# Patient Record
Sex: FEMALE | Race: WHITE | Employment: OTHER | URBAN - METROPOLITAN AREA
[De-identification: names, ages, dates, MRNs, and addresses within clinical notes are randomized per-mention and may not be internally consistent; named-entity substitution may affect disease eponyms.]

---

## 2017-01-19 ENCOUNTER — TRANSFERRED RECORDS (OUTPATIENT)
Dept: HEALTH INFORMATION MANAGEMENT | Facility: CLINIC | Age: 47
End: 2017-01-19

## 2017-01-19 ENCOUNTER — TELEPHONE (OUTPATIENT)
Dept: PSYCHIATRY | Facility: CLINIC | Age: 47
End: 2017-01-19

## 2017-01-19 DIAGNOSIS — Z79.899 ENCOUNTER FOR LONG-TERM (CURRENT) USE OF MEDICATIONS: ICD-10-CM

## 2017-01-19 DIAGNOSIS — F31.9 BIPOLAR DISORDER, UNSPECIFIED (H): Primary | ICD-10-CM

## 2017-01-19 NOTE — TELEPHONE ENCOUNTER
Per Dr. Rodriguez:  Please order a lithium level to [facility].    Faxed lab orders to [phone]

## 2017-01-20 ENCOUNTER — TELEPHONE (OUTPATIENT)
Dept: PSYCHIATRY | Facility: CLINIC | Age: 47
End: 2017-01-20

## 2017-01-20 DIAGNOSIS — Z79.899 ENCOUNTER FOR LONG-TERM (CURRENT) USE OF MEDICATIONS: ICD-10-CM

## 2017-01-20 DIAGNOSIS — F31.9 BIPOLAR DISORDER, UNSPECIFIED (H): ICD-10-CM

## 2017-01-20 LAB — LITHIUM SERPL-SCNC: 0.8 MMOL/L

## 2017-01-20 NOTE — TELEPHONE ENCOUNTER
Received Lithium Level Results from [facility], drawn on 01/19/2017. Results entered into EMR. Sent to Katheryn PAYAN and Dr. Braun for review. Original fax placed in scanning on 01/20/2017 and a copy was kept in psychiatry until scanning completed/confirmed. Ilda Brink LPN

## 2017-01-24 ENCOUNTER — HOSPITAL ENCOUNTER (INPATIENT)
Facility: CLINIC | Age: 47
LOS: 4 days | Discharge: HOME | DRG: 690 | End: 2017-01-29
Attending: EMERGENCY MEDICINE | Admitting: HOSPITALIST
Payer: COMMERCIAL

## 2017-01-24 ENCOUNTER — TELEPHONE (OUTPATIENT)
Dept: PSYCHIATRY | Facility: CLINIC | Age: 47
End: 2017-01-24

## 2017-01-24 DIAGNOSIS — N30.00 ACUTE CYSTITIS WITHOUT HEMATURIA: ICD-10-CM

## 2017-01-24 DIAGNOSIS — A04.72 C. DIFFICILE COLITIS: Primary | ICD-10-CM

## 2017-01-24 PROBLEM — N39.0 UTI (URINARY TRACT INFECTION): Status: ACTIVE | Noted: 2017-01-24

## 2017-01-24 LAB
ALBUMIN UR-MCNC: 30 MG/DL
ANION GAP SERPL CALCULATED.3IONS-SCNC: 8 MMOL/L (ref 3–14)
APPEARANCE UR: ABNORMAL
BACTERIA SPEC CULT: NORMAL
BASOPHILS # BLD AUTO: 0.1 10E9/L (ref 0–0.2)
BASOPHILS NFR BLD AUTO: 0.6 %
BILIRUB UR QL STRIP: NEGATIVE
BUN SERPL-MCNC: 8 MG/DL (ref 7–30)
CALCIUM SERPL-MCNC: 9.1 MG/DL (ref 8.5–10.1)
CHLORIDE SERPL-SCNC: 105 MMOL/L (ref 94–109)
CO2 SERPL-SCNC: 26 MMOL/L (ref 20–32)
COLOR UR AUTO: ABNORMAL
CREAT SERPL-MCNC: 0.96 MG/DL (ref 0.52–1.04)
DIFFERENTIAL METHOD BLD: ABNORMAL
EOSINOPHIL # BLD AUTO: 0.4 10E9/L (ref 0–0.7)
EOSINOPHIL NFR BLD AUTO: 3.2 %
ERYTHROCYTE [DISTWIDTH] IN BLOOD BY AUTOMATED COUNT: 14.9 % (ref 10–15)
GFR SERPL CREATININE-BSD FRML MDRD: 62 ML/MIN/1.7M2
GLUCOSE SERPL-MCNC: 80 MG/DL (ref 70–99)
GLUCOSE UR STRIP-MCNC: NEGATIVE MG/DL
HCT VFR BLD AUTO: 37 % (ref 35–47)
HGB BLD-MCNC: 11.8 G/DL (ref 11.7–15.7)
HGB UR QL STRIP: ABNORMAL
IMM GRANULOCYTES # BLD: 0 10E9/L (ref 0–0.4)
IMM GRANULOCYTES NFR BLD: 0.3 %
KETONES UR STRIP-MCNC: NEGATIVE MG/DL
LEUKOCYTE ESTERASE UR QL STRIP: ABNORMAL
LYMPHOCYTES # BLD AUTO: 2.3 10E9/L (ref 0.8–5.3)
LYMPHOCYTES NFR BLD AUTO: 20.5 %
MAGNESIUM SERPL-MCNC: 1.8 MG/DL (ref 1.6–2.3)
MCH RBC QN AUTO: 28.4 PG (ref 26.5–33)
MCHC RBC AUTO-ENTMCNC: 31.9 G/DL (ref 31.5–36.5)
MCV RBC AUTO: 89 FL (ref 78–100)
MICRO REPORT STATUS: NORMAL
MONOCYTES # BLD AUTO: 0.9 10E9/L (ref 0–1.3)
MONOCYTES NFR BLD AUTO: 7.8 %
NEUTROPHILS # BLD AUTO: 7.7 10E9/L (ref 1.6–8.3)
NEUTROPHILS NFR BLD AUTO: 67.6 %
NITRATE UR QL: NEGATIVE
NRBC # BLD AUTO: 0 10*3/UL
NRBC BLD AUTO-RTO: 0 /100
PH UR STRIP: 6 PH (ref 5–7)
PLATELET # BLD AUTO: 402 10E9/L (ref 150–450)
POTASSIUM SERPL-SCNC: 3.2 MMOL/L (ref 3.4–5.3)
POTASSIUM SERPL-SCNC: 4.1 MMOL/L (ref 3.4–5.3)
RBC # BLD AUTO: 4.15 10E12/L (ref 3.8–5.2)
RBC #/AREA URNS AUTO: 0 /HPF (ref 0–2)
SODIUM SERPL-SCNC: 139 MMOL/L (ref 133–144)
SP GR UR STRIP: 1.01 (ref 1–1.03)
SPECIMEN SOURCE: NORMAL
SQUAMOUS #/AREA URNS AUTO: 7 /HPF (ref 0–1)
URN SPEC COLLECT METH UR: ABNORMAL
UROBILINOGEN UR STRIP-MCNC: NORMAL MG/DL (ref 0–2)
WBC # BLD AUTO: 11.4 10E9/L (ref 4–11)
WBC #/AREA URNS AUTO: >182 /HPF (ref 0–2)
WBC CLUMPS #/AREA URNS HPF: PRESENT /HPF

## 2017-01-24 PROCEDURE — 25000125 ZZHC RX 250: Performed by: PHYSICIAN ASSISTANT

## 2017-01-24 PROCEDURE — 99285 EMERGENCY DEPT VISIT HI MDM: CPT | Mod: 25

## 2017-01-24 PROCEDURE — 83735 ASSAY OF MAGNESIUM: CPT | Performed by: EMERGENCY MEDICINE

## 2017-01-24 PROCEDURE — 80048 BASIC METABOLIC PNL TOTAL CA: CPT | Performed by: EMERGENCY MEDICINE

## 2017-01-24 PROCEDURE — 84132 ASSAY OF SERUM POTASSIUM: CPT | Performed by: PHYSICIAN ASSISTANT

## 2017-01-24 PROCEDURE — G0378 HOSPITAL OBSERVATION PER HR: HCPCS

## 2017-01-24 PROCEDURE — 87186 SC STD MICRODIL/AGAR DIL: CPT | Performed by: EMERGENCY MEDICINE

## 2017-01-24 PROCEDURE — 85025 COMPLETE CBC W/AUTO DIFF WBC: CPT | Performed by: EMERGENCY MEDICINE

## 2017-01-24 PROCEDURE — 81001 URINALYSIS AUTO W/SCOPE: CPT | Performed by: EMERGENCY MEDICINE

## 2017-01-24 PROCEDURE — 96365 THER/PROPH/DIAG IV INF INIT: CPT

## 2017-01-24 PROCEDURE — 25000132 ZZH RX MED GY IP 250 OP 250 PS 637: Performed by: PHYSICIAN ASSISTANT

## 2017-01-24 PROCEDURE — 25000128 H RX IP 250 OP 636: Performed by: EMERGENCY MEDICINE

## 2017-01-24 PROCEDURE — 87088 URINE BACTERIA CULTURE: CPT | Performed by: EMERGENCY MEDICINE

## 2017-01-24 PROCEDURE — 25000132 ZZH RX MED GY IP 250 OP 250 PS 637: Performed by: EMERGENCY MEDICINE

## 2017-01-24 PROCEDURE — 25000128 H RX IP 250 OP 636

## 2017-01-24 PROCEDURE — 96361 HYDRATE IV INFUSION ADD-ON: CPT

## 2017-01-24 PROCEDURE — 87086 URINE CULTURE/COLONY COUNT: CPT | Performed by: EMERGENCY MEDICINE

## 2017-01-24 PROCEDURE — 99220 ZZC INITIAL OBSERVATION CARE,LEVL III: CPT | Performed by: PHYSICIAN ASSISTANT

## 2017-01-24 PROCEDURE — 25000125 ZZHC RX 250

## 2017-01-24 PROCEDURE — 36415 COLL VENOUS BLD VENIPUNCTURE: CPT | Performed by: PHYSICIAN ASSISTANT

## 2017-01-24 PROCEDURE — 96375 TX/PRO/DX INJ NEW DRUG ADDON: CPT

## 2017-01-24 PROCEDURE — 25000125 ZZHC RX 250: Performed by: EMERGENCY MEDICINE

## 2017-01-24 PROCEDURE — 25000128 H RX IP 250 OP 636: Performed by: PHYSICIAN ASSISTANT

## 2017-01-24 RX ORDER — IBUPROFEN 400 MG/1
400-600 TABLET, FILM COATED ORAL EVERY 6 HOURS PRN
Status: DISCONTINUED | OUTPATIENT
Start: 2017-01-24 | End: 2017-01-28

## 2017-01-24 RX ORDER — DESOGESTREL AND ETHINYL ESTRADIOL 0.15-0.03
1 KIT ORAL DAILY
Status: ON HOLD | COMMUNITY
End: 2018-01-04

## 2017-01-24 RX ORDER — CLONAZEPAM 1 MG/1
2 TABLET ORAL AT BEDTIME
Status: DISCONTINUED | OUTPATIENT
Start: 2017-01-24 | End: 2017-01-29 | Stop reason: HOSPADM

## 2017-01-24 RX ORDER — MORPHINE SULFATE 4 MG/ML
4 INJECTION, SOLUTION INTRAMUSCULAR; INTRAVENOUS ONCE
Status: COMPLETED | OUTPATIENT
Start: 2017-01-24 | End: 2017-01-24

## 2017-01-24 RX ORDER — POTASSIUM CHLORIDE 7.45 MG/ML
10 INJECTION INTRAVENOUS
Status: DISCONTINUED | OUTPATIENT
Start: 2017-01-24 | End: 2017-01-29 | Stop reason: HOSPADM

## 2017-01-24 RX ORDER — ONDANSETRON 2 MG/ML
4 INJECTION INTRAMUSCULAR; INTRAVENOUS EVERY 6 HOURS PRN
Status: DISCONTINUED | OUTPATIENT
Start: 2017-01-24 | End: 2017-01-26

## 2017-01-24 RX ORDER — LEVOTHYROXINE SODIUM 50 UG/1
50 TABLET ORAL DAILY
Status: DISCONTINUED | OUTPATIENT
Start: 2017-01-24 | End: 2017-01-29 | Stop reason: HOSPADM

## 2017-01-24 RX ORDER — MAGNESIUM SULFATE HEPTAHYDRATE 40 MG/ML
4 INJECTION, SOLUTION INTRAVENOUS EVERY 4 HOURS PRN
Status: DISCONTINUED | OUTPATIENT
Start: 2017-01-24 | End: 2017-01-29 | Stop reason: HOSPADM

## 2017-01-24 RX ORDER — LIDOCAINE 40 MG/G
CREAM TOPICAL
Status: DISCONTINUED | OUTPATIENT
Start: 2017-01-24 | End: 2017-01-29 | Stop reason: HOSPADM

## 2017-01-24 RX ORDER — MIRTAZAPINE 7.5 MG/1
7.5 TABLET, FILM COATED ORAL AT BEDTIME
Status: DISCONTINUED | OUTPATIENT
Start: 2017-01-24 | End: 2017-01-29 | Stop reason: HOSPADM

## 2017-01-24 RX ORDER — PHENAZOPYRIDINE HYDROCHLORIDE 100 MG/1
200 TABLET, FILM COATED ORAL ONCE
Status: COMPLETED | OUTPATIENT
Start: 2017-01-24 | End: 2017-01-24

## 2017-01-24 RX ORDER — KETOROLAC TROMETHAMINE 30 MG/ML
30 INJECTION, SOLUTION INTRAMUSCULAR; INTRAVENOUS ONCE
Status: COMPLETED | OUTPATIENT
Start: 2017-01-24 | End: 2017-01-24

## 2017-01-24 RX ORDER — PANTOPRAZOLE SODIUM 40 MG/1
40 TABLET, DELAYED RELEASE ORAL
Status: DISCONTINUED | OUTPATIENT
Start: 2017-01-25 | End: 2017-01-29 | Stop reason: HOSPADM

## 2017-01-24 RX ORDER — POTASSIUM CHLORIDE 1500 MG/1
20-40 TABLET, EXTENDED RELEASE ORAL
Status: DISCONTINUED | OUTPATIENT
Start: 2017-01-24 | End: 2017-01-29 | Stop reason: HOSPADM

## 2017-01-24 RX ORDER — LISINOPRIL 10 MG/1
10 TABLET ORAL DAILY
Status: DISCONTINUED | OUTPATIENT
Start: 2017-01-24 | End: 2017-01-29 | Stop reason: HOSPADM

## 2017-01-24 RX ORDER — AMITRIPTYLINE HYDROCHLORIDE 10 MG/1
10 TABLET ORAL AT BEDTIME
Status: DISCONTINUED | OUTPATIENT
Start: 2017-01-24 | End: 2017-01-29 | Stop reason: HOSPADM

## 2017-01-24 RX ORDER — POTASSIUM CHLORIDE 1.5 G/1.58G
20-40 POWDER, FOR SOLUTION ORAL
Status: DISCONTINUED | OUTPATIENT
Start: 2017-01-24 | End: 2017-01-29 | Stop reason: HOSPADM

## 2017-01-24 RX ORDER — LABETALOL HYDROCHLORIDE 5 MG/ML
10 INJECTION, SOLUTION INTRAVENOUS
Status: DISCONTINUED | OUTPATIENT
Start: 2017-01-24 | End: 2017-01-26

## 2017-01-24 RX ORDER — POTASSIUM CHLORIDE 29.8 MG/ML
20 INJECTION INTRAVENOUS
Status: DISCONTINUED | OUTPATIENT
Start: 2017-01-24 | End: 2017-01-29 | Stop reason: HOSPADM

## 2017-01-24 RX ORDER — CLINDAMYCIN PHOSPHATE 10 UG/ML
LOTION TOPICAL EVERY MORNING
Status: ON HOLD | COMMUNITY
End: 2018-01-04

## 2017-01-24 RX ORDER — LITHIUM CARBONATE 450 MG
450 TABLET, EXTENDED RELEASE ORAL DAILY
Status: DISCONTINUED | OUTPATIENT
Start: 2017-01-24 | End: 2017-01-29 | Stop reason: HOSPADM

## 2017-01-24 RX ORDER — DESOGESTREL AND ETHINYL ESTRADIOL 0.15-0.03
1 KIT ORAL DAILY
Status: DISCONTINUED | OUTPATIENT
Start: 2017-01-24 | End: 2017-01-29 | Stop reason: HOSPADM

## 2017-01-24 RX ORDER — NALOXONE HYDROCHLORIDE 0.4 MG/ML
.1-.4 INJECTION, SOLUTION INTRAMUSCULAR; INTRAVENOUS; SUBCUTANEOUS
Status: DISCONTINUED | OUTPATIENT
Start: 2017-01-24 | End: 2017-01-29 | Stop reason: HOSPADM

## 2017-01-24 RX ORDER — ACETAMINOPHEN 325 MG/1
650 TABLET ORAL EVERY 4 HOURS PRN
Status: DISCONTINUED | OUTPATIENT
Start: 2017-01-24 | End: 2017-01-29 | Stop reason: HOSPADM

## 2017-01-24 RX ORDER — SODIUM CHLORIDE 9 MG/ML
1000 INJECTION, SOLUTION INTRAVENOUS CONTINUOUS
Status: DISCONTINUED | OUTPATIENT
Start: 2017-01-24 | End: 2017-01-24

## 2017-01-24 RX ORDER — OXYCODONE HYDROCHLORIDE 5 MG/1
5-10 TABLET ORAL
Status: DISCONTINUED | OUTPATIENT
Start: 2017-01-24 | End: 2017-01-27

## 2017-01-24 RX ORDER — TRIAMCINOLONE ACETONIDE 1 MG/G
CREAM TOPICAL 2 TIMES DAILY PRN
Status: ON HOLD | COMMUNITY
End: 2018-01-04

## 2017-01-24 RX ORDER — SODIUM CHLORIDE 9 MG/ML
INJECTION, SOLUTION INTRAVENOUS CONTINUOUS
Status: DISCONTINUED | OUTPATIENT
Start: 2017-01-24 | End: 2017-01-25

## 2017-01-24 RX ORDER — CLONAZEPAM 1 MG/1
1 TABLET ORAL EVERY MORNING
Status: DISCONTINUED | OUTPATIENT
Start: 2017-01-24 | End: 2017-01-25

## 2017-01-24 RX ORDER — OXYCODONE HYDROCHLORIDE 5 MG/1
5 TABLET ORAL
Status: DISCONTINUED | OUTPATIENT
Start: 2017-01-24 | End: 2017-01-24

## 2017-01-24 RX ORDER — POTASSIUM CHLORIDE 750 MG/1
40 TABLET, EXTENDED RELEASE ORAL ONCE
Status: COMPLETED | OUTPATIENT
Start: 2017-01-24 | End: 2017-01-24

## 2017-01-24 RX ORDER — ONDANSETRON 4 MG/1
4 TABLET, ORALLY DISINTEGRATING ORAL EVERY 6 HOURS PRN
Status: DISCONTINUED | OUTPATIENT
Start: 2017-01-24 | End: 2017-01-26

## 2017-01-24 RX ADMIN — LEVOTHYROXINE SODIUM 50 MCG: 50 TABLET ORAL at 11:54

## 2017-01-24 RX ADMIN — SODIUM CHLORIDE 1000 ML: 9 INJECTION, SOLUTION INTRAVENOUS at 06:47

## 2017-01-24 RX ADMIN — POTASSIUM CHLORIDE 20 MEQ: 1500 TABLET, EXTENDED RELEASE ORAL at 11:54

## 2017-01-24 RX ADMIN — OXYCODONE HYDROCHLORIDE 10 MG: 5 TABLET ORAL at 20:16

## 2017-01-24 RX ADMIN — GENTAMICIN SULFATE 80 MG: 40 INJECTION, SOLUTION INTRAMUSCULAR; INTRAVENOUS at 19:35

## 2017-01-24 RX ADMIN — MORPHINE SULFATE 4 MG: 4 INJECTION, SOLUTION INTRAMUSCULAR; INTRAVENOUS at 08:16

## 2017-01-24 RX ADMIN — POTASSIUM CHLORIDE 40 MEQ: 750 TABLET, FILM COATED, EXTENDED RELEASE ORAL at 08:39

## 2017-01-24 RX ADMIN — SODIUM CHLORIDE: 9 INJECTION, SOLUTION INTRAVENOUS at 19:09

## 2017-01-24 RX ADMIN — ACETAMINOPHEN 650 MG: 325 TABLET, FILM COATED ORAL at 20:16

## 2017-01-24 RX ADMIN — CLONAZEPAM 1 MG: 1 TABLET ORAL at 10:32

## 2017-01-24 RX ADMIN — OXYCODONE HYDROCHLORIDE 10 MG: 5 TABLET ORAL at 23:12

## 2017-01-24 RX ADMIN — MIRTAZAPINE 7.5 MG: 7.5 TABLET, FILM COATED ORAL at 21:32

## 2017-01-24 RX ADMIN — OXYCODONE HYDROCHLORIDE 10 MG: 5 TABLET ORAL at 16:43

## 2017-01-24 RX ADMIN — KETOROLAC TROMETHAMINE 30 MG: 30 INJECTION, SOLUTION INTRAMUSCULAR at 06:49

## 2017-01-24 RX ADMIN — ACETAMINOPHEN 650 MG: 325 TABLET, FILM COATED ORAL at 16:42

## 2017-01-24 RX ADMIN — PHENAZOPYRIDINE HYDROCHLORIDE 200 MG: 100 TABLET ORAL at 06:53

## 2017-01-24 RX ADMIN — OXYCODONE HYDROCHLORIDE 5 MG: 5 TABLET ORAL at 13:55

## 2017-01-24 RX ADMIN — AMITRIPTYLINE HYDROCHLORIDE 10 MG: 10 TABLET, FILM COATED ORAL at 21:32

## 2017-01-24 RX ADMIN — CLONAZEPAM 2 MG: 1 TABLET ORAL at 21:32

## 2017-01-24 RX ADMIN — GENTAMICIN SULFATE 100 MG: 40 INJECTION, SOLUTION INTRAMUSCULAR; INTRAVENOUS at 08:40

## 2017-01-24 RX ADMIN — LISINOPRIL 10 MG: 10 TABLET ORAL at 10:38

## 2017-01-24 RX ADMIN — LITHIUM CARBONATE 450 MG: 450 TABLET ORAL at 11:54

## 2017-01-24 RX ADMIN — OXYCODONE HYDROCHLORIDE 5 MG: 5 TABLET ORAL at 10:32

## 2017-01-24 RX ADMIN — LABETALOL HYDROCHLORIDE 10 MG: 5 INJECTION, SOLUTION INTRAVENOUS at 19:35

## 2017-01-24 RX ADMIN — IBUPROFEN 400 MG: 400 TABLET ORAL at 19:09

## 2017-01-24 ASSESSMENT — ENCOUNTER SYMPTOMS
FEVER: 0
DIARRHEA: 0
COUGH: 0
FREQUENCY: 1
HEMATURIA: 0
FLANK PAIN: 1
ABDOMINAL PAIN: 1
NAUSEA: 0
DYSURIA: 1
BLOOD IN STOOL: 0
VOMITING: 0

## 2017-01-24 ASSESSMENT — PAIN DESCRIPTION - DESCRIPTORS
DESCRIPTORS: SHARP

## 2017-01-24 NOTE — ED NOTES
Reports urinary burning, frequency, urgency, foul urine x 5 days, no fever; states that because of allergies she needs gentamycin IV or IM for UTI treatment.  ABCs intact.

## 2017-01-24 NOTE — ED NOTES
Bed: 0205-01  Expected date: 1/24/17  Expected time:   Means of arrival:   Comments:  Ed admit-juan

## 2017-01-24 NOTE — H&P
[facility]  Observation Unit  H & P      Patient Name: Ana Laura Wharton MRN# [id number]   Age: 46 year old YOB: 1970     Date of Admission:1/24/2017    Primary care provider: Robert Meza MD  Date of Service: 1/24/2017         Assessment and Plan:   Ana Laura Wharton is a 46 year old female with  a history of Depression, Anxiety, Bipolar Disorder, GERD, Hypothyroidism, HTN, Spontaneous PTX, Interstitial Cystitis, Recurrent Cdiff colitis who presents to the ED today 2/2 UTI symptoms    UTI, possible Pyelonephritis - hx of interstitial cystitis with recurrent UTI complicated by cdiff infections in the past.  Now with 5 days of symptoms with +UA.  Afebrile with WBC 11.4.  S/p IV Gentamycin in the ED.    - will obtain ID consult 2/2 hx of recurrent UTI in the setting of recurrent Cdiff infections.  - IVF hydration  - pharmacy consulted for Gentamicin dosing.   - follow up urine culture  - was see by Urology last spring; recommend patient follow up at discharge.    Hx Cdiff - first episode of cdiff 4/2016 which was initially treated with Flagyl which she was unable to tolerate.  She failed multiple courses of Vancomycin and underwent a fecal transplant 8/8/2016 via colonoscopy at [facility].  Last cdiff PCR 10/2016 negative.  Currently with one loose stool per day.  - monitor and check cdiff PCR       Hypokalemia - 3.2, likely 2/2 recent emesis/GI losses.  - replace per electrolyte protocol and check Magnesium    Depression/Anxiety/Bipolar Disorder/Hx Suicide Attempt - reports moods stable.  Anxious and tearful on exam today.  - continue home Clonazepam, Lithium, Amitriptyline, Tizanidine. Mirtazapine     Hypothyroidism - continue home Levothyroxine     HTN - stable.  Continue home Lisinopril     GERD - stable.  Continue home Protonix     CODE: Full  Diet/IVF: regular, NS  GI ppx:  protonix  DVT ppx: SCD    Iliana Raphael MS PA-C  Physician Assistant   Hospitalist Service  Pager:  "[phone]           Chief Complaint:   \"I have a bladder infection\"         HPI:   46 year old female with a history of Depression, Anxiety, Bipolar Disorder, GERD, Hypothyroidism, HTN, Spontaneous PTX, Interstitial Cystitis, Recurrent Cdiff colitis who presents to the ED today 2/2 UTI symptoms    Patient states she has had a 5 day history of suprapubic pain with radiation to the bilateral lower back with hematuria, foul smelling urine and dysuria.  She had a few episodes of emesis last week.  She denies fevers or chills.  She has baseline loose stools of one per day.  Patient denies chest pain, palpitations, upper respiratory symptoms of rhinorrhea or congestion, shortness of breath, cough.      Patient has a history of UTI treated with Ciprofloxacin 4/2016 with treatment complicated by recurrent cdiff which ultimately required a fecal transplant 8/8/2016 through [facility].  She had recurrent UTI which was treated with Fosfomycin with recurrent c.diff colitis.  She continues with one loose stool per day.  Last cdiff culture 10/2016 was negative.  Patient was last seen by GI 9/2016 who recommended treatment with Gentamicin for treatment of future UTIs.    ED work up revealed patient /93,  and afebrile.  Laboratory work up revealed potassium 3.2 and wbc 11.4 with otherwise normal bmp and cbc with a positive UA.  Patient admitted for further management of UTI.         Past Medical History:     Past Medical History   Diagnosis Date     Depressive disorder      Anxiety      Bipolar disorder (H)      Gastro-oesophageal reflux disease      Migraine      Spontaneous pneumothorax      x3, resolved w/ pleurodesis      Hypothyroidism 4/1/2015     Essential hypertension 7/8/2015     C. difficile colitis           Past Surgical History:     Past Surgical History   Procedure Laterality Date     Thoracic surgery       for pneumothorax, pleurodesis and lobe resection     Orthopedic surgery       L shoulder     " "Arthroscopy knee       L knee     Cervix surgery       for pre-cancerous changes     Left knee surgery            Social History:     Social History     Social History     Marital Status:      Spouse Name: N/A     Number of Children: N/A     Years of Education: N/A     Occupational History     Not on file.     Social History Main Topics     Smoking status: Former Smoker     Types: Cigarettes     Quit date: 01/01/1997     Smokeless tobacco: Never Used     Alcohol Use: No     Drug Use: No     Sexual Activity:     Partners: Male     Birth Control/ Protection: OCP     Other Topics Concern     Not on file     Social History Narrative          Family History:     Family History   Problem Relation Age of Onset     Depression Mother      Lipids Father      hyperlipidemia     Macular Degeneration Father           Allergies:      Allergies   Allergen Reactions     Amoxicillin-Pot Clavulanate      PN: LW Reaction: Itching, Pruritis     Azithromycin      Other reaction(s): Dermatitis     Cephalexin      PN: LW Reaction: Itching, Pruritis     Ciprofloxacin Other (See Comments)     Joint pain cdiff     Compazine [Prochlorperazine] Other (See Comments)     dystonia     Metoclopramide Other (See Comments)     \"I feel like I am crawling out of my skin\"     Minocycline Nausea and Vomiting     PN: DIARRHEA, VOMITING, AND STOMACH CRAMPS     Penicillins Itching     Reglan [Metoclopramide Hcl] Other (See Comments)     Sensation of \"crawling out of skin\"     Restoril [Temazepam]      Thorazine [Chlorpromazine] Other (See Comments)     dystonia     Abilify [Aripiprazole] Rash     Lamotrigine Rash     Severe drug rash - contraindication to receiving again. Skin peeling.      Sulfa Drugs Rash          Medications:     Prior to Admission medications    Medication Sig Last Dose Taking? Auth Provider   LISINOPRIL PO Take 10 mg by mouth daily  Yes Reported, Patient   clonazePAM (KLONOPIN) 1 MG tablet Take 1 tablet (1 mg) by mouth 3 times " daily  Yes Jaden Rodriguez MD   mirtazapine (REMERON) 15 MG tablet Take 1 tablet (15 mg) by mouth At Bedtime  Yes Jaden Rodriguez MD   lithium (ESKALITH) 450 MG CR tablet Take 1 tablet (450 mg) by mouth daily  Yes Jaden Rodriguez MD   amitriptyline (ELAVIL) 10 MG tablet Take 2 tabs (20 mg) po daily.  Yes Jaden Rodriguez MD   hydrOXYzine (ATARAX) 50 MG tablet Take 3 tabs twice daily as needed  Yes Jaden Rodriguez MD   Pantoprazole Sodium (PROTONIX PO) Take 40 mg by mouth  Yes Reported, Patient   ibuprofen 200 MG capsule Take 200 mg by mouth  Yes Reported, Patient   levothyroxine (SYNTHROID, LEVOTHROID) 75 MCG tablet Take 50 mcg by mouth  Yes Reported, Patient   norethindrone-ethinyl estradiol (NECON) 0.5-35 MG-MCG per tablet   Yes Reported, Patient   eletriptan (RELPAX) 40 MG tablet Take 40 mg by mouth  Yes Reported, Patient   pantoprazole sodium (PROTONIX) 40 MG tablet   Yes Reported, Patient   vancomycin (VANCOCIN) 125 MG capsule Take 1 capsule (125 mg) by mouth daily  Yes Daniel Dennis MD   norethindrone-ethinyl estradiol (ORTHO-NOVUM 7/7/7) 0.5/0.75/1-35 MG-MCG per tablet Take 1 tablet by mouth daily  Yes Jaden Rodriguez MD   DiphenhydrAMINE HCl (BENADRYL PO) Take 50 mg by mouth as needed For seasonal allergies  Yes Reported, Patient   eletriptan (RELPAX) 40 MG tablet Take 1 tablet (40 mg) by mouth at onset of headache for migraine  Yes Davonte Langston MD   TRAMADOL HCL PO Take 50 mg by mouth every 6 hours as needed  Yes Reported, Patient   Ketorolac Tromethamine (TORADOL IM) Inject 30 mg into the muscle as needed Per pt for migraines  Yes Reported, Patient   ORDER FOR DME Equipment being ordered: 10,000 lux SAD light box.  Use for 30 minutes in the morning before the sun is fully up.      **Note to Home Medical Equipment staff - call patient if this is approved and she can pick it up OR call/fax me if additional information needs to be submitted to insurance. Phone [phone].  Fax  "[phone].  Thank You, May BEARD  Yes Jaden Rodriguez MD   Acetaminophen (TYLENOL 8 HOUR PO) Take 1,000 mg by mouth every 6 hours as needed (pain)    Reported, Patient          Review of Systems:   A complete ROS was performed and is negative other than what is stated in the HPI.       Physical Exam:   Blood pressure 143/93, pulse 105, temperature 97.3  F (36.3  C), temperature source Temporal, resp. rate 20, height 1.676 m (5' 6\"), weight 54.432 kg (120 lb), last menstrual period 01/22/2017, SpO2 100 %.  General: Alert, interactive, mild distress, tearful and anxious appearing.  HEENT: AT/NC, sclera anicteric, PERRL, EOMI  Chest/Resp: clear to auscultation bilaterally, no crackles or wheezes  Heart/CV: regular rate and rhythm, no murmur  Abdomen/GI: Soft, tender to moderated palpation over the suprapubic area with bilateral CVA tenderness. nondistended. +BS.    Extremities/MSK: No LE edema  Skin: Warm and dry, no jaundice or rash  Neuro: Alert & oriented x 3, Cns 2-12 intact, moves all extremities equally         Labs:   ROUTINE ICU LABS (Last four results)  CMP  Recent Labs  Lab 01/24/17  0644      POTASSIUM 3.2*   CHLORIDE 105   CO2 26   ANIONGAP 8   GLC 80   BUN 8   CR 0.96   GFRESTIMATED 62   GFRESTBLACK 76   ISAURO 9.1     CBC  Recent Labs  Lab 01/24/17  0644   WBC 11.4*   RBC 4.15   HGB 11.8   HCT 37.0   MCV 89   MCH 28.4   MCHC 31.9   RDW 14.9             Imaging/Procedures:   No results found for this or any previous visit.          "

## 2017-01-24 NOTE — ED NOTES
OBSERVATION patient IN TIME: 0934  ROOM #205    Living Situation (if not independent, order SW consult):ind  Facility name:    Activity level at baseline:ind  Activity level on admit:ind    Is patient a falls risk? No  Falls armband on? No  Within Arm's Reach? No.Reason not within arm's reach is: ind  Bed alarm turned on?   No  Personal alarm in place and turned on?   No    Patient registered to observation; given Patient Bill of Rights; given the opportunity to ask questions about observation status and their plan of care.  Patient has been oriented to the observation room, bathroom, and call light is in place.    :

## 2017-01-24 NOTE — PROGRESS NOTES
ID consult dictated IM P 1 45 yo female UTI, relapsing cdiff    REC await current dats, gent alone for now

## 2017-01-24 NOTE — ED NOTES
In not flushing catheter kinked no infiltration or phlebitis noted IV d/c hot pack applied to restart IV.

## 2017-01-24 NOTE — CONSULTS
INFECTIOUS DISEASE CONSULTATION       REFERRING PHYSICIAN:  Iliana Raphael PA-C.      PRIMARY CARE PHYSICIAN:  Dr. Robert Meza at [facility].       GASTROENTEROLOGIST:  Dr. Juan Luis Dennis at [facility].       IMPRESSION:   1.  A 46-year-old female with complicated history admitted with acute urinary tract infection, increasing urinary symptoms, some mild systemic symptoms, not clear pyelonephritis, current cultures pending.   2.  History of recurrent urinary tract infections, particularly in the last several months.   3.  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   4.  Some chronic diarrhea, question irritable bowel disease component.   5.  History of interstitial cystitis, but definite urinary tract infections on top of that.   6.  History of amoxicillin, cephalosporins, azithromycin, Cipro, minocycline, sulfa allergies.   7.  With the relapsing Clostridium difficile, history of chronic vancomycin use, which was stopped due to headaches, not logically a side effect of vancomycin orally, which is not absorbed.   8.  History of depression and bipolar disorder.      RECOMMENDATIONS:   1.  Continue gentamicin for now; note Dr. Dennis's plan to use gentamicin, perception that possibly the relapse after stool transplant was due to a course of fosfomycin.  The usual thought would be that fosfomycin has no activity against gastrointestinal tract anaerobic bacteria and minimal activity in general and should be among the best antimicrobial agents to use for patients with C. diff, gentamicin would be also without activity against anaerobes, but not likely to be superior at preventing C. diff.   2.  Would normally do vancomycin  orally as a preventive given her long history, but we will hold off for now, get a stool C. diff, obviously if positive again will have to retreat again at this point.   3.  How much antibiotics to do currently is unclear, it is not clear this is pyelonephritis, mostly lower urinary tract symptoms, obviously change antibiotics if the current cultures direct.      HISTORY:  Ana Laura Wharton is a 46-year-old female is seen in consultation due to a urinary tract infection.  The patient has a complicated history, particular in the last several months.  She has had a history of interstitial cystitis historically and some history of recurrent UTIs.  The UTI problem has been particularly problematic this past year, it started in April at which time she had a UTI and was treated initially with Macrodantin, it proved to be resistant to that and then was given Cipro.  She at that point developed severe diarrhea and had C. diff colitis.  She then during the course of the C. diff, was on extended treatment, mostly with vancomycin, had some ongoing diarrhea and slow to clear disease, but eventually improvement on the vancomycin, only to relapse subsequently.  She had courses of antibiotics during that time due to UTIs, which she attributes partly to stools soiling from loose stools.  The UTIs were treated with a variety of agents and generally would resolve with treatment.  There is some difficult interpreting her urinary tract symptoms as she has interstitial cystitis, but in looking at the [facility] records, they were definite abnormal urines and positive urine cultures.      She in August underwent a stool transplant due to the relapsing C. diff at [facility].  The stool transplant was initially successful, but very soon thereafter developed a UTI, was treated with fosfomycin, which should be an agent with little likelihood of adversely affecting bowel mina and promptly had worse diarrhea.  C. diff was positive on 2  rechecks.  She then sought a second opinion on her own at the [place] from Dr. Dennis; he actually treated her with a course of fidaxomicin, the first she had gotten that agent, had improvement in her diarrhea and her C. diff was negative as of October.  They have been following some degree of loose stools, but not obvious this has been C. diff and her stools are fairly stable currently, although is again having somewhat more loose stools at present.  She then started having new urinary tract symptoms the last several days, tried to hold off due to concern about antibiotics, but now admitted with uncontrollable cystitis type symptoms and mild systemic symptoms.  Her current urine is grossly abnormal with greater than 182 white cells, she is also having loose stools, C. diff is pending, urine culture is pending.      PAST MEDICAL HISTORY:  History of recurrent UTIs in the past and more so recently, history of relapsing C. diff as a problem last year, history of depression, anxiety and bipolar disorder.  Other medical diagnoses as listed including hypothyroidism, gastroesophageal reflux disease, hypertension, prior spontaneous pneumothorax.      ALLERGIES:  Numerous listed allergies, some were drug intolerances more than allergies but probable true allergies again sulfa, cephalosporins and penicillins, more GI type problems with azithromycin and Cipro, Minocin also GI side effects primarily, vancomycin has caused headaches but not true allergy.      SOCIAL AND FAMILY HISTORY:  Extensive health care contact of course through this, but no resistant pathogens besides the C. diff.  No significant alcohol or tobacco use.      MEDICATIONS:  Meds as listed.      REVIEW OF SYSTEMS:  Feels lousy in a general sense, but without major fevers or chills, some degree of slight back discomfort, some general sensation of not feeling well.      PHYSICAL EXAMINATION:   GENERAL:  The patient appears her stated age.  She looks  somewhat depressed and mildly ill.   VITAL SIGNS:  Currently are normal though including afebrile.   HEENT:  No thrush or oropharyngeal lesions.  Pupils reactive.   NECK:  Supple and nontender.   HEART:  Regular rhythm.   LUNGS:  Clear bilaterally.   ABDOMEN:  Definite tenderness, mainly suprapubic, although to a lesser extent across the lower abdomen.   EXTREMITIES:  No rash or skin lesions.      LABORATORY:  Urine grossly abnormal as above.  Urine culture pending.  C. diff pending.      Thank you very much for this consultation.  We will follow this patient with you.            CHARLEY MARIE MD             D: 2017 14:28   T: 2017 15:23   MT: KEVIN#[id number]      Name:     KEE TINEO   MRN:      [id number]        Account:       [id number]   :      1970           Consult Date:  2017      Document: [id number]       cc: Daniel Meza MD

## 2017-01-24 NOTE — ED NOTES
Observation Brochure and Video   Patient informed of observation status based on provider's order. Observation Brochure was given and video watched.  Otilia Paulino RN

## 2017-01-24 NOTE — PHARMACY-ADMISSION MEDICATION HISTORY
Admission medication history interview status for this patient is complete. See [facility] admission navigator for allergy information, prior to admission medications and immunization status.     Medication history interview source(s):Patient  Medication history resources (including written lists, pill bottles, clinic record):None  Primary pharmacy:[facility]    Changes made to PTA medication list:  Added: triamcinolone, tizanidine, zofran, apri, clindamycin,   Deleted: vancomycin, tramadol, diphenhydramine  Changed: amitriptyline, clonazepam, relpax, hydroxyzine, ibuprofen, mirtazapine    Actions taken by pharmacist (provider contacted, etc):None     Additional medication history information:None    Medication reconciliation/reorder completed by provider prior to medication history? No    For patients on insulin therapy: No (Yes/No)  Lantus/levemir/NPH/Mix 70/30 dose:  _____   in AM/PM  or twice daily   Sliding scale Novolog Y/N  If Yes, do you have a baseline novolog pre-meal dose:  ______units with meals   Patients eat three meals a day:   Y/N     Any Barriers to therapy:  cost of medications/comfortable with giving injections (if applicable)/ comfortable and confident with current diabetes regimen       Prior to Admission medications    Medication Sig Last Dose Taking? Auth Provider   LISINOPRIL PO Take 10 mg by mouth daily 1/23/2017 at Unknown time Yes Reported, Patient   AMITRIPTYLINE HCL PO Take 10 mg by mouth At Bedtime 1/23/2017 at Unknown time Yes Unknown, Entered By History   CLONAZEPAM PO Take 1 mg by mouth every morning 1/23/2017 at Unknown time Yes Unknown, Entered By History   CLONAZEPAM PO Take 2 mg by mouth At Bedtime 1/23/2017 at Unknown time Yes Unknown, Entered By History   Eletriptan Hydrobromide (RELPAX PO) Take 20 mg by mouth at onset of headache for migraine (May repeat in 2 hours if needed)  Yes Unknown, Entered By History   HYDROXYZINE HCL PO Take 100 mg by mouth daily as needed   Yes Unknown, Entered By History   IBUPROFEN PO Take 400-600 mg by mouth every 6 hours as needed for moderate pain  Yes Unknown, Entered By History   Levothyroxine Sodium (SYNTHROID PO) Take 50 mcg by mouth daily 1/23/2017 at Unknown time Yes Unknown, Entered By History   MIRTAZAPINE PO Take 7.5 mg by mouth At Bedtime 1/23/2017 at Unknown time Yes Unknown, Entered By History   desogestrel-ethinyl estradiol (APRI) 0.15-30 MG-MCG per tablet Take 1 tablet by mouth daily 1/23/2017 at Unknown time Yes Unknown, Entered By History   triamcinolone (KENALOG) 0.1 % cream Apply topically 2 times daily as needed for irritation  Yes Unknown, Entered By History   Ondansetron (ZOFRAN ODT PO) Take 4 mg by mouth every 8 hours as needed for nausea  Yes Unknown, Entered By History   clindamycin (CLEOCIN T) 1 % lotion Apply topically every morning 1/23/2017 at Unknown time Yes Unknown, Entered By History   TIZANIDINE HCL PO Take 4 mg by mouth At Bedtime 1/23/2017 at Unknown time Yes Unknown, Entered By History   lithium (ESKALITH) 450 MG CR tablet Take 1 tablet (450 mg) by mouth daily 1/23/2017 at Unknown time Yes Jaden Rodriguez MD   Pantoprazole Sodium (PROTONIX PO) Take 40 mg by mouth every morning (before breakfast)  1/23/2017 at Unknown time Yes Reported, Patient   Ketorolac Tromethamine (TORADOL IM) Inject 30 mg into the muscle daily as needed Per pt for migraines  Yes Reported, Patient   Acetaminophen (TYLENOL 8 HOUR PO) Take 1,000 mg by mouth every 6 hours as needed (pain)   Yes Reported, Patient

## 2017-01-24 NOTE — IP AVS SNAPSHOT
MRN:[id number]                      After Visit Summary   1/24/2017    Ana Laura Wharton    MRN: [id number]           Thank you!     Thank you for choosing [facility] for your care. Our goal is always to provide you with excellent care. Hearing back from our patients is one way we can continue to improve our services. Please take a few minutes to complete the written survey that you may receive in the mail after you visit. If you would like to speak to someone directly about your visit please contact Patient Relations at [phone]. Thank you!          Patient Information     Date Of Birth          1970        About your hospital stay     You were admitted on:  January 24, 2017 You last received care in the:  [address] [facility]    You were discharged on:  January 29, 2017        Reason for your hospital stay       Acute cystitis w/ hematuria                  Who to Call     For medical emergencies, please call 911.  For non-urgent questions about your medical care, please call your primary care provider or clinic, [phone]          Attending Provider     Provider    Juan Stark, Raffi Ferrari MD       Primary Care Provider Office Phone # Fax #    Robert Meza [phone]       [place] [address]        After Care Instructions     Activity       Your activity upon discharge: activity as tolerated            Diet       Follow this diet upon discharge: Orders Placed This Encounter  Regular Diet Adult                  Follow-up Appointments     Follow-up and recommended labs and tests        Follow up with primary care provider, Robert Meza MD, within 7 days to evaluate medication change and to evaluate after surgery.  No follow up labs or test are needed.                  Your next 10 appointments already scheduled     Feb 03, 2017  1:30 PM   Adult Med Follow UP with  "Jaden Rodriguez MD   Psychiatry Clinic ([facility])    [address]  [address] [facility] [address] [phone]              Pending Results     No orders found from 1/23/2017 to 1/25/2017.            Statement of Approval     Ordered          01/29/17 1251  I have reviewed and agree with all the recommendations and orders detailed in this document.   EFFECTIVE NOW     Approved and electronically signed by:  Laura Lo MD             Admission Information        Provider Department Dept Phone    1/24/2017 Raffi Horta MD  [facility] [phone]      Your Vitals Were     Blood Pressure Pulse Temperature Respirations    135/85 mmHg 104 97  F (36.1  C) (Oral) 20    Height Weight BMI (Body Mass Index) Pulse Oximetry    1.676 m (5' 6\") 54.432 kg (120 lb) 19.38 kg/m2 95%    Last Period             01/22/2017         [employer] Information     [employer] gives you secure access to your electronic health record. If you see a primary care provider, you can also send messages to your care team and make appointments. If you have questions, please call your primary care clinic.  If you do not have a primary care provider, please call [phone] and they will assist you.        Care EveryWhere ID     This is your Care EveryWhere ID. This could be used by other organizations to access your Home medical records  [id number]           Review of your medicines      START taking        Dose / Directions    phenazopyridine 100 MG tablet   Commonly known as:  PYRIDIUM   Used for:  Acute cystitis without hematuria        Dose:  100 mg   Take 1 tablet (100 mg) by mouth 3 times daily (with meals) for 5 days   Quantity:  15 tablet   Refills:  0       vancomycin 50 mg/mL solution   Commonly known as:  VANOCIN   Indication:  Clostridium difficile Bacteria   Used for:  Acute cystitis without hematuria, C. difficile colitis        Dose:  125 mg   Take 2.5 mLs " (125 mg) by mouth 2 times daily for 10 days   Quantity:  50 mL   Refills:  0         CONTINUE these medicines which have NOT CHANGED        Dose / Directions    AMITRIPTYLINE HCL PO        Dose:  10 mg   Take 10 mg by mouth At Bedtime   Refills:  0       clindamycin 1 % lotion   Commonly known as:  CLEOCIN T        Apply topically every morning   Refills:  0       * CLONAZEPAM PO        Dose:  1 mg   Take 1 mg by mouth every morning   Refills:  0       * CLONAZEPAM PO        Dose:  2 mg   Take 2 mg by mouth At Bedtime   Refills:  0       desogestrel-ethinyl estradiol 0.15-30 MG-MCG per tablet   Commonly known as:  APRI        Dose:  1 tablet   Take 1 tablet by mouth daily   Refills:  0       HYDROXYZINE HCL PO        Dose:  100 mg   Take 100 mg by mouth daily as needed   Refills:  0       IBUPROFEN PO        Dose:  400-600 mg   Take 400-600 mg by mouth every 6 hours as needed for moderate pain   Refills:  0       LISINOPRIL PO        Dose:  10 mg   Take 10 mg by mouth daily   Refills:  0       lithium 450 MG CR tablet   Commonly known as:  ESKALITH   Used for:  Bipolar I disorder (H)        Dose:  450 mg   Take 1 tablet (450 mg) by mouth daily   Quantity:  90 tablet   Refills:  0       MIRTAZAPINE PO        Dose:  7.5 mg   Take 7.5 mg by mouth At Bedtime   Refills:  0       PROTONIX PO        Dose:  40 mg   Take 40 mg by mouth every morning (before breakfast)   Refills:  0       RELPAX PO        Dose:  20 mg   Take 20 mg by mouth at onset of headache for migraine (May repeat in 2 hours if needed)   Refills:  0       SYNTHROID PO        Dose:  50 mcg   Take 50 mcg by mouth daily   Refills:  0       TIZANIDINE HCL PO        Dose:  4 mg   Take 4 mg by mouth At Bedtime   Refills:  0       TORADOL IM        Dose:  30 mg   Inject 30 mg into the muscle daily as needed Per pt for migraines   Refills:  0       triamcinolone 0.1 % cream   Commonly known as:  KENALOG        Apply topically 2 times daily as needed for  irritation   Refills:  0       TYLENOL 8 HOUR PO        Dose:  1000 mg   Take 1,000 mg by mouth every 6 hours as needed (pain)   Refills:  0       ZOFRAN ODT PO        Dose:  4 mg   Take 4 mg by mouth every 8 hours as needed for nausea   Refills:  0       * Notice:  This list has 2 medication(s) that are the same as other medications prescribed for you. Read the directions carefully, and ask your doctor or other care provider to review them with you.         Where to get your medicines      Some of these will need a paper prescription and others can be bought over the counter. Ask your nurse if you have questions.     Bring a paper prescription for each of these medications    - phenazopyridine 100 MG tablet  - vancomycin 50 mg/mL solution             Protect others around you: Learn how to safely use, store and throw away your medicines at www.disposemymeds.org.             Medication List: This is a list of all your medications and when to take them. Check marks below indicate your daily home schedule. Keep this list as a reference.      Medications           Morning Afternoon Evening Bedtime As Needed    AMITRIPTYLINE HCL PO   Take 10 mg by mouth At Bedtime   Last time this was given:  10 mg on 1/28/2017  9:06 PM                                   clindamycin 1 % lotion   Commonly known as:  CLEOCIN T   Apply topically every morning                                   * CLONAZEPAM PO   Take 1 mg by mouth every morning   Last time this was given:  2 mg on 1/28/2017  9:06 PM                                   * CLONAZEPAM PO   Take 2 mg by mouth At Bedtime   Last time this was given:  2 mg on 1/28/2017  9:06 PM                                   desogestrel-ethinyl estradiol 0.15-30 MG-MCG per tablet   Commonly known as:  APRI   Take 1 tablet by mouth daily   Last time this was given:  1 tablet on 1/29/2017  8:22 AM                                   HYDROXYZINE HCL PO   Take 100 mg by mouth daily as needed   Last time  this was given:  50 mg on 1/26/2017  5:11 AM                                   IBUPROFEN PO   Take 400-600 mg by mouth every 6 hours as needed for moderate pain   Last time this was given:  400 mg on 1/24/2017  7:09 PM                                   LISINOPRIL PO   Take 10 mg by mouth daily   Last time this was given:  10 mg on 1/29/2017  8:21 AM                                   lithium 450 MG CR tablet   Commonly known as:  ESKALITH   Take 1 tablet (450 mg) by mouth daily   Last time this was given:  450 mg on 1/29/2017  8:21 AM                                   MIRTAZAPINE PO   Take 7.5 mg by mouth At Bedtime   Last time this was given:  7.5 mg on 1/28/2017  9:06 PM                                   phenazopyridine 100 MG tablet   Commonly known as:  PYRIDIUM   Take 1 tablet (100 mg) by mouth 3 times daily (with meals) for 5 days   Last time this was given:  100 mg on 1/29/2017  8:21 AM                                         PROTONIX PO   Take 40 mg by mouth every morning (before breakfast)   Last time this was given:  40 mg on 1/29/2017  8:22 AM                                   RELPAX PO   Take 20 mg by mouth at onset of headache for migraine (May repeat in 2 hours if needed)   Last time this was given:  20 mg on 1/26/2017 10:57 PM                                   SYNTHROID PO   Take 50 mcg by mouth daily   Last time this was given:  50 mcg on 1/29/2017  8:22 AM                                   TIZANIDINE HCL PO   Take 4 mg by mouth At Bedtime   Last time this was given:  4 mg on 1/29/2017  8:21 AM                                   TORADOL IM   Inject 30 mg into the muscle daily as needed Per pt for migraines                                   triamcinolone 0.1 % cream   Commonly known as:  KENALOG   Apply topically 2 times daily as needed for irritation                                   TYLENOL 8 HOUR PO   Take 1,000 mg by mouth every 6 hours as needed (pain)                                   vancomycin  "50 mg/mL solution   Commonly known as:  VANOCIN   Take 2.5 mLs (125 mg) by mouth 2 times daily for 10 days   Last time this was given:  125 mg on 1/29/2017  8:22 AM                                      ZOFRAN ODT PO   Take 4 mg by mouth every 8 hours as needed for nausea                                   * Notice:  This list has 2 medication(s) that are the same as other medications prescribed for you. Read the directions carefully, and ask your doctor or other care provider to review them with you.              More Information         * BLADDER INFECTION,Female (Adult)    A bladder infection (\"cystitis\" or \"UTI\") usually causes a constant urge to urinate and a burning when passing urine. Urine may be cloudy, smelly or dark. There may be pain in the lower abdomen. A bladder infection occurs when bacteria from the vaginal area enter the bladder opening (urethra). This can occur from sexual intercourse, wearing tight clothing, dehydration and other factors.  HOME CARE:  1. Drink lots of fluids (at least 6-8 glasses a day, unless you must restrict fluids for other medical reasons). This will force the medicine into your urinary system and flush the bacteria out of your body. Cranberry juice has been shown to help clear out the bacteria.  2. Avoid sexual intercourse until your symptoms are gone.  3. A bladder infection is treated with antibiotics. You may also be given Pyridium (generic = phenazopyridine) to reduce the burning sensation. This medicine will cause your urine to become a bright orange color. The orange urine may stain clothing. You may wear a pad or panty-liner to protect clothing.  PREVENTING FUTURE INFECTIONS:  1. Always wipe from front to back after a bowel movement.  2. Keep the genital area clean and dry.  3. Drink plenty of fluids each day to avoid dehydration.  4. Urinate right after intercourse to flush out the bladder.  5. Wear cotton underwear and cotton-lined panty hose; avoid tight-fitting " pants.  6. If you are on birth control pills and are having frequent bladder infections, discuss with your doctor.  FOLLOW UP: Return to this facility or see your doctor if ALL symptoms are not gone after three days of treatment.  GET PROMPT MEDICAL ATTENTION if any of the following occur:    Fever over 101 F (38.3 C)    No improvement by the third day of treatment    Increasing back or abdominal pain    Repeated vomiting; unable to keep medicine down    Weakness, dizziness or fainting    Vaginal discharge    Pain, redness or swelling in the labia (outer vaginal area)    6330-8481 [employer], [address] [place] [address]. All rights reserved. This information is not intended as a substitute for professional medical care. Always follow your healthcare professional's instructions.

## 2017-01-24 NOTE — ED PROVIDER NOTES
History     Chief Complaint:  UTI    The history is provided by the patient.      Ana Laura Wharton is a 46 year old female with a history of recurrent UTIs and C. difficile from antibiotic use who presents to the emergency department today for evaluation of a urinary tract infection. The patient states that for the past 5 days she has been experiencing some suprapubic abdominal cramping, flank pain bilaterally, dysuria, and frequency, which she states is typical when she has urinary tract infection.     Recently, the patient was treated for a UTI with Ciprofloxacin and developed a C. diff. 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 Due to this she carries a letter from Dr. Montez of the Gastroenterology department with her noting that antibiotic treatment with her should be used cautiously. For UTIs the GI physician recommends Gentamicin or IV Vancomycin as those antibiotics have limited GI penetration. She is also followed by Dr. Dennis from the Gastroenterology department at the [facility]. She denies any dark or bloody stools at this time, no nausea, fevers, vomiting, cough, hematuria, or other symptoms reported at this time.     Allergies:  Augmentin  Azithromycin  Cephalexin  Ciprofloxacin  Compazine  Metoclopramide  Minocycline  Penicillins  Reglan  Restoril  Thorazine  Abilify - Rash  Lamotrigine - Rash   Sulfa Drugs - Rash    Medications:    Lisinopril  Klonopin  Remeron  Lithium  Elavil  Atarax  Protonix  Synthroid  Necon  Relpax  Tramadol  Toradol  Benadryl  Ortho-novum     Past Medical History:     Depressive disorder  Anxiety  Migraine  GERD  Spontaneous pneumothorax    Bipolar disorder    Hypertension  C. diff    Past Surgical History:     Thoracic surgery for pneumothorax  Left shoulder surgery  Left knee arthroscopy  Surgery of cervix for pre cancerous changes     Family History:   "  Mother - Depression  Father - Lipids, Macular degeneration     Social History:  The patient was unaccompanied to the ED.  Smoking Status: Former Smoker  Alcohol Use: Negative  Marital Status:   [2]    Review of Systems   Constitutional: Negative for fever.   Respiratory: Negative for cough.    Gastrointestinal: Positive for abdominal pain. Negative for nausea, vomiting, diarrhea and blood in stool.   Genitourinary: Positive for dysuria, frequency and flank pain. Negative for hematuria.   All other systems reviewed and are negative.    Physical Exam   Vitals:  Patient Vitals for the past 24 hrs:   BP Temp Temp src Pulse Heart Rate Resp SpO2 Height Weight   01/24/17 0616 (!) 143/93 mmHg 97.3  F (36.3  C) Temporal 105 105 20 100 % 1.676 m (5' 6\") 54.432 kg (120 lb)       Physical Exam  Constitutional: Patient appears well-developed and well-nourished. There is mild distress.   Head: No external signs of trauma noted.  Neck: No JVD noted  Eyes: Pupils are equal, round, and reactive to light.   Cardiovascular: Normal rate, regular rhythm and normal heart sounds.  Exam reveals no gallop and no friction rub.  No murmur heard. Normal peripheral pulses.  Pulmonary/Chest: Effort normal and breath sounds normal. No respiratory distress. Patient has no wheezes. Patient has no rales.   Abdominal: Soft. There is no tenderness. There is mild B/L flank tenderness.  Neurological: Patient is alert and oriented to person, place, and time.   Skin: Skin is warm and dry. There is no diaphoresis noted.      Emergency Department Course     Laboratory:  Laboratory findings were communicated with the patient who voiced understanding of the findings.    CBC: WBC: 11.4 (H) o/w WNL. (HGB 11.8, )   BMP: Potassium: 3.2 (L), Creatinine: 0.96  UA: Urine Blood: Small (A), Protein Albumin: 30 (A), Leukocyte Esterase: Large (A), WBC/HPF: >182 (H), WBC Clumps: Present (A), Squamous Cells: 7 (H)    Urine Culture Aerobic Bacteria: " Pending    Interventions:  0647 ns 1000 mL IV  0649 Toradol 30 mg IV  0653 Pyridium 200 mg PO      Emergency Department Course:  Nursing notes and vitals reviewed.  I performed an exam of the patient as documented above.   IV was inserted and blood was drawn for laboratory testing, results above.  The patient provided a urine sample here in the emergency department. This was sent for laboratory testing, findings above.  At 0712 the patient was rechecked and was updated on the results of her laboratory studies.   I discussed the treatment plan with the patient. They expressed understanding of this plan and consented to admission. I discussed the patient with the Hospitalist, who will admit the patient to a monitored bed for further evaluation and treatment.  I personally reviewed the laboratory results with the patient and answered all related questions prior to admission.    Impression & Plan      Medical Decision Making:  Ana Laura Wharton is a 46 year old female who presents to the emergency department today for evaluation of dysuria. Her UA does show signs of a urinary tract infection. Please see the HPI for specifics, but the patient had been treated for C. Diff and it was apparently a sever enough infection and refractory to regular treatment that she did receive a fecal transplant. Her GI physicians at the [facility] recommended against some antibiotic classes and the patient is also allergic to some antibiotics. Because of that, there recommendation are generally for Gentamycin or Vancomycin as neither of those really cross into the intestinal lumen. As the patient still has fairly significant pain after pain medications in the ER, we will place here in the hospital for IV treatment with Gentamycin to start with. Perhaps this could be followed and continued in an infusion center in an outpatient setting, but I would like to control her pain first.     Diagnosis:    ICD-10-CM    1. Acute cystitis without hematuria  N30.00        Scribe Disclosure:  I, Jef Whalen, am serving as a scribe at 6:34 AM on 1/24/2017 to document services personally performed by Juan Stark DO, based on my observations and the provider's statements to me.    1/24/2017   [facility] EMERGENCY DEPARTMENT        Juan Stark DO  01/24/17 1611

## 2017-01-24 NOTE — IP AVS SNAPSHOT
[place] [address] [facility]    [address]    [facility] [address]    Phone:  [phone]    Fax:  [phone]                                       After Visit Summary   1/24/2017    Ana Laura Wharton    MRN: [id number]           After Visit Summary Signature Page     I have received my discharge instructions, and my questions have been answered. I have discussed any challenges I see with this plan with the nurse or doctor.    ..........................................................................................................................................  Patient/Patient Representative Signature      ..........................................................................................................................................  Patient Representative Print Name and Relationship to Patient    ..................................................               ................................................  Date                                            Time    ..........................................................................................................................................  Reviewed by Signature/Title    ...................................................              ..............................................  Date                                                            Time

## 2017-01-24 NOTE — TELEPHONE ENCOUNTER
----- Message from Kevin Lorenzo sent at 1/23/2017  4:31 PM CST -----  Regarding: Rescheduling  Contact: [phone]  Pt missed her appointment today and called to reschedule. Pt is having surgery during the next available appointment times on 2/2, and Dr Rodriguez is booked into April. Pt would like a call to discuss options for scheduling and medication management.

## 2017-01-24 NOTE — PHARMACY-AMINOGLYCOSIDE DOSING SERVICE
Pharmacy Aminoglycoside Initial Note  Date of Service 2017  Patient's  1970  46 year old, female    Weight (Actual):  54.4kg kg    Indication: Urinary Tract Infection    Current estimated CrCl = Estimated Creatinine Clearance: 62.9 mL/min (based on Cr of 0.96).    Creatinine for last 3 days  2017:  6:44 AM Creatinine 0.96 mg/dL     Nephrotoxins and other renal medications (Future)    Start     Dose/Rate Route Frequency Ordered Stop    17  gentamicin (GARAMYCIN) 80 mg in NaCl 0.9 % 50 mL intermittent infusion      80 mg  over 60 Minutes Intravenous EVERY 12 HOURS 17 1101      17 0947  lithium (ESKALITH) CR tablet 450 mg      450 mg Oral DAILY 17 0946      17 0947  lisinopril (PRINIVIL/ZESTRIL) tablet 10 mg      10 mg Oral DAILY 17 0946      17 0946  ibuprofen (ADVIL/MOTRIN) tablet 400-800 mg      400-600 mg Oral EVERY 6 HOURS PRN 17 0946            Contrast Orders - past 72 hours     None          Aminoglycoside Levels - past 2 days  No results found for requested labs within last 2 days.    Aminoglycosides IV Administrations (past 72 hours)                   gentamicin (GARAMYCIN) 100 mg in NaCl 0.9 % 50 mL intermittent infusion (mg) 100 mg New Bag 17 0840                    Plan:  1.  Start Gentamicin 80 mg (1.5 mg/kg) IV q12h.   2.  Target goals based on conventional dosing  3.  Goal peak level: 4-6 mg/L  4.  Goal trough level: </= 1 mg/L  5.  Pharmacy will continue to follow and check levels as appropriate in 1-3 Days      Renetta Watts

## 2017-01-25 PROBLEM — N30.00 ACUTE CYSTITIS: Status: ACTIVE | Noted: 2017-01-25

## 2017-01-25 LAB
ANION GAP SERPL CALCULATED.3IONS-SCNC: 9 MMOL/L (ref 3–14)
BACTERIA SPEC CULT: ABNORMAL
BUN SERPL-MCNC: 9 MG/DL (ref 7–30)
CALCIUM SERPL-MCNC: 8.4 MG/DL (ref 8.5–10.1)
CHLORIDE SERPL-SCNC: 112 MMOL/L (ref 94–109)
CO2 SERPL-SCNC: 21 MMOL/L (ref 20–32)
CREAT SERPL-MCNC: 0.93 MG/DL (ref 0.52–1.04)
ERYTHROCYTE [DISTWIDTH] IN BLOOD BY AUTOMATED COUNT: 15.3 % (ref 10–15)
GENTAMICIN SERPL-MCNC: 1.5 MG/L
GENTAMICIN SERPL-MCNC: 7.6 MG/L
GFR SERPL CREATININE-BSD FRML MDRD: 65 ML/MIN/1.7M2
GLUCOSE SERPL-MCNC: 94 MG/DL (ref 70–99)
HCT VFR BLD AUTO: 34.2 % (ref 35–47)
HGB BLD-MCNC: 10.8 G/DL (ref 11.7–15.7)
Lab: ABNORMAL
MCH RBC QN AUTO: 29.3 PG (ref 26.5–33)
MCHC RBC AUTO-ENTMCNC: 31.6 G/DL (ref 31.5–36.5)
MCV RBC AUTO: 93 FL (ref 78–100)
MICRO REPORT STATUS: ABNORMAL
MICROORGANISM SPEC CULT: ABNORMAL
PLATELET # BLD AUTO: 387 10E9/L (ref 150–450)
POTASSIUM SERPL-SCNC: 4.4 MMOL/L (ref 3.4–5.3)
RBC # BLD AUTO: 3.68 10E12/L (ref 3.8–5.2)
SODIUM SERPL-SCNC: 142 MMOL/L (ref 133–144)
SPECIMEN SOURCE: ABNORMAL
WBC # BLD AUTO: 12.3 10E9/L (ref 4–11)

## 2017-01-25 PROCEDURE — 27210995 ZZH RX 272: Performed by: PHYSICIAN ASSISTANT

## 2017-01-25 PROCEDURE — 25000125 ZZHC RX 250: Performed by: PHYSICIAN ASSISTANT

## 2017-01-25 PROCEDURE — 12000013 ZZH R&B PEDS

## 2017-01-25 PROCEDURE — 80170 ASSAY OF GENTAMICIN: CPT

## 2017-01-25 PROCEDURE — 36415 COLL VENOUS BLD VENIPUNCTURE: CPT | Performed by: PHYSICIAN ASSISTANT

## 2017-01-25 PROCEDURE — 99232 SBSQ HOSP IP/OBS MODERATE 35: CPT | Performed by: PHYSICIAN ASSISTANT

## 2017-01-25 PROCEDURE — 25000128 H RX IP 250 OP 636

## 2017-01-25 PROCEDURE — 12000011 ZZH R&B MS OVERFLOW

## 2017-01-25 PROCEDURE — 25000132 ZZH RX MED GY IP 250 OP 250 PS 637: Performed by: PHYSICIAN ASSISTANT

## 2017-01-25 PROCEDURE — 25000125 ZZHC RX 250: Performed by: HOSPITALIST

## 2017-01-25 PROCEDURE — 36415 COLL VENOUS BLD VENIPUNCTURE: CPT

## 2017-01-25 PROCEDURE — 25000128 H RX IP 250 OP 636: Performed by: HOSPITALIST

## 2017-01-25 PROCEDURE — 80048 BASIC METABOLIC PNL TOTAL CA: CPT | Performed by: PHYSICIAN ASSISTANT

## 2017-01-25 PROCEDURE — 25000125 ZZHC RX 250

## 2017-01-25 PROCEDURE — G0378 HOSPITAL OBSERVATION PER HR: HCPCS

## 2017-01-25 PROCEDURE — 25000128 H RX IP 250 OP 636: Performed by: PHYSICIAN ASSISTANT

## 2017-01-25 PROCEDURE — 85027 COMPLETE CBC AUTOMATED: CPT | Performed by: PHYSICIAN ASSISTANT

## 2017-01-25 PROCEDURE — 25000125 ZZHC RX 250: Performed by: INTERNAL MEDICINE

## 2017-01-25 RX ORDER — KETOROLAC TROMETHAMINE 30 MG/ML
30 INJECTION, SOLUTION INTRAMUSCULAR; INTRAVENOUS EVERY 6 HOURS PRN
Status: DISCONTINUED | OUTPATIENT
Start: 2017-01-25 | End: 2017-01-28

## 2017-01-25 RX ORDER — PHENAZOPYRIDINE HYDROCHLORIDE 100 MG/1
100 TABLET, FILM COATED ORAL
Status: DISCONTINUED | OUTPATIENT
Start: 2017-01-25 | End: 2017-01-29 | Stop reason: HOSPADM

## 2017-01-25 RX ORDER — HYDRALAZINE HYDROCHLORIDE 20 MG/ML
10 INJECTION INTRAMUSCULAR; INTRAVENOUS EVERY 4 HOURS PRN
Status: DISCONTINUED | OUTPATIENT
Start: 2017-01-25 | End: 2017-01-26

## 2017-01-25 RX ORDER — SODIUM CHLORIDE 450 MG/100ML
INJECTION, SOLUTION INTRAVENOUS CONTINUOUS
Status: DISCONTINUED | OUTPATIENT
Start: 2017-01-25 | End: 2017-01-27

## 2017-01-25 RX ORDER — LORAZEPAM 2 MG/ML
.5-1 INJECTION INTRAMUSCULAR EVERY 4 HOURS PRN
Status: DISCONTINUED | OUTPATIENT
Start: 2017-01-25 | End: 2017-01-29 | Stop reason: HOSPADM

## 2017-01-25 RX ORDER — HYDROXYZINE HYDROCHLORIDE 25 MG/1
50 TABLET, FILM COATED ORAL EVERY 6 HOURS PRN
Status: DISCONTINUED | OUTPATIENT
Start: 2017-01-25 | End: 2017-01-29 | Stop reason: HOSPADM

## 2017-01-25 RX ORDER — HYDROMORPHONE HYDROCHLORIDE 1 MG/ML
.3-.5 INJECTION, SOLUTION INTRAMUSCULAR; INTRAVENOUS; SUBCUTANEOUS
Status: DISCONTINUED | OUTPATIENT
Start: 2017-01-25 | End: 2017-01-27

## 2017-01-25 RX ORDER — FLUCONAZOLE 150 MG/1
150 TABLET ORAL ONCE
Status: COMPLETED | OUTPATIENT
Start: 2017-01-25 | End: 2017-01-25

## 2017-01-25 RX ORDER — ELETRIPTAN HYDROBROMIDE 20 MG/1
20 TABLET, FILM COATED ORAL
Status: DISCONTINUED | OUTPATIENT
Start: 2017-01-25 | End: 2017-01-29 | Stop reason: HOSPADM

## 2017-01-25 RX ADMIN — SODIUM CHLORIDE: 4.5 INJECTION, SOLUTION INTRAVENOUS at 12:00

## 2017-01-25 RX ADMIN — HYDRALAZINE HYDROCHLORIDE 10 MG: 20 INJECTION INTRAMUSCULAR; INTRAVENOUS at 01:21

## 2017-01-25 RX ADMIN — FLUCONAZOLE 150 MG: 150 TABLET ORAL at 18:40

## 2017-01-25 RX ADMIN — GENTAMICIN SULFATE 80 MG: 40 INJECTION, SOLUTION INTRAMUSCULAR; INTRAVENOUS at 08:11

## 2017-01-25 RX ADMIN — CLONAZEPAM 2 MG: 1 TABLET ORAL at 21:40

## 2017-01-25 RX ADMIN — AMITRIPTYLINE HYDROCHLORIDE 10 MG: 10 TABLET, FILM COATED ORAL at 21:40

## 2017-01-25 RX ADMIN — SODIUM CHLORIDE: 4.5 INJECTION, SOLUTION INTRAVENOUS at 22:02

## 2017-01-25 RX ADMIN — PHENAZOPYRIDINE HYDROCHLORIDE 100 MG: 100 TABLET ORAL at 18:39

## 2017-01-25 RX ADMIN — ONDANSETRON 4 MG: 2 INJECTION INTRAMUSCULAR; INTRAVENOUS at 02:14

## 2017-01-25 RX ADMIN — LORAZEPAM 1 MG: 2 INJECTION INTRAMUSCULAR; INTRAVENOUS at 09:07

## 2017-01-25 RX ADMIN — DESOGESTREL AND ETHINYL ESTRADIOL 1 TABLET: KIT at 10:34

## 2017-01-25 RX ADMIN — KETOROLAC TROMETHAMINE 30 MG: 30 INJECTION, SOLUTION INTRAMUSCULAR at 09:08

## 2017-01-25 RX ADMIN — HYDROMORPHONE HYDROCHLORIDE 0.5 MG: 10 INJECTION, SOLUTION INTRAMUSCULAR; INTRAVENOUS; SUBCUTANEOUS at 11:39

## 2017-01-25 RX ADMIN — LORAZEPAM 1 MG: 2 INJECTION INTRAMUSCULAR; INTRAVENOUS at 22:01

## 2017-01-25 RX ADMIN — ACETAMINOPHEN 650 MG: 325 TABLET, FILM COATED ORAL at 10:34

## 2017-01-25 RX ADMIN — SODIUM CHLORIDE: 9 INJECTION, SOLUTION INTRAVENOUS at 11:39

## 2017-01-25 RX ADMIN — OXYCODONE HYDROCHLORIDE 10 MG: 5 TABLET ORAL at 10:35

## 2017-01-25 RX ADMIN — LISINOPRIL 10 MG: 10 TABLET ORAL at 10:35

## 2017-01-25 RX ADMIN — GENTAMICIN SULFATE 50 MG: 40 INJECTION, SOLUTION INTRAMUSCULAR; INTRAVENOUS at 21:35

## 2017-01-25 RX ADMIN — OXYCODONE HYDROCHLORIDE 10 MG: 5 TABLET ORAL at 03:32

## 2017-01-25 RX ADMIN — MIRTAZAPINE 7.5 MG: 7.5 TABLET, FILM COATED ORAL at 21:41

## 2017-01-25 RX ADMIN — ACETAMINOPHEN 650 MG: 325 TABLET, FILM COATED ORAL at 00:05

## 2017-01-25 RX ADMIN — PHENAZOPYRIDINE HYDROCHLORIDE 100 MG: 100 TABLET ORAL at 14:20

## 2017-01-25 RX ADMIN — ONDANSETRON 4 MG: 2 INJECTION INTRAMUSCULAR; INTRAVENOUS at 18:42

## 2017-01-25 RX ADMIN — ELETRIPTAN HYDROBROMIDE 20 MG: 20 TABLET, FILM COATED ORAL at 10:35

## 2017-01-25 RX ADMIN — SODIUM CHLORIDE: 9 INJECTION, SOLUTION INTRAVENOUS at 02:00

## 2017-01-25 RX ADMIN — HYDROXYZINE HYDROCHLORIDE 50 MG: 25 TABLET ORAL at 10:34

## 2017-01-25 RX ADMIN — HYDROMORPHONE HYDROCHLORIDE 0.5 MG: 10 INJECTION, SOLUTION INTRAMUSCULAR; INTRAVENOUS; SUBCUTANEOUS at 15:52

## 2017-01-25 RX ADMIN — HYDROMORPHONE HYDROCHLORIDE 0.5 MG: 10 INJECTION, SOLUTION INTRAMUSCULAR; INTRAVENOUS; SUBCUTANEOUS at 19:21

## 2017-01-25 RX ADMIN — ONDANSETRON 4 MG: 2 INJECTION INTRAMUSCULAR; INTRAVENOUS at 07:41

## 2017-01-25 ASSESSMENT — ACTIVITIES OF DAILY LIVING (ADL)
TOILETING: 0-->INDEPENDENT
RETIRED_COMMUNICATION: 0-->UNDERSTANDS/COMMUNICATES WITHOUT DIFFICULTY
AMBULATION: 0-->INDEPENDENT
TRANSFERRING: 0-->INDEPENDENT
DRESS: 0-->INDEPENDENT
NUMBER_OF_TIMES_PATIENT_HAS_FALLEN_WITHIN_LAST_SIX_MONTHS: 1
FALL_HISTORY_WITHIN_LAST_SIX_MONTHS: YES
SWALLOWING: 0-->SWALLOWS FOODS/LIQUIDS WITHOUT DIFFICULTY
RETIRED_EATING: 0-->INDEPENDENT
BATHING: 0-->INDEPENDENT
COGNITION: 0 - NO COGNITION ISSUES REPORTED

## 2017-01-25 NOTE — ED NOTES
PRIMARY DIAGNOSIS: GASTROENTERITIS  Primary Symptoms: abdominal pain    OUTPATIENT/OBSERVATION GOALS TO BE MET BEFORE DISCHARGE:    1. Orthostatic symptoms (BP decrease or HR increase with patient upright)?  N/A  2. Documented urine output: Yes  3. Tolerating PO fluid: Yes  4. Nausea/Vomiting/Diarrhea (if present) symptoms improved: No. Still nauseated, no diarrhea since arriving to OBS unit.     Is patient a falls risk? No  Falls armband on?  No  Within Arm's Reach? No.Reason not within arm's reach is: ind  Bed alarm turned on?   No  Personal alarm in place and turned on?   No    Pt continues to be nauseous and painful. Does not feel well, but was able to keep pills down. Pt feeling relief from pain after IV Dilaudid.

## 2017-01-25 NOTE — ED NOTES
PRIMARY DIAGNOSIS: GASTROENTERITIS  Primary Symptoms: abdominal pain    OUTPATIENT/OBSERVATION GOALS TO BE MET BEFORE DISCHARGE:    1. Orthostatic symptoms (BP decrease or HR increase with patient upright)?  N/A  2. Documented urine output: Yes  3. Tolerating PO fluid: Yes  4. Nausea/Vomiting/Diarrhea (if present) symptoms improved: No    Is patient a falls risk? No  Falls armband on?  No  Within Arm's Reach? No.Reason not within arm's reach is: ind  Bed alarm turned on?   No  Personal alarm in place and turned on?   No    Pt has abdominal pain that does not seem to be helped by pain medication. She feels it is in her bladder. ID consult ordered for pt recent hx of c diff. Stool sample needed.

## 2017-01-25 NOTE — ED NOTES
PRIMARY DIAGNOSIS: GASTROENTERITIS  Primary Symptoms: abdominal pain    OUTPATIENT/OBSERVATION GOALS TO BE MET BEFORE DISCHARGE:    1. Orthostatic symptoms (BP decrease or HR increase with patient upright)?  N/A  2. Documented urine output: Yes  3. Tolerating PO fluid: Yes  4. Nausea/Vomiting/Diarrhea (if present) symptoms improved: No. Still nauseated, no diarrhea since arriving to OBS unit.     Is patient a falls risk? No  Falls armband on?  No  Within Arm's Reach? No.Reason not within arm's reach is: ind  Bed alarm turned on?   No  Personal alarm in place and turned on?   No    Pt BP elevated, , prn IV hydralzaine given. Patient currently denies abdominal pain. C/o of feeling as if she has a yeast infection. Will report to PA in morning.  Patient is on enteric precautions, no diarrhea since arriving to OBS unit. Stool sample needed. IV fluids infusing. Will continue to monitor, assess, and offer supportive cares.

## 2017-01-25 NOTE — TELEPHONE ENCOUNTER
Jaden Rodriguez MD Bove, Michelle, RN        Phone Number: [phone]                     I can see her next Friday 2/3 at 1:30 pm.      Msg routed to scheduling to contact pt for appt.

## 2017-01-25 NOTE — PROGRESS NOTES
Infection Prevention:    Patient placed on Enteric Precautions because of possible Cdiff. Please contact Infection Prevention with any questions/concerns at *[phone].    Marivel Treviño, ICP

## 2017-01-25 NOTE — PROGRESS NOTES
[facility]  Hospitalist Progress Note  Lolita Cross PA-C 01/25/2017    Reason for Stay (Diagnosis): UTI, possible pyelonephritis         Assessment and Plan:      Summary of Stay: Ana Laura Wharton is a 46 year old female admitted on 1/24/2017 with UTI and concern for possible pyelonephritis.       Problem List:   1. UTI, possible pyelonephritis - hx of interstitial cystitis with recurrent UTI complicated by c diff infections in the past. UA abnormal, now growing 50,000-100,000 colonies of Proteus mirabilis. Given recurrent cdiff, gastroenterolgist recommended gentamycin only for UTI. ID consulted and agrees, length of treatment is unclear, differ for UTI vs pyelo. Continues to have significant abdominal pain, nausea and vomiting this AM, also has migraine which may be contributing. Medications adjusted, added Ativan for nausea, pyridium for bladder discomfort, reordered home hydralazine at 50 mg 4 times daily PRN. Continue abx.   2. Hx of c diff - first episode of cdiff 4/2016 which was initially treated with Flagyl which she was unable to tolerate.  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  Last cdiff PCR 10/2016 negative. Has not had diarrhea while here but if she does, send stool sample for c diff.  3. Hypokalemia - replaced per protocol, resolved.   4. Migraine headache - usually uses IM Toradol and/or Relpax at home. Will add IV toradol and pt may use own home Relpax due to cost.   5. HTN - continue home meds  6. GERD - continue home meds  7. Hypothyroidism - continue levothyroxine  8. Depression with anxiety, bipolar disorder and hx of suicide attempt - continue home meds    DVT Prophylaxis: Low Risk/Ambulatory with no VTE prophylaxis indicated  Code Status: Full Code  Discharge Dispo: admit to IP given ongoing sx, have been unable to advance diet due to N/V, still having considerable pain  Estimated Disch  "Date / # of Days until Disch: 1-2 days        Interval History (Subjective):      10/10 abd pain today, nausea and now vomiting. Also complains of migraine headache. Not tolerating PO intake                  Physical Exam:      Last Vital Signs:  /106 mmHg  Pulse 78  Temp(Src) 96.3  F (35.7  C) (Oral)  Resp 16  Ht 1.676 m (5' 6\")  Wt 54.432 kg (120 lb)  BMI 19.38 kg/m2  SpO2 99%  LMP 01/22/2017      Intake/Output Summary (Last 24 hours) at 01/25/17 1052  Last data filed at 01/25/17 0816   Gross per 24 hour   Intake   1236 ml   Output    830 ml   Net    406 ml       Constitutional: Awake, alert, cooperative, appears uncomfortable but not acute distress   Respiratory: Clear to auscultation bilaterally, no crackles or wheezing   Cardiovascular: Tachycardic but regular rhythm, normal S1 and S2, and no murmur noted   Abdomen: Normal bowel sounds, soft, non-distended, tender to palpation in suprapubic region and bilateral CVA tenderness   Skin: No rashes, no cyanosis, dry to touch   Neuro: Alert and oriented x3, no weakness, numbness, memory loss   Extremities: No edema, normal range of motion   Other(s):        All other systems: Negative          Medications:      All current medications were reviewed with changes reflected in problem list.         Data:      All new lab and imaging data was reviewed.   Labs:    Recent Labs  Lab 01/24/17  0622   CULT 50,000 to 100,000 colonies/mL Proteus mirabilisSusceptibility testing in progress*  Canceled, Test credited Duplicate request       Recent Labs  Lab 01/25/17  0700   WBC 12.3*   HGB 10.8*   HCT 34.2*   MCV 93          Recent Labs  Lab 01/25/17  0700 01/24/17  0644   WBC 12.3* 11.4*   HGB 10.8* 11.8   HCT 34.2* 37.0   MCV 93 89    402       Recent Labs  Lab 01/24/17  0622   COLOR Light Yellow   APPEARANCE Cloudy   URINEGLC Negative   URINEBILI Negative   URINEKETONE Negative   SG 1.013   UBLD Small*   URINEPH 6.0   PROTEIN 30*   NITRITE Negative "   LEUKEST Large*   RBCU 0   WBCU >182*      Imaging:   No results found for this or any previous visit (from the past 48 hour(s)).    Lolita Cross PA-C

## 2017-01-25 NOTE — ED NOTES
"PRIMARY DIAGNOSIS: GASTROENTERITIS  Primary Symptoms: abdominal pain    OUTPATIENT/OBSERVATION GOALS TO BE MET BEFORE DISCHARGE:    1. Orthostatic symptoms (BP decrease or HR increase with patient upright)?  N/A  2. Documented urine output: Yes  3. Tolerating PO fluid: Yes  4. Nausea/Vomiting/Diarrhea (if present) symptoms improved: No. Still nauseated, no diarrhea since arriving to OBS unit.     Is patient a falls risk? No  Falls armband on?  No  Within Arm's Reach? No.Reason not within arm's reach is: ind  Bed alarm turned on?   No  Personal alarm in place and turned on?   No    Pt still nauseous after Zofran and painful. Says she \"feels really sick.\" Pt is shivering.   "

## 2017-01-25 NOTE — ED NOTES
PRIMARY DIAGNOSIS: GASTROENTERITIS  Primary Symptoms: abdominal pain    OUTPATIENT/OBSERVATION GOALS TO BE MET BEFORE DISCHARGE:    1. Orthostatic symptoms (BP decrease or HR increase with patient upright)?  N/A  2. Documented urine output: Yes  3. Tolerating PO fluid: Yes  4. Nausea/Vomiting/Diarrhea (if present) symptoms improved: No    Is patient a falls risk? No  Falls armband on?  No  Within Arm's Reach? No.Reason not within arm's reach is: ind  Bed alarm turned on?   No  Personal alarm in place and turned on?   No

## 2017-01-25 NOTE — PROGRESS NOTES
[facility]  Infectious Disease Progress Note          Assessment and Plan:   IMPRESSION:    1.  A 46-year-old female with complicated history admitted with acute urinary tract infection, increasing urinary symptoms, some mild systemic symptoms, not clear pyelonephritis, current cultures pending.    2.  History of recurrent urinary tract infections, particularly in the last several months.    3.  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    4.  Some chronic diarrhea, question irritable bowel disease component.    5.  History of interstitial cystitis, but definite urinary tract infections on top of that.    6.  History of amoxicillin, cephalosporins, azithromycin, Cipro, minocycline, sulfa allergies.    7.  With the relapsing Clostridium difficile, history of chronic vancomycin use, which was stopped due to headaches, not logically a side effect of vancomycin orally, which is not absorbed.    8.  History of depression and bipolar disorder.        RECOMMENDATIONS:    1.  Continue gentamicin for now; note Dr. Dennis's plan to use gentamicin, perception that possibly the relapse after stool transplant was due to a course of fosfomycin.  The usual thought would be that fosfomycin has no activity against gastrointestinal tract anaerobic bacteria and minimal activity in general and should be among the best antimicrobial agents to use for patients with C. diff, gentamicin would be also without activity against anaerobes, but not likely to be superior at preventing C. diff.    2.  Would normally do vancomycin orally as a preventive given her long history, but we will hold off for now, get a stool C. diff, obviously if positive  "again will have to retreat again at this point.Not having diarrhea    3.  How much antibiotics to do currently is unclear, it is not clear this is pyelonephritis, mostly lower urinary tract symptoms, obviously change antibiotics if the current cultures direct.    4 No fever, bad HA, feels poorly, not clear how much UTI driven        Interval History:   No fever, cx proteus, WBC 12 K, no diarrhea, C diff not yet done, BC neg. HA is major sxs currently              Medications:       phenazopyridine  100 mg Oral TID w/meals     sodium chloride (PF)  3 mL Intracatheter Q8H     amitriptyline (ELAVIL) tablet 10 mg  10 mg Oral At Bedtime     clonazePAM (klonoPIN) tablet 2 mg  2 mg Oral At Bedtime     lithium  450 mg Oral Daily     mirtazapine (REMERON) tablet TABS 7.5 mg  7.5 mg Oral At Bedtime     desogestrel-ethinyl estradiol  1 tablet Oral Daily     levothyroxine (SYNTHROID/LEVOTHROID) tablet 50 mcg  50 mcg Oral Daily     lisinopril (PRINIVIL/ZESTRIL) tablet 10 mg  10 mg Oral Daily     pantoprazole (PROTONIX) EC tablet 40 mg  40 mg Oral QAM AC     sodium chloride (PF)  3 mL Intracatheter Q8H     gentamicin  80 mg Intravenous Q12H                  Physical Exam:   Blood pressure 171/97, pulse 78, temperature 98.6  F (37  C), temperature source Oral, resp. rate 16, height 1.676 m (5' 6\"), weight 54.432 kg (120 lb), last menstrual period 01/22/2017, SpO2 97 %.  Wt Readings from Last 2 Encounters:   01/24/17 54.432 kg (120 lb)   09/12/16 57.335 kg (126 lb 6.4 oz)     Vital Signs with Ranges  Temp:  [95.9  F (35.5  C)-98.6  F (37  C)] 98.6  F (37  C)  Pulse:  [78-96] 78  Heart Rate:  [] 92  Resp:  [16-20] 16  BP: (133-190)/() 171/97 mmHg  SpO2:  [95 %-100 %] 97 %    Constitutional: Awake, alert, cooperative, mild apparent distress bad HA   Lungs: Clear to auscultation bilaterally, no crackles or wheezing   Cardiovascular: Regular rate and rhythm, normal S1 and S2, and no murmur noted   Abdomen: Normal bowel " sounds, soft, non-distended, some flank tender   Skin: No rashes, no cyanosis, no edema   Other:           Data:   All microbiology laboratory data reviewed.  Recent Labs   Lab Test  01/25/17   0700  01/24/17   0644  04/10/15   1031   WBC  12.3*  11.4*  12.0*   HGB  10.8*  11.8  11.7   HCT  34.2*  37.0  36.2   MCV  93  89  91   PLT  387  402  459*     Recent Labs   Lab Test  01/25/17   0700 01/24/17   0644  04/10/15   1031   CR  0.93  0.96  1.02     No lab results found.  Recent Labs   Lab Test  01/24/17   0622  04/10/14   1430   CULT  50,000 to 100,000 colonies/mL Proteus mirabilis  Susceptibility testing in progress  *  Canceled, Test credited Duplicate request  No Salmonella, Shigella, Campylobacter, E. coli O157, Aeromonas, or Plesiomonas   isolated.

## 2017-01-25 NOTE — TELEPHONE ENCOUNTER
Kevin Lorenzo Michelle, RN                     She is took that appointment and is scheduled for 2/3 at 1:30pm.       Routed to provider as FYNANCY.

## 2017-01-25 NOTE — PHARMACY-AMINOGLYCOSIDE DOSING SERVICE
Pharmacy Aminoglycoside Follow-Up Note  Date of Service 2017  Patient's  1970   46 year old, female    Weight (Actual): 54 kg    Indication: Urinary Tract Infection  Current Gentamicin regimen:  80 mg IV q12h  Day of therapy: 2    Target goals based on conventional dosing  Goal Peak level: 4-6 mg/L  Goal Trough level: <1 mg/L    Current estimated CrCl: Estimated Creatinine Clearance: 64.9 mL/min (based on Cr of 0.93).    Creatinine for last 3 days  2017:  6:44 AM Creatinine 0.96 mg/dL  2017:  7:00 AM Creatinine 0.93 mg/dL    Nephrotoxins and other renal medications (Future)    Start     Dose/Rate Route Frequency Ordered Stop    17 2200  gentamicin (GARAMYCIN) 50 mg in NaCl 0.9 % 50 mL intermittent infusion      50 mg  over 60 Minutes Intravenous EVERY 12 HOURS 17 1611      17 0900  ketorolac (TORADOL) injection 30 mg      30 mg Intravenous EVERY 6 HOURS PRN 17 0839 17 0859    17 0947  lithium (ESKALITH) CR tablet 450 mg      450 mg Oral DAILY 17 0946      17 0947  lisinopril (PRINIVIL/ZESTRIL) tablet 10 mg      10 mg Oral DAILY 17 0946      17 0946  ibuprofen (ADVIL/MOTRIN) tablet 400-800 mg      400-600 mg Oral EVERY 6 HOURS PRN 17 0946            Contrast Orders - past 72 hours     None          Aminoglycoside Levels - past 2 days  2017:  9:50 AM Gentamicin Level 7.6 mg/L;  7:00 AM Gentamicin Level 1.5 mg/L    Aminoglycosides IV Administrations (past 72 hours)                   gentamicin (GARAMYCIN) 80 mg in NaCl 0.9 % 50 mL intermittent infusion (mg) 80 mg New Bag 17 0811     80 mg New Bag 17 193    gentamicin (GARAMYCIN) 100 mg in NaCl 0.9 % 50 mL intermittent infusion (mg) 100 mg New Bag 17 0840                Pharmacokinetic Analysis  Patient Height 66 inches          Patient Weight 54 Kg Serum Creatinine:  0.9 mg/dl     Dosing Weight  Kg Calculated CrCl: 65 ml/min     Ideal Body Weight  Kg                       Drug: gent 80 Dosing Regimen: q12h      Time of Prior Dose:            Infusion Start:  Stop:          Dose Given 80 mg          Pre-Dose Level  mcg/ml          First Post-Dose Level 7.6 mcg/ml Drawn at: 0.67 Hours post-infusion    2nd Post-dose level 1.5  Drawn at: 10.25 Hours post-infusion    Time between levels 9.58 hours          Dosing Interval 12 hours                      Kd 0.169 hr-1 T 1/2 =  4.1 hours       Extrapolated Peak 8.5 mcg/ml          Extrapolated trough 1.32 mcg/ml          Volume of Distribution 9.9 L (uses extrapolated Cp min rather than measured)    L/Kg = #DIV/0! L/kg               Projected Levels      Desired Peak: 6 mcg/ml   Dose one: 50 Peak = 5.3 mcg/ml   Desired Interval: 12 hours   Interval One: 12 Trough = 0.8 mcg/ml   New Dose 56 mg   Dose Two: 50 Peak = 5.3 mcg/ml        Interval Two 12 Trough = 0.8 mcg/ml         Interpretation of levels and current regimen:  Aminoglycoside levels are outside of goal range    Has serum creatinine changed greater than 50% in the last 72 hours: No      Renal function: Stable    Plan  1. Decrease dose to 50mg IV q12h    2.  Method of evaluation: 2 post dose levels    3. Pharmacy will continue to follow and check levels  as appropriate in 1-3 Days    Shanon Archer

## 2017-01-25 NOTE — ED NOTES
PRIMARY DIAGNOSIS: GASTROENTERITIS  Primary Symptoms: abdominal pain    OUTPATIENT/OBSERVATION GOALS TO BE MET BEFORE DISCHARGE:    1. Orthostatic symptoms (BP decrease or HR increase with patient upright)?  N/A  2. Documented urine output: Yes  3. Tolerating PO fluid: Yes  4. Nausea/Vomiting/Diarrhea (if present) symptoms improved: No. Still nauseated, no diarrhea since arriving to OBS unit.     Is patient a falls risk? No  Falls armband on?  No  Within Arm's Reach? No.Reason not within arm's reach is: ind  Bed alarm turned on?   No  Personal alarm in place and turned on?   No    Pt SBP was [id number], prn IV hydralzaine given BP came down to 133/74. Patient currently c/o abdominal pain/ bladder pain. Prn oxycodone given for pain 10/10 and IV Zofran given for nausea. Patient did have 1 episode of the dry heaves.  Patient is on enteric precautions, no diarrhea since arriving to OBS unit. Stool sample needed. IV fluids infusing. Will continue to monitor, assess, and offer supportive cares.

## 2017-01-26 ENCOUNTER — APPOINTMENT (OUTPATIENT)
Dept: CT IMAGING | Facility: CLINIC | Age: 47
DRG: 690 | End: 2017-01-26
Attending: INTERNAL MEDICINE
Payer: COMMERCIAL

## 2017-01-26 LAB
ANION GAP SERPL CALCULATED.3IONS-SCNC: 9 MMOL/L (ref 3–14)
B-HCG SERPL-ACNC: <1 IU/L
BASOPHILS # BLD AUTO: 0.1 10E9/L (ref 0–0.2)
BASOPHILS NFR BLD AUTO: 0.7 %
BUN SERPL-MCNC: 7 MG/DL (ref 7–30)
CALCIUM SERPL-MCNC: 8.8 MG/DL (ref 8.5–10.1)
CHLORIDE SERPL-SCNC: 107 MMOL/L (ref 94–109)
CO2 SERPL-SCNC: 24 MMOL/L (ref 20–32)
CREAT SERPL-MCNC: 0.96 MG/DL (ref 0.52–1.04)
DIFFERENTIAL METHOD BLD: ABNORMAL
EOSINOPHIL # BLD AUTO: 0.3 10E9/L (ref 0–0.7)
EOSINOPHIL NFR BLD AUTO: 2.6 %
ERYTHROCYTE [DISTWIDTH] IN BLOOD BY AUTOMATED COUNT: 15.5 % (ref 10–15)
GFR SERPL CREATININE-BSD FRML MDRD: 62 ML/MIN/1.7M2
GLUCOSE SERPL-MCNC: 99 MG/DL (ref 70–99)
HCG UR QL: NEGATIVE
HCT VFR BLD AUTO: 33.6 % (ref 35–47)
HGB BLD-MCNC: 10.6 G/DL (ref 11.7–15.7)
IMM GRANULOCYTES # BLD: 0 10E9/L (ref 0–0.4)
IMM GRANULOCYTES NFR BLD: 0.3 %
LACTATE BLD-SCNC: 1 MMOL/L (ref 0.7–2.1)
LYMPHOCYTES # BLD AUTO: 3.1 10E9/L (ref 0.8–5.3)
LYMPHOCYTES NFR BLD AUTO: 25.7 %
MCH RBC QN AUTO: 28.4 PG (ref 26.5–33)
MCHC RBC AUTO-ENTMCNC: 31.5 G/DL (ref 31.5–36.5)
MCV RBC AUTO: 90 FL (ref 78–100)
MONOCYTES # BLD AUTO: 1 10E9/L (ref 0–1.3)
MONOCYTES NFR BLD AUTO: 8.3 %
NEUTROPHILS # BLD AUTO: 7.5 10E9/L (ref 1.6–8.3)
NEUTROPHILS NFR BLD AUTO: 62.4 %
NRBC # BLD AUTO: 0 10*3/UL
NRBC BLD AUTO-RTO: 0 /100
PLATELET # BLD AUTO: 398 10E9/L (ref 150–450)
POTASSIUM SERPL-SCNC: 4.1 MMOL/L (ref 3.4–5.3)
RBC # BLD AUTO: 3.73 10E12/L (ref 3.8–5.2)
SODIUM SERPL-SCNC: 140 MMOL/L (ref 133–144)
WBC # BLD AUTO: 12.1 10E9/L (ref 4–11)

## 2017-01-26 PROCEDURE — 25000132 ZZH RX MED GY IP 250 OP 250 PS 637: Performed by: INTERNAL MEDICINE

## 2017-01-26 PROCEDURE — 12000000 ZZH R&B MED SURG/OB

## 2017-01-26 PROCEDURE — 36415 COLL VENOUS BLD VENIPUNCTURE: CPT | Performed by: HOSPITALIST

## 2017-01-26 PROCEDURE — 25500064 ZZH RX 255 OP 636: Performed by: HOSPITALIST

## 2017-01-26 PROCEDURE — 85025 COMPLETE CBC W/AUTO DIFF WBC: CPT | Performed by: PHYSICIAN ASSISTANT

## 2017-01-26 PROCEDURE — 99233 SBSQ HOSP IP/OBS HIGH 50: CPT | Performed by: INTERNAL MEDICINE

## 2017-01-26 PROCEDURE — 84702 CHORIONIC GONADOTROPIN TEST: CPT | Performed by: PHYSICIAN ASSISTANT

## 2017-01-26 PROCEDURE — 25000125 ZZHC RX 250: Performed by: INTERNAL MEDICINE

## 2017-01-26 PROCEDURE — 25000125 ZZHC RX 250: Performed by: PHYSICIAN ASSISTANT

## 2017-01-26 PROCEDURE — 25000132 ZZH RX MED GY IP 250 OP 250 PS 637: Performed by: PHYSICIAN ASSISTANT

## 2017-01-26 PROCEDURE — 27210995 ZZH RX 272: Performed by: PHYSICIAN ASSISTANT

## 2017-01-26 PROCEDURE — 36415 COLL VENOUS BLD VENIPUNCTURE: CPT | Performed by: PHYSICIAN ASSISTANT

## 2017-01-26 PROCEDURE — 99223 1ST HOSP IP/OBS HIGH 75: CPT | Performed by: CLINICAL NURSE SPECIALIST

## 2017-01-26 PROCEDURE — 25000125 ZZHC RX 250: Performed by: HOSPITALIST

## 2017-01-26 PROCEDURE — 25000128 H RX IP 250 OP 636: Performed by: HOSPITALIST

## 2017-01-26 PROCEDURE — 74177 CT ABD & PELVIS W/CONTRAST: CPT

## 2017-01-26 PROCEDURE — 25000132 ZZH RX MED GY IP 250 OP 250 PS 637: Performed by: CLINICAL NURSE SPECIALIST

## 2017-01-26 PROCEDURE — 83605 ASSAY OF LACTIC ACID: CPT | Performed by: HOSPITALIST

## 2017-01-26 PROCEDURE — 80048 BASIC METABOLIC PNL TOTAL CA: CPT | Performed by: PHYSICIAN ASSISTANT

## 2017-01-26 PROCEDURE — 81025 URINE PREGNANCY TEST: CPT | Performed by: INTERNAL MEDICINE

## 2017-01-26 PROCEDURE — 25000125 ZZHC RX 250: Performed by: CLINICAL NURSE SPECIALIST

## 2017-01-26 RX ORDER — IOPAMIDOL 755 MG/ML
500 INJECTION, SOLUTION INTRAVASCULAR ONCE
Status: COMPLETED | OUTPATIENT
Start: 2017-01-26 | End: 2017-01-26

## 2017-01-26 RX ORDER — HYDRALAZINE HYDROCHLORIDE 20 MG/ML
10 INJECTION INTRAMUSCULAR; INTRAVENOUS EVERY 4 HOURS PRN
Status: DISCONTINUED | OUTPATIENT
Start: 2017-01-26 | End: 2017-01-29 | Stop reason: HOSPADM

## 2017-01-26 RX ORDER — ELETRIPTAN HYDROBROMIDE 20 MG/1
20 TABLET, FILM COATED ORAL
Status: DISCONTINUED | OUTPATIENT
Start: 2017-01-26 | End: 2017-01-26

## 2017-01-26 RX ORDER — GRANULES FOR ORAL 3 G/1
3 POWDER ORAL ONCE
Status: COMPLETED | OUTPATIENT
Start: 2017-01-26 | End: 2017-01-26

## 2017-01-26 RX ORDER — LIDOCAINE 50 MG/G
3 PATCH TOPICAL EVERY 24 HOURS
Status: DISCONTINUED | OUTPATIENT
Start: 2017-01-26 | End: 2017-01-27

## 2017-01-26 RX ORDER — LABETALOL HYDROCHLORIDE 5 MG/ML
10 INJECTION, SOLUTION INTRAVENOUS
Status: DISCONTINUED | OUTPATIENT
Start: 2017-01-26 | End: 2017-01-29 | Stop reason: HOSPADM

## 2017-01-26 RX ORDER — DIPHENHYDRAMINE HYDROCHLORIDE 50 MG/ML
50 INJECTION INTRAMUSCULAR; INTRAVENOUS EVERY 6 HOURS PRN
Status: DISCONTINUED | OUTPATIENT
Start: 2017-01-26 | End: 2017-01-29 | Stop reason: HOSPADM

## 2017-01-26 RX ORDER — ONDANSETRON 2 MG/ML
8 INJECTION INTRAMUSCULAR; INTRAVENOUS EVERY 6 HOURS
Status: DISCONTINUED | OUTPATIENT
Start: 2017-01-26 | End: 2017-01-27

## 2017-01-26 RX ORDER — ONDANSETRON 2 MG/ML
4 INJECTION INTRAMUSCULAR; INTRAVENOUS EVERY 6 HOURS
Status: DISCONTINUED | OUTPATIENT
Start: 2017-01-26 | End: 2017-01-26

## 2017-01-26 RX ORDER — ONDANSETRON 2 MG/ML
8 INJECTION INTRAMUSCULAR; INTRAVENOUS EVERY 6 HOURS
Status: DISCONTINUED | OUTPATIENT
Start: 2017-01-26 | End: 2017-01-26

## 2017-01-26 RX ADMIN — PHENAZOPYRIDINE HYDROCHLORIDE 100 MG: 100 TABLET ORAL at 20:10

## 2017-01-26 RX ADMIN — HYDROMORPHONE HYDROCHLORIDE 0.5 MG: 10 INJECTION, SOLUTION INTRAMUSCULAR; INTRAVENOUS; SUBCUTANEOUS at 16:38

## 2017-01-26 RX ADMIN — HYDROXYZINE HYDROCHLORIDE 50 MG: 25 TABLET ORAL at 05:11

## 2017-01-26 RX ADMIN — VANCOMYCIN HYDROCHLORIDE 125 MG: 100 INJECTION, POWDER, LYOPHILIZED, FOR SOLUTION INTRAVENOUS at 20:12

## 2017-01-26 RX ADMIN — ONDANSETRON 8 MG: 2 INJECTION INTRAMUSCULAR; INTRAVENOUS at 21:17

## 2017-01-26 RX ADMIN — LORAZEPAM 1 MG: 2 INJECTION INTRAMUSCULAR; INTRAVENOUS at 20:00

## 2017-01-26 RX ADMIN — MIRTAZAPINE 7.5 MG: 7.5 TABLET, FILM COATED ORAL at 21:20

## 2017-01-26 RX ADMIN — LORAZEPAM 1 MG: 2 INJECTION INTRAMUSCULAR; INTRAVENOUS at 14:04

## 2017-01-26 RX ADMIN — LITHIUM CARBONATE 450 MG: 450 TABLET ORAL at 08:40

## 2017-01-26 RX ADMIN — LIDOCAINE 2 PATCH: 50 PATCH CUTANEOUS at 20:03

## 2017-01-26 RX ADMIN — SODIUM CHLORIDE: 4.5 INJECTION, SOLUTION INTRAVENOUS at 20:12

## 2017-01-26 RX ADMIN — SODIUM CHLORIDE 20 ML: 9 INJECTION, SOLUTION INTRAVENOUS at 18:58

## 2017-01-26 RX ADMIN — ONDANSETRON 8 MG: 2 INJECTION INTRAMUSCULAR; INTRAVENOUS at 15:12

## 2017-01-26 RX ADMIN — HYDRALAZINE HYDROCHLORIDE 10 MG: 20 INJECTION INTRAMUSCULAR; INTRAVENOUS at 15:01

## 2017-01-26 RX ADMIN — ENOXAPARIN SODIUM 40 MG: 40 INJECTION SUBCUTANEOUS at 20:03

## 2017-01-26 RX ADMIN — PHENAZOPYRIDINE HYDROCHLORIDE 100 MG: 100 TABLET ORAL at 08:41

## 2017-01-26 RX ADMIN — HYDROMORPHONE HYDROCHLORIDE 0.5 MG: 10 INJECTION, SOLUTION INTRAMUSCULAR; INTRAVENOUS; SUBCUTANEOUS at 12:44

## 2017-01-26 RX ADMIN — HYDROMORPHONE HYDROCHLORIDE 0.5 MG: 10 INJECTION, SOLUTION INTRAMUSCULAR; INTRAVENOUS; SUBCUTANEOUS at 08:52

## 2017-01-26 RX ADMIN — CLONAZEPAM 2 MG: 1 TABLET ORAL at 21:20

## 2017-01-26 RX ADMIN — IOPAMIDOL 60 ML: 755 INJECTION, SOLUTION INTRAVENOUS at 18:57

## 2017-01-26 RX ADMIN — ELETRIPTAN HYDROBROMIDE 20 MG: 20 TABLET, FILM COATED ORAL at 20:11

## 2017-01-26 RX ADMIN — FOSFOMYCIN TROMETHAMINE 3 G: 3 POWDER ORAL at 20:07

## 2017-01-26 RX ADMIN — DESOGESTREL AND ETHINYL ESTRADIOL 1 TABLET: KIT at 08:52

## 2017-01-26 RX ADMIN — ATROPA BELLADONNA AND OPIUM 1 SUPPOSITORY: 16.2; 3 SUPPOSITORY RECTAL at 22:11

## 2017-01-26 RX ADMIN — ONDANSETRON 4 MG: 2 INJECTION INTRAMUSCULAR; INTRAVENOUS at 11:12

## 2017-01-26 RX ADMIN — LISINOPRIL 10 MG: 10 TABLET ORAL at 08:41

## 2017-01-26 RX ADMIN — ELETRIPTAN HYDROBROMIDE 20 MG: 20 TABLET, FILM COATED ORAL at 22:57

## 2017-01-26 RX ADMIN — HYDROMORPHONE HYDROCHLORIDE 0.5 MG: 10 INJECTION, SOLUTION INTRAMUSCULAR; INTRAVENOUS; SUBCUTANEOUS at 22:55

## 2017-01-26 RX ADMIN — AMITRIPTYLINE HYDROCHLORIDE 10 MG: 10 TABLET, FILM COATED ORAL at 21:20

## 2017-01-26 RX ADMIN — HYDROMORPHONE HYDROCHLORIDE 0.5 MG: 10 INJECTION, SOLUTION INTRAMUSCULAR; INTRAVENOUS; SUBCUTANEOUS at 05:10

## 2017-01-26 RX ADMIN — PANTOPRAZOLE SODIUM 40 MG: 40 TABLET, DELAYED RELEASE ORAL at 08:41

## 2017-01-26 RX ADMIN — LEVOTHYROXINE SODIUM 50 MCG: 50 TABLET ORAL at 08:41

## 2017-01-26 RX ADMIN — SODIUM CHLORIDE: 4.5 INJECTION, SOLUTION INTRAVENOUS at 08:51

## 2017-01-26 RX ADMIN — GENTAMICIN SULFATE 50 MG: 40 INJECTION, SOLUTION INTRAMUSCULAR; INTRAVENOUS at 09:56

## 2017-01-26 ASSESSMENT — PAIN DESCRIPTION - DESCRIPTORS
DESCRIPTORS: BURNING;CONSTANT
DESCRIPTORS: BURNING;CONSTANT

## 2017-01-26 NOTE — PLAN OF CARE
Problem: Goal Outcome Summary  Goal: Goal Outcome Summary  Outcome: No Change  Patient complaining of migraine, back, and bladder pain 5-10/10. Dilaudid x2. Zofran once for nausea. IVF infusing 100 ml/hr. Continuing IV gent. One time dose of PO fluconazole given. Poor appetite. Continuing to encourage fluids. Large emesis of bile this evening, ativan x1. Voiding but no stool today. Enteric precautions in place. Still need C.diff sample. Nursing will continue to monitor.

## 2017-01-26 NOTE — PROGRESS NOTES
[facility]  Hospitalist Progress Note  Alejandro Del Castillo MD 01/26/2017    Reason for Stay (Diagnosis): UTI, possible pyelonephritis         Assessment and Plan:      Summary of Stay: Ana Laura Wharton is a 46 year old female admitted on 1/24/2017 with UTI and concern for possible pyelonephritis. Complicated past medical hx, including treatment for c.diff with fecal transplant.       Problem List:   1. UTI, possible pyelonephritis - hx of interstitial cystitis with recurrent UTI complicated by c diff infections in the past. Urine culture growing 50,000-100,000 colonies of Proteus mirabilis. Given recurrent cdiff, her gastroenterolgist recommended gentamycin for UTI as less penetration of gut and less change for recurrence of c.diff.     -- Her symptoms are mostly consistent with a lower tract infection and cystitis, she reports some  urinary symptoms and frequency at baseline, although worsened now with the acute infection   -- ID consulted, urine is resistant to gentamycin, allergic to multiple other antibiotics. Discussed with Dr. Alegre, plan to try PO fosfomycin, if unable to tolerate, then need to re-evaluate other IV options  -- Will obtain a CT abdomen w/o contrast to eval for abscess, stones etc. Of note, she has a known history of cystic lesion in her stomach which was worked up previously by GI at [facility] and had EUS/biopsies etc.   -- check hcg for completeness sake    2.  Patient reports inadequate pain control, exacerbated by the cystitis and her migraines - will schedule her zofran, ativan also available for nausea, does not tolerate compazine, continue PRN IV and oral narcotics agents, pyridium. Multi-modal regimen.   - - will also request pain team consult.     3.  Hx of c diff - first episode of cdiff 4/2016 which was initially treated with Flagyl which she was unable to tolerate.  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  Last cdiff PCR 10/2016 negative. Has not had diarrhea while here but if she does, send stool sample for c diff.  -- starting PO vanco for prophylaxis     2. Hypokalemia - replaced per protocol,    3. Migraine headache - usually uses IM Toradol and/or Relpax at home. Will add IV toradol   4. HTN - continue home meds, prn hydralazine and labetalol available, BP worsened due to pain   5. GERD - continue home meds  6. Hypothyroidism - continue levothyroxine  7. Depression with anxiety, bipolar disorder and hx of suicide attempt - continue home meds    DVT Prophylaxis: given reduced mobility and ongoing need for hospitalization, will add lovenox   Code Status: Full Code  Estimated Disch Date / # of Days until Disch: > 2 days        Interval History (Subjective):      Chart reviewed and patient seen, case discussed with Dr. Alegre and nursing staff, patient reports ongoing issues with nausea, vomiting, ongoing abdominal pain, having difficulty tolerating PO, still having burning in urine, increased frequency, worse than her baseline cystitis, does not feel better, urine cultures show organism resistant to gentamicin                   Physical Exam:      Last Vital Signs:  Vital signs:  Temp: 98.9  F (37.2  C) Temp src: Oral BP: (!) 182/107 mmHg (hydralazine given)   Heart Rate: 112 Resp: 16 SpO2: 98 % O2 Device: None (Room air)   Height: 167.6 cm (5' 6\") Weight: 54.432 kg (120 lb)  Estimated body mass index is 19.38 kg/(m^2) as calculated from the following:    Height as of this encounter: 1.676 m (5' 6\").    Weight as of this encounter: 54.432 kg (120 lb).      Constitutional: Awake, alert, cooperative, appears uncomfortable but not acute distress   Respiratory: Clear to auscultation bilaterally, no crackles or wheezing   Cardiovascular: Tachycardic but regular rhythm, normal S1 and S2, and no murmur noted   Abdomen: Normal bowel sounds, soft, non-distended, tender to palpation in suprapubic " region and mid abdomen, no guarding    Skin: No rashes, no cyanosis, dry to touch   Neuro: Alert and oriented x3, no weakness, numbness, memory loss   Extremities: No edema, normal range of motion   Other(s):        All other systems: Negative          Medications:      All current medications were reviewed with changes reflected in problem list.         Data:      All new lab and imaging data was reviewed.   Labs:    Recent Labs  Lab 01/24/17  0622   CULT 50,000 to 100,000 colonies/mL Proteus mirabilis*  Canceled, Test credited Duplicate request       Recent Labs  Lab 01/26/17  0530   WBC 12.1*   HGB 10.6*   HCT 33.6*   MCV 90          Recent Labs  Lab 01/26/17  0530 01/25/17  0700 01/24/17  0644   WBC 12.1* 12.3* 11.4*   HGB 10.6* 10.8* 11.8   HCT 33.6* 34.2* 37.0   MCV 90 93 89    387 402       Recent Labs  Lab 01/24/17  0622   COLOR Light Yellow   APPEARANCE Cloudy   URINEGLC Negative   URINEBILI Negative   URINEKETONE Negative   SG 1.013   UBLD Small*   URINEPH 6.0   PROTEIN 30*   NITRITE Negative   LEUKEST Large*   RBCU 0   WBCU >182*      Imaging:   No results found for this or any previous visit (from the past 24 hour(s)).

## 2017-01-26 NOTE — PLAN OF CARE
Problem: Goal Outcome Summary  Goal: Goal Outcome Summary  Outcome: No Change  BP elevated 160-170/90s-111, HR tachy at times 104.  RR 16, O2 sats 98% RA.  Dr. Del Castillo updated, new parameters received. Hydralazine requested from pharmacy.  LS clear.  Positive bowel sounds/flatus.  C/o nausea with little relief from IV Zofran this am, given IV Ativan at 1400.  Multiple emesis, bile/watery mix.  No stool since admit.  Voiding good amounts, urine cloudy/orange.  Declined pyridium at noon due to nausea.  Dilaudid IV x2 for bladder pain.  Seen by Dr. Alegre today, discontinued Gentamycin, start PO Fosfomycin x1, unable to take when scheduled due to nausea.  Pain consult completed, new orders received, awaiting verification from pharmacy.  Calls appropriately, able to make needs known.  Plan for transfer to 5th floor when nurse/bed available.      At 1450 patient called and stated she feels like she may faint.  Sitting on edge of bed.  Assisted to bed, HOB elevated.  /107, , 10mg IV Hydralazine given.

## 2017-01-26 NOTE — PLAN OF CARE
"Problem: Goal Outcome Summary  Goal: Goal Outcome Summary  Outcome: No Change  AO, VSS-tach low 100's, /99. Denies any n/v and diarrhea. Triggered sepsis lactic came back at 1.  Pt CO of burning sensation and cramp in jim area- states,\" I dont believe the antibiotics are working I feel as though I have a really bad UTI.\" PRN medication given pt resting comfortably.         "

## 2017-01-26 NOTE — CONSULTS
[facility]  Pain Service Consultation     Date of Admission:  1/24/2017  Date of Consult (When I saw the patient): 01/26/2017    Assessment and Plan  Ana Laura Wharton is a 46 year old female who was admitted on 1/24/2017. I was asked by Hospitalist Alejandro Del Castillo MD to see the patient for pain control, cystitis.    Met patient as she was sitting on the edge of the bed retching. Repeated clear light green emesis. Given her discomfort I was unable to obtain a full history. She did explain that deep in her low abdomen was the source of her pain. She also explained that she is unable to take Reglan due intolerance. I did escort the patient to the bathroom where she was unable to void and continued to retch. I asked her bedside nurse Zuleima Martinez RN to try a B&O suppository, IV Benadryl and Lidoderm patches for comfort. I discussed the case with Hospitalist Alejandro Del Castillo MD. The patient is transferring from second floor to fifth floor.      1) Acute bladder spasms with marked rigor and vomiting   History of migraines    2)  Patient with chronic migraine pain, is not on chronic opioid therapy   [place] [facility] database review:  Clonazepam 1 mg (270 tablets obtained 12/12/2016) Lorazepam 1 mg (single doses obtained x 3 December 2016) Hydrocodone/Acetaminophen 5/325 mg (30 tablets obtained 10/21/2016); Percocet 5/325 mg (15 tablets obtained 8/29/2016   0 mg Daily Morphine Equivalent  Patient has a mild opioid tolerance.     Patient's opioid use thus far: 2 mg IV Dilaudid and 10 mg Oxycodone = 35 mg Daily Morphine Equivalent    3)  Opioid induced side-effects:  -Constipation - No stool x 5 days per patient report  -Nausea/Vomit - YES, due to cystitis  -Sedation - NO    4) Other/Related:    -Depression/anxiety - Bipolar disorder with depression and anxiety. Hospitalization 4/10/2015 for auditory hallucinations  -Disease of addiction - 3/23/2014 intentionally overdosed on clonopin with 15, 1 mg pills. Hospitalized  at [facility] from 3/27/15 thru 3/31/15 after an intentional overdose of 700 mg of trazodone     PLAN:   1)  Doubt she will be able to tolerate oral adjuvants given her repeated emesis during assessment. Benadryl 50 mg IV every 6 hours prn for bladder spasms.    2)  Opium-Belladonna 30-16.2 mg Suppository every 6 hours prn for bladder spasm    3)  Multimodal Medication Therapy  Topical: Lidoderm patches   NSAIDS: Ibuprofen 400-800  mg every 6 hours prn (doubt it will help during acute pain with nausea/emesis)  Muscle Relaxants: Tizanidine 4 mg every 6 hours prn for migraine  Adjuvants: Atarax 50 mg every 6 hours prn  Antidepressants/anxiolytics: Home medications: Amitriptyline 10 mg every evening; Clonazepam 2 mg at bedtime; Lithium  mg Daily; Remeron 7.5 mg at bedtime  Opioids:  Opium-Belladonna 30-16.2 mg Suppository every 6 hours prn for bladder spasm    4)  Non-medication interventions  Heating pad or ice prn    5)  Constipation Prophylaxis  Recent history of relapsing Clostridium Difficile colitis- no stool x 5 days    6) DC safety  -Opioids are not recommended for this patient for discharge given history of attempted suicide    Time Spent on This Encounter  I spent  35 minutes is assessment of the patient and discussion with the patient and family.  Another 35 minutes in review of chart, documentation and discussion with the health care team.    Deisy Cervantes MS, RN, CNS, APRN, ACHPN, FAACVPR  Pain and Palliative Care  Pager [phone]  Office [phone]       Reason for Consult  Reason for consult: I was asked by Hospitalist Alejandro Del Castillo MD to see the patient for pain control, cystitis.    Primary Care Physician  Primary Care Physician:Robert Meza MD  Pain Specialist: NONE    Chief Complaint  UTI    History is obtained from the patient and electronic health record    History of Present Illness  Ana Laura Wharton is a 46 year old female who presents with recurrent UTI concerning for  "pyelonephritis. 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      CURRENT PAIN:  Her pain is located in the lower abdomen  It is described as Miserable, Penetrating and Unbearable  She rates it as ranging between 7/10 and 10/10  The average is 10/10 on a scale of 0-10  Currently it is rated as 10/10  It improves \"I haven't found a way\"  It worsens by \"trying to move\"  She has been compliant with the recommendations while in the hospital.         D.I.R.E Score: Patient Selection for Chronic Opioid Analgesia    For each factor, rate the patient's score from 1 - 3 based on the explanations on the right.       Diagnosis             1         1 = Benign chronic condition with minimal objective findings or no definite medical diagnosis.  Examples:  fibromyalgia, migraine, headaches, non-specific back pain.  2 = Slowly progressive condition concordant with moderate pain, or fixed condition with moderate objective findings.  Examples: failed back surgery syndrome, back pain with moderate degenerative changes, neuropathic pain.  3 = Advanced condition concordant with severe pain with objective findings.  Examples: severe ischemic vascular disease, advanced neuropathy, severe spinal stenosis.    Intractability             3         1 = Few therapies have been tried and the patient takes a passive role in his/her pain management process.   2 = Most costmary treatments have been tried but the patient is not fully engaged in the pain management process, or barriers prevent (insurance, transportation, medical illness)  3 = Patient fully engaged in a spectrum of appropriate treatments but with inadequate response.    Risk   (Risk = Total of P+C+R+S below)       Psychological             1         1 = Serious personality dysfunction or mental illness interfering with care.  Examples: personality disorder, severe affective disorder, significant personality issues.  2 = Personality or mental " health interferes moderately.  Example: depression or anxiety disorder.  3 = Good communication with the clinic.  No significant personality dysfunction or mental illness.       Chemical      Health             1         1 = Active or very recent use of illicit drugs, excessive alcohol, or prescription drug abuse.  2 = Chemical coper (uses medications to cope with stress) or history of chemical dependency in remission.  3 = No CD history.  Not drug-focused or chemically reliant       Reliability             2         1 = History of numerous problems: medication misuse, missed appointments, rarely follows through.  2 = Occasional difficulties with compliance, but generally reliable.  3 = Highly reliable patient with medications, appointments and treatment.       Social      Support             1         1= Life in chaos.  Little family support and few close relationships.  Loss of most normal life roles.  2 = Reduction in some relationships and life roles.  3 = Supportive family/close relationships.  Involved in work or school and no social isolation.    Efficacy score             2         1 = Poor function or minimal pain relief despite moderate to high doses.  2 = Moderate benefit with function improved in a number of ways (or insufficient info - hasn't tried opioid yet or very low doses or too short a trial.  3 = Good improvement in pain and function and quality of life with stable doses over time.                                    11    Total score = D + I + R + E    Score 7-13: Not a suitable candidate for long-term opioid analgesia  Score 14-21: May be a good candidate for long-term opioid analgesia    Copyright 2013 Dorian Guaman MD, The DIRE Score: Predicting Outcomes of Opioid Prescribing for Chronic Pain. The Journal of Pain. 7(9) (September), 2006:671-681    Past Medical History  I have reviewed this patient's medical history and updated it with pertinent information if needed.   Past Medical History    Diagnosis Date     Depressive disorder      Anxiety      Bipolar disorder (H)      Gastro-oesophageal reflux disease      Migraine      Spontaneous pneumothorax      x3, resolved w/ pleurodesis      Hypothyroidism 4/1/2015     Essential hypertension 7/8/2015     C. difficile colitis      Suicide attempt (H)      Past Medical History   Diagnosis Date     Depressive disorder      Anxiety      Bipolar disorder (H)      Gastro-oesophageal reflux disease      Migraine      Spontaneous pneumothorax      x3, resolved w/ pleurodesis      Hypothyroidism 4/1/2015     Essential hypertension 7/8/2015     C. difficile colitis      Suicide attempt (H)        Past Surgical History  I have reviewed this patient's surgical history and updated it with pertinent information if needed.  Past Surgical History   Procedure Laterality Date     Thoracic surgery       for pneumothorax, pleurodesis and lobe resection     Orthopedic surgery       L shoulder     Arthroscopy knee       L knee     Cervix surgery       for pre-cancerous changes     Left knee surgery       Past Surgical History   Procedure Laterality Date     Thoracic surgery       for pneumothorax, pleurodesis and lobe resection     Orthopedic surgery       L shoulder     Arthroscopy knee       L knee     Cervix surgery       for pre-cancerous changes     Left knee surgery         Prior to Admission Medications  Prior to Admission Medications   Prescriptions Last Dose Informant Patient Reported? Taking?   AMITRIPTYLINE HCL PO 1/23/2017 at Unknown time  Yes Yes   Sig: Take 10 mg by mouth At Bedtime   Acetaminophen (TYLENOL 8 HOUR PO)  Self Yes Yes   Sig: Take 1,000 mg by mouth every 6 hours as needed (pain)    CLONAZEPAM PO 1/23/2017 at Unknown time  Yes Yes   Sig: Take 1 mg by mouth every morning   CLONAZEPAM PO 1/23/2017 at Unknown time  Yes Yes   Sig: Take 2 mg by mouth At Bedtime   Eletriptan Hydrobromide (RELPAX PO)   Yes Yes   Sig: Take 20 mg by mouth at onset of headache  "for migraine (May repeat in 2 hours if needed)   HYDROXYZINE HCL PO   Yes Yes   Sig: Take 100 mg by mouth daily as needed   IBUPROFEN PO   Yes Yes   Sig: Take 400-600 mg by mouth every 6 hours as needed for moderate pain   Ketorolac Tromethamine (TORADOL IM)  Self Yes Yes   Sig: Inject 30 mg into the muscle daily as needed Per pt for migraines   LISINOPRIL PO 1/23/2017 at Unknown time  Yes Yes   Sig: Take 10 mg by mouth daily   Levothyroxine Sodium (SYNTHROID PO) 1/23/2017 at Unknown time  Yes Yes   Sig: Take 50 mcg by mouth daily   MIRTAZAPINE PO 1/23/2017 at Unknown time  Yes Yes   Sig: Take 7.5 mg by mouth At Bedtime   Ondansetron (ZOFRAN ODT PO)   Yes Yes   Sig: Take 4 mg by mouth every 8 hours as needed for nausea   Pantoprazole Sodium (PROTONIX PO) 1/23/2017 at Unknown time  Yes Yes   Sig: Take 40 mg by mouth every morning (before breakfast)    TIZANIDINE HCL PO 1/23/2017 at Unknown time  Yes Yes   Sig: Take 4 mg by mouth At Bedtime   clindamycin (CLEOCIN T) 1 % lotion 1/23/2017 at Unknown time  Yes Yes   Sig: Apply topically every morning   desogestrel-ethinyl estradiol (APRI) 0.15-30 MG-MCG per tablet 1/23/2017 at Unknown time  Yes Yes   Sig: Take 1 tablet by mouth daily   lithium (ESKALITH) 450 MG CR tablet 1/23/2017 at Unknown time  No Yes   Sig: Take 1 tablet (450 mg) by mouth daily   triamcinolone (KENALOG) 0.1 % cream   Yes Yes   Sig: Apply topically 2 times daily as needed for irritation      Facility-Administered Medications: None     Allergies  Allergies   Allergen Reactions     Amoxicillin-Pot Clavulanate      PN: LW Reaction: Itching, Pruritis     Azithromycin      Other reaction(s): Dermatitis     Cephalexin      PN: LW Reaction: Itching, Pruritis     Ciprofloxacin Other (See Comments)     Joint pain cdiff     Compazine [Prochlorperazine] Other (See Comments)     dystonia     Metoclopramide Other (See Comments)     \"I feel like I am crawling out of my skin\"     Minocycline Nausea and Vomiting     " "PN: DIARRHEA, VOMITING, AND STOMACH CRAMPS     Penicillins Itching     Reglan [Metoclopramide Hcl] Other (See Comments)     Sensation of \"crawling out of skin\"     Restoril [Temazepam]      Thorazine [Chlorpromazine] Other (See Comments)     dystonia     Abilify [Aripiprazole] Rash     Lamotrigine Rash     Severe drug rash - contraindication to receiving again. Skin peeling.      Sulfa Drugs Rash       Social History  I have reviewed this patient's social history and updated it with pertinent information if needed. Ana Laura Wharton  reports that she quit smoking about 20 years ago. Her smoking use included Cigarettes. She has never used smokeless tobacco. She reports that she does not drink alcohol or use illicit drugs.    Family History  I have reviewed this patient's family history and updated it with pertinent information if needed.   Family History   Problem Relation Age of Onset     Depression Mother      Lipids Father      hyperlipidemia     Macular Degeneration Father      Family history of addiction  YES    Review of Systems  The 10 point Review of Systems is negative other than noted in the HPI or here.     Physical Exam  Temp:  [98.3  F (36.8  C)-99  F (37.2  C)] 98.9  F (37.2  C)  Heart Rate:  [] 94  Resp:  [16-18] 16  BP: (160-171)/() 163/99 mmHg  SpO2:  [95 %-98 %] 98 %  120 lbs 0 oz  GEN:  Acutely ill appearing  female who is alert, oriented x 3, appears in acute distress.   HEENT:  Normocephalic/atraumatic, no scleral icterus, no nasal discharge, mouth moist.  CV:  Tachycardic at 118, S1, S2; no murmurs or other irregularities noted.  +3 DP/PT pulses bilaterally; no edema BLE.  RESP:  Clear to auscultation bilaterally without rales/rhonchi/wheezing/retractions.  Symmetric chest rise on inhalation noted.  Normal respiratory effort.  ABD:  Rounded, soft, tender suprapubic area/non-distended.  +BS  EXT:  Edema & pulses as noted above.  CMS intact x 4.     M/S:   Denies tender with " palpation of extremities and spine.    SKIN:  Warm and dry to touch, no exanthems noted in the visualized areas.    NEURO: Symmetric strength +5/5.  Sensation to touch intact all extremities.   There is no area of allodynia or hyperesthesia.  PAIN BEHAVIOR: Cooperative  Psych:  Anxious affect, cooperative, difficult in conversation due to retching.       Data  Results for orders placed or performed during the hospital encounter of 01/24/17   UA with Microscopic   Result Value Ref Range    Color Urine Light Yellow     Appearance Urine Cloudy     Glucose Urine Negative NEG mg/dL    Bilirubin Urine Negative NEG    Ketones Urine Negative NEG mg/dL    Specific Gravity Urine 1.013 1.003 - 1.035    Blood Urine Small (A) NEG    pH Urine 6.0 5.0 - 7.0 pH    Protein Albumin Urine 30 (A) NEG mg/dL    Urobilinogen mg/dL Normal 0.0 - 2.0 mg/dL    Nitrite Urine Negative NEG    Leukocyte Esterase Urine Large (A) NEG    Source Midstream Urine     WBC Urine >182 (H) 0 - 2 /HPF    RBC Urine 0 0 - 2 /HPF    WBC Clumps Present (A) NEG /HPF    Squamous Epithelial /HPF Urine 7 (H) 0 - 1 /HPF   CBC with platelets differential   Result Value Ref Range    WBC 11.4 (H) 4.0 - 11.0 10e9/L    RBC Count 4.15 3.8 - 5.2 10e12/L    Hemoglobin 11.8 11.7 - 15.7 g/dL    Hematocrit 37.0 35.0 - 47.0 %    MCV 89 78 - 100 fl    MCH 28.4 26.5 - 33.0 pg    MCHC 31.9 31.5 - 36.5 g/dL    RDW 14.9 10.0 - 15.0 %    Platelet Count 402 150 - 450 10e9/L    Diff Method Automated Method     % Neutrophils 67.6 %    % Lymphocytes 20.5 %    % Monocytes 7.8 %    % Eosinophils 3.2 %    % Basophils 0.6 %    % Immature Granulocytes 0.3 %    Nucleated RBCs 0 0 /100    Absolute Neutrophil 7.7 1.6 - 8.3 10e9/L    Absolute Lymphocytes 2.3 0.8 - 5.3 10e9/L    Absolute Monocytes 0.9 0.0 - 1.3 10e9/L    Absolute Eosinophils 0.4 0.0 - 0.7 10e9/L    Absolute Basophils 0.1 0.0 - 0.2 10e9/L    Abs Immature Granulocytes 0.0 0 - 0.4 10e9/L    Absolute Nucleated RBC 0.0    Basic  metabolic panel   Result Value Ref Range    Sodium 139 133 - 144 mmol/L    Potassium 3.2 (L) 3.4 - 5.3 mmol/L    Chloride 105 94 - 109 mmol/L    Carbon Dioxide 26 20 - 32 mmol/L    Anion Gap 8 3 - 14 mmol/L    Glucose 80 70 - 99 mg/dL    Urea Nitrogen 8 7 - 30 mg/dL    Creatinine 0.96 0.52 - 1.04 mg/dL    GFR Estimate 62 >60 mL/min/1.7m2    GFR Estimate If Black 76 >60 mL/min/1.7m2    Calcium 9.1 8.5 - 10.1 mg/dL   Magnesium   Result Value Ref Range    Magnesium 1.8 1.6 - 2.3 mg/dL   Potassium   Result Value Ref Range    Potassium 4.1 3.4 - 5.3 mmol/L   Basic metabolic panel   Result Value Ref Range    Sodium 142 133 - 144 mmol/L    Potassium 4.4 3.4 - 5.3 mmol/L    Chloride 112 (H) 94 - 109 mmol/L    Carbon Dioxide 21 20 - 32 mmol/L    Anion Gap 9 3 - 14 mmol/L    Glucose 94 70 - 99 mg/dL    Urea Nitrogen 9 7 - 30 mg/dL    Creatinine 0.93 0.52 - 1.04 mg/dL    GFR Estimate 65 >60 mL/min/1.7m2    GFR Estimate If Black 78 >60 mL/min/1.7m2    Calcium 8.4 (L) 8.5 - 10.1 mg/dL   CBC with platelets   Result Value Ref Range    WBC 12.3 (H) 4.0 - 11.0 10e9/L    RBC Count 3.68 (L) 3.8 - 5.2 10e12/L    Hemoglobin 10.8 (L) 11.7 - 15.7 g/dL    Hematocrit 34.2 (L) 35.0 - 47.0 %    MCV 93 78 - 100 fl    MCH 29.3 26.5 - 33.0 pg    MCHC 31.6 31.5 - 36.5 g/dL    RDW 15.3 (H) 10.0 - 15.0 %    Platelet Count 387 150 - 450 10e9/L   Gentamicin level   Result Value Ref Range    Gentamicin Level 1.5 mg/L   Gentamicin level   Result Value Ref Range    Gentamicin Level 7.6 mg/L   Basic metabolic panel   Result Value Ref Range    Sodium 140 133 - 144 mmol/L    Potassium 4.1 3.4 - 5.3 mmol/L    Chloride 107 94 - 109 mmol/L    Carbon Dioxide 24 20 - 32 mmol/L    Anion Gap 9 3 - 14 mmol/L    Glucose 99 70 - 99 mg/dL    Urea Nitrogen 7 7 - 30 mg/dL    Creatinine 0.96 0.52 - 1.04 mg/dL    GFR Estimate 62 >60 mL/min/1.7m2    GFR Estimate If Black 76 >60 mL/min/1.7m2    Calcium 8.8 8.5 - 10.1 mg/dL   CBC with platelets differential   Result Value  Ref Range    WBC 12.1 (H) 4.0 - 11.0 10e9/L    RBC Count 3.73 (L) 3.8 - 5.2 10e12/L    Hemoglobin 10.6 (L) 11.7 - 15.7 g/dL    Hematocrit 33.6 (L) 35.0 - 47.0 %    MCV 90 78 - 100 fl    MCH 28.4 26.5 - 33.0 pg    MCHC 31.5 31.5 - 36.5 g/dL    RDW 15.5 (H) 10.0 - 15.0 %    Platelet Count 398 150 - 450 10e9/L    Diff Method Automated Method     % Neutrophils 62.4 %    % Lymphocytes 25.7 %    % Monocytes 8.3 %    % Eosinophils 2.6 %    % Basophils 0.7 %    % Immature Granulocytes 0.3 %    Nucleated RBCs 0 0 /100    Absolute Neutrophil 7.5 1.6 - 8.3 10e9/L    Absolute Lymphocytes 3.1 0.8 - 5.3 10e9/L    Absolute Monocytes 1.0 0.0 - 1.3 10e9/L    Absolute Eosinophils 0.3 0.0 - 0.7 10e9/L    Absolute Basophils 0.1 0.0 - 0.2 10e9/L    Abs Immature Granulocytes 0.0 0 - 0.4 10e9/L    Absolute Nucleated RBC 0.0    Lactic acid level STAT   Result Value Ref Range    Lactic Acid 1.0 0.7 - 2.1 mmol/L   Urine Culture   Result Value Ref Range    Specimen Description Midstream Urine     Special Requests Specimen received in preservative     Culture Micro 50,000 to 100,000 colonies/mL Proteus mirabilis (A)     Micro Report Status FINAL 01/25/2017     Organism: 50,000 to 100,000 colonies/mL Proteus mirabilis        Susceptibility    50,000 to 100,000 colonies/ml proteus mirabilis (eh) -  (no method available)     AMPICILLIN >=32 Resistant  ug/mL     CEFAZOLIN Value in next row  ug/mL      8 SusceptibleCefazolin EH breakpoints are for the treatment of uncomplicated urinary tract infections.  For the treatment of systemic infections, please contact the laboratory for additional testing.     CEFOXITIN Value in next row  ug/mL      8 SusceptibleCefazolin EH breakpoints are for the treatment of uncomplicated urinary tract infections.  For the treatment of systemic infections, please contact the laboratory for additional testing.     CEFTAZIDIME Value in next row  ug/mL      8 SusceptibleCefazolin EH breakpoints are for the treatment of  uncomplicated urinary tract infections.  For the treatment of systemic infections, please contact the laboratory for additional testing.     CEFTRIAXONE Value in next row  ug/mL      8 SusceptibleCefazolin TAMMIE breakpoints are for the treatment of uncomplicated urinary tract infections.  For the treatment of systemic infections, please contact the laboratory for additional testing.     CIPROFLOXACIN Value in next row  ug/mL      8 SusceptibleCefazolin TAMMIE breakpoints are for the treatment of uncomplicated urinary tract infections.  For the treatment of systemic infections, please contact the laboratory for additional testing.     GENTAMICIN Value in next row  ug/mL      8 SusceptibleCefazolin TAMMIE breakpoints are for the treatment of uncomplicated urinary tract infections.  For the treatment of systemic infections, please contact the laboratory for additional testing.     LEVOFLOXACIN Value in next row  ug/mL      8 SusceptibleCefazolin TAMMIE breakpoints are for the treatment of uncomplicated urinary tract infections.  For the treatment of systemic infections, please contact the laboratory for additional testing.     NITROFURANTOIN Value in next row  ug/mL      8 SusceptibleCefazolin TAMMIE breakpoints are for the treatment of uncomplicated urinary tract infections.  For the treatment of systemic infections, please contact the laboratory for additional testing.     TOBRAMYCIN Value in next row  ug/mL      8 SusceptibleCefazolin TAMMIE breakpoints are for the treatment of uncomplicated urinary tract infections.  For the treatment of systemic infections, please contact the laboratory for additional testing.     Trimethoprim/Sulfa Value in next row  ug/mL      8 SusceptibleCefazolin TAMMIE breakpoints are for the treatment of uncomplicated urinary tract infections.  For the treatment of systemic infections, please contact the laboratory for additional testing.     AMPICILLIN/SULBACTAM Value in next row  ug/mL      8  SusceptibleCefazolin TAMMIE breakpoints are for the treatment of uncomplicated urinary tract infections.  For the treatment of systemic infections, please contact the laboratory for additional testing.     Piperacillin/Tazo Value in next row  ug/mL      8 SusceptibleCefazolin TAMMIE breakpoints are for the treatment of uncomplicated urinary tract infections.  For the treatment of systemic infections, please contact the laboratory for additional testing.     CEFEPIME Value in next row  ug/mL      8 SusceptibleCefazolin TAMMIE breakpoints are for the treatment of uncomplicated urinary tract infections.  For the treatment of systemic infections, please contact the laboratory for additional testing.   Urine Culture Aerobic Bacterial   Result Value Ref Range    Specimen Description Unspecified Urine     Culture Micro Canceled, Test credited Duplicate request     Micro Report Status FINAL 01/24/2017

## 2017-01-26 NOTE — PROGRESS NOTES
[facility]  Infectious Disease Progress Note          Assessment and Plan:   IMPRESSION:    1.  A 46-year-old female with complicated history admitted with acute urinary tract infection, increasing urinary symptoms, some mild systemic symptoms, not clear pyelonephritis, current cultures pending.    2.  History of recurrent urinary tract infections, particularly in the last several months.    3.  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    4.  Some chronic diarrhea, question irritable bowel disease component.    5.  History of interstitial cystitis, but definite urinary tract infections on top of that.    6.  History of amoxicillin, cephalosporins, azithromycin, Cipro, minocycline, sulfa allergies.    7.  With the relapsing Clostridium difficile, history of chronic vancomycin use, which was stopped due to headaches, not logically a side effect of vancomycin orally, which is not absorbed.    8.  History of depression and bipolar disorder.   9 Hx migraines, active       RECOMMENDATIONS:    1.  Gent R organism, DC gent, despite prior perceived fosphomycin as c diff cause, it is logical choice, give 1 dose now  , a lot of nausea if cannot keep down, will need Iv agent   2.  Would do vanco, illogical HA is a side effect of this topical GI agent, bid preventive dosing   3.  How much antibiotics to do currently is unclear, it is not clear this is pyelonephritis, mostly lower urinary tract symptoms, obviously change antibiotics if the current cultures direct.    4 No fever, bad HA, feels poorly, not clear how much UTI driven implying simple bladder infection wo pyelo        Interval History:   No fever, cx proteus R gent, WBC  "12 K, no diarrhea, C diff not yet done, BC neg. HA is major sxs currently              Medications:       ondansetron  4 mg Intravenous Q6H     vancomycin  125 mg Oral BID     fosfomycin  3 g Oral Once     phenazopyridine  100 mg Oral TID w/meals     sodium chloride (PF)  3 mL Intracatheter Q8H     amitriptyline (ELAVIL) tablet 10 mg  10 mg Oral At Bedtime     clonazePAM (klonoPIN) tablet 2 mg  2 mg Oral At Bedtime     lithium  450 mg Oral Daily     mirtazapine (REMERON) tablet TABS 7.5 mg  7.5 mg Oral At Bedtime     desogestrel-ethinyl estradiol  1 tablet Oral Daily     levothyroxine (SYNTHROID/LEVOTHROID) tablet 50 mcg  50 mcg Oral Daily     lisinopril (PRINIVIL/ZESTRIL) tablet 10 mg  10 mg Oral Daily     pantoprazole (PROTONIX) EC tablet 40 mg  40 mg Oral QAM AC     sodium chloride (PF)  3 mL Intracatheter Q8H                  Physical Exam:   Blood pressure 163/99, pulse 78, temperature 98.9  F (37.2  C), temperature source Oral, resp. rate 16, height 1.676 m (5' 6\"), weight 54.432 kg (120 lb), last menstrual period 01/22/2017, SpO2 98 %.  Wt Readings from Last 2 Encounters:   01/24/17 54.432 kg (120 lb)   09/12/16 57.335 kg (126 lb 6.4 oz)     Vital Signs with Ranges  Temp:  [98.3  F (36.8  C)-99  F (37.2  C)] 98.9  F (37.2  C)  Heart Rate:  [] 94  Resp:  [16-18] 16  BP: (160-171)/() 163/99 mmHg  SpO2:  [95 %-98 %] 98 %    Constitutional: Awake, alert, cooperative, mild apparent distress bad HA   Lungs: Clear to auscultation bilaterally, no crackles or wheezing   Cardiovascular: Regular rate and rhythm, normal S1 and S2, and no murmur noted   Abdomen: Normal bowel sounds, soft, non-distended, some flank tender   Skin: No rashes, no cyanosis, no edema   Other:           Data:   All microbiology laboratory data reviewed.  Recent Labs   Lab Test  01/26/17   0530  01/25/17   0700  01/24/17   0644   WBC  12.1*  12.3*  11.4*   HGB  10.6*  10.8*  11.8   HCT  33.6*  34.2*  37.0   MCV  90  93  89   PLT  398  " 387  402     Recent Labs   Lab Test  01/26/17   0530  01/25/17   0700  01/24/17   0644   CR  0.96  0.93  0.96     No lab results found.  Recent Labs   Lab Test  01/24/17   0622  04/10/14   1430   CULT  50,000 to 100,000 colonies/mL Proteus mirabilis*  Canceled, Test credited Duplicate request  No Salmonella, Shigella, Campylobacter, E. coli O157, Aeromonas, or Plesiomonas   isolated.

## 2017-01-27 ENCOUNTER — APPOINTMENT (OUTPATIENT)
Dept: ULTRASOUND IMAGING | Facility: CLINIC | Age: 47
DRG: 690 | End: 2017-01-27
Attending: INTERNAL MEDICINE
Payer: COMMERCIAL

## 2017-01-27 LAB
ALBUMIN SERPL-MCNC: 3.1 G/DL (ref 3.4–5)
ALP SERPL-CCNC: 76 U/L (ref 40–150)
ALT SERPL W P-5'-P-CCNC: 19 U/L (ref 0–50)
AST SERPL W P-5'-P-CCNC: 27 U/L (ref 0–45)
BILIRUB DIRECT SERPL-MCNC: 0.1 MG/DL (ref 0–0.2)
BILIRUB SERPL-MCNC: 0.4 MG/DL (ref 0.2–1.3)
LACTATE BLD-SCNC: 0.7 MMOL/L (ref 0.7–2.1)
MAGNESIUM SERPL-MCNC: 1.7 MG/DL (ref 1.6–2.3)
POTASSIUM SERPL-SCNC: 3.9 MMOL/L (ref 3.4–5.3)
PROT SERPL-MCNC: 6.3 G/DL (ref 6.8–8.8)

## 2017-01-27 PROCEDURE — 84132 ASSAY OF SERUM POTASSIUM: CPT | Performed by: INTERNAL MEDICINE

## 2017-01-27 PROCEDURE — 83735 ASSAY OF MAGNESIUM: CPT | Performed by: INTERNAL MEDICINE

## 2017-01-27 PROCEDURE — 36415 COLL VENOUS BLD VENIPUNCTURE: CPT | Performed by: INTERNAL MEDICINE

## 2017-01-27 PROCEDURE — 25000125 ZZHC RX 250: Performed by: CLINICAL NURSE SPECIALIST

## 2017-01-27 PROCEDURE — 25000132 ZZH RX MED GY IP 250 OP 250 PS 637: Performed by: HOSPITALIST

## 2017-01-27 PROCEDURE — 25000132 ZZH RX MED GY IP 250 OP 250 PS 637: Performed by: INTERNAL MEDICINE

## 2017-01-27 PROCEDURE — 25000132 ZZH RX MED GY IP 250 OP 250 PS 637: Performed by: PHYSICIAN ASSISTANT

## 2017-01-27 PROCEDURE — 12000000 ZZH R&B MED SURG/OB

## 2017-01-27 PROCEDURE — 27210995 ZZH RX 272: Performed by: PHYSICIAN ASSISTANT

## 2017-01-27 PROCEDURE — 99232 SBSQ HOSP IP/OBS MODERATE 35: CPT | Performed by: HOSPITALIST

## 2017-01-27 PROCEDURE — 25000132 ZZH RX MED GY IP 250 OP 250 PS 637: Performed by: CLINICAL NURSE SPECIALIST

## 2017-01-27 PROCEDURE — 80076 HEPATIC FUNCTION PANEL: CPT | Performed by: INTERNAL MEDICINE

## 2017-01-27 PROCEDURE — 76705 ECHO EXAM OF ABDOMEN: CPT

## 2017-01-27 PROCEDURE — 99232 SBSQ HOSP IP/OBS MODERATE 35: CPT | Performed by: CLINICAL NURSE SPECIALIST

## 2017-01-27 PROCEDURE — 83605 ASSAY OF LACTIC ACID: CPT | Performed by: INTERNAL MEDICINE

## 2017-01-27 PROCEDURE — 25000125 ZZHC RX 250: Performed by: PHYSICIAN ASSISTANT

## 2017-01-27 RX ORDER — ONDANSETRON 2 MG/ML
8 INJECTION INTRAMUSCULAR; INTRAVENOUS EVERY 8 HOURS PRN
Status: DISCONTINUED | OUTPATIENT
Start: 2017-01-27 | End: 2017-01-29 | Stop reason: HOSPADM

## 2017-01-27 RX ORDER — POLYETHYLENE GLYCOL 3350 17 G/17G
17 POWDER, FOR SOLUTION ORAL 2 TIMES DAILY PRN
Status: DISCONTINUED | OUTPATIENT
Start: 2017-01-27 | End: 2017-01-29 | Stop reason: HOSPADM

## 2017-01-27 RX ORDER — AMOXICILLIN 250 MG
1-2 CAPSULE ORAL 2 TIMES DAILY PRN
Status: DISCONTINUED | OUTPATIENT
Start: 2017-01-27 | End: 2017-01-29 | Stop reason: HOSPADM

## 2017-01-27 RX ORDER — CLONIDINE HYDROCHLORIDE 0.1 MG/1
0.2 TABLET ORAL 2 TIMES DAILY
Status: DISCONTINUED | OUTPATIENT
Start: 2017-01-27 | End: 2017-01-28

## 2017-01-27 RX ORDER — LIDOCAINE 50 MG/G
2 PATCH TOPICAL
Status: DISCONTINUED | OUTPATIENT
Start: 2017-01-27 | End: 2017-01-29 | Stop reason: HOSPADM

## 2017-01-27 RX ADMIN — PHENAZOPYRIDINE HYDROCHLORIDE 100 MG: 100 TABLET ORAL at 09:21

## 2017-01-27 RX ADMIN — PANTOPRAZOLE SODIUM 40 MG: 40 TABLET, DELAYED RELEASE ORAL at 09:21

## 2017-01-27 RX ADMIN — LEVOTHYROXINE SODIUM 50 MCG: 50 TABLET ORAL at 09:21

## 2017-01-27 RX ADMIN — MIRTAZAPINE 7.5 MG: 7.5 TABLET, FILM COATED ORAL at 22:11

## 2017-01-27 RX ADMIN — VANCOMYCIN HYDROCHLORIDE 125 MG: 100 INJECTION, POWDER, LYOPHILIZED, FOR SOLUTION INTRAVENOUS at 09:21

## 2017-01-27 RX ADMIN — CLONIDINE HYDROCHLORIDE 0.2 MG: 0.1 TABLET ORAL at 20:24

## 2017-01-27 RX ADMIN — VANCOMYCIN HYDROCHLORIDE 125 MG: 100 INJECTION, POWDER, LYOPHILIZED, FOR SOLUTION INTRAVENOUS at 20:30

## 2017-01-27 RX ADMIN — Medication 1 MG: at 10:49

## 2017-01-27 RX ADMIN — AMITRIPTYLINE HYDROCHLORIDE 10 MG: 10 TABLET, FILM COATED ORAL at 22:11

## 2017-01-27 RX ADMIN — ATROPA BELLADONNA AND OPIUM 1 SUPPOSITORY: 16.2; 3 SUPPOSITORY RECTAL at 05:49

## 2017-01-27 RX ADMIN — LITHIUM CARBONATE 450 MG: 450 TABLET ORAL at 09:21

## 2017-01-27 RX ADMIN — LISINOPRIL 10 MG: 10 TABLET ORAL at 09:21

## 2017-01-27 RX ADMIN — POLYETHYLENE GLYCOL 3350 17 G: 17 POWDER, FOR SOLUTION ORAL at 10:03

## 2017-01-27 RX ADMIN — CLONAZEPAM 2 MG: 1 TABLET ORAL at 22:11

## 2017-01-27 RX ADMIN — HYDROMORPHONE HYDROCHLORIDE 0.5 MG: 10 INJECTION, SOLUTION INTRAMUSCULAR; INTRAVENOUS; SUBCUTANEOUS at 07:40

## 2017-01-27 RX ADMIN — DESOGESTREL AND ETHINYL ESTRADIOL 1 TABLET: KIT at 09:20

## 2017-01-27 RX ADMIN — ONDANSETRON 8 MG: 2 INJECTION INTRAMUSCULAR; INTRAVENOUS at 04:00

## 2017-01-27 RX ADMIN — KETOROLAC TROMETHAMINE 30 MG: 30 INJECTION, SOLUTION INTRAMUSCULAR at 11:47

## 2017-01-27 RX ADMIN — SODIUM CHLORIDE: 4.5 INJECTION, SOLUTION INTRAVENOUS at 05:26

## 2017-01-27 RX ADMIN — PHENAZOPYRIDINE HYDROCHLORIDE 100 MG: 100 TABLET ORAL at 18:29

## 2017-01-27 RX ADMIN — CLONIDINE HYDROCHLORIDE 0.2 MG: 0.1 TABLET ORAL at 11:47

## 2017-01-27 RX ADMIN — KETOROLAC TROMETHAMINE 30 MG: 30 INJECTION, SOLUTION INTRAMUSCULAR at 20:30

## 2017-01-27 ASSESSMENT — PAIN DESCRIPTION - DESCRIPTORS
DESCRIPTORS: SPASM

## 2017-01-27 NOTE — PLAN OF CARE
Problem: Goal Outcome Summary  Goal: Goal Outcome Summary  Outcome: No Change  VSS. C/o headache/migraine - eletriptan prn given, abdominal pain - Iv dilaudid, lidoderm patch to lower abdomen, B&O for spasms given. Nausea with emesis. On scheduled Zofran and prn ativan for nausea. Had only apple juice with meds today. Keep her NPO for US in am. CT scan done, results pending. Needs stool sample to r/o c.diff. On oral vanco. Up with SBA.

## 2017-01-27 NOTE — PROGRESS NOTES
[facility]  Infectious Disease Progress Note          Assessment and Plan:   IMPRESSION:    1.  A 46-year-old female with complicated history admitted with acute urinary tract infection, increasing urinary symptoms, some mild systemic symptoms, not clear pyelonephritis, current cultures pending.    2.  History of recurrent urinary tract infections, particularly in the last several months.    3.  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    4.  Some chronic diarrhea, question irritable bowel disease component.    5.  History of interstitial cystitis, but definite urinary tract infections on top of that.    6.  History of amoxicillin, cephalosporins, azithromycin, Cipro, minocycline, sulfa allergies.    7.  With the relapsing Clostridium difficile, history of chronic vancomycin use, which was stopped due to headaches, not logically a side effect of vancomycin orally, which is not absorbed.    8.  History of depression and bipolar disorder.   9 Hx migraines, active   10 CT with LUQ lesions, ? PN comparison, not likely infection issue      RECOMMENDATIONS:    1.  Gent R organism, DC gent, despite prior perceived fosphomycin as c diff cause, it is logical choice, give 1 dose she tolerated and feels better, would give another dose tomorrow and hope that is adequate tx  2.  Would do vanco po, illogical HA is a side effect of this topical GI agent, bid preventive dosing  for 10 days  3.  How much antibiotics to do currently is unclear, it is not clear this is pyelonephritis, mostly lower urinary tract symptoms, obviously change antibiotics if the current cultures direct.    4 No fever, bad HA, feels poorly, not clear how much  "UTI driven implying simple bladder infection wo pyelo        Interval History:   No fever, cx proteus R gent, WBC 12 K, no diarrhea, C diff not yet done, BC neg. HA is better, nausea less bladder sxs better, CT noted              Medications:       sodium chloride (PF)  3 mL Intravenous Q8H     lidocaine  2 patch Transdermal Q24h    And     [START ON 1/28/2017] lidocaine   Transdermal Q24H    And     lidocaine   Transdermal Q8H     vancomycin  125 mg Oral BID     enoxaparin  40 mg Subcutaneous Q24H     phenazopyridine  100 mg Oral TID w/meals     amitriptyline (ELAVIL) tablet 10 mg  10 mg Oral At Bedtime     clonazePAM (klonoPIN) tablet 2 mg  2 mg Oral At Bedtime     lithium  450 mg Oral Daily     mirtazapine (REMERON) tablet TABS 7.5 mg  7.5 mg Oral At Bedtime     desogestrel-ethinyl estradiol  1 tablet Oral Daily     levothyroxine (SYNTHROID/LEVOTHROID) tablet 50 mcg  50 mcg Oral Daily     lisinopril (PRINIVIL/ZESTRIL) tablet 10 mg  10 mg Oral Daily     pantoprazole (PROTONIX) EC tablet 40 mg  40 mg Oral QAM AC                  Physical Exam:   Blood pressure 160/104, pulse 104, temperature 98.6  F (37  C), temperature source Oral, resp. rate 18, height 1.676 m (5' 6\"), weight 54.432 kg (120 lb), last menstrual period 01/22/2017, SpO2 96 %.  Wt Readings from Last 2 Encounters:   01/24/17 54.432 kg (120 lb)   09/12/16 57.335 kg (126 lb 6.4 oz)     Vital Signs with Ranges  Temp:  [97.9  F (36.6  C)-98.6  F (37  C)] 98.6  F (37  C)  Pulse:  [104] 104  Heart Rate:  [] 105  Resp:  [18-20] 18  BP: (160-193)/() 160/104 mmHg  SpO2:  [96 %-100 %] 96 %    Constitutional: Awake, alert, cooperative, mild apparent distress bad HA   Lungs: Clear to auscultation bilaterally, no crackles or wheezing   Cardiovascular: Regular rate and rhythm, normal S1 and S2, and no murmur noted   Abdomen: Normal bowel sounds, soft, non-distended, some flank tender   Skin: No rashes, no cyanosis, no edema   Other:           Data: "   All microbiology laboratory data reviewed.  Recent Labs   Lab Test  01/26/17   0530  01/25/17   0700  01/24/17   0644   WBC  12.1*  12.3*  11.4*   HGB  10.6*  10.8*  11.8   HCT  33.6*  34.2*  37.0   MCV  90  93  89   PLT  398  387  402     Recent Labs   Lab Test  01/26/17   0530  01/25/17   0700  01/24/17   0644   CR  0.96  0.93  0.96     No lab results found.  Recent Labs   Lab Test  01/24/17   0622  04/10/14   1430   CULT  50,000 to 100,000 colonies/mL Proteus mirabilis*  Canceled, Test credited Duplicate request  No Salmonella, Shigella, Campylobacter, E. coli O157, Aeromonas, or Plesiomonas   isolated.

## 2017-01-27 NOTE — PROGRESS NOTES
"[facility]  Pain Management Progress Note    Date of Service (when I saw the patient): 01/27/2017     Assessment and Plan  Ana Laura Wharton is a 46 year old female who was admitted on 1/24/2017.     Met with Ana Laura as she was resting in bed. She is clearly improved from when I saw her yesterday elma she is no longer having rigor and vomiting. She states \"I'm still having bladder pain\". She did not feel the B&O suppository was helpful, but thought the Dilaudid was much better than the Oxycodone. Reasonable to use oral Dilaudid now that she is able to take oral medications.     1) Acute bladder spasms are improved  History of migraines    2)  Patient with chronic migraine pain, is not on chronic opioid therapy   [place] [facility] database review:  Clonazepam 1 mg (270 tablets obtained 12/12/2016) Lorazepam 1 mg (single doses obtained x 3 December 2016) Hydrocodone/Acetaminophen 5/325 mg (30 tablets obtained 10/21/2016); Percocet 5/325 mg (15 tablets obtained 8/29/2016    0 mg Daily Morphine Equivalent  Patient has a mild opioid tolerance.     Patient's opioid use during the past day: 2 mg IV Dilaudid = 40 mg Daily Morphine Equivalent    3)  Opioid induced side-effects:  -Constipation - No stool for at least a week per patient report today  -Nausea/Vomit - Resolved  -Sedation - NO    4) Other/Related:    -Depression/anxiety - Bipolar disorder with depression and anxiety. Hospitalization 4/10/2015 for auditory hallucinations  -Disease of addiction - 3/23/2014 intentionally overdosed on clonopin with 15, 1 mg pills. Hospitalized at [facility] from 3/27/15 thru 3/31/15 after an intentional overdose of 700 mg of trazodone     PLAN:   1)  Oral Dilaudid 1 to 2 mg oral prn    2)  Opium-Belladonna 30-16.2 mg Suppository every 6 hours prn for bladder spasm    3)  Multimodal Medication Therapy  Topical: Lidoderm patches    NSAIDS: Ibuprofen 400-800  mg every 6 hours prn  Muscle Relaxants: Tizanidine 4 mg every 6 hours " prn for migraine  Adjuvants: Atarax 50 mg every 6 hours prn  Antidepressants/anxiolytics: Home medications: Amitriptyline 10 mg every evening; Clonazepam 2 mg at bedtime; Lithium  mg Daily; Remeron 7.5 mg at bedtime  Opioids:  Oral Dilaudid 1 to 2 mg oral prn and Opium-Belladonna 30-16.2 mg Suppository every 6 hours prn for bladder spasm    4)  Non-medication interventions  Heating pad or ice prn    5)  Constipation Prophylaxis  Recent history of relapsing Clostridium Difficile colitis- no stool one week  Senna-S prn; Katrin lax PRN    6) DC safety  -Opioids are not recommended for this patient for discharge given history of attempted suicide    Deisy Cervantes MS, RN, CNS, APRN, ACHPN, FAACVPR  Pain and Palliative Care  Pager [phone]  Office [phone]       Time Spent on This Encounter  I spent  20 minutes is assessment and discussion with the patient.  Another 10 minutes in review of chart, documentation and discussion with the health care team.      Interval History  Chart review    Physical Exam  Temp:  [97.9  F (36.6  C)-98.6  F (37  C)] 98.6  F (37  C)  Pulse:  [104] 104  Heart Rate:  [] 105  Resp:  [18-20] 18  BP: (160-193)/() 160/104 mmHg  SpO2:  [96 %-100 %] 96 %  120 lbs 0 oz  GEN:  Alert, oriented x 3, appears comfortable, NAD.  HEENT:  Normocephalic/atraumatic, no scleral icterus, no nasal discharge, mouth moist.  CV:  RRR, S1, S2; no murmurs or other irregularities noted.  +3 DP/PT pulses bilaterally; no edema BLE.  RESP:  Clear to auscultation bilaterally without rales/rhonchi/wheezing/retractions.  Symmetric chest rise on inhalation noted.  Normal respiratory effort.  ABD:  Rounded, soft, tender/non-distended.  +BS  SKIN:  Dry to touch, no exanthems noted in the visualized areas.    NEURO: Symmetric strength +5/5.  Sensation to touch intact all extremities.   There is no area of allodynia or hyperesthesia.  PAIN BEHAVIOR: Cooperative  Psych:  Normal affect.  Calm, cooperative,  conversant appropriately.    Medications    NaCl 100 mL/hr at 01/27/17 0526       sodium chloride (PF)  3 mL Intravenous Q8H     lidocaine  2 patch Transdermal Q24h    And     [START ON 1/28/2017] lidocaine   Transdermal Q24H    And     lidocaine   Transdermal Q8H     vancomycin  125 mg Oral BID     ondansetron  8 mg Intravenous Q6H     enoxaparin  40 mg Subcutaneous Q24H     phenazopyridine  100 mg Oral TID w/meals     amitriptyline (ELAVIL) tablet 10 mg  10 mg Oral At Bedtime     clonazePAM (klonoPIN) tablet 2 mg  2 mg Oral At Bedtime     lithium  450 mg Oral Daily     mirtazapine (REMERON) tablet TABS 7.5 mg  7.5 mg Oral At Bedtime     desogestrel-ethinyl estradiol  1 tablet Oral Daily     levothyroxine (SYNTHROID/LEVOTHROID) tablet 50 mcg  50 mcg Oral Daily     lisinopril (PRINIVIL/ZESTRIL) tablet 10 mg  10 mg Oral Daily     pantoprazole (PROTONIX) EC tablet 40 mg  40 mg Oral QAM AC       Data  Results for orders placed or performed during the hospital encounter of 01/24/17 (from the past 24 hour(s))   HCG qualitative urine   Result Value Ref Range    HCG Qual Urine Negative NEG   CT Abdomen Pelvis w Contrast    Narrative    CT ABDOMEN PELVIS WITH CONTRAST 1/26/2017 7:10 PM     HISTORY: Bladder and back pain. Vomiting for three days. Evaluate for  pyelonephritis, renal abscess. Patient has known history of  multilobulated cyst in stomach.     TECHNIQUE: Volumetric acquisition through abdomen and pelvis with  IV  contrast.  60 mL Isovue-370.  Radiation dose for this scan was reduced  using automated exposure control, adjustment of the mA and/or kV  according to patient size, or iterative reconstruction technique.    COMPARISON: None.    FINDINGS: Liver and spleen are negative. Gallbladder is distended.  Pancreas, adrenal glands and kidneys demonstrate no worrisome  findings. No hydronephrosis. No CT evidence of pyelonephritis. There  is a rounded low density mass in the left upper quadrant between the  left  adrenal gland and the stomach measuring 2.4 x 2.5 cm. This  measures slightly higher than water density. Just above this there is  a similar ovoid low-density area abutting the stomach best seen on  coronal image 26 measuring 1.2 x 2.2 cm on the coronal image. These  two lesions are indeterminate.    Uterus is present. No suspicious adnexal masses. Moderate stool in the  rectosigmoid colon. No bowel obstruction or ascites. Small rounded  hyperdense lesion measuring approximately 1.1 cm in the perineal  region probably along the lower aspect of the vagina. This is most  likely a vaginal cyst containing some proteinaceous debris such as a  Bartholin's gland cyst. No destructive bone lesions.      Impression    IMPRESSION:  1. Mildly distended gallbladder. Correlate clinically. Ultrasound or  HIDA scan could be performed for further evaluation.  2. No evidence of pyelonephritis or other acute cause of pain.  3. Two indeterminate low-density lesions in the left upper quadrant  between the stomach and left adrenal gland. These are of uncertain  etiology and significance. Comparison with any old examinations would  be helpful to document stability.      DASHA SOUZA MD   Hepatic panel   Result Value Ref Range    Bilirubin Direct 0.1 0.0 - 0.2 mg/dL    Bilirubin Total 0.4 0.2 - 1.3 mg/dL    Albumin 3.1 (L) 3.4 - 5.0 g/dL    Protein Total 6.3 (L) 6.8 - 8.8 g/dL    Alkaline Phosphatase 76 40 - 150 U/L    ALT 19 0 - 50 U/L    AST 27 0 - 45 U/L   Lactic acid level STAT   Result Value Ref Range    Lactic Acid 0.7 0.7 - 2.1 mmol/L   Potassium   Result Value Ref Range    Potassium 3.9 3.4 - 5.3 mmol/L   Magnesium   Result Value Ref Range    Magnesium 1.7 1.6 - 2.3 mg/dL   US Abdomen Limited    Narrative    ULTRASOUND ABDOMEN LIMITED  1/27/2017 6:26 AM     HISTORY: Right upper quadrant abdominal pain.     FINDINGS:  Liver is increased in echogenicity without focal lesions.  The gallbladder is normal without stones or sludge.  Common bile duct  is normal in diameter. Pancreas is normal where visualized.  Examination of the right kidney is unremarkable.      Impression    IMPRESSION:  Fatty infiltration of the liver. No gallstones or bile  duct dilatation.    KALPESH LEONARD MD

## 2017-01-27 NOTE — PLAN OF CARE
Problem: Pain, Acute (Adult)  Goal: Identify Related Risk Factors and Signs and Symptoms  Related risk factors and signs and symptoms are identified upon initiation of Human Response Clinical Practice Guideline (CPG)  Outcome: Improving  BP max 193/101, recheck 170/85 (orders for Hydralazine >180), HR slightly tachy. Pt quite lethargic until this AM d/t multiple meds on previous shift. Pt c/o 9/10 bladder spasm, treated with B&O supp x1, Lidocaine PIP. NPO @ 0000 for US, BS hypoactive, pt reports passing gas, no BM ~ 5 days. Pt denied nausea, but given scheduled Zofran. Pt up with SBA, voiding fine, alarm on. Currently down for abdominal US.

## 2017-01-27 NOTE — PROGRESS NOTES
[facility]  Hospitalist Progress Note  Laura Lo MD 01/27/2017    Reason for Stay (Diagnosis): UTI, possible pyelonephritis    Summary of Stay: Ana Laura Wharton is a 46 year old female admitted on 1/24/2017 with UTI and concern for possible pyelonephritis. Complicated past medical hx, including treatment for c.diff with fecal transplant. H/o bipolar d/o and migraines.               Assessment and Plan:      1. UTI, possible pyelonephritis - hx of interstitial cystitis with recurrent UTI complicated by c diff infections in the past. Urine culture growing 50,000-100,000 colonies of Proteus mirabilis. Given recurrent cdiff, her gastroenterolgist recommended gentamycin for UTI as less penetration of gut and less change for recurrence of c.diff.   -- Her symptoms are mostly consistent with a lower tract infection and cystitis, she reports some  urinary symptoms and frequency at baseline, although worsened now with the acute infection   -- ID consulted, urine is resistant to gentamycin, allergic to multiple other antibiotics. Discussed with Dr. Alegre, plan to try PO fosfomycin, if unable to tolerate, then need to re-evaluate other IV options  -- Will obtain a CT abdomen w/o contrast to eval for abscess, stones etc. Of note, she has a known history of cystic lesion in her stomach which was worked up previously by GI at [facility] and had EUS/biopsies etc.   -- check hcg for completeness sake    2.  Uncontrolled pain exacerbated by the cystitis and her migraines - will schedule her zofran, ativan also available for nausea, does not tolerate compazine, continue PRN IV and oral narcotics agents, pyridium. Multi-modal regimen.   - -appreciate pain team consult.     3.  Hx of c diff - first episode of cdiff 4/2016 which was initially treated with Flagyl which she was unable to tolerate.  She failed multiple courses of Vancomycin and underwent a fecal transplant 8/8/2016 via colonoscopy at  "[facility].  Last cdiff PCR 10/2016 negative.   --Has not had diarrhea while here but if she does, send stool sample for c diff however not able to have BM for days now.  -- starting PO vanco for prophylaxis     2. Hypokalemia - replaced per protocol,    3. Migraine headache - usually uses IM Toradol and/or Relpax at home. Will add IV toradol   4. HTN -unocntrolled- continue home meds, prn hydralazine and labetalol available, BP worsened due to pain . Will start in clonidine   5. GERD - continue home meds  6. Hypothyroidism - continue levothyroxine  7. Depression with anxiety, bipolar disorder and hx of suicide attempt - continue home meds    DVT Prophylaxis: given reduced mobility and ongoing need for hospitalization, will add lovenox   Code Status: Full Code  Estimated Disch Date / # of Days until Disch: tomorrow if able to tolerate PO         Interval History (Subjective):      Pt states she was able to eat few bites of pasta this AM, abd pain is improved but she does not like oral dilaudid=she thinks its a very low dose  Denies diarrhea                   Physical Exam:      Last Vital Signs:  Vital signs:  Temp: 98.6  F (37  C) Temp src: Oral BP: 149/88 mmHg Pulse: 104 Heart Rate: 100 Resp: 18 SpO2: 96 % O2 Device: None (Room air)   Height: 167.6 cm (5' 6\") Weight: 54.432 kg (120 lb)  Estimated body mass index is 19.38 kg/(m^2) as calculated from the following:    Height as of this encounter: 1.676 m (5' 6\").    Weight as of this encounter: 54.432 kg (120 lb).      Constitutional: Awake, alert, cooperative, appears uncomfortable but not acute distress   Respiratory: Clear to auscultation bilaterally, no crackles or wheezing   Cardiovascular: Tachycardic but regular rhythm, normal S1 and S2, and no murmur noted   Abdomen: Normal bowel sounds, soft, non-distended, tender to palpation in suprapubic region and mid abdomen, no guarding    Skin: No rashes, no cyanosis, dry to touch   Neuro: Alert and oriented " x3, no weakness, numbness, memory loss   Extremities: No edema, normal range of motion   Other(s):        All other systems: Negative          Medications:      All current medications were reviewed with changes reflected in problem list.         Data:      All new lab and imaging data was reviewed.   Labs:    Recent Labs  Lab 01/24/17  0622   CULT 50,000 to 100,000 colonies/mL Proteus mirabilis*  Canceled, Test credited Duplicate request       Recent Labs  Lab 01/26/17  0530   WBC 12.1*   HGB 10.6*   HCT 33.6*   MCV 90          Recent Labs  Lab 01/26/17  0530 01/25/17  0700 01/24/17  0644   WBC 12.1* 12.3* 11.4*   HGB 10.6* 10.8* 11.8   HCT 33.6* 34.2* 37.0   MCV 90 93 89    387 402       Recent Labs  Lab 01/24/17  0622   COLOR Light Yellow   APPEARANCE Cloudy   URINEGLC Negative   URINEBILI Negative   URINEKETONE Negative   SG 1.013   UBLD Small*   URINEPH 6.0   PROTEIN 30*   NITRITE Negative   LEUKEST Large*   RBCU 0   WBCU >182*      Imaging:   Recent Results (from the past 24 hour(s))   CT Abdomen Pelvis w Contrast    Narrative    CT ABDOMEN PELVIS WITH CONTRAST 1/26/2017 7:10 PM     HISTORY: Bladder and back pain. Vomiting for three days. Evaluate for  pyelonephritis, renal abscess. Patient has known history of  multilobulated cyst in stomach.     TECHNIQUE: Volumetric acquisition through abdomen and pelvis with  IV  contrast.  60 mL Isovue-370.  Radiation dose for this scan was reduced  using automated exposure control, adjustment of the mA and/or kV  according to patient size, or iterative reconstruction technique.    COMPARISON: None.    FINDINGS: Liver and spleen are negative. Gallbladder is distended.  Pancreas, adrenal glands and kidneys demonstrate no worrisome  findings. No hydronephrosis. No CT evidence of pyelonephritis. There  is a rounded low density mass in the left upper quadrant between the  left adrenal gland and the stomach measuring 2.4 x 2.5 cm. This  measures slightly higher  than water density. Just above this there is  a similar ovoid low-density area abutting the stomach best seen on  coronal image 26 measuring 1.2 x 2.2 cm on the coronal image. These  two lesions are indeterminate.    Uterus is present. No suspicious adnexal masses. Moderate stool in the  rectosigmoid colon. No bowel obstruction or ascites. Small rounded  hyperdense lesion measuring approximately 1.1 cm in the perineal  region probably along the lower aspect of the vagina. This is most  likely a vaginal cyst containing some proteinaceous debris such as a  Bartholin's gland cyst. No destructive bone lesions.      Impression    IMPRESSION:  1. Mildly distended gallbladder. Correlate clinically. Ultrasound or  HIDA scan could be performed for further evaluation.  2. No evidence of pyelonephritis or other acute cause of pain.  3. Two indeterminate low-density lesions in the left upper quadrant  between the stomach and left adrenal gland. These are of uncertain  etiology and significance. Comparison with any old examinations would  be helpful to document stability.      DASHA SOUZA MD   US Abdomen Limited    Narrative    ULTRASOUND ABDOMEN LIMITED  1/27/2017 6:26 AM     HISTORY: Right upper quadrant abdominal pain.     FINDINGS:  Liver is increased in echogenicity without focal lesions.  The gallbladder is normal without stones or sludge. Common bile duct  is normal in diameter. Pancreas is normal where visualized.  Examination of the right kidney is unremarkable.      Impression    IMPRESSION:  Fatty infiltration of the liver. No gallstones or bile  duct dilatation.    KALPESH LEONARD MD

## 2017-01-27 NOTE — PLAN OF CARE
"Problem: Goal Outcome Summary  Goal: Goal Outcome Summary  Outcome: No Change  VS /88 mmHg  Pulse 104  Temp(Src) 98.6  F (37  C) (Oral)  Resp 18  Ht 1.676 m (5' 6\")  Wt 54.432 kg (120 lb)  BMI 19.38 kg/m2  SpO2 96%  LMP 01/22/2017  Lung sounds clear  O2 room air  Bowel sounds active, no BM this shift; given miralax for constipation  Regular diet ordered, nausea and not feeling well after eating.  IVF saline locked this shift  Activity up with stand by assist  Pain significant amounts of pain   Patient/family centered care patient kept updated on plan of care  Discharge plan possibly tomorrow if able to tolerate po intake            "

## 2017-01-27 NOTE — PROGRESS NOTES
Update:  No acute catastrophic abnormalities on CT scan of abdomen, no abscess. Mild gall bladder distension. Her symptoms are mostly in lower abdomen correlating with cystitis, and do not fit with a gall bladder pathology. Nevertheless, will check LFT and RUQ US for further evaluation.

## 2017-01-27 NOTE — PROGRESS NOTES
Infection Prevention:    Enteric Precautions discontinued, standard precautions sufficient. Please contact Infection Prevention with any questions/concerns at *[phone].    Marivel Treviño, ICP

## 2017-01-28 LAB
ANION GAP SERPL CALCULATED.3IONS-SCNC: 8 MMOL/L (ref 3–14)
BUN SERPL-MCNC: 11 MG/DL (ref 7–30)
CALCIUM SERPL-MCNC: 8.7 MG/DL (ref 8.5–10.1)
CHLORIDE SERPL-SCNC: 107 MMOL/L (ref 94–109)
CO2 SERPL-SCNC: 26 MMOL/L (ref 20–32)
CREAT SERPL-MCNC: 1.36 MG/DL (ref 0.52–1.04)
ERYTHROCYTE [DISTWIDTH] IN BLOOD BY AUTOMATED COUNT: 15.8 % (ref 10–15)
GFR SERPL CREATININE-BSD FRML MDRD: 42 ML/MIN/1.7M2
GLUCOSE SERPL-MCNC: 83 MG/DL (ref 70–99)
HCT VFR BLD AUTO: 29.6 % (ref 35–47)
HGB BLD-MCNC: 9.2 G/DL (ref 11.7–15.7)
MCH RBC QN AUTO: 28.7 PG (ref 26.5–33)
MCHC RBC AUTO-ENTMCNC: 31.1 G/DL (ref 31.5–36.5)
MCV RBC AUTO: 92 FL (ref 78–100)
PLATELET # BLD AUTO: 315 10E9/L (ref 150–450)
POTASSIUM SERPL-SCNC: 3.8 MMOL/L (ref 3.4–5.3)
RBC # BLD AUTO: 3.21 10E12/L (ref 3.8–5.2)
SODIUM SERPL-SCNC: 141 MMOL/L (ref 133–144)
WBC # BLD AUTO: 8.2 10E9/L (ref 4–11)

## 2017-01-28 PROCEDURE — 25000132 ZZH RX MED GY IP 250 OP 250 PS 637: Performed by: PHYSICIAN ASSISTANT

## 2017-01-28 PROCEDURE — 25000132 ZZH RX MED GY IP 250 OP 250 PS 637: Performed by: INTERNAL MEDICINE

## 2017-01-28 PROCEDURE — 25000128 H RX IP 250 OP 636: Performed by: HOSPITALIST

## 2017-01-28 PROCEDURE — 85027 COMPLETE CBC AUTOMATED: CPT | Performed by: HOSPITALIST

## 2017-01-28 PROCEDURE — 25000125 ZZHC RX 250: Performed by: INTERNAL MEDICINE

## 2017-01-28 PROCEDURE — 36415 COLL VENOUS BLD VENIPUNCTURE: CPT | Performed by: HOSPITALIST

## 2017-01-28 PROCEDURE — 25000125 ZZHC RX 250: Performed by: PHYSICIAN ASSISTANT

## 2017-01-28 PROCEDURE — 80048 BASIC METABOLIC PNL TOTAL CA: CPT | Performed by: HOSPITALIST

## 2017-01-28 PROCEDURE — 25000125 ZZHC RX 250: Performed by: HOSPITALIST

## 2017-01-28 PROCEDURE — 12000000 ZZH R&B MED SURG/OB

## 2017-01-28 RX ORDER — KETOROLAC TROMETHAMINE 15 MG/ML
15 INJECTION, SOLUTION INTRAMUSCULAR; INTRAVENOUS EVERY 6 HOURS PRN
Status: DISCONTINUED | OUTPATIENT
Start: 2017-01-28 | End: 2017-01-29 | Stop reason: HOSPADM

## 2017-01-28 RX ORDER — SODIUM CHLORIDE 9 MG/ML
INJECTION, SOLUTION INTRAVENOUS CONTINUOUS
Status: DISCONTINUED | OUTPATIENT
Start: 2017-01-28 | End: 2017-01-29 | Stop reason: HOSPADM

## 2017-01-28 RX ORDER — GRANULES FOR ORAL 3 G/1
3 POWDER ORAL ONCE
Status: COMPLETED | OUTPATIENT
Start: 2017-01-28 | End: 2017-01-28

## 2017-01-28 RX ORDER — CLONIDINE HYDROCHLORIDE 0.1 MG/1
0.1 TABLET ORAL 2 TIMES DAILY
Status: DISCONTINUED | OUTPATIENT
Start: 2017-01-28 | End: 2017-01-28

## 2017-01-28 RX ADMIN — SODIUM CHLORIDE: 9 INJECTION, SOLUTION INTRAVENOUS at 11:57

## 2017-01-28 RX ADMIN — CLONAZEPAM 2 MG: 1 TABLET ORAL at 21:06

## 2017-01-28 RX ADMIN — KETOROLAC TROMETHAMINE 30 MG: 30 INJECTION, SOLUTION INTRAMUSCULAR at 11:58

## 2017-01-28 RX ADMIN — MIRTAZAPINE 7.5 MG: 7.5 TABLET, FILM COATED ORAL at 21:06

## 2017-01-28 RX ADMIN — TIZANIDINE 4 MG: 4 TABLET ORAL at 16:12

## 2017-01-28 RX ADMIN — AMITRIPTYLINE HYDROCHLORIDE 10 MG: 10 TABLET, FILM COATED ORAL at 21:06

## 2017-01-28 RX ADMIN — VANCOMYCIN HYDROCHLORIDE 125 MG: 100 INJECTION, POWDER, LYOPHILIZED, FOR SOLUTION INTRAVENOUS at 11:18

## 2017-01-28 RX ADMIN — DESOGESTREL AND ETHINYL ESTRADIOL 1 TABLET: KIT at 11:20

## 2017-01-28 RX ADMIN — LITHIUM CARBONATE 450 MG: 450 TABLET ORAL at 11:19

## 2017-01-28 RX ADMIN — FOSFOMYCIN TROMETHAMINE 3 G: 3 POWDER ORAL at 15:58

## 2017-01-28 RX ADMIN — PANTOPRAZOLE SODIUM 40 MG: 40 TABLET, DELAYED RELEASE ORAL at 11:18

## 2017-01-28 RX ADMIN — KETOROLAC TROMETHAMINE 15 MG: 15 INJECTION, SOLUTION INTRAMUSCULAR; INTRAVENOUS at 18:20

## 2017-01-28 RX ADMIN — ENOXAPARIN SODIUM 40 MG: 40 INJECTION SUBCUTANEOUS at 21:06

## 2017-01-28 RX ADMIN — LEVOTHYROXINE SODIUM 50 MCG: 50 TABLET ORAL at 11:19

## 2017-01-28 RX ADMIN — SODIUM CHLORIDE: 9 INJECTION, SOLUTION INTRAVENOUS at 21:44

## 2017-01-28 RX ADMIN — PHENAZOPYRIDINE HYDROCHLORIDE 100 MG: 100 TABLET ORAL at 12:41

## 2017-01-28 RX ADMIN — VANCOMYCIN HYDROCHLORIDE 125 MG: 100 INJECTION, POWDER, LYOPHILIZED, FOR SOLUTION INTRAVENOUS at 21:06

## 2017-01-28 RX ADMIN — PHENAZOPYRIDINE HYDROCHLORIDE 100 MG: 100 TABLET ORAL at 17:38

## 2017-01-28 RX ADMIN — TIZANIDINE 4 MG: 4 TABLET ORAL at 08:21

## 2017-01-28 ASSESSMENT — PAIN DESCRIPTION - DESCRIPTORS
DESCRIPTORS: ACHING;CRAMPING
DESCRIPTORS: OTHER (COMMENT)

## 2017-01-28 NOTE — PLAN OF CARE
Problem: Goal Outcome Summary  Goal: Goal Outcome Summary  Outcome: Improving  VSS, bowel sounds hypoactive, denies nausea, reports some mild bladder pain - denies needing interventions. Regular diet - pt reports appetite is still poor, PIV saline locked, up with SBA. Calm and cooperative, flat affect, A/Ox4.

## 2017-01-28 NOTE — PROGRESS NOTES
[facility]  Infectious Disease Progress Note          Assessment and Plan:   IMPRESSION:    1.  A 46-year-old female with complicated history admitted with acute urinary tract infection, increasing urinary symptoms, some mild systemic symptoms, not clear pyelonephritis, current cultures pending.    2.  History of recurrent urinary tract infections, particularly in the last several months.    3.  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    4.  Some chronic diarrhea, question irritable bowel disease component.    5.  History of interstitial cystitis, but definite urinary tract infections on top of that.    6.  History of amoxicillin, cephalosporins, azithromycin, Cipro, minocycline, sulfa allergies.    7.  With the relapsing Clostridium difficile, history of chronic vancomycin use, which was stopped due to headaches, not logically a side effect of vancomycin orally, which is not absorbed.    8.  History of depression and bipolar disorder.   9 Hx migraines, active   10 CT with LUQ lesions, ? PN comparison, not likely infection issue      RECOMMENDATIONS:    1.  Gent R organism, despite prior perceived fosphomycin as c diff cause, it is logical choice, given 1 dose she tolerated and feels better, would give another dose today and hope that is adequate tx  2.  Would do vanco po, illogical HA is a side effect of this topical GI agent, bid preventive dosing  for 10 days  3.  How much antibiotics to do currently is unclear, it is not clear this is pyelonephritis, mostly lower urinary tract symptoms, obviously change antibiotics if the current cultures direct.    4 No fever, bad HA, feels poorly, not clear how much UTI driven  "implying simple bladder infection wo pyelo        Interval History:   No fever, cx proteus R gent, WBC 8 K,  no diarrhe, BC neg.                Medications:       sodium chloride (PF)  3 mL Intravenous Q8H     lidocaine  2 patch Transdermal Q24h    And     lidocaine   Transdermal Q24H    And     lidocaine   Transdermal Q8H     vancomycin  125 mg Oral BID     enoxaparin  40 mg Subcutaneous Q24H     phenazopyridine  100 mg Oral TID w/meals     amitriptyline (ELAVIL) tablet 10 mg  10 mg Oral At Bedtime     clonazePAM (klonoPIN) tablet 2 mg  2 mg Oral At Bedtime     lithium  450 mg Oral Daily     mirtazapine (REMERON) tablet TABS 7.5 mg  7.5 mg Oral At Bedtime     desogestrel-ethinyl estradiol  1 tablet Oral Daily     levothyroxine (SYNTHROID/LEVOTHROID) tablet 50 mcg  50 mcg Oral Daily     lisinopril (PRINIVIL/ZESTRIL) tablet 10 mg  10 mg Oral Daily     pantoprazole (PROTONIX) EC tablet 40 mg  40 mg Oral QAM AC                  Physical Exam:   Blood pressure 92/51, pulse 104, temperature 98  F (36.7  C), temperature source Axillary, resp. rate 16, height 1.676 m (5' 6\"), weight 54.432 kg (120 lb), last menstrual period 01/22/2017, SpO2 96 %.  Wt Readings from Last 2 Encounters:   01/24/17 54.432 kg (120 lb)   09/12/16 57.335 kg (126 lb 6.4 oz)     Vital Signs with Ranges  Temp:  [97.4  F (36.3  C)-98.6  F (37  C)] 98  F (36.7  C)  Heart Rate:  [65-92] 69  Resp:  [16-20] 16  BP: ()/(51-71) 92/51 mmHg  SpO2:  [94 %-96 %] 96 %    Constitutional: Awake, alert, cooperative   Lungs: Clear to auscultation bilaterally, no crackles or wheezing   Cardiovascular: Regular rate and rhythm, normal S1 and S2, and no murmur noted   Abdomen: Normal bowel sounds, soft, non-distended, some flank tender   Skin: No rashes, no cyanosis, no edema   Other:           Data:   All microbiology laboratory data reviewed.  Recent Labs   Lab Test  01/28/17   0704  01/26/17   0530  01/25/17   0700   WBC  8.2  12.1*  12.3*   HGB  9.2*  10.6*  " 10.8*   HCT  29.6*  33.6*  34.2*   MCV  92  90  93   PLT  315  398  387     Recent Labs   Lab Test  01/28/17   0704  01/26/17   0530  01/25/17   0700   CR  1.36*  0.96  0.93     No lab results found.  Recent Labs   Lab Test  01/24/17   0622  04/10/14   1430   CULT  50,000 to 100,000 colonies/mL Proteus mirabilis*  Canceled, Test credited Duplicate request  No Salmonella, Shigella, Campylobacter, E. coli O157, Aeromonas, or Plesiomonas   isolated.

## 2017-01-28 NOTE — PROGRESS NOTES
[facility]  Hospitalist Progress Note  Laura Lo MD 01/28/2017    Reason for Stay (Diagnosis): UTI, possible pyelonephritis    Summary of Stay: Ana Laura Wharton is a 46 year old female admitted on 1/24/2017 with UTI and concern for possible pyelonephritis. Complicated past medical hx, including treatment for c.diff with fecal transplant. H/o bipolar d/o and migraines.               Assessment and Plan:      1. UTI, possible pyelonephritis - hx of interstitial cystitis with recurrent UTI complicated by c diff infections in the past. Urine culture growing 50,000-100,000 colonies of Proteus mirabilis. Given recurrent cdiff, her gastroenterolgist recommended gentamycin for UTI as less penetration of gut and less change for recurrence of c.diff.   -- Her symptoms are mostly consistent with a lower tract infection and cystitis, she reports some  urinary symptoms and frequency at baseline, although worsened now with the acute infection   -- ID consulted, urine is resistant to gentamycin, allergic to multiple other antibiotics. Discussed with Dr. Alegre, plan to try PO fosfomycin, if unable to tolerate, then need to re-evaluate other IV options  --CT abdomen w/o contrast to eval for abscess, stones etc. Was -ve  --  hcg -ve    2.  Uncontrolled pain exacerbated by the cystitis and her migraines - will schedule her zofran, ativan also available for nausea, does not tolerate compazine, continue PRN IV and oral narcotics agents, pyridium. Multi-modal regimen.   - -appreciate pain team consult.     3.  Hx of c diff - first episode of cdiff 4/2016 which was initially treated with Flagyl which she was unable to tolerate.  She failed multiple courses of Vancomycin and underwent a fecal transplant 8/8/2016 via colonoscopy at [facility].  Last cdiff PCR 10/2016 negative.   --Has not had diarrhea while here but if she does, send stool sample for c diff however not able to have BM for days now.  --  PO  "vanco for prophylaxis     2. Hypokalemia - replaced per protocol,    3. Migraine headache - usually uses IM Toradol and/or Relpax at home. Will add IV toradol   4. HTN -unocntrolled- continue home meds, prn hydralazine and labetalol available, BP worsened due to pain . Will start in clonidine   5. GERD - continue home meds  6. Hypothyroidism - continue levothyroxine  7. Depression with anxiety, bipolar disorder and hx of suicide attempt - continue home meds    DVT Prophylaxis: given reduced mobility and ongoing need for hospitalization, will add lovenox   Code Status: Full Code  Estimated Disch Date / # of Days until Disch: tomorrow if able to tolerate PO         Interval History (Subjective):      Pt states she is feeling ok, still with abd pain-relived with toradol  Appetite poor  Encouraged to walk                   Physical Exam:      Last Vital Signs:  Vital signs:  Temp: 98  F (36.7  C) Temp src: Axillary BP: 92/51 mmHg   Heart Rate: 69 Resp: 16 SpO2: 96 % O2 Device: None (Room air)   Height: 167.6 cm (5' 6\") Weight: 54.432 kg (120 lb)  Estimated body mass index is 19.38 kg/(m^2) as calculated from the following:    Height as of this encounter: 1.676 m (5' 6\").    Weight as of this encounter: 54.432 kg (120 lb).      Constitutional: Awake, alert, cooperative, appears uncomfortable but not acute distress   Respiratory: Clear to auscultation bilaterally, no crackles or wheezing   Cardiovascular: Tachycardic but regular rhythm, normal S1 and S2, and no murmur noted   Abdomen: Normal bowel sounds, soft, non-distended, tender to palpation in suprapubic region and mid abdomen, no guarding    Skin: No rashes, no cyanosis, dry to touch   Neuro: Alert and oriented x3, no weakness, numbness, memory loss   Extremities: No edema, normal range of motion   Other(s):        All other systems: Negative          Medications:      All current medications were reviewed with changes reflected in problem list.         Data:    "   All new lab and imaging data was reviewed.   Labs:    Recent Labs  Lab 01/24/17  0622   CULT 50,000 to 100,000 colonies/mL Proteus mirabilis*  Canceled, Test credited Duplicate request       Recent Labs  Lab 01/28/17  0704   WBC 8.2   HGB 9.2*   HCT 29.6*   MCV 92          Recent Labs  Lab 01/28/17  0704 01/26/17  0530 01/25/17  0700   WBC 8.2 12.1* 12.3*   HGB 9.2* 10.6* 10.8*   HCT 29.6* 33.6* 34.2*   MCV 92 90 93    398 387       Recent Labs  Lab 01/24/17  0622   COLOR Light Yellow   APPEARANCE Cloudy   URINEGLC Negative   URINEBILI Negative   URINEKETONE Negative   SG 1.013   UBLD Small*   URINEPH 6.0   PROTEIN 30*   NITRITE Negative   LEUKEST Large*   RBCU 0   WBCU >182*      Imaging:   No results found for this or any previous visit (from the past 24 hour(s)).

## 2017-01-28 NOTE — PLAN OF CARE
"Problem: Goal Outcome Summary  Goal: Goal Outcome Summary  Outcome: Improving  VS BP 92/51 mmHg  Pulse 104  Temp(Src) 98  F (36.7  C) (Axillary)  Resp 16  Ht 1.676 m (5' 6\")  Wt 54.432 kg (120 lb)  BMI 19.38 kg/m2  SpO2 96%  LMP 01/22/2017  Lung sounds clear   O2 room air  Bowel sounds active, c/o constipation; last BM 1/22/17  limited appetite of regular diet, needs encouragement to eat  IVF Maintenance fluids infusing, restarted this afternoon  CMS intact  Activity up with stand by assistance  Pain toradol IV dose decreased d/t increased Cr, zanaflex given x1 PRN for spasm pain  Patient/family centered care patient involved in MD/RN rounding and kept updated on plan of care  Discharge plan hopeful for tomorrow if able to control pain with oral pain medications and tolerate po intake            "

## 2017-01-28 NOTE — PLAN OF CARE
Problem: Goal Outcome Summary  Goal: Goal Outcome Summary  Outcome: No Change  Pt continues to complain pain to abdomen. Toradol given. Poor appetite. No BM noted. Up with SBA to bathroom. Orange urine d/t pyridium.  Continues on fosfomycin and vanco.

## 2017-01-29 VITALS
RESPIRATION RATE: 20 BRPM | SYSTOLIC BLOOD PRESSURE: 135 MMHG | HEART RATE: 104 BPM | OXYGEN SATURATION: 95 % | DIASTOLIC BLOOD PRESSURE: 85 MMHG | HEIGHT: 66 IN | TEMPERATURE: 97 F | BODY MASS INDEX: 19.29 KG/M2 | WEIGHT: 120 LBS

## 2017-01-29 LAB
ANION GAP SERPL CALCULATED.3IONS-SCNC: 9 MMOL/L (ref 3–14)
BUN SERPL-MCNC: 12 MG/DL (ref 7–30)
CALCIUM SERPL-MCNC: 8.2 MG/DL (ref 8.5–10.1)
CHLORIDE SERPL-SCNC: 111 MMOL/L (ref 94–109)
CO2 SERPL-SCNC: 23 MMOL/L (ref 20–32)
CREAT SERPL-MCNC: 1.2 MG/DL (ref 0.52–1.04)
ERYTHROCYTE [DISTWIDTH] IN BLOOD BY AUTOMATED COUNT: 15.8 % (ref 10–15)
GFR SERPL CREATININE-BSD FRML MDRD: 48 ML/MIN/1.7M2
GLUCOSE SERPL-MCNC: 88 MG/DL (ref 70–99)
HCT VFR BLD AUTO: 27.7 % (ref 35–47)
HGB BLD-MCNC: 8.7 G/DL (ref 11.7–15.7)
MCH RBC QN AUTO: 28.9 PG (ref 26.5–33)
MCHC RBC AUTO-ENTMCNC: 31.4 G/DL (ref 31.5–36.5)
MCV RBC AUTO: 92 FL (ref 78–100)
PLATELET # BLD AUTO: 305 10E9/L (ref 150–450)
POTASSIUM SERPL-SCNC: 3.5 MMOL/L (ref 3.4–5.3)
RBC # BLD AUTO: 3.01 10E12/L (ref 3.8–5.2)
SODIUM SERPL-SCNC: 143 MMOL/L (ref 133–144)
WBC # BLD AUTO: 6.9 10E9/L (ref 4–11)

## 2017-01-29 PROCEDURE — 25000125 ZZHC RX 250: Performed by: HOSPITALIST

## 2017-01-29 PROCEDURE — 99239 HOSP IP/OBS DSCHRG MGMT >30: CPT | Performed by: HOSPITALIST

## 2017-01-29 PROCEDURE — 80048 BASIC METABOLIC PNL TOTAL CA: CPT | Performed by: HOSPITALIST

## 2017-01-29 PROCEDURE — 25000132 ZZH RX MED GY IP 250 OP 250 PS 637: Performed by: PHYSICIAN ASSISTANT

## 2017-01-29 PROCEDURE — 36415 COLL VENOUS BLD VENIPUNCTURE: CPT | Performed by: HOSPITALIST

## 2017-01-29 PROCEDURE — 25000132 ZZH RX MED GY IP 250 OP 250 PS 637: Performed by: HOSPITALIST

## 2017-01-29 PROCEDURE — 25000132 ZZH RX MED GY IP 250 OP 250 PS 637: Performed by: INTERNAL MEDICINE

## 2017-01-29 PROCEDURE — 85027 COMPLETE CBC AUTOMATED: CPT | Performed by: HOSPITALIST

## 2017-01-29 PROCEDURE — 25000128 H RX IP 250 OP 636: Performed by: HOSPITALIST

## 2017-01-29 RX ORDER — PHENAZOPYRIDINE HYDROCHLORIDE 100 MG/1
100 TABLET, FILM COATED ORAL
Qty: 15 TABLET | Refills: 0 | Status: SHIPPED | OUTPATIENT
Start: 2017-01-29 | End: 2017-02-03

## 2017-01-29 RX ADMIN — LITHIUM CARBONATE 450 MG: 450 TABLET ORAL at 08:21

## 2017-01-29 RX ADMIN — TIZANIDINE 4 MG: 4 TABLET ORAL at 00:13

## 2017-01-29 RX ADMIN — PHENAZOPYRIDINE HYDROCHLORIDE 100 MG: 100 TABLET ORAL at 08:21

## 2017-01-29 RX ADMIN — LEVOTHYROXINE SODIUM 50 MCG: 50 TABLET ORAL at 08:22

## 2017-01-29 RX ADMIN — LISINOPRIL 10 MG: 10 TABLET ORAL at 08:21

## 2017-01-29 RX ADMIN — DESOGESTREL AND ETHINYL ESTRADIOL 1 TABLET: KIT at 08:22

## 2017-01-29 RX ADMIN — TIZANIDINE 4 MG: 4 TABLET ORAL at 08:21

## 2017-01-29 RX ADMIN — PANTOPRAZOLE SODIUM 40 MG: 40 TABLET, DELAYED RELEASE ORAL at 08:22

## 2017-01-29 RX ADMIN — SODIUM CHLORIDE: 9 INJECTION, SOLUTION INTRAVENOUS at 07:19

## 2017-01-29 RX ADMIN — KETOROLAC TROMETHAMINE 15 MG: 15 INJECTION, SOLUTION INTRAMUSCULAR; INTRAVENOUS at 00:13

## 2017-01-29 RX ADMIN — VANCOMYCIN HYDROCHLORIDE 125 MG: 100 INJECTION, POWDER, LYOPHILIZED, FOR SOLUTION INTRAVENOUS at 08:22

## 2017-01-29 NOTE — PROGRESS NOTES
Patient discharged home via private car, ambulated independently off unit.  Patient verbalized and received copy of discharge instructions.  Patient received prescriptions for new medications.  Personal belongings gathered and sent with patient.  VSS. IV removed prior to discharge.

## 2017-01-29 NOTE — PROGRESS NOTES
[facility]  Infectious Disease Progress Note          Assessment and Plan:   IMPRESSION:    1.  A 46-year-old female with complicated history admitted with acute urinary tract infection, increasing urinary symptoms, some mild systemic symptoms, not clear pyelonephritis, current cultures pending.    2.  History of recurrent urinary tract infections, particularly in the last several months.    3.  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    4.  Some chronic diarrhea, question irritable bowel disease component.    5.  History of interstitial cystitis, but definite urinary tract infections on top of that.    6.  History of amoxicillin, cephalosporins, azithromycin, Cipro, minocycline, sulfa allergies.    7.  With the relapsing Clostridium difficile, history of chronic vancomycin use, which was stopped due to headaches, not logically a side effect of vancomycin orally, which is not absorbed.    8.  History of depression and bipolar disorder.   9 Hx migraines, active   10 CT with LUQ lesions, ? PN comparison, not likely infection issue      RECOMMENDATIONS:    1.  Gent R organism, despite prior perceived fosphomycin as c diff cause, it is logical choice, was given 2 doses, hopefully adequate treatment.   2.  Would do vanco po, illogical HA is a side effect of this topical GI agent, bid preventive dosing  for 10 days on discharge.   3.  How much antibiotics to do currently is unclear, it is not clear this is pyelonephritis, mostly lower urinary tract symptoms, obviously change antibiotics if the current cultures direct.    4 No fever, bad HA, feels poorly, not clear how much UTI driven implying simple bladder infection wo  "pyelo        Interval History:   No fever, cx proteus R gent, WBC 8 K,  no diarrhe, BC neg.                Medications:       sodium chloride (PF)  3 mL Intravenous Q8H     lidocaine  2 patch Transdermal Q24h    And     lidocaine   Transdermal Q24H    And     lidocaine   Transdermal Q8H     vancomycin  125 mg Oral BID     enoxaparin  40 mg Subcutaneous Q24H     phenazopyridine  100 mg Oral TID w/meals     amitriptyline (ELAVIL) tablet 10 mg  10 mg Oral At Bedtime     clonazePAM (klonoPIN) tablet 2 mg  2 mg Oral At Bedtime     lithium  450 mg Oral Daily     mirtazapine (REMERON) tablet TABS 7.5 mg  7.5 mg Oral At Bedtime     desogestrel-ethinyl estradiol  1 tablet Oral Daily     levothyroxine (SYNTHROID/LEVOTHROID) tablet 50 mcg  50 mcg Oral Daily     lisinopril (PRINIVIL/ZESTRIL) tablet 10 mg  10 mg Oral Daily     pantoprazole (PROTONIX) EC tablet 40 mg  40 mg Oral QAM AC                  Physical Exam:   Blood pressure 135/85, pulse 104, temperature 97  F (36.1  C), temperature source Oral, resp. rate 20, height 1.676 m (5' 6\"), weight 54.432 kg (120 lb), last menstrual period 01/22/2017, SpO2 95 %.  Wt Readings from Last 2 Encounters:   01/24/17 54.432 kg (120 lb)   09/12/16 57.335 kg (126 lb 6.4 oz)     Vital Signs with Ranges  Temp:  [97  F (36.1  C)-98.4  F (36.9  C)] 97  F (36.1  C)  Heart Rate:  [73-98] 98  Resp:  [16-20] 20  BP: ()/(55-85) 135/85 mmHg  SpO2:  [95 %-99 %] 95 %    Constitutional: Awake, alert, cooperative   Lungs: Clear to auscultation bilaterally, no crackles or wheezing   Cardiovascular: Regular rate and rhythm, normal S1 and S2, and no murmur noted   Abdomen: Normal bowel sounds, soft, non-distended, some flank tender   Skin: No rashes, no cyanosis, no edema   Other:           Data:   All microbiology laboratory data reviewed.  Recent Labs   Lab Test  01/29/17   0621  01/28/17   0704  01/26/17   0530   WBC  6.9  8.2  12.1*   HGB  8.7*  9.2*  10.6*   HCT  27.7*  29.6*  33.6*   MCV  92  " 92  90   PLT  305  315  398     Recent Labs   Lab Test  01/29/17   0621  01/28/17   0704  01/26/17   0530   CR  1.20*  1.36*  0.96     No lab results found.  Recent Labs   Lab Test  01/24/17   0622  04/10/14   1430   CULT  50,000 to 100,000 colonies/mL Proteus mirabilis*  Canceled, Test credited Duplicate request  No Salmonella, Shigella, Campylobacter, E. coli O157, Aeromonas, or Plesiomonas   isolated.

## 2017-01-29 NOTE — PLAN OF CARE
Problem: Goal Outcome Summary  Goal: Goal Outcome Summary  Outcome: Improving  Ambulatory Status:  Pt up SBA/ independent.  VS:  BP runs soft, VS stable  Pain:  9//10 back flank area, using pain meds and zanaflex  Resp: LS clear, on room air  Cardiac: WDL  GI:  denies nausea.  fair appetite and on regular diet.  BS hypoactive.  Passing flatus.  Last BM today, a smear.  :  Pt is using pyridium so urine is discolored  Skin:  intact  Tx:  Pain management/abx  Labs:  Na 141, K 3.8, creat 1.36, Hgb 9.2  Consults:  ID/pain team  Disposition:  TBD- plan is tomorrow if she tolerates diet. Will continue to monitor.

## 2017-01-29 NOTE — PLAN OF CARE
End of Shift Summary.  For vital signs and complete assessments, please see documentation flowsheets.     Pertinent assessments:  Pt alert and oriented, up with standby assist.   Major Shift Events: toradol and zanaflex at midnight.   Plan (Upcoming Events): continue current cares.  Discharge/Transfer Needs: possible discharge today.    Bedside Shift Report Completed   Bedside Safety Check Completed

## 2017-01-29 NOTE — DISCHARGE SUMMARY
[facility]  Discharge Summary  Name: Ana Laura Wharton    MRN: [id number]  YOB: 1970    Age: 46 year old  Date of Discharge:  1/29/2017  1:47 PM  Date of Admission: 1/24/2017  Primary Care Provider: Robert Meza MD  Discharge Physician:  Laura Lo MD  Discharging Service:  Hospitalist      Discharge Diagnosis:  Acute cystitis, possible pyelonephritis-treated with fosfomycin per ID recs  H/o C diff colitis-dc with PO vanco x10 days for ppx  Uncontrolled pain due to cystitis   HA d/t migraines   HTN  GERD  Hypothyroidism  Depression and anxiety        Discharge Disposition:  Discharged to home     History of Illness:  See detailed admission note for full details.    Hospital Course:  Ana Laura Wharton is a 46 year old female admitted on 1/24/2017 with UTI and concern for possible pyelonephritis. Complicated past medical hx, including treatment for c.diff requiring fecal transplant. H/o bipolar d/o and migraines    UTI, possible pyelonephritis - hx of interstitial cystitis with recurrent UTI complicated by c diff infections in the past. Urine culture growing 50,000-100,000 colonies of Proteus mirabilis. Given recurrent cdiff, her gastroenterolgist recommended gentamycin for UTI as less penetration of gut and less chance for recurrence of c.diff. However urine cx was resistant to gentamycin, pt is allergic to multiple other antibiotics. ID consulted and recommended trial of PO fosfomycin   Pt improved with fosfomycin and completed treatment here  She did have lot of nausea and vomiting so CT abdomen w/o contrast was done to eval for abscess, stones etc. Was -ve, she improved with treatment of UTI  --  hcg was also -ve     Uncontrolled pain exacerbated by the cystitis and her migraines - improved with treatment of UTI. Did require narcotics here which were weaned prior to dc. She was not provided with any prescription     Hx of c diff - first episode of cdiff 4/2016 which was  "initially treated with Flagyl which she was unable to tolerate.  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  Last cdiff PCR 10/2016 negative.    --Has not had diarrhea while here but if she does, send stool sample for c diff however not able to have BM for days now.  --  PO vanco for prophylaxis to be continued 10 days after dc    Migraine headache - usually uses IM Toradol and/or Relpax at home. Will add IV toradol    HTN -unocntrolled- continue home meds, prn hydralazine and labetalol available, BP worsened due to pain . Improved with pain control   GERD - continue home meds  Hypothyroidism - continue levothyroxine  Depression with anxiety, bipolar disorder and hx of suicide attempt - continue home meds       Physical Exam Findings:  Blood pressure 135/85, pulse 104, temperature 97  F (36.1  C), temperature source Oral, resp. rate 20, height 1.676 m (5' 6\"), weight 54.432 kg (120 lb), last menstrual period 01/22/2017, SpO2 95 %.  Wt Readings from Last 1 Encounters:   01/24/17 54.432 kg (120 lb)     Constitutional:  Awake, alert, cooperative, appears uncomfortable but not acute distress    Respiratory:  Clear to auscultation bilaterally, no crackles or wheezing    Cardiovascular:  Tachycardic but regular rhythm, normal S1 and S2, and no murmur noted    Abdomen:  Normal bowel sounds, soft, non-distended, tender to palpation in suprapubic region and mid abdomen, no guarding     Skin:  No rashes, no cyanosis, dry to touch    Neuro:  Alert and oriented x3, no weakness, numbness, memory loss    Extremities:  No edema, normal range of motion    Other(s):            All other systems:  Negative                  Allergies:  Allergies   Allergen Reactions     Amoxicillin-Pot Clavulanate      PN: LW Reaction: Itching, Pruritis     Azithromycin      Other reaction(s): Dermatitis     Cephalexin      PN: LW Reaction: Itching, Pruritis     " "Ciprofloxacin Other (See Comments)     Joint pain cdiff     Compazine [Prochlorperazine] Other (See Comments)     dystonia     Metoclopramide Other (See Comments)     \"I feel like I am crawling out of my skin\"     Minocycline Nausea and Vomiting     PN: DIARRHEA, VOMITING, AND STOMACH CRAMPS     Penicillins Itching     Reglan [Metoclopramide Hcl] Other (See Comments)     Sensation of \"crawling out of skin\"     Restoril [Temazepam]      Thorazine [Chlorpromazine] Other (See Comments)     dystonia     Abilify [Aripiprazole] Rash     Lamotrigine Rash     Severe drug rash - contraindication to receiving again. Skin peeling.      Sulfa Drugs Rash        Discharge Medications:   Discharge Medication List as of 1/29/2017 12:53 PM      START taking these medications    Details   vancomycin (VANOCIN) 50 mg/mL solution Take 2.5 mLs (125 mg) by mouth 2 times daily for 10 days, Disp-50 mL, R-0, Local Print      phenazopyridine (PYRIDIUM) 100 MG tablet Take 1 tablet (100 mg) by mouth 3 times daily (with meals) for 5 days, Disp-15 tablet, R-0, Local Print         CONTINUE these medications which have NOT CHANGED    Details   LISINOPRIL PO Take 10 mg by mouth daily, Historical      AMITRIPTYLINE HCL PO Take 10 mg by mouth At Bedtime, Historical      !! CLONAZEPAM PO Take 1 mg by mouth every morning, Historical      !! CLONAZEPAM PO Take 2 mg by mouth At Bedtime, Historical      Eletriptan Hydrobromide (RELPAX PO) Take 20 mg by mouth at onset of headache for migraine (May repeat in 2 hours if needed), Historical      HYDROXYZINE HCL PO Take 100 mg by mouth daily as needed, Historical      IBUPROFEN PO Take 400-600 mg by mouth every 6 hours as needed for moderate pain, Historical      Levothyroxine Sodium (SYNTHROID PO) Take 50 mcg by mouth daily, Historical      MIRTAZAPINE PO Take 7.5 mg by mouth At Bedtime, Historical      desogestrel-ethinyl estradiol (APRI) 0.15-30 MG-MCG per tablet Take 1 tablet by mouth daily, Historical    " "  triamcinolone (KENALOG) 0.1 % cream Apply topically 2 times daily as needed for irritationHistorical      Ondansetron (ZOFRAN ODT PO) Take 4 mg by mouth every 8 hours as needed for nausea, Historical      clindamycin (CLEOCIN T) 1 % lotion Apply topically every morningHistorical      TIZANIDINE HCL PO Take 4 mg by mouth At Bedtime, Historical      lithium (ESKALITH) 450 MG CR tablet Take 1 tablet (450 mg) by mouth daily, Disp-90 tablet, R-0, E-Prescribe      Pantoprazole Sodium (PROTONIX PO) Take 40 mg by mouth every morning (before breakfast) , Historical      Ketorolac Tromethamine (TORADOL IM) Inject 30 mg into the muscle daily as needed Per pt for migraines, Historical      Acetaminophen (TYLENOL 8 HOUR PO) Take 1,000 mg by mouth every 6 hours as needed (pain) , Historical       !! - Potential duplicate medications found. Please discuss with provider.           Condition on Discharge:  Discharge condition: Stable   Discharge vitals: Blood pressure 135/85, pulse 104, temperature 97  F (36.1  C), temperature source Oral, resp. rate 20, height 1.676 m (5' 6\"), weight 54.432 kg (120 lb), last menstrual period 01/22/2017, SpO2 95 %.   Code status on discharge: Full Code       Procedures other than Imaging:  None      Imaging:  Results for orders placed or performed during the hospital encounter of 01/24/17   CT Abdomen Pelvis w Contrast    Narrative    CT ABDOMEN PELVIS WITH CONTRAST 1/26/2017 7:10 PM     HISTORY: Bladder and back pain. Vomiting for three days. Evaluate for  pyelonephritis, renal abscess. Patient has known history of  multilobulated cyst in stomach.     TECHNIQUE: Volumetric acquisition through abdomen and pelvis with  IV  contrast.  60 mL Isovue-370.  Radiation dose for this scan was reduced  using automated exposure control, adjustment of the mA and/or kV  according to patient size, or iterative reconstruction technique.    COMPARISON: None.    FINDINGS: Liver and spleen are negative. Gallbladder " is distended.  Pancreas, adrenal glands and kidneys demonstrate no worrisome  findings. No hydronephrosis. No CT evidence of pyelonephritis. There  is a rounded low density mass in the left upper quadrant between the  left adrenal gland and the stomach measuring 2.4 x 2.5 cm. This  measures slightly higher than water density. Just above this there is  a similar ovoid low-density area abutting the stomach best seen on  coronal image 26 measuring 1.2 x 2.2 cm on the coronal image. These  two lesions are indeterminate.    Uterus is present. No suspicious adnexal masses. Moderate stool in the  rectosigmoid colon. No bowel obstruction or ascites. Small rounded  hyperdense lesion measuring approximately 1.1 cm in the perineal  region probably along the lower aspect of the vagina. This is most  likely a vaginal cyst containing some proteinaceous debris such as a  Bartholin's gland cyst. No destructive bone lesions.      Impression    IMPRESSION:  1. Mildly distended gallbladder. Correlate clinically. Ultrasound or  HIDA scan could be performed for further evaluation.  2. No evidence of pyelonephritis or other acute cause of pain.  3. Two indeterminate low-density lesions in the left upper quadrant  between the stomach and left adrenal gland. These are of uncertain  etiology and significance. Comparison with any old examinations would  be helpful to document stability.      DASHA SOUZA MD   US Abdomen Limited    Narrative    ULTRASOUND ABDOMEN LIMITED  1/27/2017 6:26 AM     HISTORY: Right upper quadrant abdominal pain.     FINDINGS:  Liver is increased in echogenicity without focal lesions.  The gallbladder is normal without stones or sludge. Common bile duct  is normal in diameter. Pancreas is normal where visualized.  Examination of the right kidney is unremarkable.      Impression    IMPRESSION:  Fatty infiltration of the liver. No gallstones or bile  duct dilatation.    KALPESH LEONARD MD         Consultations:  Consultation during this admission received from infectious disease.     Recent Lab Results:    Recent Labs  Lab 01/29/17  0621 01/28/17  0704 01/26/17  0530   WBC 6.9 8.2 12.1*   HGB 8.7* 9.2* 10.6*   HCT 27.7* 29.6* 33.6*   MCV 92 92 90    315 398       Recent Labs  Lab 01/29/17  0621 01/28/17  0704 01/27/17  0105 01/26/17  0530    141  --  140   POTASSIUM 3.5 3.8 3.9 4.1   CHLORIDE 111* 107  --  107   CO2 23 26  --  24   ANIONGAP 9 8  --  9   GLC 88 83  --  99   BUN 12 11  --  7   CR 1.20* 1.36*  --  0.96   GFRESTIMATED 48* 42*  --  62   GFRESTBLACK 58* 51*  --  76   ISAURO 8.2* 8.7  --  8.8          Pending Results:    Unresulted Labs Ordered in the Past 30 Days of this Admission     No orders found from 11/26/2016 to 1/25/2017.           Discharge Instructions and Follow-Up:     Discharge Procedure Orders  Reason for your hospital stay   Order Comments: Acute cystitis w/ hematuria     Follow-up and recommended labs and tests    Order Comments: Follow up with primary care provider, Robert Meza MD, within 7 days to evaluate medication change and to evaluate after surgery.  No follow up labs or test are needed.     Activity   Order Comments: Your activity upon discharge: activity as tolerated   Order Specific Question Answer Comments   Is discharge order? Yes      Full Code     Diet   Order Comments: Follow this diet upon discharge: Orders Placed This Encounter  Regular Diet Adult   Order Specific Question Answer Comments   Is discharge order? Yes          Total time spent in face to face contact with the patient and coordinating discharge was:  45 Minutes.    Laura Lo MD  Hospitalist  [facility] [address] [place] [address]

## 2017-02-10 ENCOUNTER — OFFICE VISIT (OUTPATIENT)
Dept: PSYCHIATRY | Facility: CLINIC | Age: 47
End: 2017-02-10
Attending: PSYCHIATRY & NEUROLOGY
Payer: COMMERCIAL

## 2017-02-10 VITALS
BODY MASS INDEX: 20.22 KG/M2 | HEART RATE: 90 BPM | WEIGHT: 125.2 LBS | SYSTOLIC BLOOD PRESSURE: 78 MMHG | DIASTOLIC BLOOD PRESSURE: 49 MMHG

## 2017-02-10 DIAGNOSIS — F31.9 BIPOLAR DISORDER, UNSPECIFIED (H): Primary | ICD-10-CM

## 2017-02-10 PROCEDURE — 99212 OFFICE O/P EST SF 10 MIN: CPT | Mod: ZF

## 2017-02-10 NOTE — MR AVS SNAPSHOT
After Visit Summary   2/10/2017    Ana Laura Wharton    MRN: [id number]           Patient Information     Date Of Birth          1970        Visit Information        Provider Department      2/10/2017 2:30 PM Jaden Rodriguez MD [facility]        Today's Diagnoses     Bipolar disorder, unspecified (H)    -  1       Follow-ups after your visit        Who to contact     Please call your clinic at [phone] to:    Ask questions about your health    Make or cancel appointments    Discuss your medicines    Learn about your test results    Speak to your doctor   If you have compliments or concerns about an experience at your clinic, or if you wish to file a complaint, please contact [facility] Patient Relations at [phone] or email us at [email or web address]         Additional Information About Your Visit        MyChart Information     [employer] gives you secure access to your electronic health record. If you see a primary care provider, you can also send messages to your care team and make appointments. If you have questions, please call your primary care clinic.  If you do not have a primary care provider, please call [phone] and they will assist you.      [employer] is an electronic gateway that provides easy, online access to your medical records. With [employer], you can request a clinic appointment, read your test results, renew a prescription or communicate with your care team.     To access your existing account, please contact your [facility] or call [phone] for assistance.        Care EveryWhere ID     This is your Care EveryWhere ID. This could be used by other organizations to access your [facility] medical records  [id number]        Your Vitals Were     Pulse Last Period BMI (Body Mass Index)             90 01/22/2017 20.21 kg/m2          Blood Pressure from Last 3 Encounters:   02/10/17 (!) 78/49    01/29/17 135/85   09/12/16 135/87    Weight from Last 3 Encounters:   02/10/17 56.8 kg (125 lb 3.2 oz)   01/24/17 54.4 kg (120 lb)   09/12/16 57.3 kg (126 lb 6.4 oz)              Today, you had the following     No orders found for display       Primary Care Provider Office Phone # Fax #    Robert Meza [phone]       [place] [id number] [facility] [address]        Thank you!     Thank you for choosing [facility]  for your care. Our goal is always to provide you with excellent care. Hearing back from our patients is one way we can continue to improve our services. Please take a few minutes to complete the written survey that you may receive in the mail after your visit with us. Thank you!             Your Updated Medication List - Protect others around you: Learn how to safely use, store and throw away your medicines at www.disposemymeds.org.          This list is accurate as of: 2/10/17 11:59 PM.  Always use your most recent med list.                   Brand Name Dispense Instructions for use    AMITRIPTYLINE HCL PO      Take 10 mg by mouth At Bedtime       clindamycin 1 % lotion    CLEOCIN T     Apply topically every morning       * CLONAZEPAM PO      Take 1 mg by mouth every morning       * CLONAZEPAM PO      Take 2 mg by mouth At Bedtime       desogestrel-ethinyl estradiol 0.15-30 MG-MCG per tablet    APRI     Take 1 tablet by mouth daily       HYDROXYZINE HCL PO      Take 100 mg by mouth daily as needed       IBUPROFEN PO      Take 400-600 mg by mouth every 6 hours as needed for moderate pain       LISINOPRIL PO      Take 10 mg by mouth daily       lithium 450 MG CR tablet    ESKALITH    90 tablet    Take 1 tablet (450 mg) by mouth daily       MIRTAZAPINE PO      Take 7.5 mg by mouth At Bedtime       PROTONIX PO      Take 40 mg by mouth every morning (before breakfast)       RELPAX PO      Take 20 mg by mouth at onset of headache for migraine (May repeat  in 2 hours if needed)       SYNTHROID PO      Take 50 mcg by mouth daily       TIZANIDINE HCL PO      Take 4 mg by mouth At Bedtime       TORADOL IM      Inject 30 mg into the muscle daily as needed Per pt for migraines       triamcinolone 0.1 % cream    KENALOG     Apply topically 2 times daily as needed for irritation       TYLENOL 8 HOUR PO      Take 1,000 mg by mouth every 6 hours as needed (pain)       ZOFRAN ODT PO      Take 4 mg by mouth every 8 hours as needed for nausea       * Notice:  This list has 2 medication(s) that are the same as other medications prescribed for you. Read the directions carefully, and ask your doctor or other care provider to review them with you.

## 2017-02-14 NOTE — PROGRESS NOTES
Kee has been ill with c. diff the past several months.   She's cleared of it but is still having loose stool and incontinence.  She's had multiple UTI's.  She has marital strife.  Counseling has not been helpful.  Blood pressure meds are being adjusted.  Side effect is tremor.  Mood is sad.  Sleeping is OK since on Flexeril for neck pain.  She has no appetite.  She's able to read, and she writes 2 hours a day.  Energy is very low.  She enjoys friends.  No homicidal ideation.  Passive suicidal ideation but not daily.  Flashbacks to bad events in childhood.  No auditory or visual hallucinations.  No paranoia or ideas of reference.  Chronic anxiety.  No panic attacks.  No OCD.  Rare alcohol use and no substance use.      MENTAL STATUS EXAMINATION:  Kee is cooperative and has good grooming.   Affect is appropriate.  Movements are normal.  No psychosis.  No homicidal ideation.  Recent recall is good.  Good eye contact.  Mood is reactive.  Speech rate is normal.  Thought process is logical.  No active suicidal ideation.  Orientation x 3.      CURRENT MEDICATIONS:   1.  Remeron 7.5 mg   2. Klonopin 1 mg q am, 2 mg q hs   3. Vistaril 50 mg bid   4. Lithium 450 mg   5. Amitriptyline 10 mg   6. Zanaflex   7. Lisinopril   Last lithium level 17 = 0.80      ASSESSMENT:  Bipolar, mildly depressed      PLAN:  No medication changes today.  Dysphoria related to GI symptoms.      This was a 25 minute face-to-face encounter of which >50% of the time was spent on supportive counseling about dealing with physical illness.         PIEDAD SADLER MD             D: 2017 14:11   T: 2017 14:24   MT: LUIS      Name:     KEE TINEO   MRN:      [id number]        Account:      [id number]   :      1970           Service Date: 02/10/2017      Document: [id number]

## 2017-02-20 ENCOUNTER — TRANSFERRED RECORDS (OUTPATIENT)
Dept: HEALTH INFORMATION MANAGEMENT | Facility: CLINIC | Age: 47
End: 2017-02-20

## 2017-02-21 ENCOUNTER — TELEPHONE (OUTPATIENT)
Dept: PSYCHIATRY | Facility: CLINIC | Age: 47
End: 2017-02-21

## 2017-02-21 NOTE — TELEPHONE ENCOUNTER
Received lithium Results from [employer], drawn on 02/20/2017. Results entered into EMR. Sent to Dr. Rodriguez and KISHA Campa for review. Original fax placed in scanning on 02/21/2017 and a copy was kept in psychiatry until scanning completed/confirmed. Kyung Wilkerson LPN

## 2017-03-08 ENCOUNTER — APPOINTMENT (OUTPATIENT)
Dept: GENERAL RADIOLOGY | Facility: CLINIC | Age: 47
DRG: 885 | End: 2017-03-08
Attending: EMERGENCY MEDICINE
Payer: COMMERCIAL

## 2017-03-08 ENCOUNTER — HOSPITAL ENCOUNTER (INPATIENT)
Facility: CLINIC | Age: 47
LOS: 1 days | Discharge: ANOTHER HOSPITAL | DRG: 885 | End: 2017-03-10
Attending: EMERGENCY MEDICINE | Admitting: PSYCHIATRY & NEUROLOGY
Payer: COMMERCIAL

## 2017-03-08 DIAGNOSIS — R45.851 SUICIDAL IDEATION: ICD-10-CM

## 2017-03-08 DIAGNOSIS — E86.0 DEHYDRATION: ICD-10-CM

## 2017-03-08 DIAGNOSIS — R51.9 FACIAL PAIN: ICD-10-CM

## 2017-03-08 DIAGNOSIS — R51.9 HEADACHE, UNSPECIFIED HEADACHE TYPE: ICD-10-CM

## 2017-03-08 DIAGNOSIS — F32.A DEPRESSION, UNSPECIFIED DEPRESSION TYPE: ICD-10-CM
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
## 2017-03-08 LAB
AMPHETAMINES UR QL SCN: ABNORMAL
ANION GAP SERPL CALCULATED.3IONS-SCNC: 16 MMOL/L (ref 3–14)
APAP SERPL-MCNC: NORMAL MG/L (ref 10–20)
BARBITURATES UR QL: ABNORMAL
BASOPHILS # BLD AUTO: 0 10E9/L (ref 0–0.2)
BASOPHILS NFR BLD AUTO: 0.4 %
BENZODIAZ UR QL: ABNORMAL
BUN SERPL-MCNC: 7 MG/DL (ref 7–30)
CALCIUM SERPL-MCNC: 8.8 MG/DL (ref 8.5–10.1)
CANNABINOIDS UR QL SCN: ABNORMAL
CHLORIDE SERPL-SCNC: 104 MMOL/L (ref 94–109)
CO2 SERPL-SCNC: 22 MMOL/L (ref 20–32)
COCAINE UR QL: ABNORMAL
COHGB MFR BLD: 2.9 % (ref 0–2)
CREAT SERPL-MCNC: 0.86 MG/DL (ref 0.52–1.04)
DIFFERENTIAL METHOD BLD: ABNORMAL
EOSINOPHIL # BLD AUTO: 0 10E9/L (ref 0–0.7)
EOSINOPHIL NFR BLD AUTO: 0.3 %
ERYTHROCYTE [DISTWIDTH] IN BLOOD BY AUTOMATED COUNT: 15.7 % (ref 10–15)
ETHANOL UR QL SCN: ABNORMAL
GFR SERPL CREATININE-BSD FRML MDRD: 71 ML/MIN/1.7M2
GLUCOSE SERPL-MCNC: 95 MG/DL (ref 70–99)
HCG UR QL: NEGATIVE
HCT VFR BLD AUTO: 31.9 % (ref 35–47)
HGB BLD-MCNC: 10.3 G/DL (ref 11.7–15.7)
IMM GRANULOCYTES # BLD: 0 10E9/L (ref 0–0.4)
IMM GRANULOCYTES NFR BLD: 0.2 %
INTERNAL QC OK POCT: YES
LACTATE BLD-SCNC: 7.6 MMOL/L (ref 0.7–2.1)
LITHIUM SERPL-SCNC: <0.2 MMOL/L (ref 0.6–1.2)
LYMPHOCYTES # BLD AUTO: 1.9 10E9/L (ref 0.8–5.3)
LYMPHOCYTES NFR BLD AUTO: 20.6 %
MCH RBC QN AUTO: 29.2 PG (ref 26.5–33)
MCHC RBC AUTO-ENTMCNC: 32.3 G/DL (ref 31.5–36.5)
MCV RBC AUTO: 90 FL (ref 78–100)
MONOCYTES # BLD AUTO: 1.1 10E9/L (ref 0–1.3)
MONOCYTES NFR BLD AUTO: 11.3 %
NEUTROPHILS # BLD AUTO: 6.3 10E9/L (ref 1.6–8.3)
NEUTROPHILS NFR BLD AUTO: 67.2 %
NRBC # BLD AUTO: 0 10*3/UL
NRBC BLD AUTO-RTO: 0 /100
OPIATES UR QL SCN: ABNORMAL
PLATELET # BLD AUTO: 487 10E9/L (ref 150–450)
POTASSIUM SERPL-SCNC: 3.3 MMOL/L (ref 3.4–5.3)
RBC # BLD AUTO: 3.53 10E12/L (ref 3.8–5.2)
SALICYLATES SERPL-MCNC: NORMAL MG/DL
SODIUM SERPL-SCNC: 142 MMOL/L (ref 133–144)
WBC # BLD AUTO: 9.4 10E9/L (ref 4–11)

## 2017-03-08 PROCEDURE — 71020 XR CHEST 2 VW: CPT

## 2017-03-08 PROCEDURE — 82075 ASSAY OF BREATH ETHANOL: CPT | Performed by: EMERGENCY MEDICINE

## 2017-03-08 PROCEDURE — 25000128 H RX IP 250 OP 636: Performed by: EMERGENCY MEDICINE

## 2017-03-08 PROCEDURE — 80329 ANALGESICS NON-OPIOID 1 OR 2: CPT | Performed by: EMERGENCY MEDICINE

## 2017-03-08 PROCEDURE — 99285 EMERGENCY DEPT VISIT HI MDM: CPT | Mod: 25 | Performed by: EMERGENCY MEDICINE

## 2017-03-08 PROCEDURE — 82375 ASSAY CARBOXYHB QUANT: CPT | Performed by: EMERGENCY MEDICINE

## 2017-03-08 PROCEDURE — 80320 DRUG SCREEN QUANTALCOHOLS: CPT | Performed by: EMERGENCY MEDICINE

## 2017-03-08 PROCEDURE — 85025 COMPLETE CBC W/AUTO DIFF WBC: CPT | Performed by: EMERGENCY MEDICINE

## 2017-03-08 PROCEDURE — 99284 EMERGENCY DEPT VISIT MOD MDM: CPT | Mod: Z6 | Performed by: EMERGENCY MEDICINE

## 2017-03-08 PROCEDURE — 25000128 H RX IP 250 OP 636

## 2017-03-08 PROCEDURE — 81025 URINE PREGNANCY TEST: CPT | Performed by: EMERGENCY MEDICINE

## 2017-03-08 PROCEDURE — 80048 BASIC METABOLIC PNL TOTAL CA: CPT | Performed by: EMERGENCY MEDICINE

## 2017-03-08 PROCEDURE — 80178 ASSAY OF LITHIUM: CPT | Performed by: EMERGENCY MEDICINE

## 2017-03-08 PROCEDURE — 80307 DRUG TEST PRSMV CHEM ANLYZR: CPT | Performed by: EMERGENCY MEDICINE

## 2017-03-08 PROCEDURE — 96374 THER/PROPH/DIAG INJ IV PUSH: CPT | Performed by: EMERGENCY MEDICINE

## 2017-03-08 PROCEDURE — 96361 HYDRATE IV INFUSION ADD-ON: CPT | Performed by: EMERGENCY MEDICINE

## 2017-03-08 RX ORDER — KETOROLAC TROMETHAMINE 30 MG/ML
30 INJECTION, SOLUTION INTRAMUSCULAR; INTRAVENOUS ONCE
Status: COMPLETED | OUTPATIENT
Start: 2017-03-08 | End: 2017-03-08

## 2017-03-08 RX ORDER — ONDANSETRON 2 MG/ML
INJECTION INTRAMUSCULAR; INTRAVENOUS
Status: COMPLETED
Start: 2017-03-08 | End: 2017-03-08

## 2017-03-08 RX ADMIN — ONDANSETRON 8 MG: 2 INJECTION INTRAMUSCULAR; INTRAVENOUS at 22:06

## 2017-03-08 RX ADMIN — SODIUM CHLORIDE 1000 ML: 9 INJECTION, SOLUTION INTRAVENOUS at 22:14

## 2017-03-08 RX ADMIN — SODIUM CHLORIDE 1000 ML: 9 INJECTION, SOLUTION INTRAVENOUS at 23:52

## 2017-03-08 RX ADMIN — SODIUM CHLORIDE 1000 ML: 9 INJECTION, SOLUTION INTRAVENOUS at 21:24

## 2017-03-08 RX ADMIN — KETOROLAC TROMETHAMINE 30 MG: 30 INJECTION, SOLUTION INTRAMUSCULAR at 22:09

## 2017-03-08 ASSESSMENT — ENCOUNTER SYMPTOMS
SHORTNESS OF BREATH: 0
DIARRHEA: 0
NAUSEA: 0
VOMITING: 0
ABDOMINAL PAIN: 1
COUGH: 0

## 2017-03-09 ENCOUNTER — APPOINTMENT (OUTPATIENT)
Dept: CT IMAGING | Facility: CLINIC | Age: 47
DRG: 885 | End: 2017-03-09
Attending: EMERGENCY MEDICINE
Payer: COMMERCIAL

## 2017-03-09 VITALS
RESPIRATION RATE: 16 BRPM | DIASTOLIC BLOOD PRESSURE: 92 MMHG | SYSTOLIC BLOOD PRESSURE: 160 MMHG | HEIGHT: 65 IN | TEMPERATURE: 98.9 F | HEART RATE: 107 BPM | OXYGEN SATURATION: 98 % | WEIGHT: 116 LBS | BODY MASS INDEX: 19.33 KG/M2

## 2017-03-09 LAB
ANION GAP SERPL CALCULATED.3IONS-SCNC: 9 MMOL/L (ref 3–14)
APAP SERPL-MCNC: 4 MG/L (ref 10–20)
BUN SERPL-MCNC: 6 MG/DL (ref 7–30)
C DIFF TOX B STL QL: ABNORMAL
CALCIUM SERPL-MCNC: 7.2 MG/DL (ref 8.5–10.1)
CHLORIDE SERPL-SCNC: 113 MMOL/L (ref 94–109)
CO2 SERPL-SCNC: 25 MMOL/L (ref 20–32)
CREAT SERPL-MCNC: 0.74 MG/DL (ref 0.52–1.04)
GFR SERPL CREATININE-BSD FRML MDRD: 84 ML/MIN/1.7M2
GLUCOSE SERPL-MCNC: 97 MG/DL (ref 70–99)
LACTATE BLD-SCNC: 1.6 MMOL/L (ref 0.7–2.1)
POTASSIUM SERPL-SCNC: 3.3 MMOL/L (ref 3.4–5.3)
SALICYLATES SERPL-MCNC: NORMAL MG/DL
SODIUM SERPL-SCNC: 147 MMOL/L (ref 133–144)
SPECIMEN SOURCE: ABNORMAL

## 2017-03-09 PROCEDURE — 70450 CT HEAD/BRAIN W/O DYE: CPT

## 2017-03-09 PROCEDURE — 25000132 ZZH RX MED GY IP 250 OP 250 PS 637: Performed by: PSYCHIATRY & NEUROLOGY

## 2017-03-09 PROCEDURE — 87493 C DIFF AMPLIFIED PROBE: CPT | Performed by: PHYSICIAN ASSISTANT

## 2017-03-09 PROCEDURE — 25000132 ZZH RX MED GY IP 250 OP 250 PS 637: Performed by: EMERGENCY MEDICINE

## 2017-03-09 PROCEDURE — 80048 BASIC METABOLIC PNL TOTAL CA: CPT | Performed by: EMERGENCY MEDICINE

## 2017-03-09 PROCEDURE — 83605 ASSAY OF LACTIC ACID: CPT | Performed by: EMERGENCY MEDICINE

## 2017-03-09 PROCEDURE — 80329 ANALGESICS NON-OPIOID 1 OR 2: CPT | Performed by: EMERGENCY MEDICINE

## 2017-03-09 PROCEDURE — 99223 1ST HOSP IP/OBS HIGH 75: CPT | Mod: GC | Performed by: PSYCHIATRY & NEUROLOGY

## 2017-03-09 PROCEDURE — 25000125 ZZHC RX 250: Performed by: PSYCHIATRY & NEUROLOGY

## 2017-03-09 PROCEDURE — 99222 1ST HOSP IP/OBS MODERATE 55: CPT | Performed by: PHYSICIAN ASSISTANT

## 2017-03-09 PROCEDURE — 25000132 ZZH RX MED GY IP 250 OP 250 PS 637: Performed by: PHYSICIAN ASSISTANT

## 2017-03-09 PROCEDURE — 99207 ZZC CONSULT E&M CHANGED TO INITIAL LEVEL: CPT | Performed by: PHYSICIAN ASSISTANT

## 2017-03-09 PROCEDURE — 12400001 ZZH R&B MH UMMC

## 2017-03-09 RX ORDER — ONDANSETRON 4 MG/1
8 TABLET, ORALLY DISINTEGRATING ORAL EVERY 8 HOURS PRN
Status: DISCONTINUED | OUTPATIENT
Start: 2017-03-09 | End: 2017-03-09

## 2017-03-09 RX ORDER — TRIAMCINOLONE ACETONIDE 1 MG/G
CREAM TOPICAL 2 TIMES DAILY PRN
Status: DISCONTINUED | OUTPATIENT
Start: 2017-03-09 | End: 2017-03-10 | Stop reason: HOSPADM

## 2017-03-09 RX ORDER — ONDANSETRON 4 MG/1
4 TABLET, ORALLY DISINTEGRATING ORAL EVERY 8 HOURS PRN
Status: DISCONTINUED | OUTPATIENT
Start: 2017-03-09 | End: 2017-03-10 | Stop reason: HOSPADM

## 2017-03-09 RX ORDER — LORAZEPAM 1 MG/1
1-4 TABLET ORAL EVERY 30 MIN PRN
Status: DISCONTINUED | OUTPATIENT
Start: 2017-03-09 | End: 2017-03-10 | Stop reason: HOSPADM

## 2017-03-09 RX ORDER — MIRTAZAPINE 15 MG/1
15 TABLET, FILM COATED ORAL AT BEDTIME
Status: DISCONTINUED | OUTPATIENT
Start: 2017-03-09 | End: 2017-03-10 | Stop reason: HOSPADM

## 2017-03-09 RX ORDER — LORAZEPAM 1 MG/1
1-4 TABLET ORAL EVERY 30 MIN PRN
Status: DISCONTINUED | OUTPATIENT
Start: 2017-03-09 | End: 2017-03-09

## 2017-03-09 RX ORDER — IBUPROFEN 200 MG
400-600 TABLET ORAL EVERY 6 HOURS PRN
Status: DISCONTINUED | OUTPATIENT
Start: 2017-03-09 | End: 2017-03-09

## 2017-03-09 RX ORDER — LISINOPRIL 10 MG/1
10 TABLET ORAL ONCE
Status: COMPLETED | OUTPATIENT
Start: 2017-03-09 | End: 2017-03-09

## 2017-03-09 RX ORDER — ELETRIPTAN HYDROBROMIDE 20 MG/1
20 TABLET, FILM COATED ORAL
Status: DISCONTINUED | OUTPATIENT
Start: 2017-03-09 | End: 2017-03-10 | Stop reason: HOSPADM

## 2017-03-09 RX ORDER — LITHIUM CARBONATE 450 MG
450 TABLET, EXTENDED RELEASE ORAL DAILY
Status: DISCONTINUED | OUTPATIENT
Start: 2017-03-09 | End: 2017-03-10 | Stop reason: HOSPADM

## 2017-03-09 RX ORDER — KETOROLAC TROMETHAMINE 30 MG/ML
30 INJECTION, SOLUTION INTRAMUSCULAR; INTRAVENOUS EVERY 6 HOURS PRN
Status: DISCONTINUED | OUTPATIENT
Start: 2017-03-09 | End: 2017-03-10 | Stop reason: HOSPADM

## 2017-03-09 RX ORDER — LANOLIN ALCOHOL/MO/W.PET/CERES
100 CREAM (GRAM) TOPICAL DAILY
Status: DISCONTINUED | OUTPATIENT
Start: 2017-03-09 | End: 2017-03-10 | Stop reason: HOSPADM

## 2017-03-09 RX ORDER — AMITRIPTYLINE HYDROCHLORIDE 10 MG/1
10 TABLET ORAL AT BEDTIME
Status: DISCONTINUED | OUTPATIENT
Start: 2017-03-09 | End: 2017-03-10 | Stop reason: HOSPADM

## 2017-03-09 RX ORDER — CLONAZEPAM 1 MG/1
2 TABLET ORAL AT BEDTIME
Status: DISCONTINUED | OUTPATIENT
Start: 2017-03-09 | End: 2017-03-09

## 2017-03-09 RX ORDER — PANTOPRAZOLE SODIUM 40 MG/1
40 TABLET, DELAYED RELEASE ORAL
Status: DISCONTINUED | OUTPATIENT
Start: 2017-03-10 | End: 2017-03-10 | Stop reason: HOSPADM

## 2017-03-09 RX ORDER — DESOGESTREL AND ETHINYL ESTRADIOL 0.15-0.03
1 KIT ORAL DAILY
Status: DISCONTINUED | OUTPATIENT
Start: 2017-03-09 | End: 2017-03-10 | Stop reason: HOSPADM

## 2017-03-09 RX ORDER — CLONAZEPAM 1 MG/1
1 TABLET ORAL EVERY MORNING
Status: DISCONTINUED | OUTPATIENT
Start: 2017-03-09 | End: 2017-03-10 | Stop reason: HOSPADM

## 2017-03-09 RX ORDER — HYDROXYZINE HYDROCHLORIDE 25 MG/1
25-50 TABLET, FILM COATED ORAL EVERY 4 HOURS PRN
Status: DISCONTINUED | OUTPATIENT
Start: 2017-03-09 | End: 2017-03-10 | Stop reason: HOSPADM

## 2017-03-09 RX ORDER — LEVOTHYROXINE SODIUM 50 UG/1
50 TABLET ORAL DAILY
Status: DISCONTINUED | OUTPATIENT
Start: 2017-03-09 | End: 2017-03-10 | Stop reason: HOSPADM

## 2017-03-09 RX ORDER — ACETAMINOPHEN 325 MG/1
650 TABLET ORAL EVERY 4 HOURS PRN
Status: DISCONTINUED | OUTPATIENT
Start: 2017-03-09 | End: 2017-03-10 | Stop reason: HOSPADM

## 2017-03-09 RX ORDER — MULTIPLE VITAMINS W/ MINERALS TAB 9MG-400MCG
1 TAB ORAL DAILY
Status: DISCONTINUED | OUTPATIENT
Start: 2017-03-09 | End: 2017-03-10 | Stop reason: HOSPADM

## 2017-03-09 RX ORDER — LISINOPRIL 10 MG/1
10 TABLET ORAL DAILY
Status: DISCONTINUED | OUTPATIENT
Start: 2017-03-09 | End: 2017-03-10 | Stop reason: HOSPADM

## 2017-03-09 RX ORDER — FOLIC ACID 1 MG/1
1 TABLET ORAL DAILY
Status: DISCONTINUED | OUTPATIENT
Start: 2017-03-09 | End: 2017-03-10 | Stop reason: HOSPADM

## 2017-03-09 RX ORDER — CLONAZEPAM 1 MG/1
2 TABLET ORAL AT BEDTIME
Status: DISCONTINUED | OUTPATIENT
Start: 2017-03-09 | End: 2017-03-10 | Stop reason: HOSPADM

## 2017-03-09 RX ORDER — CLINDAMYCIN PHOSPHATE 10 UG/ML
LOTION TOPICAL EVERY MORNING
Status: DISCONTINUED | OUTPATIENT
Start: 2017-03-09 | End: 2017-03-10 | Stop reason: HOSPADM

## 2017-03-09 RX ADMIN — LEVOTHYROXINE SODIUM 50 MCG: 50 TABLET ORAL at 12:07

## 2017-03-09 RX ADMIN — HYDROXYZINE HYDROCHLORIDE 50 MG: 25 TABLET ORAL at 04:32

## 2017-03-09 RX ADMIN — LISINOPRIL 10 MG: 10 TABLET ORAL at 12:07

## 2017-03-09 RX ADMIN — Medication 20 MG: at 16:44

## 2017-03-09 RX ADMIN — MULTIPLE VITAMINS W/ MINERALS TAB 1 TABLET: TAB at 12:07

## 2017-03-09 RX ADMIN — ACETAMINOPHEN 650 MG: 325 TABLET, FILM COATED ORAL at 16:30

## 2017-03-09 RX ADMIN — LISINOPRIL 10 MG: 10 TABLET ORAL at 02:30

## 2017-03-09 RX ADMIN — LORAZEPAM 4 MG: 1 TABLET ORAL at 16:05

## 2017-03-09 RX ADMIN — FOLIC ACID 1 MG: 1 TABLET ORAL at 12:07

## 2017-03-09 RX ADMIN — CLONAZEPAM 1 MG: 1 TABLET ORAL at 12:07

## 2017-03-09 RX ADMIN — LITHIUM CARBONATE 450 MG: 450 TABLET, EXTENDED RELEASE ORAL at 12:07

## 2017-03-09 RX ADMIN — CLONAZEPAM 2 MG: 1 TABLET ORAL at 20:19

## 2017-03-09 RX ADMIN — IBUPROFEN 600 MG: 200 TABLET, FILM COATED ORAL at 04:32

## 2017-03-09 RX ADMIN — ONDANSETRON 4 MG: 4 TABLET, ORALLY DISINTEGRATING ORAL at 12:07

## 2017-03-09 RX ADMIN — Medication 100 MG: at 12:07

## 2017-03-09 ASSESSMENT — ACTIVITIES OF DAILY LIVING (ADL)
RETIRED_COMMUNICATION: 0-->UNDERSTANDS/COMMUNICATES WITHOUT DIFFICULTY
COMMUNICATION: 0-->UNDERSTANDS/COMMUNICATES WITHOUT DIFFICULTY
COGNITION: 2 - DIFFICULTY WITH ORGANIZING THOUGHTS
TOILETING: 0-->INDEPENDENT
TOILETING: 0-->INDEPENDENT
DRESS: 0-->INDEPENDENT
BATHING: 0-->INDEPENDENT
AMBULATION: 0-->INDEPENDENT
TRANSFERRING: 0-->INDEPENDENT
DRESS: STREET CLOTHES;INDEPENDENT
DRESS: 0-->INDEPENDENT
FALL_HISTORY_WITHIN_LAST_SIX_MONTHS: YES
RETIRED_EATING: 0-->INDEPENDENT
TRANSFERRING: 0-->INDEPENDENT
TOILETING: 0-->INDEPENDENT
CHANGE_IN_FUNCTIONAL_STATUS_SINCE_ONSET_OF_CURRENT_ILLNESS/INJURY: NO
AMBULATION: 0-->INDEPENDENT
BATHING: 0-->INDEPENDENT
EATING: 0-->INDEPENDENT
SWALLOWING: 0-->SWALLOWS FOODS/LIQUIDS WITHOUT DIFFICULTY
DRESS: SCRUBS (BEHAVIORAL HEALTH)
SWALLOWING: 0-->SWALLOWS FOODS/LIQUIDS WITHOUT DIFFICULTY
BATHING: 0-->INDEPENDENT
RETIRED_EATING: 0-->INDEPENDENT
AMBULATION: 0-->INDEPENDENT
GROOMING: INDEPENDENT
TRANSFERRING: 0-->INDEPENDENT
ORAL_HYGIENE: INDEPENDENT
DRESS: 0-->INDEPENDENT
ORAL_HYGIENE: INDEPENDENT
COGNITION: 0 - NO COGNITION ISSUES REPORTED
RETIRED_COMMUNICATION: 0-->UNDERSTANDS/COMMUNICATES WITHOUT DIFFICULTY
GROOMING: INDEPENDENT
SWALLOWING: 0-->SWALLOWS FOODS/LIQUIDS WITHOUT DIFFICULTY

## 2017-03-09 NOTE — PROGRESS NOTES
03/09/17 0407   Patient Belongings   Did you bring any home meds/supplements to the hospital?  Yes   Disposition of meds  Sent to security/pharmacy per site process   Patient Belongings clothing;cell phone/electronics;necklace;other (see comments)  (medications)   Disposition of Belongings locker and security   Belongings Search Yes   Clothing Search Yes   Second Staff Genie BEARD and Laureen WALLS    General Info Comment Medication: one bottle of mirtazapine 15mg; one bottle of phenazopyridine HCL; one bottle of lithium carb 450mg; one bottle of levothyroxine sodium 50mcg; one bottle of pantoprazole 40mg; one bottle of metaxalone 800mg; one bottle of hydroxyzine HCL 50mg; two bottle of isometheptene-DICHL -325 caps; one bottle of amitriptyline HCL  10mg; two bottle of sucralfate 1gm; one bottle of lisinopril 10mg; one bottle of triamcinolone Acet 0.1%; one bottle of lithium carbonate 300mg; one bottle fiber therapy 0.52g; one bottle of tramadol 50mg ; one bottle of premarin 0.3mg; one bottle of trizanidine HCL 4mg; one bottle of anti Diarrheal 2mg; two sheets of Apri 0.15/0.03mg 28days; 3 loose red pill; 2 loose white pill; one oval loose pill.  Belongings:  one cell phone; one silver necklace; one winter coat; one grey sweater; one black pant; one red bra; and one pair brown shoe.         ADMISSION:  I am responsible for any personal items that are not sent to the safe or pharmacy. [facility] is not responsible for loss, theft or damage of any property in my possession.    Patient Signature _____________________ Date/Time _____________________    Staff Signature _______________________ Date/Time _____________________    2nd Staff person, if patient is unable/unwilling to sign  ___________________________________ Date/Time _____________________    DISCHARGE:  My personal items have been returned to me.   Patient Signature _____________________ Date/Time _____________________

## 2017-03-09 NOTE — CONSULTS
"  Internal Medicine Initial Visit    Ana Laura Wharton MRN# [id number]   Age: 46 year old YOB: 1970   Date of Admission: 3/8/2017     Reason for consult: Chronic C diff, HTN, Migraines       Requesting physician Dr. Georges Griffith      SUBJECTIVE   HPI:   Ana Laura Wharton is a 46 year old female with history of recurrent c diff, HTN, GERD, hypothyroidism, migraines, depression, and bipolar disorder admitted to [facility] for suicide attempt via carbon monoxide poisoning. Medicine was consulted to evaluate patient's chronic diarrhea, HTN, and migraines.    Patient presented to the ED 3/8 following a suicide attempt via carbon monoxide poisoning. She was then medically cleared and admitted to psychiatry for further stabilization.    Patient reports a long-standing history of c diff. She becomes very tearful when discussing this, and notes it has \"worn her down\". 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 She subsequently tested negative for c diff 10/2016, although patient reports her symptoms never truly resolved. She struggles with constant diarrhea (reports ~20 BM's over past 24 hours), episodes of incontinence, and abdominal cramping. No melena or hematochezia. Reports frequent UTI's as a result of stool incontinence. Avoids eating as this appears to worsen her symptoms. Patient reports significant chest/lung pain immediately following her suicide attempt, although notes this has improved significantly. She has mild left-sided \"lung pain\", nearly resolved. No SOB. Struggles with chronic migraines, notably worse over the past few weeks. Typically starts with tylenol/ibuprofen and will use relpax/ketorolac if unresolved. Currently complains of severe headache. Patient also has had significantly elevated BP's since admission. States she has been following with her provider to get this under control. Typically runs in 190's systolic " "at home. Endorses tachycardia and a \"pounding heart\" with high blood pressures. Otherwise no fevers, chills, cough, chest pain, nausea, vomiting, dysuria, peripheral edema or rashes.    History obtained per patient and chart review.     Past Medical History:     Past Medical History   Diagnosis Date     Anxiety      Bipolar disorder (H)      C. difficile colitis      Depressive disorder      Essential hypertension 7/8/2015     Gastro-oesophageal reflux disease      Hypothyroidism 4/1/2015     Migraine      Spontaneous pneumothorax      x3, resolved w/ pleurodesis      Suicide attempt (H)       Reviewed & updated in [facility].     Past Surgical History:      Past Surgical History   Procedure Laterality Date     Thoracic surgery       for pneumothorax, pleurodesis and lobe resection     Orthopedic surgery       L shoulder     Arthroscopy knee       L knee     Cervix surgery       for pre-cancerous changes     Left knee surgery        Reviewed & updated in [facility].     Medications prior to admission:     Prior to Admission Medications   Prescriptions Last Dose Informant Patient Reported? Taking?   AMITRIPTYLINE HCL PO   Yes No   Sig: Take 10 mg by mouth At Bedtime   Acetaminophen (TYLENOL 8 HOUR PO)  Self Yes No   Sig: Take 1,000 mg by mouth every 6 hours as needed (pain)    CLONAZEPAM PO   Yes No   Sig: Take 1 mg by mouth every morning   CLONAZEPAM PO   Yes No   Sig: Take 2 mg by mouth At Bedtime   Eletriptan Hydrobromide (RELPAX PO)   Yes No   Sig: Take 20 mg by mouth at onset of headache for migraine (May repeat in 2 hours if needed)   HYDROXYZINE HCL PO   Yes No   Sig: Take 100 mg by mouth daily as needed   IBUPROFEN PO   Yes No   Sig: Take 400-600 mg by mouth every 6 hours as needed for moderate pain   Ketorolac Tromethamine (TORADOL IM)  Self Yes No   Sig: Inject 30 mg into the muscle daily as needed Per pt for migraines   LISINOPRIL PO   Yes No   Sig: Take 10 mg by mouth daily   Levothyroxine Sodium (SYNTHROID PO)   Yes " "No   Sig: Take 50 mcg by mouth daily   MIRTAZAPINE PO   Yes No   Sig: Take 7.5 mg by mouth At Bedtime   Ondansetron (ZOFRAN ODT PO)   Yes No   Sig: Take 4 mg by mouth every 8 hours as needed for nausea   Pantoprazole Sodium (PROTONIX PO)   Yes No   Sig: Take 40 mg by mouth every morning (before breakfast)    TIZANIDINE HCL PO   Yes No   Sig: Take 4 mg by mouth At Bedtime   clindamycin (CLEOCIN T) 1 % lotion   Yes No   Sig: Apply topically every morning   desogestrel-ethinyl estradiol (APRI) 0.15-30 MG-MCG per tablet   Yes No   Sig: Take 1 tablet by mouth daily   lithium (ESKALITH/LITHOBID) 300 MG CR tablet   Yes No   Sig: Take 1 tablet (300 mg) by mouth daily   triamcinolone (KENALOG) 0.1 % cream   Yes No   Sig: Apply topically 2 times daily as needed for irritation      Facility-Administered Medications: None         Allergies:     Allergies   Allergen Reactions     Amoxicillin-Pot Clavulanate      PN: LW Reaction: Itching, Pruritis     Azithromycin      Other reaction(s): Dermatitis     Cephalexin      PN: LW Reaction: Itching, Pruritis     Ciprofloxacin Other (See Comments)     Joint pain cdiff     Compazine [Prochlorperazine] Other (See Comments)     dystonia     Metoclopramide Other (See Comments)     \"I feel like I am crawling out of my skin\"     Minocycline Nausea and Vomiting     PN: DIARRHEA, VOMITING, AND STOMACH CRAMPS     Penicillins Itching     Reglan [Metoclopramide Hcl] Other (See Comments)     Sensation of \"crawling out of skin\"     Restoril [Temazepam]      Thorazine [Chlorpromazine] Other (See Comments)     dystonia     Abilify [Aripiprazole] Rash     Lamotrigine Rash     Severe drug rash - contraindication to receiving again. Skin peeling.      Sulfa Drugs Rash         Social History:   Tobacco Use: never smoker  Alcohol Use: 2-3 drinks/night - last drink yesterday  Illicit Drug Use: Previously sober since age 19, started smoking marijuana again ~3 weeks ago  STI Testing: No interest at this " "time     Family History:     Family History   Problem Relation Age of Onset     Depression Mother      Lipids Father      hyperlipidemia     Macular Degeneration Father       Reviewed & updated in [employer].     Review of Systems:   Ten point review of systems negative except as stated above in History of Present Illness.    OBJECTIVE   Physical Exam:   Blood pressure (!) 192/108, pulse 107, temperature 98.4  F (36.9  C), temperature source Oral, resp. rate 16, height 1.651 m (5' 5\"), weight 52.6 kg (116 lb), SpO2 98 %.  General:  female sitting upright in chair, very tearful. Non-toxic-appearing.  HEENT: NC/AT. Anicteric sclera. Eyes symmetric and free of discharge bilaterally. Mucous membranes moist.  Neck: Supple  Cardiovascular: Tachycardic, regular rhythm. S1/S2. No murmurs appreciated.  Lungs: Normal respiratory effort on RA. Lungs CTAB without wheezes, rales, or rhonchi.  Abdomen: Soft, non-distended. Diffuse generalized tenderness. Bowel sounds normoactive.  Extremities: Bilateral UE tremors. LE well perfused, no peripheral edema.  Skin: No rashes, jaundice, or lesions on exposed areas of skin.  Neurologic: A&O X 3. Answers questions appropriately. Moves all extremities symmetrically.     Laboratory Data:   CMP  Recent Labs  Lab 03/09/17  0036 03/08/17 2122   * 142   POTASSIUM 3.3* 3.3*   CHLORIDE 113* 104   CO2 25 22   ANIONGAP 9 16*   GLC 97 95   BUN 6* 7   CR 0.74 0.86   GFRESTIMATED 84 71   GFRESTBLACK >90African American GFR Calc 86   ISAURO 7.2* 8.8       CBC  Recent Labs  Lab 03/08/17 2122   WBC 9.4   RBC 3.53*   HGB 10.3*   HCT 31.9*   MCV 90   MCH 29.2   MCHC 32.3   RDW 15.7*   *        Assessment and Plan/Recommendations:   Ana Laura Wharton is a 46 year old female with history of recurrent c diff, HTN, GERD, hypothyroidism, migraines, depression, and bipolar disorder admitted to [facility] for suicide attempt via carbon monoxide poisoning. Medicine was consulted to evaluate " patient's chronic diarrhea, HTN, and migraines.    Depression, Bipolar Disorder with SA. Suicide attempt via carbon monoxide poisoning.  - Management per psychiatry    Chronic C Diff. Initial diagnosis 4/2016, S/p fecal transplant 8/8/2016 at [facility]. C diff PCR negative 10/2016. Pt reports persistent symptoms since diagnosis, including diarrhea, episodes of bowel incontinence, abdominal cramping. Afebrile, WBC WNL.  - C diff ordered, pending    Hypernatremia, Hypokalemia. Na initially 142 in ED, increased to 147 on recheck. Potassium 3.3. Unclear etiology, possibly 2/2 poor nutrition status. Low potassium likely 2/2 GI losses, unclear etiology of hypernatremia. Pt received 3 L IV NS bolus in ED for dehydration and lactic acidosis.  - Repeat BMP 3/10    Anemia. Hgb 10.3 on admission, BL ~ 10-11. No melena or hematochezia. Iron studies WNL 11/2014. Not on supplements PTA.  - Repeat CBC 3/10    HTN. Poorly controlled PTA. Pt reports averaging in 190's at BL.  - Continue home lisinopril 10 mg QD  - Will likely need medication adjustments, will monitor BP's overnight and re-evaluate tomorrow    Carbon Monoxide Exposure. Exposed for ~2 hours during suicide attempt. CO level 2.9%, Lactic acid 7.6, Anion gap elevated at 16 in ED. Received IVF resuscitation, subsequent AG normal (9), lactic acid of 1.6.    Chronic Migraines. Reports frequent episodes. Utilizes step-wise approach for symptomatic relief, starting with tylenol/ibuprofen, then relpax, and IM ketorolac if unresolved. Has required almost daily use of ketorolac due to migraine severity over past several weeks. Patient initiated on lithium today per psych, and known interaction with NSAIDs and lithium resulting in elevated blood lithium levels. However, pt will likely continue ketorolac upon discharge and best to monitor response while IP.  - Continue tylenol, relpax, ketorolac PRN for migraines (step-wise approach)  - Lithium dosing per  psychiatry    Hypothyroidism. On synthroid PTA. TSH 0.92 11/2016.   - Continue synthroid 50 mcg QD    GERD. No acute concerns.  - Continue PTA protonix.    Medicine will continue to follow for BMP, CBC, and C diff results. Please page the Internal Medicine job code pager for any intercurrent medical issues which arise. Thank you for the opportunity to be a part of this patient's care.    Katiuska Mendez PA-C  Hospitalist Service  [phone]      Addendum:    Patient's c diff toxin resulted as positive, and patient was transferred to internal medicine overnight. The IM consult service will sign off as patient is now being treated on the medicine floor.

## 2017-03-09 NOTE — PROGRESS NOTES
Patient's roommate is being moved out and that bed will be blocked until we have a definitive word about Ana Laura having C.Diff. or not.  Unit supervisor is aware of possible C.Diff.
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
## 2017-03-09 NOTE — PLAN OF CARE
"Problem: Depressive Symptoms  Goal: Depressive Symptoms  Signs and symptoms of listed problems will be absent or manageable.  46 year old female admitted to station 20 from the ER at [facility] at 0305 this shift.  Very tired and evasive with admission answers.  Poor eye contact also.  States she has been here at [facility] several times in the past for similar reasons.  Dx of bipolar with depression and anxiety.  Prescribed many meds but has not been taking them the past few days.  Declines to review current med list as she states she is too tired at this time.  Several med allergies listed on MAR.  Declines to talk about current stressors except \"family issues and everything\".  \"It is all in my chart\".  States prior to coming to the ER tonight, she had inhaled car fumes in her garage in a suicide attempt.  Has had suicide attempts in the past but declines to talk about when and how.  Sees Dr. Rodriguez outpatient psychiatrist on a regular basis.  Lives in [place] with her  and 14 yr old son.  On Disability due to mental hlth issues.  VS, search and snack given.  Brief orientation to unit given to patient who states understanding and declines to answer many admission questions due to \"feeling exhausted and I have been up for several hours\".  Vistaril and ibuprofen prn given for back pain.  Toradol, Zofran, and Lisinopril given in the ER before arrival on station 20. Appears to be sleeping comfortably at this time in 207-2.  Will continue to monitor and support patient.  AM labs, complete admission interview when patient more able to do so.  Complete med list review with patient. Please see ER notes for more complete information regarding patient.      "
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
## 2017-03-09 NOTE — PROGRESS NOTES
Ana Laura came to the  at 0625 stating that she has been hearing voices and hearing music that is not really there.  Seeing flashing lights and blurry images.  States these things have been occurring for the past three days.
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
## 2017-03-09 NOTE — ED NOTES
Patient to Xray with security and nursing assistant.  MD notified.  MD requested transport to radiology without O2 or telemetry.

## 2017-03-09 NOTE — ED NOTES
Pt.  transported to [address] with psych associated. Pt. calm and cooperative at this time.

## 2017-03-10 ENCOUNTER — HOSPITAL ENCOUNTER (INPATIENT)
Facility: CLINIC | Age: 47
LOS: 4 days | Discharge: HOME | DRG: 372 | End: 2017-03-14
Attending: INTERNAL MEDICINE | Admitting: INTERNAL MEDICINE
Payer: COMMERCIAL

## 2017-03-10 DIAGNOSIS — A04.72 CLOSTRIDIUM DIFFICILE COLITIS: ICD-10-CM

## 2017-03-10 DIAGNOSIS — I10 ESSENTIAL HYPERTENSION: Primary | ICD-10-CM

## 2017-03-10 LAB
ALBUMIN SERPL-MCNC: 3 G/DL (ref 3.4–5)
ALP SERPL-CCNC: 46 U/L (ref 40–150)
ALT SERPL W P-5'-P-CCNC: 10 U/L (ref 0–50)
ANION GAP SERPL CALCULATED.3IONS-SCNC: 6 MMOL/L (ref 3–14)
AST SERPL W P-5'-P-CCNC: 21 U/L (ref 0–45)
BILIRUB SERPL-MCNC: 0.3 MG/DL (ref 0.2–1.3)
BUN SERPL-MCNC: 6 MG/DL (ref 7–30)
CALCIUM SERPL-MCNC: 7.8 MG/DL (ref 8.5–10.1)
CHLORIDE SERPL-SCNC: 110 MMOL/L (ref 94–109)
CO2 SERPL-SCNC: 30 MMOL/L (ref 20–32)
CREAT SERPL-MCNC: 0.87 MG/DL (ref 0.52–1.04)
CRP SERPL-MCNC: <2.9 MG/L (ref 0–8)
ERYTHROCYTE [DISTWIDTH] IN BLOOD BY AUTOMATED COUNT: 16.1 % (ref 10–15)
GFR SERPL CREATININE-BSD FRML MDRD: 70 ML/MIN/1.7M2
GLUCOSE SERPL-MCNC: 91 MG/DL (ref 70–99)
HCT VFR BLD AUTO: 28.8 % (ref 35–47)
HGB BLD-MCNC: 9.1 G/DL (ref 11.7–15.7)
LACTATE BLD-SCNC: 1.1 MMOL/L (ref 0.7–2.1)
MAGNESIUM SERPL-MCNC: 2 MG/DL (ref 1.6–2.3)
MCH RBC QN AUTO: 29.2 PG (ref 26.5–33)
MCHC RBC AUTO-ENTMCNC: 31.6 G/DL (ref 31.5–36.5)
MCV RBC AUTO: 92 FL (ref 78–100)
PLATELET # BLD AUTO: 344 10E9/L (ref 150–450)
POTASSIUM SERPL-SCNC: 2.8 MMOL/L (ref 3.4–5.3)
POTASSIUM SERPL-SCNC: 3.4 MMOL/L (ref 3.4–5.3)
PROT SERPL-MCNC: 5.6 G/DL (ref 6.8–8.8)
RBC # BLD AUTO: 3.12 10E12/L (ref 3.8–5.2)
SODIUM SERPL-SCNC: 146 MMOL/L (ref 133–144)
WBC # BLD AUTO: 10.1 10E9/L (ref 4–11)

## 2017-03-10 PROCEDURE — 83735 ASSAY OF MAGNESIUM: CPT | Performed by: INTERNAL MEDICINE

## 2017-03-10 PROCEDURE — 86140 C-REACTIVE PROTEIN: CPT | Performed by: INTERNAL MEDICINE

## 2017-03-10 PROCEDURE — 12000001 ZZH R&B MED SURG/OB UMMC

## 2017-03-10 PROCEDURE — 36415 COLL VENOUS BLD VENIPUNCTURE: CPT | Performed by: INTERNAL MEDICINE

## 2017-03-10 PROCEDURE — 99207 ZZC MOONLIGHTING INDICATOR: CPT | Performed by: INTERNAL MEDICINE

## 2017-03-10 PROCEDURE — 99233 SBSQ HOSP IP/OBS HIGH 50: CPT | Performed by: NURSE PRACTITIONER

## 2017-03-10 PROCEDURE — 25000132 ZZH RX MED GY IP 250 OP 250 PS 637: Performed by: INTERNAL MEDICINE

## 2017-03-10 PROCEDURE — 85027 COMPLETE CBC AUTOMATED: CPT | Performed by: INTERNAL MEDICINE

## 2017-03-10 PROCEDURE — 99207 ZZC CDG-EXAM COMPONENT: MEETS DETAILED - DOWN CODED: CPT | Performed by: PSYCHIATRY & NEUROLOGY

## 2017-03-10 PROCEDURE — 25000128 H RX IP 250 OP 636: Performed by: NURSE PRACTITIONER

## 2017-03-10 PROCEDURE — 83605 ASSAY OF LACTIC ACID: CPT | Performed by: INTERNAL MEDICINE

## 2017-03-10 PROCEDURE — 99221 1ST HOSP IP/OBS SF/LOW 40: CPT | Performed by: PSYCHIATRY & NEUROLOGY

## 2017-03-10 PROCEDURE — 80053 COMPREHEN METABOLIC PANEL: CPT | Performed by: INTERNAL MEDICINE

## 2017-03-10 PROCEDURE — 25000125 ZZHC RX 250: Performed by: INTERNAL MEDICINE

## 2017-03-10 PROCEDURE — 25000132 ZZH RX MED GY IP 250 OP 250 PS 637: Performed by: NURSE PRACTITIONER

## 2017-03-10 PROCEDURE — 25000128 H RX IP 250 OP 636: Performed by: INTERNAL MEDICINE

## 2017-03-10 PROCEDURE — A9270 NON-COVERED ITEM OR SERVICE: HCPCS | Mod: GY | Performed by: INTERNAL MEDICINE

## 2017-03-10 PROCEDURE — 84132 ASSAY OF SERUM POTASSIUM: CPT | Performed by: NURSE PRACTITIONER

## 2017-03-10 PROCEDURE — 36415 COLL VENOUS BLD VENIPUNCTURE: CPT | Performed by: NURSE PRACTITIONER

## 2017-03-10 RX ORDER — CLONAZEPAM 0.5 MG/1
2 TABLET ORAL AT BEDTIME
Status: DISCONTINUED | OUTPATIENT
Start: 2017-03-10 | End: 2017-03-14 | Stop reason: HOSPADM

## 2017-03-10 RX ORDER — CALCIUM CARBONATE 500 MG/1
500-1000 TABLET, CHEWABLE ORAL 4 TIMES DAILY PRN
Status: DISCONTINUED | OUTPATIENT
Start: 2017-03-10 | End: 2017-03-14 | Stop reason: HOSPADM

## 2017-03-10 RX ORDER — HYDROXYZINE HYDROCHLORIDE 25 MG/1
25-50 TABLET, FILM COATED ORAL EVERY 6 HOURS PRN
Status: DISCONTINUED | OUTPATIENT
Start: 2017-03-10 | End: 2017-03-14 | Stop reason: HOSPADM

## 2017-03-10 RX ORDER — HYDRALAZINE HYDROCHLORIDE 25 MG/1
25 TABLET, FILM COATED ORAL 4 TIMES DAILY PRN
Status: DISCONTINUED | OUTPATIENT
Start: 2017-03-10 | End: 2017-03-14 | Stop reason: HOSPADM

## 2017-03-10 RX ORDER — LORAZEPAM 1 MG/1
1-4 TABLET ORAL EVERY 30 MIN PRN
Status: DISCONTINUED | OUTPATIENT
Start: 2017-03-10 | End: 2017-03-14 | Stop reason: HOSPADM

## 2017-03-10 RX ORDER — POTASSIUM CHLORIDE 1.5 G/1.58G
20-40 POWDER, FOR SOLUTION ORAL
Status: DISCONTINUED | OUTPATIENT
Start: 2017-03-10 | End: 2017-03-12

## 2017-03-10 RX ORDER — KETOROLAC TROMETHAMINE 30 MG/ML
30 INJECTION, SOLUTION INTRAMUSCULAR; INTRAVENOUS ONCE
Status: COMPLETED | OUTPATIENT
Start: 2017-03-10 | End: 2017-03-10

## 2017-03-10 RX ORDER — CLONAZEPAM 0.5 MG/1
2 TABLET ORAL AT BEDTIME
Status: DISCONTINUED | OUTPATIENT
Start: 2017-03-10 | End: 2017-03-10

## 2017-03-10 RX ORDER — MAGNESIUM SULFATE HEPTAHYDRATE 40 MG/ML
4 INJECTION, SOLUTION INTRAVENOUS EVERY 4 HOURS PRN
Status: DISCONTINUED | OUTPATIENT
Start: 2017-03-10 | End: 2017-03-14 | Stop reason: HOSPADM

## 2017-03-10 RX ORDER — NALOXONE HYDROCHLORIDE 0.4 MG/ML
.1-.4 INJECTION, SOLUTION INTRAMUSCULAR; INTRAVENOUS; SUBCUTANEOUS
Status: DISCONTINUED | OUTPATIENT
Start: 2017-03-10 | End: 2017-03-14 | Stop reason: HOSPADM

## 2017-03-10 RX ORDER — PANTOPRAZOLE SODIUM 40 MG/1
40 TABLET, DELAYED RELEASE ORAL
Status: DISCONTINUED | OUTPATIENT
Start: 2017-03-10 | End: 2017-03-14 | Stop reason: HOSPADM

## 2017-03-10 RX ORDER — CLONAZEPAM 0.5 MG/1
1 TABLET ORAL EVERY MORNING
Status: DISCONTINUED | OUTPATIENT
Start: 2017-03-10 | End: 2017-03-14 | Stop reason: HOSPADM

## 2017-03-10 RX ORDER — MULTIPLE VITAMINS W/ MINERALS TAB 9MG-400MCG
1 TAB ORAL DAILY
Status: DISCONTINUED | OUTPATIENT
Start: 2017-03-10 | End: 2017-03-14 | Stop reason: HOSPADM

## 2017-03-10 RX ORDER — CYCLOBENZAPRINE HCL 10 MG
10 TABLET ORAL
Status: COMPLETED | OUTPATIENT
Start: 2017-03-10 | End: 2017-03-11

## 2017-03-10 RX ORDER — ELETRIPTAN HYDROBROMIDE 20 MG/1
20 TABLET, FILM COATED ORAL
Status: DISCONTINUED | OUTPATIENT
Start: 2017-03-10 | End: 2017-03-14 | Stop reason: HOSPADM

## 2017-03-10 RX ORDER — LISINOPRIL 5 MG/1
10 TABLET ORAL DAILY
Status: DISCONTINUED | OUTPATIENT
Start: 2017-03-10 | End: 2017-03-13

## 2017-03-10 RX ORDER — MIRTAZAPINE 15 MG/1
15 TABLET, FILM COATED ORAL AT BEDTIME
Status: DISCONTINUED | OUTPATIENT
Start: 2017-03-10 | End: 2017-03-14 | Stop reason: HOSPADM

## 2017-03-10 RX ORDER — ONDANSETRON 4 MG/1
4 TABLET, ORALLY DISINTEGRATING ORAL EVERY 6 HOURS PRN
Status: DISCONTINUED | OUTPATIENT
Start: 2017-03-10 | End: 2017-03-13

## 2017-03-10 RX ORDER — DESOGESTREL AND ETHINYL ESTRADIOL 0.15-0.03
1 KIT ORAL DAILY
Status: DISCONTINUED | OUTPATIENT
Start: 2017-03-10 | End: 2017-03-14 | Stop reason: HOSPADM

## 2017-03-10 RX ORDER — KETOROLAC TROMETHAMINE 30 MG/ML
30 INJECTION, SOLUTION INTRAMUSCULAR; INTRAVENOUS 2 TIMES DAILY PRN
Status: DISCONTINUED | OUTPATIENT
Start: 2017-03-10 | End: 2017-03-12

## 2017-03-10 RX ORDER — FOLIC ACID 1 MG/1
1 TABLET ORAL DAILY
Status: DISCONTINUED | OUTPATIENT
Start: 2017-03-10 | End: 2017-03-14 | Stop reason: HOSPADM

## 2017-03-10 RX ORDER — MIRTAZAPINE 7.5 MG/1
7.5 TABLET, FILM COATED ORAL AT BEDTIME
Status: DISCONTINUED | OUTPATIENT
Start: 2017-03-10 | End: 2017-03-10

## 2017-03-10 RX ORDER — ONDANSETRON 4 MG/1
4 TABLET, ORALLY DISINTEGRATING ORAL EVERY 8 HOURS PRN
Status: DISCONTINUED | OUTPATIENT
Start: 2017-03-10 | End: 2017-03-10

## 2017-03-10 RX ORDER — AMITRIPTYLINE HYDROCHLORIDE 10 MG/1
10 TABLET ORAL AT BEDTIME
Status: DISCONTINUED | OUTPATIENT
Start: 2017-03-10 | End: 2017-03-14 | Stop reason: HOSPADM

## 2017-03-10 RX ORDER — HYDRALAZINE HYDROCHLORIDE 20 MG/ML
10 INJECTION INTRAMUSCULAR; INTRAVENOUS EVERY 6 HOURS PRN
Status: DISCONTINUED | OUTPATIENT
Start: 2017-03-10 | End: 2017-03-14 | Stop reason: HOSPADM

## 2017-03-10 RX ORDER — ONDANSETRON 2 MG/ML
4 INJECTION INTRAMUSCULAR; INTRAVENOUS EVERY 6 HOURS PRN
Status: DISCONTINUED | OUTPATIENT
Start: 2017-03-10 | End: 2017-03-13

## 2017-03-10 RX ORDER — POTASSIUM CHLORIDE 29.8 MG/ML
20 INJECTION INTRAVENOUS
Status: DISCONTINUED | OUTPATIENT
Start: 2017-03-10 | End: 2017-03-12

## 2017-03-10 RX ORDER — LANOLIN ALCOHOL/MO/W.PET/CERES
100 CREAM (GRAM) TOPICAL DAILY
Status: COMPLETED | OUTPATIENT
Start: 2017-03-10 | End: 2017-03-12

## 2017-03-10 RX ORDER — IBUPROFEN 200 MG
400-600 TABLET ORAL EVERY 6 HOURS PRN
Status: DISCONTINUED | OUTPATIENT
Start: 2017-03-10 | End: 2017-03-10

## 2017-03-10 RX ORDER — ACETAMINOPHEN 325 MG/1
650 TABLET ORAL EVERY 4 HOURS PRN
Status: DISCONTINUED | OUTPATIENT
Start: 2017-03-10 | End: 2017-03-14 | Stop reason: HOSPADM

## 2017-03-10 RX ORDER — SODIUM CHLORIDE 9 MG/ML
INJECTION, SOLUTION INTRAVENOUS CONTINUOUS
Status: DISCONTINUED | OUTPATIENT
Start: 2017-03-10 | End: 2017-03-12

## 2017-03-10 RX ORDER — LEVOTHYROXINE SODIUM 50 UG/1
50 TABLET ORAL DAILY
Status: DISCONTINUED | OUTPATIENT
Start: 2017-03-10 | End: 2017-03-14 | Stop reason: HOSPADM

## 2017-03-10 RX ORDER — POTASSIUM CHLORIDE 750 MG/1
20-40 TABLET, EXTENDED RELEASE ORAL
Status: DISCONTINUED | OUTPATIENT
Start: 2017-03-10 | End: 2017-03-12

## 2017-03-10 RX ORDER — LITHIUM CARBONATE 450 MG
450 TABLET, EXTENDED RELEASE ORAL DAILY
Status: DISCONTINUED | OUTPATIENT
Start: 2017-03-10 | End: 2017-03-14 | Stop reason: HOSPADM

## 2017-03-10 RX ORDER — CLONAZEPAM 0.5 MG/1
2 TABLET ORAL
Status: DISCONTINUED | OUTPATIENT
Start: 2017-03-10 | End: 2017-03-10

## 2017-03-10 RX ORDER — POTASSIUM CHLORIDE 7.45 MG/ML
10 INJECTION INTRAVENOUS
Status: DISCONTINUED | OUTPATIENT
Start: 2017-03-10 | End: 2017-03-12

## 2017-03-10 RX ADMIN — LISINOPRIL 10 MG: 5 TABLET ORAL at 09:48

## 2017-03-10 RX ADMIN — VANCOMYCIN HYDROCHLORIDE 125 MG: 100 INJECTION, POWDER, LYOPHILIZED, FOR SOLUTION INTRAVENOUS at 21:15

## 2017-03-10 RX ADMIN — SODIUM CHLORIDE: 9 INJECTION, SOLUTION INTRAVENOUS at 03:20

## 2017-03-10 RX ADMIN — HYDRALAZINE HYDROCHLORIDE 10 MG: 20 INJECTION INTRAMUSCULAR; INTRAVENOUS at 03:34

## 2017-03-10 RX ADMIN — HYDROXYZINE HYDROCHLORIDE 25 MG: 25 TABLET ORAL at 09:49

## 2017-03-10 RX ADMIN — VANCOMYCIN HYDROCHLORIDE 125 MG: 100 INJECTION, POWDER, LYOPHILIZED, FOR SOLUTION INTRAVENOUS at 17:04

## 2017-03-10 RX ADMIN — CLONAZEPAM 2 MG: 0.5 TABLET ORAL at 21:14

## 2017-03-10 RX ADMIN — POTASSIUM CHLORIDE 10 MEQ: 14.9 INJECTION, SOLUTION, CONCENTRATE PARENTERAL at 04:33

## 2017-03-10 RX ADMIN — POTASSIUM CHLORIDE 10 MEQ: 14.9 INJECTION, SOLUTION, CONCENTRATE PARENTERAL at 05:39

## 2017-03-10 RX ADMIN — AMITRIPTYLINE HYDROCHLORIDE 10 MG: 10 TABLET, FILM COATED ORAL at 21:15

## 2017-03-10 RX ADMIN — LITHIUM CARBONATE 450 MG: 450 TABLET, EXTENDED RELEASE ORAL at 09:48

## 2017-03-10 RX ADMIN — LORAZEPAM 2 MG: 1 TABLET ORAL at 04:32

## 2017-03-10 RX ADMIN — LEVOTHYROXINE SODIUM 50 MCG: 50 TABLET ORAL at 09:13

## 2017-03-10 RX ADMIN — MIRTAZAPINE 15 MG: 15 TABLET, FILM COATED ORAL at 21:15

## 2017-03-10 RX ADMIN — LORAZEPAM 1 MG: 1 TABLET ORAL at 12:54

## 2017-03-10 RX ADMIN — Medication 100 MG: at 09:14

## 2017-03-10 RX ADMIN — POTASSIUM CHLORIDE 10 MEQ: 14.9 INJECTION, SOLUTION, CONCENTRATE PARENTERAL at 13:38

## 2017-03-10 RX ADMIN — KETOROLAC TROMETHAMINE 30 MG: 30 INJECTION, SOLUTION INTRAMUSCULAR at 19:56

## 2017-03-10 RX ADMIN — POTASSIUM CHLORIDE 10 MEQ: 14.9 INJECTION, SOLUTION, CONCENTRATE PARENTERAL at 12:25

## 2017-03-10 RX ADMIN — POTASSIUM CHLORIDE 10 MEQ: 14.9 INJECTION, SOLUTION, CONCENTRATE PARENTERAL at 10:56

## 2017-03-10 RX ADMIN — MULTIPLE VITAMINS W/ MINERALS TAB 1 TABLET: TAB at 09:13

## 2017-03-10 RX ADMIN — KETOROLAC TROMETHAMINE 30 MG: 30 INJECTION, SOLUTION INTRAMUSCULAR at 10:57

## 2017-03-10 RX ADMIN — ONDANSETRON 4 MG: 2 INJECTION INTRAMUSCULAR; INTRAVENOUS at 22:11

## 2017-03-10 RX ADMIN — HYDRALAZINE HYDROCHLORIDE 25 MG: 25 TABLET ORAL at 12:37

## 2017-03-10 RX ADMIN — VANCOMYCIN HYDROCHLORIDE 125 MG: 100 INJECTION, POWDER, LYOPHILIZED, FOR SOLUTION INTRAVENOUS at 12:25

## 2017-03-10 RX ADMIN — HYDROXYZINE HYDROCHLORIDE 25 MG: 25 TABLET ORAL at 11:19

## 2017-03-10 RX ADMIN — POTASSIUM CHLORIDE 10 MEQ: 14.9 INJECTION, SOLUTION, CONCENTRATE PARENTERAL at 09:00

## 2017-03-10 RX ADMIN — Medication 2 G: at 14:50

## 2017-03-10 RX ADMIN — LORAZEPAM 1 MG: 1 TABLET ORAL at 18:56

## 2017-03-10 RX ADMIN — CLONAZEPAM 1 MG: 0.5 TABLET ORAL at 09:14

## 2017-03-10 RX ADMIN — FOLIC ACID 1 MG: 1 TABLET ORAL at 09:14

## 2017-03-10 RX ADMIN — PANTOPRAZOLE SODIUM 40 MG: 40 TABLET, DELAYED RELEASE ORAL at 09:14

## 2017-03-10 RX ADMIN — DESOGESTREL AND ETHINYL ESTRADIOL 1 TABLET: KIT at 12:37

## 2017-03-10 RX ADMIN — POTASSIUM CHLORIDE 20 MEQ: 750 TABLET, FILM COATED, EXTENDED RELEASE ORAL at 22:11

## 2017-03-10 RX ADMIN — SODIUM CHLORIDE: 9 INJECTION, SOLUTION INTRAVENOUS at 18:36

## 2017-03-10 ASSESSMENT — ACTIVITIES OF DAILY LIVING (ADL)
NUMBER_OF_TIMES_PATIENT_HAS_FALLEN_WITHIN_LAST_SIX_MONTHS: 1
DRESS: 0-->INDEPENDENT
RETIRED_COMMUNICATION: 0-->UNDERSTANDS/COMMUNICATES WITHOUT DIFFICULTY
COGNITION: 0 - NO COGNITION ISSUES REPORTED
AMBULATION: 0-->INDEPENDENT
FALL_HISTORY_WITHIN_LAST_SIX_MONTHS: YES
TRANSFERRING: 0-->INDEPENDENT
RETIRED_EATING: 0-->INDEPENDENT
TOILETING: 0-->INDEPENDENT
SWALLOWING: 0-->SWALLOWS FOODS/LIQUIDS WITHOUT DIFFICULTY
BATHING: 0-->INDEPENDENT

## 2017-03-10 NOTE — PROGRESS NOTES
Ana Laura was transferred to  at 0130 this shift via [facility]. Psych associate and  accompanying during transfer.  All Ana Laura's belongings from station 20 were sent along with her to . Transfer went smoothly with no concerns.

## 2017-03-10 NOTE — PROGRESS NOTES
Brief Psychiatry Resident Cross Cover Note  Patient with a history of chronic C. Diff found to be positive for C. Diff - will transfer to medicine for treatment as per hospital policy. Spoke with receiving physician. Recommend continuing current medications, initiating 1:1 for safety, and placing psychiatry consult for assistance. Patient would benefit from transfer back to psychiatry as soon as stable/hospital policy allows given the need for inpatient psychiatric care.    Praveena Quevedo MD  PGY2, N Psychiatry

## 2017-03-10 NOTE — PROGRESS NOTES
Social Work Services Progress Note      Data: SW following for return to inpatient psych.  Intervention: Spoke with ID MD.  Pt will be tested for C-dif again in a couple of days, and if tests negative, should be ok to transfer back to inpatient psych.  Pt will continue to have loose stools because this is her baseline.  Spoke with mental health intake and they are in agreement to evaluate pt once she has tested negatively for C-dif.      Plan:    Discharge Plans in Progress: Return to Inpatient Psych when medically stable.    Barriers to d/c plan: Medical.    Follow up Plan: SW available to assist with transfer back to Inpatient Psych.      MARGARITA De  [phone]

## 2017-03-10 NOTE — PROGRESS NOTES
Received call from lab-stool positive for Cdif-Dr Park notified[facility] to [facility]-spoke with pt placement who collected information and are in process of searching for bed-Please see MSSA scores and medication administration-has received Ativan 4 mg at 1605 and scheduled Klonopin 2 mg at 2019-BPs elevated throughout day and evening-last reading 2100 /92

## 2017-03-10 NOTE — PROGRESS NOTES
The pt was tearful and reported feeling hopeless and useless.  She is upset about being on contact isolation due to C-DIFF.  She reported being lonely and suicidal.     03/09/17 9538   Behavioral Health   Hallucinations auditory   Thinking confused   Orientation person: oriented;place: oriented;date: oriented;time: oriented   Memory baseline memory   Insight insight appropriate to situation   Judgement impaired   Eye Contact at examiner   Affect sad   Mood depressed;hopeless   Physical Appearance/Attire attire appropriate to age and situation   Hygiene well groomed   Suicidality thoughts and plan   Self Injury other (see comment)  (none stated)   Activity isolative;withdrawn;other (see comment)  (contact isolation)   Speech clear;coherent   Medication Sensitivity no stated side effects   Psychomotor / Gait balanced   Substance Withdrawal Interventions   Social and Therapeutic Interventions (Substance Withdrawal) encourage socialization with peers;encourage effective boundaries with peers;encourage participation in therapeutic groups and milieu activities   Safety   Suicidality status 15   Coping/Psychosocial   Verbalized Emotional State depression;frustration;guilt;hopelessness;loneliness;powerlessness;sadness;suicidal thoughts   Supportive Measures active listening utilized;self-care encouraged;verbalization of feelings encouraged   Psycho Education   Type of Intervention 1:1 intervention   Response participates, initiates socially appropriate   Hours 0.5   Treatment Detail check-in   Group Therapy Session   Group Attendance excused from group session   Activities of Daily Living   Hygiene/Grooming independent   Oral Hygiene independent   Dress scrubs (behavioral health)   Room Organization independent   Activity   Activity Level of Assistance independent   Behavioral Health Interventions   Depression maintain safety precautions;monitor need to revise level of observation;maintain safe secure environment;assist  patient in following safety plan;encourage nutrition and hydration;encourage participation / independence with adls;provide emotional support;establish therapeutic relationship;assist with developing and utilizing healthy coping strategies;build upon strengths   Social and Therapeutic Interventions (Depression) encourage socialization with peers;encourage effective boundaries with peers;encourage participation in therapeutic groups and milieu activities

## 2017-03-10 NOTE — PROVIDER NOTIFICATION
MSSA is 14. Per protocol pt is to receive 1-2mg of ativan. Pt very lethargic. Dr. Velasquez, cornelia, paged and order to hold ativan for now and give BP med instead. Will reassess mssa again in 2hrs per protocol. Dr. Velasquez here assessing pt.

## 2017-03-10 NOTE — PLAN OF CARE
"Problem: Goal Outcome Summary  Goal: Goal Outcome Summary  Continues with 1:1 sitter for SI. Migraine HA, Toradol IM given with fair relief. Vistaril this am for anxiety with some relief. MSSA this afternoon 14, scoring high due to no eating. Only drinking apple juice. Patient states, \"I have not eaten for months\". Nutrition consult placed. Ativan 1 mg given this afternoon, sleepy after. Rouses to voice and gentle touch. B/P 163/104, po Hydralazine given. B/P sl improved to 153/87. Received 6 K+ replacements and also Mag replacement given. K+ redraw at 1700 this evening. Refused po Vanco this am, agreed to take it this afternoon. Up to BR to void, continues to have small loose stools.       "

## 2017-03-10 NOTE — PROGRESS NOTES
"CLINICAL NUTRITION SERVICES - ASSESSMENT NOTE     Nutrition Prescription    RECOMMENDATIONS FOR MDs/PROVIDERS TO ORDER:  Recommend closely monitoring labs, especially K, Mag and Phos, as patient at risk for refeeding syndrome - replace as needed.    Malnutrition Status:    Unable to determine due to patient sleeping at time of visit and writer attempt to wake patient up, however patient may be at risk for malnutrition related to weight loss and predicted poor PO intake.    Recommendations already ordered by Registered Dietitian (RD):  - Ordered Ensure Plus and Clear to be sent with lunch and dinner.    Future/Additional Recommendations:  - Monitor weights  - Monitor tolerance to foods/fluids that patient able to keep down.  - Encourage good fluid intakes       REASON FOR ASSESSMENT  Ana Laura Wharton is a/an 46 year old female assessed by the dietitian for RN Consult - \"patient states she is not eating.\"    NUTRITION HISTORY  Visited with patient today and patient was sleeping. Attempted to wake patient up and she was able to provide the writer with a few short answers and continued to fall asleep during visit. This writer spoke with patient's sitter to obtain as much information as possible.    RD plan to see patient tomorrow.    CURRENT NUTRITION ORDERS  Diet: Regular  Intake/Tolerance: Unable to assess - patient had a meal tray on her table, however patient was sleeping so meal was not consumed. Sent up a trial of Ensure Clear and sitter plans to trial this with patient.    LABS  Labs reviewed  (+) c. Diff     Ref. Range 3/10/2017 02:24   Sodium Latest Ref Range: 133 - 144 mmol/L 146 (H)        Ref. Range 3/10/2017 02:24   Potassium Latest Ref Range: 3.4 - 5.3 mmol/L 2.8 (L)     Potassium replacements with IV started.    MEDICATIONS  Medications reviewed  Thiamine, folic acid, multivitamin with minerals, potassium chloride, magnesium sulfate    ANTHROPOMETRICS  Height: 5'5\"  Most Recent Weight: 116 lbs (52.6 kg)   "   IBW: 57 kg  BMI: Normal BMI  Weight History: Unable to obtain weight history from patient as she was very sedated. Per documented weight history below, patient has lost 4 lbs (3.3%) in about one month and about 10 (7.9%) in 6 months. Patient may have weight loss related to poor appetite with c.dif over the past 10 months.    Wt Readings from Last 10 Encounters:   03/09/17 52.6 kg (116 lb)   01/24/17 54.4 kg (120 lb)   09/12/16 57.3 kg (126 lb 6.4 oz)   09/12/16 55.3 kg (122 lb)   04/14/15 64.9 kg (143 lb)   03/31/15 62.6 kg (138 lb)   03/27/15 66.3 kg (146 lb 2.6 oz)   07/15/14 67.6 kg (149 lb)   04/10/14 65.5 kg (144 lb 8 oz)   12/23/12 60.1 kg (132 lb 8 oz)       Dosing Weight: 53 kg (most recent weight)    ASSESSED NUTRITION NEEDS  Estimated Energy Needs: 5608-0711+ kcals/day (30 - 35+ kcals/kg )  Justification: Increased needs/Repletion  Estimated Protein Needs: 53-61 grams protein/day (1 - 1.2 grams of pro/kg)  Justification: Increased needs  Estimated Fluid Needs: (1 mL/kcal)   Justification: fluid needs per MD order    PHYSICAL FINDINGS  See malnutrition section below.    MALNUTRITION  % Intake: Unable to assess  % Weight Loss: Up to 5% in 1 month (non-severe)  Subcutaneous Fat Loss: Unable to assess  Muscle Loss: Unable to assess  Fluid Accumulation/Edema: Unable to assess  Malnutrition Diagnosis: Unable to determine due to patient sleeping at time of visit and writer attempt to wake patient up, but patient may be at risk for malnutrition related to weight loss and predicted poor PO intake.    NUTRITION DIAGNOSIS  Predicted inadequate nutrient intake related to predicted poor PO intake 2/2 chronic c.diff, anxiety, alcohol and marijuana use as evidenced by weight loss of 4 lbs (3.3%) in about one month and about 10 (7.9%) in 6 months.      INTERVENTIONS  Implementation  Attempted to discuss nutrition history with patient and patient provided minimal answers to writer as she was sleeping/sedated during  visit. Discussed patient status with sitter. Discussed with patient and sitter sending nutrition supplements and both in agreement. Encouraged good food/fluid intake. Discussed RD re-visiting patient tomorrow.    Goals  Patient to consume > 75% of nutritionally adequate meal trays TID, or the equivalent with supplements/snacks.  Weight maintenance or gain at or above current weight of 116 lbs.     Monitoring/Evaluation  Progress toward goals will be monitored and evaluated per protocol.    Kristina Worrell RD, LD  [phone]

## 2017-03-10 NOTE — IP AVS SNAPSHOT
MRN:[id number]                      After Visit Summary   3/10/2017    Ana Laura Wharton    MRN: [id number]           Thank you!     Thank you for choosing [facility] for your care. Our goal is always to provide you with excellent care. Hearing back from our patients is one way we can continue to improve our services. Please take a few minutes to complete the written survey that you may receive in the mail after you visit with us. Thank you!        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 You were seen by Infectious Diseases. You were treated with vancomycin with subsequent improvement. You were seen by psychiatry to assess the need for ongoing inpatient psychiatric care and it was felt that you were stable to discharge home.Your hospital stay was otherwise uncomplicated and you will be discharged home in improving condition to complete an antibiotic taper per ID recommendations.                  Who to Call     For medical emergencies, please call 911.  For non-urgent questions about your medical care, please call your primary care provider or clinic, [phone]          Attending Provider     Provider Specialty    Pb Velasquez MD Internal Medicine    William Lopez MD Internal Medicine       Primary Care Provider Office Phone # Fax #    Robert Meza [phone]       [place] [address] [facility] [address]        After Care Instructions     Activity       Your activity upon discharge: activity as tolerated            Diet       Follow this diet upon discharge: Combination Diet Regular Diet Adult                  Follow-up Appointments     Adult [facility]  Follow-up and recommended labs and tests       Follow up with primary care provider, Robert Meza MD, within 7 days for hospital follow- up.  No follow up labs or test are needed.      Appointments on [facility] and/or [facility] (with [facility] or [employer] provider or service). Call [phone] if you haven't heard regarding these appointments within 7 days of discharge.                  Pending Results     No orders found from 3/8/2017 to 3/11/2017.            Statement of Approval     Ordered          03/14/17 0440  I have reviewed and agree with all the recommendations and orders detailed in this document.  EFFECTIVE NOW     Approved and electronically signed by:  Luis Segal PA-C             Admission Information     Date & Time Provider Department Dept. Phone    3/10/2017 William Lopez MD [facility] [phone]      Your Vitals Were     Blood Pressure Pulse Temperature Respirations Weight Pulse Oximetry    134/85 (BP Location: Right arm) 91 96.8  F (36  C) (Oral) 16 54.7 kg (120 lb 8 oz) 96%    BMI (Body Mass Index)                   20.05 kg/m2           [employer] Information     [employer] gives you secure access to your electronic health record. If you see a primary care provider, you can also send messages to your care team and make appointments. If you have questions, please call your primary care clinic.  If you do not have a primary care provider, please call [phone] and they will assist you.        Care EveryWhere ID     This is your Care EveryWhere ID. This could be used by other organizations to access your [place] medical records  [id number]           Review of your medicines      START taking        Dose / Directions    vancomycin 50 mg/mL Liqd solution   Commonly known as:  VANOCIN   Indication:  Clostridium difficile Bacteria        Take by mouth as directed:  125mg QID x 10 days, then 125mg TID x 7 days, then 125mg BID x 7 days, then 125mg daily x 7 days.   Quantity:  205 mL    Refills:  0         CONTINUE these medicines which may have CHANGED, or have new prescriptions. If we are uncertain of the size of tablets/capsules you have at home, strength may be listed as something that might have changed.        Dose / Directions    lisinopril 20 MG tablet   Commonly known as:  PRINIVIL/ZESTRIL   This may have changed:    - medication strength  - how much to take   Used for:  Essential hypertension        Dose:  20 mg   Take 1 tablet (20 mg) by mouth daily   Quantity:  30 tablet   Refills:  0         CONTINUE these medicines which have NOT CHANGED        Dose / Directions    AMITRIPTYLINE HCL PO        Dose:  10 mg   Take 10 mg by mouth At Bedtime   Refills:  0       clindamycin 1 % lotion   Commonly known as:  CLEOCIN T        Apply topically every morning   Refills:  0       * CLONAZEPAM PO        Dose:  1 mg   Take 1 mg by mouth every morning   Refills:  0       * CLONAZEPAM PO        Dose:  2 mg   Take 2 mg by mouth At Bedtime   Refills:  0       desogestrel-ethinyl estradiol 0.15-30 MG-MCG per tablet   Commonly known as:  APRI        Dose:  1 tablet   Take 1 tablet by mouth daily   Refills:  0       HYDROXYZINE HCL PO        Dose:  100 mg   Take 100 mg by mouth daily as needed   Refills:  0       IBUPROFEN PO        Dose:  400-600 mg   Take 400-600 mg by mouth every 6 hours as needed for moderate pain   Refills:  0       lithium 300 MG CR tablet   Commonly known as:  ESKALITH/LITHOBID   Used for:  Bipolar I disorder (H)        Dose:  300 mg   Take 1 tablet (300 mg) by mouth daily   Quantity:  30 tablet   Refills:  1       MIRTAZAPINE PO        Dose:  7.5 mg   Take 7.5 mg by mouth At Bedtime   Refills:  0       PROTONIX PO        Dose:  40 mg   Take 40 mg by mouth every morning (before breakfast)   Refills:  0       RELPAX PO        Dose:  20 mg   Take 20 mg by mouth at onset of headache for migraine (May repeat in 2 hours if needed)   Refills:  0       SYNTHROID PO        Dose:  50 mcg    Take 50 mcg by mouth daily   Refills:  0       TIZANIDINE HCL PO        Dose:  4 mg   Take 4 mg by mouth At Bedtime   Refills:  0       TORADOL IM        Dose:  30 mg   Inject 30 mg into the muscle daily as needed Per pt for migraines   Refills:  0       triamcinolone 0.1 % cream   Commonly known as:  KENALOG        Apply topically 2 times daily as needed for irritation   Refills:  0       TYLENOL 8 HOUR PO        Dose:  1000 mg   Take 1,000 mg by mouth every 6 hours as needed (pain)   Refills:  0       ZOFRAN ODT PO        Dose:  4 mg   Take 4 mg by mouth every 8 hours as needed for nausea   Refills:  0       * Notice:  This list has 2 medication(s) that are the same as other medications prescribed for you. Read the directions carefully, and ask your doctor or other care provider to review them with you.         Where to get your medicines      These medications were sent to [facility] [place], [place] - [address]  [address] [place] [address], [facility] [address]     Phone:  [phone]     lisinopril 20 MG tablet    vancomycin 50 mg/mL Liqd solution                Protect others around you: Learn how to safely use, store and throw away your medicines at www.disposemymeds.org.             Medication List: This is a list of all your medications and when to take them. Check marks below indicate your daily home schedule. Keep this list as a reference.      Medications           Morning Afternoon Evening Bedtime As Needed    AMITRIPTYLINE HCL PO   Take 10 mg by mouth At Bedtime   Last time this was given:  10 mg on 3/13/2017  9:29 PM                                clindamycin 1 % lotion   Commonly known as:  CLEOCIN T   Apply topically every morning                                * CLONAZEPAM PO   Take 1 mg by mouth every morning   Last time this was given:  1 mg on 3/14/2017 10:06 AM                                * CLONAZEPAM PO   Take 2 mg by mouth At Bedtime   Last time this was given:  1 mg  on 3/14/2017 10:06 AM                                desogestrel-ethinyl estradiol 0.15-30 MG-MCG per tablet   Commonly known as:  APRI   Take 1 tablet by mouth daily   Last time this was given:  1 tablet on 3/14/2017 10:15 AM                                HYDROXYZINE HCL PO   Take 100 mg by mouth daily as needed   Last time this was given:  50 mg on 3/14/2017  1:19 PM                                IBUPROFEN PO   Take 400-600 mg by mouth every 6 hours as needed for moderate pain                                lisinopril 20 MG tablet   Commonly known as:  PRINIVIL/ZESTRIL   Take 1 tablet (20 mg) by mouth daily   Last time this was given:  20 mg on 3/14/2017 10:07 AM                                lithium 300 MG CR tablet   Commonly known as:  ESKALITH/LITHOBID   Take 1 tablet (300 mg) by mouth daily   Last time this was given:  450 mg on 3/14/2017 10:07 AM                                MIRTAZAPINE PO   Take 7.5 mg by mouth At Bedtime   Last time this was given:  15 mg on 3/13/2017  9:29 PM                                PROTONIX PO   Take 40 mg by mouth every morning (before breakfast)   Last time this was given:  40 mg on 3/14/2017 10:06 AM                                RELPAX PO   Take 20 mg by mouth at onset of headache for migraine (May repeat in 2 hours if needed)                                SYNTHROID PO   Take 50 mcg by mouth daily   Last time this was given:  50 mcg on 3/14/2017 10:07 AM                                TIZANIDINE HCL PO   Take 4 mg by mouth At Bedtime                                TORADOL IM   Inject 30 mg into the muscle daily as needed Per pt for migraines                                triamcinolone 0.1 % cream   Commonly known as:  KENALOG   Apply topically 2 times daily as needed for irritation                                TYLENOL 8 HOUR PO   Take 1,000 mg by mouth every 6 hours as needed (pain)                                vancomycin 50 mg/mL Liqd solution   Commonly known as:   VANOCIN   Take by mouth as directed:  125mg QID x 10 days, then 125mg TID x 7 days, then 125mg BID x 7 days, then 125mg daily x 7 days.   Last time this was given:  125 mg on 3/14/2017  5:32 PM                                ZOFRAN ODT PO   Take 4 mg by mouth every 8 hours as needed for nausea   Last time this was given:  4 mg on 3/13/2017  3:01 AM                                * Notice:  This list has 2 medication(s) that are the same as other medications prescribed for you. Read the directions carefully, and ask your doctor or other care provider to review them with you.

## 2017-03-10 NOTE — IP AVS SNAPSHOT
[facility]    [address]    [place] [address]    Phone:  [phone]                                       After Visit Summary   3/10/2017    Ana Laura Wharton    MRN: [id number]           After Visit Summary Signature Page     I have received my discharge instructions, and my questions have been answered. I have discussed any challenges I see with this plan with the nurse or doctor.    ..........................................................................................................................................  Patient/Patient Representative Signature      ..........................................................................................................................................  Patient Representative Print Name and Relationship to Patient    ..................................................               ................................................  Date                                            Time    ..........................................................................................................................................  Reviewed by Signature/Title    ...................................................              ..............................................  Date                                                            Time

## 2017-03-10 NOTE — PLAN OF CARE
Problem: Goal Outcome Summary  Goal: Goal Outcome Summary  Outcome: Declining  C-diff positive, pt transferred from [facility] into a private room. Immediately placed on isolation. Pt also on [facility] 20 for suicidal attempt? Pt requiring a sitter for suicide. Pt very lethargic tonight. MSSA scores in low teens but due to lethargy, paged Dr. Velasquez. Per Dr. Velasquez hold ativan for now even though protocol dictates to give. Recheck as per protocol. Room striped for suicide precautions.  Pt continues to be lethargic but at one point pt informed she had c-diff and pt started to have a panic attack and got very teary eyed. Requesting to call . Pt also scorced 13 on mssa scale. Pt given ativan. Potassium came back at 2.8 and replacements IV started as pt too sedated to take oral. Pt informed nurse that she has had c-diff in the past and vanco PO nor a fecal transplant helped. She was put on a med called difficid??? Which finally was effective. Paged moonlighter x3 with no response. This information past onto oncoming nurse.  Call light in reach and pt doesn't always make needs known.
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
## 2017-03-11 LAB
ANION GAP SERPL CALCULATED.3IONS-SCNC: 5 MMOL/L (ref 3–14)
BUN SERPL-MCNC: 5 MG/DL (ref 7–30)
CALCIUM SERPL-MCNC: 7.4 MG/DL (ref 8.5–10.1)
CHLORIDE SERPL-SCNC: 114 MMOL/L (ref 94–109)
CO2 SERPL-SCNC: 26 MMOL/L (ref 20–32)
CREAT SERPL-MCNC: 0.76 MG/DL (ref 0.52–1.04)
GFR SERPL CREATININE-BSD FRML MDRD: 82 ML/MIN/1.7M2
GLUCOSE SERPL-MCNC: 88 MG/DL (ref 70–99)
MAGNESIUM SERPL-MCNC: 2.3 MG/DL (ref 1.6–2.3)
POTASSIUM SERPL-SCNC: 4 MMOL/L (ref 3.4–5.3)
SODIUM SERPL-SCNC: 145 MMOL/L (ref 133–144)

## 2017-03-11 PROCEDURE — 80048 BASIC METABOLIC PNL TOTAL CA: CPT | Performed by: NURSE PRACTITIONER

## 2017-03-11 PROCEDURE — 25000132 ZZH RX MED GY IP 250 OP 250 PS 637: Performed by: INTERNAL MEDICINE

## 2017-03-11 PROCEDURE — 25000128 H RX IP 250 OP 636: Performed by: INTERNAL MEDICINE

## 2017-03-11 PROCEDURE — 99232 SBSQ HOSP IP/OBS MODERATE 35: CPT | Performed by: INTERNAL MEDICINE

## 2017-03-11 PROCEDURE — 83735 ASSAY OF MAGNESIUM: CPT | Performed by: NURSE PRACTITIONER

## 2017-03-11 PROCEDURE — 99233 SBSQ HOSP IP/OBS HIGH 50: CPT | Performed by: PSYCHIATRY & NEUROLOGY

## 2017-03-11 PROCEDURE — 25800025 ZZH RX 258: Performed by: INTERNAL MEDICINE

## 2017-03-11 PROCEDURE — 36415 COLL VENOUS BLD VENIPUNCTURE: CPT | Performed by: NURSE PRACTITIONER

## 2017-03-11 PROCEDURE — 12000001 ZZH R&B MED SURG/OB UMMC

## 2017-03-11 RX ORDER — SUCRALFATE ORAL 1 G/10ML
1 SUSPENSION ORAL
Status: DISCONTINUED | OUTPATIENT
Start: 2017-03-11 | End: 2017-03-14 | Stop reason: HOSPADM

## 2017-03-11 RX ORDER — DEXTROSE MONOHYDRATE, SODIUM CHLORIDE, AND POTASSIUM CHLORIDE 50; 1.49; 4.5 G/1000ML; G/1000ML; G/1000ML
INJECTION, SOLUTION INTRAVENOUS CONTINUOUS
Status: DISCONTINUED | OUTPATIENT
Start: 2017-03-11 | End: 2017-03-14 | Stop reason: HOSPADM

## 2017-03-11 RX ADMIN — DESOGESTREL AND ETHINYL ESTRADIOL 1 TABLET: KIT at 09:17

## 2017-03-11 RX ADMIN — AMITRIPTYLINE HYDROCHLORIDE 10 MG: 10 TABLET, FILM COATED ORAL at 21:47

## 2017-03-11 RX ADMIN — LORAZEPAM 1 MG: 1 TABLET ORAL at 10:04

## 2017-03-11 RX ADMIN — CLONAZEPAM 1 MG: 0.5 TABLET ORAL at 09:13

## 2017-03-11 RX ADMIN — VANCOMYCIN HYDROCHLORIDE 125 MG: 100 INJECTION, POWDER, LYOPHILIZED, FOR SOLUTION INTRAVENOUS at 12:34

## 2017-03-11 RX ADMIN — MULTIPLE VITAMINS W/ MINERALS TAB 1 TABLET: TAB at 09:13

## 2017-03-11 RX ADMIN — CLONAZEPAM 2 MG: 0.5 TABLET ORAL at 21:46

## 2017-03-11 RX ADMIN — Medication 1 G: at 16:14

## 2017-03-11 RX ADMIN — LITHIUM CARBONATE 450 MG: 450 TABLET, EXTENDED RELEASE ORAL at 09:14

## 2017-03-11 RX ADMIN — MIRTAZAPINE 15 MG: 15 TABLET, FILM COATED ORAL at 21:47

## 2017-03-11 RX ADMIN — VANCOMYCIN HYDROCHLORIDE 125 MG: 100 INJECTION, POWDER, LYOPHILIZED, FOR SOLUTION INTRAVENOUS at 20:37

## 2017-03-11 RX ADMIN — HYDROXYZINE HYDROCHLORIDE 50 MG: 25 TABLET ORAL at 18:56

## 2017-03-11 RX ADMIN — HYDROXYZINE HYDROCHLORIDE 50 MG: 25 TABLET ORAL at 12:43

## 2017-03-11 RX ADMIN — LISINOPRIL 10 MG: 5 TABLET ORAL at 09:13

## 2017-03-11 RX ADMIN — PANTOPRAZOLE SODIUM 40 MG: 40 TABLET, DELAYED RELEASE ORAL at 06:26

## 2017-03-11 RX ADMIN — VANCOMYCIN HYDROCHLORIDE 125 MG: 100 INJECTION, POWDER, LYOPHILIZED, FOR SOLUTION INTRAVENOUS at 09:14

## 2017-03-11 RX ADMIN — SODIUM CHLORIDE: 9 INJECTION, SOLUTION INTRAVENOUS at 02:21

## 2017-03-11 RX ADMIN — CYCLOBENZAPRINE HYDROCHLORIDE 10 MG: 10 TABLET, FILM COATED ORAL at 21:47

## 2017-03-11 RX ADMIN — Medication 100 MG: at 09:14

## 2017-03-11 RX ADMIN — LORAZEPAM 1 MG: 1 TABLET ORAL at 16:15

## 2017-03-11 RX ADMIN — POTASSIUM CHLORIDE, DEXTROSE MONOHYDRATE AND SODIUM CHLORIDE 1000 ML: 150; 5; 450 INJECTION, SOLUTION INTRAVENOUS at 16:14

## 2017-03-11 RX ADMIN — HYDRALAZINE HYDROCHLORIDE 25 MG: 25 TABLET ORAL at 18:56

## 2017-03-11 RX ADMIN — LORAZEPAM 1 MG: 1 TABLET ORAL at 00:52

## 2017-03-11 RX ADMIN — HYDRALAZINE HYDROCHLORIDE 25 MG: 25 TABLET ORAL at 12:43

## 2017-03-11 RX ADMIN — FOLIC ACID 1 MG: 1 TABLET ORAL at 09:14

## 2017-03-11 RX ADMIN — Medication 1 G: at 12:35

## 2017-03-11 RX ADMIN — HYDRALAZINE HYDROCHLORIDE 25 MG: 25 TABLET ORAL at 22:28

## 2017-03-11 RX ADMIN — LEVOTHYROXINE SODIUM 50 MCG: 50 TABLET ORAL at 09:14

## 2017-03-11 RX ADMIN — SODIUM CHLORIDE: 9 INJECTION, SOLUTION INTRAVENOUS at 10:05

## 2017-03-11 RX ADMIN — VANCOMYCIN HYDROCHLORIDE 125 MG: 100 INJECTION, POWDER, LYOPHILIZED, FOR SOLUTION INTRAVENOUS at 16:14

## 2017-03-11 RX ADMIN — Medication 1 G: at 21:47

## 2017-03-11 RX ADMIN — HYDROXYZINE HYDROCHLORIDE 25 MG: 25 TABLET ORAL at 04:51

## 2017-03-11 NOTE — PLAN OF CARE
Problem: Goal Outcome Summary  Goal: Goal Outcome Summary  Continues with 1:1 bedside attendant SI. Denies any suicidal ideation during this shift. Alert & Oriented X4. lungs sound clear, Denies chest pain, short of breath, neuros and CMS intact. C/o of Migraine headache, Toradol given, effective. C/o nausea, given Zofran. MSSA score was 12, Ativan 1 mg given per pt request. Pt is on regular diet, no appetite in this shift, only drinking ensure and apple juice. On K+ replacements, oral K+ given. Continues to have loose stools and voiding without difficulties in BR. Will continue to monitor patient.        .

## 2017-03-11 NOTE — PLAN OF CARE
Problem: Goal Outcome Summary  Goal: Goal Outcome Summary  Outcome: Improving  Patient A/Ox4. VSS. Denies CP, SOB, dizziness/LH. LSCTA. +fl/BS. Voiding with out difficulty, CMS intact,tolerating regular diet eating very small mount, without NV. IS encouraged. Activity SBA . IV infusing, no suicidal edition this shift per pt, on MSSA protocol, Patient has demonstrated ability to call appropriately, bedside attendant in the room, Patient is resting with call light within reach. Will continue to monitor.

## 2017-03-11 NOTE — PROGRESS NOTES
[facility] Internal Medicine Progress Note            Interval History:   Record reviewed.  Seen with RN.  Remains voluntary.  Psychiatry follow up pending.  Nausea persists without emesis.  Large soupy/mushy BM last night. Subsequent liquid stools X2.  Indicates same GI issues last year.  Continued reflux.  Diffuse abdominal soreness.  Voiding OK.  Up to shower with mild dizziness. No CP, SOB.  HA improved.  Remains depressed.  Denies suicidal thoughts.             Medications:   All medications reviewed today          Physical Exam:   Blood pressure 162/84, pulse 101, temperature 97  F (36.1  C), temperature source Oral, resp. rate 16, weight 54.7 kg (120 lb 8 oz), SpO2 97 %.    Intake/Output Summary (Last 24 hours) at 03/11/17 1433  Last data filed at 03/11/17 0844   Gross per 24 hour   Intake              360 ml   Output             1100 ml   Net             -740 ml       General:  Alert.  Not distressed.  Overall appears clinically improved since 3/10.      Heent:      Neck:    Skin:    Chest:  clear    Cardiac: reg without gallop, murmur.      Abdomen:  Non distended, soft, diffusely tender.  No guarding.  BS normal.     Extremities:  Perfused.  No edema, calf, thigh tenderness.     Neuro:            Data:     Results for orders placed or performed during the hospital encounter of 03/10/17 (from the past 24 hour(s))   Potassium   Result Value Ref Range    Potassium 3.4 3.4 - 5.3 mmol/L   Magnesium   Result Value Ref Range    Magnesium 2.3 1.6 - 2.3 mg/dL   Basic metabolic panel   Result Value Ref Range    Sodium 145 (H) 133 - 144 mmol/L    Potassium 4.0 3.4 - 5.3 mmol/L    Chloride 114 (H) 94 - 109 mmol/L    Carbon Dioxide 26 20 - 32 mmol/L    Anion Gap 5 3 - 14 mmol/L    Glucose 88 70 - 99 mg/dL    Urea Nitrogen 5 (L) 7 - 30 mg/dL    Creatinine 0.76 0.52 - 1.04 mg/dL    GFR Estimate 82 >60 mL/min/1.7m2    GFR Estimate If Black >90   GFR Calc   >60 mL/min/1.7m2    Calcium 7.4 (L) 8.5 -  10.1 mg/dL     Lab Results   Component Value Date    WBC 10.1 03/10/2017     Lab Results   Component Value Date    RBC 3.12 03/10/2017     Lab Results   Component Value Date    HGB 9.1 03/10/2017     Lab Results   Component Value Date    HCT 28.8 03/10/2017     Lab Results   Component Value Date    MCV 92 03/10/2017     Lab Results   Component Value Date    MCH 29.2 03/10/2017     Lab Results   Component Value Date    MCHC 31.6 03/10/2017     Lab Results   Component Value Date    RDW 16.1 03/10/2017     Lab Results   Component Value Date     03/10/2017                Assessment and Plan:   1)  C. Difficile colitis.  Complex history including fecal transplant.  Likely triggerred by recent ABs.  Diarrhea persists but overall appears improved (less distressed, no emesis).  Tolerating Vanco.   2)  Migraine HA improved.    3)  HTN, labile control. Potentially aggravated by IV fluids.  4)  Bipolar illness, depressed.   5)  Hypernatremia (improved), hypokalemia (replaced).    6)  Anemia possibly CD.  Consider combined iron/B12 deficiency.   7)  Polysubstance abuse (alcohol, THC).  No overt stigmata of WD.   8)  GERD - potential gastritis, esophagitis contributing to nausea.       PLAN:  1)  Adjust IV fluids. Nutritional supplements.  Continue PPI.  Add carafate.  2)  Continue po Vanco - recheck stools 3-5 days as if neg can transfer to .  3)  Psyche follow up daily.  1:1 sitter.  4)  Review meds/orders.  5)  MSSA.  6)  Trend labs. Add iron studies, B12/folate.  Monitor clinically.            Attestation:  I have reviewed today's vital signs, notes, medications, labs and imaging.     William Lopez MD

## 2017-03-11 NOTE — PLAN OF CARE
Problem: Goal Outcome Summary  Goal: Goal Outcome Summary  Outcome: Improving  Pt A/O X 4. Afebrile. VSS except BP elevated. Bedside attendant for suicide precautions. Enteric precautions for c.diff. Slept between cares. MSSA score of 13, 1 mg Ativan given at 0052; reassessed at 0400 for a score of 7; next assessment due at 0800. Lungs- clear, equal bilaterally. IS encouraged.  PP 2+ DP 2+. CMS and Neuro's are intact. Denies numbness and tingling in all extremities. Denies nausea, shortness of breath, and chest pain. Has occasional headache pain, refused pain interventions during shift. Skin intact and WDL. Voids spontaneously without difficulty in the bedside commode. Is on a regular diet, no meal this shift, per report appetite has been poor. Bowels- audible and active in all four quadrants, is passing flatus, last BM 3/10, stools are loose. Pt up standby assist. PIV is patent in the left forearm and fluids infusing at 125 mL/hr. PCD's on BLE's. Pt is able to make needs known and the call light is within the pt's reach. Continue to monitor.

## 2017-03-11 NOTE — PROGRESS NOTES
No 72 hour hold. Patient has agreed to voluntary treatment. If this changes staff is to call house officer.  This was confirmed with Dr Lopez.
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
## 2017-03-11 NOTE — PROGRESS NOTES
SPIRITUAL HEALTH SERVICES  [facility] ([facility]) 8A  ON-CALL VISIT     REFERRAL SOURCE: I visited Ana Laura this morning per [facility] hospital  referral. When I get into the pt room, pt was on the phone. However, she told me that she is not interested to get any spiritual support at this moment. I did respect pt respond and left the pt room quietly.     Pt got all the info about the SHS from this on-call .     PLAN: The unit  will notify, if further follow up needed.     Shraddha Garza M.Div. ([place]lem), M.Th., D.Min., [facility]  Staff   Pager [phone]
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
## 2017-03-12 LAB
ALBUMIN UR-MCNC: NEGATIVE MG/DL
ANION GAP SERPL CALCULATED.3IONS-SCNC: 7 MMOL/L (ref 3–14)
APPEARANCE UR: CLEAR
BILIRUB UR QL STRIP: NEGATIVE
BUN SERPL-MCNC: 5 MG/DL (ref 7–30)
CALCIUM SERPL-MCNC: 8.4 MG/DL (ref 8.5–10.1)
CHLORIDE SERPL-SCNC: 111 MMOL/L (ref 94–109)
CO2 SERPL-SCNC: 26 MMOL/L (ref 20–32)
COLOR UR AUTO: ABNORMAL
CREAT SERPL-MCNC: 0.75 MG/DL (ref 0.52–1.04)
FOLATE SERPL-MCNC: 4.4 NG/ML
GFR SERPL CREATININE-BSD FRML MDRD: 83 ML/MIN/1.7M2
GLUCOSE SERPL-MCNC: 113 MG/DL (ref 70–99)
GLUCOSE UR STRIP-MCNC: NEGATIVE MG/DL
HGB BLD-MCNC: 8.8 G/DL (ref 11.7–15.7)
HGB UR QL STRIP: NEGATIVE
IRON SATN MFR SERPL: 17 % (ref 15–46)
IRON SERPL-MCNC: 38 UG/DL (ref 35–180)
KETONES UR STRIP-MCNC: NEGATIVE MG/DL
LEUKOCYTE ESTERASE UR QL STRIP: NEGATIVE
MAGNESIUM SERPL-MCNC: 1.9 MG/DL (ref 1.6–2.3)
NITRATE UR QL: NEGATIVE
PH UR STRIP: 7.5 PH (ref 5–7)
POTASSIUM SERPL-SCNC: 4 MMOL/L (ref 3.4–5.3)
SODIUM SERPL-SCNC: 144 MMOL/L (ref 133–144)
SP GR UR STRIP: 1 (ref 1–1.03)
TIBC SERPL-MCNC: 221 UG/DL (ref 240–430)
URN SPEC COLLECT METH UR: ABNORMAL
UROBILINOGEN UR STRIP-MCNC: NORMAL MG/DL (ref 0–2)
VIT B12 SERPL-MCNC: 596 PG/ML (ref 193–986)

## 2017-03-12 PROCEDURE — 25000128 H RX IP 250 OP 636: Performed by: NURSE PRACTITIONER

## 2017-03-12 PROCEDURE — 80048 BASIC METABOLIC PNL TOTAL CA: CPT | Performed by: INTERNAL MEDICINE

## 2017-03-12 PROCEDURE — 12000001 ZZH R&B MED SURG/OB UMMC

## 2017-03-12 PROCEDURE — 83735 ASSAY OF MAGNESIUM: CPT | Performed by: INTERNAL MEDICINE

## 2017-03-12 PROCEDURE — 36415 COLL VENOUS BLD VENIPUNCTURE: CPT | Performed by: INTERNAL MEDICINE

## 2017-03-12 PROCEDURE — 99232 SBSQ HOSP IP/OBS MODERATE 35: CPT | Performed by: PSYCHIATRY & NEUROLOGY

## 2017-03-12 PROCEDURE — 25000132 ZZH RX MED GY IP 250 OP 250 PS 637: Performed by: INTERNAL MEDICINE

## 2017-03-12 PROCEDURE — 81003 URINALYSIS AUTO W/O SCOPE: CPT | Performed by: INTERNAL MEDICINE

## 2017-03-12 PROCEDURE — 82607 VITAMIN B-12: CPT | Performed by: INTERNAL MEDICINE

## 2017-03-12 PROCEDURE — 83540 ASSAY OF IRON: CPT | Performed by: INTERNAL MEDICINE

## 2017-03-12 PROCEDURE — 25800025 ZZH RX 258: Performed by: INTERNAL MEDICINE

## 2017-03-12 PROCEDURE — 99232 SBSQ HOSP IP/OBS MODERATE 35: CPT | Performed by: INTERNAL MEDICINE

## 2017-03-12 PROCEDURE — 82746 ASSAY OF FOLIC ACID SERUM: CPT | Performed by: INTERNAL MEDICINE

## 2017-03-12 PROCEDURE — 85018 HEMOGLOBIN: CPT | Performed by: INTERNAL MEDICINE

## 2017-03-12 PROCEDURE — 83550 IRON BINDING TEST: CPT | Performed by: INTERNAL MEDICINE

## 2017-03-12 RX ORDER — LISINOPRIL 5 MG/1
10 TABLET ORAL ONCE
Status: COMPLETED | OUTPATIENT
Start: 2017-03-12 | End: 2017-03-12

## 2017-03-12 RX ORDER — SIMETHICONE 80 MG
80 TABLET,CHEWABLE ORAL EVERY 6 HOURS PRN
Status: DISCONTINUED | OUTPATIENT
Start: 2017-03-12 | End: 2017-03-14 | Stop reason: HOSPADM

## 2017-03-12 RX ORDER — LISINOPRIL 20 MG/1
20 TABLET ORAL DAILY
Status: DISCONTINUED | OUTPATIENT
Start: 2017-03-13 | End: 2017-03-14 | Stop reason: HOSPADM

## 2017-03-12 RX ADMIN — VANCOMYCIN HYDROCHLORIDE 125 MG: 100 INJECTION, POWDER, LYOPHILIZED, FOR SOLUTION INTRAVENOUS at 13:11

## 2017-03-12 RX ADMIN — Medication 1 G: at 07:16

## 2017-03-12 RX ADMIN — Medication 1 G: at 16:14

## 2017-03-12 RX ADMIN — CLONAZEPAM 2 MG: 0.5 TABLET ORAL at 21:26

## 2017-03-12 RX ADMIN — Medication 100 MG: at 08:15

## 2017-03-12 RX ADMIN — HYDROXYZINE HYDROCHLORIDE 50 MG: 25 TABLET ORAL at 20:14

## 2017-03-12 RX ADMIN — VANCOMYCIN HYDROCHLORIDE 125 MG: 100 INJECTION, POWDER, LYOPHILIZED, FOR SOLUTION INTRAVENOUS at 08:16

## 2017-03-12 RX ADMIN — Medication 1 G: at 21:27

## 2017-03-12 RX ADMIN — POTASSIUM CHLORIDE, DEXTROSE MONOHYDRATE AND SODIUM CHLORIDE: 150; 5; 450 INJECTION, SOLUTION INTRAVENOUS at 01:57

## 2017-03-12 RX ADMIN — LORAZEPAM 1 MG: 1 TABLET ORAL at 10:36

## 2017-03-12 RX ADMIN — MIRTAZAPINE 15 MG: 15 TABLET, FILM COATED ORAL at 21:25

## 2017-03-12 RX ADMIN — CLONAZEPAM 1 MG: 0.5 TABLET ORAL at 08:15

## 2017-03-12 RX ADMIN — FOLIC ACID 1 MG: 1 TABLET ORAL at 08:15

## 2017-03-12 RX ADMIN — DESOGESTREL AND ETHINYL ESTRADIOL 1 TABLET: KIT at 08:19

## 2017-03-12 RX ADMIN — KETOROLAC TROMETHAMINE 30 MG: 30 INJECTION, SOLUTION INTRAMUSCULAR at 14:05

## 2017-03-12 RX ADMIN — LITHIUM CARBONATE 450 MG: 450 TABLET, EXTENDED RELEASE ORAL at 08:15

## 2017-03-12 RX ADMIN — LISINOPRIL 10 MG: 5 TABLET ORAL at 16:49

## 2017-03-12 RX ADMIN — HYDROXYZINE HYDROCHLORIDE 50 MG: 25 TABLET ORAL at 14:05

## 2017-03-12 RX ADMIN — LEVOTHYROXINE SODIUM 50 MCG: 50 TABLET ORAL at 08:15

## 2017-03-12 RX ADMIN — Medication 1 G: at 13:10

## 2017-03-12 RX ADMIN — AMITRIPTYLINE HYDROCHLORIDE 10 MG: 10 TABLET, FILM COATED ORAL at 21:26

## 2017-03-12 RX ADMIN — PANTOPRAZOLE SODIUM 40 MG: 40 TABLET, DELAYED RELEASE ORAL at 07:16

## 2017-03-12 RX ADMIN — VANCOMYCIN HYDROCHLORIDE 125 MG: 100 INJECTION, POWDER, LYOPHILIZED, FOR SOLUTION INTRAVENOUS at 20:14

## 2017-03-12 RX ADMIN — MULTIPLE VITAMINS W/ MINERALS TAB 1 TABLET: TAB at 08:16

## 2017-03-12 RX ADMIN — ACETAMINOPHEN 650 MG: 325 TABLET, FILM COATED ORAL at 21:26

## 2017-03-12 RX ADMIN — VANCOMYCIN HYDROCHLORIDE 125 MG: 100 INJECTION, POWDER, LYOPHILIZED, FOR SOLUTION INTRAVENOUS at 16:14

## 2017-03-12 RX ADMIN — LISINOPRIL 10 MG: 5 TABLET ORAL at 08:15

## 2017-03-12 NOTE — PLAN OF CARE
"Problem: Goal Outcome Summary  Goal: Goal Outcome Summary  Outcome: No Change  A/Ox4, VSS ex tachycardic at times and high BPs, x1 lisinopril 10mg given and Dr. Lopez increased morning dose,  LCTA,  BS active, voiding adequate amounts in BR with SBA, PIV SL, CMS intact. Pt c/o abdominal pain, writer offered prn medication and pt refused. 1:1 sitter present in room, pts denies SI at this time. MSSA score 10, Ativan 1mg given. Patient stated \"I've evened out and I need to go home as soon as possible. If I'm not on a hold I want to leave.\" Dr. Lopez notified of pts wishes. Patient Denies- Chest Pain, SOB, Nausea, Calf Tenderness and Dizziness.   Patient is able to make needs known and call light is within reach. Will continue to monitor.                          "
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
## 2017-03-12 NOTE — PROGRESS NOTES
"[facility] Internal Medicine Progress Note            Interval History:   Record reviewed.  Seen with RN.  Remains voluntary.  Psychiatry follow up 3/11 noted.  This AM indicated desire to go home.  Indicated feeling much improved. Mood better. Denies suicidal ideation.  Tolerating diet.  Nausea resolved. No reflux (better with carafate).  No abdominal pain.  Stools remain \"soupy\" like they have over the last year.  Voiding OK.  Up in room without dizziness.  No CP, SOB.  HA resolved.             Medications:   All medications reviewed today          Physical Exam:   Blood pressure (!) 159/95, pulse 100, temperature 97.5  F (36.4  C), temperature source Oral, resp. rate 16, weight 54.7 kg (120 lb 8 oz), SpO2 99 %.    Intake/Output Summary (Last 24 hours) at 03/11/17 1433  Last data filed at 03/11/17 0844   Gross per 24 hour   Intake              360 ml   Output             1100 ml   Net             -740 ml       General:  Alert.  Appropriate. No distress.  Clinically appears much improved.     Heent:      Neck:    Skin:    Chest:  clear    Cardiac: reg without gallop, murmur.      Abdomen:  Non distended, soft, minimal diffuse discomfort.  No guarding.  BS normal.     Extremities:  Perfused.  No edema, calf, thigh tenderness.     Neuro:            Data:     Results for orders placed or performed during the hospital encounter of 03/10/17 (from the past 24 hour(s))   UA reflex to Microscopic and Culture   Result Value Ref Range    Color Urine Straw     Appearance Urine Clear     Glucose Urine Negative NEG mg/dL    Bilirubin Urine Negative NEG    Ketones Urine Negative NEG mg/dL    Specific Gravity Urine 1.003 1.003 - 1.035    Blood Urine Negative NEG    pH Urine 7.5 (H) 5.0 - 7.0 pH    Protein Albumin Urine Negative NEG mg/dL    Urobilinogen mg/dL Normal 0.0 - 2.0 mg/dL    Nitrite Urine Negative NEG    Leukocyte Esterase Urine Negative NEG    Source Clean catch urine    Basic metabolic panel   Result Value Ref " Range    Sodium 144 133 - 144 mmol/L    Potassium 4.0 3.4 - 5.3 mmol/L    Chloride 111 (H) 94 - 109 mmol/L    Carbon Dioxide 26 20 - 32 mmol/L    Anion Gap 7 3 - 14 mmol/L    Glucose 113 (H) 70 - 99 mg/dL    Urea Nitrogen 5 (L) 7 - 30 mg/dL    Creatinine 0.75 0.52 - 1.04 mg/dL    GFR Estimate 83 >60 mL/min/1.7m2    GFR Estimate If Black >90   GFR Calc   >60 mL/min/1.7m2    Calcium 8.4 (L) 8.5 - 10.1 mg/dL   Magnesium   Result Value Ref Range    Magnesium 1.9 1.6 - 2.3 mg/dL   Hemoglobin   Result Value Ref Range    Hemoglobin 8.8 (L) 11.7 - 15.7 g/dL   Vitamin B12   Result Value Ref Range    Vitamin B12 596 193 - 986 pg/mL   Folate   Result Value Ref Range    Folate 4.4 (L) >5.4 ng/mL   Iron and iron binding capacity   Result Value Ref Range    Iron 38 35 - 180 ug/dL    Iron Binding Cap 221 (L) 240 - 430 ug/dL    Iron Saturation Index 17 15 - 46 %     Lab Results   Component Value Date    WBC 10.1 03/10/2017     Lab Results   Component Value Date    RBC 3.12 03/10/2017     Lab Results   Component Value Date    HGB 9.1 03/10/2017     Lab Results   Component Value Date    HCT 28.8 03/10/2017     Lab Results   Component Value Date    MCV 92 03/10/2017     Lab Results   Component Value Date    MCH 29.2 03/10/2017     Lab Results   Component Value Date    MCHC 31.6 03/10/2017     Lab Results   Component Value Date    RDW 16.1 03/10/2017     Lab Results   Component Value Date     03/10/2017                Assessment and Plan:   1)  C. Difficile colitis.  Complex history including fecal transplant.  Likely triggerred by recent ABs.  Diarrhea persists (baseline per pt) but overall appears improved (less distressed, nausea resolved, decrease abdominal pain).  Tolerating Vanco.   2)  Migraine HA resolved.     3)  HTN, labile control with more persistent elevation.   4)  Bipolar illness, depressed.   5)  Hypernatremia (improved), hypokalemia (replaced).  Resolved.   6)  Anemia possibly CD (supported by  iron studies) plus folate deficiency  7)  Polysubstance abuse (alcohol, THC).  No overt stigmata of WD.   8)  GERD - potential gastritis, esophagitis contributing to nausea.  Improved with carafate.     PLAN:  1)  SL IV.  Nutritional supplements.  Continue PPI plus carafate.  2)  Continue po Vanco - recheck stools 3-5 days as if neg can transfer to  unless psychiatry comfortable with DC.   3)  Psyche follow up - contacted. Continue  1:1 sitter. Place hold if pt decides to leave.  4)  Replace folate.   5)  Complete MSSA.  6)  Trend labs.  Monitor clinically. Pt agrees to stay for psyche follow up and recheck labs 3/14 off IV's.            Attestation:  I have reviewed today's vital signs, notes, medications, labs and imaging.     William Lopez MD

## 2017-03-12 NOTE — PLAN OF CARE
Problem: Goal Outcome Summary  Goal: Goal Outcome Summary  Pt. A&Ox4. VSS. Afebrile. Lungs-CTA. Bowels-active. Pt c/o abdominal pain, discussed w/ moonlighter in person, order added for simethicone and UA as pt was worried about a UTI. Moonlighter aware of UA results. Pt declined to try simethicone stated she didn't think it would help. Gave pt warm blanket for abdomen w/ some improvement in pain, other options offered. PP+ DP+. CMS and neuro's are intact. Denies shortness of breath, and chest pain. Voids spontaneously without difficulty. Pt up with SBA. Bedside attendant present for SI, not presently having thoughts of SI. MSSA score 4. PIV is patent and infusing. Bilateral heels are elevated off the bed. Pt able to make needs known. Will continue to monitor.

## 2017-03-13 LAB
ANION GAP SERPL CALCULATED.3IONS-SCNC: 7 MMOL/L (ref 3–14)
BUN SERPL-MCNC: 6 MG/DL (ref 7–30)
CALCIUM SERPL-MCNC: 9.3 MG/DL (ref 8.5–10.1)
CHLORIDE SERPL-SCNC: 108 MMOL/L (ref 94–109)
CO2 SERPL-SCNC: 28 MMOL/L (ref 20–32)
CREAT SERPL-MCNC: 0.8 MG/DL (ref 0.52–1.04)
ERYTHROCYTE [DISTWIDTH] IN BLOOD BY AUTOMATED COUNT: 17 % (ref 10–15)
GFR SERPL CREATININE-BSD FRML MDRD: 78 ML/MIN/1.7M2
GLUCOSE SERPL-MCNC: 101 MG/DL (ref 70–99)
HCT VFR BLD AUTO: 32.1 % (ref 35–47)
HGB BLD-MCNC: 10.1 G/DL (ref 11.7–15.7)
LITHIUM SERPL-SCNC: 0.3 MMOL/L (ref 0.6–1.2)
MAGNESIUM SERPL-MCNC: 1.9 MG/DL (ref 1.6–2.3)
MCH RBC QN AUTO: 29.4 PG (ref 26.5–33)
MCHC RBC AUTO-ENTMCNC: 31.5 G/DL (ref 31.5–36.5)
MCV RBC AUTO: 94 FL (ref 78–100)
PLATELET # BLD AUTO: 314 10E9/L (ref 150–450)
POTASSIUM SERPL-SCNC: 4.2 MMOL/L (ref 3.4–5.3)
RBC # BLD AUTO: 3.43 10E12/L (ref 3.8–5.2)
SODIUM SERPL-SCNC: 143 MMOL/L (ref 133–144)
WBC # BLD AUTO: 9.8 10E9/L (ref 4–11)

## 2017-03-13 PROCEDURE — 36415 COLL VENOUS BLD VENIPUNCTURE: CPT | Performed by: INTERNAL MEDICINE

## 2017-03-13 PROCEDURE — 25000128 H RX IP 250 OP 636: Performed by: INTERNAL MEDICINE

## 2017-03-13 PROCEDURE — 25000132 ZZH RX MED GY IP 250 OP 250 PS 637: Performed by: INTERNAL MEDICINE

## 2017-03-13 PROCEDURE — 85027 COMPLETE CBC AUTOMATED: CPT | Performed by: INTERNAL MEDICINE

## 2017-03-13 PROCEDURE — 80048 BASIC METABOLIC PNL TOTAL CA: CPT | Performed by: INTERNAL MEDICINE

## 2017-03-13 PROCEDURE — 25000125 ZZHC RX 250: Performed by: INTERNAL MEDICINE

## 2017-03-13 PROCEDURE — 83735 ASSAY OF MAGNESIUM: CPT | Performed by: INTERNAL MEDICINE

## 2017-03-13 PROCEDURE — 80178 ASSAY OF LITHIUM: CPT | Performed by: INTERNAL MEDICINE

## 2017-03-13 PROCEDURE — 99232 SBSQ HOSP IP/OBS MODERATE 35: CPT | Performed by: PHYSICIAN ASSISTANT

## 2017-03-13 PROCEDURE — 25000128 H RX IP 250 OP 636: Performed by: PHYSICIAN ASSISTANT

## 2017-03-13 PROCEDURE — 12000001 ZZH R&B MED SURG/OB UMMC

## 2017-03-13 RX ORDER — LORAZEPAM 2 MG/ML
0.5 INJECTION INTRAMUSCULAR ONCE
Status: COMPLETED | OUTPATIENT
Start: 2017-03-13 | End: 2017-03-13

## 2017-03-13 RX ORDER — KETOROLAC TROMETHAMINE 30 MG/ML
30 INJECTION, SOLUTION INTRAMUSCULAR; INTRAVENOUS ONCE
Status: COMPLETED | OUTPATIENT
Start: 2017-03-13 | End: 2017-03-13

## 2017-03-13 RX ORDER — ONDANSETRON 4 MG/1
4-8 TABLET, ORALLY DISINTEGRATING ORAL EVERY 6 HOURS PRN
Status: DISCONTINUED | OUTPATIENT
Start: 2017-03-13 | End: 2017-03-14 | Stop reason: HOSPADM

## 2017-03-13 RX ORDER — ONDANSETRON 2 MG/ML
4-8 INJECTION INTRAMUSCULAR; INTRAVENOUS EVERY 6 HOURS PRN
Status: DISCONTINUED | OUTPATIENT
Start: 2017-03-13 | End: 2017-03-14 | Stop reason: HOSPADM

## 2017-03-13 RX ADMIN — LISINOPRIL 20 MG: 20 TABLET ORAL at 13:37

## 2017-03-13 RX ADMIN — ACETAMINOPHEN 650 MG: 325 TABLET, FILM COATED ORAL at 15:31

## 2017-03-13 RX ADMIN — CLONAZEPAM 2 MG: 0.5 TABLET ORAL at 21:29

## 2017-03-13 RX ADMIN — MIRTAZAPINE 15 MG: 15 TABLET, FILM COATED ORAL at 21:29

## 2017-03-13 RX ADMIN — Medication 1 G: at 13:31

## 2017-03-13 RX ADMIN — VANCOMYCIN HYDROCHLORIDE 125 MG: 100 INJECTION, POWDER, LYOPHILIZED, FOR SOLUTION INTRAVENOUS at 20:28

## 2017-03-13 RX ADMIN — HYDROXYZINE HYDROCHLORIDE 50 MG: 25 TABLET ORAL at 02:58

## 2017-03-13 RX ADMIN — FOLIC ACID 1 MG: 1 TABLET ORAL at 13:33

## 2017-03-13 RX ADMIN — HYDROXYZINE HYDROCHLORIDE 50 MG: 25 TABLET ORAL at 22:34

## 2017-03-13 RX ADMIN — CALCIUM CARBONATE (ANTACID) CHEW TAB 500 MG 1000 MG: 500 CHEW TAB at 00:35

## 2017-03-13 RX ADMIN — ONDANSETRON 4 MG: 4 TABLET, ORALLY DISINTEGRATING ORAL at 03:01

## 2017-03-13 RX ADMIN — HYDROXYZINE HYDROCHLORIDE 50 MG: 25 TABLET ORAL at 13:34

## 2017-03-13 RX ADMIN — LITHIUM CARBONATE 450 MG: 450 TABLET, EXTENDED RELEASE ORAL at 13:32

## 2017-03-13 RX ADMIN — LEVOTHYROXINE SODIUM 50 MCG: 50 TABLET ORAL at 13:32

## 2017-03-13 RX ADMIN — PANTOPRAZOLE SODIUM 40 MG: 40 TABLET, DELAYED RELEASE ORAL at 06:43

## 2017-03-13 RX ADMIN — VANCOMYCIN HYDROCHLORIDE 125 MG: 100 INJECTION, POWDER, LYOPHILIZED, FOR SOLUTION INTRAVENOUS at 13:31

## 2017-03-13 RX ADMIN — AMITRIPTYLINE HYDROCHLORIDE 10 MG: 10 TABLET, FILM COATED ORAL at 21:29

## 2017-03-13 RX ADMIN — Medication 1 G: at 06:43

## 2017-03-13 RX ADMIN — MULTIPLE VITAMINS W/ MINERALS TAB 1 TABLET: TAB at 13:33

## 2017-03-13 RX ADMIN — LORAZEPAM 0.5 MG: 2 INJECTION INTRAMUSCULAR; INTRAVENOUS at 12:19

## 2017-03-13 RX ADMIN — CLONAZEPAM 1 MG: 0.5 TABLET ORAL at 13:31

## 2017-03-13 RX ADMIN — ONDANSETRON 4 MG: 2 INJECTION INTRAMUSCULAR; INTRAVENOUS at 10:06

## 2017-03-13 RX ADMIN — KETOROLAC TROMETHAMINE 30 MG: 30 INJECTION, SOLUTION INTRAMUSCULAR at 12:04

## 2017-03-13 RX ADMIN — DESOGESTREL AND ETHINYL ESTRADIOL 1 TABLET: KIT at 13:41

## 2017-03-13 NOTE — PLAN OF CARE
Problem: Goal Outcome Summary  Goal: Goal Outcome Summary  Pt. A&Ox4. VSS. Afebrile. Lungs-CTA. Bowels-active. Pt c/o abdominal pain and nausea, given prn TUMS & zofran. Gave pt warm blanket for abdomen w/ some improvement in pain. PP+ DP+. CMS and neuro's are intact. Denies shortness of breath, and chest pain. Voids spontaneously without difficulty. Pt up with SBA. Bedside attendant present for SI, presently denies SI. MSSA score 3 & 4. PIV is SL. Bilateral heels are elevated off the bed. Pt able to make needs known. Will continue to monitor.
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
## 2017-03-14 VITALS
RESPIRATION RATE: 16 BRPM | OXYGEN SATURATION: 96 % | HEART RATE: 91 BPM | WEIGHT: 120.5 LBS | BODY MASS INDEX: 20.05 KG/M2 | TEMPERATURE: 96.8 F | DIASTOLIC BLOOD PRESSURE: 85 MMHG | SYSTOLIC BLOOD PRESSURE: 134 MMHG

## 2017-03-14 PROCEDURE — 25000132 ZZH RX MED GY IP 250 OP 250 PS 637: Performed by: INTERNAL MEDICINE

## 2017-03-14 PROCEDURE — 99231 SBSQ HOSP IP/OBS SF/LOW 25: CPT | Performed by: PSYCHIATRY & NEUROLOGY

## 2017-03-14 PROCEDURE — 99239 HOSP IP/OBS DSCHRG MGMT >30: CPT | Performed by: PHYSICIAN ASSISTANT

## 2017-03-14 PROCEDURE — 25000128 H RX IP 250 OP 636: Performed by: PHYSICIAN ASSISTANT

## 2017-03-14 RX ORDER — LISINOPRIL 20 MG/1
20 TABLET ORAL DAILY
Qty: 30 TABLET | Refills: 0 | Status: SHIPPED
Start: 2017-03-14 | End: 2017-08-10

## 2017-03-14 RX ADMIN — FOLIC ACID 1 MG: 1 TABLET ORAL at 10:07

## 2017-03-14 RX ADMIN — LEVOTHYROXINE SODIUM 50 MCG: 50 TABLET ORAL at 10:07

## 2017-03-14 RX ADMIN — VANCOMYCIN HYDROCHLORIDE 125 MG: 100 INJECTION, POWDER, LYOPHILIZED, FOR SOLUTION INTRAVENOUS at 13:03

## 2017-03-14 RX ADMIN — CLONAZEPAM 1 MG: 0.5 TABLET ORAL at 10:06

## 2017-03-14 RX ADMIN — LORAZEPAM 1 MG: 1 TABLET ORAL at 00:13

## 2017-03-14 RX ADMIN — ACETAMINOPHEN 650 MG: 325 TABLET, FILM COATED ORAL at 10:15

## 2017-03-14 RX ADMIN — MULTIPLE VITAMINS W/ MINERALS TAB 1 TABLET: TAB at 10:07

## 2017-03-14 RX ADMIN — ACETAMINOPHEN 650 MG: 325 TABLET, FILM COATED ORAL at 15:34

## 2017-03-14 RX ADMIN — LISINOPRIL 20 MG: 20 TABLET ORAL at 10:07

## 2017-03-14 RX ADMIN — ONDANSETRON 4 MG: 2 INJECTION INTRAMUSCULAR; INTRAVENOUS at 00:07

## 2017-03-14 RX ADMIN — LITHIUM CARBONATE 450 MG: 450 TABLET, EXTENDED RELEASE ORAL at 10:07

## 2017-03-14 RX ADMIN — VANCOMYCIN HYDROCHLORIDE 125 MG: 100 INJECTION, POWDER, LYOPHILIZED, FOR SOLUTION INTRAVENOUS at 10:15

## 2017-03-14 RX ADMIN — DESOGESTREL AND ETHINYL ESTRADIOL 1 TABLET: KIT at 10:15

## 2017-03-14 RX ADMIN — PANTOPRAZOLE SODIUM 40 MG: 40 TABLET, DELAYED RELEASE ORAL at 10:06

## 2017-03-14 RX ADMIN — VANCOMYCIN HYDROCHLORIDE 125 MG: 100 INJECTION, POWDER, LYOPHILIZED, FOR SOLUTION INTRAVENOUS at 17:32

## 2017-03-14 RX ADMIN — HYDROXYZINE HYDROCHLORIDE 50 MG: 25 TABLET ORAL at 13:19

## 2017-03-14 NOTE — PLAN OF CARE
Problem: Goal Outcome Summary  Goal: Goal Outcome Summary  Outcome: No Change  Alert and oriented X 4. Able to make needs known.  1:1 sitter at bedside for suicide precautions. No suicidal ideations on this shift. C/O chills, nausea,and abdominal discomfort. MSSA score 5-9. Received Ativan 1 mg po X 1 at beginning of night  Restless and slightly irritable. Also received Zofran 4 mg IVP X 1 .  PIV patent, saline locked.  Repositions self in bed. Up to BR with SBA. Sitter reports that patient has been sleeping most of night   Enteric Iso precautions maintained.  Continue to monitor.

## 2017-03-14 NOTE — PLAN OF CARE
Problem: Goal Outcome Summary  Goal: Goal Outcome Summary  Outcome: No Change  [id number]: Patient A/Ox4. VSS. Denies CP, SOB, dizziness/LH. Declined ascultation, pt wanted to eat box lunch and have bedtime medications. Voiding well in bathroom.  MSSA score of 5. CMS intact. Tolerating regular diet fairly without NV.  Activity level has been good, pt ambulated in room. IV is SL and patent. Pain rated as negligible throughout shift.  1:1 sitter in place.  Ordered new pants for pt, she had 2XL or so on. Patient has demonstrated ability to call appropriately. Patient is resting with call light within reach. Will continue to monitor.

## 2017-03-14 NOTE — PLAN OF CARE
Problem: Goal Outcome Summary  Goal: Goal Outcome Summary  Outcome: Improving  A/Ox's 4.neuro and CMS intact.tolerated regular diet eating small amount . Complaining some nausea this morning no emesis noted , medicated with Zofran iv with relief, denies CP, SOB, lightheadedness or dizziness. Voiding without pain or difficulty. Passing flatus. Up with  SBA Encouraged increased/continued IS use,Resting in bed at this time with call light in reach. Able to make needs known. Continue to monitor.
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
## 2017-03-14 NOTE — PLAN OF CARE
Problem: Goal Outcome Summary  Goal: Goal Outcome Summary  Outcome: Improving  A/Ox's 4. Neuro and CMS intact. Tolerated regular diet. Denied any nausea, CP, SOB, lightheadedness or dizziness. Voiding without pain or difficulty. Up independent in the room Encouraged increased/continued IS use, MSSA score was 4, and 7 checked x 2, no medication given for the score, bedside attendant in the room Resting in bed at this time with call light in reach. Able to make needs known. Continue to monitor.

## 2017-03-14 NOTE — PROGRESS NOTES
Social Work Services Progress Note        Data: SW following for mental health needs.  Intervention: Received call from pt's child protection , Tucker Henry ([phone]).  He wanted to visit pt today and asked if she would still be here.  Reported it was likely that she would be here.  Plan is for discharge either to home vs inpatient psych depending upon recommendation from psych who is consulted to follow up with pt.    Plan:    Discharge Plans in Progress: Home vs Inpatient Psych.    Barriers to d/c plan: If pt is not cleared to return home by psych, she will need to be medically cleared from c-dif before transfer.    Follow up Plan: SW following for discharge plan.      MARGARITA De  [phone]
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
## 2017-03-15 NOTE — PROGRESS NOTES
Pt safe to DC home per Dr. Mccann and Dr. Lopez.Discharge instructions given to patient and verbalized understanding.home meds given to patient from security.Pt discharged with PIV still in place.Called pt's cellphone,  Message left for pt to come back in so IV can be removed.    Update: 3/15/17  Pt called the unit @ 0377 informing staff that pt pulled out PIV.
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
## 2017-03-22 ENCOUNTER — MYC MEDICAL ADVICE (OUTPATIENT)
Dept: PSYCHIATRY | Facility: CLINIC | Age: 47
End: 2017-03-22

## 2017-03-22 DIAGNOSIS — F31.9 BIPOLAR DISORDER (H): Primary | ICD-10-CM

## 2017-03-22 DIAGNOSIS — F32.A DEPRESSION: ICD-10-CM

## 2017-03-22 DIAGNOSIS — F31.9 BIPOLAR DISORDER (H): ICD-10-CM

## 2017-03-22 DIAGNOSIS — F31.9 BIPOLAR DEPRESSION (H): Primary | ICD-10-CM

## 2017-03-22 RX ORDER — AMITRIPTYLINE HYDROCHLORIDE 10 MG/1
10 TABLET ORAL AT BEDTIME
Qty: 90 TABLET | Refills: 0 | Status: SHIPPED | OUTPATIENT
Start: 2017-03-22 | End: 2017-06-20

## 2017-03-22 RX ORDER — MIRTAZAPINE 15 MG/1
15 TABLET, FILM COATED ORAL AT BEDTIME
Qty: 90 TABLET | Refills: 0 | Status: SHIPPED | OUTPATIENT
Start: 2017-03-22 | End: 2017-08-10

## 2017-03-22 RX ORDER — LITHIUM CARBONATE 450 MG
450 TABLET, EXTENDED RELEASE ORAL DAILY
Qty: 90 TABLET | Refills: 0 | Status: SHIPPED | OUTPATIENT
Start: 2017-03-22 | End: 2017-06-22

## 2017-03-22 RX ORDER — CLONAZEPAM 1 MG/1
1 TABLET ORAL 3 TIMES DAILY PRN
Qty: 270 TABLET | Refills: 0
Start: 2017-03-22 | End: 2017-06-20

## 2017-03-22 NOTE — TELEPHONE ENCOUNTER
Jaden Rodriguez MD  Flsarah, May SEYMOUR RN       Caller: Unspecified (Today, 12:59 AM)       Phone Number: [phone]                     OK to fill all 3 for 90 days

## 2017-03-22 NOTE — TELEPHONE ENCOUNTER
Routed to Dr. Rodriguez for authorization to RF 90 d/s of the followin.  Lithium  mg q day  2.  Remeron 15 mg q HS  3.  Clonazepam 1 mg TID (last RF per [place]  16, 90 d/s)

## 2017-03-23 ENCOUNTER — CARE COORDINATION (OUTPATIENT)
Dept: GASTROENTEROLOGY | Facility: CLINIC | Age: 47
End: 2017-03-23

## 2017-03-23 ENCOUNTER — TELEPHONE (OUTPATIENT)
Dept: GASTROENTEROLOGY | Facility: CLINIC | Age: 47
End: 2017-03-23

## 2017-03-23 DIAGNOSIS — A04.72 C. DIFFICILE COLITIS: Primary | ICD-10-CM

## 2017-03-23 NOTE — PROGRESS NOTES
Pt was transferred from triage line.  Was hospitalized and diagnosed with c diff.  On vancomycin taper and taking 2 times a day.  Still having diarrhea.  Called and talked to Dr. Dennis. Order for fidaxomicin and  Standing c diff study so if diarrhea after the dosage of fidaxomicin will do another c diff study.  Pt said she has not had any antibiotics but iv.  Let her know that should take the fidaxomcin and then if reoccurs to contact us.      Called by patient as she is having diarrhea on vancomycin. Was diagnosed with C. Diff on 3/9/17. Patient had antibiotics for cystitis at end of January. While she did get vancomycin prophylaxis, she still recurred.      Will change her to fidaxomicin x 10 days at this point. As she has not yet had a spontaneous recurrence we will not pursue FMT at this time. If she does have a spontaneous recurrence, then would set her up for FMT.      Recommend avoiding antibiotics as able. For UTIs recommend parenteral aminoglycosides x 3 days as this will leave the intestinal microbiota relatively undisturbed.

## 2017-03-23 NOTE — TELEPHONE ENCOUNTER
Called by patient as she is having diarrhea on vancomycin.  Was diagnosed with C. Diff on 3/9/17.  Patient had antibiotics for cystitis at end of January. While she did get vancomycin prophylaxis, she still recurred.       Will change her to fidaxomicin x 10 days at this point.  As she has not yet had a spontaneous recurrence we will not pursue FMT at this time.  If she does have a spontaneous recurrence, then would set her up for FMT.     Recommend avoiding antibiotics as able.  For UTIs recommend parenteral aminoglycosides x 3 days as this will leave the intestinal microbiota relatively undisturbed.

## 2017-03-23 NOTE — TELEPHONE ENCOUNTER
Edith Ann Michelle, KISHA Esteves at [facility].   Ph: [phone]     The authorization OK for Clonazepam was given to someone who couldn't take it yesterday so she needs to get the verbal auth again today. The number above is [facility]'s direct line.              Returned call to  Pharmacy and spoke with Shiva- pharmacist.   Shiva reports to have only needed the name of the nurse that provided authorization yesterday as this was missed during the call.  Provided May BEARD's name.

## 2017-03-27 ENCOUNTER — TELEPHONE (OUTPATIENT)
Dept: PSYCHIATRY | Facility: CLINIC | Age: 47
End: 2017-03-27

## 2017-03-27 DIAGNOSIS — F43.20 ADJUSTMENT DISORDER: Primary | ICD-10-CM

## 2017-03-27 DIAGNOSIS — F41.9 ANXIETY DISORDER: ICD-10-CM

## 2017-03-27 RX ORDER — HYDROXYZINE HYDROCHLORIDE 50 MG/1
50 TABLET, FILM COATED ORAL 3 TIMES DAILY PRN
Qty: 270 TABLET | Refills: 0 | Status: SHIPPED | OUTPATIENT
Start: 2017-03-27 | End: 2017-10-18

## 2017-03-27 NOTE — TELEPHONE ENCOUNTER
Medication Requested: Hydroxyzine 50 mg tabs  Last Written RX Date: discontinued from medication list when pt was in the hospital  Last Pharmacy Fill Date: 9-29-16  Last RX Quantity: 540,   # refills: 0    Last Office Visit: 2-10-17  Recommended RTC: not indicated  Next Scheduled Office Visit: 4-10-17    Since last Visit: # of CX 0 ,# of NOS 0    Last Visit Recommendations for:  Medications: no changes  Labs: 0    Discharge summary written 3-14-17 dose not have hydroxyzine on the discharge medication list.  Pt is requesting a 90 day supply.    Will route to provider.       Kathleen M Doege RN

## 2017-05-17 ENCOUNTER — CARE COORDINATION (OUTPATIENT)
Dept: PSYCHIATRY | Facility: CLINIC | Age: 47
End: 2017-05-17

## 2017-05-17 NOTE — PROGRESS NOTES
Karina Heller Michelle, RN        Phone Number: [phone]                     Michael/Katheryn King/Consult liaison therapist   Pt is in [facility] would like to talk about coordinated care and an urgent appt. with Michael.   Number above is a pager number.       Paged Rosa King however there is no MIGUE on file for [facility].    Rec'd immediate c/b from Rosa who reports to be a liaison psychiatrist at [facility].  Rosa states that pt was admitted to hospital on 5/13/17 for MH status changes and dehydration.  Pt had developed acute pancreatitis secondary to alcohol use and they also suspect that she was manic as had been traveling around the .  Pt's lithium was elevated at 1.31 so her lithium dose was decreased to 300 mg.  Pt is doing better however still has electrolyte abnormalities.  Will likely be discharged in 24 hours.  Rosa is hoping to have her scheduled for an urgent appt with Dr. Rodriguez and would like to assist with coordinating this.  Writer requested to have an MIGUE faxed to clinic, Rosa stated that they will do this.  Pt has signed for care everywhere at their clinic and gave Rosa verbal authorization to discuss with Dr. Rodriguez. Triage fax number passed along.    Routed to Dr. Rodriguez

## 2017-05-17 NOTE — PROGRESS NOTES
Jaden Rodriguez MD Bove, Michelle, RN        Caller: Unspecified (Today, 11:46 AM)                     This Friday 5/19 at 12:30       MIGUE rec'd for phone contact between Dr. Rodriguez and Dr. King.  Copy of MIGUE sent to scanning, copy temporarily retained.    Paged Dr. King and rec'd prompt c/b.  Passed along appt availability per Dr. Rodriguez.  Dr. King to inform pt of this appt and believes that there should be no barriers to attend this.    Routed to Dr. Rodriguez as CHADD

## 2017-06-04 ENCOUNTER — TRANSFERRED RECORDS (OUTPATIENT)
Dept: HEALTH INFORMATION MANAGEMENT | Facility: CLINIC | Age: 47
End: 2017-06-04

## 2017-06-09 ENCOUNTER — TELEPHONE (OUTPATIENT)
Dept: PSYCHIATRY | Facility: CLINIC | Age: 47
End: 2017-06-09

## 2017-06-09 NOTE — TELEPHONE ENCOUNTER
Received RF request from [facility] for:    clonazePAM (KLONOPIN) 1 MG tablet 270 tablet 0 3/22/2017  --   Sig: Take 1 tablet (1 mg) by mouth 3 times daily as needed for anxiety     Last RF:  Per pharmacy:  3/23/17  Per [place] :  3/23/17 (90 d/s)    Due for RF:  Not until 6/21/17    Appointment History:  Last appt: 2/10/17  RTC: not specified  Cancel: none  No-show: 4/10/17, 5/19/17  Next appt: 6/20/17    Will route to Dr. Olmedo, who is covering for Dr. Rodriguez.

## 2017-06-10 ENCOUNTER — HEALTH MAINTENANCE LETTER (OUTPATIENT)
Age: 47
End: 2017-06-10

## 2017-06-13 NOTE — TELEPHONE ENCOUNTER
"Since she has missed a couple of appointments, I think she needs to actually make it to her appointment on the 20th. If she has enough medication to last her until then, she can get the prescription filled at that time. It's not clear why she needs it filled now--she can get the Rx filled the day of her appointment, or if Dr. Rodriguez wants to help \"expedite the process\" he can refill when he gets back which is still well before her appointment.  "

## 2017-06-13 NOTE — TELEPHONE ENCOUNTER
Received a return call from the patient who stated that she will be permanently moving to [place] on 6/21/17, and will actually be flying back for the appointment with Dr. Rodriguez on 6/20/17.  She stated that she is taking Klonopin 1 mg TID, and finds it really helpful with sleep and the stressors that she is currently dealing with.  She is moving to another state, starting a new job, getting a divorce, and was in the hospital recently.  She acknowledged that it is an early refill, but just wants to expedite the process so that she has it ready before she leaves.    Will route to covering provider for authorization.

## 2017-06-13 NOTE — TELEPHONE ENCOUNTER
Received another request for clonazepam from [facility].      Called patient and left VM asking for a return call.  Need to clarify how the patient is taking the medication since she is asking for an early refill.  Based on the last refill for a 90 d/s, she should have enough until her next appointment on 6/20/17.    Per [place] , patient has sporadic prescriptions for Dilaudid, Midrin, Vicodin, Percocet, Tramadol, and Ativan from other providers ranging from one to several days.    Will route to Dr. Joseph, who is the covering provider for Dr. Rodriguez, for recommendation.

## 2017-06-14 NOTE — TELEPHONE ENCOUNTER
Called and spoke with patient and explained that the covering provider wanted to defer the early refill to Dr. Rodriguez.  If refill is approved, the prescription can be walked over to the [facility] and they can get it ready for her to  on the day of her appointment.  She agreed with that plan.

## 2017-06-20 ENCOUNTER — TELEPHONE (OUTPATIENT)
Dept: PSYCHIATRY | Facility: CLINIC | Age: 47
End: 2017-06-20

## 2017-06-20 ENCOUNTER — OFFICE VISIT (OUTPATIENT)
Dept: PSYCHIATRY | Facility: CLINIC | Age: 47
End: 2017-06-20
Attending: PSYCHIATRY & NEUROLOGY
Payer: COMMERCIAL

## 2017-06-20 DIAGNOSIS — F31.0 BIPOLAR AFFECTIVE DISORDER, CURRENT EPISODE HYPOMANIC (H): ICD-10-CM

## 2017-06-20 DIAGNOSIS — F31.9 BIPOLAR DISORDER (H): Primary | ICD-10-CM

## 2017-06-20 DIAGNOSIS — F31.9 BIPOLAR DISORDER (H): ICD-10-CM

## 2017-06-20 LAB — LITHIUM SERPL-SCNC: 1.2 MMOL/L (ref 0.6–1.2)

## 2017-06-20 PROCEDURE — 36415 COLL VENOUS BLD VENIPUNCTURE: CPT | Performed by: PSYCHIATRY & NEUROLOGY

## 2017-06-20 PROCEDURE — 80178 ASSAY OF LITHIUM: CPT | Performed by: PSYCHIATRY & NEUROLOGY

## 2017-06-20 RX ORDER — CLONAZEPAM 1 MG/1
1 TABLET ORAL 3 TIMES DAILY PRN
Qty: 270 TABLET | Refills: 0 | Status: SHIPPED | OUTPATIENT
Start: 2017-06-20 | End: 2017-10-18

## 2017-06-20 RX ORDER — AMITRIPTYLINE HYDROCHLORIDE 10 MG/1
10 TABLET ORAL AT BEDTIME
Qty: 90 TABLET | Refills: 0 | Status: SHIPPED | OUTPATIENT
Start: 2017-06-20 | End: 2017-08-10

## 2017-06-20 NOTE — TELEPHONE ENCOUNTER
----- Message from Edith Ann sent at 6/20/2017  9:10 AM CDT -----  Regarding: Message/Michael  Contact: [phone]  The pt is caller. States she took her meds this morning but is thinking Dr. Rodriguez will still want her to get her lithium levels done.  Wondering if he could put in an order for her to stop at the lab before her appt. Ok to LVM.

## 2017-06-20 NOTE — TELEPHONE ENCOUNTER
Dr. Rodriguez authorizes orders.    Spoke with pt who is aware that orders have been submitted.  Plans to obtain prior to appt.

## 2017-06-20 NOTE — MR AVS SNAPSHOT
After Visit Summary   6/20/2017    Ana Laura Wharton    MRN: [id number]           Patient Information     Date Of Birth          1970        Visit Information        Provider Department      6/20/2017 12:30 PM Jaden Rodriguez MD Psychiatry Clinic        Today's Diagnoses     Bipolar affective disorder, current episode hypomanic (H)           Follow-ups after your visit        Your next 10 appointments already scheduled     Jul 17, 2017 12:30 PM CDT   Adult Med Follow UP with Jaden Rodriguez MD   Psychiatry Clinic ([facility])    [address]  [address] [facility] [address] [phone]              Who to contact     Please call your clinic at [phone] to:    Ask questions about your health    Make or cancel appointments    Discuss your medicines    Learn about your test results    Speak to your doctor   If you have compliments or concerns about an experience at your clinic, or if you wish to file a complaint, please contact [facility] Patient Relations at [phone] or email us at [email or web address]         Additional Information About Your Visit        MyChart Information     "[employer]t gives you secure access to your electronic health record. If you see a primary care provider, you can also send messages to your care team and make appointments. If you have questions, please call your primary care clinic.  If you do not have a primary care provider, please call [phone] and they will assist you.      [employer] is an electronic gateway that provides easy, online access to your medical records. With [employer], you can request a clinic appointment, read your test results, renew a prescription or communicate with your care team.     To access your existing account, please contact your [facility] or call [phone] for assistance.        Care EveryWhere ID     This is your  Care EveryWhere ID. This could be used by other organizations to access your [place] medical records  [id number]         Blood Pressure from Last 3 Encounters:   03/14/17 134/85   03/09/17 (!) 160/92   02/10/17 (!) 78/49    Weight from Last 3 Encounters:   03/11/17 54.7 kg (120 lb 8 oz)   03/09/17 52.6 kg (116 lb)   02/10/17 56.8 kg (125 lb 3.2 oz)              Today, you had the following     No orders found for display         Where to get your medicines      These medications were sent to [id number] - [place] - [address]  [address], [place] [address]     Phone:  [phone]     amitriptyline 10 MG tablet         Some of these will need a paper prescription and others can be bought over the counter.  Ask your nurse if you have questions.     Bring a paper prescription for each of these medications     clonazePAM 1 MG tablet          Primary Care Provider Office Phone # Fax #    Ander Fuchs [phone]       [facility] [place] [address]  [place] [address]        Equal Access to Services     OLGA LUTZ AH: Hadii aad ku hadasho Soomaali, waaxda luqadaha, qaybta kaalmada adeegyada, waxay idiin hayaan jeanie oscar . So [facility] [phone].    ATENCIÓN: Si habla español, tiene a gold disposición servicios gratuitos de asistencia lingüística. Llame al [phone].    We comply with applicable federal civil rights laws and [place] laws. We do not discriminate on the basis of race, color, national origin, age, disability sex, sexual orientation or gender identity.            Thank you!     Thank you for choosing PSYCHIATRY CLINIC  for your care. Our goal is always to provide you with excellent care. Hearing back from our patients is one way we can continue to improve our services. Please take a few minutes to complete the written survey that you may receive in the mail after your visit with us. Thank you!              Your Updated Medication List - Protect others around you: Learn how to safely use, store and throw away your medicines at www.disposemymeds.org.          This list is accurate as of: 6/20/17 11:59 PM.  Always use your most recent med list.                   Brand Name Dispense Instructions for use Diagnosis    amitriptyline 10 MG tablet    ELAVIL    90 tablet    Take 1 tablet (10 mg) by mouth At Bedtime    Bipolar affective disorder, current episode hypomanic (H)       clindamycin 1 % lotion    CLEOCIN T     Apply topically every morning        clonazePAM 1 MG tablet    klonoPIN    270 tablet    Take 1 tablet (1 mg) by mouth 3 times daily as needed for anxiety    Bipolar affective disorder, current episode hypomanic (H)       desogestrel-ethinyl estradiol 0.15-30 MG-MCG per tablet    APRI     Take 1 tablet by mouth daily        * HYDROXYZINE HCL PO      Take 100 mg by mouth daily as needed        * hydrOXYzine 50 MG tablet    ATARAX    270 tablet    Take 1 tablet (50 mg) by mouth 3 times daily as needed for itching    Anxiety disorder       IBUPROFEN PO      Take 400-600 mg by mouth every 6 hours as needed for moderate pain        lisinopril 20 MG tablet    PRINIVIL/ZESTRIL    30 tablet    Take 1 tablet (20 mg) by mouth daily    Essential hypertension       mirtazapine 15 MG tablet    REMERON    90 tablet    Take 1 tablet (15 mg) by mouth At Bedtime    Bipolar disorder (H), Depression       PROTONIX PO      Take 40 mg by mouth every morning (before breakfast)        RELPAX PO      Take 20 mg by mouth at onset of headache for migraine (May repeat in 2 hours if needed)        SYNTHROID PO      Take 50 mcg by mouth daily        TIZANIDINE HCL PO      Take 4 mg by mouth At Bedtime        TORADOL IM      Inject 30 mg into the muscle daily as needed Per pt for migraines        triamcinolone 0.1 % cream    KENALOG     Apply topically 2 times daily as needed for irritation        TYLENOL 8 HOUR PO      Take 1,000  mg by mouth every 6 hours as needed (pain)        vancomycin 50 mg/mL Liqd solution    VANOCIN    205 mL    Take by mouth as directed:  125mg QID x 10 days, then 125mg TID x 7 days, then 125mg BID x 7 days, then 125mg daily x 7 days.    Clostridium difficile colitis       ZOFRAN ODT PO      Take 4 mg by mouth every 8 hours as needed for nausea        * Notice:  This list has 2 medication(s) that are the same as other medications prescribed for you. Read the directions carefully, and ask your doctor or other care provider to review them with you.

## 2017-06-22 ENCOUNTER — TELEPHONE (OUTPATIENT)
Dept: PSYCHIATRY | Facility: CLINIC | Age: 47
End: 2017-06-22

## 2017-06-22 DIAGNOSIS — F31.9 BIPOLAR DISORDER (H): ICD-10-CM

## 2017-06-22 RX ORDER — LITHIUM CARBONATE 300 MG/1
300 TABLET, FILM COATED, EXTENDED RELEASE ORAL DAILY
Qty: 30 TABLET | Refills: 0 | COMMUNITY
Start: 2017-06-22 | End: 2017-08-10

## 2017-06-22 NOTE — TELEPHONE ENCOUNTER
Last appt: 6/20/17    Lithium Level 1.2  0.6 - 1.2 mmol/L Final 06/20/2017 12:31 PM 13     Routed to Dr. Rodriguez to see if there are any recommendations to go along with lab results.  Of note, pt took her lithium as prescribed the morning prior to blood draw.

## 2017-06-22 NOTE — TELEPHONE ENCOUNTER
"Jaden Rodriguez MD Bove, Michelle RN        Caller: Unspecified (Today,  9:48 AM)                     It depends on if she is having any current toxicity symptoms.  If she is not, I would leave the dose unchanged since it is not a trough level.  If she is having toxicity symptoms, would change dose to 300mg every other day.       Returned call to pt to discuss.  Pt reports that she was eventually able to review her lithium results, recalled that the level was 1.2 which is \"the higher end of normal\".  Passed along msg per Dr. Rodriguez.  Pt denies any sx of lithium toxicity, states \"not right now\". Mentions that she is very familiar with s/sx due to recent hospitalization for lithium toxicity at [facility].  Pt reports that because of this, she is not interested in increasing her lithium.  Is currently taking 300 mg QD.  Writer encouraged pt to contact clinic if she does begin to develop toxicity sx.    Also mentions that she is currently disorganized as has been living at a hotel.  Reports that she was supposed to have been in contact with Dr. Rodriguez about rescheduling in another month.  Requests a msg to be sent to Dr. Rodriguez and reports that she is available for any time.  Would like to be updated via [facility] with appt.    "

## 2017-06-22 NOTE — TELEPHONE ENCOUNTER
----- Message from Mary Jane Craig sent at 6/22/2017  8:28 AM CDT -----  Contact: [phone]  Michael/Katheryn    Patient is caller. She says that she has a my chart message and is not able to access her my chart to look at it. She thinks that it is the lab results, so she is wondering if some could let her know what the lab results are.

## 2017-06-26 NOTE — PROGRESS NOTES
"Kee filed for divorce, reconciled and moved back and then things fell apart again.  Moved out again.  Had a bout of drinking.  Hospitalized twice at [facility].  Acute pancreatitis.  Stopped drinking.  Was also lithium toxic after lisinopril.  Mood is currently numb.  Has had hypomania recently.  Sleep is disrupted.  Appetite is very poor due to nausea.  Concentration is poor.  Energy is poor.  No ability to experience pleasure.  No homicidal ideation.  Passive suicidal last week but not currently.  No flashbacks.  No auditory or visual hallucinations.  No paranoia or ideas of reference.  Anxiety is very elevated - driven by circumstances.  No panic attacks.  No OCD.  No longer any alcohol use.  No substance use.      MENTAL STATUS EXAMINATION:  Kee was cooperative.  She had good grooming.  Affect was congruent.  Normal movements.  No psychosis.  No homicidal ideation.  Good recent recall.  Good eye contact.  Mood is \"numb\".  Speech rate is normal.  Thought process is logical.  No current suicidal ideation.  Orientation x 4.      CURRENT MEDICATIONS:   1.  Li  mg   2. Remeron 15 mg   3. Klonopin 1 mg TID prn   4. Elavil 10 mg   5. Hydroxyzine 50 mg TID prn      ASSESSMENT:  BPAD - under severe stress.  Not too much mood instability.      PLAN:  Check lithium level.  No med changes.  Follow up in 1 month.      This was a 30 minute face-to-face encounter of which >50% of the time was spent on providing support for chronic illness and dealing with acute stressors.         PIEDAD SADLER MD             D: 2017 08:03   T: 2017 08:14   MT: LUIS      Name:     KEE TINEO   MRN:      [id number]        Account:      [id number]   :      1970           Service Date: 2017      Document: [id number]    "

## 2017-07-14 ENCOUNTER — TELEPHONE (OUTPATIENT)
Dept: PSYCHIATRY | Facility: CLINIC | Age: 47
End: 2017-07-14

## 2017-07-14 NOTE — TELEPHONE ENCOUNTER
Writer located incoming records, faxed from [facility] to the psychiatry department fax on 6/6/17.  Records include discharge summary from admission at [facility] from 6/4/17- 6/6/17 and were located in Dr. Rodriguez's clinic mail file.    Pt has been seen prior to fax from [facility].    Records sent to scanning.

## 2017-07-17 ENCOUNTER — OFFICE VISIT (OUTPATIENT)
Dept: PSYCHIATRY | Facility: CLINIC | Age: 47
End: 2017-07-17
Attending: PSYCHIATRY & NEUROLOGY
Payer: MEDICARE

## 2017-07-17 DIAGNOSIS — F31.9 BIPOLAR I DISORDER (H): Primary | ICD-10-CM

## 2017-07-17 NOTE — MR AVS SNAPSHOT
After Visit Summary   7/17/2017    Ana Laura Wharton    MRN: [id number]           Patient Information     Date Of Birth          1970        Visit Information        Provider Department      7/17/2017 12:30 PM Jaden Rodriguez MD Psychiatry Clinic        Today's Diagnoses     Bipolar I disorder (H)    -  1       Follow-ups after your visit        Who to contact     Please call your clinic at [phone] to:    Ask questions about your health    Make or cancel appointments    Discuss your medicines    Learn about your test results    Speak to your doctor   If you have compliments or concerns about an experience at your clinic, or if you wish to file a complaint, please contact [facility] Patient Relations at [phone] or email us at [email or web address]         Additional Information About Your Visit        MyChart Information     [employer] gives you secure access to your electronic health record. If you see a primary care provider, you can also send messages to your care team and make appointments. If you have questions, please call your primary care clinic.  If you do not have a primary care provider, please call [phone] and they will assist you.      [employer] is an electronic gateway that provides easy, online access to your medical records. With [employer], you can request a clinic appointment, read your test results, renew a prescription or communicate with your care team.     To access your existing account, please contact your [facility] or call [phone] for assistance.        Care EveryWhere ID     This is your Care EveryWhere ID. This could be used by other organizations to access your [place] medical records  [id number]         Blood Pressure from Last 3 Encounters:   03/14/17 134/85   03/09/17 (!) 160/92   02/10/17 (!) 78/49    Weight from Last 3 Encounters:   03/11/17 54.7 kg (120 lb 8 oz)   03/09/17 52.6 kg  (116 lb)   02/10/17 56.8 kg (125 lb 3.2 oz)              Today, you had the following     No orders found for display       Primary Care Provider Office Phone # Fax #    Ander Fuchs [phone]       [facility] [place] [address]  [place] [address]        Equal Access to Services     [facility]: Hadii aad ku hadasho Soomaali, waaxda luqadaha, qaybta kaalmada adeegyada, waxay idiin hayaan adeeg kharash la'aan . So [facility] [phone].    ATENCIÓN: Si habla espricardo, tiene a gold disposición servicios gratuitos de asistencia lingüística. Goraname al [phone].    We comply with applicable federal civil rights laws and [place] laws. We do not discriminate on the basis of race, color, national origin, age, disability sex, sexual orientation or gender identity.            Thank you!     Thank you for choosing PSYCHIATRY CLINIC  for your care. Our goal is always to provide you with excellent care. Hearing back from our patients is one way we can continue to improve our services. Please take a few minutes to complete the written survey that you may receive in the mail after your visit with us. Thank you!             Your Updated Medication List - Protect others around you: Learn how to safely use, store and throw away your medicines at www.disposemymeds.org.          This list is accurate as of: 7/17/17 11:59 PM.  Always use your most recent med list.                   Brand Name Dispense Instructions for use Diagnosis    amitriptyline 10 MG tablet    ELAVIL    90 tablet    Take 1 tablet (10 mg) by mouth At Bedtime    Bipolar affective disorder, current episode hypomanic (H)       clindamycin 1 % lotion    CLEOCIN T     Apply topically every morning        clonazePAM 1 MG tablet    klonoPIN    270 tablet    Take 1 tablet (1 mg) by mouth 3 times daily as needed for anxiety    Bipolar affective disorder, current episode hypomanic (H)       desogestrel-ethinyl estradiol 0.15-30 MG-MCG per  tablet    APRI     Take 1 tablet by mouth daily        * HYDROXYZINE HCL PO      Take 100 mg by mouth daily as needed        * hydrOXYzine 50 MG tablet    ATARAX    270 tablet    Take 1 tablet (50 mg) by mouth 3 times daily as needed for itching    Anxiety disorder       IBUPROFEN PO      Take 400-600 mg by mouth every 6 hours as needed for moderate pain        lisinopril 20 MG tablet    PRINIVIL/ZESTRIL    30 tablet    Take 1 tablet (20 mg) by mouth daily    Essential hypertension       lithium 300 MG CR tablet    ESKALITH/LITHOBID    30 tablet    Take 1 tablet (300 mg) by mouth daily    Bipolar disorder (H)       mirtazapine 15 MG tablet    REMERON    90 tablet    Take 1 tablet (15 mg) by mouth At Bedtime    Bipolar disorder (H), Depression       PROTONIX PO      Take 40 mg by mouth every morning (before breakfast)        RELPAX PO      Take 20 mg by mouth at onset of headache for migraine (May repeat in 2 hours if needed)        SYNTHROID PO      Take 50 mcg by mouth daily        TIZANIDINE HCL PO      Take 4 mg by mouth At Bedtime        TORADOL IM      Inject 30 mg into the muscle daily as needed Per pt for migraines        triamcinolone 0.1 % cream    KENALOG     Apply topically 2 times daily as needed for irritation        TYLENOL 8 HOUR PO      Take 1,000 mg by mouth every 6 hours as needed (pain)        vancomycin 50 mg/mL Liqd solution    VANOCIN    205 mL    Take by mouth as directed:  125mg QID x 10 days, then 125mg TID x 7 days, then 125mg BID x 7 days, then 125mg daily x 7 days.    Clostridium difficile colitis       ZOFRAN ODT PO      Take 4 mg by mouth every 8 hours as needed for nausea        * Notice:  This list has 2 medication(s) that are the same as other medications prescribed for you. Read the directions carefully, and ask your doctor or other care provider to review them with you.

## 2017-08-10 ENCOUNTER — OFFICE VISIT (OUTPATIENT)
Dept: PSYCHIATRY | Facility: CLINIC | Age: 47
End: 2017-08-10
Attending: PSYCHIATRY & NEUROLOGY
Payer: MEDICARE

## 2017-08-10 DIAGNOSIS — F31.9 BIPOLAR I DISORDER (H): Primary | ICD-10-CM

## 2017-08-10 RX ORDER — OLANZAPINE 5 MG/1
5 TABLET ORAL 3 TIMES DAILY PRN
Qty: 60 TABLET | Refills: 1 | Status: SHIPPED | OUTPATIENT
Start: 2017-08-10 | End: 2017-10-18

## 2017-08-10 NOTE — MR AVS SNAPSHOT
After Visit Summary   8/10/2017    Ana Laura Wharton    MRN: [id number]           Patient Information     Date Of Birth          1970        Visit Information        Provider Department      8/10/2017 4:30 PM Jaden Rodriguez MD [facility]        Today's Diagnoses     Bipolar I disorder (H)    -  1       Follow-ups after your visit        Who to contact     Please call your clinic at [phone] to:    Ask questions about your health    Make or cancel appointments    Discuss your medicines    Learn about your test results    Speak to your doctor   If you have compliments or concerns about an experience at your clinic, or if you wish to file a complaint, please contact [facility] Patient Relations at [phone] or email us at [email or web address]         Additional Information About Your Visit        MyChart Information     ShelfFlipt gives you secure access to your electronic health record. If you see a primary care provider, you can also send messages to your care team and make appointments. If you have questions, please call your primary care clinic.  If you do not have a primary care provider, please call [phone] and they will assist you.      Looklet is an electronic gateway that provides easy, online access to your medical records. With Looklet, you can request a clinic appointment, read your test results, renew a prescription or communicate with your care team.     To access your existing account, please contact your [facility] or call [phone] for assistance.        Care EveryWhere ID     This is your Care EveryWhere ID. This could be used by other organizations to access your [place] medical records  [id number]         Blood Pressure from Last 3 Encounters:   03/14/17 134/85   03/09/17 (!) 160/92   02/10/17 (!) 78/49    Weight from Last 3 Encounters:   03/11/17 54.7 kg (120 lb 8 oz)   03/09/17 52.6 kg  (116 lb)   02/10/17 56.8 kg (125 lb 3.2 oz)              Today, you had the following     No orders found for display         Today's Medication Changes          These changes are accurate as of: 8/10/17 11:59 PM.  If you have any questions, ask your nurse or doctor.               Start taking these medicines.        Dose/Directions    OLANZapine 5 MG tablet   Commonly known as:  zyPREXA   Used for:  Bipolar I disorder (H)        Dose:  5 mg   Take 1 tablet (5 mg) by mouth 3 times daily as needed For paranoia, racing thoughts or suicide thoughts.   Quantity:  60 tablet   Refills:  1         Stop taking these medicines if you haven't already. Please contact your care team if you have questions.     amitriptyline 10 MG tablet   Commonly known as:  ELAVIL           lisinopril 20 MG tablet   Commonly known as:  PRINIVIL/ZESTRIL           lithium 300 MG CR tablet   Commonly known as:  ESKALITH/LITHOBID           mirtazapine 15 MG tablet   Commonly known as:  REMERON                Where to get your medicines      These medications were sent to [id number] - [place] - [address] AT [facility] [address]  [address], [place] [address]     Phone:  [phone]     OLANZapine 5 MG tablet                Primary Care Provider Office Phone # Fax #    Ander Fuchs [phone]       [facility] [place] [address]  [place] [address]        Equal Access to Services     [facility] AH: Hadii aad ku hadasho Soomaali, waaxda luqadaha, qaybta kaalmada adeegyada, waxay reno oscar ah. So [facility] [phone].    ATENCIÓN: Si habla español, tiene a gold disposición servicios gratuitos de asistencia lingüística. Llame al [phone].    We comply with applicable federal civil rights laws and [place] laws. We do not discriminate on the basis of race, color, national origin, age, disability sex, sexual orientation or gender identity.            Thank  you!     Thank you for choosing [facility]  for your care. Our goal is always to provide you with excellent care. Hearing back from our patients is one way we can continue to improve our services. Please take a few minutes to complete the written survey that you may receive in the mail after your visit with us. Thank you!             Your Updated Medication List - Protect others around you: Learn how to safely use, store and throw away your medicines at www.disposemymeds.org.          This list is accurate as of: 8/10/17 11:59 PM.  Always use your most recent med list.                   Brand Name Dispense Instructions for use Diagnosis    clindamycin 1 % lotion    CLEOCIN T     Apply topically every morning        clonazePAM 1 MG tablet    klonoPIN    270 tablet    Take 1 tablet (1 mg) by mouth 3 times daily as needed for anxiety    Bipolar affective disorder, current episode hypomanic (H)       desogestrel-ethinyl estradiol 0.15-30 MG-MCG per tablet    APRI     Take 1 tablet by mouth daily        * HYDROXYZINE HCL PO      Take 100 mg by mouth daily as needed        * hydrOXYzine 50 MG tablet    ATARAX    270 tablet    Take 1 tablet (50 mg) by mouth 3 times daily as needed for itching    Anxiety disorder       IBUPROFEN PO      Take 400-600 mg by mouth every 6 hours as needed for moderate pain        OLANZapine 5 MG tablet    zyPREXA    60 tablet    Take 1 tablet (5 mg) by mouth 3 times daily as needed For paranoia, racing thoughts or suicide thoughts.    Bipolar I disorder (H)       PROTONIX PO      Take 40 mg by mouth every morning (before breakfast)        RELPAX PO      Take 20 mg by mouth at onset of headache for migraine (May repeat in 2 hours if needed)        SYNTHROID PO      Take 50 mcg by mouth daily        TIZANIDINE HCL PO      Take 4 mg by mouth At Bedtime        TORADOL IM      Inject 30 mg into the muscle daily as needed Per pt for migraines        triamcinolone 0.1 % cream    KENALOG      Apply topically 2 times daily as needed for irritation        TYLENOL 8 HOUR PO      Take 1,000 mg by mouth every 6 hours as needed (pain)        vancomycin 50 mg/mL Liqd solution    VANOCIN    205 mL    Take by mouth as directed:  125mg QID x 10 days, then 125mg TID x 7 days, then 125mg BID x 7 days, then 125mg daily x 7 days.    Clostridium difficile colitis       ZOFRAN ODT PO      Take 4 mg by mouth every 8 hours as needed for nausea        * Notice:  This list has 2 medication(s) that are the same as other medications prescribed for you. Read the directions carefully, and ask your doctor or other care provider to review them with you.

## 2017-08-15 ENCOUNTER — TELEPHONE (OUTPATIENT)
Dept: PSYCHIATRY | Facility: CLINIC | Age: 47
End: 2017-08-15

## 2017-08-15 NOTE — TELEPHONE ENCOUNTER
----- Message from Pratibha Li sent at 8/14/2017  4:40 PM CDT -----  Regarding: Pt schedule/Michael  Contact: [phone]  Pt called to schedule with you. Says Dr Rodriguez wanted to see her back in 2-4 weeks, but I do not see any openings in his schedule. Pt said to have you or Dr Rodriguez look and let her know when/if she can get back in to see him within this timeframe. I am happy to follow up with pt as well if you provide dates and times Dr Rodriguez may be available.    OK to [facility].    Thanks

## 2017-08-23 NOTE — PROGRESS NOTES
Ana Laura re-filed for divorce. She did not move to [place] as she had planned. She did not see her son between March and July 4. No side-effects. Mood has been very stable. C-dif still problematic.       Mood is normally reactive. Sleep is disturbed, with early AM awakening. Appetite is poor, though she does drink Boost. Memory is impaired. She has energy most of the time. No homicidal ideation. She has suicidal ideation in relation to 's behavior, but this is infrequent. No flashbacks. No hallucinations, paranoia, or ideas of reference. Anxiety is somewhat better. Panic is improving. No OCD. She uses alcohol once per week, but no substance use.       Ana Laura was cooperative and dramatic. Eye contact was fair and grooming was good. Mood was reactive and affect was bland. Normal speech and movements. Thought process was logical. No psychosis. She has infrequent suicidal ideation but no homicidal information. She was oriented x4. Recent recall was OK.       MEDICATIONS:   1. Lithium  mg   2. Remeron 15 mg   3. Amitriptyline 10 mg QHS   4. Vistaril  mg   5. Klonopin 1 mg TID PRN      ASSESSMENT:   1. BPAD type I      PLAN:   1. No changes.   2. No medications dispensed.   3. Follow-up on 09/11 at 12:30      This was a 30-minute face-to-face encounter, of which greater than 50% of the time was spent providing support for situations with her .

## 2017-08-31 NOTE — PROGRESS NOTES
Ana Laura was hospitalized at [facility] for what sounds like a mixed state. She is afraid to eat because it just results in diarrhea. She has recurrent bladder infections. She was off lithium secondary to toxicity.       Her mood is reactive. Sleep is broken and non-restorative. Appetite is poor. Concentration is fair. Energy and motivation is OK. She can experience pleasure to some extent. No homicidal ideation. Recently she has been suicidal without intent. No flashbacks. No hallucinations. She believes that people are talking about her. No ideas of reference. Anxiety is reactive. No panic attacks. No OCD. Her alcohol use is decreased, and there has been no substance use.      Ana Laura's was cooperative with the interview. Eye contact was good. Grooming was good but she is extremely thin. Mood is reactive and not elated, and affect was well-modulated. Speech is not pressured but of increased quantity. Movements were normal. There was no flight of ideas of looseness of associations. No psychosis. No suicidal or homicidal ideation. Oriented x4. Recent recall was good.       MEDICATIONS:   1. Klonopin 1 mg TID PRN   2. Vistaril PRN   3. She was on Zyprexa while hospitalized   4. She's been off lithium for five days      ASSESSMENT:   1. Bipolar, off lithium and on no mood stabilizers      PLAN:   1. Add Zyprexa 5 mg PRN racing thoughts, paranoia, or suicide thoughts.      This was a 30-minute face-to-face encounter, with more than half spent on review of maintenance medications for bipolar.

## 2017-10-02 ENCOUNTER — HOSPITAL ENCOUNTER (EMERGENCY)
Facility: CLINIC | Age: 47
Discharge: HOME | End: 2017-10-02
Attending: EMERGENCY MEDICINE | Admitting: EMERGENCY MEDICINE
Payer: COMMERCIAL

## 2017-10-02 VITALS
RESPIRATION RATE: 18 BRPM | OXYGEN SATURATION: 98 % | BODY MASS INDEX: 16.64 KG/M2 | HEART RATE: 96 BPM | SYSTOLIC BLOOD PRESSURE: 126 MMHG | WEIGHT: 100 LBS | TEMPERATURE: 97.9 F | DIASTOLIC BLOOD PRESSURE: 82 MMHG

## 2017-10-02 DIAGNOSIS — N39.0 ACUTE UTI: ICD-10-CM

## 2017-10-02 LAB
ALBUMIN UR-MCNC: 30 MG/DL
APPEARANCE UR: ABNORMAL
BACTERIA #/AREA URNS HPF: ABNORMAL /HPF
BILIRUB UR QL STRIP: NEGATIVE
COLOR UR AUTO: YELLOW
GLUCOSE UR STRIP-MCNC: NEGATIVE MG/DL
HCG UR QL: NEGATIVE
HGB UR QL STRIP: ABNORMAL
KETONES UR STRIP-MCNC: NEGATIVE MG/DL
LEUKOCYTE ESTERASE UR QL STRIP: ABNORMAL
MUCOUS THREADS #/AREA URNS LPF: PRESENT /LPF
NITRATE UR QL: NEGATIVE
PH UR STRIP: 6 PH (ref 5–7)
RBC #/AREA URNS AUTO: 63 /HPF (ref 0–2)
RENAL EPI CELLS #/AREA URNS HPF: 1 /HPF
SOURCE: ABNORMAL
SP GR UR STRIP: 1.02 (ref 1–1.03)
SQUAMOUS #/AREA URNS AUTO: 1 /HPF (ref 0–1)
UROBILINOGEN UR STRIP-MCNC: 2 MG/DL (ref 0–2)
WBC #/AREA URNS AUTO: >182 /HPF (ref 0–2)
WBC CLUMPS #/AREA URNS HPF: PRESENT /HPF

## 2017-10-02 PROCEDURE — 99283 EMERGENCY DEPT VISIT LOW MDM: CPT

## 2017-10-02 PROCEDURE — 87186 SC STD MICRODIL/AGAR DIL: CPT | Performed by: EMERGENCY MEDICINE

## 2017-10-02 PROCEDURE — 87086 URINE CULTURE/COLONY COUNT: CPT | Performed by: EMERGENCY MEDICINE

## 2017-10-02 PROCEDURE — 87088 URINE BACTERIA CULTURE: CPT | Performed by: EMERGENCY MEDICINE

## 2017-10-02 PROCEDURE — 25000132 ZZH RX MED GY IP 250 OP 250 PS 637: Mod: GY | Performed by: EMERGENCY MEDICINE

## 2017-10-02 PROCEDURE — 81025 URINE PREGNANCY TEST: CPT | Performed by: EMERGENCY MEDICINE

## 2017-10-02 PROCEDURE — 81001 URINALYSIS AUTO W/SCOPE: CPT | Performed by: EMERGENCY MEDICINE

## 2017-10-02 PROCEDURE — A9270 NON-COVERED ITEM OR SERVICE: HCPCS | Mod: GY | Performed by: EMERGENCY MEDICINE

## 2017-10-02 RX ORDER — CIPROFLOXACIN 500 MG/1
500 TABLET, FILM COATED ORAL 2 TIMES DAILY
Qty: 14 TABLET | Refills: 0 | Status: SHIPPED | OUTPATIENT
Start: 2017-10-02 | End: 2017-10-09

## 2017-10-02 RX ORDER — OXYCODONE AND ACETAMINOPHEN 5; 325 MG/1; MG/1
1-2 TABLET ORAL EVERY 6 HOURS PRN
Qty: 10 TABLET | Refills: 0 | Status: ON HOLD | OUTPATIENT
Start: 2017-10-02 | End: 2018-01-04

## 2017-10-02 RX ORDER — OXYCODONE AND ACETAMINOPHEN 5; 325 MG/1; MG/1
2 TABLET ORAL ONCE
Status: COMPLETED | OUTPATIENT
Start: 2017-10-02 | End: 2017-10-02

## 2017-10-02 RX ADMIN — OXYCODONE HYDROCHLORIDE AND ACETAMINOPHEN 2 TABLET: 5; 325 TABLET ORAL at 02:28

## 2017-10-02 ASSESSMENT — ENCOUNTER SYMPTOMS
VOMITING: 0
FEVER: 0
NAUSEA: 0
DYSURIA: 0
ABDOMINAL PAIN: 1
COUGH: 0
DIAPHORESIS: 0
CHILLS: 0
FREQUENCY: 0
HEMATURIA: 0

## 2017-10-02 NOTE — ED PROVIDER NOTES
History     Chief Complaint:  UTI    HPI   Ana Laura Wharton is a 46 year old female, with a history of UTI and C. diff colitis, who presents to the ED for evaluation of UTI symptoms. The patient reports she has been having fevers, abdominal pain, dysuria, hematuria, urgency, and frequency for the past 4 days. The patient denies any chills, diaphoresis, cough, nausea, vomiting, or vaginal discharge.      Allergies:  Amoxicillin-pot clavulanate  Azithromycin  Cephalexin  Ciprofloxacin  Compazine  Metoclopramide  Minocycline  Penicillins  Reglan  Restoril  Thorazine  Abilify   Lamotrigine   Sulfa Drugs      Medications:    OLANZapine (ZYPREXA) 5 MG tablet   clonazePAM (KLONOPIN) 1 MG tablet   hydrOXYzine (ATARAX) 50 MG tablet   Eletriptan Hydrobromide (RELPAX PO)   HYDROXYZINE HCL PO   IBUPROFEN PO   Levothyroxine Sodium (SYNTHROID PO)   desogestrel-ethinyl estradiol (APRI) 0.15-30 MG-MCG per tablet   triamcinolone (KENALOG) 0.1 % cream   Ondansetron (ZOFRAN ODT PO)   clindamycin (CLEOCIN T) 1 % lotion   TIZANIDINE HCL PO   Pantoprazole Sodium (PROTONIX PO)   Ketorolac Tromethamine (TORADOL IM)   Acetaminophen (TYLENOL 8 HOUR PO)     Past Medical History:    Pneumothorax    Migraine  Hypothyroidism  GERD  HTN  Depression  C. diff colitis   Anxiety  Bipolar disorder    Tear film insufficiency  Exotropia  Orofacial dyskinesia  Impulse control disorder  Myopia  Cervix neoplasm  Renal failure  UTI  Cystitis    Past Surgical History:    Thoracic surgery  Left shoulder surgery  Left knee surgery  Cervix surgery      Family History:    Depression  HLD  Macular degeneration    Social History:  Smoking status: Former smoker, Quit date 1/1/1997  Alcohol use: No  Present to ED alone   Marital Status:   [2]     Review of Systems   Constitutional: Negative for chills, diaphoresis and fever.   Respiratory: Negative for cough.    Gastrointestinal: Positive for abdominal pain. Negative for nausea and vomiting.   Genitourinary:  Negative for dysuria, frequency, hematuria, urgency and vaginal discharge.   All other systems reviewed and are negative.    46-year-old female presents to the ER with complaints of painful urination and lower abd pain. Pt states she believes she has a UTI or a kidney infection. States she has had those before. This episode has been going on for the last four days. Complaints of chills and episodes she has felt hot however no fever that was reported.     Physical Exam     Patient Vitals for the past 24 hrs:   BP Temp Temp src Pulse Resp SpO2 Weight   10/02/17 0308 126/82 - - - - - -   10/02/17 0105 143/88 97.9  F (36.6  C) Oral 96 18 98 % 45.4 kg (100 lb)     Physical Exam  GEN: patient appears tired  HEAD: atraumatic, normocephalic  EYES: pupils reactive, conjunctivae normal  ENT: TMs flat and white bilaterally, oropharynx normal with no erythema or exudate, mucus membranes moist  NECK: no cervical LAD  RESPIRATORY: no tachypnea, breath sounds clear to auscultation (no rales, wheezes, rhonchi)  CVS: normal S1/S2, no murmurs/rubs/gallops  BACK: no CVAT  ABDOMEN: soft, nontender, no masses or organomegaly, no rebound, decreased bowel sounds  EXTREMITIES: intact pulses x 2 (radial pulses intact), no edema  SKIN: warm and dry  NEURO:   Overall symmetrical exam  HEME: no bruising or petechiae/contusions  LYMPH: no lymphadenopathy    Emergency Department Course     Laboratory:  UA: Blood moderate, Protein albumin 30, Leukocyte esterase large, WBC >182(H), RBC 63(H), WBC clumps present, Bacteria few, Renal tub epi 1, Mucous present   HCG: Negative     Urine culture aerobic bacterial: In process     Interventions:  0228: Percocet 5-325mg 2 tablets Oral     Emergency Department Course:  Past medical records, nursing notes, and vitals reviewed.  0206: I performed an exam of the patient and obtained history, as documented above.      Findings and plan explained to the Patient. Patient discharged home with instructions  regarding supportive care, medications, and reasons to return. The importance of close follow-up was reviewed.     Impression & Plan      Medical Decision Making:  This is a 46 year old female, who has a history of C diff colitis and UTIs and who states that the only antibiotic she can take is Ciprofloxacin, who presents with UTI symptoms. She does take probiotics. She states that she's aching in her back, but there's no CVAT tenderness, no fever, and she is not tachycardic. Her urine shows large leukocyte esterase, 102 WBCs, RBC and WBC clumps, and has been sent for a culture. The patient is not pregnant. I'm going to send her home on probiotics in addition to Cipro, and she will follow-up with her primary care. Limited amount of pain medicine at home, and she understands the reasons to return.     Patient does have a history of resistent infections and urine culture is ordered.  Patient is allergic to many antibiotics and states that cipro is the only one she can tolerate.  Patient does have a history of c diff and will take probiotics.    Diagnosis:    ICD-10-CM   1. Acute UTI N39.0     Disposition: Patient discharged to home     Discharge Medications:   Details   ciprofloxacin (CIPRO) 500 MG tablet Take 1 tablet (500 mg) by mouth 2 times daily for 7 days, Disp-14 tablet, R-0, Local PrintPatient states can take cipro      oxyCODONE-acetaminophen (PERCOCET) 5-325 MG per tablet Take 1-2 tablets by mouth every 6 hours as needed for moderate to severe pain, Disp-10 tablet, R-0, Local Print     Instructions to patient:  Take all the antibiotics.    All antibiotics can cause antibiotic-associated diarrhea- consider probiotics (pills with Lactobacillus) or yogurt while taking the antibiotics.    Antibiotics can cause diarrhea and can clean out normal bacteria- please use probiotics (pills with Lactobacillus available from the pharmacy) or yogurt 2-3X day while taking antibiotics.    Any antibiotic has the potential to  cause a reaction- fever, rash, aching, or other complications.    Side effects can occur with all medications, such as rash, fevers, GI upset, diarrhea.  Reasons to return: chest pain, shortness of breath, nausea/vomiting, bleeding, confusion, blood in stools, dizziness, passing out, increasing headache, weakness, inability to walk.  Also return if cough, difficulty breathing, nausea/vomiting, confusion, or any other problems.    Levaquin (levofloxacin) and cipro (ciprofloxacin) are great antibiotics.  Some people are resistant to penicillin and other antibiotics, leaving this class of drug as the only choice.  However, a very small percentage of the patients who receive either of these medicines report numbness (ie a neuropathy) or tendon weakness/failure (especially the achilles tendon).  If you get either of these symptoms, please stop the antibiotics and call your doctor.    Katheryn Feldman  10/2/2017   [facility] EMERGENCY DEPARTMENT    I, Katheryn Feldman, am serving as a scribe at 2:06 AM on 10/2/2017 to document services personally performed by Heather Alexander MD based on my observations and the provider's statements to me.        Heather Alexander MD  10/02/17 0541

## 2017-10-02 NOTE — ED AVS SNAPSHOT
[facility] Emergency Department    [address]    [facility] [address]    Phone:  [phone]    Fax:  [phone]                                       Ana Laura Wharton   MRN: [id number]    Department:  [facility] Emergency Department   Date of Visit:  10/2/2017           After Visit Summary Signature Page     I have received my discharge instructions, and my questions have been answered. I have discussed any challenges I see with this plan with the nurse or doctor.    ..........................................................................................................................................  Patient/Patient Representative Signature      ..........................................................................................................................................  Patient Representative Print Name and Relationship to Patient    ..................................................               ................................................  Date                                            Time    ..........................................................................................................................................  Reviewed by Signature/Title    ...................................................              ..............................................  Date                                                            Time

## 2017-10-02 NOTE — ED AVS SNAPSHOT
[facility] Emergency Department    [address] [place] [address]    Phone:  [phone]    Fax:  [phone]                                       Ana Laura Wharton   MRN: [id number]    Department:  [facility] Emergency Department   Date of Visit:  10/2/2017           Patient Information     Date Of Birth          1970        Your diagnoses for this visit were:     Acute UTI        You were seen by Heather Alexander MD.      Follow-up Information     Follow up with Ander Fuchs MD.    Contact information:    [facility]  [address]  [facility] [address]  [phone]          Discharge Instructions       Take all the antibiotics.    All antibiotics can cause antibiotic-associated diarrhea- consider probiotics (pills with Lactobacillus) or yogurt while taking the antibiotics.    Antibiotics can cause diarrhea and can clean out normal bacteria- please use probiotics (pills with Lactobacillus available from the pharmacy) or yogurt 2-3X day while taking antibiotics.    Any antibiotic has the potential to cause a reaction- fever, rash, aching, or other complications.    Side effects can occur with all medications, such as rash, fevers, GI upset, diarrhea.  Reasons to return: chest pain, shortness of breath, nausea/vomiting, bleeding, confusion, blood in stools, dizziness, passing out, increasing headache, weakness, inability to walk.  Also return if cough, difficulty breathing, nausea/vomiting, confusion, or any other problems.    Levaquin (levofloxacin) and cipro (ciprofloxacin) are great antibiotics.  Some people are resistant to penicillin and other antibiotics, leaving this class of drug as the only choice.  However, a very small percentage of the patients who receive either of these medicines report numbness (ie a neuropathy) or tendon weakness/failure (especially the achilles tendon).  If you get either of these symptoms, please stop the  antibiotics and call your doctor.    Discharge References/Attachments     URINARY TRACT INFECTIONS IN WOMEN (ENGLISH)      Future Appointments        Provider Department Dept Phone Center    10/23/2017 2:00 PM Jaden Rodriguez MD Psychiatry [facility] [phone] [facility]      24 Hour Appointment Hotline       To make an appointment at any [facility], call [phone] ([phone]). If you don't have a family doctor or clinic, we will help you find one. [facility] (formerly [facility] are conveniently located to serve the needs of you and your family.             Review of your medicines      START taking        Dose / Directions Last dose taken    * ciprofloxacin 500 MG tablet   Commonly known as:  CIPRO   Dose:  500 mg   Quantity:  14 tablet        Take 1 tablet (500 mg) by mouth 2 times daily for 7 days   Refills:  0        * ciprofloxacin 500 MG tablet   Commonly known as:  CIPRO   Dose:  500 mg   Quantity:  14 tablet        Take 1 tablet (500 mg) by mouth 2 times daily for 7 days   Refills:  0        oxyCODONE-acetaminophen 5-325 MG per tablet   Commonly known as:  PERCOCET   Dose:  1-2 tablet   Quantity:  10 tablet        Take 1-2 tablets by mouth every 6 hours as needed for moderate to severe pain   Refills:  0        * Notice:  This list has 2 medication(s) that are the same as other medications prescribed for you. Read the directions carefully, and ask your doctor or other care provider to review them with you.      Our records show that you are taking the medicines listed below. If these are incorrect, please call your family doctor or clinic.        Dose / Directions Last dose taken    clindamycin 1 % lotion   Commonly known as:  CLEOCIN T        Apply topically every morning   Refills:  0        clonazePAM 1 MG tablet   Commonly known as:  klonoPIN   Dose:  1 mg   Quantity:  270 tablet        Take 1 tablet (1 mg) by mouth 3 times daily as needed for anxiety   Refills:  0        desogestrel-ethinyl  estradiol 0.15-30 MG-MCG per tablet   Commonly known as:  APRI   Dose:  1 tablet        Take 1 tablet by mouth daily   Refills:  0        * HYDROXYZINE HCL PO   Dose:  100 mg        Take 100 mg by mouth daily as needed   Refills:  0        * hydrOXYzine 50 MG tablet   Commonly known as:  ATARAX   Dose:  50 mg   Quantity:  270 tablet        Take 1 tablet (50 mg) by mouth 3 times daily as needed for itching   Refills:  0        IBUPROFEN PO   Dose:  400-600 mg        Take 400-600 mg by mouth every 6 hours as needed for moderate pain   Refills:  0        OLANZapine 5 MG tablet   Commonly known as:  zyPREXA   Dose:  5 mg   Quantity:  60 tablet        Take 1 tablet (5 mg) by mouth 3 times daily as needed For paranoia, racing thoughts or suicide thoughts.   Refills:  1        PROTONIX PO   Dose:  40 mg        Take 40 mg by mouth every morning (before breakfast)   Refills:  0        RELPAX PO   Dose:  20 mg        Take 20 mg by mouth at onset of headache for migraine (May repeat in 2 hours if needed)   Refills:  0        SYNTHROID PO   Dose:  50 mcg        Take 50 mcg by mouth daily   Refills:  0        TIZANIDINE HCL PO   Dose:  4 mg        Take 4 mg by mouth At Bedtime   Refills:  0        TORADOL IM   Dose:  30 mg        Inject 30 mg into the muscle daily as needed Per pt for migraines   Refills:  0        triamcinolone 0.1 % cream   Commonly known as:  KENALOG        Apply topically 2 times daily as needed for irritation   Refills:  0        TYLENOL 8 HOUR PO   Dose:  1000 mg        Take 1,000 mg by mouth every 6 hours as needed (pain)   Refills:  0        vancomycin 50 mg/mL Liqd solution   Commonly known as:  VANOCIN   Indication:  Clostridium difficile Bacteria   Quantity:  205 mL        Take by mouth as directed:  125mg QID x 10 days, then 125mg TID x 7 days, then 125mg BID x 7 days, then 125mg daily x 7 days.   Refills:  0        ZOFRAN ODT PO   Dose:  4 mg        Take 4 mg by mouth every 8 hours as needed for  nausea   Refills:  0        * Notice:  This list has 2 medication(s) that are the same as other medications prescribed for you. Read the directions carefully, and ask your doctor or other care provider to review them with you.            Prescriptions were sent or printed at these locations (3 Prescriptions)                   Other Prescriptions                Printed at Department/Unit printer (3 of 3)         ciprofloxacin (CIPRO) 500 MG tablet               oxyCODONE-acetaminophen (PERCOCET) 5-325 MG per tablet               ciprofloxacin (CIPRO) 500 MG tablet                Procedures and tests performed during your visit     HCG qualitative urine    UA with Microscopic    Urine Culture Aerobic Bacterial      Orders Needing Specimen Collection     None      Pending Results     Date and Time Order Name Status Description    10/2/2017 0219 Urine Culture Aerobic Bacterial In process             Pending Culture Results     Date and Time Order Name Status Description    10/2/2017 0219 Urine Culture Aerobic Bacterial In process             Pending Results Instructions     If you had any lab results that were not finalized at the time of your Discharge, you can call the ED Lab Result RN at [phone]. You will be contacted by this team for any positive Lab results or changes in treatment. The nurses are available 7 days a week from 10A to 6:30P.  You can leave a message 24 hours per day and they will return your call.        Test Results From Your Hospital Stay        10/2/2017  1:23 AM      Component Results     Component Value Ref Range & Units Status    Color Urine Yellow  Final    Appearance Urine Cloudy  Final    Glucose Urine Negative NEG^Negative mg/dL Final    Bilirubin Urine Negative NEG^Negative Final    Ketones Urine Negative NEG^Negative mg/dL Final    Specific Gravity Urine 1.017 1.003 - 1.035 Final    Blood Urine Moderate (A) NEG^Negative Final    pH Urine 6.0 5.0 - 7.0 pH Final    Protein Albumin Urine  30 (A) NEG^Negative mg/dL Final    Urobilinogen mg/dL 2.0 0.0 - 2.0 mg/dL Final    Nitrite Urine Negative NEG^Negative Final    Leukocyte Esterase Urine Large (A) NEG^Negative Final    Source Midstream Urine  Final    WBC Urine >182 (H) 0 - 2 /HPF Final    RBC Urine 63 (H) 0 - 2 /HPF Final    WBC Clumps Present (A) NEG^Negative /HPF Final    Bacteria Urine Few (A) NEG^Negative /HPF Final    Squamous Epithelial /HPF Urine 1 0 - 1 /HPF Final    Renal Tub Epi 1 (A) NEG^Negative /HPF Final    Mucous Urine Present (A) NEG^Negative /LPF Final         10/2/2017  1:24 AM      Component Results     Component Value Ref Range & Units Status    HCG Qual Urine Negative NEG^Negative Final    This test is for screening purposes.  Results should be interpreted along with   the clinical picture.  Confirmation testing is available if warranted by   ordering [id number], HCG Quantitative Pregnancy.           10/2/2017  2:26 AM                Clinical Quality Measure: Blood Pressure Screening     Your blood pressure was checked while you were in the emergency department today. The last reading we obtained was  BP: 143/88 . Please read the guidelines below about what these numbers mean and what you should do about them.  If your systolic blood pressure (the top number) is less than 120 and your diastolic blood pressure (the bottom number) is less than 80, then your blood pressure is normal. There is nothing more that you need to do about it.  If your systolic blood pressure (the top number) is 120-139 or your diastolic blood pressure (the bottom number) is 80-89, your blood pressure may be higher than it should be. You should have your blood pressure rechecked within a year by a primary care provider.  If your systolic blood pressure (the top number) is 140 or greater or your diastolic blood pressure (the bottom number) is 90 or greater, you may have high blood pressure. High blood pressure is treatable, but if left untreated over time it  can put you at risk for heart attack, stroke, or kidney failure. You should have your blood pressure rechecked by a primary care provider within the next 4 weeks.  If your provider in the emergency department today gave you specific instructions to follow-up with your doctor or provider even sooner than that, you should follow that instruction and not wait for up to 4 weeks for your follow-up visit.        Thank you for choosing [facility]       Thank you for choosing [facility] for your care. Our goal is always to provide you with excellent care. Hearing back from our patients is one way we can continue to improve our services. Please take a few minutes to complete the written survey that you may receive in the mail after you visit with us. Thank you!        [facility] Information     [employer] gives you secure access to your electronic health record. If you see a primary care provider, you can also send messages to your care team and make appointments. If you have questions, please call your primary care clinic.  If you do not have a primary care provider, please call [phone] and they will assist you.        Care EveryWhere ID     This is your Care EveryWhere ID. This could be used by other organizations to access your [facility] medical records  [id number]        Equal Access to Services     OLGA LUTZ : Liliam Staley, rolando burnette, jaspal valdovinos. So [facility] [phone].    ATENCIÓN: Si habla español, tiene a gold disposición servicios gratuitos de asistencia lingüística. Izabela al [phone].    We comply with applicable federal civil rights laws and [place] laws. We do not discriminate on the basis of race, color, national origin, age, disability, sex, sexual orientation, or gender identity.            After Visit Summary       This is your record. Keep this with you and show to your community pharmacist(s) and doctor(s) at your next visit.

## 2017-10-02 NOTE — DISCHARGE INSTRUCTIONS
Take all the antibiotics.    All antibiotics can cause antibiotic-associated diarrhea- consider probiotics (pills with Lactobacillus) or yogurt while taking the antibiotics.    Antibiotics can cause diarrhea and can clean out normal bacteria- please use probiotics (pills with Lactobacillus available from the pharmacy) or yogurt 2-3X day while taking antibiotics.    Any antibiotic has the potential to cause a reaction- fever, rash, aching, or other complications.    Side effects can occur with all medications, such as rash, fevers, GI upset, diarrhea.  Reasons to return: chest pain, shortness of breath, nausea/vomiting, bleeding, confusion, blood in stools, dizziness, passing out, increasing headache, weakness, inability to walk.  Also return if cough, difficulty breathing, nausea/vomiting, confusion, or any other problems.    Levaquin (levofloxacin) and cipro (ciprofloxacin) are great antibiotics.  Some people are resistant to penicillin and other antibiotics, leaving this class of drug as the only choice.  However, a very small percentage of the patients who receive either of these medicines report numbness (ie a neuropathy) or tendon weakness/failure (especially the achilles tendon).  If you get either of these symptoms, please stop the antibiotics and call your doctor.

## 2017-10-02 NOTE — ED NOTES
46-year-old female presents to the ER with complaints of painful urination and lower abd pain. Pt states she believes she has a UTI or a kidney infection. States she has had those before. This episode has been going on for the last four days. Complaints of chills and episodes she has felt hot however no fever that was reported.

## 2017-10-03 LAB
BACTERIA SPEC CULT: ABNORMAL
BACTERIA SPEC CULT: ABNORMAL
Lab: ABNORMAL
SPECIMEN SOURCE: ABNORMAL

## 2017-10-04 ENCOUNTER — TELEPHONE (OUTPATIENT)
Dept: EMERGENCY MEDICINE | Facility: CLINIC | Age: 47
End: 2017-10-04

## 2017-10-04 NOTE — TELEPHONE ENCOUNTER
[facility] Emergency Department Lab result notification [Adult-Female]    [facility] ED lab result protocol used  Urine Culture    Reason for call  Notify of lab results, assess symptoms,  review ED providers recommendations/discharge instructions (if necessary) and advise per ED lab result f/u protocol    Lab Result (including Rx patient on, if applicable)  Final Urine Culture Report on 10/04/2107  [facility] ED discharge antibiotic: Ciprofloxacin (Cipro) 500 mg tablet, Take 1 tablet (500 mg) by mouth 2 times daily for 7 days  #1. Bacteria, 50,000 to 100,000 colonies/mL Enterococcus faecalis,  is NOT TESTED to ED discharge antibiotic.   Change in treatment as per [facility] ED Lab result protocol.    Information table from ED Provider visit on 10/2/17  ED diagnosis   Acute UTI    ED provider   Heather Alexander MD    Symptoms reported at ED visit (Chief complaint, HPI) Ana Laura Wharton is a 46 year old female, with a history of UTI and C. diff colitis, who presents to the ED for evaluation of UTI symptoms. The patient reports she has been having fevers, abdominal pain, dysuria, hematuria, urgency, and frequency for the past 4 days. The patient denies any chills, diaphoresis, cough, nausea, vomiting, or vaginal discharge.     ED providers Impression and Plan (applicable information) This is a 46 year old female, who has a history of C diff colitis and UTIs and who states that the only antibiotic she can take is Ciprofloxacin, who presents with UTI symptoms. She does take probiotics. She states that she's aching in her back, but there's no CVAT tenderness, no fever, and she is not tachycardic. Her urine shows large leukocyte esterase, 102 WBCs, RBC and WBC clumps, and has been sent for a culture. The patient is not pregnant. I'm going to send her home on probiotics in addition to Cipro, and she will follow-up with her primary care. Limited amount of pain medicine at home, and she understands the reasons to  "return.     Patient does have a history of resistent infections and urine culture is ordered.  Patient is allergic to many antibiotics and states that cipro is the only one she can tolerate.  Patient does have a history of c diff and will take probiotics.   Significant Medical hx, if applicable GERD, C diff, UTI, cystitis, cervix neoplasm, renal failure, HTN   Coumadin/Warfarin [Yes /No] No   Creatinine Level (mg/dl) Not available   Creatinine clearance (ml/min), if applicable Not available   Pregnant (Yes/No/NA) No   Breastfeeding (Yes/No/NA) No   Allergies Augmentin, Azithromycin, Cephalexin, Cipro, PCN's, Sulfa drugs, Minocycline   Weight, if applicable 45.4 Kg      RN Assessment (Patient s current Symptoms), include time called.  [Insert Left message here if message left]  Message left at 10:29 am.   Upon return phone call, states symptoms are worsening.  Ana Laura does state she can also take Macrobid in addition to Cipro.  No other abx are options for her.  Advised this nurse will have to consult with ED provider on length of time as it may need a longer course than standard 3 days (for simple UTI) given her symptoms.   Ana Laura states she does need more pain medication as she is out of Percocet prescribed in ED.  Advised no further pain meds can be ordered over the phone.  States her urinary symptoms are \"getting worse\", \"I'm in a lot of pain\".   States she cannot go without pain meds, prefers to come back to ED for evaluation.  Did offer option of contacting her PCP for possible pain meds?    Please Contact your PCP clinic or return to the Emergency department if your:    Symptoms return.    Symptoms worsen or other concerning symptom's.    PCP follow-up Questions asked: YES       Nessa Mao RN    [place] [employer] [facility] RN  Lung Nodule and ED Lab Results F/U RN  Epic pool (ED late result f/u RN) : [id number]  Ph # [phone]    Copy of Lab result   Order   Urine Culture Aerobic Bacterial [[id number]] (Order " [id number])   Exam Information   Exam Date Exam Time Accession # Results    10/2/17  1:03 AM [id number]    Component Results   Component Collected Lab   Specimen Description 10/02/2017  1:03    Midstream Urine   Special Requests 10/02/2017  1:03 AM 75   Specimen received in preservative   Culture Micro (Abnormal) 10/02/2017  1:03    50,000 to 100,000 colonies/mL   Enterococcus faecalis      Culture Micro 10/02/2017  1:03    <10,000 colonies/mL   urogenital mina   Susceptibility testing not routinely done      Culture & Susceptibility   ENTEROCOCCUS FAECALIS   Antibiotic Interpretation Sensitivity Unit Method Status   AMPICILLIN Sensitive <=2 ug/mL TAMMIE Final   NITROFURANTOIN Sensitive <=16 ug/mL TAMMIE Final   PENICILLIN Sensitive 2 ug/mL TAMMIE Final   VANCOMYCIN Sensitive 1 ug/mL TAMMIE Final

## 2017-10-18 ENCOUNTER — TELEPHONE (OUTPATIENT)
Dept: PSYCHIATRY | Facility: CLINIC | Age: 47
End: 2017-10-18

## 2017-10-18 DIAGNOSIS — F31.9 BIPOLAR I DISORDER (H): ICD-10-CM

## 2017-10-18 DIAGNOSIS — F41.9 ANXIETY DISORDER: ICD-10-CM

## 2017-10-18 DIAGNOSIS — F31.0 BIPOLAR AFFECTIVE DISORDER, CURRENT EPISODE HYPOMANIC (H): ICD-10-CM

## 2017-10-18 RX ORDER — HYDROXYZINE HYDROCHLORIDE 50 MG/1
50 TABLET, FILM COATED ORAL 3 TIMES DAILY PRN
Qty: 90 TABLET | Refills: 0 | Status: ON HOLD | OUTPATIENT
Start: 2017-10-18 | End: 2018-01-04

## 2017-10-18 RX ORDER — OLANZAPINE 5 MG/1
5 TABLET ORAL 3 TIMES DAILY PRN
Qty: 60 TABLET | Refills: 0 | Status: SHIPPED | OUTPATIENT
Start: 2017-10-18 | End: 2017-10-23

## 2017-10-18 RX ORDER — CLONAZEPAM 1 MG/1
1 TABLET ORAL 3 TIMES DAILY PRN
Qty: 90 TABLET | Refills: 0 | Status: ON HOLD
Start: 2017-10-18 | End: 2018-01-04

## 2017-10-18 NOTE — TELEPHONE ENCOUNTER
Pratibha Thomas Michelle, RN        Phone Number: [phone]                     Caller:  patient   Medication:  Clonazepam, Vistril, Zyprexa   [facility]   Have you contacted the pharmacy: yes - patient says they would not take her refill request over the phone because clonazepam is a controlled substance - she was told to call her clinic to request the refill   How much of medication do you have left:  unknown   Pending appt: 10/23/17   Okay to leave VM:  yes     Patient would not confirm how much of the medication she had left.              Last seen: 8/10/17  RTC: not specified  Cancel: 10/19/17  No-show: 9/11/17  Next appt: 10/23/17    Per last office note:  Klonopin 1 mg TID PRN   Vistaril PRN   She was on Zyprexa while hospitalized...Add Zyprexa 5 mg PRN racing thoughts, paranoia, or suicide thoughts    Per med tab:  OLANZapine (ZYPREXA) 5 MG tablet 60 tablet 1 8/10/2017  --   Sig: Take 1 tablet (5 mg) by mouth 3 times daily as needed For paranoia, racing thoughts or suicide thoughts.   Class: E-Prescribe     clonazePAM (KLONOPIN) 1 MG tablet 270 tablet 0 6/20/2017  No   Sig: Take 1 tablet (1 mg) by mouth 3 times daily as needed for anxiety   Class: Local Print     hydrOXYzine (ATARAX) 50 MG tablet 270 tablet 0 3/27/2017  --   Sig: Take 1 tablet (50 mg) by mouth 3 times daily as needed for itching   Class: E-Prescribe     Per [place]-, Klonopin 1 mg tab #270 last filled: 6/20/17, 3/23/17     Called [facility] and they report that pt last rec'd #60- Zyprexa 5 mg tabs on 9/11/17, has no refills remaining.  [facility] has not filled Klonopin or Atarax in the recent past.    Routed to Dr. Rodriguez for auth to  per  Protocol

## 2017-10-18 NOTE — TELEPHONE ENCOUNTER
Jaden Rodriguez MD Bove, Michelle, RN        Caller: Unspecified (Today, 10:10 AM)                     OK to fill all for 30 days, no refills

## 2017-10-23 ENCOUNTER — OFFICE VISIT (OUTPATIENT)
Dept: PSYCHIATRY | Facility: CLINIC | Age: 47
End: 2017-10-23
Attending: PSYCHIATRY & NEUROLOGY
Payer: COMMERCIAL

## 2017-10-23 VITALS
SYSTOLIC BLOOD PRESSURE: 139 MMHG | BODY MASS INDEX: 18.24 KG/M2 | HEART RATE: 105 BPM | WEIGHT: 109.6 LBS | DIASTOLIC BLOOD PRESSURE: 76 MMHG

## 2017-10-23 DIAGNOSIS — F31.9 BIPOLAR I DISORDER (H): ICD-10-CM

## 2017-10-23 PROCEDURE — 99212 OFFICE O/P EST SF 10 MIN: CPT | Mod: ZF

## 2017-10-23 RX ORDER — OLANZAPINE 5 MG/1
5 TABLET ORAL 2 TIMES DAILY
Qty: 180 TABLET | Refills: 0 | Status: SHIPPED | OUTPATIENT
Start: 2017-10-23 | End: 2018-02-07

## 2017-10-23 NOTE — MR AVS SNAPSHOT
After Visit Summary   10/23/2017    Ana Laura Wharton    MRN: [id number]           Patient Information     Date Of Birth          1970        Visit Information        Provider Department      10/23/2017 2:30 PM Jaden Rodriguez MD [facility]        Today's Diagnoses     Bipolar I disorder (H)           Follow-ups after your visit        Who to contact     Please call your clinic at [phone] to:    Ask questions about your health    Make or cancel appointments    Discuss your medicines    Learn about your test results    Speak to your doctor   If you have compliments or concerns about an experience at your clinic, or if you wish to file a complaint, please contact [facility] Patient Relations at [phone] or email us at [email or web address]         Additional Information About Your Visit        MyChart Information     [employer] gives you secure access to your electronic health record. If you see a primary care provider, you can also send messages to your care team and make appointments. If you have questions, please call your primary care clinic.  If you do not have a primary care provider, please call [phone] and they will assist you.      [employer] is an electronic gateway that provides easy, online access to your medical records. With [employer], you can request a clinic appointment, read your test results, renew a prescription or communicate with your care team.     To access your existing account, please contact your [facility] or call [phone] for assistance.        Care EveryWhere ID     This is your Care EveryWhere ID. This could be used by other organizations to access your [facility] medical records  [id number]        Your Vitals Were     Pulse BMI (Body Mass Index)                105 18.24 kg/m2           Blood Pressure from Last 3 Encounters:   10/23/17 139/76   10/02/17 126/82   03/14/17 134/85     Weight from Last 3 Encounters:   10/23/17 49.7 kg (109 lb 9.6 oz)   10/02/17 45.4 kg (100 lb)   03/11/17 54.7 kg (120 lb 8 oz)              Today, you had the following     No orders found for display         Today's Medication Changes          These changes are accurate as of: 10/23/17 11:59 PM.  If you have any questions, ask your nurse or doctor.               These medicines have changed or have updated prescriptions.        Dose/Directions    OLANZapine 5 MG tablet   Commonly known as:  zyPREXA   This may have changed:    - when to take this  - reasons to take this  - additional instructions   Used for:  Bipolar I disorder (H)   Changed by:  Jaden Rodriguez MD        Dose:  5 mg   Take 1 tablet (5 mg) by mouth 2 times daily May take extra dose prn paranoia.   Quantity:  180 tablet   Refills:  0            Where to get your medicines      These medications were sent to [facility] [place], [place] - [address]  [address], [facility] [place] [facility] [address]     Phone:  [phone]     OLANZapine 5 MG tablet                Primary Care Provider Office Phone # Fax #    Ander Fuchs [phone]       [facility] [address]  [facility] [address]        Equal Access to Services     OLGA LUTZ AH: Hadii valente ku hadasho Soomaali, waaxda luqadaha, qaybta kaalmada adeegyada, waxsusana idiin haysarahn jeanie oscar . So [facility] [phone].    ATENCIÓN: Si habla español, tiene a gold disposición servicios gratuitos de asistencia lingüística. Llame al [phone].    We comply with applicable federal civil rights laws and [place] laws. We do not discriminate on the basis of race, color, national origin, age, disability, sex, sexual orientation, or gender identity.            Thank you!     Thank you for choosing [facility]  for your care. Our goal is always to provide you with excellent care. Hearing back from our patients is one way we can continue to improve  our services. Please take a few minutes to complete the written survey that you may receive in the mail after your visit with us. Thank you!             Your Updated Medication List - Protect others around you: Learn how to safely use, store and throw away your medicines at www.disposemymeds.org.          This list is accurate as of: 10/23/17 11:59 PM.  Always use your most recent med list.                   Brand Name Dispense Instructions for use Diagnosis    clindamycin 1 % lotion    CLEOCIN T     Apply topically every morning        clonazePAM 1 MG tablet    klonoPIN    90 tablet    Take 1 tablet (1 mg) by mouth 3 times daily as needed for anxiety    Bipolar affective disorder, current episode hypomanic (H)       desogestrel-ethinyl estradiol 0.15-30 MG-MCG per tablet    APRI     Take 1 tablet by mouth daily        * HYDROXYZINE HCL PO      Take 100 mg by mouth daily as needed        * hydrOXYzine 50 MG tablet    ATARAX    90 tablet    Take 1 tablet (50 mg) by mouth 3 times daily as needed for itching    Anxiety disorder       IBUPROFEN PO      Take 400-600 mg by mouth every 6 hours as needed for moderate pain        OLANZapine 5 MG tablet    zyPREXA    180 tablet    Take 1 tablet (5 mg) by mouth 2 times daily May take extra dose prn paranoia.    Bipolar I disorder (H)       oxyCODONE-acetaminophen 5-325 MG per tablet    PERCOCET    10 tablet    Take 1-2 tablets by mouth every 6 hours as needed for moderate to severe pain        PROTONIX PO      Take 40 mg by mouth every morning (before breakfast)        RELPAX PO      Take 20 mg by mouth at onset of headache for migraine (May repeat in 2 hours if needed)        SYNTHROID PO      Take 50 mcg by mouth daily        TIZANIDINE HCL PO      Take 4 mg by mouth At Bedtime        TORADOL IM      Inject 30 mg into the muscle daily as needed Per pt for migraines        triamcinolone 0.1 % cream    KENALOG     Apply topically 2 times daily as needed for irritation         TYLENOL 8 HOUR PO      Take 1,000 mg by mouth every 6 hours as needed (pain)        vancomycin 50 mg/mL Liqd solution    VANOCIN    205 mL    Take by mouth as directed:  125mg QID x 10 days, then 125mg TID x 7 days, then 125mg BID x 7 days, then 125mg daily x 7 days.    Clostridium difficile colitis       ZOFRAN ODT PO      Take 4 mg by mouth every 8 hours as needed for nausea        * Notice:  This list has 2 medication(s) that are the same as other medications prescribed for you. Read the directions carefully, and ask your doctor or other care provider to review them with you.

## 2017-11-15 NOTE — PROGRESS NOTES
Narrative:  Ongoing Colitis with frequent bladder infections because of near constant diarrhea. Some rapid cycling. When depressed gets a lot of cognitive issues.   Mood: Cycles into depression Q 2 wk. No hypermania   Auditory Hallucinations: 0   Sleep: insomnia when gets depressed   Visual/Tactile/Olfactory Hallucinations: 0   Appetite: Gained 15 lbs   Paranoia: When depressed to point of delusional   Concentration: Very poor in down spells   Ideas of Reference: 0   Energy/Motivation: energy good when not depressed   Anxiety: very high when depressed   Ability to Experience Pleasure: when not depressed   Panic Attacks: daily   Homicidal Ideation: 0   OCD: 0   Suicidal Ideation: when depressed   Alcohol Use: much less   Flashbacks: 0   Substance Use: stopped using   MSE   Cooperative: yes   Eye Contact: good   Grooming: good   Mood: currently reactive    Affect: wide range   Speech Rate: nl   Movements: nl   Thought Process: logical   Psychosis: 0   SI: not currently   HI: no   Orientation: x4   Recent Recall: ok      Medications: Klonopin 1 mg TID par, Hydroxyzine 50 TID, Zyprexa 5 BID-TID por paranoia      Assessment: Bipolar, rapid cycling, not interested in mood stabilizer      Plan: Zyprexa 5 BID, not por      This was a 30- minute face-to-face encounter, of which >50 of the time was spent on education about treatment options.      Entered by CAITLYN SADLER MD             D: 10/26/2017 12:54   T: 10/26/2017 13:06   MT: CAITLYN      Name:     KEE TINEO   MRN:      [id number]        Account:      [id number]   :      1970           Service Date: 10/23/2017      Document: [id number]

## 2017-12-13 ENCOUNTER — HOSPITAL ENCOUNTER (INPATIENT)
Facility: CLINIC | Age: 47
LOS: 1 days | Discharge: ANOTHER HOSPITAL | DRG: 917 | End: 2017-12-13
Attending: EMERGENCY MEDICINE | Admitting: HOSPITALIST
Payer: COMMERCIAL

## 2017-12-13 ENCOUNTER — APPOINTMENT (OUTPATIENT)
Dept: GENERAL RADIOLOGY | Facility: CLINIC | Age: 47
DRG: 917 | End: 2017-12-13
Attending: STUDENT IN AN ORGANIZED HEALTH CARE EDUCATION/TRAINING PROGRAM
Payer: COMMERCIAL

## 2017-12-13 ENCOUNTER — APPOINTMENT (OUTPATIENT)
Dept: CT IMAGING | Facility: CLINIC | Age: 47
DRG: 917 | End: 2017-12-13
Attending: EMERGENCY MEDICINE
Payer: COMMERCIAL

## 2017-12-13 ENCOUNTER — HOSPITAL ENCOUNTER (INPATIENT)
Facility: CLINIC | Age: 47
LOS: 23 days | Discharge: NURSING, REHAB OR LONG-TERM CARE | DRG: 917 | End: 2018-01-05
Attending: SURGERY | Admitting: SURGERY
Payer: COMMERCIAL

## 2017-12-13 ENCOUNTER — APPOINTMENT (OUTPATIENT)
Dept: GENERAL RADIOLOGY | Facility: CLINIC | Age: 47
DRG: 917 | End: 2017-12-13
Attending: EMERGENCY MEDICINE
Payer: COMMERCIAL

## 2017-12-13 VITALS
OXYGEN SATURATION: 100 % | TEMPERATURE: 97.8 F | HEIGHT: 63 IN | DIASTOLIC BLOOD PRESSURE: 49 MMHG | WEIGHT: 115 LBS | BODY MASS INDEX: 20.38 KG/M2 | RESPIRATION RATE: 18 BRPM | SYSTOLIC BLOOD PRESSURE: 96 MMHG | HEART RATE: 90 BPM

## 2017-12-13 DIAGNOSIS — R05.9 COUGH: Primary | ICD-10-CM

## 2017-12-13 DIAGNOSIS — I48.0 PAROXYSMAL ATRIAL FIBRILLATION (H): ICD-10-CM

## 2017-12-13 DIAGNOSIS — J96.02 ACUTE RESPIRATORY FAILURE WITH HYPOXIA AND HYPERCAPNIA (H): Primary | ICD-10-CM

## 2017-12-13 DIAGNOSIS — F31.32 BIPOLAR AFFECTIVE DISORDER, CURRENTLY DEPRESSED, MODERATE (H): Primary | ICD-10-CM

## 2017-12-13 DIAGNOSIS — T39.1X4A ACETAMINOPHEN OVERDOSE OF UNDETERMINED INTENT, INITIAL ENCOUNTER: ICD-10-CM

## 2017-12-13 DIAGNOSIS — F31.9 BIPOLAR I DISORDER (H): ICD-10-CM

## 2017-12-13 DIAGNOSIS — I10 ESSENTIAL HYPERTENSION: ICD-10-CM

## 2017-12-13 DIAGNOSIS — J96.01 ACUTE RESPIRATORY FAILURE WITH HYPOXIA AND HYPERCAPNIA (H): Primary | ICD-10-CM

## 2017-12-13 DIAGNOSIS — T50.902A INTENTIONAL DRUG OVERDOSE, INITIAL ENCOUNTER (H): ICD-10-CM

## 2017-12-13 DIAGNOSIS — R57.9 SHOCK CIRCULATORY (H): ICD-10-CM

## 2017-12-13 DIAGNOSIS — R53.81 PHYSICAL DECONDITIONING: ICD-10-CM

## 2017-12-13 DIAGNOSIS — A04.72 CLOSTRIDIUM DIFFICILE COLITIS: ICD-10-CM

## 2017-12-13 DIAGNOSIS — F10.929 ALCOHOLIC INTOXICATION WITH COMPLICATION (H): ICD-10-CM

## 2017-12-13 DIAGNOSIS — T46.1X4A: ICD-10-CM

## 2017-12-13 PROBLEM — T50.901A DRUG OVERDOSE: Status: ACTIVE | Noted: 2017-12-13

## 2017-12-13 LAB
ALBUMIN SERPL-MCNC: 2.5 G/DL (ref 3.4–5)
ALBUMIN SERPL-MCNC: 4 G/DL (ref 3.4–5)
ALBUMIN UR-MCNC: NEGATIVE MG/DL
ALP SERPL-CCNC: 172 U/L (ref 40–150)
ALP SERPL-CCNC: 281 U/L (ref 40–150)
ALT SERPL W P-5'-P-CCNC: 106 U/L (ref 0–50)
ALT SERPL W P-5'-P-CCNC: 152 U/L (ref 0–50)
AMPHETAMINES UR QL SCN: NEGATIVE
ANION GAP SERPL CALCULATED.3IONS-SCNC: 15 MMOL/L (ref 3–14)
ANION GAP SERPL CALCULATED.3IONS-SCNC: 16 MMOL/L (ref 3–14)
ANISOCYTOSIS BLD QL SMEAR: SLIGHT
APAP SERPL-MCNC: 20 MG/L (ref 10–20)
APAP SERPL-MCNC: 27 MG/L (ref 10–20)
APAP SERPL-MCNC: 39 MG/L (ref 10–20)
APPEARANCE UR: CLEAR
AST SERPL W P-5'-P-CCNC: 128 U/L (ref 0–45)
AST SERPL W P-5'-P-CCNC: 273 U/L (ref 0–45)
BARBITURATES UR QL: NEGATIVE
BASE DEFICIT BLDA-SCNC: 10.2 MMOL/L
BASOPHILS # BLD AUTO: 0.3 10E9/L (ref 0–0.2)
BASOPHILS NFR BLD AUTO: 2 %
BENZODIAZ UR QL: NEGATIVE
BILIRUB DIRECT SERPL-MCNC: 0.2 MG/DL (ref 0–0.2)
BILIRUB SERPL-MCNC: 0.6 MG/DL (ref 0.2–1.3)
BILIRUB SERPL-MCNC: 1 MG/DL (ref 0.2–1.3)
BILIRUB UR QL STRIP: NEGATIVE
BUN SERPL-MCNC: 16 MG/DL (ref 7–30)
BUN SERPL-MCNC: 18 MG/DL (ref 7–30)
CA-I BLD-MCNC: 6.5 MG/DL (ref 4.4–5.2)
CALCIUM SERPL-MCNC: 11.2 MG/DL (ref 8.5–10.1)
CALCIUM SERPL-MCNC: 9.6 MG/DL (ref 8.5–10.1)
CANNABINOIDS UR QL SCN: POSITIVE
CHLORIDE SERPL-SCNC: 105 MMOL/L (ref 94–109)
CHLORIDE SERPL-SCNC: 115 MMOL/L (ref 94–109)
CO2 BLDCOV-SCNC: 18 MMOL/L (ref 21–28)
CO2 BLDCOV-SCNC: 26 MMOL/L (ref 21–28)
CO2 SERPL-SCNC: 15 MMOL/L (ref 20–32)
CO2 SERPL-SCNC: 20 MMOL/L (ref 20–32)
COCAINE UR QL: NEGATIVE
COLOR UR AUTO: YELLOW
CREAT SERPL-MCNC: 1.09 MG/DL (ref 0.52–1.04)
CREAT SERPL-MCNC: 1.22 MG/DL (ref 0.52–1.04)
DIFFERENTIAL METHOD BLD: ABNORMAL
EOSINOPHIL # BLD AUTO: 0.5 10E9/L (ref 0–0.7)
EOSINOPHIL NFR BLD AUTO: 3 %
ERYTHROCYTE [DISTWIDTH] IN BLOOD BY AUTOMATED COUNT: 17.1 % (ref 10–15)
ETHANOL SERPL-MCNC: 0.17 G/DL
GFR SERPL CREATININE-BSD FRML MDRD: 47 ML/MIN/1.7M2
GFR SERPL CREATININE-BSD FRML MDRD: 54 ML/MIN/1.7M2
GLUCOSE BLDC GLUCOMTR-MCNC: 109 MG/DL (ref 70–99)
GLUCOSE BLDC GLUCOMTR-MCNC: 122 MG/DL (ref 70–99)
GLUCOSE BLDC GLUCOMTR-MCNC: 162 MG/DL (ref 70–99)
GLUCOSE BLDC GLUCOMTR-MCNC: 165 MG/DL (ref 70–99)
GLUCOSE BLDC GLUCOMTR-MCNC: 225 MG/DL (ref 70–99)
GLUCOSE BLDC GLUCOMTR-MCNC: 247 MG/DL (ref 70–99)
GLUCOSE BLDC GLUCOMTR-MCNC: 249 MG/DL (ref 70–99)
GLUCOSE BLDC GLUCOMTR-MCNC: 266 MG/DL (ref 70–99)
GLUCOSE BLDC GLUCOMTR-MCNC: 76 MG/DL (ref 70–99)
GLUCOSE BLDC GLUCOMTR-MCNC: 99 MG/DL (ref 70–99)
GLUCOSE SERPL-MCNC: 107 MG/DL (ref 70–99)
GLUCOSE SERPL-MCNC: 77 MG/DL (ref 70–99)
GLUCOSE UR STRIP-MCNC: NEGATIVE MG/DL
HCG UR QL: NEGATIVE
HCO3 BLD-SCNC: 17 MMOL/L (ref 21–28)
HCT VFR BLD AUTO: 41.9 % (ref 35–47)
HGB BLD-MCNC: 14.3 G/DL (ref 11.7–15.7)
HGB UR QL STRIP: ABNORMAL
INR PPP: 1.05 (ref 0.86–1.14)
INTERPRETATION ECG - MUSE: NORMAL
KETONES UR STRIP-MCNC: NEGATIVE MG/DL
LACTATE BLD-SCNC: 4 MMOL/L (ref 0.7–2.1)
LACTATE BLD-SCNC: 5.2 MMOL/L (ref 0.7–2.1)
LACTATE BLD-SCNC: 5.4 MMOL/L (ref 0.7–2)
LACTATE BLD-SCNC: 8.5 MMOL/L (ref 0.7–2)
LACTATE SERPL-SCNC: 7.8 MMOL/L (ref 0.4–2)
LEUKOCYTE ESTERASE UR QL STRIP: NEGATIVE
LYMPHOCYTES # BLD AUTO: 5.1 10E9/L (ref 0.8–5.3)
LYMPHOCYTES NFR BLD AUTO: 33 %
MCH RBC QN AUTO: 30.8 PG (ref 26.5–33)
MCHC RBC AUTO-ENTMCNC: 34.1 G/DL (ref 31.5–36.5)
MCV RBC AUTO: 90 FL (ref 78–100)
MONOCYTES # BLD AUTO: 1.7 10E9/L (ref 0–1.3)
MONOCYTES NFR BLD AUTO: 11 %
MUCOUS THREADS #/AREA URNS LPF: PRESENT /LPF
MYELOCYTES # BLD: 0.3 10E9/L
MYELOCYTES NFR BLD MANUAL: 2 %
NEUTROPHILS # BLD AUTO: 7.6 10E9/L (ref 1.6–8.3)
NEUTROPHILS NFR BLD AUTO: 49 %
NITRATE UR QL: NEGATIVE
O2/TOTAL GAS SETTING VFR VENT: ABNORMAL %
OPIATES UR QL SCN: NEGATIVE
OXYHGB MFR BLD: 97 % (ref 92–100)
PCO2 BLD: 41 MM HG (ref 35–45)
PCO2 BLDV: 39 MM HG (ref 40–50)
PCO2 BLDV: 44 MM HG (ref 40–50)
PCP UR QL SCN: NEGATIVE
PH BLD: 7.22 PH (ref 7.35–7.45)
PH BLDV: 7.23 PH (ref 7.32–7.43)
PH BLDV: 7.44 PH (ref 7.32–7.43)
PH UR STRIP: 6 PH (ref 5–7)
PLATELET # BLD AUTO: 390 10E9/L (ref 150–450)
PLATELET # BLD EST: ABNORMAL 10*3/UL
PO2 BLD: 193 MM HG (ref 80–105)
PO2 BLDV: 35 MM HG (ref 25–47)
PO2 BLDV: 59 MM HG (ref 25–47)
POTASSIUM SERPL-SCNC: 2.7 MMOL/L (ref 3.4–5.3)
POTASSIUM SERPL-SCNC: 2.9 MMOL/L (ref 3.4–5.3)
PROT SERPL-MCNC: 5.2 G/DL (ref 6.8–8.8)
PROT SERPL-MCNC: 7.6 G/DL (ref 6.8–8.8)
RBC # BLD AUTO: 4.64 10E12/L (ref 3.8–5.2)
RBC #/AREA URNS AUTO: 3 /HPF (ref 0–2)
SALICYLATES SERPL-MCNC: <2 MG/DL
SAO2 % BLDV FROM PO2: 69 %
SAO2 % BLDV FROM PO2: 85 %
SODIUM SERPL-SCNC: 140 MMOL/L (ref 133–144)
SODIUM SERPL-SCNC: 146 MMOL/L (ref 133–144)
SOURCE: ABNORMAL
SP GR UR STRIP: 1.01 (ref 1–1.03)
SQUAMOUS #/AREA URNS AUTO: <1 /HPF (ref 0–1)
TROPONIN I SERPL-MCNC: <0.015 UG/L (ref 0–0.04)
TSH SERPL DL<=0.005 MIU/L-ACNC: 5.06 MU/L (ref 0.4–4)
UROBILINOGEN UR STRIP-MCNC: 0 MG/DL (ref 0–2)
WBC # BLD AUTO: 15.6 10E9/L (ref 4–11)
WBC #/AREA URNS AUTO: 2 /HPF (ref 0–2)

## 2017-12-13 PROCEDURE — 82330 ASSAY OF CALCIUM: CPT | Performed by: HOSPITALIST

## 2017-12-13 PROCEDURE — 5A1955Z RESPIRATORY VENTILATION, GREATER THAN 96 CONSECUTIVE HOURS: ICD-10-PCS | Performed by: SURGERY

## 2017-12-13 PROCEDURE — 20000004 ZZH R&B ICU UMMC

## 2017-12-13 PROCEDURE — 84443 ASSAY THYROID STIM HORMONE: CPT | Performed by: EMERGENCY MEDICINE

## 2017-12-13 PROCEDURE — 31500 INSERT EMERGENCY AIRWAY: CPT

## 2017-12-13 PROCEDURE — 25000125 ZZHC RX 250: Performed by: EMERGENCY MEDICINE

## 2017-12-13 PROCEDURE — 25000128 H RX IP 250 OP 636: Performed by: STUDENT IN AN ORGANIZED HEALTH CARE EDUCATION/TRAINING PROGRAM

## 2017-12-13 PROCEDURE — 85610 PROTHROMBIN TIME: CPT | Performed by: EMERGENCY MEDICINE

## 2017-12-13 PROCEDURE — 40000281 ZZH STATISTIC TRANSPORT TIME EA 15 MIN

## 2017-12-13 PROCEDURE — 80048 BASIC METABOLIC PNL TOTAL CA: CPT

## 2017-12-13 PROCEDURE — 80076 HEPATIC FUNCTION PANEL: CPT | Performed by: HOSPITALIST

## 2017-12-13 PROCEDURE — 85025 COMPLETE CBC W/AUTO DIFF WBC: CPT | Performed by: STUDENT IN AN ORGANIZED HEALTH CARE EDUCATION/TRAINING PROGRAM

## 2017-12-13 PROCEDURE — 25800025 ZZH RX 258: Performed by: EMERGENCY MEDICINE

## 2017-12-13 PROCEDURE — 99223 1ST HOSP IP/OBS HIGH 75: CPT | Mod: AI | Performed by: HOSPITALIST

## 2017-12-13 PROCEDURE — 82803 BLOOD GASES ANY COMBINATION: CPT | Performed by: STUDENT IN AN ORGANIZED HEALTH CARE EDUCATION/TRAINING PROGRAM

## 2017-12-13 PROCEDURE — 80053 COMPREHEN METABOLIC PANEL: CPT | Performed by: EMERGENCY MEDICINE

## 2017-12-13 PROCEDURE — 96372 THER/PROPH/DIAG INJ SC/IM: CPT

## 2017-12-13 PROCEDURE — 36600 WITHDRAWAL OF ARTERIAL BLOOD: CPT

## 2017-12-13 PROCEDURE — 93005 ELECTROCARDIOGRAM TRACING: CPT

## 2017-12-13 PROCEDURE — 36556 INSERT NON-TUNNEL CV CATH: CPT

## 2017-12-13 PROCEDURE — 85610 PROTHROMBIN TIME: CPT | Performed by: STUDENT IN AN ORGANIZED HEALTH CARE EDUCATION/TRAINING PROGRAM

## 2017-12-13 PROCEDURE — 96375 TX/PRO/DX INJ NEW DRUG ADDON: CPT

## 2017-12-13 PROCEDURE — 82803 BLOOD GASES ANY COMBINATION: CPT

## 2017-12-13 PROCEDURE — 93005 ELECTROCARDIOGRAM TRACING: CPT | Mod: 76

## 2017-12-13 PROCEDURE — 25000128 H RX IP 250 OP 636

## 2017-12-13 PROCEDURE — 80307 DRUG TEST PRSMV CHEM ANLYZR: CPT | Performed by: EMERGENCY MEDICINE

## 2017-12-13 PROCEDURE — 96365 THER/PROPH/DIAG IV INF INIT: CPT

## 2017-12-13 PROCEDURE — 99291 CRITICAL CARE FIRST HOUR: CPT | Mod: 25 | Performed by: INTERNAL MEDICINE

## 2017-12-13 PROCEDURE — 83605 ASSAY OF LACTIC ACID: CPT | Performed by: HOSPITALIST

## 2017-12-13 PROCEDURE — 99151 MOD SED SAME PHYS/QHP <5 YRS: CPT

## 2017-12-13 PROCEDURE — 83605 ASSAY OF LACTIC ACID: CPT

## 2017-12-13 PROCEDURE — 40000986 XR CHEST PORT 1 VW

## 2017-12-13 PROCEDURE — 00000146 ZZHCL STATISTIC GLUCOSE BY METER IP

## 2017-12-13 PROCEDURE — 25800025 ZZH RX 258: Performed by: HOSPITALIST

## 2017-12-13 PROCEDURE — 70450 CT HEAD/BRAIN W/O DYE: CPT

## 2017-12-13 PROCEDURE — 76942 ECHO GUIDE FOR BIOPSY: CPT

## 2017-12-13 PROCEDURE — 25000131 ZZH RX MED GY IP 250 OP 636 PS 637: Mod: GY | Performed by: EMERGENCY MEDICINE

## 2017-12-13 PROCEDURE — 96361 HYDRATE IV INFUSION ADD-ON: CPT

## 2017-12-13 PROCEDURE — 84484 ASSAY OF TROPONIN QUANT: CPT | Performed by: EMERGENCY MEDICINE

## 2017-12-13 PROCEDURE — 3E043XZ INTRODUCTION OF VASOPRESSOR INTO CENTRAL VEIN, PERCUTANEOUS APPROACH: ICD-10-PCS | Performed by: HOSPITALIST

## 2017-12-13 PROCEDURE — 80329 ANALGESICS NON-OPIOID 1 OR 2: CPT | Performed by: EMERGENCY MEDICINE

## 2017-12-13 PROCEDURE — 5A1935Z RESPIRATORY VENTILATION, LESS THAN 24 CONSECUTIVE HOURS: ICD-10-PCS | Performed by: HOSPITALIST

## 2017-12-13 PROCEDURE — 40000275 ZZH STATISTIC RCP TIME EA 10 MIN

## 2017-12-13 PROCEDURE — 25000128 H RX IP 250 OP 636: Performed by: EMERGENCY MEDICINE

## 2017-12-13 PROCEDURE — 85025 COMPLETE CBC W/AUTO DIFF WBC: CPT | Performed by: EMERGENCY MEDICINE

## 2017-12-13 PROCEDURE — 99285 EMERGENCY DEPT VISIT HI MDM: CPT | Mod: 25

## 2017-12-13 PROCEDURE — 80053 COMPREHEN METABOLIC PANEL: CPT | Performed by: STUDENT IN AN ORGANIZED HEALTH CARE EDUCATION/TRAINING PROGRAM

## 2017-12-13 PROCEDURE — 36415 COLL VENOUS BLD VENIPUNCTURE: CPT | Performed by: HOSPITALIST

## 2017-12-13 PROCEDURE — 82805 BLOOD GASES W/O2 SATURATION: CPT | Performed by: EMERGENCY MEDICINE

## 2017-12-13 PROCEDURE — 81001 URINALYSIS AUTO W/SCOPE: CPT | Performed by: EMERGENCY MEDICINE

## 2017-12-13 PROCEDURE — 96376 TX/PRO/DX INJ SAME DRUG ADON: CPT

## 2017-12-13 PROCEDURE — 25000131 ZZH RX MED GY IP 250 OP 636 PS 637: Mod: GY | Performed by: STUDENT IN AN ORGANIZED HEALTH CARE EDUCATION/TRAINING PROGRAM

## 2017-12-13 PROCEDURE — 87641 MR-STAPH DNA AMP PROBE: CPT | Performed by: INTERNAL MEDICINE

## 2017-12-13 PROCEDURE — 20000003 ZZH R&B ICU

## 2017-12-13 PROCEDURE — 3E043XZ INTRODUCTION OF VASOPRESSOR INTO CENTRAL VEIN, PERCUTANEOUS APPROACH: ICD-10-PCS | Performed by: SURGERY

## 2017-12-13 PROCEDURE — 93010 ELECTROCARDIOGRAM REPORT: CPT | Performed by: INTERNAL MEDICINE

## 2017-12-13 PROCEDURE — 71010 XR CHEST PORT 1 VW: CPT

## 2017-12-13 PROCEDURE — 83605 ASSAY OF LACTIC ACID: CPT | Performed by: STUDENT IN AN ORGANIZED HEALTH CARE EDUCATION/TRAINING PROGRAM

## 2017-12-13 PROCEDURE — 96366 THER/PROPH/DIAG IV INF ADDON: CPT

## 2017-12-13 PROCEDURE — 83605 ASSAY OF LACTIC ACID: CPT | Performed by: EMERGENCY MEDICINE

## 2017-12-13 PROCEDURE — 25800025 ZZH RX 258: Performed by: INTERNAL MEDICINE

## 2017-12-13 PROCEDURE — 84100 ASSAY OF PHOSPHORUS: CPT | Performed by: STUDENT IN AN ORGANIZED HEALTH CARE EDUCATION/TRAINING PROGRAM

## 2017-12-13 PROCEDURE — 80320 DRUG SCREEN QUANTALCOHOLS: CPT | Performed by: EMERGENCY MEDICINE

## 2017-12-13 PROCEDURE — 36620 INSERTION CATHETER ARTERY: CPT | Performed by: INTERNAL MEDICINE

## 2017-12-13 PROCEDURE — 25000128 H RX IP 250 OP 636: Performed by: HOSPITALIST

## 2017-12-13 PROCEDURE — 81025 URINE PREGNANCY TEST: CPT | Performed by: EMERGENCY MEDICINE

## 2017-12-13 PROCEDURE — 82330 ASSAY OF CALCIUM: CPT | Performed by: STUDENT IN AN ORGANIZED HEALTH CARE EDUCATION/TRAINING PROGRAM

## 2017-12-13 PROCEDURE — 25000125 ZZHC RX 250: Performed by: HOSPITALIST

## 2017-12-13 PROCEDURE — 94002 VENT MGMT INPAT INIT DAY: CPT

## 2017-12-13 PROCEDURE — 80329 ANALGESICS NON-OPIOID 1 OR 2: CPT | Performed by: HOSPITALIST

## 2017-12-13 PROCEDURE — 87640 STAPH A DNA AMP PROBE: CPT | Performed by: INTERNAL MEDICINE

## 2017-12-13 RX ORDER — NOREPINEPHRINE BITARTRATE/D5W 16MG/250ML
0.03-0.4 PLASTIC BAG, INJECTION (ML) INTRAVENOUS CONTINUOUS
Status: DISCONTINUED | OUTPATIENT
Start: 2017-12-13 | End: 2017-12-13 | Stop reason: HOSPADM

## 2017-12-13 RX ORDER — NICOTINE POLACRILEX 4 MG
15-30 LOZENGE BUCCAL
Status: DISCONTINUED | OUTPATIENT
Start: 2017-12-13 | End: 2017-12-21

## 2017-12-13 RX ORDER — MIDAZOLAM (PF) 1 MG/ML IN 0.9 % SODIUM CHLORIDE INTRAVENOUS SOLUTION
1-8 CONTINUOUS
Status: DISCONTINUED | OUTPATIENT
Start: 2017-12-13 | End: 2017-12-13 | Stop reason: HOSPADM

## 2017-12-13 RX ORDER — MAGNESIUM SULFATE HEPTAHYDRATE 40 MG/ML
4 INJECTION, SOLUTION INTRAVENOUS EVERY 4 HOURS PRN
Status: DISCONTINUED | OUTPATIENT
Start: 2017-12-13 | End: 2017-12-13 | Stop reason: HOSPADM

## 2017-12-13 RX ORDER — BISACODYL 10 MG
10 SUPPOSITORY, RECTAL RECTAL DAILY PRN
Status: DISCONTINUED | OUTPATIENT
Start: 2017-12-13 | End: 2017-12-19

## 2017-12-13 RX ORDER — MAGNESIUM SULFATE 1 G/100ML
1 INJECTION INTRAVENOUS DAILY PRN
Status: DISCONTINUED | OUTPATIENT
Start: 2017-12-13 | End: 2018-01-05

## 2017-12-13 RX ORDER — POTASSIUM CHLORIDE 29.8 MG/ML
20 INJECTION INTRAVENOUS ONCE
Status: COMPLETED | OUTPATIENT
Start: 2017-12-13 | End: 2017-12-13

## 2017-12-13 RX ORDER — KETAMINE HYDROCHLORIDE 10 MG/ML
100 INJECTION, SOLUTION INTRAMUSCULAR; INTRAVENOUS ONCE
Status: COMPLETED | OUTPATIENT
Start: 2017-12-13 | End: 2017-12-13

## 2017-12-13 RX ORDER — SODIUM CHLORIDE, SODIUM LACTATE, POTASSIUM CHLORIDE, CALCIUM CHLORIDE 600; 310; 30; 20 MG/100ML; MG/100ML; MG/100ML; MG/100ML
1000 INJECTION, SOLUTION INTRAVENOUS CONTINUOUS
Status: DISCONTINUED | OUTPATIENT
Start: 2017-12-13 | End: 2017-12-13

## 2017-12-13 RX ORDER — MAGNESIUM SULFATE HEPTAHYDRATE 40 MG/ML
4 INJECTION, SOLUTION INTRAVENOUS DAILY PRN
Status: DISCONTINUED | OUTPATIENT
Start: 2017-12-13 | End: 2018-01-05

## 2017-12-13 RX ORDER — DEXTROSE 50 G/100ML
100 INJECTION, SOLUTION INTRAVENOUS CONTINUOUS
Status: DISCONTINUED | OUTPATIENT
Start: 2017-12-13 | End: 2017-12-13 | Stop reason: HOSPADM

## 2017-12-13 RX ORDER — FENTANYL CITRATE 50 UG/ML
INJECTION, SOLUTION INTRAMUSCULAR; INTRAVENOUS
Status: COMPLETED
Start: 2017-12-13 | End: 2017-12-13

## 2017-12-13 RX ORDER — DEXTROSE 20 G/100ML
INJECTION, SOLUTION INTRAVENOUS CONTINUOUS
Status: DISCONTINUED | OUTPATIENT
Start: 2017-12-13 | End: 2017-12-13 | Stop reason: HOSPADM

## 2017-12-13 RX ORDER — PROPOFOL 10 MG/ML
5-75 INJECTION, EMULSION INTRAVENOUS CONTINUOUS
Status: DISCONTINUED | OUTPATIENT
Start: 2017-12-13 | End: 2017-12-13

## 2017-12-13 RX ORDER — LORAZEPAM 2 MG/ML
2 INJECTION INTRAMUSCULAR ONCE
Status: COMPLETED | OUTPATIENT
Start: 2017-12-13 | End: 2017-12-13

## 2017-12-13 RX ORDER — POTASSIUM CHLORIDE 29.8 MG/ML
20 INJECTION INTRAVENOUS ONCE
Status: COMPLETED | OUTPATIENT
Start: 2017-12-14 | End: 2017-12-14

## 2017-12-13 RX ORDER — POTASSIUM CHLORIDE 1500 MG/1
20-40 TABLET, EXTENDED RELEASE ORAL
Status: DISCONTINUED | OUTPATIENT
Start: 2017-12-13 | End: 2017-12-13 | Stop reason: HOSPADM

## 2017-12-13 RX ORDER — FENTANYL CITRATE 50 UG/ML
25-50 INJECTION, SOLUTION INTRAMUSCULAR; INTRAVENOUS
Status: DISCONTINUED | OUTPATIENT
Start: 2017-12-13 | End: 2017-12-13 | Stop reason: HOSPADM

## 2017-12-13 RX ORDER — DEXTROSE MONOHYDRATE 25 G/50ML
25-50 INJECTION, SOLUTION INTRAVENOUS
Status: DISCONTINUED | OUTPATIENT
Start: 2017-12-13 | End: 2017-12-13 | Stop reason: HOSPADM

## 2017-12-13 RX ORDER — DEXTROSE MONOHYDRATE 100 MG/ML
INJECTION, SOLUTION INTRAVENOUS CONTINUOUS
Status: DISCONTINUED | OUTPATIENT
Start: 2017-12-13 | End: 2017-12-13 | Stop reason: HOSPADM

## 2017-12-13 RX ORDER — NALOXONE HYDROCHLORIDE 0.4 MG/ML
.1-.4 INJECTION, SOLUTION INTRAMUSCULAR; INTRAVENOUS; SUBCUTANEOUS
Status: DISCONTINUED | OUTPATIENT
Start: 2017-12-13 | End: 2017-12-13 | Stop reason: HOSPADM

## 2017-12-13 RX ORDER — POTASSIUM CHLORIDE 29.8 MG/ML
20 INJECTION INTRAVENOUS
Status: DISCONTINUED | OUTPATIENT
Start: 2017-12-13 | End: 2017-12-13

## 2017-12-13 RX ORDER — DEXTROSE MONOHYDRATE 25 G/50ML
25 INJECTION, SOLUTION INTRAVENOUS EVERY 30 MIN PRN
Status: DISCONTINUED | OUTPATIENT
Start: 2017-12-13 | End: 2017-12-13 | Stop reason: HOSPADM

## 2017-12-13 RX ORDER — MIRTAZAPINE 15 MG/1
TABLET, FILM COATED ORAL
Qty: 60 TABLET | Refills: 0 | Status: ON HOLD | OUTPATIENT
Start: 2017-12-13 | End: 2018-01-04

## 2017-12-13 RX ORDER — DEXTROSE MONOHYDRATE 25 G/50ML
25 INJECTION, SOLUTION INTRAVENOUS ONCE
Status: COMPLETED | OUTPATIENT
Start: 2017-12-13 | End: 2017-12-13

## 2017-12-13 RX ORDER — POTASSIUM CHLORIDE 7.45 MG/ML
10 INJECTION INTRAVENOUS
Status: DISCONTINUED | OUTPATIENT
Start: 2017-12-13 | End: 2017-12-13 | Stop reason: RX

## 2017-12-13 RX ORDER — DEXTROSE MONOHYDRATE 25 G/50ML
25 INJECTION, SOLUTION INTRAVENOUS
Status: DISCONTINUED | OUTPATIENT
Start: 2017-12-13 | End: 2017-12-13 | Stop reason: HOSPADM

## 2017-12-13 RX ORDER — METHYLPREDNISOLONE SODIUM SUCCINATE 125 MG/2ML
80 INJECTION, POWDER, LYOPHILIZED, FOR SOLUTION INTRAMUSCULAR; INTRAVENOUS ONCE
Status: COMPLETED | OUTPATIENT
Start: 2017-12-13 | End: 2017-12-13

## 2017-12-13 RX ORDER — NALOXONE HYDROCHLORIDE 0.4 MG/ML
.1-.4 INJECTION, SOLUTION INTRAMUSCULAR; INTRAVENOUS; SUBCUTANEOUS
Status: DISCONTINUED | OUTPATIENT
Start: 2017-12-13 | End: 2017-12-13

## 2017-12-13 RX ORDER — MIDAZOLAM (PF) 1 MG/ML IN 0.9 % SODIUM CHLORIDE INTRAVENOUS SOLUTION
1-8 CONTINUOUS
Status: DISCONTINUED | OUTPATIENT
Start: 2017-12-13 | End: 2017-12-14

## 2017-12-13 RX ORDER — POTASSIUM CHLORIDE 29.8 MG/ML
20 INJECTION INTRAVENOUS
Status: DISCONTINUED | OUTPATIENT
Start: 2017-12-13 | End: 2017-12-13 | Stop reason: HOSPADM

## 2017-12-13 RX ORDER — POTASSIUM CL/LIDO/0.9 % NACL 10MEQ/0.1L
10 INTRAVENOUS SOLUTION, PIGGYBACK (ML) INTRAVENOUS
Status: DISCONTINUED | OUTPATIENT
Start: 2017-12-13 | End: 2017-12-13 | Stop reason: ALTCHOICE

## 2017-12-13 RX ORDER — POTASSIUM CHLORIDE 1.5 G/1.58G
20-40 POWDER, FOR SOLUTION ORAL
Status: DISCONTINUED | OUTPATIENT
Start: 2017-12-13 | End: 2017-12-13 | Stop reason: HOSPADM

## 2017-12-13 RX ORDER — CALCIUM CHLORIDE 100 MG/ML
1 INJECTION INTRAVENOUS; INTRAVENTRICULAR
Status: DISCONTINUED | OUTPATIENT
Start: 2017-12-13 | End: 2017-12-13 | Stop reason: HOSPADM

## 2017-12-13 RX ORDER — AMOXICILLIN 250 MG
1 CAPSULE ORAL 2 TIMES DAILY PRN
Status: DISCONTINUED | OUTPATIENT
Start: 2017-12-13 | End: 2017-12-14

## 2017-12-13 RX ORDER — POTASSIUM CHLORIDE 29.8 MG/ML
20 INJECTION INTRAVENOUS
Status: DISCONTINUED | OUTPATIENT
Start: 2017-12-13 | End: 2018-01-05

## 2017-12-13 RX ORDER — AMOXICILLIN 250 MG
2 CAPSULE ORAL 2 TIMES DAILY PRN
Status: DISCONTINUED | OUTPATIENT
Start: 2017-12-13 | End: 2017-12-14

## 2017-12-13 RX ORDER — POTASSIUM CHLORIDE 1500 MG/1
20-40 TABLET, EXTENDED RELEASE ORAL
Status: DISCONTINUED | OUTPATIENT
Start: 2017-12-13 | End: 2017-12-13 | Stop reason: ALTCHOICE

## 2017-12-13 RX ORDER — DEXTROSE MONOHYDRATE 25 G/50ML
25 INJECTION, SOLUTION INTRAVENOUS
Status: DISCONTINUED | OUTPATIENT
Start: 2017-12-13 | End: 2017-12-20

## 2017-12-13 RX ORDER — POTASSIUM CL/LIDO/0.9 % NACL 10MEQ/0.1L
10 INTRAVENOUS SOLUTION, PIGGYBACK (ML) INTRAVENOUS
Status: DISCONTINUED | OUTPATIENT
Start: 2017-12-13 | End: 2017-12-13 | Stop reason: HOSPADM

## 2017-12-13 RX ORDER — POTASSIUM CHLORIDE 750 MG/1
20-40 TABLET, EXTENDED RELEASE ORAL
Status: DISCONTINUED | OUTPATIENT
Start: 2017-12-13 | End: 2018-01-05

## 2017-12-13 RX ORDER — POTASSIUM CHLORIDE 7.45 MG/ML
10 INJECTION INTRAVENOUS
Status: DISCONTINUED | OUTPATIENT
Start: 2017-12-13 | End: 2017-12-13 | Stop reason: HOSPADM

## 2017-12-13 RX ORDER — CALCIUM CHLORIDE 100 MG/ML
1 INJECTION INTRAVENOUS; INTRAVENTRICULAR ONCE
Status: COMPLETED | OUTPATIENT
Start: 2017-12-13 | End: 2017-12-13

## 2017-12-13 RX ORDER — DEXTROSE MONOHYDRATE 25 G/50ML
25 INJECTION, SOLUTION INTRAVENOUS EVERY 30 MIN PRN
Status: DISCONTINUED | OUTPATIENT
Start: 2017-12-13 | End: 2017-12-20

## 2017-12-13 RX ORDER — LORAZEPAM 2 MG/ML
INJECTION INTRAMUSCULAR
Status: COMPLETED
Start: 2017-12-13 | End: 2017-12-13

## 2017-12-13 RX ORDER — POTASSIUM CHLORIDE 7.45 MG/ML
10 INJECTION INTRAVENOUS
Status: DISCONTINUED | OUTPATIENT
Start: 2017-12-13 | End: 2017-12-13 | Stop reason: ALTCHOICE

## 2017-12-13 RX ORDER — NALOXONE HYDROCHLORIDE 0.4 MG/ML
.1-.4 INJECTION, SOLUTION INTRAMUSCULAR; INTRAVENOUS; SUBCUTANEOUS
Status: DISCONTINUED | OUTPATIENT
Start: 2017-12-13 | End: 2018-01-05 | Stop reason: HOSPADM

## 2017-12-13 RX ORDER — DEXTROSE MONOHYDRATE 25 G/50ML
25-50 INJECTION, SOLUTION INTRAVENOUS
Status: DISCONTINUED | OUTPATIENT
Start: 2017-12-13 | End: 2017-12-21

## 2017-12-13 RX ORDER — NICOTINE POLACRILEX 4 MG
15-30 LOZENGE BUCCAL
Status: DISCONTINUED | OUTPATIENT
Start: 2017-12-13 | End: 2017-12-13 | Stop reason: HOSPADM

## 2017-12-13 RX ORDER — PROCHLORPERAZINE 25 MG
25 SUPPOSITORY, RECTAL RECTAL EVERY 12 HOURS PRN
Status: DISCONTINUED | OUTPATIENT
Start: 2017-12-13 | End: 2017-12-13 | Stop reason: HOSPADM

## 2017-12-13 RX ORDER — DEXTROSE 50 G/100ML
INJECTION, SOLUTION INTRAVENOUS CONTINUOUS
Status: DISCONTINUED | OUTPATIENT
Start: 2017-12-13 | End: 2017-12-13

## 2017-12-13 RX ORDER — PROCHLORPERAZINE MALEATE 5 MG
10 TABLET ORAL EVERY 6 HOURS PRN
Status: DISCONTINUED | OUTPATIENT
Start: 2017-12-13 | End: 2017-12-13 | Stop reason: HOSPADM

## 2017-12-13 RX ORDER — EPINEPHRINE 1 MG/ML
0.3 INJECTION, SOLUTION, CONCENTRATE INTRAVENOUS ONCE
Status: COMPLETED | OUTPATIENT
Start: 2017-12-13 | End: 2017-12-13

## 2017-12-13 RX ORDER — POTASSIUM CHLORIDE 1.5 G/1.58G
20-40 POWDER, FOR SOLUTION ORAL
Status: DISCONTINUED | OUTPATIENT
Start: 2017-12-13 | End: 2017-12-13 | Stop reason: ALTCHOICE

## 2017-12-13 RX ORDER — POTASSIUM CHLORIDE 1.5 G/1.58G
20-40 POWDER, FOR SOLUTION ORAL
Status: DISCONTINUED | OUTPATIENT
Start: 2017-12-13 | End: 2018-01-05

## 2017-12-13 RX ORDER — DIPHENHYDRAMINE HYDROCHLORIDE 50 MG/ML
25 INJECTION INTRAMUSCULAR; INTRAVENOUS ONCE
Status: COMPLETED | OUTPATIENT
Start: 2017-12-13 | End: 2017-12-13

## 2017-12-13 RX ORDER — DEXTROSE 20 G/100ML
INJECTION, SOLUTION INTRAVENOUS CONTINUOUS
Status: DISCONTINUED | OUTPATIENT
Start: 2017-12-13 | End: 2017-12-14

## 2017-12-13 RX ADMIN — SODIUM CHLORIDE 1000 ML: 9 INJECTION, SOLUTION INTRAVENOUS at 12:43

## 2017-12-13 RX ADMIN — DEXTROSE MONOHYDRATE 25 G: 25 INJECTION, SOLUTION INTRAVENOUS at 16:17

## 2017-12-13 RX ADMIN — METHYLPREDNISOLONE SODIUM SUCCINATE 81.25 MG: 125 INJECTION, POWDER, FOR SOLUTION INTRAMUSCULAR; INTRAVENOUS at 14:42

## 2017-12-13 RX ADMIN — PANTOPRAZOLE SODIUM 40 MG: 40 INJECTION, POWDER, FOR SOLUTION INTRAVENOUS at 18:19

## 2017-12-13 RX ADMIN — DEXTROSE AND SODIUM CHLORIDE: 10; .45 INJECTION, SOLUTION INTRAVENOUS at 19:23

## 2017-12-13 RX ADMIN — KETAMINE HYDROCHLORIDE 100 MG: 10 INJECTION INTRAMUSCULAR; INTRAVENOUS at 17:00

## 2017-12-13 RX ADMIN — EPINEPHRINE 0.3 MG: 1 INJECTION INTRAMUSCULAR; INTRAVENOUS; SUBCUTANEOUS at 13:48

## 2017-12-13 RX ADMIN — Medication 50 MCG/HR: at 18:49

## 2017-12-13 RX ADMIN — Medication 3 MG/HR: at 23:47

## 2017-12-13 RX ADMIN — CALCIUM CHLORIDE 1 G: 100 INJECTION INTRAVENOUS; INTRAVENTRICULAR at 13:28

## 2017-12-13 RX ADMIN — ACETYLCYSTEINE 2.62 G: 200 INJECTION, SOLUTION INTRAVENOUS at 17:02

## 2017-12-13 RX ADMIN — POTASSIUM CHLORIDE 20 MEQ: 29.8 INJECTION, SOLUTION INTRAVENOUS at 21:44

## 2017-12-13 RX ADMIN — CALCIUM CHLORIDE 1 G: 100 INJECTION INTRAVENOUS; INTRAVENTRICULAR at 15:01

## 2017-12-13 RX ADMIN — MIDAZOLAM 5 MG: 1 INJECTION INTRAMUSCULAR; INTRAVENOUS at 16:48

## 2017-12-13 RX ADMIN — DEXTROSE 100 ML/HR: 50 INJECTION, SOLUTION INTRAVENOUS at 21:45

## 2017-12-13 RX ADMIN — SODIUM CHLORIDE 1 UNITS/KG/HR: 9 INJECTION, SOLUTION INTRAVENOUS at 16:43

## 2017-12-13 RX ADMIN — SODIUM BICARBONATE 50 MEQ: 84 INJECTION, SOLUTION INTRAVENOUS at 16:29

## 2017-12-13 RX ADMIN — LORAZEPAM 2 MG: 2 INJECTION INTRAMUSCULAR at 15:42

## 2017-12-13 RX ADMIN — PROPOFOL 10 MCG/KG/MIN: 10 INJECTION, EMULSION INTRAVENOUS at 16:05

## 2017-12-13 RX ADMIN — SODIUM BICARBONATE 25 MEQ: 84 INJECTION, SOLUTION INTRAVENOUS at 12:43

## 2017-12-13 RX ADMIN — Medication 2 G: at 15:53

## 2017-12-13 RX ADMIN — FENTANYL CITRATE 100 MCG: 50 INJECTION, SOLUTION INTRAMUSCULAR; INTRAVENOUS at 16:09

## 2017-12-13 RX ADMIN — Medication 0.03 MCG/KG/MIN: at 14:19

## 2017-12-13 RX ADMIN — MIDAZOLAM 5 MG: 1 INJECTION INTRAMUSCULAR; INTRAVENOUS at 16:53

## 2017-12-13 RX ADMIN — DEXMEDETOMIDINE HYDROCHLORIDE 0.2 MCG/KG/HR: 100 INJECTION, SOLUTION INTRAVENOUS at 16:54

## 2017-12-13 RX ADMIN — SODIUM CHLORIDE 500 ML: 9 INJECTION, SOLUTION INTRAVENOUS at 18:12

## 2017-12-13 RX ADMIN — CALCIUM CHLORIDE 1 G: 100 INJECTION INTRAVENOUS; INTRAVENTRICULAR at 16:23

## 2017-12-13 RX ADMIN — SODIUM CHLORIDE, POTASSIUM CHLORIDE, SODIUM LACTATE AND CALCIUM CHLORIDE 1000 ML: 600; 310; 30; 20 INJECTION, SOLUTION INTRAVENOUS at 16:40

## 2017-12-13 RX ADMIN — SODIUM CHLORIDE 5 UNITS/KG/HR: 9 INJECTION, SOLUTION INTRAVENOUS at 23:48

## 2017-12-13 RX ADMIN — INSULIN HUMAN 52.2 UNITS: 100 INJECTION, SOLUTION PARENTERAL at 16:28

## 2017-12-13 RX ADMIN — FENTANYL CITRATE 50 MCG: 50 INJECTION, SOLUTION INTRAMUSCULAR; INTRAVENOUS at 18:09

## 2017-12-13 RX ADMIN — ACETYLCYSTEINE 5.22 G: 200 INJECTION, SOLUTION INTRAVENOUS at 21:07

## 2017-12-13 RX ADMIN — SODIUM CHLORIDE 1000 ML: 9 INJECTION, SOLUTION INTRAVENOUS at 11:45

## 2017-12-13 RX ADMIN — SODIUM CHLORIDE 1000 ML: 9 INJECTION, SOLUTION INTRAVENOUS at 12:22

## 2017-12-13 RX ADMIN — GLUCAGON HYDROCHLORIDE 3 MG: KIT at 13:33

## 2017-12-13 RX ADMIN — Medication 10 MEQ: at 15:53

## 2017-12-13 RX ADMIN — Medication 1 MG/HR: at 18:50

## 2017-12-13 RX ADMIN — SODIUM CHLORIDE, POTASSIUM CHLORIDE, SODIUM LACTATE AND CALCIUM CHLORIDE 2000 ML: 600; 310; 30; 20 INJECTION, SOLUTION INTRAVENOUS at 14:49

## 2017-12-13 RX ADMIN — DEXTROSE MONOHYDRATE 50 ML: 25 INJECTION, SOLUTION INTRAVENOUS at 21:00

## 2017-12-13 RX ADMIN — DEXTROSE: 20 INJECTION, SOLUTION INTRAVENOUS at 16:20

## 2017-12-13 RX ADMIN — DIPHENHYDRAMINE HYDROCHLORIDE 25 MG: 50 INJECTION, SOLUTION INTRAMUSCULAR; INTRAVENOUS at 13:48

## 2017-12-13 RX ADMIN — MIDAZOLAM 5 MG: 5 INJECTION INTRAMUSCULAR; INTRAVENOUS at 15:52

## 2017-12-13 RX ADMIN — ACETYLCYSTEINE 7.46 G: 200 INJECTION, SOLUTION INTRAVENOUS at 13:34

## 2017-12-13 ASSESSMENT — ENCOUNTER SYMPTOMS: CONFUSION: 1

## 2017-12-13 NOTE — IP AVS SNAPSHOT
[address] [address]    Phone:  [phone]                                       After Visit Summary   12/13/2017    Ana Laura Wharton    MRN: [id number]           After Visit Summary Signature Page     I have received my discharge instructions, and my questions have been answered. I have discussed any challenges I see with this plan with the nurse or doctor.    ..........................................................................................................................................  Patient/Patient Representative Signature      ..........................................................................................................................................  Patient Representative Print Name and Relationship to Patient    ..................................................               ................................................  Date                                            Time    ..........................................................................................................................................  Reviewed by Signature/Title    ...................................................              ..............................................  Date                                                            Time

## 2017-12-13 NOTE — IP AVS SNAPSHOT
[address] [facility]: [phone]                                              INTERAGENCY TRANSFER FORM - PHYSICIAN ORDERS   2017                    Hospital Admission Date: 2017  KEE TINEO   : 1970  Sex: Female        Attending Provider: (none)    Allergies:  Amoxicillin-pot Clavulanate, Azithromycin, Cephalexin, Ciprofloxacin, Compazine [Prochlorperazine], Metoclopramide, Minocycline, Penicillins, Reglan [Metoclopramide Hcl], Restoril [Temazepam], Thorazine [Chlorpromazine], Abilify [Aripiprazole], Lamotrigine, Sulfa Drugs    Infection:  None   Service:  INTERNAL MED    Ht:  --   Wt:  47.3 kg (104 lb 4.8 oz)   Admission Wt:  62.3 kg (137 lb 5.6 oz)    BMI:  --   BSA:  --            Patient PCP Information     Provider PCP Type    Ander Fuchs MD General      ED Clinical Impression     Diagnosis Description Comment Added By Time Added    Cough [R05] Cough [R05]  Negin Mathias PA-C 2018  2:49 PM    Paroxysmal atrial fibrillation (H) [I48.0] Paroxysmal atrial fibrillation (H) [I48.0]  Negin Mathias PA-C 2018  2:49 PM    Essential hypertension [I10] Essential hypertension [I10]  Negin Mathias PA-C 2018  2:50 PM    Physical deconditioning [R53.81] Physical deconditioning [R53.81]  Ngein Mathias PA-C 2018  2:50 PM    Alcoholic intoxication with complication (H) [F10.929] Alcoholic intoxication with complication (H) [F10.929]  Negin Mathias PA-C 2018  2:50 PM    Bipolar I disorder (H) [F31.9] Bipolar I disorder (H) [F31.9]  Negin Mathias PA-C 2018  2:52 PM    Clostridium difficile colitis [A04.72] Clostridium difficile colitis [A04.72]  Negin Mathias PA-C 2018  2:55 PM      Hospital Problems as of 2018              Priority Class Noted POA    Anxiety disorder Medium  1/10/2013 Yes    Overdose Medium  3/27/2015 Yes    Bipolar I disorder (H) Medium  2015 Yes     Calcium channel blocker overdose Medium  12/17/2017 Unknown    Intentional acetaminophen overdose (H) Medium  12/17/2017 Unknown    Alcoholic intoxication with complication (H) Medium  12/17/2017 Unknown    Hypotension due to medication Medium  12/17/2017 Unknown    Acute renal failure, unspecified acute renal failure type (H) Medium  12/17/2017 Unknown    Paroxysmal atrial fibrillation (H) Medium  12/17/2017 Unknown    Acute respiratory failure with hypoxia (H) Medium  12/17/2017 Unknown    Acute pulmonary edema (H) Medium  12/17/2017 Unknown    Spontaneous pneumothorax Medium  Unknown Yes      Non-Hospital Problems as of 1/5/2018              Priority Class Noted    Anxiety state Medium  3/19/2003    Other bipolar disorders Medium  3/19/2003    Intractable migraine Medium  3/3/2005    Pure hypercholesterolemia Medium  3/3/2006    Esophageal reflux Medium  3/7/2008    Opioid dependence (H) Medium  2/11/2010    Dysfunctions associated with sleep stages or arousal from sleep Medium  3/9/2010    Alternating exotropia Medium  9/26/2010    Tear film insufficiency Medium  9/26/2010    Orofacial dyskinesia Medium  10/19/2010    Impulse control disorder Medium  6/21/2011    Depressed bipolar I disorder in full remission (H) Medium  12/18/2012    Myopia Medium  6/14/2013    Suicide ideation Medium  4/4/2014    Low back pain Medium  5/21/2014    Bipolar disorder current episode depressed (H) Medium  7/10/2014    Depression Medium  3/28/2015    Mood disorder (H) Medium  3/28/2015    Other specified hypothyroidism Medium  4/1/2015    Suicidal ideations Medium  4/10/2015    Screening for malignant neoplasm of cervix Medium  5/5/2015    Abnormal finding of blood chemistry Medium  7/8/2015    Essential hypertension Medium  7/8/2015    Acute renal failure (H) Medium  8/3/2015    Acidosis Medium  8/3/2015    Poisoning by 4-aminophenol derivatives, undetermined intent Medium  8/3/2015    Personality disorder Medium  9/14/2015     Left knee pain Medium  10/26/2016    Aftercare following surgery of the musculoskeletal system Medium  10/26/2016    UTI (urinary tract infection) Medium  1/24/2017    Acute cystitis Medium  1/25/2017    Clostridium difficile colitis Medium  3/10/2017    Drug overdose Medium  12/13/2017      Code Status History     Date Active Date Inactive Code Status Order ID Comments User Context    1/4/2018  3:11 PM  Full Code [id number]  Negin Mathias PA-C Outpatient    12/13/2017 10:53 PM 1/4/2018  3:11 PM Full Code [id number]  Macey Avina MD Inpatient    12/13/2017  5:57 PM 12/13/2017 10:53 PM Full Code [id number]  Laura Lo MD Inpatient    3/14/2017  4:54 PM 12/13/2017  5:57 PM Full Code [id number]  Luis Segal PA-C Outpatient    3/10/2017  1:47 AM 3/14/2017  4:54 PM Full Code [id number]  Pb Velasquez MD Inpatient    3/9/2017  3:04 AM 3/10/2017  1:47 AM Full Code [id number]  Mecca Soria MD Inpatient    1/29/2017 12:51 PM 3/9/2017  3:04 AM Full Code [id number]  Laura Lo MD Outpatient    1/24/2017  9:46 AM 1/29/2017 12:51 PM Full Code [id number]  Iliana Raphael PA-C ED    4/10/2015  4:55 PM 4/14/2015  3:50 PM Full Code [id number]  Hattie Fishman DO Inpatient    3/28/2015  7:12 PM 3/31/2015  4:50 PM Full Code [id number]  Alexa Goodwin RN Inpatient    3/27/2015  7:43 PM 3/28/2015  7:12 PM Full Code [id number]  Nghia Barber MD Inpatient    7/10/2014  7:51 PM 7/15/2014  5:57 PM Full Code [id number]  Sandeep Hernandez MD Inpatient    4/4/2014  2:37 PM 4/11/2014 10:32 PM Full Code [id number]  Isabela Craig MD Inpatient    12/18/2012  6:45 PM 12/24/2012  2:17 PM Full Code [id number]  Spring Medina MD Inpatient    2/12/2012  5:54 PM 2/17/2012  1:05 PM Full Code [id number]  Pelon Castillo, RN Inpatient    10/6/2011 10:07 PM 10/14/2011 10:30 PM Full Code [id number]  Evy Cheney RN Inpatient    9/26/2011  8:48 PM 10/3/2011  8:09 PM Full  Code [id number]  Doris Dial RN Inpatient         Medication Review      START taking        Dose / Directions Comments    benzonatate 100 MG capsule   Commonly known as:  TESSALON   Used for:  Cough        Dose:  100 mg   Take 1 capsule (100 mg) by mouth 3 times daily as needed for cough   Quantity:  42 capsule   Refills:  0        carvedilol 6.25 MG tablet   Commonly known as:  COREG   Used for:  Paroxysmal atrial fibrillation (H)        Dose:  6.25 mg   1 tablet (6.25 mg) by Oral or Feeding Tube route 2 times daily   Quantity:  60 tablet   Refills:  0        cloNIDine 0.1 MG tablet   Commonly known as:  CATAPRES   Used for:  Essential hypertension        Dose:  0.1 mg   Take 1 tablet (0.1 mg) by mouth 3 times daily   Quantity:  60 tablet   Refills:  0        enoxaparin 40 MG/0.4ML injection   Commonly known as:  LOVENOX   Used for:  Physical deconditioning        Dose:  40 mg   Inject 0.4 mLs (40 mg) Subcutaneous every 24 hours   Refills:  0        folic acid 1 MG tablet   Commonly known as:  FOLVITE   Used for:  Alcoholic intoxication with complication (H)        Dose:  1 mg   Take 1 tablet (1 mg) by mouth daily   Quantity:  30 tablet   Refills:  0        LORazepam 1 MG tablet   Commonly known as:  ATIVAN   Used for:  Bipolar I disorder (H)        take 1-2mg every 8 hours as needed.   Quantity:  60 tablet   Refills:  0        thiamine 100 MG tablet   Used for:  Alcoholic intoxication with complication (H)        Dose:  100 mg   Take 1 tablet (100 mg) by mouth daily   Refills:  0          CONTINUE these medications which may have CHANGED, or have new prescriptions. If we are uncertain of the size of tablets/capsules you have at home, strength may be listed as something that might have changed.        Dose / Directions Comments    mirtazapine 15 MG tablet   Commonly known as:  REMERON   This may have changed:    - how much to take  - how to take this  - when to take this  - additional instructions   Used for:   Bipolar I disorder (H)        Dose:  15 mg   Take 1 tablet (15 mg) by mouth At Bedtime   Quantity:  30 tablet   Refills:  0        vancomycin 50 mg/mL Liqd solution   Commonly known as:  VANOCIN   Indication:  Clostridium difficile Bacteria   This may have changed:  additional instructions   Used for:  Clostridium difficile colitis        Take 125mg QID for 13 days, then take 125mg TID for 7 days, then take 125mg BID for 7 days, then take 125mg QD x 7d, then take 125mg QOD x 7d.   Refills:  0          CONTINUE these medications which have NOT CHANGED        Dose / Directions Comments    IBUPROFEN PO        Dose:  400-600 mg   Take 400-600 mg by mouth every 6 hours as needed for moderate pain   Refills:  0        OLANZapine 5 MG tablet   Commonly known as:  zyPREXA   Used for:  Bipolar I disorder (H)        Dose:  5 mg   Take 1 tablet (5 mg) by mouth 2 times daily May take extra dose prn paranoia.   Quantity:  180 tablet   Refills:  0        PROTONIX PO        Dose:  40 mg   Take 40 mg by mouth every morning (before breakfast)   Refills:  0        SYNTHROID PO        Dose:  50 mcg   Take 50 mcg by mouth daily   Refills:  0        ZOFRAN ODT PO        Dose:  4 mg   Take 4 mg by mouth every 8 hours as needed for nausea   Refills:  0          STOP taking     clindamycin 1 % lotion   Commonly known as:  CLEOCIN T           clonazePAM 1 MG tablet   Commonly known as:  klonoPIN           desogestrel-ethinyl estradiol 0.15-30 MG-MCG per tablet   Commonly known as:  APRI           hydrOXYzine 50 MG tablet   Commonly known as:  ATARAX           HYDROXYZINE HCL PO           oxyCODONE-acetaminophen 5-325 MG per tablet   Commonly known as:  PERCOCET           RELPAX PO           TIZANIDINE HCL PO           TORADOL IM           triamcinolone 0.1 % cream   Commonly known as:  KENALOG           TYLENOL 8 HOUR PO                     Further instructions from your care team       Disability Linkage Line  [phone]  *They can  answer questions about Medicare and how to apply for Medicare part B.    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 ICU course c/b lactic acidosis, acute hypoxic/hypercarbic respiratory failure 2/2 aspiration requiring intubation c/b ARDS, ARF, severe hypotension/vasogenic shock requiring pressors, paroxysmal A.fib, intermittent SVT, and sepsis 2/2 sinusitis. Patient extubated 12/24/17, transferred to medicine 12/25 for ongoing care. She did well once transferred to medicine floor. Her suicidal ideation resolved and she did well once bedside attendant removed. Her c.diff improved and was well controlled on vancomycin. Her main ongoing issue remained deconditioning. She was discharged to [facility] on 1/4/18.             After Care     Activity - Up with nursing assistance           Additional Discharge Instructions       - Continue aggressive pulmonary toilet and acapella  - Continue vancomycin taper as ordered: 125mg qid x 13days, 125mg TID x 7d, 125mg BID x 7d, 125mg QD x 7d, 125mg QOD x 7d  - If patient needs any additional antibiotics, need to discuss increasing vancomycin with primary Gi provider (Dr Dennis)       Advance Diet as Tolerated       Follow this diet upon discharge: regular diet, ensure plus between meals       Fall precautions           General info for SNF       Length of Stay Estimate: Short Term Care: Estimated # of Days <30  Condition at Discharge: Improving  Level of care:skilled   Rehabilitation Potential: Good  Admission H&P remains valid and up-to-date: Yes  Recent Chemotherapy: N/A  Use Nursing Home Standing Orders: Yes       Mantoux instructions       Give  two-step Mantoux (PPD) Per Facility Policy Yes       Wound care (specify)       Cheek wound: wash BID with saline and gauze and gently rub vaseline on cheek wounds  Left forearm: cleanse with saline solution             Referrals     Occupational Therapy Adult Consult       Evaluate and treat as clinically indicated.    Reason:  deconditioning       Physical Therapy Adult Consult       Evaluate and treat as clinically indicated.    Reason:  deconditioning             Other Orders     Contact Isolation                 Follow-Up Appointment Instructions     Future Labs/Procedures    Follow Up and recommended labs and tests     Comments:    - Have routine CMP and CBC checked every Monday while at [facility].  - Needs repeat TSH, T4 on 1/8/17 with dose adjustments to be made to synthroid if needed  - Needs to continue to follow with psychiatry as needed while at [facility]. [facility]  - Needs to follow up with GI (once discharge from [facility], Dr Dennis) for monitoring and treatment of C.Diff  - Needs to follow up with PCP once discharge from [facility]  - Needs biopsy fundus of stomach as outpatient once discharge from [facility]      Follow-Up Appointment Instructions     Follow Up and recommended labs and tests       - Have routine CMP and CBC checked every Monday while at [facility].  - Needs repeat TSH, T4 on 1/8/17 with dose adjustments to be made to synthroid if needed  - [facility]eds to [facility] to [facility]  - Needs to follow up with GI (once discharge from [facility], Dr Dennis) for monitoring and treatment of C.Diff  - Needs to follow up with PCP once discharge from [facility]  - Needs biopsy fundus of stomach as outpatient [facility]             Statement of Approval     Ordered          01/05/18 1838  I have reviewed and agree with all the recommendations and orders detailed in  this document.  EFFECTIVE NOW     Approved and electronically signed by:  Negin Mathias PA-C           01/04/18 1512  I have reviewed and agree with all the recommendations and orders detailed in this document.  EFFECTIVE NOW     Approved and electronically signed by:  Negin Mathias PA-C

## 2017-12-13 NOTE — ED NOTES
Pt arrives via EMS, found unresponsive friend called PD when pt stopped communicating during a phone call. Pt may have ingested 10-60 Amlodipine Besylate 5 mg tablets, empty nyquil bottle and alcohol bottles found in pt residence, pt unresponsive hypotensive 77/50 at scene.

## 2017-12-13 NOTE — IP AVS SNAPSHOT
MRN:[id number]                      After Visit Summary   12/13/2017    Ana Laura Wharton    MRN: [id number]           Thank you!     Thank you for choosing [facility] for your care. Our goal is always to provide you with excellent care. Hearing back from our patients is one way we can continue to improve our services. Please take a few minutes to complete the written survey that you may receive in the mail after you visit with us. Thank you!        Patient Information     Date Of Birth          1970        Designated Caregiver       Most Recent Value    Caregiver    Will someone help with your care after discharge? 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 ICU course c/b lactic acidosis, acute hypoxic/hypercarbic respiratory failure 2/2 aspiration requiring intubation c/b ARDS, ARF, severe hypotension/vasogenic shock requiring pressors, paroxysmal A.fib, intermittent SVT, and sepsis 2/2 sinusitis. Patient extubated 12/24/17, transferred to medicine 12/25 for ongoing care. She did well once transferred to medicine floor. Her suicidal ideation resolved and she did well once bedside attendant removed. Her c.diff improved and was well controlled on vancomycin. Her main ongoing issue remained deconditioning. [facility].                  Who to Call     For medical emergencies, please call 911.  For non-urgent questions about your medical care, please call your primary care  provider or clinic, [phone]          Attending Provider     Provider Specialty    Nomi Serna MD Surgery    [facility], Sly WAYNE MD Internal Medicine    Nando Goldstein MD Internal Medicine    Kerry Lopez MD Internal Medicine       Primary Care Provider Office Phone # Fax #    nAder Fuchs [phone]      After Care Instructions     Activity - Up with nursing assistance           Additional Discharge Instructions       - Continue aggressive pulmonary toilet and acapella  - Continue vancomycin taper as ordered: 125mg qid x 13days, 125mg TID x 7d, 125mg BID x 7d, 125mg QD x 7d, 125mg QOD x 7d  - If patient needs any additional antibiotics, need to discuss increasing vancomycin with primary Gi provider (Dr Dennis)            Advance Diet as Tolerated       Follow this diet upon discharge: regular diet, ensure plus between meals            Fall precautions           General info for SNF       Length of Stay Estimate: Short Term Care: Estimated # of Days <30  Condition at Discharge: Improving  Level of care:skilled   Rehabilitation Potential: Good  Admission H&P remains valid and up-to-date: Yes  Recent Chemotherapy: N/A  Use Nursing Home Standing Orders: Yes            Mantoux instructions       Give two-step Mantoux (PPD) Per Facility Policy Yes            Wound care (specify)       Cheek wound: wash BID with saline and gauze and gently rub vaseline on cheek wounds  Left forearm: cleanse with saline solution                  Follow-up Appointments     Follow Up and recommended labs and tests       - Have routine CMP and CBC checked every Monday while at [facility].  - Needs repeat TSH, T4 on 1/8/17 with dose adjustments to be made to synthroid if needed  - Needs to continue to follow with psychiatry as needed while at [facility]. Will need intensive day program treatment on discharge from [facility]  - Needs to follow up with GI (once discharge from [facility], Dr Dennis) for monitoring and treatment of  C.Diff  - Needs to follow up with PCP once discharge from [facility]  - Needs biopsy fundus of stomach as outpatient once discharge from [facility]                  Additional Services     Occupational Therapy Adult Consult       Evaluate and treat as clinically indicated.    Reason:  deconditioning            Physical Therapy Adult Consult       Evaluate and treat as clinically indicated.    Reason:  deconditioning                  Future tests that were ordered for you     Contact Isolation                 Further instructions from your care team       Disability Linkage Line  [phone]  *They can answer questions about Medicare and how to apply for Medicare part B.    Pending Results     Date and Time Order Name Status Description    1/2/2018 2115 Blood culture Preliminary     1/2/2018 2115 Blood culture Preliminary     12/26/2017 0437 Magnesium In process     12/25/2017 2200 Phosphorus In process     12/25/2017 2200 Comprehensive metabolic panel In process             Statement of Approval     Ordered          01/05/18 1838  I have reviewed and agree with all the recommendations and orders detailed in this document.  EFFECTIVE NOW     Approved and electronically signed by:  Negin Mathias PA-C           01/04/18 6033  I have reviewed and agree with all the recommendations and orders detailed in this document.  EFFECTIVE NOW     Approved and electronically signed by:  Negin Mathias PA-C             Admission Information     Date & Time Department Dept. Phone    12/13/2017 [address] [facility] [phone]      Your Vitals Were     Blood Pressure Pulse Temperature Respirations Weight Pulse Oximetry    128/77 83 97.8  F (36.6  C) (Oral) 18 47.3 kg (104 lb 4.8 oz) 96%    BMI (Body Mass Index)                   18.48 kg/m2           [facility] Information     [employer] gives you secure access to your electronic health record. If you see a primary care provider, you can also send messages to your care  team and make appointments. If you have questions, please call your primary care clinic.  If you do not have a primary care provider, please call [phone] and they will assist you.        Care EveryWhere ID     This is your Care EveryWhere ID. This could be used by other organizations to access your [facility] medical records  [id number]        Equal Access to Services     OLGA LUTZ : Hadneeta avendano hadelmao [place], waaxda luqadaha, qaybta kaalmada talon, jaspal oscar . So [facility] [phone].    ATENCIÓN: Si habla español, tiene a gold disposición servicios gratuitos de asistencia lingüística. Llame al [phone].    We comply with applicable federal civil rights laws and [place] laws. We do not discriminate on the basis of race, color, national origin, age, disability, sex, sexual orientation, or gender identity.               Review of your medicines      START taking        Dose / Directions    benzonatate 100 MG capsule   Commonly known as:  TESSALON   Used for:  Cough        Dose:  100 mg   Take 1 capsule (100 mg) by mouth 3 times daily as needed for cough   Quantity:  42 capsule   Refills:  0       carvedilol 6.25 MG tablet   Commonly known as:  COREG   Used for:  Paroxysmal atrial fibrillation (H)        Dose:  6.25 mg   1 tablet (6.25 mg) by Oral or Feeding Tube route 2 times daily   Quantity:  60 tablet   Refills:  0       cloNIDine 0.1 MG tablet   Commonly known as:  CATAPRES   Used for:  Essential hypertension        Dose:  0.1 mg   Take 1 tablet (0.1 mg) by mouth 3 times daily   Quantity:  60 tablet   Refills:  0       enoxaparin 40 MG/0.4ML injection   Commonly known as:  LOVENOX   Used for:  Physical deconditioning        Dose:  40 mg   Inject 0.4 mLs (40 mg) Subcutaneous every 24 hours   Refills:  0       folic acid 1 MG tablet   Commonly known as:  FOLVITE   Used for:  Alcoholic intoxication with complication (H)        Dose:  1 mg   Take 1 tablet (1 mg) by  mouth daily   Quantity:  30 tablet   Refills:  0       LORazepam 1 MG tablet   Commonly known as:  ATIVAN   Used for:  Bipolar I disorder (H)        take 1-2mg every 8 hours as needed.   Quantity:  60 tablet   Refills:  0       thiamine 100 MG tablet   Used for:  Alcoholic intoxication with complication (H)        Dose:  100 mg   Take 1 tablet (100 mg) by mouth daily   Refills:  0         CONTINUE these medicines which may have CHANGED, or have new prescriptions. If we are uncertain of the size of tablets/capsules you have at home, strength may be listed as something that might have changed.        Dose / Directions    mirtazapine 15 MG tablet   Commonly known as:  REMERON   This may have changed:    - how much to take  - how to take this  - when to take this  - additional instructions   Used for:  Bipolar I disorder (H)        Dose:  15 mg   Take 1 tablet (15 mg) by mouth At Bedtime   Quantity:  30 tablet   Refills:  0       vancomycin 50 mg/mL Liqd solution   Commonly known as:  VANOCIN   Indication:  Clostridium difficile Bacteria   This may have changed:  additional instructions   Used for:  Clostridium difficile colitis        Take 125mg QID for 13 days, then take 125mg TID for 7 days, then take 125mg BID for 7 days, then take 125mg QD x 7d, then take 125mg QOD x 7d.   Refills:  0         CONTINUE these medicines which have NOT CHANGED        Dose / Directions    IBUPROFEN PO        Dose:  400-600 mg   Take 400-600 mg by mouth every 6 hours as needed for moderate pain   Refills:  0       OLANZapine 5 MG tablet   Commonly known as:  zyPREXA   Used for:  Bipolar I disorder (H)        Dose:  5 mg   Take 1 tablet (5 mg) by mouth 2 times daily May take extra dose prn paranoia.   Quantity:  180 tablet   Refills:  0       PROTONIX PO        Dose:  40 mg   Take 40 mg by mouth every morning (before breakfast)   Refills:  0       SYNTHROID PO        Dose:  50 mcg   Take 50 mcg by mouth daily   Refills:  0       ZOFRAN  ODT PO        Dose:  4 mg   Take 4 mg by mouth every 8 hours as needed for nausea   Refills:  0         STOP taking     clindamycin 1 % lotion   Commonly known as:  CLEOCIN T           clonazePAM 1 MG tablet   Commonly known as:  klonoPIN           desogestrel-ethinyl estradiol 0.15-30 MG-MCG per tablet   Commonly known as:  APRI           hydrOXYzine 50 MG tablet   Commonly known as:  ATARAX           HYDROXYZINE HCL PO           oxyCODONE-acetaminophen 5-325 MG per tablet   Commonly known as:  PERCOCET           RELPAX PO           TIZANIDINE HCL PO           TORADOL IM           triamcinolone 0.1 % cream   Commonly known as:  KENALOG           TYLENOL 8 HOUR PO                Where to get your medicines      Some of these will need a paper prescription and others can be bought over the counter. Ask your nurse if you have questions.     You don't need a prescription for these medications     benzonatate 100 MG capsule    carvedilol 6.25 MG tablet    cloNIDine 0.1 MG tablet    enoxaparin 40 MG/0.4ML injection    folic acid 1 MG tablet    mirtazapine 15 MG tablet    thiamine 100 MG tablet    vancomycin 50 mg/mL Liqd solution         Information about where to get these medications is not yet available     ! Ask your nurse or doctor about these medications     LORazepam 1 MG tablet               ANTIBIOTIC INSTRUCTION     You've Been Prescribed an Antibiotic - Now What?  Your healthcare team thinks that you or your loved one might have an infection. Some infections can be treated with antibiotics, which are powerful, life-saving drugs. Like all medications, antibiotics have side effects and should only be used when necessary. There are some important things you should know about your antibiotic treatment.      Your healthcare team may run tests before you start taking an antibiotic.    Your team may take samples (e.g., from your blood, urine or other areas) to run tests to look for bacteria. These test can be  important to determine if you need an antibiotic at all and, if you do, which antibiotic will work best.      Within a few days, your healthcare team might change or even stop your antibiotic.    Your team may start you on an antibiotic while they are working to find out what is making you sick.    Your team might change your antibiotic because test results show that a different antibiotic would be better to treat your infection.    In some cases, once your team has more information, they learn that you do not need an antibiotic at all. They may find out that you don't have an infection, or that the antibiotic you're taking won't work against your infection. For example, an infection caused by a virus can't be treated with antibiotics. Staying on an antibiotic when you don't need it is more likely to be harmful than helpful.      You may experience side effects from your antibiotic.    Like all medications, antibiotics have side effects. Some of these can be serious.    Let you healthcare team know if you have any known allergies when you are admitted to the hospital.    One significant side effect of nearly all antibiotics is the risk of severe and sometimes deadly diarrhea caused by Clostridium difficile (C. Difficile). This occurs when a person takes antibiotics because some good germs are destroyed. Antibiotic use allows C. diificile to take over, putting patients at high risk for this serious infection.    As a patient or caregiver, it is important to understand your or your loved one's antibiotic treatment. It is especially important for caregivers to speak up when patients can't speak for themselves. Here are some important questions to ask your healthcare team.    What infection is this antibiotic treating and how do you know I have that infection?    What side effects might occur from this antibiotic?    How long will I need to take this antibiotic?    Is it safe to take this antibiotic with other  medications or supplements (e.g., vitamins) that I am taking?     Are there any special directions I need to know about taking this antibiotic? For example, should I take it with food?    How will I be monitored to know whether my infection is responding to the antibiotic?    What tests may help to make sure the right antibiotic is prescribed for me?      Information provided by:  www.cdc.gov/getsmart  U.S. Department of Health and Human Services  Centers for disease Control and Prevention  National Center for Emerging and Zoonotic Infectious Diseases  Division of Healthcare Quality Promotion         Protect others around you: Learn how to safely use, store and throw away your medicines at www.disposemymeds.org.             Medication List: This is a list of all your medications and when to take them. Check marks below indicate your daily home schedule. Keep this list as a reference.      Medications           Morning Afternoon Evening Bedtime As Needed    benzonatate 100 MG capsule   Commonly known as:  TESSALON   Take 1 capsule (100 mg) by mouth 3 times daily as needed for cough   Last time this was given:  100 mg on 1/2/2018  8:05 AM                                carvedilol 6.25 MG tablet   Commonly known as:  COREG   1 tablet (6.25 mg) by Oral or Feeding Tube route 2 times daily   Last time this was given:  6.25 mg on 1/5/2018  8:35 AM                                cloNIDine 0.1 MG tablet   Commonly known as:  CATAPRES   Take 1 tablet (0.1 mg) by mouth 3 times daily   Last time this was given:  0.1 mg on 1/5/2018  1:53 PM                                enoxaparin 40 MG/0.4ML injection   Commonly known as:  LOVENOX   Inject 0.4 mLs (40 mg) Subcutaneous every 24 hours   Last time this was given:  40 mg on 1/5/2018  1:51 PM                                folic acid 1 MG tablet   Commonly known as:  FOLVITE   Take 1 tablet (1 mg) by mouth daily   Last time this was given:  1 mg on 1/5/2018  8:35 AM                                 IBUPROFEN PO   Take 400-600 mg by mouth every 6 hours as needed for moderate pain   Last time this was given:  600 mg on 1/5/2018  8:33 AM                                LORazepam 1 MG tablet   Commonly known as:  ATIVAN   take 1-2mg every 8 hours as needed.   Last time this was given:  1 mg on 1/5/2018  5:38 PM                                mirtazapine 15 MG tablet   Commonly known as:  REMERON   Take 1 tablet (15 mg) by mouth At Bedtime   Last time this was given:  15 mg on 1/4/2018  7:51 PM                                OLANZapine 5 MG tablet   Commonly known as:  zyPREXA   Take 1 tablet (5 mg) by mouth 2 times daily May take extra dose prn paranoia.                                PROTONIX PO   Take 40 mg by mouth every morning (before breakfast)   Last time this was given:  40 mg on 1/5/2018  8:34 AM                                SYNTHROID PO   Take 50 mcg by mouth daily   Last time this was given:  50 mcg on 1/5/2018  8:35 AM                                thiamine 100 MG tablet   Take 1 tablet (100 mg) by mouth daily   Last time this was given:  100 mg on 1/5/2018  8:33 AM                                vancomycin 50 mg/mL Liqd solution   Commonly known as:  VANOCIN   Take 125mg QID for 13 days, then take 125mg TID for 7 days, then take 125mg BID for 7 days, then take 125mg QD x 7d, then take 125mg QOD x 7d.   Last time this was given:  125 mg on 1/5/2018  4:24 PM                                ZOFRAN ODT PO   Take 4 mg by mouth every 8 hours as needed for nausea

## 2017-12-13 NOTE — ED NOTES
Atropine 0.5 mg IV - 1153  Ketamine 150 MG- 1154  Rocuronium 80 mg -1154    IVF pressure bag x 2 peripheral IV lines BP 98/61  1156  Color change good, bilateral breath sounds  23cm lip 7.5 ETT  , 100% spO2, 10 RR,     1 mg glucagon- 1203   calcium chloride 1 g -1159    OG 55 cm at ETT -1201  /

## 2017-12-13 NOTE — PROGRESS NOTES
Respiratory Therapy    Patient intubated with a 7.5 ETT secured at 23cm @ Te[facility], good color change on EtCO2 detector, and bilateral breath sounds auscultated. Placed onto mechanical ventilator settings as follows:  Ventilation Mode: CMV/AC  FiO2 (%): 50 %  Rate Set (breaths/minute): 14 breaths/min  Tidal Volume Set (mL): 450 mL  PEEP (cm H2O): 5 cmH2O  Oxygen Concentration (%): 50 %  Resp: 31    Will continue to assess and monitor.    Shaneka Ruiz RRT  12/13/2017

## 2017-12-13 NOTE — ED NOTES
Bed: ED33  Expected date: 12/13/17  Expected time:   Means of arrival:   Comments:  BV-1  40F  ETA 5

## 2017-12-13 NOTE — PROGRESS NOTES
Respiratory Therapy    ABG drawn from patient's right radial artery with no complications.    Shaneka Ruiz RRT  12/13/2017

## 2017-12-13 NOTE — H&P
[facility]  Hospitalist Admission Note  Name: Ana Laura Wharton    MRN: [id number]  YOB: 1970    Age: 47 year old  Date of admission: 12/13/2017              Brief patient summary: Pt is a 47 yr old female with h/o bipolar d/o , polysubstance abuse, depression with previous suicide attempts who was admitted on 12/13/2017 with AMS 2/2 intentional drug overdose. She is thought to have ingested unknown quantity of amlodipine, nyquil and vodka . She was intubated in the ER for airway protection          Assessment and Plan:   Intentional suicide attempt with drug overdose (amliodipine, nyquil, vodka), intubated for airway protection:  poison control called and recommended treating for elevated tylenol levels  With NAC and CCB toxicity with high dose insulin   --intensivist consult  --cont vent support, sedation with versed and fentanyl     Elevated tylenol levels: probably from nyquil OD. No severe hepatotoxicity (AST/ALT<1000)  --cont NAC for 21 hrs   --cont to monitor tylenol levels , LFTs and INR     Hypotension : due to amlodipine overdose.   --cont high dose insulin and dextrose  --cont levophed     EDUARDO: likely due to hypoperfusion. Received 5 L IVF bolus in the ER. Cont IVF and pressors. Cont to monitor renal function and UOP     Lactic acidosis (5.4->4->7): suspect due to hypoperfusion. If LA not improving , would obtain abdominal CT to r/o intraabdominal catastrophe     H/o C. Difficile colitis.  Complex history including fecal transplant.  no diarrhea currently-avoid abx unless absolutely necessary     Bipolar illness : psych consult    Polysubstance abuse (alcohol, THC).       GERD - PPI    Hypokalemia: on potassium replacement protocol    DVT Prophylaxis: Pneumatic Compression Devices  Discharge Dispo: inpatient psych likely   Estimated Disch Date / # of Days until Disch: TBD  Full Code    Chief Complaint: AMS         History of Present Illness:   Discussed with ER physician :   Sonny Wharton is a 47 year old female with a history of suicidal ideation, bipolar disease, hypertension, and hyperlipidemia who presents to the ED via EMS for evaluation of a drug overdose. EMS reports that the patient called her friend in [place] this morning, and during the conversation, she stopped responding and EMS was called. When EMS located her apartment, she was able to stand and move but was altered and groggy. There were 2 empty bottles of amlodipine near her, a 1/4 filled bottle of Nyquil, and an empty 75 mL bottle of vodka in her kitchen. EMS notes a heart rate of 110, with a blood pressure of 77/50, a blood sugar of 141 and dilated pupils en route. She opened her eyes to a sternal rub by EMS. Her friend notes that she has been having some comments recently about not wanting to live. Of note, her  says he has been in the middle of a divorce with her, but he has not wanted to leave her with her mental instability.              Past Medical History:     Patient Active Problem List   Diagnosis     Depressed bipolar I disorder in full remission (H)     Anxiety disorder     Suicide ideation     Bipolar disorder current episode depressed (H)     Overdose     Depression     Mood disorder (H)     Suicidal ideations     Bipolar I disorder (H)     Acute renal failure (H)     Alternating exotropia     Anxiety state     Other bipolar disorders     Personality disorder     Tear film insufficiency     Dysfunctions associated with sleep stages or arousal from sleep     Abnormal finding of blood chemistry     Esophageal reflux     Hypothyroidism     Impulse control disorder     Acidosis     Low back pain     Myopia     Opioid dependence (H)     Orofacial dyskinesia     Intractable migraine     Screening for malignant neoplasm of cervix     Pure hypercholesterolemia     Poisoning by 4-aminophenol derivatives, undetermined intent     Essential hypertension     Left knee pain     Aftercare following  surgery of the musculoskeletal system     UTI (urinary tract infection)     Acute cystitis     Clostridium difficile colitis      Past Medical History:   Diagnosis Date     Anxiety      Bipolar disorder (H)      C. difficile colitis      Depressive disorder      Essential hypertension 7/8/2015     Gastro-oesophageal reflux disease      Hypothyroidism 4/1/2015     Migraine      Spontaneous pneumothorax     x3, resolved w/ pleurodesis      Suicide attempt                 Past Surgical History:     Past Surgical History:   Procedure Laterality Date     ARTHROSCOPY KNEE      L knee     CERVIX SURGERY      for pre-cancerous changes     left knee surgery       ORTHOPEDIC SURGERY      L shoulder     THORACIC SURGERY      for pneumothorax, pleurodesis and lobe resection               Home Medications:     Prior to Admission medications    Medication Sig Last Dose Taking? Auth Provider   mirtazapine (REMERON) 15 MG tablet Start at 15 mg qhs.  After 5 days, increase to 30 mg qhs.   Jaden Rodriguez MD   OLANZapine (ZYPREXA) 5 MG tablet Take 1 tablet (5 mg) by mouth 2 times daily May take extra dose prn paranoia.   Jaden Rodriguez MD   clonazePAM (KLONOPIN) 1 MG tablet Take 1 tablet (1 mg) by mouth 3 times daily as needed for anxiety   Jaden Rodriguez MD   hydrOXYzine (ATARAX) 50 MG tablet Take 1 tablet (50 mg) by mouth 3 times daily as needed for itching   Jaden Rodriguez MD   oxyCODONE-acetaminophen (PERCOCET) 5-325 MG per tablet Take 1-2 tablets by mouth every 6 hours as needed for moderate to severe pain   Heather Alexander MD   vancomycin (VANOCIN) 50 mg/mL LIQD solution Take by mouth as directed:  125mg QID x 10 days, then 125mg TID x 7 days, then 125mg BID x 7 days, then 125mg daily x 7 days.   Luis Segal PA-C   Eletriptan Hydrobromide (RELPAX PO) Take 20 mg by mouth at onset of headache for migraine (May repeat in 2 hours if needed)   Unknown, Entered By History   HYDROXYZINE HCL PO Take 100 mg  "by mouth daily as needed   Unknown, Entered By History   IBUPROFEN PO Take 400-600 mg by mouth every 6 hours as needed for moderate pain   Unknown, Entered By History   Levothyroxine Sodium (SYNTHROID PO) Take 50 mcg by mouth daily   Unknown, Entered By History   desogestrel-ethinyl estradiol (APRI) 0.15-30 MG-MCG per tablet Take 1 tablet by mouth daily   Unknown, Entered By History   triamcinolone (KENALOG) 0.1 % cream Apply topically 2 times daily as needed for irritation   Unknown, Entered By History   Ondansetron (ZOFRAN ODT PO) Take 4 mg by mouth every 8 hours as needed for nausea   Unknown, Entered By History   clindamycin (CLEOCIN T) 1 % lotion Apply topically every morning   Unknown, Entered By History   TIZANIDINE HCL PO Take 4 mg by mouth At Bedtime   Unknown, Entered By History   Pantoprazole Sodium (PROTONIX PO) Take 40 mg by mouth every morning (before breakfast)    Reported, Patient   Ketorolac Tromethamine (TORADOL IM) Inject 30 mg into the muscle daily as needed Per pt for migraines   Reported, Patient   Acetaminophen (TYLENOL 8 HOUR PO) Take 1,000 mg by mouth every 6 hours as needed (pain)    Reported, Patient            Allergies:     Allergies   Allergen Reactions     Amoxicillin-Pot Clavulanate      PN: LW Reaction: Itching, Pruritis     Azithromycin      Other reaction(s): Dermatitis     Cephalexin      PN: LW Reaction: Itching, Pruritis     Ciprofloxacin Other (See Comments)     Joint pain cdiff     Compazine [Prochlorperazine] Other (See Comments)     dystonia     Metoclopramide Other (See Comments)     \"I feel like I am crawling out of my skin\"     Minocycline Nausea and Vomiting     PN: DIARRHEA, VOMITING, AND STOMACH CRAMPS     Penicillins Itching     Reglan [Metoclopramide Hcl] Other (See Comments)     Sensation of \"crawling out of skin\"     Restoril [Temazepam]      Thorazine [Chlorpromazine] Other (See Comments)     dystonia     Abilify [Aripiprazole] Rash     Lamotrigine Rash     Severe " "drug rash - contraindication to receiving again. Skin peeling.      Sulfa Drugs Rash            Social History:     Social History     Social History     Marital status:      Spouse name: N/A     Number of children: N/A     Years of education: N/A     Occupational History     Not on file.     Social History Main Topics     Smoking status: Current Some Day Smoker     Types: Cigarettes     Last attempt to quit: 1/1/1997     Smokeless tobacco: Never Used     Alcohol use No     Drug use: No     Sexual activity: Yes     Partners: Male     Birth control/ protection: OCP     Other Topics Concern     Not on file     Social History Narrative                  Family History:     Family History   Problem Relation Age of Onset     Depression Mother      Lipids Father      hyperlipidemia     Macular Degeneration Father                    Review of Systems:   The 10 point Review of Systems is negative other than noted in the HPI.              Physical Exam:     Heart Rate: 99, Blood pressure 101/48, pulse 110, temperature 95.7  F (35.4  C), resp. rate 17, height 1.6 m (5' 3\"), weight 52.2 kg (115 lb), SpO2 99 %.  115 lbs 0 oz     General: intubated , sedated   HEENT: NC/AT, eyes anicteric,   face symmetric.  Dentition WNL, MM moist.  Cardiac: tachycardic, regular, S1, S2.  No murmurs appreciated.  Pulmonary: Normal chest rise, normal work of breathing.  Lungs CTA BL  Abdomen: soft,   non-distended.  Bowel Sounds Present.  No guarding.  Extremities: no deformities.  Warm, well perfused.  Skin: no rashes or lesions noted.  Warm and Dry.  Neuro: unable   Psych: unable to assess          Data:   All new lab and imaging data was reviewed.    Recent Labs  Lab 12/13/17  1148   WBC 15.6*   HGB 14.3   HCT 41.9   MCV 90          Recent Labs  Lab 12/13/17  1148      POTASSIUM 2.7*   CHLORIDE 105   CO2 20   ANIONGAP 15*   *   BUN 18   CR 1.22*   GFRESTIMATED 47*   GFRESTBLACK 57*   ISAURO 9.6       EKG:  Prolonged QTc "   Imaging:  Results for orders placed or performed during the hospital encounter of 12/13/17   XR Chest Port 1 View    Narrative    XR CHEST PORT 1 VW 12/13/2017 12:44 PM    HISTORY: Drug ingestion.     COMPARISON: 3/8/2017    FINDINGS: No airspace consolidation, pleural effusion or pneumothorax.  Normal heart size. Postsurgical change at the left lung apex. Enteric  tube extends the stomach. Right central line tip is in the projection  of the SVC.      Impression    IMPRESSION: No acute pulmonary abnormality.    CHARLEY ESCOTO MD   Head CT w/o contrast    Narrative    CT OF THE HEAD WITHOUT CONTRAST  12/13/2017 1:13 PM     COMPARISON: Head CT 3/9/2017.    HISTORY: Altered mental status.    TECHNIQUE: Axial CT images of the head from the skull base to the  vertex were acquired without IV contrast.    FINDINGS:  The ventricles and basal cisterns are within normal limits  in configuration. There is no midline shift. There are no extra-axial  fluid collections.  Gray-white differentiation is well maintained.     No intracranial hemorrhage, mass or recent infarct.    The visualized paranasal sinuses are well aerated. There is no  mastoiditis. There are no fractures of the visualized bones.       Impression    IMPRESSION:  Normal head CT.     Radiation dose for this scan was reduced using automated exposure  control, adjustment of the mA and/or kV according to patient size, or  iterative reconstruction technique.    MD Laura ROSE MD  Hospitalist  [facility] [address] [place] [address] [phone]

## 2017-12-13 NOTE — IP AVS SNAPSHOT
` `     UNIT 5A [facility]: [phone]                 INTERAGENCY TRANSFER FORM - NOTES (H&P, Discharge Summary, Consults, Procedures, Therapies)   2017                    Hospital Admission Date: 2017  ANA LAURA TINEO   : 1970  Sex: Female        Patient PCP Information     Provider PCP Type    Ander Fuchs MD General         History & Physicals      H&P by Macey Avina MD at 2017 10:31 PM     Author:  Macey Avina MD Service:  Medical ICU Author Type:  Resident    Filed:  2017  6:58 AM Date of Service:  2017 10:31 PM Creation Time:  2017 10:31 PM    Status:  Attested Addendum :  Macey Avina MD (Resident)    Cosigner:  Nomi Serna MD at 2017  7:22 PM        Attestation signed by Nomi Serna MD at 2017  7:22 PM        Attestation:  Physician Attestation   See previous note                               [facility]      MICU History and Physicial  Ana Laura Tineo MRN: [id number]  1970  Date of Admission:(Not on file)  Primary care provider: Ander Fuchs      Assessment and Plan:     Ana Laura is a 47-year-old female with history of bipolar disorder with depression, polysubstance abuse, and prior history of suicide attempts who was initially admitted to [facility] on  with altered mentation after intentional overdose with unknown quantity of amlodipine, NyQuil, Tylenol and vodka.  She was subsequently transferred to [facility] for further management of aforementioned overdose on mechanical ventilator, high-dose insulin infusion and N-acetylcysteine.    PLAN:  ===NEURO===  # Bipolar[KN1.1] I[KN1.2] disorder[KN1.1] with anxiety[KN1.2]:   # Suicide attempt with polysubstance overdose[KN1.1] (amlodipine, nyquil, vodka)[KN1.3]:[KN1.1]  Pt has 4+ prior attempts of overdose and over 20 psych admissions, most recently hospitalized with psych this past March after being off of all  "of her psych medications.[KN1.2] Patient reportedly took nyquil, amlodipine and vodka (EtOH 0.17 at OSH).[KN1.3] Patient was initiated on an[KN1.1] NAC[KN1.4] for elevated tylenol level (no e/o severe hypatotoxicity with AST & ALT <1000)[KN1.3] and high-dose insulin at [facility].  Poison control was contacted there with recommendations to continue aggressive cares.[KN1.1]  -[KN1.4] Continue[KN1.1] NAC[KN1.4] protocol for Tylenol overdose[KN1.1]  -[KN1.4] Continue to monitor serial LFTs[KN1.1]   - Monitor for s/sx of withdrawal (EtOH 0.17 at OSH)  - Poison control aware   - Psych consult[KN1.4] when extubated[KN1.5]  - Continue home zyprexa & remeron   - Prior psych medications include: \"Seroquel, Zyprexa, Abilify, Risperdal, Geodon, Depakote, lithium, Lamictal, Topamax. Prozac, Paxil, Zoloft, Celexa, Effexor, Cymbalta, Remeron, amitriptyline and nortriptyline. Xanax, Ativan, Klonopin. She has been on Vistaril and Inderal. She has been on Ambien, Lunesta, trazodone, temazepam, Rozerem.\"   - She has completed CD treatment years ago for EtOH   [KN1.2]  # Sedation[KN1.1]/Analgesia  - Versed gtt, fentanyl  PRN[KN1.4]    ===CARDIOVASCULAR===  #[KN1.1] Bradycardia w/ mixed cardiogenic & vasodilatory shock:[KN1.4]  Upon transer[KN1.6] patient bradycardic with heart rate in the 50s-60s with associated hypotension requiring moderate doses of[KN1.4] levophed to[KN1.6] maintain maps in the 60s.  Patient also maintained on insulin drip at[KN1.4] 5 units/kg/hr.[KN1.6]  Lactate peaked at 8.5 at the outside hospital.  Initial check lactate relatively stable at 6.0, last check of 5.4 prior to transfer.  - Continue insulin with D 20 infusion  - Levophed drip  - MAP goal 60-65   - Strict I/Os, monitor UOP[KN1.4]    ===PULMONARY===[KN1.1]  Ventilation Mode: CMV/AC  FiO2 (%): 30 %  Rate Set (breaths/minute): 16 breaths/min  Tidal Volume Set (mL): 500 mL  PEEP (cm H2O): 5 cmH2O  Oxygen Concentration (%): 50 %  Resp: " 15[KN1.7]    #[KN1.1] Mechanical ventilation:  Patient intubated at outside hospital given altered mentation for airway protection.[KN1.4]  - Daily PST  - Wean FiO2 as above  - Anticipate extubation in the next 24 hours pending patient's mentation[KN1.5]    ===GASTROINTESTINAL===[KN1.1]  MELD-Na score: 13 at 12/13/2017 11:56 PM  MELD score: 13 at 12/13/2017 11:56 PM  Calculated from:  Serum Creatinine: 1.13 mg/dL at 12/13/2017 11:56 PM  Serum Sodium: 142 mmol/L (Rounded to 137) at 12/13/2017 11:56 PM  Total Bilirubin: 0.6 mg/dL (Rounded to 1) at 12/13/2017 11:56 PM  INR(ratio): 1.56 at 12/13/2017 11:56 PM  Age: 47 years[KN1.8]    # Tylenol OD:  # Transaminitis:  No e/o severe haptotoxicity at this juncture, ALT/AST 100s. Continuing NAC protocol.  - NAC protocol  - Trend LFTs, INR, acetaminophen[KN1.9]      # Nutrition:   -[KN1.1] NPO for now  - OG to LIS[KN1.5]    ===RENAL===  UOP:[KN1.1]     Intake/Output Summary (Last 24 hours) at 12/14/17 0144  Last data filed at 12/14/17 0000   Gross per 24 hour   Intake            16.97 ml   Output               75 ml   Net           -58.03 ml[KN1.10]     #[KN1.1] EDUARDO:  Pre-renal secondary to overdose and subsequent shock.  - 5L IVF given above  - Maintain MAPs >60  - Strict I/os  - Avoid nephrotoxic agents including contrast & NSAIDs  - AM BMP    # AGMA with respiratory alkalosis:  ABG upon arrival 7.29/26/139/13 secondary to lactic acidosis from shock. Total body volume up after 5L IVF at OSH.  - q4h lactate  - Maintain MAPs>60, will increase goal if lactate does not improve[KN1.5]    # Electrolyte derangements:  Low potassium 2/2 shifting, elevated calcium 2/2 CCB overdose & management with IV calcium and related low phos.  - Electrolyte replacement protocols in place  - Check potassium every 2 hours, target low-normal range, ~ 3.4[KN1.3]    # Fluids:[KN1.1] Patient given 5L IVF at OSH[KN1.5]    ===HEME/ONC===  #[KN1.1] Leukocytosis:  Likely stress 2/2 overdose.  - AM[KN1.5]  CBC    # Anemia:  Dilutional from 5L IVF at outside hospital. No s/sx of bleeding.  - AM CBC[KN1.3]    ===ENDOCRINE===  #[KN1.1] Hypothyroidism:  - Continue home levothyroxine[KN1.5]    ===INFECTIOUS DISEASE===[KN1.1]  No acute issues.[KN1.5]    # H/o RCDI: Had prior fecal transplant. No active diarrhea.[KN1.3]    # Antimicrobials:[KN1.1] None currently.[KN1.5]    ===SKIN/MSK===[KN1.1]  No acute issues.[KN1.5]    Prophylaxis:  DVT:[KN1.1] Mechanical[KN1.4]   GI:[KN1.1] PPI[KN1.4]  Family:[KN1.1]  Unable to contact overnight (no emergency contact found).[KN1.5]  Disposition: Critically Ill[KN1.1], transferred from[KN1.4] [facility][KN1.6] ICU.[KN1.4]  Code Status:[KN1.1] Full[KN1.4]    Patient was seen and discussed with attending physician [KN1.1] Daphne[KN1.4], who agrees with above assessment and plan.    Macey Avina MD  Internal Medicine, PGY-3  Pager: [phone]         Chief Complaint:[KN1.1]   Overdose[KN1.4]         History of Present Illness:[KN1.1]   Brief HPI obtained from review of outside hospital records.    Ana Laura is a 47-year-old female with a prior medical history significant for bipolar disorder with depression, prior suicide attempts, hypertension, hyperlipidemia [facility] of a drug overdose.  The patient reportedly was on the phone with a friend the morning of 12/13/17 and at which time she became unresponsive on the phone.  Her friend subsequently called the police department to evaluate.  The patient was reportedly found unresponsive in her kitchen with 2 empty bottles of amlodipine a quarter bottle of NyQuil and empty 0.75 bottle of vodka.  The patient was noted to have blood pressure of 77/50 and blood sugar of 141 prior to transfer by EMS.  Friend reported that patient has been undergoing divorce with her  and noted recently that she did not want to live.[KN1.4]         Review of Systems:     Unable to  "perform due to patient condition.          Past Medical History:   Medical History reviewed.[KN1.1]   Past Medical History:   Diagnosis Date     Anxiety      Bipolar disorder (H)      C. difficile colitis      Depressive disorder      Essential hypertension 7/8/2015     Gastro-oesophageal reflux disease      Hypothyroidism 4/1/2015     Migraine      Spontaneous pneumothorax     x3, resolved w/ pleurodesis      Suicide attempt[KN1.11]              Past Surgical History:   Surgical History reviewed.[KN1.1]   Past Surgical History:   Procedure Laterality Date     ARTHROSCOPY KNEE      L knee     CERVIX SURGERY      for pre-cancerous changes     left knee surgery       ORTHOPEDIC SURGERY      L shoulder     THORACIC SURGERY      for pneumothorax, pleurodesis and lobe resection[KN1.11]             Social History:   Social History reviewed.[KN1.1]  Social History   Substance Use Topics     Smoking status: Current Some Day Smoker     Types: Cigarettes     Last attempt to quit: 1/1/1997     Smokeless tobacco: Never Used     Alcohol use No[KN1.11]             Family History:   Family History reviewed.[KN1.1]   Family History   Problem Relation Age of Onset     Depression Mother      Lipids Father      hyperlipidemia     Macular Degeneration Father[KN1.11]              Allergies:[KN1.1]     Allergies   Allergen Reactions     Amoxicillin-Pot Clavulanate      PN: LW Reaction: Itching, Pruritis     Azithromycin      Other reaction(s): Dermatitis     Cephalexin      PN: LW Reaction: Itching, Pruritis     Ciprofloxacin Other (See Comments)     Joint pain cdiff     Compazine [Prochlorperazine] Other (See Comments)     dystonia     Metoclopramide Other (See Comments)     \"I feel like I am crawling out of my skin\"     Minocycline Nausea and Vomiting     PN: DIARRHEA, VOMITING, AND STOMACH CRAMPS     Penicillins Itching     Reglan [Metoclopramide Hcl] Other (See Comments)     Sensation of \"crawling out of skin\"     Restoril " [Temazepam]      Thorazine [Chlorpromazine] Other (See Comments)     dystonia     Abilify [Aripiprazole] Rash     Lamotrigine Rash     Severe drug rash - contraindication to receiving again. Skin peeling.      Sulfa Drugs Rash[KN1.11]             Medications:   Medications Reviewed.[KN1.1]   Current Facility-Administered Medications   Medication     NaCl 0.9 % infusion     naloxone (NARCAN) injection 0.1-0.4 mg     norepinephrine (LEVOPHED) 16 mg in D5W 250 mL infusion     senna-docusate (SENOKOT-S;PERICOLACE) 8.6-50 MG per tablet 1 tablet    Or     senna-docusate (SENOKOT-S;PERICOLACE) 8.6-50 MG per tablet 2 tablet     bisacodyl (DULCOLAX) Suppository 10 mg     acetylcysteine (ACETADOTE) 6,000 mg in NaCl 0.9 % 500 mL STEP THREE infusion     dextrose 50 % injection 25 g     insulin regular (NovoLIN R) 10 units/mL in NS INFUSION     dextrose 50 % injection 25 g     potassium chloride SA (K-DUR/KLOR-CON M) CR tablet 20-40 mEq     potassium chloride (KLOR-CON) Packet 20-40 mEq     potassium chloride 20 mEq in 50 mL intermittent infusion     magnesium sulfate 1 gm in 100mL D5W intermittent infusion     magnesium sulfate 2 g in NS intermittent infusion (PharMEDium or FV Cmpd)     magnesium sulfate 3 g in NS intermittent infusion     magnesium sulfate 4 g in 100 mL sterile water (premade)     dextrose 20% infusion     calcium gluconate 3 g in D5W 100 mL intermittent infusion     glucose 40 % gel 15-30 g    Or     dextrose 50 % injection 25-50 mL    Or     glucagon injection 1 mg     EPINEPHrine (ADRENALIN) 5 mg in NaCl 0.9 % 250 mL infusion     fentaNYL (SUBLIMAZE) infusion     midazolam (VERSED) 100 mg in sodium chloride 0.9% 100 mL infusion     potassium chloride 20 mEq in 50 mL intermittent infusion[KN1.11]             Physical Exam:   Vitals were reviewed.[KN1.1]  Blood pressure 106/55, temperature 98.5  F (36.9  C), temperature source Axillary, resp. rate 15, SpO2 100 %.    Intake/Output Summary (Last 24 hours) at  12/14/17 0034  Last data filed at 12/14/17 0000   Gross per 24 hour   Intake            16.97 ml   Output               75 ml   Net           -58.03 ml[KN1.11]     CONSTITUTIONAL: Ms. Wharton is lying down in bed intubated and sedated exam.  HEENT:  NC/AT.  OP Clear.  MMM. PERRL. ETT in place.  LUNGS: CTAB w/ no wheezes or crackles appreciated in anterior lung fields.  CARDIOVASCULAR: Bradycardic, RR w/ no M/R/G.   ABDOMEN: Soft, ND, NT, BS +ve.   MUSCULOSKELETAL:  Moves all four extremities when sedation weaned.  DP pulses intact.  No lower extremity edema.   NEURO: Intubated and sedate.  SKIN: Warm, Dry, No rashes.     LDA: Right brachial arterial line, right subclavian CVC, PIV x2, Napoles[KN1.4]         Data:      ROUTINE LABS (Last four results)  CMP[KN1.1]    Recent Labs  Lab 12/13/17  2035 12/13/17  1836 12/13/17  1148   *  --  140   POTASSIUM 2.9*  --  2.7*   CHLORIDE 115*  --  105   CO2 15*  --  20   ANIONGAP 16*  --  15*   GLC 77  --  107*   BUN 16  --  18   CR 1.09*  --  1.22*   GFRESTIMATED 54*  --  47*   GFRESTBLACK 65  --  57*   ISAURO 11.2*  --  9.6   PROTTOTAL  --  5.2* 7.6   ALBUMIN  --  2.5* 4.0   BILITOTAL  --  0.6 1.0   ALKPHOS  --  172* 281*   AST  --  128* 273*   ALT  --  106* 152*[KN1.11]     CBC[KN1.1]    Recent Labs  Lab 12/13/17  1148   WBC 15.6*   RBC 4.64   HGB 14.3   HCT 41.9   MCV 90   MCH 30.8   MCHC 34.1   RDW 17.1*   [KN1.11]     INR[KN1.1]    Recent Labs  Lab 12/13/17  1148   INR 1.05[KN1.11]     Arterial Blood Gas[KN1.1]    Recent Labs  Lab 12/13/17  2358 12/13/17  1445   PH 7.29* 7.22*   PCO2 26* 41   PO2 139* 193*   HCO3 13* 17*   O2PER 40 Ventilator[KN1.11]     Imaging:    CTH (12/13/17):  Normal head CT.    CXR (12/13/17):  No acute pulmonary abnormality[KN1.4]     Revision History        User Key Date/Time User Provider Type Action    > KN1.6 12/14/2017  6:58 AM Macey Avina MD Resident Addend     KN1.2 12/14/2017  2:55 AM Macey Avina MD Resident Sign      KN1.8 12/14/2017  2:33 AM Macey Avina MD Resident Sign     KN1.9 12/14/2017  2:23 AM Macey Avina MD Resident      KN1.3 12/14/2017  2:19 AM Macey Avina MD Resident Sign     KN1.10 12/14/2017  1:50 AM Macey Avina MD Resident Sign     KN1.5 12/14/2017  1:35 AM Macey Avina MD Resident      KN1.7 12/14/2017 12:55 AM Macey Avina MD Resident      KN1.11 12/14/2017 12:34 AM Macey Avina MD Resident      KN1.4 12/14/2017 12:31 AM Macey Avina MD Resident      KN1.1 12/13/2017 10:31 PM Macey Avina MD Resident                      Discharge Summaries      Discharge Summaries by Negin Mahtias PA-C at 1/5/2018  9:55 AM     Author:  Negin Mathias PA-C Service:  Internal Medicine Author Type:  Physician Assistant    Filed:  1/5/2018  9:57 AM Date of Service:  1/5/2018  9:55 AM Creation Time:  1/4/2018  3:12 PM    Status:  Addendum :  Negin Mathias PA-C (Physician Assistant)    Cosigner:  Kerry Lopez MD at 1/5/2018  3:08 PM             [facility]    Internal Medicine Discharge Summary- Gold Service    Date of Admission:  12/13/2017  Date of Discharge:[CM1.1]  1/[CM1.2]5[CM1.3]/2018[CM1.2]  Discharging Provider: Negin Mathias PA-C, Dr Lopez  Discharge Team: Gold 3    Discharge Diagnoses[CM1.1]   Intentional overdose 12/13 with bipolar disorder, depression, anxiety, alcohol abuse, possible benzodiazepine withdrawal  Diarrhea, recurrent c.diff  Acute pancreatitis, transaminitis  ARDS 2/2 aspiration, acute hypoxic/hypercarbic respiratory failure  Severe malnutrition  HTN  Pressure injury  Deconditioning[CM1.4]    Follow-ups Needed After Discharge   - Have routine CMP and CBC checked every Monday while at [facility].  - Needs repeat TSH, T4 on 1/8/17 with dose adjustments to be made to synthroid if needed  - Needs to continue to follow with psychiatry as needed  while at [facility]. 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 ICU course c/b lactic acidosis, acute hypoxic/hypercarbic respiratory failure 2/2 aspiration requiring intubation c/b ARDS, ARF, severe hypotension/vasogenic shock requiring pressors, paroxysmal A.fib, intermittent SVT, and sepsis 2/2 sinusitis. Patient extubated 12/24/17, transferred to medicine 12/25 for ongoing care. She did well once transferred to medicine floor. Her suicidal ideation resolved and she did well once bedside attendant removed. Her c.diff improved and was well controlled on vancomycin. Her main ongoing issue remained deconditioning. She was discharged to [facility] on 1/4/18.    The following problems were addressed during her hospitalization:[CM1.1]    Intentional overdose 12/13 with bipolar disorder, depression, anxiety, etoh abuse, possible benzodiazapine w/d: Overdose with amlodipine, Tylenol, Nyquil and Etoh. PTA drinking 16 oz vodka daily for 1.5 days. Treated with Ca gtt, high dose insulin, and NAC per poison control. Extubated 12/24. Sitter discontinued 12/28 per psych recs.[CM1.4] Psych has been following at least weekly, last seen 1/2, recommend med psych unit (patient not a candidate) vs rehab. [facility] accepted patient on 1/4/18. Once completes rehab, will need intensive day treatment program. Cont remeron 15mg qhs  and olazapine 5mg BID. Cont prn ativan 1-2mg q8h prn starting 1/5. Cont thiamine and folic acid. [facility], would benefit from weekly visits if needed.[CM1.5]       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 C.diff +12/22. AXR 12/28 no acute findings. Currently on po vancomycin with slow improvement in stooling, unfortunately had to start abx for UTI as below. Has had decreasing stools while on vancomycin,[CM1.4] only 1-2 loose stools daily. GI followed and recommended starting long vancomycin as follows:[CM1.5] 125mg qid x 1[CM1.1]3[CM1.3]days, 125mg TID x 7d, 125mg BID x 7d, 125mg QD x 7d, 125mg QOD x 7d[CM1.1]. If patient needs recurrent antibiotics, need to discuss vanco recs with primary GI MD Dr Dennis.[CM1.5]       Acute pancreatitis, transaminitis. Lipase 1775 12/26. Previously normal. US no stones. MRCP normal 12/27. ALP improving[CM1.4] 429[CM1.5] (648), Tbili stable ~1.5. ALT, AST stable. Previously had diffuse abdominal tenderness, this resolved on 12/29. GI following, transaminitis thought to be 2/2 cholestasis of sepsis and critical illness. Tolerating diet as of 12/29.[CM1.4] Cont regular diet. [facility]CM1.5[facility]       ARDS 2/2 aspiration, acute hypoxic/hypercarbic respiratory failure. Initially d/t overdose, V/Q mismatch in setting of ARDS. S/p intubation (initially prone) flolan, solumedrol, diuretics, and Duobneb. Extubated 12/24. Completed steroids 12/28. VSS. Has no required supplemental oxygen since 12/29. Repeat CXR 1/2/18 with stable diffuse pulmonary opacities R>L. Ongoing dry cough, which will likely persist for weeks. No new fevers. O2 sats stable on RA. Cough overall improving to near resolved. Continue aggressive pulm toilet with acapella and IS on discharge. Cont tessalon pearls for cough.     Severe malnutrition. Prealbumin 25 12/28. Very limited PO intake, patient reports due to high  volume BM. Would like to avoid NJ at all lengths. PO intake improved after long discussion about TFs on 12/29. Nutrition following and calorie counts improved. Cont regular diet. Cont to monitor po intake and [facility]       HTN. PTA on Norvasc. Currently on coreg 6.25mg BID and clonidine 0.1mg TID. BPs stable on current regimen. Cont this regimen on discharge.       Pressure injury. Not present on admission. Bilateral cheek and left forearm. WOCN following, last saw 1/2 with recs to wash BID with saline and gauze and gently rub vaseline on cheek wounds, and was left forearmwith recs to cleanse with saline solution. Continue off loading measures. Continue these wound cares on discharge.       Deconditioning. In setting of prolonged hospitalization and recent critical illness. Working with PT/OT. PT/OT currently recommending TCU. Unfortunately given patients recent severity of illness, has been difficult to place. Discharged to [facility] 1/4.       Resolved Hospital Problems and Stable, Chronic Medical Conditions:  Altered mental status. Resolved. Was having waxing and waning mental clarity, which was likely multifactorial including: rapid cycling bipolar with psychotic features, ?drug induced psychosis, ?ICU delirium, ?ongoing infection with c.diff. Has been A&O x 3 over past 72 hours without hallucinations, agitation, or inappropriate comments. Monitor closely.       Likely acute opiate withdrawal. Signs and symptoms of opiate withdrawal noted 12/27, likely 2/2 high fentanyl dosing during intubation (chills, myalgia, diarrhea, diaphoresis, abdominal pain). Although several symptoms can be connected to other etiology (ex. Diarrhea and c diff, abdominal pain and pancreatitis). Started on scheduled opiates 12/27 with improvement, tapered off completely 12/31. No further s/sx of withdrawal. Cont to monitor for s/sx of withdrawal. Did not require any narcotics prior to discharge.       Dysuria. Patient reports dysuria, increased urinary frequency overnight 12/30. UA with WBC, +LE, neg nitrate. UCx prelim shows candida, also spoke with lab and a gram positive cocci species is growing - they then retracted this and resulted the culture as mixed urogenital mina. S/p macrobid 12/30- 1/2. Stopped macrobid once cx resulted as mixed urogenital mina. Dysuria was likely 2/2 vaginal candidiasis.      Vaginal candidiasis. Wet prep + candida. Neg for trich or BV. S/p diflucan 150mg x 1 on 1/1 and again on 1/4. No recurrence of symptoms. Monitor for recurrence of symptoms on discharge.       Hypernatremia. Elevated from admission to 12/25, normalized 12/26. Thought 2/2 hypovolemia.      ARF. 2/2 CCB overdose. Cr peak 1.62. C/b volume overload and pulmonary edema, treated with IV lasix. Cr since normalized.       SVT. Presented with HR 190s in setting of critical illness following OD, resolved with IVF.       Paroxysmal a fib. Brief episode of a fib 12/20 with conversion to NSR with metoprolol x2. No further episodes. On coreg as above.      Normocytic anemia. Hgb 10.4 on admit. Drop to 5.8 12/19, s/p 1U PRBC. Smear with marked normochromic, normocytic anemia, very rare RBC fragments and spherocytes. Haptoglobin 63, . Concern for possible alveolar hemorrhage as above. Possibly 2/2 chronic Etoh use. Hgb now stable. Cont folic acid.       Coagulopathy. In the setting of etoh use, poor nutrition. S/p vitamin K. No s/s of acute bleeding. Normalized 12/27.      Hypothyroidism. TSH 5.06, T4 normal in setting of critical illness thus no dose adjustments made. Continue PTA synthroid. Needs repeat TFTS on 1/8/17      Incidental MRCP findings. Abnormal soft tissue posterior to the fundus of the stomach, similar to 1/2017 CT. Needs follow up as outpatient for biopsy on discharge - PCP vs GI to arrange on discharge from [facility].       GI ppx. In setting of recent OD. Cont PPI qd.      Lactic acidosis. Pt had  lactic acid elev to 2.3 on 1/2 after triggering protocol for tachycardia. BC x 2 obtained, CXR obtained -stable with previous. Procalc low. Has known c.diff and resolving ARDS as above. Also patient with poor fluid intake. Repeat lactic acid today showed 0.9 after no changes were made to plan. Only check if triggers protocol.[CM1.4]     Consultations This Hospital Stay[CM1.1]   SPEECH LANGUAGE PATH ADULT IP CONSULT  CHEMICAL DEPENDENCY IP CONSULT  PSYCHIATRY IP CONSULT  VASCULAR ACCESS CARE ADULT IP CONSULT  PHARMACY TO DOSE VANCO  CARDIOLOGY GENERAL ADULT IP CONSULT  VASCULAR ACCESS CARE ADULT IP CONSULT  VASCULAR ACCESS CARE ADULT IP CONSULT  PHARMACY TO MAXIMALLY CONCENTRATE IV MEDICATIONS  NEPHROLOGY GENERAL ADULT IP CONSULT  [facility]  VASCULAR ACCESS CARE ADULT IP CONSULT  VASCULAR ACCESS CARE ADULT IP CONSULT  VASCULAR ACCESS CARE ADULT IP CONSULT  [facility]Y [facility]  VASCULAR ACCESS ADULT IP CONSULT  NUTRITION SERVICES ADULT IP CONSULT  PHARMACY IP CONSULT  PHARMACY [facility]  WOUND OSTOMY CONTINENCE NURSE  IP CONSULT  PSYCHIATRY IP CONSULT  SWALLOW EVAL SPEECH PATH AT BEDSIDE IP CONSULT  PHYSICAL THERAPY ADULT IP CONSULT  OCCUPATIONAL THERAPY ADULT IP CONSULT  PSYCHIATRY IP CONSULT  SOCIAL WORK IP CONSULT  GI PANCREATICOBILIARY ADULT IP CONSULT  PSYCHIATRY IP CONSULT  PHYSICAL MEDICINE & REHAB ASSESSMENT FOR REHAB PLACEMENT ADULT IP CONSULT  PHYSICAL THERAPY ADULT IP CONSULT  OCCUPATIONAL THERAPY ADULT IP CONSULT  PSYCHIATRY IP CONSULT[CM1.6]     Code Status[CM1.1]   Full Code[CM1.4]    Time Spent on this Encounter[CM1.1]   I, Negin Mathias, personally saw the patient today and spent greater than 30 minutes discharging this patient.[CM1.4]       Negin Mathias  Internal Medicine Staff Hospitalist Service  [facility]  Pager:[CM1.1] [phone][CM1.4]  ______________________________________________________________________    Physical Exam   Vital Signs:  Temp: 98.7  F (37.1  C) Temp src: Oral BP: 117/71   Heart Rate: 94 Resp: 16 SpO2: 99 % O2 Device: None (Room air)    Weight: 104 lbs 4.8 oz    General Appearance:[CM1.1] WDWN,[CM1.4] pleasant and cooperative, resting comfortably in bed[CM1.3]  Respiratory:[CM1.1] Effort normal on RA.[CM1.4] Lunfs CTAB throughout.[CM1.3]   Cardiovascular[CM1.1]: RRR, no m/r/g.[CM1.3]  GI:[CM1.1] abdomen soft, nt/nd. +BS[CM1.3]  Skin:[CM1.1] ecchymosis on L cheek - no skin breakdown  Extremities: no peripheral edema[CM1.3]    Significant Results and Procedures[CM1.1]   Results for orders placed or performed during the hospital encounter of 12/13/17   XR Chest Port 1 View    Narrative    XR CHEST PORT 1 VW 12/13/2017 11:13 PM    HISTORY: Endotracheal tube positioning;     COMPARISON: 12/13/2017    FINDINGS:   Endotracheal tube projects 2 cm above the coleman. Enteric tube  sidehole projects over the stomach. Right IJ central venous catheter  with tip in the low SVC.    Cardiac silhouette and pulmonary vasculature are within normal limits.  Left apical pleural thickening status post pleurodesis no pneumothorax  or pleural effusion. Mild left retrocardiac opacities, likely  representing atelectasis.      Impression    IMPRESSION: Endotracheal tube projects 2 cm above the coleman.    I have personally reviewed the examination and initial interpretation  and I agree with the findings.    HUDSON WHITTAKER MD   XR Abdomen Port 1 View    Narrative    XR ABDOMEN PORT F1 VW 12/14/2017 6:27 AM    History: abdominal pain;     Comparison: CT abdomen pelvis 1/26/2017    Findings: Air-filled bowel in the mid abdomen. No pneumatosis or  portal venous gas. Right basilar opacities. Right base atelectasis and  possible small effusion.      Impression    Impression: Nonobstructive bowel gas pattern with nondilated loops of  bowel in the mid abdomen.     I have personally reviewed the examination and initial interpretation  and I agree with the  findings.    ESTEE ARCINIEGA MD   XR Chest Port 1 View     Value    Radiologist flags Right mainstem bronchus (Urgent)    Narrative    EXAM: XR CHEST PORT 1 VW  12/14/2017 9:10 AM      HISTORY: Intubated    COMPARISON: Radiograph 12/13/2017    FINDINGS: AP radiograph of the chest. Endotracheal tube tip projects  over the right mainstem bronchus. Right IJ central venous catheter tip  projects over the low SVC. Cardiac silhouette is unremarkable. Small  pleural effusions, new from prior. New perihilar opacities. No  pneumothorax.       Impression    IMPRESSION:   1. Endotracheal tube tip projects over the right mainstem bronchus.  2. New perihilar opacities, likely pulmonary edema. Additional  considerations include atypical infection or atelectasis.  3. New small pleural effusions.    [Urgent Result: Right mainstem bronchus]    Finding was identified on 12/14/2017 9:23 AM.     [facility] resident was contacted by Dr. Juan Juares at 12/14/2017 9:26 AM  and verbalized understanding of the urgent finding.     I have personally reviewed the examination and initial interpretation  and I agree with the findings.    DAVID AVNCE MD   XR Abdomen Port 1 View    Narrative    Examination:  XR ABDOMEN PORT F1 VW 12/14/2017 10:11 AM     Comparison: 12/14/2017    History: Og placement;     Findings: There is a single supine view of the abdomen . There is  gaseous distention of the stomach with a gastric tube tip projecting  over the fundus, sidehole likely beyond the gastroesophageal junction.  The small intestine and colon are not distended with scattered gas in  an overall nonobstructive pattern.  No evidence of pneumatosis. No  portal venous gas.    Dense retrocardiac/left basilar opacity with findings of volume loss,  concerning for atelectasis/collapse given prior right bronchial  intubation.      Impression    Impression:   1. Gaseous distention of the stomach. Gastric tube tip projecting over  the stomach with sidehole just  distal to the GE junction.  2. Partially visualized dense retrocardiac/left basilar opacity with  findings of volume loss, concerning for atelectasis/lobar collapse  especially given right bronchial intubation earlier the same date.    I have personally reviewed the examination and initial interpretation  and I agree with the findings.    ZOE PINON MD   XR Chest Port 1 View    Narrative    EXAM: XR CHEST PORT 1 VW  12/14/2017 10:14 AM      HISTORY: Endotracheal tube retracted    COMPARISON: Radiograph 12/14/2017    FINDINGS: AP radiograph of the chest. Endotracheal tube projects 5 cm  above the coleman. Gastric tube side port projects over the stomach.  Right IJ [place] catheter tip projects over the low SVC. Stable  perihilar opacities. Stable small pleural effusions with associated  bibasilar atelectasis/consolidation.      Impression    IMPRESSION:   1. Endotracheal tube projects over the midthoracic trachea. Gastric  tube side port projects over the stomach.  2. Stable perihilar opacities, likely pulmonary edema. Again, atypical  infection or atelectasis cannot be excluded.  3. Stable small pleural effusions with associated bibasilar  atelectasis/consolidation.    I have personally reviewed the examination and initial interpretation  and I agree with the findings.    DAVID VANCE MD   XR Chest Port 1 View    Narrative    XR CHEST PORT 1 VW, 12/14/2017 7:14 PM.    Comparison: 12/14/2017.    History: Intubated, pulmonary edema; .    Findings:   Endotracheal tube projects over the high thoracic trachea. Gastric  tube courses toward stomach, tip not visualized, sidehole projecting  likely just beyond the gastroesophageal junction. Right IJ  catheter  tip projects over the low SVC. Increased diffuse patchy airspace  opacities with a perihilar predominance. Slightly decreased left  retrocardiac opacity. Small bilateral pleural and effusions bibasilar  opacities. No pneumothorax.       Impression     IMPRESSION:   1. Stable support devices.  2. Increased diffuse patchy airspace opacities with a perihilar  predominance, which may represent pulmonary edema or atypical  infection.  3. Decreased pleural effusions with associated bibasilar  atelectasis/consolidation. Slightly decreased dense left retrocardiac  opacity.    I have personally reviewed the examination and initial interpretation  and I agree with the findings.    ZOE PINON MD   XR Chest Port 1 View    Narrative    Exam:  Chest X-ray 12/15/2017 11:29 AM    History: Intubated, pulmonary edema;     Comparison: Chest x-ray dated 12/14/2017    Findings: AP portable chest x-ray at 45 degrees. The endotracheal tube  tip is approximately 5.3 cm above the coleman. Right IJ central venous  catheter with the tip projecting over the low SVC. Partially  visualized enteric tube with the sidehole projecting over the stomach.  The cardiac silhouette is stable. Redemonstration of diffuse patchy  airspace opacities. Surgical changes of left upper lobe wedge  resection. No pneumothorax. The upper abdomen is unremarkable.      Impression    Impression: Stable to slightly increase diffuse airspace opacities,  atypical infection versus pulmonary edema.    I have personally reviewed the examination and initial interpretation  and I agree with the findings.    DAVID VANCE MD   XR Chest Port 1 View    Narrative    Exam:  Chest X-ray 12/16/2017 6:25 AM    History: Intubated, pulmonary edema;     Comparison: Chest x-ray dated 12/15/2017    Findings: AP portable chest x-ray. The endotracheal tube tip in the  mid intrathoracic trachea. Right IJ central venous catheter with the  tip projecting over the low SVC. Partially visualized enteric tube  with the sidehole projecting over the stomach. The cardiac silhouette  is stable. Redemonstration of diffuse patchy airspace opacities.  Surgical changes of left upper lobe wedge resection. No pneumothorax.  The upper abdomen is  unremarkable.      Impression    Impression: No significant change in diffuse airspace opacities,  atypical infection versus pulmonary edema.    I have personally reviewed the examination and initial interpretation  and I agree with the findings.    DAVID VANCE MD   CT Abdomen Pelvis w Contrast    Narrative    EXAMINATION: CT ABDOMEN PELVIS W CONTRAST, 12/16/2017 11:53 AM    TECHNIQUE:  Helical CT images from the lung bases through the  symphysis pubis were obtained  with IV contrast. Contrast dose: 45cc  isovue 370    COMPARISON: CT abdomen pelvis with contrast 1/26/2017    HISTORY: Ileus in the setting of calcium channel blockade, rising  lactate; calcium channel blocker overdose.    FINDINGS:  Lung bases: Right greater than left moderate pleural effusions with  associated dependent atelectasis. Associated basilar geographic areas  of groundglass and mild interlobular septal thickening. Mildly nodular  opacities within the left lung base. No significant pericardial  effusion.    Abdomen/pelvis: Gastric tube within the gastric lumen. Evaluation  limited by poor IV contrast bolus. Mild perihepatic ascites. Ascites  extends along the bilateral paracolic gutters and into the pelvis.  Stranding and thickening of the presacral region with diffuse  mesenteric and soft tissue edema. No evidence of gastric wall  thickening. Spleen is unremarkable. Adrenal glands are within normal  limits. Appearance of the kidneys are unremarkable. No hydronephrosis  or hydroureter. Bladder is partially decompressed by a [place] catheter.  Mildly prominent bladder wall likely secondary to decompressed state.  There is nondependent gas within the bladder, likely iatrogenic.    Diffuse hyperdense material throughout the small bowel and colon  consistent with ingested materials. No dilated loops of bowel. No  findings suggestive of pneumatosis. No portal venous gas or late stage  evidence of ischemia. Uterus is unremarkable. Stable density  adjacent  to the introitus (series 2, image 172), unchanged from 1/26/2017,  which may represent proteinaceous vaginal or Bartholin's gland cyst.  Not significantly changed.    Abdominal vasculature is patent and within normal limits. The portal  and hepatic veins are patent. The splenic vein is patent.    Asymmetric soft tissue edema of the left anterior thigh soft tissues  and fluid within the tracking along the anterior thigh muscle fascia.  No aggressive appearing osseous lesions.      Impression    IMPRESSION:   1. Recurrent left pleural effusions and patchy consolidative and  groundglass opacities of the lung bases, suggestive of pulmonary  edema, mildly nodular appearance predominantly the left lung base,  infection or aspiration remain within the differential.  2. Anasarca with asymmetric soft tissue edema and fluid along the  fascia of the left anterior thigh compartment.  3. Moderate abdominal ascites. No dilated loops bowel, pneumatosis,  portal venous gas or other late stage findings suggestive of bowel  ischemia.    I have personally reviewed the examination and initial interpretation  and I agree with the findings.    ESTEE ARCINIEGA MD   XR Chest Port 1 View    Narrative    Exam:  Chest X-ray 12/17/2017 6:45 AM    History: Intubated, pulmonary edema;     Comparison: Chest x-ray dated 12/16/2017    Findings: AP portable chest x-ray. The endotracheal tube tip in the  mid intrathoracic trachea. Right IJ central venous catheter with the  tip projecting over the low SVC. Partially visualized enteric tube  with the sidehole projecting over the stomach. The cardiac silhouette  is stable. Redemonstration of diffuse patchy airspace opacities.  Surgical changes of left upper lobe wedge resection. No pneumothorax.  The upper abdomen is unremarkable.      Impression    Impression: Mild decrease in diffuse airspace opacities, atypical  infection versus pulmonary edema.    I have personally reviewed the  examination and initial interpretation  and I agree with the findings.    DAVID VANCE MD   XR Chest Port 1 View    Narrative    EXAM: XR CHEST PORT 1 VW  12/17/2017 8:18 PM      HISTORY: Increasing oxygen requirements;     COMPARISON: Chest radiograph done earlier today, 12/16/2017,  12/15/2017    FINDINGS:     Single AP view of the chest. Endotracheal tube tip projects  approximately 4.4 cm above the coleman. Right IJ central venous  catheter tip projects at the the superior cavoatrial junction. Enteric  tube courses below the diaphragm with the tip collimated. The  cardiomediastinal silhouette is within normal limits. Increased  nodular opacities involving the bilateral lungs, most pronounced in  the right mid and bilateral lower lungs.     Postsurgical changes of left upper lobe wedge resection. Bilateral  small pleural effusions. No pneumothorax.       Impression    IMPRESSION:   1. Increased nodular opacities involving the bilateral lungs, most  pronounced in the right mid and bilateral lower lungs. Differential  worsening infection, hemorrhage and/or atelectasis.  2. Bilateral small pleural effusions.    I have personally reviewed the examination and initial interpretation  and I agree with the findings.    ESTEE ARCINIEGA MD   XR Chest Port 1 View    Narrative    Exam: XR CHEST PORT 1 VW, 12/18/2017 6:11 AM    Indication: Intubated, pulmonary edema;     Comparison: 12/17/2017    Findings:   There is a single semiupright view of the chest. There is a thin  enteric tube projecting over the esophagus and stomach with sidehole  distal to the GE junction. There is a right-sided central venous  catheter terminating at the mid SVC. Endotracheal tube projects over  the mid intrathoracic trachea. The heart is of normal size. The lungs  demonstrate interval decrease of nodular opacities, previously  described 12/17/2017 with stable diffuse background opacity the  lungs..      Impression    Impression: Interval  decrease of nodular opacities involving the  bilateral lungs which were previously described 12/17/2017 with stable  diffuse parenchymal opacities in the lungs..    I have personally reviewed the examination and initial interpretation  and I agree with the findings.    DAVID VANCE MD   XR Chest Port 1 View    Narrative    Exam: XR CHEST PORT 1 VW, 12/19/2017 6:44 AM    Indication: Intubated, pulmonary edema, status post CCB overdose;     Comparison: 12/18/2017    Findings:   There is a single prone view of the chest. The right IJ catheter  terminates at the mid SVC. The enteric tube projects over the  esophagus and stomach and continues out of the field-of-view. There is  continued improvement of the bilateral nodular opacities with stable  appearance of the diffuse bilateral parenchymal opacity of lung.      Impression    Impression: Continued interval decrease of nodular opacities involving  the bilateral lungs with stable diffuse parenchymal opacities of the  lungs.    I have personally reviewed the examination and initial interpretation  and I agree with the findings.    DAVID VANCE MD   XR Chest Port 1 View    Narrative    Exam: XR CHEST PORT 1 VW, 12/19/2017 9:17 AM    Indication: intubation,status post CCB overdose;     Comparison: 12/19/2017    Findings:   There is a single semiupright view of the chest. There is a right IJ  CVC terminating in the mid SVC. The endotracheal tracheal tube  terminates 2.5 cm above the coleman. There is a enteric tube projecting  over the esophagus and stomach and continues out of the field of view  inferiorly. Bilateral lung nodular opacities are unchanged from study  dated same day.      Impression    Impression:   1. The endotracheal tube with slightly advanced from 3 cm above the  coleman 2.5 cm.  2. Stable appearance of bilateral nodular opacities from earlier study  dated same day.    I have personally reviewed the examination and initial interpretation  and I agree  with the findings.    DAVID VANCE MD   XR Chest Port 1 View    Narrative    Exam: XR CHEST PORT 1 VW, 12/20/2017 6:05 AM    Indication: Intubated, pulmonary edema;     Comparison: Yesterday    Findings:   Heart is within normal limits. Pulmonary vasculature is obscured by  the diffuse opacity throughout both lungs. Slight interval clearing  especially in the upper lobes. Small effusions may be present.    Support devices are stable.      Impression    IMPRESSION: Slight improvement in the diffuse bilateral opacities.    HUDSON WHITTAKER MD   US Abdomen Limited Portable    Narrative    Ultrasound abdomen limited portable 12/20/2017    COMPARISON: 1/27/2017, history: Dropping hemoglobin, concern for  bleeding in abdomen. Patient too unstable to go to CT scan.    TECHNIQUE: Limited grayscale evaluation of the abdomen with scanning  in all 4 quadrants for fluid.    FINDINGS: Trace perihepatic and right perirenal ascites. No other  fluid in the abdomen to suggest intra-abdominal bleeding. No focal  abnormality within the partially visualized liver or kidney.      Impression    IMPRESSION: Trace perihepatic and right perirenal ascites. No  sonographic evidence for intra-abdominal hemorrhage.    JMAES HOWELL MD ([place])   XR Chest Port 1 View    Narrative    Exam: XR CHEST PORT 1 VW, 12/20/2017 4:54 PM    Indication: RN placed PICC - verify tip placement    Comparison: Radiograph 12/20/2017, 12/19/2017, 3/8/2017    Findings:   A single AP view of the chest was obtained. The endotracheal tube tip  projects of the mid trachea. The left upper extremity PICC tip  projects over the low SVC. The right IJ catheter tip projects over the  mid/lower SVC. The NG/OG tube tip extends below the field-of-view with  the sidehole projecting over the stomach. The cardiomediastinal  silhouette is unchanged. No pneumothorax. Stable suture line in the  left apex. No substantial pleural effusion. Stable to mildly improved  basilar predominant  diffuse hazy and interstitial opacities.      Impression    Impression:   1. The new left upper extremity PICC tip projects over the low SVC.  2. Stable to mildly improved diffuse pulmonary opacities, which may  represent ARDS, pulmonary edema, or infection.    I have personally reviewed the examination and initial interpretation  and I agree with the findings.    DILLON RAMIREZ MD   CT Head w/o Contrast    Narrative    CT HEAD W/O CONTRAST 12/21/2017 12:16 AM    Provided History: dilated pupils - r/o IC bleed;     Comparison: CT 12/13/2017, 3/9/2017.    Technique: Using multidetector thin collimation helical acquisition  technique, axial, coronal and sagittal CT images from the skull base  to the vertex were obtained without intravenous contrast.     Findings:      Generalized decreased gray-white differentiation. Mild diffuse  cortical sulci effacement. No obvious herniation.     No intracranial hemorrhage, mass effect, or midline shift. The  ventricles are proportionate to the cerebral sulci. The gray to white  matter differentiation of the cerebral hemispheres is preserved. The  basal cisterns are patent.    Mild mucosal thickening involving the bilateral sphenoid sinuses.  Small dependent fluid in the bilateral maxillary sinuses. Fluid in the  bilateral mastoid air cells.      Impression    Impression:   1. No acute intracranial pathology.  2. Mild mucosal thickening involving the bilateral sphenoid sinuses.  Small dependent fluid in the bilateral maxillary sinuses. Fluid in the  bilateral mastoid air cells. These findings are all new since the  prior study.    I have personally reviewed the examination and initial interpretation  and I agree with the findings.    TIMOTHY BAER MD   XR Chest Port 1 View    Narrative    Exam: XR CHEST PORT 1 VW, 12/21/2017 6:24 AM    Indication: Intubated, pulmonary edema;     Comparison: 12/20/2017    Findings:   There is a single some upright view of the chest.  Endotracheal  tracheal tube in place in the mid thoracic trachea approximately 2.5  cm above the coleman. There is a left-sided PICC line terminating over  the inferior SVC. There is a right-sided IJ CVC projecting over the  mid SVC. There is a enteric tube projecting over the esophagus and  stomach and proceeding outside the field-of-view. The sidehole is  distal to the GE junction. The cardiomediastinal silhouette is stable.  The upper abdomen is unremarkable. Stable to slightly improved diffuse  hazy and interstitial opacities, worse in the lower lobe.      Impression    Impression: Stable versus slightly improved diffuse patchy opacities  which could represent pulmonary edema, infection, and/or ARDS.    I have personally reviewed the examination and initial interpretation  and I agree with the findings.    DAVID VANCE MD   XR Chest Port 1 View    Narrative    Exam: XR CHEST PORT 1 VW, 12/22/2017 6:22 AM    Indication: Intubated, pulmonary edema;     Comparison: 12/21/2017    Findings:   There is a single semiupright view of the chest.Endotracheal tube  projecting over the mid thoracic trachea approximately 3 cm above the  coleman. There is a enteric tube projecting over the esophagus and  stomach extending beyond the field-of-view, the sidehole is distal GE  junction. Left-sided PICC line projecting over the cavoatrial  junction. There is a right IJ CVC projecting over the cavoatrial  junction. There are increased hazy and interstitial opacities over the  right lower lobe.      Impression    Impression:   1. Stable support devices associated above.  2. Slightly increased hazy and interstitial opacities over the right  lower lobe. This could represent slightly worsening pulmonary edema,  infection and or ARDS.    I have personally reviewed the examination and initial interpretation  and I agree with the findings.    DAVID VANCE MD   XR Abdomen Port 1 View    Narrative    Exam: Abdominal x-ray, 1 view,  12/22/2017.    COMPARISON: 12/14/2017.    HISTORY: Concern for C diff.    FINDINGS: Supine view of the abdomen was obtained. The left lateral  portions of the abdomen were collimated out of the image.  Nonobstructive bowel gas pattern. NG/OG tube with its tip and sidehole  projecting over the stomach. No pneumatosis. No portal venous gas.  Evaluation of free air is limited in the supine position.      Impression    IMPRESSION: Nonobstructive bowel gas pattern. NG/OG tube with its tip  and sidehole projecting over the stomach.    BAILEY GONZALEZ MD   XR Chest Port 1 View    Narrative    Exam: XR CHEST PORT 1 VW, 12/22/2017 10:00 PM    Indication: Patient intubated    Comparison: 12/22/2017, 12/21/2017, 12/20/2017    Findings:   A single portable AP view the chest was obtained. The endotracheal  tube tip projects over the mid trachea. The enteric tube passes below  the field-of-view. The right IJ catheter tip projects over the low  SVC. The left upper extremity PICC tip projects over the cavoatrial  junction. The cardiomediastinal silhouette is within normal limits  given patient rotation. No pneumothorax or substantial pleural  effusion although the right costophrenic angle is not included.  Postoperative changes of wedge resection in the left apex. Stable to  mildly improved bibasilar opacities.      Impression    Impression:   1. Stable to mildly improved bibasilar hazy airspace opacities since  earlier on the same date.  2. Stable support devices.    I have personally reviewed the examination and initial interpretation  and I agree with the findings.    OMER PACE MD   XR Chest Port 1 View    Narrative    EXAM: XR CHEST PORT 1 VW  12/23/2017 6:36 AM      HISTORY: patient intubated;     COMPARISON: Chest radiograph 12/22/2017, 12/21/2017    Findings:   A single portable AP view the chest was obtained. The endotracheal  tube tip projects 2.4 cm above the coleman. The enteric tube extends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 denies acute suicidal ideation but given her history, she certainly is at chronic risk.         DEDE MCCANN MD             D: 2018 11:36   T: 2018 12:07   MT:       Name:     KEE TINEO   MRN:      [id number]        Account:       [id number]   :      1970           Consult Date:  2018      Document: [id number][WM1.1]         Revision History        User Key Date/Time User Provider Type Action    > WM1.1 2018  4:02 PM Dede Mccann MD Physician Sign     [N/A] 2018 12:07 PM Dede Mccann MD Physician Edit            Consults by Dede Mccann MD at 2018 11:42 AM     Author:  Dede Mccann MD Service:  Psychiatry Author Type:  Physician    Filed:  2018 11:42 AM Date of Service:  2018 11:42 AM Creation Time:  2018 11:42 AM    Status:  Signed :  Dede Mccann MD (Physician)     Consult Orders:    1. Psychiatry IP Consult: follow up; Consultant may enter orders: Yes; Patient to be seen: Routine; Call back #: [phone] [[id number]] ordered by Negin Mathias PA-C at 18 0952                Patient seen and chart reviewed. Formal consult dictated.(F/U)    Dede Mccann MD[WM1.1]     Revision History        User Key Date/Time User  Provider Type Action    > WM1.1 1/2/2018 11:42 AM William Mccann MD Physician Sign            Consults by David Gonzalez MD at 12/27/2017  3:56 PM     Author:  David Gonzalez MD Service:  Psychiatry Author Type:  Physician    Filed:  12/27/2017  4:03 PM Date of Service:  12/27/2017  3:56 PM Creation Time:  12/27/2017  3:56 PM    Status:  Signed :  David Gonzalez MD (Physician)     Consult Orders:    1. Psychiatry IP Consult: Intentional OD, now extubated. Ongoing need for sitter?; Consultant may enter orders: Yes; Patient to be seen: Routine; Call back #: [phone] [[id number]] ordered by Nehal Munson PA-C at 12/26/17 1100                This is a follow-up psychiatry consult.    [facility], [place]   Psychiatric Consult Follow-up Note        Interim History:   The patient's chart was reviewed. I spoke with the patient directly.    Consult question: Does sitter need to be continued at this time.    She reports that she is glad to be alive. She indicates that she was doing well before her mood began to spiral quickly. She currently has no suicidal thoughts. She is not interested in CD treatment at this time an her goal is to transition home and continue with outpatient treatment.         Medications:[TP1.1]       oxyCODONE IR  5 mg Oral Q6H     pantoprazole  40 mg Oral QAM     folic acid  1 mg Oral Daily     cloNIDine  0.1 mg Oral TID     vitamin  B-1  100 mg Oral Daily     predniSONE  20 mg Oral Daily     insulin aspart  1-12 Units Subcutaneous Q4H     mirtazapine  15 mg Oral At Bedtime     ipratropium - albuterol 0.5 mg/2.5 mg/3 mL  3 mL Nebulization 4x daily     OLANZapine zydis  5 mg Oral BID     carvedilol  6.25 mg Oral or Feeding Tube BID     levothyroxine  50 mcg Oral or Feeding Tube Daily     vancomycin  125 mg Oral 4x Daily     heparin lock flush  5-10 mL Intracatheter Q24H     naloxone  3 mg Oral or Feeding Tube TID     enoxaparin  40  "mg Subcutaneous Q24H[TP1.2]          Allergies:[TP1.1]     Allergies   Allergen Reactions     Amoxicillin-Pot Clavulanate      PN: LW Reaction: Itching, Pruritis     Azithromycin      Other reaction(s): Dermatitis     Cephalexin      PN: LW Reaction: Itching, Pruritis     Ciprofloxacin Other (See Comments)     Joint pain cdiff     Compazine [Prochlorperazine] Other (See Comments)     dystonia     Metoclopramide Other (See Comments)     \"I feel like I am crawling out of my skin\"     Minocycline Nausea and Vomiting     PN: DIARRHEA, VOMITING, AND STOMACH CRAMPS     Penicillins Itching     Reglan [Metoclopramide Hcl] Other (See Comments)     Sensation of \"crawling out of skin\"     Restoril [Temazepam]      Thorazine [Chlorpromazine] Other (See Comments)     dystonia     Abilify [Aripiprazole] Rash     Lamotrigine Rash     Severe drug rash - contraindication to receiving again. Skin peeling.      Sulfa Drugs Rash[TP1.2]          Labs:[TP1.1]     Recent Results (from the past 24 hour(s))   Glucose by meter    Collection Time: 12/26/17  7:59 PM   Result Value Ref Range    Glucose 150 (H) 70 - 99 mg/dL   Glucose by meter    Collection Time: 12/27/17  3:49 AM   Result Value Ref Range    Glucose 108 (H) 70 - 99 mg/dL   CBC with platelets differential    Collection Time: 12/27/17 10:04 AM   Result Value Ref Range    WBC 22.8 (H) 4.0 - 11.0 10e9/L    RBC Count 3.09 (L) 3.8 - 5.2 10e12/L    Hemoglobin 9.6 (L) 11.7 - 15.7 g/dL    Hematocrit 30.9 (L) 35.0 - 47.0 %     78 - 100 fl    MCH 31.1 26.5 - 33.0 pg    MCHC 31.1 (L) 31.5 - 36.5 g/dL    RDW 21.3 (H) 10.0 - 15.0 %    Platelet Count 363 150 - 450 10e9/L    Diff Method Automated Method     % Neutrophils 81.0 %    % Lymphocytes 7.2 %    % Monocytes 5.4 %    % Eosinophils 2.6 %    % Basophils 0.2 %    % Immature Granulocytes 3.6 %    Nucleated RBCs 0 0 /100    Absolute Neutrophil 18.4 (H) 1.6 - 8.3 10e9/L    Absolute Lymphocytes 1.6 0.8 - 5.3 10e9/L    Absolute Monocytes " 1.2 0.0 - 1.3 10e9/L    Absolute Eosinophils 0.6 0.0 - 0.7 10e9/L    Absolute Basophils 0.1 0.0 - 0.2 10e9/L    Abs Immature Granulocytes 0.8 (H) 0 - 0.4 10e9/L    Absolute Nucleated RBC 0.0     Platelet Estimate Confirming automated cell count    Comprehensive metabolic panel    Collection Time: 12/27/17 10:04 AM   Result Value Ref Range    Sodium 138 133 - 144 mmol/L    Potassium 4.6 3.4 - 5.3 mmol/L    Chloride 105 94 - 109 mmol/L    Carbon Dioxide 24 20 - 32 mmol/L    Anion Gap 8 3 - 14 mmol/L    Glucose 219 (H) 70 - 99 mg/dL    Urea Nitrogen 26 7 - 30 mg/dL    Creatinine 0.60 0.52 - 1.04 mg/dL    GFR Estimate >90 >60 mL/min/1.7m2    GFR Estimate If Black >90 >60 mL/min/1.7m2    Calcium 8.5 8.5 - 10.1 mg/dL    Bilirubin Total 4.1 (H) 0.2 - 1.3 mg/dL    Albumin 2.5 (L) 3.4 - 5.0 g/dL    Protein Total 6.1 (L) 6.8 - 8.8 g/dL    Alkaline Phosphatase 1143 (H) 40 - 150 U/L     (H) 0 - 50 U/L     (H) 0 - 45 U/L   Phosphorus    Collection Time: 12/27/17 10:04 AM   Result Value Ref Range    Phosphorus 2.2 (L) 2.5 - 4.5 mg/dL   Prealbumin (AM Draw)    Collection Time: 12/27/17 12:54 PM   Result Value Ref Range    Prealbumin 25 15 - 45 mg/dL   Lactic acid whole blood    Collection Time: 12/27/17 12:54 PM   Result Value Ref Range    Lactic Acid 1.8 0.7 - 2.0 mmol/L[TP1.2]          Psychiatric Examination:[TP1.1]     /85  Temp 99  F (37.2  C) (Oral)  Resp 24  Wt 55.6 kg (122 lb 9.2 oz)  SpO2 94%  BMI 21.71 kg/m2[TP1.2]  Weight is[TP1.1] 122 lbs 9.21 oz  Body mass index is 21.71 kg/(m^2).[TP1.2]  Orthostatic Vitals     None            Appearance: dressed in hospital scrubs and extremely fatigued and unkempt  Attitude:  cooperative  Eye Contact:  good  Mood:  sad   Affect:  intensity is blunted and tired  Speech:  soft and labored due to shortness of breath  Psychomotor Behavior:  no evidence of tardive dyskinesia, dystonia, or tics   Gait: Extremely weak. Required a lot of help to get from bed to  chair.  Throught Process:  linear and goal oriented  Associations:  no loose associations  Thought Content:  no evidence of suicidal ideation or homicidal ideation and no evidence of psychotic thought  Insight:  good  Judgement:  fair  Oriented to:  time, person, and place  Attention Span and Concentration:  intact  Recent and Remote Memory:  intact   Fund of Knowledge: Adequate         Assessment and Recommendations:       The patient is a 47-year-old woman with multiple past suicide attempts that was admitted following a suicide attempt. She was intubated and has been treated in the ICU. Now she is more conversant. She continued to be physically quite weak and likely not ready to leave for home at this time. She currently is denying suicidal thinking and is grateful to be alive.    Recs:  -Continue with previously recommended psychiatric medications  -At this time, she appears to be at low imminent risk here in the hospital and does not appear to need a sitter. Should she appear more depressed or again endorse suicidal thinking I would recommend restarting sitter and consulting psychiatry.  -Please consult psychiatry as she approaches discharge to better determine disposition. It is unclear at this time if she will require inpatient treatment.[TP1.1]          Revision History        User Key Date/Time User Provider Type Action    > TP1.2 12/27/2017  4:03 PM David Gonzalez MD Physician Sign     TP1.1 12/27/2017  3:56 PM David Gonzalez MD Physician             Consults by Lolita Colon PA-C at 12/27/2017 12:40 PM     Author:  Lolita Colon PA-C Service:  Gastroenterology Author Type:  Physician Assistant - C    Filed:  12/27/2017  3:29 PM Date of Service:  12/27/2017 12:40 PM Creation Time:  12/27/2017 12:40 PM    Status:  Signed :  Lolita Colon PA-C (Physician Assistant - C)     Consult Orders:    1. GI Pancreaticobiliary Adult IP Consult: Patient to be seen: Routine within 24  hrs; Call back #: [phone]; ALP elevtion in pt with severe OD, other LFTs normal. Lipase 1775. 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  Her hospital course has been complicated by acute respiratory failure requiring intubation (now extubated as of 12/24), hypotension and shock requiring vasopressors. GI consulted today for evaluation of elevated liver tests and elevated lipase in the setting of epigastric abdominal pain. Alk phos significantly elevated to >1100 today with Tbili 4.1 (peaked at 8.4 on 12/23). Additionally today lipase was checked and found to be 1775. US abdomen completed 12/26 showing nondilated system with the common bile duct of 2 mm along with gallbladder sludge. Differential at this point is very broad and will consider medication reaction/drug induced autoimmune hepatitis, elevated LFT of the critically ill and less likely biliary obstruction. Due to lipase elevation and epigastric abdominal pain we should rule out biliary obstruction with MR/MRCP.    Abdominal pain likely multifactorial including Cdiff colitis, pancreatitis, opioid withdrawal and hepatitis. Nutrition is paramount at this point and would recommend calorie counts and replacing FT if not meeting goals.    Given h/o recent IVDU will also check for  "viral hepatis and HIV. Would recommend immunizing for HAV/HBV if not immune already.[AH1.2]     Recommendations:  Obtain MR/MRCP   Check INR[AH1.1], viral hepatitis panel, HIV, BAILEY, F-actin, AMA[AH1.2]  Trend LFT[AH1.1]  Calorie counts, consider NJFT (if possible from sinusitis standpoint)[AH1.2]  Continue po vancomycin for C diff treatment (diagnosed 12/22)  Continue NPO status[AH1.1] for MRI[AH1.2]  Analgesia/Antiemetics per primary team  Discussed with[AH1.1] primary team[AH1.2]    Gastroenterology follow up recommendations:[AH1.1] TBD[AH1.2]    Thank you for involving us in this patient's care. Please do not hesitate to contact the GI service with any questions or concerns.     Pt seen and care plan discussed with [AH1.1] Carlito[1.2], GI staff physician.    Lolita Colon PA-C     Advanced Endoscopy/Pancreaticobiliary LOS  [facility]  Pager *[phone]  -------------------------------------------------------------------------------------------------------------------       Reason for Consultation:   \"ALP elevation in pt with severe OD, lipase 1775. US with sludge only\"           History of Present Illness:   Patient seen and examined at[AH1.1] 1300[AH1.2]. History is obtained from[AH1.1] the patient in the ICU[AH1.2].    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 Her hospital course has been complicated by acute respiratory failure requiring intubation (now extubated as of 12/24), hypotension and shock requiring vasopressors. GI consulted today for evaluation of elevated liver tests and elevated lipase in the setting of epigastric abdominal " pain.  Patient reports that she has diffuse abdominal pain, cannot localize further.  She has never had pain like this before.  She reports that the oxycodone helps a little bit.  She denies nausea or vomiting.  She has not been having fevers.  She denies a history of pancreatitis.  She has been having multiple loose stools, was diagnosed with Clostridium difficile colitis on December 22 started on po vancomycin.     Most recent vitals included temperature of 99 F, blood pressure 133/103, respiratory rate 33, with O2 saturation of 90% on 2 L nasal cannula.  Today her liver tests are elevated including a total bilirubin of 4.1, alkaline phosphatase of 1143,  and AST of 190.  The alkaline phosphatase has slowly been increasing over the past few days. Her total bilirubin peaked at 8.3 on 12/23.  Her white blood cell count is 22K today which is down from 30K on 12/22 (when C diff was diagnosed and treatment started). Most recent INR was 1.53, checked on 12/22. US abdomen completed 12/26 showing nondilated system with the common bile duct of 2 mm along with gallbladder sludge - no cholecystitis.[AH1.2]              Past Medical History:   Reviewed and edited as appropriate  Past Medical History:   Diagnosis Date     Anxiety      Bipolar disorder (H)      C. difficile colitis      Depressive disorder      Essential hypertension 7/8/2015     Gastro-oesophageal reflux disease      Hypothyroidism 4/1/2015     Migraine      Spontaneous pneumothorax     x3, resolved w/ pleurodesis      Suicide attempt             Past Surgical History:   Reviewed and edited as appropriate   Past Surgical History:   Procedure Laterality Date     ARTHROSCOPY KNEE      L knee     CERVIX SURGERY      for pre-cancerous changes     left knee surgery       ORTHOPEDIC SURGERY      L shoulder     THORACIC SURGERY      for pneumothorax, pleurodesis and lobe resection              Social History:[AH1.1]   -Going through divorce (which apparently  "prompted suicide attempt)  -H/o heavy alcohol use, reports up to 1L per day for the past year. Also reports h/o IV heroin (\"one time\") within the last year. States that she \"dabbled\" in other drugs too but doesn't elaborate.[AH1.2]            Family History:   Reviewed and edited as appropriate  Family History   Problem Relation Age of Onset     Depression Mother      Lipids Father      hyperlipidemia     Macular Degeneration Father              Allergies:   Reviewed and edited as appropriate     Allergies   Allergen Reactions     Amoxicillin-Pot Clavulanate      PN: LW Reaction: Itching, Pruritis     Azithromycin      Other reaction(s): Dermatitis     Cephalexin      PN: LW Reaction: Itching, Pruritis     Ciprofloxacin Other (See Comments)     Joint pain cdiff     Compazine [Prochlorperazine] Other (See Comments)     dystonia     Metoclopramide Other (See Comments)     \"I feel like I am crawling out of my skin\"     Minocycline Nausea and Vomiting     PN: DIARRHEA, VOMITING, AND STOMACH CRAMPS     Penicillins Itching     Reglan [Metoclopramide Hcl] Other (See Comments)     Sensation of \"crawling out of skin\"     Restoril [Temazepam]      Thorazine [Chlorpromazine] Other (See Comments)     dystonia     Abilify [Aripiprazole] Rash     Lamotrigine Rash     Severe drug rash - contraindication to receiving again. Skin peeling.      Sulfa Drugs Rash            Medications:[AH1.1]     Current Facility-Administered Medications   Medication     oxyCODONE IR (ROXICODONE) tablet 5 mg     labetalol (NORMODYNE/TRANDATE) injection 20 mg     pantoprazole (PROTONIX) EC tablet 40 mg     folic acid (FOLVITE) tablet 1 mg     cloNIDine 0.1 mg/mL (CATAPRES) solution 0.1 mg     OLANZapine zydis (zyPREXA) ODT half-tab 2.5 mg     LORazepam (ATIVAN) tablet 1-2 mg    Or     LORazepam (ATIVAN) injection 1-2 mg     ondansetron (ZOFRAN) solution 4 mg     thiamine tablet 100 mg     predniSONE (DELTASONE) tablet 20 mg     ibuprofen (ADVIL/MOTRIN) " suspension 400-600 mg     insulin aspart (NovoLOG) inj (RAPID ACTING)     mirtazapine (REMERON) tablet 15 mg     ipratropium - albuterol 0.5 mg/2.5 mg/3 mL (DUONEB) neb solution 3 mL     OLANZapine zydis (zyPREXA) ODT tab 5 mg     carvedilol (COREG) suspension 6.25 mg     hydrALAZINE (APRESOLINE) injection 10 mg     ipratropium - albuterol 0.5 mg/2.5 mg/3 mL (DUONEB) neb solution 3 mL     levothyroxine (SYNTHROID/LEVOTHROID) tablet 50 mcg     vancomycin (VANOCIN) solution 125 mg     glucose 40 % gel 15-30 g    Or     dextrose 50 % injection 25-50 mL    Or     glucagon injection 1 mg     sodium chloride (OCEAN) 0.65 % nasal spray 1 spray     lidocaine (LMX4) kit     heparin lock flush 10 UNIT/ML injection 2-5 mL     dextrose 10 % 1,000 mL infusion     sodium chloride (PF) 0.9% PF flush 10-20 mL     heparin lock flush 10 UNIT/ML injection 5-10 mL     heparin lock flush 10 UNIT/ML injection 5-10 mL     naloxone (NARCAN) suspension 3 mg     0.9% sodium chloride infusion     enoxaparin (LOVENOX) injection 40 mg     bisacodyl (DULCOLAX) Suppository 10 mg     potassium phosphate 10 mmol in D5W 250 mL intermittent infusion     potassium phosphate 15 mmol in D5W 250 mL intermittent infusion     potassium phosphate 20 mmol in D5W 500 mL intermittent infusion     potassium phosphate 20 mmol in D5W 250 mL intermittent infusion     potassium phosphate 25 mmol in D5W 500 mL intermittent infusion     naloxone (NARCAN) injection 0.1-0.4 mg     potassium chloride SA (K-DUR/KLOR-CON M) CR tablet 20-40 mEq     potassium chloride (KLOR-CON) Packet 20-40 mEq     potassium chloride 20 mEq in 50 mL intermittent infusion     magnesium sulfate 1 gm in 100mL D5W intermittent infusion     magnesium sulfate 2 g in NS intermittent infusion (PharMEDium or FV Cmpd)     magnesium sulfate 3 g in NS intermittent infusion     magnesium sulfate 4 g in 100 mL sterile water (premade)[AH1.3]             Review of Systems:   A complete review of systems  was performed and is negative except as noted in the HPI      Skin:[AH1.1] negative[AH1.2]  Eyes:[AH1.1] positive for scleral icteris[AH1.2]  Ears/Nose/Throat:[AH1.1] positive for sinusitis[AH1.2]  Respiratory:[AH1.1] No shortness of breath, dyspnea on exertion, cough, or hemoptysis[AH1.2]  Cardiovascular:[AH1.1] negative[AH1.2]  Gastrointestinal:[AH1.1] positive for abdominal pain and diarrhea[AH1.2]  Genitourinary:[AH1.1] negative[AH1.2]  Musculoskeletal:[AH1.1] negative[AH1.2]  Neurologic:[AH1.1] negative[AH1.2]  Psychiatric:[AH1.1] positive for anxiety, depression, excessive alcohol consumption and suicide attempt[AH1.2]  Hematologic/Lymphatic/Immunologic:[AH1.1] negative[AH1.2]  Endocrine:[AH1.1] negative[AH1.2]         Physical Exam:   Temp: 98.9  F (37.2  C) Temp src: Axillary BP: 156/86   Heart Rate: 96 Resp: (!) 35 SpO2: 93 % O2 Device: None (Room air) Oxygen Delivery: 2 LPM  Wt:   Wt Readings from Last 2 Encounters:   12/26/17 55.6 kg (122 lb 9.2 oz)   12/13/17 52.2 kg (115 lb)        General:[AH1.1] Ill appearing female[AH1.2] in NAD.  Answers appropriately.    HEENT: Head is AT/NC. Sclera[AH1.1] +[AH1.2]icteric. No conjunctival injection.  Oropharynx is clear, moist and w/o exudate or lesions.  Neck: No masses or thyromegaly.  Lungs: Clear to auscultation bilaterally[AH1.1] anteriorly[AH1.2].  No wheezes, rhonchi or crackles.    Heart: Regular rate and rhythm.  No murmurs, gallops or rubs.  Normal S1 and S2.  Abdomen: Soft,[AH1.1] tender diffusely[AH1.2], non-distended.  BS +.  No hepatosplenomegaly. No rebound or peritoneal signs  Extremities: No pedal edema.  Heme/Lymph: No cervical or supraclavicular adenopathy.   Skin:[AH1.1] +[AH1.2] jaundice,[AH1.1] no[AH1.2] rash  Neurologic: Grossly non-focal.  CN 2-12 grossly intact           Data:   Labs and imaging below were independently reviewed and interpreted    LAB WORK:    BMP  Recent Labs  Lab 12/27/17  1004 12/26/17  0520 12/25/17  1758 12/25/17  0436     142 140 150*   POTASSIUM 4.6 3.4 3.5 3.6   CHLORIDE 105 104 103 112*   ISAURO 8.5 7.8* 7.4* 7.6*   CO2 24 28 28 30   BUN 26 42* 54* 64*   CR 0.60 0.73 0.78 0.73   * 127* 197* 94     CBC  Recent Labs  Lab 12/27/17  1004 12/26/17  0520 12/25/17  0433   WBC 22.8* 20.6* 23.4*   RBC 3.09* 2.85* 2.87*   HGB 9.6* 8.9* 8.7*   HCT 30.9* 27.5* 27.2*    97 95   MCH 31.1 31.2 30.3   MCHC 31.1* 32.4 32.0   RDW 21.3* 21.0* 20.7*    299 249     INR  Recent Labs  Lab 12/22/17  0324   INR 1.53*     LFTs  Recent Labs  Lab 12/27/17  1004 12/26/17  0520 12/25/17  0433 12/24/17  0429   ALKPHOS 1143* 966* 852* 771*   * 194* 181* 184*   * 148* 138* 131*   BILITOTAL 4.1* 4.1* 4.8* 5.9*   PROTTOTAL 6.1* 5.2* 5.2* 4.8*   ALBUMIN 2.5* 2.3* 2.5* 2.2*      PANC  Recent Labs  Lab 12/26/17  0520   LIPASE 1775*       IMAGING:  EXAMINATION: US ABDOMEN LIMITED WITH DOPPLER COMPLETE, 12/26/2017  11:37 AM      COMPARISON: Ultrasound dating 12/20/2017.     HISTORY: Elevated alkaline phosphatase, concern for obstruction.     FINDINGS:   Fluid: No evidence of ascites or pleural effusions.     Liver: The liver demonstrates normal echotexture, measuring 19.4 cm in  craniocaudal dimension. There is no focal mass. Main portal vein  measures 9 mm in diameter     Extrahepatic portal vein flow is antegrade, measuring 23 cm/sec.  Right portal vein flow is antegrade, measuring 33 cm/sec.  Left portal vein flow is antegrade, measuring 21 cm/sec.     Flow in the hepatic artery is towards the liver and:  185 cm/sec peak systolic  0.62 resistive index.      The splenic vein is not visualized.  The left, middle, and right  hepatic veins are patent with flow towards the IVC. The IVC is patent  with flow towards the heart.   The aorta is not dilated.     Gallbladder: Layering sludge within the lumen of the gallbladder.  There is no wall thickening, pericholecystic fluid, positive  sonographic Bernstein's sign or evidence for  cholelithiasis.     Bile Ducts: Both the intra- and extrahepatic biliary system are of  normal caliber.  The common bile duct measures 2 mm in diameter.     Pancreas: Obscured by overlying bowel gas.      Kidney: The right kidney measures 10.9 cm long. Parenchyma is of  normal thickness. No focal mass or hydronephrosis., no shadowing renal  calculi, cystic lesion or mass.             IMPRESSION: Gallbladder sludge without evidence of cholelithiasis or cholecystitis. Otherwise normal right upper quadrant ultrasound with Doppler assessment.           =======================================================================[AH1.1]       Revision History        User Key Date/Time User Provider Type Action    > AH1.3 12/27/2017  3:29 PM Lolita Colon PA-C Physician Assistant - C Sign     AH1.2 12/27/2017  1:15 PM Lolita Colon PA-C Physician Assistant - C      AH1.1 12/27/2017 12:40 PM Lolita Colon PA-C Physician Assistant - C             Consults signed by Jesus Cast MD at 12/25/2017 12:12 PM      Author:  Jesus Cast MD Service:  Psychiatry Author Type:  Physician    Filed:  12/25/2017 12:12 PM Date of Service:  12/24/2017  3:59 PM Creation Time:  12/24/2017  6:50 PM    Status:  Signed :  Jesus Cast MD (Physician)         DATE OF SERVICE:  12/24/2017        REASON FOR CONSULTATION:  The ICU Service requested a consultation to assist with management and ultimate placement disposition of this patient who presented with a suicide attempt.      HISTORY OF PRESENT ILLNESS:  Ana Laura Wharton is a 47-year-old female who was initially admitted to the Intensive Care Unit after a suicide attempt via overdose with amlodipine, Nyquil,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 rehab no IV, O2, or complex wounds.      ORA Lindsay, BSN    [facility]    Medicine Group  [address] [place] [address]    [email or web address]    Office: [phone] Pager: [phone][TP1.1]        Revision History        User Key Date/Time User Provider Type Action    > TP1.1 1/5/2018  3:00 PM Clarissa Mccormick RN Care Coordinator Sign            Progress Notes by Clarissa Mccormick RN at 1/5/2018 11:25 AM     Author:  Clarissa Mccormick RN Service:  Care Coordinator Author Type:  Care Coordinator    Filed:  1/5/2018 11:31 AM Date of Service:  1/5/2018 11:25 AM Creation Time:  1/5/2018 11:25 AM    Status:  Signed :  Clarissa Mccormick RN (Care Coordinator)         1/5/2018 11:27 AM[TP1.1]   DC ride rearranged for 630 this evening.  Continue to pend [facility] for insurance approval.  SW send more referrals to TCU's, SW will update insurance with approvals or denials.  RNCC will update dc plan as updates come in.  [facility]  continuing to accept pt for dc td.        Clarissa Mccormick RNCC, BSN    [facility]    Medicine Group  [address] [place] [id number]    [email or web address]    Office: [phone] Pager: [phone][TP1.2]        Revision History        User Key Date/Time User Provider Type Action    > TP1.1 1/5/2018 11:31 AM Clarissa Mccormick RN Care Coordinator Sign     TP1.2 1/5/2018 11:25 AM Clarissa Mccormick RN Care Coordinator             Progress Notes by Kourtney Kang RN at 1/5/2018  8:45 AM     Author:  Kourtney Kang RN Service:  [facility] Nurse Author Type:  Registered Nurse    Filed:  1/5/2018  8:52 AM Date of Service:  1/5/2018  8:45 AM Creation Time:  1/5/2018  8:45 AM    Status:  Signed :  Kourtney Kang RN (Registered Nurse)         [facility] Nurse Inpatient Adult Pressure InjuryAssessment    Followup Assessment  Reason for consultation: Evaluate and treat multiple pressure injuries    Assessment:   Left cheek wound due to Pressure Injury  Pressure Injury appears to be a deep tissue injury that is now improving. Present on admission No  This is a Medical Device Related Pressure Injury (MDRPI) due to ETT securement device    Right cheek wound due to Pressure Injury  Resolved  This is a Medical Device Related Pressure Injury (MDRPI) due to ETT securement device    Left proximal forearm wound due to Pressure Injury  Pressure Injury is unstageable  Present on admission No  This is a Medical Device Related Pressure Injury (MDRPI) due to IV hub while proning    Left distal forearm wound due to Pressure Injury  Pressure Injury is Stage 2. Present on admission No  This is a Medical Device Related Pressure Injury (MDRPI) due to IV cap.       Contributing factor of the pressure injury: immobility, proning (no longer being proned)    Photo: see individual wound assessments below    TREATMENT  PLAN    Bilateral cheek wounds: Wash twice daily with saline and gauze. Gently rub Vaseline  onto wounds to keep moist.   Left forearm (at AC): wash every other day with wound cleanser and gauze, dry thoroughly. Cover with thin hydrocolloid dressing (Comfeel) that has been cut to fit.  Change every 3 days and as needed     Orders Reviewed  WOC Nurse follow-up plan: weekly  Nursing to notify the Provider(s) and re-consult the WOC Nurse if wound(s) deteriorates or new skin concern.    Patient History  According to provider note(s):    Ana Laura Wharton is a 46 yo female with hx of bipolar disorder with depression, polysubstance abuse, & prior suicide attempts, admitted to [facility] MICU  Following calcium channel blocker (CCB) overdose, tylenol overdose & alcohol overdose. Course complicated by lactic acidosis, hypoventilation, acute renal failure, & severe hypotension/vasogenic shock. Currently intubated, sedated, & proned.      Patient proned for lung recruitment last from 17 at 1315 to 17 at 0520 (per nursing notes). On 17 ET tabs changed and pressure injury noted under old tabs. WOC consult placed. Left forearm pressure injury noted under IV during WOC skin assessment.    Objective Data     Current Diet/ Nutrition:    Active Diet Order      Regular Diet Adult      Advance Diet as Tolerated    Output:   I/O last 3 completed shifts:  In: -   Out: 800 [Urine:800]    Skin Assessment: Sujit Sujit Score  Av.6  Min: 8  Max: 20                               Labs:     Recent Labs  Lab 18  0741 18  0618   HGB  --  9.3*   INR 1.10  --    WBC  --  10.9       Physical Exam    Skin assessment:   Focused skin inspection: anterior body      Wound Location:  Left proximal forearm            Left forearm  Wound History: Noted during skin assessment. Wound was found under hub of peripheral IV site  Measurements (length x width x depth, in cm) 1.7 cm x 0.4 cm  x  0.2 cm   Wound Base:  100% Black leathery eschar  Palpation of the wound bed: induration  Periwound skin: up to 2 cm of pink erythema    Temperature: normal   Drainage: none  Description of drainage: none  Odor: none  Pain:none    Wound Location:  Left distal forearm  (see picture above)  Wound History: Noted during skin assessment. Wound was found under hub of peripheral IV site  Measurements (length x width x depth, in cm) 0.5 cm x 0.4 cm  x  0.01 cm   Wound Base: resurfaced epidermis  Palpation of the wound bed: normal   Periwound skin: intact  Color: normal and consistent with surrounding tissue  Temperature: normal   Drainage: none  Description of drainage: none  Odor: none  Pain: none      Interventions  Current support surface: Standard  Atmosair    Current off-loading measures: Pillows under calves  Visual inspection of wound(s) completed  Wound Care:was done per plan of care    Cleansing with saline solution  Supplies: Placed at Bedside  Pt education on wound treatment plan, healing   Discussed plan of care with Nurse[EB1.1]             Revision History        User Key Date/Time User Provider Type Action    > EB1.1 1/5/2018  8:52 AM Kourtney Kang RN Registered Nurse Sign            Progress Notes by Negin Mathias PA-C at 1/4/2018 10:43 AM     Author:  Negin Mathias PA-C Service:  Internal Medicine Author Type:  Physician Assistant    Filed:  1/5/2018  7:45 AM Date of Service:  1/4/2018 10:43 AM Creation Time:  1/5/2018  7:42 AM    Status:  Signed :  Negin Mathias PA-C (Physician Assistant)         [facility]  Internal Medicine Progress Note - [facility] Service    Patient: Ana Laura Wharton  MRN: [id number]  Admission Date: 12/13/2017  Hospital Day # 23    Assessment & Plan   Intentional overdose 12/13 with bipolar disorder, depression, anxiety, etoh abuse, possible benzodiazapine w/d: Overdose with amlodipine, Tylenol, Nyquil and Etoh. PTA drinking 16 oz vodka daily for 1.5 days. Treated with Ca gtt, high dose insulin, and NAC per poison control. Extubated 12/24.  Sitter discontinued 12/28 per psych recs. Psych has been following at least weekly, last seen 1/2, recommend med psych unit (patient not a candidate) vs rehab. LTACH accepted patient on 1/4/18. Once completes rehab, will need intensive day treatment program. Cont remeron 15mg qhs and olazapine 5mg BID. Cont prn ativan 1-2mg q6h prn through 1/4, then decrease to 1-2mg q8h prn starting 1/5. Cont thiamine and folic acid. Psych to cont to follow at LTACH as needed, would benefit from weekly visits if needed.       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 C.diff +12/22. AXR 12/28 no acute findings. Currently on po vancomycin with slow improvement in stooling, unfortunately had to start abx for UTI as below. Has had decreasing stools while on vancomycin, only 1-2 loose stools daily. GI followed and recommended starting long vancomycin as follows: 125mg qid x 14days, 125mg TID x 7d, 125mg BID x 7d, 125mg QD x 7d, 125mg QOD x 7d. If patient needs recurrent antibiotics, need to discuss vanco recs with primary GI MD Dr Dennis.       Acute pancreatitis, transaminitis. Lipase 1775 12/26. Previously normal. US no stones. MRCP normal 12/27. ALP improving 429 (648), Tbili stable ~1.5. ALT, AST stable. Previously had diffuse abdominal tenderness, this resolved on 12/29. GI following, transaminitis thought to be 2/2 cholestasis of sepsis and critical illness. Tolerating diet as of 12/29. Cont regular diet. CMP to be checked weekly at LTACH.       ARDS 2/2 aspiration, acute hypoxic/hypercarbic respiratory failure. Initially d/t overdose, V/Q mismatch in setting of ARDS. S/p intubation (initially prone) flolan, solumedrol, diuretics, and Duobneb. Extubated 12/24. Completed steroids 12/28. VSS. Has no required supplemental oxygen since 12/29. Repeat CXR 1/2/18 with stable diffuse pulmonary opacities R>L. Ongoing dry cough, which will likely persist for weeks. No new fevers. O2 sats stable  on RA. Cough overall improving to near resolved. Continue aggressive pulm toilet with acapella and IS on discharge. Cont tessalon pearls for cough.      Severe malnutrition. Prealbumin 25 12/28. Very limited PO intake, patient reports due to high volume BM. Would like to avoid NJ at all lengths. PO intake improved after long discussion about TFs on 12/29. Nutrition following and calorie counts improved. Cont regular diet. Cont to monitor po intake and consider nutrition consult if needed at LTACH.       HTN. PTA on Norvasc. Currently on coreg 6.25mg BID and clonidine 0.1mg TID. BPs stable on current regimen. Cont this regimen on discharge.       Pressure injury. Not present on admission. Bilateral cheek and left forearm. WOCN following, last saw 1/2 with recs to wash BID with saline and gauze and gently rub vaseline on cheek wounds, and was left forearmwith recs to cleanse with saline solution. Continue off loading measures. Continue these wound cares on discharge.       Deconditioning. In setting of prolonged hospitalization and recent critical illness. Working with PT/OT. PT/OT currently recommending TCU. Unfortunately given patients recent severity of illness, has been difficult to place. Discharged to LTACH 1/4.       Resolved Hospital Problems and Stable, Chronic Medical Conditions:  Altered mental status. Resolved. Was having waxing and waning mental clarity, which was likely multifactorial including: rapid cycling bipolar with psychotic features, ?drug induced psychosis, ?ICU delirium, ?ongoing infection with c.diff. Has been A&O x 3 over past 72 hours without hallucinations, agitation, or inappropriate comments. Monitor closely.       Likely acute opiate withdrawal. Signs and symptoms of opiate withdrawal noted 12/27, likely 2/2 high fentanyl dosing during intubation (chills, myalgia, diarrhea, diaphoresis, abdominal pain). Although several symptoms can be connected to other etiology (ex. Diarrhea and c  diff, abdominal pain and pancreatitis). Started on scheduled opiates 12/27 with improvement, tapered off completely 12/31. No further s/sx of withdrawal. Cont to monitor for s/sx of withdrawal. Did not require any narcotics prior to discharge.       Dysuria. Patient reports dysuria, increased urinary frequency overnight 12/30. UA with WBC, +LE, neg nitrate. UCx prelim shows candida, also spoke with lab and a gram positive cocci species is growing - they then retracted this and resulted the culture as mixed urogenital mina. S/p macrobid 12/30- 1/2. Stopped macrobid once cx resulted as mixed urogenital mina. Dysuria was likely 2/2 vaginal candidiasis.       Vaginal candidiasis. Wet prep + candida. Neg for trich or BV. S/p diflucan 150mg x 1 on 1/1 and again on 1/4. No recurrence of symptoms. Monitor for recurrence of symptoms on discharge.       Hypernatremia. Elevated from admission to 12/25, normalized 12/26. Thought 2/2 hypovolemia.      ARF. 2/2 CCB overdose. Cr peak 1.62. C/b volume overload and pulmonary edema, treated with IV lasix. Cr since normalized.       SVT. Presented with HR 190s in setting of critical illness following OD, resolved with IVF.       Paroxysmal a fib. Brief episode of a fib 12/20 with conversion to NSR with metoprolol x2. No further episodes. On coreg as above.      Normocytic anemia. Hgb 10.4 on admit. Drop to 5.8 12/19, s/p 1U PRBC. Smear with marked normochromic, normocytic anemia, very rare RBC fragments and spherocytes. Haptoglobin 63, . Concern for possible alveolar hemorrhage as above. Possibly 2/2 chronic Etoh use. Hgb now stable. Cont folic acid.       Coagulopathy. In the setting of etoh use, poor nutrition. S/p vitamin K. No s/s of acute bleeding. Normalized 12/27.      Hypothyroidism. TSH 5.06, T4 normal in setting of critical illness thus no dose adjustments made. Continue PTA synthroid. Needs repeat TFTS on 1/8/17      Incidental MRCP findings. Abnormal soft tissue  posterior to the fundus of the stomach, similar to 1/2017 CT. Needs follow up as outpatient for biopsy on discharge - PCP vs GI to arrange on discharge from LTACH.       GI ppx. In setting of recent OD. Cont PPI qd.       Lactic acidosis. Pt had lactic acid elev to 2.3 on 1/2 after triggering protocol for tachycardia. BC x 2 obtained, CXR obtained -stable with previous. Procalc low. Has known c.diff and resolving ARDS as above. Also patient with poor fluid intake. Repeat lactic acid today showed 0.9 after no changes were made to plan. Only check if triggers protocol.     Diet: regular  Fluids: none  DVT Prophylaxis: lovenox  Code Status: full    Disposition Plan   Expected discharge: Tomorrow; recommended to LTACH once insurance approval for LTACH.   Information in the above section will display in the discharge planner report.      The patient's care was discussed with the Attending Physician, Dr. sharma, Bedside Nurse, Care Coordinator/, Patient and Patient's Family.    Negin Hyde Stew  Internal Medicine Staff Hospitalist Service  [facility]  Pager: [phone]  Please see sticky note for cross cover information    Interval History   Ana Laura is doing well today. Cough continues to improve. Had one large loose stool today. No bladder concerns. No abdominal pain. Tolerating diet. No fevers or chills. Denies SI/HI.     Data reviewed today: I reviewed all medications, new labs and imaging results over the last 24 hours. I personally reviewed labs, imaging, and vitals.     Physical Exam   /73 (BP Location: Right arm)  Pulse 83  Temp 98.1  F (36.7  C) (Oral)  Resp 16  Wt 47.3 kg (104 lb 4.8 oz)  SpO2 95%  BMI 18.48 kg/m2     GENERAL: Alert and oriented x 3. NAD. WDWN. Resting in bed. Pleasant and cooperative.   HEENT: Anicteric sclera. MMM.   CV: RRR.   RESPIRATORY: Effort normal on RA. Lungs CTAB.  GI: Abdomen soft, nd/nt. +BS.   NEUROLOGICAL: No focal deficits. Moves  all extremities.    EXTREMITIES: No peripheral edema. Intact bilateral pedal pulses.   SKIN: ecchymosis on L cheek c/d/i.[CM1.1]            Revision History        User Key Date/Time User Provider Type Action    > CM1.1 1/5/2018  7:45 AM Negin Mathias PA-C Physician Assistant Sign            Progress Notes by Clarissa Mccormick RN at 1/4/2018 10:57 AM     Author:  Clarissa Mccormick RN Service:  Care Coordinator Author Type:  Care Coordinator    Filed:  1/4/2018  4:15 PM Date of Service:  1/4/2018 10:57 AM Creation Time:  1/4/2018 10:57 AM    Status:  Addendum :  Clarissa Mccormick RN (Care Coordinator)           Care Coordinator Progress Note     Admission Date/Time:  12/13/2017  Attending MD:  Kerry Lopez MD     Data  Chart reviewed, discussed with interdisciplinary team.     Coordination of Care and Referrals: Provided patient/family with options for LTACH.        Assessment  RNCC met with pt, per SW we have been unable to secure a TCU because they feel that they are unable to keep patient safe due to her hx of suicide attempts.  Pt will need ongoing therapy in a facility or in pt at hospital until she is strong enough to be safely dc to home and follow up with out pt mental health services.  Per Negin MEI, pt could rehab and then dc to home with out pt mental health follow up.  RNCC met with pt and explained difficulty with obtaining a TCU or ARU and discussed [facility] as a possibility for ongoing rehab and mental health follow up.  Pt agreed with referral.  Pt has a complex mental health hx, and had been in intubated and in ICU at [facility] during this admission for greater than a week (13-25th of December).  RNCC contacted loni Thurston [facility], who is going to review chart and meet with pt.  MD team updated.       [address] [address]  Clinical Liaison phone number: [phone] Karena[TP1.1]     1/4/2018 2:30 PM[TP1.2] Addendum:  Karena contacted this RNCC and notified of acceptance to [facility] pending final insurance review.  She stated that it has gone to the 2nd review for insurance, they are moving forward like pt is approved.  They would like pt transport set up for this evening.  RNCC update PA, met with pt and explained possibility of dc today, pt agrees, states that her  will be here after 4 today and she will update him.  Left [facility] information with pt.  Karena met with pt earlier in the day.  Bedside RN also aware of dc plan.    [facility] Transportation- ride set up for 7 pm via stretcher (no wc ride available for this time).    [phone][TP1.3]     1/4/2018 4:09 PM Addendum: [facility] states that they have not gotten a final approval yet, pt to hold till tomorrow when her insurance can complete the review.   Rescheduled for 1 pm tomorrow via wc ride.[TP1.4]      ORA Lindsay, BSN    [facility]    Medicine Group  [address] [place] [address]    [email or web address]    Office: [phone] Pager: [phone][TP1.1]        Revision History        User Key Date/Time User Provider Type Action    > TP1.4 1/4/2018  4:15 PM Clarissa Mccormick RN Care Coordinator Addend     TP1.2 1/4/2018  2:55 PM Clarissa Mccormick RN Care Coordinator Addend     TP1.3 1/4/2018  2:29 PM Clarissa Mccormick RN Care Coordinator      TP1.1 1/4/2018 11:23 AM Clarissa Mccormick RN Care Coordinator Sign            Progress Notes by Di Wylie LICSW at 1/4/2018  9:01 AM     Author:  Di Wylie LICSW Service:  Social Work Author Type:      Filed:  1/4/2018  3:08 PM Date of Service:  1/4/2018  9:01 AM Creation Time:  1/4/2018  3:01 PM    Status:  Signed :  Di Wylie LICSW ()         Social Work Services Progress Note    Hospital Day: 22  Date of Initial Social Work Evaluation:  12/14/2017  Collaborated with:  [facility] Liaison  ([place] [phone]), RNCC    Data:  SW received notification from [facility] liaison that pt has been globally denied from all of their facilities as they do not feel they can meet pt's mental health needs. Pt has also been declined from [facility], [facility] and  TCU/ARU. Discussed w/ RNCC and she will attempt LTACH placement.     Intervention:  D/C Planning    Assessment:  RNCC to meet w/ pt to discuss LTACH referral.    Plan:    Anticipated Disposition:  Facility:  referred to LTACH-please see RNCC note    Barriers to d/c plan:  Mental Health    Follow Up:  SW to continue to follow, but with global denial from [facility] highly unlikely that any other [facility] TCUs will accept.[NC1.1]          Revision History        User Key Date/Time User Provider Type Action    > NC1.1 1/4/2018  3:08 PM Di Wylie, [facility]  Sign            Progress Notes by Mel Yost RD at 1/4/2018 10:56 AM     Author:  Mel Yost RD Service:  Nutrition Author Type:  Registered Dietitian    Filed:  1/4/2018 11:44 AM Date of Service:  1/4/2018 10:56 AM Creation Time:  1/4/2018 10:56 AM    Status:  Signed :  Mel Yost RD (Registered Dietitian)         CLINICAL NUTRITION SERVICES - REASSESSMENT NOTE     Nutrition Prescription    Malnutrition Status:[RS1.1]    Severe malnutrition in the context of acute illness[RS1.2]     Recommendations already ordered by Registered Dietitian (RD):[RS1.1]  Continue PRN snacks/suppl order[RS1.2]     Future/Additional Recommendations[RS1.1]:  If PO intake and or weight decline, recommend ordering snacks/suppl per pt preference and order calorie counts.[RS1.2]       EVALUATION OF THE PROGRESS TOWARD GOALS   Diet: Regular, PRN snacks/supplements   Intake:[RS1.1] Pt declined full nutrition visit d/t receiving important phone call. Denies any nutrition concerns at this time, reports fair[RS1.2] tobias[RS1.1]etite. Per chart review, appears  majority of recent intakes ranging from % meals.    Calorie counts:  12/29:  1011 kcal, 32 g pro (2 meals)   12/28:    278 kcal, 5 g pro (2 meals)[RS1.2]      NEW FINDINGS   Weight: 47.3 kg yesterday (1/3). Suggests pt down 15 kg (24%) since admit (x3 weeks). Adjusted dosing wt.     ASSESSED NUTRITION NEEDS (Dosing wt of 47 kg):   Estimated Energy Needs: 1410 - 1645 kcal/day (30-35 kcal/kg)  Justification: Underweight   Estimated Protein Needs: 70 - 94 g/day (1.5-2 g/kg)  Justification: Repletion, Increased needs, wound healing     Skin: Per WOCN (1/2)   - BL cheek wounds: Stage 2 PI --> Now improving/resolved.   - L Arm wounds: Unstageable PI on proximal forearm; Stage 2 PI on distal forearm.      MALNUTRITION    % Intake:[RS1.1] < 75% for > 7 days (non-severe)[RS1.2]  % Weight Loss: 24% in 3 weeks (severe)  Subcutaneous Fat Loss:[RS1.1] Thoracic/intercostal:  Mild[RS1.2]  Muscle Loss:[RS1.1] Facial & jaw region, Thoracic region (clavicle, acromium bone, deltoid, trapezius, pectoral), Upper arm (bicep, tricep) and Lower arm  (forearm):  Moderate[RS1.2]  Fluid Accumulation/Edema:[RS1.1] None noted[RS1.2]  Malnutrition Diagnosis:[RS1.1] Severe malnutrition in the context of acute illness[RS1.2]     Previous Goals   Patient to consume >50% of nutritionally adequate meal trays TID, or the equivalent with supplements/snacks  Evaluation:[RS1.1] Met[RS1.2]    Previous Nutrition Diagnosis  Inadequate protein-energy intake related to decreased appetite s/p recent extubation, still with AMS at times as evidenced by pt likely eating 50% or less of most meals.   Evaluation:[RS1.1] Improving[RS1.2]    CURRENT NUTRITION DIAGNOSIS[RS1.1]  Inadequate oral intake[RS1.2] related to[RS1.1] reduced appetite[RS1.2] as evidenced by[RS1.1] pt eating % meals per RN documentation.[RS1.2]       INTERVENTIONS  Implementation[RS1.1]  NA[RS1.2]     Goals[RS1.1]  Patient to consume % of nutritionally adequate meal trays TID,  or the equivalent with supplements/snacks.  Wt to maintain (or ideally gain greater than) 47 kg.[RS1.2]     Monitoring/Evaluation  Progress toward goals will be monitored and evaluated per protocol.    Shanique Yost RD, LD  5A/5B: [phone][RS1.1]       Revision History        User Key Date/Time User Provider Type Action    > RS1.2 1/4/2018 11:44 AM Mel Yost RD Registered Dietitian Sign     RS1.1 1/4/2018 10:56 AM Mel Yost RD Registered Dietitian             Progress Notes by Negin Mathias PA-C at 1/3/2018  7:59 AM     Author:  Negin Mathias PA-C Service:  Internal Medicine Author Type:  Physician Assistant    Filed:  1/4/2018 11:23 AM Date of Service:  1/3/2018  7:59 AM Creation Time:  1/3/2018  7:59 AM    Status:  Addendum :  Negin Mathias PA-C (Physician Assistant)         [facility]  Internal Medicine Progress Note - [facility] Service    Patient: Ana Laura Wharton  MRN: [id number]  Admission Date: 12/13/2017  Hospital Day # 21    Assessment & Plan   Ana Laura Wharton is a 47 year old female with history of HTN, bipolar disorder, anxiety, depression with multiple suicide attempts, 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 ICU course c/b lactic acidosis, acute hypoxic/hypercarbic respiratory failure 2/2 aspiration requiring intubation c/b ARDS, ARF, severe hypotension/vasogenic shock requiring pressors, paroxysmal A.fib, intermittent SVT, and sepsis 2/2 sinusitis. Patient extubated 12/24/17, transferred to medicine 12/25 for ongoing care.       Intentional overdose 12/13 with bipolar disorder, depression, anxiety, etoh abuse, possible benzodiazapine w/d: Overdose with amlodipine, Tylenol, Nyquil and Etoh. PTA drinking 16 oz vodka daily for 1.5 days. Treated with Ca gtt, high dose  insulin, and NAC per poison control. Extubated 12/24. Sitter discontinued 12/28 per psych recs. Denies active SI, but given significant psych history, will likely benefit from inpatient psychiatric care. Unfortunately, patient has c.diff and not independent of own cares d/t weakness and deconditioning at this time, so not appropriate for inpatient psych on [facility]. Will need stay at rehab before potential transfer to inpatient psych. Psych re-consulted 1/2 recommend med psych unit vs rehab and then intensive day treatment program following.   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 C.diff +12/22. AXR 12/28 no acute findings. Currently on po vancomycin with slow improvement in stooling, unfortunately had to start abx for UTI as below. Has had decreasing stools while on vancomycin, 2 loose stools daily over past few days. Pt still requesting fidoxamicin, but is responding to vancomycin so GI agrees with holding off on this.   - GI following, plan to cont vanco po 125mg QID - will need long taper  - Obtain KUB XR if acute worsening to eval for toxic megacolon       Acute pancreatitis, transaminitis. Lipase 1775 12/26. Previously normal. US no stones. MRCP normal 12/27. ALP improving 586 (648), Tbili stable ~1.5. ALT, AST stable. Previously had diffuse abdominal tenderness, this resolved on 12/29. GI following, transaminitis thought to be 2/2 cholestasis of sepsis and critical illness. Tolerating diet as of 12/29.  - GI following  - Regular diet as tolerated, give IVF if acutely decompensates  - CMP QOD      ARDS 2/2 aspiration, acute hypoxic/hypercarbic respiratory failure. Initially d/t overdose, V/Q mismatch in setting of ARDS. S/p intubation  (initially prone) flolan, solumedrol, diuretics, and Duobneb. Extubated 12/24. Completed steroids 12/28. VSS. Has no required supplemental oxygen since 12/29. Repeat CXR 1/2/18 with stable diffuse pulmonary opacities R>L. Ongoing dry cough, which will likely persist for weeks. No new fevers. O2 sats stable on RA.   - Continue aggressive pulm toilet with acapella and IS  - Tessalon pearls for cough  - PRN duonebs for wheezing      Severe malnutrition. Prealbumin 25 12/28. Very limited PO intake, patient reports due to high volume BM. Would like to avoid NJ at all lengths. PO intake improved after long discussion about TFs on 12/29. Nutrition following and calorie counts improved. Cont regular diet. Appreciate nutrition following. No indication for TF at this time.      HTN. PTA on Norvasc. Currently on coreg 6.25mg BID and clonidine 0.1mg TID. BPs stable on current regimen. Cont to monitor and adjust as needed.       Pressure injury. Not present on admission. Bilateral cheek and left forearm. WOCN following, last saw today 1/2 with recs to wash BID with saline and gauze and gently rub vaseline on cheek wounds, and was left forearmwith recs to cleanse with saline solution. Continue off loading measures.       Deconditioning. In setting of prolonged hospitalization and recent critical illness. Working with PT/OT. PT/OT currently recommending TCU. PMR consulted 1/2/18, awaiting recs. Dispo as outlined above.       Resolved Hospital Problems and Stable, Chronic Medical Conditions:  Altered mental status. Resolved. Was having waxing and waning mental clarity, which was likely multifactorial including: rapid cycling bipolar with psychotic features, ?drug induced psychosis, ?ICU delirium, ?ongoing infection with c.diff. Has been A&O x 3 over past 72 hours without hallucinations, agitation, or inappropriate comments. Monitor closely.       Likely acute opiate withdrawal. Signs and symptoms of opiate withdrawal noted 12/27,  likely 2/2 high fentanyl dosing during intubation (chills, myalgia, diarrhea, diaphoresis, abdominal pain). Although several symptoms can be connected to other etiology (ex. Diarrhea and c diff, abdominal pain and pancreatitis). Started on scheduled opiates 12/27 with improvement, tapered off completely 12/31. No further s/sx of withdrawal. Cont to monitor for s/sx of withdrawal.     Dysuria. Patient reports dysuria, increased urinary frequency overnight 12/30. UA with WBC, +LE, neg nitrate. UCx prelim shows candida, also spoke with lab and a gram positive cocci species is growing - they then retracted this and resulted the culture as mixed urogenital mina. S/p macrobid 12/30- 1/2. Stopped macrobid once cx resulted as mixed urogenital mina. Dysuria was likely 2/2 vaginal candidiasis.     Vaginal candidiasis. Wet prep + candida. Neg for trich or BV. S/p diflucan 150mg x 1 on 1/1. Monitor for recurrence of symptoms, may need additional diflucan dose.       Hypernatremia. Elevated from admission to 12/25, normalized 12/26. Thought 2/2 hypovolemia.      ARF. 2/2 CCB overdose. Cr peak 1.62. C/b volume overload and pulmonary edema, treated with IV lasix. Cr since normalized.       SVT. Presented with HR 190s in setting of critical illness following OD, resolved with IVF.       Paroxysmal a fib. Brief episode of a fib 12/20 with conversion to NSR with metoprolol x2. No further episodes. On coreg as above.      Normocytic anemia. Hgb 10.4 on admit. Drop to 5.8 12/19, s/p 1U PRBC. Smear with marked normochromic, normocytic anemia, very rare RBC fragments and spherocytes. Haptoglobin 63, . Concern for possible alveolar hemorrhage as above. Possibly 2/2 chronic Etoh use. Hgb now stable. Cont folic acid.       Coagulopathy. In the setting of etoh use, poor nutrition. S/p vitamin K. No s/s of acute bleeding. Normalized 12/27.      Hypothyroidism. TSH 5.06, T4 normal in setting of critical illness thus no dose  adjustments made. Continue PTA synthroid. Needs repeat TFTS 6 weeks from 12/13.       Incidental MRCP findings. Abnormal soft tissue posterior to the fundus of the stome, similar to 1/2017 CT. Needs follow up as outpatient for biopsy on discharge.       GI ppx. In setting of recent OD. Cont PPI qd.[CM1.1]     Lactic acidosis. Pt had lactic acid elev to 2.3 on 1/2 after triggering protocol for tachycardia. BC x 2 obtained, CXR obtained -stable with previous. Procalc low. Has known c.diff and resolving ARDS as above. Also patient with poor fluid intake. Repeat lactic acid today[CM1.2] showed 0.9 after no changes were made to plan. Only check if triggers protocol.[CM1.3]       Diet: regular diet  Fluids: none  DVT Prophylaxis: lovenox   Code Status: FULL    Disposition Plan   Expected discharge: pending; recommended to transitional care unit once placement established.   Information in the above section will display in the discharge planner report.      The patient's care was discussed with the Attending Physician, Dr. Lopez, Bedside Nurse, Care Coordinator/ and Patient.    Negin Hyde Stew  Internal Medicine Staff Hospitalist Service  [facility]  Pager: [phone]  Please see sticky note for cross cover information    Interval History[CM1.1]   Ana Laura is doing okay today. Reports that her bowel movements have improved over past 24 hours, less liquidy. Still having about 2 stools daily. No chest pain, sob, or dyspnea. No fevers or chills. Reports cough improving. No dysuria or ur[CM1.4]inary frequency. Denies SI.[CM1.5]     Data reviewed today: I reviewed all medications, new labs and imaging results over the last 24 hours. I personally reviewed labs, imaging, and vitals.     Physical Exam   /89 (BP Location: Right arm)  Pulse 109  Temp 97.9  F (36.6  C) (Oral)  Resp 18  Wt 49.6 kg (109 lb 6.4 oz)  SpO2 98%  BMI 19.38 kg/m2     GENERAL: Alert and oriented x 3.  NAD.[CM1.1] Appears deconditioned.[CM1.5]   HEENT: Anicteric sclera.[CM1.1] MMM.[CM1.5]   CV: RRR.  RESPIRATORY: Effort normal on[CM1.1] RA[CM1.5]. Lungs CTAB[CM1.1].[CM1.5]   GI: Abdomen soft and non distended with normoactive bowel sounds present in all quadrants. No tenderness, rebound, guarding.   NEUROLOGICAL: No focal deficits. Moves all extremities.    EXTREMITIES: No peripheral edema. Intact bilateral pedal pulses.   SKIN: No jaundice.[CM1.1] Ecchymoses on both cheeks, no open areas.[CM1.5]        Revision History        User Key Date/Time User Provider Type Action    > [N/A] 1/4/2018 11:23 AM Negin Mathias PA-C Physician Assistant Addend     CM1.5 1/3/2018 12:51 PM Negin Mathias PA-C Physician Assistant Sign     CM1.4 1/3/2018 12:29 PM Negin Mathias PA-C Physician Assistant      CM1.3 1/3/2018 12:28 PM Negin Mathias PA-C Physician Assistant      CM1.2 1/3/2018  8:13 AM Negin Matihas PA-C Physician Assistant      CM1.1 1/3/2018  7:59 AM Negin Mathias PA-C Physician Assistant             Progress Notes by Di Wylie LICSW at 1/3/2018 11:22 AM     Author:  Di Wylie LICSW Service:  Social Work Author Type:      Filed:  1/3/2018  6:29 PM Date of Service:  1/3/2018 11:22 AM Creation Time:  1/3/2018  6:22 PM    Status:  Signed :  Di Wylie LICSW ()         Social Work Services Progress Note    Hospital Day: 21  Date of Initial Social Work Evaluation:  12/14/2017  Collaborated with:  [facility] ARU/TCU Admissions, pt    Data:  SW following for placement. [facility] ARU/TCU continues to decline pt as pt's psychiatric needs unable to be met on the unit. Pt has been declined by [facility]. Did not hear back from [facility], but anticipate facility will not be able provide the care pt requires. After, discussion w/ pt, she is willing to broaden area for TCUs. Referral initiated to  [facility], through our hospital liaison.     Intervention:  D/c planning     Assessment:  Pt actively participated in d/c planning discussion. Writer had a jazmín conversation regarding barriers to placement and that we would need to look outside of her preferred area. Pt was very receptive to this information and in agreement that [facility].     Plan:    Anticipated Disposition:  TCU referrals out to [facility]    Barriers to d/c plan:  Current Mental Health Needs    Follow Up:  SW awaiting completion of assessment by [facility][NC1.1]         Revision History        User Key Date/Time User Provider Type Action    > NC1.1 1/3/2018  6:29 PM Di Wylie, LICSW  Sign            Progress Notes by Janelle Swain MD at 1/3/2018  4:59 PM     Author:  Janelle Swain MD Service:  Gastroenterology Author Type:  Fellow    Filed:  1/3/2018  5:11 PM Date of Service:  1/3/2018  4:59 PM Creation Time:  1/3/2018  4:59 PM    Status:  Signed :  Janelle Swain MD (Fellow)         GI Progress Note  01/03/18    Subjective:  No acute events overnight. Abdominal cramping and bowel movements continue to improve. 1-2 bowel movements yesterday. Tolerating diet. No nausea, vomiting. No chest pain or shortness of breath. Overall feels she is improving.    Objective:[AL1.1]  /78 (BP Location: Right arm)  Pulse 109  Temp 98.7  F (37.1  C) (Oral)  Resp 18  Wt 47.3 kg (104 lb 4.8 oz)  SpO2 97%  BMI 18.48 kg/m2[AL1.2]  General: Alert, NAD  HEENT: NC/AT  CV: Tachycardic, regular rhythm  Resp: CTAB  Abd: Diffuse mild TTP, + BS, no guarding or rebound, non-distended  Ext: WWP  Neuro: Alert and oriented    Impression/Recommendation:  Ana Laura Wharton is a 47 year old female with past medical history of hypertension, anxiety, refractory bipolar disorder with multiple suicide attempts, hypothyroidism and recurrent CDI who was admitted  after intentional overdose with amlodipine, NyQuil, acetaminophen and vodka. Her hospital course has been complicated by prolonged intubation with pneumonia, treatment with NAC for acetaminophen overdose/elevated liver tests and recurrent CDI after antibiotic administration (cefepime, vancomycin, ceftriaxone and metronidazole). She has had recurrent C diff typically provoked with antibiotics. Bowel movements decreasing and abdominal pain improving with vancomycin.     C Diff History:  - 8/2015: FMT  - 4/2016: Had UTI, given abx, developed C diff and treated with oral vancomycin for 4 months  - 5/2016: C diff negative  - 6/2016: C diff positive  - 8/8/2016: FMT.  Developed UTI and treated with antibiotics within a week of receiving FMT  - 8/13/2016: C diff positive  - 8/21/2016: C diff positive  - 9/2016: Given 10 day course of fidoxamicin with improvement in symptoms  - 10/11/2016: C diff negative  - 1/2017: Had cystits, given abx with vancomycin ppx  - 3/9/2017: C diff positive (was admitted for suicide attempt and had been off medications for weeks to months).  Treatment started with vancomycin taper  - 3/23/2017: With persistent diarrhea, switched vanc taper to fidoxamicin  - 12/13/2017: Current admission, per report had formed stool on admission  - 12/14/2017: Started cefepime and vancomycin IV  - 12/17/2017: Switched to ceftriaxone and flagyl for aspiration PNA  - 12/22/2017: C diff positive, started on oral vancomycin, in addition to flagyl, cefepime, and vanc IV  - 12/24/2017: Cefepime and vanc IV stopped, vanc PO and flagyl continued  - 12/26/2017: Flagyl stopped  - 12/30/2017: TMP/SMX x 2 days; stopped     RECOMMENDATIONS:   - Vancomycin taper as follows: 125 mg QID x 14 days; 125 mg TID x 7 days; 125 mg BID x 7 days; 125 mg daily x 7 days, 125 mg every other day x 7 days. If she requires repeat antibiotics she should notify GI clinic immediately for discussion of vancomycin dosing.    GI will sign-off.  Follow-up with Dr. Dennis non-urgently as outpatient (we will arrange). Please feel free to contact our service with any further questions or concerns regarding the care of this patient.     Plan of care discussed with Dr. Carlin.     Janelle Swain MD  Gastroenterology Fellow[AL1.1]     Revision History        User Key Date/Time User Provider Type Action    > AL1.2 1/3/2018  5:11 PM Janelle Swain MD Fellow Sign     AL1.1 1/3/2018  4:59 PM Janelle Swain MD Fellow             Progress Notes by Fransico Adam APRN CNP at 1/2/2018  9:15 PM     Author:  Fransico Adam APRN CNP Service:  Internal Medicine Author Type:  Nurse Practitioner    Filed:  1/2/2018  9:17 PM Date of Service:  1/2/2018  9:15 PM Creation Time:  1/2/2018  9:15 PM    Status:  Signed :  Fransico Adam APRN CNP (Nurse Practitioner)         [facility]    Sepsis Evaluation Progress Note    Date of Service: 01/02/2018    I was called to see Ana Laura Wharton due to abnormal vital signs triggering the Sepsis SIRS screening alert. She is known to have an infection.     Physical Exam    Vital Signs:  Temp: 97.9  F (36.6  C) Temp src: Oral BP: 158/90 Pulse: 100 Heart Rate: 89 Resp: 18 SpO2: 98 % O2 Device: None (Room air)      Lab:  Lactic Acid   Date Value Ref Range Status   01/02/2018 2.3 (H) 0.7 - 2.0 mmol/L Final       The patient is at baseline mental status.    The rest of their physical exam is significant for a cough.    Assessment and Plan    The cause of her elevated lactic acid is unclear.  She is being treated for C diff and was recently treated for a UTI.    Disposition: The patient will remain on the current unit. We will continue to monitor this patient closely.    - BC x2  - CXR now  - Procalcitonin now  - Will hold off on additional antibiotics pending studies    AKILAH Panchal CNP[NC1.1]       Revision History        User Key Date/Time User  Provider Type Action    > NC1.1 1/2/2018  9:17 PM Fransico Adam APRN CNP Nurse Practitioner Sign            Progress Notes by Janelle Swain MD at 1/2/2018  5:14 PM     Author:  Janelle Swain MD Service:  Gastroenterology Author Type:  Fellow    Filed:  1/2/2018  5:43 PM Date of Service:  1/2/2018  5:14 PM Creation Time:  1/2/2018  5:14 PM    Status:  Attested :  Janelle Swain MD (Fellow)    Cosigner:  Aleah Carlin MD at 1/2/2018  5:52 PM        Attestation signed by Aleah Carlin MD at 1/2/2018  5:52 PM        Attestation:  I performed a history and physical examination of the above patient and discussed the management with the GI Fellow, Dr. Wiliam Villa on 1/2/2018. I reviewed the note and there are no changes. Please see the HPI for pertinent positives and negatives. I agree with the documented findings and plan of care as outlined.    Aleah Carlin MD  Gastroenterology - Luminal Service                                   GASTROENTEROLOGY PROGRESS NOTE  Date of Service:[AL1.1] 01/02/2018[AL1.2]    ASSESSMENT:  Ana Laura Wharton is a 47 year old female with past medical history of hypertension, anxiety, refractory bipolar disorder with multiple suicide attempts, hypothyroidism and recurrent CDI who was admitted after intentional overdose with amlodipine, NyQuil, acetaminophen and vodka. Her hospital course has been complicated by prolonged intubation with pneumonia, treatment with NAC for acetaminophen overdose/elevated liver tests and recurrent CDI after antibiotic administration (cefepime, vancomycin, ceftriaxone and metronidazole). She has had recurrent C diff typically provoked with antibiotics. Bowel movements decreasing with vancomycin.    C Diff History:  - 8/2015: FMT  - 4/2016: Had UTI, given abx, developed C diff and treated with oral vancomycin for 4 months  - 5/2016: C diff negative  - 6/2016: C diff positive  -  8/8/2016: FMT.  Developed UTI and treated with antibiotics within a week of receiving FMT  - 8/13/2016: C diff positive  - 8/21/2016: C diff positive  - 9/2016: Given 10 day course of fidoxamicin with improvement in symptoms  - 10/11/2016: C diff negative  - 1/2017: Had cystits, given abx with vancomycin ppx  - 3/9/2017: C diff positive (was admitted for suicide attempt and had been off medications for weeks to months).  Treatment started with vancomycin taper  - 3/23/2017: With persistent diarrhea, switched vanc taper to fidoxamicin  - 12/13/2017: Current admission, per report had formed stool on admission  - 12/14/2017: Started cefepime and vancomycin IV  - 12/17/2017: Switched to ceftriaxone and flagyl for aspiration PNA  - 12/22/2017: C diff positive, started on oral vancomycin, in addition to flagyl, cefepime, and vanc IV  - 12/24/2017: Cefepime and vanc IV stopped, vanc PO and flagyl continued  - 12/26/2017: Flagyl stopped  - 12/30/2017: TMP/SMX x 2 days; stopped    RECOMMENDATIONS:   - Continue vancomycin 125 mg QID   - Monitoring of stool counts   - GI will continue to follow along with you    The patient was discussed and plan agreed upon with GI staff, Dr. Carlin.    Janelle Swain MD  GI Consult Service  _______________________________________________________________  S: No acute events overnight. Patient reports two bowel movements daily - liquid. Overall abdominal cramping and bowel movements improved, but wonders if she needs fidoxamicin. No nausea, vomiting, chest pain, shortness of breath.     O:[AL1.1]  Blood pressure 129/78, pulse 100, temperature 96.9  F (36.1  C), temperature source Oral, resp. rate 15, weight 49.6 kg (109 lb 6.4 oz), SpO2 97 %.[AL1.2]    Gen: Alert, NAD  HEENT: NC/AT  CV: RRR  Lungs: No increased WOB  Abd: Soft, NT, ND, + BS  Skin: No rash  MS: No edema  Neuro: Alert and oriented; no focal deficits      LABS:  BMP[AL1.1]  Recent Labs  Lab 01/02/18  0835 01/01/18  0617  12/31/17  0830 12/30/17  1006 12/29/17  0317    137 138  --  138   POTASSIUM 4.4 3.8 4.1 3.8 4.1   CHLORIDE 101 102 105  --  104   ISAURO 9.4 9.2 8.8  --  8.4*   CO2 26 24 28  --  27   BUN 15 16 18  --  16   CR 0.65 0.66 0.55  --  0.63   * 149* 109*  --  90[AL1.2]     CBC[AL1.1]  Recent Labs  Lab 01/02/18  0835 01/01/18  0617 12/31/17  0830 12/29/17 0317   WBC 13.0* 13.6* 14.6* 17.2*   RBC 2.85* 2.86* 2.85* 2.69*   HGB 9.1* 9.2* 9.2* 8.5*   HCT 29.8* 30.0* 29.7* 27.3*   * 105* 104* 102*   MCH 31.9 32.2 32.3 31.6   MCHC 30.5* 30.7* 31.0* 31.1*   RDW 18.6* 18.7* 18.8* 19.8*    373 412 358[AL1.2]     INR[AL1.1]  Recent Labs  Lab 01/01/18  0617 12/27/17  2103   INR 1.07 1.09[AL1.2]     LFTs[AL1.1]  Recent Labs  Lab 01/02/18  0835 01/01/18  0617 12/31/17  0830 12/29/17 0317   ALKPHOS 586* 648* 771* 930*   AST 48* 61* 75* 106*   ALT 51* 65* 82* 126*   BILITOTAL 1.6* 1.5* 1.5* 2.1*   PROTTOTAL 6.7* 6.5* 6.4* 5.7*   ALBUMIN 2.6* 2.6* 2.5* 2.2*[AL1.2]      PANC[AL1.1]No lab results found in last 7 days.[AL1.2]     Revision History        User Key Date/Time User Provider Type Action    > AL1.2 1/2/2018  5:43 PM Janelle Swain MD Fellow Sign     AL1.1 1/2/2018  5:14 PM Janelle Swain MD Fellow             Progress Notes by Negin Mathias PA-C at 1/2/2018 10:59 AM     Author:  Negin Mathias PA-C Service:  Internal Medicine Author Type:  Physician Assistant    Filed:  1/2/2018  1:25 PM Date of Service:  1/2/2018 10:59 AM Creation Time:  1/2/2018 10:59 AM    Status:  Attested :  Negin Mathias PA-C (Physician Assistant)    Cosigner:  Kerry Lopez MD at 1/2/2018  3:58 PM        Attestation signed by Kerry Lopez MD at 1/2/2018  3:58 PM        Attestation:  Physician Attestation   Kerry CRUZ, saw and evaluated Ana Laura Wharton as part of a shared visit.  I have reviewed and discussed with the advanced practice provider their history, physical and  plan.    I personally reviewed the vital signs, medications, labs and imaging.    My key history or physical exam findings: Patient is doing okay. Oral intake is improving.  Had peanut butter jelly sandwich for lunch.  Reports her dysuria and urgency is improving as well.  Her vital signs stable. She is not in distress.    Key management decisions made by me: Obtain repeat psych consult for dispo planning.  Stop antibiotics for UTI given improvement of symptoms and unremarkable urine culture results.    Esra Sit  Date of Service (when I saw the patient): 1/2/18                               [facility] [place]  Internal Medicine Progress Note - [facility] Service    Patient: Ana Laura Wharton  MRN: [id number]  Admission Date: 12/13/2017  Hospital Day # 20    Assessment & Plan   Ana Laura Wharton is a 47 year old female with history of HTN, bipolar disorder, anxiety, depression with multiple suicide attempts, 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 ICU course c/b lactic acidosis, acute hypoxic/hypercarbic respiratory failure 2/2 aspiration requiring intubation c/b ARDS, ARF, severe hypotension/vasogenic shock requiring pressors, paroxysmal A.fib, intermittent SVT, and sepsis 2/2 sinusitis. Patient extubated 12/24/17, transferred to medicine 12/25 for ongoing care.       Intentional overdose 12/13 with bipolar disorder, depression, anxiety, etoh abuse, possible benzodiazapine w/d: Overdose with amlodipine, Tylenol, Nyquil and Etoh. PTA drinking 16 oz vodka daily for 1.5 days. Treated with Ca gtt, high dose insulin, and NAC per poison control. Extubated 12/24. Sitter discontinued 12/28 per psych recs. Denies active SI, but given significant psych history, will likely benefit from inpatient psychiatric care. Unfortunately, patient has c.diff and not  independent of own cares d/t weakness and deconditioning at this time, so not appropriate for inpatient psych on [facility] currently. Will need stay at rehab before potential transfer to inpatient psych, ideally rehab on [facility] so that psychiatry team can continue to evaluate and help with dispo following stay[CM1.1] or med/psych unit.[CM1.2]   - Continue scheduled remeron 15mg qhs and olanzapine 5mg BID  -[CM1.1] Decrease[CM1.3] prn ativan[CM1.1] to[CM1.3] 1-2mg q[CM1.1]6[CM1.3]h prn[CM1.1], then to q8h prn in 3 days[CM1.3]   - Suicide precautions  - Daily thiamine, folic acid, MVI  - If patient attempts to leave, will need to place 72 hr hold  -[CM1.1] Psychiatry to re-evaluate today (consult placed)[CM1.2]      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 C.diff +12/22. AXR 12/28 no acute findings. Currently on po vancomycin with slow improvement in stooling, unfortunately had to start abx for UTI as below. Ha[CM1.1]s had decreasing stools while on vancomycin, 2 loose stools daily over past few days.[CM1.2]  - GI following, plan to cont vanco po 125mg QID[CM1.1] - will need long taper[CM1.2]  - Consider colonoscopy with bx if not getting better  - Obtain KUB XR if acute worsening to eval for toxic megacolon     [CM1.1]  D[CM1.3]ysuria. Patient reports dysuria, increased urinary frequency overnight 12/30. UA with WBC, +LE, neg nitrate. UCx prelim shows candida, also spoke with lab and a gram positive cocci species[CM1.1] is growing -[CM1.4] they then retrated this and resulted the culture as mixed urogenital mina.[CM1.3] Started macrobid 12/30.   -[CM1.1] Stop macrobid[CM1.3]  - Cont pyridium through today to complete 3d    Vaginal candidiasis. Wet prep pos for yeast 1/1/18. No trich or BV. S/p diflucan 150mg x 1 on 1/1.[CM1.4]       Acute pancreatitis, transaminitis. Lipase 1775 12/26. Previously normal. US no stones. MRCP normal 12/27. ALP improving[CM1.1] 586  ([CM1.4]648[CM1.1])[CM1.4], Tbili stable[CM1.1] ~[CM1.4]1.5. ALT, AST stable. Previously had diffuse abdominal tenderness, this resolved on 12/29. GI following, transaminitis thought to be 2/2 cholestasis of sepsis and critical illness. Tolerating diet as of 12/29.  - GI following  - Regular diet as tolerated, give IVF if acutely decompensates  - CMP[CM1.1] qMWF[CM1.4]      ARDS 2/2 aspiration, acute hypoxic/hypercarbic respiratory failure. Initially d/t overdose, V/Q mismatch in setting of ARDS. S/p intubation (initially prone) flolan, solumedrol, diuretics, and Duobneb. Extubated 12/24. Completed steroids 12/28. VSS. Has no required supplemental oxygen since 12/29. Repeat CXR 1/1/18 shows diffuse patchy pulmonary opacities, stable to slightly increased with pleural effusions slightly increased. Ongoing dry cough, which will likely persist for weeks.[CM1.1] Reports improvement in coug[CM1.5]h over past 24hr with use of IS and acapella[CM1.4].[CM1.5]  No new fevers or hypoxia  - Continue aggressive pulm toilet with acapella and IS  - Tessalon pearls for cough  - PRN duonebs for wheezing      Severe malnutrition. Prealbumin 25 12/28. Very limited PO intake, patient reports due to high volume BM. Would like to avoid [place] at all lengths. PO intake starting to improve after long discussion about TFs on 12/29. Nutrition following and calorie counts improved. Cont regular diet. Appreciate nutrition following. No indication for TF at this time.      HTN. PTA on Norvasc. Currently on coreg 6.25mg BID and clonidine 0.1g TID. BPs stable on current regimen. Cont to monitor and adjust as needed.       Pressure injury. Not present on admission. Bilateral cheek and left forearm. WOCN following, last saw today 1/2 with recs to wash BID with saline and gauze and gently rub vaseline on cheek wounds, and was left forearmwith recs to cleanse with saline solution[CM1.1]. C[CM1.4]ontinue off loading measures.      Deconditioning. In  setting of prolonged hospitalization and recent critical illness. Working with PT/OT. PT/OT currently recommending TCU. Will consult PMR for possible ARU placement. Dispo as outlined above.       Resolved Hospital Problems and Stable, Chronic Medical Conditions:  Altered mental status. Resolved. Was having waxing and waning mental clarity, which was likely multifactorial including: rapid cycling bipolar with psychotic features, ?drug induced psychosis, ?ICU delirium, ?ongoing infection with c.diff. Has been A&O x 3 over past 72 hours without hallucinations, agitation, or inappropriate comments. Monitor closely.      Likely acute opiate withdrawal. Signs and symptoms of opiate withdrawal noted 12/27, likely 2/2 high fentanyl dosing during intubation (chills, myalgia, diarrhea, diaphoresis, abdominal pain). Although several symptoms can be connected to other etiology (ex. Diarrhea and c diff, abdominal pain and pancreatitis). Started on scheduled opiates 12/27 with improvement, tapered off completely 12/31. No further s/sx of withdrawal. Cont to monitor for s/sx of withdrawal.      Hypernatremia. Elevated from admission to 12/25, normalized 12/26. Thought 2/2 hypovolemia.     ARF. 2/2 CCB overdose. Cr peak 1.62. C/b volume overload and pulmonary edema, treated with IV lasix. Cr since normalized.      SVT. Presented with HR 190s in setting of critical illness following OD, resolved with IVF.      Paroxysmal a fib. Brief episode of a fib 12/20 with conversion to NSR with metoprolol x2. No further episodes. On coreg as above.     Normocytic anemia. Hgb 10.4 on admit. Drop to 5.8 12/19, s/p 1U PRBC. Smear with marked normochromic, normocytic anemia, very rare RBC fragments and spherocytes. Haptoglobin 63, . Concern for possible alveolar hemorrhage as above. Possibly 2/2 chronic Etoh use. Hgb now stable. Cont folic acid.      Coagulopathy. In the setting of etoh use, poor nutrition. S/p vitamin K. No s/s of acute  bleeding. Normalized 12/27.     Hypothyroidism. TSH 5.06, T4 normal in setting of critical illness thus no dose adjustments made. Continue PTA synthroid. Needs repeat TFTS 6 weeks from 12/13.      Incidental MRCP findings. Abnormal soft tissue posterior to the fundus of the stome, similar to 1/2017 CT. Needs follow up as outpatient for biopsy on discharge.      GI ppx. In setting of recent OD. Cont PPI qd.       Diet: regular diet  Fluids: none  DVT Prophylaxis: lovenox   Code Status: FULL    Disposition Plan   Expected discharge:[CM1.1] pending[CM1.4]; recommended to[CM1.1] ARU vs TCU vs med/psych unit[CM1.4] once[CM1.1] dispo plan established[CM1.4].   Information in the above section will display in the discharge planner report.      The patient's care was discussed with the[CM1.1] Attending Physician, Dr. Lopez, Bedside Nurse, Care Coordinator/ and Patient[CM1.4].    Negin Hyde [facility]  Internal Medicine Staff Hospitalist Service  [facility]  Pager: [phone]  Please see sticky note for cross cover information    Interval History[CM1.1]   Ana Laura is doing ok today. She reports her cough improving. Continues to have some dysuria. Denies any chest pain, sob, or dyspnea. Reports cough improving. Continues to have 1-2 loose stools daily. Tolerating more po intake. Denies active SI.[CM1.4]     Data reviewed today: I reviewed all medications, new labs and imaging results over the last 24 hours. I personally reviewed labs, imaging, and vitals.     Physical Exam   /71 (BP Location: Right arm)  Pulse 100  Temp 97.6  F (36.4  C) (Oral)  Resp 16  Wt 49.6 kg (109 lb 6.4 oz)  SpO2 98%  BMI 19.38 kg/m2     GENERAL: A[CM1.1]&O x 3. NAD. Resting in bed. Appears deconditioned.[CM1.4]  HEENT: Anicteric sclera. M[CM1.1]MM.[CM1.4]   CV: RRR.   RESPIRATORY: Effort normal on[CM1.1] RA[CM1.4]. Lungs CTAB[CM1.1] - no wheezing, distant sounds in bases.[CM1.4]   GI: Abdomen soft and non  distended with normoactive bowel sounds present in all quadrants. No tenderness, rebound, guarding.   NEUROLOGICAL: No focal deficits. Moves all extremities.    EXTREMITIES: No peripheral edema. Intact bilateral pedal pulses.   SKIN: No jaundice. No rashes.[CM1.1] Ecchymoses on both cheeks, no open areas.[CM1.4]        Revision History        User Key Date/Time User Provider Type Action    > CM1.3 1/2/2018  1:25 PM Negin Mathias PA-C Physician Assistant Sign     CM1.4 1/2/2018 11:49 AM Negin Mathias PA-C Physician Assistant Sign     CM1.2 1/2/2018 11:07 AM Negin Mathias PA-C Physician Assistant      CM1.5 1/2/2018 11:01 AM Negin Mathias PA-C Physician Assistant      CM1.1 1/2/2018 10:59 AM Negin Mathias PA-C Physician Assistant             Progress Notes by Pearl Cardozo LSW at 1/2/2018 11:05 AM     Author:  Pearl Cardozo LSW Service:  Social Work Author Type:      Filed:  1/2/2018  3:02 PM Date of Service:  1/2/2018 11:05 AM Creation Time:  1/2/2018 11:05 AM    Status:  Signed :  Pearl Cardozo LSW ()         Call made to intake at  ARU/TCU. Patient has been declined but will review her.     Call placed to [facility] psych to see if patient would be able to DC to [facility] Psych medicine unit. No beds available at [facility] with no anticipated DC's.[RB1.1]     Social Work Services Progress Note  Hospital Day: 20  Date of Initial Social Work Evaluation:  12/14/17  Collaborated with:  HAMIDA Kam, Lead [facility],  TCU     Data:  Per Chart review, FELICIA continues to follow patient for DC planning.     Intervention:  FELICIA received report from HAMIDA Kam who notified writer that patient is ready to DC. Per chart review Psychiatry had been recommending that patient d/t to inpatient psych after TCU stay. D/t patient being weak, SW made referral to [facility] Psychiatry medicine unit. SW spoke with  Dionicio in intake who reported after review, patient would not be appropriate for Psychiatry d/t not being suicidal. Dionicio did recommend [facility] on Psych as an alternative. However per chart review, DC recommendation has now changed upon most recent Psychiatry evaluation with patient. Please see most recent note from psychiatry dated 18 that states patient no longer needs to DC to inpatient psych and instead the recommendation would be DC to a TCU and then an intensive day treatment program. Per chart review, pt appears to be in agreement with this DC plan.    Met with patient at bedside and patient stated she is not interested in meeting with a financial counselor at this time because she believes she would not qualify for MA. Patient stated she previously has applied for this. Patient reported that she believes her [employer] insurance is going to remain active through her husbands insurer. SW spoke with patient about DC planning and psychiatry rec. Patient is in agreement with plan to DC to a TCU and then to do an intensive day treatment program. SW notified patient that [facility] has declined patient previously but reviewing patient with new psych recs made today. Patient requested referral also be sent to [facility] and [facility], as both facilities are close to patients home. SW did send referrals to both facilities.     Referrals [place]. [facility]: Declined patient but new nanci recommendations made and are reviewing referral.   2. [facility]: Referral sent  3. [facility] Referral sent        Assessment:  Pt's worries addressed though pt appears intent on discharging home.  Pt may appear to lack insight into her deficits as she again tells writer she has no one that can help her at home.  Pt pleasant though has a flat affect.     Plan:    Anticipated Disposition:  TCU then intensive day treatment program    Barriers to d/c plan:  hx. Of  S.I./MH/CD     Follow Up:  SW will continue to follow.     MARGARITA Fan  [address] coverage   Phone: [phone]  Pager: [phone]     [RB1.2]     Revision History        User Key Date/Time User Provider Type Action    > RB1.2 1/2/2018  3:02 PM Pearl Cardozo LSW  Sign     RB1.1 1/2/2018 11:05 AM Pearl Cardozo LSW              Progress Notes by Elif Gregorio, RN at 1/2/2018 10:18 AM     Author:  Elif Gregorio, RN Service:  [facility] Nurse Author Type:  Registered Nurse    Filed:  1/2/2018 11:12 AM Date of Service:  1/2/2018 10:18 AM Creation Time:  1/2/2018 10:19 AM    Status:  Addendum :  Elif Gregorio RN (Registered Nurse)         [facility] Nurse Inpatient Adult Pressure InjuryAssessment    Followup Assessment  Reason for consultation: Evaluate and treat multiple pressure injuries    Assessment:   Left cheek wound due to Pressure Injury  Pressure Injury appears to be a deep tissue injury that is now improving. Present on admission No  This is a Medical Device Related Pressure Injury (MDRPI) due to ETT securement device    Right cheek wound due to Pressure Injury  Resolved  This is a Medical Device Related Pressure Injury (MDRPI) due to ETT securement device    Left proximal forearm wound due to Pressure Injury  Pressure Injury is unstageable  Present on admission No  This is a Medical Device Related Pressure Injury (MDRPI) due to IV hub while proning    Left distal forearm wound due to Pressure Injury  Pressure Injury is Stage 2. Present on admission No  This is a Medical Device Related Pressure Injury (MDRPI) due to IV cap.       Contributing factor of the pressure injury: immobility, proning (no longer being proned)    Photo: see individual wound assessments below    TREATMENT  PLAN    Bilateral cheek wounds: Wash twice daily with saline and gauze. Gently rub Vaseline onto wounds to keep moist.   Left forearm (at AC): wash every other day with wound cleanser and  gauze, dry thoroughly. Cover with thin hydrocolloid dressing (Comfeel) that has been cut to fit.  Change every 3 days and as needed     Orders Reviewed  WOC Nurse follow-up plan:twice weekly  Nursing to notify the Provider(s) and re-consult the WOC Nurse if wound(s) deteriorates or new skin concern.    Patient History  According to provider note(s):    Ana Laura Wharton is a 46 yo female with hx of bipolar disorder with depression, polysubstance abuse, & prior suicide attempts, admitted to [facility] MICU  Following calcium channel blocker (CCB) overdose, tylenol overdose & alcohol overdose. Course complicated by lactic acidosis, hypoventilation, acute renal failure, & severe hypotension/vasogenic shock. Currently intubated, sedated, & proned.      Patient proned for lung recruitment last from 17 at 1315 to 17 at 0520 (per nursing notes). On 17 ET tabs changed and pressure injury noted under old tabs. WOC consult placed. Left forearm pressure injury noted under IV during WOC skin assessment.    Objective Data     Current Diet/ Nutrition:[PR1.1]    Active Diet Order      Regular Diet Adult[PR1.2]    Output:[PR1.1]   I/O last 3 completed shifts:  In: 480 [P.O.:480]  Out: 1700 [Urine:1700][PR1.2]    Skin Assessment: Sujit[PR1.1] Sujit Score  Av.1  Min: 8  Max: 20[PR1.2]                               Labs:[PR1.1]   Recent Labs  Lab 18  0835 18  0617  17  0317   HGB 9.1* 9.2*  < > 8.5*   INR  --  1.07  --   --    WBC 13.0* 13.6*  < > 17.2*   CRP  --   --   --  14.0*   < > = values in this interval not displayed.[PR1.2]    Physical Exam    Skin assessment:   Focused skin inspection: anterior body    Wound Location:  Left cheek  17 Left cheek18         Wound History: see history above.  Measurements (length x width x depth, in cm)[PR1.1] 1.5[PR1.3] cm x[PR1.1] 2.2[PR1.3] cm  x  0 cm   Wound Base:[PR1.1] fading maroon and deep purple under[PR1.3] loose layer of devitalized  epithelium[PR1.1]. Wound edges diffuse[PR1.3]  Palpation of the wound bed: firm   Periwound skin: intact  Color: normal and consistent with surrounding tissue  Temperature: normal   Drainage: none  Description of drainage: none  Odor: none  Pain:none       Wound Location:  Right cheek    resolved      Wound Location:  Left proximal forearm            left forearm  Wound History: Noted during skin assessment. Wound was found under hub of peripheral IV site  Measurements (length x width x depth, in cm) 1.7 cm x 0.5 cm  x  0.4 cm   Wound Base:  100% Black leathery eschar  Palpation of the wound bed: induration  Periwound skin: up to 2 cm of pink erythema   Temperature: normal   Drainage: none  Description of drainage: none  Odor: none  Pain:none    Wound Location:  Left distal forearm  (see picture above)  Wound History: Noted during skin assessment. Wound was found under hub of peripheral IV site  Measurements (length x width x depth, in cm) 0.5 cm x 0.4 cm  x  0.01 cm   Wound Base: resurfaced epidermis  Palpation of the wound bed: normal   Periwound skin: intact  Color: normal and consistent with surrounding tissue  Temperature: normal   Drainage: none  Description of drainage: none  Odor: none  Pain: none      Interventions  Current support surface: Standard  Atmosair    Current off-loading measures: Pillows under calves  Visual inspection of wound(s) completed  Wound Care:was done per plan of care    Cleansing with saline solution  Supplies: Placed at Bedside  Pt education on wound treatment plan, healing   Discussed plan of care with Nurse[PR1.1]             Revision History        User Key Date/Time User Provider Type Action    > PR1.3 1/2/2018 11:12 AM Elif Gregorio RN Registered Nurse Addend     PR1.2 1/2/2018 10:27 AM Elif Gregorio, RN Registered Nurse Sign     PR1.1 1/2/2018 10:18 AM Elif Gregorio RN Registered Nurse                   Procedure Notes     No notes of this type exist for this  encounter.      Progress Notes - Therapies (Notes from 01/02/18 through 01/05/18)     No notes of this type exist for this encounter.

## 2017-12-13 NOTE — ED PROVIDER NOTES
History     Chief Complaint:  Drug Overdose    HPI   The patient's history was somewhat limited secondary to decreased responsiveness from a drug overdose.    Ana Laura Wharton is a 47 year old female with a history of suicidal ideation, bipolar disease, hypertension, and hyperlipidemia who presents to the ED via EMS for evaluation of a drug overdose. EMS reports that the patient called her friend in [place] this morning, and during the conversation, she stopped responding and EMS was called. When EMS located her apartment, she was able to stand and move but was altered and groggy. There were 2 empty bottles of amlodipine near her, a 1/4 filled bottle of Nyquil, and an empty 75 mL bottle of vodka in her kitchen. EMS notes a heart rate of 110, with a blood pressure of 77/50, a blood sugar of 141 and dilated pupils en route. She opened her eyes to a sternal rub by EMS. Her friend notes that she has been having some comments recently about not wanting to live. Of note, her  says he has been in the middle of a divorce with her, but he has not wanted to leave her with her mental instability.    Allergies:  Amoxicillin  Azithromycin  Cephalexin  Compazine  Metoclopramide  Minocycline  Penicillins  Reglan  Restoril  Thorazine  Abilify  Lamotrigine  Sulfa drugs    Medications:    Remeron  Zyprexa  Klonopin  Atarax  Percocet  Vancocin  Relpax  Hydroxyzine  Synthroid  Estradiol  Kenalog  Zofran  Cleocin  Tizanidine  Protonix  Toradol    Past Medical History:    Anxiety  Bipolar disorder  Orofacial dyskinesia  C. difficile   Depression  HTN  Hyperlipidemia  GERD  Hypothyroidism  Migraines  Pneumothorax  Suicide attempt    Past Surgical History:    Knee arthroscopy  Cervix surgery  Left shoulder surgery  Thoracic surgery    Family History:    Depression  Hyperlipidemia  Macular degeneration    Social History:  Marital Status:   [2]  Current smoker  Negative for alcohol use.    Review of Systems   Neurological:         Positive for decreased responsiveness   Psychiatric/Behavioral: Positive for confusion.     Physical Exam   First Vitals:  Patient Vitals for the past 24 hrs:   BP Temp Temp src Pulse Heart Rate Resp SpO2 Height Weight   12/13/17 1724 104/56 - - 90 - - 100 % - -   12/13/17 1715 104/46 - - - - - - - -   12/13/17 1700 100/54 97.9  F (36.6  C) - - - 15 100 % - -   12/13/17 1645 109/54 97.5  F (36.4  C) - - - 22 100 % - -   12/13/17 1630 91/46 97.7  F (36.5  C) - - - - - - -   12/13/17 1615 93/51 - - - 99 20 100 % - -   12/13/17 1600 100/53 96.6  F (35.9  C) - - 103 (!) 31 92 % - -   12/13/17 1545 101/52 96.3  F (35.7  C) - - 107 (!) 39 99 % - -   12/13/17 1539 - - - - - - 99 % - -   12/13/17 1530 96/53 95.9  F (35.5  C) - - 97 22 100 % - -   12/13/17 1515 92/48 95.7  F (35.4  C) - - 89 20 100 % - -   12/13/17 1505 101/48 95.7  F (35.4  C) - - 99 17 100 % - -   12/13/17 1500 108/48 - - - 94 16 99 % - -   12/13/17 1455 (!) 89/47 - - - 96 15 99 % - -   12/13/17 1453 - 95.5  F (35.3  C) - - 98 16 100 % - -   12/13/17 1452 92/49 - - - 93 17 100 % - -   12/13/17 1451 - 95.5  F (35.3  C) - - 94 16 100 % - -   12/13/17 1450 (!) 86/47 95.2  F (35.1  C) - - 92 17 93 % - -   12/13/17 1445 - 95.5  F (35.3  C) - - 101 16 100 % - -   12/13/17 1444 96/48 95.5  F (35.3  C) - - 97 17 100 % - -   12/13/17 1443 - - - - 94 16 100 % - -   12/13/17 1442 - 95.5  F (35.3  C) - - - - - - -   12/13/17 1441 - 95.5  F (35.3  C) - - 92 16 100 % - -   12/13/17 1440 (!) 89/48 95.5  F (35.3  C) - - 91 15 100 % - -   12/13/17 1430 (!) 89/53 - - - 96 15 100 % - -   12/13/17 1415 90/49 - - - 98 15 100 % - -   12/13/17 1400 95/47 - - - 96 15 100 % - -   12/13/17 1359 - - - - 93 15 100 % - -   12/13/17 1358 - - - - 100 16 100 % - -   12/13/17 1357 - - - - 98 16 100 % - -   12/13/17 1356 - - - - 102 15 100 % - -   12/13/17 1355 104/53 - - - 105 16 100 % - -   12/13/17 1354 - - - - 102 19 100 % - -   12/13/17 1353 98/53 - - - 98 16 100 % - -   12/13/17  "1352 - - - - 95 16 100 % - -   12/13/17 1351 - - - - 96 15 100 % - -   12/13/17 1350 94/51 - - - 92 16 100 % - -   12/13/17 1345 104/53 - - - 101 19 100 % - -   12/13/17 1343 - - - - 98 16 100 % - -   12/13/17 1342 105/59 - - - 98 13 100 % - -   12/13/17 1341 - - - - 102 14 100 % - -   12/13/17 1340 101/63 - - - 101 14 100 % - -   12/13/17 1335 99/58 - - - 99 14 100 % - -   12/13/17 1330 102/53 - - - 96 - - - -   12/13/17 1325 (!) 87/53 - - - 94 13 - - -   12/13/17 1322 - - - - - - - 1.6 m (5' 3\") 52.2 kg (115 lb)   12/13/17 1320 (!) 85/53 - - - 91 14 100 % - -   12/13/17 1249 - 96.6  F (35.9  C) - - - - 100 % - -   12/13/17 1248 91/52 96.6  F (35.9  C) - - - - 100 % - -   12/13/17 1247 - 96.6  F (35.9  C) - - - - 100 % - -   12/13/17 1246 94/53 - - - - - 100 % - -   12/13/17 1245 - 96.8  F (36  C) - - - - 100 % - -   12/13/17 1244 (!) 89/53 96.6  F (35.9  C) - - - - 100 % - -   12/13/17 1243 - 96.6  F (35.9  C) - - - - 100 % - -   12/13/17 1242 - - - - - - 99 % - -   12/13/17 1241 - 96.6  F (35.9  C) - - - - 99 % - -   12/13/17 1240 98/57 96.8  F (36  C) - - - - 99 % - -   12/13/17 1239 - - - - 96 - 99 % - -   12/13/17 1238 - 96.8  F (36  C) - - 100 14 100 % - -   12/13/17 1237 - 96.8  F (36  C) - - - 28 100 % - -   12/13/17 1236 90/52 96.8  F (36  C) - - 97 14 100 % - -   12/13/17 1235 - 96.8  F (36  C) - - 98 14 100 % - -   12/13/17 1234 - - - - 98 18 100 % - -   12/13/17 1233 - 97  F (36.1  C) - - 95 17 100 % - -   12/13/17 1232 92/51 97  F (36.1  C) - - 97 16 100 % - -   12/13/17 1230 - 97  F (36.1  C) - - 97 13 100 % - -   12/13/17 1215 - - - - 104 13 100 % - -   12/13/17 1205 - - - - - - 100 % - -   12/13/17 1200 102/51 - - - 111 (!) 31 100 % - -   12/13/17 1149 - 96.4  F (35.8  C) Temporal - - - - - -   12/13/17 1148 (!) 89/53 - - - 106 12 100 % - -   12/13/17 1145 91/57 - - - 101 9 100 % - -   12/13/17 1141 - - - - 109 (!) 33 95 % - -   12/13/17 1140 - - - - 108 9 100 % - -   12/13/17 1139 - - - - 109 10 " (!) 86 % - -   12/13/17 1138 103/63 - - - - (!) 32 - - -   12/13/17 1137 103/63 - - 110 - 8 (!) 87 % - -       Physical Exam  Constitutional: Well developed, obtunded  Head: Atraumatic.   Mouth/Throat: Oropharynx is clear and moist.   Neck:  no stridor  Eyes: no scleral icterus, pupils dilated but reactive   Cardiovascular: Reg tachycardia, 2+ bilat radial pulses  Pulmonary/Chest: nml resp effort, Clear BS bilat  Abdominal:  +BS, soft, NT, no rebound or guarding   Ext: Warm, well perfused, no edema  Neurological: GCS 6, symmetric facies  Skin: Skin is warm and dry.   Nursing note and vitals reviewed.        Emergency Department Course   ECG:  Indication: Loss of Consciousness  Time: 1152  Vent. Rate 111 bpm. MT interval 132. QRS duration 82. QT/QTc 402/546. P-R-T axis 59 80 64.  Sinus tachycardia. Possible left atrial enlargement. Possible anterior infarct, age undetermined. Prolonged QT. Abnormal ECG. Read time: 1200    Time: 1554  Vent. Rate 102 bpm. MT interval *. QRS duration 82. QT/QTc 338/440. P-R-T axis * 86 41. Septal infarct, age undetermined. Sinus rhythm with PVC's. Improved QTC from previous. Read time: 1610    Imaging:  Radiographic findings were communicated with the patient who voiced understanding of the findings.  CT Head without contrast:   Normal Head CT,  as per radiology.    XR Chest Port 1:   No acute pulmonary abnormality, as per radiology.     Laboratory:  CBC: WBC: 15.6 (H), HGB: 14.3, PLT: 390   CMP: Glucose 107 (H), Potassium 2.7 (L), Anion gap 15 (H), Creatinine 1.22 (H), GFR 47 (L), Alkphos 281 (H),  (H),  (H), o/w WNL    1151 Glucose: 99    1259 Troponin: <0.015    1151 ISTAT VBG: pH 7.44 (H) / PCO2 39 (L) / PO2 35 / Bicarb 26, Lactic acid 5.2 (HH)  1337 ISTAT VBG: pH 7.23 (L) / PCO2 44 / PO2 59 (H) / Bicarb 18 (L), Lactic acid 4.0 (HH)  1459 ABG: pH 7.22 (L) / PCO2 41 / PO2 193 (H) / Bicarb 17 (L)      INR: 1.05    Salicylate level: <2  Acetaminophen level: 39  (HH)    Alcohol ethyl: 0.17 (H)    Drug abuse screen: cannabinoids positive (A), o/w negative    TSH: 5.06 (H)    HCG: negative  UA with Microscopic: blood small (A), RBC 3 (H), mucous present (A), o/w WNL    Procedures:      Central Line Placement     Procedure:  Central Line Placement with Ultrasound Guidance.    Indications: Vascular access, Fluid administration and pressor administration    Consent:  Risks (including but not limited to: pneumothorax,bleeding, infection or artery puncture), was unable to be explained and consent was unable to obtain due to emergency conditions.    Timeout:  Universal protocol was followed. TIME OUT conducted just prior to starting procedure confirmed patient identity, site/side, procedure, patient position, and availability of correct equipment and implants.?  Yes    Procedure note:  Right Internal Jugular approach was selected and the right anterior neck was prepped, cleansed and draped in a sterile fashion.  Mask, gown and gloves were used per sterile protocol.  Patient was then placed into Trendelenburg position and lidocaine was used for local anesthesia.  Vascular probe with ultrasound was used in a sterile fashion for guidence.  Introducer needle was then used to gain access to the central venous circulation.  Using Seldinger technique w/ CHOE confirmation prior to dilation the  Triple lumen catheter was placed.  Catheter port(s) were aspirated and flushed.  Central line was sutured in place and sterile dressing applied.   Appropriate placement was confirmed by x-ray and no pneumothorax was visualized.    Patient Status:  Patient tolerated the procedure well.  There were no complications.        Rapid Sequence Intubation Procedure note         Indication:  Protect Airway      The patient was premedicated with Atropine, Ketamine and Rocuronium (Zemuron) and     intubated by Dr. Dennis with a #7.5 cuffed, ET Tube and a Curved blade.    Tube placed at 23 at the lip. Following  intubation the patient's breath sounds were  equal.  ET Tube placement was confirmed with     auscultation, ETCO2 monitor, CXR and CO2 detector device.     MONITORING:   Monitoring consisted of : heart rate, cardiac monitor, continuous pulse oximetry, blood pressure checks, level of consciousness, IV access, constant attendance by RN, and MD attendance until patient stable or transfer of care    PATIENT STATUS:   Complications from this procedure were none. Oxygen saturations following intubation were 100%.    Interventions:  1145 NS 1L IV  1153 Atropine   Ketamine 150 mg IV injection  1154 Rocuronium 80 IV injection  1222 NS 1L IV  1243 NS 1L IV   Sodium bicarbonate 25 mEq IV  1328 Calcium chloride 1 g IV injection  1333 Glucagon 3 mg IV injection  1334 Acetadote 7.46 g IV infusion  1348 Benadryl 25 mg IV injection   Epinephrine 0.3 mg Subcutaneous injection  1442 Solu-medrol 81.25 mg IV injection  1449 Lactated ringers 2L IV  1501 Calcium chloride 1 g IV injection  1517 Levophed 0.127 mcg/kg/min IV  1542 Lorazepam, 2 mg, IV injection  1552 Versed 5 mg IV  1609 Fentanyl 100 mcg IV  1617 Dextrose 25 g IV  1628 Insulin 52.2 units IV  1629 Sodium bicarbonate 50 mEq IV  1648 Versed 5 mg IV  1653 Versed 5 mg IV  1700 Ketamine 100 mg IV  Please see MAR for full list of medications administered in the ED.    Emergency Department Course:  1136 The patient arrived here in the ED.    Nursing notes and vitals reviewed. 1137 I performed an exam of the patient as documented above.     IV inserted. Medicine administered as documented above. Blood drawn. This was sent to the lab for further testing, results above.    The patient provided a urine sample here in the emergency department. This was sent for laboratory testing, findings above.     The patient was sent for a Head CT & Chest XR while in the emergency department, findings above.     EKG obtained in the ED, see results above.     1153  The patient was given Atropine,  followed by 150 of Ketamine    1154 The patient was given 80 of Rocuronium.    1155 I intubated the patient, as noted above, without complications.     1207 I performed a POCUS for central line placement.    1218 I placed a central line with US guidance, as noted above.     1345 I rechecked the patient and discussed the results of her workup thus far.     1504 I reevaluated the patient and provided an update in regards to her ED course.      1522  I consulted with Dr. Lo of the hospitalist services. They are in agreement to accept the patient for admission.    1530 I consulted with Poison Control regarding the patient's history and presentation here in the emergency department.    1540 The patient is having seizure-like activity.    Patient transferred to the ICU. Discussed the patient with Dr. Lo, who will admit the patient to an ICU bed for further monitoring, evaluation, and treatment.    Impression & Plan       Medical Decision Making:  Ana Laura Wharton is a 47 year old female presenting status-post reported overdose with Nyquil, amlodipine, and EtOH.     Differential diagnosis includes toxidrome, polysubstance toxidrome including calcium channel blocker overdose, electrolyte abnormality, intercranial abnormality, overdose not otherwise specified. Labs were as noted above, as well as imaging.  Initially EKG sig for prolonged QTc which pt was given sodium bicarb and magnesium to treat. Shortly after arrival, the pertinent was noted to have a GCS less that 8, and was intubated for airway protection (please see procedure note above). Given the patient's hypotension and the fact that she would likely need blood pressure support, a central line was placed (please see procedure note above). The patient received multiple doses of calcium chloride, with improvement in her blood pressure, as well as glucagon with subsequent improvement of her blood pressure. She was started on norepinephrine for further  treatment of her hypotension with adequate result. I discussed the patient with poison control, who recommended a high dose of insulin and glucose, which the patient was subsequently started on. Later in her ED stay, she became less sedated, briefly followed directions, and improved with sedation having initially tried midazolam, and subsequently having Ketamine. Her labs returned with an improved elevated lactate level. VBG was with an initial normal pH which worsened later. Her respiratory rate was subsequently increased after her second VBG. Arterial BG was drawn, which showed improvement in her pH, but otherwise her lactate level was elevated. I believe this is in part due to the patient's difficulty with sedation for a short period of time. She was given subsequent crystalloid as well. At this point, I feel it would be appropriate to admit the patient to the ICU for further evaluation and management. Given her elevated Tylenol level, as well as her transaminases, she was started on a 21 hour protocol for acetaminophen overdose. Poison control noted that they can be contacted with questions, and the  is [place]. The patient should receive LFT's and acetaminophen level prior to her 3rd bag of acetyl-cysteine. Poison control also recommended that she have an echocardiogram, given her calcium channel blocker overdose. I did a brief bedside echo, which showed grossly adequate left ventricular systolic function, and no pericardial effusion. The patient was transferred to the ICU in critical condition.     Critical Care time:  was 75 minutes for this patient excluding procedures.    Diagnosis:    ICD-10-CM    1. Calcium channel blocker overdose, undetermined intent, initial encounter T46.1X4A Acetaminophen level     Lactic acid     Glucose by meter     Glucose by meter     Glucose by meter     Glucose by meter   2. Acetaminophen overdose of undetermined intent, initial encounter T39.1X4A       Disposition:  Admitted to the ICU with Dr. Wally CRUZ, Ashely Paz, am serving as a scribe on 12/13/2017 at 11:37 AM to personally document services performed by No att. providers found based on my observations and the provider's statements to me.     Ashely Paz  12/13/2017   [facility] EMERGENCY DEPARTMENT       Celso Dennis MD  12/14/17 0116

## 2017-12-13 NOTE — ED NOTES
Swelling noted on pt neck, face, eyes and tongue, MD called to bedside, benadryl and IM epi 0.3mg given. RT at bedside pt airway stable on vent. BP 94/51 HR 95

## 2017-12-14 ENCOUNTER — APPOINTMENT (OUTPATIENT)
Dept: GENERAL RADIOLOGY | Facility: CLINIC | Age: 47
DRG: 917 | End: 2017-12-14
Attending: STUDENT IN AN ORGANIZED HEALTH CARE EDUCATION/TRAINING PROGRAM
Payer: COMMERCIAL

## 2017-12-14 ENCOUNTER — APPOINTMENT (OUTPATIENT)
Dept: GENERAL RADIOLOGY | Facility: CLINIC | Age: 47
DRG: 917 | End: 2017-12-14
Attending: INTERNAL MEDICINE
Payer: COMMERCIAL

## 2017-12-14 ENCOUNTER — APPOINTMENT (OUTPATIENT)
Dept: CARDIOLOGY | Facility: CLINIC | Age: 47
DRG: 917 | End: 2017-12-14
Attending: INTERNAL MEDICINE
Payer: COMMERCIAL

## 2017-12-14 ENCOUNTER — ANESTHESIA EVENT (OUTPATIENT)
Dept: INTENSIVE CARE | Facility: CLINIC | Age: 47
DRG: 917 | End: 2017-12-14
Payer: COMMERCIAL

## 2017-12-14 ENCOUNTER — ANESTHESIA (OUTPATIENT)
Dept: INTENSIVE CARE | Facility: CLINIC | Age: 47
DRG: 917 | End: 2017-12-14
Payer: COMMERCIAL

## 2017-12-14 LAB
ALBUMIN SERPL-MCNC: 2 G/DL (ref 3.4–5)
ALBUMIN SERPL-MCNC: 2.5 G/DL (ref 3.4–5)
ALBUMIN SERPL-MCNC: 2.6 G/DL (ref 3.4–5)
ALP SERPL-CCNC: 131 U/L (ref 40–150)
ALP SERPL-CCNC: 145 U/L (ref 40–150)
ALP SERPL-CCNC: 149 U/L (ref 40–150)
ALT SERPL W P-5'-P-CCNC: 64 U/L (ref 0–50)
ALT SERPL W P-5'-P-CCNC: 82 U/L (ref 0–50)
ALT SERPL W P-5'-P-CCNC: 86 U/L (ref 0–50)
ANION GAP SERPL CALCULATED.3IONS-SCNC: 10 MMOL/L (ref 3–14)
ANION GAP SERPL CALCULATED.3IONS-SCNC: 12 MMOL/L (ref 3–14)
ANION GAP SERPL CALCULATED.3IONS-SCNC: 15 MMOL/L (ref 3–14)
ANION GAP SERPL CALCULATED.3IONS-SCNC: 15 MMOL/L (ref 3–14)
ANION GAP SERPL CALCULATED.3IONS-SCNC: 16 MMOL/L (ref 3–14)
ANION GAP SERPL CALCULATED.3IONS-SCNC: 17 MMOL/L (ref 3–14)
APAP SERPL-MCNC: NORMAL MG/L (ref 10–20)
APTT PPP: 26 SEC (ref 22–37)
AST SERPL W P-5'-P-CCNC: 102 U/L (ref 0–45)
AST SERPL W P-5'-P-CCNC: 67 U/L (ref 0–45)
AST SERPL W P-5'-P-CCNC: 96 U/L (ref 0–45)
BASE DEFICIT BLDA-SCNC: 11.3 MMOL/L
BASE DEFICIT BLDA-SCNC: 11.4 MMOL/L
BASE DEFICIT BLDA-SCNC: 11.7 MMOL/L
BASE DEFICIT BLDA-SCNC: 12.7 MMOL/L
BASE DEFICIT BLDA-SCNC: 13 MMOL/L
BASE DEFICIT BLDA-SCNC: 14.7 MMOL/L
BASE DEFICIT BLDA-SCNC: 15.4 MMOL/L
BASE DEFICIT BLDA-SCNC: 17.5 MMOL/L
BASE DEFICIT BLDA-SCNC: 4.7 MMOL/L
BASE DEFICIT BLDA-SCNC: 6.5 MMOL/L
BASE DEFICIT BLDA-SCNC: 6.7 MMOL/L
BASE DEFICIT BLDA-SCNC: 7.1 MMOL/L
BASE DEFICIT BLDV-SCNC: 11 MMOL/L
BASE DEFICIT BLDV-SCNC: 13.6 MMOL/L
BASE DEFICIT BLDV-SCNC: 21 MMOL/L
BASE DEFICIT BLDV-SCNC: NORMAL MMOL/L
BASE EXCESS BLDV CALC-SCNC: NORMAL MMOL/L
BASOPHILS # BLD AUTO: 0 10E9/L (ref 0–0.2)
BASOPHILS NFR BLD AUTO: 0.1 %
BILIRUB SERPL-MCNC: 0.4 MG/DL (ref 0.2–1.3)
BILIRUB SERPL-MCNC: 0.6 MG/DL (ref 0.2–1.3)
BILIRUB SERPL-MCNC: 0.6 MG/DL (ref 0.2–1.3)
BUN SERPL-MCNC: 16 MG/DL (ref 7–30)
BUN SERPL-MCNC: 17 MG/DL (ref 7–30)
BUN SERPL-MCNC: 18 MG/DL (ref 7–30)
CA-I BLD-MCNC: 6.4 MG/DL (ref 4.4–5.2)
CA-I BLD-MCNC: 6.5 MG/DL (ref 4.4–5.2)
CA-I BLD-MCNC: 6.6 MG/DL (ref 4.4–5.2)
CA-I BLD-MCNC: 6.8 MG/DL (ref 4.4–5.2)
CA-I BLD-MCNC: 6.9 MG/DL (ref 4.4–5.2)
CA-I BLD-MCNC: 7 MG/DL (ref 4.4–5.2)
CA-I BLD-MCNC: 7 MG/DL (ref 4.4–5.2)
CA-I BLD-MCNC: 7.2 MG/DL (ref 4.4–5.2)
CA-I BLD-MCNC: 7.6 MG/DL (ref 4.4–5.2)
CA-I BLD-MCNC: 9.7 MG/DL (ref 4.4–5.2)
CALCIUM SERPL-MCNC: 10.8 MG/DL (ref 8.5–10.1)
CALCIUM SERPL-MCNC: 11 MG/DL (ref 8.5–10.1)
CALCIUM SERPL-MCNC: 11.1 MG/DL (ref 8.5–10.1)
CALCIUM SERPL-MCNC: 9.7 MG/DL (ref 8.5–10.1)
CHLORIDE SERPL-SCNC: 113 MMOL/L (ref 94–109)
CHLORIDE SERPL-SCNC: 114 MMOL/L (ref 94–109)
CHLORIDE SERPL-SCNC: 115 MMOL/L (ref 94–109)
CHLORIDE SERPL-SCNC: 115 MMOL/L (ref 94–109)
CHLORIDE SERPL-SCNC: 116 MMOL/L (ref 94–109)
CHLORIDE SERPL-SCNC: 117 MMOL/L (ref 94–109)
CO2 SERPL-SCNC: 12 MMOL/L (ref 20–32)
CO2 SERPL-SCNC: 12 MMOL/L (ref 20–32)
CO2 SERPL-SCNC: 15 MMOL/L (ref 20–32)
CO2 SERPL-SCNC: 16 MMOL/L (ref 20–32)
CO2 SERPL-SCNC: 17 MMOL/L (ref 20–32)
CO2 SERPL-SCNC: 24 MMOL/L (ref 20–32)
COMPREHEN DRUG ANALYSIS UR: NORMAL
CREAT SERPL-MCNC: 1.13 MG/DL (ref 0.52–1.04)
CREAT SERPL-MCNC: 1.17 MG/DL (ref 0.52–1.04)
CREAT SERPL-MCNC: 1.2 MG/DL (ref 0.52–1.04)
CREAT SERPL-MCNC: 1.3 MG/DL (ref 0.52–1.04)
CREAT SERPL-MCNC: 1.43 MG/DL (ref 0.52–1.04)
CREAT SERPL-MCNC: 1.58 MG/DL (ref 0.52–1.04)
CRP SERPL-MCNC: 8 MG/L (ref 0–8)
DIFFERENTIAL METHOD BLD: ABNORMAL
EOSINOPHIL # BLD AUTO: 0 10E9/L (ref 0–0.7)
EOSINOPHIL NFR BLD AUTO: 0.1 %
ERYTHROCYTE [DISTWIDTH] IN BLOOD BY AUTOMATED COUNT: 16.8 % (ref 10–15)
ERYTHROCYTE [DISTWIDTH] IN BLOOD BY AUTOMATED COUNT: 16.9 % (ref 10–15)
ERYTHROCYTE [DISTWIDTH] IN BLOOD BY AUTOMATED COUNT: 16.9 % (ref 10–15)
ERYTHROCYTE [DISTWIDTH] IN BLOOD BY AUTOMATED COUNT: 17.1 % (ref 10–15)
FIBRINOGEN PPP-MCNC: 209 MG/DL (ref 200–420)
FIBRINOGEN PPP-MCNC: 230 MG/DL (ref 200–420)
GFR SERPL CREATININE-BSD FRML MDRD: 35 ML/MIN/1.7M2
GFR SERPL CREATININE-BSD FRML MDRD: 39 ML/MIN/1.7M2
GFR SERPL CREATININE-BSD FRML MDRD: 44 ML/MIN/1.7M2
GFR SERPL CREATININE-BSD FRML MDRD: 48 ML/MIN/1.7M2
GFR SERPL CREATININE-BSD FRML MDRD: 50 ML/MIN/1.7M2
GFR SERPL CREATININE-BSD FRML MDRD: 52 ML/MIN/1.7M2
GLUCOSE BLDC GLUCOMTR-MCNC: 111 MG/DL (ref 70–99)
GLUCOSE BLDC GLUCOMTR-MCNC: 120 MG/DL (ref 70–99)
GLUCOSE BLDC GLUCOMTR-MCNC: 139 MG/DL (ref 70–99)
GLUCOSE BLDC GLUCOMTR-MCNC: 142 MG/DL (ref 70–99)
GLUCOSE BLDC GLUCOMTR-MCNC: 143 MG/DL (ref 70–99)
GLUCOSE BLDC GLUCOMTR-MCNC: 144 MG/DL (ref 70–99)
GLUCOSE BLDC GLUCOMTR-MCNC: 146 MG/DL (ref 70–99)
GLUCOSE BLDC GLUCOMTR-MCNC: 146 MG/DL (ref 70–99)
GLUCOSE BLDC GLUCOMTR-MCNC: 149 MG/DL (ref 70–99)
GLUCOSE BLDC GLUCOMTR-MCNC: 152 MG/DL (ref 70–99)
GLUCOSE BLDC GLUCOMTR-MCNC: 154 MG/DL (ref 70–99)
GLUCOSE BLDC GLUCOMTR-MCNC: 157 MG/DL (ref 70–99)
GLUCOSE BLDC GLUCOMTR-MCNC: 162 MG/DL (ref 70–99)
GLUCOSE BLDC GLUCOMTR-MCNC: 168 MG/DL (ref 70–99)
GLUCOSE BLDC GLUCOMTR-MCNC: 172 MG/DL (ref 70–99)
GLUCOSE BLDC GLUCOMTR-MCNC: 172 MG/DL (ref 70–99)
GLUCOSE BLDC GLUCOMTR-MCNC: 173 MG/DL (ref 70–99)
GLUCOSE BLDC GLUCOMTR-MCNC: 174 MG/DL (ref 70–99)
GLUCOSE BLDC GLUCOMTR-MCNC: 175 MG/DL (ref 70–99)
GLUCOSE BLDC GLUCOMTR-MCNC: 180 MG/DL (ref 70–99)
GLUCOSE BLDC GLUCOMTR-MCNC: 181 MG/DL (ref 70–99)
GLUCOSE BLDC GLUCOMTR-MCNC: 182 MG/DL (ref 70–99)
GLUCOSE BLDC GLUCOMTR-MCNC: 183 MG/DL (ref 70–99)
GLUCOSE BLDC GLUCOMTR-MCNC: 184 MG/DL (ref 70–99)
GLUCOSE BLDC GLUCOMTR-MCNC: 185 MG/DL (ref 70–99)
GLUCOSE BLDC GLUCOMTR-MCNC: 186 MG/DL (ref 70–99)
GLUCOSE BLDC GLUCOMTR-MCNC: 186 MG/DL (ref 70–99)
GLUCOSE BLDC GLUCOMTR-MCNC: 187 MG/DL (ref 70–99)
GLUCOSE BLDC GLUCOMTR-MCNC: 189 MG/DL (ref 70–99)
GLUCOSE BLDC GLUCOMTR-MCNC: 190 MG/DL (ref 70–99)
GLUCOSE BLDC GLUCOMTR-MCNC: 190 MG/DL (ref 70–99)
GLUCOSE BLDC GLUCOMTR-MCNC: 191 MG/DL (ref 70–99)
GLUCOSE BLDC GLUCOMTR-MCNC: 191 MG/DL (ref 70–99)
GLUCOSE BLDC GLUCOMTR-MCNC: 194 MG/DL (ref 70–99)
GLUCOSE BLDC GLUCOMTR-MCNC: 200 MG/DL (ref 70–99)
GLUCOSE BLDC GLUCOMTR-MCNC: 201 MG/DL (ref 70–99)
GLUCOSE BLDC GLUCOMTR-MCNC: 206 MG/DL (ref 70–99)
GLUCOSE BLDC GLUCOMTR-MCNC: 207 MG/DL (ref 70–99)
GLUCOSE BLDC GLUCOMTR-MCNC: 207 MG/DL (ref 70–99)
GLUCOSE BLDC GLUCOMTR-MCNC: 209 MG/DL (ref 70–99)
GLUCOSE BLDC GLUCOMTR-MCNC: 211 MG/DL (ref 70–99)
GLUCOSE BLDC GLUCOMTR-MCNC: 229 MG/DL (ref 70–99)
GLUCOSE BLDC GLUCOMTR-MCNC: 232 MG/DL (ref 70–99)
GLUCOSE BLDC GLUCOMTR-MCNC: 232 MG/DL (ref 70–99)
GLUCOSE BLDC GLUCOMTR-MCNC: 285 MG/DL (ref 70–99)
GLUCOSE SERPL-MCNC: 142 MG/DL (ref 70–99)
GLUCOSE SERPL-MCNC: 162 MG/DL (ref 70–99)
GLUCOSE SERPL-MCNC: 165 MG/DL (ref 70–99)
GLUCOSE SERPL-MCNC: 174 MG/DL (ref 70–99)
GLUCOSE SERPL-MCNC: 190 MG/DL (ref 70–99)
GLUCOSE SERPL-MCNC: 278 MG/DL (ref 70–99)
HCO3 BLD-SCNC: 13 MMOL/L (ref 21–28)
HCO3 BLD-SCNC: 13 MMOL/L (ref 21–28)
HCO3 BLD-SCNC: 14 MMOL/L (ref 21–28)
HCO3 BLD-SCNC: 14 MMOL/L (ref 21–28)
HCO3 BLD-SCNC: 15 MMOL/L (ref 21–28)
HCO3 BLD-SCNC: 16 MMOL/L (ref 21–28)
HCO3 BLD-SCNC: 20 MMOL/L (ref 21–28)
HCO3 BLD-SCNC: 23 MMOL/L (ref 21–28)
HCO3 BLD-SCNC: 24 MMOL/L (ref 21–28)
HCO3 BLD-SCNC: 25 MMOL/L (ref 21–28)
HCO3 BLDV-SCNC: 15 MMOL/L (ref 21–28)
HCO3 BLDV-SCNC: 17 MMOL/L (ref 21–28)
HCO3 BLDV-SCNC: 8 MMOL/L (ref 21–28)
HCO3 BLDV-SCNC: NORMAL MMOL/L (ref 21–28)
HCT VFR BLD AUTO: 30.6 % (ref 35–47)
HCT VFR BLD AUTO: 32.2 % (ref 35–47)
HCT VFR BLD AUTO: 32.8 % (ref 35–47)
HCT VFR BLD AUTO: 32.8 % (ref 35–47)
HGB BLD-MCNC: 10.3 G/DL (ref 11.7–15.7)
HGB BLD-MCNC: 10.4 G/DL (ref 11.7–15.7)
HGB BLD-MCNC: 10.7 G/DL (ref 11.7–15.7)
HGB BLD-MCNC: 9.9 G/DL (ref 11.7–15.7)
IMM GRANULOCYTES # BLD: 0.3 10E9/L (ref 0–0.4)
IMM GRANULOCYTES NFR BLD: 1.6 %
INR PPP: 1.43 (ref 0.86–1.14)
INR PPP: 1.46 (ref 0.86–1.14)
INR PPP: 1.49 (ref 0.86–1.14)
INR PPP: 1.56 (ref 0.86–1.14)
INTERPRETATION ECG - MUSE: NORMAL
LACTATE BLD-SCNC: 3.3 MMOL/L (ref 0.7–2)
LACTATE BLD-SCNC: 3.6 MMOL/L (ref 0.7–2)
LACTATE BLD-SCNC: 4.1 MMOL/L (ref 0.7–2)
LACTATE BLD-SCNC: 4.9 MMOL/L (ref 0.7–2)
LACTATE BLD-SCNC: 5.2 MMOL/L (ref 0.7–2)
LACTATE BLD-SCNC: 6 MMOL/L (ref 0.7–2)
LACTATE BLD-SCNC: 6.3 MMOL/L (ref 0.7–2)
LACTATE BLD-SCNC: 6.5 MMOL/L (ref 0.7–2)
LACTATE BLD-SCNC: 6.6 MMOL/L (ref 0.7–2)
LACTATE BLD-SCNC: 7.1 MMOL/L (ref 0.7–2)
LACTATE BLD-SCNC: 8.3 MMOL/L (ref 0.7–2)
LYMPHOCYTES # BLD AUTO: 1.8 10E9/L (ref 0.8–5.3)
LYMPHOCYTES NFR BLD AUTO: 9.4 %
MAGNESIUM SERPL-MCNC: 1.3 MG/DL (ref 1.6–2.3)
MAGNESIUM SERPL-MCNC: 1.8 MG/DL (ref 1.6–2.3)
MAGNESIUM SERPL-MCNC: 2 MG/DL (ref 1.6–2.3)
MAGNESIUM SERPL-MCNC: 2.1 MG/DL (ref 1.6–2.3)
MCH RBC QN AUTO: 30 PG (ref 26.5–33)
MCH RBC QN AUTO: 30.4 PG (ref 26.5–33)
MCH RBC QN AUTO: 30.4 PG (ref 26.5–33)
MCH RBC QN AUTO: 30.7 PG (ref 26.5–33)
MCHC RBC AUTO-ENTMCNC: 31.4 G/DL (ref 31.5–36.5)
MCHC RBC AUTO-ENTMCNC: 32.3 G/DL (ref 31.5–36.5)
MCHC RBC AUTO-ENTMCNC: 32.4 G/DL (ref 31.5–36.5)
MCHC RBC AUTO-ENTMCNC: 32.6 G/DL (ref 31.5–36.5)
MCV RBC AUTO: 93 FL (ref 78–100)
MCV RBC AUTO: 94 FL (ref 78–100)
MCV RBC AUTO: 94 FL (ref 78–100)
MCV RBC AUTO: 97 FL (ref 78–100)
METHGB MFR BLD: 1.7 % (ref 0–3)
MONOCYTES # BLD AUTO: 1.2 10E9/L (ref 0–1.3)
MONOCYTES NFR BLD AUTO: 6.3 %
MRSA DNA SPEC QL NAA+PROBE: NEGATIVE
NEUTROPHILS # BLD AUTO: 16.2 10E9/L (ref 1.6–8.3)
NEUTROPHILS NFR BLD AUTO: 82.5 %
NRBC # BLD AUTO: 0 10*3/UL
NRBC BLD AUTO-RTO: 0 /100
O2/TOTAL GAS SETTING VFR VENT: 100 %
O2/TOTAL GAS SETTING VFR VENT: 40 %
O2/TOTAL GAS SETTING VFR VENT: 75 %
O2/TOTAL GAS SETTING VFR VENT: 75 %
O2/TOTAL GAS SETTING VFR VENT: 85 %
O2/TOTAL GAS SETTING VFR VENT: 90 %
O2/TOTAL GAS SETTING VFR VENT: 95 %
O2/TOTAL GAS SETTING VFR VENT: NORMAL %
OXYHGB MFR BLDV: 66 %
OXYHGB MFR BLDV: 66 %
OXYHGB MFR BLDV: 73 %
OXYHGB MFR BLDV: NORMAL %
PCO2 BLD: 26 MM HG (ref 35–45)
PCO2 BLD: 33 MM HG (ref 35–45)
PCO2 BLD: 37 MM HG (ref 35–45)
PCO2 BLD: 38 MM HG (ref 35–45)
PCO2 BLD: 43 MM HG (ref 35–45)
PCO2 BLD: 49 MM HG (ref 35–45)
PCO2 BLD: 49 MM HG (ref 35–45)
PCO2 BLD: 51 MM HG (ref 35–45)
PCO2 BLD: 54 MM HG (ref 35–45)
PCO2 BLD: 73 MM HG (ref 35–45)
PCO2 BLD: 73 MM HG (ref 35–45)
PCO2 BLD: 75 MM HG (ref 35–45)
PCO2 BLDV: 32 MM HG (ref 40–50)
PCO2 BLDV: 43 MM HG (ref 40–50)
PCO2 BLDV: 46 MM HG (ref 40–50)
PCO2 BLDV: NORMAL MM HG (ref 40–50)
PH BLD: 7 PH (ref 7.35–7.45)
PH BLD: 7.07 PH (ref 7.35–7.45)
PH BLD: 7.08 PH (ref 7.35–7.45)
PH BLD: 7.12 PH (ref 7.35–7.45)
PH BLD: 7.12 PH (ref 7.35–7.45)
PH BLD: 7.14 PH (ref 7.35–7.45)
PH BLD: 7.19 PH (ref 7.35–7.45)
PH BLD: 7.22 PH (ref 7.35–7.45)
PH BLD: 7.22 PH (ref 7.35–7.45)
PH BLD: 7.23 PH (ref 7.35–7.45)
PH BLD: 7.23 PH (ref 7.35–7.45)
PH BLD: 7.29 PH (ref 7.35–7.45)
PH BLDV: 7.01 PH (ref 7.32–7.43)
PH BLDV: 7.14 PH (ref 7.32–7.43)
PH BLDV: 7.17 PH (ref 7.32–7.43)
PH BLDV: NORMAL PH (ref 7.32–7.43)
PHOSPHATE SERPL-MCNC: 0.7 MG/DL (ref 2.5–4.5)
PHOSPHATE SERPL-MCNC: 1.8 MG/DL (ref 2.5–4.5)
PHOSPHATE SERPL-MCNC: 4.4 MG/DL (ref 2.5–4.5)
PHOSPHATE SERPL-MCNC: 5.3 MG/DL (ref 2.5–4.5)
PHOSPHATE SERPL-MCNC: 6.7 MG/DL (ref 2.5–4.5)
PLATELET # BLD AUTO: 152 10E9/L (ref 150–450)
PLATELET # BLD AUTO: 212 10E9/L (ref 150–450)
PLATELET # BLD AUTO: 216 10E9/L (ref 150–450)
PLATELET # BLD AUTO: 237 10E9/L (ref 150–450)
PO2 BLD: 139 MM HG (ref 80–105)
PO2 BLD: 57 MM HG (ref 80–105)
PO2 BLD: 59 MM HG (ref 80–105)
PO2 BLD: 64 MM HG (ref 80–105)
PO2 BLD: 66 MM HG (ref 80–105)
PO2 BLD: 68 MM HG (ref 80–105)
PO2 BLD: 71 MM HG (ref 80–105)
PO2 BLD: 73 MM HG (ref 80–105)
PO2 BLD: 88 MM HG (ref 80–105)
PO2 BLD: 91 MM HG (ref 80–105)
PO2 BLD: 93 MM HG (ref 80–105)
PO2 BLD: 95 MM HG (ref 80–105)
PO2 BLDV: 41 MM HG (ref 25–47)
PO2 BLDV: 47 MM HG (ref 25–47)
PO2 BLDV: 48 MM HG (ref 25–47)
PO2 BLDV: NORMAL MM HG (ref 25–47)
POTASSIUM SERPL-SCNC: 2.6 MMOL/L (ref 3.4–5.3)
POTASSIUM SERPL-SCNC: 3.1 MMOL/L (ref 3.4–5.3)
POTASSIUM SERPL-SCNC: 3.4 MMOL/L (ref 3.4–5.3)
POTASSIUM SERPL-SCNC: 3.4 MMOL/L (ref 3.4–5.3)
POTASSIUM SERPL-SCNC: 3.5 MMOL/L (ref 3.4–5.3)
POTASSIUM SERPL-SCNC: 3.6 MMOL/L (ref 3.4–5.3)
POTASSIUM SERPL-SCNC: 3.6 MMOL/L (ref 3.4–5.3)
PROCALCITONIN SERPL-MCNC: 1.07 NG/ML
PROT SERPL-MCNC: 4.4 G/DL (ref 6.8–8.8)
PROT SERPL-MCNC: 4.9 G/DL (ref 6.8–8.8)
PROT SERPL-MCNC: 5 G/DL (ref 6.8–8.8)
RADIOLOGIST FLAGS: ABNORMAL
RBC # BLD AUTO: 3.26 10E12/L (ref 3.8–5.2)
RBC # BLD AUTO: 3.39 10E12/L (ref 3.8–5.2)
RBC # BLD AUTO: 3.47 10E12/L (ref 3.8–5.2)
RBC # BLD AUTO: 3.48 10E12/L (ref 3.8–5.2)
SODIUM SERPL-SCNC: 142 MMOL/L (ref 133–144)
SODIUM SERPL-SCNC: 144 MMOL/L (ref 133–144)
SODIUM SERPL-SCNC: 145 MMOL/L (ref 133–144)
SODIUM SERPL-SCNC: 146 MMOL/L (ref 133–144)
SODIUM SERPL-SCNC: 147 MMOL/L (ref 133–144)
SODIUM SERPL-SCNC: 148 MMOL/L (ref 133–144)
SPECIMEN SOURCE: NORMAL
WBC # BLD AUTO: 19.6 10E9/L (ref 4–11)
WBC # BLD AUTO: 30.3 10E9/L (ref 4–11)
WBC # BLD AUTO: 30.3 10E9/L (ref 4–11)
WBC # BLD AUTO: 36.5 10E9/L (ref 4–11)

## 2017-12-14 PROCEDURE — 40000556 ZZH STATISTIC PERIPHERAL IV START W US GUIDANCE

## 2017-12-14 PROCEDURE — 85730 THROMBOPLASTIN TIME PARTIAL: CPT | Performed by: INTERNAL MEDICINE

## 2017-12-14 PROCEDURE — 80048 BASIC METABOLIC PNL TOTAL CA: CPT | Performed by: INTERNAL MEDICINE

## 2017-12-14 PROCEDURE — 94640 AIRWAY INHALATION TREATMENT: CPT | Mod: 76

## 2017-12-14 PROCEDURE — 82330 ASSAY OF CALCIUM: CPT | Performed by: INTERNAL MEDICINE

## 2017-12-14 PROCEDURE — 80048 BASIC METABOLIC PNL TOTAL CA: CPT | Performed by: STUDENT IN AN ORGANIZED HEALTH CARE EDUCATION/TRAINING PROGRAM

## 2017-12-14 PROCEDURE — 40000014 ZZH STATISTIC ARTERIAL MONITORING DAILY

## 2017-12-14 PROCEDURE — 25000128 H RX IP 250 OP 636: Performed by: STUDENT IN AN ORGANIZED HEALTH CARE EDUCATION/TRAINING PROGRAM

## 2017-12-14 PROCEDURE — 93005 ELECTROCARDIOGRAM TRACING: CPT

## 2017-12-14 PROCEDURE — 83735 ASSAY OF MAGNESIUM: CPT | Performed by: INTERNAL MEDICINE

## 2017-12-14 PROCEDURE — 25000131 ZZH RX MED GY IP 250 OP 636 PS 637: Mod: GY | Performed by: STUDENT IN AN ORGANIZED HEALTH CARE EDUCATION/TRAINING PROGRAM

## 2017-12-14 PROCEDURE — 83605 ASSAY OF LACTIC ACID: CPT | Performed by: INTERNAL MEDICINE

## 2017-12-14 PROCEDURE — 82805 BLOOD GASES W/O2 SATURATION: CPT | Performed by: INTERNAL MEDICINE

## 2017-12-14 PROCEDURE — 25000128 H RX IP 250 OP 636: Performed by: NURSE ANESTHETIST, CERTIFIED REGISTERED

## 2017-12-14 PROCEDURE — 83605 ASSAY OF LACTIC ACID: CPT | Performed by: STUDENT IN AN ORGANIZED HEALTH CARE EDUCATION/TRAINING PROGRAM

## 2017-12-14 PROCEDURE — 71010 XR CHEST PORT 1 VW: CPT

## 2017-12-14 PROCEDURE — 99233 SBSQ HOSP IP/OBS HIGH 50: CPT | Performed by: PSYCHIATRY & NEUROLOGY

## 2017-12-14 PROCEDURE — 84100 ASSAY OF PHOSPHORUS: CPT | Performed by: INTERNAL MEDICINE

## 2017-12-14 PROCEDURE — 99222 1ST HOSP IP/OBS MODERATE 55: CPT | Mod: 25 | Performed by: INTERNAL MEDICINE

## 2017-12-14 PROCEDURE — 99291 CRITICAL CARE FIRST HOUR: CPT | Mod: GC | Performed by: INTERNAL MEDICINE

## 2017-12-14 PROCEDURE — 74000 XR ABDOMEN PORT F1 VW: CPT

## 2017-12-14 PROCEDURE — 84132 ASSAY OF SERUM POTASSIUM: CPT | Performed by: STUDENT IN AN ORGANIZED HEALTH CARE EDUCATION/TRAINING PROGRAM

## 2017-12-14 PROCEDURE — 25000125 ZZHC RX 250: Performed by: STUDENT IN AN ORGANIZED HEALTH CARE EDUCATION/TRAINING PROGRAM

## 2017-12-14 PROCEDURE — 93010 ELECTROCARDIOGRAM REPORT: CPT | Mod: 77 | Performed by: INTERNAL MEDICINE

## 2017-12-14 PROCEDURE — 25800025 ZZH RX 258: Performed by: STUDENT IN AN ORGANIZED HEALTH CARE EDUCATION/TRAINING PROGRAM

## 2017-12-14 PROCEDURE — 40000895 ZZH STATISTIC SLP IP EVAL DEFER

## 2017-12-14 PROCEDURE — 25000128 H RX IP 250 OP 636

## 2017-12-14 PROCEDURE — 94003 VENT MGMT INPAT SUBQ DAY: CPT

## 2017-12-14 PROCEDURE — 83735 ASSAY OF MAGNESIUM: CPT | Performed by: STUDENT IN AN ORGANIZED HEALTH CARE EDUCATION/TRAINING PROGRAM

## 2017-12-14 PROCEDURE — 40000986 XR ABDOMEN PORT F1 VW

## 2017-12-14 PROCEDURE — 00000146 ZZHCL STATISTIC GLUCOSE BY METER IP

## 2017-12-14 PROCEDURE — 25000131 ZZH RX MED GY IP 250 OP 636 PS 637: Mod: GY | Performed by: INTERNAL MEDICINE

## 2017-12-14 PROCEDURE — 25000125 ZZHC RX 250: Performed by: INTERNAL MEDICINE

## 2017-12-14 PROCEDURE — 25000131 ZZH RX MED GY IP 250 OP 636 PS 637: Performed by: INTERNAL MEDICINE

## 2017-12-14 PROCEDURE — 82803 BLOOD GASES ANY COMBINATION: CPT | Performed by: INTERNAL MEDICINE

## 2017-12-14 PROCEDURE — 85027 COMPLETE CBC AUTOMATED: CPT | Performed by: INTERNAL MEDICINE

## 2017-12-14 PROCEDURE — 80053 COMPREHEN METABOLIC PANEL: CPT | Performed by: INTERNAL MEDICINE

## 2017-12-14 PROCEDURE — 86140 C-REACTIVE PROTEIN: CPT | Performed by: INTERNAL MEDICINE

## 2017-12-14 PROCEDURE — 93010 ELECTROCARDIOGRAM REPORT: CPT | Performed by: INTERNAL MEDICINE

## 2017-12-14 PROCEDURE — 40000358 ZZHCL STATISTIC DRUG SCREEN MULTIPLE (METRO): Performed by: INTERNAL MEDICINE

## 2017-12-14 PROCEDURE — 99207 ZZC APP CREDIT; MD BILLING SHARED VISIT: CPT | Performed by: SURGERY

## 2017-12-14 PROCEDURE — 83050 HGB METHEMOGLOBIN QUAN: CPT | Performed by: INTERNAL MEDICINE

## 2017-12-14 PROCEDURE — 25000128 H RX IP 250 OP 636: Performed by: INTERNAL MEDICINE

## 2017-12-14 PROCEDURE — 25000125 ZZHC RX 250

## 2017-12-14 PROCEDURE — 85610 PROTHROMBIN TIME: CPT | Performed by: INTERNAL MEDICINE

## 2017-12-14 PROCEDURE — 40000671 ZZH STATISTIC ANESTHESIA CASE

## 2017-12-14 PROCEDURE — 93306 TTE W/DOPPLER COMPLETE: CPT

## 2017-12-14 PROCEDURE — 85384 FIBRINOGEN ACTIVITY: CPT | Performed by: INTERNAL MEDICINE

## 2017-12-14 PROCEDURE — 80307 DRUG TEST PRSMV CHEM ANLYZR: CPT | Performed by: INTERNAL MEDICINE

## 2017-12-14 PROCEDURE — 82330 ASSAY OF CALCIUM: CPT | Performed by: STUDENT IN AN ORGANIZED HEALTH CARE EDUCATION/TRAINING PROGRAM

## 2017-12-14 PROCEDURE — 94640 AIRWAY INHALATION TREATMENT: CPT

## 2017-12-14 PROCEDURE — 84145 PROCALCITONIN (PCT): CPT | Performed by: INTERNAL MEDICINE

## 2017-12-14 PROCEDURE — 93306 TTE W/DOPPLER COMPLETE: CPT | Mod: 26 | Performed by: INTERNAL MEDICINE

## 2017-12-14 PROCEDURE — 40000986 XR CHEST PORT 1 VW

## 2017-12-14 PROCEDURE — 71010 XR CHEST PORT 1 VW: CPT | Mod: 76

## 2017-12-14 PROCEDURE — 20000004 ZZH R&B ICU UMMC

## 2017-12-14 PROCEDURE — 27210995 ZZH RX 272: Performed by: STUDENT IN AN ORGANIZED HEALTH CARE EDUCATION/TRAINING PROGRAM

## 2017-12-14 PROCEDURE — 40000141 ZZH STATISTIC PERIPHERAL IV START W/O US GUIDANCE

## 2017-12-14 PROCEDURE — 40000275 ZZH STATISTIC RCP TIME EA 10 MIN

## 2017-12-14 RX ORDER — POTASSIUM CHLORIDE 29.8 MG/ML
20 INJECTION INTRAVENOUS
Status: DISCONTINUED | OUTPATIENT
Start: 2017-12-14 | End: 2017-12-14

## 2017-12-14 RX ORDER — OLANZAPINE 5 MG/1
10 TABLET, ORALLY DISINTEGRATING ORAL ONCE
Status: DISCONTINUED | OUTPATIENT
Start: 2017-12-14 | End: 2017-12-14

## 2017-12-14 RX ORDER — FUROSEMIDE 10 MG/ML
20 INJECTION INTRAMUSCULAR; INTRAVENOUS ONCE
Status: COMPLETED | OUTPATIENT
Start: 2017-12-14 | End: 2017-12-14

## 2017-12-14 RX ORDER — DIPHENHYDRAMINE HCL 25 MG
50 CAPSULE ORAL ONCE
Status: DISCONTINUED | OUTPATIENT
Start: 2017-12-14 | End: 2017-12-14

## 2017-12-14 RX ORDER — DIPHENHYDRAMINE HYDROCHLORIDE 50 MG/ML
50 INJECTION INTRAMUSCULAR; INTRAVENOUS EVERY 6 HOURS PRN
Status: DISCONTINUED | OUTPATIENT
Start: 2017-12-14 | End: 2017-12-25

## 2017-12-14 RX ORDER — CEFEPIME 1 G/50ML
2 INJECTION, SOLUTION INTRAVENOUS EVERY 12 HOURS
Status: DISCONTINUED | OUTPATIENT
Start: 2017-12-15 | End: 2017-12-15

## 2017-12-14 RX ORDER — LORAZEPAM 2 MG/ML
.5-4 INJECTION INTRAMUSCULAR SEE ADMIN INSTRUCTIONS
Status: DISCONTINUED | OUTPATIENT
Start: 2017-12-14 | End: 2017-12-14

## 2017-12-14 RX ORDER — PROPOFOL 10 MG/ML
INJECTION, EMULSION INTRAVENOUS PRN
Status: DISCONTINUED | OUTPATIENT
Start: 2017-12-14 | End: 2017-12-14

## 2017-12-14 RX ORDER — OLANZAPINE 5 MG/1
5 TABLET ORAL 2 TIMES DAILY
Status: DISCONTINUED | OUTPATIENT
Start: 2017-12-14 | End: 2017-12-14

## 2017-12-14 RX ORDER — LORAZEPAM 0.5 MG/1
.5-4 TABLET ORAL SEE ADMIN INSTRUCTIONS
Status: DISCONTINUED | OUTPATIENT
Start: 2017-12-14 | End: 2017-12-14

## 2017-12-14 RX ORDER — HALOPERIDOL 5 MG/ML
INJECTION INTRAMUSCULAR
Status: COMPLETED
Start: 2017-12-14 | End: 2017-12-14

## 2017-12-14 RX ORDER — FENTANYL CITRATE 50 UG/ML
25 INJECTION, SOLUTION INTRAMUSCULAR; INTRAVENOUS
Status: DISCONTINUED | OUTPATIENT
Start: 2017-12-14 | End: 2017-12-14

## 2017-12-14 RX ORDER — LORAZEPAM 1 MG/1
1-4 TABLET ORAL EVERY 30 MIN PRN
Status: DISCONTINUED | OUTPATIENT
Start: 2017-12-14 | End: 2017-12-14

## 2017-12-14 RX ORDER — DIPHENHYDRAMINE HYDROCHLORIDE 50 MG/ML
25 INJECTION INTRAMUSCULAR; INTRAVENOUS ONCE
Status: COMPLETED | OUTPATIENT
Start: 2017-12-14 | End: 2017-12-14

## 2017-12-14 RX ORDER — LEVOTHYROXINE SODIUM 50 UG/1
50 TABLET ORAL
Status: DISCONTINUED | OUTPATIENT
Start: 2017-12-14 | End: 2017-12-14

## 2017-12-14 RX ORDER — THIAMINE HYDROCHLORIDE 100 MG/ML
100 INJECTION, SOLUTION INTRAMUSCULAR; INTRAVENOUS DAILY
Status: DISCONTINUED | OUTPATIENT
Start: 2017-12-14 | End: 2017-12-14

## 2017-12-14 RX ORDER — LORAZEPAM 2 MG/ML
2-4 INJECTION INTRAMUSCULAR
Status: DISCONTINUED | OUTPATIENT
Start: 2017-12-14 | End: 2017-12-16

## 2017-12-14 RX ORDER — FENTANYL CITRATE 50 UG/ML
50 INJECTION, SOLUTION INTRAMUSCULAR; INTRAVENOUS
Status: DISCONTINUED | OUTPATIENT
Start: 2017-12-14 | End: 2017-12-14

## 2017-12-14 RX ORDER — LORAZEPAM 2 MG/ML
0.5 INJECTION INTRAMUSCULAR ONCE
Status: COMPLETED | OUTPATIENT
Start: 2017-12-14 | End: 2017-12-14

## 2017-12-14 RX ORDER — AMOXICILLIN 250 MG
1 CAPSULE ORAL 2 TIMES DAILY PRN
Status: DISCONTINUED | OUTPATIENT
Start: 2017-12-14 | End: 2017-12-14

## 2017-12-14 RX ORDER — HALOPERIDOL 5 MG/ML
2 INJECTION INTRAMUSCULAR ONCE
Status: COMPLETED | OUTPATIENT
Start: 2017-12-14 | End: 2017-12-14

## 2017-12-14 RX ORDER — OLANZAPINE 5 MG/1
5 TABLET, ORALLY DISINTEGRATING ORAL
Status: DISCONTINUED | OUTPATIENT
Start: 2017-12-14 | End: 2017-12-14

## 2017-12-14 RX ORDER — MIRTAZAPINE 15 MG/1
30 TABLET, FILM COATED ORAL AT BEDTIME
Status: DISCONTINUED | OUTPATIENT
Start: 2017-12-14 | End: 2017-12-14

## 2017-12-14 RX ORDER — DIAZEPAM 5 MG
5-20 TABLET ORAL SEE ADMIN INSTRUCTIONS
Status: DISCONTINUED | OUTPATIENT
Start: 2017-12-14 | End: 2017-12-17

## 2017-12-14 RX ORDER — METOPROLOL TARTRATE 1 MG/ML
INJECTION, SOLUTION INTRAVENOUS
Status: DISCONTINUED
Start: 2017-12-14 | End: 2017-12-16 | Stop reason: HOSPADM

## 2017-12-14 RX ORDER — ALBUTEROL SULFATE 0.83 MG/ML
2.5 SOLUTION RESPIRATORY (INHALATION)
Status: DISCONTINUED | OUTPATIENT
Start: 2017-12-14 | End: 2017-12-24

## 2017-12-14 RX ORDER — DEXMEDETOMIDINE HYDROCHLORIDE 4 UG/ML
.2-1.2 INJECTION, SOLUTION INTRAVENOUS CONTINUOUS
Status: DISCONTINUED | OUTPATIENT
Start: 2017-12-14 | End: 2017-12-14

## 2017-12-14 RX ORDER — CEFEPIME 1 G/50ML
2 INJECTION, SOLUTION INTRAVENOUS EVERY 8 HOURS
Status: DISCONTINUED | OUTPATIENT
Start: 2017-12-14 | End: 2017-12-14

## 2017-12-14 RX ORDER — AMOXICILLIN 250 MG
2 CAPSULE ORAL 2 TIMES DAILY PRN
Status: DISCONTINUED | OUTPATIENT
Start: 2017-12-14 | End: 2017-12-14

## 2017-12-14 RX ORDER — DIPHENHYDRAMINE HYDROCHLORIDE 50 MG/ML
INJECTION INTRAMUSCULAR; INTRAVENOUS
Status: COMPLETED
Start: 2017-12-14 | End: 2017-12-14

## 2017-12-14 RX ORDER — THIAMINE HYDROCHLORIDE 100 MG/ML
100 INJECTION, SOLUTION INTRAMUSCULAR; INTRAVENOUS ONCE
Status: COMPLETED | OUTPATIENT
Start: 2017-12-14 | End: 2017-12-14

## 2017-12-14 RX ORDER — FENTANYL CITRATE 50 UG/ML
INJECTION, SOLUTION INTRAMUSCULAR; INTRAVENOUS
Status: COMPLETED
Start: 2017-12-14 | End: 2017-12-14

## 2017-12-14 RX ORDER — SODIUM CHLORIDE 9 MG/ML
INJECTION, SOLUTION INTRAVENOUS
Status: COMPLETED
Start: 2017-12-14 | End: 2017-12-14

## 2017-12-14 RX ORDER — ALBUTEROL SULFATE 0.83 MG/ML
2.5 SOLUTION RESPIRATORY (INHALATION) ONCE
Status: COMPLETED | OUTPATIENT
Start: 2017-12-14 | End: 2017-12-14

## 2017-12-14 RX ORDER — ALBUTEROL SULFATE 0.83 MG/ML
2.5 SOLUTION RESPIRATORY (INHALATION) EVERY 4 HOURS
Status: DISCONTINUED | OUTPATIENT
Start: 2017-12-14 | End: 2017-12-18

## 2017-12-14 RX ORDER — FOLIC ACID 5 MG/ML
1 INJECTION, SOLUTION INTRAMUSCULAR; INTRAVENOUS; SUBCUTANEOUS DAILY
Status: DISCONTINUED | OUTPATIENT
Start: 2017-12-14 | End: 2017-12-26

## 2017-12-14 RX ORDER — CALCIUM CHLORIDE 100 MG/ML
1 INJECTION INTRAVENOUS; INTRAVENTRICULAR ONCE
Status: DISCONTINUED | OUTPATIENT
Start: 2017-12-14 | End: 2017-12-14

## 2017-12-14 RX ORDER — DEXMEDETOMIDINE HYDROCHLORIDE 4 UG/ML
0.2-0.7 INJECTION, SOLUTION INTRAVENOUS CONTINUOUS
Status: DISCONTINUED | OUTPATIENT
Start: 2017-12-14 | End: 2017-12-14

## 2017-12-14 RX ORDER — DEXTROSE 20 G/100ML
INJECTION, SOLUTION INTRAVENOUS CONTINUOUS
Status: DISCONTINUED | OUTPATIENT
Start: 2017-12-14 | End: 2017-12-14

## 2017-12-14 RX ORDER — DIAZEPAM 10 MG/2ML
5-20 INJECTION, SOLUTION INTRAMUSCULAR; INTRAVENOUS SEE ADMIN INSTRUCTIONS
Status: DISCONTINUED | OUTPATIENT
Start: 2017-12-14 | End: 2017-12-17

## 2017-12-14 RX ORDER — FENTANYL CITRATE 50 UG/ML
100 INJECTION, SOLUTION INTRAMUSCULAR; INTRAVENOUS ONCE
Status: COMPLETED | OUTPATIENT
Start: 2017-12-14 | End: 2017-12-14

## 2017-12-14 RX ORDER — HALOPERIDOL 5 MG/ML
INJECTION INTRAMUSCULAR
Status: DISCONTINUED
Start: 2017-12-14 | End: 2017-12-14 | Stop reason: WASHOUT

## 2017-12-14 RX ADMIN — EPINEPHRINE 0.3 MG: 1 INJECTION INTRAMUSCULAR; INTRAVENOUS; SUBCUTANEOUS at 11:45

## 2017-12-14 RX ADMIN — ALBUTEROL SULFATE 2.5 MG: 2.5 SOLUTION RESPIRATORY (INHALATION) at 13:07

## 2017-12-14 RX ADMIN — POTASSIUM PHOSPHATE, MONOBASIC AND POTASSIUM PHOSPHATE, DIBASIC 25 MMOL: 224; 236 INJECTION, SOLUTION INTRAVENOUS at 03:35

## 2017-12-14 RX ADMIN — DIPHENHYDRAMINE HYDROCHLORIDE 25 MG: 50 INJECTION, SOLUTION INTRAMUSCULAR; INTRAVENOUS at 07:30

## 2017-12-14 RX ADMIN — LORAZEPAM 0.5 MG: 2 INJECTION INTRAMUSCULAR; INTRAVENOUS at 05:14

## 2017-12-14 RX ADMIN — SODIUM BICARBONATE 100 MEQ: 84 INJECTION INTRAVENOUS at 17:01

## 2017-12-14 RX ADMIN — DEXTROSE MONOHYDRATE 3 MCG/KG/MIN: 5 INJECTION, SOLUTION INTRAVENOUS at 23:10

## 2017-12-14 RX ADMIN — ACETYLCYSTEINE 6.25 MG/KG/HR: 200 INJECTION, SOLUTION INTRAVENOUS at 00:03

## 2017-12-14 RX ADMIN — LORAZEPAM 4 MG: 2 INJECTION INTRAMUSCULAR; INTRAVENOUS at 16:37

## 2017-12-14 RX ADMIN — VASOPRESSIN 2.4 UNITS/HR: 20 INJECTION, SOLUTION INTRAMUSCULAR; SUBCUTANEOUS at 10:30

## 2017-12-14 RX ADMIN — CALCIUM CHLORIDE 2 G/HR: 100 INJECTION, SOLUTION INTRAVENOUS at 21:14

## 2017-12-14 RX ADMIN — ASCORBIC ACID 1500 MG: 500 INJECTION, SOLUTION INTRAMUSCULAR; INTRAVENOUS; SUBCUTANEOUS at 12:42

## 2017-12-14 RX ADMIN — SODIUM CHLORIDE, POTASSIUM CHLORIDE, SODIUM LACTATE AND CALCIUM CHLORIDE 500 ML: 600; 310; 30; 20 INJECTION, SOLUTION INTRAVENOUS at 05:25

## 2017-12-14 RX ADMIN — HYDROCORTISONE SODIUM SUCCINATE 50 MG: 100 INJECTION, POWDER, FOR SOLUTION INTRAMUSCULAR; INTRAVENOUS at 18:54

## 2017-12-14 RX ADMIN — ALBUTEROL SULFATE 2.5 MG: 2.5 SOLUTION RESPIRATORY (INHALATION) at 16:17

## 2017-12-14 RX ADMIN — ASCORBIC ACID 1500 MG: 500 INJECTION, SOLUTION INTRAMUSCULAR; INTRAVENOUS; SUBCUTANEOUS at 17:50

## 2017-12-14 RX ADMIN — FENTANYL CITRATE 100 MCG: 50 INJECTION, SOLUTION INTRAMUSCULAR; INTRAVENOUS at 01:03

## 2017-12-14 RX ADMIN — HALOPERIDOL 2 MG: 5 INJECTION INTRAMUSCULAR at 07:04

## 2017-12-14 RX ADMIN — LORAZEPAM 8 MG/HR: 2 INJECTION INTRAMUSCULAR; INTRAVENOUS at 22:45

## 2017-12-14 RX ADMIN — POTASSIUM CHLORIDE 20 MEQ: 400 INJECTION, SOLUTION INTRAVENOUS at 03:34

## 2017-12-14 RX ADMIN — DIPHENHYDRAMINE HYDROCHLORIDE 25 MG: 50 INJECTION INTRAMUSCULAR; INTRAVENOUS at 07:30

## 2017-12-14 RX ADMIN — THIAMINE HYDROCHLORIDE 100 MG: 100 INJECTION, SOLUTION INTRAMUSCULAR; INTRAVENOUS at 09:36

## 2017-12-14 RX ADMIN — DIAZEPAM 10 MG: 5 INJECTION, SOLUTION INTRAMUSCULAR; INTRAVENOUS at 06:51

## 2017-12-14 RX ADMIN — SODIUM CHLORIDE 5 UNITS/KG/HR: 9 INJECTION, SOLUTION INTRAVENOUS at 01:30

## 2017-12-14 RX ADMIN — DEXTROSE MONOHYDRATE: 70 INJECTION, SOLUTION INTRAVENOUS at 04:32

## 2017-12-14 RX ADMIN — MAGNESIUM SULFATE HEPTAHYDRATE 3 G: 500 INJECTION, SOLUTION INTRAMUSCULAR; INTRAVENOUS at 05:52

## 2017-12-14 RX ADMIN — HYDROCORTISONE SODIUM SUCCINATE 50 MG: 100 INJECTION, POWDER, FOR SOLUTION INTRAMUSCULAR; INTRAVENOUS at 14:05

## 2017-12-14 RX ADMIN — NOREPINEPHRINE BITARTRATE 0.6 MCG/KG/MIN: 1 INJECTION INTRAVENOUS at 16:46

## 2017-12-14 RX ADMIN — ASCORBIC ACID 1500 MG: 500 INJECTION, SOLUTION INTRAMUSCULAR; INTRAVENOUS; SUBCUTANEOUS at 23:20

## 2017-12-14 RX ADMIN — VASOPRESSIN 2.4 UNITS/HR: 20 INJECTION, SOLUTION INTRAMUSCULAR; SUBCUTANEOUS at 19:44

## 2017-12-14 RX ADMIN — LORAZEPAM 4 MG: 2 INJECTION INTRAMUSCULAR; INTRAVENOUS at 21:29

## 2017-12-14 RX ADMIN — SODIUM CHLORIDE 10 UNITS/KG/HR: 0.9 INJECTION, SOLUTION INTRAVENOUS at 04:32

## 2017-12-14 RX ADMIN — PROPOFOL 70 MG: 10 INJECTION, EMULSION INTRAVENOUS at 07:33

## 2017-12-14 RX ADMIN — NOREPINEPHRINE BITARTRATE 0.23 MCG/KG/MIN: 1 INJECTION INTRAVENOUS at 01:29

## 2017-12-14 RX ADMIN — FUROSEMIDE 10 MG/HR: 10 INJECTION, SOLUTION INTRAMUSCULAR; INTRAVENOUS at 21:50

## 2017-12-14 RX ADMIN — SODIUM CHLORIDE 500 ML: 9 INJECTION, SOLUTION INTRAVENOUS at 00:47

## 2017-12-14 RX ADMIN — FUROSEMIDE 20 MG: 10 INJECTION, SOLUTION INTRAVENOUS at 19:15

## 2017-12-14 RX ADMIN — SODIUM BICARBONATE 50 MEQ: 84 INJECTION INTRAVENOUS at 09:23

## 2017-12-14 RX ADMIN — Medication 50 MCG/HR: at 09:24

## 2017-12-14 RX ADMIN — PANTOPRAZOLE SODIUM 40 MG: 40 INJECTION, POWDER, FOR SOLUTION INTRAVENOUS at 14:05

## 2017-12-14 RX ADMIN — SODIUM BICARBONATE 20 MEQ/HR: 84 INJECTION, SOLUTION INTRAVENOUS at 22:34

## 2017-12-14 RX ADMIN — Medication 9.35 MG: at 23:17

## 2017-12-14 RX ADMIN — CALCIUM CHLORIDE 1 G: 100 INJECTION INTRAVENOUS; INTRAVENTRICULAR at 17:49

## 2017-12-14 RX ADMIN — ALBUTEROL SULFATE 2.5 MG: 2.5 SOLUTION RESPIRATORY (INHALATION) at 20:13

## 2017-12-14 RX ADMIN — CEFEPIME 2 G: 1 INJECTION, SOLUTION INTRAVENOUS at 14:11

## 2017-12-14 RX ADMIN — ACETYLCYSTEINE 6.25 MG/KG/HR: 200 INJECTION, SOLUTION INTRAVENOUS at 01:29

## 2017-12-14 RX ADMIN — ROCURONIUM BROMIDE 50 MG: 10 INJECTION INTRAVENOUS at 07:33

## 2017-12-14 RX ADMIN — DIAZEPAM 10 MG: 5 INJECTION, SOLUTION INTRAMUSCULAR; INTRAVENOUS at 06:32

## 2017-12-14 RX ADMIN — SODIUM BICARBONATE 50 MEQ: 84 INJECTION INTRAVENOUS at 11:16

## 2017-12-14 RX ADMIN — DEXMEDETOMIDINE HYDROCHLORIDE 1.2 MCG/KG/HR: 4 INJECTION, SOLUTION INTRAVENOUS at 08:00

## 2017-12-14 RX ADMIN — SODIUM CHLORIDE 10 UNITS/KG/HR: 0.9 INJECTION, SOLUTION INTRAVENOUS at 20:33

## 2017-12-14 RX ADMIN — CALCIUM GLUCONATE 3 G: 94 INJECTION, SOLUTION INTRAVENOUS at 11:20

## 2017-12-14 RX ADMIN — MIDAZOLAM 1 MG: 1 INJECTION INTRAMUSCULAR; INTRAVENOUS at 03:34

## 2017-12-14 RX ADMIN — LORAZEPAM 4 MG: 2 INJECTION INTRAMUSCULAR; INTRAVENOUS at 19:35

## 2017-12-14 RX ADMIN — SODIUM BICARBONATE 100 MEQ: 84 INJECTION, SOLUTION INTRAVENOUS at 13:19

## 2017-12-14 RX ADMIN — LORAZEPAM 6 MG/HR: 2 INJECTION INTRAMUSCULAR; INTRAVENOUS at 17:19

## 2017-12-14 RX ADMIN — POTASSIUM CHLORIDE 20 MEQ: 29.8 INJECTION, SOLUTION INTRAVENOUS at 01:04

## 2017-12-14 RX ADMIN — HALOPERIDOL LACTATE 2 MG: 5 INJECTION, SOLUTION INTRAMUSCULAR at 07:04

## 2017-12-14 RX ADMIN — LORAZEPAM 4 MG: 2 INJECTION INTRAMUSCULAR; INTRAVENOUS at 13:00

## 2017-12-14 RX ADMIN — SODIUM CHLORIDE, POTASSIUM CHLORIDE, SODIUM LACTATE AND CALCIUM CHLORIDE 1000 ML: 600; 310; 30; 20 INJECTION, SOLUTION INTRAVENOUS at 08:30

## 2017-12-14 RX ADMIN — Medication 100 MEQ: at 17:01

## 2017-12-14 RX ADMIN — SODIUM CHLORIDE, POTASSIUM CHLORIDE, SODIUM LACTATE AND CALCIUM CHLORIDE 500 ML: 600; 310; 30; 20 INJECTION, SOLUTION INTRAVENOUS at 06:15

## 2017-12-14 RX ADMIN — DIAZEPAM 10 MG: 5 INJECTION, SOLUTION INTRAMUSCULAR; INTRAVENOUS at 06:10

## 2017-12-14 RX ADMIN — FENTANYL CITRATE 25 MCG: 50 INJECTION, SOLUTION INTRAMUSCULAR; INTRAVENOUS at 02:55

## 2017-12-14 RX ADMIN — SODIUM CHLORIDE 7 UNITS/KG/HR: 9 INJECTION, SOLUTION INTRAVENOUS at 02:25

## 2017-12-14 RX ADMIN — FOLIC ACID 1 MG: 5 INJECTION, SOLUTION INTRAMUSCULAR; INTRAVENOUS; SUBCUTANEOUS at 12:42

## 2017-12-14 RX ADMIN — ALBUTEROL SULFATE 2.5 MG: 2.5 SOLUTION RESPIRATORY (INHALATION) at 07:49

## 2017-12-14 RX ADMIN — THIAMINE HYDROCHLORIDE 200 MG: 100 INJECTION, SOLUTION INTRAMUSCULAR; INTRAVENOUS at 20:18

## 2017-12-14 RX ADMIN — VANCOMYCIN HYDROCHLORIDE 1500 MG: 10 INJECTION, POWDER, LYOPHILIZED, FOR SOLUTION INTRAVENOUS at 15:31

## 2017-12-14 RX ADMIN — DEXTROSE: 20 INJECTION, SOLUTION INTRAVENOUS at 00:27

## 2017-12-14 RX ADMIN — SODIUM BICARBONATE 20 MEQ/HR: 84 INJECTION, SOLUTION INTRAVENOUS at 18:18

## 2017-12-14 RX ADMIN — NOREPINEPHRINE BITARTRATE 0.03 MCG/KG/MIN: 1 INJECTION INTRAVENOUS at 09:11

## 2017-12-14 RX ADMIN — THIAMINE HYDROCHLORIDE 100 MG: 100 INJECTION, SOLUTION INTRAMUSCULAR; INTRAVENOUS at 14:05

## 2017-12-14 RX ADMIN — POTASSIUM CHLORIDE 20 MEQ: 29.8 INJECTION, SOLUTION INTRAVENOUS at 02:01

## 2017-12-14 RX ADMIN — LORAZEPAM 3 MG/HR: 2 INJECTION INTRAMUSCULAR; INTRAVENOUS at 11:20

## 2017-12-14 RX ADMIN — CALCIUM GLUCONATE 3 G: 94 INJECTION, SOLUTION INTRAVENOUS at 13:59

## 2017-12-14 ASSESSMENT — PAIN DESCRIPTION - DESCRIPTORS: DESCRIPTORS: CONSTANT;ACHING
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
## 2017-12-14 NOTE — PLAN OF CARE
Problem: Patient Care Overview  Goal: Plan of Care/Patient Progress Review  Outcome: No Change  D: s/p Amlodipine, tylenol and alcohol suicidal attempt.   I/A: Alert,  extremely restless and agitated. Pt crawling out of bed throughout night, needed re-direction. MD at bedside often throughout night to assess agitation/ RN concerns.  MSSA started scored 24-27. PRN valium administered per protocol. Sinus rhythm 60-100s. Insulin gtt 10units/kg/hr, blood glucose stable 150-230s. D50 gtt at 50ml, Acetadote 32.4ml /hr. Levo titrated off. Electrolytes replaced: K, Mg, Phos (see flow sheets/ MAR). Extubated at 0415am per attending order. Currently on 4L NC  Complaining of pain in abdominal region. Abdominal x-ray completed.   P: Continue to monitor and notify care team of any changes.       At 0700 pt had to get re-intubated. Pt became increasingly more agitated. Pt was placed on 6L NC. Pt became unresponsive, pulses palpable.
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
## 2017-12-14 NOTE — PROCEDURES
"Procedure/Surgery Information   [facility]    Bedside Procedure Note  Date of Service (when I performed the procedure): 12/13/2017    Ana Laura Wharton is a 47 year old female patient.  1. Calcium channel blocker overdose, undetermined intent, initial encounter    2. Acetaminophen overdose of undetermined intent, initial encounter      Past Medical History:   Diagnosis Date     Anxiety      Bipolar disorder (H)      C. difficile colitis      Depressive disorder      Essential hypertension 7/8/2015     Gastro-oesophageal reflux disease      Hypothyroidism 4/1/2015     Migraine      Spontaneous pneumothorax     x3, resolved w/ pleurodesis      Suicide attempt      Temp: 97.9  F (36.6  C) Temp src: Temporal BP: 104/56 Pulse: 90 Heart Rate: 76 Resp: 15 SpO2: 100 % O2 Device: Mechanical Ventilator      Insert arterial line  Date/Time: 12/13/2017 9:27 PM  Performed by: KERVIN ANNE  Authorized by: KERVIN ANNE   Consent: The procedure was performed in an emergent situation. Verbal consent not obtained. Written consent not obtained.  Patient understanding: patient does not state understanding of the procedure being performed  Patient consent: the patient's understanding of the procedure does not match consent given  Procedure consent: procedure consent does not match procedure scheduled  Relevant documents: relevant documents not present or verified  Test results: test results not available  Site marked: the operative site was not marked  Imaging studies: imaging studies not available  Patient identity confirmed: arm band  Time out: Immediately prior to procedure a \"time out\" was called to verify the correct patient, procedure, equipment, support staff and site/side marked as required.  Preparation: Patient was prepped and draped in the usual sterile fashion.  Indications: multiple ABGs, respiratory failure and hemodynamic monitoring  Location: right brachial  Anesthesia: local " infiltration    Anesthesia:  Local Anesthetic: lidocaine 1% without epinephrine    Sedation:  Patient sedated: no  Needle gauge: 18  Seldinger technique: Seldinger technique used  Number of attempts: 3  Post-procedure: dressing applied  Patient tolerance: Patient tolerated the procedure well with no immediate complications  Comments: There was no family available, and procedure was done as an emergency.           Nomi Jeffrey
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
## 2017-12-14 NOTE — CONSULTS
Cardiology Inpatient Consultation  December 14, 2017    Reason for Consult:  A cardiology consult was requested by Dr. Calabrese from the MICU service to provide clinical guidance regarding amlodipine overdose and vasoplegia.    HPI:   Ana Laura Wharton is a 47 year old female with PMH bipolar disorder and depression, polysubstance abuse, history of previous suicide attempts, hypothyroidism, HTN, HLD. Patient is transferred from [facility] after suicide attempt. She was found by EMS with 2 bottles of amlodipine emptied, vodka, and nyquil. She was hypotensive and intubated. She started on levophed and given insulin, glucagon, calcium, and NAC prior to transfer    Cardiology is consulted with question of whether more invasive monitoring is required such as CVP monitoring. Calculated OLESYA CI 3.3, CO 5.5. MAP measurements are at goal >65, patient is maintaining  mmHg on levophed and vasopressin. She is being treated with multiple ongoing drips appropriate for her ingestion. HR is NSR. TTE with very hyperdynamic LV EF but otherwise normal.     Review of Systems:    Intubated, sedated    PMH:  Past Medical History:   Diagnosis Date     Anxiety      Bipolar disorder (H)      C. difficile colitis      Depressive disorder      Essential hypertension 7/8/2015     Gastro-oesophageal reflux disease      Hypothyroidism 4/1/2015     Migraine      Spontaneous pneumothorax     x3, resolved w/ pleurodesis      Suicide attempt      Active Problems:  Patient Active Problem List    Diagnosis Date Noted     Drug overdose 12/13/2017     Priority: Medium     Clostridium difficile colitis 03/10/2017     Priority: Medium     Acute cystitis 01/25/2017     Priority: Medium     UTI (urinary tract infection) 01/24/2017     Priority: Medium     Left knee pain 10/26/2016     Priority: Medium     Aftercare following surgery of the musculoskeletal system 10/26/2016     Priority: Medium     Personality disorder 09/14/2015     Priority: Medium      Acute renal failure (H) 08/03/2015     Priority: Medium     Acidosis 08/03/2015     Priority: Medium     Poisoning by 4-aminophenol derivatives, undetermined intent 08/03/2015     Priority: Medium     Abnormal finding of blood chemistry 07/08/2015     Priority: Medium     Essential hypertension 07/08/2015     Priority: Medium     Bipolar I disorder (H) 06/05/2015     Priority: Medium     Screening for malignant neoplasm of cervix 05/05/2015     Priority: Medium     Overview:   2009 NILM  2010 NILM  2011 NILM  2012 NILM  2015 NILM, no ECC, HPV negative    44 yr  Plan:  Cotesting 4/2020       Suicidal ideations 04/10/2015     Priority: Medium     Hypothyroidism 04/01/2015     Priority: Medium     Depression 03/28/2015     Priority: Medium     Mood disorder (H) 03/28/2015     Priority: Medium     Overdose 03/27/2015     Priority: Medium     Bipolar disorder current episode depressed (H) 07/10/2014     Priority: Medium     Low back pain 05/21/2014     Priority: Medium     Suicide ideation 04/04/2014     Priority: Medium     Myopia 06/14/2013     Priority: Medium     Anxiety disorder 01/10/2013     Priority: Medium     Depressed bipolar I disorder in full remission (H) 12/18/2012     Priority: Medium     Problem list name updated by automated process. Provider to review       Impulse control disorder 06/21/2011     Priority: Medium     Orofacial dyskinesia 10/19/2010     Priority: Medium     Alternating exotropia 09/26/2010     Priority: Medium     Tear film insufficiency 09/26/2010     Priority: Medium     Dysfunctions associated with sleep stages or arousal from sleep 03/09/2010     Priority: Medium     Opioid dependence (H) 02/11/2010     Priority: Medium     Overview:   T 3 abuse. 2/11/10 document       Esophageal reflux 03/07/2008     Priority: Medium     Pure hypercholesterolemia 03/03/2006     Priority: Medium     Intractable migraine 03/03/2005     Priority: Medium     Overview:   prophylasix with candesartan and  pregabalin.  Acute treatment: Relpax, subsequent Toradol intramuscular 30 milligrams after six hours.       Anxiety state 03/19/2003     Priority: Medium     Other bipolar disorders 03/19/2003     Priority: Medium     Social History:  Social History   Substance Use Topics     Smoking status: Current Some Day Smoker     Types: Cigarettes     Last attempt to quit: 1/1/1997     Smokeless tobacco: Never Used     Alcohol use No     Family History:  Family History   Problem Relation Age of Onset     Depression Mother      Lipids Father      hyperlipidemia     Macular Degeneration Father        Medications:    thiamine  100 mg Intravenous Daily     folic acid  1 mg Intravenous Daily     diazepam  5-20 mg Intravenous See Admin Instructions    Or     diazepam  5-20 mg Oral See Admin Instructions     albuterol  2.5 mg Nebulization Q4H     EPINEPHrine  0.3 mg Intramuscular Once     ascorbic acid intermittent infusion  1,500 mg Intravenous Q24H     EPINEPHrine         ceFEPIme (MAXIPIME) IV  2 g Intravenous Q8H         acetylcysteine (ACETADOTE) 6000 mg/500 mL infusion (third dose-elevated AST) 6.25 mg/kg/hr (12/14/17 0129)     norepinephrine 0.06 mcg/kg/min (12/14/17 1030)     dextrose 50%  Continuous Infusion 50 mL/hr at 12/14/17 0432     insulin regluar 10 units/mL 100 mL INFUSION (CCB or beta-blocker overdose) 15 Units/kg/hr (12/14/17 1100)     dexmedetomidine 1.02 mcg/kg/hr (12/14/17 0800)     fentaNYL 50 mcg/hr (12/14/17 0924)     vasopressin (PITRESSIN) infusion ADULT (40 mL) 2.4 Units/hr (12/14/17 1030)     LORazepam (ATIVAN) infusion ADULT 3 mg/hr (12/14/17 1120)     phenylephrine IV infusion ADULT       dextrose Stopped (12/14/17 0434)       Physical Exam:  Temp:  [95.2  F (35.1  C)-98.6  F (37  C)] 96.7  F (35.9  C)  Pulse:  [90] 90  Heart Rate:  [] 70  Resp:  [0-39] 23  BP: ()/(44-63) 106/55  MAP:  [52 mmHg-266 mmHg] 65 mmHg  Arterial Line BP: ()/() 109/51  FiO2 (%):  [30 %-100 %] 100  %  SpO2:  [59 %-100 %] 99 %    Intake/Output Summary (Last 24 hours) at 12/14/17 1145  Last data filed at 12/14/17 0555   Gross per 24 hour   Intake           938.89 ml   Output              405 ml   Net           533.89 ml     GEN: intubated/sedated female  HEENT: no icterus  CV: RRR, brisk s1/s2, no murmurs/rubs/s3/s4, no heave. JVP difficult to assess  CHEST: ventilated  ABD: Distended, normal active bowel sounds  EXTR: pulses intact, No clubbing, cyanosis or edema.   NEURO: intubated/sedated    Diagnostics:  All labs and imaging were reviewed, of note:    All laboratory data reviewed  Lab Results   Component Value Date    PH 7.19 (LL) 12/14/2017    PO2 88 12/14/2017    PCO2 43 12/14/2017    HCO3 16 (L) 12/14/2017          Lab Results   Component Value Date     12/14/2017    Lab Results   Component Value Date    CHLORIDE 115 12/14/2017    Lab Results   Component Value Date    BUN 17 12/14/2017      Lab Results   Component Value Date    POTASSIUM 3.5 12/14/2017    Lab Results   Component Value Date    CO2 15 12/14/2017    Lab Results   Component Value Date    CR 1.30 12/14/2017        Lab Results   Component Value Date    WBC 36.5 (H) 12/14/2017    HGB 9.9 (L) 12/14/2017    HCT 30.6 (L) 12/14/2017    MCV 94 12/14/2017     12/14/2017       Lab Results   Component Value Date    TROPI <0.015 12/13/2017       EKG:       Transthoracic echocardiogram:   Normal left ventricular size and thickness with hyperdynamic function and  complete obliteration of the LV cavity during systole. EF >70%. This causes a  minimal cavitary gradient of 12 mmHg.  Normal right ventricular size and function.  No pericardial effusion.  No prior study for comparison.    Assessment and Recommendation:  Ana Laura Wharton is a 47 year old female with PMH bipolar disorder and depression, polysubstance abuse, history of previous suicide attempts, hypothyroidism, HTN, HLD. Patient is transferred from [facility] after suicide attempt. She was  found by EMS with 2 bottles of amlodipine emptied, vodka, and nyquil. She was hypotensive and intubated. She started on levophed and given insulin, glucagon, calcium, and NAC prior to transfer    At present, invasive CVP measurement would not contribute to her care. She is hyperdynamic and in a high output state with low SVR which is apparent from other points of data and clinical information. She is on appropriate medication per the MICU team cares.     -If she suffers a clinical status change due to ongoing veno-plegia, and there is question of her hemodynamics, then it might be reasonable to establish more invasive measurements such as with a swan-boom  -She has had copious IVF resuscitation including prior to arrival to [facility]. If she remains oliguric consider CRRT but this is also per ICU team .   -No immediate intervention from our perspective       I have discussed the above with Dr. Kapadia.    Thank you for consulting the cardiovascular services at the [facility]. Please do not hesitate to call us with any questions.     Nomi Snyder PA-C  [facility] Cardiology Consult Team  Pager [phone] or [phone]

## 2017-12-14 NOTE — PLAN OF CARE
Problem: Restraint, Nonbehavioral (Nonviolent)  Goal: Rationale and Justification  Outcome: No Change  Right wrist, Left wrist and soft restraints restraints initiated on patient on 12/13/2017 at 19:00 PM    Clinical Justification: Pulling lines, pulling tubes, and pulling equipment  Less Restrictive Alternative: Repositioning, Disguise equipment, Pain management, Reorientation  Attending Physician Notified: Yes,     Order received: Yes         Criteria explained to Patient  Patient's Response: No evidence of learning  Restraint care Plan initiated: Yes    Natasha Tuttle

## 2017-12-14 NOTE — CONSULTS
Critical Care  Note      12/13/2017    Name: Ana Laura Wharton MRN#: [id number]   Age: 47 year old YOB: 1970     [facility] Day# 0  ICU DAY #    MV DAY #             Problem List:   Active Problems:    Drug overdose  1. Overdose as suicide attempt  2. Tylenol OD- on IV mucomyst and will monitor LFT's  3. amlodapine OD- On high dose insulin and will titrate to keep MAP's >/=65. We will counterbalance  With D10 and now changing to D50, checking glucose levels  Every 30 minutes to adust dextrose to keep glucose 100-200.   4. Shock- due to #3- on IV insulin and will continue levophed though doubt effective at this time. Will monitor serial lactates. Will also monitor serial ionized calcium  Levels to keep at high therapeutic/high normal  5. Depression and history of bipolar  6. GERD  7. History C. Diff colitis   8 . Acute respiratory failure; on full vent support at present.   I discussed case with poison control center that recommended titrating up to 10 units/kg/hr insulin to keep BP's up (currently at 5). They also recommended high concentration of glucose to minimize amount of volume given to the degree possible. These OD cases are at risk for volume overload. If they develop severe cardiac failure, they may require AV ecmo.     Discussed with Dr. Serna and am grateful for his support in accepting the care of this patient. I spent 65 minutes of critical care time with this patient,  Not including time spent placing arterial line.            Summary/Hospital Course:     Please see H and P      Assessment and plan :     ICU Prophylaxis:   1. DVT: Hep Subq/ LMWH/mechanical  2. VAP: HOB 30 degrees, chlorhexidine rinse  3. Stress Ulcer: PPI/H2 blocker  4. Restraints: Nonviolent soft two point restraints required and necessary for patient safety and continued cares and good effect as patient continues to pull at necessary lines, tubes despite education and distraction. Will readdress daily.   5. Wound care  -  "  6. Feeding - deferred   7. Family Update: no family at bedside; no phone number available   8. Disposition - To [facility] for further care and possible AV ecmo if failure worsens.           Medical History:     Past Medical History:   Diagnosis Date     Anxiety      Bipolar disorder (H)      C. difficile colitis      Depressive disorder      Essential hypertension 7/8/2015     Gastro-oesophageal reflux disease      Hypothyroidism 4/1/2015     Migraine      Spontaneous pneumothorax     x3, resolved w/ pleurodesis      Suicide attempt      Past Surgical History:   Procedure Laterality Date     ARTHROSCOPY KNEE      L knee     CERVIX SURGERY      for pre-cancerous changes     left knee surgery       ORTHOPEDIC SURGERY      L shoulder     THORACIC SURGERY      for pneumothorax, pleurodesis and lobe resection     Social History     Social History     Marital status:      Spouse name: N/A     Number of children: N/A     Years of education: N/A     Occupational History     Not on file.     Social History Main Topics     Smoking status: Current Some Day Smoker     Types: Cigarettes     Last attempt to quit: 1/1/1997     Smokeless tobacco: Never Used     Alcohol use No     Drug use: No     Sexual activity: Yes     Partners: Male     Birth control/ protection: OCP     Other Topics Concern     Not on file     Social History Narrative        Allergies   Allergen Reactions     Amoxicillin-Pot Clavulanate      PN: LW Reaction: Itching, Pruritis     Azithromycin      Other reaction(s): Dermatitis     Cephalexin      PN: LW Reaction: Itching, Pruritis     Ciprofloxacin Other (See Comments)     Joint pain cdiff     Compazine [Prochlorperazine] Other (See Comments)     dystonia     Metoclopramide Other (See Comments)     \"I feel like I am crawling out of my skin\"     Minocycline Nausea and Vomiting     PN: DIARRHEA, VOMITING, AND STOMACH CRAMPS     Penicillins Itching     Reglan [Metoclopramide Hcl] Other (See Comments)     " "Sensation of \"crawling out of skin\"     Restoril [Temazepam]      Thorazine [Chlorpromazine] Other (See Comments)     dystonia     Abilify [Aripiprazole] Rash     Lamotrigine Rash     Severe drug rash - contraindication to receiving again. Skin peeling.      Sulfa Drugs Rash              [facility] Medications:       insulin (regular)  1 Units/kg Subcutaneous Once     pantoprazole  40 mg Intravenous QAM AC     potassium chloride  20 mEq Intravenous Once     calcium gluconate  1 g Intravenous Once       norepinephrine 0.2 mcg/kg/min (12/13/17 2123)     IV infusion builder WITH additives 200 mL/hr at 12/13/17 2050     insulin regluar 10 units/mL 100 mL INFUSION (CCB or beta-blocker overdose) Stopped (12/13/17 1714)     dextrose Stopped (12/13/17 1714)     - MEDICATION INSTRUCTIONS -       dexmedetomidine 0.7 mcg/kg/hr (12/13/17 1921)     acetylcysteine (ACEDOTE) infusion *loading dose*       acetylcysteine (ACETADOTE) infusion *second dose*       acetylcysteine (ACEDOTE) infusion *third dose* 5.22 g (12/13/17 2107)     insulin regluar 10 units/mL 100 mL INFUSION (CCB or beta-blocker overdose) 5 Units/kg/hr (12/13/17 2122)     dextrose       midazolam 6 mg/hr (12/13/17 2008)     fentaNYL 50 mcg/hr (12/13/17 2008)     dextrose 50%  Continuous Infusion          Home Meds    No current facility-administered medications on file prior to encounter.   Current Outpatient Prescriptions on File Prior to Encounter:  mirtazapine (REMERON) 15 MG tablet Start at 15 mg qhs.  After 5 days, increase to 30 mg qhs.   OLANZapine (ZYPREXA) 5 MG tablet Take 1 tablet (5 mg) by mouth 2 times daily May take extra dose prn paranoia.   clonazePAM (KLONOPIN) 1 MG tablet Take 1 tablet (1 mg) by mouth 3 times daily as needed for anxiety   hydrOXYzine (ATARAX) 50 MG tablet Take 1 tablet (50 mg) by mouth 3 times daily as needed for itching   oxyCODONE-acetaminophen (PERCOCET) 5-325 MG per tablet Take 1-2 tablets by mouth every 6 hours as needed for " moderate to severe pain   vancomycin (VANOCIN) 50 mg/mL LIQD solution Take by mouth as directed:  125mg QID x 10 days, then 125mg TID x 7 days, then 125mg BID x 7 days, then 125mg daily x 7 days.   Eletriptan Hydrobromide (RELPAX PO) Take 20 mg by mouth at onset of headache for migraine (May repeat in 2 hours if needed)   HYDROXYZINE HCL PO Take 100 mg by mouth daily as needed   IBUPROFEN PO Take 400-600 mg by mouth every 6 hours as needed for moderate pain   Levothyroxine Sodium (SYNTHROID PO) Take 50 mcg by mouth daily   desogestrel-ethinyl estradiol (APRI) 0.15-30 MG-MCG per tablet Take 1 tablet by mouth daily   triamcinolone (KENALOG) 0.1 % cream Apply topically 2 times daily as needed for irritation   Ondansetron (ZOFRAN ODT PO) Take 4 mg by mouth every 8 hours as needed for nausea   clindamycin (CLEOCIN T) 1 % lotion Apply topically every morning   TIZANIDINE HCL PO Take 4 mg by mouth At Bedtime   Pantoprazole Sodium (PROTONIX PO) Take 40 mg by mouth every morning (before breakfast)    Ketorolac Tromethamine (TORADOL IM) Inject 30 mg into the muscle daily as needed Per pt for migraines   Acetaminophen (TYLENOL 8 HOUR PO) Take 1,000 mg by mouth every 6 hours as needed (pain)               Physical Examination:   Temp:  [95.2  F (35.1  C)-97.9  F (36.6  C)] 97.9  F (36.6  C)  Pulse:  [] 90  Heart Rate:  [] 76  Resp:  [8-39] 15  BP: ()/(46-63) 104/56  FiO2 (%):  [30 %-50 %] 30 %  SpO2:  [86 %-100 %] 100 %  No intake or output data in the 24 hours ending 12/13/17 2132  Wt Readings from Last 4 Encounters:   12/13/17 52.2 kg (115 lb)   10/02/17 45.4 kg (100 lb)   03/11/17 54.7 kg (120 lb 8 oz)   03/09/17 52.6 kg (116 lb)     BP - Mean:  [63-79] 69  Ventilation Mode: CMV/AC  FiO2 (%): 30 %  Rate Set (breaths/minute): 16 breaths/min  Tidal Volume Set (mL): 450 mL  PEEP (cm H2O): 5 cmH2O  Oxygen Concentration (%): 30 %  Resp: 15    Recent Labs  Lab 12/13/17  1445   PH 7.22*   PCO2 41   PO2 193*   HCO3  17*   O2PER Ventilator       GEN: no acute distress  HEENT: supple   PULM: unlabored synchronous with vent, clear anteriorly    CV/COR: RRR S1S2 no gallop,  No rub, no murmur; distant heart sounds  ABD: soft nontender, hypoactive bowel sounds, no mass  EXT:  Edema   warm  NEURO: sedated but moving extremities spontaneously when light  SKIN: no obvious rash;  No cyanosis or mottling  LINES: clean, dry intact         Data:   All data and imaging reviewed     ROUTINE ICU LABS (Last four results)  CMP  Recent Labs  Lab 12/13/17  2035 12/13/17  1836 12/13/17  1148   *  --  140   POTASSIUM 2.9*  --  2.7*   CHLORIDE 115*  --  105   CO2 15*  --  20   ANIONGAP 16*  --  15*   GLC 77  --  107*   BUN 16  --  18   CR 1.09*  --  1.22*   GFRESTIMATED 54*  --  47*   GFRESTBLACK 65  --  57*   ISAURO 11.2*  --  9.6   PROTTOTAL  --  5.2* 7.6   ALBUMIN  --  2.5* 4.0   BILITOTAL  --  0.6 1.0   ALKPHOS  --  172* 281*   AST  --  128* 273*   ALT  --  106* 152*     CBC  Recent Labs  Lab 12/13/17  1148   WBC 15.6*   RBC 4.64   HGB 14.3   HCT 41.9   MCV 90   MCH 30.8   MCHC 34.1   RDW 17.1*        INR  Recent Labs  Lab 12/13/17  1148   INR 1.05     Arterial Blood Gas  Recent Labs  Lab 12/13/17  1445   PH 7.22*   PCO2 41   PO2 193*   HCO3 17*   O2PER Ventilator       All cultures:  No results for input(s): CULT in the last 168 hours.  Recent Results (from the past 24 hour(s))   XR Chest Port 1 View    Narrative    XR CHEST PORT 1 VW 12/13/2017 12:44 PM    HISTORY: Drug ingestion.     COMPARISON: 3/8/2017    FINDINGS: No airspace consolidation, pleural effusion or pneumothorax.  Normal heart size. Postsurgical change at the left lung apex. Enteric  tube extends the stomach. Right central line tip is in the projection  of the SVC.      Impression    IMPRESSION: No acute pulmonary abnormality.    CHARLEY ESCOTO MD   Head CT w/o contrast    Narrative    CT OF THE HEAD WITHOUT CONTRAST  12/13/2017 1:13 PM     COMPARISON: Head CT  3/9/2017.    HISTORY: Altered mental status.    TECHNIQUE: Axial CT images of the head from the skull base to the  vertex were acquired without IV contrast.    FINDINGS:  The ventricles and basal cisterns are within normal limits  in configuration. There is no midline shift. There are no extra-axial  fluid collections.  Gray-white differentiation is well maintained.     No intracranial hemorrhage, mass or recent infarct.    The visualized paranasal sinuses are well aerated. There is no  mastoiditis. There are no fractures of the visualized bones.       Impression    IMPRESSION:  Normal head CT.     Radiation dose for this scan was reduced using automated exposure  control, adjustment of the mA and/or kV according to patient size, or  iterative reconstruction technique.    MD Calderon ROSE MD    Billing: This patient is critically ill: Yes. Total critical care time today 65 min.

## 2017-12-14 NOTE — ANESTHESIA PROCEDURE NOTES
ANESTHESIOLOGY RESIDENT/CRNA INTUBATION NOTE  Indication for intubation: altered level of consciousness, airway protection.  Provider Ordering Intubation: BRADLY WEI  History regarding the most recent potassium obtained: Yes  History regarding renal failure obtained: Yes  History of presence or absence of CVA/stroke was obtained: Yes  History of presence or absence of NM disorder obtained: Yes  Post Intubation:  ETT secured, Sedation to be ordered by primary/ICU team, Vent settings by primary/ICU team, No apparent complications, Primary/ICU team to review CXR and Report given to primary nurse and/or team  Called to [facility] for intubation of patient with altered LOC and airway protection.  Pt O2 saturation 98% on FM, hemodynamically stable, pt unresponsive.  Intubated UDVx1 without problems, BBS, EtCO2 with color change.  Dentition as pre-op.  Uneventful intubation.

## 2017-12-14 NOTE — PLAN OF CARE
Problem: Restraint for Non-Violent/Non-Self-Destructive Behavior  Goal: Prevent/Manage Potential Problems  Maintain safety of patient and others during period of restraint.  Promote psychological and physical wellbeing.  Prevent injury to skin and involved body parts.  Promote nutrition, hydration, and elimination.   Outcome: Declining  Bilateral wrist restraints placed for pulling at ETT and lines. Not redirectable to verbal commands. Will continue to assess for less restrictive alternatives.

## 2017-12-14 NOTE — PROGRESS NOTES
MICU STAFF CRITICAL CARE PROGRESS NOTE:    I saw and examined the patient with the MICU team and concur with findings and A&P as detailed in Dr. Avina's note of today    48 yo woman transferred from [facility] where she presented after being found by EMS with amlodopine, (2 empty bottles) vodka, and nyquil by her.  Intubated upon arrival to [facility] to protect her airway and admitted with hypotension.  Started on levophed, high dose insulin, glucagon, Ca and NAC there prior to transfer.    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  Not working.  Former teacher with Masters degree    On arrival  Bradycardic 50-60.  MAP 60s.    Intubated and ventilated woman 26-30  Lungs clear  Herve RRR no m/g/r  Abd soft nontender  Good pulses  On versed 8, precedex and fentanyl 50    Hgb 10.7 WBC 30 Plt 237K  INR 1.49  144 K 3.4  HCO3 12 17/1.2 (0.75 bl)  Lactate 6.6  PO4 1.8  Mg 1.3; Davidson 6.8  Biil 0.6  142/96  EtOH 0.17  Tylenol 40;   CXR:  Nl except possible LLL retrocardiac streaky densities    NeuroPsych / Suicide attempt / Polysubstance OD   On iv NAC since 1:30 yest PM  On versed drip with Fentanyl bolus    Bradycardia / Hypotension - shock  Titrated off precedex and fentanyl drips  Off levophed for 3-4 hours now  On insulin 10U/kg/hr    Lactic Acidosis / EDUARDO  Making urine    Intubation / Ventilation  Extubated ~ 4 AM with involvement of Dr. Serna  And initially did well with good O2 sats on RA    This am post extubation c/o N, V and abd pain and became very agitated  Given iv valium (10 mgs x 3), ativan 0.5mgs; and iv haldol 2 mg @ 7 AM and benadryl (25 mgs)  Tachypneic with OK  sats on 6L NC O2  Then became unresponsive after desaturation  Intubated acutely by anesthesia with paralytics (RUDDY)  given for procedure    (Critical Care 45 minutes cumulative thus far today)    Sly Augustine MD  MICU Staff  [id number]

## 2017-12-14 NOTE — PROGRESS NOTES
Dr. Jeffrey here to see patient and insert arterial line. Ordered patient transferred to [facility] for higher level of care. Arterial ine inserted without complications.   EMS here to transport patient at 2130 and care turned over to them at that time. Patient left facility with EMS at 2213.

## 2017-12-14 NOTE — PLAN OF CARE
Problem: Patient Care Overview  Goal: Plan of Care/Patient Progress Review    SLP 4C: Clinical swallow evaluation orders received. Pt was extubated overnight, but then re-intubated due to respiratory failure. Pt no longer medically appropriate for swallow evaluation. Please re-consult as indicated when medically appropriate. SLP to complete orders.

## 2017-12-14 NOTE — PROGRESS NOTES
[facility], [place]  Procedure Note           Intubation:       Ana Laura Wharton  MRN# [id number]   8:58 AM, December 14, 2017 Indication: Respiratory failure           Patient reintubated at: December 14, 2017, 7:20 AM   Patient and family informed of: Why intubation was required   Informed consent: Obtained   Cervical spine: Was not stabilized during the procedure   Sedative medication: Was administered during the procedure   Technique used: Direct laryngoscopy   Endotracheal tube size: 7.0 cm with cuff   Number of attempts: Once     Placement confirmed by: Auscultation of bilateral breath sounds  Visualization of bilateral chest wall rise  End-tidal CO2 monitor   ET tube repositioning: Was not performed   Tube secured at: 24 cm at the lips      This procedure was performed without difficulty and she tolerated the procedure well with no complications.      Recorded by Xander Jimenez

## 2017-12-14 NOTE — PHARMACY-VANCOMYCIN DOSING SERVICE
Pharmacy Vancomycin Initial Note  Date of Service 2017  Patient's  1970  47 year old, female    Indication: Sepsis and leukocytosis    Current estimated CrCl = CrCl cannot be calculated (Unknown ideal weight.).    Creatinine for last 3 days  2017: 11:48 AM Creatinine 1.22 mg/dL;  8:35 PM Creatinine 1.09 mg/dL; 11:56 PM Creatinine 1.13 mg/dL  2017:  4:29 AM Creatinine 1.20 mg/dL;  8:47 AM Creatinine 1.17 mg/dL; 12:00 PM Creatinine 1.30 mg/dL    Recent Vancomycin Level(s) for last 3 days  No results found for requested labs within last 72 hours.      Vancomycin IV Administrations (past 72 hours)      No vancomycin orders with administrations in past 72 hours.                Nephrotoxins and other renal medications (Future)    Start     Dose/Rate Route Frequency Ordered Stop    17 1529  vancomycin place lockhart - receiving intermittent dosing      1 each Does not apply SEE ADMIN INSTRUCTIONS 17 1530      17 1200  vancomycin (VANCOCIN) 1,500 mg in NaCl 0.9 % 250 mL intermittent infusion      1,500 mg  over 90 Minutes Intravenous ONCE 17 1146      17 1130  phenylephrine (LETA-SYNEPHRINE) 50 mg in NaCl 0.9 % 250 mL infusion      0.5-6 mcg/kg/min × 62.3 kg (Dosing Weight)  9.3-112.1 mL/hr  Intravenous CONTINUOUS 17 1126      17 1015  vasopressin (PITRESSIN) 40 Units in D5W 40 mL infusion      2.4 Units/hr  2.4 mL/hr  Intravenous CONTINUOUS 17 1014      17 0130  norepinephrine (LEVOPHED) 16 mg in D5W 250 mL infusion      0.03-0.6 mcg/kg/min × 62.3 kg  1.8-35 mL/hr  Intravenous CONTINUOUS 17 0124            Contrast Orders - past 72 hours     None                Plan:  1.  Start vancomycin  1500 mg (~24 mg/kg) IV once.   2.  Goal Trough Level: 15-20 mg/L   3.  Pharmacy will check trough levels as appropriate in tomorrow with AM labs in the setting of unknown renal function and low urine output..    4. Serum creatinine levels will be  ordered daily for the first week of therapy and at least twice weekly for subsequent weeks.    5. [place] method utilized to dose vancomycin therapy: Method 2    Carlos Enrique Cunningham, JaclynD

## 2017-12-14 NOTE — PROGRESS NOTES
Pt came from OSH intubated with ETT 7.5 24 @ lip. Pt put on vent settings per MD order. Labs and xray pending. Will continue to follow

## 2017-12-14 NOTE — ANESTHESIA CARE TRANSFER NOTE
Patient: Ana Laura Wharton    * No procedures listed *    Diagnosis: * No pre-op diagnosis entered *  Diagnosis Additional Information: No value filed.    Anesthesia Type:   No value filed.     Note:  Airway :ETT  Patient transferred to:ICU  Comments: Called to emergently intubate pt with altered LOC.  Hemodynamically stable, Intubated without issues, EtCO2 detector, BBS per ICU MD, verbalized need for sedation to ICU MD (Lockram).  Uneventful intubation.ICU Handoff: Call for PAUSE to initiate/utilize ICU HANDOFF, Identified Patient, Identified Responsible Provider, Reviewed the Pertinent Medical History, Discussed Surgical Course, Reviewed Intra-OP Anesthesia Management and Issues during Anesthesia, Set Expectations for Post Procedure Period and Allowed Opportunity for Questions and Acknowledgement of Understanding      Vitals: (Last set prior to Anesthesia Care Transfer)    CRNA VITALS  12/14/2017 0715 - 12/14/2017 0745      12/14/2017             SpO2: 99 %    EKG: Sinus rhythm                Electronically Signed By: AKILAH Lerma CRNA  December 14, 2017  7:45 AM

## 2017-12-15 ENCOUNTER — APPOINTMENT (OUTPATIENT)
Dept: GENERAL RADIOLOGY | Facility: CLINIC | Age: 47
DRG: 917 | End: 2017-12-15
Attending: INTERNAL MEDICINE
Payer: COMMERCIAL

## 2017-12-15 LAB
ACETAMINOPHEN QUAL: NEGATIVE
ALBUMIN SERPL-MCNC: 1.6 G/DL (ref 3.4–5)
ALBUMIN UR-MCNC: ABNORMAL MG/DL
ALBUMIN UR-MCNC: NEGATIVE MG/DL
ALP SERPL-CCNC: 114 U/L (ref 40–150)
ALT SERPL W P-5'-P-CCNC: 43 U/L (ref 0–50)
AMANTADINE: NEGATIVE
AMITRIPTYLINE QUAL: NEGATIVE
AMOXAPINE: NEGATIVE
AMPHETAMINES QUAL: NEGATIVE
ANION GAP SERPL CALCULATED.3IONS-SCNC: 10 MMOL/L (ref 3–14)
ANION GAP SERPL CALCULATED.3IONS-SCNC: 5 MMOL/L (ref 3–14)
ANION GAP SERPL CALCULATED.3IONS-SCNC: 7 MMOL/L (ref 3–14)
ANION GAP SERPL CALCULATED.3IONS-SCNC: 9 MMOL/L (ref 3–14)
ANISOCYTOSIS BLD QL SMEAR: SLIGHT
APPEARANCE UR: ABNORMAL
APPEARANCE UR: CLEAR
APTT PPP: 30 SEC (ref 22–37)
AST SERPL W P-5'-P-CCNC: 73 U/L (ref 0–45)
ATROPINE: NEGATIVE
BACTERIA SPEC CULT: NORMAL
BASE DEFICIT BLDA-SCNC: 0.4 MMOL/L
BASE DEFICIT BLDA-SCNC: 3.1 MMOL/L
BASE DEFICIT BLDA-SCNC: 3.1 MMOL/L
BASE EXCESS BLDA CALC-SCNC: 11.8 MMOL/L
BASE EXCESS BLDA CALC-SCNC: 13.5 MMOL/L
BASE EXCESS BLDA CALC-SCNC: 6.3 MMOL/L
BASE EXCESS BLDA CALC-SCNC: 9.1 MMOL/L
BASE EXCESS BLDV CALC-SCNC: 11.6 MMOL/L
BASOPHILS # BLD AUTO: 0 10E9/L (ref 0–0.2)
BASOPHILS NFR BLD AUTO: 0 %
BENZODIAZ UR QL: POSITIVE
BILIRUB DIRECT SERPL-MCNC: 0.3 MG/DL (ref 0–0.2)
BILIRUB SERPL-MCNC: 0.8 MG/DL (ref 0.2–1.3)
BILIRUB UR QL STRIP: ABNORMAL
BILIRUB UR QL STRIP: NEGATIVE
BUN SERPL-MCNC: 16 MG/DL (ref 7–30)
BUN SERPL-MCNC: 16 MG/DL (ref 7–30)
BUN SERPL-MCNC: 17 MG/DL (ref 7–30)
CA-I BLD-MCNC: 10.1 MG/DL (ref 4.4–5.2)
CA-I BLD-MCNC: 10.1 MG/DL (ref 4.4–5.2)
CA-I BLD-MCNC: 10.2 MG/DL (ref 4.4–5.2)
CA-I BLD-MCNC: 10.3 MG/DL (ref 4.4–5.2)
CA-I BLD-MCNC: 10.3 MG/DL (ref 4.4–5.2)
CA-I BLD-MCNC: 10.9 MG/DL (ref 4.4–5.2)
CA-I BLD-MCNC: 11.4 MG/DL (ref 4.4–5.2)
CA-I BLD-MCNC: 9.9 MG/DL (ref 4.4–5.2)
CAFFEINE QUAL: NEGATIVE
CALCIUM SERPL-MCNC: 14.7 MG/DL (ref 8.5–10.1)
CALCIUM SERPL-MCNC: 15.2 MG/DL (ref 8.5–10.1)
CALCIUM SERPL-MCNC: 16 MG/DL (ref 8.5–10.1)
CALCIUM SERPL-MCNC: 16.2 MG/DL (ref 8.5–10.1)
CALCIUM SERPL-MCNC: 16.6 MG/DL (ref 8.5–10.1)
CALCIUM SERPL-MCNC: 17.1 MG/DL (ref 8.5–10.1)
CANNABINOIDS UR QL SCN: POSITIVE
CARBAMAZEPINE QUAL: NEGATIVE
CHLORIDE SERPL-SCNC: 112 MMOL/L (ref 94–109)
CHLORIDE SERPL-SCNC: 113 MMOL/L (ref 94–109)
CHLORIDE SERPL-SCNC: 113 MMOL/L (ref 94–109)
CHLORIDE SERPL-SCNC: 114 MMOL/L (ref 94–109)
CHLORIDE SERPL-SCNC: 115 MMOL/L (ref 94–109)
CHLORIDE SERPL-SCNC: 116 MMOL/L (ref 94–109)
CHLORPHENIRAMINE: NEGATIVE
CHLORPROMAZINE: NEGATIVE
CITALOPRAM QUAL: NEGATIVE
CLOMIPRAMINE QUAL: NEGATIVE
CO2 SERPL-SCNC: 25 MMOL/L (ref 20–32)
CO2 SERPL-SCNC: 27 MMOL/L (ref 20–32)
CO2 SERPL-SCNC: 30 MMOL/L (ref 20–32)
CO2 SERPL-SCNC: 30 MMOL/L (ref 20–32)
CO2 SERPL-SCNC: 34 MMOL/L (ref 20–32)
CO2 SERPL-SCNC: 35 MMOL/L (ref 20–32)
COCAINE QUAL: NEGATIVE
COCAINE UR QL: NEGATIVE
CODEINE QUAL: NEGATIVE
COLOR UR AUTO: ABNORMAL
COLOR UR AUTO: ABNORMAL
CREAT SERPL-MCNC: 1.25 MG/DL (ref 0.52–1.04)
CREAT SERPL-MCNC: 1.27 MG/DL (ref 0.52–1.04)
CREAT SERPL-MCNC: 1.42 MG/DL (ref 0.52–1.04)
CREAT SERPL-MCNC: 1.45 MG/DL (ref 0.52–1.04)
CREAT SERPL-MCNC: 1.51 MG/DL (ref 0.52–1.04)
CREAT SERPL-MCNC: 1.62 MG/DL (ref 0.52–1.04)
DESIPRAMINE QUAL: NEGATIVE
DEXTROMETHORPHAN: POSITIVE
DIFFERENTIAL METHOD BLD: ABNORMAL
DIPHENHYDRAMINE: POSITIVE
DOXEPIN/METABOLITE: NEGATIVE
DOXYLAMINE: POSITIVE
EOSINOPHIL # BLD AUTO: 0 10E9/L (ref 0–0.7)
EOSINOPHIL NFR BLD AUTO: 0 %
EPHEDRINE OR PSEUDO: NEGATIVE
ERYTHROCYTE [DISTWIDTH] IN BLOOD BY AUTOMATED COUNT: 16.7 % (ref 10–15)
FENTANYL QUAL: NEGATIVE
FIBRINOGEN PPP-MCNC: 242 MG/DL (ref 200–420)
FIBRINOGEN PPP-MCNC: 310 MG/DL (ref 200–420)
FIBRINOGEN PPP-MCNC: 343 MG/DL (ref 200–420)
FIBRINOGEN PPP-MCNC: 375 MG/DL (ref 200–420)
FIBRINOGEN PPP-MCNC: 438 MG/DL (ref 200–420)
FLUOXETINE AND METAB: NEGATIVE
GFR SERPL CREATININE-BSD FRML MDRD: 34 ML/MIN/1.7M2
GFR SERPL CREATININE-BSD FRML MDRD: 37 ML/MIN/1.7M2
GFR SERPL CREATININE-BSD FRML MDRD: 39 ML/MIN/1.7M2
GFR SERPL CREATININE-BSD FRML MDRD: 40 ML/MIN/1.7M2
GFR SERPL CREATININE-BSD FRML MDRD: 45 ML/MIN/1.7M2
GFR SERPL CREATININE-BSD FRML MDRD: 46 ML/MIN/1.7M2
GLUCOSE BLDC GLUCOMTR-MCNC: 149 MG/DL (ref 70–99)
GLUCOSE BLDC GLUCOMTR-MCNC: 150 MG/DL (ref 70–99)
GLUCOSE BLDC GLUCOMTR-MCNC: 152 MG/DL (ref 70–99)
GLUCOSE BLDC GLUCOMTR-MCNC: 154 MG/DL (ref 70–99)
GLUCOSE BLDC GLUCOMTR-MCNC: 155 MG/DL (ref 70–99)
GLUCOSE BLDC GLUCOMTR-MCNC: 157 MG/DL (ref 70–99)
GLUCOSE BLDC GLUCOMTR-MCNC: 160 MG/DL (ref 70–99)
GLUCOSE BLDC GLUCOMTR-MCNC: 161 MG/DL (ref 70–99)
GLUCOSE BLDC GLUCOMTR-MCNC: 162 MG/DL (ref 70–99)
GLUCOSE BLDC GLUCOMTR-MCNC: 165 MG/DL (ref 70–99)
GLUCOSE BLDC GLUCOMTR-MCNC: 165 MG/DL (ref 70–99)
GLUCOSE BLDC GLUCOMTR-MCNC: 171 MG/DL (ref 70–99)
GLUCOSE BLDC GLUCOMTR-MCNC: 173 MG/DL (ref 70–99)
GLUCOSE BLDC GLUCOMTR-MCNC: 173 MG/DL (ref 70–99)
GLUCOSE BLDC GLUCOMTR-MCNC: 174 MG/DL (ref 70–99)
GLUCOSE BLDC GLUCOMTR-MCNC: 175 MG/DL (ref 70–99)
GLUCOSE BLDC GLUCOMTR-MCNC: 175 MG/DL (ref 70–99)
GLUCOSE BLDC GLUCOMTR-MCNC: 176 MG/DL (ref 70–99)
GLUCOSE BLDC GLUCOMTR-MCNC: 178 MG/DL (ref 70–99)
GLUCOSE BLDC GLUCOMTR-MCNC: 179 MG/DL (ref 70–99)
GLUCOSE BLDC GLUCOMTR-MCNC: 180 MG/DL (ref 70–99)
GLUCOSE BLDC GLUCOMTR-MCNC: 183 MG/DL (ref 70–99)
GLUCOSE BLDC GLUCOMTR-MCNC: 186 MG/DL (ref 70–99)
GLUCOSE BLDC GLUCOMTR-MCNC: 187 MG/DL (ref 70–99)
GLUCOSE BLDC GLUCOMTR-MCNC: 188 MG/DL (ref 70–99)
GLUCOSE BLDC GLUCOMTR-MCNC: 189 MG/DL (ref 70–99)
GLUCOSE BLDC GLUCOMTR-MCNC: 192 MG/DL (ref 70–99)
GLUCOSE BLDC GLUCOMTR-MCNC: 193 MG/DL (ref 70–99)
GLUCOSE BLDC GLUCOMTR-MCNC: 195 MG/DL (ref 70–99)
GLUCOSE BLDC GLUCOMTR-MCNC: 199 MG/DL (ref 70–99)
GLUCOSE BLDC GLUCOMTR-MCNC: 200 MG/DL (ref 70–99)
GLUCOSE BLDC GLUCOMTR-MCNC: 200 MG/DL (ref 70–99)
GLUCOSE BLDC GLUCOMTR-MCNC: 201 MG/DL (ref 70–99)
GLUCOSE BLDC GLUCOMTR-MCNC: 203 MG/DL (ref 70–99)
GLUCOSE BLDC GLUCOMTR-MCNC: 219 MG/DL (ref 70–99)
GLUCOSE BLDC GLUCOMTR-MCNC: 221 MG/DL (ref 70–99)
GLUCOSE SERPL-MCNC: 162 MG/DL (ref 70–99)
GLUCOSE SERPL-MCNC: 167 MG/DL (ref 70–99)
GLUCOSE SERPL-MCNC: 190 MG/DL (ref 70–99)
GLUCOSE SERPL-MCNC: 192 MG/DL (ref 70–99)
GLUCOSE SERPL-MCNC: 206 MG/DL (ref 70–99)
GLUCOSE SERPL-MCNC: 208 MG/DL (ref 70–99)
GLUCOSE UR STRIP-MCNC: ABNORMAL MG/DL
GLUCOSE UR STRIP-MCNC: NEGATIVE MG/DL
HCO3 BLD-SCNC: 25 MMOL/L (ref 21–28)
HCO3 BLD-SCNC: 25 MMOL/L (ref 21–28)
HCO3 BLD-SCNC: 27 MMOL/L (ref 21–28)
HCO3 BLD-SCNC: 32 MMOL/L (ref 21–28)
HCO3 BLD-SCNC: 35 MMOL/L (ref 21–28)
HCO3 BLD-SCNC: 36 MMOL/L (ref 21–28)
HCO3 BLD-SCNC: 38 MMOL/L (ref 21–28)
HCO3 BLDV-SCNC: 38 MMOL/L (ref 21–28)
HCT VFR BLD AUTO: 36.7 % (ref 35–47)
HGB BLD-MCNC: 11.7 G/DL (ref 11.7–15.7)
HGB UR QL STRIP: ABNORMAL
HGB UR QL STRIP: NEGATIVE
HYALINE CASTS #/AREA URNS LPF: 1 /LPF (ref 0–2)
HYDROCODONE QUAL: NEGATIVE
HYDROMORPHONE QUAL: NEGATIVE
IBUPROFEN QUAL: NEGATIVE
IMIPRAMINE QUAL: NEGATIVE
INR PPP: 1.42 (ref 0.86–1.14)
INR PPP: 1.42 (ref 0.86–1.14)
INR PPP: 1.48 (ref 0.86–1.14)
INR PPP: 1.52 (ref 0.86–1.14)
INR PPP: 1.55 (ref 0.86–1.14)
KETONES UR STRIP-MCNC: ABNORMAL MG/DL
KETONES UR STRIP-MCNC: NEGATIVE MG/DL
LACTATE BLD-SCNC: 4.3 MMOL/L (ref 0.7–2)
LACTATE BLD-SCNC: 4.5 MMOL/L (ref 0.7–2)
LACTATE BLD-SCNC: 4.7 MMOL/L (ref 0.7–2)
LACTATE BLD-SCNC: 4.8 MMOL/L (ref 0.7–2)
LACTATE BLD-SCNC: 4.9 MMOL/L (ref 0.7–2)
LACTATE BLD-SCNC: 5.1 MMOL/L (ref 0.7–2)
LACTATE BLD-SCNC: 5.2 MMOL/L (ref 0.7–2)
LAMOTRIGINE QUAL: NEGATIVE
LEUKOCYTE ESTERASE UR QL STRIP: ABNORMAL
LEUKOCYTE ESTERASE UR QL STRIP: NEGATIVE
LOXAPINE: NEGATIVE
LYMPHOCYTES # BLD AUTO: 2.4 10E9/L (ref 0.8–5.3)
LYMPHOCYTES NFR BLD AUTO: 6.7 %
MAGNESIUM SERPL-MCNC: 1.5 MG/DL (ref 1.6–2.3)
MAGNESIUM SERPL-MCNC: 1.6 MG/DL (ref 1.6–2.3)
MAGNESIUM SERPL-MCNC: 1.7 MG/DL (ref 1.6–2.3)
MAGNESIUM SERPL-MCNC: 1.7 MG/DL (ref 1.6–2.3)
MAGNESIUM SERPL-MCNC: 2.3 MG/DL (ref 1.6–2.3)
MAGNESIUM SERPL-MCNC: 2.4 MG/DL (ref 1.6–2.3)
MAPROTYLINE: NEGATIVE
MCH RBC QN AUTO: 31.3 PG (ref 26.5–33)
MCHC RBC AUTO-ENTMCNC: 31.9 G/DL (ref 31.5–36.5)
MCV RBC AUTO: 98 FL (ref 78–100)
MDMA QUAL: NEGATIVE
MEPERIDINE QUAL: NEGATIVE
METHAMPHETAMINE: NEGATIVE
METHODONE QUAL: NEGATIVE
MONOCYTES # BLD AUTO: 0.9 10E9/L (ref 0–1.3)
MONOCYTES NFR BLD AUTO: 2.5 %
MORPHINE QUAL: NEGATIVE
MUCOUS THREADS #/AREA URNS LPF: PRESENT /LPF
NEUTROPHILS # BLD AUTO: 32.1 10E9/L (ref 1.6–8.3)
NEUTROPHILS NFR BLD AUTO: 90.8 %
NICOTINE: NEGATIVE
NITRATE UR QL: ABNORMAL
NITRATE UR QL: NEGATIVE
NORTRIPTYLINE QUAL: NEGATIVE
NRBC # BLD AUTO: 0.3 10*3/UL
NRBC BLD AUTO-RTO: 1 /100
O2/TOTAL GAS SETTING VFR VENT: 100 %
O2/TOTAL GAS SETTING VFR VENT: 100 %
O2/TOTAL GAS SETTING VFR VENT: 55 %
O2/TOTAL GAS SETTING VFR VENT: 55 %
O2/TOTAL GAS SETTING VFR VENT: 90 %
O2/TOTAL GAS SETTING VFR VENT: ABNORMAL %
OLANZAPINE QUAL: POSITIVE
OPIATES UR QL SCN: NEGATIVE
OXYCODONE QUAL: NEGATIVE
PCO2 BLD: 45 MM HG (ref 35–45)
PCO2 BLD: 46 MM HG (ref 35–45)
PCO2 BLD: 48 MM HG (ref 35–45)
PCO2 BLD: 54 MM HG (ref 35–45)
PCO2 BLD: 55 MM HG (ref 35–45)
PCO2 BLD: 58 MM HG (ref 35–45)
PCO2 BLD: 60 MM HG (ref 35–45)
PCO2 BLDV: 57 MM HG (ref 40–50)
PENTAZOCINE: NEGATIVE
PH BLD: 7.23 PH (ref 7.35–7.45)
PH BLD: 7.24 PH (ref 7.35–7.45)
PH BLD: 7.3 PH (ref 7.35–7.45)
PH BLD: 7.42 PH (ref 7.35–7.45)
PH BLD: 7.42 PH (ref 7.35–7.45)
PH BLD: 7.51 PH (ref 7.35–7.45)
PH BLD: 7.52 PH (ref 7.35–7.45)
PH BLDV: 7.43 PH (ref 7.32–7.43)
PH UR STRIP: 5 PH (ref 5–7)
PH UR STRIP: ABNORMAL PH (ref 5–7)
PHENCYCLIDINE QUAL: NEGATIVE
PHENMETRAZINE: NEGATIVE
PHENTERMINE: NEGATIVE
PHENYLBUTAZONE: NEGATIVE
PHENYLPROPANOLAMINE: NEGATIVE
PHOSPHATE SERPL-MCNC: 2 MG/DL (ref 2.5–4.5)
PHOSPHATE SERPL-MCNC: 2.4 MG/DL (ref 2.5–4.5)
PHOSPHATE SERPL-MCNC: 3.3 MG/DL (ref 2.5–4.5)
PHOSPHATE SERPL-MCNC: 3.8 MG/DL (ref 2.5–4.5)
PHOSPHATE SERPL-MCNC: 4.6 MG/DL (ref 2.5–4.5)
PHOSPHATE SERPL-MCNC: 6.5 MG/DL (ref 2.5–4.5)
PLATELET # BLD AUTO: 119 10E9/L (ref 150–450)
PLATELET # BLD EST: ABNORMAL 10*3/UL
PO2 BLD: 115 MM HG (ref 80–105)
PO2 BLD: 65 MM HG (ref 80–105)
PO2 BLD: 66 MM HG (ref 80–105)
PO2 BLD: 77 MM HG (ref 80–105)
PO2 BLD: 81 MM HG (ref 80–105)
PO2 BLD: 87 MM HG (ref 80–105)
PO2 BLD: 93 MM HG (ref 80–105)
PO2 BLDV: 34 MM HG (ref 25–47)
POTASSIUM BLD-SCNC: 2.6 MMOL/L (ref 3.4–5.3)
POTASSIUM SERPL-SCNC: 3 MMOL/L (ref 3.4–5.3)
POTASSIUM SERPL-SCNC: 3.2 MMOL/L (ref 3.4–5.3)
POTASSIUM SERPL-SCNC: 3.3 MMOL/L (ref 3.4–5.3)
POTASSIUM SERPL-SCNC: 3.4 MMOL/L (ref 3.4–5.3)
POTASSIUM SERPL-SCNC: 3.6 MMOL/L (ref 3.4–5.3)
POTASSIUM SERPL-SCNC: 3.7 MMOL/L (ref 3.4–5.3)
POTASSIUM SERPL-SCNC: 3.7 MMOL/L (ref 3.4–5.3)
PROPOXPHENE QUAL: NEGATIVE
PROPRANOLOL QUAL: NEGATIVE
PROT SERPL-MCNC: 3.9 G/DL (ref 6.8–8.8)
PYRILAMINE: NEGATIVE
RBC # BLD AUTO: 3.74 10E12/L (ref 3.8–5.2)
RBC #/AREA URNS AUTO: <1 /HPF (ref 0–2)
RBC #/AREA URNS AUTO: ABNORMAL /HPF (ref 0–2)
SALICYLATE QUAL: NEGATIVE
SODIUM SERPL-SCNC: 149 MMOL/L (ref 133–144)
SODIUM SERPL-SCNC: 152 MMOL/L (ref 133–144)
SODIUM SERPL-SCNC: 154 MMOL/L (ref 133–144)
SODIUM SERPL-SCNC: 154 MMOL/L (ref 133–144)
SOURCE: ABNORMAL
SOURCE: ABNORMAL
SP GR UR STRIP: 1 (ref 1–1.03)
SP GR UR STRIP: ABNORMAL (ref 1–1.03)
SPECIMEN SOURCE: NORMAL
SQUAMOUS #/AREA URNS AUTO: <1 /HPF (ref 0–1)
THEOBROMINE: NEGATIVE
TOPIRAMATE QUAL: NEGATIVE
TRIMIPRAMINE QUAL: NEGATIVE
UROBILINOGEN UR STRIP-MCNC: ABNORMAL MG/DL (ref 0–2)
UROBILINOGEN UR STRIP-MCNC: NORMAL MG/DL (ref 0–2)
VANCOMYCIN SERPL-MCNC: 13.3 MG/L
VENLAFAXINE QUAL: NEGATIVE
WBC # BLD AUTO: 35.3 10E9/L (ref 4–11)
WBC #/AREA URNS AUTO: <1 /HPF (ref 0–2)
WBC #/AREA URNS AUTO: ABNORMAL /HPF

## 2017-12-15 PROCEDURE — 81001 URINALYSIS AUTO W/SCOPE: CPT | Performed by: INTERNAL MEDICINE

## 2017-12-15 PROCEDURE — 94640 AIRWAY INHALATION TREATMENT: CPT

## 2017-12-15 PROCEDURE — 82330 ASSAY OF CALCIUM: CPT | Performed by: INTERNAL MEDICINE

## 2017-12-15 PROCEDURE — 40000014 ZZH STATISTIC ARTERIAL MONITORING DAILY

## 2017-12-15 PROCEDURE — 87040 BLOOD CULTURE FOR BACTERIA: CPT | Performed by: STUDENT IN AN ORGANIZED HEALTH CARE EDUCATION/TRAINING PROGRAM

## 2017-12-15 PROCEDURE — 94003 VENT MGMT INPAT SUBQ DAY: CPT

## 2017-12-15 PROCEDURE — 82803 BLOOD GASES ANY COMBINATION: CPT | Performed by: INTERNAL MEDICINE

## 2017-12-15 PROCEDURE — 83735 ASSAY OF MAGNESIUM: CPT | Performed by: INTERNAL MEDICINE

## 2017-12-15 PROCEDURE — 84132 ASSAY OF SERUM POTASSIUM: CPT | Performed by: INTERNAL MEDICINE

## 2017-12-15 PROCEDURE — 25000125 ZZHC RX 250: Performed by: STUDENT IN AN ORGANIZED HEALTH CARE EDUCATION/TRAINING PROGRAM

## 2017-12-15 PROCEDURE — 85384 FIBRINOGEN ACTIVITY: CPT | Performed by: INTERNAL MEDICINE

## 2017-12-15 PROCEDURE — 94640 AIRWAY INHALATION TREATMENT: CPT | Mod: 76

## 2017-12-15 PROCEDURE — 80048 BASIC METABOLIC PNL TOTAL CA: CPT | Performed by: INTERNAL MEDICINE

## 2017-12-15 PROCEDURE — 99291 CRITICAL CARE FIRST HOUR: CPT | Mod: GC | Performed by: INTERNAL MEDICINE

## 2017-12-15 PROCEDURE — 25000128 H RX IP 250 OP 636: Performed by: INTERNAL MEDICINE

## 2017-12-15 PROCEDURE — 00000146 ZZHCL STATISTIC GLUCOSE BY METER IP

## 2017-12-15 PROCEDURE — 25000125 ZZHC RX 250: Performed by: INTERNAL MEDICINE

## 2017-12-15 PROCEDURE — 25000128 H RX IP 250 OP 636

## 2017-12-15 PROCEDURE — 20000004 ZZH R&B ICU UMMC

## 2017-12-15 PROCEDURE — 85610 PROTHROMBIN TIME: CPT | Performed by: INTERNAL MEDICINE

## 2017-12-15 PROCEDURE — 25000131 ZZH RX MED GY IP 250 OP 636 PS 637: Performed by: INTERNAL MEDICINE

## 2017-12-15 PROCEDURE — 27210995 ZZH RX 272: Performed by: STUDENT IN AN ORGANIZED HEALTH CARE EDUCATION/TRAINING PROGRAM

## 2017-12-15 PROCEDURE — 84132 ASSAY OF SERUM POTASSIUM: CPT | Performed by: STUDENT IN AN ORGANIZED HEALTH CARE EDUCATION/TRAINING PROGRAM

## 2017-12-15 PROCEDURE — 83605 ASSAY OF LACTIC ACID: CPT | Performed by: INTERNAL MEDICINE

## 2017-12-15 PROCEDURE — 82248 BILIRUBIN DIRECT: CPT | Performed by: INTERNAL MEDICINE

## 2017-12-15 PROCEDURE — 25000128 H RX IP 250 OP 636: Performed by: STUDENT IN AN ORGANIZED HEALTH CARE EDUCATION/TRAINING PROGRAM

## 2017-12-15 PROCEDURE — 80053 COMPREHEN METABOLIC PANEL: CPT | Performed by: INTERNAL MEDICINE

## 2017-12-15 PROCEDURE — 80202 ASSAY OF VANCOMYCIN: CPT | Performed by: INTERNAL MEDICINE

## 2017-12-15 PROCEDURE — 71010 XR CHEST PORT 1 VW: CPT

## 2017-12-15 PROCEDURE — 40000275 ZZH STATISTIC RCP TIME EA 10 MIN

## 2017-12-15 PROCEDURE — 84100 ASSAY OF PHOSPHORUS: CPT | Performed by: INTERNAL MEDICINE

## 2017-12-15 PROCEDURE — 85730 THROMBOPLASTIN TIME PARTIAL: CPT | Performed by: INTERNAL MEDICINE

## 2017-12-15 PROCEDURE — 85025 COMPLETE CBC W/AUTO DIFF WBC: CPT | Performed by: INTERNAL MEDICINE

## 2017-12-15 PROCEDURE — 25800025 ZZH RX 258: Performed by: STUDENT IN AN ORGANIZED HEALTH CARE EDUCATION/TRAINING PROGRAM

## 2017-12-15 RX ORDER — CEFEPIME 1 G/50ML
2 INJECTION, SOLUTION INTRAVENOUS EVERY 8 HOURS
Status: DISCONTINUED | OUTPATIENT
Start: 2017-12-15 | End: 2017-12-15

## 2017-12-15 RX ORDER — SODIUM CHLORIDE 9 MG/ML
INJECTION, SOLUTION INTRAVENOUS
Status: COMPLETED
Start: 2017-12-15 | End: 2017-12-15

## 2017-12-15 RX ORDER — POTASSIUM CHLORIDE 29.8 MG/ML
20 INJECTION INTRAVENOUS
Status: DISPENSED | OUTPATIENT
Start: 2017-12-15 | End: 2017-12-15

## 2017-12-15 RX ORDER — CEFEPIME 1 G/50ML
2 INJECTION, SOLUTION INTRAVENOUS EVERY 12 HOURS
Status: DISCONTINUED | OUTPATIENT
Start: 2017-12-15 | End: 2017-12-16

## 2017-12-15 RX ORDER — POTASSIUM CL/LIDO/0.9 % NACL 10MEQ/0.1L
10 INTRAVENOUS SOLUTION, PIGGYBACK (ML) INTRAVENOUS
Status: DISCONTINUED | OUTPATIENT
Start: 2017-12-15 | End: 2017-12-15

## 2017-12-15 RX ADMIN — HYDROCORTISONE SODIUM SUCCINATE 50 MG: 100 INJECTION, POWDER, FOR SOLUTION INTRAMUSCULAR; INTRAVENOUS at 00:31

## 2017-12-15 RX ADMIN — Medication 2 G: at 17:58

## 2017-12-15 RX ADMIN — HYDROCORTISONE SODIUM SUCCINATE 50 MG: 100 INJECTION, POWDER, FOR SOLUTION INTRAMUSCULAR; INTRAVENOUS at 18:01

## 2017-12-15 RX ADMIN — ASCORBIC ACID 1500 MG: 500 INJECTION, SOLUTION INTRAMUSCULAR; INTRAVENOUS; SUBCUTANEOUS at 12:26

## 2017-12-15 RX ADMIN — ALBUTEROL SULFATE 2.5 MG: 2.5 SOLUTION RESPIRATORY (INHALATION) at 20:22

## 2017-12-15 RX ADMIN — SODIUM CHLORIDE 500 ML: 9 INJECTION, SOLUTION INTRAVENOUS at 18:45

## 2017-12-15 RX ADMIN — POTASSIUM CHLORIDE 20 MEQ: 29.8 INJECTION, SOLUTION INTRAVENOUS at 15:55

## 2017-12-15 RX ADMIN — PANTOPRAZOLE SODIUM 40 MG: 40 INJECTION, POWDER, FOR SOLUTION INTRAVENOUS at 07:51

## 2017-12-15 RX ADMIN — DEXTROSE MONOHYDRATE: 70 INJECTION, SOLUTION INTRAVENOUS at 14:58

## 2017-12-15 RX ADMIN — LORAZEPAM 8 MG/HR: 2 INJECTION INTRAMUSCULAR; INTRAVENOUS at 19:56

## 2017-12-15 RX ADMIN — ALBUTEROL SULFATE 2.5 MG: 2.5 SOLUTION RESPIRATORY (INHALATION) at 13:03

## 2017-12-15 RX ADMIN — ALBUTEROL SULFATE 2.5 MG: 2.5 SOLUTION RESPIRATORY (INHALATION) at 04:56

## 2017-12-15 RX ADMIN — ASCORBIC ACID 1500 MG: 500 INJECTION, SOLUTION INTRAMUSCULAR; INTRAVENOUS; SUBCUTANEOUS at 06:17

## 2017-12-15 RX ADMIN — SODIUM CHLORIDE 500 ML: 9 INJECTION, SOLUTION INTRAVENOUS at 18:00

## 2017-12-15 RX ADMIN — ALBUTEROL SULFATE 2.5 MG: 2.5 SOLUTION RESPIRATORY (INHALATION) at 16:23

## 2017-12-15 RX ADMIN — POTASSIUM CHLORIDE 20 MEQ: 400 INJECTION, SOLUTION INTRAVENOUS at 18:22

## 2017-12-15 RX ADMIN — Medication 75 MCG/HR: at 19:56

## 2017-12-15 RX ADMIN — SODIUM CHLORIDE 10 UNITS/KG/HR: 0.9 INJECTION, SOLUTION INTRAVENOUS at 12:24

## 2017-12-15 RX ADMIN — DEXTROSE MONOHYDRATE: 70 INJECTION, SOLUTION INTRAVENOUS at 21:56

## 2017-12-15 RX ADMIN — LORAZEPAM 8 MG/HR: 2 INJECTION INTRAMUSCULAR; INTRAVENOUS at 06:03

## 2017-12-15 RX ADMIN — NOREPINEPHRINE BITARTRATE 0.6 MCG/KG/MIN: 1 INJECTION INTRAVENOUS at 00:04

## 2017-12-15 RX ADMIN — THIAMINE HYDROCHLORIDE 200 MG: 100 INJECTION, SOLUTION INTRAMUSCULAR; INTRAVENOUS at 07:50

## 2017-12-15 RX ADMIN — POTASSIUM PHOSPHATE, MONOBASIC AND POTASSIUM PHOSPHATE, DIBASIC 15 MMOL: 224; 236 INJECTION, SOLUTION INTRAVENOUS at 19:56

## 2017-12-15 RX ADMIN — DEXTROSE MONOHYDRATE 3 MCG/KG/MIN: 5 INJECTION, SOLUTION INTRAVENOUS at 18:24

## 2017-12-15 RX ADMIN — THIAMINE HYDROCHLORIDE 200 MG: 100 INJECTION, SOLUTION INTRAMUSCULAR; INTRAVENOUS at 19:56

## 2017-12-15 RX ADMIN — DEXTROSE MONOHYDRATE: 70 INJECTION, SOLUTION INTRAVENOUS at 07:49

## 2017-12-15 RX ADMIN — HYDROCORTISONE SODIUM SUCCINATE 50 MG: 100 INJECTION, POWDER, FOR SOLUTION INTRAMUSCULAR; INTRAVENOUS at 06:18

## 2017-12-15 RX ADMIN — ALBUTEROL SULFATE 2.5 MG: 2.5 SOLUTION RESPIRATORY (INHALATION) at 08:41

## 2017-12-15 RX ADMIN — Medication 2 G: at 10:09

## 2017-12-15 RX ADMIN — ASCORBIC ACID 1500 MG: 500 INJECTION, SOLUTION INTRAMUSCULAR; INTRAVENOUS; SUBCUTANEOUS at 19:01

## 2017-12-15 RX ADMIN — POTASSIUM CHLORIDE 20 MEQ: 400 INJECTION, SOLUTION INTRAVENOUS at 15:02

## 2017-12-15 RX ADMIN — VANCOMYCIN HYDROCHLORIDE 1250 MG: 1 INJECTION, POWDER, LYOPHILIZED, FOR SOLUTION INTRAVENOUS at 13:46

## 2017-12-15 RX ADMIN — POTASSIUM CHLORIDE 20 MEQ: 400 INJECTION, SOLUTION INTRAVENOUS at 11:11

## 2017-12-15 RX ADMIN — CEFEPIME 2 G: 1 INJECTION, SOLUTION INTRAVENOUS at 13:07

## 2017-12-15 RX ADMIN — SODIUM CHLORIDE 10 UNITS/KG/HR: 0.9 INJECTION, SOLUTION INTRAVENOUS at 04:25

## 2017-12-15 RX ADMIN — POTASSIUM CHLORIDE 20 MEQ: 400 INJECTION, SOLUTION INTRAVENOUS at 17:02

## 2017-12-15 RX ADMIN — SODIUM BICARBONATE 20 MEQ/HR: 84 INJECTION, SOLUTION INTRAVENOUS at 03:29

## 2017-12-15 RX ADMIN — HYDROCORTISONE SODIUM SUCCINATE 50 MG: 100 INJECTION, POWDER, FOR SOLUTION INTRAMUSCULAR; INTRAVENOUS at 12:15

## 2017-12-15 RX ADMIN — DEXTROSE MONOHYDRATE: 70 INJECTION, SOLUTION INTRAVENOUS at 00:09

## 2017-12-15 RX ADMIN — LORAZEPAM 8 MG/HR: 2 INJECTION INTRAMUSCULAR; INTRAVENOUS at 13:06

## 2017-12-15 RX ADMIN — FUROSEMIDE 10 MG/HR: 10 INJECTION, SOLUTION INTRAMUSCULAR; INTRAVENOUS at 06:18

## 2017-12-15 RX ADMIN — FOLIC ACID 1 MG: 5 INJECTION, SOLUTION INTRAMUSCULAR; INTRAVENOUS; SUBCUTANEOUS at 07:51

## 2017-12-15 RX ADMIN — Medication 75 MCG/HR: at 02:36

## 2017-12-15 RX ADMIN — CEFEPIME 2 G: 1 INJECTION, SOLUTION INTRAVENOUS at 02:25

## 2017-12-15 RX ADMIN — POTASSIUM CHLORIDE 20 MEQ: 400 INJECTION, SOLUTION INTRAVENOUS at 07:04

## 2017-12-15 NOTE — PHARMACY-VANCOMYCIN DOSING SERVICE
Pharmacy Vancomycin Initial Note  Date of Service December 15, 2017  Patient's  1970  47 year old, female    **re-consulted to complete 48h of initial, empiric abx**    Indication: Sepsis    Current estimated CrCl = CrCl cannot be calculated (Unknown ideal weight.).    Creatinine for last 3 days  2017: 11:48 AM Creatinine 1.22 mg/dL;  8:35 PM Creatinine 1.09 mg/dL; 11:56 PM Creatinine 1.13 mg/dL  2017:  4:29 AM Creatinine 1.20 mg/dL;  8:47 AM Creatinine 1.17 mg/dL; 12:00 PM Creatinine 1.30 mg/dL;  4:00 PM Creatinine 1.43 mg/dL;  8:02 PM Creatinine 1.58 mg/dL  12/15/2017: 12:02 AM Creatinine 1.62 mg/dL;  4:21 AM Creatinine 1.51 mg/dL;  8:01 AM Creatinine 1.45 mg/dL    Recent Vancomycin Level(s) for last 3 days  12/15/2017:  4:21 AM Vancomycin Level 13.3 mg/L      Vancomycin IV Administrations (past 72 hours)                   vancomycin (VANCOCIN) 1,500 mg in NaCl 0.9 % 250 mL intermittent infusion (mg) 1,500 mg New Bag 17 1531                Nephrotoxins and other renal medications (Future)    Start     Dose/Rate Route Frequency Ordered Stop    12/15/17 1200  vancomycin (VANCOCIN) 1,250 mg in NaCl 0.9 % 250 mL intermittent infusion      1,250 mg  over 90 Minutes Intravenous EVERY 24 HOURS 12/15/17 1133      17 2045  furosemide (LASIX) 100 mg in NaCl 0.9 % 100 mL infusion      10 mg/hr  10 mL/hr  Intravenous CONTINUOUS 17 1015  vasopressin (PITRESSIN) 40 Units in D5W 40 mL infusion      2.4 Units/hr  2.4 mL/hr  Intravenous CONTINUOUS 17 1014      17 0130  norepinephrine (LEVOPHED) 16 mg in D5W 250 mL infusion      0.03-0.6 mcg/kg/min × 62.3 kg  1.8-35 mL/hr  Intravenous CONTINUOUS 17 0124            Contrast Orders - past 72 hours     None                Plan:  1.  Start vancomycin  1250 mg (~18 mg/kg) IV q24h.   2.  Goal Trough Level: 15-20 mg/L   3.  Pharmacy will check trough levels as appropriate in 1-3 Days.    4. Serum creatinine levels  will be ordered daily for the first week of therapy and at least twice weekly for subsequent weeks.    5. [place] method utilized to dose vancomycin therapy: Method 2    Carlos Enrique Cunningham, JaclynD

## 2017-12-15 NOTE — PROGRESS NOTES
[facility]      MICU Progress Note  Ana Laura Wharton MRN: [id number]  1970  Date of Admission:12/13/2017  Primary care provider: Ander Fuchs      Assessment and Plan:     Ana Laura Wharton is a 46 yo female with hx of bipolar disorder with depression, polysubstance abuse, & prior suicide attempts, admitted to [facility] MICU  Following calcium channel blocker (CCB) overdose, tylenol overdose & alcohol overdose. Course complicated by lactic acidosis, hypoventilation, acute renal failure, & severe hypotension/vasogenic shock. Currently intubated, sedated & paralyzed.    NEURO  Sedation/Analgesia:  Ativan gtt + additional 1-2mg q1hr as needed  Fentanyl gtt  Precedex gtt discontinued due to bradycardia & hypotension  Paralytic: cisatracurium gtt    Bipolar I disorder with anxiety & depression  Suicide attempt with polysubstance abuse (amlodipine, nyquil (acetaminophen), alcohol (vodka))  - Manage OD as per below  - Poison control recs: ical @10, may take days-week to clear  - Will monitor for withdrawal symptoms once weaned from sedation (ETOH 0.17 at OSH)  - Holding home zyprexa & remeron. If agitated, will increase ativan  - Psych consult for OD    RESPIRATORY  Vent Settings:  Ventilation Mode: CMV/AC  FiO2 (%): 90 %  Rate Set (breaths/minute): 27 breaths/min  Tidal Volume Set (mL): 550 mL  PEEP (cm H2O): 10 cmH2O  Oxygen Concentration (%): 55 %  Resp: 27    Hypoxic Respiratory Failure: Due initially to hypoventilation due to overdose, now due to V/Q mismatch in the setting of Pulm Edema  - Vent settings as per above. Patient auto-peeping at 10-12. Peaks in the 40s  - Titrate O2 to sats >90%, q4 ABG    CARDIOVASCULAR  Hypotension/Vasodilatory shock/Vasoplegia: Requiring pressors. Side effect of CCB overdose. ECHO shows normal EF of 70%. Normal RV function.  - NE gtt, titrate to MAP >60  - VAP gtt if needed  - Bicarbonate gtt discontinued as pH >7.25  - Calcium chloride gtt, iCal goal of 10 was reached  -  Vitamin C (given methemoglobin of 2.6%, Stress dose steroids (50mg q6 IV methylpred), Thiamine 200mg BID  - Cardiology consulted, no indication for Beyer currently    Bradycardia: Initially presented with bradycardia. Started on insulin gtt, titrated to 10U/kg/hr with dextrose  - Continue D20/Insulin gtt  - Management of hypotension as per above    RENAL / FLUIDS / ELECTROLYTES  Baseline creatinine: 0.7-0.8. S/p 7L IVF    Acute Renal Failure: Due to CCB overdose. Volume overload, pulmonary edema. Cr slowly improving with diuresis & forward flow  - Lasix gtt discontinued as fluid even for this admission. Intermittent IV lasix as needed  - I/Os, Monitor Cr, UOP  - Monitory lytes, replace    Lactic Acidosis/AGMA: 6-8. Due to hypoperfusion. Lactate may continue to rise until after CCB has cleared  - Continue to trend  - Goal of pH>7.25, bicarb gtt as necessary    GASTROINTESTINAL  MELD-Na score: 13 at 12/15/2017  4:14 PM  MELD score: 13 at 12/15/2017  4:14 PM  Calculated from:  Serum Creatinine: 1.27 mg/dL at 12/15/2017  4:14 PM  Serum Sodium: 154 mmol/L (Rounded to 137) at 12/15/2017  4:14 PM  Total Bilirubin: 0.8 mg/dL (Rounded to 1) at 12/15/2017  4:21 AM  INR(ratio): 1.42 at 12/15/2017  4:14 PM  Age: 47 years    Acetaminophen overdose  Elevated Transaminases  Found next to bottle of Nyquil. AST/ALTs now normal s/p NAC gtt.   - Trend LFTs, INR    OG: OG to LIS    Hx of C.diff: Hx of fecal transplant. Has had formed stool. Enteric precautions    Tube Feeds: Re-evaluate daily    INFECTIOUS DISEASE     Sepsis ppx  - Started on Vanc/Cefepime in the setting of profound leukocytosis & hypotension. Will treat for 48 hours    Cultures:  UA 12/13 negative  Blood Cx 12/13 NGTD    Antibiotics:  Vancomycin (12/14->)  Cefepime (12/14->)    HEMATOLOGY  Leukocytosis: Likely stress due to overdose. On ppx abx. Now on steroids. CRP & procal wnl  - Monitor daily     Anemia: Normocytic Anemia. In part dilutional from s/p 6L IVF  -  Monitor daily    ENDOCRINE  On insulin for CBB overdose, monitoring sugars    Hypothyroidism: Holding home levothyroxine due to no PO    SKIN CARE  No acute concerns    Prophylaxis: DVT: SCDs: GI: PPI  Code status: Full  Family:  updated by phone  Lines: RIJ, RAL, PIV x3    Patient was seen and discussed with attending physician Dr. Augustine, MICU attending    Osei Calabrese MD  Internal Medicine/Pediatrics, PGY-3    MICU STAFF CRITICAL CARE PROGRESS NOTE:    I saw and examined the patient with the MICU team and concur with findings and A&P as detailed in Dr. Calabrese's note of today    See my independent note of today    Sly Augustine MD  MICU Staff  [id number]    Interval History:     Patient with persistent acidosis last PM, improved with bicarbonate gtt. Calcium chloride gtt initiated with icals >10. Was paralyzed last night due to poor ventilation/compliance. Patient with improved respiratory response to diuretics as pulmonary edema improved. Briefly weaned off pressors this AM, has had intermittent needs. Hypokalemia today in the setting of aggressive diuresis. Insulin gtt momentarily held & will be restarted with K is at 4. Intermittently febrile. Negative cultures.     4 Point Review of systems including CV, Respiratory, GI and  were negative unless otherwise noted.    PHYSICAL EXAM  Temp:  [98.3  F (36.8  C)-101.9  F (38.8  C)] 98.6  F (37  C)  Heart Rate:  [] 98  Resp:  [23-29] 27  MAP:  [53 mmHg-83 mmHg] 74 mmHg  Arterial Line BP: ()/(41-67) 123/55  FiO2 (%):  [90 %-100 %] 90 %  SpO2:  [86 %-100 %] 100 %  Ventilation Mode: CMV/AC  FiO2 (%): 90 %  Rate Set (breaths/minute): 27 breaths/min  Tidal Volume Set (mL): 550 mL  PEEP (cm H2O): 10 cmH2O  Oxygen Concentration (%): 55 %  Resp: 27  Vitals:    12/14/17 0000 12/15/17 0500   Weight: 62.3 kg (137 lb 5.6 oz) 68.3 kg (150 lb 9.2 oz)       Intake/Output Summary (Last 24 hours) at 12/15/17 1916  Last data filed at 12/15/17 1900   Gross per 24 hour    Intake          6207.28 ml   Output            49961 ml   Net         -4467.72 ml     GEN: Sedated & intubated  EYES: Closed  CV: RRR, no murmurs, peripheral pulses  PULM: Coarse breath sounds bilaterally, no wheezing  GI: bowel sounds absent, soft but firm abdomen  : Napoles catheter in place  EXTREMITIES: Reticular pattern on bilateral LE, 1+ edema of upper extremities  NEURO: sedated, non-responsive  SKIN: No lesions    DIAGNOSTIC STUDIES  ROUTINE ICU LABS (Last four results)  CMP  Recent Labs  Lab 12/15/17  1812 12/15/17  1614 12/15/17  1434 12/15/17  1154 12/15/17  0801 12/15/17  0421  12/14/17  1600  12/14/17  0429  12/13/17  2356   NA  --  154*  --  152* 152* 152*  < > 148*  < > 144  --  142   POTASSIUM 3.6 3.4 2.6* 3.0* 3.3* 3.2*  < > 3.4  < > 3.4  < > 2.6*   CHLORIDE  --  113*  --  112* 114* 116*  < > 114*  < > 116*  --  115*   CO2  --  35*  --  30 30 27  < > 17*  < > 12*  --  12*   ANIONGAP  --  5  --  10 9 9  < > 17*  < > 16*  --  15*   GLC  --  162*  --  206* 167* 192*  < > 142*  < > 162*  --  278*   BUN  --  17  --  17 16 17  < > 18  < > 17  --  17   CR  --  1.27*  --  1.42* 1.45* 1.51*  < > 1.43*  < > 1.20*  --  1.13*   GFRESTIMATED  --  45*  --  40* 39* 37*  < > 39*  < > 48*  --  52*   GFRESTBLACK  --  55*  --  48* 47* 45*  < > 48*  < > 58*  --  62   ISAURO  --  16.6*  --  16.0* 16.2* 17.1*  < > 11.1*  < > 11.0*  --  11.0*   MAG  --  1.7  --  2.4* 1.5* 1.6  < >  --   < > 1.3*  --   --    PHOS  --  2.4*  --  3.3 3.8 4.6*  < > 5.3*  < > 1.8*  --  0.7*   PROTTOTAL  --   --   --   --   --  3.9*  --  4.4*  --  5.0*  --  4.9*   ALBUMIN  --   --   --   --   --  1.6*  --  2.0*  --  2.6*  --  2.5*   BILITOTAL  --   --   --   --   --  0.8  --  0.4  --  0.6  --  0.6   ALKPHOS  --   --   --   --   --  114  --  131  --  145  --  149   AST  --   --   --   --   --  73*  --  67*  --  96*  --  102*   ALT  --   --   --   --   --  43  --  64*  --  82*  --  86*   < > = values in this interval not displayed.  CBC    Recent  Labs  Lab 12/15/17  0421 12/14/17  2150 12/14/17  1200 12/14/17  0429   WBC 35.3* 30.3* 36.5* 30.3*   RBC 3.74* 3.39* 3.26* 3.48*   HGB 11.7 10.3* 9.9* 10.7*   HCT 36.7 32.8* 30.6* 32.8*   MCV 98 97 94 94   MCH 31.3 30.4 30.4 30.7   MCHC 31.9 31.4* 32.4 32.6   RDW 16.7* 17.1* 16.9* 16.8*   * 152 212 237     INR    Recent Labs  Lab 12/15/17  1614 12/15/17  1154 12/15/17  0801 12/15/17  0421   INR 1.42* 1.55* 1.52* 1.42*     Arterial Blood Gas    Recent Labs  Lab 12/15/17  1613 12/15/17  1154 12/15/17  0801 12/15/17  0421   PH 7.51* 7.42 7.42 7.30*   PCO2 45 54* 48* 55*   PO2 87 77* 66* 93   HCO3 36* 35* 32* 27   O2PER 55%  50% 55 55 90.0       Hepatic Studies:   Recent Labs   Lab Test  12/15/17   0421  12/14/17   1600  12/14/17   0429   AST  73*  67*  96*   ALT  43  64*  82*   ALKPHOS  114  131  145   BILITOTAL  0.8  0.4  0.6   ALBUMIN  1.6*  2.0*  2.6*     Heme Studies:   Recent Labs   Lab Test  12/15/17   1614  12/15/17   1154  12/15/17   0801  12/15/17   0421   12/14/17 2150   12/14/17   1200   12/13/17   2356   WBC   --    --    --   35.3*   --   30.3*   --   36.5*   < >  19.6*   ANEU   --    --    --   32.1*   --    --    --    --    --   16.2*   ALYM   --    --    --   2.4   --    --    --    --    --   1.8   AEOS   --    --    --   0.0   --    --    --    --    --   0.0   HGB   --    --    --   11.7   --   10.3*   --   9.9*   < >  10.4*   MCV   --    --    --   98   --   97   --   94   < >  93   PLT   --    --    --   119*   --   152   --   212   < >  216   INR  1.42*  1.55*  1.52*  1.42*   < >   --    < >   --    < >  1.56*    < > = values in this interval not displayed.     Iron Studies:   Recent Labs   Lab Test  03/12/17   0657   IRON  38   FEB  221*   IRONSAT  17     Urine Studies:   Recent Labs   Lab Test  12/15/17   0629  12/15/17   0511   SG  1.005  Canceled, Test credited   URINEPH  5.0  Canceled, Test credited   NITRITE  Negative  Canceled, Test credited*   LEUKEST  Negative  Canceled, Test  credited*   WBCU  <1  Canceled, Test credited*   RBCU  <1  Canceled, Test credited   PROTEIN  Negative  Canceled, Test credited*       Microbiology:  No results found for: RS, FLUAG    Recent Labs   Lab Test  12/15/17   0428  12/15/17   0145  12/13/17   1750  10/02/17   0103  03/09/17   1740  01/24/17   0622  10/11/16   0615  04/10/14   1430   CULT  No growth after 10 hours  Canceled, Test credited  Test canceled by PCU/Clinic  PER RN GAYLE S     --   50,000 to 100,000 colonies/mL  Enterococcus faecalis  *  <10,000 colonies/mL  urogenital mina  Susceptibility testing not routinely done     --   50,000 to 100,000 colonies/mL Proteus mirabilis*  Canceled, Test credited Duplicate request   --   No Salmonella, Shigella, Campylobacter, E. coli O157, Aeromonas, or Plesiomonas   isolated.     SDES  Blood White port  Blood  Nares  Midstream Urine  Feces  Midstream Urine  Unspecified Urine  Feces  Feces       Imaging:  Recent Results (from the past 24 hour(s))   XR Chest Port 1 View    Narrative    XR CHEST PORT 1 VW, 12/14/2017 7:14 PM.    Comparison: 12/14/2017.    History: Intubated, pulmonary edema; .    Findings:   Endotracheal tube projects over the high thoracic trachea. Gastric  tube courses toward stomach, tip not visualized, sidehole projecting  likely just beyond the gastroesophageal junction. Right IJ  catheter  tip projects over the low SVC. Increased diffuse patchy airspace  opacities with a perihilar predominance. Slightly decreased left  retrocardiac opacity. Small bilateral pleural and effusions bibasilar  opacities. No pneumothorax.       Impression    IMPRESSION:   1. Stable support devices.  2. Increased diffuse patchy airspace opacities with a perihilar  predominance, which may represent pulmonary edema or atypical  infection.  3. Decreased pleural effusions with associated bibasilar  atelectasis/consolidation. Slightly decreased dense left retrocardiac  opacity.    I have personally reviewed the  examination and initial interpretation  and I agree with the findings.    ZOE PINON MD   XR Chest Port 1 View    Narrative    Exam:  Chest X-ray 12/15/2017 11:29 AM    History: Intubated, pulmonary edema;     Comparison: Chest x-ray dated 12/14/2017    Findings: AP portable chest x-ray at 45 degrees. The endotracheal tube  tip is approximately 5.3 cm above the coleman. Right IJ central venous  catheter with the tip projecting over the low SVC. Partially  visualized enteric tube with the sidehole projecting over the stomach.  The cardiac silhouette is stable. Redemonstration of diffuse patchy  airspace opacities. Surgical changes of left upper lobe wedge  resection. No pneumothorax. The upper abdomen is unremarkable.      Impression    Impression: Stable to slightly increase diffuse airspace opacities,  atypical infection versus pulmonary edema.    I have personally reviewed the examination and initial interpretation  and I agree with the findings.    DAVID VANCE MD

## 2017-12-15 NOTE — PHARMACY-VANCOMYCIN DOSING SERVICE
Pharmacy Vancomycin Note  Date of Service December 15, 2017  Patient's  1970   47 year old, female    Indication: Sepsis and leukocytosis  Goal Trough Level: 15-20 mg/L  Day of Therapy: D2  Current Vancomycin regimen:  1500 mg IV once    Current estimated CrCl = CrCl cannot be calculated (Unknown ideal weight.).    Creatinine for last 3 days  2017: 11:48 AM Creatinine 1.22 mg/dL;  8:35 PM Creatinine 1.09 mg/dL; 11:56 PM Creatinine 1.13 mg/dL  2017:  4:29 AM Creatinine 1.20 mg/dL;  8:47 AM Creatinine 1.17 mg/dL; 12:00 PM Creatinine 1.30 mg/dL;  4:00 PM Creatinine 1.43 mg/dL;  8:02 PM Creatinine 1.58 mg/dL  12/15/2017: 12:02 AM Creatinine 1.62 mg/dL;  4:21 AM Creatinine 1.51 mg/dL    Recent Vancomycin Levels (past 3 days)  12/15/2017:  4:21 AM Vancomycin Level 13.3 mg/L    Vancomycin IV Administrations (past 72 hours)                   vancomycin (VANCOCIN) 1,500 mg in NaCl 0.9 % 250 mL intermittent infusion (mg) 1,500 mg New Bag 17 1531                Nephrotoxins and other renal medications (Future)    Start     Dose/Rate Route Frequency Ordered Stop    17 204  furosemide (LASIX) 100 mg in NaCl 0.9 % 100 mL infusion      10 mg/hr  10 mL/hr  Intravenous CONTINUOUS 17 1130  phenylephrine (LETA-SYNEPHRINE) 50 mg in NaCl 0.9 % 250 mL infusion      0.5-6 mcg/kg/min × 62.3 kg (Dosing Weight)  9.3-112.1 mL/hr  Intravenous CONTINUOUS 17 1126      17 1015  vasopressin (PITRESSIN) 40 Units in D5W 40 mL infusion      2.4 Units/hr  2.4 mL/hr  Intravenous CONTINUOUS 17 1014      17 0130  norepinephrine (LEVOPHED) 16 mg in D5W 250 mL infusion      0.03-0.6 mcg/kg/min × 62.3 kg  1.8-35 mL/hr  Intravenous CONTINUOUS 17 0124               Contrast Orders - past 72 hours     None          Interpretation of levels and current regimen:  Trough level is  Subtherapeutic - after only 1 loading dose    Has serum creatinine changed > 50% in last 72  hours: No    Urine output:  good urine output - after initiation of Lasix gtt    Renal Function: EDUARDO    Plan:  1.  Vancomycin discontinued in the meantime as there is low suspicion for infection    Carlos Enrique Cunningham, PharmD

## 2017-12-15 NOTE — PLAN OF CARE
Problem: Patient Care Overview  Goal: Plan of Care/Patient Progress Review  D: Patient here with overdose of calcium channel blockers  I/A: Patient went unresponsive at 0715 this morning after being extubated at 0400. Re-intubated at 0730. On 100% sating in the low 80s. Vent changes done, abgs sent every 4hours, lactic acids sent every four. Remains on high dose insulin and d50. Hypotensive, started norepi and vaso. Napoles in place with low urine output. No stool and no bowel sounds. Gave 4 amps of bicarb for low ph, then started gtt. Started calcium chloride gtt, after 6g of ca gluc.   P: watch cardiac status and treat as needed. Continue poc

## 2017-12-15 NOTE — PROGRESS NOTES
[facility]      MICU Progress Note  Ana Laura Wharton MRN: [id number]  1970  Date of Admission:12/13/2017  Primary care provider: Ander Fuchs      Assessment and Plan:     Ana Laura Wharton is a 46 yo female with hx of bipolar disorder with depression, polysubstance abuse, & prior suicide attempts, admitted to [facility] MICU  Following calcium channel blocker (CCB) overdose, tylenol overdose & alcohol overdose. Course complicated by lactic acidosis, hypoventilation, acute renal failure, & severe hypotension/vasogenic shock. She is critically ill requiring mechanical ventilation & pressors.    NEURO  Sedation/Analgesia:  Ativan gtt + additional 1-2mg q1hr as needed  Fentanyl gtt  Precedex gtt discontinued due to bradycardia & hypotension    Bipolar I disorder with anxiety & depression  Suicide attempt with polysubstance abuse (amlodipine, nyquil (acetaminophen), alcohol (vodka))  - Manage OD as per below  - Poison control recs: ical @10, may take days-week to clear  - Will monitor for withdrawal symptoms once weaned from sedation (ETOH 0.17 at OSH)  - Holding home zyprexa & remeron. If agitated, will increase ativan  - Psych consult for OD    RESPIRATORY  Vent Settings:  Ventilation Mode: CMV/AC  FiO2 (%): 100 %  Rate Set (breaths/minute): 30 breaths/min  Tidal Volume Set (mL): 600 mL  PEEP (cm H2O): 5 cmH2O  Oxygen Concentration (%): 90 %  Resp: 32    Hypoxic Respiratory Failure: Due initially to hypoventilation due to overdose, now due to V/Q mismatch in the setting of Pulm Edema  - Vent settings as per above. Patient auto-peeping at 10-12. Peaks in the 40s  - Titrate O2 to sats >90%, q4 ABG    CARDIOVASCULAR  Hypotension/Vasodilatory shock/Vasoplegia: Requiring pressors. Side effect of CCB overdose. ECHO shows normal EF of 70%. Normal RV function.  - NE gtt, titrate to MAP >60  - VAP added today  - Bicarbonate gtt, goal of pH >7.25  - Calcium chloride gtt, iCal goal of 10, icals q2  - Vitamin C (given  methemoglobin of 2.6%, Stress dose steroids (50mg q6 IV methylpred), Thiamine 200mg BID  - Cardiology consult, no indication for [place] currently    Bradycardia: Initially presented with bradycardia. Started on insulin gtt, titrated to 10U/kg/hr with dextrose  - Continue D20/Insulin gtt  - Management of hypotension as per above    RENAL / FLUIDS / ELECTROLYTES  Baseline creatinine: 0.7-0.8. S/p 7L IVF    Acute Renal Failure: Due to CCB overdose. Volume overload, pulmonary edema. Cr 1.43 & uptrending  - Gentle diuresis tonight  - Renal consult in AM for CRRT given volume overload & pulmonary edema  - I/Os, Monitor Cr, UOP  - Monitory lytes, replace    Lactic Acidosis/AGMA: 6-8. Due to hypoperfusion. Lactate may continue to rise until after CCB has cleared  - Continue to trend  - Goal of pH>7.25, bicarb gtt to titrate    GASTROINTESTINAL  MELD-Na score: 13 at 12/14/2017 12:35 PM  MELD score: 13 at 12/14/2017 12:35 PM  Calculated from:  Serum Creatinine: 1.30 mg/dL at 12/14/2017 12:00 PM  Serum Sodium: 146 mmol/L (Rounded to 137) at 12/14/2017 12:00 PM  Total Bilirubin: 0.6 mg/dL (Rounded to 1) at 12/14/2017  4:29 AM  INR(ratio): 1.46 at 12/14/2017 12:35 PM  Age: 47 years    Acetaminophen overdose  Elevated Transaminases  AST/ALTs in the 100s. Found next to bottle of Nyquil  - Continue NAC protocol  - Trend LFTs, INR    OG: OG to LIS    Hx of C.diff: Hx of fecal transplant. Has had formed stool. Enteric precautions    Tube Feeds: Re-evaluate daily    INFECTIOUS DISEASE     Sepsis ppx  - Started on Vanc/Cefepime in the setting of profound leukocytosis & hypotension    Cultures:  UA 12/13 negative  Blood Cx 12/13 NGTD    Antibiotics:  Vancomycin (12/14->)  Cefepime (12/14->)    HEMATOLOGY  Leukocytosis: Likely stress due to overdose. On ppx abx. Now on steroids. CRP & procal wnl  - Monitor daily     Anemia: Normocytic Anemia. In part dilutional from s/p 6L IVF  - Monitor daily    ENDOCRINE  On insulin for CBB overdose,  monitoring sugars    Hypothyroidism: Holding home levothyroxine due to no PO    SKIN CARE  No acute concerns    Prophylaxis: DVT: SCDs: GI: PPI  Code status: Full  Family:  updated by phone  Lines: RIJ, RAL, PIV x3    Patient was seen and discussed with attending physician Dr. Augustine, MICU attending    Osei Calabrese MD  Internal Medicine/Pediatrics, PGY-3      MICU STAFF CRITICAL CARE PROGRESS NOTE:    I saw and examined the patient with the MICU team and concur with findings and A&P as detailed in Dr. Calabrese's note of today    See my independent    Sly Augustine MD  MICU Staff  [id number]    Interval History:     Patient extubated briefly this AM & agitated. Speaking in incomplete, nonsensical phrases. Briefly became tachypneic, then unresponsive to sternal rub. Re-intubation achieved. Has been persistently acidotic & hypotensive today.  called & aware.     4 Point Review of systems including CV, Respiratory, GI and  were negative unless otherwise noted.    PHYSICAL EXAM  Temp:  [96.7  F (35.9  C)-99.3  F (37.4  C)] 99.3  F (37.4  C)  Heart Rate:  [] 91  Resp:  [0-33] 32  BP: ()/(44-55) 106/55  MAP:  [52 mmHg-266 mmHg] 64 mmHg  Arterial Line BP: ()/() 110/49  FiO2 (%):  [30 %-100 %] 100 %  SpO2:  [59 %-100 %] 90 %  Ventilation Mode: CMV/AC  FiO2 (%): 100 %  Rate Set (breaths/minute): 30 breaths/min  Tidal Volume Set (mL): 600 mL  PEEP (cm H2O): 5 cmH2O  Oxygen Concentration (%): 90 %  Resp: 32  Vitals:    12/14/17 0000   Weight: 62.3 kg (137 lb 5.6 oz)     Intake/Output Summary (Last 24 hours) at 12/14/17 1846  Last data filed at 12/14/17 1800   Gross per 24 hour   Intake          4598.33 ml   Output              755 ml   Net          3843.33 ml     GEN: Sedated & intubated  EYES: Pupils sluggish but equal & reactive  CV: RRR, no murmurs, peripheral pulses  PULM: Coarse breath sounds bilaterally, equal breath sounds bilaterally  GI: bowel sounds absent, firm,   : Napoles  catheter in place  EXTREMITIES: Reticular pattern on bilateral LE, 1+ edema of upper extremities  NEURO: sedated, non-responsive  SKIN: No lesions    DIAGNOSTIC STUDIES  ROUTINE ICU LABS (Last four results)  CMP  Recent Labs  Lab 12/14/17  1200 12/14/17  0847 12/14/17  0429 12/14/17  0216 12/13/17 2356  12/13/17  1836 12/13/17  1148   * 145* 144  --  142  < >  --  140   POTASSIUM 3.5 3.6 3.4 3.1* 2.6*  < >  --  2.7*   CHLORIDE 115* 117* 116*  --  115*  < >  --  105   CO2 15* 16* 12*  --  12*  < >  --  20   ANIONGAP 15* 12 16*  --  15*  < >  --  15*   * 190* 162*  --  278*  < >  --  107*   BUN 17 16 17  --  17  < >  --  18   CR 1.30* 1.17* 1.20*  --  1.13*  < >  --  1.22*   GFRESTIMATED 44* 50* 48*  --  52*  < >  --  47*   GFRESTBLACK 53* 60* 58*  --  62  < >  --  57*   ISAURO 10.8* 9.7 11.0*  --  11.0*  < >  --  9.6   MAG 2.0 2.1 1.3*  --   --   --   --   --    PHOS 4.4  --  1.8*  --  0.7*  --   --   --    PROTTOTAL  --   --  5.0*  --  4.9*  --  5.2* 7.6   ALBUMIN  --   --  2.6*  --  2.5*  --  2.5* 4.0   BILITOTAL  --   --  0.6  --  0.6  --  0.6 1.0   ALKPHOS  --   --  145  --  149  --  172* 281*   AST  --   --  96*  --  102*  --  128* 273*   ALT  --   --  82*  --  86*  --  106* 152*   < > = values in this interval not displayed.  CBC  Recent Labs  Lab 12/14/17  1200 12/14/17  0429 12/13/17  2356 12/13/17  1148   WBC 36.5* 30.3* 19.6* 15.6*   RBC 3.26* 3.48* 3.47* 4.64   HGB 9.9* 10.7* 10.4* 14.3   HCT 30.6* 32.8* 32.2* 41.9   MCV 94 94 93 90   MCH 30.4 30.7 30.0 30.8   MCHC 32.4 32.6 32.3 34.1   RDW 16.9* 16.8* 16.9* 17.1*    237 216 390     INR  Recent Labs  Lab 12/14/17  1235 12/14/17  0429 12/13/17  2356 12/13/17  1148   INR 1.46* 1.49* 1.56* 1.05     Arterial Blood Gas  Recent Labs  Lab 12/14/17  1800 12/14/17  1600 12/14/17  1403 12/14/17  1230 12/14/17  1200   PH 7.23* 7.19* 7.23*  --  7.22*   PCO2 49* 43 37  --  33*   PO2 59* 88 68*  --  73*   HCO3 20* 16* 15*  --  14*   O2PER 90 100 75   75 85 Canceled, Test credited  100       Hepatic Studies: Recent Labs   Lab Test  12/14/17   0429  12/13/17   2356  12/13/17   1836   AST  96*  102*  128*   ALT  82*  86*  106*   ALKPHOS  145  149  172*   BILITOTAL  0.6  0.6  0.6   ALBUMIN  2.6*  2.5*  2.5*     Heme Studies: Recent Labs   Lab Test  12/14/17   1235  12/14/17   1200  12/14/17   0429  12/13/17   2356  12/13/17   1148   WBC   --   36.5*  30.3*  19.6*  15.6*   ANEU   --    --    --   16.2*  7.6   ALYM   --    --    --   1.8  5.1   AEOS   --    --    --   0.0  0.5   HGB   --   9.9*  10.7*  10.4*  14.3   MCV   --   94  94  93  90   PLT   --   212  237  216  390   INR  1.46*   --   1.49*  1.56*  1.05     Iron Studies: Recent Labs   Lab Test  03/12/17   0657   IRON  38   FEB  221*   IRONSAT  17     Urine Studies: Recent Labs   Lab Test  12/13/17   1208  10/02/17   0103   SG  1.006  1.017   URINEPH  6.0  6.0   NITRITE  Negative  Negative   LEUKEST  Negative  Large*   WBCU  2  >182*   RBCU  3*  63*   PROTEIN  Negative  30*       Microbiology:  No results found for: RS, FLUAG    Recent Labs   Lab Test  12/13/17   1750  10/02/17   0103  03/09/17   1740  01/24/17   0622  10/11/16   0615  04/10/14   1430  04/05/14   1228   CULT   --   50,000 to 100,000 colonies/mL  Enterococcus faecalis  *  <10,000 colonies/mL  urogenital mina  Susceptibility testing not routinely done     --   50,000 to 100,000 colonies/mL Proteus mirabilis*  Canceled, Test credited Duplicate request   --   No Salmonella, Shigella, Campylobacter, E. coli O157, Aeromonas, or Plesiomonas   isolated.     --    SDES  Nares  Midstream Urine  Feces  Midstream Urine  Unspecified Urine  Feces  Feces  Urine       Imaging:  Recent Results (from the past 24 hour(s))   XR Chest Port 1 View    Narrative    XR CHEST PORT 1 VW 12/13/2017 11:13 PM    HISTORY: Endotracheal tube positioning;     COMPARISON: 12/13/2017    FINDINGS:   Endotracheal tube projects 2 cm above the coleman. Enteric tube  sidehole  projects over the stomach. Right IJ central venous catheter  with tip in the low SVC.    Cardiac silhouette and pulmonary vasculature are within normal limits.  Left apical pleural thickening status post pleurodesis no pneumothorax  or pleural effusion. Mild left retrocardiac opacities, likely  representing atelectasis.      Impression    IMPRESSION: Endotracheal tube projects 2 cm above the coleman.    I have personally reviewed the examination and initial interpretation  and I agree with the findings.    HUDSON WHITTAKER MD   XR Abdomen Port 1 View    Narrative    XR ABDOMEN PORT F1 VW 12/14/2017 6:27 AM    History: abdominal pain;     Comparison: CT abdomen pelvis 1/26/2017    Findings: Air-filled bowel in the mid abdomen. No pneumatosis or  portal venous gas. Right basilar opacities. Right base atelectasis and  possible small effusion.      Impression    Impression: Nonobstructive bowel gas pattern with nondilated loops of  bowel in the mid abdomen.     I have personally reviewed the examination and initial interpretation  and I agree with the findings.    ESTEE ARCINIEGA MD   XR Chest Port 1 View   Result Value    Radiologist flags Right mainstem bronchus (Urgent)    Narrative    EXAM: XR CHEST PORT 1 VW  12/14/2017 9:10 AM      HISTORY: Intubated    COMPARISON: Radiograph 12/13/2017    FINDINGS: AP radiograph of the chest. Endotracheal tube tip projects  over the right mainstem bronchus. Right IJ central venous catheter tip  projects over the low SVC. Cardiac silhouette is unremarkable. Small  pleural effusions, new from prior. New perihilar opacities. No  pneumothorax.       Impression    IMPRESSION:   1. Endotracheal tube tip projects over the right mainstem bronchus.  2. New perihilar opacities, likely pulmonary edema. Additional  considerations include atypical infection or atelectasis.  3. New small pleural effusions.    [Urgent Result: Right mainstem bronchus]    Finding was identified on 12/14/2017 9:23  AM.     MICU resident was contacted by Dr. Juan Juares at 12/14/2017 9:26 AM  and verbalized understanding of the urgent finding.     I have personally reviewed the examination and initial interpretation  and I agree with the findings.    DAVID VANCE MD   XR Abdomen Port 1 View    Narrative    Examination:  XR ABDOMEN PORT F1 VW 12/14/2017 10:11 AM     Comparison: 12/14/2017    History: Og placement;     Findings: There is a single supine view of the abdomen . There is  gaseous distention of the stomach with a gastric tube tip projecting  over the fundus, sidehole likely beyond the gastroesophageal junction.  The small intestine and colon are not distended with scattered gas in  an overall nonobstructive pattern.  No evidence of pneumatosis. No  portal venous gas.    Dense retrocardiac/left basilar opacity with findings of volume loss,  concerning for atelectasis/collapse given prior right bronchial  intubation.      Impression    Impression:   1. Gaseous distention of the stomach. Gastric tube tip projecting over  the stomach with sidehole just distal to the GE junction.  2. Partially visualized dense retrocardiac/left basilar opacity with  findings of volume loss, concerning for atelectasis/lobar collapse  especially given right bronchial intubation earlier the same date.    I have personally reviewed the examination and initial interpretation  and I agree with the findings.    ZOE PINON MD   XR Chest Port 1 View    Narrative    EXAM: XR CHEST PORT 1 VW  12/14/2017 10:14 AM      HISTORY: Endotracheal tube retracted    COMPARISON: Radiograph 12/14/2017    FINDINGS: AP radiograph of the chest. Endotracheal tube projects 5 cm  above the coleman. Gastric tube side port projects over the stomach.  Right IJ [place] catheter tip projects over the low SVC. Stable  perihilar opacities. Stable small pleural effusions with associated  bibasilar atelectasis/consolidation.      Impression    IMPRESSION:   1.  Endotracheal tube projects over the midthoracic trachea. Gastric  tube side port projects over the stomach.  2. Stable perihilar opacities, likely pulmonary edema. Again, atypical  infection or atelectasis cannot be excluded.  3. Stable small pleural effusions with associated bibasilar  atelectasis/consolidation.    I have personally reviewed the examination and initial interpretation  and I agree with the findings.    DAVID VANCE MD

## 2017-12-15 NOTE — PROGRESS NOTES
MICU STAFF CRITICAL CARE PROGRESS NOTE:    I saw and examined the patient with the MICU team and concur with findings and A&P as detailed in Dr. Calabrese's note of today\    Events overnight reviewed - notable for worsening acute respiratory failure with pulm edema and very low Vt on vent and 12-15 auto-PEEP leading to paralysis with cis-Atracurium  On pressors most of time, off transiently this AM but now back on    Febrile to 101.9  HR  RR 27-33 MAP>60 on pressors  ACMV 27/27-32  550  Set PEEP 15 55%  Train of 4 0/0  Intubated not moving ventilated woman  Edematous  Coarse bilateral BS  RRR no m/g/r  Firm abd w/o BS  On ativan and fentanyl with prn ativan boluses    Hgb 11.7 WBC 35 Plt 119 INR 1.3-1.4  Na 152 K 3.2 HCO3 27  Creat 1.5 (was up to 1.7)  Davidson 10.2 (total 17)  Lactate 4-5  Nl fibrinogen  ABG 7.30/55/93 and most recent is 7.42/48/66  CXR scattered bilat densities    Hypotension / Shock due to Vasoplegia  / Ca Channel Blocker OD  Nl cardiac function on echo  Improved catecholamine function with partial correction of acidemia  P:  Maintain Davidson  @  10 - will discuss duration with Posion Control  Continue stress steroids for now  Continue empiric broad spectrum abx for remote chance of sepsis (Vanco & Cefipime)    Hyperglycemia   Insulin drip    Acute Hypoxic Respiratory Failure / Hypervolemia / Pulmonary edema  Now making urine with lasix drip (6L since midnight) but still up from [facility] and here (+ 5L yesterday)  Trial of PEEP reduction from 15 to 10.  Needs ETT advancement    EDUARDO / Lactic Acidosis  Improving    Abnl LFTs / ? Tylenol OD  Completed NAC protocol and LFTs improving    (Critical Care 45 minutes cumulative thus far today)    Sly Augustine MD  MICU Staff  [phone]

## 2017-12-15 NOTE — PROGRESS NOTES
"CLINICAL NUTRITION SERVICES - ASSESSMENT NOTE     Nutrition Prescription    RECOMMENDATIONS FOR MDs/PROVIDERS TO ORDER:  -If diet cannot advance in the next 1-2 days, would recommend starting nutrition support (enteral nutrition via OGT)  -If enteral nutrition is indicated, please send appropriate nutrition consult (RD to Assess/Order TF per MNT protocol)    Malnutrition Status:    Patient does not meet criteria necessary for diagnosing malnutrition    Recommendations already ordered by Registered Dietitian (RD):  None at this time    Future/Additional Recommendations:  1. If EN is indicated, would recommend feeding gastrically initially:  -TwoCal HN @ 35 mL/hr (840 mL/day) to provide 1680 kcals (27+ kcals/kg/day), 71 g PRO (1.1+ g/kg/day), 588 mL H2O, 184 g CHO and 4 g Fiber daily.  -1 packet ProSource TF BID to provide an additional 80 kcal and 22 g PRO to increase total provisions to 1760 kcal (28 kcal/kg) and 93 g PRO (1.5 g/kg)  -15 mL liquid MVI for micronutrient needs  -30 mL water flush q4h for tube patency     REASON FOR ASSESSMENT  Ana Laura Wharton is a/an 47 year old female assessed by the dietitian for LOS and NPO/Clear Liquids in ICU    NUTRITION HISTORY  Unable to obtain r/t pt intubated/sedated without family at the bedside.    Pt admitted for suicide attempt, so suspect PO intake poor PTA    CURRENT NUTRITION ORDERS  Diet: NPO  Intake/Tolerance: N/A    LABS  12/15:  Na = 152 (H)  K = 3.3 (L)  Mg = 1.5 (L)  Lactic Acid = 5.1 (H)    MEDICATIONS  Folic acid  Thiamin  Insulin  Levo    ANTHROPOMETRICS  Height: 5'7\"  Most Recent Weight: 68.3 kg (150 lb 9.2 oz)    IBW: 61.4 kg  BMI: 27; Overweight BMI 25-29.9  Weight History:   Wt Readings from Last 10 Encounters:   12/15/17 68.3 kg (150 lb 9.2 oz)   12/13/17 52.2 kg (115 lb)   10/02/17 45.4 kg (100 lb)   03/11/17 54.7 kg (120 lb 8 oz)   03/09/17 52.6 kg (116 lb)   01/24/17 54.4 kg (120 lb)   09/12/16 57.3 kg (126 lb 6.4 oz)   09/12/16 55.3 kg (122 lb) "   04/14/15 64.9 kg (143 lb)   03/31/15 62.6 kg (138 lb)     Dosing Weight: 62 kg (lowest admit wt of 62.3 kg on 12/14)    ASSESSED NUTRITION NEEDS  Estimated Energy Needs: 7695-0398+ kcals/day (25 - 30 kcals/kg)  Justification: Maintenance  Estimated Protein Needs: 74-93 grams protein/day (1.2 - 1.5 grams of pro/kg)  Justification: Increased needs  Estimated Fluid Needs: (1 mL/kcal)   Justification: Per provider pending fluid status    PHYSICAL FINDINGS  See malnutrition section below.  No abnormal nutrition-related physical findings observed.     MALNUTRITION  % Intake: Unable to assess  % Weight Loss: Weight loss does not meet criteria  Subcutaneous Fat Loss: None observed  Muscle Loss: None observed  Fluid Accumulation/Edema: None noted  Malnutrition Diagnosis: Patient does not meet two of the above criteria necessary for diagnosing malnutrition    NUTRITION DIAGNOSIS  Inadequate protein-energy intake related to suicide attempt requiring intubation as evidenced by NPO day 2 without plan to start nutrition      INTERVENTIONS  Implementation  Nutrition Education: Unable to complete due to pt intubated/sedated   Collaboration with other providers - MICU rounds  Enteral Nutrition - Recs     Goals  Diet adv v nutrition support within 1-2 days.  Total avg nutritional intake to meet a minimum of 25 kcal/kg and 1.2 g PRO/kg daily (per dosing wt 62 kg).     Monitoring/Evaluation  Progress toward goals will be monitored and evaluated per protocol.    Spring Rodriguez RDN, LD  Pgr: [id number]

## 2017-12-15 NOTE — PLAN OF CARE
Problem: Restraint for Non-Violent/Non-Self-Destructive Behavior  Goal: Prevent/Manage Potential Problems  Maintain safety of patient and others during period of restraint.  Promote psychological and physical wellbeing.  Prevent injury to skin and involved body parts.  Promote nutrition, hydration, and elimination.   Patient wakes up and pulls at lines, requiring restraints. MD aware, order obtained.

## 2017-12-15 NOTE — PLAN OF CARE
"Problem: Overdose, Ingestion/Inhalants (Adult)  Goal: Signs and Symptoms of Listed Potential Problems Will be Absent, Minimized or Managed (Overdose, Ingestion/Inhalants)  Signs and symptoms of listed potential problems will be absent, minimized or managed by discharge/transition of care (reference Overdose, Ingestion/Inhalants (Adult) CPG).   Outcome: Improving  D: CCB overdose    IA: At beginning of shift pt was noted to be \"guppy\" breathing and profoundly dsynchronous with vent. ABGs looked increasingly alkalotic due to high Co2. Circuit and filters constantly needing to be change r/t copious amounts of pink frothy secretions. Vent constantly alarming for high auto peep. MDs made aware during multiple conversations. Despite vent changes, decision made to paralyzed at 2300 to assist with ventilation. Lasix gtt, ca chloride gtt, cis gtt started. After vent changes, pt oxygenation, ventilation, and blood pressure drastically improved with pressors able to be titrated down quickly. Converted from acc junctional to NSR around 0500. Since increase in peep to 15, no frothy secretions noted. Excellent response to lasix, now net -900ml. FiO2 titrated down to 65% as of 0600. Cis titrated to keep TOF 2/4 twitches. Ca chloride gtt titrated to keep ical> 10. OG to LIS with 100ml of brown output. Please see flowsheets for multiple labs and titrations made.     P: Continue to titrated Fio2 and pressors down. Titrate everything off before titrating insulin gtt per poison control.       "

## 2017-12-15 NOTE — PROGRESS NOTES
Gabriel Santos, here today. Updated about patient condition.  was given patient's belongings including home meds, wallet with master card, [employer], Paul's card, watch, and 3 silver bands.

## 2017-12-16 ENCOUNTER — APPOINTMENT (OUTPATIENT)
Dept: CT IMAGING | Facility: CLINIC | Age: 47
DRG: 917 | End: 2017-12-16
Attending: INTERNAL MEDICINE
Payer: COMMERCIAL

## 2017-12-16 ENCOUNTER — APPOINTMENT (OUTPATIENT)
Dept: GENERAL RADIOLOGY | Facility: CLINIC | Age: 47
DRG: 917 | End: 2017-12-16
Attending: INTERNAL MEDICINE
Payer: COMMERCIAL

## 2017-12-16 LAB
ALBUMIN SERPL-MCNC: 1.4 G/DL (ref 3.4–5)
ALP SERPL-CCNC: 99 U/L (ref 40–150)
ALT SERPL W P-5'-P-CCNC: 30 U/L (ref 0–50)
ANION GAP SERPL CALCULATED.3IONS-SCNC: 4 MMOL/L (ref 3–14)
ANION GAP SERPL CALCULATED.3IONS-SCNC: 5 MMOL/L (ref 3–14)
ANION GAP SERPL CALCULATED.3IONS-SCNC: 6 MMOL/L (ref 3–14)
ANION GAP SERPL CALCULATED.3IONS-SCNC: 8 MMOL/L (ref 3–14)
ANION GAP SERPL CALCULATED.3IONS-SCNC: 9 MMOL/L (ref 3–14)
ANION GAP SERPL CALCULATED.3IONS-SCNC: 9 MMOL/L (ref 3–14)
ANISOCYTOSIS BLD QL SMEAR: SLIGHT
AST SERPL W P-5'-P-CCNC: 77 U/L (ref 0–45)
BASE EXCESS BLDA CALC-SCNC: 10 MMOL/L
BASE EXCESS BLDA CALC-SCNC: 10.1 MMOL/L
BASE EXCESS BLDA CALC-SCNC: 12.4 MMOL/L
BASE EXCESS BLDA CALC-SCNC: 13 MMOL/L
BASE EXCESS BLDA CALC-SCNC: 6.5 MMOL/L
BASE EXCESS BLDA CALC-SCNC: 9.2 MMOL/L
BASOPHILS # BLD AUTO: 0 10E9/L (ref 0–0.2)
BASOPHILS NFR BLD AUTO: 0.2 %
BILIRUB SERPL-MCNC: 1.2 MG/DL (ref 0.2–1.3)
BUN SERPL-MCNC: 19 MG/DL (ref 7–30)
BUN SERPL-MCNC: 20 MG/DL (ref 7–30)
BUN SERPL-MCNC: 21 MG/DL (ref 7–30)
BUN SERPL-MCNC: 21 MG/DL (ref 7–30)
BUN SERPL-MCNC: 22 MG/DL (ref 7–30)
BUN SERPL-MCNC: 23 MG/DL (ref 7–30)
CA-I BLD-MCNC: 6.3 MG/DL (ref 4.4–5.2)
CA-I BLD-MCNC: 6.6 MG/DL (ref 4.4–5.2)
CA-I BLD-MCNC: 7.3 MG/DL (ref 4.4–5.2)
CA-I BLD-MCNC: 7.7 MG/DL (ref 4.4–5.2)
CA-I BLD-MCNC: 8.2 MG/DL (ref 4.4–5.2)
CA-I BLD-MCNC: 8.4 MG/DL (ref 4.4–5.2)
CALCIUM SERPL-MCNC: 10.6 MG/DL (ref 8.5–10.1)
CALCIUM SERPL-MCNC: 11.5 MG/DL (ref 8.5–10.1)
CALCIUM SERPL-MCNC: 12 MG/DL (ref 8.5–10.1)
CALCIUM SERPL-MCNC: 13 MG/DL (ref 8.5–10.1)
CALCIUM SERPL-MCNC: 13.7 MG/DL (ref 8.5–10.1)
CALCIUM SERPL-MCNC: 14.3 MG/DL (ref 8.5–10.1)
CHLORIDE SERPL-SCNC: 108 MMOL/L (ref 94–109)
CHLORIDE SERPL-SCNC: 109 MMOL/L (ref 94–109)
CHLORIDE SERPL-SCNC: 110 MMOL/L (ref 94–109)
CHLORIDE SERPL-SCNC: 110 MMOL/L (ref 94–109)
CHLORIDE SERPL-SCNC: 111 MMOL/L (ref 94–109)
CHLORIDE SERPL-SCNC: 112 MMOL/L (ref 94–109)
CO2 SERPL-SCNC: 31 MMOL/L (ref 20–32)
CO2 SERPL-SCNC: 32 MMOL/L (ref 20–32)
CO2 SERPL-SCNC: 33 MMOL/L (ref 20–32)
CO2 SERPL-SCNC: 34 MMOL/L (ref 20–32)
CO2 SERPL-SCNC: 36 MMOL/L (ref 20–32)
CO2 SERPL-SCNC: 36 MMOL/L (ref 20–32)
CREAT SERPL-MCNC: 1.13 MG/DL (ref 0.52–1.04)
CREAT SERPL-MCNC: 1.14 MG/DL (ref 0.52–1.04)
CREAT SERPL-MCNC: 1.17 MG/DL (ref 0.52–1.04)
CREAT SERPL-MCNC: 1.18 MG/DL (ref 0.52–1.04)
CREAT SERPL-MCNC: 1.19 MG/DL (ref 0.52–1.04)
CREAT SERPL-MCNC: 1.19 MG/DL (ref 0.52–1.04)
DIFFERENTIAL METHOD BLD: ABNORMAL
EOSINOPHIL # BLD AUTO: 0 10E9/L (ref 0–0.7)
EOSINOPHIL NFR BLD AUTO: 0 %
ERYTHROCYTE [DISTWIDTH] IN BLOOD BY AUTOMATED COUNT: 17.2 % (ref 10–15)
FIBRINOGEN PPP-MCNC: 442 MG/DL (ref 200–420)
GFR SERPL CREATININE-BSD FRML MDRD: 49 ML/MIN/1.7M2
GFR SERPL CREATININE-BSD FRML MDRD: 50 ML/MIN/1.7M2
GFR SERPL CREATININE-BSD FRML MDRD: 51 ML/MIN/1.7M2
GFR SERPL CREATININE-BSD FRML MDRD: 52 ML/MIN/1.7M2
GLUCOSE BLDC GLUCOMTR-MCNC: 128 MG/DL (ref 70–99)
GLUCOSE BLDC GLUCOMTR-MCNC: 129 MG/DL (ref 70–99)
GLUCOSE BLDC GLUCOMTR-MCNC: 135 MG/DL (ref 70–99)
GLUCOSE BLDC GLUCOMTR-MCNC: 137 MG/DL (ref 70–99)
GLUCOSE BLDC GLUCOMTR-MCNC: 140 MG/DL (ref 70–99)
GLUCOSE BLDC GLUCOMTR-MCNC: 143 MG/DL (ref 70–99)
GLUCOSE BLDC GLUCOMTR-MCNC: 146 MG/DL (ref 70–99)
GLUCOSE BLDC GLUCOMTR-MCNC: 147 MG/DL (ref 70–99)
GLUCOSE BLDC GLUCOMTR-MCNC: 149 MG/DL (ref 70–99)
GLUCOSE BLDC GLUCOMTR-MCNC: 150 MG/DL (ref 70–99)
GLUCOSE BLDC GLUCOMTR-MCNC: 151 MG/DL (ref 70–99)
GLUCOSE BLDC GLUCOMTR-MCNC: 151 MG/DL (ref 70–99)
GLUCOSE BLDC GLUCOMTR-MCNC: 154 MG/DL (ref 70–99)
GLUCOSE BLDC GLUCOMTR-MCNC: 155 MG/DL (ref 70–99)
GLUCOSE BLDC GLUCOMTR-MCNC: 156 MG/DL (ref 70–99)
GLUCOSE BLDC GLUCOMTR-MCNC: 156 MG/DL (ref 70–99)
GLUCOSE BLDC GLUCOMTR-MCNC: 157 MG/DL (ref 70–99)
GLUCOSE BLDC GLUCOMTR-MCNC: 157 MG/DL (ref 70–99)
GLUCOSE BLDC GLUCOMTR-MCNC: 158 MG/DL (ref 70–99)
GLUCOSE BLDC GLUCOMTR-MCNC: 159 MG/DL (ref 70–99)
GLUCOSE BLDC GLUCOMTR-MCNC: 160 MG/DL (ref 70–99)
GLUCOSE BLDC GLUCOMTR-MCNC: 162 MG/DL (ref 70–99)
GLUCOSE BLDC GLUCOMTR-MCNC: 162 MG/DL (ref 70–99)
GLUCOSE BLDC GLUCOMTR-MCNC: 163 MG/DL (ref 70–99)
GLUCOSE BLDC GLUCOMTR-MCNC: 163 MG/DL (ref 70–99)
GLUCOSE BLDC GLUCOMTR-MCNC: 165 MG/DL (ref 70–99)
GLUCOSE BLDC GLUCOMTR-MCNC: 166 MG/DL (ref 70–99)
GLUCOSE BLDC GLUCOMTR-MCNC: 167 MG/DL (ref 70–99)
GLUCOSE BLDC GLUCOMTR-MCNC: 168 MG/DL (ref 70–99)
GLUCOSE BLDC GLUCOMTR-MCNC: 169 MG/DL (ref 70–99)
GLUCOSE BLDC GLUCOMTR-MCNC: 170 MG/DL (ref 70–99)
GLUCOSE BLDC GLUCOMTR-MCNC: 175 MG/DL (ref 70–99)
GLUCOSE BLDC GLUCOMTR-MCNC: 176 MG/DL (ref 70–99)
GLUCOSE BLDC GLUCOMTR-MCNC: 178 MG/DL (ref 70–99)
GLUCOSE BLDC GLUCOMTR-MCNC: 178 MG/DL (ref 70–99)
GLUCOSE BLDC GLUCOMTR-MCNC: 181 MG/DL (ref 70–99)
GLUCOSE BLDC GLUCOMTR-MCNC: 184 MG/DL (ref 70–99)
GLUCOSE BLDC GLUCOMTR-MCNC: 190 MG/DL (ref 70–99)
GLUCOSE BLDC GLUCOMTR-MCNC: 191 MG/DL (ref 70–99)
GLUCOSE BLDC GLUCOMTR-MCNC: 191 MG/DL (ref 70–99)
GLUCOSE BLDC GLUCOMTR-MCNC: 194 MG/DL (ref 70–99)
GLUCOSE BLDC GLUCOMTR-MCNC: 195 MG/DL (ref 70–99)
GLUCOSE BLDC GLUCOMTR-MCNC: 196 MG/DL (ref 70–99)
GLUCOSE BLDC GLUCOMTR-MCNC: 196 MG/DL (ref 70–99)
GLUCOSE BLDC GLUCOMTR-MCNC: 197 MG/DL (ref 70–99)
GLUCOSE BLDC GLUCOMTR-MCNC: 200 MG/DL (ref 70–99)
GLUCOSE BLDC GLUCOMTR-MCNC: 203 MG/DL (ref 70–99)
GLUCOSE BLDC GLUCOMTR-MCNC: 207 MG/DL (ref 70–99)
GLUCOSE BLDC GLUCOMTR-MCNC: 220 MG/DL (ref 70–99)
GLUCOSE SERPL-MCNC: 167 MG/DL (ref 70–99)
GLUCOSE SERPL-MCNC: 168 MG/DL (ref 70–99)
GLUCOSE SERPL-MCNC: 170 MG/DL (ref 70–99)
GLUCOSE SERPL-MCNC: 171 MG/DL (ref 70–99)
GLUCOSE SERPL-MCNC: 176 MG/DL (ref 70–99)
GLUCOSE SERPL-MCNC: 232 MG/DL (ref 70–99)
HCO3 BLD-SCNC: 33 MMOL/L (ref 21–28)
HCO3 BLD-SCNC: 34 MMOL/L (ref 21–28)
HCO3 BLD-SCNC: 35 MMOL/L (ref 21–28)
HCO3 BLD-SCNC: 35 MMOL/L (ref 21–28)
HCO3 BLD-SCNC: 37 MMOL/L (ref 21–28)
HCO3 BLD-SCNC: 37 MMOL/L (ref 21–28)
HCT VFR BLD AUTO: 27.4 % (ref 35–47)
HGB BLD-MCNC: 8.9 G/DL (ref 11.7–15.7)
IMM GRANULOCYTES # BLD: 0.7 10E9/L (ref 0–0.4)
IMM GRANULOCYTES NFR BLD: 3.2 %
INR PPP: 1.29 (ref 0.86–1.14)
INTERPRETATION ECG - MUSE: NORMAL
LACTATE BLD-SCNC: 4.3 MMOL/L (ref 0.7–2)
LACTATE BLD-SCNC: 4.4 MMOL/L (ref 0.7–2)
LACTATE BLD-SCNC: 4.6 MMOL/L (ref 0.7–2)
LACTATE BLD-SCNC: 4.8 MMOL/L (ref 0.7–2)
LIPASE SERPL-CCNC: 275 U/L (ref 73–393)
LYMPHOCYTES # BLD AUTO: 1.6 10E9/L (ref 0.8–5.3)
LYMPHOCYTES NFR BLD AUTO: 6.8 %
MAGNESIUM SERPL-MCNC: 1.9 MG/DL (ref 1.6–2.3)
MAGNESIUM SERPL-MCNC: 2.1 MG/DL (ref 1.6–2.3)
MAGNESIUM SERPL-MCNC: 2.2 MG/DL (ref 1.6–2.3)
MAGNESIUM SERPL-MCNC: 2.3 MG/DL (ref 1.6–2.3)
MAGNESIUM SERPL-MCNC: 2.4 MG/DL (ref 1.6–2.3)
MAGNESIUM SERPL-MCNC: 2.5 MG/DL (ref 1.6–2.3)
MCH RBC QN AUTO: 31 PG (ref 26.5–33)
MCHC RBC AUTO-ENTMCNC: 32.5 G/DL (ref 31.5–36.5)
MCV RBC AUTO: 96 FL (ref 78–100)
MONOCYTES # BLD AUTO: 0.9 10E9/L (ref 0–1.3)
MONOCYTES NFR BLD AUTO: 3.9 %
NEUTROPHILS # BLD AUTO: 19.8 10E9/L (ref 1.6–8.3)
NEUTROPHILS NFR BLD AUTO: 85.9 %
NRBC # BLD AUTO: 0.2 10*3/UL
NRBC BLD AUTO-RTO: 1 /100
O2/TOTAL GAS SETTING VFR VENT: 40 %
O2/TOTAL GAS SETTING VFR VENT: 40 %
O2/TOTAL GAS SETTING VFR VENT: 45 %
O2/TOTAL GAS SETTING VFR VENT: 50 %
PCO2 BLD: 44 MM HG (ref 35–45)
PCO2 BLD: 47 MM HG (ref 35–45)
PCO2 BLD: 49 MM HG (ref 35–45)
PCO2 BLD: 49 MM HG (ref 35–45)
PCO2 BLD: 51 MM HG (ref 35–45)
PCO2 BLD: 55 MM HG (ref 35–45)
PH BLD: 7.38 PH (ref 7.35–7.45)
PH BLD: 7.44 PH (ref 7.35–7.45)
PH BLD: 7.46 PH (ref 7.35–7.45)
PH BLD: 7.48 PH (ref 7.35–7.45)
PH BLD: 7.49 PH (ref 7.35–7.45)
PH BLD: 7.53 PH (ref 7.35–7.45)
PHOSPHATE SERPL-MCNC: 1.8 MG/DL (ref 2.5–4.5)
PHOSPHATE SERPL-MCNC: 2.2 MG/DL (ref 2.5–4.5)
PHOSPHATE SERPL-MCNC: 2.4 MG/DL (ref 2.5–4.5)
PHOSPHATE SERPL-MCNC: 2.4 MG/DL (ref 2.5–4.5)
PHOSPHATE SERPL-MCNC: 3 MG/DL (ref 2.5–4.5)
PHOSPHATE SERPL-MCNC: 6.4 MG/DL (ref 2.5–4.5)
PLATELET # BLD AUTO: 90 10E9/L (ref 150–450)
PLATELET # BLD EST: ABNORMAL 10*3/UL
PO2 BLD: 48 MM HG (ref 80–105)
PO2 BLD: 77 MM HG (ref 80–105)
PO2 BLD: 84 MM HG (ref 80–105)
PO2 BLD: 87 MM HG (ref 80–105)
PO2 BLD: 89 MM HG (ref 80–105)
PO2 BLD: 95 MM HG (ref 80–105)
POTASSIUM SERPL-SCNC: 3.4 MMOL/L (ref 3.4–5.3)
POTASSIUM SERPL-SCNC: 3.5 MMOL/L (ref 3.4–5.3)
POTASSIUM SERPL-SCNC: 3.5 MMOL/L (ref 3.4–5.3)
POTASSIUM SERPL-SCNC: 3.6 MMOL/L (ref 3.4–5.3)
POTASSIUM SERPL-SCNC: 3.7 MMOL/L (ref 3.4–5.3)
POTASSIUM SERPL-SCNC: 3.9 MMOL/L (ref 3.4–5.3)
PROCALCITONIN SERPL-MCNC: 3.39 NG/ML
PROT SERPL-MCNC: 3.7 G/DL (ref 6.8–8.8)
RBC # BLD AUTO: 2.87 10E12/L (ref 3.8–5.2)
SODIUM SERPL-SCNC: 149 MMOL/L (ref 133–144)
SODIUM SERPL-SCNC: 150 MMOL/L (ref 133–144)
SODIUM SERPL-SCNC: 152 MMOL/L (ref 133–144)
SODIUM SERPL-SCNC: 153 MMOL/L (ref 133–144)
TROPONIN I SERPL-MCNC: 0.07 UG/L (ref 0–0.04)
TROPONIN I SERPL-MCNC: 0.1 UG/L (ref 0–0.04)
WBC # BLD AUTO: 23.1 10E9/L (ref 4–11)

## 2017-12-16 PROCEDURE — 94640 AIRWAY INHALATION TREATMENT: CPT | Mod: 76

## 2017-12-16 PROCEDURE — 71010 XR CHEST PORT 1 VW: CPT

## 2017-12-16 PROCEDURE — 25000128 H RX IP 250 OP 636: Performed by: STUDENT IN AN ORGANIZED HEALTH CARE EDUCATION/TRAINING PROGRAM

## 2017-12-16 PROCEDURE — 94640 AIRWAY INHALATION TREATMENT: CPT

## 2017-12-16 PROCEDURE — 83735 ASSAY OF MAGNESIUM: CPT | Performed by: INTERNAL MEDICINE

## 2017-12-16 PROCEDURE — 20000004 ZZH R&B ICU UMMC

## 2017-12-16 PROCEDURE — 25000128 H RX IP 250 OP 636: Performed by: HOSPITALIST

## 2017-12-16 PROCEDURE — 93005 ELECTROCARDIOGRAM TRACING: CPT

## 2017-12-16 PROCEDURE — 25000132 ZZH RX MED GY IP 250 OP 250 PS 637: Performed by: STUDENT IN AN ORGANIZED HEALTH CARE EDUCATION/TRAINING PROGRAM

## 2017-12-16 PROCEDURE — 83690 ASSAY OF LIPASE: CPT | Performed by: INTERNAL MEDICINE

## 2017-12-16 PROCEDURE — 25800025 ZZH RX 258: Performed by: STUDENT IN AN ORGANIZED HEALTH CARE EDUCATION/TRAINING PROGRAM

## 2017-12-16 PROCEDURE — 25000125 ZZHC RX 250: Performed by: STUDENT IN AN ORGANIZED HEALTH CARE EDUCATION/TRAINING PROGRAM

## 2017-12-16 PROCEDURE — 82330 ASSAY OF CALCIUM: CPT | Performed by: INTERNAL MEDICINE

## 2017-12-16 PROCEDURE — 99291 CRITICAL CARE FIRST HOUR: CPT | Mod: GC | Performed by: INTERNAL MEDICINE

## 2017-12-16 PROCEDURE — 25000125 ZZHC RX 250: Performed by: INTERNAL MEDICINE

## 2017-12-16 PROCEDURE — 84484 ASSAY OF TROPONIN QUANT: CPT | Performed by: INTERNAL MEDICINE

## 2017-12-16 PROCEDURE — 93010 ELECTROCARDIOGRAM REPORT: CPT | Performed by: INTERNAL MEDICINE

## 2017-12-16 PROCEDURE — 40000556 ZZH STATISTIC PERIPHERAL IV START W US GUIDANCE

## 2017-12-16 PROCEDURE — 80048 BASIC METABOLIC PNL TOTAL CA: CPT | Performed by: INTERNAL MEDICINE

## 2017-12-16 PROCEDURE — 40000014 ZZH STATISTIC ARTERIAL MONITORING DAILY

## 2017-12-16 PROCEDURE — 40000281 ZZH STATISTIC TRANSPORT TIME EA 15 MIN

## 2017-12-16 PROCEDURE — 40000275 ZZH STATISTIC RCP TIME EA 10 MIN

## 2017-12-16 PROCEDURE — 25000128 H RX IP 250 OP 636: Performed by: INTERNAL MEDICINE

## 2017-12-16 PROCEDURE — 82803 BLOOD GASES ANY COMBINATION: CPT | Performed by: INTERNAL MEDICINE

## 2017-12-16 PROCEDURE — 00000146 ZZHCL STATISTIC GLUCOSE BY METER IP

## 2017-12-16 PROCEDURE — 85610 PROTHROMBIN TIME: CPT | Performed by: INTERNAL MEDICINE

## 2017-12-16 PROCEDURE — 25000132 ZZH RX MED GY IP 250 OP 250 PS 637: Performed by: HOSPITALIST

## 2017-12-16 PROCEDURE — 94003 VENT MGMT INPAT SUBQ DAY: CPT

## 2017-12-16 PROCEDURE — 74177 CT ABD & PELVIS W/CONTRAST: CPT

## 2017-12-16 PROCEDURE — 84100 ASSAY OF PHOSPHORUS: CPT | Performed by: INTERNAL MEDICINE

## 2017-12-16 PROCEDURE — 82330 ASSAY OF CALCIUM: CPT | Performed by: STUDENT IN AN ORGANIZED HEALTH CARE EDUCATION/TRAINING PROGRAM

## 2017-12-16 PROCEDURE — 84145 PROCALCITONIN (PCT): CPT | Performed by: INTERNAL MEDICINE

## 2017-12-16 PROCEDURE — 85025 COMPLETE CBC W/AUTO DIFF WBC: CPT | Performed by: INTERNAL MEDICINE

## 2017-12-16 PROCEDURE — 85384 FIBRINOGEN ACTIVITY: CPT | Performed by: INTERNAL MEDICINE

## 2017-12-16 PROCEDURE — 25000132 ZZH RX MED GY IP 250 OP 250 PS 637: Performed by: INTERNAL MEDICINE

## 2017-12-16 PROCEDURE — 83605 ASSAY OF LACTIC ACID: CPT | Performed by: INTERNAL MEDICINE

## 2017-12-16 PROCEDURE — 25000131 ZZH RX MED GY IP 250 OP 636 PS 637: Performed by: INTERNAL MEDICINE

## 2017-12-16 PROCEDURE — 80053 COMPREHEN METABOLIC PANEL: CPT | Performed by: INTERNAL MEDICINE

## 2017-12-16 PROCEDURE — 27210995 ZZH RX 272: Performed by: STUDENT IN AN ORGANIZED HEALTH CARE EDUCATION/TRAINING PROGRAM

## 2017-12-16 RX ORDER — THIAMINE HYDROCHLORIDE 100 MG/ML
100 INJECTION, SOLUTION INTRAMUSCULAR; INTRAVENOUS EVERY 12 HOURS
Status: DISCONTINUED | OUTPATIENT
Start: 2017-12-16 | End: 2017-12-25 | Stop reason: ALTCHOICE

## 2017-12-16 RX ORDER — HEPARIN SODIUM 10000 [USP'U]/100ML
0-3500 INJECTION, SOLUTION INTRAVENOUS CONTINUOUS
Status: DISCONTINUED | OUTPATIENT
Start: 2017-12-16 | End: 2017-12-16

## 2017-12-16 RX ORDER — IOPAMIDOL 755 MG/ML
91 INJECTION, SOLUTION INTRAVASCULAR ONCE
Status: COMPLETED | OUTPATIENT
Start: 2017-12-16 | End: 2017-12-16

## 2017-12-16 RX ORDER — MIDAZOLAM (PF) 1 MG/ML IN 0.9 % SODIUM CHLORIDE INTRAVENOUS SOLUTION
1-12 CONTINUOUS
Status: DISCONTINUED | OUTPATIENT
Start: 2017-12-16 | End: 2017-12-18

## 2017-12-16 RX ORDER — LORAZEPAM 2 MG/ML
4-6 INJECTION INTRAMUSCULAR
Status: DISCONTINUED | OUTPATIENT
Start: 2017-12-16 | End: 2017-12-16

## 2017-12-16 RX ORDER — FUROSEMIDE 10 MG/ML
40 INJECTION INTRAMUSCULAR; INTRAVENOUS ONCE
Status: COMPLETED | OUTPATIENT
Start: 2017-12-16 | End: 2017-12-16

## 2017-12-16 RX ORDER — LEVOTHYROXINE SODIUM ANHYDROUS 100 UG/5ML
25 INJECTION, POWDER, LYOPHILIZED, FOR SOLUTION INTRAVENOUS DAILY
Status: DISCONTINUED | OUTPATIENT
Start: 2017-12-16 | End: 2017-12-22

## 2017-12-16 RX ORDER — ASPIRIN 325 MG
325 TABLET ORAL ONCE
Status: COMPLETED | OUTPATIENT
Start: 2017-12-16 | End: 2017-12-16

## 2017-12-16 RX ORDER — LEVOTHYROXINE SODIUM ANHYDROUS 100 UG/5ML
37.5 INJECTION, POWDER, LYOPHILIZED, FOR SOLUTION INTRAVENOUS DAILY
Status: DISCONTINUED | OUTPATIENT
Start: 2017-12-16 | End: 2017-12-16

## 2017-12-16 RX ORDER — FENTANYL CITRATE 50 UG/ML
25-50 INJECTION, SOLUTION INTRAMUSCULAR; INTRAVENOUS
Status: DISCONTINUED | OUTPATIENT
Start: 2017-12-16 | End: 2017-12-24

## 2017-12-16 RX ORDER — PROPOFOL 10 MG/ML
5-75 INJECTION, EMULSION INTRAVENOUS CONTINUOUS
Status: DISCONTINUED | OUTPATIENT
Start: 2017-12-16 | End: 2017-12-23

## 2017-12-16 RX ORDER — BISACODYL 10 MG
10 SUPPOSITORY, RECTAL RECTAL DAILY PRN
Status: DISCONTINUED | OUTPATIENT
Start: 2017-12-16 | End: 2018-01-05 | Stop reason: HOSPADM

## 2017-12-16 RX ADMIN — SODIUM PHOSPHATE, DIBASIC AND SODIUM PHOSPHATE, MONOBASIC 1 ENEMA: 7; 19 ENEMA RECTAL at 17:24

## 2017-12-16 RX ADMIN — LORAZEPAM 4 MG: 2 INJECTION INTRAMUSCULAR; INTRAVENOUS at 09:22

## 2017-12-16 RX ADMIN — SODIUM CHLORIDE 1 UNITS/KG/HR: 0.9 INJECTION, SOLUTION INTRAVENOUS at 04:57

## 2017-12-16 RX ADMIN — LEVOTHYROXINE SODIUM ANHYDROUS 25 MCG: 100 INJECTION, POWDER, LYOPHILIZED, FOR SOLUTION INTRAVENOUS at 15:45

## 2017-12-16 RX ADMIN — POTASSIUM CHLORIDE 20 MEQ: 400 INJECTION, SOLUTION INTRAVENOUS at 16:28

## 2017-12-16 RX ADMIN — MIDAZOLAM 4 MG: 1 INJECTION INTRAMUSCULAR; INTRAVENOUS at 14:20

## 2017-12-16 RX ADMIN — FUROSEMIDE 40 MG: 10 INJECTION, SOLUTION INTRAVENOUS at 18:29

## 2017-12-16 RX ADMIN — Medication 9 MG/HR: at 18:45

## 2017-12-16 RX ADMIN — HYDROCORTISONE SODIUM SUCCINATE 50 MG: 100 INJECTION, POWDER, FOR SOLUTION INTRAMUSCULAR; INTRAVENOUS at 12:06

## 2017-12-16 RX ADMIN — MIDAZOLAM 8 MG: 1 INJECTION INTRAMUSCULAR; INTRAVENOUS at 19:19

## 2017-12-16 RX ADMIN — DEXTROSE MONOHYDRATE: 70 INJECTION, SOLUTION INTRAVENOUS at 21:12

## 2017-12-16 RX ADMIN — MAGNESIUM SULFATE IN DEXTROSE 1 G: 10 INJECTION, SOLUTION INTRAVENOUS at 21:07

## 2017-12-16 RX ADMIN — POTASSIUM PHOSPHATE, MONOBASIC AND POTASSIUM PHOSPHATE, DIBASIC 15 MMOL: 224; 236 INJECTION, SOLUTION INTRAVENOUS at 08:35

## 2017-12-16 RX ADMIN — THIAMINE HYDROCHLORIDE 100 MG: 100 INJECTION, SOLUTION INTRAMUSCULAR; INTRAVENOUS at 19:56

## 2017-12-16 RX ADMIN — FOLIC ACID 1 MG: 5 INJECTION, SOLUTION INTRAMUSCULAR; INTRAVENOUS; SUBCUTANEOUS at 08:37

## 2017-12-16 RX ADMIN — PROPOFOL 20 MCG/KG/MIN: 10 INJECTION, EMULSION INTRAVENOUS at 22:36

## 2017-12-16 RX ADMIN — Medication 75 MCG/HR: at 13:13

## 2017-12-16 RX ADMIN — FUROSEMIDE 40 MG: 10 INJECTION, SOLUTION INTRAVENOUS at 09:57

## 2017-12-16 RX ADMIN — Medication 8 MG/HR: at 11:08

## 2017-12-16 RX ADMIN — LORAZEPAM 8 MG/HR: 2 INJECTION INTRAMUSCULAR; INTRAVENOUS at 09:11

## 2017-12-16 RX ADMIN — ASCORBIC ACID 1500 MG: 500 INJECTION, SOLUTION INTRAMUSCULAR; INTRAVENOUS; SUBCUTANEOUS at 05:50

## 2017-12-16 RX ADMIN — POTASSIUM CHLORIDE 20 MEQ: 400 INJECTION, SOLUTION INTRAVENOUS at 13:17

## 2017-12-16 RX ADMIN — ALBUTEROL SULFATE 2.5 MG: 2.5 SOLUTION RESPIRATORY (INHALATION) at 09:05

## 2017-12-16 RX ADMIN — ALBUTEROL SULFATE 2.5 MG: 2.5 SOLUTION RESPIRATORY (INHALATION) at 00:04

## 2017-12-16 RX ADMIN — THIAMINE HYDROCHLORIDE 200 MG: 100 INJECTION, SOLUTION INTRAMUSCULAR; INTRAVENOUS at 08:38

## 2017-12-16 RX ADMIN — SODIUM CHLORIDE 10 UNITS/KG/HR: 0.9 INJECTION, SOLUTION INTRAVENOUS at 12:05

## 2017-12-16 RX ADMIN — NOREPINEPHRINE BITARTRATE 0.2 MCG/KG/MIN: 1 INJECTION INTRAVENOUS at 14:41

## 2017-12-16 RX ADMIN — Medication 10 MG: at 17:43

## 2017-12-16 RX ADMIN — ALBUTEROL SULFATE 2.5 MG: 2.5 SOLUTION RESPIRATORY (INHALATION) at 12:15

## 2017-12-16 RX ADMIN — POTASSIUM PHOSPHATE, MONOBASIC AND POTASSIUM PHOSPHATE, DIBASIC 15 MMOL: 224; 236 INJECTION, SOLUTION INTRAVENOUS at 17:12

## 2017-12-16 RX ADMIN — POTASSIUM CHLORIDE 20 MEQ: 1.5 POWDER, FOR SOLUTION ORAL at 21:07

## 2017-12-16 RX ADMIN — NOREPINEPHRINE BITARTRATE 0.3 MCG/KG/MIN: 1 INJECTION INTRAVENOUS at 14:12

## 2017-12-16 RX ADMIN — IOPAMIDOL 91 ML: 755 INJECTION, SOLUTION INTRAVENOUS at 11:38

## 2017-12-16 RX ADMIN — FENTANYL CITRATE 50 MCG: 50 INJECTION, SOLUTION INTRAMUSCULAR; INTRAVENOUS at 09:28

## 2017-12-16 RX ADMIN — LORAZEPAM 2 MG: 2 INJECTION INTRAMUSCULAR; INTRAVENOUS at 09:33

## 2017-12-16 RX ADMIN — CALCIUM CHLORIDE 0.5 G/HR: 100 INJECTION, SOLUTION INTRAVENOUS at 19:49

## 2017-12-16 RX ADMIN — ENOXAPARIN SODIUM 40 MG: 40 INJECTION SUBCUTANEOUS at 12:05

## 2017-12-16 RX ADMIN — MIDAZOLAM 4 MG: 1 INJECTION INTRAMUSCULAR; INTRAVENOUS at 16:06

## 2017-12-16 RX ADMIN — ALBUTEROL SULFATE 2.5 MG: 2.5 SOLUTION RESPIRATORY (INHALATION) at 20:55

## 2017-12-16 RX ADMIN — POTASSIUM CHLORIDE 20 MEQ: 400 INJECTION, SOLUTION INTRAVENOUS at 05:59

## 2017-12-16 RX ADMIN — SODIUM CHLORIDE 10 UNITS/KG/HR: 0.9 INJECTION, SOLUTION INTRAVENOUS at 20:12

## 2017-12-16 RX ADMIN — DEXTROSE MONOHYDRATE: 70 INJECTION, SOLUTION INTRAVENOUS at 16:03

## 2017-12-16 RX ADMIN — HYDROCORTISONE SODIUM SUCCINATE 50 MG: 100 INJECTION, POWDER, FOR SOLUTION INTRAMUSCULAR; INTRAVENOUS at 05:50

## 2017-12-16 RX ADMIN — FENTANYL CITRATE 50 MCG: 50 INJECTION, SOLUTION INTRAMUSCULAR; INTRAVENOUS at 19:19

## 2017-12-16 RX ADMIN — PROPOFOL 20 MCG/KG/MIN: 10 INJECTION, EMULSION INTRAVENOUS at 09:52

## 2017-12-16 RX ADMIN — ASCORBIC ACID 1500 MG: 500 INJECTION, SOLUTION INTRAMUSCULAR; INTRAVENOUS; SUBCUTANEOUS at 00:51

## 2017-12-16 RX ADMIN — PANTOPRAZOLE SODIUM 40 MG: 40 INJECTION, POWDER, FOR SOLUTION INTRAVENOUS at 08:38

## 2017-12-16 RX ADMIN — HYDROCORTISONE SODIUM SUCCINATE 100 MG: 100 INJECTION, POWDER, FOR SOLUTION INTRAMUSCULAR; INTRAVENOUS at 22:35

## 2017-12-16 RX ADMIN — CEFEPIME 2 G: 1 INJECTION, SOLUTION INTRAVENOUS at 00:13

## 2017-12-16 RX ADMIN — MIDAZOLAM 4 MG: 1 INJECTION INTRAMUSCULAR; INTRAVENOUS at 12:54

## 2017-12-16 RX ADMIN — SODIUM CHLORIDE, PRESERVATIVE FREE 73 ML: 5 INJECTION INTRAVENOUS at 11:38

## 2017-12-16 RX ADMIN — MIDAZOLAM 4 MG: 1 INJECTION INTRAMUSCULAR; INTRAVENOUS at 17:28

## 2017-12-16 RX ADMIN — DEXTROSE MONOHYDRATE: 70 INJECTION, SOLUTION INTRAVENOUS at 04:28

## 2017-12-16 RX ADMIN — HEPARIN SODIUM 750 UNITS/HR: 10000 INJECTION, SOLUTION INTRAVENOUS at 03:15

## 2017-12-16 RX ADMIN — ALBUTEROL SULFATE 2.5 MG: 2.5 SOLUTION RESPIRATORY (INHALATION) at 16:13

## 2017-12-16 RX ADMIN — DEXTROSE MONOHYDRATE: 70 INJECTION, SOLUTION INTRAVENOUS at 09:46

## 2017-12-16 RX ADMIN — HYDROCORTISONE SODIUM SUCCINATE 50 MG: 100 INJECTION, POWDER, FOR SOLUTION INTRAMUSCULAR; INTRAVENOUS at 00:15

## 2017-12-16 RX ADMIN — MAGNESIUM SULFATE IN DEXTROSE 1 G: 10 INJECTION, SOLUTION INTRAVENOUS at 11:03

## 2017-12-16 RX ADMIN — ASPIRIN 325 MG ORAL TABLET 325 MG: 325 PILL ORAL at 04:00

## 2017-12-16 RX ADMIN — HYDROCORTISONE SODIUM SUCCINATE 100 MG: 100 INJECTION, POWDER, FOR SOLUTION INTRAMUSCULAR; INTRAVENOUS at 15:21

## 2017-12-16 RX ADMIN — Medication 2 G: at 03:00

## 2017-12-16 RX ADMIN — LORAZEPAM 2 MG: 2 INJECTION INTRAMUSCULAR; INTRAVENOUS at 08:48

## 2017-12-16 RX ADMIN — POTASSIUM CHLORIDE 20 MEQ: 400 INJECTION, SOLUTION INTRAVENOUS at 03:01

## 2017-12-16 RX ADMIN — Medication 40 MG: at 19:22

## 2017-12-16 RX ADMIN — POTASSIUM CHLORIDE 20 MEQ: 400 INJECTION, SOLUTION INTRAVENOUS at 00:12

## 2017-12-16 RX ADMIN — ALBUTEROL SULFATE 2.5 MG: 2.5 SOLUTION RESPIRATORY (INHALATION) at 04:39

## 2017-12-16 RX ADMIN — FUROSEMIDE 10 MG/HR: 10 INJECTION, SOLUTION INTRAVENOUS at 19:49

## 2017-12-16 RX ADMIN — POTASSIUM CHLORIDE 20 MEQ: 400 INJECTION, SOLUTION INTRAVENOUS at 09:57

## 2017-12-16 NOTE — PLAN OF CARE
Problem: Patient Care Overview  Goal: Plan of Care/Patient Progress Review  Outcome: Improving  Potassium low today and replaced per protocol. NSR 85 this am and slowly rising HR throughout this shift. BP labile, BP drops with turns, BP rises >10mmhg with leg raise. Appears intravascular dry. Urine output brisk throughout shift; Lasix off at 1530. Insulin off at 1530 given low potassium. Goal is to replace K until it is 4 and then resume insulin drip. Continues to not have bowel sounds present and unable to give potassium down gastric tube. Glucoses maintained 150-200 with D50 drip.

## 2017-12-16 NOTE — PROGRESS NOTES
[facility]      MICU Progress Note  Ana Laura Wharton MRN: [id number]  1970  Date of Admission:12/13/2017  Primary care provider: Ander Fuchs      Assessment and Plan:     Ana Laura Wharton is a 48 yo female with hx of bipolar disorder with depression, polysubstance abuse, & prior suicide attempts, admitted to [facility] MICU  Following calcium channel blocker (CCB) overdose, tylenol overdose & alcohol overdose. Course complicated by lactic acidosis, hypoventilation, acute renal failure, & severe hypotension/vasogenic shock. Currently intubated & sedated, on pressors.     NEURO  Sedation/Analgesia:  Midazolam gtt + additional PRN  Fentanyl gtt + additional PRN  Propofol as needed if pressures tolerate    Paralytic: cisatracurium gtt d/c 12/16    Bipolar I disorder with anxiety & depression  Suicide attempt with polysubstance abuse (amlodipine, nyquil (acetaminophen), alcohol (vodka))  - Manage OD as per below  - Poison control recs: ical @10, may take days-week to clear  - Will monitor for withdrawal symptoms once weaned from sedation (ETOH 0.17 at OSH). IV thiamine & folate  - Holding home zyprexa & remeron. If agitated, will increase versed  - Psych consult for OD    RESPIRATORY  Vent Settings:  Ventilation Mode: CMV/AC  FiO2 (%): 50 %  Rate Set (breaths/minute): 27 breaths/min  Tidal Volume Set (mL): 550 mL  PEEP (cm H2O): 10 cmH2O  Oxygen Concentration (%): 50 %  Resp: 27    Hypoxic Respiratory Failure: Due initially to hypoventilation due to overdose, now due to V/Q mismatch in the setting of Pulm Edema  - Vent settings as per above  - Titrate O2 to sats >90%, q4 ABG    CARDIOVASCULAR  Hypotension/Vasodilatory shock/Vasoplegia: Requiring pressors. Side effect of CCB overdose. ECHO shows normal EF of 70%. Normal RV function.  - NE gtt, titrate to MAP >60  - VAP gtt if needed  - D50/Insulin gtt as per below  - Bicarbonate gtt discontinued as pH >7.25  - Calcium chloride gtt, iCal goal of 10 was reached,  gtt d/c 12/15  - Vitamin C (given methemoglobin of 2.6%). D/c 12/16  - Continue stress dose steroids  - Cardiology consulted, no indication for [place] currently    Bradycardia: Initially presented with bradycardia. Insulin gtt & dextrose  - Continue D20/Insulin gtt, will wean once off of pressors  - Management of hypotension as per above    RENAL / FLUIDS / ELECTROLYTES  Baseline creatinine: 0.7-0.8. S/p 7L IVF    Acute Renal Failure: Due to CCB overdose. Volume overload, pulmonary edema. Cr slowly improving with diuresis & forward flow  - Lasix gtt discontinued as fluid even for this admission. Intermittent IV lasix as needed. Goal of fluid even  - I/Os, Monitor Cr, UOP  - Monitory lytes, replace    Lactic Acidosis/AGMA: 4-8. Due to hypoperfusion. Lactate may continue to rise until after CCB has cleared  - Continue to trend  - CT abd/pelvis today  - Goal of pH>7.25, bicarb gtt as necessary    GASTROINTESTINAL  MELD-Na score: 12 at 12/16/2017 12:32 PM  MELD score: 12 at 12/16/2017 12:32 PM  Calculated from:  Serum Creatinine: 1.17 mg/dL at 12/16/2017 12:32 PM  Serum Sodium: 152 mmol/L (Rounded to 137) at 12/16/2017 12:32 PM  Total Bilirubin: 1.2 mg/dL at 12/16/2017  5:05 AM  INR(ratio): 1.29 at 12/16/2017  5:05 AM  Age: 47 years    Acetaminophen overdose  Elevated Transaminases  Found next to bottle of Nyquil. AST/ALTs now normal s/p NAC gtt.   - Trend LFTs, INR    OG: OG to LIS    Hx of C.diff: Hx of fecal transplant. Has had formed stool. Enteric precautions    Tube Feeds: Re-evaluate daily    INFECTIOUS DISEASE     Sepsis ppx  - Treated with Vanc/Cefepime for 48 hours    Cultures:  UA 12/13 negative  Blood Cx 12/13 NGTD    Antibiotics:  Vancomycin (12/14->12/16)  Cefepime (12/14->12/16)    HEMATOLOGY  Leukocytosis: Likely stress due to overdose. On ppx abx. Now on steroids. CRP & procal wnl  - Monitor daily     Anemia: Normocytic Anemia. In part dilutional from s/p 6L IVF  - Monitor daily    Thrombocytopenia:  Unknown etiology. Does not appear to be HIT related.  - Monitor daily    ENDOCRINE  On insulin for CBB overdose, monitoring sugars    Hypothyroidism: Levothyroxine IV, switch back to oral once able    SKIN CARE  No acute concerns    Prophylaxis: DVT: Lovenox: GI: PPI  Code status: Full  Family:  updated at bedside  Lines: RIJ, RAL, PIV x3    Patient was seen and discussed with attending physician Dr. Woods, MICU attending    Osei Calabrese MD  Internal Medicine/Pediatrics, PGY-3    Interval History:     Patient with concern for ST depressions on telemetry overnight. T wave inversions seen on EKG in the setting of calcium channel blockade overdose. Patient with small trop bump to 0.10, downtrended. Heparin gtt started & discontinued. Patient does not appear to have ACS. Patient briefly off pressors this AM. Paralytic stopped & patient more agitated. Ativan gtt switched to Versed. Afebrile. Adequate UOP.    4 Point Review of systems including CV, Respiratory, GI and  were negative unless otherwise noted.    PHYSICAL EXAM  Temp:  [98.4  F (36.9  C)-99.8  F (37.7  C)] 98.4  F (36.9  C)  Heart Rate:  [] 94  Resp:  [27] 27  BP: (117)/(61) 117/61  MAP:  [51 mmHg-92 mmHg] 60 mmHg  Arterial Line BP: ()/(34-73) 104/42  FiO2 (%):  [45 %-55 %] 50 %  SpO2:  [92 %-100 %] 98 %  Ventilation Mode: CMV/AC  FiO2 (%): 50 %  Rate Set (breaths/minute): 27 breaths/min  Tidal Volume Set (mL): 550 mL  PEEP (cm H2O): 10 cmH2O  Oxygen Concentration (%): 50 %  Resp: 27  Vitals:    12/14/17 0000 12/15/17 0500 12/16/17 0400   Weight: 62.3 kg (137 lb 5.6 oz) 68.3 kg (150 lb 9.2 oz) 65.3 kg (143 lb 15.4 oz)       Intake/Output Summary (Last 24 hours) at 12/16/17 1257  Last data filed at 12/16/17 1200   Gross per 24 hour   Intake          5488.33 ml   Output             5285 ml   Net           203.33 ml     GEN: Sedated & intubated  EYES: Closed  CV: RRR, no murmurs, peripheral pulses  PULM: Coarse breath sounds bilaterally, no  wheezing  GI: bowel sounds absent, soft but firm abdomen  : Napoles catheter in place  EXTREMITIES: Reticular pattern on bilateral LE, 1+ edema of upper extremities  NEURO: sedated, non-responsive  SKIN: No lesions    DIAGNOSTIC STUDIES  ROUTINE ICU LABS (Last four results)  CMP  Recent Labs  Lab 12/16/17  1232 12/16/17  0850 12/16/17  0505 12/16/17  0205  12/15/17  0421  12/14/17  1600  12/14/17  0429   * 149* 150* 153*  < > 152*  < > 148*  < > 144   POTASSIUM 3.4 3.7 3.6 3.9  < > 3.2*  < > 3.4  < > 3.4   CHLORIDE 111* 110* 110* 112*  < > 116*  < > 114*  < > 116*   CO2 33* 34* 36* 36*  < > 27  < > 17*  < > 12*   ANIONGAP 8 6 4 5  < > 9  < > 17*  < > 16*   * 232* 167* 168*  < > 192*  < > 142*  < > 162*   BUN 23 21 20 19  < > 17  < > 18  < > 17   CR 1.17* 1.13* 1.18* 1.19*  < > 1.51*  < > 1.43*  < > 1.20*   GFRESTIMATED 50* 52* 49* 49*  < > 37*  < > 39*  < > 48*   GFRESTBLACK 60* 62 59* 59*  < > 45*  < > 48*  < > 58*   ISAURO 12.0* 13.0* 13.7* 14.3*  < > 17.1*  < > 11.1*  < > 11.0*   MAG 2.4* 2.3 2.5* 1.9  < > 1.6  < >  --   < > 1.3*   PHOS 3.0 1.8* 2.2* 2.4*  < > 4.6*  < > 5.3*  < > 1.8*   PROTTOTAL  --   --  3.7*  --   --  3.9*  --  4.4*  --  5.0*   ALBUMIN  --   --  1.4*  --   --  1.6*  --  2.0*  --  2.6*   BILITOTAL  --   --  1.2  --   --  0.8  --  0.4  --  0.6   ALKPHOS  --   --  99  --   --  114  --  131  --  145   AST  --   --  77*  --   --  73*  --  67*  --  96*   ALT  --   --  30  --   --  43  --  64*  --  82*   < > = values in this interval not displayed.  CBC    Recent Labs  Lab 12/16/17  0505 12/15/17  0421 12/14/17  2150 12/14/17  1200   WBC 23.1* 35.3* 30.3* 36.5*   RBC 2.87* 3.74* 3.39* 3.26*   HGB 8.9* 11.7 10.3* 9.9*   HCT 27.4* 36.7 32.8* 30.6*   MCV 96 98 97 94   MCH 31.0 31.3 30.4 30.4   MCHC 32.5 31.9 31.4* 32.4   RDW 17.2* 16.7* 17.1* 16.9*   PLT 90* 119* 152 212     INR    Recent Labs  Lab 12/16/17  0505 12/15/17  1614 12/15/17  1154 12/15/17  0801   INR 1.29* 1.42* 1.55* 1.52*      Arterial Blood Gas    Recent Labs  Lab 12/16/17  1232 12/16/17  0847 12/16/17  0505 12/16/17  0030   PH 7.44 7.48* 7.49* 7.53*   PCO2 51* 47* 49* 44   PO2 89 95 84 87   HCO3 34* 35* 37* 37*   O2PER 50 45.0 45 45       Hepatic Studies:   Recent Labs   Lab Test  12/16/17   0505  12/15/17   0421  12/14/17   1600   AST  77*  73*  67*   ALT  30  43  64*   ALKPHOS  99  114  131   BILITOTAL  1.2  0.8  0.4   ALBUMIN  1.4*  1.6*  2.0*     Heme Studies:   Recent Labs   Lab Test  12/16/17   0505  12/15/17   1614  12/15/17   1154   12/15/17   0421   12/14/17   2150   WBC  23.1*   --    --    --   35.3*   --   30.3*   ANEU  19.8*   --    --    --   32.1*   --    --    ALYM  1.6   --    --    --   2.4   --    --    AEOS  0.0   --    --    --   0.0   --    --    HGB  8.9*   --    --    --   11.7   --   10.3*   MCV  96   --    --    --   98   --   97   PLT  90*   --    --    --   119*   --   152   INR  1.29*  1.42*  1.55*   < >  1.42*   < >   --     < > = values in this interval not displayed.     Iron Studies:   Recent Labs   Lab Test  03/12/17   0657   IRON  38   FEB  221*   IRONSAT  17     Urine Studies:   Recent Labs   Lab Test  12/15/17   0629  12/15/17   0511   SG  1.005  Canceled, Test credited   URINEPH  5.0  Canceled, Test credited   NITRITE  Negative  Canceled, Test credited*   LEUKEST  Negative  Canceled, Test credited*   WBCU  <1  Canceled, Test credited*   RBCU  <1  Canceled, Test credited   PROTEIN  Negative  Canceled, Test credited*       Microbiology:  No results found for: RS, FLUAG    Recent Labs   Lab Test  12/15/17   0428  12/15/17   0145  12/13/17   1750  10/02/17   0103  03/09/17   1740  01/24/17   0622  10/11/16   0615  04/10/14   1430   CULT  No growth after 1 day  Canceled, Test credited  Test canceled by PCU/Clinic  PER RN GAYLE S     --   50,000 to 100,000 colonies/mL  Enterococcus faecalis  *  <10,000 colonies/mL  urogenital mina  Susceptibility testing not routinely done     --   50,000 to 100,000  colonies/mL Proteus mirabilis*  Canceled, Test credited Duplicate request   --   No Salmonella, Shigella, Campylobacter, E. coli O157, Aeromonas, or Plesiomonas   isolated.     SDES  Blood White port  Blood  Nares  Midstream Urine  Feces  Midstream Urine  Unspecified Urine  Feces  Feces       Imaging:  Recent Results (from the past 24 hour(s))   XR Chest Port 1 View    Narrative    Exam:  Chest X-ray 12/16/2017 6:25 AM    History: Intubated, pulmonary edema;     Comparison: Chest x-ray dated 12/15/2017    Findings: AP portable chest x-ray. The endotracheal tube tip in the  mid intrathoracic trachea. Right IJ central venous catheter with the  tip projecting over the low SVC. Partially visualized enteric tube  with the sidehole projecting over the stomach. The cardiac silhouette  is stable. Redemonstration of diffuse patchy airspace opacities.  Surgical changes of left upper lobe wedge resection. No pneumothorax.  The upper abdomen is unremarkable.      Impression    Impression: No significant change in diffuse airspace opacities,  atypical infection versus pulmonary edema.    I have personally reviewed the examination and initial interpretation  and I agree with the findings.    DAVID VANCE MD

## 2017-12-16 NOTE — PLAN OF CARE
Problem: Restraint for Non-Violent/Non-Self-Destructive Behavior  Goal: Prevent/Manage Potential Problems  Maintain safety of patient and others during period of restraint.  Promote psychological and physical wellbeing.  Prevent injury to skin and involved body parts.  Promote nutrition, hydration, and elimination.  Outcome: Declining  Cisatracurium gtt d/c this AM. Within 1 hr, pt waking up w/increasing agitation & restlessness.  Unredirectable to nursing commands & no change in agitation w/PRN sedation given.  Bilateral soft-wrist restraints placed d/t concern for pt safety and potential for ETT/line removal.  MD aware, order received.  Will continue to monitor necessity Q 2 hrs and notify MD as needed.

## 2017-12-16 NOTE — PLAN OF CARE
Problem: Patient Care Overview  Goal: Plan of Care/Patient Progress Review  Outcome: No Change  D: Calcium channel blocker overdose  I/A: Pt currently heavily sedated/paralyzed (Ativan, Fentanyl, Cis); RASS -5, 2/4 twitches on TOF. HR 90-100s, occass PVCs, MAP >60 (Levo, Insulin). Vent: 45-55% FiO2/10 Peep/550 TV/27 RR. Min secretions. Good UOP, no BM- hypoactive/absent bowel sounds. OG at low int suction. Lytes replaced per protocol.   P: Cont POC, increase insulin gtt as able, titrate pressors as needed, monitor q4 hr labs.

## 2017-12-17 ENCOUNTER — APPOINTMENT (OUTPATIENT)
Dept: GENERAL RADIOLOGY | Facility: CLINIC | Age: 47
DRG: 917 | End: 2017-12-17
Attending: SURGERY
Payer: COMMERCIAL

## 2017-12-17 ENCOUNTER — APPOINTMENT (OUTPATIENT)
Dept: GENERAL RADIOLOGY | Facility: CLINIC | Age: 47
DRG: 917 | End: 2017-12-17
Attending: INTERNAL MEDICINE
Payer: COMMERCIAL

## 2017-12-17 PROBLEM — F10.929 ALCOHOLIC INTOXICATION WITH COMPLICATION (H): Status: ACTIVE | Noted: 2017-12-17

## 2017-12-17 PROBLEM — T46.1X1A CALCIUM CHANNEL BLOCKER OVERDOSE: Status: ACTIVE | Noted: 2017-12-17

## 2017-12-17 PROBLEM — J81.0 ACUTE PULMONARY EDEMA (H): Status: ACTIVE | Noted: 2017-12-17

## 2017-12-17 PROBLEM — N17.9 ACUTE RENAL FAILURE, UNSPECIFIED ACUTE RENAL FAILURE TYPE (H): Status: ACTIVE | Noted: 2017-12-17

## 2017-12-17 PROBLEM — I95.2 HYPOTENSION DUE TO MEDICATION: Status: ACTIVE | Noted: 2017-12-17

## 2017-12-17 PROBLEM — I48.0 PAROXYSMAL ATRIAL FIBRILLATION (H): Status: ACTIVE | Noted: 2017-12-17

## 2017-12-17 PROBLEM — J96.01 ACUTE RESPIRATORY FAILURE WITH HYPOXIA (H): Status: ACTIVE | Noted: 2017-12-17

## 2017-12-17 PROBLEM — T39.1X2A INTENTIONAL ACETAMINOPHEN OVERDOSE (H): Status: ACTIVE | Noted: 2017-12-17

## 2017-12-17 LAB
ALBUMIN SERPL-MCNC: 1.6 G/DL (ref 3.4–5)
ALP SERPL-CCNC: 120 U/L (ref 40–150)
ALT SERPL W P-5'-P-CCNC: 40 U/L (ref 0–50)
ANION GAP SERPL CALCULATED.3IONS-SCNC: 10 MMOL/L (ref 3–14)
ANION GAP SERPL CALCULATED.3IONS-SCNC: 5 MMOL/L (ref 3–14)
ANION GAP SERPL CALCULATED.3IONS-SCNC: 6 MMOL/L (ref 3–14)
ANION GAP SERPL CALCULATED.3IONS-SCNC: 7 MMOL/L (ref 3–14)
ANION GAP SERPL CALCULATED.3IONS-SCNC: 7 MMOL/L (ref 3–14)
ANION GAP SERPL CALCULATED.3IONS-SCNC: 8 MMOL/L (ref 3–14)
ANISOCYTOSIS BLD QL SMEAR: ABNORMAL
AST SERPL W P-5'-P-CCNC: 95 U/L (ref 0–45)
BASE EXCESS BLDA CALC-SCNC: 2.4 MMOL/L
BASE EXCESS BLDA CALC-SCNC: 2.8 MMOL/L
BASE EXCESS BLDA CALC-SCNC: 4.7 MMOL/L
BASE EXCESS BLDA CALC-SCNC: 5.5 MMOL/L
BASE EXCESS BLDA CALC-SCNC: 6.2 MMOL/L
BASE EXCESS BLDA CALC-SCNC: 6.9 MMOL/L
BASE EXCESS BLDA CALC-SCNC: 7.1 MMOL/L
BASE EXCESS BLDA CALC-SCNC: 7.4 MMOL/L
BASE EXCESS BLDA CALC-SCNC: 9.1 MMOL/L
BASOPHILS # BLD AUTO: 0 10E9/L (ref 0–0.2)
BASOPHILS NFR BLD AUTO: 0 %
BILIRUB SERPL-MCNC: 1.1 MG/DL (ref 0.2–1.3)
BUN SERPL-MCNC: 17 MG/DL (ref 7–30)
BUN SERPL-MCNC: 18 MG/DL (ref 7–30)
BUN SERPL-MCNC: 18 MG/DL (ref 7–30)
BUN SERPL-MCNC: 19 MG/DL (ref 7–30)
BUN SERPL-MCNC: 20 MG/DL (ref 7–30)
BUN SERPL-MCNC: 21 MG/DL (ref 7–30)
CA-I BLD-MCNC: 10.3 MG/DL (ref 4.4–5.2)
CA-I BLD-MCNC: 10.3 MG/DL (ref 4.4–5.2)
CA-I BLD-MCNC: 10.7 MG/DL (ref 4.4–5.2)
CA-I BLD-MCNC: 7.3 MG/DL (ref 4.4–5.2)
CA-I BLD-MCNC: 8.5 MG/DL (ref 4.4–5.2)
CA-I BLD-MCNC: 8.8 MG/DL (ref 4.4–5.2)
CA-I BLD-MCNC: 9.7 MG/DL (ref 4.4–5.2)
CA-I BLD-MCNC: 9.9 MG/DL (ref 4.4–5.2)
CA-I BLD-MCNC: 9.9 MG/DL (ref 4.4–5.2)
CA-I SERPL ISE-MCNC: 10.5 MG/DL (ref 4.4–5.2)
CA-I SERPL ISE-MCNC: 10.8 MG/DL (ref 4.4–5.2)
CA-I SERPL ISE-MCNC: 7.6 MG/DL (ref 4.4–5.2)
CA-I SERPL ISE-MCNC: 9.7 MG/DL (ref 4.4–5.2)
CALCIUM SERPL-MCNC: 12.4 MG/DL (ref 8.5–10.1)
CALCIUM SERPL-MCNC: 14.7 MG/DL (ref 8.5–10.1)
CALCIUM SERPL-MCNC: 14.8 MG/DL (ref 8.5–10.1)
CALCIUM SERPL-MCNC: 14.9 MG/DL (ref 8.5–10.1)
CALCIUM SERPL-MCNC: 15.3 MG/DL (ref 8.5–10.1)
CALCIUM SERPL-MCNC: 16.9 MG/DL (ref 8.5–10.1)
CHLORIDE SERPL-SCNC: 108 MMOL/L (ref 94–109)
CHLORIDE SERPL-SCNC: 112 MMOL/L (ref 94–109)
CHLORIDE SERPL-SCNC: 113 MMOL/L (ref 94–109)
CHLORIDE SERPL-SCNC: 114 MMOL/L (ref 94–109)
CHLORIDE SERPL-SCNC: 115 MMOL/L (ref 94–109)
CHLORIDE SERPL-SCNC: 115 MMOL/L (ref 94–109)
CO2 SERPL-SCNC: 29 MMOL/L (ref 20–32)
CO2 SERPL-SCNC: 31 MMOL/L (ref 20–32)
CO2 SERPL-SCNC: 31 MMOL/L (ref 20–32)
CO2 SERPL-SCNC: 32 MMOL/L (ref 20–32)
CO2 SERPL-SCNC: 33 MMOL/L (ref 20–32)
CO2 SERPL-SCNC: 34 MMOL/L (ref 20–32)
CREAT SERPL-MCNC: 1.06 MG/DL (ref 0.52–1.04)
CREAT SERPL-MCNC: 1.13 MG/DL (ref 0.52–1.04)
CREAT SERPL-MCNC: 1.15 MG/DL (ref 0.52–1.04)
CREAT SERPL-MCNC: 1.23 MG/DL (ref 0.52–1.04)
CREAT SERPL-MCNC: 1.23 MG/DL (ref 0.52–1.04)
CREAT SERPL-MCNC: 1.24 MG/DL (ref 0.52–1.04)
DIFFERENTIAL METHOD BLD: ABNORMAL
EOSINOPHIL # BLD AUTO: 0 10E9/L (ref 0–0.7)
EOSINOPHIL NFR BLD AUTO: 0 %
ERYTHROCYTE [DISTWIDTH] IN BLOOD BY AUTOMATED COUNT: 18 % (ref 10–15)
GFR SERPL CREATININE-BSD FRML MDRD: 46 ML/MIN/1.7M2
GFR SERPL CREATININE-BSD FRML MDRD: 47 ML/MIN/1.7M2
GFR SERPL CREATININE-BSD FRML MDRD: 47 ML/MIN/1.7M2
GFR SERPL CREATININE-BSD FRML MDRD: 51 ML/MIN/1.7M2
GFR SERPL CREATININE-BSD FRML MDRD: 52 ML/MIN/1.7M2
GFR SERPL CREATININE-BSD FRML MDRD: 56 ML/MIN/1.7M2
GLUCOSE BLDC GLUCOMTR-MCNC: 131 MG/DL (ref 70–99)
GLUCOSE BLDC GLUCOMTR-MCNC: 135 MG/DL (ref 70–99)
GLUCOSE BLDC GLUCOMTR-MCNC: 147 MG/DL (ref 70–99)
GLUCOSE BLDC GLUCOMTR-MCNC: 149 MG/DL (ref 70–99)
GLUCOSE BLDC GLUCOMTR-MCNC: 151 MG/DL (ref 70–99)
GLUCOSE BLDC GLUCOMTR-MCNC: 152 MG/DL (ref 70–99)
GLUCOSE BLDC GLUCOMTR-MCNC: 154 MG/DL (ref 70–99)
GLUCOSE BLDC GLUCOMTR-MCNC: 156 MG/DL (ref 70–99)
GLUCOSE BLDC GLUCOMTR-MCNC: 156 MG/DL (ref 70–99)
GLUCOSE BLDC GLUCOMTR-MCNC: 159 MG/DL (ref 70–99)
GLUCOSE BLDC GLUCOMTR-MCNC: 160 MG/DL (ref 70–99)
GLUCOSE BLDC GLUCOMTR-MCNC: 161 MG/DL (ref 70–99)
GLUCOSE BLDC GLUCOMTR-MCNC: 162 MG/DL (ref 70–99)
GLUCOSE BLDC GLUCOMTR-MCNC: 163 MG/DL (ref 70–99)
GLUCOSE BLDC GLUCOMTR-MCNC: 164 MG/DL (ref 70–99)
GLUCOSE BLDC GLUCOMTR-MCNC: 164 MG/DL (ref 70–99)
GLUCOSE BLDC GLUCOMTR-MCNC: 165 MG/DL (ref 70–99)
GLUCOSE BLDC GLUCOMTR-MCNC: 166 MG/DL (ref 70–99)
GLUCOSE BLDC GLUCOMTR-MCNC: 170 MG/DL (ref 70–99)
GLUCOSE BLDC GLUCOMTR-MCNC: 171 MG/DL (ref 70–99)
GLUCOSE BLDC GLUCOMTR-MCNC: 175 MG/DL (ref 70–99)
GLUCOSE BLDC GLUCOMTR-MCNC: 176 MG/DL (ref 70–99)
GLUCOSE BLDC GLUCOMTR-MCNC: 177 MG/DL (ref 70–99)
GLUCOSE BLDC GLUCOMTR-MCNC: 180 MG/DL (ref 70–99)
GLUCOSE BLDC GLUCOMTR-MCNC: 181 MG/DL (ref 70–99)
GLUCOSE BLDC GLUCOMTR-MCNC: 183 MG/DL (ref 70–99)
GLUCOSE BLDC GLUCOMTR-MCNC: 184 MG/DL (ref 70–99)
GLUCOSE BLDC GLUCOMTR-MCNC: 187 MG/DL (ref 70–99)
GLUCOSE BLDC GLUCOMTR-MCNC: 188 MG/DL (ref 70–99)
GLUCOSE BLDC GLUCOMTR-MCNC: 190 MG/DL (ref 70–99)
GLUCOSE BLDC GLUCOMTR-MCNC: 190 MG/DL (ref 70–99)
GLUCOSE BLDC GLUCOMTR-MCNC: 191 MG/DL (ref 70–99)
GLUCOSE BLDC GLUCOMTR-MCNC: 192 MG/DL (ref 70–99)
GLUCOSE BLDC GLUCOMTR-MCNC: 194 MG/DL (ref 70–99)
GLUCOSE BLDC GLUCOMTR-MCNC: 196 MG/DL (ref 70–99)
GLUCOSE BLDC GLUCOMTR-MCNC: 198 MG/DL (ref 70–99)
GLUCOSE BLDC GLUCOMTR-MCNC: 199 MG/DL (ref 70–99)
GLUCOSE BLDC GLUCOMTR-MCNC: 201 MG/DL (ref 70–99)
GLUCOSE BLDC GLUCOMTR-MCNC: 202 MG/DL (ref 70–99)
GLUCOSE BLDC GLUCOMTR-MCNC: 204 MG/DL (ref 70–99)
GLUCOSE SERPL-MCNC: 174 MG/DL (ref 70–99)
GLUCOSE SERPL-MCNC: 175 MG/DL (ref 70–99)
GLUCOSE SERPL-MCNC: 178 MG/DL (ref 70–99)
GLUCOSE SERPL-MCNC: 182 MG/DL (ref 70–99)
GLUCOSE SERPL-MCNC: 217 MG/DL (ref 70–99)
GLUCOSE SERPL-MCNC: 222 MG/DL (ref 70–99)
HCO3 BLD-SCNC: 29 MMOL/L (ref 21–28)
HCO3 BLD-SCNC: 30 MMOL/L (ref 21–28)
HCO3 BLD-SCNC: 30 MMOL/L (ref 21–28)
HCO3 BLD-SCNC: 32 MMOL/L (ref 21–28)
HCO3 BLD-SCNC: 33 MMOL/L (ref 21–28)
HCO3 BLD-SCNC: 34 MMOL/L (ref 21–28)
HCO3 BLD-SCNC: 34 MMOL/L (ref 21–28)
HCT VFR BLD AUTO: 23.9 % (ref 35–47)
HGB BLD-MCNC: 7.6 G/DL (ref 11.7–15.7)
LACTATE BLD-SCNC: 3.2 MMOL/L (ref 0.7–2)
LACTATE BLD-SCNC: 3.8 MMOL/L (ref 0.7–2)
LACTATE BLD-SCNC: 4 MMOL/L (ref 0.7–2)
LACTATE BLD-SCNC: 4.1 MMOL/L (ref 0.7–2)
LACTATE BLD-SCNC: 4.3 MMOL/L (ref 0.7–2)
LACTATE BLD-SCNC: 4.5 MMOL/L (ref 0.7–2)
LACTATE BLD-SCNC: 5.7 MMOL/L (ref 0.7–2)
LYMPHOCYTES # BLD AUTO: 0.5 10E9/L (ref 0.8–5.3)
LYMPHOCYTES NFR BLD AUTO: 2.6 %
MAGNESIUM SERPL-MCNC: 1.9 MG/DL (ref 1.6–2.3)
MAGNESIUM SERPL-MCNC: 2.1 MG/DL (ref 1.6–2.3)
MAGNESIUM SERPL-MCNC: 2.2 MG/DL (ref 1.6–2.3)
MAGNESIUM SERPL-MCNC: 2.3 MG/DL (ref 1.6–2.3)
MAGNESIUM SERPL-MCNC: 2.3 MG/DL (ref 1.6–2.3)
MAGNESIUM SERPL-MCNC: 2.4 MG/DL (ref 1.6–2.3)
MCH RBC QN AUTO: 29.9 PG (ref 26.5–33)
MCHC RBC AUTO-ENTMCNC: 31.8 G/DL (ref 31.5–36.5)
MCV RBC AUTO: 94 FL (ref 78–100)
MONOCYTES # BLD AUTO: 0.5 10E9/L (ref 0–1.3)
MONOCYTES NFR BLD AUTO: 2.6 %
MYELOCYTES # BLD: 0.5 10E9/L
MYELOCYTES NFR BLD MANUAL: 2.6 %
NEUTROPHILS # BLD AUTO: 17.4 10E9/L (ref 1.6–8.3)
NEUTROPHILS NFR BLD AUTO: 91.4 %
NRBC # BLD AUTO: 1.3 10*3/UL
NRBC BLD AUTO-RTO: 7 /100
O2/TOTAL GAS SETTING VFR VENT: 100 %
O2/TOTAL GAS SETTING VFR VENT: 40 %
O2/TOTAL GAS SETTING VFR VENT: 50 %
O2/TOTAL GAS SETTING VFR VENT: 60 %
O2/TOTAL GAS SETTING VFR VENT: 80 %
O2/TOTAL GAS SETTING VFR VENT: 80 %
O2/TOTAL GAS SETTING VFR VENT: 90 %
PCO2 BLD: 43 MM HG (ref 35–45)
PCO2 BLD: 46 MM HG (ref 35–45)
PCO2 BLD: 49 MM HG (ref 35–45)
PCO2 BLD: 51 MM HG (ref 35–45)
PCO2 BLD: 55 MM HG (ref 35–45)
PCO2 BLD: 59 MM HG (ref 35–45)
PCO2 BLD: 62 MM HG (ref 35–45)
PCO2 BLD: 64 MM HG (ref 35–45)
PCO2 BLD: 66 MM HG (ref 35–45)
PH BLD: 7.29 PH (ref 7.35–7.45)
PH BLD: 7.29 PH (ref 7.35–7.45)
PH BLD: 7.32 PH (ref 7.35–7.45)
PH BLD: 7.36 PH (ref 7.35–7.45)
PH BLD: 7.37 PH (ref 7.35–7.45)
PH BLD: 7.43 PH (ref 7.35–7.45)
PH BLD: 7.44 PH (ref 7.35–7.45)
PH BLD: 7.44 PH (ref 7.35–7.45)
PH BLD: 7.45 PH (ref 7.35–7.45)
PHOSPHATE SERPL-MCNC: 1.8 MG/DL (ref 2.5–4.5)
PHOSPHATE SERPL-MCNC: 4.2 MG/DL (ref 2.5–4.5)
PHOSPHATE SERPL-MCNC: 5.4 MG/DL (ref 2.5–4.5)
PHOSPHATE SERPL-MCNC: 6 MG/DL (ref 2.5–4.5)
PHOSPHATE SERPL-MCNC: 6.1 MG/DL (ref 2.5–4.5)
PHOSPHATE SERPL-MCNC: 6.6 MG/DL (ref 2.5–4.5)
PLATELET # BLD AUTO: 101 10E9/L (ref 150–450)
PLATELET # BLD EST: ABNORMAL 10*3/UL
PO2 BLD: 103 MM HG (ref 80–105)
PO2 BLD: 128 MM HG (ref 80–105)
PO2 BLD: 137 MM HG (ref 80–105)
PO2 BLD: 61 MM HG (ref 80–105)
PO2 BLD: 65 MM HG (ref 80–105)
PO2 BLD: 75 MM HG (ref 80–105)
PO2 BLD: 78 MM HG (ref 80–105)
PO2 BLD: 82 MM HG (ref 80–105)
PO2 BLD: 98 MM HG (ref 80–105)
POTASSIUM SERPL-SCNC: 2.7 MMOL/L (ref 3.4–5.3)
POTASSIUM SERPL-SCNC: 3.2 MMOL/L (ref 3.4–5.3)
POTASSIUM SERPL-SCNC: 3.3 MMOL/L (ref 3.4–5.3)
POTASSIUM SERPL-SCNC: 3.3 MMOL/L (ref 3.4–5.3)
POTASSIUM SERPL-SCNC: 3.7 MMOL/L (ref 3.4–5.3)
POTASSIUM SERPL-SCNC: 5.3 MMOL/L (ref 3.4–5.3)
PROCALCITONIN SERPL-MCNC: 2.77 NG/ML
PROMYELOCYTES # BLD MANUAL: 0.2 10E9/L
PROMYELOCYTES NFR BLD MANUAL: 0.8 %
PROT SERPL-MCNC: 4 G/DL (ref 6.8–8.8)
RBC # BLD AUTO: 2.54 10E12/L (ref 3.8–5.2)
SODIUM SERPL-SCNC: 148 MMOL/L (ref 133–144)
SODIUM SERPL-SCNC: 148 MMOL/L (ref 133–144)
SODIUM SERPL-SCNC: 152 MMOL/L (ref 133–144)
SODIUM SERPL-SCNC: 153 MMOL/L (ref 133–144)
SODIUM SERPL-SCNC: 154 MMOL/L (ref 133–144)
SODIUM SERPL-SCNC: 154 MMOL/L (ref 133–144)
WBC # BLD AUTO: 19 10E9/L (ref 4–11)

## 2017-12-17 PROCEDURE — 80048 BASIC METABOLIC PNL TOTAL CA: CPT | Performed by: INTERNAL MEDICINE

## 2017-12-17 PROCEDURE — 27210995 ZZH RX 272: Performed by: STUDENT IN AN ORGANIZED HEALTH CARE EDUCATION/TRAINING PROGRAM

## 2017-12-17 PROCEDURE — 27210995 ZZH RX 272: Performed by: INTERNAL MEDICINE

## 2017-12-17 PROCEDURE — 71010 XR CHEST PORT 1 VW: CPT

## 2017-12-17 PROCEDURE — 82330 ASSAY OF CALCIUM: CPT | Performed by: STUDENT IN AN ORGANIZED HEALTH CARE EDUCATION/TRAINING PROGRAM

## 2017-12-17 PROCEDURE — 25000128 H RX IP 250 OP 636

## 2017-12-17 PROCEDURE — 85025 COMPLETE CBC W/AUTO DIFF WBC: CPT | Performed by: INTERNAL MEDICINE

## 2017-12-17 PROCEDURE — 71010 XR CHEST PORT 1 VW: CPT | Mod: 77

## 2017-12-17 PROCEDURE — 25000132 ZZH RX MED GY IP 250 OP 250 PS 637: Performed by: INTERNAL MEDICINE

## 2017-12-17 PROCEDURE — 40000014 ZZH STATISTIC ARTERIAL MONITORING DAILY

## 2017-12-17 PROCEDURE — 93005 ELECTROCARDIOGRAM TRACING: CPT

## 2017-12-17 PROCEDURE — 82330 ASSAY OF CALCIUM: CPT | Performed by: INTERNAL MEDICINE

## 2017-12-17 PROCEDURE — 25000128 H RX IP 250 OP 636: Performed by: STUDENT IN AN ORGANIZED HEALTH CARE EDUCATION/TRAINING PROGRAM

## 2017-12-17 PROCEDURE — 94640 AIRWAY INHALATION TREATMENT: CPT

## 2017-12-17 PROCEDURE — 40000275 ZZH STATISTIC RCP TIME EA 10 MIN

## 2017-12-17 PROCEDURE — 00000146 ZZHCL STATISTIC GLUCOSE BY METER IP

## 2017-12-17 PROCEDURE — 94003 VENT MGMT INPAT SUBQ DAY: CPT

## 2017-12-17 PROCEDURE — 83735 ASSAY OF MAGNESIUM: CPT | Performed by: INTERNAL MEDICINE

## 2017-12-17 PROCEDURE — 93010 ELECTROCARDIOGRAM REPORT: CPT | Performed by: INTERNAL MEDICINE

## 2017-12-17 PROCEDURE — 83605 ASSAY OF LACTIC ACID: CPT | Performed by: INTERNAL MEDICINE

## 2017-12-17 PROCEDURE — 25000131 ZZH RX MED GY IP 250 OP 636 PS 637: Performed by: INTERNAL MEDICINE

## 2017-12-17 PROCEDURE — 25800025 ZZH RX 258: Performed by: STUDENT IN AN ORGANIZED HEALTH CARE EDUCATION/TRAINING PROGRAM

## 2017-12-17 PROCEDURE — 25000128 H RX IP 250 OP 636: Performed by: INTERNAL MEDICINE

## 2017-12-17 PROCEDURE — 99291 CRITICAL CARE FIRST HOUR: CPT | Mod: GC | Performed by: INTERNAL MEDICINE

## 2017-12-17 PROCEDURE — 80053 COMPREHEN METABOLIC PANEL: CPT | Performed by: INTERNAL MEDICINE

## 2017-12-17 PROCEDURE — 84100 ASSAY OF PHOSPHORUS: CPT | Performed by: INTERNAL MEDICINE

## 2017-12-17 PROCEDURE — 82803 BLOOD GASES ANY COMBINATION: CPT | Performed by: INTERNAL MEDICINE

## 2017-12-17 PROCEDURE — 25000125 ZZHC RX 250: Performed by: INTERNAL MEDICINE

## 2017-12-17 PROCEDURE — 25000125 ZZHC RX 250: Performed by: STUDENT IN AN ORGANIZED HEALTH CARE EDUCATION/TRAINING PROGRAM

## 2017-12-17 PROCEDURE — 25000132 ZZH RX MED GY IP 250 OP 250 PS 637: Performed by: STUDENT IN AN ORGANIZED HEALTH CARE EDUCATION/TRAINING PROGRAM

## 2017-12-17 PROCEDURE — 84145 PROCALCITONIN (PCT): CPT | Performed by: INTERNAL MEDICINE

## 2017-12-17 PROCEDURE — 94640 AIRWAY INHALATION TREATMENT: CPT | Mod: 76

## 2017-12-17 PROCEDURE — 20000004 ZZH R&B ICU UMMC

## 2017-12-17 RX ORDER — FUROSEMIDE 10 MG/ML
40 INJECTION INTRAMUSCULAR; INTRAVENOUS
Status: DISCONTINUED | OUTPATIENT
Start: 2017-12-17 | End: 2017-12-17

## 2017-12-17 RX ORDER — METOPROLOL TARTRATE 1 MG/ML
5 INJECTION, SOLUTION INTRAVENOUS EVERY 5 MIN PRN
Status: DISCONTINUED | OUTPATIENT
Start: 2017-12-17 | End: 2017-12-22

## 2017-12-17 RX ORDER — BISACODYL 10 MG
10 SUPPOSITORY, RECTAL RECTAL 2 TIMES DAILY
Status: DISCONTINUED | OUTPATIENT
Start: 2017-12-17 | End: 2017-12-21

## 2017-12-17 RX ORDER — FUROSEMIDE 10 MG/ML
40 INJECTION INTRAMUSCULAR; INTRAVENOUS ONCE
Status: COMPLETED | OUTPATIENT
Start: 2017-12-17 | End: 2017-12-17

## 2017-12-17 RX ORDER — MINERAL OIL 100 G/100G
1 OIL RECTAL 3 TIMES DAILY
Status: DISCONTINUED | OUTPATIENT
Start: 2017-12-17 | End: 2017-12-17

## 2017-12-17 RX ORDER — FUROSEMIDE 10 MG/ML
20 INJECTION INTRAMUSCULAR; INTRAVENOUS ONCE
Status: COMPLETED | OUTPATIENT
Start: 2017-12-17 | End: 2017-12-17

## 2017-12-17 RX ORDER — SODIUM CHLORIDE, SODIUM LACTATE, POTASSIUM CHLORIDE, CALCIUM CHLORIDE 600; 310; 30; 20 MG/100ML; MG/100ML; MG/100ML; MG/100ML
INJECTION, SOLUTION INTRAVENOUS
Status: COMPLETED
Start: 2017-12-17 | End: 2017-12-17

## 2017-12-17 RX ORDER — POTASSIUM CHLORIDE 29.8 MG/ML
20 INJECTION INTRAVENOUS
Status: COMPLETED | OUTPATIENT
Start: 2017-12-17 | End: 2017-12-17

## 2017-12-17 RX ORDER — MINERAL OIL 100 G/100G
1 OIL RECTAL 2 TIMES DAILY
Status: DISPENSED | OUTPATIENT
Start: 2017-12-17 | End: 2017-12-18

## 2017-12-17 RX ORDER — SODIUM CHLORIDE 9 MG/ML
INJECTION, SOLUTION INTRAVENOUS
Status: COMPLETED
Start: 2017-12-17 | End: 2017-12-17

## 2017-12-17 RX ORDER — ATROPINE SULFATE 0.1 MG/ML
INJECTION INTRAVENOUS
Status: DISCONTINUED
Start: 2017-12-17 | End: 2017-12-21 | Stop reason: HOSPADM

## 2017-12-17 RX ORDER — SODIUM CHLORIDE 9 MG/ML
INJECTION, SOLUTION INTRAVENOUS CONTINUOUS
Status: DISCONTINUED | OUTPATIENT
Start: 2017-12-17 | End: 2017-12-27

## 2017-12-17 RX ADMIN — SODIUM CHLORIDE: 9 INJECTION, SOLUTION INTRAVENOUS at 07:33

## 2017-12-17 RX ADMIN — ALBUTEROL SULFATE 2.5 MG: 2.5 SOLUTION RESPIRATORY (INHALATION) at 12:52

## 2017-12-17 RX ADMIN — MAGNESIUM SULFATE IN DEXTROSE 1 G: 10 INJECTION, SOLUTION INTRAVENOUS at 09:41

## 2017-12-17 RX ADMIN — PROPOFOL 20 MCG/KG/MIN: 10 INJECTION, EMULSION INTRAVENOUS at 04:59

## 2017-12-17 RX ADMIN — POTASSIUM PHOSPHATE, MONOBASIC AND POTASSIUM PHOSPHATE, DIBASIC 20 MMOL: 224; 236 INJECTION, SOLUTION INTRAVENOUS at 18:02

## 2017-12-17 RX ADMIN — DEXTROSE MONOHYDRATE: 70 INJECTION, SOLUTION INTRAVENOUS at 06:13

## 2017-12-17 RX ADMIN — CEFTRIAXONE 2 G: 10 INJECTION, POWDER, FOR SOLUTION INTRAVENOUS at 14:25

## 2017-12-17 RX ADMIN — HYDROCORTISONE SODIUM SUCCINATE 100 MG: 100 INJECTION, POWDER, FOR SOLUTION INTRAMUSCULAR; INTRAVENOUS at 13:56

## 2017-12-17 RX ADMIN — POTASSIUM CHLORIDE 40 MEQ: 1.5 POWDER, FOR SOLUTION ORAL at 03:33

## 2017-12-17 RX ADMIN — SODIUM CHLORIDE: 9 INJECTION, SOLUTION INTRAVENOUS at 22:54

## 2017-12-17 RX ADMIN — LEVOTHYROXINE SODIUM ANHYDROUS 25 MCG: 100 INJECTION, POWDER, LYOPHILIZED, FOR SOLUTION INTRAVENOUS at 07:32

## 2017-12-17 RX ADMIN — CALCIUM CHLORIDE 1 G/HR: 100 INJECTION, SOLUTION INTRAVENOUS at 11:42

## 2017-12-17 RX ADMIN — Medication 150 MCG/HR: at 18:23

## 2017-12-17 RX ADMIN — FUROSEMIDE 10 MG/HR: 10 INJECTION, SOLUTION INTRAVENOUS at 05:00

## 2017-12-17 RX ADMIN — POTASSIUM CHLORIDE 20 MEQ: 400 INJECTION, SOLUTION INTRAVENOUS at 02:08

## 2017-12-17 RX ADMIN — HYDROCORTISONE SODIUM SUCCINATE 100 MG: 100 INJECTION, POWDER, FOR SOLUTION INTRAMUSCULAR; INTRAVENOUS at 21:35

## 2017-12-17 RX ADMIN — ALBUTEROL SULFATE 2.5 MG: 2.5 SOLUTION RESPIRATORY (INHALATION) at 16:40

## 2017-12-17 RX ADMIN — DEXTROSE MONOHYDRATE: 70 INJECTION, SOLUTION INTRAVENOUS at 10:39

## 2017-12-17 RX ADMIN — Medication 200 MCG/HR: at 00:11

## 2017-12-17 RX ADMIN — SODIUM CHLORIDE, POTASSIUM CHLORIDE, SODIUM LACTATE AND CALCIUM CHLORIDE 500 ML: 600; 310; 30; 20 INJECTION, SOLUTION INTRAVENOUS at 14:28

## 2017-12-17 RX ADMIN — POTASSIUM CHLORIDE 20 MEQ: 400 INJECTION, SOLUTION INTRAVENOUS at 09:44

## 2017-12-17 RX ADMIN — Medication 10 MG/HR: at 04:59

## 2017-12-17 RX ADMIN — SODIUM CHLORIDE 10 UNITS/KG/HR: 0.9 INJECTION, SOLUTION INTRAVENOUS at 04:17

## 2017-12-17 RX ADMIN — FUROSEMIDE 10 MG/HR: 10 INJECTION, SOLUTION INTRAVENOUS at 23:09

## 2017-12-17 RX ADMIN — THIAMINE HYDROCHLORIDE 100 MG: 100 INJECTION, SOLUTION INTRAMUSCULAR; INTRAVENOUS at 07:32

## 2017-12-17 RX ADMIN — POTASSIUM CHLORIDE 20 MEQ: 400 INJECTION, SOLUTION INTRAVENOUS at 13:57

## 2017-12-17 RX ADMIN — POTASSIUM CHLORIDE 20 MEQ: 400 INJECTION, SOLUTION INTRAVENOUS at 18:22

## 2017-12-17 RX ADMIN — POTASSIUM CHLORIDE 20 MEQ: 1.5 POWDER, FOR SOLUTION ORAL at 08:47

## 2017-12-17 RX ADMIN — PANTOPRAZOLE SODIUM 40 MG: 40 INJECTION, POWDER, FOR SOLUTION INTRAVENOUS at 07:32

## 2017-12-17 RX ADMIN — METOPROLOL TARTRATE 5 MG: 5 INJECTION INTRAVENOUS at 07:38

## 2017-12-17 RX ADMIN — POTASSIUM CHLORIDE 20 MEQ: 400 INJECTION, SOLUTION INTRAVENOUS at 08:47

## 2017-12-17 RX ADMIN — POTASSIUM CHLORIDE 20 MEQ: 400 INJECTION, SOLUTION INTRAVENOUS at 03:12

## 2017-12-17 RX ADMIN — ALBUTEROL SULFATE 2.5 MG: 2.5 SOLUTION RESPIRATORY (INHALATION) at 03:55

## 2017-12-17 RX ADMIN — SODIUM CHLORIDE 10 UNITS/KG/HR: 0.9 INJECTION, SOLUTION INTRAVENOUS at 18:37

## 2017-12-17 RX ADMIN — DEXTROSE MONOHYDRATE: 70 INJECTION, SOLUTION INTRAVENOUS at 01:57

## 2017-12-17 RX ADMIN — FUROSEMIDE 40 MG: 10 INJECTION, SOLUTION INTRAVENOUS at 20:52

## 2017-12-17 RX ADMIN — ALBUTEROL SULFATE 2.5 MG: 2.5 SOLUTION RESPIRATORY (INHALATION) at 00:39

## 2017-12-17 RX ADMIN — NOREPINEPHRINE BITARTRATE 0.18 MCG/KG/MIN: 1 INJECTION INTRAVENOUS at 20:24

## 2017-12-17 RX ADMIN — MINERAL OIL 1 ENEMA: 118 ENEMA RECTAL at 16:30

## 2017-12-17 RX ADMIN — MAGNESIUM SULFATE IN DEXTROSE 1 G: 10 INJECTION, SOLUTION INTRAVENOUS at 14:34

## 2017-12-17 RX ADMIN — METOPROLOL TARTRATE 5 MG: 5 INJECTION INTRAVENOUS at 07:24

## 2017-12-17 RX ADMIN — DEXTROSE MONOHYDRATE: 70 INJECTION, SOLUTION INTRAVENOUS at 19:36

## 2017-12-17 RX ADMIN — POTASSIUM CHLORIDE 20 MEQ: 400 INJECTION, SOLUTION INTRAVENOUS at 17:25

## 2017-12-17 RX ADMIN — PROPOFOL 20 MCG/KG/MIN: 10 INJECTION, EMULSION INTRAVENOUS at 17:19

## 2017-12-17 RX ADMIN — ALBUTEROL SULFATE 2.5 MG: 2.5 SOLUTION RESPIRATORY (INHALATION) at 23:55

## 2017-12-17 RX ADMIN — ALBUTEROL SULFATE 2.5 MG: 2.5 SOLUTION RESPIRATORY (INHALATION) at 19:41

## 2017-12-17 RX ADMIN — CALCIUM CHLORIDE 2 G/HR: 100 INJECTION, SOLUTION INTRAVENOUS at 04:48

## 2017-12-17 RX ADMIN — METRONIDAZOLE 500 MG: 500 INJECTION, SOLUTION INTRAVENOUS at 13:57

## 2017-12-17 RX ADMIN — HYDROCORTISONE SODIUM SUCCINATE 100 MG: 100 INJECTION, POWDER, FOR SOLUTION INTRAMUSCULAR; INTRAVENOUS at 05:28

## 2017-12-17 RX ADMIN — DEXTROSE MONOHYDRATE 3 MCG/KG/MIN: 5 INJECTION, SOLUTION INTRAVENOUS at 22:36

## 2017-12-17 RX ADMIN — FOLIC ACID 1 MG: 5 INJECTION, SOLUTION INTRAMUSCULAR; INTRAVENOUS; SUBCUTANEOUS at 07:32

## 2017-12-17 RX ADMIN — MINERAL OIL AND WHITE PETROLATUM: 150; 830 OINTMENT OPHTHALMIC at 21:36

## 2017-12-17 RX ADMIN — BISACODYL 10 MG: 10 SUPPOSITORY RECTAL at 14:34

## 2017-12-17 RX ADMIN — Medication 150 MCG/HR: at 08:47

## 2017-12-17 RX ADMIN — METRONIDAZOLE 500 MG: 500 INJECTION, SOLUTION INTRAVENOUS at 20:28

## 2017-12-17 RX ADMIN — ENOXAPARIN SODIUM 40 MG: 40 INJECTION SUBCUTANEOUS at 10:39

## 2017-12-17 RX ADMIN — POTASSIUM CHLORIDE 20 MEQ: 400 INJECTION, SOLUTION INTRAVENOUS at 05:59

## 2017-12-17 RX ADMIN — DEXTROSE MONOHYDRATE: 70 INJECTION, SOLUTION INTRAVENOUS at 14:34

## 2017-12-17 RX ADMIN — Medication 10 MG/HR: at 13:01

## 2017-12-17 RX ADMIN — ALBUTEROL SULFATE 2.5 MG: 2.5 SOLUTION RESPIRATORY (INHALATION) at 08:51

## 2017-12-17 RX ADMIN — FUROSEMIDE 20 MG: 10 INJECTION, SOLUTION INTRAVENOUS at 22:52

## 2017-12-17 RX ADMIN — SODIUM PHOSPHATE 1 ENEMA: 7; 19 ENEMA RECTAL at 03:42

## 2017-12-17 RX ADMIN — POTASSIUM CHLORIDE 20 MEQ: 400 INJECTION, SOLUTION INTRAVENOUS at 07:10

## 2017-12-17 RX ADMIN — THIAMINE HYDROCHLORIDE 100 MG: 100 INJECTION, SOLUTION INTRAMUSCULAR; INTRAVENOUS at 20:26

## 2017-12-17 RX ADMIN — POTASSIUM CHLORIDE 20 MEQ: 1.5 POWDER, FOR SOLUTION ORAL at 17:25

## 2017-12-17 RX ADMIN — Medication 2 G: at 06:23

## 2017-12-17 RX ADMIN — POTASSIUM CHLORIDE 40 MEQ: 1.5 POWDER, FOR SOLUTION ORAL at 01:43

## 2017-12-17 NOTE — PLAN OF CARE
Problem: Overdose, Ingestion/Inhalants (Adult)  Goal: Signs and Symptoms of Listed Potential Problems Will be Absent, Minimized or Managed (Overdose, Ingestion/Inhalants)  Signs and symptoms of listed potential problems will be absent, minimized or managed by discharge/transition of care (reference Overdose, Ingestion/Inhalants (Adult) CPG).   Outcome: Declining  Cis gtt stopped this AM, pt awoken w/i 30-45 minutes.  Extreme agitation/restlessness despite PRN doses of Fentanyl & Ativan.  No movement to command but localizing all extremities, opens eyes to voice.  Ativan gtt switched to Versed, Propofol added.  Responds better to Propofol boluses than Versed/Fentanyl.  HR SR-ST w/PVC noted when K is low.  Titrating Levo per orders, max dose up to 0.4 mcg/kg/min.  No changes to vent settings, continuing to trend ABGs Q 4 hrs.  Drops O2 sats to 86-90% w/agitation.  Abdominal/pelvic CT completed - no ileus, okay to give K+ replacement via OG but remain strict NPO.  OG to LIS, < 50 mL output.  Unable to hear BS.  Fleet enema given w/no results.  Napoles, diuersing w/PRN Lasix x2 w/plans to restart Lasix gtt.  Goal = net even.  Replacing electrolytes Q 4 hrs - received total of 60 mEq K, 30 mmol of phos and 1 g of Mag.  See results review for remaining labs.  Heparin gtt d/c, Lovenox started.   at bedside, updated on pt's status and POC.  Will continue to monitor and notify MD regarding changes in pt's condition.

## 2017-12-17 NOTE — PLAN OF CARE
Problem: Patient Care Overview  Goal: Plan of Care/Patient Progress Review  Outcome: Improving  D: Calcium channel blocker overdose  I/A: Pt currently sedated (Versed, Fentanyl, Propofol); RASS -3, withdraws to pain/response to stimulation. Very agitated at beginning of shift. HR 90-100s, occass PVCs, MAP >60 (Levo titrated off, Insulin titrating down). Vent: 40-60% FiO2/10 Peep/550 TV/27 RR. Min secretions. Good UOP- Lasix gtt restarted, Fleets enema given, x3 BMs- hypoactive/absent bowel sounds. OG at low int suction. Lytes replaced per protocol. Extra KCL replacement given. Flipped into Afib RVR at 0640- EKG obtained, BP stable, MD notified, awaiting orders.  P: Cont POC, titrate drips as able, monitor q4 hr labs.

## 2017-12-17 NOTE — PROGRESS NOTES
[facility]      MICU Progress Note  Ana Laura Wharton MRN: [id number]  1970  Date of Admission:12/13/2017  Primary care provider: Ander Fuchs      Assessment and Plan:     Ana Laura Wharton is a 46 yo female with hx of bipolar disorder with depression, polysubstance abuse, & prior suicide attempts, admitted to [facility] MICU  Following calcium channel blocker (CCB) overdose, tylenol overdose & alcohol overdose. Course complicated by lactic acidosis, hypoventilation, acute renal failure, & severe hypotension/vasogenic shock. Currently intubated & sedated. With slowly improving blood pressures, now off of pressors.     NEURO  Sedation/Analgesia:  Midazolam gtt + additional PRN  Fentanyl gtt + additional PRN  Propofol gtt as needed if pressures tolerate    Paralytic: cisatracurium gtt d/c 12/16    Bipolar I disorder with anxiety & depression  Suicide attempt with polysubstance abuse (amlodipine, nyquil (acetaminophen), alcohol (vodka))  - Manage OD as per below  - Poison control recs: ical @10, may take days-week to clear  - Will monitor for withdrawal symptoms once weaned from sedation (ETOH 0.17 at OSH). IV thiamine & folate  - Holding home zyprexa & remeron. If agitated, will increase versed  - Psych consult for OD when awake    Agitation: Patient at high risk of alcohol & benzo withdrawal  - Slow wean of sedation when medically appropriate    RESPIRATORY  Vent Settings:  Ventilation Mode: CMV/AC  FiO2 (%): 40 %  Rate Set (breaths/minute): 27 breaths/min  Tidal Volume Set (mL): 550 mL  PEEP (cm H2O): 10 cmH2O  Oxygen Concentration (%): 50 %  Resp: 28    Hypoxic Respiratory Failure: Due initially to hypoventilation due to overdose, now due to V/Q mismatch in the setting of Pulm Edema  - Vent settings as per above  - Titrate O2 to sats >90%, q4 ABG    CARDIOVASCULAR  Hypotension/Vasodilatory shock/Vasoplegia: Requiring pressors. Side effect of CCB overdose. ECHO shows normal EF of 70%. Normal RV  function.  - NE gtt, titrate to MAP >60, currently off  - VAP gtt if needed, currently off  - D50/Insulin gtt 10U/kg/hr. Will start to wean by 1U/kg/hr over the course of today  - Calcium chloride gtt, iCal goal of 10. Will slowly wean Calcium by 0.5g/hr once normotensive, off pressors & off insulin  - Bicarbonate gtt discontinued as pH >7.25  - Continue stress dose steroids until off pressors, insulin & calcium    Bradycardia, resolved: Initially presented with bradycardia. Insulin gtt & dextrose  - Continue D20/Insulin gtt, will wean once off of pressors  - Management of hypotension as per above    Paroxysmal atrial fibrillation: Briefly in Afib with RVR this AM. Received metoprolol x 2. Converted to NSR. Will hopefully improve once fluid status improves  - Metoprolol IV PRN  - Would start amio load if persistent with Rates >120    RENAL / FLUIDS / ELECTROLYTES  Baseline creatinine: 0.7-0.8.     Acute Renal Failure: As high as 1.6. Now 1.06. Due to CCB overdose. Volume overload, pulmonary edema. Cr slowly improving with diuresis & forward flow  - Lasix gtt/intermittent lasix. Goal of fluid even for this hospitalization  - I/Os, Monitor Cr, UOP  - Monitory lytes, replace    Lactic Acidosis/AGMA: 4-8. Due to hypoperfusion. Lactate may continue to be elevated until after CCB has cleared  - Continue to trend  - Goal of pH>7.25, bicarb gtt as necessary    GASTROINTESTINAL  MELD-Na score: 11 at 12/17/2017  8:01 AM  MELD score: 11 at 12/17/2017  8:01 AM  Calculated from:  Serum Creatinine: 1.06 mg/dL at 12/17/2017  8:01 AM  Serum Sodium: 154 mmol/L (Rounded to 137) at 12/17/2017  8:01 AM  Total Bilirubin: 1.1 mg/dL at 12/17/2017  4:53 AM  INR(ratio): 1.29 at 12/16/2017  5:05 AM  Age: 47 years    Acetaminophen overdose  Elevated Transaminases  Found next to bottle of Nyquil. AST/ALTs now normal s/p NAC gtt.   - Trend LFTs, INR    OG: OG to LIS    Hx of C.diff: Hx of fecal transplant. Has had formed stool    Tube Feeds:  Re-evaluate daily    INFECTIOUS DISEASE     Sepsis ppx  - Treated with Vanc/Cefepime for 48 hours    CT with questionable aspiration in lower lungs. 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 On ppx abx. Now on steroids. CRP & procal wnl. Overall downtrending  - Monitor daily     Anemia: Normocytic Anemia. Likely due to initial dilution & chronic illness  - Monitor daily  - Tranfuse < 7    Thrombocytopenia: Unknown etiology. Does not appear to be HIT related. Stable at 100  - Monitor daily    INR: As high as 1.55 in the setting of overdose & liver injury. 1.29 today  - Monitor daily    ENDOCRINE  On insulin/D50 for CBB overdose, monitoring sugars q1    Hypothyroidism: Levothyroxine IV, switch back to oral once able    SKIN CARE  No acute concerns    Prophylaxis: DVT: Lovenox: GI: PPI  Code status: Full  Lines: RIJ, RAL, PIV x3    Patient was seen and discussed with Dr. Woods, MICU attending    Osei Calabrese MD  Internal Medicine/Pediatrics, PGY-3    Interval History:     Last evening, patient increasingly agitated requiring versed & propofol. Insulin running at 10U/kg/hr & calcium gtt, with improvement in BPs. Diuresis restarted to help make even fluid goal. Weaned off pressors at 4am today. Slowly weaning insulin, then will wean calcium. Afebrile.    4 Point Review of systems including CV, Respiratory, GI and  were negative unless otherwise noted.    PHYSICAL EXAM  Temp:  [97.5  F (36.4  C)-99.3  F (37.4  C)] 97.5  F (36.4  C)  Heart Rate:  [] 92  Resp:  [27-28] 28  MAP:  [51 mmHg-97 mmHg] 76 mmHg  Arterial Line BP: ()/(34-70) 133/55  FiO2 (%):  [40 %-60 %] 40  %  SpO2:  [91 %-100 %] 92 %  Ventilation Mode: CMV/AC  FiO2 (%): 40 %  Rate Set (breaths/minute): 27 breaths/min  Tidal Volume Set (mL): 550 mL  PEEP (cm H2O): 10 cmH2O  Oxygen Concentration (%): 50 %  Resp: 28  Vitals:    12/15/17 0500 12/16/17 0400 12/17/17 0400   Weight: 68.3 kg (150 lb 9.2 oz) 65.3 kg (143 lb 15.4 oz) 66.6 kg (146 lb 13.2 oz)       Intake/Output Summary (Last 24 hours) at 12/17/17 1111  Last data filed at 12/17/17 1100   Gross per 24 hour   Intake          6553.77 ml   Output             8290 ml   Net         -1736.23 ml     GEN: Sedated & intubated  EYES: Closed, pupils pinpoint & non-reactive  CV: RRR, no murmurs, peripheral pulses  PULM: Coarse breath sounds bilaterally, no wheezing  GI: bowel sounds absent, soft abdomen  : Napoles catheter in place  EXTREMITIES: WWP, 1+ edema in all four extremities  NEURO: sedated, withdraws to pain  SKIN: No lesions    DIAGNOSTIC STUDIES  ROUTINE ICU LABS (Last four results)  CMP  Recent Labs  Lab 12/17/17  0801 12/17/17  0453 12/17/17  0032 12/16/17  2007  12/16/17  0505  12/15/17  0421  12/14/17  1600   * 152* 148* 149*  < > 150*  < > 152*  < > 148*   POTASSIUM 3.3* 3.3* 2.7* 3.5  < > 3.6  < > 3.2*  < > 3.4   CHLORIDE 113* 112* 108 109  < > 110*  < > 116*  < > 114*   CO2 33* 32 31 31  < > 36*  < > 27  < > 17*   ANIONGAP 7 8 10 9  < > 4  < > 9  < > 17*   * 217* 175* 176*  < > 167*  < > 192*  < > 142*   BUN 18 20 21 22  < > 20  < > 17  < > 18   CR 1.06* 1.15* 1.23* 1.19*  < > 1.18*  < > 1.51*  < > 1.43*   GFRESTIMATED 56* 51* 47* 49*  < > 49*  < > 37*  < > 39*   GFRESTBLACK 67 61 57* 59*  < > 59*  < > 45*  < > 48*   ISAURO 16.9* 14.8* 12.4* 10.6*  < > 13.7*  < > 17.1*  < > 11.1*   MAG 2.1 1.9 2.2 2.1  < > 2.5*  < > 1.6  < >  --    PHOS 6.0* 6.6* 6.1* 6.4*  < > 2.2*  < > 4.6*  < > 5.3*   PROTTOTAL  --  4.0*  --   --   --  3.7*  --  3.9*  --  4.4*   ALBUMIN  --  1.6*  --   --   --  1.4*  --  1.6*  --  2.0*   BILITOTAL  --  1.1  --   --   --  1.2   --  0.8  --  0.4   ALKPHOS  --  120  --   --   --  99  --  114  --  131   AST  --  95*  --   --   --  77*  --  73*  --  67*   ALT  --  40  --   --   --  30  --  43  --  64*   < > = values in this interval not displayed.  CBC    Recent Labs  Lab 12/17/17  0453 12/16/17  0505 12/15/17  0421 12/14/17  2150   WBC 19.0* 23.1* 35.3* 30.3*   RBC 2.54* 2.87* 3.74* 3.39*   HGB 7.6* 8.9* 11.7 10.3*   HCT 23.9* 27.4* 36.7 32.8*   MCV 94 96 98 97   MCH 29.9 31.0 31.3 30.4   MCHC 31.8 32.5 31.9 31.4*   RDW 18.0* 17.2* 16.7* 17.1*   * 90* 119* 152     INR    Recent Labs  Lab 12/16/17  0505 12/15/17  1614 12/15/17  1154 12/15/17  0801   INR 1.29* 1.42* 1.55* 1.52*     Arterial Blood Gas    Recent Labs  Lab 12/17/17  0801 12/17/17  0408 12/17/17  0032 12/16/17 2007   PH 7.44 7.45 7.43 7.38   PCO2 51* 46* 49* 55*   PO2 98 103 137* 48*   HCO3 34* 32* 32* 33*   O2PER 40 50.0 60.0 40.0       Hepatic Studies:   Recent Labs   Lab Test  12/17/17   0453  12/16/17   0505  12/15/17   0421   AST  95*  77*  73*   ALT  40  30  43   ALKPHOS  120  99  114   BILITOTAL  1.1  1.2  0.8   ALBUMIN  1.6*  1.4*  1.6*     Heme Studies:   Recent Labs   Lab Test  12/17/17   0453  12/16/17   0505  12/15/17   1614  12/15/17   1154   12/15/17   0421   WBC  19.0*  23.1*   --    --    --   35.3*   ANEU  17.4*  19.8*   --    --    --   32.1*   ALYM  0.5*  1.6   --    --    --   2.4   AEOS  0.0  0.0   --    --    --   0.0   HGB  7.6*  8.9*   --    --    --   11.7   MCV  94  96   --    --    --   98   PLT  101*  90*   --    --    --   119*   INR   --   1.29*  1.42*  1.55*   < >  1.42*    < > = values in this interval not displayed.     Iron Studies:   Recent Labs   Lab Test  03/12/17   0657   IRON  38   FEB  221*   IRONSAT  17     Urine Studies:   Recent Labs   Lab Test  12/15/17   0629  12/15/17   0511   SG  1.005  Canceled, Test credited   URINEPH  5.0  Canceled, Test credited   NITRITE  Negative  Canceled, Test credited*   LEUKEST  Negative  Canceled,  Test credited*   WBCU  <1  Canceled, Test credited*   RBCU  <1  Canceled, Test credited   PROTEIN  Negative  Canceled, Test credited*       Microbiology:  No results found for: RS, FLUAG    Recent Labs   Lab Test  12/15/17   0428  12/15/17   0145  12/13/17   1750  10/02/17   0103  03/09/17   1740  01/24/17   0622  10/11/16   0615  04/10/14   1430   CULT  No growth after 2 days  Canceled, Test credited  Test canceled by PCU/Clinic  PER RN GAYLE S     --   50,000 to 100,000 colonies/mL  Enterococcus faecalis  *  <10,000 colonies/mL  urogenital mina  Susceptibility testing not routinely done     --   50,000 to 100,000 colonies/mL Proteus mirabilis*  Canceled, Test credited Duplicate request   --   No Salmonella, Shigella, Campylobacter, E. coli O157, Aeromonas, or Plesiomonas   isolated.     SDES  Blood White port  Blood  Nares  Midstream Urine  Feces  Midstream Urine  Unspecified Urine  Feces  Feces       Imaging:  Recent Results (from the past 24 hour(s))   CT Abdomen Pelvis w Contrast    Narrative    EXAMINATION: CT ABDOMEN PELVIS W CONTRAST, 12/16/2017 11:53 AM    TECHNIQUE:  Helical CT images from the lung bases through the  symphysis pubis were obtained  with IV contrast. Contrast dose: 45cc  isovue 370    COMPARISON: CT abdomen pelvis with contrast 1/26/2017    HISTORY: Ileus in the setting of calcium channel blockade, rising  lactate; calcium channel blocker overdose.    FINDINGS:  Lung bases: Right greater than left moderate pleural effusions with  associated dependent atelectasis. Associated basilar geographic areas  of groundglass and mild interlobular septal thickening. Mildly nodular  opacities within the left lung base. No significant pericardial  effusion.    Abdomen/pelvis: Gastric tube within the gastric lumen. Evaluation  limited by poor IV contrast bolus. Mild perihepatic ascites. Ascites  extends along the bilateral paracolic gutters and into the pelvis.  Stranding and thickening of the  presacral region with diffuse  mesenteric and soft tissue edema. No evidence of gastric wall  thickening. Spleen is unremarkable. Adrenal glands are within normal  limits. Appearance of the kidneys are unremarkable. No hydronephrosis  or hydroureter. Bladder is partially decompressed by a Napoles catheter.  Mildly prominent bladder wall likely secondary to decompressed state.  There is nondependent gas within the bladder, likely iatrogenic.    Diffuse hyperdense material throughout the small bowel and colon  consistent with ingested materials. No dilated loops of bowel. No  findings suggestive of pneumatosis. No portal venous gas or late stage  evidence of ischemia. Uterus is unremarkable. Stable density adjacent  to the introitus (series 2, image 172), unchanged from 1/26/2017,  which may represent proteinaceous vaginal or Bartholin's gland cyst.  Not significantly changed.    Abdominal vasculature is patent and within normal limits. The portal  and hepatic veins are patent. The splenic vein is patent.    Asymmetric soft tissue edema of the left anterior thigh soft tissues  and fluid within the tracking along the anterior thigh muscle fascia.  No aggressive appearing osseous lesions.      Impression    IMPRESSION:   1. Recurrent left pleural effusions and patchy consolidative and  groundglass opacities of the lung bases, suggestive of pulmonary  edema, mildly nodular appearance predominantly the left lung base,  infection or aspiration remain within the differential.  2. Anasarca with asymmetric soft tissue edema and fluid along the  fascia of the left anterior thigh compartment.  3. Moderate abdominal ascites. No dilated loops bowel, pneumatosis,  portal venous gas or other late stage findings suggestive of bowel  ischemia.    I have personally reviewed the examination and initial interpretation  and I agree with the findings.    ESTEE ARCINIEGA MD   XR Chest Port 1 View    Narrative    Exam:  Chest X-ray  12/17/2017 6:45 AM    History: Intubated, pulmonary edema;     Comparison: Chest x-ray dated 12/16/2017    Findings: AP portable chest x-ray. The endotracheal tube tip in the  mid intrathoracic trachea. Right IJ central venous catheter with the  tip projecting over the low SVC. Partially visualized enteric tube  with the sidehole projecting over the stomach. The cardiac silhouette  is stable. Redemonstration of diffuse patchy airspace opacities.  Surgical changes of left upper lobe wedge resection. No pneumothorax.  The upper abdomen is unremarkable.      Impression    Impression: Mild decrease in diffuse airspace opacities, atypical  infection versus pulmonary edema.

## 2017-12-17 NOTE — PLAN OF CARE
Problem: Restraint for Non-Violent/Non-Self-Destructive Behavior  Goal: Prevent/Manage Potential Problems  Maintain safety of patient and others during period of restraint.  Promote psychological and physical wellbeing.  Prevent injury to skin and involved body parts.  Promote nutrition, hydration, and elimination.   Outcome: No Change  Pt requires bilateral soft-wrist restraints for safety d/t agitation w/nursing cares & concern for ETT/line removal.  Will continue to monitor necessity Q 2 hrs.

## 2017-12-17 NOTE — PLAN OF CARE
Problem: Restraint for Non-Violent/Non-Self-Destructive Behavior  Goal: Prevent/Manage Potential Problems  Maintain safety of patient and others during period of restraint.  Promote psychological and physical wellbeing.  Prevent injury to skin and involved body parts.  Promote nutrition, hydration, and elimination.   Outcome: No Change  Pt continues to require bilateral soft wrist restraints d/t intermittent agitation w/nursing cares.  Pt remains unredirectable to nursing commands and impulsive w/localization towards ETT/lines.  Will continue to monitor necessity Q 2 hrs.

## 2017-12-17 NOTE — PLAN OF CARE
Problem: Overdose, Ingestion/Inhalants (Adult)  Goal: Signs and Symptoms of Listed Potential Problems Will be Absent, Minimized or Managed (Overdose, Ingestion/Inhalants)  Signs and symptoms of listed potential problems will be absent, minimized or managed by discharge/transition of care (reference Overdose, Ingestion/Inhalants (Adult) CPG).   Outcome: Declining  Converted to SR after x2 doses of Metoprolol, long pause & HR dropped to 39 w/conversion.  No further episodes noted.  Atropine at bedside for emergencies.  Insulin & Calcium gtt weaned off, tolerated for ~1-1.5 hrs before dropping BPs (80's/40's w/MAPs 47-50).  Insulin, Ca, Levo gtt restarted this afternoon, titrating as ordered.  Low-grade temps, Tmax 100.8 axillary - Rocephin & Flagyl started.  Requiring more O2 to maintain sats > 88%.  Most recent vent settings - CMV, RR 32, Vt 550, P12, FiO2 60-70%.  SpO2 noticeably greater on monitor than PO2 on ABGs - per MD, please use SpO2 on monitor and maintain > 92%.  Minimal output from OG LIS.  Suppository & mineral oil enema given with no results.  BS remain absent.  UOP trending down while on Lasix gtt, gtt d/c.  Plan for net even since admission but acute decompensation this afternoon required 500 mL bolus of LR.  No family at bedside, no calls received for updates.  Will continue to monitor and notify MD regarding changes in pt's condition.

## 2017-12-18 ENCOUNTER — APPOINTMENT (OUTPATIENT)
Dept: GENERAL RADIOLOGY | Facility: CLINIC | Age: 47
DRG: 917 | End: 2017-12-18
Attending: INTERNAL MEDICINE
Payer: COMMERCIAL

## 2017-12-18 LAB
ALBUMIN SERPL-MCNC: 1.2 G/DL (ref 3.4–5)
ALP SERPL-CCNC: 124 U/L (ref 40–150)
ALT SERPL W P-5'-P-CCNC: 38 U/L (ref 0–50)
ANION GAP SERPL CALCULATED.3IONS-SCNC: 10 MMOL/L (ref 3–14)
ANION GAP SERPL CALCULATED.3IONS-SCNC: 4 MMOL/L (ref 3–14)
ANION GAP SERPL CALCULATED.3IONS-SCNC: 4 MMOL/L (ref 3–14)
ANION GAP SERPL CALCULATED.3IONS-SCNC: 5 MMOL/L (ref 3–14)
ANION GAP SERPL CALCULATED.3IONS-SCNC: 6 MMOL/L (ref 3–14)
ANION GAP SERPL CALCULATED.3IONS-SCNC: 7 MMOL/L (ref 3–14)
ANISOCYTOSIS BLD QL SMEAR: ABNORMAL
AST SERPL W P-5'-P-CCNC: 100 U/L (ref 0–45)
BASE DEFICIT BLDA-SCNC: 0.5 MMOL/L
BASE DEFICIT BLDA-SCNC: 0.7 MMOL/L
BASE EXCESS BLDA CALC-SCNC: 1.2 MMOL/L
BASE EXCESS BLDA CALC-SCNC: 1.8 MMOL/L
BASE EXCESS BLDA CALC-SCNC: 1.9 MMOL/L
BASE EXCESS BLDA CALC-SCNC: 2.2 MMOL/L
BASE EXCESS BLDA CALC-SCNC: 2.8 MMOL/L
BASE EXCESS BLDA CALC-SCNC: 3 MMOL/L
BASE EXCESS BLDA CALC-SCNC: 3.9 MMOL/L
BASOPHILS # BLD AUTO: 0 10E9/L (ref 0–0.2)
BASOPHILS NFR BLD AUTO: 0 %
BILIRUB DIRECT SERPL-MCNC: 1.2 MG/DL (ref 0–0.2)
BILIRUB SERPL-MCNC: 1.7 MG/DL (ref 0.2–1.3)
BUN SERPL-MCNC: 18 MG/DL (ref 7–30)
BUN SERPL-MCNC: 18 MG/DL (ref 7–30)
BUN SERPL-MCNC: 19 MG/DL (ref 7–30)
BUN SERPL-MCNC: 20 MG/DL (ref 7–30)
CA-I BLD-MCNC: 10.9 MG/DL (ref 4.4–5.2)
CA-I BLD-MCNC: 7.9 MG/DL (ref 4.4–5.2)
CA-I BLD-MCNC: 8.2 MG/DL (ref 4.4–5.2)
CA-I BLD-MCNC: 8.5 MG/DL (ref 4.4–5.2)
CA-I BLD-MCNC: 8.5 MG/DL (ref 4.4–5.2)
CA-I BLD-MCNC: 9.7 MG/DL (ref 4.4–5.2)
CA-I SERPL ISE-MCNC: 10.3 MG/DL (ref 4.4–5.2)
CA-I SERPL ISE-MCNC: 8.8 MG/DL (ref 4.4–5.2)
CALCIUM SERPL-MCNC: 10.7 MG/DL (ref 8.5–10.1)
CALCIUM SERPL-MCNC: 12.9 MG/DL (ref 8.5–10.1)
CALCIUM SERPL-MCNC: 13.7 MG/DL (ref 8.5–10.1)
CALCIUM SERPL-MCNC: 14 MG/DL (ref 8.5–10.1)
CALCIUM SERPL-MCNC: 14.4 MG/DL (ref 8.5–10.1)
CALCIUM SERPL-MCNC: 15.9 MG/DL (ref 8.5–10.1)
CHLORIDE SERPL-SCNC: 111 MMOL/L (ref 94–109)
CHLORIDE SERPL-SCNC: 112 MMOL/L (ref 94–109)
CHLORIDE SERPL-SCNC: 113 MMOL/L (ref 94–109)
CHLORIDE SERPL-SCNC: 114 MMOL/L (ref 94–109)
CHLORIDE SERPL-SCNC: 114 MMOL/L (ref 94–109)
CHLORIDE SERPL-SCNC: 115 MMOL/L (ref 94–109)
CO2 SERPL-SCNC: 28 MMOL/L (ref 20–32)
CO2 SERPL-SCNC: 28 MMOL/L (ref 20–32)
CO2 SERPL-SCNC: 30 MMOL/L (ref 20–32)
CO2 SERPL-SCNC: 31 MMOL/L (ref 20–32)
CO2 SERPL-SCNC: 32 MMOL/L (ref 20–32)
CO2 SERPL-SCNC: 33 MMOL/L (ref 20–32)
CREAT SERPL-MCNC: 1.23 MG/DL (ref 0.52–1.04)
CREAT SERPL-MCNC: 1.3 MG/DL (ref 0.52–1.04)
CREAT SERPL-MCNC: 1.31 MG/DL (ref 0.52–1.04)
CREAT SERPL-MCNC: 1.34 MG/DL (ref 0.52–1.04)
CREAT SERPL-MCNC: 1.39 MG/DL (ref 0.52–1.04)
CREAT SERPL-MCNC: 1.44 MG/DL (ref 0.52–1.04)
DIFFERENTIAL METHOD BLD: ABNORMAL
EOSINOPHIL # BLD AUTO: 0 10E9/L (ref 0–0.7)
EOSINOPHIL NFR BLD AUTO: 0 %
ERYTHROCYTE [DISTWIDTH] IN BLOOD BY AUTOMATED COUNT: 18.6 % (ref 10–15)
GFR SERPL CREATININE-BSD FRML MDRD: 39 ML/MIN/1.7M2
GFR SERPL CREATININE-BSD FRML MDRD: 41 ML/MIN/1.7M2
GFR SERPL CREATININE-BSD FRML MDRD: 42 ML/MIN/1.7M2
GFR SERPL CREATININE-BSD FRML MDRD: 43 ML/MIN/1.7M2
GFR SERPL CREATININE-BSD FRML MDRD: 44 ML/MIN/1.7M2
GFR SERPL CREATININE-BSD FRML MDRD: 47 ML/MIN/1.7M2
GLUCOSE BLDC GLUCOMTR-MCNC: 139 MG/DL (ref 70–99)
GLUCOSE BLDC GLUCOMTR-MCNC: 143 MG/DL (ref 70–99)
GLUCOSE BLDC GLUCOMTR-MCNC: 146 MG/DL (ref 70–99)
GLUCOSE BLDC GLUCOMTR-MCNC: 150 MG/DL (ref 70–99)
GLUCOSE BLDC GLUCOMTR-MCNC: 154 MG/DL (ref 70–99)
GLUCOSE BLDC GLUCOMTR-MCNC: 156 MG/DL (ref 70–99)
GLUCOSE BLDC GLUCOMTR-MCNC: 156 MG/DL (ref 70–99)
GLUCOSE BLDC GLUCOMTR-MCNC: 157 MG/DL (ref 70–99)
GLUCOSE BLDC GLUCOMTR-MCNC: 157 MG/DL (ref 70–99)
GLUCOSE BLDC GLUCOMTR-MCNC: 158 MG/DL (ref 70–99)
GLUCOSE BLDC GLUCOMTR-MCNC: 159 MG/DL (ref 70–99)
GLUCOSE BLDC GLUCOMTR-MCNC: 160 MG/DL (ref 70–99)
GLUCOSE BLDC GLUCOMTR-MCNC: 161 MG/DL (ref 70–99)
GLUCOSE BLDC GLUCOMTR-MCNC: 162 MG/DL (ref 70–99)
GLUCOSE BLDC GLUCOMTR-MCNC: 163 MG/DL (ref 70–99)
GLUCOSE BLDC GLUCOMTR-MCNC: 166 MG/DL (ref 70–99)
GLUCOSE BLDC GLUCOMTR-MCNC: 168 MG/DL (ref 70–99)
GLUCOSE BLDC GLUCOMTR-MCNC: 169 MG/DL (ref 70–99)
GLUCOSE BLDC GLUCOMTR-MCNC: 170 MG/DL (ref 70–99)
GLUCOSE BLDC GLUCOMTR-MCNC: 171 MG/DL (ref 70–99)
GLUCOSE BLDC GLUCOMTR-MCNC: 172 MG/DL (ref 70–99)
GLUCOSE BLDC GLUCOMTR-MCNC: 174 MG/DL (ref 70–99)
GLUCOSE BLDC GLUCOMTR-MCNC: 175 MG/DL (ref 70–99)
GLUCOSE BLDC GLUCOMTR-MCNC: 178 MG/DL (ref 70–99)
GLUCOSE BLDC GLUCOMTR-MCNC: 185 MG/DL (ref 70–99)
GLUCOSE BLDC GLUCOMTR-MCNC: 186 MG/DL (ref 70–99)
GLUCOSE BLDC GLUCOMTR-MCNC: 196 MG/DL (ref 70–99)
GLUCOSE BLDC GLUCOMTR-MCNC: 199 MG/DL (ref 70–99)
GLUCOSE BLDC GLUCOMTR-MCNC: 199 MG/DL (ref 70–99)
GLUCOSE BLDC GLUCOMTR-MCNC: 204 MG/DL (ref 70–99)
GLUCOSE BLDC GLUCOMTR-MCNC: 206 MG/DL (ref 70–99)
GLUCOSE SERPL-MCNC: 153 MG/DL (ref 70–99)
GLUCOSE SERPL-MCNC: 162 MG/DL (ref 70–99)
GLUCOSE SERPL-MCNC: 184 MG/DL (ref 70–99)
GLUCOSE SERPL-MCNC: 185 MG/DL (ref 70–99)
GLUCOSE SERPL-MCNC: 191 MG/DL (ref 70–99)
GLUCOSE SERPL-MCNC: 192 MG/DL (ref 70–99)
HCO3 BLD-SCNC: 29 MMOL/L (ref 21–28)
HCO3 BLD-SCNC: 30 MMOL/L (ref 21–28)
HCO3 BLD-SCNC: 30 MMOL/L (ref 21–28)
HCO3 BLD-SCNC: 31 MMOL/L (ref 21–28)
HCT VFR BLD AUTO: 24.7 % (ref 35–47)
HGB BLD-MCNC: 7.7 G/DL (ref 11.7–15.7)
INR PPP: 1.62 (ref 0.86–1.14)
INTERPRETATION ECG - MUSE: NORMAL
INTERPRETATION ECG - MUSE: NORMAL
LACTATE BLD-SCNC: 2.3 MMOL/L (ref 0.7–2)
LACTATE BLD-SCNC: 3.6 MMOL/L (ref 0.7–2)
LACTATE BLD-SCNC: 4 MMOL/L (ref 0.7–2)
LACTATE BLD-SCNC: 4 MMOL/L (ref 0.7–2)
LYMPHOCYTES # BLD AUTO: 1 10E9/L (ref 0.8–5.3)
LYMPHOCYTES NFR BLD AUTO: 4.4 %
MACROCYTES BLD QL SMEAR: PRESENT
MAGNESIUM SERPL-MCNC: 2 MG/DL (ref 1.6–2.3)
MAGNESIUM SERPL-MCNC: 2 MG/DL (ref 1.6–2.3)
MAGNESIUM SERPL-MCNC: 2.1 MG/DL (ref 1.6–2.3)
MAGNESIUM SERPL-MCNC: 2.2 MG/DL (ref 1.6–2.3)
MAGNESIUM SERPL-MCNC: 2.2 MG/DL (ref 1.6–2.3)
MAGNESIUM SERPL-MCNC: 2.3 MG/DL (ref 1.6–2.3)
MCH RBC QN AUTO: 31 PG (ref 26.5–33)
MCHC RBC AUTO-ENTMCNC: 31.2 G/DL (ref 31.5–36.5)
MCV RBC AUTO: 100 FL (ref 78–100)
METAMYELOCYTES # BLD: 0.2 10E9/L
METAMYELOCYTES NFR BLD MANUAL: 0.9 %
MONOCYTES # BLD AUTO: 0.2 10E9/L (ref 0–1.3)
MONOCYTES NFR BLD AUTO: 0.9 %
MYELOCYTES # BLD: 0.4 10E9/L
MYELOCYTES NFR BLD MANUAL: 1.8 %
NEUTROPHILS # BLD AUTO: 20.3 10E9/L (ref 1.6–8.3)
NEUTROPHILS NFR BLD AUTO: 86.7 %
NRBC # BLD AUTO: 2.3 10*3/UL
NRBC BLD AUTO-RTO: 10 /100
O2/TOTAL GAS SETTING VFR VENT: 60 %
O2/TOTAL GAS SETTING VFR VENT: 80 %
O2/TOTAL GAS SETTING VFR VENT: 90 %
O2/TOTAL GAS SETTING VFR VENT: ABNORMAL %
PCO2 BLD: 64 MM HG (ref 35–45)
PCO2 BLD: 66 MM HG (ref 35–45)
PCO2 BLD: 67 MM HG (ref 35–45)
PCO2 BLD: 67 MM HG (ref 35–45)
PCO2 BLD: 68 MM HG (ref 35–45)
PCO2 BLD: 71 MM HG (ref 35–45)
PCO2 BLD: 77 MM HG (ref 35–45)
PCO2 BLD: 83 MM HG (ref 35–45)
PCO2 BLD: 92 MM HG (ref 35–45)
PH BLD: 7.11 PH (ref 7.35–7.45)
PH BLD: 7.14 PH (ref 7.35–7.45)
PH BLD: 7.22 PH (ref 7.35–7.45)
PH BLD: 7.22 PH (ref 7.35–7.45)
PH BLD: 7.25 PH (ref 7.35–7.45)
PH BLD: 7.26 PH (ref 7.35–7.45)
PH BLD: 7.26 PH (ref 7.35–7.45)
PH BLD: 7.27 PH (ref 7.35–7.45)
PH BLD: 7.28 PH (ref 7.35–7.45)
PHOSPHATE SERPL-MCNC: 1.5 MG/DL (ref 2.5–4.5)
PHOSPHATE SERPL-MCNC: 1.9 MG/DL (ref 2.5–4.5)
PHOSPHATE SERPL-MCNC: 4.2 MG/DL (ref 2.5–4.5)
PHOSPHATE SERPL-MCNC: 4.2 MG/DL (ref 2.5–4.5)
PHOSPHATE SERPL-MCNC: 4.4 MG/DL (ref 2.5–4.5)
PHOSPHATE SERPL-MCNC: 5.4 MG/DL (ref 2.5–4.5)
PLATELET # BLD AUTO: 77 10E9/L (ref 150–450)
PLATELET # BLD EST: ABNORMAL 10*3/UL
PO2 BLD: 101 MM HG (ref 80–105)
PO2 BLD: 103 MM HG (ref 80–105)
PO2 BLD: 73 MM HG (ref 80–105)
PO2 BLD: 74 MM HG (ref 80–105)
PO2 BLD: 75 MM HG (ref 80–105)
PO2 BLD: 83 MM HG (ref 80–105)
PO2 BLD: 85 MM HG (ref 80–105)
PO2 BLD: 89 MM HG (ref 80–105)
PO2 BLD: 99 MM HG (ref 80–105)
POLYCHROMASIA BLD QL SMEAR: SLIGHT
POTASSIUM SERPL-SCNC: 3.1 MMOL/L (ref 3.4–5.3)
POTASSIUM SERPL-SCNC: 3.2 MMOL/L (ref 3.4–5.3)
POTASSIUM SERPL-SCNC: 3.5 MMOL/L (ref 3.4–5.3)
POTASSIUM SERPL-SCNC: 4.2 MMOL/L (ref 3.4–5.3)
POTASSIUM SERPL-SCNC: 4.2 MMOL/L (ref 3.4–5.3)
POTASSIUM SERPL-SCNC: 4.8 MMOL/L (ref 3.4–5.3)
POTASSIUM SERPL-SCNC: 5.6 MMOL/L (ref 3.4–5.3)
PROMYELOCYTES # BLD MANUAL: 1.2 10E9/L
PROMYELOCYTES NFR BLD MANUAL: 5.3 %
PROT SERPL-MCNC: 3.5 G/DL (ref 6.8–8.8)
RBC # BLD AUTO: 2.48 10E12/L (ref 3.8–5.2)
SODIUM SERPL-SCNC: 148 MMOL/L (ref 133–144)
SODIUM SERPL-SCNC: 148 MMOL/L (ref 133–144)
SODIUM SERPL-SCNC: 150 MMOL/L (ref 133–144)
SODIUM SERPL-SCNC: 152 MMOL/L (ref 133–144)
WBC # BLD AUTO: 23.4 10E9/L (ref 4–11)

## 2017-12-18 PROCEDURE — 71010 XR CHEST PORT 1 VW: CPT

## 2017-12-18 PROCEDURE — 83605 ASSAY OF LACTIC ACID: CPT | Performed by: INTERNAL MEDICINE

## 2017-12-18 PROCEDURE — 25000128 H RX IP 250 OP 636: Performed by: INTERNAL MEDICINE

## 2017-12-18 PROCEDURE — 25000128 H RX IP 250 OP 636: Performed by: STUDENT IN AN ORGANIZED HEALTH CARE EDUCATION/TRAINING PROGRAM

## 2017-12-18 PROCEDURE — 93005 ELECTROCARDIOGRAM TRACING: CPT

## 2017-12-18 PROCEDURE — 80048 BASIC METABOLIC PNL TOTAL CA: CPT | Performed by: INTERNAL MEDICINE

## 2017-12-18 PROCEDURE — 94645 CONT INHLJ TX EACH ADDL HOUR: CPT

## 2017-12-18 PROCEDURE — 20000004 ZZH R&B ICU UMMC

## 2017-12-18 PROCEDURE — 83735 ASSAY OF MAGNESIUM: CPT | Performed by: INTERNAL MEDICINE

## 2017-12-18 PROCEDURE — 25000132 ZZH RX MED GY IP 250 OP 250 PS 637: Performed by: INTERNAL MEDICINE

## 2017-12-18 PROCEDURE — 40000014 ZZH STATISTIC ARTERIAL MONITORING DAILY

## 2017-12-18 PROCEDURE — 99291 CRITICAL CARE FIRST HOUR: CPT | Mod: GC | Performed by: INTERNAL MEDICINE

## 2017-12-18 PROCEDURE — 94640 AIRWAY INHALATION TREATMENT: CPT

## 2017-12-18 PROCEDURE — 82330 ASSAY OF CALCIUM: CPT | Performed by: INTERNAL MEDICINE

## 2017-12-18 PROCEDURE — 82803 BLOOD GASES ANY COMBINATION: CPT | Performed by: INTERNAL MEDICINE

## 2017-12-18 PROCEDURE — 25000125 ZZHC RX 250: Performed by: INTERNAL MEDICINE

## 2017-12-18 PROCEDURE — 93010 ELECTROCARDIOGRAM REPORT: CPT | Mod: 76 | Performed by: INTERNAL MEDICINE

## 2017-12-18 PROCEDURE — 27210995 ZZH RX 272: Performed by: INTERNAL MEDICINE

## 2017-12-18 PROCEDURE — 00000146 ZZHCL STATISTIC GLUCOSE BY METER IP

## 2017-12-18 PROCEDURE — 84132 ASSAY OF SERUM POTASSIUM: CPT | Performed by: STUDENT IN AN ORGANIZED HEALTH CARE EDUCATION/TRAINING PROGRAM

## 2017-12-18 PROCEDURE — P9047 ALBUMIN (HUMAN), 25%, 50ML: HCPCS | Performed by: INTERNAL MEDICINE

## 2017-12-18 PROCEDURE — 25000125 ZZHC RX 250: Performed by: STUDENT IN AN ORGANIZED HEALTH CARE EDUCATION/TRAINING PROGRAM

## 2017-12-18 PROCEDURE — 94644 CONT INHLJ TX 1ST HOUR: CPT

## 2017-12-18 PROCEDURE — 40000275 ZZH STATISTIC RCP TIME EA 10 MIN

## 2017-12-18 PROCEDURE — 25000131 ZZH RX MED GY IP 250 OP 636 PS 637: Performed by: INTERNAL MEDICINE

## 2017-12-18 PROCEDURE — 27210995 ZZH RX 272: Performed by: STUDENT IN AN ORGANIZED HEALTH CARE EDUCATION/TRAINING PROGRAM

## 2017-12-18 PROCEDURE — 94003 VENT MGMT INPAT SUBQ DAY: CPT

## 2017-12-18 PROCEDURE — 84100 ASSAY OF PHOSPHORUS: CPT | Performed by: INTERNAL MEDICINE

## 2017-12-18 PROCEDURE — 40000141 ZZH STATISTIC PERIPHERAL IV START W/O US GUIDANCE

## 2017-12-18 PROCEDURE — 25800025 ZZH RX 258: Performed by: STUDENT IN AN ORGANIZED HEALTH CARE EDUCATION/TRAINING PROGRAM

## 2017-12-18 PROCEDURE — 82330 ASSAY OF CALCIUM: CPT | Performed by: STUDENT IN AN ORGANIZED HEALTH CARE EDUCATION/TRAINING PROGRAM

## 2017-12-18 RX ORDER — ALBUMIN (HUMAN) 12.5 G/50ML
12.5 SOLUTION INTRAVENOUS EVERY 8 HOURS
Status: DISCONTINUED | OUTPATIENT
Start: 2017-12-18 | End: 2017-12-18

## 2017-12-18 RX ORDER — ALBUMIN (HUMAN) 12.5 G/50ML
25 SOLUTION INTRAVENOUS EVERY 8 HOURS
Status: COMPLETED | OUTPATIENT
Start: 2017-12-18 | End: 2017-12-19

## 2017-12-18 RX ORDER — MIDAZOLAM (PF) 1 MG/ML IN 0.9 % SODIUM CHLORIDE INTRAVENOUS SOLUTION
1-10 CONTINUOUS
Status: DISCONTINUED | OUTPATIENT
Start: 2017-12-18 | End: 2017-12-23

## 2017-12-18 RX ORDER — IPRATROPIUM BROMIDE AND ALBUTEROL SULFATE 2.5; .5 MG/3ML; MG/3ML
3 SOLUTION RESPIRATORY (INHALATION)
Status: DISCONTINUED | OUTPATIENT
Start: 2017-12-18 | End: 2017-12-22

## 2017-12-18 RX ORDER — METHYLPREDNISOLONE SODIUM SUCCINATE 40 MG/ML
40 INJECTION, POWDER, LYOPHILIZED, FOR SOLUTION INTRAMUSCULAR; INTRAVENOUS EVERY 8 HOURS
Status: DISCONTINUED | OUTPATIENT
Start: 2017-12-18 | End: 2017-12-24

## 2017-12-18 RX ADMIN — SODIUM CHLORIDE 10 UNITS/KG/HR: 0.9 INJECTION, SOLUTION INTRAVENOUS at 03:41

## 2017-12-18 RX ADMIN — MIDAZOLAM 2 MG: 1 INJECTION INTRAMUSCULAR; INTRAVENOUS at 14:47

## 2017-12-18 RX ADMIN — MINERAL OIL AND WHITE PETROLATUM: 150; 830 OINTMENT OPHTHALMIC at 05:58

## 2017-12-18 RX ADMIN — MINERAL OIL AND WHITE PETROLATUM: 150; 830 OINTMENT OPHTHALMIC at 13:57

## 2017-12-18 RX ADMIN — DEXTROSE MONOHYDRATE 3 MCG/KG/MIN: 5 INJECTION, SOLUTION INTRAVENOUS at 13:37

## 2017-12-18 RX ADMIN — Medication 10 MG: at 11:13

## 2017-12-18 RX ADMIN — DEXTROSE MONOHYDRATE: 70 INJECTION, SOLUTION INTRAVENOUS at 00:28

## 2017-12-18 RX ADMIN — Medication 10 MG/HR: at 00:23

## 2017-12-18 RX ADMIN — SODIUM CHLORIDE 10 UNITS/KG/HR: 0.9 INJECTION, SOLUTION INTRAVENOUS at 12:01

## 2017-12-18 RX ADMIN — Medication 10 MG/HR: at 10:15

## 2017-12-18 RX ADMIN — HYDROCORTISONE SODIUM SUCCINATE 100 MG: 100 INJECTION, POWDER, FOR SOLUTION INTRAMUSCULAR; INTRAVENOUS at 13:44

## 2017-12-18 RX ADMIN — SODIUM CHLORIDE 7 UNITS/KG/HR: 0.9 INJECTION, SOLUTION INTRAVENOUS at 21:20

## 2017-12-18 RX ADMIN — MIDAZOLAM 2 MG: 1 INJECTION INTRAMUSCULAR; INTRAVENOUS at 11:30

## 2017-12-18 RX ADMIN — THIAMINE HYDROCHLORIDE 100 MG: 100 INJECTION, SOLUTION INTRAMUSCULAR; INTRAVENOUS at 08:01

## 2017-12-18 RX ADMIN — BISACODYL 10 MG: 10 SUPPOSITORY RECTAL at 19:41

## 2017-12-18 RX ADMIN — ALBUMIN (HUMAN) 25 G: 12.5 SOLUTION INTRAVENOUS at 19:43

## 2017-12-18 RX ADMIN — MIDAZOLAM 2 MG: 1 INJECTION INTRAMUSCULAR; INTRAVENOUS at 14:36

## 2017-12-18 RX ADMIN — ENOXAPARIN SODIUM 40 MG: 40 INJECTION SUBCUTANEOUS at 13:42

## 2017-12-18 RX ADMIN — MINERAL OIL AND WHITE PETROLATUM: 150; 830 OINTMENT OPHTHALMIC at 22:44

## 2017-12-18 RX ADMIN — DEXTROSE MONOHYDRATE: 70 INJECTION, SOLUTION INTRAVENOUS at 09:51

## 2017-12-18 RX ADMIN — Medication 3 MG: at 13:57

## 2017-12-18 RX ADMIN — METHYLPREDNISOLONE 40 MG: 40 INJECTION, POWDER, LYOPHILIZED, FOR SOLUTION INTRAMUSCULAR; INTRAVENOUS at 16:34

## 2017-12-18 RX ADMIN — Medication 150 MCG/HR: at 13:59

## 2017-12-18 RX ADMIN — Medication 3 MG: at 19:42

## 2017-12-18 RX ADMIN — MAGNESIUM SULFATE IN DEXTROSE 1 G: 10 INJECTION, SOLUTION INTRAVENOUS at 01:27

## 2017-12-18 RX ADMIN — IPRATROPIUM BROMIDE AND ALBUTEROL SULFATE 3 ML: .5; 3 SOLUTION RESPIRATORY (INHALATION) at 21:00

## 2017-12-18 RX ADMIN — PROPOFOL 20 MCG/KG/MIN: 10 INJECTION, EMULSION INTRAVENOUS at 05:59

## 2017-12-18 RX ADMIN — EPOPROSTENOL 20 NG/KG/MIN: 1.5 INJECTION, POWDER, LYOPHILIZED, FOR SOLUTION INTRAVENOUS at 15:24

## 2017-12-18 RX ADMIN — POTASSIUM PHOSPHATE, MONOBASIC AND POTASSIUM PHOSPHATE, DIBASIC 20 MMOL: 224; 236 INJECTION, SOLUTION INTRAVENOUS at 21:06

## 2017-12-18 RX ADMIN — DEXTROSE MONOHYDRATE: 70 INJECTION, SOLUTION INTRAVENOUS at 14:13

## 2017-12-18 RX ADMIN — ALBUTEROL SULFATE 2.5 MG: 2.5 SOLUTION RESPIRATORY (INHALATION) at 03:47

## 2017-12-18 RX ADMIN — CALCIUM CHLORIDE 0.5 G/HR: 100 INJECTION, SOLUTION INTRAVENOUS at 00:23

## 2017-12-18 RX ADMIN — POTASSIUM CHLORIDE 20 MEQ: 400 INJECTION, SOLUTION INTRAVENOUS at 18:26

## 2017-12-18 RX ADMIN — BISACODYL 10 MG: 10 SUPPOSITORY RECTAL at 08:25

## 2017-12-18 RX ADMIN — HYDROCORTISONE SODIUM SUCCINATE 100 MG: 100 INJECTION, POWDER, FOR SOLUTION INTRAMUSCULAR; INTRAVENOUS at 05:58

## 2017-12-18 RX ADMIN — Medication 3 MG: at 10:29

## 2017-12-18 RX ADMIN — CEFTRIAXONE 2 G: 10 INJECTION, POWDER, FOR SOLUTION INTRAVENOUS at 13:42

## 2017-12-18 RX ADMIN — PROPOFOL 20 MCG/KG/MIN: 10 INJECTION, EMULSION INTRAVENOUS at 16:41

## 2017-12-18 RX ADMIN — FUROSEMIDE 10 MG/HR: 10 INJECTION, SOLUTION INTRAVENOUS at 18:15

## 2017-12-18 RX ADMIN — IPRATROPIUM BROMIDE AND ALBUTEROL SULFATE 3 ML: .5; 3 SOLUTION RESPIRATORY (INHALATION) at 12:15

## 2017-12-18 RX ADMIN — CALCIUM CHLORIDE 1.5 G/HR: 100 INJECTION, SOLUTION INTRAVENOUS at 08:04

## 2017-12-18 RX ADMIN — IPRATROPIUM BROMIDE AND ALBUTEROL SULFATE 3 ML: .5; 3 SOLUTION RESPIRATORY (INHALATION) at 17:08

## 2017-12-18 RX ADMIN — SODIUM CHLORIDE 7 UNITS/KG/HR: 0.9 INJECTION, SOLUTION INTRAVENOUS at 19:42

## 2017-12-18 RX ADMIN — THIAMINE HYDROCHLORIDE 100 MG: 100 INJECTION, SOLUTION INTRAMUSCULAR; INTRAVENOUS at 19:42

## 2017-12-18 RX ADMIN — Medication 10 MG: at 11:11

## 2017-12-18 RX ADMIN — METRONIDAZOLE 500 MG: 500 INJECTION, SOLUTION INTRAVENOUS at 07:55

## 2017-12-18 RX ADMIN — METRONIDAZOLE 500 MG: 500 INJECTION, SOLUTION INTRAVENOUS at 02:18

## 2017-12-18 RX ADMIN — MAGNESIUM SULFATE IN DEXTROSE 1 G: 10 INJECTION, SOLUTION INTRAVENOUS at 16:46

## 2017-12-18 RX ADMIN — Medication 150 MCG/HR: at 04:06

## 2017-12-18 RX ADMIN — FUROSEMIDE 10 MG/HR: 10 INJECTION, SOLUTION INTRAVENOUS at 09:08

## 2017-12-18 RX ADMIN — Medication 10 MG/HR: at 18:15

## 2017-12-18 RX ADMIN — EPOPROSTENOL 20 NG/KG/MIN: 1.5 INJECTION, POWDER, LYOPHILIZED, FOR SOLUTION INTRAVENOUS at 10:41

## 2017-12-18 RX ADMIN — POTASSIUM CHLORIDE 20 MEQ: 400 INJECTION, SOLUTION INTRAVENOUS at 17:21

## 2017-12-18 RX ADMIN — EPOPROSTENOL 20 NG/KG/MIN: 1.5 INJECTION, POWDER, LYOPHILIZED, FOR SOLUTION INTRAVENOUS at 20:58

## 2017-12-18 RX ADMIN — MIDAZOLAM 2 MG: 1 INJECTION INTRAMUSCULAR; INTRAVENOUS at 11:24

## 2017-12-18 RX ADMIN — FOLIC ACID 1 MG: 5 INJECTION, SOLUTION INTRAMUSCULAR; INTRAVENOUS; SUBCUTANEOUS at 08:01

## 2017-12-18 RX ADMIN — METHYLPREDNISOLONE 40 MG: 40 INJECTION, POWDER, LYOPHILIZED, FOR SOLUTION INTRAMUSCULAR; INTRAVENOUS at 22:45

## 2017-12-18 RX ADMIN — LEVOTHYROXINE SODIUM ANHYDROUS 25 MCG: 100 INJECTION, POWDER, LYOPHILIZED, FOR SOLUTION INTRAVENOUS at 09:32

## 2017-12-18 RX ADMIN — PANTOPRAZOLE SODIUM 40 MG: 40 INJECTION, POWDER, FOR SOLUTION INTRAVENOUS at 07:55

## 2017-12-18 RX ADMIN — ALBUMIN (HUMAN) 25 G: 12.5 SOLUTION INTRAVENOUS at 10:01

## 2017-12-18 RX ADMIN — DEXTROSE MONOHYDRATE: 70 INJECTION, SOLUTION INTRAVENOUS at 18:16

## 2017-12-18 NOTE — PLAN OF CARE
Problem: Patient Care Overview  Goal: Plan of Care/Patient Progress Review  Outcome: Declining  D: Calcium channel blocker overdose  I/A: Pt currently paralyzed and sedated (Versed, Fentanyl, Propofol, Cis), having increased PIP 40s; RASS -5, 1/4 twitches on TOF. HR 100s, occass PVCs, low grade temp 100.3, MAP >60 (Levo and insulin). Vent: % FiO2/8-12 Peep/550 TV/32 RR. Min secretions. Good UOP- Lasix gtt restarted,1 BM- absent bowel sounds. OG at low int suction. Lytes replaced per protocol.   P: Cont POC, titrate drips as able, monitor q4 hr labs.

## 2017-12-18 NOTE — PROGRESS NOTES
Medical ICU Progress Note    Patient:  Ana Laura Wharton   Date of birth 1970, Medical record number [id number]  Date of Visit:  12/18/2017  Date of Admission: 12/13/2017    Assessment and Plan:    Ana Laura Wharton is a 48 yo female with hx of bipolar disorder with depression, polysubstance abuse, & prior suicide attempts, admitted to [facility] MICU  Following calcium channel blocker (CCB) overdose, tylenol overdose & alcohol overdose. Course complicated by lactic acidosis, hypoventilation, acute renal failure, & severe hypotension/vasogenic shock. Currently intubated & sedated.      NEURO  Sedation/Analgesia:  Midazolam gtt + additional PRN  Fentanyl gtt + additional PRN  Propofol gtt as needed if pressures tolerate     Paralytic: cisatracurium gtt d/c 12/16     Bipolar I disorder with anxiety & depression  Suicide attempt with polysubstance abuse (amlodipine, nyquil (acetaminophen), alcohol (vodka))  - Manage OD as per below  - Poison control recs: ical @10, will d/c calcium drip and trial off calcium, monitoring BP  - Will monitor for withdrawal symptoms once weaned from sedation (ETOH 0.17 at OSH). IV thiamine & folate  - Holding home zyprexa & remeron. If agitated, will increase versed  - Psych consult for OD when awake     Agitation: Patient at high risk of alcohol & benzo withdrawal  - Slow wean of sedation when medically appropriate     RESPIRATORY  Vent Settings:  Ventilation Mode: CMV/AC  FiO2 (%): 40 %  Rate Set (breaths/minute): 32 breaths/min  Tidal Volume Set (mL): 400 mL  PEEP (cm H2O): 10 cmH2O  Oxygen Concentration (%): 50 %     ARDS 2/2 likely aspiration   Acute Hypoxic  Respiratory Failure:  Acute Hypercarbic Respiratory Failure  Due initially to hypoventilation due to overdose, now due to V/Q mismatch in the setting of ARDS. ARDS net vent settings for lung protection   - Prone and paralyze today for lung recruitment  - Inhaled Flolan for increased oxygenation   - Vent settings as per above  - Titrate  O2 to sats >90%, q4 ABG  - Solumedrol in the setting of recurrent hypercarbia and concern for air trapping in the setting of possible COPD.   - Duonebs for air trapping     CARDIOVASCULAR  Hypotension/Vasodilatory shock/Vasoplegia: Requiring pressors. Side effect of CCB overdose. ECHO shows normal EF of 70%. Normal RV function.  - Norepi titrated currently off today  - Albumin 25% today   - D50/Insulin gtt 10U/kg/hr. Will start to wean by 1U/kg/hr over the course of today  - Holding Calcium chloride gtt as pressures have normalized, iCal goal previously 10. Will slowly wean Calcium by 0.5g/hr now that normotensive  - Bicarbonate gtt discontinued as pH >7.25  - Continue stress dose steroids until off pressors, insulin & calcium     Bradycardia, resolved: Initially presented with bradycardia. Insulin gtt & dextrose  - Continue D20/Insulin gtt, will wean once off of pressors  - Management of hypotension as per above     Paroxysmal atrial fibrillation: Briefly in Afib with RVR this AM. Received metoprolol x 2. Converted to NSR. Will hopefully improve once fluid status improves  - Metoprolol IV PRN  - Would start amio load if persistent with Rates >120    Intermittent SVT  Patient has been in SVT with rates in the 190s intermittently and spontaneously resolves. Hemodynamically stable during episodes of SVT  - Diltiazem gtt for SVT     RENAL / FLUIDS / ELECTROLYTES  Baseline creatinine: 0.7-0.8.      Acute Renal Failure: As high as 1.6. Now 1.06. Due to CCB overdose. Volume overload, pulmonary edema. Cr slowly improving with diuresis & forward flow  - Lasix gtt/intermittent lasix. Goal of fluid even for this hospitalization  - I/Os, Monitor Cr, UOP  - Monitoring lytes, replace     Lactic Acidosis/AGMA: Due to hypoperfusion. Lactate may continue to be elevated until after CCB has cleared  - Continue to trend  - Goal of pH>7.25, bicarb gtt as necessary     GASTROINTESTINAL  MELD-Na score: 11 at 12/17/2017  8:01 AM  MELD  score: 11 at 2017  8:01 AM  Calculated from:  Serum Creatinine: 1.06 mg/dL at 2017  8:01 AM  Serum Sodium: 154 mmol/L (Rounded to 137) at 2017  8:01 AM  Total Bilirubin: 1.1 mg/dL at 2017  4:53 AM  INR(ratio): 1.29 at 2017  5:05 AM  Age: 47 years     Acetaminophen overdose  Elevated Transaminases  Found next to bottle of Nyquil. AST/ALTs now normal s/p NAC gtt.   - Trend LFTs, INR     OG: OG to LIS     Hx of C.diff: Hx of fecal transplant. Has had formed stool     Tube Feeds: Re-evaluate daily     INFECTIOUS DISEASE     Aspiration Pneumonia:   Patient has evidence of aspiration in lower lungs and is febrile. Patient is allergic to amoxicillin-clavulanic acid.   - Initiated ceftriaxone and metronidazole for aspiration pneumonia  - Metronidazole d/c'ed      Cultures:  UA  negative  Blood Cx  NGTD     Antibiotics:  Vancomycin (->)  Cefepime (->)     HEMATOLOGY   Anemia: Normocytic Anemia. Likely due to initial dilution & chronic illness  - Monitor daily  - Tranfuse < 7     Thrombocytopenia: Unknown etiology. Does not appear to be HIT related. Stable at 100  - Monitor daily     INR: As high as 1.55 in the setting of overdose & liver injury. 1.29 today  - Monitor daily     ENDOCRINE  On insulin/D50 for CBB overdose, monitoring sugars q1     Hypothyroidism: Levothyroxine IV, switch back to oral once able     SKIN CARE  No acute concerns     Prophylaxis: DVT: Lovenox: GI: PPI  Code status: Full  Lines: RIJ, RAL, PIV x3    Patient seen and discussed with Dr. Rogers who agrees with the assessment and plan    Raymon Pedro MD  PGY2  [phone]      Interval Events    Patient with poor ventilatory capacity, patient prone.     Objective    Temperatures:  Current - Temp: 99.3  F (37.4  C); Max - Temp  Av.1  F (37.8  C)  Min: 98.8  F (37.1  C)  Max: 100.9  F (38.3  C)  Respiration range: Resp  Av.3  Min: 27  Max: 35  Pulse range: Heart Rate  Av.8   Min: 90  Max: 112  Blood pressure range: No data recorded.  ; No data recorded.    Pulse oximetry range: SpO2  Av.6 %  Min: 88 %  Max: 99 %    Ventilation Mode: CMV/AC  FiO2 (%): 90 %  Rate Set (breaths/minute): 32 breaths/min  Tidal Volume Set (mL): 550 mL  PEEP (cm H2O): 8 cmH2O  Oxygen Concentration (%): 70 %  Resp: 32        Physical Exam    ROUTINE ICU LABS (Last four results)  CMP  Recent Labs  Lab 17  0810 17  0352 17  0017 17  1952  17  0453  17  0505  12/15/17  0421   * 152* 150* 148*  < > 152*  < > 150*  < > 152*   POTASSIUM 4.2 4.8 5.6* 5.3  < > 3.3*  < > 3.6  < > 3.2*   CHLORIDE 114* 115* 114* 115*  < > 112*  < > 110*  < > 116*   CO2 30 33* 32 29  < > 32  < > 36*  < > 27   ANIONGAP 5 4 4 5  < > 8  < > 4  < > 9   * 185* 184* 174*  < > 217*  < > 167*  < > 192*   BUN 18 19 20 19  < > 20  < > 20  < > 17   CR 1.31* 1.23* 1.34* 1.24*  < > 1.15*  < > 1.18*  < > 1.51*   GFRESTIMATED 43* 47* 42* 46*  < > 51*  < > 49*  < > 37*   GFRESTBLACK 53* 57* 51* 56*  < > 61  < > 59*  < > 45*   ISAURO 15.9* 14.4* 13.7* 14.7*  < > 14.8*  < > 13.7*  < > 17.1*   MAG 2.2 2.3 2.2 2.3  < > 1.9  < > 2.5*  < > 1.6   PHOS 4.2 4.2 5.4* 4.2  < > 6.6*  < > 2.2*  < > 4.6*   PROTTOTAL  --  3.5*  --   --   --  4.0*  --  3.7*  --  3.9*   ALBUMIN  --  1.2*  --   --   --  1.6*  --  1.4*  --  1.6*   BILITOTAL  --  1.7*  --   --   --  1.1  --  1.2  --  0.8   ALKPHOS  --  124  --   --   --  120  --  99  --  114   AST  --  100*  --   --   --  95*  --  77*  --  73*   ALT  --  38  --   --   --  40  --  30  --  43   < > = values in this interval not displayed.  CBC  Recent Labs  Lab 17  0352 17  0453 17  0505 12/15/17  0421   WBC 23.4* 19.0* 23.1* 35.3*   RBC 2.48* 2.54* 2.87* 3.74*   HGB 7.7* 7.6* 8.9* 11.7   HCT 24.7* 23.9* 27.4* 36.7    94 96 98   MCH 31.0 29.9 31.0 31.3   MCHC 31.2* 31.8 32.5 31.9   RDW 18.6* 18.0* 17.2* 16.7*   PLT 77* 101* 90* 119*     INR  Recent  Labs  Lab 12/18/17  0352 12/16/17  0505 12/15/17  1614 12/15/17  1154   INR 1.62* 1.29* 1.42* 1.55*     Arterial Blood Gas  Recent Labs  Lab 12/18/17  0810 12/18/17  0352 12/18/17  0017 12/17/17  2159   PH 7.22* 7.28* 7.26* 7.29*   PCO2 77* 67* 68* 64*   PO2 85 73* 83 82   HCO3 31* 31* 31* 30*   O2PER 90 80.0 80.0 80.0       Physical exam:  General: Patient lying comfortably in bed, intubated and sedated   HEENT: no scleral icterus or injection, no bruits appreciated, no JVD, MMM  Cardiac: RRR, no m/r/g appreciated.   Respiratory: CTAB, no wheezes, rhonchi or crackles appreciated.  GI: hypoactive bowel sounds, soft and distended abdomen   Extremities: No LE edema, pulses DP 2+, radial pulses 2+   Skin: No acute lesions appreciated  Neuro: patient intubated and sedated    Imaging   CXR     Impression: Interval decrease of nodular opacities involving the  bilateral lungs which were previously described 12/17/2017 with stable  diffuse parenchymal opacities in the lungs..

## 2017-12-19 ENCOUNTER — APPOINTMENT (OUTPATIENT)
Dept: GENERAL RADIOLOGY | Facility: CLINIC | Age: 47
DRG: 917 | End: 2017-12-19
Attending: SURGERY
Payer: COMMERCIAL

## 2017-12-19 ENCOUNTER — APPOINTMENT (OUTPATIENT)
Dept: GENERAL RADIOLOGY | Facility: CLINIC | Age: 47
DRG: 917 | End: 2017-12-19
Attending: INTERNAL MEDICINE
Payer: COMMERCIAL

## 2017-12-19 LAB
ALBUMIN SERPL-MCNC: 2.4 G/DL (ref 3.4–5)
ALP SERPL-CCNC: 106 U/L (ref 40–150)
ALT SERPL W P-5'-P-CCNC: 28 U/L (ref 0–50)
ANION GAP SERPL CALCULATED.3IONS-SCNC: 6 MMOL/L (ref 3–14)
ANION GAP SERPL CALCULATED.3IONS-SCNC: 7 MMOL/L (ref 3–14)
ANION GAP SERPL CALCULATED.3IONS-SCNC: 8 MMOL/L (ref 3–14)
ANION GAP SERPL CALCULATED.3IONS-SCNC: 8 MMOL/L (ref 3–14)
ANION GAP SERPL CALCULATED.3IONS-SCNC: 9 MMOL/L (ref 3–14)
ANISOCYTOSIS BLD QL SMEAR: ABNORMAL
ANISOCYTOSIS BLD QL SMEAR: ABNORMAL
AST SERPL W P-5'-P-CCNC: 75 U/L (ref 0–45)
BASE EXCESS BLDA CALC-SCNC: 2.9 MMOL/L
BASE EXCESS BLDA CALC-SCNC: 3 MMOL/L
BASE EXCESS BLDA CALC-SCNC: 3.1 MMOL/L
BASE EXCESS BLDA CALC-SCNC: 3.2 MMOL/L
BASE EXCESS BLDA CALC-SCNC: 3.4 MMOL/L
BASE EXCESS BLDA CALC-SCNC: 3.6 MMOL/L
BASE EXCESS BLDA CALC-SCNC: 4.1 MMOL/L
BASE EXCESS BLDA CALC-SCNC: 5.5 MMOL/L
BASE EXCESS BLDA CALC-SCNC: 6.9 MMOL/L
BASO STIPL BLD QL SMEAR: PRESENT
BASOPHILS # BLD AUTO: 0 10E9/L (ref 0–0.2)
BASOPHILS # BLD AUTO: 0 10E9/L (ref 0–0.2)
BASOPHILS NFR BLD AUTO: 0 %
BASOPHILS NFR BLD AUTO: 0 %
BILIRUB SERPL-MCNC: 2.3 MG/DL (ref 0.2–1.3)
BLD PROD TYP BPU: NORMAL
BLD UNIT ID BPU: 0
BLOOD PRODUCT CODE: NORMAL
BPU ID: NORMAL
BUN SERPL-MCNC: 18 MG/DL (ref 7–30)
BUN SERPL-MCNC: 21 MG/DL (ref 7–30)
BUN SERPL-MCNC: 22 MG/DL (ref 7–30)
CALCIUM SERPL-MCNC: 10.2 MG/DL (ref 8.5–10.1)
CALCIUM SERPL-MCNC: 8.6 MG/DL (ref 8.5–10.1)
CALCIUM SERPL-MCNC: 8.8 MG/DL (ref 8.5–10.1)
CALCIUM SERPL-MCNC: 9.5 MG/DL (ref 8.5–10.1)
CALCIUM SERPL-MCNC: 9.8 MG/DL (ref 8.5–10.1)
CHLORIDE SERPL-SCNC: 104 MMOL/L (ref 94–109)
CHLORIDE SERPL-SCNC: 106 MMOL/L (ref 94–109)
CHLORIDE SERPL-SCNC: 108 MMOL/L (ref 94–109)
CHLORIDE SERPL-SCNC: 109 MMOL/L (ref 94–109)
CHLORIDE SERPL-SCNC: 111 MMOL/L (ref 94–109)
CO2 SERPL-SCNC: 27 MMOL/L (ref 20–32)
CO2 SERPL-SCNC: 30 MMOL/L (ref 20–32)
CO2 SERPL-SCNC: 31 MMOL/L (ref 20–32)
CO2 SERPL-SCNC: 32 MMOL/L (ref 20–32)
CO2 SERPL-SCNC: 32 MMOL/L (ref 20–32)
COPATH REPORT: NORMAL
CREAT SERPL-MCNC: 1.27 MG/DL (ref 0.52–1.04)
CREAT SERPL-MCNC: 1.28 MG/DL (ref 0.52–1.04)
CREAT SERPL-MCNC: 1.35 MG/DL (ref 0.52–1.04)
CREAT SERPL-MCNC: 1.36 MG/DL (ref 0.52–1.04)
CREAT SERPL-MCNC: 1.36 MG/DL (ref 0.52–1.04)
DIFFERENTIAL METHOD BLD: ABNORMAL
DIFFERENTIAL METHOD BLD: ABNORMAL
EOSINOPHIL # BLD AUTO: 0 10E9/L (ref 0–0.7)
EOSINOPHIL # BLD AUTO: 0 10E9/L (ref 0–0.7)
EOSINOPHIL NFR BLD AUTO: 0 %
EOSINOPHIL NFR BLD AUTO: 0 %
ERYTHROCYTE [DISTWIDTH] IN BLOOD BY AUTOMATED COUNT: 18.5 % (ref 10–15)
ERYTHROCYTE [DISTWIDTH] IN BLOOD BY AUTOMATED COUNT: 18.6 % (ref 10–15)
ERYTHROCYTE [DISTWIDTH] IN BLOOD BY AUTOMATED COUNT: 18.7 % (ref 10–15)
FIBRINOGEN PPP-MCNC: 341 MG/DL (ref 200–420)
GFR SERPL CREATININE-BSD FRML MDRD: 42 ML/MIN/1.7M2
GFR SERPL CREATININE-BSD FRML MDRD: 45 ML/MIN/1.7M2
GFR SERPL CREATININE-BSD FRML MDRD: 45 ML/MIN/1.7M2
GLUCOSE BLDC GLUCOMTR-MCNC: 100 MG/DL (ref 70–99)
GLUCOSE BLDC GLUCOMTR-MCNC: 101 MG/DL (ref 70–99)
GLUCOSE BLDC GLUCOMTR-MCNC: 105 MG/DL (ref 70–99)
GLUCOSE BLDC GLUCOMTR-MCNC: 125 MG/DL (ref 70–99)
GLUCOSE BLDC GLUCOMTR-MCNC: 128 MG/DL (ref 70–99)
GLUCOSE BLDC GLUCOMTR-MCNC: 128 MG/DL (ref 70–99)
GLUCOSE BLDC GLUCOMTR-MCNC: 140 MG/DL (ref 70–99)
GLUCOSE BLDC GLUCOMTR-MCNC: 140 MG/DL (ref 70–99)
GLUCOSE BLDC GLUCOMTR-MCNC: 141 MG/DL (ref 70–99)
GLUCOSE BLDC GLUCOMTR-MCNC: 144 MG/DL (ref 70–99)
GLUCOSE BLDC GLUCOMTR-MCNC: 145 MG/DL (ref 70–99)
GLUCOSE BLDC GLUCOMTR-MCNC: 147 MG/DL (ref 70–99)
GLUCOSE BLDC GLUCOMTR-MCNC: 148 MG/DL (ref 70–99)
GLUCOSE BLDC GLUCOMTR-MCNC: 148 MG/DL (ref 70–99)
GLUCOSE BLDC GLUCOMTR-MCNC: 153 MG/DL (ref 70–99)
GLUCOSE BLDC GLUCOMTR-MCNC: 153 MG/DL (ref 70–99)
GLUCOSE BLDC GLUCOMTR-MCNC: 155 MG/DL (ref 70–99)
GLUCOSE BLDC GLUCOMTR-MCNC: 157 MG/DL (ref 70–99)
GLUCOSE BLDC GLUCOMTR-MCNC: 158 MG/DL (ref 70–99)
GLUCOSE BLDC GLUCOMTR-MCNC: 158 MG/DL (ref 70–99)
GLUCOSE BLDC GLUCOMTR-MCNC: 159 MG/DL (ref 70–99)
GLUCOSE BLDC GLUCOMTR-MCNC: 159 MG/DL (ref 70–99)
GLUCOSE BLDC GLUCOMTR-MCNC: 161 MG/DL (ref 70–99)
GLUCOSE BLDC GLUCOMTR-MCNC: 163 MG/DL (ref 70–99)
GLUCOSE BLDC GLUCOMTR-MCNC: 164 MG/DL (ref 70–99)
GLUCOSE BLDC GLUCOMTR-MCNC: 164 MG/DL (ref 70–99)
GLUCOSE BLDC GLUCOMTR-MCNC: 165 MG/DL (ref 70–99)
GLUCOSE BLDC GLUCOMTR-MCNC: 166 MG/DL (ref 70–99)
GLUCOSE BLDC GLUCOMTR-MCNC: 167 MG/DL (ref 70–99)
GLUCOSE BLDC GLUCOMTR-MCNC: 169 MG/DL (ref 70–99)
GLUCOSE BLDC GLUCOMTR-MCNC: 170 MG/DL (ref 70–99)
GLUCOSE BLDC GLUCOMTR-MCNC: 170 MG/DL (ref 70–99)
GLUCOSE BLDC GLUCOMTR-MCNC: 171 MG/DL (ref 70–99)
GLUCOSE BLDC GLUCOMTR-MCNC: 172 MG/DL (ref 70–99)
GLUCOSE BLDC GLUCOMTR-MCNC: 175 MG/DL (ref 70–99)
GLUCOSE BLDC GLUCOMTR-MCNC: 179 MG/DL (ref 70–99)
GLUCOSE BLDC GLUCOMTR-MCNC: 188 MG/DL (ref 70–99)
GLUCOSE BLDC GLUCOMTR-MCNC: 213 MG/DL (ref 70–99)
GLUCOSE BLDC GLUCOMTR-MCNC: 221 MG/DL (ref 70–99)
GLUCOSE BLDC GLUCOMTR-MCNC: 221 MG/DL (ref 70–99)
GLUCOSE BLDC GLUCOMTR-MCNC: 232 MG/DL (ref 70–99)
GLUCOSE BLDC GLUCOMTR-MCNC: 94 MG/DL (ref 70–99)
GLUCOSE SERPL-MCNC: 120 MG/DL (ref 70–99)
GLUCOSE SERPL-MCNC: 145 MG/DL (ref 70–99)
GLUCOSE SERPL-MCNC: 157 MG/DL (ref 70–99)
GLUCOSE SERPL-MCNC: 165 MG/DL (ref 70–99)
GLUCOSE SERPL-MCNC: 237 MG/DL (ref 70–99)
HAPTOGLOB SERPL-MCNC: 63 MG/DL (ref 15–200)
HCO3 BLD-SCNC: 30 MMOL/L (ref 21–28)
HCO3 BLD-SCNC: 30 MMOL/L (ref 21–28)
HCO3 BLD-SCNC: 31 MMOL/L (ref 21–28)
HCO3 BLD-SCNC: 32 MMOL/L (ref 21–28)
HCO3 BLD-SCNC: 33 MMOL/L (ref 21–28)
HCO3 BLD-SCNC: 34 MMOL/L (ref 21–28)
HCT VFR BLD AUTO: 18.4 % (ref 35–47)
HCT VFR BLD AUTO: 18.5 % (ref 35–47)
HCT VFR BLD AUTO: 18.9 % (ref 35–47)
HGB BLD-MCNC: 5.8 G/DL (ref 11.7–15.7)
HGB BLD-MCNC: 5.8 G/DL (ref 11.7–15.7)
HGB BLD-MCNC: 5.9 G/DL (ref 11.7–15.7)
HGB BLD-MCNC: 7.9 G/DL (ref 11.7–15.7)
INR PPP: 1.73 (ref 0.86–1.14)
INTERPRETATION ECG - MUSE: NORMAL
INTERPRETATION ECG - MUSE: NORMAL
LACTATE BLD-SCNC: 2.9 MMOL/L (ref 0.7–2)
LACTATE BLD-SCNC: 3.1 MMOL/L (ref 0.7–2)
LACTATE BLD-SCNC: 3.7 MMOL/L (ref 0.7–2)
LDH SERPL L TO P-CCNC: 470 U/L (ref 81–234)
LYMPHOCYTES # BLD AUTO: 1.2 10E9/L (ref 0.8–5.3)
LYMPHOCYTES # BLD AUTO: 1.3 10E9/L (ref 0.8–5.3)
LYMPHOCYTES NFR BLD AUTO: 7.3 %
LYMPHOCYTES NFR BLD AUTO: 8.1 %
MACROCYTES BLD QL SMEAR: PRESENT
MACROCYTES BLD QL SMEAR: PRESENT
MAGNESIUM SERPL-MCNC: 1.9 MG/DL (ref 1.6–2.3)
MAGNESIUM SERPL-MCNC: 2.1 MG/DL (ref 1.6–2.3)
MAGNESIUM SERPL-MCNC: 2.3 MG/DL (ref 1.6–2.3)
MAGNESIUM SERPL-MCNC: 2.3 MG/DL (ref 1.6–2.3)
MAGNESIUM SERPL-MCNC: 2.5 MG/DL (ref 1.6–2.3)
MAGNESIUM SERPL-MCNC: 2.5 MG/DL (ref 1.6–2.3)
MCH RBC QN AUTO: 31.1 PG (ref 26.5–33)
MCH RBC QN AUTO: 31.2 PG (ref 26.5–33)
MCH RBC QN AUTO: 31.5 PG (ref 26.5–33)
MCHC RBC AUTO-ENTMCNC: 31.2 G/DL (ref 31.5–36.5)
MCHC RBC AUTO-ENTMCNC: 31.4 G/DL (ref 31.5–36.5)
MCHC RBC AUTO-ENTMCNC: 31.5 G/DL (ref 31.5–36.5)
MCV RBC AUTO: 100 FL (ref 78–100)
METAMYELOCYTES # BLD: 0.3 10E9/L
METAMYELOCYTES # BLD: 0.7 10E9/L
METAMYELOCYTES NFR BLD MANUAL: 1.8 %
METAMYELOCYTES NFR BLD MANUAL: 4.5 %
MONOCYTES # BLD AUTO: 0.4 10E9/L (ref 0–1.3)
MONOCYTES # BLD AUTO: 0.6 10E9/L (ref 0–1.3)
MONOCYTES NFR BLD AUTO: 2.7 %
MONOCYTES NFR BLD AUTO: 3.6 %
MYELOCYTES # BLD: 1 10E9/L
MYELOCYTES # BLD: 1.3 10E9/L
MYELOCYTES NFR BLD MANUAL: 6.4 %
MYELOCYTES NFR BLD MANUAL: 8.1 %
NEUTROPHILS # BLD AUTO: 12.1 10E9/L (ref 1.6–8.3)
NEUTROPHILS # BLD AUTO: 12.2 10E9/L (ref 1.6–8.3)
NEUTROPHILS NFR BLD AUTO: 74.8 %
NEUTROPHILS NFR BLD AUTO: 75.5 %
NRBC # BLD AUTO: 2 10*3/UL
NRBC # BLD AUTO: 2.7 10*3/UL
NRBC BLD AUTO-RTO: 13 /100
NRBC BLD AUTO-RTO: 16 /100
O2/TOTAL GAS SETTING VFR VENT: 50 %
O2/TOTAL GAS SETTING VFR VENT: 60 %
O2/TOTAL GAS SETTING VFR VENT: ABNORMAL %
OXYHGB MFR BLD: 90 % (ref 92–100)
OXYHGB MFR BLD: 94 % (ref 92–100)
PCO2 BLD: 63 MM HG (ref 35–45)
PCO2 BLD: 65 MM HG (ref 35–45)
PCO2 BLD: 68 MM HG (ref 35–45)
PCO2 BLD: 68 MM HG (ref 35–45)
PCO2 BLD: 69 MM HG (ref 35–45)
PCO2 BLD: 69 MM HG (ref 35–45)
PCO2 BLD: 70 MM HG (ref 35–45)
PCO2 BLD: 71 MM HG (ref 35–45)
PCO2 BLD: 73 MM HG (ref 35–45)
PH BLD: 7.24 PH (ref 7.35–7.45)
PH BLD: 7.26 PH (ref 7.35–7.45)
PH BLD: 7.27 PH (ref 7.35–7.45)
PH BLD: 7.28 PH (ref 7.35–7.45)
PH BLD: 7.28 PH (ref 7.35–7.45)
PH BLD: 7.29 PH (ref 7.35–7.45)
PH BLD: 7.31 PH (ref 7.35–7.45)
PHOSPHATE SERPL-MCNC: 2 MG/DL (ref 2.5–4.5)
PHOSPHATE SERPL-MCNC: 2.1 MG/DL (ref 2.5–4.5)
PHOSPHATE SERPL-MCNC: 2.1 MG/DL (ref 2.5–4.5)
PHOSPHATE SERPL-MCNC: 2.3 MG/DL (ref 2.5–4.5)
PHOSPHATE SERPL-MCNC: 2.5 MG/DL (ref 2.5–4.5)
PHOSPHATE SERPL-MCNC: 2.5 MG/DL (ref 2.5–4.5)
PLATELET # BLD AUTO: 60 10E9/L (ref 150–450)
PLATELET # BLD AUTO: 64 10E9/L (ref 150–450)
PLATELET # BLD AUTO: 64 10E9/L (ref 150–450)
PLATELET # BLD EST: ABNORMAL 10*3/UL
PLATELET # BLD EST: ABNORMAL 10*3/UL
PO2 BLD: 57 MM HG (ref 80–105)
PO2 BLD: 64 MM HG (ref 80–105)
PO2 BLD: 65 MM HG (ref 80–105)
PO2 BLD: 72 MM HG (ref 80–105)
PO2 BLD: 74 MM HG (ref 80–105)
PO2 BLD: 75 MM HG (ref 80–105)
PO2 BLD: 83 MM HG (ref 80–105)
PO2 BLD: 90 MM HG (ref 80–105)
PO2 BLD: 91 MM HG (ref 80–105)
POIKILOCYTOSIS BLD QL SMEAR: SLIGHT
POLYCHROMASIA BLD QL SMEAR: SLIGHT
POLYCHROMASIA BLD QL SMEAR: SLIGHT
POTASSIUM SERPL-SCNC: 3.2 MMOL/L (ref 3.4–5.3)
POTASSIUM SERPL-SCNC: 3.3 MMOL/L (ref 3.4–5.3)
POTASSIUM SERPL-SCNC: 3.4 MMOL/L (ref 3.4–5.3)
POTASSIUM SERPL-SCNC: 3.7 MMOL/L (ref 3.4–5.3)
POTASSIUM SERPL-SCNC: 4 MMOL/L (ref 3.4–5.3)
POTASSIUM SERPL-SCNC: 4 MMOL/L (ref 3.4–5.3)
PROMYELOCYTES # BLD MANUAL: 0.6 10E9/L
PROMYELOCYTES # BLD MANUAL: 0.6 10E9/L
PROMYELOCYTES NFR BLD MANUAL: 3.6 %
PROMYELOCYTES NFR BLD MANUAL: 3.6 %
PROT SERPL-MCNC: 4.3 G/DL (ref 6.8–8.8)
RBC # BLD AUTO: 1.84 10E12/L (ref 3.8–5.2)
RBC # BLD AUTO: 1.86 10E12/L (ref 3.8–5.2)
RBC # BLD AUTO: 1.9 10E12/L (ref 3.8–5.2)
RETICS # AUTO: 94.9 10E9/L (ref 25–95)
RETICS # AUTO: 94.9 10E9/L (ref 25–95)
RETICS/RBC NFR AUTO: 5.1 % (ref 0.5–2)
RETICS/RBC NFR AUTO: 5.2 % (ref 0.5–2)
SODIUM SERPL-SCNC: 144 MMOL/L (ref 133–144)
SODIUM SERPL-SCNC: 145 MMOL/L (ref 133–144)
SODIUM SERPL-SCNC: 146 MMOL/L (ref 133–144)
SODIUM SERPL-SCNC: 147 MMOL/L (ref 133–144)
SODIUM SERPL-SCNC: 147 MMOL/L (ref 133–144)
TRANSFUSION STATUS PATIENT QL: NORMAL
TRANSFUSION STATUS PATIENT QL: NORMAL
WBC # BLD AUTO: 16.2 10E9/L (ref 4–11)
WBC # BLD AUTO: 16.2 10E9/L (ref 4–11)
WBC # BLD AUTO: 16.6 10E9/L (ref 4–11)

## 2017-12-19 PROCEDURE — 00000146 ZZHCL STATISTIC GLUCOSE BY METER IP

## 2017-12-19 PROCEDURE — 40000275 ZZH STATISTIC RCP TIME EA 10 MIN

## 2017-12-19 PROCEDURE — 86901 BLOOD TYPING SEROLOGIC RH(D): CPT | Performed by: STUDENT IN AN ORGANIZED HEALTH CARE EDUCATION/TRAINING PROGRAM

## 2017-12-19 PROCEDURE — 85025 COMPLETE CBC W/AUTO DIFF WBC: CPT | Performed by: STUDENT IN AN ORGANIZED HEALTH CARE EDUCATION/TRAINING PROGRAM

## 2017-12-19 PROCEDURE — 85384 FIBRINOGEN ACTIVITY: CPT | Performed by: STUDENT IN AN ORGANIZED HEALTH CARE EDUCATION/TRAINING PROGRAM

## 2017-12-19 PROCEDURE — 40000611 ZZHCL STATISTIC MORPHOLOGY W/INTERP HEMEPATH TC 85060: Performed by: STUDENT IN AN ORGANIZED HEALTH CARE EDUCATION/TRAINING PROGRAM

## 2017-12-19 PROCEDURE — 83605 ASSAY OF LACTIC ACID: CPT | Performed by: INTERNAL MEDICINE

## 2017-12-19 PROCEDURE — 85045 AUTOMATED RETICULOCYTE COUNT: CPT | Performed by: INTERNAL MEDICINE

## 2017-12-19 PROCEDURE — 99291 CRITICAL CARE FIRST HOUR: CPT | Mod: GC | Performed by: INTERNAL MEDICINE

## 2017-12-19 PROCEDURE — 94645 CONT INHLJ TX EACH ADDL HOUR: CPT

## 2017-12-19 PROCEDURE — 27210995 ZZH RX 272: Performed by: INTERNAL MEDICINE

## 2017-12-19 PROCEDURE — 82810 BLOOD GASES O2 SAT ONLY: CPT | Performed by: INTERNAL MEDICINE

## 2017-12-19 PROCEDURE — 84132 ASSAY OF SERUM POTASSIUM: CPT | Performed by: INTERNAL MEDICINE

## 2017-12-19 PROCEDURE — 25000132 ZZH RX MED GY IP 250 OP 250 PS 637: Performed by: STUDENT IN AN ORGANIZED HEALTH CARE EDUCATION/TRAINING PROGRAM

## 2017-12-19 PROCEDURE — 25000128 H RX IP 250 OP 636: Performed by: STUDENT IN AN ORGANIZED HEALTH CARE EDUCATION/TRAINING PROGRAM

## 2017-12-19 PROCEDURE — 86900 BLOOD TYPING SEROLOGIC ABO: CPT | Performed by: STUDENT IN AN ORGANIZED HEALTH CARE EDUCATION/TRAINING PROGRAM

## 2017-12-19 PROCEDURE — 25000128 H RX IP 250 OP 636: Performed by: INTERNAL MEDICINE

## 2017-12-19 PROCEDURE — 86850 RBC ANTIBODY SCREEN: CPT | Performed by: STUDENT IN AN ORGANIZED HEALTH CARE EDUCATION/TRAINING PROGRAM

## 2017-12-19 PROCEDURE — 20000004 ZZH R&B ICU UMMC

## 2017-12-19 PROCEDURE — 80048 BASIC METABOLIC PNL TOTAL CA: CPT | Performed by: INTERNAL MEDICINE

## 2017-12-19 PROCEDURE — 71010 XR CHEST PORT 1 VW: CPT

## 2017-12-19 PROCEDURE — 86923 COMPATIBILITY TEST ELECTRIC: CPT | Performed by: STUDENT IN AN ORGANIZED HEALTH CARE EDUCATION/TRAINING PROGRAM

## 2017-12-19 PROCEDURE — 85610 PROTHROMBIN TIME: CPT | Performed by: STUDENT IN AN ORGANIZED HEALTH CARE EDUCATION/TRAINING PROGRAM

## 2017-12-19 PROCEDURE — 27210995 ZZH RX 272: Performed by: STUDENT IN AN ORGANIZED HEALTH CARE EDUCATION/TRAINING PROGRAM

## 2017-12-19 PROCEDURE — 82803 BLOOD GASES ANY COMBINATION: CPT | Performed by: INTERNAL MEDICINE

## 2017-12-19 PROCEDURE — 93005 ELECTROCARDIOGRAM TRACING: CPT

## 2017-12-19 PROCEDURE — 25000125 ZZHC RX 250: Performed by: INTERNAL MEDICINE

## 2017-12-19 PROCEDURE — 25000132 ZZH RX MED GY IP 250 OP 250 PS 637: Performed by: INTERNAL MEDICINE

## 2017-12-19 PROCEDURE — 80053 COMPREHEN METABOLIC PANEL: CPT | Performed by: INTERNAL MEDICINE

## 2017-12-19 PROCEDURE — 85018 HEMOGLOBIN: CPT | Performed by: INTERNAL MEDICINE

## 2017-12-19 PROCEDURE — 40000141 ZZH STATISTIC PERIPHERAL IV START W/O US GUIDANCE

## 2017-12-19 PROCEDURE — 83735 ASSAY OF MAGNESIUM: CPT | Performed by: INTERNAL MEDICINE

## 2017-12-19 PROCEDURE — 40000196 ZZH STATISTIC RAPCV CVP MONITORING

## 2017-12-19 PROCEDURE — 94003 VENT MGMT INPAT SUBQ DAY: CPT

## 2017-12-19 PROCEDURE — 82805 BLOOD GASES W/O2 SATURATION: CPT | Performed by: INTERNAL MEDICINE

## 2017-12-19 PROCEDURE — 85045 AUTOMATED RETICULOCYTE COUNT: CPT | Performed by: STUDENT IN AN ORGANIZED HEALTH CARE EDUCATION/TRAINING PROGRAM

## 2017-12-19 PROCEDURE — 40000986 XR CHEST PORT 1 VW

## 2017-12-19 PROCEDURE — 25800025 ZZH RX 258: Performed by: STUDENT IN AN ORGANIZED HEALTH CARE EDUCATION/TRAINING PROGRAM

## 2017-12-19 PROCEDURE — P9047 ALBUMIN (HUMAN), 25%, 50ML: HCPCS | Performed by: INTERNAL MEDICINE

## 2017-12-19 PROCEDURE — 84100 ASSAY OF PHOSPHORUS: CPT | Performed by: INTERNAL MEDICINE

## 2017-12-19 PROCEDURE — 40000014 ZZH STATISTIC ARTERIAL MONITORING DAILY

## 2017-12-19 PROCEDURE — 83615 LACTATE (LD) (LDH) ENZYME: CPT | Performed by: STUDENT IN AN ORGANIZED HEALTH CARE EDUCATION/TRAINING PROGRAM

## 2017-12-19 PROCEDURE — 83010 ASSAY OF HAPTOGLOBIN QUANT: CPT | Performed by: STUDENT IN AN ORGANIZED HEALTH CARE EDUCATION/TRAINING PROGRAM

## 2017-12-19 PROCEDURE — P9016 RBC LEUKOCYTES REDUCED: HCPCS | Performed by: STUDENT IN AN ORGANIZED HEALTH CARE EDUCATION/TRAINING PROGRAM

## 2017-12-19 PROCEDURE — 85027 COMPLETE CBC AUTOMATED: CPT | Performed by: INTERNAL MEDICINE

## 2017-12-19 PROCEDURE — 25000125 ZZHC RX 250: Performed by: STUDENT IN AN ORGANIZED HEALTH CARE EDUCATION/TRAINING PROGRAM

## 2017-12-19 PROCEDURE — 85025 COMPLETE CBC W/AUTO DIFF WBC: CPT | Performed by: INTERNAL MEDICINE

## 2017-12-19 RX ORDER — LACTULOSE 10 G/15ML
20 SOLUTION ORAL 3 TIMES DAILY
Status: DISCONTINUED | OUTPATIENT
Start: 2017-12-19 | End: 2017-12-21

## 2017-12-19 RX ORDER — METOLAZONE 2.5 MG/1
10 TABLET ORAL DAILY
Status: DISCONTINUED | OUTPATIENT
Start: 2017-12-19 | End: 2017-12-20

## 2017-12-19 RX ADMIN — POTASSIUM CHLORIDE 20 MEQ: 400 INJECTION, SOLUTION INTRAVENOUS at 16:23

## 2017-12-19 RX ADMIN — METHYLPREDNISOLONE 40 MG: 40 INJECTION, POWDER, LYOPHILIZED, FOR SOLUTION INTRAMUSCULAR; INTRAVENOUS at 22:36

## 2017-12-19 RX ADMIN — EPOPROSTENOL 20 NG/KG/MIN: 1.5 INJECTION, POWDER, LYOPHILIZED, FOR SOLUTION INTRAVENOUS at 20:24

## 2017-12-19 RX ADMIN — POTASSIUM CHLORIDE 20 MEQ: 1.5 POWDER, FOR SOLUTION ORAL at 10:14

## 2017-12-19 RX ADMIN — MAGNESIUM SULFATE IN DEXTROSE 1 G: 10 INJECTION, SOLUTION INTRAVENOUS at 11:45

## 2017-12-19 RX ADMIN — ALBUMIN (HUMAN) 25 G: 12.5 SOLUTION INTRAVENOUS at 02:47

## 2017-12-19 RX ADMIN — LEVOTHYROXINE SODIUM ANHYDROUS 25 MCG: 100 INJECTION, POWDER, LYOPHILIZED, FOR SOLUTION INTRAVENOUS at 07:56

## 2017-12-19 RX ADMIN — POTASSIUM CHLORIDE 20 MEQ: 1.5 POWDER, FOR SOLUTION ORAL at 23:23

## 2017-12-19 RX ADMIN — DEXTROSE MONOHYDRATE: 70 INJECTION, SOLUTION INTRAVENOUS at 18:26

## 2017-12-19 RX ADMIN — MAGNESIUM SULFATE IN DEXTROSE 1 G: 10 INJECTION, SOLUTION INTRAVENOUS at 17:04

## 2017-12-19 RX ADMIN — DEXTROSE MONOHYDRATE: 70 INJECTION, SOLUTION INTRAVENOUS at 09:19

## 2017-12-19 RX ADMIN — POTASSIUM CHLORIDE 20 MEQ: 400 INJECTION, SOLUTION INTRAVENOUS at 01:24

## 2017-12-19 RX ADMIN — BISACODYL 10 MG: 10 SUPPOSITORY RECTAL at 07:57

## 2017-12-19 RX ADMIN — DEXTROSE MONOHYDRATE 4 MCG/KG/MIN: 5 INJECTION, SOLUTION INTRAVENOUS at 03:37

## 2017-12-19 RX ADMIN — FENTANYL CITRATE 50 MCG: 50 INJECTION, SOLUTION INTRAMUSCULAR; INTRAVENOUS at 23:17

## 2017-12-19 RX ADMIN — PROPOFOL 20 MCG/KG/MIN: 10 INJECTION, EMULSION INTRAVENOUS at 15:11

## 2017-12-19 RX ADMIN — Medication 10 MG/HR: at 23:15

## 2017-12-19 RX ADMIN — POTASSIUM CHLORIDE 20 MEQ: 400 INJECTION, SOLUTION INTRAVENOUS at 00:39

## 2017-12-19 RX ADMIN — LACTULOSE 20 G: 20 SOLUTION ORAL at 11:01

## 2017-12-19 RX ADMIN — FOLIC ACID 1 MG: 5 INJECTION, SOLUTION INTRAMUSCULAR; INTRAVENOUS; SUBCUTANEOUS at 08:02

## 2017-12-19 RX ADMIN — THIAMINE HYDROCHLORIDE 100 MG: 100 INJECTION, SOLUTION INTRAMUSCULAR; INTRAVENOUS at 20:29

## 2017-12-19 RX ADMIN — POTASSIUM CHLORIDE 20 MEQ: 1.5 POWDER, FOR SOLUTION ORAL at 13:00

## 2017-12-19 RX ADMIN — MIDAZOLAM 2 MG: 1 INJECTION INTRAMUSCULAR; INTRAVENOUS at 23:00

## 2017-12-19 RX ADMIN — BISACODYL 10 MG: 10 SUPPOSITORY RECTAL at 20:30

## 2017-12-19 RX ADMIN — FUROSEMIDE 10 MG/HR: 10 INJECTION, SOLUTION INTRAVENOUS at 03:59

## 2017-12-19 RX ADMIN — Medication 150 MCG/HR: at 01:33

## 2017-12-19 RX ADMIN — LACTULOSE 20 G: 20 SOLUTION ORAL at 14:24

## 2017-12-19 RX ADMIN — DEXTROSE MONOHYDRATE 4 MCG/KG/MIN: 5 INJECTION, SOLUTION INTRAVENOUS at 21:00

## 2017-12-19 RX ADMIN — IPRATROPIUM BROMIDE AND ALBUTEROL SULFATE 3 ML: .5; 3 SOLUTION RESPIRATORY (INHALATION) at 12:36

## 2017-12-19 RX ADMIN — METHYLPREDNISOLONE 40 MG: 40 INJECTION, POWDER, LYOPHILIZED, FOR SOLUTION INTRAMUSCULAR; INTRAVENOUS at 05:54

## 2017-12-19 RX ADMIN — Medication 3 MG: at 20:31

## 2017-12-19 RX ADMIN — ENOXAPARIN SODIUM 40 MG: 40 INJECTION SUBCUTANEOUS at 10:23

## 2017-12-19 RX ADMIN — Medication 3 MG: at 13:01

## 2017-12-19 RX ADMIN — CEFTRIAXONE 2 G: 10 INJECTION, POWDER, FOR SOLUTION INTRAVENOUS at 13:01

## 2017-12-19 RX ADMIN — Medication 10 MG/HR: at 15:12

## 2017-12-19 RX ADMIN — METHYLPREDNISOLONE 40 MG: 40 INJECTION, POWDER, LYOPHILIZED, FOR SOLUTION INTRAMUSCULAR; INTRAVENOUS at 14:41

## 2017-12-19 RX ADMIN — EPOPROSTENOL 20 NG/KG/MIN: 1.5 INJECTION, POWDER, LYOPHILIZED, FOR SOLUTION INTRAVENOUS at 02:28

## 2017-12-19 RX ADMIN — MAGNESIUM SULFATE IN DEXTROSE 1 G: 10 INJECTION, SOLUTION INTRAVENOUS at 06:05

## 2017-12-19 RX ADMIN — PANTOPRAZOLE SODIUM 40 MG: 40 INJECTION, POWDER, FOR SOLUTION INTRAVENOUS at 07:56

## 2017-12-19 RX ADMIN — MINERAL OIL AND WHITE PETROLATUM: 150; 830 OINTMENT OPHTHALMIC at 21:00

## 2017-12-19 RX ADMIN — Medication 150 MCG/HR: at 08:59

## 2017-12-19 RX ADMIN — THIAMINE HYDROCHLORIDE 100 MG: 100 INJECTION, SOLUTION INTRAMUSCULAR; INTRAVENOUS at 07:56

## 2017-12-19 RX ADMIN — EPOPROSTENOL 20 NG/KG/MIN: 1.5 INJECTION, POWDER, LYOPHILIZED, FOR SOLUTION INTRAVENOUS at 13:54

## 2017-12-19 RX ADMIN — POTASSIUM PHOSPHATE, MONOBASIC AND POTASSIUM PHOSPHATE, DIBASIC 15 MMOL: 224; 236 INJECTION, SOLUTION INTRAVENOUS at 09:04

## 2017-12-19 RX ADMIN — IPRATROPIUM BROMIDE AND ALBUTEROL SULFATE 3 ML: .5; 3 SOLUTION RESPIRATORY (INHALATION) at 20:43

## 2017-12-19 RX ADMIN — DEXTROSE MONOHYDRATE: 70 INJECTION, SOLUTION INTRAVENOUS at 13:47

## 2017-12-19 RX ADMIN — PROPOFOL 20.06 MCG/KG/MIN: 10 INJECTION, EMULSION INTRAVENOUS at 02:47

## 2017-12-19 RX ADMIN — POTASSIUM CHLORIDE 40 MEQ: 1.5 POWDER, FOR SOLUTION ORAL at 21:38

## 2017-12-19 RX ADMIN — POTASSIUM PHOSPHATE, MONOBASIC AND POTASSIUM PHOSPHATE, DIBASIC 10 MMOL: 224; 236 INJECTION, SOLUTION INTRAVENOUS at 18:19

## 2017-12-19 RX ADMIN — Medication 3 MG: at 08:00

## 2017-12-19 RX ADMIN — POTASSIUM CHLORIDE 20 MEQ: 400 INJECTION, SOLUTION INTRAVENOUS at 04:44

## 2017-12-19 RX ADMIN — METOLAZONE 10 MG: 2.5 TABLET ORAL at 10:14

## 2017-12-19 RX ADMIN — LACTULOSE 20 G: 20 SOLUTION ORAL at 20:29

## 2017-12-19 RX ADMIN — MINERAL OIL AND WHITE PETROLATUM: 150; 830 OINTMENT OPHTHALMIC at 05:55

## 2017-12-19 RX ADMIN — IPRATROPIUM BROMIDE AND ALBUTEROL SULFATE 3 ML: .5; 3 SOLUTION RESPIRATORY (INHALATION) at 15:57

## 2017-12-19 RX ADMIN — Medication 10 MG/HR: at 04:56

## 2017-12-19 RX ADMIN — Medication 150 MCG/HR: at 19:04

## 2017-12-19 RX ADMIN — EPOPROSTENOL 20 NG/KG/MIN: 1.5 INJECTION, POWDER, LYOPHILIZED, FOR SOLUTION INTRAVENOUS at 08:15

## 2017-12-19 RX ADMIN — IPRATROPIUM BROMIDE AND ALBUTEROL SULFATE 3 ML: .5; 3 SOLUTION RESPIRATORY (INHALATION) at 07:24

## 2017-12-19 RX ADMIN — MINERAL OIL AND WHITE PETROLATUM: 150; 830 OINTMENT OPHTHALMIC at 13:08

## 2017-12-19 NOTE — PROGRESS NOTES
Social Work Services Progress Note    Hospital Day: 7  Date of Initial Social Work Evaluation:  12/14/17  Collaborated with:  Pt's     Data:  Pt's , Gabriel Wharton, left a message for FELICIA requesting return contact.    Intervention:  Writer contacted Gabriel per his request. Gabriel stated that he has some concerns about Pt's history and her need for psychiatric care. Gabriel spent some time reviewing Pt's mental health history and suicide attempts. He also reviewed his relationship history with Pt and expressed his ongoing love for Pt despite their pending divorce. He states that their relationship suffered in the last couple years when she started drinking alcohol, smoking, and going to dance clubs, spending less time with their son (now 15 yrs old). She has had multiple suicide attempts in the last couple years. Gabriel is very worried about Pt's mental health and the possibility that she will continue to feel suicidal. He states that he went to her home last night and spent 4.5 hours cleaning up and states that, based on what he saw in the home, Pt will be very surprised and angry to wake up and realize that she had failed in her attempt. He states that Pt's home was a mess and that her safe was standing open, with all of the paperwork removed and sitting out, including paperwork regarding her cremation plans. He expresses a strong concern that she needs inpatient psychiatric hospitalization. Writer provided support and validation of his concerns, and offered to discuss his concerns with Pt's treatment team.     Assessment:  SW providing support and collaboration with Pt's  and treatment team.    Plan:    Anticipated Disposition:  pending further evaluation    Barriers to d/c plan:  Medical stabilization    Follow Up:  FELICIA will follow-up with Pt's treatment team.      Sue Claudio [facility]  Emergency Department   Pager: [phone]

## 2017-12-19 NOTE — PLAN OF CARE
Problem: Patient Care Overview  Goal: Plan of Care/Patient Progress Review  Outcome: No Change  D/I: Pt remains intubated, sedated and medically paralyzed on Cis. Small vent changes made throughout the day. Suctioning moderate amounts of thick vivian secretions from ETT. Supinated at 0845 this AM and re-proned at 1315 this afternoon and tolerated well. Presently on 50% FiO2 and a PEEP of 12 with full strength flolan. Heart rate has continued sinus tach 110's, and blood pressure have maintained MAP's >65. High dose insulin held this AM, see charting for time held. Electrolytes replaced multiple times per protocol. Less ectopy noted, no runs of SVT or A-fib. Urine output great with lasix drip and added metolazone PO. One small BM. Blood sugars have dipped under 100, using D50 drip to keep just above 100. Facial edema, especially scleral and periorbital much worse. Using ice packs as able to help with swelling.   A: Tenuous respiratory status.   P: Continue to monitor and assess. Follow treatment plan.

## 2017-12-19 NOTE — PLAN OF CARE
Problem: Patient Care Overview  Goal: Plan of Care/Patient Progress Review  Outcome: Declining  Pt remains intubated on Cis. Currently at 3mcg/kg/min. See mar for sedation meds. Flolan continues per orders with pt, prone since 1100. MD aware and instructed to keep pt prone for duration of night shift. MAP from 63- 85's. Blood glucose levels monitored every 30 minutes with pt on high dose insulin therapy. All electrolytes replaced after frequent ectopy noted as patient continues to go in and out of SVT. SVT runs have subsided since last electrolyte replacement. Continued suctioning small amounts of blood tinged secretions from ETT. Urine output has been adequate on lasix drip at approximately  150-175/hr. 0400 labs drawn with hgb of 5.9, redraw ordered and noted to be 5.8. MD notified and 1 unit of blood ordered, awaiting type and cross for blood release.  No BM overnight. No other issues noted. Will continue to monitor and address as needed.

## 2017-12-19 NOTE — PLAN OF CARE
Problem: Patient Care Overview  Goal: Plan of Care/Patient Progress Review  Outcome: Declining  D/I: Pt has remained intubated and highly sedated on paralytic medication. Flolan initiated this morning per orders, and patient was proned at 1100. Ventilator changes made throughout the day based on blood gases. Levophed is presently off and patient has had blood pressures ranging from MAP of 69- 70's. Calcium infusion stopped per orders this AM Blood glucose levels monitored every 30 minutes. All electrolytes being replaced this afternoon after frequent ectopy noted and patient would flip in and out of SVT. Suctioning small amounts of blood tinged secretions from ETT. Urine output has been adequate on lasix drip. One medium bowel movement this afternoon.   A: Tenuous respiratory and hemodynamic status.   P: Continue to monitor and assess. Follow treatment plan.

## 2017-12-19 NOTE — PROGRESS NOTES
Medical ICU Progress Note    Patient:  Ana Laura Wharton   Date of birth 1970, Medical record number [id number]  Date of Visit:  12/19/2017  Date of Admission: 12/13/2017    Assessment and Plan:     Ana Laura Wharton is a 46 yo female with hx of bipolar disorder with depression, polysubstance abuse, & prior suicide attempts, admitted to [facility]  Following calcium channel blocker (CCB) overdose, tylenol overdose & alcohol overdose. Course complicated by lactic acidosis, hypoventilation, acute renal failure, & severe hypotension/vasogenic shock. Currently intubated, sedated, & proned.       Concise Assessment and Plan  # Suicide Attempt by acetaminophen, ETOH, and amlodipine  - Continuing to wean insulin drip with goal MAP >65    # ARDS 2/2 aspiration  - Continue ARDSnet protocol  - Prone and paralyze  - Continue Flolan     # Volume Overload   - Lasix 10 mg/hr and metolazone 10 mg qday     NEURO  Sedation/Analgesia:  Midazolam gtt   Fentanyl gtt   Propofol gtt       Paralytic: cisatracurium gtt d/c 12/16      Bipolar I disorder with anxiety & depression  Suicide attempt with polysubstance abuse (amlodipine, nyquil (acetaminophen), alcohol (vodka))  Completed poison control recommendations of calcium drip with goal of ical 10. Further, completed high dose insulin drip and will trend BPs.   - Manage OD as per below  - Will monitor for withdrawal symptoms once weaned from sedation (ETOH 0.17 at OSH). IV thiamine & folate  - Holding home zyprexa & remeron.  - Psych consult for OD when awake      Agitation: Patient at high risk of alcohol & benzo withdrawal  - Slow wean of sedation when medically appropriate      RESPIRATORY  Ventilation Mode: CMV/AC  FiO2 (%): 60 %  Rate Set (breaths/minute): 33 breaths/min  Tidal Volume Set (mL): 410 mL  PEEP (cm H2O): 12 cmH2O  Oxygen Concentration (%): 60 %  Resp: 33'     ARDS 2/2 aspiration   Acute Hypoxic  Respiratory Failure:  Acute Hypercarbic Respiratory Failure  Due initially  to hypoventilation due to overdose, now due to V/Q mismatch in the setting of ARDS. ARDS net vent settings for lung protection   - Prone and paralyze today for lung recruitment  - Inhaled Flolan for increased oxygenation   - Vent settings as per above  - Titrate O2 to sats >90%, q4 ABG  - Solumedrol in the setting of recurrent hypercarbia and concern for air trapping in the setting of possible COPD.   - Duonebs for air trapping      CARDIOVASCULAR  Hypotension/Vasodilatory shock/Vasoplegia: Requiring pressors. Side effect of CCB overdose. ECHO shows normal EF of 70%. Normal RV function. She has been titrated off norepi and has been hemodynamically stable. Further, she has been titrated off her insulin and pressures have been stable.   - Bicarbonate gtt discontinued as pH >7.25  - Continue stress dose steroids until post ARDS  - Restart insulin at 6 units/kg/hr if patient becomes hypotensive       Paroxysmal atrial fibrillation: Briefly in Afib with RVR this AM. Received metoprolol x 2. Converted to NSR. Will hopefully improve once fluid status improves  - No acute need for management at this time      Intermittent SVT  Patient has had SVT with rates in the 190s intermittently and spontaneously resolves. Hemodynamically stable during episodes of SVT  - Patient has been stable without receiving further medications    RENAL / FLUIDS / ELECTROLYTES  Baseline creatinine: 0.7-0.8.       Acute Renal Failure 2/2 calcium channel blocker overdose:   As high as 1.6.  Due to CCB overdose. Volume overload, pulmonary edema. Cr slowly improving with diuresis & forward flow  - Lasix gtt at 10 mg/hr  - Metolazone 10 mg NG tube   - I/Os, Monitor Cr, UOP  - Monitoring lytes, replace      Lactic Acidosis/AGMA: Due to hypoperfusion. Lactate may continue to be elevated until after CCB has cleared  - Continue to trend  - Goal of pH>7.25, bicarb gtt as necessary      GASTROINTESTINAL  MELD-Na score: 18 at 12/19/2017 12:00 PM  MELD score:  18 at 12/19/2017 12:00 PM  Calculated from:  Serum Creatinine: 1.28 mg/dL at 12/19/2017 12:00 PM  Serum Sodium: 144 mmol/L (Rounded to 137) at 12/19/2017 12:00 PM  Total Bilirubin: 2.3 mg/dL at 12/19/2017  3:51 AM  INR(ratio): 1.73 at 12/19/2017  5:45 AM  Age: 47 years      Acetaminophen overdose  Elevated Transaminases  Found next to bottle of Nyquil. AST/ALTs now normal s/p NAC gtt.   - Trend LFTs, INR      OG: OG to LIS      Hx of C.diff: Hx of fecal transplant. Has had formed stool      Tube Feeds: Re-evaluate daily      INFECTIOUS DISEASE     Aspiration Pneumonia:   Patient has evidence of aspiration in lower lungs and is febrile. Patient is allergic to amoxicillin-clavulanic acid.   - Initiated ceftriaxone and metronidazole for aspiration pneumonia     Cultures:  UA 12/13 negative  Blood Cx 12/13 NGTD      Antibiotics:  Vancomycin (12/14->12/16)  Cefepime (12/14->12/16)      HEMATOLOGY   Anemia: Normocytic Anemia. Likely due to initial dilution & chronic illness. Haptoglobin and firbrinogen normal with LDH elevated. Hemolysis unlikely. Patient has elevated reticulocyte count, but not quite appropriately elevated   - Poor reticulocyte level in the setting of anemia  - Continue to trend hgb   - Tranfuse < 7      Thrombocytopenia: Unknown etiology. Does not appear to be HIT related. Stable at 100  - Monitor daily      INR: As high as 1.55 in the setting of overdose & liver injury. 1.29 today  - Monitor daily      ENDOCRINE  Insulin Drip for CCB  Monitoring q1 hour glucose as insulin drip recently stopped      Hypothyroidism: Levothyroxine IV, switch back to oral once able      SKIN CARE  No acute concerns      Prophylaxis: DVT: Lovenox: GI: PPI  Code status: Full  Lines: RIJ, RAL, PIV x3     Patient seen and discussed with Dr. Rogers who agrees with the assessment and plan     Raymon Pedro MD  PGY2  [phone]      Interval Events    Patient found to have anemia with hgb of 5.8. No acute bleeding noted.      Objective    Temperatures:  Current - Temp: 99.8  F (37.7  C); Max - Temp  Av.9  F (37.7  C)  Min: 99.4  F (37.4  C)  Max: 100.8  F (38.2  C)  Respiration range: Resp  Av.4  Min: 32  Max: 35  Pulse range: Heart Rate  Av.5  Min: 107  Max: 184  Blood pressure range: No data recorded.  ; No data recorded.    Pulse oximetry range: SpO2  Av.1 %  Min: 65 %  Max: 100 %    Ventilation Mode: CMV/AC  FiO2 (%): 60 %  Rate Set (breaths/minute): 35 breaths/min  Tidal Volume Set (mL): 400 mL  PEEP (cm H2O): 10 cmH2O  Oxygen Concentration (%): 60 %  Resp: 35    Physical Exam    ROUTINE ICU LABS (Last four results)  CMP  Recent Labs  Lab 17  0351 17  2330 17  2306 17  1957 17  1545  17  0352  17  0453  17  0505   * 147*  --  148* 148*  < > 152*  < > 152*  < > 150*   POTASSIUM 4.0 3.3* 3.5 3.2* 3.1*  < > 4.8  < > 3.3*  < > 3.6   CHLORIDE 109 111*  --  113* 111*  < > 115*  < > 112*  < > 110*   CO2 31 27  --  28 28  < > 33*  < > 32  < > 36*   ANIONGAP 7 8  --  7 10  < > 4  < > 8  < > 4   * 165*  --  153* 191*  < > 185*  < > 217*  < > 167*   BUN 21 18  --  18 20  < > 19  < > 20  < > 20   CR 1.35* 1.27*  --  1.30* 1.44*  < > 1.23*  < > 1.15*  < > 1.18*   GFRESTIMATED 42* 45*  --  44* 39*  < > 47*  < > 51*  < > 49*   GFRESTBLACK 51* 55*  --  53* 47*  < > 57*  < > 61  < > 59*   ISAURO 9.8 10.2*  --  10.7* 12.9*  < > 14.4*  < > 14.8*  < > 13.7*   MAG 2.1 1.9  --  2.0 2.0  < > 2.3  < > 1.9  < > 2.5*   PHOS 2.1* 2.1*  --  1.5* 1.9*  < > 4.2  < > 6.6*  < > 2.2*   PROTTOTAL 4.3*  --   --   --   --   --  3.5*  --  4.0*  --  3.7*   ALBUMIN 2.4*  --   --   --   --   --  1.2*  --  1.6*  --  1.4*   BILITOTAL 2.3*  --   --   --   --   --  1.7*  --  1.1  --  1.2   ALKPHOS 106  --   --   --   --   --  124  --  120  --  99   AST 75*  --   --   --   --   --  100*  --  95*  --  77*   ALT 28  --   --   --   --   --  38  --  40  --  30   < > = values in this interval not  displayed.  CBC  Recent Labs  Lab 12/19/17  0545 12/19/17  0432 12/19/17  0351 12/18/17  0352   WBC 16.2* 16.6* 16.2* 23.4*   RBC 1.86* 1.84* 1.90* 2.48*   HGB 5.8* 5.8* 5.9* 7.7*   HCT 18.5* 18.4* 18.9* 24.7*    100 100 100   MCH 31.2 31.5 31.1 31.0   MCHC 31.4* 31.5 31.2* 31.2*   RDW 18.6* 18.7* 18.5* 18.6*   PLT 64* 60* 64* 77*     INR  Recent Labs  Lab 12/19/17  0545 12/18/17  0352 12/16/17  0505 12/15/17  1614   INR 1.73* 1.62* 1.29* 1.42*     Arterial Blood Gas  Recent Labs  Lab 12/19/17  0800 12/19/17  0351 12/19/17  0210 12/18/17  2330   PH 7.27* 7.28* 7.29* 7.27*   PCO2 68* 65* 63* 64*   PO2 91 75* 74* 89   HCO3 31* 30* 30* 29*   O2PER 60 50% 50.0 60.0       Physical exam:  General: Patient lying in bed proned   HEENT:no scleral icterus or injection, no bruits appreciated, no JVD, MMM  Cardiac: RRR, no m/r/g appreciated.   Respiratory: CTAB, no wheezes, rhonchi or crackles appreciated.  GI: NABS, NT/ND, no guarding or rebound  Extremities: No LE edema, pulses DP 2+, radial pulses 2+   Skin: No acute lesions appreciated  Neuro: Patient intubated and sedated     Imaging   CXR     Impression:   1. The endotracheal tube with slightly advanced from 3 cm above the  coleman 2.5 cm.  2. Stable appearance of bilateral nodular opacities from earlier study  dated same day.

## 2017-12-20 ENCOUNTER — APPOINTMENT (OUTPATIENT)
Dept: GENERAL RADIOLOGY | Facility: CLINIC | Age: 47
DRG: 917 | End: 2017-12-20
Attending: INTERNAL MEDICINE
Payer: COMMERCIAL

## 2017-12-20 ENCOUNTER — APPOINTMENT (OUTPATIENT)
Dept: CT IMAGING | Facility: CLINIC | Age: 47
DRG: 917 | End: 2017-12-20
Attending: SURGERY
Payer: COMMERCIAL

## 2017-12-20 ENCOUNTER — APPOINTMENT (OUTPATIENT)
Dept: ULTRASOUND IMAGING | Facility: CLINIC | Age: 47
DRG: 917 | End: 2017-12-20
Attending: SURGERY
Payer: COMMERCIAL

## 2017-12-20 ENCOUNTER — APPOINTMENT (OUTPATIENT)
Dept: GENERAL RADIOLOGY | Facility: CLINIC | Age: 47
DRG: 917 | End: 2017-12-20
Attending: SURGERY
Payer: COMMERCIAL

## 2017-12-20 LAB
ABO + RH BLD: NORMAL
ABO + RH BLD: NORMAL
ALBUMIN SERPL-MCNC: 1.9 G/DL (ref 3.4–5)
ALBUMIN UR-MCNC: NEGATIVE MG/DL
ALP SERPL-CCNC: 224 U/L (ref 40–150)
ALT SERPL W P-5'-P-CCNC: 35 U/L (ref 0–50)
ANION GAP SERPL CALCULATED.3IONS-SCNC: 7 MMOL/L (ref 3–14)
ANISOCYTOSIS BLD QL SMEAR: ABNORMAL
APPEARANCE UR: CLEAR
AST SERPL W P-5'-P-CCNC: 121 U/L (ref 0–45)
BASE DEFICIT BLDV-SCNC: NORMAL MMOL/L
BASE EXCESS BLDA CALC-SCNC: 11.3 MMOL/L
BASE EXCESS BLDA CALC-SCNC: 12.1 MMOL/L
BASE EXCESS BLDA CALC-SCNC: 12.6 MMOL/L
BASE EXCESS BLDA CALC-SCNC: 13.7 MMOL/L
BASE EXCESS BLDA CALC-SCNC: 15.1 MMOL/L
BASE EXCESS BLDA CALC-SCNC: 17.2 MMOL/L
BASE EXCESS BLDV CALC-SCNC: NORMAL MMOL/L
BASO STIPL BLD QL SMEAR: PRESENT
BASOPHILS # BLD AUTO: 0 10E9/L (ref 0–0.2)
BASOPHILS NFR BLD AUTO: 0 %
BILIRUB SERPL-MCNC: 3.3 MG/DL (ref 0.2–1.3)
BILIRUB UR QL STRIP: NEGATIVE
BLD GP AB SCN SERPL QL: NORMAL
BLD PROD TYP BPU: NORMAL
BLD PROD TYP BPU: NORMAL
BLD UNIT ID BPU: 0
BLOOD BANK CMNT PATIENT-IMP: NORMAL
BLOOD PRODUCT CODE: NORMAL
BPU ID: NORMAL
BUN SERPL-MCNC: 27 MG/DL (ref 7–30)
CA-I BLD-MCNC: 3.8 MG/DL (ref 4.4–5.2)
CALCIUM SERPL-MCNC: 7.5 MG/DL (ref 8.5–10.1)
CHLORIDE SERPL-SCNC: 100 MMOL/L (ref 94–109)
CO2 SERPL-SCNC: 36 MMOL/L (ref 20–32)
COLOR UR AUTO: YELLOW
CREAT SERPL-MCNC: 1.36 MG/DL (ref 0.52–1.04)
DIFFERENTIAL METHOD BLD: ABNORMAL
EOSINOPHIL # BLD AUTO: 0 10E9/L (ref 0–0.7)
EOSINOPHIL NFR BLD AUTO: 0 %
ERYTHROCYTE [DISTWIDTH] IN BLOOD BY AUTOMATED COUNT: 18 % (ref 10–15)
ERYTHROCYTE [DISTWIDTH] IN BLOOD BY AUTOMATED COUNT: 19.1 % (ref 10–15)
GFR SERPL CREATININE-BSD FRML MDRD: 42 ML/MIN/1.7M2
GLUCOSE BLDC GLUCOMTR-MCNC: 105 MG/DL (ref 70–99)
GLUCOSE BLDC GLUCOMTR-MCNC: 11 MG/DL (ref 70–99)
GLUCOSE BLDC GLUCOMTR-MCNC: 119 MG/DL (ref 70–99)
GLUCOSE BLDC GLUCOMTR-MCNC: 120 MG/DL (ref 70–99)
GLUCOSE BLDC GLUCOMTR-MCNC: 130 MG/DL (ref 70–99)
GLUCOSE BLDC GLUCOMTR-MCNC: 136 MG/DL (ref 70–99)
GLUCOSE BLDC GLUCOMTR-MCNC: 145 MG/DL (ref 70–99)
GLUCOSE BLDC GLUCOMTR-MCNC: 145 MG/DL (ref 70–99)
GLUCOSE BLDC GLUCOMTR-MCNC: 146 MG/DL (ref 70–99)
GLUCOSE BLDC GLUCOMTR-MCNC: 155 MG/DL (ref 70–99)
GLUCOSE BLDC GLUCOMTR-MCNC: 160 MG/DL (ref 70–99)
GLUCOSE BLDC GLUCOMTR-MCNC: 160 MG/DL (ref 70–99)
GLUCOSE BLDC GLUCOMTR-MCNC: 161 MG/DL (ref 70–99)
GLUCOSE BLDC GLUCOMTR-MCNC: 161 MG/DL (ref 70–99)
GLUCOSE BLDC GLUCOMTR-MCNC: 163 MG/DL (ref 70–99)
GLUCOSE BLDC GLUCOMTR-MCNC: 167 MG/DL (ref 70–99)
GLUCOSE BLDC GLUCOMTR-MCNC: 171 MG/DL (ref 70–99)
GLUCOSE BLDC GLUCOMTR-MCNC: 174 MG/DL (ref 70–99)
GLUCOSE BLDC GLUCOMTR-MCNC: 174 MG/DL (ref 70–99)
GLUCOSE BLDC GLUCOMTR-MCNC: 176 MG/DL (ref 70–99)
GLUCOSE BLDC GLUCOMTR-MCNC: 178 MG/DL (ref 70–99)
GLUCOSE BLDC GLUCOMTR-MCNC: 188 MG/DL (ref 70–99)
GLUCOSE BLDC GLUCOMTR-MCNC: 197 MG/DL (ref 70–99)
GLUCOSE BLDC GLUCOMTR-MCNC: 199 MG/DL (ref 70–99)
GLUCOSE BLDC GLUCOMTR-MCNC: 200 MG/DL (ref 70–99)
GLUCOSE BLDC GLUCOMTR-MCNC: 227 MG/DL (ref 70–99)
GLUCOSE BLDC GLUCOMTR-MCNC: 67 MG/DL (ref 70–99)
GLUCOSE SERPL-MCNC: 151 MG/DL (ref 70–99)
GLUCOSE UR STRIP-MCNC: NEGATIVE MG/DL
HCO3 BLD-SCNC: 37 MMOL/L (ref 21–28)
HCO3 BLD-SCNC: 37 MMOL/L (ref 21–28)
HCO3 BLD-SCNC: 38 MMOL/L (ref 21–28)
HCO3 BLD-SCNC: 39 MMOL/L (ref 21–28)
HCO3 BLD-SCNC: 42 MMOL/L (ref 21–28)
HCO3 BLD-SCNC: 43 MMOL/L (ref 21–28)
HCO3 BLDV-SCNC: NORMAL MMOL/L (ref 21–28)
HCT VFR BLD AUTO: 20.9 % (ref 35–47)
HCT VFR BLD AUTO: 26.5 % (ref 35–47)
HGB BLD-MCNC: 6.9 G/DL (ref 11.7–15.7)
HGB BLD-MCNC: 8.5 G/DL (ref 11.7–15.7)
HGB BLD-MCNC: 8.8 G/DL (ref 11.7–15.7)
HGB UR QL STRIP: ABNORMAL
HYALINE CASTS #/AREA URNS LPF: 1 /LPF (ref 0–2)
KETONES UR STRIP-MCNC: NEGATIVE MG/DL
LACTATE BLD-SCNC: 2.2 MMOL/L (ref 0.7–2)
LACTATE BLD-SCNC: 3.2 MMOL/L (ref 0.7–2)
LACTATE BLD-SCNC: 3.7 MMOL/L (ref 0.7–2)
LACTATE BLD-SCNC: NORMAL MMOL/L (ref 0.7–2)
LEUKOCYTE ESTERASE UR QL STRIP: NEGATIVE
LYMPHOCYTES # BLD AUTO: 1.6 10E9/L (ref 0.8–5.3)
LYMPHOCYTES NFR BLD AUTO: 7.2 %
MAGNESIUM SERPL-MCNC: 2.1 MG/DL (ref 1.6–2.3)
MAGNESIUM SERPL-MCNC: 2.2 MG/DL (ref 1.6–2.3)
MAGNESIUM SERPL-MCNC: 2.3 MG/DL (ref 1.6–2.3)
MAGNESIUM SERPL-MCNC: 2.4 MG/DL (ref 1.6–2.3)
MAGNESIUM SERPL-MCNC: 2.5 MG/DL (ref 1.6–2.3)
MAGNESIUM SERPL-MCNC: 2.7 MG/DL (ref 1.6–2.3)
MCH RBC QN AUTO: 30 PG (ref 26.5–33)
MCH RBC QN AUTO: 31.2 PG (ref 26.5–33)
MCHC RBC AUTO-ENTMCNC: 33 G/DL (ref 31.5–36.5)
MCHC RBC AUTO-ENTMCNC: 33.2 G/DL (ref 31.5–36.5)
MCV RBC AUTO: 90 FL (ref 78–100)
MCV RBC AUTO: 95 FL (ref 78–100)
METAMYELOCYTES # BLD: 0.8 10E9/L
METAMYELOCYTES NFR BLD MANUAL: 3.6 %
MONOCYTES # BLD AUTO: 0.6 10E9/L (ref 0–1.3)
MONOCYTES NFR BLD AUTO: 2.7 %
MUCOUS THREADS #/AREA URNS LPF: PRESENT /LPF
MYELOCYTES # BLD: 2 10E9/L
MYELOCYTES NFR BLD MANUAL: 9 %
NEUTROPHILS # BLD AUTO: 15.8 10E9/L (ref 1.6–8.3)
NEUTROPHILS NFR BLD AUTO: 73 %
NITRATE UR QL: NEGATIVE
NRBC # BLD AUTO: 0.8 10*3/UL
NRBC BLD AUTO-RTO: 4 /100
NUM BPU REQUESTED: 2
O2/TOTAL GAS SETTING VFR VENT: 40 %
O2/TOTAL GAS SETTING VFR VENT: 50 %
O2/TOTAL GAS SETTING VFR VENT: NORMAL %
OXYHGB MFR BLD: 96 % (ref 92–100)
OXYHGB MFR BLDV: NORMAL %
PCO2 BLD: 51 MM HG (ref 35–45)
PCO2 BLD: 55 MM HG (ref 35–45)
PCO2 BLD: 55 MM HG (ref 35–45)
PCO2 BLD: 57 MM HG (ref 35–45)
PCO2 BLD: 64 MM HG (ref 35–45)
PCO2 BLD: 67 MM HG (ref 35–45)
PCO2 BLDV: NORMAL MM HG (ref 40–50)
PH BLD: 7.4 PH (ref 7.35–7.45)
PH BLD: 7.43 PH (ref 7.35–7.45)
PH BLD: 7.44 PH (ref 7.35–7.45)
PH BLD: 7.45 PH (ref 7.35–7.45)
PH BLD: 7.45 PH (ref 7.35–7.45)
PH BLD: 7.47 PH (ref 7.35–7.45)
PH BLDV: NORMAL PH (ref 7.32–7.43)
PH UR STRIP: 5 PH (ref 5–7)
PHOSPHATE SERPL-MCNC: 1.9 MG/DL (ref 2.5–4.5)
PHOSPHATE SERPL-MCNC: 2.2 MG/DL (ref 2.5–4.5)
PHOSPHATE SERPL-MCNC: 2.4 MG/DL (ref 2.5–4.5)
PHOSPHATE SERPL-MCNC: 3.3 MG/DL (ref 2.5–4.5)
PHOSPHATE SERPL-MCNC: 3.6 MG/DL (ref 2.5–4.5)
PHOSPHATE SERPL-MCNC: 4 MG/DL (ref 2.5–4.5)
PLATELET # BLD AUTO: 101 10E9/L (ref 150–450)
PLATELET # BLD AUTO: 80 10E9/L (ref 150–450)
PLATELET # BLD EST: ABNORMAL 10*3/UL
PO2 BLD: 121 MM HG (ref 80–105)
PO2 BLD: 71 MM HG (ref 80–105)
PO2 BLD: 74 MM HG (ref 80–105)
PO2 BLD: 75 MM HG (ref 80–105)
PO2 BLD: 75 MM HG (ref 80–105)
PO2 BLD: 79 MM HG (ref 80–105)
PO2 BLDV: NORMAL MM HG (ref 25–47)
POTASSIUM SERPL-SCNC: 3.3 MMOL/L (ref 3.4–5.3)
POTASSIUM SERPL-SCNC: 3.4 MMOL/L (ref 3.4–5.3)
POTASSIUM SERPL-SCNC: 3.5 MMOL/L (ref 3.4–5.3)
PROMYELOCYTES # BLD MANUAL: 1 10E9/L
PROMYELOCYTES NFR BLD MANUAL: 4.5 %
PROT SERPL-MCNC: 4.1 G/DL (ref 6.8–8.8)
RBC # BLD AUTO: 2.21 10E12/L (ref 3.8–5.2)
RBC # BLD AUTO: 2.93 10E12/L (ref 3.8–5.2)
RBC #/AREA URNS AUTO: 4 /HPF (ref 0–2)
SODIUM SERPL-SCNC: 143 MMOL/L (ref 133–144)
SOURCE: ABNORMAL
SP GR UR STRIP: 1 (ref 1–1.03)
SPECIMEN EXP DATE BLD: NORMAL
SQUAMOUS #/AREA URNS AUTO: <1 /HPF (ref 0–1)
TRANS CELLS #/AREA URNS HPF: 1 /HPF (ref 0–1)
TRANSFUSION STATUS PATIENT QL: NORMAL
TRANSFUSION STATUS PATIENT QL: NORMAL
TRIGL SERPL-MCNC: 145 MG/DL
UROBILINOGEN UR STRIP-MCNC: NORMAL MG/DL (ref 0–2)
WBC # BLD AUTO: 21.7 10E9/L (ref 4–11)
WBC # BLD AUTO: 26 10E9/L (ref 4–11)
WBC #/AREA URNS AUTO: 4 /HPF (ref 0–2)

## 2017-12-20 PROCEDURE — 25000128 H RX IP 250 OP 636: Performed by: STUDENT IN AN ORGANIZED HEALTH CARE EDUCATION/TRAINING PROGRAM

## 2017-12-20 PROCEDURE — 82803 BLOOD GASES ANY COMBINATION: CPT | Performed by: INTERNAL MEDICINE

## 2017-12-20 PROCEDURE — 93010 ELECTROCARDIOGRAM REPORT: CPT | Performed by: INTERNAL MEDICINE

## 2017-12-20 PROCEDURE — 20000004 ZZH R&B ICU UMMC

## 2017-12-20 PROCEDURE — 84145 PROCALCITONIN (PCT): CPT | Performed by: INTERNAL MEDICINE

## 2017-12-20 PROCEDURE — 25000125 ZZHC RX 250: Performed by: STUDENT IN AN ORGANIZED HEALTH CARE EDUCATION/TRAINING PROGRAM

## 2017-12-20 PROCEDURE — 71010 XR CHEST PORT 1 VW: CPT

## 2017-12-20 PROCEDURE — 83605 ASSAY OF LACTIC ACID: CPT | Performed by: INTERNAL MEDICINE

## 2017-12-20 PROCEDURE — 70450 CT HEAD/BRAIN W/O DYE: CPT

## 2017-12-20 PROCEDURE — 25000125 ZZHC RX 250: Performed by: INTERNAL MEDICINE

## 2017-12-20 PROCEDURE — 40000014 ZZH STATISTIC ARTERIAL MONITORING DAILY

## 2017-12-20 PROCEDURE — 25000132 ZZH RX MED GY IP 250 OP 250 PS 637: Performed by: INTERNAL MEDICINE

## 2017-12-20 PROCEDURE — 76705 ECHO EXAM OF ABDOMEN: CPT

## 2017-12-20 PROCEDURE — 27210197 ZZH KIT POWER PICC TRIPLE LUMEN

## 2017-12-20 PROCEDURE — 87040 BLOOD CULTURE FOR BACTERIA: CPT | Performed by: STUDENT IN AN ORGANIZED HEALTH CARE EDUCATION/TRAINING PROGRAM

## 2017-12-20 PROCEDURE — 25800025 ZZH RX 258

## 2017-12-20 PROCEDURE — P9041 ALBUMIN (HUMAN),5%, 50ML: HCPCS | Performed by: STUDENT IN AN ORGANIZED HEALTH CARE EDUCATION/TRAINING PROGRAM

## 2017-12-20 PROCEDURE — 85025 COMPLETE CBC W/AUTO DIFF WBC: CPT | Performed by: INTERNAL MEDICINE

## 2017-12-20 PROCEDURE — 00000146 ZZHCL STATISTIC GLUCOSE BY METER IP

## 2017-12-20 PROCEDURE — 84100 ASSAY OF PHOSPHORUS: CPT | Performed by: INTERNAL MEDICINE

## 2017-12-20 PROCEDURE — 36569 INSJ PICC 5 YR+ W/O IMAGING: CPT

## 2017-12-20 PROCEDURE — 25000128 H RX IP 250 OP 636: Performed by: INTERNAL MEDICINE

## 2017-12-20 PROCEDURE — 99291 CRITICAL CARE FIRST HOUR: CPT | Mod: GC | Performed by: INTERNAL MEDICINE

## 2017-12-20 PROCEDURE — 80053 COMPREHEN METABOLIC PANEL: CPT | Performed by: INTERNAL MEDICINE

## 2017-12-20 PROCEDURE — 94640 AIRWAY INHALATION TREATMENT: CPT | Mod: 76

## 2017-12-20 PROCEDURE — 40000986 XR CHEST PORT 1 VW

## 2017-12-20 PROCEDURE — 25000132 ZZH RX MED GY IP 250 OP 250 PS 637: Performed by: STUDENT IN AN ORGANIZED HEALTH CARE EDUCATION/TRAINING PROGRAM

## 2017-12-20 PROCEDURE — 94645 CONT INHLJ TX EACH ADDL HOUR: CPT

## 2017-12-20 PROCEDURE — 84132 ASSAY OF SERUM POTASSIUM: CPT | Performed by: INTERNAL MEDICINE

## 2017-12-20 PROCEDURE — 27210995 ZZH RX 272: Performed by: INTERNAL MEDICINE

## 2017-12-20 PROCEDURE — 81001 URINALYSIS AUTO W/SCOPE: CPT | Performed by: STUDENT IN AN ORGANIZED HEALTH CARE EDUCATION/TRAINING PROGRAM

## 2017-12-20 PROCEDURE — 93005 ELECTROCARDIOGRAM TRACING: CPT

## 2017-12-20 PROCEDURE — 94003 VENT MGMT INPAT SUBQ DAY: CPT

## 2017-12-20 PROCEDURE — 85018 HEMOGLOBIN: CPT | Performed by: INTERNAL MEDICINE

## 2017-12-20 PROCEDURE — 25800025 ZZH RX 258: Performed by: INTERNAL MEDICINE

## 2017-12-20 PROCEDURE — P9016 RBC LEUKOCYTES REDUCED: HCPCS | Performed by: STUDENT IN AN ORGANIZED HEALTH CARE EDUCATION/TRAINING PROGRAM

## 2017-12-20 PROCEDURE — 27210913 ZZH NUTRITION PRODUCT INTERMEDIATE PACKET

## 2017-12-20 PROCEDURE — 82330 ASSAY OF CALCIUM: CPT | Performed by: INTERNAL MEDICINE

## 2017-12-20 PROCEDURE — 40000275 ZZH STATISTIC RCP TIME EA 10 MIN

## 2017-12-20 PROCEDURE — 82805 BLOOD GASES W/O2 SATURATION: CPT | Performed by: INTERNAL MEDICINE

## 2017-12-20 PROCEDURE — 85027 COMPLETE CBC AUTOMATED: CPT | Performed by: STUDENT IN AN ORGANIZED HEALTH CARE EDUCATION/TRAINING PROGRAM

## 2017-12-20 PROCEDURE — 27210429 ZZH NUTRITION PRODUCT INTERMEDIATE LITER

## 2017-12-20 PROCEDURE — 83735 ASSAY OF MAGNESIUM: CPT | Performed by: INTERNAL MEDICINE

## 2017-12-20 PROCEDURE — 84478 ASSAY OF TRIGLYCERIDES: CPT | Performed by: INTERNAL MEDICINE

## 2017-12-20 RX ORDER — POTASSIUM CHLORIDE 20MEQ/15ML
10 LIQUID (ML) ORAL
Status: DISCONTINUED | OUTPATIENT
Start: 2017-12-20 | End: 2017-12-20 | Stop reason: ALTCHOICE

## 2017-12-20 RX ORDER — DEXTROSE 50 G/100ML
INJECTION, SOLUTION INTRAVENOUS CONTINUOUS
Status: DISCONTINUED | OUTPATIENT
Start: 2017-12-20 | End: 2017-12-20 | Stop reason: CLARIF

## 2017-12-20 RX ORDER — HEPARIN SODIUM,PORCINE 10 UNIT/ML
2-5 VIAL (ML) INTRAVENOUS
Status: COMPLETED | OUTPATIENT
Start: 2017-12-20 | End: 2018-01-03

## 2017-12-20 RX ORDER — HEPARIN SODIUM,PORCINE 10 UNIT/ML
5-10 VIAL (ML) INTRAVENOUS
Status: DISCONTINUED | OUTPATIENT
Start: 2017-12-20 | End: 2018-01-05 | Stop reason: HOSPADM

## 2017-12-20 RX ORDER — METOLAZONE 2.5 MG/1
10 TABLET ORAL DAILY
Status: COMPLETED | OUTPATIENT
Start: 2017-12-20 | End: 2017-12-20

## 2017-12-20 RX ORDER — CEFEPIME 1 G/50ML
2 INJECTION, SOLUTION INTRAVENOUS EVERY 8 HOURS
Status: DISCONTINUED | OUTPATIENT
Start: 2017-12-20 | End: 2017-12-20

## 2017-12-20 RX ORDER — SODIUM CHLORIDE 9 MG/ML
INJECTION, SOLUTION INTRAVENOUS
Status: DISCONTINUED
Start: 2017-12-20 | End: 2017-12-21 | Stop reason: HOSPADM

## 2017-12-20 RX ORDER — POTASSIUM CHLORIDE 1.5 G/1.58G
40 POWDER, FOR SOLUTION ORAL 2 TIMES DAILY
Status: DISCONTINUED | OUTPATIENT
Start: 2017-12-20 | End: 2017-12-22

## 2017-12-20 RX ORDER — LIDOCAINE 40 MG/G
CREAM TOPICAL
Status: DISCONTINUED | OUTPATIENT
Start: 2017-12-20 | End: 2018-01-05 | Stop reason: HOSPADM

## 2017-12-20 RX ORDER — ALBUMIN, HUMAN INJ 5% 5 %
25 SOLUTION INTRAVENOUS ONCE
Status: COMPLETED | OUTPATIENT
Start: 2017-12-20 | End: 2017-12-20

## 2017-12-20 RX ORDER — LEVOFLOXACIN 5 MG/ML
500 INJECTION, SOLUTION INTRAVENOUS ONCE
Status: COMPLETED | OUTPATIENT
Start: 2017-12-20 | End: 2017-12-20

## 2017-12-20 RX ORDER — DEXTROSE MONOHYDRATE 25 G/50ML
50 INJECTION, SOLUTION INTRAVENOUS ONCE
Status: DISCONTINUED | OUTPATIENT
Start: 2017-12-20 | End: 2017-12-22

## 2017-12-20 RX ORDER — MEROPENEM 1 G/1
1 INJECTION, POWDER, FOR SOLUTION INTRAVENOUS EVERY 6 HOURS
Status: DISCONTINUED | OUTPATIENT
Start: 2017-12-20 | End: 2017-12-20 | Stop reason: ALTCHOICE

## 2017-12-20 RX ORDER — HEPARIN SODIUM,PORCINE 10 UNIT/ML
5-10 VIAL (ML) INTRAVENOUS EVERY 24 HOURS
Status: DISCONTINUED | OUTPATIENT
Start: 2017-12-20 | End: 2017-12-31

## 2017-12-20 RX ORDER — AMINO ACIDS/PROTEIN HYDROLYS 11G-40/45
1 LIQUID IN PACKET (ML) ORAL 2 TIMES DAILY
Status: DISCONTINUED | OUTPATIENT
Start: 2017-12-20 | End: 2017-12-27 | Stop reason: CLARIF

## 2017-12-20 RX ORDER — ALBUMIN (HUMAN) 12.5 G/50ML
50 SOLUTION INTRAVENOUS ONCE
Status: COMPLETED | OUTPATIENT
Start: 2017-12-20 | End: 2017-12-21

## 2017-12-20 RX ADMIN — FOLIC ACID 1 MG: 5 INJECTION, SOLUTION INTRAMUSCULAR; INTRAVENOUS; SUBCUTANEOUS at 07:43

## 2017-12-20 RX ADMIN — Medication 10 MG/HR: at 07:59

## 2017-12-20 RX ADMIN — METHYLPREDNISOLONE 40 MG: 40 INJECTION, POWDER, LYOPHILIZED, FOR SOLUTION INTRAMUSCULAR; INTRAVENOUS at 14:42

## 2017-12-20 RX ADMIN — FUROSEMIDE 15 MG/HR: 10 INJECTION, SOLUTION INTRAVENOUS at 10:45

## 2017-12-20 RX ADMIN — MIDAZOLAM 4 MG: 1 INJECTION INTRAMUSCULAR; INTRAVENOUS at 14:46

## 2017-12-20 RX ADMIN — POTASSIUM CHLORIDE 40 MEQ: 1.5 POWDER, FOR SOLUTION ORAL at 11:35

## 2017-12-20 RX ADMIN — POTASSIUM PHOSPHATE, MONOBASIC AND POTASSIUM PHOSPHATE, DIBASIC 20 MMOL: 224; 236 INJECTION, SOLUTION INTRAVENOUS at 01:10

## 2017-12-20 RX ADMIN — Medication 3 MG: at 20:53

## 2017-12-20 RX ADMIN — LACTULOSE 20 G: 20 SOLUTION ORAL at 13:44

## 2017-12-20 RX ADMIN — IPRATROPIUM BROMIDE AND ALBUTEROL SULFATE 3 ML: .5; 3 SOLUTION RESPIRATORY (INHALATION) at 17:18

## 2017-12-20 RX ADMIN — MINERAL OIL AND WHITE PETROLATUM: 150; 830 OINTMENT OPHTHALMIC at 06:33

## 2017-12-20 RX ADMIN — IPRATROPIUM BROMIDE AND ALBUTEROL SULFATE 3 ML: .5; 3 SOLUTION RESPIRATORY (INHALATION) at 12:12

## 2017-12-20 RX ADMIN — BISACODYL 10 MG: 10 SUPPOSITORY RECTAL at 07:41

## 2017-12-20 RX ADMIN — EPOPROSTENOL 20 NG/KG/MIN: 1.5 INJECTION, POWDER, LYOPHILIZED, FOR SOLUTION INTRAVENOUS at 22:32

## 2017-12-20 RX ADMIN — ALBUMIN HUMAN 25 G: 0.05 INJECTION, SOLUTION INTRAVENOUS at 06:33

## 2017-12-20 RX ADMIN — IPRATROPIUM BROMIDE AND ALBUTEROL SULFATE 3 ML: .5; 3 SOLUTION RESPIRATORY (INHALATION) at 08:19

## 2017-12-20 RX ADMIN — Medication 3 MG: at 13:46

## 2017-12-20 RX ADMIN — POTASSIUM CHLORIDE 20 MEQ: 400 INJECTION, SOLUTION INTRAVENOUS at 07:02

## 2017-12-20 RX ADMIN — MAGNESIUM SULFATE IN DEXTROSE 1 G: 10 INJECTION, SOLUTION INTRAVENOUS at 21:33

## 2017-12-20 RX ADMIN — MINERAL OIL AND WHITE PETROLATUM: 150; 830 OINTMENT OPHTHALMIC at 22:34

## 2017-12-20 RX ADMIN — DEXTROSE MONOHYDRATE: 70 INJECTION, SOLUTION INTRAVENOUS at 00:10

## 2017-12-20 RX ADMIN — LACTULOSE 20 G: 20 SOLUTION ORAL at 07:41

## 2017-12-20 RX ADMIN — THIAMINE HYDROCHLORIDE 100 MG: 100 INJECTION, SOLUTION INTRAMUSCULAR; INTRAVENOUS at 07:42

## 2017-12-20 RX ADMIN — METRONIDAZOLE 500 MG: 500 INJECTION, SOLUTION INTRAVENOUS at 09:55

## 2017-12-20 RX ADMIN — LACTULOSE 20 G: 20 SOLUTION ORAL at 20:52

## 2017-12-20 RX ADMIN — POTASSIUM CHLORIDE 20 MEQ: 1.5 POWDER, FOR SOLUTION ORAL at 20:52

## 2017-12-20 RX ADMIN — METHYLPREDNISOLONE 40 MG: 40 INJECTION, POWDER, LYOPHILIZED, FOR SOLUTION INTRAMUSCULAR; INTRAVENOUS at 22:49

## 2017-12-20 RX ADMIN — Medication 3 MG: at 07:42

## 2017-12-20 RX ADMIN — METOLAZONE 10 MG: 2.5 TABLET ORAL at 08:09

## 2017-12-20 RX ADMIN — SALINE NASAL SPRAY 1 SPRAY: 1.5 SOLUTION NASAL at 11:57

## 2017-12-20 RX ADMIN — EPOPROSTENOL 20 NG/KG/MIN: 1.5 INJECTION, POWDER, LYOPHILIZED, FOR SOLUTION INTRAVENOUS at 03:11

## 2017-12-20 RX ADMIN — DEXTROSE MONOHYDRATE 50 ML: 500 INJECTION PARENTERAL at 00:56

## 2017-12-20 RX ADMIN — MAGNESIUM SULFATE IN DEXTROSE 1 G: 10 INJECTION, SOLUTION INTRAVENOUS at 08:24

## 2017-12-20 RX ADMIN — POTASSIUM CHLORIDE 20 MEQ: 400 INJECTION, SOLUTION INTRAVENOUS at 05:59

## 2017-12-20 RX ADMIN — MULTIVITAMIN 15 ML: LIQUID ORAL at 20:54

## 2017-12-20 RX ADMIN — LIDOCAINE HYDROCHLORIDE 3 ML: 10 INJECTION, SOLUTION EPIDURAL; INFILTRATION; INTRACAUDAL; PERINEURAL at 16:28

## 2017-12-20 RX ADMIN — PROPOFOL 20 MCG/KG/MIN: 10 INJECTION, EMULSION INTRAVENOUS at 12:01

## 2017-12-20 RX ADMIN — Medication 150 MCG/HR: at 23:09

## 2017-12-20 RX ADMIN — EPOPROSTENOL 20 NG/KG/MIN: 1.5 INJECTION, POWDER, LYOPHILIZED, FOR SOLUTION INTRAVENOUS at 16:23

## 2017-12-20 RX ADMIN — PANTOPRAZOLE SODIUM 40 MG: 40 INJECTION, POWDER, FOR SOLUTION INTRAVENOUS at 07:42

## 2017-12-20 RX ADMIN — Medication 1 PACKET: at 20:53

## 2017-12-20 RX ADMIN — MINERAL OIL AND WHITE PETROLATUM: 150; 830 OINTMENT OPHTHALMIC at 13:46

## 2017-12-20 RX ADMIN — EPOPROSTENOL 20 NG/KG/MIN: 1.5 INJECTION, POWDER, LYOPHILIZED, FOR SOLUTION INTRAVENOUS at 09:48

## 2017-12-20 RX ADMIN — METHYLPREDNISOLONE 40 MG: 40 INJECTION, POWDER, LYOPHILIZED, FOR SOLUTION INTRAMUSCULAR; INTRAVENOUS at 05:55

## 2017-12-20 RX ADMIN — IPRATROPIUM BROMIDE AND ALBUTEROL SULFATE 3 ML: .5; 3 SOLUTION RESPIRATORY (INHALATION) at 21:16

## 2017-12-20 RX ADMIN — LEVOTHYROXINE SODIUM ANHYDROUS 25 MCG: 100 INJECTION, POWDER, LYOPHILIZED, FOR SOLUTION INTRAVENOUS at 07:43

## 2017-12-20 RX ADMIN — Medication 150 MCG/HR: at 13:44

## 2017-12-20 RX ADMIN — DEXTROSE MONOHYDRATE 5 MCG/KG/MIN: 5 INJECTION, SOLUTION INTRAVENOUS at 08:10

## 2017-12-20 RX ADMIN — CALCIUM GLUCONATE 4 G: 98 INJECTION, SOLUTION INTRAVENOUS at 17:42

## 2017-12-20 RX ADMIN — Medication 20 MG: at 22:24

## 2017-12-20 RX ADMIN — THIAMINE HYDROCHLORIDE 100 MG: 100 INJECTION, SOLUTION INTRAMUSCULAR; INTRAVENOUS at 20:52

## 2017-12-20 RX ADMIN — POTASSIUM PHOSPHATE, MONOBASIC AND POTASSIUM PHOSPHATE, DIBASIC 15 MMOL: 224; 236 INJECTION, SOLUTION INTRAVENOUS at 10:51

## 2017-12-20 RX ADMIN — ENOXAPARIN SODIUM 40 MG: 40 INJECTION SUBCUTANEOUS at 10:53

## 2017-12-20 RX ADMIN — DEXTROSE MONOHYDRATE 50 ML: 500 INJECTION PARENTERAL at 00:09

## 2017-12-20 RX ADMIN — Medication 10 MG/HR: at 17:42

## 2017-12-20 RX ADMIN — LEVOFLOXACIN 500 MG: 5 INJECTION, SOLUTION INTRAVENOUS at 14:42

## 2017-12-20 RX ADMIN — Medication 20 MG: at 14:22

## 2017-12-20 RX ADMIN — Medication 150 MCG/HR: at 04:30

## 2017-12-20 RX ADMIN — PROPOFOL 20 MCG/KG/MIN: 10 INJECTION, EMULSION INTRAVENOUS at 02:52

## 2017-12-20 RX ADMIN — SALINE NASAL SPRAY 1 SPRAY: 1.5 SOLUTION NASAL at 13:59

## 2017-12-20 RX ADMIN — POTASSIUM CHLORIDE 40 MEQ: 1.5 POWDER, FOR SOLUTION ORAL at 16:25

## 2017-12-20 RX ADMIN — FENTANYL CITRATE 50 MCG: 50 INJECTION, SOLUTION INTRAMUSCULAR; INTRAVENOUS at 22:31

## 2017-12-20 RX ADMIN — FUROSEMIDE 10 MG/HR: 10 INJECTION, SOLUTION INTRAVENOUS at 01:30

## 2017-12-20 RX ADMIN — FUROSEMIDE 15 MG/HR: 10 INJECTION, SOLUTION INTRAVENOUS at 17:20

## 2017-12-20 RX ADMIN — DEXTROSE MONOHYDRATE 5 MCG/KG/MIN: 5 INJECTION, SOLUTION INTRAVENOUS at 20:54

## 2017-12-20 NOTE — PHARMACY-CONSULT NOTE
Pharmacy Tube Feeding Consult    Medication reviewed for administration by feeding tube and for potential food/drug interactions.    Recommendation: No changes are needed at this time.     Pharmacy will continue to follow as new medications are ordered.    Marian Bhardwaj, JaclynD

## 2017-12-20 NOTE — PROGRESS NOTES
D/A:  Paralyzed and sedated   Continues on Fentanyl, versed, propofol, cisatracurium  TOF 0-3/4, positional  CMV( 40-50%, P 12, RR 33, 410), very scant ET secretions  Moderate oral/nasal secretions  Prone till 0520, then supinated   Last ABG 7.45,55, 71, 38  's-120's, Tmax 101.3, MAPs maintained > 65  D50 stopped briefly, BGs dropped to 10's  K 3.3, Cr 1.36, Mag 2.2, Phos 2.2, LA 3.2, Hgb 6.9, WBC 21.7, Plt 80  OG intact to LIS, clamped with medication, minimal output, No BM overnight  Napoles intact with continuous output, on lasix gtt      I: FiO2 titrated from 50 to 40%, maintained PaO2 in the 70's  One-time bump of versed/fentanyl for 's, slightly effective  Blood cultures, UA, sputum ordered for fevers  D50 IV push given X2, and gtt re-ordered  K replaced X2, Phos X1  5% albumin given for Lactic   1u PRBC ordered    P: Continue lytes replacement  Administer 1u PRBC  Continue to monitor ABGs  Continue POC

## 2017-12-20 NOTE — PROGRESS NOTES
CLINICAL NUTRITION SERVICES - BRIEF NOTE    Pt has an OGT for enteral access, MICU team presumes pt has sinusitis based on the drainage that is coming out of her nares. No plans for nasoenteral feeding access. Pt is to remain supine.    INTERVENTIONS  Recommendations / Nutrition Prescription  -Recommend feeding gastrically via OGT:  -TwoCal HN @ 15 mL/hr and adv by 10 mL q8h until @ goal rate of 35 mL/hr. TF at goal would provide 1680 kcals (27 kcals/kg/day), 71 g PRO (1.1 g/kg/day), 588 mL H2O, 184 g CHO and 4 g Fiber daily.  -1 packet ProSource BID to provide an additional 80 kcal and 22 g PRO to increase total provisions to 1760 kcal (28 kcal/kg) and 93 g PRO (1.5 g/kg)  -15 mL liquid MVI (Certavite) to help meet micronutrient needs    Implementation  Collaboration with other providers- Discussed on MICU rounds, awaiting TF consult (assess and order) so TF orders can be placed.    Ghislaine Montes, ELLIOT, LD  (MICU dietitian, pgr- [phone])

## 2017-12-20 NOTE — PROGRESS NOTES
Medical ICU Progress Note    Patient:  Ana Laura Wharton   Date of birth 1970, Medical record number [id number]  Date of Visit:  12/20/2017  Date of Admission: 12/13/2017    Ana Laura Wharton is a 46 yo female with hx of bipolar disorder with depression, polysubstance abuse, & prior suicide attempts, admitted to [facility] MICU  Following calcium channel blocker (CCB) overdose, tylenol overdose & alcohol overdose. Course complicated by lactic acidosis, hypoventilation, acute renal failure, & severe hypotension/vasogenic shock. Currently intubated, sedated, & proned.       Concise Assessment and Plan  # Suicide Attempt by acetaminophen, ETOH, and amlodipine  - Continue to monitor      # ARDS 2/2 aspiration  - Continue ARDSnet protocol  - Continued paralysis   - Continued Flolan      # Volume Overload   - Lasix 15 mg/hr and metolazone 10 mg qday     # Sepsis 2/2 Sinusitis  Levofloxacin for sinusitis      NEURO  Sedation/Analgesia:  Midazolam gtt   Fentanyl gtt   Propofol gtt       Paralytic: cisatracurium gtt d/c 12/16      Bipolar I disorder with anxiety & depression  Suicide attempt with polysubstance abuse (amlodipine, nyquil (acetaminophen), alcohol (vodka))  Completed poison control recommendations of calcium drip with goal of ical 10. Further, completed high dose insulin drip and will trend BPs.   - Manage OD as per below  - Will monitor for withdrawal symptoms once weaned from sedation (ETOH 0.17 at OSH). IV thiamine & folate  - Holding home zyprexa & remeron.  - Psych consult for OD when awake      Agitation: Patient at high risk of alcohol & benzo withdrawal  - Slow wean of sedation when medically appropriate      RESPIRATORY  Ventilation Mode: CMV/AC  FiO2 (%): 40 %  Rate Set (breaths/minute): 27 breaths/min  Tidal Volume Set (mL): 410 mL  PEEP (cm H2O): 13 cmH2O  Oxygen Concentration (%): 40 %  Resp: 27      ARDS 2/2 aspiration   Acute Hypoxic  Respiratory Failure:  Acute Hypercarbic Respiratory Failure  Due  initially to hypoventilation due to overdose, now due to V/Q mismatch in the setting of ARDS. ARDS net vent settings for lung protection   - Finished prone today, remains supine today   - Inhaled Flolan for increased oxygenation   - Vent settings as per above  - Titrate O2 to sats >90%, q4 ABG  - Solumedrol in the setting of recurrent hypercarbia and concern for air trapping in the setting of possible COPD.   - Duonebs for air trapping  - Diuresis with metolazone and lasix       CARDIOVASCULAR  Hypotension/Vasodilatory shock/Vasoplegia, Resolved:   Required pressors. Side effect of CCB overdose. ECHO shows normal EF of 70%. Normal RV function. She has been titrated off norepi and has been hemodynamically stable. Further, she has been titrated off her insulin and pressures have been stable.   - Continue stress dose steroids until post ARDS      Paroxysmal atrial fibrillation: Briefly in Afib with RVR this AM. Received metoprolol x 2. Converted to NSR. Will hopefully improve once fluid status improves  - No acute need for management at this time       Intermittent SVT  Patient has had SVT with rates in the 190s intermittently and spontaneously resolves. Hemodynamically stable during episodes of SVT  - Patient has been stable without receiving further medications     RENAL / FLUIDS / ELECTROLYTES  Baseline creatinine: 0.7-0.8.       Acute Renal Failure 2/2 calcium channel blocker overdose:   As high as 1.6.  Due to CCB overdose. Volume overload, pulmonary edema. Cr slowly improving with diuresis & forward flow  - Lasix gtt at 15 mg/hr  - Metolazone 10 mg NG tube   - I/Os, Monitor Cr, UOP  - Monitoring lytes, replace  - Goal of net negative 1.5-2 L qday      Lactic Acidosis/AGMA: Due to hypoperfusion. Lactate may continue to be elevated until after CCB has cleared  - Continue to trend  - Goal of pH>7.25, bicarb gtt as necessary      GASTROINTESTINAL    Acetaminophen overdose  Elevated Transaminases  Found next to bottle  of Nyquil. AST/ALTs now normal s/p NAC gtt.   - Trend LFTs, INR      OG: OG to LIS      Hx of C.diff: Hx of fecal transplant. Has had formed stool      Tube Feeds: Re-evaluate daily      INFECTIOUS DISEASE     Sepsis 2/2 Sinusitis  Patient has new fever and rising leukocytosis with worsening sinus infection with drainage at this time  - Initiation of Levofloxacin for sinusitis  - D/C ceftriaxone today       HEMATOLOGY   Anemia: Normocytic Anemia. Likely due to initial dilution & chronic illness. Haptoglobin and firbrinogen normal with LDH elevated. Hemolysis unlikely. Patient has elevated reticulocyte count, but not quite appropriately elevated   - Poor reticulocyte level in the setting of anemia  - Continue to trend hgb   - Tranfuse < 7      Thrombocytopenia: Unknown etiology. Does not appear to be HIT related. Stable at 100  - Monitor daily      INR: As high as 1.55 in the setting of overdose & liver injury. 1.29 today  - Monitor daily      ENDOCRINE  Insulin Drip for CCB  Monitoring q1 hour glucose as insulin drip recently stopped      Hypothyroidism: Levothyroxine IV, switch back to oral once able      SKIN CARE  No acute concerns      Prophylaxis: DVT: Lovenox: GI: PPI  Code status: Full  Lines: RIJ, RAL, PIV x3      Patient seen and discussed with Dr. Rogers who agrees with the assessment and plan    Raymon Pedro MD  PGY2  [phone]      Interval Events    No acute events overnight     Objective    Temperatures:  Current - Temp: 99.5  F (37.5  C); Max - Temp  Av.4  F (38  C)  Min: 99.5  F (37.5  C)  Max: 101.3  F (38.5  C)  Respiration range: Resp  Av  Min: 27  Max: 33  Pulse range: Heart Rate  Av.3  Min: 106  Max: 120  Blood pressure range: No data recorded.  ; No data recorded.    Pulse oximetry range: SpO2  Av %  Min: 94 %  Max: 100 %    Ventilation Mode: CMV/AC  FiO2 (%): 40 %  Rate Set (breaths/minute): 27 breaths/min  Tidal Volume Set (mL): 410 mL  PEEP (cm H2O): 13  cmH2O  Oxygen Concentration (%): 40 %  Resp: 27        Physical Exam    ROUTINE ICU LABS (Last four results)  CMP  Recent Labs  Lab 12/20/17  1142 12/20/17  0846 12/20/17  0343 12/20/17  0006 12/19/17 2007 12/19/17  1535 12/19/17  1200 12/19/17  0800 12/19/17  0351  12/18/17  0352  12/17/17  0453   NA  --   --  143  --   --  145* 144 146* 147*  < > 152*  < > 152*   POTASSIUM  --   --  3.3*  --  3.2* 3.4 3.7 4.0 4.0  < > 4.8  < > 3.3*   CHLORIDE  --   --  100  --   --  104 106 108 109  < > 115*  < > 112*   CO2  --   --  36*  --   --  32 32 30 31  < > 33*  < > 32   ANIONGAP  --   --  7  --   --  9 6 8 7  < > 4  < > 8   GLC  --   --  151*  --   --  120* 237* 157* 145*  < > 185*  < > 217*   BUN  --   --  27  --   --  22 22 22 21  < > 19  < > 20   CR  --   --  1.36*  --   --  1.36* 1.28* 1.36* 1.35*  < > 1.23*  < > 1.15*   GFRESTIMATED  --   --  42*  --   --  42* 45* 42* 42*  < > 47*  < > 51*   GFRESTBLACK  --   --  50*  --   --  50* 54* 50* 51*  < > 57*  < > 61   ISAURO  --   --  7.5*  --   --  8.8 8.6 9.5 9.8  < > 14.4*  < > 14.8*   MAG 2.5* 2.7* 2.2 2.4* 2.5* 2.3 2.5* 2.3 2.1  < > 2.3  < > 1.9   PHOS 3.3 2.4* 2.2* 1.9* 2.3* 2.5 2.5 2.0* 2.1*  < > 4.2  < > 6.6*   PROTTOTAL  --   --  4.1*  --   --   --   --   --  4.3*  --  3.5*  --  4.0*   ALBUMIN  --   --  1.9*  --   --   --   --   --  2.4*  --  1.2*  --  1.6*   BILITOTAL  --   --  3.3*  --   --   --   --   --  2.3*  --  1.7*  --  1.1   ALKPHOS  --   --  224*  --   --   --   --   --  106  --  124  --  120   AST  --   --  121*  --   --   --   --   --  75*  --  100*  --  95*   ALT  --   --  35  --   --   --   --   --  28  --  38  --  40   < > = values in this interval not displayed.  CBC  Recent Labs  Lab 12/20/17  1529 12/20/17  0343 12/19/17  1535 12/19/17  0545 12/19/17  0432 12/19/17  0351   WBC  --  21.7*  --  16.2* 16.6* 16.2*   RBC  --  2.21*  --  1.86* 1.84* 1.90*   HGB 8.5* 6.9* 7.9* 5.8* 5.8* 5.9*   HCT  --  20.9*  --  18.5* 18.4* 18.9*   MCV  --  95  --  100 100  100   MCH  --  31.2  --  31.2 31.5 31.1   MCHC  --  33.0  --  31.4* 31.5 31.2*   RDW  --  19.1*  --  18.6* 18.7* 18.5*   PLT  --  80*  --  64* 60* 64*     INR  Recent Labs  Lab 12/19/17  0545 12/18/17  0352 12/16/17  0505 12/15/17  1614   INR 1.73* 1.62* 1.29* 1.42*     Arterial Blood Gas  Recent Labs  Lab 12/20/17  1529 12/20/17  1142 12/20/17  0846 12/20/17  0619   PH 7.44 7.40 7.45 7.45   PCO2 64* 67* 57* 55*   PO2 79* 75* 74* 71*   HCO3 43* 42* 39* 38*   O2PER 40 Incorrect specimen type  40 40.0 40       Physical exam:  General: Patient lying in bed, intubated and sedated   HEENT: no scleral icterus or injection, no bruits appreciated, no JVD  Cardiac: tachycardic rate, regular rhythm, no m/r/g appreciated.   Respiratory: CTAB, no wheezes, rhonchi or crackles appreciated.  GI: NABS, NT/ND, no guarding or rebound  Extremities: No LE edema  Skin: No acute lesions appreciated  Neuro: Intubated and sedated     Imaging   CXR     Impression:   1. The new left upper extremity PICC tip projects over the low SVC.  2. Stable to mildly improved diffuse pulmonary opacities, which may  represent ARDS, pulmonary edema, or infection.

## 2017-12-21 ENCOUNTER — APPOINTMENT (OUTPATIENT)
Dept: GENERAL RADIOLOGY | Facility: CLINIC | Age: 47
DRG: 917 | End: 2017-12-21
Attending: INTERNAL MEDICINE
Payer: COMMERCIAL

## 2017-12-21 LAB
ALBUMIN SERPL-MCNC: 3.6 G/DL (ref 3.4–5)
ALP SERPL-CCNC: 346 U/L (ref 40–150)
ALT SERPL W P-5'-P-CCNC: 66 U/L (ref 0–50)
ANION GAP SERPL CALCULATED.3IONS-SCNC: ABNORMAL MMOL/L (ref 3–14)
ANISOCYTOSIS BLD QL SMEAR: SLIGHT
AST SERPL W P-5'-P-CCNC: 217 U/L (ref 0–45)
BACTERIA SPEC CULT: NO GROWTH
BASE EXCESS BLDA CALC-SCNC: 16.5 MMOL/L
BASE EXCESS BLDA CALC-SCNC: 21.4 MMOL/L
BASE EXCESS BLDA CALC-SCNC: 21.4 MMOL/L
BASOPHILS # BLD AUTO: 0 10E9/L (ref 0–0.2)
BASOPHILS NFR BLD AUTO: 0 %
BILIRUB SERPL-MCNC: 6.2 MG/DL (ref 0.2–1.3)
BUN SERPL-MCNC: 45 MG/DL (ref 7–30)
CA-I BLD-MCNC: 3.6 MG/DL (ref 4.4–5.2)
CA-I BLD-MCNC: 4.2 MG/DL (ref 4.4–5.2)
CA-I BLD-MCNC: 4.4 MG/DL (ref 4.4–5.2)
CALCIUM SERPL-MCNC: 7.5 MG/DL (ref 8.5–10.1)
CHLORIDE SERPL-SCNC: 90 MMOL/L (ref 94–109)
CO2 SERPL-SCNC: >45 MMOL/L (ref 20–32)
CREAT SERPL-MCNC: 1.28 MG/DL (ref 0.52–1.04)
DIFFERENTIAL METHOD BLD: ABNORMAL
EOSINOPHIL # BLD AUTO: 0 10E9/L (ref 0–0.7)
EOSINOPHIL NFR BLD AUTO: 0 %
ERYTHROCYTE [DISTWIDTH] IN BLOOD BY AUTOMATED COUNT: 17.6 % (ref 10–15)
GFR SERPL CREATININE-BSD FRML MDRD: 45 ML/MIN/1.7M2
GLUCOSE BLDC GLUCOMTR-MCNC: 123 MG/DL (ref 70–99)
GLUCOSE BLDC GLUCOMTR-MCNC: 138 MG/DL (ref 70–99)
GLUCOSE BLDC GLUCOMTR-MCNC: 152 MG/DL (ref 70–99)
GLUCOSE BLDC GLUCOMTR-MCNC: 153 MG/DL (ref 70–99)
GLUCOSE BLDC GLUCOMTR-MCNC: 159 MG/DL (ref 70–99)
GLUCOSE BLDC GLUCOMTR-MCNC: 166 MG/DL (ref 70–99)
GLUCOSE BLDC GLUCOMTR-MCNC: 187 MG/DL (ref 70–99)
GLUCOSE BLDC GLUCOMTR-MCNC: 188 MG/DL (ref 70–99)
GLUCOSE BLDC GLUCOMTR-MCNC: 190 MG/DL (ref 70–99)
GLUCOSE SERPL-MCNC: 139 MG/DL (ref 70–99)
GRAM STN SPEC: NORMAL
GRAM STN SPEC: NORMAL
HCO3 BLD-SCNC: 42 MMOL/L (ref 21–28)
HCO3 BLD-SCNC: 46 MMOL/L (ref 21–28)
HCO3 BLD-SCNC: 46 MMOL/L (ref 21–28)
HCT VFR BLD AUTO: 23.1 % (ref 35–47)
HGB BLD-MCNC: 7.7 G/DL (ref 11.7–15.7)
INTERPRETATION ECG - MUSE: NORMAL
LACTATE BLD-SCNC: 2.1 MMOL/L (ref 0.7–2)
LACTATE BLD-SCNC: 2.1 MMOL/L (ref 0.7–2)
LACTATE BLD-SCNC: 2.6 MMOL/L (ref 0.7–2)
LACTATE BLD-SCNC: 2.8 MMOL/L (ref 0.7–2)
LACTATE BLD-SCNC: 4.1 MMOL/L (ref 0.7–2)
LYMPHOCYTES # BLD AUTO: 1.3 10E9/L (ref 0.8–5.3)
LYMPHOCYTES NFR BLD AUTO: 6.2 %
Lab: NORMAL
MAGNESIUM SERPL-MCNC: 2.3 MG/DL (ref 1.6–2.3)
MAGNESIUM SERPL-MCNC: 2.5 MG/DL (ref 1.6–2.3)
MAGNESIUM SERPL-MCNC: 2.6 MG/DL (ref 1.6–2.3)
MCH RBC QN AUTO: 30.9 PG (ref 26.5–33)
MCHC RBC AUTO-ENTMCNC: 33.3 G/DL (ref 31.5–36.5)
MCV RBC AUTO: 93 FL (ref 78–100)
METAMYELOCYTES # BLD: 0.4 10E9/L
METAMYELOCYTES NFR BLD MANUAL: 1.8 %
MONOCYTES # BLD AUTO: 0.6 10E9/L (ref 0–1.3)
MONOCYTES NFR BLD AUTO: 2.7 %
MYELOCYTES # BLD: 1.7 10E9/L
MYELOCYTES NFR BLD MANUAL: 8 %
NEUTROPHILS # BLD AUTO: 16.9 10E9/L (ref 1.6–8.3)
NEUTROPHILS NFR BLD AUTO: 78.6 %
NRBC # BLD AUTO: 0.2 10*3/UL
NRBC BLD AUTO-RTO: 1 /100
O2/TOTAL GAS SETTING VFR VENT: 35 %
O2/TOTAL GAS SETTING VFR VENT: 40 %
O2/TOTAL GAS SETTING VFR VENT: 40 %
PCO2 BLD: 54 MM HG (ref 35–45)
PCO2 BLD: 54 MM HG (ref 35–45)
PCO2 BLD: 56 MM HG (ref 35–45)
PH BLD: 7.5 PH (ref 7.35–7.45)
PH BLD: 7.53 PH (ref 7.35–7.45)
PH BLD: 7.54 PH (ref 7.35–7.45)
PHOSPHATE SERPL-MCNC: 2.9 MG/DL (ref 2.5–4.5)
PHOSPHATE SERPL-MCNC: 3.1 MG/DL (ref 2.5–4.5)
PHOSPHATE SERPL-MCNC: 3.1 MG/DL (ref 2.5–4.5)
PHOSPHATE SERPL-MCNC: 3.3 MG/DL (ref 2.5–4.5)
PHOSPHATE SERPL-MCNC: 3.5 MG/DL (ref 2.5–4.5)
PLATELET # BLD AUTO: 89 10E9/L (ref 150–450)
PLATELET # BLD EST: ABNORMAL 10*3/UL
PO2 BLD: 108 MM HG (ref 80–105)
PO2 BLD: 72 MM HG (ref 80–105)
PO2 BLD: 92 MM HG (ref 80–105)
POTASSIUM SERPL-SCNC: 3 MMOL/L (ref 3.4–5.3)
POTASSIUM SERPL-SCNC: 3.3 MMOL/L (ref 3.4–5.3)
POTASSIUM SERPL-SCNC: 3.4 MMOL/L (ref 3.4–5.3)
POTASSIUM SERPL-SCNC: 3.7 MMOL/L (ref 3.4–5.3)
PROCALCITONIN SERPL-MCNC: 2.45 NG/ML
PROMYELOCYTES # BLD MANUAL: 0.6 10E9/L
PROMYELOCYTES NFR BLD MANUAL: 2.7 %
PROT SERPL-MCNC: 5.5 G/DL (ref 6.8–8.8)
RBC # BLD AUTO: 2.49 10E12/L (ref 3.8–5.2)
SODIUM SERPL-SCNC: 144 MMOL/L (ref 133–144)
SPECIMEN SOURCE: NORMAL
SPECIMEN SOURCE: NORMAL
WBC # BLD AUTO: 21.5 10E9/L (ref 4–11)

## 2017-12-21 PROCEDURE — 25000128 H RX IP 250 OP 636: Performed by: STUDENT IN AN ORGANIZED HEALTH CARE EDUCATION/TRAINING PROGRAM

## 2017-12-21 PROCEDURE — 82330 ASSAY OF CALCIUM: CPT | Performed by: STUDENT IN AN ORGANIZED HEALTH CARE EDUCATION/TRAINING PROGRAM

## 2017-12-21 PROCEDURE — 27210995 ZZH RX 272: Performed by: INTERNAL MEDICINE

## 2017-12-21 PROCEDURE — 84132 ASSAY OF SERUM POTASSIUM: CPT | Performed by: STUDENT IN AN ORGANIZED HEALTH CARE EDUCATION/TRAINING PROGRAM

## 2017-12-21 PROCEDURE — 20000004 ZZH R&B ICU UMMC

## 2017-12-21 PROCEDURE — 25000128 H RX IP 250 OP 636: Performed by: INTERNAL MEDICINE

## 2017-12-21 PROCEDURE — 83605 ASSAY OF LACTIC ACID: CPT | Performed by: INTERNAL MEDICINE

## 2017-12-21 PROCEDURE — 00000146 ZZHCL STATISTIC GLUCOSE BY METER IP

## 2017-12-21 PROCEDURE — 83605 ASSAY OF LACTIC ACID: CPT | Performed by: STUDENT IN AN ORGANIZED HEALTH CARE EDUCATION/TRAINING PROGRAM

## 2017-12-21 PROCEDURE — 82330 ASSAY OF CALCIUM: CPT | Performed by: INTERNAL MEDICINE

## 2017-12-21 PROCEDURE — 25000132 ZZH RX MED GY IP 250 OP 250 PS 637: Performed by: INTERNAL MEDICINE

## 2017-12-21 PROCEDURE — 40000281 ZZH STATISTIC TRANSPORT TIME EA 15 MIN

## 2017-12-21 PROCEDURE — 25000125 ZZHC RX 250: Performed by: INTERNAL MEDICINE

## 2017-12-21 PROCEDURE — P9047 ALBUMIN (HUMAN), 25%, 50ML: HCPCS | Performed by: STUDENT IN AN ORGANIZED HEALTH CARE EDUCATION/TRAINING PROGRAM

## 2017-12-21 PROCEDURE — 82803 BLOOD GASES ANY COMBINATION: CPT | Performed by: INTERNAL MEDICINE

## 2017-12-21 PROCEDURE — 84100 ASSAY OF PHOSPHORUS: CPT | Performed by: INTERNAL MEDICINE

## 2017-12-21 PROCEDURE — 94003 VENT MGMT INPAT SUBQ DAY: CPT

## 2017-12-21 PROCEDURE — 25000128 H RX IP 250 OP 636

## 2017-12-21 PROCEDURE — 83735 ASSAY OF MAGNESIUM: CPT | Performed by: STUDENT IN AN ORGANIZED HEALTH CARE EDUCATION/TRAINING PROGRAM

## 2017-12-21 PROCEDURE — 99212 OFFICE O/P EST SF 10 MIN: CPT | Mod: 25

## 2017-12-21 PROCEDURE — 94640 AIRWAY INHALATION TREATMENT: CPT | Mod: 76

## 2017-12-21 PROCEDURE — 25000125 ZZHC RX 250: Performed by: STUDENT IN AN ORGANIZED HEALTH CARE EDUCATION/TRAINING PROGRAM

## 2017-12-21 PROCEDURE — 94645 CONT INHLJ TX EACH ADDL HOUR: CPT

## 2017-12-21 PROCEDURE — 40000275 ZZH STATISTIC RCP TIME EA 10 MIN

## 2017-12-21 PROCEDURE — 25000131 ZZH RX MED GY IP 250 OP 636 PS 637: Performed by: STUDENT IN AN ORGANIZED HEALTH CARE EDUCATION/TRAINING PROGRAM

## 2017-12-21 PROCEDURE — 99291 CRITICAL CARE FIRST HOUR: CPT | Mod: GC | Performed by: INTERNAL MEDICINE

## 2017-12-21 PROCEDURE — 71010 XR CHEST PORT 1 VW: CPT

## 2017-12-21 PROCEDURE — 27210429 ZZH NUTRITION PRODUCT INTERMEDIATE LITER

## 2017-12-21 PROCEDURE — 83735 ASSAY OF MAGNESIUM: CPT | Performed by: INTERNAL MEDICINE

## 2017-12-21 PROCEDURE — 40000014 ZZH STATISTIC ARTERIAL MONITORING DAILY

## 2017-12-21 PROCEDURE — 84100 ASSAY OF PHOSPHORUS: CPT | Performed by: STUDENT IN AN ORGANIZED HEALTH CARE EDUCATION/TRAINING PROGRAM

## 2017-12-21 PROCEDURE — 84132 ASSAY OF SERUM POTASSIUM: CPT | Performed by: INTERNAL MEDICINE

## 2017-12-21 PROCEDURE — 85025 COMPLETE CBC W/AUTO DIFF WBC: CPT | Performed by: INTERNAL MEDICINE

## 2017-12-21 PROCEDURE — 87070 CULTURE OTHR SPECIMN AEROBIC: CPT | Performed by: STUDENT IN AN ORGANIZED HEALTH CARE EDUCATION/TRAINING PROGRAM

## 2017-12-21 PROCEDURE — 87040 BLOOD CULTURE FOR BACTERIA: CPT | Performed by: STUDENT IN AN ORGANIZED HEALTH CARE EDUCATION/TRAINING PROGRAM

## 2017-12-21 PROCEDURE — 27210913 ZZH NUTRITION PRODUCT INTERMEDIATE PACKET

## 2017-12-21 PROCEDURE — 87205 SMEAR GRAM STAIN: CPT | Performed by: STUDENT IN AN ORGANIZED HEALTH CARE EDUCATION/TRAINING PROGRAM

## 2017-12-21 PROCEDURE — 80053 COMPREHEN METABOLIC PANEL: CPT | Performed by: INTERNAL MEDICINE

## 2017-12-21 RX ORDER — LEVOFLOXACIN 5 MG/ML
500 INJECTION, SOLUTION INTRAVENOUS ONCE
Status: COMPLETED | OUTPATIENT
Start: 2017-12-21 | End: 2017-12-21

## 2017-12-21 RX ORDER — ACETAZOLAMIDE 500 MG/5ML
500 INJECTION, POWDER, LYOPHILIZED, FOR SOLUTION INTRAVENOUS EVERY 12 HOURS
Status: COMPLETED | OUTPATIENT
Start: 2017-12-21 | End: 2017-12-22

## 2017-12-21 RX ORDER — DEXTROSE MONOHYDRATE 25 G/50ML
25-50 INJECTION, SOLUTION INTRAVENOUS
Status: DISCONTINUED | OUTPATIENT
Start: 2017-12-21 | End: 2018-01-05 | Stop reason: HOSPADM

## 2017-12-21 RX ORDER — NICOTINE POLACRILEX 4 MG
15-30 LOZENGE BUCCAL
Status: DISCONTINUED | OUTPATIENT
Start: 2017-12-21 | End: 2018-01-05 | Stop reason: HOSPADM

## 2017-12-21 RX ORDER — FENTANYL CITRATE 50 UG/ML
25 INJECTION, SOLUTION INTRAMUSCULAR; INTRAVENOUS ONCE
Status: COMPLETED | OUTPATIENT
Start: 2017-12-21 | End: 2017-12-21

## 2017-12-21 RX ADMIN — MIDAZOLAM 4 MG: 1 INJECTION INTRAMUSCULAR; INTRAVENOUS at 08:42

## 2017-12-21 RX ADMIN — PANTOPRAZOLE SODIUM 40 MG: 40 INJECTION, POWDER, FOR SOLUTION INTRAVENOUS at 07:46

## 2017-12-21 RX ADMIN — PROPOFOL 30 MCG/KG/MIN: 10 INJECTION, EMULSION INTRAVENOUS at 10:54

## 2017-12-21 RX ADMIN — VANCOMYCIN HYDROCHLORIDE 1250 MG: 10 INJECTION, POWDER, LYOPHILIZED, FOR SOLUTION INTRAVENOUS at 05:08

## 2017-12-21 RX ADMIN — SALINE NASAL SPRAY 1 SPRAY: 1.5 SOLUTION NASAL at 00:43

## 2017-12-21 RX ADMIN — LACTULOSE 20 G: 20 SOLUTION ORAL at 07:45

## 2017-12-21 RX ADMIN — METHYLPREDNISOLONE 40 MG: 40 INJECTION, POWDER, LYOPHILIZED, FOR SOLUTION INTRAMUSCULAR; INTRAVENOUS at 22:26

## 2017-12-21 RX ADMIN — METHYLPREDNISOLONE 40 MG: 40 INJECTION, POWDER, LYOPHILIZED, FOR SOLUTION INTRAMUSCULAR; INTRAVENOUS at 14:21

## 2017-12-21 RX ADMIN — MINERAL OIL AND WHITE PETROLATUM: 150; 830 OINTMENT OPHTHALMIC at 22:44

## 2017-12-21 RX ADMIN — EPOPROSTENOL 20 NG/KG/MIN: 1.5 INJECTION, POWDER, LYOPHILIZED, FOR SOLUTION INTRAVENOUS at 10:51

## 2017-12-21 RX ADMIN — IPRATROPIUM BROMIDE AND ALBUTEROL SULFATE 3 ML: .5; 3 SOLUTION RESPIRATORY (INHALATION) at 12:57

## 2017-12-21 RX ADMIN — Medication 10 MG/HR: at 01:11

## 2017-12-21 RX ADMIN — METHYLPREDNISOLONE 40 MG: 40 INJECTION, POWDER, LYOPHILIZED, FOR SOLUTION INTRAMUSCULAR; INTRAVENOUS at 06:57

## 2017-12-21 RX ADMIN — EPOPROSTENOL 20 NG/KG/MIN: 1.5 INJECTION, POWDER, LYOPHILIZED, FOR SOLUTION INTRAVENOUS at 04:52

## 2017-12-21 RX ADMIN — ALBUMIN HUMAN 50 G: 0.25 SOLUTION INTRAVENOUS at 00:27

## 2017-12-21 RX ADMIN — Medication 1 PACKET: at 08:41

## 2017-12-21 RX ADMIN — MULTIVITAMIN 15 ML: LIQUID ORAL at 07:46

## 2017-12-21 RX ADMIN — ACETAZOLAMIDE 500 MG: 500 INJECTION, POWDER, LYOPHILIZED, FOR SOLUTION INTRAVENOUS at 14:14

## 2017-12-21 RX ADMIN — FOLIC ACID 1 MG: 5 INJECTION, SOLUTION INTRAMUSCULAR; INTRAVENOUS; SUBCUTANEOUS at 07:45

## 2017-12-21 RX ADMIN — CALCIUM GLUCONATE 4 G: 98 INJECTION, SOLUTION INTRAVENOUS at 15:05

## 2017-12-21 RX ADMIN — Medication 6 MG/HR: at 22:25

## 2017-12-21 RX ADMIN — POTASSIUM CHLORIDE 20 MEQ: 400 INJECTION, SOLUTION INTRAVENOUS at 03:31

## 2017-12-21 RX ADMIN — PROPOFOL 20 MCG/KG/MIN: 10 INJECTION, EMULSION INTRAVENOUS at 00:21

## 2017-12-21 RX ADMIN — IPRATROPIUM BROMIDE AND ALBUTEROL SULFATE 3 ML: .5; 3 SOLUTION RESPIRATORY (INHALATION) at 08:33

## 2017-12-21 RX ADMIN — THIAMINE HYDROCHLORIDE 100 MG: 100 INJECTION, SOLUTION INTRAMUSCULAR; INTRAVENOUS at 20:48

## 2017-12-21 RX ADMIN — POTASSIUM CHLORIDE 20 MEQ: 400 INJECTION, SOLUTION INTRAVENOUS at 23:36

## 2017-12-21 RX ADMIN — VANCOMYCIN HYDROCHLORIDE 1250 MG: 10 INJECTION, POWDER, LYOPHILIZED, FOR SOLUTION INTRAVENOUS at 18:01

## 2017-12-21 RX ADMIN — EPOPROSTENOL 20 NG/KG/MIN: 1.5 INJECTION, POWDER, LYOPHILIZED, FOR SOLUTION INTRAVENOUS at 23:35

## 2017-12-21 RX ADMIN — Medication 1 PACKET: at 11:07

## 2017-12-21 RX ADMIN — FENTANYL CITRATE 50 MCG: 50 INJECTION, SOLUTION INTRAMUSCULAR; INTRAVENOUS at 08:59

## 2017-12-21 RX ADMIN — Medication 150 MCG/HR: at 08:52

## 2017-12-21 RX ADMIN — DEXTROSE MONOHYDRATE 5 MCG/KG/MIN: 5 INJECTION, SOLUTION INTRAVENOUS at 08:11

## 2017-12-21 RX ADMIN — MINERAL OIL AND WHITE PETROLATUM: 150; 830 OINTMENT OPHTHALMIC at 14:15

## 2017-12-21 RX ADMIN — CALCIUM GLUCONATE 4 G: 94 INJECTION, SOLUTION INTRAVENOUS at 09:20

## 2017-12-21 RX ADMIN — EPOPROSTENOL 20 NG/KG/MIN: 1.5 INJECTION, POWDER, LYOPHILIZED, FOR SOLUTION INTRAVENOUS at 18:15

## 2017-12-21 RX ADMIN — MINERAL OIL AND WHITE PETROLATUM: 150; 830 OINTMENT OPHTHALMIC at 06:57

## 2017-12-21 RX ADMIN — LEVOFLOXACIN 500 MG: 5 INJECTION, SOLUTION INTRAVENOUS at 15:48

## 2017-12-21 RX ADMIN — INSULIN ASPART 1 UNITS: 100 INJECTION, SOLUTION INTRAVENOUS; SUBCUTANEOUS at 22:47

## 2017-12-21 RX ADMIN — Medication 3 MG: at 07:46

## 2017-12-21 RX ADMIN — POTASSIUM CHLORIDE 20 MEQ: 400 INJECTION, SOLUTION INTRAVENOUS at 04:44

## 2017-12-21 RX ADMIN — IPRATROPIUM BROMIDE AND ALBUTEROL SULFATE 3 ML: .5; 3 SOLUTION RESPIRATORY (INHALATION) at 16:27

## 2017-12-21 RX ADMIN — Medication 150 MCG/HR: at 18:13

## 2017-12-21 RX ADMIN — ENOXAPARIN SODIUM 40 MG: 40 INJECTION SUBCUTANEOUS at 10:51

## 2017-12-21 RX ADMIN — MIDAZOLAM 2 MG: 1 INJECTION INTRAMUSCULAR; INTRAVENOUS at 00:52

## 2017-12-21 RX ADMIN — IPRATROPIUM BROMIDE AND ALBUTEROL SULFATE 3 ML: .5; 3 SOLUTION RESPIRATORY (INHALATION) at 19:57

## 2017-12-21 RX ADMIN — SODIUM CHLORIDE, PRESERVATIVE FREE 5 ML: 5 INJECTION INTRAVENOUS at 15:48

## 2017-12-21 RX ADMIN — POTASSIUM CHLORIDE 40 MEQ: 1.5 POWDER, FOR SOLUTION ORAL at 17:05

## 2017-12-21 RX ADMIN — LEVOTHYROXINE SODIUM ANHYDROUS 25 MCG: 100 INJECTION, POWDER, LYOPHILIZED, FOR SOLUTION INTRAVENOUS at 07:45

## 2017-12-21 RX ADMIN — FENTANYL CITRATE 25 MCG: 50 INJECTION, SOLUTION INTRAMUSCULAR; INTRAVENOUS at 09:30

## 2017-12-21 RX ADMIN — THIAMINE HYDROCHLORIDE 100 MG: 100 INJECTION, SOLUTION INTRAMUSCULAR; INTRAVENOUS at 07:46

## 2017-12-21 RX ADMIN — POTASSIUM CHLORIDE 40 MEQ: 1.5 POWDER, FOR SOLUTION ORAL at 08:38

## 2017-12-21 RX ADMIN — SODIUM CHLORIDE, POTASSIUM CHLORIDE, SODIUM LACTATE AND CALCIUM CHLORIDE 500 ML: 600; 310; 30; 20 INJECTION, SOLUTION INTRAVENOUS at 04:02

## 2017-12-21 NOTE — PHARMACY-VANCOMYCIN DOSING SERVICE
Pharmacy Vancomycin Initial Note  Date of Service 2017  Patient's  1970  47 year old, female    Indication: Bacteremia    Current estimated CrCl = 53.7 mL/min    Creatinine for last 3 days  2017:  8:10 AM Creatinine 1.31 mg/dL; 11:46 AM Creatinine 1.39 mg/dL;  3:45 PM Creatinine 1.44 mg/dL;  7:57 PM Creatinine 1.30 mg/dL; 11:30 PM Creatinine 1.27 mg/dL  2017:  3:51 AM Creatinine 1.35 mg/dL;  8:00 AM Creatinine 1.36 mg/dL; 12:00 PM Creatinine 1.28 mg/dL;  3:35 PM Creatinine 1.36 mg/dL  2017:  3:43 AM Creatinine 1.36 mg/dL  2017:  2:25 AM Creatinine 1.28 mg/dL    Recent Vancomycin Level(s) for last 3 days  No results found for requested labs within last 72 hours.      Vancomycin IV Administrations (past 72 hours)      No vancomycin orders with administrations in past 72 hours.                Nephrotoxins and other renal medications (Future)    Start     Dose/Rate Route Frequency Ordered Stop    17 0430  vancomycin (VANCOCIN) 1,250 mg in NaCl 0.9 % 250 mL intermittent infusion      1,250 mg  over 90 Minutes Intravenous EVERY 24 HOURS 17 0419      17 2230  furosemide (LASIX) 100 mg in NaCl 0.9 % 100 mL infusion      15 mg/hr  15 mL/hr  Intravenous CONTINUOUS 17 2220      17 0130  norepinephrine (LEVOPHED) 16 mg in D5W 250 mL infusion      0.03-0.6 mcg/kg/min × 62.3 kg  1.8-35 mL/hr  Intravenous CONTINUOUS 17 0124            Contrast Orders - past 72 hours     None                Plan:  1.  Start vancomycin 1250 mg IV q24h.   2.  Goal Trough Level: 15-20 mg/L   3.  Pharmacy will check trough levels as appropriate in 1-3 Days.    4. Serum creatinine levels will be ordered daily for the first week of therapy and at least twice weekly for subsequent weeks.    5. [place] method utilized to dose vancomycin therapy: Method 2    Ashely Chandra Pharm.D.

## 2017-12-21 NOTE — PROGRESS NOTES
WO Nurse Inpatient Adult Pressure InjuryAssessment    Initial Assessment  Reason for consultation: Evaluate and treat multiple pressure injuries    Assessment:   Left cheek wound due to Pressure Injury  Pressure Injury appears to be a Stage 2 with devitalized epidermis over the lateral aspect of the wound. This may continue to evolve and will be monitored closely. Present on admission No  This is a Medical Device Related Pressure Injury (MDRPI) due to ETT securement device    Right cheek wound due to Pressure Injury  Pressure Injury is Stage Suspected Deep Tissue.  Present on admission No  This is a Medical Device Related Pressure Injury (MDRPI) due to ETT securement device    Left proximal forearm wound due to Pressure Injury  Pressure Injury is Stage Suspected Deep Tissue  Present on admission No  This is a Medical Device Related Pressure Injury (MDRPI) due to IV hub    Left distal forearm wound due to Pressure Injury  Pressure Injury is Stage 2. Present on admission No  This is a Medical Device Related Pressure Injury (MDRPI) due to IV cap    Contributing factor of the pressure injury: immobility, proming  Status: initial assessment, evolving    Photo: see individual wound assessments below    TREATMENT  PLAN    Bilateral cheek wounds: Wash twice daily with saline and gauze. Gently rub Vaseline onto wounds to keep moist. If patient is to be proned in the future, please pad cheeks with Mepilex and reposition head every hour.    Left forearm (at AC): wash every other day with wound cleanser and gauze. Cover with Mepilex.    Orders Written  WO Nurse follow-up plan:twice weekly  Nursing to notify the Provider(s) and re-consult the WOC Nurse if wound(s) deteriorates or new skin concern.    Patient History  According to provider note(s):    Ana Laura Wharton is a 46 yo female with hx of bipolar disorder with depression, polysubstance abuse, & prior suicide attempts, admitted to [facility] MICU  Following calcium channel  blocker (CCB) overdose, tylenol overdose & alcohol overdose. Course complicated by lactic acidosis, hypoventilation, acute renal failure, & severe hypotension/vasogenic shock. Currently intubated, sedated, & proned.      Patient proned for lung recruitment last from 12/19/17 at 1315 to 12/20/17 at 0520 (per nursing notes). On 12/21/17 ET tabs changed and pressure injury noted under old tabs. WOC consult placed. Left forearm pressure injury noted under IV during WOC skin assessment.    Objective Data   Containment of urine/stool: Internal fecal management and Indwelling catheter    Current Diet/ Nutrition:    Active Diet Order      NPO for Medical/Clinical Reasons Except for: Meds    Output:   I/O last 3 completed shifts:  In: 5251.1 [I.V.:2976.1; NG/GT:585; IV Piggyback:500]  Out: 7670 [Urine:7070; Emesis/NG output:50; Stool:550]    Skin Assessment: Sujit Sujit Score  Avg: 10.9  Min: 8  Max: 20                                Sujit Q No Data Recorded                     NSRAS No Data Recorded               Labs:   Recent Labs  Lab 12/21/17  0225  12/19/17  0545  12/14/17  1051   HGB 7.7*  < > 5.8*  < >  --    INR  --   --  1.73*  < >  --    WBC 21.5*  < > 16.2*  < >  --    CRP  --   --   --   --  8.0   < > = values in this interval not displayed.        Recent Labs  Lab 12/21/17  0225 12/20/17  0502 12/20/17  0501 12/15/17  0428 12/15/17  0145   CULT No growth after 3 hours  No growth after 3 hours No growth after 1 day No growth after 1 day No growth Canceled, Test creditedTest canceled by PCU/ClinicPER KISHA ARAUJO S       Physical Exam    Skin assessment:   Focused skin inspection: anterior body    Wound Location:  Left cheek    12/21/17 Left cheek    Wound History: see history above.  Measurements (length x width x depth, in cm) 3.8 cm x 2.2 cm  x  0 cm   Wound Base:  Intact, non-blanchable epidermis. Scattered serous fluid filled blisters on distal edge. Lateral edge with darker purple devitalized tissue. May  represent epidermal layer of unroofed blister or deeper tissue injury. Will continue to monitor as wound evolves.  Palpation of the wound bed: firm   Periwound skin: intact  Color: normal and consistent with surrounding tissue  Temperature: normal   Drainage: none  Description of drainage: none  Odor: none  Pain: unable to assess due to  sedation      Wound Location:  Right cheek    12/21/17 Right cheek    Wound History: see history above  Measurements (length x width x depth, in cm) 1.2 cm x 1 cm  x  0 cm   Wound Base:  Intact, non-blanchable epidermis  Palpation of the wound bed: firm   Periwound skin: intact  Color: normal and consistent with surrounding tissue  Temperature: normal   Drainage: none  Description of drainage: none  Odor: none  Pain: unable to assess due to  sedation     Wound Location:  Left proximal forearm    12/21/17 left forearm    Wound History: Noted during skin assessment. Wound was found under hub of peripheral IV site  Measurements (length x width x depth, in cm) 0.8 cm x 2.1 cm  x  0 cm   Wound Base:  Non-blanchable, intact epidermis  Palpation of the wound bed: normal   Periwound skin: skin stripping from tape just medial to DTI  Color: normal and consistent with surrounding tissue  Temperature: normal   Drainage: none  Description of drainage: none  Odor: none  Pain: unable to assess due to  sedation     Wound Location:  Left distal forearm    12/21/17 left forearm    Wound History: Noted during skin assessment. Wound was found under hub of peripheral IV site  Measurements (length x width x depth, in cm) 0.5 cm x 0.8 cm  x  0.01 cm   Wound Base:  Moist, pink dermis  Palpation of the wound bed: normal   Periwound skin: intact  Color: normal and consistent with surrounding tissue  Temperature: normal   Drainage: none  Description of drainage: none  Odor: none  Pain: unable to assess due to  sedation     Interventions  Current support surface: Standard  Low air loss    Current off-loading  measures: Pillows under calves  Visual inspection of wound(s) completed  Wound Care:was done per plan of care    Cleansing with saline solution  Supplies: Placed at Bedside  Education provided today: positioning    Discussed plan of care with Nurse    Face to face time: 30 minutes

## 2017-12-21 NOTE — PLAN OF CARE
Problem: Patient Care Overview  Goal: Plan of Care/Patient Progress Review  Outcome: No Change  D/I: Pt paralyzed and sedated on cis, propofol, versed and fentanyl.  Lasix gtt and D50 infusion.  At 2204 pt HR/BP elevated and pupils unequal R>L.  Md notified.  Stat head CT, albumin 50g, lasix gtt d/c, prns given for questionable underlying pain. Lactate 4.7 rising.  A/P: Head CT negative. HR/BP slowly returned to baseline throughout shift.  Pt displaying metabolic alkalosis this am, MD to advise on vent settings.  D50 weaned off  Continue to monitor BS. LR 500ml bolus given.  Lactate improving. K replaced.  Suspected pressure injury found underneath ETT lockhart.  WOC consult and I Care placed.

## 2017-12-21 NOTE — PROGRESS NOTES
Medical ICU Progress Note    Patient:  Ana Laura Wharton   Date of birth 1970, Medical record number [id number]  Date of Visit:  12/21/2017  Date of Admission: 12/13/2017    Ana Laura Wharton is a 46 yo female with hx of bipolar disorder with depression, polysubstance abuse, & prior suicide attempts, admitted to [facility] MICU  Following calcium channel blocker (CCB) overdose, tylenol overdose & alcohol overdose. Course complicated by lactic acidosis, hypoventilation, acute renal failure, & severe hypotension/vasogenic shock. Currently intubated, sedated, & proned. Now off paralytic and weaning sedation      Concise Assessment and Plan  # Suicide Attempt by acetaminophen, ETOH, and amlodipine  - Continue to monitor      # ARDS 2/2 aspiration  - Continue ARDSnet protocol  - discontinue paralysis; wean versed  - Continued Flolan      # Volume Overload   - diamox 500 mg BID     # Sepsis 2/2 Sinusitis  Levofloxacin, vancomycin for sinusitis, concern for worsening sepsis; will reassess tomorrow     NEURO  Sedation/Analgesia:  Midazolam gtt; will slow wean today and hold on others to not remove too many at once  Fentanyl gtt   Propofol gtt       Paralytic: d/c paralytic today      Bipolar I disorder with anxiety & depression  Suicide attempt with polysubstance abuse (amlodipine, nyquil (acetaminophen), alcohol (vodka))  Completed poison control recommendations of calcium drip with goal of ical 10. Further, completed high dose insulin drip and will trend BPs.   - Manage OD as per below  - Will monitor for withdrawal symptoms once weaned from sedation (ETOH 0.17 at OSH). IV thiamine & folate  - Holding home zyprexa & remeron.  - Psych consult for OD when awake      Agitation: Patient at high risk of alcohol & benzo withdrawal  - Slow wean of sedation when medically appropriate      RESPIRATORY  Ventilation Mode: CMV/AC  FiO2 (%): 40 %  Rate Set (breaths/minute): 27 breaths/min  Tidal Volume Set (mL): 410 mL  PEEP (cm H2O): 13  cmH2O  Oxygen Concentration (%): 40 %  Resp: 27      ARDS 2/2 aspiration   Acute Hypoxic  Respiratory Failure:  Acute Hypercarbic Respiratory Failure  Due initially to hypoventilation due to overdose, now due to V/Q mismatch in the setting of ARDS. ARDS net vent settings for lung protection   - remained supine today  - Inhaled Flolan for increased oxygenation, will work on weaning going forward  - Vent settings as per above  - daily ABG  - Solumedrol in the setting of recurrent hypercarbia and concern for air trapping in the setting of possible COPD.   - Duonebs for air trapping      CARDIOVASCULAR  Hypotension/Vasodilatory shock/Vasoplegia, Resolved:   Required pressors. Side effect of CCB overdose. ECHO shows normal EF of 70%. Normal RV function. She has been titrated off norepi and has been hemodynamically stable. Further, she has been titrated off her insulin and pressures have been stable.   - Continue stress dose steroids until post ARDS      Paroxysmal atrial fibrillation: Briefly in Afib with RVR this 12/20. Received metoprolol x 2. Converted to NSR. Will hopefully improve once fluid status improves  - No acute need for management at this time       Intermittent SVT  Patient has had SVT with rates in the 190s intermittently and spontaneously resolves. Hemodynamically stable during episodes of SVT  - Patient has been stable without receiving further medications     RENAL / FLUIDS / ELECTROLYTES  Baseline creatinine: 0.7-0.8.       Acute Renal Failure 2/2 calcium channel blocker overdose:   As high as 1.6.  Due to CCB overdose. Volume overload, pulmonary edema. Cr slowly improving with diuresis & forward flow  - Lasix gtt held today  - Metolazone 10 mg held today   - I/Os, Monitor Cr, UOP  - Monitoring lytes, replace  - will slow diuresis goal today given shifting likely contributed to lactic acidosis      Lactic Acidosis/AGMA, resolved: Due to hypoperfusion. Lactate may continue to be elevated until after CCB  has cleared. Likely acute rise last night was 2/2 volume shifting given massive diuresis over last 2 days coupled with OD.   - Continue to trend  - Goal of pH>7.25, bicarb gtt as necessary    Metabolic alkalosis  Likely 2/2 volume contraction in setting of aggressive diuresis while on ventilator. Will plan to switch lasix gtt to diamox for today and reassess tomorrow  -hold lasix, metolazone  -diamox 500mg x2 doses      GASTROINTESTINAL    Acetaminophen overdose  Elevated Transaminases  Found next to bottle of Nyquil. AST/ALTs now normal s/p NAC gtt.   - Trend LFTs, INR      OG: OG to LIS      Hx of C.diff: Hx of fecal transplant. Has had formed stool      Tube Feeds: Re-evaluate daily      INFECTIOUS DISEASE     Sepsis 2/2 Sinusitis  Patient has new fever and rising leukocytosis with worsening sinus infection with drainage at this time  - Initiation of Levofloxacin for sinusitis  - added vancomycin in o/n due to concern for new-onset infection vs resistant organism in sinuses; pending status, may wean back off tomorrow given stable/improving respiratory status      HEMATOLOGY   Anemia: Normocytic Anemia. Likely due to initial dilution & chronic illness. Haptoglobin and firbrinogen normal with LDH elevated. Hemolysis unlikely. Patient has elevated reticulocyte count, but not quite appropriately elevated   - will recheck retic tomorrow to assess for ongoing poor production response  - Continue to trend hgb   - Tranfuse < 7      Thrombocytopenia: Unknown etiology. Does not appear to be HIT related. Stable at 89  - Monitor daily      INR: As high as 1.55 in the setting of overdose & liver injury.   - will recheck tomorrow      ENDOCRINE  Off insulin gtt  -now on SS correction for hyperglycemia      Hypothyroidism: Levothyroxine IV, switch back to oral once able      SKIN CARE  #Facial redness  Likely 2/2 being proned. Will continue to monitor      Prophylaxis: DVT: Lovenox: GI: PPI  Code status: Full  Lines: R PIV, L  triple lumen PICC, R IJ CVC, patel, rectal tube, ETT      Patient seen and discussed with Dr. Rogers who agrees with the assessment and plan    Kami Feliciano MD  Internal Medicine PGY1  Pager: [phone]        Interval Events  Bilateral pupil dilation o/n. Head CT done with no acute intracranial pathology. Lactate elevated to 3.3. Given albumin. Found elevated to 4.1. Vancomycin started, IVF 1L given. Patient stable with downtrend in lactate this AM    Objective  Temp: 100.2  F (37.9  C) Temp  Min: 98.7  F (37.1  C)  Max: 100.2  F (37.9  C)  Resp: 22 Resp  Min: 22  Max: 27  SpO2: 94 % SpO2  Min: 93 %  Max: 100 %  Heart Rate: 101 Heart Rate  Min: 95  Max: 121  BP: (!) 88/44   Systolic (24hrs), Av , Min:88 , Max:88   Diastolic (24hrs), Av, Min:44, Max:44      Ventilation Mode: CMV/AC  FiO2 (%): 40 %  Rate Set (breaths/minute): 27 breaths/min  Tidal Volume Set (mL): 410 mL  PEEP (cm H2O): 13 cmH2O  Oxygen Concentration (%): 40 %  Resp: 27        Physical Exam    ROUTINE ICU LABS (Last four results)  CMP  Recent Labs  Lab 17  0649 17  0225 17  1953 17  1529  17  0343  17  1535 17  1200  17  0351  17  0352   NA  --  144  --   --   --  143  --  145* 144  < > 147*  < > 152*   POTASSIUM  --  3.0* 3.5 3.4  --  3.3*  < > 3.4 3.7  < > 4.0  < > 4.8   CHLORIDE  --  90*  --   --   --  100  --  104 106  < > 109  < > 115*   CO2  --  >45*  --   --   --  36*  --  32 32  < > 31  < > 33*   ANIONGAP  --  Not Calculated  --   --   --  7  --  9 6  < > 7  < > 4   GLC  --  139*  --   --   --  151*  --  120* 237*  < > 145*  < > 185*   BUN  --  45*  --   --   --  27  --  22 22  < > 21  < > 19   CR  --  1.28*  --   --   --  1.36*  --  1.36* 1.28*  < > 1.35*  < > 1.23*   GFRESTIMATED  --  45*  --   --   --  42*  --  42* 45*  < > 42*  < > 47*   GFRESTBLACK  --  54*  --   --   --  50*  --  50* 54*  < > 51*  < > 57*   ISAURO  --  7.5*  --   --   --  7.5*  --  8.8 8.6  < > 9.8  < > 14.4*    MAG 2.3 2.5* 2.1 2.3  < > 2.2  < > 2.3 2.5*  < > 2.1  < > 2.3   PHOS 3.1 3.1 3.6 4.0  < > 2.2*  < > 2.5 2.5  < > 2.1*  < > 4.2   PROTTOTAL  --  5.5*  --   --   --  4.1*  --   --   --   --  4.3*  --  3.5*   ALBUMIN  --  3.6  --   --   --  1.9*  --   --   --   --  2.4*  --  1.2*   BILITOTAL  --  6.2*  --   --   --  3.3*  --   --   --   --  2.3*  --  1.7*   ALKPHOS  --  346*  --   --   --  224*  --   --   --   --  106  --  124   AST  --  217*  --   --   --  121*  --   --   --   --  75*  --  100*   ALT  --  66*  --   --   --  35  --   --   --   --  28  --  38   < > = values in this interval not displayed.  CBC  Recent Labs  Lab 12/21/17  0225 12/20/17  2304 12/20/17  1529 12/20/17  0343  12/19/17  0545   WBC 21.5* 26.0*  --  21.7*  --  16.2*   RBC 2.49* 2.93*  --  2.21*  --  1.86*   HGB 7.7* 8.8* 8.5* 6.9*  < > 5.8*   HCT 23.1* 26.5*  --  20.9*  --  18.5*   MCV 93 90  --  95  --  100   MCH 30.9 30.0  --  31.2  --  31.2   MCHC 33.3 33.2  --  33.0  --  31.4*   RDW 17.6* 18.0*  --  19.1*  --  18.6*   PLT 89* 101*  --  80*  --  64*   < > = values in this interval not displayed.  INR    Recent Labs  Lab 12/19/17  0545 12/18/17  0352 12/16/17  0505 12/15/17  1614   INR 1.73* 1.62* 1.29* 1.42*     Arterial Blood Gas    Recent Labs  Lab 12/21/17  0225 12/20/17  1529 12/20/17  1142 12/20/17  0846   PH 7.53* 7.44 7.40 7.45   PCO2 56* 64* 67* 57*   PO2 92 79* 75* 74*   HCO3 46* 43* 42* 39*   O2PER 40.0 40 Incorrect specimen type  40 40.0       Physical exam:  General: Patient lying in bed, intubated and sedated, moving all 4 to pain and cares  HEENT: no scleral icterus or injection, no bruits appreciated, no JVD, face red and puffy  Cardiac: tachycardic rate, regular rhythm, 2/6 NETO heard best at the apex consistent with MR  Respiratory: CTAB, no wheezes, rhonchi or crackles appreciated.  GI: BS+, NT/ND, no guarding or rebound, passing stool  Extremities: No LE edema  Skin: No acute lesions appreciated  Neuro: Intubated and  sedated     Imaging   CXR 12/21  Impression: Stable versus slightly improved diffuse patchy opacities  which could represent pulmonary edema, infection, and/or ARDS    Head CT 12/21  Impression:   1. No acute intracranial pathology.  2. Mild mucosal thickening involving the bilateral sphenoid sinuses.  Small dependent fluid in the bilateral maxillary sinuses. Fluid in the  bilateral mastoid air cells. These findings are all new since the  prior study.

## 2017-12-21 NOTE — PROGRESS NOTES
CLINICAL NUTRITION SERVICES - REASSESSMENT NOTE     Nutrition Prescription    RECOMMENDATIONS FOR MDs/PROVIDERS TO ORDER:  Continue to adv TF rate per orders. Monitor lytes and replace lytes PRN.     If sinusitis improves, consider placement of small bore FT so that FT can stay in place once pt is extubated. Pt is likely going to be very deconditioned and will likely not be able to meet her nutritional needs via PO intake (high risk for dysphagia and weakness post extubation) without supplemental TF.    Malnutrition Status:    Severe malnutrition in the context of chronic illness.     Future/Additional Recommendations:  If pt remains on the vent, will plan to order metabolic cart study to better evaluate REE (kcal) needs and approp of energy provisions.       EVALUATION OF THE PROGRESS TOWARD GOALS   Diet: NPO  Nutrition Support: TwoCal HN @ 35 mL/hr + 2 pkt Prosource = 1760 kcals (28 kcals/kg/day), 93 g PRO (1.5 g/kg/day), 588 mL H2O, 184 g CHO and 4 g Fiber daily.  Intake: minimal, started last evening.        NEW FINDINGS   Pt now has a stage 2 pressure injury on her cheek d/t head gear from ETT. TF were started yesterday after being NPO x 7 days. Pt's TF are infusing via OGT d/t pt having severe sinusitis.     MALNUTRITION  % Intake: </= 50% for >/= 5 days   % Weight Loss: None noted  Subcutaneous Fat Loss: None observed  Muscle Loss: Scapular bone: moderate, Thoracic region (clavicle, acromium bone, deltoid, trapezius, pectoral): moderate, Upper arm (bicep, tricep): moderate, Lower arm (forearm): moderate, Dorsal hand: moderate, Upper leg (quadricep, hamstring): mild-to-moderate and Posterior calf: moderate  Fluid Accumulation/Edema: Moderate- anasarca, significant edema in UE and some edema in upper legs  Malnutrition Diagnosis: Severe malnutrition in the context of chronic illness.     Previous Goals from 12/15/17   Diet adv v nutrition support within 1-2 days.  Total avg nutritional intake to meet a minimum  of 25 kcal/kg and 1.2 g PRO/kg daily (per dosing wt 62 kg).  Evaluation: Not met    Previous Nutrition Diagnosis  Inadequate protein-energy intake  Evaluation: No change    CURRENT NUTRITION DIAGNOSIS  Inadequate protein-energy intake related to resp status inhibiting ability to take PO as evidenced by pt NPO x 7 days, pt with severe malnutrition with muscle wasting and significant edema.       INTERVENTIONS  Implementation  Collaboration with other providers- MICU rounds.     Goals  Total avg nutritional intake to meet a minimum of 25 kcal/kg and 1.2 g PRO/kg daily (per dosing wt 62 kg).    Monitoring/Evaluation  Progress toward goals will be monitored and evaluated per protocol.    Ghislaine Montes, RD, LD  (MICU dietitian, dzo- [phone])

## 2017-12-21 NOTE — PLAN OF CARE
Problem: Patient Care Overview  Goal: Plan of Care/Patient Progress Review  Outcome: Therapy, progress toward functional goals is gradual  D/I: Pt was supinated this AM at 0520 and has remained on her back all day. Tolerating the ventilator, still on heavy sedation and medical paralytic, see lab results for AB's. Unable to suction anything out ETT. Sating 99% on 40% FiO2 and a PEEP of 13. Heart rate has been tachy through the day, mostly in the 110's. One 6 beat run of V-tach this AM and multiple PVC's and PAC's throughout the day. Scheduled potassium started as well as replacement per protocol. Mg and Phos both replaced during the day. Patient also received one unit of PRBC's this AM for Hgb<7 and recheck was 8.5. Blood pressures have been good with MAP's >65 all day after getting PRBC's and albumin this AM for soft BP. Urine output has been 200ml to 900ml/hr with lasix drip increased to 15mg/hr. Patient presently net negative 1.7 liters since midnight. Two large, liquid BM's this afternoon. Order for rectal tube placement obtained. Periorbital edema greatly reduced from earlier today.   A: Tenuous respiratory status.  P: Continue to monitor and assess. Wean vent as able. Follow treatment plan.

## 2017-12-21 NOTE — PHARMACY-VANCOMYCIN DOSING SERVICE
Pharmacy Empiric Vancomycin Dose Change Per Policy    Original Dose Ordered: Vancomycin 1,250mg IV q24h  Dose Changed To: Vancomycin 1,250mg IV q12h  This dose change was based on the pharmacist's assessment of this patient's age, weight, concurrent drug therapy, treatment goals, whether patient's creatinine clearance adequately indicates renal function (factoring in age, muscle mass, fluid and clinical status), and, if applicable, prior pharmacokinetic data.     Calculated Creatinine Clearance= 54 ml/min, given acute illness and current creatinine clearance/urine output will increase dose of vancomycin to 1,250mg IV q12h.    Will continue to follow and modify dosage according to levels, organ function and clinical condition    Jaclyn Mckinley.D. Resident

## 2017-12-22 ENCOUNTER — APPOINTMENT (OUTPATIENT)
Dept: GENERAL RADIOLOGY | Facility: CLINIC | Age: 47
DRG: 917 | End: 2017-12-22
Attending: INTERNAL MEDICINE
Payer: COMMERCIAL

## 2017-12-22 ENCOUNTER — APPOINTMENT (OUTPATIENT)
Dept: GENERAL RADIOLOGY | Facility: CLINIC | Age: 47
DRG: 917 | End: 2017-12-22
Attending: SURGERY
Payer: COMMERCIAL

## 2017-12-22 LAB
ALBUMIN SERPL-MCNC: 2.9 G/DL (ref 3.4–5)
ALP SERPL-CCNC: 591 U/L (ref 40–150)
ALT SERPL W P-5'-P-CCNC: 92 U/L (ref 0–50)
ANION GAP SERPL CALCULATED.3IONS-SCNC: 10 MMOL/L (ref 3–14)
ANION GAP SERPL CALCULATED.3IONS-SCNC: 7 MMOL/L (ref 3–14)
ANION GAP SERPL CALCULATED.3IONS-SCNC: 8 MMOL/L (ref 3–14)
ANISOCYTOSIS BLD QL SMEAR: SLIGHT
AST SERPL W P-5'-P-CCNC: 241 U/L (ref 0–45)
BASE EXCESS BLDA CALC-SCNC: 9.1 MMOL/L
BASOPHILS # BLD AUTO: 0 10E9/L (ref 0–0.2)
BASOPHILS NFR BLD AUTO: 0 %
BILIRUB SERPL-MCNC: 6 MG/DL (ref 0.2–1.3)
BUN SERPL-MCNC: 72 MG/DL (ref 7–30)
BUN SERPL-MCNC: 73 MG/DL (ref 7–30)
BUN SERPL-MCNC: 79 MG/DL (ref 7–30)
C DIFF TOX B STL QL: POSITIVE
CA-I BLD-MCNC: 4.5 MG/DL (ref 4.4–5.2)
CALCIUM SERPL-MCNC: 8 MG/DL (ref 8.5–10.1)
CALCIUM SERPL-MCNC: 8.2 MG/DL (ref 8.5–10.1)
CALCIUM SERPL-MCNC: 8.4 MG/DL (ref 8.5–10.1)
CHLORIDE SERPL-SCNC: 102 MMOL/L (ref 94–109)
CHLORIDE SERPL-SCNC: 109 MMOL/L (ref 94–109)
CHLORIDE SERPL-SCNC: 111 MMOL/L (ref 94–109)
CO2 SERPL-SCNC: 29 MMOL/L (ref 20–32)
CO2 SERPL-SCNC: 32 MMOL/L (ref 20–32)
CO2 SERPL-SCNC: 39 MMOL/L (ref 20–32)
CREAT SERPL-MCNC: 0.81 MG/DL (ref 0.52–1.04)
CREAT SERPL-MCNC: 0.92 MG/DL (ref 0.52–1.04)
CREAT SERPL-MCNC: 1.18 MG/DL (ref 0.52–1.04)
DIFFERENTIAL METHOD BLD: ABNORMAL
EOSINOPHIL # BLD AUTO: 0 10E9/L (ref 0–0.7)
EOSINOPHIL NFR BLD AUTO: 0 %
ERYTHROCYTE [DISTWIDTH] IN BLOOD BY AUTOMATED COUNT: 18.1 % (ref 10–15)
GFR SERPL CREATININE-BSD FRML MDRD: 49 ML/MIN/1.7M2
GFR SERPL CREATININE-BSD FRML MDRD: 66 ML/MIN/1.7M2
GFR SERPL CREATININE-BSD FRML MDRD: 75 ML/MIN/1.7M2
GLUCOSE BLDC GLUCOMTR-MCNC: 167 MG/DL (ref 70–99)
GLUCOSE BLDC GLUCOMTR-MCNC: 181 MG/DL (ref 70–99)
GLUCOSE BLDC GLUCOMTR-MCNC: 189 MG/DL (ref 70–99)
GLUCOSE BLDC GLUCOMTR-MCNC: 190 MG/DL (ref 70–99)
GLUCOSE BLDC GLUCOMTR-MCNC: 216 MG/DL (ref 70–99)
GLUCOSE BLDC GLUCOMTR-MCNC: 225 MG/DL (ref 70–99)
GLUCOSE BLDC GLUCOMTR-MCNC: 236 MG/DL (ref 70–99)
GLUCOSE SERPL-MCNC: 175 MG/DL (ref 70–99)
GLUCOSE SERPL-MCNC: 216 MG/DL (ref 70–99)
GLUCOSE SERPL-MCNC: 223 MG/DL (ref 70–99)
HCO3 BLD-SCNC: 34 MMOL/L (ref 21–28)
HCT VFR BLD AUTO: 24.3 % (ref 35–47)
HGB BLD-MCNC: 7.9 G/DL (ref 11.7–15.7)
INR PPP: 1.53 (ref 0.86–1.14)
LACTATE BLD-SCNC: 1.7 MMOL/L (ref 0.7–2)
LACTATE BLD-SCNC: 1.8 MMOL/L (ref 0.7–2)
LACTATE BLD-SCNC: 2.2 MMOL/L (ref 0.7–2)
LYMPHOCYTES # BLD AUTO: 1.6 10E9/L (ref 0.8–5.3)
LYMPHOCYTES NFR BLD AUTO: 5.2 %
MAGNESIUM SERPL-MCNC: 2.4 MG/DL (ref 1.6–2.3)
MAGNESIUM SERPL-MCNC: 2.4 MG/DL (ref 1.6–2.3)
MAGNESIUM SERPL-MCNC: 2.5 MG/DL (ref 1.6–2.3)
MAGNESIUM SERPL-MCNC: 2.6 MG/DL (ref 1.6–2.3)
MCH RBC QN AUTO: 31.1 PG (ref 26.5–33)
MCHC RBC AUTO-ENTMCNC: 32.5 G/DL (ref 31.5–36.5)
MCV RBC AUTO: 96 FL (ref 78–100)
METAMYELOCYTES # BLD: 1.1 10E9/L
METAMYELOCYTES NFR BLD MANUAL: 3.5 %
MONOCYTES # BLD AUTO: 1.3 10E9/L (ref 0–1.3)
MONOCYTES NFR BLD AUTO: 4.3 %
NEUTROPHILS # BLD AUTO: 26.6 10E9/L (ref 1.6–8.3)
NEUTROPHILS NFR BLD AUTO: 87 %
NRBC # BLD AUTO: 0.5 10*3/UL
NRBC BLD AUTO-RTO: 2 /100
O2/TOTAL GAS SETTING VFR VENT: 35 %
PCO2 BLD: 49 MM HG (ref 35–45)
PH BLD: 7.46 PH (ref 7.35–7.45)
PHOSPHATE SERPL-MCNC: 1.7 MG/DL (ref 2.5–4.5)
PHOSPHATE SERPL-MCNC: 2.7 MG/DL (ref 2.5–4.5)
PHOSPHATE SERPL-MCNC: 2.9 MG/DL (ref 2.5–4.5)
PHOSPHATE SERPL-MCNC: 3 MG/DL (ref 2.5–4.5)
PLATELET # BLD AUTO: 98 10E9/L (ref 150–450)
PO2 BLD: 74 MM HG (ref 80–105)
POTASSIUM SERPL-SCNC: 3.3 MMOL/L (ref 3.4–5.3)
POTASSIUM SERPL-SCNC: 3.7 MMOL/L (ref 3.4–5.3)
POTASSIUM SERPL-SCNC: 3.8 MMOL/L (ref 3.4–5.3)
POTASSIUM SERPL-SCNC: 3.8 MMOL/L (ref 3.4–5.3)
PROT SERPL-MCNC: 5.2 G/DL (ref 6.8–8.8)
RBC # BLD AUTO: 2.54 10E12/L (ref 3.8–5.2)
RETICS # AUTO: 99.3 10E9/L (ref 25–95)
RETICS/RBC NFR AUTO: 3.9 % (ref 0.5–2)
SODIUM SERPL-SCNC: 149 MMOL/L (ref 133–144)
SODIUM SERPL-SCNC: 149 MMOL/L (ref 133–144)
SODIUM SERPL-SCNC: 150 MMOL/L (ref 133–144)
SPECIMEN SOURCE: ABNORMAL
VANCOMYCIN SERPL-MCNC: 29 MG/L
WBC # BLD AUTO: 30.6 10E9/L (ref 4–11)

## 2017-12-22 PROCEDURE — 25000125 ZZHC RX 250: Performed by: INTERNAL MEDICINE

## 2017-12-22 PROCEDURE — 25000128 H RX IP 250 OP 636: Performed by: STUDENT IN AN ORGANIZED HEALTH CARE EDUCATION/TRAINING PROGRAM

## 2017-12-22 PROCEDURE — 84132 ASSAY OF SERUM POTASSIUM: CPT | Performed by: STUDENT IN AN ORGANIZED HEALTH CARE EDUCATION/TRAINING PROGRAM

## 2017-12-22 PROCEDURE — 27210913 ZZH NUTRITION PRODUCT INTERMEDIATE PACKET

## 2017-12-22 PROCEDURE — 82803 BLOOD GASES ANY COMBINATION: CPT | Performed by: INTERNAL MEDICINE

## 2017-12-22 PROCEDURE — 25000132 ZZH RX MED GY IP 250 OP 250 PS 637: Performed by: STUDENT IN AN ORGANIZED HEALTH CARE EDUCATION/TRAINING PROGRAM

## 2017-12-22 PROCEDURE — 27210429 ZZH NUTRITION PRODUCT INTERMEDIATE LITER

## 2017-12-22 PROCEDURE — 25000125 ZZHC RX 250: Performed by: STUDENT IN AN ORGANIZED HEALTH CARE EDUCATION/TRAINING PROGRAM

## 2017-12-22 PROCEDURE — 80202 ASSAY OF VANCOMYCIN: CPT | Performed by: STUDENT IN AN ORGANIZED HEALTH CARE EDUCATION/TRAINING PROGRAM

## 2017-12-22 PROCEDURE — 94645 CONT INHLJ TX EACH ADDL HOUR: CPT

## 2017-12-22 PROCEDURE — 40000014 ZZH STATISTIC ARTERIAL MONITORING DAILY

## 2017-12-22 PROCEDURE — 82330 ASSAY OF CALCIUM: CPT | Performed by: INTERNAL MEDICINE

## 2017-12-22 PROCEDURE — 99291 CRITICAL CARE FIRST HOUR: CPT | Mod: GC | Performed by: INTERNAL MEDICINE

## 2017-12-22 PROCEDURE — 83735 ASSAY OF MAGNESIUM: CPT | Performed by: INTERNAL MEDICINE

## 2017-12-22 PROCEDURE — 85610 PROTHROMBIN TIME: CPT | Performed by: INTERNAL MEDICINE

## 2017-12-22 PROCEDURE — 94640 AIRWAY INHALATION TREATMENT: CPT

## 2017-12-22 PROCEDURE — 85045 AUTOMATED RETICULOCYTE COUNT: CPT | Performed by: INTERNAL MEDICINE

## 2017-12-22 PROCEDURE — 25000132 ZZH RX MED GY IP 250 OP 250 PS 637: Performed by: INTERNAL MEDICINE

## 2017-12-22 PROCEDURE — 20000004 ZZH R&B ICU UMMC

## 2017-12-22 PROCEDURE — 84100 ASSAY OF PHOSPHORUS: CPT | Performed by: STUDENT IN AN ORGANIZED HEALTH CARE EDUCATION/TRAINING PROGRAM

## 2017-12-22 PROCEDURE — 80048 BASIC METABOLIC PNL TOTAL CA: CPT | Performed by: STUDENT IN AN ORGANIZED HEALTH CARE EDUCATION/TRAINING PROGRAM

## 2017-12-22 PROCEDURE — 25000128 H RX IP 250 OP 636: Performed by: INTERNAL MEDICINE

## 2017-12-22 PROCEDURE — 25000131 ZZH RX MED GY IP 250 OP 636 PS 637: Performed by: INTERNAL MEDICINE

## 2017-12-22 PROCEDURE — 94003 VENT MGMT INPAT SUBQ DAY: CPT

## 2017-12-22 PROCEDURE — 85025 COMPLETE CBC W/AUTO DIFF WBC: CPT | Performed by: INTERNAL MEDICINE

## 2017-12-22 PROCEDURE — 83605 ASSAY OF LACTIC ACID: CPT | Performed by: INTERNAL MEDICINE

## 2017-12-22 PROCEDURE — 00000146 ZZHCL STATISTIC GLUCOSE BY METER IP

## 2017-12-22 PROCEDURE — 74000 XR ABDOMEN PORT F1 VW: CPT

## 2017-12-22 PROCEDURE — 87493 C DIFF AMPLIFIED PROBE: CPT | Performed by: INTERNAL MEDICINE

## 2017-12-22 PROCEDURE — 40000275 ZZH STATISTIC RCP TIME EA 10 MIN

## 2017-12-22 PROCEDURE — 83735 ASSAY OF MAGNESIUM: CPT | Performed by: STUDENT IN AN ORGANIZED HEALTH CARE EDUCATION/TRAINING PROGRAM

## 2017-12-22 PROCEDURE — 84100 ASSAY OF PHOSPHORUS: CPT | Performed by: INTERNAL MEDICINE

## 2017-12-22 PROCEDURE — 80053 COMPREHEN METABOLIC PANEL: CPT | Performed by: INTERNAL MEDICINE

## 2017-12-22 PROCEDURE — 71010 XR CHEST PORT 1 VW: CPT

## 2017-12-22 PROCEDURE — 27210995 ZZH RX 272: Performed by: INTERNAL MEDICINE

## 2017-12-22 PROCEDURE — 40000986 XR CHEST PORT 1 VW

## 2017-12-22 RX ORDER — HYDRALAZINE HYDROCHLORIDE 20 MG/ML
5 INJECTION INTRAMUSCULAR; INTRAVENOUS EVERY 6 HOURS PRN
Status: DISCONTINUED | OUTPATIENT
Start: 2017-12-22 | End: 2017-12-23

## 2017-12-22 RX ORDER — LEVOTHYROXINE SODIUM 25 UG/1
50 TABLET ORAL DAILY
Status: DISCONTINUED | OUTPATIENT
Start: 2017-12-23 | End: 2018-01-05 | Stop reason: HOSPADM

## 2017-12-22 RX ORDER — IPRATROPIUM BROMIDE AND ALBUTEROL SULFATE 2.5; .5 MG/3ML; MG/3ML
3 SOLUTION RESPIRATORY (INHALATION) EVERY 4 HOURS PRN
Status: DISCONTINUED | OUTPATIENT
Start: 2017-12-22 | End: 2017-12-28

## 2017-12-22 RX ORDER — CEFEPIME 1 G/50ML
2 INJECTION, SOLUTION INTRAVENOUS EVERY 8 HOURS
Status: DISCONTINUED | OUTPATIENT
Start: 2017-12-22 | End: 2017-12-23

## 2017-12-22 RX ADMIN — POTASSIUM CHLORIDE 20 MEQ: 400 INJECTION, SOLUTION INTRAVENOUS at 05:11

## 2017-12-22 RX ADMIN — MULTIVITAMIN 15 ML: LIQUID ORAL at 09:28

## 2017-12-22 RX ADMIN — IPRATROPIUM BROMIDE AND ALBUTEROL SULFATE 3 ML: .5; 3 SOLUTION RESPIRATORY (INHALATION) at 08:28

## 2017-12-22 RX ADMIN — METHYLPREDNISOLONE 40 MG: 40 INJECTION, POWDER, LYOPHILIZED, FOR SOLUTION INTRAMUSCULAR; INTRAVENOUS at 14:16

## 2017-12-22 RX ADMIN — METRONIDAZOLE 500 MG: 500 INJECTION, SOLUTION INTRAVENOUS at 10:43

## 2017-12-22 RX ADMIN — Medication 3 MG: at 14:16

## 2017-12-22 RX ADMIN — Medication 3 MG: at 09:28

## 2017-12-22 RX ADMIN — POTASSIUM CHLORIDE 20 MEQ: 400 INJECTION, SOLUTION INTRAVENOUS at 04:11

## 2017-12-22 RX ADMIN — Medication 3 MG: at 20:12

## 2017-12-22 RX ADMIN — INSULIN ASPART 2 UNITS: 100 INJECTION, SOLUTION INTRAVENOUS; SUBCUTANEOUS at 20:10

## 2017-12-22 RX ADMIN — PROPOFOL 40 MCG/KG/MIN: 10 INJECTION, EMULSION INTRAVENOUS at 11:21

## 2017-12-22 RX ADMIN — METRONIDAZOLE 500 MG: 500 INJECTION, SOLUTION INTRAVENOUS at 19:00

## 2017-12-22 RX ADMIN — LEVOTHYROXINE SODIUM ANHYDROUS 25 MCG: 100 INJECTION, POWDER, LYOPHILIZED, FOR SOLUTION INTRAVENOUS at 09:15

## 2017-12-22 RX ADMIN — EPOPROSTENOL 20 NG/KG/MIN: 1.5 INJECTION, POWDER, LYOPHILIZED, FOR SOLUTION INTRAVENOUS at 06:08

## 2017-12-22 RX ADMIN — FENTANYL CITRATE 50 MCG: 50 INJECTION, SOLUTION INTRAMUSCULAR; INTRAVENOUS at 14:42

## 2017-12-22 RX ADMIN — POTASSIUM CHLORIDE 20 MEQ: 1.5 POWDER, FOR SOLUTION ORAL at 11:47

## 2017-12-22 RX ADMIN — MINERAL OIL AND WHITE PETROLATUM: 150; 830 OINTMENT OPHTHALMIC at 13:19

## 2017-12-22 RX ADMIN — INSULIN ASPART 1 UNITS: 100 INJECTION, SOLUTION INTRAVENOUS; SUBCUTANEOUS at 01:35

## 2017-12-22 RX ADMIN — INSULIN ASPART 1 UNITS: 100 INJECTION, SOLUTION INTRAVENOUS; SUBCUTANEOUS at 04:00

## 2017-12-22 RX ADMIN — Medication 1 PACKET: at 11:24

## 2017-12-22 RX ADMIN — CEFEPIME 2 G: 1 INJECTION, SOLUTION INTRAVENOUS at 18:59

## 2017-12-22 RX ADMIN — VANCOMYCIN HYDROCHLORIDE 500 MG: 500 INJECTION, POWDER, LYOPHILIZED, FOR SOLUTION INTRAVENOUS at 20:33

## 2017-12-22 RX ADMIN — INSULIN ASPART 1 UNITS: 100 INJECTION, SOLUTION INTRAVENOUS; SUBCUTANEOUS at 15:30

## 2017-12-22 RX ADMIN — VANCOMYCIN HYDROCHLORIDE 1250 MG: 10 INJECTION, POWDER, LYOPHILIZED, FOR SOLUTION INTRAVENOUS at 04:06

## 2017-12-22 RX ADMIN — INSULIN ASPART 1 UNITS: 100 INJECTION, SOLUTION INTRAVENOUS; SUBCUTANEOUS at 11:40

## 2017-12-22 RX ADMIN — FENTANYL CITRATE 50 MCG: 50 INJECTION, SOLUTION INTRAMUSCULAR; INTRAVENOUS at 21:02

## 2017-12-22 RX ADMIN — METOPROLOL TARTRATE 5 MG: 5 INJECTION INTRAVENOUS at 14:49

## 2017-12-22 RX ADMIN — METHYLPREDNISOLONE 40 MG: 40 INJECTION, POWDER, LYOPHILIZED, FOR SOLUTION INTRAMUSCULAR; INTRAVENOUS at 05:46

## 2017-12-22 RX ADMIN — INSULIN ASPART 1 UNITS: 100 INJECTION, SOLUTION INTRAVENOUS; SUBCUTANEOUS at 09:11

## 2017-12-22 RX ADMIN — Medication 20 MG: at 10:20

## 2017-12-22 RX ADMIN — FOLIC ACID 1 MG: 5 INJECTION, SOLUTION INTRAMUSCULAR; INTRAVENOUS; SUBCUTANEOUS at 09:01

## 2017-12-22 RX ADMIN — Medication 20 MG: at 21:04

## 2017-12-22 RX ADMIN — PANTOPRAZOLE SODIUM 40 MG: 40 INJECTION, POWDER, FOR SOLUTION INTRAVENOUS at 08:57

## 2017-12-22 RX ADMIN — Medication 125 MG: at 20:12

## 2017-12-22 RX ADMIN — POTASSIUM CHLORIDE 20 MEQ: 1.5 POWDER, FOR SOLUTION ORAL at 16:50

## 2017-12-22 RX ADMIN — PROPOFOL 40 MCG/KG/MIN: 10 INJECTION, EMULSION INTRAVENOUS at 19:07

## 2017-12-22 RX ADMIN — THIAMINE HYDROCHLORIDE 100 MG: 100 INJECTION, SOLUTION INTRAMUSCULAR; INTRAVENOUS at 19:01

## 2017-12-22 RX ADMIN — Medication 150 MCG/HR: at 04:00

## 2017-12-22 RX ADMIN — MINERAL OIL AND WHITE PETROLATUM: 150; 830 OINTMENT OPHTHALMIC at 05:11

## 2017-12-22 RX ADMIN — ACETAZOLAMIDE 500 MG: 500 INJECTION, POWDER, LYOPHILIZED, FOR SOLUTION INTRAVENOUS at 00:31

## 2017-12-22 RX ADMIN — Medication 6 MG/HR: at 14:31

## 2017-12-22 RX ADMIN — PROPOFOL 20 MCG/KG/MIN: 10 INJECTION, EMULSION INTRAVENOUS at 00:36

## 2017-12-22 RX ADMIN — MIDAZOLAM 4 MG: 1 INJECTION INTRAMUSCULAR; INTRAVENOUS at 21:03

## 2017-12-22 RX ADMIN — OLANZAPINE 5 MG: 5 TABLET, ORALLY DISINTEGRATING ORAL at 20:35

## 2017-12-22 RX ADMIN — MIDAZOLAM 4 MG: 1 INJECTION INTRAMUSCULAR; INTRAVENOUS at 10:19

## 2017-12-22 RX ADMIN — EPOPROSTENOL 20 NG/KG/MIN: 1.5 INJECTION, POWDER, LYOPHILIZED, FOR SOLUTION INTRAVENOUS at 11:47

## 2017-12-22 RX ADMIN — MIDAZOLAM 4 MG: 1 INJECTION INTRAMUSCULAR; INTRAVENOUS at 14:42

## 2017-12-22 RX ADMIN — Medication 20 MG: at 14:43

## 2017-12-22 RX ADMIN — CALCIUM GLUCONATE 2 G: 94 INJECTION, SOLUTION INTRAVENOUS at 13:29

## 2017-12-22 RX ADMIN — POTASSIUM CHLORIDE 40 MEQ: 1.5 POWDER, FOR SOLUTION ORAL at 08:57

## 2017-12-22 RX ADMIN — INSULIN ASPART 2 UNITS: 100 INJECTION, SOLUTION INTRAVENOUS; SUBCUTANEOUS at 19:15

## 2017-12-22 RX ADMIN — Medication 150 MCG/HR: at 21:39

## 2017-12-22 RX ADMIN — THIAMINE HYDROCHLORIDE 100 MG: 100 INJECTION, SOLUTION INTRAMUSCULAR; INTRAVENOUS at 08:58

## 2017-12-22 RX ADMIN — HYDRALAZINE HYDROCHLORIDE 5 MG: 20 INJECTION INTRAMUSCULAR; INTRAVENOUS at 20:59

## 2017-12-22 RX ADMIN — POTASSIUM CHLORIDE 20 MEQ: 1.5 POWDER, FOR SOLUTION ORAL at 21:41

## 2017-12-22 RX ADMIN — FENTANYL CITRATE 50 MCG: 50 INJECTION, SOLUTION INTRAMUSCULAR; INTRAVENOUS at 10:19

## 2017-12-22 RX ADMIN — Medication 150 MCG/HR: at 13:15

## 2017-12-22 RX ADMIN — Medication 1 PACKET: at 09:07

## 2017-12-22 RX ADMIN — ENOXAPARIN SODIUM 40 MG: 40 INJECTION SUBCUTANEOUS at 10:43

## 2017-12-22 RX ADMIN — POTASSIUM PHOSPHATE, MONOBASIC AND POTASSIUM PHOSPHATE, DIBASIC 20 MMOL: 224; 236 INJECTION, SOLUTION INTRAVENOUS at 12:03

## 2017-12-22 RX ADMIN — METOPROLOL TARTRATE 5 MG: 5 INJECTION INTRAVENOUS at 10:25

## 2017-12-22 RX ADMIN — METOPROLOL TARTRATE 5 MG: 5 INJECTION INTRAVENOUS at 10:46

## 2017-12-22 RX ADMIN — CEFEPIME 2 G: 1 INJECTION, SOLUTION INTRAVENOUS at 11:16

## 2017-12-22 NOTE — PLAN OF CARE
Problem: Patient Care Overview  Goal: Plan of Care/Patient Progress Review  Outcome: No Change  D: Overdose c/b respiratory failure, ARDS picture. I/A: Pt remains intubated/sedated. PEEP 11, FiO2 35%. Peak pressures low 20s. Full strength flolan neb. Tmax overnight 100.6 tympanic. Adequate urine output. Diuresed once with diamox. Free water flushes increased to 75 q2h for Na 149. K replaced for 3.3. Ical 4.4, replace? Fentanyl, versed, and propofol gtts infusing for pain/sedation. Pt moving all extremities but not to command. No tracking but will open eyes with vigorous stimulation. P: Wean vent support and sedation as pt tolerates. Monitor for changes in condition.

## 2017-12-22 NOTE — PROGRESS NOTES
RT Note:      Patient is currently on mechanical ventilation. Breath sounds are coarse, but clear/diminished with suctioning. Small amount of thin clear secretions. Patient does not take respiratory medications at home. Patient is not in distress. Duonebs changed to Q4H PRN. Will continue to assess patient Q4H.       Erica Butler, RRT

## 2017-12-22 NOTE — PLAN OF CARE
Problem: Overdose, Ingestion/Inhalants (Adult)  Goal: Signs and Symptoms of Listed Potential Problems Will be Absent, Minimized or Managed (Overdose, Ingestion/Inhalants)  Signs and symptoms of listed potential problems will be absent, minimized or managed by discharge/transition of care (reference Overdose, Ingestion/Inhalants (Adult) CPG).   Outcome: Improving  D: Pt on high vent support, NMB gtt, versed 10mg/hr, fent 150 mcg/hr, prop 20 mcg/kg/min with full strength flolan nebs.  Vent at 420/27/13 40%. Alkalotic 2/2 metabolic compensation. LS clear anteriorly, with R dim, posteriorly coarse.  Apical pansystolic murmur appreciated, MD aware.  BS +, stooling. Hgb 7.7. K 3.7  R>L pupils, CT overnoc neg.   I: Wean paralytic off, vent settings weaned to 460/22/11 35%.  Lasix gtt dced, diamox  500 mg X 1.  Versed gtt weaned to 6.  40 mg K PO. WOC Rn consult for 2 suspected pressure ulcers. Advance TF to goal, K rechecked, replaced for 3.3.  to bedside, updated by RN & MD. Signed for open notes and requests AM update phone call.   A:  Pt moving all four extremities, intermittently withdraws, no commands. Pupils brisk with persistent slight anisocoria. WO recommendations, see note. Fluid balance net even today. 600 cc stool out since midnight.   P: Continue to wean sedation as tolerated. Wound care per orders. Recheck ABG to assess for pH balance/ alkalosis after vent changes.  Recheck lytes, Hgb levels. Continue MICU POC.

## 2017-12-22 NOTE — PHARMACY-VANCOMYCIN DOSING SERVICE
Pharmacy Vancomycin Note  Date of Service 2017  Patient's  1970   47 year old, female    Indication: Bacteremia  Goal Trough Level: 15-20 mg/L  Day of Therapy: 2  Current Vancomycin regimen:  1250 mg IV q12h    Current estimated CrCl = CrCl cannot be calculated (Unknown ideal weight.).    Creatinine for last 3 days  2017:  3:43 AM Creatinine 1.36 mg/dL  2017:  2:25 AM Creatinine 1.28 mg/dL  2017:  3:24 AM Creatinine 1.18 mg/dL;  3:22 PM Creatinine 0.92 mg/dL    Recent Vancomycin Levels (past 3 days)  2017:  3:22 PM Vancomycin Level 29.0 mg/L    Vancomycin IV Administrations (past 72 hours)                   vancomycin (VANCOCIN) 1,250 mg in NaCl 0.9 % 250 mL intermittent infusion (mg) 1,250 mg New Bag 17 0406     1,250 mg New Bag 17 1801    vancomycin (VANCOCIN) 1,250 mg in NaCl 0.9 % 250 mL intermittent infusion (mg) 1,250 mg New Bag 17 0508                Nephrotoxins and other renal medications (Future)    Start     Dose/Rate Route Frequency Ordered Stop    17 0406  vancomycin (VANCOCIN) 1,250 mg in NaCl 0.9 % 250 mL intermittent infusion      1,250 mg  over 90 Minutes Intravenous EVERY 24 HOURS 17 1639               Contrast Orders - past 72 hours     None          Interpretation of levels and current regimen:  Trough level is  Supratherapeutic    Has serum creatinine changed > 50% in last 72 hours: No    Urine output:  good urine output    Renal Function: Stable    Plan:  1.  Decrease Dose to vancomycin 1250mg IV q24h  2.  Pharmacy will check trough levels as appropriate in 1-3 Days.    3. Serum creatinine levels will be ordered daily for the first week of therapy and at least twice weekly for subsequent weeks.      Marian Bhardwaj, PharmD        .

## 2017-12-23 ENCOUNTER — APPOINTMENT (OUTPATIENT)
Dept: GENERAL RADIOLOGY | Facility: CLINIC | Age: 47
DRG: 917 | End: 2017-12-23
Attending: SURGERY
Payer: COMMERCIAL

## 2017-12-23 LAB
ALBUMIN SERPL-MCNC: 2.8 G/DL (ref 3.4–5)
ALP SERPL-CCNC: 814 U/L (ref 40–150)
ALT SERPL W P-5'-P-CCNC: 151 U/L (ref 0–50)
ANION GAP SERPL CALCULATED.3IONS-SCNC: 5 MMOL/L (ref 3–14)
ANION GAP SERPL CALCULATED.3IONS-SCNC: 8 MMOL/L (ref 3–14)
ANION GAP SERPL CALCULATED.3IONS-SCNC: 9 MMOL/L (ref 3–14)
ANISOCYTOSIS BLD QL SMEAR: ABNORMAL
AST SERPL W P-5'-P-CCNC: 268 U/L (ref 0–45)
BACTERIA SPEC CULT: NO GROWTH
BASE EXCESS BLDA CALC-SCNC: 4.7 MMOL/L
BASE EXCESS BLDA CALC-SCNC: 6.7 MMOL/L
BASOPHILS # BLD AUTO: 0 10E9/L (ref 0–0.2)
BASOPHILS NFR BLD AUTO: 0 %
BILIRUB SERPL-MCNC: 8.4 MG/DL (ref 0.2–1.3)
BUN SERPL-MCNC: 63 MG/DL (ref 7–30)
BUN SERPL-MCNC: 66 MG/DL (ref 7–30)
BUN SERPL-MCNC: 67 MG/DL (ref 7–30)
CALCIUM SERPL-MCNC: 7.8 MG/DL (ref 8.5–10.1)
CALCIUM SERPL-MCNC: 8.1 MG/DL (ref 8.5–10.1)
CALCIUM SERPL-MCNC: 8.2 MG/DL (ref 8.5–10.1)
CHLORIDE SERPL-SCNC: 111 MMOL/L (ref 94–109)
CHLORIDE SERPL-SCNC: 112 MMOL/L (ref 94–109)
CHLORIDE SERPL-SCNC: 113 MMOL/L (ref 94–109)
CO2 SERPL-SCNC: 27 MMOL/L (ref 20–32)
CO2 SERPL-SCNC: 28 MMOL/L (ref 20–32)
CO2 SERPL-SCNC: 30 MMOL/L (ref 20–32)
CREAT SERPL-MCNC: 0.64 MG/DL (ref 0.52–1.04)
CREAT SERPL-MCNC: 0.65 MG/DL (ref 0.52–1.04)
CREAT SERPL-MCNC: 0.67 MG/DL (ref 0.52–1.04)
DIFFERENTIAL METHOD BLD: ABNORMAL
EOSINOPHIL # BLD AUTO: 0 10E9/L (ref 0–0.7)
EOSINOPHIL NFR BLD AUTO: 0 %
ERYTHROCYTE [DISTWIDTH] IN BLOOD BY AUTOMATED COUNT: 19 % (ref 10–15)
GFR SERPL CREATININE-BSD FRML MDRD: >90 ML/MIN/1.7M2
GLUCOSE BLDC GLUCOMTR-MCNC: 140 MG/DL (ref 70–99)
GLUCOSE BLDC GLUCOMTR-MCNC: 175 MG/DL (ref 70–99)
GLUCOSE BLDC GLUCOMTR-MCNC: 180 MG/DL (ref 70–99)
GLUCOSE BLDC GLUCOMTR-MCNC: 193 MG/DL (ref 70–99)
GLUCOSE BLDC GLUCOMTR-MCNC: 195 MG/DL (ref 70–99)
GLUCOSE BLDC GLUCOMTR-MCNC: 212 MG/DL (ref 70–99)
GLUCOSE BLDC GLUCOMTR-MCNC: 232 MG/DL (ref 70–99)
GLUCOSE SERPL-MCNC: 174 MG/DL (ref 70–99)
GLUCOSE SERPL-MCNC: 197 MG/DL (ref 70–99)
GLUCOSE SERPL-MCNC: 232 MG/DL (ref 70–99)
HCO3 BLD-SCNC: 28 MMOL/L (ref 21–28)
HCO3 BLD-SCNC: 31 MMOL/L (ref 21–28)
HCT VFR BLD AUTO: 27.7 % (ref 35–47)
HGB BLD-MCNC: 8.9 G/DL (ref 11.7–15.7)
LACTATE BLD-SCNC: 1.5 MMOL/L (ref 0.7–2)
LACTATE BLD-SCNC: 1.6 MMOL/L (ref 0.7–2)
LACTATE BLD-SCNC: 1.7 MMOL/L (ref 0.7–2)
LYMPHOCYTES # BLD AUTO: 0.3 10E9/L (ref 0.8–5.3)
LYMPHOCYTES NFR BLD AUTO: 0.9 %
MAGNESIUM SERPL-MCNC: 2.2 MG/DL (ref 1.6–2.3)
MAGNESIUM SERPL-MCNC: 2.4 MG/DL (ref 1.6–2.3)
MAGNESIUM SERPL-MCNC: 2.5 MG/DL (ref 1.6–2.3)
MAGNESIUM SERPL-MCNC: 2.5 MG/DL (ref 1.6–2.3)
MCH RBC QN AUTO: 30.4 PG (ref 26.5–33)
MCHC RBC AUTO-ENTMCNC: 32.1 G/DL (ref 31.5–36.5)
MCV RBC AUTO: 95 FL (ref 78–100)
MONOCYTES # BLD AUTO: 1.1 10E9/L (ref 0–1.3)
MONOCYTES NFR BLD AUTO: 3.6 %
MYELOCYTES # BLD: 1.1 10E9/L
MYELOCYTES NFR BLD MANUAL: 3.6 %
NEUTROPHILS # BLD AUTO: 27.1 10E9/L (ref 1.6–8.3)
NEUTROPHILS NFR BLD AUTO: 91.9 %
NRBC # BLD AUTO: 0.5 10*3/UL
NRBC BLD AUTO-RTO: 2 /100
O2/TOTAL GAS SETTING VFR VENT: 35 %
O2/TOTAL GAS SETTING VFR VENT: 35 %
PCO2 BLD: 37 MM HG (ref 35–45)
PCO2 BLD: 40 MM HG (ref 35–45)
PH BLD: 7.49 PH (ref 7.35–7.45)
PH BLD: 7.5 PH (ref 7.35–7.45)
PHOSPHATE SERPL-MCNC: 1.9 MG/DL (ref 2.5–4.5)
PHOSPHATE SERPL-MCNC: 2.6 MG/DL (ref 2.5–4.5)
PHOSPHATE SERPL-MCNC: 2.6 MG/DL (ref 2.5–4.5)
PLATELET # BLD AUTO: 149 10E9/L (ref 150–450)
PLATELET # BLD EST: ABNORMAL 10*3/UL
PO2 BLD: 69 MM HG (ref 80–105)
PO2 BLD: 85 MM HG (ref 80–105)
POIKILOCYTOSIS BLD QL SMEAR: SLIGHT
POLYCHROMASIA BLD QL SMEAR: SLIGHT
POTASSIUM SERPL-SCNC: 4 MMOL/L (ref 3.4–5.3)
POTASSIUM SERPL-SCNC: 4 MMOL/L (ref 3.4–5.3)
POTASSIUM SERPL-SCNC: 4.3 MMOL/L (ref 3.4–5.3)
PROT SERPL-MCNC: 5.5 G/DL (ref 6.8–8.8)
RBC # BLD AUTO: 2.93 10E12/L (ref 3.8–5.2)
SODIUM SERPL-SCNC: 147 MMOL/L (ref 133–144)
SODIUM SERPL-SCNC: 148 MMOL/L (ref 133–144)
SODIUM SERPL-SCNC: 149 MMOL/L (ref 133–144)
SPECIMEN SOURCE: NORMAL
TARGETS BLD QL SMEAR: SLIGHT
TRIGL SERPL-MCNC: 388 MG/DL
WBC # BLD AUTO: 29.5 10E9/L (ref 4–11)

## 2017-12-23 PROCEDURE — 82803 BLOOD GASES ANY COMBINATION: CPT | Performed by: INTERNAL MEDICINE

## 2017-12-23 PROCEDURE — 25000128 H RX IP 250 OP 636: Performed by: INTERNAL MEDICINE

## 2017-12-23 PROCEDURE — 25000132 ZZH RX MED GY IP 250 OP 250 PS 637: Performed by: STUDENT IN AN ORGANIZED HEALTH CARE EDUCATION/TRAINING PROGRAM

## 2017-12-23 PROCEDURE — 84100 ASSAY OF PHOSPHORUS: CPT | Performed by: STUDENT IN AN ORGANIZED HEALTH CARE EDUCATION/TRAINING PROGRAM

## 2017-12-23 PROCEDURE — 94003 VENT MGMT INPAT SUBQ DAY: CPT

## 2017-12-23 PROCEDURE — 40000275 ZZH STATISTIC RCP TIME EA 10 MIN

## 2017-12-23 PROCEDURE — 25000128 H RX IP 250 OP 636: Performed by: STUDENT IN AN ORGANIZED HEALTH CARE EDUCATION/TRAINING PROGRAM

## 2017-12-23 PROCEDURE — 94640 AIRWAY INHALATION TREATMENT: CPT

## 2017-12-23 PROCEDURE — 71010 XR CHEST PORT 1 VW: CPT

## 2017-12-23 PROCEDURE — 25000132 ZZH RX MED GY IP 250 OP 250 PS 637: Performed by: INTERNAL MEDICINE

## 2017-12-23 PROCEDURE — 80048 BASIC METABOLIC PNL TOTAL CA: CPT | Performed by: STUDENT IN AN ORGANIZED HEALTH CARE EDUCATION/TRAINING PROGRAM

## 2017-12-23 PROCEDURE — 25000125 ZZHC RX 250: Performed by: STUDENT IN AN ORGANIZED HEALTH CARE EDUCATION/TRAINING PROGRAM

## 2017-12-23 PROCEDURE — 27210429 ZZH NUTRITION PRODUCT INTERMEDIATE LITER

## 2017-12-23 PROCEDURE — 25000125 ZZHC RX 250: Performed by: INTERNAL MEDICINE

## 2017-12-23 PROCEDURE — 83605 ASSAY OF LACTIC ACID: CPT | Performed by: INTERNAL MEDICINE

## 2017-12-23 PROCEDURE — 83735 ASSAY OF MAGNESIUM: CPT | Performed by: INTERNAL MEDICINE

## 2017-12-23 PROCEDURE — 94645 CONT INHLJ TX EACH ADDL HOUR: CPT

## 2017-12-23 PROCEDURE — 20000004 ZZH R&B ICU UMMC

## 2017-12-23 PROCEDURE — 00000146 ZZHCL STATISTIC GLUCOSE BY METER IP

## 2017-12-23 PROCEDURE — 40000014 ZZH STATISTIC ARTERIAL MONITORING DAILY

## 2017-12-23 PROCEDURE — 83735 ASSAY OF MAGNESIUM: CPT | Performed by: STUDENT IN AN ORGANIZED HEALTH CARE EDUCATION/TRAINING PROGRAM

## 2017-12-23 PROCEDURE — 84100 ASSAY OF PHOSPHORUS: CPT | Performed by: INTERNAL MEDICINE

## 2017-12-23 PROCEDURE — 99291 CRITICAL CARE FIRST HOUR: CPT | Mod: GC | Performed by: INTERNAL MEDICINE

## 2017-12-23 PROCEDURE — 85025 COMPLETE CBC W/AUTO DIFF WBC: CPT | Performed by: INTERNAL MEDICINE

## 2017-12-23 PROCEDURE — 27210913 ZZH NUTRITION PRODUCT INTERMEDIATE PACKET

## 2017-12-23 PROCEDURE — 84478 ASSAY OF TRIGLYCERIDES: CPT | Performed by: INTERNAL MEDICINE

## 2017-12-23 PROCEDURE — 80053 COMPREHEN METABOLIC PANEL: CPT | Performed by: INTERNAL MEDICINE

## 2017-12-23 RX ORDER — DEXMEDETOMIDINE HYDROCHLORIDE 4 UG/ML
.2-.7 INJECTION, SOLUTION INTRAVENOUS CONTINUOUS
Status: DISCONTINUED | OUTPATIENT
Start: 2017-12-23 | End: 2017-12-25

## 2017-12-23 RX ORDER — HYDRALAZINE HYDROCHLORIDE 20 MG/ML
10 INJECTION INTRAMUSCULAR; INTRAVENOUS EVERY 6 HOURS PRN
Status: DISCONTINUED | OUTPATIENT
Start: 2017-12-23 | End: 2018-01-05 | Stop reason: HOSPADM

## 2017-12-23 RX ORDER — MIDAZOLAM (PF) 1 MG/ML IN 0.9 % SODIUM CHLORIDE INTRAVENOUS SOLUTION
1-4 CONTINUOUS
Status: DISCONTINUED | OUTPATIENT
Start: 2017-12-23 | End: 2017-12-24

## 2017-12-23 RX ADMIN — MIDAZOLAM 4 MG: 1 INJECTION INTRAMUSCULAR; INTRAVENOUS at 10:29

## 2017-12-23 RX ADMIN — Medication 5 MG/HR: at 16:12

## 2017-12-23 RX ADMIN — INSULIN ASPART 2 UNITS: 100 INJECTION, SOLUTION INTRAVENOUS; SUBCUTANEOUS at 03:41

## 2017-12-23 RX ADMIN — MIDAZOLAM 4 MG: 1 INJECTION INTRAMUSCULAR; INTRAVENOUS at 21:08

## 2017-12-23 RX ADMIN — LEVOTHYROXINE SODIUM 50 MCG: 25 TABLET ORAL at 07:36

## 2017-12-23 RX ADMIN — Medication 150 MCG/HR: at 15:18

## 2017-12-23 RX ADMIN — FOLIC ACID 1 MG: 5 INJECTION, SOLUTION INTRAMUSCULAR; INTRAVENOUS; SUBCUTANEOUS at 08:49

## 2017-12-23 RX ADMIN — INSULIN ASPART 2 UNITS: 100 INJECTION, SOLUTION INTRAVENOUS; SUBCUTANEOUS at 07:52

## 2017-12-23 RX ADMIN — MULTIVITAMIN 15 ML: LIQUID ORAL at 07:37

## 2017-12-23 RX ADMIN — Medication 125 MG: at 11:12

## 2017-12-23 RX ADMIN — Medication 3 MG: at 07:37

## 2017-12-23 RX ADMIN — DEXMEDETOMIDINE HYDROCHLORIDE 0.7 MCG/KG/HR: 4 INJECTION, SOLUTION INTRAVENOUS at 21:58

## 2017-12-23 RX ADMIN — Medication 1 PACKET: at 11:11

## 2017-12-23 RX ADMIN — METRONIDAZOLE 500 MG: 500 INJECTION, SOLUTION INTRAVENOUS at 17:57

## 2017-12-23 RX ADMIN — METHYLPREDNISOLONE 40 MG: 40 INJECTION, POWDER, LYOPHILIZED, FOR SOLUTION INTRAMUSCULAR; INTRAVENOUS at 06:48

## 2017-12-23 RX ADMIN — INSULIN ASPART 1 UNITS: 100 INJECTION, SOLUTION INTRAVENOUS; SUBCUTANEOUS at 00:35

## 2017-12-23 RX ADMIN — METHYLPREDNISOLONE 40 MG: 40 INJECTION, POWDER, LYOPHILIZED, FOR SOLUTION INTRAMUSCULAR; INTRAVENOUS at 15:00

## 2017-12-23 RX ADMIN — CARVEDILOL 6.25 MG: 25 TABLET, FILM COATED ORAL at 08:56

## 2017-12-23 RX ADMIN — THIAMINE HYDROCHLORIDE 100 MG: 100 INJECTION, SOLUTION INTRAMUSCULAR; INTRAVENOUS at 20:12

## 2017-12-23 RX ADMIN — Medication 3 MG: at 20:11

## 2017-12-23 RX ADMIN — CARVEDILOL 6.25 MG: 25 TABLET, FILM COATED ORAL at 20:11

## 2017-12-23 RX ADMIN — INSULIN ASPART 1 UNITS: 100 INJECTION, SOLUTION INTRAVENOUS; SUBCUTANEOUS at 20:11

## 2017-12-23 RX ADMIN — POTASSIUM CHLORIDE 20 MEQ: 1.5 POWDER, FOR SOLUTION ORAL at 06:48

## 2017-12-23 RX ADMIN — MAGNESIUM SULFATE IN DEXTROSE 1 G: 10 INJECTION, SOLUTION INTRAVENOUS at 14:36

## 2017-12-23 RX ADMIN — THIAMINE HYDROCHLORIDE 100 MG: 100 INJECTION, SOLUTION INTRAMUSCULAR; INTRAVENOUS at 07:36

## 2017-12-23 RX ADMIN — Medication 1 PACKET: at 08:49

## 2017-12-23 RX ADMIN — Medication 6 MG/HR: at 03:28

## 2017-12-23 RX ADMIN — CEFEPIME 2 G: 1 INJECTION, SOLUTION INTRAVENOUS at 10:27

## 2017-12-23 RX ADMIN — FENTANYL CITRATE 50 MCG: 50 INJECTION, SOLUTION INTRAMUSCULAR; INTRAVENOUS at 12:20

## 2017-12-23 RX ADMIN — Medication 200 MCG/HR: at 07:33

## 2017-12-23 RX ADMIN — DEXMEDETOMIDINE HYDROCHLORIDE 0.4 MCG/KG/HR: 4 INJECTION, SOLUTION INTRAVENOUS at 12:34

## 2017-12-23 RX ADMIN — FENTANYL CITRATE 50 MCG: 50 INJECTION, SOLUTION INTRAMUSCULAR; INTRAVENOUS at 10:29

## 2017-12-23 RX ADMIN — POTASSIUM CHLORIDE 20 MEQ: 1.5 POWDER, FOR SOLUTION ORAL at 14:39

## 2017-12-23 RX ADMIN — Medication 125 MG: at 20:12

## 2017-12-23 RX ADMIN — CEFEPIME 2 G: 1 INJECTION, SOLUTION INTRAVENOUS at 03:24

## 2017-12-23 RX ADMIN — INSULIN ASPART 2 UNITS: 100 INJECTION, SOLUTION INTRAVENOUS; SUBCUTANEOUS at 23:48

## 2017-12-23 RX ADMIN — VANCOMYCIN HYDROCHLORIDE 500 MG: 500 INJECTION, POWDER, LYOPHILIZED, FOR SOLUTION INTRAVENOUS at 07:38

## 2017-12-23 RX ADMIN — Medication 3 MG: at 14:50

## 2017-12-23 RX ADMIN — PANTOPRAZOLE SODIUM 40 MG: 40 INJECTION, POWDER, FOR SOLUTION INTRAVENOUS at 07:37

## 2017-12-23 RX ADMIN — METRONIDAZOLE 500 MG: 500 INJECTION, SOLUTION INTRAVENOUS at 03:24

## 2017-12-23 RX ADMIN — Medication 6 MG/HR: at 12:19

## 2017-12-23 RX ADMIN — ENOXAPARIN SODIUM 40 MG: 40 INJECTION SUBCUTANEOUS at 10:30

## 2017-12-23 RX ADMIN — VANCOMYCIN HYDROCHLORIDE 1250 MG: 10 INJECTION, POWDER, LYOPHILIZED, FOR SOLUTION INTRAVENOUS at 04:37

## 2017-12-23 RX ADMIN — MIDAZOLAM 4 MG: 1 INJECTION INTRAMUSCULAR; INTRAVENOUS at 16:31

## 2017-12-23 RX ADMIN — METHYLPREDNISOLONE 40 MG: 40 INJECTION, POWDER, LYOPHILIZED, FOR SOLUTION INTRAMUSCULAR; INTRAVENOUS at 00:29

## 2017-12-23 RX ADMIN — METRONIDAZOLE 500 MG: 500 INJECTION, SOLUTION INTRAVENOUS at 10:59

## 2017-12-23 RX ADMIN — OLANZAPINE 5 MG: 5 TABLET, ORALLY DISINTEGRATING ORAL at 07:37

## 2017-12-23 RX ADMIN — INSULIN ASPART 1 UNITS: 100 INJECTION, SOLUTION INTRAVENOUS; SUBCUTANEOUS at 16:24

## 2017-12-23 RX ADMIN — Medication 125 MG: at 07:37

## 2017-12-23 RX ADMIN — PROPOFOL 40 MCG/KG/MIN: 10 INJECTION, EMULSION INTRAVENOUS at 00:45

## 2017-12-23 RX ADMIN — POTASSIUM PHOSPHATE, MONOBASIC AND POTASSIUM PHOSPHATE, DIBASIC 20 MMOL: 224; 236 INJECTION, SOLUTION INTRAVENOUS at 07:47

## 2017-12-23 RX ADMIN — INSULIN ASPART 2 UNITS: 100 INJECTION, SOLUTION INTRAVENOUS; SUBCUTANEOUS at 11:10

## 2017-12-23 RX ADMIN — Medication 125 MG: at 16:24

## 2017-12-23 RX ADMIN — METHYLPREDNISOLONE 40 MG: 40 INJECTION, POWDER, LYOPHILIZED, FOR SOLUTION INTRAMUSCULAR; INTRAVENOUS at 22:04

## 2017-12-23 RX ADMIN — HYDRALAZINE HYDROCHLORIDE 5 MG: 20 INJECTION INTRAMUSCULAR; INTRAVENOUS at 06:48

## 2017-12-23 RX ADMIN — OLANZAPINE 5 MG: 5 TABLET, ORALLY DISINTEGRATING ORAL at 20:11

## 2017-12-23 NOTE — PROGRESS NOTES
Medical ICU Progress Note    Patient:  Ana Laura Wharton   Date of birth 1970, Medical record number [id number]  Date of Visit:  12/22/2017  Date of Admission: 12/13/2017    Ana Laura Wharton is a 48 yo female with hx of bipolar disorder with depression, polysubstance abuse, & prior suicide attempts, admitted to [facility] MICU  Following calcium channel blocker (CCB) overdose, tylenol overdose & alcohol overdose. Course complicated by lactic acidosis, hypoventilation, acute renal failure, & severe hypotension/vasogenic shock. Currently intubated, sedated, & proned. Now off paralytic and weaning sedation      Concise Assessment and Plan  # Suicide Attempt by acetaminophen, ETOH, and amlodipine  - Continue to monitor       # ARDS 2/2 aspiration  - Continue ARDSnet protocol  - discontinue paralysis; wean versed  - Continued Flolan      # Sepsis 2/2 Sinusitis  Levofloxacin, vancomycin for sinusitis, concern for worsening sepsis; will reassess tomorrow      NEURO  Sedation/Analgesia:  Midazolam gtt; will slow wean today and hold on others to not remove too many at once  Fentanyl gtt   Propofol gtt       Paralytic: d/c paralytic 12/21      Bipolar I disorder with anxiety & depression  Suicide attempt with polysubstance abuse (amlodipine, nyquil (acetaminophen), alcohol (vodka))  Completed poison control recommendations of calcium drip with goal of ical 10. Further, completed high dose insulin drip and will trend BPs.   - Manage OD as per below  - Will monitor for withdrawal symptoms once weaned from sedation (ETOH 0.17 at OSH). IV thiamine & folate  - Holding home zyprexa & remeron.  - Psych consult for OD when awake      Agitation: Patient at high risk of alcohol & benzo withdrawal  - Slow wean of sedation when medically appropriate      RESPIRATORY  Ventilation Mode: CMV/AC  FiO2 (%): 40 %  Rate Set (breaths/minute): 27 breaths/min  Tidal Volume Set (mL): 410 mL  PEEP (cm H2O): 13 cmH2O  Oxygen Concentration (%): 40 %  Resp:  27      ARDS 2/2 aspiration   Acute Hypoxic  Respiratory Failure:  Acute Hypercarbic Respiratory Failure  Due initially to hypoventilation due to overdose, now due to V/Q mismatch in the setting of ARDS. ARDS net vent settings for lung protection   - Inhaled Flolan at half strength for increased oxygenation, will work on weaning going forward, will taper tonight  - Vent settings as per above   - daily ABG  - Solumedrol in the setting of recurrent hypercarbia and concern for air trapping in the setting of possible COPD.   - Duonebs for air trapping      CARDIOVASCULAR  Hypotension/Vasodilatory shock/Vasoplegia, Resolved:   Required pressors. Side effect of CCB overdose. ECHO shows normal EF of 70%. Normal RV function. She has been titrated off norepi and has been hemodynamically stable. Further, she has been titrated off her insulin and pressures have been stable.   - Continue stress dose steroids until post ARDS      Paroxysmal atrial fibrillation: Briefly in Afib with RVR this 12/20. Received metoprolol x 2. Converted to NSR. Will hopefully improve once fluid status improves  - No acute need for management at this time       Intermittent SVT  Patient has had SVT with rates in the 190s intermittently and spontaneously resolves. Hemodynamically stable during episodes of SVT  - Patient has been stable without receiving further medications      RENAL / FLUIDS / ELECTROLYTES  Baseline creatinine: 0.7-0.8.       Acute Renal Failure 2/2 calcium channel blocker overdose:   As high as 1.6.  Due to CCB overdose. Volume overload, pulmonary edema. Cr slowly improving with diuresis & forward flow  - Lasix gtt held today  - Metolazone 10 mg held today   - I/Os, Monitor Cr, UOP  - Monitoring lytes, replace  - will slow diuresis goal today given shifting likely contributed to lactic acidosis      Lactic Acidosis/AGMA, resolved: Due to hypoperfusion. Lactate may continue to be elevated until after CCB has cleared. Likely acute rise  last night was 2/2 volume shifting given massive diuresis over last 2 days coupled with OD.   - Continue to trend  - Goal of pH>7.25, bicarb gtt as necessary     Metabolic alkalosis  Likely 2/2 volume contraction in setting of aggressive diuresis while on ventilator. Will plan to switch lasix gtt to diamox for today and reassess tomorrow  -hold lasix, metolazone  -diamox 500mg x2 doses      GASTROINTESTINAL    Acetaminophen overdose  Elevated Transaminases  Found next to bottle of Nyquil. AST/ALTs now normal s/p NAC gtt.   - Trend LFTs, INR      OG: OG to LIS      Hx of C.diff: Hx of fecal transplant. Has had formed stool      Tube Feeds: Re-evaluate daily      INFECTIOUS DISEASE     Sepsis 2/2 C diff  Patient has new fever and rising leukocytosis with positive C diff PCR  - Enteric Isolation  - Vancomycin 125 mg QID, flagyl 500 mg IV    Sinusitis  Continuing cefepime and flagyl for sinusitis       HEMATOLOGY   Anemia: Normocytic Anemia. Likely due to initial dilution & chronic illness. Haptoglobin and firbrinogen normal with LDH elevated. Hemolysis unlikely. Patient has elevated reticulocyte count, but not quite appropriately elevated   - will recheck retic tomorrow to assess for ongoing poor production response  - Continue to trend hgb   - Tranfuse < 7      Thrombocytopenia: Unknown etiology.  - Monitor daily      INR: As high as 1.55 in the setting of overdose & liver injury.   - will recheck tomorrow      ENDOCRINE  Off insulin gtt  -now on medium SS correction for hyperglycemia      Hypothyroidism: Levothyroxine IV, switch back to oral once able      SKIN CARE  #Facial redness  Likely 2/2 being proned. Will continue to monitor      Prophylaxis: DVT: Lovenox: GI: PPI  Code status: Full  Lines: R PIV, L triple lumen PICC, R IJ CVC, patel, rectal tube, ETT      Patient seen and discussed with Dr. Rogers who agrees with the assessment and plan    Raymon Pedro MD  PGY2  [phone]      Interval Events    Patient  febrile overnight with liquid stools     Objective    Temperatures:  Current - Temp: 97.9  F (36.6  C); Max - Temp  Av.9  F (37.7  C)  Min: 97.4  F (36.3  C)  Max: 102.6  F (39.2  C)  Respiration range: Resp  Av.6  Min: 23  Max: 37  Pulse range: Heart Rate  Av.8  Min: 92  Max: 135  Blood pressure range: No data recorded.  ; No data recorded.    Pulse oximetry range: SpO2  Av.2 %  Min: 92 %  Max: 100 %    Ventilation Mode: CMV/AC  FiO2 (%): 35 %  Rate Set (breaths/minute): 22 breaths/min  Tidal Volume Set (mL): 460 mL  PEEP (cm H2O): 11 cmH2O  Oxygen Concentration (%): 35 %  Resp: 30        Physical Exam    ROUTINE ICU LABS (Last four results)  CMP  Recent Labs  Lab 17  1522 17  1041 17  0324 17  2250  17  0225  17  0343  17  0351   *  --  149*  --   --  144  --  143  < > 147*   POTASSIUM 3.7 3.8 3.3* 3.4  < > 3.0*  < > 3.3*  < > 4.0   CHLORIDE 109  --  102  --   --  90*  --  100  < > 109   CO2 29  --  39*  --   --  >45*  --  36*  < > 31   ANIONGAP 10  --  8  --   --  Not Calculated  --  7  < > 7   *  --  175*  --   --  139*  --  151*  < > 145*   BUN 72*  --  79*  --   --  45*  --  27  < > 21   CR 0.92  --  1.18*  --   --  1.28*  --  1.36*  < > 1.35*   GFRESTIMATED 66  --  49*  --   --  45*  --  42*  < > 42*   GFRESTBLACK 80  --  59*  --   --  54*  --  50*  < > 51*   ISAURO 8.4*  --  8.2*  --   --  7.5*  --  7.5*  < > 9.8   MAG 2.4* 2.5* 2.6* 2.6*  < > 2.5*  < > 2.2  < > 2.1   PHOS 3.0 1.7* 2.9 2.9  < > 3.1  < > 2.2*  < > 2.1*   PROTTOTAL  --   --  5.2*  --   --  5.5*  --  4.1*  --  4.3*   ALBUMIN  --   --  2.9*  --   --  3.6  --  1.9*  --  2.4*   BILITOTAL  --   --  6.0*  --   --  6.2*  --  3.3*  --  2.3*   ALKPHOS  --   --  591*  --   --  346*  --  224*  --  106   AST  --   --  241*  --   --  217*  --  121*  --  75*   ALT  --   --  92*  --   --  66*  --  35  --  28   < > = values in this interval not displayed.  CBC  Recent Labs  Lab  12/22/17  0324 12/21/17  0225 12/20/17  2304 12/20/17  1529 12/20/17  0343   WBC 30.6* 21.5* 26.0*  --  21.7*   RBC 2.54* 2.49* 2.93*  --  2.21*   HGB 7.9* 7.7* 8.8* 8.5* 6.9*   HCT 24.3* 23.1* 26.5*  --  20.9*   MCV 96 93 90  --  95   MCH 31.1 30.9 30.0  --  31.2   MCHC 32.5 33.3 33.2  --  33.0   RDW 18.1* 17.6* 18.0*  --  19.1*   PLT 98* 89* 101*  --  80*     INR  Recent Labs  Lab 12/22/17  0324 12/19/17  0545 12/18/17  0352 12/16/17  0505   INR 1.53* 1.73* 1.62* 1.29*     Arterial Blood Gas  Recent Labs  Lab 12/22/17  1135 12/21/17  2326 12/21/17  1135 12/21/17  0225   PH 7.46* 7.50* 7.54* 7.53*   PCO2 49* 54* 54* 56*   PO2 74* 72* 108* 92   HCO3 34* 42* 46* 46*   O2PER 35.0 35.0 40 40.0       Physical exam:  General: Patient lying in bed intubated and sedated   HEENT: no scleral icterus or injection, no bruits appreciated, no JVD, MMM  Cardiac: RRR, no m/r/g appreciated.   Respiratory: CTAB, no wheezes, rhonchi or crackles appreciated.  GI: NABS, NT/ND, no guarding or rebound  Extremities: No LE edema, pulses DP 2+, radial pulses 2+   Skin: No acute lesions appreciated  Neuro: sedated     Imaging   CXR   Impression:   1. Stable support devices associated above.  2. Slightly increased hazy and interstitial opacities over the right  lower lobe. This could represent slightly worsening pulmonary edema,  infection and or ARDS.    Abdominal X Ray  IMPRESSION: Nonobstructive bowel gas pattern. NG/OG tube with its tip  and sidehole projecting over the stomach.

## 2017-12-23 NOTE — PLAN OF CARE
Problem: Patient Care Overview  Goal: Plan of Care/Patient Progress Review  Outcome: No Change  D:  Pt with h/o suicide attempt via Ca channel blocker, Tylenol, and ETOH overdose remains on mechanical ventilation, sedated and febrile.  I/A: Restless this entire shift moving BUE and opening eyes occasionally ,but not to command.  Propofol drip increased from 20 to 40 mcg/kg /min 2/2 restlessness, Versed remains at 6 mg/hr and fentanyl 150 mcg/hr.  Multiple prns bumps given for agitation/restlessness without any improvement.  Febrile 102.6 tympanic, HTN 's-160's systolic and respiratory rate in the mid 30's.   MD notified, antibiotic regimen changed, prn hydralazine 5 mg ordered for SBP >180; one dose of prn hydralazine given without any changes in blood pressure.  Ice packs placed underneath all extremities and behind neck; temp low 97.4 tympanic.  C diff sent and is positive, pt started on Vanco PO and Vanco enama/.  P:  Continue to monitor, assess and notify the team if hypertension continues or any changes in pt's status.

## 2017-12-23 NOTE — PLAN OF CARE
Problem: Patient Care Overview  Goal: Plan of Care/Patient Progress Review  Outcome: No Change  D:  Pt with h/o suicide attempt via Ca channel blocker, Tylenol, and ETOH overdose remains on mechanical ventilation, sedated, and hypertensive  I/A: Restless this entire shift moving BUE not following commands or withdrawing to pain on BUE, slight muscle contraction with pain to BLE. Versed remains at 6 mg/hr and fentanyl 150 mcg/hr. Pt remained afebrile NOC, HTN 's-160's systolic and respiratory rate in the mid 30's. prn hydralazine 5 mg given x1 for SBP >180. Minimal stool output from rectal tube.  P:  Continue to monitor, assess and notify the team to any changes in pt's status.

## 2017-12-23 NOTE — PROGRESS NOTES
Medical ICU Progress Note    Patient:  Ana Laura Wharton   Date of birth 1970, Medical record number [id number]  Date of Visit:  12/23/2017  Date of Admission: 12/13/2017    Concise Assessment and Plan  # Suicide Attempt by acetaminophen, ETOH, and amlodipine  - Continue to monitor       # ARDS 2/2 aspiration  - Continue ARDSnet protocol  - discontinue paralysis; wean versed  - Discontinued Flolan       # Sepsis 2/2 C Diff  - IV metronidazole and PO vanc      NEURO  Sedation/Analgesia:  Midazolam gtt; will slow wean today and hold on others to not remove too many at once  Fentanyl gtt   Propofol gtt       Paralytic: d/c'ed paralytic 12/21      Bipolar I disorder with anxiety & depression  Suicide attempt with polysubstance abuse (amlodipine, nyquil (acetaminophen), alcohol (vodka))  Completed poison control recommendations of calcium drip with goal of ical 10. Further, completed high dose insulin drip and will trend BPs.   - Manage OD as per below  - Will monitor for withdrawal symptoms once weaned from sedation (ETOH 0.17 at OSH). IV thiamine & folate  - Restarted home Zyprexa, will restart Remeron soon  - Psych consult for OD when awake      Agitation: Patient at high risk of alcohol & benzo withdrawal  - Slow wean of sedation when medically appropriate      RESPIRATORY  Ventilation Mode: CMV/AC  FiO2 (%): 35 %  Rate Set (breaths/minute): 22 breaths/min  Tidal Volume Set (mL): 460 mL  PEEP (cm H2O): 11 cmH2O  Oxygen Concentration (%): 35 %  Resp: 28    ARDS 2/2 aspiration   Acute Hypoxic  Respiratory Failure:  Acute Hypercarbic Respiratory Failure  Due initially to hypoventilation due to overdose, now due to V/Q mismatch in the setting of ARDS. ARDS net vent settings for lung protection   - D/C'ed Flolan 12/22  - Vent settings as per above   - daily ABG  - Solumedrol in the setting of recurrent hypercarbia and concern for air trapping in the setting of possible COPD.   - Duonebs for air  trapping      CARDIOVASCULAR  HTN  Patient is hypertensive at baseline with home antihypertensives   - Hydralazine 5 mg IV q6 for SBP > 180     Hypotension/Vasodilatory shock/Vasoplegia, Resolved:   Required pressors. Side effect of CCB overdose. ECHO shows normal EF of 70%. Normal RV function. She has been titrated off norepi and has been hemodynamically stable. Further, she has been titrated off her insulin and pressures have been stable.   - Continue stress dose steroids until post ARDS      Paroxysmal atrial fibrillation: Briefly in Afib with RVR this 12/20. Received metoprolol x 2. Converted to NSR. Will hopefully improve once fluid status improves  - No acute need for management at this time       Intermittent SVT  Patient has had SVT with rates in the 190s intermittently and spontaneously resolves. Hemodynamically stable during episodes of SVT  - Patient has been stable without receiving further medications      RENAL / FLUIDS / ELECTROLYTES  Baseline creatinine: 0.7-0.8.       Acute Renal Failure 2/2 calcium channel blocker overdose:   As high as 1.6.  Due to CCB overdose. Volume overload, pulmonary edema. Cr slowly improving with diuresis & forward flow  - Lasix gtt held today  - Metolazone 10 mg held today   - I/Os, Monitor Cr, UOP  - Monitoring lytes, replace  - will slow diuresis goal today given shifting likely contributed to lactic acidosis      Lactic Acidosis/AGMA, resolved: Due to hypoperfusion. Lactate may continue to be elevated until after CCB has cleared. Likely acute rise last night was 2/2 volume shifting given massive diuresis over last 2 days coupled with OD.   - Continue to trend  - Goal of pH>7.25, bicarb gtt as necessary      Metabolic alkalosis, resolved  Likely 2/2 volume contraction in setting of aggressive diuresis while on ventilator. Will plan to switch lasix gtt to diamox for today and reassess tomorrow  -hold lasix, metolazone      GASTROINTESTINAL    Acetaminophen  overdose  Elevated Transaminases  Found next to bottle of Nyquil. AST/ALTs now normal s/p NAC gtt.   - Trend LFTs, INR      OG: OG to LIS    C diff Colitis  See below      INFECTIOUS DISEASE     Sepsis 2/2 C diff  Patient has new fever and rising leukocytosis with positive C diff PCR  - Enteric Isolation  - Vancomycin 125 mg QID, flagyl 500 mg IV     Sinusitis  Completed course of abx with levofloxacin and cefepime       HEMATOLOGY   Anemia: Normocytic Anemia. Likely due to initial dilution & chronic illness. Haptoglobin and firbrinogen normal with LDH elevated. Hemolysis unlikely. Patient has elevated reticulocyte count, but not quite appropriately elevated   - will recheck retic tomorrow to assess for ongoing poor production response  - Continue to trend hgb   - Tranfuse < 7      Thrombocytopenia: Unknown etiology.  - Monitor daily      INR: As high as 1.55 in the setting of overdose & liver injury.   - will recheck tomorrow      ENDOCRINE  Off insulin gtt  -now on medium SS correction for hyperglycemia      Hypothyroidism: Levothyroxine 50 PO qday      SKIN CARE  #Facial redness  Likely 2/2 being proned. Will continue to monitor      Prophylaxis: DVT: Lovenox: GI: PPI  Code status: Full  Lines: R PIV, L triple lumen PICC, R IJ CVC, patel, rectal tube, ETT      Patient seen and discussed with Dr. Rogers who agrees with the assessment and plan  Raymon Pedro MD  PGY2  [phone]      Interval Events    Patient became hypertensive. Today she has become more awake and more mobile.     Objective    Temperatures:  Current - Temp: 98.7  F (37.1  C); Max - Temp  Av  F (37.2  C)  Min: 97.2  F (36.2  C)  Max: 102.6  F (39.2  C)  Respiration range: Resp  Av.4  Min: 25  Max: 37  Pulse range: Heart Rate  Av  Min: 82  Max: 135  Blood pressure range: No data recorded.  ; No data recorded.    Pulse oximetry range: SpO2  Av.9 %  Min: 90 %  Max: 99 %    Ventilation Mode: CMV/AC  FiO2 (%): 35 %  Rate Set  (breaths/minute): 22 breaths/min  Tidal Volume Set (mL): 460 mL  PEEP (cm H2O): 11 cmH2O  Oxygen Concentration (%): 35 %  Resp: 28        Physical Exam    ROUTINE ICU LABS (Last four results)  CMP  Recent Labs  Lab 12/23/17  0359 12/22/17 2004 12/22/17  1522 12/22/17  1041 12/22/17  0324  12/21/17  0225  12/20/17  0343   * 150* 149*  --  149*  --  144  --  143   POTASSIUM 4.0 3.8 3.7 3.8 3.3*  < > 3.0*  < > 3.3*   CHLORIDE 111* 111* 109  --  102  --  90*  --  100   CO2 28 32 29  --  39*  --  >45*  --  36*   ANIONGAP 9 7 10  --  8  --  Not Calculated  --  7   * 223* 216*  --  175*  --  139*  --  151*   BUN 66* 73* 72*  --  79*  --  45*  --  27   CR 0.67 0.81 0.92  --  1.18*  --  1.28*  --  1.36*   GFRESTIMATED >90 75 66  --  49*  --  45*  --  42*   GFRESTBLACK >90 >90 80  --  59*  --  54*  --  50*   ISAURO 8.2* 8.0* 8.4*  --  8.2*  --  7.5*  --  7.5*   MAG 2.4* 2.4* 2.4* 2.5* 2.6*  < > 2.5*  < > 2.2   PHOS 1.9* 2.7 3.0 1.7* 2.9  < > 3.1  < > 2.2*   PROTTOTAL 5.5*  --   --   --  5.2*  --  5.5*  --  4.1*   ALBUMIN 2.8*  --   --   --  2.9*  --  3.6  --  1.9*   BILITOTAL 8.4*  --   --   --  6.0*  --  6.2*  --  3.3*   ALKPHOS 814*  --   --   --  591*  --  346*  --  224*   *  --   --   --  241*  --  217*  --  121*   *  --   --   --  92*  --  66*  --  35   < > = values in this interval not displayed.  CBC  Recent Labs  Lab 12/23/17  0359 12/22/17 0324 12/21/17 0225 12/20/17  2304   WBC 29.5* 30.6* 21.5* 26.0*   RBC 2.93* 2.54* 2.49* 2.93*   HGB 8.9* 7.9* 7.7* 8.8*   HCT 27.7* 24.3* 23.1* 26.5*   MCV 95 96 93 90   MCH 30.4 31.1 30.9 30.0   MCHC 32.1 32.5 33.3 33.2   RDW 19.0* 18.1* 17.6* 18.0*   * 98* 89* 101*     INR  Recent Labs  Lab 12/22/17 0324 12/19/17  0545 12/18/17 0352   INR 1.53* 1.73* 1.62*     Arterial Blood Gas  Recent Labs  Lab 12/22/17  1135 12/21/17  2326 12/21/17  1135 12/21/17 0225   PH 7.46* 7.50* 7.54* 7.53*   PCO2 49* 54* 54* 56*   PO2 74* 72* 108* 92   HCO3 34* 42*  46* 46*   O2PER 35.0 35.0 40 40.0       Physical exam:  General: Patient lying comfortably in bed, intubated and sedated, moving volitionally   HEENT: Mild scleral injection noted, no bruits appreciated, no JVD, MMM  Cardiac: RRR, no m/r/g appreciated.   Respiratory: CTAB, no wheezes, rhonchi or crackles appreciated.  GI: NABS, NT/ND, no guarding or rebound  Extremities: No LE edema, pulses DP 2+, radial pulses 2+   Skin: No acute lesions appreciated  Neuro: Sedated, but moving volitionally all extremities     Imaging   CXR     No acute changes compared with previous CXR

## 2017-12-24 ENCOUNTER — APPOINTMENT (OUTPATIENT)
Dept: GENERAL RADIOLOGY | Facility: CLINIC | Age: 47
DRG: 917 | End: 2017-12-24
Attending: SURGERY
Payer: COMMERCIAL

## 2017-12-24 LAB
ALBUMIN SERPL-MCNC: 2.2 G/DL (ref 3.4–5)
ALP SERPL-CCNC: 771 U/L (ref 40–150)
ALT SERPL W P-5'-P-CCNC: 131 U/L (ref 0–50)
ANION GAP SERPL CALCULATED.3IONS-SCNC: 6 MMOL/L (ref 3–14)
ANION GAP SERPL CALCULATED.3IONS-SCNC: 7 MMOL/L (ref 3–14)
AST SERPL W P-5'-P-CCNC: 184 U/L (ref 0–45)
BASE EXCESS BLDA CALC-SCNC: 4.6 MMOL/L
BASOPHILS # BLD AUTO: 0 10E9/L (ref 0–0.2)
BASOPHILS NFR BLD AUTO: 0.1 %
BILIRUB SERPL-MCNC: 5.9 MG/DL (ref 0.2–1.3)
BUN SERPL-MCNC: 62 MG/DL (ref 7–30)
BUN SERPL-MCNC: 65 MG/DL (ref 7–30)
CALCIUM SERPL-MCNC: 7.8 MG/DL (ref 8.5–10.1)
CALCIUM SERPL-MCNC: 7.8 MG/DL (ref 8.5–10.1)
CHLORIDE SERPL-SCNC: 112 MMOL/L (ref 94–109)
CHLORIDE SERPL-SCNC: 113 MMOL/L (ref 94–109)
CO2 SERPL-SCNC: 27 MMOL/L (ref 20–32)
CO2 SERPL-SCNC: 31 MMOL/L (ref 20–32)
CREAT SERPL-MCNC: 0.6 MG/DL (ref 0.52–1.04)
CREAT SERPL-MCNC: 0.65 MG/DL (ref 0.52–1.04)
DIFFERENTIAL METHOD BLD: ABNORMAL
EOSINOPHIL # BLD AUTO: 0 10E9/L (ref 0–0.7)
EOSINOPHIL NFR BLD AUTO: 0.1 %
ERYTHROCYTE [DISTWIDTH] IN BLOOD BY AUTOMATED COUNT: 19.5 % (ref 10–15)
GFR SERPL CREATININE-BSD FRML MDRD: >90 ML/MIN/1.7M2
GFR SERPL CREATININE-BSD FRML MDRD: >90 ML/MIN/1.7M2
GLUCOSE BLDC GLUCOMTR-MCNC: 113 MG/DL (ref 70–99)
GLUCOSE BLDC GLUCOMTR-MCNC: 205 MG/DL (ref 70–99)
GLUCOSE BLDC GLUCOMTR-MCNC: 206 MG/DL (ref 70–99)
GLUCOSE BLDC GLUCOMTR-MCNC: 236 MG/DL (ref 70–99)
GLUCOSE BLDC GLUCOMTR-MCNC: 86 MG/DL (ref 70–99)
GLUCOSE SERPL-MCNC: 192 MG/DL (ref 70–99)
GLUCOSE SERPL-MCNC: 76 MG/DL (ref 70–99)
HCO3 BLD-SCNC: 28 MMOL/L (ref 21–28)
HCT VFR BLD AUTO: 24.9 % (ref 35–47)
HGB BLD-MCNC: 8.1 G/DL (ref 11.7–15.7)
IMM GRANULOCYTES # BLD: 1 10E9/L (ref 0–0.4)
IMM GRANULOCYTES NFR BLD: 4.3 %
LACTATE BLD-SCNC: 1 MMOL/L (ref 0.7–2)
LACTATE BLD-SCNC: 1.5 MMOL/L (ref 0.7–2)
LACTATE BLD-SCNC: 1.7 MMOL/L (ref 0.7–2)
LYMPHOCYTES # BLD AUTO: 0.8 10E9/L (ref 0.8–5.3)
LYMPHOCYTES NFR BLD AUTO: 3.5 %
MAGNESIUM SERPL-MCNC: 2.3 MG/DL (ref 1.6–2.3)
MCH RBC QN AUTO: 30.9 PG (ref 26.5–33)
MCHC RBC AUTO-ENTMCNC: 32.5 G/DL (ref 31.5–36.5)
MCV RBC AUTO: 95 FL (ref 78–100)
MONOCYTES # BLD AUTO: 1 10E9/L (ref 0–1.3)
MONOCYTES NFR BLD AUTO: 4.4 %
NEUTROPHILS # BLD AUTO: 19.9 10E9/L (ref 1.6–8.3)
NEUTROPHILS NFR BLD AUTO: 87.6 %
NRBC # BLD AUTO: 0.1 10*3/UL
NRBC BLD AUTO-RTO: 1 /100
O2/TOTAL GAS SETTING VFR VENT: 35 %
PCO2 BLD: 38 MM HG (ref 35–45)
PH BLD: 7.48 PH (ref 7.35–7.45)
PHOSPHATE SERPL-MCNC: 2.8 MG/DL (ref 2.5–4.5)
PLATELET # BLD AUTO: 158 10E9/L (ref 150–450)
PO2 BLD: 77 MM HG (ref 80–105)
POTASSIUM SERPL-SCNC: 3.4 MMOL/L (ref 3.4–5.3)
POTASSIUM SERPL-SCNC: 4.3 MMOL/L (ref 3.4–5.3)
PROT SERPL-MCNC: 4.8 G/DL (ref 6.8–8.8)
RBC # BLD AUTO: 2.62 10E12/L (ref 3.8–5.2)
SODIUM SERPL-SCNC: 147 MMOL/L (ref 133–144)
SODIUM SERPL-SCNC: 149 MMOL/L (ref 133–144)
WBC # BLD AUTO: 22.7 10E9/L (ref 4–11)

## 2017-12-24 PROCEDURE — 94640 AIRWAY INHALATION TREATMENT: CPT | Mod: 76

## 2017-12-24 PROCEDURE — 25000128 H RX IP 250 OP 636: Performed by: STUDENT IN AN ORGANIZED HEALTH CARE EDUCATION/TRAINING PROGRAM

## 2017-12-24 PROCEDURE — 25000125 ZZHC RX 250: Performed by: INTERNAL MEDICINE

## 2017-12-24 PROCEDURE — 80048 BASIC METABOLIC PNL TOTAL CA: CPT | Performed by: STUDENT IN AN ORGANIZED HEALTH CARE EDUCATION/TRAINING PROGRAM

## 2017-12-24 PROCEDURE — 00000146 ZZHCL STATISTIC GLUCOSE BY METER IP

## 2017-12-24 PROCEDURE — 27210913 ZZH NUTRITION PRODUCT INTERMEDIATE PACKET

## 2017-12-24 PROCEDURE — 25000128 H RX IP 250 OP 636

## 2017-12-24 PROCEDURE — 94003 VENT MGMT INPAT SUBQ DAY: CPT

## 2017-12-24 PROCEDURE — 80053 COMPREHEN METABOLIC PANEL: CPT | Performed by: STUDENT IN AN ORGANIZED HEALTH CARE EDUCATION/TRAINING PROGRAM

## 2017-12-24 PROCEDURE — 25000132 ZZH RX MED GY IP 250 OP 250 PS 637: Performed by: INTERNAL MEDICINE

## 2017-12-24 PROCEDURE — 99233 SBSQ HOSP IP/OBS HIGH 50: CPT

## 2017-12-24 PROCEDURE — 82803 BLOOD GASES ANY COMBINATION: CPT | Performed by: INTERNAL MEDICINE

## 2017-12-24 PROCEDURE — 25000128 H RX IP 250 OP 636: Performed by: INTERNAL MEDICINE

## 2017-12-24 PROCEDURE — 20000004 ZZH R&B ICU UMMC

## 2017-12-24 PROCEDURE — 83605 ASSAY OF LACTIC ACID: CPT | Performed by: INTERNAL MEDICINE

## 2017-12-24 PROCEDURE — 84100 ASSAY OF PHOSPHORUS: CPT | Performed by: STUDENT IN AN ORGANIZED HEALTH CARE EDUCATION/TRAINING PROGRAM

## 2017-12-24 PROCEDURE — 99291 CRITICAL CARE FIRST HOUR: CPT | Mod: GC | Performed by: INTERNAL MEDICINE

## 2017-12-24 PROCEDURE — 83735 ASSAY OF MAGNESIUM: CPT | Performed by: STUDENT IN AN ORGANIZED HEALTH CARE EDUCATION/TRAINING PROGRAM

## 2017-12-24 PROCEDURE — 71010 XR CHEST PORT 1 VW: CPT

## 2017-12-24 PROCEDURE — 85025 COMPLETE CBC W/AUTO DIFF WBC: CPT | Performed by: STUDENT IN AN ORGANIZED HEALTH CARE EDUCATION/TRAINING PROGRAM

## 2017-12-24 PROCEDURE — 40000275 ZZH STATISTIC RCP TIME EA 10 MIN

## 2017-12-24 PROCEDURE — 94640 AIRWAY INHALATION TREATMENT: CPT

## 2017-12-24 PROCEDURE — 25000132 ZZH RX MED GY IP 250 OP 250 PS 637: Performed by: STUDENT IN AN ORGANIZED HEALTH CARE EDUCATION/TRAINING PROGRAM

## 2017-12-24 PROCEDURE — A7035 POS AIRWAY PRESS HEADGEAR: HCPCS

## 2017-12-24 PROCEDURE — 25000125 ZZHC RX 250: Performed by: STUDENT IN AN ORGANIZED HEALTH CARE EDUCATION/TRAINING PROGRAM

## 2017-12-24 RX ORDER — QUETIAPINE FUMARATE 50 MG/1
50 TABLET, FILM COATED ORAL AT BEDTIME
Status: DISCONTINUED | OUTPATIENT
Start: 2017-12-24 | End: 2017-12-24

## 2017-12-24 RX ORDER — METHYLPREDNISOLONE SODIUM SUCCINATE 40 MG/ML
40 INJECTION, POWDER, LYOPHILIZED, FOR SOLUTION INTRAMUSCULAR; INTRAVENOUS EVERY 12 HOURS
Status: DISCONTINUED | OUTPATIENT
Start: 2017-12-24 | End: 2017-12-25

## 2017-12-24 RX ORDER — FUROSEMIDE 10 MG/ML
80 INJECTION INTRAMUSCULAR; INTRAVENOUS ONCE
Status: COMPLETED | OUTPATIENT
Start: 2017-12-24 | End: 2017-12-24

## 2017-12-24 RX ORDER — LORAZEPAM 2 MG/ML
INJECTION INTRAMUSCULAR
Status: COMPLETED
Start: 2017-12-24 | End: 2017-12-24

## 2017-12-24 RX ORDER — HALOPERIDOL 5 MG/ML
2 INJECTION INTRAMUSCULAR EVERY 12 HOURS PRN
Status: DISCONTINUED | OUTPATIENT
Start: 2017-12-24 | End: 2017-12-25

## 2017-12-24 RX ORDER — IPRATROPIUM BROMIDE AND ALBUTEROL SULFATE 2.5; .5 MG/3ML; MG/3ML
3 SOLUTION RESPIRATORY (INHALATION)
Status: DISCONTINUED | OUTPATIENT
Start: 2017-12-24 | End: 2017-12-28

## 2017-12-24 RX ORDER — MIRTAZAPINE 15 MG/1
15 TABLET, FILM COATED ORAL AT BEDTIME
Status: DISCONTINUED | OUTPATIENT
Start: 2017-12-24 | End: 2018-01-05 | Stop reason: HOSPADM

## 2017-12-24 RX ORDER — LORAZEPAM 2 MG/ML
.5-4 INJECTION INTRAMUSCULAR SEE ADMIN INSTRUCTIONS
Status: DISCONTINUED | OUTPATIENT
Start: 2017-12-24 | End: 2017-12-25

## 2017-12-24 RX ORDER — LORAZEPAM 2 MG/ML
.5-1 INJECTION INTRAMUSCULAR EVERY 4 HOURS PRN
Status: DISCONTINUED | OUTPATIENT
Start: 2017-12-24 | End: 2017-12-25

## 2017-12-24 RX ORDER — LORAZEPAM 0.5 MG/1
.5-4 TABLET ORAL SEE ADMIN INSTRUCTIONS
Status: DISCONTINUED | OUTPATIENT
Start: 2017-12-24 | End: 2017-12-25

## 2017-12-24 RX ORDER — OLANZAPINE 5 MG/1
5 TABLET, ORALLY DISINTEGRATING ORAL 2 TIMES DAILY
Status: DISCONTINUED | OUTPATIENT
Start: 2017-12-24 | End: 2018-01-05 | Stop reason: HOSPADM

## 2017-12-24 RX ORDER — FUROSEMIDE 10 MG/ML
60 INJECTION INTRAMUSCULAR; INTRAVENOUS ONCE
Status: COMPLETED | OUTPATIENT
Start: 2017-12-24 | End: 2017-12-24

## 2017-12-24 RX ADMIN — Medication 3 MG: at 08:19

## 2017-12-24 RX ADMIN — METRONIDAZOLE 500 MG: 500 INJECTION, SOLUTION INTRAVENOUS at 19:02

## 2017-12-24 RX ADMIN — SODIUM CHLORIDE, PRESERVATIVE FREE 5 ML: 5 INJECTION INTRAVENOUS at 17:05

## 2017-12-24 RX ADMIN — OLANZAPINE 5 MG: 5 TABLET, ORALLY DISINTEGRATING ORAL at 08:19

## 2017-12-24 RX ADMIN — FENTANYL CITRATE 50 MCG: 50 INJECTION, SOLUTION INTRAMUSCULAR; INTRAVENOUS at 09:42

## 2017-12-24 RX ADMIN — Medication 125 MG: at 12:54

## 2017-12-24 RX ADMIN — INSULIN ASPART 2 UNITS: 100 INJECTION, SOLUTION INTRAVENOUS; SUBCUTANEOUS at 04:35

## 2017-12-24 RX ADMIN — METRONIDAZOLE 500 MG: 500 INJECTION, SOLUTION INTRAVENOUS at 02:03

## 2017-12-24 RX ADMIN — METHYLPREDNISOLONE 40 MG: 40 INJECTION, POWDER, LYOPHILIZED, FOR SOLUTION INTRAMUSCULAR; INTRAVENOUS at 19:03

## 2017-12-24 RX ADMIN — MAGNESIUM SULFATE IN DEXTROSE 1 G: 10 INJECTION, SOLUTION INTRAVENOUS at 06:33

## 2017-12-24 RX ADMIN — MIRTAZAPINE 15 MG: 15 TABLET, FILM COATED ORAL at 22:07

## 2017-12-24 RX ADMIN — MULTIVITAMIN 15 ML: LIQUID ORAL at 08:19

## 2017-12-24 RX ADMIN — Medication 125 MG: at 21:04

## 2017-12-24 RX ADMIN — MIDAZOLAM 2 MG: 1 INJECTION INTRAMUSCULAR; INTRAVENOUS at 02:36

## 2017-12-24 RX ADMIN — LORAZEPAM 1 MG: 2 INJECTION INTRAMUSCULAR; INTRAVENOUS at 18:06

## 2017-12-24 RX ADMIN — THIAMINE HYDROCHLORIDE 100 MG: 100 INJECTION, SOLUTION INTRAMUSCULAR; INTRAVENOUS at 20:55

## 2017-12-24 RX ADMIN — MIDAZOLAM 4 MG: 1 INJECTION INTRAMUSCULAR; INTRAVENOUS at 00:18

## 2017-12-24 RX ADMIN — CARVEDILOL 6.25 MG: 25 TABLET, FILM COATED ORAL at 21:04

## 2017-12-24 RX ADMIN — CARVEDILOL 6.25 MG: 25 TABLET, FILM COATED ORAL at 08:19

## 2017-12-24 RX ADMIN — FUROSEMIDE 60 MG: 10 INJECTION, SOLUTION INTRAVENOUS at 17:07

## 2017-12-24 RX ADMIN — Medication 1 PACKET: at 08:31

## 2017-12-24 RX ADMIN — ENOXAPARIN SODIUM 40 MG: 40 INJECTION SUBCUTANEOUS at 10:11

## 2017-12-24 RX ADMIN — DEXMEDETOMIDINE HYDROCHLORIDE 1 MCG/KG/HR: 4 INJECTION, SOLUTION INTRAVENOUS at 07:35

## 2017-12-24 RX ADMIN — THIAMINE HYDROCHLORIDE 100 MG: 100 INJECTION, SOLUTION INTRAMUSCULAR; INTRAVENOUS at 08:26

## 2017-12-24 RX ADMIN — DEXMEDETOMIDINE HYDROCHLORIDE 0.7 MCG/KG/HR: 4 INJECTION, SOLUTION INTRAVENOUS at 21:50

## 2017-12-24 RX ADMIN — LORAZEPAM 1 MG: 2 INJECTION INTRAMUSCULAR; INTRAVENOUS at 23:59

## 2017-12-24 RX ADMIN — OLANZAPINE 5 MG: 5 TABLET, ORALLY DISINTEGRATING ORAL at 22:07

## 2017-12-24 RX ADMIN — METRONIDAZOLE 500 MG: 500 INJECTION, SOLUTION INTRAVENOUS at 10:12

## 2017-12-24 RX ADMIN — Medication 125 MG: at 17:00

## 2017-12-24 RX ADMIN — Medication 125 MG: at 08:19

## 2017-12-24 RX ADMIN — IPRATROPIUM BROMIDE AND ALBUTEROL SULFATE 3 ML: .5; 3 SOLUTION RESPIRATORY (INHALATION) at 16:32

## 2017-12-24 RX ADMIN — Medication 100 MCG/HR: at 04:24

## 2017-12-24 RX ADMIN — Medication 1 PACKET: at 12:52

## 2017-12-24 RX ADMIN — FOLIC ACID 1 MG: 5 INJECTION, SOLUTION INTRAMUSCULAR; INTRAVENOUS; SUBCUTANEOUS at 08:40

## 2017-12-24 RX ADMIN — HALOPERIDOL LACTATE 2 MG: 5 INJECTION, SOLUTION INTRAMUSCULAR at 23:35

## 2017-12-24 RX ADMIN — Medication 3 MG/HR: at 08:48

## 2017-12-24 RX ADMIN — PANTOPRAZOLE SODIUM 40 MG: 40 INJECTION, POWDER, FOR SOLUTION INTRAVENOUS at 08:22

## 2017-12-24 RX ADMIN — METHYLPREDNISOLONE 40 MG: 40 INJECTION, POWDER, LYOPHILIZED, FOR SOLUTION INTRAMUSCULAR; INTRAVENOUS at 06:34

## 2017-12-24 RX ADMIN — FUROSEMIDE 80 MG: 10 INJECTION, SOLUTION INTRAVENOUS at 10:11

## 2017-12-24 RX ADMIN — MIDAZOLAM 4 MG: 1 INJECTION INTRAMUSCULAR; INTRAVENOUS at 01:09

## 2017-12-24 RX ADMIN — IPRATROPIUM BROMIDE AND ALBUTEROL SULFATE 3 ML: .5; 3 SOLUTION RESPIRATORY (INHALATION) at 19:49

## 2017-12-24 RX ADMIN — FENTANYL CITRATE 50 MCG: 50 INJECTION, SOLUTION INTRAMUSCULAR; INTRAVENOUS at 08:13

## 2017-12-24 RX ADMIN — POTASSIUM CHLORIDE 20 MEQ: 400 INJECTION, SOLUTION INTRAVENOUS at 20:56

## 2017-12-24 RX ADMIN — LEVOTHYROXINE SODIUM 50 MCG: 25 TABLET ORAL at 08:19

## 2017-12-24 NOTE — PROVIDER NOTIFICATION
Patient suctioned and electively extubated per physician order. Placed on HFNC, 40 LPM 35%, labs pending. Patient tolerated procedure well without any immediate complications.    Onel Morse, RT  12/24/2017 1:15 PM

## 2017-12-24 NOTE — PLAN OF CARE
Problem: Patient Care Overview  Goal: Plan of Care/Patient Progress Review  D/I/A: Tolerating PST 7/8, 35% since 0600. Afebrile, VSS. Arterial line removed per orders. Mg replaced per protocol. UOP  cc/h. 100 cc stool output overnight. Precedex, versed, and fentanyl gtts continued. Bumps of versed and fentanyl given for pain/restlessness. P: Will continue to monitor.

## 2017-12-24 NOTE — PLAN OF CARE
Problem: Patient Care Overview  Goal: Plan of Care/Patient Progress Review  Outcome: Therapy, progress toward functional goals is gradual  D: Respiratory status  I/A: PEEP decrease from 11 to 8, tidal volume increased from 460 to 480 and rate decreased 22 to 18; ABG obtained hours later 7.49/37/69/28 on 35% FIO2; SPO2 remains >93%, with clear to coarse lung sounds.  P:  Continue to monitor respiratory status closely, notify the team of any changes in respiratory status.

## 2017-12-24 NOTE — PROGRESS NOTES
Medical ICU Progress Note    Patient:  Ana Laura Wharton   Date of birth 1970, Medical record number [id number]  Date of Visit:  12/24/2017  Date of Admission: 12/13/2017    Concise Assessment and Plan  # Suicide Attempt by acetaminophen, ETOH, and amlodipine  - Continue to monitor   - Psych consult       # ARDS 2/2 aspiration  - Extubated today      # Sepsis 2/2 C Diff  - IV metronidazole and PO vanc      NEURO  Sedation/Analgesia:  Midazolam gtt; will slow wean today and hold on others to not remove too many at once  Fentanyl gtt   Propofol gtt       Paralytic: d/c'ed paralytic 12/21      Bipolar I disorder with anxiety & depression  Suicide attempt with polysubstance abuse (amlodipine, nyquil (acetaminophen), alcohol (vodka))  Completed poison control recommendations of calcium drip with goal of ical 10. Further, completed high dose insulin drip and will trend BPs.   - Manage OD as per below  - Will monitor for withdrawal symptoms once weaned from sedation (ETOH 0.17 at OSH).  - Restarted home Zyprexa, restart Remeron today  - Psych consult today      Agitation:   Will schedule Zyprexa at home dose of 5 mg BID      RESPIRATORY   ARDS 2/2 aspiration, resolved  Acute Hypoxic  Respiratory Failure, resolved  Acute Hypercarbic Respiratory Failure, resolved  Due initially to hypoventilation due to overdose, now due to V/Q mismatch in the setting of ARDS. ARDS net vent settings for lung protection. D/C'ed Flolan on 12/22. Extubated 12/24  - Solumedrol in the setting of recurrent hypercarbia and concern for air trapping in the setting of possible COPD.   - Duonebs for air trapping      CARDIOVASCULAR  HTN  Patient is hypertensive at baseline with home antihypertensives   - Hydralazine 5 mg IV q6 for SBP > 180   - Carvedilol 6.25 mg BID     Hypotension/Vasodilatory shock/Vasoplegia, Resolved:   Required pressors. Side effect of CCB overdose. ECHO shows normal EF of 70%. Normal RV function. She has been titrated off  norepi and has been hemodynamically stable. Further, she has been titrated off her insulin and pressures have been stable.   - Continue stress dose steroids until post ARDS      Paroxysmal atrial fibrillation: Briefly in Afib with RVR this 12/20. Received metoprolol x 2. Converted to NSR. Will hopefully improve once fluid status improves  - No acute need for management at this time       Intermittent SVT  Patient has had SVT with rates in the 190s intermittently and spontaneously resolves. Hemodynamically stable during episodes of SVT  - Patient has been stable without receiving further medications      RENAL / FLUIDS / ELECTROLYTES  Baseline creatinine: 0.7-0.8.       Acute Renal Failure 2/2 calcium channel blocker overdose:   As high as 1.6.  Due to CCB overdose. Volume overload, pulmonary edema. Cr slowly improving with diuresis & forward flow  - Lasix 80 mg IV once this am and 60 mg IV once this pm  - I/Os, Monitor Cr, UOP      Lactic Acidosis/AGMA, resolved: Due to hypoperfusion. Lactate may continue to be elevated until after CCB has cleared. Likely acute rise last night was 2/2 volume shifting given massive diuresis over last 2 days coupled with OD.   - Continue to trend      Metabolic alkalosis, resolved  Likely 2/2 volume contraction in setting of aggressive diuresis while on ventilator. Will plan to switch lasix gtt to diamox for today and reassess tomorrow  -hold lasix, metolazone      GASTROINTESTINAL    Acetaminophen overdose  Elevated Transaminases  Found next to bottle of Nyquil. AST/ALTs now normal s/p NAC gtt.   - Trend LFTs, INR      OG: OG to LIS     C diff Colitis  See below       INFECTIOUS DISEASE     Sepsis 2/2 C diff  Patient has new fever and rising leukocytosis with positive C diff PCR  - Enteric Isolation  - Vancomycin 125 mg QID, flagyl 500 mg IV      Sinusitis  Completed course of abx with levofloxacin and cefepime       HEMATOLOGY   Anemia: Normocytic Anemia. Likely due to initial  dilution & chronic illness. Haptoglobin and firbrinogen normal with LDH elevated. Hemolysis unlikely. Patient has elevated reticulocyte count, but not quite appropriately elevated   - will recheck retic tomorrow to assess for ongoing poor production response  - Continue to trend hgb   - Tranfuse < 7      Thrombocytopenia: Unknown etiology.  - Monitor daily      INR: As high as 1.55 in the setting of overdose & liver injury.   - will recheck tomorrow      ENDOCRINE  Off insulin gtt  -now on medium SS correction for hyperglycemia      Hypothyroidism: Levothyroxine 50 PO qday      SKIN CARE  #Facial redness  Likely 2/2 being proned. Will continue to monitor      Prophylaxis: DVT: Lovenox: GI: PPI  Code status: Full  Lines: R PIV, L triple lumen PICC, R IJ CVC, patel, rectal tube      Patient seen and discussed with Dr. Rogers who agrees with the assessment and plan    Raymon Pedro MD  PGY2  [phone]      Interval Events    Patient awakened and began following commands     Objective    Temperatures:  Current - Temp: 98.7  F (37.1  C); Max - Temp  Av.1  F (36.7  C)  Min: 97.4  F (36.3  C)  Max: 98.7  F (37.1  C)  Respiration range: Resp  Av  Min: 16  Max: 31  Pulse range: Heart Rate  Av.5  Min: 72  Max: 112  Blood pressure range: Systolic (24hrs), Av , Min:127 , Max:159   ; Diastolic (24hrs), Av, Min:73, Max:116    Pulse oximetry range: SpO2  Av.4 %  Min: 91 %  Max: 98 %    Ventilation Mode: CPAP/PS  FiO2 (%): 35 %  Rate Set (breaths/minute): 18 breaths/min  Tidal Volume Set (mL): 480 mL  PEEP (cm H2O): 8 cmH2O  Pressure Support (cm H2O): 7 cmH2O  Oxygen Concentration (%): 35 %  Resp: 16    Physical Exam    ROUTINE ICU LABS (Last four results)  CMP  Recent Labs  Lab 17  0429 17  2004 17  1619 17  1110 17  0359  17  0324  17  0225   *  --  148* 147* 149*  < > 149*  --  144   POTASSIUM 4.3  --  4.3 4.0 4.0  < > 3.3*  < > 3.0*   CHLORIDE 113*   --  113* 112* 111*  < > 102  --  90*   CO2 27  --  30 27 28  < > 39*  --  >45*   ANIONGAP 7  --  5 8 9  < > 8  --  Not Calculated   *  --  174* 232* 197*  < > 175*  --  139*   BUN 62*  --  67* 63* 66*  < > 79*  --  45*   CR 0.65  --  0.64 0.65 0.67  < > 1.18*  --  1.28*   GFRESTIMATED >90  --  >90 >90 >90  < > 49*  --  45*   GFRESTBLACK >90  --  >90 >90 >90  < > 59*  --  54*   ISAURO 7.8*  --  7.8* 8.1* 8.2*  < > 8.2*  --  7.5*   MAG 2.3 2.5* 2.5* 2.2 2.4*  < > 2.6*  < > 2.5*   PHOS 2.8 2.6 2.6  --  1.9*  < > 2.9  < > 3.1   PROTTOTAL 4.8*  --   --   --  5.5*  --  5.2*  --  5.5*   ALBUMIN 2.2*  --   --   --  2.8*  --  2.9*  --  3.6   BILITOTAL 5.9*  --   --   --  8.4*  --  6.0*  --  6.2*   ALKPHOS 771*  --   --   --  814*  --  591*  --  346*   *  --   --   --  268*  --  241*  --  217*   *  --   --   --  151*  --  92*  --  66*   < > = values in this interval not displayed.  CBC  Recent Labs  Lab 12/24/17  0429 12/23/17  0359 12/22/17  0324 12/21/17  0225   WBC 22.7* 29.5* 30.6* 21.5*   RBC 2.62* 2.93* 2.54* 2.49*   HGB 8.1* 8.9* 7.9* 7.7*   HCT 24.9* 27.7* 24.3* 23.1*   MCV 95 95 96 93   MCH 30.9 30.4 31.1 30.9   MCHC 32.5 32.1 32.5 33.3   RDW 19.5* 19.0* 18.1* 17.6*    149* 98* 89*     INR  Recent Labs  Lab 12/22/17  0324 12/19/17  0545 12/18/17  0352   INR 1.53* 1.73* 1.62*     Arterial Blood Gas  Recent Labs  Lab 12/24/17  0340 12/23/17  1616 12/23/17  0753 12/22/17  1135   PH 7.48* 7.49* 7.50* 7.46*   PCO2 38 37 40 49*   PO2 77* 69* 85 74*   HCO3 28 28 31* 34*   O2PER 35 35.0 35 35.0       Physical exam:  General: Patient lying comfortably in bed, NAD, responding to commands   HEENT: EOMI, no scleral icterus or injection, no bruits appreciated, no JVD  Cardiac: RRR, no m/r/g appreciated.   Respiratory: CTAB, no wheezes, rhonchi or crackles appreciated.  GI: NABS, NT/ND, no guarding or rebound  Extremities: Trace LE edema, pulses DP 2+, radial pulses 2+   Skin: No acute lesions  appreciated  Neuro: Alert and oriented, CN II-XII grossly intact, normal muscle power, normal sensory function    Imaging   CXR     1. Stable to mildly increased diffuse pulmonary opacities, which may  represent pulmonary edema, infection, or ARDS.  2. Stable support devices.

## 2017-12-25 ENCOUNTER — APPOINTMENT (OUTPATIENT)
Dept: SPEECH THERAPY | Facility: CLINIC | Age: 47
DRG: 917 | End: 2017-12-25
Attending: SURGERY
Payer: COMMERCIAL

## 2017-12-25 ENCOUNTER — APPOINTMENT (OUTPATIENT)
Dept: GENERAL RADIOLOGY | Facility: CLINIC | Age: 47
DRG: 917 | End: 2017-12-25
Attending: SURGERY
Payer: COMMERCIAL

## 2017-12-25 LAB
ALBUMIN SERPL-MCNC: 2.5 G/DL (ref 3.4–5)
ALP SERPL-CCNC: 852 U/L (ref 40–150)
ALT SERPL W P-5'-P-CCNC: 138 U/L (ref 0–50)
ANION GAP SERPL CALCULATED.3IONS-SCNC: 10 MMOL/L (ref 3–14)
ANION GAP SERPL CALCULATED.3IONS-SCNC: 8 MMOL/L (ref 3–14)
ANISOCYTOSIS BLD QL SMEAR: SLIGHT
AST SERPL W P-5'-P-CCNC: 181 U/L (ref 0–45)
BASE EXCESS BLDV CALC-SCNC: 8.8 MMOL/L
BASOPHILS # BLD AUTO: 0 10E9/L (ref 0–0.2)
BASOPHILS NFR BLD AUTO: 0 %
BILIRUB DIRECT SERPL-MCNC: 3.7 MG/DL (ref 0–0.2)
BILIRUB SERPL-MCNC: 4.8 MG/DL (ref 0.2–1.3)
BUN SERPL-MCNC: 54 MG/DL (ref 7–30)
BUN SERPL-MCNC: 64 MG/DL (ref 7–30)
CALCIUM SERPL-MCNC: 7.4 MG/DL (ref 8.5–10.1)
CALCIUM SERPL-MCNC: 7.6 MG/DL (ref 8.5–10.1)
CHLORIDE SERPL-SCNC: 103 MMOL/L (ref 94–109)
CHLORIDE SERPL-SCNC: 112 MMOL/L (ref 94–109)
CO2 SERPL-SCNC: 28 MMOL/L (ref 20–32)
CO2 SERPL-SCNC: 30 MMOL/L (ref 20–32)
CREAT SERPL-MCNC: 0.73 MG/DL (ref 0.52–1.04)
CREAT SERPL-MCNC: 0.78 MG/DL (ref 0.52–1.04)
CRP SERPL-MCNC: 19 MG/L (ref 0–8)
DIFFERENTIAL METHOD BLD: ABNORMAL
EOSINOPHIL # BLD AUTO: 0.2 10E9/L (ref 0–0.7)
EOSINOPHIL NFR BLD AUTO: 0.9 %
ERYTHROCYTE [DISTWIDTH] IN BLOOD BY AUTOMATED COUNT: 20.7 % (ref 10–15)
GFR SERPL CREATININE-BSD FRML MDRD: 79 ML/MIN/1.7M2
GFR SERPL CREATININE-BSD FRML MDRD: 85 ML/MIN/1.7M2
GLUCOSE BLDC GLUCOMTR-MCNC: 101 MG/DL (ref 70–99)
GLUCOSE BLDC GLUCOMTR-MCNC: 109 MG/DL (ref 70–99)
GLUCOSE BLDC GLUCOMTR-MCNC: 119 MG/DL (ref 70–99)
GLUCOSE BLDC GLUCOMTR-MCNC: 119 MG/DL (ref 70–99)
GLUCOSE BLDC GLUCOMTR-MCNC: 130 MG/DL (ref 70–99)
GLUCOSE BLDC GLUCOMTR-MCNC: 174 MG/DL (ref 70–99)
GLUCOSE BLDC GLUCOMTR-MCNC: 175 MG/DL (ref 70–99)
GLUCOSE SERPL-MCNC: 197 MG/DL (ref 70–99)
GLUCOSE SERPL-MCNC: 94 MG/DL (ref 70–99)
HCO3 BLDV-SCNC: 33 MMOL/L (ref 21–28)
HCT VFR BLD AUTO: 27.2 % (ref 35–47)
HGB BLD-MCNC: 8.7 G/DL (ref 11.7–15.7)
LACTATE BLD-SCNC: 1 MMOL/L (ref 0.7–2)
LYMPHOCYTES # BLD AUTO: 1.2 10E9/L (ref 0.8–5.3)
LYMPHOCYTES NFR BLD AUTO: 5.2 %
MAGNESIUM SERPL-MCNC: 1.8 MG/DL (ref 1.6–2.3)
MCH RBC QN AUTO: 30.3 PG (ref 26.5–33)
MCHC RBC AUTO-ENTMCNC: 32 G/DL (ref 31.5–36.5)
MCV RBC AUTO: 95 FL (ref 78–100)
MONOCYTES # BLD AUTO: 0.2 10E9/L (ref 0–1.3)
MONOCYTES NFR BLD AUTO: 0.9 %
MYELOCYTES # BLD: 0.6 10E9/L
MYELOCYTES NFR BLD MANUAL: 2.6 %
NEUTROPHILS # BLD AUTO: 21.2 10E9/L (ref 1.6–8.3)
NEUTROPHILS NFR BLD AUTO: 90.4 %
O2/TOTAL GAS SETTING VFR VENT: 35 %
OXYHGB MFR BLDV: 63 %
PCO2 BLDV: 41 MM HG (ref 40–50)
PH BLDV: 7.51 PH (ref 7.32–7.43)
PHOSPHATE SERPL-MCNC: 3 MG/DL (ref 2.5–4.5)
PLATELET # BLD AUTO: 249 10E9/L (ref 150–450)
PO2 BLDV: 35 MM HG (ref 25–47)
POLYCHROMASIA BLD QL SMEAR: SLIGHT
POTASSIUM SERPL-SCNC: 3.5 MMOL/L (ref 3.4–5.3)
POTASSIUM SERPL-SCNC: 3.6 MMOL/L (ref 3.4–5.3)
PROCALCITONIN SERPL-MCNC: 3.26 NG/ML
PROT SERPL-MCNC: 5.2 G/DL (ref 6.8–8.8)
RBC # BLD AUTO: 2.87 10E12/L (ref 3.8–5.2)
SODIUM SERPL-SCNC: 140 MMOL/L (ref 133–144)
SODIUM SERPL-SCNC: 150 MMOL/L (ref 133–144)
VIT B12 SERPL-MCNC: 2625 PG/ML (ref 193–986)
WBC # BLD AUTO: 23.4 10E9/L (ref 4–11)

## 2017-12-25 PROCEDURE — 71010 XR CHEST PORT 1 VW: CPT

## 2017-12-25 PROCEDURE — 25000132 ZZH RX MED GY IP 250 OP 250 PS 637: Performed by: PHYSICIAN ASSISTANT

## 2017-12-25 PROCEDURE — 40000983 ZZH STATISTIC HFNC ADULT NON-CPAP

## 2017-12-25 PROCEDURE — 99291 CRITICAL CARE FIRST HOUR: CPT | Performed by: INTERNAL MEDICINE

## 2017-12-25 PROCEDURE — 25000125 ZZHC RX 250: Performed by: INTERNAL MEDICINE

## 2017-12-25 PROCEDURE — 82607 VITAMIN B-12: CPT | Performed by: INTERNAL MEDICINE

## 2017-12-25 PROCEDURE — 80048 BASIC METABOLIC PNL TOTAL CA: CPT | Performed by: STUDENT IN AN ORGANIZED HEALTH CARE EDUCATION/TRAINING PROGRAM

## 2017-12-25 PROCEDURE — 25000132 ZZH RX MED GY IP 250 OP 250 PS 637: Performed by: INTERNAL MEDICINE

## 2017-12-25 PROCEDURE — 25000125 ZZHC RX 250: Performed by: STUDENT IN AN ORGANIZED HEALTH CARE EDUCATION/TRAINING PROGRAM

## 2017-12-25 PROCEDURE — 25000128 H RX IP 250 OP 636: Performed by: INTERNAL MEDICINE

## 2017-12-25 PROCEDURE — 20000004 ZZH R&B ICU UMMC

## 2017-12-25 PROCEDURE — 94640 AIRWAY INHALATION TREATMENT: CPT | Mod: 76

## 2017-12-25 PROCEDURE — 80053 COMPREHEN METABOLIC PANEL: CPT | Performed by: INTERNAL MEDICINE

## 2017-12-25 PROCEDURE — 99207 ZZC APP CREDIT; MD BILLING SHARED VISIT: CPT | Performed by: PHYSICIAN ASSISTANT

## 2017-12-25 PROCEDURE — 82805 BLOOD GASES W/O2 SATURATION: CPT | Performed by: INTERNAL MEDICINE

## 2017-12-25 PROCEDURE — 82248 BILIRUBIN DIRECT: CPT | Performed by: INTERNAL MEDICINE

## 2017-12-25 PROCEDURE — 83735 ASSAY OF MAGNESIUM: CPT | Performed by: STUDENT IN AN ORGANIZED HEALTH CARE EDUCATION/TRAINING PROGRAM

## 2017-12-25 PROCEDURE — 00000146 ZZHCL STATISTIC GLUCOSE BY METER IP

## 2017-12-25 PROCEDURE — 92610 EVALUATE SWALLOWING FUNCTION: CPT | Mod: GN

## 2017-12-25 PROCEDURE — 84100 ASSAY OF PHOSPHORUS: CPT | Performed by: INTERNAL MEDICINE

## 2017-12-25 PROCEDURE — 25000132 ZZH RX MED GY IP 250 OP 250 PS 637: Performed by: STUDENT IN AN ORGANIZED HEALTH CARE EDUCATION/TRAINING PROGRAM

## 2017-12-25 PROCEDURE — 85025 COMPLETE CBC W/AUTO DIFF WBC: CPT | Performed by: INTERNAL MEDICINE

## 2017-12-25 PROCEDURE — 40000275 ZZH STATISTIC RCP TIME EA 10 MIN

## 2017-12-25 PROCEDURE — 25000128 H RX IP 250 OP 636: Performed by: STUDENT IN AN ORGANIZED HEALTH CARE EDUCATION/TRAINING PROGRAM

## 2017-12-25 PROCEDURE — 99233 SBSQ HOSP IP/OBS HIGH 50: CPT | Performed by: INTERNAL MEDICINE

## 2017-12-25 PROCEDURE — 84145 PROCALCITONIN (PCT): CPT | Performed by: INTERNAL MEDICINE

## 2017-12-25 PROCEDURE — 94640 AIRWAY INHALATION TREATMENT: CPT

## 2017-12-25 PROCEDURE — 40000225 ZZH STATISTIC SLP WARD VISIT

## 2017-12-25 PROCEDURE — 40000047 ZZH STATISTIC CTO2 CONT OXYGEN TECH TIME EA 90 MIN

## 2017-12-25 PROCEDURE — 86140 C-REACTIVE PROTEIN: CPT | Performed by: INTERNAL MEDICINE

## 2017-12-25 PROCEDURE — 83605 ASSAY OF LACTIC ACID: CPT | Performed by: INTERNAL MEDICINE

## 2017-12-25 RX ORDER — HYDROMORPHONE HYDROCHLORIDE 1 MG/ML
.3-.5 INJECTION, SOLUTION INTRAMUSCULAR; INTRAVENOUS; SUBCUTANEOUS
Status: DISCONTINUED | OUTPATIENT
Start: 2017-12-25 | End: 2017-12-25

## 2017-12-25 RX ORDER — IBUPROFEN 100 MG/5ML
400-600 SUSPENSION, ORAL (FINAL DOSE FORM) ORAL EVERY 8 HOURS PRN
Status: DISCONTINUED | OUTPATIENT
Start: 2017-12-25 | End: 2017-12-29

## 2017-12-25 RX ORDER — IBUPROFEN 100 MG/5ML
200 SUSPENSION, ORAL (FINAL DOSE FORM) ORAL EVERY 8 HOURS PRN
Status: DISCONTINUED | OUTPATIENT
Start: 2017-12-25 | End: 2017-12-25

## 2017-12-25 RX ORDER — METHYLPREDNISOLONE SODIUM SUCCINATE 40 MG/ML
20 INJECTION, POWDER, LYOPHILIZED, FOR SOLUTION INTRAMUSCULAR; INTRAVENOUS EVERY 12 HOURS
Status: DISCONTINUED | OUTPATIENT
Start: 2017-12-25 | End: 2017-12-25

## 2017-12-25 RX ORDER — PREDNISONE 20 MG/1
20 TABLET ORAL DAILY
Status: COMPLETED | OUTPATIENT
Start: 2017-12-26 | End: 2017-12-28

## 2017-12-25 RX ORDER — IBUPROFEN 100 MG/1
200 TABLET, CHEWABLE ORAL EVERY 8 HOURS PRN
Status: DISCONTINUED | OUTPATIENT
Start: 2017-12-25 | End: 2017-12-25

## 2017-12-25 RX ORDER — LORAZEPAM 2 MG/ML
1-2 INJECTION INTRAMUSCULAR EVERY 4 HOURS PRN
Status: DISCONTINUED | OUTPATIENT
Start: 2017-12-25 | End: 2018-01-01

## 2017-12-25 RX ORDER — LORAZEPAM 2 MG/ML
1-2 INJECTION INTRAMUSCULAR EVERY 4 HOURS PRN
Status: DISCONTINUED | OUTPATIENT
Start: 2017-12-25 | End: 2017-12-25

## 2017-12-25 RX ORDER — ONDANSETRON HYDROCHLORIDE 4 MG/5ML
4 SOLUTION ORAL EVERY 6 HOURS PRN
Status: DISCONTINUED | OUTPATIENT
Start: 2017-12-25 | End: 2018-01-05 | Stop reason: HOSPADM

## 2017-12-25 RX ORDER — FENTANYL CITRATE 50 UG/ML
25-50 INJECTION, SOLUTION INTRAMUSCULAR; INTRAVENOUS
Status: DISCONTINUED | OUTPATIENT
Start: 2017-12-25 | End: 2017-12-25

## 2017-12-25 RX ORDER — FUROSEMIDE 10 MG/ML
20 INJECTION INTRAMUSCULAR; INTRAVENOUS EVERY 6 HOURS
Status: COMPLETED | OUTPATIENT
Start: 2017-12-25 | End: 2017-12-25

## 2017-12-25 RX ORDER — LANOLIN ALCOHOL/MO/W.PET/CERES
100 CREAM (GRAM) TOPICAL DAILY
Status: DISCONTINUED | OUTPATIENT
Start: 2017-12-26 | End: 2018-01-05 | Stop reason: HOSPADM

## 2017-12-25 RX ORDER — METHYLPREDNISOLONE SODIUM SUCCINATE 40 MG/ML
20 INJECTION, POWDER, LYOPHILIZED, FOR SOLUTION INTRAMUSCULAR; INTRAVENOUS EVERY 12 HOURS
Status: DISCONTINUED | OUTPATIENT
Start: 2017-12-25 | End: 2017-12-25 | Stop reason: ALTCHOICE

## 2017-12-25 RX ORDER — OXYCODONE HYDROCHLORIDE 5 MG/1
5-10 TABLET ORAL
Status: DISCONTINUED | OUTPATIENT
Start: 2017-12-25 | End: 2017-12-25

## 2017-12-25 RX ORDER — OXYCODONE HYDROCHLORIDE 5 MG/1
5-10 TABLET ORAL EVERY 4 HOURS PRN
Status: COMPLETED | OUTPATIENT
Start: 2017-12-25 | End: 2017-12-26

## 2017-12-25 RX ORDER — OXYCODONE HYDROCHLORIDE 5 MG/1
5-10 TABLET ORAL EVERY 4 HOURS PRN
Status: DISCONTINUED | OUTPATIENT
Start: 2017-12-25 | End: 2017-12-25

## 2017-12-25 RX ORDER — LORAZEPAM 1 MG/1
1-2 TABLET ORAL EVERY 4 HOURS PRN
Status: DISCONTINUED | OUTPATIENT
Start: 2017-12-25 | End: 2018-01-02

## 2017-12-25 RX ADMIN — FUROSEMIDE 20 MG: 10 INJECTION, SOLUTION INTRAVENOUS at 09:53

## 2017-12-25 RX ADMIN — LORAZEPAM 1 MG: 1 TABLET ORAL at 11:27

## 2017-12-25 RX ADMIN — Medication 3 MG: at 14:05

## 2017-12-25 RX ADMIN — Medication 125 MG: at 11:29

## 2017-12-25 RX ADMIN — LORAZEPAM 1 MG: 2 INJECTION INTRAMUSCULAR; INTRAVENOUS at 09:19

## 2017-12-25 RX ADMIN — LORAZEPAM 2 MG: 1 TABLET ORAL at 23:19

## 2017-12-25 RX ADMIN — CARVEDILOL 6.25 MG: 25 TABLET, FILM COATED ORAL at 08:56

## 2017-12-25 RX ADMIN — Medication 125 MG: at 15:33

## 2017-12-25 RX ADMIN — METRONIDAZOLE 500 MG: 500 INJECTION, SOLUTION INTRAVENOUS at 03:00

## 2017-12-25 RX ADMIN — FUROSEMIDE 20 MG: 10 INJECTION, SOLUTION INTRAVENOUS at 21:50

## 2017-12-25 RX ADMIN — FENTANYL CITRATE 25 MCG: 50 INJECTION INTRAMUSCULAR; INTRAVENOUS at 03:11

## 2017-12-25 RX ADMIN — OLANZAPINE 5 MG: 5 TABLET, ORALLY DISINTEGRATING ORAL at 08:56

## 2017-12-25 RX ADMIN — Medication 125 MG: at 20:00

## 2017-12-25 RX ADMIN — FUROSEMIDE 20 MG: 10 INJECTION, SOLUTION INTRAVENOUS at 15:44

## 2017-12-25 RX ADMIN — Medication 125 MG: at 08:56

## 2017-12-25 RX ADMIN — OXYCODONE HYDROCHLORIDE 5 MG: 5 TABLET ORAL at 14:02

## 2017-12-25 RX ADMIN — CHLOROTHIAZIDE SODIUM 250 MG: 500 INJECTION INTRAVENOUS at 02:26

## 2017-12-25 RX ADMIN — Medication 0.1 MG: at 20:00

## 2017-12-25 RX ADMIN — Medication 3 MG: at 20:00

## 2017-12-25 RX ADMIN — Medication 0.3 MG: at 09:40

## 2017-12-25 RX ADMIN — POTASSIUM CHLORIDE 20 MEQ: 750 TABLET, EXTENDED RELEASE ORAL at 20:01

## 2017-12-25 RX ADMIN — Medication 0.1 MG: at 09:41

## 2017-12-25 RX ADMIN — ENOXAPARIN SODIUM 40 MG: 40 INJECTION SUBCUTANEOUS at 11:32

## 2017-12-25 RX ADMIN — LEVOTHYROXINE SODIUM 50 MCG: 25 TABLET ORAL at 08:56

## 2017-12-25 RX ADMIN — METHYLPREDNISOLONE 40 MG: 40 INJECTION, POWDER, LYOPHILIZED, FOR SOLUTION INTRAMUSCULAR; INTRAVENOUS at 06:51

## 2017-12-25 RX ADMIN — IPRATROPIUM BROMIDE AND ALBUTEROL SULFATE 3 ML: .5; 3 SOLUTION RESPIRATORY (INHALATION) at 20:15

## 2017-12-25 RX ADMIN — Medication 1 PACKET: at 11:29

## 2017-12-25 RX ADMIN — MIRTAZAPINE 15 MG: 15 TABLET, FILM COATED ORAL at 21:50

## 2017-12-25 RX ADMIN — Medication 2 G: at 20:01

## 2017-12-25 RX ADMIN — IPRATROPIUM BROMIDE AND ALBUTEROL SULFATE 3 ML: .5; 3 SOLUTION RESPIRATORY (INHALATION) at 16:22

## 2017-12-25 RX ADMIN — OLANZAPINE 5 MG: 5 TABLET, ORALLY DISINTEGRATING ORAL at 20:01

## 2017-12-25 RX ADMIN — LORAZEPAM 1 MG: 1 TABLET ORAL at 19:07

## 2017-12-25 RX ADMIN — OXYCODONE HYDROCHLORIDE 10 MG: 5 TABLET ORAL at 21:50

## 2017-12-25 RX ADMIN — DEXMEDETOMIDINE HYDROCHLORIDE 0.7 MCG/KG/HR: 4 INJECTION, SOLUTION INTRAVENOUS at 07:45

## 2017-12-25 RX ADMIN — FOLIC ACID 1 MG: 5 INJECTION, SOLUTION INTRAMUSCULAR; INTRAVENOUS; SUBCUTANEOUS at 09:18

## 2017-12-25 RX ADMIN — IBUPROFEN 200 MG: 200 SUSPENSION ORAL at 15:33

## 2017-12-25 RX ADMIN — LORAZEPAM 1 MG: 2 INJECTION INTRAMUSCULAR; INTRAVENOUS at 04:08

## 2017-12-25 RX ADMIN — THIAMINE HYDROCHLORIDE 100 MG: 100 INJECTION, SOLUTION INTRAMUSCULAR; INTRAVENOUS at 08:56

## 2017-12-25 RX ADMIN — ONDANSETRON HYDROCHLORIDE 4 MG: 4 SOLUTION ORAL at 16:38

## 2017-12-25 RX ADMIN — FENTANYL CITRATE 25 MCG: 50 INJECTION INTRAMUSCULAR; INTRAVENOUS at 01:47

## 2017-12-25 RX ADMIN — PANTOPRAZOLE SODIUM 40 MG: 40 INJECTION, POWDER, FOR SOLUTION INTRAVENOUS at 09:06

## 2017-12-25 RX ADMIN — CARVEDILOL 6.25 MG: 25 TABLET, FILM COATED ORAL at 20:03

## 2017-12-25 RX ADMIN — IPRATROPIUM BROMIDE AND ALBUTEROL SULFATE 3 ML: .5; 3 SOLUTION RESPIRATORY (INHALATION) at 08:16

## 2017-12-25 RX ADMIN — OXYCODONE HYDROCHLORIDE 10 MG: 5 TABLET ORAL at 17:09

## 2017-12-25 RX ADMIN — Medication 0.1 MG: at 14:05

## 2017-12-25 RX ADMIN — IPRATROPIUM BROMIDE AND ALBUTEROL SULFATE 3 ML: .5; 3 SOLUTION RESPIRATORY (INHALATION) at 12:13

## 2017-12-25 RX ADMIN — POTASSIUM CHLORIDE 20 MEQ: 1.5 POWDER, FOR SOLUTION ORAL at 21:50

## 2017-12-25 ASSESSMENT — PAIN DESCRIPTION - DESCRIPTORS
DESCRIPTORS: PATIENT UNABLE TO DESCRIBE
DESCRIPTORS: CRAMPING

## 2017-12-25 NOTE — CONSULTS
"DATE OF SERVICE:  12/24/2017        REASON FOR CONSULTATION:  The ICU Service requested a consultation to assist with management and ultimate placement disposition of this patient who presented with a suicide attempt.      HISTORY OF PRESENT ILLNESS:  Ana Laura Wharton is a 47-year-old female who was initially admitted to the Intensive Care Unit after a suicide attempt via overdose with amlodipine, Nyquil, Tylenol and vodka.  She was initially seen by Dr. William Mccann on 12/14/2017.  At that time Dr. Mccann reviewed her history of rapid cycling bipolar disorder and also alcohol and marijuana use, multiple previous hospitalizations, 1 chemical dependency treatment.  When Dr. Mccann saw the patient, she was intubated, sedated, unresponsive.  Dr. Mccann's assessment was polysubstance use disorder and rapid cycling bipolar disorder.  He recommended reconsulting Psychiatry when she regained consciousness.      Her outpatient records from Dr. Rodriguez from this past summer and fall were reviewed.  At that time the patient had been recent fairly stable, but had come off her lithium due to what was described as \"toxicity\" and prior to that had been on lithium, Remeron, Klonopin and Vistaril.  Dr. Rodriguez added Zyprexa 5 mg b.i.d. as the patient did not want to go back on a mood stabilizer.  She was last seen on 10/26/2017 by Dr. Rodriguez and was maintained on Zyprexa 5 mg b.i.d.      The patient was just extubated today.      CURRENT MEDICATIONS:     1.  Remeron 15 mg at bedtime.   2.  Olanzapine 2.5 mg q.8 h. p.r.n.   3.  Olanzapine 5 mg b.i.d.      The patient was somewhat confused and dysarthric.  She was unable to articulate any specific complaints.      VITAL SIGNS:  Temperature 97.8, respirations 19, pulse 81, blood pressure 134/86.        MENTAL STATUS EXAMINATION:     General appearance and behavior:  The patient was lying in her ICU bed.  Her speech is very hypophonic and dysarthric.  She at times was quite " "confused.     Mood and affect:  The patient stated that she was depressed.  Her affect was flat.     Thought process and associations:  The patient's thought processes were at times confused, other time she was able to answer a fairly directly to questions about her mood and other psychiatric issues.     Thought content:  Patient states she has been hearing auditory hallucinations, stating things like \"they don't understand,\" \"you're worthless.\"  Also \"you're a pain in the butt.\"  She denies visual hallucinations.  She does state that she wishes to be dead.     Speech and language:  Speech is hypophonic and dysarthric.  Use of language is appropriate.   Attention and concentration:  Impaired.   Orientation:  The patient is unable to tell me where we are, the date or anything, although she does realize she is in the hospital.   Recent and remote memory:  Quite impaired.     Fund of knowledge:  Difficult to assess.     Judgment and insight:  Impaired.     Muscle bulk and tone:  Appear normal.   Abnormal movements:  None noted.      IMPRESSION:  The patient is a 47-year-old female with a long complicated psychiatric history with multiple previous suicide attempts, a history of rapid cycling bipolar disorder and substance dependence who currently is in the ICU after a very serious overdose.  She currently appears depressed, suicidal and psychotic.        DSM DIAGNOSES:  1. Delirium   2.  Bipolar disorder, rapid cycling.   3.  Polysubstance use disorder.      TREATMENT RECOMMENDATIONS:   1.  The patient has been maintained on Zyprexa as she is right now.  As she has just been extubated and a number of medication adjustments made as a result of that, it will be difficult to say how she will do in terms of agitation and psychosis.  Would consider using p.r.n.'s as have been ordered, olanzapine 2.5 mg q.8.  If her blood pressure tolerates this, this could be increased to 2.5 to 5 mg q.4-6 h.  Another option would be if the " olanzapine ends up being too sedating or ineffective, could add some Haldol to it in doses roughly of 2.5 to 5 b.i.d. and 2.5 to 5 q.4-6 h. p.r.n.  This would be if her agitation is not responsive to olanzapine.  It is appropriate to use both of these drugs together if necessary.   2.  The patient is on Remeron 15, to continue this as she begins to clear, assess her depression and possibly increase.   3.  The patient has been reluctant to go on mood stabilizing agents in the past.  When she is more stable, we will discuss her case with Dr. Rodriguez from Psychiatry.  Please reconsult as patient clears and is able to carry on a clinical interview so that we can get a better handle on where she is at.   4.  This patient will need a 1:1 sitter if she goes to a general medical unit.  She will need to be assessed for further disposition, which likely will be inpatient psychiatry.   5.  Please call or reconsult with any questions, concerns or change in the patient's status.  My number is [phone].         JOVANNA CARRERA MD             D: 2017 15:59   T: 2017 18:49   MT: JODY      Name:     KEE TINEO   MRN:      [id number]        Account:       [id number]   :      1970           Consult Date:  2017      Document: [id number]

## 2017-12-25 NOTE — PROGRESS NOTES
MICU Attending Note:  December 25, 2017    I saw and examined the patient with the resident and fellow during daily multidisciplinary round. I reviewed the labs, imaging studies and cultures.  Case discussed with family members at bed side.  Please see MICU resident's note from (December 25, 2017) which we formulated together and represent our mutual findings, assessment and plan.   The patient is critically ill due to  - Overdose of amlodipine and acetaminophen, completed NAC protocol  - Resp failure due to ARDS, initially proned, extubated 12/24  - Anxiety on precedex, clonidine and ativan, wean off precedex as able  - Hypertension on carvedilol and prn hydralazine  - EDUARDO and lactic acidosis, improved    Critical care time is 35 min excluding procedure time.    H. Cullen Gutierrez MD  [phone]

## 2017-12-25 NOTE — PLAN OF CARE
"Problem: Patient Care Overview  Goal: Plan of Care/Patient Progress Review  Outcome: Improving  D: Pt with suicide attempt via Ca channel blocker, Tylenol, and ETOH overdose  A/I: At start of shift pt sedated on 6mg versed/100 fentanyl and precedex on P/S trial and tolerating well. Per MDs, Sedation weaned throughout morning and turned off in preparation of extubation. Concerns for quick decrease in sedation voiced to MDs 2/2 risk for withdrawal. Pt extubate ~1300 to HFNC @ 35%, Flow 40. Following extubation the first thing pt stated was \"I want to die\". MDs aware, Psych consulted and were by to see Pt, continue to follow. Pt oriented to self and place. Pt voices sadness and depression, feelings of anxiety. Intermittently crying. LS course, fair cough, occasionally able to cough up mucus. Bedside speech eval performed while patient up in chair, occasianal signs of possible slight aspiration with sips of water. MDs aware. Formal speech consult placed. RT in place with moderate output. Napoles in place, pt diuresed with lasix x2 with good UOP. Lyes to be replaced.  P: Continue to monitor pts respiratory status. Monitor pt for s/s of withdrawal. Alert team to any changes.        "

## 2017-12-25 NOTE — PLAN OF CARE
Problem: Patient Care Overview  Goal: Plan of Care/Patient Progress Review  Discharge Planner SLP   Patient plan for discharge: patient did not state  Current status: Clinical dysphagia examination completed per MD order. The patient presents with mild oropharyngeal dysphagia marked by generalized deconditioning, but overall coordination appears intact. She has a baseline cough, present in and out of the context of swallows, no change in cough with modified PO presentations. Recommend the patient initiate dysphagia diet 2 and thin liquids with supervision and assistance for safety precautions. SLP to follow for diet tolerance and readiness to safely advance diet level.  Barriers to return to prior living situation: weakness, modified diet level  Recommendations for discharge: defer to OT/PT, psych consult  Rationale for recommendations: anticipate patient will reach SLP goals prior to hospital discharge       Entered by: Kiarra Kennedy 12/25/2017 9:58 AM

## 2017-12-25 NOTE — PLAN OF CARE
"Problem: Patient Care Overview  Goal: Plan of Care/Patient Progress Review  Outcome: Improving  D/I/A: Tylenol, calcium channel, and ETOH overdose.  Neuro: A/O to self and sometimes place, forgetful. Often restless, pulls off oxygen, anxious, frustrated, and frequent requests for water or \"something to put me out.\"  At first denies that she attempted suicide, shortly after confrms that she did attempt suicide. States she would like to die. Later C/O pain all over. MD paged and came to bedside to assess. Received orders for PRN fentanyl. Later when asked what she was doing states, \"Trying to masterbate so I can sleep.\" Did not sleep much overnight. Says she does have difficulties sleeping at home as well. Asked the male attendant if he liked sex. Requested that the female attendant who she referred to as \"Katlin\" not be in her room.  CV: NSR; HR 60-100s. BP stable.  Resp: HFNC 30-40%. Weak cough. LS cough.  /GI: NPO pending swallow eval with speech therapy.  mL liquid brown stool out. Napoles d/zander. Voiding on bedpan.  Skin: Bilateral cheek wounds cleansed and Vaseline applied. Left arm dressing C/D/I.       P: Swallow evaluation with speech therapy. Wean precedex as tolerated. Continue to monitor per POC.      "
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
## 2017-12-25 NOTE — PROGRESS NOTES
12/25/17 0900   General Information   Onset Date 12/13/17   Start of Care Date 12/25/17   Referring Physician Kami Feliciano MD   Patient Profile Review/OT: Additional Occupational Profile Info See Profile for full history and prior level of function   Patient/Family Goals Statement patient requesting H20, apple juice & a banana   Swallowing Evaluation Bedside swallow evaluation   Behaviorial Observations Other (Comment);Lethargic  (withdrawn)   Mode of current nutrition NPO   Respiratory Status Other (see comments);O2 Supply  (extubated 12/24/17)   Type of O2 supply High flow face mask   Comments 48 yo F with h/o bipolar DO and polysubstance abuse with prior suicide attempts -admitted 12/13/17 with OD of calcium channel blocker, tylenol and ETOH.    Clinical Swallow Evaluation   Oral Musculature unable to assess due to poor participation/comprehension  (appears generally intact)   Structural Abnormalities none present   Dentition present and adequate   Secretion Management other (see comments)  (baseline cough, managing secretions WFL)   Mucosal Quality adequate   Mandibular Strength and Mobility intact   Oral Labial Strength and Mobility WFL   Lingual Strength and Mobility WFL   Laryngeal Function Cough;Throat clear;Swallow;Voicing initiated;Dry swallow palpated   Clinical Swallow Eval: Thin Liquid Texture Trial   Mode of Presentation, Thin Liquids spoon;cup;straw;fed by clinician   Volume of Liquid or Food Presented 5 oz thin h20   Oral Phase of Swallow WFL   Pharyngeal Phase of Swallow reduction in laryngeal movement   Diagnostic Statement Patient has baseline cough regardless of what is presented. Appears WFL with small sips of thin H20.   Clinical Swallow Eval: Nectar Thick Liquid Texture Trial   Mode of Presentation, Nectar spoon;straw;fed by clinician   Volume of Nectar Presented 4 oz nectar thick h20   Oral Phase, Nectar WFL   Pharyngeal Phase, Nectar reduction in laryngeal movement   Diagnostic  Statement Patient has baseline cough regardless of what is presented. Appears WFL with small sips of nectar thick liquid.   Clinical Swallow Eval: Puree Solid Texture Trial   Mode of Presentation, Puree spoon;fed by clinician   Volume of Puree Presented 4 oz applesauce   Oral Phase, Puree WFL   Pharyngeal Phase, Puree reduction in laryngeal movement   Diagnostic Statement Patient has baseline cough regardless of what is presented. Apepars WFL with puree.   Clinical Swallow Eval: Semisolid Texture Trial   Mode of Presentation, Semisolid spoon;fed by clinician   Volume of Semisolid Food Presented garcia cracker dipped in applesauce   Oral Phase, Semisolid WFL   Oral Residue, Semisolid other (see comments)  (none)   Pharyngeal Phase, Semisolid reduction in laryngeal movement   Diagnostic Statement Patient has baseline cough regardless of what is presented. Appears WFL with semi solid texture. Patient refused trying advanced solids.   Esophageal Phase of Swallow   Patient reports or presents with symptoms of esophageal dysphagia No   General Therapy Interventions   Planned Therapy Interventions Dysphagia Treatment   Swallow Eval: Clinical Impressions   Skilled Criteria for Therapy Intervention Skilled criteria met.  Treatment indicated.   Functional Assessment Scale (FAS) 5   Treatment Diagnosis mild oropharyngeal dysphagia   Diet texture recommendations Dysphagia diet level 2;Thin liquids   Recommended Feeding/Eating Techniques maintain upright posture during/after eating for 30 mins;small sips/bites;alternate between small bites and sips of food/liquid   Demonstrates Need for Referral to Another Service occupational therapy;physical therapy;dietitian   Therapy Frequency 5 times/wk   Predicted Duration of Therapy Intervention (days/wks) 2 weeks   Anticipated Discharge Disposition inpatient rehabilitation facility   Risks and Benefits of Treatment have been explained. Yes   Patient, family and/or staff in agreement with  Plan of Care Yes   Clinical Impression Comments Clinical dysphagia examination completed per MD order. The patient presents with mild oropharyngeal dysphagia marked by generalized deconditioning, but overall coordination appears intact. She has a baseline cough, present in and out of the context of swallows, no change in cough with modified PO presentations. Recommend the patient initiate dysphagia diet 2 and thin liquids with supervision and assistance for safety precautions. SLP to follow for diet tolerance and readiness to safely advance diet level.   Total Evaluation Time   Total Evaluation Time (Minutes) 18

## 2017-12-25 NOTE — PLAN OF CARE
Problem: Restraint for Non-Violent/Non-Self-Destructive Behavior  Goal: Prevent/Manage Potential Problems  Maintain safety of patient and others during period of restraint.  Promote psychological and physical wellbeing.  Prevent injury to skin and involved body parts.  Promote nutrition, hydration, and elimination.   Pt waking up from sedation in preparation for extubation, Continued movements of bringing hands up to ETT and pulling at equipment. MDs notified and observed. Order obtained. Pt remained free from injury with restraints. Soft wrist restraints d/c'd with extubation.
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
## 2017-12-26 ENCOUNTER — APPOINTMENT (OUTPATIENT)
Dept: SPEECH THERAPY | Facility: CLINIC | Age: 47
DRG: 917 | End: 2017-12-26
Attending: SURGERY
Payer: COMMERCIAL

## 2017-12-26 ENCOUNTER — APPOINTMENT (OUTPATIENT)
Dept: ULTRASOUND IMAGING | Facility: CLINIC | Age: 47
DRG: 917 | End: 2017-12-26
Attending: PHYSICIAN ASSISTANT
Payer: COMMERCIAL

## 2017-12-26 ENCOUNTER — APPOINTMENT (OUTPATIENT)
Dept: OCCUPATIONAL THERAPY | Facility: CLINIC | Age: 47
DRG: 917 | End: 2017-12-26
Attending: SURGERY
Payer: COMMERCIAL

## 2017-12-26 ENCOUNTER — APPOINTMENT (OUTPATIENT)
Dept: GENERAL RADIOLOGY | Facility: CLINIC | Age: 47
DRG: 917 | End: 2017-12-26
Attending: PHYSICIAN ASSISTANT
Payer: COMMERCIAL

## 2017-12-26 LAB
ALBUMIN SERPL-MCNC: 2.3 G/DL (ref 3.4–5)
ALP SERPL-CCNC: 966 U/L (ref 40–150)
ALT SERPL W P-5'-P-CCNC: 148 U/L (ref 0–50)
ANION GAP SERPL CALCULATED.3IONS-SCNC: 10 MMOL/L (ref 3–14)
AST SERPL W P-5'-P-CCNC: 194 U/L (ref 0–45)
BACTERIA SPEC CULT: NO GROWTH
BACTERIA SPEC CULT: NO GROWTH
BASE EXCESS BLDV CALC-SCNC: 5 MMOL/L
BILIRUB DIRECT SERPL-MCNC: 3.1 MG/DL (ref 0–0.2)
BILIRUB SERPL-MCNC: 4.1 MG/DL (ref 0.2–1.3)
BUN SERPL-MCNC: 42 MG/DL (ref 7–30)
CALCIUM SERPL-MCNC: 7.8 MG/DL (ref 8.5–10.1)
CHLORIDE SERPL-SCNC: 104 MMOL/L (ref 94–109)
CO2 SERPL-SCNC: 28 MMOL/L (ref 20–32)
CREAT SERPL-MCNC: 0.73 MG/DL (ref 0.52–1.04)
DIFFERENTIAL METHOD BLD: NORMAL
ERYTHROCYTE [DISTWIDTH] IN BLOOD BY AUTOMATED COUNT: 21 % (ref 10–15)
ERYTHROCYTE [DISTWIDTH] IN BLOOD BY AUTOMATED COUNT: NORMAL % (ref 10–15)
GFR SERPL CREATININE-BSD FRML MDRD: 85 ML/MIN/1.7M2
GGT SERPL-CCNC: 2661 U/L (ref 0–40)
GLUCOSE BLDC GLUCOMTR-MCNC: 111 MG/DL (ref 70–99)
GLUCOSE BLDC GLUCOMTR-MCNC: 120 MG/DL (ref 70–99)
GLUCOSE BLDC GLUCOMTR-MCNC: 150 MG/DL (ref 70–99)
GLUCOSE BLDC GLUCOMTR-MCNC: 179 MG/DL (ref 70–99)
GLUCOSE BLDC GLUCOMTR-MCNC: 185 MG/DL (ref 70–99)
GLUCOSE SERPL-MCNC: 127 MG/DL (ref 70–99)
HCO3 BLDV-SCNC: 30 MMOL/L (ref 21–28)
HCT VFR BLD AUTO: 27.5 % (ref 35–47)
HCT VFR BLD AUTO: NORMAL % (ref 35–47)
HGB BLD-MCNC: 8.9 G/DL (ref 11.7–15.7)
HGB BLD-MCNC: NORMAL G/DL (ref 11.7–15.7)
LACTATE BLD-SCNC: 1.8 MMOL/L (ref 0.7–2)
LIPASE SERPL-CCNC: 1775 U/L (ref 73–393)
MAGNESIUM SERPL-MCNC: 2.1 MG/DL (ref 1.6–2.3)
MCH RBC QN AUTO: 31.2 PG (ref 26.5–33)
MCH RBC QN AUTO: NORMAL PG (ref 26.5–33)
MCHC RBC AUTO-ENTMCNC: 32.4 G/DL (ref 31.5–36.5)
MCHC RBC AUTO-ENTMCNC: NORMAL G/DL (ref 31.5–36.5)
MCV RBC AUTO: 97 FL (ref 78–100)
MCV RBC AUTO: NORMAL FL (ref 78–100)
O2/TOTAL GAS SETTING VFR VENT: 40 %
PCO2 BLDV: 44 MM HG (ref 40–50)
PH BLDV: 7.44 PH (ref 7.32–7.43)
PHOSPHATE SERPL-MCNC: 3.4 MG/DL (ref 2.5–4.5)
PLATELET # BLD AUTO: 299 10E9/L (ref 150–450)
PLATELET # BLD AUTO: NORMAL 10E9/L (ref 150–450)
PO2 BLDV: 37 MM HG (ref 25–47)
POTASSIUM SERPL-SCNC: 3.4 MMOL/L (ref 3.4–5.3)
PROT SERPL-MCNC: 5.2 G/DL (ref 6.8–8.8)
RBC # BLD AUTO: 2.85 10E12/L (ref 3.8–5.2)
RBC # BLD AUTO: NORMAL 10E12/L (ref 3.8–5.2)
SODIUM SERPL-SCNC: 142 MMOL/L (ref 133–144)
SPECIMEN SOURCE: NORMAL
SPECIMEN SOURCE: NORMAL
T4 FREE SERPL-MCNC: 1.09 NG/DL (ref 0.76–1.46)
WBC # BLD AUTO: 20.6 10E9/L (ref 4–11)
WBC # BLD AUTO: NORMAL 10E9/L (ref 4–11)

## 2017-12-26 PROCEDURE — 99207 ZZC APP CREDIT; MD BILLING SHARED VISIT: CPT | Performed by: PHYSICIAN ASSISTANT

## 2017-12-26 PROCEDURE — 40000983 ZZH STATISTIC HFNC ADULT NON-CPAP

## 2017-12-26 PROCEDURE — 85027 COMPLETE CBC AUTOMATED: CPT | Performed by: INTERNAL MEDICINE

## 2017-12-26 PROCEDURE — 25000132 ZZH RX MED GY IP 250 OP 250 PS 637: Performed by: PHYSICIAN ASSISTANT

## 2017-12-26 PROCEDURE — 82803 BLOOD GASES ANY COMBINATION: CPT | Performed by: INTERNAL MEDICINE

## 2017-12-26 PROCEDURE — 25000128 H RX IP 250 OP 636: Performed by: INTERNAL MEDICINE

## 2017-12-26 PROCEDURE — 25000132 ZZH RX MED GY IP 250 OP 250 PS 637: Performed by: INTERNAL MEDICINE

## 2017-12-26 PROCEDURE — 85025 COMPLETE CBC W/AUTO DIFF WBC: CPT | Performed by: INTERNAL MEDICINE

## 2017-12-26 PROCEDURE — 97530 THERAPEUTIC ACTIVITIES: CPT | Mod: GO | Performed by: OCCUPATIONAL THERAPIST

## 2017-12-26 PROCEDURE — 25000132 ZZH RX MED GY IP 250 OP 250 PS 637

## 2017-12-26 PROCEDURE — 82977 ASSAY OF GGT: CPT | Performed by: INTERNAL MEDICINE

## 2017-12-26 PROCEDURE — 40000225 ZZH STATISTIC SLP WARD VISIT

## 2017-12-26 PROCEDURE — 83690 ASSAY OF LIPASE: CPT | Performed by: INTERNAL MEDICINE

## 2017-12-26 PROCEDURE — 25000125 ZZHC RX 250: Performed by: INTERNAL MEDICINE

## 2017-12-26 PROCEDURE — 40000275 ZZH STATISTIC RCP TIME EA 10 MIN

## 2017-12-26 PROCEDURE — 83605 ASSAY OF LACTIC ACID: CPT | Performed by: INTERNAL MEDICINE

## 2017-12-26 PROCEDURE — 25000132 ZZH RX MED GY IP 250 OP 250 PS 637: Performed by: STUDENT IN AN ORGANIZED HEALTH CARE EDUCATION/TRAINING PROGRAM

## 2017-12-26 PROCEDURE — 00000146 ZZHCL STATISTIC GLUCOSE BY METER IP

## 2017-12-26 PROCEDURE — 99211 OFF/OP EST MAY X REQ PHY/QHP: CPT

## 2017-12-26 PROCEDURE — 80076 HEPATIC FUNCTION PANEL: CPT | Performed by: INTERNAL MEDICINE

## 2017-12-26 PROCEDURE — 25000128 H RX IP 250 OP 636: Performed by: STUDENT IN AN ORGANIZED HEALTH CARE EDUCATION/TRAINING PROGRAM

## 2017-12-26 PROCEDURE — 76705 ECHO EXAM OF ABDOMEN: CPT | Mod: XS

## 2017-12-26 PROCEDURE — 94640 AIRWAY INHALATION TREATMENT: CPT

## 2017-12-26 PROCEDURE — 97167 OT EVAL HIGH COMPLEX 60 MIN: CPT | Mod: GO | Performed by: OCCUPATIONAL THERAPIST

## 2017-12-26 PROCEDURE — 71010 XR CHEST PORT 1 VW: CPT

## 2017-12-26 PROCEDURE — 83735 ASSAY OF MAGNESIUM: CPT | Performed by: INTERNAL MEDICINE

## 2017-12-26 PROCEDURE — 40000047 ZZH STATISTIC CTO2 CONT OXYGEN TECH TIME EA 90 MIN

## 2017-12-26 PROCEDURE — 84439 ASSAY OF FREE THYROXINE: CPT | Performed by: INTERNAL MEDICINE

## 2017-12-26 PROCEDURE — 92526 ORAL FUNCTION THERAPY: CPT | Mod: GN

## 2017-12-26 PROCEDURE — 97110 THERAPEUTIC EXERCISES: CPT | Mod: GO | Performed by: OCCUPATIONAL THERAPIST

## 2017-12-26 PROCEDURE — 80048 BASIC METABOLIC PNL TOTAL CA: CPT | Performed by: INTERNAL MEDICINE

## 2017-12-26 PROCEDURE — 99233 SBSQ HOSP IP/OBS HIGH 50: CPT | Performed by: INTERNAL MEDICINE

## 2017-12-26 PROCEDURE — 94640 AIRWAY INHALATION TREATMENT: CPT | Mod: 76

## 2017-12-26 PROCEDURE — 84100 ASSAY OF PHOSPHORUS: CPT | Performed by: INTERNAL MEDICINE

## 2017-12-26 PROCEDURE — 40000133 ZZH STATISTIC OT WARD VISIT: Performed by: OCCUPATIONAL THERAPIST

## 2017-12-26 PROCEDURE — 12000001 ZZH R&B MED SURG/OB UMMC

## 2017-12-26 PROCEDURE — 97535 SELF CARE MNGMENT TRAINING: CPT | Mod: GO | Performed by: OCCUPATIONAL THERAPIST

## 2017-12-26 RX ORDER — OXYCODONE HYDROCHLORIDE 5 MG/1
5 TABLET ORAL EVERY 6 HOURS PRN
Status: COMPLETED | OUTPATIENT
Start: 2017-12-26 | End: 2017-12-27

## 2017-12-26 RX ORDER — FOLIC ACID 1 MG/1
1 TABLET ORAL DAILY
Status: DISCONTINUED | OUTPATIENT
Start: 2017-12-26 | End: 2018-01-05 | Stop reason: HOSPADM

## 2017-12-26 RX ORDER — PANTOPRAZOLE SODIUM 40 MG/1
40 TABLET, DELAYED RELEASE ORAL EVERY MORNING
Status: DISCONTINUED | OUTPATIENT
Start: 2017-12-27 | End: 2018-01-05 | Stop reason: HOSPADM

## 2017-12-26 RX ORDER — LABETALOL HYDROCHLORIDE 5 MG/ML
20 INJECTION, SOLUTION INTRAVENOUS EVERY 6 HOURS PRN
Status: DISCONTINUED | OUTPATIENT
Start: 2017-12-26 | End: 2018-01-05 | Stop reason: HOSPADM

## 2017-12-26 RX ADMIN — Medication 125 MG: at 12:36

## 2017-12-26 RX ADMIN — Medication 125 MG: at 15:28

## 2017-12-26 RX ADMIN — Medication 3 MG: at 15:28

## 2017-12-26 RX ADMIN — IPRATROPIUM BROMIDE AND ALBUTEROL SULFATE 3 ML: .5; 3 SOLUTION RESPIRATORY (INHALATION) at 16:26

## 2017-12-26 RX ADMIN — Medication 0.1 MG: at 15:27

## 2017-12-26 RX ADMIN — POTASSIUM CHLORIDE 20 MEQ: 750 TABLET, EXTENDED RELEASE ORAL at 16:22

## 2017-12-26 RX ADMIN — PREDNISONE 20 MG: 20 TABLET ORAL at 08:44

## 2017-12-26 RX ADMIN — Medication 0.1 MG: at 08:52

## 2017-12-26 RX ADMIN — ONDANSETRON HYDROCHLORIDE 4 MG: 4 SOLUTION ORAL at 19:48

## 2017-12-26 RX ADMIN — Medication 100 MG: at 08:44

## 2017-12-26 RX ADMIN — Medication 125 MG: at 08:52

## 2017-12-26 RX ADMIN — IPRATROPIUM BROMIDE AND ALBUTEROL SULFATE 3 ML: .5; 3 SOLUTION RESPIRATORY (INHALATION) at 19:52

## 2017-12-26 RX ADMIN — LORAZEPAM 2 MG: 1 TABLET ORAL at 16:59

## 2017-12-26 RX ADMIN — PANTOPRAZOLE SODIUM 40 MG: 40 INJECTION, POWDER, FOR SOLUTION INTRAVENOUS at 08:44

## 2017-12-26 RX ADMIN — ENOXAPARIN SODIUM 40 MG: 40 INJECTION SUBCUTANEOUS at 10:46

## 2017-12-26 RX ADMIN — FOLIC ACID 1 MG: 1 TABLET ORAL at 16:22

## 2017-12-26 RX ADMIN — CARVEDILOL 6.25 MG: 25 TABLET, FILM COATED ORAL at 20:33

## 2017-12-26 RX ADMIN — IBUPROFEN 600 MG: 200 SUSPENSION ORAL at 04:15

## 2017-12-26 RX ADMIN — LORAZEPAM 2 MG: 1 TABLET ORAL at 22:46

## 2017-12-26 RX ADMIN — Medication 125 MG: at 20:33

## 2017-12-26 RX ADMIN — OLANZAPINE 5 MG: 5 TABLET, ORALLY DISINTEGRATING ORAL at 08:44

## 2017-12-26 RX ADMIN — ONDANSETRON HYDROCHLORIDE 4 MG: 4 SOLUTION ORAL at 06:27

## 2017-12-26 RX ADMIN — OLANZAPINE 5 MG: 5 TABLET, ORALLY DISINTEGRATING ORAL at 20:33

## 2017-12-26 RX ADMIN — Medication 3 MG: at 08:50

## 2017-12-26 RX ADMIN — LORAZEPAM 1 MG: 1 TABLET ORAL at 04:15

## 2017-12-26 RX ADMIN — Medication 0.1 MG: at 20:33

## 2017-12-26 RX ADMIN — IPRATROPIUM BROMIDE AND ALBUTEROL SULFATE 3 ML: .5; 3 SOLUTION RESPIRATORY (INHALATION) at 12:05

## 2017-12-26 RX ADMIN — IBUPROFEN 600 MG: 200 SUSPENSION ORAL at 23:28

## 2017-12-26 RX ADMIN — OXYCODONE HYDROCHLORIDE 5 MG: 5 TABLET ORAL at 18:26

## 2017-12-26 RX ADMIN — POTASSIUM CHLORIDE 20 MEQ: 400 INJECTION, SOLUTION INTRAVENOUS at 06:25

## 2017-12-26 RX ADMIN — IPRATROPIUM BROMIDE AND ALBUTEROL SULFATE 3 ML: .5; 3 SOLUTION RESPIRATORY (INHALATION) at 08:27

## 2017-12-26 RX ADMIN — MULTIVITAMIN 15 ML: LIQUID ORAL at 08:50

## 2017-12-26 RX ADMIN — MIRTAZAPINE 15 MG: 15 TABLET, FILM COATED ORAL at 22:19

## 2017-12-26 RX ADMIN — CARVEDILOL 6.25 MG: 25 TABLET, FILM COATED ORAL at 08:50

## 2017-12-26 RX ADMIN — OXYCODONE HYDROCHLORIDE 10 MG: 5 TABLET ORAL at 02:43

## 2017-12-26 RX ADMIN — LEVOTHYROXINE SODIUM 50 MCG: 25 TABLET ORAL at 08:44

## 2017-12-26 RX ADMIN — Medication 2.5 MG: at 00:45

## 2017-12-26 ASSESSMENT — ACTIVITIES OF DAILY LIVING (ADL): IADL_COMMENTS: OT: PT WAS IND

## 2017-12-26 ASSESSMENT — PAIN DESCRIPTION - DESCRIPTORS: DESCRIPTORS: CRAMPING

## 2017-12-26 NOTE — PROGRESS NOTES
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 ICU course c/b lactic acidosis, acute hypoxic/hypercarbic respiratory failure 2/2 aspiration requiring intubation c/b ARDS, ARF, severe hypotension/vasogenic shock requiring pressors, paroxysmal A.fib, intermittent SVT, and sepsis 2/2 sinusitis. Patient extubated 12/24/17 and transferred to medicine 12/25 for ongoing care.     Intentional overdose 12/13 with bipolar disorder, depression, anxiety, etoh abuse, and possible benzodiazapine w/d: Overdose with amlodipine, Tylenol, Nyquil and Etoh. PTA drinking 16 oz vodka daily for 1.5 days. Treated with Ca gtt, high dose insulin, and NAC per poison control. Extubated 12/24, see below. Psych consulted, last seen 12/23, suggested sitter until discharge, likely to inpatient psychiatry vs TCU.  - Repeat psychiatry consult tomorrow  - Start oxycodone q6h prn to prevent withdrawal given chronic use over the past several days  - SLP following, DD2 with thin liquids   - Sitter, suicide precautions   - Continue scheduled ativan, Remeron, olanzapine    - Daily thiamine, folic acid, MVI    Acute pancreatitis: Lipase 1775 12/26. Previously normal. CT AP 12/16 negative for pancreatitis. Epigastric pain without nausea. US no stones. BISAP 3, although impaired MS likely 2/2 OD as above, BUN elevation chronic, and pleural effusion 2/2 ARDS  - Lactic acid, will likely attempt to treat through lipase if normal or would give IVF fluids if elevated     ARDS 2/2 aspiration, acute hypoxic/hypercarbic respiratory failure: Initially d/t overdose, V/Q  mismatch in setting of ARDS. S/p intubation (initially prone) flolan, solumedrol, diuretics, and Duobneb. Extubated . Currently on 40% HFNC with stable sats. CXR  waxing and waning bilateral diffuse pulmonary opacities. Possibility of alveolar hemorrhage given elevated BUN, Tbili, and acte anemia. Treated with Solumedrol, transitioned to oral prednisone x3 days . VB.51/41/35/33.  - Repeat VBG, CXR  - Acapella   - Supplemental O2, prn Duonebs   - Continue prednisone 20 mg daily through  with possibility of extending as needed    Transaminitis: LFTs labile in the setting of APAP overdose, etoh abuse. , , Tbili 4.1 and stable to improving, but  (852, normal on admission). Afebrile. Diffuse abdominal tenderness. Per ICU, paralyzed for about 7 days, which would be contriubting to ALP elevation, although cannot rule out stone  - RUQ US, GGT today     Recurrent C. Diff: H/o chronic infection s/p failed fecal transplant (2016). C.diff +. Currently on PO Vancomycin with BM stabilizing. WBC 20.6 (23.4). VSS.   - Continue PO vancomycin QID through 18. Will likely need pulse taper     HTN: PTA on Norvasc. Currently on Hydralazine, clonidine and Coreg. BP stable.   - Continue Hydralazine, clonidine and Coreg with hold parameters   - Labetalol prn for SBP>175 or DBP>110    Coagulopathy: In the setting of etoh use, poor nutrition. S/p vitamin K. INR 1.53 (1.73). No s/s of acute bleeding    Pressure injury: Not present on admission. Bilateral cheek and left forearm  - WOCN following      Normocytic anemia: Hgb 10.4 on admit. Drop to 5.8 , /pp 1U RBC. Peripheral smear with marked normochromic, normocytic anemia, very rare RBC fragments and spherocytes- leukoerythroblastic picture, slight leukocytosis, neutrophilia with left shift, moderate thrombocytopenia. Haptoglobin 63, . Concern for possible alveolar hemorrhage as above. Possible 2/2 chronic Etoh use. Hgb now  stable  - Continue Folic acid     Hypothyroidism: TSH 5.06. PTA synthroid  - Continue PTA synthroid  - Add on free T4  - Recheck in 6 weeks       Resolved Hospital Problems and Stable, Chronic Medical Conditions:  Hypernatremia: Consistently elevated from admission to 12/25, normalized 12/26. Thought 2/2 hypovolemia.   ARF: 2/2 CCB overdose. Cr peak 1.62. C/b volume overload and pulmonary edema, treated with IV lasix.  Paroxsymal a fib  SVT: Presented with HR 190s, resolved with IVF  Paroxysmal a fib: Brief episode of a fib 12/20 with conversion to NSR with metoprolol x2. No further episodes. On Coreg as above       Diet: Dysphagia Diet Level 2 Mech Altered Thin Liquids (water, ice chips, juice, milk gelatin, ice cream, etc)  Fluids: TKO  DVT Prophylaxis: Enoxaparin (Lovenox) SQ  Code Status: Full Code    Disposition Plan   Expected discharge: 4 - 7 days, recommended to TCU vs inpatient psychiatry once physically stronger, medically stable, disposition plan determined.     Entered: Nehal Munson 12/26/2017, 8:44 AM   Information in the above section will display in the discharge planner report.      The patient's care was discussed with the patient, nursing, care coordination, and attending physician, Dr. Triston Munson  Internal Medicine Staff Hospitalist Service  [facility]  Pager: [phone]  Please see sticky note for cross cover information    Interval History   Quite tired this morning. Has not been able to get up and sit on the edge of the bed. Burning epigastric pain. Dyspnea with deep inhalation and minimal movement. Discomfort with incontinence but no urinary symptoms. Denies confusion. Appetite minimal.     Data reviewed today: I reviewed all medications, new labs and imaging results over the last 24 hours. I personally reviewed pertinent notes and imaging     Physical Exam   Vital Signs: Temp: 98.8  F (37.1  C) Temp src: Axillary BP: 143/85   Heart Rate: 84  Resp: 23 SpO2: 97 % O2 Device: Aerosol mask    Weight: 122 lbs 9.21 oz  General Appearance: Alert and oriented x3, appears exhausted. Diaphoretic  Respiratory: Non-productive cough with deep inhalation. CATB without wheezing, crackles, or rales  Cardiovascular: RRR without murmur. S1, S2  GI: Abdomen soft, diffusely tender with active bowel sounds. No guarding or rebound   Skin: No rash or jaundice noted. LUE with bandage covering part of forearm  Other: 1+ BLE peripheral edema, distal pulses palpable. Moves all extremities         Data   Data     Recent Labs  Lab 12/26/17  0520 12/26/17  0452 12/25/17  1758 12/25/17  0433  12/22/17  0324   WBC 20.6* Unsatisfactory specimen - clotted  --  23.4*  < > 30.6*   HGB 8.9* Unsatisfactory specimen - clotted  --  8.7*  < > 7.9*   MCV 97 Unsatisfactory specimen - clotted  --  95  < > 96    Unsatisfactory specimen - clotted  --  249  < > 98*   INR  --   --   --   --   --  1.53*     --  140 150*  < > 149*   POTASSIUM 3.4  --  3.5 3.6  < > 3.3*   CHLORIDE 104  --  103 112*  < > 102   CO2 28  --  28 30  < > 39*   BUN 42*  --  54* 64*  < > 79*   CR 0.73  --  0.78 0.73  < > 1.18*   ANIONGAP 10  --  10 8  < > 8   ISAURO 7.8*  --  7.4* 7.6*  < > 8.2*   *  --  197* 94  < > 175*   ALBUMIN 2.3*  --   --  2.5*  < > 2.9*   PROTTOTAL 5.2*  --   --  5.2*  < > 5.2*   BILITOTAL 4.1*  --   --  4.8*  < > 6.0*   ALKPHOS 966*  --   --  852*  < > 591*   *  --   --  138*  < > 92*   *  --   --  181*  < > 241*   LIPASE 1775*  --   --   --   --   --    < > = values in this interval not displayed.  Recent Results (from the past 24 hour(s))   US Abddomen Limited w Abd/Pelvis Duplex Complete    Narrative    EXAMINATION: US ABDOMEN LIMITED WITH DOPPLER COMPLETE, 12/26/2017  11:37 AM     COMPARISON: Ultrasound dating 12/20/2017.    HISTORY: Elevated alkaline phosphatase, concern for obstruction.    FINDINGS:   Fluid: No evidence of ascites or pleural effusions.    Liver: The  liver demonstrates normal echotexture, measuring 19.4 cm in  craniocaudal dimension. There is no focal mass. Main portal vein  measures 9 mm in diameter    Extrahepatic portal vein flow is antegrade, measuring 23 cm/sec.  Right portal vein flow is antegrade, measuring 33 cm/sec.  Left portal vein flow is antegrade, measuring 21 cm/sec.    Flow in the hepatic artery is towards the liver and:  185 cm/sec peak systolic  0.62 resistive index.     The splenic vein is not visualized.  The left, middle, and right  hepatic veins are patent with flow towards the IVC. The IVC is patent  with flow towards the heart.   The aorta is not dilated.    Gallbladder: Layering sludge within the lumen of the gallbladder.  There is no wall thickening, pericholecystic fluid, positive  sonographic Bernstein's sign or evidence for cholelithiasis.    Bile Ducts: Both the intra- and extrahepatic biliary system are of  normal caliber.  The common bile duct measures 2 mm in diameter.    Pancreas: Obscured by overlying bowel gas.     Kidney: The right kidney measures 10.9 cm long. Parenchyma is of  normal thickness. No focal mass or hydronephrosis., no shadowing renal  calculi, cystic lesion or mass.         Impression    IMPRESSION: Gallbladder sludge without evidence of cholelithiasis or  cholecystitis. Otherwise normal right upper quadrant ultrasound with  Doppler assessment.    KIERAN MCDONALD MD

## 2017-12-26 NOTE — PLAN OF CARE
Problem: Patient Care Overview  Goal: Plan of Care/Patient Progress Review  Discharge Planner SLP   Patient plan for discharge: patient didn't state  Current status: The patient is demonstrating good progress from a swallowing standpoint. Recommend diet upgrade to regular textures & thin liquids. SLP will sign off as oropharyngeal swallowing is intact.   Barriers to return to prior living situation: none per SLP  Recommendations for discharge: defer to OT/PT, MD & psych rec  Rationale for recommendations: no ongoing SLP needs       Entered by: Kiarra Kennedy 12/26/2017 1:14 PM     Speech Language Therapy Discharge Summary    Reason for therapy discharge:    All goals and outcomes met, no further needs identified.    Progress towards therapy goal(s). See goals on Care Plan in [facility] electronic health record for goal details.  Goals met    Therapy recommendation(s):    No further therapy is recommended.

## 2017-12-26 NOTE — PROGRESS NOTES
" 12/26/17 0900   Quick Adds   Type of Visit Initial Occupational Therapy Evaluation   Living Environment   Lives With alone   Living Arrangements apartment   Home Accessibility stairs to enter home   Number of Stairs to Enter Home 6   Number of Stairs Within Home 0   Transportation Available none   Self-Care   Dominant Hand right   Usual Activity Tolerance good   Current Activity Tolerance poor   Regular Exercise no   Equipment Currently Used at Home none   Functional Level Prior   Ambulation 0-->independent   Transferring 0-->independent   Toileting 0-->independent   Bathing 0-->independent   Dressing 0-->independent   Eating 0-->independent   Communication 0-->understands/communicates without difficulty   Swallowing 0-->swallows foods/liquids without difficulty   Cognition 0 - no cognition issues reported   Fall history within last six months no   Which of the above functional risks had a recent onset or change? none   General Information   Onset of Illness/Injury or Date of Surgery - Date 12/13/17   Referring Physician Raymon Pedro MD   Patient/Family Goals Statement to get stronger   Additional Occupational Profile Info/Pertinent History of Current Problem OT: Per chart pt \"is a 48 yo female with hx of bipolar disorder with depression, polysubstance abuse, & prior suicide attempts, admitted to [facility] MICU  Following calcium channel blocker (CCB) overdose, tylenol overdose & alcohol overdose. Course complicated by lactic acidosis, hypoventilation, acute renal failure, & severe hypotension/vasogenic shock\"   Precautions/Limitations fall precautions   Cognitive Status Examination   Orientation orientation to person, place and time   Cognitive Comment OT: Pt reports trouble w/ memory since admission, OT to address during stay as appropriate   Visual Perception   Visual Perception No deficits were identified   Range of Motion (ROM)   ROM Comment OT: PROM WNL   Strength   Strength Comments OT: BUE shoulder " "anil 1/5 MMT, all other 3/5   Hand Strength   Hand Strength Comments OT: bilateral weakened    Muscle Tone Assessment   Muscle Tone Comments OT: BUE overall deconditioned   Mobility   Bed Mobility Comments OT: mod A x1-2   Transfer Skills   Transfer Comments OT: mod A x1-2   Balance   Balance Comments OT: mod A x1-2   Grooming   Level of Stone: Grooming maximum assist (25% patients effort)   Instrumental Activities of Daily Living (IADL)   IADL Comments OT: Pt was ind   Activities of Daily Living Analysis   Impairments Contributing to Impaired Activities of Daily Living balance impaired;cognition impaired;pain;strength decreased   General Therapy Interventions   Planned Therapy Interventions ADL retraining;IADL retraining;balance training;bed mobility training;cognition;strengthening;transfer training;home program guidelines;progressive activity/exercise   Clinical Impression   Criteria for Skilled Therapeutic Interventions Met yes, treatment indicated   OT Diagnosis decreased ind in ADLS/IADLS   Influenced by the following impairments generalized weakness and fatiuge   Assessment of Occupational Performance 5 or more Performance Deficits   Identified Performance Deficits decreased ind in ADLS/IADLS   Clinical Decision Making (Complexity) High complexity   Therapy Frequency daily   Predicted Duration of Therapy Intervention (days/wks) 1/20/18   Anticipated Discharge Disposition Acute Rehabilitation Facility   Risks and Benefits of Treatment have been explained. Yes   Patient, Family & other staff in agreement with plan of care Yes   [employer] [facility] TM \"6 Clicks\"   2016, Trustees of [employer], under license to [employer].  All rights reserved.   6 Clicks Short Forms Daily Activity Inpatient Short Form   [facility]  \"6 Clicks\" Daily Activity Inpatient Short Form   1. Putting on and taking off regular lower body clothing? 1 - Total   2. Bathing (including washing, rinsing, " drying)? 1 - Total   3. Toileting, which includes using toilet, bedpan or urinal? 1 - Total   4. Putting on and taking off regular upper body clothing? 1 - Total   5. Taking care of personal grooming such as brushing teeth? 1 - Total   6. Eating meals? 1 - Total   Daily Activity Raw Score (Score out of 24.Lower scores equate to lower levels of function) 6   Total Evaluation Time   Total Evaluation Time (Minutes) 5

## 2017-12-26 NOTE — PROGRESS NOTES
[facility] Nurse Inpatient Adult Pressure InjuryAssessment    Followup Assessment  Reason for consultation: Evaluate and treat multiple pressure injuries    Assessment:   Left cheek wound due to Pressure Injury  Pressure Injury appears to be a Stage 2 with dried drainage over the lateral aspect of the wound. This may continue to evolve and will be monitored closely. Present on admission No  This is a Medical Device Related Pressure Injury (MDRPI) due to ETT securement device    Right cheek wound due to Pressure Injury  Pressure Injury is Stage 2.  Present on admission No  This is a Medical Device Related Pressure Injury (MDRPI) due to ETT securement device    Left proximal forearm wound due to Pressure Injury  Pressure Injury is Stage Suspected Deep Tissue  Present on admission No  This is a Medical Device Related Pressure Injury (MDRPI) due to IV hub    Left distal forearm wound due to Pressure Injury  Pressure Injury is Stage 2. Present on admission No  This is a Medical Device Related Pressure Injury (MDRPI) due to IV cap    Contributing factor of the pressure injury: immobility, proning (no longer being proned)  Status: follow up assessment, evolving    Photo: see individual wound assessments below    TREATMENT  PLAN    Bilateral cheek wounds: Wash twice daily with saline and gauze. Gently rub Vaseline onto wounds to keep moist. If patient is to be proned in the future, please pad cheeks with Mepilex and reposition head every hour.    Left forearm (at AC): wash every other day with wound cleanser and gauze. Cover with Mepilex.    Orders Reviewed  [facility] Nurse follow-up plan:twice weekly  Nursing to notify the Provider(s) and re-consult the [facility] Nurse if wound(s) deteriorates or new skin concern.    Patient History  According to provider note(s):    Ana Laura Wharton is a 48 yo female with hx of bipolar disorder with depression, polysubstance abuse, & prior suicide attempts, admitted to [facility] MICU  Following calcium channel  blocker (CCB) overdose, tylenol overdose & alcohol overdose. Course complicated by lactic acidosis, hypoventilation, acute renal failure, & severe hypotension/vasogenic shock. Currently intubated, sedated, & proned.      Patient proned for lung recruitment last from 17 at 1315 to 17 at 0520 (per nursing notes). On 17 ET tabs changed and pressure injury noted under old tabs. WOC consult placed. Left forearm pressure injury noted under IV during WOC skin assessment.    Objective Data   Containment of urine/stool: Internal fecal management and Indwelling catheter    Current Diet/ Nutrition:    Active Diet Order      Dysphagia Diet Level 2 [facility] Altered Thin Liquids (water, ice chips, juice, milk gelatin, ice cream, etc)    Output:   I/O last 3 completed shifts:  In: 2148.8 [P.O.:1890; I.V.:258.8]  Out: 2575 [Urine:2475; Stool:100]    Skin Assessment: Sujit Sujit Score  Av.3  Min: 8  Max: 20                                Sujit Q No Data Recorded                     NSRAS No Data Recorded     Labs:   Recent Labs  Lab 17  0520  17  0433  17  0324   HGB 8.9*  < > 8.7*  < > 7.9*   INR  --   --   --   --  1.53*   WBC 20.6*  < > 23.4*  < > 30.6*   CRP  --   --  19.0*  --   --    < > = values in this interval not displayed.    Recent Labs  Lab 17  0225 17  0212 17  0502 17  0501   CULT No growth after 5 days  No growth after 5 days No growth No growth No growth       Physical Exam    Skin assessment:   Focused skin inspection: anterior body    Wound Location:  Left cheek    17 Left cheek    Wound History: see history above.  Measurements (length x width x depth, in cm) 2 cm x 4 cm  x  0 cm   Wound Base: Dry drainage scab, no surrounding erythema  alpation of the wound bed: firm   Periwound skin: intact  Color: normal and consistent with surrounding tissue  Temperature: normal   Drainage: none  Description of drainage: none  Odor: none  Pain:none      Wound Location:  Right cheek    12/21/17 Right cheek    Wound History: see history above  Measurements (length x width x depth, in cm) 0.4 cm x 0.9 cm  x  0 cm   Wound Base:  Dry drainage scab  Palpation of the wound bed: firm   Periwound skin: intact  Color: normal and consistent with surrounding tissue  Temperature: normal   Drainage: none  Description of drainage: none  Odor: none  Pain: none    Wound Location:  Left proximal forearm    12/21/17 left forearm    Wound History: Noted during skin assessment. Wound was found under hub of peripheral IV site  Measurements (length x width x depth, in cm) 2 cm x 1 cm  x  0 cm   Wound Base:  Non-blanchable, dry drainage that is black (appears more like scab than eschar)  Palpation of the wound bed: mild induration  Periwound skin: skin stripping from tape just medial to DTI  Color: normal and consistent with surrounding tissue  Temperature: normal   Drainage: none  Description of drainage: none  Odor: none  Pain:none    Wound Location:  Left distal forearm    12/21/17 left forearm    Wound History: Noted during skin assessment. Wound was found under hub of peripheral IV site  Measurements (length x width x depth, in cm) 0.5 cm x 0.4 cm  x  0.01 cm   Wound Base: resurfaced epidermis  Palpation of the wound bed: normal   Periwound skin: intact  Color: normal and consistent with surrounding tissue  Temperature: normal   Drainage: none  Description of drainage: none  Odor: none  Pain: none    Interventions  Current support surface: Standard  Low air loss    Current off-loading measures: Pillows under calves  Visual inspection of wound(s) completed  Wound Care:was done per plan of care    Cleansing with saline solution  Supplies: Placed at Bedside    Discussed plan of care with Nurse    Face to face time: 10 minutes

## 2017-12-26 NOTE — PLAN OF CARE
Problem: Patient Care Overview  Goal: Plan of Care/Patient Progress Review  Outcome: Improving  D: Pt with suicide attempt via Ca channel blocker, Tylenol, and ETOH overdose  A/I:Pts mentation improving throughout shift. A&O throughout shift with mild confused conversation intermittently. Pt remained appropriate. Sitter in room to assist with cares and emotional support. PRN pain and anxiety meds given. VSS, remained on HF @ 40% through aerosol mask. Tachypneic into the high 30s at times. Occasionally reported episodes of some SOB. Speech evaluated pt and pass for DD2 with thin liquids. Pt tolerating diet well. Minimal output through RT, RT removed and using diapers. Pt being diuresed, good UOP, asks for bed pan. Pt transferred to Gold team. PA down to evaluate patient  P: Continue to monitor pts respiratory status. Treat pain and anxiety. Transfer pt. Alert team to any changes.

## 2017-12-26 NOTE — PLAN OF CARE
Problem: Patient Care Overview  Goal: Plan of Care/Patient Progress Review  Discharge Planner OT   Patient plan for discharge: not stated  Current status: Pt mod A x2 progressing to mod A x1 sit > stand pivot transfer w/ bilateral knee blocking throughout. OT educated pt on anxiety/depression mgmt techniques w/in OT scope of practice including CBT techniques and use of music. VSS throughout.   Barriers to return to prior living situation: deconditioning  Recommendations for discharge: ARU  Rationale for recommendations: to increase ind in ADLS/IADLS       Entered by: Faye Mera 12/26/2017 10:42 AM
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
## 2017-12-27 ENCOUNTER — APPOINTMENT (OUTPATIENT)
Dept: PHYSICAL THERAPY | Facility: CLINIC | Age: 47
DRG: 917 | End: 2017-12-27
Attending: SURGERY
Payer: COMMERCIAL

## 2017-12-27 ENCOUNTER — APPOINTMENT (OUTPATIENT)
Dept: GENERAL RADIOLOGY | Facility: CLINIC | Age: 47
DRG: 917 | End: 2017-12-27
Attending: PHYSICIAN ASSISTANT
Payer: COMMERCIAL

## 2017-12-27 ENCOUNTER — APPOINTMENT (OUTPATIENT)
Dept: MRI IMAGING | Facility: CLINIC | Age: 47
DRG: 917 | End: 2017-12-27
Attending: PHYSICIAN ASSISTANT
Payer: COMMERCIAL

## 2017-12-27 LAB
ALBUMIN SERPL-MCNC: 2.5 G/DL (ref 3.4–5)
ALP SERPL-CCNC: 1143 U/L (ref 40–150)
ALT SERPL W P-5'-P-CCNC: 166 U/L (ref 0–50)
ANION GAP SERPL CALCULATED.3IONS-SCNC: 8 MMOL/L (ref 3–14)
AST SERPL W P-5'-P-CCNC: 190 U/L (ref 0–45)
BACTERIA SPEC CULT: NO GROWTH
BACTERIA SPEC CULT: NO GROWTH
BASOPHILS # BLD AUTO: 0.1 10E9/L (ref 0–0.2)
BASOPHILS NFR BLD AUTO: 0.2 %
BILIRUB SERPL-MCNC: 4.1 MG/DL (ref 0.2–1.3)
BUN SERPL-MCNC: 26 MG/DL (ref 7–30)
CALCIUM SERPL-MCNC: 8.5 MG/DL (ref 8.5–10.1)
CHLORIDE SERPL-SCNC: 105 MMOL/L (ref 94–109)
CO2 SERPL-SCNC: 24 MMOL/L (ref 20–32)
CREAT SERPL-MCNC: 0.6 MG/DL (ref 0.52–1.04)
DIFFERENTIAL METHOD BLD: ABNORMAL
EOSINOPHIL # BLD AUTO: 0.6 10E9/L (ref 0–0.7)
EOSINOPHIL NFR BLD AUTO: 2.6 %
ERYTHROCYTE [DISTWIDTH] IN BLOOD BY AUTOMATED COUNT: 21.3 % (ref 10–15)
GFR SERPL CREATININE-BSD FRML MDRD: >90 ML/MIN/1.7M2
GLUCOSE BLDC GLUCOMTR-MCNC: 108 MG/DL (ref 70–99)
GLUCOSE SERPL-MCNC: 219 MG/DL (ref 70–99)
HCT VFR BLD AUTO: 30.9 % (ref 35–47)
HGB BLD-MCNC: 9.6 G/DL (ref 11.7–15.7)
IMM GRANULOCYTES # BLD: 0.8 10E9/L (ref 0–0.4)
IMM GRANULOCYTES NFR BLD: 3.6 %
INR PPP: 1.09 (ref 0.86–1.14)
LACTATE BLD-SCNC: 1.8 MMOL/L (ref 0.7–2)
LYMPHOCYTES # BLD AUTO: 1.6 10E9/L (ref 0.8–5.3)
LYMPHOCYTES NFR BLD AUTO: 7.2 %
MCH RBC QN AUTO: 31.1 PG (ref 26.5–33)
MCHC RBC AUTO-ENTMCNC: 31.1 G/DL (ref 31.5–36.5)
MCV RBC AUTO: 100 FL (ref 78–100)
MONOCYTES # BLD AUTO: 1.2 10E9/L (ref 0–1.3)
MONOCYTES NFR BLD AUTO: 5.4 %
NEUTROPHILS # BLD AUTO: 18.4 10E9/L (ref 1.6–8.3)
NEUTROPHILS NFR BLD AUTO: 81 %
NRBC # BLD AUTO: 0 10*3/UL
NRBC BLD AUTO-RTO: 0 /100
PHOSPHATE SERPL-MCNC: 2.2 MG/DL (ref 2.5–4.5)
PLATELET # BLD AUTO: 363 10E9/L (ref 150–450)
PLATELET # BLD EST: ABNORMAL 10*3/UL
POTASSIUM SERPL-SCNC: 4.6 MMOL/L (ref 3.4–5.3)
PREALB SERPL IA-MCNC: 25 MG/DL (ref 15–45)
PROT SERPL-MCNC: 6.1 G/DL (ref 6.8–8.8)
RBC # BLD AUTO: 3.09 10E12/L (ref 3.8–5.2)
SODIUM SERPL-SCNC: 138 MMOL/L (ref 133–144)
SPECIMEN SOURCE: NORMAL
SPECIMEN SOURCE: NORMAL
WBC # BLD AUTO: 22.8 10E9/L (ref 4–11)

## 2017-12-27 PROCEDURE — 25000132 ZZH RX MED GY IP 250 OP 250 PS 637: Performed by: INTERNAL MEDICINE

## 2017-12-27 PROCEDURE — 71010 XR CHEST PORT 1 VW: CPT

## 2017-12-27 PROCEDURE — 84100 ASSAY OF PHOSPHORUS: CPT | Performed by: INTERNAL MEDICINE

## 2017-12-27 PROCEDURE — 74183 MRI ABD W/O CNTR FLWD CNTR: CPT

## 2017-12-27 PROCEDURE — 80053 COMPREHEN METABOLIC PANEL: CPT | Performed by: INTERNAL MEDICINE

## 2017-12-27 PROCEDURE — 94640 AIRWAY INHALATION TREATMENT: CPT | Mod: 76

## 2017-12-27 PROCEDURE — 25000132 ZZH RX MED GY IP 250 OP 250 PS 637: Performed by: STUDENT IN AN ORGANIZED HEALTH CARE EDUCATION/TRAINING PROGRAM

## 2017-12-27 PROCEDURE — 25000128 H RX IP 250 OP 636: Performed by: STUDENT IN AN ORGANIZED HEALTH CARE EDUCATION/TRAINING PROGRAM

## 2017-12-27 PROCEDURE — 86709 HEPATITIS A IGM ANTIBODY: CPT | Performed by: PHYSICIAN ASSISTANT

## 2017-12-27 PROCEDURE — 25000128 H RX IP 250 OP 636: Performed by: INTERNAL MEDICINE

## 2017-12-27 PROCEDURE — 86704 HEP B CORE ANTIBODY TOTAL: CPT | Performed by: PHYSICIAN ASSISTANT

## 2017-12-27 PROCEDURE — 25000132 ZZH RX MED GY IP 250 OP 250 PS 637: Performed by: PHYSICIAN ASSISTANT

## 2017-12-27 PROCEDURE — 94640 AIRWAY INHALATION TREATMENT: CPT

## 2017-12-27 PROCEDURE — 40000193 ZZH STATISTIC PT WARD VISIT: Performed by: PHYSICAL THERAPIST

## 2017-12-27 PROCEDURE — 85025 COMPLETE CBC W/AUTO DIFF WBC: CPT | Performed by: INTERNAL MEDICINE

## 2017-12-27 PROCEDURE — 83516 IMMUNOASSAY NONANTIBODY: CPT | Performed by: PHYSICIAN ASSISTANT

## 2017-12-27 PROCEDURE — 86038 ANTINUCLEAR ANTIBODIES: CPT | Performed by: PHYSICIAN ASSISTANT

## 2017-12-27 PROCEDURE — 99233 SBSQ HOSP IP/OBS HIGH 50: CPT | Performed by: INTERNAL MEDICINE

## 2017-12-27 PROCEDURE — 99207 ZZC APP CREDIT; MD BILLING SHARED VISIT: CPT | Performed by: PHYSICIAN ASSISTANT

## 2017-12-27 PROCEDURE — 25000125 ZZHC RX 250: Performed by: STUDENT IN AN ORGANIZED HEALTH CARE EDUCATION/TRAINING PROGRAM

## 2017-12-27 PROCEDURE — 97162 PT EVAL MOD COMPLEX 30 MIN: CPT | Mod: GP | Performed by: PHYSICAL THERAPIST

## 2017-12-27 PROCEDURE — 40000275 ZZH STATISTIC RCP TIME EA 10 MIN

## 2017-12-27 PROCEDURE — 25000128 H RX IP 250 OP 636: Performed by: PHYSICIAN ASSISTANT

## 2017-12-27 PROCEDURE — 99232 SBSQ HOSP IP/OBS MODERATE 35: CPT | Performed by: PSYCHIATRY & NEUROLOGY

## 2017-12-27 PROCEDURE — 00000146 ZZHCL STATISTIC GLUCOSE BY METER IP

## 2017-12-27 PROCEDURE — 86708 HEPATITIS A ANTIBODY: CPT | Performed by: PHYSICIAN ASSISTANT

## 2017-12-27 PROCEDURE — A9585 GADOBUTROL INJECTION: HCPCS | Performed by: INTERNAL MEDICINE

## 2017-12-27 PROCEDURE — 25000125 ZZHC RX 250: Performed by: INTERNAL MEDICINE

## 2017-12-27 PROCEDURE — 86706 HEP B SURFACE ANTIBODY: CPT | Performed by: PHYSICIAN ASSISTANT

## 2017-12-27 PROCEDURE — 97530 THERAPEUTIC ACTIVITIES: CPT | Mod: GP | Performed by: PHYSICAL THERAPIST

## 2017-12-27 PROCEDURE — 25000132 ZZH RX MED GY IP 250 OP 250 PS 637

## 2017-12-27 PROCEDURE — 87389 HIV-1 AG W/HIV-1&-2 AB AG IA: CPT | Performed by: PHYSICIAN ASSISTANT

## 2017-12-27 PROCEDURE — 84134 ASSAY OF PREALBUMIN: CPT | Performed by: PHYSICIAN ASSISTANT

## 2017-12-27 PROCEDURE — 86803 HEPATITIS C AB TEST: CPT | Performed by: PHYSICIAN ASSISTANT

## 2017-12-27 PROCEDURE — 83605 ASSAY OF LACTIC ACID: CPT | Performed by: PHYSICIAN ASSISTANT

## 2017-12-27 PROCEDURE — 87340 HEPATITIS B SURFACE AG IA: CPT | Performed by: PHYSICIAN ASSISTANT

## 2017-12-27 PROCEDURE — 85610 PROTHROMBIN TIME: CPT | Performed by: PHYSICIAN ASSISTANT

## 2017-12-27 PROCEDURE — 12000001 ZZH R&B MED SURG/OB UMMC

## 2017-12-27 RX ORDER — SODIUM CHLORIDE, SODIUM LACTATE, POTASSIUM CHLORIDE, CALCIUM CHLORIDE 600; 310; 30; 20 MG/100ML; MG/100ML; MG/100ML; MG/100ML
INJECTION, SOLUTION INTRAVENOUS CONTINUOUS
Status: DISPENSED | OUTPATIENT
Start: 2017-12-27 | End: 2017-12-28

## 2017-12-27 RX ORDER — OXYCODONE HYDROCHLORIDE 5 MG/1
5 TABLET ORAL EVERY 6 HOURS
Status: DISCONTINUED | OUTPATIENT
Start: 2017-12-27 | End: 2017-12-29

## 2017-12-27 RX ORDER — GADOBUTROL 604.72 MG/ML
7.5 INJECTION INTRAVENOUS ONCE
Status: COMPLETED | OUTPATIENT
Start: 2017-12-27 | End: 2017-12-27

## 2017-12-27 RX ADMIN — CARVEDILOL 6.25 MG: 25 TABLET, FILM COATED ORAL at 09:51

## 2017-12-27 RX ADMIN — IPRATROPIUM BROMIDE AND ALBUTEROL SULFATE 3 ML: .5; 3 SOLUTION RESPIRATORY (INHALATION) at 08:23

## 2017-12-27 RX ADMIN — Medication 125 MG: at 16:00

## 2017-12-27 RX ADMIN — SODIUM CHLORIDE, PRESERVATIVE FREE 5 ML: 5 INJECTION INTRAVENOUS at 18:45

## 2017-12-27 RX ADMIN — OLANZAPINE 5 MG: 5 TABLET, ORALLY DISINTEGRATING ORAL at 19:38

## 2017-12-27 RX ADMIN — Medication 0.1 MG: at 19:39

## 2017-12-27 RX ADMIN — ONDANSETRON HYDROCHLORIDE 4 MG: 4 SOLUTION ORAL at 19:01

## 2017-12-27 RX ADMIN — ENOXAPARIN SODIUM 40 MG: 40 INJECTION SUBCUTANEOUS at 13:06

## 2017-12-27 RX ADMIN — LORAZEPAM 2 MG: 1 TABLET ORAL at 08:17

## 2017-12-27 RX ADMIN — Medication 125 MG: at 19:40

## 2017-12-27 RX ADMIN — CARVEDILOL 6.25 MG: 25 TABLET, FILM COATED ORAL at 19:39

## 2017-12-27 RX ADMIN — FOLIC ACID 1 MG: 1 TABLET ORAL at 08:20

## 2017-12-27 RX ADMIN — MIRTAZAPINE 15 MG: 15 TABLET, FILM COATED ORAL at 21:04

## 2017-12-27 RX ADMIN — LORAZEPAM 2 MG: 1 TABLET ORAL at 16:57

## 2017-12-27 RX ADMIN — IPRATROPIUM BROMIDE AND ALBUTEROL SULFATE 3 ML: .5; 3 SOLUTION RESPIRATORY (INHALATION) at 12:32

## 2017-12-27 RX ADMIN — IPRATROPIUM BROMIDE AND ALBUTEROL SULFATE 3 ML: .5; 3 SOLUTION RESPIRATORY (INHALATION) at 21:21

## 2017-12-27 RX ADMIN — LEVOTHYROXINE SODIUM 50 MCG: 25 TABLET ORAL at 08:19

## 2017-12-27 RX ADMIN — POTASSIUM PHOSPHATE, MONOBASIC AND POTASSIUM PHOSPHATE, DIBASIC 15 MMOL: 224; 236 INJECTION, SOLUTION INTRAVENOUS at 13:03

## 2017-12-27 RX ADMIN — Medication 100 MG: at 08:20

## 2017-12-27 RX ADMIN — Medication 0.1 MG: at 09:51

## 2017-12-27 RX ADMIN — Medication 125 MG: at 13:06

## 2017-12-27 RX ADMIN — SODIUM CHLORIDE, PRESERVATIVE FREE 5 ML: 5 INJECTION INTRAVENOUS at 18:44

## 2017-12-27 RX ADMIN — OXYCODONE HYDROCHLORIDE 5 MG: 5 TABLET ORAL at 08:17

## 2017-12-27 RX ADMIN — GADOBUTROL 7 ML: 604.72 INJECTION INTRAVENOUS at 19:40

## 2017-12-27 RX ADMIN — Medication 0.1 MG: at 14:24

## 2017-12-27 RX ADMIN — MULTIVITAMIN 15 ML: LIQUID ORAL at 09:51

## 2017-12-27 RX ADMIN — OLANZAPINE 5 MG: 5 TABLET, ORALLY DISINTEGRATING ORAL at 08:20

## 2017-12-27 RX ADMIN — LORAZEPAM 2 MG: 1 TABLET ORAL at 23:36

## 2017-12-27 RX ADMIN — SODIUM CHLORIDE, POTASSIUM CHLORIDE, SODIUM LACTATE AND CALCIUM CHLORIDE: 600; 310; 30; 20 INJECTION, SOLUTION INTRAVENOUS at 14:15

## 2017-12-27 RX ADMIN — OXYCODONE HYDROCHLORIDE 5 MG: 5 TABLET ORAL at 19:37

## 2017-12-27 RX ADMIN — PANTOPRAZOLE SODIUM 40 MG: 40 TABLET, DELAYED RELEASE ORAL at 08:20

## 2017-12-27 RX ADMIN — Medication 125 MG: at 09:51

## 2017-12-27 RX ADMIN — OXYCODONE HYDROCHLORIDE 5 MG: 5 TABLET ORAL at 14:23

## 2017-12-27 RX ADMIN — PREDNISONE 20 MG: 20 TABLET ORAL at 08:20

## 2017-12-27 ASSESSMENT — PAIN DESCRIPTION - DESCRIPTORS
DESCRIPTORS: ACHING

## 2017-12-27 NOTE — CONSULTS
"This is a follow-up psychiatry consult.    [facility], [place]   Psychiatric Consult Follow-up Note        Interim History:   The patient's chart was reviewed. I spoke with the patient directly.    Consult question: Does sitter need to be continued at this time.    She reports that she is glad to be alive. She indicates that she was doing well before her mood began to spiral quickly. She currently has no suicidal thoughts. She is not interested in CD treatment at this time an her goal is to transition home and continue with outpatient treatment.         Medications:       oxyCODONE IR  5 mg Oral Q6H     pantoprazole  40 mg Oral QAM     folic acid  1 mg Oral Daily     cloNIDine  0.1 mg Oral TID     vitamin  B-1  100 mg Oral Daily     predniSONE  20 mg Oral Daily     insulin aspart  1-12 Units Subcutaneous Q4H     mirtazapine  15 mg Oral At Bedtime     ipratropium - albuterol 0.5 mg/2.5 mg/3 mL  3 mL Nebulization 4x daily     OLANZapine zydis  5 mg Oral BID     carvedilol  6.25 mg Oral or Feeding Tube BID     levothyroxine  50 mcg Oral or Feeding Tube Daily     vancomycin  125 mg Oral 4x Daily     heparin lock flush  5-10 mL Intracatheter Q24H     naloxone  3 mg Oral or Feeding Tube TID     enoxaparin  40 mg Subcutaneous Q24H          Allergies:     Allergies   Allergen Reactions     Amoxicillin-Pot Clavulanate      PN: LW Reaction: Itching, Pruritis     Azithromycin      Other reaction(s): Dermatitis     Cephalexin      PN: LW Reaction: Itching, Pruritis     Ciprofloxacin Other (See Comments)     Joint pain cdiff     Compazine [Prochlorperazine] Other (See Comments)     dystonia     Metoclopramide Other (See Comments)     \"I feel like I am crawling out of my skin\"     Minocycline Nausea and Vomiting     PN: DIARRHEA, VOMITING, AND STOMACH CRAMPS     Penicillins Itching     Reglan [Metoclopramide Hcl] Other (See Comments)     Sensation of \"crawling out of skin\"     Restoril [Temazepam]  "     Thorazine [Chlorpromazine] Other (See Comments)     dystonia     Abilify [Aripiprazole] Rash     Lamotrigine Rash     Severe drug rash - contraindication to receiving again. Skin peeling.      Sulfa Drugs Rash          Labs:     Recent Results (from the past 24 hour(s))   Glucose by meter    Collection Time: 12/26/17  7:59 PM   Result Value Ref Range    Glucose 150 (H) 70 - 99 mg/dL   Glucose by meter    Collection Time: 12/27/17  3:49 AM   Result Value Ref Range    Glucose 108 (H) 70 - 99 mg/dL   CBC with platelets differential    Collection Time: 12/27/17 10:04 AM   Result Value Ref Range    WBC 22.8 (H) 4.0 - 11.0 10e9/L    RBC Count 3.09 (L) 3.8 - 5.2 10e12/L    Hemoglobin 9.6 (L) 11.7 - 15.7 g/dL    Hematocrit 30.9 (L) 35.0 - 47.0 %     78 - 100 fl    MCH 31.1 26.5 - 33.0 pg    MCHC 31.1 (L) 31.5 - 36.5 g/dL    RDW 21.3 (H) 10.0 - 15.0 %    Platelet Count 363 150 - 450 10e9/L    Diff Method Automated Method     % Neutrophils 81.0 %    % Lymphocytes 7.2 %    % Monocytes 5.4 %    % Eosinophils 2.6 %    % Basophils 0.2 %    % Immature Granulocytes 3.6 %    Nucleated RBCs 0 0 /100    Absolute Neutrophil 18.4 (H) 1.6 - 8.3 10e9/L    Absolute Lymphocytes 1.6 0.8 - 5.3 10e9/L    Absolute Monocytes 1.2 0.0 - 1.3 10e9/L    Absolute Eosinophils 0.6 0.0 - 0.7 10e9/L    Absolute Basophils 0.1 0.0 - 0.2 10e9/L    Abs Immature Granulocytes 0.8 (H) 0 - 0.4 10e9/L    Absolute Nucleated RBC 0.0     Platelet Estimate Confirming automated cell count    Comprehensive metabolic panel    Collection Time: 12/27/17 10:04 AM   Result Value Ref Range    Sodium 138 133 - 144 mmol/L    Potassium 4.6 3.4 - 5.3 mmol/L    Chloride 105 94 - 109 mmol/L    Carbon Dioxide 24 20 - 32 mmol/L    Anion Gap 8 3 - 14 mmol/L    Glucose 219 (H) 70 - 99 mg/dL    Urea Nitrogen 26 7 - 30 mg/dL    Creatinine 0.60 0.52 - 1.04 mg/dL    GFR Estimate >90 >60 mL/min/1.7m2    GFR Estimate If Black >90 >60 mL/min/1.7m2    Calcium 8.5 8.5 - 10.1 mg/dL     Bilirubin Total 4.1 (H) 0.2 - 1.3 mg/dL    Albumin 2.5 (L) 3.4 - 5.0 g/dL    Protein Total 6.1 (L) 6.8 - 8.8 g/dL    Alkaline Phosphatase 1143 (H) 40 - 150 U/L     (H) 0 - 50 U/L     (H) 0 - 45 U/L   Phosphorus    Collection Time: 12/27/17 10:04 AM   Result Value Ref Range    Phosphorus 2.2 (L) 2.5 - 4.5 mg/dL   Prealbumin (AM Draw)    Collection Time: 12/27/17 12:54 PM   Result Value Ref Range    Prealbumin 25 15 - 45 mg/dL   Lactic acid whole blood    Collection Time: 12/27/17 12:54 PM   Result Value Ref Range    Lactic Acid 1.8 0.7 - 2.0 mmol/L          Psychiatric Examination:     /85  Temp 99  F (37.2  C) (Oral)  Resp 24  Wt 55.6 kg (122 lb 9.2 oz)  SpO2 94%  BMI 21.71 kg/m2  Weight is 122 lbs 9.21 oz  Body mass index is 21.71 kg/(m^2).  Orthostatic Vitals     None            Appearance: dressed in hospital scrubs and extremely fatigued and unkempt  Attitude:  cooperative  Eye Contact:  good  Mood:  sad   Affect:  intensity is blunted and tired  Speech:  soft and labored due to shortness of breath  Psychomotor Behavior:  no evidence of tardive dyskinesia, dystonia, or tics   Gait: Extremely weak. Required a lot of help to get from bed to chair.  Throught Process:  linear and goal oriented  Associations:  no loose associations  Thought Content:  no evidence of suicidal ideation or homicidal ideation and no evidence of psychotic thought  Insight:  good  Judgement:  fair  Oriented to:  time, person, and place  Attention Span and Concentration:  intact  Recent and Remote Memory:  intact   Fund of Knowledge: Adequate         Assessment and Recommendations:       The patient is a 47-year-old woman with multiple past suicide attempts that was admitted following a suicide attempt. She was intubated and has been treated in the ICU. Now she is more conversant. She continued to be physically quite weak and likely not ready to leave for home at this time. She currently is denying suicidal thinking  and is grateful to be alive.    Recs:  -Continue with previously recommended psychiatric medications  -At this time, she appears to be at low imminent risk here in the hospital and does not appear to need a sitter. Should she appear more depressed or again endorse suicidal thinking I would recommend restarting sitter and consulting psychiatry.  -Please consult psychiatry as she approaches discharge to better determine disposition. It is unclear at this time if she will require inpatient treatment.

## 2017-12-27 NOTE — PLAN OF CARE
Problem: Patient Care Overview  Goal: Plan of Care/Patient Progress Review  PT 4C: Physical therapy evaluation completed, treatment initiated.   Discharge Planner PT   Patient plan for discharge: not stated  Current status: pt very weak, needing min assist for rolling, mod assist for supine to sit, min assist x 2 for pivot to chair.   Barriers to return to prior living situation: pt lives alone, stairs to enter, psych comorbidities  Recommendations for discharge: rehab  Rationale for recommendations: pt with poor mobility, may be appropriate for ARU pending progress. Pending psych recommendations, may need IP psych.       Entered by: Edna Zamudio 12/27/2017 5:08 PM

## 2017-12-27 NOTE — PROGRESS NOTES
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 ICU course c/b lactic acidosis, acute hypoxic/hypercarbic respiratory failure 2/2 aspiration requiring intubation c/b ARDS, ARF, severe hypotension/vasogenic shock requiring pressors, paroxysmal A.fib, intermittent SVT, and sepsis 2/2 sinusitis. Patient extubated 12/24/17, transferred to medicine 12/25 for ongoing care.     Intentional overdose 12/13 with bipolar disorder, depression, anxiety, etoh abuse, possible benzodiazapine w/d: Overdose with amlodipine, Tylenol, Nyquil and Etoh. PTA drinking 16 oz vodka daily for 1.5 days. Treated with Ca gtt, high dose insulin, and NAC per poison control. Extubated 12/24, see below  Psych consulted, last seen 12/23, suggested sitter until discharge, likely to inpatient psychiatry vs TCU.  - Repeat psychiatry consult today   - Sitter, suicide precautions   - Continue scheduled ativan, Remeron, olanzapine    - Daily thiamine, folic acid, MVI    Likely acute opiate withdrawal:  S/s of opiate withdrawal 2/2 high fentanyl dosing during intubation (chills, myalgia, diarrhea, daihproesis, abdominal pain). Although several symptoms can be connected to other etiology (ex. Diarrhea and c diff, abdominal pain and pancreatitis), conglomeration of symptoms likely 2/2 opiate withdrawl  - Start scheduled oxycodone with room to increase if needed    Altered mental status: Waxing and waning mental clarity. A&O x3, however, reports of hallucinations and inappropriate comments. Dyspnea  Likely multifactorial including rapid cycling bipolar with psychotic  features, possible drug-induced psychosis, ongoing infection, and possible hospital acquired delirium  - Repeat CXR as below  - Lactic acid    Acute pancreatitis, transaminitis: Lipase 1775 12/26. Previously normal. CT AP 12/16 negative for pancreatitis. Epigastric pain without nausea. US no stones. Lactic acid normal 12/26. ALP 1143 (966), GGT 2661. ALT, AST, Tbili stable. Diffuse abdominal tenderness.  - MRCP today  - GI consult  - Repeat lactic acid, if trending up (or if sx not improved with withdrawal treatment), will treat with IVF and make NPO    ARDS 2/2 aspiration, acute hypoxic/hypercarbic respiratory failure: Initially d/t overdose, V/Q mismatch in setting of ARDS. S/p intubation (initially prone) flolan, solumedrol, diuretics, and Duobneb. Extubated 12/24. Currently on 40% HFNC with stable sats. CXR 12/25 waxing and waning bilateral diffuse pulmonary opacities. Possibility of alveolar hemorrhage given elevated BUN, Tbili, and acte anemia. Treated with Solumedrol, transitioned to oral prednisone x3 days 12/26. Significant improvement, on room air at rest 12/27  - Repeat CXR  - Acapella, supplemental O2, prn Duonebs   - Continue prednisone 20 mg daily through 12/28 with possibility of extending as needed    Severe malnutrition: Cleared for regular diet, although oral intake has been poor  - Nutrition following  - Start calorie counts  - Prealbumin  - May need to consider TF    Recurrent C. Diff: H/o chronic infection s/p failed fecal transplant (8/2016). C.diff +12/22. Currently on PO Vancomycin with BM stabilizing. WBC 22.8 (20.6). VSS.   - Continue PO vancomycin QID through 1/1/18. Will likely need pulse taper     HTN: PTA on Norvasc. Currently on Hydralazine, clonidine and Coreg. BP stable.   - Continue Hydralazine, clonidine and Coreg with hold parameters   - Labetalol prn for SBP>175 or DBP>110    Coagulopathy: In the setting of etoh use, poor nutrition. S/p vitamin K. INR 1.53 12/27. No s/s of acute  bleeding    Pressure injury: Not present on admission. Bilateral cheek and left forearm  - WOCN following      Normocytic anemia: Hgb 10.4 on admit. Drop to 5.8 12/19, /pp 1U RBC. Peripheral smear with marked normochromic, normocytic anemia, very rare RBC fragments and spherocytes- leukoerythroblastic picture, slight leukocytosis, neutrophilia with left shift, moderate thrombocytopenia. Haptoglobin 63, . Concern for possible alveolar hemorrhage as above. Possible 2/2 chronic Etoh use. Hgb now stable  - Continue Folic acid     Hypothyroidism: TSH 5.06, T4 normal. PTA synthroid  - Continue PTA synthroid  - Recheck in 6 weeks       Resolved Hospital Problems and Stable, Chronic Medical Conditions:  Hypernatremia: Consistently elevated from admission to 12/25, normalized 12/26. Thought 2/2 hypovolemia.   ARF: 2/2 CCB overdose. Cr peak 1.62. C/b volume overload and pulmonary edema, treated with IV lasix.  SVT: Presented with HR 190s, resolved with IVF  Paroxysmal a fib: Brief episode of a fib 12/20 with conversion to NSR with metoprolol x2. No further episodes. On Coreg as above       Diet: Regular Diet Adult  Calorie Counts  Fluids: TKO  DVT Prophylaxis: Enoxaparin (Lovenox) SQ  Code Status: Full Code    Disposition Plan   Expected discharge: 4 - 7 days, recommended to TCU vs inpatient psychiatry once physically stronger, medically stable, disposition plan determined.     Entered: Nehal Munson 12/27/2017, 10:13 AM   Information in the above section will display in the discharge planner report.      The patient's care was discussed with the patient, nursing, care coordination, and attending physician, Dr. Triston Munson  Internal Medicine Staff Hospitalist Service  [facility]  Pager: [phone]  Please see sticky note for cross cover information    Interval History   Ongoing nausea and abdominal pain. Per nursing, limited oral intake over the past few days. Will order  food but not eat it. Denies dyspnea at rest but reports shortness of breath with little movement. No headaches or visual changes. Would like to go home asap. Per nursing, reports of hallucinations including confusing a scab for a bug, strange comments about staff.     Data reviewed today: I reviewed all medications, new labs and imaging results over the last 24 hours. I personally reviewed pertinent notes and imaging     Physical Exam   Vital Signs: Temp: 97.7  F (36.5  C) Temp src: Axillary BP: 156/86   Heart Rate: 98 Resp: (!) 35 SpO2: 94 % O2 Device: Nasal cannula Oxygen Delivery: 2 LPM  Weight: 122 lbs 9.21 oz  General Appearance: Alert and oriented x3. Diaphoretic  Respiratory: CATB without wheezing, crackles, or rales  Cardiovascular: RRR without murmur. S1, S2  GI: Abdomen soft, diffusely tender with more tenderness in the epigastrium, active bowel sounds. No guarding or rebound   Skin: No rash or jaundice noted. LUE with bandage covering part of forearm  Other: Trace BLE peripheral edema, distal pulses palpable. Moves all extremities         Data   Data     Recent Labs  Lab 12/26/17  0520 12/26/17  0452 12/25/17  1758 12/25/17  0433  12/22/17  0324   WBC 20.6* Unsatisfactory specimen - clotted  --  23.4*  < > 30.6*   HGB 8.9* Unsatisfactory specimen - clotted  --  8.7*  < > 7.9*   MCV 97 Unsatisfactory specimen - clotted  --  95  < > 96    Unsatisfactory specimen - clotted  --  249  < > 98*   INR  --   --   --   --   --  1.53*     --  140 150*  < > 149*   POTASSIUM 3.4  --  3.5 3.6  < > 3.3*   CHLORIDE 104  --  103 112*  < > 102   CO2 28  --  28 30  < > 39*   BUN 42*  --  54* 64*  < > 79*   CR 0.73  --  0.78 0.73  < > 1.18*   ANIONGAP 10  --  10 8  < > 8   ISAURO 7.8*  --  7.4* 7.6*  < > 8.2*   *  --  197* 94  < > 175*   ALBUMIN 2.3*  --   --  2.5*  < > 2.9*   PROTTOTAL 5.2*  --   --  5.2*  < > 5.2*   BILITOTAL 4.1*  --   --  4.8*  < > 6.0*   ALKPHOS 966*  --   --  852*  < > 591*   *   --   --  138*  < > 92*   *  --   --  181*  < > 241*   LIPASE 1775*  --   --   --   --   --    < > = values in this interval not displayed.  Recent Results (from the past 24 hour(s))   US Abddomen Limited w Abd/Pelvis Duplex Complete    Narrative    EXAMINATION: US ABDOMEN LIMITED WITH DOPPLER COMPLETE, 12/26/2017  11:37 AM     COMPARISON: Ultrasound dating 12/20/2017.    HISTORY: Elevated alkaline phosphatase, concern for obstruction.    FINDINGS:   Fluid: No evidence of ascites or pleural effusions.    Liver: The liver demonstrates normal echotexture, measuring 19.4 cm in  craniocaudal dimension. There is no focal mass. Main portal vein  measures 9 mm in diameter    Extrahepatic portal vein flow is antegrade, measuring 23 cm/sec.  Right portal vein flow is antegrade, measuring 33 cm/sec.  Left portal vein flow is antegrade, measuring 21 cm/sec.    Flow in the hepatic artery is towards the liver and:  185 cm/sec peak systolic  0.62 resistive index.     The splenic vein is not visualized.  The left, middle, and right  hepatic veins are patent with flow towards the IVC. The IVC is patent  with flow towards the heart.   The aorta is not dilated.    Gallbladder: Layering sludge within the lumen of the gallbladder.  There is no wall thickening, pericholecystic fluid, positive  sonographic Bernstein's sign or evidence for cholelithiasis.    Bile Ducts: Both the intra- and extrahepatic biliary system are of  normal caliber.  The common bile duct measures 2 mm in diameter.    Pancreas: Obscured by overlying bowel gas.     Kidney: The right kidney measures 10.9 cm long. Parenchyma is of  normal thickness. No focal mass or hydronephrosis., no shadowing renal  calculi, cystic lesion or mass.         Impression    IMPRESSION: Gallbladder sludge without evidence of cholelithiasis or  cholecystitis. Otherwise normal right upper quadrant ultrasound with  Doppler assessment.    KIERAN MCDONALD MD   XR Chest Port 1 View     Narrative    Exam: XR CHEST PORT 1 VW, 12/26/2017 12:32 PM    Indication: ARDS, interval changes;     Comparison: 12/25/2017    Findings:   Single AP upright view of the chest. Right-sided IJ central venous  catheter and left-sided PICC line projecting over the cavoatrial  junction. The cardiomediastinal silhouette and upper abdomen are  unremarkable. Small bilateral pleural effusions. Diffuse patchy and  interstitial opacities appear stable from previous. No pneumothorax.      Impression    Impression:   1. Stable support devices as listed above.  2. Stable appearance of bilateral patchy and interstitial opacities.    I have personally reviewed the examination and initial interpretation  and I agree with the findings.    HUDSON WHITTAKER MD

## 2017-12-27 NOTE — CONSULTS
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  Her hospital course has been complicated by acute respiratory failure requiring intubation (now extubated as of 12/24), hypotension and shock requiring vasopressors. GI consulted today for evaluation of elevated liver tests and elevated lipase in the setting of epigastric abdominal pain. Alk phos significantly elevated to >1100 today with Tbili 4.1 (peaked at 8.4 on 12/23). Additionally today lipase was checked and found to be 1775. US abdomen completed 12/26 showing nondilated system with the common bile duct of 2 mm along with gallbladder sludge. Differential at this point is very broad and will consider medication reaction/drug induced autoimmune hepatitis, elevated LFT of the critically ill and less likely biliary obstruction. Due to lipase elevation and epigastric abdominal pain we should rule out biliary obstruction with MR/MRCP.    Abdominal pain likely multifactorial including Cdiff colitis, pancreatitis, opioid withdrawal and hepatitis. Nutrition is paramount at this point and would recommend calorie counts and replacing FT if not meeting goals.    Given h/o recent IVDU will also check for viral hepatis and HIV. Would recommend immunizing for HAV/HBV if not immune already.     Recommendations:  Obtain MR/MRCP   Check INR, viral hepatitis panel, HIV, BAILEY, F-actin, AMA  Trend LFT  Calorie counts, consider NJFT (if possible from  "sinusitis standpoint)  Continue po vancomycin for C diff treatment (diagnosed 12/22)  Continue NPO status for MRI  Analgesia/Antiemetics per primary team  Discussed with primary team    Gastroenterology follow up recommendations: JOSE    Thank you for involving us in this patient's care. Please do not hesitate to contact the GI service with any questions or concerns.     Pt seen and care plan discussed with Dr. Esteban, GI staff physician.    Lolita Colon PA-C     Advanced Endoscopy/Pancreaticobiliary LOS  [facility]  Pager *[phone]  -------------------------------------------------------------------------------------------------------------------       Reason for Consultation:   \"ALP elevation in pt with severe OD, lipase 1775. US with sludge only\"           History of Present Illness:   Patient seen and examined at 1300. History is obtained from the patient in the ICU.    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 Her hospital course has been complicated by acute respiratory failure requiring intubation (now extubated as of 12/24), hypotension and shock requiring vasopressors. GI consulted today for evaluation of elevated liver tests and elevated lipase in the setting of epigastric abdominal pain.  Patient reports that she has diffuse abdominal pain, cannot localize further.  She has never had pain like this before.  She reports that the oxycodone helps a little bit.  She denies nausea or vomiting.  She has not been having fevers.  She denies a history of pancreatitis.  She has been having multiple loose stools, was diagnosed with Clostridium difficile " "colitis on December 22 started on po vancomycin.     Most recent vitals included temperature of 99 F, blood pressure 133/103, respiratory rate 33, with O2 saturation of 90% on 2 L nasal cannula.  Today her liver tests are elevated including a total bilirubin of 4.1, alkaline phosphatase of 1143,  and AST of 190.  The alkaline phosphatase has slowly been increasing over the past few days. Her total bilirubin peaked at 8.3 on 12/23.  Her white blood cell count is 22K today which is down from 30K on 12/22 (when C diff was diagnosed and treatment started). Most recent INR was 1.53, checked on 12/22. US abdomen completed 12/26 showing nondilated system with the common bile duct of 2 mm along with gallbladder sludge - no cholecystitis.              Past Medical History:   Reviewed and edited as appropriate  Past Medical History:   Diagnosis Date     Anxiety      Bipolar disorder (H)      C. difficile colitis      Depressive disorder      Essential hypertension 7/8/2015     Gastro-oesophageal reflux disease      Hypothyroidism 4/1/2015     Migraine      Spontaneous pneumothorax     x3, resolved w/ pleurodesis      Suicide attempt             Past Surgical History:   Reviewed and edited as appropriate   Past Surgical History:   Procedure Laterality Date     ARTHROSCOPY KNEE      L knee     CERVIX SURGERY      for pre-cancerous changes     left knee surgery       ORTHOPEDIC SURGERY      L shoulder     THORACIC SURGERY      for pneumothorax, pleurodesis and lobe resection              Social History:   -Going through divorce (which apparently prompted suicide attempt)  -H/o heavy alcohol use, reports up to 1L per day for the past year. Also reports h/o IV heroin (\"one time\") within the last year. States that she \"dabbled\" in other drugs too but doesn't elaborate.            Family History:   Reviewed and edited as appropriate  Family History   Problem Relation Age of Onset     Depression Mother      Lipids Father      " "hyperlipidemia     Macular Degeneration Father              Allergies:   Reviewed and edited as appropriate     Allergies   Allergen Reactions     Amoxicillin-Pot Clavulanate      PN: LW Reaction: Itching, Pruritis     Azithromycin      Other reaction(s): Dermatitis     Cephalexin      PN: LW Reaction: Itching, Pruritis     Ciprofloxacin Other (See Comments)     Joint pain cdiff     Compazine [Prochlorperazine] Other (See Comments)     dystonia     Metoclopramide Other (See Comments)     \"I feel like I am crawling out of my skin\"     Minocycline Nausea and Vomiting     PN: DIARRHEA, VOMITING, AND STOMACH CRAMPS     Penicillins Itching     Reglan [Metoclopramide Hcl] Other (See Comments)     Sensation of \"crawling out of skin\"     Restoril [Temazepam]      Thorazine [Chlorpromazine] Other (See Comments)     dystonia     Abilify [Aripiprazole] Rash     Lamotrigine Rash     Severe drug rash - contraindication to receiving again. Skin peeling.      Sulfa Drugs Rash            Medications:     Current Facility-Administered Medications   Medication     oxyCODONE IR (ROXICODONE) tablet 5 mg     labetalol (NORMODYNE/TRANDATE) injection 20 mg     pantoprazole (PROTONIX) EC tablet 40 mg     folic acid (FOLVITE) tablet 1 mg     cloNIDine 0.1 mg/mL (CATAPRES) solution 0.1 mg     OLANZapine zydis (zyPREXA) ODT half-tab 2.5 mg     LORazepam (ATIVAN) tablet 1-2 mg    Or     LORazepam (ATIVAN) injection 1-2 mg     ondansetron (ZOFRAN) solution 4 mg     thiamine tablet 100 mg     predniSONE (DELTASONE) tablet 20 mg     ibuprofen (ADVIL/MOTRIN) suspension 400-600 mg     insulin aspart (NovoLOG) inj (RAPID ACTING)     mirtazapine (REMERON) tablet 15 mg     ipratropium - albuterol 0.5 mg/2.5 mg/3 mL (DUONEB) neb solution 3 mL     OLANZapine zydis (zyPREXA) ODT tab 5 mg     carvedilol (COREG) suspension 6.25 mg     hydrALAZINE (APRESOLINE) injection 10 mg     ipratropium - albuterol 0.5 mg/2.5 mg/3 mL (DUONEB) neb solution 3 mL     " levothyroxine (SYNTHROID/LEVOTHROID) tablet 50 mcg     vancomycin (VANOCIN) solution 125 mg     glucose 40 % gel 15-30 g    Or     dextrose 50 % injection 25-50 mL    Or     glucagon injection 1 mg     sodium chloride (OCEAN) 0.65 % nasal spray 1 spray     lidocaine (LMX4) kit     heparin lock flush 10 UNIT/ML injection 2-5 mL     dextrose 10 % 1,000 mL infusion     sodium chloride (PF) 0.9% PF flush 10-20 mL     heparin lock flush 10 UNIT/ML injection 5-10 mL     heparin lock flush 10 UNIT/ML injection 5-10 mL     naloxone (NARCAN) suspension 3 mg     0.9% sodium chloride infusion     enoxaparin (LOVENOX) injection 40 mg     bisacodyl (DULCOLAX) Suppository 10 mg     potassium phosphate 10 mmol in D5W 250 mL intermittent infusion     potassium phosphate 15 mmol in D5W 250 mL intermittent infusion     potassium phosphate 20 mmol in D5W 500 mL intermittent infusion     potassium phosphate 20 mmol in D5W 250 mL intermittent infusion     potassium phosphate 25 mmol in D5W 500 mL intermittent infusion     naloxone (NARCAN) injection 0.1-0.4 mg     potassium chloride SA (K-DUR/KLOR-CON M) CR tablet 20-40 mEq     potassium chloride (KLOR-CON) Packet 20-40 mEq     potassium chloride 20 mEq in 50 mL intermittent infusion     magnesium sulfate 1 gm in 100mL D5W intermittent infusion     magnesium sulfate 2 g in NS intermittent infusion (PharMEDium or FV Cmpd)     magnesium sulfate 3 g in NS intermittent infusion     magnesium sulfate 4 g in 100 mL sterile water (premade)             Review of Systems:   A complete review of systems was performed and is negative except as noted in the HPI      Skin: negative  Eyes: positive for scleral icteris  Ears/Nose/Throat: positive for sinusitis  Respiratory: No shortness of breath, dyspnea on exertion, cough, or hemoptysis  Cardiovascular: negative  Gastrointestinal: positive for abdominal pain and diarrhea  Genitourinary: negative  Musculoskeletal: negative  Neurologic:  negative  Psychiatric: positive for anxiety, depression, excessive alcohol consumption and suicide attempt  Hematologic/Lymphatic/Immunologic: negative  Endocrine: negative         Physical Exam:   Temp: 98.9  F (37.2  C) Temp src: Axillary BP: 156/86   Heart Rate: 96 Resp: (!) 35 SpO2: 93 % O2 Device: None (Room air) Oxygen Delivery: 2 LPM  Wt:   Wt Readings from Last 2 Encounters:   12/26/17 55.6 kg (122 lb 9.2 oz)   12/13/17 52.2 kg (115 lb)        General: Ill appearing female in NAD.  Answers appropriately.    HEENT: Head is AT/NC. Sclera +icteric. No conjunctival injection.  Oropharynx is clear, moist and w/o exudate or lesions.  Neck: No masses or thyromegaly.  Lungs: Clear to auscultation bilaterally anteriorly.  No wheezes, rhonchi or crackles.    Heart: Regular rate and rhythm.  No murmurs, gallops or rubs.  Normal S1 and S2.  Abdomen: Soft, tender diffusely, non-distended.  BS +.  No hepatosplenomegaly. No rebound or peritoneal signs  Extremities: No pedal edema.  Heme/Lymph: No cervical or supraclavicular adenopathy.   Skin: + jaundice, no rash  Neurologic: Grossly non-focal.  CN 2-12 grossly intact           Data:   Labs and imaging below were independently reviewed and interpreted    LAB WORK:    BMP  Recent Labs  Lab 12/27/17  1004 12/26/17  0520 12/25/17  1758 12/25/17  0433    142 140 150*   POTASSIUM 4.6 3.4 3.5 3.6   CHLORIDE 105 104 103 112*   ISAURO 8.5 7.8* 7.4* 7.6*   CO2 24 28 28 30   BUN 26 42* 54* 64*   CR 0.60 0.73 0.78 0.73   * 127* 197* 94     CBC  Recent Labs  Lab 12/27/17  1004 12/26/17  0520 12/25/17  0433   WBC 22.8* 20.6* 23.4*   RBC 3.09* 2.85* 2.87*   HGB 9.6* 8.9* 8.7*   HCT 30.9* 27.5* 27.2*    97 95   MCH 31.1 31.2 30.3   MCHC 31.1* 32.4 32.0   RDW 21.3* 21.0* 20.7*    299 249     INR  Recent Labs  Lab 12/22/17  0324   INR 1.53*     LFTs  Recent Labs  Lab 12/27/17  1004 12/26/17  0520 12/25/17  0433 12/24/17  0429   ALKPHOS 1143* 966* 852* 771*   AST  190* 194* 181* 184*   * 148* 138* 131*   BILITOTAL 4.1* 4.1* 4.8* 5.9*   PROTTOTAL 6.1* 5.2* 5.2* 4.8*   ALBUMIN 2.5* 2.3* 2.5* 2.2*      PANC  Recent Labs  Lab 12/26/17  0520   LIPASE 1775*       IMAGING:  EXAMINATION: US ABDOMEN LIMITED WITH DOPPLER COMPLETE, 12/26/2017  11:37 AM      COMPARISON: Ultrasound dating 12/20/2017.     HISTORY: Elevated alkaline phosphatase, concern for obstruction.     FINDINGS:   Fluid: No evidence of ascites or pleural effusions.     Liver: The liver demonstrates normal echotexture, measuring 19.4 cm in  craniocaudal dimension. There is no focal mass. Main portal vein  measures 9 mm in diameter     Extrahepatic portal vein flow is antegrade, measuring 23 cm/sec.  Right portal vein flow is antegrade, measuring 33 cm/sec.  Left portal vein flow is antegrade, measuring 21 cm/sec.     Flow in the hepatic artery is towards the liver and:  185 cm/sec peak systolic  0.62 resistive index.      The splenic vein is not visualized.  The left, middle, and right  hepatic veins are patent with flow towards the IVC. The IVC is patent  with flow towards the heart.   The aorta is not dilated.     Gallbladder: Layering sludge within the lumen of the gallbladder.  There is no wall thickening, pericholecystic fluid, positive  sonographic Bernstein's sign or evidence for cholelithiasis.     Bile Ducts: Both the intra- and extrahepatic biliary system are of  normal caliber.  The common bile duct measures 2 mm in diameter.     Pancreas: Obscured by overlying bowel gas.      Kidney: The right kidney measures 10.9 cm long. Parenchyma is of  normal thickness. No focal mass or hydronephrosis., no shadowing renal  calculi, cystic lesion or mass.             IMPRESSION: Gallbladder sludge without evidence of cholelithiasis or cholecystitis. Otherwise normal right upper quadrant ultrasound with Doppler assessment.           =======================================================================

## 2017-12-27 NOTE — PLAN OF CARE
Problem: Patient Care Overview  Goal: Plan of Care/Patient Progress Review  D: Pt with suicide attempt via Ca channel blocker, Tylenol, and ETOH overdose  A/I:Pts A&O throughout shift. Pt remained appropriate. Sitter in room to assist with cares and emotional support. Minimal reports of pain or anxiety today. VSS, remained on HF @ 40% through aerosol mask. Pt had one episode of desaturation, O2 increased to 100% and pt encouraged to cough, was able to produce thick tan mucus. Sats improved and was able to be weaned back down to 40%, was able to continue to wean O2 down to 30% and intermittently on RA. Switched to oxymask @ 3L. Occasionally tachypeic. Speech evaluated and advanced to regular diet. Pt tolerating diet well. PT/OT evaluated, pivot transfer assist of 2. See OT note. 6B orders. Labs abnormal, abdominal ultrasound done, see report.  P: Continue to monitor pts respiratory status. Treat pain and anxiety. Transfer pt. Alert team to any changes.

## 2017-12-27 NOTE — PLAN OF CARE
Problem: Patient Care Overview  Goal: Plan of Care/Patient Progress Review  OT: cancel, pt with another caregiver.

## 2017-12-27 NOTE — PROGRESS NOTES
12/27/17 1528   Quick Adds   Type of Visit Initial PT Evaluation   Living Environment   Lives With alone   Living Arrangements apartment   Home Accessibility stairs to enter home   Number of Stairs to Enter Home 6   Number of Stairs Within Home 0   Stair Railings at Home none   Living Environment Comment pt lives alone in an apt   Self-Care   Dominant Hand right   Usual Activity Tolerance good   Regular Exercise no   Equipment Currently Used at Home none   Activity/Exercise/Self-Care Comment IND at baseline   Functional Level Prior   Ambulation 0-->independent   Transferring 0-->independent   Toileting 0-->independent   Bathing 0-->independent   Dressing 0-->independent   Eating 0-->independent   Communication 0-->understands/communicates without difficulty   Swallowing 0-->swallows foods/liquids without difficulty   Cognition 0 - no cognition issues reported   Fall history within last six months no   Which of the above functional risks had a recent onset or change? ambulation;transferring   Prior Functional Level Comment IDN at baseline   General Information   Onset of Illness/Injury or Date of Surgery - Date 12/13/17   Referring Physician Raymon Pedro MD   Patient/Family Goals Statement to get stronger   Pertinent History of Current Problem (include personal factors and/or comorbidities that impact the POC) 47-year-old female with history of bipolar disorder with depression, polysubstance abuse, and prior history of suicide attempts who was initially admitted to [facility] on 12/13 with altered mentation after intentional overdose with unknown quantity of amlodipine, NyQuil, Tylenol and vodka.  She was subsequently transferred to [facility] for further management of aforementioned overdose on mechanical ventilator, high-dose insulin infusion and N-acetylcysteine.   Heart Disease Risk Factors Medical history   General Observations pt lethargic, alert   Cognitive Status Examination   Orientation  "orientation to person, place and time   Level of Consciousness alert   Follows Commands and Answers Questions 100% of the time   Personal Safety and Judgment intact   Memory intact   Pain Assessment   Patient Currently in Pain No   Range of Motion (ROM)   ROM Comment WFL   Strength   Strength Comments not overtly tested, >3/5 per mobility    Bed Mobility   Bed Mobility Comments mod assist   Transfer Skills   Transfer Comments min assist x 2 for sit to stand transfer   Gait   Gait Comments pt able to take steps to chair with min assist x 2   Balance   Balance Comments needs assist for standing and seated balance   Sensory Examination   Sensory Perception no deficits were identified   General Therapy Interventions   Planned Therapy Interventions balance training;bed mobility training;progressive activity/exercise;home program guidelines;risk factor education;transfer training;strengthening;gait training   Clinical Impression   Criteria for Skilled Therapeutic Intervention yes, treatment indicated   PT Diagnosis Impaired mobility    Influenced by the following impairments weakness, poor mobility   Functional limitations due to impairments impaired functional mobiltiy    Clinical Presentation Evolving/Changing   Clinical Presentation Rationale pt with complicated medical and psych history   Clinical Decision Making (Complexity) Moderate complexity   Therapy Frequency` 5 times/week   Predicted Duration of Therapy Intervention (days/wks) 1 week    Anticipated Discharge Disposition Other (see comments);Acute Rehabilitation Facility  (pt may need IP psych)   Risk & Benefits of therapy have been explained Yes   Patient, Family & other staff in agreement with plan of care Yes   Clinical Impression Comments pt very weak with below baseline mobility   [facility] AM-PAC TM \"6 Clicks\"   2016, Trustees of [facility], under license to [employer].  All rights reserved.   6 Clicks Short Forms Basic Mobility Inpatient " "Short Form   [facility] AM-PAC  \"6 Clicks\" V.2 Basic Mobility Inpatient Short Form   1. Turning from your back to your side while in a flat bed without using bedrails? 3 - A Little   2. Moving from lying on your back to sitting on the side of a flat bed without using bedrails? 2 - A Lot   3. Moving to and from a bed to a chair (including a wheelchair)? 2 - A Lot   4. Standing up from a chair using your arms (e.g., wheelchair, or bedside chair)? 3 - A Little   5. To walk in hospital room? 2 - A Lot   6. Climbing 3-5 steps with a railing? 1 - Total   Basic Mobility Raw Score (Score out of 24.Lower scores equate to lower levels of function) 13   Total Evaluation Time   Total Evaluation Time (Minutes) 5     "

## 2017-12-28 ENCOUNTER — APPOINTMENT (OUTPATIENT)
Dept: PHYSICAL THERAPY | Facility: CLINIC | Age: 47
DRG: 917 | End: 2017-12-28
Attending: SURGERY
Payer: COMMERCIAL

## 2017-12-28 ENCOUNTER — APPOINTMENT (OUTPATIENT)
Dept: OCCUPATIONAL THERAPY | Facility: CLINIC | Age: 47
DRG: 917 | End: 2017-12-28
Attending: SURGERY
Payer: COMMERCIAL

## 2017-12-28 ENCOUNTER — APPOINTMENT (OUTPATIENT)
Dept: GENERAL RADIOLOGY | Facility: CLINIC | Age: 47
DRG: 917 | End: 2017-12-28
Attending: PHYSICIAN ASSISTANT
Payer: COMMERCIAL

## 2017-12-28 LAB
ALBUMIN SERPL-MCNC: 2.3 G/DL (ref 3.4–5)
ALP SERPL-CCNC: 1017 U/L (ref 40–150)
ALT SERPL W P-5'-P-CCNC: 163 U/L (ref 0–50)
ANA SER QL IF: NEGATIVE
ANION GAP SERPL CALCULATED.3IONS-SCNC: 8 MMOL/L (ref 3–14)
AST SERPL W P-5'-P-CCNC: 164 U/L (ref 0–45)
BASOPHILS # BLD AUTO: 0 10E9/L (ref 0–0.2)
BASOPHILS NFR BLD AUTO: 0.1 %
BILIRUB SERPL-MCNC: 2.7 MG/DL (ref 0.2–1.3)
BUN SERPL-MCNC: 19 MG/DL (ref 7–30)
CALCIUM SERPL-MCNC: 8.2 MG/DL (ref 8.5–10.1)
CHLORIDE SERPL-SCNC: 107 MMOL/L (ref 94–109)
CO2 SERPL-SCNC: 26 MMOL/L (ref 20–32)
CREAT SERPL-MCNC: 0.59 MG/DL (ref 0.52–1.04)
CRP SERPL-MCNC: 18 MG/L (ref 0–8)
DIFFERENTIAL METHOD BLD: ABNORMAL
EOSINOPHIL # BLD AUTO: 0.6 10E9/L (ref 0–0.7)
EOSINOPHIL NFR BLD AUTO: 3 %
ERYTHROCYTE [DISTWIDTH] IN BLOOD BY AUTOMATED COUNT: 20.8 % (ref 10–15)
GFR SERPL CREATININE-BSD FRML MDRD: >90 ML/MIN/1.7M2
GLUCOSE BLDC GLUCOMTR-MCNC: 105 MG/DL (ref 70–99)
GLUCOSE BLDC GLUCOMTR-MCNC: 118 MG/DL (ref 70–99)
GLUCOSE BLDC GLUCOMTR-MCNC: 170 MG/DL (ref 70–99)
GLUCOSE BLDC GLUCOMTR-MCNC: 177 MG/DL (ref 70–99)
GLUCOSE SERPL-MCNC: 73 MG/DL (ref 70–99)
HAV IGG SER QL IA: NONREACTIVE
HAV IGM SERPL QL IA: NONREACTIVE
HBV CORE AB SERPL QL IA: NONREACTIVE
HBV SURFACE AB SERPL IA-ACNC: 30.29 M[IU]/ML
HBV SURFACE AG SERPL QL IA: NONREACTIVE
HCT VFR BLD AUTO: 26.9 % (ref 35–47)
HCV AB SERPL QL IA: NONREACTIVE
HGB BLD-MCNC: 8.4 G/DL (ref 11.7–15.7)
HIV 1+2 AB+HIV1 P24 AG SERPL QL IA: NONREACTIVE
IMM GRANULOCYTES # BLD: 0.7 10E9/L (ref 0–0.4)
IMM GRANULOCYTES NFR BLD: 4 %
LYMPHOCYTES # BLD AUTO: 3 10E9/L (ref 0.8–5.3)
LYMPHOCYTES NFR BLD AUTO: 16 %
MAGNESIUM SERPL-MCNC: 1.6 MG/DL (ref 1.6–2.3)
MCH RBC QN AUTO: 31.5 PG (ref 26.5–33)
MCHC RBC AUTO-ENTMCNC: 31.2 G/DL (ref 31.5–36.5)
MCV RBC AUTO: 101 FL (ref 78–100)
MONOCYTES # BLD AUTO: 1.6 10E9/L (ref 0–1.3)
MONOCYTES NFR BLD AUTO: 8.8 %
NEUTROPHILS # BLD AUTO: 12.7 10E9/L (ref 1.6–8.3)
NEUTROPHILS NFR BLD AUTO: 68.1 %
NRBC # BLD AUTO: 0 10*3/UL
NRBC BLD AUTO-RTO: 0 /100
PHOSPHATE SERPL-MCNC: 2.9 MG/DL (ref 2.5–4.5)
PLATELET # BLD AUTO: 345 10E9/L (ref 150–450)
POTASSIUM SERPL-SCNC: 4.2 MMOL/L (ref 3.4–5.3)
PROT SERPL-MCNC: 5.6 G/DL (ref 6.8–8.8)
RBC # BLD AUTO: 2.67 10E12/L (ref 3.8–5.2)
SODIUM SERPL-SCNC: 141 MMOL/L (ref 133–144)
WBC # BLD AUTO: 18.6 10E9/L (ref 4–11)

## 2017-12-28 PROCEDURE — 00000146 ZZHCL STATISTIC GLUCOSE BY METER IP

## 2017-12-28 PROCEDURE — 71010 XR CHEST PORT 1 VW: CPT

## 2017-12-28 PROCEDURE — 25000125 ZZHC RX 250: Performed by: STUDENT IN AN ORGANIZED HEALTH CARE EDUCATION/TRAINING PROGRAM

## 2017-12-28 PROCEDURE — 25000132 ZZH RX MED GY IP 250 OP 250 PS 637

## 2017-12-28 PROCEDURE — 25000132 ZZH RX MED GY IP 250 OP 250 PS 637: Performed by: INTERNAL MEDICINE

## 2017-12-28 PROCEDURE — 40000193 ZZH STATISTIC PT WARD VISIT

## 2017-12-28 PROCEDURE — 94640 AIRWAY INHALATION TREATMENT: CPT

## 2017-12-28 PROCEDURE — 86140 C-REACTIVE PROTEIN: CPT | Performed by: INTERNAL MEDICINE

## 2017-12-28 PROCEDURE — 97530 THERAPEUTIC ACTIVITIES: CPT | Mod: GP

## 2017-12-28 PROCEDURE — 25000128 H RX IP 250 OP 636: Performed by: INTERNAL MEDICINE

## 2017-12-28 PROCEDURE — 25000132 ZZH RX MED GY IP 250 OP 250 PS 637: Performed by: STUDENT IN AN ORGANIZED HEALTH CARE EDUCATION/TRAINING PROGRAM

## 2017-12-28 PROCEDURE — 99233 SBSQ HOSP IP/OBS HIGH 50: CPT | Performed by: INTERNAL MEDICINE

## 2017-12-28 PROCEDURE — 99207 ZZC APP CREDIT; MD BILLING SHARED VISIT: CPT | Performed by: PHYSICIAN ASSISTANT

## 2017-12-28 PROCEDURE — 25000132 ZZH RX MED GY IP 250 OP 250 PS 637: Performed by: PHYSICIAN ASSISTANT

## 2017-12-28 PROCEDURE — 40000275 ZZH STATISTIC RCP TIME EA 10 MIN

## 2017-12-28 PROCEDURE — 12000001 ZZH R&B MED SURG/OB UMMC

## 2017-12-28 PROCEDURE — 25000125 ZZHC RX 250: Performed by: INTERNAL MEDICINE

## 2017-12-28 PROCEDURE — 85025 COMPLETE CBC W/AUTO DIFF WBC: CPT | Performed by: INTERNAL MEDICINE

## 2017-12-28 PROCEDURE — 84100 ASSAY OF PHOSPHORUS: CPT | Performed by: INTERNAL MEDICINE

## 2017-12-28 PROCEDURE — 74000 XR ABDOMEN PORT F1 VW: CPT

## 2017-12-28 PROCEDURE — 83735 ASSAY OF MAGNESIUM: CPT | Performed by: INTERNAL MEDICINE

## 2017-12-28 PROCEDURE — 97535 SELF CARE MNGMENT TRAINING: CPT | Mod: GO | Performed by: OCCUPATIONAL THERAPIST

## 2017-12-28 PROCEDURE — 80053 COMPREHEN METABOLIC PANEL: CPT | Performed by: INTERNAL MEDICINE

## 2017-12-28 PROCEDURE — 40000133 ZZH STATISTIC OT WARD VISIT: Performed by: OCCUPATIONAL THERAPIST

## 2017-12-28 RX ORDER — IPRATROPIUM BROMIDE AND ALBUTEROL SULFATE 2.5; .5 MG/3ML; MG/3ML
3 SOLUTION RESPIRATORY (INHALATION) EVERY 6 HOURS PRN
Status: DISCONTINUED | OUTPATIENT
Start: 2017-12-28 | End: 2018-01-05 | Stop reason: HOSPADM

## 2017-12-28 RX ADMIN — LORAZEPAM 2 MG: 1 TABLET ORAL at 15:45

## 2017-12-28 RX ADMIN — Medication 125 MG: at 20:13

## 2017-12-28 RX ADMIN — Medication 3 MG: at 15:00

## 2017-12-28 RX ADMIN — FOLIC ACID 1 MG: 1 TABLET ORAL at 08:55

## 2017-12-28 RX ADMIN — LORAZEPAM 2 MG: 1 TABLET ORAL at 09:08

## 2017-12-28 RX ADMIN — Medication 125 MG: at 11:30

## 2017-12-28 RX ADMIN — OLANZAPINE 5 MG: 5 TABLET, ORALLY DISINTEGRATING ORAL at 20:11

## 2017-12-28 RX ADMIN — CARVEDILOL 6.25 MG: 25 TABLET, FILM COATED ORAL at 20:13

## 2017-12-28 RX ADMIN — OXYCODONE HYDROCHLORIDE 5 MG: 5 TABLET ORAL at 20:12

## 2017-12-28 RX ADMIN — Medication 125 MG: at 08:59

## 2017-12-28 RX ADMIN — LORAZEPAM 2 MG: 1 TABLET ORAL at 21:11

## 2017-12-28 RX ADMIN — PREDNISONE 20 MG: 20 TABLET ORAL at 08:55

## 2017-12-28 RX ADMIN — ENOXAPARIN SODIUM 40 MG: 40 INJECTION SUBCUTANEOUS at 11:30

## 2017-12-28 RX ADMIN — OXYCODONE HYDROCHLORIDE 5 MG: 5 TABLET ORAL at 15:00

## 2017-12-28 RX ADMIN — IPRATROPIUM BROMIDE AND ALBUTEROL SULFATE 3 ML: .5; 3 SOLUTION RESPIRATORY (INHALATION) at 08:33

## 2017-12-28 RX ADMIN — Medication 0.1 MG: at 09:00

## 2017-12-28 RX ADMIN — Medication 2 G: at 06:22

## 2017-12-28 RX ADMIN — CARVEDILOL 6.25 MG: 25 TABLET, FILM COATED ORAL at 08:59

## 2017-12-28 RX ADMIN — LEVOTHYROXINE SODIUM 50 MCG: 25 TABLET ORAL at 08:55

## 2017-12-28 RX ADMIN — Medication 3 MG: at 20:13

## 2017-12-28 RX ADMIN — Medication 125 MG: at 15:45

## 2017-12-28 RX ADMIN — OXYCODONE HYDROCHLORIDE 5 MG: 5 TABLET ORAL at 08:55

## 2017-12-28 RX ADMIN — Medication 0.1 MG: at 15:00

## 2017-12-28 RX ADMIN — Medication 3 MG: at 08:59

## 2017-12-28 RX ADMIN — MIRTAZAPINE 15 MG: 15 TABLET, FILM COATED ORAL at 21:11

## 2017-12-28 RX ADMIN — Medication 0.1 MG: at 20:13

## 2017-12-28 RX ADMIN — OLANZAPINE 5 MG: 5 TABLET, ORALLY DISINTEGRATING ORAL at 08:56

## 2017-12-28 RX ADMIN — Medication 100 MG: at 08:56

## 2017-12-28 RX ADMIN — OXYCODONE HYDROCHLORIDE 5 MG: 5 TABLET ORAL at 03:26

## 2017-12-28 RX ADMIN — PANTOPRAZOLE SODIUM 40 MG: 40 TABLET, DELAYED RELEASE ORAL at 08:56

## 2017-12-28 ASSESSMENT — PAIN DESCRIPTION - DESCRIPTORS
DESCRIPTORS: DISCOMFORT
DESCRIPTORS: DISCOMFORT

## 2017-12-28 NOTE — PLAN OF CARE
Problem: Patient Care Overview  Goal: Plan of Care/Patient Progress Review  Discharge Planner PT   Patient plan for discharge: Prefers to d/c home per SW note  Current status: Pt performed multiple sit<>stands and chair>bed pivot transfer with heavy physical assist. Unable to tolerate much activity due to c/o dizziness. BP stable and no nystagmus noted. O2 sats/HR stable as well.   Barriers to return to prior living situation: Dizziness and deconditioning  Recommendations for discharge: ARC  Rationale for recommendations: Rehab trajectory and motivation.       Entered by: Raymon Beckwith 12/28/2017 4:57 PM

## 2017-12-28 NOTE — PROGRESS NOTES
"Pt increasingly anxious throughout shift. Gave Ativan PO 2mg x2. Pt alert and mostly oriented, making odd and nonsensical statements at times. Pt asked about her belongings, then stated \"I had them in the room with all the cupcakes on the walls, you know, the giant cupcake with a cherry on it\". Pt unable to state if that room was here in the hospital or elsewhere. Pt up to chair with assist of 2 x2, generalized weakness, at times just decides to stop moving or not raise arms when asked as well (during transfer back to bed patient did fine with standing and pivoting, then flopped completely onto back in bed instead of sitting and was unable to move her legs back into bed, although once assisted into bed able to lift pelvis and shift side to side with encouragement). Pt NPO for 6 hours prior to MRI of abdomen, now done, awaiting results. Resumed diet and started calorie count per MD request. After 1 can of Ensure pt reported nausea, relieved with Zofran. Pt still having multiple loose stools per day, holding PO narcan.     Sitter at bedside for suicide precautions. Pt did not answer questions about suicidal thoughts today, instead redirected conversation when asked.   "

## 2017-12-28 NOTE — PLAN OF CARE
Problem: Overdose, Ingestion/Inhalants (Adult)  Goal: Signs and Symptoms of Listed Potential Problems Will be Absent, Minimized or Managed (Overdose, Ingestion/Inhalants)  Signs and symptoms of listed potential problems will be absent, minimized or managed by discharge/transition of care (reference Overdose, Ingestion/Inhalants (Adult) CPG).   Outcome: No Change  Assumed care at 2300. Recent overdose with multidrug + ETOH, recoving from ARDS. T max 99.6 Sinus rythmn. BP stable. Sating mid to high 90s on 2 LPM nc. Behavior appropriate, forgetful at times, oriented to place, time, self. Asking if she would be able to return home in the morning. Calm and cooperative. Ativan 2 mg given once prior to bedtime for anxiety/sleep. Continues scheduled oxycodone 5 mg. Attendant at bedside. Larry counts in place, drinking apple juice/eating apple sauce. Monitor for change in condition.

## 2017-12-28 NOTE — PROGRESS NOTES
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 ICU course c/b lactic acidosis, acute hypoxic/hypercarbic respiratory failure 2/2 aspiration requiring intubation c/b ARDS, ARF, severe hypotension/vasogenic shock requiring pressors, paroxysmal A.fib, intermittent SVT, and sepsis 2/2 sinusitis. Patient extubated 12/24/17, transferred to medicine 12/25 for ongoing care.     Intentional overdose 12/13 with bipolar disorder, depression, anxiety, etoh abuse, possible benzodiazapine w/d: Overdose with amlodipine, Tylenol, Nyquil and Etoh. PTA drinking 16 oz vodka daily for 1.5 days. Treated with Ca gtt, high dose insulin, and NAC per poison control. Extubated 12/24. Sitter discontinued 12/28 at psych recs. Will likely d/c to inpatient psychiatry vs TCU.  - Suicide precautions   - Continue scheduled ativan, Remeron, olanzapine    - Daily thiamine, folic acid, MVI    Acute pancreatitis, transaminitis: Lipase 1775 12/26. Previously normal. US no stones. MRCP normal 12/27. ALP 1017 (1143), Tbili 2.7 (4.1). ALT, AST stable. Diffuse abdominal tenderness. GI following, thought to be 2/2 cholestasis of sepsis and critical illness.   - GI following  - Allow for regular diet, would stop and give IVF if acute worsening     Diarrhea, recurrent C. Diff: H/o chronic infection s/p failed fecal transplant (8/2016). C.diff +12/22. Currently on PO Vancomycin with ongoing high volume and number of stools. WBC 18.6 (22.8). Per GI, uncommon that one would have vanc resistant c diff, thus concerning for other etiology such as toxic megacolon,  pseudomembranous colitis.   - GI following  - KUB, CRP today   - Continue PO vancomycin QID through 1/1/18. Per GI, may need to transition to Fibaxomicin OP if ongoing issue  - Per GI, would consider colonoscopy with bx if not getting better    ARDS 2/2 aspiration, acute hypoxic/hypercarbic respiratory failure: Initially d/t overdose, V/Q mismatch in setting of ARDS. S/p intubation (initially prone) flolan, solumedrol, diuretics, and Duobneb. Extubated 12/24. Currently on 40% HFNC with stable sats. CXR 12/25 waxing and waning bilateral diffuse pulmonary opacities. Possibility of alveolar hemorrhage given elevated BUN, Tbili, and acte anemia. Treated with Solumedrol, transitioned to oral prednisone x3 days 12/26  - Repeat CXR  - Last dose steroids today   - Acapella, supplemental O2, prn Duonebs     Likely acute opiate withdrawal:  S/s of opiate withdrawal 2/2 high fentanyl dosing during intubation (chills, myalgia, diarrhea, daihproesis, abdominal pain). Although several symptoms can be connected to other etiology (ex. Diarrhea and c diff, abdominal pain and pancreatitis), conglomeration of symptoms likely 2/2 opiate withdrawal. Started on scheduled opiates 12/27 with improvement  - Continue scheduled oxycodone q6h, will likely decrease to q8h 12/29    Altered mental status: Improving. Waxing and waning mental clarity. A&O x3, however, reports of hallucinations and inappropriate comments. Lactic acid normal. Likely multifactorial including rapid cycling bipolar with psychotic features, possible drug-induced psychosis, ongoing c diff, and possible hospital acquired delirium    Severe malnutrition: Cleared for regular diet, although oral intake has been poor. Prealbumin normal 12/28  - Nutrition following  - Calorie counts, may need to consider TF    HTN: PTA on Norvasc. Currently on Hydralazine, clonidine and Coreg. BP stable.   - Continue Hydralazine, clonidine and Coreg with hold parameters   - Labetalol prn for  SBP>175 or DBP>110    Pressure injury: Not present on admission. Bilateral cheek and left forearm  - WOCN following      Normocytic anemia: Hgb 10.4 on admit. Drop to 5.8 12/19, s/p 1U RBC. Smear with marked normochromic, normocytic anemia, very rare RBC fragments and spherocytes. Haptoglobin 63, . Concern for possible alveolar hemorrhage as above. Possible 2/2 chronic Etoh use. Hgb now stable  - Continue Folic acid      Resolved Hospital Problems and Stable, Chronic Medical Conditions:  Hypernatremia: Consistently elevated from admission to 12/25, normalized 12/26. Thought 2/2 hypovolemia.   ARF: 2/2 CCB overdose. Cr peak 1.62. C/b volume overload and pulmonary edema, treated with IV lasix.  SVT: Presented with HR 190s, resolved with IVF  Paroxysmal a fib: Brief episode of a fib 12/20 with conversion to NSR with metoprolol x2. No further episodes. On Coreg as above   Coagulopathy: In the setting of etoh use, poor nutrition. S/p vitamin K. No s/s of acute bleeding. Normalized 12/27  Hypothyroidism: TSH 5.06, T4 normal. Continue PTA synthroid  - Recheck in 6 weeks  Incidental MRCP findings: Abnormal soft tissue posterior to the fundus of the stome, similar to 1/2017 CT  - Follow up OP for biopsy       Diet: Regular Diet Adult  Calorie Counts  Snacks/Supplements Adult: Between Meals  Fluids: TKO  DVT Prophylaxis: Enoxaparin (Lovenox) SQ  Code Status: Full Code    Disposition Plan   Expected discharge: 3-5 days, recommended to TCU vs inpatient psychiatry once physically stronger, medically stable, disposition plan determined.     Entered: Nehal Munson 12/28/2017, 1:48 PM   Information in the above section will display in the discharge planner report.      The patient's care was discussed with the patient, nursing, care coordination, and attending physician, Dr. Triston Munson  Internal Medicine Staff Hospitalist Service  [facility]  Pager: [phone]  Please see  sticky note for cross cover information    Interval History   Stable. Still becomes dyspneic with little to no movement. Unsure if she has the strength to sit in a chair right now. Abdominal pain unchanged. Appetite poor, some pain with solids but can tolerate liquids. Denies confusion. Ongoing high volume and high number of BMs today which is concerning for her. Denies thoughts of self harm     Data reviewed today: I reviewed all medications, new labs and imaging results over the last 24 hours. I personally reviewed pertinent notes and imaging     Physical Exam   Vital Signs: Temp: 98  F (36.7  C) Temp src: Oral BP: 134/78   Heart Rate: 70 Resp: 22 SpO2: 98 % O2 Device: Nasal cannula Oxygen Delivery: 2 LPM  Weight: 126 lbs 1.65 oz  General Appearance: Alert and oriented x3. Appears comfortable  Respiratory: CATB without wheezing, crackles, or rales  Cardiovascular: RRR without murmur. S1, S2  GI: Abdomen soft, diffuse moderate tender, active bowel sounds. No guarding or rebound   Skin: No rash or jaundice noted. LUE with bandage covering part of forearm  Other: Trace BLE peripheral edema, distal pulses palpable. Moves all extremities         Data   Data     Recent Labs  Lab 12/28/17  0306 12/27/17  2103 12/27/17  1004 12/26/17  0520  12/22/17  0324   WBC 18.6*  --  22.8* 20.6*  < > 30.6*   HGB 8.4*  --  9.6* 8.9*  < > 7.9*   *  --  100 97  < > 96     --  363 299  < > 98*   INR  --  1.09  --   --   --  1.53*     --  138 142  < > 149*   POTASSIUM 4.2  --  4.6 3.4  < > 3.3*   CHLORIDE 107  --  105 104  < > 102   CO2 26  --  24 28  < > 39*   BUN 19  --  26 42*  < > 79*   CR 0.59  --  0.60 0.73  < > 1.18*   ANIONGAP 8  --  8 10  < > 8   ISAURO 8.2*  --  8.5 7.8*  < > 8.2*   GLC 73  --  219* 127*  < > 175*   ALBUMIN 2.3*  --  2.5* 2.3*  < > 2.9*   PROTTOTAL 5.6*  --  6.1* 5.2*  < > 5.2*   BILITOTAL 2.7*  --  4.1* 4.1*  < > 6.0*   ALKPHOS 1017*  --  1143* 966*  < > 591*   *  --  166* 148*  < > 92*    *  --  190* 194*  < > 241*   LIPASE  --   --   --  1775*  --   --    < > = values in this interval not displayed.  Recent Results (from the past 24 hour(s))   MR Abdomen MRCP w/o & w Contrast    Narrative    MRCP Without and With Contrast    CLINICAL HISTORY: ALP elevation without evidence of stone on US;     DATE: 12/27/2017 6:30 PM    TECHNIQUE:     Images were acquired with and without intravenous gadolinium contrast  through the upper abdomen. The following MR images were acquired  without intravenous contrast: TrueFISP, multiplanar T2-weighted, axial  T1 in/out of phase, T2-weighted MRCP images, axial diffusion-weighted  and axial apparent diffusion coefficient. T1-weighted images were  obtained before contrast at the multiple time points following  contrast injection. 3-D reformatted images were generated by the  technologist. Contrast dose: 7.5mL Gadavist. Exam limited due to  motion artifact.    Comparison study: CT 12/16/2017, outside CT 1/26/2017 and ultrasound  12/26/2017    FINDINGS:    Biliary Tree: No intrahepatic or extra hepatic biliary ductal  dilatation. No signal defect to suggest choledocholithiasis. Low  insertion of the cystic duct.    Pancreas: No focal pancreatic lesion. Preserved T1 hyperintensity of  the pancreas on T1-weighted fat-suppressed images. Main pancreatic  duct is not dilated.    Liver: Noncirrhotic hepatic configuration. No steatosis or iron  deposition. No worrisome liver lesion.    Gallbladder: No cholelithiasis.    Spleen: Normal. Not enlarged.    Kidneys: Normal. No hydronephrosis.    Adrenal glands: Negative.    Bowel: No bowel obstruction.    Lymph nodes: No upper abdominal lymphadenopathy.    Blood vessels: No abdominal aortic aneurysm. Hepatic and portal venous  systems are patent.    Lung bases: Areas of consolidation in both lung lung bases. Trace  bilateral pleural effusions.    Bones and soft tissues: No suspicious lesion.    Mesentery and abdominal wall:  Normal. Abnormal soft tissue  posteromedial to the fundus of the stomach, similar to outside CT  1/26/2017. No associated enhancement. As per care everywhere, this  seems to have been evaluated in the past previous exams. The largest  component of this is T2 hyperintense, T1 isointense and measures 2.6  cm in maximum axial dimension.    Ascites: None      Impression    IMPRESSION:   1. No intrahepatic or extra hepatic biliary ductal dilatation. No  cholelithiasis or choledocholithiasis.  2. Abnormal soft tissue posterior to the fundus of the stomach,  similar to outside CT 1/26/2017. Comparison with old exams/biopsy  report which have been done as per care everywhere is recommended. The  differential diagnosis for such an appearance include  mesenteric/gastric gastrointestinal stromal tumor, gastric duplication  cyst; unlikely to represent a gastric diverticulum in view of no air  noted on any of these exams.  3. Bibasilar pulmonary opacities, being followed up by chest  radiographs.    I have personally reviewed the examination and initial interpretation  and I agree with the findings.    KATERINA ALTAMIRANO MD   XR Chest Port 1 View    Narrative    Exam: XR CHEST PORT 1 VW, 12/28/2017 10:13 AM    Indication: ARDS, interval changes;     Comparison: 12/27/2017    Findings:   Single portable view of the chest. Left-sided PICC line projecting  over the cavoatrial junction. The cardiomediastinal silhouette is  normal. Pulmonary vasculature remains indistinct. Small bilateral  pleural effusions are unchanged. Diffuse patchy and interstitial  opacities are unchanged. No pneumothorax.      Impression    Impression: Stable diffuse and patchy bilateral opacities, small  bilateral pleural effusions and mild pulmonary edema.    I have personally reviewed the examination and initial interpretation  and I agree with the findings.    JAMES HOWELL MD ([place])   XR Abdomen Port 1 View    Narrative    Exam: Abdominal x-ray,  12/28/2017.    COMPARISON: 12/27/2017.    HISTORY: Diarrhea, positive for C. difficile. Concern for toxic  megacolon.    FINDINGS: Supine view of the abdomen was obtained. Nonobstructive  bowel gas pattern with scattered gas-filled loops of predominantly  large bowel, not dilated. The inferior portions of the abdomen were  collimated out of the image. No pneumatosis. No portal venous gas.  Evaluation of free air is limited in the supine position. Prominent  right lobe of the liver correlating with MRI 12/27/2017.      Impression    IMPRESSION: Nonobstructive bowel gas pattern with scattered gas-filled  loops of predominantly large bowel, not dilated.    BAILEY GONZALEZ MD

## 2017-12-28 NOTE — PROGRESS NOTES
[facility]  HEPATOLOGY PROGRESS NOTE  Ana Laura Wharton [id number]       CC: elevated liver tests  SUBJECTIVE:  Continues to have cramping abdominal pain that occasional radiates to her back. He appetite is decreased and she is also limiting her intake due to concern for increased stooling. She has fecal urgency and incontinence and she denies melena or hematochezia. She denies fevers or change in mentation.   She did not take any other herbal medication or supplements.     ROS:  A 10-point review of systems was negative.    OBJECTIVE:  VS: /78  Temp 98  F (36.7  C) (Oral)  Resp 20  Wt 57.2 kg (126 lb 1.7 oz)  SpO2 91%  BMI 22.34 kg/m2   General: In no acute distress, no facial muscle wasting  Neuro: AOx3, No asterixis  Lymph: No cervical lymphadenopathy  HEENT:  Minimal scleral icterus, Nooral lesions  CV: . Skin warm and dry  Lungs:  Respirations even and nonlabored on room air  Abd:  Nondistended. +BS  Extrem: No peripehral edema  Skin: mild jaundice,  Psych: Flat, interactive    MEDICATIONS:  Current Facility-Administered Medications   Medication     ipratropium - albuterol 0.5 mg/2.5 mg/3 mL (DUONEB) neb solution 3 mL     oxyCODONE IR (ROXICODONE) tablet 5 mg     labetalol (NORMODYNE/TRANDATE) injection 20 mg     pantoprazole (PROTONIX) EC tablet 40 mg     folic acid (FOLVITE) tablet 1 mg     cloNIDine 0.1 mg/mL (CATAPRES) solution 0.1 mg     OLANZapine zydis (zyPREXA) ODT half-tab 2.5 mg     LORazepam (ATIVAN) tablet 1-2 mg    Or     LORazepam (ATIVAN) injection 1-2 mg     ondansetron (ZOFRAN) solution 4 mg     thiamine tablet 100 mg     ibuprofen (ADVIL/MOTRIN) suspension 400-600 mg     insulin aspart (NovoLOG) inj (RAPID ACTING)     mirtazapine (REMERON) tablet 15 mg     OLANZapine zydis (zyPREXA) ODT tab 5 mg     carvedilol (COREG) suspension 6.25 mg     hydrALAZINE (APRESOLINE) injection 10 mg     levothyroxine (SYNTHROID/LEVOTHROID) tablet 50 mcg     vancomycin (VANOCIN) solution 125 mg      glucose 40 % gel 15-30 g    Or     dextrose 50 % injection 25-50 mL    Or     glucagon injection 1 mg     sodium chloride (OCEAN) 0.65 % nasal spray 1 spray     lidocaine (LMX4) kit     heparin lock flush 10 UNIT/ML injection 2-5 mL     dextrose 10 % 1,000 mL infusion     sodium chloride (PF) 0.9% PF flush 10-20 mL     heparin lock flush 10 UNIT/ML injection 5-10 mL     heparin lock flush 10 UNIT/ML injection 5-10 mL     naloxone (NARCAN) suspension 3 mg     enoxaparin (LOVENOX) injection 40 mg     bisacodyl (DULCOLAX) Suppository 10 mg     potassium phosphate 10 mmol in D5W 250 mL intermittent infusion     potassium phosphate 15 mmol in D5W 250 mL intermittent infusion     potassium phosphate 20 mmol in D5W 500 mL intermittent infusion     potassium phosphate 20 mmol in D5W 250 mL intermittent infusion     potassium phosphate 25 mmol in D5W 500 mL intermittent infusion     naloxone (NARCAN) injection 0.1-0.4 mg     potassium chloride SA (K-DUR/KLOR-CON M) CR tablet 20-40 mEq     potassium chloride (KLOR-CON) Packet 20-40 mEq     potassium chloride 20 mEq in 50 mL intermittent infusion     magnesium sulfate 1 gm in 100mL D5W intermittent infusion     magnesium sulfate 2 g in NS intermittent infusion (PharMEDium or FV Cmpd)     magnesium sulfate 3 g in NS intermittent infusion     magnesium sulfate 4 g in 100 mL sterile water (premade)       REVIEW OF LABORATORY, PATHOLOGY AND IMAGING RESULTS:  BMP  Recent Labs  Lab 12/28/17  0306 12/27/17  1004 12/26/17  0520 12/25/17  1758    138 142 140   POTASSIUM 4.2 4.6 3.4 3.5   CHLORIDE 107 105 104 103   ISAURO 8.2* 8.5 7.8* 7.4*   CO2 26 24 28 28   BUN 19 26 42* 54*   CR 0.59 0.60 0.73 0.78   GLC 73 219* 127* 197*     CBC  Recent Labs  Lab 12/28/17  0306 12/27/17  1004 12/26/17  0520 12/26/17  0452   WBC 18.6* 22.8* 20.6* Unsatisfactory specimen - clotted   RBC 2.67* 3.09* 2.85* Unsatisfactory specimen - clotted   HGB 8.4* 9.6* 8.9* Unsatisfactory specimen - clotted   HCT  26.9* 30.9* 27.5* Unsatisfactory specimen - clotted   * 100 97 Unsatisfactory specimen - clotted   MCH 31.5 31.1 31.2 Unsatisfactory specimen - clotted   MCHC 31.2* 31.1* 32.4 Unsatisfactory specimen - clotted   RDW 20.8* 21.3* 21.0* Unsatisfactory specimen - clotted    363 299 Unsatisfactory specimen - clotted     INR  Recent Labs  Lab 12/27/17  2103 12/22/17  0324   INR 1.09 1.53*     LFTs  Recent Labs  Lab 12/28/17  0306 12/27/17  1004 12/26/17  0520 12/25/17  0433   ALKPHOS 1017* 1143* 966* 852*   * 190* 194* 181*   * 166* 148* 138*   BILITOTAL 2.7* 4.1* 4.1* 4.8*   PROTTOTAL 5.6* 6.1* 5.2* 5.2*   ALBUMIN 2.3* 2.5* 2.3* 2.5*      PANC  Recent Labs  Lab 12/26/17  0520   LIPASE 1775*      MELD-Na score: 11 at 12/28/2017  3:06 AM  MELD score: 11 at 12/28/2017  3:06 AM  Calculated from:  Serum Creatinine: 0.59 mg/dL (Rounded to 1) at 12/28/2017  3:06 AM  Serum Sodium: 141 mmol/L (Rounded to 137) at 12/28/2017  3:06 AM  Total Bilirubin: 2.7 mg/dL at 12/28/2017  3:06 AM  INR(ratio): 1.09 at 12/27/2017  9:03 PM  Age: 47 years      Imaging Results:  MRCP 12/27/17  IMPRESSION:   1. No intrahepatic or extra hepatic biliary ductal dilatation. No  cholelithiasis or choledocholithiasis.  2. Abnormal soft tissue posterior to the fundus of the stomach,  similar to outside CT 1/26/2017. Comparison with old exams/biopsy  report which have been done as per care everywhere is recommended. The  differential diagnosis for such an appearance include  mesenteric/gastric gastrointestinal stromal tumor, gastric duplication  cyst; unlikely to represent a gastric diverticulum in view of no air  noted on any of these exams.  3. Bibasilar pulmonary opacities, being followed up by chest  radiographs.    IMPRESSION:  Ana Laura Wharton is a 47 year old female with a history of hypertension, bipolar disorder, anxiety, refractory depression with multiple suicide attempts, recurrent c-diff who is s/p fecal transplant in  2016, hypothyroidism who was admitted after an ingestion of amlodipine, NyQuil, Tylenol, and vodka suicide attempt. Her hospital course has been complicated by prolonged intubation with ARDS, treatment with NAC for acetaminophen, recurrent c-diff and elevated liver tests. She initially had almost normalization of her liver tests that increased on 12/19.     RECOMMENDATIONS:  1. Elevated liver tests, related to critical illness  - MRCP from 12/27 does not have evidence of a passed stone or obstruction.   - review of her chart does not had evidence of new medication while inpatient that would cause a cholestatic injury  - There is concerns for sepsis due to c-diff and pulmonary infections which can also cause elevations.  viral serologies, free T4, BAILEY negative  - continue to trend liver tests, no other intervention needed.     2. Chronic C-Diff  - Has had x6 episodes of C-diff in the past 2 years. Did have one episode post FMT and was treated with Fidaxomicin in 3/2017.   - Trend CRP and evaluate for decompensation with KUB for megacolon.   - continue vanco treatment. May transition to 10 day course of fidaxomicin at discharge.   - will discuss with my GI-luminal colleagues for other causes of diarrhea.     Patient was discussed with attending physician, Dr. Bowen/Sonny    Thank you for the opportunity to be involved with  Ana Laura Wharton [facility]. Please call with any questions or concerns.    Kayla Chaudhry, AKILAH, CNP  [phone]

## 2017-12-28 NOTE — PROGRESS NOTES
Social Work Services Progress Note    Hospital Day: 16  Date of Initial Social Work Evaluation:  12/14/17  Collaborated with:  Patient; Shantelle, Financial Counseling; [facility] Admissions    Data:  Pt requests to speak with a SW per Bernie, bedside RN.    Intervention:  Chart reviewed.  SW met with pt who tells writer that she is anticipating her [employer] policy to term come January 1st.  She states that she is in the process of going through a divorce, though this is on hold for the moment, but since the [employer] policy is through her , she anticipates this ending.  We discussed applying for MA so to have a secondary policy to Medicare and pt would like to go ahead with applying for MA.  She feels she is up to completing the MA application now.  SW informed pt that writer would refer pt to the Financial Counselors to assist with the MA application.    SW and pt also discussed discharge planning.  Pt tells writer that she prefers to d/c home with help from home care.  SW explained that the only 'covered' home care is skilled and would be intermittent visits, so pt would need be safe at home alone from both a physical, strength standpoint and from a mental health standpoint, free from suicidal thoughts.  We talked about both TCU and ARU as PT is currently recommending rehab.  Pt states she has never been to a rehab facility in the past and has anxiety surrounding going a facility that she is unfamiliar with.  SW provided supportive counseling around this anxiety.  SW did bring up the difficulty in finding a rehab facility that can also manage her mental health symptoms.  She is agreeable to referrals to  ARU (and if not ARU appropriate, to [facility]).  Pt tells SW that she is voluntary and gets to choose where she discharges to, which SW acknowledged.  SW asked pt if she is agreeable to go to an in-pt mental health unit, if strong and independent enough,  and pt states she is considering it.      FELICIA called and spoke with Shantelle Financial Counselor, [phone] re: MA application.  Shantelle referred Lucia Financial Counselor, to meet with pt on Fri 12/29.    FELICIA text-paged referral to [facility].      Assessment:  Rehab recommended at this time.  Pt agreeable.    Plan:    Anticipated Disposition:  Facility:  to be determined.    Barriers to d/c plan:  Medical stability;     Follow Up:  FELICIA will continue to follow.     SUYAPA Pritchard covering for ANGE Yepez  ICU  [phone] phone  [phone] pager

## 2017-12-28 NOTE — PLAN OF CARE
Problem: Patient Care Overview  Goal: Plan of Care/Patient Progress Review  Discharge Planner OT   Patient plan for discharge: unstated  Current status: pt mod assist for functional mobility, min to max assist for ADL's.   Barriers to return to prior living situation: deconditioning and cognitive deficits  Recommendations for discharge: TCU  Rationale for recommendations: Pt will require rehab to regain functional strength, endurance and cognitive for increased ADL I.        Entered by: Dharmesh Root 12/28/2017 3:59 PM   unstated

## 2017-12-28 NOTE — PROGRESS NOTES
"CLINICAL NUTRITION SERVICES - REASSESSMENT NOTE     Nutrition Prescription    RECOMMENDATIONS FOR MDs/PROVIDERS TO ORDER:  D/c PO Narcan.    Malnutrition Status:    Non-severe malnutrition in the context of chronic illness.     Recommendations already ordered by Registered Dietitian (RD):  Will enter a general order for snacks/Supplements so that pt can order them when she wants.          EVALUATION OF THE PROGRESS TOWARD GOALS   Diet: Regular + pending calorie counts  Nutrition Support: none  Intake: Per RN note, pt had an Ensure Plus yesterday and after it she experienced nausea. RN charting 25-50% of meals, but quantity of meals are not known. Meals are recorded as \"good\", then \"fair\" and now \"poor\" since pt was extubated. Unsure if pt would be agreeable to having a FT placed.     NEW FINDINGS   Extubated 12/24 and lost enteral access (OGT) with extubation. Weight is down 6.4 kg (10%) over the past 7 days with (likely) auto-diuresis. Note pt was started on Remeron on 12/24; hopefully this will help with pt's appetite. Pt received TF from 12/20-12/24. Pt busy with RNCC so unable to interview.     MALNUTRITION  % Intake: < 75% for > 7 days  % Weight Loss: > 2% in 1 week   Subcutaneous Fat Loss: Unable to assess  Muscle Loss: Unable to assess; assume the same as previous  Fluid Accumulation/Edema: Unable to assess; assume improved from last assessment  Malnutrition Diagnosis: Non-severe malnutrition in the context of chronic illness.     Previous Goals   Total avg nutritional intake to meet a minimum of 25 kcal/kg and 1.2 g PRO/kg daily (per dosing wt 62 kg).  Evaluation: Not met    Previous Nutrition Diagnosis  Inadequate protein-energy intake  Evaluation: No change    CURRENT NUTRITION DIAGNOSIS  Inadequate protein-energy intake related to decreased appetite s/p recent extubation, still with AMS at times as evidenced by pt likely eating 50% or less of most meals.      INTERVENTIONS  Implementation  Medical food " supplement therapy-  Ordered general supplement/snack so that pt can order them PRN.     Goals  Patient to consume >50% of nutritionally adequate meal trays TID, or the equivalent with supplements/snacks.    Monitoring/Evaluation  Progress toward goals will be monitored and evaluated per protocol. Will follow results from calorie counts.     Ghislaine Montes RD, LD  ([facility] dietitian, [phone]- [phone])

## 2017-12-29 ENCOUNTER — APPOINTMENT (OUTPATIENT)
Dept: PHYSICAL THERAPY | Facility: CLINIC | Age: 47
DRG: 917 | End: 2017-12-29
Attending: SURGERY
Payer: COMMERCIAL

## 2017-12-29 ENCOUNTER — APPOINTMENT (OUTPATIENT)
Dept: OCCUPATIONAL THERAPY | Facility: CLINIC | Age: 47
DRG: 917 | End: 2017-12-29
Attending: SURGERY
Payer: COMMERCIAL

## 2017-12-29 LAB
ALBUMIN SERPL-MCNC: 2.2 G/DL (ref 3.4–5)
ALP SERPL-CCNC: 930 U/L (ref 40–150)
ALT SERPL W P-5'-P-CCNC: 126 U/L (ref 0–50)
ANION GAP SERPL CALCULATED.3IONS-SCNC: 6 MMOL/L (ref 3–14)
AST SERPL W P-5'-P-CCNC: 106 U/L (ref 0–45)
BASOPHILS # BLD AUTO: 0 10E9/L (ref 0–0.2)
BASOPHILS NFR BLD AUTO: 0.1 %
BILIRUB SERPL-MCNC: 2.1 MG/DL (ref 0.2–1.3)
BUN SERPL-MCNC: 16 MG/DL (ref 7–30)
CALCIUM SERPL-MCNC: 8.4 MG/DL (ref 8.5–10.1)
CHLORIDE SERPL-SCNC: 104 MMOL/L (ref 94–109)
CO2 SERPL-SCNC: 27 MMOL/L (ref 20–32)
CREAT SERPL-MCNC: 0.63 MG/DL (ref 0.52–1.04)
CRP SERPL-MCNC: 14 MG/L (ref 0–8)
DIFFERENTIAL METHOD BLD: ABNORMAL
EOSINOPHIL # BLD AUTO: 0.7 10E9/L (ref 0–0.7)
EOSINOPHIL NFR BLD AUTO: 3.8 %
ERYTHROCYTE [DISTWIDTH] IN BLOOD BY AUTOMATED COUNT: 19.8 % (ref 10–15)
GFR SERPL CREATININE-BSD FRML MDRD: >90 ML/MIN/1.7M2
GLUCOSE BLDC GLUCOMTR-MCNC: 106 MG/DL (ref 70–99)
GLUCOSE BLDC GLUCOMTR-MCNC: 107 MG/DL (ref 70–99)
GLUCOSE BLDC GLUCOMTR-MCNC: 146 MG/DL (ref 70–99)
GLUCOSE BLDC GLUCOMTR-MCNC: 96 MG/DL (ref 70–99)
GLUCOSE SERPL-MCNC: 90 MG/DL (ref 70–99)
HCT VFR BLD AUTO: 27.3 % (ref 35–47)
HGB BLD-MCNC: 8.5 G/DL (ref 11.7–15.7)
IMM GRANULOCYTES # BLD: 0.5 10E9/L (ref 0–0.4)
IMM GRANULOCYTES NFR BLD: 2.9 %
LYMPHOCYTES # BLD AUTO: 3.3 10E9/L (ref 0.8–5.3)
LYMPHOCYTES NFR BLD AUTO: 19 %
MCH RBC QN AUTO: 31.6 PG (ref 26.5–33)
MCHC RBC AUTO-ENTMCNC: 31.1 G/DL (ref 31.5–36.5)
MCV RBC AUTO: 102 FL (ref 78–100)
MITOCHONDRIA M2 IGG SER-ACNC: <1 U/ML
MONOCYTES # BLD AUTO: 1.7 10E9/L (ref 0–1.3)
MONOCYTES NFR BLD AUTO: 9.7 %
NEUTROPHILS # BLD AUTO: 11.1 10E9/L (ref 1.6–8.3)
NEUTROPHILS NFR BLD AUTO: 64.5 %
NRBC # BLD AUTO: 0 10*3/UL
NRBC BLD AUTO-RTO: 0 /100
PLATELET # BLD AUTO: 358 10E9/L (ref 150–450)
POTASSIUM SERPL-SCNC: 4.1 MMOL/L (ref 3.4–5.3)
PROT SERPL-MCNC: 5.7 G/DL (ref 6.8–8.8)
RBC # BLD AUTO: 2.69 10E12/L (ref 3.8–5.2)
SMA IGG SER-ACNC: 7 UNITS (ref 0–19)
SODIUM SERPL-SCNC: 138 MMOL/L (ref 133–144)
WBC # BLD AUTO: 17.2 10E9/L (ref 4–11)

## 2017-12-29 PROCEDURE — 25000132 ZZH RX MED GY IP 250 OP 250 PS 637

## 2017-12-29 PROCEDURE — 85025 COMPLETE CBC W/AUTO DIFF WBC: CPT | Performed by: INTERNAL MEDICINE

## 2017-12-29 PROCEDURE — 99233 SBSQ HOSP IP/OBS HIGH 50: CPT | Performed by: INTERNAL MEDICINE

## 2017-12-29 PROCEDURE — 40000133 ZZH STATISTIC OT WARD VISIT: Performed by: OCCUPATIONAL THERAPIST

## 2017-12-29 PROCEDURE — 97535 SELF CARE MNGMENT TRAINING: CPT | Mod: GO | Performed by: OCCUPATIONAL THERAPIST

## 2017-12-29 PROCEDURE — 25000128 H RX IP 250 OP 636: Performed by: INTERNAL MEDICINE

## 2017-12-29 PROCEDURE — 97110 THERAPEUTIC EXERCISES: CPT | Mod: GO | Performed by: OCCUPATIONAL THERAPIST

## 2017-12-29 PROCEDURE — 40000193 ZZH STATISTIC PT WARD VISIT

## 2017-12-29 PROCEDURE — 99207 ZZC APP CREDIT; MD BILLING SHARED VISIT: CPT | Performed by: PHYSICIAN ASSISTANT

## 2017-12-29 PROCEDURE — 25000132 ZZH RX MED GY IP 250 OP 250 PS 637: Performed by: INTERNAL MEDICINE

## 2017-12-29 PROCEDURE — 12000001 ZZH R&B MED SURG/OB UMMC

## 2017-12-29 PROCEDURE — 00000146 ZZHCL STATISTIC GLUCOSE BY METER IP

## 2017-12-29 PROCEDURE — 80053 COMPREHEN METABOLIC PANEL: CPT | Performed by: INTERNAL MEDICINE

## 2017-12-29 PROCEDURE — 97116 GAIT TRAINING THERAPY: CPT | Mod: GP

## 2017-12-29 PROCEDURE — 97530 THERAPEUTIC ACTIVITIES: CPT | Mod: GP

## 2017-12-29 PROCEDURE — 25000132 ZZH RX MED GY IP 250 OP 250 PS 637: Performed by: PHYSICIAN ASSISTANT

## 2017-12-29 PROCEDURE — 86140 C-REACTIVE PROTEIN: CPT | Performed by: INTERNAL MEDICINE

## 2017-12-29 PROCEDURE — 25000132 ZZH RX MED GY IP 250 OP 250 PS 637: Performed by: STUDENT IN AN ORGANIZED HEALTH CARE EDUCATION/TRAINING PROGRAM

## 2017-12-29 RX ORDER — CLONIDINE HYDROCHLORIDE 0.1 MG/1
0.1 TABLET ORAL 3 TIMES DAILY
Status: DISCONTINUED | OUTPATIENT
Start: 2017-12-29 | End: 2018-01-05 | Stop reason: HOSPADM

## 2017-12-29 RX ORDER — OXYCODONE HYDROCHLORIDE 5 MG/1
5 TABLET ORAL EVERY 8 HOURS
Status: DISCONTINUED | OUTPATIENT
Start: 2017-12-29 | End: 2017-12-30

## 2017-12-29 RX ORDER — IBUPROFEN 200 MG
400-600 TABLET ORAL EVERY 8 HOURS PRN
Status: DISCONTINUED | OUTPATIENT
Start: 2017-12-29 | End: 2018-01-05 | Stop reason: HOSPADM

## 2017-12-29 RX ORDER — CARVEDILOL 6.25 MG/1
6.25 TABLET ORAL 2 TIMES DAILY
Status: DISCONTINUED | OUTPATIENT
Start: 2017-12-29 | End: 2018-01-05 | Stop reason: HOSPADM

## 2017-12-29 RX ADMIN — Medication 0.1 MG: at 08:33

## 2017-12-29 RX ADMIN — OLANZAPINE 5 MG: 5 TABLET, ORALLY DISINTEGRATING ORAL at 08:25

## 2017-12-29 RX ADMIN — OXYCODONE HYDROCHLORIDE 5 MG: 5 TABLET ORAL at 03:43

## 2017-12-29 RX ADMIN — IBUPROFEN 600 MG: 200 TABLET, FILM COATED ORAL at 17:44

## 2017-12-29 RX ADMIN — FOLIC ACID 1 MG: 1 TABLET ORAL at 08:25

## 2017-12-29 RX ADMIN — LEVOTHYROXINE SODIUM 50 MCG: 25 TABLET ORAL at 08:25

## 2017-12-29 RX ADMIN — SODIUM CHLORIDE, PRESERVATIVE FREE 10 ML: 5 INJECTION INTRAVENOUS at 09:33

## 2017-12-29 RX ADMIN — PANTOPRAZOLE SODIUM 40 MG: 40 TABLET, DELAYED RELEASE ORAL at 08:25

## 2017-12-29 RX ADMIN — OXYCODONE HYDROCHLORIDE 5 MG: 5 TABLET ORAL at 17:26

## 2017-12-29 RX ADMIN — LORAZEPAM 2 MG: 1 TABLET ORAL at 09:31

## 2017-12-29 RX ADMIN — CARVEDILOL 6.25 MG: 25 TABLET, FILM COATED ORAL at 08:33

## 2017-12-29 RX ADMIN — Medication 125 MG: at 17:12

## 2017-12-29 RX ADMIN — OXYCODONE HYDROCHLORIDE 5 MG: 5 TABLET ORAL at 09:32

## 2017-12-29 RX ADMIN — ENOXAPARIN SODIUM 40 MG: 40 INJECTION SUBCUTANEOUS at 12:07

## 2017-12-29 RX ADMIN — Medication 100 MG: at 08:25

## 2017-12-29 RX ADMIN — Medication 125 MG: at 19:50

## 2017-12-29 RX ADMIN — LORAZEPAM 2 MG: 1 TABLET ORAL at 19:50

## 2017-12-29 RX ADMIN — CARVEDILOL 6.25 MG: 6.25 TABLET, FILM COATED ORAL at 19:51

## 2017-12-29 RX ADMIN — Medication 3 MG: at 08:30

## 2017-12-29 RX ADMIN — Medication 3 MG: at 13:30

## 2017-12-29 RX ADMIN — OLANZAPINE 5 MG: 5 TABLET, ORALLY DISINTEGRATING ORAL at 19:50

## 2017-12-29 RX ADMIN — CLONIDINE HYDROCHLORIDE 0.1 MG: 0.1 TABLET ORAL at 19:51

## 2017-12-29 RX ADMIN — MIRTAZAPINE 15 MG: 15 TABLET, FILM COATED ORAL at 21:06

## 2017-12-29 RX ADMIN — CLONIDINE HYDROCHLORIDE 0.1 MG: 0.1 TABLET ORAL at 13:30

## 2017-12-29 ASSESSMENT — PAIN DESCRIPTION - DESCRIPTORS: DESCRIPTORS: ACHING

## 2017-12-29 NOTE — PLAN OF CARE
Problem: Patient Care Overview  Goal: Plan of Care/Patient Progress Review  Discharge Planner OT   Patient plan for discharge: TCU  Current status: Pt mod to max assist for all ADL's requiring sitting EOB or standing. Pt completing sponge bath sitting up in chair with mod assist  Barriers to return to prior living situation: deconditioning and cognitive deficits.   Recommendations for discharge: TCU with potential for ARU pending progress in therapies.   Rationale for recommendations: pt with significant deconditioning and will require extensive rehab to regain functional strength and endurance.        Entered by: Dharmesh Root 12/29/2017 10:47 AM

## 2017-12-29 NOTE — PLAN OF CARE
Problem: Patient Care Overview  Goal: Plan of Care/Patient Progress Review  PT 4C  Discharge Planner PT   Patient plan for discharge: Pt would like to d/c home if possible.  Current status: Pt transferred OOB and sit<>stand x 5 with min/mod A x1 person. Amb 4 short bouts in hallway (6'-12') with walker. Limited by SOB, dizziness, fatigue and possibly anxiety component. Seated rest breaks taken and pt very motivated.  Barriers to return to prior living situation: Deconditioning, fall risk  Recommendations for discharge: ARC  Rationale for recommendations: Rehab trajectory, motivation       Entered by: Raymon Beckwith 12/29/2017 5:16 PM

## 2017-12-29 NOTE — PROGRESS NOTES
[facility] Nurse Inpatient Adult Pressure InjuryAssessment    Followup Assessment  Reason for consultation: Evaluate and treat multiple pressure injuries    Assessment:   Left cheek wound due to Pressure Injury  Pressure Injury appears to be a deep tissue injury. Expecting this wound to evolve and will be monitored closely. Present on admission No  This is a Medical Device Related Pressure Injury (MDRPI) due to ETT securement device    Right cheek wound due to Pressure Injury  Pressure Injury is Stage 2.  Present on admission No  This is a Medical Device Related Pressure Injury (MDRPI) due to ETT securement device    Left proximal forearm wound due to Pressure Injury  Pressure Injury is unstageable  Present on admission No  This is a Medical Device Related Pressure Injury (MDRPI) due to IV hub    Left distal forearm wound due to Pressure Injury  Pressure Injury is Stage 2. Present on admission No  This is a Medical Device Related Pressure Injury (MDRPI) due to IV cap.       Contributing factor of the pressure injury: immobility, proning (no longer being proned)  Status: follow up assessment, evolving    Photo: see individual wound assessments below    TREATMENT  PLAN    Bilateral cheek wounds: Wash twice daily with saline and gauze. Gently rub Vaseline onto wounds to keep moist. If patient is to be proned in the future, please pad cheeks with Mepilex and reposition head every hour.    Left forearm (at AC): wash every other day with wound cleanser and gauze. Cover with Mepilex.    Orders Reviewed  WO Nurse follow-up plan:twice weekly  Nursing to notify the Provider(s) and re-consult the [facility] Nurse if wound(s) deteriorates or new skin concern.    Patient History  According to provider note(s):    Ana Laura Wharton is a 48 yo female with hx of bipolar disorder with depression, polysubstance abuse, & prior suicide attempts, admitted to [facility] MICU  Following calcium channel blocker (CCB) overdose, tylenol overdose & alcohol  overdose. Course complicated by lactic acidosis, hypoventilation, acute renal failure, & severe hypotension/vasogenic shock. Currently intubated, sedated, & proned.      Patient proned for lung recruitment last from 17 at 1315 to 17 at 0520 (per nursing notes). On 17 ET tabs changed and pressure injury noted under old tabs. WOC consult placed. Left forearm pressure injury noted under IV during WOC skin assessment.    Objective Data     Current Diet/ Nutrition:    Active Diet Order      Regular Diet Adult    Output:   I/O last 3 completed shifts:  In: 938.75 [P.O.:720; I.V.:218.75]  Out: -     Skin Assessment: Sujit Sujit Score  Av  Min: 8  Max: 20                                Sujit Q No Data Recorded                     NSRAS No Data Recorded     Labs:   Recent Labs  Lab 17  0317  17  2103   HGB 8.5*  < >  --    INR  --   --  1.09   WBC 17.2*  < >  --    CRP 14.0*  < >  --    < > = values in this interval not displayed.  No lab results found in last 7 days.    Physical Exam    Skin assessment:   Focused skin inspection: anterior body    Wound Location:  Left cheek  17 Left cheek17     Wound History: see history above.  Measurements (length x width x depth, in cm) 2 cm x 4 cm  x  0 cm   Wound Base: loose layer of devitalized epithelium with visible dark purple tissue on posterior side of wound, no surrounding erythema  Palpation of the wound bed: firm   Periwound skin: intact  Color: normal and consistent with surrounding tissue  Temperature: normal   Drainage: none  Description of drainage: none  Odor: none  Pain:none       Wound Location:  Right cheek        17 Right cheek    Wound History: see history above  Measurements (length x width x depth, in cm) 0.4 cm x 0.9 cm  x  0 cm   Wound Base:  Dry drainage scab  Palpation of the wound bed: firm   Periwound skin: intact  Color: normal and consistent with surrounding tissue  Temperature: normal   Drainage:  none  Description of drainage: none  Odor: none  Pain: none    Wound Location:  Left proximal forearm        12/21/17 left forearm  Wound assessed with 2nd WOC: Michelle BHATIA  Wound History: Noted during skin assessment. Wound was found under hub of peripheral IV site  Measurements (length x width x depth, in cm) 1.7 cm x 0.5 cm  x  0.4 cm   Wound Base:  70% Black leathery eschar, 30% pale pink/yellow fibrin   Palpation of the wound bed: induration  Periwound skin: up to 2 cm of pink erythema   Temperature: normal   Drainage: none  Description of drainage: none  Odor: none  Pain:none    Wound Location:  Left distal forearm  (see picture above)  12/21/17 left forearm     Wound History: Noted during skin assessment. Wound was found under hub of peripheral IV site  Measurements (length x width x depth, in cm) 0.5 cm x 0.4 cm  x  0.01 cm   Wound Base: resurfaced epidermis  Palpation of the wound bed: normal   Periwound skin: intact  Color: normal and consistent with surrounding tissue  Temperature: normal   Drainage: none  Description of drainage: none  Odor: none  Pain: none      Interventions  Current support surface: Standard  Low air loss    Current off-loading measures: Pillows under calves  Visual inspection of wound(s) completed  Wound Care:was done per plan of care    Cleansing with saline solution  Supplies: Placed at Bedside    Discussed plan of care with Nurse    Face to face time: 20 minutes

## 2017-12-29 NOTE — PLAN OF CARE
Problem: Overdose, Ingestion/Inhalants (Adult)  Goal: Signs and Symptoms of Listed Potential Problems Will be Absent, Minimized or Managed (Overdose, Ingestion/Inhalants)  Signs and symptoms of listed potential problems will be absent, minimized or managed by discharge/transition of care (reference Overdose, Ingestion/Inhalants (Adult) CPG).   Outcome: No Change  Afebrile, SR 80-90s. BP hypertensive at times, improved with scheduled BP meds. Sat mid 90s on 2 LPM nc, can be weaned off when awake. Continue scheduled oxycodone, pain/discomfort well controlled. Ativan once at bedtime for anxiety/sleep. Resting comfortably, cooperative with cares, pleasant upbeat mood. Expressed disappointed as unable to d/c home as soon as she would like. Taking sips of fluids, feels hungry and requests food then unable to eat. Larry counts continued. Incontinent of stool/urine. Calling for assistance to clean up brief. Continue with current plan of care.

## 2017-12-29 NOTE — PROGRESS NOTES
Calorie Counts    Intake recorded for: 12/28 Kcals: 278 Protein: 5g    # meals recorded: 2 meals (First: 25% blueberry muffin, rice chex w/ milk, apple juice)      (Second: 25% tomato soup, potatoes w/ gravy, 3 apple juice)    # supplements recorded: 0

## 2017-12-29 NOTE — PROGRESS NOTES
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 ICU course c/b lactic acidosis, acute hypoxic/hypercarbic respiratory failure 2/2 aspiration requiring intubation c/b ARDS, ARF, severe hypotension/vasogenic shock requiring pressors, paroxysmal A.fib, intermittent SVT, and sepsis 2/2 sinusitis. Patient extubated 12/24/17, transferred to medicine 12/25 for ongoing care.     Intentional overdose 12/13 with bipolar disorder, depression, anxiety, etoh abuse, possible benzodiazapine w/d: Overdose with amlodipine, Tylenol, Nyquil and Etoh. PTA drinking 16 oz vodka daily for 1.5 days. Treated with Ca gtt, high dose insulin, and NAC per poison control. Extubated 12/24. Sitter discontinued 12/28 at psych recs. Will likely d/c to inpatient psychiatry vs TCU.  - Suicide precautions   - Continue scheduled ativan, Remeron, olanzapine    - Daily thiamine, folic acid, MVI    Diarrhea, recurrent C. Diff: H/o chronic infection s/p failed fecal transplant (8/2016). C.diff +12/22. AXR 12/28 no acute findings. Currently on PO Vancomycin with ongoing high volume of stools. Patient prefers Fidaxomicin over PO vanc if possible. WBC 17.2 (18.6). CRP 14. Per GI, uncommon that one would have vanc resistant c diff, thus concerning for other etiology such as pseudomembranous colitis.   - GI following  - Will await final GI recs, but would need ID consult if consider Fidaxomicin   - Continue PO vancomycin QID through 1/1/18 with possibly 10 days Fidaxomicin following   - Consider colonoscopy with bx if not getting better    Acute pancreatitis,  transaminitis: Lipase 1775 12/26. Previously normal. US no stones. MRCP normal 12/27.  (1017), Tbili 2.1 (2.7). ALT, AST stable. Diffuse abdominal tenderness. GI following, transaminitis thought to be 2/2 cholestasis of sepsis and critical illness. Tolerating diet as of 12/29  - GI following  - Allow for regular diet, would stop and give IVF if acute worsening    ARDS 2/2 aspiration, acute hypoxic/hypercarbic respiratory failure: Initially d/t overdose, V/Q mismatch in setting of ARDS. S/p intubation (initially prone) flolan, solumedrol, diuretics, and Duobneb. Extubated 12/24. Completed steroids 12/28. VSS, intermittent supplemental O2 up to 2L.   - Repeat CXR if new dyspnea   - Acapella, supplemental O2, prn Duonebs     Likely acute opiate withdrawal: S/s of opiate withdrawal noted 12/27, likely 2/2 high fentanyl dosing during intubation (chills, myalgia, diarrhea, daihproesis, abdominal pain). Although several symptoms can be connected to other etiology (ex. Diarrhea and c diff, abdominal pain and pancreatitis). Started on scheduled opiates 12/27 with improvement  - Decrease oxycodone to q8h scheduled, will likely decrease to bid 12/30    Altered mental status: Improving. Waxing and waning mental clarity. A&O x3, however, reports of hallucinations and inappropriate comments. Lactic acid normal. Likely multifactorial including rapid cycling bipolar with psychotic features, possible drug-induced psychosis, ongoing c diff, and possible hospital acquired delirium    Severe malnutrition: Cleared for regular diet, although oral intake has been poor. Prealbumin 25 12/28. Very limited PO intake, patient reports due to high volume BM. Would like to avoid NJ  - Nutrition following  - Continue calorie counts, consider NJ over the weekend if <50% meals eaten    HTN: PTA on Norvasc. Currently on Hydralazine, clonidine and Coreg. BP stable.   - Continue Hydralazine, clonidine and Coreg with hold parameters   - Labetalol  prn for SBP>175 or DBP>110    Pressure injury: Not present on admission. Bilateral cheek and left forearm  - WOCN following     Resolved Hospital Problems and Stable, Chronic Medical Conditions:  Hypernatremia: Elevated from admission to 12/25, normalized 12/26. Thought 2/2 hypovolemia.   ARF: 2/2 CCB overdose. Cr peak 1.62. C/b volume overload and pulmonary edema, treated with IV lasix.  SVT: Presented with HR 190s, resolved with IVF  Paroxysmal a fib: Brief episode of a fib 12/20 with conversion to NSR with metoprolol x2. No further episodes. On Coreg as above   Normocytic anemia: Hgb 10.4 on admit. Drop to 5.8 12/19, s/p 1U RBC. Smear with marked normochromic, normocytic anemia, very rare RBC fragments and spherocytes. Haptoglobin 63, . Concern for possible alveolar hemorrhage as above. Possible 2/2 chronic Etoh use. Hgb now stable. Continue Folic acid  Coagulopathy: In the setting of etoh use, poor nutrition. S/p vitamin K. No s/s of acute bleeding. Normalized 12/27  Hypothyroidism: TSH 5.06, T4 normal. Continue PTA synthroid  - Recheck in 6 weeks  Incidental MRCP findings: Abnormal soft tissue posterior to the fundus of the stome, similar to 1/2017 CT  - Follow up OP for biopsy       Diet: Regular Diet Adult  Calorie Counts  Snacks/Supplements Adult: Between Meals  Fluids: TKO  DVT Prophylaxis: Enoxaparin (Lovenox) SQ  Code Status: Full Code    Disposition Plan   Expected discharge: 3-5 days, recommended to TCU vs inpatient psychiatry once physically stronger, medically stable, disposition plan determined.     Entered: Nehal Munson 12/29/2017, 3:52 PM   Information in the above section will display in the discharge planner report.      The patient's care was discussed with the patient, nursing, care coordination, and attending physician, Dr. Triston Munson  Internal Medicine Staff Hospitalist Service  [facility]  Pager: [phone]  Please see sticky note for  cross cover information    Interval History   Feeling stronger today. Had good OT session. Abdominal pain stable to slightly improved. Not eating much due to concern for BMs with food. Would very much like to transition to Fidaxomicin rather than PO vanco given past results. Long discussion regarding NJ placement, would like to trial foods over the weekend and place in a few days if needed. Denies confusion. No dyspnea or chest pain at rest. Denies urinary symptoms     Data reviewed today: I reviewed all medications, new labs and imaging results over the last 24 hours. I personally reviewed pertinent notes and imaging     Physical Exam   Vital Signs: Temp: 98.7  F (37.1  C) Temp src: Oral BP: 132/85   Heart Rate: 78 Resp: 30 SpO2: 96 % O2 Device: None (Room air) Oxygen Delivery: 2.5 LPM  Weight: 113 lbs 8.59 oz  General Appearance: Alert and oriented x3. Appears comfortable  Respiratory: CATB without wheezing, crackles, or rales  Cardiovascular: RRR without murmur. S1, S2  GI: Abdomen soft, diffuse moderate tender, active bowel sounds. No guarding or rebound   Skin: No rash or jaundice noted. LUE with bandage covering part of forearm and left cheek   Other: Trace BLE peripheral edema, distal pulses palpable. Moves all extremities         Data   Data     Recent Labs  Lab 12/29/17  0317 12/28/17  0306 12/27/17  2103 12/27/17  1004 12/26/17  0520   WBC 17.2* 18.6*  --  22.8* 20.6*   HGB 8.5* 8.4*  --  9.6* 8.9*   * 101*  --  100 97    345  --  363 299   INR  --   --  1.09  --   --     141  --  138 142   POTASSIUM 4.1 4.2  --  4.6 3.4   CHLORIDE 104 107  --  105 104   CO2 27 26  --  24 28   BUN 16 19  --  26 42*   CR 0.63 0.59  --  0.60 0.73   ANIONGAP 6 8  --  8 10   ISAURO 8.4* 8.2*  --  8.5 7.8*   GLC 90 73  --  219* 127*   ALBUMIN 2.2* 2.3*  --  2.5* 2.3*   PROTTOTAL 5.7* 5.6*  --  6.1* 5.2*   BILITOTAL 2.1* 2.7*  --  4.1* 4.1*   ALKPHOS 930* 1017*  --  1143* 966*   * 163*  --  166* 148*    * 164*  --  190* 194*   LIPASE  --   --   --   --  1775*     No results found for this or any previous visit (from the past 24 hour(s)).

## 2017-12-30 ENCOUNTER — APPOINTMENT (OUTPATIENT)
Dept: OCCUPATIONAL THERAPY | Facility: CLINIC | Age: 47
DRG: 917 | End: 2017-12-30
Attending: SURGERY
Payer: COMMERCIAL

## 2017-12-30 LAB
ALBUMIN UR-MCNC: NEGATIVE MG/DL
APPEARANCE UR: ABNORMAL
BACTERIA #/AREA URNS HPF: ABNORMAL /HPF
BILIRUB UR QL STRIP: ABNORMAL
COLOR UR AUTO: YELLOW
GLUCOSE BLDC GLUCOMTR-MCNC: 102 MG/DL (ref 70–99)
GLUCOSE BLDC GLUCOMTR-MCNC: 115 MG/DL (ref 70–99)
GLUCOSE BLDC GLUCOMTR-MCNC: 121 MG/DL (ref 70–99)
GLUCOSE BLDC GLUCOMTR-MCNC: 156 MG/DL (ref 70–99)
GLUCOSE BLDC GLUCOMTR-MCNC: 160 MG/DL (ref 70–99)
GLUCOSE BLDC GLUCOMTR-MCNC: 163 MG/DL (ref 70–99)
GLUCOSE UR STRIP-MCNC: NEGATIVE MG/DL
HGB UR QL STRIP: ABNORMAL
KETONES UR STRIP-MCNC: NEGATIVE MG/DL
LEUKOCYTE ESTERASE UR QL STRIP: ABNORMAL
MAGNESIUM SERPL-MCNC: 1.9 MG/DL (ref 1.6–2.3)
MUCOUS THREADS #/AREA URNS LPF: PRESENT /LPF
NITRATE UR QL: NEGATIVE
PH UR STRIP: 5.5 PH (ref 5–7)
PHOSPHATE SERPL-MCNC: 3.7 MG/DL (ref 2.5–4.5)
POTASSIUM SERPL-SCNC: 3.8 MMOL/L (ref 3.4–5.3)
RBC #/AREA URNS AUTO: 9 /HPF (ref 0–2)
SOURCE: ABNORMAL
SP GR UR STRIP: 1.01 (ref 1–1.03)
SQUAMOUS #/AREA URNS AUTO: 3 /HPF (ref 0–1)
TRANS CELLS #/AREA URNS HPF: <1 /HPF (ref 0–1)
UROBILINOGEN UR STRIP-MCNC: NORMAL MG/DL (ref 0–2)
WBC #/AREA URNS AUTO: 10 /HPF (ref 0–2)

## 2017-12-30 PROCEDURE — 81003 URINALYSIS AUTO W/O SCOPE: CPT | Performed by: INTERNAL MEDICINE

## 2017-12-30 PROCEDURE — 00000146 ZZHCL STATISTIC GLUCOSE BY METER IP

## 2017-12-30 PROCEDURE — 87106 FUNGI IDENTIFICATION YEAST: CPT | Performed by: SURGERY

## 2017-12-30 PROCEDURE — 12000001 ZZH R&B MED SURG/OB UMMC

## 2017-12-30 PROCEDURE — 36592 COLLECT BLOOD FROM PICC: CPT | Performed by: STUDENT IN AN ORGANIZED HEALTH CARE EDUCATION/TRAINING PROGRAM

## 2017-12-30 PROCEDURE — 25000132 ZZH RX MED GY IP 250 OP 250 PS 637: Performed by: INTERNAL MEDICINE

## 2017-12-30 PROCEDURE — 84100 ASSAY OF PHOSPHORUS: CPT | Performed by: INTERNAL MEDICINE

## 2017-12-30 PROCEDURE — 25000132 ZZH RX MED GY IP 250 OP 250 PS 637: Performed by: STUDENT IN AN ORGANIZED HEALTH CARE EDUCATION/TRAINING PROGRAM

## 2017-12-30 PROCEDURE — 25000132 ZZH RX MED GY IP 250 OP 250 PS 637: Performed by: PHYSICIAN ASSISTANT

## 2017-12-30 PROCEDURE — 84132 ASSAY OF SERUM POTASSIUM: CPT | Performed by: INTERNAL MEDICINE

## 2017-12-30 PROCEDURE — 40000802 ZZH SITE CHECK

## 2017-12-30 PROCEDURE — 25000125 ZZHC RX 250: Performed by: STUDENT IN AN ORGANIZED HEALTH CARE EDUCATION/TRAINING PROGRAM

## 2017-12-30 PROCEDURE — 99207 ZZC APP CREDIT; MD BILLING SHARED VISIT: CPT | Performed by: PHYSICIAN ASSISTANT

## 2017-12-30 PROCEDURE — 99232 SBSQ HOSP IP/OBS MODERATE 35: CPT | Performed by: INTERNAL MEDICINE

## 2017-12-30 PROCEDURE — 40000133 ZZH STATISTIC OT WARD VISIT

## 2017-12-30 PROCEDURE — 87086 URINE CULTURE/COLONY COUNT: CPT | Performed by: SURGERY

## 2017-12-30 PROCEDURE — 25000132 ZZH RX MED GY IP 250 OP 250 PS 637

## 2017-12-30 PROCEDURE — 97535 SELF CARE MNGMENT TRAINING: CPT | Mod: GO

## 2017-12-30 PROCEDURE — 25000128 H RX IP 250 OP 636: Performed by: INTERNAL MEDICINE

## 2017-12-30 PROCEDURE — 83735 ASSAY OF MAGNESIUM: CPT | Performed by: STUDENT IN AN ORGANIZED HEALTH CARE EDUCATION/TRAINING PROGRAM

## 2017-12-30 PROCEDURE — 25000125 ZZHC RX 250: Performed by: INTERNAL MEDICINE

## 2017-12-30 RX ORDER — OXYCODONE HYDROCHLORIDE 5 MG/1
5 TABLET ORAL 2 TIMES DAILY
Status: DISCONTINUED | OUTPATIENT
Start: 2017-12-30 | End: 2017-12-31

## 2017-12-30 RX ORDER — NITROFURANTOIN 25; 75 MG/1; MG/1
100 CAPSULE ORAL EVERY 12 HOURS SCHEDULED
Status: DISCONTINUED | OUTPATIENT
Start: 2017-12-30 | End: 2018-01-02

## 2017-12-30 RX ADMIN — CLONIDINE HYDROCHLORIDE 0.1 MG: 0.1 TABLET ORAL at 09:35

## 2017-12-30 RX ADMIN — NITROFURANTOIN MONOHYDRATE/MACROCRYSTALLINE 100 MG: 25; 75 CAPSULE ORAL at 19:12

## 2017-12-30 RX ADMIN — Medication 125 MG: at 09:38

## 2017-12-30 RX ADMIN — CARVEDILOL 6.25 MG: 6.25 TABLET, FILM COATED ORAL at 19:12

## 2017-12-30 RX ADMIN — OXYCODONE HYDROCHLORIDE 5 MG: 5 TABLET ORAL at 19:12

## 2017-12-30 RX ADMIN — MIRTAZAPINE 15 MG: 15 TABLET, FILM COATED ORAL at 20:27

## 2017-12-30 RX ADMIN — Medication 125 MG: at 12:31

## 2017-12-30 RX ADMIN — Medication 2 G: at 05:03

## 2017-12-30 RX ADMIN — LORAZEPAM 1 MG: 1 TABLET ORAL at 16:33

## 2017-12-30 RX ADMIN — LORAZEPAM 1 MG: 1 TABLET ORAL at 20:27

## 2017-12-30 RX ADMIN — LORAZEPAM 2 MG: 1 TABLET ORAL at 01:31

## 2017-12-30 RX ADMIN — CLONIDINE HYDROCHLORIDE 0.1 MG: 0.1 TABLET ORAL at 19:13

## 2017-12-30 RX ADMIN — LEVOTHYROXINE SODIUM 50 MCG: 25 TABLET ORAL at 09:35

## 2017-12-30 RX ADMIN — Medication 125 MG: at 19:12

## 2017-12-30 RX ADMIN — FOLIC ACID 1 MG: 1 TABLET ORAL at 09:34

## 2017-12-30 RX ADMIN — NITROFURANTOIN MONOHYDRATE/MACROCRYSTALLINE 100 MG: 25; 75 CAPSULE ORAL at 13:24

## 2017-12-30 RX ADMIN — CARVEDILOL 6.25 MG: 6.25 TABLET, FILM COATED ORAL at 09:38

## 2017-12-30 RX ADMIN — OLANZAPINE 5 MG: 5 TABLET, ORALLY DISINTEGRATING ORAL at 19:14

## 2017-12-30 RX ADMIN — IBUPROFEN 600 MG: 200 TABLET, FILM COATED ORAL at 17:28

## 2017-12-30 RX ADMIN — Medication 125 MG: at 16:23

## 2017-12-30 RX ADMIN — ENOXAPARIN SODIUM 40 MG: 40 INJECTION SUBCUTANEOUS at 12:31

## 2017-12-30 RX ADMIN — ONDANSETRON HYDROCHLORIDE 4 MG: 4 SOLUTION ORAL at 03:55

## 2017-12-30 RX ADMIN — OXYCODONE HYDROCHLORIDE 5 MG: 5 TABLET ORAL at 09:39

## 2017-12-30 RX ADMIN — OLANZAPINE 5 MG: 5 TABLET, ORALLY DISINTEGRATING ORAL at 09:36

## 2017-12-30 RX ADMIN — Medication 100 MG: at 09:37

## 2017-12-30 RX ADMIN — CLONIDINE HYDROCHLORIDE 0.1 MG: 0.1 TABLET ORAL at 13:23

## 2017-12-30 RX ADMIN — PANTOPRAZOLE SODIUM 40 MG: 40 TABLET, DELAYED RELEASE ORAL at 09:35

## 2017-12-30 RX ADMIN — SODIUM CHLORIDE, PRESERVATIVE FREE 5 ML: 5 INJECTION INTRAVENOUS at 09:40

## 2017-12-30 RX ADMIN — OXYCODONE HYDROCHLORIDE 5 MG: 5 TABLET ORAL at 01:31

## 2017-12-30 RX ADMIN — LORAZEPAM 2 MG: 1 TABLET ORAL at 10:19

## 2017-12-30 NOTE — PLAN OF CARE
Problem: Patient Care Overview  Goal: Plan of Care/Patient Progress Review  Pt transferred to  for continuation of care, belongings sent w/ pt. Report given to 5A nurse.

## 2017-12-30 NOTE — PLAN OF CARE
Problem: Patient Care Overview  Goal: Plan of Care/Patient Progress Review  5A PT: Attempted to see patient in PM. Patient poor to arouse and declined PT second to fatigue even after reviewing benefits of PT. Cx.

## 2017-12-30 NOTE — PROVIDER NOTIFICATION
Pt c/o UTI, states that she thinks she has one and would like her urine tested.  Urine was collected and provider notified.  Awaiting orders.  Will continue to monitor.

## 2017-12-30 NOTE — PROGRESS NOTES
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 ICU course c/b lactic acidosis, acute hypoxic/hypercarbic respiratory failure 2/2 aspiration requiring intubation c/b ARDS, ARF, severe hypotension/vasogenic shock requiring pressors, paroxysmal A.fib, intermittent SVT, and sepsis 2/2 sinusitis. Patient extubated 12/24/17, transferred to medicine 12/25 for ongoing care.      Intentional overdose 12/13 with bipolar disorder, depression, anxiety, etoh abuse, possible benzodiazapine w/d: Overdose with amlodipine, Tylenol, Nyquil and Etoh. PTA drinking 16 oz vodka daily for 1.5 days. Treated with Ca gtt, high dose insulin, and NAC per poison control. Extubated 12/24. Sitter discontinued 12/28 at psych recs. Will likely d/c to inpatient psychiatry vs TCU pending progress and what facilities will accept patient. Denies active SI at this time.   - Suicide precautions   - Continue scheduled ativan, Remeron, olanzapine    - Daily thiamine, folic acid, MVI  - If patient attempts to leave, will need to place 72 hr hold     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 C.diff +12/22. AXR 12/28 no acute findings. Currently on PO Vancomycin with slow improvement in stooling - first night without stools overnight last night. Patient prefers Fidaxomicin over PO vanc if possible. Per GI, uncommon that one would have vanc resistant c diff, thus concerning for other etiology such as pseudomembranous colitis.   - GI  following, plan to cont vanco po 125mg QID  - Consider colonoscopy with bx if not getting better  - Obtain J+KUB if acute worsening to eval for toxic megacolon     UTI. Patient reports dysuria, increased urinary frequency overnight. UA with WBC, +LE, neg nitrate. UCx pending. Most recent UCx with Enterococcus faecalis pansensitive. Hesitant to start broad spec abx given c.diff as above. Will start macrobid BID. Cont to follow urine culture.      Acute pancreatitis, transaminitis. Lipase 1775 12/26. Previously normal. US no stones. MRCP normal 12/27.  (1017), Tbili 2.1 (2.7). ALT, AST stable. Diffuse abdominal tenderness. GI following, transaminitis thought to be 2/2 cholestasis of sepsis and critical illness. Tolerating diet as of 12/29.  - GI following  - Regular diet as tolerated, give IVF if acutely decompensates  - CMP tomorrow     ARDS 2/2 aspiration, acute hypoxic/hypercarbic respiratory failure. Initially d/t overdose, V/Q mismatch in setting of ARDS. S/p intubation (initially prone) flolan, solumedrol, diuretics, and Duobneb. Extubated 12/24. Completed steroids 12/28. VSS, intermittent supplemental O2 up to 2L. Denies chest pain, sob, or dyspnea. No cough. Afebrile. VSS.   - Obtain CXR if hypoxia worsens  - Acapella, supplemental O2, prn Duonebs      Likely acute opiate withdrawal. Signs and symptoms of opiate withdrawal noted 12/27, likely 2/2 high fentanyl dosing during intubation (chills, myalgia, diarrhea, diaphoresis, abdominal pain). Although several symptoms can be connected to other etiology (ex. Diarrhea and c diff, abdominal pain and pancreatitis). Started on scheduled opiates 12/27 with improvement, now slowly titrating off. Decrease oxycodone to 5mg BID today.      Altered mental status. Near resolved. Waxing and waning mental clarity. A&O x3, however, reports of hallucinations and inappropriate comments (none today). Lactic acid normal. Likely multifactorial including rapid cycling  bipolar with psychotic features, possible drug-induced psychosis, ongoing c diff, and possible hospital acquired delirium.     Severe malnutrition. Cleared for regular diet, although oral intake has been poor. Prealbumin 25 12/28. Very limited PO intake, patient reports due to high volume BM. Would like to avoid NJ at all lengths. PO intake starting to improve after long discussion about TFs. Nutrition following, cont calorie counts. Consider TF if po intake less than 50% of caloric needs.      HTN. PTA on Norvasc. Currently on Hydralazine, clonidine and Coreg. BP normotensive.   - Continue Hydralazine, clonidine and Coreg with hold parameters   - Labetalol prn for SBP>175 or DBP>110     Pressure injury. Not present on admission. Bilateral cheek and left forearm. WOCN following, last seen 12/29 with recs to cleanse with saline solution and continue off loading measures.     Financial concerns. Patient is concerned that her hospitalization wont be covered come the new year d/t insurance change. SW to meet with patient to discuss financial concerns today (previously met with patient 12/28).      Resolved Hospital Problems and Stable, Chronic Medical Conditions:  Hypernatremia. Elevated from admission to 12/25, normalized 12/26. Thought 2/2 hypovolemia.  ARF. 2/2 CCB overdose. Cr peak 1.62. C/b volume overload and pulmonary edema, treated with IV lasix. Cr since normalized.   SVT. Presented with HR 190s in setting of critical illness following OD, resolved with IVF.   Paroxysmal a fib. Brief episode of a fib 12/20 with conversion to NSR with metoprolol x2. No further episodes. On Coreg as above   Normocytic anemia. Hgb 10.4 on admit. Drop to 5.8 12/19, s/p 1U PRBC. Smear with marked normochromic, normocytic anemia, very rare RBC fragments and spherocytes. Haptoglobin 63, . Concern for possible alveolar hemorrhage as above. Possibly 2/2 chronic Etoh use. Hgb now stable. Cont folic acid.   Coagulopathy. In the  setting of etoh use, poor nutrition. S/p vitamin K. No s/s of acute bleeding. Normalized 12/27.  Hypothyroidism. TSH 5.06, T4 normal in setting of critical illness thus no dose adjustments made. Continue PTA synthroid. Needs repeat TFTS in 6 weeks from 12/13.   Incidental MRCP findings. Abnormal soft tissue posterior to the fundus of the stome, similar to 1/2017 CT. Needs follow up as outpatient for biopsy on discharge.     Diet: regular diet  Fluids: none  DVT Prophylaxis: lovenox   Code Status: FULL    Disposition Plan   Expected discharge: 4 - 7 days; recommended to pending (TCU vs ARU vs inpatient psych) once medically stable.   Information in the above section will display in the discharge planner report.      The patient's care was discussed with the Attending Physician, Dr. Goldstein, Bedside Nurse and Patient.    Negin Mathias  Internal Medicine Staff Hospitalist Service  [facility]  Pager: [phone]  Please see sticky note for cross cover information    Interval History   Ana Laura is doing ok today. She reports feeling well and requesting to discharge home. We discuss that we are happy that she is feeling better, but that we still have some goals to accomplish before she is safe to discharge. She is reluctantly OK with this. She denies active suicidal thoughts. Is trying to eat better. Reports diarrhea overnight has resolved, no loose stools so far today. No chest pain, sob, or dyspnea. Reports dysuria, increased urinary urgency and frequency.     Data reviewed today: I reviewed all medications, new labs and imaging results over the last 24 hours. I personally reviewed labs, imaging, and vitals.     Physical Exam   /80 (BP Location: Right arm)  Temp 98.4  F (36.9  C) (Oral)  Resp 18  Wt 51.5 kg (113 lb 8.6 oz)  SpO2 98%  BMI 20.11 kg/m2     GENERAL: A&O x 3. Resting in bed. Flat affect.   HEENT: Anicteric sclera. MMM.   CV: RRR.   RESPIRATORY: Effort normal on RA.  Lungs CTAB.  GI: Abdomen soft, +BS, no ttp/rebound/guarding.  NEUROLOGICAL: No focal deficits.   EXTREMITIES: trace BLE edema. Intact bilateral pedal pulses.   SKIN: No jaundice. Ecchymosis on L cheek.

## 2017-12-30 NOTE — PROGRESS NOTES
Calorie Count  Intake recorded for: 12/29/2017   Kcals: 1011  Protein: 32g  # Meals Recorded: 2 meals (First - 50% hamburger on bun with cheddar cheese, tomato, lettuce, mustard, ketchup, baked chips, chicken noodle soup, crackers, Powerade)                                                (Second - 75% fresh fruit cup, garden vegetable soup, 2 crackers, peanut butter sandwich on white bread, Powerade)  # Supplements Recorded: 0

## 2017-12-30 NOTE — PLAN OF CARE
/80 (BP Location: Right arm)  Temp 98.4  F (36.9  C) (Oral)  Resp 18  Wt 51.5 kg (113 lb 8.6 oz)  SpO2 98%  BMI 20.11 kg/m2    1117-9885: Pt transferred to unit from .  A&O.  Up w/ Ax2 and walker.  VSS on RA.  L TL PICC SL, L PIV SL.  R and L cheeks w/ pressure injuries d/t ET tube, Vaseline applied per order.  BL arms w/ skin tears and scabbing, mepilex CDI.  PO vanco for C. Diff.  2mg PO Ativan given x 1 this shift.  5mg scheduled oxy given per order this shift for pain.  PO Zofran given x 1 for c/o nausea, no emesis.  Incontinent of B & B.  Small BM this shift.  Pt c/o UTI and requested to have urine sent down for testing, results pending.  BG=160 and 156, SSI given per order.  Mg replaced this shift for 1.6, recheck this am.  Continue to monitor and follow POC.

## 2017-12-30 NOTE — PLAN OF CARE
Problem: Patient Care Overview  Goal: Plan of Care/Patient Progress Review  Discharge Planner OT   Patient plan for discharge: none stated  Current status: Pt progressing in tolerance of ADLS and mobility. Sit>stand CGA and 5 steps bed<>wc with min A. Pt tolerated 30 minutes sitting up in w/c for g/h tasks with min/mod A for ADLS.   Barriers to return to prior living situation: fatigue, weakness, ADL deficits, limited mobiltiy  Recommendations for discharge: TCU  Rationale for recommendations: pt not safe to return to home at current level without continued OT to progress ADLs and mobiltiy       Entered by: Priscila Anne 12/30/2017 2:44 PM

## 2017-12-30 NOTE — PROGRESS NOTES
GASTROENTEROLOGY PROGRESS NOTE      Date of Admission:  12/13/2017  Date of Service:   12/29/2017          ASSESSMENT AND RECOMMENDATIONS:     Assessment:  Ana Laura Wharton is a 47 year old female with a history of hypertension, bipolar disorder, anxiety, refractory bipolar disorder with multiple suicide attempts, hypothyroidism, and recurrent C diff who was admitted after intentional overdose with amlodipine, NyQuil, Tylenol (treated with NAC), and vodka.  Her hospital course has been complicated by prolonged intubation with ARDS, treatment with NAC for acetaminophen, elevated liver tests, and recurrent c-diff following antibiotic administration.  Please see prior GI notes for more in depth discussion of liver injury and concern for pancreatitis.     C diff colitis:  Ms. Wharton has a history of recurrent C diff infection that has responded to therapy and often recurred following administration of antibiotics for other indications.  Expect there is also a component of IBS that contributes to her diarrhea and abdominal pain.  History of C diff detailed below, based on chart review:  - 8/2015: FMT  - 4/2016: Had UTI, given abx, developed C diff and treated with oral vancomycin for 4 months  - 5/2016: C diff negative  - 6/2016: C diff positive  - 8/8/2016: FMT.  Developed UTI and treated with antibiotics within a week of receiving FMT  - 8/13/2016: C diff positive  - 8/21/2016: C diff positive  - 9/2016: Given 10 day course of fidoxamicin with improvement in symptoms  - 10/11/2016: C diff negative  - 1/2017: Had cystits, given abx with vancomycin ppx  - 3/9/2017: C diff positive (was admitted for suicide attempt and had been off medications for weeks to months).  Treatment started with vancomycin taper  - 3/23/2017: With persistent diarrhea, switched vanc taper to fidoxamicin  - 12/13/2017: Current admission, per report had formed stool on admission  - 12/14/2017: Started cefepime and vancomycin IV  - 12/17/2017:  "Switched to ceftriaxone and flagyl for aspiration PNA  - 12/22/2017: C diff positive, started on oral vancomycin, in addition to flagyl, cefepime, and vanc IV  - 12/24/2017: Cefepime and vanc IV stopped, vanc PO and flagyl continued  - 12/26/2017: Flagyl stopped    Currently, Ms. Wharton continues on PO vancomycin 125mg QID for treatment of C diff, and she is off other antibiotics.  Antibiotics stopped 5 days ago, and it is unclear if stool output has improved since.  Leukocytosis is improving.  Imaging does not show significant colonic dilation.  No renal failure.  Not on medications that would be expected to cause diarrhea.  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  Plan to monitor stool output closely.  If there is no improvement, will discuss flex sig/colonoscopy to evaluate for pseudomembranes vs switching to fidoxamicin, which has been effective in the past.    Recommendations    - Continue vancomycin PO 125mg QID  - Monitor and document stool output closely, including number, amount, consistency and incontinence  - Diet as tolerated, encourage PO intake, including nutritional supplements    Gastroenterology follow up recommendations: Will continue to follow    Thank you for involving us in this patient's care. Please do not hesitate to contact the GI service with any questions or concerns.     Pt care plan discussed with Dr. Carlin, GI staff physician.    Rosa Moreno MD  GI Fellow  [phone]    -------------------------------------------------------------------------------------------------------------------         Interval Events:     Reports she continues to have the same number of stools as when she was first diagnosed with C diff, and they are more liquid now.  Says she continues to have incontinence of small amounts, but then will also have \"blow outs.\"  Reports 8 BM in the last day.  Nursing notes suggest that she had about 3 BM in the last day with more frequent need to be cleaned up for small " amounts of stool that seep out vs urine/stool mix.  Ms. Whraton says she is avoiding PO intake because she is afraid it will exacerbate diarrhea.  Continues to have diarrhea despite not eating.  Also notes some nausea without emesis.  Reports ongoing abdominal pain.         Physical Exam:   /79 (BP Location: Right arm)  Temp 98.5  F (36.9  C) (Oral)  Resp 18  Wt 51.5 kg (113 lb 8.6 oz)  SpO2 94%  BMI 20.11 kg/m2  Wt:   Wt Readings from Last 2 Encounters:   12/29/17 51.5 kg (113 lb 8.6 oz)   12/13/17 52.2 kg (115 lb)      Constitutional: Cooperative, not dyspneic/diaphoretic, no acute distress  Eyes: Sclera icteric  Ears/nose/mouth/throat: Mucus membranes moist, hearing intact  Neck: Supple  CV: RRR, no edema  Respiratory: Unlabored breathing  Abd: Soft, diffusely tender to palpation with positive Carnett's sign, nondistended, +bs, no peritoneal signs  Skin: Warm, perfused  Neuro: AAO  Psych: Normal affect  MSK: No gross deformities           Data:   All available labs, imaging, and procedure notes were independently reviewed and interpreted, pertinent values are as follow:    BMP  Recent Labs  Lab 12/29/17 0317 12/28/17  0306 12/27/17  1004 12/26/17  0520    141 138 142   POTASSIUM 4.1 4.2 4.6 3.4   CHLORIDE 104 107 105 104   ISAURO 8.4* 8.2* 8.5 7.8*   CO2 27 26 24 28   BUN 16 19 26 42*   CR 0.63 0.59 0.60 0.73   GLC 90 73 219* 127*     CBC  Recent Labs  Lab 12/29/17 0317 12/28/17  0306 12/27/17  1004 12/26/17  0520   WBC 17.2* 18.6* 22.8* 20.6*   RBC 2.69* 2.67* 3.09* 2.85*   HGB 8.5* 8.4* 9.6* 8.9*   HCT 27.3* 26.9* 30.9* 27.5*   * 101* 100 97   MCH 31.6 31.5 31.1 31.2   MCHC 31.1* 31.2* 31.1* 32.4   RDW 19.8* 20.8* 21.3* 21.0*    345 363 299     INR  Recent Labs  Lab 12/27/17  2103   INR 1.09     LFTs  Recent Labs  Lab 12/29/17  0317 12/28/17  0306 12/27/17  1004 12/26/17  0520   ALKPHOS 930* 1017* 1143* 966*   * 164* 190* 194*   * 163* 166* 148*   BILITOTAL 2.1* 2.7*  4.1* 4.1*   PROTTOTAL 5.7* 5.6* 6.1* 5.2*   ALBUMIN 2.2* 2.3* 2.5* 2.3*      PANC  Recent Labs  Lab 12/26/17  0520   LIPASE 1775*       Imaging  Exam: Abdominal x-ray, 12/28/2017.     COMPARISON: 12/27/2017.     HISTORY: Diarrhea, positive for C. difficile. Concern for toxic  megacolon.     FINDINGS: Supine view of the abdomen was obtained. Nonobstructive  bowel gas pattern with scattered gas-filled loops of predominantly  large bowel, not dilated. The inferior portions of the abdomen were  collimated out of the image. No pneumatosis. No portal venous gas.  Evaluation of free air is limited in the supine position. Prominent  right lobe of the liver correlating with MRI 12/27/2017.         IMPRESSION: Nonobstructive bowel gas pattern with scattered gas-filled  loops of predominantly large bowel, not dilated.

## 2017-12-30 NOTE — PLAN OF CARE
Problem: Patient Care Overview  Goal: Plan of Care/Patient Progress Review  Outcome: Improving  Patient remains in ICU as Gold Service overflow for recovery s/p OD. VS stable throughout shift RA - 2L NC. Patient intermittently c/o pain, which improved with scheduled Oxycodone and PRN Ibuprofen. Patient also requested Ativan x1. Patient ambulated in hallway and got up to chair x2 with PT/OT. Calorie counts continued with improved appetite this afternoon. C. Diff treatment continues with Vancomycin with possible transition to Dificid later in the week. Patient can transfer out of ICU pending bed, MD updated throughout shift. Nursing will monitor and continue with POC.

## 2017-12-30 NOTE — DISCHARGE INSTRUCTIONS
Disability Linkage Line  [phone]  *They can answer questions about Medicare and how to apply for Medicare part B.

## 2017-12-31 ENCOUNTER — APPOINTMENT (OUTPATIENT)
Dept: PHYSICAL THERAPY | Facility: CLINIC | Age: 47
DRG: 917 | End: 2017-12-31
Attending: SURGERY
Payer: COMMERCIAL

## 2017-12-31 LAB
ALBUMIN SERPL-MCNC: 2.5 G/DL (ref 3.4–5)
ALP SERPL-CCNC: 771 U/L (ref 40–150)
ALT SERPL W P-5'-P-CCNC: 82 U/L (ref 0–50)
ANION GAP SERPL CALCULATED.3IONS-SCNC: 6 MMOL/L (ref 3–14)
AST SERPL W P-5'-P-CCNC: 75 U/L (ref 0–45)
BILIRUB SERPL-MCNC: 1.5 MG/DL (ref 0.2–1.3)
BUN SERPL-MCNC: 18 MG/DL (ref 7–30)
CALCIUM SERPL-MCNC: 8.8 MG/DL (ref 8.5–10.1)
CHLORIDE SERPL-SCNC: 105 MMOL/L (ref 94–109)
CO2 SERPL-SCNC: 28 MMOL/L (ref 20–32)
CREAT SERPL-MCNC: 0.55 MG/DL (ref 0.52–1.04)
ERYTHROCYTE [DISTWIDTH] IN BLOOD BY AUTOMATED COUNT: 18.8 % (ref 10–15)
GFR SERPL CREATININE-BSD FRML MDRD: >90 ML/MIN/1.7M2
GLUCOSE BLDC GLUCOMTR-MCNC: 122 MG/DL (ref 70–99)
GLUCOSE BLDC GLUCOMTR-MCNC: 125 MG/DL (ref 70–99)
GLUCOSE BLDC GLUCOMTR-MCNC: 90 MG/DL (ref 70–99)
GLUCOSE BLDC GLUCOMTR-MCNC: 92 MG/DL (ref 70–99)
GLUCOSE BLDC GLUCOMTR-MCNC: 96 MG/DL (ref 70–99)
GLUCOSE BLDC GLUCOMTR-MCNC: 99 MG/DL (ref 70–99)
GLUCOSE SERPL-MCNC: 109 MG/DL (ref 70–99)
HCT VFR BLD AUTO: 29.7 % (ref 35–47)
HGB BLD-MCNC: 9.2 G/DL (ref 11.7–15.7)
MAGNESIUM SERPL-MCNC: 1.8 MG/DL (ref 1.6–2.3)
MCH RBC QN AUTO: 32.3 PG (ref 26.5–33)
MCHC RBC AUTO-ENTMCNC: 31 G/DL (ref 31.5–36.5)
MCV RBC AUTO: 104 FL (ref 78–100)
PHOSPHATE SERPL-MCNC: 3.2 MG/DL (ref 2.5–4.5)
PLATELET # BLD AUTO: 412 10E9/L (ref 150–450)
POTASSIUM SERPL-SCNC: 4.1 MMOL/L (ref 3.4–5.3)
PROT SERPL-MCNC: 6.4 G/DL (ref 6.8–8.8)
RBC # BLD AUTO: 2.85 10E12/L (ref 3.8–5.2)
SODIUM SERPL-SCNC: 138 MMOL/L (ref 133–144)
WBC # BLD AUTO: 14.6 10E9/L (ref 4–11)

## 2017-12-31 PROCEDURE — 83735 ASSAY OF MAGNESIUM: CPT | Performed by: PHYSICIAN ASSISTANT

## 2017-12-31 PROCEDURE — 40000193 ZZH STATISTIC PT WARD VISIT

## 2017-12-31 PROCEDURE — 25000132 ZZH RX MED GY IP 250 OP 250 PS 637: Performed by: STUDENT IN AN ORGANIZED HEALTH CARE EDUCATION/TRAINING PROGRAM

## 2017-12-31 PROCEDURE — 99207 ZZC APP CREDIT; MD BILLING SHARED VISIT: CPT | Performed by: PHYSICIAN ASSISTANT

## 2017-12-31 PROCEDURE — 25000132 ZZH RX MED GY IP 250 OP 250 PS 637: Performed by: INTERNAL MEDICINE

## 2017-12-31 PROCEDURE — 25000132 ZZH RX MED GY IP 250 OP 250 PS 637: Performed by: NURSE PRACTITIONER

## 2017-12-31 PROCEDURE — 25000132 ZZH RX MED GY IP 250 OP 250 PS 637

## 2017-12-31 PROCEDURE — 25000125 ZZHC RX 250: Performed by: STUDENT IN AN ORGANIZED HEALTH CARE EDUCATION/TRAINING PROGRAM

## 2017-12-31 PROCEDURE — 40000275 ZZH STATISTIC RCP TIME EA 10 MIN

## 2017-12-31 PROCEDURE — 00000146 ZZHCL STATISTIC GLUCOSE BY METER IP

## 2017-12-31 PROCEDURE — 84100 ASSAY OF PHOSPHORUS: CPT | Performed by: PHYSICIAN ASSISTANT

## 2017-12-31 PROCEDURE — 25000132 ZZH RX MED GY IP 250 OP 250 PS 637: Performed by: PHYSICIAN ASSISTANT

## 2017-12-31 PROCEDURE — 97116 GAIT TRAINING THERAPY: CPT | Mod: GP

## 2017-12-31 PROCEDURE — 36592 COLLECT BLOOD FROM PICC: CPT | Performed by: PHYSICIAN ASSISTANT

## 2017-12-31 PROCEDURE — 85027 COMPLETE CBC AUTOMATED: CPT | Performed by: PHYSICIAN ASSISTANT

## 2017-12-31 PROCEDURE — 99233 SBSQ HOSP IP/OBS HIGH 50: CPT | Performed by: INTERNAL MEDICINE

## 2017-12-31 PROCEDURE — 97530 THERAPEUTIC ACTIVITIES: CPT | Mod: GP

## 2017-12-31 PROCEDURE — 25000128 H RX IP 250 OP 636: Performed by: INTERNAL MEDICINE

## 2017-12-31 PROCEDURE — 80053 COMPREHEN METABOLIC PANEL: CPT | Performed by: PHYSICIAN ASSISTANT

## 2017-12-31 PROCEDURE — 12000001 ZZH R&B MED SURG/OB UMMC

## 2017-12-31 PROCEDURE — 40000558 ZZH STATISTIC CVC DRESSING CHANGE

## 2017-12-31 RX ORDER — BENZONATATE 100 MG/1
100 CAPSULE ORAL 3 TIMES DAILY PRN
Status: DISCONTINUED | OUTPATIENT
Start: 2017-12-31 | End: 2018-01-05 | Stop reason: HOSPADM

## 2017-12-31 RX ORDER — PHENAZOPYRIDINE HYDROCHLORIDE 100 MG/1
100 TABLET, FILM COATED ORAL
Status: DISPENSED | OUTPATIENT
Start: 2017-12-31 | End: 2018-01-03

## 2017-12-31 RX ADMIN — Medication 125 MG: at 12:32

## 2017-12-31 RX ADMIN — Medication 125 MG: at 15:21

## 2017-12-31 RX ADMIN — OLANZAPINE 5 MG: 5 TABLET, ORALLY DISINTEGRATING ORAL at 08:08

## 2017-12-31 RX ADMIN — LORAZEPAM 1 MG: 1 TABLET ORAL at 08:08

## 2017-12-31 RX ADMIN — Medication 2 G: at 12:41

## 2017-12-31 RX ADMIN — Medication 125 MG: at 08:08

## 2017-12-31 RX ADMIN — CARVEDILOL 6.25 MG: 6.25 TABLET, FILM COATED ORAL at 08:08

## 2017-12-31 RX ADMIN — LORAZEPAM 1 MG: 1 TABLET ORAL at 04:10

## 2017-12-31 RX ADMIN — LORAZEPAM 1 MG: 1 TABLET ORAL at 16:24

## 2017-12-31 RX ADMIN — BENZONATATE 100 MG: 100 CAPSULE, LIQUID FILLED ORAL at 18:30

## 2017-12-31 RX ADMIN — ENOXAPARIN SODIUM 40 MG: 40 INJECTION SUBCUTANEOUS at 12:32

## 2017-12-31 RX ADMIN — LORAZEPAM 1 MG: 1 TABLET ORAL at 00:57

## 2017-12-31 RX ADMIN — Medication 100 MG: at 08:09

## 2017-12-31 RX ADMIN — CARVEDILOL 6.25 MG: 6.25 TABLET, FILM COATED ORAL at 20:36

## 2017-12-31 RX ADMIN — CLONIDINE HYDROCHLORIDE 0.1 MG: 0.1 TABLET ORAL at 08:08

## 2017-12-31 RX ADMIN — LEVOTHYROXINE SODIUM 50 MCG: 25 TABLET ORAL at 08:08

## 2017-12-31 RX ADMIN — PHENAZOPYRIDINE HYDROCHLORIDE 100 MG: 100 TABLET, COATED ORAL at 12:32

## 2017-12-31 RX ADMIN — PHENAZOPYRIDINE HYDROCHLORIDE 100 MG: 100 TABLET, COATED ORAL at 16:24

## 2017-12-31 RX ADMIN — OXYCODONE HYDROCHLORIDE 5 MG: 5 TABLET ORAL at 04:11

## 2017-12-31 RX ADMIN — NITROFURANTOIN MONOHYDRATE/MACROCRYSTALLINE 100 MG: 25; 75 CAPSULE ORAL at 20:36

## 2017-12-31 RX ADMIN — CLONIDINE HYDROCHLORIDE 0.1 MG: 0.1 TABLET ORAL at 14:53

## 2017-12-31 RX ADMIN — CLONIDINE HYDROCHLORIDE 0.1 MG: 0.1 TABLET ORAL at 20:36

## 2017-12-31 RX ADMIN — Medication 125 MG: at 20:35

## 2017-12-31 RX ADMIN — FOLIC ACID 1 MG: 1 TABLET ORAL at 08:08

## 2017-12-31 RX ADMIN — LORAZEPAM 2 MG: 1 TABLET ORAL at 20:36

## 2017-12-31 RX ADMIN — PANTOPRAZOLE SODIUM 40 MG: 40 TABLET, DELAYED RELEASE ORAL at 08:08

## 2017-12-31 RX ADMIN — NITROFURANTOIN MONOHYDRATE/MACROCRYSTALLINE 100 MG: 25; 75 CAPSULE ORAL at 08:08

## 2017-12-31 RX ADMIN — IBUPROFEN 600 MG: 200 TABLET, FILM COATED ORAL at 08:20

## 2017-12-31 RX ADMIN — LORAZEPAM 1 MG: 1 TABLET ORAL at 12:32

## 2017-12-31 RX ADMIN — IBUPROFEN 600 MG: 200 TABLET, FILM COATED ORAL at 20:36

## 2017-12-31 RX ADMIN — POTASSIUM CHLORIDE 20 MEQ: 750 TABLET, EXTENDED RELEASE ORAL at 00:57

## 2017-12-31 RX ADMIN — OLANZAPINE 5 MG: 5 TABLET, ORALLY DISINTEGRATING ORAL at 20:37

## 2017-12-31 RX ADMIN — MIRTAZAPINE 15 MG: 15 TABLET, FILM COATED ORAL at 20:37

## 2017-12-31 ASSESSMENT — PAIN DESCRIPTION - DESCRIPTORS: DESCRIPTORS: DISCOMFORT;HEADACHE

## 2017-12-31 NOTE — PLAN OF CARE
/75 (BP Location: Right arm)  Pulse 75  Temp 98.6  F (37  C) (Oral)  Resp 16  Wt 51.5 kg (113 lb 8.6 oz)  SpO2 97%  BMI 20.11 kg/m2    2999-6848:  Pt more alert this shift.  VSS on RA.  L TL PICC and L PIV SL.  R and L cheek pressure injuries, Vaseline applied per order.  BL arms w/ skin tears and scabbing, mepilex CDI.  PRN Ativan given Q4H x 2 for anxiety.  PO vanco for C. Diff and PO macrobid for UTI.  Voiding WNL in bedpan.  No BM this shift.  5mg PO oxy given x 1 for c/o pain.  No appetite.  BG=99 and 96, SSI held per order.  K+ replaced this shift for 3.8, recheck this am.  Continue to monitor and follow POC.

## 2017-12-31 NOTE — PROGRESS NOTES
Social Work Services Progress Note  LATE ENTRY:  12/30/17  Hospital Day: 19  Date of Initial Social Work Evaluation:  12/14/17  Collaborated with:  HAMIDA Kam; patient    Data:  HAMIDA Kam, requests that SW see pt on 12/30 as pt has several financial worries and does not seem to remember SW's previous visit with her.    Intervention:  Chart reviewed.  SW met with pt on 12/30 to discuss her worries.  Pt again reiterates that she is anticipating her [employer] policy to term on 12/31, though anticipates being in the hospital after this date.  SW informed pt that private policies tend to provide coverage for the length of the hospitalization even after it has termed if it is connected to an encounter that occurred before the term date.  (SW unable to guarantee this as writer unable to call insurance due to it being the weekend and insurance offices are closed).  SW reminded pt that she has Medicare part A as well as that also can provide coverage for hospitalization.  Sw informed pt that Medicare part A could also cover ARU.  Pt informs SW that she intends to d/c home.  SW informed pt that writer would keep the referral to ARU active as long as it is recommended as pt may change her mind.      We again discussed applying for MA as MA would provide retro-active coverage if she gets approved.  Pt states that she tried to apply for MA in the past but was over assets.  She does not anticipate her assets changing despite going through a divorce with her .  She asks that the Financial Counselor visit be cancelled as she feels certain she will still not qualify for MA.  She does ask for help in applying for Medicare part B.  Pt does not know where to start with this application.  SW suggested asking Disability Linkage Line and gave pt the number as well as put the phone number in her discharge instructions.  (Pt states she has no family or friends that she can ask for help with this and appeared defensive,  telling SW that she was intelligent enough to handle applying for this on her own). Sw also gave pt the website address to Medicare (www.medicare.gov) as it may tell her on-line how to apply.    Assessment:  Pt's worries addressed though pt appears intent on discharging home.  Pt may appear to lack insight into her deficits as she again tells writer she has no one that can help her at home.  Pt pleasant though has a flat affect.    Plan:    Anticipated Disposition:  to be determined.    Barriers to d/c plan:  Medical stability.    Follow Up:  FELICIA will continue to follow.    SUYAPA Pritchard  Weekend   [phone] pager 8:00am-4:30pm  [phone] After-hours pager 4:00pm until midnight

## 2017-12-31 NOTE — PLAN OF CARE
Problem: Patient Care Overview  Goal: Plan of Care/Patient Progress Review  Discharge Planner PT   Patient plan for discharge: TCU  Current status: Patient demonstrated limited activity tolerance but improved independence with bed mobility and transfers compared with previous session. Patient independent bed mobility, min-mod assist transfers and min-mod assist for very short gait trials with w/c follow. Dyspnea on exertion noted during all activities.  Barriers to return to prior living situation: poor activity tolerance, SOB, functional weakness, increased dependence for transfers and gait  Recommendations for discharge: TCU  Rationale for recommendations: Patient significantly below baseline functional status and demonstrates poor tolerance to upright activities       Entered by: Beronica Lofton 12/31/2017 5:05 PM

## 2017-12-31 NOTE — PLAN OF CARE
Problem: Patient Care Overview  Goal: Plan of Care/Patient Progress Review  Outcome: No Change  Pt admitted for an intentional overdose with amlodipine, Nyquil, Tylenol, and Vodka. A&Ox4. Pt was pleasant and calm. Pt felt anxious and Ativan was given q4h. Pt complained of back pain and pain with urination, ibuprofen and Oxycodone was given. Pt had urinary frequency, PO antibiotics given for an UTI. No BMs this shift. Pt received PO Vanco for c-diff. PICC in the left arm. Pt had pressure ulcers on her bilat cheeks, and they were washed with NS, and gently rub with vaseline. Pt able to make her needs known. Continue with plan of care.

## 2017-12-31 NOTE — PROGRESS NOTES
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 ICU course c/b lactic acidosis, acute hypoxic/hypercarbic respiratory failure 2/2 aspiration requiring intubation c/b ARDS, ARF, severe hypotension/vasogenic shock requiring pressors, paroxysmal A.fib, intermittent SVT, and sepsis 2/2 sinusitis. Patient extubated 12/24/17, transferred to medicine 12/25 for ongoing care.       Intentional overdose 12/13 with bipolar disorder, depression, anxiety, etoh abuse, possible benzodiazapine w/d: Overdose with amlodipine, Tylenol, Nyquil and Etoh. PTA drinking 16 oz vodka daily for 1.5 days. Treated with Ca gtt, high dose insulin, and NAC per poison control. Extubated 12/24. Sitter discontinued 12/28 at psych recs. Will likely d/c to inpatient psychiatry vs TCU pending progress and what facilities will accept patient. Denies active SI at this time.   - Suicide precautions   - Continue scheduled ativan, Remeron, olanzapine    - Daily thiamine, folic acid, MVI  - If patient attempts to leave, will need to place 72 hr hold      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 C.diff +12/22. AXR 12/28 no acute findings. Currently on po vancomycin with slow improvement in stooling, only 1 stool in past 48 hours.   - GI following, plan to cont vanco po 125mg QID  - Consider colonoscopy with bx if not getting better  - Obtain KUB XR if acute worsening to eval for toxic megacolon      UTI. Patient reports dysuria,  increased urinary frequency overnight 12/30. UA with WBC, +LE, neg nitrate. UCx pending. Most recent UCx with Enterococcus faecalis pansensitive. Hesitant to start broad spec abx given c.diff as above. Will continue macrobid BID for now. Cont to follow urine culture. Cont pyridium for dysuria.       Acute pancreatitis, transaminitis. Lipase 1775 12/26. Previously normal. US no stones. MRCP normal 12/27. ALP improving 771, Tbili improving 1.5. ALT, AST stable. Diffuse abdominal tenderness. GI following, transaminitis thought to be 2/2 cholestasis of sepsis and critical illness. Tolerating diet as of 12/29.  - GI following  - Regular diet as tolerated, give IVF if acutely decompensates  - CMP tomorrow      ARDS 2/2 aspiration, acute hypoxic/hypercarbic respiratory failure. Initially d/t overdose, V/Q mismatch in setting of ARDS. S/p intubation (initially prone) flolan, solumedrol, diuretics, and Duobneb. Extubated 12/24. Completed steroids 12/28. VSS, intermittent supplemental O2 up to 2L, but no increased effort on RA. Ongoing dry cough, which will likely persist for weeks. No new fevers or hypoxia.   - Acapella, IS, supplemental O2, prn Duonebs       Likely acute opiate withdrawal. Signs and symptoms of opiate withdrawal noted 12/27, likely 2/2 high fentanyl dosing during intubation (chills, myalgia, diarrhea, diaphoresis, abdominal pain). Although several symptoms can be connected to other etiology (ex. Diarrhea and c diff, abdominal pain and pancreatitis). Started on scheduled opiates 12/27 with improvement, tapered off completely today. Cont to monitor for s/sx of withdrawal.       Altered mental status. Near resolved. Waxing and waning mental clarity. A&O x3, however, reports of hallucinations and inappropriate comments (none today). Lactic acid normal. Likely multifactorial including rapid cycling bipolar with psychotic features, possible drug-induced psychosis, ongoing c diff, and possible hospital acquired  delirium.      Severe malnutrition. Cleared for regular diet, although oral intake has been poor. Prealbumin 25 12/28. Very limited PO intake, patient reports due to high volume BM. Would like to avoid NJ at all lengths. PO intake starting to improve after long discussion about TFs. Nutrition following, cont calorie counts. Consider TF if po intake less than 50% of caloric needs.       HTN. PTA on Norvasc. Currently on Hydralazine, clonidine and Coreg. BP normotensive. Continue Hydralazine, clonidine and Coreg with hold parameters       Pressure injury. Not present on admission. Bilateral cheek and left forearm. WOCN following, last seen 12/29 with recs to cleanse with saline solution and continue off loading measures.         Resolved Hospital Problems and Stable, Chronic Medical Conditions:  Hypernatremia. Elevated from admission to 12/25, normalized 12/26. Thought 2/2 hypovolemia.  ARF. 2/2 CCB overdose. Cr peak 1.62. C/b volume overload and pulmonary edema, treated with IV lasix. Cr since normalized.   SVT. Presented with HR 190s in setting of critical illness following OD, resolved with IVF.   Paroxysmal a fib. Brief episode of a fib 12/20 with conversion to NSR with metoprolol x2. No further episodes. On Coreg as above   Normocytic anemia. Hgb 10.4 on admit. Drop to 5.8 12/19, s/p 1U PRBC. Smear with marked normochromic, normocytic anemia, very rare RBC fragments and spherocytes. Haptoglobin 63, . Concern for possible alveolar hemorrhage as above. Possibly 2/2 chronic Etoh use. Hgb now stable. Cont folic acid.   Coagulopathy. In the setting of etoh use, poor nutrition. S/p vitamin K. No s/s of acute bleeding. Normalized 12/27.  Hypothyroidism. TSH 5.06, T4 normal in setting of critical illness thus no dose adjustments made. Continue PTA synthroid. Needs repeat TFTS in 6 weeks from 12/13.   Incidental MRCP findings. Abnormal soft tissue posterior to the fundus of the stome, similar to 1/2017 CT. Needs  follow up as outpatient for biopsy on discharge.      Diet: regular diet  Fluids: none  DVT Prophylaxis: lovenox   Code Status: FULL    Disposition Plan   Expected discharge: 4 - 7 days; recommended to TCU vs ARU vs inpatient psyc once medically stable, dispo plan established.   Information in the above section will display in the discharge planner report.      The patient's care was discussed with the Attending Physician, Dr. Goldstein, Bedside Nurse and Patient.    Negin Hyde [facility] [place]  Internal Medicine Staff Hospitalist Service  [facility]  Pager: [phone]  Please see sticky note for cross cover information    Interval History   Ana Laura is doing okay today. Reports ongoing dysuria and urinary urgency. No fevers or chills. Ongoing dry cough.o chest pain, sob, or dyspnea. C.diff improving, only 1 loose stool over past 48h.     Data reviewed today: I reviewed all medications, new labs and imaging results over the last 24 hours. I personally reviewed labs, imaging, and vitals.     Physical Exam   /75 (BP Location: Right arm)  Pulse 75  Temp 98.6  F (37  C) (Oral)  Resp 16  Wt 51.5 kg (113 lb 8.6 oz)  SpO2 97%  BMI 20.11 kg/m2     GENERAL: Alert and oriented x 3. NAD. WDWN.  at bedside.  HEENT: Anicteric sclera. MMM.   CV: RRR. S1, S2. No murmurs appreciated.   RESPIRATORY: Effort normal on RA. Lungs CTAB, no wheezing.   GI: Abdomen soft and non distended with normoactive bowel sounds present in all quadrants. No tenderness, rebound, guarding.   NEUROLOGICAL: No focal deficits. Moves all extremities.    EXTREMITIES: No peripheral edema. Intact bilateral pedal pulses.   SKIN: No jaundice. No rashes. Ecchymosis over L cheek.

## 2017-12-31 NOTE — PLAN OF CARE
Problem: Patient Care Overview  Goal: Plan of Care/Patient Progress Review  Outcome: Improving    Shift [id number]. Patient remains A&O, pleasant and up X2- independently positioning. Patient able to call for bedpan with occasional urge incontinence. Patient report pain with urination (treating UTI) MD's notified and pyridium scheduled, prn Ibuprofen given with relief. Weak dry cough- incentive spirometer provided. Patient's appetite improved today and ambuled halls with PT. Friend coming to visit, pt in good spirits.

## 2018-01-01 ENCOUNTER — APPOINTMENT (OUTPATIENT)
Dept: GENERAL RADIOLOGY | Facility: CLINIC | Age: 48
DRG: 917 | End: 2018-01-01
Attending: INTERNAL MEDICINE
Payer: COMMERCIAL

## 2018-01-01 LAB
ALBUMIN SERPL-MCNC: 2.6 G/DL (ref 3.4–5)
ALBUMIN UR-MCNC: NEGATIVE MG/DL
ALP SERPL-CCNC: 648 U/L (ref 40–150)
ALT SERPL W P-5'-P-CCNC: 65 U/L (ref 0–50)
ANION GAP SERPL CALCULATED.3IONS-SCNC: 11 MMOL/L (ref 3–14)
APPEARANCE UR: CLEAR
AST SERPL W P-5'-P-CCNC: 61 U/L (ref 0–45)
BILIRUB SERPL-MCNC: 1.5 MG/DL (ref 0.2–1.3)
BILIRUB UR QL STRIP: NEGATIVE
BUN SERPL-MCNC: 16 MG/DL (ref 7–30)
CALCIUM SERPL-MCNC: 9.2 MG/DL (ref 8.5–10.1)
CHLORIDE SERPL-SCNC: 102 MMOL/L (ref 94–109)
CO2 SERPL-SCNC: 24 MMOL/L (ref 20–32)
COLOR UR AUTO: ABNORMAL
CREAT SERPL-MCNC: 0.66 MG/DL (ref 0.52–1.04)
ERYTHROCYTE [DISTWIDTH] IN BLOOD BY AUTOMATED COUNT: 18.7 % (ref 10–15)
GFR SERPL CREATININE-BSD FRML MDRD: >90 ML/MIN/1.7M2
GLUCOSE BLDC GLUCOMTR-MCNC: 106 MG/DL (ref 70–99)
GLUCOSE BLDC GLUCOMTR-MCNC: 85 MG/DL (ref 70–99)
GLUCOSE BLDC GLUCOMTR-MCNC: 92 MG/DL (ref 70–99)
GLUCOSE SERPL-MCNC: 149 MG/DL (ref 70–99)
GLUCOSE UR STRIP-MCNC: NEGATIVE MG/DL
HCT VFR BLD AUTO: 30 % (ref 35–47)
HGB BLD-MCNC: 9.2 G/DL (ref 11.7–15.7)
HGB UR QL STRIP: NEGATIVE
INR PPP: 1.07 (ref 0.86–1.14)
KETONES UR STRIP-MCNC: NEGATIVE MG/DL
LEUKOCYTE ESTERASE UR QL STRIP: NEGATIVE
MAGNESIUM SERPL-MCNC: 1.9 MG/DL (ref 1.6–2.3)
MCH RBC QN AUTO: 32.2 PG (ref 26.5–33)
MCHC RBC AUTO-ENTMCNC: 30.7 G/DL (ref 31.5–36.5)
MCV RBC AUTO: 105 FL (ref 78–100)
NITRATE UR QL: NEGATIVE
PH UR STRIP: 5 PH (ref 5–7)
PHOSPHATE SERPL-MCNC: 4 MG/DL (ref 2.5–4.5)
PLATELET # BLD AUTO: 373 10E9/L (ref 150–450)
POTASSIUM SERPL-SCNC: 3.8 MMOL/L (ref 3.4–5.3)
PROT SERPL-MCNC: 6.5 G/DL (ref 6.8–8.8)
RBC # BLD AUTO: 2.86 10E12/L (ref 3.8–5.2)
RBC #/AREA URNS AUTO: 1 /HPF (ref 0–2)
SODIUM SERPL-SCNC: 137 MMOL/L (ref 133–144)
SOURCE: ABNORMAL
SP GR UR STRIP: 1 (ref 1–1.03)
SPECIMEN SOURCE: ABNORMAL
SPECIMEN SOURCE: NORMAL
SQUAMOUS #/AREA URNS AUTO: 1 /HPF (ref 0–1)
T VAGINALIS DNA SPEC QL NAA+PROBE: NORMAL
TRANS CELLS #/AREA URNS HPF: <1 /HPF (ref 0–1)
UROBILINOGEN UR STRIP-MCNC: NORMAL MG/DL (ref 0–2)
WBC # BLD AUTO: 13.6 10E9/L (ref 4–11)
WBC #/AREA URNS AUTO: 3 /HPF (ref 0–2)
WET PREP SPEC: ABNORMAL

## 2018-01-01 PROCEDURE — 71046 X-RAY EXAM CHEST 2 VIEWS: CPT

## 2018-01-01 PROCEDURE — 25000132 ZZH RX MED GY IP 250 OP 250 PS 637: Performed by: PHYSICIAN ASSISTANT

## 2018-01-01 PROCEDURE — 25000132 ZZH RX MED GY IP 250 OP 250 PS 637: Performed by: INTERNAL MEDICINE

## 2018-01-01 PROCEDURE — 99233 SBSQ HOSP IP/OBS HIGH 50: CPT | Performed by: INTERNAL MEDICINE

## 2018-01-01 PROCEDURE — 87661 TRICHOMONAS VAGINALIS AMPLIF: CPT | Performed by: INTERNAL MEDICINE

## 2018-01-01 PROCEDURE — 25000128 H RX IP 250 OP 636: Performed by: INTERNAL MEDICINE

## 2018-01-01 PROCEDURE — 84100 ASSAY OF PHOSPHORUS: CPT | Performed by: STUDENT IN AN ORGANIZED HEALTH CARE EDUCATION/TRAINING PROGRAM

## 2018-01-01 PROCEDURE — 25000132 ZZH RX MED GY IP 250 OP 250 PS 637: Performed by: NURSE PRACTITIONER

## 2018-01-01 PROCEDURE — 25000132 ZZH RX MED GY IP 250 OP 250 PS 637: Performed by: STUDENT IN AN ORGANIZED HEALTH CARE EDUCATION/TRAINING PROGRAM

## 2018-01-01 PROCEDURE — 99207 ZZC APP CREDIT; MD BILLING SHARED VISIT: CPT | Performed by: PHYSICIAN ASSISTANT

## 2018-01-01 PROCEDURE — 25000125 ZZHC RX 250: Performed by: STUDENT IN AN ORGANIZED HEALTH CARE EDUCATION/TRAINING PROGRAM

## 2018-01-01 PROCEDURE — 80053 COMPREHEN METABOLIC PANEL: CPT | Performed by: STUDENT IN AN ORGANIZED HEALTH CARE EDUCATION/TRAINING PROGRAM

## 2018-01-01 PROCEDURE — 12000001 ZZH R&B MED SURG/OB UMMC

## 2018-01-01 PROCEDURE — 85610 PROTHROMBIN TIME: CPT | Performed by: STUDENT IN AN ORGANIZED HEALTH CARE EDUCATION/TRAINING PROGRAM

## 2018-01-01 PROCEDURE — 81001 URINALYSIS AUTO W/SCOPE: CPT | Performed by: INTERNAL MEDICINE

## 2018-01-01 PROCEDURE — 87210 SMEAR WET MOUNT SALINE/INK: CPT | Performed by: PHYSICIAN ASSISTANT

## 2018-01-01 PROCEDURE — 85027 COMPLETE CBC AUTOMATED: CPT | Performed by: STUDENT IN AN ORGANIZED HEALTH CARE EDUCATION/TRAINING PROGRAM

## 2018-01-01 PROCEDURE — 00000146 ZZHCL STATISTIC GLUCOSE BY METER IP

## 2018-01-01 PROCEDURE — 36592 COLLECT BLOOD FROM PICC: CPT | Performed by: STUDENT IN AN ORGANIZED HEALTH CARE EDUCATION/TRAINING PROGRAM

## 2018-01-01 PROCEDURE — 25000132 ZZH RX MED GY IP 250 OP 250 PS 637

## 2018-01-01 PROCEDURE — 83735 ASSAY OF MAGNESIUM: CPT | Performed by: STUDENT IN AN ORGANIZED HEALTH CARE EDUCATION/TRAINING PROGRAM

## 2018-01-01 RX ORDER — FLUCONAZOLE 150 MG/1
150 TABLET ORAL
Status: DISCONTINUED | OUTPATIENT
Start: 2018-01-04 | End: 2018-01-04

## 2018-01-01 RX ORDER — FLUCONAZOLE 150 MG/1
150 TABLET ORAL ONCE
Status: COMPLETED | OUTPATIENT
Start: 2018-01-01 | End: 2018-01-01

## 2018-01-01 RX ADMIN — PHENAZOPYRIDINE HYDROCHLORIDE 100 MG: 100 TABLET, COATED ORAL at 08:55

## 2018-01-01 RX ADMIN — OLANZAPINE 5 MG: 5 TABLET, ORALLY DISINTEGRATING ORAL at 08:55

## 2018-01-01 RX ADMIN — OLANZAPINE 5 MG: 5 TABLET, ORALLY DISINTEGRATING ORAL at 20:28

## 2018-01-01 RX ADMIN — LEVOTHYROXINE SODIUM 50 MCG: 25 TABLET ORAL at 08:55

## 2018-01-01 RX ADMIN — LORAZEPAM 1 MG: 1 TABLET ORAL at 08:55

## 2018-01-01 RX ADMIN — LORAZEPAM 1 MG: 1 TABLET ORAL at 05:57

## 2018-01-01 RX ADMIN — CLONIDINE HYDROCHLORIDE 0.1 MG: 0.1 TABLET ORAL at 20:29

## 2018-01-01 RX ADMIN — Medication 125 MG: at 12:06

## 2018-01-01 RX ADMIN — IBUPROFEN 600 MG: 200 TABLET, FILM COATED ORAL at 22:37

## 2018-01-01 RX ADMIN — IBUPROFEN 600 MG: 200 TABLET, FILM COATED ORAL at 16:32

## 2018-01-01 RX ADMIN — CARVEDILOL 6.25 MG: 6.25 TABLET, FILM COATED ORAL at 20:29

## 2018-01-01 RX ADMIN — CLONIDINE HYDROCHLORIDE 0.1 MG: 0.1 TABLET ORAL at 14:01

## 2018-01-01 RX ADMIN — LORAZEPAM 1 MG: 1 TABLET ORAL at 20:44

## 2018-01-01 RX ADMIN — PANTOPRAZOLE SODIUM 40 MG: 40 TABLET, DELAYED RELEASE ORAL at 08:55

## 2018-01-01 RX ADMIN — Medication 125 MG: at 16:11

## 2018-01-01 RX ADMIN — LORAZEPAM 1 MG: 1 TABLET ORAL at 20:55

## 2018-01-01 RX ADMIN — Medication 125 MG: at 08:55

## 2018-01-01 RX ADMIN — CLONIDINE HYDROCHLORIDE 0.1 MG: 0.1 TABLET ORAL at 08:55

## 2018-01-01 RX ADMIN — SODIUM CHLORIDE, PRESERVATIVE FREE 5 ML: 5 INJECTION INTRAVENOUS at 06:04

## 2018-01-01 RX ADMIN — BENZONATATE 100 MG: 100 CAPSULE, LIQUID FILLED ORAL at 09:41

## 2018-01-01 RX ADMIN — NITROFURANTOIN MONOHYDRATE/MACROCRYSTALLINE 100 MG: 25; 75 CAPSULE ORAL at 20:55

## 2018-01-01 RX ADMIN — POTASSIUM CHLORIDE 20 MEQ: 750 TABLET, EXTENDED RELEASE ORAL at 16:11

## 2018-01-01 RX ADMIN — LORAZEPAM 1 MG: 1 TABLET ORAL at 16:11

## 2018-01-01 RX ADMIN — FOLIC ACID 1 MG: 1 TABLET ORAL at 08:55

## 2018-01-01 RX ADMIN — BENZONATATE 100 MG: 100 CAPSULE, LIQUID FILLED ORAL at 16:32

## 2018-01-01 RX ADMIN — CARVEDILOL 6.25 MG: 6.25 TABLET, FILM COATED ORAL at 08:55

## 2018-01-01 RX ADMIN — IBUPROFEN 600 MG: 200 TABLET, FILM COATED ORAL at 09:41

## 2018-01-01 RX ADMIN — BENZONATATE 100 MG: 100 CAPSULE, LIQUID FILLED ORAL at 20:29

## 2018-01-01 RX ADMIN — LORAZEPAM 1 MG: 1 TABLET ORAL at 12:05

## 2018-01-01 RX ADMIN — ENOXAPARIN SODIUM 40 MG: 40 INJECTION SUBCUTANEOUS at 12:06

## 2018-01-01 RX ADMIN — FLUCONAZOLE 150 MG: 150 TABLET ORAL at 12:05

## 2018-01-01 RX ADMIN — Medication 100 MG: at 08:55

## 2018-01-01 RX ADMIN — Medication 2 G: at 08:56

## 2018-01-01 RX ADMIN — BENZONATATE 100 MG: 100 CAPSULE, LIQUID FILLED ORAL at 05:57

## 2018-01-01 RX ADMIN — PHENAZOPYRIDINE HYDROCHLORIDE 100 MG: 100 TABLET, COATED ORAL at 18:34

## 2018-01-01 RX ADMIN — NITROFURANTOIN MONOHYDRATE/MACROCRYSTALLINE 100 MG: 25; 75 CAPSULE ORAL at 08:55

## 2018-01-01 RX ADMIN — MIRTAZAPINE 15 MG: 15 TABLET, FILM COATED ORAL at 20:55

## 2018-01-01 RX ADMIN — PHENAZOPYRIDINE HYDROCHLORIDE 100 MG: 100 TABLET, COATED ORAL at 12:05

## 2018-01-01 RX ADMIN — Medication 125 MG: at 20:44

## 2018-01-01 NOTE — PLAN OF CARE
Problem: Patient Care Overview  Goal: Plan of Care/Patient Progress Review  Outcome: No Change  Blood pressure 127/68, pulse 85, temperature 96.9  F (36.1  C), temperature source Oral, resp. rate 18, weight 51.5 kg (113 lb 8.6 oz), SpO2 93 %.    Pt admitted for an intentional overdose with amlodipine, Nyquil, Tylenol, and Vodka. Pt is currently A&Ox4, calling appropriately for help. Pt is heavy assist of 2 for transfers. Needs encouragement to increase physical activity. Pt is in enteric isolation and receiving PO Vanco for c-diff. Back pain managed with PRN Ibuprofen. PICC in the left arm is saline locked. Pt has pressure ulcers on her bilateral cheeks, and they were washed with NS and vaseline applied per orders. Pt has been calling for bedpan and alternately incontinent of bowel and bladder this shift. Pt has UTI and has PRN Pyridium available. Discharge plans uncertain at present. Continue with plan of care.

## 2018-01-01 NOTE — PROGRESS NOTES
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 ICU course c/b lactic acidosis, acute hypoxic/hypercarbic respiratory failure 2/2 aspiration requiring intubation c/b ARDS, ARF, severe hypotension/vasogenic shock requiring pressors, paroxysmal A.fib, intermittent SVT, and sepsis 2/2 sinusitis. Patient extubated 12/24/17, transferred to medicine 12/25 for ongoing care.       Intentional overdose 12/13 with bipolar disorder, depression, anxiety, etoh abuse, possible benzodiazapine w/d: Overdose with amlodipine, Tylenol, Nyquil and Etoh. PTA drinking 16 oz vodka daily for 1.5 days. Treated with Ca gtt, high dose insulin, and NAC per poison control. Extubated 12/24. Sitter discontinued 12/28 per psych recs. Denies active SI, but given significant psych history, will likely benefit from inpatient psychiatric care. Unfortunately, patient has c.diff and not independent of own cares d/t weakness and deconditioning at this time, so not appropriate for inpatient psych on [facility] currently. Will need stay at rehab before potential transfer to inpatient psych, ideally rehab on [facility] so that psychiatry team can continue to evaluate and help with dispo following stay.   - Continue scheduled remeron 15mg qhs and olanzapine 5mg BID  - Continue prn ativan 1-2mg q4h prn (pt using 3-5x daily)  - Suicide precautions  - Daily thiamine, folic acid, MVI  - If patient attempts to leave, will need to place 72 hr hold  - Will have psych re-evaluate  tomorrow      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 C.diff +12/22. AXR 12/28 no acute findings. Currently on po vancomycin with slow improvement in stooling, unfortunately had to start abx for possible UTI as below. Had decreasing stools over previous 48h, had one loose watery stool today.  - GI following, plan to cont vanco po 125mg QID  - Consider colonoscopy with bx if not getting better  - Obtain KUB XR if acute worsening to eval for toxic megacolon       UTI, dysuria. Patient reports dysuria, increased urinary frequency overnight 12/30. UA with WBC, +LE, neg nitrate. UCx prelim shows candida, also spoke with lab and a gram positive cocci species is also growing (final speciation pending). Most recent UCx with pansensitive enterococcus faecalis. Started macrobid 12/30.   - Cont macrobid for now (started 12/30) - aware less than ideal, but given risk of worsening c.diff, hesitant to start broad spec abx and pt does not appear septic. Follow urine cx.  - Give diflucan 150mg x 1 for candida  - Get wet prep  - Cont pyridium x 3d       Acute pancreatitis, transaminitis. Lipase 1775 12/26. Previously normal. US no stones. MRCP normal 12/27. ALP improving 648 (771), Tbili stable at 1.5. ALT, AST stable. Previously had diffuse abdominal tenderness, this resolved on 12/29. GI following, transaminitis thought to be 2/2 cholestasis of sepsis and critical illness. Tolerating diet as of 12/29.  - GI following  - Regular diet as tolerated, give IVF if acutely decompensates  - CMP tomorrow      ARDS 2/2 aspiration, acute hypoxic/hypercarbic respiratory failure. Initially d/t overdose, V/Q mismatch in setting of ARDS. S/p intubation (initially prone) flolan, solumedrol, diuretics, and Duobneb. Extubated 12/24. Completed steroids 12/28. VSS. Has no required supplemental oxygen since 12/29. Repeat CXR 1/1/18 shows diffuse patchy pulmonary opacities, stable to slightly increased  with pleural effusions slightly increased. Ongoing dry cough, which will likely persist for weeks. No new fevers or hypoxia  - Continue aggressive pulm toilet with acapella and IS  - Tessalon pearls for cough  - PRN duonebs for wheezing      Severe malnutrition. Prealbumin 25 12/28. Very limited PO intake, patient reports due to high volume BM. Would like to avoid NJ at all lengths. PO intake starting to improve after long discussion about TFs on 12/29. Nutrition following and calorie counts improved. Cont regular diet. Appreciate nutrition following. No indication for TF at this time.      HTN. PTA on Norvasc. Currently on coreg 6.25mg BID and clonidine 0.1g TID. BPs stable on current regimen. Cont to monitor and adjust as needed.       Pressure injury. Not present on admission. Bilateral cheek and left forearm. WOCN following, last seen 12/29 with recs to cleanse with saline solution and continue off loading measures.     Deconditioning. In setting of prolonged hospitalization and recent critical illness. Working with PT/OT. PT/OT currently recommending TCU. Dispo as outlined above.       Resolved Hospital Problems and Stable, Chronic Medical Conditions:  Altered mental status. Resolved. Was having waxing and waning mental clarity, which was likely multifactorial including: rapid cycling bipolar with psychotic features, ?drug induced psychosis, ?ICU delirium, ?ongoing infection with c.diff. Has been A&O x 3 over past 72 hours without hallucinations, agitation, or inappropriate comments. Monitor closely.     Likely acute opiate withdrawal. Signs and symptoms of opiate withdrawal noted 12/27, likely 2/2 high fentanyl dosing during intubation (chills, myalgia, diarrhea, diaphoresis, abdominal pain). Although several symptoms can be connected to other etiology (ex. Diarrhea and c diff, abdominal pain and pancreatitis). Started on scheduled opiates 12/27 with improvement, tapered off completely 12/31. No further s/sx  of withdrawal. Cont to monitor for s/sx of withdrawal.     Hypernatremia. Elevated from admission to 12/25, normalized 12/26. Thought 2/2 hypovolemia.    ARF. 2/2 CCB overdose. Cr peak 1.62. C/b volume overload and pulmonary edema, treated with IV lasix. Cr since normalized.     SVT. Presented with HR 190s in setting of critical illness following OD, resolved with IVF.     Paroxysmal a fib. Brief episode of a fib 12/20 with conversion to NSR with metoprolol x2. No further episodes. On coreg as above.    Normocytic anemia. Hgb 10.4 on admit. Drop to 5.8 12/19, s/p 1U PRBC. Smear with marked normochromic, normocytic anemia, very rare RBC fragments and spherocytes. Haptoglobin 63, . Concern for possible alveolar hemorrhage as above. Possibly 2/2 chronic Etoh use. Hgb now stable. Cont folic acid.     Coagulopathy. In the setting of etoh use, poor nutrition. S/p vitamin K. No s/s of acute bleeding. Normalized 12/27.    Hypothyroidism. TSH 5.06, T4 normal in setting of critical illness thus no dose adjustments made. Continue PTA synthroid. Needs repeat TFTS 6 weeks from 12/13.     Incidental MRCP findings. Abnormal soft tissue posterior to the fundus of the stome, similar to 1/2017 CT. Needs follow up as outpatient for biopsy on discharge.     GI ppx. In setting of recent OD. Cont PPI qd.       Diet: regular diet  Fluids: none  DVT Prophylaxis: lovenox   Code Status: FULL    Disposition Plan   Expected discharge: 4 - 7 days; recommended to TCU vs ARU once UTI tx established, dispo plan established.   Information in the above section will display in the discharge planner report.      The patient's care was discussed with the Attending Physician, Dr. Goldstein, Bedside Nurse and Patient.    Negin Hyde [facility]  Internal Medicine Staff Hospitalist Service  [facility]  Pager: [phone]  Please see sticky note for cross cover information    Interval History   Ana Laura is feeling OK today.  Reports that dysuria is improving. Reports that she does have some vaginal itching. She reports that her cough is still present. Continues to be dry without sputum production. No chest pain or shortness of breath. No fevers or chills. Had only 1 bowel movement in the past 48 hours and then had one loose stool today. She denies active SI at this time.     Data reviewed today: I reviewed all medications, new labs and imaging results over the last 24 hours. I personally reviewed labs, imaging, and vitals.     Physical Exam   /77 (BP Location: Right arm)  Pulse 83  Temp 96.7  F (35.9  C) (Oral)  Resp 16  Wt 49.6 kg (109 lb 6.4 oz)  SpO2 96%  BMI 19.38 kg/m2     GENERAL: Alert and oriented x 3. NAD. Resting in bed. Responds appropriately to questions. Flat affect.   HEENT: Anicteric sclera. MMM.   CV: RRR. S1, S2. No m/r/g.   RESPIRATORY: Effort normal on RA. Lungs CTAB - no wheezing, distant in bases, scant crackles in bases.   GI: Abdomen soft, nt/nd. Normoactive bowel sounds.  NEUROLOGICAL: No focal deficits. Moves all extremities.    EXTREMITIES: No peripheral edema. Intact bilateral pedal pulses.   SKIN: No jaundice or rashes. Ecchymosis on L cheek slowly resolving.

## 2018-01-01 NOTE — PLAN OF CARE
Problem: Patient Care Overview  Goal: Plan of Care/Patient Progress Review  Outcome: Improving    Patient remains A&O, pleasant and independently positioning in bed, up with X2; calling for bedpan. Diflucan X1 given for presumptive yeast infection. Performing wet prep this afternoon with PA. Chest xray performed for worsening dry cough; MD happy with results and stated expected after ARDS to have cough for up to 6 weeks. Magnesium replaced. Plan tomorrow to pursue discharge planning to include TCU or ARU followed by inpatient psych (assuming that will still be their recommendation).

## 2018-01-02 ENCOUNTER — APPOINTMENT (OUTPATIENT)
Dept: GENERAL RADIOLOGY | Facility: CLINIC | Age: 48
DRG: 917 | End: 2018-01-02
Attending: NURSE PRACTITIONER
Payer: COMMERCIAL

## 2018-01-02 ENCOUNTER — APPOINTMENT (OUTPATIENT)
Dept: PHYSICAL THERAPY | Facility: CLINIC | Age: 48
DRG: 917 | End: 2018-01-02
Attending: SURGERY
Payer: COMMERCIAL

## 2018-01-02 LAB
ALBUMIN SERPL-MCNC: 2.6 G/DL (ref 3.4–5)
ALP SERPL-CCNC: 586 U/L (ref 40–150)
ALT SERPL W P-5'-P-CCNC: 51 U/L (ref 0–50)
ANION GAP SERPL CALCULATED.3IONS-SCNC: 7 MMOL/L (ref 3–14)
AST SERPL W P-5'-P-CCNC: 48 U/L (ref 0–45)
BACTERIA SPEC CULT: ABNORMAL
BACTERIA SPEC CULT: ABNORMAL
BILIRUB SERPL-MCNC: 1.6 MG/DL (ref 0.2–1.3)
BUN SERPL-MCNC: 15 MG/DL (ref 7–30)
CALCIUM SERPL-MCNC: 9.4 MG/DL (ref 8.5–10.1)
CHLORIDE SERPL-SCNC: 101 MMOL/L (ref 94–109)
CO2 SERPL-SCNC: 26 MMOL/L (ref 20–32)
CREAT SERPL-MCNC: 0.65 MG/DL (ref 0.52–1.04)
ERYTHROCYTE [DISTWIDTH] IN BLOOD BY AUTOMATED COUNT: 18.6 % (ref 10–15)
GFR SERPL CREATININE-BSD FRML MDRD: >90 ML/MIN/1.7M2
GLUCOSE BLDC GLUCOMTR-MCNC: 103 MG/DL (ref 70–99)
GLUCOSE BLDC GLUCOMTR-MCNC: 120 MG/DL (ref 70–99)
GLUCOSE BLDC GLUCOMTR-MCNC: 121 MG/DL (ref 70–99)
GLUCOSE BLDC GLUCOMTR-MCNC: 152 MG/DL (ref 70–99)
GLUCOSE BLDC GLUCOMTR-MCNC: 163 MG/DL (ref 70–99)
GLUCOSE BLDC GLUCOMTR-MCNC: 166 MG/DL (ref 70–99)
GLUCOSE BLDC GLUCOMTR-MCNC: 79 MG/DL (ref 70–99)
GLUCOSE SERPL-MCNC: 110 MG/DL (ref 70–99)
HCT VFR BLD AUTO: 29.8 % (ref 35–47)
HGB BLD-MCNC: 9.1 G/DL (ref 11.7–15.7)
LACTATE BLD-SCNC: 2.3 MMOL/L (ref 0.7–2)
Lab: ABNORMAL
MAGNESIUM SERPL-MCNC: 1.8 MG/DL (ref 1.6–2.3)
MCH RBC QN AUTO: 31.9 PG (ref 26.5–33)
MCHC RBC AUTO-ENTMCNC: 30.5 G/DL (ref 31.5–36.5)
MCV RBC AUTO: 105 FL (ref 78–100)
PHOSPHATE SERPL-MCNC: 3.8 MG/DL (ref 2.5–4.5)
PLATELET # BLD AUTO: 355 10E9/L (ref 150–450)
POTASSIUM SERPL-SCNC: 4.4 MMOL/L (ref 3.4–5.3)
PROCALCITONIN SERPL-MCNC: 0.2 NG/ML
PROT SERPL-MCNC: 6.7 G/DL (ref 6.8–8.8)
RBC # BLD AUTO: 2.85 10E12/L (ref 3.8–5.2)
SODIUM SERPL-SCNC: 134 MMOL/L (ref 133–144)
SPECIMEN SOURCE: ABNORMAL
WBC # BLD AUTO: 13 10E9/L (ref 4–11)

## 2018-01-02 PROCEDURE — 25000128 H RX IP 250 OP 636: Performed by: INTERNAL MEDICINE

## 2018-01-02 PROCEDURE — 84100 ASSAY OF PHOSPHORUS: CPT | Performed by: INTERNAL MEDICINE

## 2018-01-02 PROCEDURE — G0463 HOSPITAL OUTPT CLINIC VISIT: HCPCS

## 2018-01-02 PROCEDURE — 71045 X-RAY EXAM CHEST 1 VIEW: CPT

## 2018-01-02 PROCEDURE — 36592 COLLECT BLOOD FROM PICC: CPT | Performed by: STUDENT IN AN ORGANIZED HEALTH CARE EDUCATION/TRAINING PROGRAM

## 2018-01-02 PROCEDURE — 83735 ASSAY OF MAGNESIUM: CPT | Performed by: STUDENT IN AN ORGANIZED HEALTH CARE EDUCATION/TRAINING PROGRAM

## 2018-01-02 PROCEDURE — 25000132 ZZH RX MED GY IP 250 OP 250 PS 637: Performed by: STUDENT IN AN ORGANIZED HEALTH CARE EDUCATION/TRAINING PROGRAM

## 2018-01-02 PROCEDURE — 25000132 ZZH RX MED GY IP 250 OP 250 PS 637: Performed by: NURSE PRACTITIONER

## 2018-01-02 PROCEDURE — 83605 ASSAY OF LACTIC ACID: CPT | Performed by: INTERNAL MEDICINE

## 2018-01-02 PROCEDURE — 85027 COMPLETE CBC AUTOMATED: CPT | Performed by: STUDENT IN AN ORGANIZED HEALTH CARE EDUCATION/TRAINING PROGRAM

## 2018-01-02 PROCEDURE — 97530 THERAPEUTIC ACTIVITIES: CPT | Mod: GP | Performed by: REHABILITATION PRACTITIONER

## 2018-01-02 PROCEDURE — 25000132 ZZH RX MED GY IP 250 OP 250 PS 637

## 2018-01-02 PROCEDURE — 87040 BLOOD CULTURE FOR BACTERIA: CPT | Performed by: NURSE PRACTITIONER

## 2018-01-02 PROCEDURE — 84145 PROCALCITONIN (PCT): CPT | Performed by: NURSE PRACTITIONER

## 2018-01-02 PROCEDURE — 99233 SBSQ HOSP IP/OBS HIGH 50: CPT | Performed by: INTERNAL MEDICINE

## 2018-01-02 PROCEDURE — 12000001 ZZH R&B MED SURG/OB UMMC

## 2018-01-02 PROCEDURE — 36592 COLLECT BLOOD FROM PICC: CPT | Performed by: NURSE PRACTITIONER

## 2018-01-02 PROCEDURE — 40000193 ZZH STATISTIC PT WARD VISIT: Performed by: REHABILITATION PRACTITIONER

## 2018-01-02 PROCEDURE — 36592 COLLECT BLOOD FROM PICC: CPT | Performed by: INTERNAL MEDICINE

## 2018-01-02 PROCEDURE — 99207 ZZC APP CREDIT; MD BILLING SHARED VISIT: CPT | Performed by: PHYSICIAN ASSISTANT

## 2018-01-02 PROCEDURE — 80053 COMPREHEN METABOLIC PANEL: CPT | Performed by: STUDENT IN AN ORGANIZED HEALTH CARE EDUCATION/TRAINING PROGRAM

## 2018-01-02 PROCEDURE — 25000125 ZZHC RX 250: Performed by: STUDENT IN AN ORGANIZED HEALTH CARE EDUCATION/TRAINING PROGRAM

## 2018-01-02 PROCEDURE — 97116 GAIT TRAINING THERAPY: CPT | Mod: GP | Performed by: REHABILITATION PRACTITIONER

## 2018-01-02 PROCEDURE — 25000132 ZZH RX MED GY IP 250 OP 250 PS 637: Performed by: INTERNAL MEDICINE

## 2018-01-02 PROCEDURE — 25000132 ZZH RX MED GY IP 250 OP 250 PS 637: Performed by: PHYSICIAN ASSISTANT

## 2018-01-02 PROCEDURE — 84132 ASSAY OF SERUM POTASSIUM: CPT | Performed by: STUDENT IN AN ORGANIZED HEALTH CARE EDUCATION/TRAINING PROGRAM

## 2018-01-02 PROCEDURE — 40000901 ZZH STATISTIC WOC PT EDUCATION, 0-15 MIN

## 2018-01-02 RX ORDER — LORAZEPAM 1 MG/1
1-2 TABLET ORAL EVERY 6 HOURS PRN
Status: DISCONTINUED | OUTPATIENT
Start: 2018-01-02 | End: 2018-01-05

## 2018-01-02 RX ADMIN — MIRTAZAPINE 15 MG: 15 TABLET, FILM COATED ORAL at 21:35

## 2018-01-02 RX ADMIN — NITROFURANTOIN MONOHYDRATE/MACROCRYSTALLINE 100 MG: 25; 75 CAPSULE ORAL at 08:06

## 2018-01-02 RX ADMIN — CLONIDINE HYDROCHLORIDE 0.1 MG: 0.1 TABLET ORAL at 08:06

## 2018-01-02 RX ADMIN — Medication 125 MG: at 08:08

## 2018-01-02 RX ADMIN — OLANZAPINE 5 MG: 5 TABLET, ORALLY DISINTEGRATING ORAL at 19:27

## 2018-01-02 RX ADMIN — LORAZEPAM 1 MG: 1 TABLET ORAL at 19:30

## 2018-01-02 RX ADMIN — IBUPROFEN 600 MG: 200 TABLET, FILM COATED ORAL at 08:12

## 2018-01-02 RX ADMIN — CARVEDILOL 6.25 MG: 6.25 TABLET, FILM COATED ORAL at 08:06

## 2018-01-02 RX ADMIN — FOLIC ACID 1 MG: 1 TABLET ORAL at 08:07

## 2018-01-02 RX ADMIN — LEVOTHYROXINE SODIUM 50 MCG: 25 TABLET ORAL at 08:06

## 2018-01-02 RX ADMIN — ENOXAPARIN SODIUM 40 MG: 40 INJECTION SUBCUTANEOUS at 12:23

## 2018-01-02 RX ADMIN — SODIUM CHLORIDE, PRESERVATIVE FREE 5 ML: 5 INJECTION INTRAVENOUS at 20:19

## 2018-01-02 RX ADMIN — SODIUM CHLORIDE, PRESERVATIVE FREE 5 ML: 5 INJECTION INTRAVENOUS at 22:15

## 2018-01-02 RX ADMIN — PANTOPRAZOLE SODIUM 40 MG: 40 TABLET, DELAYED RELEASE ORAL at 08:07

## 2018-01-02 RX ADMIN — Medication 2 G: at 12:24

## 2018-01-02 RX ADMIN — LORAZEPAM 1 MG: 1 TABLET ORAL at 12:39

## 2018-01-02 RX ADMIN — CLONIDINE HYDROCHLORIDE 0.1 MG: 0.1 TABLET ORAL at 19:27

## 2018-01-02 RX ADMIN — OLANZAPINE 5 MG: 5 TABLET, ORALLY DISINTEGRATING ORAL at 08:07

## 2018-01-02 RX ADMIN — BENZONATATE 100 MG: 100 CAPSULE, LIQUID FILLED ORAL at 08:05

## 2018-01-02 RX ADMIN — Medication 125 MG: at 12:24

## 2018-01-02 RX ADMIN — PHENAZOPYRIDINE HYDROCHLORIDE 100 MG: 100 TABLET, COATED ORAL at 12:23

## 2018-01-02 RX ADMIN — Medication 125 MG: at 16:07

## 2018-01-02 RX ADMIN — CARVEDILOL 6.25 MG: 6.25 TABLET, FILM COATED ORAL at 19:27

## 2018-01-02 RX ADMIN — CLONIDINE HYDROCHLORIDE 0.1 MG: 0.1 TABLET ORAL at 14:32

## 2018-01-02 RX ADMIN — Medication 100 MG: at 08:07

## 2018-01-02 RX ADMIN — PHENAZOPYRIDINE HYDROCHLORIDE 100 MG: 100 TABLET, COATED ORAL at 08:06

## 2018-01-02 RX ADMIN — Medication 125 MG: at 19:39

## 2018-01-02 RX ADMIN — PHENAZOPYRIDINE HYDROCHLORIDE 100 MG: 100 TABLET, COATED ORAL at 18:26

## 2018-01-02 NOTE — PLAN OF CARE
Problem: Patient Care Overview  Goal: Plan of Care/Patient Progress Review  Outcome: No Change  [id number]: A&O. VSS on RA. Assist x 1 for bedpan, calls appropriately. Void x 2, orange urine. BM x 1. Generalized back pain, managed with repositioning. Triple lumen PICC, SL. Sleeping comfortably most of overnight. Plan for psych re-evaluation today.
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
## 2018-01-02 NOTE — PROGRESS NOTES
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 ICU course c/b lactic acidosis, acute hypoxic/hypercarbic respiratory failure 2/2 aspiration requiring intubation c/b ARDS, ARF, severe hypotension/vasogenic shock requiring pressors, paroxysmal A.fib, intermittent SVT, and sepsis 2/2 sinusitis. Patient extubated 12/24/17, transferred to medicine 12/25 for ongoing care.       Intentional overdose 12/13 with bipolar disorder, depression, anxiety, etoh abuse, possible benzodiazapine w/d: Overdose with amlodipine, Tylenol, Nyquil and Etoh. PTA drinking 16 oz vodka daily for 1.5 days. Treated with Ca gtt, high dose insulin, and NAC per poison control. Extubated 12/24. Sitter discontinued 12/28 per psych recs. Denies active SI, but given significant psych history, will likely benefit from inpatient psychiatric care. Unfortunately, patient has c.diff and not independent of own cares d/t weakness and deconditioning at this time, so not appropriate for inpatient psych on [facility] currently. Will need stay at rehab before potential transfer to inpatient psych, ideally rehab on [facility] so that psychiatry team can continue to evaluate and help with dispo following stay or med/psych unit.   - Continue scheduled remeron 15mg qhs and olanzapine 5mg BID  - Decrease prn ativan to 1-2mg q6h prn, then to q8h prn in 3 days   - Suicide precautions  - Daily thiamine, folic acid, MVI  - If patient attempts to leave, will need to place 72 hr hold  -  Psychiatry to re-evaluate today (consult placed)      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 C.diff +12/22. AXR 12/28 no acute findings. Currently on po vancomycin with slow improvement in stooling, unfortunately had to start abx for UTI as below. Has had decreasing stools while on vancomycin, 2 loose stools daily over past few days.  - GI following, plan to cont vanco po 125mg QID - will need long taper  - Consider colonoscopy with bx if not getting better  - Obtain KUB XR if acute worsening to eval for toxic megacolon       Dysuria. Patient reports dysuria, increased urinary frequency overnight 12/30. UA with WBC, +LE, neg nitrate. UCx prelim shows candida, also spoke with lab and a gram positive cocci species is growing - they then retrated this and resulted the culture as mixed urogenital mina. Started macrobid 12/30.   - Stop macrobid  - Cont pyridium through today to complete 3d    Vaginal candidiasis. Wet prep pos for yeast 1/1/18. No trich or BV. S/p diflucan 150mg x 1 on 1/1.       Acute pancreatitis, transaminitis. Lipase 1775 12/26. Previously normal. US no stones. MRCP normal 12/27. ALP improving 586 (648), Tbili stable ~1.5. ALT, AST stable. Previously had diffuse abdominal tenderness, this resolved on 12/29. GI following, transaminitis thought to be 2/2 cholestasis of sepsis and critical illness. Tolerating diet as of 12/29.  - GI following  - Regular diet as tolerated, give IVF if acutely decompensates  - CMP qMWF      ARDS 2/2 aspiration, acute hypoxic/hypercarbic respiratory failure. Initially d/t overdose, V/Q mismatch in setting of ARDS. S/p intubation (initially prone) flolan, solumedrol, diuretics, and Duobneb. Extubated 12/24. Completed steroids 12/28. VSS. Has no required supplemental oxygen since 12/29. Repeat CXR 1/1/18 shows diffuse patchy pulmonary opacities, stable to slightly increased with pleural effusions slightly increased. Ongoing  dry cough, which will likely persist for weeks. Reports improvement in cough over past 24hr with use of IS and acapella.  No new fevers or hypoxia  - Continue aggressive pulm toilet with acapella and IS  - Tessalon pearls for cough  - PRN duonebs for wheezing      Severe malnutrition. Prealbumin 25 12/28. Very limited PO intake, patient reports due to high volume BM. Would like to avoid NJ at all lengths. PO intake starting to improve after long discussion about TFs on 12/29. Nutrition following and calorie counts improved. Cont regular diet. Appreciate nutrition following. No indication for TF at this time.      HTN. PTA on Norvasc. Currently on coreg 6.25mg BID and clonidine 0.1g TID. BPs stable on current regimen. Cont to monitor and adjust as needed.       Pressure injury. Not present on admission. Bilateral cheek and left forearm. WOCN following, last saw today 1/2 with recs to wash BID with saline and gauze and gently rub vaseline on cheek wounds, and was left forearmwith recs to cleanse with saline solution. Continue off loading measures.      Deconditioning. In setting of prolonged hospitalization and recent critical illness. Working with PT/OT. PT/OT currently recommending TCU. Will consult PMR for possible ARU placement. Dispo as outlined above.       Resolved Hospital Problems and Stable, Chronic Medical Conditions:  Altered mental status. Resolved. Was having waxing and waning mental clarity, which was likely multifactorial including: rapid cycling bipolar with psychotic features, ?drug induced psychosis, ?ICU delirium, ?ongoing infection with c.diff. Has been A&O x 3 over past 72 hours without hallucinations, agitation, or inappropriate comments. Monitor closely.      Likely acute opiate withdrawal. Signs and symptoms of opiate withdrawal noted 12/27, likely 2/2 high fentanyl dosing during intubation (chills, myalgia, diarrhea, diaphoresis, abdominal pain). Although several symptoms can be connected to  other etiology (ex. Diarrhea and c diff, abdominal pain and pancreatitis). Started on scheduled opiates 12/27 with improvement, tapered off completely 12/31. No further s/sx of withdrawal. Cont to monitor for s/sx of withdrawal.      Hypernatremia. Elevated from admission to 12/25, normalized 12/26. Thought 2/2 hypovolemia.     ARF. 2/2 CCB overdose. Cr peak 1.62. C/b volume overload and pulmonary edema, treated with IV lasix. Cr since normalized.      SVT. Presented with HR 190s in setting of critical illness following OD, resolved with IVF.      Paroxysmal a fib. Brief episode of a fib 12/20 with conversion to NSR with metoprolol x2. No further episodes. On coreg as above.     Normocytic anemia. Hgb 10.4 on admit. Drop to 5.8 12/19, s/p 1U PRBC. Smear with marked normochromic, normocytic anemia, very rare RBC fragments and spherocytes. Haptoglobin 63, . Concern for possible alveolar hemorrhage as above. Possibly 2/2 chronic Etoh use. Hgb now stable. Cont folic acid.      Coagulopathy. In the setting of etoh use, poor nutrition. S/p vitamin K. No s/s of acute bleeding. Normalized 12/27.     Hypothyroidism. TSH 5.06, T4 normal in setting of critical illness thus no dose adjustments made. Continue PTA synthroid. Needs repeat TFTS 6 weeks from 12/13.      Incidental MRCP findings. Abnormal soft tissue posterior to the fundus of the stome, similar to 1/2017 CT. Needs follow up as outpatient for biopsy on discharge.      GI ppx. In setting of recent OD. Cont PPI qd.       Diet: regular diet  Fluids: none  DVT Prophylaxis: lovenox   Code Status: FULL    Disposition Plan   Expected discharge: pending; recommended to ARU vs TCU vs med/psych unit once dispo plan established.   Information in the above section will display in the discharge planner report.      The patient's care was discussed with the Attending Physician, Dr. Lopez, Bedside Nurse, Care Coordinator/ and Patient.    Negin Hyde  Stew  Internal Medicine Staff Hospitalist Service  [facility]  Pager: [phone]  Please see sticky note for cross cover information    Interval History   Ana Laura is doing ok today. She reports her cough improving. Continues to have some dysuria. Denies any chest pain, sob, or dyspnea. Reports cough improving. Continues to have 1-2 loose stools daily. Tolerating more po intake. Denies active SI.     Data reviewed today: I reviewed all medications, new labs and imaging results over the last 24 hours. I personally reviewed labs, imaging, and vitals.     Physical Exam   /71 (BP Location: Right arm)  Pulse 100  Temp 97.6  F (36.4  C) (Oral)  Resp 16  Wt 49.6 kg (109 lb 6.4 oz)  SpO2 98%  BMI 19.38 kg/m2     GENERAL: A&O x 3. NAD. Resting in bed. Appears deconditioned.  HEENT: Anicteric sclera. MMM.   CV: RRR.   RESPIRATORY: Effort normal on RA. Lungs CTAB - no wheezing, distant sounds in bases.   GI: Abdomen soft and non distended with normoactive bowel sounds present in all quadrants. No tenderness, rebound, guarding.   NEUROLOGICAL: No focal deficits. Moves all extremities.    EXTREMITIES: No peripheral edema. Intact bilateral pedal pulses.   SKIN: No jaundice. No rashes. Ecchymoses on both cheeks, no open areas.

## 2018-01-02 NOTE — PLAN OF CARE
Problem: Patient Care Overview  Goal: Plan of Care/Patient Progress Review  Outcome: Improving  Pt A&Ox4.  VSS on RA.  Moves independently in bed.  Able to make needs known.  L triple lumen PICC SL.  L PIV removed, catheter intact and site dressed.  L and R arm mepilex CDI.  L cheek wound cleansed and vaseline applied.  C/o back pain tolerable with PRN advil.  C/o anxiety, tolerable with 1 mg ativan and 2 mg before bed.  Poor appetite tonight, ate 25%.  Two visitors tonight.  Intermittently incontinent of bowel and bladder, otherwise uses bedpan.  K+ replaced per protocol and recheck scheduled tomorrow.  Continue to monitor and follow POC.

## 2018-01-02 NOTE — PROGRESS NOTES
GASTROENTEROLOGY PROGRESS NOTE  Date of Service: 01/02/2018    ASSESSMENT:  Ana Laura Wharton is a 47 year old female with past medical history of hypertension, anxiety, refractory bipolar disorder with multiple suicide attempts, hypothyroidism and recurrent CDI who was admitted after intentional overdose with amlodipine, NyQuil, acetaminophen and vodka. Her hospital course has been complicated by prolonged intubation with pneumonia, treatment with NAC for acetaminophen overdose/elevated liver tests and recurrent CDI after antibiotic administration (cefepime, vancomycin, ceftriaxone and metronidazole). She has had recurrent C diff typically provoked with antibiotics. Bowel movements decreasing with vancomycin.    C Diff History:  - 8/2015: FMT  - 4/2016: Had UTI, given abx, developed C diff and treated with oral vancomycin for 4 months  - 5/2016: C diff negative  - 6/2016: C diff positive  - 8/8/2016: FMT.  Developed UTI and treated with antibiotics within a week of receiving FMT  - 8/13/2016: C diff positive  - 8/21/2016: C diff positive  - 9/2016: Given 10 day course of fidoxamicin with improvement in symptoms  - 10/11/2016: C diff negative  - 1/2017: Had cystits, given abx with vancomycin ppx  - 3/9/2017: C diff positive (was admitted for suicide attempt and had been off medications for weeks to months).  Treatment started with vancomycin taper  - 3/23/2017: With persistent diarrhea, switched vanc taper to fidoxamicin  - 12/13/2017: Current admission, per report had formed stool on admission  - 12/14/2017: Started cefepime and vancomycin IV  - 12/17/2017: Switched to ceftriaxone and flagyl for aspiration PNA  - 12/22/2017: C diff positive, started on oral vancomycin, in addition to flagyl, cefepime, and vanc IV  - 12/24/2017: Cefepime and vanc IV stopped, vanc PO and flagyl continued  - 12/26/2017: Flagyl stopped  - 12/30/2017: TMP/SMX x 2 days; stopped    RECOMMENDATIONS:   - Continue vancomycin 125 mg QID   -  Monitoring of stool counts   - GI will continue to follow along with you    The patient was discussed and plan agreed upon with GI staff, Dr. Carlin.    Janelle Swain MD  GI Consult Service  _______________________________________________________________  S: No acute events overnight. Patient reports two bowel movements daily - liquid. Overall abdominal cramping and bowel movements improved, but wonders if she needs fidoxamicin. No nausea, vomiting, chest pain, shortness of breath.     O:  Blood pressure 129/78, pulse 100, temperature 96.9  F (36.1  C), temperature source Oral, resp. rate 15, weight 49.6 kg (109 lb 6.4 oz), SpO2 97 %.    Gen: Alert, NAD  HEENT: NC/AT  CV: RRR  Lungs: No increased WOB  Abd: Soft, NT, ND, + BS  Skin: No rash  MS: No edema  Neuro: Alert and oriented; no focal deficits      LABS:  BMP  Recent Labs  Lab 01/02/18  0835 01/01/18  0617 12/31/17  0830 12/30/17  1006 12/29/17  0317    137 138  --  138   POTASSIUM 4.4 3.8 4.1 3.8 4.1   CHLORIDE 101 102 105  --  104   ISAURO 9.4 9.2 8.8  --  8.4*   CO2 26 24 28  --  27   BUN 15 16 18  --  16   CR 0.65 0.66 0.55  --  0.63   * 149* 109*  --  90     CBC  Recent Labs  Lab 01/02/18  0835 01/01/18  0617 12/31/17  0830 12/29/17  0317   WBC 13.0* 13.6* 14.6* 17.2*   RBC 2.85* 2.86* 2.85* 2.69*   HGB 9.1* 9.2* 9.2* 8.5*   HCT 29.8* 30.0* 29.7* 27.3*   * 105* 104* 102*   MCH 31.9 32.2 32.3 31.6   MCHC 30.5* 30.7* 31.0* 31.1*   RDW 18.6* 18.7* 18.8* 19.8*    373 412 358     INR  Recent Labs  Lab 01/01/18  0617 12/27/17  2103   INR 1.07 1.09     LFTs  Recent Labs  Lab 01/02/18  0835 01/01/18  0617 12/31/17  0830 12/29/17  0317   ALKPHOS 586* 648* 771* 930*   AST 48* 61* 75* 106*   ALT 51* 65* 82* 126*   BILITOTAL 1.6* 1.5* 1.5* 2.1*   PROTTOTAL 6.7* 6.5* 6.4* 5.7*   ALBUMIN 2.6* 2.6* 2.5* 2.2*      PANCNo lab results found in last 7 days.

## 2018-01-02 NOTE — PROGRESS NOTES
[facility] Nurse Inpatient Adult Pressure InjuryAssessment    Followup Assessment  Reason for consultation: Evaluate and treat multiple pressure injuries    Assessment:   Left cheek wound due to Pressure Injury  Pressure Injury appears to be a deep tissue injury that is now improving. Present on admission No  This is a Medical Device Related Pressure Injury (MDRPI) due to ETT securement device    Right cheek wound due to Pressure Injury  Resolved  This is a Medical Device Related Pressure Injury (MDRPI) due to ETT securement device    Left proximal forearm wound due to Pressure Injury  Pressure Injury is unstageable  Present on admission No  This is a Medical Device Related Pressure Injury (MDRPI) due to IV hub while proning    Left distal forearm wound due to Pressure Injury  Pressure Injury is Stage 2. Present on admission No  This is a Medical Device Related Pressure Injury (MDRPI) due to IV cap.       Contributing factor of the pressure injury: immobility, proning (no longer being proned)    Photo: see individual wound assessments below    TREATMENT  PLAN    Bilateral cheek wounds: Wash twice daily with saline and gauze. Gently rub Vaseline onto wounds to keep moist.   Left forearm (at AC): wash every other day with wound cleanser and gauze, dry thoroughly. Cover with thin hydrocolloid dressing (Comfeel) that has been cut to fit.  Change every 3 days and as needed     Orders Reviewed  WO Nurse follow-up plan:twice weekly  Nursing to notify the Provider(s) and re-consult the [facility] Nurse if wound(s) deteriorates or new skin concern.    Patient History  According to provider note(s):    Ana Laura Wharton is a 48 yo female with hx of bipolar disorder with depression, polysubstance abuse, & prior suicide attempts, admitted to [facility] MICU  Following calcium channel blocker (CCB) overdose, tylenol overdose & alcohol overdose. Course complicated by lactic acidosis, hypoventilation, acute renal failure, & severe hypotension/vasogenic  shock. Currently intubated, sedated, & proned.      Patient proned for lung recruitment last from 17 at 1315 to 17 at 0520 (per nursing notes). On 17 ET tabs changed and pressure injury noted under old tabs. WOC consult placed. Left forearm pressure injury noted under IV during WOC skin assessment.    Objective Data     Current Diet/ Nutrition:    Active Diet Order      Regular Diet Adult    Output:   I/O last 3 completed shifts:  In: 480 [P.O.:480]  Out: 1700 [Urine:1700]    Skin Assessment: Sujit Sujit Score  Av.1  Min: 8  Max: 20                               Labs:   Recent Labs  Lab 18  0835 18  0617  17  0317   HGB 9.1* 9.2*  < > 8.5*   INR  --  1.07  --   --    WBC 13.0* 13.6*  < > 17.2*   CRP  --   --   --  14.0*   < > = values in this interval not displayed.    Physical Exam    Skin assessment:   Focused skin inspection: anterior body    Wound Location:  Left cheek  17 Left cheek18         Wound History: see history above.  Measurements (length x width x depth, in cm) 1.5 cm x 2.2 cm  x  0 cm   Wound Base: fading maroon and deep purple under loose layer of devitalized epithelium. Wound edges diffuse  Palpation of the wound bed: firm   Periwound skin: intact  Color: normal and consistent with surrounding tissue  Temperature: normal   Drainage: none  Description of drainage: none  Odor: none  Pain:none       Wound Location:  Right cheek    resolved      Wound Location:  Left proximal forearm            left forearm  Wound History: Noted during skin assessment. Wound was found under hub of peripheral IV site  Measurements (length x width x depth, in cm) 1.7 cm x 0.5 cm  x  0.4 cm   Wound Base:  100% Black leathery eschar  Palpation of the wound bed: induration  Periwound skin: up to 2 cm of pink erythema   Temperature: normal   Drainage: none  Description of drainage: none  Odor: none  Pain:none    Wound Location:  Left distal forearm  (see picture  above)  Wound History: Noted during skin assessment. Wound was found under hub of peripheral IV site  Measurements (length x width x depth, in cm) 0.5 cm x 0.4 cm  x  0.01 cm   Wound Base: resurfaced epidermis  Palpation of the wound bed: normal   Periwound skin: intact  Color: normal and consistent with surrounding tissue  Temperature: normal   Drainage: none  Description of drainage: none  Odor: none  Pain: none      Interventions  Current support surface: Standard  Atmosair    Current off-loading measures: Pillows under calves  Visual inspection of wound(s) completed  Wound Care:was done per plan of care    Cleansing with saline solution  Supplies: Placed at Bedside  Pt education on wound treatment plan, healing   Discussed plan of care with Nurse

## 2018-01-02 NOTE — PLAN OF CARE
"Problem: Patient Care Overview  Goal: Plan of Care/Patient Progress Review  Discharge Planner PT   Patient plan for discharge: TCU or ARU, \"whatever my insurance covers\"  Current status: Patient demonstrated limited activity tolerance but bed mobility and transfers were SBA to CGA. Pt was CGA to min assist for very short gait trials with w/c follow. Dyspnea on exertion noted during all activities.  Performed sit<>stands with repeated education.  Barriers to return to prior living situation: poor activity tolerance, SOB, functional weakness, increased dependence for transfers and gait  Recommendations for discharge: TCU, possibly ARU depending progress, activity tolerance and complexity  Rationale for recommendations: Patient significantly below baseline functional status and demonstrates poor tolerance to upright activities        "

## 2018-01-02 NOTE — PROGRESS NOTES
Call made to intake at  ARU/TCU. Patient has been declined but will review her.     Call placed to in psych to see if patient would be able to DC to [facility] Psych medicine unit. No beds available at [facility] with no anticipated DC's.     Social Work Services Progress Note  Hospital Day: 20  Date of Initial Social Work Evaluation:  12/14/17  Collaborated with:  HAMIDA Kam, Lead [facility],  TCU     Data:  Per Chart review, SW continues to follow patient for DC planning.     Intervention:  FELICIA received report from HAMIDA Kam who notified writer that patient is ready to DC. Per chart review Psychiatry had been recommending that patient d/t to inpatient psych after TCU stay. D/t patient being weak, SW made referral to [facility] Psychiatry medicine unit. FELICIA spoke with Dionicio in intake who reported after review, patient would not be appropriate for Psychiatry d/t not being suicidal. Dionicoi did recommend [facility] on Psych as an alternative. However per chart review, DC recommendation has now changed upon most recent Psychiatry evaluation with patient. Please see most recent note from psychiatry dated 1/2/18 that states patient no longer needs to DC to inpatient psych and instead the recommendation would be DC to a TCU and then an intensive day treatment program. Per chart review, pt appears to be in agreement with this DC plan.    Met with patient at bedside and patient stated she is not interested in meeting with a financial counselor at this time because she believes she would not qualify for MA. Patient stated she previously has applied for this. Patient reported that she believes her [employer] insurance is going to remain active through her husbands insurer. FELICIA spoke with patient about DC planning and psychiatry rec. Patient is in agreement with plan to DC to a TCU and then to do an intensive day treatment program. SW notified patient that [facility] has declined patient previously but reviewing patient with new psych recs made today. Patient requested referral also be sent to [facility] and [facility], as both facilities are close to patients home. SW did send referrals to both facilities.     Referrals [facility]: Declined patient but new pysch recommendations made and are reviewing referral.   2. [facility]: Referral sent  3. [facility] Referral sent        Assessment:  Pt's worries addressed though pt appears intent on discharging home.  Pt may appear to lack insight into her deficits as she again tells writer she has no one that can help her at home.  Pt pleasant though has a flat affect.     Plan:    Anticipated Disposition:  TCU then intensive day treatment program    Barriers to d/c plan:  hx. Of S.I./MH/CD     Follow Up:  FELICIA will continue to follow.     MARGARITA Fan  [address] coverage   Phone: [phone]  Pager: [phone]

## 2018-01-02 NOTE — PLAN OF CARE
"Problem: Patient Care Overview  Goal: Plan of Care/Patient Progress Review  Outcome: No Change  VSS, A&O X4. Denies SI.  Feeling \"well\" this morning and did not want any Ativan.  At 1200 she was anxious and requested 1 mg po Ativan.  Order frequency decreased from every 4 hours to every 6.  Psych consult today.  Recommending med psych unit or a rehab center with psychiatric consultation and after appropriate physical rehabilitation I would recommend an intensive day treatment program.  Tolerating po intake. See wound care note for update on wound care.        "

## 2018-01-03 LAB
ANION GAP SERPL CALCULATED.3IONS-SCNC: 10 MMOL/L (ref 3–14)
BUN SERPL-MCNC: 11 MG/DL (ref 7–30)
CALCIUM SERPL-MCNC: 9.2 MG/DL (ref 8.5–10.1)
CHLORIDE SERPL-SCNC: 103 MMOL/L (ref 94–109)
CO2 SERPL-SCNC: 26 MMOL/L (ref 20–32)
CREAT SERPL-MCNC: 0.63 MG/DL (ref 0.52–1.04)
GFR SERPL CREATININE-BSD FRML MDRD: >90 ML/MIN/1.7M2
GLUCOSE SERPL-MCNC: 106 MG/DL (ref 70–99)
INR PPP: 1.04 (ref 0.86–1.14)
LACTATE BLD-SCNC: 0.9 MMOL/L (ref 0.7–2)
MAGNESIUM SERPL-MCNC: 1.7 MG/DL (ref 1.6–2.3)
PHOSPHATE SERPL-MCNC: 4.4 MG/DL (ref 2.5–4.5)
POTASSIUM SERPL-SCNC: 4 MMOL/L (ref 3.4–5.3)
SODIUM SERPL-SCNC: 138 MMOL/L (ref 133–144)

## 2018-01-03 PROCEDURE — 40000802 ZZH SITE CHECK

## 2018-01-03 PROCEDURE — 25000132 ZZH RX MED GY IP 250 OP 250 PS 637: Performed by: INTERNAL MEDICINE

## 2018-01-03 PROCEDURE — 25000128 H RX IP 250 OP 636: Performed by: INTERNAL MEDICINE

## 2018-01-03 PROCEDURE — 83605 ASSAY OF LACTIC ACID: CPT | Performed by: INTERNAL MEDICINE

## 2018-01-03 PROCEDURE — 25000132 ZZH RX MED GY IP 250 OP 250 PS 637: Performed by: PHYSICIAN ASSISTANT

## 2018-01-03 PROCEDURE — 36592 COLLECT BLOOD FROM PICC: CPT | Performed by: INTERNAL MEDICINE

## 2018-01-03 PROCEDURE — 85610 PROTHROMBIN TIME: CPT | Performed by: STUDENT IN AN ORGANIZED HEALTH CARE EDUCATION/TRAINING PROGRAM

## 2018-01-03 PROCEDURE — 25000125 ZZHC RX 250: Performed by: STUDENT IN AN ORGANIZED HEALTH CARE EDUCATION/TRAINING PROGRAM

## 2018-01-03 PROCEDURE — 12000001 ZZH R&B MED SURG/OB UMMC

## 2018-01-03 PROCEDURE — 25000132 ZZH RX MED GY IP 250 OP 250 PS 637: Performed by: STUDENT IN AN ORGANIZED HEALTH CARE EDUCATION/TRAINING PROGRAM

## 2018-01-03 PROCEDURE — 83735 ASSAY OF MAGNESIUM: CPT | Performed by: STUDENT IN AN ORGANIZED HEALTH CARE EDUCATION/TRAINING PROGRAM

## 2018-01-03 PROCEDURE — 99233 SBSQ HOSP IP/OBS HIGH 50: CPT | Performed by: PHYSICIAN ASSISTANT

## 2018-01-03 PROCEDURE — 84100 ASSAY OF PHOSPHORUS: CPT | Performed by: STUDENT IN AN ORGANIZED HEALTH CARE EDUCATION/TRAINING PROGRAM

## 2018-01-03 PROCEDURE — 25000132 ZZH RX MED GY IP 250 OP 250 PS 637

## 2018-01-03 PROCEDURE — 36592 COLLECT BLOOD FROM PICC: CPT | Performed by: STUDENT IN AN ORGANIZED HEALTH CARE EDUCATION/TRAINING PROGRAM

## 2018-01-03 PROCEDURE — 80048 BASIC METABOLIC PNL TOTAL CA: CPT | Performed by: STUDENT IN AN ORGANIZED HEALTH CARE EDUCATION/TRAINING PROGRAM

## 2018-01-03 RX ADMIN — CLONIDINE HYDROCHLORIDE 0.1 MG: 0.1 TABLET ORAL at 20:23

## 2018-01-03 RX ADMIN — Medication 125 MG: at 16:40

## 2018-01-03 RX ADMIN — LORAZEPAM 1 MG: 1 TABLET ORAL at 14:27

## 2018-01-03 RX ADMIN — OLANZAPINE 5 MG: 5 TABLET, ORALLY DISINTEGRATING ORAL at 20:23

## 2018-01-03 RX ADMIN — PANTOPRAZOLE SODIUM 40 MG: 40 TABLET, DELAYED RELEASE ORAL at 09:27

## 2018-01-03 RX ADMIN — SODIUM CHLORIDE, PRESERVATIVE FREE 3 ML: 5 INJECTION INTRAVENOUS at 11:13

## 2018-01-03 RX ADMIN — CLONIDINE HYDROCHLORIDE 0.1 MG: 0.1 TABLET ORAL at 14:27

## 2018-01-03 RX ADMIN — LORAZEPAM 1 MG: 1 TABLET ORAL at 20:22

## 2018-01-03 RX ADMIN — CARVEDILOL 6.25 MG: 6.25 TABLET, FILM COATED ORAL at 20:24

## 2018-01-03 RX ADMIN — CARVEDILOL 6.25 MG: 6.25 TABLET, FILM COATED ORAL at 09:28

## 2018-01-03 RX ADMIN — LORAZEPAM 1 MG: 1 TABLET ORAL at 09:31

## 2018-01-03 RX ADMIN — Medication 125 MG: at 09:27

## 2018-01-03 RX ADMIN — OLANZAPINE 5 MG: 5 TABLET, ORALLY DISINTEGRATING ORAL at 09:27

## 2018-01-03 RX ADMIN — LORAZEPAM 1 MG: 1 TABLET ORAL at 02:06

## 2018-01-03 RX ADMIN — Medication 2 G: at 16:49

## 2018-01-03 RX ADMIN — MIRTAZAPINE 15 MG: 15 TABLET, FILM COATED ORAL at 20:22

## 2018-01-03 RX ADMIN — Medication 125 MG: at 20:22

## 2018-01-03 RX ADMIN — FOLIC ACID 1 MG: 1 TABLET ORAL at 09:28

## 2018-01-03 RX ADMIN — IBUPROFEN 600 MG: 200 TABLET, FILM COATED ORAL at 05:41

## 2018-01-03 RX ADMIN — Medication 125 MG: at 14:27

## 2018-01-03 RX ADMIN — LEVOTHYROXINE SODIUM 50 MCG: 25 TABLET ORAL at 09:27

## 2018-01-03 RX ADMIN — CLONIDINE HYDROCHLORIDE 0.1 MG: 0.1 TABLET ORAL at 09:27

## 2018-01-03 RX ADMIN — ENOXAPARIN SODIUM 40 MG: 40 INJECTION SUBCUTANEOUS at 14:27

## 2018-01-03 RX ADMIN — Medication 100 MG: at 09:28

## 2018-01-03 NOTE — PLAN OF CARE
Problem: Patient Care Overview  Goal: Plan of Care/Patient Progress Review  Patient up with stand by assist to use commode or to sit in chair. Patient asked for nurse to bring her outside of her room in the wheelchair to get out of her room. Patient wearing briefs and has been using call light to use the commode at times has been incont of urine and will ask for nurse to change brisf. Patient encouraged to change own brief. Patient tolerating diet and drinking liquids well. Diarrhea is slowing down. P:cont to monitor

## 2018-01-03 NOTE — PROGRESS NOTES
[facility], [place]    Sepsis Evaluation Progress Note    Date of Service: 01/02/2018    I was called to see Ana Laura Wharton due to abnormal vital signs triggering the Sepsis SIRS screening alert. She is known to have an infection.     Physical Exam    Vital Signs:  Temp: 97.9  F (36.6  C) Temp src: Oral BP: 158/90 Pulse: 100 Heart Rate: 89 Resp: 18 SpO2: 98 % O2 Device: None (Room air)      Lab:  Lactic Acid   Date Value Ref Range Status   01/02/2018 2.3 (H) 0.7 - 2.0 mmol/L Final       The patient is at baseline mental status.    The rest of their physical exam is significant for a cough.    Assessment and Plan    The cause of her elevated lactic acid is unclear.  She is being treated for C diff and was recently treated for a UTI.    Disposition: The patient will remain on the current unit. We will continue to monitor this patient closely.    - BC x2  - CXR now  - Procalcitonin now  - Will hold off on additional antibiotics pending studies    AKILAH Panchal CNP

## 2018-01-03 NOTE — PROGRESS NOTES
GI Progress Note  01/03/18    Subjective:  No acute events overnight. Abdominal cramping and bowel movements continue to improve. 1-2 bowel movements yesterday. Tolerating diet. No nausea, vomiting. No chest pain or shortness of breath. Overall feels she is improving.    Objective:  /78 (BP Location: Right arm)  Pulse 109  Temp 98.7  F (37.1  C) (Oral)  Resp 18  Wt 47.3 kg (104 lb 4.8 oz)  SpO2 97%  BMI 18.48 kg/m2  General: Alert, NAD  HEENT: NC/AT  CV: Tachycardic, regular rhythm  Resp: CTAB  Abd: Diffuse mild TTP, + BS, no guarding or rebound, non-distended  Ext: WWP  Neuro: Alert and oriented    Impression/Recommendation:  Ana Laura Wharton is a 47 year old female with past medical history of hypertension, anxiety, refractory bipolar disorder with multiple suicide attempts, hypothyroidism and recurrent CDI who was admitted after intentional overdose with amlodipine, NyQuil, acetaminophen and vodka. Her hospital course has been complicated by prolonged intubation with pneumonia, treatment with NAC for acetaminophen overdose/elevated liver tests and recurrent CDI after antibiotic administration (cefepime, vancomycin, ceftriaxone and metronidazole). She has had recurrent C diff typically provoked with antibiotics. Bowel movements decreasing and abdominal pain improving with vancomycin.     C Diff History:  - 8/2015: FMT  - 4/2016: Had UTI, given abx, developed C diff and treated with oral vancomycin for 4 months  - 5/2016: C diff negative  - 6/2016: C diff positive  - 8/8/2016: FMT.  Developed UTI and treated with antibiotics within a week of receiving FMT  - 8/13/2016: C diff positive  - 8/21/2016: C diff positive  - 9/2016: Given 10 day course of fidoxamicin with improvement in symptoms  - 10/11/2016: C diff negative  - 1/2017: Had cystits, given abx with vancomycin ppx  - 3/9/2017: C diff positive (was admitted for suicide attempt and had been off medications for weeks to months).  Treatment started  with vancomycin taper  - 3/23/2017: With persistent diarrhea, switched vanc taper to fidoxamicin  - 12/13/2017: Current admission, per report had formed stool on admission  - 12/14/2017: Started cefepime and vancomycin IV  - 12/17/2017: Switched to ceftriaxone and flagyl for aspiration PNA  - 12/22/2017: C diff positive, started on oral vancomycin, in addition to flagyl, cefepime, and vanc IV  - 12/24/2017: Cefepime and vanc IV stopped, vanc PO and flagyl continued  - 12/26/2017: Flagyl stopped  - 12/30/2017: TMP/SMX x 2 days; stopped     RECOMMENDATIONS:   - Vancomycin taper as follows: 125 mg QID x 14 days; 125 mg TID x 7 days; 125 mg BID x 7 days; 125 mg daily x 7 days, 125 mg every other day x 7 days. If she requires repeat antibiotics she should notify GI clinic immediately for discussion of vancomycin dosing.    GI will sign-off. Follow-up with Dr. Dennis non-urgently as outpatient (we will arrange). Please feel free to contact our service with any further questions or concerns regarding the care of this patient.     Plan of care discussed with Dr. Carlin.     Janelle Swain MD  Gastroenterology Fellow

## 2018-01-03 NOTE — PROGRESS NOTES
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 ICU course c/b lactic acidosis, acute hypoxic/hypercarbic respiratory failure 2/2 aspiration requiring intubation c/b ARDS, ARF, severe hypotension/vasogenic shock requiring pressors, paroxysmal A.fib, intermittent SVT, and sepsis 2/2 sinusitis. Patient extubated 12/24/17, transferred to medicine 12/25 for ongoing care.       Intentional overdose 12/13 with bipolar disorder, depression, anxiety, etoh abuse, possible benzodiazapine w/d: Overdose with amlodipine, Tylenol, Nyquil and Etoh. PTA drinking 16 oz vodka daily for 1.5 days. Treated with Ca gtt, high dose insulin, and NAC per poison control. Extubated 12/24. Sitter discontinued 12/28 per psych recs. Denies active SI, but given significant psych history, will likely benefit from inpatient psychiatric care. Unfortunately, patient has c.diff and not independent of own cares d/t weakness and deconditioning at this time, so not appropriate for inpatient psych on [facility] currently. Will need stay at rehab before potential transfer to inpatient psych. Psych re-consulted 1/2 recommend med psych unit vs rehab and then intensive day treatment program following.   - Continue scheduled remeron 15mg qhs and olanzapine 5mg BID  - Decreased prn ativan to 1-2mg q6h on 1/2, plan to then decrease to q8h prn on 1/5  - Suicide precautions  - Daily thiamine, folic acid, MVI      Diarrhea, recurrent C. Diff. H/o chronic infection s/p failed fecal  transplant (8/2016). C.diff +12/22. AXR 12/28 no acute findings. Currently on po vancomycin with slow improvement in stooling, unfortunately had to start abx for UTI as below. Has had decreasing stools while on vancomycin, 2 loose stools daily over past few days. Pt still requesting fidoxamicin, but is responding to vancomycin so GI agrees with holding off on this.   - GI following, plan to cont vanco po 125mg QID - will need long taper  - Obtain KUB XR if acute worsening to eval for toxic megacolon       Acute pancreatitis, transaminitis. Lipase 1775 12/26. Previously normal. US no stones. MRCP normal 12/27. ALP improving 586 (648), Tbili stable ~1.5. ALT, AST stable. Previously had diffuse abdominal tenderness, this resolved on 12/29. GI following, transaminitis thought to be 2/2 cholestasis of sepsis and critical illness. Tolerating diet as of 12/29.  - GI following  - Regular diet as tolerated, give IVF if acutely decompensates  - CMP QOD      ARDS 2/2 aspiration, acute hypoxic/hypercarbic respiratory failure. Initially d/t overdose, V/Q mismatch in setting of ARDS. S/p intubation (initially prone) flolan, solumedrol, diuretics, and Duobneb. Extubated 12/24. Completed steroids 12/28. VSS. Has no required supplemental oxygen since 12/29. Repeat CXR 1/2/18 with stable diffuse pulmonary opacities R>L. Ongoing dry cough, which will likely persist for weeks. No new fevers. O2 sats stable on RA.   - Continue aggressive pulm toilet with acapella and IS  - Tessalon pearls for cough  - PRN duonebs for wheezing      Severe malnutrition. Prealbumin 25 12/28. Very limited PO intake, patient reports due to high volume BM. Would like to avoid NJ at all lengths. PO intake improved after long discussion about TFs on 12/29. Nutrition following and calorie counts improved. Cont regular diet. Appreciate nutrition following. No indication for TF at this time.      HTN. PTA on Norvasc. Currently on coreg 6.25mg BID and clonidine  0.1mg TID. BPs stable on current regimen. Cont to monitor and adjust as needed.       Pressure injury. Not present on admission. Bilateral cheek and left forearm. WOCN following, last saw today 1/2 with recs to wash BID with saline and gauze and gently rub vaseline on cheek wounds, and was left forearmwith recs to cleanse with saline solution. Continue off loading measures.       Deconditioning. In setting of prolonged hospitalization and recent critical illness. Working with PT/OT. PT/OT currently recommending TCU. PMR consulted 1/2/18, awaiting recs. Dispo as outlined above.       Resolved Hospital Problems and Stable, Chronic Medical Conditions:  Altered mental status. Resolved. Was having waxing and waning mental clarity, which was likely multifactorial including: rapid cycling bipolar with psychotic features, ?drug induced psychosis, ?ICU delirium, ?ongoing infection with c.diff. Has been A&O x 3 over past 72 hours without hallucinations, agitation, or inappropriate comments. Monitor closely.       Likely acute opiate withdrawal. Signs and symptoms of opiate withdrawal noted 12/27, likely 2/2 high fentanyl dosing during intubation (chills, myalgia, diarrhea, diaphoresis, abdominal pain). Although several symptoms can be connected to other etiology (ex. Diarrhea and c diff, abdominal pain and pancreatitis). Started on scheduled opiates 12/27 with improvement, tapered off completely 12/31. No further s/sx of withdrawal. Cont to monitor for s/sx of withdrawal.     Dysuria. Patient reports dysuria, increased urinary frequency overnight 12/30. UA with WBC, +LE, neg nitrate. UCx prelim shows candida, also spoke with lab and a gram positive cocci species is growing - they then retracted this and resulted the culture as mixed urogenital mina. S/p macrobid 12/30- 1/2. Stopped macrobid once cx resulted as mixed urogenital mina. Dysuria was likely 2/2 vaginal candidiasis.     Vaginal candidiasis. Wet prep + candida. Neg  for trich or BV. S/p diflucan 150mg x 1 on 1/1. Monitor for recurrence of symptoms, may need additional diflucan dose.       Hypernatremia. Elevated from admission to 12/25, normalized 12/26. Thought 2/2 hypovolemia.      ARF. 2/2 CCB overdose. Cr peak 1.62. C/b volume overload and pulmonary edema, treated with IV lasix. Cr since normalized.       SVT. Presented with HR 190s in setting of critical illness following OD, resolved with IVF.       Paroxysmal a fib. Brief episode of a fib 12/20 with conversion to NSR with metoprolol x2. No further episodes. On coreg as above.      Normocytic anemia. Hgb 10.4 on admit. Drop to 5.8 12/19, s/p 1U PRBC. Smear with marked normochromic, normocytic anemia, very rare RBC fragments and spherocytes. Haptoglobin 63, . Concern for possible alveolar hemorrhage as above. Possibly 2/2 chronic Etoh use. Hgb now stable. Cont folic acid.       Coagulopathy. In the setting of etoh use, poor nutrition. S/p vitamin K. No s/s of acute bleeding. Normalized 12/27.      Hypothyroidism. TSH 5.06, T4 normal in setting of critical illness thus no dose adjustments made. Continue PTA synthroid. Needs repeat TFTS 6 weeks from 12/13.       Incidental MRCP findings. Abnormal soft tissue posterior to the fundus of the stome, similar to 1/2017 CT. Needs follow up as outpatient for biopsy on discharge.       GI ppx. In setting of recent OD. Cont PPI qd.     Lactic acidosis. Pt had lactic acid elev to 2.3 on 1/2 after triggering protocol for tachycardia. BC x 2 obtained, CXR obtained -stable with previous. Procalc low. Has known c.diff and resolving ARDS as above. Also patient with poor fluid intake. Repeat lactic acid today showed 0.9 after no changes were made to plan. Only check if triggers protocol.       Diet: regular diet  Fluids: none  DVT Prophylaxis: lovenox   Code Status: FULL    Disposition Plan   Expected discharge: pending; recommended to transitional care unit once placement  established.   Information in the above section will display in the discharge planner report.      The patient's care was discussed with the Attending Physician, Dr. Lopez, Bedside Nurse, Care Coordinator/ and Patient.    Negin Hyde [facility]  Internal Medicine Staff Hospitalist Service  [facility]  Pager: [phone]  Please see sticky note for cross cover information    Interval History   Ana Laura is doing okay today. Reports that her bowel movements have improved over past 24 hours, less liquidy. Still having about 2 stools daily. No chest pain, sob, or dyspnea. No fevers or chills. Reports cough improving. No dysuria or urinary frequency. Denies SI.     Data reviewed today: I reviewed all medications, new labs and imaging results over the last 24 hours. I personally reviewed labs, imaging, and vitals.     Physical Exam   /89 (BP Location: Right arm)  Pulse 109  Temp 97.9  F (36.6  C) (Oral)  Resp 18  Wt 49.6 kg (109 lb 6.4 oz)  SpO2 98%  BMI 19.38 kg/m2     GENERAL: Alert and oriented x 3. NAD. Appears deconditioned.   HEENT: Anicteric sclera. MMM.   CV: RRR.  RESPIRATORY: Effort normal on RA. Lungs CTAB.   GI: Abdomen soft and non distended with normoactive bowel sounds present in all quadrants. No tenderness, rebound, guarding.   NEUROLOGICAL: No focal deficits. Moves all extremities.    EXTREMITIES: No peripheral edema. Intact bilateral pedal pulses.   SKIN: No jaundice. Ecchymoses on both cheeks, no open areas.

## 2018-01-03 NOTE — PLAN OF CARE
Problem: Patient Care Overview  Goal: Plan of Care/Patient Progress Review  Outcome: No Change  0295-2509: A&O. VSS on RA. Assist x 1 for bedpan, calls appropriately. Void x 4, orange urine. Intermittent incontinence. Triple lumen PICC, SL. C/o anxiety, managed with ativan x 1. C/o headache, ibuprofen given x 1.

## 2018-01-03 NOTE — PLAN OF CARE
Problem: Patient Care Overview  Goal: Plan of Care/Patient Progress Review  Pt A&Ox4. VSS on RA. Denies SI,but c/o of anxiousness, gave PRN ativan 1X. Up SBA/ A1 to bedside commode, 1BM this shift. Tolerating Reg diet, fair appetite. WOC orders complete, see orders. Mg 1.8, recheck a.m. Labs. L TL PICC SL. Triggered sepsis, Lactic 2.3, MD aware. CXR ordered, see results. MD order stating Pt not septic. Waiting for placement at rehab center. Will continue w/ POC

## 2018-01-04 ENCOUNTER — APPOINTMENT (OUTPATIENT)
Dept: PHYSICAL THERAPY | Facility: CLINIC | Age: 48
DRG: 917 | End: 2018-01-04
Attending: SURGERY
Payer: COMMERCIAL

## 2018-01-04 ENCOUNTER — APPOINTMENT (OUTPATIENT)
Dept: OCCUPATIONAL THERAPY | Facility: CLINIC | Age: 48
DRG: 917 | End: 2018-01-04
Attending: SURGERY
Payer: COMMERCIAL

## 2018-01-04 LAB
ALBUMIN SERPL-MCNC: 2.6 G/DL (ref 3.4–5)
ALP SERPL-CCNC: 429 U/L (ref 40–150)
ALT SERPL W P-5'-P-CCNC: 36 U/L (ref 0–50)
ANION GAP SERPL CALCULATED.3IONS-SCNC: 11 MMOL/L (ref 3–14)
AST SERPL W P-5'-P-CCNC: 35 U/L (ref 0–45)
BILIRUB SERPL-MCNC: 1.3 MG/DL (ref 0.2–1.3)
BUN SERPL-MCNC: 14 MG/DL (ref 7–30)
CALCIUM SERPL-MCNC: 9.7 MG/DL (ref 8.5–10.1)
CHLORIDE SERPL-SCNC: 100 MMOL/L (ref 94–109)
CO2 SERPL-SCNC: 24 MMOL/L (ref 20–32)
CREAT SERPL-MCNC: 0.59 MG/DL (ref 0.52–1.04)
ERYTHROCYTE [DISTWIDTH] IN BLOOD BY AUTOMATED COUNT: 18.1 % (ref 10–15)
GFR SERPL CREATININE-BSD FRML MDRD: >90 ML/MIN/1.7M2
GLUCOSE SERPL-MCNC: 162 MG/DL (ref 70–99)
HCT VFR BLD AUTO: 30.2 % (ref 35–47)
HGB BLD-MCNC: 9.3 G/DL (ref 11.7–15.7)
MAGNESIUM SERPL-MCNC: 1.9 MG/DL (ref 1.6–2.3)
MCH RBC QN AUTO: 32.1 PG (ref 26.5–33)
MCHC RBC AUTO-ENTMCNC: 30.8 G/DL (ref 31.5–36.5)
MCV RBC AUTO: 104 FL (ref 78–100)
PLATELET # BLD AUTO: 345 10E9/L (ref 150–450)
POTASSIUM SERPL-SCNC: 3.5 MMOL/L (ref 3.4–5.3)
PROT SERPL-MCNC: 6.4 G/DL (ref 6.8–8.8)
RBC # BLD AUTO: 2.9 10E12/L (ref 3.8–5.2)
SODIUM SERPL-SCNC: 135 MMOL/L (ref 133–144)
WBC # BLD AUTO: 10.9 10E9/L (ref 4–11)

## 2018-01-04 PROCEDURE — 25000132 ZZH RX MED GY IP 250 OP 250 PS 637: Performed by: STUDENT IN AN ORGANIZED HEALTH CARE EDUCATION/TRAINING PROGRAM

## 2018-01-04 PROCEDURE — 97116 GAIT TRAINING THERAPY: CPT | Mod: GP

## 2018-01-04 PROCEDURE — 99232 SBSQ HOSP IP/OBS MODERATE 35: CPT | Performed by: PHYSICIAN ASSISTANT

## 2018-01-04 PROCEDURE — 97530 THERAPEUTIC ACTIVITIES: CPT | Mod: GP

## 2018-01-04 PROCEDURE — 97110 THERAPEUTIC EXERCISES: CPT | Mod: GP

## 2018-01-04 PROCEDURE — 25000132 ZZH RX MED GY IP 250 OP 250 PS 637: Performed by: INTERNAL MEDICINE

## 2018-01-04 PROCEDURE — 25000128 H RX IP 250 OP 636: Performed by: INTERNAL MEDICINE

## 2018-01-04 PROCEDURE — 36592 COLLECT BLOOD FROM PICC: CPT | Performed by: PHYSICIAN ASSISTANT

## 2018-01-04 PROCEDURE — 25000132 ZZH RX MED GY IP 250 OP 250 PS 637

## 2018-01-04 PROCEDURE — 25000132 ZZH RX MED GY IP 250 OP 250 PS 637: Performed by: PHYSICIAN ASSISTANT

## 2018-01-04 PROCEDURE — 12000001 ZZH R&B MED SURG/OB UMMC

## 2018-01-04 PROCEDURE — 85027 COMPLETE CBC AUTOMATED: CPT | Performed by: PHYSICIAN ASSISTANT

## 2018-01-04 PROCEDURE — 83735 ASSAY OF MAGNESIUM: CPT | Performed by: PHYSICIAN ASSISTANT

## 2018-01-04 PROCEDURE — 40000193 ZZH STATISTIC PT WARD VISIT

## 2018-01-04 PROCEDURE — 40000133 ZZH STATISTIC OT WARD VISIT

## 2018-01-04 PROCEDURE — 97535 SELF CARE MNGMENT TRAINING: CPT | Mod: GO

## 2018-01-04 PROCEDURE — 80053 COMPREHEN METABOLIC PANEL: CPT | Performed by: PHYSICIAN ASSISTANT

## 2018-01-04 RX ORDER — MIRTAZAPINE 15 MG/1
15 TABLET, FILM COATED ORAL AT BEDTIME
Qty: 30 TABLET | DISCHARGE
Start: 2018-01-04 | End: 2018-03-26

## 2018-01-04 RX ORDER — LANOLIN ALCOHOL/MO/W.PET/CERES
100 CREAM (GRAM) TOPICAL DAILY
DISCHARGE
Start: 2018-01-05 | End: 2018-02-13

## 2018-01-04 RX ORDER — BENZONATATE 100 MG/1
100 CAPSULE ORAL 3 TIMES DAILY PRN
Qty: 42 CAPSULE | DISCHARGE
Start: 2018-01-04 | End: 2018-02-13

## 2018-01-04 RX ORDER — LORAZEPAM 1 MG/1
TABLET ORAL
Qty: 60 TABLET | Status: SHIPPED | DISCHARGE
Start: 2018-01-04 | End: 2018-01-05

## 2018-01-04 RX ORDER — CLONIDINE HYDROCHLORIDE 0.1 MG/1
0.1 TABLET ORAL 3 TIMES DAILY
Qty: 60 TABLET | DISCHARGE
Start: 2018-01-04 | End: 2018-02-13

## 2018-01-04 RX ORDER — CARVEDILOL 6.25 MG/1
6.25 TABLET ORAL 2 TIMES DAILY
Qty: 60 TABLET | Status: ON HOLD | DISCHARGE
Start: 2018-01-04 | End: 2018-07-31

## 2018-01-04 RX ORDER — FOLIC ACID 1 MG/1
1 TABLET ORAL DAILY
Qty: 30 TABLET | Status: ON HOLD | DISCHARGE
Start: 2018-01-05 | End: 2018-07-31

## 2018-01-04 RX ADMIN — Medication 125 MG: at 16:03

## 2018-01-04 RX ADMIN — CLONIDINE HYDROCHLORIDE 0.1 MG: 0.1 TABLET ORAL at 14:27

## 2018-01-04 RX ADMIN — OLANZAPINE 5 MG: 5 TABLET, ORALLY DISINTEGRATING ORAL at 09:16

## 2018-01-04 RX ADMIN — CLONIDINE HYDROCHLORIDE 0.1 MG: 0.1 TABLET ORAL at 19:52

## 2018-01-04 RX ADMIN — IBUPROFEN 600 MG: 200 TABLET, FILM COATED ORAL at 04:08

## 2018-01-04 RX ADMIN — Medication 125 MG: at 19:51

## 2018-01-04 RX ADMIN — Medication 125 MG: at 09:14

## 2018-01-04 RX ADMIN — Medication 100 MG: at 09:15

## 2018-01-04 RX ADMIN — CLONIDINE HYDROCHLORIDE 0.1 MG: 0.1 TABLET ORAL at 09:15

## 2018-01-04 RX ADMIN — ENOXAPARIN SODIUM 40 MG: 40 INJECTION SUBCUTANEOUS at 11:47

## 2018-01-04 RX ADMIN — CARVEDILOL 6.25 MG: 6.25 TABLET, FILM COATED ORAL at 19:52

## 2018-01-04 RX ADMIN — SODIUM CHLORIDE, PRESERVATIVE FREE 5 ML: 5 INJECTION INTRAVENOUS at 06:13

## 2018-01-04 RX ADMIN — PANTOPRAZOLE SODIUM 40 MG: 40 TABLET, DELAYED RELEASE ORAL at 09:15

## 2018-01-04 RX ADMIN — FLUCONAZOLE 150 MG: 150 TABLET ORAL at 09:16

## 2018-01-04 RX ADMIN — MIRTAZAPINE 15 MG: 15 TABLET, FILM COATED ORAL at 19:51

## 2018-01-04 RX ADMIN — OLANZAPINE 5 MG: 5 TABLET, ORALLY DISINTEGRATING ORAL at 19:52

## 2018-01-04 RX ADMIN — FOLIC ACID 1 MG: 1 TABLET ORAL at 09:15

## 2018-01-04 RX ADMIN — LORAZEPAM 1 MG: 1 TABLET ORAL at 09:22

## 2018-01-04 RX ADMIN — IBUPROFEN 600 MG: 200 TABLET, FILM COATED ORAL at 19:58

## 2018-01-04 RX ADMIN — LORAZEPAM 1 MG: 1 TABLET ORAL at 03:08

## 2018-01-04 RX ADMIN — LEVOTHYROXINE SODIUM 50 MCG: 25 TABLET ORAL at 09:15

## 2018-01-04 RX ADMIN — LORAZEPAM 1 MG: 1 TABLET ORAL at 19:52

## 2018-01-04 RX ADMIN — Medication 125 MG: at 11:47

## 2018-01-04 RX ADMIN — CARVEDILOL 6.25 MG: 6.25 TABLET, FILM COATED ORAL at 09:16

## 2018-01-04 NOTE — PROGRESS NOTES
Social Work Services Progress Note    Hospital Day: 22  Date of Initial Social Work Evaluation:  12/14/2017  Collaborated with:  [facility] Liaison ([place] [phone]), RNCC    Data:  SW received notification from [facility] that pt has been globally denied from all of their facilities as they do not feel they can meet pt's mental health needs. Pt has also been declined from [facility] and  TCU/ARU. Discussed w/ RNCC and she will attempt LTACH placement.     Intervention:  D/C Planning    Assessment:  RNCC to meet w/ pt to discuss LTACH referral.    Plan:    Anticipated Disposition:  Facility:  referred to LTACH-please see RNCC note    Barriers to d/c plan:  Mental Health    Follow Up:  SW to continue to follow, but with global denial from [facility] highly unlikely that any other Count includes the [facility] TCUs will accept.

## 2018-01-04 NOTE — PLAN OF CARE
Problem: Patient Care Overview  Goal: Plan of Care/Patient Progress Review  Discharge Planner OT   Patient plan for discharge: none stated  Current status: Pt completed bed mobility supine to sit at EOB with rest break before completing sit to stand with use of walker and CGA. Ambulated in room to bathroom with walker and CGA. Performed g/h tasks with set up, standing at sink with chair behind, frequent seated rest breaks. Reviewed EC/WS and fatigue management strategies, pt able to recall.  Barriers to return to prior living situation: fatigue, weakness, ADL deficits, limited mobiltiy  Recommendations for discharge: Per plan established by the OT, the recommendation for dc location is TCU  Rationale for recommendations: pt not safe to return to home at current level without continued OT to progress ADLs and mobiltiy

## 2018-01-04 NOTE — PLAN OF CARE
Problem: Patient Care Overview  Goal: Plan of Care/Patient Progress Review  Outcome: Improving  [id number]:    Reason for admission: overdose  Vitals: VSS on RA  Activity: assist x1 plus walker and bed alarm  Pain: no c/o pain, gave PRN Ativan 1 mg tab x1 for anxiety  Neuro: WNL ex anxious  Cardiac: WNL  Respiratory: WNL ex diminished  GI/: flat abd, intermittent incontinence - uses bedpan most of time, diarrhea (c diff), last BM today  Diet: Regular, fair appetite  Lines: L triple PICC SL  Wounds: bilateral cheeks, L arm AC  Labs/imaging: Mg 1.7    New changes this shift: replaced Mg - recheck in AM scheduled, using bedpan many times during shift, mood appropriate all shift    Plan: dc rehab pending bed availability    Continue to monitor and follow POC

## 2018-01-04 NOTE — PROGRESS NOTES
Social Work Services Progress Note    Hospital Day: 21  Date of Initial Social Work Evaluation:  12/14/2017  Collaborated with:  [facility] ARU/TCU Admissions, pt    Data:  SW following for placement. [facility] ARU/TCU continues to decline pt as pt's psychiatric needs unable to be met on the unit. Pt has been declined by [facility]. Did not hear back from [facility], but anticipate facility will not be able provide the care pt requires. After, discussion w/ pt, she is willing to broaden area for TCUs. Referral initiated to [facility], through our hospital liaison.     Intervention:  D/c planning     Assessment:  Pt actively participated in d/c planning discussion. Writer had a jazímn conversation regarding barriers to placement and that we would need to look outside of her preferred area. Pt was very receptive to this information and in agreement [place]     Plan:    Anticipated Disposition:  TCU referrals out to [facility]    Barriers to d/c plan:  Current Mental Health Needs    Follow Up:  SW awaiting completion of assessment by [facility]

## 2018-01-04 NOTE — PLAN OF CARE
Problem: Patient Care Overview  Goal: Plan of Care/Patient Progress Review  Outcome: No Change  Pt A/Ox4, VSS and denies pain. C/O anxiety, treated with PRN ativan x1, relief. Up with A1 to toilet. Pt had BM today, diarrhea doing much better per pt. Ate 50% of breakfast and having a late lunch currently. L PICC triple lumen SL. Pt has WC every other day on L AC joint, done 1/3/18 last. Plan is for pt to DC to LTAC today at 18:30 if insurance approves pt. Per SW, highly likely pt will DC but not certain yet. Pt will DC on long vanco taper. Will continue to monitor pt and follow POC.

## 2018-01-04 NOTE — PLAN OF CARE
Problem: Patient Care Overview  Goal: Plan of Care/Patient Progress Review  Outcome: No Change  0403-8580    /79 (BP Location: Right arm)  Pulse 109  Temp 98.3  F (36.8  C) (Oral)  Resp 16  Wt 47.3 kg (104 lb 4.8 oz)  SpO2 98%  BMI 18.48 kg/m2    Vitals: VSS on RA.   Activity: Up Ax1. Bed alarm on for safety.   Pain: C/o headache this shift. PRN ibuprofen given x1.   Neuro: A&O x4. Calling appropriately.   Cardiac: WDL.  Respiratory: WDL.   GI/: No BM this shift. Intermittently incontinent of urine. Voiding WDL otherwise on bedpan or bedside commode.   Diet: Regular diet.   Skin: Bilateral cheek wounds s/p prone position in ICU. L+R antecubital wounds, covered with mepilex.   LDAs: L triple lumen PICC SL.  New this shift: PRN ativan given x1 for mild anxiety tonight.    Plan: Mag recheck in AM (Replaced on evening shift 1/3/18). D/c to an acute rehab facility or TCU when accepted. Will eventually go to inpatient psych, but currently is c-diff positive so that is a barrier to admission. Continue to monitor and follow POC.

## 2018-01-04 NOTE — PROGRESS NOTES
CLINICAL NUTRITION SERVICES - REASSESSMENT NOTE     Nutrition Prescription    Malnutrition Status:    Severe malnutrition in the context of acute illness     Recommendations already ordered by Registered Dietitian (RD):  Continue PRN snacks/suppl order     Future/Additional Recommendations:  If PO intake and or weight decline, recommend ordering snacks/suppl per pt preference and order calorie counts.       EVALUATION OF THE PROGRESS TOWARD GOALS   Diet: Regular, PRN snacks/supplements   Intake: Pt declined full nutrition visit d/t receiving important phone call. Denies any nutrition concerns at this time, reports fair appetite. Per chart review, appears majority of recent intakes ranging from % meals.    Calorie counts:  12/29:  1011 kcal, 32 g pro (2 meals)   12/28:    278 kcal, 5 g pro (2 meals)      NEW FINDINGS   Weight: 47.3 kg yesterday (1/3). Suggests pt down 15 kg (24%) since admit (x3 weeks). Adjusted dosing wt.     ASSESSED NUTRITION NEEDS (Dosing wt of 47 kg):   Estimated Energy Needs: 1410 - 1645 kcal/day (30-35 kcal/kg)  Justification: Underweight   Estimated Protein Needs: 70 - 94 g/day (1.5-2 g/kg)  Justification: Repletion, Increased needs, wound healing     Skin: Per WOCN (1/2)   - BL cheek wounds: Stage 2 PI --> Now improving/resolved.   - L Arm wounds: Unstageable PI on proximal forearm; Stage 2 PI on distal forearm.      MALNUTRITION    % Intake: < 75% for > 7 days (non-severe)  % Weight Loss: 24% in 3 weeks (severe)  Subcutaneous Fat Loss: Thoracic/intercostal:  Mild  Muscle Loss: Facial & jaw region, Thoracic region (clavicle, acromium bone, deltoid, trapezius, pectoral), Upper arm (bicep, tricep) and Lower arm  (forearm):  Moderate  Fluid Accumulation/Edema: None noted  Malnutrition Diagnosis: Severe malnutrition in the context of acute illness     Previous Goals   Patient to consume >50% of nutritionally adequate meal trays TID, or the equivalent with supplements/snacks  Evaluation:  Met    Previous Nutrition Diagnosis  Inadequate protein-energy intake related to decreased appetite s/p recent extubation, still with AMS at times as evidenced by pt likely eating 50% or less of most meals.   Evaluation: Improving    CURRENT NUTRITION DIAGNOSIS  Inadequate oral intake related to reduced appetite as evidenced by pt eating % meals per RN documentation.       INTERVENTIONS  Implementation  NA     Goals  Patient to consume % of nutritionally adequate meal trays TID, or the equivalent with supplements/snacks.  Wt to maintain (or ideally gain greater than) 47 kg.     Monitoring/Evaluation  Progress toward goals will be monitored and evaluated per protocol.    Shanique Yost, ELLIOT, LD  5A/5B: [phone]     03-Jan-2018

## 2018-01-04 NOTE — PROGRESS NOTES
Care Coordinator Progress Note     Admission Date/Time:  12/13/2017  Attending MD:  eKrry Lopez MD     Data  Chart reviewed, discussed with interdisciplinary team.     Coordination of Care and Referrals: Provided patient/family with options for LTACH.        Assessment  RNCC met with pt, per SW we have been unable to secure a TCU because they feel that they are unable to keep patient safe due to her hx of suicide attempts.  Pt will need ongoing therapy in a facility or in pt at hospital until she is strong enough to be safely dc to home and follow up with out pt mental health services.  Per Negin MEI, pt could rehab and then dc to home with out pt mental health follow up.  RNCC met with pt and explained difficulty with obtaining a TCU or ARU and discussed [facility] as a possibility for ongoing rehab and mental health follow up.  Pt agreed with referral.  Pt has a complex mental health hx, and had been in intubated and in ICU at [facility] during this admission for greater than a week (13-25th of December).  RNCC contacted Karena, liaison for [facility], who is going to review chart and meet with pt.  MD team updated.       [address] [address]  Clinical Liaison phone number: [phone] Karena     1/4/2018 2:30 PM Addendum: Karena contacted this RNCC and notified of acceptance to [facility] pending final insurance review.  She stated that it has gone to the 2nd review for insurance, they are moving forward like pt is approved.  They would like pt transport set up for this evening.  RNCC update PA, met with pt and explained possibility of dc today, pt agrees, states that her  will be here after 4 today and she will update him.  Left [facility] information with pt.  Karena met with pt earlier in the day.  Bedside RN also aware of dc plan.    [facility]- ride set up for 7 pm via stretcher (no  ride available for this time).     [phone]     1/4/2018 4:09 PM Addendum: Rosemary states that they have not gotten a final approval yet, pt to hold till tomorrow when her insurance can complete the review.   Rescheduled for 1 pm tomorrow via wc ride.      Clarissa Mccormick, RNCC, BSN    [facility]  [address] [place] [id number]    [email or web address]  Pending sale to [email or web address]    Office: [phone] Pager: [phone]

## 2018-01-04 NOTE — PLAN OF CARE
Problem: Patient Care Overview  Goal: Plan of Care/Patient Progress Review  Discharge Planner PT   Patient plan for discharge: Rehab    Current status: Pt IND with bed mobility however slow due to UE weakness.  Pt CGA with transfers with use of walker.  Pt ambulated 20' and 15' x 2 with use of FWW and CGA, w/c follow due to impulsivity and weakness, shaking in R LE noted.  Pt also completed supine LE strengthening exercises.  Barriers to return to prior living situation: Need for increased assist with ambulation and tranfers due to LE weakness and balance deficits   Recommendations for discharge: TCU  Rationale for recommendations: To increase strength and balance to increase safety with mobility       Entered by: Landy Valles 01/04/2018 3:49 PM

## 2018-01-04 NOTE — DISCHARGE SUMMARY
[facility]    Internal Medicine Discharge Summary- Gold Service    Date of Admission:  12/13/2017  Date of Discharge:  1/5/2018  Discharging Provider: Negin Mathias PA-C, Dr Lopez  Discharge Team: Gold 3    Discharge Diagnoses   Intentional overdose 12/13 with bipolar disorder, depression, anxiety, alcohol abuse, possible benzodiazepine withdrawal  Diarrhea, recurrent c.diff  Acute pancreatitis, transaminitis  ARDS 2/2 aspiration, acute hypoxic/hypercarbic respiratory failure  Severe malnutrition  HTN  Pressure injury  Deconditioning    Follow-ups Needed After Discharge   - Have routine CMP and CBC checked every Monday while at [facility].  - Needs repeat TSH, T4 on 1/8/17 with dose adjustments to be made to synthroid if needed  - Needs to continue to follow with psychiatry as needed while at [facility]. Will need intensive day program treatment on discharge from [facility]  - Needs to follow up with GI (once discharge from [facility], Dr Dennis) for monitoring and treatment of C.Diff  - Needs to follow up with PCP once discharge from [facility]  - Needs biopsy fundus of stomach as outpatient once discharge from [facility]    Hospital Course   In brief, Ana Laura Wharton is a 47 year old female with history of HTN, bipolar disorder, anxiety, depression with multiple suicide attempts, 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 ICU course c/b lactic acidosis, acute hypoxic/hypercarbic respiratory failure 2/2 aspiration requiring intubation c/b ARDS, ARF, severe hypotension/vasogenic shock requiring pressors, paroxysmal A.fib, intermittent SVT, and sepsis 2/2 sinusitis. Patient extubated 12/24/17, transferred to medicine 12/25 for ongoing care. She did well once transferred to medicine floor. Her suicidal ideation resolved and she did well once bedside  attendant removed. Her c.diff improved and was well controlled on vancomycin. Her main ongoing issue remained deconditioning. She was discharged to [facility] on 1/4/18.    The following problems were addressed during her hospitalization:    Intentional overdose 12/13 with bipolar disorder, depression, anxiety, etoh abuse, possible benzodiazapine w/d: Overdose with amlodipine, Tylenol, Nyquil and Etoh. PTA drinking 16 oz vodka daily for 1.5 days. Treated with Ca gtt, high dose insulin, and NAC per poison control. Extubated 12/24. Sitter discontinued 12/28 per psych recs. Psych has been following at least weekly, last seen 1/2, recommend med psych unit (patient not a candidate) vs rehab. [facility] accepted patient on 1/4/18. Once completes rehab, will need intensive day treatment program. Cont remeron 15mg qhs and olazapine 5mg BID. Cont prn ativan 1-2mg q8h prn starting 1/5. Cont thiamine and folic acid. Psych to cont to follow at [facility] as needed, would benefit from weekly visits if needed.       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 C.diff +12/22. AXR 12/28 no acute findings. Currently on po vancomycin with slow improvement in stooling, unfortunately had to start abx for UTI as below. Has had decreasing stools while on vancomycin, only 1-2 loose stools daily. GI followed and recommended starting long vancomycin as follows: 125mg qid x 13days, 125mg TID x 7d, 125mg BID x 7d, 125mg QD x 7d, 125mg QOD x 7d. If patient needs recurrent antibiotics, need to discuss vanco recs with primary GI MD Dr Dennis.       Acute pancreatitis, transaminitis. Lipase 1775 12/26. Previously normal. US no stones. MRCP normal 12/27. ALP improving 429 (648), Tbili stable ~1.5. ALT, AST stable. Previously had diffuse abdominal tenderness, this resolved on 12/29. GI following, transaminitis thought to be 2/2 cholestasis of sepsis and critical illness. Tolerating diet as of 12/29. Cont regular diet.  [facility]       ARDS 2/2 aspiration, acute hypoxic/hypercarbic respiratory failure. Initially d/t overdose, V/Q mismatch in setting of ARDS. S/p intubation (initially prone) flolan, solumedrol, diuretics, and Duobneb. Extubated 12/24. Completed steroids 12/28. VSS. Has no required supplemental oxygen since 12/29. Repeat CXR 1/2/18 with stable diffuse pulmonary opacities R>L. Ongoing dry cough, which will likely persist for weeks. No new fevers. O2 sats stable on RA. Cough overall improving to near resolved. Continue aggressive pulm toilet with acapella and IS on discharge. Cont tessalon pearls for cough.     Severe malnutrition. Prealbumin 25 12/28. Very limited PO intake, patient reports due to high volume BM. Would like to avoid [place] at all lengths. PO intake improved after long discussion about TFs on 12/29. Nutrition following and calorie counts improved. Cont regular diet. Cont to monitor po intake and [facility].       HTN. PTA on Norvasc. Currently on coreg 6.25mg BID and clonidine 0.1mg TID. BPs stable on current regimen. Cont this regimen on discharge.       Pressure injury. Not present on admission. Bilateral cheek and left forearm. WOCN following, last saw 1/2 with recs to wash BID with saline and gauze and gently rub vaseline on cheek wounds, and was left forearmwith recs to cleanse with saline solution. Continue off loading measures. Continue these wound cares on discharge.       Deconditioning. In setting of prolonged hospitalization and recent critical illness. Working with PT/OT. PT/OT currently recommending TCU. Unfortunately given patients recent severity of illness, has been difficult to place. Discharged to [facility] 1/4.       Resolved Hospital Problems and Stable, Chronic Medical Conditions:  Altered mental status. Resolved. Was having waxing and waning mental clarity, which was likely multifactorial including: rapid cycling bipolar with  psychotic features, ?drug induced psychosis, ?ICU delirium, ?ongoing infection with c.diff. Has been A&O x 3 over past 72 hours without hallucinations, agitation, or inappropriate comments. Monitor closely.       Likely acute opiate withdrawal. Signs and symptoms of opiate withdrawal noted 12/27, likely 2/2 high fentanyl dosing during intubation (chills, myalgia, diarrhea, diaphoresis, abdominal pain). Although several symptoms can be connected to other etiology (ex. Diarrhea and c diff, abdominal pain and pancreatitis). Started on scheduled opiates 12/27 with improvement, tapered off completely 12/31. No further s/sx of withdrawal. Cont to monitor for s/sx of withdrawal. Did not require any narcotics prior to discharge.      Dysuria. Patient reports dysuria, increased urinary frequency overnight 12/30. UA with WBC, +LE, neg nitrate. UCx prelim shows candida, also spoke with lab and a gram positive cocci species is growing - they then retracted this and resulted the culture as mixed urogenital mina. S/p macrobid 12/30- 1/2. Stopped macrobid once cx resulted as mixed urogenital mina. Dysuria was likely 2/2 vaginal candidiasis.      Vaginal candidiasis. Wet prep + candida. Neg for trich or BV. S/p diflucan 150mg x 1 on 1/1 and again on 1/4. No recurrence of symptoms. Monitor for recurrence of symptoms on discharge.       Hypernatremia. Elevated from admission to 12/25, normalized 12/26. Thought 2/2 hypovolemia.      ARF. 2/2 CCB overdose. Cr peak 1.62. C/b volume overload and pulmonary edema, treated with IV lasix. Cr since normalized.       SVT. Presented with HR 190s in setting of critical illness following OD, resolved with IVF.       Paroxysmal a fib. Brief episode of a fib 12/20 with conversion to NSR with metoprolol x2. No further episodes. On coreg as above.      Normocytic anemia. Hgb 10.4 on admit. Drop to 5.8 12/19, s/p 1U PRBC. Smear with marked normochromic, normocytic anemia, very rare RBC fragments and  spherocytes. Haptoglobin 63, . Concern for possible alveolar hemorrhage as above. Possibly 2/2 chronic Etoh use. Hgb now stable. Cont folic acid.       Coagulopathy. In the setting of etoh use, poor nutrition. S/p vitamin K. No s/s of acute bleeding. Normalized 12/27.      Hypothyroidism. TSH 5.06, T4 normal in setting of critical illness thus no dose adjustments made. Continue PTA synthroid. Needs repeat TFTS on 1/8/17      Incidental MRCP findings. Abnormal soft tissue posterior to the fundus of the stomach, similar to 1/2017 CT. Needs follow up as outpatient for biopsy on discharge - PCP vs GI to arrange on discharge from [facility].       GI ppx. In setting of recent OD. Cont PPI qd.      Lactic acidosis. Pt had lactic acid elev to 2.3 on 1/2 after triggering protocol for tachycardia. BC x 2 obtained, CXR obtained -stable with previous. Procalc low. Has known c.diff and resolving ARDS as above. Also patient with poor fluid intake. Repeat lactic acid today showed 0.9 after no changes were made to plan. Only check if triggers protocol.     Consultations This Hospital Stay   SPEECH LANGUAGE PATH ADULT IP CONSULT  CHEMICAL DEPENDENCY IP CONSULT  PSYCHIATRY IP CONSULT  VASCULAR ACCESS CARE ADULT IP CONSULT  PHARMACY TO DOSE VANCO  CARDIOLOGY GENERAL ADULT IP CONSULT  VASCULAR ACCESS CARE ADULT IP CONSULT  VASCULAR ACCESS CARE ADULT IP CONSULT  PHARMACY TO MAXIMALLY CONCENTRATE IV MEDICATIONS  NEPHROLOGY GENERAL ADULT IP CONSULT  PHARMACY TO DOSE VANCO  VASCULAR ACCESS CARE ADULT IP CONSULT  VASCULAR ACCESS CARE ADULT IP CONSULT  VASCULAR ACCESS CARE ADULT IP CONSULT  PHARMACY TO DOSE VANCO  VASCULAR ACCESS ADULT IP CONSULT  NUTRITION SERVICES ADULT IP CONSULT  PHARMACY IP CONSULT  PHARMACY TO DOSE VANCO  WOUND OSTOMY CONTINENCE NURSE  IP CONSULT  PSYCHIATRY IP CONSULT  SWALLOW EVAL SPEECH PATH AT BEDSIDE IP CONSULT  PHYSICAL THERAPY ADULT IP CONSULT  OCCUPATIONAL THERAPY ADULT IP CONSULT  PSYCHIATRY IP  CONSULT  SOCIAL WORK IP CONSULT  GI PANCREATICOBILIARY ADULT IP CONSULT  PSYCHIATRY IP CONSULT  PHYSICAL MEDICINE & REHAB ASSESSMENT FOR REHAB PLACEMENT ADULT IP CONSULT  PHYSICAL THERAPY ADULT IP CONSULT  OCCUPATIONAL THERAPY ADULT IP CONSULT  PSYCHIATRY IP CONSULT     Code Status   Full Code    Time Spent on this Encounter   I, Negin Mathias, personally saw the patient today and spent greater than 30 minutes discharging this patient.       Negin Mathias  Internal Medicine Staff Hospitalist Service  [facility]  Pager: [phone]  ______________________________________________________________________    Physical Exam   Vital Signs: Temp: 98.7  F (37.1  C) Temp src: Oral BP: 117/71   Heart Rate: 94 Resp: 16 SpO2: 99 % O2 Device: None (Room air)    Weight: 104 lbs 4.8 oz    General Appearance: WDWN, pleasant and cooperative, resting comfortably in bed  Respiratory: Effort normal on RA. Lunfs CTAB throughout.   Cardiovascular: RRR, no m/r/g.  GI: abdomen soft, nt/nd. +BS  Skin: ecchymosis on L cheek - no skin breakdown  Extremities: no peripheral edema    Significant Results and Procedures   Results for orders placed or performed during the hospital encounter of 12/13/17   XR Chest Port 1 View    Narrative    XR CHEST PORT 1 VW 12/13/2017 11:13 PM    HISTORY: Endotracheal tube positioning;     COMPARISON: 12/13/2017    FINDINGS:   Endotracheal tube projects 2 cm above the coleman. Enteric tube  sidehole projects over the stomach. Right IJ central venous catheter  with tip in the low SVC.    Cardiac silhouette and pulmonary vasculature are within normal limits.  Left apical pleural thickening status post pleurodesis no pneumothorax  or pleural effusion. Mild left retrocardiac opacities, likely  representing atelectasis.      Impression    IMPRESSION: Endotracheal tube projects 2 cm above the coleman.    I have personally reviewed the examination and initial interpretation  and I agree  with the findings.    HUDSON WHITTAKER MD   XR Abdomen Port 1 View    Narrative    XR ABDOMEN PORT F1 VW 12/14/2017 6:27 AM    History: abdominal pain;     Comparison: CT abdomen pelvis 1/26/2017    Findings: Air-filled bowel in the mid abdomen. No pneumatosis or  portal venous gas. Right basilar opacities. Right base atelectasis and  possible small effusion.      Impression    Impression: Nonobstructive bowel gas pattern with nondilated loops of  bowel in the mid abdomen.     I have personally reviewed the examination and initial interpretation  and I agree with the findings.    ESTEE ARCINIEGA MD   XR Chest Port 1 View     Value    Radiologist flags Right mainstem bronchus (Urgent)    Narrative    EXAM: XR CHEST PORT 1 VW  12/14/2017 9:10 AM      HISTORY: Intubated    COMPARISON: Radiograph 12/13/2017    FINDINGS: AP radiograph of the chest. Endotracheal tube tip projects  over the right mainstem bronchus. Right IJ central venous catheter tip  projects over the low SVC. Cardiac silhouette is unremarkable. Small  pleural effusions, new from prior. New perihilar opacities. No  pneumothorax.       Impression    IMPRESSION:   1. Endotracheal tube tip projects over the right mainstem bronchus.  2. New perihilar opacities, likely pulmonary edema. Additional  considerations include atypical infection or atelectasis.  3. New small pleural effusions.    [Urgent Result: Right mainstem bronchus]    Finding was identified on 12/14/2017 9:23 AM.     MICU resident was contacted by Dr. Juan Juares at 12/14/2017 9:26 AM  and verbalized understanding of the urgent finding.     I have personally reviewed the examination and initial interpretation  and I agree with the findings.    DAVID VANCE MD   XR Abdomen Port 1 View    Narrative    Examination:  XR ABDOMEN PORT F1 VW 12/14/2017 10:11 AM     Comparison: 12/14/2017    History: Og placement;     Findings: There is a single supine view of the abdomen . There is  gaseous  distention of the stomach with a gastric tube tip projecting  over the fundus, sidehole likely beyond the gastroesophageal junction.  The small intestine and colon are not distended with scattered gas in  an overall nonobstructive pattern.  No evidence of pneumatosis. No  portal venous gas.    Dense retrocardiac/left basilar opacity with findings of volume loss,  concerning for atelectasis/collapse given prior right bronchial  intubation.      Impression    Impression:   1. Gaseous distention of the stomach. Gastric tube tip projecting over  the stomach with sidehole just distal to the GE junction.  2. Partially visualized dense retrocardiac/left basilar opacity with  findings of volume loss, concerning for atelectasis/lobar collapse  especially given right bronchial intubation earlier the same date.    I have personally reviewed the examination and initial interpretation  and I agree with the findings.    ZOE PINON MD   XR Chest Port 1 View    Narrative    EXAM: XR CHEST PORT 1 VW  12/14/2017 10:14 AM      HISTORY: Endotracheal tube retracted    COMPARISON: Radiograph 12/14/2017    FINDINGS: AP radiograph of the chest. Endotracheal tube projects 5 cm  above the coleman. Gastric tube side port projects over the stomach.  Right IJ [place] catheter tip projects over the low SVC. Stable  perihilar opacities. Stable small pleural effusions with associated  bibasilar atelectasis/consolidation.      Impression    IMPRESSION:   1. Endotracheal tube projects over the midthoracic trachea. Gastric  tube side port projects over the stomach.  2. Stable perihilar opacities, likely pulmonary edema. Again, atypical  infection or atelectasis cannot be excluded.  3. Stable small pleural effusions with associated bibasilar  atelectasis/consolidation.    I have personally reviewed the examination and initial interpretation  and I agree with the findings.    DAVID VANCE MD   XR Chest Port 1 View    Narrative    XR CHEST PORT  1 VW, 12/14/2017 7:14 PM.    Comparison: 12/14/2017.    History: Intubated, pulmonary edema; .    Findings:   Endotracheal tube projects over the high thoracic trachea. Gastric  tube courses toward stomach, tip not visualized, sidehole projecting  likely just beyond the gastroesophageal junction. Right IJ  catheter  tip projects over the low SVC. Increased diffuse patchy airspace  opacities with a perihilar predominance. Slightly decreased left  retrocardiac opacity. Small bilateral pleural and effusions bibasilar  opacities. No pneumothorax.       Impression    IMPRESSION:   1. Stable support devices.  2. Increased diffuse patchy airspace opacities with a perihilar  predominance, which may represent pulmonary edema or atypical  infection.  3. Decreased pleural effusions with associated bibasilar  atelectasis/consolidation. Slightly decreased dense left retrocardiac  opacity.    I have personally reviewed the examination and initial interpretation  and I agree with the findings.    ZOE PINON MD   XR Chest Port 1 View    Narrative    Exam:  Chest X-ray 12/15/2017 11:29 AM    History: Intubated, pulmonary edema;     Comparison: Chest x-ray dated 12/14/2017    Findings: AP portable chest x-ray at 45 degrees. The endotracheal tube  tip is approximately 5.3 cm above the coleman. Right IJ central venous  catheter with the tip projecting over the low SVC. Partially  visualized enteric tube with the sidehole projecting over the stomach.  The cardiac silhouette is stable. Redemonstration of diffuse patchy  airspace opacities. Surgical changes of left upper lobe wedge  resection. No pneumothorax. The upper abdomen is unremarkable.      Impression    Impression: Stable to slightly increase diffuse airspace opacities,  atypical infection versus pulmonary edema.    I have personally reviewed the examination and initial interpretation  and I agree with the findings.    DAVID VANCE MD   XR Chest Port 1 View    Narrative     Exam:  Chest X-ray 12/16/2017 6:25 AM    History: Intubated, pulmonary edema;     Comparison: Chest x-ray dated 12/15/2017    Findings: AP portable chest x-ray. The endotracheal tube tip in the  mid intrathoracic trachea. Right IJ central venous catheter with the  tip projecting over the low SVC. Partially visualized enteric tube  with the sidehole projecting over the stomach. The cardiac silhouette  is stable. Redemonstration of diffuse patchy airspace opacities.  Surgical changes of left upper lobe wedge resection. No pneumothorax.  The upper abdomen is unremarkable.      Impression    Impression: No significant change in diffuse airspace opacities,  atypical infection versus pulmonary edema.    I have personally reviewed the examination and initial interpretation  and I agree with the findings.    DAVID VANCE MD   CT Abdomen Pelvis w Contrast    Narrative    EXAMINATION: CT ABDOMEN PELVIS W CONTRAST, 12/16/2017 11:53 AM    TECHNIQUE:  Helical CT images from the lung bases through the  symphysis pubis were obtained  with IV contrast. Contrast dose: 45cc  isovue 370    COMPARISON: CT abdomen pelvis with contrast 1/26/2017    HISTORY: Ileus in the setting of calcium channel blockade, rising  lactate; calcium channel blocker overdose.    FINDINGS:  Lung bases: Right greater than left moderate pleural effusions with  associated dependent atelectasis. Associated basilar geographic areas  of groundglass and mild interlobular septal thickening. Mildly nodular  opacities within the left lung base. No significant pericardial  effusion.    Abdomen/pelvis: Gastric tube within the gastric lumen. Evaluation  limited by poor IV contrast bolus. Mild perihepatic ascites. Ascites  extends along the bilateral paracolic gutters and into the pelvis.  Stranding and thickening of the presacral region with diffuse  mesenteric and soft tissue edema. No evidence of gastric wall  thickening. Spleen is unremarkable. Adrenal glands are  within normal  limits. Appearance of the kidneys are unremarkable. No hydronephrosis  or hydroureter. Bladder is partially decompressed by a [place] catheter.  Mildly prominent bladder wall likely secondary to decompressed state.  There is nondependent gas within the bladder, likely iatrogenic.    Diffuse hyperdense material throughout the small bowel and colon  consistent with ingested materials. No dilated loops of bowel. No  findings suggestive of pneumatosis. No portal venous gas or late stage  evidence of ischemia. Uterus is unremarkable. Stable density adjacent  to the introitus (series 2, image 172), unchanged from 1/26/2017,  which may represent proteinaceous vaginal or Bartholin's gland cyst.  Not significantly changed.    Abdominal vasculature is patent and within normal limits. The portal  and hepatic veins are patent. The splenic vein is patent.    Asymmetric soft tissue edema of the left anterior thigh soft tissues  and fluid within the tracking along the anterior thigh muscle fascia.  No aggressive appearing osseous lesions.      Impression    IMPRESSION:   1. Recurrent left pleural effusions and patchy consolidative and  groundglass opacities of the lung bases, suggestive of pulmonary  edema, mildly nodular appearance predominantly the left lung base,  infection or aspiration remain within the differential.  2. Anasarca with asymmetric soft tissue edema and fluid along the  fascia of the left anterior thigh compartment.  3. Moderate abdominal ascites. No dilated loops bowel, pneumatosis,  portal venous gas or other late stage findings suggestive of bowel  ischemia.    I have personally reviewed the examination and initial interpretation  and I agree with the findings.    ESTEE ARCINIEGA MD   XR Chest Port 1 View    Narrative    Exam:  Chest X-ray 12/17/2017 6:45 AM    History: Intubated, pulmonary edema;     Comparison: Chest x-ray dated 12/16/2017    Findings: AP portable chest x-ray. The  endotracheal tube tip in the  mid intrathoracic trachea. Right IJ central venous catheter with the  tip projecting over the low SVC. Partially visualized enteric tube  with the sidehole projecting over the stomach. The cardiac silhouette  is stable. Redemonstration of diffuse patchy airspace opacities.  Surgical changes of left upper lobe wedge resection. No pneumothorax.  The upper abdomen is unremarkable.      Impression    Impression: Mild decrease in diffuse airspace opacities, atypical  infection versus pulmonary edema.    I have personally reviewed the examination and initial interpretation  and I agree with the findings.    DAVID VANCE MD   XR Chest Port 1 View    Narrative    EXAM: XR CHEST PORT 1 VW  12/17/2017 8:18 PM      HISTORY: Increasing oxygen requirements;     COMPARISON: Chest radiograph done earlier today, 12/16/2017,  12/15/2017    FINDINGS:     Single AP view of the chest. Endotracheal tube tip projects  approximately 4.4 cm above the coleman. Right IJ central venous  catheter tip projects at the the superior cavoatrial junction. Enteric  tube courses below the diaphragm with the tip collimated. The  cardiomediastinal silhouette is within normal limits. Increased  nodular opacities involving the bilateral lungs, most pronounced in  the right mid and bilateral lower lungs.     Postsurgical changes of left upper lobe wedge resection. Bilateral  small pleural effusions. No pneumothorax.       Impression    IMPRESSION:   1. Increased nodular opacities involving the bilateral lungs, most  pronounced in the right mid and bilateral lower lungs. Differential  worsening infection, hemorrhage and/or atelectasis.  2. Bilateral small pleural effusions.    I have personally reviewed the examination and initial interpretation  and I agree with the findings.    ESTEE ARCINIEGA MD   XR Chest Port 1 View    Narrative    Exam: XR CHEST PORT 1 VW, 12/18/2017 6:11 AM    Indication: Intubated, pulmonary edema;      Comparison: 12/17/2017    Findings:   There is a single semiupright view of the chest. There is a thin  enteric tube projecting over the esophagus and stomach with sidehole  distal to the GE junction. There is a right-sided central venous  catheter terminating at the mid SVC. Endotracheal tube projects over  the mid intrathoracic trachea. The heart is of normal size. The lungs  demonstrate interval decrease of nodular opacities, previously  described 12/17/2017 with stable diffuse background opacity the  lungs..      Impression    Impression: Interval decrease of nodular opacities involving the  bilateral lungs which were previously described 12/17/2017 with stable  diffuse parenchymal opacities in the lungs..    I have personally reviewed the examination and initial interpretation  and I agree with the findings.    DAVID VANCE MD   XR Chest Port 1 View    Narrative    Exam: XR CHEST PORT 1 VW, 12/19/2017 6:44 AM    Indication: Intubated, pulmonary edema, status post CCB overdose;     Comparison: 12/18/2017    Findings:   There is a single prone view of the chest. The right IJ catheter  terminates at the mid SVC. The enteric tube projects over the  esophagus and stomach and continues out of the field-of-view. There is  continued improvement of the bilateral nodular opacities with stable  appearance of the diffuse bilateral parenchymal opacity of lung.      Impression    Impression: Continued interval decrease of nodular opacities involving  the bilateral lungs with stable diffuse parenchymal opacities of the  lungs.    I have personally reviewed the examination and initial interpretation  and I agree with the findings.    DAVID VANCE MD   XR Chest Port 1 View    Narrative    Exam: XR CHEST PORT 1 VW, 12/19/2017 9:17 AM    Indication: intubation,status post CCB overdose;     Comparison: 12/19/2017    Findings:   There is a single semiupright view of the chest. There is a right IJ  CVC terminating in the mid  SVC. The endotracheal tracheal tube  terminates 2.5 cm above the coleman. There is a enteric tube projecting  over the esophagus and stomach and continues out of the field of view  inferiorly. Bilateral lung nodular opacities are unchanged from study  dated same day.      Impression    Impression:   1. The endotracheal tube with slightly advanced from 3 cm above the  coleman 2.5 cm.  2. Stable appearance of bilateral nodular opacities from earlier study  dated same day.    I have personally reviewed the examination and initial interpretation  and I agree with the findings.    DAVID VANCE MD   XR Chest Port 1 View    Narrative    Exam: XR CHEST PORT 1 VW, 12/20/2017 6:05 AM    Indication: Intubated, pulmonary edema;     Comparison: Yesterday    Findings:   Heart is within normal limits. Pulmonary vasculature is obscured by  the diffuse opacity throughout both lungs. Slight interval clearing  especially in the upper lobes. Small effusions may be present.    Support devices are stable.      Impression    IMPRESSION: Slight improvement in the diffuse bilateral opacities.    HUDSON WHITTAKER MD   US Abdomen Limited Portable    Narrative    Ultrasound abdomen limited portable 12/20/2017    COMPARISON: 1/27/2017, history: Dropping hemoglobin, concern for  bleeding in abdomen. Patient too unstable to go to CT scan.    TECHNIQUE: Limited grayscale evaluation of the abdomen with scanning  in all 4 quadrants for fluid.    FINDINGS: Trace perihepatic and right perirenal ascites. No other  fluid in the abdomen to suggest intra-abdominal bleeding. No focal  abnormality within the partially visualized liver or kidney.      Impression    IMPRESSION: Trace perihepatic and right perirenal ascites. No  sonographic evidence for intra-abdominal hemorrhage.    JAMES HOWELL MD ([place])   XR Chest Port 1 View    Narrative    Exam: XR CHEST PORT 1 VW, 12/20/2017 4:54 PM    Indication: RN placed PICC - verify tip placement    Comparison:  Radiograph 12/20/2017, 12/19/2017, 3/8/2017    Findings:   A single AP view of the chest was obtained. The endotracheal tube tip  projects of the mid trachea. The left upper extremity PICC tip  projects over the low SVC. The right IJ catheter tip projects over the  mid/lower SVC. The NG/OG tube tip extends below the field-of-view with  the sidehole projecting over the stomach. The cardiomediastinal  silhouette is unchanged. No pneumothorax. Stable suture line in the  left apex. No substantial pleural effusion. Stable to mildly improved  basilar predominant diffuse hazy and interstitial opacities.      Impression    Impression:   1. The new left upper extremity PICC tip projects over the low SVC.  2. Stable to mildly improved diffuse pulmonary opacities, which may  represent ARDS, pulmonary edema, or infection.    I have personally reviewed the examination and initial interpretation  and I agree with the findings.    DILLON RAMIREZ MD   CT Head w/o Contrast    Narrative    CT HEAD W/O CONTRAST 12/21/2017 12:16 AM    Provided History: dilated pupils - r/o IC bleed;     Comparison: CT 12/13/2017, 3/9/2017.    Technique: Using multidetector thin collimation helical acquisition  technique, axial, coronal and sagittal CT images from the skull base  to the vertex were obtained without intravenous contrast.     Findings:      Generalized decreased gray-white differentiation. Mild diffuse  cortical sulci effacement. No obvious herniation.     No intracranial hemorrhage, mass effect, or midline shift. The  ventricles are proportionate to the cerebral sulci. The gray to white  matter differentiation of the cerebral hemispheres is preserved. The  basal cisterns are patent.    Mild mucosal thickening involving the bilateral sphenoid sinuses.  Small dependent fluid in the bilateral maxillary sinuses. Fluid in the  bilateral mastoid air cells.      Impression    Impression:   1. No acute intracranial pathology.  2. Mild mucosal  thickening involving the bilateral sphenoid sinuses.  Small dependent fluid in the bilateral maxillary sinuses. Fluid in the  bilateral mastoid air cells. These findings are all new since the  prior study.    I have personally reviewed the examination and initial interpretation  and I agree with the findings.    TIMOTHY BAER MD   XR Chest Port 1 View    Narrative    Exam: XR CHEST PORT 1 VW, 12/21/2017 6:24 AM    Indication: Intubated, pulmonary edema;     Comparison: 12/20/2017    Findings:   There is a single some upright view of the chest. Endotracheal  tracheal tube in place in the mid thoracic trachea approximately 2.5  cm above the coleman. There is a left-sided PICC line terminating over  the inferior SVC. There is a right-sided IJ CVC projecting over the  mid SVC. There is a enteric tube projecting over the esophagus and  stomach and proceeding outside the field-of-view. The sidehole is  distal to the GE junction. The cardiomediastinal silhouette is stable.  The upper abdomen is unremarkable. Stable to slightly improved diffuse  hazy and interstitial opacities, worse in the lower lobe.      Impression    Impression: Stable versus slightly improved diffuse patchy opacities  which could represent pulmonary edema, infection, and/or ARDS.    I have personally reviewed the examination and initial interpretation  and I agree with the findings.    DAVID VANCE MD   XR Chest Port 1 View    Narrative    Exam: XR CHEST PORT 1 VW, 12/22/2017 6:22 AM    Indication: Intubated, pulmonary edema;     Comparison: 12/21/2017    Findings:   There is a single semiupright view of the chest.Endotracheal tube  projecting over the mid thoracic trachea approximately 3 cm above the  coleman. There is a enteric tube projecting over the esophagus and  stomach extending beyond the field-of-view, the sidehole is distal GE  junction. Left-sided PICC line projecting over the cavoatrial  junction. There is a right IJ CVC projecting over  the cavoatrial  junction. There are increased hazy and interstitial opacities over the  right lower lobe.      Impression    Impression:   1. Stable support devices associated above.  2. Slightly increased hazy and interstitial opacities over the right  lower lobe. This could represent slightly worsening pulmonary edema,  infection and or ARDS.    I have personally reviewed the examination and initial interpretation  and I agree with the findings.    DAVID VANCE MD   XR Abdomen Port 1 View    Narrative    Exam: Abdominal x-ray, 1 view, 12/22/2017.    COMPARISON: 12/14/2017.    HISTORY: Concern for C diff.    FINDINGS: Supine view of the abdomen was obtained. The left lateral  portions of the abdomen were collimated out of the image.  Nonobstructive bowel gas pattern. NG/OG tube with its tip and sidehole  projecting over the stomach. No pneumatosis. No portal venous gas.  Evaluation of free air is limited in the supine position.      Impression    IMPRESSION: Nonobstructive bowel gas pattern. NG/OG tube with its tip  and sidehole projecting over the stomach.    BAILEY GONZALEZ MD   XR Chest Port 1 View    Narrative    Exam: XR CHEST PORT 1 VW, 12/22/2017 10:00 PM    Indication: Patient intubated    Comparison: 12/22/2017, 12/21/2017, 12/20/2017    Findings:   A single portable AP view the chest was obtained. The endotracheal  tube tip projects over the mid trachea. The enteric tube passes below  the field-of-view. The right IJ catheter tip projects over the low  SVC. The left upper extremity PICC tip projects over the cavoatrial  junction. The cardiomediastinal silhouette is within normal limits  given patient rotation. No pneumothorax or substantial pleural  effusion although the right costophrenic angle is not included.  Postoperative changes of wedge resection in the left apex. Stable to  mildly improved bibasilar opacities.      Impression    Impression:   1. Stable to mildly improved bibasilar hazy  airspace opacities since  earlier on the same date.  2. Stable support devices.    I have personally reviewed the examination and initial interpretation  and I agree with the findings.    OMER PACE MD   XR Chest Port 1 View    Narrative    EXAM: XR CHEST PORT 1 VW  12/23/2017 6:36 AM      HISTORY: patient intubated;     COMPARISON: Chest radiograph 12/22/2017, 12/21/2017    Findings:   A single portable AP view the chest was obtained. The endotracheal  tube tip projects 2.4 cm above the coleman. The enteric tube extends  below the field-of-view. The right IJ catheter tip projects at the  cavoatrial junction. The left upper extremity PICC tip projects in the  high right atrium.     The cardiomediastinal silhouette is within normal limits. No  pneumothorax or substantial pleural effusion. Stable left lung apex  scarring/fluid.     Postoperative changes of wedge resection in the left apex. Slightly  increased diffuse hazy and interstitial opacities.      Impression    Impression:   1. Slightly increased diffuse opacities, representing worsening  pulmonary edema, infection and/or ARDS.  2. Stable support devices.    I have personally reviewed the examination and initial interpretation  and I agree with the findings.    KIERAN MCDONALD MD   XR Chest Port 1 View    Narrative    Exam: XR CHEST PORT 1 VW, 12/24/2017 6:29 AM    Indication: Patient intubated     Comparison: 12/23/2017, 12/22/2017, 12/11/2017    Findings:   A single AP view of the chest was obtained. The endotracheal tube tip  projects over the mid trachea. The enteric tube passes below the  field-of-view with the sidehole projecting over the stomach. The left  upper extremity PICC tip projects over the superior cavoatrial  junction. The right IJ catheter tip projects over the low SVC. The  cardiomediastinal silhouette is unchanged. No pneumothorax. Suspected  trace left pleural effusion. Stable to mildly increased diffuse hazy  bilateral mixed  opacities.      Impression    Impression:   1. Stable to mildly increased diffuse pulmonary opacities.  2. Stable support devices.    I have personally reviewed the examination and initial interpretation  and I agree with the findings.    OMER PACE MD   XR Chest Port 1 View    Narrative    Exam: XR CHEST PORT 1 VW, 12/25/2017 2:00 PM    Indication: ARDS resolution     Comparison: 12/24/2017    Findings:   A single AP view of the chest was obtained. Endotracheal tube and  gastric tube have been removed. The left upper extremity PICC tip  projects over the superior cavoatrial junction. The right IJ catheter  tip projects over the low SVC. The cardiomediastinal silhouette is  unchanged. No pneumothorax. Fetal trace left pleural effusion. Waxing  and waning diffuse hazy bilateral mixed opacities. Wedge resection  changes in the left apex. Mild gaseous distention of the stomach.      Impression    Impression:  1. Waxing and waning bilateral diffuse pulmonary opacities.  2. Interval removal of endotracheal and enteric tubes.    I have personally reviewed the examination and initial interpretation  and I agree with the findings.    OMER PACE MD   US Abddomen Limited w Abd/Pelvis Duplex Complete    Narrative    EXAMINATION: US ABDOMEN LIMITED WITH DOPPLER COMPLETE, 12/26/2017  11:37 AM     COMPARISON: Ultrasound dating 12/20/2017.    HISTORY: Elevated alkaline phosphatase, concern for obstruction.    FINDINGS:   Fluid: No evidence of ascites or pleural effusions.    Liver: The liver demonstrates normal echotexture, measuring 19.4 cm in  craniocaudal dimension. There is no focal mass. Main portal vein  measures 9 mm in diameter    Extrahepatic portal vein flow is antegrade, measuring 23 cm/sec.  Right portal vein flow is antegrade, measuring 33 cm/sec.  Left portal vein flow is antegrade, measuring 21 cm/sec.    Flow in the hepatic artery is towards the liver and:  185 cm/sec peak systolic  0.62 resistive index.      The splenic vein is not visualized.  The left, middle, and right  hepatic veins are patent with flow towards the IVC. The IVC is patent  with flow towards the heart.   The aorta is not dilated.    Gallbladder: Layering sludge within the lumen of the gallbladder.  There is no wall thickening, pericholecystic fluid, positive  sonographic Bernstein's sign or evidence for cholelithiasis.    Bile Ducts: Both the intra- and extrahepatic biliary system are of  normal caliber.  The common bile duct measures 2 mm in diameter.    Pancreas: Obscured by overlying bowel gas.     Kidney: The right kidney measures 10.9 cm long. Parenchyma is of  normal thickness. No focal mass or hydronephrosis., no shadowing renal  calculi, cystic lesion or mass.         Impression    IMPRESSION: Gallbladder sludge without evidence of cholelithiasis or  cholecystitis. Otherwise normal right upper quadrant ultrasound with  Doppler assessment.    KIERAN MCDONALD MD   XR Chest Port 1 View    Narrative    Exam: XR CHEST PORT 1 VW, 12/26/2017 12:32 PM    Indication: ARDS, interval changes;     Comparison: 12/25/2017    Findings:   Single AP upright view of the chest. Right-sided IJ central venous  catheter and left-sided PICC line projecting over the cavoatrial  junction. The cardiomediastinal silhouette and upper abdomen are  unremarkable. Small bilateral pleural effusions. Diffuse patchy and  interstitial opacities appear stable from previous. No pneumothorax.      Impression    Impression:   1. Stable support devices as listed above.  2. Stable appearance of bilateral patchy and interstitial opacities.    I have personally reviewed the examination and initial interpretation  and I agree with the findings.    HUDSON WHITTAKER MD   XR Chest Port 1 View    Narrative    Exam: XR CHEST PORT 1 VW, 12/27/2017 12:45 PM    Indication: ARDS, internal improvement;     Comparison: 12/26/2017    Findings:   There is a single portable view of the chest. Interval  removal of  right central venous catheter. There is a left-sided PICC projecting  over the cavoatrial junction. The cardiomediastinum is unremarkable.  The upper abdomen is unremarkable. There are small bilateral pleural  effusions. Diffuse mixed interstitial and patchy opacities. There is  no pneumothorax.      Impression    Impression:   1. Stable left-sided PICC with interval removal of right IJ central  venous catheter.  2. Stable appearing mixed interstitial and patchy opacities.    I have personally reviewed the examination and initial interpretation  and I agree with the findings.    HUDSON WHITTAKER MD   MR Abdomen MRCP w/o & w Contrast    Narrative    MRCP Without and With Contrast    CLINICAL HISTORY: ALP elevation without evidence of stone on US;     DATE: 12/27/2017 6:30 PM    TECHNIQUE:     Images were acquired with and without intravenous gadolinium contrast  through the upper abdomen. The following MR images were acquired  without intravenous contrast: TrueFISP, multiplanar T2-weighted, axial  T1 in/out of phase, T2-weighted MRCP images, axial diffusion-weighted  and axial apparent diffusion coefficient. T1-weighted images were  obtained before contrast at the multiple time points following  contrast injection. 3-D reformatted images were generated by the  technologist. Contrast dose: 7.5mL Gadavist. Exam limited due to  motion artifact.    Comparison study: CT 12/16/2017, outside CT 1/26/2017 and ultrasound  12/26/2017    FINDINGS:    Biliary Tree: No intrahepatic or extra hepatic biliary ductal  dilatation. No signal defect to suggest choledocholithiasis. Low  insertion of the cystic duct.    Pancreas: No focal pancreatic lesion. Preserved T1 hyperintensity of  the pancreas on T1-weighted fat-suppressed images. Main pancreatic  duct is not dilated.    Liver: Noncirrhotic hepatic configuration. No steatosis or iron  deposition. No worrisome liver lesion.    Gallbladder: No cholelithiasis.    Spleen:  Normal. Not enlarged.    Kidneys: Normal. No hydronephrosis.    Adrenal glands: Negative.    Bowel: No bowel obstruction.    Lymph nodes: No upper abdominal lymphadenopathy.    Blood vessels: No abdominal aortic aneurysm. Hepatic and portal venous  systems are patent.    Lung bases: Areas of consolidation in both lung lung bases. Trace  bilateral pleural effusions.    Bones and soft tissues: No suspicious lesion.    Mesentery and abdominal wall: Normal. Abnormal soft tissue  posteromedial to the fundus of the stomach, similar to outside CT  1/26/2017. No associated enhancement. As per care everywhere, this  seems to have been evaluated in the past previous exams. The largest  component of this is T2 hyperintense, T1 isointense and measures 2.6  cm in maximum axial dimension.    Ascites: None      Impression    IMPRESSION:   1. No intrahepatic or extra hepatic biliary ductal dilatation. No  cholelithiasis or choledocholithiasis.  2. Abnormal soft tissue posterior to the fundus of the stomach,  similar to outside CT 1/26/2017. Comparison with old exams/biopsy  report which have been done as per care everywhere is recommended. The  differential diagnosis for such an appearance include  mesenteric/gastric gastrointestinal stromal tumor, gastric duplication  cyst; unlikely to represent a gastric diverticulum in view of no air  noted on any of these exams.  3. Bibasilar pulmonary opacities, being followed up by chest  radiographs.    I have personally reviewed the examination and initial interpretation  and I agree with the findings.    KATERINA ALTAMIRANO MD   XR Chest Port 1 View    Narrative    Exam: XR CHEST PORT 1 VW, 12/28/2017 10:13 AM    Indication: ARDS, interval changes;     Comparison: 12/27/2017    Findings:   Single portable view of the chest. Left-sided PICC line projecting  over the cavoatrial junction. The cardiomediastinal silhouette is  normal. Pulmonary vasculature remains indistinct. Small  bilateral  pleural effusions are unchanged. Diffuse patchy and interstitial  opacities are unchanged. No pneumothorax.      Impression    Impression: Stable diffuse and patchy bilateral opacities, small  bilateral pleural effusions and mild pulmonary edema.    I have personally reviewed the examination and initial interpretation  and I agree with the findings.    JAMES HOWELL MD ([place])   XR Abdomen Port 1 View    Narrative    Exam: Abdominal x-ray, 12/28/2017.    COMPARISON: 12/27/2017.    HISTORY: Diarrhea, positive for C. difficile. Concern for toxic  megacolon.    FINDINGS: Supine view of the abdomen was obtained. Nonobstructive  bowel gas pattern with scattered gas-filled loops of predominantly  large bowel, not dilated. The inferior portions of the abdomen were  collimated out of the image. No pneumatosis. No portal venous gas.  Evaluation of free air is limited in the supine position. Prominent  right lobe of the liver correlating with MRI 12/27/2017.      Impression    IMPRESSION: Nonobstructive bowel gas pattern with scattered gas-filled  loops of predominantly large bowel, not dilated.    BAILEY GONZALEZ MD   XR Chest 2 Views    Narrative    Exam: XR CHEST 2 VW 1/1/2018 10:42 AM    History: ARDS, new cough    Comparison: 12/20/2017    Findings: AP and lateral views of the chest were evaluated. Left upper  extremity PICC tip projects over the high right atrium.  Cardiomediastinal silhouette is within normal limits. Indistinct  appearance of the pulmonary vasculature. Small stable left and small  to moderate increasing right pleural effusions. There are diffuse  patchy opacities throughout both lungs, most pronounced peripherally  and at the base in the right lung. Slight increase at the right apex.  There is no pneumothorax. Visualized upper abdomen is unremarkable. No  acute bony abnormality.      Impression    Impression: Diffuse patchy pulmonary opacities, most pronounced in the  right hemithorax are  stable to slightly increased. Increase is most  pronounced at the right upper lobe/apex. Pleural effusions have  slightly increased.    KATERINA ALTAMIRANO MD   XR Chest Port 1 View    Narrative    Exam: XR CHEST PORT 1 VW, 1/3/2018 12:41 AM    Indication: Cough, elevated lactic acid    Comparison: 1/1/2018, 12/28/2017, 12/27/2017    Findings:   A single AP view of the chest was obtained. The left upper extremity  PICC tip projects over the superior cavoatrial junction. The  cardiomediastinal silhouette is within normal limits. No pneumothorax.  Stable small pleural effusions bilaterally. Postoperative changes of  left upper lobe wedge resection. Grossly stable peripheral pulmonary  opacities bilaterally, right greater than left.      Impression    Impression:   1. Stable diffuse pulmonary opacities when compared with 1/1/2018,  right greater than left, which may represent infection, aspiration, or  ARDS, all these pulmonary parenchymal abnormalities have developed  since 12/13/2017.  2. Stable small pleural effusions.    I have personally reviewed the examination and initial interpretation  and I agree with the findings.    DILLON RAMIREZ MD        Primary Care Physician   Ander Fuchs    Discharge Disposition   Discharged to [facility]  Condition at discharge: Stable    Discharge Orders     General info for SNF   Length of Stay Estimate: Short Term Care: Estimated # of Days <30  Condition at Discharge: Improving  Level of care:skilled   Rehabilitation Potential: Good  Admission H&P remains valid and up-to-date: Yes  Recent Chemotherapy: N/A  Use Nursing Home Standing Orders: Yes     Mantoux instructions   Give two-step Mantoux (PPD) Per Facility Policy Yes     Reason for your hospital stay   Ana Laura Wharton is a 47 year old female with history of HTN, bipolar disorder, anxiety, depression with multiple suicide attempts, 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 ICU course c/b lactic acidosis, acute hypoxic/hypercarbic respiratory failure 2/2 aspiration requiring intubation c/b ARDS, ARF, severe hypotension/vasogenic shock requiring pressors, paroxysmal A.fib, intermittent SVT, and sepsis 2/2 sinusitis. Patient extubated 12/24/17, transferred to medicine 12/25 for ongoing care. She did well once transferred to medicine floor. Her suicidal ideation resolved and she did well once bedside attendant removed. Her c.diff improved and was well controlled on vancomycin. Her main ongoing issue remained deconditioning. She was discharged to [facility] on 1/4/18.     Wound care (specify)   Cheek wound: wash BID with saline and gauze and gently rub vaseline on cheek wounds  Left forearm: cleanse with saline solution     Activity - Up with nursing assistance     Follow Up and recommended labs and tests   - Have routine CMP and CBC checked every Monday while at [facility].  - Needs repeat TSH, T4 on 1/8/17 with dose adjustments to be made to synthroid if needed  - Needs to continue to follow with psychiatry as needed while at [facility]. Will need intensive day program treatment on discharge from [facility]  - Needs to follow up with GI (once discharge from [facility], Dr Dennis) for monitoring and treatment of C.Diff  - Needs to follow up with PCP once discharge from [facility]  - Needs biopsy fundus of stomach as outpatient once discharge from [facility]     Additional Discharge Instructions   - Continue aggressive pulmonary toilet and acapella  - Continue vancomycin taper as ordered: 125mg qid x 14days, 125mg TID x 7d, 125mg BID x 7d, 125mg QD x 7d, 125mg QOD x 7d  - If patient needs any additional antibiotics, need to discuss increasing vancomycin with primary Gi provider (Dr Dennis)     Full Code     Physical Therapy Adult Consult   Evaluate and treat as clinically indicated.    Reason:  deconditioning     Occupational Therapy  Adult Consult   Evaluate and treat as clinically indicated.    Reason:  deconditioning     Contact Isolation     Fall precautions     Advance Diet as Tolerated   Follow this diet upon discharge: regular diet, ensure plus between meals       Discharge Medications   Current Discharge Medication List      START taking these medications    Details   benzonatate (TESSALON) 100 MG capsule Take 1 capsule (100 mg) by mouth 3 times daily as needed for cough  Qty: 42 capsule    Associated Diagnoses: Cough      carvedilol (COREG) 6.25 MG tablet 1 tablet (6.25 mg) by Oral or Feeding Tube route 2 times daily  Qty: 60 tablet    Associated Diagnoses: Paroxysmal atrial fibrillation (H)      cloNIDine (CATAPRES) 0.1 MG tablet Take 1 tablet (0.1 mg) by mouth 3 times daily  Qty: 60 tablet    Associated Diagnoses: Essential hypertension      enoxaparin (LOVENOX) 40 MG/0.4ML injection Inject 0.4 mLs (40 mg) Subcutaneous every 24 hours    Associated Diagnoses: Physical deconditioning      folic acid (FOLVITE) 1 MG tablet Take 1 tablet (1 mg) by mouth daily  Qty: 30 tablet    Associated Diagnoses: Alcoholic intoxication with complication (H)      LORazepam (ATIVAN) 1 MG tablet Take 1-2mg every 6 hours as needed through 1/4. Starting 1/5, take 1-2mg every 8 hours as needed.  Qty: 60 tablet    Associated Diagnoses: Bipolar I disorder (H)      thiamine 100 MG tablet Take 1 tablet (100 mg) by mouth daily    Associated Diagnoses: Alcoholic intoxication with complication (H)         CONTINUE these medications which have CHANGED    Details   mirtazapine (REMERON) 15 MG tablet Take 1 tablet (15 mg) by mouth At Bedtime  Qty: 30 tablet    Associated Diagnoses: Bipolar I disorder (H)      vancomycin (VANOCIN) 50 mg/mL LIQD solution Take 125mg QID for 14 days, then take 125mg TID for 7 days, then take 125mg BID for 7 days, then take 125mg QD x 7d, then take 125mg QOD x 7d.    Associated Diagnoses: Clostridium difficile colitis         CONTINUE these  "medications which have NOT CHANGED    Details   OLANZapine (ZYPREXA) 5 MG tablet Take 1 tablet (5 mg) by mouth 2 times daily May take extra dose prn paranoia.  Qty: 180 tablet, Refills: 0    Associated Diagnoses: Bipolar I disorder (H)      IBUPROFEN PO Take 400-600 mg by mouth every 6 hours as needed for moderate pain      Levothyroxine Sodium (SYNTHROID PO) Take 50 mcg by mouth daily      Ondansetron (ZOFRAN ODT PO) Take 4 mg by mouth every 8 hours as needed for nausea      Pantoprazole Sodium (PROTONIX PO) Take 40 mg by mouth every morning (before breakfast)          STOP taking these medications       clonazePAM (KLONOPIN) 1 MG tablet Comments:   Reason for Stopping:         hydrOXYzine (ATARAX) 50 MG tablet Comments:   Reason for Stopping:         oxyCODONE-acetaminophen (PERCOCET) 5-325 MG per tablet Comments:   Reason for Stopping:         Eletriptan Hydrobromide (RELPAX PO) Comments:   Reason for Stopping:         HYDROXYZINE HCL PO Comments:   Reason for Stopping:         desogestrel-ethinyl estradiol (APRI) 0.15-30 MG-MCG per tablet Comments:   Reason for Stopping:         triamcinolone (KENALOG) 0.1 % cream Comments:   Reason for Stopping:         clindamycin (CLEOCIN T) 1 % lotion Comments:   Reason for Stopping:         TIZANIDINE HCL PO Comments:   Reason for Stopping:         Ketorolac Tromethamine (TORADOL IM) Comments:   Reason for Stopping:         Acetaminophen (TYLENOL 8 HOUR PO) Comments:   Reason for Stopping:             Allergies   Allergies   Allergen Reactions     Amoxicillin-Pot Clavulanate      PN: LW Reaction: Itching, Pruritis     Azithromycin      Other reaction(s): Dermatitis     Cephalexin      PN: LW Reaction: Itching, Pruritis     Ciprofloxacin Other (See Comments)     Joint pain cdiff     Compazine [Prochlorperazine] Other (See Comments)     dystonia     Metoclopramide Other (See Comments)     \"I feel like I am crawling out of my skin\"     Minocycline Nausea and Vomiting     PN: " "DIARRHEA, VOMITING, AND STOMACH CRAMPS     Penicillins Itching     Reglan [Metoclopramide Hcl] Other (See Comments)     Sensation of \"crawling out of skin\"     Restoril [Temazepam]      Thorazine [Chlorpromazine] Other (See Comments)     dystonia     Abilify [Aripiprazole] Rash     Lamotrigine Rash     Severe drug rash - contraindication to receiving again. Skin peeling.      Sulfa Drugs Rash       "

## 2018-01-05 ENCOUNTER — APPOINTMENT (OUTPATIENT)
Dept: PHYSICAL THERAPY | Facility: CLINIC | Age: 48
DRG: 917 | End: 2018-01-05
Attending: SURGERY
Payer: COMMERCIAL

## 2018-01-05 ENCOUNTER — APPOINTMENT (OUTPATIENT)
Dept: OCCUPATIONAL THERAPY | Facility: CLINIC | Age: 48
DRG: 917 | End: 2018-01-05
Attending: SURGERY
Payer: COMMERCIAL

## 2018-01-05 VITALS
HEART RATE: 83 BPM | DIASTOLIC BLOOD PRESSURE: 77 MMHG | WEIGHT: 104.3 LBS | SYSTOLIC BLOOD PRESSURE: 128 MMHG | RESPIRATION RATE: 18 BRPM | BODY MASS INDEX: 18.48 KG/M2 | TEMPERATURE: 97.8 F | OXYGEN SATURATION: 96 %

## 2018-01-05 LAB — INR PPP: 1.1 (ref 0.86–1.14)

## 2018-01-05 PROCEDURE — 25000128 H RX IP 250 OP 636: Performed by: INTERNAL MEDICINE

## 2018-01-05 PROCEDURE — 25000132 ZZH RX MED GY IP 250 OP 250 PS 637: Performed by: PHYSICIAN ASSISTANT

## 2018-01-05 PROCEDURE — 97530 THERAPEUTIC ACTIVITIES: CPT | Mod: GP

## 2018-01-05 PROCEDURE — 36592 COLLECT BLOOD FROM PICC: CPT | Performed by: STUDENT IN AN ORGANIZED HEALTH CARE EDUCATION/TRAINING PROGRAM

## 2018-01-05 PROCEDURE — 97110 THERAPEUTIC EXERCISES: CPT | Mod: GP

## 2018-01-05 PROCEDURE — 25000132 ZZH RX MED GY IP 250 OP 250 PS 637: Performed by: INTERNAL MEDICINE

## 2018-01-05 PROCEDURE — 40000193 ZZH STATISTIC PT WARD VISIT

## 2018-01-05 PROCEDURE — 25000132 ZZH RX MED GY IP 250 OP 250 PS 637

## 2018-01-05 PROCEDURE — 97535 SELF CARE MNGMENT TRAINING: CPT | Mod: GO

## 2018-01-05 PROCEDURE — 85610 PROTHROMBIN TIME: CPT | Performed by: STUDENT IN AN ORGANIZED HEALTH CARE EDUCATION/TRAINING PROGRAM

## 2018-01-05 PROCEDURE — 40000133 ZZH STATISTIC OT WARD VISIT

## 2018-01-05 PROCEDURE — 99239 HOSP IP/OBS DSCHRG MGMT >30: CPT | Performed by: PHYSICIAN ASSISTANT

## 2018-01-05 PROCEDURE — G0463 HOSPITAL OUTPT CLINIC VISIT: HCPCS

## 2018-01-05 RX ORDER — LORAZEPAM 1 MG/1
TABLET ORAL
Qty: 60 TABLET | Status: SHIPPED | DISCHARGE
Start: 2018-01-05 | End: 2018-02-07

## 2018-01-05 RX ORDER — LORAZEPAM 1 MG/1
1-2 TABLET ORAL EVERY 8 HOURS PRN
Status: DISCONTINUED | OUTPATIENT
Start: 2018-01-05 | End: 2018-01-05 | Stop reason: HOSPADM

## 2018-01-05 RX ORDER — SUMATRIPTAN 25 MG/1
25 TABLET, FILM COATED ORAL ONCE
Status: COMPLETED | OUTPATIENT
Start: 2018-01-05 | End: 2018-01-05

## 2018-01-05 RX ADMIN — CLONIDINE HYDROCHLORIDE 0.1 MG: 0.1 TABLET ORAL at 08:34

## 2018-01-05 RX ADMIN — ENOXAPARIN SODIUM 40 MG: 40 INJECTION SUBCUTANEOUS at 13:51

## 2018-01-05 RX ADMIN — CARVEDILOL 6.25 MG: 6.25 TABLET, FILM COATED ORAL at 08:35

## 2018-01-05 RX ADMIN — OLANZAPINE 5 MG: 5 TABLET, ORALLY DISINTEGRATING ORAL at 08:34

## 2018-01-05 RX ADMIN — IBUPROFEN 600 MG: 200 TABLET, FILM COATED ORAL at 08:33

## 2018-01-05 RX ADMIN — Medication 125 MG: at 08:35

## 2018-01-05 RX ADMIN — LEVOTHYROXINE SODIUM 50 MCG: 25 TABLET ORAL at 08:35

## 2018-01-05 RX ADMIN — CLONIDINE HYDROCHLORIDE 0.1 MG: 0.1 TABLET ORAL at 13:53

## 2018-01-05 RX ADMIN — FOLIC ACID 1 MG: 1 TABLET ORAL at 08:35

## 2018-01-05 RX ADMIN — PANTOPRAZOLE SODIUM 40 MG: 40 TABLET, DELAYED RELEASE ORAL at 08:34

## 2018-01-05 RX ADMIN — LORAZEPAM 1 MG: 1 TABLET ORAL at 17:38

## 2018-01-05 RX ADMIN — LORAZEPAM 1 MG: 1 TABLET ORAL at 03:47

## 2018-01-05 RX ADMIN — Medication 100 MG: at 08:33

## 2018-01-05 RX ADMIN — Medication 125 MG: at 13:53

## 2018-01-05 RX ADMIN — Medication 125 MG: at 16:24

## 2018-01-05 RX ADMIN — SUMATRIPTAN 25 MG: 25 TABLET, FILM COATED ORAL at 08:55

## 2018-01-05 NOTE — PLAN OF CARE
Problem: Patient Care Overview  Goal: Plan of Care/Patient Progress Review  Outcome: No Change  9908-9924:     Reason for admission: overdose  Vitals: VSS on RA  Activity: assist x1 plus walker and bed alarm  Pain: no c/o pain, gave PRN Ativan 1 mg tab x1 for anxiety  Neuro: WNL ex anxious  Cardiac: WNL  Respiratory: WNL ex diminished  GI/: flat abd, intermittent incontinence - uses bedpan most of time, diarrhea (c diff), last BM today  Diet: Regular, fair appetite  Lines: L triple PICC SL  Wounds: bilateral cheeks, L arm AC  Labs/imaging: Mg 1.9     New changes this shift: pt refused Mg replacement     Plan: pamela KEVIN @ 1 pm tomorrow with HE transport (pending insurance approval)     Continue to monitor and follow POC

## 2018-01-05 NOTE — PLAN OF CARE
Problem: Patient Care Overview  Goal: Plan of Care/Patient Progress Review  Outcome: No Change  Pt A/Ox4, VSS and C/O pain d/t migrane. Imitrex ordered up and given, minor relief. Up w/ A1, shower and oral cares done with OT. Reg diet. Incont of x1 BM and x1 urination. Pt normally continent but has urgency issues at time. Linens changed. L PICC removed. MD aware pt currently has no IV access. Electrolyte replacement protocols DC'd. FELICIA sending referals to TCU's, [facility] still accepting pt but insurance will not cover at this time.

## 2018-01-05 NOTE — PROGRESS NOTES
1/5/2018 11:27 AM   DC ride rearranged for 630 this evening.  Continue to pend [facility] for insurance approval.  SW send more referrals to TCU's, SW will update insurance with approvals or denials.  RNCC will update dc plan as updates come in.  [facility] continuing to accept pt for dc td.        Clarissa Mccormick, KISHACC, BSN    [facility]  [address] [place] [id number]    [email or web address]    Office: [phone] Pager: [phone]

## 2018-01-05 NOTE — PLAN OF CARE
Problem: Patient Care Overview  Goal: Plan of Care/Patient Progress Review  Discharge Planner OT   Patient plan for discharge: none stated  Current status: Pt completed bed mobility supine to sit at EOB with rest break before completing sit to stand with use of walker and CGA. Ambulated in room to bathroom with walker and CGA. Pt completed full body shower with set up, doffed and donned clothing with min/mod A. Provided education on seated dressing using figure four technique. Pt completed g/h standing at sink with chair behind-seated rest breaks throughout.   Barriers to return to prior living situation: fatigue, weakness, ADL deficits, limited mobiltiy  Recommendations for discharge: Per plan established by the OT, the recommendation for dc location is TCU  Rationale for recommendations: pt not safe to return to home at current level without continued OT to progress ADLs and mobiltiy

## 2018-01-05 NOTE — PROGRESS NOTES
[facility], [place]  Internal Medicine Progress Note - [facility] Service    Patient: Ana Laura Wharton  MRN: [id number]  Admission Date: 12/13/2017  Hospital Day # 23    Assessment & Plan   Intentional overdose 12/13 with bipolar disorder, depression, anxiety, etoh abuse, possible benzodiazapine w/d: Overdose with amlodipine, Tylenol, Nyquil and Etoh. PTA drinking 16 oz vodka daily for 1.5 days. Treated with Ca gtt, high dose insulin, and NAC per poison control. Extubated 12/24. Sitter discontinued 12/28 per psych recs. Psych has been following at least weekly, last seen 1/2, recommend med psych unit (patient not a candidate) vs rehab. [facility] accepted patient on 1/4/18. Once completes rehab, will need intensive day treatment program. Cont remeron 15mg qhs and olazapine 5mg BID. Cont prn ativan 1-2mg q6h prn through 1/4, then decrease to 1-2mg q8h prn starting 1/5. Cont thiamine and folic acid. Psych to cont to follow at [facility] as needed, would benefit from weekly visits if needed.       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 C.diff +12/22. AXR 12/28 no acute findings. Currently on po vancomycin with slow improvement in stooling, unfortunately had to start abx for UTI as below. Has had decreasing stools while on vancomycin, only 1-2 loose stools daily. GI followed and recommended starting long vancomycin as follows: 125mg qid x 14days, 125mg TID x 7d, 125mg BID x 7d, 125mg QD x 7d, 125mg QOD x 7d. If patient needs recurrent antibiotics, need to discuss vanco recs with primary GI MD Dr Dennis.       Acute pancreatitis, transaminitis. Lipase 1775 12/26. Previously normal. US no stones. MRCP normal 12/27. ALP improving 429 (648), Tbili stable ~1.5. ALT, AST stable. Previously had diffuse abdominal tenderness, this resolved on 12/29. GI following, transaminitis thought to be 2/2 cholestasis of sepsis and critical illness. Tolerating diet as of 12/29.  Cont regular diet. [facility]       ARDS 2/2 aspiration, acute hypoxic/hypercarbic respiratory failure. Initially d/t overdose, V/Q mismatch in setting of ARDS. S/p intubation (initially prone) flolan, solumedrol, diuretics, and Duobneb. Extubated 12/24. Completed steroids 12/28. VSS. Has no required supplemental oxygen since 12/29. Repeat CXR 1/2/18 with stable diffuse pulmonary opacities R>L. Ongoing dry cough, which will likely persist for weeks. No new fevers. O2 sats stable on RA. Cough overall improving to near resolved. Continue aggressive pulm toilet with acapella and IS on discharge. Cont tessalon pearls for cough.      Severe malnutrition. Prealbumin 25 12/28. Very limited PO intake, patient reports due to high volume BM. Would like to avoid [place] at all lengths. PO intake improved after long discussion about TFs on 12/29. Nutrition following and calorie counts improved. Cont regular diet. Cont to monitor po intake and [facility].       HTN. PTA on Norvasc. Currently on coreg 6.25mg BID and clonidine 0.1mg TID. BPs stable on current regimen. Cont this regimen on discharge.       Pressure injury. Not present on admission. Bilateral cheek and left forearm. WOCN following, last saw 1/2 with recs to wash BID with saline and gauze and gently rub vaseline on cheek wounds, and was left forearmwith recs to cleanse with saline solution. Continue off loading measures. Continue these wound cares on discharge.       Deconditioning. In setting of prolonged hospitalization and recent critical illness. Working with PT/OT. PT/OT currently recommending TCU. Unfortunately given patients recent severity of illness, has been difficult to place. Discharged to [facility] 1/4.       Resolved Hospital Problems and Stable, Chronic Medical Conditions:  Altered mental status. Resolved. Was having waxing and waning mental clarity, which was likely multifactorial including: rapid  cycling bipolar with psychotic features, ?drug induced psychosis, ?ICU delirium, ?ongoing infection with c.diff. Has been A&O x 3 over past 72 hours without hallucinations, agitation, or inappropriate comments. Monitor closely.       Likely acute opiate withdrawal. Signs and symptoms of opiate withdrawal noted 12/27, likely 2/2 high fentanyl dosing during intubation (chills, myalgia, diarrhea, diaphoresis, abdominal pain). Although several symptoms can be connected to other etiology (ex. Diarrhea and c diff, abdominal pain and pancreatitis). Started on scheduled opiates 12/27 with improvement, tapered off completely 12/31. No further s/sx of withdrawal. Cont to monitor for s/sx of withdrawal. Did not require any narcotics prior to discharge.       Dysuria. Patient reports dysuria, increased urinary frequency overnight 12/30. UA with WBC, +LE, neg nitrate. UCx prelim shows candida, also spoke with lab and a gram positive cocci species is growing - they then retracted this and resulted the culture as mixed urogenital mina. S/p macrobid 12/30- 1/2. Stopped macrobid once cx resulted as mixed urogenital mina. Dysuria was likely 2/2 vaginal candidiasis.       Vaginal candidiasis. Wet prep + candida. Neg for trich or BV. S/p diflucan 150mg x 1 on 1/1 and again on 1/4. No recurrence of symptoms. Monitor for recurrence of symptoms on discharge.       Hypernatremia. Elevated from admission to 12/25, normalized 12/26. Thought 2/2 hypovolemia.      ARF. 2/2 CCB overdose. Cr peak 1.62. C/b volume overload and pulmonary edema, treated with IV lasix. Cr since normalized.       SVT. Presented with HR 190s in setting of critical illness following OD, resolved with IVF.       Paroxysmal a fib. Brief episode of a fib 12/20 with conversion to NSR with metoprolol x2. No further episodes. On coreg as above.      Normocytic anemia. Hgb 10.4 on admit. Drop to 5.8 12/19, s/p 1U PRBC. Smear with marked normochromic, normocytic anemia, very  rare RBC fragments and spherocytes. Haptoglobin 63, . Concern for possible alveolar hemorrhage as above. Possibly 2/2 chronic Etoh use. Hgb now stable. Cont folic acid.       Coagulopathy. In the setting of etoh use, poor nutrition. S/p vitamin K. No s/s of acute bleeding. Normalized 12/27.      Hypothyroidism. TSH 5.06, T4 normal in setting of critical illness thus no dose adjustments made. Continue PTA synthroid. Needs repeat TFTS on 1/8/17      Incidental MRCP findings. Abnormal soft tissue posterior to the fundus of the stomach, similar to 1/2017 CT. Needs follow up as outpatient for biopsy on discharge - PCP vs GI to arrange on discharge from [facility].       GI ppx. In setting of recent OD. Cont PPI qd.       Lactic acidosis. Pt had lactic acid elev to 2.3 on 1/2 after triggering protocol for tachycardia. BC x 2 obtained, CXR obtained -stable with previous. Procalc low. Has known c.diff and resolving ARDS as above. Also patient with poor fluid intake. Repeat lactic acid today showed 0.9 after no changes were made to plan. Only check if triggers protocol.     Diet: regular  Fluids: none  DVT Prophylaxis: lovenox  Code Status: full    Disposition Plan   Expected discharge: Tomorrow; recommended to [facility] once insurance approval for [facility].   Information in the above section will display in the discharge planner report.      The patient's care was discussed with the Attending Physician, Dr. sharma, Bedside Nurse, Care Coordinator/, Patient and Patient's Family.    Negin Hyde [facility] [place]  Internal Medicine Staff Hospitalist Service  [facility]  Pager: [phone]  Please see sticky note for cross cover information    Interval History   Ana Laura is doing well today. Cough continues to improve. Had one large loose stool today. No bladder concerns. No abdominal pain. Tolerating diet. No fevers or chills. Denies SI/HI.     Data reviewed today: I reviewed all medications, new labs and  imaging results over the last 24 hours. I personally reviewed labs, imaging, and vitals.     Physical Exam   /73 (BP Location: Right arm)  Pulse 83  Temp 98.1  F (36.7  C) (Oral)  Resp 16  Wt 47.3 kg (104 lb 4.8 oz)  SpO2 95%  BMI 18.48 kg/m2     GENERAL: Alert and oriented x 3. NAD. WDWN. Resting in bed. Pleasant and cooperative.   HEENT: Anicteric sclera. MMM.   CV: RRR.   RESPIRATORY: Effort normal on RA. Lungs CTAB.  GI: Abdomen soft, nd/nt. +BS.   NEUROLOGICAL: No focal deficits. Moves all extremities.    EXTREMITIES: No peripheral edema. Intact bilateral pedal pulses.   SKIN: ecchymosis on L cheek c/d/i.

## 2018-01-05 NOTE — PLAN OF CARE
Problem: Patient Care Overview  Goal: Plan of Care/Patient Progress Review  Outcome: No Change  3078-5315: A&O, VSS on RA. Assist x1 + walker. Bed alarm on. Calls appropriately. Uses bedpan. PRN Ativan 1mg given x2 for anxiety. Advil given x1 for low back pain with relief. TL PICC SL, patent. Plan to discharge to [facility] at 1pm today pending insurance approval. HE to transport. Will continue to monitor and follow POC.

## 2018-01-05 NOTE — PLAN OF CARE
Problem: Patient Care Overview  Goal: Plan of Care/Patient Progress Review  Discharge Planner PT   Patient plan for discharge: [facility]  Current status: patient limited by fatigue, overall deconditioning this day. Sup to sit with Florinda for trunk d/t fatigue, patient with limited seated EOB tolerance d/t weakness. Engaged in supine LE strengthening exercises and ambulation in room with CGA ~20 feet using FWW.   Barriers to return to prior living situation: Level of assist, deconditioning  Recommendations for discharge: TCU   Rationale for recommendations: Patient would benefit from rehab to progress overall tolerance to activity and strength for improved functional mobility        Entered by: Praveena Villa 01/05/2018 5:08 PM

## 2018-01-05 NOTE — PROGRESS NOTES
[facility] Nurse Inpatient Adult Pressure InjuryAssessment    Followup Assessment  Reason for consultation: Evaluate and treat multiple pressure injuries    Assessment:   Left cheek wound due to Pressure Injury  Pressure Injury appears to be a deep tissue injury that is now improving. Present on admission No  This is a Medical Device Related Pressure Injury (MDRPI) due to ETT securement device    Right cheek wound due to Pressure Injury  Resolved  This is a Medical Device Related Pressure Injury (MDRPI) due to ETT securement device    Left proximal forearm wound due to Pressure Injury  Pressure Injury is unstageable  Present on admission No  This is a Medical Device Related Pressure Injury (MDRPI) due to IV hub while proning    Left distal forearm wound due to Pressure Injury  Pressure Injury is Stage 2. Present on admission No  This is a Medical Device Related Pressure Injury (MDRPI) due to IV cap.       Contributing factor of the pressure injury: immobility, proning (no longer being proned)    Photo: see individual wound assessments below    TREATMENT  PLAN    Bilateral cheek wounds: Wash twice daily with saline and gauze. Gently rub Vaseline onto wounds to keep moist.   Left forearm (at AC): wash every other day with wound cleanser and gauze, dry thoroughly. Cover with thin hydrocolloid dressing (Comfeel) that has been cut to fit.  Change every 3 days and as needed     Orders Reviewed  WO Nurse follow-up plan: weekly  Nursing to notify the Provider(s) and re-consult the [facility] Nurse if wound(s) deteriorates or new skin concern.    Patient History  According to provider note(s):    Ana Laura Wharton is a 48 yo female with hx of bipolar disorder with depression, polysubstance abuse, & prior suicide attempts, admitted to [facility] MICU  Following calcium channel blocker (CCB) overdose, tylenol overdose & alcohol overdose. Course complicated by lactic acidosis, hypoventilation, acute renal failure, & severe hypotension/vasogenic  shock. Currently intubated, sedated, & proned.      Patient proned for lung recruitment last from 17 at 1315 to 17 at 0520 (per nursing notes). On 17 ET tabs changed and pressure injury noted under old tabs. WOC consult placed. Left forearm pressure injury noted under IV during WOC skin assessment.    Objective Data     Current Diet/ Nutrition:    Active Diet Order      Regular Diet Adult      Advance Diet as Tolerated    Output:   I/O last 3 completed shifts:  In: -   Out: 800 [Urine:800]    Skin Assessment: Sujit Sujit Score  Av.6  Min: 8  Max: 20                               Labs:     Recent Labs  Lab 18  0741 18  0618   HGB  --  9.3*   INR 1.10  --    WBC  --  10.9       Physical Exam    Skin assessment:   Focused skin inspection: anterior body      Wound Location:  Left proximal forearm            Left forearm  Wound History: Noted during skin assessment. Wound was found under hub of peripheral IV site  Measurements (length x width x depth, in cm) 1.7 cm x 0.4 cm  x  0.2 cm   Wound Base:  100% Black leathery eschar  Palpation of the wound bed: induration  Periwound skin: up to 2 cm of pink erythema   Temperature: normal   Drainage: none  Description of drainage: none  Odor: none  Pain:none    Wound Location:  Left distal forearm  (see picture above)  Wound History: Noted during skin assessment. Wound was found under hub of peripheral IV site  Measurements (length x width x depth, in cm) 0.5 cm x 0.4 cm  x  0.01 cm   Wound Base: resurfaced epidermis  Palpation of the wound bed: normal   Periwound skin: intact  Color: normal and consistent with surrounding tissue  Temperature: normal   Drainage: none  Description of drainage: none  Odor: none  Pain: none      Interventions  Current support surface: Standard  Atmosair    Current off-loading measures: Pillows under calves  Visual inspection of wound(s) completed  Wound Care:was done per plan of care    Cleansing with saline  solution  Supplies: Placed at Bedside  Pt education on wound treatment plan, healing   Discussed plan of care with Nurse

## 2018-01-05 NOTE — PROGRESS NOTES
insurance requested RNCC to refer pt to [facility] in agreement with any referrals to Rehab facilities, RNCC contacted [facility] explained pt needs and course of this admission.  [address] [place] [address]  Clinical Liaison ph: [phone]   Per [facility] they have declined pt as they cannot not meet pts needs- rehab no IV, O2, or complex wounds.      Clarissa Mccormick, KISHACC, BSN    [facility]    Medicine Group  [address] [place] [address]    [email or web address]    Office: [phone] Pager: [phone]

## 2018-01-06 NOTE — PROGRESS NOTES
D: Patient ready for discharge to [facility].   I: Discharge plan  reviewed with patient.  Assisted her in packing her belongings.     A: Verbalized understanding to plans for transfer.  Patient did not have further questions or concerns.     P: Discharged via w/c with [facility] transporting at 1830.

## 2018-01-07 NOTE — PLAN OF CARE
Problem: Patient Care Overview  Goal: Plan of Care/Patient Progress Review  Physical Therapy Discharge Summary    Reason for therapy discharge:    Discharged to long term care facility.    Progress towards therapy goal(s). See goals on Care Plan in [facility] electronic health record for goal details.  Goals not met.  Barriers to achieving goals:   limited tolerance for therapy and discharge from facility.    Therapy recommendation(s):    Continued therapy is recommended.  Rationale/Recommendations:  Patient is not yet IND with mobility and continues to be below baseline. She would benefit from continued skilled PT. .

## 2018-01-08 ENCOUNTER — CARE COORDINATION (OUTPATIENT)
Dept: CARE COORDINATION | Facility: CLINIC | Age: 48
End: 2018-01-08

## 2018-01-08 LAB
BACTERIA SPEC CULT: NO GROWTH
BACTERIA SPEC CULT: NO GROWTH
SPECIMEN SOURCE: NORMAL
SPECIMEN SOURCE: NORMAL

## 2018-01-08 NOTE — PROGRESS NOTES
Patient has return visit within 24-48 hours of Discharge to Behavioral health so no post DC follow up call is needed. Ashely Khan CMA Post Discharge Team

## 2018-02-06 ENCOUNTER — TELEPHONE (OUTPATIENT)
Dept: PSYCHIATRY | Facility: CLINIC | Age: 48
End: 2018-02-06

## 2018-02-06 NOTE — TELEPHONE ENCOUNTER
----- Message from Karina Cooper sent at 2/5/2018 11:44 AM CST -----  Regarding: Canceled Appt and Refills  The patient is sick and is being intubated today with high fever and pneumonia.  Pt would like refills sent -    90 day supply of Zyprexa Vistril and Lorazepam  [facility] in [address] [place]    Jen, Katheryn Celestin RN        Phone Number: [phone]                     Caller:  lucia Du   Medication:  Lorazepam, Zyprexa,   Pharmacy and location:  [facility] in [place]   Have you contacted the pharmacy: yes   How much of medication do you have left: a cpuple days   Pending appt: 4/19   Okay to leave VM:  yes

## 2018-02-13 ENCOUNTER — APPOINTMENT (OUTPATIENT)
Dept: GENERAL RADIOLOGY | Facility: CLINIC | Age: 48
DRG: 070 | End: 2018-02-13
Attending: EMERGENCY MEDICINE
Payer: COMMERCIAL

## 2018-02-13 ENCOUNTER — HOSPITAL ENCOUNTER (EMERGENCY)
Facility: CLINIC | Age: 48
Discharge: ANOTHER HOSPITAL | End: 2018-02-13
Attending: EMERGENCY MEDICINE | Admitting: EMERGENCY MEDICINE
Payer: COMMERCIAL

## 2018-02-13 ENCOUNTER — APPOINTMENT (OUTPATIENT)
Dept: MRI IMAGING | Facility: CLINIC | Age: 48
End: 2018-02-13
Attending: EMERGENCY MEDICINE
Payer: COMMERCIAL

## 2018-02-13 ENCOUNTER — APPOINTMENT (OUTPATIENT)
Dept: CT IMAGING | Facility: CLINIC | Age: 48
End: 2018-02-13
Attending: EMERGENCY MEDICINE
Payer: COMMERCIAL

## 2018-02-13 ENCOUNTER — HOSPITAL ENCOUNTER (INPATIENT)
Facility: CLINIC | Age: 48
LOS: 1 days | Discharge: HOME | DRG: 070 | End: 2018-02-14
Attending: EMERGENCY MEDICINE | Admitting: SURGERY
Payer: COMMERCIAL

## 2018-02-13 VITALS
HEART RATE: 56 BPM | DIASTOLIC BLOOD PRESSURE: 106 MMHG | RESPIRATION RATE: 16 BRPM | SYSTOLIC BLOOD PRESSURE: 122 MMHG | OXYGEN SATURATION: 100 % | TEMPERATURE: 98.7 F

## 2018-02-13 DIAGNOSIS — F17.210 CIGARETTE SMOKER: ICD-10-CM

## 2018-02-13 DIAGNOSIS — I10 ESSENTIAL HYPERTENSION: ICD-10-CM

## 2018-02-13 DIAGNOSIS — N39.0 URINARY TRACT INFECTION WITHOUT HEMATURIA, SITE UNSPECIFIED: Primary | ICD-10-CM

## 2018-02-13 DIAGNOSIS — R09.89 LABILE HYPERTENSION: ICD-10-CM

## 2018-02-13 DIAGNOSIS — I10 ESSENTIAL HYPERTENSION, MALIGNANT: ICD-10-CM
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
## 2018-02-13 DIAGNOSIS — R51.9 NONINTRACTABLE HEADACHE, UNSPECIFIED CHRONICITY PATTERN, UNSPECIFIED HEADACHE TYPE: ICD-10-CM

## 2018-02-13 DIAGNOSIS — S09.90XA CLOSED HEAD INJURY, INITIAL ENCOUNTER: ICD-10-CM

## 2018-02-13 PROBLEM — I67.83 PRES (POSTERIOR REVERSIBLE ENCEPHALOPATHY SYNDROME): Status: ACTIVE | Noted: 2018-02-13

## 2018-02-13 LAB
ABO + RH BLD: NORMAL
ABO + RH BLD: NORMAL
ALBUMIN UR-MCNC: 10 MG/DL
AMPHETAMINES UR QL SCN: NEGATIVE
ANION GAP SERPL CALCULATED.3IONS-SCNC: 7 MMOL/L (ref 3–14)
APPEARANCE UR: ABNORMAL
BACTERIA #/AREA URNS HPF: ABNORMAL /HPF
BARBITURATES UR QL: NEGATIVE
BASOPHILS # BLD AUTO: 0.1 10E9/L (ref 0–0.2)
BASOPHILS NFR BLD AUTO: 0.5 %
BENZODIAZ UR QL: NEGATIVE
BILIRUB UR QL STRIP: NEGATIVE
BLD GP AB SCN SERPL QL: NORMAL
BLOOD BANK CMNT PATIENT-IMP: NORMAL
BUN SERPL-MCNC: 14 MG/DL (ref 7–30)
CALCIUM SERPL-MCNC: 8.9 MG/DL (ref 8.5–10.1)
CANNABINOIDS UR QL SCN: NEGATIVE
CHLORIDE SERPL-SCNC: 100 MMOL/L (ref 94–109)
CO2 SERPL-SCNC: 28 MMOL/L (ref 20–32)
COCAINE UR QL: NEGATIVE
COLOR UR AUTO: YELLOW
CREAT SERPL-MCNC: 0.52 MG/DL (ref 0.52–1.04)
DIFFERENTIAL METHOD BLD: ABNORMAL
EOSINOPHIL # BLD AUTO: 0.5 10E9/L (ref 0–0.7)
EOSINOPHIL NFR BLD AUTO: 3.3 %
ERYTHROCYTE [DISTWIDTH] IN BLOOD BY AUTOMATED COUNT: 13.5 % (ref 10–15)
ETHANOL SERPL-MCNC: <0.01 G/DL
ETHANOL UR QL SCN: NEGATIVE
GFR SERPL CREATININE-BSD FRML MDRD: >90 ML/MIN/1.7M2
GLUCOSE SERPL-MCNC: 108 MG/DL (ref 70–99)
GLUCOSE UR STRIP-MCNC: NEGATIVE MG/DL
HCG SERPL QL: NEGATIVE
HCT VFR BLD AUTO: 45.9 % (ref 35–47)
HGB BLD-MCNC: 14.4 G/DL (ref 11.7–15.7)
HGB BLD-MCNC: 15.4 G/DL (ref 11.7–15.7)
HGB UR QL STRIP: NEGATIVE
IMM GRANULOCYTES # BLD: 0.1 10E9/L (ref 0–0.4)
IMM GRANULOCYTES NFR BLD: 0.7 %
INR PPP: 1.04 (ref 0.86–1.14)
INTERPRETATION ECG - MUSE: NORMAL
KETONES UR STRIP-MCNC: NEGATIVE MG/DL
LACTATE BLD-SCNC: 2.3 MMOL/L (ref 0.4–1.9)
LACTATE BLD-SCNC: 2.4 MMOL/L (ref 0.7–2)
LEUKOCYTE ESTERASE UR QL STRIP: ABNORMAL
LYMPHOCYTES # BLD AUTO: 3.9 10E9/L (ref 0.8–5.3)
LYMPHOCYTES NFR BLD AUTO: 24.2 %
MCH RBC QN AUTO: 32.4 PG (ref 26.5–33)
MCHC RBC AUTO-ENTMCNC: 33.6 G/DL (ref 31.5–36.5)
MCV RBC AUTO: 96 FL (ref 78–100)
MONOCYTES # BLD AUTO: 1.8 10E9/L (ref 0–1.3)
MONOCYTES NFR BLD AUTO: 11.1 %
MUCOUS THREADS #/AREA URNS LPF: PRESENT /LPF
NEUTROPHILS # BLD AUTO: 9.7 10E9/L (ref 1.6–8.3)
NEUTROPHILS NFR BLD AUTO: 60.2 %
NITRATE UR QL: NEGATIVE
NRBC # BLD AUTO: 0 10*3/UL
NRBC BLD AUTO-RTO: 0 /100
OPIATES UR QL SCN: POSITIVE
PH UR STRIP: 6.5 PH (ref 5–7)
PLATELET # BLD AUTO: 324 10E9/L (ref 150–450)
POTASSIUM SERPL-SCNC: 3.6 MMOL/L (ref 3.4–5.3)
RBC # BLD AUTO: 4.76 10E12/L (ref 3.8–5.2)
RBC #/AREA URNS AUTO: 4 /HPF (ref 0–2)
SODIUM SERPL-SCNC: 135 MMOL/L (ref 133–144)
SOURCE: ABNORMAL
SP GR UR STRIP: 1.01 (ref 1–1.03)
SPECIMEN EXP DATE BLD: NORMAL
SQUAMOUS #/AREA URNS AUTO: 19 /HPF (ref 0–1)
TROPONIN I SERPL-MCNC: <0.015 UG/L (ref 0–0.04)
UROBILINOGEN UR STRIP-MCNC: NORMAL MG/DL (ref 0–2)
WBC # BLD AUTO: 16.1 10E9/L (ref 4–11)
WBC #/AREA URNS AUTO: 11 /HPF (ref 0–2)

## 2018-02-13 PROCEDURE — 87040 BLOOD CULTURE FOR BACTERIA: CPT | Performed by: SURGERY

## 2018-02-13 PROCEDURE — A9585 GADOBUTROL INJECTION: HCPCS | Performed by: RADIOLOGY

## 2018-02-13 PROCEDURE — 99285 EMERGENCY DEPT VISIT HI MDM: CPT | Mod: 25

## 2018-02-13 PROCEDURE — 25000125 ZZHC RX 250: Performed by: SURGERY

## 2018-02-13 PROCEDURE — 83605 ASSAY OF LACTIC ACID: CPT | Performed by: SURGERY

## 2018-02-13 PROCEDURE — 85610 PROTHROMBIN TIME: CPT | Performed by: EMERGENCY MEDICINE

## 2018-02-13 PROCEDURE — 25000128 H RX IP 250 OP 636: Performed by: EMERGENCY MEDICINE

## 2018-02-13 PROCEDURE — 25000132 ZZH RX MED GY IP 250 OP 250 PS 637: Performed by: NURSE PRACTITIONER

## 2018-02-13 PROCEDURE — 70553 MRI BRAIN STEM W/O & W/DYE: CPT

## 2018-02-13 PROCEDURE — 99285 EMERGENCY DEPT VISIT HI MDM: CPT | Mod: 25 | Performed by: EMERGENCY MEDICINE

## 2018-02-13 PROCEDURE — 25000132 ZZH RX MED GY IP 250 OP 250 PS 637: Performed by: SURGERY

## 2018-02-13 PROCEDURE — 25800025 ZZH RX 258: Performed by: EMERGENCY MEDICINE

## 2018-02-13 PROCEDURE — 96366 THER/PROPH/DIAG IV INF ADDON: CPT | Performed by: EMERGENCY MEDICINE

## 2018-02-13 PROCEDURE — 84703 CHORIONIC GONADOTROPIN ASSAY: CPT | Performed by: EMERGENCY MEDICINE

## 2018-02-13 PROCEDURE — 87088 URINE BACTERIA CULTURE: CPT | Performed by: EMERGENCY MEDICINE

## 2018-02-13 PROCEDURE — 93005 ELECTROCARDIOGRAM TRACING: CPT

## 2018-02-13 PROCEDURE — 86850 RBC ANTIBODY SCREEN: CPT | Performed by: EMERGENCY MEDICINE

## 2018-02-13 PROCEDURE — 87186 SC STD MICRODIL/AGAR DIL: CPT | Performed by: EMERGENCY MEDICINE

## 2018-02-13 PROCEDURE — 81001 URINALYSIS AUTO W/SCOPE: CPT | Performed by: EMERGENCY MEDICINE

## 2018-02-13 PROCEDURE — 12000008 ZZH R&B INTERMEDIATE UMMC

## 2018-02-13 PROCEDURE — 96375 TX/PRO/DX INJ NEW DRUG ADDON: CPT

## 2018-02-13 PROCEDURE — 80307 DRUG TEST PRSMV CHEM ANLYZR: CPT | Performed by: EMERGENCY MEDICINE

## 2018-02-13 PROCEDURE — 96365 THER/PROPH/DIAG IV INF INIT: CPT | Mod: 59

## 2018-02-13 PROCEDURE — 25000125 ZZHC RX 250: Performed by: EMERGENCY MEDICINE

## 2018-02-13 PROCEDURE — 80320 DRUG SCREEN QUANTALCOHOLS: CPT | Performed by: EMERGENCY MEDICINE

## 2018-02-13 PROCEDURE — 71045 X-RAY EXAM CHEST 1 VIEW: CPT

## 2018-02-13 PROCEDURE — 25000128 H RX IP 250 OP 636: Performed by: SURGERY

## 2018-02-13 PROCEDURE — 84484 ASSAY OF TROPONIN QUANT: CPT | Performed by: EMERGENCY MEDICINE

## 2018-02-13 PROCEDURE — 40000141 ZZH STATISTIC PERIPHERAL IV START W/O US GUIDANCE

## 2018-02-13 PROCEDURE — 85018 HEMOGLOBIN: CPT | Performed by: EMERGENCY MEDICINE

## 2018-02-13 PROCEDURE — 86901 BLOOD TYPING SEROLOGIC RH(D): CPT | Performed by: EMERGENCY MEDICINE

## 2018-02-13 PROCEDURE — 80048 BASIC METABOLIC PNL TOTAL CA: CPT | Performed by: EMERGENCY MEDICINE

## 2018-02-13 PROCEDURE — 96365 THER/PROPH/DIAG IV INF INIT: CPT | Performed by: EMERGENCY MEDICINE

## 2018-02-13 PROCEDURE — 25000128 H RX IP 250 OP 636: Performed by: NURSE PRACTITIONER

## 2018-02-13 PROCEDURE — 36415 COLL VENOUS BLD VENIPUNCTURE: CPT | Performed by: SURGERY

## 2018-02-13 PROCEDURE — 85025 COMPLETE CBC W/AUTO DIFF WBC: CPT | Performed by: EMERGENCY MEDICINE

## 2018-02-13 PROCEDURE — 86900 BLOOD TYPING SEROLOGIC ABO: CPT | Performed by: EMERGENCY MEDICINE

## 2018-02-13 PROCEDURE — 25000132 ZZH RX MED GY IP 250 OP 250 PS 637: Performed by: EMERGENCY MEDICINE

## 2018-02-13 PROCEDURE — 87086 URINE CULTURE/COLONY COUNT: CPT | Performed by: EMERGENCY MEDICINE

## 2018-02-13 PROCEDURE — 25000128 H RX IP 250 OP 636: Performed by: RADIOLOGY

## 2018-02-13 PROCEDURE — 70450 CT HEAD/BRAIN W/O DYE: CPT

## 2018-02-13 RX ORDER — NITROFURANTOIN 25; 75 MG/1; MG/1
100 CAPSULE ORAL EVERY 12 HOURS SCHEDULED
Status: DISCONTINUED | OUTPATIENT
Start: 2018-02-13 | End: 2018-02-14 | Stop reason: HOSPADM

## 2018-02-13 RX ORDER — PREDNISONE 10 MG/1
TABLET ORAL DAILY
COMMUNITY
End: 2018-06-12

## 2018-02-13 RX ORDER — CIPROFLOXACIN 250 MG/1
125 TABLET, FILM COATED ORAL DAILY
COMMUNITY
End: 2018-10-01

## 2018-02-13 RX ORDER — PREDNISONE 10 MG/1
10 TABLET ORAL DAILY
Status: DISCONTINUED | OUTPATIENT
Start: 2018-02-15 | End: 2018-02-14 | Stop reason: HOSPADM

## 2018-02-13 RX ORDER — GADOBUTROL 604.72 MG/ML
7.5 INJECTION INTRAVENOUS ONCE
Status: COMPLETED | OUTPATIENT
Start: 2018-02-13 | End: 2018-02-13

## 2018-02-13 RX ORDER — FOLIC ACID 1 MG/1
1 TABLET ORAL DAILY
Status: DISCONTINUED | OUTPATIENT
Start: 2018-02-13 | End: 2018-02-14 | Stop reason: HOSPADM

## 2018-02-13 RX ORDER — ONDANSETRON 2 MG/ML
4 INJECTION INTRAMUSCULAR; INTRAVENOUS EVERY 6 HOURS PRN
Status: DISCONTINUED | OUTPATIENT
Start: 2018-02-13 | End: 2018-02-14 | Stop reason: HOSPADM

## 2018-02-13 RX ORDER — HYDROMORPHONE HYDROCHLORIDE 1 MG/ML
0.5 INJECTION, SOLUTION INTRAMUSCULAR; INTRAVENOUS; SUBCUTANEOUS ONCE
Status: COMPLETED | OUTPATIENT
Start: 2018-02-13 | End: 2018-02-13

## 2018-02-13 RX ORDER — METOCLOPRAMIDE 5 MG/1
10 TABLET ORAL EVERY 6 HOURS PRN
Status: DISCONTINUED | OUTPATIENT
Start: 2018-02-13 | End: 2018-02-14 | Stop reason: HOSPADM

## 2018-02-13 RX ORDER — POTASSIUM CHLORIDE 750 MG/1
20-40 TABLET, EXTENDED RELEASE ORAL
Status: DISCONTINUED | OUTPATIENT
Start: 2018-02-13 | End: 2018-02-14 | Stop reason: HOSPADM

## 2018-02-13 RX ORDER — CLONIDINE HYDROCHLORIDE 0.1 MG/1
0.1 TABLET ORAL 2 TIMES DAILY
Status: DISCONTINUED | OUTPATIENT
Start: 2018-02-13 | End: 2018-02-14 | Stop reason: HOSPADM

## 2018-02-13 RX ORDER — CLONIDINE HYDROCHLORIDE 0.1 MG/1
0.1 TABLET ORAL ONCE
Status: DISCONTINUED | OUTPATIENT
Start: 2018-02-13 | End: 2018-02-13 | Stop reason: HOSPADM

## 2018-02-13 RX ORDER — CARVEDILOL 3.12 MG/1
6.25 TABLET ORAL 2 TIMES DAILY
Status: DISCONTINUED | OUTPATIENT
Start: 2018-02-13 | End: 2018-02-14 | Stop reason: HOSPADM

## 2018-02-13 RX ORDER — MAGNESIUM SULFATE HEPTAHYDRATE 40 MG/ML
4 INJECTION, SOLUTION INTRAVENOUS EVERY 4 HOURS PRN
Status: DISCONTINUED | OUTPATIENT
Start: 2018-02-13 | End: 2018-02-14 | Stop reason: HOSPADM

## 2018-02-13 RX ORDER — HYDRALAZINE HYDROCHLORIDE 20 MG/ML
10 INJECTION INTRAMUSCULAR; INTRAVENOUS EVERY 6 HOURS PRN
Status: DISCONTINUED | OUTPATIENT
Start: 2018-02-13 | End: 2018-02-14 | Stop reason: HOSPADM

## 2018-02-13 RX ORDER — POTASSIUM CL/LIDO/0.9 % NACL 10MEQ/0.1L
10 INTRAVENOUS SOLUTION, PIGGYBACK (ML) INTRAVENOUS
Status: DISCONTINUED | OUTPATIENT
Start: 2018-02-13 | End: 2018-02-14 | Stop reason: HOSPADM

## 2018-02-13 RX ORDER — LEVOTHYROXINE SODIUM 25 UG/1
50 TABLET ORAL DAILY
Status: DISCONTINUED | OUTPATIENT
Start: 2018-02-13 | End: 2018-02-14 | Stop reason: HOSPADM

## 2018-02-13 RX ORDER — NITROGLYCERIN 20 MG/100ML
.07-2 INJECTION INTRAVENOUS CONTINUOUS
Status: DISCONTINUED | OUTPATIENT
Start: 2018-02-13 | End: 2018-02-13

## 2018-02-13 RX ORDER — DEXTROSE MONOHYDRATE, SODIUM CHLORIDE, AND POTASSIUM CHLORIDE 50; 1.49; 4.5 G/1000ML; G/1000ML; G/1000ML
INJECTION, SOLUTION INTRAVENOUS CONTINUOUS
Status: DISCONTINUED | OUTPATIENT
Start: 2018-02-13 | End: 2018-02-13

## 2018-02-13 RX ORDER — HYDRALAZINE HYDROCHLORIDE 20 MG/ML
10 INJECTION INTRAMUSCULAR; INTRAVENOUS ONCE
Status: COMPLETED | OUTPATIENT
Start: 2018-02-13 | End: 2018-02-13

## 2018-02-13 RX ORDER — HYDROMORPHONE HCL/0.9% NACL/PF 0.2MG/0.2
0.2 SYRINGE (ML) INTRAVENOUS
Status: DISCONTINUED | OUTPATIENT
Start: 2018-02-13 | End: 2018-02-14

## 2018-02-13 RX ORDER — PANTOPRAZOLE SODIUM 40 MG/1
40 TABLET, DELAYED RELEASE ORAL
Status: DISCONTINUED | OUTPATIENT
Start: 2018-02-14 | End: 2018-02-14 | Stop reason: HOSPADM

## 2018-02-13 RX ORDER — NALOXONE HYDROCHLORIDE 0.4 MG/ML
.1-.4 INJECTION, SOLUTION INTRAMUSCULAR; INTRAVENOUS; SUBCUTANEOUS
Status: DISCONTINUED | OUTPATIENT
Start: 2018-02-13 | End: 2018-02-14 | Stop reason: HOSPADM

## 2018-02-13 RX ORDER — PROCHLORPERAZINE MALEATE 5 MG
10 TABLET ORAL EVERY 6 HOURS PRN
Status: DISCONTINUED | OUTPATIENT
Start: 2018-02-13 | End: 2018-02-13

## 2018-02-13 RX ORDER — PREDNISONE 20 MG/1
20 TABLET ORAL DAILY
Status: COMPLETED | OUTPATIENT
Start: 2018-02-13 | End: 2018-02-14

## 2018-02-13 RX ORDER — AMOXICILLIN 250 MG
1-2 CAPSULE ORAL
Status: DISCONTINUED | OUTPATIENT
Start: 2018-02-13 | End: 2018-02-14 | Stop reason: HOSPADM

## 2018-02-13 RX ORDER — POTASSIUM CHLORIDE 7.45 MG/ML
10 INJECTION INTRAVENOUS
Status: DISCONTINUED | OUTPATIENT
Start: 2018-02-13 | End: 2018-02-14 | Stop reason: HOSPADM

## 2018-02-13 RX ORDER — LABETALOL HYDROCHLORIDE 5 MG/ML
20 INJECTION, SOLUTION INTRAVENOUS EVERY 4 HOURS PRN
Status: DISCONTINUED | OUTPATIENT
Start: 2018-02-13 | End: 2018-02-14 | Stop reason: HOSPADM

## 2018-02-13 RX ORDER — ACETAMINOPHEN 325 MG/1
650 TABLET ORAL EVERY 4 HOURS PRN
Status: DISCONTINUED | OUTPATIENT
Start: 2018-02-13 | End: 2018-02-14

## 2018-02-13 RX ORDER — ACETAMINOPHEN 325 MG/1
650 TABLET ORAL EVERY 4 HOURS PRN
Status: DISCONTINUED | OUTPATIENT
Start: 2018-02-13 | End: 2018-02-13

## 2018-02-13 RX ORDER — ONDANSETRON 4 MG/1
4 TABLET, ORALLY DISINTEGRATING ORAL EVERY 6 HOURS PRN
Status: DISCONTINUED | OUTPATIENT
Start: 2018-02-13 | End: 2018-02-14 | Stop reason: HOSPADM

## 2018-02-13 RX ORDER — CLONAZEPAM 1 MG/1
1 TABLET ORAL 2 TIMES DAILY PRN
Status: DISCONTINUED | OUTPATIENT
Start: 2018-02-13 | End: 2018-02-14 | Stop reason: HOSPADM

## 2018-02-13 RX ORDER — HYDROXYZINE HYDROCHLORIDE 50 MG/1
50 TABLET, FILM COATED ORAL 2 TIMES DAILY PRN
Status: DISCONTINUED | OUTPATIENT
Start: 2018-02-13 | End: 2018-02-14 | Stop reason: HOSPADM

## 2018-02-13 RX ORDER — POTASSIUM CHLORIDE 29.8 MG/ML
20 INJECTION INTRAVENOUS
Status: DISCONTINUED | OUTPATIENT
Start: 2018-02-13 | End: 2018-02-14 | Stop reason: HOSPADM

## 2018-02-13 RX ORDER — ONDANSETRON 2 MG/ML
4 INJECTION INTRAMUSCULAR; INTRAVENOUS EVERY 30 MIN PRN
Status: DISCONTINUED | OUTPATIENT
Start: 2018-02-13 | End: 2018-02-13

## 2018-02-13 RX ORDER — ONDANSETRON 2 MG/ML
4 INJECTION INTRAMUSCULAR; INTRAVENOUS ONCE
Status: COMPLETED | OUTPATIENT
Start: 2018-02-13 | End: 2018-02-13

## 2018-02-13 RX ORDER — CLONIDINE HYDROCHLORIDE 0.1 MG/1
0.1 TABLET ORAL 2 TIMES DAILY
COMMUNITY
End: 2019-01-09

## 2018-02-13 RX ORDER — OLANZAPINE 5 MG/1
5 TABLET ORAL 2 TIMES DAILY
Status: DISCONTINUED | OUTPATIENT
Start: 2018-02-13 | End: 2018-02-14 | Stop reason: HOSPADM

## 2018-02-13 RX ORDER — METOCLOPRAMIDE HYDROCHLORIDE 5 MG/ML
10 INJECTION INTRAMUSCULAR; INTRAVENOUS EVERY 6 HOURS PRN
Status: DISCONTINUED | OUTPATIENT
Start: 2018-02-13 | End: 2018-02-14 | Stop reason: HOSPADM

## 2018-02-13 RX ORDER — PROCHLORPERAZINE 25 MG
25 SUPPOSITORY, RECTAL RECTAL EVERY 12 HOURS PRN
Status: DISCONTINUED | OUTPATIENT
Start: 2018-02-13 | End: 2018-02-13

## 2018-02-13 RX ORDER — POTASSIUM CHLORIDE 1.5 G/1.58G
20-40 POWDER, FOR SOLUTION ORAL
Status: DISCONTINUED | OUTPATIENT
Start: 2018-02-13 | End: 2018-02-14 | Stop reason: HOSPADM

## 2018-02-13 RX ADMIN — ACETAMINOPHEN 650 MG: 325 TABLET, FILM COATED ORAL at 16:05

## 2018-02-13 RX ADMIN — NICARDIPINE HYDROCHLORIDE 2.5 MG/HR: 0.2 INJECTION, SOLUTION INTRAVENOUS at 06:19

## 2018-02-13 RX ADMIN — NICARDIPINE HYDROCHLORIDE 2.5 MG/HR: 0.2 INJECTION, SOLUTION INTRAVENOUS at 09:30

## 2018-02-13 RX ADMIN — CLONIDINE HYDROCHLORIDE 0.1 MG: 0.1 TABLET ORAL at 21:38

## 2018-02-13 RX ADMIN — Medication 0.2 MG: at 23:14

## 2018-02-13 RX ADMIN — PREDNISONE 20 MG: 20 TABLET ORAL at 21:38

## 2018-02-13 RX ADMIN — LEVOTHYROXINE SODIUM 50 MCG: 25 TABLET ORAL at 19:00

## 2018-02-13 RX ADMIN — Medication 0.5 MG: at 06:15

## 2018-02-13 RX ADMIN — FOLIC ACID 1 MG: 1 TABLET ORAL at 19:00

## 2018-02-13 RX ADMIN — CARVEDILOL 6.25 MG: 3.12 TABLET, FILM COATED ORAL at 21:39

## 2018-02-13 RX ADMIN — HYDRALAZINE HYDROCHLORIDE 10 MG: 20 INJECTION INTRAMUSCULAR; INTRAVENOUS at 05:55

## 2018-02-13 RX ADMIN — NITROFURANTOIN (MONOHYDRATE/MACROCRYSTALS) 100 MG: 75; 25 CAPSULE ORAL at 20:37

## 2018-02-13 RX ADMIN — OLANZAPINE 5 MG: 5 TABLET, FILM COATED ORAL at 20:37

## 2018-02-13 RX ADMIN — SODIUM CHLORIDE, POTASSIUM CHLORIDE, SODIUM LACTATE AND CALCIUM CHLORIDE 1000 ML: 600; 310; 30; 20 INJECTION, SOLUTION INTRAVENOUS at 19:11

## 2018-02-13 RX ADMIN — POTASSIUM CHLORIDE, DEXTROSE MONOHYDRATE AND SODIUM CHLORIDE: 150; 5; 450 INJECTION, SOLUTION INTRAVENOUS at 12:35

## 2018-02-13 RX ADMIN — GADOBUTROL 5 ML: 604.72 INJECTION INTRAVENOUS at 07:50

## 2018-02-13 RX ADMIN — Medication 0.2 MG: at 20:36

## 2018-02-13 RX ADMIN — ONDANSETRON 4 MG: 2 INJECTION INTRAMUSCULAR; INTRAVENOUS at 06:15

## 2018-02-13 ASSESSMENT — ENCOUNTER SYMPTOMS
NUMBNESS: 0
WEAKNESS: 0
WEAKNESS: 0
CONFUSION: 1
NAUSEA: 0
HEADACHES: 1
HEADACHES: 1
NUMBNESS: 0
VOMITING: 0
NAUSEA: 1
PHOTOPHOBIA: 1

## 2018-02-13 ASSESSMENT — VISUAL ACUITY: OU: NORMAL ACUITY

## 2018-02-13 NOTE — ED NOTES
"[facility]  ED Nurse Handoff Report    Ana Laura Wharton is a 47 year old female   ED Chief complaint: Headache  . ED Diagnosis:   Final diagnoses:   Nonintractable headache, unspecified chronicity pattern, unspecified headache type   Essential hypertension   Posterior reversible encephalopathy syndrome (PRES)     Allergies:   Allergies   Allergen Reactions     Amoxicillin-Pot Clavulanate      PN: LW Reaction: Itching, Pruritis     Azithromycin      Other reaction(s): Dermatitis     Cephalexin      PN: LW Reaction: Itching, Pruritis     Ciprofloxacin Other (See Comments)     Joint pain cdiff     Compazine [Prochlorperazine] Other (See Comments)     dystonia     Metoclopramide Other (See Comments)     \"I feel like I am crawling out of my skin\"     Minocycline Nausea and Vomiting     PN: DIARRHEA, VOMITING, AND STOMACH CRAMPS     Penicillins Itching     Reglan [Metoclopramide Hcl] Other (See Comments)     Sensation of \"crawling out of skin\"     Restoril [Temazepam]      Thorazine [Chlorpromazine] Other (See Comments)     dystonia     Abilify [Aripiprazole] Rash     Lamotrigine Rash     Severe drug rash - contraindication to receiving again. Skin peeling.      Sulfa Drugs Rash       Code Status: Full Code  Activity level - Baseline/Home:  Independent. Activity Level - Current:   Stand with Assist. Lift room needed: No. Bariatric: No   Needed: No   Isolation: No. Infection: Not Applicable.     Vital Signs:   Vitals:    02/13/18 0628 02/13/18 0629 02/13/18 0630 02/13/18 0631   BP:   115/71 104/68   Pulse:       Resp:       Temp:       TempSrc:       SpO2: 100% 100% 100%        Cardiac Rhythm:  ,      Pain level:  0  Patient confused: No. Patient Falls Risk: Yes.   Elimination Status: no   Patient Report - Initial Complaint: h/a. Focused Assessment: h/a   Tests Performed: labscan. Abnormal Results:   Labs Ordered and Resulted from Time of ED Arrival Up to the Time of Departure from the ED   CBC WITH " PLATELETS DIFFERENTIAL - Abnormal; Notable for the following:        Result Value    WBC 16.1 (*)     Absolute Neutrophil 9.7 (*)     Absolute Monocytes 1.8 (*)     All other components within normal limits   BASIC METABOLIC PANEL   TROPONIN I     .   Treatments provided: below  Family Comments: pt updated via cell  phone  OBS brochure/video discussed/provided to patient:  N/A  ED Medications:   Medications   niCARdipine 40 mg in 200 mL 0.9% NaCl (CARDENE) infusion (0 mg/hr Intravenous Stopped 2/13/18 0635)   hydrALAZINE (APRESOLINE) injection 10 mg (10 mg Intravenous Given 2/13/18 0555)   ondansetron (ZOFRAN) injection 4 mg (4 mg Intravenous Given 2/13/18 0615)   HYDROmorphone (PF) (DILAUDID) injection 0.5 mg (0.5 mg Intravenous Given 2/13/18 0615)     Drips infusing:  No  For the majority of the shift, the patient's behavior Green. Interventions performed were n.     Severe Sepsis OR Septic Shock Diagnosis Present: No      ED Nurse Name/Phone Number: Tylor Carter,   6:42 AM

## 2018-02-13 NOTE — ED NOTES
Pt coming out of room talking loudly that she needs pain medications for her head. Pt walked back to the bed and got in I advised her that we were not going to give her any meds until the CT of her head was done. She wanted to know why she needed a ct. I advised her that we need to find out if there is any bleeding going on in her head. She then was complaining that she wanted water. I told her not until after the ct d/t not knowing if she has a bleed.

## 2018-02-13 NOTE — ED NOTES
"[facility]   ED Nurse to Floor Handoff     Ana Laura Wharton is a 47 year old female who speaks English and lives with a spouse,  in a home  They arrived in the ED by ambulance from emergency room    ED Chief Complaint: Fall and Hypertension    ED Dx;   Final diagnoses:   Closed head injury, initial encounter - with cerebral edema and microhemorrhages - r/o PRES   Labile hypertension         Needed?: No    Allergies:   Allergies   Allergen Reactions     Amoxicillin-Pot Clavulanate      PN: LW Reaction: Itching, Pruritis     Azithromycin      Other reaction(s): Dermatitis     Cephalexin      PN: LW Reaction: Itching, Pruritis     Ciprofloxacin Other (See Comments)     Joint pain cdiff     Compazine [Prochlorperazine] Other (See Comments)     dystonia     Metoclopramide Other (See Comments)     \"I feel like I am crawling out of my skin\"     Minocycline Nausea and Vomiting     PN: DIARRHEA, VOMITING, AND STOMACH CRAMPS     Penicillins Itching     Reglan [Metoclopramide Hcl] Other (See Comments)     Sensation of \"crawling out of skin\"     Restoril [Temazepam]      Thorazine [Chlorpromazine] Other (See Comments)     dystonia     Abilify [Aripiprazole] Rash     Lamotrigine Rash     Severe drug rash - contraindication to receiving again. Skin peeling.      Sulfa Drugs Rash   .  Past Medical Hx:   Past Medical History:   Diagnosis Date     Anxiety      Bipolar disorder (H)      C. difficile colitis      Depressive disorder      Essential hypertension 7/8/2015     Gastro-oesophageal reflux disease      Hypothyroidism 4/1/2015     Migraine      Spontaneous pneumothorax     x3, resolved w/ pleurodesis      Suicide attempt       Baseline Mental status: WDL  Current Mental Status changes:Confused and difficulty preforming some basic tasks    Infection: No  Sepsis suspected: No  Isolation type: No active isolations     Activity level - Baseline/Home:  Independent  Activity Level - " Current:   Stand with Assist    Bariatric equipment needed?: No    In the ED these meds were given:   Medications   nitroGLYcerin 50 mg in D5W 250 mL (adult std) infusion (0 mcg/kg/min × 52.2 kg Intravenous Hold 2/13/18 1236)   dextrose 5% and 0.45% NaCl + KCl 20 mEq/L infusion ( Intravenous Rate/Dose Verify 2/13/18 1600)   ondansetron (ZOFRAN) injection 4 mg (not administered)       Drips running?  Yes    Home pump or pre-existing LDA's present? No    Labs results:   Labs Ordered and Resulted from Time of ED Arrival Up to the Time of Departure from the ED   DRUG ABUSE SCREEN 6 CHEM DEP URINE ([facility]) - Abnormal; Notable for the following:        Result Value    Opiates Qualitative Urine Positive (*)     All other components within normal limits   ROUTINE UA WITH MICROSCOPIC REFLEX TO CULTURE - Abnormal; Notable for the following:     Protein Albumin Urine 10 (*)     Leukocyte Esterase Urine Small (*)     WBC Urine 11 (*)     RBC Urine 4 (*)     Bacteria Urine Few (*)     Squamous Epithelial /HPF Urine 19 (*)     Mucous Urine Present (*)     All other components within normal limits   ALCOHOL ETHYL   HEMOGLOBIN   INR   ABO/RH TYPE AND SCREEN   URINE CULTURE AEROBIC BACTERIAL       Imaging Studies:   Recent Results (from the past 24 hour(s))   Head CT w/o contrast    Narrative    CT HEAD WITHOUT CONTRAST  2/13/2018 5:02 AM     HISTORY: Trauma. Headache.    COMPARISON: 12/20/2017.    TECHNIQUE: Without intravenous contrast, helical sections were  acquired through the brain. Coronal reconstructions were generated.  Radiation dose for this scan was reduced using automated exposure  control, adjustment of the mA and/or kV according to the patient's  size, or iterative reconstruction technique.    FINDINGS: White matter low attenuation within the subcortical regions  of bilateral occipital and posterior parietal lobes, left more  prominent than right (for example, series 2 image 16), new since  12/20/2017. No intra-axial  mass, mass effect or midline shift. Normal  gray-white matter differentiation. No visualized acute intra-axial  hemorrhage. The cerebral ventricles are normal in caliber. The basal  cisterns are patent. No extra-axial fluid collection. The visualized  portions of the paranasal sinuses and mastoid air cells are  unremarkable A small contusion is present in the scalp overlying the  right frontal bone.      Impression    IMPRESSION:  1. White matter low attenuation within bilateral occipital and  posterior parietal lobes, new since 12/20/2017. This is nonspecific  and could represent edema related to trauma, but posterior reversible  leukoencephalopathy could have a similar appearance. If clinically  relevant, an MR of the brain could be considered for further  evaluation.  2. No other findings suspicious for acute intracranial abnormality.  3. Small contusion in the scalp overlying the right frontal bone.    The findings were called to Dr. Feliz by Dr. Lombardo on 2/13/2018 at  0540 hours.    ANNE MARIE LOMBARDO MD   MR Brain w/o & w Contrast    Narrative    MRI BRAIN WITHOUT AND WITH CONTRAST  2/13/2018 7:58 AM    HISTORY:  Bilateral occipital edema on CT. Evaluate for PRES.   Headache after falling last night. Severe hypertension on arrival to  the emergency department. Patient on Lovenox.    TECHNIQUE:  Multiplanar, multisequence MRI of the brain without and  with 5 mL Gadavist.    COMPARISON: CT head today.    FINDINGS: T2 FLAIR images demonstrate multiple foci of prolonged T2  relaxation in the white matter and minimally in the overlying cortex  of both occipital poles, in the white matter more superiorly in the  occipital and parietal lobes bilaterally and fairly symmetrically, in  the splenium and posterior body of the corpus callosum and in the  anterior right thalamus, left corona radiata and upper left putamen.  Other small foci of prolonged T2 relaxation are seen in the right side  of the [place] and left  cerebellar hemisphere posteriorly and centrally  as well as in the central white matter of the left anterior temporal  lobe. Gradient echo images demonstrate microhemorrhages in the left  side of the posterior body of the corpus callosum and in the left  occipital lobe abnormality, but no focal hematoma is seen. Right  lateral frontal and anterior parietal scalp edema is noted from the  recent trauma.    The facial structures appear normal. The arteries at the base of the  brain and the dural venous sinuses appear patent.       Impression    IMPRESSION:    1. Extensive foci of edema in the brain mainly posteriorly but also  involving central structures, left cerebellar hemisphere and left  temporal lobe white matter and portions of the basal ganglia as  described above. The distribution is typical of posterior reversible  encephalopathy syndrome, particularly in light of the patient's  episode of severe hypertension.  2. Microhemorrhages within the left occipital pole and in the left  side of the posterior body of the left corpus callosum are most likely  related to the same posterior reversible encephalopathy syndrome, but  in light of the right frontal trauma, the possibility of hemorrhagic  contusions should also be considered as an etiology for the  microhemorrhages. Followup MRI in a few days is suggested to be sure  the areas of edema are resolving.    MARY CERNA MD   Chest  XR, 1 view portable    Narrative    XR CHEST PORT 1 VW  2/13/2018 12:30 PM      HISTORY: head injury;      COMPARISON: Chest radiograph 1/2/2018    FINDINGS: Normal cardiac mediastinal silhouette and pulmonary  vasculature. Improvement in bilateral lung opacities Resolution of  previously seen bilateral pulmonary opacities. No substantial pleural  effusion. No pneumothorax. Left apical pleural thickening, similar to  prior exam.  The visualized upper abdomen is unremarkable.      Impression    IMPRESSION:   1. Improvement in patchy  lung opacities.  2. Continued bilateral pleural thickening.    I have personally reviewed the examination and initial interpretation  and I agree with the findings.    ESTEE ARCINIEGA MD       Recent vital signs:   /88  Resp 16  Wt 52.2 kg (115 lb)  LMP 01/13/2018  SpO2 100%  BMI 20.37 kg/m2    Cardiac Rhythm: Normal Sinus  Pt needs tele? No  Skin/wound Issues: None    Code Status: Full Code    Pain control: good    Nausea control: good    Abnormal labs/tests/findings requiring intervention: micro hemorrhages on the temporal lobe     Family present during ED course? No   Family Comments/Social Situation comments: none    Tasks needing completion: None    Zoraida Poe RN  [facility]-- [phone] [place]  [phone] [facility]

## 2018-02-13 NOTE — H&P
[facility], [place]    History and Physical / Consult note: Trauma Service    Date of Admission:  2/13/2018    Time of Admission/Consult Request (page/call): 1210    Time of my evaluation: 1220  Consulting services:  Neurology - Time called: 0434    Neurosurgery    Assessment & Plan   Trauma mechanism:Fall from standing  Time/date of injury:02/12/2018 about 7pm  Known Injuries:  1. Hemorrhagic contusion of the brain  2. Extensive edema of the posterior brain- ? PRESS  Other diagnoses:   1. Encephalopathy  2. Essential Hypertension  3. Bipolar disorder  4. GERD  5. Depression and Anxiety  6. Nicotine dependence-started smoking 2 months ago, smokes 1PPD      Procedure: None     Plan:  1. Admit to trauma- Dr. Squires- intermediate care unit  2. Neurology: Question if the etiology of these microhemorrhages are as a result of the fall or uncontrolled/poorly controlled hypertension.This patient has not been compliant with her antihypertensive regimen. She states she has not taken any of her scheduled medications > 48 hours.    1. Consulted Neurology. Await final recommendations.  2. Neurosurgery consulted: not felt to be a traumatic bleed at this time, recommend better BP control.  3. Routine neurochecks  4. Strict BP control  3. Cardiac: Blood pressure control per Neurology recommendations.  4. Respiratory: No acute issues  5. Psych: Will resume PTA medications  6. GI/: regular diet- most likely no surgical interventions planned at this time. No patel  7. PT/OT when appropriate    Code status: Full confirmed with code.     General Cares:  GI Prophylaxis: Regular diet, resume PTA Protonix  DVT Prophylaxis: mechanical   Date of last stool/Bowel Regimen:PTA/ ordered  Pulmonary toilet:Cough and deep breath    ETOH: This patient was asked if in the last 3-6 months there has been a time when she had 4 or more drinks in a single day/outing.. Patient answer to the screening question was in the  negative. No intervention needed. She states she drinks about 2 glasses of win every other day. Denies alcohol use prior to the fall.  Primary Care Physician   Ander Fuchs    Chief Complaint   Headache    History is obtained from the patient, electronic health record and emergency department physician    History of Present Illness   Ana Laura Wharton is a 47 year old female who was recently hospitalized 12/13/17 to 01/05/2018 with complications from an intentional overdose with subsequent ARDS. She presents today with worsening headache and disorientation after she slipped and feel at her home last evening. She states sheslipped on some water, fell and hit her head around the right temple area. She states 911 was called and she was feeling OK so she refused transport. As time went on patient developed a headache, visual hallucinations, and she had trouble working her cell phone and felt disoriented. 911 was called again and she was transported to [facility]. She was found to be quite hypertensive, later placed on a Nicardipine infusion and transferred here for further cares. Of note she states she has not taken her medications including her blood pressure mediations since Sunday morning because she has been feeling down. She denies any chest discomfort, other recent trauma, lightheaded, fever, shortness of breath or trouble ambulating. She denies any urinary or respiratory symptoms.    Past Medical History    I have reviewed this patient's medical history and updated it with pertinent information if needed.   Past Medical History:   Diagnosis Date     Anxiety      Bipolar disorder (H)      C. difficile colitis      Depressive disorder      Essential hypertension 7/8/2015     Gastro-oesophageal reflux disease      Hypothyroidism 4/1/2015     Migraine      Spontaneous pneumothorax     x3, resolved w/ pleurodesis      Suicide attempt        Past Surgical History   I have reviewed this patient's surgical history  and updated it with pertinent information if needed.  Past Surgical History:   Procedure Laterality Date     ARTHROSCOPY KNEE      L knee     CERVIX SURGERY      for pre-cancerous changes     left knee surgery       ORTHOPEDIC SURGERY      L shoulder     THORACIC SURGERY      for pneumothorax, pleurodesis and lobe resection     Prior to Admission Medications   Prior to Admission Medications   Prescriptions Last Dose Informant Patient Reported? Taking?   IBUPROFEN PO   Yes No   Sig: Take 400-600 mg by mouth every 6 hours as needed for moderate pain   Levothyroxine Sodium (SYNTHROID PO)   Yes No   Sig: Take 50 mcg by mouth daily   OLANZapine (ZYPREXA) 5 MG tablet   No No   Sig: Take 1 tablet (5 mg) by mouth 2 times daily   Ondansetron (ZOFRAN ODT PO)   Yes No   Sig: Take 4 mg by mouth every 8 hours as needed for nausea   Pantoprazole Sodium (PROTONIX PO)   Yes No   Sig: Take 40 mg by mouth every morning (before breakfast)    carvedilol (COREG) 6.25 MG tablet   No No   Si tablet (6.25 mg) by Oral or Feeding Tube route 2 times daily   ciprofloxacin (CIPRO) 250 MG tablet   Yes No   Sig: Take 125 mg by mouth daily For UTI prevention   cloNIDine (CATAPRES) 0.1 MG tablet   Yes No   Sig: Take 0.1 mg by mouth 2 times daily   clonazePAM (KLONOPIN) 1 MG tablet   No No   Sig: Take 1 tablet (1 mg) by mouth 2 times daily as needed for anxiety   folic acid (FOLVITE) 1 MG tablet   No No   Sig: Take 1 tablet (1 mg) by mouth daily   hydrOXYzine (VISTARIL) 50 MG capsule   No No   Sig: Take 1 capsule (50 mg) by mouth 2 times daily as needed for anxiety   mirtazapine (REMERON) 15 MG tablet   No No   Sig: Take 1 tablet (15 mg) by mouth At Bedtime   predniSONE (DELTASONE) 10 MG tablet   Yes No   Sig: Take by mouth daily 30mg x 3 days then 20mg x 3 days then 10mg x 3 days then stop      Facility-Administered Medications: None     Allergies   Allergies   Allergen Reactions     Amoxicillin-Pot Clavulanate      PN: LW Reaction: Itching,  "Pruritis     Azithromycin      Other reaction(s): Dermatitis     Cephalexin      PN: LW Reaction: Itching, Pruritis     Ciprofloxacin Other (See Comments)     Joint pain cdiff     Compazine [Prochlorperazine] Other (See Comments)     dystonia     Metoclopramide Other (See Comments)     \"I feel like I am crawling out of my skin\"     Minocycline Nausea and Vomiting     PN: DIARRHEA, VOMITING, AND STOMACH CRAMPS     Penicillins Itching     Reglan [Metoclopramide Hcl] Other (See Comments)     Sensation of \"crawling out of skin\"     Restoril [Temazepam]      Thorazine [Chlorpromazine] Other (See Comments)     dystonia     Abilify [Aripiprazole] Rash     Lamotrigine Rash     Severe drug rash - contraindication to receiving again. Skin peeling.      Sulfa Drugs Rash       Social History   Social History     Social History     Marital status:      Spouse name: N/A     Number of children: N/A     Years of education: N/A     Occupational History     Not on file.     Social History Main Topics     Smoking status: Current Some Day Smoker     Types: Cigarettes     Last attempt to quit: 1/1/1997     Smokeless tobacco: Never Used     Alcohol use No     Drug use: No     Sexual activity: Yes     Partners: Male     Birth control/ protection: OCP     Other Topics Concern     Not on file     Social History Narrative       Family History   Family history reviewed with patient and is noncontributory.    Review of Systems   CONSTITUTIONAL: No fever, chills, sweats, fatigue   EYES: no visual blurring, no double vision or visual loss  ENT: no decrease in hearing, no tinnitus, no vertigo, no hoarseness  RESPIRATORY: no shortness of breath, no cough, no sputum   CARDIOVASCULAR: no palpitations, no chest  pain, no exertional chest pain or pressure  GASTROINTESTINAL: no nausea or vomiting, or abd pain  GENITOURINARY: no dysuria, no frequency or hesitancy, no hematuria  MUSCULOSKELETAL: no weakness, no redness, no swelling, no joint pain, "   SKIN: no rashes, ecchymoses, abrasions or lacerations  NEUROLOGIC: ,+ forgetfulness and blurry vision, no numbness or tingling of hands, no numbness or tingling  of feet, no syncope, no tremors or weakness  PSYCHIATRIC: no sleep disturbances, no anxiety or depression    Physical Exam       BP: (!) 171/91   Heart Rate: 64 Resp: 17 SpO2: 100 %      Vital Signs with Ranges  Temp:  [98.7  F (37.1  C)] 98.7  F (37.1  C)  Pulse:  [56] 56  Heart Rate:  [64-83] 64  Resp:  [16-17] 17  BP: (104-212)/() 171/91  SpO2:  [99 %-100 %] 100 % 115 lbs 0 oz    Primary Survey:  Airway: patient talking  Breathing: symmetric respiratory effort bilaterally  Circulation: central pulses present and peripheral pulses present  Disability: Pupils - left 4 mm and brisk, right 4 mm and brisk     Esmond Coma Scale - Total 14/15  Eye Response (E): 4  4= spontaneous,  3= to verbal/voice, 2=  to pain, 1= No response   Verbal Response (V): 4   5= Orientated, converses,  4= Confused, converses, 3= Inappropriate words,  2= Incomprehensible sounds,  1=No response   Motor Response (M): 6   6= Obeys commands, 5= Localizes to pain, 4= Withdrawal to pain, 3=Fexion to pain, 2= Extension to pain, 1= No response    Secondary Survey:  General: alert, oriented to person, place, time  Head: atraumatic, normocephalic, trachea midline  Eyes: PERRLA, pupils 4 mm, EOMI, corneas and conjunctivae clear  Ears: pearly grey bilateral TMs and non-inflamed external ear canals  Nose: nares patent, no drainage, nasal septum non-tender  Mouth/Throat: + white patches, no exudates or erythema,  no dental tenderness or malocclusions, no tongue lacerations  Neck:  No cervical collar present. No midline posterior tenderness, full AROM without pain or tenderness   Chest/Pulmonary: normal respiratory rate and rhythm,  bilateral clear breath sounds, no wheezes, rales or rhonchi, no chest wall tenderness or deformities,   Cardiovascular: S1, S2,  normal and regular rate and  rhythm, no murmurs  Abdomen: soft, non-tender, no guarding, no rebound tenderness and no tenderness to palpation  :  pelvis stable to lateral compression  Back/Spine: no deformity, no midline tenderness, no sacral tenderness,  no step-offs and no abrasions or contusions  Musculoskel/Extremities: normal extremities, full AROM of major joints without tenderness, edema, erythema, ecchymosis, or abrasions. +2 PP. no edema.   Hand: no gross deformities of hands or fingers. Full AROM of hand and fingers in flexion and extension.  strength equal and symmetric.   Skin: no rashes, laceration, ecchymosis, skin warm and dry.   Neuro: PERRLA, alert, oriented x 2-3. CN II-XII grossly intact. No focal deficits. Strength 5/5 x 4 extremities.  Sensation intact.  Psychiatric: affect/mood normal, cooperative, normal judgement/insight and memory intact    Data   UA RESULTS:  Recent Labs   Lab Test  01/01/18   1635   04/06/16   1050   COLOR  Dark Brown   < >  Yellow   APPEARANCE  Clear   < >  Clear   URINEGLC  Negative   < >  Neg   URINEBILI  Negative   < >  Neg   URINEKETONE  Negative   < >  Neg   SG  1.005   < >   --    UBLD  Negative   < >  Neg   URINEPH  5.0   < >   --    PROTEIN  Negative   < >   --    UROBILINOGEN   --    --   0.2   NITRITE  Negative   < >  Neg   LEUKEST  Negative   < >  Neg   RBCU  1   < >   --    WBCU  3*   < >   --     < > = values in this interval not displayed.      Results for orders placed or performed during the hospital encounter of 02/13/18 (from the past 24 hour(s))   Chest  XR, 1 view portable    Narrative    XR CHEST PORT 1 VW  2/13/2018 12:30 PM      HISTORY: head injury;      COMPARISON: Chest radiograph 1/2/2018    FINDINGS: Normal cardiac mediastinal silhouette and pulmonary  vasculature. Improvement in bilateral lung opacities Resolution of  previously seen bilateral pulmonary opacities. No substantial pleural  effusion. No pneumothorax. Left apical pleural thickening, similar to  prior  exam.  The visualized upper abdomen is unremarkable.      Impression    IMPRESSION:   1. Improvement in patchy lung opacities.  2. Continued bilateral pleural thickening.    I have personally reviewed the examination and initial interpretation  and I agree with the findings.    ESTEE ARCINIEGA MD       Studies:  Chest  XR, 1 view portable   Final Result   IMPRESSION:    1. Improvement in patchy lung opacities.   2. Continued bilateral pleural thickening.      I have personally reviewed the examination and initial interpretation   and I agree with the findings.      MD Mihai FINK CNP

## 2018-02-13 NOTE — ED PROVIDER NOTES
History     Chief Complaint   Patient presents with     Fall     Hypertension     HPI  Ana Laura Wharton is a 47 year old female who was recently hospitalized for pneumonia after an overdose who underwent a fall last night in which she apparently slipped on a puddle of water and fell forward hitting her right temple on the floor.  Patient states that she was somewhat woozy afterwards but did not have loss of consciousness.  Patient states she gradually developed a headache and was eventually seen in the ER with a blood pressure of 212/110.  Her CT was read as no hemorrhage but there was some swelling that was consistent with PRES syndrome versus trauma.  The patient was noted on CT to have a small contusion of the scalp as well over the right frontal bone.  MRI was recommended.  Subsequent MRI done at [facility] ER revealed extensive edema posteriorly with some microhemorrhages in the left occipital pole that again could be related to PRES syndrome or traumatic in nature.  The patient's blood pressure in the ER was controlled with hydralazine and then a nicardipine drip.  The patient was very sensitive to the nicardipine drip and this was discontinued.  The patient maintained a blood pressure under 160 and was transferred to our Emergency Department for further evaluation.  Upon arrival the patient states that she still is somewhat confused and cannot read the numbers on her cell phone accurately but denies any focal numbness or weakness denies any nausea and states her headache is much improved after treatment in the ER at [facility].    I have reviewed the Medications, Allergies, Past Medical and Surgical History, and Social History in the [facility] system.    Past Medical History:   Diagnosis Date     Anxiety      Bipolar disorder (H)      C. difficile colitis      Depressive disorder      Essential hypertension 7/8/2015     Gastro-oesophageal reflux disease      Hypothyroidism 4/1/2015     Migraine      Spontaneous  pneumothorax     x3, resolved w/ pleurodesis      Suicide attempt        Past Surgical History:   Procedure Laterality Date     ARTHROSCOPY KNEE      L knee     CERVIX SURGERY      for pre-cancerous changes     left knee surgery       ORTHOPEDIC SURGERY      L shoulder     THORACIC SURGERY      for pneumothorax, pleurodesis and lobe resection       Family History   Problem Relation Age of Onset     Depression Mother      Lipids Father      hyperlipidemia     Macular Degeneration Father        Social History   Substance Use Topics     Smoking status: Current Some Day Smoker     Types: Cigarettes     Last attempt to quit: 1/1/1997     Smokeless tobacco: Never Used     Alcohol use 1.2 oz/week     2 Glasses of wine per week       Previous Medications    CARVEDILOL (COREG) 6.25 MG TABLET    1 tablet (6.25 mg) by Oral or Feeding Tube route 2 times daily    CIPROFLOXACIN (CIPRO) 250 MG TABLET    Take 125 mg by mouth daily For UTI prevention    CLONAZEPAM (KLONOPIN) 1 MG TABLET    Take 1 tablet (1 mg) by mouth 2 times daily as needed for anxiety    CLONIDINE (CATAPRES) 0.1 MG TABLET    Take 0.1 mg by mouth 2 times daily    FOLIC ACID (FOLVITE) 1 MG TABLET    Take 1 tablet (1 mg) by mouth daily    HYDROXYZINE (VISTARIL) 50 MG CAPSULE    Take 1 capsule (50 mg) by mouth 2 times daily as needed for anxiety    IBUPROFEN PO    Take 400-600 mg by mouth every 6 hours as needed for moderate pain    LEVOTHYROXINE SODIUM (SYNTHROID PO)    Take 50 mcg by mouth daily    MIRTAZAPINE (REMERON) 15 MG TABLET    Take 1 tablet (15 mg) by mouth At Bedtime    OLANZAPINE (ZYPREXA) 5 MG TABLET    Take 1 tablet (5 mg) by mouth 2 times daily    ONDANSETRON (ZOFRAN ODT PO)    Take 4 mg by mouth every 8 hours as needed for nausea    PANTOPRAZOLE SODIUM (PROTONIX PO)    Take 40 mg by mouth every morning (before breakfast)     PREDNISONE (DELTASONE) 10 MG TABLET    Take by mouth daily 30mg x 3 days then 20mg x 3 days then 10mg x 3 days then stop       "  Allergies   Allergen Reactions     Amoxicillin-Pot Clavulanate      PN: LW Reaction: Itching, Pruritis     Azithromycin      Other reaction(s): Dermatitis     Cephalexin      PN: LW Reaction: Itching, Pruritis     Ciprofloxacin Other (See Comments)     Joint pain cdiff     Compazine [Prochlorperazine] Other (See Comments)     dystonia     Metoclopramide Other (See Comments)     \"I feel like I am crawling out of my skin\"     Minocycline Nausea and Vomiting     PN: DIARRHEA, VOMITING, AND STOMACH CRAMPS     Penicillins Itching     Reglan [Metoclopramide Hcl] Other (See Comments)     Sensation of \"crawling out of skin\"     Restoril [Temazepam]      Thorazine [Chlorpromazine] Other (See Comments)     dystonia     Abilify [Aripiprazole] Rash     Lamotrigine Rash     Severe drug rash - contraindication to receiving again. Skin peeling.      Sulfa Drugs Rash      Review of Systems   Eyes: Positive for visual disturbance (blurry vision).   Gastrointestinal: Negative for nausea.   Neurological: Positive for headaches. Negative for syncope, weakness and numbness.   Psychiatric/Behavioral: Positive for confusion.       Physical Exam   BP: (!) 138/92  Heart Rate: 64  Resp: 17  Weight: 52.2 kg (115 lb)  SpO2: 99 %      Physical Exam   Constitutional: She is oriented to person, place, and time.   Awake and alert   HENT:   Head: Normocephalic.   Eyes: EOM are normal. Pupils are equal, round, and reactive to light.   Neck: Neck supple.   Nontender posteriorly, airway patent   Cardiovascular: Normal heart sounds.    Pulmonary/Chest: Breath sounds normal. She exhibits no tenderness.   Abdominal: Soft. There is no tenderness.   Musculoskeletal: She exhibits no deformity.   Neurological: She is alert and oriented to person, place, and time.   Grossly intact with strength bilaterally   Skin: Skin is warm.   Psychiatric: She has a normal mood and affect.       ED Course     ED Course     Procedures          Labs and EKG as well as " images from ridges were reviewed.    Results for orders placed or performed during the hospital encounter of 02/13/18 (from the past 24 hour(s))   Chest  XR, 1 view portable    Narrative    XR CHEST PORT 1 VW  2/13/2018 12:30 PM      HISTORY: head injury;      COMPARISON: Chest radiograph 1/2/2018    FINDINGS: Normal cardiac mediastinal silhouette and pulmonary  vasculature. Improvement in bilateral lung opacities Resolution of  previously seen bilateral pulmonary opacities. No substantial pleural  effusion. No pneumothorax. Left apical pleural thickening, similar to  prior exam.  The visualized upper abdomen is unremarkable.      Impression    IMPRESSION:   1. Improvement in patchy lung opacities.  2. Continued bilateral pleural thickening.    I have personally reviewed the examination and initial interpretation  and I agree with the findings.    ESTEE ARCINIEGA MD   Alcohol   Result Value Ref Range    Ethanol g/dL <0.01 <0.01 g/dL   Hemoglobin   Result Value Ref Range    Hemoglobin 14.4 11.7 - 15.7 g/dL   ABO/Rh type and screen   Result Value Ref Range    ABO B     RH(D) Pos     Antibody Screen Neg     Test Valid Only At          [facility]    Specimen Expires 02/16/2018        Trauma surgery was asked to see the patient and at this time the patient will be admitted to their service.  The patient has had some labile hypertension but has not required a drip to control her blood pressure here in the ER.    Labs Ordered and Resulted from Time of ED Arrival Up to the Time of Departure from the ED   ALCOHOL ETHYL   HEMOGLOBIN   DRUG ABUSE SCREEN 6 CHEM DEP URINE ([facility])   ROUTINE UA WITH MICROSCOPIC REFLEX TO CULTURE   INR   ABO/RH TYPE AND SCREEN         Assessments & Plan (with Medical Decision Making)     I have reviewed the nursing notes.    I have reviewed the findings, diagnosis, and plan with the patient.    Final diagnoses:   Closed head injury, initial encounter  - with cerebral edema and microhemorrhages - r/o PRES   Labile hypertension     Patient will be admitted to the trauma service with neurology/neurosurgery in consult.    Mukul Armstrong MD      I, Celso Rosales, am serving as a trained medical scribe to document services personally performed by Mukul Armstrong MD, based on the provider's statements to me.   IMukul MD, was physically present and have reviewed and verified the accuracy of this note documented by Celso Rosales.     2/13/2018   [facility], [place], EMERGENCY DEPARTMENT     Mukul Armstrong MD  02/13/18 [id number]

## 2018-02-13 NOTE — CONSULTS
"[facility]  Neurology Consultation    Patient Name:  Ana Laura Wharton  MRN:  [id number]    :  1970  Date of Service:  2018  Primary care provider:  Ander Fuchs      Neurology consultation service was asked to see Ana Laura Wharton by Dr. Major to evaluate for PRES.    History of Present Illness:   Ms Wharton is a 47 year old woman with known h/o bipolar disorder, migraines, hypertension, hypothyroid, recurrent UTIs, chronic C. Dif, and recent overdose attempt who presents with a one day history of fall with visual changes. Yesterday at about 7 pm, she states that she slipped in a puddle of water on a concrete floor and fell. Immediately before falling, she states that she felt dizzy, clumsy, weak and disoriented. After falling, she remained on the floor for approximately an hour, after which she got up and had a headache. She denies any loss of consciousness before or after the fall. She took two relpax which may have made her headache worse. She began to have visual changes described as spots, lines, and glowing areas in her vision, as well as ringing in her ears. She tried to call the paramedics but she \"couldn't figure out the phone\". Eventually she called the paramedics, who saw her and did not think she needed to be brought in. Later that night, she felt anxious and unable to sleep, and took her BP at home, and thought the SBP was in the 300s. She had not been drinking or taking any drugs or new medications, and she has not had anything like this happen before. She states that she drinks 2 glasses of wine/week and smokes marijuana 1-2/month, does not use any other drugs. She has not taken her clonidine or any of her other medications since  due to feeling down. She was hospitalized 17-18 for attempted suicide by overdose with amlodipine, nyquil, acetaminophen, and vodka, and was admitted for ARDS, shock, sepsis.     Currently prescribed and " taking zyprexa, hydroxyzine, klonopin, clobetasol, clonidine, remeron, levothyroxine, protonix, and relpax.    ROS    + Confusion, headache  -Fever/chills, cough sinus congestion, nausea, vomiting, diarrhea, dysuria    PMH  Past Medical History:   Diagnosis Date     Anxiety      Bipolar disorder (H)      C. difficile colitis      Depressive disorder      Essential hypertension 7/8/2015     Gastro-oesophageal reflux disease      Hypothyroidism 4/1/2015     Migraine      Spontaneous pneumothorax     x3, resolved w/ pleurodesis      Suicide attempt      Past Surgical History:   Procedure Laterality Date     ARTHROSCOPY KNEE      L knee     CERVIX SURGERY      for pre-cancerous changes     left knee surgery       ORTHOPEDIC SURGERY      L shoulder     THORACIC SURGERY      for pneumothorax, pleurodesis and lobe resection       Medications   Current Facility-Administered Medications   Medication     clonazePAM (klonoPIN) tablet 1 mg     folic acid (FOLVITE) tablet 1 mg     levothyroxine (SYNTHROID/LEVOTHROID) tablet 50 mcg     OLANZapine (zyPREXA) tablet 5 mg     [START ON 2/14/2018] pantoprazole (PROTONIX) EC tablet 40 mg     naloxone (NARCAN) injection 0.1-0.4 mg     potassium chloride SA (K-DUR/KLOR-CON M) CR tablet 20-40 mEq     potassium chloride (KLOR-CON) Packet 20-40 mEq     potassium chloride 10 mEq in 100 mL sterile water intermittent infusion (premix)     potassium chloride 10 mEq in 100 mL intermittent infusion with 10 mg lidocaine     potassium chloride 20 mEq in 50 mL intermittent infusion     magnesium sulfate 4 g in 100 mL sterile water (premade)     HYDROmorphone (DILAUDID) injection 0.2 mg     senna-docusate (SENOKOT-S;PERICOLACE) 8.6-50 MG per tablet 1-2 tablet     ondansetron (ZOFRAN-ODT) ODT tab 4 mg    Or     ondansetron (ZOFRAN) injection 4 mg     metoclopramide (REGLAN) tablet 10 mg    Or     metoclopramide (REGLAN) injection 10 mg     acetaminophen (TYLENOL) tablet 650 mg     hydrALAZINE  "(APRESOLINE) injection 10 mg     labetalol (NORMODYNE/TRANDATE) injection 20 mg     nitroFURantoin (macrocrystal-monohydrate) (MACROBID) capsule 100 mg     predniSONE (DELTASONE) tablet 20 mg    Followed by     [START ON 2/15/2018] predniSONE (DELTASONE) tablet 10 mg         Allergies  Allergies   Allergen Reactions     Amoxicillin-Pot Clavulanate      PN: LW Reaction: Itching, Pruritis     Azithromycin      Other reaction(s): Dermatitis     Cephalexin      PN: LW Reaction: Itching, Pruritis     Ciprofloxacin Other (See Comments)     Joint pain cdiff     Compazine [Prochlorperazine] Other (See Comments)     dystonia     Metoclopramide Other (See Comments)     \"I feel like I am crawling out of my skin\"     Minocycline Nausea and Vomiting     PN: DIARRHEA, VOMITING, AND STOMACH CRAMPS     Penicillins Itching     Reglan [Metoclopramide Hcl] Other (See Comments)     Sensation of \"crawling out of skin\"     Restoril [Temazepam]      Thorazine [Chlorpromazine] Other (See Comments)     dystonia     Abilify [Aripiprazole] Rash     Lamotrigine Rash     Severe drug rash - contraindication to receiving again. Skin peeling.      Sulfa Drugs Rash       Social History  Social History   Substance Use Topics     Smoking status: Current Some Day Smoker     Types: Cigarettes     Last attempt to quit: 1/1/1997     Smokeless tobacco: Never Used     Alcohol use 1.2 oz/week     2 Glasses of wine per week       Family History    Family History   Problem Relation Age of Onset     Depression Mother      Lipids Father      hyperlipidemia     Macular Degeneration Father          Physical Examination   Vitals: BP (!) 145/96  Resp 20  Wt 52.2 kg (115 lb)  LMP 01/13/2018  SpO2 100%  BMI 20.37 kg/m2  Neurologic:  - alert and oriented to person, place, time, and situation; language appropriate, follows commands reliably; correctly identifies number of quarters in $1.75, names 2 past presidents  - visual fields intact; pupils 3 mm symmetric, " "round and reactive to light; EOMI without nystagmus  - no facial asymmetry; tongue movements full; palate rise symmetric; no dysarthria  - sensation grossly intact to light touch throughout  - muscle tone and bulk symmetric and appropriate; strength diffusely and symmetrically reduced throughout with low effort on strength testing, no asymmetric or localizing weakness  - DTR 2+ and symmetric in upper extremities, reduced in lower extremities; no clonus; plantar reflex withdraw on attempt  - finger-nose-finger intact BL  -Gait evaluation not assessed    Constitutional: Lying in bed and appears uncomfortable, prefers eyes closed, WDWN, generally cooperative with examination and interview  HEENT: No evidence of oral trauma   Cardiovascular: regular rhythm  Respiratory:symmetric chest rise, no accessory muscle use  Abdominal: Soft  Extremities: no clubbing of digits, no pitting edema  Skin: No lesions or rashes    Investigations imaging reports copy directly to ensure accuracy  CT Head 2/13/18:  \"IMPRESSION:  1. White matter low attenuation within bilateral occipital and  posterior parietal lobes, new since 12/20/2017. This is nonspecific  and could represent edema related to trauma, but posterior reversible  leukoencephalopathy could have a similar appearance. If clinically  relevant, an MR of the brain could be considered for further  evaluation.  2. No other findings suspicious for acute intracranial abnormality.  3. Small contusion in the scalp overlying the right frontal bone.\"    MRI Head 2/13/18:  \"IMPRESSION:    1. Extensive foci of edema in the brain mainly posteriorly but also  involving central structures, left cerebellar hemisphere and left  temporal lobe white matter and portions of the basal ganglia as  described above. The distribution is typical of posterior reversible  encephalopathy syndrome, particularly in light of the patient's  episode of severe hypertension.  2. Microhemorrhages within the left " "occipital pole and in the left  side of the posterior body of the left corpus callosum are most likely  related to the same posterior reversible encephalopathy syndrome, but  in light of the right frontal trauma, the possibility of hemorrhagic  contusions should also be considered as an etiology for the  microhemorrhages. 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  There are multiple previously described precipitants of this condition, most frequently hypertension, renal disease, and immunosuppressive therapy.  She is not known to be pregnant and has not been in contact with immunosuppressive therapy per chart review.  In her case, hypertension is the most likely contributor as she had blood pressure as high as 212/113 in the emergency department.  This condition is thought to be reversible if the offending precipitant is removed as able.  We recommend adequate blood pressure management.  She admittedly had not taken her home medications in at least 4 days.  She is at risk for seizures, however in the absence of seizure-like activity, we do not advise starting antiepileptic medication at this time.  Please notify the on-call neurologist if there is concern for seizure-like activity.  A follow-up MRI should be done in about 1 month to demonstrate resolution of findings.  -Blood pressure management per admitting team   -follow-up MRI in 1 month    Thank you for involving neurology in the care of Ana Laura Wharton.  Please do not hesitate to call with questions/concerns (consult pager [phone]).      Patient was discussed with Dr. Mckeon.    This note was transcribed with the assistance of Nghia Hidalgo MS3    Jeff Maier, DO  PGY3 Neurology  "

## 2018-02-13 NOTE — ED NOTES
Bed: ED21  Expected date: 2/13/18  Expected time: 11:30 AM  Means of arrival: Ambulance  Comments:  Ana Laura SEYMOUR, [id number], transfer from [facility] for Head injury with MRI findings and HTN.

## 2018-02-13 NOTE — ED NOTES
Pt comes to [facility] from [facility] by ambulance. Pt states she fell yesterday around 7pm. Pt was in home and slipped on some water, falling onto her head (right temple area). Pt states 911 was called and she was feeling OK so she refused transport. As time went on patient developed a headache, visual hallucinations, and she had trouble working her cell phone (seeing/understanding the buttons). 911 was called again and pt was transported to [facility]. At the hospital pt was found to be lethargic and hypertensive. Pt was given zofran and dilaudid in ER. CT and MRI were done. Patient was placed on a nicardipine drip and was found to be very sensitive to medication and BP dropped. Drip was stopped. According to EMS, ER told them MRI was suspicious for possible microhemorhages and pt was transferred here for trauma consult.     EMS states pt's BPs were 130-150systolic/ 80-90s diastolic. EKG showed NSR. Pt lethargic and fatigued for EMS (pt hasn't slept for 24hrs). 22g in left hand.

## 2018-02-13 NOTE — IP AVS SNAPSHOT
MRN:[id number]                      After Visit Summary   2/13/2018    Ana Laura Wharton    MRN: [id number]           Thank you!     Thank you for choosing [facility] for your care. Our goal is always to provide you with excellent care. Hearing back from our patients is one way we can continue to improve our services. Please take a few minutes to complete the written survey that you may receive in the mail after you visit with us. Thank you!        Patient Information     Date Of Birth          1970        About your hospital stay     You were admitted on:  February 13, 2018 You last received care in the:  [address] [facility]    You were discharged on:  February 14, 2018        Reason for your hospital stay       1. Reversible posterior leukoencephalopathy syndrome  2. Traumatic fall with possible traumatic brain injury                  Who to Call     For medical emergencies, please call 911.  For non-urgent questions about your medical care, please call your primary care provider or clinic, [phone]          Attending Provider     Provider Specialty    Mukul Armstrong MD Emergency Medicine    Clements, Taurus Bolden MD General Surgery       Primary Care Provider Office Phone # Fax #    Ander Fuchs [phone]      After Care Instructions     Activity       Your activity upon discharge: activity as tolerated            Diet       Follow this diet upon discharge: Regular            Discharge Instructions       Please take your medications daily as prescribed, do not skip any doses, if you have to skip a dose please call your primary care physicians for advice.    Please check your blood pressure twice a day at home, record the numbers and present to your primary care doctor during your next visit.            Wound care and dressings       Instructions to care for your wound at home: daily dressing changes as previously directed with routine follow up by you  primary care physician.                  Follow-up Appointments     Adult [facility] Follow-up and recommended labs and tests       1. Follow up with your primary care provider (Dr. Fleming) for continued medical care and hospital follow up in 3-5 days to discuss tapering off Clonidine and continued wound care    Medication Therapy Management Services  If you have any questions regarding your medications after discharge, this service is available to you.  Please call:  [phone] or [phone] (toll-free)  [facility]  [address] [place] [address]  [email or web address]    2. Neurology Clinic: with any available provider in 1 month with repeat MRI prior to visit  [facility]  [address]   [address] [place] [address]   Appointments: [phone]       Appointments on [place] and/or [facility] (with [facility] or [facility] provider or service). Call [phone] if you haven't heard regarding these appointments within 7 days of discharge.                  Your next 10 appointments already scheduled     Apr 19, 2018  3:00 PM CDT   Adult Med Follow UP with Jaden Rodriguez MD   Psychiatry Clinic ([facility])    [address]  [address] [facility] [address] [phone]              Pending Results     Date and Time Order Name Status Description    2/14/2018 0700 Urine Culture Aerobic Bacterial Preliminary     2/13/2018 1847 Blood culture Preliminary     2/13/2018 1847 Blood culture Preliminary     2/13/2018 1435 Urine Culture Aerobic Bacterial Preliminary             Statement of Approval     Ordered          02/14/18 1403  I have reviewed and agree with all the recommendations and orders detailed in this document.  EFFECTIVE NOW     Approved and electronically signed by:  Mihai Major APRN CNP             Admission Information     Date & Time Provider Department Dept. Phone     2/13/2018 Taurus qSuires MD [address] [facility] [phone]      Your Vitals Were     Blood Pressure Pulse Temperature Respirations Weight Last Period    147/85 75 97  F (36.1  C) (Oral) 16 52.2 kg (115 lb) 01/13/2018    Pulse Oximetry BMI (Body Mass Index)                99% 20.37 kg/m2          [email or web address] Information     [employer] gives you secure access to your electronic health record. If you see a primary care provider, you can also send messages to your care team and make appointments. If you have questions, please call your primary care clinic.  If you do not have a primary care provider, please call [phone] and they will assist you.        Care EveryWhere ID     This is your Care EveryWhere ID. This could be used by other organizations to access your [facility] medical records  [id number]        Equal Access to Services     OLGA LUTZ : Liliam Staley, rolando burnette, jaspal valdovinos . So [facility] [phone].    ATENCIÓN: Si habla español, tiene a gold disposición servicios gratuitos de asistencia lingüística. Llame al [phone].    We comply with applicable federal civil rights laws and [place] laws. We do not discriminate on the basis of race, color, national origin, age, disability, sex, sexual orientation, or gender identity.               Review of your medicines      START taking        Dose / Directions    acetaminophen 325 MG tablet   Commonly known as:  TYLENOL   Used for:  Traumatic cerebral edema without loss of consciousness without open intracranial wound, initial encounter (H)        Dose:  975 mg   Take 3 tablets (975 mg) by mouth every 8 hours as needed for mild pain or fever   Quantity:  30 tablet   Refills:  0       nitroFURantoin (macrocrystal-monohydrate) 100 MG capsule   Commonly known as:  MACROBID   Indication:  Urinary Tract Infection   Used for:  Urinary tract infection without hematuria, site unspecified         Dose:  100 mg   Take 1 capsule (100 mg) by mouth every 12 hours   Quantity:  14 capsule   Refills:  0         CONTINUE these medicines which have NOT CHANGED        Dose / Directions    carvedilol 6.25 MG tablet   Commonly known as:  COREG   Used for:  Paroxysmal atrial fibrillation (H)        Dose:  6.25 mg   1 tablet (6.25 mg) by Oral or Feeding Tube route 2 times daily   Quantity:  60 tablet   Refills:  0       CIPRO 250 MG tablet   Generic drug:  ciprofloxacin        Dose:  125 mg   Take 125 mg by mouth daily For UTI prevention   Refills:  0       clonazePAM 1 MG tablet   Commonly known as:  klonoPIN   Used for:  Bipolar I disorder (H)        Dose:  1 mg   Take 1 tablet (1 mg) by mouth 2 times daily as needed for anxiety   Quantity:  60 tablet   Refills:  1       cloNIDine 0.1 MG tablet   Commonly known as:  CATAPRES        Dose:  0.1 mg   Take 0.1 mg by mouth 2 times daily   Refills:  0       folic acid 1 MG tablet   Commonly known as:  FOLVITE   Used for:  Alcoholic intoxication with complication (H)        Dose:  1 mg   Take 1 tablet (1 mg) by mouth daily   Quantity:  30 tablet   Refills:  0       hydrOXYzine 50 MG capsule   Commonly known as:  VISTARIL   Used for:  Bipolar I disorder (H)        Dose:  50 mg   Take 1 capsule (50 mg) by mouth 2 times daily as needed for anxiety   Quantity:  60 capsule   Refills:  1       IBUPROFEN PO        Dose:  400-600 mg   Take 400-600 mg by mouth every 6 hours as needed for moderate pain   Refills:  0       mirtazapine 15 MG tablet   Commonly known as:  REMERON   Used for:  Bipolar I disorder (H)        Dose:  15 mg   Take 1 tablet (15 mg) by mouth At Bedtime   Quantity:  30 tablet   Refills:  0       OLANZapine 5 MG tablet   Commonly known as:  zyPREXA   Used for:  Bipolar I disorder (H)        Dose:  5 mg   Take 1 tablet (5 mg) by mouth 2 times daily   Quantity:  60 tablet   Refills:  1       predniSONE 10 MG tablet   Commonly known as:  DELTASONE   Indication:   Perennial Allergic Rhinitis        Take by mouth daily 30mg x 3 days then 20mg x 3 days then 10mg x 3 days then stop   Refills:  0       PROTONIX PO        Dose:  40 mg   Take 40 mg by mouth every morning (before breakfast)   Refills:  0       SYNTHROID PO        Dose:  50 mcg   Take 50 mcg by mouth daily   Refills:  0       ZOFRAN ODT PO        Dose:  4 mg   Take 4 mg by mouth every 8 hours as needed for nausea   Refills:  0            Where to get your medicines      These medications were sent to [facility] [place], [place] - [address]  [address] [address]     Phone:  [phone]     nitroFURantoin (macrocrystal-monohydrate) 100 MG capsule         Some of these will need a paper prescription and others can be bought over the counter. Ask your nurse if you have questions.     You don't need a prescription for these medications     acetaminophen 325 MG tablet                Protect others around you: Learn how to safely use, store and throw away your medicines at www.disposemymeds.org.             Medication List: This is a list of all your medications and when to take them. Check marks below indicate your daily home schedule. Keep this list as a reference.      Medications           Morning Afternoon Evening Bedtime As Needed    acetaminophen 325 MG tablet   Commonly known as:  TYLENOL   Take 3 tablets (975 mg) by mouth every 8 hours as needed for mild pain or fever   Last time this was given:  975 mg on 2/14/2018 11:53 AM                                carvedilol 6.25 MG tablet   Commonly known as:  COREG   1 tablet (6.25 mg) by Oral or Feeding Tube route 2 times daily   Last time this was given:  6.25 mg on 2/14/2018  7:45 AM                                CIPRO 250 MG tablet   Take 125 mg by mouth daily For UTI prevention   Last time this was given:  125 mg on 2/14/2018 11:52 AM   Generic drug:  ciprofloxacin                                clonazePAM 1 MG  tablet   Commonly known as:  klonoPIN   Take 1 tablet (1 mg) by mouth 2 times daily as needed for anxiety                                cloNIDine 0.1 MG tablet   Commonly known as:  CATAPRES   Take 0.1 mg by mouth 2 times daily   Last time this was given:  0.1 mg on 2/14/2018  7:45 AM                                folic acid 1 MG tablet   Commonly known as:  FOLVITE   Take 1 tablet (1 mg) by mouth daily   Last time this was given:  1 mg on 2/14/2018  7:45 AM                                hydrOXYzine 50 MG capsule   Commonly known as:  VISTARIL   Take 1 capsule (50 mg) by mouth 2 times daily as needed for anxiety                                IBUPROFEN PO   Take 400-600 mg by mouth every 6 hours as needed for moderate pain                                mirtazapine 15 MG tablet   Commonly known as:  REMERON   Take 1 tablet (15 mg) by mouth At Bedtime                                nitroFURantoin (macrocrystal-monohydrate) 100 MG capsule   Commonly known as:  MACROBID   Take 1 capsule (100 mg) by mouth every 12 hours   Last time this was given:  100 mg on 2/14/2018  7:45 AM                                OLANZapine 5 MG tablet   Commonly known as:  zyPREXA   Take 1 tablet (5 mg) by mouth 2 times daily   Last time this was given:  5 mg on 2/14/2018  7:45 AM                                predniSONE 10 MG tablet   Commonly known as:  DELTASONE   Take by mouth daily 30mg x 3 days then 20mg x 3 days then 10mg x 3 days then stop   Last time this was given:  20 mg on 2/14/2018  7:44 AM                                PROTONIX PO   Take 40 mg by mouth every morning (before breakfast)   Last time this was given:  40 mg on 2/14/2018  7:45 AM                                SYNTHROID PO   Take 50 mcg by mouth daily   Last time this was given:  50 mcg on 2/14/2018  7:44 AM                                ZOFRAN ODT PO   Take 4 mg by mouth every 8 hours as needed for nausea

## 2018-02-13 NOTE — IP AVS SNAPSHOT
[address] [address]    Phone:  [phone]                                       After Visit Summary   2/13/2018    Ana Laura Wharton    MRN: [id number]           After Visit Summary Signature Page     I have received my discharge instructions, and my questions have been answered. I have discussed any challenges I see with this plan with the nurse or doctor.    ..........................................................................................................................................  Patient/Patient Representative Signature      ..........................................................................................................................................  Patient Representative Print Name and Relationship to Patient    ..................................................               ................................................  Date                                            Time    ..........................................................................................................................................  Reviewed by Signature/Title    ...................................................              ..............................................  Date                                                            Time

## 2018-02-13 NOTE — ED PROVIDER NOTES
History     Chief Complaint:  Headache    HPI   Ana Laura Wharton is a 47-year-old female with complicated past medical history noted below who presents via EMS for evaluation of headache in the setting of falling last night at 1900 (roughly 7 hours prior to arrival).  The patient reports that the headache started a few hours after the incident and has been getting progressively worse since.  She states that she had a mechanical fall hitting the front right side of her head on a tile floor, no loss of consciousness, no neck pain.  Endorses nausea but has not had any vomiting, numbness, tingling, weakness, or back pain.  The patient was able to get up and walk after the incident.  In the patient's medication record, it does state that she was receiving Lovenox injections but the patient currently denies taking any blood thinning medications.    Allergies:  Augmentin  Azithromycin  Cephalexin  Ciprofloxacin  Compazine [Prochlorperazine]  Metoclopramide  Minocycline  Penicillins  Reglan [Metoclopramide Hcl]  Restoril [Temazepam]  Thorazine [Chlorpromazine]  Abilify [Aripiprazole]  Lamotrigine  Sulfa Drugs      Medications:    Zyprexa  Hydroxyzine  Klonopin  Tessalon  Carvedilol  Clonidine  Remeron  Thiamine  Levothyroxine  Protonix    Past Medical History:    Anxiety  Bipolar disorder  C. difficile colitis  Depressive disorder  GERD  Hyperlipidemia  Hypertension  Hypothyroidism  Migraine   Paroxysmal atrial fibrillation  Pneumothorax  Suicide attempt    Past Surgical History:    Knee arthroscopically, left  Cervix surgery  Shoulder surgery, left  Thoracic surgery, pneumothorax/low resection    Family History:    Depression  Hyperlipidemia  Macular degeneration    Social History:  Marital Status:   Presents to the ED via EMS  Tobacco Use: Current smoker  Alcohol Use: No  PCP: Ander Fuchs      Review of Systems   Eyes: Positive for photophobia.   Gastrointestinal: Positive for nausea. Negative for vomiting.    Neurological: Positive for headaches. Negative for syncope, weakness and numbness.   All other systems reviewed and are negative.    Physical Exam     Patient Vitals for the past 24 hrs:   BP Temp Temp src Pulse Resp SpO2   02/13/18 0631 104/68 - - - - -   02/13/18 0630 115/71 - - - - 100 %   02/13/18 0629 - - - - - 100 %   02/13/18 0628 - - - - - 100 %   02/13/18 0626 - - - - - 100 %   02/13/18 0625 - - - - - 100 %   02/13/18 0624 - - - - - 100 %   02/13/18 0622 - - - - - 100 %   02/13/18 0621 - - - - - 100 %   02/13/18 0620 - - - - - 100 %   02/13/18 0618 - - - - - 100 %   02/13/18 0617 - - - - - 99 %   02/13/18 0616 - - - - - 99 %   02/13/18 0615 115/76 - - - - -   02/13/18 0613 121/72 - - - - 99 %   02/13/18 0600 176/80 - - - - 100 %   02/13/18 0545 (!) 197/107 - - - - 99 %   02/13/18 0530 - - - - - 100 %   02/13/18 0445 (!) 209/117 - - - - -   02/13/18 0430 (!) 204/104 - - - - -   02/13/18 0415 (!) 212/113 - - - - -   02/13/18 0412 - - - - - 100 %   02/13/18 0410 - - - - - 100 %   02/13/18 0409 - - - - - 100 %   02/13/18 0408 (!) 206/106 - - 56 16 -   02/13/18 0407 - - - - - 99 %   02/13/18 0406 - 98.7  F (37.1  C) Temporal - - 100 %   02/13/18 0405 - - - - - 99 %        Physical Exam  Constitutional: The patient is oriented to person, place, and time. Alert and cooperative.  HENT:   Right Ear: External ear normal. TM normal appearing.   Left Ear: External ear normal. TM normal appearing.   Nose: Nose normal.   Mouth/Throat: Uvula is midline, oropharynx is clear and moist and mucous membranes are normal. No posterior oropharyngeal edema or erythema.   Eyes: Conjunctivae, EOM and lids are normal. Pupils are equal, round, and reactive to light.   Neck: Trachea normal. Normal range of motion. Neck supple.   Cardiovascular: bradycardia, regular rhythm, normal heart sounds, and intact distal pulses.    Pulmonary/Chest: Effort normal and breath sounds equal bilaterally. No crackles or wheezing.   Abdominal: Soft.  No tenderness. No rebound and no guarding.   Musculoskeletal: Normal range of motion.  No extremity tenderness or edema. No midline tenderness, step-offs, or deformities in the C/T/L spine.  Neuro : Alert and oriented x 3. Cranial nerves 2-12 grossly intact. No facial asymmetry, dysarthria, or aphasia. Strength 5/5 in upper and lower extremities bilaterally. Sensation intact to light touch throughout.   Skin: Skin is dry. No rash noted.          Emergency Department Course   ECG:  @ 0536  Indication: Hypertension   Vent. Rate 52 bpm. OR interval 148 ms. QRS duration 86 ms. QT/QTc 458/425 ms. P-R-T axis 65 77 72.   Sinus bradycardia. Otherwise normal ECG.   Read @ 0538 by Dr. Feliz.     Imaging:  CT Head w/o Contrast  1. White matter low attenuation within bilateral posterior occipital and parietal lobes, new since 12/20/2017. This is nonspecific and could represent edema related to trauma, but posterior reversible  leukoencephalopathy could have a similar appearance. If clinically relevant, an MR of the brain could be considered for further evaluation.  2. No other findings suspicious for acute intracranial abnormality.  3. Small contusion in the scalp overlying the right frontal bone.    MR Brain w/o & w Contrast  Pending.    Radiographic findings were communicated with the patient who voiced understanding of the findings.    Laboratory:  Blood:  CBC:  WBC 16.1, HGB 15.4, , otherwise WNL   BMP: glucose 108, otherwise WNL (Creatinine 0.52)   Troponin I: <0.015     Interventions:  (2740) Hydralazine, 10 mg, IV   (0615) Zofran, 4 mg, IV injection  (0615) Dilaudid, 0.5 mg, IV injection   (0619) Cardene infusion, 2.5 mg/hr, infusion     Emergency Department Course:  Nursing notes and vitals reviewed.    (7371) I entered the room with my scribe, obtained the history, and performed an exam of the patient as documented above.    The patient was sent for a head CT while in the emergency department, findings above.       EKG was done, interpretation as above.     (7840) Dr. Lombardo of Radiology called me regarding the CT results.     A peripheral IV was established. Blood was drawn from the patient. This was sent for laboratory testing, findings above.      The patient received the interventions above.      The patient was sent for a MRI of the brain while in the emergency department, findings above.      Findings and plan explained to the patient who consents to admission.     0730: Signed out to my colleague, Dr. Holland, pending MRI results and final disposition.    Impression & Plan    Medical Decision Making:  Ana Laura Wharton is a 47-year-old female with complicated past medical history noted below who presents via EMS for evaluation of headache in the setting of falling last night at 1900 (roughly 7 hours prior to arrival).  Upon presentation in the ED, the patient is nontoxic-appearing.  She is hypertensive and mildly bradycardic, but vitals are otherwise within normal limits and stable.  On exam, she is well-appearing.  She is alert and oriented ×3.  Cranial nerves II through XII are grossly intact.  Strength is 5/5 in the upper and lower extremities bilaterally.  Sensation is intact to light touch throughout.  She has no signs of basilar skull fracture.  She has no midline tenderness, step-offs, or deformities in the C/C/L-spine.  Cardiopulmonary exam is unremarkable.  Abdomen is soft and nontender throughout.  The rest of her exam is as mentioned above.     EKG is obtained and demonstrates sinus bradycardia.  There are no concerning acute ischemic changes.  Labs were obtained and are as mentioned above.  Notably, creatinine and troponin are within normal limits.  She does have a leukocytosis of 16.1, but denies any fever to suggest an underlying infectious process.  Head CT was obtained and demonstrates white matter low attenuation within the bilateral occipital and posterior parietal lobes.  Per radiology, this is  nonspecific, but could represent edema related to trauma.  They also note that this could represent posterior reversible leukoencephalopathy.  They do recommend MRI for further evaluation.  There are otherwise no acute intracranial abnormalities.  These results were discussed with the patient and she notes understanding.    The patient was given 1 dose of hydralazine for her hypertension.  Following this, she had significant improvement in her blood pressure.  Although the nicardipine was ordered, given the significant improvement in her blood pressure following the 1 dose of hydralazine, the nicardipine was not continued.  Upon my repeat evaluation after the improvement in her blood pressure and the analgesics, the patient notes significant improvement in her symptoms.     It is unclear if the abnormal findings on the head CT are related to trauma versus PRES, therefore MRI of the brain was ordered. If the MRI is concerning for traumatic injury, the patient may potentially need transfer for neurosurgery involvement.  The patient was signed out to my colleague, Dr. Holland pending MRI results and final disposition.  She was stable/improved at the time of signout.    Diagnosis:  Headache  Hypertension      Disposition:  Signed out to my colleague, Dr. Holland        [facility] EMERGENCY DEPARTMENT      Scribe disclosure:  Freeman CRUZ, am serving as a scribe on 2/13/2018 at 4:17 AM to personally document services performed by Lolita Feliz MD based on my observations and the provider's statements to me.                Lolita Feliz MD  02/13/18 0745

## 2018-02-13 NOTE — PHARMACY-ADMISSION MEDICATION HISTORY
Admission medication history interview status for this patient is complete. See [facility] admission navigator for allergy information, prior to admission medications and immunization status.     Medication history interview source(s):Patient  Medication history resources (including written lists, pill bottles, clinic record): DC summary from [facility] 1/5/18, [facility] CareEverywhere    Changes made to PTA medication list:  Added: ciprofloxacin, prednisone  Deleted: benzonatate, enoxaparin, thiamine, vancomcyin (pt states she finished this 'recently')  Changed: clonidine TID --> BID    Actions taken by pharmacist (provider contacted, etc):None     Additional medication history information:None    Medication reconciliation/reorder completed by provider prior to medication history? No    Do you take OTC medications (eg tylenol, ibuprofen, fish oil, eye/ear drops, etc)? Y(Y/N)    For patients on insulin therapy: N (Y/N)        Prior to Admission medications    Medication Sig Last Dose Taking? Auth Provider   cloNIDine (CATAPRES) 0.1 MG tablet Take 0.1 mg by mouth 2 times daily 2/12/2018 at pm Yes Unknown, Entered By History   predniSONE (DELTASONE) 10 MG tablet Take by mouth daily 30mg x 3 days then 20mg x 3 days then 10mg x 3 days then stop 2/12/2018 at Day 3 of 30mg - start 20mg on 2/13 Yes Unknown, Entered By History   ciprofloxacin (CIPRO) 250 MG tablet Take 125 mg by mouth daily For UTI prevention 2/12/2018 at Unknown time Yes Unknown, Entered By History   OLANZapine (ZYPREXA) 5 MG tablet Take 1 tablet (5 mg) by mouth 2 times daily 2/12/2018 at pm Yes Jaden Rodriguez MD   hydrOXYzine (VISTARIL) 50 MG capsule Take 1 capsule (50 mg) by mouth 2 times daily as needed for anxiety Past Week at Unknown time Yes Jaden Rodriguez MD   clonazePAM (KLONOPIN) 1 MG tablet Take 1 tablet (1 mg) by mouth 2 times daily as needed for anxiety Past Week at Unknown time Yes Jaden Rodriguez MD   carvedilol (COREG) 6.25 MG tablet 1  tablet (6.25 mg) by Oral or Feeding Tube route 2 times daily 2/12/2018 at pm Yes Negin Mathias PA-C   folic acid (FOLVITE) 1 MG tablet Take 1 tablet (1 mg) by mouth daily 2/12/2018 at Unknown time Yes Negin Mathias PA-C   mirtazapine (REMERON) 15 MG tablet Take 1 tablet (15 mg) by mouth At Bedtime 2/12/2018 at pm Yes Negin Mathias PA-C   IBUPROFEN PO Take 400-600 mg by mouth every 6 hours as needed for moderate pain Past Month at Unknown time Yes Unknown, Entered By History   Levothyroxine Sodium (SYNTHROID PO) Take 50 mcg by mouth daily 2/12/2018 at am Yes Unknown, Entered By History   Ondansetron (ZOFRAN ODT PO) Take 4 mg by mouth every 8 hours as needed for nausea Past Month at Unknown time Yes Unknown, Entered By History   Pantoprazole Sodium (PROTONIX PO) Take 40 mg by mouth every morning (before breakfast)  2/12/2018 at am Yes Reported, Patient

## 2018-02-14 ENCOUNTER — APPOINTMENT (OUTPATIENT)
Dept: OCCUPATIONAL THERAPY | Facility: CLINIC | Age: 48
DRG: 070 | End: 2018-02-14
Attending: NURSE PRACTITIONER
Payer: COMMERCIAL

## 2018-02-14 VITALS
BODY MASS INDEX: 20.37 KG/M2 | DIASTOLIC BLOOD PRESSURE: 85 MMHG | HEART RATE: 75 BPM | WEIGHT: 115 LBS | SYSTOLIC BLOOD PRESSURE: 147 MMHG | TEMPERATURE: 97 F | RESPIRATION RATE: 16 BRPM | OXYGEN SATURATION: 99 %

## 2018-02-14 LAB
ALBUMIN UR-MCNC: NEGATIVE MG/DL
ANION GAP SERPL CALCULATED.3IONS-SCNC: 8 MMOL/L (ref 3–14)
APPEARANCE UR: CLEAR
BILIRUB UR QL STRIP: NEGATIVE
BUN SERPL-MCNC: 16 MG/DL (ref 7–30)
CALCIUM SERPL-MCNC: 9 MG/DL (ref 8.5–10.1)
CHLORIDE SERPL-SCNC: 99 MMOL/L (ref 94–109)
CO2 SERPL-SCNC: 25 MMOL/L (ref 20–32)
COLOR UR AUTO: YELLOW
CREAT SERPL-MCNC: 0.6 MG/DL (ref 0.52–1.04)
ERYTHROCYTE [DISTWIDTH] IN BLOOD BY AUTOMATED COUNT: 13.6 % (ref 10–15)
GFR SERPL CREATININE-BSD FRML MDRD: >90 ML/MIN/1.7M2
GLUCOSE SERPL-MCNC: 200 MG/DL (ref 70–99)
GLUCOSE UR STRIP-MCNC: NEGATIVE MG/DL
HCT VFR BLD AUTO: 41.2 % (ref 35–47)
HGB BLD-MCNC: 13.5 G/DL (ref 11.7–15.7)
HGB UR QL STRIP: NEGATIVE
KETONES UR STRIP-MCNC: NEGATIVE MG/DL
LEUKOCYTE ESTERASE UR QL STRIP: ABNORMAL
MAGNESIUM SERPL-MCNC: 2 MG/DL (ref 1.6–2.3)
MCH RBC QN AUTO: 32.8 PG (ref 26.5–33)
MCHC RBC AUTO-ENTMCNC: 32.8 G/DL (ref 31.5–36.5)
MCV RBC AUTO: 100 FL (ref 78–100)
NITRATE UR QL: NEGATIVE
PH UR STRIP: 6.5 PH (ref 5–7)
PHOSPHATE SERPL-MCNC: 3.3 MG/DL (ref 2.5–4.5)
PLATELET # BLD AUTO: 232 10E9/L (ref 150–450)
POTASSIUM SERPL-SCNC: 4.3 MMOL/L (ref 3.4–5.3)
RBC # BLD AUTO: 4.11 10E12/L (ref 3.8–5.2)
RBC #/AREA URNS AUTO: 4 /HPF (ref 0–2)
SODIUM SERPL-SCNC: 132 MMOL/L (ref 133–144)
SOURCE: ABNORMAL
SP GR UR STRIP: 1.01 (ref 1–1.03)
SQUAMOUS #/AREA URNS AUTO: 4 /HPF (ref 0–1)
TRANS CELLS #/AREA URNS HPF: <1 /HPF (ref 0–1)
UROBILINOGEN UR STRIP-MCNC: NORMAL MG/DL (ref 0–2)
WBC # BLD AUTO: 12.5 10E9/L (ref 4–11)
WBC #/AREA URNS AUTO: 52 /HPF (ref 0–2)

## 2018-02-14 PROCEDURE — 81001 URINALYSIS AUTO W/SCOPE: CPT | Performed by: SURGERY

## 2018-02-14 PROCEDURE — 80048 BASIC METABOLIC PNL TOTAL CA: CPT | Performed by: NURSE PRACTITIONER

## 2018-02-14 PROCEDURE — 25000132 ZZH RX MED GY IP 250 OP 250 PS 637: Performed by: EMERGENCY MEDICINE

## 2018-02-14 PROCEDURE — 25000128 H RX IP 250 OP 636: Performed by: NURSE PRACTITIONER

## 2018-02-14 PROCEDURE — 84100 ASSAY OF PHOSPHORUS: CPT | Performed by: NURSE PRACTITIONER

## 2018-02-14 PROCEDURE — 25000125 ZZHC RX 250: Performed by: SURGERY

## 2018-02-14 PROCEDURE — 36415 COLL VENOUS BLD VENIPUNCTURE: CPT | Performed by: NURSE PRACTITIONER

## 2018-02-14 PROCEDURE — 85027 COMPLETE CBC AUTOMATED: CPT | Performed by: NURSE PRACTITIONER

## 2018-02-14 PROCEDURE — 83735 ASSAY OF MAGNESIUM: CPT | Performed by: NURSE PRACTITIONER

## 2018-02-14 PROCEDURE — 97602 WOUND(S) CARE NON-SELECTIVE: CPT

## 2018-02-14 PROCEDURE — 25000132 ZZH RX MED GY IP 250 OP 250 PS 637: Performed by: NURSE PRACTITIONER

## 2018-02-14 PROCEDURE — 25000132 ZZH RX MED GY IP 250 OP 250 PS 637: Performed by: SURGERY

## 2018-02-14 PROCEDURE — 97165 OT EVAL LOW COMPLEX 30 MIN: CPT | Mod: GO | Performed by: OCCUPATIONAL THERAPIST

## 2018-02-14 PROCEDURE — 40000133 ZZH STATISTIC OT WARD VISIT: Performed by: OCCUPATIONAL THERAPIST

## 2018-02-14 PROCEDURE — 87086 URINE CULTURE/COLONY COUNT: CPT | Performed by: SURGERY

## 2018-02-14 RX ORDER — CIPROFLOXACIN 250 MG/1
125 TABLET, FILM COATED ORAL DAILY
Status: DISCONTINUED | OUTPATIENT
Start: 2018-02-14 | End: 2018-02-14 | Stop reason: HOSPADM

## 2018-02-14 RX ORDER — ACETAMINOPHEN 325 MG/1
975 TABLET ORAL EVERY 8 HOURS PRN
Qty: 30 TABLET | Refills: 0 | Status: ON HOLD | COMMUNITY
Start: 2018-02-14 | End: 2019-05-29

## 2018-02-14 RX ORDER — NITROFURANTOIN 25; 75 MG/1; MG/1
100 CAPSULE ORAL EVERY 12 HOURS
Qty: 14 CAPSULE | Refills: 0 | Status: SHIPPED | OUTPATIENT
Start: 2018-02-14 | End: 2018-02-15

## 2018-02-14 RX ORDER — GINSENG 100 MG
CAPSULE ORAL 2 TIMES DAILY
Status: DISCONTINUED | OUTPATIENT
Start: 2018-02-14 | End: 2018-02-14 | Stop reason: HOSPADM

## 2018-02-14 RX ORDER — ACETAMINOPHEN 325 MG/1
975 TABLET ORAL EVERY 8 HOURS PRN
Status: DISCONTINUED | OUTPATIENT
Start: 2018-02-14 | End: 2018-02-14 | Stop reason: HOSPADM

## 2018-02-14 RX ADMIN — ACETAMINOPHEN 650 MG: 325 TABLET, FILM COATED ORAL at 07:45

## 2018-02-14 RX ADMIN — PANTOPRAZOLE SODIUM 40 MG: 40 TABLET, DELAYED RELEASE ORAL at 07:45

## 2018-02-14 RX ADMIN — CIPROFLOXACIN HYDROCHLORIDE 125 MG: 250 TABLET, FILM COATED ORAL at 11:52

## 2018-02-14 RX ADMIN — CARVEDILOL 6.25 MG: 3.12 TABLET, FILM COATED ORAL at 07:45

## 2018-02-14 RX ADMIN — BACITRACIN: 500 OINTMENT TOPICAL at 07:45

## 2018-02-14 RX ADMIN — Medication 0.2 MG: at 07:00

## 2018-02-14 RX ADMIN — OLANZAPINE 5 MG: 5 TABLET, FILM COATED ORAL at 07:45

## 2018-02-14 RX ADMIN — FOLIC ACID 1 MG: 1 TABLET ORAL at 07:45

## 2018-02-14 RX ADMIN — NITROFURANTOIN (MONOHYDRATE/MACROCRYSTALS) 100 MG: 75; 25 CAPSULE ORAL at 07:45

## 2018-02-14 RX ADMIN — Medication 0.2 MG: at 04:48

## 2018-02-14 RX ADMIN — ACETAMINOPHEN 975 MG: 325 TABLET, FILM COATED ORAL at 11:53

## 2018-02-14 RX ADMIN — Medication 0.2 MG: at 10:39

## 2018-02-14 RX ADMIN — CLONIDINE HYDROCHLORIDE 0.1 MG: 0.1 TABLET ORAL at 07:45

## 2018-02-14 RX ADMIN — Medication 0.2 MG: at 02:26

## 2018-02-14 RX ADMIN — LEVOTHYROXINE SODIUM 50 MCG: 25 TABLET ORAL at 07:44

## 2018-02-14 RX ADMIN — PREDNISONE 20 MG: 20 TABLET ORAL at 07:44

## 2018-02-14 ASSESSMENT — ENCOUNTER SYMPTOMS
WOUND: 1
CONSTITUTIONAL NEGATIVE: 1
PSYCHIATRIC NEGATIVE: 1
MUSCULOSKELETAL NEGATIVE: 1
EYES NEGATIVE: 1
ENDOCRINE NEGATIVE: 1
RESPIRATORY NEGATIVE: 1
CARDIOVASCULAR NEGATIVE: 1
GASTROINTESTINAL NEGATIVE: 1

## 2018-02-14 ASSESSMENT — ACTIVITIES OF DAILY LIVING (ADL)
WHICH_OF_THE_ABOVE_FUNCTIONAL_RISKS_HAD_A_RECENT_ONSET_OR_CHANGE?: FALL HISTORY
FALL_HISTORY_WITHIN_LAST_SIX_MONTHS: YES
COGNITION: 0 - NO COGNITION ISSUES REPORTED
SWALLOWING: 0-->SWALLOWS FOODS/LIQUIDS WITHOUT DIFFICULTY
DRESS: 0-->INDEPENDENT
BATHING: 0-->INDEPENDENT
RETIRED_COMMUNICATION: 0-->UNDERSTANDS/COMMUNICATES WITHOUT DIFFICULTY
NUMBER_OF_TIMES_PATIENT_HAS_FALLEN_WITHIN_LAST_SIX_MONTHS: 1
TOILETING: 0-->INDEPENDENT
AMBULATION: 0-->INDEPENDENT
RETIRED_EATING: 0-->INDEPENDENT
TRANSFERRING: 0-->INDEPENDENT

## 2018-02-14 ASSESSMENT — VISUAL ACUITY
OU: NORMAL ACUITY

## 2018-02-14 NOTE — PLAN OF CARE
Problem: Patient Care Overview  Goal: Plan of Care/Patient Progress Review  Outcome: Adequate for Discharge Date Met: 02/14/18  Reviewed pt discharge paperwork, has no further questions. Pt left floor with all belongings via wheelchair by writer. Pt is missing glasses through all her room changes, glasses have not been found. Pt contacted patient relations. Will cont to look for them. Dawna contacted.

## 2018-02-14 NOTE — PROGRESS NOTES
[facility] Nurse Inpatient Wound Assessment     Initial  Assessment  Reason for consultation: Evaluate and treat left elbow wound     Assessment  Left elbow wound is a stage 3 pressure injury present on admission  wound  is related to a Medical Device Related Pressure Injury (MDRPI) due to IV hub while proning during her last hospitalization   Status: initial assessment    Treatment Plan  Plan of care for wound located on left elbow cleanse the wound with microklenz moisten gauze pat dry cut a piece of PolyMem dressing to fit the size of the wound and place over the wound and cover with 3x3 mepilex dressing change dressing daily and as needed if soiled     Orders Written  [facility] Nurse follow-up plan:weekly  Nursing to notify the Provider(s) and re-consult the WO Nurse if wound(s) deteriorates or new skin concern.    Patient History  According to provider note(s):  Ana Laura Wharton is a 47 year old female who was recently hospitalized 12/13/17 to 01/05/2018 with complications from an intentional overdose with subsequent ARDS. She presents today with worsening headache and disorientation after she slipped and feel at her home last evening. She states sheslipped on some water, fell and hit her head around the right temple area. She states 911 was called and she was feeling OK so she refused transport. As time went on patient developed a headache, visual hallucinations, and she had trouble working her cell phone and felt disoriented. 911 was called again and she was transported to [facility]. She was found to be quite hypertensive, later placed on a Nicardipine infusion and transferred here for further cares. Of note she states she has not taken her medications including her blood pressure mediations since Sunday morning because she has been feeling down. She denies any chest discomfort, other recent trauma, lightheaded, fever, shortness of breath or trouble ambulating. She denies any urinary or respiratory  symptoms.     Objective Data  Containment of urine/stool: continent of bowel and bladder     Active Diet Order    Active Diet Order      Regular Diet Adult    Output:   I/O last 3 completed shifts:  In: 240 [P.O.:240]  Out: -     Risk Assessment:    Sujit Sujit Score  Av.5  Min: 20  Max: 22                            Labs:   Recent Labs  Lab 18  1826 18  1343 18  0543   HGB  --  14.4 15.4   INR 1.04  --   --    WBC  --   --  16.1*           Recent Labs  Lab 18  0700 18  2124 18  2120 18  1435   CULT PENDING No growth after 8 hours No growth after 8 hours Culture in progress       Physical Exam  Skin assessment:   Focused skin inspection: left elbow         ate of last photo 2018  Wound History: A stage 3 pressure injury present on admission. The injury is from the IV hub while patient was prone during her last hospitalization. At home she uses bacitracin and dry dressing with no improvement to the wound.   Measurements (length x width x depth, in cm) 2 cm x 1.7 cm  x  0.5 cm   Wound Base: mixed wound base with 20% slough and 80% clean granular wound base   Tunneling N/A  Undermining up to 1 cm  at 9-12 o'clock  Palpation of the wound bed: normal   Periwound skin: erythema  Color: pink  Temperature: normal   Drainage:, small  Description of drainage: serosanguinous  Odor: none  Pain: , aching    Interventions  Current support surface: Standard  Atmos Air mattress  Visual inspection of wound(s) completed  Wound Care: done per plan of care    Supplies: at bedside  Education provided today: on wound care instructions      Discussed plan of care with Patient    Face to face time: 25 minutes  Chely HICKSN, BSN, RN, BA

## 2018-02-14 NOTE — PLAN OF CARE
"Problem: Pain, Acute (Adult)  Goal: Identify Related Risk Factors and Signs and Symptoms  Related risk factors and signs and symptoms are identified upon initiation of Human Response Clinical Practice Guideline (CPG).   D/I: patient arrived to [facility]A from [facility] at 1800 for suspected microhemorrhages from fall sustained last evening.  Neuro- lethargic, HA that is mild in character and relieved well with tylenol  CV- WNL, pt has SBP goal of less than 140, no need for prn antihypertensives since arrival to .  Pulm- LS clear, on RA with sats %  GI- Tolerated regular diet  - pt voided in ED multiple times, no voids since she arrived. Pt is continent  Skin- intact ex L forearm wound that is being managed with WOC since last admission to the hospital last week per pt. Site is from an \"IV that went bad\"  Gtts- SL'd PIV  ID- n/a  Labs- triggered Lactic Acid upon arrival to , level was 2.3, MD notified, vitally stable.  Pain- HA managed effectively with PO tylenol  Activity- up with SBA, pt reports being dizzy and seeing black when walking, commode ordered for bedside access tonight.  Drains- n/a  Social- pt states she lives alone, not with a spouse  CRRT- n/a  See flow sheets for further details and assessments.  A: pt remains lethargic but arouses easily to voice or light touch, answers questions appropriately. Hx of bipolar, anxiety, depression noted in chart.  P: continue to monitor, notify MD of significant changes.        "

## 2018-02-14 NOTE — PLAN OF CARE
"Problem: Patient Care Overview  Goal: Plan of Care/Patient Progress Review  Discharge Planner OT   Patient plan for discharge: Home, patient reports lives alone  Current status: Patient seen for OT eval only for cognitive screen.  Patient scored 25/30 on Ambia Cognitive Assessment (MOCA)- score of 26 and above indicates normal cognition.  Patient reports no further visual concerns.  Patient reports pain in head \"I have a headache and my head hurts\".  Patient reports no concerns with ambulation, reports has been up walking and took a shower this morning.  Barriers to return to prior living situation: None  Recommendations for discharge: Home when medically ready.  Rationale for recommendations: No further OT needs, will complete orders.           Entered by: Natividad Diego 02/14/2018 11:57 AM           "

## 2018-02-14 NOTE — PROGRESS NOTES
Interval Note  Re-started end of prednisone taper as well as home anxiolytics and hypertension medications.    Neville Carrillo MD  [facility]

## 2018-02-14 NOTE — DISCHARGE SUMMARY
[facility], [place]    Discharge Summary  Trauma Surgery Service    Date of Admission:  2/13/2018  Date of Discharge:  2/14/2018  2:27 PM  Discharging Provider: Mihai Major CNP/Dr. Squires  Date of Service (when I saw the patient): 02/14/18    Primary Provider: Ander Fuchs  Primary Care clinic: [facility] [place] [facility] [address]  [address]  [place] [address]  Phone: [phone]  Fax number: [phone]     Discharge Diagnoses   Known Injuries:  1. Hemorrhagic contusion of the brain  2. Extensive edema of the posterior brain- PRESS  Other diagnoses:   1. Encephalopathy  2. Essential Hypertension  3. Bipolar disorder  4. GERD  5. Depression and Anxiety  6. Nicotine dependence-started smoking 2 months ago, smokes 1PPD   7. Hx suicidal recent attempts 03/17 and 12/17  8. HX frequent UTI's  9. Left upper arm pressure ulcer        Hospital Course   History of Present Illness: Ana Laura Wharton is a 47 year old female who was recently hospitalized 12/13/17 to 01/05/2018 with complications from an intentional overdose with subsequent ARDS. She presents today with worsening headache and disorientation after she slipped and feel at her home last evening. She states sheslipped on some water, fell and hit her head around the right temple area. She states 911 was called and she was feeling OK so she refused transport. As time went on patient developed a headache, visual hallucinations, and she had trouble working her cell phone and felt disoriented. 911 was called again and she was transported to [facility]. She was found to be quite hypertensive, later placed on a Nicardipine infusion and transferred here for further cares. Of note she states she has not taken her medications including her blood pressure mediations since Sunday morning because she has been feeling down. She denies any chest discomfort, other recent trauma, lightheaded, fever, shortness of breath or trouble ambulating. She denies  any urinary or respiratory symptoms.    Traumatic Injury/  Posterior Reversible Leukoencephalopathy Syndrome (PRESS)  Ms Wharton was admitted for further evaluation after a traumatic fall with progressive headache and altered mental status. Further imaging indicated microhemmorhages and indications of PRESS. It was felt that given her history and imaging was more supportive of PRESS than traumatic injury. Precipitants of this condition, most frequently are uncontrolled hypertension, renal disease, and immunosuppressive therapy. Her home blood pressure medications were resumed and she underwent serial neurological exams which remained stable. She was discharged on her home regimen and is recommended to see her primary care physician in 3 to 5 days to discuss appropraite weaning off Clonidine. It is felt that Clonidine for blood pressure control is not an ideal medication for this patient who was not complaint on her current regimen and should be switched to an alternate regimen. She was seen by the Trauma team and injury prevention education was performed.  The mechanism of injury and factors contributing to the accident were discussed with the patient.  Strategies on how to prevent future accidents were reviewed.  The patient underwent tertiary examination to evaluate for additional injuries.  The systematic review did not find any other injuries.  She underwent a cognitive evaluation prior to discharge, see below for the recommendations.    At the time of discharge Ms. Wharton had a safe discharge plan. She denied any suicidal/homicidal ideation. She is routine seen at her primary care clinic for wound cares and she is due to see her Psychiatrist in April 2018. She states she has friends who are very supportive and able to provide any assistance she may need.    Urinary tract infection:  A urine analysis indicated a large amount of leukocytes and few WBC's. She is known to have frequent UTI's and is on a chronic  "preventative Ciprofloxacin 125 mg, which was resumed. She was given a 5 day course of Macrobid and should follow up with her primary care physician as prior.    Lactic acidosis  She had a mid ly elevated lactic acid level during her stay. It was not felt this mild elevation was mostly related to her injury. She remains afebrile with no other indications of an acute infection. Blood cultures were obtained with no growth prior to discharge.  Therapy Recommendations:    Current status of occupational therapies on discharge:  Patient plan for discharge: Home, patient reports lives alone  Current status: Patient seen for OT eval only for cognitive screen.  Patient scored 25/30 on Jefferson Cognitive Assessment (MOCA)- score of 26 and above indicates normal cognition.  Patient reports no further visual concerns.  Patient reports pain in head \"I have a headache and my head hurts\".  Patient reports no concerns with ambulation, reports has been up walking and took a shower this morning.  Barriers to return to prior living situation: None  Recommendations for discharge: Home when medically ready.  Rationale for recommendations: No further OT needs, will complete orders.    Pending Results   These results will be followed up by Primary Care physician  Unresulted Labs Ordered in the Past 30 Days of this Admission     Date and Time Order Name Status Description    2/14/2018 0700 Urine Culture Aerobic Bacterial Preliminary     2/13/2018 1847 Blood culture Preliminary     2/13/2018 1847 Blood culture Preliminary     2/13/2018 1435 Urine Culture Aerobic Bacterial Preliminary         Subjective exam:  Ms Wharton was seen on the day of discharge. She states her headache had improved. She denied any neurological symptoms. She was tolerating her diet, ambulating without difficulty, and  denied feeling lightheaded or dizzy. We talked about the discharge plan and she was agreeable to the plan.    Objective Data:  Blood pressure 110/66, " pulse 75, temperature 96.5  F (35.8  C), temperature source Oral, resp. rate 16, weight 52.2 kg (115 lb), last menstrual period 01/13/2018, SpO2 99 %.  Physical Exam   Constitutional: She is oriented to person, place, and time. She appears well-developed and well-nourished. No distress.   HENT:   Head: Normocephalic and atraumatic.   Eyes: Conjunctivae and EOM are normal. Pupils are equal, round, and reactive to light.   Neck: Normal range of motion. Neck supple.   Cardiovascular: Normal rate and normal heart sounds.    Pulmonary/Chest: Effort normal and breath sounds normal. No respiratory distress. She has no wheezes.   Abdominal: Soft. Bowel sounds are normal.   Musculoskeletal: Normal range of motion.   Neurological: She is alert and oriented to person, place, and time. No cranial nerve deficit. Coordination normal.   Skin: Skin is warm and dry. She is not diaphoretic.   Left upper arm wound with dry dressing   Psychiatric: She has a normal mood and affect. Her behavior is normal.     Code Status   Full Code    Discharge Disposition   Discharged to home  Condition at discharge: Stable  Discharge VS: Blood pressure 147/85, pulse 75, temperature 97  F (36.1  C), temperature source Oral, resp. rate 16, weight 52.2 kg (115 lb), last menstrual period 01/13/2018, SpO2 99 %.    Consultations This Hospital Stay   NEUROLOGY GENERAL ADULT IP CONSULT  NEUROSURGERY ADULT IP CONSULT  VASCULAR ACCESS CARE ADULT IP CONSULT  WOUND OSTOMY CONTINENCE NURSE  IP CONSULT  OCCUPATIONAL THERAPY ADULT IP CONSULT    Discharge Orders     Reason for your hospital stay   1. Reversible posterior leukoencephalopathy syndrome  2. Traumatic fall with possible traumatic brain injury     Activity   Your activity upon discharge: activity as tolerated     Wound care and dressings   Instructions to care for your wound at home: daily dressing changes as previously directed with routine follow up by you primary care physician.     Adult [facility]  Follow-up and recommended labs and tests   1. Follow up with your primary care provider (Dr. Fuchs) for continued medical care and hospital follow up in 3-5 days to discuss tapering off Clonidine and continued wound care    Medication Therapy Management Services  If you have any questions regarding your medications after discharge, this service is available to you.  Please call:  [phone] or [phone] (toll-free)  [email or web address]  [address] [address]  [email or web address]  [email or web address]    2. Neurology Clinic: with any available provider in 1 month with repeat MRI prior to visit  [facility]  [address]   [address] [place] [address]   Appointments: [phone]       Appointments on [place] and/or [facility] (with [facility] or [facility] provider or service). Call [phone] if you haven't heard regarding these appointments within 7 days of discharge.     Discharge Instructions   Please take your medications daily as prescribed, do not skip any doses, if you have to skip a dose please call your primary care physicians for advice.    Please check your blood pressure twice a day at home, record the numbers and present to your primary care doctor during your next visit.     Full Code     Diet   Follow this diet upon discharge: Regular       Discharge Medications   Discharge Medication List as of 2/14/2018  2:10 PM      START taking these medications    Details   acetaminophen (TYLENOL) 325 MG tablet Take 3 tablets (975 mg) by mouth every 8 hours as needed for mild pain or fever, Disp-30 tablet, R-0, OTC      nitroFURantoin, macrocrystal-monohydrate, (MACROBID) 100 MG capsule Take 1 capsule (100 mg) by mouth every 12 hours, Disp-14 capsule, R-0, E-Prescribe         CONTINUE these medications which have NOT CHANGED    Details   cloNIDine (CATAPRES) 0.1 MG tablet Take 0.1 mg by mouth 2 times daily, Historical      predniSONE (DELTASONE) 10 MG  "tablet Take by mouth daily 30mg x 3 days then 20mg x 3 days then 10mg x 3 days then stop, Historical      ciprofloxacin (CIPRO) 250 MG tablet Take 125 mg by mouth daily For UTI prevention, Historical      OLANZapine (ZYPREXA) 5 MG tablet Take 1 tablet (5 mg) by mouth 2 times daily, Disp-60 tablet, R-1, E-Prescribe      hydrOXYzine (VISTARIL) 50 MG capsule Take 1 capsule (50 mg) by mouth 2 times daily as needed for anxiety, Disp-60 capsule, R-1, E-Prescribe      clonazePAM (KLONOPIN) 1 MG tablet Take 1 tablet (1 mg) by mouth 2 times daily as needed for anxiety, Disp-60 tablet, R-1, No Print OutCalled into pharmacy:  LVM on pharmacy line      carvedilol (COREG) 6.25 MG tablet 1 tablet (6.25 mg) by Oral or Feeding Tube route 2 times daily, Disp-60 tablet, Transitional      folic acid (FOLVITE) 1 MG tablet Take 1 tablet (1 mg) by mouth daily, Disp-30 tablet, Transitional      mirtazapine (REMERON) 15 MG tablet Take 1 tablet (15 mg) by mouth At Bedtime, Disp-30 tablet, Transitional      IBUPROFEN PO Take 400-600 mg by mouth every 6 hours as needed for moderate pain, Historical      Levothyroxine Sodium (SYNTHROID PO) Take 50 mcg by mouth daily, Historical      Ondansetron (ZOFRAN ODT PO) Take 4 mg by mouth every 8 hours as needed for nausea, Historical      Pantoprazole Sodium (PROTONIX PO) Take 40 mg by mouth every morning (before breakfast) , Historical           Allergies   Allergies   Allergen Reactions     Amoxicillin-Pot Clavulanate      PN: LW Reaction: Itching, Pruritis     Azithromycin      Other reaction(s): Dermatitis     Cephalexin      PN: LW Reaction: Itching, Pruritis     Ciprofloxacin Other (See Comments)     Joint pain cdiff     Compazine [Prochlorperazine] Other (See Comments)     dystonia     Metoclopramide Other (See Comments)     \"I feel like I am crawling out of my skin\"     Minocycline Nausea and Vomiting     PN: DIARRHEA, VOMITING, AND STOMACH CRAMPS     Penicillins Itching     Reglan " "[Metoclopramide Hcl] Other (See Comments)     Sensation of \"crawling out of skin\"     Restoril [Temazepam]      Thorazine [Chlorpromazine] Other (See Comments)     dystonia     Abilify [Aripiprazole] Rash     Lamotrigine Rash     Severe drug rash - contraindication to receiving again. Skin peeling.      Sulfa Drugs Rash     Data   Most Recent 3 CBC's:  Recent Labs   Lab Test  02/14/18   0955  02/13/18   1343  02/13/18   0543  01/04/18   0618   WBC  12.5*   --   16.1*  10.9   HGB  13.5  14.4  15.4  9.3*   MCV  100   --   96  104*   PLT  232   --   324  345      Most Recent 3 BMP's:  Recent Labs   Lab Test  02/14/18   0955  02/13/18   0543  01/04/18   0618   NA  132*  135  135   POTASSIUM  4.3  3.6  3.5   CHLORIDE  99  100  100   CO2  25  28  24   BUN  16  14  14   CR  0.60  0.52  0.59   ANIONGAP  8  7  11   ISAURO  9.0  8.9  9.7   GLC  200*  108*  162*       Most Recent INR's and Anticoagulation Dosing History:  Anticoagulation Dose History     Recent Dosing and Labs Latest Ref Rng & Units 12/19/2017 12/22/2017 12/27/2017 1/1/2018 1/3/2018 1/5/2018 2/13/2018    INR 0.86 - 1.14 1.73(H) 1.53(H) 1.09 1.07 1.04 1.10 1.04        Most Recent 3 Troponin's:  Recent Labs   Lab Test  02/13/18   0543  12/16/17   0505  12/16/17   0205   TROPI  <0.015  0.074*  0.102*     Most Recent 6 Bacteria Isolates From Any Culture (See EPIC Reports for Culture Details):  Recent Labs   Lab Test  02/14/18   0700  02/13/18   2124  02/13/18   2120  02/13/18   1435  01/02/18   2226  01/02/18   2223   CULT  PENDING  No growth after 16 hours  No growth after 16 hours  Culture in progress  No growth  No growth       Results for orders placed or performed during the hospital encounter of 02/13/18   Chest  XR, 1 view portable    Narrative    XR CHEST PORT 1 VW  2/13/2018 12:30 PM      HISTORY: head injury;      COMPARISON: Chest radiograph 1/2/2018    FINDINGS: Normal cardiac mediastinal silhouette and pulmonary  vasculature. Improvement in bilateral " lung opacities Resolution of  previously seen bilateral pulmonary opacities. No substantial pleural  effusion. No pneumothorax. Left apical pleural thickening, similar to  prior exam.  The visualized upper abdomen is unremarkable.      Impression    IMPRESSION:   1. Improvement in patchy lung opacities.  2. Continued bilateral pleural thickening.    I have personally reviewed the examination and initial interpretation  and I agree with the findings.    ESTEE ARCINIEGA MD     MRI Brain  MRI BRAIN WITHOUT AND WITH CONTRAST  2/13/2018 7:58 AM     HISTORY:  Bilateral occipital edema on CT. Evaluate for PRES.   Headache after falling last night. Severe hypertension on arrival to  the emergency department. Patient on Lovenox.     TECHNIQUE:  Multiplanar, multisequence MRI of the brain without and  with 5 mL Gadavist.     COMPARISON: CT head today.     FINDINGS: T2 FLAIR images demonstrate multiple foci of prolonged T2  relaxation in the white matter and minimally in the overlying cortex  of both occipital poles, in the white matter more superiorly in the  occipital and parietal lobes bilaterally and fairly symmetrically, in  the splenium and posterior body of the corpus callosum and in the  anterior right thalamus, left corona radiata and upper left putamen.  Other small foci of prolonged T2 relaxation are seen in the right side  of the [place]ol[place] and left cerebellar hemisphere posteriorly and centrally  as well as in the central white matter of the left anterior temporal  lobe. Gradient echo images demonstrate microhemorrhages in the left  side of the posterior body of the corpus callosum and in the left  occipital lobe abnormality, but no focal hematoma is seen. Right  lateral frontal and anterior parietal scalp edema is noted from the  recent trauma.     The facial structures appear normal. The arteries at the base of the  brain and the dural venous sinuses appear patent.          IMPRESSION:    1. Extensive foci of edema  in the brain mainly posteriorly but also  involving central structures, left cerebellar hemisphere and left  temporal lobe white matter and portions of the basal ganglia as  described above. The distribution is typical of posterior reversible  encephalopathy syndrome, particularly in light of the patient's  episode of severe hypertension.  2. Microhemorrhages within the left occipital pole and in the left  side of the posterior body of the left corpus callosum are most likely  related to the same posterior reversible encephalopathy syndrome, but  in light of the right frontal trauma, the possibility of hemorrhagic  contusions should also be considered as an etiology for the  microhemorrhages. Followup MRI in a few days is suggested to be sure  the areas of edema are resolving.     MARY CERNA MD    Time Spent on this Encounter   I, Mihai Major, personally saw the patient today and spent greater than 30 minutes discharging this patient.    We appreciate the opportunity to care for your patient while in the hospital.  Should you have any questions about their injuries or this discharge summary our contact information is below.    Trauma Services  [facility]   Department of Critical Care and Acute Care Surgery  [address] [place] [id number]  Office: [phone]

## 2018-02-14 NOTE — PROGRESS NOTES
[facility], [place]    Sepsis Evaluation Progress Note    Date of Service: 02/13/2018    I was called to see Ana Laura Wharton due to lactate of 2.3. She is not known to have an infection.     Subjective:  Patient feels no change. Denies hematuria, dysuria or urinary urgency. Has had intermittent fevers and chills the past couple of days. States she has history of recurrent UTIs usually treated with cipro. Complains of mild HA. No nausea or vomiting. Tolerating PO.    Physical Exam    Vital Signs:  Temp: 98.3  F (36.8  C) Temp src: Oral BP: 127/87   Heart Rate: 67 Resp: 20 SpO2: 99 % O2 Device: None (Room air) Oxygen Delivery: 2 LPM    Lab:  Lactate 2.3  WBC 16.1  UA slightly cloudy, +protein, Small LE, WBC, few bacteria and mucous    The patient has no signs of altered consciousness.    The rest of their physical exam is significant for:  AAOx3  Nonlabored breathing on RA  RRR  Soft, ND, NTTP  LLE with open wound on lateral elbow small dry necrotic lesion in middle. No bogginess, no drainage, no pus, no induration, min TTP    Assessment and Plan    The SIRS and exam findings are likely due to dehydration and UTI, there is no sign of sepsis at this time.    Disposition: The patient will remain on the current unit. We will continue to monitor this patient closely.  Patient appears to have UTI with dirty UA, hx of recurrent UTI and elevated WBC   Will give 1L bolus and start nitrofurantoin 100mg BID x5 days      Neville Carrillo MD

## 2018-02-14 NOTE — PROGRESS NOTES
[facility], [place]    Trauma Service Tertiary Survey     Date of Service: 02/14/2018  Trauma mechanism:Fall from standing  Time/date of injury:02/12/2018 about 7pm  Known Injuries:  1. Hemorrhagic contusion of the brain  2. Extensive edema of the posterior brain- PRESS  Other diagnoses:   1. Encephalopathy  2. Essential Hypertension  3. Bipolar disorder  4. GERD  5. Depression and Anxiety  6. Nicotine dependence-started smoking 2 months ago, smokes 1PPD   7. Hx suicidal recent attempts 03/17 and 12/17  8. HX frequent UTI's  9. Left upper arm pressure ulcer     Procedure: None      Plan:  1. Tertiary exam completed today with no new injuries noted.  2. Neurology: Question if the etiology of these microhemorrhages are as a result of the fall or uncontrolled/poorly controlled hypertension.This patient has not been compliant with her antihypertensive regimen. She states she has not taken any of her scheduled medications in > 48 hours. No documented seizure like activity overnight nor PTA.  1. Consulted Neurology- Recommend good BP control, follow up MRI in 1 month, no need for anti seizure medications at this time  2. Neurosurgery consulted: not felt to be a traumatic bleed at this time, recommend better BP control.  3. Routine neurochecks  4. Strict BP control  3. Cardiac: Severe hypertensive episode on admission most likely related to sympathetic overactivity from noncompliance with medication regimen. Clonidine would seem to be a poor choice for BP control in this patient at this time. Will require a slow taper off and maintain other agents or transition to an alternate agent for BP control.  4. Respiratory: No acute issues, complete prednisone taper  5. Psych: Continue PTA meds  6. Left arm pressure ulcer- WOCN consulted  7. GI/: regular diet. UA with dirty urine, asymptomatic, afebrile, WBC downtrending patient with known history of frequent UTI's on chronic PTA Cipro,  Continue  Macrobid x 5 days.  8. PT/OT when appropriate- OT for cog screen today     Code status: Full confirmed with code.      General Cares:  GI Prophylaxis: Regular diet, resume PTA Protonix  DVT Prophylaxis: mechanical   Date of last stool/Bowel Regimen:PTA/ ordered  Pulmonary toilet:Cough and deep breath    Disposition: DC today or tomorrow pending cognitive screen and safe DC plan    ETOH: Ana Laura Wharton was asked if in the last 3-6 months there has been a time when she had 4 or more drinks in a single day/outing.. Patient answer to the screening question was in the negative. No intervention needed..    SUBJECTIVE: Ana Laura Wharton is a 47 year old female who was recently hospitalized 12/13/17 to 01/05/2018 with complications from an intentional overdose with subsequent ARDS. She presents today with worsening headache and disorientation after she slipped and feel at her home last evening. She states sheslipped on some water, fell and hit her head around the right temple area. She states 911 was called and she was feeling OK so she refused transport. As time went on patient developed a headache, visual hallucinations, and she had trouble working her cell phone and felt disoriented. 911 was called again and she was transported to [facility]. She was found to be quite hypertensive, later placed on a Nicardipine infusion and transferred here for further cares. Of note she states she has not taken her medications including her blood pressure mediations since Sunday morning because she has been feeling down. She denies any chest discomfort, other recent trauma, lightheaded, fever, shortness of breath or trouble ambulating. She denies any urinary or respiratory symptoms.  Review of Systems   Constitutional: Negative.    HENT: Negative.    Eyes: Negative.    Respiratory: Negative.    Cardiovascular: Negative.    Gastrointestinal: Negative.    Endocrine: Negative.    Genitourinary: Negative.    Musculoskeletal: Negative.   "  Skin: Positive for wound.        Left upper arm pressure wound present on admission   Psychiatric/Behavioral: Negative.         \" states she felt down the last couple of days\"       OBJECTIVE:  Blood pressure 110/66, pulse 75, temperature 96.5  F (35.8  C), temperature source Oral, resp. rate 16, weight 52.2 kg (115 lb), last menstrual period 01/13/2018, SpO2 99 %.  Physical Exam   Constitutional: She is oriented to person, place, and time. She appears well-developed and well-nourished. No distress.   HENT:   Head: Normocephalic and atraumatic.   Eyes: Conjunctivae and EOM are normal. Pupils are equal, round, and reactive to light.   Neck: Normal range of motion. Neck supple.   Cardiovascular: Normal rate and normal heart sounds.    Pulmonary/Chest: Effort normal and breath sounds normal. No respiratory distress. She has no wheezes.   Abdominal: Soft. Bowel sounds are normal.   Musculoskeletal: Normal range of motion.   Neurological: She is alert and oriented to person, place, and time. No cranial nerve deficit. Coordination normal.   Skin: Skin is warm and dry. She is not diaphoretic.   Left upper arm wound with dry dressing   Psychiatric: She has a normal mood and affect. Her behavior is normal.       ROUTINE LABS: (Last four results)  CMP  Recent Labs  Lab 02/14/18  0955 02/13/18  0543   * 135   POTASSIUM 4.3 3.6   CHLORIDE 99 100   CO2 25 28   ANIONGAP 8 7   * 108*   BUN 16 14   CR 0.60 0.52   GFRESTIMATED >90 >90   GFRESTBLACK >90 >90   ISAURO 9.0 8.9   MAG 2.0  --    PHOS 3.3  --      CBC  Recent Labs  Lab 02/14/18  0955 02/13/18  1343 02/13/18  0543   WBC 12.5*  --  16.1*   RBC 4.11  --  4.76   HGB 13.5 14.4 15.4   HCT 41.2  --  45.9     --  96   MCH 32.8  --  32.4   MCHC 32.8  --  33.6   RDW 13.6  --  13.5     --  324     INR  Recent Labs  Lab 02/13/18  1826   INR 1.04     Arterial Blood GasNo lab results found in last 7 days.    RADIOLOGY:  All radiology reviewed.    Mihai LYNN" AKILAH Major CNP

## 2018-02-14 NOTE — PLAN OF CARE
Problem: Patient Care Overview  Goal: Plan of Care/Patient Progress Review  Outcome: No Change  Pt on 6A s/p reversible posterior leukoencephalopathy syndrome s/t fall at home. Pt AVSS. A&Ox4. Neuro's intact. Pt c/o constant throbbing/achy headache pain to the R side of her head. Pt states her pain has worsened significantly overnight; Charge RN updated. Pt managing headache with prn IV dilaudid (see MAR), ice packs, and rest. Pt does state her dizziness has improved overnight. Pt now feels stable to ambulate. Up with SBA. Pt has wound to LUE from an infiltrated IV site during prior admission. Pt dressing changed last evening per pt home plan of care; change TID. Pt voiding; receiving macrobid for UTI. Regular diet.  PIV SL. Continue POC.

## 2018-02-14 NOTE — PLAN OF CARE
Problem: Patient Care Overview  Goal: Plan of Care/Patient Progress Review  Outcome: Improving  Pt on 6A s/p reversible posterior leukoencephalopathy syndrome s/t fall at home d/t hypertension that caused edema in posterior brain. VSS, RA. A&Ox4. Neuro's intact. Pt c/o constant headache pain, given ice packs, Tylenol and rest. Pt denies and n/t, dizziness this shift. Up SBA. Shower completed per pt. Pt has wound to LUE from an infiltrated IV site during prior admission, bacitracin and dry gauze in place, CDI, change BID. Pt voiding; receiving macrobid and now Cipro for UTI. Regular diet, fair intake. PIV SL. Plan to d/c home today or tomorrow. Lost glasses in transition at some time, unable to find them, given Pt relations number. Continue POC.

## 2018-02-14 NOTE — PROGRESS NOTES
02/14/18 1152   Quick Adds   Type of Visit Initial Occupational Therapy Evaluation   Living Environment   Lives With alone   Self-Care   Usual Activity Tolerance good   Current Activity Tolerance moderate   Equipment Currently Used at Home none   Activity/Exercise/Self-Care Comment Patient independent in self-cares/mobility without AE.   Functional Level Prior   Ambulation 0-->independent   Transferring 0-->independent   Toileting 0-->independent   Bathing 0-->independent   Dressing 0-->independent   Eating 0-->independent   Communication 0-->understands/communicates without difficulty   Swallowing 0-->swallows foods/liquids without difficulty   Cognition 0 - no cognition issues reported   Fall history within last six months yes   Number of times patient has fallen within last six months 1   Prior Functional Level Comment Patient independent in self-cares/mobility without AE.   General Information   Onset of Illness/Injury or Date of Surgery - Date 02/13/18   Referring Physician Dr. Maier   Patient/Family Goals Statement did not verbalize   Additional Occupational Profile Info/Pertinent History of Current Problem Per chart on 2/13/18: Ms Wharton is a 47 year old woman with known h/o bipolar disorder, migraines, hypertension, hypothyroid, recurrent UTIs, chronic C. Dif, and recent overdose attempt who presents with a one day history of fall with visual changes. Yesterday at about 7 pm, she states that she slipped in a puddle of water on a concrete floor and fell. Immediately before falling, she states that she felt dizzy, clumsy, weak and disoriented. After falling, she remained on the floor for approximately an hour, after which she got up and had a headache. She denies any loss of consciousness before or after the fall. She took two relpax which may have made her headache worse. She began to have visual changes described as spots, lines, and glowing areas in her vision, as well as ringing in her ears. She tried to  "call the paramedics but she \"couldn't figure out the phone\". Eventually she called the paramedics, who saw her and did not think she needed to be brought in. Later that night, she felt anxious and unable to sleep, and took her BP at home, and thought the SBP was in the 300s. She had not been drinking or taking any drugs or new medications, and she has not had anything like this happen before. She states that she drinks 2 glasses of wine/week and smokes marijuana 1-2/month, does not use any other drugs. She has not taken her clonidine or any of her other medications since 2/11 due to feeling down. She was hospitalized 12/13/17-1/5/18 for attempted suicide by overdose with amlodipine, nyquil, acetaminophen, and vodka, and was admitted for ARDS, shock, sepsis   Precautions/Limitations no known precautions/limitations   General Observations In bed, gown very unorganized/buttoned wrong   Cognitive Status Examination   Orientation orientation to person, place and time   Level of Consciousness alert   Able to Follow Commands WNL/WFL   Sensory Examination   Sensory Comments No N/T noted   Pain Assessment   Patient Currently in Pain Yes, see Vital Sign flowsheet   Mobility   Bed Mobility Bed mobility skill: Sit to supine;Bed mobility skill: Supine to sit   Bed Mobility Skill: Sit to Supine   Level of [place]: Sit/Supine independent   Bed Mobility Skill: Supine to Sit   Level of [place]: Supine/Sit independent   Transfer Skills   Transfer Comments Patient/nursing report no concerns with ambulation   Toilet Transfer   Toilet Transfer Comments Patient/nursing report no concerns with ambulation   Upper Body Dressing   Level of [place]: Dress Upper Body independent   Lower Body Dressing   Level of [place]: Dress Lower Body independent   Toileting   Level of [place]: Toilet independent   Grooming   Level of [place]: Grooming independent   Eating/Self Feeding   Level of [place]: Eating independent " "  Activities of Daily Living Analysis   Impairments Contributing to Impaired Activities of Daily Living pain   Clinical Impression   Criteria for Skilled Therapeutic Interventions Met evaluation only   Anticipated Discharge Disposition Home with Assist   Risks and Benefits of Treatment have been explained. Yes   Patient, Family & other staff in agreement with plan of care Yes   [facility] TM \"6 Clicks\"   2016, Trustees of [facility], under license to [employer].  All rights reserved.   6 Clicks Short Forms Daily Activity Inpatient Short Form   [facility]  \"6 Clicks\" Daily Activity Inpatient Short Form   1. Putting on and taking off regular lower body clothing? 4 - None   2. Bathing (including washing, rinsing, drying)? 4 - None   3. Toileting, which includes using toilet, bedpan or urinal? 4 - None   4. Putting on and taking off regular upper body clothing? 4 - None   5. Taking care of personal grooming such as brushing teeth? 4 - None   6. Eating meals? 4 - None   Daily Activity Raw Score (Score out of 24.Lower scores equate to lower levels of function) 24   Total Evaluation Time   Total Evaluation Time (Minutes) 15        02/14/18 1152   Quick Adds   Type of Visit Initial Occupational Therapy Evaluation   Living Environment   Lives With alone   Self-Care   Usual Activity Tolerance good   Current Activity Tolerance moderate   Equipment Currently Used at Home none   Activity/Exercise/Self-Care Comment Patient independent in self-cares/mobility without AE.   Functional Level Prior   Ambulation 0-->independent   Transferring 0-->independent   Toileting 0-->independent   Bathing 0-->independent   Dressing 0-->independent   Eating 0-->independent   Communication 0-->understands/communicates without difficulty   Swallowing 0-->swallows foods/liquids without difficulty   Cognition 0 - no cognition issues reported   Fall history within last six months yes   Number of times patient has " "fallen within last six months 1   Prior Functional Level Comment Patient independent in self-cares/mobility without AE.   General Information   Onset of Illness/Injury or Date of Surgery - Date 02/13/18   Referring Physician Dr. Maier   Patient/Family Goals Statement did not verbalize   Additional Occupational Profile Info/Pertinent History of Current Problem Per chart on 2/13/18: Ms Wharton is a 47 year old woman with known h/o bipolar disorder, migraines, hypertension, hypothyroid, recurrent UTIs, chronic C. Dif, and recent overdose attempt who presents with a one day history of fall with visual changes. Yesterday at about 7 pm, she states that she slipped in a puddle of water on a concrete floor and fell. Immediately before falling, she states that she felt dizzy, clumsy, weak and disoriented. After falling, she remained on the floor for approximately an hour, after which she got up and had a headache. She denies any loss of consciousness before or after the fall. She took two relpax which may have made her headache worse. She began to have visual changes described as spots, lines, and glowing areas in her vision, as well as ringing in her ears. She tried to call the paramedics but she \"couldn't figure out the phone\". Eventually she called the paramedics, who saw her and did not think she needed to be brought in. Later that night, she felt anxious and unable to sleep, and took her BP at home, and thought the SBP was in the 300s. She had not been drinking or taking any drugs or new medications, and she has not had anything like this happen before. She states that she drinks 2 glasses of wine/week and smokes marijuana 1-2/month, does not use any other drugs. She has not taken her clonidine or any of her other medications since 2/11 due to feeling down. She was hospitalized 12/13/17-1/5/18 for attempted suicide by overdose with amlodipine, nyquil, acetaminophen, and vodka, and was admitted for ARDS, shock, sepsis " "  Precautions/Limitations no known precautions/limitations   General Observations In bed, gown very unorganized/buttoned wrong   Cognitive Status Examination   Orientation orientation to person, place and time   Level of Consciousness alert   Able to Follow Commands WNL/WFL   Sensory Examination   Sensory Comments No N/T noted   Pain Assessment   Patient Currently in Pain Yes, see Vital Sign flowsheet   Mobility   Bed Mobility Bed mobility skill: Sit to supine;Bed mobility skill: Supine to sit   Bed Mobility Skill: Sit to Supine   Level of [place]: Sit/Supine independent   Bed Mobility Skill: Supine to Sit   Level of [place]: Supine/Sit independent   Transfer Skills   Transfer Comments Patient/nursing report no concerns with ambulation   Toilet Transfer   Toilet Transfer Comments Patient/nursing report no concerns with ambulation   Upper Body Dressing   Level of [place]: Dress Upper Body independent   Lower Body Dressing   Level of [place]: Dress Lower Body independent   Toileting   Level of [place]: Toilet independent   Grooming   Level of [place]: Grooming independent   Eating/Self Feeding   Level of [place]: Eating independent   Activities of Daily Living Analysis   Impairments Contributing to Impaired Activities of Daily Living pain   Clinical Impression   Criteria for Skilled Therapeutic Interventions Met evaluation only   Anticipated Discharge Disposition Home with Assist   Risks and Benefits of Treatment have been explained. Yes   Patient, Family & other staff in agreement with plan of care Yes   [facility] TM \"6 Clicks\"   2016, Trustees of [facility], under license to [employer].  All rights reserved.   6 Clicks Short Forms Daily Activity Inpatient Short Form   [facility]  \"6 Clicks\" Daily Activity Inpatient Short Form   1. Putting on and taking off regular lower body clothing? 4 - None   2. Bathing (including washing, rinsing, drying)? " 4 - None   3. Toileting, which includes using toilet, bedpan or urinal? 4 - None   4. Putting on and taking off regular upper body clothing? 4 - None   5. Taking care of personal grooming such as brushing teeth? 4 - None   6. Eating meals? 4 - None   Daily Activity Raw Score (Score out of 24.Lower scores equate to lower levels of function) 24   Total Evaluation Time   Total Evaluation Time (Minutes) 15

## 2018-02-15 ENCOUNTER — CARE COORDINATION (OUTPATIENT)
Dept: CARE COORDINATION | Facility: CLINIC | Age: 48
End: 2018-02-15

## 2018-02-15 LAB
BACTERIA SPEC CULT: NORMAL
Lab: NORMAL
SPECIMEN SOURCE: NORMAL

## 2018-02-15 RX ORDER — NITROFURANTOIN 25; 75 MG/1; MG/1
100 CAPSULE ORAL EVERY 12 HOURS
Qty: 14 CAPSULE | Refills: 0 | Status: SHIPPED | OUTPATIENT
Start: 2018-02-15 | End: 2018-06-12

## 2018-02-16 LAB
BACTERIA SPEC CULT: ABNORMAL
Lab: ABNORMAL
SPECIMEN SOURCE: ABNORMAL

## 2018-02-21 DIAGNOSIS — R90.89 ABNORMAL FINDING ON MRI OF BRAIN: Primary | ICD-10-CM

## 2018-02-21 DIAGNOSIS — I67.83 PRES (POSTERIOR REVERSIBLE ENCEPHALOPATHY SYNDROME): ICD-10-CM

## 2018-02-26 ENCOUNTER — TELEPHONE (OUTPATIENT)
Dept: NEUROLOGY | Facility: CLINIC | Age: 48
End: 2018-02-26

## 2018-02-26 ENCOUNTER — OFFICE VISIT (OUTPATIENT)
Dept: PSYCHIATRY | Facility: CLINIC | Age: 48
End: 2018-02-26
Attending: PSYCHIATRY & NEUROLOGY
Payer: COMMERCIAL

## 2018-02-26 VITALS
DIASTOLIC BLOOD PRESSURE: 82 MMHG | HEART RATE: 101 BPM | WEIGHT: 119.6 LBS | BODY MASS INDEX: 21.19 KG/M2 | SYSTOLIC BLOOD PRESSURE: 128 MMHG

## 2018-02-26 DIAGNOSIS — F31.9 BIPOLAR I DISORDER (H): Primary | ICD-10-CM

## 2018-02-26 PROCEDURE — G0463 HOSPITAL OUTPT CLINIC VISIT: HCPCS | Mod: ZF

## 2018-02-26 ASSESSMENT — PAIN SCALES - GENERAL: PAINLEVEL: SEVERE PAIN (7)

## 2018-02-26 NOTE — MR AVS SNAPSHOT
After Visit Summary   2/26/2018    Ana Laura Wharton    MRN: [id number]           Patient Information     Date Of Birth          1970        Visit Information        Provider Department      2/26/2018 2:00 PM Jaden Rodriguez MD [facility]        Today's Diagnoses     Bipolar I disorder (H)    -  1       Follow-ups after your visit        Your next 10 appointments already scheduled     Apr 19, 2018  3:00 PM CDT   Adult Med Follow UP with Jaden Rodriguez MD   Psychiatry Clinic ([facility])    [place] [id number]  [address] [id number] [phone]              Who to contact     Please call your clinic at [phone] to:    Ask questions about your health    Make or cancel appointments    Discuss your medicines    Learn about your test results    Speak to your doctor            Additional Information About Your Visit        MyChart Information     [employer] gives you secure access to your electronic health record. If you see a primary care provider, you can also send messages to your care team and make appointments. If you have questions, please call your primary care clinic.  If you do not have a primary care provider, please call [phone] and they will assist you.      [employer] is an electronic gateway that provides easy, online access to your medical records. With [employer], you can request a clinic appointment, read your test results, renew a prescription or communicate with your care team.     To access your existing account, please contact your [facility] or call [phone] for assistance.        Care EveryWhere ID     This is your Care EveryWhere ID. This could be used by other organizations to access your [place] medical records  [id number]        Your Vitals Were     Pulse BMI (Body Mass Index)                101 21.19 kg/m2           Blood Pressure from Last 3 Encounters:   03/26/18 106/76    02/26/18 128/82   02/14/18 147/85    Weight from Last 3 Encounters:   03/26/18 54.3 kg (119 lb 12.8 oz)   02/26/18 54.3 kg (119 lb 9.6 oz)   02/13/18 52.2 kg (115 lb)              Today, you had the following     No orders found for display       Primary Care Provider Office Phone # Fax #    Ander Fuchs [phone]       [facility] [place] [address]  [place] [address]        Equal Access to Services     [facility]: Hadii aad ku hadasho Soomaali, waaxda luqadaha, qaybta kaalmada adeegyada, jaspal oscar . So [facility] [phone].    ATENCIÓN: Si habla español, tiene a gold disposición servicios gratuitos de asistencia lingüística. [facility] [phone].    We comply with applicable federal civil rights laws and [place] laws. We do not discriminate on the basis of race, color, national origin, age, disability, sex, sexual orientation, or gender identity.            Thank you!     Thank you for choosing PS[facility]AT[facility] CLINIC  for your care. Our goal is always to provide you with excellent care. Hearing back from our patients is one way we can continue to improve our services. Please take a few minutes to complete the written survey that you may receive in the mail after your visit with us. Thank you!             Your Updated Medication List - Protect others around you: Learn how to safely use, store and throw away your medicines at www.disposemymeds.org.          This list is accurate as of 2/26/18 11:59 PM.  Always use your most recent med list.                   Brand Name Dispense Instructions for use Diagnosis    acetaminophen 325 MG tablet    TYLENOL    30 tablet    Take 3 tablets (975 mg) by mouth every 8 hours as needed for mild pain or fever    Traumatic cerebral edema without loss of consciousness without open intracranial wound, initial encounter (H)       carvedilol 6.25 MG tablet    COREG    60 tablet    1 tablet (6.25 mg) by Oral or Feeding Tube  route 2 times daily    Paroxysmal atrial fibrillation (H)       CIPRO 250 MG tablet   Generic drug:  ciprofloxacin      Take 125 mg by mouth daily For UTI prevention        clonazePAM 1 MG tablet    klonoPIN    60 tablet    Take 1 tablet (1 mg) by mouth 2 times daily as needed for anxiety    Bipolar I disorder (H)       cloNIDine 0.1 MG tablet    CATAPRES     Take 0.1 mg by mouth 2 times daily        folic acid 1 MG tablet    FOLVITE    30 tablet    Take 1 tablet (1 mg) by mouth daily    Alcoholic intoxication with complication (H)       hydrOXYzine 50 MG capsule    VISTARIL    60 capsule    Take 1 capsule (50 mg) by mouth 2 times daily as needed for anxiety    Bipolar I disorder (H)       IBUPROFEN PO      Take 400-600 mg by mouth every 6 hours as needed for moderate pain        nitroFURantoin (macrocrystal-monohydrate) 100 MG capsule    MACROBID    14 capsule    Take 1 capsule (100 mg) by mouth every 12 hours    Urinary tract infection without hematuria, site unspecified       OLANZapine 5 MG tablet    zyPREXA    60 tablet    Take 1 tablet (5 mg) by mouth 2 times daily    Bipolar I disorder (H)       predniSONE 10 MG tablet    DELTASONE     Take by mouth daily 30mg x 3 days then 20mg x 3 days then 10mg x 3 days then stop        PROTONIX PO      Take 40 mg by mouth every morning (before breakfast)        SYNTHROID PO      Take 50 mcg by mouth daily        ZOFRAN ODT PO      Take 4 mg by mouth every 8 hours as needed for nausea

## 2018-02-26 NOTE — TELEPHONE ENCOUNTER
----- Message from Trixie Bartlett sent at 2/23/2018  9:52 AM CST -----  Left pt 2VMs    ----- Message -----     From: Michelle Roberts, KISHA     Sent: 2/21/2018   8:15 AM       To: Clinic [id number]    Please help schedule a NGN with Dr. Ramirez with MRI prior to appointment for hospital dc follow up leukoencephalopathy syndrome.  MRI orders are in epic.    Thank you!

## 2018-02-26 NOTE — NURSING NOTE
Blood pressure 128/82, pulse 101.    Reviewed Allergies, Medications, Pharmacy, Smoking Status, and Pain Level  Administered Abuse Screening Questions   Obtained Weight, Blood Pressure, Heart Rate

## 2018-03-22 ENCOUNTER — TELEPHONE (OUTPATIENT)
Dept: PSYCHIATRY | Facility: CLINIC | Age: 48
End: 2018-03-22

## 2018-03-22 NOTE — TELEPHONE ENCOUNTER
----- Message from Pratibha Thomas sent at 3/22/2018 11:46 AM CDT -----  Regarding: Request Provider Call  Contact: [phone]  Tunde Campa    Ana Laura is calling to speak with Dr. Rodriguez.  She is not doing very well, emotionally.  I asked her if she could come in today at 2 and she said she would have to get back to me.  Her preference would be that Dr. Rodriguez just call her.    Ana Laura can be reached at [phone].  It is ok to leave a detailed message.  I told her I could hold the 2 PM appointment for her for 1 hour.    Thanks,  Pratibha WAYNE

## 2018-03-22 NOTE — TELEPHONE ENCOUNTER
Last appt: 2/26/18  Can: 3/19/18  Pend: 4/19/18    Routed to Dr. Rodriguez to see if available to reach out to pt.

## 2018-03-22 NOTE — TELEPHONE ENCOUNTER
Jaden Rodriguez MD Bove, Michelle, RN        Caller: Unspecified (Today, 12:26 PM)                     I spoke with her.  Can you put her on my schedule for Monday at Noon?       Msg sent to scheduling to enter appt.

## 2018-03-26 ENCOUNTER — OFFICE VISIT (OUTPATIENT)
Dept: PSYCHIATRY | Facility: CLINIC | Age: 48
End: 2018-03-26
Attending: PSYCHIATRY & NEUROLOGY
Payer: COMMERCIAL

## 2018-03-26 VITALS
BODY MASS INDEX: 21.22 KG/M2 | WEIGHT: 119.8 LBS | HEART RATE: 91 BPM | DIASTOLIC BLOOD PRESSURE: 76 MMHG | SYSTOLIC BLOOD PRESSURE: 106 MMHG

## 2018-03-26 DIAGNOSIS — F31.9 BIPOLAR I DISORDER (H): ICD-10-CM

## 2018-03-26 PROCEDURE — G0463 HOSPITAL OUTPT CLINIC VISIT: HCPCS | Mod: ZF

## 2018-03-26 RX ORDER — MIRTAZAPINE 30 MG/1
30 TABLET, FILM COATED ORAL AT BEDTIME
Qty: 90 TABLET | Refills: 0 | Status: SHIPPED | OUTPATIENT
Start: 2018-03-26 | End: 2018-06-12

## 2018-03-26 ASSESSMENT — PAIN SCALES - GENERAL: PAINLEVEL: NO PAIN (0)

## 2018-03-26 NOTE — MR AVS SNAPSHOT
After Visit Summary   3/26/2018    Ana Laura Wharton    MRN: [id number]           Patient Information     Date Of Birth          1970        Visit Information        Provider Department      3/26/2018 12:00 PM Jaden Rodriguez MD [facility]        Today's Diagnoses     Bipolar I disorder (H)           Follow-ups after your visit        Who to contact     Please call your clinic at [phone] to:    Ask questions about your health    Make or cancel appointments    Discuss your medicines    Learn about your test results    Speak to your doctor            Additional Information About Your Visit        MyChart Information     [employer] gives you secure access to your electronic health record. If you see a primary care provider, you can also send messages to your care team and make appointments. If you have questions, please call your primary care clinic.  If you do not have a primary care provider, please call [phone] and they will assist you.      [employer] is an electronic gateway that provides easy, online access to your medical records. With [employer], you can request a clinic appointment, read your test results, renew a prescription or communicate with your care team.     To access your existing account, please contact your [facility] or call [phone] for assistance.        Care EveryWhere ID     This is your Care EveryWhere ID. This could be used by other organizations to access your [place] medical records  [id number]        Your Vitals Were     Pulse BMI (Body Mass Index)                91 21.22 kg/m2           Blood Pressure from Last 3 Encounters:   03/26/18 106/76   02/26/18 128/82   02/14/18 147/85    Weight from Last 3 Encounters:   03/26/18 54.3 kg (119 lb 12.8 oz)   02/26/18 54.3 kg (119 lb 9.6 oz)   02/13/18 52.2 kg (115 lb)              Today, you had the following     No orders found for display         Today's Medication Changes           These changes are accurate as of 3/26/18 11:59 PM.  If you have any questions, ask your nurse or doctor.               These medicines have changed or have updated prescriptions.        Dose/Directions    mirtazapine 30 MG tablet   Commonly known as:  REMERON   This may have changed:    - medication strength  - how much to take   Used for:  Bipolar I disorder (H)   Changed by:  Jaden Rodriguez MD        Dose:  30 mg   Take 1 tablet (30 mg) by mouth At Bedtime   Quantity:  90 tablet   Refills:  0            Where to get your medicines      These medications were sent to [id number] - [place], [place] - [address] [place] [address]  [address], [facility] [address]     Phone:  [phone]     mirtazapine 30 MG tablet                Primary Care Provider Office Phone # Fax #    Ander Fuchs [phone]       [facility] [place] [address]  [facility] [address]        Equal Access to Services     [facility] AH: Hadii aad ku hadasho Soomaali, waaxda luqadaha, qaybta kaalmada adeegyada, waxay idiin hayaan jeanie kharaclark oscar . So [facility] [phone].    ATENCIÓN: Si habla español, tiene a gold disposición servicios gratuitos de asistencia lingüística. [facility] [phone].    We comply with applicable federal civil rights laws and [place] laws. We do not discriminate on the basis of race, color, national origin, age, disability, sex, sexual orientation, or gender identity.            Thank you!     Thank you for choosing [facility]  for your care. Our goal is always to provide you with excellent care. Hearing back from our patients is one way we can continue to improve our services. Please take a few minutes to complete the written survey that you may receive in the mail after your visit with us. Thank you!             Your Updated Medication List - Protect others around you: Learn how to safely use, store and throw away your  medicines at www.disposemymeds.org.          This list is accurate as of 3/26/18 11:59 PM.  Always use your most recent med list.                   Brand Name Dispense Instructions for use Diagnosis    acetaminophen 325 MG tablet    TYLENOL    30 tablet    Take 3 tablets (975 mg) by mouth every 8 hours as needed for mild pain or fever    Traumatic cerebral edema without loss of consciousness without open intracranial wound, initial encounter (H)       carvedilol 6.25 MG tablet    COREG    60 tablet    1 tablet (6.25 mg) by Oral or Feeding Tube route 2 times daily    Paroxysmal atrial fibrillation (H)       CIPRO 250 MG tablet   Generic drug:  ciprofloxacin      Take 125 mg by mouth daily For UTI prevention        clonazePAM 1 MG tablet    klonoPIN    60 tablet    Take 1 tablet (1 mg) by mouth 2 times daily as needed for anxiety    Bipolar I disorder (H)       cloNIDine 0.1 MG tablet    CATAPRES     Take 0.1 mg by mouth 2 times daily        folic acid 1 MG tablet    FOLVITE    30 tablet    Take 1 tablet (1 mg) by mouth daily    Alcoholic intoxication with complication (H)       hydrOXYzine 50 MG capsule    VISTARIL    60 capsule    Take 1 capsule (50 mg) by mouth 2 times daily as needed for anxiety    Bipolar I disorder (H)       IBUPROFEN PO      Take 400-600 mg by mouth every 6 hours as needed for moderate pain        mirtazapine 30 MG tablet    REMERON    90 tablet    Take 1 tablet (30 mg) by mouth At Bedtime    Bipolar I disorder (H)       nitroFURantoin (macrocrystal-monohydrate) 100 MG capsule    MACROBID    14 capsule    Take 1 capsule (100 mg) by mouth every 12 hours    Urinary tract infection without hematuria, site unspecified       predniSONE 10 MG tablet    DELTASONE     Take by mouth daily 30mg x 3 days then 20mg x 3 days then 10mg x 3 days then stop        PROTONIX PO      Take 40 mg by mouth every morning (before breakfast)        SYNTHROID PO      Take 50 mcg by mouth daily        ZOFRAN ODT PO       Take 4 mg by mouth every 8 hours as needed for nausea

## 2018-03-26 NOTE — NURSING NOTE
Chief Complaint   Patient presents with     RECHECK     Bipolar I disorder      Reviewed allergies, smoking status, pharmacy preference and medications  Obtained weight, blood pressure and heart rate

## 2018-04-02 NOTE — PROGRESS NOTES
Service Date: 02/26/2018      PSYCHIATRY CLINIC PROGRESS NOTE:  The patient returns for medication management and supportive therapy.      SINCE THE LAST VISIT:  Patient overdosed on blood pressure pills.  Hospitalized for 5 weeks.  Was in ICU on ventilator.  Was at rehab for one week.  Developed ARDS.  Going to DBT-based counseling.  Depression much improved.      RECENT SYMPTOMS:   Depression:  No suicidal ideation, mild depressed mood, no anhedonia, low energy, hypersomnia.  Appetite is okay.  Excessive guilt, feelings of worthlessness, feels slowed.   Elevated:  No manic symptoms.   Psychosis:  None.   Panic Attack:  Daily.   Anxiety:  Excessive worry, feeling fearful about finances, restless/overwhelmed.   Trauma Related:  No.      ADVERSE EFFECTS:  Not aware of any.      MEDICAL CONCERNS:  See interim history.  Still has C. diff.      RECENT SUBSTANCE USE:  No alcohol, no tobacco, and no cannabis.      SOCIAL/FAMILY HISTORY:  Divorce is going through.  Now living alone in an apartment.  Doesn't get out of apartment daily secondary to finances.  Does see friends.  Doesn't see son much.      MEDICAL/SURGICAL HISTORY:  See EMR medical problem list.      MEDICAL REVIEW OF SYSTEMS:  A comprehensive review of systems was performed and is negative other than noted in the HPI.      ALLERGIES:  No new allergies.      CURRENT MEDICATIONS:  See EMR med section.      VITALS:  See rooming note.      MENTAL STATUS EXAM:  Alert, well-groomed, cooperative.  Speech of normal rate and flow.  Normal psychomotor, reactive mood, affect congruent with mood.  Thought process/associations logical, no current SI, no psychosis.  Insight limited, judgment poor.  Oriented x 4, attention span and concentration okay.  Recent and remote memory intact.  Fund of knowledge adequate.  Gait and station normal.      DIAGNOSIS AND ASSESSMENT:  Bipolar type I - most recent depressed.  Multiple serious suicide attempts.  At very high risk of  completion.      PLAN:   1. Declines mood stabilizing meds.  Willing to continue Zyprexa.  Continue low-dose Remeron.   2. Individual therapy.   3. RTC - 3 weeks.   4. Crisis numbers provided routinely in AVS.      TREATMENT RISK STATEMENT:  The patient understands the risks, benefits, adverse effects, and alternatives.  Agrees to treatment with the capacity to do so.  No medical contraindications to treatment.  Agrees to call clinic for any problems.  The patient understands to call 911 or come to the nearest ED if life-threatening or urgent symptoms present.      TOTAL TIME SPENT:  30-minute, face-to-face encounter of which more than 50% of the time was spent on education of warning signs of impending mood switch.  Also supportive counseling regarding divorce and finances.               ljl         PEIDAD SADLER MD             D: 2018   T: 2018   MT: LILI      Name:     KEE TNIEO   MRN:      [id number]        Account:      [id number]   :      1970           Service Date: 2018      Document: [id number]

## 2018-04-09 ENCOUNTER — TELEPHONE (OUTPATIENT)
Dept: PSYCHIATRY | Facility: CLINIC | Age: 48
End: 2018-04-09

## 2018-04-09 DIAGNOSIS — F31.9 BIPOLAR I DISORDER (H): ICD-10-CM

## 2018-04-09 RX ORDER — OLANZAPINE 5 MG/1
5 TABLET ORAL 3 TIMES DAILY
Qty: 90 TABLET | Refills: 0 | Status: SHIPPED | OUTPATIENT
Start: 2018-04-09 | End: 2018-06-12

## 2018-04-09 NOTE — TELEPHONE ENCOUNTER
FW: Medication Question/FYI  Received: Today       Hattie Motta, Hattie Conner, RN       Phone Number: [phone]                       Previous Messages       ----- Message -----      From: Pratibha Thomas      Sent: 4/9/2018   8:47 AM        To: Katheryn Vargas RN   Subject: Medication Question/FYI                           Hi Ana Laura Campa is calling because Dr. Rodriguez has ok'd her taking another tablet of Zyprexa if she is feeling anxious.  She has been taking this for the last four days.  She is concerned that her medication will run out early, because she was only prescribed enough for two a day.  She is not sure exactly how much of the medication she has left.     The patient uses [email or web address] in [place] on [address].  She can be reached at [phone].  It is ok to leave a detailed message.     ThanksPratibha

## 2018-04-09 NOTE — TELEPHONE ENCOUNTER
Message  Received: Today       Jaden Rodriguez MD Pratt, Laura RN       Caller: Unspecified (Today,  9:11 AM)                     Fill at 3 tabs per day #90.

## 2018-04-09 NOTE — TELEPHONE ENCOUNTER
-Refilled at 3 tabs/day with #90 per provider's orders  -E-prescribed to [facility] in [place] per pt's request  -Called pt to inform her that a 30 d/s has been sent to the pharmacy

## 2018-04-09 NOTE — TELEPHONE ENCOUNTER
Last seen: 3/26/18  Cancel: none  No-show: none  Next appt: 4/19/18     Medication requested: OLANZapine (ZYPREXA) 5 MG tablet  Directions: Take 1 tablet (5 mg) by mouth 2 times daily  Qty: 60     Routed to provider as pt states she's taking medication different than last prescriptions  Will receive orders for new refill

## 2018-04-23 NOTE — PROGRESS NOTES
PSYCHIATRY CLINIC PROGRESS NOTE      The patient returns for medication management and supportive therapy.      Interim History   Since the last visit Ana Laura has been feeling better with her increase in Remeron to 30 mg. Her divorce is coming through. She lives in a [facility] in [place]. Her friends visit regularly. She is fearful that she will be losing her health insurance.      RECENT SYMPTOMS   Depression:   Ana Laura endorses diminished suicidal ideation, somewhat depressed mood, little interest in things, low energy, insomnia with middle awakening, poor appetite, and excessive guilt.      Elevated mood:   Patient denies manic symptoms.      Psychosis:   Patient denies symptoms of psychosis.      Panic Attacks:   Patient endorses more frequent panic attacks during the night.      Anxiety:   Patient endorses excessive worry, feelings of fearfulness, nervousness, restlessness, and feelings of being overwhelmed.      Trauma-related:   Patient denies trauma-related symptoms.      ADVERSE EFFECTS:   Dry mouth      MEDICAL CONCERNS   1. C. diff - she takes Cipro daily for recurrent UTIs.      SUBSTANCE USE:   Patient endorses drinking two drinks twice weekly. She just restarted tobacco.      SOCIAL/FAMILY HISTORY:   Ana Laura lives alone. She saw her son only twice this year. She is very stressed by her divorce. Her friends are supportive.      MEDICAL/SURGICAL HISTORY:   See EMR medical problems list.      MEDICAL REVIEW OF SYSTEMS:   A comprehensive review of systems was performed and is negative other than as noted in the HPI.      ALLERGY:   None new.      CURRENT MEDICATIONS:   See EMR med sections.      VITALS:   See rooming note.      MENTAL STATUS EXAM:   On exam, patient was alert, well-groomed, and cooperative with the interview. Speech was normal rate and flow. Movements were fidgety. Mood was sad with congruent affect. Thought processes were logical without suicidal ideation or psychosis. Insight and  judgment were OK. She was oriented x4 with good insult and concentration and intact memory. Fund of knowledge was adequate. Gait and station were normal.      Labs and Data:         DIAGNOSIS AND ASSESSMENT:   Bipolar I disorder - most recently depressed      PLAN:   1. Medications:   a. Continue Remeron 30 mg   b. Continue Zyprexa 5 mg BID   2. Therapy:   3. RTC in 3 weeks   4. Other:   5. Crisis numbers: provided routinely in AVS.      Treatment Risk Statement: The patient understands the risks, benefits, adverse effects, and alternatives. Agrees to treatment with the capacity to do so. No medical contraindications to treatment. Agrees to call clinic for any problems. The patient understands to call 911 or come to the nearest ED is life-threatening or urgent symptoms present.      This was a 25-minute face-to-face encounter.

## 2018-06-12 ENCOUNTER — OFFICE VISIT (OUTPATIENT)
Dept: PSYCHIATRY | Facility: CLINIC | Age: 48
End: 2018-06-12
Attending: PSYCHIATRY & NEUROLOGY
Payer: COMMERCIAL

## 2018-06-12 VITALS
HEART RATE: 76 BPM | BODY MASS INDEX: 21.47 KG/M2 | WEIGHT: 121.2 LBS | SYSTOLIC BLOOD PRESSURE: 107 MMHG | DIASTOLIC BLOOD PRESSURE: 74 MMHG

## 2018-06-12 DIAGNOSIS — F31.9 BIPOLAR I DISORDER (H): ICD-10-CM

## 2018-06-12 PROCEDURE — G0463 HOSPITAL OUTPT CLINIC VISIT: HCPCS | Mod: ZF

## 2018-06-12 RX ORDER — CLONAZEPAM 1 MG/1
2 TABLET ORAL AT BEDTIME
Qty: 180 TABLET | Refills: 0 | Status: SHIPPED | OUTPATIENT
Start: 2018-06-12 | End: 2018-09-10

## 2018-06-12 RX ORDER — HYDROXYZINE PAMOATE 50 MG/1
100 CAPSULE ORAL DAILY
Qty: 180 CAPSULE | Refills: 0 | Status: SHIPPED | OUTPATIENT
Start: 2018-06-12 | End: 2018-09-21

## 2018-06-12 RX ORDER — OLANZAPINE 5 MG/1
TABLET ORAL
Qty: 270 TABLET | Refills: 0 | Status: ON HOLD | OUTPATIENT
Start: 2018-06-12 | End: 2018-07-31

## 2018-06-12 RX ORDER — CYCLOBENZAPRINE HCL 5 MG
5 TABLET ORAL
Qty: 90 TABLET | Refills: 0 | Status: ON HOLD | OUTPATIENT
Start: 2018-06-12 | End: 2018-07-31

## 2018-06-12 RX ORDER — MIRTAZAPINE 30 MG/1
30 TABLET, FILM COATED ORAL AT BEDTIME
Qty: 90 TABLET | Refills: 0 | Status: SHIPPED | OUTPATIENT
Start: 2018-06-12 | End: 2018-09-04

## 2018-06-12 ASSESSMENT — PAIN SCALES - GENERAL: PAINLEVEL: NO PAIN (0)

## 2018-06-12 NOTE — MR AVS SNAPSHOT
After Visit Summary   6/12/2018    Ana Laura Wharton    MRN: [id number]           Patient Information     Date Of Birth          1970        Visit Information        Provider Department      6/12/2018 12:00 PM Jaden Rodriguez MD Psychiatry Clinic        Today's Diagnoses     Bipolar I disorder (H)           Follow-ups after your visit        Who to contact     Please call your clinic at [phone] to:    Ask questions about your health    Make or cancel appointments    Discuss your medicines    Learn about your test results    Speak to your doctor            Additional Information About Your Visit        MyChart Information     [employer] gives you secure access to your electronic health record. If you see a primary care provider, you can also send messages to your care team and make appointments. If you have questions, please call your primary care clinic.  If you do not have a primary care provider, please call [phone] and they will assist you.      [employer] is an electronic gateway that provides easy, online access to your medical records. With [employer], you can request a clinic appointment, read your test results, renew a prescription or communicate with your care team.     To access your existing account, please contact your [facility] or call [phone] for assistance.        Care EveryWhere ID     This is your Care EveryWhere ID. This could be used by other organizations to access your [facility] medical records  [id number]        Your Vitals Were     Pulse BMI (Body Mass Index)                76 21.47 kg/m2           Blood Pressure from Last 3 Encounters:   06/12/18 107/74   03/26/18 106/76   02/26/18 128/82    Weight from Last 3 Encounters:   06/12/18 55 kg (121 lb 3.2 oz)   03/26/18 54.3 kg (119 lb 12.8 oz)   02/26/18 54.3 kg (119 lb 9.6 oz)              Today, you had the following     No orders found for display         Today's Medication Changes           These changes are accurate as of 6/12/18 11:59 PM.  If you have any questions, ask your nurse or doctor.               Start taking these medicines.        Dose/Directions    cyclobenzaprine 5 MG tablet   Commonly known as:  FLEXERIL   Used for:  Bipolar I disorder (H)   Started by:  Jaden Rodriguez MD        Dose:  5 mg   Take 1 tablet (5 mg) by mouth nightly as needed for muscle spasms   Quantity:  90 tablet   Refills:  0         These medicines have changed or have updated prescriptions.        Dose/Directions    clonazePAM 1 MG tablet   Commonly known as:  klonoPIN   This may have changed:    - how much to take  - when to take this  - reasons to take this   Used for:  Bipolar I disorder (H)   Changed by:  Jaden Rodriguez MD        Dose:  2 mg   Take 2 tablets (2 mg) by mouth At Bedtime   Quantity:  180 tablet   Refills:  0       hydrOXYzine 50 MG capsule   Commonly known as:  VISTARIL   This may have changed:    - how much to take  - when to take this  - reasons to take this   Used for:  Bipolar I disorder (H)   Changed by:  Jaden Rodriguez MD        Dose:  100 mg   Take 2 capsules (100 mg) by mouth daily   Quantity:  180 capsule   Refills:  0       OLANZapine 5 MG tablet   Commonly known as:  zyPREXA   This may have changed:    - how much to take  - how to take this  - when to take this  - additional instructions   Used for:  Bipolar I disorder (H)   Changed by:  Jaden Rodriguez MD        2 tablets in AM and 1 tablet at bedtime   Quantity:  270 tablet   Refills:  0         Stop taking these medicines if you haven't already. Please contact your care team if you have questions.     nitroFURantoin (macrocrystal-monohydrate) 100 MG capsule   Commonly known as:  MACROBID   Stopped by:  Jaden Rodriguez MD           predniSONE 10 MG tablet   Commonly known as:  DELTASONE   Stopped by:  Jaden Rodriguez MD                Where to get your medicines      These medications were sent to [facility] - [place], [place] - [address]  [address], [facility] [address]     Phone:  [phone]     cyclobenzaprine 5 MG tablet    hydrOXYzine 50 MG capsule    mirtazapine 30 MG tablet    OLANZapine 5 MG tablet         Some of these will need a paper prescription and others can be bought over the counter.  Ask your nurse if you have questions.     Bring a paper prescription for each of these medications     clonazePAM 1 MG tablet                Primary Care Provider Office Phone # Fax #    Ander Fuchs [phone]       [facility] [place] [address]  [place] [address]        Equal Access to Services     OLGA LUTZ : Hadii valente avendano hadasho Somehdiali, waaxda luqadaha, qaybta kaalmada adeegyada, jaspal oscar . So [facility] [phone].    ATENCIÓN: Si habla español, tiene a gold disposición servicios gratuitos de asistencia lingüística. [facility] [phone].    We comply with applicable federal civil rights laws and [place] laws. We do not discriminate on the basis of race, color, national origin, age, disability, sex, sexual orientation, or gender identity.            Thank you!     Thank you for choosing [facility]  for your care. Our goal is always to provide you with excellent care. Hearing back from our patients is one way we can continue to improve our services. Please take a few minutes to complete the written survey that you may receive in the mail after your visit with us. Thank you!             Your Updated Medication List - Protect others around you: Learn how to safely use, store and throw away your medicines at www.disposemymeds.org.          This list is accurate as of 6/12/18 11:59 PM.  Always use your most recent med list.                   Brand Name Dispense Instructions for use Diagnosis    acetaminophen 325 MG tablet    TYLENOL    30 tablet    Take 3 tablets (975 mg) by mouth  every 8 hours as needed for mild pain or fever    Traumatic cerebral edema without loss of consciousness without open intracranial wound, initial encounter (H)       carvedilol 6.25 MG tablet    COREG    60 tablet    1 tablet (6.25 mg) by Oral or Feeding Tube route 2 times daily    Paroxysmal atrial fibrillation (H)       CIPRO 250 MG tablet   Generic drug:  ciprofloxacin      Take 125 mg by mouth daily For UTI prevention        clonazePAM 1 MG tablet    klonoPIN    180 tablet    Take 2 tablets (2 mg) by mouth At Bedtime    Bipolar I disorder (H)       cloNIDine 0.1 MG tablet    CATAPRES     Take 0.1 mg by mouth 2 times daily        cyclobenzaprine 5 MG tablet    FLEXERIL    90 tablet    Take 1 tablet (5 mg) by mouth nightly as needed for muscle spasms    Bipolar I disorder (H)       folic acid 1 MG tablet    FOLVITE    30 tablet    Take 1 tablet (1 mg) by mouth daily    Alcoholic intoxication with complication (H)       hydrOXYzine 50 MG capsule    VISTARIL    180 capsule    Take 2 capsules (100 mg) by mouth daily    Bipolar I disorder (H)       IBUPROFEN PO      Take 400-600 mg by mouth every 6 hours as needed for moderate pain        mirtazapine 30 MG tablet    REMERON    90 tablet    Take 1 tablet (30 mg) by mouth At Bedtime    Bipolar I disorder (H)       OLANZapine 5 MG tablet    zyPREXA    270 tablet    2 tablets in AM and 1 tablet at bedtime    Bipolar I disorder (H)       PROTONIX PO      Take 40 mg by mouth every morning (before breakfast)        SYNTHROID PO      Take 50 mcg by mouth daily        ZOFRAN ODT PO      Take 4 mg by mouth every 8 hours as needed for nausea

## 2018-06-15 NOTE — PROGRESS NOTES
Service Date: 2018      PSYCHIATRY CLINIC PROGRESS NOTE      The patient returns for medication management and supportive therapy.      Interim History:      Since the last visit, Father  last Friday.  Stopped drinking.  Was in a severe depression.  Sober x 9 days.  Mood improved after stopped drinking.  Sadness is only related to Father's death.  Increased Remeron to 30 mg, which was quite helpful.  Also taking Zyprexa.      RECENT SYMPTOMS:      Depression:  No suicidal ideation, depressed mood only in relation to death, no anhedonia, no low energy, insomnia, gained 21 lbs.      Elevated Mood:  No manic symptoms for past year.      Psychosis:  None.      Panic Attacks:  Some.      Anxiety:  Excessive worry, nervous/tense, restless.      Trauma-Related Symptoms:  None.      ADVERSE EFFECTS:  Dry mouth.      MEDICAL CONCERNS:  Neck pain, C. diff - takes Cipro daily for VTI prophylaxis.      SUBSTANCE USE:  Quit drinking, smokes cigarettes.      SOCIAL/FAMILY HISTORY:  Goes to [facility] support groups. Divorce will be finalized this summer.  Has virtually no contact with son who is 16 years old.      MEDICAL/SURGICAL HISTORY:  See EMR medical problems list.      MEDICAL REVIEW OF SYSTEMS:  A comprehensive review of systems was performed and is negative other than as noted in the HPI.      ALLERGIES:  No new allergies.      CURRENT MEDICATIONS:  See EMR med sections.      VITALS:  See rooming note.      MENTAL STATUS EXAM:  Alert, well-groomed, cooperative.  Speech normal rate and flow.  Normal psychomotor.  Mood reactive, affect smiling.  Logical thought processes/associations.  No suicidal ideation, no psychosis.  Insight and judgment okay.  Oriented x 4.  Attention span and concentration adequate.  Recent and remote memory intact.  Fund of knowledge good.  Gait and station normal.      DIAGNOSIS AND ASSESSMENT:  Bipolar affective disorder - currently in remission.  Alcohol dependence in brief remission.       PLAN:   1. Medications:  No changes.  Gave Rx for Flexeril for neck pain, which also helps her sleep.   2. Therapy:  [facility] support group.   3. RTC:  TBA once has insurance.   4. Crisis numbers:  Provided routinely in AVS.      Treatment Risk Statement: The patient understands the risks, benefits, adverse effects, and alternatives. Agrees to treatment with the capacity to do so. No medical contraindications to treatment. Agrees to call clinic for any problems. The patient understands to call 911 or come to the nearest ED is life-threatening or urgent symptoms present.      TOTAL TIME SPENT:  25-minute, face-to-face encounter.               ljl         PIEDAD SADLER MD             D: 06/15/2018   T: 06/15/2018   MT: LILI      Name:     KEE TINEO   MRN:      [id number]        Account:      [id number]   :      1970           Service Date: 2018      Document: [id number]

## 2018-07-31 ENCOUNTER — HOSPITAL ENCOUNTER (INPATIENT)
Facility: CLINIC | Age: 48
LOS: 3 days | Discharge: LEFT BEFORE DISCHARGE | DRG: 440 | End: 2018-08-03
Attending: EMERGENCY MEDICINE | Admitting: HOSPITALIST
Payer: COMMERCIAL

## 2018-07-31 ENCOUNTER — APPOINTMENT (OUTPATIENT)
Dept: CT IMAGING | Facility: CLINIC | Age: 48
DRG: 440 | End: 2018-07-31
Attending: EMERGENCY MEDICINE
Payer: COMMERCIAL

## 2018-07-31 DIAGNOSIS — R11.2 NAUSEA AND VOMITING, INTRACTABILITY OF VOMITING NOT SPECIFIED, UNSPECIFIED VOMITING TYPE: ICD-10-CM

## 2018-07-31 DIAGNOSIS — K85.20 ALCOHOL-INDUCED ACUTE PANCREATITIS, UNSPECIFIED COMPLICATION STATUS: ICD-10-CM

## 2018-07-31 PROBLEM — K85.90 PANCREATITIS: Status: ACTIVE | Noted: 2018-07-31

## 2018-07-31 LAB
ALBUMIN SERPL-MCNC: 3.9 G/DL (ref 3.4–5)
ALBUMIN UR-MCNC: NEGATIVE MG/DL
ALP SERPL-CCNC: 175 U/L (ref 40–150)
ALT SERPL W P-5'-P-CCNC: 27 U/L (ref 0–50)
ANION GAP SERPL CALCULATED.3IONS-SCNC: 12 MMOL/L (ref 3–14)
APPEARANCE UR: CLEAR
AST SERPL W P-5'-P-CCNC: 58 U/L (ref 0–45)
B-HCG FREE SERPL-ACNC: <5 IU/L
BASOPHILS # BLD AUTO: 0 10E9/L (ref 0–0.2)
BASOPHILS NFR BLD AUTO: 0.4 %
BILIRUB SERPL-MCNC: 0.6 MG/DL (ref 0.2–1.3)
BILIRUB UR QL STRIP: NEGATIVE
BUN SERPL-MCNC: 10 MG/DL (ref 7–30)
CALCIUM SERPL-MCNC: 7.9 MG/DL (ref 8.5–10.1)
CHLORIDE SERPL-SCNC: 101 MMOL/L (ref 94–109)
CO2 SERPL-SCNC: 26 MMOL/L (ref 20–32)
COLOR UR AUTO: YELLOW
CREAT SERPL-MCNC: 0.65 MG/DL (ref 0.52–1.04)
DIFFERENTIAL METHOD BLD: NORMAL
EOSINOPHIL # BLD AUTO: 0.1 10E9/L (ref 0–0.7)
EOSINOPHIL NFR BLD AUTO: 1.4 %
ERYTHROCYTE [DISTWIDTH] IN BLOOD BY AUTOMATED COUNT: 14.5 % (ref 10–15)
GFR SERPL CREATININE-BSD FRML MDRD: >90 ML/MIN/1.7M2
GLUCOSE SERPL-MCNC: 132 MG/DL (ref 70–99)
GLUCOSE UR STRIP-MCNC: NEGATIVE MG/DL
HCT VFR BLD AUTO: 38.4 % (ref 35–47)
HGB BLD-MCNC: 13.3 G/DL (ref 11.7–15.7)
HGB UR QL STRIP: ABNORMAL
IMM GRANULOCYTES # BLD: 0.1 10E9/L (ref 0–0.4)
IMM GRANULOCYTES NFR BLD: 0.5 %
KETONES UR STRIP-MCNC: NEGATIVE MG/DL
LEUKOCYTE ESTERASE UR QL STRIP: NEGATIVE
LIPASE SERPL-CCNC: 1354 U/L (ref 73–393)
LYMPHOCYTES # BLD AUTO: 2.2 10E9/L (ref 0.8–5.3)
LYMPHOCYTES NFR BLD AUTO: 23.1 %
MCH RBC QN AUTO: 32.2 PG (ref 26.5–33)
MCHC RBC AUTO-ENTMCNC: 34.6 G/DL (ref 31.5–36.5)
MCV RBC AUTO: 93 FL (ref 78–100)
MONOCYTES # BLD AUTO: 1 10E9/L (ref 0–1.3)
MONOCYTES NFR BLD AUTO: 10.7 %
MUCOUS THREADS #/AREA URNS LPF: PRESENT /LPF
NEUTROPHILS # BLD AUTO: 6.2 10E9/L (ref 1.6–8.3)
NEUTROPHILS NFR BLD AUTO: 63.9 %
NITRATE UR QL: POSITIVE
NRBC # BLD AUTO: 0 10*3/UL
NRBC BLD AUTO-RTO: 0 /100
PH UR STRIP: 6 PH (ref 5–7)
PLATELET # BLD AUTO: 210 10E9/L (ref 150–450)
POTASSIUM SERPL-SCNC: 3.8 MMOL/L (ref 3.4–5.3)
PROT SERPL-MCNC: 7.3 G/DL (ref 6.8–8.8)
RBC # BLD AUTO: 4.13 10E12/L (ref 3.8–5.2)
RBC #/AREA URNS AUTO: 2 /HPF (ref 0–2)
SODIUM SERPL-SCNC: 139 MMOL/L (ref 133–144)
SOURCE: ABNORMAL
SP GR UR STRIP: 1.01 (ref 1–1.03)
SQUAMOUS #/AREA URNS AUTO: 2 /HPF (ref 0–1)
UROBILINOGEN UR STRIP-MCNC: 0 MG/DL (ref 0–2)
WBC # BLD AUTO: 9.6 10E9/L (ref 4–11)
WBC #/AREA URNS AUTO: 4 /HPF (ref 0–5)

## 2018-07-31 PROCEDURE — 25000132 ZZH RX MED GY IP 250 OP 250 PS 637: Performed by: PHYSICIAN ASSISTANT

## 2018-07-31 PROCEDURE — 25000128 H RX IP 250 OP 636: Performed by: PHYSICIAN ASSISTANT

## 2018-07-31 PROCEDURE — HZ2ZZZZ DETOXIFICATION SERVICES FOR SUBSTANCE ABUSE TREATMENT: ICD-10-PCS | Performed by: HOSPITALIST

## 2018-07-31 PROCEDURE — 85025 COMPLETE CBC W/AUTO DIFF WBC: CPT | Performed by: EMERGENCY MEDICINE

## 2018-07-31 PROCEDURE — 80053 COMPREHEN METABOLIC PANEL: CPT | Performed by: EMERGENCY MEDICINE

## 2018-07-31 PROCEDURE — 99223 1ST HOSP IP/OBS HIGH 75: CPT | Mod: AI | Performed by: HOSPITALIST

## 2018-07-31 PROCEDURE — 25000125 ZZHC RX 250: Performed by: PHYSICIAN ASSISTANT

## 2018-07-31 PROCEDURE — 96375 TX/PRO/DX INJ NEW DRUG ADDON: CPT

## 2018-07-31 PROCEDURE — 25000132 ZZH RX MED GY IP 250 OP 250 PS 637: Performed by: HOSPITALIST

## 2018-07-31 PROCEDURE — 99285 EMERGENCY DEPT VISIT HI MDM: CPT | Mod: 25

## 2018-07-31 PROCEDURE — 96374 THER/PROPH/DIAG INJ IV PUSH: CPT | Mod: 59

## 2018-07-31 PROCEDURE — 74177 CT ABD & PELVIS W/CONTRAST: CPT

## 2018-07-31 PROCEDURE — 87086 URINE CULTURE/COLONY COUNT: CPT | Performed by: PHYSICIAN ASSISTANT

## 2018-07-31 PROCEDURE — 96376 TX/PRO/DX INJ SAME DRUG ADON: CPT

## 2018-07-31 PROCEDURE — 84702 CHORIONIC GONADOTROPIN TEST: CPT

## 2018-07-31 PROCEDURE — 83690 ASSAY OF LIPASE: CPT | Performed by: EMERGENCY MEDICINE

## 2018-07-31 PROCEDURE — 25000128 H RX IP 250 OP 636: Performed by: HOSPITALIST

## 2018-07-31 PROCEDURE — 25000128 H RX IP 250 OP 636: Performed by: EMERGENCY MEDICINE

## 2018-07-31 PROCEDURE — 12000007 ZZH R&B INTERMEDIATE

## 2018-07-31 PROCEDURE — 81001 URINALYSIS AUTO W/SCOPE: CPT | Performed by: EMERGENCY MEDICINE

## 2018-07-31 RX ORDER — CYCLOBENZAPRINE HCL 5 MG
5 TABLET ORAL AT BEDTIME
COMMUNITY
End: 2019-01-09

## 2018-07-31 RX ORDER — MULTIPLE VITAMINS W/ MINERALS TAB 9MG-400MCG
1 TAB ORAL DAILY
Status: DISCONTINUED | OUTPATIENT
Start: 2018-07-31 | End: 2018-08-03 | Stop reason: HOSPADM

## 2018-07-31 RX ORDER — CYCLOBENZAPRINE HCL 5 MG
5 TABLET ORAL AT BEDTIME
Status: DISCONTINUED | OUTPATIENT
Start: 2018-07-31 | End: 2018-08-03 | Stop reason: HOSPADM

## 2018-07-31 RX ORDER — LOPERAMIDE HYDROCHLORIDE 2 MG/1
4 TABLET ORAL PRN
Status: ON HOLD | COMMUNITY
End: 2019-05-29

## 2018-07-31 RX ORDER — ONDANSETRON 4 MG/1
4 TABLET, ORALLY DISINTEGRATING ORAL EVERY 6 HOURS PRN
Status: DISCONTINUED | OUTPATIENT
Start: 2018-07-31 | End: 2018-08-03 | Stop reason: HOSPADM

## 2018-07-31 RX ORDER — OLANZAPINE 5 MG/1
10 TABLET ORAL EVERY MORNING
COMMUNITY
End: 2018-09-21

## 2018-07-31 RX ORDER — CLONAZEPAM 1 MG/1
2 TABLET ORAL AT BEDTIME
Status: DISCONTINUED | OUTPATIENT
Start: 2018-07-31 | End: 2018-08-03 | Stop reason: HOSPADM

## 2018-07-31 RX ORDER — OLANZAPINE 5 MG/1
5 TABLET ORAL AT BEDTIME
Status: DISCONTINUED | OUTPATIENT
Start: 2018-07-31 | End: 2018-08-03 | Stop reason: HOSPADM

## 2018-07-31 RX ORDER — CIPROFLOXACIN 500 MG/5ML
125 KIT ORAL DAILY
Status: DISCONTINUED | OUTPATIENT
Start: 2018-07-31 | End: 2018-08-03 | Stop reason: HOSPADM

## 2018-07-31 RX ORDER — HYDROXYZINE HYDROCHLORIDE 50 MG/1
100 TABLET, FILM COATED ORAL DAILY
Status: DISCONTINUED | OUTPATIENT
Start: 2018-07-31 | End: 2018-08-03 | Stop reason: HOSPADM

## 2018-07-31 RX ORDER — ONDANSETRON 2 MG/ML
4 INJECTION INTRAMUSCULAR; INTRAVENOUS EVERY 6 HOURS PRN
Status: DISCONTINUED | OUTPATIENT
Start: 2018-07-31 | End: 2018-08-03 | Stop reason: HOSPADM

## 2018-07-31 RX ORDER — MIRTAZAPINE 15 MG/1
30 TABLET, FILM COATED ORAL AT BEDTIME
Status: DISCONTINUED | OUTPATIENT
Start: 2018-07-31 | End: 2018-08-03 | Stop reason: HOSPADM

## 2018-07-31 RX ORDER — OLANZAPINE 5 MG/1
10 TABLET ORAL EVERY MORNING
Status: DISCONTINUED | OUTPATIENT
Start: 2018-07-31 | End: 2018-08-03 | Stop reason: HOSPADM

## 2018-07-31 RX ORDER — CYCLOBENZAPRINE HCL 10 MG
10 TABLET ORAL AT BEDTIME
Status: ON HOLD | COMMUNITY
End: 2018-07-31

## 2018-07-31 RX ORDER — FLUCONAZOLE 150 MG/1
150 TABLET ORAL ONCE
Status: COMPLETED | OUTPATIENT
Start: 2018-07-31 | End: 2018-07-31

## 2018-07-31 RX ORDER — ONDANSETRON 2 MG/ML
4 INJECTION INTRAMUSCULAR; INTRAVENOUS
Status: DISCONTINUED | OUTPATIENT
Start: 2018-07-31 | End: 2018-07-31

## 2018-07-31 RX ORDER — CLONIDINE HYDROCHLORIDE 0.1 MG/1
0.1 TABLET ORAL 2 TIMES DAILY
Status: DISCONTINUED | OUTPATIENT
Start: 2018-07-31 | End: 2018-08-03 | Stop reason: HOSPADM

## 2018-07-31 RX ORDER — MORPHINE SULFATE 4 MG/ML
4 INJECTION, SOLUTION INTRAMUSCULAR; INTRAVENOUS
Status: DISCONTINUED | OUTPATIENT
Start: 2018-07-31 | End: 2018-07-31

## 2018-07-31 RX ORDER — CARVEDILOL 6.25 MG/1
6.25 TABLET ORAL 2 TIMES DAILY WITH MEALS
COMMUNITY
End: 2019-01-09

## 2018-07-31 RX ORDER — ELETRIPTAN HYDROBROMIDE 40 MG/1
40 TABLET, FILM COATED ORAL
COMMUNITY
End: 2020-11-28

## 2018-07-31 RX ORDER — NALOXONE HYDROCHLORIDE 0.4 MG/ML
.1-.4 INJECTION, SOLUTION INTRAMUSCULAR; INTRAVENOUS; SUBCUTANEOUS
Status: DISCONTINUED | OUTPATIENT
Start: 2018-07-31 | End: 2018-08-03 | Stop reason: HOSPADM

## 2018-07-31 RX ORDER — LANOLIN ALCOHOL/MO/W.PET/CERES
400 CREAM (GRAM) TOPICAL DAILY
COMMUNITY

## 2018-07-31 RX ORDER — OLANZAPINE 5 MG/1
5 TABLET ORAL AT BEDTIME
COMMUNITY
End: 2018-09-17

## 2018-07-31 RX ORDER — SODIUM CHLORIDE, SODIUM LACTATE, POTASSIUM CHLORIDE, CALCIUM CHLORIDE 600; 310; 30; 20 MG/100ML; MG/100ML; MG/100ML; MG/100ML
INJECTION, SOLUTION INTRAVENOUS CONTINUOUS
Status: DISCONTINUED | OUTPATIENT
Start: 2018-07-31 | End: 2018-08-02

## 2018-07-31 RX ORDER — FOLIC ACID 1 MG/1
1 TABLET ORAL DAILY
Status: DISCONTINUED | OUTPATIENT
Start: 2018-07-31 | End: 2018-08-03 | Stop reason: HOSPADM

## 2018-07-31 RX ORDER — FOLIC ACID 1 MG/1
1 TABLET ORAL DAILY
Status: DISCONTINUED | OUTPATIENT
Start: 2018-07-31 | End: 2018-07-31

## 2018-07-31 RX ORDER — CARVEDILOL 6.25 MG/1
6.25 TABLET ORAL 2 TIMES DAILY
Status: DISCONTINUED | OUTPATIENT
Start: 2018-07-31 | End: 2018-08-01

## 2018-07-31 RX ORDER — ACETAMINOPHEN 325 MG/1
975 TABLET ORAL EVERY 8 HOURS PRN
Status: DISCONTINUED | OUTPATIENT
Start: 2018-07-31 | End: 2018-08-03 | Stop reason: HOSPADM

## 2018-07-31 RX ORDER — LANOLIN ALCOHOL/MO/W.PET/CERES
100 CREAM (GRAM) TOPICAL DAILY
Status: DISCONTINUED | OUTPATIENT
Start: 2018-07-31 | End: 2018-08-03 | Stop reason: HOSPADM

## 2018-07-31 RX ORDER — CIPROFLOXACIN 250 MG/1
125 TABLET, FILM COATED ORAL DAILY
Status: DISCONTINUED | OUTPATIENT
Start: 2018-07-31 | End: 2018-07-31

## 2018-07-31 RX ORDER — LEVOTHYROXINE SODIUM 50 UG/1
50 TABLET ORAL DAILY
Status: DISCONTINUED | OUTPATIENT
Start: 2018-07-31 | End: 2018-08-03 | Stop reason: HOSPADM

## 2018-07-31 RX ORDER — IOPAMIDOL 755 MG/ML
500 INJECTION, SOLUTION INTRAVASCULAR ONCE
Status: COMPLETED | OUTPATIENT
Start: 2018-07-31 | End: 2018-07-31

## 2018-07-31 RX ORDER — SODIUM CHLORIDE 9 MG/ML
INJECTION, SOLUTION INTRAVENOUS CONTINUOUS
Status: DISCONTINUED | OUTPATIENT
Start: 2018-07-31 | End: 2018-07-31

## 2018-07-31 RX ORDER — LORAZEPAM 1 MG/1
1-2 TABLET ORAL EVERY 30 MIN PRN
Status: DISCONTINUED | OUTPATIENT
Start: 2018-07-31 | End: 2018-08-02

## 2018-07-31 RX ORDER — MORPHINE SULFATE 2 MG/ML
2-4 INJECTION, SOLUTION INTRAMUSCULAR; INTRAVENOUS
Status: DISCONTINUED | OUTPATIENT
Start: 2018-07-31 | End: 2018-08-02

## 2018-07-31 RX ORDER — LORAZEPAM 2 MG/ML
1-2 INJECTION INTRAMUSCULAR EVERY 30 MIN PRN
Status: DISCONTINUED | OUTPATIENT
Start: 2018-07-31 | End: 2018-08-02

## 2018-07-31 RX ADMIN — MIRTAZAPINE 30 MG: 15 TABLET, FILM COATED ORAL at 21:03

## 2018-07-31 RX ADMIN — FLUCONAZOLE 150 MG: 150 TABLET ORAL at 17:44

## 2018-07-31 RX ADMIN — CYCLOBENZAPRINE HYDROCHLORIDE 5 MG: 5 TABLET, FILM COATED ORAL at 21:02

## 2018-07-31 RX ADMIN — OLANZAPINE 10 MG: 5 TABLET, FILM COATED ORAL at 14:47

## 2018-07-31 RX ADMIN — OLANZAPINE 5 MG: 5 TABLET, FILM COATED ORAL at 21:03

## 2018-07-31 RX ADMIN — CARVEDILOL 6.25 MG: 6.25 TABLET, FILM COATED ORAL at 17:12

## 2018-07-31 RX ADMIN — CLONAZEPAM 2 MG: 1 TABLET ORAL at 21:02

## 2018-07-31 RX ADMIN — CLONIDINE HYDROCHLORIDE 0.1 MG: 0.1 TABLET ORAL at 14:46

## 2018-07-31 RX ADMIN — SODIUM CHLORIDE 56 ML: 900 INJECTION, SOLUTION INTRAVENOUS at 08:32

## 2018-07-31 RX ADMIN — Medication 100 MG: at 13:29

## 2018-07-31 RX ADMIN — MORPHINE SULFATE 4 MG: 4 INJECTION INTRAVENOUS at 09:28

## 2018-07-31 RX ADMIN — MORPHINE SULFATE 4 MG: 2 INJECTION, SOLUTION INTRAMUSCULAR; INTRAVENOUS at 21:07

## 2018-07-31 RX ADMIN — LEVOTHYROXINE SODIUM 50 MCG: 50 TABLET ORAL at 14:47

## 2018-07-31 RX ADMIN — MORPHINE SULFATE 4 MG: 4 INJECTION INTRAVENOUS at 08:08

## 2018-07-31 RX ADMIN — MULTIPLE VITAMINS W/ MINERALS TAB 1 TABLET: TAB at 13:29

## 2018-07-31 RX ADMIN — CIPROFLOXACIN 125 MG: KIT at 17:10

## 2018-07-31 RX ADMIN — CLONIDINE HYDROCHLORIDE 0.1 MG: 0.1 TABLET ORAL at 21:02

## 2018-07-31 RX ADMIN — MORPHINE SULFATE 4 MG: 2 INJECTION, SOLUTION INTRAMUSCULAR; INTRAVENOUS at 14:46

## 2018-07-31 RX ADMIN — IOPAMIDOL 61 ML: 755 INJECTION, SOLUTION INTRAVENOUS at 08:32

## 2018-07-31 RX ADMIN — ONDANSETRON 4 MG: 2 INJECTION INTRAMUSCULAR; INTRAVENOUS at 08:08

## 2018-07-31 RX ADMIN — ONDANSETRON 4 MG: 2 INJECTION INTRAMUSCULAR; INTRAVENOUS at 12:41

## 2018-07-31 RX ADMIN — PANTOPRAZOLE SODIUM 40 MG: 40 INJECTION, POWDER, FOR SOLUTION INTRAVENOUS at 13:30

## 2018-07-31 RX ADMIN — FOLIC ACID 1 MG: 1 TABLET ORAL at 13:29

## 2018-07-31 RX ADMIN — SODIUM CHLORIDE, POTASSIUM CHLORIDE, SODIUM LACTATE AND CALCIUM CHLORIDE: 600; 310; 30; 20 INJECTION, SOLUTION INTRAVENOUS at 12:41

## 2018-07-31 RX ADMIN — MORPHINE SULFATE 4 MG: 2 INJECTION, SOLUTION INTRAMUSCULAR; INTRAVENOUS at 17:41

## 2018-07-31 RX ADMIN — HYDROXYZINE HYDROCHLORIDE 100 MG: 50 TABLET, FILM COATED ORAL at 14:47

## 2018-07-31 RX ADMIN — SODIUM CHLORIDE, POTASSIUM CHLORIDE, SODIUM LACTATE AND CALCIUM CHLORIDE: 600; 310; 30; 20 INJECTION, SOLUTION INTRAVENOUS at 19:56

## 2018-07-31 RX ADMIN — MORPHINE SULFATE 4 MG: 2 INJECTION, SOLUTION INTRAMUSCULAR; INTRAVENOUS at 12:42

## 2018-07-31 ASSESSMENT — ENCOUNTER SYMPTOMS
VOMITING: 1
BACK PAIN: 0
DIARRHEA: 1
ABDOMINAL PAIN: 1
FEVER: 0
CHILLS: 0
NAUSEA: 1

## 2018-07-31 ASSESSMENT — ACTIVITIES OF DAILY LIVING (ADL)
ADLS_ACUITY_SCORE: 13

## 2018-07-31 NOTE — ED TRIAGE NOTES
Patient presents with diffuse abdominal pain since 0300. Patient states she has also had nausea, vomiting and diarrhea. ABCDs intact, alert and oriented x 4.

## 2018-07-31 NOTE — ED NOTES
"[facility]  ED Nurse Handoff Report    Chief Complaint:     Abdominal Pain        HPI   Ana Laura Wharton is a 47 year old female with a history of anxiety, depression, GERD, C. Diff, hypertension, and hypothyroidism who presents to the ED today with abdominal pain. The patient reports that at around 0300 this morning, she began to have some mid abdominal pain. She notes that this pain has been constant since its onset and has gotten worse since she first noticed it. The patient also reports that she has had some diarrhea and had an episode of vomiting about 45 minutes ago. She states that she has never had pain like this in the past. The patient denies any back pain, fevers, chills, or any history of abdominal surgeries.      ED Chief complaint: Abdominal Pain  ED Diagnosis:   Final diagnoses:   Alcohol-induced acute pancreatitis, unspecified complication status   Nausea and vomiting, intractability of vomiting not specified, unspecified vomiting type     Allergies:   Allergies   Allergen Reactions     Amoxicillin-Pot Clavulanate      PN: LW Reaction: Itching, Pruritis     Azithromycin      Other reaction(s): Dermatitis     Cephalexin      PN: LW Reaction: Itching, Pruritis     Ciprofloxacin Other (See Comments)     Joint pain cdiff     Compazine [Prochlorperazine] Other (See Comments)     dystonia     Metoclopramide Other (See Comments)     \"I feel like I am crawling out of my skin\"     Minocycline Nausea and Vomiting     PN: DIARRHEA, VOMITING, AND STOMACH CRAMPS     Penicillins Itching     Reglan [Metoclopramide Hcl] Other (See Comments)     Sensation of \"crawling out of skin\"     Restoril [Temazepam]      Thorazine [Chlorpromazine] Other (See Comments)     dystonia     Abilify [Aripiprazole] Rash     Lamotrigine Rash     Severe drug rash - contraindication to receiving again. Skin peeling.      Sulfa Drugs Rash       Code Status: Full Code  Activity level - Baseline/Home:  Independent. Activity " Level - Current:   Stand with Assist. Lift room needed: No. Bariatric: No   Needed: No   Isolation: No. Infection: Not Applicable.     Vital Signs:   Vitals:    07/31/18 0930 07/31/18 0945 07/31/18 1000 07/31/18 1015   BP: (!) 159/101 (!) 146/116 (!) 150/106 (!) 149/101   Resp:       Temp:       TempSrc:       SpO2: 98% 98% 98%        Cardiac Rhythm:      Pain level: 0-10 Pain Scale: 8  Patient confused: No. Patient Falls Risk: Yes.   Elimination Status: Has voided   Patient Report - Initial Complaint: Abdominal Pain  Focused Assessment:   07:35 Gastrointestinal Gastrointestinal - GI WDL:  WDL except; nausea and vomiting; GI symptoms Nausea/Vomiting Signs/Symptoms: nausea continuous; emesis GI Signs/Symptoms: abdominal pain; abdominal discomfort Gastrointestinal Comment: Patient presents with diffuse abdominal pain since 0300 this morning. Nausea and vomiting with diarrhea, 10/10 pain.        Tests Performed: CT Abdomen/Pelvis, UA, Blood Work  Abnormal Results:   Labs Ordered and Resulted from Time of ED Arrival Up to the Time of Departure from the ED   COMPREHENSIVE METABOLIC PANEL - Abnormal; Notable for the following:        Result Value    Glucose 132 (*)     Calcium 7.9 (*)     Alkaline Phosphatase 175 (*)     AST 58 (*)     All other components within normal limits   LIPASE - Abnormal; Notable for the following:     Lipase 1354 (*)     All other components within normal limits   ROUTINE UA WITH MICROSCOPIC - Abnormal; Notable for the following:     Blood Urine Moderate (*)     Nitrite Urine Positive (*)     Squamous Epithelial /HPF Urine 2 (*)     Mucous Urine Present (*)     All other components within normal limits   CBC WITH PLATELETS DIFFERENTIAL   ISTAT HCG QUANTITATIVE PREGNANCY NURSING POCT   ISTAT HCG QUANTITATIVE PREGNANCY POCT   URINE CULTURE AEROBIC BACTERIAL     CT Abdomen Pelvis w Contrast   Final Result   IMPRESSION:   1. Focal inflammation and bowel wall edema involving the descending    and transverse portion of the duodenum along with surrounding   mesenteric inflammation. Findings could be related to an infectious or   inflammatory process of the duodenum versus duodenal ulcer disease or   pancreatitis. Followup endoscopy as needed for further evaluation.   Correlate with pancreatic enzymes to exclude the possibility of   pancreatitis. Pancreas is otherwise unremarkable.   2. Colonic wall thickening and mucosal enhancement sigmoid colon and   rectum raising concern for an infectious or inflammatory colitis.   Remainder of the bowel is unremarkable.   3. Fatty infiltration of the liver.   4. Stable low-attenuation nodular mass near the gastric fundus as   previously described. Gastrointestinal stromal tumor, gastrointestinal   duplication cyst or gastric leiomyoma are possible etiologies.      KALPESH LEONARD MD        Treatments provided: Morphine x 2, Zofran.   Family Comments: No family at bedside.  OBS brochure/video discussed/provided to patient:  N/A  ED Medications:   Medications   ondansetron (ZOFRAN) injection 4 mg (4 mg Intravenous Given 7/31/18 0808)   morphine (PF) injection 4 mg (4 mg Intravenous Given 7/31/18 0928)   pantoprazole (PROTONIX) 40 mg IV push injection (not administered)   iopamidol (ISOVUE-370) solution 500 mL (61 mLs Intravenous Given 7/31/18 0832)   0.9% sodium chloride BOLUS (0 mLs Intravenous Stopped 7/31/18 0843)     Drips infusing:  No  For the majority of the shift, the patient's behavior Green. Interventions performed were N/A.     Severe Sepsis OR Septic Shock Diagnosis Present: No      ED Nurse Name/Phone Number: Yomaira Soriano,   10:42 AM  RECEIVING UNIT ED HANDOFF REVIEW    Above ED Nurse Handoff Report was reviewed: Yes  Reviewed by: Luzma Dean on July 31, 2018 at 11:02 AM

## 2018-07-31 NOTE — PLAN OF CARE
Problem: Patient Care Overview  Goal: Plan of Care/Patient Progress Review  Outcome: No Change  Pt a/o x4, c/o of pain 8/10 upper abd described as sharp, shooting and constant. Pt given 4mg IV Morphine sulfate with good relief 5/10, pt goal for pain is 5/10. Pt lipase [id number]. Pt given Zofran 4mg IV for nausea with good relief. Pt was orientated to room and protocol. Pt instructed not to get out of be on her own, pt verbalized understanding, no bed alarm at this time. Pt was instructed of nothing to eat by mouth except med and ice chips. This writer told her to take only small amounts of ice chips. Pt made aware of GI consult. Pt stated her last drink was last evening around 6:00pm. Pt states she drinks only 3-4 times a week and 3 drinks at a time. Ciwa score 1, for nausea.  Pt recently lost her father and going through a divorce.

## 2018-07-31 NOTE — ED PROVIDER NOTES
History     Chief Complaint:    Abdominal Pain      HPI   Ana Laura Wharton is a 47 year old female with a history of anxiety, depression, GERD, C. Diff, hypertension, and hypothyroidism who presents to the ED today with abdominal pain. The patient reports that at around 0300 this morning, she began to have some mid abdominal pain. She notes that this pain has been constant since its onset and has gotten worse since she first noticed it. The patient also reports that she has had some diarrhea and had an episode of vomiting about 45 minutes ago. She states that she has never had pain like this in the past. The patient denies any back pain, fevers, chills, or any history of abdominal surgeries.       Allergies:  Amoxicillin-Pot clavulanate   Azithromycin   Cephalexin   Ciprofloxacin   Compazine   Metoclopramide   Minocycline   Penicillins   Reglan   Restoril   Thorazine   Abilify   Lamotrigine   Sulfa drugs      Medications:    Coreg  Cipro   Klonopin   Catapres   Flexeril   Folvite   Vistaril   Levothyroxine   Remeron   Zyprexa   Zofran   Protonix      Past Medical History:    Anxiety   Bipolar disorder   C. Diff  Depressive disorder   Hypertension   GERD   Hypothyroidism   Migraine   Suicide attempt    Past Surgical History:    Arthroscopy knee   Cervix surgery   Left shoulder surgery   Thoracic surgery     Family History:    Depression   Hyperlipidemia   Macular degeneration     Social History:  Marital Status:    Presents to the ED alone   Tobacco Use: current some day smoker   Alcohol Use: yes   PCP: Ander Fuchs       Review of Systems   Constitutional: Negative for chills and fever.   Gastrointestinal: Positive for abdominal pain, diarrhea, nausea and vomiting.   Musculoskeletal: Negative for back pain.   All other systems reviewed and are negative.      Physical Exam     Patient Vitals for the past 24 hrs:   BP Temp Temp src Heart Rate Resp SpO2   07/31/18 0852 - - - - - 97 %   07/31/18 0815 (!)  174/113 - - - - 96 %   07/31/18 0727 (!) 192/114 97.9  F (36.6  C) Oral 106 18 99 %        Physical Exam  Constitutional: Alert, attentive, moderate pain distress  HENT:    Nose: Nose normal.    Mouth/Throat: Oropharynx is clear, mucous membranes are moist   Eyes: EOM are normal.   CV: regular rate and rhythm; no murmurs, rubs or gallups  Chest: Effort normal and breath sounds normal.   GI:  There is moderate periumbilical tenderness. No distension. Normal bowel sounds  MSK: Normal range of motion.   Neurological: Alert, attentive  Skin: Skin is warm and dry.      Emergency Department Course   Imaging:  CT abdomen pelvis w contrast   1. Focal inflammation and bowel wall edema involving the descending  and transverse portion of the duodenum along with surrounding  mesenteric inflammation. Findings could be related to an infectious or  inflammatory process of the duodenum versus duodenal ulcer disease or  pancreatitis. Follow up endoscopy as needed for further evaluation.  Correlate with pancreatic enzymes to exclude the possibility of  pancreatitis. Pancreas is otherwise unremarkable.  2. Colonic wall thickening and mucosal enhancement sigmoid colon and  rectum raising concern for an infectious or inflammatory colitis.  Remainder of the bowel is unremarkable.  3. Fatty infiltration of the liver.  4. Stable low-attenuation nodular mass near the gastric fundus as  previously described. Gastrointestinal stromal tumor, gastrointestinal  duplication cyst or gastric leiomyoma are possible etiologies.  Preliminary radiology read.     Radiographic findings were communicated with the patient who voiced understanding of the findings.    Laboratory:  ISTAT HCG Quantitative Pregnancy POCT (0816): <5.0   CBC:  WBC 9.6, HGB 13.3, , otherwise WNL   CMP: glucose 132 (H), calcium 7.9 (L), alk phos 175 (H), AST 58 (H), otherwise WNL (Creatinine 0.65)   Lipase: 1354 (H)   UA: Clear yellow urine, blood moderate, nitrite positive,  squamous epithelial 2 (H), mucous present, otherwise WNL   Urine culture: pending     Interventions:  (0808) Zofran 4 mg, IV   (0808) Morphine 4 mg, IV   (0928) Morphine 4 mg, IV       Emergency Department Course:  Nursing notes and vitals reviewed.  (0728) I performed an exam of the patient as documented above.    A peripheral IV was established. Blood was drawn from the patient. This was sent for laboratory testing, findings above.    Urine sample was obtained and sent for laboratory analysis, findings above.   The patient was sent for an abdomen/pelvis CT while in the emergency department, findings above.    (0937) I rechecked on the patient and updated them on results.  The patient states that she is feeling better at this time.   Findings and plan explained to the patient who consents to admission.   (0952) I discussed the patient with Dr. Sesay of the hospitalist service, who will admit the patient to a med/surg bed for further monitoring, evaluation, and treatment.   Impression & Plan    Medical Decision Making:  Ana Laura Wharton is a 47 year old female who presents with who presents for evaluation of periumbilical abdominal pain and vomiting since early this morning.  She does have a history of alcohol dependence and states she drank more alcohol than usual recently.  She does have moderate pain to palpation but is otherwise nontoxic.  There are no current signs or symptoms of alcohol withdrawal.  CT does show evidence of probable pancreatitis but also notes other nonspecific findings of possible duodenitis and colonic wall thickening-this could be related to pancreatitis.  Labs show elevated lipase consistent with pancreatitis.  Her pain is improved here but given the above she will be admitted to Med/Surg with plan for NPO, supportive cares, GI consult and probable ultrasound.    Diagnosis:    ICD-10-CM    1. Alcohol-induced acute pancreatitis, unspecified complication status K85.20    2. Nausea and  vomiting, intractability of vomiting not specified, unspecified vomiting type R11.2        Disposition:  Admitted to hospitalist.     I, Radha Hyde, am serving as a scribe on 7/31/2018 at 7:28 AM to personally document services performed by Dr. Coughlin based on my observations and the provider's statements to me.    7/31/2018   [facility] EMERGENCY DEPARTMENT       Edgar Coughlin MD  07/31/18 1132

## 2018-07-31 NOTE — H&P
Admitted:     07/31/2018      PRIMARY CARE PROVIDER:  Katheryn Daigle MD      CHIEF COMPLAINT:  Mid abdominal pain.      HISTORY OF PRESENT ILLNESS:  Ms. Ana Laura Wharton is a 47-year-old female with a history of anxiety, depression, previous suicide attempt, bipolar disorder as well as a history of C. diff, status post fecal transplant, hypertension, hypothyroidism and previous admission to outside facility for acute pancreatitis (suspect related to alcohol dependence) who presents to the Emergency Room with less than 24-hour history of epigastric and mid abdominal pain that started around midnight.  History is obtained by speaking with the ER physician, patient interview and chart review.  The patient states that she has been in her normal state of health until midnight when she started to have severe epigastric and mid abdominal pain that she describes as a sharp stabbing sensation with some radiation to the back.  She had had a couple episodes of nausea and vomiting, but no fevers or chills.  She also had an episode of loose stool but no blood.  She states that she has not had any recent illness, but she does endorse drinking more alcohol than usual, about 4-5 drinks nightly, because her divorce just went through this week.        She was admitted to outside facility back in 05/2017 for acute pancreatitis confirmed by CT imaging.  She has a known mildly hyperdense lesion around the lesser curvature of her stomach seen on CT at that time which had been previously followed up.  Her last EGD was in 06/2017.  At that time she had a totally normal esophagus and stomach and duodenum.  Biopsies were taken at that time to rule out celiac disease, the results were negative for celiac disease.  She has not had any further episodes of pancreatitis that she is aware of.  She has known history of cholelithiasis.  She has had outpatient ultrasound in 05/2017 that showed negative gallbladder with no stones.        Upon arrival to  the Emergency Room she was afebrile.  She was fairly tachycardic at 192/114.  Her pain improved with morphine and antiemetics.  She was started on IV fluids.  Labs are significant for elevated lipase of 1354 with minimally elevated LFTs, but unremarkable CBC and BMP.  CT scan of the abdomen and pelvis shows a focal inflammation and bowel wall edema involving the descending and transverse portion of the duodenum along with surrounding mesenteric inflammation.  Findings could be related to infection or inflammatory process of the duodenum versus duodenal ulcer disease versus reactive pancreatitis.  The patient will be admitted to the hospital for further evaluation and management for acute pancreatitis.      PAST MEDICAL HISTORY:     1.  Bipolar disorder.     2.  Depression.     3.  Previous admission for acute pancreatitis   4.  Normocytic anemia, multifactorial related to chronic alcohol dependence.   5.  Hypertension.   6.  Chronic back pain.     7.  Spontaneous pneumothorax x 3, resolved with pleurodesis.     8.  History of migraine headaches.   9.  Hypothyroidism.   10.  GERD.   11.  Previous history of C. diff colitis, status post fecal transplant.   12.  Frequent UTIs maintained on prophylactic antibiotics.   13.  Previous history of suicide attempts with her most recent in 12/2017.      PAST SURGICAL HISTORY:  Includes pleurodesis and lobe resection, left shoulder surgery, left knee surgery.  She is status post endoscopy in 05/2017.  Last colonoscopy several years ago in which they did the fecal transplant.      FAMILY HISTORY:  Reviewed and noncontributory to this admission.      SOCIAL HISTORY:  The patient is recently .  She previously quit smoking for 30 years but now started back smoking a pack a day for the last year again, due to her recent divorce.  She does admit to drinking alcohol.  Usually, she states she drinks at least 2 glasses of wine nightly 4-5 days out of the week; however, in the  last 2-3 days she has been drinking at least 4 glasses of wine nightly due to her recent divorce.  She denies any previous history of admission to the hospital for alcohol withdrawal.  Her last rehab visit for alcohol was when she was 19 years old.  She is otherwise disabled and lives independently in her own home.      REVIEW OF SYSTEMS:  Negative for any recent fevers, no recent URI symptoms, no melena, hematochezia or hematemesis.  No urinary symptoms recently per patient.  No suicidal thoughts. Otherwise 12-point systems reviewed and are all negative beyond that stated in HPI.      PHYSICAL EXAMINATION:   VITAL SIGNS:  T-max 97.9 with a heart rate of 106, blood pressure 140/99, respirations 18, saturating 98% on room air.   GENERAL:  The patient is alert, oriented.  She is in no acute distress, no visible tremors.   HEENT:  Pupils are round, reactive to light.  Anicteric sclerae.  Conjunctivae pink.  Oral mucosa is pink and moist.  There is no tongue fasciculation.   NECK:  Supple.  No cervical lymphadenopathy or thyromegaly.  Trachea is midline.   CARDIAC:  Regular rate and rhythm, normal S2 with loud murmurs appreciated.   PULMONARY:  Exam is clear to auscultation bilaterally with no wheezing, rales or rhonchi.  No use of accessory muscles or intercostal retraction.   ABDOMEN:  Bowel sounds are decreased, soft.  She has tenderness to palpation over the epigastrium and mid abdomen, but no rebound, guarding or peritoneal sign.  No significant right upper quadrant tenderness.   EXTREMITIES:  Reveal no clubbing, cyanosis or edema.   NEUROLOGIC:  Cranial II-XII intact.  She has bilateral symmetric upper and lower extremity strength.  Sensation intact distally.  She has no focal deficits.   PSYCHIATRIC:  Mood and affect are appropriate.      LABORATORY DATA:  Again, as described above.      ASSESSMENT AND PLAN:  Ms. Ana Laura Wharton is a 47-year-old female with a complicated psychiatric history including depression,  anxiety, bipolar disorder, previous suicide attempts who also has had a history of alcohol dependence and opiate dependence who was admitted to outside hospital a year ago for acute pancreatitis, who comes back to the Emergency Room again today with acute epigastric and mid abdominal pain that started around midnight with associated nausea and vomiting.  Here workup in the Emergency Room shows elevated lipase and minimally elevated LFTs suggestive of pancreatitis.  CT scan of the abdomen shows some inflammation around the descending and transverse portion of the duodenum with surrounding mesenteric inflammation that could suggest inflammatory, infectious process of the duodenum versus pancreatitis.  Given her recent increase in alcohol intake, I am inclined to think that this is likely an exacerbation of her pancreatitis due to alcohol.  She will be admitted to the hospital for further evaluation and management.   1.  Acute pancreatitis:  I suspect she is likely having recurrent flare of alcohol pancreatitis due to increased alcohol consumption.  CT scan finding shows inflammatory changes around the duodenum that could be related to the duodenum versus pancreatitis inflammatory changes.  She does not have too much risk factors for peptic ulcer or duodenal ulcer disease except for smoking.  She denies chronic NSAID use.  There is no history of melena, hematochezia.  Last EGD done in outside facility was in 05/2017, which was unremarkable.  At this time, I will place her on n.p.o. except for meds and ice chips.  She will be resuscitated with IV fluids.  Will follow LFTs and lipase and labs daily.  I will place her on a PPI IV and get GI evaluation to see if further workup such as an EUS and/or endoscopy might be indicated in the future.  My suspicion for biliary causes of pancreatitis is low.  She has no right upper quadrant pain.  Last ultrasound performed a year ago found in [facility] shows no presence of  gallstone.  I will hold off on right upper quadrant ultrasound for now.   2.  Abnormal urinalysis:  The patient does have positive nitrite in the urine but negative leukocyte esterase and only 4 white cells.  She is chronically on daily Cipro for UTI prophylaxis.  I would like to avoid giving her antibiotics, especially in the setting of recurrent C. diff. We will send off a urine culture and see if it grows anything.   3.  Hypertension:  Blood pressure elevated, likely related to pain.  She will be continued on her usual home medication including clonidine and Coreg.  Blood pressure should improve with improved pain.   4.  Alcohol dependence versus abuse:  Her last drink was 8 p.m. yesterday.  No records of alcohol withdrawal on her previous admission but did state issues with alcohol dependence.  I will place her on the CIWA protocol with oral vitamins, IV fluid hydration and p.r.n.  Ativan.  She also has p.r.n. Klonopin at home as well.   5.  Hypothyroidism:  Resume Synthroid.   6.  Bipolar disorder and depression:  Continue Zyprexa and Klonopin.   7.  History of Clostridium difficile infection:  Status post fecal transplant several years ago.  No recent C. diff for the last year.  We will try to avoid antibiotics unless absolutely necessary.   8.  Previous history of suicide attempt:  No suicidal ideation or attempts.  We will ask if the patient will want to see the [place], especially given her recent divorce proceeding.  The patient will be admitted under inpatient status.   9.  FULL CODE.   10.  Deep venous thrombosis prophylaxis:  SCDs.         AIRAM MERCADO MD       As dictated by HAMIDA GAMBOA            D: 2018   T: 2018   MT: EMILY      Name:     KEE TINEO   MRN:      [id number]        Account:      [id number]   :      1970        Admitted:     2018                   Document: [id number]

## 2018-07-31 NOTE — PHARMACY-ADMISSION MEDICATION HISTORY
Admission medication history interview status for this patient is complete. See [facility] admission navigator for allergy information, prior to admission medications and immunization status.     Medication history interview source(s):Patient  Medication history resources (including written lists, pill bottles, clinic record):None  Primary pharmacy: [facility] [place]    Changes made to PTA medication list:  Added:  Fiber, mvi, Relpax & Imodium  Deleted:  None  Changed:  Foloc acid 1mg daily ---> 400mcg daily, Flexeril 5mg at bedtime prn ---> qhs    Actions taken by pharmacist (provider contacted, etc): Called [facility] to verify Relpax dose.     Additional medication history information:None    Medication reconciliation/reorder completed by provider prior to medication history? Yes    Do you take OTC medications (eg tylenol, ibuprofen, fish oil, eye/ear drops, etc)?  Yes, as listed.    For patients on insulin therapy:  No        Prior to Admission medications    Medication Sig Last Dose Taking? Auth Provider   carvedilol (COREG) 6.25 MG tablet Take 6.25 mg by mouth 2 times daily (with meals) 7/30/2018 at   Yes Unknown, Entered By History   ciprofloxacin (CIPRO) 250 MG tablet Take 125 mg by mouth daily For UTI prevention 7/30/2018 at am Yes Unknown, Entered By History   cyclobenzaprine (FLEXERIL) 5 MG tablet Take 5 mg by mouth At Bedtime 7/30/2018 at hs Yes Unknown, Entered By History   eletriptan (RELPAX) 40 MG tablet Take 40 mg by mouth at onset of headache for migraine prn Yes Unknown, Entered By History   FIBER PO Take 2 tablets by mouth daily 7/30/2018 at   Yes Unknown, Entered By History   folic acid (FOLVITE) 400 MCG tablet Take 400 mcg by mouth daily 7/30/2018 at   Yes Unknown, Entered By History   loperamide (IMODIUM A-D) 2 MG tablet Take 4 mg by mouth as needed for diarrhea prn Yes Unknown, Entered By History   Multiple Vitamins-Minerals (WOMENS MULTI VITAMIN & MINERAL PO) Take 1 tablet by mouth daily  7/30/2018 Yes Unknown, Entered By History   OLANZapine (ZYPREXA) 5 MG tablet Take 10 mg by mouth every morning 7/30/2018 at am Yes Unknown, Entered By History   OLANZapine (ZYPREXA) 5 MG tablet Take 5 mg by mouth At Bedtime 7/30/2018 at hs Yes Unknown, Entered By History   acetaminophen (TYLENOL) 325 MG tablet Take 3 tablets (975 mg) by mouth every 8 hours as needed for mild pain or fever prn  Kube, Afanwi Lumsina, APRN CNP   clonazePAM (KLONOPIN) 1 MG tablet Take 2 tablets (2 mg) by mouth At Bedtime 7/30/2018 at pm  Jaden Rodriguez MD   cloNIDine (CATAPRES) 0.1 MG tablet Take 0.1 mg by mouth 2 times daily 7/30/2018 at pm  Unknown, Entered By History   hydrOXYzine (VISTARIL) 50 MG capsule Take 2 capsules (100 mg) by mouth daily 7/30/2018 at am  Jaden Rodriguez MD   IBUPROFEN PO Take 400-600 mg by mouth every 6 hours as needed for moderate pain prn  Unknown, Entered By History   Levothyroxine Sodium (SYNTHROID PO) Take 50 mcg by mouth daily 7/30/2018 at am  Unknown, Entered By History   mirtazapine (REMERON) 30 MG tablet Take 1 tablet (30 mg) by mouth At Bedtime 7/30/2018 at hs  Jaden Rodriguez MD   Ondansetron (ZOFRAN ODT PO) Take 4 mg by mouth every 8 hours as needed for nausea prn  Unknown, Entered By History   Pantoprazole Sodium (PROTONIX PO) Take 40 mg by mouth every morning (before breakfast)  7/30/2018 at am  Reported, Patient

## 2018-08-01 LAB
ALBUMIN SERPL-MCNC: 2.9 G/DL (ref 3.4–5)
ALP SERPL-CCNC: 140 U/L (ref 40–150)
ALT SERPL W P-5'-P-CCNC: 17 U/L (ref 0–50)
ANION GAP SERPL CALCULATED.3IONS-SCNC: 7 MMOL/L (ref 3–14)
AST SERPL W P-5'-P-CCNC: 26 U/L (ref 0–45)
BACTERIA SPEC CULT: NORMAL
BILIRUB DIRECT SERPL-MCNC: 0.2 MG/DL (ref 0–0.2)
BILIRUB SERPL-MCNC: 0.7 MG/DL (ref 0.2–1.3)
BUN SERPL-MCNC: 6 MG/DL (ref 7–30)
CALCIUM SERPL-MCNC: 7.5 MG/DL (ref 8.5–10.1)
CHLORIDE SERPL-SCNC: 108 MMOL/L (ref 94–109)
CO2 SERPL-SCNC: 28 MMOL/L (ref 20–32)
CREAT SERPL-MCNC: 0.7 MG/DL (ref 0.52–1.04)
ERYTHROCYTE [DISTWIDTH] IN BLOOD BY AUTOMATED COUNT: 14.5 % (ref 10–15)
GFR SERPL CREATININE-BSD FRML MDRD: 89 ML/MIN/1.7M2
GLUCOSE SERPL-MCNC: 96 MG/DL (ref 70–99)
HCT VFR BLD AUTO: 31.2 % (ref 35–47)
HGB BLD-MCNC: 10.4 G/DL (ref 11.7–15.7)
LIPASE SERPL-CCNC: 562 U/L (ref 73–393)
Lab: NORMAL
MCH RBC QN AUTO: 32 PG (ref 26.5–33)
MCHC RBC AUTO-ENTMCNC: 33.3 G/DL (ref 31.5–36.5)
MCV RBC AUTO: 96 FL (ref 78–100)
PLATELET # BLD AUTO: 129 10E9/L (ref 150–450)
POTASSIUM SERPL-SCNC: 3.3 MMOL/L (ref 3.4–5.3)
POTASSIUM SERPL-SCNC: 3.7 MMOL/L (ref 3.4–5.3)
PROT SERPL-MCNC: 5.5 G/DL (ref 6.8–8.8)
RBC # BLD AUTO: 3.25 10E12/L (ref 3.8–5.2)
SODIUM SERPL-SCNC: 143 MMOL/L (ref 133–144)
SPECIMEN SOURCE: NORMAL
WBC # BLD AUTO: 5.9 10E9/L (ref 4–11)

## 2018-08-01 PROCEDURE — 25000128 H RX IP 250 OP 636: Performed by: HOSPITALIST

## 2018-08-01 PROCEDURE — 85027 COMPLETE CBC AUTOMATED: CPT | Performed by: PHYSICIAN ASSISTANT

## 2018-08-01 PROCEDURE — 80076 HEPATIC FUNCTION PANEL: CPT | Performed by: PHYSICIAN ASSISTANT

## 2018-08-01 PROCEDURE — 84132 ASSAY OF SERUM POTASSIUM: CPT | Performed by: INTERNAL MEDICINE

## 2018-08-01 PROCEDURE — 25000125 ZZHC RX 250: Performed by: PHYSICIAN ASSISTANT

## 2018-08-01 PROCEDURE — 25000132 ZZH RX MED GY IP 250 OP 250 PS 637: Performed by: HOSPITALIST

## 2018-08-01 PROCEDURE — 25000128 H RX IP 250 OP 636: Performed by: INTERNAL MEDICINE

## 2018-08-01 PROCEDURE — 80048 BASIC METABOLIC PNL TOTAL CA: CPT | Performed by: PHYSICIAN ASSISTANT

## 2018-08-01 PROCEDURE — 83690 ASSAY OF LIPASE: CPT | Performed by: PHYSICIAN ASSISTANT

## 2018-08-01 PROCEDURE — 25000132 ZZH RX MED GY IP 250 OP 250 PS 637: Performed by: INTERNAL MEDICINE

## 2018-08-01 PROCEDURE — 99232 SBSQ HOSP IP/OBS MODERATE 35: CPT | Performed by: INTERNAL MEDICINE

## 2018-08-01 PROCEDURE — 25000132 ZZH RX MED GY IP 250 OP 250 PS 637: Performed by: PHYSICIAN ASSISTANT

## 2018-08-01 PROCEDURE — 36415 COLL VENOUS BLD VENIPUNCTURE: CPT | Performed by: PHYSICIAN ASSISTANT

## 2018-08-01 PROCEDURE — 25000128 H RX IP 250 OP 636: Performed by: PHYSICIAN ASSISTANT

## 2018-08-01 PROCEDURE — 12000007 ZZH R&B INTERMEDIATE

## 2018-08-01 PROCEDURE — 36415 COLL VENOUS BLD VENIPUNCTURE: CPT | Performed by: INTERNAL MEDICINE

## 2018-08-01 RX ORDER — OLANZAPINE 5 MG/1
5 TABLET ORAL AT BEDTIME
Status: DISCONTINUED | OUTPATIENT
Start: 2018-08-01 | End: 2018-08-01

## 2018-08-01 RX ORDER — POTASSIUM CHLORIDE 1.5 G/1.58G
20-40 POWDER, FOR SOLUTION ORAL
Status: DISCONTINUED | OUTPATIENT
Start: 2018-08-01 | End: 2018-08-03 | Stop reason: HOSPADM

## 2018-08-01 RX ORDER — MAGNESIUM SULFATE HEPTAHYDRATE 40 MG/ML
4 INJECTION, SOLUTION INTRAVENOUS EVERY 4 HOURS PRN
Status: DISCONTINUED | OUTPATIENT
Start: 2018-08-01 | End: 2018-08-03 | Stop reason: HOSPADM

## 2018-08-01 RX ORDER — CARVEDILOL 6.25 MG/1
6.25 TABLET ORAL 2 TIMES DAILY WITH MEALS
Status: DISCONTINUED | OUTPATIENT
Start: 2018-08-01 | End: 2018-08-03 | Stop reason: HOSPADM

## 2018-08-01 RX ORDER — SUMATRIPTAN 25 MG/1
25 TABLET, FILM COATED ORAL ONCE
Status: COMPLETED | OUTPATIENT
Start: 2018-08-01 | End: 2018-08-01

## 2018-08-01 RX ORDER — POTASSIUM CL/LIDO/0.9 % NACL 10MEQ/0.1L
10 INTRAVENOUS SOLUTION, PIGGYBACK (ML) INTRAVENOUS
Status: DISCONTINUED | OUTPATIENT
Start: 2018-08-01 | End: 2018-08-03 | Stop reason: HOSPADM

## 2018-08-01 RX ORDER — OLANZAPINE 5 MG/1
10 TABLET ORAL EVERY MORNING
Status: DISCONTINUED | OUTPATIENT
Start: 2018-08-01 | End: 2018-08-01

## 2018-08-01 RX ORDER — MICONAZOLE NITRATE 20 MG/G
CREAM TOPICAL 2 TIMES DAILY
Status: DISCONTINUED | OUTPATIENT
Start: 2018-08-01 | End: 2018-08-03 | Stop reason: HOSPADM

## 2018-08-01 RX ORDER — POTASSIUM CHLORIDE 29.8 MG/ML
20 INJECTION INTRAVENOUS
Status: DISCONTINUED | OUTPATIENT
Start: 2018-08-01 | End: 2018-08-03 | Stop reason: HOSPADM

## 2018-08-01 RX ORDER — POTASSIUM CHLORIDE 7.45 MG/ML
10 INJECTION INTRAVENOUS
Status: DISCONTINUED | OUTPATIENT
Start: 2018-08-01 | End: 2018-08-03 | Stop reason: HOSPADM

## 2018-08-01 RX ORDER — POTASSIUM CHLORIDE 1500 MG/1
20-40 TABLET, EXTENDED RELEASE ORAL
Status: DISCONTINUED | OUTPATIENT
Start: 2018-08-01 | End: 2018-08-03 | Stop reason: HOSPADM

## 2018-08-01 RX ADMIN — MORPHINE SULFATE 4 MG: 2 INJECTION, SOLUTION INTRAMUSCULAR; INTRAVENOUS at 15:46

## 2018-08-01 RX ADMIN — MORPHINE SULFATE 4 MG: 2 INJECTION, SOLUTION INTRAMUSCULAR; INTRAVENOUS at 02:50

## 2018-08-01 RX ADMIN — CLONAZEPAM 2 MG: 1 TABLET ORAL at 21:58

## 2018-08-01 RX ADMIN — CIPROFLOXACIN 125 MG: KIT at 08:18

## 2018-08-01 RX ADMIN — LEVOTHYROXINE SODIUM 50 MCG: 50 TABLET ORAL at 05:19

## 2018-08-01 RX ADMIN — CLONIDINE HYDROCHLORIDE 0.1 MG: 0.1 TABLET ORAL at 21:58

## 2018-08-01 RX ADMIN — SODIUM CHLORIDE, POTASSIUM CHLORIDE, SODIUM LACTATE AND CALCIUM CHLORIDE: 600; 310; 30; 20 INJECTION, SOLUTION INTRAVENOUS at 06:10

## 2018-08-01 RX ADMIN — MORPHINE SULFATE 4 MG: 2 INJECTION, SOLUTION INTRAMUSCULAR; INTRAVENOUS at 00:08

## 2018-08-01 RX ADMIN — FOLIC ACID 1 MG: 1 TABLET ORAL at 08:06

## 2018-08-01 RX ADMIN — SODIUM CHLORIDE, POTASSIUM CHLORIDE, SODIUM LACTATE AND CALCIUM CHLORIDE: 600; 310; 30; 20 INJECTION, SOLUTION INTRAVENOUS at 17:04

## 2018-08-01 RX ADMIN — CARVEDILOL 6.25 MG: 6.25 TABLET, FILM COATED ORAL at 08:06

## 2018-08-01 RX ADMIN — SUMATRIPTAN SUCCINATE 25 MG: 25 TABLET ORAL at 19:44

## 2018-08-01 RX ADMIN — PANTOPRAZOLE SODIUM 40 MG: 40 INJECTION, POWDER, FOR SOLUTION INTRAVENOUS at 08:06

## 2018-08-01 RX ADMIN — OLANZAPINE 10 MG: 5 TABLET, FILM COATED ORAL at 08:06

## 2018-08-01 RX ADMIN — SODIUM CHLORIDE, POTASSIUM CHLORIDE, SODIUM LACTATE AND CALCIUM CHLORIDE: 600; 310; 30; 20 INJECTION, SOLUTION INTRAVENOUS at 01:13

## 2018-08-01 RX ADMIN — SODIUM CHLORIDE, POTASSIUM CHLORIDE, SODIUM LACTATE AND CALCIUM CHLORIDE: 600; 310; 30; 20 INJECTION, SOLUTION INTRAVENOUS at 10:41

## 2018-08-01 RX ADMIN — CARVEDILOL 6.25 MG: 6.25 TABLET, FILM COATED ORAL at 17:02

## 2018-08-01 RX ADMIN — MICONAZOLE NITRATE: 20 CREAM TOPICAL at 23:15

## 2018-08-01 RX ADMIN — POTASSIUM CHLORIDE 20 MEQ: 1500 TABLET, EXTENDED RELEASE ORAL at 10:32

## 2018-08-01 RX ADMIN — SODIUM CHLORIDE, POTASSIUM CHLORIDE, SODIUM LACTATE AND CALCIUM CHLORIDE: 600; 310; 30; 20 INJECTION, SOLUTION INTRAVENOUS at 23:14

## 2018-08-01 RX ADMIN — MORPHINE SULFATE 4 MG: 2 INJECTION, SOLUTION INTRAMUSCULAR; INTRAVENOUS at 13:21

## 2018-08-01 RX ADMIN — MULTIPLE VITAMINS W/ MINERALS TAB 1 TABLET: TAB at 08:05

## 2018-08-01 RX ADMIN — MORPHINE SULFATE 4 MG: 2 INJECTION, SOLUTION INTRAMUSCULAR; INTRAVENOUS at 21:59

## 2018-08-01 RX ADMIN — MIRTAZAPINE 30 MG: 15 TABLET, FILM COATED ORAL at 21:58

## 2018-08-01 RX ADMIN — MORPHINE SULFATE 4 MG: 2 INJECTION, SOLUTION INTRAMUSCULAR; INTRAVENOUS at 19:43

## 2018-08-01 RX ADMIN — OLANZAPINE 5 MG: 5 TABLET, FILM COATED ORAL at 21:58

## 2018-08-01 RX ADMIN — HYDROXYZINE HYDROCHLORIDE 100 MG: 50 TABLET, FILM COATED ORAL at 08:05

## 2018-08-01 RX ADMIN — ACETAMINOPHEN 975 MG: 325 TABLET, FILM COATED ORAL at 21:57

## 2018-08-01 RX ADMIN — MORPHINE SULFATE 4 MG: 2 INJECTION, SOLUTION INTRAMUSCULAR; INTRAVENOUS at 08:07

## 2018-08-01 RX ADMIN — Medication 100 MG: at 08:05

## 2018-08-01 RX ADMIN — CYCLOBENZAPRINE HYDROCHLORIDE 5 MG: 5 TABLET, FILM COATED ORAL at 21:58

## 2018-08-01 RX ADMIN — MORPHINE SULFATE 4 MG: 2 INJECTION, SOLUTION INTRAMUSCULAR; INTRAVENOUS at 17:42

## 2018-08-01 RX ADMIN — ACETAMINOPHEN 975 MG: 325 TABLET, FILM COATED ORAL at 13:33

## 2018-08-01 RX ADMIN — POTASSIUM CHLORIDE 40 MEQ: 1500 TABLET, EXTENDED RELEASE ORAL at 08:19

## 2018-08-01 RX ADMIN — CLONIDINE HYDROCHLORIDE 0.1 MG: 0.1 TABLET ORAL at 08:05

## 2018-08-01 RX ADMIN — MORPHINE SULFATE 4 MG: 2 INJECTION, SOLUTION INTRAMUSCULAR; INTRAVENOUS at 05:19

## 2018-08-01 RX ADMIN — MORPHINE SULFATE 4 MG: 2 INJECTION, SOLUTION INTRAMUSCULAR; INTRAVENOUS at 10:32

## 2018-08-01 ASSESSMENT — ACTIVITIES OF DAILY LIVING (ADL)
ADLS_ACUITY_SCORE: 13

## 2018-08-01 NOTE — PLAN OF CARE
End of Shift Summary.  For vital signs and complete assessments, please see documentation flowsheets.     Pertinent assessments: pt alert and oriented, up with standby assist in the room. Pt NPO except medications. Pt on IV morphine as needed for abdominal pain. Pt not scoring on CIWA, last alcoholic drink Monday at 1800.   Major Shift Events: none  Plan (Upcoming Events): continue current cares, GI consult  Discharge/Transfer Needs: none at this time    Bedside Shift Report Completed :   Bedside Safety Check Completed:

## 2018-08-01 NOTE — PLAN OF CARE
Problem: Pancreatitis, Acute/Chronic (Adult)  Goal: Signs and Symptoms of Listed Potential Problems Will be Absent, Minimized or Managed (Pancreatitis, Acute/Chronic)  Signs and symptoms of listed potential problems will be absent, minimized or managed by discharge/transition of care (reference Pancreatitis, Acute/Chronic (Adult) CPG).   Outcome: No Change  A/O  SBA to transfer. Steady gait.   CIWA score =2   C/O abd pain  Morphine 4mg given x 2 this shift.  VSS afebrile.  LS clear.    Tele SR.  Birth control device implanted in LUE, IV removed from LUE dt discomfort. Pink band applied.

## 2018-08-01 NOTE — PLAN OF CARE
Problem: Patient Care Overview  Goal: Plan of Care/Patient Progress Review  Outcome: No Change  VS stable. A & O x 4.   Tele:sinus rhythm- will discontinue as patient is not receiving haldol to qualify for continuous tele order.   Diet: Changed this afternoon to clear liquids   Ambulates: with SBA.   IV meds: IV Protonix. LR running at 150 ml/hr  Pain:7-8/10 in abdomen. IV morphine given q 2 hours.  Tylenol given every 8 hours.   Abnormal labs:lipase 562, K+ 3.3. Replaced this morning. Pending recheck at 1500.   Discharge plan: not yet in place. Will continue to monitor and review plan of care.

## 2018-08-01 NOTE — PROGRESS NOTES
[facility]  Hospitalist Progress Note  Name: Ana Laura Wharton    MRN: [id number]  YOB: 1970    Age: 47 year old  Date of admission: 7/31/2018  Primary care provider: Katheryn Daigle      Reason for Stay (Diagnosis): Acute pancreatitis         Assessment and Plan:      Summary of Stay:  Ms. Ana Laura Wharton is a 47-year-old female with a complicated psychiatric history including depression, anxiety, bipolar disorder, previous suicide attempts who also has had a history of alcohol dependence and opiate dependence who was admitted to outside hospital a year ago for acute pancreatitis, who comes back to the Emergency Room again today with acute epigastric and mid abdominal pain that started around midnight with associated nausea and vomiting.  Here workup in the Emergency Room shows elevated lipase and minimally elevated LFTs suggestive of pancreatitis.  CT scan of the abdomen shows some inflammation around the descending and transverse portion of the duodenum with surrounding mesenteric inflammation that could suggest inflammatory, infectious process of the duodenum versus pancreatitis.  Clinical findings are consistent with acute alcoholic pancreatitis    Problem List/Plan:  1. Acute pancreatitis: Likely secondary to alcohol.  Overall improved with decreasing lipase.  Okay to advance diet to clear liquids.  Continue IV PPI as I suspect there is a component of gastritis.  GI consultation also ordered although doubt suspicion for biliary related pancreatitis.  2. Alcohol abuse: Recent relapse due to psychosocial stressors.  Educated and counseled to quit.  Patient is not interested in CD consultation at this time.  No evidence of delirium tremens at this time.  Continue the CIWA protocol.  3. History of chronic UTIs: Chronically on Cipro for prophylaxis.  4. Hypothyroidism: Continue Synthroid  5. Bipolar disorder: Continue Zyprexa and Remeron  6. Hypertension: Exacerbated by  "pain.  Continue carvedilol for now.  7. Acute anemia without evidence of acute blood loss anemia: Likely dilutional from aggressive IV fluid resuscitation.  Decrease IV fluids to 150 mL/hour      DVT Prophylaxis: Pneumatic Compression Devices  Code Status: Full Code  Discharge Dispo: Home  Estimated Disch Date / # of Days until Disch: Suspect in 2 days if no further GI workup and able to tolerate adequate p.o.        Interval History (Subjective):      Pain better controlled.  Denies any nausea and tolerated clear liquids.         Physical Exam:      Vital signs:  Temp: 97.6  F (36.4  C) Temp src: Oral BP: (!) 152/92 Pulse: 80 Heart Rate: 81 Resp: 16 SpO2: 92 % O2 Device: None (Room air)     Weight: 54.9 kg (121 lb 1.6 oz)  Estimated body mass index is 21.45 kg/(m^2) as calculated from the following:    Height as of 12/13/17: 1.6 m (5' 3\").    Weight as of this encounter: 54.9 kg (121 lb 1.6 oz).    I/O last 3 completed shifts:  In: 3556.67 [I.V.:3556.67]  Out: 2500 [Urine:2500]  Vitals:    07/31/18 1319   Weight: 54.9 kg (121 lb 1.6 oz)       Constitutional: Awake, alert, cooperative, no apparent distress   Respiratory: Nl work of breathing. Clear to auscultation bilaterally, no crackles or wheezing   Cardiovascular: Regular rate and rhythm, normal S1 and S2, and no murmur noted   Abdomen: Normal bowel sounds, soft, non-distended, non-tender   Skin: No rashes, no cyanosis, dry to touch   Neuro: CN 2-12 intact, no localizing weakness   Extremities: No edema, normal range of motion   HEENT Normocephalic, atraumatic, normal nasal turbinates; oropharynx clear   Neck Supple; nl inspection; trachea midline; no thryomegaly   Psychiatric: A+O x3. Normal affect          Medications:      All current medications were reviewed with changes reflected in problem list.         Data:      All new lab and imaging data was reviewed.   Labs:    Recent Labs  Lab 08/01/18  0656 07/31/18  0733   WBC 5.9 9.6   HGB 10.4* 13.3   HCT 31.2* " 38.4   MCV 96 93   * 210       Recent Labs  Lab 08/01/18  0656 07/31/18  0733    139   POTASSIUM 3.3* 3.8   CHLORIDE 108 101   CO2 28 26   ANIONGAP 7 12   GLC 96 132*   BUN 6* 10   CR 0.70 0.65   GFRESTIMATED 89 >90   GFRESTBLACK >90 >90   ISAURO 7.5* 7.9*   PROTTOTAL 5.5* 7.3   ALBUMIN 2.9* 3.9   BILITOTAL 0.7 0.6   ALKPHOS 140 175*   AST 26 58*   ALT 17 27       Recent Labs  Lab 08/01/18  0656 07/31/18  0733   LIPASE 562* 1354*      Imaging:   No results found for this or any previous visit (from the past 24 hour(s)).    Chance Capps [phone]

## 2018-08-02 ENCOUNTER — ANESTHESIA (OUTPATIENT)
Dept: MEDSURG UNIT | Facility: CLINIC | Age: 48
DRG: 440 | End: 2018-08-02
Payer: COMMERCIAL

## 2018-08-02 ENCOUNTER — ANESTHESIA EVENT (OUTPATIENT)
Dept: MEDSURG UNIT | Facility: CLINIC | Age: 48
DRG: 440 | End: 2018-08-02
Payer: COMMERCIAL

## 2018-08-02 LAB
ALBUMIN UR-MCNC: NEGATIVE MG/DL
ANION GAP SERPL CALCULATED.3IONS-SCNC: 7 MMOL/L (ref 3–14)
APPEARANCE UR: CLEAR
BILIRUB UR QL STRIP: NEGATIVE
BUN SERPL-MCNC: 4 MG/DL (ref 7–30)
CALCIUM SERPL-MCNC: 8.9 MG/DL (ref 8.5–10.1)
CHLORIDE SERPL-SCNC: 107 MMOL/L (ref 94–109)
CO2 SERPL-SCNC: 28 MMOL/L (ref 20–32)
COLOR UR AUTO: YELLOW
CREAT SERPL-MCNC: 0.72 MG/DL (ref 0.52–1.04)
ERYTHROCYTE [DISTWIDTH] IN BLOOD BY AUTOMATED COUNT: 14.8 % (ref 10–15)
GFR SERPL CREATININE-BSD FRML MDRD: 87 ML/MIN/1.7M2
GLUCOSE SERPL-MCNC: 88 MG/DL (ref 70–99)
GLUCOSE UR STRIP-MCNC: NEGATIVE MG/DL
HCT VFR BLD AUTO: 33.1 % (ref 35–47)
HGB BLD-MCNC: 10.9 G/DL (ref 11.7–15.7)
HGB UR QL STRIP: NEGATIVE
KETONES UR STRIP-MCNC: NEGATIVE MG/DL
LEUKOCYTE ESTERASE UR QL STRIP: NEGATIVE
LIPASE SERPL-CCNC: 331 U/L (ref 73–393)
MCH RBC QN AUTO: 32.2 PG (ref 26.5–33)
MCHC RBC AUTO-ENTMCNC: 32.9 G/DL (ref 31.5–36.5)
MCV RBC AUTO: 98 FL (ref 78–100)
NITRATE UR QL: NEGATIVE
PH UR STRIP: 8 PH (ref 5–7)
PLATELET # BLD AUTO: 145 10E9/L (ref 150–450)
POTASSIUM SERPL-SCNC: 3.9 MMOL/L (ref 3.4–5.3)
RBC # BLD AUTO: 3.39 10E12/L (ref 3.8–5.2)
RBC #/AREA URNS AUTO: 1 /HPF (ref 0–2)
SODIUM SERPL-SCNC: 142 MMOL/L (ref 133–144)
SOURCE: ABNORMAL
SP GR UR STRIP: 1 (ref 1–1.03)
SQUAMOUS #/AREA URNS AUTO: 1 /HPF (ref 0–1)
UROBILINOGEN UR STRIP-MCNC: 0 MG/DL (ref 0–2)
WBC # BLD AUTO: 7 10E9/L (ref 4–11)
WBC #/AREA URNS AUTO: 2 /HPF (ref 0–5)

## 2018-08-02 PROCEDURE — 25000132 ZZH RX MED GY IP 250 OP 250 PS 637: Performed by: INTERNAL MEDICINE

## 2018-08-02 PROCEDURE — 99232 SBSQ HOSP IP/OBS MODERATE 35: CPT | Performed by: INTERNAL MEDICINE

## 2018-08-02 PROCEDURE — 83690 ASSAY OF LIPASE: CPT | Performed by: INTERNAL MEDICINE

## 2018-08-02 PROCEDURE — 80048 BASIC METABOLIC PNL TOTAL CA: CPT | Performed by: INTERNAL MEDICINE

## 2018-08-02 PROCEDURE — 81001 URINALYSIS AUTO W/SCOPE: CPT | Performed by: INTERNAL MEDICINE

## 2018-08-02 PROCEDURE — 37000011 ZZH ANESTHESIA WARD SERVICE

## 2018-08-02 PROCEDURE — 12000007 ZZH R&B INTERMEDIATE

## 2018-08-02 PROCEDURE — 25000125 ZZHC RX 250: Performed by: PHYSICIAN ASSISTANT

## 2018-08-02 PROCEDURE — 25000132 ZZH RX MED GY IP 250 OP 250 PS 637: Performed by: PHYSICIAN ASSISTANT

## 2018-08-02 PROCEDURE — 85027 COMPLETE CBC AUTOMATED: CPT | Performed by: INTERNAL MEDICINE

## 2018-08-02 PROCEDURE — 36415 COLL VENOUS BLD VENIPUNCTURE: CPT | Performed by: INTERNAL MEDICINE

## 2018-08-02 PROCEDURE — 40000671 ZZH STATISTIC ANESTHESIA CASE

## 2018-08-02 PROCEDURE — 25000128 H RX IP 250 OP 636: Performed by: PHYSICIAN ASSISTANT

## 2018-08-02 RX ORDER — OXYCODONE AND ACETAMINOPHEN 5; 325 MG/1; MG/1
1 TABLET ORAL EVERY 4 HOURS PRN
Status: DISCONTINUED | OUTPATIENT
Start: 2018-08-02 | End: 2018-08-03 | Stop reason: HOSPADM

## 2018-08-02 RX ORDER — PANTOPRAZOLE SODIUM 40 MG/1
40 TABLET, DELAYED RELEASE ORAL
Status: DISCONTINUED | OUTPATIENT
Start: 2018-08-02 | End: 2018-08-03 | Stop reason: HOSPADM

## 2018-08-02 RX ORDER — LOPERAMIDE HCL 2 MG
2 CAPSULE ORAL 4 TIMES DAILY PRN
Status: DISCONTINUED | OUTPATIENT
Start: 2018-08-02 | End: 2018-08-03 | Stop reason: HOSPADM

## 2018-08-02 RX ADMIN — MULTIPLE VITAMINS W/ MINERALS TAB 1 TABLET: TAB at 08:21

## 2018-08-02 RX ADMIN — OXYCODONE HYDROCHLORIDE AND ACETAMINOPHEN 1 TABLET: 5; 325 TABLET ORAL at 08:21

## 2018-08-02 RX ADMIN — Medication 100 MG: at 08:21

## 2018-08-02 RX ADMIN — OXYCODONE HYDROCHLORIDE AND ACETAMINOPHEN 1 TABLET: 5; 325 TABLET ORAL at 12:37

## 2018-08-02 RX ADMIN — MIRTAZAPINE 30 MG: 15 TABLET, FILM COATED ORAL at 21:04

## 2018-08-02 RX ADMIN — OLANZAPINE 5 MG: 5 TABLET, FILM COATED ORAL at 21:04

## 2018-08-02 RX ADMIN — CARVEDILOL 6.25 MG: 6.25 TABLET, FILM COATED ORAL at 08:20

## 2018-08-02 RX ADMIN — CIPROFLOXACIN 125 MG: KIT at 09:25

## 2018-08-02 RX ADMIN — MORPHINE SULFATE 4 MG: 2 INJECTION, SOLUTION INTRAMUSCULAR; INTRAVENOUS at 00:58

## 2018-08-02 RX ADMIN — CLONIDINE HYDROCHLORIDE 0.1 MG: 0.1 TABLET ORAL at 21:04

## 2018-08-02 RX ADMIN — HYDROXYZINE HYDROCHLORIDE 100 MG: 50 TABLET, FILM COATED ORAL at 08:21

## 2018-08-02 RX ADMIN — CLONAZEPAM 2 MG: 1 TABLET ORAL at 21:04

## 2018-08-02 RX ADMIN — FOLIC ACID 1 MG: 1 TABLET ORAL at 08:20

## 2018-08-02 RX ADMIN — CLONIDINE HYDROCHLORIDE 0.1 MG: 0.1 TABLET ORAL at 08:21

## 2018-08-02 RX ADMIN — MICONAZOLE NITRATE: 20 CREAM TOPICAL at 21:07

## 2018-08-02 RX ADMIN — OLANZAPINE 10 MG: 5 TABLET, FILM COATED ORAL at 08:21

## 2018-08-02 RX ADMIN — OXYCODONE HYDROCHLORIDE AND ACETAMINOPHEN 1 TABLET: 5; 325 TABLET ORAL at 17:40

## 2018-08-02 RX ADMIN — CYCLOBENZAPRINE HYDROCHLORIDE 5 MG: 5 TABLET, FILM COATED ORAL at 21:04

## 2018-08-02 RX ADMIN — PANTOPRAZOLE SODIUM 40 MG: 40 TABLET, DELAYED RELEASE ORAL at 09:25

## 2018-08-02 RX ADMIN — OXYCODONE HYDROCHLORIDE AND ACETAMINOPHEN 1 TABLET: 5; 325 TABLET ORAL at 03:31

## 2018-08-02 RX ADMIN — CARVEDILOL 6.25 MG: 6.25 TABLET, FILM COATED ORAL at 17:41

## 2018-08-02 RX ADMIN — LEVOTHYROXINE SODIUM 50 MCG: 50 TABLET ORAL at 06:25

## 2018-08-02 RX ADMIN — MICONAZOLE NITRATE: 20 CREAM TOPICAL at 08:25

## 2018-08-02 ASSESSMENT — ACTIVITIES OF DAILY LIVING (ADL)
ADLS_ACUITY_SCORE: 13

## 2018-08-02 NOTE — PROGRESS NOTES
[place]  Hospitalist Progress Note  Name: Ana Laura Wharton    MRN: [id number]  YOB: 1970    Age: 47 year old  Date of admission: 7/31/2018  Primary care provider: Katheryn Daigle      Reason for Stay (Diagnosis): Acute pancreatitis         Assessment and Plan:      Summary of Stay:  Ms. Ana Laura Wharton is a 47-year-old female with a complicated psychiatric history including depression, anxiety, bipolar disorder, previous suicide attempts who also has had a history of alcohol dependence and opiate dependence who was admitted to outside hospital a year ago for acute pancreatitis, who comes back to the Emergency Room again today with acute epigastric and mid abdominal pain that started around midnight with associated nausea and vomiting.  Here workup in the Emergency Room shows elevated lipase and minimally elevated LFTs suggestive of pancreatitis.  CT scan of the abdomen shows some inflammation around the descending and transverse portion of the duodenum with surrounding mesenteric inflammation that could suggest inflammatory, infectious process of the duodenum versus pancreatitis.  Clinical findings are consistent with acute alcoholic pancreatitis    Problem List/Plan:  1. Acute pancreatitis: Likely secondary to alcohol.  Overall improved with decreasing lipase.  Okay to advance diet to full liquids and adat to low fat. Continue PPI as I suspect there is a component of gastritis.  Doubt need for gi consultation at this time.  2. Alcohol abuse: Recent relapse due to psychosocial stressors.  Educated and counseled to quit.  Patient is not interested in CD consultation at this time.  No evidence of delirium tremens at this time.  Continue the CIWA protocol.  3. History of chronic UTIs: Chronically on Cipro for prophylaxis. Still c/o dysuria; will rpt urinalysis. If +, consider short term IV ceftriaxone.  4. Hypothyroidism: Continue Synthroid  5. Bipolar disorder: Continue  "Zyprexa and Remeron  6. Hypertension: Exacerbated by pain.  Continue carvedilol for now.  7. Acute anemia without evidence of acute blood loss anemia: Likely dilutional from aggressive IV fluid resuscitation.  OK to sl ivf.      DVT Prophylaxis: Pneumatic Compression Devices  Code Status: Full Code  Discharge Dispo: Home  Estimated Disch Date / # of Days until Disch: Possibly tomorrow if able to loc low fat diet    Addendum: no evidence of etoh w/d. Ok to discontinue CIWA protocol      Interval History (Subjective):      Pain better controlled.  Denies any nausea and tolerating clear liquids.         Physical Exam:      Vital signs:  Temp: 98.3  F (36.8  C) Temp src: Oral BP: 119/78 Pulse: 76 Heart Rate: 78 Resp: 16 SpO2: 94 % O2 Device: None (Room air)     Weight: 54.9 kg (121 lb 1.6 oz)  Estimated body mass index is 21.45 kg/(m^2) as calculated from the following:    Height as of 12/13/17: 1.6 m (5' 3\").    Weight as of this encounter: 54.9 kg (121 lb 1.6 oz).    I/O last 3 completed shifts:  In: 2847 [I.V.:2847]  Out: 1050 [Urine:1050]  Vitals:    07/31/18 1319   Weight: 54.9 kg (121 lb 1.6 oz)       Constitutional: Awake, alert, cooperative, no apparent distress   Respiratory: Nl work of breathing. Clear to auscultation bilaterally, no crackles or wheezing   Cardiovascular: Regular rate and rhythm, normal S1 and S2, and no murmur noted   Abdomen: Normal bowel sounds, soft, non-distended, non-tender   Skin: No rashes, no cyanosis, dry to touch   Neuro: CN 2-12 intact, no localizing weakness   Extremities: No edema, normal range of motion   HEENT Normocephalic, atraumatic, normal nasal turbinates; oropharynx clear   Neck Supple; nl inspection; trachea midline; no thryomegaly   Psychiatric: A+O x3. Normal affect          Medications:      All current medications were reviewed with changes reflected in problem list.         Data:      All new lab and imaging data was reviewed.   Labs:    Recent Labs  Lab " 08/02/18  0756 08/01/18  0656 07/31/18  0733   WBC 7.0 5.9 9.6   HGB 10.9* 10.4* 13.3   HCT 33.1* 31.2* 38.4   MCV 98 96 93   * 129* 210       Recent Labs  Lab 08/02/18  0756 08/01/18  1628 08/01/18  0656 07/31/18  0733     --  143 139   POTASSIUM 3.9 3.7 3.3* 3.8   CHLORIDE 107  --  108 101   CO2 28  --  28 26   ANIONGAP 7  --  7 12   GLC 88  --  96 132*   BUN 4*  --  6* 10   CR 0.72  --  0.70 0.65   GFRESTIMATED 87  --  89 >90   GFRESTBLACK >90  --  >90 >90   ISAURO 8.9  --  7.5* 7.9*   PROTTOTAL  --   --  5.5* 7.3   ALBUMIN  --   --  2.9* 3.9   BILITOTAL  --   --  0.7 0.6   ALKPHOS  --   --  140 175*   AST  --   --  26 58*   ALT  --   --  17 27       Recent Labs  Lab 08/02/18  0756 08/01/18  0656 07/31/18  0733   LIPASE 331 562* 1354*      Imaging:   No results found for this or any previous visit (from the past 24 hour(s)).    Chance Capps [phone]

## 2018-08-02 NOTE — PLAN OF CARE
Nurse requested IV start for patient, attempted x1 and patient requested that the attempt be stopped, discussed with patient and bedside nurse and patient agrees to drink more water and take oral pain medication at this time,

## 2018-08-02 NOTE — PLAN OF CARE
Problem: Patient Care Overview  Goal: Plan of Care/Patient Progress Review  Outcome: No Change  Pt. A&Ox4, up independently, Pt.'s IV infiltrated x2 (attempt by flyer and CRNA), MD paged, Will wait until AM to place IV, Pt. May need PICC line placement. Pt. Continues to c/o pain to abdomen and c/o headache. Pt. Started on Percocet. CIWA score 2, Lung sounds clear, room air sat's at 95-96%. Pt. Denies any needs at this time. Will continue with POC.

## 2018-08-02 NOTE — PLAN OF CARE
Problem: Patient Care Overview  Goal: Plan of Care/Patient Progress Review  Outcome: Improving  VSS after AM BP meds. Percocet x2. Lipase trending down most recent 311. CIWA scores 2. Advanced to low fat diet. Up independently in room. Per patient had 4 loose stools and requested imodium as she has in the past for colitis - MD notified. Imodium now ordered 4 times daily as needed. Plan: possibly discharge tomorrow if tolerating diet.

## 2018-08-03 VITALS
DIASTOLIC BLOOD PRESSURE: 86 MMHG | OXYGEN SATURATION: 96 % | TEMPERATURE: 97.9 F | WEIGHT: 122.2 LBS | HEART RATE: 85 BPM | SYSTOLIC BLOOD PRESSURE: 133 MMHG | RESPIRATION RATE: 17 BRPM | BODY MASS INDEX: 21.65 KG/M2

## 2018-08-03 PROCEDURE — 25000132 ZZH RX MED GY IP 250 OP 250 PS 637: Performed by: PHYSICIAN ASSISTANT

## 2018-08-03 PROCEDURE — 25000132 ZZH RX MED GY IP 250 OP 250 PS 637: Performed by: INTERNAL MEDICINE

## 2018-08-03 PROCEDURE — 25000125 ZZHC RX 250: Performed by: PHYSICIAN ASSISTANT

## 2018-08-03 RX ADMIN — FOLIC ACID 1 MG: 1 TABLET ORAL at 08:53

## 2018-08-03 RX ADMIN — PANTOPRAZOLE SODIUM 40 MG: 40 TABLET, DELAYED RELEASE ORAL at 06:39

## 2018-08-03 RX ADMIN — OXYCODONE HYDROCHLORIDE AND ACETAMINOPHEN 1 TABLET: 5; 325 TABLET ORAL at 06:42

## 2018-08-03 RX ADMIN — CLONIDINE HYDROCHLORIDE 0.1 MG: 0.1 TABLET ORAL at 08:53

## 2018-08-03 RX ADMIN — OLANZAPINE 10 MG: 5 TABLET, FILM COATED ORAL at 08:53

## 2018-08-03 RX ADMIN — HYDROXYZINE HYDROCHLORIDE 100 MG: 50 TABLET, FILM COATED ORAL at 08:53

## 2018-08-03 RX ADMIN — CIPROFLOXACIN 125 MG: KIT at 09:02

## 2018-08-03 RX ADMIN — MULTIPLE VITAMINS W/ MINERALS TAB 1 TABLET: TAB at 08:54

## 2018-08-03 RX ADMIN — MICONAZOLE NITRATE: 20 CREAM TOPICAL at 09:03

## 2018-08-03 RX ADMIN — LEVOTHYROXINE SODIUM 50 MCG: 50 TABLET ORAL at 06:39

## 2018-08-03 RX ADMIN — OXYCODONE HYDROCHLORIDE AND ACETAMINOPHEN 1 TABLET: 5; 325 TABLET ORAL at 02:13

## 2018-08-03 RX ADMIN — CARVEDILOL 6.25 MG: 6.25 TABLET, FILM COATED ORAL at 08:53

## 2018-08-03 RX ADMIN — Medication 100 MG: at 08:53

## 2018-08-03 ASSESSMENT — ACTIVITIES OF DAILY LIVING (ADL)
ADLS_ACUITY_SCORE: 13

## 2018-08-03 NOTE — DISCHARGE SUMMARY
Paged by nursing staff regarding patient being anxious to leave.  By the time I arrived to the floor, the patient had already left AMA without signing any papers.  Discharge AGAINST MEDICAL ADVICE without formal AMA signature.  Please see progress note as outlined yesterday for details on her hospital course.

## 2018-08-03 NOTE — PLAN OF CARE
Problem: Pancreatitis, Acute/Chronic (Adult)  Goal: Signs and Symptoms of Listed Potential Problems Will be Absent, Minimized or Managed (Pancreatitis, Acute/Chronic)  Signs and symptoms of listed potential problems will be absent, minimized or managed by discharge/transition of care (reference Pancreatitis, Acute/Chronic (Adult) CPG).   Outcome: Improving  A/O. Independent in room. Steady gait.. VSS afebrile.  LS clear.   Percocet for HA  pain.    Possible discharge in AM.

## 2018-08-03 NOTE — PLAN OF CARE
Problem: Patient Care Overview  Goal: Plan of Care/Patient Progress Review  Outcome: No Change  A&O x 4, confused at times, independent, low fat diet, LS clear, no abdominal pain or discomfort, does have pain in neck and shoulders 5/10 & 9/10, oxy given twice during shift, urine culture pending, will continue to monitor.

## 2018-08-03 NOTE — PROGRESS NOTES
Pt was getting anxious to leave, she was encouraged by staff to wait patiently for the md to round.  Writer paged md, upon arrival to the floor the patient had already left.  PIV was removed prior to shift.

## 2018-08-26 NOTE — PLAN OF CARE
Problem: Goal Outcome Summary  Goal: Goal Outcome Summary  Outcome: No Change   Pt on 1:1 sitter.Patient states no suicidal ideation this shift. MSSA score of 8, Ativan 1 mg po given, last score was 6. Had several loose stools . D51/2 +20K at 100 cc infusing as ordered.  Tolerating diet.BP remains elevated, prn Hydralazine HCL 25 mg given 2 x this shift. Pt appears more anxious after  visited. Vistaril 50 mg po given with some relief. Enteric isolation maintained. Will continue to monitor.       26-Aug-2018 11:18

## 2018-09-04 DIAGNOSIS — F31.9 BIPOLAR I DISORDER (H): ICD-10-CM

## 2018-09-04 RX ORDER — MIRTAZAPINE 45 MG/1
45 TABLET, FILM COATED ORAL AT BEDTIME
Qty: 30 TABLET | Refills: 1 | Status: SHIPPED | OUTPATIENT
Start: 2018-09-04 | End: 2018-09-21

## 2018-09-07 ENCOUNTER — HOSPITAL ENCOUNTER (EMERGENCY)
Facility: CLINIC | Age: 48
Discharge: HOME | End: 2018-09-07
Attending: EMERGENCY MEDICINE | Admitting: EMERGENCY MEDICINE
Payer: COMMERCIAL

## 2018-09-07 VITALS
HEIGHT: 66 IN | TEMPERATURE: 98.2 F | BODY MASS INDEX: 18.48 KG/M2 | SYSTOLIC BLOOD PRESSURE: 141 MMHG | OXYGEN SATURATION: 98 % | RESPIRATION RATE: 16 BRPM | DIASTOLIC BLOOD PRESSURE: 96 MMHG | WEIGHT: 115 LBS

## 2018-09-07 DIAGNOSIS — K85.20 ALCOHOL-INDUCED ACUTE PANCREATITIS, UNSPECIFIED COMPLICATION STATUS: ICD-10-CM

## 2018-09-07 LAB
ALBUMIN SERPL-MCNC: 3.6 G/DL (ref 3.4–5)
ALP SERPL-CCNC: 152 U/L (ref 40–150)
ALT SERPL W P-5'-P-CCNC: 23 U/L (ref 0–50)
ANION GAP SERPL CALCULATED.3IONS-SCNC: 10 MMOL/L (ref 3–14)
AST SERPL W P-5'-P-CCNC: ABNORMAL U/L (ref 0–45)
BASOPHILS # BLD AUTO: 0.1 10E9/L (ref 0–0.2)
BASOPHILS NFR BLD AUTO: 0.8 %
BILIRUB SERPL-MCNC: 0.5 MG/DL (ref 0.2–1.3)
BUN SERPL-MCNC: 10 MG/DL (ref 7–30)
CALCIUM SERPL-MCNC: 8 MG/DL (ref 8.5–10.1)
CHLORIDE SERPL-SCNC: 105 MMOL/L (ref 94–109)
CO2 SERPL-SCNC: 25 MMOL/L (ref 20–32)
CREAT SERPL-MCNC: 0.61 MG/DL (ref 0.52–1.04)
DIFFERENTIAL METHOD BLD: ABNORMAL
EOSINOPHIL # BLD AUTO: 0.2 10E9/L (ref 0–0.7)
EOSINOPHIL NFR BLD AUTO: 2.2 %
ERYTHROCYTE [DISTWIDTH] IN BLOOD BY AUTOMATED COUNT: 17.8 % (ref 10–15)
GFR SERPL CREATININE-BSD FRML MDRD: ABNORMAL ML/MIN/1.7M2
GLUCOSE SERPL-MCNC: 127 MG/DL (ref 70–99)
HCT VFR BLD AUTO: 32.2 % (ref 35–47)
HGB BLD-MCNC: 11 G/DL (ref 11.7–15.7)
IMM GRANULOCYTES # BLD: 0 10E9/L (ref 0–0.4)
IMM GRANULOCYTES NFR BLD: 0.1 %
LIPASE SERPL-CCNC: 637 U/L (ref 73–393)
LYMPHOCYTES # BLD AUTO: 2 10E9/L (ref 0.8–5.3)
LYMPHOCYTES NFR BLD AUTO: 27.1 %
MCH RBC QN AUTO: 33.8 PG (ref 26.5–33)
MCHC RBC AUTO-ENTMCNC: 34.2 G/DL (ref 31.5–36.5)
MCV RBC AUTO: 99 FL (ref 78–100)
MONOCYTES # BLD AUTO: 0.9 10E9/L (ref 0–1.3)
MONOCYTES NFR BLD AUTO: 12 %
NEUTROPHILS # BLD AUTO: 4.2 10E9/L (ref 1.6–8.3)
NEUTROPHILS NFR BLD AUTO: 57.8 %
NRBC # BLD AUTO: 0 10*3/UL
NRBC BLD AUTO-RTO: 0 /100
PLATELET # BLD AUTO: 203 10E9/L (ref 150–450)
POTASSIUM SERPL-SCNC: 3.6 MMOL/L (ref 3.4–5.3)
PROT SERPL-MCNC: 6.6 G/DL (ref 6.8–8.8)
RBC # BLD AUTO: 3.25 10E12/L (ref 3.8–5.2)
SODIUM SERPL-SCNC: 140 MMOL/L (ref 133–144)
WBC # BLD AUTO: 7.2 10E9/L (ref 4–11)

## 2018-09-07 PROCEDURE — 85025 COMPLETE CBC W/AUTO DIFF WBC: CPT | Performed by: EMERGENCY MEDICINE

## 2018-09-07 PROCEDURE — 25000128 H RX IP 250 OP 636: Performed by: EMERGENCY MEDICINE

## 2018-09-07 PROCEDURE — 99285 EMERGENCY DEPT VISIT HI MDM: CPT | Mod: 25

## 2018-09-07 PROCEDURE — 83690 ASSAY OF LIPASE: CPT | Performed by: EMERGENCY MEDICINE

## 2018-09-07 PROCEDURE — 96375 TX/PRO/DX INJ NEW DRUG ADDON: CPT

## 2018-09-07 PROCEDURE — 96361 HYDRATE IV INFUSION ADD-ON: CPT

## 2018-09-07 PROCEDURE — 80053 COMPREHEN METABOLIC PANEL: CPT | Performed by: EMERGENCY MEDICINE

## 2018-09-07 PROCEDURE — 96376 TX/PRO/DX INJ SAME DRUG ADON: CPT

## 2018-09-07 PROCEDURE — 96374 THER/PROPH/DIAG INJ IV PUSH: CPT

## 2018-09-07 RX ORDER — ONDANSETRON 2 MG/ML
4 INJECTION INTRAMUSCULAR; INTRAVENOUS ONCE
Status: COMPLETED | OUTPATIENT
Start: 2018-09-07 | End: 2018-09-07

## 2018-09-07 RX ORDER — HYDROMORPHONE HYDROCHLORIDE 1 MG/ML
0.5 INJECTION, SOLUTION INTRAMUSCULAR; INTRAVENOUS; SUBCUTANEOUS ONCE
Status: COMPLETED | OUTPATIENT
Start: 2018-09-07 | End: 2018-09-07

## 2018-09-07 RX ORDER — OXYCODONE HYDROCHLORIDE 5 MG/1
5 TABLET ORAL EVERY 6 HOURS PRN
Qty: 12 TABLET | Refills: 0 | Status: SHIPPED | OUTPATIENT
Start: 2018-09-07 | End: 2018-09-17

## 2018-09-07 RX ADMIN — SODIUM CHLORIDE 1000 ML: 9 INJECTION, SOLUTION INTRAVENOUS at 10:54

## 2018-09-07 RX ADMIN — ONDANSETRON HYDROCHLORIDE 4 MG: 2 INJECTION, SOLUTION INTRAMUSCULAR; INTRAVENOUS at 10:54

## 2018-09-07 RX ADMIN — HYDROMORPHONE HYDROCHLORIDE 1 MG: 1 INJECTION, SOLUTION INTRAMUSCULAR; INTRAVENOUS; SUBCUTANEOUS at 13:48

## 2018-09-07 RX ADMIN — HYDROMORPHONE HYDROCHLORIDE 1 MG: 1 INJECTION, SOLUTION INTRAMUSCULAR; INTRAVENOUS; SUBCUTANEOUS at 12:20

## 2018-09-07 RX ADMIN — Medication 0.5 MG: at 10:54

## 2018-09-07 ASSESSMENT — ENCOUNTER SYMPTOMS
COUGH: 1
ABDOMINAL PAIN: 1
FEVER: 0

## 2018-09-07 NOTE — ED PROVIDER NOTES
History     Chief Complaint:  Abdominal pain    The history is provided by the patient.      Ana Laura Wharton is a 47 year old female with a history of alcoholic pancreatitis who presents with epigastric abdominal pain. The patient states that she was recently diagnosed with alcoholic pancreatitis a few weeks ago and since her diagnosis the pain has never completely gone away, but in the last couple days it has flared up. She rates the pain a 10/10 in severity and notes that the pain does not radiate to her lower abdomen. She was able to tolerate a few sips of water this morning and has not eaten any solid food since yesterday. She notes that she has had a productive cough, but denies any fevers. The patient has roughly 2 drinks containing vodka a day. She has had a couple pancreatitis episodes in the past year and one of which required a blood transfusion.    Allergies:  Amoxicillin-Pot Clavulanate  Azithromycin  Cephalexin  Compazine [Prochlorperazine]  Metoclopramide  Minocycline  Penicillins  Reglan [Metoclopramide Hcl]  Restoril [Temazepam]  Thorazine [Chlorpromazine]  Abilify [Aripiprazole]  Lamotrigine  Sulfa Drugs     Medications:    Tylenol  Coreg  Cipro  Klonpin  Catapres  Flexeril  Folvite  Vistaril  Ibuprofen  Synthroid  Remeron  Zyprexa  Protonix     Past Medical History:    Anxiety  Bipolar disorder  C. Difficile colitis  Depressive disorder  HTN  GERD  Hypothyroidism  Migraine  Spontaneous pneumothorax  Suicide attempt  Pancreatitis    Past Surgical History:    Knee arthroscopy  Cervix surgery  Left shoulder surgery  Thoracic surgery    Family History:    Depression  Lipids  Macular degeneration    Social History:  Patient presents alone  Some day smoker  Positive for alcohol use.   Marital Status:  Legally       Review of Systems   Constitutional: Negative for fever.   Respiratory: Positive for cough.    Gastrointestinal: Positive for abdominal pain.   All other systems reviewed and are  "negative.      Physical Exam   First Vitals:  BP: (!) 158/111  Heart Rate: 85  Temp: 98.2  F (36.8  C)  Resp: 16  Height: 167.6 cm (5' 6\")  Weight: 52.2 kg (115 lb)  SpO2: 100 %      Physical Exam  Constitutional: Vital signs reviewed as above (HTN initially, better on recheck). There appears to be mild distress  HENT:                         Head: No external signs of trauma noted.                        Eyes: Conjunctivae are normal. Pupils are equal, round, and reactive to light.   Cardiovascular:                         Normal rate, regular rhythm and normal heart sounds.                          Exam reveals no friction rub.                          No murmur heard.  Pulmonary/Chest:                         Effort normal and breath sounds normal.                         No respiratory distress.                         There are no wheezes.                         There are no rales.   Abdominal:                         Soft.                         Bowel sounds normal.                         There is no distension.                         There is epigastric tenderness.                         No LUQ or RUQ tenderness                        No LLQ, RLQ, or periumbilical tenderness                        There is no rebound or guarding.   Musculoskeletal:                         Normal range of motion.                         Normal Tone  Neurological: Patient is alert and oriented to person, place, and time.   Skin: Skin is warm and dry. Patient is not diaphoretic  Psychiatric: Appropriate for patient condition    Emergency Department Course     Laboratory:  CBC: WBC: 7.2, HGB: 11.0 (L), PLT: 203  CMP: Glucose 127 (H), Calcium: 8.0 (L), Protein total: 6.6 (L), Alkphos: 152 (H), o/w WNL (Creatinine: 0.61) (Ast unsatisfactory specimen)  Lipase: 637 (H)    Interventions:  1054 - NS 1L IV  1054 - Dilaudid 0.5 mg IV  1054 - Zofran 4 mg IV  1220 - Dilaudid 1 mg IV      Emergency Department Course:  Nursing notes and " vitals reviewed.  1031: I performed an exam of the patient as documented above. IV inserted and blood drawn.  Labs ordered    1306: I rechecked the patient. Findings and plan explained to the Patient. Patient discharged home with instructions regarding supportive care, medications, and reasons to return. The importance of close follow-up was reviewed.     Impression & Plan      Medical Decision Making:  Ana Laura Wharton is a 47 year old female who presents with epigastric abdominal pain.  The differential diagnosis would include GERD, GIB, esophageal spasm, atypical cardiac sx's, pancreatitis, biliary colic or gallstone disease, AAA, gastroenteritis, gastritis, large vs small bowel disease, etc.  Based on history, PE and labs, the most likely explanation is pancreatitis.  Abdominal exam is notable only for epigastric pain at this point.   Pain control in ED was somewhat successful.  I discussed admission with the patient but she states that she would prefer to try outpatient treatment for this.  We had a long discussion about advancing her diet slowly and abstaining from alcohol.  I have encouraged close outpatient follow-up and also encouraged the patient to return to the ED should she feel worse.  Anticipatory guidance given prior to discharge.        Diagnosis:    ICD-10-CM    1. Alcohol-induced acute pancreatitis, unspecified complication status K85.20        Disposition:  discharged to home    Discharge Medications:  New Prescriptions    OXYCODONE IR (ROXICODONE) 5 MG TABLET    Take 1 tablet (5 mg) by mouth every 6 hours as needed for pain     Ron CRUZ, am serving as a scribe on 9/7/2018 at 10:31 AM to personally document services performed by Juan Stark DO based on my observations and the provider's statements to me.       Ron Pedro  9/7/2018   [facility] EMERGENCY DEPARTMENT       Juan Stark DO  09/07/18 1536

## 2018-09-07 NOTE — ED AVS SNAPSHOT
[facility] Emergency Department    [address]    [facility] [address]    Phone:  [phone]    Fax:  [phone]                                       Ana Laura Wharton   MRN: [id number]    Department:  [facility] Emergency Department   Date of Visit:  9/7/2018           Patient Information     Date Of Birth          1970        Your diagnoses for this visit were:     Alcohol-induced acute pancreatitis, unspecified complication status        You were seen by Juan Stark DO.      Follow-up Information     Follow up with Katheryn Daigle MD. Schedule an appointment as soon as possible for a visit on 9/10/2018.    Specialty:  Family Practice    Why:  For recheck    Contact information:    [facility]  [address]  [facility] [address]  [phone]          Follow up with [facility] Emergency Department.    Specialty:  EMERGENCY MEDICINE    Why:  If symptoms worsen    Contact information:    [address]  [facility] [address] [phone]        Discharge Instructions         Discharge Instructions for Acute Pancreatitis  You have been diagnosed with acute pancreatitis. Your pancreas is inflamed or swollen. The pancreas is an organ that makes digestive juices and hormones. Gallstones are a common cause of pancreatitis. These hard stones form in the gallbladder. The gallbladder shares a tube with the pancreas into the small intestine. If gallstones block this tube, fluid can t leave the pancreas. The fluid backs up and causes redness and swelling (inflammation). There are other causes of pancreatitis. Make sure you understand the cause of your pancreatitis. Then you can try to stop it from happening again.  Immediate home care    Find someone to drive you to appointments. Acute pancreatitis is a serious condition, and you should never drive if you are experiencing symptoms.    Stop drinking if your illness was  caused by alcohol.  ? Ask your healthcare provider about alcohol abuse programs and support groups such as Alcoholics Anonymous.  ? Ask your provider about prescription medicines that can help you stop drinking.  ? Tell your provider about the alcohol withdrawal symptoms you have when you stop drinking. This is very important. You may need close medical supervision and special medicines when you stop drinking. This will depend on your alcohol withdrawal history.     Take your medicines exactly as directed. Don t skip doses.    Eat a low-fat diet. Ask your provider for menus and other diet information.    Learn to take your own pulse. Keep a record of your results. Ask your provider which readings mean that you need medical attention.  Ongoing care    Tell your provider about any medicines you are taking. Some medicines can cause this condition.    Before starting any new medicine, ask your provider if it will harm your pancreas. This includes any new over-the-counter medicines, vitamins, or herbal supplements.      Tell your provider if you lose weight without dieting.    Be aware of symptoms that may mean your pancreatitis has come back. These symptoms include belly pain, nausea and vomiting, and fever.    Keep all follow-up appointments with your provider. Problems can often show up later.  Follow-up  Follow up with your healthcare provider, or as advised.  When to call your provider  Call your healthcare provider right away if you have any of the following:    Fever of 100.4 F (38.0 C) or higher, or as advised by your provider    Severe pain from your upper belly to your back    Nausea and vomiting    Feely dizzy or lightheaded    Yellowing of your skin or eyes (jaundice)    Bruises on your belly or back    Belly swelling and tenderness    Rapid pulse    Shallow, fast breathing   Date Last Reviewed: 8/1/2016 2000-2017 [employer]. [address] [place] [address], [place] [address]. All rights reserved.  This information is not intended as a substitute for professional medical care. Always follow your healthcare professional's instructions.          24 Hour Appointment Hotline       To make an appointment at any [facility] clinic, call [phone] ([phone]). If you don't have a family doctor or clinic, we will help you find one. [facility] clinics are conveniently located to serve the needs of you and your family.             Review of your medicines      START taking        Dose / Directions Last dose taken    oxyCODONE IR 5 MG tablet   Commonly known as:  ROXICODONE   Dose:  5 mg   Quantity:  12 tablet        Take 1 tablet (5 mg) by mouth every 6 hours as needed for pain   Refills:  0          Our records show that you are taking the medicines listed below. If these are incorrect, please call your family doctor or clinic.        Dose / Directions Last dose taken    acetaminophen 325 MG tablet   Commonly known as:  TYLENOL   Dose:  975 mg   Quantity:  30 tablet        Take 3 tablets (975 mg) by mouth every 8 hours as needed for mild pain or fever   Refills:  0        CIPRO 250 MG tablet   Dose:  125 mg   Generic drug:  ciprofloxacin        Take 125 mg by mouth daily For UTI prevention   Refills:  0        clonazePAM 1 MG tablet   Commonly known as:  klonoPIN   Dose:  2 mg   Quantity:  180 tablet        Take 2 tablets (2 mg) by mouth At Bedtime   Refills:  0        cloNIDine 0.1 MG tablet   Commonly known as:  CATAPRES   Dose:  0.1 mg        Take 0.1 mg by mouth 2 times daily   Refills:  0        COREG 6.25 MG tablet   Dose:  6.25 mg   Generic drug:  carvedilol        Take 6.25 mg by mouth 2 times daily (with meals)   Refills:  0        cyclobenzaprine 5 MG tablet   Commonly known as:  FLEXERIL   Dose:  5 mg        Take 5 mg by mouth At Bedtime   Refills:  0        FIBER PO   Dose:  2 tablet        Take 2 tablets by mouth daily   Refills:  0        folic acid 400 MCG tablet   Commonly known as:  FOLVITE    Dose:  400 mcg        Take 400 mcg by mouth daily   Refills:  0        hydrOXYzine 50 MG capsule   Commonly known as:  VISTARIL   Dose:  100 mg   Quantity:  180 capsule        Take 2 capsules (100 mg) by mouth daily   Refills:  0        IBUPROFEN PO   Dose:  400-600 mg        Take 400-600 mg by mouth every 6 hours as needed for moderate pain   Refills:  0        IMODIUM A-D 2 MG tablet   Dose:  4 mg   Generic drug:  loperamide        Take 4 mg by mouth as needed for diarrhea   Refills:  0        mirtazapine 45 MG tablet   Commonly known as:  REMERON   Dose:  45 mg   Quantity:  30 tablet        Take 1 tablet (45 mg) by mouth At Bedtime   Refills:  1        PROTONIX PO   Dose:  40 mg        Take 40 mg by mouth every morning (before breakfast)   Refills:  0        RELPAX 40 MG tablet   Dose:  40 mg   Generic drug:  eletriptan        Take 40 mg by mouth at onset of headache for migraine   Refills:  0        SYNTHROID PO   Dose:  50 mcg        Take 50 mcg by mouth daily   Refills:  0        WOMENS MULTI VITAMIN & MINERAL PO   Dose:  1 tablet        Take 1 tablet by mouth daily   Refills:  0        ZOFRAN ODT PO   Dose:  4 mg        Take 4 mg by mouth every 8 hours as needed for nausea   Refills:  0        * zyPREXA 5 MG tablet   Dose:  10 mg   Generic drug:  OLANZapine        Take 10 mg by mouth every morning   Refills:  0        * zyPREXA 5 MG tablet   Dose:  5 mg   Generic drug:  OLANZapine        Take 5 mg by mouth At Bedtime   Refills:  0        * Notice:  This list has 2 medication(s) that are the same as other medications prescribed for you. Read the directions carefully, and ask your doctor or other care provider to review them with you.            Information about OPIOIDS     PRESCRIPTION OPIOIDS: WHAT YOU NEED TO KNOW   We gave you an opioid (narcotic) pain medicine. It is important to manage your pain, but opioids are not always the best choice. You should first try all the other options your care team gave  you. Take this medicine for as short a time (and as few doses) as possible.    Some activities can increase your pain, such as bandage changes or therapy sessions. It may help to take your pain medicine 30 to 60 minutes before these activities. Reduce your stress by getting enough sleep, working on hobbies you enjoy and practicing relaxation or meditation. Talk to your care team about ways to manage your pain beyond prescription opioids.    These medicines have risks:    DO NOT drive when on new or higher doses of pain medicine. These medicines can affect your alertness and reaction times, and you could be arrested for driving under the influence (DUI). If you need to use opioids long-term, talk to your care team about driving.    DO NOT operate heavy machinery    DO NOT do any other dangerous activities while taking these medicines.    DO NOT drink any alcohol while taking these medicines.     If the opioid prescribed includes acetaminophen, DO NOT take with any other medicines that contain acetaminophen. Read all labels carefully. Look for the word  acetaminophen  or  Tylenol.  Ask your pharmacist if you have questions or are unsure.    You can get addicted to pain medicines, especially if you have a history of addiction (chemical, alcohol or substance dependence). Talk to your care team about ways to reduce this risk.    All opioids tend to cause constipation. Drink plenty of water and eat foods that have a lot of fiber, such as fruits, vegetables, prune juice, apple juice and high-fiber cereal. Take a laxative (Miralax, milk of magnesia, Colace, Senna) if you don t move your bowels at least every other day. Other side effects include upset stomach, sleepiness, dizziness, throwing up, tolerance (needing more of the medicine to have the same effect), physical dependence and slowed breathing.    Store your pills in a secure place, locked if possible. We will not replace any lost or stolen medicine. If you don t  finish your medicine, please throw away (dispose) as directed by your pharmacist. The [place] [employer] has more information about safe disposal: https://www.pca.state.[place]/living-green/managing-unwanted-medications        Prescriptions were sent or printed at these locations (1 Prescription)                   Other Prescriptions                Printed at Department/Unit printer (1 of 1)         oxyCODONE IR (ROXICODONE) 5 MG tablet                Procedures and tests performed during your visit     CBC with platelets differential    Comprehensive metabolic panel    Lipase    Peripheral IV catheter      Orders Needing Specimen Collection     None      Pending Results     No orders found from 9/5/2018 to 9/8/2018.            Pending Culture Results     No orders found from 9/5/2018 to 9/8/2018.            Pending Results Instructions     If you had any lab results that were not finalized at the time of your Discharge, you can call the ED Lab Result RN at [phone]. You will be contacted by this team for any positive Lab results or changes in treatment. The nurses are available 7 days a week from 10A to 6:30P.  You can leave a message 24 hours per day and they will return your call.        Test Results From Your Hospital Stay        9/7/2018 11:24 AM      Component Results     Component Value Ref Range & Units Status    WBC 7.2 4.0 - 11.0 10e9/L Final    RBC Count 3.25 (L) 3.8 - 5.2 10e12/L Final    Hemoglobin 11.0 (L) 11.7 - 15.7 g/dL Final    Hematocrit 32.2 (L) 35.0 - 47.0 % Final    MCV 99 78 - 100 fl Final    MCH 33.8 (H) 26.5 - 33.0 pg Final    MCHC 34.2 31.5 - 36.5 g/dL Final    RDW 17.8 (H) 10.0 - 15.0 % Final    Platelet Count 203 150 - 450 10e9/L Final    Diff Method Automated Method  Final    % Neutrophils 57.8 % Final    % Lymphocytes 27.1 % Final    % Monocytes 12.0 % Final    % Eosinophils 2.2 % Final    % Basophils 0.8 % Final    % Immature Granulocytes 0.1 % Final    Nucleated RBCs  0 0 /100 Final    Absolute Neutrophil 4.2 1.6 - 8.3 10e9/L Final    Absolute Lymphocytes 2.0 0.8 - 5.3 10e9/L Final    Absolute Monocytes 0.9 0.0 - 1.3 10e9/L Final    Absolute Eosinophils 0.2 0.0 - 0.7 10e9/L Final    Absolute Basophils 0.1 0.0 - 0.2 10e9/L Final    Abs Immature Granulocytes 0.0 0 - 0.4 10e9/L Final    Absolute Nucleated RBC 0.0  Final         9/7/2018 11:52 AM      Component Results     Component Value Ref Range & Units Status    Sodium 140 133 - 144 mmol/L Final    Potassium 3.6 3.4 - 5.3 mmol/L Final    Specimen slightly hemolyzed, potassium may be falsely elevated    Chloride 105 94 - 109 mmol/L Final    Carbon Dioxide 25 20 - 32 mmol/L Final    Anion Gap 10 3 - 14 mmol/L Final    Glucose 127 (H) 70 - 99 mg/dL Final    Urea Nitrogen 10 7 - 30 mg/dL Final    Creatinine 0.61 0.52 - 1.04 mg/dL Final    GFR Estimate Not Calculated >60 mL/min/1.7m2 Final    GFR Estimate If Black Not Calculated >60 mL/min/1.7m2 Final    Calcium 8.0 (L) 8.5 - 10.1 mg/dL Final    Bilirubin Total 0.5 0.2 - 1.3 mg/dL Final    Albumin 3.6 3.4 - 5.0 g/dL Final    Protein Total 6.6 (L) 6.8 - 8.8 g/dL Final    Alkaline Phosphatase 152 (H) 40 - 150 U/L Final    ALT 23 0 - 50 U/L Final    AST  0 - 45 U/L Final    Unsatisfactory specimen - hemolyzed         9/7/2018 11:52 AM      Component Results     Component Value Ref Range & Units Status    Lipase 637 (H) 73 - 393 U/L Final                Clinical Quality Measure: Blood Pressure Screening     Your blood pressure was checked while you were in the emergency department today. The last reading we obtained was  BP: (!) 141/96 . Please read the guidelines below about what these numbers mean and what you should do about them.  If your systolic blood pressure (the top number) is less than 120 and your diastolic blood pressure (the bottom number) is less than 80, then your blood pressure is normal. There is nothing more that you need to do about it.  If your systolic blood pressure  (the top number) is 120-139 or your diastolic blood pressure (the bottom number) is 80-89, your blood pressure may be higher than it should be. You should have your blood pressure rechecked within a year by a primary care provider.  If your systolic blood pressure (the top number) is 140 or greater or your diastolic blood pressure (the bottom number) is 90 or greater, you may have high blood pressure. High blood pressure is treatable, but if left untreated over time it can put you at risk for heart attack, stroke, or kidney failure. You should have your blood pressure rechecked by a primary care provider within the next 4 weeks.  If your provider in the emergency department today gave you specific instructions to follow-up with your doctor or provider even sooner than that, you should follow that instruction and not wait for up to 4 weeks for your follow-up visit.        Thank you for choosing [facility]       Thank you for choosing [facility] for your care. Our goal is always to provide you with excellent care. Hearing back from our patients is one way we can continue to improve our services. Please take a few minutes to complete the written survey that you may receive in the mail after you visit with us. Thank you!        [email or web address] Information     [employer] gives you secure access to your electronic health record. If you see a primary care provider, you can also send messages to your care team and make appointments. If you have questions, please call your primary care clinic.  If you do not have a primary care provider, please call [phone] and they will assist you.        Care EveryWhere ID     This is your Care EveryWhere ID. This could be used by other organizations to access your [facility] medical records  [id number]        Equal Access to Services     OLGA LUTZ : rolando Davies, jaspal valdovinos. [facility]  [phone].    ATENCIÓN: Si habla español, tiene a gold disposición servicios gratuitos de asistencia lingüística. Llame al [phone].    We comply with applicable federal civil rights laws and [place] laws. We do not discriminate on the basis of race, color, national origin, age, disability, sex, sexual orientation, or gender identity.            After Visit Summary       This is your record. Keep this with you and show to your community pharmacist(s) and doctor(s) at your next visit.

## 2018-09-07 NOTE — ED TRIAGE NOTES
Pt has upper abdominal pain since yesterday which is continuous.  She reports it feels like previous pancreatitis.  She drinks 2 vodka per day.

## 2018-09-07 NOTE — ED AVS SNAPSHOT
[facility] Emergency Department    [address]    [facility] [address]    Phone:  [phone]    Fax:  [phone]                                       Ana Laura Wharton   MRN: [id number]    Department:  [facility] Emergency Department   Date of Visit:  9/7/2018           After Visit Summary Signature Page     I have received my discharge instructions, and my questions have been answered. I have discussed any challenges I see with this plan with the nurse or doctor.    ..........................................................................................................................................  Patient/Patient Representative Signature      ..........................................................................................................................................  Patient Representative Print Name and Relationship to Patient    ..................................................               ................................................  Date                                            Time    ..........................................................................................................................................  Reviewed by Signature/Title    ...................................................              ..............................................  Date                                                            Time          22EPIC Rev 08/18

## 2018-09-10 ENCOUNTER — CARE COORDINATION (OUTPATIENT)
Dept: PSYCHIATRY | Facility: CLINIC | Age: 48
End: 2018-09-10

## 2018-09-10 DIAGNOSIS — F31.9 BIPOLAR I DISORDER (H): ICD-10-CM

## 2018-09-10 RX ORDER — CLONAZEPAM 1 MG/1
2 TABLET ORAL AT BEDTIME
Qty: 180 TABLET | Refills: 0
Start: 2018-09-10 | End: 2018-10-10

## 2018-09-10 NOTE — PROGRESS NOTES
"-Writer received incoming phone call from the pt at [phone]    -According to the pt, she wanted to provide an update per the provider's request. She said on 9/4, the provider increased her antidepressant. Writer reviewed pt's chart and it appears her mirtazapine was increased from 30 mg to 45 mg. She wanted to pass along that her mood and energy have been much better. The pt said, \"I was active yesterday and had motivation to get out of bed. Overall, I have a better outlook on life.\" She also included that she's \"stopped crying.\"     -The pt also requested a 90 d/s of clonazepam. Writer agreed to review her chart and possibly get approval from the provider. The pt voiced understanding. She would like the prescription sent to the [facility] pharmacy in [place] and requested an update once this has been called in. Writer agreed to do so.    Last seen: 6/12/18  RTC: \"TBA once has insurance\"  Cancel: none  No-show: none  Next appt: none     Medication requested: clonazePAM (KLONOPIN) 1 MG tablet  Directions: Take 2 tablets (2 mg) by mouth At Bedtime  Qty: 180  Last refilled: 6/12/18, 3/24/18, and 2/7/18 per [place]      Will route to provider for approval of 90 d/s    "

## 2018-09-10 NOTE — PROGRESS NOTES
Message  Received: Today       Jaden Rodriguez MD Pratt, Laura, RN       Caller: Unspecified (Today,  9:51 AM)                     OK to fill       -Writer phoned 90 d/s to pharmacistLobo with [facility] pharmacy in [place]. Med tab changed to reflect this.  -Called pt at the number in the previous message and informed her that the prescription has been called in.

## 2018-09-12 ENCOUNTER — TELEPHONE (OUTPATIENT)
Dept: PSYCHIATRY | Facility: CLINIC | Age: 48
End: 2018-09-12

## 2018-09-12 NOTE — TELEPHONE ENCOUNTER
Last Seen: 6/12/2018  RTC: TBA once has insurance  Cancel: 5   No Show: none  Next Appointment: none    In coming refill from: [facility]    Medication Requested: mirtazapine 45 mg tablet  Directions: need directions sent to the pharmacy.    Medication Requested: hydroxyzine HCL 50 mg tablet  Directions: need directions sent to the pharmacy.    Medication Requested: olanzapine 5 mg tablet  Directions: need directions sent to the pharmacy.    A message was routed to Dr. Rodriguez and Hattie Motta Rn

## 2018-09-13 ENCOUNTER — TELEPHONE (OUTPATIENT)
Dept: PSYCHIATRY | Facility: CLINIC | Age: 48
End: 2018-09-13

## 2018-09-13 NOTE — TELEPHONE ENCOUNTER
On 9/12/2018 # pages were received from [facility] requesting the completion of a Physician New Rx Request form. This writer put the original in Dr. Rodriguez's folder, kept a copy in psychiatry until completed form returned and routed a message to Dr. Rodriguez and Hattie Motta RN. Beronica Sears LPN

## 2018-09-14 NOTE — TELEPHONE ENCOUNTER
On 9/14/2018, a completed Physician New Rx fax form was returned to this writer. This writer faxed the form to [phone], sent the original to scanning and copy was held in psychiatry until scanning complete. Beronica Sears LPN

## 2018-09-17 ENCOUNTER — OFFICE VISIT (OUTPATIENT)
Dept: INTERNAL MEDICINE | Facility: CLINIC | Age: 48
End: 2018-09-17
Payer: COMMERCIAL

## 2018-09-17 VITALS
OXYGEN SATURATION: 99 % | TEMPERATURE: 98.2 F | HEART RATE: 87 BPM | RESPIRATION RATE: 20 BRPM | SYSTOLIC BLOOD PRESSURE: 100 MMHG | BODY MASS INDEX: 19.19 KG/M2 | WEIGHT: 118.9 LBS | DIASTOLIC BLOOD PRESSURE: 72 MMHG

## 2018-09-17 DIAGNOSIS — M25.662 KNEE STIFFNESS, LEFT: Primary | ICD-10-CM

## 2018-09-17 DIAGNOSIS — Z12.31 ENCOUNTER FOR SCREENING MAMMOGRAM FOR BREAST CANCER: ICD-10-CM

## 2018-09-17 PROCEDURE — 99214 OFFICE O/P EST MOD 30 MIN: CPT | Performed by: INTERNAL MEDICINE

## 2018-09-17 NOTE — PROGRESS NOTES
SUBJECTIVE:     Ana Laura Wharton is a 47 year old female  With history of bipolar disorder, depression, GERD, recurrent UTI, recurrent c.diff colitis who presents to establish care      Reports having more pain in L knee, history of meniscal tear.     Follows with psychiatry regarding biploar and depression.     Recent divorce finalized a month ago, has been taking a toll on her.  Smoking now, and drinking too.  She reports stopping the alcohol intake, however continues to smoke. Wants to work on one thing at the time      Problem list and histories reviewed & adjusted, as indicated.  Additional history: as documented    Patient Active Problem List   Diagnosis     Depressed bipolar I disorder in full remission (H)     Anxiety disorder     Suicide ideation     Bipolar disorder current episode depressed (H)     Overdose     Depression     Mood disorder (H)     Suicidal ideations     Bipolar I disorder (H)     Acute renal failure (H)     Alternating exotropia     Anxiety state     Other bipolar disorders     Personality disorder     Tear film insufficiency     Dysfunctions associated with sleep stages or arousal from sleep     Abnormal finding of blood chemistry     Esophageal reflux     Other specified hypothyroidism     Impulse control disorder     Acidosis     Low back pain     Myopia     Opioid dependence (H)     Orofacial dyskinesia     Intractable migraine     Screening for malignant neoplasm of cervix     Pure hypercholesterolemia     Poisoning by 4-aminophenol derivatives, undetermined intent     Essential hypertension     Left knee pain     Aftercare following surgery of the musculoskeletal system     UTI (urinary tract infection)     Acute cystitis     Clostridium difficile colitis     Drug overdose     Calcium channel blocker overdose     Intentional acetaminophen overdose (H)     Alcoholic intoxication with complication (H)     Hypotension due to medication     Acute renal failure, unspecified acute renal  failure type (H)     Paroxysmal atrial fibrillation (H)     Acute respiratory failure with hypoxia (H)     Acute pulmonary edema (H)     Spontaneous pneumothorax     PRES (posterior reversible encephalopathy syndrome)     Pancreatitis     Past Surgical History:   Procedure Laterality Date     ARTHROSCOPY KNEE      L knee     CERVIX SURGERY      for pre-cancerous changes     left knee surgery       ORTHOPEDIC SURGERY      L shoulder     THORACIC SURGERY      for pneumothorax, pleurodesis and lobe resection       Social History   Substance Use Topics     Smoking status: Current Some Day Smoker     Types: Cigarettes     Last attempt to quit: 1/1/1997     Smokeless tobacco: Never Used     Alcohol use 1.2 oz/week     2 Glasses of wine per week      Comment: 2 per day     Family History   Problem Relation Age of Onset     Depression Mother      Lipids Father      hyperlipidemia     Macular Degeneration Father            Reviewed and updated as needed this visit by clinical staff  Tobacco  Allergies  Meds  Med Hx  Surg Hx  Fam Hx  Soc Hx      Reviewed and updated as needed this visit by Provider         ROS:  Constitutional, HEENT, cardiovascular, pulmonary, gi and gu systems are negative, except as otherwise noted.    OBJECTIVE:     /72 (BP Location: Right arm, Patient Position: Sitting, Cuff Size: Adult Regular)  Pulse 87  Temp 98.2  F (36.8  C) (Oral)  Resp 20  Wt 118 lb 14.4 oz (53.9 kg)  SpO2 99%  BMI 19.19 kg/m2  Body mass index is 19.19 kg/(m^2).  GENERAL: healthy, alert and no distress  NECK: no adenopathy, no asymmetry, masses, or scars and thyroid normal to palpation  RESP: lungs clear to auscultation - no rales, rhonchi or wheezes  CV: regular rate and rhythm, normal S1 S2, no S3 or S4, no murmur, click or rub  ABDOMEN: soft, nontender, no hepatosplenomegaly, no masses and bowel sounds normal  MS: no gross musculoskeletal defects noted, no edema  Knee with bilateral crepitus    Diagnostic Test  Results:  none     ASSESSMENT/PLAN:       1. Knee stiffness, left    Patient reports worsening knee pain and stiffness. She has a history of meniscal injury and has developed early OA in the knee.  Nonsurgical referral placed, consider of injections       - ORTHO  REFERRAL    Recommended she follow up with Amanda in our office, given her recent divorce and other therapist not in network    Health Maintenance  Will work on tobacco cessation at next visit  PAP done 04/2015, due 04/2020  Mammo done 8/2016, repeat ordered    Terri Velazco MD  [facility]

## 2018-09-17 NOTE — MR AVS SNAPSHOT
After Visit Summary   9/17/2018    Ana Laura Wharton    MRN: [id number]           Patient Information     Date Of Birth          1970        Visit Information        Provider Department      9/17/2018 1:00 PM Terri Velazco MD [facility]        Today's Diagnoses     Knee stiffness, left    -  1    Encounter for screening mammogram for breast cancer           Follow-ups after your visit        Additional Services     ORTHO  REFERRAL       [facility] is referring you to the Orthopedic  Services at [facility].       The  Representative will assist you in the coordination of your Orthopedic and Musculoskeletal Care as prescribed by your physician.    The  Representative will call you within 1 business day to help schedule your appointment, or you may contact the  Representative at:    All areas ~ [phone]     Type of Referral : Non Surgical  Pain Interventionist       Timeframe requested: 3 - 5 days    Coverage of these services is subject to the terms and limitations of your health insurance plan.  Please call member services at your health plan with any benefit or coverage questions.      If X-rays, CT or MRI's have been performed, please contact the facility where they were done to arrange for , prior to your scheduled appointment.  Please bring this referral request to your appointment and present it to your specialist.                  Your next 10 appointments already scheduled     Sep 24, 2018  1:00 PM CDT   New Visit with CAROL Guzman   [facility] ([facility]    [address] [facility] [address]  [facility] [address]   [phone]              Future tests that were ordered for you today     Open Future Orders        Priority Expected Expires Ordered    MA Screening Digital Bilateral Routine  4/17/2019 9/17/2018            Who to contact      If you have questions or need follow up information about today's clinic visit or your schedule please contact [facility] directly at [phone].  Normal or non-critical lab and imaging results will be communicated to you by MyChart, letter or phone within 4 business days after the clinic has received the results. If you do not hear from us within 7 days, please contact the clinic through [employer] or phone. If you have a critical or abnormal lab result, we will notify you by phone as soon as possible.  Submit refill requests through [employer] or call your pharmacy and they will forward the refill request to us. Please allow 3 business days for your refill to be completed.          Additional Information About Your Visit        [employer] Information     [employer] gives you secure access to your electronic health record. If you see a primary care provider, you can also send messages to your care team and make appointments. If you have questions, please call your primary care clinic.  If you do not have a primary care provider, please call [phone] and they will assist you.        Care EveryWhere ID     This is your Care EveryWhere ID. This could be used by other organizations to access your [place] medical records  [id number]        Your Vitals Were     Pulse Temperature Respirations Pulse Oximetry BMI (Body Mass Index)       87 98.2  F (36.8  C) (Oral) 20 99% 19.19 kg/m2        Blood Pressure from Last 3 Encounters:   09/17/18 100/72   09/07/18 (!) 141/96   08/03/18 133/86    Weight from Last 3 Encounters:   09/17/18 118 lb 14.4 oz (53.9 kg)   09/07/18 115 lb (52.2 kg)   08/03/18 122 lb 3.2 oz (55.4 kg)              We Performed the Following     ORTHO  REFERRAL        Primary Care Provider Office Phone # Fax #    Katheryn Daigle [phone]       [facility] [address]  [place] [address]        Equal Access to Services     OLGA LUTZ : Hadii  valente Staley, wayogida luqadaha, qaybta kaalmada talon, waxsusana idiin haysarahcastro corralcatrinaclark oscar sandrine. So [facility] [phone].    ATENCIÓN: Si habla sissyañol, tiene a gold disposición servicios gratuitos de asistencia lingüística. Goraname al [phone].    We comply with applicable federal civil rights laws and [place] laws. We do not discriminate on the basis of race, color, national origin, age, disability, sex, sexual orientation, or gender identity.            Thank you!     Thank you for choosing [facility]  for your care. Our goal is always to provide you with excellent care. Hearing back from our patients is one way we can continue to improve our services. Please take a few minutes to complete the written survey that you may receive in the mail after your visit with us. Thank you!             Your Updated Medication List - Protect others around you: Learn how to safely use, store and throw away your medicines at www.disposemymeds.org.          This list is accurate as of 9/17/18  2:56 PM.  Always use your most recent med list.                   Brand Name Dispense Instructions for use Diagnosis    acetaminophen 325 MG tablet    TYLENOL    30 tablet    Take 3 tablets (975 mg) by mouth every 8 hours as needed for mild pain or fever    Traumatic cerebral edema without loss of consciousness without open intracranial wound, initial encounter (H)       CIPRO 250 MG tablet   Generic drug:  ciprofloxacin      Take 125 mg by mouth daily For UTI prevention        clonazePAM 1 MG tablet    klonoPIN    180 tablet    Take 2 tablets (2 mg) by mouth At Bedtime    Bipolar I disorder (H)       cloNIDine 0.1 MG tablet    CATAPRES     Take 0.1 mg by mouth 2 times daily        COREG 6.25 MG tablet   Generic drug:  carvedilol      Take 6.25 mg by mouth 2 times daily (with meals)        cyclobenzaprine 5 MG tablet    FLEXERIL     Take 5 mg by mouth At Bedtime        FIBER PO      Take 2 tablets by mouth  daily        folic acid 400 MCG tablet    FOLVITE     Take 400 mcg by mouth daily        hydrOXYzine 50 MG capsule    VISTARIL    180 capsule    Take 2 capsules (100 mg) by mouth daily    Bipolar I disorder (H)       IBUPROFEN PO      Take 400-600 mg by mouth every 6 hours as needed for moderate pain        IMODIUM A-D 2 MG tablet   Generic drug:  loperamide      Take 4 mg by mouth as needed for diarrhea        mirtazapine 45 MG tablet    REMERON    30 tablet    Take 1 tablet (45 mg) by mouth At Bedtime    Bipolar I disorder (H)       NEW MED           PROTONIX PO      Take 40 mg by mouth every morning (before breakfast)        RELPAX 40 MG tablet   Generic drug:  eletriptan      Take 40 mg by mouth at onset of headache for migraine        SYNTHROID PO      Take 50 mcg by mouth daily        UNABLE TO FIND      MEDICATION NAME: NEXAPLON IMPLANT for birth control one year 2017        WOMENS MULTI VITAMIN & MINERAL PO      Take 1 tablet by mouth daily        ZOFRAN ODT PO      Take 4 mg by mouth every 8 hours as needed for nausea        zyPREXA 5 MG tablet   Generic drug:  OLANZapine      Take 10 mg by mouth every morning 1 tablet in the morning and one tab at bed time

## 2018-09-21 DIAGNOSIS — F31.9 BIPOLAR I DISORDER (H): ICD-10-CM

## 2018-09-21 RX ORDER — MIRTAZAPINE 45 MG/1
45 TABLET, FILM COATED ORAL AT BEDTIME
Qty: 30 TABLET | Refills: 1 | Status: SHIPPED | OUTPATIENT
Start: 2018-09-21 | End: 2018-10-08

## 2018-09-21 RX ORDER — OLANZAPINE 5 MG/1
TABLET ORAL
Qty: 90 TABLET | Refills: 1 | Status: SHIPPED | OUTPATIENT
Start: 2018-09-21 | End: 2018-09-25

## 2018-09-21 RX ORDER — HYDROXYZINE PAMOATE 50 MG/1
100 CAPSULE ORAL DAILY
Qty: 60 CAPSULE | Refills: 1 | Status: SHIPPED | OUTPATIENT
Start: 2018-09-21 | End: 2018-10-08

## 2018-09-21 NOTE — TELEPHONE ENCOUNTER
Care Coordination  Received: Today       Mega Wilkerson Laura, RN       Phone Number: [phone] (Call me)                     Pt call 9/21/18@11:45am regarding scheduling. Pt asked if either the nurse or provider Michael could please give her a call. Pt will also need refills call in to new pharmacy.     Medication: Olanzapine, Hydroxyzine, Mirtazapine   Pharmacy and location: [facility]   Have you contacted the pharmacy: No   How much of medication do you have left: None   Pending appt: None Pending   Okay to leave VM: Yes     Thanks       -Writer received incoming fax from [facility] stating they received refills, but without directions. Appears the provider did not make any changes and signed off on 30 d/s with one additional refill.     -Writer sent refills of these prescriptions per original approval by the provider.     Last seen: 6/12/18  RTC: TBA once has insurance  Cancel: none  No-show: none  Next appt: none    -Writer refilled medications for 30 d/s with one additional refill as this is what the provider originally approved. Informed pt of this.     -The pt is also requesting to be scheduled outside of the provider's earliest opening. Writer agreed to reach out to the provider for his availability.

## 2018-09-21 NOTE — TELEPHONE ENCOUNTER
Message  Received: Today       Jaden Rodriguez MD Pratt, Laura, RN       Caller: Unspecified (Today, 12:01 PM)                     I can see her 10/2 or 10/3 at 8:00 AM Otherwise, 10/8 at 12:30 pm       -Called pt and informed her that the medication refill/pharmacy issue has been resolved  -Pt is requesting to schedule an appt for 10/8 at 12:30 pm. Message sent to scheduling

## 2018-09-25 ENCOUNTER — TELEPHONE (OUTPATIENT)
Dept: PSYCHIATRY | Facility: CLINIC | Age: 48
End: 2018-09-25

## 2018-09-25 ENCOUNTER — CARE COORDINATION (OUTPATIENT)
Dept: PSYCHIATRY | Facility: CLINIC | Age: 48
End: 2018-09-25

## 2018-09-25 DIAGNOSIS — F31.9 BIPOLAR I DISORDER (H): ICD-10-CM

## 2018-09-25 RX ORDER — CLONAZEPAM 1 MG/1
2 TABLET ORAL AT BEDTIME
Qty: 90 TABLET
Start: 2018-09-25 | End: 2019-01-09

## 2018-09-25 RX ORDER — OLANZAPINE 5 MG/1
TABLET ORAL
Qty: 270 TABLET | Refills: 0 | Status: SHIPPED | OUTPATIENT
Start: 2018-09-25 | End: 2018-12-04

## 2018-09-25 NOTE — TELEPHONE ENCOUNTER
Prior Authorization Retail Medication Request    Medication/Dose: OLANZapine (ZYPREXA) 5 MG tablet  ICD code (if different than what is on RX):  F31.0 (Bipolar I disorder)  Previously Tried and Failed:  Bupropion, diazepam, gabapentin, escitalopram, haldol, hydroxyzine, lorazepam, lurasidone, mirtazapine, and trazodone  Rationale:  The pt has a lengthy mental health history with multiple hospitalizations. She has tried many medications with little effect. The pt was recently started on olanzapine while being hospitalized last year. This was for the same current dose and frequency that is being requested now. She reported the medication was effective in treating her racing thoughts, paranoia, and SI. The provider resumed this medication once she was discharged and she has remained on it since August of 2017, and says it is effective in treating the above symptoms. Although, the pt has experienced recent stressors/life changing events. It's absolutely necessary that the medication is approved to avoid decompensation and hospitalization of this pt.     Insurance Name:  [employer]  Insurance ID:  [id number]      Pharmacy Information (if different than what is on RX)  Name:    Phone:

## 2018-09-25 NOTE — PROGRESS NOTES
-Writer received incoming phone call from pt stating a PA is needed for her olanzapine. She is also requesting a 90 d/s rather than the current 30 d/s as well. Writer confirmed insurance information and will start the PA process ASAP. The pt also stated her clonazepam needs to be on file with [employer]. Writer agreed to call in this order as well.     -The pt reports she lost her house, had a recent divorce, lost her dad, and lost her son. Due to all of the stress, she reports her anxiety has worsened and is wondering if she can increase her Klonopin back to 3 mg. Writer agreed to relay this to the provider. She is willing to wait until he returns to discuss this further and mentions discussing this at her upcoming appt on 10/8.    -Writer confirmed with pharmacy tech that PA is needed (please see following encounter for PA). Writer asked to be transferred to pharmacist to order clonazepam 1 mg tab # 90. Pt last picked up on 9/10. Will need to place med on hold until 12/3. Prescription phoned to pharmacist with [employer] at [phone]. Med tab changed to reflect this. The pharmacist will change the start date with writer's approval to 11/19, as the medication needs time to be shipped and delivered to the pt. Writer will adjust this order PRN after the pt sees the provider on 10/8.

## 2018-09-25 NOTE — TELEPHONE ENCOUNTER
Central Prior Authorization Team   Phone: [phone]    PA Initiation    Medication: OLANZapine (ZYPREXA) 5 MG tablet  Insurance Company: [employer] - Phone [phone] Fax [phone]  Pharmacy Filling the Rx: [facility] [address] [place] [address]  Filling Pharmacy Phone: [phone]  Filling Pharmacy Fax:    Start Date: 9/25/2018

## 2018-09-28 NOTE — TELEPHONE ENCOUNTER
Prior Authorization Approval    Authorization Effective Date: 9/25/2018  Authorization Expiration Date: 9/25/2019  Medication: OLANZapine (ZYPREXA) 5 MG tablet - approved  Approved Dose/Quantity:   Reference #:     Insurance Company: [employer] - Phone [phone] Fax [phone]  Expected CoPay: n/a     CoPay Card Available:      Foundation Assistance Needed:    Which Pharmacy is filling the prescription (Not needed for infusion/clinic administered): [facility] - [facility] [address] [facility]  Pharmacy Notified: Yes  Patient Notified: Yes

## 2018-10-01 ENCOUNTER — TELEPHONE (OUTPATIENT)
Dept: INTERNAL MEDICINE | Facility: CLINIC | Age: 48
End: 2018-10-01

## 2018-10-01 DIAGNOSIS — N39.0 RECURRENT UTI: Primary | ICD-10-CM

## 2018-10-01 RX ORDER — CIPROFLOXACIN 250 MG/1
125 TABLET, FILM COATED ORAL DAILY
Qty: 45 TABLET | Refills: 3 | Status: SHIPPED | OUTPATIENT
Start: 2018-10-01 | End: 2018-10-29

## 2018-10-01 NOTE — TELEPHONE ENCOUNTER
Patient calling to request refill.  She states she uses 1/2 pill of cipro 250mg each day to prevent a UTI.

## 2018-10-08 ENCOUNTER — OFFICE VISIT (OUTPATIENT)
Dept: PSYCHIATRY | Facility: CLINIC | Age: 48
End: 2018-10-08
Attending: PSYCHIATRY & NEUROLOGY
Payer: COMMERCIAL

## 2018-10-08 VITALS
WEIGHT: 116 LBS | DIASTOLIC BLOOD PRESSURE: 80 MMHG | HEART RATE: 96 BPM | SYSTOLIC BLOOD PRESSURE: 113 MMHG | BODY MASS INDEX: 18.72 KG/M2

## 2018-10-08 DIAGNOSIS — F31.9 BIPOLAR I DISORDER (H): ICD-10-CM

## 2018-10-08 PROCEDURE — G0463 HOSPITAL OUTPT CLINIC VISIT: HCPCS | Mod: ZF

## 2018-10-08 RX ORDER — HYDROXYZINE PAMOATE 50 MG/1
100 CAPSULE ORAL DAILY
Qty: 120 CAPSULE | Refills: 1 | Status: SHIPPED | OUTPATIENT
Start: 2018-10-08 | End: 2019-01-09

## 2018-10-08 RX ORDER — MIRTAZAPINE 45 MG/1
45 TABLET, FILM COATED ORAL AT BEDTIME
Qty: 90 TABLET | Refills: 1 | Status: SHIPPED | OUTPATIENT
Start: 2018-10-08 | End: 2019-01-09

## 2018-10-08 ASSESSMENT — PAIN SCALES - GENERAL: PAINLEVEL: NO PAIN (0)

## 2018-10-08 NOTE — MR AVS SNAPSHOT
After Visit Summary   10/8/2018    Ana Laura Wharton    MRN: [id number]           Patient Information     Date Of Birth          1970        Visit Information        Provider Department      10/8/2018 12:30 PM Jaden Rodriguez MD [facility]        Today's Diagnoses     Bipolar I disorder (H)           Follow-ups after your visit        Your next 10 appointments already scheduled     Jan 09, 2019 10:20 AM CST   PHYSICAL with Liborio Lincoln MD   [facility]    [address]  [facility] [address]   [phone]            Marito 10, 2019  3:30 PM CST   Adult Med Follow UP with Jaden Rodriguez MD   Psychiatry Clinic ([facility])    [place] [id number] [address] [phone]              Who to contact     Please call your clinic at [phone] to:    Ask questions about your health    Make or cancel appointments    Discuss your medicines    Learn about your test results    Speak to your doctor            Additional Information About Your Visit        [employer] Information     [employer] gives you secure access to your electronic health record. If you see a primary care provider, you can also send messages to your care team and make appointments. If you have questions, please call your primary care clinic.  If you do not have a primary care provider, please call [phone] and they will assist you.      [employer] is an electronic gateway that provides easy, online access to your medical records. With [employer], you can request a clinic appointment, read your test results, renew a prescription or communicate with your care team.     To access your existing account, please contact your [facility] or call [phone] for assistance.        Care EveryWhere ID     This is your Care EveryWhere ID. This could be used by other organizations to  access your [place] medical records  [id number]        Your Vitals Were     Pulse BMI (Body Mass Index)                96 18.72 kg/m2           Blood Pressure from Last 3 Encounters:   10/10/18 126/78   10/08/18 113/80   09/17/18 100/72    Weight from Last 3 Encounters:   10/10/18 51.7 kg (114 lb)   10/08/18 52.6 kg (116 lb)   09/17/18 53.9 kg (118 lb 14.4 oz)              Today, you had the following     No orders found for display         Where to get your medicines      These medications were sent to [facility] - [place], [place] - [address]  [address] [address]     Phone:  [phone]     hydrOXYzine 50 MG capsule    mirtazapine 45 MG tablet          Primary Care Provider    None Specified       [address] [facility] [address]        Equal Access to Services     [facility]: Hadii valente ku hadasho Soomaali, waaxda luqadaha, qaybta kaalmada adeegyada, jaspal oscar . So [facility] [phone].    ATENCIÓN: Si habla español, tiene a gold disposición servicios gratuitos de asistencia lingüística. Llame al [phone].    We comply with applicable federal civil rights laws and [place] laws. We do not discriminate on the basis of race, color, national origin, age, disability, sex, sexual orientation, or gender identity.            Thank you!     Thank you for choosing [facility]  for your care. Our goal is always to provide you with excellent care. Hearing back from our patients is one way we can continue to improve our services. Please take a few minutes to complete the written survey that you may receive in the mail after your visit with us. Thank you!             Your Updated Medication List - Protect others around you: Learn how to safely use, store and throw away your medicines at www.disposemymeds.org.          This list is accurate as of 10/8/18 11:59 PM.  Always use your most recent med list.                   Brand Name  Dispense Instructions for use Diagnosis    acetaminophen 325 MG tablet    TYLENOL    30 tablet    Take 3 tablets (975 mg) by mouth every 8 hours as needed for mild pain or fever    Traumatic cerebral edema without loss of consciousness without open intracranial wound, initial encounter (H)       clonazePAM 1 MG tablet    klonoPIN    90 tablet    Take 2 tablets (2 mg) by mouth At Bedtime    Bipolar I disorder (H)       cloNIDine 0.1 MG tablet    CATAPRES     Take 0.1 mg by mouth 2 times daily        COREG 6.25 MG tablet   Generic drug:  carvedilol      Take 6.25 mg by mouth 2 times daily (with meals)        cyclobenzaprine 5 MG tablet    FLEXERIL     Take 5 mg by mouth At Bedtime        FIBER PO      Take 2 tablets by mouth daily        folic acid 400 MCG tablet    FOLVITE     Take 400 mcg by mouth daily        hydrOXYzine 50 MG capsule    VISTARIL    120 capsule    Take 2 capsules (100 mg) by mouth daily    Bipolar I disorder (H)       IBUPROFEN PO      Take 400-600 mg by mouth every 6 hours as needed for moderate pain        IMODIUM A-D 2 MG tablet   Generic drug:  loperamide      Take 4 mg by mouth as needed for diarrhea        mirtazapine 45 MG tablet    REMERON    90 tablet    Take 1 tablet (45 mg) by mouth At Bedtime    Bipolar I disorder (H)       NEW MED           OLANZapine 5 MG tablet    zyPREXA    270 tablet    Take two tabs (10 mg) QAM and one tab (5 mg) at bedtime    Bipolar I disorder (H)       PROTONIX PO      Take 40 mg by mouth every morning (before breakfast)        RELPAX 40 MG tablet   Generic drug:  eletriptan      Take 40 mg by mouth at onset of headache for migraine        SYNTHROID PO      Take 50 mcg by mouth daily        UNABLE TO FIND      MEDICATION NAME: NEXAPLON IMPLANT for birth control one year 2017        WOMENS MULTI VITAMIN & MINERAL PO      Take 1 tablet by mouth daily        ZOFRAN ODT PO      Take 4 mg by mouth every 8 hours as needed for nausea

## 2018-10-10 ENCOUNTER — RADIANT APPOINTMENT (OUTPATIENT)
Dept: GENERAL RADIOLOGY | Facility: CLINIC | Age: 48
End: 2018-10-10
Attending: INTERNAL MEDICINE
Payer: COMMERCIAL

## 2018-10-10 ENCOUNTER — OFFICE VISIT (OUTPATIENT)
Dept: INTERNAL MEDICINE | Facility: CLINIC | Age: 48
End: 2018-10-10
Payer: COMMERCIAL

## 2018-10-10 VITALS
HEART RATE: 120 BPM | BODY MASS INDEX: 18.32 KG/M2 | HEIGHT: 66 IN | SYSTOLIC BLOOD PRESSURE: 126 MMHG | DIASTOLIC BLOOD PRESSURE: 78 MMHG | OXYGEN SATURATION: 95 % | WEIGHT: 114 LBS | TEMPERATURE: 98.2 F

## 2018-10-10 DIAGNOSIS — R05.9 COUGH: ICD-10-CM

## 2018-10-10 DIAGNOSIS — K85.20 ALCOHOL-INDUCED ACUTE PANCREATITIS, UNSPECIFIED COMPLICATION STATUS: ICD-10-CM

## 2018-10-10 DIAGNOSIS — Z09 HOSPITAL DISCHARGE FOLLOW-UP: ICD-10-CM

## 2018-10-10 DIAGNOSIS — R05.9 COUGH: Primary | ICD-10-CM

## 2018-10-10 LAB
ALBUMIN UR-MCNC: NEGATIVE MG/DL
APPEARANCE UR: CLEAR
BILIRUB UR QL STRIP: NEGATIVE
COLOR UR AUTO: YELLOW
ERYTHROCYTE [DISTWIDTH] IN BLOOD BY AUTOMATED COUNT: 14.5 % (ref 10–15)
GLUCOSE UR STRIP-MCNC: NEGATIVE MG/DL
HBA1C MFR BLD: 5 % (ref 0–5.6)
HCT VFR BLD AUTO: 44.1 % (ref 35–47)
HGB BLD-MCNC: 14.7 G/DL (ref 11.7–15.7)
HGB UR QL STRIP: NEGATIVE
KETONES UR STRIP-MCNC: NEGATIVE MG/DL
LEUKOCYTE ESTERASE UR QL STRIP: NEGATIVE
LIPASE SERPL-CCNC: 187 U/L (ref 73–393)
MCH RBC QN AUTO: 33.6 PG (ref 26.5–33)
MCHC RBC AUTO-ENTMCNC: 33.3 G/DL (ref 31.5–36.5)
MCV RBC AUTO: 101 FL (ref 78–100)
NITRATE UR QL: NEGATIVE
PH UR STRIP: 6.5 PH (ref 5–7)
PLATELET # BLD AUTO: 384 10E9/L (ref 150–450)
RBC # BLD AUTO: 4.37 10E12/L (ref 3.8–5.2)
SOURCE: NORMAL
SP GR UR STRIP: 1.01 (ref 1–1.03)
UROBILINOGEN UR STRIP-ACNC: 0.2 EU/DL (ref 0.2–1)
WBC # BLD AUTO: 10.8 10E9/L (ref 4–11)

## 2018-10-10 PROCEDURE — 71046 X-RAY EXAM CHEST 2 VIEWS: CPT

## 2018-10-10 PROCEDURE — 85027 COMPLETE CBC AUTOMATED: CPT | Performed by: INTERNAL MEDICINE

## 2018-10-10 PROCEDURE — 80053 COMPREHEN METABOLIC PANEL: CPT | Performed by: INTERNAL MEDICINE

## 2018-10-10 PROCEDURE — 82150 ASSAY OF AMYLASE: CPT | Performed by: INTERNAL MEDICINE

## 2018-10-10 PROCEDURE — 83036 HEMOGLOBIN GLYCOSYLATED A1C: CPT | Performed by: INTERNAL MEDICINE

## 2018-10-10 PROCEDURE — 36415 COLL VENOUS BLD VENIPUNCTURE: CPT | Performed by: INTERNAL MEDICINE

## 2018-10-10 PROCEDURE — 99213 OFFICE O/P EST LOW 20 MIN: CPT | Performed by: INTERNAL MEDICINE

## 2018-10-10 PROCEDURE — 83690 ASSAY OF LIPASE: CPT | Performed by: INTERNAL MEDICINE

## 2018-10-10 PROCEDURE — 81003 URINALYSIS AUTO W/O SCOPE: CPT | Performed by: INTERNAL MEDICINE

## 2018-10-10 RX ORDER — ALBUTEROL SULFATE 90 UG/1
2 AEROSOL, METERED RESPIRATORY (INHALATION) EVERY 6 HOURS PRN
Qty: 1 INHALER | Refills: 0 | Status: ON HOLD | OUTPATIENT
Start: 2018-10-10 | End: 2022-01-01

## 2018-10-10 RX ORDER — CODEINE PHOSPHATE AND GUAIFENESIN 10; 100 MG/5ML; MG/5ML
1 SOLUTION ORAL EVERY 4 HOURS PRN
Qty: 120 ML | Refills: 0 | Status: SHIPPED | OUTPATIENT
Start: 2018-10-10 | End: 2019-01-09

## 2018-10-10 NOTE — PROGRESS NOTES
SUBJECTIVE:   Ana Laura Wharton is a 47 year old female who presents to clinic today for the following health issues:      ED/UC Followup:    Facility:  [facility]  Date of visit: 09/07/18  Reason for visit: Alcohol induced acute pancreatitis  Current Status: improved      Patient is seen for a follow up visit.  Has h/o pancreatitis. Alcohol induced. Seen in ED a month ago. Had elevated pancreas enzymes, abdominal pain. CT showed changes of the duodenum and colon and possible pancreatitis. Also colitis changes. Still has abdominal pain, diffuse, nausea, worse post eating. Has frequent loose stools, oily appearing. No fever, no vomiting, able to eat.   Has had cough for 10 days, congested, wheezy. Smokes cigarettes. No chest pain, mild SOB.   Has h/o bipolar disorder , follows with psychiatry on treatment.   Lab work done recently showed elevated glucose. Reports frequent urination,no burning.   Reports history of lung collapses multiple times. No chest pain currently.       Problem list and histories reviewed & adjusted, as indicated.  Additional history: as documented    Patient Active Problem List   Diagnosis     Depressed bipolar I disorder in full remission (H)     Anxiety disorder     Suicide ideation     Bipolar disorder current episode depressed (H)     Overdose     Depression     Mood disorder (H)     Suicidal ideations     Bipolar I disorder (H)     Acute renal failure (H)     Alternating exotropia     Anxiety state     Other bipolar disorders     Personality disorder (H)     Tear film insufficiency     Dysfunctions associated with sleep stages or arousal from sleep     Abnormal finding of blood chemistry     Esophageal reflux     Other specified hypothyroidism     Impulse control disorder     Acidosis     Low back pain     Myopia     Opioid dependence (H)     Orofacial dyskinesia     Intractable migraine     Screening for malignant neoplasm of cervix     Pure hypercholesterolemia     Poisoning by 4-aminophenol  derivatives, undetermined intent     Essential hypertension     Left knee pain     Aftercare following surgery of the musculoskeletal system     UTI (urinary tract infection)     Acute cystitis     Clostridium difficile colitis     Drug overdose     Calcium channel blocker overdose     Intentional acetaminophen overdose (H)     Alcoholic intoxication with complication (H)     Hypotension due to medication     Acute renal failure, unspecified acute renal failure type (H)     Paroxysmal atrial fibrillation (H)     Acute respiratory failure with hypoxia (H)     Acute pulmonary edema (H)     Spontaneous pneumothorax     PRES (posterior reversible encephalopathy syndrome)     Pancreatitis     Past Surgical History:   Procedure Laterality Date     ARTHROSCOPY KNEE      L knee     CERVIX SURGERY      for pre-cancerous changes     left knee surgery       ORTHOPEDIC SURGERY      L shoulder     THORACIC SURGERY      for pneumothorax, pleurodesis and lobe resection       Social History   Substance Use Topics     Smoking status: Current Some Day Smoker     Types: Cigarettes     Last attempt to quit: 1/1/1997     Smokeless tobacco: Never Used     Alcohol use No      Comment: Quit on 09/07/18     Family History   Problem Relation Age of Onset     Depression Mother      Lipids Father      hyperlipidemia     Macular Degeneration Father          Current Outpatient Prescriptions   Medication Sig Dispense Refill     acetaminophen (TYLENOL) 325 MG tablet Take 3 tablets (975 mg) by mouth every 8 hours as needed for mild pain or fever 30 tablet 0     albuterol (PROAIR HFA/PROVENTIL HFA/VENTOLIN HFA) 108 (90 Base) MCG/ACT inhaler Inhale 2 puffs into the lungs every 6 hours as needed for shortness of breath / dyspnea or wheezing 1 Inhaler 0     carvedilol (COREG) 6.25 MG tablet Take 6.25 mg by mouth 2 times daily (with meals)       ciprofloxacin (CIPRO) 250 MG tablet Take 0.5 tablets (125 mg) by mouth daily For UTI prevention 45 tablet 3      clonazePAM (KLONOPIN) 1 MG tablet Take 2 tablets (2 mg) by mouth At Bedtime 90 tablet      cloNIDine (CATAPRES) 0.1 MG tablet Take 0.1 mg by mouth 2 times daily       cyclobenzaprine (FLEXERIL) 5 MG tablet Take 5 mg by mouth At Bedtime       eletriptan (RELPAX) 40 MG tablet Take 40 mg by mouth at onset of headache for migraine       FIBER PO Take 2 tablets by mouth daily       folic acid (FOLVITE) 400 MCG tablet Take 400 mcg by mouth daily       guaiFENesin-codeine (ROBITUSSIN AC) 100-10 MG/5ML SOLN solution Take 5 mLs by mouth every 4 hours as needed for cough 120 mL 0     hydrOXYzine (VISTARIL) 50 MG capsule Take 2 capsules (100 mg) by mouth daily 120 capsule 1     IBUPROFEN PO Take 400-600 mg by mouth every 6 hours as needed for moderate pain       Levothyroxine Sodium (SYNTHROID PO) Take 50 mcg by mouth daily       loperamide (IMODIUM A-D) 2 MG tablet Take 4 mg by mouth as needed for diarrhea       mirtazapine (REMERON) 45 MG tablet Take 1 tablet (45 mg) by mouth At Bedtime 90 tablet 1     Multiple Vitamins-Minerals (WOMENS MULTI VITAMIN & MINERAL PO) Take 1 tablet by mouth daily       NEW MED        OLANZapine (ZYPREXA) 5 MG tablet Take two tabs (10 mg) QAM and one tab (5 mg) at bedtime 270 tablet 0     Ondansetron (ZOFRAN ODT PO) Take 4 mg by mouth every 8 hours as needed for nausea       Pantoprazole Sodium (PROTONIX PO) Take 40 mg by mouth every morning (before breakfast)        UNABLE TO FIND MEDICATION NAME: NEXAPLON IMPLANT for birth control one year 2017       [DISCONTINUED] clonazePAM (KLONOPIN) 1 MG tablet Take 2 tablets (2 mg) by mouth At Bedtime (Patient not taking: Reported on 10/10/2018) 180 tablet 0       Reviewed and updated as needed this visit by clinical staff       Reviewed and updated as needed this visit by Provider         ROS:  Constitutional, HEENT, cardiovascular, pulmonary, GI, , musculoskeletal, neuro, skin, endocrine and psych systems are negative, except as otherwise  "noted.    OBJECTIVE:     /78  Pulse 120  Temp 98.2  F (36.8  C) (Oral)  Ht 5' 6\" (1.676 m)  Wt 114 lb (51.7 kg)  SpO2 95%  BMI 18.4 kg/m2  Body mass index is 18.4 kg/(m^2).   GENERAL: healthy, alert and no distress  EYES: Eyes grossly normal to inspection, PERRL and conjunctivae and sclerae normal  HENT: ear canals and TM's normal, nose and mouth without ulcers or lesions  NECK: no adenopathy, no asymmetry, masses, or scars and thyroid normal to palpation  RESP: lungs clear to auscultation - no rales, + rhonchi + wheezes  CV: regular rate and rhythm, normal S1 S2, no S3 or S4, no murmur, click or rub, no peripheral edema and peripheral pulses strong  ABDOMEN: soft, diffusely tender, no guarding, no rebound, no hepatosplenomegaly, no masses and bowel sounds normal  MS: no gross musculoskeletal defects noted, no edema  SKIN: no suspicious lesions or rashes  NEURO: Normal strength and tone, mentation intact and speech normal    Diagnostic Test Results:  Results for orders placed or performed in visit on 10/10/18 (from the past 24 hour(s))   CBC with platelets   Result Value Ref Range    WBC 10.8 4.0 - 11.0 10e9/L    RBC Count 4.37 3.8 - 5.2 10e12/L    Hemoglobin 14.7 11.7 - 15.7 g/dL    Hematocrit 44.1 35.0 - 47.0 %     (H) 78 - 100 fl    MCH 33.6 (H) 26.5 - 33.0 pg    MCHC 33.3 31.5 - 36.5 g/dL    RDW 14.5 10.0 - 15.0 %    Platelet Count 384 150 - 450 10e9/L   *UA reflex to Microscopic   Result Value Ref Range    Color Urine Yellow     Appearance Urine Clear     Glucose Urine Negative NEG^Negative mg/dL    Bilirubin Urine Negative NEG^Negative    Ketones Urine Negative NEG^Negative mg/dL    Specific Gravity Urine 1.010 1.003 - 1.035    Blood Urine Negative NEG^Negative    pH Urine 6.5 5.0 - 7.0 pH    Protein Albumin Urine Negative NEG^Negative mg/dL    Urobilinogen Urine 0.2 0.2 - 1.0 EU/dL    Nitrite Urine Negative NEG^Negative    Leukocyte Esterase Urine Negative NEG^Negative    Source Midstream " Urine    Hemoglobin A1c   Result Value Ref Range    Hemoglobin A1C 5.0 0 - 5.6 %       ASSESSMENT/PLAN:     Problem List Items Addressed This Visit     Pancreatitis    Relevant Orders    XR Chest 2 Views (Completed)    CBC with platelets (Completed)    Comprehensive metabolic panel (Completed)    *UA reflex to Microscopic (Completed)    Hemoglobin A1c (Completed)    Lipase (Completed)    Amylase (Completed)    GASTROENTEROLOGY ADULT REF CONSULT ONLY      Other Visit Diagnoses     Cough    -  Primary    Relevant Medications    guaiFENesin-codeine (ROBITUSSIN AC) 100-10 MG/5ML SOLN solution    albuterol (PROAIR HFA/PROVENTIL HFA/VENTOLIN HFA) 108 (90 Base) MCG/ACT inhaler    Other Relevant Orders    XR Chest 2 Views (Completed)    Hospital discharge follow-up               Assess CXR- no infiltrate, bronchitis with bronchospasm, start Robitussin AC, Albuterol inhaler, smoking cessation, advised for medications side effects   Refer to GI  ETOH abstinence discussed, currently denies drinking alcohol   Assess lab work for recurrent pancreatitis   Will need assessment with EGD, colonoscopy for the CT abnormality of the duodenum and colon   Follow up with psychiatry       Follow-Up:with results     Liborio Lincoln MD  [facility]

## 2018-10-10 NOTE — LETTER
[facility]  [address], [address]  [place] [address]  [phone]        October 15, 2018    Ana Laura Wharton  [address]   [facility] [address]            Dear Ms. Ana Laura Wharton:      The results of your recent labs were NORMAL.      If you have any further questions or problems, please contact our office.      Sincerely,        Liborio Lincoln M.D.

## 2018-10-10 NOTE — NURSING NOTE
"Vital signs:  Temp: 98.2  F (36.8  C) Temp src: Oral BP: 126/78 Pulse: 120     SpO2: 95 %     Height: 5' 6\" (167.6 cm) Weight: 114 lb (51.7 kg)  Estimated body mass index is 18.4 kg/(m^2) as calculated from the following:    Height as of this encounter: 5' 6\" (1.676 m).    Weight as of this encounter: 114 lb (51.7 kg).          "

## 2018-10-10 NOTE — MR AVS SNAPSHOT
After Visit Summary   10/10/2018    Ana Laura Wharton    MRN: [id number]           Patient Information     Date Of Birth          1970        Visit Information        Provider Department      10/10/2018 3:20 PM Liborio Lincoln MD [facility]        Today's Diagnoses     Cough    -  1    Alcohol-induced acute pancreatitis, unspecified complication status           Follow-ups after your visit        Additional Services     GASTROENTEROLOGY ADULT REF CONSULT ONLY       Preferred Location: [facility] [phone]      Please be aware that coverage of these services is subject to the terms and limitations of your health insurance plan.  Call member services at your health plan with any benefit or coverage questions.  Any procedures must be performed at a [facility] facility OR coordinated by your clinic's referral office.    Please bring the following with you to your appointment:    (1) Any X-Rays, CTs or MRIs which have been performed.  Contact the facility where they were done to arrange for  prior to your scheduled appointment.    (2) List of current medications   (3) This referral request   (4) Any documents/labs given to you for this referral                  Your next 10 appointments already scheduled     Oct 11, 2018 12:40 PM CDT   Office Visit with Belen Gao NP   [facility] ([facility]    [address]  [facility] [address] [phone]           Bring a current list of meds and any records pertaining to this visit. For Physicals, please bring immunization records and any forms needing to be filled out. Please arrive 10 minutes early to complete paperwork.              Who to contact     If you have questions or need follow up information about today's clinic visit or your schedule please contact [facility] directly at [phone].  Normal or non-critical lab and imaging results will be  "communicated to you by [employer]art, letter or phone within 4 business days after the clinic has received the results. If you do not hear from us within 7 days, please contact the clinic through [employer] or phone. If you have a critical or abnormal lab result, we will notify you by phone as soon as possible.  Submit refill requests through [employer] or call your pharmacy and they will forward the refill request to us. Please allow 3 business days for your refill to be completed.          Additional Information About Your Visit        Cleanify Information     [employer] gives you secure access to your electronic health record. If you see a primary care provider, you can also send messages to your care team and make appointments. If you have questions, please call your primary care clinic.  If you do not have a primary care provider, please call [phone] and they will assist you.        Care EveryWhere ID     This is your Care EveryWhere ID. This could be used by other organizations to access your [facility] medical records  [id number]        Your Vitals Were     Pulse Temperature Height Pulse Oximetry BMI (Body Mass Index)       120 98.2  F (36.8  C) (Oral) 5' 6\" (1.676 m) 95% 18.4 kg/m2        Blood Pressure from Last 3 Encounters:   10/10/18 126/78   09/17/18 100/72   09/07/18 (!) 141/96    Weight from Last 3 Encounters:   10/10/18 114 lb (51.7 kg)   09/17/18 118 lb 14.4 oz (53.9 kg)   09/07/18 115 lb (52.2 kg)              We Performed the Following     *UA reflex to Microscopic     Amylase     CBC with platelets     Comprehensive metabolic panel     GASTROENTEROLOGY ADULT REF CONSULT ONLY     Hemoglobin A1c     Lipase          Today's Medication Changes          These changes are accurate as of 10/10/18  4:21 PM.  If you have any questions, ask your nurse or doctor.               Start taking these medicines.        Dose/Directions    albuterol 108 (90 Base) MCG/ACT inhaler   Commonly known as:  PROAIR HFA/PROVENTIL " HFA/VENTOLIN HFA   Used for:  Cough   Started by:  Liborio Lincoln MD        Dose:  2 puff   Inhale 2 puffs into the lungs every 6 hours as needed for shortness of breath / dyspnea or wheezing   Quantity:  1 Inhaler   Refills:  0       guaiFENesin-codeine 100-10 MG/5ML Soln solution   Commonly known as:  ROBITUSSIN AC   Used for:  Cough   Started by:  Liborio Lincoln MD        Dose:  1 tsp.   Take 5 mLs by mouth every 4 hours as needed for cough   Quantity:  120 mL   Refills:  0            Where to get your medicines      [facility] medications [facility] [place] [address] [facility] [address] [facility] [address]     Phone:  [phone]     albuterol 108 (90 Base) MCG/ACT inhaler         Some of these will need a paper prescription and others can be bought over the counter.  Ask your nurse if you have questions.     Bring a paper prescription for each of these medications     guaiFENesin-codeine 100-10 MG/5ML Soln solution                Primary Care Provider Office Phone # Fax #    Terri Velazco [phone]       [address]  [facility] [address]        Equal Access to Services     OLGA LUTZ : Hadii valente ku hadasho Soomaali, waaxda luqadaha, qaybta kaalmada adeegyada, waxay zachin hayzohaib deluca. So [facility] [phone].    ATENCIÓN: Si habla español, tiene a gold disposición servicios gratuitos de asistencia lingüística. Llhortencia al [phone].    We comply with applicable federal civil rights laws and [place] laws. We do not discriminate on the basis of race, color, national origin, age, disability, sex, sexual orientation, or gender identity.            Thank you!     Thank you for choosing [facility]  for your care. Our goal is always to provide you with excellent care. Hearing back from our patients is one way we can continue to improve our services. Please take a few minutes to complete the written  survey that you may receive in the mail after your visit with us. Thank you!             Your Updated Medication List - Protect others around you: Learn how to safely use, store and throw away your medicines at www.disposemymeds.org.          This list is accurate as of 10/10/18  4:21 PM.  Always use your most recent med list.                   Brand Name Dispense Instructions for use Diagnosis    acetaminophen 325 MG tablet    TYLENOL    30 tablet    Take 3 tablets (975 mg) by mouth every 8 hours as needed for mild pain or fever    Traumatic cerebral edema without loss of consciousness without open intracranial wound, initial encounter (H)       albuterol 108 (90 Base) MCG/ACT inhaler    PROAIR HFA/PROVENTIL HFA/VENTOLIN HFA    1 Inhaler    Inhale 2 puffs into the lungs every 6 hours as needed for shortness of breath / dyspnea or wheezing    Cough       ciprofloxacin 250 MG tablet    CIPRO    45 tablet    Take 0.5 tablets (125 mg) by mouth daily For UTI prevention    Recurrent UTI       clonazePAM 1 MG tablet    klonoPIN    90 tablet    Take 2 tablets (2 mg) by mouth At Bedtime    Bipolar I disorder (H)       cloNIDine 0.1 MG tablet    CATAPRES     Take 0.1 mg by mouth 2 times daily        COREG 6.25 MG tablet   Generic drug:  carvedilol      Take 6.25 mg by mouth 2 times daily (with meals)        cyclobenzaprine 5 MG tablet    FLEXERIL     Take 5 mg by mouth At Bedtime        FIBER PO      Take 2 tablets by mouth daily        folic acid 400 MCG tablet    FOLVITE     Take 400 mcg by mouth daily        guaiFENesin-codeine 100-10 MG/5ML Soln solution    ROBITUSSIN AC    120 mL    Take 5 mLs by mouth every 4 hours as needed for cough    Cough       hydrOXYzine 50 MG capsule    VISTARIL    120 capsule    Take 2 capsules (100 mg) by mouth daily    Bipolar I disorder (H)       IBUPROFEN PO      Take 400-600 mg by mouth every 6 hours as needed for moderate pain        IMODIUM A-D 2 MG tablet   Generic drug:  loperamide       Take 4 mg by mouth as needed for diarrhea        mirtazapine 45 MG tablet    REMERON    90 tablet    Take 1 tablet (45 mg) by mouth At Bedtime    Bipolar I disorder (H)       NEW MED           OLANZapine 5 MG tablet    zyPREXA    270 tablet    Take two tabs (10 mg) QAM and one tab (5 mg) at bedtime    Bipolar I disorder (H)       PROTONIX PO      Take 40 mg by mouth every morning (before breakfast)        RELPAX 40 MG tablet   Generic drug:  eletriptan      Take 40 mg by mouth at onset of headache for migraine        SYNTHROID PO      Take 50 mcg by mouth daily        UNABLE TO FIND      MEDICATION NAME: NEXAPLON IMPLANT for birth control one year 2017        WOMENS MULTI VITAMIN & MINERAL PO      Take 1 tablet by mouth daily        ZOFRAN ODT PO      Take 4 mg by mouth every 8 hours as needed for nausea

## 2018-10-11 ENCOUNTER — TELEPHONE (OUTPATIENT)
Dept: INTERNAL MEDICINE | Facility: CLINIC | Age: 48
End: 2018-10-11

## 2018-10-11 ENCOUNTER — TELEPHONE (OUTPATIENT)
Dept: PSYCHIATRY | Facility: CLINIC | Age: 48
End: 2018-10-11

## 2018-10-11 LAB
ALBUMIN SERPL-MCNC: 4.2 G/DL (ref 3.4–5)
ALP SERPL-CCNC: 101 U/L (ref 40–150)
ALT SERPL W P-5'-P-CCNC: 13 U/L (ref 0–50)
AMYLASE SERPL-CCNC: 51 U/L (ref 30–110)
ANION GAP SERPL CALCULATED.3IONS-SCNC: 6 MMOL/L (ref 3–14)
AST SERPL W P-5'-P-CCNC: 24 U/L (ref 0–45)
BILIRUB SERPL-MCNC: 0.2 MG/DL (ref 0.2–1.3)
BUN SERPL-MCNC: 11 MG/DL (ref 7–30)
CALCIUM SERPL-MCNC: 9.8 MG/DL (ref 8.5–10.1)
CHLORIDE SERPL-SCNC: 105 MMOL/L (ref 94–109)
CO2 SERPL-SCNC: 28 MMOL/L (ref 20–32)
CREAT SERPL-MCNC: 0.98 MG/DL (ref 0.52–1.04)
GFR SERPL CREATININE-BSD FRML MDRD: 60 ML/MIN/1.7M2
GLUCOSE SERPL-MCNC: 95 MG/DL (ref 70–99)
POTASSIUM SERPL-SCNC: 4 MMOL/L (ref 3.4–5.3)
PROT SERPL-MCNC: 7.9 G/DL (ref 6.8–8.8)
SODIUM SERPL-SCNC: 139 MMOL/L (ref 133–144)

## 2018-10-11 NOTE — TELEPHONE ENCOUNTER
The patient stated that she is getting charged $600 for her last visit with Jaden Rdoriguez MD, due to her current insurance. She is getting new insurance in January 2019, but until then she is unable to see Dr. Rodriguez. She wanted to make sure that she can still get refills on medications despite not having an appointment until January.     The writer provided the patient with the phone number for Psych Billing to discuss her recent charges.    Action taken: Message routed to ORA Burnett    -Tila Carmen,

## 2018-10-26 ENCOUNTER — TELEPHONE (OUTPATIENT)
Dept: INTERNAL MEDICINE | Facility: CLINIC | Age: 48
End: 2018-10-26

## 2018-10-26 ENCOUNTER — MYC MEDICAL ADVICE (OUTPATIENT)
Dept: INTERNAL MEDICINE | Facility: CLINIC | Age: 48
End: 2018-10-26

## 2018-10-26 DIAGNOSIS — N39.0 RECURRENT UTI: ICD-10-CM

## 2018-10-26 NOTE — TELEPHONE ENCOUNTER
Panel Management Review      Patient has the following on her problem list:     Hypertension   Last three blood pressure readings:  BP Readings from Last 3 Encounters:   10/10/18 126/78   09/17/18 100/72   09/07/18 (!) 141/96     Blood pressure: Passed    HTN Guidelines:  Age 18-59 BP range:  Less than 140/90  Age 60-85 with Diabetes:  Less than 140/90  Age 60-85 without Diabetes:  less than 150/90      Composite cancer screening  Chart review shows that this patient is due/due soon for the following Pap Smear  Summary:    Patient is due/failing the following:   PAP    Action needed:   Patient needs office visit for a physical & pap.    Type of outreach:    Sent letter.    Questions for provider review:    None                                                                                                                                    Dot Morocho MA       Chart routed to none .

## 2018-10-26 NOTE — LETTER
[facility]  [address], [address]  [place] [address]  [phone]        October 26, 2018    Ana Laura Wharton  [address]   [facility] [address]            Dear Ana Laura,  In order to ensure we are providing the best quality care, we have reviewed your chart and see that you are due for a physical & pap.  Please call the clinic at your earliest convenience to schedule an appointment.   Thank you for trusting us with your health care.    Sincerely,        Dr. Terri Velazco

## 2018-10-26 NOTE — LETTER
[facility]  [address], [address]  [place] [address]  [phone]        November 12, 2018    Ana Laura Wharton  [address]   [facility] [address]            Dear Ana Laura,    We sent you a letter a couple of weeks ago informing you of health maintenance that is due. We hope that you received it. This letter is just a follow up to remind you to schedule an appointment.         Sincerely,        Your [facility] Care Team

## 2018-10-29 RX ORDER — CIPROFLOXACIN 250 MG/1
125 TABLET, FILM COATED ORAL DAILY
Qty: 45 TABLET | Refills: 3 | Status: SHIPPED | OUTPATIENT
Start: 2018-10-29 | End: 2019-01-09

## 2018-10-29 NOTE — TELEPHONE ENCOUNTER
Cipro rx resent to [employer] as pt reports they never received it though we do have a confirmation of receipt from 10/1/18.  TORITO Fishman R.N.

## 2018-10-30 NOTE — PROGRESS NOTES
Service Date: 10/08/2018      PSYCHIATRY CLINIC PROGRESS NOTE      The patient returns for medication management and supportive therapy.      Interim History:      Since the last visit, started drinking in July.  Hospitalized for alcohol pancreatitis.  Stopped drinking for past month.  Increased Remeron helped with AM crying.  Problems with comprehension and memory.      RECENT SYMPTOMS:      Depression:  Suicidal ideation - more than 30 days ago, not now.  Not depressed, anhedonia, sleeping 10 hours/night.  Poor appetite, lost weight.      Elevated Mood:  No manic symptoms.      Psychosis:  No delusions.      Panic Attacks:  While drinking.      Anxiety:  Excessive worry, feeling fearful, tense.      Trauma-Related Symptoms:  None, some nightmares.      ADVERSE EFFECTS:  Poor memory, dry mouth, poor appetite.      MEDICAL CONCERNS:  Pancreatitis, neck pain, hypertension.      SUBSTANCE USE:  Alcohol - sober 1 month.  Uses tobacco.  No opioids, cannabis, or other illicit drugs.      SOCIAL/FAMILY HISTORY:  Divorce finalized.  Lives alone.  Severe financial stresses.  Rarely sees 17yo son.  He is angry with Ana Laura.      MEDICAL/SURGICAL HISTORY:  See EMR medical problems list.      MEDICAL REVIEW OF SYSTEMS:  A comprehensive review of systems was performed and is negative other than as noted in the HPI.      ALLERGIES:  No new allergies.      CURRENT MEDICATIONS:  See EMR med sections.      VITALS:  See rooming note.      MENTAL STATUS EXAM:  Alert, well-groomed, cooperative.  Speech is normal rate and flow.  Mood reactive, affect smiling, thought process/associations logical.  No current SI, no psychosis.  Limited insight and judgment.  Oriented x 4.  Adequate attention span and concentration.  Recent and remote memory intact.  Fund of knowledge normal.      DIAGNOSIS AND ASSESSMENT:  Bipolar - most recent depressed.      PLAN:   1. Medications:  Slow taper of Klonopin given alcohol use disorder.   2. Therapy:  Not  currently.   3. RTC:  1 month.   4. Crisis numbers:  Provided routinely in AVS.      Treatment Risk Statement: The patient understands the risks, benefits, adverse effects, and alternatives. Agrees to treatment with the capacity to do so. No medical contraindications to treatment. Agrees to call clinic for any problems. The patient understands to call 911 or come to the nearest ED is life-threatening or urgent symptoms present.               ljl         PIEDAD SADLER MD             D: 10/30/2018   T: 10/30/2018   MT: LILI      Name:     KEE TINEO   MRN:      [id number]        Account:      [id number]   :      1970           Service Date: 10/08/2018      Document: [id number]

## 2018-10-31 ENCOUNTER — TELEPHONE (OUTPATIENT)
Dept: INTERNAL MEDICINE | Facility: CLINIC | Age: 48
End: 2018-10-31

## 2018-10-31 ENCOUNTER — OFFICE VISIT (OUTPATIENT)
Dept: INTERNAL MEDICINE | Facility: CLINIC | Age: 48
End: 2018-10-31
Payer: COMMERCIAL

## 2018-10-31 VITALS
HEIGHT: 66 IN | HEART RATE: 87 BPM | OXYGEN SATURATION: 98 % | TEMPERATURE: 98.1 F | DIASTOLIC BLOOD PRESSURE: 76 MMHG | BODY MASS INDEX: 18.53 KG/M2 | RESPIRATION RATE: 17 BRPM | SYSTOLIC BLOOD PRESSURE: 118 MMHG | WEIGHT: 115.3 LBS

## 2018-10-31 DIAGNOSIS — R06.2 WHEEZING: Primary | ICD-10-CM

## 2018-10-31 DIAGNOSIS — N32.81 OVERACTIVE BLADDER: ICD-10-CM

## 2018-10-31 LAB
FEF 25/75: 1.18
FEV-1: 1.87
FEV1/FVC: 68
FVC: 2.76

## 2018-10-31 PROCEDURE — 99214 OFFICE O/P EST MOD 30 MIN: CPT | Mod: 25 | Performed by: INTERNAL MEDICINE

## 2018-10-31 PROCEDURE — 94010 BREATHING CAPACITY TEST: CPT | Performed by: INTERNAL MEDICINE

## 2018-10-31 RX ORDER — CODEINE PHOSPHATE AND GUAIFENESIN 10; 100 MG/5ML; MG/5ML
1 SOLUTION ORAL EVERY 4 HOURS PRN
Qty: 473 ML | Refills: 0 | Status: SHIPPED | OUTPATIENT
Start: 2018-10-31 | End: 2019-01-09

## 2018-10-31 RX ORDER — TOLTERODINE 2 MG/1
2 CAPSULE, EXTENDED RELEASE ORAL DAILY
Qty: 90 CAPSULE | Refills: 3 | Status: SHIPPED | OUTPATIENT
Start: 2018-10-31 | End: 2019-06-04 | Stop reason: ALTCHOICE

## 2018-10-31 RX ORDER — TRIAMCINOLONE ACETONIDE 1 MG/G
OINTMENT TOPICAL
Status: ON HOLD | COMMUNITY
Start: 2018-09-15 | End: 2019-05-29

## 2018-10-31 RX ORDER — CLINDAMYCIN PHOSPHATE 10 MG/G
GEL TOPICAL 2 TIMES DAILY
Status: ON HOLD | COMMUNITY
End: 2019-05-29

## 2018-10-31 ASSESSMENT — ANXIETY QUESTIONNAIRES
1. FEELING NERVOUS, ANXIOUS, OR ON EDGE: SEVERAL DAYS
IF YOU CHECKED OFF ANY PROBLEMS ON THIS QUESTIONNAIRE, HOW DIFFICULT HAVE THESE PROBLEMS MADE IT FOR YOU TO DO YOUR WORK, TAKE CARE OF THINGS AT HOME, OR GET ALONG WITH OTHER PEOPLE: SOMEWHAT DIFFICULT
3. WORRYING TOO MUCH ABOUT DIFFERENT THINGS: SEVERAL DAYS
6. BECOMING EASILY ANNOYED OR IRRITABLE: SEVERAL DAYS
5. BEING SO RESTLESS THAT IT IS HARD TO SIT STILL: SEVERAL DAYS
2. NOT BEING ABLE TO STOP OR CONTROL WORRYING: SEVERAL DAYS
GAD7 TOTAL SCORE: 7
7. FEELING AFRAID AS IF SOMETHING AWFUL MIGHT HAPPEN: SEVERAL DAYS

## 2018-10-31 ASSESSMENT — PATIENT HEALTH QUESTIONNAIRE - PHQ9
SUM OF ALL RESPONSES TO PHQ QUESTIONS 1-9: 8
5. POOR APPETITE OR OVEREATING: SEVERAL DAYS

## 2018-10-31 NOTE — MR AVS SNAPSHOT
After Visit Summary   10/31/2018    Ana Laura Wharton    MRN: [id number]           Patient Information     Date Of Birth          1970        Visit Information        Provider Department      10/31/2018 3:20 PM Liborio Lincoln MD [facility]        Today's Diagnoses     Wheezing    -  1    Overactive bladder           Follow-ups after your visit        Your next 10 appointments already scheduled     Jan 09, 2019 10:20 AM CST   [facility]    [address]  [facility] [id number] [phone]            Marito 10, 2019  3:30 PM CST   Adult Med Follow UP with Jaden Rodriguez MD   [facility] ([facility])    [place] [id number]  [address] [address] [phone]              Who to contact     If you have questions or need follow up information about today's clinic visit or your schedule please contact [facility] directly at [phone].  Normal or non-critical lab and imaging results will be communicated to you by Shiram Credithart, letter or phone within 4 business days after the clinic has received the results. If you do not hear from us within 7 days, please contact the clinic through MyChart or phone. If you have a critical or abnormal lab result, we will notify you by phone as soon as possible.  Submit refill requests through Techoz or call your pharmacy and they will forward the refill request to us. Please allow 3 business days for your refill to be completed.          Additional Information About Your Visit        MyChart Information     Te[employer]z gives you secure access to your electronic health record. If you see a primary care provider, you can also send messages to your care team and make appointments. If you have questions, please call your primary care clinic.  If you do not have a primary care provider,  "please call [phone] and they will assist you.        Care EveryWhere ID     This is your Care EveryWhere ID. This could be used by other organizations to access your [facility] medical records  [id number]        Your Vitals Were     Pulse Temperature Respirations Height Pulse Oximetry BMI (Body Mass Index)    87 98.1  F (36.7  C) (Oral) 17 5' 6\" (1.676 m) 98% 18.61 kg/m2       Blood Pressure from Last 3 Encounters:   10/31/18 118/76   10/10/18 126/78   10/08/18 113/80    Weight from Last 3 Encounters:   10/31/18 115 lb 4.8 oz (52.3 kg)   10/10/18 114 lb (51.7 kg)   10/08/18 116 lb (52.6 kg)              We Performed the Following     Spirometry, Breathing Capacity: Normal Order, Clinic Performed          Today's Medication Changes          These changes are accurate as of 10/31/18  4:04 PM.  If you have any questions, ask your nurse or doctor.               Start taking these medicines.        Dose/Directions    fluticasone-salmeterol 250-50 MCG/DOSE diskus inhaler   Commonly known as:  ADVAIR   Used for:  Wheezing   Started by:  Liborio Lincoln MD        Dose:  1 puff   Inhale 1 puff into the lungs 2 times daily   Quantity:  1 Inhaler   Refills:  1       tolterodine 2 MG 24 hr capsule   Commonly known as:  DETROL LA   Used for:  Overactive bladder   Started by:  Lbiorio Lincoln MD        Dose:  2 mg   Take 1 capsule (2 mg) by mouth daily   Quantity:  90 capsule   Refills:  3         These medicines have changed or have updated prescriptions.        Dose/Directions    * guaiFENesin-codeine 100-10 MG/5ML Soln solution   Commonly known as:  ROBITUSSIN AC   This may have changed:  Another medication with the same name was added. Make sure you understand how and when to take each.   Used for:  Cough   Changed by:  Liborio Lincoln MD        Dose:  1 tsp.   Take 5 mLs by mouth every 4 hours as needed for cough   Quantity:  120 mL   Refills:  0       * guaiFENesin-codeine 100-10 MG/5ML Soln solution "   Commonly known as:  ROBITUSSIN AC   This may have changed:  You were already taking a medication with the same name, and this prescription was added. Make sure you understand how and when to take each.   Used for:  Wheezing   Changed by:  Liborio Lincoln MD        Dose:  1 tsp.   Take 5 mLs by mouth every 4 hours as needed for cough   Quantity:  473 mL   Refills:  0       * Notice:  This list has 2 medication(s) that are the same as other medications prescribed for you. Read the directions carefully, and ask your doctor or other care provider to review them with you.         Where to get your medicines      These medications were sent to [facility] [address] [place] [address] [address] [facility] [address]     Phone:  [phone]     fluticasone-salmeterol 250-50 MCG/DOSE diskus inhaler    tolterodine 2 MG 24 hr capsule         Some of these will need a paper prescription and others can be bought over the counter.  Ask your nurse if you have questions.     Bring a paper prescription for each of these medications     guaiFENesin-codeine 100-10 MG/5ML Soln solution                Primary Care Provider Office Phone # Fax #    Liborio Lincoln [phone]       [address] [facility] [address]        Equal Access to Services     RADHA LUTZ : Hadii aad ku hadasho Soomaali, waaxda luqadaha, qaybta kaalmada adeegyada, waxay reno hayzohaib deluca. So [facility] [phone].    ATENCIÓN: Si habla español, tiene a gold disposición servicios gratuitos de asistencia lingüística. Llame al [phone].    We comply with applicable federal civil rights laws and [place] laws. We do not discriminate on the basis of race, color, national origin, age, disability, sex, sexual orientation, or gender identity.            Thank you!     Thank you for choosing [facility]  for your care. Our goal is always  to provide you with excellent care. Hearing back from our patients is one way we can continue to improve our services. Please take a few minutes to complete the written survey that you may receive in the mail after your visit with us. Thank you!             Your Updated Medication List - Protect others around you: Learn how to safely use, store and throw away your medicines at www.disposemymeds.org.          This list is accurate as of 10/31/18  4:04 PM.  Always use your most recent med list.                   Brand Name Dispense Instructions for use Diagnosis    acetaminophen 325 MG tablet    TYLENOL    30 tablet    Take 3 tablets (975 mg) by mouth every 8 hours as needed for mild pain or fever    Traumatic cerebral edema without loss of consciousness without open intracranial wound, initial encounter (H)       albuterol 108 (90 Base) MCG/ACT inhaler    PROAIR HFA/PROVENTIL HFA/VENTOLIN HFA    1 Inhaler    Inhale 2 puffs into the lungs every 6 hours as needed for shortness of breath / dyspnea or wheezing    Cough       ciprofloxacin 250 MG tablet    CIPRO    45 tablet    Take 0.5 tablets (125 mg) by mouth daily For UTI prevention    Recurrent UTI       CLINDAGEL 1 % topical gel   Generic drug:  clindamycin      Apply topically 2 times daily        clonazePAM 1 MG tablet    klonoPIN    90 tablet    Take 2 tablets (2 mg) by mouth At Bedtime    Bipolar I disorder (H)       cloNIDine 0.1 MG tablet    CATAPRES     Take 0.1 mg by mouth 2 times daily        COREG 6.25 MG tablet   Generic drug:  carvedilol      Take 6.25 mg by mouth 2 times daily (with meals)        cyclobenzaprine 5 MG tablet    FLEXERIL     Take 5 mg by mouth At Bedtime        FIBER PO      Take 2 tablets by mouth daily        fluticasone-salmeterol 250-50 MCG/DOSE diskus inhaler    ADVAIR    1 Inhaler    Inhale 1 puff into the lungs 2 times daily    Wheezing       folic acid 400 MCG tablet    FOLVITE     Take 400 mcg by mouth daily        *  guaiFENesin-codeine 100-10 MG/5ML Soln solution    ROBITUSSIN AC    120 mL    Take 5 mLs by mouth every 4 hours as needed for cough    Cough       * guaiFENesin-codeine 100-10 MG/5ML Soln solution    ROBITUSSIN AC    473 mL    Take 5 mLs by mouth every 4 hours as needed for cough    Wheezing       hydrOXYzine 50 MG capsule    VISTARIL    120 capsule    Take 2 capsules (100 mg) by mouth daily    Bipolar I disorder (H)       IBUPROFEN PO      Take 400-600 mg by mouth every 6 hours as needed for moderate pain        IMODIUM A-D 2 MG tablet   Generic drug:  loperamide      Take 4 mg by mouth as needed for diarrhea        mirtazapine 45 MG tablet    REMERON    90 tablet    Take 1 tablet (45 mg) by mouth At Bedtime    Bipolar I disorder (H)       NEW MED           OLANZapine 5 MG tablet    zyPREXA    270 tablet    Take two tabs (10 mg) QAM and one tab (5 mg) at bedtime    Bipolar I disorder (H)       PROTONIX PO      Take 40 mg by mouth every morning (before breakfast)        RELPAX 40 MG tablet   Generic drug:  eletriptan      Take 40 mg by mouth at onset of headache for migraine        SYNTHROID PO      Take 50 mcg by mouth daily        tolterodine 2 MG 24 hr capsule    DETROL LA    90 capsule    Take 1 capsule (2 mg) by mouth daily    Overactive bladder       triamcinolone 0.1 % ointment    KENALOG          UNABLE TO FIND      MEDICATION NAME: NEXAPLON IMPLANT for birth control one year 2017        WOMENS MULTI VITAMIN & MINERAL PO      Take 1 tablet by mouth daily        ZOFRAN ODT PO      Take 4 mg by mouth every 8 hours as needed for nausea        * Notice:  This list has 2 medication(s) that are the same as other medications prescribed for you. Read the directions carefully, and ask your doctor or other care provider to review them with you.

## 2018-10-31 NOTE — TELEPHONE ENCOUNTER
Pt was seen last week and her symptoms are worsening. Please call pt to advise.       Thank you,   Rodrick KENDALL   Central Scheduling  [phone]

## 2018-10-31 NOTE — PROGRESS NOTES
SUBJECTIVE:   Ana Laura Wharton is a 48 year old female who presents to clinic today for the following health issues:      Patient is seen for a follow up visit.    Has had chronic cough for over 2 months. Scarce phlegm production. Has had wheezing, chest pain with coughing. Mild SOB on exertion. No fever, chills. No nausea, vomiting. Has h/o smoking, still smokes, trying to cut back. CXR done 3 weeks ago was normal, mild apical lungs fibrosis.   Concern for increased urine frequency and urgency, has to go every hour during the day and twice at night. Has h/o UTIs, on chronic suppressive treatment with Cipro. No flank pain , no hematuria or dysuria.       Problem list and histories reviewed & adjusted, as indicated.  Additional history: as documented    Patient Active Problem List   Diagnosis     Depressed bipolar I disorder in full remission (H)     Anxiety disorder     Suicide ideation     Bipolar disorder current episode depressed (H)     Overdose     Depression     Mood disorder (H)     Suicidal ideations     Bipolar I disorder (H)     Acute renal failure (H)     Alternating exotropia     Anxiety state     Other bipolar disorders     Personality disorder (H)     Tear film insufficiency     Dysfunctions associated with sleep stages or arousal from sleep     Abnormal finding of blood chemistry     Esophageal reflux     Other specified hypothyroidism     Impulse control disorder     Acidosis     Low back pain     Myopia     Opioid dependence (H)     Orofacial dyskinesia     Intractable migraine     Screening for malignant neoplasm of cervix     Pure hypercholesterolemia     Poisoning by 4-aminophenol derivatives, undetermined intent     Essential hypertension     Left knee pain     Aftercare following surgery of the musculoskeletal system     UTI (urinary tract infection)     Acute cystitis     Clostridium difficile colitis     Drug overdose     Calcium channel blocker overdose     Intentional acetaminophen  overdose (H)     Alcoholic intoxication with complication (H)     Hypotension due to medication     Acute renal failure, unspecified acute renal failure type (H)     Paroxysmal atrial fibrillation (H)     Acute respiratory failure with hypoxia (H)     Acute pulmonary edema (H)     Spontaneous pneumothorax     PRES (posterior reversible encephalopathy syndrome)     Pancreatitis     Past Surgical History:   Procedure Laterality Date     ARTHROSCOPY KNEE      L knee     CERVIX SURGERY      for pre-cancerous changes     left knee surgery       ORTHOPEDIC SURGERY      L shoulder     THORACIC SURGERY      for pneumothorax, pleurodesis and lobe resection       Social History   Substance Use Topics     Smoking status: Current Some Day Smoker     Types: Cigarettes     Last attempt to quit: 1/1/1997     Smokeless tobacco: Never Used     Alcohol use No      Comment: Quit on 09/07/18     Family History   Problem Relation Age of Onset     Depression Mother      Lipids Father      hyperlipidemia     Macular Degeneration Father          Current Outpatient Prescriptions   Medication Sig Dispense Refill     acetaminophen (TYLENOL) 325 MG tablet Take 3 tablets (975 mg) by mouth every 8 hours as needed for mild pain or fever 30 tablet 0     albuterol (PROAIR HFA/PROVENTIL HFA/VENTOLIN HFA) 108 (90 Base) MCG/ACT inhaler Inhale 2 puffs into the lungs every 6 hours as needed for shortness of breath / dyspnea or wheezing 1 Inhaler 0     carvedilol (COREG) 6.25 MG tablet Take 6.25 mg by mouth 2 times daily (with meals)       ciprofloxacin (CIPRO) 250 MG tablet Take 0.5 tablets (125 mg) by mouth daily For UTI prevention 45 tablet 3     clindamycin (CLINDAGEL) 1 % topical gel Apply topically 2 times daily       clonazePAM (KLONOPIN) 1 MG tablet Take 2 tablets (2 mg) by mouth At Bedtime 90 tablet      cloNIDine (CATAPRES) 0.1 MG tablet Take 0.1 mg by mouth 2 times daily       cyclobenzaprine (FLEXERIL) 5 MG tablet Take 5 mg by mouth At  Bedtime       eletriptan (RELPAX) 40 MG tablet Take 40 mg by mouth at onset of headache for migraine       FIBER PO Take 2 tablets by mouth daily       fluticasone-salmeterol (ADVAIR) 250-50 MCG/DOSE diskus inhaler Inhale 1 puff into the lungs 2 times daily 1 Inhaler 1     folic acid (FOLVITE) 400 MCG tablet Take 400 mcg by mouth daily       guaiFENesin-codeine (ROBITUSSIN AC) 100-10 MG/5ML SOLN solution Take 5 mLs by mouth every 4 hours as needed for cough 473 mL 0     hydrOXYzine (VISTARIL) 50 MG capsule Take 2 capsules (100 mg) by mouth daily 120 capsule 1     IBUPROFEN PO Take 400-600 mg by mouth every 6 hours as needed for moderate pain       Levothyroxine Sodium (SYNTHROID PO) Take 50 mcg by mouth daily       loperamide (IMODIUM A-D) 2 MG tablet Take 4 mg by mouth as needed for diarrhea       mirtazapine (REMERON) 45 MG tablet Take 1 tablet (45 mg) by mouth At Bedtime 90 tablet 1     Multiple Vitamins-Minerals (WOMENS MULTI VITAMIN & MINERAL PO) Take 1 tablet by mouth daily       OLANZapine (ZYPREXA) 5 MG tablet Take two tabs (10 mg) QAM and one tab (5 mg) at bedtime 270 tablet 0     Ondansetron (ZOFRAN ODT PO) Take 4 mg by mouth every 8 hours as needed for nausea       Pantoprazole Sodium (PROTONIX PO) Take 40 mg by mouth every morning (before breakfast)        tolterodine (DETROL LA) 2 MG 24 hr capsule Take 1 capsule (2 mg) by mouth daily 90 capsule 3     UNABLE TO FIND MEDICATION NAME: NEXAPLON IMPLANT for birth control one year 2017       guaiFENesin-codeine (ROBITUSSIN AC) 100-10 MG/5ML SOLN solution Take 5 mLs by mouth every 4 hours as needed for cough (Patient not taking: Reported on 10/31/2018) 120 mL 0     NEW MED        triamcinolone (KENALOG) 0.1 % ointment          Reviewed and updated as needed this visit by clinical staff  Tobacco  Allergies  Meds  Med Hx  Surg Hx  Fam Hx  Soc Hx      Reviewed and updated as needed this visit by Provider         ROS:  Constitutional, HEENT, cardiovascular,  "pulmonary, gi and gu systems are negative, except as otherwise noted.    OBJECTIVE:     /76 (BP Location: Left arm, Patient Position: Chair, Cuff Size: Adult Regular)  Pulse 87  Temp 98.1  F (36.7  C) (Oral)  Resp 17  Ht 5' 6\" (1.676 m)  Wt 115 lb 4.8 oz (52.3 kg)  SpO2 98%  BMI 18.61 kg/m2  Body mass index is 18.61 kg/(m^2).   GENERAL: healthy, alert and no distress  NECK: no adenopathy, no asymmetry, masses, or scars and thyroid normal to palpation  RESP: lungs clear to auscultation - no rales, rhonchi or wheezes  CV: regular rate and rhythm, normal S1 S2, no S3 or S4, no murmur, click or rub, no peripheral edema and peripheral pulses strong  ABDOMEN: soft, nontender, no hepatosplenomegaly, no masses and bowel sounds normal  MS: no gross musculoskeletal defects noted, no edema    Diagnostic Test Results:  none     ASSESSMENT/PLAN:     Problem List Items Addressed This Visit     None      Visit Diagnoses     Wheezing    -  Primary    Relevant Medications    fluticasone-salmeterol (ADVAIR) 250-50 MCG/DOSE diskus inhaler    guaiFENesin-codeine (ROBITUSSIN AC) 100-10 MG/5ML SOLN solution    Other Relevant Orders    Spirometry, Breathing Capacity: Normal Order, Clinic Performed (Completed)    Overactive bladder        Relevant Medications    tolterodine (DETROL LA) 2 MG 24 hr capsule           Start on Advair, trial for one month, continue Albuterol and Robitussin AC,   assess spirometry - shows moderate obstruction FEV1 66%  Smoking cessation advised   Trial of Detrol for one month.   Advised for medications side effects     Follow-Up:in 1 month    Liborio Lincoln MD  [facility]    "

## 2018-11-01 ENCOUNTER — DOCUMENTATION ONLY (OUTPATIENT)
Dept: OTHER | Facility: CLINIC | Age: 48
End: 2018-11-01

## 2018-11-01 ASSESSMENT — ANXIETY QUESTIONNAIRES: GAD7 TOTAL SCORE: 7

## 2018-11-14 ENCOUNTER — TELEPHONE (OUTPATIENT)
Dept: INTERNAL MEDICINE | Facility: CLINIC | Age: 48
End: 2018-11-14

## 2018-11-14 NOTE — TELEPHONE ENCOUNTER
Prior Authorization Retail Medication Request    Medication/Dose: proair  ICD code (if different than what is on RX):    Previously Tried and Failed:    Rationale:      Insurance Name:  [employer] [phone]  Insurance ID: [id number]      Pharmacy Information (if different than what is on RX)  Name:  [facility]  Phone:  [phone]

## 2018-11-14 NOTE — TELEPHONE ENCOUNTER
This PA request was submitted to the insurance by the Central Prior Authorization Team.  If you have any questions in regards to this request, we can be reached at [phone].     Thanks    PA Initiation    Medication: proair  Insurance Company: [employer] - Phone [phone] Fax [phone]  Pharmacy Filling the Rx: [place], [place] - [address]  Filling Pharmacy Phone: [phone]  Filling Pharmacy Fax:    Start Date: 11/14/2018

## 2018-11-14 NOTE — TELEPHONE ENCOUNTER
A PA is not needed for this medication because it is covered under the patient's plan. The pharmacy confirmed that the medication was processed successfully and they received a paid claim.     Prior Authorization Not Needed per Insurance    Medication: proair - PA NOT NEEDED   Insurance Company: [employer] - Phone [phone] Fax [phone]  Expected CoPay:      Pharmacy Filling the Rx: [facility] [id number] [facility]  Pharmacy Notified: Yes  Patient Notified: Yes

## 2018-12-04 DIAGNOSIS — F31.9 BIPOLAR I DISORDER (H): ICD-10-CM

## 2018-12-04 RX ORDER — OLANZAPINE 5 MG/1
TABLET ORAL
Qty: 90 TABLET | Refills: 1 | Status: SHIPPED | OUTPATIENT
Start: 2018-12-04 | End: 2019-01-09

## 2018-12-04 NOTE — TELEPHONE ENCOUNTER
Medication requested: Olanzapine 5 mg tabs  Last refilled: not indicated - mail order pharmacy  Qty: 270      Last seen: 10-8-19  RTC: 1 mo  Cancel: 0  No-show: 0  Next appt: 1-9-18    Refill decision: Refilled for 30 days per protocol with one additional fill.    Will send FYI to provider as is outside RTC timeframe.      Kathleen M Doege RN

## 2019-01-03 ENCOUNTER — TRANSFERRED RECORDS (OUTPATIENT)
Dept: HEALTH INFORMATION MANAGEMENT | Facility: CLINIC | Age: 49
End: 2019-01-03

## 2019-01-09 ENCOUNTER — OFFICE VISIT (OUTPATIENT)
Dept: PSYCHIATRY | Facility: CLINIC | Age: 49
End: 2019-01-09
Attending: PSYCHIATRY & NEUROLOGY
Payer: COMMERCIAL

## 2019-01-09 ENCOUNTER — OFFICE VISIT (OUTPATIENT)
Dept: INTERNAL MEDICINE | Facility: CLINIC | Age: 49
End: 2019-01-09
Payer: COMMERCIAL

## 2019-01-09 VITALS
OXYGEN SATURATION: 99 % | SYSTOLIC BLOOD PRESSURE: 120 MMHG | BODY MASS INDEX: 18.66 KG/M2 | HEART RATE: 87 BPM | WEIGHT: 112 LBS | RESPIRATION RATE: 16 BRPM | HEIGHT: 65 IN | TEMPERATURE: 98.1 F | DIASTOLIC BLOOD PRESSURE: 78 MMHG

## 2019-01-09 DIAGNOSIS — Z11.3 SCREEN FOR STD (SEXUALLY TRANSMITTED DISEASE): ICD-10-CM

## 2019-01-09 DIAGNOSIS — Z00.00 ENCOUNTER FOR PREVENTATIVE ADULT HEALTH CARE EXAMINATION: Primary | ICD-10-CM

## 2019-01-09 DIAGNOSIS — E03.9 ACQUIRED HYPOTHYROIDISM: ICD-10-CM

## 2019-01-09 DIAGNOSIS — B37.0 ORAL THRUSH: ICD-10-CM

## 2019-01-09 DIAGNOSIS — N39.0 RECURRENT UTI: ICD-10-CM

## 2019-01-09 DIAGNOSIS — N31.8 HYPERACTIVITY OF BLADDER: ICD-10-CM

## 2019-01-09 DIAGNOSIS — F31.9 BIPOLAR I DISORDER (H): ICD-10-CM

## 2019-01-09 DIAGNOSIS — K21.9 GASTROESOPHAGEAL REFLUX DISEASE WITHOUT ESOPHAGITIS: ICD-10-CM

## 2019-01-09 DIAGNOSIS — Z11.59 ENCOUNTER FOR SCREENING FOR OTHER VIRAL DISEASES: ICD-10-CM

## 2019-01-09 DIAGNOSIS — I10 ESSENTIAL HYPERTENSION: ICD-10-CM

## 2019-01-09 DIAGNOSIS — Z12.31 ENCOUNTER FOR SCREENING MAMMOGRAM FOR BREAST CANCER: ICD-10-CM

## 2019-01-09 PROCEDURE — 99213 OFFICE O/P EST LOW 20 MIN: CPT | Mod: 25 | Performed by: INTERNAL MEDICINE

## 2019-01-09 PROCEDURE — 80061 LIPID PANEL: CPT | Performed by: INTERNAL MEDICINE

## 2019-01-09 PROCEDURE — 87624 HPV HI-RISK TYP POOLED RSLT: CPT | Performed by: INTERNAL MEDICINE

## 2019-01-09 PROCEDURE — 86696 HERPES SIMPLEX TYPE 2 TEST: CPT | Performed by: INTERNAL MEDICINE

## 2019-01-09 PROCEDURE — 86803 HEPATITIS C AB TEST: CPT | Performed by: INTERNAL MEDICINE

## 2019-01-09 PROCEDURE — G0145 SCR C/V CYTO,THINLAYER,RESCR: HCPCS | Performed by: INTERNAL MEDICINE

## 2019-01-09 PROCEDURE — 84443 ASSAY THYROID STIM HORMONE: CPT | Performed by: INTERNAL MEDICINE

## 2019-01-09 PROCEDURE — 86780 TREPONEMA PALLIDUM: CPT | Performed by: INTERNAL MEDICINE

## 2019-01-09 PROCEDURE — 87491 CHLMYD TRACH DNA AMP PROBE: CPT | Performed by: INTERNAL MEDICINE

## 2019-01-09 PROCEDURE — 87389 HIV-1 AG W/HIV-1&-2 AB AG IA: CPT | Performed by: INTERNAL MEDICINE

## 2019-01-09 PROCEDURE — 36415 COLL VENOUS BLD VENIPUNCTURE: CPT | Performed by: INTERNAL MEDICINE

## 2019-01-09 PROCEDURE — G0124 SCREEN C/V THIN LAYER BY MD: HCPCS | Performed by: INTERNAL MEDICINE

## 2019-01-09 PROCEDURE — G0476 HPV COMBO ASSAY CA SCREEN: HCPCS | Performed by: INTERNAL MEDICINE

## 2019-01-09 PROCEDURE — 86695 HERPES SIMPLEX TYPE 1 TEST: CPT | Performed by: INTERNAL MEDICINE

## 2019-01-09 PROCEDURE — G0499 HEPB SCREEN HIGH RISK INDIV: HCPCS | Performed by: INTERNAL MEDICINE

## 2019-01-09 PROCEDURE — 99396 PREV VISIT EST AGE 40-64: CPT | Performed by: INTERNAL MEDICINE

## 2019-01-09 PROCEDURE — 87591 N.GONORRHOEAE DNA AMP PROB: CPT | Performed by: INTERNAL MEDICINE

## 2019-01-09 RX ORDER — CARVEDILOL 6.25 MG/1
6.25 TABLET ORAL 2 TIMES DAILY WITH MEALS
Qty: 180 TABLET | Refills: 3 | Status: ON HOLD | OUTPATIENT
Start: 2019-01-09 | End: 2019-05-29

## 2019-01-09 RX ORDER — BUSPIRONE HYDROCHLORIDE 15 MG/1
TABLET ORAL
Qty: 180 TABLET | Refills: 0 | Status: SHIPPED | OUTPATIENT
Start: 2019-01-09 | End: 2019-01-14

## 2019-01-09 RX ORDER — CLONIDINE HYDROCHLORIDE 0.1 MG/1
0.1 TABLET ORAL 2 TIMES DAILY
Qty: 180 TABLET | Refills: 3 | Status: ON HOLD | OUTPATIENT
Start: 2019-01-09 | End: 2019-05-29

## 2019-01-09 RX ORDER — FLUCONAZOLE 100 MG/1
100 TABLET ORAL DAILY
Qty: 15 TABLET | Refills: 3 | Status: ON HOLD | OUTPATIENT
Start: 2019-01-09 | End: 2019-05-29

## 2019-01-09 RX ORDER — CYCLOBENZAPRINE HCL 5 MG
5 TABLET ORAL AT BEDTIME
Qty: 90 TABLET | Refills: 1 | Status: SHIPPED | OUTPATIENT
Start: 2019-01-09 | End: 2019-03-01

## 2019-01-09 RX ORDER — PANTOPRAZOLE SODIUM 40 MG/1
40 TABLET, DELAYED RELEASE ORAL
Qty: 90 TABLET | Refills: 3 | Status: SHIPPED | OUTPATIENT
Start: 2019-01-09 | End: 2020-01-13

## 2019-01-09 RX ORDER — HYDROXYZINE PAMOATE 50 MG/1
100 CAPSULE ORAL DAILY
Qty: 120 CAPSULE | Refills: 1 | Status: SHIPPED | OUTPATIENT
Start: 2019-01-09 | End: 2019-04-01

## 2019-01-09 RX ORDER — MIRTAZAPINE 45 MG/1
45 TABLET, FILM COATED ORAL AT BEDTIME
Qty: 90 TABLET | Refills: 1 | Status: SHIPPED | OUTPATIENT
Start: 2019-01-09 | End: 2019-02-11

## 2019-01-09 RX ORDER — OLANZAPINE 5 MG/1
TABLET ORAL
Qty: 270 TABLET | Refills: 1 | Status: SHIPPED | OUTPATIENT
Start: 2019-01-09 | End: 2019-03-18

## 2019-01-09 RX ORDER — CLONAZEPAM 1 MG/1
2 TABLET ORAL AT BEDTIME
Qty: 60 TABLET | Refills: 0 | Status: SHIPPED | OUTPATIENT
Start: 2019-01-09 | End: 2019-02-11

## 2019-01-09 RX ORDER — CIPROFLOXACIN 250 MG/1
125 TABLET, FILM COATED ORAL DAILY
Qty: 45 TABLET | Refills: 3 | Status: SHIPPED | OUTPATIENT
Start: 2019-01-09 | End: 2019-11-06

## 2019-01-09 RX ORDER — MIRABEGRON 25 MG/1
25 TABLET, FILM COATED, EXTENDED RELEASE ORAL DAILY
Qty: 30 TABLET | Refills: 3 | Status: SHIPPED | OUTPATIENT
Start: 2019-01-09 | End: 2019-03-15

## 2019-01-09 RX ORDER — LEVOTHYROXINE SODIUM 50 UG/1
50 TABLET ORAL DAILY
Qty: 90 TABLET | Refills: 3 | Status: SHIPPED | OUTPATIENT
Start: 2019-01-09 | End: 2019-10-23

## 2019-01-09 ASSESSMENT — ENCOUNTER SYMPTOMS
MYALGIAS: 1
ARTHRALGIAS: 1
EYE PAIN: 0
JOINT SWELLING: 0
DIARRHEA: 0
HEADACHES: 1
CONSTIPATION: 0
NAUSEA: 0
SHORTNESS OF BREATH: 0
BREAST MASS: 0
PALPITATIONS: 0
PARESTHESIAS: 0
DYSURIA: 0
FREQUENCY: 1
WEAKNESS: 0
DIZZINESS: 0
SORE THROAT: 0
HEARTBURN: 1
HEMATURIA: 0
ABDOMINAL PAIN: 0
HEMATOCHEZIA: 0
CHILLS: 0
COUGH: 0

## 2019-01-09 ASSESSMENT — MIFFLIN-ST. JEOR: SCORE: 1142.87

## 2019-01-09 ASSESSMENT — PATIENT HEALTH QUESTIONNAIRE - PHQ9
SUM OF ALL RESPONSES TO PHQ QUESTIONS 1-9: 9
SUM OF ALL RESPONSES TO PHQ QUESTIONS 1-9: 9
10. IF YOU CHECKED OFF ANY PROBLEMS, HOW DIFFICULT HAVE THESE PROBLEMS MADE IT FOR YOU TO DO YOUR WORK, TAKE CARE OF THINGS AT HOME, OR GET ALONG WITH OTHER PEOPLE: VERY DIFFICULT

## 2019-01-09 NOTE — NURSING NOTE
"Vital signs:  Temp: 98.1  F (36.7  C) Temp src: Oral BP: 120/78 Pulse: 87   Resp: 16 SpO2: 99 %     Height: 165.7 cm (5' 5.25\") Weight: 50.8 kg (112 lb)  Estimated body mass index is 18.5 kg/m  as calculated from the following:    Height as of this encounter: 1.657 m (5' 5.25\").    Weight as of this encounter: 50.8 kg (112 lb).          "

## 2019-01-09 NOTE — PROGRESS NOTES
SUBJECTIVE:   CC: Ana Laura Wharton is an 48 year old woman who presents for preventive health visit.     Physical   Annual:     Getting at least 3 servings of Calcium per day:  Yes    Bi-annual eye exam:  Yes    Dental care twice a year:  Yes    Sleep apnea or symptoms of sleep apnea:  None    Diet:  Regular (no restrictions)    Frequency of exercise:  4-5 days/week    Duration of exercise:  30-45 minutes    Taking medications regularly:  Yes    Medication side effects:  Other    Additional concerns today:  No    PHQ-2 Total Score: 4          PROBLEMS TO ADD ON...    Has h/o HTN. on medical treatment. BP has been controlled. No side effects from medications. No CP, HA, dizziness. good compliance with medications and low salt diet.  Has history of hypothyroidism. On replacement treatment with Synthroid. No heat /cold intolerance, heart palpitations, weight loss/ gain ,  change in bowel habits.  Has h/o recurrent UTI, on suppressive treatment with Cipro, seen urology. Has had oral thrush related to antibiotics, takes PRN Diflucan.   Has h/o bipolar disorder, follows with psychiatry.   Has h/o GERD on PPI  treatment. symptoms are controlled. No nausea, vomiting, heartburns, bloating.        Today's PHQ-2 Score:  4  PHQ-2 (  [employer]) 2019   Q1: Little interest or pleasure in doing things 2   Q2: Feeling down, depressed or hopeless 2   PHQ-2 Score 4   Q1: Little interest or pleasure in doing things More than half the days   Q2: Feeling down, depressed or hopeless More than half the days   PHQ-2 Score 4       Abuse: Current or Past(Physical, Sexual or Emotional)- No  Do you feel safe in your environment? Yes    Social History     Tobacco Use     Smoking status: Current Some Day Smoker     Types: Cigarettes     Last attempt to quit: 1997     Years since quittin.0     Smokeless tobacco: Never Used   Substance Use Topics     Alcohol use: No     Alcohol/week: 1.2 oz     Types: 2 Glasses of wine per week      Comment: Quit on 09/07/18     Alcohol Use 1/9/2019   If you drink alcohol do you typically have greater than 3 drinks per day OR greater than 7 drinks per week? No       Reviewed orders with patient.  Reviewed health maintenance and updated orders accordingly - Yes  Labs reviewed in EPIC  BP Readings from Last 3 Encounters:   01/09/19 120/78   10/31/18 118/76   10/10/18 126/78    Wt Readings from Last 3 Encounters:   01/09/19 50.8 kg (112 lb)   10/31/18 52.3 kg (115 lb 4.8 oz)   10/10/18 51.7 kg (114 lb)                    Mammogram Screening: Patient over age 50, mutual decision to screen reflected in health maintenance.    Pertinent mammograms are reviewed under the imaging tab.  History of abnormal Pap smear: NO - age 30- 65 PAP every 3 years recommended     Reviewed and updated as needed this visit by clinical staff  Tobacco         Reviewed and updated as needed this visit by Provider            Review of Systems   Constitutional: Negative for chills.   HENT: Negative for congestion, ear pain, hearing loss and sore throat.    Eyes: Negative for pain and visual disturbance.   Respiratory: Negative for cough and shortness of breath.    Cardiovascular: Negative for chest pain, palpitations and peripheral edema.   Gastrointestinal: Positive for heartburn. Negative for abdominal pain, constipation, diarrhea, hematochezia and nausea.   Breasts:  Negative for tenderness, breast mass and discharge.   Genitourinary: Positive for frequency and urgency. Negative for dysuria, genital sores, hematuria, pelvic pain, vaginal bleeding and vaginal discharge.   Musculoskeletal: Positive for arthralgias and myalgias. Negative for joint swelling.   Skin: Negative for rash.   Neurological: Positive for headaches. Negative for dizziness, weakness and paresthesias.   Psychiatric/Behavioral: Positive for mood changes.     CONSTITUTIONAL: NEGATIVE for fever, chills, change in weight  INTEGUMENTARU/SKIN: NEGATIVE for worrisome rashes,  "moles or lesions  EYES: NEGATIVE for vision changes or irritation  ENT: NEGATIVE for ear, mouth and throat problems  RESP: NEGATIVE for significant cough or SOB  BREAST: NEGATIVE for masses, tenderness or discharge  CV: NEGATIVE for chest pain, palpitations or peripheral edema  GI: NEGATIVE for nausea, abdominal pain, heartburn, or change in bowel habits  : NEGATIVE for unusual urinary or vaginal symptoms. Periods are regular.  MUSCULOSKELETAL: NEGATIVE for significant arthralgias or myalgia  NEURO: NEGATIVE for weakness, dizziness or paresthesias  PSYCHIATRIC: NEGATIVE for changes in mood or affect     OBJECTIVE:   Ht 1.676 m (5' 6\")   BMI 18.61 kg/m    Physical Exam  GENERAL: healthy, alert and no distress  EYES: Eyes grossly normal to inspection, PERRL and conjunctivae and sclerae normal  HENT: ear canals and TM's normal, nose and mouth without ulcers or lesions  NECK: no adenopathy, no asymmetry, masses, or scars and thyroid normal to palpation  RESP: lungs clear to auscultation - no rales, rhonchi or wheezes  BREAST: normal without masses, tenderness or nipple discharge and no palpable axillary masses or adenopathy  CV: regular rate and rhythm, normal S1 S2, no S3 or S4, no murmur, click or rub, no peripheral edema and peripheral pulses strong  ABDOMEN: soft, nontender, no hepatosplenomegaly, no masses and bowel sounds normal   (female): normal female external genitalia, normal urethral meatus, vaginal mucosa pink, moist, well rugated, and normal cervix/adnexa/uterus without masses or discharge  MS: no gross musculoskeletal defects noted, no edema  SKIN: no suspicious lesions or rashes  NEURO: Normal strength and tone, mentation intact and speech normal  PSYCH: mentation appears normal, affect normal/bright    Diagnostic Test Results:  none     ASSESSMENT/PLAN:       ICD-10-CM    1. Encounter for preventative adult health care examination Z00.00 Hepatitis C antibody     Hepatitis B surface antigen     HIV " Antigen Antibody Combo     Treponema Abs w Reflex to RPR and Titer     Herpes Simplex Virus 1 and 2 IgG     NEISSERIA GONORRHOEA PCR     CHLAMYDIA TRACHOMATIS PCR     Lipid panel reflex to direct LDL Non-fasting     CANCELED: Lipid panel reflex to direct LDL Fasting   2. Recurrent UTI N39.0 ciprofloxacin (CIPRO) 250 MG tablet     OFFICE/OUTPT VISIT,EST,LEVL III   3. Essential hypertension I10 *MA Screening Digital Bilateral     carvedilol (COREG) 6.25 MG tablet     cloNIDine (CATAPRES) 0.1 MG tablet     OFFICE/OUTPT VISIT,EST,LEVL III   4. Acquired hypothyroidism E03.9 levothyroxine (SYNTHROID) 50 MCG tablet     TSH with free T4 reflex     OFFICE/OUTPT VISIT,EST,LEVL III   5. Encounter for screening mammogram for breast cancer Z12.31 *MA Screening Digital Bilateral   6. Gastroesophageal reflux disease without esophagitis K21.9 pantoprazole (PROTONIX) 40 MG EC tablet     OFFICE/OUTPT VISIT,EST,LEVL III   7. Hyperactivity of bladder N31.8 mirabegron (MYRBETRIQ) 25 MG 24 hr tablet   8. Oral thrush B37.0 fluconazole (DIFLUCAN) 100 MG tablet     OFFICE/OUTPT VISIT,EST,LEVL III   9. Screen for STD (sexually transmitted disease) Z11.3 Hepatitis C antibody     Hepatitis B surface antigen     HIV Antigen Antibody Combo     Treponema Abs w Reflex to RPR and Titer     Herpes Simplex Virus 1 and 2 IgG     NEISSERIA GONORRHOEA PCR     CHLAMYDIA TRACHOMATIS PCR     OFFICE/OUTPT VISIT,EST,LEVL III   10. Encounter for screening for other viral diseases  Z11.59 Hepatitis B surface antigen     Pap imaged thin layer screen with HPV - recommended age 30 - 65 years (select HPV order below)     HPV High Risk Types DNA Cervical       COUNSELING:  Reviewed preventive health counseling, as reflected in patient instructions       Regular exercise       Healthy diet/nutrition       Vision screening       Safe sex practices/STD prevention    BP Readings from Last 1 Encounters:   10/31/18 118/76     Estimated body mass index is 18.61 kg/m  as  "calculated from the following:    Height as of this encounter: 1.676 m (5' 6\").    Weight as of 10/31/18: 52.3 kg (115 lb 4.8 oz).           reports that she has been smoking cigarettes.  she has never used smokeless tobacco.  Tobacco Cessation Action Plan: Information offered: Patient not interested at this time    Counseling Resources:  ATP IV Guidelines  Pooled Cohorts Equation Calculator  Breast Cancer Risk Calculator  FRAX Risk Assessment  ICSI Preventive Guidelines  Dietary Guidelines for Americans, 2010  [employer]'s MyPlate  ASA Prophylaxis  Lung CA Screening    Liborio Lincoln MD  [facility]  Answers for HPI/ROS submitted by the patient on 1/9/2019   Annual Exam:  If you checked off any problems, how difficult have these problems made it for you to do your work, take care of things at home, or get along with other people?: Very difficult  PHQ9 TOTAL SCORE: 9    "

## 2019-01-10 ENCOUNTER — MYC MEDICAL ADVICE (OUTPATIENT)
Dept: PSYCHIATRY | Facility: CLINIC | Age: 49
End: 2019-01-10

## 2019-01-10 LAB
C TRACH DNA SPEC QL NAA+PROBE: NEGATIVE
CHOLEST SERPL-MCNC: 256 MG/DL
HBV SURFACE AG SERPL QL IA: NONREACTIVE
HCV AB SERPL QL IA: NONREACTIVE
HDLC SERPL-MCNC: 48 MG/DL
HIV 1+2 AB+HIV1 P24 AG SERPL QL IA: NONREACTIVE
HSV1 IGG SERPL QL IA: >8 AI (ref 0–0.8)
HSV2 IGG SERPL QL IA: <0.2 AI (ref 0–0.8)
LDLC SERPL CALC-MCNC: 182 MG/DL
N GONORRHOEA DNA SPEC QL NAA+PROBE: NEGATIVE
NONHDLC SERPL-MCNC: 208 MG/DL
SPECIMEN SOURCE: NORMAL
SPECIMEN SOURCE: NORMAL
T PALLIDUM AB SER QL: NONREACTIVE
TRIGL SERPL-MCNC: 128 MG/DL
TSH SERPL DL<=0.005 MIU/L-ACNC: 1.88 MU/L (ref 0.4–4)

## 2019-01-10 ASSESSMENT — PATIENT HEALTH QUESTIONNAIRE - PHQ9: SUM OF ALL RESPONSES TO PHQ QUESTIONS 1-9: 9

## 2019-01-11 NOTE — TELEPHONE ENCOUNTER
Jaden Rodriguez MD Pratt, Laura, KISHA   Phone Number: [phone]             Jan 28 at 2:30      Writer notified pt of the suggested day/time. Will confirm whether she's available or not.

## 2019-01-14 ENCOUNTER — MYC MEDICAL ADVICE (OUTPATIENT)
Dept: PSYCHIATRY | Facility: CLINIC | Age: 49
End: 2019-01-14

## 2019-01-14 DIAGNOSIS — F31.9 BIPOLAR I DISORDER (H): ICD-10-CM

## 2019-01-14 LAB
COPATH REPORT: ABNORMAL
PAP: ABNORMAL

## 2019-01-14 RX ORDER — BUSPIRONE HYDROCHLORIDE 15 MG/1
TABLET ORAL
Qty: 180 TABLET | Refills: 0 | Status: SHIPPED | OUTPATIENT
Start: 2019-01-14 | End: 2019-02-11

## 2019-01-15 ENCOUNTER — MYC MEDICAL ADVICE (OUTPATIENT)
Dept: INTERNAL MEDICINE | Facility: CLINIC | Age: 49
End: 2019-01-15

## 2019-01-15 LAB
FINAL DIAGNOSIS: ABNORMAL
HPV HR 12 DNA CVX QL NAA+PROBE: POSITIVE
HPV16 DNA SPEC QL NAA+PROBE: NEGATIVE
HPV18 DNA SPEC QL NAA+PROBE: NEGATIVE
SPECIMEN DESCRIPTION: ABNORMAL
SPECIMEN SOURCE CVX/VAG CYTO: ABNORMAL

## 2019-01-16 PROBLEM — R87.611 ATYPICAL SQUAMOUS CELLS CANNOT EXCLUDE HIGH GRADE SQUAMOUS INTRAEPITHELIAL LESION ON CYTOLOGIC SMEAR OF CERVIX (ASC-H): Status: ACTIVE | Noted: 2019-01-09

## 2019-01-17 ENCOUNTER — MYC MEDICAL ADVICE (OUTPATIENT)
Dept: INTERNAL MEDICINE | Facility: CLINIC | Age: 49
End: 2019-01-17

## 2019-01-17 DIAGNOSIS — E78.5 HYPERLIPIDEMIA LDL GOAL <130: Primary | ICD-10-CM

## 2019-01-17 NOTE — TELEPHONE ENCOUNTER
Please call in cholesterol medication.       1/16/19  Liborio Lincoln MD   to Ana Laura Wharton   Hi,   I would recommend medical treatment for high cholesterol in addition to diet and exercise.   I will call in a prescription. Let's repeat the cholesterol test in 6 months on treatment.

## 2019-01-18 ENCOUNTER — MYC MEDICAL ADVICE (OUTPATIENT)
Dept: PSYCHIATRY | Facility: CLINIC | Age: 49
End: 2019-01-18

## 2019-01-18 NOTE — TELEPHONE ENCOUNTER
Writer called [facility] pharmacy at the number listed in the [employer] message. Spoke with pharmacist, Arelis and informed her that provider would still like to fill medication. Notified pt via [employer] that issue should now be resolved.

## 2019-01-22 RX ORDER — ATORVASTATIN CALCIUM 10 MG/1
10 TABLET, FILM COATED ORAL DAILY
Qty: 90 TABLET | Refills: 3 | Status: SHIPPED | OUTPATIENT
Start: 2019-01-22 | End: 2019-10-23

## 2019-01-23 ENCOUNTER — OFFICE VISIT (OUTPATIENT)
Dept: OBGYN | Facility: CLINIC | Age: 49
End: 2019-01-23
Payer: COMMERCIAL

## 2019-01-23 ENCOUNTER — TELEPHONE (OUTPATIENT)
Dept: OBGYN | Facility: CLINIC | Age: 49
End: 2019-01-23

## 2019-01-23 VITALS — DIASTOLIC BLOOD PRESSURE: 68 MMHG | BODY MASS INDEX: 18.36 KG/M2 | SYSTOLIC BLOOD PRESSURE: 106 MMHG | WEIGHT: 111.2 LBS

## 2019-01-23 DIAGNOSIS — Z98.890 STATUS POST COLPOSCOPY: Primary | ICD-10-CM

## 2019-01-23 DIAGNOSIS — R87.611 ATYPICAL SQUAMOUS CELLS CANNOT EXCLUDE HIGH GRADE SQUAMOUS INTRAEPITHELIAL LESION ON CYTOLOGIC SMEAR OF CERVIX (ASC-H): Primary | ICD-10-CM

## 2019-01-23 PROCEDURE — 88305 TISSUE EXAM BY PATHOLOGIST: CPT | Mod: 26 | Performed by: OBSTETRICS & GYNECOLOGY

## 2019-01-23 PROCEDURE — 57454 BX/CURETT OF CERVIX W/SCOPE: CPT | Performed by: OBSTETRICS & GYNECOLOGY

## 2019-01-23 PROCEDURE — 88305 TISSUE EXAM BY PATHOLOGIST: CPT | Performed by: OBSTETRICS & GYNECOLOGY

## 2019-01-23 NOTE — NURSING NOTE
"Chief Complaint   Patient presents with     Colposcopy       Initial /68   Wt 50.4 kg (111 lb 3.2 oz)   BMI 18.36 kg/m   Estimated body mass index is 18.36 kg/m  as calculated from the following:    Height as of 1/9/19: 1.657 m (5' 5.25\").    Weight as of this encounter: 50.4 kg (111 lb 3.2 oz).  BP completed using cuff size: regular    Questioned patient about current smoking habits.  Pt. currently smokes.  Advised about smoking cessation.      No obstetric history on file.    The following HM Due: NONE      Shantelle Luke CMA               "

## 2019-01-23 NOTE — TELEPHONE ENCOUNTER
Pt notified. Advised her to continue with ibuprofen and tylenol and to let us know if the pain does not start to improve in 1-2 days.        Hattie De Leon RN

## 2019-01-23 NOTE — TELEPHONE ENCOUNTER
Pt calling. She was seen today for colposcopy. She says she took 800mg of ibuprofen an hour before her appt, but she is still in a lot of pain. She asks if Dr Mcwilliams can send a stronger rx to the [facility] in [place] ([address]). Told her I would ask, but that she could take some Tylenol in the meantime.        Hattie De Leon RN

## 2019-01-23 NOTE — PROGRESS NOTES
48 year old P1 presents for colposcopy.      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    Prior Colposcopy history: No  Prior LEEP:No  No LMP recorded. Patient has had an implant.    Tobacco: Yes   Gardasil vaccination status:No    Discussed nature of HPV related infection, natural history and association with cervical dysplasia.  Procedure for colposcopy and biopsy was explained to the patient and consent obtained.  All the patient's questions were answered.    PROCEDURE:  COLPOSCOPY  After a procedural timeout was taken, she was positioned in dorsal lithotomy and a speculum was inserted to allow visualization of the cervix. A 5% acetic acid solution was applied to the ectocervix with large swabs. Lugols solution was also applied.  Colposcopic examination was then undertaken of the cervix, distal vaginal canal and vaginal fornices.    FINDINGS:Physical Exam   Genitourinary:           Procedures      Plan: Specimens labelled and sent to pathology., Will base further treatment on pathology findings. and post biopsy instructions given to patient.      Celso Mcwilliams MD

## 2019-01-24 ENCOUNTER — NURSE TRIAGE (OUTPATIENT)
Dept: NURSING | Facility: CLINIC | Age: 49
End: 2019-01-24

## 2019-01-24 NOTE — TELEPHONE ENCOUNTER
"Patient calling reporting continues to have abdominal cramps.   \" Advised her to continue with ibuprofen and tylenol and to let us know if the pain does not start to improve in 1-2 days\" Per provider recommendation. Patient states she will call clinic in the morning if continues to have the abdominal cramps.     Bennie Woods RN  [facility]        "

## 2019-01-24 NOTE — TELEPHONE ENCOUNTER
Spoke with pt, she said it has improved since using heating pad.  Will try naproxen.  She is comfortable with this plan.    Echo GUERRIER R.N.  [facility]

## 2019-01-24 NOTE — TELEPHONE ENCOUNTER
Pt calls today stating she is having a high amount of period like cramping.  She rates her pain at 7/10.  She has no bleeding.  Has been taking tylenol (500mg) and ibuprofen (800mg) alternating.     I told pt this is not typical for after a colposcopy so she may need to be seen in clinic or ED if pain is that bad despite use of OTC meds.  She said she has no one to drive her and doesn't think anything is wrong, but just has pain.  No foul odor or abnml discharge.     I advised to use a heating pad and continue alternating tylenol and ibuprofen.  I told her I would forward on to Dr Mcwilliams and let her know if he wants to see her.      Hx of opioid dependence noted.      Echo GUERRIER R.N.  [facility]

## 2019-01-25 LAB — COPATH REPORT: NORMAL

## 2019-02-01 NOTE — PROGRESS NOTES
Service Date: 2019      PSYCHIATRY CLINIC PROGRESS NOTE      The patient returns for medication management and supportive therapy.      Interim History   The patient is now . Her father , and she spent Ramiro alone, crying and grieveing. Today is the 125th day or her sobriety. She saw her son for the first time in two almost two years. She has supportive friends. Some days she can't get out of bed.      RECENT SYMPTOMS   Depression:   The patient endorses situational anxiety, anhedonia, low energy, decreased appetite, and excessive guilt.       Elevated mood:   The patient denies symptoms of linda.      Psychosis:   The patient denies symptoms of psychosis/      Panic Attacks:   The patient denies panic attacks.      Anxiety:   The patient endorses excessive worries and nervousness.      Trauma-related:   The patient denies symptoms related to trauma.      ADVERSE EFFECTS:   1. Dry mouth      MEDICAL CONCERNS:   1. C. Diff      SUBSTANCE USE:   The patient is sober from alcohol but does endorse tobacco use.      SOCIAL/FAMILY HISTORY:   See interim history. Otherwise unchanged.      MEDICAL/SURGICAL HISTORY:   See EMR medical problems list.      MEDICAL REVIEW OF SYSTEMS:   A comprehensive review of systems was performed and is negative other than as noted in the HPI.      ALLERGY:   None new.      CURRENT MEDICATIONS:   See EMR med sections.      VITALS:   See rooming note.      MENTAL STATUS EXAM:   On mental status examination, the patient was alert, well groomed, and cooperative with the interview. Speech was normal rate and flow. Movements, gait, and station were normal. Mood was reactive with a congruent affect. Thought processes were logical without suicidal ideation or psychosis. Insight and judgment were good. Patient was oriented x4 with adequate attention and concentration, intact memory, and good fund of knowledge.      Labs and Data:         DIAGNOSIS AND ASSESSMENT:   1. Bipolar  disorder with situational sadness.      PLAN:   1. Medications: No changes   2. Therapy:   3. RTC in one month   4. Other:   5. Crisis numbers: provided routinely in AVS.      Treatment Risk Statement: The patient understands the risks, benefits, adverse effects, and alternatives. Agrees to treatment with the capacity to do so. No medical contraindications to treatment. Agrees to call clinic for any problems. The patient understands to call 911 or come to the nearest ED is life-threatening or urgent symptoms present.         PEIDAD SADLER MD             D: 2019   T: 2019   MT: PANKAJ      Name:     KEE TINEO   MRN:      [id number]        Account:      [id number]   :      1970           Service Date: 2019      Document: [id number]

## 2019-02-11 ENCOUNTER — TELEPHONE (OUTPATIENT)
Dept: PSYCHIATRY | Facility: CLINIC | Age: 49
End: 2019-02-11

## 2019-02-11 DIAGNOSIS — F31.9 BIPOLAR I DISORDER (H): ICD-10-CM

## 2019-02-11 RX ORDER — MIRTAZAPINE 30 MG/1
60 TABLET, FILM COATED ORAL AT BEDTIME
Qty: 60 TABLET | Refills: 1 | Status: SHIPPED | OUTPATIENT
Start: 2019-02-11 | End: 2019-02-18

## 2019-02-11 RX ORDER — BUSPIRONE HYDROCHLORIDE 15 MG/1
22.5 TABLET ORAL 2 TIMES DAILY
Qty: 180 TABLET | Refills: 0 | Status: SHIPPED | OUTPATIENT
Start: 2019-02-11 | End: 2019-02-18

## 2019-02-11 RX ORDER — CLONAZEPAM 1 MG/1
TABLET ORAL
Qty: 1 TABLET
Start: 2019-02-11 | End: 2019-02-18

## 2019-02-11 NOTE — TELEPHONE ENCOUNTER
"Writer received incoming phone call from pt at [phone]. Pt tearful throughout the phone call.     The pt reports her anxiety and depression has become overwhelming and is requesting \"support, guidance, and possibly a med change.\"     She went onto explain that she has been using Buspar and CBD oil consistently. The pt feels it is somewhat effective, but not quite enough. Due to the increased anxiety, the pt has been taking 3 mg of Klonopin daily vs 2 mg as prescribed. The pt will take 1 mg of Klonopin every morning and 2 mg at bedtime. She will also take hydroxyzine 100 mg every morning. The pt feels her anxiety is usually worse in the morning, but recently, she feels the anxiety is consistently high throughout the day. She wonders if the Buspar could be increased, as she does feel it's helpful.     The pt has also reported feeling very depressed. She has passive SI. When asked about a plan or intent, she denied this. The pt has her significant other with her at home throughout the day, most days to keep her safe. He will be with her throughout the day today.     Writer agreed to reach out to the provider. Asked that she c/b if her anxiety continues to worsen or she does not hear from the provider. Pt agreed to do so. Inquired about an earlier appt. Current appt is 2/18. Pt resistant to come in this week due to the weather.   "

## 2019-02-11 NOTE — TELEPHONE ENCOUNTER
Writer called pt to check on safety. Pt still reports she can keep herself safe. She's currently with her significant other. If her anxiety or depression becomes unmanageable, her plan is to come into the [facility] ED. Agreed to page the provider.

## 2019-02-11 NOTE — TELEPHONE ENCOUNTER
Jaden Rodriguez MD Pratt, Laura, RN   Caller: Unspecified (Today,  9:34 AM)             I spoke with Ana Laura.     Appears provider also made med changes. No further action needed by writer at this time.

## 2019-02-13 ENCOUNTER — TELEPHONE (OUTPATIENT)
Dept: INTERNAL MEDICINE | Facility: CLINIC | Age: 49
End: 2019-02-13

## 2019-02-13 NOTE — TELEPHONE ENCOUNTER
Patient is calling, states there will be a PA coming thru from [employer]. Myrbetriq is not being covered very well, needs a new tier authorization stating that other meds have been tried and have not worked. This med is currently working.     Best call back is [phone], ok to leave a message.

## 2019-02-18 ENCOUNTER — TELEPHONE (OUTPATIENT)
Dept: INTERNAL MEDICINE | Facility: CLINIC | Age: 49
End: 2019-02-18

## 2019-02-18 ENCOUNTER — OFFICE VISIT (OUTPATIENT)
Dept: PSYCHIATRY | Facility: CLINIC | Age: 49
End: 2019-02-18
Attending: PSYCHIATRY & NEUROLOGY
Payer: COMMERCIAL

## 2019-02-18 ENCOUNTER — TELEPHONE (OUTPATIENT)
Dept: PSYCHIATRY | Facility: CLINIC | Age: 49
End: 2019-02-18

## 2019-02-18 VITALS
BODY MASS INDEX: 18.13 KG/M2 | SYSTOLIC BLOOD PRESSURE: 144 MMHG | DIASTOLIC BLOOD PRESSURE: 92 MMHG | HEART RATE: 101 BPM | WEIGHT: 109.8 LBS

## 2019-02-18 DIAGNOSIS — F31.9 BIPOLAR I DISORDER (H): ICD-10-CM

## 2019-02-18 PROCEDURE — G0463 HOSPITAL OUTPT CLINIC VISIT: HCPCS | Mod: ZF

## 2019-02-18 RX ORDER — CLONAZEPAM 1 MG/1
TABLET ORAL
Qty: 120 TABLET | Refills: 1 | Status: SHIPPED | OUTPATIENT
Start: 2019-02-18 | End: 2019-04-22

## 2019-02-18 RX ORDER — CITALOPRAM HYDROBROMIDE 20 MG/1
10 TABLET ORAL DAILY
Qty: 15 TABLET | Refills: 1 | Status: SHIPPED | OUTPATIENT
Start: 2019-02-18 | End: 2019-03-18

## 2019-02-18 RX ORDER — MIRTAZAPINE 30 MG/1
60 TABLET, FILM COATED ORAL AT BEDTIME
Qty: 60 TABLET | Refills: 1 | Status: SHIPPED | OUTPATIENT
Start: 2019-02-18 | End: 2019-03-01

## 2019-02-18 ASSESSMENT — PAIN SCALES - GENERAL: PAINLEVEL: SEVERE PAIN (7)

## 2019-02-18 NOTE — TELEPHONE ENCOUNTER
Patient calls stating that she called insurance and was advised we had to initiate the PA.  PA [place] and [place] [facility] [place].

## 2019-02-18 NOTE — TELEPHONE ENCOUNTER
Prior Authorization Retail Medication Request    Medication/Dose: mirtazapine (REMERON) 30 MG tablet  ICD code (if different than what is on RX):  F31.9 (Bipolar I Disorder)  Previously Tried and Failed:  Mirtazapine 45 mg   Rationale: This pt is bipolar and all other antidepressants maker her manic. 45 mg is not enough for her depression control. Please consider approving a qty of 60 (2 tabs per day) to avoid decompensation and hospitalization of this pt.    Insurance Name:  [facility] MEDICARE ADVANTAGE  Insurance ID:  [id number]       Pharmacy Information (if different than what is on RX)  Name:    Phone:

## 2019-02-18 NOTE — TELEPHONE ENCOUNTER
Prior Authorization Retail Medication Request    Medication/Dose: Mirtazapine  ICD code (if different than what is on RX):  Bipolar I disorder (H) [F31.9]   Previously Tried and Failed:  unknown  Rationale:  unknown    Insurance Name:  unknown  Insurance ID:  unknown      Pharmacy Information (if different than what is on RX)  Name:  [facility]  Phone:

## 2019-02-19 NOTE — TELEPHONE ENCOUNTER
Prior Authorization Approval    Authorization Effective Date: 2/19/2019  Authorization Expiration Date: 2/19/2020  Medication: mirtazapine - APPROVED  Approved Dose/Quantity:   Reference #:     Insurance Company: [employer] - Phone [phone] Fax [phone]  Expected CoPay: n/a     CoPay Card Available:      Foundation Assistance Needed:    Which Pharmacy is filling the prescription (Not needed for infusion/clinic administered): [employer] [facility] - [facility] [address]  Pharmacy Notified: Yes  Patient Notified: Yes

## 2019-02-19 NOTE — TELEPHONE ENCOUNTER
Central Prior Authorization Team   Phone: [phone]    PA Initiation    Medication: mirtazapine   Insurance Company: [employer] - Phone [phone] Fax [phone]  Pharmacy Filling the Rx: [employer] [facility] - [facility] [address]  Filling Pharmacy Phone: [phone]  Filling Pharmacy Fax:    Start Date: 2/18/2019

## 2019-02-21 ENCOUNTER — TELEPHONE (OUTPATIENT)
Dept: INTERNAL MEDICINE | Facility: CLINIC | Age: 49
End: 2019-02-21

## 2019-02-21 NOTE — TELEPHONE ENCOUNTER
Reason for Call:  Other call back    Detailed comments: nausea head ache on 2 new rx lipitor and some other med for her bladder    Phone Number Patient can be reached at: Cell number on file:    Telephone Information:   Mobile [phone]       Best Time: anytime    Can we leave a detailed message on this number? YES    Call taken on 2/21/2019 at 1:11 PM by Cristiane Morton

## 2019-02-21 NOTE — TELEPHONE ENCOUNTER
Call to patient, patient states that she started taking Myrbetriq and Atorvastatin which has been giving her headaches and nausea. Patient states that the nausea is extremely bad and the headache is constant. Patient reports using Zofran for migraines and maalox for nausea. Please advise as to what patient is to do and what she can possibly take instead of these medications.     Thank you.

## 2019-02-22 NOTE — TELEPHONE ENCOUNTER
She can hold the Atorvastatin and see if symptoms will resolved, to see what medication may be causing her symptoms.

## 2019-03-01 ENCOUNTER — OFFICE VISIT (OUTPATIENT)
Dept: PSYCHIATRY | Facility: CLINIC | Age: 49
End: 2019-03-01
Attending: PSYCHIATRY & NEUROLOGY
Payer: COMMERCIAL

## 2019-03-01 DIAGNOSIS — F31.9 BIPOLAR I DISORDER (H): ICD-10-CM

## 2019-03-01 RX ORDER — MIRTAZAPINE 30 MG/1
60 TABLET, FILM COATED ORAL AT BEDTIME
Qty: 180 TABLET | Refills: 0 | Status: SHIPPED | OUTPATIENT
Start: 2019-03-01 | End: 2019-05-06

## 2019-03-01 RX ORDER — CYCLOBENZAPRINE HCL 10 MG
10 TABLET ORAL AT BEDTIME
Qty: 90 TABLET | Refills: 0 | Status: ON HOLD | OUTPATIENT
Start: 2019-03-01 | End: 2019-05-29

## 2019-03-01 ASSESSMENT — PATIENT HEALTH QUESTIONNAIRE - PHQ9: SUM OF ALL RESPONSES TO PHQ QUESTIONS 1-9: 13

## 2019-03-06 NOTE — TELEPHONE ENCOUNTER
Call to pt. She states she figured out it was from something else and when she removed that, her symptoms went away.   It was not from her Atorvastatin.     She is feeling better.

## 2019-03-11 ENCOUNTER — MYC MEDICAL ADVICE (OUTPATIENT)
Dept: PSYCHIATRY | Facility: CLINIC | Age: 49
End: 2019-03-11

## 2019-03-11 ENCOUNTER — TELEPHONE (OUTPATIENT)
Dept: INTERNAL MEDICINE | Facility: CLINIC | Age: 49
End: 2019-03-11

## 2019-03-11 ENCOUNTER — TELEPHONE (OUTPATIENT)
Dept: PSYCHIATRY | Facility: CLINIC | Age: 49
End: 2019-03-11

## 2019-03-11 DIAGNOSIS — N31.8 HYPERACTIVITY OF BLADDER: ICD-10-CM

## 2019-03-11 NOTE — TELEPHONE ENCOUNTER
Reason for Call:  Medication or medication refill: Myrebetriq    Do you use a [facility]?  Name of the pharmacy and phone number for the current request:  [facility]  Name of the medication requested: Myrebetriq    Other request: Pt needs [facility] to contact [employer] before they can fill this med. Pt would like an authorization (tier acception) from [employer].Pt request a call back when this is in process.     Can we leave a detailed message on this number? YES    Phone number patient can be reached at: Home number on file [phone] (home)    Best Time: Any    Call taken on 3/11/2019 at 2:48 PM by Madonna Sanders

## 2019-03-11 NOTE — TELEPHONE ENCOUNTER
Writer received incoming phone call from pt at [phone]. Pt reports [facility] will not fill her mirtazapine due to a continual insurance issue. Reviewed pt's chart and it appears a PA was approved for mirtazapine on 2/19/19. Agreed to call [facility] to gather more information.     Called [facility] at [phone]. Spoke with pharmacist, Yomaira who said there is not an insurance issue. Instead, the pharmacist wanted to confirm if it was safe to dispense 60 mg daily, as the 45 mg is typically the highest dose. Provided confirmation on behalf of Dr. Rodriguez. Yomaira took orders and processed this through insurance. It does require a co-pay of $6.00, otherwise everything went through just fine. They will complete the fill/process ASAP, as it is marked as high priority.    Called the pt and informed her that everything is good to go. Informed her it was not an insurance issue and instead the pharmacist only wanted to confirm the daily dose. Also informed pt that it's a $6.00 co-pay. She voiced understanding and appreciation. No further action needed.

## 2019-03-12 NOTE — TELEPHONE ENCOUNTER
Prior Authorization Retail Medication Request    Medication/Dose: Myrbetriq ER 25MG Tablet  ICD code (if different than what is on RX):  Hyperactivity of bladder [N31.8]  Previously Tried and Failed:  Unknown.  Patient states all other medications except for this one have failed.  Rationale:  unknown    Insurance Name:  [employer]  Insurance ID:  [id number]      Pharmacy Information (if different than what is on RX)  Name:  [facility]  Phone:  [phone]

## 2019-03-12 NOTE — TELEPHONE ENCOUNTER
She needs to find what other medications were tried. If this was prescribe by urology, should discuss with them.

## 2019-03-12 NOTE — TELEPHONE ENCOUNTER
Patient came into clinic with forms from [facility] on her denial for Myrvetriq.  Took forms from patient and advised that we would initiate the Prior Auth with our Prior Authorization Department.

## 2019-03-13 NOTE — TELEPHONE ENCOUNTER
Looks like she tried Detrol in the past.     She used to go to [facility] in [facility], but doesn't appear that she is going to [facility] anymore.     Urology didn't prescribe medication, besides Cipro.   Also prescribed Amitriptyline for Cystitis.     Please advise if should try for PA?

## 2019-03-13 NOTE — PROGRESS NOTES
"Service Date: 2019      PSYCHIATRY CLINIC PROGRESS NOTE      The patient returns for medication management and supportive therapy.      Interim History   Ana Laura reports today very distraught, tearful, and frustrated with having bipolar illness. She has no money. Her father , and she is going through a divorce. She has people staying with her for her safety. She says she feels \"worn out.\" She is mostly depressed, but with a bit of linda mixed into it, and she says, \"I've lost everything.\"      RECENT SYMPTOMS   Depression:   The patient endorses frequent suicidal ideation, depressed mood, anhedonia, low energy (especially after taking Buspar), and not being able to sleep secondary to nausea and nightmares.      Elevated mood:   The patient denies symptoms of linda.      Psychosis: None        Panic Attacks:   The patient endorses frequent panic attacks.      Anxiety:   The patient endorses very high anxiety, excessive worries, feelings of fearfulness, nervousness, and tension, and being overwhelmed.       Trauma-related:   The patient denies symptoms related to trauma.      ADVERSE EFFECTS:   The patient is not aware of any.      MEDICAL CONCERNS:   1. Chronic diarrhea      SUBSTANCE USE:   The patient reports being sober from all substances of abuse.      SOCIAL/FAMILY HISTORY:   Unchanged since the last visit.      MEDICAL/SURGICAL HISTORY:   See EMR medical problems list.      MEDICAL REVIEW OF SYSTEMS:   A comprehensive review of systems was performed and is negative other than as noted in the HPI.      ALLERGY:   None new.      CURRENT MEDICATIONS:   See EMR med sections.      VITALS:   See rooming note.      MENTAL STATUS EXAM:   On mental status examination, the patient was alert, well groomed, and cooperative with the interview. Speech was normal rate and flow. Movements, gait, and station were normal. Mood was sad and affect was congruent. Thought processes were logical, with frequent passive " suicidal ideation but no psychosis. Insight and judgment were adequate. Patient was oriented x4 with adequate attention and concentration, intact memory, and good fund of knowledge.       Labs and Data:         DIAGNOSIS AND ASSESSMENT:   Bipolar I, disorder, currently depressed.      PLAN:   1. Medications:   a. Discontinue Buspar   b. Celexa 10 mg   2. Therapy:   3. RTC in 2-3 weeks.    4. Other:   5. Crisis numbers: provided routinely in AVS.      Treatment Risk Statement: The patient understands the risks, benefits, adverse effects, and alternatives. Agrees to treatment with the capacity to do so. No medical contraindications to treatment. Agrees to call clinic for any problems. The patient understands to call 911 or come to the nearest ED is life-threatening or urgent symptoms present.         PIEDAD SADLER MD             D: 2019   T: 2019   MT: PANKAJ      Name:     KEE TINEO   MRN:      [id number]        Account:      [id number]   :      1970           Service Date: 2019      Document: [id number]

## 2019-03-14 NOTE — TELEPHONE ENCOUNTER
Central Prior Authorization Team   Phone: [phone]      PA NOT NEEDED    Medication: Myrbetriq ER 25MG Tablet-PA NOT NEEDED  Insurance Company: [employer] - Phone [phone] Fax [phone]  Pharmacy Filling the Rx: [facility] - [facility] [address]  Filling Pharmacy Phone: [phone]  Filling Pharmacy Fax:    Start Date: 3/14/2019    Called and spoke with Jer at [facility] and the medication is showing as refill too soon.  Called and spoke with pharmacy, they do not need a PA they are needing refills of the medication.  Please send a new prescription to pharmacy.

## 2019-03-15 ENCOUNTER — TELEPHONE (OUTPATIENT)
Dept: PSYCHIATRY | Facility: CLINIC | Age: 49
End: 2019-03-15

## 2019-03-15 RX ORDER — MIRABEGRON 25 MG/1
25 TABLET, FILM COATED, EXTENDED RELEASE ORAL DAILY
Qty: 90 TABLET | Refills: 1 | Status: SHIPPED | OUTPATIENT
Start: 2019-03-15 | End: 2019-10-30

## 2019-03-15 NOTE — TELEPHONE ENCOUNTER
"Writer received incoming call from patient [phone]. Patient at this time is experiencing high anxiety when waking up. Patient reports \" I went through a divorce, my dad passed away and my mom is not doing well.\" Patient reports having a bad encounter with someone which has caused her to get depressed and anxious. She was tearful on the phone and started \" I have been crying since last night and work up crying.\" Denies having a nightmare or a bad dream causing this reaction. Patient reports good support system around. She went on by saying \" my SO is very supportive and I have many people around me who love me very much.\" She also reports taking meds as prescribed including PRN. Patient took Klonopin 1 mg in AM as a PRN dose. \" Please share with provider I have been doing well since the past week and medication changes are working for me but right now I am just having a hard time.\" When asked regarding safety concerns patient denied \" I would never do that to my self but I am just very overwhelmed right now.\"  She will be home all day surrounded by family and reports feeling safe. Denied passive or active SI. Writer and patient agreed she would take a shower and go out for a walk to help manage anxiety. Writer agreed to call back within an hour for an update.    Routed to provider   "

## 2019-03-15 NOTE — TELEPHONE ENCOUNTER
Clinic Care Coordination - Follow-up (anxiety and depressive symptoms )      Jaden Rodriguez MD   You 9 minutes ago (11:45 AM)      OK to take extra 1 mg Klonopin twice daily for the next week.  Alternatively, could take Zyprexa 5 mg extra each day.    Routing Comment      Writer placed a call to patient at [phone] for an update on anxiety and depressive symptoms. Patient at this time reports improvement in anxiety and depressive symptoms. Currently was taking a nap. She also shared completing house tasks and going for a walk which helped decrease anxiety significantly. Writer relayed provider recommendation to take an extra Klonopin 1 mg BID for the next week or take extra 5 mg of Zyprexa. Patient agreed to take either of these options if anxiety seem to worsen. Patient also provided with on-call resident number for further questions or concerns over the weekend. Continued to deny safety concerns at this time.     Patient scheduled to be seen by provider on 3/18/19.     No further action needed by this writer

## 2019-03-18 ENCOUNTER — OFFICE VISIT (OUTPATIENT)
Dept: PSYCHIATRY | Facility: CLINIC | Age: 49
End: 2019-03-18
Attending: PSYCHIATRY & NEUROLOGY
Payer: COMMERCIAL

## 2019-03-18 VITALS
DIASTOLIC BLOOD PRESSURE: 93 MMHG | SYSTOLIC BLOOD PRESSURE: 134 MMHG | WEIGHT: 110 LBS | BODY MASS INDEX: 18.16 KG/M2 | HEART RATE: 89 BPM

## 2019-03-18 DIAGNOSIS — F31.9 BIPOLAR I DISORDER (H): ICD-10-CM

## 2019-03-18 PROCEDURE — G0463 HOSPITAL OUTPT CLINIC VISIT: HCPCS | Mod: ZF

## 2019-03-18 RX ORDER — OLANZAPINE 10 MG/1
10 TABLET ORAL 2 TIMES DAILY
Qty: 180 TABLET | Refills: 0 | Status: SHIPPED | OUTPATIENT
Start: 2019-03-18 | End: 2019-04-01

## 2019-03-18 RX ORDER — CITALOPRAM HYDROBROMIDE 20 MG/1
10 TABLET ORAL DAILY
Qty: 15 TABLET | Refills: 2 | Status: SHIPPED | OUTPATIENT
Start: 2019-03-18 | End: 2019-05-06

## 2019-03-18 ASSESSMENT — PAIN SCALES - GENERAL: PAINLEVEL: NO PAIN (0)

## 2019-03-20 NOTE — PROGRESS NOTES
Medication Management  3/1/19    EMR reviewed, met with Ana Laura.  She remains very stressed, anxious, and dysphoric.  Hard to function day to day.  Losing weight.  No appetite and chronic diarrhea.  Passive SI.  Some support from friends.  Doesn't like living alone.  Remeron at 60 mg not helping.  Doesn't get out much.  The only thing she enjoys is getting out with friends.  Insomnia.  No energy or motivation.  Denies side effects or medical problems other than the colitis.  Meds: See EMR  Vitals:  See rooming note  ROS: complete ROS done and was found to be negative other than what was noted earlier.  MSE:  Well groomed and attired.  Good eye contact.  Mood sad.  Tearful.  Movements nl. Thought process logical; no FOI or JENNIFER.  Speech nl rate and flow without pressure.  Passive SI.  No psychosis.  Oriented x4.  Attention OK.  Recent and remote recall good.  I&J adequate.  Assessment and Plan:  Bipolar 1, depressed.  Severe situational stresses.  She has not tolerated mood stabilizers in the past.  Antidepressants other than Remeron have made her manic.  Needs to meet with her therapist more frequently.  F/U 2 weeks.

## 2019-03-25 ENCOUNTER — TELEPHONE (OUTPATIENT)
Dept: INTERNAL MEDICINE | Facility: CLINIC | Age: 49
End: 2019-03-25

## 2019-03-25 DIAGNOSIS — N32.81 OVERACTIVE BLADDER: Primary | ICD-10-CM

## 2019-03-25 NOTE — TELEPHONE ENCOUNTER
Call to patient to clarify pharmacy. Advised primary care provider is out of office. Patient requests this be sent to PCP to address when he returns.

## 2019-03-25 NOTE — TELEPHONE ENCOUNTER
Reason for Call:  Other prescription    Detailed comments: Pt needs another overactive bladder medication as the first 1 did not work and the 2nd 1 was not covered by ins.  Ins gave her names of meds that are covered: oxybutin chloride both ER and regular or toviaz ER.    Phone Number Patient can be reached at: Home number on file [phone] (home)    Best Time: any    Can we leave a detailed message on this number? YES    Call taken on 3/25/2019 at 9:48 AM by Karla Rodriguez

## 2019-03-31 RX ORDER — OXYBUTYNIN CHLORIDE 5 MG/1
5 TABLET, EXTENDED RELEASE ORAL DAILY
Qty: 90 TABLET | Refills: 3 | Status: SHIPPED | OUTPATIENT
Start: 2019-03-31 | End: 2019-10-30

## 2019-04-01 ENCOUNTER — OFFICE VISIT (OUTPATIENT)
Dept: PSYCHIATRY | Facility: CLINIC | Age: 49
End: 2019-04-01
Attending: PSYCHIATRY & NEUROLOGY
Payer: COMMERCIAL

## 2019-04-01 VITALS
SYSTOLIC BLOOD PRESSURE: 135 MMHG | BODY MASS INDEX: 18.63 KG/M2 | DIASTOLIC BLOOD PRESSURE: 90 MMHG | WEIGHT: 112.8 LBS | HEART RATE: 83 BPM

## 2019-04-01 DIAGNOSIS — F31.9 BIPOLAR I DISORDER (H): ICD-10-CM

## 2019-04-01 PROCEDURE — G0463 HOSPITAL OUTPT CLINIC VISIT: HCPCS | Mod: ZF

## 2019-04-01 RX ORDER — OLANZAPINE 10 MG/1
10 TABLET ORAL 2 TIMES DAILY
Qty: 180 TABLET | Refills: 0 | Status: SHIPPED | OUTPATIENT
Start: 2019-04-01 | End: 2019-04-11

## 2019-04-01 RX ORDER — HYDROXYZINE PAMOATE 50 MG/1
50 CAPSULE ORAL 4 TIMES DAILY PRN
Qty: 360 CAPSULE | Refills: 1 | Status: SHIPPED | OUTPATIENT
Start: 2019-04-01 | End: 2019-05-06

## 2019-04-01 ASSESSMENT — PAIN SCALES - GENERAL: PAINLEVEL: NO PAIN (0)

## 2019-04-08 ENCOUNTER — MYC MEDICAL ADVICE (OUTPATIENT)
Dept: PSYCHIATRY | Facility: CLINIC | Age: 49
End: 2019-04-08

## 2019-04-08 DIAGNOSIS — F31.9 BIPOLAR I DISORDER (H): ICD-10-CM

## 2019-04-08 NOTE — TELEPHONE ENCOUNTER
Writer received the following information from the provider about the increased dose:    Jaden Rodriguez MD Pratt, Laura, RN             She is still having mixed bipolar symptoms and cannot tolerate any of the mood stabilizers.

## 2019-04-08 NOTE — TELEPHONE ENCOUNTER
"Called pt and provided update, as she has not read the [employer] message. Agreed to complete PA over the phone with [employer] and will then call pt back with an update    Called [facility] PA department at [phone]. Per representative, one 20 mg tab per day would be covered.     Called pt back. Pt very tearful on the phone. She reports feeling \"very sad\" and like she's \"bottoming out.\" Writer inquired about her safety. She reports she's safe and requested that writer relay to Dr. Rodriguez that she's not doing very well. Writer agreed to do so.     Regarding the Zyprexa, pt agreed to split 20 mg tabs. Will discuss with provider first before ordering.   "

## 2019-04-11 RX ORDER — OLANZAPINE 20 MG/1
TABLET ORAL
Qty: 90 TABLET | Refills: 0 | Status: ON HOLD | OUTPATIENT
Start: 2019-04-11 | End: 2019-05-29

## 2019-04-11 NOTE — TELEPHONE ENCOUNTER
Received incoming phone call from pt stating she's still running into issues with the Zyprexa. Agreed to call [employer] to resolve the issue. Called pharmacy at [phone]. They confirmed that Zyprexa 20 mg tabs will be covered, if the order reads half tab BID. Writer sent new Rx reflecting this and notified pt.

## 2019-04-16 ENCOUNTER — CARE COORDINATION (OUTPATIENT)
Dept: PSYCHIATRY | Facility: CLINIC | Age: 49
End: 2019-04-16

## 2019-04-16 NOTE — PROGRESS NOTES
Service Date: 03/18/2019      PSYCHIATRY CLINIC PROGRESS NOTE      The patient returns for medication management and supportive therapy.      Interim History   Since the last visit the patient is feeling worse, experiencing some periods of mixed state and rapid cycling. Her multiple severe stresses are unchanged.      RECENT SYMPTOMS   Depression:   The patient endorses suicidal ideation without intent, depressed and anxious mood, variable levels of anhedonia, erratic sleep, poor appetite and forcing herself to eat, excessive guilt, feelings of worthlessness, restlessness, and feeling like a failure.       Elevated mood:   The patient endorses racing thoughts, pressured speech, and excessive spending.      Psychosis:   The patient denies symptoms of psychosis.      Panic Attacks:   The patient endorses panic attacks.      Anxiety:   The patient endorses much anxiety with excessive worry, feelings of fearfulness, nervousness, and tension.      Trauma-related:   The patient denies symptoms related to trauma.      ADVERSE EFFECTS:   1. Dry mouth   2. Mild dizziness      MEDICAL CONCERNS:   1. Colitis   2. Underweight      SUBSTANCE USE:   The patient denies all recent use of substances of abuse.      SOCIAL/FAMILY HISTORY:   Unchanged from last visit.      MEDICAL/SURGICAL HISTORY:   See EMR medical problems list.      MEDICAL REVIEW OF SYSTEMS:   A comprehensive review of systems was performed and is negative other than as noted in the HPI.      ALLERGY:   None new.      CURRENT MEDICATIONS:   See EMR med sections.      VITALS:   See rooming note.      MENTAL STATUS EXAM:   On mental status examination the patient was alert, well groomed, very thin, and cooperative with the interviewer. Speech was fluent. The patient was fidgety but gait and station were normal. Mood was sad and affect was tearful. Thought processes were logical and devoid of active suicidal ideation or psychosis. Insight and judgment were OK. Patient  was oriented x4 with adequate attention and concentration, intact memory and good fund of knowledge.            DIAGNOSIS AND ASSESSMENT:   1. Bipolar affective disorder Type I, depressed/mixed. She has not tolerated lithium or Depakote.       PLAN:   1. Medications:   a. Increase Zyprexa to 10 mg   2. Therapy:   3. RTC in two weeks   4. Other:   5. Crisis numbers: provided routinely in AVS.      Treatment Risk Statement: The patient understands the risks, benefits, adverse effects, and alternatives. Agrees to treatment with the capacity to do so. No medical contraindications to treatment. Agrees to call clinic for any problems. The patient understands to call 911 or come to the nearest ED is life-threatening or urgent symptoms present.         PIEDAD SADLER MD             D: 2019   T: 2019   MT: PANKAJ      Name:     KEE TINEO   MRN:      [id number]        Account:      [id number]   :      1970           Service Date: 2019      Document: [id number]

## 2019-04-17 ENCOUNTER — TELEPHONE (OUTPATIENT)
Dept: PSYCHIATRY | Facility: CLINIC | Age: 49
End: 2019-04-17

## 2019-04-17 NOTE — TELEPHONE ENCOUNTER
"Writer received incoming call from patient [phone]. Patient reports waking up with anxiety. She recently received bad news about her mother who is currently in ICU. Patient is also anxious and frustrated with family who will not let her visit her mother. Reports experiencing sadness, anxiety, increase mood swings, crying a lot, and overwhelmed. She also went on by saying \" things are just not getting better and they keep getting worse.\" Today she took scheduled meds as well as PRN and reports anxiety improved but still continues to experience sadness. She also shared a friend who was over earlier in the morning which was helpful. She also plans to invite another friend at 3 pm today to give company. She denies any safety concerns and agreed to admit to the ED if symptoms worsen. Writer informed patient to exercise, watch TV or call a friend to distract self. Patient also reminded during the last incident she was able to complete house chores which helped manager her anxiety. Patient agreed with the plan but requested to receive a call from provider. Writer informed patient a message would be sent to provider. Writer also agreed to reach out to the patient before the end of the day.     Routed to provider.   "

## 2019-04-17 NOTE — TELEPHONE ENCOUNTER
Writer placed a call to patient at [phone]. Patient reports feeling much better after taking a nap. She also reports she was able to shower and run errands which helped distract self. Patient also received a call from provider. Denies safety concerns at this time.     No further action needed by this writer

## 2019-04-22 ENCOUNTER — MYC MEDICAL ADVICE (OUTPATIENT)
Dept: PSYCHIATRY | Facility: CLINIC | Age: 49
End: 2019-04-22

## 2019-04-22 ENCOUNTER — TELEPHONE (OUTPATIENT)
Dept: PSYCHIATRY | Facility: CLINIC | Age: 49
End: 2019-04-22

## 2019-04-22 DIAGNOSIS — F31.9 BIPOLAR I DISORDER (H): ICD-10-CM

## 2019-04-22 RX ORDER — CLONAZEPAM 1 MG/1
TABLET ORAL
Qty: 120 TABLET | Refills: 0
Start: 2019-04-22 | End: 2019-06-06

## 2019-04-22 NOTE — TELEPHONE ENCOUNTER
"Writer received incoming phone call from the pt with an update and request. Per pt, Dr. Rodriguez is out of network and the bill for his appts are quite expensive. She just received the bill from the last appt and is requesting to reschedule this week's appt due to the financial burden. Pt repeated multiple times, \"I just need to take a break and catch up.\"     Writer inquired about her mental state. Pt said, \"I'm hanging in there.\" She went onto say that she has many supportive people in her life and she's taking all of her medications. Although, her depression continues to \"rev up\" and her mood continues to be cyclical. She also told this writer that she has been drinking alcohol, which the provider is aware of the. The last time she had a drink was two days ago.     At this time, the pt is requesting to reschedule for next week with the provider. Agreed to reach out to him and will then call her back with available days/times. The pt would prefer early afternoon if possible.   "

## 2019-04-22 NOTE — TELEPHONE ENCOUNTER
Jaden Rodriguez MD Pratt, Laura, RN   Caller: Unspecified (Today, 12:37 PM)   Phone Number: [phone]             No change        30 d/s phoned to Kimi buck with [employer] at [phone]. Med tab changed to reflect this.

## 2019-04-22 NOTE — TELEPHONE ENCOUNTER
Jaden Rodriguez MD Pratt, Laura, RN   Caller: Unspecified (Today,  9:18 AM)             Either Monday 4/29 at 1:00 or Friday 5/3 at Noon.        Writer called pt and offered these days and times. Pt confirmed that 5/3 at noon will work for her. Message sent to scheduling.

## 2019-04-22 NOTE — TELEPHONE ENCOUNTER
Last seen: 4/1/19  RTC: uncertain, as note is still open  Cancel: 4/22/19  No-show: none  Next appt: 5/3/19     Incoming refill from pt via MyChart     Medication requested: clonazePAM (KLONOPIN) 1 MG tablet  Directions: 1 mg bid and 2 mg qhs  Qty: 120  Last refilled: 3/27/19, 2/26/19, and 1/22/19 per [place]      Medication refill approved per refill protocol  Will confirm with provider that medication dose has not changed before calling to pharmacy

## 2019-04-25 NOTE — PROGRESS NOTES
Service Date: 04/01/2019      PSYCHIATRY CLINIC PROGRESS NOTE      The patient returns for medication management and supportive therapy.      Interim History   Since the last visit depression and anxiety have both been bad. She never got her prescription for Zyprexa via mail order, so she has been taking a half-dose for the past week. Weight up seven pounds.      RECENT SYMPTOMS   Depression:   The patient endorses suicidal ideation without intent, depressed mood, anhedonia, low energy, broken sleep of five hours, pushing herself to eat, feelings of worthlessness, and slowed movements.       Elevated mood:   The patient denies symptoms of linda.      Psychosis:   The patient denies psychosis.      Panic Attacks:   The patient denies panic attacks.      Anxiety:   The patient endorses excessive worry, feelings of fearfulness, nervousness, tension, and feeling overwhelmed.      Trauma-related:   The patient denies symptoms related to trauma.      ADVERSE EFFECTS:   1. Dry mouth.      MEDICAL CONCERNS:   1. Colitis secondary to chronic diarrhea   2. Abnormal PAP (squamous cells), diseased cells removed.      SUBSTANCE USE:   The patient endorses use of tobacco.      SOCIAL/FAMILY HISTORY:   The patient lives alone in her apartment. She is . Otherwise unchanged.      MEDICAL/SURGICAL HISTORY:   See EMR medical problems list.      MEDICAL REVIEW OF SYSTEMS:   A comprehensive review of systems was performed and is negative other than as noted in the HPI.      ALLERGY:   None new.      CURRENT MEDICATIONS:   See EMR med sections.      VITALS:   See rooming note.      MENTAL STATUS EXAM:   On mental status examination, the patient was alert, well groomed, and cooperative with the interviewer. Speech was normal rate and flow. Movements, gait, and station were normal. Mood was sad and anxious with a congruent affect. Thought processes were logical with passive suicidal ideation but no psychosis. Insight and judgment  were OK. Patient was oriented x4 with OK attention and concentration, intact memory, and good fund of knowledge.       Labs and Data:   PHQ9 = 17      DIAGNOSIS AND ASSESSMENT:   1. Bipolar I, depressed, does better with higher dose of Zyprexa.      PLAN:   1. Medications:   a. No changes   b. Prescription reordered   2. Therapy:   3. RTC in one month   4. Other:   5. Crisis numbers: provided routinely in AVS.      Treatment Risk Statement: The patient understands the risks, benefits, adverse effects, and alternatives. Agrees to treatment with the capacity to do so. No medical contraindications to treatment. Agrees to call clinic for any problems. The patient understands to call 911 or come to the nearest ED is life-threatening or urgent symptoms present.         PIEDAD SADLER MD             D: 2019   T: 2019   MT: PANKAJ      Name:     KEE TINEO   MRN:      [id number]        Account:      [id number]   :      1970           Service Date: 2019      Document: [id number]

## 2019-05-03 ENCOUNTER — TELEPHONE (OUTPATIENT)
Dept: PSYCHIATRY | Facility: CLINIC | Age: 49
End: 2019-05-03

## 2019-05-03 NOTE — TELEPHONE ENCOUNTER
Jaden Rodriguez MD Pratt, Laura, RN   Caller: Unspecified (Today,  9:16 AM)             I spoke with her.  Friends will stay with her all weekend and make sure she doesn't drink.

## 2019-05-03 NOTE — TELEPHONE ENCOUNTER
"Writer received incoming phone call from pt and her friend, Nessa. Per pt, she is experiencing massive anxiety and does not know how to control it. She does not want to make a medication change, as she already feels she's on too many medications. The pt is currently depressed and suicidal with a plan and intent. She shared that she will lay out her medications every night and contemplate overdosing. What stops her from acting on these thoughts is the remaining support she has in her life. She has a history of past SA by overdose.     Pt was obviously intoxicated on the phone. Confirmed this with pt's friend, Nessa. He said she's drinking almost daily and her mental health symptoms are exacerbated by the use.    Nessa also shared that today is the first time he's heard her speak of suicide. Discussed ways to limit her access to excess medication or other ways to harm herself. Nessa agreed to visit the pt daily, along with another friend. They will administer pt's medications to her and take the excess with them.     Nessa reports the pt has been utilizing TIPPS and other skills, but feels this was not beneficial.     Pt was then placed back on the phone. She shared with this writer that she \"lost\" her , son, father, and house a few years ago. The pt also said her mother has been very sick and is deteriorating due to Alzheimer's. All of these stressors have led to increase in anxiety, depression, and ETOH use. She reports being sober for 200 days before she relapsed.     Writer confirmed that she has the [facility] number, along with other resources. At this time, the pt is refusing to come into clinic or the ED, but agreed that if she feels worse, she will come to [facility] ED. Writer also asked that Nessa call 911 if he's at all concerned for her safety and is unable to manage this. He voiced understanding.    Pt had scheduled appt with provider today, 5/3 at 11 am, which she cancelled. Pt reports her anxiety " is too high and she'd prefer not to come to clinic. Agreed to discuss with the provider and see if he can still call her, as she's obviously in a great deal of distress. Writer paged provider and forwarded this message to him.

## 2019-05-06 ENCOUNTER — MYC MEDICAL ADVICE (OUTPATIENT)
Dept: PSYCHIATRY | Facility: CLINIC | Age: 49
End: 2019-05-06

## 2019-05-06 ENCOUNTER — TELEPHONE (OUTPATIENT)
Dept: PSYCHIATRY | Facility: CLINIC | Age: 49
End: 2019-05-06

## 2019-05-06 ENCOUNTER — OFFICE VISIT (OUTPATIENT)
Dept: PSYCHIATRY | Facility: CLINIC | Age: 49
End: 2019-05-06
Attending: PSYCHIATRY & NEUROLOGY
Payer: COMMERCIAL

## 2019-05-06 VITALS
BODY MASS INDEX: 18.13 KG/M2 | WEIGHT: 109.8 LBS | TEMPERATURE: 97.7 F | DIASTOLIC BLOOD PRESSURE: 119 MMHG | SYSTOLIC BLOOD PRESSURE: 174 MMHG | HEART RATE: 99 BPM

## 2019-05-06 DIAGNOSIS — F31.9 BIPOLAR I DISORDER (H): ICD-10-CM

## 2019-05-06 PROCEDURE — G0463 HOSPITAL OUTPT CLINIC VISIT: HCPCS | Mod: ZF

## 2019-05-06 RX ORDER — MIRTAZAPINE 30 MG/1
60 TABLET, FILM COATED ORAL AT BEDTIME
Qty: 180 TABLET | Refills: 1 | Status: SHIPPED | OUTPATIENT
Start: 2019-05-06 | End: 2019-08-02

## 2019-05-06 RX ORDER — HYDROXYZINE PAMOATE 50 MG/1
100 CAPSULE ORAL 2 TIMES DAILY PRN
Qty: 120 CAPSULE | Refills: 1 | Status: SHIPPED | OUTPATIENT
Start: 2019-05-06 | End: 2019-05-06 | Stop reason: ALTCHOICE

## 2019-05-06 RX ORDER — CITALOPRAM HYDROBROMIDE 20 MG/1
20 TABLET ORAL DAILY
Qty: 30 TABLET | Refills: 3 | Status: SHIPPED | OUTPATIENT
Start: 2019-05-06 | End: 2019-08-16

## 2019-05-06 RX ORDER — HYDROXYZINE HYDROCHLORIDE 50 MG/1
100 TABLET, FILM COATED ORAL 2 TIMES DAILY PRN
Qty: 120 TABLET | Refills: 1 | Status: SHIPPED | OUTPATIENT
Start: 2019-05-06 | End: 2019-09-16 | Stop reason: DRUGHIGH

## 2019-05-06 ASSESSMENT — PAIN SCALES - GENERAL: PAINLEVEL: NO PAIN (0)

## 2019-05-06 ASSESSMENT — PATIENT HEALTH QUESTIONNAIRE - PHQ9: SUM OF ALL RESPONSES TO PHQ QUESTIONS 1-9: 20

## 2019-05-11 ENCOUNTER — HOSPITAL ENCOUNTER (EMERGENCY)
Facility: CLINIC | Age: 49
Discharge: ANOTHER HOSPITAL | End: 2019-05-11
Attending: EMERGENCY MEDICINE | Admitting: EMERGENCY MEDICINE
Payer: COMMERCIAL

## 2019-05-11 ENCOUNTER — APPOINTMENT (OUTPATIENT)
Dept: GENERAL RADIOLOGY | Facility: CLINIC | Age: 49
End: 2019-05-11
Attending: EMERGENCY MEDICINE
Payer: COMMERCIAL

## 2019-05-11 ENCOUNTER — APPOINTMENT (OUTPATIENT)
Dept: CT IMAGING | Facility: CLINIC | Age: 49
End: 2019-05-11
Attending: EMERGENCY MEDICINE
Payer: COMMERCIAL

## 2019-05-11 ENCOUNTER — HOSPITAL ENCOUNTER (INPATIENT)
Facility: CLINIC | Age: 49
LOS: 18 days | Discharge: HOME | DRG: 987 | End: 2019-05-29
Attending: ANESTHESIOLOGY | Admitting: ANESTHESIOLOGY
Payer: COMMERCIAL

## 2019-05-11 VITALS
SYSTOLIC BLOOD PRESSURE: 71 MMHG | OXYGEN SATURATION: 97 % | WEIGHT: 109 LBS | RESPIRATION RATE: 17 BRPM | DIASTOLIC BLOOD PRESSURE: 45 MMHG | TEMPERATURE: 97.4 F | HEART RATE: 65 BPM | BODY MASS INDEX: 18 KG/M2

## 2019-05-11 DIAGNOSIS — R57.9 SHOCK (H): ICD-10-CM

## 2019-05-11 DIAGNOSIS — K85.90 ACUTE PANCREATITIS, UNSPECIFIED COMPLICATION STATUS, UNSPECIFIED PANCREATITIS TYPE: ICD-10-CM

## 2019-05-11 DIAGNOSIS — F31.9 BIPOLAR I DISORDER (H): ICD-10-CM

## 2019-05-11 DIAGNOSIS — E87.20 LACTIC ACIDOSIS: ICD-10-CM

## 2019-05-11 DIAGNOSIS — I82.90 ACUTE DEEP VEIN THROMBOSIS (DVT) OF NON-EXTREMITY VEIN: ICD-10-CM

## 2019-05-11 DIAGNOSIS — R05.9 COUGH: ICD-10-CM

## 2019-05-11 DIAGNOSIS — T44.7X2A SUICIDE ATTEMPT BY BETA-ADRENERGIC ANTAGONIST OVERDOSE, INITIAL ENCOUNTER (H): ICD-10-CM

## 2019-05-11 DIAGNOSIS — T39.1X4S: Primary | ICD-10-CM

## 2019-05-11 DIAGNOSIS — I10 BENIGN ESSENTIAL HYPERTENSION: ICD-10-CM

## 2019-05-11 DIAGNOSIS — T14.91XA SUICIDE ATTEMPT (H): ICD-10-CM

## 2019-05-11 LAB
ALBUMIN SERPL-MCNC: 3.4 G/DL (ref 3.4–5)
ALBUMIN UR-MCNC: 10 MG/DL
ALP SERPL-CCNC: 102 U/L (ref 40–150)
ALT SERPL W P-5'-P-CCNC: 22 U/L (ref 0–50)
AMMONIA PLAS-SCNC: 32 UMOL/L (ref 10–50)
AMORPH CRY #/AREA URNS HPF: ABNORMAL /HPF
AMPHETAMINES UR QL SCN: NEGATIVE
ANION GAP SERPL CALCULATED.3IONS-SCNC: 9 MMOL/L (ref 3–14)
APAP SERPL-MCNC: <2 MG/L (ref 10–20)
APPEARANCE UR: ABNORMAL
AST SERPL W P-5'-P-CCNC: 54 U/L (ref 0–45)
BACTERIA #/AREA URNS HPF: ABNORMAL /HPF
BARBITURATES UR QL: NEGATIVE
BASOPHILS # BLD AUTO: 0.1 10E9/L (ref 0–0.2)
BASOPHILS NFR BLD AUTO: 0.4 %
BENZODIAZ UR QL: NEGATIVE
BILIRUB SERPL-MCNC: 0.6 MG/DL (ref 0.2–1.3)
BILIRUB UR QL STRIP: NEGATIVE
BUN SERPL-MCNC: 20 MG/DL (ref 7–30)
CA-I BLD-MCNC: 4.6 MG/DL (ref 4.4–5.2)
CALCIUM SERPL-MCNC: 8.4 MG/DL (ref 8.5–10.1)
CANNABINOIDS UR QL SCN: NEGATIVE
CHLORIDE SERPL-SCNC: 97 MMOL/L (ref 94–109)
CO2 BLDCOV-SCNC: 18 MMOL/L (ref 21–28)
CO2 BLDCOV-SCNC: 19 MMOL/L (ref 21–28)
CO2 BLDCOV-SCNC: 29 MMOL/L (ref 21–28)
CO2 SERPL-SCNC: 28 MMOL/L (ref 20–32)
COCAINE UR QL: NEGATIVE
COLOR UR AUTO: YELLOW
CREAT SERPL-MCNC: 1.04 MG/DL (ref 0.52–1.04)
DIFFERENTIAL METHOD BLD: ABNORMAL
EOSINOPHIL # BLD AUTO: 0.2 10E9/L (ref 0–0.7)
EOSINOPHIL NFR BLD AUTO: 0.9 %
ERYTHROCYTE [DISTWIDTH] IN BLOOD BY AUTOMATED COUNT: 13.5 % (ref 10–15)
ETHANOL SERPL-MCNC: <0.01 G/DL
GFR SERPL CREATININE-BSD FRML MDRD: 63 ML/MIN/{1.73_M2}
GLUCOSE BLDC GLUCOMTR-MCNC: 130 MG/DL (ref 70–99)
GLUCOSE BLDC GLUCOMTR-MCNC: 151 MG/DL (ref 70–99)
GLUCOSE BLDC GLUCOMTR-MCNC: 258 MG/DL (ref 70–99)
GLUCOSE BLDC GLUCOMTR-MCNC: 380 MG/DL (ref 70–99)
GLUCOSE BLDC GLUCOMTR-MCNC: 92 MG/DL (ref 70–99)
GLUCOSE SERPL-MCNC: 146 MG/DL (ref 70–99)
GLUCOSE UR STRIP-MCNC: 150 MG/DL
HCG SERPL QL: NEGATIVE
HCT VFR BLD AUTO: 42.8 % (ref 35–47)
HGB BLD-MCNC: 14.6 G/DL (ref 11.7–15.7)
HGB UR QL STRIP: NEGATIVE
IMM GRANULOCYTES # BLD: 0.3 10E9/L (ref 0–0.4)
IMM GRANULOCYTES NFR BLD: 1.4 %
INR PPP: 1.07 (ref 0.86–1.14)
KETONES UR STRIP-MCNC: NEGATIVE MG/DL
LACTATE BLD-SCNC: 2.5 MMOL/L (ref 0.7–2.1)
LACTATE BLD-SCNC: 2.9 MMOL/L (ref 0.7–2.1)
LACTATE BLD-SCNC: 3.6 MMOL/L (ref 0.7–2.1)
LEUKOCYTE ESTERASE UR QL STRIP: ABNORMAL
LIPASE SERPL-CCNC: 1100 U/L (ref 73–393)
LYMPHOCYTES # BLD AUTO: 2.7 10E9/L (ref 0.8–5.3)
LYMPHOCYTES NFR BLD AUTO: 11.5 %
MAGNESIUM SERPL-MCNC: 1.5 MG/DL (ref 1.6–2.3)
MCH RBC QN AUTO: 29.9 PG (ref 26.5–33)
MCHC RBC AUTO-ENTMCNC: 34.1 G/DL (ref 31.5–36.5)
MCV RBC AUTO: 88 FL (ref 78–100)
MONOCYTES # BLD AUTO: 2.8 10E9/L (ref 0–1.3)
MONOCYTES NFR BLD AUTO: 11.6 %
NEUTROPHILS # BLD AUTO: 17.6 10E9/L (ref 1.6–8.3)
NEUTROPHILS NFR BLD AUTO: 74.2 %
NITRATE UR QL: NEGATIVE
NRBC # BLD AUTO: 0 10*3/UL
NRBC BLD AUTO-RTO: 0 /100
NT-PROBNP SERPL-MCNC: 417 PG/ML (ref 0–450)
OPIATES UR QL SCN: NEGATIVE
PCO2 BLDV: 30 MM HG (ref 40–50)
PCO2 BLDV: 34 MM HG (ref 40–50)
PCO2 BLDV: 52 MM HG (ref 40–50)
PCP UR QL SCN: NEGATIVE
PH BLDV: 7.36 PH (ref 7.32–7.43)
PH BLDV: 7.36 PH (ref 7.32–7.43)
PH BLDV: 7.38 PH (ref 7.32–7.43)
PH UR STRIP: 7 PH (ref 5–7)
PLATELET # BLD AUTO: 299 10E9/L (ref 150–450)
PO2 BLDV: 25 MM HG (ref 25–47)
PO2 BLDV: 51 MM HG (ref 25–47)
PO2 BLDV: 52 MM HG (ref 25–47)
POTASSIUM SERPL-SCNC: 4.2 MMOL/L (ref 3.4–5.3)
PROT SERPL-MCNC: 6.9 G/DL (ref 6.8–8.8)
RBC # BLD AUTO: 4.88 10E12/L (ref 3.8–5.2)
RBC #/AREA URNS AUTO: 3 /HPF (ref 0–2)
SALICYLATES SERPL-MCNC: <2 MG/DL
SAO2 % BLDV FROM PO2: 43 %
SAO2 % BLDV FROM PO2: 85 %
SAO2 % BLDV FROM PO2: 85 %
SODIUM SERPL-SCNC: 134 MMOL/L (ref 133–144)
SOURCE: ABNORMAL
SP GR UR STRIP: 1.01 (ref 1–1.03)
T4 FREE SERPL-MCNC: 1.04 NG/DL (ref 0.76–1.46)
TROPONIN I SERPL-MCNC: <0.015 UG/L (ref 0–0.04)
TSH SERPL DL<=0.005 MIU/L-ACNC: 7.01 MU/L (ref 0.4–4)
UROBILINOGEN UR STRIP-MCNC: NORMAL MG/DL (ref 0–2)
WBC # BLD AUTO: 23.8 10E9/L (ref 4–11)
WBC #/AREA URNS AUTO: 9 /HPF (ref 0–5)

## 2019-05-11 PROCEDURE — 00000146 ZZHCL STATISTIC GLUCOSE BY METER IP

## 2019-05-11 PROCEDURE — 80047 BASIC METABLC PNL IONIZED CA: CPT | Mod: 91 | Performed by: EMERGENCY MEDICINE

## 2019-05-11 PROCEDURE — 84484 ASSAY OF TROPONIN QUANT: CPT | Performed by: EMERGENCY MEDICINE

## 2019-05-11 PROCEDURE — 93005 ELECTROCARDIOGRAM TRACING: CPT | Mod: 59

## 2019-05-11 PROCEDURE — 27210131 ZZH KIT ART LINE INSERTION

## 2019-05-11 PROCEDURE — 82140 ASSAY OF AMMONIA: CPT | Performed by: EMERGENCY MEDICINE

## 2019-05-11 PROCEDURE — 96368 THER/DIAG CONCURRENT INF: CPT

## 2019-05-11 PROCEDURE — 83690 ASSAY OF LIPASE: CPT | Performed by: EMERGENCY MEDICINE

## 2019-05-11 PROCEDURE — 80307 DRUG TEST PRSMV CHEM ANLYZR: CPT | Performed by: EMERGENCY MEDICINE

## 2019-05-11 PROCEDURE — 71045 X-RAY EXAM CHEST 1 VIEW: CPT | Mod: 59

## 2019-05-11 PROCEDURE — 99291 CRITICAL CARE FIRST HOUR: CPT | Mod: 25

## 2019-05-11 PROCEDURE — 83605 ASSAY OF LACTIC ACID: CPT

## 2019-05-11 PROCEDURE — 87088 URINE BACTERIA CULTURE: CPT | Performed by: EMERGENCY MEDICINE

## 2019-05-11 PROCEDURE — 84145 PROCALCITONIN (PCT): CPT | Performed by: EMERGENCY MEDICINE

## 2019-05-11 PROCEDURE — 20000004 ZZH R&B ICU UMMC

## 2019-05-11 PROCEDURE — 82803 BLOOD GASES ANY COMBINATION: CPT

## 2019-05-11 PROCEDURE — 81001 URINALYSIS AUTO W/SCOPE: CPT | Mod: XU | Performed by: EMERGENCY MEDICINE

## 2019-05-11 PROCEDURE — 83880 ASSAY OF NATRIURETIC PEPTIDE: CPT | Performed by: EMERGENCY MEDICINE

## 2019-05-11 PROCEDURE — 80320 DRUG SCREEN QUANTALCOHOLS: CPT | Performed by: EMERGENCY MEDICINE

## 2019-05-11 PROCEDURE — 25000131 ZZH RX MED GY IP 250 OP 636 PS 637: Performed by: EMERGENCY MEDICINE

## 2019-05-11 PROCEDURE — 83735 ASSAY OF MAGNESIUM: CPT | Performed by: EMERGENCY MEDICINE

## 2019-05-11 PROCEDURE — 25000128 H RX IP 250 OP 636: Performed by: EMERGENCY MEDICINE

## 2019-05-11 PROCEDURE — 85610 PROTHROMBIN TIME: CPT | Performed by: EMERGENCY MEDICINE

## 2019-05-11 PROCEDURE — 96375 TX/PRO/DX INJ NEW DRUG ADDON: CPT

## 2019-05-11 PROCEDURE — 85025 COMPLETE CBC W/AUTO DIFF WBC: CPT | Performed by: EMERGENCY MEDICINE

## 2019-05-11 PROCEDURE — 82330 ASSAY OF CALCIUM: CPT | Mod: 91 | Performed by: EMERGENCY MEDICINE

## 2019-05-11 PROCEDURE — 96365 THER/PROPH/DIAG IV INF INIT: CPT | Mod: 59

## 2019-05-11 PROCEDURE — 87040 BLOOD CULTURE FOR BACTERIA: CPT | Performed by: EMERGENCY MEDICINE

## 2019-05-11 PROCEDURE — 36556 INSERT NON-TUNNEL CV CATH: CPT

## 2019-05-11 PROCEDURE — 84703 CHORIONIC GONADOTROPIN ASSAY: CPT | Performed by: EMERGENCY MEDICINE

## 2019-05-11 PROCEDURE — 85014 HEMATOCRIT: CPT | Mod: 91 | Performed by: EMERGENCY MEDICINE

## 2019-05-11 PROCEDURE — 25800030 ZZH RX IP 258 OP 636: Performed by: EMERGENCY MEDICINE

## 2019-05-11 PROCEDURE — 27210179 ZZH KIT MONITOR CVP

## 2019-05-11 PROCEDURE — 36620 INSERTION CATHETER ARTERY: CPT

## 2019-05-11 PROCEDURE — 84443 ASSAY THYROID STIM HORMONE: CPT | Performed by: EMERGENCY MEDICINE

## 2019-05-11 PROCEDURE — 25800025 ZZH RX 258: Performed by: EMERGENCY MEDICINE

## 2019-05-11 PROCEDURE — 84439 ASSAY OF FREE THYROXINE: CPT | Performed by: EMERGENCY MEDICINE

## 2019-05-11 PROCEDURE — 80329 ANALGESICS NON-OPIOID 1 OR 2: CPT | Mod: 91 | Performed by: EMERGENCY MEDICINE

## 2019-05-11 PROCEDURE — 87186 SC STD MICRODIL/AGAR DIL: CPT | Performed by: EMERGENCY MEDICINE

## 2019-05-11 PROCEDURE — 74177 CT ABD & PELVIS W/CONTRAST: CPT | Mod: 59

## 2019-05-11 PROCEDURE — 80053 COMPREHEN METABOLIC PANEL: CPT | Performed by: EMERGENCY MEDICINE

## 2019-05-11 PROCEDURE — 99292 CRITICAL CARE ADDL 30 MIN: CPT

## 2019-05-11 PROCEDURE — 80329 ANALGESICS NON-OPIOID 1 OR 2: CPT | Performed by: EMERGENCY MEDICINE

## 2019-05-11 PROCEDURE — 25000128 H RX IP 250 OP 636

## 2019-05-11 PROCEDURE — 25000125 ZZHC RX 250: Performed by: EMERGENCY MEDICINE

## 2019-05-11 PROCEDURE — 93005 ELECTROCARDIOGRAM TRACING: CPT | Mod: 76,59

## 2019-05-11 PROCEDURE — 87086 URINE CULTURE/COLONY COUNT: CPT | Performed by: EMERGENCY MEDICINE

## 2019-05-11 RX ORDER — IOPAMIDOL 755 MG/ML
500 INJECTION, SOLUTION INTRAVASCULAR ONCE
Status: COMPLETED | OUTPATIENT
Start: 2019-05-11 | End: 2019-05-11

## 2019-05-11 RX ORDER — POTASSIUM CHLORIDE 7.45 MG/ML
10 INJECTION INTRAVENOUS
Status: DISCONTINUED | OUTPATIENT
Start: 2019-05-11 | End: 2019-05-13

## 2019-05-11 RX ORDER — DEXTROSE MONOHYDRATE 100 MG/ML
INJECTION, SOLUTION INTRAVENOUS CONTINUOUS
Status: DISCONTINUED | OUTPATIENT
Start: 2019-05-11 | End: 2019-05-11 | Stop reason: HOSPADM

## 2019-05-11 RX ORDER — DEXTROSE MONOHYDRATE 25 G/50ML
25 INJECTION, SOLUTION INTRAVENOUS
Status: DISCONTINUED | OUTPATIENT
Start: 2019-05-11 | End: 2019-05-11 | Stop reason: HOSPADM

## 2019-05-11 RX ORDER — NALOXONE HYDROCHLORIDE 0.4 MG/ML
5 INJECTION, SOLUTION INTRAMUSCULAR; INTRAVENOUS; SUBCUTANEOUS ONCE
Status: CANCELLED | OUTPATIENT
Start: 2019-05-11

## 2019-05-11 RX ORDER — MAGNESIUM SULFATE 1 G/100ML
1 INJECTION INTRAVENOUS DAILY PRN
Status: DISCONTINUED | OUTPATIENT
Start: 2019-05-11 | End: 2019-05-13

## 2019-05-11 RX ORDER — MAGNESIUM SULFATE HEPTAHYDRATE 40 MG/ML
4 INJECTION, SOLUTION INTRAVENOUS ONCE
Status: COMPLETED | OUTPATIENT
Start: 2019-05-12 | End: 2019-05-12

## 2019-05-11 RX ORDER — DEXTROSE MONOHYDRATE 100 MG/ML
INJECTION, SOLUTION INTRAVENOUS CONTINUOUS
Status: DISCONTINUED | OUTPATIENT
Start: 2019-05-12 | End: 2019-05-12

## 2019-05-11 RX ORDER — POTASSIUM CHLORIDE 750 MG/1
20-40 TABLET, EXTENDED RELEASE ORAL
Status: DISCONTINUED | OUTPATIENT
Start: 2019-05-11 | End: 2019-05-13

## 2019-05-11 RX ORDER — VANCOMYCIN HYDROCHLORIDE 1 G/200ML
1000 INJECTION, SOLUTION INTRAVENOUS ONCE
Status: DISCONTINUED | OUTPATIENT
Start: 2019-05-11 | End: 2019-05-11

## 2019-05-11 RX ORDER — AMOXICILLIN 250 MG
2 CAPSULE ORAL 2 TIMES DAILY PRN
Status: CANCELLED | OUTPATIENT
Start: 2019-05-11

## 2019-05-11 RX ORDER — POTASSIUM CHLORIDE 1.5 G/1.58G
20-40 POWDER, FOR SOLUTION ORAL
Status: DISCONTINUED | OUTPATIENT
Start: 2019-05-11 | End: 2019-05-13

## 2019-05-11 RX ORDER — NALOXONE HYDROCHLORIDE 0.4 MG/ML
.1-.4 INJECTION, SOLUTION INTRAMUSCULAR; INTRAVENOUS; SUBCUTANEOUS
Status: CANCELLED | OUTPATIENT
Start: 2019-05-11

## 2019-05-11 RX ORDER — DEXTROSE MONOHYDRATE 25 G/50ML
25 INJECTION, SOLUTION INTRAVENOUS EVERY 30 MIN PRN
Status: DISCONTINUED | OUTPATIENT
Start: 2019-05-11 | End: 2019-05-23

## 2019-05-11 RX ORDER — DEXTROSE MONOHYDRATE 25 G/50ML
INJECTION, SOLUTION INTRAVENOUS
Status: COMPLETED
Start: 2019-05-11 | End: 2019-05-12

## 2019-05-11 RX ORDER — AMOXICILLIN 250 MG
1 CAPSULE ORAL 2 TIMES DAILY PRN
Status: DISCONTINUED | OUTPATIENT
Start: 2019-05-11 | End: 2019-05-23

## 2019-05-11 RX ORDER — NOREPINEPHRINE BITARTRATE 0.06 MG/ML
0.03-0.4 INJECTION, SOLUTION INTRAVENOUS CONTINUOUS
Status: CANCELLED | OUTPATIENT
Start: 2019-05-12

## 2019-05-11 RX ORDER — AMOXICILLIN 250 MG
2 CAPSULE ORAL 2 TIMES DAILY PRN
Status: DISCONTINUED | OUTPATIENT
Start: 2019-05-11 | End: 2019-05-23

## 2019-05-11 RX ORDER — MAGNESIUM SULFATE HEPTAHYDRATE 40 MG/ML
4 INJECTION, SOLUTION INTRAVENOUS DAILY PRN
Status: DISCONTINUED | OUTPATIENT
Start: 2019-05-11 | End: 2019-05-13

## 2019-05-11 RX ORDER — AMOXICILLIN 250 MG
1 CAPSULE ORAL 2 TIMES DAILY PRN
Status: CANCELLED | OUTPATIENT
Start: 2019-05-11

## 2019-05-11 RX ORDER — NALOXONE HYDROCHLORIDE 0.4 MG/ML
.1-.4 INJECTION, SOLUTION INTRAMUSCULAR; INTRAVENOUS; SUBCUTANEOUS
Status: DISCONTINUED | OUTPATIENT
Start: 2019-05-11 | End: 2019-05-12

## 2019-05-11 RX ORDER — NOREPINEPHRINE BITARTRATE 0.06 MG/ML
0.03-0.4 INJECTION, SOLUTION INTRAVENOUS CONTINUOUS
Status: DISCONTINUED | OUTPATIENT
Start: 2019-05-11 | End: 2019-05-11 | Stop reason: HOSPADM

## 2019-05-11 RX ORDER — DEXTROSE MONOHYDRATE 25 G/50ML
25 INJECTION, SOLUTION INTRAVENOUS
Status: DISCONTINUED | OUTPATIENT
Start: 2019-05-11 | End: 2019-05-23

## 2019-05-11 RX ORDER — NOREPINEPHRINE BITARTRATE 0.06 MG/ML
.03-.65 INJECTION, SOLUTION INTRAVENOUS CONTINUOUS
Status: DISCONTINUED | OUTPATIENT
Start: 2019-05-12 | End: 2019-05-18 | Stop reason: CLARIF

## 2019-05-11 RX ORDER — DEXTROSE MONOHYDRATE 25 G/50ML
25 INJECTION, SOLUTION INTRAVENOUS EVERY 30 MIN PRN
Status: DISCONTINUED | OUTPATIENT
Start: 2019-05-11 | End: 2019-05-11 | Stop reason: HOSPADM

## 2019-05-11 RX ORDER — LEVOTHYROXINE SODIUM 50 UG/1
50 TABLET ORAL DAILY
Status: CANCELLED | OUTPATIENT
Start: 2019-05-12

## 2019-05-11 RX ORDER — ALBUTEROL SULFATE 90 UG/1
2 AEROSOL, METERED RESPIRATORY (INHALATION) EVERY 6 HOURS PRN
Status: CANCELLED | OUTPATIENT
Start: 2019-05-11

## 2019-05-11 RX ORDER — CEFEPIME HYDROCHLORIDE 1 G/1
1 INJECTION, POWDER, FOR SOLUTION INTRAMUSCULAR; INTRAVENOUS ONCE
Status: COMPLETED | OUTPATIENT
Start: 2019-05-11 | End: 2019-05-11

## 2019-05-11 RX ORDER — CALCIUM GLUCONATE 94 MG/ML
2 INJECTION, SOLUTION INTRAVENOUS ONCE
Status: DISCONTINUED | OUTPATIENT
Start: 2019-05-12 | End: 2019-05-12

## 2019-05-11 RX ORDER — NALOXONE HYDROCHLORIDE 0.4 MG/ML
5 INJECTION, SOLUTION INTRAMUSCULAR; INTRAVENOUS; SUBCUTANEOUS
Status: DISCONTINUED | OUTPATIENT
Start: 2019-05-11 | End: 2019-05-14

## 2019-05-11 RX ORDER — POTASSIUM CHLORIDE 29.8 MG/ML
20 INJECTION INTRAVENOUS
Status: DISCONTINUED | OUTPATIENT
Start: 2019-05-11 | End: 2019-05-13

## 2019-05-11 RX ADMIN — CEFEPIME HYDROCHLORIDE 1 G: 1 INJECTION, POWDER, FOR SOLUTION INTRAMUSCULAR; INTRAVENOUS at 21:28

## 2019-05-11 RX ADMIN — Medication 0.06 MCG/KG/MIN: at 20:52

## 2019-05-11 RX ADMIN — GLUCAGON HYDROCHLORIDE 5 MG/HR: KIT at 22:21

## 2019-05-11 RX ADMIN — CALCIUM CHLORIDE 1 G: 100 INJECTION, SOLUTION INTRAVENOUS at 21:38

## 2019-05-11 RX ADMIN — GLUCAGON HYDROCHLORIDE 1 MG: KIT at 21:25

## 2019-05-11 RX ADMIN — DEXTROSE MONOHYDRATE 25 G: 25 INJECTION, SOLUTION INTRAVENOUS at 21:42

## 2019-05-11 RX ADMIN — EPINEPHRINE 0.07 MCG/KG/MIN: 1 INJECTION INTRAMUSCULAR; INTRAVENOUS; SUBCUTANEOUS at 21:55

## 2019-05-11 RX ADMIN — IOPAMIDOL 54 ML: 755 INJECTION, SOLUTION INTRAVENOUS at 22:13

## 2019-05-11 RX ADMIN — SODIUM CHLORIDE 1 UNITS/KG/HR: 9 INJECTION, SOLUTION INTRAVENOUS at 21:52

## 2019-05-11 RX ADMIN — Medication 2 G: at 21:10

## 2019-05-11 RX ADMIN — GLUCAGON HYDROCHLORIDE 1 MG: KIT at 21:54

## 2019-05-11 RX ADMIN — METRONIDAZOLE 500 MG: 500 INJECTION, SOLUTION INTRAVENOUS at 21:40

## 2019-05-11 RX ADMIN — SODIUM CHLORIDE 1000 ML: 9 INJECTION, SOLUTION INTRAVENOUS at 20:23

## 2019-05-11 ASSESSMENT — ENCOUNTER SYMPTOMS
VOMITING: 0
DYSURIA: 0
SHORTNESS OF BREATH: 0
NAUSEA: 0
ABDOMINAL PAIN: 0
WEAKNESS: 0

## 2019-05-11 NOTE — LETTER
Transition Communication Hand-off for Care Transitions to Next Level of Care Provider    Name: Ana Laura Wharton  : 1970  MRN #: [id number]  Primary Care Provider: Liborio Lincoln     [facility] [address]     Reason for Hospitalization:  Overdose    Admit Date/Time: 2019 11:47 PM  Discharge Date: 2019    Payor Source: Payor: [employer] / Plan: [employer] MEDICARE ADVANTAGE / Product Type: Medicare /     Discharge Needs Assessment:  Needs      Most Recent Value   Equipment Currently Used at Home  none          Follow-up plan:    Future Appointments   Date Time Provider Department Center   2019  4:00 PM Jaden Rodriguez MD [facility]   2019  9:30 AM [facility]       [facility] Recommendations:  Please review AVS    Aleah Fofana RN, BSN, PHN  Medicine Care Coordinator  [facility]  Desk Phone: [phone]    AVS/Discharge Summary is the source of truth; this is a helpful guide for improved communication of patient story

## 2019-05-12 ENCOUNTER — APPOINTMENT (OUTPATIENT)
Dept: GENERAL RADIOLOGY | Facility: CLINIC | Age: 49
DRG: 987 | End: 2019-05-12
Attending: ANESTHESIOLOGY
Payer: COMMERCIAL

## 2019-05-12 ENCOUNTER — ANESTHESIA (OUTPATIENT)
Dept: INTENSIVE CARE | Facility: CLINIC | Age: 49
DRG: 987 | End: 2019-05-12
Payer: COMMERCIAL

## 2019-05-12 ENCOUNTER — ANESTHESIA EVENT (OUTPATIENT)
Dept: INTENSIVE CARE | Facility: CLINIC | Age: 49
DRG: 987 | End: 2019-05-12
Payer: COMMERCIAL

## 2019-05-12 ENCOUNTER — APPOINTMENT (OUTPATIENT)
Dept: CARDIOLOGY | Facility: CLINIC | Age: 49
DRG: 987 | End: 2019-05-12
Attending: ANESTHESIOLOGY
Payer: COMMERCIAL

## 2019-05-12 ENCOUNTER — APPOINTMENT (OUTPATIENT)
Dept: CT IMAGING | Facility: CLINIC | Age: 49
DRG: 987 | End: 2019-05-12
Attending: STUDENT IN AN ORGANIZED HEALTH CARE EDUCATION/TRAINING PROGRAM
Payer: COMMERCIAL

## 2019-05-12 ENCOUNTER — ALLIED HEALTH/NURSE VISIT (OUTPATIENT)
Dept: NEUROLOGY | Facility: CLINIC | Age: 49
DRG: 987 | End: 2019-05-12
Attending: PSYCHIATRY & NEUROLOGY
Payer: COMMERCIAL

## 2019-05-12 ENCOUNTER — APPOINTMENT (OUTPATIENT)
Dept: ULTRASOUND IMAGING | Facility: CLINIC | Age: 49
DRG: 987 | End: 2019-05-12
Attending: ANESTHESIOLOGY
Payer: COMMERCIAL

## 2019-05-12 DIAGNOSIS — R56.9 SEIZURES (H): Primary | ICD-10-CM

## 2019-05-12 LAB
ALBUMIN SERPL-MCNC: 2 G/DL (ref 3.4–5)
ALBUMIN SERPL-MCNC: 2.2 G/DL (ref 3.4–5)
ALBUMIN SERPL-MCNC: 2.2 G/DL (ref 3.4–5)
ALBUMIN SERPL-MCNC: 2.3 G/DL (ref 3.4–5)
ALBUMIN SERPL-MCNC: 2.3 G/DL (ref 3.4–5)
ALBUMIN SERPL-MCNC: 2.6 G/DL (ref 3.4–5)
ALBUMIN UR-MCNC: 100 MG/DL
ALP SERPL-CCNC: 60 U/L (ref 40–150)
ALP SERPL-CCNC: 74 U/L (ref 40–150)
ALP SERPL-CCNC: 85 U/L (ref 40–150)
ALP SERPL-CCNC: 88 U/L (ref 40–150)
ALT SERPL W P-5'-P-CCNC: 33 U/L (ref 0–50)
ALT SERPL W P-5'-P-CCNC: 42 U/L (ref 0–50)
ALT SERPL W P-5'-P-CCNC: 47 U/L (ref 0–50)
ALT SERPL W P-5'-P-CCNC: 74 U/L (ref 0–50)
ALT SERPL W P-5'-P-CCNC: 87 U/L (ref 0–50)
ALT SERPL W P-5'-P-CCNC: 90 U/L (ref 0–50)
ANION GAP SERPL CALCULATED.3IONS-SCNC: 10 MMOL/L (ref 3–14)
ANION GAP SERPL CALCULATED.3IONS-SCNC: 11 MMOL/L (ref 3–14)
ANION GAP SERPL CALCULATED.3IONS-SCNC: 12 MMOL/L (ref 3–14)
ANION GAP SERPL CALCULATED.3IONS-SCNC: 12 MMOL/L (ref 3–14)
ANION GAP SERPL CALCULATED.3IONS-SCNC: 6 MMOL/L (ref 3–14)
ANION GAP SERPL CALCULATED.3IONS-SCNC: 8 MMOL/L (ref 3–14)
ANION GAP SERPL CALCULATED.3IONS-SCNC: 9 MMOL/L (ref 3–14)
APPEARANCE UR: ABNORMAL
AST SERPL W P-5'-P-CCNC: 123 U/L (ref 0–45)
AST SERPL W P-5'-P-CCNC: 145 U/L (ref 0–45)
AST SERPL W P-5'-P-CCNC: 219 U/L (ref 0–45)
AST SERPL W P-5'-P-CCNC: 256 U/L (ref 0–45)
AST SERPL W P-5'-P-CCNC: 258 U/L (ref 0–45)
AST SERPL W P-5'-P-CCNC: 96 U/L (ref 0–45)
BASE DEFICIT BLDA-SCNC: 11.1 MMOL/L
BASE DEFICIT BLDA-SCNC: 15 MMOL/L
BASE DEFICIT BLDA-SCNC: 15.9 MMOL/L
BASE DEFICIT BLDA-SCNC: 2.3 MMOL/L
BASE DEFICIT BLDA-SCNC: 7.3 MMOL/L
BASE DEFICIT BLDA-SCNC: 8.2 MMOL/L
BASE DEFICIT BLDA-SCNC: 9.5 MMOL/L
BASE DEFICIT BLDV-SCNC: 14.6 MMOL/L
BASE DEFICIT BLDV-SCNC: 15.1 MMOL/L
BASE EXCESS BLDA CALC-SCNC: 0.2 MMOL/L
BILIRUB SERPL-MCNC: 0.5 MG/DL (ref 0.2–1.3)
BILIRUB SERPL-MCNC: 0.6 MG/DL (ref 0.2–1.3)
BILIRUB SERPL-MCNC: 0.7 MG/DL (ref 0.2–1.3)
BILIRUB SERPL-MCNC: 0.7 MG/DL (ref 0.2–1.3)
BILIRUB SERPL-MCNC: 0.8 MG/DL (ref 0.2–1.3)
BILIRUB SERPL-MCNC: 1.2 MG/DL (ref 0.2–1.3)
BILIRUB UR QL STRIP: NEGATIVE
BUN SERPL-MCNC: 19 MG/DL (ref 7–30)
BUN SERPL-MCNC: 20 MG/DL (ref 7–30)
BUN SERPL-MCNC: 20 MG/DL (ref 7–30)
BUN SERPL-MCNC: 21 MG/DL (ref 7–30)
BUN SERPL-MCNC: 22 MG/DL (ref 7–30)
CA-I BLD-MCNC: 5.1 MG/DL (ref 4.4–5.2)
CA-I BLD-MCNC: 5.6 MG/DL (ref 4.4–5.2)
CA-I BLD-MCNC: 6 MG/DL (ref 4.4–5.2)
CA-I BLD-MCNC: 6.1 MG/DL (ref 4.4–5.2)
CA-I BLD-MCNC: 6.4 MG/DL (ref 4.4–5.2)
CA-I BLD-MCNC: 7.7 MG/DL (ref 4.4–5.2)
CALCIUM SERPL-MCNC: 12.4 MG/DL (ref 8.5–10.1)
CALCIUM SERPL-MCNC: 7.2 MG/DL (ref 8.5–10.1)
CALCIUM SERPL-MCNC: 8.2 MG/DL (ref 8.5–10.1)
CALCIUM SERPL-MCNC: 8.7 MG/DL (ref 8.5–10.1)
CALCIUM SERPL-MCNC: 8.8 MG/DL (ref 8.5–10.1)
CALCIUM SERPL-MCNC: 9 MG/DL (ref 8.5–10.1)
CALCIUM SERPL-MCNC: 9.1 MG/DL (ref 8.5–10.1)
CHLORIDE SERPL-SCNC: 111 MMOL/L (ref 94–109)
CHLORIDE SERPL-SCNC: 111 MMOL/L (ref 94–109)
CHLORIDE SERPL-SCNC: 112 MMOL/L (ref 94–109)
CHLORIDE SERPL-SCNC: 113 MMOL/L (ref 94–109)
CK SERPL-CCNC: 174 U/L (ref 30–225)
CO2 SERPL-SCNC: 15 MMOL/L (ref 20–32)
CO2 SERPL-SCNC: 15 MMOL/L (ref 20–32)
CO2 SERPL-SCNC: 16 MMOL/L (ref 20–32)
CO2 SERPL-SCNC: 17 MMOL/L (ref 20–32)
CO2 SERPL-SCNC: 18 MMOL/L (ref 20–32)
CO2 SERPL-SCNC: 22 MMOL/L (ref 20–32)
CO2 SERPL-SCNC: 26 MMOL/L (ref 20–32)
COLOR UR AUTO: YELLOW
CORTIS SERPL-MCNC: 47.3 UG/DL (ref 4–22)
CREAT SERPL-MCNC: 0.79 MG/DL (ref 0.52–1.04)
CREAT SERPL-MCNC: 0.81 MG/DL (ref 0.52–1.04)
CREAT SERPL-MCNC: 0.82 MG/DL (ref 0.52–1.04)
CREAT SERPL-MCNC: 0.84 MG/DL (ref 0.52–1.04)
CREAT SERPL-MCNC: 0.86 MG/DL (ref 0.52–1.04)
CREAT SERPL-MCNC: 0.89 MG/DL (ref 0.52–1.04)
CREAT SERPL-MCNC: 0.9 MG/DL (ref 0.52–1.04)
ERYTHROCYTE [DISTWIDTH] IN BLOOD BY AUTOMATED COUNT: 13.5 % (ref 10–15)
ERYTHROCYTE [DISTWIDTH] IN BLOOD BY AUTOMATED COUNT: 13.5 % (ref 10–15)
GFR SERPL CREATININE-BSD FRML MDRD: 75 ML/MIN/{1.73_M2}
GFR SERPL CREATININE-BSD FRML MDRD: 76 ML/MIN/{1.73_M2}
GFR SERPL CREATININE-BSD FRML MDRD: 79 ML/MIN/{1.73_M2}
GFR SERPL CREATININE-BSD FRML MDRD: 81 ML/MIN/{1.73_M2}
GFR SERPL CREATININE-BSD FRML MDRD: 84 ML/MIN/{1.73_M2}
GFR SERPL CREATININE-BSD FRML MDRD: 86 ML/MIN/{1.73_M2}
GFR SERPL CREATININE-BSD FRML MDRD: 88 ML/MIN/{1.73_M2}
GLUCOSE BLDC GLUCOMTR-MCNC: 110 MG/DL (ref 70–99)
GLUCOSE BLDC GLUCOMTR-MCNC: 112 MG/DL (ref 70–99)
GLUCOSE BLDC GLUCOMTR-MCNC: 113 MG/DL (ref 70–99)
GLUCOSE BLDC GLUCOMTR-MCNC: 118 MG/DL (ref 70–99)
GLUCOSE BLDC GLUCOMTR-MCNC: 118 MG/DL (ref 70–99)
GLUCOSE BLDC GLUCOMTR-MCNC: 122 MG/DL (ref 70–99)
GLUCOSE BLDC GLUCOMTR-MCNC: 122 MG/DL (ref 70–99)
GLUCOSE BLDC GLUCOMTR-MCNC: 131 MG/DL (ref 70–99)
GLUCOSE BLDC GLUCOMTR-MCNC: 135 MG/DL (ref 70–99)
GLUCOSE BLDC GLUCOMTR-MCNC: 135 MG/DL (ref 70–99)
GLUCOSE BLDC GLUCOMTR-MCNC: 138 MG/DL (ref 70–99)
GLUCOSE BLDC GLUCOMTR-MCNC: 138 MG/DL (ref 70–99)
GLUCOSE BLDC GLUCOMTR-MCNC: 141 MG/DL (ref 70–99)
GLUCOSE BLDC GLUCOMTR-MCNC: 148 MG/DL (ref 70–99)
GLUCOSE BLDC GLUCOMTR-MCNC: 148 MG/DL (ref 70–99)
GLUCOSE BLDC GLUCOMTR-MCNC: 152 MG/DL (ref 70–99)
GLUCOSE BLDC GLUCOMTR-MCNC: 169 MG/DL (ref 70–99)
GLUCOSE BLDC GLUCOMTR-MCNC: 180 MG/DL (ref 70–99)
GLUCOSE BLDC GLUCOMTR-MCNC: 188 MG/DL (ref 70–99)
GLUCOSE BLDC GLUCOMTR-MCNC: 238 MG/DL (ref 70–99)
GLUCOSE BLDC GLUCOMTR-MCNC: 62 MG/DL (ref 70–99)
GLUCOSE BLDC GLUCOMTR-MCNC: 66 MG/DL (ref 70–99)
GLUCOSE BLDC GLUCOMTR-MCNC: 67 MG/DL (ref 70–99)
GLUCOSE BLDC GLUCOMTR-MCNC: 67 MG/DL (ref 70–99)
GLUCOSE BLDC GLUCOMTR-MCNC: 71 MG/DL (ref 70–99)
GLUCOSE BLDC GLUCOMTR-MCNC: 72 MG/DL (ref 70–99)
GLUCOSE BLDC GLUCOMTR-MCNC: 75 MG/DL (ref 70–99)
GLUCOSE BLDC GLUCOMTR-MCNC: 77 MG/DL (ref 70–99)
GLUCOSE BLDC GLUCOMTR-MCNC: 81 MG/DL (ref 70–99)
GLUCOSE BLDC GLUCOMTR-MCNC: 85 MG/DL (ref 70–99)
GLUCOSE BLDC GLUCOMTR-MCNC: 86 MG/DL (ref 70–99)
GLUCOSE BLDC GLUCOMTR-MCNC: 88 MG/DL (ref 70–99)
GLUCOSE BLDC GLUCOMTR-MCNC: 89 MG/DL (ref 70–99)
GLUCOSE BLDC GLUCOMTR-MCNC: 89 MG/DL (ref 70–99)
GLUCOSE BLDC GLUCOMTR-MCNC: 94 MG/DL (ref 70–99)
GLUCOSE BLDC GLUCOMTR-MCNC: 96 MG/DL (ref 70–99)
GLUCOSE SERPL-MCNC: 115 MG/DL (ref 70–99)
GLUCOSE SERPL-MCNC: 130 MG/DL (ref 70–99)
GLUCOSE SERPL-MCNC: 135 MG/DL (ref 70–99)
GLUCOSE SERPL-MCNC: 144 MG/DL (ref 70–99)
GLUCOSE SERPL-MCNC: 199 MG/DL (ref 70–99)
GLUCOSE SERPL-MCNC: 84 MG/DL (ref 70–99)
GLUCOSE SERPL-MCNC: 88 MG/DL (ref 70–99)
GLUCOSE UR STRIP-MCNC: NEGATIVE MG/DL
GRAN CASTS #/AREA URNS LPF: 12 /LPF
HCO3 BLD-SCNC: 13 MMOL/L (ref 21–28)
HCO3 BLD-SCNC: 14 MMOL/L (ref 21–28)
HCO3 BLD-SCNC: 14 MMOL/L (ref 21–28)
HCO3 BLD-SCNC: 15 MMOL/L (ref 21–28)
HCO3 BLD-SCNC: 16 MMOL/L (ref 21–28)
HCO3 BLD-SCNC: 16 MMOL/L (ref 21–28)
HCO3 BLD-SCNC: 22 MMOL/L (ref 21–28)
HCO3 BLD-SCNC: 26 MMOL/L (ref 21–28)
HCO3 BLDV-SCNC: 14 MMOL/L (ref 21–28)
HCO3 BLDV-SCNC: 17 MMOL/L (ref 21–28)
HCT VFR BLD AUTO: 34.8 % (ref 35–47)
HCT VFR BLD AUTO: 35.2 % (ref 35–47)
HGB BLD-MCNC: 11.8 G/DL (ref 11.7–15.7)
HGB BLD-MCNC: 11.9 G/DL (ref 11.7–15.7)
HGB UR QL STRIP: ABNORMAL
HYALINE CASTS #/AREA URNS LPF: 23 /LPF (ref 0–2)
INR PPP: 1.46 (ref 0.86–1.14)
KETONES UR STRIP-MCNC: NEGATIVE MG/DL
LACTATE BLD-SCNC: 2 MMOL/L (ref 0.7–2)
LACTATE BLD-SCNC: 2.7 MMOL/L (ref 0.7–2)
LACTATE BLD-SCNC: 3 MMOL/L (ref 0.7–2)
LACTATE BLD-SCNC: 3.1 MMOL/L (ref 0.7–2)
LACTATE BLD-SCNC: 3.3 MMOL/L (ref 0.7–2)
LACTATE BLD-SCNC: 3.5 MMOL/L (ref 0.7–2)
LACTATE BLD-SCNC: 4.3 MMOL/L (ref 0.7–2)
LACTATE BLD-SCNC: 4.7 MMOL/L (ref 0.7–2)
LACTATE BLD-SCNC: NORMAL MMOL/L (ref 0.7–2)
LEUKOCYTE ESTERASE UR QL STRIP: ABNORMAL
MAGNESIUM SERPL-MCNC: 2.1 MG/DL (ref 1.6–2.3)
MAGNESIUM SERPL-MCNC: 2.2 MG/DL (ref 1.6–2.3)
MCH RBC QN AUTO: 29.5 PG (ref 26.5–33)
MCH RBC QN AUTO: 29.6 PG (ref 26.5–33)
MCHC RBC AUTO-ENTMCNC: 33.8 G/DL (ref 31.5–36.5)
MCHC RBC AUTO-ENTMCNC: 33.9 G/DL (ref 31.5–36.5)
MCV RBC AUTO: 87 FL (ref 78–100)
MCV RBC AUTO: 87 FL (ref 78–100)
MRSA DNA SPEC QL NAA+PROBE: NEGATIVE
MUCOUS THREADS #/AREA URNS LPF: PRESENT /LPF
NITRATE UR QL: NEGATIVE
O2/TOTAL GAS SETTING VFR VENT: 7 %
O2/TOTAL GAS SETTING VFR VENT: 75 %
OXYHGB MFR BLDV: 80 %
PCO2 BLD: 27 MM HG (ref 35–45)
PCO2 BLD: 27 MM HG (ref 35–45)
PCO2 BLD: 29 MM HG (ref 35–45)
PCO2 BLD: 30 MM HG (ref 35–45)
PCO2 BLD: 37 MM HG (ref 35–45)
PCO2 BLD: 38 MM HG (ref 35–45)
PCO2 BLD: 45 MM HG (ref 35–45)
PCO2 BLD: 48 MM HG (ref 35–45)
PCO2 BLDV: 44 MM HG (ref 40–50)
PCO2 BLDV: 69 MM HG (ref 40–50)
PH BLD: 7.08 PH (ref 7.35–7.45)
PH BLD: 7.15 PH (ref 7.35–7.45)
PH BLD: 7.28 PH (ref 7.35–7.45)
PH BLD: 7.35 PH (ref 7.35–7.45)
PH BLD: 7.36 PH (ref 7.35–7.45)
PH BLD: 7.37 PH (ref 7.35–7.45)
PH BLD: 7.39 PH (ref 7.35–7.45)
PH BLD: 7.39 PH (ref 7.35–7.45)
PH BLDV: 7 PH (ref 7.32–7.43)
PH BLDV: 7.12 PH (ref 7.32–7.43)
PH UR STRIP: 6 PH (ref 5–7)
PHOSPHATE SERPL-MCNC: 1.4 MG/DL (ref 2.5–4.5)
PHOSPHATE SERPL-MCNC: 2.1 MG/DL (ref 2.5–4.5)
PHOSPHATE SERPL-MCNC: 2.1 MG/DL (ref 2.5–4.5)
PLATELET # BLD AUTO: 186 10E9/L (ref 150–450)
PLATELET # BLD AUTO: 197 10E9/L (ref 150–450)
PLATELET # BLD AUTO: 253 10E9/L (ref 150–450)
PO2 BLD: 104 MM HG (ref 80–105)
PO2 BLD: 122 MM HG (ref 80–105)
PO2 BLD: 139 MM HG (ref 80–105)
PO2 BLD: 146 MM HG (ref 80–105)
PO2 BLD: 63 MM HG (ref 80–105)
PO2 BLD: 63 MM HG (ref 80–105)
PO2 BLD: 68 MM HG (ref 80–105)
PO2 BLD: 77 MM HG (ref 80–105)
PO2 BLDV: 54 MM HG (ref 25–47)
PO2 BLDV: 56 MM HG (ref 25–47)
POTASSIUM SERPL-SCNC: 3.2 MMOL/L (ref 3.4–5.3)
POTASSIUM SERPL-SCNC: 3.6 MMOL/L (ref 3.4–5.3)
POTASSIUM SERPL-SCNC: 3.7 MMOL/L (ref 3.4–5.3)
POTASSIUM SERPL-SCNC: 3.7 MMOL/L (ref 3.4–5.3)
POTASSIUM SERPL-SCNC: 4.2 MMOL/L (ref 3.4–5.3)
POTASSIUM SERPL-SCNC: 4.2 MMOL/L (ref 3.4–5.3)
POTASSIUM SERPL-SCNC: 4.3 MMOL/L (ref 3.4–5.3)
PROCALCITONIN SERPL-MCNC: 0.19 NG/ML
PROT SERPL-MCNC: 3.8 G/DL (ref 6.8–8.8)
PROT SERPL-MCNC: 3.9 G/DL (ref 6.8–8.8)
PROT SERPL-MCNC: 4.4 G/DL (ref 6.8–8.8)
PROT SERPL-MCNC: 4.4 G/DL (ref 6.8–8.8)
PROT SERPL-MCNC: 4.5 G/DL (ref 6.8–8.8)
PROT SERPL-MCNC: 4.8 G/DL (ref 6.8–8.8)
RADIOLOGIST FLAGS: ABNORMAL
RBC # BLD AUTO: 3.98 10E12/L (ref 3.8–5.2)
RBC # BLD AUTO: 4.04 10E12/L (ref 3.8–5.2)
RBC #/AREA URNS AUTO: >182 /HPF (ref 0–2)
SODIUM SERPL-SCNC: 136 MMOL/L (ref 133–144)
SODIUM SERPL-SCNC: 137 MMOL/L (ref 133–144)
SODIUM SERPL-SCNC: 138 MMOL/L (ref 133–144)
SODIUM SERPL-SCNC: 140 MMOL/L (ref 133–144)
SODIUM SERPL-SCNC: 140 MMOL/L (ref 133–144)
SODIUM SERPL-SCNC: 143 MMOL/L (ref 133–144)
SODIUM SERPL-SCNC: 144 MMOL/L (ref 133–144)
SOURCE: ABNORMAL
SP GR UR STRIP: 1.03 (ref 1–1.03)
SPECIMEN SOURCE: NORMAL
UROBILINOGEN UR STRIP-MCNC: NORMAL MG/DL (ref 0–2)
WBC # BLD AUTO: 43.2 10E9/L (ref 4–11)
WBC # BLD AUTO: 45.1 10E9/L (ref 4–11)
WBC #/AREA URNS AUTO: 7 /HPF (ref 0–5)

## 2019-05-12 PROCEDURE — 83605 ASSAY OF LACTIC ACID: CPT | Performed by: STUDENT IN AN ORGANIZED HEALTH CARE EDUCATION/TRAINING PROGRAM

## 2019-05-12 PROCEDURE — 5A1955Z RESPIRATORY VENTILATION, GREATER THAN 96 CONSECUTIVE HOURS: ICD-10-PCS | Performed by: INTERNAL MEDICINE

## 2019-05-12 PROCEDURE — 25000125 ZZHC RX 250: Performed by: ANESTHESIOLOGY

## 2019-05-12 PROCEDURE — 87640 STAPH A DNA AMP PROBE: CPT | Performed by: STUDENT IN AN ORGANIZED HEALTH CARE EDUCATION/TRAINING PROGRAM

## 2019-05-12 PROCEDURE — P9041 ALBUMIN (HUMAN),5%, 50ML: HCPCS | Performed by: ANESTHESIOLOGY

## 2019-05-12 PROCEDURE — 40000986 XR ABDOMEN PORT 1 VW

## 2019-05-12 PROCEDURE — 82533 TOTAL CORTISOL: CPT

## 2019-05-12 PROCEDURE — 40000275 ZZH STATISTIC RCP TIME EA 10 MIN

## 2019-05-12 PROCEDURE — 40000281 ZZH STATISTIC TRANSPORT TIME EA 15 MIN

## 2019-05-12 PROCEDURE — 82803 BLOOD GASES ANY COMBINATION: CPT | Performed by: STUDENT IN AN ORGANIZED HEALTH CARE EDUCATION/TRAINING PROGRAM

## 2019-05-12 PROCEDURE — 70450 CT HEAD/BRAIN W/O DYE: CPT

## 2019-05-12 PROCEDURE — 82330 ASSAY OF CALCIUM: CPT | Performed by: STUDENT IN AN ORGANIZED HEALTH CARE EDUCATION/TRAINING PROGRAM

## 2019-05-12 PROCEDURE — P9041 ALBUMIN (HUMAN),5%, 50ML: HCPCS | Performed by: STUDENT IN AN ORGANIZED HEALTH CARE EDUCATION/TRAINING PROGRAM

## 2019-05-12 PROCEDURE — 85049 AUTOMATED PLATELET COUNT: CPT | Performed by: STUDENT IN AN ORGANIZED HEALTH CARE EDUCATION/TRAINING PROGRAM

## 2019-05-12 PROCEDURE — 25000128 H RX IP 250 OP 636

## 2019-05-12 PROCEDURE — 25000128 H RX IP 250 OP 636: Performed by: ANESTHESIOLOGY

## 2019-05-12 PROCEDURE — 99291 CRITICAL CARE FIRST HOUR: CPT | Mod: 25 | Performed by: ANESTHESIOLOGY

## 2019-05-12 PROCEDURE — 80053 COMPREHEN METABOLIC PANEL: CPT | Performed by: STUDENT IN AN ORGANIZED HEALTH CARE EDUCATION/TRAINING PROGRAM

## 2019-05-12 PROCEDURE — 93005 ELECTROCARDIOGRAM TRACING: CPT

## 2019-05-12 PROCEDURE — 87641 MR-STAPH DNA AMP PROBE: CPT | Performed by: STUDENT IN AN ORGANIZED HEALTH CARE EDUCATION/TRAINING PROGRAM

## 2019-05-12 PROCEDURE — 25000132 ZZH RX MED GY IP 250 OP 250 PS 637: Performed by: STUDENT IN AN ORGANIZED HEALTH CARE EDUCATION/TRAINING PROGRAM

## 2019-05-12 PROCEDURE — 80307 DRUG TEST PRSMV CHEM ANLYZR: CPT

## 2019-05-12 PROCEDURE — 25800030 ZZH RX IP 258 OP 636: Performed by: ANESTHESIOLOGY

## 2019-05-12 PROCEDURE — 25000125 ZZHC RX 250: Performed by: STUDENT IN AN ORGANIZED HEALTH CARE EDUCATION/TRAINING PROGRAM

## 2019-05-12 PROCEDURE — 80053 COMPREHEN METABOLIC PANEL: CPT

## 2019-05-12 PROCEDURE — 82550 ASSAY OF CK (CPK): CPT

## 2019-05-12 PROCEDURE — 40000986 XR CHEST PORT 1 VW

## 2019-05-12 PROCEDURE — 82803 BLOOD GASES ANY COMBINATION: CPT

## 2019-05-12 PROCEDURE — 81001 URINALYSIS AUTO W/SCOPE: CPT | Performed by: INTERNAL MEDICINE

## 2019-05-12 PROCEDURE — 00000146 ZZHCL STATISTIC GLUCOSE BY METER IP

## 2019-05-12 PROCEDURE — 80048 BASIC METABOLIC PNL TOTAL CA: CPT | Performed by: STUDENT IN AN ORGANIZED HEALTH CARE EDUCATION/TRAINING PROGRAM

## 2019-05-12 PROCEDURE — 84100 ASSAY OF PHOSPHORUS: CPT

## 2019-05-12 PROCEDURE — 84100 ASSAY OF PHOSPHORUS: CPT | Performed by: STUDENT IN AN ORGANIZED HEALTH CARE EDUCATION/TRAINING PROGRAM

## 2019-05-12 PROCEDURE — 25800025 ZZH RX 258: Performed by: ANESTHESIOLOGY

## 2019-05-12 PROCEDURE — 36620 INSERTION CATHETER ARTERY: CPT | Mod: GC | Performed by: ANESTHESIOLOGY

## 2019-05-12 PROCEDURE — 20000004 ZZH R&B ICU UMMC

## 2019-05-12 PROCEDURE — 94660 CPAP INITIATION&MGMT: CPT

## 2019-05-12 PROCEDURE — 25000128 H RX IP 250 OP 636: Performed by: STUDENT IN AN ORGANIZED HEALTH CARE EDUCATION/TRAINING PROGRAM

## 2019-05-12 PROCEDURE — 85610 PROTHROMBIN TIME: CPT | Performed by: STUDENT IN AN ORGANIZED HEALTH CARE EDUCATION/TRAINING PROGRAM

## 2019-05-12 PROCEDURE — 40000556 ZZH STATISTIC PERIPHERAL IV START W US GUIDANCE

## 2019-05-12 PROCEDURE — 93010 ELECTROCARDIOGRAM REPORT: CPT | Performed by: INTERNAL MEDICINE

## 2019-05-12 PROCEDURE — 25800030 ZZH RX IP 258 OP 636: Performed by: STUDENT IN AN ORGANIZED HEALTH CARE EDUCATION/TRAINING PROGRAM

## 2019-05-12 PROCEDURE — 94002 VENT MGMT INPAT INIT DAY: CPT

## 2019-05-12 PROCEDURE — 71045 X-RAY EXAM CHEST 1 VIEW: CPT

## 2019-05-12 PROCEDURE — 82805 BLOOD GASES W/O2 SATURATION: CPT | Performed by: STUDENT IN AN ORGANIZED HEALTH CARE EDUCATION/TRAINING PROGRAM

## 2019-05-12 PROCEDURE — 83735 ASSAY OF MAGNESIUM: CPT | Performed by: STUDENT IN AN ORGANIZED HEALTH CARE EDUCATION/TRAINING PROGRAM

## 2019-05-12 PROCEDURE — 82330 ASSAY OF CALCIUM: CPT

## 2019-05-12 PROCEDURE — 83605 ASSAY OF LACTIC ACID: CPT

## 2019-05-12 PROCEDURE — 95951 ZZHC EEG VIDEO < 12 HR: CPT | Mod: 52,ZF

## 2019-05-12 PROCEDURE — 3E043XZ INTRODUCTION OF VASOPRESSOR INTO CENTRAL VEIN, PERCUTANEOUS APPROACH: ICD-10-PCS | Performed by: INTERNAL MEDICINE

## 2019-05-12 PROCEDURE — 76882 US LMTD JT/FCL EVL NVASC XTR: CPT | Mod: RT

## 2019-05-12 PROCEDURE — 93306 TTE W/DOPPLER COMPLETE: CPT | Mod: 26 | Performed by: INTERNAL MEDICINE

## 2019-05-12 PROCEDURE — 40000671 ZZH STATISTIC ANESTHESIA CASE

## 2019-05-12 PROCEDURE — 36556 INSERT NON-TUNNEL CV CATH: CPT | Mod: GC | Performed by: ANESTHESIOLOGY

## 2019-05-12 PROCEDURE — 85027 COMPLETE CBC AUTOMATED: CPT | Performed by: STUDENT IN AN ORGANIZED HEALTH CARE EDUCATION/TRAINING PROGRAM

## 2019-05-12 PROCEDURE — 25000125 ZZHC RX 250

## 2019-05-12 PROCEDURE — 93306 TTE W/DOPPLER COMPLETE: CPT

## 2019-05-12 PROCEDURE — 87086 URINE CULTURE/COLONY COUNT: CPT | Performed by: ANESTHESIOLOGY

## 2019-05-12 PROCEDURE — 25000131 ZZH RX MED GY IP 250 OP 636 PS 637: Performed by: STUDENT IN AN ORGANIZED HEALTH CARE EDUCATION/TRAINING PROGRAM

## 2019-05-12 PROCEDURE — 74176 CT ABD & PELVIS W/O CONTRAST: CPT

## 2019-05-12 PROCEDURE — 99292 CRITICAL CARE ADDL 30 MIN: CPT | Mod: 25 | Performed by: ANESTHESIOLOGY

## 2019-05-12 PROCEDURE — XW043H4 INTRODUCTION OF SYNTHETIC HUMAN ANGIOTENSIN II INTO CENTRAL VEIN, PERCUTANEOUS APPROACH, NEW TECHNOLOGY GROUP 4: ICD-10-PCS | Performed by: INTERNAL MEDICINE

## 2019-05-12 PROCEDURE — 25800025 ZZH RX 258

## 2019-05-12 RX ORDER — MIDAZOLAM (PF) 1 MG/ML IN 0.9 % SODIUM CHLORIDE INTRAVENOUS SOLUTION
2-12 CONTINUOUS
Status: DISCONTINUED | OUTPATIENT
Start: 2019-05-12 | End: 2019-05-22

## 2019-05-12 RX ORDER — LORAZEPAM 2 MG/ML
1-2 INJECTION INTRAMUSCULAR ONCE
Status: COMPLETED | OUTPATIENT
Start: 2019-05-12 | End: 2019-05-12

## 2019-05-12 RX ORDER — HEPARIN SODIUM 5000 [USP'U]/.5ML
5000 INJECTION, SOLUTION INTRAVENOUS; SUBCUTANEOUS EVERY 12 HOURS
Status: DISCONTINUED | OUTPATIENT
Start: 2019-05-12 | End: 2019-05-14

## 2019-05-12 RX ORDER — NALOXONE HYDROCHLORIDE 1 MG/ML
5 INJECTION INTRAMUSCULAR; INTRAVENOUS; SUBCUTANEOUS ONCE
Status: COMPLETED | OUTPATIENT
Start: 2019-05-12 | End: 2019-05-12

## 2019-05-12 RX ORDER — BISACODYL 10 MG
10 SUPPOSITORY, RECTAL RECTAL DAILY PRN
Status: DISCONTINUED | OUTPATIENT
Start: 2019-05-12 | End: 2019-05-29 | Stop reason: HOSPADM

## 2019-05-12 RX ORDER — NALOXONE HYDROCHLORIDE 0.4 MG/ML
5 INJECTION, SOLUTION INTRAMUSCULAR; INTRAVENOUS; SUBCUTANEOUS
Status: DISCONTINUED | OUTPATIENT
Start: 2019-05-12 | End: 2019-05-12 | Stop reason: DRUGHIGH

## 2019-05-12 RX ORDER — CALCIUM CHLORIDE 100 MG/ML
2 INJECTION INTRAVENOUS; INTRAVENTRICULAR ONCE
Status: COMPLETED | OUTPATIENT
Start: 2019-05-12 | End: 2019-05-12

## 2019-05-12 RX ORDER — OLANZAPINE 10 MG/2ML
5 INJECTION, POWDER, FOR SOLUTION INTRAMUSCULAR ONCE
Status: COMPLETED | OUTPATIENT
Start: 2019-05-12 | End: 2019-05-12

## 2019-05-12 RX ORDER — LEVOTHYROXINE SODIUM 25 UG/1
50 TABLET ORAL DAILY
Status: DISCONTINUED | OUTPATIENT
Start: 2019-05-12 | End: 2019-05-29 | Stop reason: HOSPADM

## 2019-05-12 RX ORDER — DEXMEDETOMIDINE HYDROCHLORIDE 4 UG/ML
.2-.7 INJECTION, SOLUTION INTRAVENOUS CONTINUOUS
Status: DISCONTINUED | OUTPATIENT
Start: 2019-05-12 | End: 2019-05-12

## 2019-05-12 RX ORDER — LORAZEPAM 2 MG/ML
2 INJECTION INTRAMUSCULAR ONCE
Status: DISCONTINUED | OUTPATIENT
Start: 2019-05-12 | End: 2019-05-12

## 2019-05-12 RX ORDER — POTASSIUM CHLORIDE 750 MG/1
40 TABLET, EXTENDED RELEASE ORAL
Status: DISPENSED | OUTPATIENT
Start: 2019-05-12 | End: 2019-05-12

## 2019-05-12 RX ORDER — NICOTINE POLACRILEX 4 MG
15-30 LOZENGE BUCCAL
Status: DISCONTINUED | OUTPATIENT
Start: 2019-05-12 | End: 2019-05-29 | Stop reason: HOSPADM

## 2019-05-12 RX ORDER — NALOXONE HYDROCHLORIDE 0.4 MG/ML
5 INJECTION, SOLUTION INTRAMUSCULAR; INTRAVENOUS; SUBCUTANEOUS ONCE
Status: DISCONTINUED | OUTPATIENT
Start: 2019-05-12 | End: 2019-05-12

## 2019-05-12 RX ORDER — HALOPERIDOL 5 MG/ML
2 INJECTION INTRAMUSCULAR
Status: COMPLETED | OUTPATIENT
Start: 2019-05-12 | End: 2019-05-12

## 2019-05-12 RX ORDER — OLANZAPINE 10 MG/2ML
10 INJECTION, POWDER, FOR SOLUTION INTRAMUSCULAR ONCE
Status: DISCONTINUED | OUTPATIENT
Start: 2019-05-12 | End: 2019-05-12

## 2019-05-12 RX ORDER — ALBUMIN, HUMAN INJ 5% 5 %
50 SOLUTION INTRAVENOUS ONCE
Status: COMPLETED | OUTPATIENT
Start: 2019-05-12 | End: 2019-05-12

## 2019-05-12 RX ORDER — ACETAMINOPHEN 325 MG/1
975 TABLET ORAL EVERY 8 HOURS PRN
Status: DISCONTINUED | OUTPATIENT
Start: 2019-05-12 | End: 2019-05-29 | Stop reason: HOSPADM

## 2019-05-12 RX ORDER — HALOPERIDOL 5 MG/ML
2 INJECTION INTRAMUSCULAR ONCE
Status: DISCONTINUED | OUTPATIENT
Start: 2019-05-12 | End: 2019-05-12

## 2019-05-12 RX ORDER — FENTANYL CITRATE 50 UG/ML
50-100 INJECTION, SOLUTION INTRAMUSCULAR; INTRAVENOUS
Status: DISCONTINUED | OUTPATIENT
Start: 2019-05-12 | End: 2019-05-20

## 2019-05-12 RX ORDER — ETOMIDATE 2 MG/ML
INJECTION INTRAVENOUS PRN
Status: DISCONTINUED | OUTPATIENT
Start: 2019-05-12 | End: 2019-05-12

## 2019-05-12 RX ORDER — DEXTROSE 50 G/100ML
INJECTION, SOLUTION INTRAVENOUS CONTINUOUS
Status: DISCONTINUED | OUTPATIENT
Start: 2019-05-12 | End: 2019-05-12 | Stop reason: CLARIF

## 2019-05-12 RX ORDER — LORAZEPAM 2 MG/ML
2 INJECTION INTRAMUSCULAR ONCE
Status: COMPLETED | OUTPATIENT
Start: 2019-05-12 | End: 2019-05-12

## 2019-05-12 RX ORDER — ALBUMIN, HUMAN INJ 5% 5 %
50 SOLUTION INTRAVENOUS ONCE
Status: DISCONTINUED | OUTPATIENT
Start: 2019-05-12 | End: 2019-05-12

## 2019-05-12 RX ORDER — DEXTROSE MONOHYDRATE 25 G/50ML
25-50 INJECTION, SOLUTION INTRAVENOUS
Status: DISCONTINUED | OUTPATIENT
Start: 2019-05-12 | End: 2019-05-29 | Stop reason: HOSPADM

## 2019-05-12 RX ORDER — CALCIUM CHLORIDE 100 MG/ML
INJECTION INTRAVENOUS; INTRAVENTRICULAR
Status: DISCONTINUED
Start: 2019-05-12 | End: 2019-05-12 | Stop reason: HOSPADM

## 2019-05-12 RX ORDER — CLONAZEPAM 0.5 MG/1
1 TABLET ORAL 3 TIMES DAILY PRN
Status: DISCONTINUED | OUTPATIENT
Start: 2019-05-12 | End: 2019-05-12

## 2019-05-12 RX ORDER — CALCIUM CHLORIDE 100 MG/ML
1 INJECTION INTRAVENOUS; INTRAVENTRICULAR ONCE
Status: COMPLETED | OUTPATIENT
Start: 2019-05-12 | End: 2019-05-12

## 2019-05-12 RX ORDER — DOPAMINE HYDROCHLORIDE 160 MG/100ML
2-20 INJECTION, SOLUTION INTRAVENOUS CONTINUOUS
Status: DISCONTINUED | OUTPATIENT
Start: 2019-05-12 | End: 2019-05-13

## 2019-05-12 RX ORDER — DEXTROSE 50 G/100ML
INJECTION, SOLUTION INTRAVENOUS CONTINUOUS
Status: DISCONTINUED | OUTPATIENT
Start: 2019-05-12 | End: 2019-05-12 | Stop reason: RX

## 2019-05-12 RX ORDER — ETOMIDATE 2 MG/ML
INJECTION INTRAVENOUS
Status: COMPLETED
Start: 2019-05-12 | End: 2019-05-12

## 2019-05-12 RX ORDER — LORAZEPAM 2 MG/ML
INJECTION INTRAMUSCULAR
Status: DISCONTINUED
Start: 2019-05-12 | End: 2019-05-12 | Stop reason: HOSPADM

## 2019-05-12 RX ORDER — HYDROXYZINE HYDROCHLORIDE 25 MG/1
100 TABLET, FILM COATED ORAL 2 TIMES DAILY PRN
Status: DISCONTINUED | OUTPATIENT
Start: 2019-05-12 | End: 2019-05-12

## 2019-05-12 RX ORDER — ALBUMIN, HUMAN INJ 5% 5 %
25 SOLUTION INTRAVENOUS ONCE
Status: COMPLETED | OUTPATIENT
Start: 2019-05-12 | End: 2019-05-12

## 2019-05-12 RX ORDER — NALOXONE HYDROCHLORIDE 0.4 MG/ML
.1-.4 INJECTION, SOLUTION INTRAMUSCULAR; INTRAVENOUS; SUBCUTANEOUS
Status: DISCONTINUED | OUTPATIENT
Start: 2019-05-12 | End: 2019-05-12

## 2019-05-12 RX ORDER — NALOXONE HYDROCHLORIDE 0.4 MG/ML
.1-.4 INJECTION, SOLUTION INTRAMUSCULAR; INTRAVENOUS; SUBCUTANEOUS
Status: DISCONTINUED | OUTPATIENT
Start: 2019-05-12 | End: 2019-05-29 | Stop reason: HOSPADM

## 2019-05-12 RX ORDER — OLANZAPINE 10 MG/2ML
5 INJECTION, POWDER, FOR SOLUTION INTRAMUSCULAR
Status: DISCONTINUED | OUTPATIENT
Start: 2019-05-12 | End: 2019-05-12

## 2019-05-12 RX ADMIN — DOPAMINE HYDROCHLORIDE IN DEXTROSE 2 MCG/KG/MIN: 1.6 INJECTION, SOLUTION INTRAVENOUS at 08:56

## 2019-05-12 RX ADMIN — SODIUM BICARBONATE 50 MEQ/HR: 84 INJECTION, SOLUTION INTRAVENOUS at 13:49

## 2019-05-12 RX ADMIN — DEXTROSE MONOHYDRATE: 70 INJECTION, SOLUTION INTRAVENOUS at 01:45

## 2019-05-12 RX ADMIN — HALOPERIDOL LACTATE 2 MG: 5 INJECTION INTRAMUSCULAR at 04:55

## 2019-05-12 RX ADMIN — SODIUM CHLORIDE 10 UNITS/KG/HR: 9 INJECTION, SOLUTION INTRAVENOUS at 11:22

## 2019-05-12 RX ADMIN — DEXTROSE MONOHYDRATE 25 ML: 25 INJECTION, SOLUTION INTRAVENOUS at 06:30

## 2019-05-12 RX ADMIN — VASOPRESSIN 2.4 UNITS/HR: 20 INJECTION INTRAVENOUS at 23:54

## 2019-05-12 RX ADMIN — SODIUM CHLORIDE 10 UNITS/KG/HR: 9 INJECTION, SOLUTION INTRAVENOUS at 09:07

## 2019-05-12 RX ADMIN — HEPARIN SODIUM 5000 UNITS: 5000 INJECTION, SOLUTION INTRAVENOUS; SUBCUTANEOUS at 16:31

## 2019-05-12 RX ADMIN — CALCIUM CHLORIDE 2 G: 100 INJECTION, SOLUTION INTRAVENOUS; INTRAVENTRICULAR at 01:26

## 2019-05-12 RX ADMIN — MIDAZOLAM 15 MG/HR: 5 INJECTION INTRAMUSCULAR; INTRAVENOUS at 20:08

## 2019-05-12 RX ADMIN — SODIUM CHLORIDE 50 UNITS/KG/HR: 9 INJECTION, SOLUTION INTRAVENOUS at 07:02

## 2019-05-12 RX ADMIN — MIDAZOLAM 1 MG/HR: 5 INJECTION INTRAMUSCULAR; INTRAVENOUS at 06:13

## 2019-05-12 RX ADMIN — EPINEPHRINE 2 MCG/KG/MIN: 1 INJECTION PARENTERAL at 06:49

## 2019-05-12 RX ADMIN — EPINEPHRINE 2 MCG/KG/MIN: 1 INJECTION PARENTERAL at 07:53

## 2019-05-12 RX ADMIN — LORAZEPAM 1 MG: 2 INJECTION, SOLUTION INTRAMUSCULAR; INTRAVENOUS at 04:19

## 2019-05-12 RX ADMIN — NALOXONE HYDROCHLORIDE 0.25 MCG/KG/HR: 1 INJECTION PARENTERAL at 00:35

## 2019-05-12 RX ADMIN — ALBUMIN HUMAN 50 G: 0.05 INJECTION, SOLUTION INTRAVENOUS at 21:19

## 2019-05-12 RX ADMIN — SODIUM CHLORIDE 10 UNITS/KG/HR: 9 INJECTION, SOLUTION INTRAVENOUS at 19:50

## 2019-05-12 RX ADMIN — SODIUM CHLORIDE 10 UNITS/KG/HR: 9 INJECTION, SOLUTION INTRAVENOUS at 17:58

## 2019-05-12 RX ADMIN — SODIUM CHLORIDE 9 UNITS/KG/HR: 9 INJECTION, SOLUTION INTRAVENOUS at 02:57

## 2019-05-12 RX ADMIN — SODIUM CHLORIDE, POTASSIUM CHLORIDE, SODIUM LACTATE AND CALCIUM CHLORIDE 1000 ML: 600; 310; 30; 20 INJECTION, SOLUTION INTRAVENOUS at 00:49

## 2019-05-12 RX ADMIN — EPINEPHRINE 1.9 MCG/KG/MIN: 1 INJECTION PARENTERAL at 08:57

## 2019-05-12 RX ADMIN — EPINEPHRINE 0.8 MCG/KG/MIN: 1 INJECTION PARENTERAL at 03:02

## 2019-05-12 RX ADMIN — EPINEPHRINE 0.8 MCG/KG/MIN: 1 INJECTION PARENTERAL at 00:52

## 2019-05-12 RX ADMIN — MIDAZOLAM 15 MG/HR: 5 INJECTION INTRAMUSCULAR; INTRAVENOUS at 14:34

## 2019-05-12 RX ADMIN — PHENYLEPHRINE HYDROCHLORIDE 0.5 MCG/KG/MIN: 10 INJECTION, SOLUTION INTRAMUSCULAR; INTRAVENOUS; SUBCUTANEOUS at 10:46

## 2019-05-12 RX ADMIN — SODIUM BICARBONATE 50 MEQ: 84 INJECTION, SOLUTION INTRAVENOUS at 11:40

## 2019-05-12 RX ADMIN — CEFEPIME 2 G: 2 INJECTION, POWDER, FOR SOLUTION INTRAVENOUS at 03:04

## 2019-05-12 RX ADMIN — EPINEPHRINE 2 MCG/KG/MIN: 1 INJECTION PARENTERAL at 14:35

## 2019-05-12 RX ADMIN — LORAZEPAM 2 MG: 2 INJECTION, SOLUTION INTRAMUSCULAR; INTRAVENOUS at 05:16

## 2019-05-12 RX ADMIN — ETOMIDATE 20 MG: 2 INJECTION INTRAVENOUS at 05:46

## 2019-05-12 RX ADMIN — ALBUMIN HUMAN 25 G: 0.05 INJECTION, SOLUTION INTRAVENOUS at 03:47

## 2019-05-12 RX ADMIN — CALCIUM CHLORIDE 1 G: 100 INJECTION INTRAVENOUS; INTRAVENTRICULAR at 08:02

## 2019-05-12 RX ADMIN — SODIUM CHLORIDE 10 UNITS/KG/HR: 9 INJECTION, SOLUTION INTRAVENOUS at 23:57

## 2019-05-12 RX ADMIN — METHYLENE BLUE 0.5 MG/KG/HR: 10 INJECTION INTRAVENOUS at 19:29

## 2019-05-12 RX ADMIN — DEXTROSE MONOHYDRATE 25 ML: 25 INJECTION, SOLUTION INTRAVENOUS at 03:10

## 2019-05-12 RX ADMIN — NALOXONE HYDROCHLORIDE 5 MG: 1 INJECTION PARENTERAL at 01:29

## 2019-05-12 RX ADMIN — DOPAMINE HYDROCHLORIDE IN DEXTROSE 20 MCG/KG/MIN: 1.6 INJECTION, SOLUTION INTRAVENOUS at 16:17

## 2019-05-12 RX ADMIN — NALOXONE HYDROCHLORIDE 5 MG: 1 INJECTION PARENTERAL at 01:12

## 2019-05-12 RX ADMIN — SODIUM CHLORIDE 10 UNITS/KG/HR: 9 INJECTION, SOLUTION INTRAVENOUS at 21:47

## 2019-05-12 RX ADMIN — ROCURONIUM BROMIDE 100 MG: 10 INJECTION INTRAVENOUS at 05:46

## 2019-05-12 RX ADMIN — NALOXONE HYDROCHLORIDE 5 MG: 1 INJECTION PARENTERAL at 01:27

## 2019-05-12 RX ADMIN — Medication 25 MCG/HR: at 06:12

## 2019-05-12 RX ADMIN — EPINEPHRINE 2 MCG/KG/MIN: 1 INJECTION PARENTERAL at 11:39

## 2019-05-12 RX ADMIN — POTASSIUM CHLORIDE 40 MEQ: 750 TABLET, EXTENDED RELEASE ORAL at 03:09

## 2019-05-12 RX ADMIN — NALOXONE HYDROCHLORIDE 0.25 MCG/KG/HR: 1 INJECTION PARENTERAL at 00:49

## 2019-05-12 RX ADMIN — METHYLENE BLUE 100 MG: 10 INJECTION INTRAVENOUS at 19:09

## 2019-05-12 RX ADMIN — DEXTROSE MONOHYDRATE 25 G: 25 INJECTION, SOLUTION INTRAVENOUS at 01:21

## 2019-05-12 RX ADMIN — OLANZAPINE 5 MG: 10 INJECTION, POWDER, FOR SOLUTION INTRAMUSCULAR at 05:12

## 2019-05-12 RX ADMIN — Medication 0.65 MCG/KG/MIN: at 05:15

## 2019-05-12 RX ADMIN — NALOXONE HYDROCHLORIDE: 1 INJECTION PARENTERAL at 02:16

## 2019-05-12 RX ADMIN — EPINEPHRINE 2 MCG/KG/MIN: 1 INJECTION PARENTERAL at 05:24

## 2019-05-12 RX ADMIN — SODIUM CHLORIDE 2 UNITS/KG/HR: 9 INJECTION, SOLUTION INTRAVENOUS at 00:36

## 2019-05-12 RX ADMIN — ANGIOTENSIN II 20 NG/KG/MIN: 2.5 INJECTION INTRAVENOUS at 13:47

## 2019-05-12 RX ADMIN — HYDROXYZINE HYDROCHLORIDE 100 MG: 25 TABLET ORAL at 03:09

## 2019-05-12 RX ADMIN — CLONAZEPAM 1 MG: 0.5 TABLET ORAL at 03:10

## 2019-05-12 RX ADMIN — Medication 0.65 MCG/KG/MIN: at 20:48

## 2019-05-12 RX ADMIN — DEXTROSE MONOHYDRATE 50 ML: 25 INJECTION, SOLUTION INTRAVENOUS at 04:11

## 2019-05-12 RX ADMIN — SODIUM BICARBONATE 50 MEQ: 84 INJECTION, SOLUTION INTRAVENOUS at 11:19

## 2019-05-12 RX ADMIN — I.V. FAT EMULSION 75 ML: 20 EMULSION INTRAVENOUS at 08:12

## 2019-05-12 RX ADMIN — MAGNESIUM SULFATE HEPTAHYDRATE 4 G: 40 INJECTION, SOLUTION INTRAVENOUS at 00:50

## 2019-05-12 RX ADMIN — METHYLENE BLUE 1.75 MG/KG/HR: 10 INJECTION INTRAVENOUS at 23:50

## 2019-05-12 RX ADMIN — VASOPRESSIN 2.4 UNITS/HR: 20 INJECTION INTRAVENOUS at 00:43

## 2019-05-12 RX ADMIN — Medication 10 MG: at 05:42

## 2019-05-12 RX ADMIN — SODIUM CHLORIDE 1000 ML: 9 INJECTION, SOLUTION INTRAVENOUS at 20:10

## 2019-05-12 RX ADMIN — VASOPRESSIN 2.4 UNITS/HR: 20 INJECTION INTRAVENOUS at 07:50

## 2019-05-12 RX ADMIN — I.V. FAT EMULSION 175 ML: 20 EMULSION INTRAVENOUS at 08:28

## 2019-05-12 RX ADMIN — SODIUM CHLORIDE 10 UNITS/KG/HR: 9 INJECTION, SOLUTION INTRAVENOUS at 05:23

## 2019-05-12 RX ADMIN — VASOPRESSIN 2.4 UNITS/HR: 20 INJECTION INTRAVENOUS at 00:00

## 2019-05-12 RX ADMIN — METHYLENE BLUE 1.5 MG/KG/HR: 10 INJECTION INTRAVENOUS at 22:20

## 2019-05-12 ASSESSMENT — ACTIVITIES OF DAILY LIVING (ADL)
ADLS_ACUITY_SCORE: 16
ADLS_ACUITY_SCORE: 20
ADLS_ACUITY_SCORE: 15
ADLS_ACUITY_SCORE: 18
ADLS_ACUITY_SCORE: 16
ADLS_ACUITY_SCORE: 15

## 2019-05-12 NOTE — PROGRESS NOTES
Neuro: Pt became very anxious and combative. Pulling at lines and attempting to get out of bed. Attempted to william and reorient pt w/o success. Zyprexa, Ativan, Haldol administered w/o effect.   Cardiac: NSR w/ rare ectopy. HR in the 60's. Hypotensive w/ MAPs in the 50's. Levo infusing at 0.65, Epi at 2 and Vaso at 2.4. Afebrile.  Respiratory: Intubated at 0600. /500/12/10. Sats in the 90's. LS clear.  : BS hypoactive and faint. C/o chronic constipation. I  GI: Napoles placed for urinary retention and strict I&O's. UO decreasing throughout the night.   Insulin gtt continued at 10 units/kg/hr. Narcan at 15 mg/hr. D50 at 50/hr.

## 2019-05-12 NOTE — H&P
MICU History & Physical   Ana Laura Wharton ([id number]) admitted on 5/11/2019  Primary care provider: Liborio Lincoln    Chief Complaint:   No chief complaint on file.    History of Present Illness:   Ana Laura Wharton is a 48 year old female with a PMHx significant for bipolar I d/o, anxiety, depression, recurrent UTIs, and HTN who is being transferred from [facility] ED for medication overdose as a suicide attempt.     The patient reports taking handfuls of clonidine 0.1mg and Coreg 6.25mg on 5/11 at 4am and then every few hours afterwards (last at 7pm on 5/11) in an attempt to kill herself, she estimates in total it was 180 tabs of each. She reported taking some of her other medications as well but states she took them as prescribed (has citalopram, Zyprex, Flexural, clonazepam, eletriptan, mirtazapine, tolterodine, oxybutynin, Atarax, Synthroid, Tylenol, ibuprofen, and a statin on her PTA med list). She called her friend on the evening of 5/11 who brought her to the ED. Per her friend she had also been binge drinking this past week.     In the ED, the patient was afebrile, on RA, HRs in the 60s, BPs  60s-70s/40s w/ MAPs in the 40s and 50s despite a norepi and epi gtts. RRs increased to the 40s- and 50s. The patient was reportedly awake w/ slurred speech, c/o abd pain. Labs were significant for lactate of 3.6 which trended to 2.9 w/ 2L NS, lipase of 1,100, AST of 54, WBC od 23.8 w/ L shift, and VBG w/ pH 7.36 and pCO2 of 52. Kidney function and free T4 was wnl, trop, UDS, ethanol, tylenol, and salicylate levels were wnl. CXR was negative for infiltrates, UA was not convincing for infection, CT abd/pelvis w/ con showed large stool burden, ileus but could not r/o SBO, and a bilobed low-density lesion along the gastric fundus (unchanged from 7/2018), duplication cyst vs neoplasm. There was no inflammation of the pancreas. Per chart review, bedside echo was unremarkable.     Femoral arterial and CVC lines were  placed. She was given 2L NS, and started on insulin, glucagon (glucagon 1mg IV x2 was given prior to the gtt), and D10 gtts, She was given calcium chloride 1g, She was also given vanc, cefeprime, and Flagyl to cover an intraabdominal source of sepsis. She was then transferred to the MICU here.     Past Medical History:   Past Medical History:   Diagnosis Date     Abnormal Pap smear of cervix 01/09/2019    see problem list     Anxiety      Bipolar disorder (H)      C. difficile colitis      Cervical high risk HPV (human papillomavirus) test positive 01/09/2019    see problem list     Depressive disorder      Essential hypertension 7/8/2015     Gastro-oesophageal reflux disease      Hypothyroidism 4/1/2015     Migraine      Spontaneous pneumothorax     x3, resolved w/ pleurodesis      Suicide attempt (H)        Past Surgical History:   Past Surgical History:   Procedure Laterality Date     ARTHROSCOPY KNEE      L knee     CERVIX SURGERY      for pre-cancerous changes     left knee surgery       ORTHOPEDIC SURGERY      L shoulder     THORACIC SURGERY      for pneumothorax, pleurodesis and lobe resection     Medications (prior to admission):  Medications Prior to Admission   Medication Sig Dispense Refill Last Dose     acetaminophen (TYLENOL) 325 MG tablet Take 3 tablets (975 mg) by mouth every 8 hours as needed for mild pain or fever 30 tablet 0 Taking     albuterol (PROAIR HFA/PROVENTIL HFA/VENTOLIN HFA) 108 (90 Base) MCG/ACT inhaler Inhale 2 puffs into the lungs every 6 hours as needed for shortness of breath / dyspnea or wheezing 1 Inhaler 0 Taking     atorvastatin (LIPITOR) 10 MG tablet Take 1 tablet (10 mg) by mouth daily 90 tablet 3 Taking     carvedilol (COREG) 6.25 MG tablet Take 1 tablet (6.25 mg) by mouth 2 times daily (with meals) 180 tablet 3 Taking     ciprofloxacin (CIPRO) 250 MG tablet Take 0.5 tablets (125 mg) by mouth daily For UTI prevention 45 tablet 3 Taking     citalopram (CELEXA) 20 MG tablet Take  1 tablet (20 mg) by mouth daily 30 tablet 3      clindamycin (CLINDAGEL) 1 % topical gel Apply topically 2 times daily   Taking     clonazePAM (KLONOPIN) 1 MG tablet 1 mg bid and 2 mg qhs 120 tablet 0 Taking     cloNIDine (CATAPRES) 0.1 MG tablet Take 1 tablet (0.1 mg) by mouth 2 times daily 180 tablet 3 Taking     cyclobenzaprine (FLEXERIL) 10 MG tablet Take 1 tablet (10 mg) by mouth At Bedtime 90 tablet 0 Taking     eletriptan (RELPAX) 40 MG tablet Take 40 mg by mouth at onset of headache for migraine   Taking     FIBER PO Take 2 tablets by mouth daily   Taking     [] fluconazole (DIFLUCAN) 100 MG tablet Take 1 tablet (100 mg) by mouth daily for 15 days 15 tablet 3 Taking     folic acid (FOLVITE) 400 MCG tablet Take 400 mcg by mouth daily   Taking     hydrOXYzine (ATARAX) 50 MG tablet Take 2 tablets (100 mg) by mouth 2 times daily as needed for anxiety 120 tablet 1      IBUPROFEN PO Take 400-600 mg by mouth every 6 hours as needed for moderate pain   Taking     levothyroxine (SYNTHROID) 50 MCG tablet Take 1 tablet (50 mcg) by mouth daily 90 tablet 3 Taking     loperamide (IMODIUM A-D) 2 MG tablet Take 4 mg by mouth as needed for diarrhea   Taking     mirabegron (MYRBETRIQ) 25 MG 24 hr tablet Take 1 tablet (25 mg) by mouth daily 90 tablet 1 Taking     mirtazapine (REMERON) 30 MG tablet Take 2 tablets (60 mg) by mouth At Bedtime 180 tablet 1      Multiple Vitamins-Minerals (WOMENS MULTI VITAMIN & MINERAL PO) Take 1 tablet by mouth daily   Taking     NEW MED    Taking     OLANZapine (ZYPREXA) 20 MG tablet Take half tab (10 mg) by mouth twice daily 90 tablet 0 Taking     Ondansetron (ZOFRAN ODT PO) Take 4 mg by mouth every 8 hours as needed for nausea   Taking     oxybutynin ER (DITROPAN-XL) 5 MG 24 hr tablet Take 1 tablet (5 mg) by mouth daily 90 tablet 3 Taking     pantoprazole (PROTONIX) 40 MG EC tablet Take 1 tablet (40 mg) by mouth every morning (before breakfast) 90 tablet 3 Taking     tolterodine (DETROL  "LA) 2 MG 24 hr capsule Take 1 capsule (2 mg) by mouth daily 90 capsule 3 Taking     triamcinolone (KENALOG) 0.1 % ointment    Taking     UNABLE TO FIND MEDICATION NAME: NEXAPLON IMPLANT for birth control one year 2017   Taking     Allergy:  Allergies   Allergen Reactions     Amoxicillin-Pot Clavulanate      PN: LW Reaction: Itching, Pruritis     Azithromycin      Other reaction(s): Dermatitis     Cephalexin      PN: LW Reaction: Itching, Pruritis     Compazine [Prochlorperazine] Other (See Comments)     dystonia     Metoclopramide Other (See Comments)     \"I feel like I am crawling out of my skin\"     Minocycline Nausea and Vomiting     PN: DIARRHEA, VOMITING, AND STOMACH CRAMPS     Penicillins Itching     Reglan [Metoclopramide Hcl] Other (See Comments)     Sensation of \"crawling out of skin\"     Restoril [Temazepam]      Thorazine [Chlorpromazine] Other (See Comments)     dystonia     Abilify [Aripiprazole] Rash     Lamotrigine Rash     Severe drug rash - contraindication to receiving again. Skin peeling.      Sulfa Drugs Rash     Social History:   Social History     Socioeconomic History     Marital status:      Spouse name: Not on file     Number of children: Not on file     Years of education: Not on file     Highest education level: Not on file   Occupational History     Not on file   Social Needs     Financial resource strain: Not on file     Food insecurity:     Worry: Not on file     Inability: Not on file     Transportation needs:     Medical: Not on file     Non-medical: Not on file   Tobacco Use     Smoking status: Current Some Day Smoker     Types: Cigarettes     Smokeless tobacco: Never Used   Substance and Sexual Activity     Alcohol use: No     Alcohol/week: 1.2 oz     Types: 2 Glasses of wine per week     Comment: Quit on 09/07/18     Drug use: Yes     Frequency: 0.5 times per week     Types: Marijuana     Sexual activity: Yes     Partners: Male     Birth control/protection: OCP   Lifestyle "     Physical activity:     Days per week: Not on file     Minutes per session: Not on file     Stress: Not on file   Relationships     Social connections:     Talks on phone: Not on file     Gets together: Not on file     Attends [employer] service: Not on file     Active member of club or organization: Not on file     Attends meetings of clubs or organizations: Not on file     Relationship status: Not on file     Intimate partner violence:     Fear of current or ex partner: Not on file     Emotionally abused: Not on file     Physically abused: Not on file     Forced sexual activity: Not on file   Other Topics Concern     Parent/sibling w/ CABG, MI or angioplasty before 65F 55M? Not Asked   Social History Narrative     Not on file     Family History:  Family History   Problem Relation Age of Onset     Depression Mother      Lipids Father         hyperlipidemia     Macular Degeneration Father        ROS:   ROS: 12 point ROS neg other than the symptoms noted above in the HPI.    Physical exam:  Temp:  [97.4  F (36.3  C)-97.5  F (36.4  C)] 97.5  F (36.4  C)  Pulse:  [64-70] 70  Heart Rate:  [] 70  Resp:  [9-56] 17  BP: (59-81)/(35-52) 81/49  MAP:  [1 mmHg-92 mmHg] 51 mmHg  Arterial Line BP: (1-94)/(1-91) 87/30  SpO2:  [83 %-100 %] 100 %  Wt Readings from Last 3 Encounters:   05/11/19 49.4 kg (109 lb)   05/06/19 49.8 kg (109 lb 12.8 oz)   04/01/19 51.2 kg (112 lb 12.8 oz)       General: In bed, sleepy, NAD, on RA  HEENT: EOMI, PERRLA, no scleral icterus or injection  CARDIAC: S1/S2 heard, no murmurs appreciated. Peripheral pulses 2+  RESP: normal breath sounds, no wheezes or crackles appreciated.  GI: NT/ND, no guarding or rebound, bowel sounds active  :  patel in place  EXTREMITIES: NO LE edema, pulses 2+; bruising on knees   NEURO: Alert, oriented to person only; slurred speech, no focal deficits      Labs:  CBC  Recent Labs   Lab 05/11/19 2018   WBC 23.8*   RBC 4.88   HGB 14.6   HCT 42.8   MCV 88   MCH 29.9    MCHC 34.1   RDW 13.5        BMP  Recent Labs   Lab 05/11/19 2018      POTASSIUM 4.2   CHLORIDE 97   CO2 28   ANIONGAP 9   *   BUN 20   CR 1.04   GFRESTIMATED 63   GFRESTBLACK 73   ISAURO 8.4*   MAG 1.5*        INR  Recent Labs   Lab 05/11/19 2018   INR 1.07     Liver panel  Recent Labs   Lab 05/11/19 2018   PROTTOTAL 6.9   ALBUMIN 3.4   BILITOTAL 0.6   ALKPHOS 102   AST 54*   ALT 22       MELD-Na score: 7 at 5/11/2019  8:18 PM  MELD score: 7 at 5/11/2019  8:18 PM  Calculated from:  Serum Creatinine: 1.04 mg/dL at 5/11/2019  8:18 PM  Serum Sodium: 134 mmol/L at 5/11/2019  8:18 PM  Total Bilirubin: 0.6 mg/dL (Rounded to 1 mg/dL) at 5/11/2019  8:18 PM  INR(ratio): 1.07 at 5/11/2019  8:18 PM  Age: 48 years    Imaging/Procedure results:  Recent Results (from the past 24 hour(s))   Abd/pelvis CT,  IV  contrast only TRAUMA / AAA    Narrative    CT ABDOMEN PELVIS WITH CONTRAST   5/11/2019 10:14 PM     HISTORY: Abdominal pain.    TECHNIQUE: 54mL Isovue-370 IV were administered. After contrast  administration, volumetric helical sections were acquired from the  lung bases to the ischial tuberosities. Coronal images were also  reconstructed. Radiation dose for this scan was reduced using  automated exposure control, adjustment of the mA and/or kV according  to patient size, or iterative reconstruction technique.    COMPARISON: CT of the abdomen and pelvis performed 7/31/2018.     FINDINGS: There is a moderate to large amount of stool throughout the  colon, suggesting constipation. No evidence for colitis or  diverticulitis. There are several mildly dilated loops of mid and  proximal small bowel in the left upper quadrant, with no definite  transition point identified. No free fluid in the pelvis. Right  femoral venous catheter is noted. Mild periportal edema is a  nonspecific finding. The liver, gallbladder, spleen, adrenal glands,  pancreas, and kidneys are otherwise unremarkable. No  hydronephrosis.  Previously described bilobed low-attenuation lesion abutting the  gastric fundus medially is unchanged, with the largest lobe (series 2  image 13) measuring 2.9 x 2 cm. Mild scarring and/or atelectasis at  both lung bases unchanged.      Impression    IMPRESSION:   1. Moderate to large amount of stool throughout the colon suggests  constipation.  2.  Multiple mildly prominent loops of mid and proximal small bowel,  with no definite transition point. Findings suggest ileus, although a  developing small bowel obstruction is not entirely excluded. Clinical  correlation and follow-up are recommended.  3. Previously described bilobed low-density lesion along the gastric  fundus medially is unchanged, and remains indeterminate. A  gastrointestinal duplication cyst could have this appearance, although  neoplasm again cannot be excluded.       CRISTINA RUBIO MD   XR Chest 1 View    Narrative    CHEST ONE VIEW   5/11/2019 10:20 PM     COMPARISON: 10/10/2018.    HISTORY: Dyspnea.    FINDINGS: There is some scarring in the left apex. The lungs are  otherwise clear. No pneumothorax or pleural effusion. The cardiac  silhouette and pulmonary vessels are within normal limits.      Impression    IMPRESSION: No acute infiltrate.     PINO JEFFERY MD     ASSESSMENT & PLAN :  Ana Laura Wharton is a 48 year old female with a PMHx significant for bipolar I d/o, anxiety, depression, recurrent UTIs, and HTN who is being transferred from [facility] ED for medication overdose as a suicide attempt.     NEURO  # Sedation  None     #Suicide attempt  #Bipolar I, anxiety, depression  - Psych consult when medically stable   - Holding citalopram 20mg every day, Zyprexa 10mg BID, mirtazapine 60mg QPM,   - Sitter   - Ordered clonazepam 1mg TID (takes 1mg BD and 2mg at bedtime), hydroxyzine 100mg BID PRN      CARDIOVASCULAR  #Clonidine/beta blocker overdose   #Hypotension, lactic acidosis   Patient reported taking 180 tabs total of both  Coreg 6.25mg and clonidine 0.1mg on 5/11 (last at 7pm); d/w poison control; s/p 10mg Narcan x2 and insulin gtt w/ improvement in addition to norepi, epi, and vasopressin; clonidine lasts 12-16hrs, Coreg peaks ~2hr; dc'd glucagon   - Narcan gtt at 10mg/hr  - Cont insulin and D50gtt   - Wean pressors as able  - iCal Q4H, aim for 1.5-2x the upper limit of nml     #Prolonged QTc  Patient presented w/ QTc of 507, has now decreased to 468    PULM  #AHRF  Patient now requiring up to 7L NC after getting all of the fluid  - BiPAP, ABG/CXR pending     GI  #Elevated lipase   #Hx of alcohol induced pancreatitis   Lipase 1,100; per chart review patient was c/o abdominal pain (hasn't c/o this here), CT abd/pelvis w/ con showed no pancreatitis  - Fluids as above     #Elevated AST   Likely related to alcohol use     #Ileus vs SBO   CT abd/pelvis w/ con showed large stool burden, ileus but could not r/o SBO, and a bilobed low-density lesion along the gastric fundus (unchanged from 7/2018), duplication cyst vs neoplasm  - Surgery consulted     # Nutrition: NPO except meds, ice chips, water for now     RENAL  # Fluids: s/p 2L NS at OSH; 1L LR and 25g 500cc 5% albumin here    #Overactive bladder  - Cont oxybutynin, hold mirabegron     #Frequent UTIs  Holding cipro 125mg every day for UTI ppx given prolonged QT     # Electrolytes: on protocol     HEME/ONC  #Leukocytosis  Reactive vs infectious  - CTM    ENDOCRINE  #Hypothyroidism   - Cont Synthroid     INFECTIOUS DISEASE  #?Sepsis  UA not convincing for infection; CXR w/o infiltrate, procal wnl; patient has lactic acidosis and leukocytosis w/ L shift which may also be 2/2 her overdose;   - Blood cx x2 and urine cx pending  - Cont Flagyl/cefepime for now     SKIN/MSK  SUGEY    Prophylaxis:  DVT: none, Padua score is 3   GI: none  Family: not present, came with her friend who informed her family   Disposition: ICU  Code Status: Full    Laura Gonzalez  Internal Medicine, PGY2  [facility]  [place]   Pager [phone]    Patient was seen and discussed with attending physician Dr. Cornelius, who agrees with above assessment and plan.

## 2019-05-12 NOTE — ED PROVIDER NOTES
History     Chief Complaint:  Drug Overdose    HPI   Ana Laura Wharton is a 48 year old female who presents with with reported drug overdose and a suicide attempt.  History somewhat vague.  Patient significant other Nessa presents with her and he states he received a phone call from her roughly an hour prior to arrival where she admitted to taking her medication and attempt to kill herself.  Patient states that since roughly 4 AM she has been taking handfuls of pills primarily her Coreg and clonidine every few hours and attempt to kill herself.  She also states however she has been taking her other home medications though cannot comment on specifics or quantities.  She she denies any complaints at bedside.  Nessa reports that patient has been binge drinking the past few weeks she has had reported increased stressors.  She does have a history of suicide attempt.    Allergies:  Amoxicillin  Azithromycin  Cephalexin  Compazine  Metoclopramide  Minocycline  Penicillins  Reglan  Restoril  Thorazine  Abilify   Lamotrigine  Sulfa drugs     Medications:    Tylenol   Albuterol inhaler  Lipitor   Coreg  Cipro  Celexa  Clindagel   Klonopin  Cataprex  Flexeril  Relpax  Fiber  Folvite  Atarax  Ibuprofen  Synthroid  Imodium  Remeron  Myrbetriq  Multivitamin  Zyprexa  Zofran  Ditropan  Protonix  Detrol  Kenalog    Past Medical History:    Abnormal pap smear of cervix  Anxiety  Bipolar disorder  C. 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  Depressive disorder  Hypertension  GERD  Hypothyroidism  Migraine  Spontaneous pneumothorax  Suicide attempt  Acute renal failure  Paroxysmal atrial fibrillation    Past Surgical History:    Arthroscopy knee - left knee  Cervix surgery - for pre-cancerous changes  Left knee surgery  Orthopedic surgery - left shoulder   Thoracic surgery - for pneumothorax, pleurodesis and lobe resection    Family History:    Mother - depression  Father - hyperlipidemia, macular degeneration    Social History:  The patient was  accompanied to the ED by significant otherMimi  [phone]  Smoking Status: Yes - current some day smoker  Smokeless Tobacco: No  Alcohol Use: Yes  Drug Use: Yes - Marijuana   Marital Status:   [4]     Review of Systems   Respiratory: Negative for shortness of breath.    Cardiovascular: Negative for chest pain.   Gastrointestinal: Negative for abdominal pain, nausea and vomiting.   Genitourinary: Negative for dysuria.   Neurological: Negative for weakness.   All other systems reviewed and are negative.        Physical Exam     Patient Vitals for the past 24 hrs:   BP Temp Temp src Pulse Heart Rate Resp SpO2 Weight   05/11/19 2150 (!) 75/48 -- -- 67 67 (!) 56 93 % --   05/11/19 2140 (!) 59/43 -- -- 67 141 (!) 41 97 % --   05/11/19 2130 (!) 68/35 -- -- 68 67 -- 95 % --   05/11/19 2120 (!) 65/44 -- -- 65 65 16 91 % --   05/11/19 2115 (!) 61/41 -- -- 64 64 26 95 % --   05/11/19 2110 (!) 62/40 -- -- 64 64 (!) 55 95 % --   05/11/19 2100 (!) 62/38 -- -- 65 64 25 97 % --   05/11/19 2050 (!) 71/44 -- -- 65 65 9 96 % 49.4 kg (109 lb)   05/11/19 2045 (!) 65/41 -- -- 66 65 13 96 % --   05/11/19 2040 (!) 64/42 -- -- 66 65 30 97 % --   05/11/19 2030 (!) 64/44 -- -- 66 66 21 99 % --   05/11/19 2020 (!) 73/50 -- -- 66 67 14 (!) 83 % --   05/11/19 2015 (!) 75/35 97.4  F (36.3  C) Temporal 67 -- 20 97 % --   05/11/19 2010 (!) 77/52 -- -- 68 69 -- 99 % --        Physical Exam  Nursing note and vitals reviewed.  Constitutional: Thin appearing  Eyes: Conjunctiva normal.  Pupils are equal, round, and reactive to light.   ENT: Nose normal. Mucous membranes pink and moist.    Neck: Normal range of motion.  CVS: Normal rate, regular rhythm.  Normal heart sounds.  No murmur.  Pulmonary: Lungs clear to auscultation bilaterally. No wheezes/rales/rhonchi.  GI: Abdomen soft. Nontender, nondistended. No rigidity or guarding.    MSK: No calf tenderness or swelling. No c/t/l bony step offs  Neuro: Alert. Follows simple commands.  Skin:  Skin is warm and dry. No rash noted. Multiple older appearing ecchymoses to bilateral legs   Psychiatric: Blunt affect.       Emergency Department Course     ECG:  ECG (22:33:35):  Rate 65 bpm. UT interval 166. QRS duration 86. QT/QTc 488/507. P-R-T axes 52 75 54. Normal sinus rhythm. Septal infarct. Age undetermined.Prolonged QT, Abnormal ECG. Interpreted at 2233 by Nori Carrasco DO.      ECG taken at 2009 ECG read at 2009by Dr. Nori Carrasco DO    Rate 69 bpm. UT interval 152. QRS duration 92. QT/QTc 488. P-R-T axes 41-55-49.     Imaging:  Radiology findings were communicated with the patient who voiced understanding of the findings.    XR Chest 1 View   Preliminary Result   IMPRESSION: No acute infiltrate.       Abd/pelvis CT,  IV  contrast only TRAUMA / AAA   Preliminary Result   IMPRESSION:    1. Moderate to large amount of stool throughout the colon suggests   constipation.   2.  Multiple mildly prominent loops of mid and proximal small bowel,   with no definite transition point. Findings suggest ileus, although a   developing small bowel obstruction is not entirely excluded. Clinical   correlation and follow-up are recommended.   3. Previously described bilobed low-density lesion along the gastric   fundus medially is unchanged, and remains indeterminate. A   gastrointestinal duplication cyst could have this appearance, although   neoplasm again cannot be excluded.             Laboratory:  Laboratory findings were communicated with the patient who voiced understanding of the findings.  CBC: AWNL (WBC 23.8(H), HGB 14.6, )  CMP: glucose 146(H), calcium 8.4(L), AST 54(H)AWNL (Creatinine 1.04)  Lipase: 1100(H)  Magnesium: 1.5(L)  Troponin (Collected 2018): <0.015  INR: 1.07  Acetaminophen level: <2  Salicylate level: <2  TSH with free T4 reflex: 7.01(H)  T4 free: 1.04  HCG Qualitative blood: negative  Alcohol level: <0.01  Ammonia: 32  Glucose by Meter (Collected 2114): 92  Blood culture x2:  pending  ISTAT gases lactate fabby POCT: pH 7.36, PCO2 34(L), PO2 52(H), Bicarbonate 19(L), O2 Sat 85, lactic acid 2.5(H)    UA with Microscopic: protein albumin 10(A), leukocyte esterase small(A), WBC 9(A), RBC 3(A), bacteria few(A), amorphous moderate(A) AWNL  Drug Abuse Screen Urine: negative AWNL    Procedures:      Central Line Placement Procedure Note:     Procedure:  Central Line Placement.    Indications: Vascular access and pressor support    Consent:  Risks (including but not limited to: pneumothorax,bleeding, infection or artery puncture), benefits and alternatives were discussed with  unable to obtain due to emergency conditions and consent for procedure was obtained.    Timeout:  Universal protocol was followed. TIME OUT conducted just prior to starting procedure confirmed patient identity, site/side, procedure, patient position, and availability of correct equipment and implants.?  Yes    Procedure note:  Right Femoral and the right femoral area was prepped, cleansed and draped in a sterile fashion.  Mask, gown and gloves were used per sterile protocol.  Patient was then placed into Trendelenburg position and lidocaine was used for local anesthesia.  Vascular probe with ultrasound was used in a sterile fashion for guidence.  Introducer needle was then used to gain access to the central venous circulation.  Using Seldinger technique the  Triple lumen catheter was placed.  Catheter port(s) were aspirated and flushed.  Central line was sutured in place and sterile dressing applied.       Patient Status:  Patient tolerated the procedure well.  There were no complications.      Arterial Line Insertion Procedure Note     INDICATION: Continuous blood pressure monitoring    ANESTHESIA:  1% lidocaine    CONSENT:   Risks (including but not limited to: bleeding, infection or pain), benefits, and alternatives were discussed with unable to obtain due to emergency conditions and consent for procedure was  obtained.    [place] protocol was followed.  TIME OUT conducted just prior to starting the procedure, confirmed patient identity, site/side, procedure, patient position, and availability of correct equipment and implants.  Yes    The skin overlying the right femoral artery was prepped and draped in a sterile fashion. The artery was entered with a finder needle through which a guidewire was inserted. The needle was then removed and an arterial cannula was inserted over the guidewire without difficulty. The guidewire was removed and the catheter was sutured to the underlying skin. The patient tolerated the procedure well and there were no immediate complications.    PATIENT STATUS: The patient tolerated the procedure well and there were no complications.    Interventions:    Medications   norepinephrine (LEVOPHED) 16 mg in  mL infusion (0.4 mcg/kg/min × 49.8 kg Intravenous Rate/Dose Change 5/11/19 2133)   0.9% sodium chloride BOLUS (0 mLs Intravenous Stopped 5/11/19 2045)   dextrose 50 % injection 25 g (25 g Intravenous Given 5/11/19 2142)   dextrose 10% infusion (has no administration in time range)   dextrose 50 % injection 25 g (has no administration in time range)   dextrose 50 % injection 25 g (has no administration in time range)   dextrose 10% infusion (has no administration in time range)   dextrose 50 % injection 25 g (has no administration in time range)   insulin regular (NovoLIN R) 10 units/mL in NS INFUSION (1 Units/kg/hr × 49.4 kg Intravenous New Bag 5/11/19 2152)   EPINEPHrine (ADRENALIN) 5 mg in sodium chloride 0.9 % 250 mL infusion (0.15 mcg/kg/min × 49.4 kg Intravenous Rate/Dose Change 5/11/19 2240)   glucagon injection 1 mg (has no administration in time range)   glucagon 5 mg in D5W 50 mL infusion ADULT (5 mg/hr Intravenous New Bag 5/11/19 2221)   0.9% sodium chloride BOLUS (1,000 mLs Intravenous New Bag 5/11/19 2023)   magnesium sulfate 2 g in NS intermittent infusion (PharMEDium or [facility]  "Cmpd) (2 g Intravenous New Bag 5/11/19 2110)   calcium chloride 1 g in sodium chloride 0.9 % 100 mL intermittent infusion (1 g Intravenous Given 5/11/19 2138)   glucagon injection 1 mg (1 mg Intravenous Given 5/11/19 2125)   ceFEPIme (MAXIPIME) 1g vial to attach to  ml bag for ADULTS or NS 50 ml bag for PEDS (0 g Intravenous Stopped 5/11/19 2139)   metroNIDAZOLE (FLAGYL) infusion 500 mg ( Intravenous Restarted 5/11/19 2231)   glucagon injection 1 mg (1 mg Intravenous Given 5/11/19 2154)   iopamidol (ISOVUE-370) solution 500 mL (54 mLs Intravenous Given 5/11/19 2213)   sodium chloride (PF) 0.9% PF flush 100 mL (54 mLs Intravenous Given 5/11/19 2213)        Emergency Department Course:  Nursing notes and vitals reviewed.  IV was inserted and blood was drawn for laboratory testing, results above.  The patient provided a urine sample here in the emergency department. This was sent for laboratory testing, findings above.   EKG obtained in the ED, see results above.      (2009)   I performed an exam of the patient as documented above. History obtained    (2015)   Patient placed on KELLEY. Informal bedside FAST negative.     Patient was evaluated multiple times in ED by myself.  I spoke to poison control regarding the case.     21:31 I spoke to Dr. Cornelius at [facility] who accepted the patient pending formal CT.    22:35: EMS here for transport      I discussed the treatment plan with the patient and her significant other, Nessa.  They expressed understanding of this plan and consented to admission. I discussed the patient with [facility], who will admit the patient to a monitored bed for further evaluation and treatment.       Impression & Plan      Medical Decision Making:    Patient is a 48-year-old female who presents for reported suicide attempt from drug overdose.  Concern for beta-blocker overdose based on patient's report, though unknown quantity; she also reports \"other medications\" and on review " patient on a number of psychiatric medications (zyprexa, remeron, celexa, etc).  Her toxidrome presentation is a bit atypical.  She presented hypotensive though not bradycardic which I would expect with a significant Beta blocker overdose.  Her EKG is without focal ischemia or AV conduction block.  Screening troponin ordered and negative.  Patient has noted leukocytosis and her initial lactate was elevated.  I could not exclude possible sepsis so patient was empirically given cefepime, Flagyl and vancomycin for possible intra-abdominal coverage. Her lipase is elevated suggesting pancreatitis.  Fortunately CT abdomen essentially unremarkable other than possible ileus.  UA without gross infection.  CXR unremarkable.  A bedside US was unremarkable to explain profound hypotension.  Patient required multiple pressors and still remained persistently hypotensive.  Beta blocker overdose antidote initiated including calcium, glucagon gtt and high dose insulin gtt.  The patient's clinical course is guarded as was communicated to patient and her significant other.  She is in shock though undifferentiated at this time though greatest suspicion secondary to medication induced.  She was accepted by [facility] ICU for escalation of care.       Critical Care time was 55 minutes for this patient excluding procedures.     Diagnosis:    ICD-10-CM    1. Suicide attempt (H) T14.91XA UA with Microscopic   2. Suicide attempt by beta-adrenergic antagonist overdose, initial encounter (H) T44.7X2A    3. Shock (H) R57.9    4. Acute pancreatitis, unspecified complication status, unspecified pancreatitis type K85.90    5. Lactic acidosis E87.2        Disposition:   Transferred to [facility] Diagnoses: The patient's lactate is elevated secondary to shock, concerns for possible sepsis though stronger suspicion medication induced    Scribe Disclosure:  Gabi CRUZ, am serving as a scribe at 8:10 PM on 5/11/2019 to  document services personally performed by Nori Carrasco DO, based on my observations and the provider's statements to me.  5/11/2019   [facility] EMERGENCY DEPARTMENT            Nori Carrasco DO  05/12/19 [id number]

## 2019-05-12 NOTE — PROCEDURES
Procedure:   Central venous catheter placement        Location:   Left internal jugular          Indication:   Hypotension, need for IV access         Consent:   Obtained from the patient/family. Omitted due to medical emergency.  Risks, benefits and alternatives discussed. Risks include bleeding, infection, vascular aneurysm or rupture.         Procedure Summary:   US guidance: Yes    Protection/Precaution measures: Mask with eye protection, cap, gown, gloves were used: Yes  A time-out was performed.   The central line was flushed with sterile saline. The patient's Left internal jugular  was prepped and draped in conventional sterile fashion. Anesthesia was achieved with 1% lidocaine. The introducer needle was inserted into the left internal jugular , then venous blood was withdrawn into the syringe. The syringe was removed and the guide wire was advanced through the introducer needle. A small incision was made with a scalpel and the introducer needle was removed. A dilator was introduced through the wire followed by the venous catheter that was sutured at 18 cm olaf.  Good blood return from each port.         Complications:   No immediate complications.  CXR ordered/pending for position check and pneumothorax.    This procedure is performed by Jihan Fields MD.

## 2019-05-12 NOTE — PROGRESS NOTES
Pt. Was intubated with #7 ETT and secured 22 cm @ lip. Bilateral breath sound and positive en tidal co2 noted.   Pt was placed on ventilator with setting  12/500/+10 and 100%/ Sat >96%. Rt will continue to follow.

## 2019-05-12 NOTE — PROGRESS NOTES
Brief clinical course today since H&P    Left femoral ART line placed.   Left internal jugular CVC placed.     Was given intralipid with minimal effect.     Required up to 6 pressors:  Norepinephrine  Epinephrine   Phenylephrine  Dopamine  Vasopressin   Angiotensin II, seemed to help, subsequently able to decrease Epi to 0.4    Still no BM or and not passing gas, abd more distended. G tube to LIS.  But coiled and may not be very effective.  Adjusting and will repeat Abd Xray    Discussed case with poison control, can consider re-dosing intralipid if decompensating.

## 2019-05-12 NOTE — PROCEDURES
Procedure Note    PROCEDURES PERFORMED:   1. Left femoral arterial line placement    PHYSICIANS:  1. Medicine resident: Nick Arnett MD PhD    PRE-PROCEDURE DIAGNOSIS:   Shock    POST-PROCEDURE DIAGNOSIS:  Shock  Consent obtained with discussion of risks.  All questions were answered. Sterile prep and procedure.    INDICATION:  Ana Laura Wharton is a 48 year old female admitted for shock secondary to clonidine, coreg and additional unknown overdose.       DESCRIPTION:  1. Location: Left femoral  2. Access: Local anesthetic with lidocaine.  A standard (18 g)micropuncture (21 g) needle with ultrasound guidance was used to establish vascular access using a modified Seldinger technique.  3. Sheath/catheters: none  4. Estimated blood loss of <5 mL.    MEDICATIONS:  1. Local anesthesia with lidocaine.     COMPLICATIONS:  1. None    SUMMARY:   1.Succesful line placement      Nick Arnett MD PhD  Internal Medicine, PGY3  Pager [phone]

## 2019-05-12 NOTE — ED TRIAGE NOTES
Patient states she took handful of meds beta blocker and blood pressure pills starting at 4 am. Patient states she took Clonidne, coreg -handfuls of the pills with intention to harm herself taking the pills every few hours last time 1 hour ago.   Patient friend brought her in stated she called him 1 hour ago.   Patient has slurred speech.

## 2019-05-12 NOTE — ANESTHESIA PROCEDURE NOTES
ANESTHESIOLOGY RESIDENT/CRNA INTUBATION NOTE  Indication for intubation: respiratory insufficiency, hemodynamic instability, altered level of consciousness, airway protection.  Provider Ordering Intubation: Millicent Cornelius MD  History regarding the most recent potassium obtained: Yes  History regarding renal failure obtained: Yes  History of presence or absence of CVA/stroke was obtained: Yes  History of presence or absence of NM disorder obtained: Yes  Post Intubation:  No apparent complications, Sedation to be ordered by primary/ICU team, Primary/ICU team to review CXR, Vent settings by primary/ICU team, ETT secured and Report given to primary nurse and/or team  Called to intubated patient on 4C. Patient with low MAPS and combative. RN pushed Etomidate and once patient became apneic and unresponsive we pushed rocuronium. Sats were intermittently in the 80's and as low as mid 50's however when we put an ear probe on sats were in the 90's. Intubated without difficulty.     -Marcelo Ewing MD    Medications Administered  Rocuronium (ZEMURON) 10 mg/mL injection, 10 mg    Medication administration at: 5/12/2019 5:42 AM

## 2019-05-12 NOTE — PROGRESS NOTES
Admitted/transferred from: [facility]  Reason for admission/transfer: Beta blocker overdose requiring 3 pressors.   Patient status upon admission/transfer: Alert and oriented. Denies pain. NSR w/ rates in the 60's. Hypotensive, MAPs in 50's. On levo, vaso and epi. Insulin drip infusing.   Interventions: Narcan, D50 gtt initiated. Napoles placed. BG monitored.   Plan: Continue plan of care for beta blocker reversal.   2 RN skin assessment: completed by Tatyana Carrillo.   Result of skin assessment and Abrasions to knees, shins and right ankle.  interventions/actions:  Height, weight, drug calc weight: done  Patient belongings (see Flowsheet - Adult Profile for details): none present.   MDRO education (if applicable):

## 2019-05-13 ENCOUNTER — APPOINTMENT (OUTPATIENT)
Dept: GENERAL RADIOLOGY | Facility: CLINIC | Age: 49
DRG: 987 | End: 2019-05-13
Attending: ANESTHESIOLOGY
Payer: COMMERCIAL

## 2019-05-13 ENCOUNTER — APPOINTMENT (OUTPATIENT)
Dept: CT IMAGING | Facility: CLINIC | Age: 49
DRG: 987 | End: 2019-05-13
Attending: ANESTHESIOLOGY
Payer: COMMERCIAL

## 2019-05-13 ENCOUNTER — ALLIED HEALTH/NURSE VISIT (OUTPATIENT)
Dept: NEUROLOGY | Facility: CLINIC | Age: 49
DRG: 987 | End: 2019-05-13
Attending: PSYCHIATRY & NEUROLOGY
Payer: COMMERCIAL

## 2019-05-13 DIAGNOSIS — I67.83 PRES (POSTERIOR REVERSIBLE ENCEPHALOPATHY SYNDROME): Primary | ICD-10-CM

## 2019-05-13 LAB
ACETAMINOPHEN QUAL: NEGATIVE
ALBUMIN SERPL-MCNC: 2.2 G/DL (ref 3.4–5)
ALBUMIN SERPL-MCNC: 2.2 G/DL (ref 3.4–5)
ALBUMIN SERPL-MCNC: 2.3 G/DL (ref 3.4–5)
ALBUMIN SERPL-MCNC: 2.5 G/DL (ref 3.4–5)
ALP SERPL-CCNC: 52 U/L (ref 40–150)
ALP SERPL-CCNC: 56 U/L (ref 40–150)
ALP SERPL-CCNC: 64 U/L (ref 40–150)
ALP SERPL-CCNC: 65 U/L (ref 40–150)
ALT SERPL W P-5'-P-CCNC: 101 U/L (ref 0–50)
ALT SERPL W P-5'-P-CCNC: 78 U/L (ref 0–50)
ALT SERPL W P-5'-P-CCNC: 96 U/L (ref 0–50)
ALT SERPL W P-5'-P-CCNC: 98 U/L (ref 0–50)
AMMONIA PLAS-SCNC: 44 UMOL/L (ref 10–50)
AMOBARBITAL QUAL: NEGATIVE
ANION GAP SERPL CALCULATED.3IONS-SCNC: 2 MMOL/L (ref 3–14)
ANION GAP SERPL CALCULATED.3IONS-SCNC: 4 MMOL/L (ref 3–14)
ANION GAP SERPL CALCULATED.3IONS-SCNC: 6 MMOL/L (ref 3–14)
ANION GAP SERPL CALCULATED.3IONS-SCNC: 6 MMOL/L (ref 3–14)
AST SERPL W P-5'-P-CCNC: 184 U/L (ref 0–45)
AST SERPL W P-5'-P-CCNC: 238 U/L (ref 0–45)
AST SERPL W P-5'-P-CCNC: 244 U/L (ref 0–45)
AST SERPL W P-5'-P-CCNC: 283 U/L (ref 0–45)
B-HCG SERPL-ACNC: 2 IU/L (ref 0–5)
BACTERIA SPEC CULT: NO GROWTH
BARBITAL QUAL: NEGATIVE
BASE EXCESS BLDA CALC-SCNC: 0.8 MMOL/L
BASE EXCESS BLDA CALC-SCNC: 1.6 MMOL/L
BASE EXCESS BLDA CALC-SCNC: 1.9 MMOL/L
BASE EXCESS BLDA CALC-SCNC: 3.1 MMOL/L
BASE EXCESS BLDA CALC-SCNC: 3.2 MMOL/L
BASE EXCESS BLDA CALC-SCNC: 3.9 MMOL/L
BASE EXCESS BLDA CALC-SCNC: 4.7 MMOL/L
BASE EXCESS BLDA CALC-SCNC: 5.2 MMOL/L
BASE EXCESS BLDA CALC-SCNC: 5.5 MMOL/L
BASE EXCESS BLDV CALC-SCNC: 2.2 MMOL/L
BASE EXCESS BLDV CALC-SCNC: 5.4 MMOL/L
BASE EXCESS BLDV CALC-SCNC: 5.9 MMOL/L
BILIRUB SERPL-MCNC: 0.4 MG/DL (ref 0.2–1.3)
BILIRUB SERPL-MCNC: 0.5 MG/DL (ref 0.2–1.3)
BILIRUB SERPL-MCNC: 0.5 MG/DL (ref 0.2–1.3)
BILIRUB SERPL-MCNC: 0.6 MG/DL (ref 0.2–1.3)
BUN SERPL-MCNC: 14 MG/DL (ref 7–30)
BUN SERPL-MCNC: 15 MG/DL (ref 7–30)
BUN SERPL-MCNC: 17 MG/DL (ref 7–30)
BUN SERPL-MCNC: 17 MG/DL (ref 7–30)
BUTABARBITAL QUAL: NEGATIVE
BUTALBITAL QUAL: NEGATIVE
CA-I BLD-MCNC: 4.4 MG/DL (ref 4.4–5.2)
CA-I BLD-MCNC: 4.8 MG/DL (ref 4.4–5.2)
CA-I BLD-MCNC: 5.1 MG/DL (ref 4.4–5.2)
CAFFEINE QUAL: NEGATIVE
CALCIUM SERPL-MCNC: 7.5 MG/DL (ref 8.5–10.1)
CALCIUM SERPL-MCNC: 7.5 MG/DL (ref 8.5–10.1)
CALCIUM SERPL-MCNC: 7.7 MG/DL (ref 8.5–10.1)
CALCIUM SERPL-MCNC: 8.3 MG/DL (ref 8.5–10.1)
CARBAMAZEPINE QUAL: NEGATIVE
CARISOPRODOL QUAL: NEGATIVE
CHLORIDE SERPL-SCNC: 102 MMOL/L (ref 94–109)
CHLORIDE SERPL-SCNC: 102 MMOL/L (ref 94–109)
CHLORIDE SERPL-SCNC: 107 MMOL/L (ref 94–109)
CHLORIDE SERPL-SCNC: 107 MMOL/L (ref 94–109)
CHLORPROPAMIDE UR-MCNC: NEGATIVE UG/ML
CK SERPL-CCNC: 120 U/L (ref 30–225)
CO2 SERPL-SCNC: 28 MMOL/L (ref 20–32)
CO2 SERPL-SCNC: 29 MMOL/L (ref 20–32)
CO2 SERPL-SCNC: 32 MMOL/L (ref 20–32)
CO2 SERPL-SCNC: 34 MMOL/L (ref 20–32)
CORTIS SERPL-MCNC: 26.3 UG/DL (ref 4–22)
CREAT SERPL-MCNC: 0.79 MG/DL (ref 0.52–1.04)
CREAT SERPL-MCNC: 0.87 MG/DL (ref 0.52–1.04)
CREAT SERPL-MCNC: 0.89 MG/DL (ref 0.52–1.04)
CREAT SERPL-MCNC: 0.91 MG/DL (ref 0.52–1.04)
CREAT UR-MCNC: 175 MG/DL
DRUGS SERPL SCN: NEGATIVE
ERYTHROCYTE [DISTWIDTH] IN BLOOD BY AUTOMATED COUNT: 14 % (ref 10–15)
ERYTHROCYTE [DISTWIDTH] IN BLOOD BY AUTOMATED COUNT: 14.5 % (ref 10–15)
ERYTHROCYTE [DISTWIDTH] IN BLOOD BY AUTOMATED COUNT: 14.6 % (ref 10–15)
ETHCLORVYNOL QUAL: NEGATIVE
ETHINAMATE QUAL: NEGATIVE
ETHOSUXIMIDE QUAL: NEGATIVE
ETHOTOIN QUAL: NEGATIVE
FRACT EXCRET NA UR+SERPL-RTO: 0.1 %
GFR SERPL CREATININE-BSD FRML MDRD: 75 ML/MIN/{1.73_M2}
GFR SERPL CREATININE-BSD FRML MDRD: 76 ML/MIN/{1.73_M2}
GFR SERPL CREATININE-BSD FRML MDRD: 78 ML/MIN/{1.73_M2}
GFR SERPL CREATININE-BSD FRML MDRD: 89 ML/MIN/{1.73_M2}
GLUCOSE BLDC GLUCOMTR-MCNC: 100 MG/DL (ref 70–99)
GLUCOSE BLDC GLUCOMTR-MCNC: 101 MG/DL (ref 70–99)
GLUCOSE BLDC GLUCOMTR-MCNC: 101 MG/DL (ref 70–99)
GLUCOSE BLDC GLUCOMTR-MCNC: 102 MG/DL (ref 70–99)
GLUCOSE BLDC GLUCOMTR-MCNC: 103 MG/DL (ref 70–99)
GLUCOSE BLDC GLUCOMTR-MCNC: 104 MG/DL (ref 70–99)
GLUCOSE BLDC GLUCOMTR-MCNC: 104 MG/DL (ref 70–99)
GLUCOSE BLDC GLUCOMTR-MCNC: 106 MG/DL (ref 70–99)
GLUCOSE BLDC GLUCOMTR-MCNC: 107 MG/DL (ref 70–99)
GLUCOSE BLDC GLUCOMTR-MCNC: 109 MG/DL (ref 70–99)
GLUCOSE BLDC GLUCOMTR-MCNC: 111 MG/DL (ref 70–99)
GLUCOSE BLDC GLUCOMTR-MCNC: 129 MG/DL (ref 70–99)
GLUCOSE BLDC GLUCOMTR-MCNC: 164 MG/DL (ref 70–99)
GLUCOSE BLDC GLUCOMTR-MCNC: 58 MG/DL (ref 70–99)
GLUCOSE BLDC GLUCOMTR-MCNC: 73 MG/DL (ref 70–99)
GLUCOSE BLDC GLUCOMTR-MCNC: 83 MG/DL (ref 70–99)
GLUCOSE BLDC GLUCOMTR-MCNC: 94 MG/DL (ref 70–99)
GLUCOSE BLDC GLUCOMTR-MCNC: 96 MG/DL (ref 70–99)
GLUCOSE BLDC GLUCOMTR-MCNC: 97 MG/DL (ref 70–99)
GLUCOSE SERPL-MCNC: 103 MG/DL (ref 70–99)
GLUCOSE SERPL-MCNC: 112 MG/DL (ref 70–99)
GLUCOSE SERPL-MCNC: 92 MG/DL (ref 70–99)
GLUCOSE SERPL-MCNC: 97 MG/DL (ref 70–99)
GLUTETHIMIDE QUAL: NEGATIVE
HCO3 BLD-SCNC: 28 MMOL/L (ref 21–28)
HCO3 BLD-SCNC: 28 MMOL/L (ref 21–28)
HCO3 BLD-SCNC: 29 MMOL/L (ref 21–28)
HCO3 BLD-SCNC: 31 MMOL/L (ref 21–28)
HCO3 BLD-SCNC: 32 MMOL/L (ref 21–28)
HCO3 BLDV-SCNC: 31 MMOL/L (ref 21–28)
HCO3 BLDV-SCNC: 34 MMOL/L (ref 21–28)
HCO3 BLDV-SCNC: 34 MMOL/L (ref 21–28)
HCT VFR BLD AUTO: 28.6 % (ref 35–47)
HCT VFR BLD AUTO: 29.4 % (ref 35–47)
HCT VFR BLD AUTO: 29.6 % (ref 35–47)
HGB BLD-MCNC: 9.2 G/DL (ref 11.7–15.7)
HGB BLD-MCNC: 9.6 G/DL (ref 11.7–15.7)
HGB BLD-MCNC: 9.6 G/DL (ref 11.7–15.7)
IBUPROFEN QUAL: NEGATIVE
INTERPRETATION ECG - MUSE: NORMAL
LACTATE BLD-SCNC: 1 MMOL/L (ref 0.7–2)
LACTATE BLD-SCNC: 1.3 MMOL/L (ref 0.7–2)
LACTATE BLD-SCNC: 2.3 MMOL/L (ref 0.7–2)
MAGNESIUM SERPL-MCNC: 1.9 MG/DL (ref 1.6–2.3)
MAGNESIUM SERPL-MCNC: 2 MG/DL (ref 1.6–2.3)
MCH RBC QN AUTO: 29.3 PG (ref 26.5–33)
MCH RBC QN AUTO: 29.4 PG (ref 26.5–33)
MCH RBC QN AUTO: 29.4 PG (ref 26.5–33)
MCHC RBC AUTO-ENTMCNC: 32.2 G/DL (ref 31.5–36.5)
MCHC RBC AUTO-ENTMCNC: 32.4 G/DL (ref 31.5–36.5)
MCHC RBC AUTO-ENTMCNC: 32.7 G/DL (ref 31.5–36.5)
MCV RBC AUTO: 90 FL (ref 78–100)
MCV RBC AUTO: 91 FL (ref 78–100)
MCV RBC AUTO: 91 FL (ref 78–100)
MEPHENYTOIN QUAL: NEGATIVE
MEPHOBARBITAL QUAL: NEGATIVE
MEPROBAMATE QUAL: NEGATIVE
METHAQUALONE QUAL: NEGATIVE
METHARBITAL QUAL: NEGATIVE
METHSUXIMIDE QUAL: NEGATIVE
METHYPRYLON QUAL: NEGATIVE
O2/TOTAL GAS SETTING VFR VENT: 100 %
O2/TOTAL GAS SETTING VFR VENT: 50 %
O2/TOTAL GAS SETTING VFR VENT: 50 %
O2/TOTAL GAS SETTING VFR VENT: 60 %
O2/TOTAL GAS SETTING VFR VENT: 75 %
O2/TOTAL GAS SETTING VFR VENT: 75 %
O2/TOTAL GAS SETTING VFR VENT: 80 %
OXYHGB MFR BLDV: 65 %
OXYHGB MFR BLDV: 77 %
OXYHGB MFR BLDV: 85 %
PCO2 BLD: 44 MM HG (ref 35–45)
PCO2 BLD: 47 MM HG (ref 35–45)
PCO2 BLD: 56 MM HG (ref 35–45)
PCO2 BLD: 57 MM HG (ref 35–45)
PCO2 BLD: 59 MM HG (ref 35–45)
PCO2 BLD: 59 MM HG (ref 35–45)
PCO2 BLD: 60 MM HG (ref 35–45)
PCO2 BLD: 60 MM HG (ref 35–45)
PCO2 BLD: 63 MM HG (ref 35–45)
PCO2 BLDV: 68 MM HG (ref 40–50)
PCO2 BLDV: 69 MM HG (ref 40–50)
PCO2 BLDV: 72 MM HG (ref 40–50)
PENTOBARBITAL QUAL: NEGATIVE
PH BLD: 7.29 PH (ref 7.35–7.45)
PH BLD: 7.3 PH (ref 7.35–7.45)
PH BLD: 7.31 PH (ref 7.35–7.45)
PH BLD: 7.32 PH (ref 7.35–7.45)
PH BLD: 7.33 PH (ref 7.35–7.45)
PH BLD: 7.34 PH (ref 7.35–7.45)
PH BLD: 7.36 PH (ref 7.35–7.45)
PH BLD: 7.4 PH (ref 7.35–7.45)
PH BLD: 7.42 PH (ref 7.35–7.45)
PH BLDV: 7.26 PH (ref 7.32–7.43)
PH BLDV: 7.28 PH (ref 7.32–7.43)
PH BLDV: 7.3 PH (ref 7.32–7.43)
PHENACETIN QUAL: NEGATIVE
PHENOBARBITAL QUAL: NEGATIVE
PHENSUXIMIDE QUAL: NEGATIVE
PHENYTOIN QUAL: NEGATIVE
PHOSPHATE SERPL-MCNC: 3.1 MG/DL (ref 2.5–4.5)
PLATELET # BLD AUTO: 110 10E9/L (ref 150–450)
PLATELET # BLD AUTO: 116 10E9/L (ref 150–450)
PLATELET # BLD AUTO: 121 10E9/L (ref 150–450)
PO2 BLD: 101 MM HG (ref 80–105)
PO2 BLD: 159 MM HG (ref 80–105)
PO2 BLD: 219 MM HG (ref 80–105)
PO2 BLD: 233 MM HG (ref 80–105)
PO2 BLD: 47 MM HG (ref 80–105)
PO2 BLD: 60 MM HG (ref 80–105)
PO2 BLD: 63 MM HG (ref 80–105)
PO2 BLD: 69 MM HG (ref 80–105)
PO2 BLD: 95 MM HG (ref 80–105)
PO2 BLDV: 35 MM HG (ref 25–47)
PO2 BLDV: 40 MM HG (ref 25–47)
PO2 BLDV: 52 MM HG (ref 25–47)
POTASSIUM SERPL-SCNC: 3 MMOL/L (ref 3.4–5.3)
POTASSIUM SERPL-SCNC: 3.3 MMOL/L (ref 3.4–5.3)
POTASSIUM SERPL-SCNC: 3.4 MMOL/L (ref 3.4–5.3)
POTASSIUM SERPL-SCNC: 3.7 MMOL/L (ref 3.4–5.3)
PRIMIDONE QUAL: NEGATIVE
PROCALCITONIN SERPL-MCNC: 4.16 NG/ML
PROT SERPL-MCNC: 3.9 G/DL (ref 6.8–8.8)
PROT SERPL-MCNC: 4.1 G/DL (ref 6.8–8.8)
PROT SERPL-MCNC: 4.2 G/DL (ref 6.8–8.8)
PROT SERPL-MCNC: 4.3 G/DL (ref 6.8–8.8)
RBC # BLD AUTO: 3.14 10E12/L (ref 3.8–5.2)
RBC # BLD AUTO: 3.26 10E12/L (ref 3.8–5.2)
RBC # BLD AUTO: 3.27 10E12/L (ref 3.8–5.2)
SALICYLATE QUAL: NEGATIVE
SECOBARBITAL QUAL: NEGATIVE
SODIUM SERPL-SCNC: 138 MMOL/L (ref 133–144)
SODIUM SERPL-SCNC: 140 MMOL/L (ref 133–144)
SODIUM SERPL-SCNC: 140 MMOL/L (ref 133–144)
SODIUM SERPL-SCNC: 141 MMOL/L (ref 133–144)
SODIUM UR-SCNC: 34 MMOL/L
SPECIMEN SOURCE: NORMAL
TALBUTAL QUAL: NEGATIVE
THEOPHYLLINE QUAL: NEGATIVE
THIOPENTAL QUAL: NEGATIVE
TYBAMATE QUAL: NEGATIVE
VALPROIC ACID QUAL: NEGATIVE
WBC # BLD AUTO: 32.1 10E9/L (ref 4–11)
WBC # BLD AUTO: 32.2 10E9/L (ref 4–11)
WBC # BLD AUTO: 32.4 10E9/L (ref 4–11)

## 2019-05-13 PROCEDURE — 00000146 ZZHCL STATISTIC GLUCOSE BY METER IP

## 2019-05-13 PROCEDURE — 84300 ASSAY OF URINE SODIUM: CPT

## 2019-05-13 PROCEDURE — 94644 CONT INHLJ TX 1ST HOUR: CPT

## 2019-05-13 PROCEDURE — 94645 CONT INHLJ TX EACH ADDL HOUR: CPT

## 2019-05-13 PROCEDURE — 20000004 ZZH R&B ICU UMMC

## 2019-05-13 PROCEDURE — 40000275 ZZH STATISTIC RCP TIME EA 10 MIN

## 2019-05-13 PROCEDURE — 99291 CRITICAL CARE FIRST HOUR: CPT | Mod: GC | Performed by: INTERNAL MEDICINE

## 2019-05-13 PROCEDURE — 25000125 ZZHC RX 250: Performed by: STUDENT IN AN ORGANIZED HEALTH CARE EDUCATION/TRAINING PROGRAM

## 2019-05-13 PROCEDURE — 84145 PROCALCITONIN (PCT): CPT

## 2019-05-13 PROCEDURE — C9113 INJ PANTOPRAZOLE SODIUM, VIA: HCPCS

## 2019-05-13 PROCEDURE — 80053 COMPREHEN METABOLIC PANEL: CPT

## 2019-05-13 PROCEDURE — 25800025 ZZH RX 258: Performed by: ANESTHESIOLOGY

## 2019-05-13 PROCEDURE — 25000128 H RX IP 250 OP 636: Performed by: ANESTHESIOLOGY

## 2019-05-13 PROCEDURE — 25000125 ZZHC RX 250: Performed by: ANESTHESIOLOGY

## 2019-05-13 PROCEDURE — 82803 BLOOD GASES ANY COMBINATION: CPT

## 2019-05-13 PROCEDURE — 25000128 H RX IP 250 OP 636

## 2019-05-13 PROCEDURE — 70450 CT HEAD/BRAIN W/O DYE: CPT

## 2019-05-13 PROCEDURE — 82550 ASSAY OF CK (CPK): CPT

## 2019-05-13 PROCEDURE — 82803 BLOOD GASES ANY COMBINATION: CPT | Performed by: STUDENT IN AN ORGANIZED HEALTH CARE EDUCATION/TRAINING PROGRAM

## 2019-05-13 PROCEDURE — 25800025 ZZH RX 258: Performed by: STUDENT IN AN ORGANIZED HEALTH CARE EDUCATION/TRAINING PROGRAM

## 2019-05-13 PROCEDURE — 25800030 ZZH RX IP 258 OP 636: Performed by: STUDENT IN AN ORGANIZED HEALTH CARE EDUCATION/TRAINING PROGRAM

## 2019-05-13 PROCEDURE — 25000128 H RX IP 250 OP 636: Performed by: STUDENT IN AN ORGANIZED HEALTH CARE EDUCATION/TRAINING PROGRAM

## 2019-05-13 PROCEDURE — 82570 ASSAY OF URINE CREATININE: CPT

## 2019-05-13 PROCEDURE — 27211040 ZZH CONTINUOUS NEBULIZER MICRO PUMP

## 2019-05-13 PROCEDURE — 82330 ASSAY OF CALCIUM: CPT

## 2019-05-13 PROCEDURE — 82330 ASSAY OF CALCIUM: CPT | Performed by: STUDENT IN AN ORGANIZED HEALTH CARE EDUCATION/TRAINING PROGRAM

## 2019-05-13 PROCEDURE — 25000125 ZZHC RX 250

## 2019-05-13 PROCEDURE — 25800030 ZZH RX IP 258 OP 636: Performed by: ANESTHESIOLOGY

## 2019-05-13 PROCEDURE — 80053 COMPREHEN METABOLIC PANEL: CPT | Performed by: STUDENT IN AN ORGANIZED HEALTH CARE EDUCATION/TRAINING PROGRAM

## 2019-05-13 PROCEDURE — 83735 ASSAY OF MAGNESIUM: CPT | Performed by: STUDENT IN AN ORGANIZED HEALTH CARE EDUCATION/TRAINING PROGRAM

## 2019-05-13 PROCEDURE — 85027 COMPLETE CBC AUTOMATED: CPT

## 2019-05-13 PROCEDURE — 82140 ASSAY OF AMMONIA: CPT | Performed by: PSYCHIATRY & NEUROLOGY

## 2019-05-13 PROCEDURE — 85027 COMPLETE CBC AUTOMATED: CPT | Performed by: STUDENT IN AN ORGANIZED HEALTH CARE EDUCATION/TRAINING PROGRAM

## 2019-05-13 PROCEDURE — 25000132 ZZH RX MED GY IP 250 OP 250 PS 637: Performed by: STUDENT IN AN ORGANIZED HEALTH CARE EDUCATION/TRAINING PROGRAM

## 2019-05-13 PROCEDURE — 82533 TOTAL CORTISOL: CPT | Performed by: STUDENT IN AN ORGANIZED HEALTH CARE EDUCATION/TRAINING PROGRAM

## 2019-05-13 PROCEDURE — 95951 ZZHC EEG VIDEO EACH 24 HR: CPT | Mod: ZF

## 2019-05-13 PROCEDURE — 83735 ASSAY OF MAGNESIUM: CPT

## 2019-05-13 PROCEDURE — 40000014 ZZH STATISTIC ARTERIAL MONITORING DAILY

## 2019-05-13 PROCEDURE — 84702 CHORIONIC GONADOTROPIN TEST: CPT

## 2019-05-13 PROCEDURE — 83605 ASSAY OF LACTIC ACID: CPT

## 2019-05-13 PROCEDURE — 71045 X-RAY EXAM CHEST 1 VIEW: CPT

## 2019-05-13 PROCEDURE — 82805 BLOOD GASES W/O2 SATURATION: CPT | Performed by: STUDENT IN AN ORGANIZED HEALTH CARE EDUCATION/TRAINING PROGRAM

## 2019-05-13 PROCEDURE — 94003 VENT MGMT INPAT SUBQ DAY: CPT

## 2019-05-13 PROCEDURE — 25000132 ZZH RX MED GY IP 250 OP 250 PS 637: Performed by: ANESTHESIOLOGY

## 2019-05-13 PROCEDURE — 25000131 ZZH RX MED GY IP 250 OP 636 PS 637: Performed by: STUDENT IN AN ORGANIZED HEALTH CARE EDUCATION/TRAINING PROGRAM

## 2019-05-13 RX ORDER — DEXTROSE 50 G/100ML
INJECTION, SOLUTION INTRAVENOUS CONTINUOUS
Status: DISCONTINUED | OUTPATIENT
Start: 2019-05-13 | End: 2019-05-13

## 2019-05-13 RX ORDER — CALCIUM CHLORIDE 100 MG/ML
3 INJECTION INTRAVENOUS; INTRAVENTRICULAR ONCE
Status: DISCONTINUED | OUTPATIENT
Start: 2019-05-13 | End: 2019-05-13 | Stop reason: CLARIF

## 2019-05-13 RX ORDER — POTASSIUM CHLORIDE 7.45 MG/ML
10 INJECTION INTRAVENOUS
Status: DISCONTINUED | OUTPATIENT
Start: 2019-05-13 | End: 2019-05-23

## 2019-05-13 RX ORDER — MAGNESIUM SULFATE HEPTAHYDRATE 40 MG/ML
4 INJECTION, SOLUTION INTRAVENOUS EVERY 4 HOURS PRN
Status: DISCONTINUED | OUTPATIENT
Start: 2019-05-13 | End: 2019-05-13

## 2019-05-13 RX ORDER — POTASSIUM CL/LIDO/0.9 % NACL 10MEQ/0.1L
10 INTRAVENOUS SOLUTION, PIGGYBACK (ML) INTRAVENOUS
Status: DISCONTINUED | OUTPATIENT
Start: 2019-05-13 | End: 2019-05-13

## 2019-05-13 RX ORDER — POTASSIUM CHLORIDE 1.5 G/1.58G
20-40 POWDER, FOR SOLUTION ORAL
Status: DISCONTINUED | OUTPATIENT
Start: 2019-05-13 | End: 2019-05-13

## 2019-05-13 RX ORDER — IPRATROPIUM BROMIDE AND ALBUTEROL SULFATE 2.5; .5 MG/3ML; MG/3ML
3 SOLUTION RESPIRATORY (INHALATION)
Status: DISCONTINUED | OUTPATIENT
Start: 2019-05-13 | End: 2019-05-27

## 2019-05-13 RX ORDER — PIPERACILLIN SODIUM, TAZOBACTAM SODIUM 4; .5 G/20ML; G/20ML
4.5 INJECTION, POWDER, LYOPHILIZED, FOR SOLUTION INTRAVENOUS EVERY 6 HOURS
Status: DISCONTINUED | OUTPATIENT
Start: 2019-05-13 | End: 2019-05-13

## 2019-05-13 RX ORDER — POTASSIUM CHLORIDE 29.8 MG/ML
20 INJECTION INTRAVENOUS
Status: DISCONTINUED | OUTPATIENT
Start: 2019-05-13 | End: 2019-05-23

## 2019-05-13 RX ORDER — MAGNESIUM SULFATE HEPTAHYDRATE 40 MG/ML
4 INJECTION, SOLUTION INTRAVENOUS EVERY 4 HOURS PRN
Status: DISCONTINUED | OUTPATIENT
Start: 2019-05-13 | End: 2019-05-23

## 2019-05-13 RX ORDER — POTASSIUM CHLORIDE 7.45 MG/ML
10 INJECTION INTRAVENOUS
Status: DISCONTINUED | OUTPATIENT
Start: 2019-05-13 | End: 2019-05-13

## 2019-05-13 RX ORDER — FUROSEMIDE 10 MG/ML
60 INJECTION INTRAMUSCULAR; INTRAVENOUS ONCE
Status: COMPLETED | OUTPATIENT
Start: 2019-05-13 | End: 2019-05-13

## 2019-05-13 RX ORDER — CEFTRIAXONE 1 G/1
1 INJECTION, POWDER, FOR SOLUTION INTRAMUSCULAR; INTRAVENOUS EVERY 24 HOURS
Status: DISCONTINUED | OUTPATIENT
Start: 2019-05-13 | End: 2019-05-15

## 2019-05-13 RX ORDER — POTASSIUM CHLORIDE 750 MG/1
20-40 TABLET, EXTENDED RELEASE ORAL
Status: DISCONTINUED | OUTPATIENT
Start: 2019-05-13 | End: 2019-05-13

## 2019-05-13 RX ORDER — POTASSIUM CHLORIDE 29.8 MG/ML
20 INJECTION INTRAVENOUS
Status: DISCONTINUED | OUTPATIENT
Start: 2019-05-13 | End: 2019-05-13

## 2019-05-13 RX ORDER — POTASSIUM CHLORIDE 1.5 G/1.58G
20-40 POWDER, FOR SOLUTION ORAL
Status: DISCONTINUED | OUTPATIENT
Start: 2019-05-13 | End: 2019-05-23

## 2019-05-13 RX ORDER — POTASSIUM CL/LIDO/0.9 % NACL 10MEQ/0.1L
10 INTRAVENOUS SOLUTION, PIGGYBACK (ML) INTRAVENOUS
Status: DISCONTINUED | OUTPATIENT
Start: 2019-05-13 | End: 2019-05-23

## 2019-05-13 RX ORDER — POTASSIUM CHLORIDE 750 MG/1
20-40 TABLET, EXTENDED RELEASE ORAL
Status: DISCONTINUED | OUTPATIENT
Start: 2019-05-13 | End: 2019-05-23

## 2019-05-13 RX ORDER — FUROSEMIDE 10 MG/ML
20 INJECTION INTRAMUSCULAR; INTRAVENOUS ONCE
Status: DISCONTINUED | OUTPATIENT
Start: 2019-05-13 | End: 2019-05-13

## 2019-05-13 RX ADMIN — SODIUM CHLORIDE 10 UNITS/KG/HR: 9 INJECTION, SOLUTION INTRAVENOUS at 06:26

## 2019-05-13 RX ADMIN — VASOPRESSIN 2.4 UNITS/HR: 20 INJECTION INTRAVENOUS at 16:07

## 2019-05-13 RX ADMIN — SODIUM CHLORIDE 10 UNITS/KG/HR: 9 INJECTION, SOLUTION INTRAVENOUS at 04:09

## 2019-05-13 RX ADMIN — HEPARIN SODIUM 5000 UNITS: 5000 INJECTION, SOLUTION INTRAVENOUS; SUBCUTANEOUS at 16:22

## 2019-05-13 RX ADMIN — SODIUM CHLORIDE 10 UNITS/KG/HR: 9 INJECTION, SOLUTION INTRAVENOUS at 12:32

## 2019-05-13 RX ADMIN — HEPARIN SODIUM 5000 UNITS: 5000 INJECTION, SOLUTION INTRAVENOUS; SUBCUTANEOUS at 05:44

## 2019-05-13 RX ADMIN — SODIUM CHLORIDE 10 UNITS/KG/HR: 9 INJECTION, SOLUTION INTRAVENOUS at 16:07

## 2019-05-13 RX ADMIN — POTASSIUM CHLORIDE 20 MEQ: 29.8 INJECTION, SOLUTION INTRAVENOUS at 22:32

## 2019-05-13 RX ADMIN — BISACODYL 10 MG: 10 SUPPOSITORY RECTAL at 00:20

## 2019-05-13 RX ADMIN — SODIUM CHLORIDE 10 UNITS/KG/HR: 9 INJECTION, SOLUTION INTRAVENOUS at 14:24

## 2019-05-13 RX ADMIN — SENNOSIDES AND DOCUSATE SODIUM 2 TABLET: 8.6; 5 TABLET ORAL at 16:21

## 2019-05-13 RX ADMIN — CEFTRIAXONE 1 G: 1 INJECTION, POWDER, FOR SOLUTION INTRAMUSCULAR; INTRAVENOUS at 18:10

## 2019-05-13 RX ADMIN — Medication: at 06:09

## 2019-05-13 RX ADMIN — SODIUM CHLORIDE 10 UNITS/KG/HR: 9 INJECTION, SOLUTION INTRAVENOUS at 02:05

## 2019-05-13 RX ADMIN — POTASSIUM CHLORIDE 20 MEQ: 29.8 INJECTION, SOLUTION INTRAVENOUS at 18:57

## 2019-05-13 RX ADMIN — POTASSIUM CHLORIDE 20 MEQ: 29.8 INJECTION, SOLUTION INTRAVENOUS at 23:32

## 2019-05-13 RX ADMIN — POTASSIUM CHLORIDE 20 MEQ: 29.8 INJECTION, SOLUTION INTRAVENOUS at 17:17

## 2019-05-13 RX ADMIN — Medication: at 00:52

## 2019-05-13 RX ADMIN — SODIUM CHLORIDE 10 UNITS/KG/HR: 9 INJECTION, SOLUTION INTRAVENOUS at 23:12

## 2019-05-13 RX ADMIN — METHYLENE BLUE 1.5 MG/KG/HR: 10 INJECTION INTRAVENOUS at 02:33

## 2019-05-13 RX ADMIN — LEVOTHYROXINE SODIUM 50 MCG: 25 TABLET ORAL at 09:36

## 2019-05-13 RX ADMIN — EPOPROSTENOL SODIUM 20 NG/KG/MIN: 1500000 INJECTION, POWDER, LYOPHILIZED, FOR SOLUTION INTRAVENOUS at 12:32

## 2019-05-13 RX ADMIN — SODIUM CHLORIDE 10 UNITS/KG/HR: 9 INJECTION, SOLUTION INTRAVENOUS at 08:21

## 2019-05-13 RX ADMIN — Medication 0.36 MCG/KG/MIN: at 16:50

## 2019-05-13 RX ADMIN — EPOPROSTENOL SODIUM 20 NG/KG/MIN: 1500000 INJECTION, POWDER, LYOPHILIZED, FOR SOLUTION INTRAVENOUS at 20:18

## 2019-05-13 RX ADMIN — SODIUM CHLORIDE 10 UNITS/KG/HR: 9 INJECTION, SOLUTION INTRAVENOUS at 21:10

## 2019-05-13 RX ADMIN — DEXTROSE MONOHYDRATE: 70 INJECTION, SOLUTION INTRAVENOUS at 07:11

## 2019-05-13 RX ADMIN — ACETAMINOPHEN 975 MG: 325 TABLET, FILM COATED ORAL at 10:59

## 2019-05-13 RX ADMIN — FENTANYL CITRATE 50 MCG: 50 INJECTION, SOLUTION INTRAMUSCULAR; INTRAVENOUS at 06:00

## 2019-05-13 RX ADMIN — PANTOPRAZOLE SODIUM 40 MG: 40 INJECTION, POWDER, LYOPHILIZED, FOR SOLUTION INTRAVENOUS at 09:36

## 2019-05-13 RX ADMIN — FUROSEMIDE 60 MG: 10 INJECTION, SOLUTION INTRAVENOUS at 10:42

## 2019-05-13 RX ADMIN — IPRATROPIUM BROMIDE AND ALBUTEROL SULFATE 3 ML: .5; 3 SOLUTION RESPIRATORY (INHALATION) at 17:05

## 2019-05-13 RX ADMIN — IPRATROPIUM BROMIDE AND ALBUTEROL SULFATE 3 ML: .5; 3 SOLUTION RESPIRATORY (INHALATION) at 12:37

## 2019-05-13 RX ADMIN — Medication 0.4 MCG/KG/MIN: at 06:41

## 2019-05-13 RX ADMIN — CALCIUM GLUCONATE 3 G: 98 INJECTION, SOLUTION INTRAVENOUS at 01:44

## 2019-05-13 RX ADMIN — POTASSIUM CHLORIDE 20 MEQ: 29.8 INJECTION, SOLUTION INTRAVENOUS at 17:21

## 2019-05-13 RX ADMIN — Medication: at 16:10

## 2019-05-13 RX ADMIN — Medication 50 MCG/HR: at 02:43

## 2019-05-13 RX ADMIN — IPRATROPIUM BROMIDE AND ALBUTEROL SULFATE 3 ML: .5; 3 SOLUTION RESPIRATORY (INHALATION) at 20:03

## 2019-05-13 RX ADMIN — EPOPROSTENOL SODIUM 20 NG/KG/MIN: 1500000 INJECTION, POWDER, LYOPHILIZED, FOR SOLUTION INTRAVENOUS at 08:03

## 2019-05-13 RX ADMIN — MIDAZOLAM 15 MG/HR: 5 INJECTION INTRAMUSCULAR; INTRAVENOUS at 03:21

## 2019-05-13 RX ADMIN — SODIUM CHLORIDE 10 UNITS/KG/HR: 9 INJECTION, SOLUTION INTRAVENOUS at 10:17

## 2019-05-13 RX ADMIN — MIDAZOLAM 4 MG: 1 INJECTION INTRAMUSCULAR; INTRAVENOUS at 06:00

## 2019-05-13 RX ADMIN — MIDAZOLAM HYDROCHLORIDE 5 MG: 1 INJECTION, SOLUTION INTRAMUSCULAR; INTRAVENOUS at 03:25

## 2019-05-13 RX ADMIN — METHYLENE BLUE 1 MG/KG/HR: 10 INJECTION INTRAVENOUS at 06:26

## 2019-05-13 RX ADMIN — MAGNESIUM SULFATE IN DEXTROSE 1 G: 10 INJECTION, SOLUTION INTRAVENOUS at 06:38

## 2019-05-13 RX ADMIN — SODIUM CHLORIDE 10 UNITS/KG/HR: 9 INJECTION, SOLUTION INTRAVENOUS at 18:50

## 2019-05-13 RX ADMIN — DEXTROSE MONOHYDRATE 60 ML/HR: 70 INJECTION, SOLUTION INTRAVENOUS at 16:20

## 2019-05-13 RX ADMIN — CHLOROTHIAZIDE SODIUM 500 MG: 500 INJECTION, POWDER, LYOPHILIZED, FOR SOLUTION INTRAVENOUS at 10:26

## 2019-05-13 RX ADMIN — DIATRIZOATE MEGLUMINE AND DIATRIZOATE SODIUM 120 ML: 660; 100 SOLUTION ORAL; RECTAL at 18:47

## 2019-05-13 RX ADMIN — Medication 2 G: at 23:30

## 2019-05-13 RX ADMIN — DEXTROSE MONOHYDRATE 25 G: 25 INJECTION, SOLUTION INTRAVENOUS at 10:20

## 2019-05-13 ASSESSMENT — ACTIVITIES OF DAILY LIVING (ADL)
ADLS_ACUITY_SCORE: 22
ADLS_ACUITY_SCORE: 20

## 2019-05-13 ASSESSMENT — MIFFLIN-ST. JEOR: SCORE: 1326.62

## 2019-05-13 NOTE — CONSULTS
2019:    Social Work: Assessment with Discharge Plan    Patient Name:  Ana Laura Wharton  :  1970  Age:  48 year old  MRN:  [id number]  Risk/Complexity Score:     Completed assessment with:  Chart review, friend Nessa via phone, patient's ex-spouse Gabriel, MICU team, patient's brother Linden, legal referral    Presenting Information   Reason for Referral:  Caregiver issues and Mental illness  Date of Intake:  May 13, 2019  Referral Source:  Nurse  Decision Maker:  Patient  Alternate Decision Maker:  TBD-Mother and brother are living, child is only 16 years old, officially , with no Health Care directive on file.   Health Care Directive:  Not on file; patient unable to complete due to critical status, will go by [employer] policy  Living Situation:  Apartment  Previous Functional Status:  Independent, through struggling the past few years with depression and bipolar.  Patient and family understanding of hospitalization:  Suicide attempt with medication overdose  Cultural/Language/Spiritual Considerations:  English speaking  Adjustment to Illness:  Patient critically ill; per emergency contact/friend, other friends and family members have been contacted.    Physical Health  Reason for Admission:  Overdose  Services Needed/Recommended:  TBD- critically ill    Mental Health/Chemical Dependency  Diagnosis:  Bipolar II, mood disorder, depression, anxiety disorder, suicidal ideations,personality disorder, opioid dependence  Support/Services in Place:  unknown  Services Needed/Recommended:  n/a    Support System  Significant relationship at present time:  Ex-spouse, officially   Family of origin is available for support:  No- has child that lives with ex-spouse, estranged from family  Other support available:  Friends  Gaps in support system:  Limited support  Patient is caregiver to:  None; has 16 year old son who lives with patient's spouse    Provider Information   Primary Care Physician:  Latonia  "Liborio FRIEND   [phone]   Clinic:  [address] [facility] [address]      :  unknown    Financial   Income Source:  Disabled; receiving alimony from her ex-spouse for financial support  Financial Concerns:  unknown  Insurance:    Payor/Plan Subscriber Name Rel Member # Group #   [employer] - [employer]* KEE TINEO  [id number]      [address] [address]       Discharge Plan   Patient and family discharge goal:  TBD- critically ill  Provided education on discharge plan:  NO  Patient agreeable to discharge plan:  n/a  A list of Medicare Certified Facilities was provided to the patient and/or family to encourage patient choice. Patient's choices for facility are:  N/a, critically ill  Will NH provide Skilled rehabilitation or complex medical:  NO  General information regarding anticipated insurance coverage and possible out of pocket cost was discussed. Patient and patient's family are aware patient may incur the cost of transportation to the facility, pending insurance payment: n/a  Barriers to discharge:  Critically ill- goals of care    Discharge Recommendations   Anticipated Disposition:  TBD- critically ill  Transportation Needs:  TBD  Name of Transportation Company and Phone:  n/a    Additional comments   Spoke with patient's friend and emergency contact, Nessa Dalal. He has contacted friends regarding the patient's condition. He is concerned about the [employer] policy as patient is estranged from her mother and knows of past interactions of patient calling her mother a \"raving lunatic\"- he thinks it unwise for her involvement in medical decision making. He will be visiting the patient this evening, he is not aware of a health care directive.     Spoke with patient's ex-spouse Gabriel regarding the patient's condition- he, though is legally - is still the main person in her life and her ongoing caregiver. He has full custody of their nearly 17 year old son, Lewis. Spoke briefly about their " "son- he has been informed about the patient's condition and has previously worked with a therapist and has access to supports. Patient's ex-spouse is concerned about her wellbeing and the stability of patient's mother for decision making with the Next of Kin policy, but is aware that he is no longer [employer]K due to their divorce. He plans on visiting the patient this evening and is in daily contact with patient's brother Linden and remains close friends with him. Gabriel provided examples of recent paranoia and delusional behavior along with some linda since her recent hospitalization.    Spoke with patient's brother Linden regarding the patient. Discussed [employer] and received updated information per the brother regarding patient's mother and concern for her manic and aggressive behavior recently after she was discharged from the hospital for her own medical reasons. Brother reports that he is still maintaining a relationship with his mother and has been in touch daily and \"greatly questions her mental health and decision making abilities\" at this time.     ADDENDUM:  MD advised to assess for patient's mother's mental status regarding decision making ability. Assessed 5/14, not at capacity. Brother Linden confirmed to be decision maker.      YAHAIRA Garcia, [facility]  ICU 4A & 4C   Ph: [phone]  Pager: [phone]    "

## 2019-05-13 NOTE — PROCEDURES
[facility] EEG #-1 (Day 1 of Video-EEG Monitoring)    DATE OF RECORDIN2019    DURATION OF RECORDIN hours, 32 minutes.      CLINICAL SUMMARY:  This diagnostic video EEG monitoring procedure was performed in evaluation of encephalopathy in Kee Wharton.  She was reported to be mechanically ventilated with continuous parenteral sedation by midazolam and fentanyl.  She was reported to be episodically receiving lorazepam, clonazepam, haloperidol, olanzapine and naloxone on this day of monitoring.      TECHNICAL SUMMARY:  This continuous EEG monitoring procedure was performed with 23 scalp electrodes in 10-20 system placements, and additional scalp, precordial and other surface electrodes used for electrical referencing and artifact detection.  Video monitoring was utilized and periodically reviewed by EEG technologists and the physician for electroclinical correlation.     EEG ACTIVITIES DURING COMA:  During ongoing sedated coma, there was no evidence of wake-sleep cycling.  There was continuous low amplitude generalized 1-4 Hz delta slowing with very frequent superimposition of symmetric diffuse 15-20 Hz beta activities.      No interictal epileptiform abnormalities were observed.      No electrographic seizures were recorded.      SUMMARY OF DAY 1 OF VIDEO-EEG MONITORING:    The EEG recording during sedated coma was abnormal due to absence of wake-sleep cycling, with continuous low amplitude generalized delta slowing and superimposed symmetric beta activities.    No interictal epileptiform abnormalities and no electrographic seizures were recorded.    These findings indicate severe generalized cerebral dysfunction, which is etiologically nonspecific.  Clinical correlation is recommended.   David Grullon M.D., Professor of Neurology       D: 2019   T: 2019   MT: PK      Name:     KEE WHARTON   MRN:      [id number]        Account:        [id number]   :      1970            Procedure Date: 05/12/2019      Document: [id number]

## 2019-05-13 NOTE — PROGRESS NOTES
Events of today noted. At one point required 6 vasopressors.  Currently starting methylene blue for vasoplegia as an attempt at correction.  Will fluid bolus.  Urine output minimal.  Await reads for CT scans brain and abdomen.

## 2019-05-13 NOTE — CONSULTS
General Surgery Consult Note     Patient name: Ana Laura Wharton  Date of Service: 5/13/2019  Reason for Consult: ileus   Requesting Physician: MICU      Assessment/Plan:   48 F, critically ill after intentional overdose found to have distended small bowel consistent with adynamic ileus as well as large colonic stool burden.  Currently no evidence of perforation or bowel ischemia.    - No indication for acute surgical intervention  - Continue NGT decompression  - Would initiate bowel regimen per rectum (enemas, suppositories)  - Call with questions    Discussed with staff, Dr. Squires.    Dara Jo  General Surgery PGY2  ------------------------------------------------------------------------  HPI:   47 yo F admitted to MICU after intentional Clonidine and beta blocker overdose on 5/11.  Currently hypotensive requiring multiple pressors.  CT scan demonstrated new ascites, anasarca, bowel wall thickening, and dilated small bowel.  Patient not passing bowel movements since admission, per RN no flatus either.  NG placed yesterday, noted to be looped back into the esophagus so adjusted and now in stomach.  ~50cc gastric fluid out in 6 hr.    PMH:   Past Medical History:   Diagnosis Date     Abnormal Pap smear of cervix 01/09/2019    see problem list     Anxiety      Bipolar disorder (H)      C. difficile colitis      Cervical high risk HPV (human papillomavirus) test positive 01/09/2019    see problem list     Depressive disorder      Essential hypertension 7/8/2015     Gastro-oesophageal reflux disease      Hypothyroidism 4/1/2015     Migraine      Spontaneous pneumothorax     x3, resolved w/ pleurodesis      Suicide attempt (H)      PSH:   Past Surgical History:   Procedure Laterality Date     ARTHROSCOPY KNEE      L knee     CERVIX SURGERY      for pre-cancerous changes     left knee surgery       ORTHOPEDIC SURGERY      L shoulder     THORACIC SURGERY      for pneumothorax, pleurodesis and lobe resection     Meds:    Medications Prior to Admission   Medication Sig Dispense Refill Last Dose     acetaminophen (TYLENOL) 325 MG tablet Take 3 tablets (975 mg) by mouth every 8 hours as needed for mild pain or fever 30 tablet 0 Taking     albuterol (PROAIR HFA/PROVENTIL HFA/VENTOLIN HFA) 108 (90 Base) MCG/ACT inhaler Inhale 2 puffs into the lungs every 6 hours as needed for shortness of breath / dyspnea or wheezing 1 Inhaler 0 Taking     atorvastatin (LIPITOR) 10 MG tablet Take 1 tablet (10 mg) by mouth daily 90 tablet 3 Taking     carvedilol (COREG) 6.25 MG tablet Take 1 tablet (6.25 mg) by mouth 2 times daily (with meals) 180 tablet 3 Taking     ciprofloxacin (CIPRO) 250 MG tablet Take 0.5 tablets (125 mg) by mouth daily For UTI prevention 45 tablet 3 Taking     citalopram (CELEXA) 20 MG tablet Take 1 tablet (20 mg) by mouth daily 30 tablet 3      clindamycin (CLINDAGEL) 1 % topical gel Apply topically 2 times daily   Taking     clonazePAM (KLONOPIN) 1 MG tablet 1 mg bid and 2 mg qhs 120 tablet 0 Taking     cloNIDine (CATAPRES) 0.1 MG tablet Take 1 tablet (0.1 mg) by mouth 2 times daily 180 tablet 3 Taking     cyclobenzaprine (FLEXERIL) 10 MG tablet Take 1 tablet (10 mg) by mouth At Bedtime 90 tablet 0 Taking     eletriptan (RELPAX) 40 MG tablet Take 40 mg by mouth at onset of headache for migraine   Taking     FIBER PO Take 2 tablets by mouth daily   Taking     [] fluconazole (DIFLUCAN) 100 MG tablet Take 1 tablet (100 mg) by mouth daily for 15 days 15 tablet 3 Taking     folic acid (FOLVITE) 400 MCG tablet Take 400 mcg by mouth daily   Taking     hydrOXYzine (ATARAX) 50 MG tablet Take 2 tablets (100 mg) by mouth 2 times daily as needed for anxiety 120 tablet 1      IBUPROFEN PO Take 400-600 mg by mouth every 6 hours as needed for moderate pain   Taking     levothyroxine (SYNTHROID) 50 MCG tablet Take 1 tablet (50 mcg) by mouth daily 90 tablet 3 Taking     loperamide (IMODIUM A-D) 2 MG tablet Take 4 mg by mouth as needed  "for diarrhea   Taking     mirabegron (MYRBETRIQ) 25 MG 24 hr tablet Take 1 tablet (25 mg) by mouth daily 90 tablet 1 Taking     mirtazapine (REMERON) 30 MG tablet Take 2 tablets (60 mg) by mouth At Bedtime 180 tablet 1      Multiple Vitamins-Minerals (WOMENS MULTI VITAMIN & MINERAL PO) Take 1 tablet by mouth daily   Taking     NEW MED    Taking     OLANZapine (ZYPREXA) 20 MG tablet Take half tab (10 mg) by mouth twice daily 90 tablet 0 Taking     Ondansetron (ZOFRAN ODT PO) Take 4 mg by mouth every 8 hours as needed for nausea   Taking     oxybutynin ER (DITROPAN-XL) 5 MG 24 hr tablet Take 1 tablet (5 mg) by mouth daily 90 tablet 3 Taking     pantoprazole (PROTONIX) 40 MG EC tablet Take 1 tablet (40 mg) by mouth every morning (before breakfast) 90 tablet 3 Taking     tolterodine (DETROL LA) 2 MG 24 hr capsule Take 1 capsule (2 mg) by mouth daily 90 capsule 3 Taking     triamcinolone (KENALOG) 0.1 % ointment    Taking     UNABLE TO FIND MEDICATION NAME: NEXAPLON IMPLANT for birth control one year 2017   Taking     Allergies:   Allergies   Allergen Reactions     Amoxicillin-Pot Clavulanate      PN: LW Reaction: Itching, Pruritis     Azithromycin      Other reaction(s): Dermatitis     Cephalexin      PN: LW Reaction: Itching, Pruritis     Compazine [Prochlorperazine] Other (See Comments)     dystonia     Metoclopramide Other (See Comments)     \"I feel like I am crawling out of my skin\"     Minocycline Nausea and Vomiting     PN: DIARRHEA, VOMITING, AND STOMACH CRAMPS     Penicillins Itching     Reglan [Metoclopramide Hcl] Other (See Comments)     Sensation of \"crawling out of skin\"     Restoril [Temazepam]      Thorazine [Chlorpromazine] Other (See Comments)     dystonia     Abilify [Aripiprazole] Rash     Lamotrigine Rash     Severe drug rash - contraindication to receiving again. Skin peeling.      Sulfa Drugs Rash     FamHx:   Family History   Problem Relation Age of Onset     Depression Mother      Lipids Father  "        hyperlipidemia     Macular Degeneration Father      SocHx:   Social History     Socioeconomic History     Marital status:      Spouse name: Not on file     Number of children: Not on file     Years of education: Not on file     Highest education level: Not on file   Occupational History     Not on file   Social Needs     Financial resource strain: Not on file     Food insecurity:     Worry: Not on file     Inability: Not on file     Transportation needs:     Medical: Not on file     Non-medical: Not on file   Tobacco Use     Smoking status: Current Some Day Smoker     Types: Cigarettes     Smokeless tobacco: Never Used   Substance and Sexual Activity     Alcohol use: No     Alcohol/week: 1.2 oz     Types: 2 Glasses of wine per week     Comment: Quit on 09/07/18     Drug use: Yes     Frequency: 0.5 times per week     Types: Marijuana     Sexual activity: Yes     Partners: Male     Birth control/protection: OCP   Lifestyle     Physical activity:     Days per week: Not on file     Minutes per session: Not on file     Stress: Not on file   Relationships     Social connections:     Talks on phone: Not on file     Gets together: Not on file     Attends [employer] service: Not on file     Active member of club or organization: Not on file     Attends meetings of clubs or organizations: Not on file     Relationship status: Not on file     Intimate partner violence:     Fear of current or ex partner: Not on file     Emotionally abused: Not on file     Physically abused: Not on file     Forced sexual activity: Not on file   Other Topics Concern     Parent/sibling w/ CABG, MI or angioplasty before 65F 55M? Not Asked   Social History Narrative     Not on file        ROS: 10-pt ROS negative except as noted above.     Objective:   BP (!) 83/43   Pulse 75   Temp 98.9  F (37.2  C) (Axillary)   Resp 26   SpO2 97%   General - intubated, sedated  HEENT - normocephalic, ETT in place  Cardio - regular rate, regular  rhythm  Pulm - non labored respirations on vent  Abdomen - distended, taut, no surgical scars.  Neuro - Not following commands  Extremities - no lower extremity edema, warm, well-perfused  Skin - no rash or bruising  Psych - unable to perform     Labs:  Cr 0.84  Lact 2.3 (3.5)  WBC 45.1  Hbg 11.9  Plt 186    Imaging:  CT:  IMPRESSION:   1. Nonspecific bowel wall thickening involving the ascending and  transverse colon which may be related to patient's volume status.   Although infectious or inflammatory as well as ischemic (given recent  pressors) is also a consideration.  Negative for pneumatosis or  extraluminal air.  2. Hypervolemia as evident by new moderate volume ascites, marked  mesenteric edema bilateral pleural effusions, and anasarca; new since  5/11/2019.    3. Groundglass and consolidative opacities of the lower lobes which  are likely represent pulmonary edema given patient's volume status,  but an infectious process cannot be excluded.  4. Looping of the enteric tube within the gastric lumen with the tip  looping back into the distal esophagus. Recommend repositioning.  5. Ongoing dilated small bowel without transition point, favor  adynamic ileus.  No pneumatosis.  6. No significant change of the bilobed low-density lesion adjacent  the gastric fundus, indeterminate.  Attention on follow-up.    AXR:  Impression:   1. The gastric tube tip and sidehole projected over the stomach.  2. Worsening bilateral mixed interstitial and airspace opacities,  favored to represent pulmonary edema versus infection and/or  subsegmental atelectasis.  3. Bilateral pleural effusions.

## 2019-05-13 NOTE — CONSULTS
[facility]  NeuroICU/Stroke Consult:     Admission Date: 5/11/2019  Date of Service: 05/13/2019  Current hospital day: Hospital Day: 3      Impression & Recommendations   Ms Ana Laura Wharton is a 48 year old woman with a PMHx significant for bipolar I disorder, anxiety, depression, recurrent UTIs and HTN. She was admitted on 5/11/2019 as a transfer from [facility] for medication overdose as a suicide attempt. Intubated for confusion and profound hypotension. Required multiple pressors to maintain BP. She was noted to have dilated pupils but reactive. She was also noted to have some jerking movements, possible myoclonus. vEEG was orered and versed drip was increased to 15mg/hr. Underwent non-contrast head CT which revealed no evidence of acute intracranial pathology. Currently off versed drip. Per discussion with Epileptologist, vEEG shows generalized slowing with suppressed amplitudes but not evidence of electrographic seizures or epileptiform discharges. Neurological on examination she has equal and reactive pupils but no cough or corneals and is not responding to noxious stimuli. There is concern for hypoxic-anoxic brain injury or watershed infarcts due to her hypotension. There are multiple confounding factors including her medication overdose, leukocytosis and metabolic derangements.     Recommendations:  - MRI/MRA brain when medically stable  - Continue vEEG monitoring  - Ammonia level (ordered for you)  - Continue excellent supportive cares by MICU team    Thanks for involving the NeuroICU team in the care of the patient. We will continue following this patient. Please feel free to contact us should any questions arise.    Patient was seen and discussed with the NeuroICU fellow, Dr Garcia and will be seen by the NeuroICU staff, Dr Rebolledo in AM.     Yanet Pruitt  Neurology Resident, PGY2  Pager: [phone]            HPI   Ms Ana Laura Wharton is a 48 year old woman with a PMHx  significant for bipolar I disorder, anxiety, depression, recurrent UTIs and HTN. She was admitted on 5/11/2019 as a transfer from [facility] for medication overdose as a suicide attempt.     Per H&P, patient had reported taking handfuls of Clonidine 0.1mg and Coreg 6.25mg on 5/11 at 0400 and then every few hours afterwards (last on 1900 on 5/11) in an attempt to kill herself. She estimated that in total it was 180 tablets of each. She reported also taking some other medications but reported that she took them as prescribed (has citalopram, clonazepam, eletriptan, mirtazapine, oxybutynin, Atarax, Synthroid, Tylenol, and statin on her PTA med list). She called her friend on the evening of 5/11 who then brought her to the ED. The patient was reportedly awake with slurred speech and reporting abdominal pain. In the ED she had low blood pressures ranging from 59-77 systolic and 38-52 diastolic.     She went into severe shock with cardiovascular collpase and was intubated due to confusion and profound hypotension. She required multiple pressors including epinephrine, vasopressin, dopamine, norepinephrine, methyl blue and agiotensin 2 blocker.     She was noted to have dilated pupils but reactive. She was also noted to have some jerking movements, possible myoclonus. vEEG was orered and versed drip was increased to 15mg/hr. Prior to EEG patient was noted to be possibly following some commands. NeuroICU team was consulted due to concern for anoxic brain injury.      Past Medical History:   Diagnosis Date     Abnormal Pap smear of cervix 01/09/2019    see problem list     Anxiety      Bipolar disorder (H)      C. difficile colitis      Cervical high risk HPV (human papillomavirus) test positive 01/09/2019    see problem list     Depressive disorder      Essential hypertension 7/8/2015     Gastro-oesophageal reflux disease      Hypothyroidism 4/1/2015     Migraine      Spontaneous pneumothorax     x3, resolved w/ pleurodesis       Suicide attempt (H)      Past Surgical History:   Procedure Laterality Date     ARTHROSCOPY KNEE      L knee     CERVIX SURGERY      for pre-cancerous changes     left knee surgery       ORTHOPEDIC SURGERY      L shoulder     THORACIC SURGERY      for pneumothorax, pleurodesis and lobe resection     Medications:   Current Facility-Administered Medications   Medication     acetaminophen (TYLENOL) tablet 975 mg     angiotensin II (GIAPREZA) 2.5 mg in  mL (adult std) infusion     artificial tears ophthalmic ointment     bisacodyl (DULCOLAX) Suppository 10 mg     calcium gluconate 3 g in D5W 100 mL intermittent infusion     dextrose 50 % injection 25 g     dextrose 50 % injection 25 g     glucose gel 15-30 g    Or     dextrose 50 % injection 25-50 mL    Or     glucagon injection 1 mg     dextrose 50% IV Continuous Infusion Mixture     DOPamine 400 mg in dextrose 5% 250 mL (adult std) - premix     EPINEPHrine (ADRENALIN) 16 mg in sodium chloride 0.9 % 250 mL infusion     epoprostenol (VELETRI) 20 mcg/mL in sterile water inhalation solution     fentaNYL (PF) (SUBLIMAZE) injection  mcg     fentaNYL (SUBLIMAZE) infusion     heparin sodium injection 5,000 Units     hypromellose-dextran (ARTIFICAL TEARS) 0.1-0.3 % ophthalmic solution 1 drop     insulin (regular) (HumuLIN R/NovoLIN R) 1 unit/mL injection 49 Units     insulin regular (NovoLIN R) 10 units/mL in NS INFUSION     ipratropium - albuterol 0.5 mg/2.5 mg/3 mL (DUONEB) neb solution 3 mL     levothyroxine (SYNTHROID/LEVOTHROID) tablet 50 mcg     magnesium sulfate 4 g in 100 mL sterile water (premade)     methylene blue 1 % 200 mg in D5W 500 mL infusion     midazolam (VERSED) drip - ADULT 100 mg/100 mL in NS PRE-MIX     midazolam (VERSED) injection 2-4 mg     naloxone (NARCAN) injection 0.1-0.4 mg     naloxone (NARCAN) injection 5 mg     No lozenges or gum should be given while patient on BIPAP/AVAPS/AVAPS AE     norepinephrine (LEVOPHED) 16 mg in   "mL infusion     pantoprazole (PROTONIX) 40 mg IV push injection     Patient may continue current oral medications     phenylephrine (LETA-SYNEPHRINE) 200 mg in sodium chloride 0.9 % 250 mL infusion     piperacillin-tazobactam (ZOSYN) 4.5 g vial to attach to  mL bag     potassium chloride (KLOR-CON) Packet 20-40 mEq     potassium chloride 10 mEq in 100 mL intermittent infusion with 10 mg lidocaine     potassium chloride 10 mEq in 100 mL sterile water intermittent infusion (premix)     potassium chloride 20 mEq in 50 mL intermittent infusion     potassium chloride ER (K-DUR/KLOR-CON M) CR tablet 20-40 mEq     potassium phosphate 15 mmol in D5W 250 mL intermittent infusion     potassium phosphate 20 mmol in D5W 250 mL intermittent infusion     potassium phosphate 20 mmol in D5W 500 mL intermittent infusion     potassium phosphate 25 mmol in D5W 500 mL intermittent infusion     senna-docusate (SENOKOT-S/PERICOLACE) 8.6-50 MG per tablet 1 tablet    Or     senna-docusate (SENOKOT-S/PERICOLACE) 8.6-50 MG per tablet 2 tablet     vasopressin (VASOSTRICT) 40 Units in D5W 40 mL infusion     Allergies:   Allergies   Allergen Reactions     Amoxicillin-Pot Clavulanate      PN: LW Reaction: Itching, Pruritis     Azithromycin      Other reaction(s): Dermatitis     Cephalexin      PN: LW Reaction: Itching, Pruritis     Compazine [Prochlorperazine] Other (See Comments)     dystonia     Metoclopramide Other (See Comments)     \"I feel like I am crawling out of my skin\"     Minocycline Nausea and Vomiting     PN: DIARRHEA, VOMITING, AND STOMACH CRAMPS     Penicillins Itching     Reglan [Metoclopramide Hcl] Other (See Comments)     Sensation of \"crawling out of skin\"     Restoril [Temazepam]      Thorazine [Chlorpromazine] Other (See Comments)     dystonia     Abilify [Aripiprazole] Rash     Lamotrigine Rash     Severe drug rash - contraindication to receiving again. Skin peeling.      Sulfa Drugs Rash   .    Family History: "   Family History   Problem Relation Age of Onset     Depression Mother      Lipids Father         hyperlipidemia     Macular Degeneration Father      Social History:   Social History     Tobacco Use     Smoking status: Current Some Day Smoker     Types: Cigarettes     Smokeless tobacco: Never Used   Substance Use Topics     Alcohol use: No     Alcohol/week: 1.2 oz     Types: 2 Glasses of wine per week     Comment: Quit on 09/07/18     ROS:  Review of systems not obtained due to patient factors - mental status and intubation         Objective   Temp:  [98.8  F (37.1  C)-100.5  F (38.1  C)] 99.2  F (37.3  C)  Heart Rate:  [55-93] 80  Resp:  [25-39] 28  MAP:  [55 mmHg-278 mmHg] 66 mmHg  Arterial Line BP: ()/() 101/52  FiO2 (%):  [75 %-100 %] 100 %  SpO2:  [84 %-100 %] 100 %    Ventilation Mode: CMV/AC  (Continuous Mandatory Ventilation/ Assist Control)  FiO2 (%): 100 %  Rate Set (breaths/minute): 28 breaths/min  Tidal Volume Set (mL): (S) 400 mL  PEEP (cm H2O): (S) 10 cmH2O  Oxygen Concentration (%): 100 %  Resp: 28    I/O last 3 completed shifts:  In: 22184.45 [I.V.:13737.45; NG/GT:60; IV Piggyback:1000]  Out: 578 [Urine:478; Emesis/NG output:100]    Physical Exam    General: Intubated on mechanical ventilation, NAD  CVS: RRR, S1, S2, no m/r/g  Resp: Clear to ausculation, no wheezes or creackles  GI: Soft, non-distended    Neuro: Intubated, off midazolam, on fentanyl. Pupils 3mm equal and reactive bilaterally. No corneals or VOR appreciated. No cough appreciated. Notable adduction of LUE/LLE with I/O flushing. Otherwise no responses to noxious stimuli. Face appears symmetric. Reflexes 2+ in biceps and brachioradialis, +1 in patellars with mute toes bilaterally. Tone within normal limits. Remainder of neurological exam could not be elicited due to mental status.       Labs and Imaging   BMP  Recent Labs   Lab 05/13/19  1146 05/13/19  0512 05/12/19  2212 05/12/19  1912    141 144 143   POTASSIUM 3.4  3.7 3.7 3.6   CHLORIDE 107 107 112* 113*   CO2 29 28 26 22   BUN 17 17 19 20   CR 0.91 0.87 0.84 0.90   ISAURO 7.5* 8.3* 7.2* 8.2*     CBC  Recent Labs   Lab 05/13/19  1146 05/13/19  0512 05/12/19 1912 05/12/19  1619 05/12/19  0435   WBC 32.4* 32.1* 45.1*  --  43.2*   HGB 9.6* 9.6* 11.9  --  11.8   * 110* 186 197 253     COAGS    Recent Labs   Lab 05/12/19  1912 05/11/19  2018   INR 1.46* 1.07     ABG    Recent Labs   Lab 05/13/19  1145 05/13/19  0923 05/13/19  0512 05/13/19  0220   PH 7.30* 7.32* 7.29* 7.42   PCO2 59* 56* 60* 44   PO2 69* 47* 101 60*   HCO3 29* 29* 28 28     IMAGING:   CT HEAD W/O CONTRAST 5/13/2019 4:56 AM  Impression: No acute intracranial pathology.

## 2019-05-13 NOTE — PROGRESS NOTES
D/I: Pt started on methylene blue to assist with vasoconstriction.  Weaning down dopamine and epi.  Pt pupils unequal,dilated and very sluggish at 0000. MD aware neurology consulted.  Increasing PEEP (5-14), TV and RR throughout the night as pt is developing pulmonary edema and ARDS. Ca replaced. Suppository given and depacted.    A/P: Dopamine and epi weaned off.  Levophed to 0.4mcg and vaso 2.4units, weaning angiotensin and methylene blue per pharmacy reqs.  CT unremarkable.  Pt clinching jaw with rigidity since 0000 and intermittent roving eyes with eye flickering.  Cont EEG.  Additional versed and fentanyl given per neuro without effect.  Continues on high dose insulin and D50. BS q1hr.  HCO3 stopped. Mg replaced. Small mucoid stool this am.  Abd remains distended and firm. MICU ordered flolan this am and decreased PEEP to 10.  Decrease versed to evaluate neuro status.

## 2019-05-13 NOTE — PROGRESS NOTES
Events of last night - we were able to wean off Dopamine, and epinephrine, Bring levophed down significantly.  Difficulties is her mental status changed with pupils dilating and losing reflexes.  Thoughts that there was seizure like activity not noted on EEG. Neurology called.  Resp status worsening.  PEEP to 14.  Her status is tenuous.  Urine output is currently zero.  Will attempt lasix

## 2019-05-13 NOTE — PROGRESS NOTES
MICU Progress Note  Ana Laura Wharton MRN: [id number]  Age: 48 year old, : 1970  Date of Admission:2019  Primary care provider: Liborio Lincoln            Assessment and Plan  Ana Laura Wharton is a 48 year old female with a PMHx significant for recurrent UTIs, HTN, bipolar I, and anxiety/depression who was transferred from [facility] ED for medication overdose as a suicide attempt.  Now intubated and on pressors due to vasoplegia 2/2 clonidine and coreg overdose.  With unclear mental status at this time.      Neurologic  # Concern for Anoxic Brain Injury: Exam concerning for loss of cortical inhibition with possible posturing and clonus.  Also had dilated non-reactive pupils, but this has improved with weaning of sedation.  EEG without seizure activity.  Currently unresponsive despite sedation withdrawal.   - Neuro ICU consulted  - Follow up ammonia  - On video EEG  - MRI/MRA when medically stable    Sedation: Weaned versed and fentanyl to clarify mental status    #Suicide attempt  #Bipolar I, anxiety, depression  - Psych consult when medically stable   - Holding PTA citalopram 20mg every day, Zyprexa 10mg BID, mirtazapine 60mg QPM,   - Sitter   - Continued clonazepam 1mg TID (takes 1mg BD and 2mg at bedtime), hydroxyzine 100mg BID PRN      #EtOH Abuse: Heavy drinking recently reports  - Consider benzo for any seizure activity  - On EEG    Cardiovascular  #Clonidine/carvedilol overdose:   #Hypotension, lactic acidosis:  Patient reported taking 180 tabs total of both Coreg 6.25mg and clonidine 0.1mg on t 7pm; d/w poison control; s/p 10mg Narcan x2 and insulin gtt, intralipid, norepi, epi, phenyl, vasopressin, dopamine, and angiotensin 2.  Able to decreased Epi significantly after starting AT2.  Overnight  received methylene blue and additional fluids.  Lactate normalized.   weaned off AT2 and down to Epi and Vaso.   - ECHO with normal EF   - Stopped methylene  blue       Checking G6PD to verify metabolic profile  - Cont insulin and D50gtt until off pressors  - Wean pressors as able    #Prolonged QTc  Patient presented w/ QTc of 507, has now decreased to 468    Respiratory  #Acute hypoxic/hypercapnic respiratory failure requiring intubation:  #Concern for ARDS: Has bilateral infiltrates but volume overload may present alterative cause or exacerbating factor.    - Inhaled flolan  - Diuresis as below   - Decreasing FiO2 as able    Gastrointestinal  #Elevated AST:  #Elevated ALT: Likely related to alcohol use, lower than would expect from shock.    - CTM     #Ileus/constipation  CT abd/pelvis w/ con showed large stool burden, ileus but could not r/o SBO, and a bilobed low-density lesion along the gastric fundus (unchanged from 7/2018), duplication cyst vs neoplasm  - PO gastrografin today  - Monitor stool output    #Pancreatitis   #Hx of alcohol induced pancreatitis   Lipase 1,100; per chart review patient was c/o abdominal pain prior to intubation, CT abd/pelvis w/ con showed no pancreatitis.  Per friend was drinking heavily prior to admission.  Suspect there is component of pancreatitis at this time.  But other issues will take precedence at this time.  - s/p extensive fluid resuscitation, must balance fluid balance at this point    # Nutrition:   - NPO except meds, ice chips, water for now   - Will consider nutrition consult pending resolution of ileus    Infectious Disease   # Concern for aspiration/CAP:   # Elevated procalcitonin   UA not convincing for infection; CXR w/o infiltrate, procal wnl, but has now increased.  Afebrile, but leukocytosis continues.  Could be sign of infection or anoxic brain injury and spilling from central release.     - See cultures below  - CTX for now CAP coverage    Antibiotics:  Cefepime 5/11-12  Flagyl 5/11-12  Ceftriaxone 5/13-present    Cultures:  MRSA nares 5/12 negative   UC 5/12 NGTD   Blood cultures: 5/11 NGTD  UC 5/11 >100k colonies  coag - [facility] (susceptabilities pending)    Hematology  #Leukocytosis  Reactive vs infectious  - See ID section  - CTM    #Right internal jugular thrombus: suspected line associated 2/2 attempted line placement at OSH.    - Defer therapeutic AC for now  - Ppx dosed heparin  - Will trend Q48 hour US, next due 5/14    #Acute Normocytic Anemia:  Suspect dilutional, drop correlates with fluids and platelets also fell.  WBC elevated can be explained by stress response.  No evidence of bleeding currently  - CTM    # Thrombocytopenia: Occurred in setting of massive fluid resuscitation.  Suspect largely dilutional, correlates with hgb    - Low HIT score  - CTM    Endocrine  #Glucose  - Insulin and D50 as above    #Hypothyroidism   - Cont Synthroid     Renal/Electolytes/FEN  # Oliguria and EDUARDO:    # Volume overload: BUN and creatinine mildly elevated on presentation.  FENa 0.1.  However UA showed granular casts and with hypotension suspect ATN.    - Strict I/O  - Napoles in  - If UOP decreasing and abd more firm consider bladder pressure check  - Diuresis today, gave 60mg IV lasix and 500mg diuril, will repeat tonight pending I/O trend    #Overactive bladder:  - Cont oxybutynin, hold mirabegron     #Frequent UTIs:  - Holding cipro 125mg every day for UTI ppx given prolonged QT   - Currently on CTX as above    ACID/BASE/ELECTROLYTES:   #Hypokalemia:  - With diuresis  - On protocol     VOLUME STATUS: Hypervolemic, net up by nearly 13L this admission     Skin Care  #Pressure injury coccyx  - Turns per nursing    PPX:  DVT: heparin (has clot, but holding off on full strength AC as above. GI: PPI  FEN: NPO, consider TF if to remain intubated and ileus resolving   Consults: Neuro ICU, SW, Pharmacy  Tubes/Lines/Drains: Reviewed    CODE: FULL  Family: Ex  Gabriel was listed as emergency contact: [phone], boyfriend Nessa has been present at bedside [phone].  Mother is reportedly? estranged (per SO), but would like to be  decision maker [phone].  Has 16 year old son who she has not spoken with and Brother Linden [phone].    Per SW consult.  Brother Linden will be decision maker for now  Dispo: ICU pending improved neuro status, pressor and vent weaning.       Patient discussed with staff attending, Dr. Alcantar.  Please feel free to page with questions.    Duane Hill MD    Internal Medicine PGY-1  Pager: [phone]         Interval History   Decreased pressor requirement overnight.  Had IO placed for additional fluids.  Started on methylene blue.  Mental status unclear.  Not interactive or responsive.  Concern for seizure based on jaw clenching and eye movements, however EEG without evidence of this.          Vitals  Temp:  [98.8  F (37.1  C)-100.5  F (38.1  C)] 99.2  F (37.3  C)  Heart Rate:  [55-93] 80  Resp:  [25-39] 28  MAP:  [55 mmHg-278 mmHg] 61 mmHg  Arterial Line BP: ()/() 95/45  FiO2 (%):  [75 %-100 %] 80 %  SpO2:  [84 %-100 %] 99 %        Ventilator  Ventilation Mode: CMV/AC  (Continuous Mandatory Ventilation/ Assist Control)  FiO2 (%): 80 %  Rate Set (breaths/minute): 28 breaths/min  Tidal Volume Set (mL): (S) 400 mL  PEEP (cm H2O): (S) 10 cmH2O  Oxygen Concentration (%): 100 %  Resp: 28       Intake/Output  Date 05/13/19 0700 - 05/14/19 0659   Shift 7770-1503 7236-5329 8022-8357 24 Hour Total   INTAKE   I.V. 1424.59 396.1  1820.69   NG/GT 70   70   Shift Total(mL/kg) 1494.59(21.6) 396.1(5.72)  1890.69(27.32)   OUTPUT   Urine 2160   2160   Emesis/NG output 50 0  50   Shift Total(mL/kg) 2210(31.94) 0(0)  2210(31.94)   Weight (kg) 69.2 69.2 69.2 69.2            Physical Exam    Gen: Intubated, unresponsive except to pain of flushing I/O line  HEENT:AT/ NC, pupils dilated and non reactive bilaterally,  sclera anicteric  PULM/THORAX:Coarse breath sounds anteriorly, crackles present  CV:RRR, S1 and S2 appreciated, no extra heart sounds, murmurs or rub auscultated  ABD: Distended and tympanic, no HSM  appreciated.  EXT: Warm,  Edema present in hands and feet  SKIN: Capillary refill normal  NEURO: Unresponsive, pupils dilated, no gag reflex to suction, not withdrawing from pain, but does react to I/O flush, >10 beats clonus bilaterally

## 2019-05-14 ENCOUNTER — ALLIED HEALTH/NURSE VISIT (OUTPATIENT)
Dept: NEUROLOGY | Facility: CLINIC | Age: 49
DRG: 987 | End: 2019-05-14
Attending: PSYCHIATRY & NEUROLOGY
Payer: COMMERCIAL

## 2019-05-14 ENCOUNTER — APPOINTMENT (OUTPATIENT)
Dept: GENERAL RADIOLOGY | Facility: CLINIC | Age: 49
DRG: 987 | End: 2019-05-14
Attending: STUDENT IN AN ORGANIZED HEALTH CARE EDUCATION/TRAINING PROGRAM
Payer: COMMERCIAL

## 2019-05-14 ENCOUNTER — APPOINTMENT (OUTPATIENT)
Dept: ULTRASOUND IMAGING | Facility: CLINIC | Age: 49
DRG: 987 | End: 2019-05-14
Attending: STUDENT IN AN ORGANIZED HEALTH CARE EDUCATION/TRAINING PROGRAM
Payer: COMMERCIAL

## 2019-05-14 DIAGNOSIS — I67.83 PRES (POSTERIOR REVERSIBLE ENCEPHALOPATHY SYNDROME): Primary | ICD-10-CM

## 2019-05-14 LAB
ALBUMIN SERPL-MCNC: 2 G/DL (ref 3.4–5)
ALBUMIN SERPL-MCNC: 2 G/DL (ref 3.4–5)
ALBUMIN SERPL-MCNC: 2.1 G/DL (ref 3.4–5)
ALBUMIN SERPL-MCNC: 2.2 G/DL (ref 3.4–5)
ALP SERPL-CCNC: 63 U/L (ref 40–150)
ALP SERPL-CCNC: 69 U/L (ref 40–150)
ALP SERPL-CCNC: 75 U/L (ref 40–150)
ALP SERPL-CCNC: 86 U/L (ref 40–150)
ALT SERPL W P-5'-P-CCNC: 44 U/L (ref 0–50)
ALT SERPL W P-5'-P-CCNC: 46 U/L (ref 0–50)
ALT SERPL W P-5'-P-CCNC: 58 U/L (ref 0–50)
ALT SERPL W P-5'-P-CCNC: 66 U/L (ref 0–50)
ANION GAP SERPL CALCULATED.3IONS-SCNC: 1 MMOL/L (ref 3–14)
ANION GAP SERPL CALCULATED.3IONS-SCNC: 2 MMOL/L (ref 3–14)
ANION GAP SERPL CALCULATED.3IONS-SCNC: 4 MMOL/L (ref 3–14)
ANION GAP SERPL CALCULATED.3IONS-SCNC: 9 MMOL/L (ref 6–17)
ANION GAP SERPL CALCULATED.3IONS-SCNC: <1 MMOL/L (ref 3–14)
AST SERPL W P-5'-P-CCNC: 120 U/L (ref 0–45)
AST SERPL W P-5'-P-CCNC: 123 U/L (ref 0–45)
AST SERPL W P-5'-P-CCNC: 143 U/L (ref 0–45)
AST SERPL W P-5'-P-CCNC: 166 U/L (ref 0–45)
BACTERIA SPEC CULT: ABNORMAL
BASE EXCESS BLDA CALC-SCNC: 10.3 MMOL/L
BASE EXCESS BLDA CALC-SCNC: 10.4 MMOL/L
BASE EXCESS BLDA CALC-SCNC: 10.7 MMOL/L
BASE EXCESS BLDA CALC-SCNC: 11.4 MMOL/L
BASE EXCESS BLDA CALC-SCNC: 8.9 MMOL/L
BILIRUB SERPL-MCNC: 0.5 MG/DL (ref 0.2–1.3)
BILIRUB SERPL-MCNC: 0.7 MG/DL (ref 0.2–1.3)
BILIRUB SERPL-MCNC: 1 MG/DL (ref 0.2–1.3)
BILIRUB SERPL-MCNC: 1 MG/DL (ref 0.2–1.3)
BUN SERPL-MCNC: 10 MG/DL (ref 7–30)
BUN SERPL-MCNC: 10 MG/DL (ref 7–30)
BUN SERPL-MCNC: 12 MG/DL (ref 7–30)
BUN SERPL-MCNC: 12 MG/DL (ref 7–30)
BUN SERPL-MCNC: 21 MG/DL (ref 5–24)
CA-I BLD-SCNC: 5.2 MG/DL (ref 4.4–5.2)
CALCIUM SERPL-MCNC: 7.3 MG/DL (ref 8.5–10.1)
CALCIUM SERPL-MCNC: 7.5 MG/DL (ref 8.5–10.1)
CHLORIDE BLD-SCNC: 104 MMOL/L (ref 94–109)
CHLORIDE SERPL-SCNC: 102 MMOL/L (ref 94–109)
CHLORIDE SERPL-SCNC: 96 MMOL/L (ref 94–109)
CHLORIDE SERPL-SCNC: 97 MMOL/L (ref 94–109)
CHLORIDE SERPL-SCNC: 97 MMOL/L (ref 94–109)
CO2 BLD-SCNC: 21 MMOL/L (ref 20–32)
CO2 SERPL-SCNC: 33 MMOL/L (ref 20–32)
CO2 SERPL-SCNC: 37 MMOL/L (ref 20–32)
CO2 SERPL-SCNC: 37 MMOL/L (ref 20–32)
CO2 SERPL-SCNC: 38 MMOL/L (ref 20–32)
CREAT BLD-MCNC: 0.8 MG/DL (ref 0.52–1.04)
CREAT SERPL-MCNC: 0.65 MG/DL (ref 0.52–1.04)
CREAT SERPL-MCNC: 0.66 MG/DL (ref 0.52–1.04)
CREAT SERPL-MCNC: 0.72 MG/DL (ref 0.52–1.04)
CREAT SERPL-MCNC: 0.73 MG/DL (ref 0.52–1.04)
ERYTHROCYTE [DISTWIDTH] IN BLOOD BY AUTOMATED COUNT: 14.7 % (ref 10–15)
ERYTHROCYTE [DISTWIDTH] IN BLOOD BY AUTOMATED COUNT: 14.8 % (ref 10–15)
ERYTHROCYTE [DISTWIDTH] IN BLOOD BY AUTOMATED COUNT: 14.9 % (ref 10–15)
GFR SERPL CREATININE-BSD FRML MDRD: 77 ML/MIN/{1.73_M2}
GFR SERPL CREATININE-BSD FRML MDRD: >90 ML/MIN/{1.73_M2}
GLUCOSE BLD-MCNC: 252 MG/DL (ref 70–99)
GLUCOSE BLDC GLUCOMTR-MCNC: 107 MG/DL (ref 70–99)
GLUCOSE BLDC GLUCOMTR-MCNC: 110 MG/DL (ref 70–99)
GLUCOSE BLDC GLUCOMTR-MCNC: 117 MG/DL (ref 70–99)
GLUCOSE BLDC GLUCOMTR-MCNC: 135 MG/DL (ref 70–99)
GLUCOSE BLDC GLUCOMTR-MCNC: 67 MG/DL (ref 70–99)
GLUCOSE BLDC GLUCOMTR-MCNC: 70 MG/DL (ref 70–99)
GLUCOSE BLDC GLUCOMTR-MCNC: 72 MG/DL (ref 70–99)
GLUCOSE BLDC GLUCOMTR-MCNC: 73 MG/DL (ref 70–99)
GLUCOSE BLDC GLUCOMTR-MCNC: 75 MG/DL (ref 70–99)
GLUCOSE BLDC GLUCOMTR-MCNC: 78 MG/DL (ref 70–99)
GLUCOSE BLDC GLUCOMTR-MCNC: 78 MG/DL (ref 70–99)
GLUCOSE BLDC GLUCOMTR-MCNC: 80 MG/DL (ref 70–99)
GLUCOSE BLDC GLUCOMTR-MCNC: 80 MG/DL (ref 70–99)
GLUCOSE BLDC GLUCOMTR-MCNC: 82 MG/DL (ref 70–99)
GLUCOSE BLDC GLUCOMTR-MCNC: 83 MG/DL (ref 70–99)
GLUCOSE BLDC GLUCOMTR-MCNC: 84 MG/DL (ref 70–99)
GLUCOSE BLDC GLUCOMTR-MCNC: 97 MG/DL (ref 70–99)
GLUCOSE BLDC GLUCOMTR-MCNC: 98 MG/DL (ref 70–99)
GLUCOSE SERPL-MCNC: 111 MG/DL (ref 70–99)
GLUCOSE SERPL-MCNC: 76 MG/DL (ref 70–99)
GLUCOSE SERPL-MCNC: 78 MG/DL (ref 70–99)
GLUCOSE SERPL-MCNC: 81 MG/DL (ref 70–99)
HCO3 BLD-SCNC: 35 MMOL/L (ref 21–28)
HCO3 BLD-SCNC: 36 MMOL/L (ref 21–28)
HCO3 BLD-SCNC: 37 MMOL/L (ref 21–28)
HCO3 BLD-SCNC: 38 MMOL/L (ref 21–28)
HCO3 BLD-SCNC: 38 MMOL/L (ref 21–28)
HCT VFR BLD AUTO: 24.2 % (ref 35–47)
HCT VFR BLD AUTO: 26.1 % (ref 35–47)
HCT VFR BLD AUTO: 27.4 % (ref 35–47)
HGB BLD-MCNC: 7.7 G/DL (ref 11.7–15.7)
HGB BLD-MCNC: 8.4 G/DL (ref 11.7–15.7)
HGB BLD-MCNC: 8.9 G/DL (ref 11.7–15.7)
INTERPRETATION ECG - MUSE: NORMAL
LIPASE SERPL-CCNC: 161 U/L (ref 73–393)
LMWH PPP CHRO-ACNC: 0.35 IU/ML
Lab: ABNORMAL
MAGNESIUM SERPL-MCNC: 1.7 MG/DL (ref 1.6–2.3)
MAGNESIUM SERPL-MCNC: 1.7 MG/DL (ref 1.6–2.3)
MAGNESIUM SERPL-MCNC: 2.3 MG/DL (ref 1.6–2.3)
MCH RBC QN AUTO: 29.3 PG (ref 26.5–33)
MCHC RBC AUTO-ENTMCNC: 31.8 G/DL (ref 31.5–36.5)
MCHC RBC AUTO-ENTMCNC: 32.2 G/DL (ref 31.5–36.5)
MCHC RBC AUTO-ENTMCNC: 32.5 G/DL (ref 31.5–36.5)
MCV RBC AUTO: 90 FL (ref 78–100)
MCV RBC AUTO: 91 FL (ref 78–100)
MCV RBC AUTO: 92 FL (ref 78–100)
O2/TOTAL GAS SETTING VFR VENT: 45 %
O2/TOTAL GAS SETTING VFR VENT: 45 %
O2/TOTAL GAS SETTING VFR VENT: 50 %
O2/TOTAL GAS SETTING VFR VENT: 60 %
O2/TOTAL GAS SETTING VFR VENT: 60 %
PCO2 BLD: 53 MM HG (ref 35–45)
PCO2 BLD: 56 MM HG (ref 35–45)
PCO2 BLD: 59 MM HG (ref 35–45)
PCO2 BLD: 62 MM HG (ref 35–45)
PCO2 BLD: 72 MM HG (ref 35–45)
PH BLD: 7.33 PH (ref 7.35–7.45)
PH BLD: 7.39 PH (ref 7.35–7.45)
PH BLD: 7.41 PH (ref 7.35–7.45)
PH BLD: 7.41 PH (ref 7.35–7.45)
PH BLD: 7.44 PH (ref 7.35–7.45)
PHOSPHATE SERPL-MCNC: 1.7 MG/DL (ref 2.5–4.5)
PHOSPHATE SERPL-MCNC: 2.4 MG/DL (ref 2.5–4.5)
PHOSPHATE SERPL-MCNC: 4 MG/DL (ref 2.5–4.5)
PLATELET # BLD AUTO: 106 10E9/L (ref 150–450)
PLATELET # BLD AUTO: 110 10E9/L (ref 150–450)
PLATELET # BLD AUTO: 110 10E9/L (ref 150–450)
PO2 BLD: 157 MM HG (ref 80–105)
PO2 BLD: 167 MM HG (ref 80–105)
PO2 BLD: 51 MM HG (ref 80–105)
PO2 BLD: 69 MM HG (ref 80–105)
PO2 BLD: 78 MM HG (ref 80–105)
POTASSIUM BLD-SCNC: 3.9 MMOL/L (ref 3.4–5.3)
POTASSIUM SERPL-SCNC: 2.9 MMOL/L (ref 3.4–5.3)
POTASSIUM SERPL-SCNC: 3.2 MMOL/L (ref 3.4–5.3)
PROT SERPL-MCNC: 4.1 G/DL (ref 6.8–8.8)
PROT SERPL-MCNC: 4.3 G/DL (ref 6.8–8.8)
PROT SERPL-MCNC: 4.4 G/DL (ref 6.8–8.8)
PROT SERPL-MCNC: 4.5 G/DL (ref 6.8–8.8)
RADIOLOGIST FLAGS: ABNORMAL
RBC # BLD AUTO: 2.63 10E12/L (ref 3.8–5.2)
RBC # BLD AUTO: 2.87 10E12/L (ref 3.8–5.2)
RBC # BLD AUTO: 3.04 10E12/L (ref 3.8–5.2)
SODIUM BLD-SCNC: 134 MMOL/L (ref 133–144)
SODIUM SERPL-SCNC: 135 MMOL/L (ref 133–144)
SODIUM SERPL-SCNC: 135 MMOL/L (ref 133–144)
SODIUM SERPL-SCNC: 137 MMOL/L (ref 133–144)
SODIUM SERPL-SCNC: 138 MMOL/L (ref 133–144)
SPECIMEN SOURCE: ABNORMAL
WBC # BLD AUTO: 19.2 10E9/L (ref 4–11)
WBC # BLD AUTO: 22.9 10E9/L (ref 4–11)
WBC # BLD AUTO: 27.6 10E9/L (ref 4–11)

## 2019-05-14 PROCEDURE — 25000132 ZZH RX MED GY IP 250 OP 250 PS 637: Performed by: STUDENT IN AN ORGANIZED HEALTH CARE EDUCATION/TRAINING PROGRAM

## 2019-05-14 PROCEDURE — 25000131 ZZH RX MED GY IP 250 OP 636 PS 637: Performed by: STUDENT IN AN ORGANIZED HEALTH CARE EDUCATION/TRAINING PROGRAM

## 2019-05-14 PROCEDURE — 80053 COMPREHEN METABOLIC PANEL: CPT

## 2019-05-14 PROCEDURE — 82803 BLOOD GASES ANY COMBINATION: CPT | Performed by: STUDENT IN AN ORGANIZED HEALTH CARE EDUCATION/TRAINING PROGRAM

## 2019-05-14 PROCEDURE — 83690 ASSAY OF LIPASE: CPT

## 2019-05-14 PROCEDURE — 95951 ZZHC EEG VIDEO EACH 24 HR: CPT | Mod: ZF

## 2019-05-14 PROCEDURE — 93971 EXTREMITY STUDY: CPT | Mod: RT

## 2019-05-14 PROCEDURE — 40000014 ZZH STATISTIC ARTERIAL MONITORING DAILY

## 2019-05-14 PROCEDURE — 94003 VENT MGMT INPAT SUBQ DAY: CPT

## 2019-05-14 PROCEDURE — 25000128 H RX IP 250 OP 636: Performed by: ANESTHESIOLOGY

## 2019-05-14 PROCEDURE — 25000125 ZZHC RX 250: Performed by: ANESTHESIOLOGY

## 2019-05-14 PROCEDURE — 85520 HEPARIN ASSAY: CPT | Performed by: STUDENT IN AN ORGANIZED HEALTH CARE EDUCATION/TRAINING PROGRAM

## 2019-05-14 PROCEDURE — 85027 COMPLETE CBC AUTOMATED: CPT | Performed by: STUDENT IN AN ORGANIZED HEALTH CARE EDUCATION/TRAINING PROGRAM

## 2019-05-14 PROCEDURE — 25000132 ZZH RX MED GY IP 250 OP 250 PS 637

## 2019-05-14 PROCEDURE — 00000146 ZZHCL STATISTIC GLUCOSE BY METER IP

## 2019-05-14 PROCEDURE — 25000132 ZZH RX MED GY IP 250 OP 250 PS 637: Performed by: ANESTHESIOLOGY

## 2019-05-14 PROCEDURE — 99291 CRITICAL CARE FIRST HOUR: CPT | Mod: GC | Performed by: INTERNAL MEDICINE

## 2019-05-14 PROCEDURE — 83735 ASSAY OF MAGNESIUM: CPT

## 2019-05-14 PROCEDURE — 94645 CONT INHLJ TX EACH ADDL HOUR: CPT

## 2019-05-14 PROCEDURE — 25000128 H RX IP 250 OP 636

## 2019-05-14 PROCEDURE — 25000125 ZZHC RX 250: Performed by: STUDENT IN AN ORGANIZED HEALTH CARE EDUCATION/TRAINING PROGRAM

## 2019-05-14 PROCEDURE — 25800025 ZZH RX 258: Performed by: ANESTHESIOLOGY

## 2019-05-14 PROCEDURE — 40000275 ZZH STATISTIC RCP TIME EA 10 MIN

## 2019-05-14 PROCEDURE — 40000986 XR ABDOMEN PORT 1 VW

## 2019-05-14 PROCEDURE — 25000128 H RX IP 250 OP 636: Performed by: STUDENT IN AN ORGANIZED HEALTH CARE EDUCATION/TRAINING PROGRAM

## 2019-05-14 PROCEDURE — 84100 ASSAY OF PHOSPHORUS: CPT | Performed by: STUDENT IN AN ORGANIZED HEALTH CARE EDUCATION/TRAINING PROGRAM

## 2019-05-14 PROCEDURE — 80053 COMPREHEN METABOLIC PANEL: CPT | Performed by: STUDENT IN AN ORGANIZED HEALTH CARE EDUCATION/TRAINING PROGRAM

## 2019-05-14 PROCEDURE — 25800030 ZZH RX IP 258 OP 636: Performed by: ANESTHESIOLOGY

## 2019-05-14 PROCEDURE — 20000004 ZZH R&B ICU UMMC

## 2019-05-14 PROCEDURE — 85027 COMPLETE CBC AUTOMATED: CPT

## 2019-05-14 PROCEDURE — 84100 ASSAY OF PHOSPHORUS: CPT

## 2019-05-14 PROCEDURE — C9113 INJ PANTOPRAZOLE SODIUM, VIA: HCPCS

## 2019-05-14 PROCEDURE — 25800030 ZZH RX IP 258 OP 636: Performed by: STUDENT IN AN ORGANIZED HEALTH CARE EDUCATION/TRAINING PROGRAM

## 2019-05-14 PROCEDURE — 25000125 ZZHC RX 250

## 2019-05-14 PROCEDURE — 83735 ASSAY OF MAGNESIUM: CPT | Performed by: STUDENT IN AN ORGANIZED HEALTH CARE EDUCATION/TRAINING PROGRAM

## 2019-05-14 RX ORDER — FUROSEMIDE 10 MG/ML
60 INJECTION INTRAMUSCULAR; INTRAVENOUS ONCE
Status: COMPLETED | OUTPATIENT
Start: 2019-05-14 | End: 2019-05-14

## 2019-05-14 RX ORDER — FUROSEMIDE 10 MG/ML
40 INJECTION INTRAMUSCULAR; INTRAVENOUS ONCE
Status: COMPLETED | OUTPATIENT
Start: 2019-05-14 | End: 2019-05-14

## 2019-05-14 RX ORDER — FOLIC ACID 1 MG/1
1 TABLET ORAL DAILY
Status: DISCONTINUED | OUTPATIENT
Start: 2019-05-14 | End: 2019-05-14

## 2019-05-14 RX ORDER — SODIUM CHLORIDE 9 MG/ML
INJECTION, SOLUTION INTRAVENOUS CONTINUOUS
Status: DISCONTINUED | OUTPATIENT
Start: 2019-05-14 | End: 2019-05-24

## 2019-05-14 RX ORDER — POLYETHYLENE GLYCOL 3350 17 G/17G
17 POWDER, FOR SOLUTION ORAL ONCE
Status: COMPLETED | OUTPATIENT
Start: 2019-05-14 | End: 2019-05-14

## 2019-05-14 RX ORDER — THIAMINE HCL 50 MG
100 TABLET ORAL DAILY
Status: DISCONTINUED | OUTPATIENT
Start: 2019-05-14 | End: 2019-05-14

## 2019-05-14 RX ORDER — HEPARIN SODIUM 10000 [USP'U]/100ML
0-3500 INJECTION, SOLUTION INTRAVENOUS CONTINUOUS
Status: DISCONTINUED | OUTPATIENT
Start: 2019-05-14 | End: 2019-05-24

## 2019-05-14 RX ORDER — THIAMINE HYDROCHLORIDE 100 MG/ML
100 INJECTION, SOLUTION INTRAMUSCULAR; INTRAVENOUS DAILY
Status: DISCONTINUED | OUTPATIENT
Start: 2019-05-14 | End: 2019-05-16

## 2019-05-14 RX ADMIN — POTASSIUM CHLORIDE 20 MEQ: 29.8 INJECTION, SOLUTION INTRAVENOUS at 10:34

## 2019-05-14 RX ADMIN — PANTOPRAZOLE SODIUM 40 MG: 40 INJECTION, POWDER, LYOPHILIZED, FOR SOLUTION INTRAVENOUS at 08:21

## 2019-05-14 RX ADMIN — Medication 0.26 MCG/KG/MIN: at 11:10

## 2019-05-14 RX ADMIN — SODIUM CHLORIDE 9 UNITS/KG/HR: 9 INJECTION, SOLUTION INTRAVENOUS at 10:33

## 2019-05-14 RX ADMIN — DEXTROSE MONOHYDRATE 55 ML/HR: 70 INJECTION, SOLUTION INTRAVENOUS at 00:27

## 2019-05-14 RX ADMIN — FENTANYL CITRATE 100 MCG: 50 INJECTION, SOLUTION INTRAMUSCULAR; INTRAVENOUS at 11:38

## 2019-05-14 RX ADMIN — IPRATROPIUM BROMIDE AND ALBUTEROL SULFATE 3 ML: .5; 3 SOLUTION RESPIRATORY (INHALATION) at 20:50

## 2019-05-14 RX ADMIN — POTASSIUM CHLORIDE 20 MEQ: 29.8 INJECTION, SOLUTION INTRAVENOUS at 23:37

## 2019-05-14 RX ADMIN — Medication 0.22 MCG/KG/MIN: at 09:18

## 2019-05-14 RX ADMIN — FENTANYL CITRATE 100 MCG: 50 INJECTION, SOLUTION INTRAMUSCULAR; INTRAVENOUS at 19:22

## 2019-05-14 RX ADMIN — MIDAZOLAM 2 MG: 1 INJECTION INTRAMUSCULAR; INTRAVENOUS at 22:15

## 2019-05-14 RX ADMIN — Medication 100 MCG/HR: at 20:09

## 2019-05-14 RX ADMIN — IPRATROPIUM BROMIDE AND ALBUTEROL SULFATE 3 ML: .5; 3 SOLUTION RESPIRATORY (INHALATION) at 16:07

## 2019-05-14 RX ADMIN — FENTANYL CITRATE 100 MCG: 50 INJECTION, SOLUTION INTRAMUSCULAR; INTRAVENOUS at 20:25

## 2019-05-14 RX ADMIN — POTASSIUM PHOSPHATE, MONOBASIC AND POTASSIUM PHOSPHATE, DIBASIC 20 MMOL: 224; 236 INJECTION, SOLUTION INTRAVENOUS at 03:15

## 2019-05-14 RX ADMIN — FUROSEMIDE 40 MG: 10 INJECTION, SOLUTION INTRAVENOUS at 18:44

## 2019-05-14 RX ADMIN — DEXTROSE MONOHYDRATE 60 ML/HR: 70 INJECTION, SOLUTION INTRAVENOUS at 08:20

## 2019-05-14 RX ADMIN — SODIUM CHLORIDE 9 UNITS/KG/HR: 9 INJECTION, SOLUTION INTRAVENOUS at 12:45

## 2019-05-14 RX ADMIN — FENTANYL CITRATE 100 MCG: 50 INJECTION, SOLUTION INTRAMUSCULAR; INTRAVENOUS at 15:16

## 2019-05-14 RX ADMIN — LEVOTHYROXINE SODIUM 50 MCG: 25 TABLET ORAL at 08:21

## 2019-05-14 RX ADMIN — DEXTROSE MONOHYDRATE 60 ML/HR: 70 INJECTION, SOLUTION INTRAVENOUS at 17:50

## 2019-05-14 RX ADMIN — Medication 0.32 MCG/KG/MIN: at 13:52

## 2019-05-14 RX ADMIN — FUROSEMIDE 60 MG: 10 INJECTION, SOLUTION INTRAVENOUS at 01:13

## 2019-05-14 RX ADMIN — Medication 0.28 MCG/KG/MIN: at 11:47

## 2019-05-14 RX ADMIN — CEFTRIAXONE 1 G: 1 INJECTION, POWDER, FOR SOLUTION INTRAMUSCULAR; INTRAVENOUS at 16:46

## 2019-05-14 RX ADMIN — DEXTROSE MONOHYDRATE 60 ML/HR: 70 INJECTION, SOLUTION INTRAVENOUS at 10:06

## 2019-05-14 RX ADMIN — POTASSIUM CHLORIDE 20 MEQ: 29.8 INJECTION, SOLUTION INTRAVENOUS at 11:45

## 2019-05-14 RX ADMIN — EPOPROSTENOL SODIUM 20 NG/KG/MIN: 1500000 INJECTION, POWDER, LYOPHILIZED, FOR SOLUTION INTRAVENOUS at 03:19

## 2019-05-14 RX ADMIN — MIDAZOLAM 4 MG: 1 INJECTION INTRAMUSCULAR; INTRAVENOUS at 19:51

## 2019-05-14 RX ADMIN — FENTANYL CITRATE 100 MCG: 50 INJECTION, SOLUTION INTRAMUSCULAR; INTRAVENOUS at 16:47

## 2019-05-14 RX ADMIN — DEXTROSE MONOHYDRATE 25 ML: 25 INJECTION, SOLUTION INTRAVENOUS at 22:55

## 2019-05-14 RX ADMIN — SODIUM PHOSPHATE 1 ENEMA: 7; 19 ENEMA RECTAL at 20:10

## 2019-05-14 RX ADMIN — SODIUM CHLORIDE 9 UNITS/KG/HR: 9 INJECTION, SOLUTION INTRAVENOUS at 17:26

## 2019-05-14 RX ADMIN — Medication 0.2 MCG/KG/MIN: at 09:13

## 2019-05-14 RX ADMIN — VASOPRESSIN 2.4 UNITS/HR: 20 INJECTION INTRAVENOUS at 06:50

## 2019-05-14 RX ADMIN — HEPARIN SODIUM 900 UNITS/HR: 10000 INJECTION, SOLUTION INTRAVENOUS at 10:58

## 2019-05-14 RX ADMIN — SODIUM CHLORIDE 9 UNITS/KG/HR: 9 INJECTION, SOLUTION INTRAVENOUS at 06:05

## 2019-05-14 RX ADMIN — POTASSIUM CHLORIDE 20 MEQ: 29.8 INJECTION, SOLUTION INTRAVENOUS at 12:45

## 2019-05-14 RX ADMIN — POTASSIUM CHLORIDE 20 MEQ: 29.8 INJECTION, SOLUTION INTRAVENOUS at 21:15

## 2019-05-14 RX ADMIN — SODIUM CHLORIDE 9 UNITS/KG/HR: 9 INJECTION, SOLUTION INTRAVENOUS at 01:13

## 2019-05-14 RX ADMIN — SODIUM CHLORIDE 8 UNITS/KG/HR: 9 INJECTION, SOLUTION INTRAVENOUS at 20:02

## 2019-05-14 RX ADMIN — IPRATROPIUM BROMIDE AND ALBUTEROL SULFATE 3 ML: .5; 3 SOLUTION RESPIRATORY (INHALATION) at 07:46

## 2019-05-14 RX ADMIN — BISACODYL 10 MG: 10 SUPPOSITORY RECTAL at 08:22

## 2019-05-14 RX ADMIN — HEPARIN SODIUM 5000 UNITS: 5000 INJECTION, SOLUTION INTRAVENOUS; SUBCUTANEOUS at 05:34

## 2019-05-14 RX ADMIN — SODIUM CHLORIDE 9 UNITS/KG/HR: 9 INJECTION, SOLUTION INTRAVENOUS at 08:22

## 2019-05-14 RX ADMIN — SODIUM CHLORIDE 9 UNITS/KG/HR: 9 INJECTION, SOLUTION INTRAVENOUS at 15:17

## 2019-05-14 RX ADMIN — THIAMINE HYDROCHLORIDE 100 MG: 100 INJECTION, SOLUTION INTRAMUSCULAR; INTRAVENOUS at 14:22

## 2019-05-14 RX ADMIN — SODIUM CHLORIDE 9 UNITS/KG/HR: 9 INJECTION, SOLUTION INTRAVENOUS at 03:42

## 2019-05-14 RX ADMIN — POLYETHYLENE GLYCOL 3350 17 G: 17 POWDER, FOR SOLUTION ORAL at 10:57

## 2019-05-14 RX ADMIN — POTASSIUM CHLORIDE 20 MEQ: 29.8 INJECTION, SOLUTION INTRAVENOUS at 18:45

## 2019-05-14 RX ADMIN — EPOPROSTENOL SODIUM 20 NG/KG/MIN: 1500000 INJECTION, POWDER, LYOPHILIZED, FOR SOLUTION INTRAVENOUS at 10:38

## 2019-05-14 RX ADMIN — Medication 2 G: at 22:25

## 2019-05-14 RX ADMIN — Medication 0.32 MCG/KG/MIN: at 14:28

## 2019-05-14 RX ADMIN — CHLOROTHIAZIDE SODIUM 500 MG: 500 INJECTION, POWDER, LYOPHILIZED, FOR SOLUTION INTRAVENOUS at 01:20

## 2019-05-14 RX ADMIN — Medication 0.3 MCG/KG/MIN: at 13:47

## 2019-05-14 RX ADMIN — Medication 0.18 MCG/KG/MIN: at 09:01

## 2019-05-14 RX ADMIN — FUROSEMIDE 60 MG: 10 INJECTION, SOLUTION INTRAVENOUS at 08:21

## 2019-05-14 RX ADMIN — POTASSIUM CHLORIDE 20 MEQ: 29.8 INJECTION, SOLUTION INTRAVENOUS at 17:50

## 2019-05-14 RX ADMIN — SODIUM CHLORIDE 6 UNITS/KG/HR: 9 INJECTION, SOLUTION INTRAVENOUS at 23:05

## 2019-05-14 RX ADMIN — POTASSIUM PHOSPHATE, MONOBASIC AND POTASSIUM PHOSPHATE, DIBASIC 15 MMOL: 224; 236 INJECTION, SOLUTION INTRAVENOUS at 11:15

## 2019-05-14 RX ADMIN — IPRATROPIUM BROMIDE AND ALBUTEROL SULFATE 3 ML: .5; 3 SOLUTION RESPIRATORY (INHALATION) at 12:15

## 2019-05-14 RX ADMIN — POTASSIUM CHLORIDE 20 MEQ: 29.8 INJECTION, SOLUTION INTRAVENOUS at 03:41

## 2019-05-14 RX ADMIN — MIDAZOLAM 3 MG/HR: 5 INJECTION INTRAMUSCULAR; INTRAVENOUS at 21:07

## 2019-05-14 RX ADMIN — VASOPRESSIN 2.4 UNITS/HR: 20 INJECTION INTRAVENOUS at 22:26

## 2019-05-14 RX ADMIN — SENNOSIDES AND DOCUSATE SODIUM 2 TABLET: 8.6; 5 TABLET ORAL at 08:21

## 2019-05-14 RX ADMIN — POTASSIUM CHLORIDE 20 MEQ: 29.8 INJECTION, SOLUTION INTRAVENOUS at 02:28

## 2019-05-14 ASSESSMENT — MIFFLIN-ST. JEOR: SCORE: 1313.62

## 2019-05-14 ASSESSMENT — ACTIVITIES OF DAILY LIVING (ADL)
ADLS_ACUITY_SCORE: 19

## 2019-05-14 NOTE — PLAN OF CARE
D: Pt admitted following attempted suicide requiring mechanical ventilation and hemodynamic support.  I/A: Pt unresponsive at start of shift, sedated on Versed & Fentanyl. PERRL 6 mm, very sluggish pupils. No cough/gag. Clonus to lower extremities bilaterally without response to pain/stimulation. Versed weaned to off by noon. Did eventually withdraw to pain in intraosseous site m/b withdrawing with all extremities to flushing the line. MDs notified. Pupils changed to PERRL 4 mm, equally responsive but more reactive than in AM. Clonus not apparent this PM. Slight cough/gag noted during repositioning and suctioning but no other purposeful movement/withdrawing to pain. Did frown with cares. EEG continues. Neuro consulted, recommending MRI once more stable. Using Fentanyl gtt for pain/discomfort, otherwise not requiring sedation at this time. NSR 70s-80, requiring pressors to keep MAP >60.  Able to wean off remaining pressors but still requiring Vaso/Levophed (see gtt flowsheets for titration). Vent settings changed to CMV RR=28/Vt=320/PEEP=14, PaO2 improving since PEEP changed. Weaning FiO2 as tolerates (see ABG trends).  Bladder pressure monitoring ordered but NANCY d/t unavailability of equipment. NPO. No BM. OG to LIS with minimal output. PRN laxative and x1 PO Gastrografin given this evening for bowel motility with known ileus. Napoles in place with improving urine output since Diuril/Lasix challenge this AM (~2.5L output response), blue/green in color (r/t Methylene blue). K+ replaced per protocol. Still requiring high dose insulin with D50 gtt for blood glucose management. No acute skin issues. Ex- updated at bedside, SW consulted for advance care planning.  P: Continue to monitor and implement POC, notify provider of changes.       Problem: Mood Alteration Comorbidity  Goal: Mood Alteration Comorbidity  Description  Patient comorbidity will be monitored for signs and symptoms of Mood Alteration condition.   Problems will be absent, minimized or managed by discharge/transition of care.  Outcome: No Change      Problem: Hypertension Comorbidity  Goal: Blood Pressure in Desired Range  Outcome: No Change

## 2019-05-14 NOTE — PLAN OF CARE
Remains in ICU. Mental status waxed and waned throughout night. Followed commands with wiggling toes/ hands on midnight assessment. Pupils equal, sluggish reaction to light. Moves all extremities against gravity. Teeth appear to be loose from pt biting on ET tube. Endorses CMV 45% 14P with inhaled Veletri. Sinus rhythm with PVCs. Pressors titrated down-see flowsheet. Insulin/D50 gtt continue to maintain BP. Small BM overnight. Abd distended. OG to LIS with gold/brown output. Napoles in place with green/blue output. Adequate response to Diuril/Lasix overnight. Lytes replaced per protocol. Called Gabriel in AM with update-no answer. Continue POC. Wean as able.      Problem: Adult Inpatient Plan of Care  Goal: Plan of Care Review  5/14/2019 0606 by Jorgito Prasad, RN  Outcome: Improving     Problem: Hemodynamic Instability (Overdose)  Goal: Effective Tissue Perfusion  5/14/2019 0606 by Jorgito Prasad, RN  Outcome: Improving     Problem: Neurologic Impairment (Overdose)  Goal: Optimal Neurologic Function  5/14/2019 0606 by Jorgito Prasad, RN  Outcome: Improving

## 2019-05-14 NOTE — PROCEDURES
[facility] EEG #-2 (Day 2 of Video-EEG Monitoring)    DATE OF RECORDIN2019    DURATION OF RECORDIN hours, 2 minutes.      CLINICAL SUMMARY:  This video-EEG monitoring procedure is performed in diagnostic evaluation of encephalopathy in Ana Laura Wharton.  She was reported to be mechanically ventilated with continuous parenteral sedation by fentanyl, and additional episodic dosing of midazolam.      TECHNICAL SUMMARY:  This continuous EEG monitoring procedure was performed with 23 scalp electrodes in 10-20 system placements, and additional scalp, precordial and other surface electrodes used for electrical referencing and artifact detection.  Video monitoring was utilized and periodically reviewed by EEG technologists and the physician for electroclinical correlation.     EEG ACTIVITIES DURING COMA:  Initially on this day of monitoring, there was generalized 2-8 Hz delta-theta slowing, with very frequent obscuration of electrocerebral activities by high amplitude myogenic artifacts, at which times a variety of nonstereotyped abnormal movements were observed, including jaw clenching, jaw opening and closing with other facial movements, lateral and vertical eye movements, variable flexion and extension of arms and legs, and lower amplitude hand and foot movements at various times.  No electrographic seizures were observed during these prolonged periods of variable movements.      During the later portion of the recording, artifactual activities were markedly reduced and continuous moderate amplitude irregular 2-8 Hz delta-theta slowing predominated over lower amplitude faster frequencies.      No interictal epileptiform abnormalities and no electrographic seizures were observed on this day of monitoring.      SUMMARY OF DAY 2 OF VIDEO-EEG MONITORING:    The EEG recording during sedated coma was abnormal due to absence of wake-sleep cycling, with continuous moderate amplitude generalized delta-theta  slowing.    Prolonged periods of variable movements were not associated with recorded electrographic seizures, although myogenic artifacts obscured electrocerebral activities frequently during the initial portion of this day of monitoring.    No interictal epileptiform abnormalities and no electrographic seizures were recorded on this day of monitoring.    These findings indicate severe generalized cerebral dysfunction, which is etiologically nonspecific.  Clinical correlation is recommended.    David Grullon M.D., Professor of Neurology       D: 2019   T: 2019   MT: MARLENE      Name:     KEE TINEO   MRN:      [id number]        Account:        [id number]   :      1970           Procedure Date: 2019      Document: [id number]

## 2019-05-14 NOTE — PROGRESS NOTES
Brief Neuro-ICU Progress Note:    Patient re-evaluated today. She has been off versed gtt since 1200 on 5/13. Her neurological exam has improved. She opens her eyes spontaneously on intermittent occassions. She was able to follow commands (squeezes fingers) to nursing staff but not to me during my exam. Pupils are equal and reactive (2mm bilaterally) and corneals are are intact. She is moving all of her extremities spontaneously.  Her exam is re-assuring at this time. We will continuing following her for serial neurological exams.     Recommendations:  - Continue vEEG monitoring for one more day as we will follow changes in encephalopathy, if no evidence of electrographic seizures or epileptiform discharges, discontinue on 5/15  - Will follow up MRI/MRA results    Please do not hesitate to contact us should any further questions arise.     Yanet Pruitt  Neurology Resident, PGY2  Pager: [phone]

## 2019-05-14 NOTE — PROGRESS NOTES
CLINICAL NUTRITION SERVICES - ASSESSMENT NOTE     Nutrition Prescription    RECOMMENDATIONS FOR MDs/PROVIDERS TO ORDER:  Recheck Amylase with Lipase recheck.    Consider small bore FT placement if pt indeed has pancreatitis. Would consider small bore nasoenteral FT placement even if pt does not have pancreatitis as pt may not eat properly once awake and extubated, will likely have ongoing needs for enteral access for PO meds as well.     Malnutrition Status:    Severe malnutrition in the context of chronic illness.     Recommendations already ordered by Registered Dietitian (RD):  Discussed nutrition POC on MICU rounds, possible pancreatitis, likelihood of starting nutrition therapy. MD discussed rechecking lipase.    Discussed thiamin and folate with PharmD, rec changing to IV form.    Future/Additional Recommendations:  Once ok to start TF, would recommend semi-elemental, fiber-free TF regimen given ? Pancreatitis and ileus. Recommend Peptamen 1.5 @ 15 mL/hr and adv by 10 mL q8h until @ goal rate of 45 ml/hr. TF at goal volume would provide 1620 kcals (33 kcal/kg/day), 73 g PRO (1.5 g/kg/day), 832 mL H2O, 60 g Fat (70% from MCTs), 203 g CHO and no Fiber daily.    With TF start, would order daily Certavite multivit/min to provide micronutrients while critically ill.     If unable to use pt's GI tract for nutrition within the next 1-2 days, would initiate PN and lipids for nutrition therapy. Recommend 840-890 mL/day (or lowest volume PharmD can use to fit macro- and micronutrients in) of 150 g dextrose (goal would be 200 g per day), 95 g AA (2 g PRO/kg) and IV lipids Mon-Fri. Dosing wt 49 kg. PN regimen with goal dextrose provision would provide 1417 kcal (29 kcal/kg) daily with a GIR of 2.8 mg/kg/min.       REASON FOR ASSESSMENT  Ana Laura Wharton is a 48 year old female assessed by the dietitian for Interdisciplinary Rounds and NPO x 3 days on vent.    NUTRITION HISTORY  Unable to interview pt d/t intubation and  "sedation. No family at bedside. Per RN, pt's ex- reported pt being thin the last time he saw her (a few months ago) and that, in the past, when pt ate she would be in the bathroom 15 min later as \"things would run right through her.\" Pt does have a h/o C.diff, unsure if this was related to his comment or not.     CURRENT NUTRITION ORDERS  Diet: NPO  Intake/Tolerance: NANCY    LABS  Labs reviewed- phos low at 2.4 and K+ low at 2.9 today (5/14). Pt is at high risk for refeeding syndrome. Mg++ was low on 5/11 at 1.5 as well.   Lipase was high at 1100 on 5/11/19. Amylase not checked.     MEDICATIONS  Medications reviewed- thiamin and folate had been ordered but cancelled same day (5/14) as pt has an OGT but may have an ileus.     ANTHROPOMETRICS  Height: 165.7 cm (5' 5.236\")  Most Recent Weight: 67.9 kg (149 lb 11.1 oz)    IBW: 56.8 kg  BMI: 18.1 kg/m^2 -  Underweight BMI <18.5 (when using dry wt)  Weight History:   Wt Readings from Last 10 Encounters:   05/14/19 67.9 kg (149 lb 11.1 oz)   05/11/19 49.4 kg (109 lb)   01/23/19 50.4 kg (111 lb 3.2 oz)   01/09/19 50.8 kg (112 lb)   10/31/18 52.3 kg (115 lb 4.8 oz)   10/10/18 51.7 kg (114 lb)   09/17/18 53.9 kg (118 lb 14.4 oz)   09/07/18 52.2 kg (115 lb)   08/03/18 55.4 kg (122 lb 3.2 oz)   02/13/18 52.2 kg (115 lb)       Dosing Weight: 49 kg (dry wt from 5/11/19)    ASSESSED NUTRITION NEEDS  Estimated Energy Needs: 0944-3218+ kcals/day (25 - 30+ kcals/kg)  Justification: Maintenance and Underweight  Estimated Protein Needs:  g/day (1.5 - 2 g/kg)  Justification: Hypercatabolism with critical illness  Estimated Fluid Needs: Per provider pending fluid status    PHYSICAL FINDINGS  See malnutrition section below.  Poor skin turgor   Multiple areas of scabs on legs    MALNUTRITION  % Intake: Unable to assess, but given mental health status, likely not great  % Weight Loss: Pt likely has had weight loss of significant amount, but weights vary per weight " hx.  Subcutaneous Fat Loss: None observed  Muscle Loss: Scapular bone: moderate, Thoracic region (clavicle, acromium bone, deltoid, trapezius, pectoral): moderate, Upper arm (bicep, tricep): moderate (right > left), Lower arm (forearm): moderate, Dorsal hand: mild, Upper leg (quadricep, hamstring): moderate, Patellar region: moderate and Posterior calf: severe  Fluid Accumulation/Edema: Moderate- anasarca  Malnutrition Diagnosis: Severe malnutrition in the context of chronic illness.     NUTRITION DIAGNOSIS  Inadequate protein-energy intake related to depression, now with resp and GI status inhibiting ability to take PO as evidenced by pt NPO x 3 days, muscle loss and anasarca c/w severe malnutrition.      INTERVENTIONS  Implementation  Nutrition Education: Not appropriate at this time due to patient condition   Collaboration with other providers - MICU rounds, discussed holding TF d/t ileus. Pt may have pancreatitis.     Goals  Total avg nutritional intake to meet a minimum of 25 kcal/kg and 1.5 g PRO/kg daily (per dosing wt 49 kg).     Monitoring/Evaluation  Progress toward goals will be monitored and evaluated per protocol.    Ghislaine Montes RD, LD  (MICU dietitian, pgr- [phone])

## 2019-05-15 ENCOUNTER — APPOINTMENT (OUTPATIENT)
Dept: ULTRASOUND IMAGING | Facility: CLINIC | Age: 49
DRG: 987 | End: 2019-05-15
Attending: STUDENT IN AN ORGANIZED HEALTH CARE EDUCATION/TRAINING PROGRAM
Payer: COMMERCIAL

## 2019-05-15 ENCOUNTER — ALLIED HEALTH/NURSE VISIT (OUTPATIENT)
Dept: NEUROLOGY | Facility: CLINIC | Age: 49
DRG: 987 | End: 2019-05-15
Attending: PSYCHIATRY & NEUROLOGY
Payer: COMMERCIAL

## 2019-05-15 ENCOUNTER — APPOINTMENT (OUTPATIENT)
Dept: GENERAL RADIOLOGY | Facility: CLINIC | Age: 49
DRG: 987 | End: 2019-05-15
Attending: ANESTHESIOLOGY
Payer: COMMERCIAL

## 2019-05-15 DIAGNOSIS — I67.83 PRES (POSTERIOR REVERSIBLE ENCEPHALOPATHY SYNDROME): Primary | ICD-10-CM

## 2019-05-15 LAB
ALBUMIN SERPL-MCNC: 1.8 G/DL (ref 3.4–5)
ALBUMIN SERPL-MCNC: 1.8 G/DL (ref 3.4–5)
ALBUMIN SERPL-MCNC: 2 G/DL (ref 3.4–5)
ALBUMIN SERPL-MCNC: 2 G/DL (ref 3.4–5)
ALBUMIN UR-MCNC: 30 MG/DL
ALP SERPL-CCNC: 107 U/L (ref 40–150)
ALP SERPL-CCNC: 114 U/L (ref 40–150)
ALP SERPL-CCNC: 89 U/L (ref 40–150)
ALP SERPL-CCNC: 89 U/L (ref 40–150)
ALT SERPL W P-5'-P-CCNC: 28 U/L (ref 0–50)
ALT SERPL W P-5'-P-CCNC: 29 U/L (ref 0–50)
ALT SERPL W P-5'-P-CCNC: 32 U/L (ref 0–50)
ALT SERPL W P-5'-P-CCNC: 40 U/L (ref 0–50)
ANION GAP SERPL CALCULATED.3IONS-SCNC: 3 MMOL/L (ref 3–14)
ANION GAP SERPL CALCULATED.3IONS-SCNC: 4 MMOL/L (ref 3–14)
ANION GAP SERPL CALCULATED.3IONS-SCNC: 4 MMOL/L (ref 3–14)
ANION GAP SERPL CALCULATED.3IONS-SCNC: <1 MMOL/L (ref 3–14)
APPEARANCE UR: CLEAR
AST SERPL W P-5'-P-CCNC: 108 U/L (ref 0–45)
AST SERPL W P-5'-P-CCNC: 81 U/L (ref 0–45)
AST SERPL W P-5'-P-CCNC: 82 U/L (ref 0–45)
AST SERPL W P-5'-P-CCNC: 90 U/L (ref 0–45)
BASE EXCESS BLDA CALC-SCNC: 10.6 MMOL/L
BASE EXCESS BLDA CALC-SCNC: 13.6 MMOL/L
BASE EXCESS BLDA CALC-SCNC: 15.1 MMOL/L
BASE EXCESS BLDA CALC-SCNC: 16.4 MMOL/L
BASE EXCESS BLDA CALC-SCNC: 9.7 MMOL/L
BILIRUB SERPL-MCNC: 1 MG/DL (ref 0.2–1.3)
BILIRUB SERPL-MCNC: 1.4 MG/DL (ref 0.2–1.3)
BILIRUB SERPL-MCNC: 1.9 MG/DL (ref 0.2–1.3)
BILIRUB SERPL-MCNC: 2.3 MG/DL (ref 0.2–1.3)
BILIRUB UR QL STRIP: ABNORMAL
BUN SERPL-MCNC: 10 MG/DL (ref 7–30)
BUN SERPL-MCNC: 11 MG/DL (ref 7–30)
BUN SERPL-MCNC: 9 MG/DL (ref 7–30)
BUN SERPL-MCNC: 9 MG/DL (ref 7–30)
CALCIUM SERPL-MCNC: 6.8 MG/DL (ref 8.5–10.1)
CALCIUM SERPL-MCNC: 7 MG/DL (ref 8.5–10.1)
CALCIUM SERPL-MCNC: 7.1 MG/DL (ref 8.5–10.1)
CALCIUM SERPL-MCNC: 7.2 MG/DL (ref 8.5–10.1)
CHLORIDE SERPL-SCNC: 95 MMOL/L (ref 94–109)
CHLORIDE SERPL-SCNC: 95 MMOL/L (ref 94–109)
CHLORIDE SERPL-SCNC: 96 MMOL/L (ref 94–109)
CHLORIDE SERPL-SCNC: 96 MMOL/L (ref 94–109)
CO2 SERPL-SCNC: 37 MMOL/L (ref 20–32)
CO2 SERPL-SCNC: 38 MMOL/L (ref 20–32)
CO2 SERPL-SCNC: 38 MMOL/L (ref 20–32)
CO2 SERPL-SCNC: 39 MMOL/L (ref 20–32)
COLOR UR AUTO: ABNORMAL
CREAT SERPL-MCNC: 0.58 MG/DL (ref 0.52–1.04)
CREAT SERPL-MCNC: 0.64 MG/DL (ref 0.52–1.04)
CREAT SERPL-MCNC: 0.66 MG/DL (ref 0.52–1.04)
CREAT SERPL-MCNC: 0.69 MG/DL (ref 0.52–1.04)
ERYTHROCYTE [DISTWIDTH] IN BLOOD BY AUTOMATED COUNT: 14.8 % (ref 10–15)
ERYTHROCYTE [DISTWIDTH] IN BLOOD BY AUTOMATED COUNT: 15 % (ref 10–15)
ERYTHROCYTE [DISTWIDTH] IN BLOOD BY AUTOMATED COUNT: 15 % (ref 10–15)
GFR SERPL CREATININE-BSD FRML MDRD: >90 ML/MIN/{1.73_M2}
GLUCOSE BLDC GLUCOMTR-MCNC: 102 MG/DL (ref 70–99)
GLUCOSE BLDC GLUCOMTR-MCNC: 112 MG/DL (ref 70–99)
GLUCOSE BLDC GLUCOMTR-MCNC: 116 MG/DL (ref 70–99)
GLUCOSE BLDC GLUCOMTR-MCNC: 147 MG/DL (ref 70–99)
GLUCOSE BLDC GLUCOMTR-MCNC: 160 MG/DL (ref 70–99)
GLUCOSE BLDC GLUCOMTR-MCNC: 161 MG/DL (ref 70–99)
GLUCOSE BLDC GLUCOMTR-MCNC: 177 MG/DL (ref 70–99)
GLUCOSE BLDC GLUCOMTR-MCNC: 57 MG/DL (ref 70–99)
GLUCOSE BLDC GLUCOMTR-MCNC: 62 MG/DL (ref 70–99)
GLUCOSE BLDC GLUCOMTR-MCNC: 65 MG/DL (ref 70–99)
GLUCOSE BLDC GLUCOMTR-MCNC: 68 MG/DL (ref 70–99)
GLUCOSE BLDC GLUCOMTR-MCNC: 71 MG/DL (ref 70–99)
GLUCOSE BLDC GLUCOMTR-MCNC: 72 MG/DL (ref 70–99)
GLUCOSE BLDC GLUCOMTR-MCNC: 73 MG/DL (ref 70–99)
GLUCOSE BLDC GLUCOMTR-MCNC: 73 MG/DL (ref 70–99)
GLUCOSE BLDC GLUCOMTR-MCNC: 77 MG/DL (ref 70–99)
GLUCOSE BLDC GLUCOMTR-MCNC: 77 MG/DL (ref 70–99)
GLUCOSE BLDC GLUCOMTR-MCNC: 78 MG/DL (ref 70–99)
GLUCOSE BLDC GLUCOMTR-MCNC: 79 MG/DL (ref 70–99)
GLUCOSE BLDC GLUCOMTR-MCNC: 80 MG/DL (ref 70–99)
GLUCOSE BLDC GLUCOMTR-MCNC: 82 MG/DL (ref 70–99)
GLUCOSE BLDC GLUCOMTR-MCNC: 83 MG/DL (ref 70–99)
GLUCOSE BLDC GLUCOMTR-MCNC: 85 MG/DL (ref 70–99)
GLUCOSE BLDC GLUCOMTR-MCNC: 87 MG/DL (ref 70–99)
GLUCOSE BLDC GLUCOMTR-MCNC: 89 MG/DL (ref 70–99)
GLUCOSE BLDC GLUCOMTR-MCNC: 91 MG/DL (ref 70–99)
GLUCOSE BLDC GLUCOMTR-MCNC: 95 MG/DL (ref 70–99)
GLUCOSE BLDC GLUCOMTR-MCNC: 96 MG/DL (ref 70–99)
GLUCOSE BLDC GLUCOMTR-MCNC: 98 MG/DL (ref 70–99)
GLUCOSE SERPL-MCNC: 72 MG/DL (ref 70–99)
GLUCOSE SERPL-MCNC: 77 MG/DL (ref 70–99)
GLUCOSE SERPL-MCNC: 82 MG/DL (ref 70–99)
GLUCOSE SERPL-MCNC: 94 MG/DL (ref 70–99)
GLUCOSE UR STRIP-MCNC: NEGATIVE MG/DL
GRAM STN SPEC: NORMAL
GRAM STN SPEC: NORMAL
HCO3 BLD-SCNC: 37 MMOL/L (ref 21–28)
HCO3 BLD-SCNC: 37 MMOL/L (ref 21–28)
HCO3 BLD-SCNC: 40 MMOL/L (ref 21–28)
HCO3 BLD-SCNC: 41 MMOL/L (ref 21–28)
HCO3 BLD-SCNC: 42 MMOL/L (ref 21–28)
HCT VFR BLD AUTO: 20.6 % (ref 35–47)
HCT VFR BLD AUTO: 22.7 % (ref 35–47)
HCT VFR BLD AUTO: 23.3 % (ref 35–47)
HGB BLD-MCNC: 6.5 G/DL (ref 11.7–15.7)
HGB BLD-MCNC: 7.2 G/DL (ref 11.7–15.7)
HGB BLD-MCNC: 7.5 G/DL (ref 11.7–15.7)
HGB UR QL STRIP: ABNORMAL
KETONES UR STRIP-MCNC: NEGATIVE MG/DL
LEUKOCYTE ESTERASE UR QL STRIP: NEGATIVE
LMWH PPP CHRO-ACNC: 0.18 IU/ML
LMWH PPP CHRO-ACNC: 0.29 IU/ML
LMWH PPP CHRO-ACNC: 0.37 IU/ML
LMWH PPP CHRO-ACNC: NORMAL IU/ML
MAGNESIUM SERPL-MCNC: 1.9 MG/DL (ref 1.6–2.3)
MAGNESIUM SERPL-MCNC: 2 MG/DL (ref 1.6–2.3)
MAGNESIUM SERPL-MCNC: 2.2 MG/DL (ref 1.6–2.3)
MCH RBC QN AUTO: 29 PG (ref 26.5–33)
MCH RBC QN AUTO: 29.1 PG (ref 26.5–33)
MCH RBC QN AUTO: 29.5 PG (ref 26.5–33)
MCHC RBC AUTO-ENTMCNC: 31.6 G/DL (ref 31.5–36.5)
MCHC RBC AUTO-ENTMCNC: 31.7 G/DL (ref 31.5–36.5)
MCHC RBC AUTO-ENTMCNC: 32.2 G/DL (ref 31.5–36.5)
MCV RBC AUTO: 92 FL (ref 78–100)
MUCOUS THREADS #/AREA URNS LPF: PRESENT /LPF
NITRATE UR QL: NEGATIVE
O2/TOTAL GAS SETTING VFR VENT: 45 %
O2/TOTAL GAS SETTING VFR VENT: 45 %
O2/TOTAL GAS SETTING VFR VENT: 60 %
O2/TOTAL GAS SETTING VFR VENT: 65 %
O2/TOTAL GAS SETTING VFR VENT: 85 %
PCO2 BLD: 50 MM HG (ref 35–45)
PCO2 BLD: 63 MM HG (ref 35–45)
PCO2 BLD: 64 MM HG (ref 35–45)
PCO2 BLD: 66 MM HG (ref 35–45)
PCO2 BLD: 71 MM HG (ref 35–45)
PH BLD: 7.33 PH (ref 7.35–7.45)
PH BLD: 7.38 PH (ref 7.35–7.45)
PH BLD: 7.41 PH (ref 7.35–7.45)
PH BLD: 7.41 PH (ref 7.35–7.45)
PH BLD: 7.52 PH (ref 7.35–7.45)
PH UR STRIP: 7.5 PH (ref 5–7)
PHOSPHATE SERPL-MCNC: 3.2 MG/DL (ref 2.5–4.5)
PLATELET # BLD AUTO: 114 10E9/L (ref 150–450)
PLATELET # BLD AUTO: 119 10E9/L (ref 150–450)
PLATELET # BLD AUTO: 124 10E9/L (ref 150–450)
PO2 BLD: 114 MM HG (ref 80–105)
PO2 BLD: 144 MM HG (ref 80–105)
PO2 BLD: 214 MM HG (ref 80–105)
PO2 BLD: 45 MM HG (ref 80–105)
PO2 BLD: 75 MM HG (ref 80–105)
POTASSIUM SERPL-SCNC: 3 MMOL/L (ref 3.4–5.3)
POTASSIUM SERPL-SCNC: 3 MMOL/L (ref 3.4–5.3)
POTASSIUM SERPL-SCNC: 3.1 MMOL/L (ref 3.4–5.3)
POTASSIUM SERPL-SCNC: 3.4 MMOL/L (ref 3.4–5.3)
PROCALCITONIN SERPL-MCNC: 0.95 NG/ML
PROT SERPL-MCNC: 4.1 G/DL (ref 6.8–8.8)
PROT SERPL-MCNC: 4.2 G/DL (ref 6.8–8.8)
PROT SERPL-MCNC: 4.4 G/DL (ref 6.8–8.8)
PROT SERPL-MCNC: 4.5 G/DL (ref 6.8–8.8)
RBC # BLD AUTO: 2.23 10E12/L (ref 3.8–5.2)
RBC # BLD AUTO: 2.48 10E12/L (ref 3.8–5.2)
RBC # BLD AUTO: 2.54 10E12/L (ref 3.8–5.2)
RBC #/AREA URNS AUTO: >182 /HPF (ref 0–2)
SODIUM SERPL-SCNC: 134 MMOL/L (ref 133–144)
SODIUM SERPL-SCNC: 136 MMOL/L (ref 133–144)
SODIUM SERPL-SCNC: 136 MMOL/L (ref 133–144)
SODIUM SERPL-SCNC: 138 MMOL/L (ref 133–144)
SOURCE: ABNORMAL
SP GR UR STRIP: 1.02 (ref 1–1.03)
SPECIMEN SOURCE: NORMAL
SQUAMOUS #/AREA URNS AUTO: 1 /HPF (ref 0–1)
TRANS CELLS #/AREA URNS HPF: <1 /HPF (ref 0–1)
UROBILINOGEN UR STRIP-MCNC: 4 MG/DL (ref 0–2)
WBC # BLD AUTO: 13.4 10E9/L (ref 4–11)
WBC # BLD AUTO: 14.1 10E9/L (ref 4–11)
WBC # BLD AUTO: 16.6 10E9/L (ref 4–11)
WBC #/AREA URNS AUTO: 4 /HPF (ref 0–5)

## 2019-05-15 PROCEDURE — 99291 CRITICAL CARE FIRST HOUR: CPT | Mod: GC | Performed by: INTERNAL MEDICINE

## 2019-05-15 PROCEDURE — 84145 PROCALCITONIN (PCT): CPT

## 2019-05-15 PROCEDURE — 85027 COMPLETE CBC AUTOMATED: CPT | Performed by: STUDENT IN AN ORGANIZED HEALTH CARE EDUCATION/TRAINING PROGRAM

## 2019-05-15 PROCEDURE — 81001 URINALYSIS AUTO W/SCOPE: CPT | Performed by: STUDENT IN AN ORGANIZED HEALTH CARE EDUCATION/TRAINING PROGRAM

## 2019-05-15 PROCEDURE — 25000128 H RX IP 250 OP 636: Performed by: ANESTHESIOLOGY

## 2019-05-15 PROCEDURE — 86901 BLOOD TYPING SEROLOGIC RH(D): CPT | Performed by: ANESTHESIOLOGY

## 2019-05-15 PROCEDURE — 36415 COLL VENOUS BLD VENIPUNCTURE: CPT | Performed by: ANESTHESIOLOGY

## 2019-05-15 PROCEDURE — 93975 VASCULAR STUDY: CPT | Mod: TC

## 2019-05-15 PROCEDURE — 25000132 ZZH RX MED GY IP 250 OP 250 PS 637

## 2019-05-15 PROCEDURE — 94003 VENT MGMT INPAT SUBQ DAY: CPT

## 2019-05-15 PROCEDURE — 25800025 ZZH RX 258: Performed by: ANESTHESIOLOGY

## 2019-05-15 PROCEDURE — 86923 COMPATIBILITY TEST ELECTRIC: CPT | Performed by: ANESTHESIOLOGY

## 2019-05-15 PROCEDURE — 85520 HEPARIN ASSAY: CPT | Performed by: STUDENT IN AN ORGANIZED HEALTH CARE EDUCATION/TRAINING PROGRAM

## 2019-05-15 PROCEDURE — 95951 ZZHC EEG VIDEO EACH 24 HR: CPT | Mod: ZF

## 2019-05-15 PROCEDURE — 83735 ASSAY OF MAGNESIUM: CPT | Performed by: STUDENT IN AN ORGANIZED HEALTH CARE EDUCATION/TRAINING PROGRAM

## 2019-05-15 PROCEDURE — 00000146 ZZHCL STATISTIC GLUCOSE BY METER IP

## 2019-05-15 PROCEDURE — 80053 COMPREHEN METABOLIC PANEL: CPT | Performed by: STUDENT IN AN ORGANIZED HEALTH CARE EDUCATION/TRAINING PROGRAM

## 2019-05-15 PROCEDURE — 25000132 ZZH RX MED GY IP 250 OP 250 PS 637: Performed by: STUDENT IN AN ORGANIZED HEALTH CARE EDUCATION/TRAINING PROGRAM

## 2019-05-15 PROCEDURE — 84100 ASSAY OF PHOSPHORUS: CPT

## 2019-05-15 PROCEDURE — 87040 BLOOD CULTURE FOR BACTERIA: CPT | Performed by: ANESTHESIOLOGY

## 2019-05-15 PROCEDURE — 25000125 ZZHC RX 250

## 2019-05-15 PROCEDURE — 82330 ASSAY OF CALCIUM: CPT | Performed by: STUDENT IN AN ORGANIZED HEALTH CARE EDUCATION/TRAINING PROGRAM

## 2019-05-15 PROCEDURE — 94645 CONT INHLJ TX EACH ADDL HOUR: CPT

## 2019-05-15 PROCEDURE — 85520 HEPARIN ASSAY: CPT

## 2019-05-15 PROCEDURE — 87040 BLOOD CULTURE FOR BACTERIA: CPT | Performed by: STUDENT IN AN ORGANIZED HEALTH CARE EDUCATION/TRAINING PROGRAM

## 2019-05-15 PROCEDURE — 25000125 ZZHC RX 250: Performed by: STUDENT IN AN ORGANIZED HEALTH CARE EDUCATION/TRAINING PROGRAM

## 2019-05-15 PROCEDURE — 83735 ASSAY OF MAGNESIUM: CPT

## 2019-05-15 PROCEDURE — 25000128 H RX IP 250 OP 636: Performed by: STUDENT IN AN ORGANIZED HEALTH CARE EDUCATION/TRAINING PROGRAM

## 2019-05-15 PROCEDURE — 82803 BLOOD GASES ANY COMBINATION: CPT | Performed by: STUDENT IN AN ORGANIZED HEALTH CARE EDUCATION/TRAINING PROGRAM

## 2019-05-15 PROCEDURE — 86850 RBC ANTIBODY SCREEN: CPT | Performed by: ANESTHESIOLOGY

## 2019-05-15 PROCEDURE — P9041 ALBUMIN (HUMAN),5%, 50ML: HCPCS | Performed by: STUDENT IN AN ORGANIZED HEALTH CARE EDUCATION/TRAINING PROGRAM

## 2019-05-15 PROCEDURE — 82803 BLOOD GASES ANY COMBINATION: CPT

## 2019-05-15 PROCEDURE — 94644 CONT INHLJ TX 1ST HOUR: CPT

## 2019-05-15 PROCEDURE — 87070 CULTURE OTHR SPECIMN AEROBIC: CPT | Performed by: STUDENT IN AN ORGANIZED HEALTH CARE EDUCATION/TRAINING PROGRAM

## 2019-05-15 PROCEDURE — 25000128 H RX IP 250 OP 636

## 2019-05-15 PROCEDURE — 85027 COMPLETE CBC AUTOMATED: CPT

## 2019-05-15 PROCEDURE — 86900 BLOOD TYPING SEROLOGIC ABO: CPT | Performed by: ANESTHESIOLOGY

## 2019-05-15 PROCEDURE — 87205 SMEAR GRAM STAIN: CPT | Performed by: STUDENT IN AN ORGANIZED HEALTH CARE EDUCATION/TRAINING PROGRAM

## 2019-05-15 PROCEDURE — 71045 X-RAY EXAM CHEST 1 VIEW: CPT

## 2019-05-15 PROCEDURE — 25800030 ZZH RX IP 258 OP 636: Performed by: ANESTHESIOLOGY

## 2019-05-15 PROCEDURE — 80053 COMPREHEN METABOLIC PANEL: CPT

## 2019-05-15 PROCEDURE — 25000125 ZZHC RX 250: Performed by: ANESTHESIOLOGY

## 2019-05-15 PROCEDURE — 20000004 ZZH R&B ICU UMMC

## 2019-05-15 PROCEDURE — 25800030 ZZH RX IP 258 OP 636: Performed by: STUDENT IN AN ORGANIZED HEALTH CARE EDUCATION/TRAINING PROGRAM

## 2019-05-15 PROCEDURE — 40000275 ZZH STATISTIC RCP TIME EA 10 MIN

## 2019-05-15 RX ORDER — DEXTROSE 50 G/100ML
INJECTION, SOLUTION INTRAVENOUS CONTINUOUS
Status: DISCONTINUED | OUTPATIENT
Start: 2019-05-15 | End: 2019-05-16 | Stop reason: CLARIF

## 2019-05-15 RX ORDER — FOLIC ACID 5 MG/ML
1 INJECTION, SOLUTION INTRAMUSCULAR; INTRAVENOUS; SUBCUTANEOUS DAILY
Status: DISCONTINUED | OUTPATIENT
Start: 2019-05-15 | End: 2019-05-16

## 2019-05-15 RX ORDER — POLYETHYLENE GLYCOL 3350 17 G/17G
17 POWDER, FOR SOLUTION ORAL 3 TIMES DAILY
Status: DISCONTINUED | OUTPATIENT
Start: 2019-05-15 | End: 2019-05-16

## 2019-05-15 RX ORDER — CEFTRIAXONE 1 G/1
1 INJECTION, POWDER, FOR SOLUTION INTRAMUSCULAR; INTRAVENOUS EVERY 24 HOURS
Status: COMPLETED | OUTPATIENT
Start: 2019-05-15 | End: 2019-05-17

## 2019-05-15 RX ORDER — FUROSEMIDE 10 MG/ML
40 INJECTION INTRAMUSCULAR; INTRAVENOUS ONCE
Status: COMPLETED | OUTPATIENT
Start: 2019-05-15 | End: 2019-05-15

## 2019-05-15 RX ORDER — POLYETHYLENE GLYCOL 3350 17 G/17G
17 POWDER, FOR SOLUTION ORAL 3 TIMES DAILY
Status: DISCONTINUED | OUTPATIENT
Start: 2019-05-15 | End: 2019-05-15

## 2019-05-15 RX ORDER — MAGNESIUM CARB/ALUMINUM HYDROX 105-160MG
296 TABLET,CHEWABLE ORAL ONCE
Status: COMPLETED | OUTPATIENT
Start: 2019-05-15 | End: 2019-05-15

## 2019-05-15 RX ORDER — ALBUMIN, HUMAN INJ 5% 5 %
25 SOLUTION INTRAVENOUS ONCE
Status: COMPLETED | OUTPATIENT
Start: 2019-05-15 | End: 2019-05-15

## 2019-05-15 RX ADMIN — HEPARIN SODIUM 1050 UNITS/HR: 10000 INJECTION, SOLUTION INTRAVENOUS at 06:27

## 2019-05-15 RX ADMIN — DEXTROSE MONOHYDRATE 50 ML: 25 INJECTION, SOLUTION INTRAVENOUS at 05:06

## 2019-05-15 RX ADMIN — DEXTROSE MONOHYDRATE 50 ML: 25 INJECTION, SOLUTION INTRAVENOUS at 16:11

## 2019-05-15 RX ADMIN — MIDAZOLAM 4 MG: 1 INJECTION INTRAMUSCULAR; INTRAVENOUS at 06:28

## 2019-05-15 RX ADMIN — POTASSIUM CHLORIDE 20 MEQ: 29.8 INJECTION, SOLUTION INTRAVENOUS at 12:00

## 2019-05-15 RX ADMIN — DEXTROSE MONOHYDRATE 50 ML: 25 INJECTION, SOLUTION INTRAVENOUS at 12:35

## 2019-05-15 RX ADMIN — Medication 2 G: at 05:13

## 2019-05-15 RX ADMIN — MIDAZOLAM 2 MG: 1 INJECTION INTRAMUSCULAR; INTRAVENOUS at 09:52

## 2019-05-15 RX ADMIN — POTASSIUM CHLORIDE 20 MEQ: 29.8 INJECTION, SOLUTION INTRAVENOUS at 03:09

## 2019-05-15 RX ADMIN — IPRATROPIUM BROMIDE AND ALBUTEROL SULFATE 3 ML: .5; 3 SOLUTION RESPIRATORY (INHALATION) at 13:03

## 2019-05-15 RX ADMIN — POTASSIUM CHLORIDE 20 MEQ: 29.8 INJECTION, SOLUTION INTRAVENOUS at 04:05

## 2019-05-15 RX ADMIN — CEFTRIAXONE 1 G: 1 INJECTION, POWDER, FOR SOLUTION INTRAMUSCULAR; INTRAVENOUS at 16:40

## 2019-05-15 RX ADMIN — IPRATROPIUM BROMIDE AND ALBUTEROL SULFATE 3 ML: .5; 3 SOLUTION RESPIRATORY (INHALATION) at 19:28

## 2019-05-15 RX ADMIN — POLYETHYLENE GLYCOL 3350 17 G: 17 POWDER, FOR SOLUTION ORAL at 19:34

## 2019-05-15 RX ADMIN — POTASSIUM CHLORIDE 20 MEQ: 1.5 POWDER, FOR SOLUTION ORAL at 18:22

## 2019-05-15 RX ADMIN — IPRATROPIUM BROMIDE AND ALBUTEROL SULFATE 3 ML: .5; 3 SOLUTION RESPIRATORY (INHALATION) at 15:49

## 2019-05-15 RX ADMIN — POTASSIUM CHLORIDE 20 MEQ: 29.8 INJECTION, SOLUTION INTRAVENOUS at 10:58

## 2019-05-15 RX ADMIN — MAGNESIUM CITRATE 296 ML: 1.75 LIQUID ORAL at 10:38

## 2019-05-15 RX ADMIN — POTASSIUM CHLORIDE 20 MEQ: 1.5 POWDER, FOR SOLUTION ORAL at 22:25

## 2019-05-15 RX ADMIN — SODIUM PHOSPHATE 1 ENEMA: 7; 19 ENEMA RECTAL at 09:59

## 2019-05-15 RX ADMIN — FOLIC ACID 1 MG: 5 INJECTION, SOLUTION INTRAMUSCULAR; INTRAVENOUS; SUBCUTANEOUS at 10:25

## 2019-05-15 RX ADMIN — MIDAZOLAM 4 MG: 1 INJECTION INTRAMUSCULAR; INTRAVENOUS at 03:21

## 2019-05-15 RX ADMIN — Medication 0.3 MCG/KG/MIN: at 21:48

## 2019-05-15 RX ADMIN — FENTANYL CITRATE 25 MCG: 50 INJECTION, SOLUTION INTRAMUSCULAR; INTRAVENOUS at 20:00

## 2019-05-15 RX ADMIN — IPRATROPIUM BROMIDE AND ALBUTEROL SULFATE 3 ML: .5; 3 SOLUTION RESPIRATORY (INHALATION) at 08:28

## 2019-05-15 RX ADMIN — THIAMINE HYDROCHLORIDE 100 MG: 100 INJECTION, SOLUTION INTRAMUSCULAR; INTRAVENOUS at 07:49

## 2019-05-15 RX ADMIN — MIDAZOLAM 5 MG/HR: 5 INJECTION INTRAMUSCULAR; INTRAVENOUS at 12:28

## 2019-05-15 RX ADMIN — EPOPROSTENOL SODIUM 10 NG/KG/MIN: 1500000 INJECTION, POWDER, LYOPHILIZED, FOR SOLUTION INTRAVENOUS at 00:26

## 2019-05-15 RX ADMIN — LEVOTHYROXINE SODIUM 50 MCG: 25 TABLET ORAL at 07:53

## 2019-05-15 RX ADMIN — POTASSIUM CHLORIDE 40 MEQ: 1.5 POWDER, FOR SOLUTION ORAL at 16:22

## 2019-05-15 RX ADMIN — DEXTROSE MONOHYDRATE 50 ML: 25 INJECTION, SOLUTION INTRAVENOUS at 07:42

## 2019-05-15 RX ADMIN — DEXTROSE MONOHYDRATE 60 ML/HR: 70 INJECTION, SOLUTION INTRAVENOUS at 11:08

## 2019-05-15 RX ADMIN — FENTANYL CITRATE 100 MCG: 50 INJECTION, SOLUTION INTRAMUSCULAR; INTRAVENOUS at 11:11

## 2019-05-15 RX ADMIN — VASOPRESSIN 2.4 UNITS/HR: 20 INJECTION INTRAVENOUS at 15:46

## 2019-05-15 RX ADMIN — POLYETHYLENE GLYCOL 3350 17 G: 17 POWDER, FOR SOLUTION ORAL at 09:52

## 2019-05-15 RX ADMIN — MIDAZOLAM 2 MG: 1 INJECTION INTRAMUSCULAR; INTRAVENOUS at 13:28

## 2019-05-15 RX ADMIN — FENTANYL CITRATE 100 MCG: 50 INJECTION, SOLUTION INTRAMUSCULAR; INTRAVENOUS at 02:00

## 2019-05-15 RX ADMIN — POLYETHYLENE GLYCOL 3350 17 G: 17 POWDER, FOR SOLUTION ORAL at 13:36

## 2019-05-15 RX ADMIN — DEXTROSE MONOHYDRATE 60 ML/HR: 70 INJECTION, SOLUTION INTRAVENOUS at 02:27

## 2019-05-15 RX ADMIN — DEXTROSE MONOHYDRATE 60 ML/HR: 70 INJECTION, SOLUTION INTRAVENOUS at 19:35

## 2019-05-15 RX ADMIN — FUROSEMIDE 40 MG: 10 INJECTION, SOLUTION INTRAVENOUS at 01:29

## 2019-05-15 RX ADMIN — PANTOPRAZOLE SODIUM 40 MG: 40 TABLET, DELAYED RELEASE ORAL at 09:16

## 2019-05-15 RX ADMIN — ALBUMIN HUMAN 25 G: 0.05 INJECTION, SOLUTION INTRAVENOUS at 20:58

## 2019-05-15 RX ADMIN — MIDAZOLAM 2 MG: 1 INJECTION INTRAMUSCULAR; INTRAVENOUS at 02:17

## 2019-05-15 RX ADMIN — FUROSEMIDE 40 MG: 10 INJECTION, SOLUTION INTRAVENOUS at 09:52

## 2019-05-15 ASSESSMENT — ACTIVITIES OF DAILY LIVING (ADL)
ADLS_ACUITY_SCORE: 19
ADLS_ACUITY_SCORE: 17
ADLS_ACUITY_SCORE: 17
ADLS_ACUITY_SCORE: 19
ADLS_ACUITY_SCORE: 19
ADLS_ACUITY_SCORE: 17

## 2019-05-15 ASSESSMENT — MIFFLIN-ST. JEOR: SCORE: 1287.62

## 2019-05-15 NOTE — PLAN OF CARE
Pt remains in ICU. Mental status waxes and wanes. Followed commands, moves all extremities. Endorsed pain-PRN fentanyl given along with Fent gtt.On Cont EEG. Labile mood. Versed gtt restarted for sedation due to vent dyssynchrony and agitation. Remains intubated. Increasing O2 requirements overnight. PEEP increased to 14 and RR increased to 26 per MD order. Repeat ABG obtained. Half strength inhaled Veletri continues. Pressors titrated to keep MAP >60. Insulin gtt titrated off. On Levo and Vaso gtt. Heparin gtt continues for R internal jugular thrombus.1 small BM overnight after Fleet's enema. OG to RIAN Napoles in place for strict I&Os-UOP blue/green tinge. Electrolytes replaced per protocol. Abd US done overnight. Plan for MRI. Continue POC.    Problem: Adult Inpatient Plan of Care  Goal: Plan of Care Review  5/15/2019 0610 by Jorgito Prasad, RN  Outcome: No Change     Problem: Neurologic Impairment (Overdose)  Goal: Optimal Neurologic Function  5/15/2019 0610 by Jorgito Prasad, RN  Outcome: No Change     Problem: Hemodynamic Instability (Overdose)  Goal: Effective Tissue Perfusion  5/15/2019 0610 by Jorgito Prasad, RN  Outcome: No Change

## 2019-05-15 NOTE — PROGRESS NOTES
MICU Progress Note  Ana Laura Wharton MRN: [id number]  Age: 48 year old, : 1970  Date of Admission:2019  Primary care provider: Liborio Lincoln            Assessment and Plan  Ana Laura Wharton is a 48 year old female with a PMHx significant for recurrent UTIs, HTN, bipolar I, and anxiety/depression who was transferred from [facility] ED for medication overdose as a suicide attempt.  Now intubated and on pressors due to vasoplegia 2/2 clonidine and coreg overdose.  Mental status improving.      Neurologic  # Concern for Anoxic Brain Injury: Exam initially concerning for loss of cortical inhibition with possible posturing and clonus.  Also had dilated non-reactive pupils, but this has improved with weaning of sedation.  EEG without seizure activity.  Now interactive/reponsive, but degree of recovery still unclear.    - Neuro ICU consulted  - On video EEG  - MRI/MRA when medically stable    Sedation: Weaning versed and fentanyl as tolerated    #Suicide attempt  #Bipolar I, anxiety, depression  - Psych consult when medically stable   - Holding PTA citalopram 20mg every day, Zyprexa 10mg BID, mirtazapine 60mg QPM,   - Sitter   - Continued clonazepam 1mg TID (takes 1mg BD and 2mg at bedtime), hydroxyzine 100mg BID PRN      #EtOH Abuse: Heavy drinking recently reports  - Consider benzo for any seizure activity/agitation  - On EEG    Cardiovascular  #Clonidine/carvedilol overdose:   #Hypotension, lactic acidosis:  Patient reported taking 180 tabs total of both Coreg 6.25mg and clonidine 0.1mg on t 7pm; d/w poison control; s/p 10mg Narcan x2 and insulin gtt, intralipid, norepi, epi, phenyl, vasopressin, dopamine, and angiotensin 2.  Able to decreased Epi significantly after starting AT2.  Overnight  received methylene blue and additional fluids.  Lactate normalized.   weaned off AT2 and down to Epi and Vaso.   - ECHO with normal EF   - Stopped methylene blue        Checking G6PD to verify metabolic profile  - Cont insulin and D50gtt until off pressors  - Wean pressors as able    #Prolonged QTc  Patient presented w/ QTc of 507, has now decreased to 468    Respiratory  #Acute hypoxic/hypercapnic respiratory failure requiring intubation:  #Concern for ARDS: Has bilateral infiltrates but volume overload may present alterative cause or exacerbating factor.    - Inhaled flolan  - Diuresis as below   - Decreasing FiO2 as able    Gastrointestinal  #Elevated AST:  #Elevated ALT: Likely related to alcohol use, lower than would expect from shock.  Improving.    - CTM     #Ileus/constipation  CT abd/pelvis w/ con showed large stool burden, ileus but could not r/o SBO, and a bilobed low-density lesion along the gastric fundus (unchanged from 7/2018), duplication cyst vs neoplasm.  PO gastrografin 5/13 without significant result.  Continued bowel regimen.   - Trying fleets enema  - Monitor stool output    #Pancreatitis   #Hx of alcohol induced pancreatitis   Lipase 1,100; per chart review patient was c/o abdominal pain prior to intubation, CT abd/pelvis w/ con showed no pancreatitis.  Per friend was drinking heavily prior to admission.  Lipase normalized on 5/14 recheck.    # Nutrition:   - NPO except meds, ice chips, water for now   - Will consider nutrition consult pending resolution of ileus    Infectious Disease   # Concern for aspiration/CAP:   # Elevated procalcitonin   UA not convincing for infection; CXR w/o infiltrate, procal wnl, but has now increased.  Afebrile, but leukocytosis continues.  Could be sign of infection or anoxic brain injury and spilling from central release.     - See cultures below  - CTX for now CAP coverage    Antibiotics:  Cefepime 5/11-12  Flagyl 5/11-12  Ceftriaxone 5/13-present    Cultures:  MRSA nares 5/12 negative   UC 5/12 NGTD   Blood cultures: 5/11 NGTD  UC 5/11 >100k colonies coag - staph (susceptabilities pending)    Hematology  #Leukocytosis:  Reactive vs infectious.    - See ID section  - CTM    #Right internal jugular thrombus: suspected line associated 2/2 attempted line placement at OSH.  Interval increase on repeat US 5/14.    - Started heparin drip, high intensity    #Acute Normocytic Anemia:  Suspect dilutional, drop correlates with fluids and platelets also fell.  WBC elevated can be explained by stress response.  No evidence of bleeding currently.    - CTM    # Thrombocytopenia: Occurred in setting of massive fluid resuscitation.  Suspect largely dilutional, correlates with hgb.  Could be component of marrow suppression as well.  HIT score was low.    - CTM    Endocrine  #Glucose  - Insulin and D50 as above    #Hypothyroidism   - Cont Synthroid     Renal/Electolytes/FEN  # Oliguria and EDUARDO:  Improving  # Volume overload: BUN and creatinine mildly elevated on presentation.  FENa 0.1.  Consistent with prerenal picture.  Diuresis going well.     - Strict I/O  - Napoles in  - If UOP decreasing and abd more firm consider bladder pressure check  - Gave additional lasix, goal net negative 1-2L    #Overactive bladder:  - Cont oxybutynin, hold mirabegron     #Frequent UTIs:  - Holding cipro 125mg every day for UTI ppx given prolonged QT   - Currently on CTX as above    ACID/BASE/ELECTROLYTES:   #Hypokalemia:  - With diuresis  - On protocol     VOLUME STATUS: Hypervolemic, net up by nearly 13L this admission     Skin Care  #Pressure injury coccyx  - Turns per nursing    PPX:  DVT: Heparin as above. GI: PPI  FEN: NPO, consider TF if to remain intubated and ileus resolving   Consults: Neuro ICU, SW, Pharmacy  Tubes/Lines/Drains: Reviewed    CODE: FULL  Family: Ex  Gabriel was listed as emergency contact: [phone], boyfriend Nessa has been present at bedside [phone].  Mother is reportedly? estranged (per SO), but would like to be decision maker [phone].  Has 16 year old son who she has not spoken with and Brother Linden [phone]    Per SW  "consult.  Brother Linden will be decision maker for now  Dispo: ICU pending improved neuro status, pressor and vent weaning.       Patient discussed with staff attending, Dr. Alcantar.  Please feel free to page with questions.    Duane Hill MD    Internal Medicine PGY-1  Pager: [phone]         Interval History   Improved mental status overnight, now responsive.  Front tooth displacement noted.  Mother and SO at bedside throughout the day.         Vitals  Vital signs:  Temp: 97.9  F (36.6  C) Temp src: Axillary     Heart Rate: 96 Resp: 23 SpO2: 99 % O2 Device: Mechanical Ventilator   Height: 165.7 cm (5' 5.24\") Weight: 67.9 kg (149 lb 11.1 oz)  Estimated body mass index is 24.73 kg/m  as calculated from the following:    Height as of this encounter: 1.657 m (5' 5.24\").    Weight as of this encounter: 67.9 kg (149 lb 11.1 oz).        Ventilator  Ventilation Mode: CMV/AC  (Continuous Mandatory Ventilation/ Assist Control)  FiO2 (%): 80 %  Rate Set (breaths/minute): 20 breaths/min  Tidal Volume Set (mL): (S) 320 mL  PEEP (cm H2O): (S) 10 cmH2O  Resp: 25       Intake/Output  Date 05/13/19 0700 - 05/14/19 0659   Shift 4567-8941 3668-2315 9470-1969 24 Hour Total   INTAKE   I.V. 1424.59 396.1  1820.69   NG/GT 70   70   Shift Total(mL/kg) 1494.59(21.6) 396.1(5.72)  1890.69(27.32)   OUTPUT   Urine 2160   2160   Emesis/NG output 50 0  50   Shift Total(mL/kg) 2210(31.94) 0(0)  2210(31.94)   Weight (kg) 69.2 69.2 69.2 69.2            Physical Exam    Gen: Intubated, responding to commands  HEENT:AT/ NC, PERRL,  sclera anicteric  PULM/THORAX:Coarse breath sounds anteriorly, crackles   CV:RRR, S1 and S2 appreciated, no extra heart sounds, murmurs or rub auscultated  ABD: Distended and tympanic, no HSM appreciated.  EXT: Warm,  Edema present in hands and feet  SKIN: Capillary refill normal  NEURO: Responsive, moving all four ext.  PERRL.    "

## 2019-05-15 NOTE — PROGRESS NOTES
"[facility]    Patient Name:  Ana Laura Wharton  MRN:  [id number]    :  1970    Date of Admission:  2019  Date of Service:  May 15, 2019           Assessment and Plan:     #1 Toxic encephalopathy after drug overdose    The patient's neurologic exam is reassuring. She is expected to continue to improve as her medical problems get better. To further assess for the possibility of hypoxic-ischemic brain injury secondary to shock we will need to obtain an MRI of the brain. She has not be able to get this due to hemodynamic instability.     RECOMMENDATIONS:  1. Discontinue EEG monitoring today  2. Obtain MRI of the brain when able  3. [place] our service [place] [phone] once the MRI of the brain has been completed so we can provide final recommendations          Subjective     Ana Laura Wharton is a 48 year old lady who was admitted after clonidine and carvedilol overdose. Neurology was consulted due to a poor neurologic exam with poorly reactive dilated pupils. She has now improved to the point of being able to follow commands. EEG has shown slowing but no seizures. MRI of the brain has been recommended but not completed yet due to hemodynamic instability.          Physical Examination      Weight:143 lbs 15.37 oz; Height:5' 5.236\"  Temp: 99.2  F (37.3  C) Temp src: Axillary BP: (!) 81/46 Pulse: 95 Heart Rate: 93 Resp: 26 SpO2: 97 % O2 Device: Mechanical Ventilator      General:  Patient lying in bed without any acute distress    HEENT:  EEG leads in place  Cardio:  Regular rate and rhythm   Pulmonary:  No respiratory distress  Extremities:  No edema  Skin:  Warm/dry     Neurologic Examination:  Mental Status: Eyes open to minimal stimulation. Fixates on examiner and looks at different areas of the room.   Language: Nonverbal. Intubated. Can follow simple commands intermittently.  Speech: Nonverbal. Intubated.    Cranial Nerves: Gaze is conjugate, no gaze deviation.   Sensory/Motor: " Moves all limbs spontaneously. Moves lower extremities to command. Does not move upper extremities to command.          Labs      CBC  Recent Labs   Lab 05/15/19  1104 05/15/19  0237 05/14/19 2036 05/14/19  1050   WBC 13.4* 16.6* 19.2* 22.9*   HGB 7.2* 7.5* 7.7* 8.4*   * 114* 110* 110*       COAGS  Recent Labs   Lab 05/15/19  0949 05/15/19  0300 05/14/19  1719 05/12/19 1912 05/11/19 2018   INR  --   --   --  1.46* 1.07   AXA 0.18 0.29 0.35  --   --        INR:  Recent Labs   Lab Test 05/12/19 1912 05/11/19 2018 02/13/18  1826 01/05/18  0741 01/03/18  0617   INR 1.46* 1.07 1.04 1.10 1.04        BMP  Recent Labs   Lab 05/15/19  0949 05/15/19  0237 05/14/19  2036 05/14/19  1610    138 135 135   POTASSIUM 3.0* 3.1* 3.2* 3.2*   CHLORIDE 95 95 96 97   CO2 37* 39* 38* 37*   BUN 9 10 10 10   CR 0.58 0.69 0.66 0.65   ISAURO 7.2* 7.1* 7.5* 7.3*       Liver panel:  Recent Labs   Lab Test 05/15/19  0949 05/15/19  0237 05/14/19 2036 05/14/19  1610 05/14/19  0938   PROTTOTAL 4.2* 4.5* 4.5* 4.3* 4.4*   ALBUMIN 1.8* 2.0* 2.2* 2.0* 2.1*   BILITOTAL 1.4* 1.0 1.0 1.0 0.7   ALKPHOS 89 89 86 75 69   AST 90* 108* 120* 123* 143*   ALT 32 40 44 46 58*       ABG  Recent Labs   Lab 05/15/19  1104 05/15/19  0457 05/15/19  0136 05/15/19  0043   PH 7.41 7.41 7.38 7.33*   PCO2 66* 64* 63* 71*   PO2 75* 214* 114* 45*   HCO3 42* 40* 37* 37*       CRP/ESRNo results for input(s): CRP in the last 168 hours.    Invalid input(s): ESR    CSFNo results for input(s): CGLU, CTP in the last 168 hours.    Invalid input(s): CCSF    MICRO  Recent Labs   Lab 05/12/19  1019 05/11/19  2136 05/11/19 2109   CULT No growth No growth after 3 days No growth after 4 days  >100,000 colonies/mL  Coagulase negative Staphylococcus  *       LIPID Profile:   Cholesterol   Date Value Ref Range Status   01/09/2019 256 (H) <200 mg/dL Final     Comment:     Desirable:       <200 mg/dl   07/15/2014 223 (H) <200 mg/dL Final     Comment:     LDL Cholesterol is the  primary guide to therapy.   The NCEP recommends further evaluation of: patients with cholesterol greater   than 200 mg/dL if additional risk factors are present, cholesterol greater   than   240 mg/dL, triglycerides greater than 150 mg/dL, or HDL less than 40 mg/dL.       HDL Cholesterol   Date Value Ref Range Status   01/09/2019 48 (L) >49 mg/dL Final   07/15/2014 76 >50 mg/dL Final     LDL Cholesterol Calculated   Date Value Ref Range Status   01/09/2019 182 (H) <100 mg/dL Final     Comment:     Above desirable:  100-129 mg/dl  Borderline High:  130-159 mg/dL  High:             160-189 mg/dL  Very high:       >189 mg/dl     07/15/2014 108 0 - 129 mg/dL Final     Comment:     LDL Cholesterol is the primary guide to therapy: LDL-cholesterol goal in high   risk patients is <100 mg/dL and in very high risk patients is <70 mg/dL.       Triglycerides   Date Value Ref Range Status   01/09/2019 128 <150 mg/dL Final     Comment:     Non Fasting   12/23/2017 388 (H) <150 mg/dL Final     Comment:     Borderline high:  150-199 mg/dl  High:             200-499 mg/dl  Very high:       >499 mg/dl       Cholesterol/HDL Ratio   Date Value Ref Range Status   07/15/2014 3.0 0.0 - 5.0 Final       A1C:   Recent Labs   Lab Test 10/10/18  1557   A1C 5.0       Troponin I:   Recent Labs   Lab Test 05/11/19 2018 02/13/18  0543 12/16/17  0505 12/16/17  0205 12/13/17  1148   TROPI <0.015 <0.015 0.074* 0.102* <0.015              Imaging     Recent Results (from the past 24 hour(s))   US Abd Complete w Abd/Pel Duplex Complete Port    Narrative    EXAMINATION: US ABDOMEN COMPLETE WITH DOPPLER COMPLETE PORTABLE  5/15/2019 1:47 AM     COMPARISON: Ultrasound 12/26/2017    HISTORY: History of chronic alcohol use, please evaluate for liver  parenchymal disease, portal vein and hepatic flow    TECHNIQUE: The abdomen was scanned in standard fashion with  specialized ultrasound transducer(s) using both gray-scale, color  Doppler, and spectral flow  techniques.    Findings:  Liver: The liver demonstrates normal homogeneous echotexture. No  evidence of a focal hepatic mass. The liver measures 19.8 cm.    Extrahepatic portal vein flow is antegrade at 22 cm/second.  Right portal vein flow is antegrade, measuring 19 cm/second.  Left portal vein flow is antegrade, measuring 19 cm/second.    Flow in the hepatic artery is towards the liver and:  127 peak systolic  0.65 resistive index.     The splenic vein is patent and flow is towards the liver.  The left,  middle, and right hepatic veins are patent with flow towards the IVC.  The IVC is patent with flow towards the heart.   The visualized aorta  is not dilated.    Gallbladder: There is no wall thickening, pericholecystic fluid,  positive sonographic Bernstein's sign or evidence for cholelithiasis    Bile Ducts: Both the intra- and extrahepatic biliary system are of  normal caliber.  The common bile duct measures 3 mm.    Pancreas: Visualized portions of the head and body of the pancreas are  unremarkable.     Kidneys: Both kidneys are of normal echotexture, without mass or  hydronephrosis.   Renal lengths: right-10.6 cm, left- 8.9 cm.    Spleen: The spleen measures 9.4 cm in length.    Fluid: Moderate abdominal ascites. Bilateral pleural effusions.      Impression    Impression:   1.  Normal abdominal ultrasound with Doppler assessment.  2.   Moderate abdominal ascites.  3.   Bilateral pleural effusions.    I have personally reviewed the examination and initial interpretation  and I agree with the findings.    LARS HERRON MD

## 2019-05-15 NOTE — PROGRESS NOTES
MICU Progress Note  Ana Laura Wharton MRN: [id number]  Age: 48 year old, : 1970  Date of Admission:2019  Primary care provider: Liborio Lincoln            Assessment and Plan  Ana Laura Wharton is a 48 year old female with a PMHx significant for recurrent UTIs, HTN, bipolar I, and anxiety/depression who was transferred from [facility] ED for medication overdose as a suicide attempt.  Now intubated, sedated and on pressors due to vasoplegia 2/2 clonidine and coreg overdose.  Improving slowly.    Neurologic  # Concern for Anoxic Brain Injury: Exam initially concerning for loss of cortical inhibition with possible posturing and clonus.  Also had dilated non-reactive pupils, but this has improved with weaning of sedation.  EEG without seizure activity.  Now interactive/reponsive, but degree of recovery still unclear.    - Neuro ICU consulted  - On video EEG  - MRI/MRA when medically stable    Sedation: Weaning versed and fentanyl as tolerated    #Suicide attempt  #Bipolar I, anxiety, depression  - Psych consult when medically stable   - Holding PTA citalopram 20mg every day, Zyprexa 10mg BID, mirtazapine 60mg QPM,   - Sitter   - Continued clonazepam 1mg TID (takes 1mg BD and 2mg at bedtime), hydroxyzine 100mg BID PRN      #EtOH Abuse: Heavy drinking recently reports  - Consider benzo for any seizure activity/agitation  - IV thiamine and folate  - On EEG    Cardiovascular  #Clonidine/carvedilol overdose:   #Hypotension, lactic acidosis:  Patient reported taking 180 tabs total of both Coreg 6.25mg and clonidine 0.1mg on t 7pm; d/w poison control; s/p 10mg Narcan x2 and insulin gtt, intralipid, norepi, epi, phenyl, vasopressin, dopamine, and angiotensin 2.  Able to decreased Epi significantly after starting AT2.  Overnight  received methylene blue and additional fluids.  Lactate normalized.   weaned off AT2 and down to Epi and Vaso. Continues to require pressors.    - S/P intralipid  - ECHO with normal EF   - Stopped methylene blue       Checking G6PD to verify metabolic profile  - Insulin and D50gtt stopped overnight 5/14  - Wean pressors as able    #Prolonged QTc  Patient presented w/ QTc of 507, has now decreased to 468    Respiratory  #Acute hypoxic/hypercapnic respiratory failure requiring intubation:  #Concern for ARDS: Has bilateral infiltrates but volume overload may present alterative cause or exacerbating factor.    - Working on decreasing PEEP, TV, And decrease rate  - Inhaled flolan, will decrease as able pending tolerance to vent changes  - Diuresis as below     Gastrointestinal  #Elevated AST:  #Elevated ALT: Suspect related to alcohol use, lower than would expect from shock.  Improving.    - CTM     #Ileus/constipation  CT abd/pelvis w/ con showed large stool burden, ileus but could not r/o SBO, and a bilobed low-density lesion along the gastric fundus (unchanged from 7/2018), duplication cyst vs neoplasm.  PO gastrografin 5/13 without significant result.  Continued bowel regimen.   - Trying fleets enema  - Mag citrate today  - TID miralax  - Will clamp NGT with miralax today  - Consider manual disimpaction   - Monitor stool output    #Pancreatitis   #Hx of alcohol induced pancreatitis   Lipase 1,100; per chart review patient was c/o abdominal pain prior to intubation, CT abd/pelvis w/ con showed no pancreatitis.  Per friend was drinking heavily prior to admission.  Lipase normalized on 5/14 recheck.    # Nutrition:   - NPO except meds, ice chips, water for now   - Will consider nutrition pending resolution of ileus  - Speech eval when off sedation    Infectious Disease   # Concern for aspiration/CAP:   # Elevated procalcitonin   UA not convincing for infection; CXR w/o infiltrate, procal wnl, but has now increased.  Afebrile, but leukocytosis continues.  Could be sign of infection or anoxic brain injury and spilling from central release.  But downtrend is  reassuring for resolving infection.     - See cultures below  - CTX for now CAP coverage    Antibiotics:  Cefepime 5/11-12  Flagyl 5/11-12  Ceftriaxone 5/13-17 pending clinical course    Cultures:  MRSA nares 5/12 negative   Blood cultures: 5/11 NGTD  UC 5/11 >100k colonies coag - staph (susceptabilities pending)  UC  5/12 NGTD    Hematology  #Leukocytosis: Reactive vs infectious.  Improving  - See ID section  - CTM    #Right internal jugular thrombus: suspected line associated 2/2 attempted line placement at OSH.  Interval increase on repeat US 5/14.    - Started heparin drip, high intensity    #Acute Normocytic Anemia:  Suspect dilutional, drop correlates with fluids and platelets also fell.  WBC elevated can be explained by stress response.  No evidence of bleeding currently.    - CTM    # Thrombocytopenia: Occurred in setting of massive fluid resuscitation.  Suspect largely dilutional, correlates with hgb.  Could be component of marrow suppression as well.  HIT score was low.    - CTM    Endocrine  #Glucose  - CTM    #Hypothyroidism   - Cont Synthroid     Renal/Electolytes/FEN  # Oliguria and EDUARDO:  Improving  # Volume overload: BUN and creatinine mildly elevated on presentation.  FENa 0.1.  Consistent with prerenal picture.  Diuresis going well.     - Strict I/O  - Napoles in  - If UOP decreasing and abd more firm consider bladder pressure check  - Continuing diuresis today with lasix, goal net negative 1-2L    #Overactive bladder:  - Cont oxybutynin, hold mirabegron     #Frequent UTIs:  - Holding cipro 125mg every day for UTI ppx given prolonged QT   - Currently on CTX as above    ACID/BASE/ELECTROLYTES:   #Hypokalemia:  - With diuresis  - On protocol     VOLUME STATUS: Hypervolemic, net up by nearly 13L this admission     Skin Care  #Pressure injury coccyx  - Turns per nursing    PPX:  DVT: Heparin as above. GI: PPI  FEN: NPO, consider TF if to remain intubated and ileus resolving   Consults: Neuro ICU, SW,  "Pharmacy  Tubes/Lines/Drains: Reviewed    CODE: FULL  Family: Ex  Gabriel was listed as emergency contact: [phone], boyfriend Nessa has been present at bedside [phone].  Mother is reportedly? estranged (per SO), but would like to be decision maker [phone].  Has 16 year old son who she has not spoken with and Brother Linden [phone]    Per SW consult.  Brother Linden will be decision maker for now, however mother has expressed desire to assume this role.  Complex psycho social back drop.  Mother has been grossly appropriate with staff thus far.  Appreciate SW assistance in this matter.     Dispo: ICU pending improved neuro status, pressor and vent weaning.       Patient discussed with staff attending, Dr. Alcantar.  Please feel free to page with questions.    Duane Hill MD    Internal Medicine PGY-1  Pager: [phone]         Interval History   Insulin stopped.  Continues to be interactive.  Good response to diuresis yesterday.  Somewhat agitated overnight with worsened vent compliance.  Sedation and vent adjusted.  Better this morning.          Vitals  Vital signs:  Temp: 99.3  F (37.4  C) Temp src: Axillary BP: (!) 81/50 Pulse: 95 Heart Rate: 92 Resp: 26 SpO2: 96 % O2 Device: Mechanical Ventilator   Height: 165.7 cm (5' 5.24\") Weight: 65.3 kg (143 lb 15.4 oz)  Estimated body mass index is 23.78 kg/m  as calculated from the following:    Height as of this encounter: 1.657 m (5' 5.24\").    Weight as of this encounter: 65.3 kg (143 lb 15.4 oz).        Ventilator  Ventilation Mode: CMV/AC  (Continuous Mandatory Ventilation/ Assist Control)  FiO2 (%): 50 %  Rate Set (breaths/minute): 26 breaths/min  Tidal Volume Set (mL): (S) 340 mL  PEEP (cm H2O): (S) 14 cmH2O  Resp: 26       Intake/Output    Intake/Output Summary (Last 24 hours) at 5/15/2019 1028  Last data filed at 5/15/2019 1000  Gross per 24 hour   Intake 4087.5 ml   Output 4860 ml   Net -772.5 ml           Physical Exam    Gen: Intubated, slowly " responding to commands   HEENT:AT/ NC, PERRL,  sclera anicteric  PULM/THORAX:Coarse breath sounds anteriorly  CV:RRR, S1 and S2 appreciated, no extra heart sounds, murmurs or rub auscultated  ABD: Distended and tympanic, no HSM appreciated.  EXT: Warm,  Edema present in hands and feet  SKIN: Capillary refill normal  NEURO: Responsive, moving all four ext.  PERRL.  1-2 beats clonus

## 2019-05-15 NOTE — PLAN OF CARE
"D: Pt admitted following attempted suicide requiring mechanical ventilation and hemodynamic support.  I/A: Pt lethargic but arousable to voice/stimulation, intermittently following simple commands. Nodded \"yes\" to pain but inconsistently answering questions. Using Fentanyl gtt for pain/discomfort with PRN boluses for pain/agitation otherwise not requiring additional sedation at this time. NSR 80s-100s, requiring pressors (Levophed/Vasopressin) to keep MAP >60 (see gtt flowsheets for titration). Vent settings changed to CMV RR=20/Vt=320/PEEP=12, Veletri weaned to 1/2 dose--> post vent change/Veletri change ABG showed worsening PaO2, increased FiO2 to 60% with some improvement in PaO2; will continue to wean as tolerates. NPO. Abd XR done, continues to show stool burden. No BM despite PRN and scheduled bowel meds; MD notified of no response, will place orders for further intervention. OG intermittently to LIS with minimal output, otherwise clamped for med administration.  Napoles in place with good output, lasix given x2 (currently net neg ~1L today) still blue/green in color (r/t Methylene blue). K+/Phos replaced per protocol. Still requiring high dose insulin with D50 gtt for blood glucose management. Heparin gtt started for RIJ DVT (US done this AM with worsening results). No acute skin issues--will continue to monitor front tooth dislocation and consult appropriate MD team once extubated.   P: Continue to monitor and implement POC, notify provider of changes. Will plan on brain MRI once hemodynamically stable to transport.      Problem: Mood Alteration Comorbidity  Goal: Mood Alteration Comorbidity  Description  Patient comorbidity will be monitored for signs and symptoms of Mood Alteration condition.  Problems will be absent, minimized or managed by discharge/transition of care.  5/14/2019 1920 by Iliana Avina, RN  Outcome: No Change     Problem: Hypertension Comorbidity  Goal: Blood Pressure in Desired " Range  5/14/2019 1920 by Iliana Avina, RN  Outcome: No Change

## 2019-05-15 NOTE — PLAN OF CARE
D: Sedated on 5 versed and 100 fentanyl. Moving all extremities, following commands this a.m. Pupil equil and reactive. Restless in bed, restrain applied to left leg due to left femoral ART line. ROM preformed. HR 's. Potassium 3.0 replaced. Fleets enema, mg citrate, and miralax given, 4 small formed stools. OG clamped. Blood sugars < 70 approximately Q4H, on D50 at 60 ml/hr, additional 50 g D50 required every 4 hours. 40 mg IV lasix given. Net negative 760 ml today.   I/A: Peep decreased to 10, TV increased to 440 and RR decreased to 20. See ABG. Plateau pressure 28 on current settings. Weaned levo to .12, attempted to wean off vaso, MAP decreased to 50.   P: Continue to wean pressors and vent as able.         Problem: Mood Alteration Comorbidity  Goal: Mood Alteration Comorbidity  Description  Patient comorbidity will be monitored for signs and symptoms of Mood Alteration condition.  Problems will be absent, minimized or managed by discharge/transition of care.  5/15/2019 1811 by Deisy Morales, RN  Outcome: Improving  5/15/2019 0610 by Jorgito Prasad, RN  Outcome: No Change

## 2019-05-16 ENCOUNTER — APPOINTMENT (OUTPATIENT)
Dept: CARDIOLOGY | Facility: CLINIC | Age: 49
DRG: 987 | End: 2019-05-16
Attending: STUDENT IN AN ORGANIZED HEALTH CARE EDUCATION/TRAINING PROGRAM
Payer: COMMERCIAL

## 2019-05-16 LAB
ABO + RH BLD: NORMAL
ABO + RH BLD: NORMAL
ALBUMIN SERPL-MCNC: 2 G/DL (ref 3.4–5)
ALBUMIN SERPL-MCNC: 2.1 G/DL (ref 3.4–5)
ALP SERPL-CCNC: 106 U/L (ref 40–150)
ALP SERPL-CCNC: 110 U/L (ref 40–150)
ALT SERPL W P-5'-P-CCNC: 22 U/L (ref 0–50)
ALT SERPL W P-5'-P-CCNC: 24 U/L (ref 0–50)
ANION GAP SERPL CALCULATED.3IONS-SCNC: 4 MMOL/L (ref 3–14)
ANION GAP SERPL CALCULATED.3IONS-SCNC: 5 MMOL/L (ref 3–14)
ANION GAP SERPL CALCULATED.3IONS-SCNC: 5 MMOL/L (ref 3–14)
AST SERPL W P-5'-P-CCNC: 51 U/L (ref 0–45)
AST SERPL W P-5'-P-CCNC: 62 U/L (ref 0–45)
BASE DEFICIT BLDA-SCNC: NORMAL MMOL/L
BASE EXCESS BLDA CALC-SCNC: 10.4 MMOL/L
BASE EXCESS BLDA CALC-SCNC: 10.6 MMOL/L
BASE EXCESS BLDA CALC-SCNC: 13.1 MMOL/L
BASE EXCESS BLDA CALC-SCNC: 13.5 MMOL/L
BASE EXCESS BLDA CALC-SCNC: NORMAL MMOL/L
BASOPHILS # BLD AUTO: 0 10E9/L (ref 0–0.2)
BASOPHILS NFR BLD AUTO: 0 %
BILIRUB DIRECT SERPL-MCNC: 1.8 MG/DL (ref 0–0.2)
BILIRUB SERPL-MCNC: 2.3 MG/DL (ref 0.2–1.3)
BILIRUB SERPL-MCNC: 2.5 MG/DL (ref 0.2–1.3)
BLD GP AB SCN SERPL QL: NORMAL
BLD PROD TYP BPU: NORMAL
BLD PROD TYP BPU: NORMAL
BLD UNIT ID BPU: 0
BLOOD BANK CMNT PATIENT-IMP: NORMAL
BLOOD PRODUCT CODE: NORMAL
BPU ID: NORMAL
BUN SERPL-MCNC: 12 MG/DL (ref 7–30)
BUN SERPL-MCNC: 13 MG/DL (ref 7–30)
BUN SERPL-MCNC: 13 MG/DL (ref 7–30)
CA-I SERPL ISE-MCNC: 4 MG/DL (ref 4.4–5.2)
CA-I SERPL ISE-MCNC: 4.8 MG/DL (ref 4.4–5.2)
CALCIUM SERPL-MCNC: 8.2 MG/DL (ref 8.5–10.1)
CALCIUM SERPL-MCNC: 8.5 MG/DL (ref 8.5–10.1)
CALCIUM SERPL-MCNC: 8.7 MG/DL (ref 8.5–10.1)
CHLORIDE SERPL-SCNC: 94 MMOL/L (ref 94–109)
CHLORIDE SERPL-SCNC: 94 MMOL/L (ref 94–109)
CHLORIDE SERPL-SCNC: 95 MMOL/L (ref 94–109)
CO2 SERPL-SCNC: 34 MMOL/L (ref 20–32)
CO2 SERPL-SCNC: 35 MMOL/L (ref 20–32)
CO2 SERPL-SCNC: 37 MMOL/L (ref 20–32)
CREAT SERPL-MCNC: 0.56 MG/DL (ref 0.52–1.04)
CREAT SERPL-MCNC: 0.58 MG/DL (ref 0.52–1.04)
CREAT SERPL-MCNC: 0.66 MG/DL (ref 0.52–1.04)
DIFFERENTIAL METHOD BLD: ABNORMAL
EOSINOPHIL # BLD AUTO: 0.2 10E9/L (ref 0–0.7)
EOSINOPHIL NFR BLD AUTO: 1.7 %
ERYTHROCYTE [DISTWIDTH] IN BLOOD BY AUTOMATED COUNT: 14.8 % (ref 10–15)
ERYTHROCYTE [DISTWIDTH] IN BLOOD BY AUTOMATED COUNT: 15.5 % (ref 10–15)
ERYTHROCYTE [DISTWIDTH] IN BLOOD BY AUTOMATED COUNT: 15.7 % (ref 10–15)
GFR SERPL CREATININE-BSD FRML MDRD: >90 ML/MIN/{1.73_M2}
GLUCOSE BLDC GLUCOMTR-MCNC: 102 MG/DL (ref 70–99)
GLUCOSE BLDC GLUCOMTR-MCNC: 104 MG/DL (ref 70–99)
GLUCOSE BLDC GLUCOMTR-MCNC: 105 MG/DL (ref 70–99)
GLUCOSE BLDC GLUCOMTR-MCNC: 113 MG/DL (ref 70–99)
GLUCOSE BLDC GLUCOMTR-MCNC: 138 MG/DL (ref 70–99)
GLUCOSE BLDC GLUCOMTR-MCNC: 141 MG/DL (ref 70–99)
GLUCOSE BLDC GLUCOMTR-MCNC: 147 MG/DL (ref 70–99)
GLUCOSE BLDC GLUCOMTR-MCNC: 155 MG/DL (ref 70–99)
GLUCOSE BLDC GLUCOMTR-MCNC: 161 MG/DL (ref 70–99)
GLUCOSE BLDC GLUCOMTR-MCNC: 165 MG/DL (ref 70–99)
GLUCOSE BLDC GLUCOMTR-MCNC: 181 MG/DL (ref 70–99)
GLUCOSE BLDC GLUCOMTR-MCNC: 68 MG/DL (ref 70–99)
GLUCOSE BLDC GLUCOMTR-MCNC: 75 MG/DL (ref 70–99)
GLUCOSE BLDC GLUCOMTR-MCNC: 78 MG/DL (ref 70–99)
GLUCOSE BLDC GLUCOMTR-MCNC: 81 MG/DL (ref 70–99)
GLUCOSE BLDC GLUCOMTR-MCNC: 94 MG/DL (ref 70–99)
GLUCOSE SERPL-MCNC: 143 MG/DL (ref 70–99)
GLUCOSE SERPL-MCNC: 145 MG/DL (ref 70–99)
GLUCOSE SERPL-MCNC: 160 MG/DL (ref 70–99)
HCO3 BLD-SCNC: 36 MMOL/L (ref 21–28)
HCO3 BLD-SCNC: 37 MMOL/L (ref 21–28)
HCO3 BLD-SCNC: 39 MMOL/L (ref 21–28)
HCO3 BLD-SCNC: NORMAL MMOL/L (ref 21–28)
HCT VFR BLD AUTO: 21.8 % (ref 35–47)
HCT VFR BLD AUTO: 23.1 % (ref 35–47)
HCT VFR BLD AUTO: 23.3 % (ref 35–47)
HGB BLD-MCNC: 7.1 G/DL (ref 11.7–15.7)
HGB BLD-MCNC: 7.3 G/DL (ref 11.7–15.7)
HGB BLD-MCNC: 7.5 G/DL (ref 11.7–15.7)
LMWH PPP CHRO-ACNC: 0.33 IU/ML
LYMPHOCYTES # BLD AUTO: 1.3 10E9/L (ref 0.8–5.3)
LYMPHOCYTES NFR BLD AUTO: 11.3 %
MAGNESIUM SERPL-MCNC: 1.9 MG/DL (ref 1.6–2.3)
MCH RBC QN AUTO: 29.2 PG (ref 26.5–33)
MCH RBC QN AUTO: 29.6 PG (ref 26.5–33)
MCH RBC QN AUTO: 30.4 PG (ref 26.5–33)
MCHC RBC AUTO-ENTMCNC: 31.3 G/DL (ref 31.5–36.5)
MCHC RBC AUTO-ENTMCNC: 32.5 G/DL (ref 31.5–36.5)
MCHC RBC AUTO-ENTMCNC: 32.6 G/DL (ref 31.5–36.5)
MCV RBC AUTO: 91 FL (ref 78–100)
MCV RBC AUTO: 93 FL (ref 78–100)
MCV RBC AUTO: 94 FL (ref 78–100)
METAMYELOCYTES # BLD: 0.2 10E9/L
METAMYELOCYTES NFR BLD MANUAL: 1.7 %
MONOCYTES # BLD AUTO: 0.6 10E9/L (ref 0–1.3)
MONOCYTES NFR BLD AUTO: 5.2 %
NEUTROPHILS # BLD AUTO: 8.9 10E9/L (ref 1.6–8.3)
NEUTROPHILS NFR BLD AUTO: 80.1 %
NRBC # BLD AUTO: 0.6 10*3/UL
NRBC BLD AUTO-RTO: 5 /100
NUM BPU REQUESTED: 1
O2/TOTAL GAS SETTING VFR VENT: 50 %
O2/TOTAL GAS SETTING VFR VENT: 50 %
O2/TOTAL GAS SETTING VFR VENT: 65 %
O2/TOTAL GAS SETTING VFR VENT: 65 %
O2/TOTAL GAS SETTING VFR VENT: 70 %
O2/TOTAL GAS SETTING VFR VENT: NORMAL %
OXYHGB MFR BLD: 90 % (ref 92–100)
OXYHGB MFR BLD: NORMAL % (ref 92–100)
PCO2 BLD: 56 MM HG (ref 35–45)
PCO2 BLD: 59 MM HG (ref 35–45)
PCO2 BLD: 59 MM HG (ref 35–45)
PCO2 BLD: 60 MM HG (ref 35–45)
PCO2 BLD: 61 MM HG (ref 35–45)
PCO2 BLD: NORMAL MM HG (ref 35–45)
PH BLD: 7.39 PH (ref 7.35–7.45)
PH BLD: 7.39 PH (ref 7.35–7.45)
PH BLD: 7.42 PH (ref 7.35–7.45)
PH BLD: 7.42 PH (ref 7.35–7.45)
PH BLD: 7.45 PH (ref 7.35–7.45)
PH BLD: NORMAL PH (ref 7.35–7.45)
PHOSPHATE SERPL-MCNC: 2.4 MG/DL (ref 2.5–4.5)
PLATELET # BLD AUTO: 107 10E9/L (ref 150–450)
PLATELET # BLD AUTO: 125 10E9/L (ref 150–450)
PLATELET # BLD AUTO: 133 10E9/L (ref 150–450)
PO2 BLD: 118 MM HG (ref 80–105)
PO2 BLD: 158 MM HG (ref 80–105)
PO2 BLD: 162 MM HG (ref 80–105)
PO2 BLD: 58 MM HG (ref 80–105)
PO2 BLD: 90 MM HG (ref 80–105)
PO2 BLD: NORMAL MM HG (ref 80–105)
POTASSIUM SERPL-SCNC: 4 MMOL/L (ref 3.4–5.3)
POTASSIUM SERPL-SCNC: 4.2 MMOL/L (ref 3.4–5.3)
POTASSIUM SERPL-SCNC: 4.4 MMOL/L (ref 3.4–5.3)
PROT SERPL-MCNC: 4.5 G/DL (ref 6.8–8.8)
PROT SERPL-MCNC: 4.5 G/DL (ref 6.8–8.8)
RBC # BLD AUTO: 2.4 10E12/L (ref 3.8–5.2)
RBC # BLD AUTO: 2.47 10E12/L (ref 3.8–5.2)
RBC # BLD AUTO: 2.5 10E12/L (ref 3.8–5.2)
RBC MORPH BLD: NORMAL
SODIUM SERPL-SCNC: 133 MMOL/L (ref 133–144)
SODIUM SERPL-SCNC: 134 MMOL/L (ref 133–144)
SODIUM SERPL-SCNC: 135 MMOL/L (ref 133–144)
SPECIMEN EXP DATE BLD: NORMAL
TRANSFUSION STATUS PATIENT QL: NORMAL
TRANSFUSION STATUS PATIENT QL: NORMAL
WBC # BLD AUTO: 10.2 10E9/L (ref 4–11)
WBC # BLD AUTO: 11 10E9/L (ref 4–11)
WBC # BLD AUTO: 11.1 10E9/L (ref 4–11)

## 2019-05-16 PROCEDURE — 25000128 H RX IP 250 OP 636: Performed by: STUDENT IN AN ORGANIZED HEALTH CARE EDUCATION/TRAINING PROGRAM

## 2019-05-16 PROCEDURE — 94645 CONT INHLJ TX EACH ADDL HOUR: CPT

## 2019-05-16 PROCEDURE — 82805 BLOOD GASES W/O2 SATURATION: CPT

## 2019-05-16 PROCEDURE — P9016 RBC LEUKOCYTES REDUCED: HCPCS | Performed by: ANESTHESIOLOGY

## 2019-05-16 PROCEDURE — 85027 COMPLETE CBC AUTOMATED: CPT | Performed by: STUDENT IN AN ORGANIZED HEALTH CARE EDUCATION/TRAINING PROGRAM

## 2019-05-16 PROCEDURE — 25800030 ZZH RX IP 258 OP 636: Performed by: STUDENT IN AN ORGANIZED HEALTH CARE EDUCATION/TRAINING PROGRAM

## 2019-05-16 PROCEDURE — 85520 HEPARIN ASSAY: CPT

## 2019-05-16 PROCEDURE — 85027 COMPLETE CBC AUTOMATED: CPT | Performed by: INTERNAL MEDICINE

## 2019-05-16 PROCEDURE — 93325 DOPPLER ECHO COLOR FLOW MAPG: CPT | Mod: 26 | Performed by: INTERNAL MEDICINE

## 2019-05-16 PROCEDURE — 82248 BILIRUBIN DIRECT: CPT

## 2019-05-16 PROCEDURE — 25000128 H RX IP 250 OP 636: Performed by: ANESTHESIOLOGY

## 2019-05-16 PROCEDURE — 82803 BLOOD GASES ANY COMBINATION: CPT | Performed by: STUDENT IN AN ORGANIZED HEALTH CARE EDUCATION/TRAINING PROGRAM

## 2019-05-16 PROCEDURE — 25000125 ZZHC RX 250: Performed by: STUDENT IN AN ORGANIZED HEALTH CARE EDUCATION/TRAINING PROGRAM

## 2019-05-16 PROCEDURE — 93308 TTE F-UP OR LMTD: CPT | Mod: 26 | Performed by: INTERNAL MEDICINE

## 2019-05-16 PROCEDURE — 82803 BLOOD GASES ANY COMBINATION: CPT

## 2019-05-16 PROCEDURE — 25000132 ZZH RX MED GY IP 250 OP 250 PS 637: Performed by: STUDENT IN AN ORGANIZED HEALTH CARE EDUCATION/TRAINING PROGRAM

## 2019-05-16 PROCEDURE — 40000014 ZZH STATISTIC ARTERIAL MONITORING DAILY

## 2019-05-16 PROCEDURE — 93308 TTE F-UP OR LMTD: CPT

## 2019-05-16 PROCEDURE — 85027 COMPLETE CBC AUTOMATED: CPT

## 2019-05-16 PROCEDURE — 25000128 H RX IP 250 OP 636

## 2019-05-16 PROCEDURE — 00000146 ZZHCL STATISTIC GLUCOSE BY METER IP

## 2019-05-16 PROCEDURE — 82330 ASSAY OF CALCIUM: CPT

## 2019-05-16 PROCEDURE — 80048 BASIC METABOLIC PNL TOTAL CA: CPT | Performed by: INTERNAL MEDICINE

## 2019-05-16 PROCEDURE — 85004 AUTOMATED DIFF WBC COUNT: CPT

## 2019-05-16 PROCEDURE — 85025 COMPLETE CBC W/AUTO DIFF WBC: CPT | Performed by: STUDENT IN AN ORGANIZED HEALTH CARE EDUCATION/TRAINING PROGRAM

## 2019-05-16 PROCEDURE — 40000275 ZZH STATISTIC RCP TIME EA 10 MIN

## 2019-05-16 PROCEDURE — 84100 ASSAY OF PHOSPHORUS: CPT

## 2019-05-16 PROCEDURE — 40000556 ZZH STATISTIC PERIPHERAL IV START W US GUIDANCE

## 2019-05-16 PROCEDURE — 25800025 ZZH RX 258: Performed by: STUDENT IN AN ORGANIZED HEALTH CARE EDUCATION/TRAINING PROGRAM

## 2019-05-16 PROCEDURE — 20000004 ZZH R&B ICU UMMC

## 2019-05-16 PROCEDURE — 80053 COMPREHEN METABOLIC PANEL: CPT

## 2019-05-16 PROCEDURE — 93321 DOPPLER ECHO F-UP/LMTD STD: CPT | Mod: 26 | Performed by: INTERNAL MEDICINE

## 2019-05-16 PROCEDURE — 25000132 ZZH RX MED GY IP 250 OP 250 PS 637

## 2019-05-16 PROCEDURE — 82805 BLOOD GASES W/O2 SATURATION: CPT | Performed by: INTERNAL MEDICINE

## 2019-05-16 PROCEDURE — 27210429 ZZH NUTRITION PRODUCT INTERMEDIATE LITER

## 2019-05-16 PROCEDURE — 25800025 ZZH RX 258: Performed by: ANESTHESIOLOGY

## 2019-05-16 PROCEDURE — 94003 VENT MGMT INPAT SUBQ DAY: CPT

## 2019-05-16 PROCEDURE — 99291 CRITICAL CARE FIRST HOUR: CPT | Mod: GC | Performed by: INTERNAL MEDICINE

## 2019-05-16 PROCEDURE — 25800030 ZZH RX IP 258 OP 636: Performed by: ANESTHESIOLOGY

## 2019-05-16 PROCEDURE — 83735 ASSAY OF MAGNESIUM: CPT

## 2019-05-16 RX ORDER — FUROSEMIDE 10 MG/ML
40 INJECTION INTRAMUSCULAR; INTRAVENOUS ONCE
Status: COMPLETED | OUTPATIENT
Start: 2019-05-16 | End: 2019-05-16

## 2019-05-16 RX ORDER — POLYETHYLENE GLYCOL 3350 17 G/17G
17 POWDER, FOR SOLUTION ORAL 2 TIMES DAILY PRN
Status: DISCONTINUED | OUTPATIENT
Start: 2019-05-16 | End: 2019-05-29 | Stop reason: HOSPADM

## 2019-05-16 RX ORDER — CALCIUM CHLORIDE 100 MG/ML
2 INJECTION INTRAVENOUS; INTRAVENTRICULAR ONCE
Status: DISCONTINUED | OUTPATIENT
Start: 2019-05-16 | End: 2019-05-16

## 2019-05-16 RX ORDER — LANOLIN ALCOHOL/MO/W.PET/CERES
100 CREAM (GRAM) TOPICAL DAILY
Status: DISCONTINUED | OUTPATIENT
Start: 2019-05-17 | End: 2019-05-29 | Stop reason: HOSPADM

## 2019-05-16 RX ORDER — FOLIC ACID 1 MG/1
1 TABLET ORAL DAILY
Status: DISCONTINUED | OUTPATIENT
Start: 2019-05-17 | End: 2019-05-29 | Stop reason: HOSPADM

## 2019-05-16 RX ADMIN — HEPARIN SODIUM 1200 UNITS/HR: 10000 INJECTION, SOLUTION INTRAVENOUS at 23:42

## 2019-05-16 RX ADMIN — VASOPRESSIN 2.4 UNITS/HR: 20 INJECTION INTRAVENOUS at 08:44

## 2019-05-16 RX ADMIN — Medication 100 MCG/HR: at 19:51

## 2019-05-16 RX ADMIN — Medication 2 G: at 05:45

## 2019-05-16 RX ADMIN — FENTANYL CITRATE 25 MCG: 50 INJECTION, SOLUTION INTRAMUSCULAR; INTRAVENOUS at 20:06

## 2019-05-16 RX ADMIN — Medication 100 MCG/HR: at 10:28

## 2019-05-16 RX ADMIN — LEVOTHYROXINE SODIUM 50 MCG: 25 TABLET ORAL at 08:30

## 2019-05-16 RX ADMIN — MIDAZOLAM 3 MG: 1 INJECTION INTRAMUSCULAR; INTRAVENOUS at 22:32

## 2019-05-16 RX ADMIN — PANTOPRAZOLE SODIUM 40 MG: 40 TABLET, DELAYED RELEASE ORAL at 08:31

## 2019-05-16 RX ADMIN — THIAMINE HYDROCHLORIDE 100 MG: 100 INJECTION, SOLUTION INTRAMUSCULAR; INTRAVENOUS at 08:32

## 2019-05-16 RX ADMIN — FUROSEMIDE 40 MG: 10 INJECTION, SOLUTION INTRAVENOUS at 13:47

## 2019-05-16 RX ADMIN — MIDAZOLAM 7 MG/HR: 5 INJECTION INTRAMUSCULAR; INTRAVENOUS at 22:29

## 2019-05-16 RX ADMIN — CEFTRIAXONE 1 G: 1 INJECTION, POWDER, FOR SOLUTION INTRAMUSCULAR; INTRAVENOUS at 16:28

## 2019-05-16 RX ADMIN — FOLIC ACID 1 MG: 5 INJECTION, SOLUTION INTRAMUSCULAR; INTRAVENOUS; SUBCUTANEOUS at 08:30

## 2019-05-16 RX ADMIN — MIDAZOLAM 4 MG: 1 INJECTION INTRAMUSCULAR; INTRAVENOUS at 20:06

## 2019-05-16 RX ADMIN — MIDAZOLAM 5 MG/HR: 5 INJECTION INTRAMUSCULAR; INTRAVENOUS at 07:54

## 2019-05-16 RX ADMIN — IPRATROPIUM BROMIDE AND ALBUTEROL SULFATE 3 ML: .5; 3 SOLUTION RESPIRATORY (INHALATION) at 21:19

## 2019-05-16 RX ADMIN — DEXTROSE MONOHYDRATE 60 ML/HR: 70 INJECTION, SOLUTION INTRAVENOUS at 04:16

## 2019-05-16 RX ADMIN — HEPARIN SODIUM 1200 UNITS/HR: 10000 INJECTION, SOLUTION INTRAVENOUS at 04:55

## 2019-05-16 RX ADMIN — IPRATROPIUM BROMIDE AND ALBUTEROL SULFATE 3 ML: .5; 3 SOLUTION RESPIRATORY (INHALATION) at 12:26

## 2019-05-16 RX ADMIN — FENTANYL CITRATE 50 MCG: 50 INJECTION, SOLUTION INTRAMUSCULAR; INTRAVENOUS at 20:02

## 2019-05-16 RX ADMIN — DEXTROSE MONOHYDRATE 50 ML: 25 INJECTION, SOLUTION INTRAVENOUS at 16:04

## 2019-05-16 RX ADMIN — IPRATROPIUM BROMIDE AND ALBUTEROL SULFATE 3 ML: .5; 3 SOLUTION RESPIRATORY (INHALATION) at 08:12

## 2019-05-16 RX ADMIN — FENTANYL CITRATE 100 MCG: 50 INJECTION, SOLUTION INTRAMUSCULAR; INTRAVENOUS at 17:14

## 2019-05-16 RX ADMIN — IPRATROPIUM BROMIDE AND ALBUTEROL SULFATE 3 ML: .5; 3 SOLUTION RESPIRATORY (INHALATION) at 16:51

## 2019-05-16 RX ADMIN — MULTIVITAMIN 15 ML: LIQUID ORAL at 16:10

## 2019-05-16 RX ADMIN — CALCIUM CHLORIDE 2 G: 100 INJECTION, SOLUTION INTRAVENOUS at 01:23

## 2019-05-16 RX ADMIN — POTASSIUM PHOSPHATE, MONOBASIC AND POTASSIUM PHOSPHATE, DIBASIC 15 MMOL: 224; 236 INJECTION, SOLUTION INTRAVENOUS at 08:21

## 2019-05-16 ASSESSMENT — ACTIVITIES OF DAILY LIVING (ADL)
ADLS_ACUITY_SCORE: 17

## 2019-05-16 ASSESSMENT — MIFFLIN-ST. JEOR: SCORE: 1313.62

## 2019-05-16 NOTE — PROGRESS NOTES
MICU Progress Note  Ana Laura Wharton MRN: [id number]  Age: 48 year old, : 1970  Date of Admission:2019  Primary care provider: Liborio Lincoln            Assessment and Plan  Ana Laura Wharton is a 48 year old female with a PMHx significant for recurrent UTIs, HTN, bipolar I, and anxiety/depression who was transferred from [facility] ED for medication overdose as a suicide attempt.  Now intubated, sedated and on pressors due to vasoplegia 2/2 clonidine and coreg overdose.  Improving slowly, mental status recovery remains unclear.    Neurologic  # Concern for Anoxic Brain Injury: Exam initially concerning for loss of cortical inhibition with possible posturing and clonus.  Also had dilated non-reactive pupils, but this has improved with weaning of sedation.  EEG without seizure activity.  Now interactive/reponsive, but degree of recovery still unclear.    - Neuro ICU consulted  - MRI/MRA planned for this evening    Sedation: Weaning versed and fentanyl as tolerated, will consider change to precedex    #Suicide attempt  #Bipolar I, anxiety, depression  - Psych consult when medically stable   - Holding PTA citalopram 20mg every day, Zyprexa 10mg BID, mirtazapine 60mg QPM,   - Sitter   - Continued clonazepam 1mg TID (takes 1mg BD and 2mg at bedtime), hydroxyzine 100mg BID PRN      #EtOH Abuse: Heavy drinking recently reports  - Consider benzo for any seizure activity/agitation  - IV thiamine and folate    Cardiovascular  #Clonidine/carvedilol overdose:   #Hypotension, lactic acidosis:  Patient reported taking 180 tabs total of both Coreg 6.25mg and clonidine 0.1mg on t 7pm; d/w poison control; s/p 10mg Narcan x2 and insulin gtt, intralipid, norepi, epi, phenyl, vasopressin, dopamine, and angiotensin 2.  Able to decreased Epi significantly after starting AT2.  Overnight  received methylene blue and additional fluids.  Lactate normalized.   weaned off AT2 and down  to Epi and Vaso.  Insulin gtt stopped overnight 5/14. Continues to require pressors.   - ECHO with normal EF              Repeating ECHO 5/16 for interval evaluation of cardiac function             Will also check venous oxyhemoglobin  - Wean pressors as able    #Prolonged QTc  Patient presented w/ QTc of 507, has now decreased to 468    Respiratory  #Acute hypoxic/hypercapnic respiratory failure requiring intubation:  #Concern for ARDS: Has bilateral infiltrates but volume overload may present alterative cause or exacerbating factor.    - Working on decreasing vent support, PEEP to 8, goal 5 by 5/17  - Inhaled flolan topped 5/16  - Diuresis as below     Gastrointestinal  #Elevated AST:  #Elevated ALT: Suspect related to alcohol use, lower than would expect from shock.  Improving.    - CTM     #Elevated bilirubin: Direct predominant.  Suspect cholestasis.   - CTM    # Nutrition:   - NPO except meds, ice chips, water for now   - Nutrition to start TF  - Speech eval when off sedation     #Ileus/constipation: Resolved  CT abd/pelvis w/ con showed large stool burden, ileus but could not r/o SBO, and a bilobed low-density lesion along the gastric fundus (unchanged from 7/2018), duplication cyst vs neoplasm.  PO gastrografin 5/13 without significant result.  Continued bowel regimen.     #Pancreatitis: Resolved   #Hx of alcohol induced pancreatitis   Lipase 1,100; per chart review patient was c/o abdominal pain prior to intubation, CT abd/pelvis w/ con showed no pancreatitis.  Per friend was drinking heavily prior to admission.  Lipase normalized on 5/14 recheck.    Infectious Disease   # Concern for aspiration/CAP:   # Elevated procalcitonin   UA not convincing for infection; CXR w/o infiltrate, procal wnl, but has now increased.  Afebrile, but leukocytosis continues.  Could be sign of infection or anoxic brain injury and spilling from central release.  But downtrend is reassuring for resolving infection.   Do note single  elevated temp 5/15.    - See cultures below  - CTX for now CAP coverage  - Low threshold to broaden for hospital acquired infections    Antibiotics:  Cefepime 5/11-12  Flagyl 5/11-12  Ceftriaxone 5/13-17 pending clinical course    Cultures:  MRSA nares 5/12 negative   Blood cultures: 5/11 NGTD, 5/15 NGTD  UC 5/11 >100k colonies coag - staph (susceptabilities pending)  UC  5/12 NGTD  Sputum 5/15: mixed mina      Hematology  #Leukocytosis: Reactive vs infectious.  Improving  - See ID section  - CTM    #Right internal jugular thrombus: suspected line associated 2/2 attempted line placement at OSH.  Interval increase on repeat US 5/14.    - Heparin drip, high intensity    #Acute Normocytic Anemia:  Suspect initial drop was dilutional.  However interval drop 5/15-16 unclear cause.  No overt bleeding at this time.  Direct predominant hyperbilirubinemia.  Low suspicion for hemolysis.  Possibly still related to fluid shifts or lab fluctuation.  Will watch closely.    - Transfuse for hgb <7  - CTM    # Thrombocytopenia: Occurred in setting of massive fluid resuscitation.  Suspect largely dilutional, correlates with hgb.  Could be component of marrow suppression as well.  HIT score was low.  Platelets stable  - CTM    Endocrine  #Glucose  - Still requiring D50  - CTM    #Hypothyroidism   - Cont Synthroid     Renal/Electolytes/FEN  # Oliguria and EDUARDO:  Improving  # Volume overload: BUN and creatinine mildly elevated on presentation.  FENa was 0.1%.  Consistent with prerenal picture.  Diuresis going well.     - Strict I/O  - Napoles in  - Continuing diuresis, lasix, goal net negative 1-2L    #Overactive bladder:  - Cont oxybutynin, hold mirabegron     #Frequent UTIs:  - Holding cipro 125mg every day for UTI ppx given prolonged QT   - Currently on CTX as above    ACID/BASE/ELECTROLYTES:   # Metabolic alkalosis:  - Consider acetazolamide for bicarb diuresis if not improving    # Electrolytes:  - On protocol     VOLUME STATUS:  "Remains hypervolemic, diuresis as above    Skin Care  #Pressure injury coccyx  - Turns per nursing    PPX:  DVT: Heparin as above. GI: PPI  FEN: NPO, consider TF if to remain intubated and ileus resolving   Consults: Neuro ICU, SW, Pharmacy  Tubes/Lines/Drains: Reviewed    CODE: FULL  Family: Ex  Gabriel was listed as emergency contact: [phone], boyfriend Nessa has been present at bedside [phone].  Mother is reportedly? estranged (per SO), but would like to be decision maker [phone].  Has 16 year old son who she has not spoken with and Brother Linden [phone]    Per SW consult.  Brother Linden will be decision maker for now, however mother has expressed desire to assume this role.  Complex psycho social back drop.  Mother has been grossly appropriate with staff thus far.  Appreciate SW assistance in this matter.     Dispo: ICU pending improved neuro status, pressor and vent weaning.       Patient discussed with staff attending, Dr. Alcantar.  Please feel free to page with questions.    Duane Hill MD    Internal Medicine PGY-1  Pager: [phone]         Interval History   Hgb down overnight.  Transfused 1 unit.  Fever to 100.5.  Cultures drawn no change in ABX.  Remains on 2 pressors.  Stooling.          Vitals  Vital signs:  Temp: 98.9  F (37.2  C) Temp src: Axillary BP: (!) 84/49   Heart Rate: 88 Resp: 20 SpO2: 97 % O2 Device: Mechanical Ventilator   Height: 165.7 cm (5' 5.24\") Weight: 67.9 kg (149 lb 11.1 oz)  Estimated body mass index is 24.73 kg/m  as calculated from the following:    Height as of this encounter: 1.657 m (5' 5.24\").    Weight as of this encounter: 67.9 kg (149 lb 11.1 oz).        Ventilator  Ventilation Mode: CMV/AC  (Continuous Mandatory Ventilation/ Assist Control)  FiO2 (%): 50 %  Rate Set (breaths/minute): 26 breaths/min  Tidal Volume Set (mL): (S) 340 mL  PEEP (cm H2O): (S) 14 cmH2O  Resp: 26       Intake/Output      Intake/Output Summary (Last 24 hours) at 5/16/2019 " 2041  Last data filed at 5/16/2019 2030  Gross per 24 hour   Intake 3405.27 ml   Output 2935 ml   Net 470.27 ml           Physical Exam    Gen: Intubated, slowly responding to commands   HEENT:AT/ NC, PERRL,  sclera anicteric  PULM/THORAX:Coarse breath sounds anteriorly  CV:RRR, S1 and S2 appreciated, no extra heart sounds, murmurs or rub auscultated  ABD: Distended and tympanic, no HSM appreciated.  EXT: Warm,  Edema present in hands and feet  SKIN: Capillary refill normal  NEURO: Responsive, moving all four ext.  PERRL.  1-2 beats clonus

## 2019-05-16 NOTE — PROCEDURES
[facility] EEG #-4 (Day 4 of Video-EEG Monitoring)    DATE OF RECORDIN/15/2019    DURATION OF RECORDIN hours, 3 minutes.    CLINICAL SUMMARY:  This video-EEG monitoring procedure is performed in evaluation of encephalopathy in Ana Laura Wharton.  She was reported to be mechanically ventilated with continuous parenteral sedation by fentanyl and midazolam.      TECHNICAL SUMMARY:  This continuous EEG monitoring procedure was performed with 23 scalp electrodes in 10-20 system placements, and additional scalp, precordial and other surface electrodes used for electrical referencing and artifact detection.  Video monitoring was utilized and periodically reviewed by EEG technologists and the physician for electroclinical correlation.     EEG ACTIVITIES DURING COMA:  During ongoing sedated coma, there was no evidence of wake-sleep cycling.  There was continuous irregular generalized 2-8 Hz delta-theta slowing, with intermixed lower amplitude faster frequencies.      No interictal epileptiform abnormalities and no electrographic seizures were recorded during this day of monitoring.      SUMMARY OF DAY 4 OF VIDEO-EEG MONITORING:    The interictal EEG during sedated coma was abnormal due to absence of wake-sleep cycling, with continuous moderate amplitude generalized delta-theta slowing and intermixed lower amplitude faster frequencies.   No interictal epileptiform abnormalities and no electrographic seizures were recorded during this day of monitoring.     SUMMARY OF 4 DAYS OF VIDEO-EEG MONITORING:    During continuously sedated coma, there was no evidence of wake-sleep cycling throughout the 4 days of monitoring, and background activities consisted of continuous generalized delta-theta slowing with intermixed lower amplitude faster frequencies, initially with a relatively low amplitude of delta-theta slowing, but subsequently with moderate amplitude of delta-theta slowing.    On Day 2 of video-EEG monitoring,  there were periods of nonstereotyped abnormal movements, which were not associated with electrographic seizure activity.    No interictal epileptiform abnormalities and no electrographic seizures were recorded over the course of video EEG monitoring.    These findings indicate severe generalized cerebral dysfunction, which is etiologically nonspecific.  Clinical correlation is recommended.   David Grullon M.D., Professor of Neurology       D: 05/15/2019   T: 05/15/2019   MT: VERN      Name:     KEE TINEO   MRN:      [id number]        Account:        [id number]   :      1970           Procedure Date: 05/15/2019      Document: [id number]

## 2019-05-16 NOTE — PROGRESS NOTES
"CLINICAL NUTRITION SERVICES - BRIEF NOTE  **For full assessment, see RD note from 5/14   Nutrition Prescription    RECOMMENDATIONS FOR MDs/PROVIDERS TO ORDER:  1. Pt is at high risk of refeeding syndrome given minimal nutrition outside of D50 since admission --> monitor K, Mg, Phos daily and recommend HIGH replacement protocol.    2. Additional fluid flushes for hydration per MD discretion. Currently only patency flushes ordered.    Recommendations already ordered by Registered Dietitian (RD):  Start Nutren 1.5 @ 15 mL/hr and adv by 10 mL q8h until @ goal 45 mL/hr --> Provides 1620 kcals (33 kcal/kg/day), 73 g PRO (1.5 g/kg/day), 821 mL H2O, 190 g CHO and no Fiber daily.  - Do not start or advance until lytes (Mg++,K+) WNL and phos>1.9   - 30 ml q4hr fluid flushes for tube patency. Additional fluids and/or adjustments per MD.    - Ordered multivitamin/mineral (15 ml/day via FT) to help ensure micronutrient needs being met with suspected hypermetabolic demands and potential interruptions to TF infusions.  - Aspiration precautions with gastric feeds    Future/Additional Recommendations:   -Monitor lytes/tolerance and adv to goal TF rate     Reason for note: Team called RD that it is now appropriate to feed via OGT (ileus passed). Team placed consult \"Registered Dietitian to Assess and Order TF per Medical Nutrition Therapy Recommendations\".     EVALUATION OF THE PROGRESS TOWARD GOALS   Diet: NPO    Enteral Access: OGT XR verified on 5/12/19 --> ok for gastric feeds per team    Intake: No PO since admission.      NEW FINDINGS   -GI: No evidence of pancreatitis per CT abd/pelvis and lipase (161) now normal on 5/14.    -Labs: Phos 2.4 (L); K 4.2 (WNL); Mg 1.9 (WNL)    INTERVENTIONS  Implementation  -Collaboration with other providers: Discussed nutrition POC with primary team.   -Enteral Nutrition - Initiate  -Feeding tube flush  -Multivitamin/mineral supplement therapy - Certavite    Monitoring/Evaluation  Progress " toward goals will be monitored and evaluated per protocol.    Mary Jane Garcia RD, LD  Pager: [phone]

## 2019-05-16 NOTE — PLAN OF CARE
D: Pupils equil, not following commands. Moving all extremities. Sedated on versed at 5 and fentanyl at 100. SR on monitor, HR 's. Afebrile. Tube feedings started 1 BM today. D50 weaned off. Blood sugars 90's. 40 mg of IV lasix given with good response.    I/A: Vasopressin stopped, Levophed at .12 to keep MAP > 60. Peep decreased to 8. See ABG.   P: continue to wean pressors and vent as able. Brain MRI tonight or tomorrow.       Problem: Mood Alteration Comorbidity  Goal: Mood Alteration Comorbidity  Description  Patient comorbidity will be monitored for signs and symptoms of Mood Alteration condition.  Problems will be absent, minimized or managed by discharge/transition of care.  5/16/2019 1823 by Deisy Morales, RN  Outcome: Improving     Problem: Hypertension Comorbidity  Goal: Blood Pressure in Desired Range  5/16/2019 1823 by Deisy Morales, RN  Outcome: Improving

## 2019-05-16 NOTE — PLAN OF CARE
Neuro: Sedated/lethargic. Localizing pain. Pupils equal. Fentanyl/versed drip.   Cards: T max 100.5. SR. MD notified of temp. New order for chest xray, blood cultures x 2, urine and sputum culture.   Resp: CMV settings. Titrating up and down on 02. Thick/tan secretions. Flolan reduced to 5 ng.   GI/: NPO, OG in place. Napoles in place, adequate urine output. No further stool overnight.   Hem: Hgb 6.5, MD notified. New order for 1 unit of RBCs. K, Mg, calcium replaced. Will need phos replaced when available. Heparin 10a is therapeutic.   Skin: Intact except for scabs/cuts on legs. Ordered zflow pillow to reduce risk of pressure injuries on head. Restraint on left leg to prevent bending of leg/damage to art line cathter. No evidence of learning continue to monitor for skin break down and discharge criteria.   Plan: Monitor for change in condition. Go to MRI when weaned off flolan. Needs to complete check list.     Problem: Adult Inpatient Plan of Care  Goal: Plan of Care Review  5/16/2019 0606 by Tanja Torres RN  Outcome: No Change  Goal: Patient-Specific Goal (Individualization)  5/16/2019 0606 by Tanja Torres RN  Outcome: No Change  Goal: Absence of Hospital-Acquired Illness or Injury  5/16/2019 0606 by Tanja Torres RN  Outcome: No Change  Goal: Optimal Comfort and Wellbeing  5/16/2019 0606 by Tanja Torres RN  Outcome: No Change  Goal: Readiness for Transition of Care  5/16/2019 0606 by Tanja Torres RN  Outcome: No Change  Goal: Rounds/Family Conference  5/16/2019 0606 by Tanja Torres RN  Outcome: No Change  Problem: Adjustment to Illness (Overdose)  Goal: Optimal Coping  5/16/2019 0606 by Tanja Torres RN  Outcome: No Change  Problem: Arrhythmia/Dysrhythmia (Overdose)  Goal: Stable Heart Rate and Rhythm  5/16/2019 0606 by Tanja Torres RN  Outcome: No Change  Problem: Hemodynamic Instability (Overdose)  Goal: Effective Tissue Perfusion  5/16/2019 0606 by Tanja Torres RN  Outcome: No Change    Problem: Neurologic  Impairment (Overdose)  Goal: Optimal Neurologic Function  5/16/2019 0606 by Tanja Torres RN  Outcome: No Change     Problem: Respiratory Compromise (Overdose)  Goal: Effective Oxygenation and Ventilation  5/16/2019 0606 by Tajna Torres RN  Outcome: No Change    Problem: Mood Alteration Comorbidity  Goal: Mood Alteration Comorbidity  Description  Patient comorbidity will be monitored for signs and symptoms of Mood Alteration condition.  Problems will be absent, minimized or managed by discharge/transition of care.  5/16/2019 0606 by Tanja Torres RN  Outcome: No Change    Problem: Hypertension Comorbidity  Goal: Blood Pressure in Desired Range  5/16/2019 0606 by Tanja Torres RN  Outcome: No Change    Problem: Restraint for Non-Violent/Non-Self-Destructive Behavior  Goal: Prevent/Manage Potential Problems  Description  Maintain safety of patient and others during period of restraint.  Promote psychological and physical wellbeing.  Prevent injury to skin and involved body parts.  Promote nutrition, hydration, and elimination.  5/16/2019 0606 by Tanja Torres RN  Outcome: No Change

## 2019-05-16 NOTE — PHARMACY-CONSULT NOTE
Pharmacy Tube Feeding Consult    Medication reviewed for administration by feeding tube and for potential food/drug interactions.    Recommendation: Recommend changing the following medications to an enteral dosage form: folic acid and thiamine.     Pharmacy will continue to follow as new medications are ordered.    Mecca Alcantar, PharmD  May 16, 2019

## 2019-05-16 NOTE — PROCEDURES
[facility] EEG #-3 (Day 3 of Video-EEG Monitoring)    DATE OF RECORDIN2019    DURATION OF RECORDIN hours, 34 minutes.    CLINICAL SUMMARY:  This diagnostic video-EEG monitoring procedure is performed in evaluation of encephalopathy in Kee Wharton.  She was reported to be mechanically ventilated with continuous parenteral sedation by fentanyl and midazolam.      TECHNICAL SUMMARY:  This continuous EEG monitoring procedure was performed with 23 scalp electrodes in 10-20 system placements, and additional scalp, precordial and other surface electrodes used for electrical referencing and artifact detection.  Video monitoring was utilized and periodically reviewed by EEG technologists and the physician for electroclinical correlation.     EEG ACTIVITIES DURING COMA:  During ongoing sedated coma, there was no evidence of wake-sleep cycling.  There was continuous moderate amplitude irregular 2-8 Hz delta-theta slowing with intermixed lower amplitude faster frequencies.      No interictal epileptiform abnormalities and no electrographic seizures occurred on this day of monitoring.      SUMMARY OF DAY 3 OF VIDEO-EEG MONITORING:    The EEG recording during sedated coma was abnormal due to the absence of wake-sleep cycling, with continuous moderate amplitude generalized delta-theta slowing.   No interictal epileptiform abnormalities and no electrographic seizures occurred on this day of monitoring.    These findings indicate severe generalized cerebral dysfunction, which is etiologically nonspecific.  Clinical correlation is recommended.    David Grullon M.D., Professor of Neurology       D: 05/15/2019   T: 05/15/2019   MT:       Name:     KEE WHARTON   MRN:      [id number]        Account:        [id number]   :      1970           Procedure Date: 2019      Document: [id number]

## 2019-05-17 ENCOUNTER — APPOINTMENT (OUTPATIENT)
Dept: GENERAL RADIOLOGY | Facility: CLINIC | Age: 49
DRG: 987 | End: 2019-05-17
Attending: STUDENT IN AN ORGANIZED HEALTH CARE EDUCATION/TRAINING PROGRAM
Payer: COMMERCIAL

## 2019-05-17 ENCOUNTER — APPOINTMENT (OUTPATIENT)
Dept: MRI IMAGING | Facility: CLINIC | Age: 49
DRG: 987 | End: 2019-05-17
Attending: ANESTHESIOLOGY
Payer: COMMERCIAL

## 2019-05-17 LAB
ALBUMIN SERPL-MCNC: 2.2 G/DL (ref 3.4–5)
ALBUMIN SERPL-MCNC: 2.2 G/DL (ref 3.4–5)
ALP SERPL-CCNC: 147 U/L (ref 40–150)
ALP SERPL-CCNC: 169 U/L (ref 40–150)
ALT SERPL W P-5'-P-CCNC: 19 U/L (ref 0–50)
ALT SERPL W P-5'-P-CCNC: 21 U/L (ref 0–50)
ANION GAP SERPL CALCULATED.3IONS-SCNC: 4 MMOL/L (ref 3–14)
ANION GAP SERPL CALCULATED.3IONS-SCNC: <1 MMOL/L (ref 3–14)
AST SERPL W P-5'-P-CCNC: 46 U/L (ref 0–45)
AST SERPL W P-5'-P-CCNC: 48 U/L (ref 0–45)
BACTERIA SPEC CULT: NO GROWTH
BACTERIA SPEC CULT: NORMAL
BASE EXCESS BLDA CALC-SCNC: 10 MMOL/L
BASE EXCESS BLDA CALC-SCNC: 10.1 MMOL/L
BASE EXCESS BLDA CALC-SCNC: 15.4 MMOL/L
BILIRUB SERPL-MCNC: 2.6 MG/DL (ref 0.2–1.3)
BILIRUB SERPL-MCNC: 3.1 MG/DL (ref 0.2–1.3)
BUN SERPL-MCNC: 13 MG/DL (ref 7–30)
BUN SERPL-MCNC: 14 MG/DL (ref 7–30)
CALCIUM SERPL-MCNC: 8 MG/DL (ref 8.5–10.1)
CALCIUM SERPL-MCNC: 8.8 MG/DL (ref 8.5–10.1)
CHLORIDE SERPL-SCNC: 111 MMOL/L (ref 94–109)
CHLORIDE SERPL-SCNC: 112 MMOL/L (ref 94–109)
CO2 SERPL-SCNC: 35 MMOL/L (ref 20–32)
CO2 SERPL-SCNC: 37 MMOL/L (ref 20–32)
CREAT SERPL-MCNC: 0.63 MG/DL (ref 0.52–1.04)
CREAT SERPL-MCNC: 0.68 MG/DL (ref 0.52–1.04)
ERYTHROCYTE [DISTWIDTH] IN BLOOD BY AUTOMATED COUNT: 15.4 % (ref 10–15)
ERYTHROCYTE [DISTWIDTH] IN BLOOD BY AUTOMATED COUNT: 15.6 % (ref 10–15)
GFR SERPL CREATININE-BSD FRML MDRD: >90 ML/MIN/{1.73_M2}
GFR SERPL CREATININE-BSD FRML MDRD: >90 ML/MIN/{1.73_M2}
GLUCOSE BLDC GLUCOMTR-MCNC: 118 MG/DL (ref 70–99)
GLUCOSE BLDC GLUCOMTR-MCNC: 121 MG/DL (ref 70–99)
GLUCOSE BLDC GLUCOMTR-MCNC: 129 MG/DL (ref 70–99)
GLUCOSE BLDC GLUCOMTR-MCNC: 147 MG/DL (ref 70–99)
GLUCOSE BLDC GLUCOMTR-MCNC: 175 MG/DL (ref 70–99)
GLUCOSE BLDC GLUCOMTR-MCNC: 96 MG/DL (ref 70–99)
GLUCOSE SERPL-MCNC: 120 MG/DL (ref 70–99)
GLUCOSE SERPL-MCNC: 124 MG/DL (ref 70–99)
HCO3 BLD-SCNC: 36 MMOL/L (ref 21–28)
HCO3 BLD-SCNC: 36 MMOL/L (ref 21–28)
HCO3 BLD-SCNC: 39 MMOL/L (ref 21–28)
HCT VFR BLD AUTO: 23.2 % (ref 35–47)
HCT VFR BLD AUTO: 23.4 % (ref 35–47)
HGB BLD-MCNC: 7.2 G/DL (ref 11.7–15.7)
HGB BLD-MCNC: 7.3 G/DL (ref 11.7–15.7)
LMWH PPP CHRO-ACNC: 0.26 IU/ML
LMWH PPP CHRO-ACNC: 0.31 IU/ML
Lab: NORMAL
MAGNESIUM SERPL-MCNC: 1.9 MG/DL (ref 1.6–2.3)
MCH RBC QN AUTO: 28.9 PG (ref 26.5–33)
MCH RBC QN AUTO: 29.1 PG (ref 26.5–33)
MCHC RBC AUTO-ENTMCNC: 31 G/DL (ref 31.5–36.5)
MCHC RBC AUTO-ENTMCNC: 31.2 G/DL (ref 31.5–36.5)
MCV RBC AUTO: 93 FL (ref 78–100)
MCV RBC AUTO: 94 FL (ref 78–100)
O2/TOTAL GAS SETTING VFR VENT: 50 %
O2/TOTAL GAS SETTING VFR VENT: 55 %
O2/TOTAL GAS SETTING VFR VENT: 60 %
OSMOLALITY SERPL: 315 MMOL/KG (ref 275–295)
OSMOLALITY UR: 417 MMOL/KG (ref 100–1200)
OXYHGB MFR BLD: 92 % (ref 92–100)
OXYHGB MFR BLD: 92 % (ref 92–100)
PCO2 BLD: 47 MM HG (ref 35–45)
PCO2 BLD: 56 MM HG (ref 35–45)
PCO2 BLD: 60 MM HG (ref 35–45)
PH BLD: 7.39 PH (ref 7.35–7.45)
PH BLD: 7.42 PH (ref 7.35–7.45)
PH BLD: 7.53 PH (ref 7.35–7.45)
PHOSPHATE SERPL-MCNC: 2.3 MG/DL (ref 2.5–4.5)
PLATELET # BLD AUTO: 163 10E9/L (ref 150–450)
PLATELET # BLD AUTO: 163 10E9/L (ref 150–450)
PO2 BLD: 105 MM HG (ref 80–105)
PO2 BLD: 68 MM HG (ref 80–105)
PO2 BLD: 73 MM HG (ref 80–105)
POTASSIUM SERPL-SCNC: 3.5 MMOL/L (ref 3.4–5.3)
POTASSIUM SERPL-SCNC: 3.7 MMOL/L (ref 3.4–5.3)
PROCALCITONIN SERPL-MCNC: 0.51 NG/ML
PROT SERPL-MCNC: 5.2 G/DL (ref 6.8–8.8)
PROT SERPL-MCNC: 5.3 G/DL (ref 6.8–8.8)
RADIOLOGIST FLAGS: ABNORMAL
RBC # BLD AUTO: 2.47 10E12/L (ref 3.8–5.2)
RBC # BLD AUTO: 2.53 10E12/L (ref 3.8–5.2)
SODIUM SERPL-SCNC: 144 MMOL/L (ref 133–144)
SODIUM SERPL-SCNC: 148 MMOL/L (ref 133–144)
SODIUM SERPL-SCNC: 149 MMOL/L (ref 133–144)
SODIUM SERPL-SCNC: 152 MMOL/L (ref 133–144)
SODIUM SERPL-SCNC: 153 MMOL/L (ref 133–144)
SPECIMEN SOURCE: NORMAL
SPECIMEN SOURCE: NORMAL
WBC # BLD AUTO: 8.4 10E9/L (ref 4–11)
WBC # BLD AUTO: 8.8 10E9/L (ref 4–11)

## 2019-05-17 PROCEDURE — 40000281 ZZH STATISTIC TRANSPORT TIME EA 15 MIN

## 2019-05-17 PROCEDURE — 83735 ASSAY OF MAGNESIUM: CPT | Performed by: STUDENT IN AN ORGANIZED HEALTH CARE EDUCATION/TRAINING PROGRAM

## 2019-05-17 PROCEDURE — 82803 BLOOD GASES ANY COMBINATION: CPT | Performed by: STUDENT IN AN ORGANIZED HEALTH CARE EDUCATION/TRAINING PROGRAM

## 2019-05-17 PROCEDURE — 84100 ASSAY OF PHOSPHORUS: CPT | Performed by: STUDENT IN AN ORGANIZED HEALTH CARE EDUCATION/TRAINING PROGRAM

## 2019-05-17 PROCEDURE — 83930 ASSAY OF BLOOD OSMOLALITY: CPT | Performed by: STUDENT IN AN ORGANIZED HEALTH CARE EDUCATION/TRAINING PROGRAM

## 2019-05-17 PROCEDURE — 25000132 ZZH RX MED GY IP 250 OP 250 PS 637: Performed by: STUDENT IN AN ORGANIZED HEALTH CARE EDUCATION/TRAINING PROGRAM

## 2019-05-17 PROCEDURE — 00000146 ZZHCL STATISTIC GLUCOSE BY METER IP

## 2019-05-17 PROCEDURE — 25500064 ZZH RX 255 OP 636: Performed by: ANESTHESIOLOGY

## 2019-05-17 PROCEDURE — 85027 COMPLETE CBC AUTOMATED: CPT | Performed by: INTERNAL MEDICINE

## 2019-05-17 PROCEDURE — 25000128 H RX IP 250 OP 636: Performed by: STUDENT IN AN ORGANIZED HEALTH CARE EDUCATION/TRAINING PROGRAM

## 2019-05-17 PROCEDURE — 40000275 ZZH STATISTIC RCP TIME EA 10 MIN

## 2019-05-17 PROCEDURE — 80053 COMPREHEN METABOLIC PANEL: CPT | Performed by: STUDENT IN AN ORGANIZED HEALTH CARE EDUCATION/TRAINING PROGRAM

## 2019-05-17 PROCEDURE — 25000125 ZZHC RX 250

## 2019-05-17 PROCEDURE — 70553 MRI BRAIN STEM W/O & W/DYE: CPT

## 2019-05-17 PROCEDURE — 99291 CRITICAL CARE FIRST HOUR: CPT | Mod: GC | Performed by: INTERNAL MEDICINE

## 2019-05-17 PROCEDURE — 85027 COMPLETE CBC AUTOMATED: CPT | Performed by: STUDENT IN AN ORGANIZED HEALTH CARE EDUCATION/TRAINING PROGRAM

## 2019-05-17 PROCEDURE — 25000132 ZZH RX MED GY IP 250 OP 250 PS 637

## 2019-05-17 PROCEDURE — 94640 AIRWAY INHALATION TREATMENT: CPT | Mod: 76

## 2019-05-17 PROCEDURE — 40000986 XR CHEST PORT 1 VW

## 2019-05-17 PROCEDURE — 84145 PROCALCITONIN (PCT): CPT | Performed by: STUDENT IN AN ORGANIZED HEALTH CARE EDUCATION/TRAINING PROGRAM

## 2019-05-17 PROCEDURE — 83935 ASSAY OF URINE OSMOLALITY: CPT | Performed by: STUDENT IN AN ORGANIZED HEALTH CARE EDUCATION/TRAINING PROGRAM

## 2019-05-17 PROCEDURE — 84295 ASSAY OF SERUM SODIUM: CPT | Performed by: STUDENT IN AN ORGANIZED HEALTH CARE EDUCATION/TRAINING PROGRAM

## 2019-05-17 PROCEDURE — 25800030 ZZH RX IP 258 OP 636: Performed by: STUDENT IN AN ORGANIZED HEALTH CARE EDUCATION/TRAINING PROGRAM

## 2019-05-17 PROCEDURE — 25000125 ZZHC RX 250: Performed by: STUDENT IN AN ORGANIZED HEALTH CARE EDUCATION/TRAINING PROGRAM

## 2019-05-17 PROCEDURE — 25000132 ZZH RX MED GY IP 250 OP 250 PS 637: Performed by: ANESTHESIOLOGY

## 2019-05-17 PROCEDURE — 80053 COMPREHEN METABOLIC PANEL: CPT | Performed by: INTERNAL MEDICINE

## 2019-05-17 PROCEDURE — 94003 VENT MGMT INPAT SUBQ DAY: CPT

## 2019-05-17 PROCEDURE — 20000004 ZZH R&B ICU UMMC

## 2019-05-17 PROCEDURE — 25000128 H RX IP 250 OP 636: Performed by: ANESTHESIOLOGY

## 2019-05-17 PROCEDURE — 71045 X-RAY EXAM CHEST 1 VIEW: CPT

## 2019-05-17 PROCEDURE — 82805 BLOOD GASES W/O2 SATURATION: CPT | Performed by: STUDENT IN AN ORGANIZED HEALTH CARE EDUCATION/TRAINING PROGRAM

## 2019-05-17 PROCEDURE — 25800025 ZZH RX 258: Performed by: STUDENT IN AN ORGANIZED HEALTH CARE EDUCATION/TRAINING PROGRAM

## 2019-05-17 PROCEDURE — 40000014 ZZH STATISTIC ARTERIAL MONITORING DAILY

## 2019-05-17 PROCEDURE — 25000128 H RX IP 250 OP 636

## 2019-05-17 PROCEDURE — 25800030 ZZH RX IP 258 OP 636: Performed by: ANESTHESIOLOGY

## 2019-05-17 PROCEDURE — 94640 AIRWAY INHALATION TREATMENT: CPT

## 2019-05-17 PROCEDURE — 85520 HEPARIN ASSAY: CPT | Performed by: STUDENT IN AN ORGANIZED HEALTH CARE EDUCATION/TRAINING PROGRAM

## 2019-05-17 PROCEDURE — A9585 GADOBUTROL INJECTION: HCPCS | Performed by: ANESTHESIOLOGY

## 2019-05-17 RX ORDER — OLANZAPINE 10 MG/1
10 TABLET ORAL 2 TIMES DAILY
Status: DISCONTINUED | OUTPATIENT
Start: 2019-05-17 | End: 2019-05-24

## 2019-05-17 RX ORDER — GADOBUTROL 604.72 MG/ML
7.5 INJECTION INTRAVENOUS ONCE
Status: COMPLETED | OUTPATIENT
Start: 2019-05-17 | End: 2019-05-17

## 2019-05-17 RX ORDER — DEXTROSE MONOHYDRATE 50 MG/ML
INJECTION, SOLUTION INTRAVENOUS CONTINUOUS
Status: DISCONTINUED | OUTPATIENT
Start: 2019-05-17 | End: 2019-05-18

## 2019-05-17 RX ADMIN — DEXTROSE MONOHYDRATE: 100 INJECTION, SOLUTION INTRAVENOUS at 09:08

## 2019-05-17 RX ADMIN — DEXTROSE MONOHYDRATE: 50 INJECTION, SOLUTION INTRAVENOUS at 20:02

## 2019-05-17 RX ADMIN — SENNOSIDES AND DOCUSATE SODIUM 2 TABLET: 8.6; 5 TABLET ORAL at 16:20

## 2019-05-17 RX ADMIN — OLANZAPINE 10 MG: 10 TABLET, FILM COATED ORAL at 19:43

## 2019-05-17 RX ADMIN — MIDAZOLAM 4 MG: 1 INJECTION INTRAMUSCULAR; INTRAVENOUS at 20:10

## 2019-05-17 RX ADMIN — FOLIC ACID 1 MG: 1 TABLET ORAL at 08:14

## 2019-05-17 RX ADMIN — POTASSIUM PHOSPHATE, MONOBASIC 500 MG: 500 TABLET, SOLUBLE ORAL at 19:43

## 2019-05-17 RX ADMIN — FENTANYL CITRATE 100 MCG: 50 INJECTION, SOLUTION INTRAMUSCULAR; INTRAVENOUS at 19:33

## 2019-05-17 RX ADMIN — MIDAZOLAM 12 MG/HR: 5 INJECTION INTRAMUSCULAR; INTRAVENOUS at 07:57

## 2019-05-17 RX ADMIN — FENTANYL CITRATE 100 MCG: 50 INJECTION, SOLUTION INTRAMUSCULAR; INTRAVENOUS at 00:14

## 2019-05-17 RX ADMIN — IPRATROPIUM BROMIDE AND ALBUTEROL SULFATE 3 ML: .5; 3 SOLUTION RESPIRATORY (INHALATION) at 20:05

## 2019-05-17 RX ADMIN — Medication 2 G: at 05:49

## 2019-05-17 RX ADMIN — MIDAZOLAM 4 MG: 1 INJECTION INTRAMUSCULAR; INTRAVENOUS at 08:05

## 2019-05-17 RX ADMIN — OLANZAPINE 10 MG: 10 TABLET, FILM COATED ORAL at 12:54

## 2019-05-17 RX ADMIN — MIDAZOLAM 8 MG/HR: 5 INJECTION INTRAMUSCULAR; INTRAVENOUS at 13:46

## 2019-05-17 RX ADMIN — PANTOPRAZOLE SODIUM 40 MG: 40 TABLET, DELAYED RELEASE ORAL at 08:15

## 2019-05-17 RX ADMIN — MIDAZOLAM 4 MG: 1 INJECTION INTRAMUSCULAR; INTRAVENOUS at 07:05

## 2019-05-17 RX ADMIN — FENTANYL CITRATE 100 MCG: 50 INJECTION, SOLUTION INTRAMUSCULAR; INTRAVENOUS at 08:00

## 2019-05-17 RX ADMIN — CEFTRIAXONE 1 G: 1 INJECTION, POWDER, FOR SOLUTION INTRAMUSCULAR; INTRAVENOUS at 16:17

## 2019-05-17 RX ADMIN — MULTIVITAMIN 15 ML: LIQUID ORAL at 08:14

## 2019-05-17 RX ADMIN — POTASSIUM PHOSPHATE, MONOBASIC 500 MG: 500 TABLET, SOLUBLE ORAL at 11:56

## 2019-05-17 RX ADMIN — MIDAZOLAM 4 MG: 1 INJECTION INTRAMUSCULAR; INTRAVENOUS at 09:05

## 2019-05-17 RX ADMIN — Medication 100 MCG/HR: at 21:32

## 2019-05-17 RX ADMIN — Medication 100 MG: at 08:14

## 2019-05-17 RX ADMIN — IPRATROPIUM BROMIDE AND ALBUTEROL SULFATE 3 ML: .5; 3 SOLUTION RESPIRATORY (INHALATION) at 16:04

## 2019-05-17 RX ADMIN — MIDAZOLAM 4 MG: 1 INJECTION INTRAMUSCULAR; INTRAVENOUS at 16:59

## 2019-05-17 RX ADMIN — MIDAZOLAM 4 MG: 1 INJECTION INTRAMUSCULAR; INTRAVENOUS at 03:37

## 2019-05-17 RX ADMIN — POTASSIUM PHOSPHATE, MONOBASIC AND POTASSIUM PHOSPHATE, DIBASIC 15 MMOL: 224; 236 INJECTION, SOLUTION INTRAVENOUS at 07:34

## 2019-05-17 RX ADMIN — POLYETHYLENE GLYCOL 3350 17 G: 17 POWDER, FOR SOLUTION ORAL at 11:56

## 2019-05-17 RX ADMIN — IPRATROPIUM BROMIDE AND ALBUTEROL SULFATE 3 ML: .5; 3 SOLUTION RESPIRATORY (INHALATION) at 08:34

## 2019-05-17 RX ADMIN — FENTANYL CITRATE 100 MCG: 50 INJECTION, SOLUTION INTRAMUSCULAR; INTRAVENOUS at 06:03

## 2019-05-17 RX ADMIN — FENTANYL CITRATE 100 MCG: 50 INJECTION, SOLUTION INTRAMUSCULAR; INTRAVENOUS at 07:00

## 2019-05-17 RX ADMIN — FENTANYL CITRATE 100 MCG: 50 INJECTION, SOLUTION INTRAMUSCULAR; INTRAVENOUS at 16:59

## 2019-05-17 RX ADMIN — Medication 0.16 MCG/KG/MIN: at 00:00

## 2019-05-17 RX ADMIN — POTASSIUM CHLORIDE 20 MEQ: 1.5 POWDER, FOR SOLUTION ORAL at 17:55

## 2019-05-17 RX ADMIN — DEXTROSE MONOHYDRATE: 50 INJECTION, SOLUTION INTRAVENOUS at 13:36

## 2019-05-17 RX ADMIN — GADOBUTROL 7.5 ML: 604.72 INJECTION INTRAVENOUS at 11:23

## 2019-05-17 RX ADMIN — POTASSIUM CHLORIDE 20 MEQ: 1.5 POWDER, FOR SOLUTION ORAL at 05:48

## 2019-05-17 RX ADMIN — MIDAZOLAM 3 MG: 1 INJECTION INTRAMUSCULAR; INTRAVENOUS at 00:15

## 2019-05-17 RX ADMIN — Medication 100 MCG/HR: at 06:29

## 2019-05-17 RX ADMIN — FENTANYL CITRATE 100 MCG: 50 INJECTION, SOLUTION INTRAMUSCULAR; INTRAVENOUS at 21:29

## 2019-05-17 RX ADMIN — FENTANYL CITRATE 100 MCG: 50 INJECTION, SOLUTION INTRAMUSCULAR; INTRAVENOUS at 09:04

## 2019-05-17 RX ADMIN — POTASSIUM PHOSPHATE, MONOBASIC 500 MG: 500 TABLET, SOLUBLE ORAL at 16:20

## 2019-05-17 RX ADMIN — LEVOTHYROXINE SODIUM 50 MCG: 25 TABLET ORAL at 08:14

## 2019-05-17 RX ADMIN — MIDAZOLAM 4 MG: 1 INJECTION INTRAMUSCULAR; INTRAVENOUS at 06:03

## 2019-05-17 RX ADMIN — SENNOSIDES AND DOCUSATE SODIUM 1 TABLET: 8.6; 5 TABLET ORAL at 05:49

## 2019-05-17 RX ADMIN — HEPARIN SODIUM 1350 UNITS/HR: 10000 INJECTION, SOLUTION INTRAVENOUS at 19:42

## 2019-05-17 ASSESSMENT — ACTIVITIES OF DAILY LIVING (ADL)
ADLS_ACUITY_SCORE: 15
ADLS_ACUITY_SCORE: 17
ADLS_ACUITY_SCORE: 15
ADLS_ACUITY_SCORE: 17

## 2019-05-17 ASSESSMENT — MIFFLIN-ST. JEOR: SCORE: 1226.62

## 2019-05-17 NOTE — PLAN OF CARE
Neuro: Sedated/lethargic. Localizing pain. Pupils equal. Fentanyl/versed drip. Appears to be more alert, not following commands, attempts to get out of bed. Requires prn dose of fentanyl 100 mcg and versed 4 mg.   Cards: Afebrile,  SR. Titrating down on levo.   Resp: CMV settings. Titrating up and down on 02. Thick/tan secretions.   GI/: NPO, OG in place. Napoles in place, adequate urine output. No further stool overnight.   Hem: K, Mg, will need phos replaced. Will need phos replaced when available. Heparin 10a is therapeutic.   Skin: Restraint on left leg to prevent bending of leg/damage to art line cathter. No evidence of learning continue to monitor for skin break down and discharge criteria.   Plan: Monitor for change in condition. Plan for MRI scan of head.     Problem: Adult Inpatient Plan of Care  Goal: Plan of Care Review  5/17/2019 0616 by Tanja Torres RN  Outcome: No Change  Goal: Patient-Specific Goal (Individualization)  5/17/2019 0616 by Tanja Torres RN  Outcome: No Change  Goal: Absence of Hospital-Acquired Illness or Injury  5/17/2019 0616 by Tanja Torres RN  Outcome: No Change  Goal: Optimal Comfort and Wellbeing  5/17/2019 0616 by Tanja Torres RN  Outcome: No Change  5/16/2019 1823 by Deisy Morales, RN  Outcome: Improving  Goal: Readiness for Transition of Care  5/17/2019 0616 by Tanja Torres RN  Outcome: No Change  Goal: Rounds/Family Conference  5/17/2019 0616 by Tanja Torres RN  Outcome: No Change     Problem: Adjustment to Illness (Overdose)  Goal: Optimal Coping  5/17/2019 0616 by Tanja Torres RN  Outcome: No Change    Problem: Arrhythmia/Dysrhythmia (Overdose)  Goal: Stable Heart Rate and Rhythm  5/17/2019 0616 by Tanja Torres RN  Outcome: No Change     Problem: Hemodynamic Instability (Overdose)  Goal: Effective Tissue Perfusion  5/17/2019 0616 by Tanja Torres RN  Outcome: No Change    Problem: Neurologic Impairment (Overdose)  Goal: Optimal Neurologic Function  5/17/2019 0616 by Melissa  KISHA Agrawal  Outcome: No Change    Problem: Respiratory Compromise (Overdose)  Goal: Effective Oxygenation and Ventilation  5/17/2019 0616 by Tanja Torres RN  Outcome: No Change     Problem: Mood Alteration Comorbidity  Goal: Mood Alteration Comorbidity  Description  Patient comorbidity will be monitored for signs and symptoms of Mood Alteration condition.  Problems will be absent, minimized or managed by discharge/transition of care.  5/17/2019 0616 by Tanja Torres RN  Outcome: No Change     Problem: Hypertension Comorbidity  Goal: Blood Pressure in Desired Range  5/17/2019 0616 by Tanja Torres RN  Outcome: No Change    Problem: Restraint for Non-Violent/Non-Self-Destructive Behavior  Goal: Prevent/Manage Potential Problems  Description  Maintain safety of patient and others during period of restraint.  Promote psychological and physical wellbeing.  Prevent injury to skin and involved body parts.  Promote nutrition, hydration, and elimination.  5/17/2019 0616 by Tanja Torres RN  Outcome: No Change

## 2019-05-17 NOTE — PROGRESS NOTES
5/17/2019:    Social Work Services Progress Note    Hospital Day: 7  Date of Initial Social Work Evaluation:  5/13/19  Collaborated with:  Patient's mother Radha at bedside    Data:  Ana Laura Wharton is a 48 year old female patient admitted to [facility] on 5/11/19 due to intentional overdose.    Intervention:    Met with patient's mother regarding [facility] decision and patient's progress. She has been at bedside daily and was anxiously awaiting the results of today's head MRI.  Discussed a Care Conference, which all family members would appreciate as there are strained relationships amongst all parties and it would be appropriate for everyone to be updated simultaneously. Discussed for possibly Tuesday 5/21.     Provided contact information to patient's mother for SW follow up. Also discussed possible future outcomes if/when the patient recovers concerning her severe mental health issues and suicide attempts. Additional psych follow up would be needed and mother was encouraged to continue to work with Social Work to coordinate resources.    ADDENDUM; Additionally discussed [facility] policy and the urgent need for the patient to complete a health care directive as soon as she is capable, to identify who she would like as her health care agent.    Assessment:  Patient remains in critical condition, awaiting results of head MRI. To follow up for possible care conference Tuesday 5/21.     Plan:    Anticipated Disposition:  TBD- critically ill    Barriers to d/c plan:  Medical improvement, rehab assessments, discharge planning    Follow Up:  SW following.      YAHAIRA Garcia, [facility]  ICU 4A & 4C   Ph: [phone]  Pager: [phone]

## 2019-05-17 NOTE — PLAN OF CARE
Pt continues on vent with PEEP 8, Fio2 50%, Flolan off.  7.42/56/68/36.   Not following commands, but BARBOUR, restless trying to wiggle OOB even on 5mg Versed and 100mcg fentanyl. Resumed home Zyprexa BID and pt was able to rest this afternoon after MRI head - wnl.   Levo weaned off and MAPs continue >60. SR 90s. Afebrile. On Ceftriaxone. Little to no edema. I=O today without any diuresis and D5W MIVF at 150/hr to correct hypernatremia. Also 100mL/hr H20 FWF.  Low UO for 2hrs but normalized without intervention.   TF resumed after MRI at 35mL/hr. Will advance to goal tonight. Lytes replaced this AM. No BM today, Hypoactive BS, Gave Miralax and Senna PRNs.   Heparin gtt continues for DVT.   Mother and jay Szymanski saw pt today, updated both. Linden updated via phone.     Continue MICU POC, closely following Na. Wean vent and sedation tomorrow.         Problem: Respiratory Compromise (Overdose)  Goal: Effective Oxygenation and Ventilation  5/17/2019 0616 by Tanja Torres, RN  Outcome: No Change     Problem: Hemodynamic Instability (Overdose)  Goal: Effective Tissue Perfusion  5/17/2019 1708 by Miranda Silverman, RN  Outcome: Adequate for Discharge  5/17/2019 0616 by Tanja Torres, RN  Outcome: No Change

## 2019-05-17 NOTE — PLAN OF CARE
Pt continues to require her Femoral Art line for frequent BPs, pressor titration, and ABGs. Soft L foot restraint continues to keep Left leg from bending and inhibiting measurement or damaging catheter. New order placed by Dr Arnett and jay Szymanski aware and updated. Will remove restraint as soon as pt able to keep leg straight or fem line removed. Assessment continues every 2hrs.       Problem: Restraint for Non-Violent/Non-Self-Destructive Behavior  Goal: Prevent/Manage Potential Problems  Description  Maintain safety of patient and others during period of restraint.  Promote psychological and physical wellbeing.  Prevent injury to skin and involved body parts.  Promote nutrition, hydration, and elimination.  5/17/2019 1350 by Miranda Silverman, RN  Outcome: No Change  5/17/2019 0616 by Tanja Torres, RN  Outcome: No Change

## 2019-05-18 ENCOUNTER — APPOINTMENT (OUTPATIENT)
Dept: CT IMAGING | Facility: CLINIC | Age: 49
DRG: 987 | End: 2019-05-18
Attending: ANESTHESIOLOGY
Payer: COMMERCIAL

## 2019-05-18 LAB
ALBUMIN SERPL-MCNC: 2.2 G/DL (ref 3.4–5)
ALBUMIN SERPL-MCNC: 2.2 G/DL (ref 3.4–5)
ALP SERPL-CCNC: 204 U/L (ref 40–150)
ALP SERPL-CCNC: 226 U/L (ref 40–150)
ALT SERPL W P-5'-P-CCNC: 21 U/L (ref 0–50)
ALT SERPL W P-5'-P-CCNC: 21 U/L (ref 0–50)
ANION GAP SERPL CALCULATED.3IONS-SCNC: 2 MMOL/L (ref 3–14)
ANION GAP SERPL CALCULATED.3IONS-SCNC: 4 MMOL/L (ref 3–14)
ANION GAP SERPL CALCULATED.3IONS-SCNC: 5 MMOL/L (ref 3–14)
AST SERPL W P-5'-P-CCNC: 40 U/L (ref 0–45)
AST SERPL W P-5'-P-CCNC: 42 U/L (ref 0–45)
BACTERIA SPEC CULT: NO GROWTH
BASE EXCESS BLDA CALC-SCNC: 9.7 MMOL/L
BILIRUB SERPL-MCNC: 1.8 MG/DL (ref 0.2–1.3)
BILIRUB SERPL-MCNC: 2 MG/DL (ref 0.2–1.3)
BUN SERPL-MCNC: 10 MG/DL (ref 7–30)
CALCIUM SERPL-MCNC: 7.8 MG/DL (ref 8.5–10.1)
CALCIUM SERPL-MCNC: 8.1 MG/DL (ref 8.5–10.1)
CALCIUM SERPL-MCNC: 8.2 MG/DL (ref 8.5–10.1)
CHLORIDE SERPL-SCNC: 105 MMOL/L (ref 94–109)
CHLORIDE SERPL-SCNC: 107 MMOL/L (ref 94–109)
CHLORIDE SERPL-SCNC: 111 MMOL/L (ref 94–109)
CO2 SERPL-SCNC: 31 MMOL/L (ref 20–32)
CO2 SERPL-SCNC: 33 MMOL/L (ref 20–32)
CO2 SERPL-SCNC: 33 MMOL/L (ref 20–32)
CREAT SERPL-MCNC: 0.59 MG/DL (ref 0.52–1.04)
CREAT SERPL-MCNC: 0.61 MG/DL (ref 0.52–1.04)
CREAT SERPL-MCNC: 0.68 MG/DL (ref 0.52–1.04)
ERYTHROCYTE [DISTWIDTH] IN BLOOD BY AUTOMATED COUNT: 15.6 % (ref 10–15)
ERYTHROCYTE [DISTWIDTH] IN BLOOD BY AUTOMATED COUNT: 16 % (ref 10–15)
GFR SERPL CREATININE-BSD FRML MDRD: >90 ML/MIN/{1.73_M2}
GLUCOSE BLDC GLUCOMTR-MCNC: 114 MG/DL (ref 70–99)
GLUCOSE BLDC GLUCOMTR-MCNC: 123 MG/DL (ref 70–99)
GLUCOSE BLDC GLUCOMTR-MCNC: 143 MG/DL (ref 70–99)
GLUCOSE SERPL-MCNC: 102 MG/DL (ref 70–99)
GLUCOSE SERPL-MCNC: 117 MG/DL (ref 70–99)
GLUCOSE SERPL-MCNC: 127 MG/DL (ref 70–99)
HCO3 BLD-SCNC: 34 MMOL/L (ref 21–28)
HCT VFR BLD AUTO: 22.4 % (ref 35–47)
HCT VFR BLD AUTO: 22.7 % (ref 35–47)
HGB BLD-MCNC: 7.1 G/DL (ref 11.7–15.7)
HGB BLD-MCNC: 7.3 G/DL (ref 11.7–15.7)
LMWH PPP CHRO-ACNC: 0.31 IU/ML
Lab: NORMAL
MAGNESIUM SERPL-MCNC: 1.8 MG/DL (ref 1.6–2.3)
MAGNESIUM SERPL-MCNC: 2 MG/DL (ref 1.6–2.3)
MCH RBC QN AUTO: 29.7 PG (ref 26.5–33)
MCH RBC QN AUTO: 29.9 PG (ref 26.5–33)
MCHC RBC AUTO-ENTMCNC: 31.7 G/DL (ref 31.5–36.5)
MCHC RBC AUTO-ENTMCNC: 32.2 G/DL (ref 31.5–36.5)
MCV RBC AUTO: 93 FL (ref 78–100)
MCV RBC AUTO: 94 FL (ref 78–100)
O2/TOTAL GAS SETTING VFR VENT: 70 %
PCO2 BLD: 49 MM HG (ref 35–45)
PH BLD: 7.46 PH (ref 7.35–7.45)
PHOSPHATE SERPL-MCNC: 2.8 MG/DL (ref 2.5–4.5)
PLATELET # BLD AUTO: 178 10E9/L (ref 150–450)
PLATELET # BLD AUTO: 188 10E9/L (ref 150–450)
PO2 BLD: 68 MM HG (ref 80–105)
POTASSIUM SERPL-SCNC: 3.6 MMOL/L (ref 3.4–5.3)
POTASSIUM SERPL-SCNC: 4.1 MMOL/L (ref 3.4–5.3)
POTASSIUM SERPL-SCNC: 4.2 MMOL/L (ref 3.4–5.3)
PROT SERPL-MCNC: 5.5 G/DL (ref 6.8–8.8)
PROT SERPL-MCNC: 5.7 G/DL (ref 6.8–8.8)
RBC # BLD AUTO: 2.39 10E12/L (ref 3.8–5.2)
RBC # BLD AUTO: 2.44 10E12/L (ref 3.8–5.2)
SODIUM SERPL-SCNC: 143 MMOL/L (ref 133–144)
SODIUM SERPL-SCNC: 143 MMOL/L (ref 133–144)
SODIUM SERPL-SCNC: 144 MMOL/L (ref 133–144)
SPECIMEN SOURCE: NORMAL
WBC # BLD AUTO: 8.3 10E9/L (ref 4–11)
WBC # BLD AUTO: 8.7 10E9/L (ref 4–11)

## 2019-05-18 PROCEDURE — 25000125 ZZHC RX 250: Performed by: STUDENT IN AN ORGANIZED HEALTH CARE EDUCATION/TRAINING PROGRAM

## 2019-05-18 PROCEDURE — 25000128 H RX IP 250 OP 636: Performed by: STUDENT IN AN ORGANIZED HEALTH CARE EDUCATION/TRAINING PROGRAM

## 2019-05-18 PROCEDURE — 25000128 H RX IP 250 OP 636: Performed by: ANESTHESIOLOGY

## 2019-05-18 PROCEDURE — 94640 AIRWAY INHALATION TREATMENT: CPT | Mod: 76

## 2019-05-18 PROCEDURE — 80048 BASIC METABOLIC PNL TOTAL CA: CPT | Performed by: STUDENT IN AN ORGANIZED HEALTH CARE EDUCATION/TRAINING PROGRAM

## 2019-05-18 PROCEDURE — 94640 AIRWAY INHALATION TREATMENT: CPT

## 2019-05-18 PROCEDURE — 94003 VENT MGMT INPAT SUBQ DAY: CPT

## 2019-05-18 PROCEDURE — 25800030 ZZH RX IP 258 OP 636

## 2019-05-18 PROCEDURE — 80053 COMPREHEN METABOLIC PANEL: CPT | Performed by: STUDENT IN AN ORGANIZED HEALTH CARE EDUCATION/TRAINING PROGRAM

## 2019-05-18 PROCEDURE — 85520 HEPARIN ASSAY: CPT | Performed by: STUDENT IN AN ORGANIZED HEALTH CARE EDUCATION/TRAINING PROGRAM

## 2019-05-18 PROCEDURE — 40000275 ZZH STATISTIC RCP TIME EA 10 MIN

## 2019-05-18 PROCEDURE — 82803 BLOOD GASES ANY COMBINATION: CPT

## 2019-05-18 PROCEDURE — 84145 PROCALCITONIN (PCT): CPT | Performed by: STUDENT IN AN ORGANIZED HEALTH CARE EDUCATION/TRAINING PROGRAM

## 2019-05-18 PROCEDURE — 25000132 ZZH RX MED GY IP 250 OP 250 PS 637: Performed by: ANESTHESIOLOGY

## 2019-05-18 PROCEDURE — 83735 ASSAY OF MAGNESIUM: CPT | Performed by: STUDENT IN AN ORGANIZED HEALTH CARE EDUCATION/TRAINING PROGRAM

## 2019-05-18 PROCEDURE — 25800030 ZZH RX IP 258 OP 636: Performed by: STUDENT IN AN ORGANIZED HEALTH CARE EDUCATION/TRAINING PROGRAM

## 2019-05-18 PROCEDURE — 71250 CT THORAX DX C-: CPT

## 2019-05-18 PROCEDURE — 84295 ASSAY OF SERUM SODIUM: CPT | Performed by: STUDENT IN AN ORGANIZED HEALTH CARE EDUCATION/TRAINING PROGRAM

## 2019-05-18 PROCEDURE — 25000132 ZZH RX MED GY IP 250 OP 250 PS 637

## 2019-05-18 PROCEDURE — 25000128 H RX IP 250 OP 636

## 2019-05-18 PROCEDURE — 85027 COMPLETE CBC AUTOMATED: CPT | Performed by: INTERNAL MEDICINE

## 2019-05-18 PROCEDURE — 85027 COMPLETE CBC AUTOMATED: CPT | Performed by: STUDENT IN AN ORGANIZED HEALTH CARE EDUCATION/TRAINING PROGRAM

## 2019-05-18 PROCEDURE — 20000004 ZZH R&B ICU UMMC

## 2019-05-18 PROCEDURE — 25000132 ZZH RX MED GY IP 250 OP 250 PS 637: Performed by: STUDENT IN AN ORGANIZED HEALTH CARE EDUCATION/TRAINING PROGRAM

## 2019-05-18 PROCEDURE — 40000014 ZZH STATISTIC ARTERIAL MONITORING DAILY

## 2019-05-18 PROCEDURE — 00000146 ZZHCL STATISTIC GLUCOSE BY METER IP

## 2019-05-18 PROCEDURE — 99291 CRITICAL CARE FIRST HOUR: CPT | Mod: GC | Performed by: INTERNAL MEDICINE

## 2019-05-18 PROCEDURE — 40000281 ZZH STATISTIC TRANSPORT TIME EA 15 MIN

## 2019-05-18 PROCEDURE — 27210429 ZZH NUTRITION PRODUCT INTERMEDIATE LITER

## 2019-05-18 PROCEDURE — 80053 COMPREHEN METABOLIC PANEL: CPT | Performed by: INTERNAL MEDICINE

## 2019-05-18 PROCEDURE — 84100 ASSAY OF PHOSPHORUS: CPT | Performed by: STUDENT IN AN ORGANIZED HEALTH CARE EDUCATION/TRAINING PROGRAM

## 2019-05-18 RX ORDER — FUROSEMIDE 10 MG/ML
40 INJECTION INTRAMUSCULAR; INTRAVENOUS ONCE
Status: COMPLETED | OUTPATIENT
Start: 2019-05-18 | End: 2019-05-18

## 2019-05-18 RX ORDER — DEXMEDETOMIDINE HYDROCHLORIDE 4 UG/ML
.2-1.2 INJECTION, SOLUTION INTRAVENOUS CONTINUOUS
Status: DISCONTINUED | OUTPATIENT
Start: 2019-05-18 | End: 2019-05-22

## 2019-05-18 RX ADMIN — DEXMEDETOMIDINE 0.2 MCG/KG/HR: 100 INJECTION, SOLUTION, CONCENTRATE INTRAVENOUS at 21:23

## 2019-05-18 RX ADMIN — MIDAZOLAM 4 MG: 1 INJECTION INTRAMUSCULAR; INTRAVENOUS at 02:35

## 2019-05-18 RX ADMIN — MULTIVITAMIN 15 ML: LIQUID ORAL at 07:57

## 2019-05-18 RX ADMIN — IPRATROPIUM BROMIDE AND ALBUTEROL SULFATE 3 ML: .5; 3 SOLUTION RESPIRATORY (INHALATION) at 15:47

## 2019-05-18 RX ADMIN — POTASSIUM CHLORIDE 20 MEQ: 1.5 POWDER, FOR SOLUTION ORAL at 05:27

## 2019-05-18 RX ADMIN — FENTANYL CITRATE 75 MCG: 50 INJECTION, SOLUTION INTRAMUSCULAR; INTRAVENOUS at 20:50

## 2019-05-18 RX ADMIN — MIDAZOLAM 4 MG: 1 INJECTION INTRAMUSCULAR; INTRAVENOUS at 04:21

## 2019-05-18 RX ADMIN — OLANZAPINE 10 MG: 10 TABLET, FILM COATED ORAL at 19:52

## 2019-05-18 RX ADMIN — HEPARIN SODIUM 1350 UNITS/HR: 10000 INJECTION, SOLUTION INTRAVENOUS at 14:30

## 2019-05-18 RX ADMIN — MIDAZOLAM 10 MG/HR: 5 INJECTION INTRAMUSCULAR; INTRAVENOUS at 13:34

## 2019-05-18 RX ADMIN — FUROSEMIDE 40 MG: 10 INJECTION, SOLUTION INTRAVENOUS at 01:55

## 2019-05-18 RX ADMIN — POTASSIUM PHOSPHATE, MONOBASIC 500 MG: 500 TABLET, SOLUBLE ORAL at 19:52

## 2019-05-18 RX ADMIN — Medication 2 G: at 05:27

## 2019-05-18 RX ADMIN — FENTANYL CITRATE 100 MCG: 50 INJECTION, SOLUTION INTRAMUSCULAR; INTRAVENOUS at 04:21

## 2019-05-18 RX ADMIN — MIDAZOLAM 10 MG/HR: 5 INJECTION INTRAMUSCULAR; INTRAVENOUS at 23:15

## 2019-05-18 RX ADMIN — IPRATROPIUM BROMIDE AND ALBUTEROL SULFATE 3 ML: .5; 3 SOLUTION RESPIRATORY (INHALATION) at 11:44

## 2019-05-18 RX ADMIN — MIDAZOLAM 4 MG: 1 INJECTION INTRAMUSCULAR; INTRAVENOUS at 07:43

## 2019-05-18 RX ADMIN — MIDAZOLAM 4 MG: 1 INJECTION INTRAMUSCULAR; INTRAVENOUS at 08:47

## 2019-05-18 RX ADMIN — FOLIC ACID 1 MG: 1 TABLET ORAL at 07:57

## 2019-05-18 RX ADMIN — POTASSIUM PHOSPHATE, MONOBASIC 500 MG: 500 TABLET, SOLUBLE ORAL at 07:58

## 2019-05-18 RX ADMIN — IPRATROPIUM BROMIDE AND ALBUTEROL SULFATE 3 ML: .5; 3 SOLUTION RESPIRATORY (INHALATION) at 19:23

## 2019-05-18 RX ADMIN — PANTOPRAZOLE SODIUM 40 MG: 40 TABLET, DELAYED RELEASE ORAL at 07:59

## 2019-05-18 RX ADMIN — FENTANYL CITRATE 100 MCG: 50 INJECTION, SOLUTION INTRAMUSCULAR; INTRAVENOUS at 07:42

## 2019-05-18 RX ADMIN — Medication 150 MCG/HR: at 14:30

## 2019-05-18 RX ADMIN — ACETAMINOPHEN 975 MG: 325 TABLET, FILM COATED ORAL at 03:55

## 2019-05-18 RX ADMIN — MIDAZOLAM 8 MG/HR: 5 INJECTION INTRAMUSCULAR; INTRAVENOUS at 03:31

## 2019-05-18 RX ADMIN — MIDAZOLAM 4 MG: 1 INJECTION INTRAMUSCULAR; INTRAVENOUS at 19:41

## 2019-05-18 RX ADMIN — POTASSIUM PHOSPHATE, MONOBASIC 500 MG: 500 TABLET, SOLUBLE ORAL at 12:19

## 2019-05-18 RX ADMIN — OLANZAPINE 10 MG: 10 TABLET, FILM COATED ORAL at 07:58

## 2019-05-18 RX ADMIN — FENTANYL CITRATE 100 MCG: 50 INJECTION, SOLUTION INTRAMUSCULAR; INTRAVENOUS at 01:53

## 2019-05-18 RX ADMIN — POTASSIUM PHOSPHATE, MONOBASIC 500 MG: 500 TABLET, SOLUBLE ORAL at 16:03

## 2019-05-18 RX ADMIN — Medication 100 MG: at 07:57

## 2019-05-18 RX ADMIN — FENTANYL CITRATE 100 MCG: 50 INJECTION, SOLUTION INTRAMUSCULAR; INTRAVENOUS at 11:57

## 2019-05-18 RX ADMIN — MIDAZOLAM 4 MG: 1 INJECTION INTRAMUSCULAR; INTRAVENOUS at 12:04

## 2019-05-18 RX ADMIN — IPRATROPIUM BROMIDE AND ALBUTEROL SULFATE 3 ML: .5; 3 SOLUTION RESPIRATORY (INHALATION) at 07:44

## 2019-05-18 RX ADMIN — LEVOTHYROXINE SODIUM 50 MCG: 25 TABLET ORAL at 07:57

## 2019-05-18 RX ADMIN — MIDAZOLAM 2 MG: 1 INJECTION INTRAMUSCULAR; INTRAVENOUS at 20:45

## 2019-05-18 RX ADMIN — Medication 150 MCG/HR: at 08:46

## 2019-05-18 ASSESSMENT — ACTIVITIES OF DAILY LIVING (ADL)
ADLS_ACUITY_SCORE: 17
ADLS_ACUITY_SCORE: 18
ADLS_ACUITY_SCORE: 20

## 2019-05-18 ASSESSMENT — MIFFLIN-ST. JEOR: SCORE: 1254.62

## 2019-05-18 NOTE — PROGRESS NOTES
The MRI of the brain does not reveal definitive evidence of anoxic brain injury. This is reassuring. No further testing recommended. The patient should be followed clinically. The patient is expected to improve neurologically as her medical problems get better. Please contact the Neuro ICU service at [phone] with questions or concerns. We will now sign off.

## 2019-05-18 NOTE — PLAN OF CARE
Pt continues to require soft restraints on left ankle to keep leg straight with femoral art line. Pt is non compliant and not redirectible. Will continue to monitor and evaluate for ability to discontinue restraints.

## 2019-05-18 NOTE — PLAN OF CARE
D: Pt admitted for purposeful beta blocker overdose.  I/A: Arouses to voice, does not follow commands, Able to move all extremities spontaneously. Sedated with fent and versed. PRN fent and versed given Q1-2H for frequent bouts of agitation. Tmax 100.1. MD notified. NSR to ST 90-100s. MAP goal >60, pressures soft but above goal without pressers. FiO2 needs initially increased from 50 to 70% in beginning of shift, LS initially coarse, one dose lasix given with 2L output. Now back to requiring 50% FiO2. Other vent settings unchanged. TF advanced to goal. Residuals <160. Free water flush maintained. Good UO, 2L negative today so far. D5W off at midnight. K and mag replaced this AM.  P: Continue to wean from vent and sedation as able to work towards extubation. Consider weaning PEEP today if pt tolerates. Monitor UO now that IVF is stopped. Continue plan of care.  Sandrine Sandoval RN on 5/18/2019 at 6:21 AM

## 2019-05-18 NOTE — PROGRESS NOTES
MICU Progress Note  Ana Laura Wharton MRN: [id number]  Age: 48 year old, : 1970  Date of Admission:2019  Primary care provider: Liborio Lincoln            Assessment and Plan  Ana Laura Wharton is a 48 year old female with a PMHx significant for recurrent UTIs, HTN, bipolar I, and anxiety/depression who was transferred from [facility] ED for medication overdose as a suicide attempt.  Now intubated, sedated and on pressors due to vasoplegia 2/2 clonidine and coreg overdose.  Improving slowly, mental status recovery remains unclear.    Neurologic  # Concern for Anoxic Brain Injury: Exam initially concerning for loss of cortical inhibition with possible posturing and clonus.  Also had dilated non-reactive pupils, but this has improved with weaning of sedation.  EEG without seizure activity.  MRI without abnormalities.Starting to have purposeful movements, low suspicion for anoxic brain injury.     Sedation: Weaning versed and fentanyl as tolerated, will consider change to precedex    #Suicide attempt  #Bipolar I, anxiety, depression  - Psych consult when medically stable   - Holding PTA citalopram 20mg every day, mirtazapine 60mg QPM,   - Sitter   -     #EtOH Abuse: Heavy drinking recently reports  - Consider benzo for any seizure activity/agitation  - IV thiamine and folate    Cardiovascular  #Clonidine/carvedilol overdose:   #Hypotension, lactic acidosis:  Patient reported taking 180 tabs total of both Coreg 6.25mg and clonidine 0.1mg on t 7pm; d/w poison control; s/p 10mg Narcan x2 and insulin gtt, intralipid, norepi, epi, phenyl, vasopressin, dopamine, and angiotensin 2.  Able to decreased Epi significantly after starting AT2.  Overnight  received methylene blue and additional fluids.  Lactate normalized.   weaned off AT2 and down to Epi and Vaso.  Insulin gtt stopped overnight . Continues to require pressors. Echocardiograms during admission have shown  normal EF.   - Wean pressors as able    #Prolonged QTc  Patient presented w/ QTc of 507, has now decreased to 468    Respiratory  #Acute hypoxic/hypercapnic respiratory failure requiring intubation:  #Concern for ARDS: Has bilateral infiltrates but volume overload may present alterative cause or exacerbating factor.  S/p flolan stopped on 05/16/19  - Working on decreasing vent support, PEEP to 8, goal 5 by 5/17  - Diuresis as below     Gastrointestinal  #Elevated AST:  #Elevated ALT: Suspect related to alcohol use, lower than would expect from shock.  Improving.    - CTM     #Elevated bilirubin: Direct predominant.  Suspect cholestasis.   - CTM    # Nutrition:   - NPO except meds, ice chips, water for now   - Nutrition to start TF  - Speech eval when off sedation     #Ileus/constipation: Resolved  CT abd/pelvis w/ con showed large stool burden, ileus but could not r/o SBO, and a bilobed low-density lesion along the gastric fundus (unchanged from 7/2018), duplication cyst vs neoplasm.  PO gastrografin 5/13 without significant result.  Continued bowel regimen.     #Pancreatitis: Resolved   #Hx of alcohol induced pancreatitis   Lipase 1,100; per chart review patient was c/o abdominal pain prior to intubation, CT abd/pelvis w/ con showed no pancreatitis.  Per friend was drinking heavily prior to admission.  Lipase normalized on 5/14 recheck.    Infectious Disease   # Concern for aspiration/CAP:   # Elevated procalcitonin   UA not convincing for infection; CXR w/o infiltrate, procal wnl, but has now increased.  Afebrile, but leukocytosis continues.  Could be sign of infection or anoxic brain injury and spilling from central release.  But downtrend is reassuring for resolving infection.   Do note single elevated temp 5/15.    - See cultures below  - CTX for now CAP coverage  - Low threshold to broaden for hospital acquired infections    Antibiotics:  Cefepime 5/11-12  Flagyl 5/11-12  Ceftriaxone 5/13-17 pending clinical  course    Cultures:  MRSA nares 5/12 negative   Blood cultures: 5/11 NGTD, 5/15 NGTD  UC 5/11 >100k colonies coag - staph (susceptabilities pending)  UC  5/12 NGTD  Sputum 5/15: mixed mina      Hematology  #Leukocytosis: Reactive vs infectious.  Improving  - See ID section  - CTM    #Right internal jugular thrombus: suspected line associated 2/2 attempted line placement at OSH.  Interval increase on repeat US 5/14.    - Heparin drip, high intensity    #Acute Normocytic Anemia:  Suspect initial drop was dilutional.  However interval drop 5/15-16 unclear cause.  No overt bleeding at this time.  Direct predominant hyperbilirubinemia.  Low suspicion for hemolysis.  Possibly still related to fluid shifts or lab fluctuation.  Will watch closely.    - Transfuse for hgb <7  - CTM    # Thrombocytopenia: Occurred in setting of massive fluid resuscitation.  Suspect largely dilutional, correlates with hgb.  Could be component of marrow suppression as well.  HIT score was low.  Platelets stable  - CTM    Endocrine  #Glucose  - Still requiring D50  - CTM    #Hypothyroidism   - Cont Synthroid     Renal/Electolytes/FEN  # Oliguria and EDUARDO:  Improving  # Volume overload: BUN and creatinine mildly elevated on presentation.  FENa was 0.1%.  Consistent with prerenal picture.  Diuresis going well.     - Strict I/O  - Nap[place] in  - Continuing diuresis, lasix, goal net negative 1-2L    # Hypernatremia  Due to over-diuresis and salt load from tube feeds.   - Aggressively replacing water    #Overactive bladder:  - Cont oxybutynin, hold mirabegron     #Frequent UTIs:  - Holding cipro 125mg every day for UTI ppx given prolonged QT   - Currently on CTX as above    ACID/BASE/ELECTROLYTES:   # Metabolic alkalosis:  - Consider acetazolamide for bicarb diuresis if not improving    # Electrolytes:  - On protocol     VOLUME STATUS: Remains hypervolemic, diuresis as above    Skin Care  #Pressure injury coccyx  - Turns per nursing    PPX:  DVT: Heparin  "as above. GI: PPI  FEN: NPO, consider TF if to remain intubated and ileus resolving   Consults: Neuro ICU, SW, Pharmacy  Tubes/Lines/Drains: Reviewed    CODE: FULL  Family: Ex  Gabriel was listed as emergency contact: [phone], boyfriend Nessa has been present at bedside [phone].  Mother is reportedly? estranged (per SO), but would like to be decision maker [phone].  Has 16 year old son who she has not spoken with and Brother Linden [phone]    Per SW consult.  Brother Linden will be decision maker for now, however mother has expressed desire to assume this role.  Complex psycho social back drop.  Mother has been grossly appropriate with staff thus far.  Appreciate  assistance in this matter.     Dispo: ICU pending improved neuro status, pressor and vent weaning.       Patient discussed with staff attending, Dr. Alcantar.  Please feel free to page with questions.    Nick Arnett MD PhD    Internal Medicine PGY-3  Pager: [phone]         Interval History   No overnight events. Urinary output decreased after holding diuretics. No other issues.        Vitals  Vital signs:  Temp: 100.1  F (37.8  C) Temp src: Axillary BP: 96/57   Heart Rate: 94 Resp: 18 SpO2: 94 % O2 Device: Mechanical Ventilator   Height: 165.7 cm (5' 5.24\") Weight: 62 kg (136 lb 11 oz)  Estimated body mass index is 22.58 kg/m  as calculated from the following:    Height as of this encounter: 1.657 m (5' 5.24\").    Weight as of this encounter: 62 kg (136 lb 11 oz).        Ventilator  Ventilation Mode: CMV/AC  (Continuous Mandatory Ventilation/ Assist Control)  FiO2 (%): 50 %  Rate Set (breaths/minute): 26 breaths/min  Tidal Volume Set (mL): (S) 340 mL  PEEP (cm H2O): (S) 14 cmH2O  Resp: 26       Intake/Output      Intake/Output Summary (Last 24 hours) at 5/18/2019 0714  Last data filed at 5/18/2019 0700  Gross per 24 hour   Intake 6786.09 ml   Output 4235 ml   Net 2551.09 ml               Physical Exam    Gen: Intubated, slowly responding " to commands   HEENT:AT/ NC, PERRL,  sclera anicteric  PULM/THORAX:Coarse breath sounds anteriorly  CV:RRR, S1 and S2 appreciated, no extra heart sounds, murmurs or rub auscultated  ABD: Distended and tympanic, no HSM appreciated.  EXT: Warm,  Edema present in hands and feet  SKIN: Capillary refill normal  NEURO: Responsive, moving all four ext.  PERRL.  1-2 beats clonus    Results for orders placed or performed during the hospital encounter of 05/11/19 (from the past 24 hour(s))   Sodium   Result Value Ref Range    Sodium 153 (H) 133 - 144 mmol/L   Osmolality   Result Value Ref Range    Osmolality 315 (H) 275 - 295 mmol/kg   Osmolality urine   Result Value Ref Range    Urine Osmolality 417 100 - 1,200 mmol/kg   XR Chest Port 1 View   Result Value Ref Range    Radiologist flags Endotracheal tube (Urgent)     Narrative    Chest one view portable spine    HISTORY: Improving respiratory status    Present study: 5/15/2019.    FINDINGS: Cardiac silhouette is nonenlarged. Left IJ central line tip  in the mid SVC. Endotracheal tube is 9.7 cm above the coleman. Enteric  tube tip in the stomach. Diffuse interstitial and hazy opacities  bilaterally. Surgical wedge resection changes in the left apex.      Impression    IMPRESSION: Endotracheal tube is 9.7 cm above the coleman. Recommend  slight advancement.    [Access Center: Endotracheal tube]    This report will be copied to the [facility] to ensure a  provider acknowledges the finding. Access Center is available Monday  through Friday 8am-3:30 pm.     DAVID VANCE MD   MR Brain w/o & w Contrast    Narrative    MRI brain without and with contrast  MRA of the head without contrast  Neck MRA without and with contrast    Provided History:  Altered level of consciousness (LOC), unexplained;  concern for anoxic brain injury, neuro recs MRI/MRA.  Additional history from EMR: clonidine and coreg overdose    Comparison:  5/13/2019      Technique:   Brain MRI:  Axial  diffusion, FLAIR, T2-weighted, susceptibility, and  coronal T1-weighted images were obtained without intravenous contrast.  Following intravenous gadolinium-based contrast administration, axial  and coronal T1-weighted images were obtained.    Head MRA: 3D time-of-flight MRA of the [place] of [place] was performed  without intravenous contrast.  Neck MRA:  Limited non contrast 2DTOF images were obtained of the  mid-cervical region. Following intravenous gadolinium-based contrast  administration, a contrast enhanced MRA of the neck/cervical vessels  was performed.  Three-dimensional reconstructions of the neck and head MRA were  created, which were reviewed by the radiologist.    Dose: 7.5cc of Gadavist injected.     Findings:   Brain MRI: Axial diffusion weighted images demonstrate no definite  acute infarct. On the FLAIR images, there is nonspecific mild  periventricular T2-hyperintensity, which are most likely related to  chronic small vessel ischemic disease. There is no definite  intracranial hemorrhage on susceptibility images. Ventricles are  proportionate to the cerebral sulci. Contrast-enhanced images of the  brain demonstrate no abnormal intra-enhancement. There is mild  leptomeningeal enhancement noted. Small foci of hypointense signal on  susceptibility weighted imaging in the posterior occipital lobes and  the corpus callosum. Mild mucosal thickening of the sphenoid, ethmoid  sinuses. Remaining paranasal sinuses are clear. Small volume of fluid  in the mastoid air cells.    Head MRA demonstrates no definite aneurysm or stenosis of the major  intracranial arteries.    Neck MRA demonstrates patent major cervical arteries. The normal  distal right internal carotid artery measures 5 mm. The normal distal  left internal carotid artery measures 5 mm. Antegrade flow in the  major cervical vasculature.      Impression    Impression:  1. No evidence of acute infarction or intracranial hemorrhage.  2. Mild  leptomeningeal enhancement, may represent early meningeal  inflammation versus evidence of IV sedation. Recommend clinical  correlation. Prior  3. 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  4. Head MRA demonstrates no definite aneurysm or stenosis of the major  intracranial arteries.  5. Neck MRA demonstrates patent major cervical arteries.    I have personally reviewed the examination and initial interpretation  and I agree with the findings.    IRIS JORGENSEN MD   Sodium   Result Value Ref Range    Sodium 149 (H) 133 - 144 mmol/L   Heparin Xa (10a) Level   Result Value Ref Range    Heparin 10A Level 0.31 IU/mL   Blood gas arterial with oxyhemoglobin (AM Draw)   Result Value Ref Range    pH Arterial 7.39 7.35 - 7.45 pH    pCO2 Arterial 60 (H) 35 - 45 mm Hg    pO2 Arterial 73 (L) 80 - 105 mm Hg    Bicarbonate Arterial 36 (H) 21 - 28 mmol/L    FIO2 60.0     Oxyhemoglobin Arterial 92 92 - 100 %    Base Excess Art 10.0 mmol/L   XR Chest Port 1 View    Narrative    Exam: XR CHEST PORT 1 VW, 5/17/2019 12:26 PM    Indication: ETT PLACEMENT    Comparison: Chest x-ray 5/17/2019    Findings:   Frontal chest x-ray. Endotracheal tube projects about 6 cm above the  coleman. Left IJ central venous catheter with tip in mid SVC. Enteric  tube tip projects over the stomach. Diffuse interstitial and hazy  opacities involving both the lungs. Small left pleural effusion. No  pneumothorax.      Impression    Impression:   1. Endotracheal tube tip is in good position, projecting about 6 cm  above the coleman.  2. Persistent interstitial and hazy opacity involving the lungs.  3. Small left pleural effusion.    I have personally reviewed the examination and initial interpretation  and I agree with the findings.    DAVID VANCE MD   CBC (Every 8 Hrs x2)   Result Value Ref Range    WBC 8.8 4.0 - 11.0 10e9/L     RBC Count 2.47 (L) 3.8 - 5.2 10e12/L    Hemoglobin 7.2 (L) 11.7 - 15.7 g/dL    Hematocrit 23.2 (L) 35.0 - 47.0 %    MCV 94 78 - 100 fl    MCH 29.1 26.5 - 33.0 pg    MCHC 31.0 (L) 31.5 - 36.5 g/dL    RDW 15.6 (H) 10.0 - 15.0 %    Platelet Count 163 150 - 450 10e9/L   Comprehensive metabolic panel   Result Value Ref Range    Sodium 148 (H) 133 - 144 mmol/L    Potassium 3.7 3.4 - 5.3 mmol/L    Chloride 112 (H) 94 - 109 mmol/L    Carbon Dioxide 35 (H) 20 - 32 mmol/L    Anion Gap <1 (L) 3 - 14 mmol/L    Glucose 124 (H) 70 - 99 mg/dL    Urea Nitrogen 14 7 - 30 mg/dL    Creatinine 0.63 0.52 - 1.04 mg/dL    GFR Estimate >90 >60 mL/min/[1.73_m2]    GFR Estimate If Black >90 >60 mL/min/[1.73_m2]    Calcium 8.0 (L) 8.5 - 10.1 mg/dL    Bilirubin Total 2.6 (H) 0.2 - 1.3 mg/dL    Albumin 2.2 (L) 3.4 - 5.0 g/dL    Protein Total 5.3 (L) 6.8 - 8.8 g/dL    Alkaline Phosphatase 169 (H) 40 - 150 U/L    ALT 19 0 - 50 U/L    AST 46 (H) 0 - 45 U/L   Blood gas arterial with oxyhemoglobin (AM Draw)   Result Value Ref Range    pH Arterial 7.42 7.35 - 7.45 pH    pCO2 Arterial 56 (H) 35 - 45 mm Hg    pO2 Arterial 68 (L) 80 - 105 mm Hg    Bicarbonate Arterial 36 (H) 21 - 28 mmol/L    FIO2 50.0     Oxyhemoglobin Arterial 92 92 - 100 %    Base Excess Art 10.1 mmol/L   Sodium   Result Value Ref Range    Sodium 144 133 - 144 mmol/L   Sodium   Result Value Ref Range    Sodium 143 133 - 144 mmol/L   Glucose by meter   Result Value Ref Range    Glucose 143 (H) 70 - 99 mg/dL   Heparin Xa (10a) Level   Result Value Ref Range    Heparin 10A Level 0.31 IU/mL   Magnesium   Result Value Ref Range    Magnesium 1.8 1.6 - 2.3 mg/dL   Phosphorus   Result Value Ref Range    Phosphorus 2.8 2.5 - 4.5 mg/dL   CBC (Every 8 Hrs x2)   Result Value Ref Range    WBC 8.3 4.0 - 11.0 10e9/L    RBC Count 2.39 (L) 3.8 - 5.2 10e12/L    Hemoglobin 7.1 (L) 11.7 - 15.7 g/dL    Hematocrit 22.4 (L) 35.0 - 47.0 %    MCV 94 78 - 100 fl    MCH 29.7 26.5 - 33.0 pg    MCHC 31.7 31.5 -  36.5 g/dL    RDW 15.6 (H) 10.0 - 15.0 %    Platelet Count 178 150 - 450 10e9/L   Comprehensive metabolic panel   Result Value Ref Range    Sodium 143 133 - 144 mmol/L    Potassium 3.6 3.4 - 5.3 mmol/L    Chloride 105 94 - 109 mmol/L    Carbon Dioxide 33 (H) 20 - 32 mmol/L    Anion Gap 5 3 - 14 mmol/L    Glucose 127 (H) 70 - 99 mg/dL    Urea Nitrogen 10 7 - 30 mg/dL    Creatinine 0.59 0.52 - 1.04 mg/dL    GFR Estimate >90 >60 mL/min/[1.73_m2]    GFR Estimate If Black >90 >60 mL/min/[1.73_m2]    Calcium 8.1 (L) 8.5 - 10.1 mg/dL    Bilirubin Total 2.0 (H) 0.2 - 1.3 mg/dL    Albumin 2.2 (L) 3.4 - 5.0 g/dL    Protein Total 5.5 (L) 6.8 - 8.8 g/dL    Alkaline Phosphatase 204 (H) 40 - 150 U/L    ALT 21 0 - 50 U/L    AST 40 0 - 45 U/L   Glucose by meter   Result Value Ref Range    Glucose 123 (H) 70 - 99 mg/dL

## 2019-05-18 NOTE — PROGRESS NOTES
MICU Progress Note  Ana Laura Wharton MRN: [id number]  Age: 48 year old, : 1970  Date of Admission:2019  Primary care provider: Liborio Lincoln            Assessment and Plan  Ana Laura Wharton is a 48 year old female with a PMHx significant for recurrent UTIs, HTN, bipolar I, and anxiety/depression who was transferred from [facility] ED for medication overdose as a suicide attempt.  Now intubated, sedated and on pressors due to vasoplegia 2/2 clonidine and coreg overdose.  Improving    Neurologic  # Concern for Anoxic Brain Injury: EEG without seizure activity.  MRI without abnormalities.Starting to have purposeful movements, low suspicion for anoxic brain injury.   - Monitor neuro status    Sedation: Weaning versed and fentanyl as tolerated, will consider change to precedex    #Suicide attempt  #Bipolar I, anxiety, depression  - Psych consult when medically stable   - Holding PTA citalopram 20mg every day, mirtazapine 60mg QPM              Consider resuming pending psych recs  - Sitter once awake    #EtOH Abuse: Heavy drinking prior to admission, long history of EtOH abuse  - IV thiamine and folate    Cardiovascular  #Clonidine/carvedilol overdose:   #Hypotension, lactic acidosis:  Patient reported taking 180 tabs total of both Coreg 6.25mg and clonidine 0.1mg on t 7pm; d/w poison control; s/p 10mg Narcan x2 and insulin gtt, intralipid, norepi, epi, phenyl, vasopressin, dopamine, and angiotensin 2.  Able to decreased Epi significantly after starting AT2.  Overnight  received methylene blue and additional fluids.  Lactate normalized.   weaned off AT2 and down to Epi and Vaso.  Insulin gtt stopped overnight . Continues to require pressors. Echocardiograms during admission have shown normal EF.   - Wean pressors as able    #Prolonged QTc  Patient presented w/ QTc of 507, has now decreased to 468    Respiratory  #Acute hypoxic/hypercapnic respiratory  failure requiring intubation:  #Concern for DAD/ARDS:   #Bilateral pleural effusion: Has bilateral infiltrates but volume overload may present alterative cause or exacerbating factor.  S/p flolan stopped on 05/16/19  - Repeating CT chest today            Bilateral pleural effusions            Diffuse bilateral opacities            Concerning for DAH?  Had recent unexplained Hgb drop            Will discuss bronch utility   - Working on decreasing vent support, PEEP to 8, goal 5   - Diuresis as below     Gastrointestinal  #Elevated bilirubin: Direct predominant.  Suspect cholestasis.   - CTM    # Nutrition:   - Nutrition to start TF  - Speech eval when awake    #Elevated AST: Resolved  #Elevated ALT: Suspect related to alcohol use, lower than would expect from shock.  Improving.    - CTM     #Ileus/constipation: Resolved  CT abd/pelvis w/ con showed large stool burden, ileus but could not r/o SBO, and a bilobed low-density lesion along the gastric fundus (unchanged from 7/2018), duplication cyst vs neoplasm.  PO gastrografin 5/13 without significant result.  Continued bowel regimen.     #Pancreatitis: Resolved   #Hx of alcohol induced pancreatitis   Lipase 1,100; per chart review patient was c/o abdominal pain prior to intubation, CT abd/pelvis w/ con showed no pancreatitis.  Per friend was drinking heavily prior to admission.  Lipase normalized on 5/14 recheck.    Infectious Disease   # Concern for aspiration/CAP:   UA not convincing for infection; CXR w/o infiltrate, procal wnl, but has now increased.  Afebrile, but leukocytosis continues.  Could be sign of infection or anoxic brain injury and spilling from central release.  But downtrend is reassuring for resolving infection.   Do note single elevated temp 5/15.    - See cultures below  - CTX  course completed   - Low threshold to cover for hospital acquired infections if febrile    Antibiotics:  Cefepime 5/11-12  Flagyl 5/11-12  Ceftriaxone  5/13-18    Cultures:  MRSA nares 5/12 negative   Blood cultures: 5/11 NGTD, 5/15 NGTD  UC 5/11 >100k colonies coag - staph (susceptabilities pending)  UC  5/12 NGTD  Sputum 5/15: mixed mina    Hematology  #Right internal jugular thrombus: suspected line associated 2/2 attempted line placement at OSH.  Interval increase on repeat US 5/14.    - Heparin drip, high intensity    #Acute Normocytic Anemia:  Suspect initial drop was dilutional.  However interval drop 5/15-16 unclear cause.  No overt bleeding at this time.  Direct predominant hyperbilirubinemia.  Low suspicion for hemolysis.  Possibly still related to fluid shifts or lab fluctuation.  Will watch closely.  Some concern for DAH as above  - Transfuse for hgb <7  - CTM    # Thrombocytopenia: Occurred in setting of massive fluid resuscitation.  Suspect largely dilutional, correlates with hgb.  Could have been component of marrow suppression as well.  HIT score was low.    - CTM    #Leukocytosis: Reactive vs infectious.  Resolved  - See ID section  - CTM    Endocrine  #Glucose  - CTM    #Hypothyroidism   - Cont Synthroid     Renal/Electolytes/FEN  # Oliguria and EDUARDO:  Resolved  # Volume overload: BUN and creatinine mildly elevated on presentation.  FENa was 0.1%.  Consistent with prerenal picture.  Diuresis going well.     - Strict I/O  - Napoles in  - Continuing diuresis, lasix, goal net negative 1-2L daily    # Hypernatremia: Resovled  Due to diuresis and salt load from tube feeds.   - Aggressively replaced water 5/17  - CTM    #Overactive bladder:  - Cont oxybutynin, hold mirabegron     #Frequent UTIs:  - Holding cipro 125mg every day for UTI ppx given prolonged QT   - Currently on CTX as above    ACID/BASE/ELECTROLYTES:   # Metabolic alkalosis:  - Consider acetazolamide for bicarb diuresis if not improving?    # Electrolytes:  - On protocol   - CTM    VOLUME STATUS: Remains hypervolemic, diuresis as above    Skin Care  #Pressure injury coccyx  - Turns per  "nursing    PPX:  DVT: Heparin as above. GI: PPI consider switch to H2 blocker  FEN: NPO, consider TF if to remain intubated and ileus resolving   Consults: Neuro ICU, SW, Pharmacy  Tubes/Lines/Drains: Reviewed    CODE: FULL  Family: Ex  Gabriel was listed as emergency contact: [phone], boyfriend Nessa has been present at bedside [phone].  Mother is reportedly? estranged (per SO), but would like to be decision maker [phone].  Has 16 year old son who she has not spoken with and Brother Linden [phone]    Per SW consult.  Brother Linden will be decision maker for now, however mother has expressed desire to assume this role.  Complex psycho social back drop.  Mother has been grossly appropriate with staff thus far.  Appreciate SW assistance in this matter.     Dispo: ICU pending improved neuro status, pressor and vent weaning.       Patient discussed with staff attending, Dr. Grewal.  Please feel free to page with questions.    Duane Hill MD   Internal Medicine PGY-1  Pager: [phone]         Interval History   No overnight events.  Sats dropping this morning.  O2 needs increased.  Hypernatremic yesterday.          Vitals  Vital signs:  Temp: 98.7  F (37.1  C) Temp src: Axillary BP: 96/57   Heart Rate: 103 Resp: 17 SpO2: (!) 88 %(Simultaneous filing. User may not have seen previous data.) O2 Device: Mechanical Ventilator   Height: 165.7 cm (5' 5.24\") Weight: 62 kg (136 lb 11 oz)  Estimated body mass index is 22.58 kg/m  as calculated from the following:    Height as of this encounter: 1.657 m (5' 5.24\").    Weight as of this encounter: 62 kg (136 lb 11 oz).        Ventilator  Ventilation Mode: CMV/AC  (Continuous Mandatory Ventilation/ Assist Control)  FiO2 (%): 70 %  Rate Set (breaths/minute): 14 breaths/min  Tidal Volume Set (mL): 400 mL  PEEP (cm H2O): 8 cmH2O  Oxygen Concentration (%): 50 %  Resp: 15    Intake/Output    Intake/Output Summary (Last 24 hours) at 5/18/2019 1420  Last data filed at " 5/18/2019 1400  Gross per 24 hour   Intake 6446.74 ml   Output 5730 ml   Net 716.74 ml         Physical Exam    Gen: Intubated, continues to be intermittently interactive   HEENT:AT/ NC, PERRL,  sclera anicteric  PULM/THORAX:Coarse breath sounds anteriorly  CV:RRR, S1 and S2 appreciated, no extra heart sounds, murmurs or rub auscultated  ABD: Distended and tympanic, no HSM appreciated.  EXT: Warm,  Edema present in hands and feet  SKIN: Capillary refill normal  NEURO: Responsive, moving all four ext.  PERRL.  1-2 beats clonus    Labs last 24 hours reviewed

## 2019-05-19 ENCOUNTER — APPOINTMENT (OUTPATIENT)
Dept: GENERAL RADIOLOGY | Facility: CLINIC | Age: 49
DRG: 987 | End: 2019-05-19
Attending: ANESTHESIOLOGY
Payer: COMMERCIAL

## 2019-05-19 LAB
ALBUMIN SERPL-MCNC: 2.2 G/DL (ref 3.4–5)
ALBUMIN UR-MCNC: NEGATIVE MG/DL
ALP SERPL-CCNC: 248 U/L (ref 40–150)
ALT SERPL W P-5'-P-CCNC: 18 U/L (ref 0–50)
ANION GAP SERPL CALCULATED.3IONS-SCNC: 1 MMOL/L (ref 3–14)
ANION GAP SERPL CALCULATED.3IONS-SCNC: 3 MMOL/L (ref 3–14)
APPEARANCE UR: CLEAR
AST SERPL W P-5'-P-CCNC: 35 U/L (ref 0–45)
BILIRUB SERPL-MCNC: 1.4 MG/DL (ref 0.2–1.3)
BILIRUB UR QL STRIP: ABNORMAL
BLD PROD TYP BPU: NORMAL
BLD UNIT ID BPU: 0
BLOOD PRODUCT CODE: NORMAL
BPU ID: NORMAL
BUN SERPL-MCNC: 12 MG/DL (ref 7–30)
BUN SERPL-MCNC: 14 MG/DL (ref 7–30)
CALCIUM SERPL-MCNC: 7.9 MG/DL (ref 8.5–10.1)
CALCIUM SERPL-MCNC: 8.1 MG/DL (ref 8.5–10.1)
CHLORIDE SERPL-SCNC: 104 MMOL/L (ref 94–109)
CHLORIDE SERPL-SCNC: 106 MMOL/L (ref 94–109)
CO2 SERPL-SCNC: 32 MMOL/L (ref 20–32)
CO2 SERPL-SCNC: 34 MMOL/L (ref 20–32)
COLOR UR AUTO: ABNORMAL
CORTIS SERPL-MCNC: 5.8 UG/DL (ref 4–22)
CREAT SERPL-MCNC: 0.66 MG/DL (ref 0.52–1.04)
CREAT SERPL-MCNC: 0.68 MG/DL (ref 0.52–1.04)
ERYTHROCYTE [DISTWIDTH] IN BLOOD BY AUTOMATED COUNT: 15.7 % (ref 10–15)
ERYTHROCYTE [DISTWIDTH] IN BLOOD BY AUTOMATED COUNT: 16.1 % (ref 10–15)
GFR SERPL CREATININE-BSD FRML MDRD: >90 ML/MIN/{1.73_M2}
GFR SERPL CREATININE-BSD FRML MDRD: >90 ML/MIN/{1.73_M2}
GLUCOSE SERPL-MCNC: 109 MG/DL (ref 70–99)
GLUCOSE SERPL-MCNC: 126 MG/DL (ref 70–99)
GLUCOSE UR STRIP-MCNC: NEGATIVE MG/DL
GRAM STN SPEC: NORMAL
GRAM STN SPEC: NORMAL
HCO3 BLD-SCNC: 32 MMOL/L (ref 21–28)
HCT VFR BLD AUTO: 20.9 % (ref 35–47)
HCT VFR BLD AUTO: 24.9 % (ref 35–47)
HGB BLD-MCNC: 6.4 G/DL (ref 11.7–15.7)
HGB BLD-MCNC: 7.5 G/DL (ref 11.7–15.7)
HGB BLD-MCNC: 7.8 G/DL (ref 11.7–15.7)
HGB UR QL STRIP: NEGATIVE
INR PPP: 1.26 (ref 0.86–1.14)
KETONES UR STRIP-MCNC: NEGATIVE MG/DL
LEUKOCYTE ESTERASE UR QL STRIP: NEGATIVE
LMWH PPP CHRO-ACNC: 0.4 IU/ML
MAGNESIUM SERPL-MCNC: 1.8 MG/DL (ref 1.6–2.3)
MCH RBC QN AUTO: 29.5 PG (ref 26.5–33)
MCH RBC QN AUTO: 30.1 PG (ref 26.5–33)
MCHC RBC AUTO-ENTMCNC: 30.6 G/DL (ref 31.5–36.5)
MCHC RBC AUTO-ENTMCNC: 31.3 G/DL (ref 31.5–36.5)
MCV RBC AUTO: 96 FL (ref 78–100)
MCV RBC AUTO: 96 FL (ref 78–100)
MRSA DNA SPEC QL NAA+PROBE: NEGATIVE
NITRATE UR QL: NEGATIVE
O2/TOTAL GAS SETTING VFR VENT: 60 %
PCO2 BLD: 47 MM HG (ref 35–45)
PH BLD: 7.45 PH (ref 7.35–7.45)
PH UR STRIP: 7.5 PH (ref 5–7)
PLATELET # BLD AUTO: 210 10E9/L (ref 150–450)
PLATELET # BLD AUTO: 236 10E9/L (ref 150–450)
PO2 BLD: 255 MM HG (ref 80–105)
POTASSIUM SERPL-SCNC: 4.1 MMOL/L (ref 3.4–5.3)
POTASSIUM SERPL-SCNC: 4.4 MMOL/L (ref 3.4–5.3)
PROCALCITONIN SERPL-MCNC: 0.26 NG/ML
PROCALCITONIN SERPL-MCNC: 0.27 NG/ML
PROT SERPL-MCNC: 5.6 G/DL (ref 6.8–8.8)
RBC # BLD AUTO: 2.17 10E12/L (ref 3.8–5.2)
RBC # BLD AUTO: 2.59 10E12/L (ref 3.8–5.2)
RBC #/AREA URNS AUTO: 6 /HPF (ref 0–2)
RETICS # AUTO: 83.9 10E9/L (ref 25–95)
RETICS/RBC NFR AUTO: 3.3 % (ref 0.5–2)
SODIUM SERPL-SCNC: 140 MMOL/L (ref 133–144)
SOURCE: ABNORMAL
SP GR UR STRIP: 1.01 (ref 1–1.03)
SPECIMEN SOURCE: NORMAL
SPECIMEN SOURCE: NORMAL
SQUAMOUS #/AREA URNS AUTO: <1 /HPF (ref 0–1)
TRANS CELLS #/AREA URNS HPF: <1 /HPF (ref 0–1)
TRANSFUSION STATUS PATIENT QL: NORMAL
TRANSFUSION STATUS PATIENT QL: NORMAL
UROBILINOGEN UR STRIP-MCNC: 8 MG/DL (ref 0–2)
WBC # BLD AUTO: 9.7 10E9/L (ref 4–11)
WBC # BLD AUTO: 9.8 10E9/L (ref 4–11)
WBC #/AREA URNS AUTO: 2 /HPF (ref 0–5)

## 2019-05-19 PROCEDURE — 25000128 H RX IP 250 OP 636: Performed by: STUDENT IN AN ORGANIZED HEALTH CARE EDUCATION/TRAINING PROGRAM

## 2019-05-19 PROCEDURE — 87640 STAPH A DNA AMP PROBE: CPT

## 2019-05-19 PROCEDURE — 36415 COLL VENOUS BLD VENIPUNCTURE: CPT | Performed by: STUDENT IN AN ORGANIZED HEALTH CARE EDUCATION/TRAINING PROGRAM

## 2019-05-19 PROCEDURE — 25000132 ZZH RX MED GY IP 250 OP 250 PS 637: Performed by: ANESTHESIOLOGY

## 2019-05-19 PROCEDURE — 25000132 ZZH RX MED GY IP 250 OP 250 PS 637: Performed by: STUDENT IN AN ORGANIZED HEALTH CARE EDUCATION/TRAINING PROGRAM

## 2019-05-19 PROCEDURE — 40000275 ZZH STATISTIC RCP TIME EA 10 MIN

## 2019-05-19 PROCEDURE — 86900 BLOOD TYPING SEROLOGIC ABO: CPT | Performed by: ANESTHESIOLOGY

## 2019-05-19 PROCEDURE — 99291 CRITICAL CARE FIRST HOUR: CPT | Mod: 25 | Performed by: INTERNAL MEDICINE

## 2019-05-19 PROCEDURE — 85610 PROTHROMBIN TIME: CPT | Performed by: STUDENT IN AN ORGANIZED HEALTH CARE EDUCATION/TRAINING PROGRAM

## 2019-05-19 PROCEDURE — 82533 TOTAL CORTISOL: CPT

## 2019-05-19 PROCEDURE — 85018 HEMOGLOBIN: CPT

## 2019-05-19 PROCEDURE — 25000125 ZZHC RX 250: Performed by: STUDENT IN AN ORGANIZED HEALTH CARE EDUCATION/TRAINING PROGRAM

## 2019-05-19 PROCEDURE — 87641 MR-STAPH DNA AMP PROBE: CPT

## 2019-05-19 PROCEDURE — 25800030 ZZH RX IP 258 OP 636

## 2019-05-19 PROCEDURE — 25000132 ZZH RX MED GY IP 250 OP 250 PS 637

## 2019-05-19 PROCEDURE — 27210429 ZZH NUTRITION PRODUCT INTERMEDIATE LITER

## 2019-05-19 PROCEDURE — 94640 AIRWAY INHALATION TREATMENT: CPT | Mod: 76

## 2019-05-19 PROCEDURE — 86850 RBC ANTIBODY SCREEN: CPT | Performed by: ANESTHESIOLOGY

## 2019-05-19 PROCEDURE — 94640 AIRWAY INHALATION TREATMENT: CPT

## 2019-05-19 PROCEDURE — 25000128 H RX IP 250 OP 636

## 2019-05-19 PROCEDURE — 85027 COMPLETE CBC AUTOMATED: CPT | Performed by: INTERNAL MEDICINE

## 2019-05-19 PROCEDURE — 86901 BLOOD TYPING SEROLOGIC RH(D): CPT | Performed by: ANESTHESIOLOGY

## 2019-05-19 PROCEDURE — 25000128 H RX IP 250 OP 636: Performed by: ANESTHESIOLOGY

## 2019-05-19 PROCEDURE — 83735 ASSAY OF MAGNESIUM: CPT

## 2019-05-19 PROCEDURE — 94003 VENT MGMT INPAT SUBQ DAY: CPT

## 2019-05-19 PROCEDURE — 85027 COMPLETE CBC AUTOMATED: CPT | Performed by: STUDENT IN AN ORGANIZED HEALTH CARE EDUCATION/TRAINING PROGRAM

## 2019-05-19 PROCEDURE — 25800030 ZZH RX IP 258 OP 636: Performed by: STUDENT IN AN ORGANIZED HEALTH CARE EDUCATION/TRAINING PROGRAM

## 2019-05-19 PROCEDURE — 85520 HEPARIN ASSAY: CPT | Performed by: STUDENT IN AN ORGANIZED HEALTH CARE EDUCATION/TRAINING PROGRAM

## 2019-05-19 PROCEDURE — 84295 ASSAY OF SERUM SODIUM: CPT

## 2019-05-19 PROCEDURE — 85045 AUTOMATED RETICULOCYTE COUNT: CPT

## 2019-05-19 PROCEDURE — 80053 COMPREHEN METABOLIC PANEL: CPT | Performed by: STUDENT IN AN ORGANIZED HEALTH CARE EDUCATION/TRAINING PROGRAM

## 2019-05-19 PROCEDURE — 71045 X-RAY EXAM CHEST 1 VIEW: CPT

## 2019-05-19 PROCEDURE — P9016 RBC LEUKOCYTES REDUCED: HCPCS | Performed by: ANESTHESIOLOGY

## 2019-05-19 PROCEDURE — 87040 BLOOD CULTURE FOR BACTERIA: CPT | Performed by: STUDENT IN AN ORGANIZED HEALTH CARE EDUCATION/TRAINING PROGRAM

## 2019-05-19 PROCEDURE — 20000004 ZZH R&B ICU UMMC

## 2019-05-19 PROCEDURE — 87070 CULTURE OTHR SPECIMN AEROBIC: CPT | Performed by: STUDENT IN AN ORGANIZED HEALTH CARE EDUCATION/TRAINING PROGRAM

## 2019-05-19 PROCEDURE — 86923 COMPATIBILITY TEST ELECTRIC: CPT | Performed by: ANESTHESIOLOGY

## 2019-05-19 PROCEDURE — 87205 SMEAR GRAM STAIN: CPT | Performed by: STUDENT IN AN ORGANIZED HEALTH CARE EDUCATION/TRAINING PROGRAM

## 2019-05-19 PROCEDURE — 81001 URINALYSIS AUTO W/SCOPE: CPT | Performed by: STUDENT IN AN ORGANIZED HEALTH CARE EDUCATION/TRAINING PROGRAM

## 2019-05-19 PROCEDURE — 84145 PROCALCITONIN (PCT): CPT | Performed by: STUDENT IN AN ORGANIZED HEALTH CARE EDUCATION/TRAINING PROGRAM

## 2019-05-19 PROCEDURE — 40000014 ZZH STATISTIC ARTERIAL MONITORING DAILY

## 2019-05-19 PROCEDURE — 40000986 XR CHEST PORT 1 VW

## 2019-05-19 PROCEDURE — 80048 BASIC METABOLIC PNL TOTAL CA: CPT

## 2019-05-19 PROCEDURE — 82803 BLOOD GASES ANY COMBINATION: CPT | Performed by: STUDENT IN AN ORGANIZED HEALTH CARE EDUCATION/TRAINING PROGRAM

## 2019-05-19 RX ORDER — POLYETHYLENE GLYCOL 3350 17 G/17G
17 POWDER, FOR SOLUTION ORAL ONCE
Status: DISCONTINUED | OUTPATIENT
Start: 2019-05-19 | End: 2019-05-21

## 2019-05-19 RX ORDER — NOREPINEPHRINE BITARTRATE 0.06 MG/ML
0.03-0.4 INJECTION, SOLUTION INTRAVENOUS CONTINUOUS
Status: DISCONTINUED | OUTPATIENT
Start: 2019-05-19 | End: 2019-05-21

## 2019-05-19 RX ORDER — FUROSEMIDE 10 MG/ML
40 INJECTION INTRAMUSCULAR; INTRAVENOUS ONCE
Status: COMPLETED | OUTPATIENT
Start: 2019-05-19 | End: 2019-05-19

## 2019-05-19 RX ADMIN — MIDAZOLAM 4 MG: 1 INJECTION INTRAMUSCULAR; INTRAVENOUS at 22:35

## 2019-05-19 RX ADMIN — FENTANYL CITRATE 75 MCG: 50 INJECTION, SOLUTION INTRAMUSCULAR; INTRAVENOUS at 20:25

## 2019-05-19 RX ADMIN — FUROSEMIDE 40 MG: 10 INJECTION, SOLUTION INTRAVENOUS at 13:28

## 2019-05-19 RX ADMIN — ACETAMINOPHEN 975 MG: 325 TABLET, FILM COATED ORAL at 20:16

## 2019-05-19 RX ADMIN — CEFEPIME 2 G: 2 INJECTION, POWDER, FOR SOLUTION INTRAVENOUS at 20:39

## 2019-05-19 RX ADMIN — SENNOSIDES AND DOCUSATE SODIUM 1 TABLET: 8.6; 5 TABLET ORAL at 11:29

## 2019-05-19 RX ADMIN — FENTANYL CITRATE 50 MCG: 50 INJECTION, SOLUTION INTRAMUSCULAR; INTRAVENOUS at 23:32

## 2019-05-19 RX ADMIN — VANCOMYCIN HYDROCHLORIDE 1500 MG: 10 INJECTION, POWDER, LYOPHILIZED, FOR SOLUTION INTRAVENOUS at 06:51

## 2019-05-19 RX ADMIN — IPRATROPIUM BROMIDE AND ALBUTEROL SULFATE 3 ML: .5; 3 SOLUTION RESPIRATORY (INHALATION) at 11:47

## 2019-05-19 RX ADMIN — CEFEPIME 2 G: 2 INJECTION, POWDER, FOR SOLUTION INTRAVENOUS at 13:28

## 2019-05-19 RX ADMIN — FOLIC ACID 1 MG: 1 TABLET ORAL at 08:05

## 2019-05-19 RX ADMIN — MIDAZOLAM 4 MG: 1 INJECTION INTRAMUSCULAR; INTRAVENOUS at 20:15

## 2019-05-19 RX ADMIN — MIDAZOLAM 2 MG: 1 INJECTION INTRAMUSCULAR; INTRAVENOUS at 06:30

## 2019-05-19 RX ADMIN — PANTOPRAZOLE SODIUM 40 MG: 40 TABLET, DELAYED RELEASE ORAL at 08:06

## 2019-05-19 RX ADMIN — Medication 2 G: at 21:56

## 2019-05-19 RX ADMIN — DEXMEDETOMIDINE 0.6 MCG/KG/HR: 100 INJECTION, SOLUTION, CONCENTRATE INTRAVENOUS at 15:23

## 2019-05-19 RX ADMIN — FENTANYL CITRATE 100 MCG: 50 INJECTION, SOLUTION INTRAMUSCULAR; INTRAVENOUS at 06:40

## 2019-05-19 RX ADMIN — FENTANYL CITRATE 50 MCG: 50 INJECTION, SOLUTION INTRAMUSCULAR; INTRAVENOUS at 22:35

## 2019-05-19 RX ADMIN — VANCOMYCIN HYDROCHLORIDE 1250 MG: 10 INJECTION, POWDER, LYOPHILIZED, FOR SOLUTION INTRAVENOUS at 18:11

## 2019-05-19 RX ADMIN — FENTANYL CITRATE 100 MCG: 50 INJECTION, SOLUTION INTRAMUSCULAR; INTRAVENOUS at 21:47

## 2019-05-19 RX ADMIN — OLANZAPINE 10 MG: 10 TABLET, FILM COATED ORAL at 20:16

## 2019-05-19 RX ADMIN — OLANZAPINE 10 MG: 10 TABLET, FILM COATED ORAL at 08:06

## 2019-05-19 RX ADMIN — Medication 100 MG: at 08:05

## 2019-05-19 RX ADMIN — HEPARIN SODIUM 1350 UNITS/HR: 10000 INJECTION, SOLUTION INTRAVENOUS at 10:40

## 2019-05-19 RX ADMIN — IPRATROPIUM BROMIDE AND ALBUTEROL SULFATE 3 ML: .5; 3 SOLUTION RESPIRATORY (INHALATION) at 07:53

## 2019-05-19 RX ADMIN — MIDAZOLAM 10 MG/HR: 5 INJECTION INTRAMUSCULAR; INTRAVENOUS at 07:58

## 2019-05-19 RX ADMIN — IPRATROPIUM BROMIDE AND ALBUTEROL SULFATE 3 ML: .5; 3 SOLUTION RESPIRATORY (INHALATION) at 15:34

## 2019-05-19 RX ADMIN — ACETAMINOPHEN 975 MG: 325 TABLET, FILM COATED ORAL at 01:37

## 2019-05-19 RX ADMIN — LEVOTHYROXINE SODIUM 50 MCG: 25 TABLET ORAL at 08:05

## 2019-05-19 RX ADMIN — MIDAZOLAM 2 MG: 1 INJECTION INTRAMUSCULAR; INTRAVENOUS at 12:02

## 2019-05-19 RX ADMIN — Medication 0.03 MCG/KG/MIN: at 05:22

## 2019-05-19 RX ADMIN — MIDAZOLAM 4 MG: 1 INJECTION INTRAMUSCULAR; INTRAVENOUS at 23:31

## 2019-05-19 RX ADMIN — SODIUM CHLORIDE, POTASSIUM CHLORIDE, SODIUM LACTATE AND CALCIUM CHLORIDE 1000 ML: 600; 310; 30; 20 INJECTION, SOLUTION INTRAVENOUS at 05:16

## 2019-05-19 RX ADMIN — MIDAZOLAM 10 MG/HR: 5 INJECTION INTRAMUSCULAR; INTRAVENOUS at 18:07

## 2019-05-19 RX ADMIN — IPRATROPIUM BROMIDE AND ALBUTEROL SULFATE 3 ML: .5; 3 SOLUTION RESPIRATORY (INHALATION) at 19:19

## 2019-05-19 RX ADMIN — MULTIVITAMIN 15 ML: LIQUID ORAL at 08:05

## 2019-05-19 RX ADMIN — CEFEPIME 2 G: 2 INJECTION, POWDER, FOR SOLUTION INTRAVENOUS at 06:17

## 2019-05-19 RX ADMIN — Medication 150 MCG/HR: at 07:59

## 2019-05-19 RX ADMIN — POLYETHYLENE GLYCOL 3350 17 G: 17 POWDER, FOR SOLUTION ORAL at 11:29

## 2019-05-19 ASSESSMENT — ACTIVITIES OF DAILY LIVING (ADL)
ADLS_ACUITY_SCORE: 17
ADLS_ACUITY_SCORE: 17
ADLS_ACUITY_SCORE: 20
ADLS_ACUITY_SCORE: 17

## 2019-05-19 NOTE — PLAN OF CARE
I/A: Pt labile. Initially very restless. Requiring versed/fentanyl boluses. Precedex started. Pt tolerated well. Fentanyl gtt decreased to 150 mcg/hr. Fentanyl and versed further decreased at approx 0400 due to pt's bp decreasing. 1 L LR given. Levophed started. Hgb 6.4 - MICU aware, and attempted to notify pt's brother. 1 unit prbc ordered, awaiting blood bank at this time. No signs of bleeding noted. MICU stated to continue Heparin gtt at this time. AC/VC tv 400 RR 14 Fio2 decreased to 50% and peep decreased from 8 to 6. No Bm since 5/16. Abdomen slightly distended/firm, pt also having residuals in high 300s - MICU aware. TF at goal. Free H20 100 ml/Q1 hr. Na this . Napoles in place urine output 100-300 ml Q2hr. Xa therapeutic this am.   P: Continue to follow POC, continue to ween Fio2 as tolerated, transfuse PRBC

## 2019-05-19 NOTE — PROGRESS NOTES
MICU Progress Note  Ana Laura Wharton MRN: [id number]  Age: 48 year old, : 1970  Date of Admission:2019  Primary care provider: Liborio Lincoln            Assessment and Plan  Ana Laura Wharton is a 48 year old female with a PMHx significant for recurrent UTIs, HTN, bipolar I, and anxiety/depression who was transferred from [facility] ED for medication overdose as a suicide attempt.  Remains intubated, sedated but off pressors until , minimally requirement currently.      Neurologic  # Concern for Anoxic Brain Injury: EEG without seizure activity.  MRI without abnormalities.Starting to have purposeful movements, low suspicion for anoxic brain injury.   - Monitor neuro status    Sedation:   Weaning versed and fentanyl as tolerated  Precedex    #Suicide attempt  #Bipolar I, anxiety, depression  - Psych consult when medically stable   - Holding PTA citalopram 20mg every day, mirtazapine 60mg QPM              Consider resuming pending psych recs  - Sitter once awake    #EtOH Abuse: Heavy drinking prior to admission, long history of EtOH abuse  - IV thiamine and folate    Cardiovascular  #Clonidine/carvedilol overdose:   #Hypotension, lactic acidosis:  Patient reported taking 180 tabs total of both Coreg 6.25mg and clonidine 0.1mg on t 7pm; d/w poison control; s/p 10mg Narcan x2 and insulin gtt, intralipid, norepi, epi, phenyl, vasopressin, dopamine, and angiotensin 2.  Able to decreased Epi significantly after starting AT2.  Overnight  received methylene blue and additional fluids.  Lactate normalized.   weaned off AT2 and down to Epi and Vaso.  Insulin gtt stopped overnight . Continues to require pressors. Echocardiograms during admission have shown normal EF.   - Wean pressors as able  - Cuff correlating well, will remove ART line    #Prolonged QTc  Patient presented w/ QTc of 507, decreased on recheck    Respiratory  #Acute hypoxic/hypercapnic  respiratory failure requiring intubation:  #Concern for DAD/ARDS:   #Bilateral pleural effusion: Has bilateral infiltrates but volume overload may present alterative cause or exacerbating factor.  S/p flolan stopped on 05/16/19.  Continued high O2 needs.  Volume status improving however still likely contributing.  Suspect PNA contributing as well.    - Working on decreasing vent support  - ABX as below    Gastrointestinal  #Elevated bilirubin: Direct predominant.  Suspect cholestasis.   - CTM    # Nutrition:   - Nutrition consulted for TF  - Speech eval when awake    #Elevated AST: Resolved  #Elevated ALT: Suspect was related to alcohol use  - CTM     #Ileus/constipation: Resolved  CT abd/pelvis w/ con showed large stool burden, ileus but could not r/o SBO, and a bilobed low-density lesion along the gastric fundus (unchanged from 7/2018), duplication cyst vs neoplasm.  PO gastrografin 5/13 without significant result.  Continued bowel regimen.     #Pancreatitis: Resolved   #Hx of alcohol induced pancreatitis   Lipase 1,100; per chart review patient was c/o abdominal pain prior to intubation, CT abd/pelvis w/ con showed no pancreatitis.  Per friend was drinking heavily prior to admission.  Lipase normalized on 5/14 recheck.     Infectious Disease   # Concern for aspiration/CAP:   UA not convincing for infection; CXR w/o infiltrate, procal wnl, but has now increased.  Afebrile, but leukocytosis continues.  Could be sign of infection or anoxic brain injury and spilling from central release.  But downtrend is reassuring for resolving infection.   Do note single elevated temp 5/15.  Completed ABX course with CTX, however again febrile 5/19, ABX resumed  - Resumed ABX with cefepime and vanc 5/19, continue pending culture data  - Repeat cultures pending  - Follow up MRSA nares    Antibiotics:  Cefepime 5/11-12 5/19-present  Flagyl 5/11-12  Ceftriaxone 5/13-18  Vancomycin 5/19-present    Cultures:  MRSA nares 5/12 negative    Blood cultures: 5/11 NGTD, 5/15 NGTD   UC 5/11 >100k colonies coag - staph (susceptabilities pending)  UC  5/12 NGTD  Sputum 5/15: mixed mina    Hematology  #Right internal jugular thrombus: suspected line associated 2/2 attempted line placement at OSH.  Interval increase on repeat US 5/14.    - Heparin drip, high intensity    #Acute Normocytic Anemia:  Suspect initial drop was dilutional.  However interval drop 5/15-16 unclear cause.  No overt bleeding at this time.  Direct predominant hyperbilirubinemia.  Low suspicion for hemolysis.  Possibly still related to fluid shifts or lab fluctuation.  Will watch closely.  Some concern for DAH as above, although marrow suppression 2/2 illness not unlikely.     - Required transfusion x2  - Follow up reticulocyte count  - Transfuse for hgb <7  - CTM    # Thrombocytopenia: Occurred in setting of massive fluid resuscitation.  Suspect largely dilutional, correlates with hgb.  Could have been component of marrow suppression as well.  HIT score was low.    - CTM    #Leukocytosis: Reactive vs infectious.  Resolved  - See ID section  - CTM    Endocrine  #Glucose  - CTM    #Hypothyroidism   - Cont Synthroid     Renal/Electolytes/FEN  #Oliguria and EDUARDO:  Resolved  #Volume overload: BUN and creatinine mildly elevated on presentation.  FENa was 0.1%.  Consistent with prerenal picture.  Producing large volumes of urine.    - Strict I/O  - Napoles in  - Goal net negative 1-2L daily    #Hypernatremia: Resovled  Due to diuresis and salt load from tube feeds.   - Aggressively replaced water 5/17  - CTM    #Overactive bladder:  - Cont oxybutynin, hold mirabegron     #Frequent UTIs:  - Holding cipro 125mg every day for UTI ppx given prolonged QT   - Currently on CTX as above    ACID/BASE/ELECTROLYTES:   #Metabolic alkalosis:  - Consider acetazolamide for bicarb diuresis if not improving?    #Electrolytes:  - On protocol   - CTM    VOLUME STATUS: Remains hypervolemic, diuresis as  "above    Skin Care  #Pressure injury coccyx  - Turns per nursing    PPX:  DVT: Heparin as above. GI: PPI   FEN: TF per nutrition, NPO pending mental status  Consults: Neuro ICU, SW, Pharmacy  Tubes/Lines/Drains: Reviewed    CODE: FULL  Family: Ex  Gabriel was listed as emergency contact: [phone], boyfriend Nessa has been present at bedside [phone].  Mother is reportedly? estranged (per SO), but would like to be decision maker [phone].  Has 16 year old son who she has not spoken with and Brother Linden [phone]    Per SW consult.  Brother Linden will be decision maker for now, however mother has expressed desire to assume this role.  Complex psycho social back drop.  Mother has been grossly appropriate with staff thus far.  Appreciate  assistance in this matter.     Dispo: ICU pending improved neuro status, pressor and vent weaning.       Patient discussed with staff attending, Dr. Grewal.  Please feel free to page with questions.    Duane Hill MD   Internal Medicine PGY-1  Pager: [phone]         Interval History   Febrile.  Increased pressor requirements.  ABX restarted due to concern for infection.   Hgb dropped overnight, transfused.          Vitals  Vital signs:  Temp: 99.7  F (37.6  C) Temp src: Axillary BP: 113/62 Pulse: 96 Heart Rate: 92 Resp: 18 SpO2: 99 % O2 Device: Mechanical Ventilator   Height: 165.7 cm (5' 5.24\") Weight: 62 kg (136 lb 11 oz)  Estimated body mass index is 22.58 kg/m  as calculated from the following:    Height as of this encounter: 1.657 m (5' 5.24\").    Weight as of this encounter: 62 kg (136 lb 11 oz).        Ventilator  Ventilation Mode: CMV/AC  (Continuous Mandatory Ventilation/ Assist Control)  FiO2 (%): 70 %  Rate Set (breaths/minute): 14 breaths/min  Tidal Volume Set (mL): 400 mL  PEEP (cm H2O): 8 cmH2O  Oxygen Concentration (%): (S) 60 %  Resp: 18    Intake/Output    Intake/Output Summary (Last 24 hours) at 5/19/2019 7048  Last data filed at 5/19/2019 " 1100  Gross per 24 hour   Intake 5254.06 ml   Output 4970 ml   Net 284.06 ml           Physical Exam    Gen: Intubated, continues to be intermittently reponsive   HEENT:AT/ NC, PERRL,  sclera anicteric  PULM/THORAX:Coarse breath sounds anteriorly, pleural rub RLL  CV:RRR, S1 and S2 appreciated, no extra heart sounds, murmurs or rub auscultated  ABD: Soft, non distended, normal bowel sounds  EXT: Warm,  Edema present in hands and feet  SKIN: Capillary refill normal  NEURO: Responding to tactile stim, moving all four ext.  PERRL.  1-2 beats clonus    Labs last 24 hours reviewed

## 2019-05-19 NOTE — PLAN OF CARE
Remains intubated and sedated. Arouses to voice, does not follow commands, intermittently restless. On levo to maintain MAP > 65. Fem art line pulled per MD order. 40% FiO2, PEEP 8. No BM this shift, miralax and senna administered. 40mg IV lasix administered with good response. Hep gtt infusing at 1350u/hr. 1u PRBC transfused, recheck improved. L internal jugular stitches noted to be out during dressing change, CXR obtained and line resutured by MD. Continue to monitor neuro status, wean vent support as able.

## 2019-05-19 NOTE — PHARMACY-VANCOMYCIN DOSING SERVICE
Pharmacy Vancomycin Initial Note  Date of Service May 19, 2019  Patient's  1970  48 year old, female    Indication: Bacteremia    Current estimated CrCl = Estimated Creatinine Clearance: 102 mL/min (based on SCr of 0.66 mg/dL).    Creatinine for last 3 days  2019: 10:01 AM Creatinine 0.58 mg/dL;  4:25 PM Creatinine 0.66 mg/dL  2019:  4:47 AM Creatinine 0.68 mg/dL;  3:01 PM Creatinine 0.63 mg/dL  2019:  3:50 AM Creatinine 0.59 mg/dL; 12:27 PM Creatinine 0.68 mg/dL; 10:12 PM Creatinine 0.61 mg/dL  2019:  4:34 AM Creatinine 0.66 mg/dL    Recent Vancomycin Level(s) for last 3 days  No results found for requested labs within last 72 hours.      Vancomycin IV Administrations (past 72 hours)      No vancomycin orders with administrations in past 72 hours.                Nephrotoxins and other renal medications (From now, onward)    Start     Dose/Rate Route Frequency Ordered Stop    19 1800  vancomycin 1250 mg in 0.9% NaCl 250 mL intermittent infusion 1,250 mg      1,250 mg  over 90 Minutes Intravenous EVERY 12 HOURS 19 0525      19 0530  vancomycin 1500 mg in 0.9% NaCl 250 ml intermittent infusion 1,500 mg      1,500 mg  over 90 Minutes Intravenous ONCE 19 0525      19 0515  norepinephrine (LEVOPHED) 16 mg in  mL infusion      0.03-0.4 mcg/kg/min × 49.4 kg (Dosing Weight)  1.4-18.5 mL/hr  Intravenous CONTINUOUS 19 0515            Contrast Orders - past 72 hours (72h ago, onward)    Start     Dose/Rate Route Frequency Ordered Stop    19 1045  gadobutrol (GADAVIST) injection 7.5 mL      7.5 mL Intravenous ONCE 19 1034 19 1123                Plan:  1.  Start vancomycin  1500 mg IV X1 followed by 1500 mg IV q12h.   2.  Goal Trough Level: 15-20 mg/L   3.  Pharmacy will check trough levels as appropriate in 1-3 Days.    4. Serum creatinine levels will be ordered daily for the first week of therapy and at least twice weekly for subsequent  weeks.    5. Chromo method utilized to dose vancomycin therapy: Method 2    Niels Monreal

## 2019-05-20 ENCOUNTER — APPOINTMENT (OUTPATIENT)
Dept: GENERAL RADIOLOGY | Facility: CLINIC | Age: 49
DRG: 987 | End: 2019-05-20
Attending: ANESTHESIOLOGY
Payer: COMMERCIAL

## 2019-05-20 LAB
ABO + RH BLD: NORMAL
ABO + RH BLD: NORMAL
ALBUMIN SERPL-MCNC: 2.1 G/DL (ref 3.4–5)
ALP SERPL-CCNC: 258 U/L (ref 40–150)
ALT SERPL W P-5'-P-CCNC: 14 U/L (ref 0–50)
ANION GAP SERPL CALCULATED.3IONS-SCNC: 4 MMOL/L (ref 3–14)
ANISOCYTOSIS BLD QL SMEAR: SLIGHT
AST SERPL W P-5'-P-CCNC: 25 U/L (ref 0–45)
BASOPHILS # BLD AUTO: 0.1 10E9/L (ref 0–0.2)
BASOPHILS NFR BLD AUTO: 0.9 %
BILIRUB SERPL-MCNC: 1.2 MG/DL (ref 0.2–1.3)
BLD GP AB SCN SERPL QL: NORMAL
BLD PROD TYP BPU: NORMAL
BLD PROD TYP BPU: NORMAL
BLD UNIT ID BPU: 0
BLOOD BANK CMNT PATIENT-IMP: NORMAL
BLOOD PRODUCT CODE: NORMAL
BPU ID: NORMAL
BUN SERPL-MCNC: 12 MG/DL (ref 7–30)
CALCIUM SERPL-MCNC: 8.2 MG/DL (ref 8.5–10.1)
CHLORIDE SERPL-SCNC: 107 MMOL/L (ref 94–109)
CO2 SERPL-SCNC: 30 MMOL/L (ref 20–32)
CREAT SERPL-MCNC: 0.56 MG/DL (ref 0.52–1.04)
CRP SERPL-MCNC: 45 MG/L (ref 0–8)
DIFFERENTIAL METHOD BLD: ABNORMAL
EOSINOPHIL # BLD AUTO: 0 10E9/L (ref 0–0.7)
EOSINOPHIL NFR BLD AUTO: 0 %
ERYTHROCYTE [DISTWIDTH] IN BLOOD BY AUTOMATED COUNT: 16.5 % (ref 10–15)
ERYTHROCYTE [DISTWIDTH] IN BLOOD BY AUTOMATED COUNT: 16.7 % (ref 10–15)
ERYTHROCYTE [DISTWIDTH] IN BLOOD BY AUTOMATED COUNT: 17.2 % (ref 10–15)
ERYTHROCYTE [SEDIMENTATION RATE] IN BLOOD BY WESTERGREN METHOD: 100 MM/H (ref 0–20)
GFR SERPL CREATININE-BSD FRML MDRD: >90 ML/MIN/{1.73_M2}
GLUCOSE SERPL-MCNC: 117 MG/DL (ref 70–99)
HCT VFR BLD AUTO: 21.1 % (ref 35–47)
HCT VFR BLD AUTO: 24 % (ref 35–47)
HCT VFR BLD AUTO: 27.7 % (ref 35–47)
HGB BLD-MCNC: 6.6 G/DL (ref 11.7–15.7)
HGB BLD-MCNC: 7.5 G/DL (ref 11.7–15.7)
HGB BLD-MCNC: 8.8 G/DL (ref 11.7–15.7)
LMWH PPP CHRO-ACNC: 0.19 IU/ML
LMWH PPP CHRO-ACNC: 0.32 IU/ML
LYMPHOCYTES # BLD AUTO: 2.9 10E9/L (ref 0.8–5.3)
LYMPHOCYTES NFR BLD AUTO: 23.1 %
MACROCYTES BLD QL SMEAR: PRESENT
MAGNESIUM SERPL-MCNC: 2.3 MG/DL (ref 1.6–2.3)
MCH RBC QN AUTO: 29.9 PG (ref 26.5–33)
MCH RBC QN AUTO: 30.1 PG (ref 26.5–33)
MCH RBC QN AUTO: 30.2 PG (ref 26.5–33)
MCHC RBC AUTO-ENTMCNC: 31.3 G/DL (ref 31.5–36.5)
MCHC RBC AUTO-ENTMCNC: 31.3 G/DL (ref 31.5–36.5)
MCHC RBC AUTO-ENTMCNC: 31.8 G/DL (ref 31.5–36.5)
MCV RBC AUTO: 95 FL (ref 78–100)
MCV RBC AUTO: 96 FL (ref 78–100)
MCV RBC AUTO: 96 FL (ref 78–100)
MICROCYTES BLD QL SMEAR: PRESENT
MONOCYTES # BLD AUTO: 1.9 10E9/L (ref 0–1.3)
MONOCYTES NFR BLD AUTO: 15.4 %
MYELOCYTES # BLD: 0.6 10E9/L
MYELOCYTES NFR BLD MANUAL: 5.1 %
NEUTROPHILS # BLD AUTO: 6.9 10E9/L (ref 1.6–8.3)
NEUTROPHILS NFR BLD AUTO: 55.5 %
NUM BPU REQUESTED: 2
PLATELET # BLD AUTO: 255 10E9/L (ref 150–450)
PLATELET # BLD AUTO: 259 10E9/L (ref 150–450)
PLATELET # BLD AUTO: 265 10E9/L (ref 150–450)
PLATELET # BLD EST: ABNORMAL 10*3/UL
POLYCHROMASIA BLD QL SMEAR: ABNORMAL
POTASSIUM SERPL-SCNC: 3.8 MMOL/L (ref 3.4–5.3)
PROT SERPL-MCNC: 5.9 G/DL (ref 6.8–8.8)
RBC # BLD AUTO: 2.19 10E12/L (ref 3.8–5.2)
RBC # BLD AUTO: 2.51 10E12/L (ref 3.8–5.2)
RBC # BLD AUTO: 2.91 10E12/L (ref 3.8–5.2)
RETICS # AUTO: 185.4 10E9/L (ref 25–95)
RETICS/RBC NFR AUTO: 6.4 % (ref 0.5–2)
SODIUM SERPL-SCNC: 142 MMOL/L (ref 133–144)
SPECIMEN EXP DATE BLD: NORMAL
TRANSFUSION STATUS PATIENT QL: NORMAL
TRANSFUSION STATUS PATIENT QL: NORMAL
WBC # BLD AUTO: 10 10E9/L (ref 4–11)
WBC # BLD AUTO: 12.5 10E9/L (ref 4–11)
WBC # BLD AUTO: 9.5 10E9/L (ref 4–11)

## 2019-05-20 PROCEDURE — 85520 HEPARIN ASSAY: CPT | Performed by: STUDENT IN AN ORGANIZED HEALTH CARE EDUCATION/TRAINING PROGRAM

## 2019-05-20 PROCEDURE — 80053 COMPREHEN METABOLIC PANEL: CPT | Performed by: STUDENT IN AN ORGANIZED HEALTH CARE EDUCATION/TRAINING PROGRAM

## 2019-05-20 PROCEDURE — 25000132 ZZH RX MED GY IP 250 OP 250 PS 637: Performed by: STUDENT IN AN ORGANIZED HEALTH CARE EDUCATION/TRAINING PROGRAM

## 2019-05-20 PROCEDURE — 85652 RBC SED RATE AUTOMATED: CPT | Performed by: INTERNAL MEDICINE

## 2019-05-20 PROCEDURE — 94640 AIRWAY INHALATION TREATMENT: CPT | Mod: 76

## 2019-05-20 PROCEDURE — 94003 VENT MGMT INPAT SUBQ DAY: CPT

## 2019-05-20 PROCEDURE — 20000004 ZZH R&B ICU UMMC

## 2019-05-20 PROCEDURE — 27210429 ZZH NUTRITION PRODUCT INTERMEDIATE LITER

## 2019-05-20 PROCEDURE — 71045 X-RAY EXAM CHEST 1 VIEW: CPT

## 2019-05-20 PROCEDURE — 40000275 ZZH STATISTIC RCP TIME EA 10 MIN

## 2019-05-20 PROCEDURE — P9016 RBC LEUKOCYTES REDUCED: HCPCS | Performed by: ANESTHESIOLOGY

## 2019-05-20 PROCEDURE — 85025 COMPLETE CBC W/AUTO DIFF WBC: CPT

## 2019-05-20 PROCEDURE — 25800030 ZZH RX IP 258 OP 636: Performed by: STUDENT IN AN ORGANIZED HEALTH CARE EDUCATION/TRAINING PROGRAM

## 2019-05-20 PROCEDURE — 25000128 H RX IP 250 OP 636: Performed by: STUDENT IN AN ORGANIZED HEALTH CARE EDUCATION/TRAINING PROGRAM

## 2019-05-20 PROCEDURE — 80048 BASIC METABOLIC PNL TOTAL CA: CPT

## 2019-05-20 PROCEDURE — 93010 ELECTROCARDIOGRAM REPORT: CPT | Performed by: INTERNAL MEDICINE

## 2019-05-20 PROCEDURE — 25800030 ZZH RX IP 258 OP 636

## 2019-05-20 PROCEDURE — 93005 ELECTROCARDIOGRAM TRACING: CPT

## 2019-05-20 PROCEDURE — 86140 C-REACTIVE PROTEIN: CPT | Performed by: STUDENT IN AN ORGANIZED HEALTH CARE EDUCATION/TRAINING PROGRAM

## 2019-05-20 PROCEDURE — 25000128 H RX IP 250 OP 636

## 2019-05-20 PROCEDURE — 99291 CRITICAL CARE FIRST HOUR: CPT | Mod: GC | Performed by: INTERNAL MEDICINE

## 2019-05-20 PROCEDURE — 85027 COMPLETE CBC AUTOMATED: CPT | Performed by: INTERNAL MEDICINE

## 2019-05-20 PROCEDURE — 25000132 ZZH RX MED GY IP 250 OP 250 PS 637

## 2019-05-20 PROCEDURE — 25000125 ZZHC RX 250: Performed by: STUDENT IN AN ORGANIZED HEALTH CARE EDUCATION/TRAINING PROGRAM

## 2019-05-20 PROCEDURE — 25000132 ZZH RX MED GY IP 250 OP 250 PS 637: Performed by: ANESTHESIOLOGY

## 2019-05-20 PROCEDURE — 85045 AUTOMATED RETICULOCYTE COUNT: CPT

## 2019-05-20 PROCEDURE — 85520 HEPARIN ASSAY: CPT | Performed by: INTERNAL MEDICINE

## 2019-05-20 PROCEDURE — 83735 ASSAY OF MAGNESIUM: CPT

## 2019-05-20 PROCEDURE — 85027 COMPLETE CBC AUTOMATED: CPT | Performed by: STUDENT IN AN ORGANIZED HEALTH CARE EDUCATION/TRAINING PROGRAM

## 2019-05-20 PROCEDURE — 83735 ASSAY OF MAGNESIUM: CPT | Performed by: STUDENT IN AN ORGANIZED HEALTH CARE EDUCATION/TRAINING PROGRAM

## 2019-05-20 PROCEDURE — 40000611 ZZHCL STATISTIC MORPHOLOGY W/INTERP HEMEPATH TC 85060

## 2019-05-20 PROCEDURE — 94640 AIRWAY INHALATION TREATMENT: CPT

## 2019-05-20 PROCEDURE — P9047 ALBUMIN (HUMAN), 25%, 50ML: HCPCS

## 2019-05-20 RX ORDER — ALBUMIN (HUMAN) 12.5 G/50ML
50 SOLUTION INTRAVENOUS EVERY 8 HOURS
Status: COMPLETED | OUTPATIENT
Start: 2019-05-20 | End: 2019-05-21

## 2019-05-20 RX ORDER — FUROSEMIDE 10 MG/ML
40 INJECTION INTRAMUSCULAR; INTRAVENOUS ONCE
Status: COMPLETED | OUTPATIENT
Start: 2019-05-20 | End: 2019-05-20

## 2019-05-20 RX ORDER — OLANZAPINE 10 MG/2ML
10 INJECTION, POWDER, FOR SOLUTION INTRAMUSCULAR ONCE
Status: DISCONTINUED | OUTPATIENT
Start: 2019-05-20 | End: 2019-05-20

## 2019-05-20 RX ORDER — FENTANYL CITRATE 50 UG/ML
50-100 INJECTION, SOLUTION INTRAMUSCULAR; INTRAVENOUS
Status: DISCONTINUED | OUTPATIENT
Start: 2019-05-20 | End: 2019-05-22

## 2019-05-20 RX ORDER — OLANZAPINE 10 MG/2ML
5 INJECTION, POWDER, FOR SOLUTION INTRAMUSCULAR ONCE
Status: DISCONTINUED | OUTPATIENT
Start: 2019-05-20 | End: 2019-05-20

## 2019-05-20 RX ADMIN — MIDAZOLAM 4 MG: 1 INJECTION INTRAMUSCULAR; INTRAVENOUS at 15:14

## 2019-05-20 RX ADMIN — PANTOPRAZOLE SODIUM 40 MG: 40 TABLET, DELAYED RELEASE ORAL at 08:30

## 2019-05-20 RX ADMIN — FENTANYL CITRATE 50 MCG: 50 INJECTION, SOLUTION INTRAMUSCULAR; INTRAVENOUS at 01:29

## 2019-05-20 RX ADMIN — FENTANYL CITRATE 100 MCG: 50 INJECTION, SOLUTION INTRAMUSCULAR; INTRAVENOUS at 17:06

## 2019-05-20 RX ADMIN — IPRATROPIUM BROMIDE AND ALBUTEROL SULFATE 3 ML: .5; 3 SOLUTION RESPIRATORY (INHALATION) at 20:18

## 2019-05-20 RX ADMIN — MIDAZOLAM 4 MG: 1 INJECTION INTRAMUSCULAR; INTRAVENOUS at 14:16

## 2019-05-20 RX ADMIN — ALBUMIN HUMAN 50 G: 0.25 SOLUTION INTRAVENOUS at 14:21

## 2019-05-20 RX ADMIN — FENTANYL CITRATE 100 MCG: 50 INJECTION, SOLUTION INTRAMUSCULAR; INTRAVENOUS at 00:20

## 2019-05-20 RX ADMIN — FOLIC ACID 1 MG: 1 TABLET ORAL at 08:30

## 2019-05-20 RX ADMIN — MIDAZOLAM 4 MG: 1 INJECTION INTRAMUSCULAR; INTRAVENOUS at 01:17

## 2019-05-20 RX ADMIN — ALBUMIN HUMAN 50 G: 0.25 SOLUTION INTRAVENOUS at 20:44

## 2019-05-20 RX ADMIN — IPRATROPIUM BROMIDE AND ALBUTEROL SULFATE 3 ML: .5; 3 SOLUTION RESPIRATORY (INHALATION) at 15:30

## 2019-05-20 RX ADMIN — HEPARIN SODIUM 1500 UNITS/HR: 10000 INJECTION, SOLUTION INTRAVENOUS at 22:03

## 2019-05-20 RX ADMIN — MULTIVITAMIN 15 ML: LIQUID ORAL at 08:30

## 2019-05-20 RX ADMIN — OLANZAPINE 10 MG: 5 TABLET, ORALLY DISINTEGRATING ORAL at 00:39

## 2019-05-20 RX ADMIN — FUROSEMIDE 40 MG: 10 INJECTION, SOLUTION INTRAVENOUS at 16:14

## 2019-05-20 RX ADMIN — MIDAZOLAM 8 MG/HR: 5 INJECTION INTRAMUSCULAR; INTRAVENOUS at 21:37

## 2019-05-20 RX ADMIN — CEFEPIME 2 G: 2 INJECTION, POWDER, FOR SOLUTION INTRAVENOUS at 11:41

## 2019-05-20 RX ADMIN — IPRATROPIUM BROMIDE AND ALBUTEROL SULFATE 3 ML: .5; 3 SOLUTION RESPIRATORY (INHALATION) at 11:45

## 2019-05-20 RX ADMIN — BISACODYL 10 MG: 10 SUPPOSITORY RECTAL at 04:04

## 2019-05-20 RX ADMIN — DEXMEDETOMIDINE 0.7 MCG/KG/HR: 100 INJECTION, SOLUTION, CONCENTRATE INTRAVENOUS at 05:00

## 2019-05-20 RX ADMIN — HEPARIN SODIUM 1350 UNITS/HR: 10000 INJECTION, SOLUTION INTRAVENOUS at 04:40

## 2019-05-20 RX ADMIN — FENTANYL CITRATE 100 MCG: 50 INJECTION, SOLUTION INTRAMUSCULAR; INTRAVENOUS at 15:56

## 2019-05-20 RX ADMIN — Medication 100 MG: at 08:30

## 2019-05-20 RX ADMIN — POTASSIUM CHLORIDE 20 MEQ: 1.5 POWDER, FOR SOLUTION ORAL at 06:47

## 2019-05-20 RX ADMIN — VANCOMYCIN HYDROCHLORIDE 1250 MG: 10 INJECTION, POWDER, LYOPHILIZED, FOR SOLUTION INTRAVENOUS at 08:15

## 2019-05-20 RX ADMIN — OLANZAPINE 10 MG: 10 TABLET, FILM COATED ORAL at 19:45

## 2019-05-20 RX ADMIN — MIDAZOLAM 4 MG: 1 INJECTION INTRAMUSCULAR; INTRAVENOUS at 16:53

## 2019-05-20 RX ADMIN — Medication 175 MCG/HR: at 01:23

## 2019-05-20 RX ADMIN — Medication 100 MCG/HR: at 17:10

## 2019-05-20 RX ADMIN — IPRATROPIUM BROMIDE AND ALBUTEROL SULFATE 3 ML: .5; 3 SOLUTION RESPIRATORY (INHALATION) at 07:57

## 2019-05-20 RX ADMIN — CEFEPIME 2 G: 2 INJECTION, POWDER, FOR SOLUTION INTRAVENOUS at 19:45

## 2019-05-20 RX ADMIN — MIDAZOLAM 8 MG/HR: 5 INJECTION INTRAMUSCULAR; INTRAVENOUS at 10:03

## 2019-05-20 RX ADMIN — LEVOTHYROXINE SODIUM 50 MCG: 25 TABLET ORAL at 08:30

## 2019-05-20 RX ADMIN — OLANZAPINE 10 MG: 10 TABLET, FILM COATED ORAL at 08:30

## 2019-05-20 RX ADMIN — MIDAZOLAM 2 MG: 1 INJECTION INTRAMUSCULAR; INTRAVENOUS at 00:20

## 2019-05-20 RX ADMIN — POLYETHYLENE GLYCOL 3350 17 G: 17 POWDER, FOR SOLUTION ORAL at 18:29

## 2019-05-20 RX ADMIN — SODIUM PHOSPHATE 1 ENEMA: 7; 19 ENEMA RECTAL at 15:43

## 2019-05-20 RX ADMIN — DEXMEDETOMIDINE 0.7 MCG/KG/HR: 100 INJECTION, SOLUTION, CONCENTRATE INTRAVENOUS at 18:38

## 2019-05-20 RX ADMIN — MIDAZOLAM 12 MG/HR: 5 INJECTION INTRAMUSCULAR; INTRAVENOUS at 01:23

## 2019-05-20 RX ADMIN — CEFEPIME 2 G: 2 INJECTION, POWDER, FOR SOLUTION INTRAVENOUS at 04:52

## 2019-05-20 RX ADMIN — MIDAZOLAM 4 MG: 1 INJECTION INTRAMUSCULAR; INTRAVENOUS at 18:23

## 2019-05-20 ASSESSMENT — ACTIVITIES OF DAILY LIVING (ADL)
ADLS_ACUITY_SCORE: 17
ADLS_ACUITY_SCORE: 16
ADLS_ACUITY_SCORE: 17
ADLS_ACUITY_SCORE: 17
ADLS_ACUITY_SCORE: 16
ADLS_ACUITY_SCORE: 17

## 2019-05-20 ASSESSMENT — MIFFLIN-ST. JEOR: SCORE: 1169.62

## 2019-05-20 NOTE — PROCEDURES
"Procedure/Surgery Information   [facility], [place]     Procedure Note  Date of Service (when I performed the procedure): 05/20/2019    Procedure: Left brachial a line    I have reviewed the lab findings, diagnostic data, medications, and the plan for procedure. I have determined this patient to be an appropriate candidate for the planned procedure and have reassessed the patient IMMEDIATELY PRIOR to ocedure.  Prior to the start of the procedure and with procedural staff participation, I verbally confirmed the patient s identity using two indicators, relevant allergies, that the procedure was appropriate and matched the consent or emergent situation, and that the correct equipment/implants were available. Immediately prior to starting the procedure I conducted the Time Out with the procedural staff and re-confirmed the patient s name, procedure, and site/side. (The Joint Commission universal protocol was followed.)  Yes    Insert arterial line  Date/Time: 5/20/2019 6:43 AM  Performed by: Millicent Cornelius MD  Authorized by: Millicent Cornelius MD   Consent: The procedure was performed in an emergent situation.  Site marked: the operative site was marked  Imaging studies: imaging studies available  Patient identity confirmed: arm band, provided demographic data and hospital-assigned identification number  Time out: Immediately prior to procedure a \"time out\" was called to verify the correct patient, procedure, equipment, support staff and site/side marked as required.  Preparation: Patient was prepped and draped in the usual sterile fashion.  Indications: multiple ABGs, respiratory failure and hemodynamic monitoring  Location: left brachial  Srinivas's test normal: yes  Needle gauge: 20  Seldinger technique: Seldinger technique used  Number of attempts: 1  Post-procedure: line sutured and dressing applied  Post-procedure CMS: normal  Patient tolerance: Patient tolerated the " procedure well with no immediate complications      Of note multiple attempts (3) were made to left radial artery prior to brachial.  Obtained brachial on one attempt via ultrasound.    Performed by: Millicent Cornelius  Authorized by: Millicent Cornelius

## 2019-05-20 NOTE — PROVIDER NOTIFICATION
Lab called with hgb 6.6, MD updated, currently transfusing 1 unit PRBC's.  ST elevation noted on smart disclosure lead V1 that is much more pronounced than on monitor.  MD updated, order received for 12 lead ECG.

## 2019-05-20 NOTE — PROGRESS NOTES
MICU Progress Note  Ana Laura Wharton MRN: [id number]  Age: 48 year old, : 1970  Date of Admission:2019  Primary care provider: Liborio Lincoln            Assessment and Plan  Ana Laura Wharton is a 48 year old female with a PMHx significant for recurrent UTIs, HTN, bipolar I, and anxiety/depression who was transferred from [facility] ED for medication overdose as a suicide attempt.  Remains intubated, sedated and back off pressors.  However with continued fevers and intermittent hemoglobin drops of unclear source.      Neurologic  # Concern for Anoxic Brain Injury: EEG without seizure activity.  MRI without abnormalities.Starting to have purposeful movements, low suspicion for anoxic brain injury.   - Monitor neuro status    Sedation:   Weaning versed and fentanyl as tolerated  Precedex    #Suicide attempt  #Bipolar I, anxiety, depression  - Psych consult when medically stable   - Holding PTA citalopram 20mg every day, mirtazapine 60mg QPM              Consider resuming pending psych recs  - Sitter once awake    #EtOH Abuse: Heavy drinking prior to admission, long history of EtOH abuse  - IV thiamine and folate    Cardiovascular  #Clonidine/carvedilol overdose:   #Hypotension, lactic acidosis:  Patient reported taking 180 tabs total of both Coreg 6.25mg and clonidine 0.1mg on t 7pm; d/w poison control; s/p 10mg Narcan x2 and insulin gtt, intralipid, norepi, epi, phenyl, vasopressin, dopamine, and angiotensin 2.  Able to decreased Epi significantly after starting AT2.  Overnight  received methylene blue and additional fluids.  Lactate normalized.   weaned off AT2 and down to Epi and Vaso.  Insulin gtt stopped overnight .  Echocardiograms during admission have shown normal EF. Now off pressors.      #Prolonged QTc  Patient presented w/ QTc of 507, decreased on recheck    Respiratory  #Acute hypoxic/hypercapnic respiratory failure requiring  intubation:  #Concern for DAD/DAH/ARDS:   #Bilateral pleural effusion: Has bilateral infiltrates but volume overload may present alterative cause or exacerbating factor.  S/p flolan stopped on 05/16/19.   Volume status improving however still likely contributing.  Suspect PNA contributing as well.  However with CT appearance and interval hgb drops and fevers I do maintain concern for DAH as possible uniting diagnosis.     - Working on decreasing vent support  - ABX as below    Gastrointestinal  #Elevated bilirubin: Direct predominant.  Suspect cholestasis. But cannot rule out element of hemolysis with interval hgb drops.   - Checking smear as below  - CTM    # Nutrition:   - Nutrition consulted for TF  - Speech eval when awake    #Elevated AST: Resolved  #Elevated ALT: Suspect was related to alcohol use  - CTM     #Ileus/constipation:  CT abd/pelvis w/ con showed large stool burden, ileus but could not r/o SBO, and a bilobed low-density lesion along the gastric fundus (unchanged from 7/2018), duplication cyst vs neoplasm.  PO gastrografin 5/13 without significant result, subsequently responded well to fleets enema.    - trial fleets again 5/20    #Pancreatitis: Resolved   #Hx of alcohol induced pancreatitis   Lipase 1,100; per chart review patient was c/o abdominal pain prior to intubation, CT abd/pelvis w/ con showed no pancreatitis.  Per friend was drinking heavily prior to admission.  Lipase normalized on 5/14 recheck.     Infectious Disease   # Concern for aspiration/CAP:   # Fever  UA not convincing for infection; Due to concern for aspiration completed ABX course with CTX, however again febrile 5/19, ABX resumed.  CT chest with multifocal primarily central opacities but procal not elevated.  Resumed abx but considering other etiologies.  Including drug, DAH, transfusion reaction?, etc.    - Resumed ABX with cefepime and vanc 5/19  - Repeat NGTD  - MRSA nares negative vanc stopped    Antibiotics:  Cefepime  5/11-12 5/19-present  Flagyl 5/11-12  Ceftriaxone 5/13-18  Vancomycin 5/19-5/20    Cultures:  MRSA nares 5/12 negative   Blood cultures: 5/11 NGTD, 5/15 NGTD   UC 5/11 >100k colonies coag - staph (susceptabilities pending)  UC  5/12 NGTD  Sputum 5/15: mixed mina    Hematology  #Right internal jugular thrombus: suspected line associated 2/2 attempted line placement at OSH.  Interval increase on repeat US 5/14.    - Heparin drip, high intensity, clot propagated without heparin on interval US.   Continued, but with care due to recurrent hgb drops    #Acute Normocytic Anemia:  Suspect initial drop was dilutional.  However interval drop 5/15-16 unclear cause.  No overt bleeding at this time.  Direct predominant hyperbilirubinemia.  But low suspicion for hemolysis.  Possibly still related to fluid shifts or lab fluctuation.  Will watch closely.  Some concern for DAH as above, although marrow suppression 2/2 illness not unlikely.  No sign of GIB currently.   Retic count hypo proliferative.    - Required transfusion x3  - Peripheral smear ordered  - Transfuse for hgb <7  - CTM    # Thrombocytopenia: Occurred in setting of massive fluid resuscitation.  Suspect largely dilutional, correlates with hgb.  Could have been component of marrow suppression as well.  HIT score was low.    - CTM    #Leukocytosis: Reactive vs infectious.  Resolved  - See ID section  - CTM    Endocrine  #Glucose  - CTM    #Hypothyroidism   - Cont Synthroid     Renal/Electolytes/FEN  #Oliguria and EDUARDO:  Resolved.   BUN and creatinine mildly elevated on presentation.  FENa was 0.1%.  Consistent with prerenal picture.      #Volume overload: 2/2 initial fluid resuscitation.  Producing large volumes of urine with intermittent diuresis, still volume up on exam and .    - Albumin assisted diuresis   - Strict I/O  - Napoles in   - Goal net negative 1-2L daily. Lasix as needed    #Hypernatremia: Resovled  Due to diuresis and salt load from tube feeds.   -  "Aggressively replaced water 5/17  - CTM    #Overactive bladder:  - Cont oxybutynin, hold mirabegron     #Frequent UTIs:  - Holding cipro 125mg every day for UTI ppx given prolonged QT   - Currently on CTX as above    ACID/BASE/ELECTROLYTES:   #Metabolic alkalosis:  - Could consider acetazolamide for bicarb diuresis if not improving?    #Electrolytes:  - On protocol   - CTM    Skin Care  #Pressure injury coccyx  - Turns per nursing    PPX:  DVT: Heparin as above. GI: PPI   FEN: TF per nutrition, NPO pending mental status  Consults: Neuro ICU, SW, Pharmacy  Tubes/Lines/Drains: Reviewed    CODE: FULL  Family: Ex  Gabriel was listed as emergency contact: [phone], boyfriend Nessa has been present at bedside [phone].  Mother is reportedly? estranged (per SO), but would like to be decision maker [phone].  Has 16 year old son who she has not spoken with and Brother Linden [phone]    Per SW consult.  Brother Linden will be decision maker for now, however mother has expressed desire to assume this role.  Complex psycho social back drop.  Mother has been grossly appropriate with staff thus far.  Appreciate  assistance in this matter.     Dispo: ICU pending improved neuro status, pressor and vent weaning.       Patient discussed with staff attending, Dr. Rogers.  Please feel free to page with questions.    Duane Hill MD   Internal Medicine PGY-1  Pager: [phone]         Interval History   NAOE.  Intermittently seems uncomfortable.  Intubated/Sedated.  SO at bedside.          Vitals  Vital signs:  Temp: 99.7  F (37.6  C) Temp src: Oral BP: (!) 88/55 Pulse: 84 Heart Rate: 84 Resp: 15 SpO2: 95 % O2 Device: Mechanical Ventilator   Height: 165.7 cm (5' 5.24\") Weight: 53.5 kg (117 lb 15.1 oz)  Estimated body mass index is 19.49 kg/m  as calculated from the following:    Height as of this encounter: 1.657 m (5' 5.24\").    Weight as of this encounter: 53.5 kg (117 lb 15.1 oz).        Ventilator  Ventilation Mode: " CMV/AC  (Continuous Mandatory Ventilation/ Assist Control)  FiO2 (%): 40 %  Rate Set (breaths/minute): 14 breaths/min  Tidal Volume Set (mL): 500 mL  PEEP (cm H2O): 8 cmH2O  Oxygen Concentration (%): 40 %  Resp: 15          Physical Exam    Gen: Intubated, continues to be intermittently reponsive   HEENT:AT/ NC, PERRL,  sclera anicteric  PULM/THORAX:Coarse breath sounds anteriorly  CV:RRR, S1 and S2 appreciated, no extra heart sounds, murmurs or rub auscultated  ABD: Soft, non distended, normal bowel sounds  EXT: Warm,  Pitting edema present in hands and feet  SKIN: Capillary refill normal  NEURO: Responding to tactile stim, moving all four ext.  PERRL.     Labs last 24 hours reviewed

## 2019-05-20 NOTE — PLAN OF CARE
I/A: Pt remains on sedation. Very restless throughout night and was requiring frequent boluses for versed and fentantyl - MICU notified, additional dose of zyprexa given. Pt currently on Versed at 10 mg/hr, Fentanyl at 150 mcg/hr, Precedex at 0.7 mcg/kg/min. Does not follow commands, PERRL. Remains intubated. CMV tv 500 Fio2 30% rr 14 peep 8. CXR pending. SR. Levophed off at 0040. Tmax 100.8 - tylenol given. TF at goal. No BM since 5/16. Dulcolax suppository given. Napoles in place. Potassium replaced.   P: Continue POC, ween vent settings as tolerated,

## 2019-05-21 LAB
ALBUMIN SERPL-MCNC: 3.8 G/DL (ref 3.4–5)
ALP SERPL-CCNC: 262 U/L (ref 40–150)
ALT SERPL W P-5'-P-CCNC: 17 U/L (ref 0–50)
ANION GAP SERPL CALCULATED.3IONS-SCNC: 4 MMOL/L (ref 3–14)
ANION GAP SERPL CALCULATED.3IONS-SCNC: 6 MMOL/L (ref 3–14)
ANION GAP SERPL CALCULATED.3IONS-SCNC: 6 MMOL/L (ref 3–14)
APPEARANCE FLD: NORMAL
AST SERPL W P-5'-P-CCNC: 41 U/L (ref 0–45)
BACTERIA SPEC CULT: NO GROWTH
BACTERIA SPEC CULT: NO GROWTH
BACTERIA SPEC CULT: NORMAL
BASE EXCESS BLDV CALC-SCNC: 6.6 MMOL/L
BILIRUB SERPL-MCNC: 1.7 MG/DL (ref 0.2–1.3)
BUN SERPL-MCNC: 14 MG/DL (ref 7–30)
BUN SERPL-MCNC: 15 MG/DL (ref 7–30)
BUN SERPL-MCNC: 20 MG/DL (ref 7–30)
CALCIUM SERPL-MCNC: 8.8 MG/DL (ref 8.5–10.1)
CALCIUM SERPL-MCNC: 8.9 MG/DL (ref 8.5–10.1)
CALCIUM SERPL-MCNC: 8.9 MG/DL (ref 8.5–10.1)
CHLORIDE SERPL-SCNC: 100 MMOL/L (ref 94–109)
CHLORIDE SERPL-SCNC: 101 MMOL/L (ref 94–109)
CHLORIDE SERPL-SCNC: 104 MMOL/L (ref 94–109)
CO2 SERPL-SCNC: 28 MMOL/L (ref 20–32)
CO2 SERPL-SCNC: 29 MMOL/L (ref 20–32)
CO2 SERPL-SCNC: 31 MMOL/L (ref 20–32)
COLOR FLD: NORMAL
COPATH REPORT: NORMAL
CREAT SERPL-MCNC: 0.58 MG/DL (ref 0.52–1.04)
CREAT SERPL-MCNC: 0.6 MG/DL (ref 0.52–1.04)
CREAT SERPL-MCNC: 0.6 MG/DL (ref 0.52–1.04)
CRP SERPL-MCNC: 39 MG/L (ref 0–8)
ERYTHROCYTE [DISTWIDTH] IN BLOOD BY AUTOMATED COUNT: 17.2 % (ref 10–15)
ERYTHROCYTE [DISTWIDTH] IN BLOOD BY AUTOMATED COUNT: 17.2 % (ref 10–15)
ERYTHROCYTE [SEDIMENTATION RATE] IN BLOOD BY WESTERGREN METHOD: 66 MM/H (ref 0–20)
GFR SERPL CREATININE-BSD FRML MDRD: >90 ML/MIN/{1.73_M2}
GLUCOSE BLDC GLUCOMTR-MCNC: 111 MG/DL (ref 70–99)
GLUCOSE SERPL-MCNC: 109 MG/DL (ref 70–99)
GLUCOSE SERPL-MCNC: 125 MG/DL (ref 70–99)
GLUCOSE SERPL-MCNC: 129 MG/DL (ref 70–99)
GRAM STN SPEC: NORMAL
HCO3 BLDV-SCNC: 31 MMOL/L (ref 21–28)
HCT VFR BLD AUTO: 25.5 % (ref 35–47)
HCT VFR BLD AUTO: 25.9 % (ref 35–47)
HGB BLD-MCNC: 8 G/DL (ref 11.7–15.7)
HGB BLD-MCNC: 8.4 G/DL (ref 11.7–15.7)
INTERPRETATION ECG - MUSE: NORMAL
LMWH PPP CHRO-ACNC: 0.68 IU/ML
LYMPHOCYTES NFR FLD MANUAL: 10 %
Lab: NORMAL
Lab: NORMAL
MAGNESIUM SERPL-MCNC: 2 MG/DL (ref 1.6–2.3)
MAGNESIUM SERPL-MCNC: 2.5 MG/DL (ref 1.6–2.3)
MAGNESIUM SERPL-MCNC: 2.8 MG/DL (ref 1.6–2.3)
MCH RBC QN AUTO: 30 PG (ref 26.5–33)
MCH RBC QN AUTO: 30.9 PG (ref 26.5–33)
MCHC RBC AUTO-ENTMCNC: 31.4 G/DL (ref 31.5–36.5)
MCHC RBC AUTO-ENTMCNC: 32.4 G/DL (ref 31.5–36.5)
MCV RBC AUTO: 95 FL (ref 78–100)
MCV RBC AUTO: 96 FL (ref 78–100)
NEUTS BAND NFR FLD MANUAL: 7 %
O2/TOTAL GAS SETTING VFR VENT: 45 %
OTHER CELLS FLD MANUAL: 83 %
OXYHGB MFR BLDV: 64 %
PCO2 BLDV: 45 MM HG (ref 40–50)
PH BLDV: 7.45 PH (ref 7.32–7.43)
PHOSPHATE SERPL-MCNC: 3.4 MG/DL (ref 2.5–4.5)
PHOSPHATE SERPL-MCNC: 4.3 MG/DL (ref 2.5–4.5)
PLATELET # BLD AUTO: 279 10E9/L (ref 150–450)
PLATELET # BLD AUTO: 297 10E9/L (ref 150–450)
PO2 BLDV: 34 MM HG (ref 25–47)
POTASSIUM SERPL-SCNC: 3.7 MMOL/L (ref 3.4–5.3)
POTASSIUM SERPL-SCNC: 4.1 MMOL/L (ref 3.4–5.3)
POTASSIUM SERPL-SCNC: 4.2 MMOL/L (ref 3.4–5.3)
PROT SERPL-MCNC: 7.6 G/DL (ref 6.8–8.8)
RBC # BLD AUTO: 2.67 10E12/L (ref 3.8–5.2)
RBC # BLD AUTO: 2.72 10E12/L (ref 3.8–5.2)
SODIUM SERPL-SCNC: 136 MMOL/L (ref 133–144)
SODIUM SERPL-SCNC: 136 MMOL/L (ref 133–144)
SODIUM SERPL-SCNC: 138 MMOL/L (ref 133–144)
SPECIMEN SOURCE FLD: NORMAL
SPECIMEN SOURCE: NORMAL
WBC # BLD AUTO: 10.6 10E9/L (ref 4–11)
WBC # BLD AUTO: 12.6 10E9/L (ref 4–11)
WBC # FLD AUTO: NORMAL /UL

## 2019-05-21 PROCEDURE — 86140 C-REACTIVE PROTEIN: CPT | Performed by: STUDENT IN AN ORGANIZED HEALTH CARE EDUCATION/TRAINING PROGRAM

## 2019-05-21 PROCEDURE — P9047 ALBUMIN (HUMAN), 25%, 50ML: HCPCS

## 2019-05-21 PROCEDURE — 25000132 ZZH RX MED GY IP 250 OP 250 PS 637

## 2019-05-21 PROCEDURE — 31624 DX BRONCHOSCOPE/LAVAGE: CPT | Mod: GC | Performed by: INTERNAL MEDICINE

## 2019-05-21 PROCEDURE — 25000128 H RX IP 250 OP 636: Performed by: STUDENT IN AN ORGANIZED HEALTH CARE EDUCATION/TRAINING PROGRAM

## 2019-05-21 PROCEDURE — 25000132 ZZH RX MED GY IP 250 OP 250 PS 637: Performed by: STUDENT IN AN ORGANIZED HEALTH CARE EDUCATION/TRAINING PROGRAM

## 2019-05-21 PROCEDURE — 25000125 ZZHC RX 250: Performed by: STUDENT IN AN ORGANIZED HEALTH CARE EDUCATION/TRAINING PROGRAM

## 2019-05-21 PROCEDURE — 40000275 ZZH STATISTIC RCP TIME EA 10 MIN

## 2019-05-21 PROCEDURE — 0B9H8ZX DRAINAGE OF LUNG LINGULA, VIA NATURAL OR ARTIFICIAL OPENING ENDOSCOPIC, DIAGNOSTIC: ICD-10-PCS | Performed by: INTERNAL MEDICINE

## 2019-05-21 PROCEDURE — 99291 CRITICAL CARE FIRST HOUR: CPT | Mod: 25 | Performed by: INTERNAL MEDICINE

## 2019-05-21 PROCEDURE — 80048 BASIC METABOLIC PNL TOTAL CA: CPT | Performed by: INTERNAL MEDICINE

## 2019-05-21 PROCEDURE — 94640 AIRWAY INHALATION TREATMENT: CPT

## 2019-05-21 PROCEDURE — 89051 BODY FLUID CELL COUNT: CPT | Performed by: EMERGENCY MEDICINE

## 2019-05-21 PROCEDURE — 82805 BLOOD GASES W/O2 SATURATION: CPT | Performed by: STUDENT IN AN ORGANIZED HEALTH CARE EDUCATION/TRAINING PROGRAM

## 2019-05-21 PROCEDURE — 85027 COMPLETE CBC AUTOMATED: CPT | Performed by: INTERNAL MEDICINE

## 2019-05-21 PROCEDURE — 31624 DX BRONCHOSCOPE/LAVAGE: CPT

## 2019-05-21 PROCEDURE — 20000004 ZZH R&B ICU UMMC

## 2019-05-21 PROCEDURE — 87205 SMEAR GRAM STAIN: CPT | Performed by: EMERGENCY MEDICINE

## 2019-05-21 PROCEDURE — 87070 CULTURE OTHR SPECIMN AEROBIC: CPT | Performed by: EMERGENCY MEDICINE

## 2019-05-21 PROCEDURE — 83735 ASSAY OF MAGNESIUM: CPT | Performed by: INTERNAL MEDICINE

## 2019-05-21 PROCEDURE — 27210429 ZZH NUTRITION PRODUCT INTERMEDIATE LITER

## 2019-05-21 PROCEDURE — 85652 RBC SED RATE AUTOMATED: CPT | Performed by: STUDENT IN AN ORGANIZED HEALTH CARE EDUCATION/TRAINING PROGRAM

## 2019-05-21 PROCEDURE — 00000146 ZZHCL STATISTIC GLUCOSE BY METER IP

## 2019-05-21 PROCEDURE — 94640 AIRWAY INHALATION TREATMENT: CPT | Mod: 76

## 2019-05-21 PROCEDURE — 85520 HEPARIN ASSAY: CPT | Performed by: STUDENT IN AN ORGANIZED HEALTH CARE EDUCATION/TRAINING PROGRAM

## 2019-05-21 PROCEDURE — 84100 ASSAY OF PHOSPHORUS: CPT | Performed by: INTERNAL MEDICINE

## 2019-05-21 PROCEDURE — 25000128 H RX IP 250 OP 636

## 2019-05-21 PROCEDURE — 87015 SPECIMEN INFECT AGNT CONCNTJ: CPT | Performed by: EMERGENCY MEDICINE

## 2019-05-21 PROCEDURE — 85027 COMPLETE CBC AUTOMATED: CPT | Performed by: STUDENT IN AN ORGANIZED HEALTH CARE EDUCATION/TRAINING PROGRAM

## 2019-05-21 PROCEDURE — 25800030 ZZH RX IP 258 OP 636: Performed by: STUDENT IN AN ORGANIZED HEALTH CARE EDUCATION/TRAINING PROGRAM

## 2019-05-21 PROCEDURE — 80053 COMPREHEN METABOLIC PANEL: CPT | Performed by: STUDENT IN AN ORGANIZED HEALTH CARE EDUCATION/TRAINING PROGRAM

## 2019-05-21 PROCEDURE — 84100 ASSAY OF PHOSPHORUS: CPT | Performed by: STUDENT IN AN ORGANIZED HEALTH CARE EDUCATION/TRAINING PROGRAM

## 2019-05-21 PROCEDURE — 25000125 ZZHC RX 250

## 2019-05-21 PROCEDURE — 83735 ASSAY OF MAGNESIUM: CPT | Performed by: STUDENT IN AN ORGANIZED HEALTH CARE EDUCATION/TRAINING PROGRAM

## 2019-05-21 PROCEDURE — 94003 VENT MGMT INPAT SUBQ DAY: CPT

## 2019-05-21 PROCEDURE — 25000132 ZZH RX MED GY IP 250 OP 250 PS 637: Performed by: ANESTHESIOLOGY

## 2019-05-21 RX ORDER — MAGNESIUM CARB/ALUMINUM HYDROX 105-160MG
296 TABLET,CHEWABLE ORAL ONCE
Status: COMPLETED | OUTPATIENT
Start: 2019-05-21 | End: 2019-05-21

## 2019-05-21 RX ORDER — PROPOFOL 10 MG/ML
5-50 INJECTION, EMULSION INTRAVENOUS CONTINUOUS
Status: DISCONTINUED | OUTPATIENT
Start: 2019-05-21 | End: 2019-05-22

## 2019-05-21 RX ORDER — PROPOFOL 10 MG/ML
INJECTION, EMULSION INTRAVENOUS
Status: DISCONTINUED
Start: 2019-05-21 | End: 2019-05-22 | Stop reason: HOSPADM

## 2019-05-21 RX ORDER — PROPOFOL 10 MG/ML
40 INJECTION, EMULSION INTRAVENOUS ONCE
Status: COMPLETED | OUTPATIENT
Start: 2019-05-21 | End: 2019-05-21

## 2019-05-21 RX ORDER — LIDOCAINE HYDROCHLORIDE 10 MG/ML
INJECTION, SOLUTION EPIDURAL; INFILTRATION; INTRACAUDAL; PERINEURAL
Status: COMPLETED
Start: 2019-05-21 | End: 2019-05-21

## 2019-05-21 RX ORDER — FUROSEMIDE 10 MG/ML
40 INJECTION INTRAMUSCULAR; INTRAVENOUS ONCE
Status: COMPLETED | OUTPATIENT
Start: 2019-05-21 | End: 2019-05-21

## 2019-05-21 RX ADMIN — LIDOCAINE HYDROCHLORIDE 200 MG: 10 INJECTION, SOLUTION EPIDURAL; INFILTRATION; INTRACAUDAL; PERINEURAL at 14:45

## 2019-05-21 RX ADMIN — POLYETHYLENE GLYCOL 3350 17 G: 17 POWDER, FOR SOLUTION ORAL at 08:26

## 2019-05-21 RX ADMIN — PROPOFOL 10 MCG/KG/MIN: 10 INJECTION, EMULSION INTRAVENOUS at 17:16

## 2019-05-21 RX ADMIN — MIDAZOLAM 2 MG: 1 INJECTION INTRAMUSCULAR; INTRAVENOUS at 04:00

## 2019-05-21 RX ADMIN — MIDAZOLAM 4 MG: 1 INJECTION INTRAMUSCULAR; INTRAVENOUS at 16:27

## 2019-05-21 RX ADMIN — IPRATROPIUM BROMIDE AND ALBUTEROL SULFATE 3 ML: .5; 3 SOLUTION RESPIRATORY (INHALATION) at 19:37

## 2019-05-21 RX ADMIN — LEVOTHYROXINE SODIUM 50 MCG: 25 TABLET ORAL at 08:26

## 2019-05-21 RX ADMIN — PROPOFOL 50 MCG/KG/MIN: 10 INJECTION, EMULSION INTRAVENOUS at 23:54

## 2019-05-21 RX ADMIN — OLANZAPINE 10 MG: 10 TABLET, FILM COATED ORAL at 08:26

## 2019-05-21 RX ADMIN — MIDAZOLAM 6 MG/HR: 5 INJECTION INTRAMUSCULAR; INTRAVENOUS at 12:07

## 2019-05-21 RX ADMIN — POTASSIUM CHLORIDE 20 MEQ: 1.5 POWDER, FOR SOLUTION ORAL at 01:14

## 2019-05-21 RX ADMIN — FOLIC ACID 1 MG: 1 TABLET ORAL at 08:26

## 2019-05-21 RX ADMIN — FENTANYL CITRATE 50 MCG: 50 INJECTION, SOLUTION INTRAMUSCULAR; INTRAVENOUS at 04:00

## 2019-05-21 RX ADMIN — CEFEPIME 2 G: 2 INJECTION, POWDER, FOR SOLUTION INTRAVENOUS at 12:06

## 2019-05-21 RX ADMIN — SENNOSIDES AND DOCUSATE SODIUM 1 TABLET: 8.6; 5 TABLET ORAL at 08:26

## 2019-05-21 RX ADMIN — OLANZAPINE 10 MG: 10 TABLET, FILM COATED ORAL at 19:56

## 2019-05-21 RX ADMIN — MIDAZOLAM 2 MG: 1 INJECTION INTRAMUSCULAR; INTRAVENOUS at 02:13

## 2019-05-21 RX ADMIN — IPRATROPIUM BROMIDE AND ALBUTEROL SULFATE 3 ML: .5; 3 SOLUTION RESPIRATORY (INHALATION) at 12:24

## 2019-05-21 RX ADMIN — Medication 100 MG: at 08:26

## 2019-05-21 RX ADMIN — FENTANYL CITRATE 50 MCG: 50 INJECTION, SOLUTION INTRAMUSCULAR; INTRAVENOUS at 12:00

## 2019-05-21 RX ADMIN — MULTIVITAMIN 15 ML: LIQUID ORAL at 08:26

## 2019-05-21 RX ADMIN — FUROSEMIDE 40 MG: 10 INJECTION, SOLUTION INTRAVENOUS at 15:51

## 2019-05-21 RX ADMIN — PROPOFOL 40 MG: 10 INJECTION, EMULSION INTRAVENOUS at 14:15

## 2019-05-21 RX ADMIN — CEFEPIME 2 G: 2 INJECTION, POWDER, FOR SOLUTION INTRAVENOUS at 19:52

## 2019-05-21 RX ADMIN — CEFEPIME 2 G: 2 INJECTION, POWDER, FOR SOLUTION INTRAVENOUS at 05:29

## 2019-05-21 RX ADMIN — FENTANYL CITRATE 50 MCG: 50 INJECTION, SOLUTION INTRAMUSCULAR; INTRAVENOUS at 13:29

## 2019-05-21 RX ADMIN — DEXMEDETOMIDINE 1.2 MCG/KG/HR: 100 INJECTION, SOLUTION, CONCENTRATE INTRAVENOUS at 18:05

## 2019-05-21 RX ADMIN — MIDAZOLAM 4 MG: 1 INJECTION INTRAMUSCULAR; INTRAVENOUS at 19:55

## 2019-05-21 RX ADMIN — Medication 100 MCG/HR: at 18:05

## 2019-05-21 RX ADMIN — SODIUM PHOSPHATE 1 ENEMA: 7; 19 ENEMA RECTAL at 14:45

## 2019-05-21 RX ADMIN — Medication 2 G: at 01:14

## 2019-05-21 RX ADMIN — MIDAZOLAM 4 MG: 1 INJECTION INTRAMUSCULAR; INTRAVENOUS at 14:30

## 2019-05-21 RX ADMIN — DEXMEDETOMIDINE 0.7 MCG/KG/HR: 100 INJECTION, SOLUTION, CONCENTRATE INTRAVENOUS at 06:56

## 2019-05-21 RX ADMIN — ALBUMIN HUMAN 50 G: 0.25 SOLUTION INTRAVENOUS at 05:57

## 2019-05-21 RX ADMIN — IPRATROPIUM BROMIDE AND ALBUTEROL SULFATE 3 ML: .5; 3 SOLUTION RESPIRATORY (INHALATION) at 16:10

## 2019-05-21 RX ADMIN — FENTANYL CITRATE 50 MCG: 50 INJECTION, SOLUTION INTRAMUSCULAR; INTRAVENOUS at 09:43

## 2019-05-21 RX ADMIN — MAGNESIUM CITRATE 296 ML: 1.75 LIQUID ORAL at 14:45

## 2019-05-21 RX ADMIN — IPRATROPIUM BROMIDE AND ALBUTEROL SULFATE 3 ML: .5; 3 SOLUTION RESPIRATORY (INHALATION) at 08:10

## 2019-05-21 RX ADMIN — PANTOPRAZOLE SODIUM 40 MG: 40 TABLET, DELAYED RELEASE ORAL at 08:31

## 2019-05-21 ASSESSMENT — MIFFLIN-ST. JEOR: SCORE: 1183.62

## 2019-05-21 ASSESSMENT — ACTIVITIES OF DAILY LIVING (ADL)
ADLS_ACUITY_SCORE: 17

## 2019-05-21 NOTE — PROGRESS NOTES
"Bronchoscopy Risk Assessment Guidelines      A. Patient symptoms to consider when assessing pulmonary TB risk are:    I. Cough greater than 3 weeks; and fever, hemoptysis, pleuritic chest    pain, weight loss greater than 10 lbs, night sweats, fatigue, infiltrates on    upper lobes or superior segments of lower lobes, cavitation on chest    x-ray.   B. Patient risk factors to consider when assessing pulmonary TB risk are:    I. Exposure to known TB case, foreign-born persons (within 5 years of    arrival to [place]), residence in a crowded setting (correctional facility,     long-term care center, etc.), persons with HIV or immunosuppression.    Patients with symptoms and risk factors should generally be considered \"suspect risk\" and bronchoscopies should be performed in airborne precautions.    This patient has NO KNOWN RISK of Tuberculosis (proceed with bronchoscopy)    Specimens sent: yes  Complications: None  Scope used: #[id number] Adult  Attending Physician: Dr.Burton PINO ROMERO, RT on 5/21/2019 at 3:28 PM  "

## 2019-05-21 NOTE — PROGRESS NOTES
Service Date: 05/06/2019      PSYCHIATRY CLINIC PROGRESS NOTE      The patient returns for medication management and supportive therapy.      Interim History:      Since the last visit, Overwhelmed by panic and anxiety. Severely down. Relationship with son not going well. Some cycling but mostly low.      RECENT SYMPTOMS:      Depression:   Suicidal ideation: yes   Depressed mood: severe   Anhedonia: yes   Low energy: yes   Insomnia: broken sleep   Appetite change: poor   Excessive guilt: yes      Elevated Mood:  no manic symptoms      Psychosis:  no      Panic Attacks:  yes. Morning is worse      Anxiety:     Excessive worry: yes   Feeling fearful: yes   Compulsions: no   Obsessions: no   Nervous/tense: yes /yes   Overwhelmed: yes      Trauma-Related Symptoms:  no      ADVERSE EFFECTS:  dry mouth      MEDICAL CONCERNS:  sore throat and fever today of 101 degrees.      SUBSTANCE USE:  no      SOCIAL/FAMILY HISTORY:  unchanged from last visit.      MEDICAL/SURGICAL HISTORY:  See EMR medical problems list.      MEDICAL REVIEW OF SYSTEMS:  A comprehensive review of systems was performed and is negative other than as noted in the HPI.      ALLERGIES:  none new      CURRENT MEDICATIONS:  See EMR med sections.      VITALS:  See rooming note.      MENTAL STATUS EXAM:     Alertness: yes   Appearance: well groomed   Behavior/demeanor: cooperative   Speech: nl rate and flow; soft   Psychomotor: nl   Mood: sad   Affect: very tearful   Thought process/associations: logical   Thought content: frequent and passive SI   Perception: no psychosis   Insight: fair   Judgement: fair   Cognition:   Oriented: x4   Attention span: adequate   Concentration: adequate   Recent memory: intact   Remote memory: intact   Fund of knowledge: good   Gait and station: nl      Labs and Data:  PHQ9=20      DIAGNOSIS AND ASSESSMENT:  Bipolar I currently depressed. Alcohol use disorder - sober x 5 days      PLAN:   1. Medications:  Increase Citalopram  to 20mg watch for emerging linda. Consider Vraylar if becomes manic.    2. RTC:  1 week   3. Other:  referred to [facility] clinic. She will call insurance to see if day treatment is covered and if they provide rides.    4. Crisis numbers:  Provided routinely in AVS.      Treatment Risk Statement: The patient understands the risks, benefits, adverse effects, and alternatives. Agrees to treatment with the capacity to do so. No medical contraindications to treatment. Agrees to call clinic for any problems. The patient understands to call 911 or come to the nearest ED is life-threatening or urgent symptoms present.         PIEDAD SADLER MD             D: 2019   T: 2019   MT: CAITLYN      Name:     KEE TINEO   MRN:      [id number]        Account:      [id number]   :      1970           Service Date: 2019      Document: [id number]

## 2019-05-21 NOTE — PROGRESS NOTES
MICU Progress Note  Ana Laura Wharton MRN: [id number]  Age: 48 year old, : 1970  Date of Admission:2019  Primary care provider: Liborio Lincoln            Assessment and Plan  Ana Laura Wharton is a 48 year old female with a PMHx significant for recurrent UTIs, HTN, bipolar I, and anxiety/depression who remains in the ICU after intentional medication overdose.  She remains intubated, sedated, with continued fevers and intermittent hemoglobin drops of unclear source.      Neurologic  # Concern for Anoxic Brain Injury: EEG without seizure activity.  MRI without abnormalities.Starting to have purposeful movements, low suspicion for anoxic brain injury.   - Monitor neuro status    Sedation:   Weaning versed and fentanyl as tolerated  Precedex    #Suicide attempt  #Bipolar I, anxiety, depression  - Psych consult when medically stable   - Holding PTA citalopram 20mg every day, mirtazapine 60mg QPM              Consider resuming pending psych recs  - Sitter once awake    #EtOH Abuse: Heavy drinking prior to admission, long history of EtOH abuse  - IV thiamine and folate    Cardiovascular  #Clonidine/carvedilol overdose:   #Hypotension, lactic acidosis:  Patient reported taking 180 tabs total of both Coreg 6.25mg and clonidine 0.1mg on t 7pm; d/w poison control; s/p 10mg Narcan x2 and insulin gtt, intralipid, norepi, epi, phenyl, vasopressin, dopamine, and angiotensin 2.  Able to decreased Epi significantly after starting AT2.  Overnight  received methylene blue and additional fluids.  Lactate normalized.   weaned off AT2 and down to Epi and Vaso.  Insulin gtt stopped overnight .  Echocardiograms during admission have shown normal EF. Now off pressors.      #Prolonged QTc  Patient presented w/ QTc of 507, decreased on recheck    Respiratory  #Acute hypoxic/hypercapnic respiratory failure requiring intubation:  #Concern for DAD/DAH/ARDS:   #Bilateral pleural  effusion: Has bilateral infiltrates but volume overload may present alterative cause or exacerbating factor. Volume status improving however still likely contributing.  Some concern for PNA with fevers, although procal not increasing.  And CT appearance and interval hgb drops and fevers I do maintain concern for DAH as possible uniting diagnosis.     - Working on decreasing vent support  - ABX as below  - Planning on bronch today    Gastrointestinal  #Elevated bilirubin: Direct predominant.  Suspect cholestasis. Low suspicion for hemolysis.    - Checking smear as below  - CTM    # Nutrition:   - Nutrition consulted for TF  - Speech eval when extubated    #Elevated AST: Resolved  #Elevated ALT: Suspect was related to alcohol use  - CTM     #Ileus/constipation:  CT abd/pelvis w/ con showed large stool burden, PO gastrografin 5/13 without significant result, subsequently responded well to fleets enema, but now again with minimal stool output.    - Continuing bowel regimen as needed    #Pancreatitis: Resolved   #Hx of alcohol induced pancreatitis   Lipase 1,100; per chart review patient was c/o abdominal pain prior to intubation, CT abd/pelvis w/ con showed no pancreatitis.  Per friend was drinking heavily prior to admission.  Lipase normalized on 5/14 recheck.     Infectious Disease   # Concern for aspiration/CAP:   # Fever  UA not convincing for infection; Due to concern for aspiration completed ABX course with CTX, however again febrile 5/19, ABX resumed.  CT chest with multifocal primarily central opacities but procal not elevated.  Resumed abx but considering other etiologies.  Including drug, DAH, transfusion reaction?, etc.  However bronch underwhelming for DAH today.  - Continue cefepime   - Follow up bronch labs    Antibiotics:  Cefepime 5/11-12 5/19-present  Flagyl 5/11-12  Ceftriaxone 5/13-18  Vancomycin 5/19-5/20    Cultures:  MRSA nares 5/12 negative, 5/19 negative  Blood cultures: 5/11 NGTD, 5/15 NGTD,  5/19 NGTD  UC 5/11 >100k colonies coag - staph (susceptabilities pending)  UC  5/12 NGTD  Sputum 5/15: mixed mina 5/19 NGTD    Hematology  #Right internal jugular thrombus: suspected line associated 2/2 attempted line placement at OSH.  Interval increase on repeat US 5/14.  Started on heparin for this.  But may stop if DAH confirmed.    - Heparin drip, high intensity, for now    #Acute Normocytic Anemia:  Suspect initial drop was likely dilutional.  However interval drop 5/15-16 unclear cause.  No overt bleeding at this time.  Direct predominant hyperbilirubinemia.  Low suspicion for hemolysis.  Possibly still related to fluid shifts or lab fluctuation.  Some concern for DAH as above, although marrow suppression 2/2 illness not unlikely (retic index hypo proliferative) and repeated blood draws could be to blame. No sign of GIB currently.     - Required transfusion x3  - Peripheral smear pending  - Transfuse for hgb <7  - CTM    #Leukocytosis: Mild.  Reactive vs infectious.  Stress due to incomplete sedation?  - See ID section  - CTM    # Thrombocytopenia: Resolved  Occurred in setting of massive fluid resuscitation.  Suspect largely dilutional, correlates with hgb.  Could have been component of marrow suppression as well.  HIT score was low.      Endocrine  #Glucose  - CTM    #Hypothyroidism   - Cont Synthroid     Renal/Electolytes/FEN  #Volume overload: 2/2 initial fluid resuscitation.  Producing large volumes of urine with intermittent diuresis, still volume up on exam and .    - Albumin assisted diuresis   - Strict I/O  - Napoles in   - Goal net negative 1-2L daily. Lasix as needed    #Oliguria and EDUARDO:  Resolved.   BUN and creatinine mildly elevated on presentation.  FENa was 0.1%.  Consistent with prerenal picture.     #Hypernatremia: Resovled  Due to diuresis and salt load from tube feeds.   - Aggressively replaced water 5/17  - CTM    #Hx overactive bladder:  - Cont oxybutynin, hold mirabegron     #Hx frequent  "UTIs:  - Holding cipro 125mg every day for UTI ppx given prolonged QT   - Currently on CTX as above    ACID/BASE/ELECTROLYTES:   #Metabolic alkalosis:  - CTM    #Electrolytes:  - On protocol   - CTM    Skin Care  #Pressure injury coccyx  - Turns per nursing    PPX:  DVT: Heparin as above. GI: PPI   FEN: TF per nutrition, NPO pending mental status  Consults: Neuro ICU, SW, Pharmacy  Tubes/Lines/Drains: Reviewed    CODE: FULL  Family: Ex  Gabriel was listed as emergency contact: [phone], boyfriend Nessa has been present at bedside [phone].  Mother is reportedly? estranged (per SO), but would like to be decision maker [phone].  Has 16 year old son who she has not spoken with and Brother Linden [phone]    Per SW consult.  Brother Linden will be decision maker for now, however mother has expressed desire to assume this role.  Complex psycho social back drop.  Mother has been grossly appropriate with staff thus far.  Appreciate SW assistance in this matter.     Dispo: ICU pending improved neuro status, pressor and vent weaning.       Patient discussed with staff attending, Dr. Rogers.  Please feel free to page with questions.    Duane Hill MD   Internal Medicine PGY-1  Pager: [phone]         Interval History   NAOE.  Agitated with cares.  Thick secretions.  Large volume urine output with lasix.  Bronch today.          Vitals  Vital signs:  Temp: 99  F (37.2  C) Temp src: Axillary BP: 112/74 Pulse: 95 Heart Rate: 96 Resp: 22 SpO2: 98 % O2 Device: Mechanical Ventilator   Height: 165.7 cm (5' 5.24\") Weight: 54.9 kg (121 lb 0.5 oz)  Estimated body mass index is 20 kg/m  as calculated from the following:    Height as of this encounter: 1.657 m (5' 5.24\").    Weight as of this encounter: 54.9 kg (121 lb 0.5 oz).        Ventilator  Ventilation Mode: CMV/AC  (Continuous Mandatory Ventilation/ Assist Control)  FiO2 (%): 40 %  Rate Set (breaths/minute): 14 breaths/min  Tidal Volume Set (mL): 500 mL  PEEP (cm " H2O): 8 cmH2O  Oxygen Concentration (%): 45 %  Resp: 22        Physical Exam    Gen: Intubated, continues to be intermittently reponsive   HEENT:AT/ NC, PERRL,  sclera anicteric  PULM/THORAX:Coarse breath sounds anteriorly  CV:RRR, S1 and S2 appreciated, no extra heart sounds, murmurs or rub auscultated  ABD: Soft, non distended, normal bowel sounds  EXT: Warm,  Pitting edema present in hands and feet  SKIN: Capillary refill normal  NEURO: Responding to tactile stim, moving all four ext.  PERRL.     Labs last 24 hours reviewed

## 2019-05-21 NOTE — PROCEDURES
Bronchoscopy    PROCEDURE:   Bronchoscopy with bronchoalveolar lavage    INDICATION:  Pulmonary infiltrates, r/o diffuse alveolar hemorrhage    PROCEDURE :   Nghia Godwin MD    SUPERVISOR:  Dr Rogers    CONSENT:  Obtained and in the paper chart    UNIVERSAL PROTOCOL: Patient Identification was verified, time out was performed. Imaging data reviewed. Barrier precaution done: Hands washed, mask, gloves, gown, and eye protection all used.     MEDICATIONS: Versed, fentanyl, propofol    PROCEDURE: Patient monitored during entire procedure.1% lidocaine instilled via ET tube with minimal cough.  Flexible bronchoscope was inserted through patients ET tube.  The ET tube was in adequate position.  The coleman was sharp.  An airway inspection was done to the subsegmental level which found no mucosal ulcerations, mucous or debris.  The bronchoscope was then advanced to the lingula and placed into wedge.  A BAL was performed where 120 mL of saline were instilled in 40 mL aliquots.  A total of 50 mL were collected.     SAMPLES:   50 mL of  fluid were sent for analysis. Fluid was non-bloody.    IMPRESSION:  Successful BAL of the lingula, lavage fluid non-bloody and findings not consistent with DAH.    Dr Rogers was present for the procedure.    COMPLICATIONS: No apparent    Nghia Godwin  Critical Care Fellow

## 2019-05-21 NOTE — PLAN OF CARE
D/I: Pt restless most of the night and BARBOUR.  Productive cough with moderate secretions.  Good UO.  Prn versed and fentanyl x2 for increased agitation.  A/P: Pt remains on versed, fentanyl and precedex with minimal prns needed overnight.  Pt restless/fidgety, but minimally agitated.  Occasionally redirectable.  Following commands inconsistently, but opening eyes to voice.  1 mucoid BM.  K/Mg replaced.  FWF stopped, . Weaned fio2 from 60-40%.  Continue with plan of care.

## 2019-05-21 NOTE — PLAN OF CARE
Unable to wean FIO2 today.  Ana Laura coughs frequently and produces thick yellow sputum.  Sedation weaned today.  Ana Laura gets agitated with any stimulation and has coughing fits on the ventilator.  PRN bumps of versed and fentanyl given with relief.      Napoles not removed due to ongoing diureses and predicted inability to achieve accurate I &O with external purwick due to frequent agitation and flailing of extremities.  Ana Laura also produces around 7 liters of urine per 24hrs.  Cleaning up 7L of urine from the bed would cause significant agitation for Ana Laura and would delay the weaning of her sedation and extubation.      Mirilax and a Fleet saline enema given this shift.  Only a smear of stool produced so far.  Gastric residuals remain small and her bowel sounds are active.      Smart disclosure print out showed significant ST elevation.  12 lead ECG performed that showed no ST elevation.      Problem: Adult Inpatient Plan of Care  Goal: Optimal Comfort and Wellbeing  Outcome: Declining     Problem: Adult Inpatient Plan of Care  Goal: Readiness for Transition of Care  Outcome: Improving

## 2019-05-21 NOTE — PROGRESS NOTES
CLINICAL NUTRITION SERVICES - REASSESSMENT NOTE     Nutrition Prescription    RECOMMENDATIONS FOR MDs/PROVIDERS TO ORDER:  Consider placement of small bore FT for enteral access post extubation. It would be ideal to have this placed prior to extubation.    Malnutrition Status:    Severe malnutrition in the context of chronic illness.          EVALUATION OF THE PROGRESS TOWARD GOALS   Diet: NPO  Nutrition Support: Nutren 1.5 @ 45 mL/hr via OGT to provide 1620 kcals (33 kcal/kg/day), 73 g PRO (1.5 g/kg/day), 821 mL H2O, 190 g CHO and no Fiber daily.  Intake: TF were started on 5/16. Pt received goal TF volumes daily over the past 3 days.       NEW FINDINGS   Diuresing well, weight is down 13 kg over the past 7 days. This is a weight loss of 19%. Continued loss of lean body mass is noted today.    CRP is high at 39. Lipase is now WNL at 161.    MALNUTRITION  % Intake: Decreased intake does not meet criteria  % Weight Loss: > 15% in 1 week   Subcutaneous Fat Loss: None observed  Muscle Loss: Scapular bone: moderate, Thoracic region (clavicle, acromium bone, deltoid, trapezius, pectoral): moderate, Upper arm (bicep, tricep): severe, Lower arm  (forearm): moderate, Dorsal hand: mild, Upper leg (quadricep, hamstring): severe, Patellar region: moderate and Posterior calf:  severe  Fluid Accumulation/Edema: Mild  Malnutrition Diagnosis: Severe malnutrition in the context of chronic illness.     Previous Goals   Total avg nutritional intake to meet a minimum of 25 kcal/kg and 1.5 g PRO/kg daily (per dosing wt 49 kg).  Evaluation: Met    Previous Nutrition Diagnosis  Inadequate protein-energy intake  Evaluation: Improving    CURRENT NUTRITION DIAGNOSIS  Predicted inadequate nutrient intake (calories, protein) related to prolonged hospital LOS and risk for interruptions to TF infusion.      INTERVENTIONS  Implementation  Collaboration with other providers- MICU rounds    Goals  Total avg nutritional intake to meet a minimum of  25 kcal/kg and 1.5 g PRO/kg daily (per dosing wt 49 kg).    Monitoring/Evaluation  Progress toward goals will be monitored and evaluated per protocol.    Ghislaine Montes RD, LD  ([facility] dietitian, [phone])

## 2019-05-22 ENCOUNTER — APPOINTMENT (OUTPATIENT)
Dept: GENERAL RADIOLOGY | Facility: CLINIC | Age: 49
DRG: 987 | End: 2019-05-22
Attending: STUDENT IN AN ORGANIZED HEALTH CARE EDUCATION/TRAINING PROGRAM
Payer: COMMERCIAL

## 2019-05-22 ENCOUNTER — APPOINTMENT (OUTPATIENT)
Dept: PHYSICAL THERAPY | Facility: CLINIC | Age: 49
DRG: 987 | End: 2019-05-22
Attending: ANESTHESIOLOGY
Payer: COMMERCIAL

## 2019-05-22 LAB
ALBUMIN SERPL-MCNC: 3.7 G/DL (ref 3.4–5)
ALBUMIN UR-MCNC: 100 MG/DL
ALP SERPL-CCNC: 241 U/L (ref 40–150)
ALT SERPL W P-5'-P-CCNC: 17 U/L (ref 0–50)
ANION GAP SERPL CALCULATED.3IONS-SCNC: 5 MMOL/L (ref 3–14)
ANION GAP SERPL CALCULATED.3IONS-SCNC: 6 MMOL/L (ref 3–14)
APPEARANCE UR: CLEAR
AST SERPL W P-5'-P-CCNC: 31 U/L (ref 0–45)
BASE EXCESS BLDA CALC-SCNC: 6.8 MMOL/L
BASE EXCESS BLDV CALC-SCNC: 7.7 MMOL/L
BILIRUB SERPL-MCNC: 1.1 MG/DL (ref 0.2–1.3)
BILIRUB UR QL STRIP: NEGATIVE
BUN SERPL-MCNC: 24 MG/DL (ref 7–30)
BUN SERPL-MCNC: 31 MG/DL (ref 7–30)
CALCIUM SERPL-MCNC: 9 MG/DL (ref 8.5–10.1)
CALCIUM SERPL-MCNC: 9.1 MG/DL (ref 8.5–10.1)
CHLORIDE SERPL-SCNC: 102 MMOL/L (ref 94–109)
CHLORIDE SERPL-SCNC: 103 MMOL/L (ref 94–109)
CO2 SERPL-SCNC: 29 MMOL/L (ref 20–32)
CO2 SERPL-SCNC: 30 MMOL/L (ref 20–32)
COLOR UR AUTO: YELLOW
CREAT SERPL-MCNC: 0.56 MG/DL (ref 0.52–1.04)
CREAT SERPL-MCNC: 0.58 MG/DL (ref 0.52–1.04)
ERYTHROCYTE [DISTWIDTH] IN BLOOD BY AUTOMATED COUNT: 17.2 % (ref 10–15)
ERYTHROCYTE [DISTWIDTH] IN BLOOD BY AUTOMATED COUNT: 17.8 % (ref 10–15)
GFR SERPL CREATININE-BSD FRML MDRD: >90 ML/MIN/{1.73_M2}
GFR SERPL CREATININE-BSD FRML MDRD: >90 ML/MIN/{1.73_M2}
GLUCOSE SERPL-MCNC: 137 MG/DL (ref 70–99)
GLUCOSE SERPL-MCNC: 173 MG/DL (ref 70–99)
GLUCOSE UR STRIP-MCNC: NEGATIVE MG/DL
HCO3 BLD-SCNC: 30 MMOL/L (ref 21–28)
HCO3 BLDV-SCNC: 32 MMOL/L (ref 21–28)
HCT VFR BLD AUTO: 24.3 % (ref 35–47)
HCT VFR BLD AUTO: 26.7 % (ref 35–47)
HGB BLD-MCNC: 7.7 G/DL (ref 11.7–15.7)
HGB BLD-MCNC: 8.2 G/DL (ref 11.7–15.7)
HGB UR QL STRIP: ABNORMAL
KETONES UR STRIP-MCNC: 5 MG/DL
LEUKOCYTE ESTERASE UR QL STRIP: ABNORMAL
LMWH PPP CHRO-ACNC: 0.34 IU/ML
MAGNESIUM SERPL-MCNC: 2.7 MG/DL (ref 1.6–2.3)
MCH RBC QN AUTO: 29.8 PG (ref 26.5–33)
MCH RBC QN AUTO: 30.6 PG (ref 26.5–33)
MCHC RBC AUTO-ENTMCNC: 30.7 G/DL (ref 31.5–36.5)
MCHC RBC AUTO-ENTMCNC: 31.7 G/DL (ref 31.5–36.5)
MCV RBC AUTO: 96 FL (ref 78–100)
MCV RBC AUTO: 97 FL (ref 78–100)
NITRATE UR QL: NEGATIVE
O2/TOTAL GAS SETTING VFR VENT: 40 %
O2/TOTAL GAS SETTING VFR VENT: 6 %
OXYHGB MFR BLDV: 62 %
PCO2 BLD: 37 MM HG (ref 35–45)
PCO2 BLDV: 44 MM HG (ref 40–50)
PH BLD: 7.52 PH (ref 7.35–7.45)
PH BLDV: 7.47 PH (ref 7.32–7.43)
PH UR STRIP: 6.5 PH (ref 5–7)
PHOSPHATE SERPL-MCNC: 3 MG/DL (ref 2.5–4.5)
PLATELET # BLD AUTO: 294 10E9/L (ref 150–450)
PLATELET # BLD AUTO: 346 10E9/L (ref 150–450)
PO2 BLD: 77 MM HG (ref 80–105)
PO2 BLDV: 33 MM HG (ref 25–47)
POTASSIUM SERPL-SCNC: 3.9 MMOL/L (ref 3.4–5.3)
POTASSIUM SERPL-SCNC: 4.5 MMOL/L (ref 3.4–5.3)
PROT SERPL-MCNC: 7.8 G/DL (ref 6.8–8.8)
RBC # BLD AUTO: 2.52 10E12/L (ref 3.8–5.2)
RBC # BLD AUTO: 2.75 10E12/L (ref 3.8–5.2)
RBC #/AREA URNS AUTO: >182 /HPF (ref 0–2)
SODIUM SERPL-SCNC: 137 MMOL/L (ref 133–144)
SODIUM SERPL-SCNC: 139 MMOL/L (ref 133–144)
SOURCE: ABNORMAL
SP GR UR STRIP: 1.02 (ref 1–1.03)
UROBILINOGEN UR STRIP-MCNC: NORMAL MG/DL (ref 0–2)
WBC # BLD AUTO: 11 10E9/L (ref 4–11)
WBC # BLD AUTO: 13.1 10E9/L (ref 4–11)
WBC #/AREA URNS AUTO: 2 /HPF (ref 0–5)

## 2019-05-22 PROCEDURE — 36600 WITHDRAWAL OF ARTERIAL BLOOD: CPT

## 2019-05-22 PROCEDURE — 85027 COMPLETE CBC AUTOMATED: CPT | Performed by: STUDENT IN AN ORGANIZED HEALTH CARE EDUCATION/TRAINING PROGRAM

## 2019-05-22 PROCEDURE — 25000132 ZZH RX MED GY IP 250 OP 250 PS 637: Performed by: STUDENT IN AN ORGANIZED HEALTH CARE EDUCATION/TRAINING PROGRAM

## 2019-05-22 PROCEDURE — 94003 VENT MGMT INPAT SUBQ DAY: CPT

## 2019-05-22 PROCEDURE — 85027 COMPLETE CBC AUTOMATED: CPT | Performed by: INTERNAL MEDICINE

## 2019-05-22 PROCEDURE — 25000128 H RX IP 250 OP 636: Performed by: STUDENT IN AN ORGANIZED HEALTH CARE EDUCATION/TRAINING PROGRAM

## 2019-05-22 PROCEDURE — 25000125 ZZHC RX 250

## 2019-05-22 PROCEDURE — 99291 CRITICAL CARE FIRST HOUR: CPT | Mod: GC | Performed by: INTERNAL MEDICINE

## 2019-05-22 PROCEDURE — 83735 ASSAY OF MAGNESIUM: CPT | Performed by: STUDENT IN AN ORGANIZED HEALTH CARE EDUCATION/TRAINING PROGRAM

## 2019-05-22 PROCEDURE — 25000132 ZZH RX MED GY IP 250 OP 250 PS 637

## 2019-05-22 PROCEDURE — 27210429 ZZH NUTRITION PRODUCT INTERMEDIATE LITER

## 2019-05-22 PROCEDURE — 20000004 ZZH R&B ICU UMMC

## 2019-05-22 PROCEDURE — 97530 THERAPEUTIC ACTIVITIES: CPT | Mod: GP | Performed by: PHYSICAL THERAPIST

## 2019-05-22 PROCEDURE — 25800030 ZZH RX IP 258 OP 636

## 2019-05-22 PROCEDURE — 25000125 ZZHC RX 250: Performed by: STUDENT IN AN ORGANIZED HEALTH CARE EDUCATION/TRAINING PROGRAM

## 2019-05-22 PROCEDURE — 81001 URINALYSIS AUTO W/SCOPE: CPT

## 2019-05-22 PROCEDURE — 94640 AIRWAY INHALATION TREATMENT: CPT | Mod: 76

## 2019-05-22 PROCEDURE — 94640 AIRWAY INHALATION TREATMENT: CPT

## 2019-05-22 PROCEDURE — 84100 ASSAY OF PHOSPHORUS: CPT | Performed by: STUDENT IN AN ORGANIZED HEALTH CARE EDUCATION/TRAINING PROGRAM

## 2019-05-22 PROCEDURE — 25000128 H RX IP 250 OP 636

## 2019-05-22 PROCEDURE — 82805 BLOOD GASES W/O2 SATURATION: CPT | Performed by: STUDENT IN AN ORGANIZED HEALTH CARE EDUCATION/TRAINING PROGRAM

## 2019-05-22 PROCEDURE — 40000986 XR CHEST PORT 1 VW

## 2019-05-22 PROCEDURE — 85520 HEPARIN ASSAY: CPT | Performed by: STUDENT IN AN ORGANIZED HEALTH CARE EDUCATION/TRAINING PROGRAM

## 2019-05-22 PROCEDURE — 97110 THERAPEUTIC EXERCISES: CPT | Mod: GP | Performed by: PHYSICAL THERAPIST

## 2019-05-22 PROCEDURE — 25000132 ZZH RX MED GY IP 250 OP 250 PS 637: Performed by: ANESTHESIOLOGY

## 2019-05-22 PROCEDURE — 97162 PT EVAL MOD COMPLEX 30 MIN: CPT | Mod: GP | Performed by: PHYSICAL THERAPIST

## 2019-05-22 PROCEDURE — 80053 COMPREHEN METABOLIC PANEL: CPT | Performed by: STUDENT IN AN ORGANIZED HEALTH CARE EDUCATION/TRAINING PROGRAM

## 2019-05-22 PROCEDURE — 25800030 ZZH RX IP 258 OP 636: Performed by: STUDENT IN AN ORGANIZED HEALTH CARE EDUCATION/TRAINING PROGRAM

## 2019-05-22 PROCEDURE — 25800030 ZZH RX IP 258 OP 636: Performed by: ANESTHESIOLOGY

## 2019-05-22 PROCEDURE — 80048 BASIC METABOLIC PNL TOTAL CA: CPT

## 2019-05-22 PROCEDURE — 82803 BLOOD GASES ANY COMBINATION: CPT | Performed by: STUDENT IN AN ORGANIZED HEALTH CARE EDUCATION/TRAINING PROGRAM

## 2019-05-22 PROCEDURE — 40000275 ZZH STATISTIC RCP TIME EA 10 MIN

## 2019-05-22 RX ORDER — LORAZEPAM 2 MG/ML
1 INJECTION INTRAMUSCULAR EVERY 4 HOURS PRN
Status: DISCONTINUED | OUTPATIENT
Start: 2019-05-22 | End: 2019-05-23

## 2019-05-22 RX ORDER — FUROSEMIDE 10 MG/ML
40 INJECTION INTRAMUSCULAR; INTRAVENOUS ONCE
Status: DISCONTINUED | OUTPATIENT
Start: 2019-05-22 | End: 2019-05-22

## 2019-05-22 RX ORDER — METHYLPREDNISOLONE SODIUM SUCCINATE 125 MG/2ML
125 INJECTION, POWDER, LYOPHILIZED, FOR SOLUTION INTRAMUSCULAR; INTRAVENOUS ONCE
Status: DISCONTINUED | OUTPATIENT
Start: 2019-05-22 | End: 2019-05-22

## 2019-05-22 RX ORDER — FLUTICASONE PROPIONATE 50 MCG
1 SPRAY, SUSPENSION (ML) NASAL DAILY
Status: DISCONTINUED | OUTPATIENT
Start: 2019-05-22 | End: 2019-05-29 | Stop reason: HOSPADM

## 2019-05-22 RX ORDER — ONDANSETRON 2 MG/ML
4 INJECTION INTRAMUSCULAR; INTRAVENOUS EVERY 6 HOURS PRN
Status: DISCONTINUED | OUTPATIENT
Start: 2019-05-22 | End: 2019-05-29 | Stop reason: HOSPADM

## 2019-05-22 RX ORDER — HALOPERIDOL 5 MG/ML
2 INJECTION INTRAMUSCULAR EVERY 6 HOURS PRN
Status: DISCONTINUED | OUTPATIENT
Start: 2019-05-22 | End: 2019-05-23

## 2019-05-22 RX ORDER — FUROSEMIDE 10 MG/ML
40 INJECTION INTRAMUSCULAR; INTRAVENOUS ONCE
Status: COMPLETED | OUTPATIENT
Start: 2019-05-22 | End: 2019-05-22

## 2019-05-22 RX ORDER — DIPHENHYDRAMINE HYDROCHLORIDE 50 MG/ML
25 INJECTION INTRAMUSCULAR; INTRAVENOUS ONCE
Status: COMPLETED | OUTPATIENT
Start: 2019-05-22 | End: 2019-05-22

## 2019-05-22 RX ORDER — METHYLPREDNISOLONE SODIUM SUCCINATE 40 MG/ML
40 INJECTION, POWDER, LYOPHILIZED, FOR SOLUTION INTRAMUSCULAR; INTRAVENOUS EVERY 8 HOURS
Status: DISCONTINUED | OUTPATIENT
Start: 2019-05-22 | End: 2019-05-23

## 2019-05-22 RX ORDER — METHYLPREDNISOLONE SODIUM SUCCINATE 40 MG/ML
40 INJECTION, POWDER, LYOPHILIZED, FOR SOLUTION INTRAMUSCULAR; INTRAVENOUS EVERY 12 HOURS
Status: DISCONTINUED | OUTPATIENT
Start: 2019-05-22 | End: 2019-05-22

## 2019-05-22 RX ORDER — MAGNESIUM CARB/ALUMINUM HYDROX 105-160MG
296 TABLET,CHEWABLE ORAL ONCE
Status: COMPLETED | OUTPATIENT
Start: 2019-05-22 | End: 2019-05-22

## 2019-05-22 RX ADMIN — MIDAZOLAM 3 MG: 1 INJECTION INTRAMUSCULAR; INTRAVENOUS at 18:55

## 2019-05-22 RX ADMIN — FENTANYL CITRATE 100 MCG: 50 INJECTION, SOLUTION INTRAMUSCULAR; INTRAVENOUS at 08:11

## 2019-05-22 RX ADMIN — HEPARIN SODIUM 1500 UNITS/HR: 10000 INJECTION, SOLUTION INTRAVENOUS at 08:12

## 2019-05-22 RX ADMIN — OLANZAPINE 10 MG: 10 TABLET, FILM COATED ORAL at 08:42

## 2019-05-22 RX ADMIN — MIDAZOLAM 4 MG: 1 INJECTION INTRAMUSCULAR; INTRAVENOUS at 01:30

## 2019-05-22 RX ADMIN — SENNOSIDES AND DOCUSATE SODIUM 2 TABLET: 8.6; 5 TABLET ORAL at 19:50

## 2019-05-22 RX ADMIN — SENNOSIDES AND DOCUSATE SODIUM 2 TABLET: 8.6; 5 TABLET ORAL at 08:42

## 2019-05-22 RX ADMIN — ONDANSETRON 4 MG: 2 INJECTION INTRAMUSCULAR; INTRAVENOUS at 22:39

## 2019-05-22 RX ADMIN — MIDAZOLAM 4 MG: 1 INJECTION INTRAMUSCULAR; INTRAVENOUS at 08:12

## 2019-05-22 RX ADMIN — POTASSIUM CHLORIDE 20 MEQ: 29.8 INJECTION, SOLUTION INTRAVENOUS at 05:31

## 2019-05-22 RX ADMIN — METHYLPREDNISOLONE SODIUM SUCCINATE 40 MG: 40 INJECTION, POWDER, LYOPHILIZED, FOR SOLUTION INTRAMUSCULAR; INTRAVENOUS at 13:16

## 2019-05-22 RX ADMIN — POLYETHYLENE GLYCOL 3350 17 G: 17 POWDER, FOR SOLUTION ORAL at 04:48

## 2019-05-22 RX ADMIN — MIDAZOLAM 2 MG: 1 INJECTION INTRAMUSCULAR; INTRAVENOUS at 16:45

## 2019-05-22 RX ADMIN — Medication 100 MCG/HR: at 11:18

## 2019-05-22 RX ADMIN — IPRATROPIUM BROMIDE AND ALBUTEROL SULFATE 3 ML: .5; 3 SOLUTION RESPIRATORY (INHALATION) at 15:47

## 2019-05-22 RX ADMIN — HALOPERIDOL LACTATE 2 MG: 5 INJECTION INTRAMUSCULAR at 08:40

## 2019-05-22 RX ADMIN — POLYETHYLENE GLYCOL 3350 17 G: 17 POWDER, FOR SOLUTION ORAL at 19:50

## 2019-05-22 RX ADMIN — CEFEPIME 2 G: 2 INJECTION, POWDER, FOR SOLUTION INTRAVENOUS at 04:48

## 2019-05-22 RX ADMIN — METHYLPREDNISOLONE SODIUM SUCCINATE 40 MG: 40 INJECTION, POWDER, LYOPHILIZED, FOR SOLUTION INTRAMUSCULAR; INTRAVENOUS at 21:09

## 2019-05-22 RX ADMIN — PROPOFOL 50 MCG/KG/MIN: 10 INJECTION, EMULSION INTRAVENOUS at 07:04

## 2019-05-22 RX ADMIN — Medication 100 MG: at 08:42

## 2019-05-22 RX ADMIN — MIDAZOLAM 3 MG: 1 INJECTION INTRAMUSCULAR; INTRAVENOUS at 19:52

## 2019-05-22 RX ADMIN — LORAZEPAM 1 MG: 2 INJECTION INTRAMUSCULAR; INTRAVENOUS at 19:20

## 2019-05-22 RX ADMIN — IPRATROPIUM BROMIDE AND ALBUTEROL SULFATE 3 ML: .5; 3 SOLUTION RESPIRATORY (INHALATION) at 07:38

## 2019-05-22 RX ADMIN — FENTANYL CITRATE 100 MCG: 50 INJECTION, SOLUTION INTRAMUSCULAR; INTRAVENOUS at 19:10

## 2019-05-22 RX ADMIN — DEXMEDETOMIDINE 1.2 MCG/KG/HR: 100 INJECTION, SOLUTION, CONCENTRATE INTRAVENOUS at 15:33

## 2019-05-22 RX ADMIN — FENTANYL CITRATE 100 MCG: 50 INJECTION, SOLUTION INTRAMUSCULAR; INTRAVENOUS at 14:28

## 2019-05-22 RX ADMIN — IPRATROPIUM BROMIDE AND ALBUTEROL SULFATE 3 ML: .5; 3 SOLUTION RESPIRATORY (INHALATION) at 11:45

## 2019-05-22 RX ADMIN — PANTOPRAZOLE SODIUM 40 MG: 40 TABLET, DELAYED RELEASE ORAL at 08:43

## 2019-05-22 RX ADMIN — DOCUSATE SODIUM 286 ML: 50 LIQUID ORAL at 13:19

## 2019-05-22 RX ADMIN — FENTANYL CITRATE 100 MCG: 50 INJECTION, SOLUTION INTRAMUSCULAR; INTRAVENOUS at 02:00

## 2019-05-22 RX ADMIN — MIDAZOLAM 4 MG: 1 INJECTION INTRAMUSCULAR; INTRAVENOUS at 06:27

## 2019-05-22 RX ADMIN — PROPOFOL 40 MCG/KG/MIN: 10 INJECTION, EMULSION INTRAVENOUS at 13:36

## 2019-05-22 RX ADMIN — OLANZAPINE 10 MG: 10 TABLET, FILM COATED ORAL at 19:50

## 2019-05-22 RX ADMIN — FENTANYL CITRATE 100 MCG: 50 INJECTION, SOLUTION INTRAMUSCULAR; INTRAVENOUS at 17:59

## 2019-05-22 RX ADMIN — MIDAZOLAM 4 MG: 1 INJECTION INTRAMUSCULAR; INTRAVENOUS at 02:30

## 2019-05-22 RX ADMIN — DEXMEDETOMIDINE 1.2 MCG/KG/HR: 100 INJECTION, SOLUTION, CONCENTRATE INTRAVENOUS at 08:40

## 2019-05-22 RX ADMIN — HALOPERIDOL LACTATE 2 MG: 5 INJECTION INTRAMUSCULAR at 20:07

## 2019-05-22 RX ADMIN — Medication 10 MG: at 17:09

## 2019-05-22 RX ADMIN — FENTANYL CITRATE 100 MCG: 50 INJECTION, SOLUTION INTRAMUSCULAR; INTRAVENOUS at 09:54

## 2019-05-22 RX ADMIN — FOLIC ACID 1 MG: 1 TABLET ORAL at 08:42

## 2019-05-22 RX ADMIN — CEFEPIME 2 G: 2 INJECTION, POWDER, FOR SOLUTION INTRAVENOUS at 11:18

## 2019-05-22 RX ADMIN — SODIUM CHLORIDE: 9 INJECTION, SOLUTION INTRAVENOUS at 18:55

## 2019-05-22 RX ADMIN — DEXMEDETOMIDINE 1.2 MCG/KG/HR: 100 INJECTION, SOLUTION, CONCENTRATE INTRAVENOUS at 00:23

## 2019-05-22 RX ADMIN — MIDAZOLAM 3 MG: 1 INJECTION INTRAMUSCULAR; INTRAVENOUS at 17:59

## 2019-05-22 RX ADMIN — DIPHENHYDRAMINE HYDROCHLORIDE 25 MG: 50 INJECTION INTRAMUSCULAR; INTRAVENOUS at 08:39

## 2019-05-22 RX ADMIN — MIDAZOLAM 2 MG: 1 INJECTION INTRAMUSCULAR; INTRAVENOUS at 17:08

## 2019-05-22 RX ADMIN — DEXMEDETOMIDINE 1.2 MCG/KG/HR: 100 INJECTION, SOLUTION, CONCENTRATE INTRAVENOUS at 15:44

## 2019-05-22 RX ADMIN — MAGNESIUM CITRATE 296 ML: 1.75 LIQUID ORAL at 13:16

## 2019-05-22 RX ADMIN — LEVOTHYROXINE SODIUM 50 MCG: 25 TABLET ORAL at 09:54

## 2019-05-22 RX ADMIN — MIDAZOLAM 3 MG: 1 INJECTION INTRAMUSCULAR; INTRAVENOUS at 15:00

## 2019-05-22 RX ADMIN — ACETAMINOPHEN 975 MG: 325 TABLET, FILM COATED ORAL at 08:42

## 2019-05-22 RX ADMIN — BISACODYL 10 MG: 10 SUPPOSITORY RECTAL at 11:00

## 2019-05-22 RX ADMIN — FENTANYL CITRATE 50 MCG: 50 INJECTION, SOLUTION INTRAMUSCULAR; INTRAVENOUS at 17:09

## 2019-05-22 RX ADMIN — MULTIVITAMIN 15 ML: LIQUID ORAL at 08:42

## 2019-05-22 RX ADMIN — MIDAZOLAM 3 MG/HR: 5 INJECTION INTRAMUSCULAR; INTRAVENOUS at 16:59

## 2019-05-22 RX ADMIN — FENTANYL CITRATE 100 MCG: 50 INJECTION, SOLUTION INTRAMUSCULAR; INTRAVENOUS at 15:45

## 2019-05-22 RX ADMIN — MIDAZOLAM 3 MG: 1 INJECTION INTRAMUSCULAR; INTRAVENOUS at 14:00

## 2019-05-22 RX ADMIN — DEXMEDETOMIDINE 1.2 MCG/KG/HR: 100 INJECTION, SOLUTION, CONCENTRATE INTRAVENOUS at 08:13

## 2019-05-22 RX ADMIN — MIDAZOLAM 4 MG: 1 INJECTION INTRAMUSCULAR; INTRAVENOUS at 08:30

## 2019-05-22 RX ADMIN — FUROSEMIDE 40 MG: 10 INJECTION, SOLUTION INTRAVENOUS at 05:31

## 2019-05-22 ASSESSMENT — ACTIVITIES OF DAILY LIVING (ADL)
ADLS_ACUITY_SCORE: 17
ADLS_ACUITY_SCORE: 15
ADLS_ACUITY_SCORE: 17
ADLS_ACUITY_SCORE: 17
ADLS_ACUITY_SCORE: 15
ADLS_ACUITY_SCORE: 16

## 2019-05-22 ASSESSMENT — MIFFLIN-ST. JEOR: SCORE: 1175.62

## 2019-05-22 NOTE — PROGRESS NOTES
[facility] Intensive Care Progress Note  Date of Service (when I saw the patient): 05/22/2019    Past 24 Hrs:  Bronch yesterday without evidence of DAH  Continued low grade fevers  Working on weaning benzos to ready for extubation  However did not tolerate PEEP wean/PST yesterday     Assessment & Plan   Ana Laura Wharton is a 48 year old female with a PMHx significant for recurrent UTIs, HTN, bipolar I, and anxiety/depression who remains in the ICU after intentional medication overdose.  She remains intubated/sedated, but nearing ready for extubation pending sedation wean.      Neurologic  #Concern for Anoxic Brain Injury: EEG without seizure activity.  MRI without abnormalities.Starting to have purposeful movements, low suspicion for anoxic brain injury.   - Monitor neuro status    Sedation:   Propofol  Precedex  Fentanyl  Versed (minimize as able)   PRN ativan     #Suicide attempt  #Bipolar I, anxiety, depression  - Psych consult when medically stable   - Holding PTA citalopram 20mg every day, mirtazapine 60mg QPM              Consider resuming pending psych recs  - Sitter once awake    #EtOH Abuse: Heavy drinking prior to admission, long history of EtOH abuse  - IV thiamine and folate    Cardiovascular  #Clonidine/carvedilol overdose:   #Hypotension: lactic acidosis:  Patient reported taking 180 tabs total of both Coreg 6.25mg and clonidine 0.1mg on 5/11at 7pm; d/w poison control; s/p 10mg Narcan x2 and insulin gtt, intralipid, norepi, epi, phenyl, vasopressin, dopamine, and angiotensin 2.  Able to decreased Epi significantly after starting AT2.  Overnight 5/12 received methylene blue and additional fluids.  Lactate normalized.  Insulin gtt stopped overnight 5/14.  Echocardiograms during admission have shown normal EF. Now off pressors.      #Prolonged QTc  Patient presented w/ QTc of 507, decreased on recheck    Respiratory  #Acute hypoxic/hypercapnic  respiratory failure requiring intubation:  #Bilateral pleural effusion: Has bilateral infiltrates but volume overload may present alterative cause or exacerbating factor. Volume status improving however still likely contributing.  Some concern for PNA with fevers, although procal not increasing.  And CT appearance and interval hgb drops and fevers I do maintain concern for DAH as possible uniting diagnosis.     - Working on decreasing vent support  - ABX as below  - Planning on bronch today    Gastrointestinal    # Nutrition:   - Nutrition consulted for TF  - Speech eval when extubated    #Ileus/constipation:  CT abd/pelvis w/ con showed large stool burden.  Bowel movements briefly resumed with enemas and bowel regimen.  Now minimal stool output.  5/22 aggressive bowel regimen started again.    - Continuing bowel regimen as needed    #Pancreatitis: Resolved   #Hx of alcohol induced pancreatitis   Lipase 1,100; per chart review patient was c/o abdominal pain prior to intubation, CT abd/pelvis w/ con showed no pancreatitis.  Per friend was drinking heavily prior to admission.  Lipase normalized on 5/14 recheck.     Infectious Disease   # Concern for aspiration/CAP:   # Fever:  CT chest with multifocal primarily central opacities but procal not elevated.  Resumed abx but considering other etiologies.  Including drug, UTI, considered DAH, but bronch unrevealing.   - Continue cefepime   - Follow up repeat UA  - Clutures    Antibiotics:  Cefepime 5/11-12 5/19-present  Flagyl 5/11-12  Ceftriaxone 5/13-18  Vancomycin 5/19-5/20    Cultures:  MRSA nares 5/12 negative, 5/19 negative  Blood cultures: 5/11 NGTD, 5/15 NGTD, 5/19 NGTD  UC 5/11 >100k colonies coag - staph (susceptabilities pending)  UC  5/12 NGTD  Sputum 5/15: mixed mina 5/19 NGTD  BAL 5/21 pending    Hematology  #Right internal jugular thrombus: suspected line associated 2/2 attempted line placement at OSH.  Interval increase on repeat US 5/14.  Started on  heparin for this.    - Heparin drip, high intensity    #Acute Normocytic Anemia:  Suspect initial drop was likely dilutional.  However interval drop 5/15-16 unclear cause.  No overt bleeding at this time.  Possibly still related to fluid shifts or lab fluctuation.  Marrow suppression 2/2 illness not unlikely (retic index hypo proliferative) and repeated blood draws could be to blame. No sign of GIB currently.     - Required transfusion x3  - Peripheral smear without hemolysis  - Transfuse for hgb <7  - CTM    #Leukocytosis: Resolved.  Reactive vs infectious.  Stress due to incomplete sedation?  - See ID section  - CTM    Endocrine  #Glucose  - CTM    #Hypothyroidism   - Cont Synthroid     Renal/Electolytes/FEN  #Volume overload: 2/2 initial fluid resuscitation.  Very responsive to diuresis.  Nearing euvolemic.    - Albumin assisted diuresis   - Strict I/O  - Napoles in   - Goal net negative 1-2L daily. Lasix as needed    #Oliguria and EDUARDO:  Resolved.   BUN and creatinine mildly elevated on presentation.  FENa was 0.1%.  Consistent with prerenal picture.     #Hypernatremia: Resovled  Due to diuresis and salt load from tube feeds.   - Aggressively replaced water 5/17  - CTM    #Hx overactive bladder:  - Cont oxybutynin, hold mirabegron     #Hx frequent UTIs:  - Holding cipro 125mg every day for UTI ppx given prolonged QT   - Currently on CTX as above    ACID/BASE/ELECTROLYTES:   #Metabolic alkalosis: mild  - CTM    #Electrolytes:  - On protocol   - CTM    Skin Care  #Pressure injury coccyx  - Turns per nursing    Restraints: Restraints for medical healing needed: YES    Family update by me today: Call attemted today, no answer   Family: Ex  Gabriel was listed as emergency contact: [phone], boyfriend Nessa has been present at bedside [phone].  Mother is reportedly? estranged (per SO), but would like to be decision maker [phone].  Has 16 year old son who she has not spoken with and Brother Linden  [phone]    Per SW consult.  Brother Linden will be decision maker for now, however mother has expressed desire to assume this role.  Complex psycho social back drop.  Mother has been grossly appropriate with staff thus far.  Appreciate SW assistance in this matter.     CODE: FULL    Dispo: ICU pending extubation    Patient discussed with staff attending, Dr. Rogers.  Please feel free to page with questions.    Plans for next 24 Hrs:  Wean sedation as able  Extubate tomorrow  Continue bowel regimen    Physical Exam   Temp: 100.3  F (37.9  C) Temp src: Axillary Temp  Min: 97.6  F (36.4  C)  Max: 100.3  F (37.9  C) BP: 114/70 Pulse: 87 Heart Rate: 90 Resp: 17 SpO2: 97 % O2 Device: Mechanical Ventilator    Vitals:    05/20/19 0500 05/21/19 0400 05/22/19 0200   Weight: 53.5 kg (117 lb 15.1 oz) 54.9 kg (121 lb 0.5 oz) 54.1 kg (119 lb 4.3 oz)     Ventilation Mode: CMV/AC  (Continuous Mandatory Ventilation/ Assist Control)  FiO2 (%): 40 %  Rate Set (breaths/minute): 14 breaths/min  Tidal Volume Set (mL): 500 mL  PEEP (cm H2O): 8 cmH2O  Pressure Support (cm H2O): 10 cmH2O  Oxygen Concentration (%): 40 %  Resp: 17    I/O last 3 completed shifts:  In: 4024.43 [I.V.:1454.43; NG/GT:1335]  Out: 4365 [Urine:4365]    Gen: Intubated, increasingly reponsive  HEENT:AT/ NC, PERRL,  sclera anicteric  PULM/THORAX:Coarse breath sounds anteriorly  CV:RRR, S1 and S2 appreciated, no extra heart sounds, murmurs or rub auscultated  ABD: Soft, non distended, normal bowel sounds  EXT: Warm,  Pitting edema present in hands and feet  SKIN: Capillary refill normal  NEURO: Responding to tactile stim, moving all four ext and following some commands.  PERRL.      Lines: No erythema or discharge at entry site for Central Venous Catheter  Current lines are required for patient management    Medications     dexmedetomidine 1.2 mcg/kg/hr (05/22/19 0600)     IV fluid REPLACEMENT ONLY Stopped (05/17/19 1335)     fentaNYL 100 mcg/hr (05/22/19 0600)      HEParin 1,500 Units/hr (05/22/19 0600)     midazolam Stopped (05/22/19 0600)     - MEDICATION INSTRUCTIONS -       - MEDICATION INSTRUCTIONS -       propofol (DIPRIVAN) infusion 50 mcg/kg/min (05/22/19 0600)     sodium chloride 10 mL/hr at 05/14/19 1500       ceFEPIme (MAXIPIME) IV  2 g Intravenous Q8H     folic acid  1 mg Oral or Feeding Tube Daily     ipratropium - albuterol 0.5 mg/2.5 mg/3 mL  3 mL Nebulization 4x daily     levothyroxine  50 mcg Oral Daily     multivitamins w/minerals  15 mL Per Feeding Tube Daily     OLANZapine  10 mg Oral or Feeding Tube BID     pantoprazole  40 mg Oral or Feeding Tube QAM     vitamin B1  100 mg Oral or Feeding Tube Daily       Data   Recent Labs   Lab 05/22/19  0442 05/21/19  1547 05/21/19  0419  05/19/19  0434   WBC 11.0 10.6 12.6*   < > 9.7   HGB 7.7* 8.0* 8.4*   < > 6.4*   MCV 96 96 95   < > 96    297 279   < > 210   INR  --   --   --   --  1.26*    136 136   < > 140   POTASSIUM 3.9 4.2 4.1   < > 4.4   CHLORIDE 102 104 100   < > 106   CO2 29 28 29   < > 32   BUN 24 20 14   < > 12   CR 0.58 0.58 0.60   < > 0.66   ANIONGAP 6 4 6   < > 3   ISAURO 9.0 8.9 8.9   < > 7.9*   * 129* 125*   < > 126*   ALBUMIN 3.7  --  3.8   < > 2.2*   PROTTOTAL 7.8  --  7.6   < > 5.6*   BILITOTAL 1.1  --  1.7*   < > 1.4*   ALKPHOS 241*  --  262*   < > 248*   ALT 17  --  17   < > 18   AST 31  --  41   < > 35    < > = values in this interval not displayed.     No results found for this or any previous visit (from the past 24 hour(s)).

## 2019-05-22 NOTE — PLAN OF CARE
D: Restless in bed, following commands and moving all extremities. Attempting to wean off versed, started propofol. On 1.2 precedex, 100 fentanyl, 2 versed and 40 propofol. CMV settings FiO2 45% PEEP 8. Coarse lung sounds. 1 hard formed stool after mg citrate and fleets enema given.   I/A: Bronchoscopy completed today. Attempted to decrease PEEP from 8 to 5. O2 sat decreased to 88%. PEEP increased back to 8. Attempted to pressure support 10/5  RR increased to 40's. 40 mg IV lasix given, net neg 580 ml today.   P: continue to wean vent as able      Problem: Mood Alteration Comorbidity  Goal: Mood Alteration Comorbidity  Description  Patient comorbidity will be monitored for signs and symptoms of Mood Alteration condition.  Problems will be absent, minimized or managed by discharge/transition of care.  5/21/2019 1936 by Deisy Morales, RN  Outcome: Improving  5/21/2019 1852 by Deisy Morales, RN  Outcome: Improving  5/21/2019 0811 by Cheyenne Brenner, RN  Outcome: Improving

## 2019-05-22 NOTE — PROGRESS NOTES
Pt on PS 7/8 for 100 minutes. Returned to full support per MD. On the following settings:    Ventilation Mode: CMV/AC  (Continuous Mandatory Ventilation/ Assist Control)  FiO2 (%): 35 %  Rate Set (breaths/minute): 14 breaths/min  Tidal Volume Set (mL): 500 mL  PEEP (cm H2O): 8 cmH2O  Pressure Support (cm H2O): 7 cmH2O  Oxygen Concentration (%): 40 %  Resp: 23    5/22/2019  Kelli Fagan

## 2019-05-22 NOTE — PROGRESS NOTES
D/I: Increased agitation throughout night.  Prn versed and fentanyl given.    A/P: Able to wean versed gtt off with prns, but continues on propofol, fentanyl and precedex.  Pt redirectable at times, but needed soft limb restraints applied.  Pt attempting to crawl out of bed. Miralax given x1, no bm overnight.  Lasix 40mg given this am with >1L output.  During shift change pt very agitated, attempting to kick staff, non redirectable.  Fentanyl and versed given with additional 4mg versed to calm patient.  Plan to restart versed gtt.  MICU to discuss POC r/t sedation.

## 2019-05-22 NOTE — PROGRESS NOTES
D: Pt on Peep 8, Fi02 45%. Following commands, agitated, attempting to get out of bed and pull on lines/tubes despite frequent redirection. Abdomen semi-firm  I: Restart versed gtt at 3 mg/hr. PRN versed and fentanyl boluses, PRN haldol and benadryl X 1. Wean fi02 to 30%. PRN suppository, senna, mag citrate, enema. PS trial X 2 hours. , RR 25.  Steroids for airway inflammation.  A: Pt tolerating vent wean with sats 93-95%. Pt following commands, remains intermittently agitated.   P: Wean sedation as tolerated. Repeat PS trial this fanta. Anticipate possible extubation attempt in am.Continue MICU POC.

## 2019-05-22 NOTE — PLAN OF CARE
4C: Per chart review and discussion with PT, pt intermittently following commands and too agitated for OOB activity. Will HOLD OT eval at this time. PT will follow for ROM/early mobility as appropriate and inform OT when pt able to tolerate additional therapy interventions.

## 2019-05-22 NOTE — PROGRESS NOTES
05/22/19 1511   Quick Adds   Type of Visit Initial PT Evaluation   Living Environment   Living Environment Comment pt intubated, no family present    Self-Care   Current Activity Tolerance fair   Activity/Exercise/Self-Care Comment per chart, pt IND at baseline    Functional Level Prior   Prior Functional Level Comment per chart, pt IND at baseline, has had 1 recent fall    General Information   Onset of Illness/Injury or Date of Surgery - Date 05/11/19   Referring Physician Duane Hill MD   Patient/Family Goals Statement unable to state   Pertinent History of Current Problem (include personal factors and/or comorbidities that impact the POC) 48 year old female with a PMHx significant for bipolar I d/o, anxiety, depression, recurrent UTIs, and HTN who is being transferred from [facility] ED for medication overdose as a suicide attempt.    Precautions/Limitations oxygen therapy device and L/min   Heart Disease Risk Factors Medical history;High blood pressure   General Observations pt supine, in restraints, orally intubated   Cognitive Status Examination   Orientation person  (uanble to fully assess)   Level of Consciousness alert   Follows Commands and Answers Questions 25% of the time   Personal Safety and Judgment impulsive   Pain Assessment   Patient Currently in Pain Yes, see Vital Sign flowsheet   Integumentary/Edema   Integumentary/Edema Comments skin tears/abrasions on B shins/knees    Posture    Posture Forward head position   Range of Motion (ROM)   ROM Quick Adds No deficits were identified   Strength   Strength Comments unable to fully assess, able to strongly resist motion at times    Bed Mobility   Bed Mobility Comments mod A    Transfer Skills   Transfer Comments total A   General Therapy Interventions   Planned Therapy Interventions progressive activity/exercise;home program guidelines;risk factor education;transfer training;stretching;gait training;balance training;bed mobility training  "  Clinical Impression   Criteria for Skilled Therapeutic Intervention yes, treatment indicated   PT Diagnosis impaired mobility    Influenced by the following impairments impaired balance, strength   Functional limitations due to impairments impaired transfers, gait    Clinical Presentation Evolving/Changing   Clinical Presentation Rationale pt with agitation affecting particpiation    Clinical Decision Making (Complexity) Moderate complexity   Therapy Frequency` 3 times/week   Predicted Duration of Therapy Intervention (days/wks) 1 week    Anticipated Discharge Disposition Transitional Care Facility   Risk & Benefits of therapy have been explained Yes   Patient, Family & other staff in agreement with plan of care Yes   Clinical Impression Comments anticipate rehab needs due to prolonged hospital course   [facility] TM \"6 Clicks\"   2016, Trustees of [facility], under license to [employer].  All rights reserved.   6 Clicks Short Forms Basic Mobility Inpatient Short Form   [facility]  \"6 Clicks\" V.2 Basic Mobility Inpatient Short Form   1. Turning from your back to your side while in a flat bed without using bedrails? 2 - A Lot   2. Moving from lying on your back to sitting on the side of a flat bed without using bedrails? 2 - A Lot   3. Moving to and from a bed to a chair (including a wheelchair)? 1 - Total   4. Standing up from a chair using your arms (e.g., wheelchair, or bedside chair)? 1 - Total   5. To walk in hospital room? 1 - Total   6. Climbing 3-5 steps with a railing? 1 - Total   Basic Mobility Raw Score (Score out of 24.Lower scores equate to lower levels of function) 8   Total Evaluation Time   Total Evaluation Time (Minutes) 4     "

## 2019-05-23 ENCOUNTER — APPOINTMENT (OUTPATIENT)
Dept: PHYSICAL THERAPY | Facility: CLINIC | Age: 49
DRG: 987 | End: 2019-05-23
Attending: ANESTHESIOLOGY
Payer: COMMERCIAL

## 2019-05-23 ENCOUNTER — APPOINTMENT (OUTPATIENT)
Dept: SPEECH THERAPY | Facility: CLINIC | Age: 49
DRG: 987 | End: 2019-05-23
Attending: ANESTHESIOLOGY
Payer: COMMERCIAL

## 2019-05-23 LAB
ALBUMIN SERPL-MCNC: 4.2 G/DL (ref 3.4–5)
ALP SERPL-CCNC: 264 U/L (ref 40–150)
ALT SERPL W P-5'-P-CCNC: 19 U/L (ref 0–50)
ANION GAP SERPL CALCULATED.3IONS-SCNC: 6 MMOL/L (ref 3–14)
AST SERPL W P-5'-P-CCNC: 27 U/L (ref 0–45)
BACTERIA SPEC CULT: NORMAL
BILIRUB SERPL-MCNC: 1.2 MG/DL (ref 0.2–1.3)
BUN SERPL-MCNC: 29 MG/DL (ref 7–30)
CALCIUM SERPL-MCNC: 9.8 MG/DL (ref 8.5–10.1)
CHLORIDE SERPL-SCNC: 104 MMOL/L (ref 94–109)
CO2 SERPL-SCNC: 29 MMOL/L (ref 20–32)
CREAT SERPL-MCNC: 0.52 MG/DL (ref 0.52–1.04)
CRP SERPL-MCNC: 61 MG/L (ref 0–8)
ERYTHROCYTE [DISTWIDTH] IN BLOOD BY AUTOMATED COUNT: 17.6 % (ref 10–15)
GFR SERPL CREATININE-BSD FRML MDRD: >90 ML/MIN/{1.73_M2}
GLUCOSE SERPL-MCNC: 132 MG/DL (ref 70–99)
HCT VFR BLD AUTO: 28.1 % (ref 35–47)
HGB BLD-MCNC: 8.8 G/DL (ref 11.7–15.7)
LMWH PPP CHRO-ACNC: 0.46 IU/ML
MAGNESIUM SERPL-MCNC: 3 MG/DL (ref 1.6–2.3)
MCH RBC QN AUTO: 30 PG (ref 26.5–33)
MCHC RBC AUTO-ENTMCNC: 31.3 G/DL (ref 31.5–36.5)
MCV RBC AUTO: 96 FL (ref 78–100)
PHOSPHATE SERPL-MCNC: 3.2 MG/DL (ref 2.5–4.5)
PLATELET # BLD AUTO: 455 10E9/L (ref 150–450)
POTASSIUM SERPL-SCNC: 4 MMOL/L (ref 3.4–5.3)
PROT SERPL-MCNC: 9.1 G/DL (ref 6.8–8.8)
RBC # BLD AUTO: 2.93 10E12/L (ref 3.8–5.2)
SODIUM SERPL-SCNC: 139 MMOL/L (ref 133–144)
SPECIMEN SOURCE: NORMAL
WBC # BLD AUTO: 13.4 10E9/L (ref 4–11)

## 2019-05-23 PROCEDURE — 84100 ASSAY OF PHOSPHORUS: CPT | Performed by: STUDENT IN AN ORGANIZED HEALTH CARE EDUCATION/TRAINING PROGRAM

## 2019-05-23 PROCEDURE — 92610 EVALUATE SWALLOWING FUNCTION: CPT | Mod: GN

## 2019-05-23 PROCEDURE — 85027 COMPLETE CBC AUTOMATED: CPT | Performed by: STUDENT IN AN ORGANIZED HEALTH CARE EDUCATION/TRAINING PROGRAM

## 2019-05-23 PROCEDURE — 86140 C-REACTIVE PROTEIN: CPT | Performed by: STUDENT IN AN ORGANIZED HEALTH CARE EDUCATION/TRAINING PROGRAM

## 2019-05-23 PROCEDURE — 25000132 ZZH RX MED GY IP 250 OP 250 PS 637: Performed by: ANESTHESIOLOGY

## 2019-05-23 PROCEDURE — 25000128 H RX IP 250 OP 636: Performed by: STUDENT IN AN ORGANIZED HEALTH CARE EDUCATION/TRAINING PROGRAM

## 2019-05-23 PROCEDURE — 92526 ORAL FUNCTION THERAPY: CPT | Mod: GN

## 2019-05-23 PROCEDURE — 25000132 ZZH RX MED GY IP 250 OP 250 PS 637: Performed by: STUDENT IN AN ORGANIZED HEALTH CARE EDUCATION/TRAINING PROGRAM

## 2019-05-23 PROCEDURE — 99222 1ST HOSP IP/OBS MODERATE 55: CPT | Performed by: PSYCHIATRY & NEUROLOGY

## 2019-05-23 PROCEDURE — 12000001 ZZH R&B MED SURG/OB UMMC

## 2019-05-23 PROCEDURE — 99291 CRITICAL CARE FIRST HOUR: CPT | Mod: GC | Performed by: INTERNAL MEDICINE

## 2019-05-23 PROCEDURE — 85520 HEPARIN ASSAY: CPT | Performed by: STUDENT IN AN ORGANIZED HEALTH CARE EDUCATION/TRAINING PROGRAM

## 2019-05-23 PROCEDURE — 97116 GAIT TRAINING THERAPY: CPT | Mod: GP | Performed by: PHYSICAL THERAPIST

## 2019-05-23 PROCEDURE — 40000275 ZZH STATISTIC RCP TIME EA 10 MIN

## 2019-05-23 PROCEDURE — 94640 AIRWAY INHALATION TREATMENT: CPT | Mod: 76

## 2019-05-23 PROCEDURE — 80053 COMPREHEN METABOLIC PANEL: CPT | Performed by: STUDENT IN AN ORGANIZED HEALTH CARE EDUCATION/TRAINING PROGRAM

## 2019-05-23 PROCEDURE — 40000556 ZZH STATISTIC PERIPHERAL IV START W US GUIDANCE

## 2019-05-23 PROCEDURE — 25000125 ZZHC RX 250: Performed by: STUDENT IN AN ORGANIZED HEALTH CARE EDUCATION/TRAINING PROGRAM

## 2019-05-23 PROCEDURE — 25000128 H RX IP 250 OP 636

## 2019-05-23 PROCEDURE — 25000132 ZZH RX MED GY IP 250 OP 250 PS 637: Performed by: HOSPITALIST

## 2019-05-23 PROCEDURE — 25000128 H RX IP 250 OP 636: Performed by: HOSPITALIST

## 2019-05-23 PROCEDURE — 83735 ASSAY OF MAGNESIUM: CPT | Performed by: STUDENT IN AN ORGANIZED HEALTH CARE EDUCATION/TRAINING PROGRAM

## 2019-05-23 PROCEDURE — 94640 AIRWAY INHALATION TREATMENT: CPT

## 2019-05-23 PROCEDURE — 97530 THERAPEUTIC ACTIVITIES: CPT | Mod: GP | Performed by: PHYSICAL THERAPIST

## 2019-05-23 RX ORDER — FUROSEMIDE 10 MG/ML
40 INJECTION INTRAMUSCULAR; INTRAVENOUS ONCE
Status: COMPLETED | OUTPATIENT
Start: 2019-05-23 | End: 2019-05-23

## 2019-05-23 RX ORDER — METHYLPREDNISOLONE SODIUM SUCCINATE 40 MG/ML
40 INJECTION, POWDER, LYOPHILIZED, FOR SOLUTION INTRAMUSCULAR; INTRAVENOUS EVERY 12 HOURS
Status: DISCONTINUED | OUTPATIENT
Start: 2019-05-23 | End: 2019-05-24

## 2019-05-23 RX ORDER — CLONAZEPAM 0.5 MG/1
1 TABLET ORAL AT BEDTIME
Status: DISCONTINUED | OUTPATIENT
Start: 2019-05-23 | End: 2019-05-24

## 2019-05-23 RX ORDER — AMOXICILLIN 250 MG
1 CAPSULE ORAL 2 TIMES DAILY
Status: DISCONTINUED | OUTPATIENT
Start: 2019-05-23 | End: 2019-05-26

## 2019-05-23 RX ORDER — PANTOPRAZOLE SODIUM 40 MG/1
40 TABLET, DELAYED RELEASE ORAL
Status: DISCONTINUED | OUTPATIENT
Start: 2019-05-24 | End: 2019-05-29 | Stop reason: HOSPADM

## 2019-05-23 RX ORDER — AMOXICILLIN 250 MG
2 CAPSULE ORAL 2 TIMES DAILY
Status: DISCONTINUED | OUTPATIENT
Start: 2019-05-23 | End: 2019-05-26

## 2019-05-23 RX ORDER — DIAZEPAM 10 MG/2ML
2.5 INJECTION, SOLUTION INTRAMUSCULAR; INTRAVENOUS EVERY 4 HOURS PRN
Status: DISCONTINUED | OUTPATIENT
Start: 2019-05-23 | End: 2019-05-23

## 2019-05-23 RX ORDER — LORAZEPAM 2 MG/ML
1 INJECTION INTRAMUSCULAR ONCE
Status: COMPLETED | OUTPATIENT
Start: 2019-05-23 | End: 2019-05-23

## 2019-05-23 RX ORDER — HALOPERIDOL 5 MG/ML
1-2 INJECTION INTRAMUSCULAR EVERY 4 HOURS PRN
Status: DISCONTINUED | OUTPATIENT
Start: 2019-05-23 | End: 2019-05-29 | Stop reason: HOSPADM

## 2019-05-23 RX ORDER — HYDRALAZINE HYDROCHLORIDE 20 MG/ML
5-10 INJECTION INTRAMUSCULAR; INTRAVENOUS EVERY 6 HOURS PRN
Status: DISCONTINUED | OUTPATIENT
Start: 2019-05-23 | End: 2019-05-24

## 2019-05-23 RX ADMIN — FOLIC ACID 1 MG: 1 TABLET ORAL at 10:37

## 2019-05-23 RX ADMIN — IPRATROPIUM BROMIDE AND ALBUTEROL SULFATE 3 ML: .5; 3 SOLUTION RESPIRATORY (INHALATION) at 12:24

## 2019-05-23 RX ADMIN — Medication 100 MG: at 10:38

## 2019-05-23 RX ADMIN — HEPARIN SODIUM 1500 UNITS/HR: 10000 INJECTION, SOLUTION INTRAVENOUS at 00:39

## 2019-05-23 RX ADMIN — LORAZEPAM 1 MG: 2 INJECTION INTRAMUSCULAR; INTRAVENOUS at 03:33

## 2019-05-23 RX ADMIN — IPRATROPIUM BROMIDE AND ALBUTEROL SULFATE 3 ML: .5; 3 SOLUTION RESPIRATORY (INHALATION) at 16:29

## 2019-05-23 RX ADMIN — DIAZEPAM 2.5 MG: 5 INJECTION, SOLUTION INTRAMUSCULAR; INTRAVENOUS at 11:23

## 2019-05-23 RX ADMIN — CLONAZEPAM 1 MG: 0.5 TABLET ORAL at 21:14

## 2019-05-23 RX ADMIN — LORAZEPAM 1 MG: 2 INJECTION INTRAMUSCULAR; INTRAVENOUS at 00:19

## 2019-05-23 RX ADMIN — OLANZAPINE 10 MG: 10 TABLET, FILM COATED ORAL at 10:38

## 2019-05-23 RX ADMIN — HALOPERIDOL LACTATE 2 MG: 5 INJECTION INTRAMUSCULAR at 15:50

## 2019-05-23 RX ADMIN — IPRATROPIUM BROMIDE AND ALBUTEROL SULFATE 3 ML: .5; 3 SOLUTION RESPIRATORY (INHALATION) at 09:24

## 2019-05-23 RX ADMIN — METHYLPREDNISOLONE SODIUM SUCCINATE 40 MG: 40 INJECTION, POWDER, LYOPHILIZED, FOR SOLUTION INTRAMUSCULAR; INTRAVENOUS at 18:13

## 2019-05-23 RX ADMIN — ACETAMINOPHEN 975 MG: 325 TABLET, FILM COATED ORAL at 19:33

## 2019-05-23 RX ADMIN — METHYLPREDNISOLONE SODIUM SUCCINATE 40 MG: 40 INJECTION, POWDER, LYOPHILIZED, FOR SOLUTION INTRAMUSCULAR; INTRAVENOUS at 05:03

## 2019-05-23 RX ADMIN — OLANZAPINE 10 MG: 10 TABLET, FILM COATED ORAL at 19:33

## 2019-05-23 RX ADMIN — FUROSEMIDE 40 MG: 10 INJECTION, SOLUTION INTRAVENOUS at 05:03

## 2019-05-23 RX ADMIN — LEVOTHYROXINE SODIUM 50 MCG: 25 TABLET ORAL at 10:37

## 2019-05-23 RX ADMIN — IPRATROPIUM BROMIDE AND ALBUTEROL SULFATE 3 ML: .5; 3 SOLUTION RESPIRATORY (INHALATION) at 20:34

## 2019-05-23 RX ADMIN — LORAZEPAM 1 MG: 2 INJECTION INTRAMUSCULAR; INTRAVENOUS at 05:33

## 2019-05-23 ASSESSMENT — MIFFLIN-ST. JEOR: SCORE: 1114.62

## 2019-05-23 ASSESSMENT — ACTIVITIES OF DAILY LIVING (ADL)
ADLS_ACUITY_SCORE: 10.5
ADLS_ACUITY_SCORE: 15
ADLS_ACUITY_SCORE: 10.5
ADLS_ACUITY_SCORE: 14

## 2019-05-23 NOTE — CONSULTS
Consult Date:  05/23/2019      DATE OF SERVICE:  05/23/2019.      REQUESTING SOURCE:  The [facility] Team.      IDENTIFYING DATA:  This is a 48-year-old woman seen today for a psychiatric consultation regarding her admission for a very significant drug overdose.      HISTORY OF PRESENT ILLNESS:  Ms. Wharton has a long history of bipolar disorder with associated anxiety problems and substance abuse, as well as many overdose attempts, is admitted following another overdose.  She apparently had taken handfuls of clonidine 0.1 mg along with Coreg 6.25 mg over several hours.  She also took her prescribed psychiatric medications, but apparently not in overdose.  She happened to call a friend, on the evening of admission; she noted slurred speech, then called EMTs.  Her friend mentioned to the admitting team that she has been binge drinking over the preceding weekend, but does not think she was intoxicated at the time of the overdose.  She has had a very stormy hospital course, including a prolonged ICU stay.  She self-intubated last night, during a period of agitation, but at this point she has settled down.  During my interview, she was calm and was cooperative with questions, except for a few times she sort of shut down, would not respond to further questions.  She said her mood has been quite depressed lately.  She has been very lonesome, since she lives alone.  She had been having problems with sleep difficulties, low appetite, loss of interest in usual activities, and hopeless, at times suicidal.  Here in the hospital, she says she is still feeling depressed, but is not suicidal.  She feels safe, is not concerned about harming herself here, but appreciates the bedside attendant.  She has quite a bit of anxiety, but she denies any recent panic attacks. I cannot elicit a history of significant moods swings. As far as I can tell, during her ICU stay, she may have had some visual hallucinations consistent with delirium, but  at this point she denies any hallucinations.      PAST PSYCHIATRIC HISTORY:  She has had at least 4 overdoses with multiple psychiatric hospitalizations.  In reviewing the chart, it appears that the last one here at our institution was 03/09/2017, under the care of Dr. Rodriguez.  That was precipitated by an altercation she was having with the police after being off of her psychiatric medications for quite some time.  She sees Dr. Jaden Rodriguez, as an outpatient.      Her past medication trials are listed in her admission note from 03/2017.  I believe attempts have been made for her to have a day program versus therapist at [facility].  She says this has not occurred yet.      SUBSTANCE USE HISTORY:  Does have problems with alcohol use, but says for the most part, she has been sober over the last couple of years.  However, admits to binge drinking more recently.  She says the only time that she is not lonesome is when she is out drinking alcohol.  Reports cannabis use in the more remote past.  Is a current cigarette smoker.  She has had at least 1 chemical dependency treatment. She denies problems with severe alcohol withdrawal.      PAST MEDICAL HISTORY:  History of C. diff colitis, essential hypertension, GE reflux, hypothyroidism, migraine.      PAST SURGICAL HISTORY:  For pneumothorax, left shoulder, and left knee surgeries.      MULTIPLE ALLERGIES; SEE EMR.      OUTPATIENT PSYCHIATRIC MEDICINES:  Celexa 20 mg per day, recently started. Klonopin 1 mg twice a day and 2 mg at bedtime; per pharmacy database, she fills # 120 per month.  Clonidine 0.1 mg twice a day.  Olanzapine 10 mg twice a day.      REVIEW OF SYSTEMS:  A 10-point review of systems today is negative, except for generalized malaise and a sore throat from intubation.      FAMILY HISTORY:  According to chart, there is some mental illness in the family including her mother, but when I asked her about this, she would not respond to my question.  Positive for chemical dependency issues in her family.      SOCIAL HISTORY:  She says she is a native of [place], [place], and grew up with 1 brother.  She has a 17-year-old son, who is living with Ana Laura's ex- who has full custody.  She said they were most recently  about a year ago, but she has had a boyfriend for approximately 1 year.  She says she lives by herself and is on Social Security Disability, does not identify any close family members.      MENTAL STATUS EXAMINATION:  She is seen in an ICU bed, and does appear quite drowsy, but she is overall cooperative with questions.  Speech is fluent, although she is a bit hoarse.  She moves her upper extremities without difficulty.  She is well groomed.  Thought process is directed.  Associations tight.  Denies delusions or hallucinations.  Mood is depressed.  Affect is quite restricted.  She was just 1 day off on the day and date; was oriented to place and year.  She had some trouble with world backward, serial 3s, and past Presidents, but overall her attention span and concentration, as well as memory were mostly intact.  Use of language, fund of knowledge within normal limits.  Insight and judgment fair to poor.      VITAL SIGNS:  Blood pressure 158/90, pulse 108, temperature 98.2.      DIAGNOSES:   1.  Bipolar 1 disorder, by history.   2.  Alcohol and cannabis use disorders.     3.  Likely cluster B traits.     4.  Per chart, there is a history of opiate dependence.      IMPRESSION:  She presents following a very significant suicide attempt in the context of multiple prior suicide attempts.  At this point, she is improving medically, but it appears that she will need a few more days of stabilization.  At this point, I believe she still needs a bedside attendant, may need a transfer to inpatient Psychiatry.  She remains a bit groggy and still is having some problems with focusing and memory, etc.  At this point, I do not believe she is  quite decisional, but I expect her to come around in the next day or so, in regards to this.  She has received several doses of benzodiazepines due to nursing concerns over withdrawal.       RECOMMENDATIONS:   1.  I would continue on the Zyprexa 10 mg b.i.d., and add Klonopin 1 mg HS to cover for the remote possibility of benzodiazepine withdrawal.   2.  Haldol 1 to 2 mg IV every 4 hours for severe agitation.   3.  I will check in her tomorrow to further assess for decision-making capacity     4.  Page me at [phone] as needed.         TIMOTHY KUMAR MD             D: 2019   T: 2019   MT: VERN      Name:     KEE TINEO   MRN:      [id number]        Account:       [id number]   :      1970           Consult Date:  2019      Document: [id number]       cc: Jaden Rodriguez MD

## 2019-05-23 NOTE — PLAN OF CARE
D: Pt admitted following intentional overdose    I/A:   Neuro: Pt alert overnight, oriented to place and person, requiring reorientation to time and situation.  CAM+, MD aware.  In AM pt was increasingly restless, complaining of anxiety and tugging at lines.  Per MD, pt may be in benzodiazepine withdrawal, PRN ativan given.  Pt states she feels overwhelmed but denies suicidal ideation, states she has good support from S/O.   Resp: Self extubated in evening, initially on oxyplus mask, now on 3L NC.  LS clear to coarse, good productive cough.     Cardio: Sinus rhythm, pressures stable.  No concerns.   GI: Pt had multiple bowel meds during the day, large amount of loose stool overnight.  Rectal pouch placed.  Pt NPO, tube feeds off following OG removal.  Failed RN bedside swallow, will need speech eval.   : Good UO to patel catheter, ~100/hr.  Per MD, patel kept in place overnight, to be pulled this AM.   Skin: No concerns.     P: Continue to monitor mental status, speech consult for swallow eval, potentially stable to transfer out of ICU.

## 2019-05-23 NOTE — PLAN OF CARE
4C: Discharge Planner PT   Patient plan for discharge: not discussed  Current status: pt now extubated, on room air. Pt lethargic, often slow to respond to questions. Pt completes bed mobility with SBA, stands with SBA impulsively multiple times, needs cues to wait for proper assistance. Pt ambulates ~ 20ft with mod A and hand hold, unsteady on feet, limited by feeling emotional and wanting to return to bed.   Barriers to return to prior living situation: medical status, psych status, assist for mobility   Recommendations for discharge: anticipate rehab needs   Rationale for recommendations: pending medical course and progress of mobility, may require IP psych        Entered by: Edna Zamudio 05/23/2019 4:38 PM

## 2019-05-23 NOTE — PROGRESS NOTES
"   05/23/19 0942   General Information   Onset Date 05/11/19   Start of Care Date 05/23/19   Referring Physician Duane Hill MD   Patient/Family Goals Statement Per pt 'I want to eat and drink\"   Swallowing Evaluation Bedside swallow evaluation   Behaviorial Observations Lethargic;Confused   Mode of current nutrition NPO   Respiratory Status O2 Supply   Type of O2 supply Nasal cannula  (3 LPM O2)   Comments Pt is a 48 year old female with a PMHx significant for bipolar I d/o, anxiety, depression, recurrent UTIs, and HTN who is being transferred from [facility] ED for medication overdose as a suicide attempt.  Pt was intubated on 5/11/2019, and self-extubated on 5/22/19 in the PM.  Hx of reflux.  No hx of swallowing deficits per pt report and chart review.  Clinical swallow evaluation completed per MD order.   Clinical Swallow Evaluation   Oral Musculature anomalies present;other (see comments)  (left central IJ on cheek, pt report pulling musculature)   Structural Abnormalities none present   Dentition present and adequate   Mucosal Quality sticky   Mandibular Strength and Mobility intact   Oral Labial Strength and Mobility impaired retraction;other (see comments)  (Limited L retraction, suspect 2/2 IJ)   Lingual Strength and Mobility impaired right lateral movement  (suspect 2/2 left central IJ)   Buccal Strength and Mobility intact   Laryngeal Function Cough;Throat clear;Swallow;Voicing initiated  (Mild hoarse vocal quality)   Oral Musculature Comments Oral mech remarkable for mild hoarse vocal quality, impaired mobility of oral musculature suspect due to left lateral IJ   Additional Documentation Yes   Swallow Eval   Feeding Assistance frequent cues/help required   Clinical Swallow Eval: Thin Liquid Texture Trial   Mode of Presentation, Thin Liquids spoon;fed by clinician   Volume of Liquid or Food Presented 3 ice chips   Oral Phase of Swallow Premature pharyngeal entry   Pharyngeal Phase of Swallow " impaired;coughing/choking   Diagnostic Statement High aspiration risk   Clinical Swallow Eval: Nectar Thick Liquid Texture Trial   Mode of Presentation, Nectar cup;spoon;fed by clinician   Volume of Nectar Presented 3 oz   Oral Phase, Nectar Premature pharyngeal entry  (via cup)   Pharyngeal Phase, Nectar impaired;intact;coughing/choking  (intact via spoon, coughing via cup)   Diagnostic Statement High aspiration risk with nectar via cup, WFL for consumpion via spoon   Clinical Swallow Eval: Honey Thick Liquid Texture Trial   Mode of Presentation, Honey cup;fed by clinician;self-fed   Volume of Honey Presented 2 oz   Oral Phase, Honey WFL   Pharyngeal Phase, Honey impaired;coughing/choking  (cough x1, otherwise no overt s/sx of aspiration)   Diagnostic Statement Delayed cough x1 with larger sip via cup, pt with inconsistent ability to take small controlled sips   Clinical Swallow Eval: Puree Solid Texture Trial   Mode of Presentation, Puree spoon;self-fed;fed by clinician   Volume of Puree Presented 4 tsp   Pharyngeal Phase, Puree feeling of something stuck in throat;impaired  (x2)   Successful Strategies Trialed During Procedure, Puree other (see comments)  (double swallow, liquid rinse)   Diagnostic Statement High aspiration risk   Clinical Swallow Eval: Semisolid Texture Trial   Mode of Presentation, Semisolid fed by clinician;self-fed   Volume of Semisolid Food Presented 1 bite semisolid   Oral Phase, Semisolid WFL   Pharyngeal Phase, Semisolid impaired;feeling of something stuck in throat   Diagnostic Statement High aspiration risk   Swallow Compensations   Swallow Compensations Pacing;Multiple swallow;Reduce amounts  (liquid rinse)   Results Suspect aspiration   Esophageal Phase of Swallow   Patient reports or presents with symptoms of esophageal dysphagia Yes   Esophageal comments Hx of reflux   General Therapy Interventions   Planned Therapy Interventions Dysphagia Treatment   Dysphagia treatment Modified  diet education;Instruction of safe swallow strategies;Compensatory strategies for swallowing   Intervention Comments PO tolerance, diet advancement   Swallow Eval: Clinical Impressions   Skilled Criteria for Therapy Intervention Skilled criteria met.  Treatment indicated.   Functional Assessment Scale (FAS) 4   Treatment Diagnosis Mild-moderate oropharyngeal dysphagia   Diet texture recommendations Nectar thick liquids   Recommended Feeding/Eating Techniques check mouth frequently for oral residue/pocketing;maintain upright posture during/after eating for 30 mins;small sips/bites;no straws   Demonstrates Need for Referral to Another Service physical therapy;occupational therapy   Therapy Frequency daily   Predicted Duration of Therapy Intervention (days/wks) 2 weeks   Anticipated Discharge Disposition other (see comments)  (defer to PT/OT)   Risks and Benefits of Treatment have been explained. Yes   Patient, family and/or staff in agreement with Plan of Care Yes   Clinical Impression Comments Clinical swallow evaluation completed per MD order.  Pt presented with mild-moderate oropharyngeal dysphagia in the setting of prolonged intubation following suicide attempt.  Recommend clear nectar thick liquid diet by spoon only, with 1:1 supervision.  Recommend critical meds crushed in puree, with use of additional swallow with each bite and liquid rinse.  Pt to be fully alert and upright for all PO, taking small bites/sips at slow rate.  Pt should remain upright at least 30 min following PO intake.  Pt awake, with intermittent confusion and inconsistent ability to follow safe swallow precautions.  Oral mech remarkable for mild hoarse vocal quality, impaired mobility of oral musculature suspect due to left lateral IJ.  Consistent cough with ice chips, nectar via cup.  Cough x1 with larger sip of honey via cup.  No overt s/sx of aspiration with nectar via spoon or honey via cup in small controlled sips. Globus sensation at  thyroid notch with puree and semisolid texture.  WFL for limited amounts of puree consistency with use of double swallow and liquid rinse.  Oral motor skills WFL across consistencies.  SLP to follow for PO tolerance, diet advancement as appropriate.   Total Evaluation Time   Total Evaluation Time (Minutes) 10

## 2019-05-23 NOTE — PLAN OF CARE
Discharge Planner SLP   Patient plan for discharge: Pt did not state  Current status: Clinical swallow evaluation completed per MD order.  Pt presented with mild-moderate oropharyngeal dysphagia in the setting of prolonged intubation following suicide attempt.  Recommend clear nectar thick liquid diet by spoon only, with 1:1 supervision.  Recommend critical meds crushed in puree, with use of additional swallow with each bite and a liquid rinse.  Pt to be fully alert and upright for all PO, taking small bites/sips at slow rate.  Pt should remain upright at least 30 min following PO intake.  Pt awake, with intermittent confusion and inconsistent ability to follow safe swallow precautions.  Oral mech remarkable for mild hoarse vocal quality, impaired mobility of oral musculature suspect due to left lateral IJ.  Consistent cough with ice chips, nectar via cup.  Cough x1 with larger sip of honey via cup.  No overt s/sx of aspiration with nectar via spoon or honey via cup in small controlled sips. Globus sensation at thyroid notch with puree and semisolid texture.  WFL for limited amounts of puree consistency with use of double swallow and liquid rinse.  SLP to follow for PO tolerance, diet advancement as appropriate.  Barriers to return to prior living situation: Dysphagia, medical status, cognition?  Recommendations for discharge: Defer to PT/OT  Rationale for recommendations: Anticipate pt will meet goals prior to discharge       Entered by: Spring Childs 05/23/2019 10:00 AM

## 2019-05-23 NOTE — PROGRESS NOTES
Patient self extubated from CPAP.  When I examined the patient she is awake alert following commands she sounds no signs of respiratory distress.  We will continue to wean the patient from supplemental oxygen overnight and continue to monitor.  Encourage bronchial hygiene    Harlan Finn MD  Critical Care Services

## 2019-05-23 NOTE — PROGRESS NOTES
Pt self extubated at approximately 2030. Pt has a fair, productive cough. Currently sating okay on oxymask. Will continue to monitor.

## 2019-05-23 NOTE — PROGRESS NOTES
[facility], [facility] Intensive Care Progress Note  Date of Service (when I saw the patient): 05/23/2019    Past 24 Hrs:  Self extubated  Some hallucinations overnight, concern for withdrawal    Transfer to medicine     Assessment & Plan   Ana Laura Wharton is a 48 year old female with a PMHx significant for recurrent UTIs, HTN, bipolar I, and anxiety/depression who remains in the ICU after intentional medication overdose.  She remains intubated/sedated, but nearing ready for extubation pending sedation wean.      Neurologic  #Concern for Anoxic Brain Injury: EEG without seizure activity.  MRI without abnormalities.Starting to have purposeful movements, low suspicion for anoxic brain injury.   - Monitor neuro status    Sedation:   Propofol  Precedex  Fentanyl  Versed (minimize as able)   PRN ativan     #Suicide attempt  #Bipolar I, anxiety, depression  - Psych consult when medically stable   - Holding PTA citalopram 20mg every day, mirtazapine 60mg QPM              Consider resuming pending psych recs  - Sitter once awake    #EtOH Abuse: Heavy drinking prior to admission, long history of EtOH abuse  - IV thiamine and folate    Cardiovascular  #Clonidine/carvedilol overdose:   #Hypotension: lactic acidosis:  Patient reported taking 180 tabs total of both Coreg 6.25mg and clonidine 0.1mg on 5/11at 7pm; d/w poison control; s/p 10mg Narcan x2 and insulin gtt, intralipid, norepi, epi, phenyl, vasopressin, dopamine, and angiotensin 2.  Able to decreased Epi significantly after starting AT2.  Overnight 5/12 received methylene blue and additional fluids.  Lactate normalized.  Insulin gtt stopped overnight 5/14.  Echocardiograms during admission have shown normal EF. Now off pressors.      #Prolonged QTc  Patient presented w/ QTc of 507, decreased on recheck    Respiratory  #Acute hypoxic/hypercapnic respiratory failure requiring intubation:  #Bilateral pleural effusion: Long  period of intubation.  Has bilateral infiltrates NSIP, vs DIP with possible contribution of volume overload. Now extubated ans stable on room air.    - Continue steroids for with gentle wean.  40 pred in 48 hours when able to swallow.      - Diuresis as needed    Gastrointestinal    # Nutrition:   - Nutrition consulted for TF  - Clear liquids: nectar thick for now    #Ileus/constipation:  CT abd/pelvis w/ con showed large stool burden.  Bowel movements briefly resumed with enemas and bowel regimen.  Now minimal stool output.  5/22 aggressive bowel regimen started again.    - Continuing bowel regimen as needed    #Pancreatitis: Resolved   #Hx of alcohol induced pancreatitis   Lipase 1,100; per chart review patient was c/o abdominal pain prior to intubation, CT abd/pelvis w/ con showed no pancreatitis.  Per friend was drinking heavily prior to admission.  Lipase normalized on 5/14 recheck.     Infectious Disease   # Concern for aspiration/CAP:   # Fever:  CT chest with multifocal primarily central opacities but procal not elevated.  Resumed abx but considering other etiologies, now stopped.  Including drug, UTI, considered DAH, but bronch unrevealing.   - Cefepime stopped 5/22  - Follow up repeat UA  - Clutures    Antibiotics:  Cefepime 5/11-12 5/19-5/22  Flagyl 5/11-12  Ceftriaxone 5/13-18  Vancomycin 5/19-5/20    Cultures:  MRSA nares 5/12 negative, 5/19 negative  Blood cultures: 5/11 NGTD, 5/15 NGTD, 5/19 NGTD  UC 5/11 >100k colonies coag - staph (susceptabilities pending)  UC  5/12 NGTD  Sputum 5/15: mixed mina 5/19 NGTD  BAL 5/21 pending    Hematology  #Right internal jugular thrombus: suspected line associated 2/2 attempted line placement at OSH.  Interval increase on repeat US 5/14.  Started on heparin for this.    - Heparin drip, high intensity  - Consider oral when swallow returns    #Acute Normocytic Anemia:  Suspect initial drop was likely dilutional.  However interval drop 5/15-16 unclear cause.  No overt  bleeding at this time.  Possibly still related to fluid shifts or lab fluctuation.  Marrow suppression 2/2 illness not unlikely (retic index hypo proliferative) and repeated blood draws could be to blame. No sign of GIB currently.     - Required transfusion x3  - Peripheral smear without hemolysis  - Transfuse for hgb <7  - CTM    #Leukocytosis: Resolved.  Reactive vs infectious.  Stress due to incomplete sedation?  - See ID section  - CTM    Endocrine  #Glucose  - CTM    #Hypothyroidism   - Cont Synthroid     Renal/Electolytes/FEN  #Volume overload: 2/2 initial fluid resuscitation.  Very responsive to diuresis.  Nearing euvolemic.    - Albumin assisted diuresis   - Strict I/O  - Napoles in   - Goal net negative 1-2L daily. Lasix as needed    #Oliguria and EDUARDO:  Resolved.   BUN and creatinine mildly elevated on presentation.  FENa was 0.1%.  Consistent with prerenal picture.     #Hypernatremia: Resovled  Due to diuresis and salt load from tube feeds.   - Aggressively replaced water 5/17  - CTM    #Hx overactive bladder:  - Cont oxybutynin, hold mirabegron     #Hx frequent UTIs:  - Holding cipro 125mg every day for UTI ppx given prolonged QT   - Currently on CTX as above    ACID/BASE/ELECTROLYTES:   #Metabolic alkalosis: mild  - CTM    #Electrolytes:  - On protocol   - CTM    Skin Care  #Pressure injury coccyx  - Turns per nursing    Restraints: Restraints for medical healing needed: YES    Family update by me today: Not yet updated  Family: Ex  Gabriel was listed as emergency contact: [phone], boyfriend Nessa has been present at bedside [phone].  Mother is reportedly? estranged (per SO), but would like to be decision maker [phone].  Has 16 year old son who she has not spoken with and Brother Linden [phone]    Per  consult.  Brother Linden will be decision maker for now, however mother has expressed desire to assume this role.  Complex psycho social back drop.  Mother has been grossly appropriate  with staff thus far.  Appreciate SW assistance in this matter.     CODE: FULL    Dispo: ICU pending extubation    Patient discussed with staff attending, Dr. Rogers.  Please feel free to page with questions.    Plans for next 24 Hrs:  Transfer to medicine    Physical Exam   Temp: 98.3  F (36.8  C) Temp src: Oral Temp  Min: 97.6  F (36.4  C)  Max: 100.2  F (37.9  C) BP: 134/74 Pulse: 104 Heart Rate: 103 Resp: 29 SpO2: 98 % O2 Device: Nasal cannula Oxygen Delivery: 3 LPM  Vitals:    05/20/19 0500 05/21/19 0400 05/22/19 0200   Weight: 53.5 kg (117 lb 15.1 oz) 54.9 kg (121 lb 0.5 oz) 54.1 kg (119 lb 4.3 oz)     Ventilation Mode: CMV/AC  (Continuous Mandatory Ventilation/ Assist Control)  FiO2 (%): 30 %  Rate Set (breaths/minute): 14 breaths/min  Tidal Volume Set (mL): 500 mL  PEEP (cm H2O): 5 cmH2O  Pressure Support (cm H2O): 7 cmH2O  Oxygen Concentration (%): 30 %  Resp: 29    I/O last 3 completed shifts:  In: 3470.8 [I.V.:1584.8; NG/GT:896]  Out: 2835 [Urine:2835]    Gen: NAD  HEENT:AT/ NC, PERRL,  sclera anicteric  PULM/THORAX:Coarse breath sounds   CV:RRR, NMRG  ABD: Soft, non distended, normal bowel sounds  EXT: Warm,  Trace edema bilaterally  SKIN: Capillary refill normal  NEURO: Alert, oriented to person/place.  Non focal     Lines: No erythema or discharge at entry site for Central Venous Catheter  Current lines are required for patient management    Medications     IV fluid REPLACEMENT ONLY Stopped (05/17/19 1335)     HEParin 1,500 Units/hr (05/23/19 0400)     - MEDICATION INSTRUCTIONS -       - MEDICATION INSTRUCTIONS -       sodium chloride 10 mL/hr at 05/22/19 2335       fluticasone  1 spray Both Nostrils Daily     folic acid  1 mg Oral or Feeding Tube Daily     ipratropium - albuterol 0.5 mg/2.5 mg/3 mL  3 mL Nebulization 4x daily     levothyroxine  50 mcg Oral Daily     methylPREDNISolone  40 mg Intravenous Q8H     multivitamins w/minerals  15 mL Per Feeding Tube Daily     OLANZapine  10 mg Oral or Feeding  Tube BID     pantoprazole  40 mg Oral or Feeding Tube QAM     vitamin B1  100 mg Oral or Feeding Tube Daily       Data   Recent Labs   Lab 05/23/19  0340 05/22/19  1857 05/22/19  1509 05/22/19  0442  05/19/19  0434   WBC 13.4*  --  13.1* 11.0   < > 9.7   HGB 8.8*  --  8.2* 7.7*   < > 6.4*   MCV 96  --  97 96   < > 96   *  --  346 294   < > 210   INR  --   --   --   --   --  1.26*    139  --  137   < > 140   POTASSIUM 4.0 4.5  --  3.9   < > 4.4   CHLORIDE 104 103  --  102   < > 106   CO2 29 30  --  29   < > 32   BUN 29 31*  --  24   < > 12   CR 0.52 0.56  --  0.58   < > 0.66   ANIONGAP 6 5  --  6   < > 3   ISAURO 9.8 9.1  --  9.0   < > 7.9*   * 173*  --  137*   < > 126*   ALBUMIN 4.2  --   --  3.7   < > 2.2*   PROTTOTAL 9.1*  --   --  7.8   < > 5.6*   BILITOTAL 1.2  --   --  1.1   < > 1.4*   ALKPHOS 264*  --   --  241*   < > 248*   ALT 19  --   --  17   < > 18   AST 27  --   --  31   < > 35    < > = values in this interval not displayed.     Recent Results (from the past 24 hour(s))   XR Chest Port 1 View    Narrative    XR CHEST PORT 1 VW  5/22/2019 1:32 PM      HISTORY: intubated    COMPARISON: 5/19/2019, 5/20/2019.    FINDINGS: Single AP radiograph of the chest. Endotracheal tube tip  projects over the midthoracic trachea. Gastric tube extends beyond the  field-of-view. Left IJ central venous catheter tip projects over the  confluence of the brachiocephalic and SVC. Bilateral interstitial  streaky opacities. Stable small left pleural effusion with underlying  opacity. No pneumothorax. No acute osseous abnormality, visualized  abdomen is unremarkable.      Impression    IMPRESSION:  1. Endotracheal tube tip projects approximately 5 cm above the coleman.  2. Stable bilateral interstitial airspace opacities, edema versus  infection.  3. Stable small left pleural effusion with underlying opacity,  atelectasis versus developing consolidation.    I have personally reviewed the examination and initial  interpretation  and I agree with the findings.    DAVID VANCE MD

## 2019-05-23 NOTE — PLAN OF CARE
"  Problem: Mood Alteration Comorbidity  Goal: Mood Alteration Comorbidity  Description  Patient comorbidity will be monitored for signs and symptoms of Mood Alteration condition.  Problems will be absent, minimized or managed by discharge/transition of care.  5/23/2019 1820 by Edith Triplett, RN  Outcome: No Change  5/23/2019 0438 by Julia Velasquez, RN  Outcome: Improving     D: Patient here with overdose  I/A: Patient on 2L NC sating in the mid 90s. Lung sounds clear but diminished. A/O x4, but at times says inappropriate things. \"She stated that no one can help her and she needed to go where all the other people are that kill themselves.\" Sitter at bedside. SP a little hypertensive 150s/90s. MD notified, awaiting orders. Tachy in the low 100s, with no ectopy seen. Incontinent of urine and stool. Clear liquid diet with nectar thick liquids. Gave PRN haldol for agitation.   P: transfer to floor when able, continue to monitor mental status and chart changes.  "

## 2019-05-23 NOTE — CONSULTS
Patient seen and chart reviewed. Note dictated (initial)   Hold on OP psych meds for now except for Zyprexa 10 mg BID   Haldol 1-2 mg IV q 4 hours for severe agitation.  Klonopin 1 mg HS to cover for remote chance of benzodiazepine withdrawal   I will check on her tomorrow to further assess for capacity    page me at [phone] as needed

## 2019-05-24 ENCOUNTER — APPOINTMENT (OUTPATIENT)
Dept: PHYSICAL THERAPY | Facility: CLINIC | Age: 49
DRG: 987 | End: 2019-05-24
Attending: ANESTHESIOLOGY
Payer: COMMERCIAL

## 2019-05-24 ENCOUNTER — APPOINTMENT (OUTPATIENT)
Dept: SPEECH THERAPY | Facility: CLINIC | Age: 49
DRG: 987 | End: 2019-05-24
Attending: ANESTHESIOLOGY
Payer: COMMERCIAL

## 2019-05-24 LAB
ALBUMIN SERPL-MCNC: 4.2 G/DL (ref 3.4–5)
ALP SERPL-CCNC: 232 U/L (ref 40–150)
ALT SERPL W P-5'-P-CCNC: 31 U/L (ref 0–50)
ANION GAP SERPL CALCULATED.3IONS-SCNC: 10 MMOL/L (ref 3–14)
AST SERPL W P-5'-P-CCNC: 37 U/L (ref 0–45)
BILIRUB DIRECT SERPL-MCNC: 0.3 MG/DL (ref 0–0.2)
BILIRUB SERPL-MCNC: 1.1 MG/DL (ref 0.2–1.3)
BUN SERPL-MCNC: 43 MG/DL (ref 7–30)
CALCIUM SERPL-MCNC: 9.8 MG/DL (ref 8.5–10.1)
CHLORIDE SERPL-SCNC: 105 MMOL/L (ref 94–109)
CO2 SERPL-SCNC: 26 MMOL/L (ref 20–32)
CREAT SERPL-MCNC: 0.48 MG/DL (ref 0.52–1.04)
CRP SERPL-MCNC: 20 MG/L (ref 0–8)
ERYTHROCYTE [DISTWIDTH] IN BLOOD BY AUTOMATED COUNT: 17.6 % (ref 10–15)
GFR SERPL CREATININE-BSD FRML MDRD: >90 ML/MIN/{1.73_M2}
GLUCOSE SERPL-MCNC: 134 MG/DL (ref 70–99)
HCT VFR BLD AUTO: 30.8 % (ref 35–47)
HGB BLD-MCNC: 9.6 G/DL (ref 11.7–15.7)
LMWH PPP CHRO-ACNC: 0.48 IU/ML
MAGNESIUM SERPL-MCNC: 2.7 MG/DL (ref 1.6–2.3)
MCH RBC QN AUTO: 30.1 PG (ref 26.5–33)
MCHC RBC AUTO-ENTMCNC: 31.2 G/DL (ref 31.5–36.5)
MCV RBC AUTO: 97 FL (ref 78–100)
PHOSPHATE SERPL-MCNC: 3.6 MG/DL (ref 2.5–4.5)
PLATELET # BLD AUTO: 583 10E9/L (ref 150–450)
POTASSIUM SERPL-SCNC: 3.4 MMOL/L (ref 3.4–5.3)
PROT SERPL-MCNC: 9.3 G/DL (ref 6.8–8.8)
RBC # BLD AUTO: 3.19 10E12/L (ref 3.8–5.2)
SODIUM SERPL-SCNC: 141 MMOL/L (ref 133–144)
WBC # BLD AUTO: 16.6 10E9/L (ref 4–11)

## 2019-05-24 PROCEDURE — 25000128 H RX IP 250 OP 636: Performed by: HOSPITALIST

## 2019-05-24 PROCEDURE — 84100 ASSAY OF PHOSPHORUS: CPT | Performed by: STUDENT IN AN ORGANIZED HEALTH CARE EDUCATION/TRAINING PROGRAM

## 2019-05-24 PROCEDURE — 25000132 ZZH RX MED GY IP 250 OP 250 PS 637: Performed by: STUDENT IN AN ORGANIZED HEALTH CARE EDUCATION/TRAINING PROGRAM

## 2019-05-24 PROCEDURE — 99233 SBSQ HOSP IP/OBS HIGH 50: CPT | Mod: GC | Performed by: PEDIATRICS

## 2019-05-24 PROCEDURE — 40000275 ZZH STATISTIC RCP TIME EA 10 MIN

## 2019-05-24 PROCEDURE — 94640 AIRWAY INHALATION TREATMENT: CPT

## 2019-05-24 PROCEDURE — 12000001 ZZH R&B MED SURG/OB UMMC

## 2019-05-24 PROCEDURE — 80076 HEPATIC FUNCTION PANEL: CPT | Performed by: STUDENT IN AN ORGANIZED HEALTH CARE EDUCATION/TRAINING PROGRAM

## 2019-05-24 PROCEDURE — 25000125 ZZHC RX 250: Performed by: STUDENT IN AN ORGANIZED HEALTH CARE EDUCATION/TRAINING PROGRAM

## 2019-05-24 PROCEDURE — 25000132 ZZH RX MED GY IP 250 OP 250 PS 637: Performed by: ANESTHESIOLOGY

## 2019-05-24 PROCEDURE — 83735 ASSAY OF MAGNESIUM: CPT | Performed by: STUDENT IN AN ORGANIZED HEALTH CARE EDUCATION/TRAINING PROGRAM

## 2019-05-24 PROCEDURE — 99233 SBSQ HOSP IP/OBS HIGH 50: CPT | Performed by: PSYCHIATRY & NEUROLOGY

## 2019-05-24 PROCEDURE — 86140 C-REACTIVE PROTEIN: CPT | Performed by: STUDENT IN AN ORGANIZED HEALTH CARE EDUCATION/TRAINING PROGRAM

## 2019-05-24 PROCEDURE — 94640 AIRWAY INHALATION TREATMENT: CPT | Mod: 76

## 2019-05-24 PROCEDURE — 25800030 ZZH RX IP 258 OP 636: Performed by: HOSPITALIST

## 2019-05-24 PROCEDURE — 92526 ORAL FUNCTION THERAPY: CPT | Mod: GN

## 2019-05-24 PROCEDURE — 25000128 H RX IP 250 OP 636: Performed by: STUDENT IN AN ORGANIZED HEALTH CARE EDUCATION/TRAINING PROGRAM

## 2019-05-24 PROCEDURE — 25000132 ZZH RX MED GY IP 250 OP 250 PS 637: Performed by: HOSPITALIST

## 2019-05-24 PROCEDURE — 25000132 ZZH RX MED GY IP 250 OP 250 PS 637: Performed by: SURGERY

## 2019-05-24 PROCEDURE — 80048 BASIC METABOLIC PNL TOTAL CA: CPT | Performed by: STUDENT IN AN ORGANIZED HEALTH CARE EDUCATION/TRAINING PROGRAM

## 2019-05-24 PROCEDURE — 97530 THERAPEUTIC ACTIVITIES: CPT | Mod: GP | Performed by: PHYSICAL THERAPIST

## 2019-05-24 PROCEDURE — 85027 COMPLETE CBC AUTOMATED: CPT | Performed by: HOSPITALIST

## 2019-05-24 PROCEDURE — 25000128 H RX IP 250 OP 636

## 2019-05-24 PROCEDURE — 85520 HEPARIN ASSAY: CPT | Performed by: STUDENT IN AN ORGANIZED HEALTH CARE EDUCATION/TRAINING PROGRAM

## 2019-05-24 PROCEDURE — 36415 COLL VENOUS BLD VENIPUNCTURE: CPT | Performed by: STUDENT IN AN ORGANIZED HEALTH CARE EDUCATION/TRAINING PROGRAM

## 2019-05-24 RX ORDER — LORAZEPAM 2 MG/ML
2 INJECTION INTRAMUSCULAR ONCE
Status: COMPLETED | OUTPATIENT
Start: 2019-05-24 | End: 2019-05-24

## 2019-05-24 RX ORDER — LORAZEPAM 2 MG/1
2 TABLET ORAL EVERY 4 HOURS
Status: DISCONTINUED | OUTPATIENT
Start: 2019-05-24 | End: 2019-05-25

## 2019-05-24 RX ORDER — CARVEDILOL 3.12 MG/1
6.25 TABLET ORAL 2 TIMES DAILY WITH MEALS
Status: DISCONTINUED | OUTPATIENT
Start: 2019-05-24 | End: 2019-05-24

## 2019-05-24 RX ORDER — OLANZAPINE 5 MG/1
10 TABLET ORAL AT BEDTIME
Status: DISCONTINUED | OUTPATIENT
Start: 2019-05-24 | End: 2019-05-29 | Stop reason: HOSPADM

## 2019-05-24 RX ORDER — HYDRALAZINE HYDROCHLORIDE 20 MG/ML
5-10 INJECTION INTRAMUSCULAR; INTRAVENOUS EVERY 6 HOURS PRN
Status: DISCONTINUED | OUTPATIENT
Start: 2019-05-24 | End: 2019-05-29 | Stop reason: HOSPADM

## 2019-05-24 RX ORDER — CARVEDILOL 12.5 MG/1
12.5 TABLET ORAL 2 TIMES DAILY WITH MEALS
Status: DISCONTINUED | OUTPATIENT
Start: 2019-05-24 | End: 2019-05-28

## 2019-05-24 RX ADMIN — LORAZEPAM 2 MG: 2 TABLET ORAL at 22:31

## 2019-05-24 RX ADMIN — NICOTINE 1 PATCH: 7 PATCH TRANSDERMAL at 01:12

## 2019-05-24 RX ADMIN — IPRATROPIUM BROMIDE AND ALBUTEROL SULFATE 3 ML: .5; 3 SOLUTION RESPIRATORY (INHALATION) at 12:51

## 2019-05-24 RX ADMIN — ENOXAPARIN SODIUM 50 MG: 60 INJECTION SUBCUTANEOUS at 20:54

## 2019-05-24 RX ADMIN — SODIUM CHLORIDE, POTASSIUM CHLORIDE, SODIUM LACTATE AND CALCIUM CHLORIDE 1000 ML: 600; 310; 30; 20 INJECTION, SOLUTION INTRAVENOUS at 12:41

## 2019-05-24 RX ADMIN — OLANZAPINE 10 MG: 10 TABLET, FILM COATED ORAL at 08:23

## 2019-05-24 RX ADMIN — CARVEDILOL 6.25 MG: 3.12 TABLET, FILM COATED ORAL at 09:35

## 2019-05-24 RX ADMIN — IPRATROPIUM BROMIDE AND ALBUTEROL SULFATE 3 ML: .5; 3 SOLUTION RESPIRATORY (INHALATION) at 20:28

## 2019-05-24 RX ADMIN — HEPARIN SODIUM 1500 UNITS/HR: 10000 INJECTION, SOLUTION INTRAVENOUS at 03:36

## 2019-05-24 RX ADMIN — ENOXAPARIN SODIUM 50 MG: 60 INJECTION SUBCUTANEOUS at 12:38

## 2019-05-24 RX ADMIN — FLUTICASONE PROPIONATE 1 SPRAY: 50 SPRAY, METERED NASAL at 08:14

## 2019-05-24 RX ADMIN — LEVOTHYROXINE SODIUM 50 MCG: 25 TABLET ORAL at 08:21

## 2019-05-24 RX ADMIN — CARVEDILOL 12.5 MG: 12.5 TABLET, FILM COATED ORAL at 18:04

## 2019-05-24 RX ADMIN — LORAZEPAM 2 MG: 2 INJECTION, SOLUTION INTRAMUSCULAR; INTRAVENOUS at 15:26

## 2019-05-24 RX ADMIN — IPRATROPIUM BROMIDE AND ALBUTEROL SULFATE 3 ML: .5; 3 SOLUTION RESPIRATORY (INHALATION) at 16:35

## 2019-05-24 RX ADMIN — OLANZAPINE 10 MG: 10 TABLET, FILM COATED ORAL at 22:31

## 2019-05-24 RX ADMIN — NICOTINE 1 PATCH: 7 PATCH TRANSDERMAL at 08:15

## 2019-05-24 RX ADMIN — LORAZEPAM 2 MG: 2 TABLET ORAL at 18:04

## 2019-05-24 RX ADMIN — FOLIC ACID 1 MG: 1 TABLET ORAL at 08:22

## 2019-05-24 RX ADMIN — IPRATROPIUM BROMIDE AND ALBUTEROL SULFATE 3 ML: .5; 3 SOLUTION RESPIRATORY (INHALATION) at 08:57

## 2019-05-24 RX ADMIN — PANTOPRAZOLE SODIUM 40 MG: 40 TABLET, DELAYED RELEASE ORAL at 08:19

## 2019-05-24 RX ADMIN — Medication 100 MG: at 08:22

## 2019-05-24 RX ADMIN — METHYLPREDNISOLONE SODIUM SUCCINATE 40 MG: 40 INJECTION, POWDER, LYOPHILIZED, FOR SOLUTION INTRAMUSCULAR; INTRAVENOUS at 05:47

## 2019-05-24 ASSESSMENT — ACTIVITIES OF DAILY LIVING (ADL)
ADLS_ACUITY_SCORE: 12.5
ADLS_ACUITY_SCORE: 10.5
ADLS_ACUITY_SCORE: 12.5
ADLS_ACUITY_SCORE: 12.5

## 2019-05-24 ASSESSMENT — MIFFLIN-ST. JEOR: SCORE: 1097.62

## 2019-05-24 NOTE — PROGRESS NOTES
[facility]    Pulmonary, Allergy, Critical Care and Sleep Medicine    Follow-up Note  05/24/2019    Assessment and Recommendations:    Ana Laura Wharton is a 48 year old female with a history of Bipolar disorder, depression, anxiety, and prior suicide attempts who presented 5/11/2019 after intentional overdose of clonidine and carvedilol with intubation and prolonged ICU stay c/b cardiogenic shock requiring multiple vasopressors, ARDS, and possible pneumonia 2/2 aspiration vs CAP who self extubated 5/22 and transferred to Medicine 5/23.     CT chest 5/18 with diffuse consolidative opacities, bilateral effusions. Suspect due to multiple factors including recent overdose and ARDS. With overdose suspect period of greatly diminished cardiac function contributing to edema/volume as evidenced by effusions, by weights and I/Os now greatly improved though still with small effusions. Febrile and concern for possible pneumonia possibly due to aspiration. Did have hemoglobin drop and serial lavage performed without evidence of DAH, eosinophilia, or any growth on cultures. Steroids initiated 5/22 due to concern for inflammatory process. Self extubated and clinically has improved significantly, with wean down to room air, but with continued psychological concerns including period of suspected catatonia today. At this time would stop steroids given suspicion that infiltrates are due to prior ARDS, pulmonary edema, and possible aspiration. Would also like to avoid steroids in setting of potentially unstable psychological condition.    - Discontinue steroids  - Would continue nebulizers and once more interactive attempt to add Aerobika  - If having issues with secretions can also consider switching Duonebs to Albuterol only and adding a mucolytic  - Again once more interactive should complete PT/OT, regular incentive spirometry  - Should follow up outpatient with her PCP at discharge with a CXR to track  improvement, can be referred to Pulmonary outpatient if ongoing issues    Pulmonary will sign off at this time    Seen and discussed with Dr. Haider Mcknight MD PhD  Pulmonary and Critical Care Fellow   Pager [phone]     Subjective, Interval history:   Transferred from ICU to Medicine team. Able to wean down oxygen to room air. Disinhibited behaviors with ICU staff in am. At time of visit sitter and friend present. Patient initially seemed interactive but started and did not answer any questions. Treated with Ativan later in day for suspected catatonia.     Objective:   Medications:    carvedilol  12.5 mg Oral BID w/meals     enoxaparin  1 mg/kg (Dosing Weight) Subcutaneous BID     fluticasone  1 spray Both Nostrils Daily     folic acid  1 mg Oral or Feeding Tube Daily     ipratropium - albuterol 0.5 mg/2.5 mg/3 mL  3 mL Nebulization 4x daily     lactated ringers  1,000 mL Intravenous Once     levothyroxine  50 mcg Oral Daily     LORazepam  2 mg Oral Q4H     nicotine  1 patch Transdermal Daily     nicotine   Transdermal Q8H     [START ON 5/25/2019] nicotine   Transdermal Daily     OLANZapine  10 mg Oral or Feeding Tube At Bedtime     pantoprazole  40 mg Oral QAM AC     senna-docusate  1 tablet Oral BID    Or     senna-docusate  2 tablet Oral BID     vitamin B1  100 mg Oral or Feeding Tube Daily     acetaminophen, bisacodyl, glucose **OR** dextrose **OR** glucagon, haloperidol lactate, hydrALAZINE, naloxone, ondansetron, polyethylene glycol    Physical Exam:  Temp:  [98.4  F (36.9  C)-99  F (37.2  C)] 98.8  F (37.1  C)  Pulse:  [100-137] 118  Heart Rate:  [] 118  Resp:  [20-35] 30  BP: (140-179)/() 167/101  SpO2:  [90 %-100 %] 94 %    Intake/Output Summary (Last 24 hours) at 5/24/2019 1816  Last data filed at 5/24/2019 1800  Gross per 24 hour   Intake 1095 ml   Output 550 ml   Net 545 ml     General: laying in bed in NAD  HEENT: anicteric, R medial subconjunctival hemorrhage  Chest:  Mildly coarse breath sounds, no wheezing on anterior exam  Cardiac: RRR no murmurs  Neuro: Tracking appropriately, some nods/shakes of head, not answering questions   Skin: no rash noted    Labs and imaging: All laboratory and imaging data personally reviewed.

## 2019-05-24 NOTE — PLAN OF CARE
4C: Discharge Planner PT   Patient plan for discharge: not discussed   Current status: pt with decreased verbal responses during session today, not speaking at all during session but keeping eyes open/tracking, and following 10-20% of commands. Pt assisted to EOB with tactile cues and min A, completes STS and pivot to commode with min-mod A for balance/safey. Pt somewhat impulsively returning to bed, unable to follow commands consistently enough to allow for safe gait today.   Barriers to return to prior living situation: medical status, psych status, safety concerns   Recommendations for discharge: anticipate rehab needs  Rationale for recommendations: pt with below baseline physical mobility, quite limited by cognitive/psych state and waxing and waning command following/participation.        Entered by: Edna Zamudio 05/24/2019 12:37 PM

## 2019-05-24 NOTE — PLAN OF CARE
D: Pt admitted following intentional overdose, now extubated.    I/A:   Neuro: Pt alert overnight, disoriented x4 at times, CAM positive.  Very withdrawn at times, answering with single words or ignoring questions, refusing cares.  At other times, flirtatious and inappropriate, openly masturbating in front of sitter in AM.     Resp: LS dim/coarse in bases, stable on room air.    Cardio: Sinus tachycardia in evening, tele D/C'd overnight.  Hypertensive systolics 140s-170s, not requiring PRN hydralazine. Afebrile, no significant edema.   GI: 1x continent soft BM, tolerating clear liquid diet well.   : Bladder scanned for 350 mL overnight, pt able to get up to commode and empty    Skin: No concerns   Notes: K+ 3.4, no replacement protocol.    P: Pt continues to do well on clear liquid thickened diet, advance to full liquid?  Transfer to floor when able.    Problem: Mood Alteration Comorbidity  Goal: Mood Alteration Comorbidity  Description  Patient comorbidity will be monitored for signs and symptoms of Mood Alteration condition.  Problems will be absent, minimized or managed by discharge/transition of care.  5/24/2019 0453 by Julia Velasquez, RN  Outcome: No Change

## 2019-05-24 NOTE — PLAN OF CARE
Discharge Planner SLP   Patient plan for discharge: Pt unable to state  Current status:  Recommend cautions continuation of clear nectar thick liquid diet by spoon only, with 1:1 supervision.  Recommend critical meds crushed in puree, with use of additional swallow with each bite and a liquid rinse. Caregiver should verify each swallow per bite or sip, and hold PO if pt demonstrating overt s/sx of aspiration. Pt to be fully alert and upright for all PO, taking small bites/sips at slow rate.  Pt should remain upright at least 30 min following PO intake. Decreased JENNIFER and overt s/sx of aspiration across multiple consistencies impacting diet advancement.  SLP to follow for diet advancement and PO tolerance.  Barriers to return to prior living situation: Dysphagia, medical status, cognition?  Recommendations for discharge: Anticipate rehab needs  Rationale for recommendations: Below baseline function; pt will benefit from ongoing ST targeting swallowing       Entered by: Spring Childs 05/24/2019 12:54 PM

## 2019-05-24 NOTE — PROGRESS NOTES
"Transfer Acceptance Note   * see progress note for details of plan, changes noted below       Assessment/Plan:    # suicide attempt  # bipolar I/anxiety/depression  # delirium   QTc 432 on EKG 5/20.    - psychiatry consulted   - clonazepam 1 mg at bedtime  - zyprexa 10 mg BID   - haldol 1 - 2 mg PRN agitation   - 1:1 sitter    # shock secondary to BB (carvedilol) and clonidine overdose  Required multiple vasopressors with insulin gtt weaned off 5/14.  EF stable on ZAMZAM.    - methylpred 40 Q12, switch to pred in AM     # elevated BP  - hydralazine PRN   - holding PTA clonidine and coreg      Subjective:  Patient doing well this evening.  Friend visiting at bedside.  Noted that she hasn't sleep well.      Exam:  BP (!) 161/91   Pulse 101   Temp 98.5  F (36.9  C) (Oral)   Resp 23   Ht 1.657 m (5' 5.24\")   Wt 48 kg (105 lb 13.1 oz)   SpO2 98%   BMI 17.48 kg/m       General: moving around bed  Neuro: alert, speaking in full sentences, responding appropriately to questions  CV: tachycardic, regular  Pulmonary: CTAB, normal respiratory effort  Abdomen: soft, non-tender, non-distended   Extremity: wwp, no edema     "

## 2019-05-24 NOTE — PROGRESS NOTES
[facility], [place]    Progress Note - Lorenzo 5 Service        Date of Admission:  5/11/2019    Assessment & Plan   Ana Laura Wharton is a 48 year old female with a past medical history of bipolar disorder, depression, anxiety admitted on 5/11/2019 with suicide attempt with beta blocker and clonidine overdose.      # delirium  # hx bipolar/anxiety/depression  - psychiatry consulted   - clonazepam 1 mg at bedtime  - zyprexa 10 mg BID   - haldol 1 - 2 mg PRN agitation   - 1:1 sitter    # R internal jugular thrombus   Suspected to be line associated from attempted placement at outside hospital prior to transfer.    - heparin gtt -> lovenox 40 mg BID     # hx of EtOH abuse   - thiamine 100 mg daily   - folate 1 mg daily     # HTN  - carvedilol 12.5 mg BID (PTA dosing of 6.25 mg daily)   - hydralazine 5 - 10 mg PRN     # severe malnutrition  In the context of critical illness.  PO intake now limited by mental status.   - calorie counts   - may need to consider [place] placement if not meeting nutritional needs     # acute normocytic anemia   Received 3 unit(s) pRBC's.      # acute hypoxic/hypercapnic respiratory failure requiring intubation - resolved   # concern for aspiration/CAP - resolved  Self extubated 5/22.  Antibiotic courses listed below.   Cefepime 5/11-5/12, 5/19-5/22  Flagyl 5/11-5/12  Ceftriaxone 5/13-5/18  Vancomycin 5/19-5/20    # cardiogenic shock 2/2 intentional carvedilol and clonidine overdose - resolved  Reported taking 180 tabs carvedilol 6.25mg and clonidine 0.1 mg on 5/11 at 1900.  Required multiple vasopressors including insulin gtt and methylene blud.  Now off vasopressors and hemodynamically stable.      # hypothyroidism - continue PTA synthroid     # hx of recurrent UTI's - holding PTA ciprofloxacin     # pressure injury coccyx - hospital acquired      Diet: Clear Liquid Diet  Calorie Counts  Snacks/Supplements Adult: Boost Breeze; With Meals    Fluids: 1 L LR x 1  today   Lines: PIV   DVT Prophylaxis: Enoxaparin (Lovenox) SQ  Napoles Catheter: not present  Code Status: Full Code      Disposition Plan   Expected discharge: 4 - 7 days, recommended to inpatient psychiatry  once medically stable and meeting nutritional needs.  Entered: Kourtney Esposito MD 05/24/2019, 2:45 PM       The patient's care was discussed with the Attending Physician, Dr. Ramos.    Kourtney Esposito MD  [address], [place]  Pager: [phone]  Please see sticky note for cross cover information  ______________________________________________________________________    Interval History   No acute events overnight.  Nursing noted an episode of shaking with blank stare this morning, but patient was alert at the time.  She is non-verbal this morning and occasionally responding to questions.  Unable to assess ROS due to mental status.      Physical Exam   Vital Signs: Temp: 98.8  F (37.1  C) Temp src: Oral BP: (!) 171/102 Pulse: 113 Heart Rate: 97 Resp: 26 SpO2: 93 % O2 Device: None (Room air) Oxygen Delivery: 2 LPM  Weight: 102 lbs 1.17 oz  General: laying in bed, no acute distress, no verbal responses to questions  HEENT: nc/at, redness on medial side of right sclera  CV: tachycardic, regular  Pulmonary: CTAB, normal respiratory effort on room air  Abdomen: soft, non-tender, non-distended  Neuro: alert, occasionally responding to questions, moving all extremities   Psych: fearful look gazing into the distance    Data   Recent Labs   Lab 05/24/19  0357 05/23/19  0340 05/22/19  1857 05/22/19  1509  05/19/19  0434   WBC 16.6* 13.4*  --  13.1*   < > 9.7   HGB 9.6* 8.8*  --  8.2*   < > 6.4*   MCV 97 96  --  97   < > 96   * 455*  --  346   < > 210   INR  --   --   --   --   --  1.26*    139 139  --    < > 140   POTASSIUM 3.4 4.0 4.5  --    < > 4.4   CHLORIDE 105 104 103  --    < > 106   CO2 26 29 30  --    < > 32   BUN 43* 29 31*  --    < > 12   CR 0.48* 0.52 0.56  --     < > 0.66   ANIONGAP 10 6 5  --    < > 3   ISAURO 9.8 9.8 9.1  --    < > 7.9*   * 132* 173*  --    < > 126*   ALBUMIN 4.2 4.2  --   --    < > 2.2*   PROTTOTAL 9.3* 9.1*  --   --    < > 5.6*   BILITOTAL 1.1 1.2  --   --    < > 1.4*   ALKPHOS 232* 264*  --   --    < > 248*   ALT 31 19  --   --    < > 18   AST 37 27  --   --    < > 35    < > = values in this interval not displayed.     No results found for this or any previous visit (from the past 24 hour(s)).

## 2019-05-24 NOTE — PROGRESS NOTES
Nutrition Note: calorie counts    Calorie counts ordered by provider; however, pt is on a liquid diet which is not expected to fully meet pt's estimated kcal or protein needs. Unless diet advances to solid food, would not anticipate pt will be able to fully meet her nutritional needs via this restricted diet.    Intervention: ordered BID supplements to help optimize oral intake.    Ghislaine Montes RD, LD  ([facility] dietitian, pkl- [phone])

## 2019-05-24 NOTE — CONSULTS
Psychiatry Consultation; Follow up              Reason for Consult, requesting source:    Follow up on psychiatric status   Requesting source: Yomaira Ramos                 Interim history:    He came by to see her today to check on her cognitive status.  Earlier in the day apparently there was some disinhibited behaviors such as her masturbating in front of male staff.  However, by the time I came to see her she was essentially mute and staring at me.  She had marked posturing would leave her, arm in place for a long period of time after I placed it in a position that is normally a bit uncomfortable. Also with flexion of arms, hyper-reflexivity, but no clonus.   Due to concern over her having developed catatonia 2 mg of IV Ativan was ordered by the primary team.  She was reexamined approximately 20 minutes later and was more interactive but still with real paucity of speech.  Less waxy flexibility and per nursing she did assist with getting on the commode much better than earlier in the day.  Due to her prolonged low blood pressure there is concern about anoxic brain injury.         Medications:     Current Facility-Administered Medications   Medication     acetaminophen (TYLENOL) tablet 975 mg     bisacodyl (DULCOLAX) Suppository 10 mg     carvedilol (COREG) tablet 12.5 mg     clonazePAM (klonoPIN) tablet 1 mg     glucose gel 15-30 g    Or     dextrose 50 % injection 25-50 mL    Or     glucagon injection 1 mg     enoxaparin (LOVENOX) injection 50 mg     fluticasone (FLONASE) 50 MCG/ACT spray 1 spray     folic acid (FOLVITE) tablet 1 mg     haloperidol lactate (HALDOL) injection 1-2 mg     hydrALAZINE (APRESOLINE) injection 5-10 mg     ipratropium - albuterol 0.5 mg/2.5 mg/3 mL (DUONEB) neb solution 3 mL     lactated ringers BOLUS 1,000 mL     levothyroxine (SYNTHROID/LEVOTHROID) tablet 50 mcg     naloxone (NARCAN) injection 0.1-0.4 mg     nicotine (NICODERM CQ) 7 MG/24HR 24 hr patch 1 patch     nicotine  "Patch in Place     [START ON 5/25/2019] nicotine patch REMOVAL     OLANZapine (zyPREXA) tablet 10 mg     ondansetron (ZOFRAN) injection 4 mg     pantoprazole (PROTONIX) EC tablet 40 mg     polyethylene glycol (MIRALAX/GLYCOLAX) Packet 17 g     senna-docusate (SENOKOT-S/PERICOLACE) 8.6-50 MG per tablet 1 tablet    Or     senna-docusate (SENOKOT-S/PERICOLACE) 8.6-50 MG per tablet 2 tablet     vitamin B1 (THIAMINE) tablet 100 mg     (after receiving a dose of IV Ativan)          MSE:     Lying quietly in the hospital bed, flexion posturing and some tremor noted.  She is also hyperreflexic and has waxy flexibility.  She was staring but would close her eyes on command but rarely respond to verbal requests for her to talk.  Nodded her head when I asked if she was depressed.  She has markedly restricted affect.  Could not cooperate with remainder of mental status exam. Insight and judgment poor.    Vital signs:  Temp: 98.8  F (37.1  C) Temp src: Oral BP: (!) 174/93 Pulse: 112 Heart Rate: 118 Resp: 21 SpO2: 95 % O2 Device: None (Room air) Oxygen Delivery: 2 LPM Height: 165.7 cm (5' 5.24\") Weight: 46.3 kg (102 lb 1.2 oz)  Estimated body mass index is 16.86 kg/m  as calculated from the following:    Height as of this encounter: 1.657 m (5' 5.24\").    Weight as of this encounter: 46.3 kg (102 lb 1.2 oz).            DSM-5 Diagnosis:   Catatonia   Bipolar d/o, substance use history, cluster B traits           Assessment:   She did appear to have at least a modest response to the intravenous Ativan so I would continue to use this over at least the next 24 hours.  There is of course concern over her having something other than catatonia going on such as anoxic brain injury etc., so will need more of a work up if she does not improve with the Ativan.           Summary of Recommendations:   Prescribe p.o. Ativan 2 mg every 4 hours starting at 6 PM today and use it for a least 24 hours if she responding, but may hold if she has " "marked sedation. Continue it past 24 hours if she maintains improvement.   May discontinue the scheduled Klonopin at bedtime  Also discontinue the morning dose of Zyprexa but may continue with 10 mg at bedtime; this may help with sleep and I understand this was a problem last night.  If she does not have a significant improvement in her functioning I would reconsult neurology  Please reconsult psychiatry as needed (Dr Mccann is covering this weekend).    \"This dictation was performed with voice recognition software and may contain errors,  omissions and inadvertent word substitution.\"        "

## 2019-05-25 ENCOUNTER — APPOINTMENT (OUTPATIENT)
Dept: SPEECH THERAPY | Facility: CLINIC | Age: 49
DRG: 987 | End: 2019-05-25
Attending: ANESTHESIOLOGY
Payer: COMMERCIAL

## 2019-05-25 LAB
ANION GAP SERPL CALCULATED.3IONS-SCNC: 10 MMOL/L (ref 3–14)
BACTERIA SPEC CULT: NO GROWTH
BACTERIA SPEC CULT: NO GROWTH
BUN SERPL-MCNC: 44 MG/DL (ref 7–30)
CALCIUM SERPL-MCNC: 9.8 MG/DL (ref 8.5–10.1)
CHLORIDE SERPL-SCNC: 114 MMOL/L (ref 94–109)
CO2 SERPL-SCNC: 24 MMOL/L (ref 20–32)
CREAT SERPL-MCNC: 0.54 MG/DL (ref 0.52–1.04)
ERYTHROCYTE [DISTWIDTH] IN BLOOD BY AUTOMATED COUNT: 17.9 % (ref 10–15)
GFR SERPL CREATININE-BSD FRML MDRD: >90 ML/MIN/{1.73_M2}
GLUCOSE BLDC GLUCOMTR-MCNC: 128 MG/DL (ref 70–99)
GLUCOSE SERPL-MCNC: 130 MG/DL (ref 70–99)
HCT VFR BLD AUTO: 32.6 % (ref 35–47)
HGB BLD-MCNC: 9.8 G/DL (ref 11.7–15.7)
Lab: NORMAL
Lab: NORMAL
MAGNESIUM SERPL-MCNC: 2.3 MG/DL (ref 1.6–2.3)
MCH RBC QN AUTO: 29.6 PG (ref 26.5–33)
MCHC RBC AUTO-ENTMCNC: 30.1 G/DL (ref 31.5–36.5)
MCV RBC AUTO: 99 FL (ref 78–100)
PLATELET # BLD AUTO: 638 10E9/L (ref 150–450)
POTASSIUM SERPL-SCNC: 3.3 MMOL/L (ref 3.4–5.3)
POTASSIUM SERPL-SCNC: 4 MMOL/L (ref 3.4–5.3)
RBC # BLD AUTO: 3.31 10E12/L (ref 3.8–5.2)
SODIUM SERPL-SCNC: 144 MMOL/L (ref 133–144)
SODIUM SERPL-SCNC: 147 MMOL/L (ref 133–144)
SODIUM SERPL-SCNC: 149 MMOL/L (ref 133–144)
SPECIMEN SOURCE: NORMAL
SPECIMEN SOURCE: NORMAL
WBC # BLD AUTO: 14.6 10E9/L (ref 4–11)

## 2019-05-25 PROCEDURE — 92526 ORAL FUNCTION THERAPY: CPT | Mod: GN

## 2019-05-25 PROCEDURE — 99233 SBSQ HOSP IP/OBS HIGH 50: CPT | Performed by: PSYCHIATRY & NEUROLOGY

## 2019-05-25 PROCEDURE — 25000125 ZZHC RX 250: Performed by: STUDENT IN AN ORGANIZED HEALTH CARE EDUCATION/TRAINING PROGRAM

## 2019-05-25 PROCEDURE — 36415 COLL VENOUS BLD VENIPUNCTURE: CPT | Performed by: HOSPITALIST

## 2019-05-25 PROCEDURE — 25000132 ZZH RX MED GY IP 250 OP 250 PS 637: Performed by: STUDENT IN AN ORGANIZED HEALTH CARE EDUCATION/TRAINING PROGRAM

## 2019-05-25 PROCEDURE — 25800030 ZZH RX IP 258 OP 636: Performed by: HOSPITALIST

## 2019-05-25 PROCEDURE — 00000146 ZZHCL STATISTIC GLUCOSE BY METER IP

## 2019-05-25 PROCEDURE — 83735 ASSAY OF MAGNESIUM: CPT | Performed by: HOSPITALIST

## 2019-05-25 PROCEDURE — 25000132 ZZH RX MED GY IP 250 OP 250 PS 637: Performed by: HOSPITALIST

## 2019-05-25 PROCEDURE — 80048 BASIC METABOLIC PNL TOTAL CA: CPT | Performed by: HOSPITALIST

## 2019-05-25 PROCEDURE — 84295 ASSAY OF SERUM SODIUM: CPT | Performed by: HOSPITALIST

## 2019-05-25 PROCEDURE — 40000275 ZZH STATISTIC RCP TIME EA 10 MIN

## 2019-05-25 PROCEDURE — 25000132 ZZH RX MED GY IP 250 OP 250 PS 637: Performed by: ANESTHESIOLOGY

## 2019-05-25 PROCEDURE — 94640 AIRWAY INHALATION TREATMENT: CPT | Mod: 76

## 2019-05-25 PROCEDURE — 85027 COMPLETE CBC AUTOMATED: CPT | Performed by: HOSPITALIST

## 2019-05-25 PROCEDURE — 99233 SBSQ HOSP IP/OBS HIGH 50: CPT | Mod: GC | Performed by: PEDIATRICS

## 2019-05-25 PROCEDURE — 25000132 ZZH RX MED GY IP 250 OP 250 PS 637: Performed by: SURGERY

## 2019-05-25 PROCEDURE — 84132 ASSAY OF SERUM POTASSIUM: CPT | Performed by: HOSPITALIST

## 2019-05-25 PROCEDURE — 12000001 ZZH R&B MED SURG/OB UMMC

## 2019-05-25 PROCEDURE — 94640 AIRWAY INHALATION TREATMENT: CPT

## 2019-05-25 PROCEDURE — 25000128 H RX IP 250 OP 636: Performed by: HOSPITALIST

## 2019-05-25 PROCEDURE — 40000556 ZZH STATISTIC PERIPHERAL IV START W US GUIDANCE

## 2019-05-25 RX ORDER — POTASSIUM CHLORIDE 1.5 G/15ML
40 SOLUTION ORAL ONCE
Status: COMPLETED | OUTPATIENT
Start: 2019-05-25 | End: 2019-05-25

## 2019-05-25 RX ORDER — LORAZEPAM 0.5 MG/1
0.5 TABLET ORAL EVERY 4 HOURS
Status: COMPLETED | OUTPATIENT
Start: 2019-05-25 | End: 2019-05-26

## 2019-05-25 RX ORDER — AMLODIPINE BESYLATE 5 MG/1
5 TABLET ORAL DAILY
Status: DISCONTINUED | OUTPATIENT
Start: 2019-05-25 | End: 2019-05-26

## 2019-05-25 RX ORDER — POTASSIUM CL/LIDO/0.9 % NACL 10MEQ/0.1L
10 INTRAVENOUS SOLUTION, PIGGYBACK (ML) INTRAVENOUS
Status: DISPENSED | OUTPATIENT
Start: 2019-05-25 | End: 2019-05-25

## 2019-05-25 RX ORDER — DEXTROSE MONOHYDRATE 50 MG/ML
INJECTION, SOLUTION INTRAVENOUS CONTINUOUS
Status: DISCONTINUED | OUTPATIENT
Start: 2019-05-25 | End: 2019-05-26

## 2019-05-25 RX ADMIN — LORAZEPAM 1.5 MG: 1 TABLET ORAL at 17:39

## 2019-05-25 RX ADMIN — FLUTICASONE PROPIONATE 1 SPRAY: 50 SPRAY, METERED NASAL at 09:04

## 2019-05-25 RX ADMIN — Medication 100 MG: at 08:41

## 2019-05-25 RX ADMIN — ENOXAPARIN SODIUM 50 MG: 60 INJECTION SUBCUTANEOUS at 09:05

## 2019-05-25 RX ADMIN — HYDRALAZINE HYDROCHLORIDE 10 MG: 20 INJECTION INTRAMUSCULAR; INTRAVENOUS at 06:44

## 2019-05-25 RX ADMIN — CARVEDILOL 12.5 MG: 12.5 TABLET, FILM COATED ORAL at 17:39

## 2019-05-25 RX ADMIN — HYDRALAZINE HYDROCHLORIDE 10 MG: 20 INJECTION INTRAMUSCULAR; INTRAVENOUS at 00:36

## 2019-05-25 RX ADMIN — SENNOSIDES AND DOCUSATE SODIUM 1 TABLET: 8.6; 5 TABLET ORAL at 08:41

## 2019-05-25 RX ADMIN — IPRATROPIUM BROMIDE AND ALBUTEROL SULFATE 3 ML: .5; 3 SOLUTION RESPIRATORY (INHALATION) at 12:54

## 2019-05-25 RX ADMIN — FOLIC ACID 1 MG: 1 TABLET ORAL at 08:41

## 2019-05-25 RX ADMIN — IPRATROPIUM BROMIDE AND ALBUTEROL SULFATE 3 ML: .5; 3 SOLUTION RESPIRATORY (INHALATION) at 08:50

## 2019-05-25 RX ADMIN — POTASSIUM CHLORIDE 40 MEQ: 20 SOLUTION ORAL at 08:53

## 2019-05-25 RX ADMIN — ENOXAPARIN SODIUM 50 MG: 60 INJECTION SUBCUTANEOUS at 20:14

## 2019-05-25 RX ADMIN — DEXTROSE MONOHYDRATE: 50 INJECTION, SOLUTION INTRAVENOUS at 09:07

## 2019-05-25 RX ADMIN — LORAZEPAM 2 MG: 2 TABLET ORAL at 06:09

## 2019-05-25 RX ADMIN — CARVEDILOL 12.5 MG: 12.5 TABLET, FILM COATED ORAL at 08:41

## 2019-05-25 RX ADMIN — OLANZAPINE 10 MG: 10 TABLET, FILM COATED ORAL at 22:20

## 2019-05-25 RX ADMIN — HYDRALAZINE HYDROCHLORIDE 10 MG: 20 INJECTION INTRAMUSCULAR; INTRAVENOUS at 12:34

## 2019-05-25 RX ADMIN — LORAZEPAM 2 MG: 2 TABLET ORAL at 01:40

## 2019-05-25 RX ADMIN — IPRATROPIUM BROMIDE AND ALBUTEROL SULFATE 3 ML: .5; 3 SOLUTION RESPIRATORY (INHALATION) at 16:28

## 2019-05-25 RX ADMIN — LORAZEPAM 0.5 MG: 0.5 TABLET ORAL at 22:20

## 2019-05-25 RX ADMIN — AMLODIPINE BESYLATE 5 MG: 5 TABLET ORAL at 09:16

## 2019-05-25 RX ADMIN — LEVOTHYROXINE SODIUM 50 MCG: 25 TABLET ORAL at 08:41

## 2019-05-25 RX ADMIN — PANTOPRAZOLE SODIUM 40 MG: 40 TABLET, DELAYED RELEASE ORAL at 08:41

## 2019-05-25 RX ADMIN — NICOTINE 1 PATCH: 7 PATCH TRANSDERMAL at 08:55

## 2019-05-25 RX ADMIN — LORAZEPAM 2 MG: 2 TABLET ORAL at 09:15

## 2019-05-25 ASSESSMENT — ACTIVITIES OF DAILY LIVING (ADL)
ADLS_ACUITY_SCORE: 12.5

## 2019-05-25 ASSESSMENT — MIFFLIN-ST. JEOR: SCORE: 1100.62

## 2019-05-25 NOTE — PROGRESS NOTES
Pt has orders to transfer to , pending room assignment.  Pt remains hypertensive. PO amlodipine started today, PRN hydralazine given x 1 today (for SBP greater than 160, DBP greater than 110).   Pt's mentation waxes and wanes. This morning, pt was awake, alert, answering questions. At 11:00, pt was very sleepy and hard to wake. Dr. Esposito notified and examined pt at bedside. Scheduled Ativan was decreased from 2 mg to 1.5 mg every 4 hours.   Capnography was ordered.  Pt has a dysphagia level 2 diet with nectar thick liquids, tolerating well but poor appetite.   Neuro Consult and Psych Consult today, see chart.  Pt remains with 1:1 sitter for suicide precautions, but pt has not verbalized any suicidal thoughts or plans this shift.   Pt was awake and alert at 17:00, slightly restless and anxious. Scheduled Ativan given.   D5 at 75 ml/hr for elevated sodium this morning (149).  Current sodium=144.

## 2019-05-25 NOTE — PROGRESS NOTES
Calorie Count    Intake recorded for: 5/24/19  Total Kcals: 180 Total Protein: 0g    Kcals from Hospital Food: 180   Protein: 0g    Kcals from Outside Food (average):0 Protein: 0g    # Meals Recorded: Zero meals ordered from kitchen -- 100% applesauce, pudding    # Supplements Recorded: no intake recorded

## 2019-05-25 NOTE — PROGRESS NOTES
[facility], [place]    Progress Note - Lorenzo 5 Service        Date of Admission:  5/11/2019    Assessment & Plan   Ana Laura Wharton is a 48 year old female with a past medical history of bipolar disorder, depression, anxiety admitted on 5/11/2019 with suicide attempt with beta blocker and clonidine overdose.      # delirium  # hx bipolar/anxiety/depression  # concern for catatonia  Mental status waxing and waning following extubation.  Psychiatry evaluated and was concerned for catatonia.  Mental status improve and more verbal following ativan.  Plan for Neurology consultation to evaluate for other causes such an anoxic brain injury given recent critical illness.     - Psychiatry consulted  - Neurology consulted   - ativan 1.5 mg Q4H (decreased from 2 mg on 5/25), consider transition to klonopin per psychiatry recs in AM   - zyprexa 10 mg QHS  - haldol 1 - 2 mg PRN agitation   - 1:1 sitter    # hypernatremia  Secondary to decreased PO intake.    - D5 gtt @ 75 ml/hr  - Q8H Na checks     # R internal jugular thrombus   Suspected to be line associated from attempted placement at outside hospital prior to transfer.    - lovenox 40 mg BID     # hx of EtOH abuse   - thiamine 100 mg daily   - folate 1 mg daily     # HTN  - carvedilol 12.5 mg BID (PTA dosing of 6.25 mg daily)   - amlodipine 5 mg daily (started 5/25)   - hydralazine 5 - 10 mg PRN     # severe malnutrition  In the context of critical illness.  PO intake now limited by mental status.   - calorie counts   - may need to consider [place] placement if not meeting nutritional needs     # acute normocytic anemia   Received 3 unit(s) pRBC's.      # acute hypoxic/hypercapnic respiratory failure requiring intubation - resolved   # concern for aspiration/CAP - resolved  Self extubated 5/22.  Antibiotic courses listed below.   Cefepime 5/11-5/12, 5/19-5/22  Flagyl 5/11-5/12  Ceftriaxone 5/13-5/18  Vancomycin 5/19-5/20    # cardiogenic shock  2/2 intentional carvedilol and clonidine overdose - resolved  Reported taking 180 tabs carvedilol 6.25mg and clonidine 0.1 mg on 5/11 at 1900.  Required multiple vasopressors including insulin gtt and methylene blud.  Now off vasopressors and hemodynamically stable.      # hypothyroidism - continue PTA synthroid     # hx of recurrent UTI's - holding PTA ciprofloxacin     # pressure injury coccyx - hospital acquired        Diet: Calorie Counts  Snacks/Supplements Adult: Boost Breeze; With Meals  Dysphagia Diet Level 2 Mech Altered Nectar Thickened Liquids (pre-thickened or use instant food thickener)    Fluids: 1 L LR x 1 today   Lines: PIV   DVT Prophylaxis: Enoxaparin (Lovenox) SQ  Napoles Catheter: not present  Code Status: Full Code      Disposition Plan   Expected discharge: 4 - 7 days, recommended to inpatient psychiatry  once medically stable and meeting nutritional needs.  Entered: Kourtney Esposito MD 05/25/2019, 1:39 PM       The patient's care was discussed with the Attending Physician, Dr. Ramos.    Kourtney Esposito MD  [address], [place]  Pager: [phone]  Please see sticky note for cross cover information  ______________________________________________________________________    Interval History   Speech noted to be improved following ativan administration.  Occasional rigidity and tremors.      Oriented to person.  Reports that place is [place], then says [facility].  Year states 19.  Denies pain or SOB.  Nods when asked if anxiety has improved.  Nods yes when asked if she recognizes me.      Physical Exam   Vital Signs: Temp: 100.3  F (37.9  C) Temp src: Axillary BP: (!) 161/95 Pulse: 113 Heart Rate: 113 Resp: 28 SpO2: 98 % O2 Device: None (Room air)    Weight: 102 lbs 11.75 oz  General: laying in bed, no acute distress, occasional brief verbal responses  HEENT: nc/at, redness on medial side of right sclera  CV: tachycardic, regular  Pulmonary: CTAB, normal respiratory  effort on room air  Abdomen: soft, non-tender, non-distended  Neuro: alert, occasionally responding to questions, moving all extremities   Psych: fearful look gazing into the distance    Data   Recent Labs   Lab 05/25/19  1211 05/25/19  0422 05/24/19  0357 05/23/19  0340  05/19/19  0434   WBC  --  14.6* 16.6* 13.4*   < > 9.7   HGB  --  9.8* 9.6* 8.8*   < > 6.4*   MCV  --  99 97 96   < > 96   PLT  --  638* 583* 455*   < > 210   INR  --   --   --   --   --  1.26*   * 149* 141 139   < > 140   POTASSIUM 4.0 3.3* 3.4 4.0   < > 4.4   CHLORIDE  --  114* 105 104   < > 106   CO2  --  24 26 29   < > 32   BUN  --  44* 43* 29   < > 12   CR  --  0.54 0.48* 0.52   < > 0.66   ANIONGAP  --  10 10 6   < > 3   ISAURO  --  9.8 9.8 9.8   < > 7.9*   GLC  --  130* 134* 132*   < > 126*   ALBUMIN  --   --  4.2 4.2   < > 2.2*   PROTTOTAL  --   --  9.3* 9.1*   < > 5.6*   BILITOTAL  --   --  1.1 1.2   < > 1.4*   ALKPHOS  --   --  232* 264*   < > 248*   ALT  --   --  31 19   < > 18   AST  --   --  37 27   < > 35    < > = values in this interval not displayed.     No results found for this or any previous visit (from the past 24 hour(s)).

## 2019-05-25 NOTE — PLAN OF CARE
D: Suicide attempt- beta blocker overdose.  I/A: Neuro status varies. At best, oriented to self/place although not consistently. Occasionally non-verbal, some tremors and rigidity throughout night. Speech seems improved following Ativan dose but only for a short period of time. No inappropriate behaviors noted. Denies suicide ideation currently. VSS on room air. PRN Hydralazine x2 for SBP >160. Temp max 99.5. NSR to ST low 100s, no ectopy. Voiding in commode x2, no BM. Na 149, K 3.3- crosscover MD notified, will defer to day team, no further orders.   P: Transfer to  today. Closely monitor neuro status.     Problem: Mood Alteration Comorbidity  Goal: Mood Alteration Comorbidity  Description  Patient comorbidity will be monitored for signs and symptoms of Mood Alteration condition.  Problems will be absent, minimized or managed by discharge/transition of care.  Outcome: No Change

## 2019-05-25 NOTE — CONSULTS
[facility]  Neurology  Consultation    Patient Name:  Ana Laura Wharton  MRN:  [id number]    :  1970  Date of Service:  May 25, 2019  Primary care provider:  Liborio Lincoln      Neurology consultation service was asked to see Ana Laura Wharton by Dr. Esposito to evaluate for mental status change and catatonia.    History of Present Illness:   Ms Ana Laura Wharton is a 58 year old woman with a PMHx significant for Bipolar I disorder, anxiety, depression, and hypertension. She was admitted on 2019 as a transfer from [facility] for medication overdose as a suicide attempt.     Per H&P, patient had reported taking handfuls of Clonidine 0.1mg and Coreg 6.25mg on  at 0400 and then every few hours afterwards (last on 1900 on ) in an attempt to kill herself. She estimated that in total it was 180 tablets of each. She reported also taking some other medications but reported that she took them as prescribed (has citalopram, clonazepam, eletriptan, mirtazapine, oxybutynin, Atarax, Synthroid, Tylenol, and statin on her PTA med list). She called her friend on the evening of  who then brought her to the ED. The patient was reportedly awake with slurred speech and reporting abdominal pain. In the ED she had low blood pressures ranging from 59-77 systolic and 38-52 diastolic. She went into severe shock with cardiovascular collpase and was intubated due to confusion and profound hypotension. She required multiple pressors including epinephrine, vasopressin, dopamine, norepinephrine, methyl blue and agiotensin 2 blocker.      She was evaluated by the NeuroICU team due to concern for anoxic brain injury. She underwent vEEG monitoring which showed no evidence of epileptiform discharges or electrographic seizures. She also underwent MRI Brain which showed no evidence of acute infarct or hemorrhage. There was evidence of mild leptomeningeal enhancement and foci of hypointense signal  on susceptibility weighted imaging in the posterior occipital lobes and the corpus callosum likely representing previous PRES.    Patient self-extubated on 5/22. She was evaluated by Psychiatry on 5/23 during which she was calm and cooperative with questions. However, for a few times she would shut down and not respond to further questions. The following day she was noted to exhibit some disinhibited behaviors including masturbating in front of staff. She then became essentially mute and had marked posturing of her limbs in specific positions suggestive of catatonia. She was given 2mg of IV Ativan with some improvement. She is now scheduled for Ativan 2mg PO Q4H. She is currently on Zyprexa 10mg BID and Haldol 1-2mg PRN for agitation (received 2mg x3). Neurology was consulted to evaluate if this altered mental status and catatonia have an underlying neurological component.     ROS  A 10-point ROS was performed as per HPI.   PMH  Past Medical History:   Diagnosis Date     Abnormal Pap smear of cervix 01/09/2019    see problem list     Anxiety      Bipolar disorder (H)      C. difficile colitis      Cervical high risk HPV (human papillomavirus) test positive 01/09/2019    see problem list     Depressive disorder      Essential hypertension 7/8/2015     Gastro-oesophageal reflux disease      Hypothyroidism 4/1/2015     Migraine      Spontaneous pneumothorax     x3, resolved w/ pleurodesis      Suicide attempt (H)      Past Surgical History:   Procedure Laterality Date     ARTHROSCOPY KNEE      L knee     CERVIX SURGERY      for pre-cancerous changes     left knee surgery       ORTHOPEDIC SURGERY      L shoulder     THORACIC SURGERY      for pneumothorax, pleurodesis and lobe resection     Medications   Medications Prior to Admission   Medication Sig Dispense Refill Last Dose     acetaminophen (TYLENOL) 325 MG tablet Take 3 tablets (975 mg) by mouth every 8 hours as needed for mild pain or fever 30 tablet 0 Taking      albuterol (PROAIR HFA/PROVENTIL HFA/VENTOLIN HFA) 108 (90 Base) MCG/ACT inhaler Inhale 2 puffs into the lungs every 6 hours as needed for shortness of breath / dyspnea or wheezing 1 Inhaler 0 Taking     atorvastatin (LIPITOR) 10 MG tablet Take 1 tablet (10 mg) by mouth daily 90 tablet 3 Taking     carvedilol (COREG) 6.25 MG tablet Take 1 tablet (6.25 mg) by mouth 2 times daily (with meals) 180 tablet 3 Taking     ciprofloxacin (CIPRO) 250 MG tablet Take 0.5 tablets (125 mg) by mouth daily For UTI prevention 45 tablet 3 Taking     citalopram (CELEXA) 20 MG tablet Take 1 tablet (20 mg) by mouth daily 30 tablet 3      clindamycin (CLINDAGEL) 1 % topical gel Apply topically 2 times daily   Taking     clonazePAM (KLONOPIN) 1 MG tablet 1 mg bid and 2 mg qhs 120 tablet 0 Taking     cloNIDine (CATAPRES) 0.1 MG tablet Take 1 tablet (0.1 mg) by mouth 2 times daily 180 tablet 3 Taking     cyclobenzaprine (FLEXERIL) 10 MG tablet Take 1 tablet (10 mg) by mouth At Bedtime 90 tablet 0 Taking     eletriptan (RELPAX) 40 MG tablet Take 40 mg by mouth at onset of headache for migraine   Taking     FIBER PO Take 2 tablets by mouth daily   Taking     [] fluconazole (DIFLUCAN) 100 MG tablet Take 1 tablet (100 mg) by mouth daily for 15 days 15 tablet 3 Taking     folic acid (FOLVITE) 400 MCG tablet Take 400 mcg by mouth daily   Taking     hydrOXYzine (ATARAX) 50 MG tablet Take 2 tablets (100 mg) by mouth 2 times daily as needed for anxiety 120 tablet 1      IBUPROFEN PO Take 400-600 mg by mouth every 6 hours as needed for moderate pain   Taking     levothyroxine (SYNTHROID) 50 MCG tablet Take 1 tablet (50 mcg) by mouth daily 90 tablet 3 Taking     loperamide (IMODIUM A-D) 2 MG tablet Take 4 mg by mouth as needed for diarrhea   Taking     mirabegron (MYRBETRIQ) 25 MG 24 hr tablet Take 1 tablet (25 mg) by mouth daily 90 tablet 1 Taking     mirtazapine (REMERON) 30 MG tablet Take 2 tablets (60 mg) by mouth At Bedtime 180 tablet 1       "Multiple Vitamins-Minerals (WOMENS MULTI VITAMIN & MINERAL PO) Take 1 tablet by mouth daily   Taking     NEW MED    Taking     OLANZapine (ZYPREXA) 20 MG tablet Take half tab (10 mg) by mouth twice daily 90 tablet 0 Taking     Ondansetron (ZOFRAN ODT PO) Take 4 mg by mouth every 8 hours as needed for nausea   Taking     oxybutynin ER (DITROPAN-XL) 5 MG 24 hr tablet Take 1 tablet (5 mg) by mouth daily 90 tablet 3 Taking     pantoprazole (PROTONIX) 40 MG EC tablet Take 1 tablet (40 mg) by mouth every morning (before breakfast) 90 tablet 3 Taking     tolterodine (DETROL LA) 2 MG 24 hr capsule Take 1 capsule (2 mg) by mouth daily 90 capsule 3 Taking     triamcinolone (KENALOG) 0.1 % ointment    Taking     UNABLE TO FIND MEDICATION NAME: NEXAPLON IMPLANT for birth control one year 2017   Taking     Allergies  Allergies   Allergen Reactions     Amoxicillin-Pot Clavulanate      PN: LW Reaction: Itching, Pruritis     Azithromycin      Other reaction(s): Dermatitis     Cephalexin      PN: LW Reaction: Itching, Pruritis     Compazine [Prochlorperazine] Other (See Comments)     dystonia     Metoclopramide Other (See Comments)     \"I feel like I am crawling out of my skin\"     Minocycline Nausea and Vomiting     PN: DIARRHEA, VOMITING, AND STOMACH CRAMPS     Penicillins Itching     Reglan [Metoclopramide Hcl] Other (See Comments)     Sensation of \"crawling out of skin\"     Restoril [Temazepam]      Thorazine [Chlorpromazine] Other (See Comments)     dystonia     Abilify [Aripiprazole] Rash     Lamotrigine Rash     Severe drug rash - contraindication to receiving again. Skin peeling.      Sulfa Drugs Rash     Social History  Social History     Tobacco Use     Smoking status: Current Some Day Smoker     Types: Cigarettes     Smokeless tobacco: Never Used   Substance Use Topics     Alcohol use: No     Alcohol/week: 1.2 oz     Types: 2 Glasses of wine per week     Comment: Quit on 09/07/18     Family History    Family History " "  Problem Relation Age of Onset     Depression Mother      Lipids Father         hyperlipidemia     Macular Degeneration Father      Physical Examination   Vitals: BP (!) 161/95   Pulse 113   Temp 100.3  F (37.9  C) (Axillary)   Resp 28   Ht 1.657 m (5' 5.24\")   Wt 46.6 kg (102 lb 11.8 oz)   SpO2 98%   BMI 16.97 kg/m    General: Adult, in NAD, cooperative, flat affect  Chest: Normal work of breathing in room air   Extremities: No LE swelling.    Neuro:  Mental status: Awake, alert, attentive, oriented to self, age, month but states its \"2029\" and that we are at the \"[facility]". Speech is fluent but soft, no dysarthria.  Cranial nerves: Eyes conjugate, PERRL 4mm reactive bilaterally, EOMI no nystagmus, face symmetric, facial sensation intact, shoulder shrug strong, tongue/uvula midline.   Motor: Some paratonia especially in the LE. 5/5 strength in all 4 extremities. No atrophy or twitches. No catatonia appreciated.  Reflexes: Brisk and symmetric biceps, patellar, and achilles. 1-2 beats of clonus in LLE.  Sensory: Intact to LT in all four extremities.   Coordination: FNF no dysmetria, HTS intact  Gait: Not tested.     Investigations   MRI Brain WOW 5/17/19  Impression:  1. No evidence of acute infarction or intracranial hemorrhage.  2. Mild leptomeningeal enhancement, may represent early meningeal  inflammation versus evidence of IV sedation. Recommend clinical  correlation. Prior  3. 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  4. Head MRA demonstrates no definite aneurysm or stenosis of the major  intracranial arteries.  5. Neck MRA demonstrates patent major cervical arteries.    VEEG   SUMMARY OF 4 DAYS OF VIDEO-EEG MONITORING:    During continuously sedated coma, there was no evidence of wake-sleep cycling throughout the 4 days of monitoring, and background " activities consisted of continuous generalized delta-theta slowing with intermixed lower amplitude faster frequencies, initially with a relatively low amplitude of delta-theta slowing, but subsequently with moderate amplitude of delta-theta slowing.    On Day 2 of video-EEG monitoring, there were periods of nonstereotyped abnormal movements, which were not associated with electrographic seizure activity.    No interictal epileptiform abnormalities and no electrographic seizures were recorded over the course of video EEG monitoring.    These findings indicate severe generalized cerebral dysfunction, which is etiologically nonspecific.  Clinical correlation is recommended.   David Grullon M.D., Professor of Neurology    Labs:  WBC: 14.6  Hgb: 9.8  Plts: 638    Glucose: 128    Na: 147  K: 4.0  Cl: 114  HCO3: 24  Cr: 0.54  BUN: 44    Impression & Recommendations  Ms Ana Laura Wharton is a 58 year old woman with a PMHx significant for Bipolar I disorder, anxiety, depression, and hypertension. She was admitted on 5/11/2019 for medication overdose as a suicide attempt. She was intubated for confusion and profound hypotension and equired multiple pressors to maintain BP. She was evaluated by the NeuroICU team to evaluate for anoxic brain injury. She underwent MRI which showed no evidence of anoxia and four days of vEEG monitoring which was negative for seizures. She self-extubated on 5/22 and was evaluated by Psychiatry. The followed day she was noted to be mute and was exhibiting catatonia. These symptoms improved with Ativan. Today on my neurological exam, she is alert and interacting appropriately but with a flat affect. She is not fully oriented and appears confused at times. Her neurological exam is largely non-focal. She did display some echopraxia, mimicking my movements during visual field testing. Catatonia has a large differential including psychiatric, medication-induced, infectious and metabolic causes and  delirium. No concern for neuroleptic malignant syndrome/serotonin syndrome at this time given lack of fever, rigidity, autonomic instability. No concern for NCSE as she is interacting appropriately and following commands and four day EEG showed no evidence of epileptiform discharges that would make me concerned for a lower seizure threshold. We suspect that her mental status change will improve with time. Unclear what patient's underlying psychiatric baseline was. No concern for significant anoxic brain injury.     - Delirium precautions (frequent re-orientation, lights off during day, on during night, minimize medications)  - Continue treatment of catatonia per Psychiatry  - No indication for vEEG or MRI at this time    Thank you for involving neurology in the care of Ana Laura Wharton.  Please do not hesitate to call with questions/concerns (consult pager [phone]).      Patient was discussed with Dr. Ramirez via phone.    Yanet Pruitt  Neurology Resident, PGY2  Pager: [phone]

## 2019-05-25 NOTE — PLAN OF CARE
D: Remains in the hospital after suicide attempt with overdose of beta blockers.  IA: Throughout the shift she has been fearful and confused and though she's nodded and followed commands she didn't speak until this evening after the ativan was given. She had intermittent tremors and spastic movements with catatonia. After the ativan dose she was able to use the commode appropriately and speak softly but clearly. She's tachypneic and continues to have a congested cough but remains on room air with SpO2's in the mid-90's.  P: Await transfer to  but assess and monitor closely, especially for compromised respiratory status.

## 2019-05-25 NOTE — CONSULTS
Consult Date:  05/25/2019      PSYCHIATRIC CONSULTATION      IDENTIFICATION:  Ms. Wharton is a 48-year-old white female with a history of bipolar disorder who recently made a very serious suicide attempt with large doses of antihypertensive medications.  She is followed in the Intensive Care Unit.  I am asked to reassess her mental status and catatonia by Dr. Esposito.      HISTORY OF PRESENT ILLNESS:  I had an opportunity to examine this patient, along with Dr. Mena and Dr. Mosley on 05/11/2019.  We were called in because the patient developed a classic catatonic syndrome with catatonic mutism, catatonic posturing and waxy flexibility.  She had a very mild hyperreflexia, but otherwise her neuro exam seemed to me to be relatively normal.  She was treated with Ativan 2 mg.  At that point, she seemed to come out of her catatonia, was able to move more easily and said a few words.  She then received Ativan 2 mg q.4.  This morning, she was up and out of bed, going to the commode.  She was verbal and seemed to be doing quite well.  On my interview, she was sound asleep.  The dosing of benzodiazepines is somewhat difficult because she was on Klonopin 4 mg a day prior to this hospitalization.  The Ativan seemed to have worked quite well for her catatonia, but it also seemed to begin to wear off at the end of 3 or 4 hours, as per nursing.  Today, I suggested we decrease her Ativan to 1.5 q.4h. and tomorrow go to Klonopin 2 mg p.o. QID .  We obviously have to be aware that benzodiazepines can decrease respiratory function and I note the patient has been on a CO2 monitor today.  I also discussed the case with Neurology, asked them to do a followup to see if they could come up with a reason why this patient would suddenly develop catatonia and to make sure we are not missing any neurological event.      MENTAL STATUS EXAMINATION:  On my interview, the patient was sleeping soundly.  Her significant other was in the room.  I was  trying not to wake her up, so I opted against waking her.  She does continue on a 1:1 and is being monitored for any potential respiratory impairments.      ASSESSMENT:  Catatonia.  Also, bipolar affective disorder.      RECOMMENDATION:  Decrease Ativan to 1.5 mg q.4h. today and then tomorrow go to Klonopin 2 mg QID .  Please monitor for respiratory impairment.  I also recommend a neuro followup.            DEDE FLORES MD             D: 2019   T: 2019   MT: AUDI      Name:     KEE TINEO   MRN:      [id number]        Account:       [id number]   :      1970           Consult Date:  2019      Document: [id number]       cc: ZE PIPER MD

## 2019-05-25 NOTE — PLAN OF CARE
Discharge Planner SLP   Patient plan for discharge: Unknown  Current status: Recommend diet advancement to dysphagia 2 (diced) diet/nectar-thick liquids with 1:1 assistance for cueing. Pt requires reminders to slow rate of intake. Ensure pt is fully alert and upright for all PO, taking small bites/sips at slow rate. SLP to follow.  Barriers to return to prior living situation: Mental status, medical condition, modified diet  Recommendations for discharge: TCU  Rationale for recommendations: Multiple rehab needs; pt will benefit from ongoing ST targeting swallow function and potentially cognitive-communication pending progress       Entered by: Amanda Lopez 05/25/2019 9:24 AM

## 2019-05-26 ENCOUNTER — APPOINTMENT (OUTPATIENT)
Dept: SPEECH THERAPY | Facility: CLINIC | Age: 49
DRG: 987 | End: 2019-05-26
Attending: ANESTHESIOLOGY
Payer: COMMERCIAL

## 2019-05-26 LAB
ANION GAP SERPL CALCULATED.3IONS-SCNC: 7 MMOL/L (ref 3–14)
ANION GAP SERPL CALCULATED.3IONS-SCNC: 9 MMOL/L (ref 3–14)
BUN SERPL-MCNC: 24 MG/DL (ref 7–30)
BUN SERPL-MCNC: 30 MG/DL (ref 7–30)
CALCIUM SERPL-MCNC: 9 MG/DL (ref 8.5–10.1)
CALCIUM SERPL-MCNC: 9.3 MG/DL (ref 8.5–10.1)
CHLORIDE SERPL-SCNC: 105 MMOL/L (ref 94–109)
CHLORIDE SERPL-SCNC: 108 MMOL/L (ref 94–109)
CO2 SERPL-SCNC: 22 MMOL/L (ref 20–32)
CO2 SERPL-SCNC: 22 MMOL/L (ref 20–32)
CREAT SERPL-MCNC: 0.48 MG/DL (ref 0.52–1.04)
CREAT SERPL-MCNC: 0.51 MG/DL (ref 0.52–1.04)
ERYTHROCYTE [DISTWIDTH] IN BLOOD BY AUTOMATED COUNT: 17.2 % (ref 10–15)
GFR SERPL CREATININE-BSD FRML MDRD: >90 ML/MIN/{1.73_M2}
GFR SERPL CREATININE-BSD FRML MDRD: >90 ML/MIN/{1.73_M2}
GLUCOSE SERPL-MCNC: 126 MG/DL (ref 70–99)
GLUCOSE SERPL-MCNC: 97 MG/DL (ref 70–99)
HCT VFR BLD AUTO: 33.1 % (ref 35–47)
HGB BLD-MCNC: 10 G/DL (ref 11.7–15.7)
MAGNESIUM SERPL-MCNC: 1.9 MG/DL (ref 1.6–2.3)
MAGNESIUM SERPL-MCNC: 2.2 MG/DL (ref 1.6–2.3)
MCH RBC QN AUTO: 31 PG (ref 26.5–33)
MCHC RBC AUTO-ENTMCNC: 30.2 G/DL (ref 31.5–36.5)
MCV RBC AUTO: 103 FL (ref 78–100)
PLATELET # BLD AUTO: 620 10E9/L (ref 150–450)
POTASSIUM SERPL-SCNC: 3.2 MMOL/L (ref 3.4–5.3)
POTASSIUM SERPL-SCNC: 4.1 MMOL/L (ref 3.4–5.3)
POTASSIUM SERPL-SCNC: 4.5 MMOL/L (ref 3.4–5.3)
RBC # BLD AUTO: 3.23 10E12/L (ref 3.8–5.2)
SODIUM SERPL-SCNC: 134 MMOL/L (ref 133–144)
SODIUM SERPL-SCNC: 136 MMOL/L (ref 133–144)
SODIUM SERPL-SCNC: 139 MMOL/L (ref 133–144)
WBC # BLD AUTO: 16.8 10E9/L (ref 4–11)

## 2019-05-26 PROCEDURE — 25800030 ZZH RX IP 258 OP 636: Performed by: SURGERY

## 2019-05-26 PROCEDURE — 83735 ASSAY OF MAGNESIUM: CPT | Performed by: HOSPITALIST

## 2019-05-26 PROCEDURE — 25000132 ZZH RX MED GY IP 250 OP 250 PS 637: Performed by: STUDENT IN AN ORGANIZED HEALTH CARE EDUCATION/TRAINING PROGRAM

## 2019-05-26 PROCEDURE — 80048 BASIC METABOLIC PNL TOTAL CA: CPT | Performed by: PEDIATRICS

## 2019-05-26 PROCEDURE — 36415 COLL VENOUS BLD VENIPUNCTURE: CPT | Performed by: HOSPITALIST

## 2019-05-26 PROCEDURE — 94640 AIRWAY INHALATION TREATMENT: CPT

## 2019-05-26 PROCEDURE — 80048 BASIC METABOLIC PNL TOTAL CA: CPT | Performed by: HOSPITALIST

## 2019-05-26 PROCEDURE — 85027 COMPLETE CBC AUTOMATED: CPT | Performed by: HOSPITALIST

## 2019-05-26 PROCEDURE — 25000132 ZZH RX MED GY IP 250 OP 250 PS 637: Performed by: PEDIATRICS

## 2019-05-26 PROCEDURE — 12000001 ZZH R&B MED SURG/OB UMMC

## 2019-05-26 PROCEDURE — 25000125 ZZHC RX 250: Performed by: STUDENT IN AN ORGANIZED HEALTH CARE EDUCATION/TRAINING PROGRAM

## 2019-05-26 PROCEDURE — 92526 ORAL FUNCTION THERAPY: CPT | Mod: GN | Performed by: SPEECH-LANGUAGE PATHOLOGIST

## 2019-05-26 PROCEDURE — 25000132 ZZH RX MED GY IP 250 OP 250 PS 637: Performed by: SURGERY

## 2019-05-26 PROCEDURE — 83735 ASSAY OF MAGNESIUM: CPT | Performed by: PEDIATRICS

## 2019-05-26 PROCEDURE — 25000125 ZZHC RX 250: Performed by: SURGERY

## 2019-05-26 PROCEDURE — 36415 COLL VENOUS BLD VENIPUNCTURE: CPT | Performed by: PEDIATRICS

## 2019-05-26 PROCEDURE — 40000275 ZZH STATISTIC RCP TIME EA 10 MIN

## 2019-05-26 PROCEDURE — 25000128 H RX IP 250 OP 636: Performed by: HOSPITALIST

## 2019-05-26 PROCEDURE — 84132 ASSAY OF SERUM POTASSIUM: CPT | Performed by: PEDIATRICS

## 2019-05-26 PROCEDURE — 99233 SBSQ HOSP IP/OBS HIGH 50: CPT | Performed by: PSYCHIATRY & NEUROLOGY

## 2019-05-26 PROCEDURE — 84295 ASSAY OF SERUM SODIUM: CPT | Performed by: PEDIATRICS

## 2019-05-26 PROCEDURE — 99233 SBSQ HOSP IP/OBS HIGH 50: CPT | Performed by: PEDIATRICS

## 2019-05-26 PROCEDURE — 25000132 ZZH RX MED GY IP 250 OP 250 PS 637: Performed by: HOSPITALIST

## 2019-05-26 PROCEDURE — 25000132 ZZH RX MED GY IP 250 OP 250 PS 637: Performed by: ANESTHESIOLOGY

## 2019-05-26 RX ORDER — AMOXICILLIN 250 MG
1 CAPSULE ORAL 2 TIMES DAILY PRN
Status: DISCONTINUED | OUTPATIENT
Start: 2019-05-26 | End: 2019-05-29 | Stop reason: HOSPADM

## 2019-05-26 RX ORDER — POTASSIUM CL/LIDO/0.9 % NACL 10MEQ/0.1L
10 INTRAVENOUS SOLUTION, PIGGYBACK (ML) INTRAVENOUS
Status: DISCONTINUED | OUTPATIENT
Start: 2019-05-26 | End: 2019-05-29 | Stop reason: HOSPADM

## 2019-05-26 RX ORDER — POTASSIUM CHLORIDE 7.45 MG/ML
10 INJECTION INTRAVENOUS
Status: DISCONTINUED | OUTPATIENT
Start: 2019-05-26 | End: 2019-05-29 | Stop reason: HOSPADM

## 2019-05-26 RX ORDER — POTASSIUM CHLORIDE 29.8 MG/ML
20 INJECTION INTRAVENOUS
Status: DISCONTINUED | OUTPATIENT
Start: 2019-05-26 | End: 2019-05-29 | Stop reason: HOSPADM

## 2019-05-26 RX ORDER — POTASSIUM CHLORIDE 750 MG/1
20-40 TABLET, EXTENDED RELEASE ORAL
Status: DISCONTINUED | OUTPATIENT
Start: 2019-05-26 | End: 2019-05-29 | Stop reason: HOSPADM

## 2019-05-26 RX ORDER — MAGNESIUM SULFATE HEPTAHYDRATE 40 MG/ML
4 INJECTION, SOLUTION INTRAVENOUS EVERY 4 HOURS PRN
Status: DISCONTINUED | OUTPATIENT
Start: 2019-05-26 | End: 2019-05-29 | Stop reason: HOSPADM

## 2019-05-26 RX ORDER — AMLODIPINE BESYLATE 10 MG/1
10 TABLET ORAL DAILY
Status: DISCONTINUED | OUTPATIENT
Start: 2019-05-27 | End: 2019-05-29 | Stop reason: HOSPADM

## 2019-05-26 RX ORDER — AMLODIPINE BESYLATE 5 MG/1
5 TABLET ORAL ONCE
Status: COMPLETED | OUTPATIENT
Start: 2019-05-26 | End: 2019-05-26

## 2019-05-26 RX ORDER — POTASSIUM CHLORIDE 1.5 G/1.58G
20-40 POWDER, FOR SOLUTION ORAL
Status: DISCONTINUED | OUTPATIENT
Start: 2019-05-26 | End: 2019-05-29 | Stop reason: HOSPADM

## 2019-05-26 RX ORDER — CLONAZEPAM 1 MG/1
2 TABLET ORAL 3 TIMES DAILY
Status: DISCONTINUED | OUTPATIENT
Start: 2019-05-26 | End: 2019-05-28

## 2019-05-26 RX ORDER — LOPERAMIDE HCL 2 MG
2 CAPSULE ORAL
Status: COMPLETED | OUTPATIENT
Start: 2019-05-26 | End: 2019-05-26

## 2019-05-26 RX ORDER — AMOXICILLIN 250 MG
2 CAPSULE ORAL 2 TIMES DAILY PRN
Status: DISCONTINUED | OUTPATIENT
Start: 2019-05-26 | End: 2019-05-29 | Stop reason: HOSPADM

## 2019-05-26 RX ADMIN — LOPERAMIDE HYDROCHLORIDE 2 MG: 2 CAPSULE ORAL at 15:43

## 2019-05-26 RX ADMIN — POTASSIUM CHLORIDE 20 MEQ: 1.5 POWDER, FOR SOLUTION ORAL at 08:25

## 2019-05-26 RX ADMIN — FOLIC ACID 1 MG: 1 TABLET ORAL at 08:25

## 2019-05-26 RX ADMIN — FLUTICASONE PROPIONATE 1 SPRAY: 50 SPRAY, METERED NASAL at 08:26

## 2019-05-26 RX ADMIN — OLANZAPINE 10 MG: 10 TABLET, FILM COATED ORAL at 21:54

## 2019-05-26 RX ADMIN — NICOTINE 1 PATCH: 7 PATCH TRANSDERMAL at 08:26

## 2019-05-26 RX ADMIN — ENOXAPARIN SODIUM 50 MG: 60 INJECTION SUBCUTANEOUS at 08:26

## 2019-05-26 RX ADMIN — CLONAZEPAM 2 MG: 1 TABLET ORAL at 19:08

## 2019-05-26 RX ADMIN — Medication 100 MG: at 07:58

## 2019-05-26 RX ADMIN — PANTOPRAZOLE SODIUM 40 MG: 40 TABLET, DELAYED RELEASE ORAL at 07:00

## 2019-05-26 RX ADMIN — DEXTROSE MONOHYDRATE: 50 INJECTION, SOLUTION INTRAVENOUS at 02:07

## 2019-05-26 RX ADMIN — LORAZEPAM 0.5 MG: 0.5 TABLET ORAL at 05:21

## 2019-05-26 RX ADMIN — Medication 2 G: at 05:25

## 2019-05-26 RX ADMIN — LEVOTHYROXINE SODIUM 50 MCG: 25 TABLET ORAL at 07:58

## 2019-05-26 RX ADMIN — AMLODIPINE BESYLATE 5 MG: 5 TABLET ORAL at 08:25

## 2019-05-26 RX ADMIN — IPRATROPIUM BROMIDE AND ALBUTEROL SULFATE 3 ML: .5; 3 SOLUTION RESPIRATORY (INHALATION) at 20:00

## 2019-05-26 RX ADMIN — ENOXAPARIN SODIUM 50 MG: 60 INJECTION SUBCUTANEOUS at 21:57

## 2019-05-26 RX ADMIN — LORAZEPAM 0.5 MG: 0.5 TABLET ORAL at 02:11

## 2019-05-26 RX ADMIN — CARVEDILOL 12.5 MG: 12.5 TABLET, FILM COATED ORAL at 19:08

## 2019-05-26 RX ADMIN — CARVEDILOL 12.5 MG: 12.5 TABLET, FILM COATED ORAL at 07:58

## 2019-05-26 RX ADMIN — POTASSIUM CHLORIDE 40 MEQ: 1.5 POWDER, FOR SOLUTION ORAL at 05:22

## 2019-05-26 RX ADMIN — LORAZEPAM 0.5 MG: 0.5 TABLET ORAL at 12:16

## 2019-05-26 RX ADMIN — LORAZEPAM 0.5 MG: 0.5 TABLET ORAL at 15:43

## 2019-05-26 RX ADMIN — IPRATROPIUM BROMIDE AND ALBUTEROL SULFATE 3 ML: .5; 3 SOLUTION RESPIRATORY (INHALATION) at 08:00

## 2019-05-26 RX ADMIN — AMLODIPINE BESYLATE 5 MG: 5 TABLET ORAL at 07:58

## 2019-05-26 ASSESSMENT — ACTIVITIES OF DAILY LIVING (ADL)
ADLS_ACUITY_SCORE: 12.5

## 2019-05-26 ASSESSMENT — MIFFLIN-ST. JEOR: SCORE: 1102.62

## 2019-05-26 NOTE — CONSULTS
"Consult Date:  05/26/2019      PSYCHIATRIC CONSULTATION      IDENTIFICATION:  Ms. Ana Laura Wharton is a 48-year-old white female who was hospitalized in our Intensive Care Unit after a very severe drug overdose in a suicide attempt on 05/24.  She developed an acute catatonic syndrome with catatonic mutism, posturing and waxy flexibility.  This responded very well to Ativan and she has been receiving high-dose IV Ativan since that time.  I note that she has had some issues with oversedation and continues to wax and wane as far as mental status.  The plan has been to switch  her back to Klonopin and then taper her back to her home dose, which is 4 mg.  From there, it could be tapered appropriately by her outpatient psychiatrist.  At this point, I would discontinue the Ativan, treat with Klonopin 2 mg t.i.d. and rapidly taper down to 2 mg b.i.d.      MENTAL STATUS EXAMINATION:  Today, the patient was pleasant and cooperative.  She reports her mood is good, but her affect was sad.  Her speech was coherent and goal oriented, but there was poverty of speech.  She was alert.  She thought she was in the hospital in [place].  She did know the month and the year.  She knew the President and President Obama.  She was able to spell the word \"world\" forwards and backwards rapidly and accurately.  She realized she was in the hospital secondary to a drug overdose in a suicide attempt (nursing staff reports her mental status continues to wax and wane).  Content of thought was without overt psychosis or suicidal ideation.  Recent memory was somewhat impaired.  Remote memory was better preserved.  Fund of knowledge was somewhat impaired.  Use of language was within normal limits.  Concentration was generally at baseline.  Insight and judgment remained somewhat guarded.  Muscle strength and tone appeared generally decreased.        VITAL SIGNS:  Recent vital signs include temperature of 97.9, heart rate of 82, respiration rate of 25 " with 96% oxygen saturation and blood pressure of 151/95.      ASSESSMENT:  Catatonia is resolving, but delirium is ongoing.  Given her history, we must consider the possibility of ongoing anoxic encephalopathy.      RECOMMENDATION:  Switch from Ativan to Klonopin at 2 mg 3 times a day, hold for oversedation, and taper back to her home dose of 2 mg b.i.d.  over the next 4 days.  If her catatonia returns, we will again treat with IV Ativan.         DEDE FLORES MD             D: 2019   T: 2019   MT: PK      Name:     KEE TINEO   MRN:      [id number]        Account:       [id number]   :      1970           Consult Date:  2019      Document: [id number]       cc: Liborio Lincoln MD

## 2019-05-26 NOTE — PLAN OF CARE
Discharge Planner SLP   Patient plan for discharge: Did not discuss with patient.   Current status: SLP: Pt seen for dysphagia therapy. Pt reports swallowing is going ok. Pt given po trials thin liquid with consistent delayed coughing demonstrated after each sip. Pt tolerate sips of nectar thickened liquid without clinical s/sx of aspiration/penetration. Pt declined trials of solids as her lunch was on it's way. Attempted to return x2 to observe meal tray however it was not present on any attempts. Recommend continue Dysphagia diet level 2 with nectar thickened liquids. Sit pt upright for all po intake. Encourage small bites/sips and slow rate.  Barriers to return to prior living situation: Dysphagia  Recommendations for discharge: TCU  Rationale for recommendations: Pt will likely continue to benefit from SLP services for dysphagia after discharge.        Entered by: Eli Armstrong 05/26/2019 2:49 PM

## 2019-05-26 NOTE — PROGRESS NOTES
Calorie Count    Intake recorded for: 5/25/2019  Total Kcals: 73 Total Protein: 3g    Kcals from Hospital Food: 0   Protein: 0g    Kcals from Outside Food (average):0 Protein: 0g    # Meals Recorded: 1 meal (less than 25% meatloaf w/ gravy, mashed potatoes w/ gravy, green beans, nectar thick orange juice, carrots)    # Supplements Recorded: no intake recorded

## 2019-05-26 NOTE — PROGRESS NOTES
[facility], [facility] Progress Note - Hospitalist Service, Lorenzo 5       Date of Admission:  5/11/2019  Assessment & Plan    Ana Laura Wharton is a 47 yo F with a complex past medical history including bipolar disorder, anxiety, recurrent UTIs, and HTN who was admitted for intentional overdose of a beta blocker and clonidine.  She required ICU cares for pressors and respiratory support with initial concern for anoxic brain injury now hemodynamically stable and transferred to medicine with slowly improving cognitive status.    # Altered Mental Status due to Probable Catatonia and Possible Delirium  # History of Bipolar Disorder and Anxiety  Previous concern for anoxic brain injury in the setting of suicide attempt but re-assuring MRI.  Mental status continues to wax and wane with noted components of catatonia per psychiatry.  No new concerns per neurology. 5/26 appears slightly sedated, not fearful as before.  - Psychiatry Consulted  - Continue ativan 0.5 mg q4h and await psych recommendations  Zyprexa 10 mg at bedtime  - Haldol 1-2 mg PRN  - 1:1 sitter    # Hypernatremia  Most consistent with hypovolemia in the setting of decreased PO intake.  Normalized on 5/26, will monitor off of IVF.  - Discontinue D5W  - Repeat sodium at Noon  - Consider further IVF as needed    # R internal jugular thrombus   Suspected to be line associated from attempted placement at outside hospital prior to transfer.    - lovenox 40 mg BID      # hx of EtOH abuse   - thiamine 100 mg daily   - folate 1 mg daily      # HTN  - carvedilol 12.5 mg BID (PTA dosing of 6.25 mg daily)   - amlodipine increased to 10 mg daily (started 5/25)   - hydralazine 5 - 10 mg PRN      # severe malnutrition  In the context of critical illness.  PO intake now limited by mental status but improving.  - calorie counts   - Holding NG/NJ placement at present as PO intake is improving and strong concern for agitation with possible  placement of feeding tube     # acute normocytic anemia   Received 3 unit(s) pRBC's.       # acute hypoxic/hypercapnic respiratory failure requiring intubation - resolved   # concern for aspiration/CAP - resolved  Self extubated 5/22.  Antibiotic courses listed below.   Cefepime 5/11-5/12, 5/19-5/22  Flagyl 5/11-5/12  Ceftriaxone 5/13-5/18  Vancomycin 5/19-5/20     # cardiogenic shock 2/2 intentional carvedilol and clonidine overdose - resolved  Reported taking 180 tabs carvedilol 6.25mg and clonidine 0.1 mg on 5/11 at 1900.  Required multiple vasopressors including insulin gtt and methylene blud.  Now off vasopressors and hemodynamically stable.       # hypothyroidism - continue PTA synthroid      # hx of recurrent UTI's - holding PTA ciprofloxacin      # pressure injury coccyx - hospital acquired     Diet: Calorie Counts  Snacks/Supplements Adult: Boost Breeze; With Meals  Dysphagia Diet Level 2 Mech Altered Nectar Thickened Liquids (pre-thickened or use instant food thickener)    DVT Prophylaxis: Enoxaparin (Lovenox) SQ  Napoles Catheter: not present  Code Status: Full Code      Disposition Plan   Expected discharge: 4 - 7 days, recommended to inpatient psychiatry once blood pressures stabilized, mental status stabilized, and PO intake adequate..  Entered: Yomaira Ramos MD 05/26/2019, 7:05 AM     The patient's care was discussed with the Bedside Nurse and Patient.    Yomaira Ramos MD  Hospitalist Service, [address]  Pager: [phone]  Please see sticky note for cross cover information  ______________________________________________________________________    Interval History   Ana Laura did well overnight overall with periods of increased activity (ambulating) and speaking but also had periods of somnolence.  This morning she is non-verbal but nodding to questions, denying any distress.    The remainder of a 4 point ROS is negative unless otherwise noted  above.    Data reviewed today: I reviewed all medications, new labs and imaging results over the last 24 hours.    Physical Exam   Vital Signs: Temp: 97.9  F (36.6  C) Temp src: Axillary BP: (!) 159/111 Pulse: 93 Heart Rate: 97 Resp: 24 SpO2: 99 % O2 Device: None (Room air)    Weight: 103 lbs 2.8 oz   Gen: Asleep but awakens to touch and voice,in no acute distress  HEENT: NC/AT, R medial subconjunctival hemorrhage, sclera anicteric, oropharynx clear  Neck: Supple  Resp: LCTAB, no increased WOB on RA  CV: RRR, no m/r/g  Abd: Soft, non-tender, non-distended  Extrem: WWP, no LE edema  Neuro: Asleep, requires tactile stimulation to awaken, nodding to questions, non-verbal, moving all 4 extrem

## 2019-05-26 NOTE — PROGRESS NOTES
Neurology brief progress note    S: Patient was drowsy at the beginning of the shift.  Ativan was decreased and patient became progressively more restless.  She was oriented to person only overnight.  Pulled her IV.  Her friend is in the room who reports some improvement over yesterday.    O:  Temp: 98.1  F (36.7  C) Temp src: Oral BP: 131/82 Pulse: 80 Heart Rate: 85 Resp: 22 SpO2: 98 % O2 Device: None (Room air)      General: 48-year-old female in no apparent distress  Cardiac: Regular rate and rhythm  Pulmonary: Clear to auscultation bilaterally  Neuro: She is alert and oriented to person and day of week.  She has difficulty with the year.  She follows simple commands appropriately.  Face symmetric.  EOMI.  Tongue midline.  Toes downgoing.    A/P:  58-year-old woman with history of bipolar 1 disorder, depression, anxiety, and hypertension admitted 5/11 for medication overdose and suicide attempt.  She was intubated for confusion and hypotension requiring multiple pressors.  Previously evaluated by neuro ICU team for anoxic brain injury.  MRI showed no evidence of anoxia and 4 days of video EEG monitoring was negative for seizures.  She has since exhibited some catatonia.  Symptoms improved with Ativan.  Her exam continues to be reassuring to neurology and we do not feel that she requires any further work-up.  We have low concern for a new ischemic event since her last MRI, and we have a low concern for status epilepticus.  We suspect that she will continue to improve.  Neurology will sign off at this time.  Please contact us with any further questions or concerns.  -Continue delirium precautions  -Continue treatment of catatonia per psychiatry  - No further work-up from a neurologic standpoint  -Neurology will sign off at this time    Sandeep Sparks MD on 5/26/2019 at 4:43 PM  Neurology PGY3  [id number]

## 2019-05-26 NOTE — PROGRESS NOTES
Pt sleeping but awakened easily. Capnography check at 17:00--EtCO2=24, Pulm Index=7  17:30--EtCO2=17, Pulm Index=3  Notified Medicine Cross cover MD. Pt is awake, alert, remains disoriented to time and place (no significant change). Appropriate speech, movements, and gestures.   Will continue to monitor and VBG in 1 hour.

## 2019-05-26 NOTE — PLAN OF CARE
D: Suicide attempt- beta blocker overdose.  I/A: Very labile neuro status. Drowsy and hard to arouse at beginning of shift, MD notified. Ativan decreased to 0.5 mg. Restless/irritable as night progressed. Oriented to person only. Needing frequent reorientation. Pulled out PIV, replaced by vascular access team. Otherwise stable on room air, NSR to ST low 100s, afebrile. ETCO2 29 when drowsy but otherwise WNL. Some elevated BPs when agitated. Voiding in commode, 1 smear BM. Tolerating nectar thick liquids well, adequate PO fluid intake throughout shift, no appetite. Skin unchanged. K 3.2, Lorenzo crosscover notified. PRN replacement protocol ordered. 60mEq K given PO, 2gm mag given IV.   P: Transfer to  when bed available. Closely monitor neuro status.    Problem: Mood Alteration Comorbidity  Goal: Mood Alteration Comorbidity  Description  Patient comorbidity will be monitored for signs and symptoms of Mood Alteration condition.  Problems will be absent, minimized or managed by discharge/transition of care.  Outcome: No Change

## 2019-05-27 ENCOUNTER — APPOINTMENT (OUTPATIENT)
Dept: SPEECH THERAPY | Facility: CLINIC | Age: 49
DRG: 987 | End: 2019-05-27
Attending: ANESTHESIOLOGY
Payer: COMMERCIAL

## 2019-05-27 ENCOUNTER — APPOINTMENT (OUTPATIENT)
Dept: GENERAL RADIOLOGY | Facility: CLINIC | Age: 49
DRG: 987 | End: 2019-05-27
Attending: ANESTHESIOLOGY
Payer: COMMERCIAL

## 2019-05-27 LAB
ALBUMIN SERPL-MCNC: 3.8 G/DL (ref 3.4–5)
ALBUMIN UR-MCNC: NEGATIVE MG/DL
ALP SERPL-CCNC: 188 U/L (ref 40–150)
ALT SERPL W P-5'-P-CCNC: 111 U/L (ref 0–50)
ANION GAP SERPL CALCULATED.3IONS-SCNC: 7 MMOL/L (ref 3–14)
APPEARANCE UR: CLEAR
AST SERPL W P-5'-P-CCNC: 66 U/L (ref 0–45)
BACTERIA #/AREA URNS HPF: ABNORMAL /HPF
BILIRUB SERPL-MCNC: 0.9 MG/DL (ref 0.2–1.3)
BILIRUB UR QL STRIP: NEGATIVE
BUN SERPL-MCNC: 22 MG/DL (ref 7–30)
CALCIUM SERPL-MCNC: 9.2 MG/DL (ref 8.5–10.1)
CHLORIDE SERPL-SCNC: 105 MMOL/L (ref 94–109)
CO2 SERPL-SCNC: 22 MMOL/L (ref 20–32)
COLOR UR AUTO: ABNORMAL
CREAT SERPL-MCNC: 0.54 MG/DL (ref 0.52–1.04)
CRP SERPL-MCNC: <2.9 MG/L (ref 0–8)
ERYTHROCYTE [DISTWIDTH] IN BLOOD BY AUTOMATED COUNT: 16.4 % (ref 10–15)
GFR SERPL CREATININE-BSD FRML MDRD: >90 ML/MIN/{1.73_M2}
GLUCOSE BLDC GLUCOMTR-MCNC: 101 MG/DL (ref 70–99)
GLUCOSE BLDC GLUCOMTR-MCNC: 101 MG/DL (ref 70–99)
GLUCOSE SERPL-MCNC: 94 MG/DL (ref 70–99)
GLUCOSE UR STRIP-MCNC: NEGATIVE MG/DL
HCT VFR BLD AUTO: 34.6 % (ref 35–47)
HGB BLD-MCNC: 11 G/DL (ref 11.7–15.7)
HGB UR QL STRIP: ABNORMAL
INR PPP: 1.24 (ref 0.86–1.14)
KETONES UR STRIP-MCNC: NEGATIVE MG/DL
LEUKOCYTE ESTERASE UR QL STRIP: NEGATIVE
MCH RBC QN AUTO: 30.9 PG (ref 26.5–33)
MCHC RBC AUTO-ENTMCNC: 31.8 G/DL (ref 31.5–36.5)
MCV RBC AUTO: 97 FL (ref 78–100)
MUCOUS THREADS #/AREA URNS LPF: PRESENT /LPF
NITRATE UR QL: NEGATIVE
PH UR STRIP: 5.5 PH (ref 5–7)
PLATELET # BLD AUTO: 703 10E9/L (ref 150–450)
POTASSIUM SERPL-SCNC: 4.4 MMOL/L (ref 3.4–5.3)
PROCALCITONIN SERPL-MCNC: 0.08 NG/ML
PROT SERPL-MCNC: 8 G/DL (ref 6.8–8.8)
RBC # BLD AUTO: 3.56 10E12/L (ref 3.8–5.2)
RBC #/AREA URNS AUTO: 1 /HPF (ref 0–2)
SODIUM SERPL-SCNC: 135 MMOL/L (ref 133–144)
SOURCE: ABNORMAL
SP GR UR STRIP: 1.01 (ref 1–1.03)
SQUAMOUS #/AREA URNS AUTO: 1 /HPF (ref 0–1)
UROBILINOGEN UR STRIP-MCNC: NORMAL MG/DL (ref 0–2)
WBC # BLD AUTO: 18.3 10E9/L (ref 4–11)
WBC #/AREA URNS AUTO: 0 /HPF (ref 0–5)

## 2019-05-27 PROCEDURE — 92526 ORAL FUNCTION THERAPY: CPT | Mod: GN

## 2019-05-27 PROCEDURE — 71045 X-RAY EXAM CHEST 1 VIEW: CPT

## 2019-05-27 PROCEDURE — 80053 COMPREHEN METABOLIC PANEL: CPT | Performed by: PEDIATRICS

## 2019-05-27 PROCEDURE — 85610 PROTHROMBIN TIME: CPT | Performed by: PEDIATRICS

## 2019-05-27 PROCEDURE — 12000001 ZZH R&B MED SURG/OB UMMC

## 2019-05-27 PROCEDURE — 25000128 H RX IP 250 OP 636: Performed by: HOSPITALIST

## 2019-05-27 PROCEDURE — 84145 PROCALCITONIN (PCT): CPT | Performed by: PEDIATRICS

## 2019-05-27 PROCEDURE — 36415 COLL VENOUS BLD VENIPUNCTURE: CPT | Performed by: PEDIATRICS

## 2019-05-27 PROCEDURE — 99233 SBSQ HOSP IP/OBS HIGH 50: CPT | Mod: GC | Performed by: PEDIATRICS

## 2019-05-27 PROCEDURE — 25000132 ZZH RX MED GY IP 250 OP 250 PS 637: Performed by: HOSPITALIST

## 2019-05-27 PROCEDURE — 25000128 H RX IP 250 OP 636: Performed by: STUDENT IN AN ORGANIZED HEALTH CARE EDUCATION/TRAINING PROGRAM

## 2019-05-27 PROCEDURE — 25000132 ZZH RX MED GY IP 250 OP 250 PS 637: Performed by: ANESTHESIOLOGY

## 2019-05-27 PROCEDURE — 25000132 ZZH RX MED GY IP 250 OP 250 PS 637: Performed by: STUDENT IN AN ORGANIZED HEALTH CARE EDUCATION/TRAINING PROGRAM

## 2019-05-27 PROCEDURE — 86140 C-REACTIVE PROTEIN: CPT | Performed by: PEDIATRICS

## 2019-05-27 PROCEDURE — 81001 URINALYSIS AUTO W/SCOPE: CPT | Performed by: HOSPITALIST

## 2019-05-27 PROCEDURE — 85027 COMPLETE CBC AUTOMATED: CPT | Performed by: PEDIATRICS

## 2019-05-27 PROCEDURE — 25000132 ZZH RX MED GY IP 250 OP 250 PS 637: Performed by: PEDIATRICS

## 2019-05-27 PROCEDURE — 00000146 ZZHCL STATISTIC GLUCOSE BY METER IP

## 2019-05-27 RX ORDER — IPRATROPIUM BROMIDE AND ALBUTEROL SULFATE 2.5; .5 MG/3ML; MG/3ML
3 SOLUTION RESPIRATORY (INHALATION) EVERY 4 HOURS PRN
Status: DISCONTINUED | OUTPATIENT
Start: 2019-05-27 | End: 2019-05-29 | Stop reason: HOSPADM

## 2019-05-27 RX ADMIN — CLONAZEPAM 2 MG: 1 TABLET ORAL at 10:42

## 2019-05-27 RX ADMIN — CLONAZEPAM 2 MG: 1 TABLET ORAL at 15:54

## 2019-05-27 RX ADMIN — LEVOTHYROXINE SODIUM 50 MCG: 25 TABLET ORAL at 10:45

## 2019-05-27 RX ADMIN — CLONAZEPAM 2 MG: 1 TABLET ORAL at 19:20

## 2019-05-27 RX ADMIN — OLANZAPINE 10 MG: 10 TABLET, FILM COATED ORAL at 21:32

## 2019-05-27 RX ADMIN — PANTOPRAZOLE SODIUM 40 MG: 40 TABLET, DELAYED RELEASE ORAL at 10:43

## 2019-05-27 RX ADMIN — ENOXAPARIN SODIUM 50 MG: 60 INJECTION SUBCUTANEOUS at 19:23

## 2019-05-27 RX ADMIN — AMLODIPINE BESYLATE 10 MG: 10 TABLET ORAL at 10:43

## 2019-05-27 RX ADMIN — CARVEDILOL 12.5 MG: 12.5 TABLET, FILM COATED ORAL at 17:25

## 2019-05-27 RX ADMIN — ONDANSETRON 4 MG: 2 INJECTION INTRAMUSCULAR; INTRAVENOUS at 05:21

## 2019-05-27 RX ADMIN — ENOXAPARIN SODIUM 50 MG: 60 INJECTION SUBCUTANEOUS at 10:47

## 2019-05-27 RX ADMIN — CARVEDILOL 12.5 MG: 12.5 TABLET, FILM COATED ORAL at 10:43

## 2019-05-27 RX ADMIN — Medication 100 MG: at 10:45

## 2019-05-27 RX ADMIN — FOLIC ACID 1 MG: 1 TABLET ORAL at 10:45

## 2019-05-27 ASSESSMENT — ACTIVITIES OF DAILY LIVING (ADL)
ADLS_ACUITY_SCORE: 12.5
ADLS_ACUITY_SCORE: 12.5
ADLS_ACUITY_SCORE: 18
ADLS_ACUITY_SCORE: 12
ADLS_ACUITY_SCORE: 18
ADLS_ACUITY_SCORE: 12.5

## 2019-05-27 ASSESSMENT — MIFFLIN-ST. JEOR: SCORE: 1110.9

## 2019-05-27 ASSESSMENT — PAIN DESCRIPTION - DESCRIPTORS: DESCRIPTORS: ACHING

## 2019-05-27 NOTE — PLAN OF CARE
On RA. Capno spotcheck CO2 29, IPI 7-9. Alert, disoriented to time (year). Following commands. Tearful at HS after phone call with mother; emotional support provided and pt became calm afterward. Afebrile. NSR to ST (HR 80's to 110's). BP's elevated (150's/100's); PRN hydralazine parameters not met. DD2 diet with nectar thick liquids; calorie counts done today. Voiding wnl's. No stools overnight. Plan to transfer to floor when bed available.

## 2019-05-27 NOTE — PLAN OF CARE
Transferred to: 5B @ 1600  Status at time of transfer: Stable  Belongings: Sent with patient  Chart and medications: Sent with patient  Family notified: At bedside

## 2019-05-27 NOTE — PLAN OF CARE
Discharge Planner SLP   Patient plan for discharge: Did not discuss  Current status: Medical team requesting re assessment, feeling pt's diet restrictions may be impacting her mental health. Pt tolerated thin liquids by cup sips and regular solids. Weak, delayed cough with thin liquids x 1 with large, consecutive sips. Pt appeared to tolerated single cup sips with cues for small sips without indications or aspiration, changes in breath sounds or vocal quality. Adequate oral manipulation of regular solids.    Recommend regular solids and thin liquids - NO straws. Pt to be sitting upright, small bites/sips, slow rate of intake, nursing staff to provide cues for SINGLE cup sips of liquids.     Barriers to return to prior living situation: Medical status, nutrition, dysphagia   Recommendations for discharge: inpatient pysch per provider's documentation  Rationale for recommendations: SLP for management of dysphagia and monitoring of voice        Entered by: Carolann Good 05/27/2019 12:01 PM

## 2019-05-27 NOTE — PROGRESS NOTES
[facility], [place]    Progress Note - Lorenzo 5 Service        Date of Admission:  5/11/2019    Assessment & Plan   Ana Laura Wharton is a 48 year old female with a past medical history of bipolar disorder, depression, anxiety admitted on 5/11/2019 with suicide attempt with beta blocker and clonidine overdose.      # leukocytosis   Rising leukocytosis despite discontinuation of steroids concerning for possible infection.  CRP within normal limits.    - UA/UC  - CXR  - blood culture x 1  - procal pending  - low threshold to initiate abx if febrile or hemodynamic instability     # catatonia   # hx bipolar/anxiety/depression  # delirium   Previous concern for anoxic brain injury in the setting of suicide attempt, but EEG and MRI reassuring.  Mental status wax and wane following extubation with noted components of catatonia per psychiatry. Improved with trial of benzodiazapines.  Neurology reevaluated and recommended no further workup.    - Psychiatry consulted  - klonopin 2 mg TID (taper to 2 mg BID over the next 4 days)   - zyprexa 10 mg QHS  - haldol 1 - 2 mg PRN agitation   - 1:1 sitter    # hypernatremia - resolved   Secondary to hypovolemia in the setting of decreased PO intake from encephalopathy.  Improved with free water supplementation.    - Na BID     # R internal jugular thrombus   Suspected to be line associated from attempted placement at outside hospital prior to transfer.    - lovenox 40 mg BID     # hx of EtOH abuse   - thiamine 100 mg daily   - folate 1 mg daily     # HTN  - carvedilol 12.5 mg BID (PTA dosing of 6.25 mg daily)   - amlodipine 5 mg daily (started 5/25)   - hydralazine 5 - 10 mg PRN     # severe malnutrition  In the context of critical illness.  PO intake now limited by mental status.   - calorie counts   - may need to consider [place] placement if not meeting nutritional needs     # acute normocytic anemia   Received 3u pRBC's.      # acute hypoxic/hypercapnic  respiratory failure requiring intubation - resolved   # concern for aspiration/CAP - resolved  Antibiotic course competed.  Self extubated 5/22.     # cardiogenic shock 2/2 intentional carvedilol and clonidine overdose - resolved  Reported taking 180 tabs carvedilol 6.25mg and clonidine 0.1 mg on 5/11 at 1900.  Required multiple vasopressors including insulin gtt and methylene blud.  Now off vasopressors and hemodynamically stable.      # hypothyroidism - continue PTA synthroid     # hx of recurrent UTI's - holding PTA ciprofloxacin     # pressure injury coccyx - hospital acquired      Diet: Snacks/Supplements Adult: Boost Breeze; With Meals  Dysphagia Diet Level 2 [facility] Altered Nectar Thickened Liquids (pre-thickened or use instant food thickener)  Calorie Counts    Fluids: none  Lines: PIV   DVT Prophylaxis: Enoxaparin (Lovenox) SQ  Napoles Catheter: not present  Code Status: Full Code      Disposition Plan   Expected discharge: 4 - 7 days, recommended to inpatient psychiatry  once medically stable and meeting nutritional needs.  Entered: Kourtney Esposito MD 05/27/2019, 7:25 AM     The patient's care was discussed with the Attending Physician, Dr. Ramos.    Kourtney Esposito MD  [address], [place]  Pager: [phone]  Please see sticky note for cross cover information  ______________________________________________________________________    Interval History   Oriented to person, place and year this morning.  Notes mild gastric upset yesterday that is now resolved.  No dysuria.  No SOB, but has mild cough.  No CP.  Denies diarrhea.      Physical Exam   Vital Signs: Temp: 98.1  F (36.7  C) Temp src: Oral BP: 148/79 Pulse: 85 Heart Rate: 87 Resp: 26 SpO2: 91 % O2 Device: None (Room air)    Weight: 103 lbs 2.8 oz  General: laying in bed, no acute distress  HEENT: nc/at, redness on medial side of right sclera  CV: RRR  Pulmonary: CTAB, normal respiratory effort on room air  Abdomen:  soft, non-tender, non-distended  Neuro: alert and oriented, responding to questions appropriately     Data   Recent Labs   Lab 05/27/19  0651 05/26/19  2204 05/26/19  1255 05/26/19  0348  05/25/19  0422 05/24/19  0357   WBC 18.3*  --   --  16.8*  --  14.6* 16.6*   HGB 11.0*  --   --  10.0*  --  9.8* 9.6*   MCV 97  --   --  103*  --  99 97   *  --   --  620*  --  638* 583*   INR 1.24*  --   --   --   --   --   --     134 136 139   < > 149* 141   POTASSIUM 4.4 4.5 4.1 3.2*   < > 3.3* 3.4   CHLORIDE 105 105  --  108  --  114* 105   CO2 PENDING 22  --  22  --  24 26   BUN PENDING 24  --  30  --  44* 43*   CR PENDING 0.51*  --  0.48*  --  0.54 0.48*   ANIONGAP PENDING 7  --  9  --  10 10   ISAURO PENDING 9.3  --  9.0  --  9.8 9.8   GLC PENDING 97  --  126*  --  130* 134*   ALBUMIN PENDING  --   --   --   --   --  4.2   PROTTOTAL PENDING  --   --   --   --   --  9.3*   BILITOTAL PENDING  --   --   --   --   --  1.1   ALKPHOS PENDING  --   --   --   --   --  232*   ALT PENDING  --   --   --   --   --  31   AST PENDING  --   --   --   --   --  37    < > = values in this interval not displayed.     No results found for this or any previous visit (from the past 24 hour(s)).

## 2019-05-27 NOTE — PROGRESS NOTES
Calorie Count    Intake recorded for: 5/26/2019  Total Kcals: 513 Total Protein: 10g    Kcals from Hospital Food: 513   Protein: 10g    Kcals from Outside Food (average):0 Protein: 0g    # Meals Recorded: 2 meals (first - 100% banana, peaches)  (second - 100% apple juice, peaches, 2 cans diet [place] [employer]a, apple sauce, tomato soup, 50% chicken noodle soup)    # Supplements Recorded: no intake recorded

## 2019-05-28 ENCOUNTER — APPOINTMENT (OUTPATIENT)
Dept: SPEECH THERAPY | Facility: CLINIC | Age: 49
DRG: 987 | End: 2019-05-28
Attending: ANESTHESIOLOGY
Payer: COMMERCIAL

## 2019-05-28 ENCOUNTER — APPOINTMENT (OUTPATIENT)
Dept: OCCUPATIONAL THERAPY | Facility: CLINIC | Age: 49
DRG: 987 | End: 2019-05-28
Attending: ANESTHESIOLOGY
Payer: COMMERCIAL

## 2019-05-28 ENCOUNTER — APPOINTMENT (OUTPATIENT)
Dept: PHYSICAL THERAPY | Facility: CLINIC | Age: 49
DRG: 987 | End: 2019-05-28
Attending: ANESTHESIOLOGY
Payer: COMMERCIAL

## 2019-05-28 LAB
ALBUMIN SERPL-MCNC: 4.2 G/DL (ref 3.4–5)
ALP SERPL-CCNC: 178 U/L (ref 40–150)
ALT SERPL W P-5'-P-CCNC: 72 U/L (ref 0–50)
ANION GAP SERPL CALCULATED.3IONS-SCNC: 11 MMOL/L (ref 3–14)
AST SERPL W P-5'-P-CCNC: 30 U/L (ref 0–45)
BASOPHILS # BLD AUTO: 0.1 10E9/L (ref 0–0.2)
BASOPHILS NFR BLD AUTO: 0.8 %
BILIRUB SERPL-MCNC: 0.6 MG/DL (ref 0.2–1.3)
BUN SERPL-MCNC: 27 MG/DL (ref 7–30)
CALCIUM SERPL-MCNC: 9.4 MG/DL (ref 8.5–10.1)
CHLORIDE SERPL-SCNC: 104 MMOL/L (ref 94–109)
CO2 SERPL-SCNC: 20 MMOL/L (ref 20–32)
CREAT SERPL-MCNC: 0.57 MG/DL (ref 0.52–1.04)
CRP SERPL-MCNC: <2.9 MG/L (ref 0–8)
DIFFERENTIAL METHOD BLD: ABNORMAL
EOSINOPHIL # BLD AUTO: 0.8 10E9/L (ref 0–0.7)
EOSINOPHIL NFR BLD AUTO: 4.7 %
ERYTHROCYTE [DISTWIDTH] IN BLOOD BY AUTOMATED COUNT: 16.8 % (ref 10–15)
GFR SERPL CREATININE-BSD FRML MDRD: >90 ML/MIN/{1.73_M2}
GLUCOSE SERPL-MCNC: 101 MG/DL (ref 70–99)
HCT VFR BLD AUTO: 38.7 % (ref 35–47)
HGB BLD-MCNC: 12 G/DL (ref 11.7–15.7)
IMM GRANULOCYTES # BLD: 0.7 10E9/L (ref 0–0.4)
IMM GRANULOCYTES NFR BLD: 4 %
LYMPHOCYTES # BLD AUTO: 3.5 10E9/L (ref 0.8–5.3)
LYMPHOCYTES NFR BLD AUTO: 21 %
MAGNESIUM SERPL-MCNC: 2.2 MG/DL (ref 1.6–2.3)
MCH RBC QN AUTO: 31.1 PG (ref 26.5–33)
MCHC RBC AUTO-ENTMCNC: 31 G/DL (ref 31.5–36.5)
MCV RBC AUTO: 100 FL (ref 78–100)
MONOCYTES # BLD AUTO: 1.6 10E9/L (ref 0–1.3)
MONOCYTES NFR BLD AUTO: 9.8 %
NEUTROPHILS # BLD AUTO: 10 10E9/L (ref 1.6–8.3)
NEUTROPHILS NFR BLD AUTO: 59.7 %
PHOSPHATE SERPL-MCNC: 4.1 MG/DL (ref 2.5–4.5)
PLATELET # BLD AUTO: 751 10E9/L (ref 150–450)
POTASSIUM SERPL-SCNC: 4 MMOL/L (ref 3.4–5.3)
PROT SERPL-MCNC: 8.4 G/DL (ref 6.8–8.8)
RBC # BLD AUTO: 3.86 10E12/L (ref 3.8–5.2)
SODIUM SERPL-SCNC: 135 MMOL/L (ref 133–144)
WBC # BLD AUTO: 17.6 10E9/L (ref 4–11)

## 2019-05-28 PROCEDURE — 80053 COMPREHEN METABOLIC PANEL: CPT | Performed by: HOSPITALIST

## 2019-05-28 PROCEDURE — 84100 ASSAY OF PHOSPHORUS: CPT | Performed by: HOSPITALIST

## 2019-05-28 PROCEDURE — 25000132 ZZH RX MED GY IP 250 OP 250 PS 637: Performed by: PEDIATRICS

## 2019-05-28 PROCEDURE — 85025 COMPLETE CBC W/AUTO DIFF WBC: CPT | Performed by: HOSPITALIST

## 2019-05-28 PROCEDURE — 92526 ORAL FUNCTION THERAPY: CPT | Mod: GN

## 2019-05-28 PROCEDURE — 99233 SBSQ HOSP IP/OBS HIGH 50: CPT | Mod: GC | Performed by: INTERNAL MEDICINE

## 2019-05-28 PROCEDURE — 36415 COLL VENOUS BLD VENIPUNCTURE: CPT | Performed by: HOSPITALIST

## 2019-05-28 PROCEDURE — 87040 BLOOD CULTURE FOR BACTERIA: CPT | Performed by: HOSPITALIST

## 2019-05-28 PROCEDURE — 25000132 ZZH RX MED GY IP 250 OP 250 PS 637: Performed by: SURGERY

## 2019-05-28 PROCEDURE — 99232 SBSQ HOSP IP/OBS MODERATE 35: CPT | Performed by: PSYCHIATRY & NEUROLOGY

## 2019-05-28 PROCEDURE — 85004 AUTOMATED DIFF WBC COUNT: CPT | Performed by: HOSPITALIST

## 2019-05-28 PROCEDURE — 97165 OT EVAL LOW COMPLEX 30 MIN: CPT | Mod: GO

## 2019-05-28 PROCEDURE — 97535 SELF CARE MNGMENT TRAINING: CPT | Mod: GO

## 2019-05-28 PROCEDURE — 25000132 ZZH RX MED GY IP 250 OP 250 PS 637: Performed by: STUDENT IN AN ORGANIZED HEALTH CARE EDUCATION/TRAINING PROGRAM

## 2019-05-28 PROCEDURE — 25000132 ZZH RX MED GY IP 250 OP 250 PS 637: Performed by: HOSPITALIST

## 2019-05-28 PROCEDURE — 97116 GAIT TRAINING THERAPY: CPT | Mod: GP | Performed by: REHABILITATION PRACTITIONER

## 2019-05-28 PROCEDURE — 12000001 ZZH R&B MED SURG/OB UMMC

## 2019-05-28 PROCEDURE — 86140 C-REACTIVE PROTEIN: CPT | Performed by: HOSPITALIST

## 2019-05-28 PROCEDURE — 25000128 H RX IP 250 OP 636: Performed by: HOSPITALIST

## 2019-05-28 PROCEDURE — 25000132 ZZH RX MED GY IP 250 OP 250 PS 637: Performed by: ANESTHESIOLOGY

## 2019-05-28 PROCEDURE — 85027 COMPLETE CBC AUTOMATED: CPT | Performed by: HOSPITALIST

## 2019-05-28 PROCEDURE — 83735 ASSAY OF MAGNESIUM: CPT | Performed by: HOSPITALIST

## 2019-05-28 RX ORDER — CARVEDILOL 6.25 MG/1
12.5 TABLET ORAL 2 TIMES DAILY WITH MEALS
Status: DISCONTINUED | OUTPATIENT
Start: 2019-05-28 | End: 2019-05-29 | Stop reason: HOSPADM

## 2019-05-28 RX ORDER — CLONAZEPAM 0.5 MG/1
1 TABLET ORAL 2 TIMES DAILY WITH MEALS
Status: DISCONTINUED | OUTPATIENT
Start: 2019-05-28 | End: 2019-05-29 | Stop reason: HOSPADM

## 2019-05-28 RX ORDER — CLONAZEPAM 0.5 MG/1
2 TABLET ORAL AT BEDTIME
Status: DISCONTINUED | OUTPATIENT
Start: 2019-05-28 | End: 2019-05-29 | Stop reason: HOSPADM

## 2019-05-28 RX ADMIN — POTASSIUM CHLORIDE 20 MEQ: 750 TABLET, EXTENDED RELEASE ORAL at 15:40

## 2019-05-28 RX ADMIN — LEVOTHYROXINE SODIUM 50 MCG: 25 TABLET ORAL at 09:59

## 2019-05-28 RX ADMIN — PANTOPRAZOLE SODIUM 40 MG: 40 TABLET, DELAYED RELEASE ORAL at 09:59

## 2019-05-28 RX ADMIN — CARVEDILOL 12.5 MG: 6.25 TABLET, FILM COATED ORAL at 18:16

## 2019-05-28 RX ADMIN — FOLIC ACID 1 MG: 1 TABLET ORAL at 09:59

## 2019-05-28 RX ADMIN — CLONAZEPAM 2 MG: 1 TABLET ORAL at 10:06

## 2019-05-28 RX ADMIN — OLANZAPINE 10 MG: 10 TABLET, FILM COATED ORAL at 21:18

## 2019-05-28 RX ADMIN — CLONAZEPAM 1 MG: 1 TABLET ORAL at 18:16

## 2019-05-28 RX ADMIN — AMLODIPINE BESYLATE 10 MG: 10 TABLET ORAL at 09:59

## 2019-05-28 RX ADMIN — CARVEDILOL 12.5 MG: 6.25 TABLET, FILM COATED ORAL at 10:06

## 2019-05-28 RX ADMIN — CLONAZEPAM 2 MG: 0.5 TABLET ORAL at 21:19

## 2019-05-28 RX ADMIN — Medication 100 MG: at 09:59

## 2019-05-28 RX ADMIN — ENOXAPARIN SODIUM 50 MG: 60 INJECTION SUBCUTANEOUS at 09:56

## 2019-05-28 RX ADMIN — ENOXAPARIN SODIUM 50 MG: 60 INJECTION SUBCUTANEOUS at 19:54

## 2019-05-28 ASSESSMENT — ACTIVITIES OF DAILY LIVING (ADL)
ADLS_ACUITY_SCORE: 12
ADLS_ACUITY_SCORE: 11
ADLS_ACUITY_SCORE: 12
ADLS_ACUITY_SCORE: 11
PREVIOUS_RESPONSIBILITIES: MEAL PREP;HOUSEKEEPING;LAUNDRY;DRIVING;WORK

## 2019-05-28 NOTE — PLAN OF CARE
Pt Alert, disoriented to time (year). Pt's orientation waxes and wanes. Pt asked nurse where her boots and coat was because she came in the winter time. Family visited. Pt DD2 diet with nectar thick liquids with calorie counts. Coughs of phlegm. Chest XRAY done. Will continue to monitor and follow POC.

## 2019-05-28 NOTE — PROGRESS NOTES
"   05/28/19 1523   Quick Adds   Type of Visit Initial Occupational Therapy Evaluation   Living Environment   Lives With alone   Home Accessibility no concerns   Transportation Anticipated car, drives self;family or friend will provide   Living Environment Comment Patient reported she lives alone, but jasen SanchezNessa) will be able to assist her at time of discharge. Has a 16 year old son who lives with his father.   Self-Care   Usual Activity Tolerance good   Current Activity Tolerance moderate   Regular Exercise No   Equipment Currently Used at Home none   Functional Level   Ambulation 0-->independent   Transferring 0-->independent   Toileting 0-->independent   Bathing 0-->independent   Dressing 0-->independent   Eating 0-->independent   Communication 0-->understands/communicates without difficulty   Swallowing 0-->swallows foods/liquids without difficulty   Cognition 0 - no cognition issues reported   Fall history within last six months yes   Number of times patient has fallen within last six months 1   Which of the above functional risks had a recent onset or change? ambulation;transferring;fall history   Prior Functional Level Comment Patient is currently on disability, but was an  at one time. Has her Master's Degree from [employer].       Present no   General Information   Onset of Illness/Injury or Date of Surgery - Date 05/21/19   Referring Physician Duane Hill MD   Patient/Family Goals Statement To go home.   Additional Occupational Profile Info/Pertinent History of Current Problem \"Ana Laura Wharton is a 48 year old female with a past medical history of bipolar disorder, depression, anxiety admitted on 5/11/2019 with suicide attempt with beta blocker and clonidine overdose.\"   Precautions/Limitations   (suicide precautions)   Cognitive Status Examination   Orientation orientation to person, place and time   Cognitive Comment See daily documentation " for MoCA scores   Visual Perception   Visual Perception Wears glasses   Sensory Examination   Sensory Quick Adds No deficits were identified   Range of Motion (ROM)   ROM Quick Adds No deficits were identified   Strength   Manual Muscle Testing Quick Adds No deficits were identified   Hand Strength   Hand Strength Comments WFL   Coordination   Coordination Comments WFL   Transfer Skill: Sit to Stand   Level of Bent: Sit/Stand independent   Instrumental Activities of Daily Living (IADL)   Previous Responsibilities meal prep;housekeeping;laundry;driving;work   Activities of Daily Living Analysis   Impairments Contributing to Impaired Activities of Daily Living strength decreased   General Therapy Interventions   Planned Therapy Interventions ADL retraining   Clinical Impression   Criteria for Skilled Therapeutic Interventions Met yes, treatment indicated   OT Diagnosis Patient presented to OT with decreased independence in ADL tasks.   Influenced by the following impairments Strength   Assessment of Occupational Performance 1-3 Performance Deficits   Identified Performance Deficits Home management   Clinical Decision Making (Complexity) Low complexity   Therapy Frequency daily   Predicted Duration of Therapy Intervention (days/wks) 1 week   Anticipated Discharge Disposition Home with Assist   Risks and Benefits of Treatment have been explained. Yes   Patient, Family & other staff in agreement with plan of care Yes   Total Evaluation Time   Total Evaluation Time (Minutes) 5

## 2019-05-28 NOTE — CONSULTS
Psychiatry Consultation; Follow up              Reason for Consult, requesting source:    Discharge planning  Requesting source: Luis Alfredo Duran               Interim history:    She is seen today so as to discuss discharge options; returning home versus inpatient psychiatry.  She reports that her suicidal thoughts have totally resolved and she feels much better mood wise; indeed she does look much brighter and her catatonia symptoms have totally cleared.  We will able to meet with her significant other Nessa regarding the discharge plan.  He felt comfortable with her going home as long as she continued attendance at her mental health support groups and resumed AA attendance; she had done well with this when attending over 6 months ago.  They felt that this relatively brief  Relapse may have been prevented if she was going to meetings.    MOCA just completed was 29/30            Medications:     Current Facility-Administered Medications   Medication     acetaminophen (TYLENOL) tablet 975 mg     amLODIPine (NORVASC) tablet 10 mg     bisacodyl (DULCOLAX) Suppository 10 mg     carvedilol (COREG) tablet 12.5 mg     clonazePAM (klonoPIN) tablet 1 mg     clonazePAM (klonoPIN) tablet 2 mg     glucose gel 15-30 g    Or     dextrose 50 % injection 25-50 mL    Or     glucagon injection 1 mg     enoxaparin (LOVENOX) injection 50 mg     fluticasone (FLONASE) 50 MCG/ACT spray 1 spray     folic acid (FOLVITE) tablet 1 mg     haloperidol lactate (HALDOL) injection 1-2 mg     hydrALAZINE (APRESOLINE) injection 5-10 mg     ipratropium - albuterol 0.5 mg/2.5 mg/3 mL (DUONEB) neb solution 3 mL     levothyroxine (SYNTHROID/LEVOTHROID) tablet 50 mcg     magnesium sulfate 2 g in NS intermittent infusion ([facility]ar[facility] or [facility] C[facility]d)     magnesium sulfate 4 g in 100 mL sterile water (premade)     naloxone (NARCAN) injection 0.1-0.4 mg     nicotine (NICODERM CQ) 7 MG/24HR 24 hr patch 1 patch     nicotine Patch in Place     nicotine patch REMOVAL  "    OLANZapine (zyPREXA) tablet 10 mg     ondansetron (ZOFRAN) injection 4 mg     pantoprazole (PROTONIX) EC tablet 40 mg     polyethylene glycol (MIRALAX/GLYCOLAX) Packet 17 g     potassium chloride (KLOR-CON) Packet 20-40 mEq     potassium chloride 10 mEq in 100 mL intermittent infusion with 10 mg lidocaine     potassium chloride 10 mEq in 100 mL sterile water intermittent infusion (premix)     potassium chloride 20 mEq in 50 mL intermittent infusion     potassium chloride ER (K-DUR/KLOR-CON M) CR tablet 20-40 mEq     senna-docusate (SENOKOT-S/PERICOLACE) 8.6-50 MG per tablet 1 tablet    Or     senna-docusate (SENOKOT-S/PERICOLACE) 8.6-50 MG per tablet 2 tablet     vitamin B1 (THIAMINE) tablet 100 mg            MSE:   Standing by bedside, walks with minimum difficulty. She is well groomed, pleasant, cooperative. Speech fluent.  Associations tight.  Mood is \"good\"  Affect congruent.  Thought process logical, linear.  Thought content negative for suicidal thoughts or delusions.  Oriented x3.  Recent and remote memory, attention span and concentration are intact.  Fund of knowledge, use of language appropriate.  Insight and judgment good.     Vital signs:  Temp: 97.3  F (36.3  C) Temp src: Oral BP: 137/87 Pulse: 84 Heart Rate: 90 Resp: 16 SpO2: 99 % O2 Device: None (Room air) Oxygen Delivery: 2 LPM Height: 165.7 cm (5' 5.24\") Weight: 47.6 kg (105 lb)  Estimated body mass index is 17.35 kg/m  as calculated from the following:    Height as of this encounter: 1.657 m (5' 5.24\").    Weight as of this encounter: 47.6 kg (105 lb).            DSM-5 Diagnosis:   Bipolar disorder, catatonia resolved, alcohol use disorder           Assessment:   Her catatonia has obviously cleared and her mood is very stable.  She says that her mood is actually quite good and her thoughts of harming herself have totally resolved.  She is having a bit of morning grogginess from the 10 mg of Zyprexa that she was taking before so she would like " "dose adjusted.    She would like to continue with the Celexa that Dr. Rodriguez had started recently.  It appears that she has a reasonable discharge plan as an alternative to inpatient psychiatry.  She is also committed to positive lifestyle changes and getting help with her sobriety.          Summary of Recommendations:   Suicide precautions and sitter may be discontinued once she has room closer to nursing station.   Zyprexa at 15 mg at bedtime rather than 10 mg twice a day. May use 5 mg PRN during the day for anxiety or linda symptoms   Continue with Celexa 20 mg/day and Remeron 60 mg at bedtime  Will resume her outpatient dose of Klonopin 1 mg twice a day 2 mg at bedtime.  We discussed possibly tapering off this in the future  To continue attending [facility] meetings weekly and re-start with AA   She has an appointment with Dr Rodriguez at [facility] June 6 at 4 pm       \"This dictation was performed with voice recognition software and may contain errors,  omissions and inadvertent word substitution.\"          "

## 2019-05-28 NOTE — PLAN OF CARE
Discharge Planner SLP   Patient plan for discharge: Did not discuss  Current status: Pt tolerated regular solids and thin liquids. Possible wet vocal quality x 1 with large, consecutive cup sips of liquids. No overt s/sx of aspiration with single/small sips.     Continue regular solids and thin liquids, NO straws. Pt to be sitting upright, small bites/sips, slow rate of intake, nursing staff to provide cues for SINGLE cup sips of liquids.     Barriers to return to prior living situation: None per SLP  Recommendations for discharge: Defer to medical team  Rationale for recommendations: Anticipate pt will need SLP follow up at next level of care for management of dysphagia and voice          Entered by: Carolann Good 05/28/2019 2:44 PM

## 2019-05-28 NOTE — PLAN OF CARE
D/I: Pt had an uneventful night, alert, but disoriented to time at times. Quiet, calm and cooperative with cares. Pt DD2 diet with nectar thick liquids with calorie counts. Weak coughs of phlegm. Showered this morning. Attendant at bedside.   P: Will continue to monitor and follow POC. Continue suicide watch with sitter at bedside and charting every 15 minutes

## 2019-05-28 NOTE — PLAN OF CARE
Discharge Planner PT   Patient plan for discharge: not stated   Current status: pt is SBA to IND for all bed mobility. SBA for all sit to stand no A.D. Pt demo amb up to 300' needing CGA due to mid instability. Pt demo gait and balance drills with CGA to slight min A due to instability.   Barriers to return to prior living situation: weakness, fatigue.   Recommendations for discharge: PT - per plan established by the Physical Therapist, according to functional mobility the  discharge recommendation is cont rehab needs.   Rationale for recommendations: cont PT to progress balance and stability in standing.        Entered by: Greg Padilla 05/28/2019 11:23 AM

## 2019-05-28 NOTE — PROGRESS NOTES
CLINICAL NUTRITION SERVICES - REASSESSMENT NOTE     Nutrition Prescription    RECOMMENDATIONS FOR MDs/PROVIDERS TO ORDER:  Continue with diet as tolerated     Malnutrition Status:    Remains at Severe malnutrition in the context of chronic illness    Recommendations already ordered by Registered Dietitian (RD):  Updated oral supplements in between meals:   Nectar thick boost plus between melas  Magic cup with meals     Future/Additional Recommendations:  None       EVALUATION OF THE PROGRESS TOWARD GOALS   Diet:   Dysphagia 2, mechanical altered, Nectar thickened liquids  Calorie count    Nutrition Support:   Was on TF support with Nutren 1.5 via OGT 5/16-5/23    Intake:   Has been improving slowly. Has been consuming 750-100% over the past few days.    Calorie count:  5/27: 1321 kcal and 78 gm protein from 3 meals  5/26: 513 kcal and 10 gm protein from 2 meals  5/24+5/25: insufficient data  Patient met estimated needs yesterday. Ongoing encouragement to PO        NEW FINDINGS   Chart reviewed: Past medical history of bipolar disorder, depression, anxiety admitted on 5/11/2019 with suicide attempt with beta blocker and clonidine overdose.      --Antibiotic course competed.  Self extubated 5/22.     MALNUTRITION  % Intake: Decreased intake does not meet criteria  % Weight Loss: > 2% in 1 week (severe)  Subcutaneous Fat Loss: None observed  Muscle Loss: Previously Scapular bone: moderate, Thoracic region (clavicle, acromium bone, deltoid, trapezius, pectoral): moderate, Upper arm (bicep, tricep): severe, Lower arm  (forearm): moderate, Dorsal hand: mild, Upper leg (quadricep, hamstring): severe, Patellar region: moderate and Posterior calf:  severe  Fluid Accumulation/Edema: Mild  Malnutrition Diagnosis: Remains at Severe malnutrition in the context of chronic illness      Previous Goals   Total avg nutritional intake to meet a minimum of 25 kcal/kg and 1.5 g PRO/kg daily (per dosing wt 49 kg).  Evaluation: met with  Po x 1 day yesterday       Previous Nutrition Diagnosis  Predicted inadequate nutrient intake (calories, protein) related to prolonged hospital LOS and risk for interruptions to TF infusion.    Evaluation: Improving    CURRENT NUTRITION DIAGNOSIS  Predicted inadequate nutrient intake related to limitation by mental status and prolonged hospital stay     INTERVENTIONS  Implementation  Medical food supplement therapy: changed to boost plus in between meals and magic cup with meals     Goals  Patient to consume % of nutritionally adequate meal trays TID, or the equivalent with supplements/snacks.    Monitoring/Evaluation  Progress toward goals will be monitored and evaluated per protocol.    Dejuan Meza RD/SYLVIA  Pager [phone]

## 2019-05-28 NOTE — PROGRESS NOTES
Calorie Count  Intake recorded for: 5/27  Total Kcals: 1321 Total Protein: 78g  Kcals from Hospital Food: 1,321  Protein: 78g  Kcals from Outside Food (average):0 Protein: 0g  # Meals Recorded: 3 meals recorded (First - 100% banana, cottage cheese, apple sauce)       (Second - 100% mushroom and swiss burger on multi grain bun w/ ketchup, mustard, and pack)       (Third - 100% baked chips, cottage cheese, 50% ham sandwich on wheat w/ tomato, lettuce, pack)  # Supplements Recorded: 0

## 2019-05-28 NOTE — PLAN OF CARE
Discharge Planner OT   Patient plan for discharge: Prefers to discharge home and return to [facility] support group every other week to address substance and mental health issues.  Current status: Independently performing basic activities of daily living tasks in room today. The MoCA (8.1) was completed with patient to assess cognition with patient scoring a 29/30 which is within normal range (26 and greater considered to be normal cognition). Delayed recall was the only area where points were deducted from score.  Barriers to return to prior living situation: Mental health.  Recommendations for discharge: From an OT standpoint, a TCU placement is not warranted. Patient is independent in daily living activities, functional mobility transfers and cognition is within normal limits. Will defer to Psychiatry to determine wether IP Psych stay is required.   Rationale for recommendations: As stated above.       Entered by: Echo Siegel 05/28/2019 3:11 PM

## 2019-05-28 NOTE — PROGRESS NOTES
[facility], [place]    Progress Note - Lorenzo 5 Service        Date of Admission:  5/11/2019    Assessment & Plan   Ana Laura Wharton is a 48 year old female with a past medical history of bipolar disorder, depression, anxiety admitted on 5/11/2019 with suicide attempt with beta blocker and clonidine overdose.      # leukocytosis   Rising leukocytosis despite discontinuation of steroids concerning for possible infection.  CRP and procal within normal limits.  CXR and UA unremarkable.  No diarrhea or other localizing symptoms.  Monitoring for now.    - trend WBC   - blood culture x 1  - low threshold to initiate abx if febrile or hemodynamic instability     # catatonia   # hx bipolar/anxiety/depression  # delirium   Previous concern for anoxic brain injury in the setting of suicide attempt, but EEG and MRI reassuring.  Mental status wax and wane following extubation with noted components of catatonia per psychiatry. Improved with trial of benzodiazapines.  Neurology reevaluated and recommended no further workup.  Mental status now back to baseline.  Sitter discontinued 5/28.  Working on establishing discharge recommendations.    - Psychiatry consulted  - resume PTA dosing of klonopin 1 mg BID and 2 mg QHS  - zyprexa 10 mg QHS  - haldol 1 - 2 mg PRN agitation   - d/c sitter     # hypernatremia - resolved   Secondary to hypovolemia in the setting of decreased PO intake from encephalopathy.  Improved with free water supplementation.      # R internal jugular thrombus   Suspected to be line associated from attempted placement at outside hospital prior to transfer.    - lovenox 40 mg BID (transition to oral agent in AM in preparation for discharge)     # hx of EtOH abuse   - thiamine 100 mg daily   - folate 1 mg daily     # HTN  - carvedilol 12.5 mg BID (PTA dosing of 6.25 mg daily)   - amlodipine 5 mg daily (started 5/25)   - hydralazine 5 - 10 mg PRN     # severe malnutrition  In the  context of critical illness.  PO intake now improved with improving mental status.      # acute normocytic anemia   Received 3u pRBC's.      # acute hypoxic/hypercapnic respiratory failure requiring intubation - resolved   # concern for aspiration/CAP - resolved  Antibiotic course competed.  Self extubated 5/22.     # cardiogenic shock 2/2 intentional carvedilol and clonidine overdose - resolved  Reported taking 180 tabs carvedilol 6.25mg and clonidine 0.1 mg on 5/11 at 1900.  Required multiple vasopressors including insulin gtt and methylene blud.  Now off vasopressors and hemodynamically stable.      # hypothyroidism - continue PTA synthroid     # hx of recurrent UTI's - holding PTA ciprofloxacin, resume at discharge     # pressure injury coccyx - hospital acquired      Diet: Calorie Counts  Regular Diet Adult  Snacks/Supplements Adult: Magic Cup; With Meals  Snacks/Supplements Adult: Boost Plus; Between Meals    Fluids: none  Lines: PIV   DVT Prophylaxis: Enoxaparin (Lovenox) SQ  [place] Catheter: not present  Code Status: Full Code      Disposition Plan   [facility] 2 - 3 [facility]  once medically stable and meeting nutritional needs.  Entered: Kourtney Esposito MD 05/28/2019, 3:10 PM     The patient's care was discussed with the Attending Physician, Dr. Duran.    Kourtney Esposito MD  [address], [place]  Pager: [phone]  Please see sticky note for cross cover information  ______________________________________________________________________    Interval History   Oriented to person, place and time this morning. Denies pain.  Oral intake improving back to baseline now that she has been cleared for regular diet.  No SOB.  Single episode of diarrhea yesterday that is now resolved.     4 - point ROS otherwise negative.     Physical Exam   Vital Signs: Temp: 97.3  F (36.3  C) Temp src: Oral BP: 137/87 Pulse: 84 Heart Rate: 90 Resp: 16 SpO2:  99 % O2 Device: None (Room air)    Weight: 105 lbs 0 oz  General: laying in bed, no acute distress  HEENT: nc/at, redness on medial side of right sclera  CV: RRR  Pulmonary: CTAB, normal respiratory effort on room air  Abdomen: soft, non-tender, non-distended  Neuro: alert and oriented, responding to questions appropriately     Data   Recent Labs   Lab 05/28/19  0507 05/27/19  0651 05/26/19  2204  05/26/19  0348   WBC 17.6* 18.3*  --   --  16.8*   HGB 12.0 11.0*  --   --  10.0*    97  --   --  103*   * 703*  --   --  620*   INR  --  1.24*  --   --   --     135 134   < > 139   POTASSIUM 4.0 4.4 4.5   < > 3.2*   CHLORIDE 104 105 105  --  108   CO2 20 22 22  --  22   BUN 27 22 24  --  30   CR 0.57 0.54 0.51*  --  0.48*   ANIONGAP 11 7 7  --  9   ISAURO 9.4 9.2 9.3  --  9.0   * 94 97  --  126*   ALBUMIN 4.2 3.8  --   --   --    PROTTOTAL 8.4 8.0  --   --   --    BILITOTAL 0.6 0.9  --   --   --    ALKPHOS 178* 188*  --   --   --    ALT 72* 111*  --   --   --    AST 30 66*  --   --   --     < > = values in this interval not displayed.     No results found for this or any previous visit (from the past 24 hour(s)).

## 2019-05-28 NOTE — PLAN OF CARE
VSS on room air. A&Ox3. Continues with attendant for safety. Fair appetite eating 100% of breakfast & part of lunch. Continues on calorie counts. Calm & cooperative with care. Denies pain. Will report to oncoming RN.

## 2019-05-29 VITALS
HEART RATE: 84 BPM | OXYGEN SATURATION: 100 % | HEIGHT: 65 IN | WEIGHT: 109.8 LBS | DIASTOLIC BLOOD PRESSURE: 76 MMHG | BODY MASS INDEX: 18.29 KG/M2 | RESPIRATION RATE: 16 BRPM | SYSTOLIC BLOOD PRESSURE: 128 MMHG | TEMPERATURE: 96.9 F

## 2019-05-29 LAB
ALBUMIN SERPL-MCNC: 3.9 G/DL (ref 3.4–5)
ALP SERPL-CCNC: 155 U/L (ref 40–150)
ALT SERPL W P-5'-P-CCNC: 48 U/L (ref 0–50)
ANION GAP SERPL CALCULATED.3IONS-SCNC: 8 MMOL/L (ref 3–14)
AST SERPL W P-5'-P-CCNC: 21 U/L (ref 0–45)
BILIRUB SERPL-MCNC: 0.4 MG/DL (ref 0.2–1.3)
BUN SERPL-MCNC: 26 MG/DL (ref 7–30)
CALCIUM SERPL-MCNC: 9.3 MG/DL (ref 8.5–10.1)
CHLORIDE SERPL-SCNC: 107 MMOL/L (ref 94–109)
CO2 SERPL-SCNC: 22 MMOL/L (ref 20–32)
CREAT SERPL-MCNC: 0.62 MG/DL (ref 0.52–1.04)
ERYTHROCYTE [DISTWIDTH] IN BLOOD BY AUTOMATED COUNT: 16.8 % (ref 10–15)
GFR SERPL CREATININE-BSD FRML MDRD: >90 ML/MIN/{1.73_M2}
GLUCOSE SERPL-MCNC: 109 MG/DL (ref 70–99)
HCT VFR BLD AUTO: 36.1 % (ref 35–47)
HGB BLD-MCNC: 11.1 G/DL (ref 11.7–15.7)
MCH RBC QN AUTO: 30.7 PG (ref 26.5–33)
MCHC RBC AUTO-ENTMCNC: 30.7 G/DL (ref 31.5–36.5)
MCV RBC AUTO: 100 FL (ref 78–100)
PLATELET # BLD AUTO: 676 10E9/L (ref 150–450)
POTASSIUM SERPL-SCNC: 3.8 MMOL/L (ref 3.4–5.3)
PROT SERPL-MCNC: 8.2 G/DL (ref 6.8–8.8)
RBC # BLD AUTO: 3.61 10E12/L (ref 3.8–5.2)
SODIUM SERPL-SCNC: 136 MMOL/L (ref 133–144)
WBC # BLD AUTO: 12.7 10E9/L (ref 4–11)

## 2019-05-29 PROCEDURE — 85520 HEPARIN ASSAY: CPT | Performed by: INTERNAL MEDICINE

## 2019-05-29 PROCEDURE — 25000128 H RX IP 250 OP 636: Performed by: HOSPITALIST

## 2019-05-29 PROCEDURE — 25000132 ZZH RX MED GY IP 250 OP 250 PS 637: Performed by: STUDENT IN AN ORGANIZED HEALTH CARE EDUCATION/TRAINING PROGRAM

## 2019-05-29 PROCEDURE — 25000132 ZZH RX MED GY IP 250 OP 250 PS 637: Performed by: ANESTHESIOLOGY

## 2019-05-29 PROCEDURE — 99238 HOSP IP/OBS DSCHRG MGMT 30/<: CPT | Mod: GC | Performed by: INTERNAL MEDICINE

## 2019-05-29 PROCEDURE — 80053 COMPREHEN METABOLIC PANEL: CPT | Performed by: HOSPITALIST

## 2019-05-29 PROCEDURE — 25000132 ZZH RX MED GY IP 250 OP 250 PS 637: Performed by: SURGERY

## 2019-05-29 PROCEDURE — 85027 COMPLETE CBC AUTOMATED: CPT | Performed by: HOSPITALIST

## 2019-05-29 PROCEDURE — 36415 COLL VENOUS BLD VENIPUNCTURE: CPT | Performed by: HOSPITALIST

## 2019-05-29 PROCEDURE — 25000132 ZZH RX MED GY IP 250 OP 250 PS 637: Performed by: HOSPITALIST

## 2019-05-29 PROCEDURE — 25000132 ZZH RX MED GY IP 250 OP 250 PS 637: Performed by: PEDIATRICS

## 2019-05-29 RX ORDER — CARVEDILOL 12.5 MG/1
12.5 TABLET ORAL 2 TIMES DAILY WITH MEALS
Qty: 60 TABLET | Refills: 1 | Status: SHIPPED | OUTPATIENT
Start: 2019-05-29 | End: 2019-06-04

## 2019-05-29 RX ORDER — OLANZAPINE 10 MG/1
10 TABLET ORAL AT BEDTIME
Qty: 30 TABLET | Refills: 1 | Status: SHIPPED | OUTPATIENT
Start: 2019-05-29 | End: 2019-06-06

## 2019-05-29 RX ORDER — AMLODIPINE BESYLATE 10 MG/1
10 TABLET ORAL DAILY
Qty: 30 TABLET | Refills: 1 | Status: SHIPPED | OUTPATIENT
Start: 2019-05-30 | End: 2019-06-04

## 2019-05-29 RX ADMIN — PANTOPRAZOLE SODIUM 40 MG: 40 TABLET, DELAYED RELEASE ORAL at 08:09

## 2019-05-29 RX ADMIN — ENOXAPARIN SODIUM 50 MG: 60 INJECTION SUBCUTANEOUS at 08:12

## 2019-05-29 RX ADMIN — AMLODIPINE BESYLATE 10 MG: 10 TABLET ORAL at 08:09

## 2019-05-29 RX ADMIN — Medication 100 MG: at 08:09

## 2019-05-29 RX ADMIN — LEVOTHYROXINE SODIUM 50 MCG: 25 TABLET ORAL at 08:09

## 2019-05-29 RX ADMIN — CLONAZEPAM 1 MG: 1 TABLET ORAL at 08:10

## 2019-05-29 RX ADMIN — CARVEDILOL 12.5 MG: 6.25 TABLET, FILM COATED ORAL at 08:09

## 2019-05-29 RX ADMIN — FOLIC ACID 1 MG: 1 TABLET ORAL at 08:09

## 2019-05-29 RX ADMIN — POTASSIUM CHLORIDE 20 MEQ: 750 TABLET, EXTENDED RELEASE ORAL at 08:09

## 2019-05-29 ASSESSMENT — MIFFLIN-ST. JEOR: SCORE: 1132.67

## 2019-05-29 ASSESSMENT — ACTIVITIES OF DAILY LIVING (ADL)
ADLS_ACUITY_SCORE: 12
ADLS_ACUITY_SCORE: 11
ADLS_ACUITY_SCORE: 11

## 2019-05-29 NOTE — PROGRESS NOTES
Calorie Count  Intake recorded for: 5/28  Total Kcals: 1620 Total Protein: 75g  Kcals from Hospital Food: 1620  Protein: 75g  Kcals from Outside Food (average):0 Protein: 0g  # Meals Recorded: 2 meals recorded (First - 100% 2 chocolate milk, fruit plate w/ greek yogurt, apple pie, sausage alex)       (Second - 25% 2 chocolate milk, banana, hot turkey sandwich w/ gravy, mashed potatoes w/ gravy)  # Supplements Recorded: 100% 1 Boost Plus, 25% 1 Boost Breeze

## 2019-05-29 NOTE — PROGRESS NOTES
Social Work Services Progress Note    Hospital Day: 19  Date of Initial Social Work Evaluation:  5/13/19  Collaborated with:  Primary team Lorenzo Cardenas, psychiatry, patient    Data:  Pt is a 48-year-old female admitted 5/11/19 with suicide attempt with beta blocker and clonidine overdose.    Intervention:  Spoke with psychiatry yesterday who states pt cleared for discharge home; pt denying suicidal ideation. Per primary team, pt stable for discharge home today. Met with pt. Pt states she feels safe going home and is looking forward to discharge. Pt states she lives alone but her significant other is a good support for her. Provided pt with information on national suicide prevention hotline and psychiatry appointment with Dr. Rodriguez on 6/6/19. Pt states she also plans to attend a [facility] support group and AA meetings and knows where/when the meetings are that she plans to attend.     Pt concerned about which medications she will be discharged on- paged primary team to discuss medications with pt.     Assessment:  Pt cleared by psychiatry and will discharge home today- pt's mood bright and appropriate today    Plan:    Anticipated Disposition:  Home with services    Barriers to d/c plan:  None identified    Follow Up:  SW to follow and assist as needed    YAHAIRA Dickey, LGSW  5th Floor   Pager [phone]  Phone [phone]

## 2019-05-29 NOTE — PLAN OF CARE
VSS. Alert and oriented.  Denies thoughts of suicide.  Tolerating regular diet.  Calm and cooperative.  Attendant discontinued. Moved to room closer to nurses station. Behavior is appropriate. Patient denies pain.  Continue with plan of care.

## 2019-05-29 NOTE — PLAN OF CARE
Speech Language Therapy Discharge Summary    Reason for therapy discharge:    Discharged to home.    Progress towards therapy goal(s). See goals on Care Plan in [facility] electronic health record for goal details.  Goals partially met.  Barriers to achieving goals:   discharge from facility.    Therapy recommendation(s):    Continued therapy is recommended.  Rationale/Recommendations:  Recommend regular diet/thin liquids via single sip only. Anticipate pt may require ongoing ST targeting swallow function and voice.

## 2019-05-29 NOTE — DISCHARGE SUMMARY
Medicine Discharge Summary  Ana Laura Wharton MRN: [id number]  1970  Primary care provider: Liborio Lincoln  ___________________________________          Date of Admission:  5/11/2019  Date of Discharge:  5/29/2019   Admitting Physician:  Millicent Cornelius MD  Discharge Physician:  Luis Alfredo Duran MD   Discharging Service:  Internal Medicine, [place] [address]     Primary Provider: Liborio Lincoln         Reason for Admission:   Ana Laura Wharton is a 48 year old female with a past medical history of bipolar disorder, depression, anxiety admitted on 5/11/2019 with suicide attempt with beta blocker and clonidine overdose.            Discharge Diagnosis:   Cardiogenic shock secondary to intentional carvedilol and clonidine overdose  Acute hypoxic/hyerpercapnic respiratory faliure  Concern for aspiration pneumonia vs CAP  Catatonia  History of bipolar, anxiety and depression   Delirium  Right internal jugular thrombus   Hypernatremia  HTN  Acute normocytic anemia  Severe malnutrition  Hypothyroidism   Pressure injury coccyx - hospital acquired  History of recurrent UTI's  History of EtOH abuse         Procedures & Significant Findings:   US right upper extremity (5/12) -   Impression:  Nonocclusive thrombus in the central right internal jugular vein.  Patent right innominate, subclavian, axillary veins.    CT head (5/12) -   Impression: No acute intracranial pathology.     MRI brain (5/17) -   Impression:  1. No evidence of acute infarction or intracranial hemorrhage.  2. Mild leptomeningeal enhancement, may represent early meningeal inflammation versus evidence of IV sedation. Recommend clinical correlation.   3. 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  4. Head MRA demonstrates no definite aneurysm or stenosis of the major  intracranial arteries.  5. Neck MRA demonstrates patent major cervical arteries.    CT chest (5/18) -   Impression:   1. Layering bilateral pleural effusions with diffuse groundglass and consolidative opacities throughout the aerated lung parenchyma. Findings are compatible provided history of pneumonia. Pulmonary edema, ARDS, and alveolar hemorrhage are also the differential diagnosis.  2. Stable support devices.         Consultations:   Neurology   Pulmonary   Psychiatry          Hospital Course by Problem:    Cardiogenic shock secondary to intentional carvedilol and clonidine overdose  Reported taking 180 tabs carvedilol 6.25mg and clonidine 0.1 mg on 5/11 at 1900.  Required multiple vasopressors including insulin gtt and methylene blue. Vasopressors weaned off and patient remained hemodynamically stable for the remainder of the admission      Acute hypoxic/hyerpercapnic respiratory faliure  Concern for aspiration pneumonia vs CAP  Intubated 5/11 - 5/22.  Completed course of antibiotics for CAP.  Also, underwent bronchoscopy on 5/21 for worsening bilateral pleural effusion due to the concern for DAH.  Bronchoscopy was negative.  No further respiratory issues following extubation.     Catatonia  History of bipolar, anxiety and depression   Delirium  Previous concern for anoxic brain injury in the setting of suicide attempt, but EEG and MRI reassuring.  Mental status wax and wane following extubation with noted components of catatonia per psychiatry. Improved with trial of benzodiazapines.  Neurology reevaluated and recommended no further workup.  Mental status now back to baseline.  Psychiatry recommended outpatient follow up.  Declined suicidal thoughts at discharge.    - continued on PTA medications at discharge (klonapin 1 mg BID + 2 mg QHS, celexa 20 mg daily and remeron 60 mg QHS)   - started on zyprexa 10 mg QHS  - follow up w/ Dr. Rodriguez 6/6  - continue DIEGO meetings weekly and restart AA     Right  "internal jugular thrombus  Suspected to be line associated from attempted placement at outside hospital prior to transfer.    - apixiban 5 mg BID x 3 months      Hypernatremia  Secondary to decreased PO intake with catatonia.  Resolved with improved mental status.     HTN  - carvedilol 12.5 mg BID  - amlodipine 10 mg daily   - d/c PTA clonidine     Acute normocytic anemia  Received 3u pRBC's.      Severe malnutrition  In the context of critical illness.  PO intake now improved with improving mental status.    Hypothyroidism   - continue PTA synthroid     Pressure injury coccyx - hospital acquired  Healing at discharge.      History of recurrent UTI's  - continued PTA ciprofloxacin    Physical Exam on day of Discharge:  Blood pressure 128/76, pulse 84, temperature 96.9  F (36.1  C), temperature source Oral, resp. rate 16, height 1.657 m (5' 5.24\"), weight 49.8 kg (109 lb 12.8 oz), SpO2 100 %.  General: sitting up in bed, no acute distress  HEENT: nc/at, mmm, no scleral icterus  Respiratory: CTAB, normal respiratory effort on room air  Heart/CV: RRR, no murmur   Neuro: alert and oriented, moving all extremities  Psych: normal mood and affect         Pending Results:   None          Discharge Medications:     Discharge Medication List as of 5/29/2019 11:45 AM      START taking these medications    Details   amLODIPine (NORVASC) 10 MG tablet Take 1 tablet (10 mg) by mouth daily, Disp-30 tablet, R-1, E-Prescribe      apixaban ANTICOAGULANT (ELIQUIS) 5 MG tablet Take 1 tablet (5 mg) by mouth 2 times daily, Disp-60 tablet, R-1, E-Prescribe         CONTINUE these medications which have CHANGED    Details   carvedilol (COREG) 12.5 MG tablet Take 1 tablet (12.5 mg) by mouth 2 times daily (with meals), Disp-60 tablet, R-1, E-Prescribe      OLANZapine (ZYPREXA) 10 MG tablet 1 tablet (10 mg) by Oral or Feeding Tube route At Bedtime, Disp-30 tablet, R-1, E-Prescribe         CONTINUE these medications which have NOT CHANGED    " Details   albuterol (PROAIR HFA/PROVENTIL HFA/VENTOLIN HFA) 108 (90 Base) MCG/ACT inhaler Inhale 2 puffs into the lungs every 6 hours as needed for shortness of breath / dyspnea or wheezing, Disp-1 Inhaler, R-0, E-Prescribe      atorvastatin (LIPITOR) 10 MG tablet Take 1 tablet (10 mg) by mouth daily, Disp-90 tablet, R-3, E-Prescribe      ciprofloxacin (CIPRO) 250 MG tablet Take 0.5 tablets (125 mg) by mouth daily For UTI prevention, Disp-45 tablet, R-3, E-Prescribe      citalopram (CELEXA) 20 MG tablet Take 1 tablet (20 mg) by mouth daily, Disp-30 tablet, R-3, E-Prescribe      clonazePAM (KLONOPIN) 1 MG tablet 1 mg bid and 2 mg qhs, Disp-120 tablet, R-0, No Print OutPhoned to pharmacist, Kimi      eletriptan (RELPAX) 40 MG tablet Take 40 mg by mouth at onset of headache for migraine, Historical      FIBER PO Take 2 tablets by mouth daily, Historical      folic acid (FOLVITE) 400 MCG tablet Take 400 mcg by mouth daily, Historical      hydrOXYzine (ATARAX) 50 MG tablet Take 2 tablets (100 mg) by mouth 2 times daily as needed for anxiety, Disp-120 tablet, R-1, E-PrescribePlease discontinue previous Rx for Vistaril      levothyroxine (SYNTHROID) 50 MCG tablet Take 1 tablet (50 mcg) by mouth daily, Disp-90 tablet, R-3, E-Prescribe      mirabegron (MYRBETRIQ) 25 MG 24 hr tablet Take 1 tablet (25 mg) by mouth daily, Disp-90 tablet, R-1, E-Prescribe      mirtazapine (REMERON) 30 MG tablet Take 2 tablets (60 mg) by mouth At Bedtime, Disp-180 tablet, R-1, E-Prescribe      Multiple Vitamins-Minerals (WOMENS MULTI VITAMIN & MINERAL PO) Take 1 tablet by mouth daily, Historical      oxybutynin ER (DITROPAN-XL) 5 MG 24 hr tablet Take 1 tablet (5 mg) by mouth daily, Disp-90 tablet, R-3, E-Prescribe      pantoprazole (PROTONIX) 40 MG EC tablet Take 1 tablet (40 mg) by mouth every morning (before breakfast), Disp-90 tablet, R-3, E-Prescribe      tolterodine (DETROL LA) 2 MG 24 hr capsule Take 1 capsule (2 mg) by mouth daily,  Disp-90 capsule, R-3, E-Prescribe         STOP taking these medications       acetaminophen (TYLENOL) 325 MG tablet Comments:   Reason for Stopping:         clindamycin (CLINDAGEL) 1 % topical gel Comments:   Reason for Stopping:         cloNIDine (CATAPRES) 0.1 MG tablet Comments:   Reason for Stopping:         cyclobenzaprine (FLEXERIL) 10 MG tablet Comments:   Reason for Stopping:         fluconazole (DIFLUCAN) 100 MG tablet Comments:   Reason for Stopping:         IBUPROFEN PO Comments:   Reason for Stopping:         loperamide (IMODIUM A-D) 2 MG tablet Comments:   Reason for Stopping:         NEW MED Comments:   Reason for Stopping:         Ondansetron (ZOFRAN ODT PO) Comments:   Reason for Stopping:         triamcinolone (KENALOG) 0.1 % ointment Comments:   Reason for Stopping:         UNABLE TO FIND Comments:   Reason for Stopping:                    Discharge Instructions and Follow-Up:     Discharge Procedure Orders   Reason for your hospital stay   Order Comments: overdose     Adult [facility] Follow-up and recommended labs and tests   Order Comments: Follow up with primary care provider, Liborio Lincoln, within 7 - 14 days for hospital follow- up.  No follow up labs or test are needed.      Appointments on [place] and/or [facility] (with [facility] or [facility] provider or service). Call [phone] if you haven't heard regarding these appointments within 7 days of discharge.     Activity   Order Comments: Your activity upon discharge: activity as tolerated     Order Specific Question Answer Comments   Is discharge order? Yes      Discharge Instructions   Order Comments: Follow up Dr Rodriguez at [facility] clinic June 6 at 4 pm.   Follow up with Dr. Lincoln in 1 - 2 weeks to evaluate medication changes.     Diet   Order Comments: Follow this diet upon discharge: Regular     Order Specific Question Answer Comments   Is discharge order? Yes              Discharge Disposition:   Home         Condition on  Discharge:   Discharge condition: Stable   Code status on discharge: Full Code        Date of service: 5/29/2019    The patient was discussed with Dr. Duran.    Kourtney Esposito  [phone]

## 2019-05-29 NOTE — PLAN OF CARE
Discharge instructions discussed with pt and SO at bedside, all questions answered by this writer. Pt ambulated off unit with SO and all possessions, instructed to stop at 3rd floor pharmacy and pickup medications for discharge, stated understanding.

## 2019-05-29 NOTE — PLAN OF CARE
D/I: Pt is calm and cooperative, had an uneventful night, alert and oriented but intermittently drifts mid conversation, otherwise, pleasant and compliant with all cares.  Tolerating regular diet. Has a weak cough. Denies pain.   P: Will continue to monitor and follow POC. Zyprexa available for anxiety or linda symptoms per MD note

## 2019-05-29 NOTE — PLAN OF CARE
Occupational Therapy Discharge Summary    Reason for therapy discharge:    Discharged to home.    Progress towards therapy goal(s). See goals on Care Plan in [facility] electronic health record for goal details.  Goals partially met.  Barriers to achieving goals:   discharge from facility.    Therapy recommendation(s):    No further therapy is recommended.

## 2019-05-30 ENCOUNTER — PATIENT OUTREACH (OUTPATIENT)
Dept: CARE COORDINATION | Facility: CLINIC | Age: 49
End: 2019-05-30

## 2019-05-30 NOTE — LETTER
[facility] CARE COORDINATION  [address]  [facility] [address]  June 7, 2019    Ana Laura Wharton  [address]   [facility] [address]      Dear Ana Laura,    I am a clinic care coordinator who works with Liborio Lincoln MD at the [facility]. I recently tried to call and was unable to reach you. I wanted to introduce myself and provide you with my contact information so that you can call me with questions or concerns about your health care. Below is a description of clinic care coordination and how I can further assist you.     The clinic care coordinator is a registered nurse and/or  who understand the health care system. The goal of clinic care coordination is to help you manage your health and improve access to the [facility] system in the most efficient manner. The registered nurse can assist you in meeting your health care goals by providing education, coordinating services, and strengthening the communication among your providers. The  can assist you with financial, behavioral, psychosocial, chemical dependency, counseling, and/or psychiatric resources.    Please feel free to contact me at [phone], with any questions or concerns. We at [facility] are focused on providing you with the highest-quality healthcare experience possible and that all starts with you.     Sincerely,         Jessica Black [facility]  Clinic Care Coordinator  Ph. [phone]  [email or web address]

## 2019-05-30 NOTE — PLAN OF CARE
Physical Therapy Discharge Summary    Reason for therapy discharge:    Discharged to home.    Progress towards therapy goal(s). See goals on Care Plan in [facility] electronic health record for goal details.  Goals partially met.  Barriers to achieving goals:   discharge from facility.    Therapy recommendation(s):    No further therapy is recommended.

## 2019-05-31 ENCOUNTER — TELEPHONE (OUTPATIENT)
Dept: INTERNAL MEDICINE | Facility: CLINIC | Age: 49
End: 2019-05-31

## 2019-05-31 ASSESSMENT — ACTIVITIES OF DAILY LIVING (ADL): DEPENDENT_IADLS:: INDEPENDENT

## 2019-05-31 NOTE — TELEPHONE ENCOUNTER
IP F/U    Date: 5/29/19  Diagnosis: Overdose, Overdose  Is patient active in care coordination? No  Was patient in TCU? No    Next 5 appointments (look out 90 days)    Jun 04, 2019 10:40 AM CDT  [facility]  [facility] [address]  [facility] [address]  [phone]

## 2019-05-31 NOTE — TELEPHONE ENCOUNTER
"Hospital/TCU/ED for chronic condition Discharge Protocol    \"Hi, my name is Anna Hernandez, a registered nurse, and I am calling from [facility].  I am calling to follow up and see how things are going for you after your recent emergency visit/hospital/TCU stay.\"    Tell me how you are doing now that you are home?\" really well      Discharge Instructions    \"Let's review your discharge instructions.  What is/are the follow-up recommendations?  Pt. Response: I have an appointment next thurs.    \"Has an appointment with your primary care provider been scheduled?\"   Yes. (confirm)   Next 5 appointments (look out 90 days)    Jun 04, 2019 10:40 AM CDT  SHORT with Liborio Lincoln MD  [facility] [address]  [facility] [id number] [phone]            \"When you see the provider, I would recommend that you bring your medications with you.\"    Medications    \"Tell me what changed about your medicines when you discharged?\" they ordered a lot of meds.      Changes to chronic meds?    [facility]    \"What questions do you have about your medications?\"    med questions - Questions cannot be easily answered - Epic MTM referral needed     New diagnoses of heart failure, COPD, diabetes, or MI?    No              Medication reconciliation completed? No, due to wants to talk to mtm  Was MTM referral placed (*Make sure to put transitions as reason for referral)?   Yes - \"The Medication Therapy  will call you within the next few days to schedule an appointment with an [facility] pharmacist to discuss your medicines and make sure they are working as well as they possibly can for you. This visit can be done in a [facility] or on the phone and is at no charge to you.\"    Call Summary    \"What questions or concerns do you have about your recent visit and your follow-up care?\"     none    \"If you have questions or things " "don't continue to improve, we encourage you contact us through the main clinic number (give number).  Even if the clinic is not open, triage nurses are available 24/7 to help you.     We would like you to know that our clinic has extended hours (provide information).  We also have urgent care (provide details on closest location and hours/contact info)\"      \"Thank you for your time and take care!\"             "

## 2019-05-31 NOTE — PROGRESS NOTES
Clinic Care Coordination Contact  [facility]/Voicemail    Referral Source: IP Handoff  Clinical Data: Care Coordinator Outreach  CTS letter received from inpatient Care Coordination. Patient was hospitalized at the [facility] from 5.11.2019 to 5.29.2019 for suicide attempt with beta blocker and clonidine overdose.      Patient has since returned home with orders for outpatient Psychiatry and follow-up with PCP on 6.4.2019. Pt was also encouraged to follow-up with AA and DIEGO support groups.     Outreach attempted x 1.  Left message on voicemail with call back information and requested return call.  Plan: Care Coordinator will mail out care coordination introduction letter with care coordinator contact information and explanation of care coordination services. Care Coordinator will try to reach patient again in 3-5 business days.    Jessica Black, [facility]  Clinic Care Coordinator  Ph. [phone]  [email or web address]

## 2019-06-03 LAB
BACTERIA SPEC CULT: NO GROWTH
Lab: NORMAL
SPECIMEN SOURCE: NORMAL

## 2019-06-04 ENCOUNTER — OFFICE VISIT (OUTPATIENT)
Dept: INTERNAL MEDICINE | Facility: CLINIC | Age: 49
End: 2019-06-04
Payer: COMMERCIAL

## 2019-06-04 VITALS
OXYGEN SATURATION: 99 % | WEIGHT: 105 LBS | BODY MASS INDEX: 16.88 KG/M2 | RESPIRATION RATE: 16 BRPM | HEIGHT: 66 IN | HEART RATE: 97 BPM | TEMPERATURE: 97.3 F | SYSTOLIC BLOOD PRESSURE: 116 MMHG | DIASTOLIC BLOOD PRESSURE: 72 MMHG

## 2019-06-04 DIAGNOSIS — Z09 HOSPITAL DISCHARGE FOLLOW-UP: Primary | ICD-10-CM

## 2019-06-04 DIAGNOSIS — I10 BENIGN ESSENTIAL HYPERTENSION: ICD-10-CM

## 2019-06-04 DIAGNOSIS — F31.4 BIPOLAR DISORDER, CURRENT EPISODE DEPRESSED, SEVERE, WITHOUT PSYCHOTIC FEATURES (H): ICD-10-CM

## 2019-06-04 DIAGNOSIS — E03.8 OTHER SPECIFIED HYPOTHYROIDISM: ICD-10-CM

## 2019-06-04 DIAGNOSIS — T50.902A INTENTIONAL DRUG OVERDOSE, INITIAL ENCOUNTER (H): ICD-10-CM

## 2019-06-04 DIAGNOSIS — Z12.31 ENCOUNTER FOR SCREENING MAMMOGRAM FOR BREAST CANCER: ICD-10-CM

## 2019-06-04 DIAGNOSIS — I82.90 ACUTE DEEP VEIN THROMBOSIS (DVT) OF NON-EXTREMITY VEIN: ICD-10-CM

## 2019-06-04 LAB
ERYTHROCYTE [DISTWIDTH] IN BLOOD BY AUTOMATED COUNT: 16.7 % (ref 10–15)
HCT VFR BLD AUTO: 40.2 % (ref 35–47)
HGB BLD-MCNC: 12.8 G/DL (ref 11.7–15.7)
MCH RBC QN AUTO: 31.7 PG (ref 26.5–33)
MCHC RBC AUTO-ENTMCNC: 31.8 G/DL (ref 31.5–36.5)
MCV RBC AUTO: 100 FL (ref 78–100)
PLATELET # BLD AUTO: 510 10E9/L (ref 150–450)
RBC # BLD AUTO: 4.04 10E12/L (ref 3.8–5.2)
WBC # BLD AUTO: 13.2 10E9/L (ref 4–11)

## 2019-06-04 PROCEDURE — 85027 COMPLETE CBC AUTOMATED: CPT | Performed by: INTERNAL MEDICINE

## 2019-06-04 PROCEDURE — 36415 COLL VENOUS BLD VENIPUNCTURE: CPT | Performed by: INTERNAL MEDICINE

## 2019-06-04 PROCEDURE — 99495 TRANSJ CARE MGMT MOD F2F 14D: CPT | Performed by: INTERNAL MEDICINE

## 2019-06-04 PROCEDURE — 80053 COMPREHEN METABOLIC PANEL: CPT | Performed by: INTERNAL MEDICINE

## 2019-06-04 RX ORDER — CYCLOBENZAPRINE HCL 5 MG
10 TABLET ORAL AT BEDTIME
COMMUNITY
End: 2019-08-02

## 2019-06-04 RX ORDER — CARVEDILOL 12.5 MG/1
12.5 TABLET ORAL 2 TIMES DAILY WITH MEALS
Qty: 180 TABLET | Refills: 3 | Status: SHIPPED | OUTPATIENT
Start: 2019-06-04 | End: 2020-01-13

## 2019-06-04 RX ORDER — AMLODIPINE BESYLATE 10 MG/1
10 TABLET ORAL DAILY
Qty: 90 TABLET | Refills: 3 | Status: SHIPPED | OUTPATIENT
Start: 2019-06-04 | End: 2019-12-13

## 2019-06-04 ASSESSMENT — MIFFLIN-ST. JEOR: SCORE: 1123.03

## 2019-06-04 NOTE — NURSING NOTE
"Vital signs:  Temp: 97.3  F (36.3  C) Temp src: Oral BP: 116/72 Pulse: 97   Resp: 16 SpO2: 99 %     Height: 167.6 cm (5' 6\") Weight: 47.6 kg (105 lb)  Estimated body mass index is 16.95 kg/m  as calculated from the following:    Height as of this encounter: 1.676 m (5' 6\").    Weight as of this encounter: 47.6 kg (105 lb).          "

## 2019-06-04 NOTE — PROGRESS NOTES
Subjective     Ana Laura Wharton is a 48 year old female who presents to clinic today for the following health issues:    [facility]       Hospital Follow-up Visit:    Hospital/Nursing Home/IP Rehab Facility: [facility]  Date of Admission: 05/11/19  Date of Discharge: 05/29/19  Reason(s) for Admission: Suicide attempt, Cardiogenic shock secondary to intentional carvedilol and clonidine overdose            Problems taking medications regularly:  None       Medication changes since discharge: None       Problems adhering to non-medication therapy:  None    Summary of hospitalization:  [facility] discharge summary reviewed  Diagnostic Tests/Treatments reviewed.  Follow up needed: lab work   Other Healthcare Providers Involved in Patient s Care:         Specialist appointment - psychiatry   Update since discharge: improved.       Post Discharge Medication Reconciliation: discharge medications reconciled, continue medications without change.  Plan of care communicated with patient     Coding guidelines for this visit:  Type of Medical   Decision Making Face-to-Face Visit       within 7 Days of discharge Face-to-Face Visit        within 14 days of discharge   Moderate Complexity [id number]   High Complexity [id number]            ED/UC Followup:    Facility:  [facility]  Date of visit: 05/11/2019  Reason for visit: Suicide attempt, shock, acute pancreatitis  Current Status: improved        Patient is seen for a follow up visit.  Recently hospitalized for drugs overdose with suicidal attempt.   Took multiple BP medications. Presented with cardiovascular shock , respiratory failure, aspirated, needed intubation, pressors treatment, fluids , antibiotics.   Gradually recovered.   Seen by psychiatry, on Celexa, Zyprexa, Clonopin. Will be following in 2 days as outpatient. Denies suicidal ideation currently. Has a daughter , brother and mom , keeps in touch. Lives alone.   Has HTN history, restarted on Coreg,  dose increased, started on Amlodipine. Controlled BP.   Has h/o hypothyroidism, on Synthroid.         Patient Active Problem List   Diagnosis     Depressed bipolar I disorder in full remission (H)     Anxiety disorder     Suicide ideation     Bipolar disorder current episode depressed (H)     Overdose     Depression     Mood disorder (H)     Suicidal ideations     Bipolar I disorder (H)     Acute renal failure (H)     Alternating exotropia     Anxiety state     Other bipolar disorders     Personality disorder (H)     Tear film insufficiency     Dysfunctions associated with sleep stages or arousal from sleep     Abnormal finding of blood chemistry     Esophageal reflux     Other specified hypothyroidism     Impulse control disorder     Acidosis     Low back pain     Myopia     Opioid dependence (H)     Orofacial dyskinesia     Intractable migraine     Pure hypercholesterolemia     Poisoning by 4-aminophenol derivatives, undetermined intent     Essential hypertension     Left knee pain     Aftercare following surgery of the musculoskeletal system     UTI (urinary tract infection)     Acute cystitis     Clostridium difficile colitis     Drug overdose     Calcium channel blocker overdose     Intentional acetaminophen overdose (H)     Alcoholic intoxication with complication (H)     Hypotension due to medication     Acute renal failure, unspecified acute renal failure type (H)     Paroxysmal atrial fibrillation (H)     Acute respiratory failure with hypoxia (H)     Acute pulmonary edema (H)     Spontaneous pneumothorax     PRES (posterior reversible encephalopathy syndrome)     Pancreatitis     Atypical squamous cells cannot exclude high grade squamous intraepithelial lesion on cytologic smear of cervix (ASC-H)     Past Surgical History:   Procedure Laterality Date     ARTHROSCOPY KNEE      L knee     CERVIX SURGERY      for pre-cancerous changes     left knee surgery       ORTHOPEDIC SURGERY      L shoulder     THORACIC  SURGERY      for pneumothorax, pleurodesis and lobe resection       Social History     Tobacco Use     Smoking status: Current Some Day Smoker     Types: Cigarettes     Smokeless tobacco: Never Used     Tobacco comment: quit 05/07/2019   Substance Use Topics     Alcohol use: No     Alcohol/week: 1.2 oz     Types: 2 Glasses of wine per week     Comment: Quit on 09/07/18     Family History   Problem Relation Age of Onset     Depression Mother      Lipids Father         hyperlipidemia     Macular Degeneration Father          Current Outpatient Medications   Medication Sig Dispense Refill     albuterol (PROAIR HFA/PROVENTIL HFA/VENTOLIN HFA) 108 (90 Base) MCG/ACT inhaler Inhale 2 puffs into the lungs every 6 hours as needed for shortness of breath / dyspnea or wheezing 1 Inhaler 0     amLODIPine (NORVASC) 10 MG tablet Take 1 tablet (10 mg) by mouth daily 90 tablet 3     apixaban ANTICOAGULANT (ELIQUIS) 5 MG tablet Take 1 tablet (5 mg) by mouth 2 times daily 120 tablet 0     atorvastatin (LIPITOR) 10 MG tablet Take 1 tablet (10 mg) by mouth daily 90 tablet 3     carvedilol (COREG) 12.5 MG tablet Take 1 tablet (12.5 mg) by mouth 2 times daily (with meals) 180 tablet 3     ciprofloxacin (CIPRO) 250 MG tablet Take 0.5 tablets (125 mg) by mouth daily For UTI prevention 45 tablet 3     citalopram (CELEXA) 20 MG tablet Take 1 tablet (20 mg) by mouth daily 30 tablet 3     clonazePAM (KLONOPIN) 1 MG tablet 1 mg bid and 2 mg qhs 120 tablet 0     cyclobenzaprine (FLEXERIL) 5 MG tablet Take 5 mg by mouth At Bedtime       eletriptan (RELPAX) 40 MG tablet Take 40 mg by mouth at onset of headache for migraine       FIBER PO Take 2 tablets by mouth daily       folic acid (FOLVITE) 400 MCG tablet Take 400 mcg by mouth daily       hydrOXYzine (ATARAX) 50 MG tablet Take 2 tablets (100 mg) by mouth 2 times daily as needed for anxiety 120 tablet 1     levothyroxine (SYNTHROID) 50 MCG tablet Take 1 tablet (50 mcg) by mouth daily 90 tablet 3      mirabegron (MYRBETRIQ) 25 MG 24 hr tablet Take 1 tablet (25 mg) by mouth daily 90 tablet 1     mirtazapine (REMERON) 30 MG tablet Take 2 tablets (60 mg) by mouth At Bedtime 180 tablet 1     Multiple Vitamins-Minerals (WOMENS MULTI VITAMIN & MINERAL PO) Take 1 tablet by mouth daily       OLANZapine (ZYPREXA) 10 MG tablet 1 tablet (10 mg) by Oral or Feeding Tube route At Bedtime 30 tablet 1     oxybutynin ER (DITROPAN-XL) 5 MG 24 hr tablet Take 1 tablet (5 mg) by mouth daily 90 tablet 3     pantoprazole (PROTONIX) 40 MG EC tablet Take 1 tablet (40 mg) by mouth every morning (before breakfast) 90 tablet 3         Reviewed and updated as needed this visit by Provider         Review of Systems   ROS COMP: Constitutional, HEENT, cardiovascular, pulmonary, GI, , musculoskeletal, neuro, skin, endocrine and psych systems are negative, except as otherwise noted.      Objective    There were no vitals taken for this visit.  There is no height or weight on file to calculate BMI.  Physical Exam   GENERAL:frail, anxious, alert and no distress  EYES: Eyes grossly normal to inspection, PERRL and conjunctivae and sclerae normal  HENT: ear canals and TM's normal, nose and mouth without ulcers or lesions  NECK: no adenopathy, no asymmetry, masses, or scars and thyroid normal to palpation  RESP: lungs clear to auscultation - no rales, rhonchi or wheezes  CV: regular rate and rhythm, normal S1 S2, no S3 or S4, no murmur, click or rub, no peripheral edema and peripheral pulses strong  ABDOMEN: soft, nontender, no hepatosplenomegaly, no masses and bowel sounds normal  MS: no gross musculoskeletal defects noted, no edema  SKIN: no suspicious lesions or rashes  NEURO: Normal strength and tone, mentation intact and speech normal, anxious, jittery     Diagnostic Test Results:  Labs reviewed in Epic        Assessment & Plan   Problem List Items Addressed This Visit     Bipolar disorder current episode depressed (H)    Other specified  hypothyroidism    Drug overdose      Other Visit Diagnoses     Hospital discharge follow-up    -  Primary    Acute deep vein thrombosis (DVT) of non-extremity vein        Relevant Medications    apixaban ANTICOAGULANT (ELIQUIS) 5 MG tablet    Benign essential hypertension        Relevant Medications    amLODIPine (NORVASC) 10 MG tablet    carvedilol (COREG) 12.5 MG tablet    Other Relevant Orders    CBC with platelets    Comprehensive metabolic panel    Encounter for screening mammogram for breast cancer        Relevant Orders    *MA Screening Digital Bilateral         Continue treatment   Psychiatry follow up   Assess lab work       Tobacco Cessation:   reports that she has been smoking cigarettes.  She has never used smokeless tobacco.  Tobacco Cessation Action Plan: Information offered: Patient not interested at this time        See Patient Instructions  Return in about 3 months (around 9/4/2019) for Routine Visit.    Liborio Lincoln MD  [facility]

## 2019-06-04 NOTE — LETTER
June 6, 2019      Ana Laura LÓPEZ Dio  [id number]   [place] [place] [facility] [address]        Dear ,    We are writing to inform you of your test results.    Normal result reviewed,   Slightly elevated WBC and platelets. Suggest to recheck in one month.     Resulted Orders   CBC with platelets   Result Value Ref Range    WBC 13.2 (H) 4.0 - 11.0 10e9/L    RBC Count 4.04 3.8 - 5.2 10e12/L    Hemoglobin 12.8 11.7 - 15.7 g/dL    Hematocrit 40.2 35.0 - 47.0 %     78 - 100 fl    MCH 31.7 26.5 - 33.0 pg    MCHC 31.8 31.5 - 36.5 g/dL    RDW 16.7 (H) 10.0 - 15.0 %    Platelet Count 510 (H) 150 - 450 10e9/L      Comment:      Results confirmed by repeat test   Comprehensive metabolic panel   Result Value Ref Range    Sodium 137 133 - 144 mmol/L    Potassium 4.0 3.4 - 5.3 mmol/L    Chloride 104 94 - 109 mmol/L    Carbon Dioxide 24 20 - 32 mmol/L    Anion Gap 9 3 - 14 mmol/L    Glucose 70 70 - 99 mg/dL      Comment:      Fasting specimen    Urea Nitrogen 18 7 - 30 mg/dL    Creatinine 0.76 0.52 - 1.04 mg/dL    GFR Estimate >90 >60 mL/min/[1.73_m2]      Comment:      Non  GFR Calc  Starting 12/18/2018, serum creatinine based estimated GFR (eGFR) will be   calculated using the Chronic Kidney Disease Epidemiology Collaboration   (CKD-EPI) equation.      GFR Estimate If Black >90 >60 mL/min/[1.73_m2]      Comment:       GFR Calc  Starting 12/18/2018, serum creatinine based estimated GFR (eGFR) will be   calculated using the Chronic Kidney Disease Epidemiology Collaboration   (CKD-EPI) equation.      Calcium 9.4 8.5 - 10.1 mg/dL    Bilirubin Total 0.4 0.2 - 1.3 mg/dL    Albumin 4.3 3.4 - 5.0 g/dL    Protein Total 8.3 6.8 - 8.8 g/dL    Alkaline Phosphatase 139 40 - 150 U/L    ALT 24 0 - 50 U/L    AST 24 0 - 45 U/L       If you have any questions or concerns, please call the clinic at the number listed above.       Sincerely,        Liborio Lincoln MD

## 2019-06-05 LAB
ALBUMIN SERPL-MCNC: 4.3 G/DL (ref 3.4–5)
ALP SERPL-CCNC: 139 U/L (ref 40–150)
ALT SERPL W P-5'-P-CCNC: 24 U/L (ref 0–50)
ANION GAP SERPL CALCULATED.3IONS-SCNC: 9 MMOL/L (ref 3–14)
AST SERPL W P-5'-P-CCNC: 24 U/L (ref 0–45)
BILIRUB SERPL-MCNC: 0.4 MG/DL (ref 0.2–1.3)
BUN SERPL-MCNC: 18 MG/DL (ref 7–30)
CALCIUM SERPL-MCNC: 9.4 MG/DL (ref 8.5–10.1)
CHLORIDE SERPL-SCNC: 104 MMOL/L (ref 94–109)
CO2 SERPL-SCNC: 24 MMOL/L (ref 20–32)
CREAT SERPL-MCNC: 0.76 MG/DL (ref 0.52–1.04)
GFR SERPL CREATININE-BSD FRML MDRD: >90 ML/MIN/{1.73_M2}
GLUCOSE SERPL-MCNC: 70 MG/DL (ref 70–99)
POTASSIUM SERPL-SCNC: 4 MMOL/L (ref 3.4–5.3)
PROT SERPL-MCNC: 8.3 G/DL (ref 6.8–8.8)
SODIUM SERPL-SCNC: 137 MMOL/L (ref 133–144)

## 2019-06-06 ENCOUNTER — OFFICE VISIT (OUTPATIENT)
Dept: PSYCHIATRY | Facility: CLINIC | Age: 49
End: 2019-06-06
Attending: PSYCHIATRY & NEUROLOGY
Payer: COMMERCIAL

## 2019-06-06 VITALS
BODY MASS INDEX: 17.72 KG/M2 | DIASTOLIC BLOOD PRESSURE: 85 MMHG | HEART RATE: 91 BPM | WEIGHT: 109.8 LBS | SYSTOLIC BLOOD PRESSURE: 129 MMHG

## 2019-06-06 DIAGNOSIS — F31.9 BIPOLAR I DISORDER (H): ICD-10-CM

## 2019-06-06 PROCEDURE — G0463 HOSPITAL OUTPT CLINIC VISIT: HCPCS | Mod: ZF

## 2019-06-06 RX ORDER — CLONAZEPAM 1 MG/1
2 TABLET ORAL AT BEDTIME
Qty: 1 TABLET | Refills: 0
Start: 2019-06-06 | End: 2019-08-16

## 2019-06-06 RX ORDER — OLANZAPINE 10 MG/1
20 TABLET ORAL AT BEDTIME
Qty: 1 TABLET | Refills: 0
Start: 2019-06-06 | End: 2019-07-16

## 2019-06-06 ASSESSMENT — PAIN SCALES - GENERAL: PAINLEVEL: NO PAIN (0)

## 2019-06-07 NOTE — PROGRESS NOTES
Clinic Care Coordination Contact    Situation: Patient chart reviewed by care coordinator.    Background: Patient previously outreached to Patient on 5.31.2019 in follow-up to her hospitalization at the [facility] from 5.11.2019 to 5.29.2019 for suicide attempt with beta blocker and clonidine overdose.      Patient has since discharged home and appropriately followed up with Psychiatry and PCP. Ongoing appointments are also scheduled.    Assessment: No identified need to clinic care coordination, as Pt appears to be appropriately follow-up with her care team and scheduling the appropriate appointments.    Plan/Recommendations: [employer] message sent to Pt introducing SW CC's role and offering assistance. No further outreaches will be made at this time unless a new referral is made or a change in the pt's status occurs. Patient was provided with this writer's contact information and encouraged to call with any questions or concerns.    Jessica Black, [facility]  Clinic Care Coordinator  Ph. [phone]  [email or web address]

## 2019-06-14 ENCOUNTER — OFFICE VISIT (OUTPATIENT)
Dept: INTERNAL MEDICINE | Facility: CLINIC | Age: 49
End: 2019-06-14
Payer: COMMERCIAL

## 2019-06-14 VITALS
BODY MASS INDEX: 18.08 KG/M2 | HEIGHT: 66 IN | RESPIRATION RATE: 16 BRPM | DIASTOLIC BLOOD PRESSURE: 80 MMHG | SYSTOLIC BLOOD PRESSURE: 122 MMHG | WEIGHT: 112.5 LBS | TEMPERATURE: 98.4 F | HEART RATE: 103 BPM | OXYGEN SATURATION: 98 %

## 2019-06-14 DIAGNOSIS — N89.8 VAGINAL DISCHARGE: Primary | ICD-10-CM

## 2019-06-14 DIAGNOSIS — B96.89 BV (BACTERIAL VAGINOSIS): ICD-10-CM

## 2019-06-14 DIAGNOSIS — F31.9 BIPOLAR I DISORDER (H): ICD-10-CM

## 2019-06-14 DIAGNOSIS — N76.0 BV (BACTERIAL VAGINOSIS): ICD-10-CM

## 2019-06-14 LAB
SPECIMEN SOURCE: ABNORMAL
WET PREP SPEC: ABNORMAL

## 2019-06-14 PROCEDURE — 99213 OFFICE O/P EST LOW 20 MIN: CPT | Performed by: INTERNAL MEDICINE

## 2019-06-14 PROCEDURE — 87210 SMEAR WET MOUNT SALINE/INK: CPT | Performed by: INTERNAL MEDICINE

## 2019-06-14 RX ORDER — METRONIDAZOLE 7.5 MG/G
1 GEL VAGINAL DAILY
Qty: 70 G | Refills: 0 | Status: SHIPPED | OUTPATIENT
Start: 2019-06-14 | End: 2019-06-14

## 2019-06-14 RX ORDER — METRONIDAZOLE 7.5 MG/G
1 GEL VAGINAL DAILY
Qty: 70 G | Refills: 0 | Status: SHIPPED | OUTPATIENT
Start: 2019-06-14 | End: 2019-06-24

## 2019-06-14 RX ORDER — ONDANSETRON 4 MG/1
TABLET, FILM COATED ORAL EVERY 8 HOURS PRN
COMMUNITY
End: 2019-06-24

## 2019-06-14 ASSESSMENT — MIFFLIN-ST. JEOR: SCORE: 1157.05

## 2019-06-14 NOTE — PROGRESS NOTES
Subjective     Ana Laura Wharton is a 48 year old female who presents to clinic today for the following health issues:    HPI   She has redness and tenderness on her vaginal area and also has some discharge.    Presents with symptoms of whitish vaginal discharge and vulvar redness for about a week. No genital ulcers. Has had mild pain with sexual intercourse. No pelvic pain, fever, dysuria.     PROBLEMS TO ADD ON...  Has h/o bipolar disorder. Sees psychiatry. On treatment, reports improved depression symptoms. No suicidal ideation.    Patient Active Problem List   Diagnosis     Depressed bipolar I disorder in full remission (H)     Anxiety disorder     Suicide ideation     Bipolar disorder current episode depressed (H)     Overdose     Depression     Mood disorder (H)     Suicidal ideations     Bipolar I disorder (H)     Acute renal failure (H)     Alternating exotropia     Anxiety state     Other bipolar disorders     Personality disorder (H)     Tear film insufficiency     Dysfunctions associated with sleep stages or arousal from sleep     Abnormal finding of blood chemistry     Esophageal reflux     Other specified hypothyroidism     Impulse control disorder     Acidosis     Low back pain     Myopia     Opioid dependence (H)     Orofacial dyskinesia     Intractable migraine     Pure hypercholesterolemia     Poisoning by 4-aminophenol derivatives, undetermined intent     Essential hypertension     Left knee pain     Aftercare following surgery of the musculoskeletal system     UTI (urinary tract infection)     Acute cystitis     Clostridium difficile colitis     Drug overdose     Calcium channel blocker overdose     Intentional acetaminophen overdose (H)     Alcoholic intoxication with complication (H)     Hypotension due to medication     Acute renal failure, unspecified acute renal failure type (H)     Paroxysmal atrial fibrillation (H)     Acute respiratory failure with hypoxia (H)     Acute pulmonary edema (H)      Spontaneous pneumothorax     PRES (posterior reversible encephalopathy syndrome)     Pancreatitis     Atypical squamous cells cannot exclude high grade squamous intraepithelial lesion on cytologic smear of cervix (ASC-H)     Past Surgical History:   Procedure Laterality Date     ARTHROSCOPY KNEE      L knee     CERVIX SURGERY      for pre-cancerous changes     left knee surgery       ORTHOPEDIC SURGERY      L shoulder     THORACIC SURGERY      for pneumothorax, pleurodesis and lobe resection       Social History     Tobacco Use     Smoking status: Current Some Day Smoker     Types: Cigarettes     Smokeless tobacco: Never Used     Tobacco comment: quit 05/07/2019   Substance Use Topics     Alcohol use: No     Alcohol/week: 1.2 oz     Types: 2 Glasses of wine per week     Comment: Quit on 09/07/18     Family History   Problem Relation Age of Onset     Depression Mother      Lipids Father         hyperlipidemia     Macular Degeneration Father          Current Outpatient Medications   Medication Sig Dispense Refill     albuterol (PROAIR HFA/PROVENTIL HFA/VENTOLIN HFA) 108 (90 Base) MCG/ACT inhaler Inhale 2 puffs into the lungs every 6 hours as needed for shortness of breath / dyspnea or wheezing 1 Inhaler 0     amLODIPine (NORVASC) 10 MG tablet Take 1 tablet (10 mg) by mouth daily 90 tablet 3     apixaban ANTICOAGULANT (ELIQUIS) 5 MG tablet Take 1 tablet (5 mg) by mouth 2 times daily 120 tablet 0     atorvastatin (LIPITOR) 10 MG tablet Take 1 tablet (10 mg) by mouth daily 90 tablet 3     carvedilol (COREG) 12.5 MG tablet Take 1 tablet (12.5 mg) by mouth 2 times daily (with meals) 180 tablet 3     ciprofloxacin (CIPRO) 250 MG tablet Take 0.5 tablets (125 mg) by mouth daily For UTI prevention 45 tablet 3     citalopram (CELEXA) 20 MG tablet Take 1 tablet (20 mg) by mouth daily 30 tablet 3     clonazePAM (KLONOPIN) 1 MG tablet Take 2 tablets (2 mg) by mouth At Bedtime 1 tablet 0     cyclobenzaprine (FLEXERIL) 5 MG  tablet Take 10 mg by mouth At Bedtime        eletriptan (RELPAX) 40 MG tablet Take 40 mg by mouth at onset of headache for migraine       FIBER PO Take 2 tablets by mouth daily       folic acid (FOLVITE) 400 MCG tablet Take 400 mcg by mouth daily       hydrOXYzine (ATARAX) 50 MG tablet Take 2 tablets (100 mg) by mouth 2 times daily as needed for anxiety 120 tablet 1     levothyroxine (SYNTHROID) 50 MCG tablet Take 1 tablet (50 mcg) by mouth daily 90 tablet 3     mirabegron (MYRBETRIQ) 25 MG 24 hr tablet Take 1 tablet (25 mg) by mouth daily 90 tablet 1     mirtazapine (REMERON) 30 MG tablet Take 2 tablets (60 mg) by mouth At Bedtime 180 tablet 1     Multiple Vitamins-Minerals (WOMENS MULTI VITAMIN & MINERAL PO) Take 1 tablet by mouth daily       OLANZapine (ZYPREXA) 10 MG tablet Take 2 tablets (20 mg) by mouth At Bedtime 1 tablet 0     ondansetron (ZOFRAN) 4 MG tablet Take by mouth every 8 hours as needed for nausea       oxybutynin ER (DITROPAN-XL) 5 MG 24 hr tablet Take 1 tablet (5 mg) by mouth daily 90 tablet 3     pantoprazole (PROTONIX) 40 MG EC tablet Take 1 tablet (40 mg) by mouth every morning (before breakfast) 90 tablet 3         Reviewed and updated as needed this visit by Provider         Review of Systems   ROS COMP: Constitutional, HEENT, cardiovascular, pulmonary, gi and gu systems are negative, except as otherwise noted.      Objective    There were no vitals taken for this visit.  There is no height or weight on file to calculate BMI.  Physical Exam   GENERAL: healthy, alert and no distress  ABDOMEN: soft, nontender, no hepatosplenomegaly, no masses and bowel sounds normal   (female): normal female external genitalia, normal urethral meatus, vaginal mucosa, normal cervix/adnexa/uterus without masses , scarce whitish vaginal discharge, mild labial erythema   MS: no gross musculoskeletal defects noted, no edema    Diagnostic Test Results:  Labs reviewed in Epic  Results for orders placed or performed  in visit on 06/14/19 (from the past 24 hour(s))   Wet prep   Result Value Ref Range    Specimen Description Vagina     Wet Prep No Trichomonas seen     Wet Prep Clue cells seen  Moderate   (A)     Wet Prep No yeast seen     Wet Prep No WBC's seen            Assessment & Plan   Problem List Items Addressed This Visit     Bipolar I disorder (H)      Other Visit Diagnoses     Vaginal discharge    -  Primary    Relevant Orders    Wet prep (Completed)           Assess for yeast, vaginosis            See Patient Instructions  No follow-ups on file.    Liborio Lincoln MD  [facility]

## 2019-06-24 ENCOUNTER — NURSE TRIAGE (OUTPATIENT)
Dept: NURSING | Facility: CLINIC | Age: 49
End: 2019-06-24

## 2019-06-24 ENCOUNTER — TELEPHONE (OUTPATIENT)
Dept: INTERNAL MEDICINE | Facility: CLINIC | Age: 49
End: 2019-06-24

## 2019-06-24 ENCOUNTER — ANCILLARY PROCEDURE (OUTPATIENT)
Dept: GENERAL RADIOLOGY | Facility: CLINIC | Age: 49
End: 2019-06-24
Attending: INTERNAL MEDICINE
Payer: COMMERCIAL

## 2019-06-24 ENCOUNTER — OFFICE VISIT (OUTPATIENT)
Dept: INTERNAL MEDICINE | Facility: CLINIC | Age: 49
End: 2019-06-24
Payer: COMMERCIAL

## 2019-06-24 VITALS
HEART RATE: 116 BPM | TEMPERATURE: 98.6 F | HEIGHT: 66 IN | WEIGHT: 112 LBS | SYSTOLIC BLOOD PRESSURE: 128 MMHG | RESPIRATION RATE: 16 BRPM | DIASTOLIC BLOOD PRESSURE: 86 MMHG | BODY MASS INDEX: 18 KG/M2 | OXYGEN SATURATION: 99 %

## 2019-06-24 DIAGNOSIS — R10.84 ABDOMINAL PAIN, GENERALIZED: Primary | ICD-10-CM

## 2019-06-24 DIAGNOSIS — I10 ESSENTIAL HYPERTENSION: ICD-10-CM

## 2019-06-24 DIAGNOSIS — F31.9 BIPOLAR I DISORDER (H): ICD-10-CM

## 2019-06-24 DIAGNOSIS — I48.0 PAROXYSMAL ATRIAL FIBRILLATION (H): ICD-10-CM

## 2019-06-24 LAB
ALBUMIN SERPL-MCNC: 4.2 G/DL (ref 3.4–5)
ALBUMIN UR-MCNC: NEGATIVE MG/DL
ALP SERPL-CCNC: 105 U/L (ref 40–150)
ALT SERPL W P-5'-P-CCNC: 24 U/L (ref 0–50)
AMORPH CRY #/AREA URNS HPF: ABNORMAL /HPF
AMYLASE SERPL-CCNC: 35 U/L (ref 30–110)
ANION GAP SERPL CALCULATED.3IONS-SCNC: 7 MMOL/L (ref 3–14)
APPEARANCE UR: CLEAR
AST SERPL W P-5'-P-CCNC: 35 U/L (ref 0–45)
BACTERIA #/AREA URNS HPF: ABNORMAL /HPF
BILIRUB SERPL-MCNC: 0.3 MG/DL (ref 0.2–1.3)
BILIRUB UR QL STRIP: NEGATIVE
BUN SERPL-MCNC: 11 MG/DL (ref 7–30)
CALCIUM SERPL-MCNC: 9 MG/DL (ref 8.5–10.1)
CHLORIDE SERPL-SCNC: 103 MMOL/L (ref 94–109)
CO2 SERPL-SCNC: 28 MMOL/L (ref 20–32)
COLOR UR AUTO: YELLOW
CREAT SERPL-MCNC: 0.64 MG/DL (ref 0.52–1.04)
ERYTHROCYTE [DISTWIDTH] IN BLOOD BY AUTOMATED COUNT: 14.4 % (ref 10–15)
GFR SERPL CREATININE-BSD FRML MDRD: >90 ML/MIN/{1.73_M2}
GLUCOSE SERPL-MCNC: 103 MG/DL (ref 70–99)
GLUCOSE UR STRIP-MCNC: NEGATIVE MG/DL
HCT VFR BLD AUTO: 41.7 % (ref 35–47)
HGB BLD-MCNC: 13.6 G/DL (ref 11.7–15.7)
HGB UR QL STRIP: ABNORMAL
KETONES UR STRIP-MCNC: NEGATIVE MG/DL
LEUKOCYTE ESTERASE UR QL STRIP: NEGATIVE
LIPASE SERPL-CCNC: 129 U/L (ref 73–393)
MCH RBC QN AUTO: 30.7 PG (ref 26.5–33)
MCHC RBC AUTO-ENTMCNC: 32.6 G/DL (ref 31.5–36.5)
MCV RBC AUTO: 94 FL (ref 78–100)
NITRATE UR QL: NEGATIVE
NON-SQ EPI CELLS #/AREA URNS LPF: ABNORMAL /LPF
PH UR STRIP: 8 PH (ref 5–7)
PLATELET # BLD AUTO: 375 10E9/L (ref 150–450)
POTASSIUM SERPL-SCNC: 3.5 MMOL/L (ref 3.4–5.3)
PROT SERPL-MCNC: 8.1 G/DL (ref 6.8–8.8)
RBC # BLD AUTO: 4.43 10E12/L (ref 3.8–5.2)
RBC #/AREA URNS AUTO: ABNORMAL /HPF
SODIUM SERPL-SCNC: 138 MMOL/L (ref 133–144)
SOURCE: ABNORMAL
SP GR UR STRIP: 1.01 (ref 1–1.03)
UROBILINOGEN UR STRIP-ACNC: 0.2 EU/DL (ref 0.2–1)
WBC # BLD AUTO: 9.8 10E9/L (ref 4–11)
WBC #/AREA URNS AUTO: ABNORMAL /HPF

## 2019-06-24 PROCEDURE — 80053 COMPREHEN METABOLIC PANEL: CPT | Performed by: INTERNAL MEDICINE

## 2019-06-24 PROCEDURE — 82150 ASSAY OF AMYLASE: CPT | Performed by: INTERNAL MEDICINE

## 2019-06-24 PROCEDURE — 36415 COLL VENOUS BLD VENIPUNCTURE: CPT | Performed by: INTERNAL MEDICINE

## 2019-06-24 PROCEDURE — 81001 URINALYSIS AUTO W/SCOPE: CPT | Performed by: INTERNAL MEDICINE

## 2019-06-24 PROCEDURE — 99214 OFFICE O/P EST MOD 30 MIN: CPT | Performed by: INTERNAL MEDICINE

## 2019-06-24 PROCEDURE — 85027 COMPLETE CBC AUTOMATED: CPT | Performed by: INTERNAL MEDICINE

## 2019-06-24 PROCEDURE — 83690 ASSAY OF LIPASE: CPT | Performed by: INTERNAL MEDICINE

## 2019-06-24 PROCEDURE — 74019 RADEX ABDOMEN 2 VIEWS: CPT

## 2019-06-24 RX ORDER — OLANZAPINE 20 MG/1
TABLET ORAL
Qty: 90 TABLET | Refills: 0 | OUTPATIENT
Start: 2019-06-24

## 2019-06-24 RX ORDER — ONDANSETRON 4 MG/1
4 TABLET, FILM COATED ORAL EVERY 8 HOURS PRN
Qty: 30 TABLET | Refills: 0 | Status: ON HOLD | OUTPATIENT
Start: 2019-06-24 | End: 2022-01-01

## 2019-06-24 ASSESSMENT — MIFFLIN-ST. JEOR: SCORE: 1154.78

## 2019-06-24 NOTE — TELEPHONE ENCOUNTER
"Patient states she was in ICU about 3 weeks ago and for the past 10 days she has had some \"tummy pain\" and severe nausea.  States pain is above navel and is intermittent.  Tympanic temperature is 99.3.  New medication is Eliquis.  Transferred to [facility] to be seen within 24 hours.    Reason for Disposition    Taking prescription medication that could cause nausea (e.g., narcotics/opiates, antibiotics, OCPs, many others)    Additional Information    Negative: Shock suspected (e.g., cold/pale/clammy skin, too weak to stand, low BP, rapid pulse)    Negative: Sounds like a life-threatening emergency to the triager    Negative: [1] Nausea or vomiting AND [2] pregnancy < 20 weeks    Negative: Menstrual Period - Missed or Late (i.e., pregnancy suspected)    Negative: Heat exhaustion suspected (i.e., dehydration from heat exposure)    Negative: Motion sickness suspected (i.e., nausea with car, plane, boat, or train travel)    Negative: Anxiety or stress suspected (i.e., nausea with anxiety attacks or stressful situations)    Negative: Traumatic Brain Injury (TBI) suspected    Negative: Nausea (or Vomiting) in a cancer patient who is currently (or recently) receiving chemotherapy or radiation therapy, or cancer patient who has metastatic or end-stage cancer and is receiving palliative care    Negative: Vomiting occurs    Negative: Other symptom is present, see that guideline.  (e.g., chest pain, headache, dizziness, abdominal pain, colds, sore throat, etc.).    Negative: Unable to walk, or can only walk with assistance (e.g., requires support)    Negative: Difficulty breathing    Negative: [1] Insulin-dependent diabetes (Type I) AND [2] glucose > 400 mg/dl (22 mmol/l)    Negative: [1] Drinking very little AND [2] dehydration suspected (e.g., no urine > 12 hours, very dry mouth, very lightheaded)    Negative: Patient sounds very sick or weak to the triager    Negative: Fever > 104 F (40 C)    Negative: [1] Fever > 101 F " (38.3 C) AND [2] age > 60    Negative: [1] Fever > 100.0 F (37.8 C) AND [2] bedridden (e.g., nursing home patient, CVA, chronic illness, recovering from surgery)    Negative: [1] Fever > 100.0 F (37.8 C) AND [2] diabetes mellitus or weak immune system (e.g., HIV positive, cancer chemo, splenectomy, chronic steroids)    Negative: Taking any of the following medications: digoxin (Lanoxin), lithium, theophylline, phenytoin (Dilantin)    Negative: Yellowish color of the skin or white of the eye (i.e., jaundice)    Negative: Fever present > 3 days (72 hours)    Negative: Receiving cancer chemotherapy medication    Protocols used: NAUSEA-A-AH

## 2019-06-24 NOTE — TELEPHONE ENCOUNTER
Patient has taken stool softners and enama for two weeks with little movement. What more can she do? It's been roughly 10 days since she had a decent bowel movement    Please call and ok to [facility] [phone]

## 2019-06-24 NOTE — TELEPHONE ENCOUNTER
Patient calling to see if Dr. Lincoln can please work her in today.  Patient reports she has had a period which she normal does not get and gums are bleeding since going on blood thinner.  The nausea is keeping her up at night and Zofran is not helping.  Patient states she is concerned she is bleeding internally.  Please advise.

## 2019-06-24 NOTE — PROGRESS NOTES
Subjective     Ana Laura Wharton is a 48 year old female who presents to clinic today for the following health issues:    HPI   Abdominal pain and nausea:    Presents with one week symptoms of abdominal pain, nausea, fatigue, decreased oral intake. No vomiting, fever. Has had constipation, irregular bowel movements. No blood in the stools. Able to eat. Taking Zofran , but not helping. Has h/o pancreatitis. No alcohol consumption.   Has h/o bipolar disorder, recent hospitalization for intentional drug over dose. Follows with psychiatry. Denies suicidal ideation. On treatment.   Has h/o HTN. on medical treatment. BP has been controlled. No side effects from medications. No CP, HA, dizziness. good compliance with medications and low salt diet.  Has h/o paroxysmal a fib. No recurrent symptoms.         Patient Active Problem List   Diagnosis     Depressed bipolar I disorder in full remission (H)     Anxiety disorder     Suicide ideation     Bipolar disorder current episode depressed (H)     Overdose     Depression     Mood disorder (H)     Suicidal ideations     Bipolar I disorder (H)     Acute renal failure (H)     Alternating exotropia     Anxiety state     Other bipolar disorders     Personality disorder (H)     Tear film insufficiency     Dysfunctions associated with sleep stages or arousal from sleep     Abnormal finding of blood chemistry     Esophageal reflux     Other specified hypothyroidism     Impulse control disorder     Acidosis     Low back pain     Myopia     Opioid dependence (H)     Orofacial dyskinesia     Intractable migraine     Pure hypercholesterolemia     Poisoning by 4-aminophenol derivatives, undetermined intent     Essential hypertension     Left knee pain     Aftercare following surgery of the musculoskeletal system     UTI (urinary tract infection)     Acute cystitis     Clostridium difficile colitis     Drug overdose     Calcium channel blocker overdose     Intentional acetaminophen overdose  (H)     Alcoholic intoxication with complication (H)     Hypotension due to medication     Acute renal failure, unspecified acute renal failure type (H)     Paroxysmal atrial fibrillation (H)     Acute respiratory failure with hypoxia (H)     Acute pulmonary edema (H)     Spontaneous pneumothorax     PRES (posterior reversible encephalopathy syndrome)     Pancreatitis     Atypical squamous cells cannot exclude high grade squamous intraepithelial lesion on cytologic smear of cervix (ASC-H)     Past Surgical History:   Procedure Laterality Date     ARTHROSCOPY KNEE      L knee     CERVIX SURGERY      for pre-cancerous changes     left knee surgery       ORTHOPEDIC SURGERY      L shoulder     THORACIC SURGERY      for pneumothorax, pleurodesis and lobe resection       Social History     Tobacco Use     Smoking status: Current Some Day Smoker     Types: Cigarettes     Smokeless tobacco: Never Used     Tobacco comment: quit 05/07/2019   Substance Use Topics     Alcohol use: No     Alcohol/week: 1.2 oz     Types: 2 Glasses of wine per week     Comment: Quit on 09/07/18     Family History   Problem Relation Age of Onset     Depression Mother      Lipids Father         hyperlipidemia     Macular Degeneration Father          Current Outpatient Medications   Medication Sig Dispense Refill     albuterol (PROAIR HFA/PROVENTIL HFA/VENTOLIN HFA) 108 (90 Base) MCG/ACT inhaler Inhale 2 puffs into the lungs every 6 hours as needed for shortness of breath / dyspnea or wheezing 1 Inhaler 0     amLODIPine (NORVASC) 10 MG tablet Take 1 tablet (10 mg) by mouth daily 90 tablet 3     apixaban ANTICOAGULANT (ELIQUIS) 5 MG tablet Take 1 tablet (5 mg) by mouth 2 times daily 120 tablet 0     atorvastatin (LIPITOR) 10 MG tablet Take 1 tablet (10 mg) by mouth daily 90 tablet 3     carvedilol (COREG) 12.5 MG tablet Take 1 tablet (12.5 mg) by mouth 2 times daily (with meals) 180 tablet 3     ciprofloxacin (CIPRO) 250 MG tablet Take 0.5 tablets  "(125 mg) by mouth daily For UTI prevention 45 tablet 3     citalopram (CELEXA) 20 MG tablet Take 1 tablet (20 mg) by mouth daily 30 tablet 3     clonazePAM (KLONOPIN) 1 MG tablet Take 2 tablets (2 mg) by mouth At Bedtime 1 tablet 0     cyclobenzaprine (FLEXERIL) 5 MG tablet Take 10 mg by mouth At Bedtime        eletriptan (RELPAX) 40 MG tablet Take 40 mg by mouth at onset of headache for migraine       FIBER PO Take 2 tablets by mouth daily       folic acid (FOLVITE) 400 MCG tablet Take 400 mcg by mouth daily       hydrOXYzine (ATARAX) 50 MG tablet Take 2 tablets (100 mg) by mouth 2 times daily as needed for anxiety 120 tablet 1     Levonorgestrel, [id number], (NORPLANT SYSTEM IL)        levothyroxine (SYNTHROID) 50 MCG tablet Take 1 tablet (50 mcg) by mouth daily 90 tablet 3     mirabegron (MYRBETRIQ) 25 MG 24 hr tablet Take 1 tablet (25 mg) by mouth daily 90 tablet 1     mirtazapine (REMERON) 30 MG tablet Take 2 tablets (60 mg) by mouth At Bedtime 180 tablet 1     Multiple Vitamins-Minerals (WOMENS MULTI VITAMIN & MINERAL PO) Take 1 tablet by mouth daily       OLANZapine (ZYPREXA) 10 MG tablet Take 2 tablets (20 mg) by mouth At Bedtime 1 tablet 0     ondansetron (ZOFRAN) 4 MG tablet Take 1 tablet (4 mg) by mouth every 8 hours as needed for nausea 30 tablet 0     pantoprazole (PROTONIX) 40 MG EC tablet Take 1 tablet (40 mg) by mouth every morning (before breakfast) 90 tablet 3     oxybutynin ER (DITROPAN-XL) 5 MG 24 hr tablet Take 1 tablet (5 mg) by mouth daily (Patient not taking: Reported on 6/24/2019) 90 tablet 3         Reviewed and updated as needed this visit by Provider         Review of Systems   ROS COMP: Constitutional, HEENT, cardiovascular, pulmonary, GI, , musculoskeletal, neuro, skin, endocrine and psych systems are negative, except as otherwise noted.      Objective    Ht 1.676 m (5' 6\")   BMI 18.16 kg/m    Body mass index is 18.16 kg/m .  Physical Exam   GENERAL:anxious, weak, underweight , alert " and no distress  EYES: Eyes grossly normal to inspection, PERRL and conjunctivae and sclerae normal  HENT: ear canals and TM's normal, nose and mouth without ulcers or lesions  NECK: no adenopathy, no asymmetry, masses, or scars and thyroid normal to palpation  RESP: lungs clear to auscultation - no rales, rhonchi or wheezes  CV: regular rate and rhythm, normal S1 S2, no S3 or S4, no murmur, click or rub, no peripheral edema and peripheral pulses strong  ABDOMEN: soft, diffusely tender, no guarding or rebound, no hepatosplenomegaly, no masses and bowel sounds normal  MS: no gross musculoskeletal defects noted, no edema    Diagnostic Test Results:  Labs reviewed in Epic  Results for orders placed or performed in visit on 06/24/19   XR Abdomen 2 Views    Narrative    ABDOMEN TWO VIEWS 6/24/2019 3:35 PM    HISTORY: 48-year-old woman with generalized abdominal pain.      Impression    IMPRESSION: Since May 14, 2019, there remains no intraperitoneal air.  No dilated air-filled loops of small bowel. Moderate amount of stool  noted throughout majority of the colon, clinically correlate with  constipation.    NICKI BRADEN MD   CBC with platelets   Result Value Ref Range    WBC 9.8 4.0 - 11.0 10e9/L    RBC Count 4.43 3.8 - 5.2 10e12/L    Hemoglobin 13.6 11.7 - 15.7 g/dL    Hematocrit 41.7 35.0 - 47.0 %    MCV 94 78 - 100 fl    MCH 30.7 26.5 - 33.0 pg    MCHC 32.6 31.5 - 36.5 g/dL    RDW 14.4 10.0 - 15.0 %    Platelet Count 375 150 - 450 10e9/L   Comprehensive metabolic panel   Result Value Ref Range    Sodium 138 133 - 144 mmol/L    Potassium 3.5 3.4 - 5.3 mmol/L    Chloride 103 94 - 109 mmol/L    Carbon Dioxide 28 20 - 32 mmol/L    Anion Gap 7 3 - 14 mmol/L    Glucose 103 (H) 70 - 99 mg/dL    Urea Nitrogen 11 7 - 30 mg/dL    Creatinine 0.64 0.52 - 1.04 mg/dL    GFR Estimate >90 >60 mL/min/[1.73_m2]    GFR Estimate If Black >90 >60 mL/min/[1.73_m2]    Calcium 9.0 8.5 - 10.1 mg/dL    Bilirubin Total 0.3 0.2 - 1.3 mg/dL     Albumin 4.2 3.4 - 5.0 g/dL    Protein Total 8.1 6.8 - 8.8 g/dL    Alkaline Phosphatase 105 40 - 150 U/L    ALT 24 0 - 50 U/L    AST 35 0 - 45 U/L   *UA reflex to Microscopic   Result Value Ref Range    Color Urine Yellow     Appearance Urine Clear     Glucose Urine Negative NEG^Negative mg/dL    Bilirubin Urine Negative NEG^Negative    Ketones Urine Negative NEG^Negative mg/dL    Specific Gravity Urine 1.015 1.003 - 1.035    Blood Urine Moderate (A) NEG^Negative    pH Urine 8.0 (H) 5.0 - 7.0 pH    Protein Albumin Urine Negative NEG^Negative mg/dL    Urobilinogen Urine 0.2 0.2 - 1.0 EU/dL    Nitrite Urine Negative NEG^Negative    Leukocyte Esterase Urine Negative NEG^Negative    Source Midstream Urine    Lipase   Result Value Ref Range    Lipase 129 73 - 393 U/L   Amylase   Result Value Ref Range    Amylase 35 30 - 110 U/L   Urine Microscopic   Result Value Ref Range    WBC Urine 0 - 5 OTO5^0 - 5 /HPF    RBC Urine O - 2 OTO2^O - 2 /HPF    Squamous Epithelial /LPF Urine Few FEW^Few /LPF    Bacteria Urine Few (A) NEG^Negative /HPF    Amorphous Crystals Few (A) NEG^Negative /HPF           Assessment & Plan   Problem List Items Addressed This Visit     Bipolar I disorder (H)    Essential hypertension    Paroxysmal atrial fibrillation (H)      Other Visit Diagnoses     Abdominal pain, generalized    -  Primary    Relevant Medications    ondansetron (ZOFRAN) 4 MG tablet    Other Relevant Orders    CBC with platelets (Completed)    Comprehensive metabolic panel (Completed)    *UA reflex to Microscopic (Completed)    Lipase (Completed)    Amylase (Completed)    XR Abdomen 2 Views (Completed)    Urine Microscopic (Completed)         Assess lab work and abdominal X rays  Normal results, no evidence of acute pancreatitis, bowel obstruction or free air  Try to manage constipation, start on senna, keep fluids, PRN antiemetics   Cont rest of medications.     Tobacco Cessation:   reports that she has been smoking cigarettes.  She  has never used smokeless tobacco.          See Patient Instructions  Return in about 4 weeks (around 7/22/2019) for Routine Visit, follow up on acute problem if persists.    Liborio Lincoln MD  [facility]

## 2019-06-24 NOTE — NURSING NOTE
"Vital signs:  Temp: 98.6  F (37  C) Temp src: Oral BP: 128/86 Pulse: 116   Resp: 16 SpO2: 99 %     Height: 167.6 cm (5' 6\") Weight: 50.8 kg (112 lb)  Estimated body mass index is 18.08 kg/m  as calculated from the following:    Height as of this encounter: 1.676 m (5' 6\").    Weight as of this encounter: 50.8 kg (112 lb).          "

## 2019-06-24 NOTE — TELEPHONE ENCOUNTER
Pt would like to be seen today for abdominal pain. Per [facility] ok to schedule.  Please call pt to advise.      Thank you,   Rodrick KENDALL   Central Scheduling  [phone]

## 2019-06-25 NOTE — TELEPHONE ENCOUNTER
Recommend to start on Senna - 2 tablets two times daily and Miralax one tsp with 10 oz water daily.   Her X ray does not show obstruction, also her lab work was normal.

## 2019-06-25 NOTE — TELEPHONE ENCOUNTER
Patient calls again and states she is desperate and needs to know immediately what she can do for her constipation.  Please advise.  Stool Softness and enema are giving her very small amount of relief.

## 2019-06-30 ENCOUNTER — NURSE TRIAGE (OUTPATIENT)
Dept: NURSING | Facility: CLINIC | Age: 49
End: 2019-06-30

## 2019-06-30 ENCOUNTER — TELEPHONE (OUTPATIENT)
Dept: INTERNAL MEDICINE | Facility: CLINIC | Age: 49
End: 2019-06-30

## 2019-06-30 NOTE — TELEPHONE ENCOUNTER
"Patient states she has bilateral \"leg swelling x 2-3 days.\"  States both legs swollen \"primarily below the knee and both ankles and feet.\"  States has been very active last few days.  States is on new medication in past 1 month for \"hypertension.\"  Denies pitting edema.  Describes legs as \"tender to touch.\"   Rates discomfort as a \"5\" on a 1-10 pain scale.  States it is becoming more difficult to walk.  Denies difficulty breathing.  Denies chest pain.  Denies redness to legs. No red streaks.  Afebrile by report.    Protocol- Leg Swelling & Edema- Adult   Care advice reviewed.   Disposition-  See Physician within 24 hours  Caller states understanding of the recommended disposition.  Transferred to  to make appointment with PCP.  Also reviewed [facility] resource with Caller per request.   Advised to call back if further questions or concerns.    FAVIO SimpsonN RN  [facility]      Reason for Disposition    [1] MODERATE leg swelling (e.g., swelling extends up to knees) AND [2] new onset or worsening    Additional Information    Negative: Severe difficulty breathing (e.g., struggling for each breath, speaks in single words)    Negative: Looks like a broken bone or dislocated joint (e.g., crooked or deformed)    Negative: Sounds like a life-threatening emergency to the triager    Negative: Difficulty breathing at rest    Negative: Entire foot is cool or blue in comparison to other side    Negative: [1] Can't walk or can barely walk AND [2] new onset    Negative: [1] Difficulty breathing with exertion (e.g., walking) AND [2] new onset or worsening    Negative: [1] Red area or streak AND [2] fever    Negative: [1] Swelling is painful to touch AND [2] fever    Negative: [1] Cast on leg or ankle AND [2] now increased pain    Negative: Patient sounds very sick or weak to the triager    Negative: SEVERE leg swelling (e.g., swelling extends above knee, entire leg is swollen, weeping fluid)    " Negative: [1] Red area or streak [2] large (> 2 in. or 5 cm)    Negative: [1] Thigh or calf pain AND [2] only 1 side AND [3] present > 1 hour    Negative: [1] Thigh, calf, or ankle swelling AND [2] only 1 side    Negative: [1] Thigh, calf, or ankle swelling AND [2] bilateral AND [3] 1 side is more swollen    Protocols used: LEG SWELLING AND EDEMA-A-AH

## 2019-06-30 NOTE — TELEPHONE ENCOUNTER
Reason for Call:  Other appointment    Detailed comments: patient is calling to request an appointment with Dr. Lincoln for leg swelling. Please follow up with patient.    Phone Number Patient can be reached at: Cell number on file:    Telephone Information:   Mobile [phone]       Best Time: anytime     Can we leave a detailed message on this number? YES    Call taken on 6/30/2019 at 2:27 PM by Jessika Jensen

## 2019-07-01 ENCOUNTER — OFFICE VISIT (OUTPATIENT)
Dept: INTERNAL MEDICINE | Facility: CLINIC | Age: 49
End: 2019-07-01
Payer: COMMERCIAL

## 2019-07-01 VITALS
SYSTOLIC BLOOD PRESSURE: 128 MMHG | HEIGHT: 66 IN | HEART RATE: 115 BPM | BODY MASS INDEX: 18.64 KG/M2 | TEMPERATURE: 98.3 F | OXYGEN SATURATION: 100 % | DIASTOLIC BLOOD PRESSURE: 82 MMHG | RESPIRATION RATE: 24 BRPM | WEIGHT: 116 LBS

## 2019-07-01 DIAGNOSIS — I10 ESSENTIAL HYPERTENSION: ICD-10-CM

## 2019-07-01 DIAGNOSIS — E78.00 PURE HYPERCHOLESTEROLEMIA: Primary | ICD-10-CM

## 2019-07-01 DIAGNOSIS — F31.76 DEPRESSED BIPOLAR I DISORDER IN FULL REMISSION (H): ICD-10-CM

## 2019-07-01 DIAGNOSIS — K59.00 CONSTIPATION, UNSPECIFIED CONSTIPATION TYPE: ICD-10-CM

## 2019-07-01 DIAGNOSIS — K13.0 CHEILOSIS: ICD-10-CM

## 2019-07-01 PROCEDURE — 99214 OFFICE O/P EST MOD 30 MIN: CPT | Performed by: INTERNAL MEDICINE

## 2019-07-01 PROCEDURE — 99207 C PAF COMPLETED  NO CHARGE: CPT | Performed by: INTERNAL MEDICINE

## 2019-07-01 PROCEDURE — 80061 LIPID PANEL: CPT | Performed by: INTERNAL MEDICINE

## 2019-07-01 PROCEDURE — 36415 COLL VENOUS BLD VENIPUNCTURE: CPT | Performed by: INTERNAL MEDICINE

## 2019-07-01 RX ORDER — SENNOSIDES A AND B 8.6 MG/1
1 TABLET, FILM COATED ORAL 2 TIMES DAILY PRN
Qty: 60 TABLET | Refills: 3 | COMMUNITY
Start: 2019-07-01 | End: 2020-11-28

## 2019-07-01 ASSESSMENT — MIFFLIN-ST. JEOR: SCORE: 1172.92

## 2019-07-01 NOTE — PROGRESS NOTES
Subjective     Ana Laura Wharton is a 48 year old female who presents to clinic today for the following health issues:      Follow up:    Patient has chronic constipations, chronic abdominal pain, nausea. Has h/o pancreatitis. Improved symptoms. Still has constipation with BM once a week. Mild feeling of bloating, no nausea, vomiting , no blood in the stools.   Has h/o depression. On medical treatment,  no side effects. Has chronic depressive symptoms , recent hospitalization for drug over dose, no current suicidal ideation.  Has H/O hyperlipidemia. On medical treatment and diet. No side effects. No muscle weakness, myalgias or upset stomach.   Has h/o HTN. on medical treatment. BP has been controlled. No side effects from medications. No CP, HA, dizziness. good compliance with medications and low salt diet.    Has sores in the angles of the mouth. Takes MVI.       Patient Active Problem List   Diagnosis     Depressed bipolar I disorder in full remission (H)     Anxiety disorder     Suicide ideation     Bipolar disorder current episode depressed (H)     Overdose     Depression     Mood disorder (H)     Suicidal ideations     Bipolar I disorder (H)     Acute renal failure (H)     Alternating exotropia     Anxiety state     Other bipolar disorders     Personality disorder (H)     Tear film insufficiency     Dysfunctions associated with sleep stages or arousal from sleep     Abnormal finding of blood chemistry     Esophageal reflux     Other specified hypothyroidism     Impulse control disorder     Acidosis     Low back pain     Myopia     Opioid dependence (H)     Orofacial dyskinesia     Intractable migraine     Pure hypercholesterolemia     Poisoning by 4-aminophenol derivatives, undetermined intent     Essential hypertension     Left knee pain     Aftercare following surgery of the musculoskeletal system     UTI (urinary tract infection)     Acute cystitis     Clostridium difficile colitis     Drug overdose      Calcium channel blocker overdose     Intentional acetaminophen overdose (H)     Alcoholic intoxication with complication (H)     Hypotension due to medication     Acute renal failure, unspecified acute renal failure type (H)     Paroxysmal atrial fibrillation (H)     Acute respiratory failure with hypoxia (H)     Acute pulmonary edema (H)     Spontaneous pneumothorax     PRES (posterior reversible encephalopathy syndrome)     Pancreatitis     Atypical squamous cells cannot exclude high grade squamous intraepithelial lesion on cytologic smear of cervix (ASC-H)     Past Surgical History:   Procedure Laterality Date     ARTHROSCOPY KNEE      L knee     CERVIX SURGERY      for pre-cancerous changes     left knee surgery       ORTHOPEDIC SURGERY      L shoulder     THORACIC SURGERY      for pneumothorax, pleurodesis and lobe resection       Social History     Tobacco Use     Smoking status: Current Some Day Smoker     Types: Cigarettes     Smokeless tobacco: Never Used     Tobacco comment: quit 05/07/2019   Substance Use Topics     Alcohol use: No     Alcohol/week: 1.2 oz     Types: 2 Glasses of wine per week     Comment: Quit on 09/07/18     Family History   Problem Relation Age of Onset     Depression Mother      Lipids Father         hyperlipidemia     Macular Degeneration Father          Current Outpatient Medications   Medication Sig Dispense Refill     albuterol (PROAIR HFA/PROVENTIL HFA/VENTOLIN HFA) 108 (90 Base) MCG/ACT inhaler Inhale 2 puffs into the lungs every 6 hours as needed for shortness of breath / dyspnea or wheezing 1 Inhaler 0     amLODIPine (NORVASC) 10 MG tablet Take 1 tablet (10 mg) by mouth daily 90 tablet 3     apixaban ANTICOAGULANT (ELIQUIS) 5 MG tablet Take 1 tablet (5 mg) by mouth 2 times daily 120 tablet 0     atorvastatin (LIPITOR) 10 MG tablet Take 1 tablet (10 mg) by mouth daily 90 tablet 3     carvedilol (COREG) 12.5 MG tablet Take 1 tablet (12.5 mg) by mouth 2 times daily (with meals)  180 tablet 3     ciprofloxacin (CIPRO) 250 MG tablet Take 0.5 tablets (125 mg) by mouth daily For UTI prevention 45 tablet 3     citalopram (CELEXA) 20 MG tablet Take 1 tablet (20 mg) by mouth daily 30 tablet 3     clonazePAM (KLONOPIN) 1 MG tablet Take 2 tablets (2 mg) by mouth At Bedtime 1 tablet 0     cyclobenzaprine (FLEXERIL) 5 MG tablet Take 10 mg by mouth At Bedtime        eletriptan (RELPAX) 40 MG tablet Take 40 mg by mouth at onset of headache for migraine       FIBER PO Take 2 tablets by mouth daily       folic acid (FOLVITE) 400 MCG tablet Take 400 mcg by mouth daily       hydrOXYzine (ATARAX) 50 MG tablet Take 2 tablets (100 mg) by mouth 2 times daily as needed for anxiety 120 tablet 1     Levonorgestrel, [id number], (NORPLANT SYSTEM [place])        levothyroxine (SYNTHROID) 50 MCG tablet Take 1 tablet (50 mcg) by mouth daily 90 tablet 3     mirabegron (MYRBETRIQ) 25 MG 24 hr tablet Take 1 tablet (25 mg) by mouth daily 90 tablet 1     mirtazapine (REMERON) 30 MG tablet Take 2 tablets (60 mg) by mouth At Bedtime 180 tablet 1     Multiple Vitamins-Minerals (WOMENS MULTI VITAMIN & MINERAL PO) Take 1 tablet by mouth daily       OLANZapine (ZYPREXA) 10 MG tablet Take 2 tablets (20 mg) by mouth At Bedtime 1 tablet 0     ondansetron (ZOFRAN) 4 MG tablet Take 1 tablet (4 mg) by mouth every 8 hours as needed for nausea 30 tablet 0     oxybutynin ER (DITROPAN-XL) 5 MG 24 hr tablet Take 1 tablet (5 mg) by mouth daily 90 tablet 3     pantoprazole (PROTONIX) 40 MG EC tablet Take 1 tablet (40 mg) by mouth every morning (before breakfast) 90 tablet 3     senna (SENOKOT) 8.6 MG tablet Take 1 tablet by mouth 2 times daily as needed for constipation 60 tablet 3         Reviewed and updated as needed this visit by Provider         Review of Systems   ROS COMP: Constitutional, HEENT, cardiovascular, pulmonary, GI, , musculoskeletal, neuro, skin, endocrine and psych systems are negative, except as otherwise noted.     "  Objective    Ht 1.676 m (5' 6\")   BMI 18.08 kg/m    Body mass index is 18.08 kg/m .  Physical Exam   GENERAL: healthy, alert and no distress  EYES: Eyes grossly normal to inspection, PERRL and conjunctivae and sclerae normal  NECK: no adenopathy, no asymmetry, masses, or scars and thyroid normal to palpation  RESP: lungs clear to auscultation - no rales, rhonchi or wheezes  CV: regular rate and rhythm, normal S1 S2, no S3 or S4, no murmur, click or rub, no peripheral edema and peripheral pulses strong  ABDOMEN: soft, nontender, no hepatosplenomegaly, no masses and bowel sounds normal  MS: no gross musculoskeletal defects noted, no edema  Cheilosis of the mouth angles     Diagnostic Test Results:  Labs reviewed in Epic        Assessment & Plan   Problem List Items Addressed This Visit     Depressed bipolar I disorder in full remission (H)    Pure hypercholesterolemia - Primary    Relevant Orders    Lipid panel reflex to direct LDL Fasting (Completed)    Essential hypertension      Other Visit Diagnoses     Constipation, unspecified constipation type        Relevant Medications    senna (SENOKOT) 8.6 MG tablet    Cheilosis                 Tobacco Cessation:   reports that she has been smoking cigarettes.  She has never used smokeless tobacco.    Assess lab  Cont treatment   Start Senna         See Patient Instructions  Return in about 3 months (around 10/1/2019) for Routine Visit.    Liborio Lincoln MD  [facility]        "

## 2019-07-01 NOTE — LETTER
July 5, 2019      Ana Laura Wharton  [address]   [place] [facility] [facility] [address]        Dear ,    Your lab results are within acceptable limits except for high triglycerides. Recommendations are to go on a low cholesterol diet and exercise regularly.     Resulted Orders   Lipid panel reflex to direct LDL Fasting   Result Value Ref Range    Cholesterol 116 <200 mg/dL    Triglycerides 327 (H) <150 mg/dL      Comment:      Borderline high:  150-199 mg/dl  High:             200-499 mg/dl  Very high:       >499 mg/dl  Fasting specimen      HDL Cholesterol 52 >49 mg/dL    LDL Cholesterol Calculated <1 <100 mg/dL      Comment:      Desirable:       <100 mg/dl    Non HDL Cholesterol 64 <130 mg/dL       If you have any questions or concerns, please call the clinic at the number listed above.       Sincerely,        Liborio Lincoln MD

## 2019-07-01 NOTE — NURSING NOTE
"Vital signs:  Temp: 98.3  F (36.8  C) Temp src: Oral BP: 128/82 Pulse: 115   Resp: 24 SpO2: 100 %     Height: 167.6 cm (5' 6\") Weight: 52.6 kg (116 lb)  Estimated body mass index is 18.72 kg/m  as calculated from the following:    Height as of this encounter: 1.676 m (5' 6\").    Weight as of this encounter: 52.6 kg (116 lb).          "

## 2019-07-02 LAB
CHOLEST SERPL-MCNC: 116 MG/DL
HDLC SERPL-MCNC: 52 MG/DL
LDLC SERPL CALC-MCNC: <1 MG/DL
NONHDLC SERPL-MCNC: 64 MG/DL
TRIGL SERPL-MCNC: 327 MG/DL

## 2019-07-03 NOTE — PROGRESS NOTES
Service Date: 06/06/2019      PSYCHIATRY CLINIC PROGRESS NOTE      The patient returns for medication management and supportive therapy.      Interim History:      Since the last visit, first follow up after serious OD. Was seen in hospital by Sly Mosley MD. Feeling much better now. Attending [facility] group.      RECENT SYMPTOMS:      Depression:    Suicidal ideation: no   depressed mood: no   anhedonia: no   low energy: a little bit   insomnia: sleep is broken with initial insomnia   appetite change: poor, but improving   excessive guilt: no   feeling worthless: no      Elevated Mood:  no manic symptoms      Psychosis:  no      Panic Attacks:  not since hospitalization      Anxiety:  better   excessive worry: no   tense: in neck and shoulders       Trauma-Related Symptoms:  no      ADVERSE EFFECTS:  dry mouth      MEDICAL CONCERNS:  on blood thinners for clot in neck where central line was placed.      SUBSTANCE USE:     Alcohol: none since OD   Tobacco: quit   Opioids: no   Cannabis: no   Other illicit drugs: drugs      SOCIAL/FAMILY HISTORY:  unchanged. Seeing more friends.      MEDICAL/SURGICAL HISTORY:  See EMR medical problems list.      MEDICAL REVIEW OF SYSTEMS:  A comprehensive review of systems was performed and is negative other than as noted in the HPI.      ALLERGIES:  none new      CURRENT MEDICATIONS:  See EMR med sections.      VITALS:  See rooming note.      MENTAL STATUS EXAM:     Alertness: yes   Appearance: well groomed   Behavior/Demeanor: cooperative   Speech: nl rate and flow   Psychomotor: nl   Mood: reactive   Affect: congruent   Thought Process/Associations: logical   Thought content: no SI   Perception: no psychosis    Insight: better   Judgement: better   Cognition:   Oriented: x4   Attention span: adequate   Concentration: adequate   Recent memory: mostly intact except surrounding OD   Remote memory: mostly intact except surrounding OD   Fund of knowledge: good   Gait and station: nl       DIAGNOSIS AND ASSESSMENT:  Bipolar I, most recent depressed alcohol use disorder      PLAN:   1. Medications:  no changes. Zyprexa 20 every bedtime. Klonopin 2mg every bedtime only.   2. Therapy:  individual    3. RTC:  2-3 wk.   4. Crisis numbers:  Provided routinely in AVS.      Treatment Risk Statement: The patient understands the risks, benefits, adverse effects, and alternatives. Agrees to treatment with the capacity to do so. No medical contraindications to treatment. Agrees to call clinic for any problems. The patient understands to call 911 or come to the nearest ED is life-threatening or urgent symptoms present.         PIEDAD SADLER MD             D: 2019   T: 2019   MT: CAITLYN      Name:     KEE TINEO   MRN:      [id number]        Account:      [id number]   :      1970           Service Date: 2019      Document: [id number]

## 2019-07-16 ENCOUNTER — TELEPHONE (OUTPATIENT)
Dept: PSYCHIATRY | Facility: CLINIC | Age: 49
End: 2019-07-16

## 2019-07-16 DIAGNOSIS — F31.9 BIPOLAR I DISORDER (H): ICD-10-CM

## 2019-07-16 RX ORDER — OLANZAPINE 20 MG/1
20 TABLET ORAL AT BEDTIME
Qty: 90 TABLET | Refills: 0 | Status: SHIPPED | OUTPATIENT
Start: 2019-07-16 | End: 2019-08-16

## 2019-07-16 RX ORDER — CYCLOBENZAPRINE HCL 10 MG
TABLET ORAL
Qty: 90 TABLET | Refills: 0 | OUTPATIENT
Start: 2019-07-16

## 2019-07-16 NOTE — TELEPHONE ENCOUNTER
"Hattie Hare, RN  Hattie Hare, RN   Phone Number: [phone]          Previous Messages      ----- Message -----   From: Wesly Escoto   Sent: 7/16/2019  11:20 AM   To: [facility]   Subject: Dr. Rodriguez refill request                       Caller:  self   Relationship to pt: self   Medication:   OLANZapine (ZYPREXA) 10 MG tablet   How many days left of med do you have left (if >3 day supply, redirect to pharmacy): \"several\"   Pharmacy and location: [facility] [address]   Pending appt date:  9/26/19   Okay to leave detailed VM:  yes        "

## 2019-07-16 NOTE — TELEPHONE ENCOUNTER
Last seen: 6/6/19  RTC: 2-3 weeks  Cancel: 7/5/19 (illness)  No-show: none  Next appt: 9/26/19     Medication requested: OLANZapine (ZYPREXA) 10 MG tablet  Directions: Take 2 tablets (20 mg) by mouth At Bedtime  Qty: 60     Routed to provider due to one cancellation

## 2019-07-16 NOTE — TELEPHONE ENCOUNTER
Jaden Rodriguez MD Labossiere, Laura, RN   Caller: Unspecified (Today, 11:34 AM)             OK to fill.  I would like for her to come in sooner.      Writer reordered as one 20 mg tab per pt's preference. This was also confirmed in provider's note from 6/6. She agreed to see provider sooner and requested an afternoon appt around noon or 1 pm. Message routed to provider for available openings.

## 2019-07-17 ENCOUNTER — DOCUMENTATION ONLY (OUTPATIENT)
Dept: OTHER | Facility: CLINIC | Age: 49
End: 2019-07-17

## 2019-07-17 ENCOUNTER — AMBULATORY - HEALTHEAST (OUTPATIENT)
Dept: OTHER | Facility: CLINIC | Age: 49
End: 2019-07-17

## 2019-07-18 NOTE — TELEPHONE ENCOUNTER
Writer received incoming phone call from the pt. She said the pharmacy informed her that the Flexeril refill was denied, as the medication was discontinued. The pt said she is still taking the medication. Writer agreed to connect with the provider and then call her back with an update.     Last seen: 6/6/19  RTC: 2-3 weeks  Cancel: none  No-show: none  Next appt: 9/26/19     Medication requested: cyclobenzaprine (FLEXERIL)10 MG tablet  Directions: take one tablet at bedtime  Qty: 90     Routed to provider to confirm if provider is still prescribing medication

## 2019-07-23 ENCOUNTER — TELEPHONE (OUTPATIENT)
Dept: INTERNAL MEDICINE | Facility: CLINIC | Age: 49
End: 2019-07-23

## 2019-07-23 NOTE — TELEPHONE ENCOUNTER
Advised to send Evisit via [employer], she agrees.     She has a really strong urine odor. Had this before.    Will watch for evisit and make sure this is forwarded.

## 2019-07-23 NOTE — TELEPHONE ENCOUNTER
Patient calling and requesting a lab order be put in for a possible UTI. Patient states she has been seen a lot lately and would rather just have a urine test done instead of a office visit  Ok to call and  [phone]

## 2019-07-24 ENCOUNTER — MYC MEDICAL ADVICE (OUTPATIENT)
Dept: INTERNAL MEDICINE | Facility: CLINIC | Age: 49
End: 2019-07-24

## 2019-07-24 DIAGNOSIS — R82.90 NONSPECIFIC FINDING ON EXAMINATION OF URINE: Primary | ICD-10-CM

## 2019-07-24 DIAGNOSIS — R30.0 DYSURIA: Primary | ICD-10-CM

## 2019-07-24 NOTE — TELEPHONE ENCOUNTER
Pt was instructed yesterday to do Evisit, but not clear if she doesn't know how, or thinks MyChart is E-visit?     See her Glyde message. Please advise.

## 2019-07-24 NOTE — TELEPHONE ENCOUNTER
Pt calling back checking on status of below. She stated she could not figure out how to do an E-visit. She would like to come in and leave a specimen if possible. Pt can be reached at [phone]. OK to leave a detailed message. Please advise. Thanks.

## 2019-07-25 ENCOUNTER — TELEPHONE (OUTPATIENT)
Dept: INTERNAL MEDICINE | Facility: CLINIC | Age: 49
End: 2019-07-25

## 2019-07-25 DIAGNOSIS — R30.0 DYSURIA: ICD-10-CM

## 2019-07-25 DIAGNOSIS — R31.9 URINARY TRACT INFECTION WITH HEMATURIA, SITE UNSPECIFIED: Primary | ICD-10-CM

## 2019-07-25 DIAGNOSIS — N39.0 URINARY TRACT INFECTION WITH HEMATURIA, SITE UNSPECIFIED: Primary | ICD-10-CM

## 2019-07-25 LAB
ALBUMIN UR-MCNC: 30 MG/DL
AMORPH CRY #/AREA URNS HPF: ABNORMAL /HPF
APPEARANCE UR: CLEAR
BACTERIA #/AREA URNS HPF: ABNORMAL /HPF
BILIRUB UR QL STRIP: NEGATIVE
COLOR UR AUTO: YELLOW
GLUCOSE UR STRIP-MCNC: NEGATIVE MG/DL
HGB UR QL STRIP: ABNORMAL
KETONES UR STRIP-MCNC: NEGATIVE MG/DL
LEUKOCYTE ESTERASE UR QL STRIP: ABNORMAL
NITRATE UR QL: POSITIVE
NON-SQ EPI CELLS #/AREA URNS LPF: ABNORMAL /LPF
PH UR STRIP: 8.5 PH (ref 5–7)
RBC #/AREA URNS AUTO: ABNORMAL /HPF
SOURCE: ABNORMAL
SP GR UR STRIP: 1.01 (ref 1–1.03)
UROBILINOGEN UR STRIP-ACNC: 0.2 EU/DL (ref 0.2–1)
WBC #/AREA URNS AUTO: ABNORMAL /HPF

## 2019-07-25 PROCEDURE — 87088 URINE BACTERIA CULTURE: CPT | Performed by: INTERNAL MEDICINE

## 2019-07-25 PROCEDURE — 81001 URINALYSIS AUTO W/SCOPE: CPT | Performed by: INTERNAL MEDICINE

## 2019-07-25 PROCEDURE — 87186 SC STD MICRODIL/AGAR DIL: CPT | Performed by: INTERNAL MEDICINE

## 2019-07-25 PROCEDURE — 87086 URINE CULTURE/COLONY COUNT: CPT | Performed by: INTERNAL MEDICINE

## 2019-07-25 RX ORDER — CIPROFLOXACIN 250 MG/1
250 TABLET, FILM COATED ORAL 2 TIMES DAILY
Qty: 6 TABLET | Refills: 0 | Status: SHIPPED | OUTPATIENT
Start: 2019-07-25 | End: 2019-10-30

## 2019-07-25 NOTE — TELEPHONE ENCOUNTER
Patient wanting a call back for lab results    Ok to call and Lm [phone]    Pharmacy is [facility] in [place] [address]. Please give patient a generic medication

## 2019-07-25 NOTE — TELEPHONE ENCOUNTER
Call to patient and advised of providers message below, patient stated that she would like a call back as soon as results come back for culture for appropriate treatment.

## 2019-07-25 NOTE — TELEPHONE ENCOUNTER
RN--please contact patient.   Urine test suggests a bladder infection.   Belen Gao has sent an antibiotic prescription to her pharmacy.

## 2019-07-25 NOTE — TELEPHONE ENCOUNTER
Hattie Hare, RN  Hattie Hare, RN   Phone Number: [phone]          Previous Messages      ----- Message -----   From: Danielle Danielle   Sent: 7/25/2019   9:18 AM   To: [facility]   Subject: Refill Request                                   Caller: Ana Laura   Medication: Cyclobenzaprine 10 mg taken 1 at bedtime   Pharmacy and location:  [facility] mail order   Have you contacted the pharmacy: Yes   How much of medication do you have left:  None   Pending appt: Yes   Okay to leave VM:  Yes     Thanks!        Routed to provider, as writer is uncertain if he is still prescribing this medication.

## 2019-07-25 NOTE — TELEPHONE ENCOUNTER
Call to pt and advised.     She states she has her own Cipro, for prevention. She already Increased to 250 mg BID x 1 week ago. Still has symptoms. It is not helping.     She is asking if can have RX for Macrobid instead.?

## 2019-07-25 NOTE — TELEPHONE ENCOUNTER
She had a urine done which showed 10-25 white cells, culture is in progress.  I think it would be best to wait for the culture results to come back so we can determine whether there is significant amount of bacteria in the first place and then what the appropriate antibiotic would be to treat it.  In the meantime, she can take over-the-counter Azo to help with the symptoms.  If she has any symptoms of yeast such as itching, irritation or vaginal discharge could consider using over-the-counter yeast treatment as sometimes that can cause dysuria.

## 2019-07-26 NOTE — TELEPHONE ENCOUNTER
Patient called Re: urin culture , informed It is not Final as of now and to check back later     KEN Woods LPN

## 2019-07-27 ENCOUNTER — NURSE TRIAGE (OUTPATIENT)
Dept: NURSING | Facility: CLINIC | Age: 49
End: 2019-07-27

## 2019-07-27 NOTE — TELEPHONE ENCOUNTER
Patient says she was seen last week for UTI and is still waiting for the culture results.  Patient reports on-going burning, frequency and cloudy foul smelling urine.  FNA advised results for susceptibility is not back yet.  FNA advised to check back tomorrow.  Caller verbalizes understanding.    Reason for Disposition    Caller requesting lab results    Additional Information    Negative: Lab calling with strep throat test results and triager can call in prescription    Negative: Lab calling with urinalysis test results and triager can call in prescription    Negative: Medication questions    Negative: ED call to PCP    Negative: Physician call to PCP    Negative: Call about patient who is currently hospitalized    Negative: Lab or radiology calling with CRITICAL test results    Negative: [1] Prescription not at pharmacy AND [2] was prescribed today by PCP    Negative: [1] Follow-up call from patient regarding patient's clinical status AND [2] information urgent    Negative: [1] Caller requests to speak ONLY to PCP AND [2] URGENT question    Negative: [1] Caller requests to speak to PCP now AND [2] won't tell us reason for call  (Exception: if 10 pm to 6 am, caller must first discuss reason for the call)    Negative: Notification of hospital admission    Negative: Notification of death    Protocols used: PCP CALL - NO TRIAGE-A-

## 2019-07-28 ENCOUNTER — HOSPITAL ENCOUNTER (EMERGENCY)
Facility: CLINIC | Age: 49
Discharge: HOME | End: 2019-07-28
Attending: EMERGENCY MEDICINE | Admitting: EMERGENCY MEDICINE
Payer: COMMERCIAL

## 2019-07-28 ENCOUNTER — NURSE TRIAGE (OUTPATIENT)
Dept: NURSING | Facility: CLINIC | Age: 49
End: 2019-07-28

## 2019-07-28 VITALS
HEART RATE: 98 BPM | TEMPERATURE: 98.6 F | OXYGEN SATURATION: 98 % | DIASTOLIC BLOOD PRESSURE: 94 MMHG | WEIGHT: 116 LBS | SYSTOLIC BLOOD PRESSURE: 137 MMHG | BODY MASS INDEX: 18.72 KG/M2 | RESPIRATION RATE: 18 BRPM

## 2019-07-28 DIAGNOSIS — N39.0 COMPLICATED UTI (URINARY TRACT INFECTION): ICD-10-CM

## 2019-07-28 LAB
ANION GAP SERPL CALCULATED.3IONS-SCNC: 5 MMOL/L (ref 3–14)
BACTERIA SPEC CULT: ABNORMAL
BASOPHILS # BLD AUTO: 0.1 10E9/L (ref 0–0.2)
BASOPHILS NFR BLD AUTO: 1.2 %
BUN SERPL-MCNC: 12 MG/DL (ref 7–30)
CALCIUM SERPL-MCNC: 7.8 MG/DL (ref 8.5–10.1)
CHLORIDE SERPL-SCNC: 109 MMOL/L (ref 94–109)
CO2 SERPL-SCNC: 26 MMOL/L (ref 20–32)
CREAT SERPL-MCNC: 0.51 MG/DL (ref 0.52–1.04)
DIFFERENTIAL METHOD BLD: NORMAL
EOSINOPHIL # BLD AUTO: 0.2 10E9/L (ref 0–0.7)
EOSINOPHIL NFR BLD AUTO: 2.6 %
ERYTHROCYTE [DISTWIDTH] IN BLOOD BY AUTOMATED COUNT: 14.4 % (ref 10–15)
GFR SERPL CREATININE-BSD FRML MDRD: >90 ML/MIN/{1.73_M2}
GLUCOSE SERPL-MCNC: 97 MG/DL (ref 70–99)
HCT VFR BLD AUTO: 35.9 % (ref 35–47)
HGB BLD-MCNC: 12.3 G/DL (ref 11.7–15.7)
IMM GRANULOCYTES # BLD: 0.1 10E9/L (ref 0–0.4)
IMM GRANULOCYTES NFR BLD: 0.8 %
LACTATE BLD-SCNC: 1.7 MMOL/L (ref 0.7–2)
LYMPHOCYTES # BLD AUTO: 1.8 10E9/L (ref 0.8–5.3)
LYMPHOCYTES NFR BLD AUTO: 21.4 %
MCH RBC QN AUTO: 31.9 PG (ref 26.5–33)
MCHC RBC AUTO-ENTMCNC: 34.3 G/DL (ref 31.5–36.5)
MCV RBC AUTO: 93 FL (ref 78–100)
MONOCYTES # BLD AUTO: 1.2 10E9/L (ref 0–1.3)
MONOCYTES NFR BLD AUTO: 14 %
NEUTROPHILS # BLD AUTO: 5.2 10E9/L (ref 1.6–8.3)
NEUTROPHILS NFR BLD AUTO: 60 %
NRBC # BLD AUTO: 0 10*3/UL
NRBC BLD AUTO-RTO: 0 /100
PLATELET # BLD AUTO: 212 10E9/L (ref 150–450)
POTASSIUM SERPL-SCNC: 3.5 MMOL/L (ref 3.4–5.3)
RBC # BLD AUTO: 3.86 10E12/L (ref 3.8–5.2)
SODIUM SERPL-SCNC: 140 MMOL/L (ref 133–144)
SPECIMEN SOURCE: ABNORMAL
WBC # BLD AUTO: 8.6 10E9/L (ref 4–11)

## 2019-07-28 PROCEDURE — 83605 ASSAY OF LACTIC ACID: CPT | Performed by: EMERGENCY MEDICINE

## 2019-07-28 PROCEDURE — 80048 BASIC METABOLIC PNL TOTAL CA: CPT | Performed by: EMERGENCY MEDICINE

## 2019-07-28 PROCEDURE — 96365 THER/PROPH/DIAG IV INF INIT: CPT

## 2019-07-28 PROCEDURE — 99285 EMERGENCY DEPT VISIT HI MDM: CPT | Mod: 25

## 2019-07-28 PROCEDURE — 85025 COMPLETE CBC W/AUTO DIFF WBC: CPT | Performed by: EMERGENCY MEDICINE

## 2019-07-28 PROCEDURE — 25000128 H RX IP 250 OP 636: Performed by: EMERGENCY MEDICINE

## 2019-07-28 PROCEDURE — 96375 TX/PRO/DX INJ NEW DRUG ADDON: CPT

## 2019-07-28 PROCEDURE — 25000132 ZZH RX MED GY IP 250 OP 250 PS 637: Performed by: EMERGENCY MEDICINE

## 2019-07-28 PROCEDURE — 96361 HYDRATE IV INFUSION ADD-ON: CPT

## 2019-07-28 PROCEDURE — 96376 TX/PRO/DX INJ SAME DRUG ADON: CPT

## 2019-07-28 RX ORDER — MORPHINE SULFATE 4 MG/ML
2 INJECTION, SOLUTION INTRAMUSCULAR; INTRAVENOUS ONCE
Status: COMPLETED | OUTPATIENT
Start: 2019-07-28 | End: 2019-07-28

## 2019-07-28 RX ORDER — HYDROCODONE BITARTRATE AND ACETAMINOPHEN 5; 325 MG/1; MG/1
1 TABLET ORAL EVERY 6 HOURS PRN
Qty: 5 TABLET | Refills: 0 | Status: SHIPPED | OUTPATIENT
Start: 2019-07-28 | End: 2019-10-30

## 2019-07-28 RX ORDER — CEFPODOXIME PROXETIL 200 MG/1
200 TABLET, FILM COATED ORAL 2 TIMES DAILY
Qty: 20 TABLET | Refills: 0 | Status: SHIPPED | OUTPATIENT
Start: 2019-07-28 | End: 2019-10-30

## 2019-07-28 RX ORDER — MORPHINE SULFATE 4 MG/ML
4 INJECTION, SOLUTION INTRAMUSCULAR; INTRAVENOUS
Status: COMPLETED | OUTPATIENT
Start: 2019-07-28 | End: 2019-07-28

## 2019-07-28 RX ORDER — CYCLOBENZAPRINE HCL 10 MG
TABLET ORAL
Qty: 90 TABLET | Refills: 0 | OUTPATIENT
Start: 2019-07-28

## 2019-07-28 RX ORDER — DIPHENHYDRAMINE HYDROCHLORIDE 50 MG/ML
25 INJECTION INTRAMUSCULAR; INTRAVENOUS ONCE
Status: COMPLETED | OUTPATIENT
Start: 2019-07-28 | End: 2019-07-28

## 2019-07-28 RX ORDER — CEFTRIAXONE 1 G/1
1 INJECTION, POWDER, FOR SOLUTION INTRAMUSCULAR; INTRAVENOUS ONCE
Status: COMPLETED | OUTPATIENT
Start: 2019-07-28 | End: 2019-07-28

## 2019-07-28 RX ORDER — ACETAMINOPHEN 325 MG/1
650 TABLET ORAL ONCE
Status: COMPLETED | OUTPATIENT
Start: 2019-07-28 | End: 2019-07-28

## 2019-07-28 RX ADMIN — MORPHINE SULFATE 2 MG: 4 INJECTION INTRAVENOUS at 07:24

## 2019-07-28 RX ADMIN — CEFTRIAXONE SODIUM 1 G: 1 INJECTION, POWDER, FOR SOLUTION INTRAMUSCULAR; INTRAVENOUS at 07:43

## 2019-07-28 RX ADMIN — MORPHINE SULFATE 4 MG: 4 INJECTION INTRAVENOUS at 08:19

## 2019-07-28 RX ADMIN — ACETAMINOPHEN 650 MG: 325 TABLET, FILM COATED ORAL at 07:23

## 2019-07-28 RX ADMIN — DIPHENHYDRAMINE HYDROCHLORIDE 25 MG: 50 INJECTION, SOLUTION INTRAMUSCULAR; INTRAVENOUS at 07:23

## 2019-07-28 RX ADMIN — SODIUM CHLORIDE 1000 ML: 9 INJECTION, SOLUTION INTRAVENOUS at 07:04

## 2019-07-28 ASSESSMENT — ENCOUNTER SYMPTOMS
NAUSEA: 0
DYSURIA: 1
VOMITING: 0

## 2019-07-28 NOTE — ED PROVIDER NOTES
History     Chief Complaint:  UTI    The history is provided by the patient.      Ana Laura Wharton is a 48 year old female with a atrial fibrillation on Eliquis and history of UTI on prophylactic ciprofloxacin who presents for evaluation of a recurrent UTI. For 8 days Ana Laura has had dysuria, foul-smelling urine, frequency, and general pelvic discomfort. She saw her primary care provider and had UA and urine culture 7/25/2019. She has had no improvement in symptoms so she called for results of her culture. She was told there was resistance and, given her multiple medication intolerances/allergies, was sent to the ER. She reports the aforementioned symptoms and requests pain management, but reports pyridium has not been helpful in the past. She denies fever, nausea, vomiting, and history of kidney stones or pylonephritis.     Urine Culture 7/25/2019  Providencia (proteus) rettgeri >100,000 colonies/mL  Antibiotic Interpretation Method Status    AMPICILLIN Resistant TAMMIE Final     Intrinsically Resistant   AMPICILLIN/SULBACTAM Sensitive TAMMIE Final    CEFAZOLIN Resistant TAMMIE Final     Cefazolin TAMMIE breakpoints are for the treatment of uncomplicated urinary tract   infections.  For the treatment of systemic infections, please contact the   laboratory for additional testing.  Intrinsically Resistant   CEFEPIME Sensitive TAMMIE Final    CEFOXITIN Sensitive TAMMIE Final    CEFTAZIDIME Sensitive TAMMIE Final    CEFTRIAXONE Sensitive TAMMIE Final    CIPROFLOXACIN Resistant TAMMIE Final    GENTAMICIN Sensitive TAMMIE Final    LEVOFLOXACIN Resistant TAMMIE Final    NITROFURANTOIN Resistant TAMMIE Final     Intrinsically Resistant   Piperacillin/Tazo Sensitive TAMMIE Final    TOBRAMYCIN Sensitive TAMMIE Final    Trimethoprim/Sulfa Resistant TAMMIE Final      Allergies:  Amoxicillin-Pot Clavulanate  Azithromycin  Cephalexin  Compazine [Prochlorperazine]  Metoclopramide  Minocycline  Penicillins  Reglan [Metoclopramide Hcl]  Restoril [Temazepam]  Thorazine  [Chlorpromazine]  Abilify [Aripiprazole]  Lamotrigine  Sulfa Drugs      Medications:    Albuterol   Amlodipine  Atorvastatin   Carvedilol   Celexa   Ciprofloxacin  - as per HPI  Clonazepam   Diflucan   Eliquis   Eletriptan   Flexeril   Hydroxyzine   Levothyroxine   Lisinopril   Oxybutynin    Protonix   Senokot   Zyprexa     Past Medical History:    Abnormal Pap smear of cervix   Anxiety   Atrial fibrillation   Bipolar disorder    C. difficile colitis   Cervical high risk HPV (human papillomavirus) test positive   Depressive disorder   Essential hypertension   Gastro-oesophageal reflux disease   Hypothyroidism  Migraine   Spontaneous pneumothorax   Suicide attempt      Past Surgical History:    Cervix surgery   Left shoulder orthopedic surgery  Thoracic surgery     Family History:    Mother: Depression  Father: Lipids, Macular degeneration     Social History:  The patient was accompanied to the ED by her significant other.  Smoking Status: Current Every Day Smoker   Types: Cigarettes  Smokeless Tobacco: Never Used  Alcohol Use: Positive    Drug use: Positive   Types: Marijuana    Marital Status:       Review of Systems   Constitutional: Negative for fever.   Gastrointestinal: Positive for abdominal pain (lower abdomen/pelvis). Negative for nausea and vomiting.   Genitourinary: Positive for dysuria, frequency and pelvic pain. Negative for flank pain.   All other systems reviewed and are negative.    Physical Exam     Patient Vitals for the past 24 hrs:   BP Temp Temp src Pulse Heart Rate Resp SpO2 Weight   07/28/19 0708 -- -- -- -- -- -- -- 52.6 kg (116 lb)   07/28/19 0621 123/66 98.6  F (37  C) Oral 98 98 18 99 % --      Physical Exam  General: Well-developed and well-nourished. Well appearing middle aged  woman. Cooperative.  Head:  Atraumatic.  Eyes:  Conjunctivae, lids, and sclerae are normal.  ENT:    Normal nose. Moist mucous membranes.  Neck:  Supple. Normal range of motion.  CV:  Regular rate  and rhythm. Normal heart sounds with no murmurs, rubs, or gallops detected.  Resp:  No respiratory distress. Clear to auscultation bilaterally without decreased breath sounds, wheezing, rales, or rhonchi.  GI:  Soft. Non-distended. Non-tender.  No CVA tenderness.   MS:  Normal ROM.   Skin:  Warm. Non-diaphoretic. No pallor.  Neuro:  Awake. A&Ox3. Normal strength.  Psych: Normal mood and affect. Normal speech.  Vitals reviewed.    Emergency Department Course     Laboratory:  Laboratory findings were communicated with the patient who voiced understanding of the findings.    CBC: WBC 8.6, HGB 12.3,   BMP: Calcium 7.8 (L) o/w WNL (Creatinine 0.51 (L))  Lactic Acid (Resulted: 0718): 1.7     Interventions:  0704 NS 1000 mL IV  0723 Benadryl 25 mg IV  0723 Tylenol 650 mg PO  0724 Morphine 2 mg IV   0800 Rocephin 1 g IV  0819 Morphine 4 mg IV    Emergency Department Course:    0636 Nursing notes and vitals reviewed.    0641 I performed an exam of the patient as documented above.     0705 IV was inserted and blood was drawn for laboratory testing, results above.     0715 I discussed antibiotic options with the pharmacy.     0920 Patient rechecked and updated.  The patient reports no itching after receiving Rocephin and is comfortable with plan for discharge.     0927 I personally discussed the laboratory results with the patient and answered all related questions prior to discharge    Impression & Plan      Medical Decision Making:  Ana Laura is a 48 year old female on Cipro for prophylaxis against recurrent urinary tract infections who developed symptoms about 8 days ago and was seen by primary care.  She had persistent symptoms despite ciprofloxacin and called for her culture results which revealed a resistant organism and, given she has multiple medication reactions, she was sent to the emergency department.  She describes dysuria, foul-smelling urine, frequency, and general perineal pain though she denies true  abdominal or flank pain.  She has not had fever and she appears well on exam without CVA tenderness at this time, there is no role for imaging of the abdomen though with a procidentia (Proteus) infection she may need a nonemergent outpatient imaging to evaluate for nephrolithiasis.  There is no evidence of pyelonephritis. She was given Tylenol and morphine for pain and IV fluids during her work-up.  I reviewed the urine culture as above which is sensitive to third-generation cephalosporins (Rocephin).  Discussion with pharmacy and review of the reactions she has had in the past which are quite mild - primarily itching - means she would likely be able to tolerate a third generation cephalosporin.  As such, she was given 25 mg of Benadryl and then Rocephin.  While completing her antibiotic, basic laboratory studies were obtained which are unremarkable including no leukocytosis or kidney injury and no evidence of hypoperfusion with a normal lactic acid.  Patient tolerated the Rocephin quite well without itching or rash and no evidence of an adverse reaction.  As such, I believe she is appropriate for discharge with continuation of third-generation cephalosporin which her infection is sensitive to.  Patient is comfortable with this plan and agrees to take antibiotics as instructed.  She will also use Tylenol for mild to moderate pain and she was given a 5 tablet prescription for Norco though I am hesitant to give any more as she does have a history of opiate dependence in the past.  We discussed reactions to antibiotics and reasons to return though I recommended she use Benadryl if she has nonspecific itching with cefpodoxime.  Patient agrees to follow-up with her primary care provider though she will return with significant antibiotic allergic reaction or development of new concerns including, but not limited to, fever.  All questions answered.    Diagnosis:    ICD-10-CM    1. Complicated UTI (urinary tract infection)  N39.0      Disposition:   The patient was discharged home.     Discharge Medications:  START taking      Dose / Directions   cefpodoxime 200 MG tablet  Commonly known as:  VANTIN      Dose:  200 mg  Take 1 tablet (200 mg) by mouth 2 times daily for 10 days  Quantity:  20 tablet  Refills:  0     HYDROcodone-acetaminophen 5-325 MG tablet  Commonly known as:  NORCO      Dose:  1 tablet  Take 1 tablet by mouth every 6 hours as needed for pain  Quantity:  5 tablet  Refills:  0           Where to get your medicines      Some of these will need a paper prescription and others can be bought over the counter. Ask your nurse if you have questions.    Bring a paper prescription for each of these medications    cefpodoxime 200 MG tablet    HYDROcodone-acetaminophen 5-325 MG tablet        Scribe Disclosure:  I, Iliana Manning, am serving as a scribe at 6:36 AM on 7/28/2019 to document services personally performed by Mary Deras MD based on my observations and the provider's statements to me.        [facility] EMERGENCY DEPARTMENT       Mary Deras MD  07/31/19 3861

## 2019-07-28 NOTE — TELEPHONE ENCOUNTER
refill denied   On 7/13/19 -note states she does not need a refill and asked the writer to hold off on this-has appt 8/2/19

## 2019-07-28 NOTE — ED AVS SNAPSHOT
[facility] Emergency Department  [address]  [facility] [address]  Phone:  [phone]  Fax:  [phone]                                    Ana Laura Wharton   MRN: [id number]    Department:  [facility] Emergency Department   Date of Visit:  7/28/2019           After Visit Summary Signature Page    I have received my discharge instructions, and my questions have been answered. I have discussed any challenges I see with this plan with the nurse or doctor.    ..........................................................................................................................................  Patient/Patient Representative Signature      ..........................................................................................................................................  Patient Representative Print Name and Relationship to Patient    ..................................................               ................................................  Date                                   Time    ..........................................................................................................................................  Reviewed by Signature/Title    ...................................................              ..............................................  Date                                               Time          22EPIC Rev 08/18

## 2019-07-28 NOTE — DISCHARGE INSTRUCTIONS
Tylenol for mild to moderate pain and Norco for more severe pain.  Take antibiotics as instructed even if you are feeling better.  You tolerated a similar antibiotic here well but did have Benadryl beforehand.  Consider using Benadryl as needed for itching.  Stop your antibiotic and return immediately if you have face, lip, or tongue swelling or difficulty breathing or swallowing.  If this occurs return immediately to the emergency department.  You should also return if you are having fevers greater than 101  F, uncontrolled pain, or any other new or concerning symptoms.  Otherwise, follow-up with primary care this week.

## 2019-07-28 NOTE — ED TRIAGE NOTES
Was diagnose with UTI 8 days ago. Currently taking Cipro for UTI. Per patient, UA culture came back showing that bacteria resistance to Cipro, advised to come to ED for treatment. Still have dysuria, mid lower back pain, urinary frequency with cloudy urine. Feels like getting worst. ABCs intact.

## 2019-07-28 NOTE — TELEPHONE ENCOUNTER
Ana Laura has a bladder infection for the last week.  Culture done on Thursday.  Ana Laura is taking Cipro 500mg  for seven days.  Today Ana Laura is wanting to know sensitivities and is requesting an antibiotic as symptoms are still present.  FNA paged on call MD Charline Pickett to phone FNA back at 5:38 am.  MD advised to go to ED as Ana Laura has allergies to all medications for bladder infection.  FNA relayed message to Ana Laura.

## 2019-07-29 NOTE — TELEPHONE ENCOUNTER
Pt went to ED per the on call doctors advise as she was allergic to all sensitive medications.

## 2019-07-31 ENCOUNTER — TELEPHONE (OUTPATIENT)
Dept: INTERNAL MEDICINE | Facility: CLINIC | Age: 49
End: 2019-07-31

## 2019-07-31 ENCOUNTER — NURSE TRIAGE (OUTPATIENT)
Dept: NURSING | Facility: CLINIC | Age: 49
End: 2019-07-31

## 2019-07-31 ASSESSMENT — ENCOUNTER SYMPTOMS
ABDOMINAL PAIN: 1
FREQUENCY: 1
FEVER: 0
FLANK PAIN: 0

## 2019-07-31 NOTE — TELEPHONE ENCOUNTER
Patient isn't wanting to come in to see the MD before giving a urine sample. I explained she has to see the MD for them to write the order for the lab analysis.  Afia Hurtado RN-[facility]

## 2019-08-01 ENCOUNTER — HOSPITAL ENCOUNTER (OUTPATIENT)
Dept: MAMMOGRAPHY | Facility: CLINIC | Age: 49
Discharge: HOME | End: 2019-08-01
Attending: INTERNAL MEDICINE | Admitting: INTERNAL MEDICINE
Payer: COMMERCIAL

## 2019-08-01 DIAGNOSIS — Z12.31 ENCOUNTER FOR SCREENING MAMMOGRAM FOR BREAST CANCER: ICD-10-CM

## 2019-08-01 PROCEDURE — 77067 SCR MAMMO BI INCL CAD: CPT

## 2019-08-02 ENCOUNTER — OFFICE VISIT (OUTPATIENT)
Dept: PSYCHIATRY | Facility: CLINIC | Age: 49
End: 2019-08-02
Attending: PSYCHIATRY & NEUROLOGY
Payer: COMMERCIAL

## 2019-08-02 VITALS
DIASTOLIC BLOOD PRESSURE: 97 MMHG | SYSTOLIC BLOOD PRESSURE: 146 MMHG | WEIGHT: 117.8 LBS | HEART RATE: 99 BPM | BODY MASS INDEX: 19.01 KG/M2

## 2019-08-02 DIAGNOSIS — F31.9 BIPOLAR I DISORDER (H): ICD-10-CM

## 2019-08-02 PROCEDURE — G0463 HOSPITAL OUTPT CLINIC VISIT: HCPCS | Mod: ZF

## 2019-08-02 RX ORDER — MIRTAZAPINE 30 MG/1
60 TABLET, FILM COATED ORAL AT BEDTIME
Qty: 180 TABLET | Refills: 2 | Status: SHIPPED | OUTPATIENT
Start: 2019-08-02 | End: 2020-03-30

## 2019-08-02 RX ORDER — CITALOPRAM HYDROBROMIDE 20 MG/1
20 TABLET ORAL DAILY
Qty: 30 TABLET | Refills: 3 | Status: CANCELLED | OUTPATIENT
Start: 2019-08-02

## 2019-08-02 RX ORDER — CYCLOBENZAPRINE HCL 10 MG
10 TABLET ORAL AT BEDTIME
Qty: 30 TABLET | Refills: 2 | Status: SHIPPED | OUTPATIENT
Start: 2019-08-02 | End: 2019-08-16

## 2019-08-02 RX ORDER — CLONAZEPAM 1 MG/1
2 TABLET ORAL AT BEDTIME
Qty: 1 TABLET | Refills: 0 | Status: CANCELLED | OUTPATIENT
Start: 2019-08-02

## 2019-08-02 ASSESSMENT — PATIENT HEALTH QUESTIONNAIRE - PHQ9: SUM OF ALL RESPONSES TO PHQ QUESTIONS 1-9: 13

## 2019-08-02 ASSESSMENT — PAIN SCALES - GENERAL: PAINLEVEL: NO PAIN (0)

## 2019-08-06 ENCOUNTER — TELEPHONE (OUTPATIENT)
Dept: INTERNAL MEDICINE | Facility: CLINIC | Age: 49
End: 2019-08-06

## 2019-08-06 DIAGNOSIS — N39.0 URINARY TRACT INFECTION: Primary | ICD-10-CM

## 2019-08-06 DIAGNOSIS — N39.0 URINARY TRACT INFECTION: ICD-10-CM

## 2019-08-06 LAB
ALBUMIN UR-MCNC: NEGATIVE MG/DL
APPEARANCE UR: CLEAR
BACTERIA #/AREA URNS HPF: ABNORMAL /HPF
BILIRUB UR QL STRIP: NEGATIVE
COLOR UR AUTO: YELLOW
GLUCOSE UR STRIP-MCNC: NEGATIVE MG/DL
HGB UR QL STRIP: NEGATIVE
KETONES UR STRIP-MCNC: NEGATIVE MG/DL
LEUKOCYTE ESTERASE UR QL STRIP: NEGATIVE
NITRATE UR QL: NEGATIVE
NON-SQ EPI CELLS #/AREA URNS LPF: ABNORMAL /LPF
PH UR STRIP: 6 PH (ref 5–7)
RBC #/AREA URNS AUTO: ABNORMAL /HPF
SOURCE: ABNORMAL
SP GR UR STRIP: <=1.005 (ref 1–1.03)
UROBILINOGEN UR STRIP-ACNC: 0.2 EU/DL (ref 0.2–1)
WBC #/AREA URNS AUTO: ABNORMAL /HPF

## 2019-08-06 PROCEDURE — 81001 URINALYSIS AUTO W/SCOPE: CPT | Performed by: INTERNAL MEDICINE

## 2019-08-06 PROCEDURE — 87086 URINE CULTURE/COLONY COUNT: CPT | Performed by: INTERNAL MEDICINE

## 2019-08-06 NOTE — TELEPHONE ENCOUNTER
Call to pt. She finished her Cefpodoxime antibiotics today.   Last 3 days, her UTI symptoms returned. Frequency and burning.     No pain, no blood. No fever.     Please advise.

## 2019-08-06 NOTE — TELEPHONE ENCOUNTER
Patient calling about having another UTI. She just finished medication. Please call and advise. Patient in a lot of pain    Ok to call and lm [phone]

## 2019-08-07 LAB
BACTERIA SPEC CULT: NO GROWTH
SPECIMEN SOURCE: NORMAL

## 2019-08-07 NOTE — TELEPHONE ENCOUNTER
Call to pt and advised of Negative UA. Advised her that we need to wait for Culture. She agrees. Informed her to take Azo if having burning or pain. She agrees.

## 2019-08-16 ENCOUNTER — OFFICE VISIT (OUTPATIENT)
Dept: PSYCHIATRY | Facility: CLINIC | Age: 49
End: 2019-08-16
Attending: PSYCHIATRY & NEUROLOGY
Payer: COMMERCIAL

## 2019-08-16 VITALS
SYSTOLIC BLOOD PRESSURE: 157 MMHG | WEIGHT: 112.6 LBS | HEART RATE: 89 BPM | DIASTOLIC BLOOD PRESSURE: 94 MMHG | BODY MASS INDEX: 18.17 KG/M2

## 2019-08-16 DIAGNOSIS — F31.9 BIPOLAR I DISORDER (H): ICD-10-CM

## 2019-08-16 PROCEDURE — G0463 HOSPITAL OUTPT CLINIC VISIT: HCPCS | Mod: ZF

## 2019-08-16 RX ORDER — OLANZAPINE 10 MG/1
10 TABLET ORAL AT BEDTIME
Qty: 1 TABLET | Refills: 0 | Status: SHIPPED | OUTPATIENT
Start: 2019-08-16 | End: 2019-08-19

## 2019-08-16 RX ORDER — CITALOPRAM HYDROBROMIDE 20 MG/1
20 TABLET ORAL DAILY
Qty: 90 TABLET | Refills: 0 | Status: SHIPPED | OUTPATIENT
Start: 2019-08-16 | End: 2019-10-31

## 2019-08-16 RX ORDER — CYCLOBENZAPRINE HCL 10 MG
10 TABLET ORAL AT BEDTIME
Qty: 90 TABLET | Refills: 0 | Status: SHIPPED | OUTPATIENT
Start: 2019-08-16 | End: 2019-11-11

## 2019-08-16 RX ORDER — CLONAZEPAM 1 MG/1
2 TABLET ORAL AT BEDTIME
Qty: 180 TABLET | Refills: 0 | Status: SHIPPED | OUTPATIENT
Start: 2019-08-16 | End: 2019-08-19

## 2019-08-16 ASSESSMENT — PAIN SCALES - GENERAL: PAINLEVEL: NO PAIN (0)

## 2019-08-19 ENCOUNTER — MYC MEDICAL ADVICE (OUTPATIENT)
Dept: PSYCHIATRY | Facility: CLINIC | Age: 49
End: 2019-08-19

## 2019-08-19 DIAGNOSIS — F31.9 BIPOLAR I DISORDER (H): ICD-10-CM

## 2019-08-19 RX ORDER — CLONAZEPAM 1 MG/1
TABLET ORAL
Qty: 120 TABLET | Refills: 2
Start: 2019-08-19 | End: 2019-11-26

## 2019-08-19 RX ORDER — CLONAZEPAM 1 MG/1
TABLET ORAL
Qty: 120 TABLET | Refills: 0 | OUTPATIENT
Start: 2019-08-19

## 2019-08-19 RX ORDER — OLANZAPINE 10 MG/1
10 TABLET ORAL AT BEDTIME
Qty: 90 TABLET | Refills: 0 | Status: SHIPPED | OUTPATIENT
Start: 2019-08-19 | End: 2019-10-31

## 2019-08-19 RX ORDER — CLONAZEPAM 1 MG/1
2 TABLET ORAL AT BEDTIME
Qty: 60 TABLET | Refills: 2
Start: 2019-08-19 | End: 2019-08-19

## 2019-08-19 NOTE — TELEPHONE ENCOUNTER
Writer located unsigned paper Rx printed by the provider. Phoned this to pharmacistKimi with [facility] pharmacy at [phone]. She said the Rx can only be written for a 30 d/s. Writer ordered 30 d/s with 2 additional refills. Med tab changed to reflect this. Paper script voided and shredded. Notified pt via [employer].

## 2019-08-19 NOTE — TELEPHONE ENCOUNTER
Jaden Rodriguez MD Labossiere, Laura, RN   Caller: Unspecified (Today,  7:48 AM)             I did not decrease it.        Writer called Dominic back at [phone] and spoke with pharmacist, Tiny. Previous Klonopin Rx discontinued and Rx for 1 mg BID and 2 mg at bedtime phoned to Tiny. Med tab changed to reflect this.    Tiny also asked about olanzapine 10 mg, for #1. Informed her that this was an error and writer will resend Rx with an appropriate qty.

## 2019-08-28 ENCOUNTER — DOCUMENTATION ONLY (OUTPATIENT)
Dept: OTHER | Facility: CLINIC | Age: 49
End: 2019-08-28

## 2019-08-28 ENCOUNTER — AMBULATORY - HEALTHEAST (OUTPATIENT)
Dept: OTHER | Facility: CLINIC | Age: 49
End: 2019-08-28
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
## 2019-09-15 ENCOUNTER — MYC MEDICAL ADVICE (OUTPATIENT)
Dept: PSYCHIATRY | Facility: CLINIC | Age: 49
End: 2019-09-15

## 2019-09-15 DIAGNOSIS — F31.9 BIPOLAR I DISORDER (H): Primary | ICD-10-CM

## 2019-09-15 DIAGNOSIS — F41.9 ANXIETY DISORDER: ICD-10-CM

## 2019-09-16 RX ORDER — HYDROXYZINE PAMOATE 50 MG/1
CAPSULE ORAL
Qty: 270 CAPSULE | Refills: 0 | Status: SHIPPED | OUTPATIENT
Start: 2019-09-16 | End: 2020-07-16

## 2019-09-16 NOTE — TELEPHONE ENCOUNTER
Jaden Rodriguez MD Labossiere, Laura, RN   Caller: Unspecified (Yesterday,  8:30 AM)             Go with what Ana Laura is taking.        Rx sent to [facility] pharmacy. Pt notified via [employer].

## 2019-09-16 NOTE — TELEPHONE ENCOUNTER
"Last seen: 8/16/19  RTC: \"next opening\"  Cancel: none  No-show: none  Next appt: 9/27/19     Medication requested: hydroxyzine (Vistaril) 50 mg caps  Directions: Take one cap BID and one cap PRN for anxiety  Qty: 120  Last refilled: approximately 6/6/19 per med tab. Pt now requesting Rx to be sent to [facility]. Most recent Rx likely not filled.      -Message routed to provider, as most recent med orders on med tab says, \"Take 2 tabs by mouth 2 times daily PRN for anxiety\"    "

## 2019-09-17 NOTE — TELEPHONE ENCOUNTER
Jaden Rodriguez MD Pratt, Laura, RN   Caller: Unspecified (Today,  2:01 PM)             Yes      Writer resent Rx for tabs. Tried calling pt. No answer and VM not set up. Sent pt [employer] message providing an update.   
Writer received incoming phone call from pt. She reports the [facility] pharmacy is out of stock of hydroxyzine (Vistaril) 50 mg caps, along with [facility]. The pharmacy informed her they do not have an ETA for when the caps will be in stock. Agreed to call [facility] for more information.    Per staff with [facility], they do not have any dosage of hydroxyzine (Vistaril) caps in stock. Although, they do have the tabs (Atarax) in stock. Will confirm with provider that it's okay to reorder as tab form.   
Christianne Pillai)

## 2019-09-30 ENCOUNTER — HEALTH MAINTENANCE LETTER (OUTPATIENT)
Age: 49
End: 2019-09-30

## 2019-10-04 ENCOUNTER — TELEPHONE (OUTPATIENT)
Dept: PSYCHIATRY | Facility: CLINIC | Age: 49
End: 2019-10-04

## 2019-10-04 NOTE — TELEPHONE ENCOUNTER
Received second incoming phone call from the pt. She was even more tearful on the phone and shared that she's feeling incredibly sad, but not unsafe. The pt shared her stressors from the past year as well and her suicide attempts. The last being May of this year. Again, pt denied active SI at this time. Offered to contact the covering provider for medication recommendations. She would prefer to wait until Dr. Rodriguez returns on Monday for recommendations. Provided the on-call phone number and encouraged her to come into the hospital if her symptoms become unmanageable. She voiced understanding. Informed her that writer will be leaving at 2 pm this afternoon, but she can talk with other nurses if needed.
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
## 2019-10-04 NOTE — TELEPHONE ENCOUNTER
Hattie Hare, Hattie Cowan, RN          Previous Messages      ----- Message -----   From: Bhupinder Teresa   Sent: 10/4/2019   8:55 AM CDT   To: [facility]   Subject: Dr. Rodriguez distressed patient                   Patient called today stating she has been going through a bout of severe depression, and is looking to speak with her psychiatrist or nurse.

## 2019-10-09 ENCOUNTER — TELEPHONE (OUTPATIENT)
Dept: INTERNAL MEDICINE | Facility: CLINIC | Age: 49
End: 2019-10-09

## 2019-10-09 DIAGNOSIS — R30.0 DYSURIA: Primary | ICD-10-CM

## 2019-10-09 NOTE — TELEPHONE ENCOUNTER
Patient calling and stating she has another UTI and was wondering if she can do a lab only. Patient states she is in a lot of pain. 9 out of 10  Ok to call and lm [phone]

## 2019-10-09 NOTE — TELEPHONE ENCOUNTER
Per chart, patient has had recent UTIs. Please advise if lab only is okay or if patient should be seen.

## 2019-10-21 ENCOUNTER — APPOINTMENT (OUTPATIENT)
Dept: GENERAL RADIOLOGY | Facility: CLINIC | Age: 49
End: 2019-10-21
Attending: EMERGENCY MEDICINE
Payer: COMMERCIAL

## 2019-10-21 ENCOUNTER — TELEPHONE (OUTPATIENT)
Dept: INTERNAL MEDICINE | Facility: CLINIC | Age: 49
End: 2019-10-21

## 2019-10-21 ENCOUNTER — HOSPITAL ENCOUNTER (EMERGENCY)
Facility: CLINIC | Age: 49
Discharge: HOME | End: 2019-10-21
Attending: EMERGENCY MEDICINE | Admitting: EMERGENCY MEDICINE
Payer: COMMERCIAL

## 2019-10-21 ENCOUNTER — DOCUMENTATION ONLY (OUTPATIENT)
Dept: OTHER | Facility: CLINIC | Age: 49
End: 2019-10-21

## 2019-10-21 VITALS
DIASTOLIC BLOOD PRESSURE: 91 MMHG | BODY MASS INDEX: 19.37 KG/M2 | SYSTOLIC BLOOD PRESSURE: 136 MMHG | RESPIRATION RATE: 18 BRPM | OXYGEN SATURATION: 98 % | TEMPERATURE: 99.1 F | HEART RATE: 114 BPM | WEIGHT: 120 LBS

## 2019-10-21 DIAGNOSIS — S93.401A SPRAIN OF RIGHT ANKLE, UNSPECIFIED LIGAMENT, INITIAL ENCOUNTER: ICD-10-CM

## 2019-10-21 PROCEDURE — 99284 EMERGENCY DEPT VISIT MOD MDM: CPT

## 2019-10-21 PROCEDURE — 25000132 ZZH RX MED GY IP 250 OP 250 PS 637: Performed by: EMERGENCY MEDICINE

## 2019-10-21 PROCEDURE — 73610 X-RAY EXAM OF ANKLE: CPT | Mod: RT

## 2019-10-21 PROCEDURE — 73630 X-RAY EXAM OF FOOT: CPT | Mod: RT

## 2019-10-21 RX ORDER — ACETAMINOPHEN 325 MG/1
650 TABLET ORAL ONCE
Status: COMPLETED | OUTPATIENT
Start: 2019-10-21 | End: 2019-10-21

## 2019-10-21 RX ADMIN — ACETAMINOPHEN 650 MG: 325 TABLET, FILM COATED ORAL at 10:24

## 2019-10-21 ASSESSMENT — ENCOUNTER SYMPTOMS
JOINT SWELLING: 1
ARTHRALGIAS: 1

## 2019-10-21 NOTE — ED PROVIDER NOTES
History     Chief Complaint:  Ankle Pain     HPI   Ana Laura Wharton is a 49 year old female who presents for evaluation of right ankle pain. Yesterday the patient was stepping down from a curb when she twisted her right ankle and fell. She did not strike her head or lose consciousness in the fall. Since the fall the patient has developed pain, bruising and swelling in the ankle. Her pain is worse with movement and weight bearing. Due to her persistent pain this morning, the patient came into the ED for evaluation. She has been taking Ibuprofen with her last dose around 0630 this morning. Currently in the ED, the patient rates her pain at a severity of 8/10. She denies any other injuries. She is not currently anticoagulated.     Allergies:  Amoxicillin-Pot Clavulanate  Azithromycin  Cephalexin  Compazine  Metoclopramide  Minocycline  Penicillins  Reglan  Restoril  Thorazine   Abilify  Lamotrigine   Sulfa drugs      Medications:    Albuterol inhaler  Amlodipine   Lipitor  Coreg   Celexa  Klonopin  Flexeril  Relpax  Folvite  Vistaril  Norplant   Levothyroxine  Remeron  Myrbetriq   Multivitamin  Zyprexa  Zofran   Ditropan-XL   Protonix  Senokot     Past Medical History:    Anxiety  Bipolar disorder  C. Difficile colitis  Depression  Hypertension  GERD   Hypothyroidism  Migraine headaches  Spontaneous pneumothorax  Suicide attempt     Past Surgical History:    Left knee arthroscopy  Cervix surgery  Left knee surgery  Left shoulder surgery  Thoracic surgery for pneumothorax, pleurodesis and lobe resection     Family History:    Depression - Mother  Hyperlipidemia - Father  Macular degeneration - Father     Social History:  Tobacco use:    Current some day smoker    Alcohol use:    Negative   Drug use:    Marijuana   Marital status:       Accompanied to ED by:  Significant other      Review of Systems   Musculoskeletal: Positive for arthralgias (right ankle) and joint swelling (right ankle).   Neurological:  Negative for syncope.   All other systems reviewed and are negative.      Physical Exam   First Vitals:  BP: (!) 136/91  Pulse: 114  Temp: 99.1  F (37.3  C)  Resp: 18  Weight: 54.4 kg (120 lb)  SpO2: 98 %    Physical Exam  Right Lower Extremity:  Positive swelling at the ankle, negative foot swelling, positive tenderness over the ATFL, positive tenderness over the CFL, negative tenderness over the PTFL, positive tenderness of the deltoid ligament, negative tenderness over the distal 6cm of the lateral malleolus, negative tenderness over the distal 6cm of the medial malleolus, negative tenderness at the base of the 5th metatarsal, bony tenderness over the midfoot    Skin: Extensive bruising but intact. Bruising extends over the lateral ankle and foot, localized bruising over the deltoid ligament     Right Knee: No tenderness, full AROM    Neuro: sensation intact over the dorsal and plantar surface of the foot  CV: 2+ DP and PT pulses present in right lower extremity    Emergency Department Course     Imaging:  Radiographic findings were communicated with the patient who voiced understanding of the findings.    Foot XR, Right:  Right foot: No acute or significant chronic bony abnormality. A type I accessory navicular is noted.  Preliminary report per radiology.      Ankle XR, Right:   Right ankle: No fracture is seen. Possible mild widening of the lateral aspect of the tibiotalar joint could indicate ligamentous injury. Soft tissue swelling laterally.  Preliminary report per radiology.      Interventions:  1024 Tylenol 650 mg PO     Emergency Department Course:  Nursing notes and vitals reviewed.  1010: I performed an exam of the patient as documented above.     1108: I updated and reassessed the patient.     Findings and plan explained to the Patient. Patient discharged home with instructions regarding supportive care, medications, and reasons to return. The importance of close follow-up was reviewed.      Impression  & Plan      Medical Decision Making:  The patient presented for an acute ankle injury.  Hx and exam today is consistent with an ankle sprain.  Based on [place] ankle rules imaging was  indicated.  X-ray showed no evidence of fracture of the ankle.  There is no hip or knee pain or tenderness to suggest proximal injury.  I was concerned for more serious ligamentous injury given bilateral bruising and joint space widening on X-ray so she was given a CAM boot and advised on protected weight bearing with crutches.   I recommended Ortho follow up within 1 week to assess ligamentous stability.   Medications given for pain include Tylenol.     Diagnosis:    ICD-10-CM    1. Sprain of right ankle, unspecified ligament, initial encounter S93.401A     possible ligamentous injury on x-ray.  Follow up orthopedics     Disposition:  Discharged to home.     I, Phillip Squires, am serving as a scribe at 10:10 AM on 10/21/2019 to document services personally performed by Dr. Yeh, based on my observations and the provider's statements to me.    [facility] EMERGENCY DEPARTMENT       Debbie Yeh MD  10/21/19 7888

## 2019-10-21 NOTE — ED AVS SNAPSHOT
[facility] Emergency Department  [address]  [facility] [address]  Phone:  [phone]  Fax:  [phone]                                    Ana Laura Wharton   MRN: [id number]    Department:  [facility] Emergency Department   Date of Visit:  10/21/2019           After Visit Summary Signature Page    I have received my discharge instructions, and my questions have been answered. I have discussed any challenges I see with this plan with the nurse or doctor.    ..........................................................................................................................................  Patient/Patient Representative Signature      ..........................................................................................................................................  Patient Representative Print Name and Relationship to Patient    ..................................................               ................................................  Date                                   Time    ..........................................................................................................................................  Reviewed by Signature/Title    ...................................................              ..............................................  Date                                               Time          22EPIC Rev 08/18

## 2019-10-21 NOTE — TELEPHONE ENCOUNTER
Reason for Call:  Medication Request    Detailed comments: Patient called asking for pain medication until she see ortho doctor on Wednesday 10/23/19. She was seen in the ED today for a sprained right ankle and they were only able to give her tylenol in the ED, which is not helping with the pain. She uses [facility] in [place] on [address] in [address] [employer]. Please call her back to advise.    Phone Number Patient can be reached at: Cell number on file:    Telephone Information:   Mobile [phone]       Best Time: any    Can we leave a detailed message on this number? YES    Call taken on 10/21/2019 at 1:59 PM by Elma Negro

## 2019-10-21 NOTE — TELEPHONE ENCOUNTER
Pt asking for pain medication. She did not get anything at the ED today.   Tylenol not helping.     Please advise.

## 2019-10-21 NOTE — ED TRIAGE NOTES
Twisted right ankle yesterday when slipped off curb, ankle swollen, bruised and painful.  Good CMS

## 2019-10-23 ENCOUNTER — TRANSFERRED RECORDS (OUTPATIENT)
Dept: HEALTH INFORMATION MANAGEMENT | Facility: CLINIC | Age: 49
End: 2019-10-23

## 2019-10-23 DIAGNOSIS — E03.9 ACQUIRED HYPOTHYROIDISM: ICD-10-CM

## 2019-10-23 DIAGNOSIS — E78.5 HYPERLIPIDEMIA LDL GOAL <130: ICD-10-CM

## 2019-10-24 ENCOUNTER — TELEPHONE (OUTPATIENT)
Dept: INTERNAL MEDICINE | Facility: CLINIC | Age: 49
End: 2019-10-24

## 2019-10-24 DIAGNOSIS — F31.9 BIPOLAR I DISORDER (H): ICD-10-CM

## 2019-10-24 NOTE — TELEPHONE ENCOUNTER
Last TSH done 5/11/19 was 7.0, previous one 1/9/19 was 1.88.  Pt has appt for physical scheduled 1/13/20.  Routing refill request to provider for review/approval because:  Labs out of range:  TSH

## 2019-10-24 NOTE — TELEPHONE ENCOUNTER
"Requested Prescriptions   Pending Prescriptions Disp Refills     atorvastatin (LIPITOR) 10 MG tablet [Pharmacy Med Name: ATORVASTATIN CALCIUM 10 MG Tablet] 90 tablet 3     Sig: TAKE 1 TABLET EVERY DAY   Last Written Prescription Date:  01/22/2019  Last Fill Quantity: 90,  # refills: 03   Last office visit: 7/1/2019 with prescribing provider:     Future Office Visit:   Next 5 appointments (look out 90 days)    Jan 13, 2020  9:40 AM CST  PHYSICAL with Lib[facility] MD  [facility] [address]  [facility] [address]  [phone]           Statins Protocol Passed - 10/23/2019  5:51 PM        Passed - LDL on file in past 12 months     Recent Labs   Lab Test 07/01/19  0920   LDL <1             Passed - No abnormal creatine kinase in past 12 months     Recent Labs   Lab Test 05/13/19  0512                   Passed - Recent (12 mo) or future (30 days) visit within the authorizing provider's specialty     Patient has had an office visit with the authorizing provider or a provider within the authorizing providers department within the previous 12 mos or has a future within next 30 days. See \"Patient Info\" tab in inbasket, or \"Choose Columns\" in Meds & Orders section of the refill encounter.              Passed - Medication is active on med list        Passed - Patient is age 18 or older        Passed - No active pregnancy on record        Passed - No positive pregnancy test in past 12 months        levothyroxine (SYNTHROID/LEVOTHROID) 50 MCG tablet [Pharmacy Med Name: LEVOTHYROXINE SODIUM 50 MCG Tablet] 90 tablet 3     Sig: TAKE 1 TABLET EVERY DAY   Last Written Prescription Date:  01/09/2019  Last Fill Quantity: 90,  # refills: 03   Last office visit: 7/1/2019 with prescribing provider:     Future Office Visit:   Next 5 appointments (look out 90 days)    Jan 13, 2020  9:40 AM CST  PHYSICAL with Liborio Lincoln MD  [facility] " "([facility]) [address]  [facility] [address]  [phone]           Thyroid Protocol Failed - 10/23/2019  5:51 PM        Failed - Normal TSH on file in past 12 months     Recent Labs   Lab Test 05/11/19 2018   TSH 7.01*              Passed - Patient is 12 years or older        Passed - Recent (12 mo) or future (30 days) visit within the authorizing provider's specialty     Patient has had an office visit with the authorizing provider or a provider within the authorizing providers department within the previous 12 mos or has a future within next 30 days. See \"Patient Info\" tab in inbasket, or \"Choose Columns\" in Meds & Orders section of the refill encounter.              Passed - Medication is active on med list        Passed - No active pregnancy on record     If patient is pregnant or has had a positive pregnancy test, please check TSH.          Passed - No positive pregnancy test in past 12 months     If patient is pregnant or has had a positive pregnancy test, please check TSH.          "

## 2019-10-24 NOTE — TELEPHONE ENCOUNTER
Patient called and requesting Dr Capone could request from her insurance a tier exemption so she can be consider a tier 2 for her medication mirabegron (MYRBETRIQ) 25 MG 24 hr tablet. This will make it more affordable and since she has allready tried two other medications that did not work. Ok to call and lm [phone]

## 2019-10-24 NOTE — TELEPHONE ENCOUNTER
See her message below. Asking for Tier exception for Myrbetriq.     Tried Detrol, Oxybutynin.     Call to [employer].   This is Tier 3. It is $300 for 3 months. ($100 a month).

## 2019-10-25 RX ORDER — LEVOTHYROXINE SODIUM 50 UG/1
TABLET ORAL
Qty: 90 TABLET | Refills: 0 | Status: SHIPPED | OUTPATIENT
Start: 2019-10-25 | End: 2020-01-02

## 2019-10-25 RX ORDER — OLANZAPINE 10 MG/1
10 TABLET ORAL AT BEDTIME
Qty: 90 TABLET | Refills: 0 | OUTPATIENT
Start: 2019-10-25

## 2019-10-25 RX ORDER — ATORVASTATIN CALCIUM 10 MG/1
TABLET, FILM COATED ORAL
Qty: 90 TABLET | Refills: 0 | Status: SHIPPED | OUTPATIENT
Start: 2019-10-25 | End: 2020-01-02

## 2019-10-25 NOTE — TELEPHONE ENCOUNTER
Central Prior Authorization Team   Phone: [phone]      Tier Exception Initiation    Medication: mirabegron (MYRBETRIQ) 25 MG 24 hr tablet  Insurance Company: [employer] - Phone [phone] Fax [phone]  Pharmacy Filling the RX: [employer] [facility] - [facility] [address]  Filling Pharmacy Phone: [phone]      Tier exception initiated via phone with Ibis from [employer].     Reference # [id number]

## 2019-10-28 NOTE — TELEPHONE ENCOUNTER
Central Prior Authorization Team   Phone: [phone]      Tier Exception Denied    Medication: mirabegron (MYRBETRIQ) 25 MG 24 hr tablet  Insurance Company: [employer] - Phone [phone] Fax [phone]      Since there isn't a brand name drug for your condition at a lower cost-sharing tier, a tier exception is not permitted.

## 2019-10-29 ENCOUNTER — TELEPHONE (OUTPATIENT)
Dept: INTERNAL MEDICINE | Facility: CLINIC | Age: 49
End: 2019-10-29

## 2019-10-29 NOTE — TELEPHONE ENCOUNTER
Patient calls the clinic crying that her ankle from ED visit on 10/21/19 that she is in severe pain.  Patient also saw [facility] and they told her that it was not just an ankle sprain, but that she had multiple sprains, breaks, and ligament tears.  They did not discuss further treatment or surgery and told her to use her crutches and gave her 20 qty. Norco only.  Patient states the pain is unbearable and wants Dr. Lincoln to know this. Patient is asking for something more for pain.    Patient also states she has a UTI with frequency, burning, and very foul smelling urine and is asking for an RX.     Please address as soon as possible per patient and get back to her.

## 2019-10-29 NOTE — TELEPHONE ENCOUNTER
See message below. [facility] report scanned in on 10/23. Gave her Norco Rx #20, pt reported amount. Pt is asking for something more for pain. States it is unbearable.     Also reports she has UTI, frequency, strong odor, burning. Asking for Rx.     She is non wt bearing on her foot.     Please advise for both.

## 2019-10-29 NOTE — TELEPHONE ENCOUNTER
Dr Lincoln states he will work pt in tomorrow, Wednesday. AM is better.     LM to see if can do 9:20 or 10:40? Afternoon would be OK if cannot do AM at all.

## 2019-10-30 ENCOUNTER — OFFICE VISIT (OUTPATIENT)
Dept: INTERNAL MEDICINE | Facility: CLINIC | Age: 49
End: 2019-10-30
Payer: COMMERCIAL

## 2019-10-30 ENCOUNTER — TELEPHONE (OUTPATIENT)
Dept: INTERNAL MEDICINE | Facility: CLINIC | Age: 49
End: 2019-10-30

## 2019-10-30 VITALS
BODY MASS INDEX: 21.05 KG/M2 | HEIGHT: 66 IN | OXYGEN SATURATION: 98 % | RESPIRATION RATE: 16 BRPM | TEMPERATURE: 98.4 F | DIASTOLIC BLOOD PRESSURE: 78 MMHG | WEIGHT: 131 LBS | HEART RATE: 85 BPM | SYSTOLIC BLOOD PRESSURE: 122 MMHG

## 2019-10-30 DIAGNOSIS — R30.0 DYSURIA: Primary | ICD-10-CM

## 2019-10-30 DIAGNOSIS — Z23 NEED FOR PROPHYLACTIC VACCINATION AND INOCULATION AGAINST INFLUENZA: ICD-10-CM

## 2019-10-30 DIAGNOSIS — I10 ESSENTIAL HYPERTENSION: ICD-10-CM

## 2019-10-30 DIAGNOSIS — R82.90 ABNORMAL FINDING IN URINE: ICD-10-CM

## 2019-10-30 DIAGNOSIS — S96.911S RIGHT FOOT STRAIN, SEQUELA: ICD-10-CM

## 2019-10-30 DIAGNOSIS — F31.76 DEPRESSED BIPOLAR I DISORDER IN FULL REMISSION (H): ICD-10-CM

## 2019-10-30 LAB
ALBUMIN UR-MCNC: NEGATIVE MG/DL
APPEARANCE UR: ABNORMAL
BACTERIA #/AREA URNS HPF: ABNORMAL /HPF
BILIRUB UR QL STRIP: NEGATIVE
COLOR UR AUTO: YELLOW
GLUCOSE UR STRIP-MCNC: NEGATIVE MG/DL
HGB UR QL STRIP: NEGATIVE
KETONES UR STRIP-MCNC: NEGATIVE MG/DL
LEUKOCYTE ESTERASE UR QL STRIP: ABNORMAL
NITRATE UR QL: POSITIVE
NON-SQ EPI CELLS #/AREA URNS LPF: ABNORMAL /LPF
PH UR STRIP: 8.5 PH (ref 5–7)
RBC #/AREA URNS AUTO: ABNORMAL /HPF
SOURCE: ABNORMAL
SP GR UR STRIP: 1.02 (ref 1–1.03)
UROBILINOGEN UR STRIP-ACNC: 0.2 EU/DL (ref 0.2–1)
WBC #/AREA URNS AUTO: ABNORMAL /HPF

## 2019-10-30 PROCEDURE — 99214 OFFICE O/P EST MOD 30 MIN: CPT | Mod: 25 | Performed by: INTERNAL MEDICINE

## 2019-10-30 PROCEDURE — 87088 URINE BACTERIA CULTURE: CPT | Performed by: INTERNAL MEDICINE

## 2019-10-30 PROCEDURE — 81001 URINALYSIS AUTO W/SCOPE: CPT | Performed by: INTERNAL MEDICINE

## 2019-10-30 PROCEDURE — 87086 URINE CULTURE/COLONY COUNT: CPT | Performed by: INTERNAL MEDICINE

## 2019-10-30 PROCEDURE — 90686 IIV4 VACC NO PRSV 0.5 ML IM: CPT | Performed by: INTERNAL MEDICINE

## 2019-10-30 PROCEDURE — G0008 ADMIN INFLUENZA VIRUS VAC: HCPCS | Performed by: INTERNAL MEDICINE

## 2019-10-30 PROCEDURE — 87186 SC STD MICRODIL/AGAR DIL: CPT | Performed by: INTERNAL MEDICINE

## 2019-10-30 RX ORDER — HYDROCODONE BITARTRATE AND ACETAMINOPHEN 5; 325 MG/1; MG/1
1-2 TABLET ORAL EVERY 8 HOURS PRN
Qty: 30 TABLET | Refills: 0 | Status: SHIPPED | OUTPATIENT
Start: 2019-10-30 | End: 2019-12-13

## 2019-10-30 RX ORDER — IBUPROFEN 400 MG/1
400 TABLET, FILM COATED ORAL 3 TIMES DAILY
Qty: 30 TABLET | Refills: 1 | Status: ON HOLD | OUTPATIENT
Start: 2019-10-30 | End: 2020-11-29

## 2019-10-30 ASSESSMENT — PAIN SCALES - GENERAL: PAINLEVEL: SEVERE PAIN (7)

## 2019-10-30 ASSESSMENT — MIFFLIN-ST. JEOR: SCORE: 1235.96

## 2019-10-30 NOTE — TELEPHONE ENCOUNTER
Call to pt. She states she has a lot of allergies.     She states she got an IV antibiotics in the hospital when she had UTI last time that was on her allergy list but since only caused itching, she tolerated it fine.     Will keep open for now until UC returns.

## 2019-10-30 NOTE — PROGRESS NOTES
Subjective     Ana Laura Wharton is a 49 year old female who presents to clinic today for the following health issues:    HPI   foul smelling urine, pain with urination and burning. Pt took 3 days of Cipro 1 week ago symptoms seem to improve returned again over the weekend. fractured R ankle over the weekend stepped off a curb wrong, pt wanting to dicuss pain control.     Presents with sympoms of urinary frequency, burning. Present for 3  days. No  suprapubic or flank tenderness. No fevers, chills. No nausea, vomiting. Has had prior history of UTIs. On suppressive treatment with Cipro.   Noted foul smelling urine. Cloudy appearance.     Has tripped and injured her right ankle and foot a week ago. Seen ortho. Has fractures of the foot, conservative treatment with a cam boot advised. Has swelling, bruising of the foot. Has pain with ambulation.         PROBLEMS TO ADD ON...  Has h/o depression. On medical treatment, controlled, no side effects. Has chronic depressive symptoms , no suicidal ideation.        Patient Active Problem List   Diagnosis     Depressed bipolar I disorder in full remission (H)     Anxiety disorder     Suicide ideation     Bipolar disorder current episode depressed (H)     Overdose     Depression     Mood disorder (H)     Suicidal ideations     Bipolar I disorder (H)     Acute renal failure (H)     Alternating exotropia     Anxiety state     Other bipolar disorders     Personality disorder (H)     Tear film insufficiency     Dysfunctions associated with sleep stages or arousal from sleep     Abnormal finding of blood chemistry     Esophageal reflux     Other specified hypothyroidism     Impulse control disorder     Acidosis     Low back pain     Myopia     Opioid dependence (H)     Orofacial dyskinesia     Intractable migraine     Pure hypercholesterolemia     Poisoning by 4-aminophenol derivatives, undetermined intent     Essential hypertension     Left knee pain     Aftercare following surgery of  the musculoskeletal system     UTI (urinary tract infection)     Acute cystitis     Clostridium difficile colitis     Drug overdose     Calcium channel blocker overdose     Intentional acetaminophen overdose (H)     Alcoholic intoxication with complication (H)     Hypotension due to medication     Acute renal failure, unspecified acute renal failure type (H)     Paroxysmal atrial fibrillation (H)     Acute respiratory failure with hypoxia (H)     Acute pulmonary edema (H)     Spontaneous pneumothorax     PRES (posterior reversible encephalopathy syndrome)     Pancreatitis     Atypical squamous cells cannot exclude high grade squamous intraepithelial lesion on cytologic smear of cervix (ASC-H)     Past Surgical History:   Procedure Laterality Date     ARTHROSCOPY KNEE      L knee     CERVIX SURGERY      for pre-cancerous changes     left knee surgery       ORTHOPEDIC SURGERY      L shoulder     THORACIC SURGERY      for pneumothorax, pleurodesis and lobe resection       Social History     Tobacco Use     Smoking status: Current Every Day Smoker     Packs/day: 0.50     Types: Cigarettes     Smokeless tobacco: Never Used   Substance Use Topics     Alcohol use: Yes     Alcohol/week: 2.0 standard drinks     Types: 2 Glasses of wine per week     Comment: 1 glass of wine per week     Family History   Problem Relation Age of Onset     Depression Mother      Lipids Father         hyperlipidemia     Macular Degeneration Father          Current Outpatient Medications   Medication Sig Dispense Refill     albuterol (PROAIR HFA/PROVENTIL HFA/VENTOLIN HFA) 108 (90 Base) MCG/ACT inhaler Inhale 2 puffs into the lungs every 6 hours as needed for shortness of breath / dyspnea or wheezing 1 Inhaler 0     amLODIPine (NORVASC) 10 MG tablet Take 1 tablet (10 mg) by mouth daily 90 tablet 3     amoxicillin-clavulanate (AUGMENTIN) 875-125 MG tablet Take 1 tablet by mouth 2 times daily 14 tablet 0     atorvastatin (LIPITOR) 10 MG tablet TAKE  1 TABLET EVERY DAY 90 tablet 0     carvedilol (COREG) 12.5 MG tablet Take 1 tablet (12.5 mg) by mouth 2 times daily (with meals) 180 tablet 3     ciprofloxacin (CIPRO) 250 MG tablet Take 0.5 tablets (125 mg) by mouth daily For UTI prevention 45 tablet 3     clonazePAM (KLONOPIN) 1 MG tablet Take 1 tab (1 mg) BID and 2 tabs (2 mg) at bedtime 120 tablet 2     cyclobenzaprine (FLEXERIL) 10 MG tablet Take 1 tablet (10 mg) by mouth At Bedtime 90 tablet 0     eletriptan (RELPAX) 40 MG tablet Take 40 mg by mouth at onset of headache for migraine       FIBER PO Take 2 tablets by mouth daily       folic acid (FOLVITE) 400 MCG tablet Take 400 mcg by mouth daily       HYDROcodone-acetaminophen (NORCO) 5-325 MG tablet Take 1-2 tablets by mouth every 8 hours as needed for pain 30 tablet 0     hydrOXYzine (VISTARIL) 50 MG capsule Take 1 cap BID and 1 cap PRN for anxiety 270 capsule 0     ibuprofen (ADVIL/MOTRIN) 400 MG tablet Take 1 tablet (400 mg) by mouth 3 times daily 30 tablet 1     Levonorgestrel, [id number], (NORPLANT SYSTEM IL)        levothyroxine (SYNTHROID/LEVOTHROID) 50 MCG tablet TAKE 1 TABLET EVERY DAY 90 tablet 0     mirtazapine (REMERON) 30 MG tablet Take 2 tablets (60 mg) by mouth At Bedtime 180 tablet 2     Multiple Vitamins-Minerals (WOMENS MULTI VITAMIN & MINERAL PO) Take 1 tablet by mouth daily       ondansetron (ZOFRAN) 4 MG tablet Take 1 tablet (4 mg) by mouth every 8 hours as needed for nausea 30 tablet 0     pantoprazole (PROTONIX) 40 MG EC tablet Take 1 tablet (40 mg) by mouth every morning (before breakfast) 90 tablet 3     senna (SENOKOT) 8.6 MG tablet Take 1 tablet by mouth 2 times daily as needed for constipation 60 tablet 3     citalopram (CELEXA) 20 MG tablet Take 1 tablet (20 mg) by mouth daily 90 tablet 1     OLANZapine (ZYPREXA) 10 MG tablet Take 1 tablet (10 mg) by mouth At Bedtime 90 tablet 1         Reviewed and updated as needed this visit by Provider         Review of Systems   ROS COMP:  "Constitutional, HEENT, cardiovascular, pulmonary, gi and gu systems are negative, except as otherwise noted.      Objective    /78 (BP Location: Left arm, Patient Position: Sitting, Cuff Size: Adult Regular)   Pulse 85   Temp 98.4  F (36.9  C) (Oral)   Resp 16   Ht 1.676 m (5' 6\")   Wt 59.4 kg (131 lb)   SpO2 98%   BMI 21.14 kg/m    Body mass index is 21.14 kg/m .  Physical Exam   GENERAL: healthy, alert and no distress  NECK: no adenopathy, no asymmetry, masses, or scars and thyroid normal to palpation  RESP: lungs clear to auscultation - no rales, rhonchi or wheezes  CV: regular rate and rhythm, normal S1 S2, no S3 or S4, no murmur, click or rub, no peripheral edema and peripheral pulses strong  ABDOMEN: soft, nontender, no hepatosplenomegaly, no masses and bowel sounds normal  MS: no gross musculoskeletal defects noted, no edema  Right foot and ankle edema, bruised, palpatory tender   SKIN: no suspicious lesions or rashes    Diagnostic Test Results:  Labs reviewed in Epic  Results for orders placed or performed in visit on 10/30/19   *UA reflex to Microscopic and Culture ([place] and [facility] (except [place] and [place])     Status: Abnormal   Result Value Ref Range    Color Urine Yellow     Appearance Urine Slightly Cloudy     Glucose Urine Negative NEG^Negative mg/dL    Bilirubin Urine Negative NEG^Negative    Ketones Urine Negative NEG^Negative mg/dL    Specific Gravity Urine 1.020 1.003 - 1.035    Blood Urine Negative NEG^Negative    pH Urine 8.5 (H) 5.0 - 7.0 pH    Protein Albumin Urine Negative NEG^Negative mg/dL    Urobilinogen Urine 0.2 0.2 - 1.0 EU/dL    Nitrite Urine Positive (A) NEG^Negative    Leukocyte Esterase Urine Trace (A) NEG^Negative    Source Midstream Urine    Urine Microscopic     Status: Abnormal   Result Value Ref Range    WBC Urine 10-25 (A) OTO5^0 - 5 /HPF    RBC Urine O - 2 OTO2^O - 2 /HPF    Squamous Epithelial /LPF Urine Moderate (A) FEW^Few /LPF    Bacteria " Urine Many (A) NEG^Negative /HPF   Urine Culture Aerobic Bacterial     Status: Abnormal (Preliminary result)   Result Value Ref Range    Specimen Description Midstream Urine     Culture Micro (A)      >100,000 colonies/mL  Providencia (Proteus) rettgeri      Culture Micro Susceptibility testing in progress            Assessment & Plan   Problem List Items Addressed This Visit     Depressed bipolar I disorder in full remission (H)    Essential hypertension      Other Visit Diagnoses     Dysuria    -  Primary    Relevant Medications    amoxicillin-clavulanate (AUGMENTIN) 875-125 MG tablet    Other Relevant Orders    *UA reflex to Microscopic and Culture ([place] and [facility] (except [place] and [place]) (Completed)    Urine Microscopic (Completed)    Abnormal finding in urine        Relevant Orders    Urine Culture Aerobic Bacterial (Completed)    Right foot strain, sequela        Relevant Medications    HYDROcodone-acetaminophen (NORCO) 5-325 MG tablet    ibuprofen (ADVIL/MOTRIN) 400 MG tablet    Need for prophylactic vaccination and inoculation against influenza        Relevant Orders    INFLUENZA VACCINE IM > 6 MONTHS VALENT IIV4 [22161] (Completed)    Vaccine Administration, Initial [47843] (Completed)           Assess UA for recurrent UTI   Continue Norco and Ibuprofen for ankle pain control  Follow up with ortho     Tobacco Cessation:   reports that she has been smoking cigarettes. She has been smoking about 0.50 packs per day. She has never used smokeless tobacco.          See Patient Instructions  Return in about 4 weeks (around 11/27/2019) for follow up on acute problem if persists.    Liborio Lincoln MD  [facility]

## 2019-10-30 NOTE — TELEPHONE ENCOUNTER
UA showed UTI, but would recommend to await the culture results as she is on chronic antibiotic for prevention of UTI and may have resistant bacteria.

## 2019-10-30 NOTE — NURSING NOTE
"Chief Complaint   Patient presents with     UTI     foul smelling urine, pain with urination and burning. Pt took 3 days of Cipro 1 week ago symptoms seem to improve returned again over the weekend.      Recheck Medication     fractured R ankle over the weekend stepped off a curb wrong, pt wanting to dicuss pain control.      initial /78 (BP Location: Left arm, Patient Position: Sitting, Cuff Size: Adult Regular)   Pulse 85   Temp 98.4  F (36.9  C) (Oral)   Resp 16   Ht 1.676 m (5' 6\")   Wt 59.4 kg (131 lb)   SpO2 98%   BMI 21.14 kg/m   Estimated body mass index is 21.14 kg/m  as calculated from the following:    Height as of this encounter: 1.676 m (5' 6\").    Weight as of this encounter: 59.4 kg (131 lb)..  bp completed using cuff size regular    "

## 2019-10-31 ENCOUNTER — OFFICE VISIT (OUTPATIENT)
Dept: PSYCHIATRY | Facility: CLINIC | Age: 49
End: 2019-10-31
Attending: PSYCHIATRY & NEUROLOGY
Payer: COMMERCIAL

## 2019-10-31 VITALS
DIASTOLIC BLOOD PRESSURE: 61 MMHG | BODY MASS INDEX: 21.47 KG/M2 | SYSTOLIC BLOOD PRESSURE: 94 MMHG | HEART RATE: 79 BPM | WEIGHT: 133 LBS

## 2019-10-31 DIAGNOSIS — F31.9 BIPOLAR I DISORDER (H): ICD-10-CM

## 2019-10-31 PROCEDURE — G0463 HOSPITAL OUTPT CLINIC VISIT: HCPCS | Mod: ZF

## 2019-10-31 RX ORDER — CITALOPRAM HYDROBROMIDE 20 MG/1
20 TABLET ORAL DAILY
Qty: 90 TABLET | Refills: 1 | Status: SHIPPED | OUTPATIENT
Start: 2019-10-31 | End: 2020-04-30

## 2019-10-31 RX ORDER — OLANZAPINE 10 MG/1
10 TABLET ORAL AT BEDTIME
Qty: 90 TABLET | Refills: 1 | Status: SHIPPED | OUTPATIENT
Start: 2019-10-31 | End: 2020-04-16

## 2019-10-31 ASSESSMENT — PAIN SCALES - GENERAL: PAINLEVEL: MODERATE PAIN (5)

## 2019-10-31 NOTE — TELEPHONE ENCOUNTER
Patient calling back and would like to know if Dr Capone will treat her UTI? Ok to call and  [phone]  What was used at the hospital worked very well and patient wanted Dr Capone to know

## 2019-11-01 NOTE — TELEPHONE ENCOUNTER
Per results note:    Notes recorded by Liborio Lincoln MD on 11/1/2019 at 10:00 AM CDT  Urine culture growing same bacteria as last UTI. Final culture not yet ready, but likely will be having same sensitivity. Recommend to start on Augmentin. She had side effects of itching with it in the past. She can take Benadryl to help if needed - 25 mg bid. Will call in Augmentin.    Call to pt and advised. She agrees.

## 2019-11-02 LAB
BACTERIA SPEC CULT: ABNORMAL
SPECIMEN SOURCE: ABNORMAL

## 2019-11-04 ENCOUNTER — TELEPHONE (OUTPATIENT)
Dept: INTERNAL MEDICINE | Facility: CLINIC | Age: 49
End: 2019-11-04

## 2019-11-04 RX ORDER — HEPARIN SODIUM,PORCINE 10 UNIT/ML
5 VIAL (ML) INTRAVENOUS
Status: CANCELLED | OUTPATIENT
Start: 2019-11-04

## 2019-11-04 RX ORDER — HEPARIN SODIUM (PORCINE) LOCK FLUSH IV SOLN 100 UNIT/ML 100 UNIT/ML
5 SOLUTION INTRAVENOUS
Status: CANCELLED | OUTPATIENT
Start: 2019-11-04

## 2019-11-04 NOTE — TELEPHONE ENCOUNTER
Orders entered for Rocephin. Pt scheduled for tomorrow AM. Reviewed with her to go to [facility] infusion Center.   Answered all her questions. She agrees with the plan.   .

## 2019-11-05 ENCOUNTER — ALLIED HEALTH/NURSE VISIT (OUTPATIENT)
Dept: INFUSION THERAPY | Facility: CLINIC | Age: 49
End: 2019-11-05
Attending: INTERNAL MEDICINE
Payer: COMMERCIAL

## 2019-11-05 VITALS
TEMPERATURE: 98.4 F | DIASTOLIC BLOOD PRESSURE: 87 MMHG | OXYGEN SATURATION: 98 % | SYSTOLIC BLOOD PRESSURE: 130 MMHG | HEART RATE: 106 BPM | RESPIRATION RATE: 16 BRPM

## 2019-11-05 DIAGNOSIS — N39.0 URINARY TRACT INFECTION WITH HEMATURIA, SITE UNSPECIFIED: Primary | ICD-10-CM

## 2019-11-05 DIAGNOSIS — R31.9 URINARY TRACT INFECTION WITH HEMATURIA, SITE UNSPECIFIED: Primary | ICD-10-CM

## 2019-11-05 PROCEDURE — 96374 THER/PROPH/DIAG INJ IV PUSH: CPT

## 2019-11-05 PROCEDURE — 25000125 ZZHC RX 250: Performed by: INTERNAL MEDICINE

## 2019-11-05 PROCEDURE — 25000128 H RX IP 250 OP 636: Performed by: INTERNAL MEDICINE

## 2019-11-05 RX ORDER — HEPARIN SODIUM (PORCINE) LOCK FLUSH IV SOLN 100 UNIT/ML 100 UNIT/ML
5 SOLUTION INTRAVENOUS
Status: CANCELLED | OUTPATIENT
Start: 2019-11-05

## 2019-11-05 RX ORDER — CEFTRIAXONE SODIUM 1 G/1
1 INJECTION, POWDER, FOR SOLUTION INTRAMUSCULAR; INTRAVENOUS ONCE
Status: CANCELLED | OUTPATIENT
Start: 2019-11-05

## 2019-11-05 RX ORDER — CEFTRIAXONE SODIUM 1 G/1
1 INJECTION, POWDER, FOR SOLUTION INTRAMUSCULAR; INTRAVENOUS ONCE
Status: COMPLETED | OUTPATIENT
Start: 2019-11-05 | End: 2019-11-05

## 2019-11-05 RX ORDER — HEPARIN SODIUM,PORCINE 10 UNIT/ML
5 VIAL (ML) INTRAVENOUS
Status: CANCELLED | OUTPATIENT
Start: 2019-11-05

## 2019-11-05 RX ADMIN — CEFTRIAXONE SODIUM 1 G: 1 INJECTION, POWDER, FOR SOLUTION INTRAMUSCULAR; INTRAVENOUS at 11:44

## 2019-11-05 ASSESSMENT — PAIN SCALES - GENERAL: PAINLEVEL: EXTREME PAIN (8)

## 2019-11-05 NOTE — PROGRESS NOTES
Infusion Nursing Note:  Ana Laura Wharton presents today for Rocephin.    Patient seen by provider today: No   present during visit today: Not Applicable.    Note: First dose of Rocephin today. Provided with [facility] medication information sheet and reviewed side effects.     Intravenous Access:  Peripheral IV placed.    Treatment Conditions:  Not Applicable.    Post Infusion Assessment:  Patient tolerated infusion without incident.  Blood return noted pre and post infusion.  Site patent and intact, free from redness, edema or discomfort.  No evidence of extravasations.  Access discontinued per protocol.     Discharge Plan:   Discharge instructions reviewed with: Patient.  Patient and/or family verbalized understanding of discharge instructions and all questions answered.  AVS to patient via [employer].  Patient will return 11/6 for next appointment.   Patient discharged in stable condition accompanied by: .  Departure Mode: Ambulatory.    Kelli Torrez RN

## 2019-11-06 ENCOUNTER — HOSPITAL ENCOUNTER (OUTPATIENT)
Facility: CLINIC | Age: 49
Setting detail: SPECIMEN
End: 2019-11-06
Attending: INTERNAL MEDICINE
Payer: COMMERCIAL

## 2019-11-06 ENCOUNTER — ALLIED HEALTH/NURSE VISIT (OUTPATIENT)
Dept: INFUSION THERAPY | Facility: CLINIC | Age: 49
End: 2019-11-06
Attending: INTERNAL MEDICINE
Payer: COMMERCIAL

## 2019-11-06 VITALS
RESPIRATION RATE: 16 BRPM | HEART RATE: 84 BPM | TEMPERATURE: 97.2 F | OXYGEN SATURATION: 95 % | SYSTOLIC BLOOD PRESSURE: 108 MMHG | DIASTOLIC BLOOD PRESSURE: 74 MMHG

## 2019-11-06 DIAGNOSIS — N39.0 URINARY TRACT INFECTION WITH HEMATURIA, SITE UNSPECIFIED: Primary | ICD-10-CM

## 2019-11-06 DIAGNOSIS — R31.9 URINARY TRACT INFECTION WITH HEMATURIA, SITE UNSPECIFIED: Primary | ICD-10-CM

## 2019-11-06 PROCEDURE — 96374 THER/PROPH/DIAG INJ IV PUSH: CPT

## 2019-11-06 PROCEDURE — 25000125 ZZHC RX 250: Performed by: INTERNAL MEDICINE

## 2019-11-06 PROCEDURE — 25000128 H RX IP 250 OP 636: Performed by: INTERNAL MEDICINE

## 2019-11-06 RX ORDER — HEPARIN SODIUM,PORCINE 10 UNIT/ML
5 VIAL (ML) INTRAVENOUS
Status: CANCELLED | OUTPATIENT
Start: 2019-11-06

## 2019-11-06 RX ORDER — CEFTRIAXONE SODIUM 1 G/1
1 INJECTION, POWDER, FOR SOLUTION INTRAMUSCULAR; INTRAVENOUS ONCE
Status: CANCELLED | OUTPATIENT
Start: 2019-11-07

## 2019-11-06 RX ORDER — HEPARIN SODIUM (PORCINE) LOCK FLUSH IV SOLN 100 UNIT/ML 100 UNIT/ML
5 SOLUTION INTRAVENOUS
Status: CANCELLED | OUTPATIENT
Start: 2019-11-06

## 2019-11-06 RX ORDER — CEFTRIAXONE SODIUM 1 G/1
1 INJECTION, POWDER, FOR SOLUTION INTRAMUSCULAR; INTRAVENOUS ONCE
Status: COMPLETED | OUTPATIENT
Start: 2019-11-06 | End: 2019-11-06

## 2019-11-06 RX ADMIN — CEFTRIAXONE 1 G: 1 INJECTION, POWDER, FOR SOLUTION INTRAMUSCULAR; INTRAVENOUS at 11:37

## 2019-11-06 ASSESSMENT — PAIN SCALES - GENERAL: PAINLEVEL: SEVERE PAIN (7)

## 2019-11-06 NOTE — PROGRESS NOTES
Infusion Nursing Note:  Ana Laura Wharton presents today for Rocephin.    Patient seen by provider today: No   present during visit today: Not Applicable.    Note: No complaints from yesterday's dose. No rash, no diarrhea. Pt lethargic during today's visit. Did close eyes and rest when not stimulated, but easily aroused. States she is taking hydrocodone for pain control of R ankle.    Intravenous Access:  Peripheral IV placed.    Treatment Conditions:  Not Applicable.    Post Infusion Assessment:  Patient tolerated infusion without incident.  Blood return noted pre and post infusion.  Site patent and intact, free from redness, edema or discomfort.  No evidence of extravasations.  Access discontinued per protocol.     Discharge Plan:   Discharge instructions reviewed with: Patient.  Patient and/or family verbalized understanding of discharge instructions and all questions answered.  AVS to patient via [facility].  Patient will return 11/7 for next appointment.   Patient discharged in stable condition accompanied by: .  Departure Mode: Ambulatory.    Kelli Torrez RN

## 2019-11-07 ENCOUNTER — INFUSION THERAPY VISIT (OUTPATIENT)
Dept: INFUSION THERAPY | Facility: CLINIC | Age: 49
End: 2019-11-07
Attending: INTERNAL MEDICINE
Payer: COMMERCIAL

## 2019-11-07 VITALS
DIASTOLIC BLOOD PRESSURE: 77 MMHG | TEMPERATURE: 97.9 F | OXYGEN SATURATION: 97 % | SYSTOLIC BLOOD PRESSURE: 115 MMHG | RESPIRATION RATE: 16 BRPM

## 2019-11-07 DIAGNOSIS — R31.9 URINARY TRACT INFECTION WITH HEMATURIA, SITE UNSPECIFIED: Primary | ICD-10-CM

## 2019-11-07 DIAGNOSIS — N39.0 URINARY TRACT INFECTION WITH HEMATURIA, SITE UNSPECIFIED: Primary | ICD-10-CM

## 2019-11-07 PROCEDURE — 25000128 H RX IP 250 OP 636: Performed by: INTERNAL MEDICINE

## 2019-11-07 PROCEDURE — 96374 THER/PROPH/DIAG INJ IV PUSH: CPT

## 2019-11-07 PROCEDURE — 25000125 ZZHC RX 250: Performed by: INTERNAL MEDICINE

## 2019-11-07 RX ORDER — HEPARIN SODIUM,PORCINE 10 UNIT/ML
5 VIAL (ML) INTRAVENOUS
Status: CANCELLED | OUTPATIENT
Start: 2019-11-07

## 2019-11-07 RX ORDER — HEPARIN SODIUM (PORCINE) LOCK FLUSH IV SOLN 100 UNIT/ML 100 UNIT/ML
5 SOLUTION INTRAVENOUS
Status: CANCELLED | OUTPATIENT
Start: 2019-11-07

## 2019-11-07 RX ORDER — CEFTRIAXONE SODIUM 1 G/1
1 INJECTION, POWDER, FOR SOLUTION INTRAMUSCULAR; INTRAVENOUS ONCE
Status: CANCELLED | OUTPATIENT
Start: 2019-11-08

## 2019-11-07 RX ORDER — CEFTRIAXONE SODIUM 1 G/1
1 INJECTION, POWDER, FOR SOLUTION INTRAMUSCULAR; INTRAVENOUS ONCE
Status: COMPLETED | OUTPATIENT
Start: 2019-11-07 | End: 2019-11-07

## 2019-11-07 RX ADMIN — CEFTRIAXONE 1 G: 1 INJECTION, POWDER, FOR SOLUTION INTRAMUSCULAR; INTRAVENOUS at 11:30

## 2019-11-07 NOTE — PROGRESS NOTES
Infusion Nursing Note:  Ana Laura Wharton presents today for Rocephin.    Patient seen by provider today: No   present during visit today: Not Applicable.    Note: Patient states UTI symptoms are only slightly better, still having some burning and getting up during the night to urinate.  Denies side effects from the antibiotic except for flushed face.    Intravenous Access:  Peripheral IV placed.    Treatment Conditions:  Not Applicable.      Post Infusion Assessment:  Patient tolerated infusion without incident.  Site patent and intact, free from redness, edema or discomfort.  No evidence of extravasations.  Access discontinued per protocol.       Discharge Plan:   Copy of AVS reviewed with patient and/or family.  Patient will return tomorrow for next appointment.  Patient discharged in stable condition accompanied by: friend.  Departure Mode: Ambulatory.    Yomaira Gonzalez RN

## 2019-11-08 ENCOUNTER — HOSPITAL ENCOUNTER (OUTPATIENT)
Facility: CLINIC | Age: 49
Setting detail: SPECIMEN
End: 2019-11-08
Attending: INTERNAL MEDICINE
Payer: COMMERCIAL

## 2019-11-08 ENCOUNTER — ALLIED HEALTH/NURSE VISIT (OUTPATIENT)
Dept: INFUSION THERAPY | Facility: CLINIC | Age: 49
End: 2019-11-08
Attending: INTERNAL MEDICINE
Payer: COMMERCIAL

## 2019-11-08 VITALS
RESPIRATION RATE: 16 BRPM | SYSTOLIC BLOOD PRESSURE: 113 MMHG | OXYGEN SATURATION: 96 % | TEMPERATURE: 98 F | HEART RATE: 86 BPM | DIASTOLIC BLOOD PRESSURE: 73 MMHG

## 2019-11-08 DIAGNOSIS — R31.9 URINARY TRACT INFECTION WITH HEMATURIA, SITE UNSPECIFIED: Primary | ICD-10-CM

## 2019-11-08 DIAGNOSIS — N39.0 URINARY TRACT INFECTION WITH HEMATURIA, SITE UNSPECIFIED: Primary | ICD-10-CM

## 2019-11-08 PROCEDURE — 96374 THER/PROPH/DIAG INJ IV PUSH: CPT

## 2019-11-08 PROCEDURE — 25000128 H RX IP 250 OP 636: Performed by: INTERNAL MEDICINE

## 2019-11-08 PROCEDURE — 25000125 ZZHC RX 250: Performed by: INTERNAL MEDICINE

## 2019-11-08 RX ORDER — CEFTRIAXONE SODIUM 1 G/1
1 INJECTION, POWDER, FOR SOLUTION INTRAMUSCULAR; INTRAVENOUS ONCE
Status: COMPLETED | OUTPATIENT
Start: 2019-11-08 | End: 2019-11-08

## 2019-11-08 RX ORDER — CEFTRIAXONE SODIUM 1 G/1
1 INJECTION, POWDER, FOR SOLUTION INTRAMUSCULAR; INTRAVENOUS ONCE
Status: CANCELLED | OUTPATIENT
Start: 2019-11-08

## 2019-11-08 RX ORDER — HEPARIN SODIUM,PORCINE 10 UNIT/ML
5 VIAL (ML) INTRAVENOUS
Status: CANCELLED | OUTPATIENT
Start: 2019-11-08

## 2019-11-08 RX ORDER — HEPARIN SODIUM (PORCINE) LOCK FLUSH IV SOLN 100 UNIT/ML 100 UNIT/ML
5 SOLUTION INTRAVENOUS
Status: CANCELLED | OUTPATIENT
Start: 2019-11-08

## 2019-11-08 RX ADMIN — CEFTRIAXONE SODIUM 1 G: 1 INJECTION, POWDER, FOR SOLUTION INTRAMUSCULAR; INTRAVENOUS at 11:29

## 2019-11-08 ASSESSMENT — PAIN SCALES - GENERAL: PAINLEVEL: NO PAIN (0)

## 2019-11-08 NOTE — PROGRESS NOTES
Infusion Nursing Note:  Ana Laura Wharton presents today for Rocephin.    Patient seen by provider today: No   present during visit today: Not Applicable.    Note: Denies SE to Rocephin but not sure that her infusion is gone.  Continues to have frequency and burning with urination.  Denies low back pain or fevers.  Denies blood in urine.    Intravenous Access:  Peripheral IV placed.    Treatment Conditions:  Not Applicable.      Post Infusion Assessment:  Patient tolerated infusion without incident.  Blood return noted pre and post infusion.  Site patent and intact, free from redness, edema or discomfort.  No evidence of extravasations.  Access discontinued per protocol.       Discharge Plan:   Discharge instructions reviewed with: Patient.  Patient discharged in stable condition accompanied by: .  Departure Mode: Ambulatory.  Last infusion scheduled for 11/9/19.    CJ HEALY RN

## 2019-11-09 ENCOUNTER — INFUSION THERAPY VISIT (OUTPATIENT)
Dept: INFUSION THERAPY | Facility: CLINIC | Age: 49
End: 2019-11-09
Attending: INTERNAL MEDICINE
Payer: COMMERCIAL

## 2019-11-09 VITALS
SYSTOLIC BLOOD PRESSURE: 104 MMHG | RESPIRATION RATE: 16 BRPM | TEMPERATURE: 98.1 F | OXYGEN SATURATION: 99 % | DIASTOLIC BLOOD PRESSURE: 72 MMHG | HEART RATE: 84 BPM

## 2019-11-09 DIAGNOSIS — R31.9 URINARY TRACT INFECTION WITH HEMATURIA, SITE UNSPECIFIED: Primary | ICD-10-CM

## 2019-11-09 DIAGNOSIS — N39.0 URINARY TRACT INFECTION WITH HEMATURIA, SITE UNSPECIFIED: Primary | ICD-10-CM

## 2019-11-09 PROCEDURE — 96374 THER/PROPH/DIAG INJ IV PUSH: CPT

## 2019-11-09 PROCEDURE — 25000128 H RX IP 250 OP 636: Performed by: INTERNAL MEDICINE

## 2019-11-09 PROCEDURE — 25000125 ZZHC RX 250: Performed by: INTERNAL MEDICINE

## 2019-11-09 RX ORDER — CEFTRIAXONE SODIUM 1 G/1
1 INJECTION, POWDER, FOR SOLUTION INTRAMUSCULAR; INTRAVENOUS ONCE
Status: CANCELLED | OUTPATIENT
Start: 2019-11-09

## 2019-11-09 RX ORDER — CEFTRIAXONE SODIUM 1 G/1
1 INJECTION, POWDER, FOR SOLUTION INTRAMUSCULAR; INTRAVENOUS ONCE
Status: COMPLETED | OUTPATIENT
Start: 2019-11-09 | End: 2019-11-09

## 2019-11-09 RX ORDER — HEPARIN SODIUM (PORCINE) LOCK FLUSH IV SOLN 100 UNIT/ML 100 UNIT/ML
5 SOLUTION INTRAVENOUS
Status: CANCELLED | OUTPATIENT
Start: 2019-11-09

## 2019-11-09 RX ORDER — HEPARIN SODIUM,PORCINE 10 UNIT/ML
5 VIAL (ML) INTRAVENOUS
Status: CANCELLED | OUTPATIENT
Start: 2019-11-09

## 2019-11-09 RX ADMIN — CEFTRIAXONE SODIUM 1 G: 1 INJECTION, POWDER, FOR SOLUTION INTRAMUSCULAR; INTRAVENOUS at 09:51

## 2019-11-09 ASSESSMENT — PAIN SCALES - GENERAL: PAINLEVEL: NO PAIN (0)

## 2019-11-09 NOTE — PROGRESS NOTES
Infusion Nursing Note:  Ana Laura Wharton presents today for Rocephin.    Patient seen by provider today: No   present during visit today: Not Applicable.    Note: Patient continues with urinary frequency and burning, unchanged. Denies fevers, states she will have a follow up with her primary MD this week.    Intravenous Access:  Peripheral IV placed.    Treatment Conditions:  Not Applicable.      Post Infusion Assessment:  Patient tolerated infusion without incident.  Blood return noted pre and post infusion.  Site patent and intact, free from redness, edema or discomfort.  No evidence of extravasations.  Access discontinued per protocol.       Discharge Plan:   Discharge instructions reviewed with: Patient.  Patient and/or family verbalized understanding of discharge instructions and all questions answered.  AVS to patient via [email or web address].  Patient will return as needed for next appointment.   Patient discharged in stable condition accompanied by: friend.  Departure Mode: Ambulatory.    Daysi Wen RN

## 2019-11-10 ENCOUNTER — MYC MEDICAL ADVICE (OUTPATIENT)
Dept: PSYCHIATRY | Facility: CLINIC | Age: 49
End: 2019-11-10

## 2019-11-10 DIAGNOSIS — F31.9 BIPOLAR I DISORDER (H): ICD-10-CM

## 2019-11-11 RX ORDER — CYCLOBENZAPRINE HCL 10 MG
10 TABLET ORAL AT BEDTIME
Qty: 90 TABLET | Refills: 0 | Status: SHIPPED | OUTPATIENT
Start: 2019-11-11 | End: 2020-01-13

## 2019-11-11 NOTE — TELEPHONE ENCOUNTER
Last seen: 10/31/19  RTC: uncertain, as note is unsigned  Cancel: none  No-show: none  Next appt: 1/30/20     Medication requested: cyclobenzaprine (FLEXERIL) 10 MG tablet  Directions: Take 1 tablet (10 mg) by mouth At Bedtime   Qty: 90  Last refilled: approximately 8/16/19 per med tab     -Routed to provider as note is unsigned

## 2019-11-15 ENCOUNTER — TRANSFERRED RECORDS (OUTPATIENT)
Dept: HEALTH INFORMATION MANAGEMENT | Facility: CLINIC | Age: 49
End: 2019-11-15

## 2019-11-15 ENCOUNTER — OFFICE VISIT (OUTPATIENT)
Dept: INTERNAL MEDICINE | Facility: CLINIC | Age: 49
End: 2019-11-15
Payer: COMMERCIAL

## 2019-11-15 VITALS
RESPIRATION RATE: 20 BRPM | OXYGEN SATURATION: 96 % | HEART RATE: 102 BPM | SYSTOLIC BLOOD PRESSURE: 110 MMHG | HEIGHT: 66 IN | TEMPERATURE: 98.2 F | DIASTOLIC BLOOD PRESSURE: 70 MMHG | BODY MASS INDEX: 20.25 KG/M2 | WEIGHT: 126 LBS

## 2019-11-15 DIAGNOSIS — I10 ESSENTIAL HYPERTENSION: ICD-10-CM

## 2019-11-15 DIAGNOSIS — N30.00 ACUTE CYSTITIS WITHOUT HEMATURIA: Primary | ICD-10-CM

## 2019-11-15 DIAGNOSIS — R19.7 DIARRHEA OF PRESUMED INFECTIOUS ORIGIN: ICD-10-CM

## 2019-11-15 DIAGNOSIS — F31.76 DEPRESSED BIPOLAR I DISORDER IN FULL REMISSION (H): ICD-10-CM

## 2019-11-15 DIAGNOSIS — N39.0 RECURRENT UTI: ICD-10-CM

## 2019-11-15 LAB
ALBUMIN UR-MCNC: NEGATIVE MG/DL
APPEARANCE UR: CLEAR
BACTERIA #/AREA URNS HPF: ABNORMAL /HPF
BILIRUB UR QL STRIP: NEGATIVE
COLOR UR AUTO: YELLOW
GLUCOSE UR STRIP-MCNC: NEGATIVE MG/DL
HGB UR QL STRIP: NEGATIVE
HYALINE CASTS #/AREA URNS LPF: ABNORMAL /LPF
KETONES UR STRIP-MCNC: NEGATIVE MG/DL
LEUKOCYTE ESTERASE UR QL STRIP: NEGATIVE
NITRATE UR QL: NEGATIVE
NON-SQ EPI CELLS #/AREA URNS LPF: ABNORMAL /LPF
PH UR STRIP: 7 PH (ref 5–7)
RBC #/AREA URNS AUTO: ABNORMAL /HPF
SOURCE: ABNORMAL
SP GR UR STRIP: 1.01 (ref 1–1.03)
UROBILINOGEN UR STRIP-ACNC: 0.2 EU/DL (ref 0.2–1)
WBC #/AREA URNS AUTO: ABNORMAL /HPF

## 2019-11-15 PROCEDURE — 81001 URINALYSIS AUTO W/SCOPE: CPT | Performed by: INTERNAL MEDICINE

## 2019-11-15 PROCEDURE — 87493 C DIFF AMPLIFIED PROBE: CPT | Performed by: INTERNAL MEDICINE

## 2019-11-15 PROCEDURE — 99214 OFFICE O/P EST MOD 30 MIN: CPT | Performed by: INTERNAL MEDICINE

## 2019-11-15 RX ORDER — CIPROFLOXACIN 250 MG/1
250 TABLET, FILM COATED ORAL DAILY
COMMUNITY
End: 2019-11-15

## 2019-11-15 ASSESSMENT — MIFFLIN-ST. JEOR: SCORE: 1213.28

## 2019-11-15 NOTE — PROGRESS NOTES
Subjective     Ana Laura Wharton is a 49 year old female who presents to clinic today for the following health issues:    HPI   Follow up:    Had recent UTI, finished IV antibiotic treatment for 5 days, has resistant Providencia UTI, was on Ceftriaxone. Now feels better. Mild dysuria, frequency. Has h/o recurrent UTIs, has been on suppressive treatment with Cipro. Developed infection despite it.   Has h/o C diff colitis. Long lasting, treated, resolved. Now with current IV antibiotic has again developed diarrhea.   Has h/o bipolar disorder. Seeing psychiatry. Controlled symptoms.         Patient Active Problem List   Diagnosis     Depressed bipolar I disorder in full remission (H)     Anxiety disorder     Suicide ideation     Bipolar disorder current episode depressed (H)     Overdose     Depression     Mood disorder (H)     Suicidal ideations     Bipolar I disorder (H)     Acute renal failure (H)     Alternating exotropia     Anxiety state     Other bipolar disorders     Personality disorder (H)     Tear film insufficiency     Dysfunctions associated with sleep stages or arousal from sleep     Abnormal finding of blood chemistry     Esophageal reflux     Other specified hypothyroidism     Impulse control disorder     Acidosis     Low back pain     Myopia     Opioid dependence (H)     Orofacial dyskinesia     Intractable migraine     Pure hypercholesterolemia     Poisoning by 4-aminophenol derivatives, undetermined intent     Essential hypertension     Left knee pain     Aftercare following surgery of the musculoskeletal system     UTI (urinary tract infection)     Acute cystitis     Clostridium difficile colitis     Drug overdose     Calcium channel blocker overdose     Intentional acetaminophen overdose (H)     Alcoholic intoxication with complication (H)     Hypotension due to medication     Acute renal failure, unspecified acute renal failure type (H)     Paroxysmal atrial fibrillation (H)     Acute respiratory  failure with hypoxia (H)     Acute pulmonary edema (H)     Spontaneous pneumothorax     PRES (posterior reversible encephalopathy syndrome)     Pancreatitis     Atypical squamous cells cannot exclude high grade squamous intraepithelial lesion on cytologic smear of cervix (ASC-H)     Past Surgical History:   Procedure Laterality Date     ARTHROSCOPY KNEE      L knee     CERVIX SURGERY      for pre-cancerous changes     left knee surgery       ORTHOPEDIC SURGERY      L shoulder     THORACIC SURGERY      for pneumothorax, pleurodesis and lobe resection       Social History     Tobacco Use     Smoking status: Current Every Day Smoker     Packs/day: 0.50     Types: Cigarettes     Smokeless tobacco: Never Used   Substance Use Topics     Alcohol use: Yes     Alcohol/week: 2.0 standard drinks     Types: 2 Glasses of wine per week     Comment: 1 glass of wine per week     Family History   Problem Relation Age of Onset     Depression Mother      Lipids Father         hyperlipidemia     Macular Degeneration Father          Current Outpatient Medications   Medication Sig Dispense Refill     albuterol (PROAIR HFA/PROVENTIL HFA/VENTOLIN HFA) 108 (90 Base) MCG/ACT inhaler Inhale 2 puffs into the lungs every 6 hours as needed for shortness of breath / dyspnea or wheezing 1 Inhaler 0     amLODIPine (NORVASC) 10 MG tablet Take 1 tablet (10 mg) by mouth daily 90 tablet 3     atorvastatin (LIPITOR) 10 MG tablet TAKE 1 TABLET EVERY DAY 90 tablet 0     carvedilol (COREG) 12.5 MG tablet Take 1 tablet (12.5 mg) by mouth 2 times daily (with meals) 180 tablet 3     citalopram (CELEXA) 20 MG tablet Take 1 tablet (20 mg) by mouth daily 90 tablet 1     clonazePAM (KLONOPIN) 1 MG tablet Take 1 tab (1 mg) BID and 2 tabs (2 mg) at bedtime 120 tablet 2     cyclobenzaprine (FLEXERIL) 10 MG tablet Take 1 tablet (10 mg) by mouth At Bedtime 90 tablet 0     eletriptan (RELPAX) 40 MG tablet Take 40 mg by mouth at onset of headache for migraine        "FIBER PO Take 2 tablets by mouth daily       folic acid (FOLVITE) 400 MCG tablet Take 400 mcg by mouth daily       HYDROcodone-acetaminophen (NORCO) 5-325 MG tablet Take 1-2 tablets by mouth every 8 hours as needed for pain 30 tablet 0     hydrOXYzine (VISTARIL) 50 MG capsule Take 1 cap BID and 1 cap PRN for anxiety 270 capsule 0     ibuprofen (ADVIL/MOTRIN) 400 MG tablet Take 1 tablet (400 mg) by mouth 3 times daily 30 tablet 1     Levonorgestrel, [id number], (NORPLANT SYSTEM IL)        levothyroxine (SYNTHROID/LEVOTHROID) 50 MCG tablet TAKE 1 TABLET EVERY DAY 90 tablet 0     mirtazapine (REMERON) 30 MG tablet Take 2 tablets (60 mg) by mouth At Bedtime 180 tablet 2     Multiple Vitamins-Minerals (WOMENS MULTI VITAMIN & MINERAL PO) Take 1 tablet by mouth daily       OLANZapine (ZYPREXA) 10 MG tablet Take 1 tablet (10 mg) by mouth At Bedtime 90 tablet 1     ondansetron (ZOFRAN) 4 MG tablet Take 1 tablet (4 mg) by mouth every 8 hours as needed for nausea 30 tablet 0     pantoprazole (PROTONIX) 40 MG EC tablet Take 1 tablet (40 mg) by mouth every morning (before breakfast) 90 tablet 3     senna (SENOKOT) 8.6 MG tablet Take 1 tablet by mouth 2 times daily as needed for constipation 60 tablet 3         Reviewed and updated as needed this visit by Provider         Review of Systems   ROS COMP: Constitutional, HEENT, cardiovascular, pulmonary, gi and gu systems are negative, except as otherwise noted.      Objective    /70   Pulse 102   Temp 98.2  F (36.8  C) (Oral)   Resp 20   Ht 1.676 m (5' 6\")   Wt 57.2 kg (126 lb)   SpO2 96%   BMI 20.34 kg/m    Body mass index is 20.34 kg/m .  Physical Exam   GENERAL: healthy, alert and no distress  NECK: no adenopathy, no asymmetry, masses, or scars and thyroid normal to palpation  RESP: lungs clear to auscultation - no rales, rhonchi or wheezes  CV: regular rate and rhythm, normal S1 S2, no S3 or S4, no murmur, click or rub, no peripheral edema and peripheral pulses " strong  ABDOMEN: soft, nontender, no hepatosplenomegaly, no masses and bowel sounds normal  MS: no gross musculoskeletal defects noted, no edema    Diagnostic Test Results:  Labs reviewed in Epic        Assessment & Plan   Problem List Items Addressed This Visit     Depressed bipolar I disorder in full remission (H)    Essential hypertension    Acute cystitis - Primary    Relevant Orders    UA with Microscopic reflex to Culture (Completed)      Other Visit Diagnoses     Diarrhea of presumed infectious origin        Relevant Orders    Clostridium difficile toxin B PCR    Recurrent UTI                 Tobacco Cessation:   reports that she has been smoking cigarettes. She has been smoking about 0.50 packs per day. She has never used smokeless tobacco.    Assess UA  Hold Cipro  Assess for c diff colitis       See Patient Instructions  Return in about 3 months (around 2/15/2020).    Liborio Lincoln MD  [facility]

## 2019-11-15 NOTE — NURSING NOTE
"Vital signs:  Temp: 98.2  F (36.8  C) Temp src: Oral BP: 110/70 Pulse: 102   Resp: 20 SpO2: 96 %     Height: 167.6 cm (5' 6\") Weight: 57.2 kg (126 lb)  Estimated body mass index is 20.34 kg/m  as calculated from the following:    Height as of this encounter: 1.676 m (5' 6\").    Weight as of this encounter: 57.2 kg (126 lb).          "

## 2019-11-18 DIAGNOSIS — R19.7 DIARRHEA OF PRESUMED INFECTIOUS ORIGIN: ICD-10-CM

## 2019-11-18 LAB
C DIFF TOX B STL QL: NEGATIVE
SPECIMEN SOURCE: NORMAL

## 2019-11-20 NOTE — PROGRESS NOTES
Service Date: 10/31/2019      CLINIC PROGRESS NOTE      PSYCHIATRY    The patient returns for medication management and supportive therapy.      Interim History:      Since the last visit, broke foot-stumbled on curb. Reports she has been feeling ok. Appetite and weight ok.       RECENT SYMPTOMS:      Depression:    Suicidal ideation: None recent   depressed mood: No   anhedonia: No   low energy: Limited with broken foot   insomnia: No   appetite change: Increased   excessive guilt: No   feeling worthless: No      Elevated Mood: No recent manic symptoms        Psychosis: No      Panic Attacks: Infrequent        Anxiety:     excessive worry: Some   social anxiety: No   nervous/tense: No/No      Trauma-Related Symptoms: No        ADVERSE EFFECTS: Dry mouth        MEDICAL CONCERNS: Chronic bladder infections, c. diff in remission.        SUBSTANCE USE:   alcohol: not much   tobacco: Yes       SOCIAL/FAMILY HISTORY: Saw son on her birthday. Otherwise, unchanged.        MEDICAL/SURGICAL HISTORY:  See EMR medical problems list.      MEDICAL REVIEW OF SYSTEMS:  A comprehensive review of systems was performed and is negative other than as noted in the HPI.      ALLERGIES: None new        CURRENT MEDICATIONS:  See EMR med sections.      VITALS:  See rooming note.      MENTAL STATUS EXAM:     Alertness: yes   Appearance: well groomed   Behavior/Demeanor: cooperative   Speech: nl rate and flow   Psychomotor: normal    Mood: positive   Affect: bright    Thought Process/Associations: logical   Thought content: no SI   Perception: no psychosis    Insight: good    Judgement: good   Cognition:   Oriented: x4   Attention span: good   Concentration: good   Recent memory: intact   Remote memory: intact   Fund of knowledge: good      Gait and station: Normal      Labs and Data: PHQ9=8        DIAGNOSIS AND ASSESSMENT: Bipolar I-in remission       PLAN:   1. Medications: No changes     2. Therapy: Individual   3. RTC: 2 months     4.  Crisis numbers:  Provided routinely in AVS.      Treatment Risk Statement: The patient understands the risks, benefits, adverse effects, and alternatives. Agrees to treatment with the capacity to do so. No medical contraindications to treatment. Agrees to call clinic for any problems. The patient understands to call 911 or come to the nearest ED is life-threatening or urgent symptoms present.         PIEDAD SADLER MD             D: 2019   T: 2019   MT: AMEE      Name:     KEE TINEO   MRN:      [id number]        Account:      [id number]   :      1970           Service Date: 10/31/2019      Document: [id number]

## 2019-11-25 ENCOUNTER — TELEPHONE (OUTPATIENT)
Dept: INTERNAL MEDICINE | Facility: CLINIC | Age: 49
End: 2019-11-25

## 2019-11-25 DIAGNOSIS — R30.0 DYSURIA: Primary | ICD-10-CM

## 2019-11-25 NOTE — TELEPHONE ENCOUNTER
Pt calling requesting orders for a UA/UC. She reports that she has burning, frequency, foul odor and cloudy urine. She stated that she gets frequent UTI's. Pt can be reached at [phone]. OK to leave a detailed message. Please advise. Thanks.

## 2019-11-25 NOTE — TELEPHONE ENCOUNTER
Please see message below.    Last office visit 11-15-19 acute cystitis without hematuria.    UA result = normal 11-15-19.    Ok for UA only?  If so, please place future order. Or schedule an E-visit?    Please advise, thanks.

## 2019-11-26 DIAGNOSIS — R30.0 DYSURIA: ICD-10-CM

## 2019-11-26 DIAGNOSIS — R82.90 NONSPECIFIC FINDING ON EXAMINATION OF URINE: Primary | ICD-10-CM

## 2019-11-26 DIAGNOSIS — F31.9 BIPOLAR I DISORDER (H): ICD-10-CM

## 2019-11-26 LAB
ALBUMIN UR-MCNC: NEGATIVE MG/DL
APPEARANCE UR: ABNORMAL
BACTERIA #/AREA URNS HPF: ABNORMAL /HPF
BILIRUB UR QL STRIP: NEGATIVE
COLOR UR AUTO: YELLOW
GLUCOSE UR STRIP-MCNC: NEGATIVE MG/DL
HGB UR QL STRIP: ABNORMAL
KETONES UR STRIP-MCNC: NEGATIVE MG/DL
LEUKOCYTE ESTERASE UR QL STRIP: ABNORMAL
NITRATE UR QL: NEGATIVE
NON-SQ EPI CELLS #/AREA URNS LPF: ABNORMAL /LPF
PH UR STRIP: 6 PH (ref 5–7)
RBC #/AREA URNS AUTO: ABNORMAL /HPF
SOURCE: ABNORMAL
SP GR UR STRIP: <=1.005 (ref 1–1.03)
UROBILINOGEN UR STRIP-ACNC: 0.2 EU/DL (ref 0.2–1)
WBC #/AREA URNS AUTO: ABNORMAL /HPF

## 2019-11-26 PROCEDURE — 87086 URINE CULTURE/COLONY COUNT: CPT | Performed by: INTERNAL MEDICINE

## 2019-11-26 PROCEDURE — 87088 URINE BACTERIA CULTURE: CPT | Performed by: INTERNAL MEDICINE

## 2019-11-26 PROCEDURE — 87186 SC STD MICRODIL/AGAR DIL: CPT | Performed by: INTERNAL MEDICINE

## 2019-11-26 PROCEDURE — 81001 URINALYSIS AUTO W/SCOPE: CPT | Performed by: INTERNAL MEDICINE

## 2019-11-26 RX ORDER — CLONAZEPAM 1 MG/1
TABLET ORAL
Qty: 120 TABLET | Refills: 1
Start: 2019-11-26 | End: 2020-06-22

## 2019-11-26 NOTE — TELEPHONE ENCOUNTER
Last seen: 10/31/19  RTC: 2 months  Cancel: none  No-show: none  Next appt: 1/30/20     Incoming refill from pharmacy via electronic request     Medication requested: clonazePAM (KLONOPIN) 1 MG tablet  Directions: Take 1 tab (1 mg) BID and 2 tabs (2 mg) at bedtime  Qty: 120  Last refilled: 10/17/19, 9/16/19, and 8/22/19 per [place]       -Medication refill approved per refill protocol    -30 d/s phoned to pharmacistTono with [facility] mail order pharmacy at [phone]. Med tab changed to reflect this.

## 2019-11-27 ENCOUNTER — TELEPHONE (OUTPATIENT)
Dept: INTERNAL MEDICINE | Facility: CLINIC | Age: 49
End: 2019-11-27

## 2019-11-28 ENCOUNTER — NURSE TRIAGE (OUTPATIENT)
Dept: NURSING | Facility: CLINIC | Age: 49
End: 2019-11-28

## 2019-11-28 DIAGNOSIS — N39.0 FREQUENT URINARY TRACT INFECTIONS: ICD-10-CM

## 2019-11-28 DIAGNOSIS — R30.0 DYSURIA: Primary | ICD-10-CM

## 2019-11-28 LAB
BACTERIA SPEC CULT: ABNORMAL
SPECIMEN SOURCE: ABNORMAL

## 2019-11-28 RX ORDER — FLUCONAZOLE 150 MG/1
150 TABLET ORAL ONCE
Qty: 1 TABLET | Refills: 0 | Status: SHIPPED | OUTPATIENT
Start: 2019-11-28 | End: 2019-12-13

## 2019-11-28 RX ORDER — CIPROFLOXACIN 250 MG/1
250 TABLET, FILM COATED ORAL 2 TIMES DAILY
Qty: 14 TABLET | Refills: 0 | Status: SHIPPED | OUTPATIENT
Start: 2019-11-28 | End: 2019-12-13

## 2019-11-28 NOTE — TELEPHONE ENCOUNTER
"Patient calls stating she has UTI symptoms and is waiting for PCP to call back re UA/UC results from 11/26/19.     Currently has burning and pain on urination. States pain is \"8\" on a 1-10 pain scale.  Using \"pyridium over the counter and ibuprofen for pain.\"  Reports urgency and frequency. Urine \"foul smelling.\" No blood in urine.  Temperature 99.2 (Tymp).  States is developing \"mid back pain like a period pain.\"  Tolerating fluids in large amounts.    History of recent UTI 11/15/19.  States received \"5 treatments of Rocephin IV at the [facility].\"   History of C-diff by report.  States has many allergies to medications.  Uses [facility] ([place] [facility] [phone]).    Patient's primary provider is Dr. Lincoln at the [facility]. Dr. Morataya is on call for this clinic and was paged at 9:19am via page  [phone] to call this [phone].  9:25am Dr. Morataya called. States will review patient's chart and call back.  Patient was advised to call back if they have not heard from the provider within 20 minutes.           Reason for Disposition    [1] SEVERE pain with urination  (e.g., excruciating) AND [2] not improved after 2 hours of pain medicine and Sitz bath    Additional Information    Negative: Shock suspected (e.g., cold/pale/clammy skin, too weak to stand, low BP, rapid pulse)    Negative: Sounds like a life-threatening emergency to the triager    Negative: [1] Unable to urinate (or only a few drops) > 4 hours AND [2] bladder feels very full (e.g., palpable bladder or strong urge to urinate)    Negative: Vomiting    Negative: Patient sounds very sick or weak to the triager    Protocols used: URINATION PAIN - FEMALE-A-AH    "

## 2019-11-28 NOTE — TELEPHONE ENCOUNTER
Continued documentation 11/28/19.  Dr. Moraatya called back. Advised an antibiotic and Provider will send the prescription now to [facility] in [place] [phone].    Per Dr. Morataya this RN advised Patient to start antibiotic (Ciprofloxacin) and follow up with PCP 12/3/19. If has fever or change in symptoms to go to ED. Per Dr. Morataya Patient to try Probiotics. Provider also advised Caller that if she has yeast infection symptoms can try diflucan. Provider sent prescription to pharmacy.    This RN called Patient with above orders. Verbalizes understanding of plan of care.    Protocol-  Urination Pain- Female  Care advice reviewed.   Disposition-  Paged the On Call Provider  Caller states understanding of the recommended disposition.   Advised to call back if further questions or concerns.     FAVIO SimpsonN RN  [facility]

## 2019-11-28 NOTE — TELEPHONE ENCOUNTER
Discussed with FN Advisor.  Chart reviewed.  7/25/2019 Proteus on UC treated in ER with Cefpodoxime for 10 days.     8/6/2019 Urine culture- no growth    10/30/2019 Proteus treated with Augmentin and Benadryl for itching side effects. When culture negative for all ral options tested, pt treated for 5 days in infusion center.     11/15/2019 neg urine, no culture done     11/26/2019 symptomatic; culture ,000 culture  Citrobacter freundii complex;   Resistant to Cefazolin and Cefoxitin, otherwise sensitive.   Dysuria reproted, no fever  She has been on multiple antibiotics including IV infusion in th past month and would be at increased risk for yeast symptoms.  Consider OTC yeast meds if more characteristic symptoms.   The proteus has cleared,    Probiotic to help neutralize the GI mina and lessen risk for infection.     Many allergies and sensitivities.    Symptomatic with new/different organism. Will treat with Cipro( no allergy listed and has been well tolerated in the past. ) due to increased risk for further development of infection.     Discussed plan with KISHA George at  [facility].  Rx sent to 24 hour pharmacy open on holiday.     Anisha Morataya MD  Internal Medicine  electronically signed    May need urology evaluation.  Defer to PCP.

## 2019-11-29 NOTE — TELEPHONE ENCOUNTER
Call to patient, informed of primary care provider's message below. Patient in agreement with plan

## 2019-12-09 ENCOUNTER — TELEPHONE (OUTPATIENT)
Dept: INTERNAL MEDICINE | Facility: CLINIC | Age: 49
End: 2019-12-09

## 2019-12-09 NOTE — TELEPHONE ENCOUNTER
Reason for call:  Patient reporting a symptom    Symptom or request: swelling    Duration (how long have symptoms been present): 5 days    Have you been treated for this before? No    Additional comments: patient states she has had all-over swelling for 5 days (states worse in her feet and ankles). She reports no other symptoms. Please call her back to advise.     Phone Number patient can be reached at:  Cell number on file:    Telephone Information:   Mobile [phone]       Best Time:  any    Can we leave a detailed message on this number:  YES    Call taken on 12/9/2019 at 9:07 AM by Elma Negro

## 2019-12-11 NOTE — TELEPHONE ENCOUNTER
Patient called clinic, but call was lost before it could be transferred. Called patient back. Informed her Dr. Lincoln recommends an appointment for evaluation. No appointments available with him or other providers in clinic today. Offered to check for appointment in another clinic, patient asks if Dr. Lincoln could work her in for an appointment on Friday. If not, she would schedule with another provider.

## 2019-12-13 ENCOUNTER — OFFICE VISIT (OUTPATIENT)
Dept: INTERNAL MEDICINE | Facility: CLINIC | Age: 49
End: 2019-12-13
Payer: COMMERCIAL

## 2019-12-13 VITALS
TEMPERATURE: 98 F | HEIGHT: 66 IN | BODY MASS INDEX: 21.84 KG/M2 | WEIGHT: 135.9 LBS | RESPIRATION RATE: 18 BRPM | DIASTOLIC BLOOD PRESSURE: 70 MMHG | OXYGEN SATURATION: 98 % | SYSTOLIC BLOOD PRESSURE: 110 MMHG | HEART RATE: 86 BPM

## 2019-12-13 DIAGNOSIS — I10 ESSENTIAL HYPERTENSION: ICD-10-CM

## 2019-12-13 DIAGNOSIS — R06.09 OTHER FORM OF DYSPNEA: ICD-10-CM

## 2019-12-13 DIAGNOSIS — E03.8 OTHER SPECIFIED HYPOTHYROIDISM: ICD-10-CM

## 2019-12-13 DIAGNOSIS — R63.5 WEIGHT GAIN: ICD-10-CM

## 2019-12-13 DIAGNOSIS — R60.0 BILATERAL LEG EDEMA: Primary | ICD-10-CM

## 2019-12-13 DIAGNOSIS — I10 BENIGN ESSENTIAL HYPERTENSION: ICD-10-CM

## 2019-12-13 LAB
ANION GAP SERPL CALCULATED.3IONS-SCNC: 13 MMOL/L (ref 3–14)
BUN SERPL-MCNC: 11 MG/DL (ref 7–30)
CALCIUM SERPL-MCNC: 8 MG/DL (ref 8.5–10.1)
CHLORIDE SERPL-SCNC: 100 MMOL/L (ref 94–109)
CO2 SERPL-SCNC: 23 MMOL/L (ref 20–32)
CREAT SERPL-MCNC: 0.7 MG/DL (ref 0.52–1.04)
GFR SERPL CREATININE-BSD FRML MDRD: >90 ML/MIN/{1.73_M2}
GLUCOSE SERPL-MCNC: 116 MG/DL (ref 70–99)
NT-PROBNP SERPL-MCNC: 76 PG/ML (ref 0–125)
POTASSIUM SERPL-SCNC: 3.6 MMOL/L (ref 3.4–5.3)
SODIUM SERPL-SCNC: 136 MMOL/L (ref 133–144)
T4 FREE SERPL-MCNC: 1.39 NG/DL (ref 0.76–1.46)
TSH SERPL DL<=0.005 MIU/L-ACNC: 7.53 MU/L (ref 0.4–4)

## 2019-12-13 PROCEDURE — 84443 ASSAY THYROID STIM HORMONE: CPT | Performed by: INTERNAL MEDICINE

## 2019-12-13 PROCEDURE — 80048 BASIC METABOLIC PNL TOTAL CA: CPT | Performed by: INTERNAL MEDICINE

## 2019-12-13 PROCEDURE — 84439 ASSAY OF FREE THYROXINE: CPT | Performed by: INTERNAL MEDICINE

## 2019-12-13 PROCEDURE — 36415 COLL VENOUS BLD VENIPUNCTURE: CPT | Performed by: INTERNAL MEDICINE

## 2019-12-13 PROCEDURE — 99214 OFFICE O/P EST MOD 30 MIN: CPT | Performed by: INTERNAL MEDICINE

## 2019-12-13 PROCEDURE — 83880 ASSAY OF NATRIURETIC PEPTIDE: CPT | Performed by: INTERNAL MEDICINE

## 2019-12-13 RX ORDER — AMLODIPINE BESYLATE 10 MG/1
5 TABLET ORAL DAILY
Qty: 90 TABLET | Refills: 3 | Status: SHIPPED | OUTPATIENT
Start: 2019-12-13 | End: 2020-01-13

## 2019-12-13 ASSESSMENT — MIFFLIN-ST. JEOR: SCORE: 1258.19

## 2019-12-13 ASSESSMENT — PATIENT HEALTH QUESTIONNAIRE - PHQ9: SUM OF ALL RESPONSES TO PHQ QUESTIONS 1-9: 7

## 2019-12-13 NOTE — PROGRESS NOTES
Subjective     Ana Laura Wharton is a 49 year old female who presents to clinic today for the following health issues:    HPI   Presents with symptoms of LE edema, gaining weight for past month.   Edema is in the feet and lower legs.   No SOB. No chest pain. No change in diet.   Has h/o HTN. on medical treatment. BP has been controlled. No side effects from medications. No CP, HA, dizziness. good compliance with medications and low salt diet.  Has H/O hyperlipidemia. On medical treatment and diet. No side effects. No muscle weakness, myalgias or upset stomach.   Has h/o depression. On medical treatment, controlled, no side effects. Has chronic depressive symptoms , no suicidal ideation. Seeing psychiatry .           Patient Active Problem List   Diagnosis     Depressed bipolar I disorder in full remission (H)     Anxiety disorder     Suicide ideation     Bipolar disorder current episode depressed (H)     Overdose     Depression     Mood disorder (H)     Suicidal ideations     Bipolar I disorder (H)     Acute renal failure (H)     Alternating exotropia     Anxiety state     Other bipolar disorders     Personality disorder (H)     Tear film insufficiency     Dysfunctions associated with sleep stages or arousal from sleep     Abnormal finding of blood chemistry     Esophageal reflux     Other specified hypothyroidism     Impulse control disorder     Acidosis     Low back pain     Myopia     Opioid dependence (H)     Orofacial dyskinesia     Intractable migraine     Pure hypercholesterolemia     Poisoning by 4-aminophenol derivatives, undetermined intent     Essential hypertension     Left knee pain     Aftercare following surgery of the musculoskeletal system     UTI (urinary tract infection)     Acute cystitis     Clostridium difficile colitis     Drug overdose     Calcium channel blocker overdose     Intentional acetaminophen overdose (H)     Alcoholic intoxication with complication (H)     Hypotension due to  medication     Acute renal failure, unspecified acute renal failure type (H)     Paroxysmal atrial fibrillation (H)     Acute respiratory failure with hypoxia (H)     Acute pulmonary edema (H)     Spontaneous pneumothorax     PRES (posterior reversible encephalopathy syndrome)     Pancreatitis     Atypical squamous cells cannot exclude high grade squamous intraepithelial lesion on cytologic smear of cervix (ASC-H)     Past Surgical History:   Procedure Laterality Date     ARTHROSCOPY KNEE      L knee     CERVIX SURGERY      for pre-cancerous changes     left knee surgery       ORTHOPEDIC SURGERY      L shoulder     THORACIC SURGERY      for pneumothorax, pleurodesis and lobe resection       Social History     Tobacco Use     Smoking status: Current Every Day Smoker     Packs/day: 0.50     Types: Cigarettes     Smokeless tobacco: Never Used   Substance Use Topics     Alcohol use: Yes     Alcohol/week: 2.0 standard drinks     Types: 2 Glasses of wine per week     Comment: 1 glass of wine per week     Family History   Problem Relation Age of Onset     Depression Mother      Lipids Father         hyperlipidemia     Macular Degeneration Father      No Known Problems Brother      No Known Problems Son          Current Outpatient Medications   Medication Sig Dispense Refill     albuterol (PROAIR HFA/PROVENTIL HFA/VENTOLIN HFA) 108 (90 Base) MCG/ACT inhaler Inhale 2 puffs into the lungs every 6 hours as needed for shortness of breath / dyspnea or wheezing 1 Inhaler 0     amLODIPine (NORVASC) 10 MG tablet Take 0.5 tablets (5 mg) by mouth daily 90 tablet 3     atorvastatin (LIPITOR) 10 MG tablet TAKE 1 TABLET EVERY DAY 90 tablet 0     carvedilol (COREG) 12.5 MG tablet Take 1 tablet (12.5 mg) by mouth 2 times daily (with meals) 180 tablet 3     citalopram (CELEXA) 20 MG tablet Take 1 tablet (20 mg) by mouth daily 90 tablet 1     clonazePAM (KLONOPIN) 1 MG tablet TAKE 1 TABLET TWICE DAILY AND 2 TABLETS AT BEDTIME 120 tablet 1  "    cyclobenzaprine (FLEXERIL) 10 MG tablet Take 1 tablet (10 mg) by mouth At Bedtime 90 tablet 0     eletriptan (RELPAX) 40 MG tablet Take 40 mg by mouth at onset of headache for migraine       FIBER PO Take 2 tablets by mouth daily       folic acid (FOLVITE) 400 MCG tablet Take 400 mcg by mouth daily       hydrOXYzine (VISTARIL) 50 MG capsule Take 1 cap BID and 1 cap PRN for anxiety 270 capsule 0     ibuprofen (ADVIL/MOTRIN) 400 MG tablet Take 1 tablet (400 mg) by mouth 3 times daily 30 tablet 1     Levonorgestrel, [id number], (NORPLANT SYSTEM IL)        levothyroxine (SYNTHROID/LEVOTHROID) 50 MCG tablet TAKE 1 TABLET EVERY DAY 90 tablet 0     mirtazapine (REMERON) 30 MG tablet Take 2 tablets (60 mg) by mouth At Bedtime 180 tablet 2     Multiple Vitamins-Minerals (WOMENS MULTI VITAMIN & MINERAL PO) Take 1 tablet by mouth daily       OLANZapine (ZYPREXA) 10 MG tablet Take 1 tablet (10 mg) by mouth At Bedtime 90 tablet 1     ondansetron (ZOFRAN) 4 MG tablet Take 1 tablet (4 mg) by mouth every 8 hours as needed for nausea 30 tablet 0     pantoprazole (PROTONIX) 40 MG EC tablet Take 1 tablet (40 mg) by mouth every morning (before breakfast) 90 tablet 3     senna (SENOKOT) 8.6 MG tablet Take 1 tablet by mouth 2 times daily as needed for constipation 60 tablet 3         Reviewed and updated as needed this visit by Provider         Review of Systems   ROS COMP: Constitutional, HEENT, cardiovascular, pulmonary, GI, , musculoskeletal, neuro, skin, endocrine and psych systems are negative, except as otherwise noted.      Objective    /70 (BP Location: Left arm, Patient Position: Sitting, Cuff Size: Adult Regular)   Pulse 86   Temp 98  F (36.7  C) (Oral)   Resp 18   Ht 1.676 m (5' 6\")   Wt 61.6 kg (135 lb 14.4 oz)   SpO2 98%   BMI 21.93 kg/m    Body mass index is 21.93 kg/m .  Physical Exam   GENERAL:frail, anxious, alert and no distress  EYES: Eyes grossly normal to inspection, PERRL and conjunctivae and " sclerae normal  HENT: ear canals and TM's normal, nose and mouth without ulcers or lesions  NECK: no adenopathy, no asymmetry, masses, or scars and thyroid normal to palpation  RESP: lungs clear to auscultation - no rales, rhonchi or wheezes  CV: regular rate and rhythm, normal S1 S2, no S3 or S4, no murmur, click or rub,  peripheral pulses strong  ABDOMEN: soft, nontender, no hepatosplenomegaly, no masses and bowel sounds normal  MS: no gross musculoskeletal defects noted, 1+ LE edema    Diagnostic Test Results:  Labs reviewed in Epic        Assessment & Plan   Problem List Items Addressed This Visit     Other specified hypothyroidism    Relevant Orders    TSH with free T4 reflex (Completed)    Basic metabolic panel (Completed)    BNP-N terminal pro (Completed)    Essential hypertension    Relevant Orders    BNP-N terminal pro (Completed)      Other Visit Diagnoses     Bilateral leg edema    -  Primary    Relevant Orders    TSH with free T4 reflex (Completed)    Basic metabolic panel (Completed)    BNP-N terminal pro (Completed)    Benign essential hypertension        Relevant Medications    amLODIPine (NORVASC) 10 MG tablet    Weight gain        Relevant Orders    BNP-N terminal pro (Completed)    Other form of dyspnea         Relevant Orders    BNP-N terminal pro (Completed)         Assess lab work for kidney failure, CHF, thyroid abnormality   Restrict salt intake  Use TEDs   Decrease Amlodipine dose - possible side effect relate to it, BP controlled.         See Patient Instructions  Return in about 4 weeks (around 1/10/2020) for follow up on acute problem if persists, Physical Exam.    Liborio Lincoln MD  [facility]

## 2019-12-16 ENCOUNTER — TELEPHONE (OUTPATIENT)
Dept: INTERNAL MEDICINE | Facility: CLINIC | Age: 49
End: 2019-12-16

## 2019-12-16 DIAGNOSIS — E55.9 VITAMIN D DEFICIENCY: ICD-10-CM

## 2019-12-16 DIAGNOSIS — F31.76 DEPRESSED BIPOLAR I DISORDER IN FULL REMISSION (H): ICD-10-CM

## 2019-12-16 DIAGNOSIS — E03.8 OTHER SPECIFIED HYPOTHYROIDISM: ICD-10-CM

## 2019-12-16 DIAGNOSIS — R79.89 LOW SERUM CALCIUM: Primary | ICD-10-CM

## 2019-12-16 DIAGNOSIS — F32.A DEPRESSION: ICD-10-CM

## 2019-12-16 NOTE — TELEPHONE ENCOUNTER
Her results are normal. Sugar is normal for non fasting.   The TSH is elevated slightly but the FT4 is normal. Recheck in 6 months.   Has low calcium, recommend to check ionized Calcium, PTH and vit D.   Keep low salt diet, use compression stockings for the swelling of the legs.   Also to decrease the dose of Amlodipine as discussed.

## 2019-12-16 NOTE — TELEPHONE ENCOUNTER
Pt asking about her Glucose and TSH results.     Also states she still has swelling. Ankles and toes.       TSH   Date Value Ref Range Status   12/13/2019 7.53 (H) 0.40 - 4.00 mU/L Final   05/11/2019 7.01 (H) 0.40 - 4.00 mU/L Final   01/09/2019 1.88 0.40 - 4.00 mU/L Final   12/13/2017 5.06 (H) 0.40 - 4.00 mU/L Final   04/10/2015 3.33 0.40 - 4.00 mU/L Final     T4 Free   Date Value Ref Range Status   12/13/2019 1.39 0.76 - 1.46 ng/dL Final   05/11/2019 1.04 0.76 - 1.46 ng/dL Final   12/26/2017 1.09 0.76 - 1.46 ng/dL Final   12/21/2015 1.29 0.76 - 1.46 ng/dL Final   03/29/2015 0.99 0.76 - 1.46 ng/dL Final     Comment:     Effective 7/30/2014, the reference range for this assay has changed to reflect   new instrumentation/methodology.

## 2019-12-16 NOTE — TELEPHONE ENCOUNTER
Call to pt and advised. Agrees. Scheduled lab appt.     Cannot link the Vitamin D with any diagnosis. Not covered by her insurance.

## 2019-12-16 NOTE — TELEPHONE ENCOUNTER
Patient calling about her high Glucose and TSH. Patient also concerned about swelling of ankles and toes.  Please advise. Ok to call and  [phone]

## 2019-12-19 ENCOUNTER — TELEPHONE (OUTPATIENT)
Dept: INTERNAL MEDICINE | Facility: CLINIC | Age: 49
End: 2019-12-19

## 2019-12-19 DIAGNOSIS — R30.0 DYSURIA: ICD-10-CM

## 2019-12-19 DIAGNOSIS — R79.89 LOW SERUM CALCIUM: ICD-10-CM

## 2019-12-19 DIAGNOSIS — E55.9 VITAMIN D DEFICIENCY: ICD-10-CM

## 2019-12-19 DIAGNOSIS — R82.90 NONSPECIFIC FINDING ON EXAMINATION OF URINE: Primary | ICD-10-CM

## 2019-12-19 LAB
ALBUMIN UR-MCNC: NEGATIVE MG/DL
APPEARANCE UR: CLEAR
BACTERIA #/AREA URNS HPF: ABNORMAL /HPF
BILIRUB UR QL STRIP: NEGATIVE
CA-I SERPL ISE-MCNC: 4.5 MG/DL (ref 4.4–5.2)
COLOR UR AUTO: YELLOW
GLUCOSE UR STRIP-MCNC: NEGATIVE MG/DL
HGB UR QL STRIP: ABNORMAL
KETONES UR STRIP-MCNC: NEGATIVE MG/DL
LEUKOCYTE ESTERASE UR QL STRIP: ABNORMAL
NITRATE UR QL: NEGATIVE
NON-SQ EPI CELLS #/AREA URNS LPF: ABNORMAL /LPF
PH UR STRIP: 7 PH (ref 5–7)
PTH-INTACT SERPL-MCNC: 44 PG/ML (ref 18–80)
RBC #/AREA URNS AUTO: ABNORMAL /HPF
SOURCE: ABNORMAL
SP GR UR STRIP: 1.01 (ref 1–1.03)
UROBILINOGEN UR STRIP-ACNC: 2 EU/DL (ref 0.2–1)
WBC #/AREA URNS AUTO: ABNORMAL /HPF

## 2019-12-19 PROCEDURE — 83970 ASSAY OF PARATHORMONE: CPT | Performed by: INTERNAL MEDICINE

## 2019-12-19 PROCEDURE — 82306 VITAMIN D 25 HYDROXY: CPT | Performed by: INTERNAL MEDICINE

## 2019-12-19 PROCEDURE — 81001 URINALYSIS AUTO W/SCOPE: CPT | Performed by: INTERNAL MEDICINE

## 2019-12-19 PROCEDURE — 87086 URINE CULTURE/COLONY COUNT: CPT | Performed by: INTERNAL MEDICINE

## 2019-12-19 PROCEDURE — 36415 COLL VENOUS BLD VENIPUNCTURE: CPT | Performed by: INTERNAL MEDICINE

## 2019-12-19 PROCEDURE — 82330 ASSAY OF CALCIUM: CPT | Performed by: INTERNAL MEDICINE

## 2019-12-19 NOTE — TELEPHONE ENCOUNTER
Patient calls requesting lab results from this morning.  Patient advised lab work is still in process and that Dr. Lincoln is not in the clinic today.  Patient states she has terrible back pain and is requesting another provider notify her as soon as the lab work is returned, hopefully by the end of the day.

## 2019-12-20 DIAGNOSIS — F31.9 BIPOLAR I DISORDER (H): ICD-10-CM

## 2019-12-20 LAB
BACTERIA SPEC CULT: NORMAL
DEPRECATED CALCIDIOL+CALCIFEROL SERPL-MC: 43 UG/L (ref 20–75)
SPECIMEN SOURCE: NORMAL

## 2019-12-20 NOTE — TELEPHONE ENCOUNTER
Patient called again, let her know we would call her with results once reviewed by provider. No need to call back.

## 2019-12-20 NOTE — TELEPHONE ENCOUNTER
Patient calling to ask about lab results and what she should do from here. Please call her back to advise.

## 2019-12-24 RX ORDER — CYCLOBENZAPRINE HCL 10 MG
TABLET ORAL
Qty: 90 TABLET | Refills: 0 | OUTPATIENT
Start: 2019-12-24

## 2019-12-26 DIAGNOSIS — F31.9 BIPOLAR I DISORDER (H): ICD-10-CM

## 2019-12-30 DIAGNOSIS — E03.9 ACQUIRED HYPOTHYROIDISM: ICD-10-CM

## 2019-12-30 DIAGNOSIS — E78.5 HYPERLIPIDEMIA LDL GOAL <130: ICD-10-CM

## 2019-12-30 RX ORDER — MIRTAZAPINE 30 MG/1
TABLET, FILM COATED ORAL
Qty: 180 TABLET | Refills: 2 | OUTPATIENT
Start: 2019-12-30

## 2019-12-31 NOTE — TELEPHONE ENCOUNTER
"Requested Prescriptions   Pending Prescriptions Disp Refills     levothyroxine (SYNTHROID/LEVOTHROID) 50 MCG tablet [Pharmacy Med Name: LEVOTHYROXINE SODIUM 50 MCG Tablet] 90 tablet 0     Sig: TAKE 1 TABLET EVERY DAY  Last Written Prescription Date:  10/25/19  Last Fill Quantity: 90 tab,  # refills: 0   Last office visit: 12/13/2019 with prescribing provider:  [place]   Future Office Visit:   Next 5 appointments (look out 90 days)    Jan 13, 2020  9:40 AM CST  PHYSICAL with Liborio Lincoln MD  [facility] ([facility] [address]  [facility] [address]  [phone]             Thyroid Protocol Failed - 12/30/2019  7:52 PM        Failed - Normal TSH on file in past 12 months     Recent Labs   Lab Test 12/13/19  1004   TSH 7.53*              Passed - Patient is 12 years or older        Passed - Recent (12 mo) or future (30 days) visit within the authorizing provider's specialty     Patient has had an office visit with the authorizing provider or a provider within the authorizing providers department within the previous 12 mos or has a future within next 30 days. See \"Patient Info\" tab in inbasket, or \"Choose Columns\" in Meds & Orders section of the refill encounter.              Passed - Medication is active on med list        Passed - No active pregnancy on record     If patient is pregnant or has had a positive pregnancy test, please check TSH.          Passed - No positive pregnancy test in past 12 months     If patient is pregnant or has had a positive pregnancy test, please check TSH.          atorvastatin (LIPITOR) 10 MG tablet [Pharmacy Med Name: ATORVASTATIN CALCIUM 10 MG Tablet] 90 tablet 0     Sig: TAKE 1 TABLET EVERY DAY  Last Written Prescription Date:  10/25/19  Last Fill Quantity: 90 tab,  # refills: 0   Last office visit: 12/13/2019 with prescribing provider:  [place]   Future Office Visit:   Next 5 appointments (look out 90 days)    Jan 13, 2020  9:40 " "AM CST  PHYSICAL with Liborio Lincoln MD  [facility] [address]  [facility] [id number] [phone]             Statins Protocol Passed - 12/30/2019  7:52 PM        Passed - LDL on file in past 12 months     Recent Labs   Lab Test 07/01/19  0920   LDL <1             Passed - No abnormal creatine kinase in past 12 months     Recent Labs   Lab Test 05/13/19  0512                   Passed - Recent (12 mo) or future (30 days) visit within the authorizing provider's specialty     Patient has had an office visit with the authorizing provider or a provider within the authorizing providers department within the previous 12 mos or has a future within next 30 days. See \"Patient Info\" tab in inbasket, or \"Choose Columns\" in Meds & Orders section of the refill encounter.              Passed - Medication is active on med list        Passed - Patient is age 18 or older        Passed - No active pregnancy on record        Passed - No positive pregnancy test in past 12 months           "

## 2020-01-02 ENCOUNTER — ANCILLARY PROCEDURE (OUTPATIENT)
Dept: GENERAL RADIOLOGY | Facility: CLINIC | Age: 50
End: 2020-01-02
Attending: NURSE PRACTITIONER
Payer: COMMERCIAL

## 2020-01-02 ENCOUNTER — TELEPHONE (OUTPATIENT)
Dept: INTERNAL MEDICINE | Facility: CLINIC | Age: 50
End: 2020-01-02

## 2020-01-02 DIAGNOSIS — K59.00 CONSTIPATION, UNSPECIFIED CONSTIPATION TYPE: ICD-10-CM

## 2020-01-02 DIAGNOSIS — K59.00 CONSTIPATION, UNSPECIFIED CONSTIPATION TYPE: Primary | ICD-10-CM

## 2020-01-02 PROCEDURE — 74019 RADEX ABDOMEN 2 VIEWS: CPT

## 2020-01-02 RX ORDER — LEVOTHYROXINE SODIUM 50 UG/1
TABLET ORAL
Qty: 90 TABLET | Refills: 1 | Status: SHIPPED | OUTPATIENT
Start: 2020-01-02 | End: 2020-01-13

## 2020-01-02 RX ORDER — ATORVASTATIN CALCIUM 10 MG/1
TABLET, FILM COATED ORAL
Qty: 90 TABLET | Refills: 1 | Status: SHIPPED | OUTPATIENT
Start: 2020-01-02 | End: 2020-01-13

## 2020-01-02 NOTE — TELEPHONE ENCOUNTER
Reason for call:  Patient reporting a symptom    Symptom or request: stomach bloating    Duration (how long have symptoms been present): 3 days    Have you been treated for this before? No    Additional comments: patient reports stomach bloating which she states is painful to the touch. She has tried an enema, Milk of Magnesia, Miralax, and none have given her relief. Please call her back to advise.     Phone Number patient can be reached at:  Cell number on file:    Telephone Information:   Mobile [phone]       Best Time:  any    Can we leave a detailed message on this number:  YES    Call taken on 1/2/2020 at 8:25 AM by Elma Negro

## 2020-01-02 NOTE — TELEPHONE ENCOUNTER
Reviewed medical records.  I have not seen this patient however, please call her to come in for a abdominal x-ray today (order placed) to make sure no bowel obstruction. If symptoms worsen while waiting for results....where she has vomiting and severe belly pain, go to ER for further evaluation.  Limit food intake and increase fluids for now.

## 2020-01-02 NOTE — TELEPHONE ENCOUNTER
Call to pt and advised. She states she just had a large BM and feels better now. Advised order is in her chart if would like to schedule xray appt, she can. She agrees.

## 2020-01-02 NOTE — TELEPHONE ENCOUNTER
"Pt states she woke up on Monday with stomach bloating. Feels Nauseated, no vomiting.   Eating makes it worse.     Has been Constipated. Had Enema yesterday, only small hard amount of stool came out.     Pain level is \"bad\", in the whole abdomen area.     Her friend tried to find Mag Citrate at [employer] for her but couldn't find it.     Please advise if should go to ED or UC, or Increase Miralax or MOM?    Advised to only take in small amounts of fluids for now. She agrees.      "

## 2020-01-13 ENCOUNTER — OFFICE VISIT (OUTPATIENT)
Dept: INTERNAL MEDICINE | Facility: CLINIC | Age: 50
End: 2020-01-13
Payer: COMMERCIAL

## 2020-01-13 VITALS
TEMPERATURE: 98.4 F | HEART RATE: 88 BPM | DIASTOLIC BLOOD PRESSURE: 84 MMHG | RESPIRATION RATE: 20 BRPM | WEIGHT: 136.1 LBS | SYSTOLIC BLOOD PRESSURE: 124 MMHG | BODY MASS INDEX: 21.87 KG/M2 | HEIGHT: 66 IN | OXYGEN SATURATION: 98 %

## 2020-01-13 DIAGNOSIS — E03.9 ACQUIRED HYPOTHYROIDISM: ICD-10-CM

## 2020-01-13 DIAGNOSIS — I10 ESSENTIAL HYPERTENSION: ICD-10-CM

## 2020-01-13 DIAGNOSIS — R09.81 NASAL CONGESTION: ICD-10-CM

## 2020-01-13 DIAGNOSIS — F31.9 BIPOLAR I DISORDER (H): ICD-10-CM

## 2020-01-13 DIAGNOSIS — I10 BENIGN ESSENTIAL HYPERTENSION: ICD-10-CM

## 2020-01-13 DIAGNOSIS — Z00.00 ENCOUNTER FOR PREVENTATIVE ADULT HEALTH CARE EXAMINATION: Primary | ICD-10-CM

## 2020-01-13 DIAGNOSIS — R82.90 NONSPECIFIC FINDING ON EXAMINATION OF URINE: ICD-10-CM

## 2020-01-13 DIAGNOSIS — K21.9 GASTROESOPHAGEAL REFLUX DISEASE WITHOUT ESOPHAGITIS: ICD-10-CM

## 2020-01-13 DIAGNOSIS — E78.5 HYPERLIPIDEMIA LDL GOAL <130: ICD-10-CM

## 2020-01-13 LAB
ALBUMIN UR-MCNC: ABNORMAL MG/DL
APPEARANCE UR: CLEAR
BACTERIA #/AREA URNS HPF: ABNORMAL /HPF
BILIRUB UR QL STRIP: NEGATIVE
COLOR UR AUTO: YELLOW
ERYTHROCYTE [DISTWIDTH] IN BLOOD BY AUTOMATED COUNT: 16.2 % (ref 10–15)
GLUCOSE UR STRIP-MCNC: NEGATIVE MG/DL
HCT VFR BLD AUTO: 40.4 % (ref 35–47)
HGB BLD-MCNC: 13.2 G/DL (ref 11.7–15.7)
HGB UR QL STRIP: NEGATIVE
KETONES UR STRIP-MCNC: NEGATIVE MG/DL
LEUKOCYTE ESTERASE UR QL STRIP: ABNORMAL
MCH RBC QN AUTO: 34.3 PG (ref 26.5–33)
MCHC RBC AUTO-ENTMCNC: 32.7 G/DL (ref 31.5–36.5)
MCV RBC AUTO: 105 FL (ref 78–100)
NITRATE UR QL: POSITIVE
NON-SQ EPI CELLS #/AREA URNS LPF: ABNORMAL /LPF
PH UR STRIP: 7.5 PH (ref 5–7)
PLATELET # BLD AUTO: 201 10E9/L (ref 150–450)
RBC # BLD AUTO: 3.85 10E12/L (ref 3.8–5.2)
RBC #/AREA URNS AUTO: ABNORMAL /HPF
SOURCE: ABNORMAL
SP GR UR STRIP: 1.02 (ref 1–1.03)
UROBILINOGEN UR STRIP-ACNC: 1 EU/DL (ref 0.2–1)
WBC # BLD AUTO: 10.4 10E9/L (ref 4–11)
WBC #/AREA URNS AUTO: ABNORMAL /HPF

## 2020-01-13 PROCEDURE — 84443 ASSAY THYROID STIM HORMONE: CPT | Performed by: INTERNAL MEDICINE

## 2020-01-13 PROCEDURE — 80053 COMPREHEN METABOLIC PANEL: CPT | Performed by: INTERNAL MEDICINE

## 2020-01-13 PROCEDURE — 80061 LIPID PANEL: CPT | Performed by: INTERNAL MEDICINE

## 2020-01-13 PROCEDURE — 36415 COLL VENOUS BLD VENIPUNCTURE: CPT | Performed by: INTERNAL MEDICINE

## 2020-01-13 PROCEDURE — 84439 ASSAY OF FREE THYROXINE: CPT | Performed by: INTERNAL MEDICINE

## 2020-01-13 PROCEDURE — 99396 PREV VISIT EST AGE 40-64: CPT | Performed by: INTERNAL MEDICINE

## 2020-01-13 PROCEDURE — 87086 URINE CULTURE/COLONY COUNT: CPT | Performed by: INTERNAL MEDICINE

## 2020-01-13 PROCEDURE — 99214 OFFICE O/P EST MOD 30 MIN: CPT | Mod: 25 | Performed by: INTERNAL MEDICINE

## 2020-01-13 PROCEDURE — 81001 URINALYSIS AUTO W/SCOPE: CPT | Performed by: INTERNAL MEDICINE

## 2020-01-13 PROCEDURE — 85027 COMPLETE CBC AUTOMATED: CPT | Performed by: INTERNAL MEDICINE

## 2020-01-13 RX ORDER — LEVOTHYROXINE SODIUM 50 UG/1
TABLET ORAL
Qty: 90 TABLET | Refills: 3 | Status: SHIPPED | OUTPATIENT
Start: 2020-01-13 | End: 2020-12-08

## 2020-01-13 RX ORDER — FLUTICASONE PROPIONATE 50 MCG
SPRAY, SUSPENSION (ML) NASAL
Qty: 48 G | Refills: 1 | Status: SHIPPED | OUTPATIENT
Start: 2020-01-13 | End: 2020-07-20

## 2020-01-13 RX ORDER — CARVEDILOL 12.5 MG/1
12.5 TABLET ORAL 2 TIMES DAILY WITH MEALS
Qty: 180 TABLET | Refills: 3 | Status: SHIPPED | OUTPATIENT
Start: 2020-01-13 | End: 2020-01-13

## 2020-01-13 RX ORDER — CYCLOBENZAPRINE HCL 10 MG
10 TABLET ORAL AT BEDTIME
Qty: 90 TABLET | Refills: 0 | Status: SHIPPED | OUTPATIENT
Start: 2020-01-13 | End: 2020-03-30

## 2020-01-13 RX ORDER — ATORVASTATIN CALCIUM 10 MG/1
TABLET, FILM COATED ORAL
Qty: 90 TABLET | Refills: 3 | Status: SHIPPED | OUTPATIENT
Start: 2020-01-13 | End: 2020-12-08

## 2020-01-13 RX ORDER — AMLODIPINE BESYLATE 5 MG/1
5 TABLET ORAL DAILY
Qty: 90 TABLET | Refills: 3 | Status: SHIPPED | OUTPATIENT
Start: 2020-01-13 | End: 2021-06-01

## 2020-01-13 RX ORDER — ECHINACEA PURPUREA EXTRACT 125 MG
TABLET ORAL
Qty: 30 ML | Refills: 1 | Status: SHIPPED | OUTPATIENT
Start: 2020-01-13 | End: 2021-01-01

## 2020-01-13 RX ORDER — ATORVASTATIN CALCIUM 10 MG/1
TABLET, FILM COATED ORAL
Qty: 90 TABLET | Refills: 3 | Status: SHIPPED | OUTPATIENT
Start: 2020-01-13 | End: 2020-01-13

## 2020-01-13 RX ORDER — PANTOPRAZOLE SODIUM 40 MG/1
40 TABLET, DELAYED RELEASE ORAL
Qty: 90 TABLET | Refills: 3 | Status: ON HOLD | OUTPATIENT
Start: 2020-01-13 | End: 2020-11-29

## 2020-01-13 RX ORDER — LEVOTHYROXINE SODIUM 50 UG/1
TABLET ORAL
Qty: 90 TABLET | Refills: 3 | Status: SHIPPED | OUTPATIENT
Start: 2020-01-13 | End: 2020-01-13

## 2020-01-13 RX ORDER — CARVEDILOL 12.5 MG/1
12.5 TABLET ORAL 2 TIMES DAILY WITH MEALS
Qty: 180 TABLET | Refills: 3 | Status: SHIPPED | OUTPATIENT
Start: 2020-01-13 | End: 2020-12-04

## 2020-01-13 RX ORDER — FLUTICASONE PROPIONATE 50 MCG
1 SPRAY, SUSPENSION (ML) NASAL DAILY
Qty: 16 G | Refills: 1 | Status: SHIPPED | OUTPATIENT
Start: 2020-01-13 | End: 2020-01-13

## 2020-01-13 ASSESSMENT — ENCOUNTER SYMPTOMS
HEARTBURN: 1
DIZZINESS: 0
SORE THROAT: 1
NERVOUS/ANXIOUS: 1
FEVER: 0
CONSTIPATION: 0
WEAKNESS: 0
EYE PAIN: 0
HEMATOCHEZIA: 0
HEADACHES: 1
ABDOMINAL PAIN: 1
JOINT SWELLING: 1
COUGH: 0
HEMATURIA: 0
MYALGIAS: 1
ARTHRALGIAS: 1
SHORTNESS OF BREATH: 0
DYSURIA: 1
FREQUENCY: 1
DIARRHEA: 0

## 2020-01-13 ASSESSMENT — ACTIVITIES OF DAILY LIVING (ADL): CURRENT_FUNCTION: NO ASSISTANCE NEEDED

## 2020-01-13 ASSESSMENT — PATIENT HEALTH QUESTIONNAIRE - PHQ9
10. IF YOU CHECKED OFF ANY PROBLEMS, HOW DIFFICULT HAVE THESE PROBLEMS MADE IT FOR YOU TO DO YOUR WORK, TAKE CARE OF THINGS AT HOME, OR GET ALONG WITH OTHER PEOPLE: VERY DIFFICULT
SUM OF ALL RESPONSES TO PHQ QUESTIONS 1-9: 15
SUM OF ALL RESPONSES TO PHQ QUESTIONS 1-9: 15

## 2020-01-13 ASSESSMENT — MIFFLIN-ST. JEOR: SCORE: 1251.16

## 2020-01-13 NOTE — PROGRESS NOTES
SUBJECTIVE:   CC: Ana Laura Wharton is an 49 year old woman who presents for preventive health visit.     HPI  Ability to successfully perform activities of daily living: Yes, no assistance needed  Home safety:  none identified   Hearing impairment: No           PROBLEMS TO ADD ON...    Has h/o HTN. on medical treatment. BP has been controlled. No side effects from medications. No CP, HA, dizziness. good compliance with medications and low salt diet.  Has H/O hyperlipidemia. On medical treatment and diet. No side effects. No muscle weakness, myalgias or upset stomach.   Has history of hypothyroidism. On replacement treatment with Synthroid. No heat /cold intolerance, heart palpitations, weight loss/ gain ,  change in bowel habits.  Has h/o bipolar disorder. Follows with psychiatry. On medications.   Concern for cough, nasal secretions, yellow green. No SOB, no fever. No HA.     Today's PHQ-2 Score:   PHQ-2 ( 1999 Pfizer) 1/13/2020   Q1: Little interest or pleasure in doing things -   Q2: Feeling down, depressed or hopeless -   PHQ-2 Score -   Q1: Little interest or pleasure in doing things -   Q2: Feeling down, depressed or hopeless -   PHQ-2 Score Incomplete   Some encounter information is confidential and restricted. Go to Review Flowsheets activity to see all data.       Abuse: Current or Past(Physical, Sexual or Emotional)- No  Do you feel safe in your environment? Yes        Social History     Tobacco Use     Smoking status: Current Every Day Smoker     Packs/day: 0.50     Types: Cigarettes     Smokeless tobacco: Never Used   Substance Use Topics     Alcohol use: Yes     Alcohol/week: 2.0 standard drinks     Types: 2 Glasses of wine per week     Comment: 1 glass of wine per week         Alcohol Use 1/9/2019   Prescreen: >3 drinks/day or >7 drinks/week? No       Reviewed orders with patient.  Reviewed health maintenance and updated orders accordingly - Yes  Lab work is in process    Mammogram Screening: Patient  "over age 50, mutual decision to screen reflected in health maintenance.    Pertinent mammograms are reviewed under the imaging tab.  History of abnormal Pap smear: NO - age 30- 65 PAP every 3 years recommended  PAP / HPV Latest Ref Rng & Units 1/9/2019   PAP - ASC-H(A)   HPV 16 DNA NEG:Negative Negative   HPV 18 DNA NEG:Negative Negative   OTHER HR HPV NEG:Negative Positive(A)     Reviewed and updated as needed this visit by clinical staff  Tobacco  Allergies  Meds  Med Hx  Surg Hx  Fam Hx  Soc Hx        Reviewed and updated as needed this visit by Provider            Review of Systems  CONSTITUTIONAL: NEGATIVE for fever, chills, change in weight  INTEGUMENTARY/SKIN: NEGATIVE for worrisome rashes, moles or lesions  EYES: NEGATIVE for vision changes or irritation  ENT: NEGATIVE for ear, mouth and throat problems  RESP: NEGATIVE for significant cough or SOB  BREAST: NEGATIVE for masses, tenderness or discharge  CV: NEGATIVE for chest pain, palpitations or peripheral edema  GI: NEGATIVE for nausea, abdominal pain, heartburn, or change in bowel habits  : NEGATIVE for unusual urinary or vaginal symptoms. No vaginal bleeding.  MUSCULOSKELETAL: NEGATIVE for significant arthralgias or myalgia  NEURO: NEGATIVE for weakness, dizziness or paresthesias  PSYCHIATRIC: NEGATIVE for changes in mood or affect      OBJECTIVE:   /84 (BP Location: Left arm, Patient Position: Sitting, Cuff Size: Adult Regular)   Pulse 88   Temp 98.4  F (36.9  C) (Oral)   Resp 20   Ht 1.664 m (5' 5.5\")   Wt 61.7 kg (136 lb 1.6 oz)   SpO2 98%   BMI 22.30 kg/m    Physical Exam  GENERAL: healthy, alert and no distress  EYES: Eyes grossly normal to inspection, PERRL and conjunctivae and sclerae normal  HENT: ear canals and TM's normal, nose and mouth without ulcers or lesions  NECK: no adenopathy, no asymmetry, masses, or scars and thyroid normal to palpation  RESP: lungs clear to auscultation - no rales, rhonchi or wheezes  CV: regular " rate and rhythm, normal S1 S2, no S3 or S4, no murmur, click or rub, no peripheral edema and peripheral pulses strong  ABDOMEN: soft, nontender, no hepatosplenomegaly, no masses and bowel sounds normal  MS: no gross musculoskeletal defects noted, no edema  SKIN: no suspicious lesions or rashes  NEURO: Normal strength and tone, mentation intact and speech normal  PSYCH: mentation appears normal, affect normal/bright    Diagnostic Test Results:  Labs reviewed in Epic    ASSESSMENT/PLAN:       ICD-10-CM    1. Encounter for preventative adult health care examination Z00.00 CBC with platelets     Comprehensive metabolic panel     Lipid panel reflex to direct LDL Fasting     TSH with free T4 reflex     UA with Microscopic reflex to Culture   2. Essential hypertension I10 amLODIPine (NORVASC) 5 MG tablet     OFFICE/OUTPT VISIT,EST,LEVL III   3. Bipolar I disorder (H) F31.9 cyclobenzaprine (FLEXERIL) 10 MG tablet   4. Nasal congestion R09.81 fluticasone (FLONASE) 50 MCG/ACT nasal spray     sodium chloride (OCEAN) 0.65 % nasal spray     OFFICE/OUTPT VISIT,EST,LEVL III   5. Hyperlipidemia LDL goal <130 E78.5 atorvastatin (LIPITOR) 10 MG tablet     OFFICE/OUTPT VISIT,EST,LEVL III     DISCONTINUED: atorvastatin (LIPITOR) 10 MG tablet   6. Benign essential hypertension I10 carvedilol (COREG) 12.5 MG tablet     DISCONTINUED: carvedilol (COREG) 12.5 MG tablet   7. Acquired hypothyroidism E03.9 levothyroxine (SYNTHROID/LEVOTHROID) 50 MCG tablet     OFFICE/OUTPT VISIT,EST,LEVL III     DISCONTINUED: levothyroxine (SYNTHROID/LEVOTHROID) 50 MCG tablet   8. Gastroesophageal reflux disease without esophagitis K21.9 pantoprazole (PROTONIX) 40 MG EC tablet       Assess lab work   Continue treatment   Symptomatic treatment for cough, nasal congestion, no evidence of bacterial sinusitis   Follow up with psychiatry   Needs GYN follow up for history of abnormal PAP     COUNSELING:  Reviewed preventive health counseling, as reflected in patient  "instructions       Regular exercise       Healthy diet/nutrition       Vision screening    Estimated body mass index is 22.3 kg/m  as calculated from the following:    Height as of this encounter: 1.664 m (5' 5.5\").    Weight as of this encounter: 61.7 kg (136 lb 1.6 oz).         reports that she has been smoking cigarettes. She has been smoking about 0.50 packs per day. She has never used smokeless tobacco.  Tobacco Cessation Action Plan: Self help information given to patient    Counseling Resources:  ATP IV Guidelines  Pooled Cohorts Equation Calculator  Breast Cancer Risk Calculator  FRAX Risk Assessment  ICSI Preventive Guidelines  Dietary Guidelines for Americans, 2010  [employer] MyPlate  ASA Prophylaxis  Lung CA Screening    Lib[facility] MD  [facility]  Answers for HPI/ROS submitted by the patient on 1/13/2020   Annual Exam:  If you checked off any problems, how difficult have these problems made it for you to do your work, take care of things at home, or get along with other people?: Very difficult  PHQ9 TOTAL SCORE: 15    "

## 2020-01-14 LAB
ALBUMIN SERPL-MCNC: 3.3 G/DL (ref 3.4–5)
ALP SERPL-CCNC: 204 U/L (ref 40–150)
ALT SERPL W P-5'-P-CCNC: 16 U/L (ref 0–50)
ANION GAP SERPL CALCULATED.3IONS-SCNC: 8 MMOL/L (ref 3–14)
AST SERPL W P-5'-P-CCNC: 83 U/L (ref 0–45)
BACTERIA SPEC CULT: NORMAL
BILIRUB SERPL-MCNC: 0.8 MG/DL (ref 0.2–1.3)
BUN SERPL-MCNC: 8 MG/DL (ref 7–30)
CALCIUM SERPL-MCNC: 8.2 MG/DL (ref 8.5–10.1)
CHLORIDE SERPL-SCNC: 105 MMOL/L (ref 94–109)
CHOLEST SERPL-MCNC: 119 MG/DL
CO2 SERPL-SCNC: 24 MMOL/L (ref 20–32)
CREAT SERPL-MCNC: 0.65 MG/DL (ref 0.52–1.04)
GFR SERPL CREATININE-BSD FRML MDRD: >90 ML/MIN/{1.73_M2}
GLUCOSE SERPL-MCNC: 105 MG/DL (ref 70–99)
HDLC SERPL-MCNC: 46 MG/DL
LDLC SERPL CALC-MCNC: 55 MG/DL
NONHDLC SERPL-MCNC: 73 MG/DL
POTASSIUM SERPL-SCNC: 3.8 MMOL/L (ref 3.4–5.3)
PROT SERPL-MCNC: 8.3 G/DL (ref 6.8–8.8)
SODIUM SERPL-SCNC: 137 MMOL/L (ref 133–144)
SPECIMEN SOURCE: NORMAL
T4 FREE SERPL-MCNC: 1.19 NG/DL (ref 0.76–1.46)
TRIGL SERPL-MCNC: 92 MG/DL
TSH SERPL DL<=0.005 MIU/L-ACNC: 7.08 MU/L (ref 0.4–4)

## 2020-01-14 ASSESSMENT — PATIENT HEALTH QUESTIONNAIRE - PHQ9: SUM OF ALL RESPONSES TO PHQ QUESTIONS 1-9: 15

## 2020-01-20 ENCOUNTER — TELEPHONE (OUTPATIENT)
Dept: INTERNAL MEDICINE | Facility: CLINIC | Age: 50
End: 2020-01-20

## 2020-01-20 DIAGNOSIS — N39.0 RECURRENT UTI: Primary | ICD-10-CM

## 2020-01-20 RX ORDER — NITROFURANTOIN 25; 75 MG/1; MG/1
100 CAPSULE ORAL 2 TIMES DAILY
Qty: 10 CAPSULE | Refills: 0 | Status: SHIPPED | OUTPATIENT
Start: 2020-01-20 | End: 2020-02-11

## 2020-01-20 NOTE — TELEPHONE ENCOUNTER
Called patient and advised her of provider's comments below. Patient verbalized understanding.    Patient reports frequency and urgency. Patient denies burning with urination, abdominal pain, fever, or vomiting.     Patient states she understands if primary care provider does not want to treat with antibiotics. Please advise.

## 2020-01-20 NOTE — TELEPHONE ENCOUNTER
Patient would like urine results from 1/13/2020. Patient said her night was horrible. Does she need to be on medication? Ok to call and lm [phone]

## 2020-01-23 ENCOUNTER — TELEPHONE (OUTPATIENT)
Dept: PSYCHIATRY | Facility: CLINIC | Age: 50
End: 2020-01-23

## 2020-01-23 DIAGNOSIS — F41.9 ANXIETY DISORDER: ICD-10-CM

## 2020-01-23 DIAGNOSIS — F31.9 BIPOLAR I DISORDER (H): Primary | ICD-10-CM

## 2020-01-23 RX ORDER — OLANZAPINE 5 MG/1
TABLET ORAL
Qty: 60 TABLET | Refills: 0 | Status: SHIPPED | OUTPATIENT
Start: 2020-01-23 | End: 2020-02-03

## 2020-01-23 NOTE — TELEPHONE ENCOUNTER
[facility] Call Center    Phone Message    May a detailed message be left on voicemail: yes    Reason for Call: Other: Pt stated she is having a manic epsiode and was instructed to call the clinic as soon as possible.      Action Taken: Other: [facility]

## 2020-01-23 NOTE — TELEPHONE ENCOUNTER
"Writer called pt to gather more information. She informed this writer that she's manic. The pt said she's been \"spending loads of cash,\" not sleeping, hallucinating, drinking hard alcohol, and engaging in risky/impulsive behaviors.    The pt has not slept for the last two days. She's taking her medications as prescribed and has not missed any doses. She denies SI/SIB or any safety concerns, but is understandably worried that her symptoms will turn into a full blown manic episode. These are similar symptoms she's experienced in the past when manic.     She's experiencing AHs of music playing. She denies hearing voices and command hallucinations or VHs. She denies an increase in energy and said, \"I just feel lost. I realized I was looking at a blank TV for hours yesterday.\"    Pt has been experiencing tremors/shaking for the last few days that are uncontrollable. She's also experiencing stuttering or what she calls, \"tics.\" While on the phone with this writer, pt was struggling to speak. She denies any alcohol use this morning.     Pt confirmed that she can be safe. She has been visiting with friends every day this week and said they are helping her manage her medications and are also aware of her current mental state. Still she's concerned her symptoms may worsen.     Writer agreed to relay this information to the covering provider for recommendations. Will also add Dr. Rodriguez to this message for when he returns.   "

## 2020-01-23 NOTE — TELEPHONE ENCOUNTER
Nicole Lazaro MD Labossiere, Laura, RN   Caller: Unspecified (Today,  8:21 AM)             I would suggest that she increase her olanzepine dose from 10 mg to 15 mg (preferably to take at hs and may have an additional 5 mg po qday prn.   Does this sound ok?   SS        Called pt and relayed the above recommendations. She voiced understanding and would prefer a new prescription for 5 mg tabs, as the 10 mg tabs are quite small and will be difficult to cut.     Discussed pharmacy options, as pt will have to pay out of pocket for the prescription. [facility] is the cheapest, but patient would prefer it's sent to [facility] first and she will call them to confirm the price. If needed, she will c/b and writer can have it transferred to another pharmacy that will charge less.

## 2020-02-03 ENCOUNTER — TELEPHONE (OUTPATIENT)
Dept: PSYCHIATRY | Facility: CLINIC | Age: 50
End: 2020-02-03

## 2020-02-03 NOTE — TELEPHONE ENCOUNTER
[facility] Call Center    Phone Message    May a detailed message be left on voicemail: yes    Reason for Call: Other: Pt wanted to discuss prior auth needed for the Olanzapine . [facility] needs prior auth for Olanzapine. The nurse can call [facility] and request 90 day supply with 5mg tablets. She normally gets the 3 month supply but this time she only got the 1 month supply.     Action Taken: Other: [facility]

## 2020-02-03 NOTE — TELEPHONE ENCOUNTER
Prior Authorization Retail Medication Request    Medication/Dose: OLANZapine (ZYPREXA) 5 MG tablet  ICD code (if different than what is on RX):    Bipolar I disorder (H) [F31.9]       Anxiety disorder [F41.9]       Previously Tried and Failed:  Haloperidol lactate injection, lamotrigine, lithium, Latuda, quetiapine and ziprasidone   Rationale:  Pt has a diagnosis of Bipolar 1 Disorder. She has tried numerous antipsychotics during manic episodes, but Zyprexa has always been the most effective. She was previously on 10 mg at bedtime, but began to experience a manic episode that consisted of little to no sleep, increased energy, risky/impulsive behaviors, irritability, and mood swings. The olanzapine 5 mg nightly, plus once daily PRN was started. Pt noticed immediate improvements in sleep and overall symptoms. Discontinuing this medication now would lead to a return in symptoms and decompensation.     Insurance Name: [employer]  Insurance ID:  [id number]       Pharmacy Information (if different than what is on RX)  Name:    Phone:

## 2020-02-04 ENCOUNTER — TELEPHONE (OUTPATIENT)
Dept: INTERNAL MEDICINE | Facility: CLINIC | Age: 50
End: 2020-02-04

## 2020-02-04 NOTE — TELEPHONE ENCOUNTER
Patient is calling to get advice because she stubbed her baby toe pretty bad yesterday. She said she has a boot that she wore around December when she had a foot fracture and she is wondering if she should just wear that for awhile.

## 2020-02-04 NOTE — TELEPHONE ENCOUNTER
Pt states she ran into dining table. Bled for 20 minutes. Black and blue. Little toe on right foot. Swollen, just the toe.     Care advise given to use ice, tylenol/ibuprofen for pain. Tape/bandaid to other toe, if possible, to keep it immobile.   Schedule appt if sx worsen or not improving. She agrees.

## 2020-02-05 NOTE — TELEPHONE ENCOUNTER
Per Pharmacy, PA is needed for quantity limit. Insurance limits 1 per day.    PA Initiation    Medication: OLANZapine (ZYPREXA) 5 MG tablet  Insurance Company: [employer] - Phone [phone] Fax [phone]  Pharmacy Filling the Rx: [facility] - [facility] [address] [place] [address]  Filling Pharmacy Phone: [phone]  Filling Pharmacy Fax:    Start Date: 2/5/2020

## 2020-02-05 NOTE — TELEPHONE ENCOUNTER
Patient calls again regarding toe pain.  Patient states the boot does not seem to be helping her and wonders if she needs to see PCP or ortho.  Patient asked what they would usually do.  Advised in some situations they would suggest steven taping rather than a boot, but we can ask PCP.  Patient asks about splinting with a piece of wood.  Please address.

## 2020-02-06 NOTE — TELEPHONE ENCOUNTER
Prior Authorization Approval    Authorization Effective Date: 2/6/2020  Authorization Expiration Date: 12/31/2020  Medication: OLANZapine (ZYPREXA) 5 MG tablet-APPROVED  Approved Dose/Quantity:  Reference #:     Insurance Company: [employer] - Phone [phone] Fax [phone]  Expected CoPay:       CoPay Card Available:      Foundation Assistance Needed:    Which Pharmacy is filling the prescription (Not needed for infusion/clinic administered): [facility] - [address]  Pharmacy Notified: Yes-pharmacy will mail out to patient  Patient Notified: No

## 2020-02-07 ENCOUNTER — TELEPHONE (OUTPATIENT)
Dept: INTERNAL MEDICINE | Facility: CLINIC | Age: 50
End: 2020-02-07

## 2020-02-07 NOTE — TELEPHONE ENCOUNTER
Patient is calling about her toe injury. She says she can hardly walk and cant get a ride for her appointment today. She is wondering what she can do at home.

## 2020-02-07 NOTE — TELEPHONE ENCOUNTER
Pt cannot get a ride today. She is going to try to take a picture of her foot and put it through [employer].

## 2020-02-11 ENCOUNTER — OFFICE VISIT (OUTPATIENT)
Dept: INTERNAL MEDICINE | Facility: CLINIC | Age: 50
End: 2020-02-11
Payer: COMMERCIAL

## 2020-02-11 VITALS
HEIGHT: 66 IN | TEMPERATURE: 98.1 F | OXYGEN SATURATION: 97 % | HEART RATE: 95 BPM | BODY MASS INDEX: 21.63 KG/M2 | SYSTOLIC BLOOD PRESSURE: 102 MMHG | WEIGHT: 134.6 LBS | DIASTOLIC BLOOD PRESSURE: 58 MMHG | RESPIRATION RATE: 11 BRPM

## 2020-02-11 DIAGNOSIS — F31.4 BIPOLAR DISORDER, CURRENT EPISODE DEPRESSED, SEVERE, WITHOUT PSYCHOTIC FEATURES (H): ICD-10-CM

## 2020-02-11 DIAGNOSIS — S99.921A INJURY OF TOE ON RIGHT FOOT, INITIAL ENCOUNTER: Primary | ICD-10-CM

## 2020-02-11 PROCEDURE — 99213 OFFICE O/P EST LOW 20 MIN: CPT | Performed by: INTERNAL MEDICINE

## 2020-02-11 ASSESSMENT — MIFFLIN-ST. JEOR: SCORE: 1244.35

## 2020-02-11 NOTE — PROGRESS NOTES
Subjective     Ana Laura Wharton is a 49 year old female who presents to clinic today for the following health issues:    HPI     Presents with pain in the right 5th toe for 5 days, after hitting it on the table leg at home. Has had significant swelling, bruising, pain. Used Tylenol , ice , gradually improved. Swelling is subsided. Able to ambulate.   PROBLEMS TO ADD ON...  Has h/o depression. On medical treatment, controlled, no side effects. No depressive symptoms or suicidal ideation.      Patient Active Problem List   Diagnosis     Depressed bipolar I disorder in full remission (H)     Anxiety disorder     Suicide ideation     Bipolar disorder current episode depressed (H)     Overdose     Depression     Mood disorder (H)     Suicidal ideations     Bipolar I disorder (H)     Acute renal failure (H)     Alternating exotropia     Anxiety state     Other bipolar disorders     Personality disorder (H)     Tear film insufficiency     Dysfunctions associated with sleep stages or arousal from sleep     Abnormal finding of blood chemistry     Esophageal reflux     Other specified hypothyroidism     Impulse control disorder     Acidosis     Low back pain     Myopia     Opioid dependence (H)     Orofacial dyskinesia     Intractable migraine     Pure hypercholesterolemia     Poisoning by 4-aminophenol derivatives, undetermined intent     Essential hypertension     Left knee pain     Aftercare following surgery of the musculoskeletal system     UTI (urinary tract infection)     Acute cystitis     Clostridium difficile colitis     Drug overdose     Calcium channel blocker overdose     Intentional acetaminophen overdose (H)     Alcoholic intoxication with complication (H)     Hypotension due to medication     Acute renal failure, unspecified acute renal failure type (H)     Paroxysmal atrial fibrillation (H)     Acute respiratory failure with hypoxia (H)     Acute pulmonary edema (H)     Spontaneous pneumothorax     PRES  (posterior reversible encephalopathy syndrome)     Pancreatitis     Atypical squamous cells cannot exclude high grade squamous intraepithelial lesion on cytologic smear of cervix (ASC-H)     Past Surgical History:   Procedure Laterality Date     ARTHROSCOPY KNEE      L knee     CERVIX SURGERY      for pre-cancerous changes     left knee surgery       ORTHOPEDIC SURGERY      L shoulder     THORACIC SURGERY      for pneumothorax, pleurodesis and lobe resection       Social History     Tobacco Use     Smoking status: Current Every Day Smoker     Packs/day: 0.50     Types: Cigarettes     Smokeless tobacco: Never Used   Substance Use Topics     Alcohol use: Yes     Alcohol/week: 2.0 standard drinks     Types: 2 Glasses of wine per week     Comment: 1 glass of wine per week     Family History   Problem Relation Age of Onset     Depression Mother      Lipids Father         hyperlipidemia     Macular Degeneration Father      No Known Problems Brother      No Known Problems Son          Current Outpatient Medications   Medication Sig Dispense Refill     albuterol (PROAIR HFA/PROVENTIL HFA/VENTOLIN HFA) 108 (90 Base) MCG/ACT inhaler Inhale 2 puffs into the lungs every 6 hours as needed for shortness of breath / dyspnea or wheezing 1 Inhaler 0     amLODIPine (NORVASC) 5 MG tablet Take 1 tablet (5 mg) by mouth daily 90 tablet 3     atorvastatin (LIPITOR) 10 MG tablet TAKE 1 TABLET EVERY DAY 90 tablet 3     carvedilol (COREG) 12.5 MG tablet Take 1 tablet (12.5 mg) by mouth 2 times daily (with meals) 180 tablet 3     citalopram (CELEXA) 20 MG tablet Take 1 tablet (20 mg) by mouth daily 90 tablet 1     clonazePAM (KLONOPIN) 1 MG tablet TAKE 1 TABLET TWICE DAILY AND 2 TABLETS AT BEDTIME 120 tablet 1     cyclobenzaprine (FLEXERIL) 10 MG tablet Take 1 tablet (10 mg) by mouth At Bedtime 90 tablet 0     eletriptan (RELPAX) 40 MG tablet Take 40 mg by mouth at onset of headache for migraine       FIBER PO Take 2 tablets by mouth daily        fluticasone (FLONASE) 50 MCG/ACT nasal spray SHAKE LIQUID AND USE 1 SPRAY IN EACH NOSTRIL DAILY 48 g 1     folic acid (FOLVITE) 400 MCG tablet Take 400 mcg by mouth daily       hydrOXYzine (VISTARIL) 50 MG capsule Take 1 cap BID and 1 cap PRN for anxiety 270 capsule 0     ibuprofen (ADVIL/MOTRIN) 400 MG tablet Take 1 tablet (400 mg) by mouth 3 times daily 30 tablet 1     Levonorgestrel, [id number], (NORPLANT SYSTEM IL)        levothyroxine (SYNTHROID/LEVOTHROID) 50 MCG tablet TAKE 1 TABLET EVERY DAY 90 tablet 3     mirtazapine (REMERON) 30 MG tablet Take 2 tablets (60 mg) by mouth At Bedtime 180 tablet 2     Multiple Vitamins-Minerals (WOMENS MULTI VITAMIN & MINERAL PO) Take 1 tablet by mouth daily       OLANZapine (ZYPREXA) 10 MG tablet Take 1 tablet (10 mg) by mouth At Bedtime 90 tablet 1     OLANZapine (ZYPREXA) 5 MG tablet Take 1 tab (5 mg) with 10 mg tablet at bedtime. May take 5 mg daily PRN for anxiety. 180 tablet 0     ondansetron (ZOFRAN) 4 MG tablet Take 1 tablet (4 mg) by mouth every 8 hours as needed for nausea 30 tablet 0     pantoprazole (PROTONIX) 40 MG EC tablet Take 1 tablet (40 mg) by mouth every morning (before breakfast) 90 tablet 3     senna (SENOKOT) 8.6 MG tablet Take 1 tablet by mouth 2 times daily as needed for constipation 60 tablet 3     sodium chloride (OCEAN) 0.65 % nasal spray Use in both nostrils 3 times a day. 30 mL 1       Reviewed and updated as needed this visit by Provider         Review of Systems   ROS COMP: Constitutional, HEENT, cardiovascular, pulmonary, gi and gu systems are negative, except as otherwise noted.      Objective    There were no vitals taken for this visit.  There is no height or weight on file to calculate BMI.  Physical Exam   GENERAL: healthy, alert and no distress  MS: no gross musculoskeletal defects noted, no edema  Palpatory tenderness right 5th toe and dorsum of the foot, mild bruise and swelling, normal ROM   SKIN: no suspicious lesions or  ava    Diagnostic Test Results:  Labs reviewed in Epic        Assessment & Plan   Problem List Items Addressed This Visit     Bipolar disorder current episode depressed (H)      Other Visit Diagnoses     Injury of toe on right foot, initial encounter    -  Primary         Improved toe injury, no clinical evidence of fracture  Continue PRN Tylenol, ice, ACE wrap       See Patient Instructions  Return in about 6 months (around 8/11/2020) for Routine Visit.    Liborio Lincoln MD  [facility]

## 2020-02-19 ENCOUNTER — OFFICE VISIT (OUTPATIENT)
Dept: OBGYN | Facility: CLINIC | Age: 50
End: 2020-02-19
Payer: COMMERCIAL

## 2020-02-19 VITALS — WEIGHT: 135.4 LBS | DIASTOLIC BLOOD PRESSURE: 80 MMHG | SYSTOLIC BLOOD PRESSURE: 118 MMHG | BODY MASS INDEX: 22.19 KG/M2

## 2020-02-19 DIAGNOSIS — R87.611 ATYPICAL SQUAMOUS CELLS CANNOT EXCLUDE HIGH GRADE SQUAMOUS INTRAEPITHELIAL LESION ON CYTOLOGIC SMEAR OF CERVIX (ASC-H): Primary | ICD-10-CM

## 2020-02-19 DIAGNOSIS — Z12.4 ENCOUNTER FOR SCREENING FOR MALIGNANT NEOPLASM OF CERVIX: ICD-10-CM

## 2020-02-19 PROCEDURE — 99213 OFFICE O/P EST LOW 20 MIN: CPT | Performed by: OBSTETRICS & GYNECOLOGY

## 2020-02-19 PROCEDURE — G0145 SCR C/V CYTO,THINLAYER,RESCR: HCPCS | Performed by: OBSTETRICS & GYNECOLOGY

## 2020-02-19 PROCEDURE — G0476 HPV COMBO ASSAY CA SCREEN: HCPCS | Performed by: OBSTETRICS & GYNECOLOGY

## 2020-02-19 PROCEDURE — 87624 HPV HI-RISK TYP POOLED RSLT: CPT | Performed by: OBSTETRICS & GYNECOLOGY

## 2020-02-19 NOTE — PROGRESS NOTES
"Chief Complaint   Patient presents with     Repeat Pap Smear       Subjective:  48 yo here for follow up Pap.    Pap 1/9/19  ASC-H with +HPV other  Colpo and ECC 1/23/19 with MICHELLE 1       Health Maintenance   Topic Date Due     MIGRAINE ACTION PLAN  1970     HPV TEST  01/23/2020     PAP  01/23/2020     PHQ-9  04/13/2020     ZOSTER IMMUNIZATION (1 of 2) 10/14/2020     MEDICARE ANNUAL WELLNESS VISIT  01/13/2021     ADVANCE CARE PLANNING  10/21/2024     LIPID  01/13/2025     DTAP/TDAP/TD IMMUNIZATION (4 - Td) 07/22/2027     HIV SCREENING  Completed     INFLUENZA VACCINE  Completed     PNEUMOCOCCAL IMMUNIZATION 19-64 MEDIUM RISK  Completed     IPV IMMUNIZATION  Aged Out     MENINGITIS IMMUNIZATION  Aged Out       Allergies   Allergen Reactions     Amoxicillin-Pot Clavulanate      PN: LW Reaction: Itching, Pruritis     Azithromycin      Other reaction(s): Dermatitis     Cephalexin      PN: LW Reaction: Itching, Pruritis     Compazine [Prochlorperazine] Other (See Comments)     dystonia     Metoclopramide Other (See Comments)     \"I feel like I am crawling out of my skin\"     Minocycline Nausea and Vomiting     PN: DIARRHEA, VOMITING, AND STOMACH CRAMPS     Penicillins Itching     Reglan [Metoclopramide Hcl] Other (See Comments)     Sensation of \"crawling out of skin\"     Restoril [Temazepam]      Thorazine [Chlorpromazine] Other (See Comments)     dystonia     Abilify [Aripiprazole] Rash     Lamotrigine Rash     Severe drug rash - contraindication to receiving again. Skin peeling.      Sulfa Drugs Rash       Objective:  Vitals: /80   Wt 61.4 kg (135 lb 6.4 oz)   BMI 22.19 kg/m    BMI= Body mass index is 22.19 kg/m .    EGBUS normal  Vagina neg  Cervix nullip:  No lesions.  Pap obtained    Assessment/Plan:  1. Atypical squamous cells cannot exclude high grade squamous intraepithelial lesion on cytologic smear of cervix (ASC-H)    - Pap imaged thin layer screen with HPV - recommended age 30 - 65 years (select " HPV order below)  - HPV High Risk Types DNA Cervical    2. Encounter for screening for malignant neoplasm of cervix     - Pap imaged thin layer screen with HPV - recommended age 30 - 65 years (select HPV order below)        Kevin Mcwilliams MD

## 2020-02-21 LAB
COPATH REPORT: NORMAL
PAP: NORMAL

## 2020-02-24 ENCOUNTER — TELEPHONE (OUTPATIENT)
Dept: INTERNAL MEDICINE | Facility: CLINIC | Age: 50
End: 2020-02-24

## 2020-02-24 DIAGNOSIS — S99.929A TOE INJURY: Primary | ICD-10-CM

## 2020-02-24 DIAGNOSIS — S99.921A TOE INJURY, RIGHT, INITIAL ENCOUNTER: ICD-10-CM

## 2020-02-24 NOTE — TELEPHONE ENCOUNTER
Patient is calling because she is still having a lot of foot and toe pain and she is thinking she would like a referral to the orthopedic. She uses [facility].

## 2020-02-25 ENCOUNTER — TRANSFERRED RECORDS (OUTPATIENT)
Dept: HEALTH INFORMATION MANAGEMENT | Facility: CLINIC | Age: 50
End: 2020-02-25

## 2020-03-03 ENCOUNTER — OFFICE VISIT (OUTPATIENT)
Dept: OBGYN | Facility: CLINIC | Age: 50
End: 2020-03-03
Payer: COMMERCIAL

## 2020-03-03 VITALS — SYSTOLIC BLOOD PRESSURE: 124 MMHG | DIASTOLIC BLOOD PRESSURE: 76 MMHG | BODY MASS INDEX: 22.5 KG/M2 | WEIGHT: 137.3 LBS

## 2020-03-03 DIAGNOSIS — R30.0 DYSURIA: ICD-10-CM

## 2020-03-03 DIAGNOSIS — R35.0 URINARY FREQUENCY: ICD-10-CM

## 2020-03-03 DIAGNOSIS — B97.7 HUMAN PAPILLOMA VIRUS (HPV) INFECTION: Primary | ICD-10-CM

## 2020-03-03 PROCEDURE — 87086 URINE CULTURE/COLONY COUNT: CPT | Performed by: OBSTETRICS & GYNECOLOGY

## 2020-03-03 PROCEDURE — 88305 TISSUE EXAM BY PATHOLOGIST: CPT | Performed by: OBSTETRICS & GYNECOLOGY

## 2020-03-03 PROCEDURE — 88305 TISSUE EXAM BY PATHOLOGIST: CPT | Mod: 26 | Performed by: OBSTETRICS & GYNECOLOGY

## 2020-03-03 PROCEDURE — 57456 ENDOCERV CURETTAGE W/SCOPE: CPT | Performed by: OBSTETRICS & GYNECOLOGY

## 2020-03-03 RX ORDER — NITROFURANTOIN 25; 75 MG/1; MG/1
100 CAPSULE ORAL 2 TIMES DAILY
Qty: 6 CAPSULE | Refills: 0 | Status: SHIPPED | OUTPATIENT
Start: 2020-03-03 | End: 2020-04-10

## 2020-03-03 NOTE — PROGRESS NOTES
49 year old  presents for colposcopy.      Indication for procedure:HPV (Human Papillomavirus) positive. Recent Pap was WNL     There are many types of HPV, but we test Pap samples for the high-risk types. HPV can be the cause of an abnormal Pap smear result.  The high-risk types of HPV can also be related to the potential development of cervical cancer if not monitored and/or treated appropriately  Prior history of cervical dysplasia: Yes Cervix biopsy and ECC 1/2019 showed MICHELLE 1        Prior Colposcopy history: Yes as above  Prior LEEP:No  No LMP recorded. Patient has had an implant.    Tobacco: Yes current smoker  Gardasil vaccination status:No    She denies the possibility of pregnancy : Nexplanon in place    Discussed nature of HPV related infection, natural history and association with cervical dysplasia.  Procedure for colposcopy and biopsy was explained to the patient and consent obtained.  All the patient's questions were answered.    PROCEDURE:  COLPOSCOPY  After a procedural timeout was taken, she was positioned in dorsal lithotomy and a speculum was inserted to allow visualization of the cervix. A 5% acetic acid solution was applied to the ectocervix with large swabs. Lugols solution was also applied.  Colposcopic examination was then undertaken of the cervix, distal vaginal canal and vaginal fornices.    FINDINGS:Physical Exam  Genitourinary:              Procedures        Plan: Specimens labelled and sent to pathology., Will base further treatment on pathology findings., post biopsy instructions given to patient and will call to discuss Pathology results.      Celso Mcwilliams MD

## 2020-03-03 NOTE — NURSING NOTE
"Chief Complaint   Patient presents with     Colposcopy     initial /76   Wt 62.3 kg (137 lb 4.8 oz)   BMI 22.50 kg/m   Estimated body mass index is 22.5 kg/m  as calculated from the following:    Height as of 2/11/20: 1.664 m (5' 5.5\").    Weight as of this encounter: 62.3 kg (137 lb 4.8 oz).  BP completed using cuff size regular.  Shantelle Luke CMA    "

## 2020-03-04 LAB
BACTERIA SPEC CULT: NORMAL
SPECIMEN SOURCE: NORMAL

## 2020-03-05 LAB — COPATH REPORT: NORMAL

## 2020-03-07 ENCOUNTER — NURSE TRIAGE (OUTPATIENT)
Dept: NURSING | Facility: CLINIC | Age: 50
End: 2020-03-07

## 2020-03-07 NOTE — TELEPHONE ENCOUNTER
"I was seen in clinic on March 3rd and started on antibiotic for a UTI and it's not working\".   Caller just finished up 3 day course of Macrobid and she is reporting very painful urination.    She is insistent that she had a UTI as she always gets them.    Paged on call provider for [facility] to speak to patient at [phone].  Dr. Grewal in on call, page sent @ 9:20 am via smart web.    Dr. Grewal called me at [facility] as she could not reach the patient at the above number.  Verified that it is correct call back number.    Dr. Grewal advised that the UC is negative caller does not have a UTI.  If she is having pain, she should be advised to be seen today in UC.  Attempted to reach patient at 9:30 and 9:38 am and no answer.      If patient calls back, advise that her urine culture is negative for UTI, needs to be seen in UC today for further evaluation.     Reason for Disposition    [1] SEVERE pain (e.g., excruciating) AND [2] no improvement 2 hours after pain medications    Additional Information    Negative: Shock suspected (e.g., cold/pale/clammy skin, too weak to stand, low BP, rapid pulse)    Negative: Sounds like a life-threatening emergency to the triager    Negative: Urinary tract infection suspected, but not taking antibiotics    Negative: [1] Unable to urinate (or only a few drops) > 4 hours AND     [2] bladder feels very full (e.g., palpable bladder or strong urge to urinate)    Negative: Passing pure blood or large blood clots (i.e., size > a dime)  (Exceptions: flecks, small strands, or pinkish-red color)    Negative: Patient sounds very sick or weak to the triager    Protocols used: URINARY TRACT INFECTION ON ANTIBIOTIC FOLLOW-UP CALL - TASHA-MICHAEL Pineda RN  [facility]      "

## 2020-03-08 ENCOUNTER — NURSE TRIAGE (OUTPATIENT)
Dept: NURSING | Facility: CLINIC | Age: 50
End: 2020-03-08

## 2020-03-08 NOTE — TELEPHONE ENCOUNTER
Pt calling back, see triage encounter from yesterday.  Writer gave patient the information from Dr. Grewal and advised that pt seek care in UC today for worsening UTI symptoms.      She verbalized understanding and had no further questions.     Negin Yu RN/MARIMAR    Reason for Disposition    Caller requesting lab results    Additional Information    Negative: Lab calling with strep throat test results and triager can call in prescription    Negative: Lab calling with urinalysis test results and triager can call in prescription    Negative: Medication questions    Negative: ED call to PCP    Negative: Physician call to PCP    Negative: Call about patient who is currently hospitalized    Negative: Lab or radiology calling with CRITICAL test results    Negative: [1] Prescription not at pharmacy AND [2] was prescribed today by PCP    Negative: [1] Follow-up call from patient regarding patient's clinical status AND [2] information urgent    Negative: [1] Caller requests to speak ONLY to PCP AND [2] URGENT question    Negative: [1] Caller requests to speak to PCP now AND [2] won't tell us reason for call  (Exception: if 10 pm to 6 am, caller must first discuss reason for the call)    Negative: Notification of hospital admission    Negative: Notification of death    Protocols used: PCP CALL - NO TRIAGE-A-

## 2020-03-13 ENCOUNTER — TELEPHONE (OUTPATIENT)
Dept: INTERNAL MEDICINE | Facility: CLINIC | Age: 50
End: 2020-03-13

## 2020-03-13 DIAGNOSIS — N39.0 RECURRENT UTI: Primary | ICD-10-CM

## 2020-03-13 NOTE — TELEPHONE ENCOUNTER
Call to pt and advised. She agrees with UA this time and next time she will do Evisit. Scheduled lab appt.

## 2020-03-13 NOTE — TELEPHONE ENCOUNTER
Patient is calling because she thinks she has a UTI. SHe is asking for Dr Lincoln to order a UA. Writer informed patient that she should request a virtual visit on her my chart bu she she the doctor has ordered this for her before and she has a very complex situation saying she gets UTI's every couple of weeks.

## 2020-03-14 DIAGNOSIS — F31.9 BIPOLAR I DISORDER (H): ICD-10-CM

## 2020-03-16 NOTE — TELEPHONE ENCOUNTER
Requested Prescriptions   Pending Prescriptions Disp Refills     cyclobenzaprine (FLEXERIL) 10 MG tablet [Pharmacy Med Name: CYCLOBENZAPRINE HYDROCHLORIDE 10 MG Tablet] 90 tablet 0     Sig: TAKE 1 TABLET AT BEDTIME       There is no refill protocol information for this order      Last Written Prescription Date:  01/13/2020  Last Fill Quantity: 90,  # refills: 0   Last office visit: 2/11/2020 with prescribing provider:     Future Office Visit:

## 2020-03-17 RX ORDER — CYCLOBENZAPRINE HCL 10 MG
TABLET ORAL
Qty: 90 TABLET | Refills: 0 | OUTPATIENT
Start: 2020-03-17

## 2020-03-17 NOTE — TELEPHONE ENCOUNTER
Flexeril refill request.     Last refill on 1/13/20 for #90 with 0 refills.     This is early refill. She should not be due  For this until April.

## 2020-03-18 ENCOUNTER — E-VISIT (OUTPATIENT)
Dept: INTERNAL MEDICINE | Facility: CLINIC | Age: 50
End: 2020-03-18
Payer: COMMERCIAL

## 2020-03-18 DIAGNOSIS — R30.0 DYSURIA: Primary | ICD-10-CM

## 2020-03-18 PROCEDURE — 99422 OL DIG E/M SVC 11-20 MIN: CPT | Performed by: INTERNAL MEDICINE

## 2020-03-18 NOTE — TELEPHONE ENCOUNTER
Provider E-Visit time total (minutes): 14    Presents with sympoms of urinary frequency, burning and paini. Present for 7 days. No  suprapubic and flank tenderness. No fevers, chills. No nausea, vomiting. Has prior prior history of UTIs.   Has been treated with antibiotic for URI 2 weeks ago. Took Nitrofurantoin for 3 days.   Previous tests were showing UC - mixed urogenital mina.   From 11/26/19 UC - citrobacter was isolated.     A/P: clinically recurrent UTI.   Because of inconclusive UA, UC results in the past will obtain repeated UA, UC and decide on treatment. Consider longer / preventive antibiotic according to sensitivities.

## 2020-03-30 DIAGNOSIS — F31.9 BIPOLAR I DISORDER (H): ICD-10-CM

## 2020-03-30 RX ORDER — CYCLOBENZAPRINE HCL 10 MG
10 TABLET ORAL AT BEDTIME
Qty: 90 TABLET | Refills: 0 | Status: SHIPPED | OUTPATIENT
Start: 2020-03-30 | End: 2020-07-14

## 2020-03-30 RX ORDER — MIRTAZAPINE 30 MG/1
60 TABLET, FILM COATED ORAL AT BEDTIME
Qty: 180 TABLET | Refills: 0 | Status: SHIPPED | OUTPATIENT
Start: 2020-03-30 | End: 2020-06-23

## 2020-03-30 NOTE — TELEPHONE ENCOUNTER
Patient is calling to get this filled. She says she has one week left and she uses a mail order pharmacy.

## 2020-04-01 DIAGNOSIS — F31.9 BIPOLAR I DISORDER (H): ICD-10-CM

## 2020-04-02 RX ORDER — OLANZAPINE 10 MG/1
TABLET ORAL
Qty: 180 TABLET | OUTPATIENT
Start: 2020-04-02

## 2020-04-10 ENCOUNTER — VIRTUAL VISIT (OUTPATIENT)
Dept: INTERNAL MEDICINE | Facility: CLINIC | Age: 50
End: 2020-04-10
Payer: COMMERCIAL

## 2020-04-10 DIAGNOSIS — N30.00 ACUTE CYSTITIS WITHOUT HEMATURIA: Primary | ICD-10-CM

## 2020-04-10 PROCEDURE — 99213 OFFICE O/P EST LOW 20 MIN: CPT | Mod: 95 | Performed by: INTERNAL MEDICINE

## 2020-04-10 RX ORDER — AMOXICILLIN 500 MG/1
500 CAPSULE ORAL 3 TIMES DAILY
Qty: 21 CAPSULE | Refills: 0 | Status: SHIPPED | OUTPATIENT
Start: 2020-04-10 | End: 2020-07-20

## 2020-04-10 ASSESSMENT — ENCOUNTER SYMPTOMS
HEMATURIA: 0
DYSURIA: 1
CHILLS: 0
FREQUENCY: 1
FEVER: 0

## 2020-04-10 ASSESSMENT — PATIENT HEALTH QUESTIONNAIRE - PHQ9: SUM OF ALL RESPONSES TO PHQ QUESTIONS 1-9: 15

## 2020-04-10 NOTE — PROGRESS NOTES
"Arthur Wharton is a 49 year old female who is being evaluated via a billable telephone visit.      The patient has been notified of following:     \"This telephone visit will be conducted via a call between you and your physician/provider. We have found that certain health care needs can be provided without the need for a physical exam.  This service lets us provide the care you need with a short phone conversation.  If a prescription is necessary we can send it directly to your pharmacy.  If lab work is needed we can place an order for that and you can then stop by our lab to have the test done at a later time.    Telephone visits are billed at different rates depending on your insurance coverage. During this emergency period, for some insurers they may be billed the same as an in-person visit.  Please reach out to your insurance provider with any questions.    If during the course of the call the physician/provider feels a telephone visit is not appropriate, you will not be charged for this service.\"    Patient has given verbal consent for Telephone visit?  Yes    How would you like to obtain your AVS? Cat    Ana Laura Wharton complains of   Chief Complaint   Patient presents with     Urinary Problem     Telephone visit- Pt C/O urinary frequency, mild pain and fould odor for a week  (  PHQ-9 was 15)     Patient has recurrent urinary tract infection in the past.  Had an ED visit with primary care 2 weeks ago and recommended urine analysis but patient was reluctant to come due to coronavirus pandemic.  Has been complaining of worsening of symptoms for last 1 week.  More frequency and mild abdominal discomfort.  Foul-smelling urine.  Denies blood in it.  Patient usually responds better to amoxicillin and prefer that.    ALLERGIES  Amoxicillin-pot clavulanate; Azithromycin; Cephalexin; Compazine [prochlorperazine]; Metoclopramide; Minocycline; Penicillins; Reglan [metoclopramide hcl]; Restoril " [temazepam]; Thorazine [chlorpromazine]; Abilify [aripiprazole]; Lamotrigine; and Sulfa drugs    Current Outpatient Medications   Medication Sig Dispense Refill     albuterol (PROAIR HFA/PROVENTIL HFA/VENTOLIN HFA) 108 (90 Base) MCG/ACT inhaler Inhale 2 puffs into the lungs every 6 hours as needed for shortness of breath / dyspnea or wheezing 1 Inhaler 0     amLODIPine (NORVASC) 5 MG tablet Take 1 tablet (5 mg) by mouth daily 90 tablet 3     atorvastatin (LIPITOR) 10 MG tablet TAKE 1 TABLET EVERY DAY 90 tablet 3     carvedilol (COREG) 12.5 MG tablet Take 1 tablet (12.5 mg) by mouth 2 times daily (with meals) 180 tablet 3     citalopram (CELEXA) 20 MG tablet Take 1 tablet (20 mg) by mouth daily 90 tablet 1     clonazePAM (KLONOPIN) 1 MG tablet TAKE 1 TABLET TWICE DAILY AND 2 TABLETS AT BEDTIME 120 tablet 1     cyclobenzaprine (FLEXERIL) 10 MG tablet Take 1 tablet (10 mg) by mouth At Bedtime 90 tablet 0     eletriptan (RELPAX) 40 MG tablet Take 40 mg by mouth at onset of headache for migraine       FIBER PO Take 2 tablets by mouth daily       fluticasone (FLONASE) 50 MCG/ACT nasal spray SHAKE LIQUID AND USE 1 SPRAY IN EACH NOSTRIL DAILY 48 g 1     folic acid (FOLVITE) 400 MCG tablet Take 400 mcg by mouth daily       hydrOXYzine (VISTARIL) 50 MG capsule Take 1 cap BID and 1 cap PRN for anxiety 270 capsule 0     ibuprofen (ADVIL/MOTRIN) 400 MG tablet Take 1 tablet (400 mg) by mouth 3 times daily 30 tablet 1     Levonorgestrel, [id number], ([employer] [place])        levothyroxine (SYNTHROID/LEVOTHROID) 50 MCG tablet TAKE 1 TABLET EVERY DAY 90 tablet 3     mirtazapine (REMERON) 30 MG tablet Take 2 tablets (60 mg) by mouth At Bedtime 180 tablet 0     Multiple Vitamins-Minerals (WOMENS MULTI VITAMIN & MINERAL PO) Take 1 tablet by mouth daily       OLANZapine (ZYPREXA) 10 MG tablet Take 1 tablet (10 mg) by mouth At Bedtime 90 tablet 1     OLANZapine (ZYPREXA) 5 MG tablet Take 1 tab (5 mg) with 10 mg tablet at bedtime. May  take 5 mg daily PRN for anxiety. 180 tablet 0     ondansetron (ZOFRAN) 4 MG tablet Take 1 tablet (4 mg) by mouth every 8 hours as needed for nausea 30 tablet 0     pantoprazole (PROTONIX) 40 MG EC tablet Take 1 tablet (40 mg) by mouth every morning (before breakfast) 90 tablet 3     senna (SENOKOT) 8.6 MG tablet Take 1 tablet by mouth 2 times daily as needed for constipation 60 tablet 3     sodium chloride (OCEAN) 0.65 % nasal spray Use in both nostrils 3 times a day. 30 mL 1       Reviewed and updated as needed this visit by Provider       Review of Systems   Constitutional: Negative for chills and fever.   Genitourinary: Positive for dysuria and frequency. Negative for hematuria.        Assessment/Plan:  1. Acute cystitis without hematuria    - amoxicillin (AMOXIL) 500 MG capsule; Take 1 capsule (500 mg) by mouth 3 times daily for 7 days  Dispense: 21 capsule; Refill: 0    Advised to call if her symptoms are not getting better.  She may need a urine analysis before giving another antibiotic.    Phone call duration:  5 minutes    Krissy Morton MD

## 2020-04-16 DIAGNOSIS — F31.9 BIPOLAR I DISORDER (H): ICD-10-CM

## 2020-04-16 DIAGNOSIS — F41.9 ANXIETY DISORDER: ICD-10-CM

## 2020-04-16 RX ORDER — OLANZAPINE 5 MG/1
TABLET ORAL
Qty: 180 TABLET | Refills: 0 | Status: SHIPPED | OUTPATIENT
Start: 2020-04-16 | End: 2020-06-22

## 2020-04-16 RX ORDER — OLANZAPINE 10 MG/1
10 TABLET ORAL AT BEDTIME
Qty: 90 TABLET | Refills: 0 | Status: SHIPPED | OUTPATIENT
Start: 2020-04-16 | End: 2020-06-22

## 2020-04-27 ENCOUNTER — TELEPHONE (OUTPATIENT)
Dept: INTERNAL MEDICINE | Facility: CLINIC | Age: 50
End: 2020-04-27

## 2020-04-27 DIAGNOSIS — N39.0 URINARY TRACT INFECTION WITHOUT HEMATURIA, SITE UNSPECIFIED: Primary | ICD-10-CM

## 2020-04-27 NOTE — TELEPHONE ENCOUNTER
Patient calls with symptoms of burning, urgency, foul odor and frequency with urination for 2 weeks. Please call patient [phone].

## 2020-04-27 NOTE — TELEPHONE ENCOUNTER
Patient calling again. She is in a lot of pain and would like a urine test or she is ok with being seen. Please advise

## 2020-04-27 NOTE — TELEPHONE ENCOUNTER
Patient calling back.  C/o dysuria, frequency, odor to urine, low back pain and abd pain x 3 wks.    Done taking Amox 500mg TID for acute cystitis (started 4-10-20 thru 4-17-20).  States symptoms returned 2 days after done with antibiotic.    Denies hematuria or fever.    Next step?    Virtual visit 4-10-20    Lab only for UA?  There are future orders in chart.

## 2020-04-28 DIAGNOSIS — R30.0 DYSURIA: ICD-10-CM

## 2020-04-28 DIAGNOSIS — R82.90 NONSPECIFIC FINDING ON EXAMINATION OF URINE: Primary | ICD-10-CM

## 2020-04-28 LAB
ALBUMIN UR-MCNC: NEGATIVE MG/DL
APPEARANCE UR: CLEAR
BACTERIA #/AREA URNS HPF: ABNORMAL /HPF
BILIRUB UR QL STRIP: NEGATIVE
COLOR UR AUTO: YELLOW
GLUCOSE UR STRIP-MCNC: NEGATIVE MG/DL
HGB UR QL STRIP: ABNORMAL
KETONES UR STRIP-MCNC: NEGATIVE MG/DL
LEUKOCYTE ESTERASE UR QL STRIP: ABNORMAL
NITRATE UR QL: POSITIVE
NON-SQ EPI CELLS #/AREA URNS LPF: ABNORMAL /LPF
PH UR STRIP: 7 PH (ref 5–7)
RBC #/AREA URNS AUTO: ABNORMAL /HPF
SOURCE: ABNORMAL
SP GR UR STRIP: 1.01 (ref 1–1.03)
UROBILINOGEN UR STRIP-ACNC: 1 EU/DL (ref 0.2–1)
WBC #/AREA URNS AUTO: ABNORMAL /HPF

## 2020-04-28 PROCEDURE — 87088 URINE BACTERIA CULTURE: CPT | Performed by: INTERNAL MEDICINE

## 2020-04-28 PROCEDURE — 87186 SC STD MICRODIL/AGAR DIL: CPT | Performed by: INTERNAL MEDICINE

## 2020-04-28 PROCEDURE — 87086 URINE CULTURE/COLONY COUNT: CPT | Performed by: INTERNAL MEDICINE

## 2020-04-28 PROCEDURE — 81001 URINALYSIS AUTO W/SCOPE: CPT | Performed by: INTERNAL MEDICINE

## 2020-04-29 ENCOUNTER — MYC MEDICAL ADVICE (OUTPATIENT)
Dept: INTERNAL MEDICINE | Facility: CLINIC | Age: 50
End: 2020-04-29

## 2020-04-29 RX ORDER — CIPROFLOXACIN 500 MG/1
500 TABLET, FILM COATED ORAL 2 TIMES DAILY
Qty: 20 TABLET | Refills: 0 | Status: SHIPPED | OUTPATIENT
Start: 2020-04-29 | End: 2020-07-17

## 2020-04-29 NOTE — TELEPHONE ENCOUNTER
Patient saw results of UA in [facility] and wants to know what treatment she will be given. Call her back at [phone] (home)

## 2020-04-29 NOTE — TELEPHONE ENCOUNTER
UA showed urinary infection. Culture is growing E coli, but the sensitivity is not ready yet.   I will call in to start on Cipro, and if needed will change it according to the culture results.

## 2020-04-30 ENCOUNTER — VIRTUAL VISIT (OUTPATIENT)
Dept: PSYCHIATRY | Facility: CLINIC | Age: 50
End: 2020-04-30
Attending: PSYCHIATRY & NEUROLOGY
Payer: COMMERCIAL

## 2020-04-30 DIAGNOSIS — F31.9 BIPOLAR I DISORDER (H): Primary | ICD-10-CM

## 2020-04-30 LAB
BACTERIA SPEC CULT: ABNORMAL
SPECIMEN SOURCE: ABNORMAL

## 2020-04-30 NOTE — TELEPHONE ENCOUNTER
Pt calling requesting different antibiotic than Cipro. She stated that Cipro has not ever worked for her in the past. Please advise. Thanks.

## 2020-05-30 NOTE — PROGRESS NOTES
"Mood has been up and down Celexa because \"major linda\" so she stopped it and is feeling a lot better.  She finds it hard to deal with COVID-19 with the isolation seeing her friends.  She has situational dysphoria.  She has no suicide thoughts or SIB urges.  She has a good support network which she sees via Internet video.  She spends time cleaning she has not been using any alcohol.  She is sleeping well appetite is poor.  She has lost weight but works out all the time.      Current medications are clonazepam 1 mg twice daily and 2 mg nightly and Zyprexa 10mg.    Side effects: Possible weight gain and dry mouth.    Current medical status: This is the eighth urinary tract infection she has had since the beginning of the year.  She is underweight.  Her C. difficile has improved.  Otherwise comprehensive review of systems is negative other than as noted previously.    Diagnosis bipolar affective disorder type I currently mildly depressed but this is primarily situational.      Plan there will be no change in her medications today.  She has not done well with mood stabilizers in the past she has had excessive side effects.  The current medications seems to be the most effective.  She takes the clonazepam for severe anxiety.  If she does not take the clonazepam the anxiety interferes with her sleep, and if she does not sleep she becomes manic.    Total time 22 minutes.    Jaden Rodriguez MD      "

## 2020-06-22 ENCOUNTER — MYC MEDICAL ADVICE (OUTPATIENT)
Dept: PSYCHIATRY | Facility: CLINIC | Age: 50
End: 2020-06-22

## 2020-06-22 DIAGNOSIS — F31.9 BIPOLAR I DISORDER (H): ICD-10-CM

## 2020-06-22 DIAGNOSIS — F41.9 ANXIETY DISORDER: ICD-10-CM

## 2020-06-22 RX ORDER — CLONAZEPAM 1 MG/1
TABLET ORAL
Qty: 120 TABLET | Refills: 2
Start: 2020-06-22 | End: 2020-08-20

## 2020-06-22 RX ORDER — OLANZAPINE 10 MG/1
10 TABLET ORAL AT BEDTIME
Qty: 90 TABLET | Refills: 0 | Status: SHIPPED | OUTPATIENT
Start: 2020-06-22 | End: 2020-08-20

## 2020-06-22 RX ORDER — OLANZAPINE 5 MG/1
TABLET ORAL
Qty: 180 TABLET | Refills: 0 | Status: SHIPPED | OUTPATIENT
Start: 2020-06-22 | End: 2020-08-20

## 2020-06-22 NOTE — TELEPHONE ENCOUNTER
Last seen: 4/30/20  RTC: uncertain  Cancel: none  No-show: none  Next appt: none     Medication requested: clonazePAM (KLONOPIN) 1 MG tablet   Directions: TAKE 1 TABLET TWICE DAILY AND 2 TABLETS AT BEDTIME  Qty: 120  Last refilled: 4/6/20, 12/2/19, and 10/17/19 per [place]     Medication requested: OLANZapine (ZYPREXA) 10 MG tablet  Directions: Take 1 tablet (10 mg) by mouth At Bedtime  Qty: 90  Last refilled: approximately 4/16/20 per med tab      Medication requested: OLANZapine (ZYPREXA) 5 MG tablet   Directions: Take 1 tab (5 mg) with 10 mg tablet at bedtime. May take 5 mg daily PRN for anxiety.   Qty: 180  Last refilled: approximately 4/16/20 per med tab      Medication refill approved per refill protocol    90 d/s of all medications sent to pt's preferred pharmacy. Kl[phone]. Med tab changed to reflect this.    Called pt and notified her of the refill per her request.

## 2020-06-22 NOTE — TELEPHONE ENCOUNTER
Health Call Center    Phone Message    May a detailed message be left on voicemail: yes     Reason for Call: Medication Refill Request    Has the patient contacted the pharmacy for the refill? Yes   Name of medication being requested: olanzapine 5mg, olanzapine 10mg, clonazepam 1mg  Provider who prescribed the medication: Jaden Rodriguez MD  Pharmacy: [facility] - [place] [id number] [facility]  Date medication is needed: Unclear    Patient requested a phone call from Dr. Rodriguez's nurse partner about this request.    Patient is also requesting a 3 month supply of the medications.      Action Taken: Message routed to:  Other: Nursing pool    Travel Screening: Not Applicable

## 2020-06-23 RX ORDER — MIRTAZAPINE 30 MG/1
60 TABLET, FILM COATED ORAL AT BEDTIME
Qty: 180 TABLET | Refills: 0 | Status: SHIPPED | OUTPATIENT
Start: 2020-06-23 | End: 2020-08-20

## 2020-06-23 NOTE — TELEPHONE ENCOUNTER
Last seen: 4/30/20  RTC: uncertain  Cancel: none  No-show: none  Next appt: 8/20/20    Incoming refill from pharmacy via electronic request     Medication requested: mirtazapine (REMERON) 30 MG tablet   Directions: Take 2 tablets (60 mg) by mouth At Bedtime  Qty: 180  Last refilled: approximately 3/30/20 for 90 d/s per med tab

## 2020-07-13 DIAGNOSIS — F31.9 BIPOLAR I DISORDER (H): ICD-10-CM

## 2020-07-14 RX ORDER — CYCLOBENZAPRINE HCL 10 MG
10 TABLET ORAL AT BEDTIME
Qty: 90 TABLET | Refills: 0 | Status: SHIPPED | OUTPATIENT
Start: 2020-07-14 | End: 2020-09-04

## 2020-07-14 NOTE — TELEPHONE ENCOUNTER
Pending Prescriptions:                       Disp   Refills    cyclobenzaprine (FLEXERIL) 10 MG tablet [P*90 tab*0        Sig: Take 1 tablet (10 mg) by mouth At Bedtime    Routing refill request to provider for review/approval because:  Drug not on the [facility] refill protocol

## 2020-07-17 ENCOUNTER — VIRTUAL VISIT (OUTPATIENT)
Dept: INTERNAL MEDICINE | Facility: CLINIC | Age: 50
End: 2020-07-17
Payer: COMMERCIAL

## 2020-07-17 DIAGNOSIS — S90.869A INSECT BITE OF FOOT, UNSPECIFIED LATERALITY, INITIAL ENCOUNTER: Primary | ICD-10-CM
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
## 2020-07-17 PROCEDURE — 99213 OFFICE O/P EST LOW 20 MIN: CPT | Mod: 95 | Performed by: INTERNAL MEDICINE

## 2020-07-17 NOTE — PROGRESS NOTES
"Ana Laura Wharton is a 49 year old female who is being evaluated via a billable video visit.      The patient has been notified of following:     \"This video visit will be conducted via a call between you and your physician/provider. We have found that certain health care needs can be provided without the need for an in-person physical exam.  This service lets us provide the care you need with a video conversation.  If a prescription is necessary we can send it directly to your pharmacy.  If lab work is needed we can place an order for that and you can then stop by our lab to have the test done at a later time.    Video visits are billed at different rates depending on your insurance coverage.  Please reach out to your insurance provider with any questions.    If during the course of the call the physician/provider feels a video visit is not appropriate, you will not be charged for this service.\"    Patient has given verbal consent for Video visit? Yes  How would you like to obtain your AVS? Mail a copy  If you are dropped from the video visit, the video invite should be resent to: Text to cell phone: [phone]  Will anyone else be joining your video visit? No    Subjective     Ana Laura Wharton is a 49 year old female who presents today via video visit for the following health issues:  Patient is being seen for possible chiggers.  HPI     Presents with skin rash, insect bite marks, papules, on the legs, ankles, toes, some no the body. For a week.   Concern for chiggers.   No bleeding, no blisters, no surrounding erythema.   Has significant itching. Has been taking Benadryl 50 mg tid. Helps.              Video Start Time: 1:47 PM        Patient Active Problem List   Diagnosis     Depressed bipolar I disorder in full remission (H)     Anxiety disorder     Suicide ideation     Bipolar disorder current episode depressed (H)     Overdose     Depression     Mood disorder (H)     Suicidal ideations     Bipolar I disorder " (H)     Acute renal failure (H)     Alternating exotropia     Anxiety state     Other bipolar disorders     Personality disorder (H)     Tear film insufficiency     Dysfunctions associated with sleep stages or arousal from sleep     Abnormal finding of blood chemistry     Esophageal reflux     Other specified hypothyroidism     Impulse control disorder     Acidosis     Low back pain     Myopia     Opioid dependence (H)     Orofacial dyskinesia     Intractable migraine     Pure hypercholesterolemia     Poisoning by 4-aminophenol derivatives, undetermined intent     Essential hypertension     Left knee pain     Aftercare following surgery of the musculoskeletal system     UTI (urinary tract infection)     Acute cystitis     Clostridium difficile colitis     Drug overdose     Calcium channel blocker overdose     Intentional acetaminophen overdose (H)     Alcoholic intoxication with complication (H)     Hypotension due to medication     Acute renal failure, unspecified acute renal failure type (H)     Paroxysmal atrial fibrillation (H)     Acute respiratory failure with hypoxia (H)     Acute pulmonary edema (H)     Spontaneous pneumothorax     PRES (posterior reversible encephalopathy syndrome)     Pancreatitis     Atypical squamous cells cannot exclude high grade squamous intraepithelial lesion on cytologic smear of cervix (ASC-H)     Past Surgical History:   Procedure Laterality Date     ARTHROSCOPY KNEE      L knee     CERVIX SURGERY      for pre-cancerous changes     left knee surgery       ORTHOPEDIC SURGERY      L shoulder     THORACIC SURGERY      for pneumothorax, pleurodesis and lobe resection       Social History     Tobacco Use     Smoking status: Current Every Day Smoker     Packs/day: 0.50     Types: Cigarettes     Smokeless tobacco: Never Used   Substance Use Topics     Alcohol use: Yes     Alcohol/week: 2.0 standard drinks     Types: 2 Glasses of wine per week     Comment: 1 glass of wine per week      Family History   Problem Relation Age of Onset     Depression Mother      Lipids Father         hyperlipidemia     Macular Degeneration Father      No Known Problems Brother      No Known Problems Son          Current Outpatient Medications   Medication Sig Dispense Refill     albuterol (PROAIR HFA/PROVENTIL HFA/VENTOLIN HFA) 108 (90 Base) MCG/ACT inhaler Inhale 2 puffs into the lungs every 6 hours as needed for shortness of breath / dyspnea or wheezing 1 Inhaler 0     amLODIPine (NORVASC) 5 MG tablet Take 1 tablet (5 mg) by mouth daily 90 tablet 3     amoxicillin (AMOXIL) 500 MG capsule Take 1 capsule (500 mg) by mouth 3 times daily for 7 days 21 capsule 0     atorvastatin (LIPITOR) 10 MG tablet TAKE 1 TABLET EVERY DAY 90 tablet 3     carvedilol (COREG) 12.5 MG tablet Take 1 tablet (12.5 mg) by mouth 2 times daily (with meals) 180 tablet 3     clonazePAM (KLONOPIN) 1 MG tablet TAKE 1 TABLET TWICE DAILY AND 2 TABLETS AT BEDTIME 120 tablet 2     cyclobenzaprine (FLEXERIL) 10 MG tablet TAKE 1 TABLET (10 MG) BY MOUTH AT BEDTIME 90 tablet 0     eletriptan (RELPAX) 40 MG tablet Take 40 mg by mouth at onset of headache for migraine       FIBER PO Take 2 tablets by mouth daily       fluticasone (FLONASE) 50 MCG/ACT nasal spray SHAKE LIQUID AND USE 1 SPRAY IN EACH NOSTRIL DAILY 48 g 1     folic acid (FOLVITE) 400 MCG tablet Take 400 mcg by mouth daily       hydrOXYzine (VISTARIL) 50 MG capsule Take 1 cap BID and 1 cap PRN for anxiety 270 capsule 0     ibuprofen (ADVIL/MOTRIN) 400 MG tablet Take 1 tablet (400 mg) by mouth 3 times daily 30 tablet 1     Levonorgestrel, [phone], ([employer] [facility] [place])        levothyroxine (SYNTHROID/LEVOTHROID) 50 MCG tablet TAKE 1 TABLET EVERY DAY 90 tablet 3     mirtazapine (REMERON) 30 MG tablet TAKE 2 TABLETS (60 MG) BY MOUTH AT BEDTIME 180 tablet 0     Multiple Vitamins-Minerals (WOMENS MULTI VITAMIN & MINERAL PO) Take 1 tablet by mouth daily       OLANZapine (ZYPREXA) 10 MG tablet Take 1  tablet (10 mg) by mouth At Bedtime 90 tablet 0     OLANZapine (ZYPREXA) 5 MG tablet Take 1 tab (5 mg) with 10 mg tablet at bedtime. May take 5 mg daily PRN for anxiety. 180 tablet 0     ondansetron (ZOFRAN) 4 MG tablet Take 1 tablet (4 mg) by mouth every 8 hours as needed for nausea 30 tablet 0     pantoprazole (PROTONIX) 40 MG EC tablet Take 1 tablet (40 mg) by mouth every morning (before breakfast) 90 tablet 3     senna (SENOKOT) 8.6 MG tablet Take 1 tablet by mouth 2 times daily as needed for constipation 60 tablet 3     sodium chloride (OCEAN) 0.65 % nasal spray Use in both nostrils 3 times a day. 30 mL 1       Reviewed and updated as needed this visit by Provider         Review of Systems   Constitutional, HEENT, cardiovascular, pulmonary, gi and gu systems are negative, except as otherwise noted.      Objective             Physical Exam     GENERAL: Healthy, alert and no distress  EYES: Eyes grossly normal to inspection.  No discharge or erythema, or obvious scleral/conjunctival abnormalities.  RESP: No audible wheeze, cough, or visible cyanosis.  No visible retractions or increased work of breathing.    SKIN: Visible skin clear.  Maculopapular 2-3 mm diameter skin lesions on the feet and ankles grouped in 3-4 , scattered   NEURO: Cranial nerves grossly intact.  Mentation and speech appropriate for age.  PSYCH: Mentation appears normal, affect normal/bright, judgement and insight intact, normal speech and appearance well-groomed.      Diagnostic Test Results:  Labs reviewed in Epic        Assessment & Plan   Problem List Items Addressed This Visit     None      Visit Diagnoses     Insect bite of foot, unspecified laterality, initial encounter    -  Primary                 See Patient Instructions  Return in about 1 week (around 7/24/2020) for follow up on acute problem if persists.    Liborio Lincoln MD  [facility]      Video-Visit Details    Type of service:  Video Visit    Video End  Time:1:55 PM    Originating Location (pt. Location): Home    Distant Location (provider location):  [facility]     Platform used for Video Visit: Doximity    Possible mosquito , chiggers bites, will treat symptomatically with topical steroid - Kenalog 0.1 % bid for a week, she has it at home  Continue Benadryl  Avoid scratching         Liborio Lincoln MD

## 2020-07-20 ENCOUNTER — VIRTUAL VISIT (OUTPATIENT)
Dept: INTERNAL MEDICINE | Facility: CLINIC | Age: 50
End: 2020-07-20
Payer: COMMERCIAL

## 2020-07-20 DIAGNOSIS — L50.9 HIVES: Primary | ICD-10-CM

## 2020-07-20 DIAGNOSIS — R09.81 NASAL CONGESTION: ICD-10-CM

## 2020-07-20 PROCEDURE — 99213 OFFICE O/P EST LOW 20 MIN: CPT | Mod: 95 | Performed by: NURSE PRACTITIONER

## 2020-07-20 RX ORDER — METHYLPREDNISOLONE 4 MG
4 TABLET, DOSE PACK ORAL DAILY
Qty: 21 TABLET | Refills: 0 | Status: SHIPPED | OUTPATIENT
Start: 2020-07-20 | End: 2020-07-29

## 2020-07-20 NOTE — TELEPHONE ENCOUNTER
"Requested Prescriptions   Pending Prescriptions Disp Refills     fluticasone (FLONASE) 50 MCG/ACT nasal spray  Last Written Prescription Date:  01/13/20  Last Fill Quantity: 48 g,  # refills: 1   Last office visit: 2/11/2020 with prescribing provider:  JOEY 07/17/20   Future Office Visit:           48 g 1       Nasal Allergy Protocol Passed - 7/20/2020  8:05 AM        Passed - Patient is age 12 or older        Passed - Recent (12 mo) or future (30 days) visit within the authorizing provider's specialty     Patient has had an office visit with the authorizing provider or a provider within the authorizing providers department within the previous 12 mos or has a future within next 30 days. See \"Patient Info\" tab in inbasket, or \"Choose Columns\" in Meds & Orders section of the refill encounter.              Passed - Medication is active on med list             "

## 2020-07-20 NOTE — PROGRESS NOTES
"Ana Laura Wharton is a 49 year old female who is being evaluated via a billable video visit.      The patient has been notified of following:     \"This video visit will be conducted via a call between you and your physician/provider. We have found that certain health care needs can be provided without the need for an in-person physical exam.  This service lets us provide the care you need with a video conversation.  If a prescription is necessary we can send it directly to your pharmacy.  If lab work is needed we can place an order for that and you can then stop by our lab to have the test done at a later time.    Video visits are billed at different rates depending on your insurance coverage.  Please reach out to your insurance provider with any questions.    If during the course of the call the physician/provider feels a video visit is not appropriate, you will not be charged for this service.\"    Patient has given verbal consent for Video visit? Yes  How would you like to obtain your AVS? MyChart  If you are dropped from the video visit, the video invite should be resent to: Text to cell phone: [phone]  Will anyone else be joining your video visit? No      Subjective     Ana Laura Wharton is a 49 year old female who presents today via video visit for the following health issues:    HPI    Rash x 1 week        Video Start Time: 4:01 PM; unable to get video to work so changed to telephone visit so unable to see the rash    See above.  Saw another provider on 7/17/2020 for insect bite re: rash on legs, ankles, toes x 1 week.  Treated with Kenalog topical and oral Benadryl.  States spreading from ankle up to thigh...looks hives and they itch.    Patient Active Problem List   Diagnosis     Depressed bipolar I disorder in full remission (H)     Anxiety disorder     Suicide ideation     Bipolar disorder current episode depressed (H)     Overdose     Depression     Mood disorder (H)     Suicidal ideations     Bipolar I " disorder (H)     Acute renal failure (H)     Alternating exotropia     Anxiety state     Other bipolar disorders     Personality disorder (H)     Tear film insufficiency     Dysfunctions associated with sleep stages or arousal from sleep     Abnormal finding of blood chemistry     Esophageal reflux     Other specified hypothyroidism     Impulse control disorder     Acidosis     Low back pain     Myopia     Opioid dependence (H)     Orofacial dyskinesia     Intractable migraine     Pure hypercholesterolemia     Poisoning by 4-aminophenol derivatives, undetermined intent     Essential hypertension     Left knee pain     Aftercare following surgery of the musculoskeletal system     UTI (urinary tract infection)     Acute cystitis     Clostridium difficile colitis     Drug overdose     Calcium channel blocker overdose     Intentional acetaminophen overdose (H)     Alcoholic intoxication with complication (H)     Hypotension due to medication     Acute renal failure, unspecified acute renal failure type (H)     Paroxysmal atrial fibrillation (H)     Acute respiratory failure with hypoxia (H)     Acute pulmonary edema (H)     Spontaneous pneumothorax     PRES (posterior reversible encephalopathy syndrome)     Pancreatitis     Atypical squamous cells cannot exclude high grade squamous intraepithelial lesion on cytologic smear of cervix (ASC-H)     Past Surgical History:   Procedure Laterality Date     ARTHROSCOPY KNEE      L knee     CERVIX SURGERY      for pre-cancerous changes     left knee surgery       ORTHOPEDIC SURGERY      L shoulder     THORACIC SURGERY      for pneumothorax, pleurodesis and lobe resection       Social History     Tobacco Use     Smoking status: Current Every Day Smoker     Packs/day: 0.50     Types: Cigarettes     Smokeless tobacco: Never Used   Substance Use Topics     Alcohol use: Yes     Alcohol/week: 2.0 standard drinks     Types: 2 Glasses of wine per week     Comment: 1 glass of wine per  week     Family History   Problem Relation Age of Onset     Depression Mother      Lipids Father         hyperlipidemia     Macular Degeneration Father      No Known Problems Brother      No Known Problems Son          Current Outpatient Medications   Medication Sig Dispense Refill     albuterol (PROAIR HFA/PROVENTIL HFA/VENTOLIN HFA) 108 (90 Base) MCG/ACT inhaler Inhale 2 puffs into the lungs every 6 hours as needed for shortness of breath / dyspnea or wheezing 1 Inhaler 0     amLODIPine (NORVASC) 5 MG tablet Take 1 tablet (5 mg) by mouth daily 90 tablet 3     atorvastatin (LIPITOR) 10 MG tablet TAKE 1 TABLET EVERY DAY 90 tablet 3     carvedilol (COREG) 12.5 MG tablet Take 1 tablet (12.5 mg) by mouth 2 times daily (with meals) 180 tablet 3     clonazePAM (KLONOPIN) 1 MG tablet TAKE 1 TABLET TWICE DAILY AND 2 TABLETS AT BEDTIME 120 tablet 2     cyclobenzaprine (FLEXERIL) 10 MG tablet TAKE 1 TABLET (10 MG) BY MOUTH AT BEDTIME 90 tablet 0     eletriptan (RELPAX) 40 MG tablet Take 40 mg by mouth at onset of headache for migraine       FIBER PO Take 2 tablets by mouth daily       fluticasone (FLONASE) 50 MCG/ACT nasal spray SHAKE LIQUID AND USE 1 SPRAY IN EACH NOSTRIL DAILY 48 g 1     folic acid (FOLVITE) 400 MCG tablet Take 400 mcg by mouth daily       hydrOXYzine (VISTARIL) 50 MG capsule Take 1 cap BID and 1 cap PRN for anxiety 270 capsule 0     ibuprofen (ADVIL/MOTRIN) 400 MG tablet Take 1 tablet (400 mg) by mouth 3 times daily 30 tablet 1     Levonorgestrel, [id number], ([email or web address] [facility] [place])        levothyroxine (SYNTHROID/LEVOTHROID) 50 MCG tablet TAKE 1 TABLET EVERY DAY 90 tablet 3     mirtazapine (REMERON) 30 MG tablet TAKE 2 TABLETS (60 MG) BY MOUTH AT BEDTIME 180 tablet 0     Multiple Vitamins-Minerals (WOMENS MULTI VITAMIN & MINERAL PO) Take 1 tablet by mouth daily       OLANZapine (ZYPREXA) 10 MG tablet Take 1 tablet (10 mg) by mouth At Bedtime 90 tablet 0     OLANZapine (ZYPREXA) 5 MG tablet Take 1 tab (5 mg)  "with 10 mg tablet at bedtime. May take 5 mg daily PRN for anxiety. 180 tablet 0     ondansetron (ZOFRAN) 4 MG tablet Take 1 tablet (4 mg) by mouth every 8 hours as needed for nausea 30 tablet 0     pantoprazole (PROTONIX) 40 MG EC tablet Take 1 tablet (40 mg) by mouth every morning (before breakfast) 90 tablet 3     senna (SENOKOT) 8.6 MG tablet Take 1 tablet by mouth 2 times daily as needed for constipation 60 tablet 3     sodium chloride (OCEAN) 0.65 % nasal spray Use in both nostrils 3 times a day. 30 mL 1     Allergies   Allergen Reactions     Amoxicillin-Pot Clavulanate      PN: LW Reaction: Itching, Pruritis     Azithromycin      Other reaction(s): Dermatitis     Cephalexin      PN: LW Reaction: Itching, Pruritis     Compazine [Prochlorperazine] Other (See Comments)     dystonia     Metoclopramide Other (See Comments)     \"I feel like I am crawling out of my skin\"     Minocycline Nausea and Vomiting     PN: DIARRHEA, VOMITING, AND STOMACH CRAMPS     Penicillins Itching     Reglan [Metoclopramide Hcl] Other (See Comments)     Sensation of \"crawling out of skin\"     Restoril [Temazepam]      Thorazine [Chlorpromazine] Other (See Comments)     dystonia     Abilify [Aripiprazole] Rash     Lamotrigine Rash     Severe drug rash - contraindication to receiving again. Skin peeling.      Sulfa Drugs Rash       Reviewed and updated as needed this visit by Provider         Review of Systems   Constitutional, HEENT, cardiovascular, pulmonary, gi and gu systems are negative, except as otherwise noted.      Objective             Physical Exam     GENERAL: alert and no distress  RESP: No audible wheeze, cough, or visible cyanosis.  No visible retractions or increased work of breathing.    SKIN: unable to see skin; denies warmth skin but reports raised areas that itch alot  NEURO:  Mentation and speech appropriate for age.  PSYCH: Mentation appears normal, affect normal/bright, judgement and insight intact, normal " speech      none         Assessment & Plan     1. Hives/Uticaria  - will trial dose pack as directed:    methylPREDNISolone (MEDROL DOSEPAK) 4 MG tablet therapy pack; Take 1 tablet (4 mg) by mouth daily Follow Package Directions  Dispense: 21 tablet; Refill: 0  - if symptoms persist or worsen, may need referral to Dermatologist  - continue with Triamcinolone topical and oral Benadryl for itching       Patient instructions:  See above.    No follow-ups on file.    Jennifer Velázquez CNP  [facility]    Telephone visit time: 12 minutes      No follow-ups on file.       Jennifer Velázquez CNP

## 2020-07-21 ENCOUNTER — TELEPHONE (OUTPATIENT)
Dept: PSYCHIATRY | Facility: CLINIC | Age: 50
End: 2020-07-21

## 2020-07-21 RX ORDER — FLUTICASONE PROPIONATE 50 MCG
SPRAY, SUSPENSION (ML) NASAL
Qty: 48 G | Refills: 1 | Status: SHIPPED | OUTPATIENT
Start: 2020-07-21 | End: 2021-08-13

## 2020-07-21 NOTE — TELEPHONE ENCOUNTER
Writer called [facility] to verify the SIG on the RX Hydroxyzine (Vistril) 50 mg Capsules with Pharmacist Lainey.  Sig will read as Take 1 Capsule twice a day and 1 capsule daily as needed for anxiety, Qty 270, 54 ds.  [facility] will rerun Rx for Pt. .Echo Martinez LPN

## 2020-07-29 ENCOUNTER — VIRTUAL VISIT (OUTPATIENT)
Dept: INTERNAL MEDICINE | Facility: CLINIC | Age: 50
End: 2020-07-29
Payer: COMMERCIAL

## 2020-07-29 DIAGNOSIS — G43.809 OTHER MIGRAINE WITHOUT STATUS MIGRAINOSUS, NOT INTRACTABLE: ICD-10-CM

## 2020-07-29 DIAGNOSIS — I10 ESSENTIAL HYPERTENSION: ICD-10-CM

## 2020-07-29 DIAGNOSIS — E03.8 OTHER SPECIFIED HYPOTHYROIDISM: Primary | ICD-10-CM

## 2020-07-29 PROCEDURE — 99214 OFFICE O/P EST MOD 30 MIN: CPT | Mod: 95 | Performed by: INTERNAL MEDICINE

## 2020-07-29 NOTE — PROGRESS NOTES
"Ana Laura Wharton is a 49 year old female who is being evaluated via a billable video visit.      The patient has been notified of following:     \"This video visit will be conducted via a call between you and your physician/provider. We have found that certain health care needs can be provided without the need for an in-person physical exam.  This service lets us provide the care you need with a video conversation.  If a prescription is necessary we can send it directly to your pharmacy.  If lab work is needed we can place an order for that and you can then stop by our lab to have the test done at a later time.    Video visits are billed at different rates depending on your insurance coverage.  Please reach out to your insurance provider with any questions.    If during the course of the call the physician/provider feels a video visit is not appropriate, you will not be charged for this service.\"    Patient has given verbal consent for Video visit? Yes  How would you like to obtain your AVS? MyChart  If you are dropped from the video visit, the video invite should be resent to: Text to cell phone: [phone]  Will anyone else be joining your video visit? No        Video Start Time: 2:41 pm    Subjective     Ana Laura Wharton is a 49 year old female who presents today via video visit for the following health issues:    HPI    Pt has c/o migraines for many yrs, has seen neurologist in the past, has seen [facility] , pt has been  treated with Tylenol, Relapax 40 mg and Ketoralac 30 mg inj . Pt takes ketoralac by herself at home. Pt says this was prescribed by her previous provider and wants to make a note in the chart .      Hypertension Follow-up      Do you check your blood pressure regularly outside of the clinic? No     Are you following a low salt diet? Yes    Are your blood pressures ever more than 140 on the top number (systolic) OR more   than 90 on the bottom number (diastolic), for example 140/90? No   "   Hypothyroidism Follow-up      Since last visit, patient describes the following symptoms: TSH was abnormal in 01/20, pt c/o some wt gain, no hair loss, no skin changes, no constipation, no loose stools      Depression and Anxiety - Follows up with psychiatrist and on multiple medications         How many servings of fruits and vegetables do you eat daily?  0-1    On average, how many sweetened beverages do you drink each day (Examples: soda, juice, sweet tea, etc.  Do NOT count diet or artificially sweetened beverages)?   2    How many days per week do you exercise enough to make your heart beat faster? 4    How many minutes a day do you exercise enough to make your heart beat faster? 20 - 29    How many days per week do you miss taking your medication? 0         Patient Active Problem List   Diagnosis     Depressed bipolar I disorder in full remission (H)     Anxiety disorder     Suicide ideation     Bipolar disorder current episode depressed (H)     Overdose     Depression     Mood disorder (H)     Suicidal ideations     Bipolar I disorder (H)     Acute renal failure (H)     Alternating exotropia     Anxiety state     Other bipolar disorders     Personality disorder (H)     Tear film insufficiency     Dysfunctions associated with sleep stages or arousal from sleep     Abnormal finding of blood chemistry     Esophageal reflux     Other specified hypothyroidism     Impulse control disorder     Acidosis     Low back pain     Myopia     Opioid dependence (H)     Orofacial dyskinesia     Intractable migraine     Pure hypercholesterolemia     Poisoning by 4-aminophenol derivatives, undetermined intent     Essential hypertension     Left knee pain     Aftercare following surgery of the musculoskeletal system     UTI (urinary tract infection)     Acute cystitis     Clostridium difficile colitis     Drug overdose     Calcium channel blocker overdose     Intentional acetaminophen overdose (H)     Alcoholic intoxication  with complication (H)     Hypotension due to medication     Acute renal failure, unspecified acute renal failure type (H)     Paroxysmal atrial fibrillation (H)     Acute respiratory failure with hypoxia (H)     Acute pulmonary edema (H)     Spontaneous pneumothorax     PRES (posterior reversible encephalopathy syndrome)     Pancreatitis     Atypical squamous cells cannot exclude high grade squamous intraepithelial lesion on cytologic smear of cervix (ASC-H)     Past Surgical History:   Procedure Laterality Date     ARTHROSCOPY KNEE      L knee     CERVIX SURGERY      for pre-cancerous changes     left knee surgery       ORTHOPEDIC SURGERY      L shoulder     THORACIC SURGERY      for pneumothorax, pleurodesis and lobe resection       Social History     Tobacco Use     Smoking status: Current Every Day Smoker     Packs/day: 0.50     Types: Cigarettes     Smokeless tobacco: Never Used   Substance Use Topics     Alcohol use: Yes     Alcohol/week: 2.0 standard drinks     Types: 2 Glasses of wine per week     Comment: 1 glass of wine per week     Family History   Problem Relation Age of Onset     Depression Mother      Pacemaker Mother      Osteoporosis Mother      Lipids Father         hyperlipidemia     Macular Degeneration Father      No Known Problems Brother      No Known Problems Son          Current Outpatient Medications   Medication Sig Dispense Refill     albuterol (PROAIR HFA/PROVENTIL HFA/VENTOLIN HFA) 108 (90 Base) MCG/ACT inhaler Inhale 2 puffs into the lungs every 6 hours as needed for shortness of breath / dyspnea or wheezing 1 Inhaler 0     amLODIPine (NORVASC) 5 MG tablet Take 1 tablet (5 mg) by mouth daily 90 tablet 3     atorvastatin (LIPITOR) 10 MG tablet TAKE 1 TABLET EVERY DAY 90 tablet 3     carvedilol (COREG) 12.5 MG tablet Take 1 tablet (12.5 mg) by mouth 2 times daily (with meals) 180 tablet 3     clonazePAM (KLONOPIN) 1 MG tablet TAKE 1 TABLET TWICE DAILY AND 2 TABLETS AT BEDTIME 120 tablet 2      cyclobenzaprine (FLEXERIL) 10 MG tablet TAKE 1 TABLET (10 MG) BY MOUTH AT BEDTIME 90 tablet 0     eletriptan (RELPAX) 40 MG tablet Take 40 mg by mouth at onset of headache for migraine       FIBER PO Take 2 tablets by mouth daily       fluticasone (FLONASE) 50 MCG/ACT nasal spray SHAKE LIQUID AND USE 1 SPRAY IN EACH NOSTRIL DAILY 48 g 1     folic acid (FOLVITE) 400 MCG tablet Take 400 mcg by mouth daily       hydrOXYzine (VISTARIL) 50 MG capsule Take 1 cap BID and 1 cap PRN for anxiety 270 capsule 0     ibuprofen (ADVIL/MOTRIN) 400 MG tablet Take 1 tablet (400 mg) by mouth 3 times daily 30 tablet 1     Levonorgestrel, [id number], (NORPLANT SYSTEM IL)        levothyroxine (SYNTHROID/LEVOTHROID) 50 MCG tablet TAKE 1 TABLET EVERY DAY 90 tablet 3     mirtazapine (REMERON) 30 MG tablet TAKE 2 TABLETS (60 MG) BY MOUTH AT BEDTIME 180 tablet 0     Multiple Vitamins-Minerals (WOMENS MULTI VITAMIN & MINERAL PO) Take 1 tablet by mouth daily       OLANZapine (ZYPREXA) 10 MG tablet Take 1 tablet (10 mg) by mouth At Bedtime 90 tablet 0     OLANZapine (ZYPREXA) 5 MG tablet Take 1 tab (5 mg) with 10 mg tablet at bedtime. May take 5 mg daily PRN for anxiety. 180 tablet 0     ondansetron (ZOFRAN) 4 MG tablet Take 1 tablet (4 mg) by mouth every 8 hours as needed for nausea 30 tablet 0     pantoprazole (PROTONIX) 40 MG EC tablet Take 1 tablet (40 mg) by mouth every morning (before breakfast) 90 tablet 3     senna (SENOKOT) 8.6 MG tablet Take 1 tablet by mouth 2 times daily as needed for constipation 60 tablet 3     sodium chloride (OCEAN) 0.65 % nasal spray Use in both nostrils 3 times a day. 30 mL 1       Reviewed and updated as needed this visit by Provider         Review of Systems   CONSTITUTIONAL: NEGATIVE for fever, chills, change in weight  EYES: NEGATIVE for vision changes or irritation  ENT/MOUTH: NEGATIVE for ear, mouth and throat problems  RESP: NEGATIVE for significant cough or SOB  CV: NEGATIVE for chest pain,  palpitations or peripheral edema  MUSCULOSKELETAL: NEGATIVE for significant arthralgias or myalgia  NEURO: h/o migraines  ENDOCRINE: NEGATIVE for temperature intolerance, skin/hair changes  PSYCHIATRIC: h/o bipolar disorder/depression(sees psychiatrist)      Objective         Physical Exam     GENERAL: Healthy, alert and no distress  EYES: Eyes grossly normal to inspection.  No discharge or erythema, or obvious scleral/conjunctival abnormalities.  HENT: Normal cephalic/atraumatic.  External ears, nose and mouth without ulcers or lesions.  No nasal drainage visible.  RESP: No audible wheeze, cough, or visible cyanosis.  No visible retractions or increased work of breathing.    NEURO:   Mentation and speech appropriate for age.  PSYCH: Mentation appears normal, affect normal/bright, judgement and insight intact, normal speech and appearance well-groomed.             Assessment & Plan     (E03.8) Other specified hypothyroidism    Plan: Last TSH abnormal, currently on Levoxyl 50 mcg daily, will check TSH with free T4 reflex and adjust Levoxyl dose after results.            (G43.809) Other migraine without status migrainosus, not intractable  Plan: On Relpax and ketorolac injections at home, discussed with the patient that I am not sure if Dr. Méndez will be prescribing ketorolac injection for patient to take it at home but she can discuss this with him when he gets back.  Patient understands      (I10) Essential hypertension  Plan: on amlodipine, carvedilol         Rod Melton MD  [facility]      Video-Visit Details    Type of service:  Video Visit    Video End Time:2:52 pm     Originating Location (pt. Location): Home    Distant Location (provider location):  [facility]     Platform used for Video Visit: Thuy Melton MD

## 2020-08-04 DIAGNOSIS — E03.8 OTHER SPECIFIED HYPOTHYROIDISM: ICD-10-CM

## 2020-08-04 PROCEDURE — 84443 ASSAY THYROID STIM HORMONE: CPT | Performed by: INTERNAL MEDICINE

## 2020-08-04 PROCEDURE — 36415 COLL VENOUS BLD VENIPUNCTURE: CPT | Performed by: INTERNAL MEDICINE

## 2020-08-05 LAB — TSH SERPL DL<=0.005 MIU/L-ACNC: 2.75 MU/L (ref 0.4–4)

## 2020-08-20 ENCOUNTER — VIRTUAL VISIT (OUTPATIENT)
Dept: PSYCHIATRY | Facility: CLINIC | Age: 50
End: 2020-08-20
Attending: PSYCHIATRY & NEUROLOGY
Payer: COMMERCIAL

## 2020-08-20 DIAGNOSIS — F41.0 PANIC DISORDER: ICD-10-CM

## 2020-08-20 DIAGNOSIS — F31.32 BIPOLAR 1 DISORDER, DEPRESSED, MODERATE (H): Chronic | ICD-10-CM

## 2020-08-20 RX ORDER — CLONAZEPAM 1 MG/1
TABLET ORAL
Qty: 120 TABLET | Refills: 2 | Status: SHIPPED | OUTPATIENT
Start: 2020-08-20 | End: 2020-11-02

## 2020-08-20 RX ORDER — OLANZAPINE 10 MG/1
10 TABLET ORAL AT BEDTIME
Qty: 90 TABLET | Refills: 1 | Status: SHIPPED | OUTPATIENT
Start: 2020-08-20 | End: 2020-11-28

## 2020-08-20 RX ORDER — MIRTAZAPINE 30 MG/1
60 TABLET, FILM COATED ORAL AT BEDTIME
Qty: 180 TABLET | Refills: 1 | Status: SHIPPED | OUTPATIENT
Start: 2020-08-20 | End: 2021-01-22

## 2020-08-20 RX ORDER — OLANZAPINE 5 MG/1
TABLET ORAL
Qty: 180 TABLET | Refills: 1 | Status: SHIPPED | OUTPATIENT
Start: 2020-08-20 | End: 2020-11-28

## 2020-09-03 ENCOUNTER — TRANSFERRED RECORDS (OUTPATIENT)
Dept: HEALTH INFORMATION MANAGEMENT | Facility: CLINIC | Age: 50
End: 2020-09-03

## 2020-09-03 ENCOUNTER — TELEPHONE (OUTPATIENT)
Dept: PSYCHIATRY | Facility: CLINIC | Age: 50
End: 2020-09-03

## 2020-09-03 NOTE — TELEPHONE ENCOUNTER
Per [place]  clonazepam last filled 8/26/20 at [facility] pharmacy.  Routed to provider.        Jaden Rodriguez MD Snyder, David J, RN    Caller: Unspecified (Today,  2:24 PM)               OK to fill    Previous Messages     ----- Message -----   From: Sly Herbert RN   Sent: 9/3/2020   2:54 PM CDT   To: Jaden Rodriguez MD, Sly Herbert RN     ----- Message from Sly Herbert RN sent at 9/3/2020  2:54 PM CDT -----   Dr. Rodriguez:   OK to authorize pharmacy to dispense clonazepam again?   Pt is reporting they never received it.   Donald Gray left a voice mail message for Luzma, identifying that replacement of the medication shipment was approved.

## 2020-09-03 NOTE — TELEPHONE ENCOUNTER
[facility] Call Center    Phone Message    May a detailed message be left on voicemail: yes     Reason for Call: Medication Question or concern regarding medication   Prescription Clarification  Name of Medication: clonazepam  Prescribing Provider: Michael   Pharmacy:      [facility] - [place] [address]   What on the order needs clarification?   Pt never received clonazepam refill, (got other three rxs shipped at same time but separately), requested replacement from the [facility] mail pharmacy. Pharmacy told pt they'd be reaching ouit for Dr Rodriguez's approval to replace the rx          Action Taken: Message routed to:  Other: nursing pool    Travel Screening: Not Applicable

## 2020-09-03 NOTE — TELEPHONE ENCOUNTER
Writer called pt and identified that the replacement had been approved.  Pt expressed appreciation for Dr. Rodriguez doing this.

## 2020-09-03 NOTE — TELEPHONE ENCOUNTER
[facility] Call Center    Phone Message    May a detailed message be left on voicemail: yes     Reason for Call: Medication Question or concern regarding medication   Prescription Clarification  Name of Medication: clonazepam 1mg  Prescribing Provider: Jaden Rodriguez MD   Pharmacy:  [facility] - [place], [place] - [address]   What on the order needs clarification? Incoming call from Luzma at [facility]. Post office delivered this prescription on 8/26/20 to the /reception  at the patient's home but the patient said it was never received. Pharmacy is wondering if they can have verbal permission to replace that medication since it is controlled.    Luzma can be reached at [phone] ext [phone]. If she is not available, Luzma asked that a detailed voicemail is left with the caller's first name, last name, and title.        Action Taken: Message routed to:  Other: Nursing pool    Travel Screening: Not Applicable

## 2020-09-04 DIAGNOSIS — F31.9 BIPOLAR I DISORDER (H): ICD-10-CM

## 2020-09-04 DIAGNOSIS — F41.9 ANXIETY DISORDER: ICD-10-CM

## 2020-09-04 RX ORDER — CYCLOBENZAPRINE HCL 10 MG
10 TABLET ORAL AT BEDTIME
Qty: 90 TABLET | Refills: 0 | Status: SHIPPED | OUTPATIENT
Start: 2020-09-04 | End: 2020-12-08

## 2020-09-04 RX ORDER — HYDROXYZINE PAMOATE 50 MG/1
CAPSULE ORAL
Qty: 270 CAPSULE | Refills: 0 | Status: SHIPPED | OUTPATIENT
Start: 2020-09-04 | End: 2020-11-13

## 2020-09-04 RX ORDER — CYCLOBENZAPRINE HCL 10 MG
10 TABLET ORAL AT BEDTIME
Qty: 90 TABLET | Refills: 0 | Status: CANCELLED | OUTPATIENT
Start: 2020-09-04

## 2020-09-04 NOTE — TELEPHONE ENCOUNTER
Controlled Substance Refill Request for flexeril  Problem List Complete:    No     PROVIDER TO CONSIDER COMPLETION OF PROBLEM LIST AND OVERVIEW/CONTROLLED SUBSTANCE AGREEMENT    Last Written Prescription Date:  7/14/20  Last Fill Quantity: 90,   # refills: 0    THE MOST RECENT OFFICE VISIT MUST BE WITHIN THE PAST 3 MONTHS. AT LEAST ONE FACE TO FACE VISIT MUST OCCUR EVERY 6 MONTHS. ADDITIONAL VISITS CAN BE VIRTUAL.  (THIS STATEMENT SHOULD BE DELETED.)    Last Office Visit with [facility] care provider: merna Melton 7/29/20    Future Office visit:   Next 5 appointments (look out 90 days)    Oct 23, 2020 10:30 AM CDT  Office Visit with Faina Zamorano MD  [facility] [address]  [address]  [facility] [id number] [phone]          Controlled substance agreement:   Encounter-Level CSA:    There are no encounter-level csa.     Patient-Level CSA:    There are no patient-level csa.         Last Urine Drug Screen: No results found for: Yanni CROSS Drug Analysis UR   Date Value Ref Range Status   12/14/2017 Canceled, Test credited  Final     Comment:     Quantity not sufficient  MESSAGED DR PERRY [id number] AT 1440 BY ALEJANDRO     , No results found for: THC13, PCP13, COC13, MAMP13, OPI13, AMP13, BZO13, TCA13, MTD13, BAR13, OXY13, PPX13, BUP13     Processing:  e scribe     [email or web address]       checked in past 3 months?  No, route to RN

## 2020-09-04 NOTE — TELEPHONE ENCOUNTER
From: ShanonColeencookiejenniferLeticia hwang   Sent: 9/4/2020  12:57 PM CDT   To: Hattie Hare RN   Subject: federica's pt                                     PT called and said her pharmacy told her that her medication prescription needed to be renewed. Pt would like to receive a confirmation call about her medication renewal.     Caller:  SELF   Relationship to pt: SELF   Medication:   hydrOXYzine (VISTARIL) 50 MG capsule   How many days left of med do you have left (if >3 day supply, redirect to pharmacy): less than what's she okay with -- wouldn't give an exact amount   Pharmacy and location: [facility] - [place] [address] [address]   Pending appt date:  10/22/20 @ 3:30PM   Okay to leave detailed VM:  yes [phone]       Last seen: 8/20  RTC: Not specified  Non-provider cancel: None  No-show: None  Next appt: 10/22    Incoming refill from [facility] - [place] [address] [address] via electronic request     Medication requested:   Disp  Refills  Start  End  ADELAIDA    hydrOXYzine (VISTARIL) 50 MG capsule  270 capsule  0  7/16/2020   No    Sig: Take 1 cap BID and 1 cap PRN for anxiety      Writer called pharmacy ([phone] ) and confirmed that the 7/16 order was filled on 7/21 and so no refills remain.  Responder identified that it was due to be refilled on 8/30 because they refill every 8 weeks due to processing delays and dealing with [employer]. Writer identified that 8/30 is 5 weeks after 7/21. Responder reported that pt should have one month of medication remaining.    Refill routed to provider due to refill protocol (last visit note not signed).        09/04/20 1516  Sign  Jaden Rodriguez MD    E-Prescribing Status: Receipt confirmed by pharmacy (9/4/2020  3:16 PM CDT)

## 2020-09-04 NOTE — TELEPHONE ENCOUNTER
Cyclobenzaprine refill request    Last OV on 7/29/20    Last refill on 7/14/20 for #90     Routing refill request to provider for review/approval because:  Drug not on the [facility] refill protocol

## 2020-09-08 ENCOUNTER — VIRTUAL VISIT (OUTPATIENT)
Dept: INTERNAL MEDICINE | Facility: CLINIC | Age: 50
End: 2020-09-08
Payer: COMMERCIAL

## 2020-09-08 ENCOUNTER — TELEPHONE (OUTPATIENT)
Dept: INTERNAL MEDICINE | Facility: CLINIC | Age: 50
End: 2020-09-08

## 2020-09-08 DIAGNOSIS — G43.919 INTRACTABLE MIGRAINE WITHOUT STATUS MIGRAINOSUS, UNSPECIFIED MIGRAINE TYPE: ICD-10-CM

## 2020-09-08 DIAGNOSIS — G43.919 INTRACTABLE MIGRAINE WITHOUT STATUS MIGRAINOSUS, UNSPECIFIED MIGRAINE TYPE: Primary | ICD-10-CM

## 2020-09-08 PROCEDURE — 99213 OFFICE O/P EST LOW 20 MIN: CPT | Mod: 95 | Performed by: INTERNAL MEDICINE

## 2020-09-08 RX ORDER — ACETAMINOPHEN 500 MG
500-1000 TABLET ORAL EVERY 6 HOURS PRN
Status: ON HOLD | COMMUNITY
Start: 2020-09-08 | End: 2022-01-01

## 2020-09-08 RX ORDER — KETOROLAC TROMETHAMINE 30 MG/ML
30 INJECTION, SOLUTION INTRAMUSCULAR; INTRAVENOUS EVERY 6 HOURS PRN
Qty: 10 ML | Refills: 0 | Status: SHIPPED | OUTPATIENT
Start: 2020-09-08 | End: 2020-09-08

## 2020-09-08 RX ORDER — ELETRIPTAN HYDROBROMIDE 40 MG/1
80 TABLET, FILM COATED ORAL
COMMUNITY
Start: 2020-09-08 | End: 2021-01-18 | Stop reason: ALTCHOICE

## 2020-09-08 RX ORDER — KETOROLAC TROMETHAMINE 30 MG/ML
30 INJECTION, SOLUTION INTRAMUSCULAR; INTRAVENOUS EVERY 6 HOURS PRN
Qty: 5 ML | Refills: 0 | Status: ON HOLD | OUTPATIENT
Start: 2020-09-08 | End: 2020-11-29

## 2020-09-08 RX ORDER — KETOROLAC TROMETHAMINE 30 MG/ML
30 INJECTION, SOLUTION INTRAMUSCULAR; INTRAVENOUS EVERY 6 HOURS PRN
COMMUNITY
Start: 2020-09-08 | End: 2020-09-08

## 2020-09-08 NOTE — PROGRESS NOTES
"Ana Laura Wharton is a 49 year old female who is being evaluated via a billable video visit.      The patient has been notified of following:     \"This video visit will be conducted via a call between you and your physician/provider. We have found that certain health care needs can be provided without the need for an in-person physical exam.  This service lets us provide the care you need with a video conversation.  If a prescription is necessary we can send it directly to your pharmacy.  If lab work is needed we can place an order for that and you can then stop by our lab to have the test done at a later time.    Video visits are billed at different rates depending on your insurance coverage.  Please reach out to your insurance provider with any questions.    If during the course of the call the physician/provider feels a video visit is not appropriate, you will not be charged for this service.\"    Patient has given verbal consent for Video visit? Yes  How would you like to obtain your AVS? MyChart  If you are dropped from the video visit, the video invite should be resent to: Text to cell phone: [phone]  Will anyone else be joining your video visit? No    Subjective     Ana Laura Wharton is a 49 year old female who presents today via video visit for the following health issues:    HPI    Follow up migraines.     Patient has h/o Migraine HA. Seen [facility] in the past. Has been on Relpax, Midrin, Toradol injections PRN.   On implanted hormonal estrogen treatment for prevention.   Has had good results. Now has increased migraine frequency, 3-4 times a week. Has Relpax, but is not sufficient to control HA.   Has not seen neurology for 2 years. No neurologic deficits. No aura.     Has h/o depression. On medical treatment, controlled, no side effects. No depressive symptoms or suicidal ideation. Seeing psychiatry.       Video Start Time: 10:20 AM        Review of Systems   Constitutional, HEENT, cardiovascular, " "pulmonary, gi and gu systems are negative, except as otherwise noted.      Objective           Vitals:  No vitals were obtained today due to virtual visit.    Physical Exam     GENERAL: Healthy, alert and no distress  EYES: Eyes grossly normal to inspection.  No discharge or erythema, or obvious scleral/conjunctival abnormalities.  RESP: No audible wheeze, cough, or visible cyanosis.  No visible retractions or increased work of breathing.    SKIN: Visible skin clear. No significant rash, abnormal pigmentation or lesions.  NEURO: Cranial nerves grossly intact.  Mentation and speech appropriate for age.  PSYCH: Mentation appears normal, affect normal/bright, judgement and insight intact, normal speech and appearance well-groomed.              Assessment & Plan   Problem List Items Addressed This Visit     None      Visit Diagnoses     Intractable migraine without status migrainosus, unspecified migraine type    -  Primary    Relevant Medications    acetaminophen (TYLENOL) 500 MG tablet    eletriptan (RELPAX) 40 MG tablet    ketorolac (TORADOL) 30 MG/ML injection    isometheptene-dichloralphenazone-acetaminophen (MIDRIN) -325 MG capsule    Syringe/Needle, Disp, (SYRINGE LUER LOCK) 25G X 1\" 3 ML MISC    Other Relevant Orders    NEUROLOGY ADULT REFERRAL           Refer to neurology  Refilled Midrin and Ketorolac  Continue Relpax  Reassess if symptoms are not improving         See Patient Instructions  Return in about 3 months (around 12/8/2020) for Routine Visit.    Liborio Lincoln MD  [facility]      Video-Visit Details    Type of service:  Video Visit    Video End Time:10:37 AM    Originating Location (pt. Location): Home    Distant Location (provider location):  [facility]     Platform used for Video Visit: Thuy          "

## 2020-09-08 NOTE — TELEPHONE ENCOUNTER
Pharmacist from [facility] calling for carnification on directions for Ketorolac. This medication is not usually injected at home. Also Midrin is not available, please prescribe alternative medication. Call [facility] at [phone]

## 2020-09-08 NOTE — TELEPHONE ENCOUNTER
Call to pharmacy. States Toradol intramuscular also is not covered by insurance and would require a prior authorization. Patient asking about out of pocket cost. Call back to pharmacy. States Toradol cost out of pocket would be $59.59. Please send new prescription for Toradol IM so patient would have the option to pay out of pocket if she chooses. Please also advise if prior authorization should be attempted.    Per pharmacist Midrin has been discontinued and is no longer available.

## 2020-09-08 NOTE — TELEPHONE ENCOUNTER
Fax received from pharmacy states Ketorolac is not covered by patients plan and that a [employer] [facility] [employer] product is required.  Please send new RX with strength, directions, qty., and refills.

## 2020-09-08 NOTE — TELEPHONE ENCOUNTER
Please clarify - was Toradol intended to be prescribed to be used intravenously at home or intramuscular?

## 2020-09-09 NOTE — TELEPHONE ENCOUNTER
See message below. Please clarify the Ketorolac dose.     5 ml dispensed.   Directions state that she can have 1 mL every 6 hrs prn. This would last less than 2 days.

## 2020-09-09 NOTE — TELEPHONE ENCOUNTER
She should not take more than one injection a day.   I decided to dispense 5 ml as it can affect her kidneys.

## 2020-09-09 NOTE — TELEPHONE ENCOUNTER
Patient is calling because she was under the impression she was going to get ten vials of toradol but the pharmacy only gave her five. She did get ten syringes so she is confused.

## 2020-10-06 ENCOUNTER — VIRTUAL VISIT (OUTPATIENT)
Dept: INTERNAL MEDICINE | Facility: CLINIC | Age: 50
End: 2020-10-06
Payer: COMMERCIAL

## 2020-10-06 ENCOUNTER — TELEPHONE (OUTPATIENT)
Dept: INTERNAL MEDICINE | Facility: CLINIC | Age: 50
End: 2020-10-06

## 2020-10-06 ENCOUNTER — TRANSFERRED RECORDS (OUTPATIENT)
Dept: HEALTH INFORMATION MANAGEMENT | Facility: CLINIC | Age: 50
End: 2020-10-06

## 2020-10-06 DIAGNOSIS — S99.922A FOOT INJURY, LEFT, INITIAL ENCOUNTER: Primary | ICD-10-CM

## 2020-10-06 PROCEDURE — 99213 OFFICE O/P EST LOW 20 MIN: CPT | Mod: 95 | Performed by: INTERNAL MEDICINE

## 2020-10-06 NOTE — PROGRESS NOTES
"Ana Laura Wharton is a 49 year old female who is being evaluated via a billable telephone visit.      The patient has been notified of following:     \"This telephone visit will be conducted via a call between you and your physician/provider. We have found that certain health care needs can be provided without the need for a physical exam.  This service lets us provide the care you need with a short phone conversation.  If a prescription is necessary we can send it directly to your pharmacy.  If lab work is needed we can place an order for that and you can then stop by our lab to have the test done at a later time.    Telephone visits are billed at different rates depending on your insurance coverage. During this emergency period, for some insurers they may be billed the same as an in-person visit.  Please reach out to your insurance provider with any questions.    If during the course of the call the physician/provider feels a telephone visit is not appropriate, you will not be charged for this service.\"    Patient has given verbal consent for Telephone visit?  Yes    What phone number would you like to be contacted at? [phone]    How would you like to obtain your AVS? MyChart    Subjective     Ana Laura Wharton is a 49 year old female who presents via phone visit today for the following health issues:    HPI   Chief Complaint   Patient presents with     Pain     Lt foot pain/requesting medication          Presents with left foot pain for 3 days.   Has twisted it, without fall or direct trauma on it.   The foot is painful with walking and at rest. Has developed back and blue discoloration on the dorsal foot.   Seen today in orthopedic clinic and X rays showed possible non displaced metatarsal fracture.   Patient was given cam boot, crutches and is scheduled for a CT tomorrow.   Has persistent pain. Has been taking Tylenol, Ibuprofen and applying ice to the foot.            Review of Systems   Constitutional, HEENT, " cardiovascular, pulmonary, gi and gu systems are negative, except as otherwise noted.       Objective          Vitals:  No vitals were obtained today due to virtual visit.    healthy, alert and no distress  PSYCH: Alert and oriented times 3; coherent speech, normal   rate and volume, able to articulate logical thoughts, able   to abstract reason, no tangential thoughts, no hallucinations   or delusions  Her affect is anxious  RESP: No cough, no audible wheezing, able to talk in full sentences  Remainder of exam unable to be completed due to telephone visits            Assessment/Plan:    Assessment & Plan   Problem List Items Addressed This Visit     None      Visit Diagnoses     Foot injury, left, initial encounter    -  Primary           Advised to alternate Ibuprofen 400 mg with Tylenol 1000 mg every 3 hours  Use topical Lidocaine gel  Keep ice as needed   No weight bearing   Await CT results   Follow up with ortho        See Patient Instructions  Return in about 1 week (around 10/13/2020) for follow up on acute problem if persists.    Liborio Lincoln MD  [facility]    Phone call duration:  8 minutes

## 2020-10-06 NOTE — TELEPHONE ENCOUNTER
She should take Tylenol 1000 mg three times daily and if not controlled pain, alternate with Ibuprofen 400 mg .  Also can apply topically Lidocaine patches OTC.

## 2020-10-06 NOTE — TELEPHONE ENCOUNTER
"Patient woke up with a swollen left foot 3 days ago and just was seen this morning at [facility] in [place] and diagnosed with broken bones \"all over the top of her foot\". Provider at [facility] asked her to see her PCP for pain medication and she is in a lot of pain. Call her to advise at [phone] (home)     "

## 2020-10-06 NOTE — TELEPHONE ENCOUNTER
"Received report from [facility].   Xray shows:  \"possible 3rd metatarsal base fracture nondisplaced.\"     They ordered CT scan to rule out a fracture at the base of the third metatarsal or any other acute fractures.     She was placed in short cam boot and crutches to be non-weight bearing.     See her message below. Asking for pain medication.   "

## 2020-10-07 ENCOUNTER — TELEPHONE (OUTPATIENT)
Dept: INTERNAL MEDICINE | Facility: CLINIC | Age: 50
End: 2020-10-07

## 2020-10-07 NOTE — TELEPHONE ENCOUNTER
Did we get the result of her CAT scan?  I would recommend the same measures , as she has not tried them yet.   Will reassess if symptoms are not better on the treatment.

## 2020-10-07 NOTE — TELEPHONE ENCOUNTER
Patient calling again about pain management.  Relayed Dr. Lincoln's message again and patient responded that Dr. Lincoln must be mad at her.  I advised her that Dr. Lincoln is not mad at her.  Advised patient that based on the information he has for her current injury this is what he feels is best.  Discussed opioids not always been the best options with patient.  Advised patient I would send another message to Dr. Lincoln and suggested trying the Lidocaine topical OTC that Dr. Lincoln had suggested along with the Tylenol and ibuprofen.

## 2020-10-07 NOTE — TELEPHONE ENCOUNTER
Patient calling  She has a left foot fracture and the tylenol and advil is not working for her pain. Please advise. Ok to call and dontrell [phone]

## 2020-10-07 NOTE — TELEPHONE ENCOUNTER
Call to [facility] for copy of scan report. They states she CANCELLED her appointment for CT scan today. She did not get any scanning at their facility.      She had told our  that she had the scan this AM.   IF pt calls back, need to find out WHERE she had scan done, so we can get a copy of report.

## 2020-10-22 ENCOUNTER — VIRTUAL VISIT (OUTPATIENT)
Dept: PSYCHIATRY | Facility: CLINIC | Age: 50
End: 2020-10-22
Attending: PSYCHIATRY & NEUROLOGY
Payer: COMMERCIAL

## 2020-10-23 ENCOUNTER — TELEPHONE (OUTPATIENT)
Dept: PSYCHIATRY | Facility: CLINIC | Age: 50
End: 2020-10-23

## 2020-10-23 DIAGNOSIS — F31.9 BIPOLAR I DISORDER (H): Primary | ICD-10-CM

## 2020-10-23 NOTE — TELEPHONE ENCOUNTER
"Writer received call from pt, who identified feeling that she needs \"something more\" for her anxiety.  Pt stated that she is feeling \"very suicidal\" and \"yes, I can keep myself safe\" and that she feels that she needs a med adjustment.    Pt reported that she recently went through divorce and has been working on getting her belongings back, stating \"I feel like my whole life has been hijacked\" and that \"I can't function.\"    Pt reports that she hasn't been taking PRN hydroxyzine and that she doesn't feel that it helps with anxiety.  Has been taking PRN olanzapine for anxiety.  Pt reports that her sleep has been restless, vomiting in night and in morning due to anxiety.    Pt stated \"I think I need some Valium\" and that she has taken this medication some time ago.    Pt identified that she spoke with Dr. Rodriguez by phone yesterday, including talking about anxiety and SI. She was directed to call back on Monday, but she feels that she can't wait that long.    Routed to provider.          =========================================      Jaden Rodriguez MD Snyder, David J, RN   Caller: Unspecified (Today, 11:31 AM)             Try phenergan 25-50 mg q6 hr prn anxiety. Give 25 mg #60 with no efills.    Previous Messages    ----- Message -----   From: Sly Herbert RN   Sent: 10/23/2020  11:44 AM CDT   To: Jaden Rodriguez MD     ----- Message from Sly Herbert RN sent at 10/23/2020 11:44 AM CDT -----   Jaden:   Please see my note regarding pt's increased anxiety and advise.   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    Writer conferred with Dr. Rodriguez, who identified that adding olanzapine, up to 10 mg per day, would be a preferred choice. He won't provide benzodiazepine due to her drinking.    Writer called pt and coached her on increased olanzapine dose. Pt identified understanding.  Writer prompted pt to contact 911 or ED if she feels " that she is unable to keep herself safe and to contact the clinic after hours number if she has other questions.

## 2020-10-25 ENCOUNTER — HOSPITAL ENCOUNTER (EMERGENCY)
Facility: CLINIC | Age: 50
Discharge: HOME | End: 2020-10-25
Attending: INTERNAL MEDICINE | Admitting: INTERNAL MEDICINE
Payer: COMMERCIAL

## 2020-10-25 VITALS
SYSTOLIC BLOOD PRESSURE: 110 MMHG | BODY MASS INDEX: 20.89 KG/M2 | HEART RATE: 93 BPM | OXYGEN SATURATION: 99 % | HEIGHT: 66 IN | WEIGHT: 130 LBS | RESPIRATION RATE: 16 BRPM | DIASTOLIC BLOOD PRESSURE: 72 MMHG | TEMPERATURE: 97.8 F

## 2020-10-25 DIAGNOSIS — R31.9 URINARY TRACT INFECTION WITH HEMATURIA, SITE UNSPECIFIED: ICD-10-CM

## 2020-10-25 DIAGNOSIS — N39.0 URINARY TRACT INFECTION WITH HEMATURIA, SITE UNSPECIFIED: ICD-10-CM

## 2020-10-25 LAB
ALBUMIN UR-MCNC: 70 MG/DL
APPEARANCE UR: ABNORMAL
BACTERIA #/AREA URNS HPF: ABNORMAL /HPF
BILIRUB UR QL STRIP: NEGATIVE
COLOR UR AUTO: YELLOW
GLUCOSE UR STRIP-MCNC: NEGATIVE MG/DL
HGB UR QL STRIP: ABNORMAL
KETONES UR STRIP-MCNC: NEGATIVE MG/DL
LEUKOCYTE ESTERASE UR QL STRIP: ABNORMAL
MUCOUS THREADS #/AREA URNS LPF: PRESENT /LPF
NITRATE UR QL: NEGATIVE
PH UR STRIP: 6 PH (ref 5–7)
RBC #/AREA URNS AUTO: 57 /HPF (ref 0–2)
SOURCE: ABNORMAL
SP GR UR STRIP: 1.01 (ref 1–1.03)
TRANS CELLS #/AREA URNS HPF: 3 /HPF (ref 0–1)
UROBILINOGEN UR STRIP-MCNC: NORMAL MG/DL (ref 0–2)
WBC #/AREA URNS AUTO: >182 /HPF (ref 0–5)
WBC CLUMPS #/AREA URNS HPF: PRESENT /HPF

## 2020-10-25 PROCEDURE — 87088 URINE BACTERIA CULTURE: CPT | Performed by: INTERNAL MEDICINE

## 2020-10-25 PROCEDURE — 99283 EMERGENCY DEPT VISIT LOW MDM: CPT

## 2020-10-25 PROCEDURE — 87086 URINE CULTURE/COLONY COUNT: CPT | Performed by: INTERNAL MEDICINE

## 2020-10-25 PROCEDURE — 81001 URINALYSIS AUTO W/SCOPE: CPT | Performed by: INTERNAL MEDICINE

## 2020-10-25 PROCEDURE — 87186 SC STD MICRODIL/AGAR DIL: CPT | Performed by: INTERNAL MEDICINE

## 2020-10-25 RX ORDER — NITROFURANTOIN 25; 75 MG/1; MG/1
100 CAPSULE ORAL 2 TIMES DAILY
Qty: 14 CAPSULE | Refills: 0 | Status: SHIPPED | OUTPATIENT
Start: 2020-10-25 | End: 2020-11-28

## 2020-10-25 ASSESSMENT — ENCOUNTER SYMPTOMS
CHILLS: 0
SHORTNESS OF BREATH: 0
VOMITING: 0
NAUSEA: 0
ABDOMINAL PAIN: 0
FLANK PAIN: 0
COUGH: 0
DIARRHEA: 0
SORE THROAT: 0
HEMATURIA: 0
DYSURIA: 1
FEVER: 0

## 2020-10-25 ASSESSMENT — MIFFLIN-ST. JEOR: SCORE: 1226.43

## 2020-10-25 NOTE — ED PROVIDER NOTES
"   History   Chief Complaint  UTI    HPI   Ana Laura Wharton is a 50 year old female with a history of frequent UTIs who presents for evaluation of dysuria for about 10 days. Ana Laura states she gets UTI's fairly frequently with her last UTI a couple months ago. She notes that her symptoms onset with an odorous urine that has progressively darkened since then. She notes urethral pain. She denies flank pain, fever, nausea, vomiting, diarrhea, or changes to stools. She denies cough or cold symptoms.    Allergies  Amoxicillin-Pot Clavulanate  Azithromycin  Cephalexin  Compazine [Prochlorperazine]  Metoclopramide  Minocycline  Penicillins  Reglan [Metoclopramide Hcl]  Restoril [Temazepam]  Thorazine [Chlorpromazine]  Abilify [Aripiprazole]  Lamotrigine  Sulfa Drugs    Medications  Albuterol  Norvasc  Lipitor  Coreg  Klonopin  Relpax  Vistaril  Midrin  Levothyroxine  Remeron    Past Medical History  Anxiety  Bipolar disorder  Depression  Hypertension  GERD  Migraine  Hypothyroidism    Past Surgical History  Left knee arthroscopy  Cervix surgery  Left knee surgery  Left shoulder surgery  thoracic surgery for pneumothorax.    Family History  Depression  Pacemaker  Osteoporosis  Lipids  Macular degernation    Social History  Tobacco use: current every day smoker  Alcohol use: yes   Drug use: yes, marijuana  Marital Status:     Review of Systems   Constitutional: Negative for chills and fever.   HENT: Negative for congestion and sore throat.    Respiratory: Negative for cough and shortness of breath.    Gastrointestinal: Negative for abdominal pain, diarrhea, nausea and vomiting.   Genitourinary: Positive for dysuria. Negative for flank pain and hematuria.   All other systems reviewed and are negative.    Physical Exam     Patient Vitals for the past 24 hrs:   BP Temp Temp src Pulse Resp SpO2 Height Weight   10/25/20 1353 110/72 97.8  F (36.6  C) Temporal 93 16 99 % 1.676 m (5' 6\") 59 kg (130 lb)       Physical " Exam  Constitutional:       Comments: Pleasant and cooperative   Abdominal:      Palpations: Abdomen is soft.      Tenderness: There is no abdominal tenderness.   Genitourinary:     Comments: No CVA tenderness  Neurological:      Mental Status: She is alert.             Emergency Department Course   Laboratory:  Laboratory findings were communicated with the patient who voiced understanding of the findings.    UA with Microscopic: Blood: Moderate (A), Protein Albumin: 70 (A), Leukocyte Esterase: Large (A), WBC: >182 (A), RBC: 57 (high), WBC: Present (A), Bacteria: Few (A), Transitional Epi: 3 (high)  Mucous: Present (A), o/w WNL    Urine Culture: pending    Emergency Department Course:  Past medical records, nursing notes, and vitals reviewed.    1415 I physically examined the patient as documented above.    The patient provided a urine sample here in the emergency department. This was sent for laboratory testing, findings above.    1510 I rechecked the patient and discussed the findings of their workup thus far.     Findings and plan explained to the Patient. Patient discharged home with instructions regarding supportive care, medications, and reasons to return. The importance of close follow-up was reviewed. The patient was prescribed Macrobid.    I personally reviewed the laboratory results with the Patient and answered all related questions prior to discharge.     Impression & Plan   Medical Decision Making:    Ana Laura Wharton is a 50 year old female who presents to the emergency department with symptoms suggestive of UTI.  Urine is markedly abnormal with positive leukocyte Estrace, white cells, and bacteria.  Urine culture is ordered and pending.  Patient has multiple medication allergies but is able to tolerate nitrofurantoin.  I will discharge the patient with a 7-day course, she has Azo at home to use as needed, return if problems, follow-up with primary care in about 3 days.      Diagnosis:    ICD-10-CM     1. Urinary tract infection with hematuria, site unspecified  N39.0     R31.9        Disposition:  Discharged to home.    Discharge Medications:  New Prescriptions    NITROFURANTOIN MACROCRYSTAL-MONOHYDRATE (MACROBID) 100 MG CAPSULE    Take 1 capsule (100 mg) by mouth 2 times daily       Scribe Disclosure:  NANCY, Giovanni Kraft, am serving as a scribe at 2:10 PM on 10/25/2020 to document services personally performed by Anna Lobo MD based on my observations and the provider's statements to me.      Anna Lobo MD  10/25/20 1525

## 2020-10-25 NOTE — DISCHARGE INSTRUCTIONS
Discharge Instructions  Urinary Tract Infection  You have urinary tract infection, or UTI. The urinary tract includes the kidneys (which make urine), ureters (the tubes that carry urine from the kidneys to the bladder), the bladder (which stores urine), and urethra (the tube that carries urine out of the bladder).  Urinary tract infections occur when bacteria travel up the urethra into the bladder. We suspect a UTI based on chemical and microscopic findings in your urine, but if there is a question about your findings, we will do a culture to see if bacteria grow. A urine culture takes several days. You should always follow-up with your primary physician to find out about results of your culture if one was done.   Return to the Emergency Department if:    You have severe back pain.    You are vomiting so that you can t take your medicine, or have signs of dehydration (such as urinating less than 3 times per day).    You have fever over 101.5 degrees F.    You have significant confusion or are very weak, or feel very ill.    You seems much more ill.    Your showing signs of dehydration, Signs of dehydration can be:  o You not passed urine in 6-8 hours.    Follow-up with your doctor:     You should begin to feel better within 24 - 48 hours of starting your antibiotic.  If you do not, you need to be seen again.      Treatment:     You will be treated with an antibiotic to kill the bacteria. We have to make an educated guess as to which antibiotic will work for your infection. In most healthy people, we can guess right almost all of the time. Sometimes a culture is done to show which antibiotics will work. This usually takes 2-3 days. When the culture is done, we may have to contact you to put you on a different antibiotic.    Take a pain medication such as Tylenol  (acetaminophen), Advil  (ibuprofen), Nuprin  (ibuprofen), or Aleve  (naproxen).    Pyridium  (phenazopyridine) or Uristat  (phenazopyridine) is a  "prescription medication that numbs the bladder to reduce the burning pain of some UTIs.  The same medication is available in a non-prescription version called Azo-Standard  (phenazopyridine), Urodol  (phenazopyridine), or other brand names. This medication will change the color of the urine and tears (usually blue or orange). If you wear contacts, do not wear them while taking this medication as they may be stained by the medication.    Antibiotic Warning:     If you have been placed on antibiotics - watch for signs of allergic reaction.  These include rash, lip swelling, difficulty breathing, wheezing, and dizziness.  If you develop any of these symptoms, stop the antibiotic immediately and go to an emergency room or urgent care for evaluation.    Probiotics: If you have been given an antibiotic, you may want to also take a probiotic pill or eat yogurt with live cultures. Probiotics have \"good bacteria\" to help your intestines stay healthy. Studies have shown that probiotics help prevent diarrhea and other intestine problems (including C. diff infection) when you take antibiotics. You can buy these without a prescription in the pharmacy section of the store.   If you were given a prescription for medicine here today, be sure to read all of the information (including the package insert) that comes with your prescription.  This will include important information about the medicine, its side effects, and any warnings that you need to know about.  The pharmacist who fills the prescription can provide more information and answer questions you may have about the medicine.  If you have questions or concerns that the pharmacist cannot address, please call or return to the Emergency Department.   Remember that you can always come back to the Emergency Department if you are not able to see your regular doctor in the amount of time listed above, if you get any new symptoms, or if there is anything that worries you.   "

## 2020-10-25 NOTE — ED AVS SNAPSHOT
[facility] Emergency Dept  [address]  [facility] [address]  Phone: [phone]  Fax: [phone]                                    Ana Laura Wharton   MRN: [id number]    Department: [facility] Emergency Dept   Date of Visit: 10/25/2020           After Visit Summary Signature Page    I have received my discharge instructions, and my questions have been answered. I have discussed any challenges I see with this plan with the nurse or doctor.    ..........................................................................................................................................  Patient/Patient Representative Signature      ..........................................................................................................................................  Patient Representative Print Name and Relationship to Patient    ..................................................               ................................................  Date                                   Time    ..........................................................................................................................................  Reviewed by Signature/Title    ...................................................              ..............................................  Date                                               Time          22EPIC Rev 08/18

## 2020-10-26 ENCOUNTER — TELEPHONE (OUTPATIENT)
Dept: PSYCHIATRY | Facility: CLINIC | Age: 50
End: 2020-10-26

## 2020-10-26 NOTE — TELEPHONE ENCOUNTER
[facility] Call Center    Phone Message    May a detailed message be left on voicemail: yes     Reason for Call: Other: Pt was instructed by her provider to call today to provide an update. Please call pt back.      Action Taken: Other: [facility]    Travel Screening: Not Applicable

## 2020-10-26 NOTE — TELEPHONE ENCOUNTER
"Writer called pt to explore her concerns.    Pt stated that she increased Zyprexa and \"it was a disaster\" due to feeling as if she was \"crawling out of my skin\" and so returned to the regular dose.    Pt reports that she didn't sleep for 2 nights, slept well last night.  Pt identified not feeling suicidal, stated \"I'm OK,\" she will continue with regular meds, and she has people coming over to help with her meds.    Pt inquired when should she check in next.    Pt presented with no signs of crying.    Routed to provider.          =========================================      Jaden Rodriguez MD Snyder, David J RN   Caller: Unspecified (Today,  9:59 AM)             I would like to see her Monday 11/2 at 1:30 via telephone.      Writer called pt and confirmed that she is available at that date and time. Routed message to scheduling to add appointment.                  "

## 2020-10-27 LAB
BACTERIA SPEC CULT: ABNORMAL
SPECIMEN SOURCE: ABNORMAL

## 2020-10-27 NOTE — RESULT ENCOUNTER NOTE
Final Urine Culture Report on 10/27/20  Emergency Dept/Urgent Care discharge antibiotic prescribed: Nitrofurantoin Macrocrystal-Monohydrate (Macrobid) 100 mg PO capsule, 1 capsule (100 mg) by mouth 2 times daily for 7 days  #1. Bacteria, >100,000 colonies/mL Escherichia coli, is SUSCEPTIBLE to Antibiotic.    As per [place] ED Lab Result protocol, no change in antibiotic therapy.

## 2020-10-31 ENCOUNTER — TELEPHONE (OUTPATIENT)
Dept: EMERGENCY MEDICINE | Facility: CLINIC | Age: 50
End: 2020-10-31

## 2020-10-31 NOTE — TELEPHONE ENCOUNTER
[facility] Emergency Department/Urgent Care Lab result notification [Positive for uti and bacteria is susceptible to antibiotic]:    Reason for call:   Notify of Final urine culture result, confirm patient is taking antibiotic, assess symptoms, and advise per Emergency Dept/Urgent Care discharge instructions and Emergency Dept urine culture protocol.    Lab Result & Date of Final Report [copied from Result Note]:    Final Urine Culture Report on 10/27/20  Emergency Dept/Urgent Care discharge antibiotic prescribed: Nitrofurantoin Macrocrystal-Monohydrate (Macrobid) 100 mg PO capsule, 1 capsule (100 mg) by mouth 2 times daily for 7 days  #1. Bacteria, >100,000 colonies/mL Escherichia coli, is SUSCEPTIBLE to Antibiotic.    As per [place] ED Lab Result protocol, no change in antibiotic therapy.    Current symptoms (include time patient called):    4:09PM: Spoke with patient. She states she is improving from a urinary standpoint, but is dealing with issues of constipation and diarrhea.     Recommendations/Instructions:   Patient notified of lab result and treatment recommendation.   Take antibiotic as directed by the Emergency Dept/Urgent Care Provider.  Advised to follow up with her PCP as directed by the ED provider. Advised to contact her clinic to discuss her bowel issues.   The patient is comfortable with the information given and has no further questions.     UTI prevention/education reviewed with patient [Yes/No]:  Yes    Please contact you PCP or return to the Emergency Department if your:    Symptoms return    Symptoms worsen or other concerning symptoms    Deisy Encarnacion RN  [employer] RN  Lung Nodule and ED Lab Result RN  Epic pool (ED late result f/u RN): [id number]  [facility] INCIDENTAL RADIOLOGY F/U NURSES: [id number]  Ph# [phone]

## 2020-11-02 ENCOUNTER — VIRTUAL VISIT (OUTPATIENT)
Dept: PSYCHIATRY | Facility: CLINIC | Age: 50
End: 2020-11-02
Attending: PSYCHIATRY & NEUROLOGY
Payer: COMMERCIAL

## 2020-11-02 DIAGNOSIS — F31.9 BIPOLAR I DISORDER (H): ICD-10-CM

## 2020-11-02 PROCEDURE — 99207 PR NON-BILLABLE SERV PER CHARTING: CPT | Mod: 95 | Performed by: PSYCHIATRY & NEUROLOGY

## 2020-11-02 RX ORDER — CLONAZEPAM 1 MG/1
TABLET ORAL
Qty: 120 TABLET | Refills: 0 | Status: SHIPPED | OUTPATIENT
Start: 2020-11-02 | End: 2020-11-28

## 2020-11-03 ENCOUNTER — TELEPHONE (OUTPATIENT)
Dept: PSYCHIATRY | Facility: CLINIC | Age: 50
End: 2020-11-03

## 2020-11-03 NOTE — TELEPHONE ENCOUNTER
[facility] Call Center    Phone Message    May a detailed message be left on voicemail: yes     Reason for Call: Other: Pt wanted to give  and update. Pt stated she slept through the night last night. She has had SI but does not plan to act on it nor has had the urge to. She would like to speak with Sly or Michael. Pt phone number is confirmed.      Action Taken: Patient transferred to: [facility]    Travel Screening: Not Applicable

## 2020-11-03 NOTE — TELEPHONE ENCOUNTER
Writer called pt to explore her concerns.    Pt identified that she has a friend coming over, someone is helping her to take care of her pill box, and she is having food delivered. She also reported that she is keeping up with ADLs such as brushing her teeth, fixing her hair, and applying makeup everyday. She reported that she has been in touch with friends.  Writer reinforced for pt the importance of doing these things.    Pt identified that she doesn't feel a need to go to hospital. Writer prompted pt to continue to reach out to friends and clinic for support.    Pt identified feeling that her life has been derailed with her divorce. Writer explored what pt feels is most important for her to keep her balance. Pt identified maintaining contact with her friends.  Writer prompted pt to continue to reach out to friends and clinic as needed.    Pt presented with less slurred speech and more focus on conversation during this call.    Routed to provider.

## 2020-11-03 NOTE — TELEPHONE ENCOUNTER
"Writer received call from pt, who requested that Dr. Rodriguez contact her regarding the appointment that she missed yesterday (11/2).    Pt stated \"I'm doing fine\" but that she was having a difficult time since her divorce.  She reports no improvement in sleep since previous contact.  Pt reported no SI, identified wanting to see her children as a protective factor.    Pt presented with slurred speech and was initially uncertain of which clinic she was calling.            =========================================      Jaden Rodriguez MD Snyder, David J RN   Caller: Unspecified (Today, 10:47 AM)             I spoke with her yesterday.  She was drinking and had a blackout.  Offer her the option of coming into the hospital to get off the alcohol and get the bipolar better treated.        Writer left voice mail message for pt, requesting callback as follow up to conferring with Dr. Rodriguez.      Writer received call from clinic staff, reporting that a call was received from the patient, who stated that she didn't want to be here any longer.      Writer called pt and reviewed provider's recommendation for hospitalization.  Patient stated \"I can't afford to do that.\" When insurance covering hospitalization was explored, pt identified that she could find out.  Pt stated \"tell Dr. Rodriguez that I'm fine\" and \"I'm a warrior\" and that she would think about hospitalization. Writer identified a plan to follow up with pt later today if communication was not received.          =========================================      Sly Herbert RN Rittberg, Barry R, MD    Cc: Sly Herbert RN   Caller: Unspecified (Today, 10:47 AM)             OK.     I'll try her later if she doesn't call me back.     Donald    Previous Messages    ----- Message -----   From: Jaden Rodriguez MD   Sent: 11/3/2020  12:21 PM CST   To: Fei Herbert, RN     I don't think calling them will help.  She was not suicidal when I spoke with her " yesterday.  If she was, we would call her friends.   ----- Message -----   From: Sly Herbert, RN   Sent: 11/3/2020  12:16 PM CST   To: Jaden Rodriguez MD, Sly Herbert, KISHA     She is putting up some resistance to coming into the hospital.   At first she said she couldn't afford it, then she said she'd think about it.     I see that we have a release to contact Devin Ledesma and Eli Hilliard, her friends.   Do you think that contacting them would be beneficial?     Donald           See other 11/3/20 telephone encounter for follow up.

## 2020-11-04 DIAGNOSIS — F31.9 BIPOLAR I DISORDER (H): ICD-10-CM

## 2020-11-09 ENCOUNTER — TELEPHONE (OUTPATIENT)
Dept: PSYCHIATRY | Facility: CLINIC | Age: 50
End: 2020-11-09

## 2020-11-09 RX ORDER — HYDROXYZINE PAMOATE 50 MG/1
CAPSULE ORAL
Qty: 270 CAPSULE | Refills: 0 | OUTPATIENT
Start: 2020-11-09

## 2020-11-09 NOTE — TELEPHONE ENCOUNTER
Jaden Rodriguez MD Snyder, David J RN   Caller: Unspecified (Today,  9:53 AM)             Friday, 11/13 at 1:30 pm    Previous Messages    ----- Message -----   From: Sly Herbert RN   Sent: 11/9/2020   2:09 PM CST   To: Jaden Rodriguez MD, Sly Herbert RN     ----- Message from Sly Herbert RN sent at 11/9/2020  2:09 PM CST -----   Jaden:     On 10/23 we increased her olanzapine PRN dose to 10 mg daily.   Do you have any availability this week?     Donald          Writer called pt, who agreed to the proposed time.  Routed message to scheduling to add appointment.

## 2020-11-09 NOTE — TELEPHONE ENCOUNTER
[facility] Call [facility]    Phone Message    May a detailed message be left on voicemail: yes     Reason for Call: Symptoms or Concerns     If patient has red-flag symptoms, warm transfer to triage line    Patient reports concerns regarding possible side effects of increased olanzapine. Patient says it feels like she is high or drunk, to the point that she cannot drive.  Patient would like to schedule telephone appointment with Dr. Rodriguez this week, if possible.      Action Taken: Message routed to:  Other: Sly Herbert RNCC    Travel Screening: Not Applicable

## 2020-11-12 ENCOUNTER — TELEPHONE (OUTPATIENT)
Dept: INTERNAL MEDICINE | Facility: CLINIC | Age: 50
End: 2020-11-12

## 2020-11-12 DIAGNOSIS — N39.0 URINARY TRACT INFECTION WITHOUT HEMATURIA, SITE UNSPECIFIED: Primary | ICD-10-CM

## 2020-11-12 NOTE — TELEPHONE ENCOUNTER
Patient was given nitroFURantoin macrocrystal-monohydrate in the ED for a UTI and she is still having symptoms of odor, cloudy urine and pain with urination. Please send a different medication to [facility] in [place]. [address] and advise patient.

## 2020-11-13 ENCOUNTER — VIRTUAL VISIT (OUTPATIENT)
Dept: PSYCHIATRY | Facility: CLINIC | Age: 50
End: 2020-11-13
Attending: PSYCHIATRY & NEUROLOGY
Payer: COMMERCIAL

## 2020-11-13 DIAGNOSIS — F31.9 BIPOLAR I DISORDER (H): ICD-10-CM

## 2020-11-13 PROCEDURE — 99207 PR NON-BILLABLE SERV PER CHARTING: CPT | Mod: 95 | Performed by: PSYCHIATRY & NEUROLOGY

## 2020-11-13 RX ORDER — HYDROXYZINE PAMOATE 50 MG/1
CAPSULE ORAL
Qty: 270 CAPSULE | Refills: 0 | Status: SHIPPED | OUTPATIENT
Start: 2020-11-13 | End: 2020-12-08

## 2020-11-18 ENCOUNTER — TELEPHONE (OUTPATIENT)
Dept: PSYCHIATRY | Facility: CLINIC | Age: 50
End: 2020-11-18

## 2020-11-18 NOTE — TELEPHONE ENCOUNTER
Writer received call from pt, who reviewed the content of her 11/18 [employer] message. Due to screen changes she was uncertain that the message came through.

## 2020-11-20 DIAGNOSIS — R82.90 NONSPECIFIC FINDING ON EXAMINATION OF URINE: Primary | ICD-10-CM

## 2020-11-20 DIAGNOSIS — N39.0 URINARY TRACT INFECTION WITHOUT HEMATURIA, SITE UNSPECIFIED: ICD-10-CM

## 2020-11-20 LAB
ALBUMIN UR-MCNC: NEGATIVE MG/DL
APPEARANCE UR: ABNORMAL
BACTERIA #/AREA URNS HPF: ABNORMAL /HPF
BILIRUB UR QL STRIP: NEGATIVE
COLOR UR AUTO: YELLOW
GLUCOSE UR STRIP-MCNC: NEGATIVE MG/DL
HGB UR QL STRIP: ABNORMAL
KETONES UR STRIP-MCNC: NEGATIVE MG/DL
LEUKOCYTE ESTERASE UR QL STRIP: ABNORMAL
NITRATE UR QL: POSITIVE
NON-SQ EPI CELLS #/AREA URNS LPF: ABNORMAL /LPF
PH UR STRIP: 7 PH (ref 5–7)
RBC #/AREA URNS AUTO: ABNORMAL /HPF
SOURCE: ABNORMAL
SP GR UR STRIP: 1.01 (ref 1–1.03)
UROBILINOGEN UR STRIP-ACNC: 0.2 EU/DL (ref 0.2–1)
WBC #/AREA URNS AUTO: >100 /HPF

## 2020-11-20 PROCEDURE — 87186 SC STD MICRODIL/AGAR DIL: CPT | Performed by: INTERNAL MEDICINE

## 2020-11-20 PROCEDURE — 87088 URINE BACTERIA CULTURE: CPT | Performed by: INTERNAL MEDICINE

## 2020-11-20 PROCEDURE — 87086 URINE CULTURE/COLONY COUNT: CPT | Performed by: INTERNAL MEDICINE

## 2020-11-20 PROCEDURE — 81001 URINALYSIS AUTO W/SCOPE: CPT | Performed by: INTERNAL MEDICINE

## 2020-11-20 RX ORDER — CIPROFLOXACIN 500 MG/1
500 TABLET, FILM COATED ORAL 2 TIMES DAILY
Qty: 14 TABLET | Refills: 0 | Status: SHIPPED | OUTPATIENT
Start: 2020-11-20 | End: 2020-11-28

## 2020-11-22 LAB
BACTERIA SPEC CULT: ABNORMAL
Lab: ABNORMAL
SPECIMEN SOURCE: ABNORMAL

## 2020-11-23 RX ORDER — NITROFURANTOIN MACROCRYSTAL 100 MG
100 CAPSULE ORAL 2 TIMES DAILY
Qty: 14 CAPSULE | Refills: 0 | Status: SHIPPED | OUTPATIENT
Start: 2020-11-23 | End: 2021-01-18

## 2020-11-23 NOTE — TELEPHONE ENCOUNTER
Per UA result note:    Call to pt and advised. She doesn't want to take the Cipro as she states it causes Cdiff. Reviewed with Dr Lincoln, he states he will wait for her Urine Culture to return then send in new Rx. Pt agrees with this.

## 2020-11-23 NOTE — TELEPHONE ENCOUNTER
See original note and advise. Patient was originally on Macrobid and symptoms did not resolve so repeat UA/UC was done.

## 2020-11-25 ENCOUNTER — OFFICE VISIT (OUTPATIENT)
Dept: OBGYN | Facility: CLINIC | Age: 50
End: 2020-11-25
Payer: COMMERCIAL

## 2020-11-25 ENCOUNTER — TELEPHONE (OUTPATIENT)
Dept: INTERNAL MEDICINE | Facility: CLINIC | Age: 50
End: 2020-11-25

## 2020-11-25 VITALS — WEIGHT: 133 LBS | SYSTOLIC BLOOD PRESSURE: 196 MMHG | BODY MASS INDEX: 21.47 KG/M2 | DIASTOLIC BLOOD PRESSURE: 106 MMHG

## 2020-11-25 DIAGNOSIS — Z30.46 ENCOUNTER FOR NEXPLANON REMOVAL: Primary | ICD-10-CM

## 2020-11-25 PROCEDURE — 11982 REMOVE DRUG IMPLANT DEVICE: CPT | Performed by: OBSTETRICS & GYNECOLOGY

## 2020-11-25 NOTE — TELEPHONE ENCOUNTER
Call to pt. She states she can check it at home. She will call back either today or Friday with the readings.

## 2020-11-25 NOTE — TELEPHONE ENCOUNTER
Patient here today for Nexplanon Removal.  Her blood pressure was elevated.  She states she would like to go home and feels fine except anxious about the procedure.   Will check BP this afternoon and call Dr. Lincoln's office with report.   Shantelle Luke CMA

## 2020-11-25 NOTE — TELEPHONE ENCOUNTER
See message below. Pt was at OBGYN today.   BP elevated.   FYI. Should we call her Friday?     BP Readings from Last 3 Encounters:   11/25/20 (!) 196/106   10/25/20 110/72   03/03/20 124/76

## 2020-11-25 NOTE — PROGRESS NOTES
INDICATIONS:                                                      Ana Laura is here today for removal of Nexplanon contraceptive implant.     Is a pregnancy test required: No.  Was a consent obtained?  Yes    Today's PHQ-2 Score:   PHQ-2 ( 1999 Pfizer) 4/10/2020   Q1: Little interest or pleasure in doing things 3   Q2: Feeling down, depressed or hopeless 3   PHQ-2 Score 6   Q1: Little interest or pleasure in doing things -   Q2: Feeling down, depressed or hopeless -   PHQ-2 Score -   Some encounter information is confidential and restricted. Go to Review Flowsheets activity to see all data.       PROCEDURE:                                                      Patient was placed in dorsal supine position with left arm abducted and externally rotated. Nexplanon was palpated under skin. The area was cleansed with betadine. The distal site was injected with 3 ml of 1% plain lidocaine.  While pushing down on the proximal end, 2 mm incision was made over the distal implant with a scalpel. The implant was grasped with a mosquito forceps and removed intact. The skin was closed with Steristrip. A pressure bandage was placed for the next 12-24 hours.  She tolerated the procedure well. There were no complications. Patient was discharged in stable condition.    Call if bleeding, pain or fever occur., Birth control counseling given. and Patient will use condoms for the next few months and see if menses return.  Consideration will then be made to check FSH vs initiating alternative contraception    Celso Mcwilliams MD

## 2020-11-25 NOTE — NURSING NOTE
"Chief Complaint   Patient presents with     Nexplanon Removal   3cc of 1% Lidocaine with epi drawn for local block.  0.5cc used  Lot # -[id number]  Exp 05/01/2021  initial BP (!) 196/106   Wt 60.3 kg (133 lb)   BMI 21.47 kg/m   Estimated body mass index is 21.47 kg/m  as calculated from the following:    Height as of 10/25/20: 1.676 m (5' 6\").    Weight as of this encounter: 60.3 kg (133 lb).  BP completed using cuff size regular.  Shantelle Luke CMA    "

## 2020-11-27 ENCOUNTER — APPOINTMENT (OUTPATIENT)
Dept: CT IMAGING | Facility: CLINIC | Age: 50
End: 2020-11-27
Attending: INTERNAL MEDICINE
Payer: COMMERCIAL

## 2020-11-27 ENCOUNTER — HOSPITAL ENCOUNTER (EMERGENCY)
Facility: CLINIC | Age: 50
Discharge: HOME | End: 2020-11-27
Attending: INTERNAL MEDICINE | Admitting: INTERNAL MEDICINE
Payer: COMMERCIAL

## 2020-11-27 VITALS
HEART RATE: 88 BPM | SYSTOLIC BLOOD PRESSURE: 135 MMHG | RESPIRATION RATE: 16 BRPM | OXYGEN SATURATION: 96 % | WEIGHT: 134.48 LBS | BODY MASS INDEX: 21.71 KG/M2 | TEMPERATURE: 98 F | DIASTOLIC BLOOD PRESSURE: 87 MMHG

## 2020-11-27 DIAGNOSIS — M79.672 LEFT FOOT PAIN: ICD-10-CM

## 2020-11-27 DIAGNOSIS — K76.6 PORTAL HYPERTENSION (H): ICD-10-CM

## 2020-11-27 DIAGNOSIS — R10.13 EPIGASTRIC PAIN: ICD-10-CM

## 2020-11-27 DIAGNOSIS — K86.3 PANCREATIC PSEUDOCYST: ICD-10-CM

## 2020-11-27 LAB
ALBUMIN SERPL-MCNC: 3.6 G/DL (ref 3.4–5)
ALBUMIN UR-MCNC: NEGATIVE MG/DL
ALP SERPL-CCNC: 272 U/L (ref 40–150)
ALT SERPL W P-5'-P-CCNC: 33 U/L (ref 0–50)
ANION GAP SERPL CALCULATED.3IONS-SCNC: 6 MMOL/L (ref 3–14)
APPEARANCE UR: CLEAR
AST SERPL W P-5'-P-CCNC: 131 U/L (ref 0–45)
B-HCG FREE SERPL-ACNC: <5 IU/L
BACTERIA #/AREA URNS HPF: ABNORMAL /HPF
BASOPHILS # BLD AUTO: 0.1 10E9/L (ref 0–0.2)
BASOPHILS NFR BLD AUTO: 0.6 %
BILIRUB SERPL-MCNC: 1 MG/DL (ref 0.2–1.3)
BILIRUB UR QL STRIP: NEGATIVE
BUN SERPL-MCNC: 8 MG/DL (ref 7–30)
CALCIUM SERPL-MCNC: 8.3 MG/DL (ref 8.5–10.1)
CHLORIDE SERPL-SCNC: 108 MMOL/L (ref 94–109)
CO2 SERPL-SCNC: 25 MMOL/L (ref 20–32)
COLOR UR AUTO: YELLOW
CREAT SERPL-MCNC: 0.74 MG/DL (ref 0.52–1.04)
DIFFERENTIAL METHOD BLD: ABNORMAL
EOSINOPHIL # BLD AUTO: 0.3 10E9/L (ref 0–0.7)
EOSINOPHIL NFR BLD AUTO: 2.1 %
ERYTHROCYTE [DISTWIDTH] IN BLOOD BY AUTOMATED COUNT: 15.6 % (ref 10–15)
ETHANOL SERPL-MCNC: 0.03 G/DL
GFR SERPL CREATININE-BSD FRML MDRD: >90 ML/MIN/{1.73_M2}
GLUCOSE SERPL-MCNC: 82 MG/DL (ref 70–99)
GLUCOSE UR STRIP-MCNC: NEGATIVE MG/DL
HCT VFR BLD AUTO: 43.1 % (ref 35–47)
HGB BLD-MCNC: 14.4 G/DL (ref 11.7–15.7)
HGB UR QL STRIP: NEGATIVE
IMM GRANULOCYTES # BLD: 0.1 10E9/L (ref 0–0.4)
IMM GRANULOCYTES NFR BLD: 0.7 %
KETONES UR STRIP-MCNC: NEGATIVE MG/DL
LEUKOCYTE ESTERASE UR QL STRIP: ABNORMAL
LIPASE SERPL-CCNC: 118 U/L (ref 73–393)
LYMPHOCYTES # BLD AUTO: 2.7 10E9/L (ref 0.8–5.3)
LYMPHOCYTES NFR BLD AUTO: 21.7 %
MCH RBC QN AUTO: 33.6 PG (ref 26.5–33)
MCHC RBC AUTO-ENTMCNC: 33.4 G/DL (ref 31.5–36.5)
MCV RBC AUTO: 101 FL (ref 78–100)
MONOCYTES # BLD AUTO: 1.3 10E9/L (ref 0–1.3)
MONOCYTES NFR BLD AUTO: 10.6 %
MUCOUS THREADS #/AREA URNS LPF: PRESENT /LPF
NEUTROPHILS # BLD AUTO: 8.1 10E9/L (ref 1.6–8.3)
NEUTROPHILS NFR BLD AUTO: 64.3 %
NITRATE UR QL: NEGATIVE
NRBC # BLD AUTO: 0 10*3/UL
NRBC BLD AUTO-RTO: 0 /100
PH UR STRIP: 7 PH (ref 5–7)
PLATELET # BLD AUTO: 136 10E9/L (ref 150–450)
POTASSIUM SERPL-SCNC: 4 MMOL/L (ref 3.4–5.3)
PROT SERPL-MCNC: 7.9 G/DL (ref 6.8–8.8)
RBC # BLD AUTO: 4.29 10E12/L (ref 3.8–5.2)
RBC #/AREA URNS AUTO: 1 /HPF (ref 0–2)
SODIUM SERPL-SCNC: 139 MMOL/L (ref 133–144)
SOURCE: ABNORMAL
SP GR UR STRIP: 1.01 (ref 1–1.03)
SQUAMOUS #/AREA URNS AUTO: 2 /HPF (ref 0–1)
UROBILINOGEN UR STRIP-MCNC: NORMAL MG/DL (ref 0–2)
WBC # BLD AUTO: 12.6 10E9/L (ref 4–11)
WBC #/AREA URNS AUTO: 3 /HPF (ref 0–5)

## 2020-11-27 PROCEDURE — 83690 ASSAY OF LIPASE: CPT | Performed by: INTERNAL MEDICINE

## 2020-11-27 PROCEDURE — 250N000011 HC RX IP 250 OP 636: Performed by: INTERNAL MEDICINE

## 2020-11-27 PROCEDURE — 81001 URINALYSIS AUTO W/SCOPE: CPT | Performed by: INTERNAL MEDICINE

## 2020-11-27 PROCEDURE — 80053 COMPREHEN METABOLIC PANEL: CPT | Performed by: INTERNAL MEDICINE

## 2020-11-27 PROCEDURE — 85025 COMPLETE CBC W/AUTO DIFF WBC: CPT | Performed by: INTERNAL MEDICINE

## 2020-11-27 PROCEDURE — 99285 EMERGENCY DEPT VISIT HI MDM: CPT | Mod: 25

## 2020-11-27 PROCEDURE — 96375 TX/PRO/DX INJ NEW DRUG ADDON: CPT

## 2020-11-27 PROCEDURE — 96374 THER/PROPH/DIAG INJ IV PUSH: CPT | Mod: 59

## 2020-11-27 PROCEDURE — 84702 CHORIONIC GONADOTROPIN TEST: CPT | Mod: GZ

## 2020-11-27 PROCEDURE — 80320 DRUG SCREEN QUANTALCOHOLS: CPT | Performed by: INTERNAL MEDICINE

## 2020-11-27 PROCEDURE — 250N000009 HC RX 250: Performed by: INTERNAL MEDICINE

## 2020-11-27 PROCEDURE — 96361 HYDRATE IV INFUSION ADD-ON: CPT

## 2020-11-27 PROCEDURE — 258N000003 HC RX IP 258 OP 636: Performed by: INTERNAL MEDICINE

## 2020-11-27 PROCEDURE — 74177 CT ABD & PELVIS W/CONTRAST: CPT

## 2020-11-27 RX ORDER — HYDROMORPHONE HYDROCHLORIDE 1 MG/ML
0.5 INJECTION, SOLUTION INTRAMUSCULAR; INTRAVENOUS; SUBCUTANEOUS ONCE
Status: COMPLETED | OUTPATIENT
Start: 2020-11-27 | End: 2020-11-27

## 2020-11-27 RX ORDER — ONDANSETRON 2 MG/ML
4 INJECTION INTRAMUSCULAR; INTRAVENOUS ONCE
Status: COMPLETED | OUTPATIENT
Start: 2020-11-27 | End: 2020-11-27

## 2020-11-27 RX ORDER — IOPAMIDOL 755 MG/ML
500 INJECTION, SOLUTION INTRAVASCULAR ONCE
Status: COMPLETED | OUTPATIENT
Start: 2020-11-27 | End: 2020-11-27

## 2020-11-27 RX ORDER — SODIUM CHLORIDE 9 MG/ML
1000 INJECTION, SOLUTION INTRAVENOUS CONTINUOUS
Status: DISCONTINUED | OUTPATIENT
Start: 2020-11-27 | End: 2020-11-27 | Stop reason: HOSPADM

## 2020-11-27 RX ADMIN — ONDANSETRON 4 MG: 2 INJECTION INTRAMUSCULAR; INTRAVENOUS at 17:49

## 2020-11-27 RX ADMIN — SODIUM CHLORIDE 57 ML: 9 INJECTION, SOLUTION INTRAVENOUS at 18:47

## 2020-11-27 RX ADMIN — SODIUM CHLORIDE 1000 ML: 9 INJECTION, SOLUTION INTRAVENOUS at 17:49

## 2020-11-27 RX ADMIN — IOPAMIDOL 68 ML: 755 INJECTION, SOLUTION INTRAVENOUS at 18:46

## 2020-11-27 RX ADMIN — HYDROMORPHONE HYDROCHLORIDE 0.5 MG: 1 INJECTION, SOLUTION INTRAMUSCULAR; INTRAVENOUS; SUBCUTANEOUS at 17:51

## 2020-11-27 ASSESSMENT — ENCOUNTER SYMPTOMS
ARTHRALGIAS: 1
NAUSEA: 1
ABDOMINAL PAIN: 1
DIARRHEA: 0
VOMITING: 1

## 2020-11-27 NOTE — ED PROVIDER NOTES
History   Chief Complaint  Abdominal Pain and Ankle Pain    HPI   Ana Laura Wharton is a 50 year old female with a history of pancreatitis, ETOH abuse, opioid dependence, bipolar disorder, and UTI's who presents for evaluation of localized epigastric abdominal pain and left ankle pain. The patient reports that the abdominal pain has been present for the past week now and is associated with some nausea and vomiting. She has been drinking heavily as of late, noting that she is unsure how much she has drank over the past few days. She drinks hard alcohol. The patient reports vomiting 7 times last night and 5 times today. Denies any associated blood in her vomit or diarrhea. The patient also twisted her ankle one month ago and has been experiencing continued pain. The patient was seen at [facility] at that time and was informed that she had a fracture. Denies any new injury to the ankle since then. Of note, the patient was recently diagnosed with a UTI and is currently taking Macrobid.     Allergies  Amoxicillin-Pot Clavulanate  Azithromycin  Cephalexin  Compazine  Metoclopramide  Minocycline  Penicillins  Reglan   Restoril   Thorazine  Abilify  Lamotrigine  Sulfa Drugs    Medications  Albuterol  Amlodipine  Lipitor  Coreg  Cipro  Clonazepam  Flexeril  Relpax  Flonase  Folvite  Midrin  Toradol  Synthroid  Remeron  Macrodantin  Zyprexa  Protonix  Senokot  Macrobid    Past Medical History  Anxiety  Bipolar disorder  C diff  Depression  GERD  Hypothyroidism  Migraines  Spontaneous pneumothorax  Suicide attempt  Overdose  Suicidal ideation  Acute renal failure  Opioid dependence  Alcohol abuse  orofacial dyskinesia  UTI  A fib  Pancreatitis    Past Surgical History  Left knee arthroscopy  Left shoulder surgery  Pleurodesis and lobe resection    Family History  Depression  Osteoporosis  Hyperlipidemia    Social History  Tobacco use: current every day smoker  Alcohol use: yes  Drug use: marijuana, opioids  Marital  Status:      Review of Systems   Gastrointestinal: Positive for abdominal pain, nausea and vomiting. Negative for diarrhea.   Musculoskeletal: Positive for arthralgias (R ankle).   All other systems reviewed and are negative.    Physical Exam     Patient Vitals for the past 24 hrs:   BP Temp Temp src Pulse Resp SpO2 Weight   11/27/20 1830 -- -- -- -- -- 96 % --   11/27/20 1800 121/74 -- -- 87 -- 93 % --   11/27/20 1745 (!) 130/90 -- -- -- -- -- --   11/27/20 1622 -- 98  F (36.7  C) Oral 88 18 96 % 61 kg (134 lb 7.7 oz)     Physical Exam  Constitutional:       Comments: Pleasant and cooperative   HENT:      Right Ear: Tympanic membrane normal.      Left Ear: Tympanic membrane normal.      Mouth/Throat:      Pharynx: No posterior oropharyngeal erythema.   Eyes:      Conjunctiva/sclera: Conjunctivae normal.   Neck:      Musculoskeletal: Neck supple.   Cardiovascular:      Rate and Rhythm: Normal rate and regular rhythm.      Heart sounds: Normal heart sounds.   Pulmonary:      Effort: Pulmonary effort is normal.      Breath sounds: Normal breath sounds.   Abdominal:      General: Bowel sounds are normal. There is no distension.      Palpations: Abdomen is soft.      Tenderness: There is abdominal tenderness in the epigastric area. There is no guarding or rebound.   Musculoskeletal: Normal range of motion.        Feet:    Skin:     General: Skin is warm and dry.   Neurological:      Mental Status: She is alert.         Emergency Department Course   Imaging:  Radiology findings were communicated with the patient who voiced understanding of the findings.    CT Abdomen/Pelvis with IV contrast:   1.  Pancreas is atrophic without evidence for acute inflammation. There is a bilobed 4.5 x 2.9 x 2.8 cm pancreatic pseudocyst anterior to the left adrenal gland and abutting the proximal body greater curvature of the stomach.   2.  Fatty liver. Patent paraumbilical vein with prominent anterior abdominal varices compatible  with portal hypertension. Prominent venous varicosities at the umbilicus.   3.  No appendicitis. as per radiology.    Laboratory:  Laboratory findings were communicated with the patient who voiced understanding of the findings.    CBC: WBC: 12.6 (H), HGB: 14.4, PLT: 136 (L)  CMP: Calcium 8.3 (L), alkphos 272 (H),  (H), o/w WNL (Creatinine: 0.74)  Lipase: 118  Alcohol level blood: 0.03 (H)  ISTAT HCG Quantitative pregnancy POCT: <5.0    UA with Microscopic reflex to culture: leukocyte esterase small (A), bacteria few (A), squamous epithelial 2 (H), mucous present (A), o/w WNL    Interventions:  1749 NS 1L IV  1749 Zofran 4 mg IV  1751 Dilaudid 0.5 mg IV    Emergency Department Course:  Past medical records, nursing notes, and vitals reviewed.    1740 I physically examined the patient as documented above.     IV was inserted and blood was drawn for laboratory testing, results above.     The patient provided a urine sample here in the emergency department. This was sent for laboratory testing, findings above.     The patient was sent for radiographs while in the emergency department, results above.     1912 I rechecked the patient and discussed the findings of their workup thus far.     Findings and plan explained to the Patient. Patient discharged home with instructions regarding supportive care, medications, and reasons to return. The importance of close follow-up was reviewed.     I personally reviewed the laboratory and imaging results with the Patient and answered all related questions prior to discharge.     Impression & Plan   Medical Decision Making:    Ana Laura Wharton is a 50 year old female with a long history of alcohol abuse who presents now with epigastric abdominal pain.  There is no evidence of acute pancreatitis by laboratory testing or imaging.  There is evidence of a pancreatic pseudocyst.  I doubt if this is the origin of her pain but we will have her follow-up closely with GI.  I did discuss  with her that there are also findings of portal hypertension on her CT and that this implies chronic liver disease.  She indicates that she gave away all of her alcohol earlier today and intends to remain sober.  She declined any offer of resources.  She also complained of multiple episodes of vomiting.  Despite this her electrolytes are normal and urine is dilute without ketones.  She is given Zofran and IV fluid here.  She also complains of pain in the left foot.  She asked for a different postoperative type shoe for this but actually the one she brought is suitable for either foot.  The tech assisted her to fit this.  She requested prescription pain medication which I declined.  She may use Tylenol as needed.  I told her I would avoid ibuprofen given that she may have gastritis or ulcer pending further follow-up.    Diagnosis:    ICD-10-CM    1. Epigastric pain  R10.13    2. Pancreatic pseudocyst  K86.3    3. Portal hypertension (H)  K76.6    4. Left foot pain  M79.672      Disposition:  Discharged to home.    Scribe Disclosure:  I, Lewis Espino, am serving as a scribe at 4:50 PM on 11/27/2020 to document services personally performed by Anna Lobo MD based on my observations and the provider's statements to me.      Anna Lobo MD  11/27/20 4027

## 2020-11-27 NOTE — ED TRIAGE NOTES
"Pt twisted left ankle one week ago and has continued pain.  She also has abdominal pain and reports hx of pancreatitis and feel she may have it again.  She was drinking alcohol for 7-10 days.  \"I just can't take it anymore\", referring to the pain.  She denies suicidal thoughts.  "

## 2020-11-27 NOTE — ED AVS SNAPSHOT
[facility] Emergency [facility]  [address]  [facility] [address]  Phone: [phone]  Fax: [phone]                                    Ana Laura Wharton   MRN: [id number]    Department: [facility]   Date of Visit: 11/27/2020           After Visit Summary Signature Page    I have received my discharge instructions, and my questions have been answered. I have discussed any challenges I see with this plan with the nurse or doctor.    ..........................................................................................................................................  Patient/Patient Representative Signature      ..........................................................................................................................................  Patient Representative Print Name and Relationship to Patient    ..................................................               ................................................  Date                                   Time    ..........................................................................................................................................  Reviewed by Signature/Title    ...................................................              ..............................................  Date                                               Time          22EPIC Rev 08/18

## 2020-11-28 ENCOUNTER — NURSE TRIAGE (OUTPATIENT)
Dept: NURSING | Facility: CLINIC | Age: 50
End: 2020-11-28

## 2020-11-28 ENCOUNTER — HOSPITAL ENCOUNTER (OUTPATIENT)
Facility: CLINIC | Age: 50
Setting detail: OBSERVATION
Discharge: HOME | End: 2020-11-29
Attending: EMERGENCY MEDICINE | Admitting: INTERNAL MEDICINE
Payer: COMMERCIAL

## 2020-11-28 DIAGNOSIS — K21.9 GASTROESOPHAGEAL REFLUX DISEASE WITHOUT ESOPHAGITIS: ICD-10-CM

## 2020-11-28 DIAGNOSIS — F10.930 ALCOHOL WITHDRAWAL SYNDROME WITHOUT COMPLICATION (H): ICD-10-CM

## 2020-11-28 DIAGNOSIS — K85.20 ALCOHOL-INDUCED ACUTE PANCREATITIS, UNSPECIFIED COMPLICATION STATUS: ICD-10-CM

## 2020-11-28 LAB
ALBUMIN SERPL-MCNC: 3.7 G/DL (ref 3.4–5)
ALP SERPL-CCNC: 269 U/L (ref 40–150)
ALT SERPL W P-5'-P-CCNC: 28 U/L (ref 0–50)
ANION GAP SERPL CALCULATED.3IONS-SCNC: 5 MMOL/L (ref 3–14)
AST SERPL W P-5'-P-CCNC: ABNORMAL U/L (ref 0–45)
BASE DEFICIT BLDV-SCNC: 0.5 MMOL/L
BASOPHILS # BLD AUTO: 0.1 10E9/L (ref 0–0.2)
BASOPHILS NFR BLD AUTO: 0.7 %
BILIRUB SERPL-MCNC: 1.5 MG/DL (ref 0.2–1.3)
BUN SERPL-MCNC: 8 MG/DL (ref 7–30)
CALCIUM SERPL-MCNC: 8.9 MG/DL (ref 8.5–10.1)
CHLORIDE SERPL-SCNC: 107 MMOL/L (ref 94–109)
CO2 SERPL-SCNC: 25 MMOL/L (ref 20–32)
CREAT SERPL-MCNC: 0.77 MG/DL (ref 0.52–1.04)
DIFFERENTIAL METHOD BLD: ABNORMAL
EOSINOPHIL # BLD AUTO: 0.3 10E9/L (ref 0–0.7)
EOSINOPHIL NFR BLD AUTO: 2.5 %
ERYTHROCYTE [DISTWIDTH] IN BLOOD BY AUTOMATED COUNT: 15.3 % (ref 10–15)
ETHANOL SERPL-MCNC: <0.01 G/DL
GFR SERPL CREATININE-BSD FRML MDRD: >90 ML/MIN/{1.73_M2}
GLUCOSE SERPL-MCNC: 113 MG/DL (ref 70–99)
HCO3 BLDV-SCNC: 24 MMOL/L (ref 21–28)
HCT VFR BLD AUTO: 43.9 % (ref 35–47)
HGB BLD-MCNC: 14.4 G/DL (ref 11.7–15.7)
IMM GRANULOCYTES # BLD: 0 10E9/L (ref 0–0.4)
IMM GRANULOCYTES NFR BLD: 0.4 %
LACTATE BLD-SCNC: 1.6 MMOL/L (ref 0.7–2)
LIPASE SERPL-CCNC: 82 U/L (ref 73–393)
LYMPHOCYTES # BLD AUTO: 2.3 10E9/L (ref 0.8–5.3)
LYMPHOCYTES NFR BLD AUTO: 22.1 %
MAGNESIUM SERPL-MCNC: 2.2 MG/DL (ref 1.6–2.3)
MCH RBC QN AUTO: 33.4 PG (ref 26.5–33)
MCHC RBC AUTO-ENTMCNC: 32.8 G/DL (ref 31.5–36.5)
MCV RBC AUTO: 102 FL (ref 78–100)
MONOCYTES # BLD AUTO: 1.1 10E9/L (ref 0–1.3)
MONOCYTES NFR BLD AUTO: 10.5 %
NEUTROPHILS # BLD AUTO: 6.6 10E9/L (ref 1.6–8.3)
NEUTROPHILS NFR BLD AUTO: 63.8 %
NRBC # BLD AUTO: 0 10*3/UL
NRBC BLD AUTO-RTO: 0 /100
O2/TOTAL GAS SETTING VFR VENT: ABNORMAL %
PCO2 BLDV: 36 MM HG (ref 40–50)
PH BLDV: 7.42 PH (ref 7.32–7.43)
PHOSPHATE SERPL-MCNC: 3.1 MG/DL (ref 2.5–4.5)
PLATELET # BLD AUTO: 104 10E9/L (ref 150–450)
PLATELET # BLD EST: ABNORMAL 10*3/UL
PO2 BLDV: 74 MM HG (ref 25–47)
POTASSIUM SERPL-SCNC: 4.2 MMOL/L (ref 3.4–5.3)
POTASSIUM SERPL-SCNC: 4.6 MMOL/L (ref 3.4–5.3)
PROT SERPL-MCNC: 8.1 G/DL (ref 6.8–8.8)
RBC # BLD AUTO: 4.31 10E12/L (ref 3.8–5.2)
RBC MORPH BLD: ABNORMAL
SARS-COV-2 RNA SPEC QL NAA+PROBE: NORMAL
SODIUM SERPL-SCNC: 137 MMOL/L (ref 133–144)
SPECIMEN SOURCE: NORMAL
WBC # BLD AUTO: 10.4 10E9/L (ref 4–11)

## 2020-11-28 PROCEDURE — 120N000001 HC R&B MED SURG/OB

## 2020-11-28 PROCEDURE — 99222 1ST HOSP IP/OBS MODERATE 55: CPT | Mod: AI | Performed by: INTERNAL MEDICINE

## 2020-11-28 PROCEDURE — 250N000011 HC RX IP 250 OP 636: Performed by: INTERNAL MEDICINE

## 2020-11-28 PROCEDURE — 84132 ASSAY OF SERUM POTASSIUM: CPT | Performed by: INTERNAL MEDICINE

## 2020-11-28 PROCEDURE — 96376 TX/PRO/DX INJ SAME DRUG ADON: CPT

## 2020-11-28 PROCEDURE — 83690 ASSAY OF LIPASE: CPT | Performed by: EMERGENCY MEDICINE

## 2020-11-28 PROCEDURE — 96374 THER/PROPH/DIAG INJ IV PUSH: CPT

## 2020-11-28 PROCEDURE — 258N000003 HC RX IP 258 OP 636: Performed by: INTERNAL MEDICINE

## 2020-11-28 PROCEDURE — 80320 DRUG SCREEN QUANTALCOHOLS: CPT | Performed by: EMERGENCY MEDICINE

## 2020-11-28 PROCEDURE — U0003 INFECTIOUS AGENT DETECTION BY NUCLEIC ACID (DNA OR RNA); SEVERE ACUTE RESPIRATORY SYNDROME CORONAVIRUS 2 (SARS-COV-2) (CORONAVIRUS DISEASE [COVID-19]), AMPLIFIED PROBE TECHNIQUE, MAKING USE OF HIGH THROUGHPUT TECHNOLOGIES AS DESCRIBED BY CMS-2020-01-R: HCPCS | Performed by: EMERGENCY MEDICINE

## 2020-11-28 PROCEDURE — 258N000003 HC RX IP 258 OP 636: Performed by: EMERGENCY MEDICINE

## 2020-11-28 PROCEDURE — 96361 HYDRATE IV INFUSION ADD-ON: CPT

## 2020-11-28 PROCEDURE — 84100 ASSAY OF PHOSPHORUS: CPT | Performed by: INTERNAL MEDICINE

## 2020-11-28 PROCEDURE — 99285 EMERGENCY DEPT VISIT HI MDM: CPT | Mod: 25

## 2020-11-28 PROCEDURE — 80053 COMPREHEN METABOLIC PANEL: CPT | Performed by: EMERGENCY MEDICINE

## 2020-11-28 PROCEDURE — C9803 HOPD COVID-19 SPEC COLLECT: HCPCS

## 2020-11-28 PROCEDURE — 83735 ASSAY OF MAGNESIUM: CPT | Performed by: INTERNAL MEDICINE

## 2020-11-28 PROCEDURE — 250N000013 HC RX MED GY IP 250 OP 250 PS 637: Performed by: INTERNAL MEDICINE

## 2020-11-28 PROCEDURE — 96375 TX/PRO/DX INJ NEW DRUG ADDON: CPT

## 2020-11-28 PROCEDURE — 85025 COMPLETE CBC W/AUTO DIFF WBC: CPT | Performed by: EMERGENCY MEDICINE

## 2020-11-28 PROCEDURE — 36415 COLL VENOUS BLD VENIPUNCTURE: CPT | Performed by: INTERNAL MEDICINE

## 2020-11-28 PROCEDURE — 83605 ASSAY OF LACTIC ACID: CPT | Performed by: EMERGENCY MEDICINE

## 2020-11-28 PROCEDURE — 82803 BLOOD GASES ANY COMBINATION: CPT | Performed by: EMERGENCY MEDICINE

## 2020-11-28 PROCEDURE — 250N000011 HC RX IP 250 OP 636: Performed by: EMERGENCY MEDICINE

## 2020-11-28 RX ORDER — LORAZEPAM 1 MG/1
1-2 TABLET ORAL EVERY 30 MIN PRN
Status: DISCONTINUED | OUTPATIENT
Start: 2020-11-28 | End: 2020-11-29 | Stop reason: HOSPADM

## 2020-11-28 RX ORDER — SENNOSIDES 8.6 MG
4 TABLET ORAL DAILY
COMMUNITY
End: 2021-01-18

## 2020-11-28 RX ORDER — HALOPERIDOL 5 MG/ML
5 INJECTION INTRAMUSCULAR ONCE
Status: COMPLETED | OUTPATIENT
Start: 2020-11-28 | End: 2020-11-28

## 2020-11-28 RX ORDER — CARVEDILOL 12.5 MG/1
12.5 TABLET ORAL 2 TIMES DAILY WITH MEALS
Status: DISCONTINUED | OUTPATIENT
Start: 2020-11-28 | End: 2020-11-29 | Stop reason: HOSPADM

## 2020-11-28 RX ORDER — FOLIC ACID 1 MG/1
1 TABLET ORAL DAILY
Status: DISCONTINUED | OUTPATIENT
Start: 2020-11-28 | End: 2020-11-29 | Stop reason: HOSPADM

## 2020-11-28 RX ORDER — AMOXICILLIN 250 MG
2 CAPSULE ORAL 2 TIMES DAILY PRN
Status: DISCONTINUED | OUTPATIENT
Start: 2020-11-28 | End: 2020-11-29 | Stop reason: HOSPADM

## 2020-11-28 RX ORDER — POLYETHYLENE GLYCOL 3350 17 G/17G
17 POWDER, FOR SOLUTION ORAL DAILY PRN
Status: DISCONTINUED | OUTPATIENT
Start: 2020-11-28 | End: 2020-11-29 | Stop reason: HOSPADM

## 2020-11-28 RX ORDER — OLANZAPINE 15 MG/1
15 TABLET ORAL AT BEDTIME
COMMUNITY
End: 2021-01-25

## 2020-11-28 RX ORDER — CLONAZEPAM 2 MG/1
2 TABLET ORAL AT BEDTIME
COMMUNITY
End: 2020-12-08

## 2020-11-28 RX ORDER — ONDANSETRON 2 MG/ML
4 INJECTION INTRAMUSCULAR; INTRAVENOUS EVERY 6 HOURS PRN
Status: DISCONTINUED | OUTPATIENT
Start: 2020-11-28 | End: 2020-11-29 | Stop reason: HOSPADM

## 2020-11-28 RX ORDER — LORAZEPAM 2 MG/ML
1-2 INJECTION INTRAMUSCULAR EVERY 30 MIN PRN
Status: DISCONTINUED | OUTPATIENT
Start: 2020-11-28 | End: 2020-11-29 | Stop reason: HOSPADM

## 2020-11-28 RX ORDER — LORAZEPAM 2 MG/ML
1 INJECTION INTRAMUSCULAR ONCE
Status: COMPLETED | OUTPATIENT
Start: 2020-11-28 | End: 2020-11-28

## 2020-11-28 RX ORDER — AMLODIPINE BESYLATE 5 MG/1
5 TABLET ORAL DAILY
Status: DISCONTINUED | OUTPATIENT
Start: 2020-11-29 | End: 2020-11-29 | Stop reason: HOSPADM

## 2020-11-28 RX ORDER — OXYCODONE HYDROCHLORIDE 5 MG/1
5-10 TABLET ORAL
Status: DISCONTINUED | OUTPATIENT
Start: 2020-11-28 | End: 2020-11-29 | Stop reason: HOSPADM

## 2020-11-28 RX ORDER — SODIUM CHLORIDE 9 MG/ML
INJECTION, SOLUTION INTRAVENOUS CONTINUOUS
Status: DISCONTINUED | OUTPATIENT
Start: 2020-11-28 | End: 2020-11-28

## 2020-11-28 RX ORDER — NALOXONE HYDROCHLORIDE 0.4 MG/ML
0.2 INJECTION, SOLUTION INTRAMUSCULAR; INTRAVENOUS; SUBCUTANEOUS
Status: DISCONTINUED | OUTPATIENT
Start: 2020-11-28 | End: 2020-11-29 | Stop reason: HOSPADM

## 2020-11-28 RX ORDER — HYDROMORPHONE HYDROCHLORIDE 1 MG/ML
0.5 INJECTION, SOLUTION INTRAMUSCULAR; INTRAVENOUS; SUBCUTANEOUS
Status: COMPLETED | OUTPATIENT
Start: 2020-11-28 | End: 2020-11-28

## 2020-11-28 RX ORDER — OLANZAPINE 5 MG/1
5 TABLET ORAL EVERY MORNING
Status: DISCONTINUED | OUTPATIENT
Start: 2020-11-29 | End: 2020-11-29 | Stop reason: HOSPADM

## 2020-11-28 RX ORDER — LANOLIN ALCOHOL/MO/W.PET/CERES
200 CREAM (GRAM) TOPICAL 3 TIMES DAILY
Status: DISCONTINUED | OUTPATIENT
Start: 2020-11-28 | End: 2020-11-29 | Stop reason: HOSPADM

## 2020-11-28 RX ORDER — ALBUTEROL SULFATE 90 UG/1
2 AEROSOL, METERED RESPIRATORY (INHALATION) EVERY 6 HOURS PRN
Status: DISCONTINUED | OUTPATIENT
Start: 2020-11-28 | End: 2020-11-29 | Stop reason: HOSPADM

## 2020-11-28 RX ORDER — NALOXONE HYDROCHLORIDE 0.4 MG/ML
0.4 INJECTION, SOLUTION INTRAMUSCULAR; INTRAVENOUS; SUBCUTANEOUS
Status: DISCONTINUED | OUTPATIENT
Start: 2020-11-28 | End: 2020-11-29 | Stop reason: HOSPADM

## 2020-11-28 RX ORDER — MIRTAZAPINE 15 MG/1
60 TABLET, FILM COATED ORAL AT BEDTIME
Status: DISCONTINUED | OUTPATIENT
Start: 2020-11-28 | End: 2020-11-29 | Stop reason: HOSPADM

## 2020-11-28 RX ORDER — ACETAMINOPHEN 325 MG/1
650 TABLET ORAL EVERY 4 HOURS PRN
Status: DISCONTINUED | OUTPATIENT
Start: 2020-11-28 | End: 2020-11-29 | Stop reason: HOSPADM

## 2020-11-28 RX ORDER — LIDOCAINE 40 MG/G
CREAM TOPICAL
Status: DISCONTINUED | OUTPATIENT
Start: 2020-11-28 | End: 2020-11-29 | Stop reason: HOSPADM

## 2020-11-28 RX ORDER — LANOLIN ALCOHOL/MO/W.PET/CERES
100 CREAM (GRAM) TOPICAL DAILY
Status: DISCONTINUED | OUTPATIENT
Start: 2020-12-06 | End: 2020-11-29 | Stop reason: HOSPADM

## 2020-11-28 RX ORDER — LEVOTHYROXINE SODIUM 50 UG/1
50 TABLET ORAL DAILY
Status: DISCONTINUED | OUTPATIENT
Start: 2020-11-29 | End: 2020-11-29 | Stop reason: HOSPADM

## 2020-11-28 RX ORDER — AMOXICILLIN 250 MG
1 CAPSULE ORAL 2 TIMES DAILY PRN
Status: DISCONTINUED | OUTPATIENT
Start: 2020-11-28 | End: 2020-11-29 | Stop reason: HOSPADM

## 2020-11-28 RX ORDER — ONDANSETRON 4 MG/1
4 TABLET, ORALLY DISINTEGRATING ORAL EVERY 6 HOURS PRN
Status: DISCONTINUED | OUTPATIENT
Start: 2020-11-28 | End: 2020-11-29 | Stop reason: HOSPADM

## 2020-11-28 RX ORDER — GABAPENTIN 300 MG/1
300 CAPSULE ORAL EVERY 8 HOURS
Status: DISCONTINUED | OUTPATIENT
Start: 2020-12-04 | End: 2020-11-29 | Stop reason: HOSPADM

## 2020-11-28 RX ORDER — FLUTICASONE PROPIONATE 50 MCG
1 SPRAY, SUSPENSION (ML) NASAL DAILY PRN
Status: DISCONTINUED | OUTPATIENT
Start: 2020-11-28 | End: 2020-11-29 | Stop reason: HOSPADM

## 2020-11-28 RX ORDER — GABAPENTIN 300 MG/1
600 CAPSULE ORAL EVERY 8 HOURS
Status: DISCONTINUED | OUTPATIENT
Start: 2020-12-02 | End: 2020-11-29 | Stop reason: HOSPADM

## 2020-11-28 RX ORDER — PANTOPRAZOLE SODIUM 40 MG/1
40 TABLET, DELAYED RELEASE ORAL
Status: DISCONTINUED | OUTPATIENT
Start: 2020-11-29 | End: 2020-11-29

## 2020-11-28 RX ORDER — OLANZAPINE 5 MG/1
5 TABLET ORAL EVERY MORNING
COMMUNITY
End: 2021-01-15

## 2020-11-28 RX ORDER — CLONAZEPAM 1 MG/1
1.5 TABLET ORAL EVERY MORNING
COMMUNITY
End: 2020-12-08

## 2020-11-28 RX ORDER — GABAPENTIN 100 MG/1
100 CAPSULE ORAL EVERY 8 HOURS
Status: DISCONTINUED | OUTPATIENT
Start: 2020-12-06 | End: 2020-11-29 | Stop reason: HOSPADM

## 2020-11-28 RX ORDER — GABAPENTIN 300 MG/1
900 CAPSULE ORAL EVERY 8 HOURS
Status: DISCONTINUED | OUTPATIENT
Start: 2020-11-29 | End: 2020-11-29 | Stop reason: HOSPADM

## 2020-11-28 RX ORDER — SODIUM CHLORIDE 9 MG/ML
INJECTION, SOLUTION INTRAVENOUS CONTINUOUS
Status: DISCONTINUED | OUTPATIENT
Start: 2020-11-28 | End: 2020-11-29 | Stop reason: HOSPADM

## 2020-11-28 RX ORDER — BISACODYL 10 MG
10 SUPPOSITORY, RECTAL RECTAL DAILY PRN
Status: DISCONTINUED | OUTPATIENT
Start: 2020-11-28 | End: 2020-11-29 | Stop reason: HOSPADM

## 2020-11-28 RX ORDER — MULTIPLE VITAMINS W/ MINERALS TAB 9MG-400MCG
1 TAB ORAL DAILY
Status: DISCONTINUED | OUTPATIENT
Start: 2020-11-28 | End: 2020-11-29 | Stop reason: HOSPADM

## 2020-11-28 RX ORDER — LANOLIN ALCOHOL/MO/W.PET/CERES
100 CREAM (GRAM) TOPICAL 3 TIMES DAILY
Status: DISCONTINUED | OUTPATIENT
Start: 2020-11-30 | End: 2020-11-29 | Stop reason: HOSPADM

## 2020-11-28 RX ORDER — HYDROMORPHONE HYDROCHLORIDE 1 MG/ML
.3-.5 INJECTION, SOLUTION INTRAMUSCULAR; INTRAVENOUS; SUBCUTANEOUS
Status: DISCONTINUED | OUTPATIENT
Start: 2020-11-28 | End: 2020-11-29 | Stop reason: HOSPADM

## 2020-11-28 RX ORDER — GABAPENTIN 600 MG/1
1200 TABLET ORAL ONCE
Status: DISCONTINUED | OUTPATIENT
Start: 2020-11-28 | End: 2020-11-29 | Stop reason: HOSPADM

## 2020-11-28 RX ORDER — HALOPERIDOL 5 MG/ML
5 INJECTION INTRAMUSCULAR ONCE
Status: DISCONTINUED | OUTPATIENT
Start: 2020-11-28 | End: 2020-11-28

## 2020-11-28 RX ORDER — LORAZEPAM 2 MG/ML
0.5 INJECTION INTRAMUSCULAR
Status: DISCONTINUED | OUTPATIENT
Start: 2020-11-28 | End: 2020-11-29 | Stop reason: HOSPADM

## 2020-11-28 RX ORDER — POLYETHYLENE GLYCOL 3350 17 G/17G
1 POWDER, FOR SOLUTION ORAL DAILY PRN
COMMUNITY

## 2020-11-28 RX ORDER — NITROFURANTOIN MACROCRYSTALS 50 MG/1
100 CAPSULE ORAL 2 TIMES DAILY
Status: DISCONTINUED | OUTPATIENT
Start: 2020-11-28 | End: 2020-11-29 | Stop reason: HOSPADM

## 2020-11-28 RX ORDER — FLUMAZENIL 0.1 MG/ML
0.2 INJECTION, SOLUTION INTRAVENOUS
Status: DISCONTINUED | OUTPATIENT
Start: 2020-11-28 | End: 2020-11-29 | Stop reason: HOSPADM

## 2020-11-28 RX ADMIN — OLANZAPINE 15 MG: 10 TABLET, FILM COATED ORAL at 21:03

## 2020-11-28 RX ADMIN — HALOPERIDOL LACTATE 5 MG: 5 INJECTION, SOLUTION INTRAMUSCULAR at 13:46

## 2020-11-28 RX ADMIN — NITROFURANTOIN MACROCRYSTALS 100 MG: 50 CAPSULE ORAL at 21:03

## 2020-11-28 RX ADMIN — SODIUM CHLORIDE: 9 INJECTION, SOLUTION INTRAVENOUS at 19:36

## 2020-11-28 RX ADMIN — HYDROMORPHONE HYDROCHLORIDE 0.5 MG: 1 INJECTION, SOLUTION INTRAMUSCULAR; INTRAVENOUS; SUBCUTANEOUS at 16:31

## 2020-11-28 RX ADMIN — LORAZEPAM 1 MG: 2 INJECTION INTRAMUSCULAR; INTRAVENOUS at 16:31

## 2020-11-28 RX ADMIN — SODIUM CHLORIDE 1000 ML: 9 INJECTION, SOLUTION INTRAVENOUS at 13:45

## 2020-11-28 RX ADMIN — HYDROMORPHONE HYDROCHLORIDE 0.5 MG: 1 INJECTION, SOLUTION INTRAMUSCULAR; INTRAVENOUS; SUBCUTANEOUS at 13:45

## 2020-11-28 RX ADMIN — HYDROMORPHONE HYDROCHLORIDE 0.3 MG: 1 INJECTION, SOLUTION INTRAMUSCULAR; INTRAVENOUS; SUBCUTANEOUS at 19:29

## 2020-11-28 RX ADMIN — LORAZEPAM 0.5 MG: 2 INJECTION INTRAMUSCULAR; INTRAVENOUS at 21:10

## 2020-11-28 RX ADMIN — CARVEDILOL 12.5 MG: 12.5 TABLET, FILM COATED ORAL at 19:31

## 2020-11-28 RX ADMIN — MIRTAZAPINE 60 MG: 15 TABLET, FILM COATED ORAL at 21:11

## 2020-11-28 ASSESSMENT — ACTIVITIES OF DAILY LIVING (ADL): ADLS_ACUITY_SCORE: 12

## 2020-11-28 ASSESSMENT — ENCOUNTER SYMPTOMS
FEVER: 0
FREQUENCY: 0
ABDOMINAL DISTENTION: 1
CONSTIPATION: 0
SHORTNESS OF BREATH: 0
DYSURIA: 0
DIARRHEA: 0
CHILLS: 0
VOMITING: 1
HEMATURIA: 0
NAUSEA: 1
ABDOMINAL PAIN: 1
COUGH: 0
DIFFICULTY URINATING: 0

## 2020-11-28 NOTE — ED PROVIDER NOTES
History     Chief Complaint:  Abdominal Pain     The history is provided by the patient and medical records (Room 26).     Ana Laura Wharton is a 50 year old year old female with a history of atrial fibrillation, acute renal failure, pancreatitis secondary to alcohol use, and hypothyroidism, who presents for evaluation of abdominal pain. Per chart review, the patient was seen here in the ED that has been present for the last week. Patient also reports nausea and several episodes of vomiting, without blood. To note, the patient reports she has been drinking heavily (hard alcohol) as of late, noting that she is unsure how much she has drank over the past few days, see full workup below.     Since then, the patient's abdominal pain has worsened, keeping her up all night, and she has been unable to keep anything down secondary to her vomiting. She also reports some abdominal distention and feels like something is stuck her epigastric region. Patient denies a history of abdominal surgeries.    Patient denies any fever, chills, cough, shortness of breath, urinary symptoms, or changes in bowel.    ED Workup from 11/27/2020 (One day ago):    Imaging:  Radiology findings were communicated with the patient who voiced understanding of the findings.     CT Abdomen/Pelvis with IV contrast:   1.  Pancreas is atrophic without evidence for acute inflammation. There is a bilobed 4.5 x 2.9 x 2.8 cm pancreatic pseudocyst anterior to the left adrenal gland and abutting the proximal body greater curvature of the stomach.   2.  Fatty liver. Patent paraumbilical vein with prominent anterior abdominal varices compatible with portal hypertension. Prominent venous varicosities at the umbilicus.   3.  No appendicitis. as per radiology.     Laboratory:  Laboratory findings were communicated with the patient who voiced understanding of the findings.     CBC: WBC: 12.6 (H), HGB: 14.4, PLT: 136 (L)  CMP: Calcium 8.3 (L), alkphos 272 (H),   (H), o/w WNL (Creatinine: 0.74)  Lipase: 118  Alcohol level blood: 0.03 (H)  ISTAT HCG Quantitative pregnancy POCT: <5.0     UA with Microscopic reflex to culture: leukocyte esterase small (A), bacteria few (A), squamous epithelial 2 (H), mucous present (A), o/w WNL    Allergies  Amoxicillin-Pot Clavulanate  Azithromycin  Cephalexin  Compazine  Metoclopramide  Minocycline  Penicillins  Reglan   Restoril   Thorazine  Abilify  Lamotrigine  Sulfa Drugs     Medications  Albuterol  Amlodipine  Lipitor  Coreg  Cipro  Clonazepam  Flexeril  Relpax  Flonase  Folvite  Midrin  Toradol  Synthroid  Remeron  Macrodantin  Zyprexa  Protonix  Senokot  Macrobid     Past Medical History  Anxiety  Bipolar disorder  C diff  Depression  GERD  Hypothyroidism  Migraines  Spontaneous pneumothorax  Suicide attempt  Overdose  Suicidal ideation  Acute renal failure  Opioid dependence  Alcohol abuse  orofacial dyskinesia  UTI  A fib  Pancreatitis     Past Surgical History  Left knee arthroscopy  Left shoulder surgery  Pleurodesis and lobe resection     Family History  Depression  Osteoporosis  Hyperlipidemia     Social History  Tobacco use: current every day smoker  Alcohol use: yes  Drug use: marijuana, opioids  Marital Status:     Review of Systems   Constitutional: Negative for chills and fever.   Respiratory: Negative for cough and shortness of breath.    Gastrointestinal: Positive for abdominal distention, abdominal pain, nausea and vomiting. Negative for constipation and diarrhea.   Genitourinary: Negative for decreased urine volume, difficulty urinating, dysuria, frequency, hematuria and urgency.   All other systems reviewed and are negative.        Physical Exam     Patient Vitals for the past 24 hrs:   BP Temp Pulse Resp SpO2   11/28/20 1700 139/88 -- 82 -- --   11/28/20 1600 133/82 -- 76 -- --   11/28/20 1500 130/79 -- 77 -- 96 %   11/28/20 1400 129/79 -- 81 -- 97 %   11/28/20 1345 -- -- -- -- 97 %   11/28/20 1330 -- -- -- --  96 %   11/28/20 1315 -- -- -- -- 98 %   11/28/20 1255 (!) 123/99 97.6  F (36.4  C) 84 16 99 %       Physical Exam  Constitutional: Alert, intermittently tearful, stops crying to speak.  HENT:    Nose: Nose normal.    Mouth/Throat: Oropharynx is clear, mucous membranes are moist  Eyes: EOM are normal. Pupils are equal, round, and reactive to light.   CV: Regular rate and rhythm, no murmurs, rubs or gallops.  Resp: Clear lungs to auscultation, all lung fields. Normal respiratory effort.   GI: Soft, non-distended. There is mild epigastric tenderness. No rebound or guarding.   MSK: Normal range of motion. No deformity.   Neurological:   A/Ox3  5/5 strength is symmetric to the upper and lower extremities;   Sensation intact to light touch throughout the upper and lower extremities;   Skin: Skin is warm and dry.    Emergency Department Course   Laboratory:  Laboratory findings were communicated with the patient and Admitting MD who voiced understanding of the findings.    CBC: WBC: 10.4, HGB: 14.4, PLT: 104 (L)  CMP: Glucose: 113 (H), Bilirubin Total: 1.5 (H), Alkaline Phosphatase: 269 (H), o/w WNL (Creatinine: 0.77)    VBG and oxyhgb: pH 7.42 / PCO2 36 (L) / PO2 36 (L) / Bicarb 74 (H), o/w WNL    Lipase: 82    Alcohol ethyl: <0.01    1352 Lactic Acid: 1.6    Asymptomatic COVID-19 PCR: Pending      Interventions:  1345 NS 1L IV  1345 Dilaudid 0.5 mg IV  1346 Haldol 5 mg IV  1631 Ativan 1 mg IV  1631 Dilaudid 0.5 mg IV    Emergency Department Course:  Past medical records, nursing notes, and vitals reviewed.    1:13 PM I performed an exam of the patient as documented above.      IV was inserted and blood was drawn for laboratory testing, results above.    1443 I rechecked the patient and discussed the results of her workup thus far.     1528 Findings and plan explained to the Patient who consents to admission. Discussed the patient with Dr. Capps, who will admit the patient to a [facility] bed for further monitoring,  evaluation, and treatment.    I personally reviewed the laboratory results with the Patient and answered all related questions prior to admission.     Impression & Plan     COVID-19  Ana Laura Wharton was evaluated during a global COVID-19 pandemic, which necessitated consideration that the patient might be at risk for infection with the SARS-CoV-2 virus that causes COVID-19.   Applicable protocols for evaluation were followed during the patient's care.   COVID-19 was considered as part of the patient's evaluation. The plan for testing is:  a test was obtained during this visit.    Medical Decision Makin year old female who comes in with abdominal pain in the setting of a history of pancreatitis, alcohol abuse. Exam as above. Labs obtained. Patient seen here yesterday. Labs unremarkable; however, given the patient's presentation I favor acute on chronic pancreatitis. At this point, I believe she warrants admission to the hospital for pain control and continued evaluation/observation of her recurrent abdominal pain. Clinical suspicion for bowel obstruction/perforation is low. Given the patient's heavy alcohol use history, I believe she will likely go into withdrawal during her admission and patient was admitted to the hospital under the care of Dr. Capps.    Diagnosis:    ICD-10-CM    1. Alcohol-induced acute pancreatitis, unspecified complication status  K85.20    2. Alcohol withdrawal syndrome without complication (H)  F10.230        Disposition:  Admitted to Dr. Capps.    Scribe Disclosure:  I, Janelle Ross, am serving as a scribe on 2020 at 1:13 PM to personally document services performed by Sly Nixon DO based on my observations and the provider's statements to me.      Sly Nixon DO  20 [id number]

## 2020-11-28 NOTE — PHARMACY-ADMISSION MEDICATION HISTORY
Admission medication history interview status for this patient is complete. See [facility] admission navigator for allergy information, prior to admission medications and immunization status.     Medication history interview done via telephone during Covid-19 pandemic, indicate source(s): Patient  Medication history resources (including written lists, pill bottles, clinic record):[facility] med [facility]  Pharmacy: [facility]    Changes made to PTA medication list:  Added: Miralax  Deleted: Cipro, Midrin  Changed: senna, Klonopin, Zyprexa dosing    Actions taken by pharmacist (provider contacted, etc):removed amoxicillin and penicillin from allergy list     Additional medication history information:None    Medication reconciliation/reorder completed by provider prior to medication history?  Y   (Y/N)         Prior to Admission medications    Medication Sig Last Dose Taking? Auth Provider   amLODIPine (NORVASC) 5 MG tablet Take 1 tablet (5 mg) by mouth daily 11/28/2020 at am Yes Liborio Lincoln MD   atorvastatin (LIPITOR) 10 MG tablet TAKE 1 TABLET EVERY DAY 11/28/2020 at am Yes Liborio Lincoln MD   carvedilol (COREG) 12.5 MG tablet Take 1 tablet (12.5 mg) by mouth 2 times daily (with meals) 11/28/2020 at 1 dose Yes Liborio Lincoln MD   clonazePAM (KLONOPIN) 1 MG tablet Take 1.5 mg by mouth every morning 11/28/2020 at am Yes Unknown, Entered By History   clonazePAM (KLONOPIN) 2 MG tablet Take 2 mg by mouth At Bedtime 11/27/2020 at bedtime Yes Unknown, Entered By History   cyclobenzaprine (FLEXERIL) 10 MG tablet TAKE 1 TABLET (10 MG) BY MOUTH AT BEDTIME 11/27/2020 at HS Yes Liborio Lincoln MD   eletriptan (RELPAX) 40 MG tablet Take 2 tablets (80 mg) by mouth at onset of headache for migraine May repeat in 2 hours. Max 2 tablets/24 hours. Past Week at Unknown time Yes Liborio Lincoln MD   FIBER PO Take 1 capsule by mouth daily  11/28/2020 at am Yes Unknown, Entered By History   fluticasone (FLONASE) 50  MCG/ACT nasal spray SHAKE LIQUID AND USE 1 SPRAY IN EACH NOSTRIL DAILY Past Month at Unknown time Yes Liborio Lincoln MD   folic acid (FOLVITE) 400 MCG tablet Take 400 mcg by mouth daily 11/28/2020 at am Yes Unknown, Entered By History   hydrOXYzine (VISTARIL) 50 MG capsule Take 1 capsule (50 mg) by mouth 2 times daily. May also take 1 capsule (50 mg) daily as needed for anxiety. 11/28/2020 at am Yes Jaden Rodriguez MD   ibuprofen (ADVIL/MOTRIN) 400 MG tablet Take 1 tablet (400 mg) by mouth 3 times daily Past Week at Unknown time Yes Liborio Lincoln MD   ketorolac (TORADOL) 30 MG/ML injection Inject 1 mL (30 mg) into the muscle every 6 hours as needed for moderate pain Past Month at Unknown time Yes Liborio Lincoln MD   levothyroxine (SYNTHROID/LEVOTHROID) 50 MCG tablet TAKE 1 TABLET EVERY DAY 11/28/2020 at am Yes Liborio Lincoln MD   mirtazapine (REMERON) 30 MG tablet Take 2 tablets (60 mg) by mouth At Bedtime 11/27/2020 at HS Yes Jaden Rodriguez MD   Multiple Vitamins-Minerals (WOMENS MULTI VITAMIN & MINERAL PO) Take 1 tablet by mouth daily 11/28/2020 at am Yes Unknown, Entered By History   nitroFURantoin macrocrystal (MACRODANTIN) 100 MG capsule Take 1 capsule (100 mg) by mouth 2 times daily 11/28/2020 at am Yes Liborio Lincoln MD   OLANZapine (ZYPREXA) 15 MG tablet Take 15 mg by mouth At Bedtime 11/27/2020 at HS Yes Unknown, Entered By History   OLANZapine (ZYPREXA) 5 MG tablet Take 5 mg by mouth every morning 11/28/2020 at am Yes Unknown, Entered By History   ondansetron (ZOFRAN) 4 MG tablet Take 1 tablet (4 mg) by mouth every 8 hours as needed for nausea 11/27/2020 at Unknown time Yes Liborio Lincoln MD   pantoprazole (PROTONIX) 40 MG EC tablet Take 1 tablet (40 mg) by mouth every morning (before breakfast) 11/28/2020 at am Yes Liborio Lincoln MD   polyethylene glycol (MIRALAX) 17 g packet Take 1 packet by mouth daily as needed for constipation 11/28/2020 at am Yes Unknown,  "Entered By History   sennosides (SENOKOT) 8.6 MG tablet Take 4 tablets by mouth daily 11/28/2020 at am Yes Unknown, Entered By History   acetaminophen (TYLENOL) 500 MG tablet Take 1-2 tablets (500-1,000 mg) by mouth every 6 hours as needed for mild pain More than a month at Unknown time  Liborio Lincoln MD   albuterol (PROAIR HFA/PROVENTIL HFA/VENTOLIN HFA) 108 (90 Base) MCG/ACT inhaler Inhale 2 puffs into the lungs every 6 hours as needed for shortness of breath / dyspnea or wheezing More than a month at Unknown time  Liborio Lincoln MD   sodium chloride (OCEAN) 0.65 % nasal spray Use in both nostrils 3 times a day. More than a month at Unknown time  Liborio Lincoln MD   Syringe/Needle, Disp, (SYRINGE LUER LOCK) 25G X 1\" 3 ML MISC 1 Act once a week   Liborio Lincoln MD         "

## 2020-11-28 NOTE — ED NOTES
"[facility]  ED Nurse Handoff Report    Ana Laura Wharton is a 50 year old female   ED Chief complaint: Abdominal Pain  . ED Diagnosis:   Final diagnoses:   None     Allergies:   Allergies   Allergen Reactions     Amoxicillin-Pot Clavulanate      PN: LW Reaction: Itching, Pruritis     Azithromycin      Other reaction(s): Dermatitis     Cephalexin      PN: LW Reaction: Itching, Pruritis     Compazine [Prochlorperazine] Other (See Comments)     dystonia     Metoclopramide Other (See Comments)     \"I feel like I am crawling out of my skin\"     Minocycline Nausea and Vomiting     PN: DIARRHEA, VOMITING, AND STOMACH CRAMPS     Penicillins Itching     Reglan [Metoclopramide Hcl] Other (See Comments)     Sensation of \"crawling out of skin\"     Restoril [Temazepam]      Thorazine [Chlorpromazine] Other (See Comments)     dystonia     Abilify [Aripiprazole] Rash     Lamotrigine Rash     Severe drug rash - contraindication to receiving again. Skin peeling.      Sulfa Drugs Rash       Code Status: Full Code  Activity level - Baseline/Home:  Independent. Activity Level - Current:   Stand by Assist. Lift room needed: No. Bariatric: No   Needed: No   Isolation: No. Infection: Not Applicable.     Vital Signs:   Vitals:    11/28/20 1315 11/28/20 1330 11/28/20 1345 11/28/20 1400   BP:    129/79   Pulse:    81   Resp:       Temp:       SpO2: 98% 96% 97% 97%       Cardiac Rhythm:  ,      Pain level:    Patient confused: No. Patient Falls Risk: Yes.   Elimination Status: Has voided   Patient Report - Initial Complaint: Abdominal pain. Focused Assessment: Gastrointestinal - Gastrointestinal WDL: all  Abdominal Palpation: All Quadrants (Tenderness in RUQ and LUQ, also right over the umbilicus) Nausea/Vomiting Signs/Symptoms: nausea intermittent  GI Signs/Symptoms: abdominal discomfort  Gastrointestinal Comment: Pt states coming to the ED yesterday. Since, pt says abdominal pain has gotten worse and she was up all " night throwing up. Pt says that she has zofran at home but it isn't helping.    Tests Performed: labs. Abnormal Results:   Labs Ordered and Resulted from Time of ED Arrival Up to the Time of Departure from the ED   COMPREHENSIVE METABOLIC PANEL - Abnormal; Notable for the following components:       Result Value    Glucose 113 (*)     Bilirubin Total 1.5 (*)     Alkaline Phosphatase 269 (*)     All other components within normal limits   CBC WITH PLATELETS DIFFERENTIAL - Abnormal; Notable for the following components:     (*)     MCH 33.4 (*)     RDW 15.3 (*)     Platelet Count 104 (*)     All other components within normal limits   BLOOD GAS VENOUS - Abnormal; Notable for the following components:    PCO2 Venous 36 (*)     PO2 Venous 74 (*)     All other components within normal limits   LIPASE   LACTIC ACID WHOLE BLOOD   ALCOHOL ETHYL   PERIPHERAL IV CATHETER     .   Treatments provided: see MAR  Family Comments: N/A  OBS brochure/video discussed/provided to patient:  No  ED Medications:   Medications   0.9% sodium chloride BOLUS (1,000 mLs Intravenous New Bag 11/28/20 1345)     Followed by   sodium chloride 0.9% infusion (has no administration in time range)   HYDROmorphone (PF) (DILAUDID) injection 0.5 mg (0.5 mg Intravenous Given 11/28/20 1345)   haloperidol lactate (HALDOL) injection 5 mg (5 mg Intravenous Given 11/28/20 1346)     Drips infusing:  No  For the majority of the shift, the patient's behavior Green. Interventions performed were N/A.    Sepsis treatment initiated: No     Patient tested for COVID 19 prior to admission: Yes    ED Nurse Name/Phone Number: Rosario Solano RN,   3:00 PM  RECEIVING UNIT ED HANDOFF REVIEW    Above ED Nurse Handoff Report was reviewed: Yes  Reviewed by: Ilda Tnoy LPN on November 28, 2020 at 6:04 PM

## 2020-11-28 NOTE — ED TRIAGE NOTES
"Patient presents to the ED reporting ongoing abdominal pain. States was seen yesterday in ED for same symptoms. Reports pain with eating and drinking. Reports feeling like \"something is stuck\" in the chest and throat when swallows. Additionally reports umbilical tenderness.   "

## 2020-11-28 NOTE — TELEPHONE ENCOUNTER
"Abdominal pain . Ongoing , she is unable to stand up now without extreme pain, She was seen in the ED last night, and feels worse than when she was seen .     Reason for Disposition    [1] SEVERE pain (e.g., excruciating) AND [2] present > 1 hour    Additional Information    Negative: Shock suspected (e.g., cold/pale/clammy skin, too weak to stand, low BP, rapid pulse)    Negative: Difficult to awaken or acting confused (e.g., disoriented, slurred speech)    Negative: Passed out (i.e., lost consciousness, collapsed and was not responding)    Negative: Sounds like a life-threatening emergency to the triager    Negative: Chest pain    Pain is mainly in upper abdomen  (if needed ask: \"is it mainly above the belly button?\")    Negative: Severe difficulty breathing (e.g., struggling for each breath, speaks in single words)    Negative: Shock suspected (e.g., cold/pale/clammy skin, too weak to stand, low BP, rapid pulse)    Negative: Difficult to awaken or acting confused (e.g., disoriented, slurred speech)    Negative: Passed out (i.e., lost consciousness, collapsed and was not responding)    Negative: Visible sweat on face or sweat dripping down face    Negative: Sounds like a life-threatening emergency to the triager    Negative: Followed an abdomen (stomach) injury    Negative: Chest pain    Protocols used: ABDOMINAL PAIN - UPPER-A-AH, ABDOMINAL PAIN - FEMALE-A-AH      "

## 2020-11-29 ENCOUNTER — APPOINTMENT (OUTPATIENT)
Dept: ULTRASOUND IMAGING | Facility: CLINIC | Age: 50
End: 2020-11-29
Attending: INTERNAL MEDICINE
Payer: COMMERCIAL

## 2020-11-29 VITALS
SYSTOLIC BLOOD PRESSURE: 125 MMHG | DIASTOLIC BLOOD PRESSURE: 78 MMHG | TEMPERATURE: 97.8 F | OXYGEN SATURATION: 98 % | HEART RATE: 81 BPM | RESPIRATION RATE: 18 BRPM

## 2020-11-29 LAB
ALBUMIN SERPL-MCNC: 3.2 G/DL (ref 3.4–5)
ALBUMIN UR-MCNC: NEGATIVE MG/DL
ALP SERPL-CCNC: 226 U/L (ref 40–150)
ALT SERPL W P-5'-P-CCNC: 23 U/L (ref 0–50)
ANION GAP SERPL CALCULATED.3IONS-SCNC: 8 MMOL/L (ref 3–14)
APPEARANCE UR: CLEAR
AST SERPL W P-5'-P-CCNC: 88 U/L (ref 0–45)
BILIRUB SERPL-MCNC: 1 MG/DL (ref 0.2–1.3)
BILIRUB UR QL STRIP: NEGATIVE
BUN SERPL-MCNC: 7 MG/DL (ref 7–30)
CALCIUM SERPL-MCNC: 8.3 MG/DL (ref 8.5–10.1)
CHLORIDE SERPL-SCNC: 112 MMOL/L (ref 94–109)
CO2 SERPL-SCNC: 22 MMOL/L (ref 20–32)
COLOR UR AUTO: ABNORMAL
CREAT SERPL-MCNC: 0.68 MG/DL (ref 0.52–1.04)
ERYTHROCYTE [DISTWIDTH] IN BLOOD BY AUTOMATED COUNT: 15.4 % (ref 10–15)
GFR SERPL CREATININE-BSD FRML MDRD: >90 ML/MIN/{1.73_M2}
GLUCOSE SERPL-MCNC: 76 MG/DL (ref 70–99)
GLUCOSE UR STRIP-MCNC: NEGATIVE MG/DL
HCT VFR BLD AUTO: 39.2 % (ref 35–47)
HGB BLD-MCNC: 12.9 G/DL (ref 11.7–15.7)
HGB UR QL STRIP: NEGATIVE
KETONES UR STRIP-MCNC: ABNORMAL MG/DL
LABORATORY COMMENT REPORT: NORMAL
LEUKOCYTE ESTERASE UR QL STRIP: NEGATIVE
LIPASE SERPL-CCNC: 87 U/L (ref 73–393)
MCH RBC QN AUTO: 33.9 PG (ref 26.5–33)
MCHC RBC AUTO-ENTMCNC: 32.9 G/DL (ref 31.5–36.5)
MCV RBC AUTO: 103 FL (ref 78–100)
NITRATE UR QL: NEGATIVE
PH UR STRIP: 6.5 PH (ref 5–7)
PLATELET # BLD AUTO: 105 10E9/L (ref 150–450)
POTASSIUM SERPL-SCNC: 4.1 MMOL/L (ref 3.4–5.3)
PROT SERPL-MCNC: 6.7 G/DL (ref 6.8–8.8)
RBC # BLD AUTO: 3.81 10E12/L (ref 3.8–5.2)
RBC #/AREA URNS AUTO: <1 /HPF (ref 0–2)
SARS-COV-2 RNA SPEC QL NAA+PROBE: NEGATIVE
SODIUM SERPL-SCNC: 142 MMOL/L (ref 133–144)
SOURCE: ABNORMAL
SP GR UR STRIP: 1.01 (ref 1–1.03)
SPECIMEN SOURCE: NORMAL
SQUAMOUS #/AREA URNS AUTO: <1 /HPF (ref 0–1)
UROBILINOGEN UR STRIP-MCNC: NORMAL MG/DL (ref 0–2)
WBC # BLD AUTO: 6.8 10E9/L (ref 4–11)
WBC #/AREA URNS AUTO: <1 /HPF (ref 0–5)

## 2020-11-29 PROCEDURE — 96376 TX/PRO/DX INJ SAME DRUG ADON: CPT

## 2020-11-29 PROCEDURE — 36415 COLL VENOUS BLD VENIPUNCTURE: CPT | Performed by: INTERNAL MEDICINE

## 2020-11-29 PROCEDURE — 250N000013 HC RX MED GY IP 250 OP 250 PS 637: Performed by: INTERNAL MEDICINE

## 2020-11-29 PROCEDURE — 99207 PR CDG-CODE CATEGORY CHANGED: CPT | Performed by: INTERNAL MEDICINE

## 2020-11-29 PROCEDURE — 258N000003 HC RX IP 258 OP 636: Performed by: INTERNAL MEDICINE

## 2020-11-29 PROCEDURE — 81001 URINALYSIS AUTO W/SCOPE: CPT | Performed by: INTERNAL MEDICINE

## 2020-11-29 PROCEDURE — 999N000127 HC STATISTIC PERIPHERAL IV START W US GUIDANCE

## 2020-11-29 PROCEDURE — G0378 HOSPITAL OBSERVATION PER HR: HCPCS

## 2020-11-29 PROCEDURE — 85027 COMPLETE CBC AUTOMATED: CPT | Performed by: INTERNAL MEDICINE

## 2020-11-29 PROCEDURE — 99217 PR OBSERVATION CARE DISCHARGE: CPT | Performed by: INTERNAL MEDICINE

## 2020-11-29 PROCEDURE — 83690 ASSAY OF LIPASE: CPT | Performed by: INTERNAL MEDICINE

## 2020-11-29 PROCEDURE — 80053 COMPREHEN METABOLIC PANEL: CPT | Performed by: INTERNAL MEDICINE

## 2020-11-29 PROCEDURE — 76700 US EXAM ABDOM COMPLETE: CPT

## 2020-11-29 PROCEDURE — 96361 HYDRATE IV INFUSION ADD-ON: CPT

## 2020-11-29 PROCEDURE — 250N000011 HC RX IP 250 OP 636: Performed by: INTERNAL MEDICINE

## 2020-11-29 RX ORDER — FAMOTIDINE 10 MG
10 TABLET ORAL 2 TIMES DAILY
Qty: 60 TABLET | Refills: 0 | Status: SHIPPED | OUTPATIENT
Start: 2020-11-29 | End: 2021-01-18

## 2020-11-29 RX ORDER — CLONAZEPAM 0.5 MG/1
1 TABLET ORAL DAILY
Status: DISCONTINUED | OUTPATIENT
Start: 2020-11-29 | End: 2020-11-29

## 2020-11-29 RX ORDER — FAMOTIDINE 10 MG
10 TABLET ORAL 2 TIMES DAILY
Status: DISCONTINUED | OUTPATIENT
Start: 2020-11-29 | End: 2020-11-29 | Stop reason: HOSPADM

## 2020-11-29 RX ORDER — PANTOPRAZOLE SODIUM 40 MG/1
40 TABLET, DELAYED RELEASE ORAL 2 TIMES DAILY
Qty: 60 TABLET | Refills: 0 | Status: SHIPPED | OUTPATIENT
Start: 2020-11-29 | End: 2021-01-18

## 2020-11-29 RX ADMIN — LEVOTHYROXINE SODIUM 50 MCG: 50 TABLET ORAL at 09:51

## 2020-11-29 RX ADMIN — THIAMINE HCL TAB 100 MG 200 MG: 100 TAB at 09:51

## 2020-11-29 RX ADMIN — OLANZAPINE 5 MG: 5 TABLET, FILM COATED ORAL at 09:57

## 2020-11-29 RX ADMIN — LORAZEPAM 0.5 MG: 2 INJECTION INTRAMUSCULAR; INTRAVENOUS at 11:45

## 2020-11-29 RX ADMIN — PANTOPRAZOLE SODIUM 40 MG: 40 TABLET, DELAYED RELEASE ORAL at 05:49

## 2020-11-29 RX ADMIN — SODIUM CHLORIDE: 9 INJECTION, SOLUTION INTRAVENOUS at 02:40

## 2020-11-29 RX ADMIN — MULTIPLE VITAMINS W/ MINERALS TAB 1 TABLET: TAB at 09:52

## 2020-11-29 RX ADMIN — HYDROMORPHONE HYDROCHLORIDE 0.5 MG: 1 INJECTION, SOLUTION INTRAMUSCULAR; INTRAVENOUS; SUBCUTANEOUS at 09:52

## 2020-11-29 RX ADMIN — FAMOTIDINE 10 MG: 10 TABLET, FILM COATED ORAL at 11:45

## 2020-11-29 RX ADMIN — NITROFURANTOIN MACROCRYSTALS 100 MG: 50 CAPSULE ORAL at 09:57

## 2020-11-29 RX ADMIN — FOLIC ACID 1 MG: 1 TABLET ORAL at 09:51

## 2020-11-29 RX ADMIN — THIAMINE HCL TAB 100 MG 200 MG: 100 TAB at 13:33

## 2020-11-29 RX ADMIN — CARVEDILOL 12.5 MG: 12.5 TABLET, FILM COATED ORAL at 09:51

## 2020-11-29 RX ADMIN — AMLODIPINE BESYLATE 5 MG: 5 TABLET ORAL at 09:52

## 2020-11-29 RX ADMIN — HYDROMORPHONE HYDROCHLORIDE 0.5 MG: 1 INJECTION, SOLUTION INTRAMUSCULAR; INTRAVENOUS; SUBCUTANEOUS at 13:33

## 2020-11-29 SDOH — ECONOMIC STABILITY: TRANSPORTATION INSECURITY
IN THE PAST 12 MONTHS, HAS THE LACK OF TRANSPORTATION KEPT YOU FROM MEDICAL APPOINTMENTS OR FROM GETTING MEDICATIONS?: NO

## 2020-11-29 SDOH — ECONOMIC STABILITY: INCOME INSECURITY: HOW HARD IS IT FOR YOU TO PAY FOR THE VERY BASICS LIKE FOOD, HOUSING, MEDICAL CARE, AND HEATING?: NOT ASKED

## 2020-11-29 SDOH — HEALTH STABILITY: PHYSICAL HEALTH: ON AVERAGE, HOW MANY DAYS PER WEEK DO YOU ENGAGE IN MODERATE TO STRENUOUS EXERCISE (LIKE A BRISK WALK)?: 5 DAYS

## 2020-11-29 SDOH — ECONOMIC STABILITY: FOOD INSECURITY: WITHIN THE PAST 12 MONTHS, THE FOOD YOU BOUGHT JUST DIDN'T LAST AND YOU DIDN'T HAVE MONEY TO GET MORE.: NOT ASKED

## 2020-11-29 SDOH — HEALTH STABILITY: MENTAL HEALTH
STRESS IS WHEN SOMEONE FEELS TENSE, NERVOUS, ANXIOUS, OR CAN'T SLEEP AT NIGHT BECAUSE THEIR MIND IS TROUBLED. HOW STRESSED ARE YOU?: RATHER MUCH

## 2020-11-29 SDOH — ECONOMIC STABILITY: TRANSPORTATION INSECURITY
IN THE PAST 12 MONTHS, HAS LACK OF TRANSPORTATION KEPT YOU FROM MEETINGS, WORK, OR FROM GETTING THINGS NEEDED FOR DAILY LIVING?: NO

## 2020-11-29 SDOH — HEALTH STABILITY: PHYSICAL HEALTH: ON AVERAGE, HOW MANY MINUTES DO YOU ENGAGE IN EXERCISE AT THIS LEVEL?: 30 MIN

## 2020-11-29 SDOH — ECONOMIC STABILITY: FOOD INSECURITY: WITHIN THE PAST 12 MONTHS, YOU WORRIED THAT YOUR FOOD WOULD RUN OUT BEFORE YOU GOT MONEY TO BUY MORE.: NOT ASKED

## 2020-11-29 ASSESSMENT — ACTIVITIES OF DAILY LIVING (ADL)
ADLS_ACUITY_SCORE: 11
DEPENDENT_IADLS:: INDEPENDENT

## 2020-11-29 NOTE — PROGRESS NOTES
Call to patient, message left to call.   Pt states she cannot afford another day in the hospital.  Requested to be discharged.  Pt told to go to the emergency department if her symptoms worsen.  Pt expressed understanding and agreement.  Pt discharged home.

## 2020-11-29 NOTE — PLAN OF CARE
Patient alert and oriented, slept most of the night. CIWA scores were 3 and 3. No PRN pain medication given overnight. This writer noticed that patient had a bottle of powerade on her nightstand, patient reeducated on the importance of maintaining NPO for pancreatitis. No nausea medication given overnight. VSS, up with SBA. Continue with current plan of care.

## 2020-11-29 NOTE — UTILIZATION REVIEW
"  Admission Status; Secondary Review Determination         Under the authority of the Utilization Management Committee, the utilization review process indicated a secondary review on the above patient.  The review outcome is based on review of the medical records, discussions with staff, and applying clinical experience noted on the date of the review.          (x) Observation Status Appropriate - This patient does not meet hospital inpatient criteria and is placed in observation status. If this patient's primary payer is Medicare and was admitted as an inpatient, Condition Code 44 should be used and patient status changed to \"observation\".     RATIONALE FOR DETERMINATION   50-year-old female with a history of bipolar disorder, hypertension, GERD, and hypothyroidism along with past medical history as outlined below who presents here with epigastric abdominal pain.  Patient does have a history of alcohol misuse disorder but denies any history of significant alcohol withdrawal nor prior admission for withdrawal symptoms. CT of the abdomen performed.  It demonstrated an atrophic pancreas with a pseudocyst which appears stable.  Otherwise, no other pathology to explain her abdominal pain, nausea, and vomiting.    Lipase this morning is 89. Patient is receiving supportive care. The severity of illness, intensity of service provided, expected LOS and risk for adverse outcome make the care appropriate for further observation; however, doesn't meet criteria for hospital inpatient admission. Dr. Iftikhar Sesay  notified of this determination.    This document was produced using voice recognition software.      The information on this document is developed by the utilization review team in order for the business office to ensure compliance.  This only denotes the appropriateness of proper admission status and does not reflect the quality of care rendered.         The definitions of Inpatient Status and Observation Status used in " making the determination above are those provided in the CMS Coverage Manual, Chapter 1 and Chapter 6, section 70.4.      Sincerely,     SARAH GARCIA MD    System Medical Director  Utilization Management  [facility].

## 2020-11-29 NOTE — PLAN OF CARE
BP (!) 140/86   Pulse 77   Temp 97.7  F (36.5  C) (Temporal)   Resp 16   SpO2 95%   Orientation: A&Ox4, CIWA 4, PRN ativan given for anxiety.   Resp: LS Clear, RA  Cardio: HX of A-Fib, remote tele -NS.  Pain: Managed with IV dilaudid.  CMS: Intact  Activity: SBA   GI: no complaints of nausea, pt continues to ask for a regular diet, educated several times of the purpose of her NPO status, but continues to think she should eat, can have ice chips and sips with meds.  Voiding: spontaneously without difficulty.  WIll continue to monitor.

## 2020-11-29 NOTE — CONSULTS
Care Management Initial Consult    General Information  Assessment completed with: Patient,    Type of CM/SW Visit: Initial Assessment  Primary Care Provider verified and updated as needed: Yes   Readmission within the last 30 days: previous discharge plan unsuccessful   Return Category: Progression of disease     Advance Care Planning: Advance Care Planning Reviewed: no concerns identified          Communication Assessment  Patient's communication style: spoken language (English or Bilingual)             Cognitive  Cognitive/Neuro/Behavioral: WDL                      Living Environment:   People in home: alone     Current living Arrangements: apartment      Able to return to prior arrangements: yes       Family/Social Support:  Care provided by: self  Provides care for: no one  Marital Status:   Other (specify)          Description of Support System: Supportive;Involved    Support Assessment: Adequate family and caregiver support    Current Resources:   Skilled Home Care Services:    Community Resources:    Equipment currently used at home: none  Supplies currently used at home: None    Employment/Financial:  Employment Status: disabled        Financial Concerns: No concerns identified           Lifestyle & Psychosocial Needs:  Lifestyle     Physical activity     Days per week: 5 days     Minutes per session: 30 min     Stress: Rather much     Social Needs     Financial resource strain: Not on file     Food insecurity     Worry: Not on file     Inability: Not on file     Transportation needs     Medical: No     Non-medical: No     Socioeconomic History     Marital status:      Spouse name: Not on file     Number of children: Not on file     Years of education: Not on file     Highest education level: Not on file     Tobacco Use     Smoking status: Current Every Day Smoker     Packs/day: 0.50     Types: Cigarettes     Smokeless tobacco: Never Used     Tobacco comment: pt states she is quiting and  declined resources   Substance and Sexual Activity     Alcohol use: Yes     Alcohol/week: 2.0 standard drinks     Types: 2 Glasses of wine per week     Comment: 1 glass of wine per week     Drug use: Yes     Frequency: 0.5 times per week     Types: Marijuana     Sexual activity: Yes     Partners: Male     Birth control/protection: OCP       Functional Status:  Prior to admission patient needed assistance:   Dependent ADLs:: Independent  Dependent IADLs:: Independent       Mental Health Status:  Mental Health Status: Current Concern  Mental Health Management: Individual Therapy;Psychiatrist    Chemical Dependency Status:  Chemical Dependency Status: Current Concern             Values/Beliefs:  Spiritual, Cultural Beliefs, Catholic Practices, Values that affect care: yes          Values/Beliefs Comment: (believe in [place])    Additional Information:  Patient admitted with acute on chronic pancreatitis. she has history of alcohol dependency.  Patient said she feels isolated and overwhelmed which is contributing to her alcohol and marijuana use.  Patient does have a psychologist and psychiatrist that she sees on a regular basis and talks with family for support. Pt decline CD consult, but did accept CD resources.  Pt is planning to quit smoking at discharge, she declined resources. Pt is open to spiritual health consult- referral made. She will drive herself home at discharge.    Lisa Collins RN, BSN, PHN, CTS  Care Coordinator  [facility]  [phone]        Lisa Collins RN

## 2020-11-29 NOTE — PLAN OF CARE
Pt A&O, vitals monitored this shift. Teary and painful, resting comfortably between cares.  IV infusing.  Abd US completed this shift and diet advanced to clear liquids.  IV ativan administered prn.  CIWA 6,5.  IV dilaudid administered for pain.  SW consult placed.

## 2020-11-29 NOTE — H&P
[facility]  Hospitalist Admission Note  November 28, 2020  Name: Ana Laura Wharton    MRN: [id number]  YOB: 1970    Age: 50 year old  Date of admission: 11/28/2020  Primary care provider: Liborio Lincoln      Summary:  Patient is a pleasant 50-year-old female with a history of bipolar disorder, hypertension, GERD, and hypothyroidism along with past medical history as outlined below who presents here with epigastric abdominal pain.  Patient does have a history of alcohol misuse disorder but denies any history of significant alcohol withdrawal nor prior admission for withdrawal symptoms.  She does report drinking hard liquor on a daily basis and quite frequently over the past week.  She did stop the day before because patient had significant intractable nausea, vomiting, abdominal pain.  Vomiting was nonbloody in nature.  Patient actually presented to the ED yesterday and had a CT of the abdomen performed.  It demonstrated an atrophic pancreas with a pseudocyst which appears stable.  Otherwise, no other pathology to explain her abdominal pain, nausea, and vomiting.  Last bowel movement was about 2 days ago.  No sick contacts, unusual ingestion, or travel.  Patient reports that since release from the ED yesterday, patient continued to have lack of p.o. intake secondary to nausea and had not been able to keep anything down.  She has had greater than 14 episodes of nonbloody emesis and progressive abdominal pain which brought her in for assessment.  On work-up in the ED, a lipase was normal but not unexpected given her atrophic pancreas from suspected chronic recurrent pancreatitis secondary to alcohol abuse.  Chemistries were otherwise unremarkable clear.  Her AST yesterday was 131 and she did have a mildly elevated alk phos of 269.  White count was normal at 10.4.  Patient reports that she is currently being treated for urinary tract infection and is on a 7-day  course of Macrobid.  Her alcohol level today is nondetectable.  I was asked to admit her for treatment of acute on chronic pancreatitis.     Primary diagnoses    Abdominal pain with intractable nausea and vomiting  Suspect secondary to acute on chronic pancreatitis given atrophic pancreas findings and history of recent heavy alcohol use.  Normal lipase is not unexpected in this setting.  -N.p.o. except for medications for now  -Supportive cares with IV fluids, pain control, and antiemetics  -If pain and nausea better controlled, may start clears tomorrow morning and slowly advance as tolerated to a low-fat diet    Alcohol misuse disorder  Patient denies being admitted for alcohol withdrawal symptoms.  Currently no jazmín signs of acute delirium tremens and the patient is cooperative.  She is not scoring high enough to even receive Ativan  -We will place patient on the CIWA protocol however because she is at risk for withdrawal along with gabapentin.  Will not start patient on clonidine as patient is already on carvedilol and Norvasc and do not suspect patient will have room for clonidine    History of bipolar disorder  -May resume her chronic Remeron and Zyprexa  -Okay to have Ativan for some anxiety    GERD  -Continue Protonix    Recent diagnosis of UTI at outside facility (prior to admission diagnosis)  -May resume patient's nitrofurantoin      All lab work and imaging data independently reviewed by myself    Prophylaxis;  PCD/Ambulation.     Code status: Full code    Discharge: Back to usual prior living arrangement    Chief Complaint:   Epigastric abdominal pain/nausea/vomiting   HPI  Patient is a pleasant 50-year-old female with a history of bipolar disorder, hypertension, GERD, and hypothyroidism along with past medical history as outlined below who presents here with epigastric abdominal pain.  Patient does have a history of alcohol misuse disorder but denies any history of significant alcohol withdrawal nor  prior admission for withdrawal symptoms.  She does report drinking hard liquor on a daily basis and quite frequently over the past week.  She did stop the day before because patient had significant intractable nausea, vomiting, abdominal pain.  Vomiting was nonbloody in nature.  Patient actually presented to the ED yesterday and had a CT of the abdomen performed.  It demonstrated an atrophic pancreas with a pseudocyst which appears stable.  Otherwise, no other pathology to explain her abdominal pain, nausea, and vomiting.  Last bowel movement was about 2 days ago.  No sick contacts, unusual ingestion, or travel.  Patient reports that since release from the ED yesterday, patient continued to have lack of p.o. intake secondary to nausea and had not been able to keep anything down.  She has had greater than 14 episodes of nonbloody emesis and progressive abdominal pain which brought her in for assessment.  On work-up in the ED, a lipase was normal but not unexpected given her atrophic pancreas from suspected chronic recurrent pancreatitis secondary to alcohol abuse.  Chemistries were otherwise unremarkable clear.  Her AST yesterday was 131 and she did have a mildly elevated alk phos of 269.  White count was normal at 10.4.  Patient reports that she is currently being treated for urinary tract infection and is on a 7-day course of Macrobid.  Her alcohol level today is nondetectable.  I was asked to admit her for treatment of acute on chronic pancreatitis.  I did discuss the case in detail with the ED physician.     Past Medical History:     Past Medical History:   Diagnosis Date     Abnormal Pap smear of cervix 01/09/2019    see problem list     Anxiety      Bipolar disorder (H)      C. difficile colitis      Cervical high risk HPV (human papillomavirus) test positive 01/09/2019 & 2/19/2020    see problem list     Depressive disorder      Essential hypertension 7/8/2015     Gastro-oesophageal reflux disease      History  "of colposcopy 03/03/2020    see problem list     Hypothyroidism 4/1/2015     Migraine      Spontaneous pneumothorax     x3, resolved w/ pleurodesis      Suicide attempt (H)      Past Surgical History:     Past Surgical History:   Procedure Laterality Date     ARTHROSCOPY KNEE      L knee     CERVIX SURGERY      for pre-cancerous changes     left knee surgery       ORTHOPEDIC SURGERY      L shoulder     THORACIC SURGERY      for pneumothorax, pleurodesis and lobe resection     Social History:     Social History     Tobacco Use     Smoking status: Current Every Day Smoker     Packs/day: 0.50     Types: Cigarettes     Smokeless tobacco: Never Used   Substance Use Topics     Alcohol use: Yes     Alcohol/week: 2.0 standard drinks     Types: 2 Glasses of wine per week     Comment: 1 glass of wine per week     Drug use: Not Currently     Frequency: 0.5 times per week     Types: Marijuana     Family History:  Family history reviewed. NO pertinent family history     Allergies:     Allergies   Allergen Reactions     Azithromycin      Other reaction(s): Dermatitis     Cephalexin      PN: LW Reaction: Itching, Pruritis     Compazine [Prochlorperazine] Other (See Comments)     dystonia     Metoclopramide Other (See Comments)     \"I feel like I am crawling out of my skin\"     Minocycline Nausea and Vomiting     PN: DIARRHEA, VOMITING, AND STOMACH CRAMPS     Reglan [Metoclopramide Hcl] Other (See Comments)     Sensation of \"crawling out of skin\"     Restoril [Temazepam]      Thorazine [Chlorpromazine] Other (See Comments)     dystonia     Abilify [Aripiprazole] Rash     Lamotrigine Rash     Severe drug rash - contraindication to receiving again. Skin peeling.      Sulfa Drugs Rash     Medications:     Medications Prior to Admission   Medication Sig Dispense Refill Last Dose     amLODIPine (NORVASC) 5 MG tablet Take 1 tablet (5 mg) by mouth daily 90 tablet 3 11/28/2020 at am     atorvastatin (LIPITOR) 10 MG tablet TAKE 1 TABLET " EVERY DAY 90 tablet 3 11/28/2020 at am     carvedilol (COREG) 12.5 MG tablet Take 1 tablet (12.5 mg) by mouth 2 times daily (with meals) 180 tablet 3 11/28/2020 at 1 dose     clonazePAM (KLONOPIN) 1 MG tablet Take 1.5 mg by mouth every morning   11/28/2020 at am     clonazePAM (KLONOPIN) 2 MG tablet Take 2 mg by mouth At Bedtime   11/27/2020 at bedtime     cyclobenzaprine (FLEXERIL) 10 MG tablet TAKE 1 TABLET (10 MG) BY MOUTH AT BEDTIME 90 tablet 0 11/27/2020 at HS     eletriptan (RELPAX) 40 MG tablet Take 2 tablets (80 mg) by mouth at onset of headache for migraine May repeat in 2 hours. Max 2 tablets/24 hours.   Past Week at Unknown time     FIBER PO Take 1 capsule by mouth daily    11/28/2020 at am     fluticasone (FLONASE) 50 MCG/ACT nasal spray SHAKE LIQUID AND USE 1 SPRAY IN EACH NOSTRIL DAILY 48 g 1 Past Month at Unknown time     folic acid (FOLVITE) 400 MCG tablet Take 400 mcg by mouth daily   11/28/2020 at am     hydrOXYzine (VISTARIL) 50 MG capsule Take 1 capsule (50 mg) by mouth 2 times daily. May also take 1 capsule (50 mg) daily as needed for anxiety. 270 capsule 0 11/28/2020 at am     ibuprofen (ADVIL/MOTRIN) 400 MG tablet Take 1 tablet (400 mg) by mouth 3 times daily 30 tablet 1 Past Week at Unknown time     ketorolac (TORADOL) 30 MG/ML injection Inject 1 mL (30 mg) into the muscle every 6 hours as needed for moderate pain 5 mL 0 Past Month at Unknown time     levothyroxine (SYNTHROID/LEVOTHROID) 50 MCG tablet TAKE 1 TABLET EVERY DAY 90 tablet 3 11/28/2020 at am     mirtazapine (REMERON) 30 MG tablet Take 2 tablets (60 mg) by mouth At Bedtime 180 tablet 1 11/27/2020 at HS     Multiple Vitamins-Minerals (WOMENS MULTI VITAMIN & MINERAL PO) Take 1 tablet by mouth daily   11/28/2020 at am     nitroFURantoin macrocrystal (MACRODANTIN) 100 MG capsule Take 1 capsule (100 mg) by mouth 2 times daily 14 capsule 0 11/28/2020 at am     OLANZapine (ZYPREXA) 15 MG tablet Take 15 mg by mouth At Bedtime   11/27/2020  "at HS     OLANZapine (ZYPREXA) 5 MG tablet Take 5 mg by mouth every morning   11/28/2020 at am     ondansetron (ZOFRAN) 4 MG tablet Take 1 tablet (4 mg) by mouth every 8 hours as needed for nausea 30 tablet 0 11/27/2020 at Unknown time     pantoprazole (PROTONIX) 40 MG EC tablet Take 1 tablet (40 mg) by mouth every morning (before breakfast) 90 tablet 3 11/28/2020 at am     polyethylene glycol (MIRALAX) 17 g packet Take 1 packet by mouth daily as needed for constipation   11/28/2020 at am     sennosides (SENOKOT) 8.6 MG tablet Take 4 tablets by mouth daily   11/28/2020 at am     acetaminophen (TYLENOL) 500 MG tablet Take 1-2 tablets (500-1,000 mg) by mouth every 6 hours as needed for mild pain   More than a month at Unknown time     albuterol (PROAIR HFA/PROVENTIL HFA/VENTOLIN HFA) 108 (90 Base) MCG/ACT inhaler Inhale 2 puffs into the lungs every 6 hours as needed for shortness of breath / dyspnea or wheezing 1 Inhaler 0 More than a month at Unknown time     sodium chloride (OCEAN) 0.65 % nasal spray Use in both nostrils 3 times a day. 30 mL 1 More than a month at Unknown time     Syringe/Needle, Disp, (SYRINGE LUER LOCK) 25G X 1\" 3 ML MISC 1 Act once a week 10 each 0        Review of Systems:   A Comprehensive greater than 10 system review of systems was carried out.  Pertinent positives and negatives are noted above.  Otherwise negative for contributory information.        Physical Exam:  Blood pressure (!) 140/86, pulse 77, temperature 97.7  F (36.5  C), temperature source Temporal, resp. rate 16, SpO2 95 %.  Gen: Pleasant in no acute distress.  HEENT: NCAT. EOMI. PERRL.  Neck: Normal inspection. No bruit, JVD or thyromegaly.  Lungs: Normal respiratory effort. Clear to auscultation bilaterally with no crackles or wheezes.  Card: N s1s2. RRR. No M/R/G.  Peripheral pulses present and symmetric.   Abd: Soft epigastric tenderness to deep palpation but no rebound or guarding.  ND. No mass. Normal bowel sounds.  Skin: " No rash. Warm to the touch  Extr: No edema. CMS intact  Psychiatric: Patient alert oriented ×3.  Normal affect  Neurologic: Cranial nerves II-XII are intact.  Sensation normal.  Motor strength 5/5    Data:     Recent Labs   Lab 11/28/20  1352 11/27/20  1735   WBC 10.4 12.6*   HGB 14.4 14.4   HCT 43.9 43.1   * 101*   * 136*     Recent Labs   Lab 11/28/20  1922 11/28/20  1352 11/27/20  1735   NA  --  137 139   POTASSIUM 4.2 4.6 4.0   CHLORIDE  --  107 108   CO2  --  25 25   ANIONGAP  --  5 6   GLC  --  113* 82   BUN  --  8 8   CR  --  0.77 0.74   GFRESTIMATED  --  >90 >90   GFRESTBLACK  --  >90 >90   ISAURO  --  8.9 8.3*   MAG 2.2  --   --    PHOS 3.1  --   --    PROTTOTAL  --  8.1 7.9   ALBUMIN  --  3.7 3.6   BILITOTAL  --  1.5* 1.0   ALKPHOS  --  269* 272*   AST  --  Canceled, Test credited 131*   ALT  --  28 33     No results for input(s): INR in the last 168 hours.  Recent Labs   Lab 11/28/20  1352 11/27/20  1735   LIPASE 82 118       Imaging:   No results found for this or any previous visit (from the past 24 hour(s)).    Chance Capps MD Pager [phone]

## 2020-11-29 NOTE — PROGRESS NOTES
[facility]  Hospitalist Progress Note  Robert Sesay, DO    Assessment & Plan   Patient is a pleasant 50-year-old female with a history of bipolar disorder, hypertension, GERD, and hypothyroidism along with past medical history as outlined below who presents here with epigastric abdominal pain.  Patient does have a history of alcohol misuse disorder but denies any history of significant alcohol withdrawal nor prior admission for withdrawal symptoms.  She does report drinking hard liquor on a daily basis and quite frequently over the past week.  She did stop the day before because patient had significant intractable nausea, vomiting, abdominal pain.  Vomiting was nonbloody in nature.  Patient actually presented to the ED yesterday and had a CT of the abdomen performed.  It demonstrated an atrophic pancreas with a pseudocyst which appears stable.  Otherwise, no other pathology to explain her abdominal pain, nausea, and vomiting.  Last bowel movement was about 2 days ago.  No sick contacts, unusual ingestion, or travel.  Patient reports that since release from the ED yesterday, patient continued to have lack of p.o. intake secondary to nausea and had not been able to keep anything down.  She has had greater than 14 episodes of nonbloody emesis and progressive abdominal pain which brought her in for assessment.  On work-up in the ED, a lipase was normal but not unexpected given her atrophic pancreas from suspected chronic recurrent pancreatitis secondary to alcohol abuse.  Chemistries were otherwise unremarkable clear.  Her AST yesterday was 131 and she did have a mildly elevated alk phos of 269.  White count was normal at 10.4.  Patient reports that she is currently being treated for urinary tract infection and is on a 7-day course of Macrobid.  Her alcohol level today is nondetectable.  I was asked to admit her for treatment of acute on chronic pancreatitis.      Abdominal pain with intractable  nausea and vomiting  Pt with several epidodes of pancreatitis in the past, with last one year ago.  Suspect either gallbladder, gastritis, or esophageal spasms causing pain.  Pt states pain comes on then she vomits.  She states she vomits anytime she tries to eat something or drink something.  - Advance diet as tolerated  - Continue IVF  - Pain control, antiemetics  - Add PPI IV BID and famotidine 10 mg BID  - Check Abd US secondary to elevated alk phos  - If pain persists and still nauseous and vomiting tomorrw, will consult GI     Severe Alcohol Abuse disorder  Patient denies being admitted for alcohol withdrawal symptoms.  Currently no jazmín signs of acute delirium tremens and the patient is cooperative.  She is not scoring high enough to even receive Ativan  - Continue CIWA for now  - Ativan 0.5 mg q3h prn  - Consult Social Work     History of bipolar disorder  - Resume her chronic Remeron and Zyprexa  - Okay to have Ativan for some anxiety     GERD  - Continue Protonix     Recent diagnosis of UTI at outside facility (prior to admission diagnosis)  - On Day 8 of macrobid  - Recheck urinalysis as pt states she is having burning with urination still    PLAN:  Added protonix 40 mg IV BID and famotidine BID.  Will check abd US.  If negative and pt with persistent symptoms, will consult Gastroenterology.  Continue CIWA and pain control.     DVT Prophylaxis: Pneumatic Compression Devices  Code Status: Full Code  Discharge Plan: Expected discharge: 2 - 3 days, recommended to prior living arrangement once adequate pain management/ tolerating PO medications.      Robert Sesay, DO  [employer] (7am - 6 pm, M-F)    ------------------------------------------------------------------------------------------------------------------------------------------------------------------------------------------------------------------------------------------------------    Interval History   Patient was seen and examined. Pt crying  upon arrival.  States she is still having abdominal pain.  States she has not vomited since yesterday but is still nauseous.  States she has not had a BM in 3 days but also has not eaten anything.     Physical Exam   Temp: 98.8  F (37.1  C) Temp src: Temporal BP: (!) 153/77 Pulse: 84   Resp: 16 SpO2: 96 % O2 Device: None (Room air)    There were no vitals filed for this visit.  Vital Signs with Ranges  Temp:  [97.6  F (36.4  C)-98.8  F (37.1  C)] 98.8  F (37.1  C)  Pulse:  [76-84] 84  Resp:  [16] 16  BP: (123-157)/(77-99) 153/77  SpO2:  [95 %-99 %] 96 %  I/O last 3 completed shifts:  In: 900 [I.V.:900]  Out: -     Constitutional: Awake, alert, cooperative, no apparent distress  Respiratory: Clear to auscultation bilaterally, no crackles or wheezing  Cardiovascular: Regular rate and rhythm, normal S1 and S2, and no murmur noted  GI: Normal bowel sounds, soft, Mildly distended, non-tender  Skin/Integumen: No rashes, no cyanosis, no edema    Medications     sodium chloride 125 mL/hr at 11/29/20 0240       amLODIPine  5 mg Oral Daily     carvedilol  12.5 mg Oral BID w/meals     famotidine  10 mg Oral BID     folic acid  1 mg Oral Daily     [START ON 12/6/2020] gabapentin  100 mg Oral Q8H     [START ON 12/4/2020] gabapentin  300 mg Oral Q8H     [START ON 12/2/2020] gabapentin  600 mg Oral Q8H     gabapentin  900 mg Oral Q8H     gabapentin  1,200 mg Oral Once     levothyroxine  50 mcg Oral Daily     mirtazapine  60 mg Oral At Bedtime     multivitamin w/minerals  1 tablet Oral Daily     nitroFURantoin macrocrystal  100 mg Oral BID     OLANZapine  15 mg Oral At Bedtime     OLANZapine  5 mg Oral QAM     pantoprazole (PROTONIX) IV  40 mg Intravenous BID     sodium chloride (PF)  3 mL Intracatheter Q8H     thiamine  200 mg Oral TID    Followed by     [START ON 11/30/2020] thiamine  100 mg Oral TID    Followed by     [START ON 12/6/2020] thiamine  100 mg Oral Daily       Data   Recent Labs   Lab 11/29/20  0647 11/28/20  1922  11/28/20  1352 11/27/20  1735   WBC 6.8  --  10.4 12.6*   HGB 12.9  --  14.4 14.4   *  --  102* 101*   *  --  104* 136*     --  137 139   POTASSIUM 4.1 4.2 4.6 4.0   CHLORIDE 112*  --  107 108   CO2 22  --  25 25   BUN 7  --  8 8   CR 0.68  --  0.77 0.74   ANIONGAP 8  --  5 6   ISAURO 8.3*  --  8.9 8.3*   GLC 76  --  113* 82   ALBUMIN 3.2*  --  3.7 3.6   PROTTOTAL 6.7*  --  8.1 7.9   BILITOTAL 1.0  --  1.5* 1.0   ALKPHOS 226*  --  269* 272*   ALT 23  --  28 33   AST 88*  --  Canceled, Test credited 131*   LIPASE 87  --  82 118       No results found for this or any previous visit (from the past 24 hour(s)).

## 2020-11-29 NOTE — DISCHARGE SUMMARY
Admit Date:     11/28/2020   Discharge Date:     11/29/2020      DISCHARGE DIAGNOSES:   1.  Acute abdominal pain with intractable nausea and vomiting -- likely gastritis versus acute on chronic pancreatitis versus esophageal spasms.   2.  Severe alcohol abuse disorder.   3.  History of bipolar disorder.   4.  Gastroesophageal reflux disease.   5.  Recent diagnosis of urinary tract infection.      HISTORY OF PRESENT ILLNESS:  The patient is a 50-year-old female with past medical history of bipolar disorder, hypertension, GERD, hypothyroidism who presents with chief complaint of epigastric abdominal pain.  The patient does have a history of alcohol abuse disorder; however, denied any history of alcohol withdrawal or any alcohol withdrawal seizures.  She does report drinking hard liquor on a daily basis and quite frequently over the past week.  She stated that she drinks 7 to 8 glasses of vodka per day and has for quite some time now.  She states her last drink was approximately 3 days prior to admission.  She states at that time she developed some abdominal pain located in the epigastric region with diffuse nausea and vomiting.  She described the vomit as nonbloody.  She denied any fevers or chills.      HOSPITAL COURSE:  The patient was seen and examined.  The patient did have a CT scan of her abdomen the day prior to admission as the patient did come to the Emergency Department that day.  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  She did report that her last bowel movement was 2 days prior to admission; however, also reports that she has not been eating or drinking much as she vomits every time she eats something.  On 11/29/2020, the patient was seen and stated that she had not vomited since she arrived in the Emergency Department.  The patient did report that she was still having some abdominal pain.  An ultrasound of the patient's abdomen was obtained secondary to an elevated  alkaline phosphatase, which did return negative for any acute biliary issues.  The patient was started on IV fluid hydration.  The patient's abdominal pain was controlled.  A urinalysis was obtained as the patient was on Macrobid for a urinary tract infection that was diagnosed as an outpatient.  The urinalysis did return negative for any acute infection.  On 11/29/2020, the patient requested to be discharged as she stated that she was unable to afford another day in the hospital.  On 11/29/2020 the patient was discharged home.      DISCHARGE INSTRUCTIONS:  The patient was instructed to return to the Emergency Department if her symptoms do not improve or worsened.  The patient was instructed to follow up with her primary care doctor as soon as possible regarding her recent hospitalization.  The patient was instructed that she should take her Protonix 40 mg twice daily as well as Pepcid 10 mg twice daily until she sees her primary care doctor.  The patient expressed understanding and was in agreement with this treatment plan.  The patient was discharged to home.      DISCHARGE MEDICATIONS:       Ana Laura Wharton   Home Medication Instructions SAUL:[id number]    Printed on:11/30/20 1866   Medication Information                      acetaminophen (TYLENOL) 500 MG tablet  Take 1-2 tablets (500-1,000 mg) by mouth every 6 hours as needed for mild pain             albuterol (PROAIR HFA/PROVENTIL HFA/VENTOLIN HFA) 108 (90 Base) MCG/ACT inhaler  Inhale 2 puffs into the lungs every 6 hours as needed for shortness of breath / dyspnea or wheezing             amLODIPine (NORVASC) 5 MG tablet  Take 1 tablet (5 mg) by mouth daily             atorvastatin (LIPITOR) 10 MG tablet  TAKE 1 TABLET EVERY DAY             carvedilol (COREG) 12.5 MG tablet  Take 1 tablet (12.5 mg) by mouth 2 times daily (with meals)             clonazePAM (KLONOPIN) 1 MG tablet  Take 1.5 mg by mouth every morning             clonazePAM (KLONOPIN) 2 MG  "tablet  Take 2 mg by mouth At Bedtime             cyclobenzaprine (FLEXERIL) 10 MG tablet  TAKE 1 TABLET (10 MG) BY MOUTH AT BEDTIME             eletriptan (RELPAX) 40 MG tablet  Take 2 tablets (80 mg) by mouth at onset of headache for migraine May repeat in 2 hours. Max 2 tablets/24 hours.             famotidine (PEPCID) 10 MG tablet  Take 1 tablet (10 mg) by mouth 2 times daily             FIBER PO  Take 1 capsule by mouth daily              fluticasone (FLONASE) 50 MCG/ACT nasal spray  SHAKE LIQUID AND USE 1 SPRAY IN EACH NOSTRIL DAILY             folic acid (FOLVITE) 400 MCG tablet  Take 400 mcg by mouth daily             hydrOXYzine (VISTARIL) 50 MG capsule  Take 1 capsule (50 mg) by mouth 2 times daily. May also take 1 capsule (50 mg) daily as needed for anxiety.             levothyroxine (SYNTHROID/LEVOTHROID) 50 MCG tablet  TAKE 1 TABLET EVERY DAY             mirtazapine (REMERON) 30 MG tablet  Take 2 tablets (60 mg) by mouth At Bedtime             Multiple Vitamins-Minerals (WOMENS MULTI VITAMIN & MINERAL PO)  Take 1 tablet by mouth daily             nitroFURantoin macrocrystal (MACRODANTIN) 100 MG capsule  Take 1 capsule (100 mg) by mouth 2 times daily             OLANZapine (ZYPREXA) 15 MG tablet  Take 15 mg by mouth At Bedtime             OLANZapine (ZYPREXA) 5 MG tablet  Take 5 mg by mouth every morning             ondansetron (ZOFRAN) 4 MG tablet  Take 1 tablet (4 mg) by mouth every 8 hours as needed for nausea             pantoprazole (PROTONIX) 40 MG EC tablet  Take 1 tablet (40 mg) by mouth 2 times daily             polyethylene glycol (MIRALAX) 17 g packet  Take 1 packet by mouth daily as needed for constipation             sennosides (SENOKOT) 8.6 MG tablet  Take 4 tablets by mouth daily             sodium chloride (OCEAN) 0.65 % nasal spray  Use in both nostrils 3 times a day.             Syringe/Needle, Disp, (SYRINGE LUER LOCK) 25G X 1\" 3 ML MISC  1 Act once a week                  TOTAL " DISCHARGE TIME:  31 minutes.         KATERINA MERCADO DO             D: 2020   T: 2020   MT: CHAS      Name:     KEE TINEO   MRN:      [id number]        Account:        [id number]   :      1970           Admit Date:     2020                                  Discharge Date: 2020      Document: [id number]       cc: Liborio Lincoln MD

## 2020-11-29 NOTE — PLAN OF CARE
Pt discharged home, new medications orders sent to the pharmacy of her choice, belongings returned. Pt had no questions or concerns noted upon discharge.

## 2020-11-30 ENCOUNTER — TELEPHONE (OUTPATIENT)
Dept: INTERNAL MEDICINE | Facility: CLINIC | Age: 50
End: 2020-11-30

## 2020-11-30 NOTE — TELEPHONE ENCOUNTER
Patient calls asking if she is ok to use the highest Nicotene patch on market to quit smoking with her heat history.  Please advise.

## 2020-12-03 ENCOUNTER — TELEPHONE (OUTPATIENT)
Dept: INTERNAL MEDICINE | Facility: CLINIC | Age: 50
End: 2020-12-03

## 2020-12-03 ENCOUNTER — MYC MEDICAL ADVICE (OUTPATIENT)
Dept: INTERNAL MEDICINE | Facility: CLINIC | Age: 50
End: 2020-12-03

## 2020-12-03 DIAGNOSIS — R30.0 DYSURIA: Primary | ICD-10-CM

## 2020-12-03 DIAGNOSIS — I10 BENIGN ESSENTIAL HYPERTENSION: ICD-10-CM

## 2020-12-03 NOTE — TELEPHONE ENCOUNTER
Patient calling  She has chronic UTI's and she wants a lab order  Please advise.  Ok to call and dontrell [phone]

## 2020-12-03 NOTE — TELEPHONE ENCOUNTER
Call to pt. She is up all night going to the bathroom. Feels like has to urinate all the time. Burns with urination. Foul odor.     Please advise

## 2020-12-04 ENCOUNTER — HOSPITAL ENCOUNTER (OUTPATIENT)
Dept: LAB | Facility: CLINIC | Age: 50
Discharge: HOME | End: 2020-12-04
Attending: INTERNAL MEDICINE | Admitting: INTERNAL MEDICINE
Payer: COMMERCIAL

## 2020-12-04 DIAGNOSIS — R30.0 DYSURIA: ICD-10-CM

## 2020-12-04 LAB
ALBUMIN UR-MCNC: NEGATIVE MG/DL
APPEARANCE UR: CLEAR
BILIRUB UR QL STRIP: NEGATIVE
COLOR UR AUTO: YELLOW
GLUCOSE UR STRIP-MCNC: NEGATIVE MG/DL
HGB UR QL STRIP: NEGATIVE
HYALINE CASTS #/AREA URNS LPF: 1 /LPF (ref 0–2)
KETONES UR STRIP-MCNC: NEGATIVE MG/DL
LEUKOCYTE ESTERASE UR QL STRIP: NEGATIVE
MUCOUS THREADS #/AREA URNS LPF: PRESENT /LPF
NITRATE UR QL: NEGATIVE
PH UR STRIP: 6.5 PH (ref 5–7)
RBC #/AREA URNS AUTO: <1 /HPF (ref 0–2)
SOURCE: ABNORMAL
SP GR UR STRIP: 1.01 (ref 1–1.03)
SQUAMOUS #/AREA URNS AUTO: 2 /HPF (ref 0–1)
UROBILINOGEN UR STRIP-MCNC: NORMAL MG/DL (ref 0–2)
WBC #/AREA URNS AUTO: 1 /HPF (ref 0–5)

## 2020-12-04 PROCEDURE — 81001 URINALYSIS AUTO W/SCOPE: CPT | Performed by: INTERNAL MEDICINE

## 2020-12-04 RX ORDER — CARVEDILOL 12.5 MG/1
TABLET ORAL
Qty: 180 TABLET | Refills: 3 | Status: SHIPPED | OUTPATIENT
Start: 2020-12-04 | End: 2021-01-18

## 2020-12-04 NOTE — TELEPHONE ENCOUNTER
See mychart message.  Urine test was negative.   Please advise. Should she schedule Evisit or VV if still having symptoms? She doesn't want to see Urology.

## 2020-12-07 ENCOUNTER — TELEPHONE (OUTPATIENT)
Dept: PSYCHIATRY | Facility: CLINIC | Age: 50
End: 2020-12-07

## 2020-12-07 ENCOUNTER — TRANSFERRED RECORDS (OUTPATIENT)
Dept: HEALTH INFORMATION MANAGEMENT | Facility: CLINIC | Age: 50
End: 2020-12-07

## 2020-12-07 DIAGNOSIS — E78.5 HYPERLIPIDEMIA LDL GOAL <130: ICD-10-CM

## 2020-12-07 DIAGNOSIS — E03.9 ACQUIRED HYPOTHYROIDISM: ICD-10-CM

## 2020-12-07 DIAGNOSIS — F31.9 BIPOLAR I DISORDER (H): ICD-10-CM

## 2020-12-07 DIAGNOSIS — G43.919 INTRACTABLE MIGRAINE WITHOUT STATUS MIGRAINOSUS, UNSPECIFIED MIGRAINE TYPE: ICD-10-CM

## 2020-12-07 RX ORDER — LEVOTHYROXINE SODIUM 50 UG/1
TABLET ORAL
Qty: 90 TABLET | Refills: 3 | Status: CANCELLED | OUTPATIENT
Start: 2020-12-07

## 2020-12-07 NOTE — TELEPHONE ENCOUNTER
Patient has [employer] and will be changing insurance the first of the year in order to stay with PCP because [employer] will no longer cover [employer].  Patient is requesting 90 day refills of all 4 medications requested in order to avoid any problems during the change over with Health insurance.

## 2020-12-07 NOTE — TELEPHONE ENCOUNTER
[facility] Call Center    Phone Message    May a detailed message be left on voicemail: yes     Reason for Call: Medication Refill Request    Has the patient contacted the pharmacy for the refill? Yes   Name of medication being requested: Clonzepam  Provider who prescribed the medication: Michael  Pharmacy:      [facility] - [facility] [address] [facility]    Date medication is needed: Today    Pt stated that her refill request was denied and she doesn't know why. She would like a call to discuss it.     Action Taken: Other: Sent to [facility]    Travel Screening: Not Applicable

## 2020-12-08 RX ORDER — LEVOTHYROXINE SODIUM 50 UG/1
TABLET ORAL
Qty: 90 TABLET | Refills: 3 | OUTPATIENT
Start: 2020-12-08

## 2020-12-08 RX ORDER — LEVOTHYROXINE SODIUM 50 UG/1
TABLET ORAL
Qty: 90 TABLET | Refills: 3 | Status: SHIPPED | OUTPATIENT
Start: 2020-12-08 | End: 2021-01-18

## 2020-12-08 RX ORDER — ATORVASTATIN CALCIUM 10 MG/1
TABLET, FILM COATED ORAL
Qty: 90 TABLET | Refills: 3 | Status: SHIPPED | OUTPATIENT
Start: 2020-12-08 | End: 2021-01-18

## 2020-12-08 RX ORDER — ATORVASTATIN CALCIUM 10 MG/1
TABLET, FILM COATED ORAL
Qty: 90 TABLET | Refills: 3 | OUTPATIENT
Start: 2020-12-08

## 2020-12-08 RX ORDER — CYCLOBENZAPRINE HCL 10 MG
10 TABLET ORAL AT BEDTIME
Qty: 90 TABLET | Refills: 0 | Status: SHIPPED | OUTPATIENT
Start: 2020-12-08 | End: 2020-12-24

## 2020-12-08 RX ORDER — CYCLOBENZAPRINE HCL 10 MG
10 TABLET ORAL AT BEDTIME
Qty: 90 TABLET | Refills: 0 | OUTPATIENT
Start: 2020-12-08

## 2020-12-08 NOTE — TELEPHONE ENCOUNTER
Canceled the prescription from [employer] and resent the prescriptions to the requested pharmacy.      Myc message sent to patient

## 2020-12-08 NOTE — TELEPHONE ENCOUNTER
See 11/28/20 MyChart encounter for resolution.    Writer left voice mail message for pt, identifying that the refills were sent and Dr. Rodriguez's request about scheduling.  No PHI was provided as there was no identification on the voice mail greeting.

## 2020-12-14 ENCOUNTER — TRANSFERRED RECORDS (OUTPATIENT)
Dept: HEALTH INFORMATION MANAGEMENT | Facility: CLINIC | Age: 50
End: 2020-12-14

## 2020-12-17 ENCOUNTER — NURSE TRIAGE (OUTPATIENT)
Dept: INTERNAL MEDICINE | Facility: CLINIC | Age: 50
End: 2020-12-17

## 2020-12-17 ENCOUNTER — NURSE TRIAGE (OUTPATIENT)
Dept: NURSING | Facility: CLINIC | Age: 50
End: 2020-12-17

## 2020-12-17 NOTE — TELEPHONE ENCOUNTER
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 She only uses rubber gloves when she is working with Bleach. She does NOT wear gloves when cleaning with a chemical called KABOOM.  She said it is a spray  for the sinks and tubs, etc.     B-(background): She said she has noticed some irritation and redness in both hands for the past 2 months. She last cleaned yesterday and today she noticed a blister about the size of a dime on her Right pinky finger    A-(assessment): rash of unknown etiology, possibly contact dermatits/ chemical burns from using .     R-(recommendations):  She was instructed to wear gloves when using the spray  as well as the mist gets on her hands. She should also wash her hands immediately after cleaning and taking off her gloves. Then use a good moisturizer so her hands won't  dry out. She has Triamcinolone cream. She could try that on her hands,  to see if gives relief. If she develops more little blisters or if skin starts to peel off in sheets she needs to be seen in ER. She was transferred to appt desk as she wants to schedule appt with her PCP.....................KISHA Tran        Additional Information    Negative: Difficulty breathing    Negative: Shock suspected (e.g., cold/pale/clammy skin, too weak to stand, low BP, rapid pulse)    Negative: Difficult to awaken or acting confused (e.g., disoriented, slurred speech)    Negative: [1] Burn area larger than 10 palms of hand (> 10% BSA) AND [2] blisters    Negative: Sounds like a life-threatening emergency to the triager    Negative: SEVERE pain (e.g., excruciating)    Negative: [1] Chemical has been washed off > 30 minutes ago AND [2] still painful    Negative: Chemical known to be hydrofluoric acid    Negative: Burn completely circles an arm or leg    Negative: Size > 2% of body surface area    Negative: Center of burn is white (or charred)    Negative: Sounds like a serious  "injury to the triager    Negative: Triager unable to answer question    [1] Chemical on skin AND [2] only caused redness    Answer Assessment - Initial Assessment Questions  1. ONSET: \"When did it happen?\" If happened < 10 minutes ago, ask: \"Did you wash off the chemical?\" If not, give First Aid Advice immediately.        For the past 2 months has had a fiery redness on the palms of her hands. Started on the right hand, then left , now both. My fingers are now red and peeling.   2. 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 Does wear gloves when she uses bleach,etc. Uses Chlorax wipes a lot without her glove on with her Right hand.   3. LOCATION: \"Where is the burn located?\"       More concerned about chemical burns;.   4. BURN SIZE: \"How large is the burn?\"  The palm is roughly 1% of the total body surface area (BSA).     The are most red outer of palms and her pinky fingers and all the way to her wrists.   5. SEVERITY OF THE BURN: \"Is there redness?\", \"Are there any blisters?\"      Ring finger on R hand has one blister, size of a dime.just noticed blister prior to calling. The last day she cleaned was yesterday.   6. MECHANISM: \"Tell me how it happened.\"      She cleans with  Chlorax wipes, Kaboom, ( spray cleanser for tubs) 'she is wearing rubber bgloves.   7. PAIN: \"Are you having any pain?\" \"How bad is the pain?\" (Scale 1-10; or mild, moderate, severe)    - MILD (1-3): doesn't interfere with normal activities     - MODERATE (4-7): interferes with normal activities or awakens from sleep     - SEVERE (8-10): excruciating pain, unable to do any normal activities       NOT painful, just itchy, but the blister on her pinky finger was sore until it popped on its feeling better.  8. OTHER SYMPTOMS: \"Do you have any other symptoms?\"      NO other symptoms.   9. PREGNANCY: \"Is there any chance you are pregnant?\" \"When was your last menstrual period?\"      Not " pg.    Protocols used: BURNS - CHEMICAL-A-AH

## 2020-12-18 ENCOUNTER — OFFICE VISIT (OUTPATIENT)
Dept: INTERNAL MEDICINE | Facility: CLINIC | Age: 50
End: 2020-12-18
Payer: COMMERCIAL

## 2020-12-18 ENCOUNTER — TELEPHONE (OUTPATIENT)
Dept: PSYCHIATRY | Facility: CLINIC | Age: 50
End: 2020-12-18

## 2020-12-18 VITALS
TEMPERATURE: 98.1 F | BODY MASS INDEX: 21.14 KG/M2 | SYSTOLIC BLOOD PRESSURE: 122 MMHG | DIASTOLIC BLOOD PRESSURE: 62 MMHG | WEIGHT: 131 LBS | HEART RATE: 77 BPM | OXYGEN SATURATION: 96 %

## 2020-12-18 DIAGNOSIS — R23.8 SKIN IRRITATION DUE TO TOPICAL AGENT: Primary | ICD-10-CM

## 2020-12-18 DIAGNOSIS — L30.9 ECZEMA, UNSPECIFIED TYPE: ICD-10-CM

## 2020-12-18 DIAGNOSIS — T49.95XA SKIN IRRITATION DUE TO TOPICAL AGENT: Primary | ICD-10-CM

## 2020-12-18 PROCEDURE — 99213 OFFICE O/P EST LOW 20 MIN: CPT | Performed by: PHYSICIAN ASSISTANT

## 2020-12-18 NOTE — TELEPHONE ENCOUNTER
"Patient says she needs an \"emergency visit\" w/ Dr. Lincoln.  Patient reports rash on right palm and fingers for 2 months, spread to left palm last week.  Patient report skin has blistered and peeled off.  Patient report pain level of 7/10.  Reviewed care advice with caller per RN triage protocol guideline to be evaluated w/i 24 hrs by PCP or another provider.  Caller verbalized understanding and agrees with plan.          Additional Information    Negative: Difficulty breathing    Negative: Shock suspected (e.g., cold/pale/clammy skin, too weak to stand, low BP, rapid pulse)    Negative: Difficult to awaken or acting confused (e.g., disoriented, slurred speech)    Negative: [1] Burn area larger than 10 palms of hand (> 10% BSA) AND [2] blisters    Negative: Sounds like a life-threatening emergency to the triager    Negative: SEVERE pain (e.g., excruciating)    Negative: [1] Chemical has been washed off > 30 minutes ago AND [2] still painful    Negative: Chemical known to be hydrofluoric acid    Negative: Burn completely circles an arm or leg    Negative: Poison ivy, oak, or sumac contact suspected    Negative: Insect bite(s) suspected    Negative: Ringworm suspected (i.e., round pink patch, sometimes looks like ring, usually 1/2 to 1 inch [12-25 mm],  in size, slowly increasing in size)    Negative: Athlete's Foot suspected (i.e., itchy rash between the toes)    Negative: Jock Itch suspected (i.e., itchy rash on inner thighs near genital area)    Negative: Wound infection suspected (i.e., pain, spreading redness, or pus; in a cut, puncture, scrape or sutured wound)    Negative: Impetigo suspected  (i.e., painless infected superficial small sores, less than 1 inch or 2.5 cm, often covered by a soft, yellow-brown scab or crust; sometimes occurring near nasal openings)    Negative: Shingles suspected (i.e., painful rash, multiple small blisters grouped together in one area of body; dermatomal distribution)    Negative: " Rash of external female genital area (vulva)    Negative: Rash of penis or scrotum    Negative: Small spot, skin growth, or mole    Negative: Sores or skin ulcer, not a rash    Negative: Localized lump (or swelling) without redness or rash    Negative: [1] Localized purple or blood-colored spots or dots AND [2] not from injury or friction AND [3] fever    Negative: Patient sounds very sick or weak to the triager    Negative: [1] Red area or streak AND [2] fever    Negative: [1] Rash is painful to touch AND [2] fever    Negative: [1] Looks infected (spreading redness, pus) AND [2] large red area (> 2 in. or 5 cm)    Negative: [1] Looks infected (spreading redness, pus) AND [2] diabetes mellitus or weak immune system (e.g., HIV positive, cancer chemo, splenectomy, organ transplant, chronic steroids)    Negative: [1] Localized purple or blood-colored spots or dots AND [2] not from injury or friction AND [3] no fever    Negative: [1] Looks infected (spreading redness, pus) AND [2] no fever    Looks like a boil, infected sore, deep ulcer or other infected rash    Protocols used: BURNS - CHEMICAL-A-AH, RASH OR REDNESS - [id number]-OF

## 2020-12-18 NOTE — TELEPHONE ENCOUNTER
Jaden Rodriguez MD Snyder, David J, RN             Please call and schedule Ana Laura for 1:30 today.     Thanks        Writer left voice mail message for pt, requesting callback to confirm whether or not the suggested time would work for her.  No PHI was provided as there was no identification on the voice mail greeting.

## 2020-12-18 NOTE — TELEPHONE ENCOUNTER
[facility] Call [facility]    Phone Message    May a detailed message be left on voicemail: yes     Reason for Call: Other: Pt called and stated that she does not need the appt today, instead she will just keep the one on 12/21 at 1pm.     Action Taken: Other: Sent to Sly [facility]    Travel Screening: Not Applicable

## 2020-12-18 NOTE — TELEPHONE ENCOUNTER
Jaden Rodriguez MD Snyder, David J, RN   Caller: Unspecified (Today,  8:43 AM)             I can see her Monday, 12/21 at 1:00 pm      Writer left voice mail message for pt, requesting callback to confirm whether or not the suggested time would work for her.  No PHI was provided as there was no identification on the voice mail greeting.

## 2020-12-18 NOTE — PROGRESS NOTES
Subjective     Ana Laura Wharton is a 50 year old female who presents to clinic today for the following health issues:    HPI         Rash  Onset/Duration: months  Description  Location: left hand rash then spread to right hand in the last few weeks  Character: painful, draining, blistering, red  Itching: no  Intensity:  moderate  Progression of Symptoms:  worsening  Accompanying signs and symptoms:   Fever: no  Body aches or joint pain: no  Sore throat symptoms: no  Recent cold symptoms: no  History:           Previous episodes of similar rash: None  New exposures:  Cleaning wipes see Epic triage note  Also using wipes at home without gloves.  Dishwashing with TOREY soap no gloves.   Recent travel: no  Exposure to similar rash: no  Precipitating or alleviating factors: none  Therapies tried and outcome: none        Review of Systems   Constitutional, HEENT, cardiovascular, pulmonary, gi and gu systems are negative, except as otherwise noted.      Objective    /62   Pulse 77   Temp 98.1  F (36.7  C) (Oral)   Wt 59.4 kg (131 lb)   SpO2 96%   BMI 21.14 kg/m    Body mass index is 21.14 kg/m .  Physical Exam   GENERAL: healthy, alert and no distress  RESP: lungs clear to auscultation - no rales, rhonchi or wheezes  CV: regular rates and rhythm and normal S1 S2, no S3 or S4  SKIN: no other areas of rash noted  Bilateral hand only   With redness generalized throughout the whole hand , and few scattered papules at the sides of the fingers consistent with dyshidrotic eczema   Small open area on right hand dorsal ring finger - skin peeled           Assessment & Plan     Skin irritation due to topical agent    - betamethasone valerate (VALISONE) 0.1 % external ointment; Apply topically 2 times daily    Eczema, unspecified type         Tobacco Cessation:   reports that she has been smoking cigarettes. She has been smoking about 0.50 packs per day. She has never used smokeless tobacco.  Tobacco Cessation Action Plan:  per PCP         Patient Instructions   Use skin moisturizers such as Cetaphil, Eucerin and Lubriderm.    Avoid excessive soap and water which may dry the skin.   Reviewed sensitive skin cares, avoiding perfumed skin products, detergents, and soaps.     Dove soap is better for eczema and irritated skin.     Bacitracin to the open area on the finger. Light bandaid to the area. If wet remove bandaid.     Use gloves for any cleaning - dishwashing or other cleaning with Lysol wipes or any other cleansers.         Return in about 2 weeks (around 1/1/2021) for recheck if not improving, regular primary provider.    Katheryn Rosenthal PA-C  [facility]

## 2020-12-18 NOTE — TELEPHONE ENCOUNTER
[facility] Call Center    Phone Message    May a detailed message be left on voicemail: yes     Reason for Call: Other: Pt called this morning and stated that she had burned her hand really bad and was headed into the emergency department. Pt stated that she was not going to make it to her 1pm appointment time today. Pt stated that she would like to have an appt with Dr. Rodriguez before the end of the year. She woud like a call from either Sly or Dr. Rodriguez. Can be reached at number provided.      Action Taken: Other: Sent to Sly BE[facility]    Travel Screening: Not Applicable

## 2020-12-18 NOTE — PATIENT INSTRUCTIONS
Use skin moisturizers such as Cetaphil, Eucerin and Lubriderm.    Avoid excessive soap and water which may dry the skin.   Reviewed sensitive skin cares, avoiding perfumed skin products, detergents, and soaps.     Dove soap is better for eczema and irritated skin.     Bacitracin to the open area on the finger. Light bandaid to the area. If wet remove bandaid.     Use gloves for any cleaning - dishwashing or other cleaning with Lysol wipes or any other cleansers.

## 2020-12-18 NOTE — TELEPHONE ENCOUNTER
Patient called to confirm Monday 12/21 at 1:00 PM will work for a telephone visit.  The appointment has been scheduled.    Message routed to ORA Duarte

## 2020-12-21 ENCOUNTER — VIRTUAL VISIT (OUTPATIENT)
Dept: PSYCHIATRY | Facility: CLINIC | Age: 50
End: 2020-12-21
Attending: PSYCHIATRY & NEUROLOGY
Payer: COMMERCIAL

## 2020-12-21 RX ORDER — MIRTAZAPINE 30 MG/1
60 TABLET, FILM COATED ORAL AT BEDTIME
Qty: 180 TABLET | Refills: 1 | Status: CANCELLED | OUTPATIENT
Start: 2020-12-21

## 2020-12-21 RX ORDER — CLONAZEPAM 1 MG/1
TABLET ORAL
Qty: 120 TABLET | Refills: 1 | Status: CANCELLED | OUTPATIENT
Start: 2020-12-21

## 2020-12-21 RX ORDER — HYDROXYZINE PAMOATE 50 MG/1
CAPSULE ORAL
Qty: 90 CAPSULE | Refills: 1 | Status: CANCELLED | OUTPATIENT
Start: 2020-12-21

## 2020-12-23 ENCOUNTER — TELEPHONE (OUTPATIENT)
Dept: PSYCHIATRY | Facility: CLINIC | Age: 50
End: 2020-12-23

## 2020-12-23 NOTE — TELEPHONE ENCOUNTER
Writer received incoming phone call from Katrin, pharmacist with [facility] pharmacy. She informed this writer that pt's Klonopin 1 mg tablet was mailed out by the pharmacy 14 days ago. Unfortunately, due to the holidays, the pt's local pharmacy just received the package this afternoon. It's unclear when it will actually be delivered to the pt. Katrin is asking that a small supply is sent to pt's local [facility] to help with coverage until the mailed supply is delivered. Agreed to discuss this with the provider and then f/up with the pt directly.     Per [place] , clonazepam was last filled on 12/9/20 (not yet delivered), 11/3/20 ([facility]), [facility] all for 30 d/s.    Called pt to confirm if she has any tabs remaining as she received two 30 d/s in November. Pt informed this writer that she actually received the medication this morning and does not need a refill sent to her local pharmacy. No further action needed.

## 2020-12-24 DIAGNOSIS — F31.9 BIPOLAR I DISORDER (H): ICD-10-CM

## 2020-12-24 RX ORDER — CYCLOBENZAPRINE HCL 10 MG
10 TABLET ORAL AT BEDTIME
Qty: 90 TABLET | Refills: 0 | Status: SHIPPED | OUTPATIENT
Start: 2020-12-24 | End: 2021-01-18

## 2020-12-24 NOTE — TELEPHONE ENCOUNTER
Reason for Call:  Other prescription    Detailed comments: please refill per patient call this morning.     Phone Number Patient can be reached at: [phone]    Best Time: any    Can we leave a detailed message on this number? YES    Call taken on 12/24/2020 at 8:32 AM by Wesly Jo

## 2020-12-24 NOTE — TELEPHONE ENCOUNTER
Flexeril      Last Written Prescription Date:  12/8/20  Last Fill Quantity: 90,   # refills: 0  Last Office Visit: 10/6/20  Future Office visit:    Next 5 appointments (look out 90 days)    Dec 31, 2020 11:00 AM  Office Visit with William Montes MD  [facility] ([facility] [address] [address]  [phone]   Jan 18, 2021  9:00 AM  PHYSICAL with Liborio Lincoln MD  [facility] ([facility]) [address] [place] [address]  [phone]   Mar 04, 2021  9:30 AM  SHORT with Celso Mcwilliams MD  [facility] ([facility]) [address]  [address]  [facility] [address]  [phone]           Routing refill request to provider for review/approval because:  Drug not on the [facility], [facility] or [facility] refill protocol or controlled substance

## 2020-12-28 NOTE — TELEPHONE ENCOUNTER
Jaden Rodriguez MD Valena, Victoria RN   Caller: Unspecified (1 week ago)             Today        Follow up:  - called patient to let her know that Dr. Rodriguez would like to meet with her today at 1 pm. She agreed to make herself available for a telephone appointment today at 1 pm.    - message sent to scheduling to get patient on the  schedulee.

## 2020-12-28 NOTE — TELEPHONE ENCOUNTER
Jaden Rodriguez MD Snyder, David J RN   Caller: Unspecified (1 week ago)             Please reschedule her for 1:00 same day        Follow up:  - called patient to see if she was expecting to meet with Dr. Rodriguez today. She said that she had just recently had an appointment and does have a follow up scheduled on Jan 22 at 1:30 pm. Will confirm with Dr. Rodriguez whether this appointment is what needs to be scheduled at 1 pm.    - patient will have new insurance next month and will mail a photocopy of the front and back to the clinic when she has them available.

## 2020-12-31 ENCOUNTER — OFFICE VISIT (OUTPATIENT)
Dept: INTERNAL MEDICINE | Facility: CLINIC | Age: 50
End: 2020-12-31
Payer: COMMERCIAL

## 2020-12-31 VITALS
TEMPERATURE: 98.3 F | DIASTOLIC BLOOD PRESSURE: 68 MMHG | WEIGHT: 126.4 LBS | SYSTOLIC BLOOD PRESSURE: 106 MMHG | HEART RATE: 79 BPM | BODY MASS INDEX: 20.4 KG/M2 | OXYGEN SATURATION: 96 %

## 2020-12-31 DIAGNOSIS — R21 RASH: Primary | ICD-10-CM

## 2020-12-31 PROCEDURE — 99213 OFFICE O/P EST LOW 20 MIN: CPT | Performed by: INTERNAL MEDICINE

## 2020-12-31 RX ORDER — TRIAMCINOLONE ACETONIDE 1 MG/G
OINTMENT TOPICAL PRN
COMMUNITY
End: 2021-03-01

## 2020-12-31 RX ORDER — KETOROLAC TROMETHAMINE 30 MG/ML
30 INJECTION, SOLUTION INTRAMUSCULAR; INTRAVENOUS PRN
Status: ON HOLD | COMMUNITY
Start: 2020-09-08 | End: 2022-01-01

## 2020-12-31 RX ORDER — CLINDAMYCIN PHOSPHATE 10 UG/ML
LOTION TOPICAL DAILY
COMMUNITY
End: 2021-01-18

## 2020-12-31 NOTE — PROGRESS NOTES
Subjective     Ana Laura Wharton is a 50 year old female who presents to clinic today for the following health issues:    HPI       Patient normally seen at [facility] clniic      Derm concern       Duration: Recheck from 12/18/20, skin irritation due to topical agent     Description (location/character/radiation): Bilateral hand. Redness     Intensity:  moderate    Accompanying signs and symptoms: none     Therapies tried and outcome: betamethasone valerate (VALISONE) 0.1 % external ointment- patient has noticed some improvement, still having redness on hands.         Review of Systems   CONSTITUTIONAL: NEGATIVE for fever, chills, change in weight  ENT/MOUTH: NEGATIVE for ear, mouth and throat problems  RESP: NEGATIVE for significant cough or SOB  CV: NEGATIVE for chest pain, palpitations or peripheral edema  GI: NEGATIVE for nausea, abdominal pain, heartburn, or change in bowel habits  : NEGATIVE for frequency, dysuria, or hematuria  MUSCULOSKELETAL: NEGATIVE for significant arthralgias or myalgia  HEME: NEGATIVE for bleeding problems  PSYCHIATRIC: NEGATIVE for changes in mood or affect      Objective    /68   Pulse 79   Temp 98.3  F (36.8  C) (Temporal)   Wt 57.3 kg (126 lb 6.4 oz)   SpO2 96%   BMI 20.40 kg/m    Body mass index is 20.4 kg/m .  Physical Exam   GENERAL: healthy, alert and no distress  SKIN: Wounds look relatively well-preserved.  There is no scaling.  Maybe a little bit of mild erythema noted on the palmar aspect of the hands bilaterally but otherwise no distinct cellulitic changes noted.  PSYCH: mentation appears normal, affect normal/bright        Assessment & Plan     Rash  Improving rash presumed secondary to chemical irritation.  Suggested backing off on using topical steroid creams as needed.  Discussed with patient that may see mild changes in skin for some time until completely resolves.          See Patient Instructions    Return in about 1 month (around 1/31/2021) for if  symptoms recur or worsen.    William Montes MD  [facility]

## 2021-01-01 ENCOUNTER — TELEPHONE (OUTPATIENT)
Dept: PSYCHIATRY | Facility: CLINIC | Age: 51
End: 2021-01-01
Payer: COMMERCIAL

## 2021-01-01 ENCOUNTER — TELEPHONE (OUTPATIENT)
Dept: UROLOGY | Facility: CLINIC | Age: 51
End: 2021-01-01
Payer: COMMERCIAL

## 2021-01-01 ENCOUNTER — OFFICE VISIT (OUTPATIENT)
Dept: UROLOGY | Facility: CLINIC | Age: 51
End: 2021-01-01
Payer: COMMERCIAL

## 2021-01-01 ENCOUNTER — VIRTUAL VISIT (OUTPATIENT)
Dept: PSYCHIATRY | Facility: CLINIC | Age: 51
End: 2021-01-01
Attending: PSYCHIATRY & NEUROLOGY
Payer: COMMERCIAL

## 2021-01-01 VITALS
BODY MASS INDEX: 20.09 KG/M2 | HEART RATE: 89 BPM | HEIGHT: 66 IN | OXYGEN SATURATION: 97 % | WEIGHT: 125 LBS | SYSTOLIC BLOOD PRESSURE: 110 MMHG | DIASTOLIC BLOOD PRESSURE: 62 MMHG

## 2021-01-01 DIAGNOSIS — R33.9 INCOMPLETE BLADDER EMPTYING: ICD-10-CM

## 2021-01-01 DIAGNOSIS — F31.9 BIPOLAR I DISORDER (H): ICD-10-CM

## 2021-01-01 DIAGNOSIS — N39.0 RECURRENT UTI: Primary | ICD-10-CM

## 2021-01-01 DIAGNOSIS — Z87.440 HISTORY OF UTI: ICD-10-CM

## 2021-01-01 LAB
ALBUMIN UR-MCNC: NEGATIVE MG/DL
APPEARANCE UR: CLEAR
BACTERIA UR CULT: NORMAL
BILIRUB UR QL STRIP: NEGATIVE
COLOR UR AUTO: YELLOW
GLUCOSE UR STRIP-MCNC: NEGATIVE MG/DL
HGB UR QL STRIP: ABNORMAL
KETONES UR STRIP-MCNC: NEGATIVE MG/DL
LEUKOCYTE ESTERASE UR QL STRIP: NEGATIVE
NITRATE UR QL: NEGATIVE
PH UR STRIP: 5.5 [PH] (ref 5–7)
SP GR UR STRIP: <=1.005 (ref 1–1.03)
UROBILINOGEN UR STRIP-ACNC: 0.2 E.U./DL

## 2021-01-01 PROCEDURE — 99215 OFFICE O/P EST HI 40 MIN: CPT | Mod: GT | Performed by: PSYCHIATRY & NEUROLOGY

## 2021-01-01 PROCEDURE — 81003 URINALYSIS AUTO W/O SCOPE: CPT | Mod: QW | Performed by: UROLOGY

## 2021-01-01 PROCEDURE — 99214 OFFICE O/P EST MOD 30 MIN: CPT | Performed by: UROLOGY

## 2021-01-01 PROCEDURE — 87109 MYCOPLASMA: CPT | Performed by: UROLOGY

## 2021-01-01 RX ORDER — SUMATRIPTAN 25 MG/1
25 TABLET, FILM COATED ORAL
COMMUNITY

## 2021-01-01 RX ORDER — CLONAZEPAM 1 MG/1
TABLET ORAL
Qty: 360 TABLET | Refills: 0 | Status: SHIPPED | OUTPATIENT
Start: 2021-01-01 | End: 2022-01-01

## 2021-01-01 RX ORDER — OLANZAPINE 5 MG/1
TABLET ORAL
Qty: 360 TABLET | Refills: 3 | OUTPATIENT
Start: 2021-01-01

## 2021-01-01 RX ORDER — OLANZAPINE 5 MG/1
TABLET ORAL
Qty: 360 TABLET | Refills: 0 | Status: SHIPPED | OUTPATIENT
Start: 2021-01-01 | End: 2021-01-01

## 2021-01-01 RX ORDER — OLANZAPINE 20 MG/1
TABLET ORAL
Qty: 90 TABLET | Refills: 3 | OUTPATIENT
Start: 2021-01-01

## 2021-01-01 RX ORDER — OLANZAPINE 20 MG/1
20 TABLET ORAL AT BEDTIME
Qty: 90 TABLET | Refills: 1 | Status: SHIPPED | OUTPATIENT
Start: 2021-01-01 | End: 2022-01-01

## 2021-01-01 RX ORDER — MIRTAZAPINE 30 MG/1
TABLET, FILM COATED ORAL
Qty: 60 TABLET | Refills: 11 | OUTPATIENT
Start: 2021-01-01

## 2021-01-01 RX ORDER — MIRTAZAPINE 30 MG/1
60 TABLET, FILM COATED ORAL AT BEDTIME
Qty: 180 TABLET | Refills: 1 | Status: SHIPPED | OUTPATIENT
Start: 2021-01-01 | End: 2022-01-01

## 2021-01-01 RX ORDER — NITROFURANTOIN 25; 75 MG/1; MG/1
100 CAPSULE ORAL DAILY
Qty: 90 CAPSULE | Refills: 3 | Status: SHIPPED | OUTPATIENT
Start: 2021-01-01 | End: 2022-01-01

## 2021-01-01 RX ORDER — OLANZAPINE 20 MG/1
20 TABLET ORAL AT BEDTIME
Qty: 90 TABLET | Refills: 0 | Status: SHIPPED | OUTPATIENT
Start: 2021-01-01 | End: 2021-01-01

## 2021-01-01 RX ORDER — OLANZAPINE 5 MG/1
TABLET ORAL
Qty: 270 TABLET | Refills: 1 | Status: SHIPPED | OUTPATIENT
Start: 2021-01-01 | End: 2022-01-01

## 2021-01-01 ASSESSMENT — PAIN SCALES - GENERAL
PAINLEVEL: NO PAIN (0)
PAINLEVEL: NO PAIN (0)

## 2021-01-01 ASSESSMENT — MIFFLIN-ST. JEOR: SCORE: 1198.75

## 2021-01-05 ENCOUNTER — DOCUMENTATION ONLY (OUTPATIENT)
Dept: OTHER | Facility: CLINIC | Age: 51
End: 2021-01-05

## 2021-01-06 ENCOUNTER — OFFICE VISIT (OUTPATIENT)
Dept: OBGYN | Facility: CLINIC | Age: 51
End: 2021-01-06
Payer: COMMERCIAL

## 2021-01-06 ENCOUNTER — TELEPHONE (OUTPATIENT)
Dept: OBGYN | Facility: CLINIC | Age: 51
End: 2021-01-06

## 2021-01-06 VITALS — DIASTOLIC BLOOD PRESSURE: 66 MMHG | WEIGHT: 128.1 LBS | BODY MASS INDEX: 20.68 KG/M2 | SYSTOLIC BLOOD PRESSURE: 108 MMHG

## 2021-01-06 DIAGNOSIS — N39.0 URINARY TRACT INFECTION WITHOUT HEMATURIA, SITE UNSPECIFIED: ICD-10-CM

## 2021-01-06 DIAGNOSIS — Z30.011 OCP (ORAL CONTRACEPTIVE PILLS) INITIATION: ICD-10-CM

## 2021-01-06 DIAGNOSIS — Z30.011 OCP (ORAL CONTRACEPTIVE PILLS) INITIATION: Primary | ICD-10-CM

## 2021-01-06 PROCEDURE — 99212 OFFICE O/P EST SF 10 MIN: CPT | Performed by: OBSTETRICS & GYNECOLOGY

## 2021-01-06 RX ORDER — ACETAMINOPHEN AND CODEINE PHOSPHATE 120; 12 MG/5ML; MG/5ML
0.35 SOLUTION ORAL DAILY
Qty: 90 TABLET | Refills: 3 | Status: SHIPPED | OUTPATIENT
Start: 2021-01-06 | End: 2021-01-06

## 2021-01-06 RX ORDER — ACETAMINOPHEN AND CODEINE PHOSPHATE 120; 12 MG/5ML; MG/5ML
0.35 SOLUTION ORAL DAILY
Qty: 90 TABLET | Refills: 4 | Status: SHIPPED | OUTPATIENT
Start: 2021-01-06 | End: 2022-01-01

## 2021-01-06 NOTE — PROGRESS NOTES
SUBJECTIVE:                                                   Ana Laura Wharton is a 50 year old female who presents to clinic today for the following health issue(s):  Patient presents with:  Headache      HPI:  History of migraines, long standing.  Uses a step wise approach to management.  Feels like they are worse since Nexplanon was removed in 11/2020.  Had use continuous OCPs in past with good relief.    I am hesitant to use estrogen containing formulation given the severity of her migraines.  Given she had good relief with a continuous progestin formulation a trial of micronor was advised and will be prescribed.    No LMP recorded. Patient is perimenopausal..     Problem list and histories reviewed & adjusted, as indicated.  Additional history: as documented.    Patient Active Problem List   Diagnosis     Depressed bipolar I disorder in full remission (H)     Anxiety disorder     Suicide ideation     Bipolar disorder current episode depressed (H)     Overdose     Depression     Mood disorder (H)     Suicidal ideations     Bipolar I disorder (H)     Acute renal failure (H)     Alternating exotropia     Anxiety state     Other bipolar disorders     Personality disorder (H)     Tear film insufficiency     Dysfunctions associated with sleep stages or arousal from sleep     Abnormal finding of blood chemistry     Esophageal reflux     Other specified hypothyroidism     Impulse control disorder     Acidosis     Low back pain     Myopia     Opioid dependence (H)     Orofacial dyskinesia     Other migraine without status migrainosus, not intractable     Pure hypercholesterolemia     Poisoning by 4-aminophenol derivatives, undetermined intent     Essential hypertension     Left knee pain     Aftercare following surgery of the musculoskeletal system     UTI (urinary tract infection)     Acute cystitis     Clostridium difficile colitis     Drug overdose     Calcium channel blocker overdose     Intentional acetaminophen  overdose (H)     Alcoholic intoxication with complication (H)     Hypotension due to medication     Acute renal failure, unspecified acute renal failure type (H)     Paroxysmal atrial fibrillation (H)     Acute respiratory failure with hypoxia (H)     Acute pulmonary edema (H)     Spontaneous pneumothorax     PRES (posterior reversible encephalopathy syndrome)     Pancreatitis     Atypical squamous cells cannot exclude high grade squamous intraepithelial lesion on cytologic smear of cervix (ASC-H)     Alcohol withdrawal syndrome without complication (H)     Past Surgical History:   Procedure Laterality Date     ARTHROSCOPY KNEE      L knee     CERVIX SURGERY      for pre-cancerous changes     left knee surgery       ORTHOPEDIC SURGERY      L shoulder     THORACIC SURGERY      for pneumothorax, pleurodesis and lobe resection      Social History     Tobacco Use     Smoking status: Current Every Day Smoker     Packs/day: 0.50     Types: Cigarettes     Smokeless tobacco: Never Used     Tobacco comment: pt states she is quiting and declined resources   Substance Use Topics     Alcohol use: Not Currently     Alcohol/week: 2.0 standard drinks     Types: 2 Glasses of wine per week     Comment: 1 glass of wine per week      Problem (# of Occurrences) Relation (Name,Age of Onset)    Depression (1) Mother    Lipids (1) Father: hyperlipidemia    Macular Degeneration (1) Father    No Known Problems (2) Brother (1), Son (1)    Osteoporosis (1) Mother    Pacemaker (1) Mother                 acetaminophen (TYLENOL) 500 MG tablet, Take 1-2 tablets (500-1,000 mg) by mouth every 6 hours as needed for mild pain       albuterol (PROAIR HFA/PROVENTIL HFA/VENTOLIN HFA) 108 (90 Base) MCG/ACT inhaler, Inhale 2 puffs into the lungs every 6 hours as needed for shortness of breath / dyspnea or wheezing       amLODIPine (NORVASC) 5 MG tablet, Take 1 tablet (5 mg) by mouth daily       atorvastatin (LIPITOR) 10 MG tablet, TAKE 1 TABLET EVERY  DAY       betamethasone valerate (VALISONE) 0.1 % external ointment, Apply topically 2 times daily       carvedilol (COREG) 12.5 MG tablet, TAKE 1 TABLET TWICE DAILY WITH MEALS       clindamycin (CLEOCIN T) 1 % external lotion, Apply topically daily       clonazePAM (KLONOPIN) 1 MG tablet, Take 1 tablet (1 mg) by mouth 2 times daily AND 2 tablets (2 mg) At Bedtime.       cyclobenzaprine (FLEXERIL) 10 MG tablet, Take 1 tablet (10 mg) by mouth At Bedtime       eletriptan (RELPAX) 40 MG tablet, Take 2 tablets (80 mg) by mouth at onset of headache for migraine May repeat in 2 hours. Max 2 tablets/24 hours.       Emollient (HYDROPHOR EX), Externally apply topically At Bedtime       famotidine (PEPCID) 10 MG tablet, Take 1 tablet (10 mg) by mouth 2 times daily       FIBER PO, Take 1 capsule by mouth daily        fluticasone (FLONASE) 50 MCG/ACT nasal spray, SHAKE LIQUID AND USE 1 SPRAY IN EACH NOSTRIL DAILY       folic acid (FOLVITE) 400 MCG tablet, Take 400 mcg by mouth daily       hydrOXYzine (VISTARIL) 50 MG capsule, Take 1 capsule (50 mg) by mouth 2 times daily. May also take 1 capsule (50 mg) daily as needed for anxiety.       ketorolac (TORADOL) 30 MG/ML injection, every 7 days       levothyroxine (SYNTHROID/LEVOTHROID) 50 MCG tablet, TAKE 1 TABLET EVERY DAY       mirtazapine (REMERON) 30 MG tablet, Take 2 tablets (60 mg) by mouth At Bedtime       Multiple Vitamins-Minerals (WOMENS MULTI VITAMIN & MINERAL PO), Take 1 tablet by mouth daily       nitroFURantoin macrocrystal (MACRODANTIN) 100 MG capsule, Take 1 capsule (100 mg) by mouth 2 times daily       OLANZapine (ZYPREXA) 15 MG tablet, Take 15 mg by mouth At Bedtime       OLANZapine (ZYPREXA) 5 MG tablet, Take 5 mg by mouth every morning       ondansetron (ZOFRAN) 4 MG tablet, Take 1 tablet (4 mg) by mouth every 8 hours as needed for nausea       pantoprazole (PROTONIX) 40 MG EC tablet, Take 1 tablet (40 mg) by mouth 2 times daily       polyethylene glycol  "(MIRALAX) 17 g packet, Take 1 packet by mouth daily as needed for constipation       sennosides (SENOKOT) 8.6 MG tablet, Take 4 tablets by mouth daily       sodium chloride (OCEAN) 0.65 % nasal spray, Use in both nostrils 3 times a day.       Syringe/Needle, Disp, (SYRINGE LUER LOCK) 25G X 1\" 3 ML MISC, 1 Act once a week       triamcinolone (KENALOG) 0.1 % external ointment, Apply topically as needed for irritation    No current facility-administered medications on file prior to visit.     Allergies   Allergen Reactions     Azithromycin      Other reaction(s): Dermatitis     Cephalexin      PN: LW Reaction: Itching, Pruritis     Ciprofloxacin Other (See Comments)     Joint pain cdiff     Compazine [Prochlorperazine] Other (See Comments)     dystonia     Metoclopramide Other (See Comments)     \"I feel like I am crawling out of my skin\"     Minocycline Nausea and Vomiting     PN: DIARRHEA, VOMITING, AND STOMACH CRAMPS     Reglan [Metoclopramide Hcl] Other (See Comments)     Sensation of \"crawling out of skin\"     Restoril [Temazepam]      Thorazine [Chlorpromazine] Other (See Comments)     dystonia     Abilify [Aripiprazole] Rash     Lamotrigine Rash     Severe drug rash - contraindication to receiving again. Skin peeling.        ROS:  5 point ROS negative except as noted above in HPI, including Gen., Resp., CV, GI &  system review.    OBJECTIVE:     /66   Wt 58.1 kg (128 lb 1.6 oz)   BMI 20.68 kg/m     BMI: Body mass index is 20.68 kg/m .  General: Alert and oriented, no distress.  Psychiatric: Mood and affect within normal limits.  Deferred    ASSESSMENT/PLAN:                                                        ICD-10-CM    1. OCP (oral contraceptive pills) initiation  Z30.011 norethindrone (MICRONOR) 0.35 MG tablet         Trial of progestin only OCP    Celso Mcwilliams MD  [facility]  "

## 2021-01-06 NOTE — TELEPHONE ENCOUNTER
Pharmacy calling:  Need Rx to state that she takes continuously.  Wants it faxed to [facility].  Rx adjusted and faxed as requested.  Marina Noguera RN

## 2021-01-14 ENCOUNTER — TELEPHONE (OUTPATIENT)
Dept: PSYCHIATRY | Facility: CLINIC | Age: 51
End: 2021-01-14

## 2021-01-14 NOTE — TELEPHONE ENCOUNTER
Hattie Hare, RN  Hattie Hare, RN   Phone Number: [phone]          Previous Messages    ----- Message -----   From: Leticia Al   Sent: 1/14/2021  12:25 PM CST   To: [facility]   Subject: Michael's pt                                     Pt asking for PA on meds:     OLANZapine (ZYPREXA) 5 MG tablet   hydrOXYzine (VISTARIL) 50 MG capsule     She also requested for phone call confirmation about the PA.   [phone]     Her insurance just switched from [employer] to [employer]. Insurance has been updated.        Called pt and gathered the following information:    -She is taking Zyprexa 5 mg BID   -She is taking hydroxyzine 50 mg BID + once daily PRN  -Pt would like to try [facility] mail order pharmacy   -Running low on Zyprexa, but doing okay on Vistaril    Writer agreed to start prior auths for both medications. Will start these tomorrow morning (reminder set).

## 2021-01-15 ENCOUNTER — TELEPHONE (OUTPATIENT)
Dept: PSYCHIATRY | Facility: CLINIC | Age: 51
End: 2021-01-15

## 2021-01-15 DIAGNOSIS — F31.9 BIPOLAR I DISORDER (H): ICD-10-CM

## 2021-01-15 DIAGNOSIS — F31.9 BIPOLAR I DISORDER (H): Primary | ICD-10-CM

## 2021-01-15 RX ORDER — HYDROXYZINE PAMOATE 50 MG/1
CAPSULE ORAL
Qty: 270 CAPSULE | Refills: 0 | Status: SHIPPED | OUTPATIENT
Start: 2021-01-15 | End: 2021-06-10

## 2021-01-15 RX ORDER — OLANZAPINE 5 MG/1
TABLET ORAL
Qty: 180 TABLET | Refills: 0 | Status: SHIPPED | OUTPATIENT
Start: 2021-01-15 | End: 2021-04-01

## 2021-01-15 NOTE — TELEPHONE ENCOUNTER
Central Prior Authorization Team   Phone: [phone]      PA Initiation    Medication: olanzapine (Zyprexa) 5 mg tablet  Insurance Company: [employer] ([facility]) - Phone [phone] Fax [phone]  Pharmacy Filling the Rx: [facility] - [address]  Filling Pharmacy Phone: [phone]  Filling Pharmacy Fax:    Start Date: 1/15/2021

## 2021-01-15 NOTE — TELEPHONE ENCOUNTER
Received confirmation from Dr. Rodriguez that pt is taking Zyprexa 5 mg BID: once daily and once at bedtime with 10 mg tab. Rx sent to pt's new pharmacy.

## 2021-01-15 NOTE — TELEPHONE ENCOUNTER
Prior Authorization Approval (2/day)    Authorization Effective Date: 1/5/2021  Authorization Expiration Date: 12/31/2021  Medication: olanzapine (Zyprexa) 5 mg tablet (2/day) + 10mg tab dose  Approved Dose/Quantity: 180  Reference #: [id number]   Insurance Company: [employer] ([facility]) - Phone [phone] Fax [phone]  Expected CoPay:       Which Pharmacy is filling the prescription (Not needed for infusion/clinic administered): [facility] - [address]  Pharmacy Notified: Yes - spoke to female who was able to process rx while on the phone with me. Order will go out to patient today or tomorrow, she will receive in  3-5 days.  Patient Notified: No

## 2021-01-15 NOTE — TELEPHONE ENCOUNTER
Prior Authorization Retail Medication Request  URGENT REQUEST  Medication/Dose: olanzapine (Zyprexa) 5 mg tablet  ICD code (if different than what is on RX):  Bipolar I disorder (H) [F31.9]  Previously Tried and Failed:      Amitriptyline   Aripiprazole (prior to care at [facility]): caused peeling skin  Chlorpromazine (04/2014): caused akathisia  Depakote: caused significant sedation  Geodon (08/2012-01/2014): built up tolerance, no longer effective  Lamictal: caused peeling skin  Latuda  Lithium: built up tolerance, no longer effective. Also led to significance GI upset  Mirtazapine (current)  Quetiapine: significant weight gain   *Pt has tried other medications prior to care at the     Rationale: Alexandre has struggled with her mental health most of her life. She has a history of trauma in her childhood and throughout her lifetime. Ana Laura was initially diagnosed with major depressive disorder and had numerous hospitalizations for SI/SIB. In 2005, her therapist realized she was experiencing cycling and was concerned for Bipolar disorder. This included impulsivity, increased spending, elevated mood, and decreased need for sleep. The hypomania/lidna would last for about two weeks and then she would crash and become extremely depression. Pt reports this cycling had been occurring since the late 90s, but didn't realize this could be related to Bipolar disorder. She later received this diagnosis while hospitalized. Overall, Ana Laura has been hospitalized 30+ times for mental health concerns. Typically due to SI/SIB. She has attempted suicide by overdose of Klonopin. Along with the significant cycling in her mood, the pt reports psychosis symptoms. The most distressing is paranoia. Ana Laura was started on Zyprexa 5 mg BID for the psychosis symptoms in October of 2017 and has continued on this dose since. It's important to mention, she has not been hospitalized for mental health concerns since March of 2017. This proves Zyprexa is  an effective medication in managing her mood and psychosis symptoms. Please take this into consideration when reviewing this PA. Denying the medication now would definitely lead to decompensation and another hospitalization.      Insurance Name:  [employer] [facility]   Insurance ID:  [id number]      Pharmacy Information (if different than what is on RX)  Name:   Phone:

## 2021-01-15 NOTE — TELEPHONE ENCOUNTER
Called pt and LVM notifying her of the approval. Informed her that she should receive the medication in 3-5 days.

## 2021-01-15 NOTE — TELEPHONE ENCOUNTER
Prior Authorization Retail Medication Request    Medication/Dose: hydrOXYzine (VISTARIL) 50 MG capsule  ICD code (if different than what is on RX):  Bipolar I disorder (H) [F31.9]   Previously Tried and Failed:      Buspirone (01/2019): caused sedation/low energy  Clonazepam (current)  Gabapentin  Hydroxyzine (Atarax): ineffective  Lorazepam  Propranolol (11/2015)  Temazepam     Rationale:   Pt has struggled with her mental health most of her life. She has a history of trauma in her childhood and throughout her lifetime. Ana Laura was initially diagnosed with major depressive disorder and had numerous hospitalizations for SI/SIB. In 2005, her therapist realized she was experiencing cycling and was concerned for Bipolar disorder. This included impulsivity, increased spending, elevated mood, and decreased need for sleep. The hypomania/linda would last for about two weeks and then she would crash and become extremely depression. Pt reports this cycling had been occurring since the late 90s, but didn't realize this could be related to Bipolar disorder. She later received this diagnosis while hospitalized. Overall, Ana Laura has been hospitalized 30+ times for mental health concerns. Typically due to SI/SIB and heightened anxiety. She has attempted suicide by overdose of Klonopin. Along with the significant cycling in her mood, the pt reports psychosis symptoms. The most distressing is paranoia. Ana Laura was started on hydroxzyine (Vistaril) 50 mg BID + once daily PRN for the anxiety in October of 2017 and has continued on this dose since. It's important to mention, she has not been hospitalized for mental health concerns since March of 2017. This proves hydroxyzine is an effective medication in managing her mood and anxiety. Please take this into consideration when reviewing this PA. Denying the medication now would definitely lead to decompensation and another hospitalization.    Insurance Name:  [employer] [facility]  [facility]  Insurance ID:  [id number]      Pharmacy Information (if different than what is on RX)  Name:   Phone:

## 2021-01-18 ENCOUNTER — OFFICE VISIT (OUTPATIENT)
Dept: INTERNAL MEDICINE | Facility: CLINIC | Age: 51
End: 2021-01-18
Payer: COMMERCIAL

## 2021-01-18 VITALS
OXYGEN SATURATION: 98 % | RESPIRATION RATE: 12 BRPM | TEMPERATURE: 98 F | HEART RATE: 58 BPM | DIASTOLIC BLOOD PRESSURE: 64 MMHG | BODY MASS INDEX: 19.93 KG/M2 | WEIGHT: 124 LBS | SYSTOLIC BLOOD PRESSURE: 100 MMHG | HEIGHT: 66 IN

## 2021-01-18 DIAGNOSIS — Z00.00 ENCOUNTER FOR PREVENTATIVE ADULT HEALTH CARE EXAMINATION: Primary | ICD-10-CM

## 2021-01-18 DIAGNOSIS — F31.9 BIPOLAR I DISORDER (H): ICD-10-CM

## 2021-01-18 DIAGNOSIS — Z12.11 COLON CANCER SCREENING: ICD-10-CM

## 2021-01-18 DIAGNOSIS — L30.9 ECZEMA, UNSPECIFIED TYPE: ICD-10-CM

## 2021-01-18 DIAGNOSIS — E03.9 ACQUIRED HYPOTHYROIDISM: ICD-10-CM

## 2021-01-18 DIAGNOSIS — I10 BENIGN ESSENTIAL HYPERTENSION: ICD-10-CM

## 2021-01-18 DIAGNOSIS — L70.9 ACNE, UNSPECIFIED ACNE TYPE: ICD-10-CM

## 2021-01-18 DIAGNOSIS — Z12.31 ENCOUNTER FOR SCREENING MAMMOGRAM FOR BREAST CANCER: ICD-10-CM

## 2021-01-18 DIAGNOSIS — E78.5 HYPERLIPIDEMIA LDL GOAL <130: ICD-10-CM

## 2021-01-18 DIAGNOSIS — K21.9 GASTROESOPHAGEAL REFLUX DISEASE WITHOUT ESOPHAGITIS: ICD-10-CM

## 2021-01-18 LAB
ALBUMIN UR-MCNC: NEGATIVE MG/DL
APPEARANCE UR: CLEAR
BILIRUB UR QL STRIP: NEGATIVE
COLOR UR AUTO: YELLOW
ERYTHROCYTE [DISTWIDTH] IN BLOOD BY AUTOMATED COUNT: 13 % (ref 10–15)
GLUCOSE UR STRIP-MCNC: NEGATIVE MG/DL
HCT VFR BLD AUTO: 42.6 % (ref 35–47)
HGB BLD-MCNC: 14.3 G/DL (ref 11.7–15.7)
HGB UR QL STRIP: NEGATIVE
KETONES UR STRIP-MCNC: NEGATIVE MG/DL
LEUKOCYTE ESTERASE UR QL STRIP: NEGATIVE
MCH RBC QN AUTO: 32.4 PG (ref 26.5–33)
MCHC RBC AUTO-ENTMCNC: 33.6 G/DL (ref 31.5–36.5)
MCV RBC AUTO: 96 FL (ref 78–100)
NITRATE UR QL: NEGATIVE
PH UR STRIP: 5.5 PH (ref 5–7)
PLATELET # BLD AUTO: 219 10E9/L (ref 150–450)
RBC # BLD AUTO: 4.42 10E12/L (ref 3.8–5.2)
SOURCE: NORMAL
SP GR UR STRIP: <=1.005 (ref 1–1.03)
UROBILINOGEN UR STRIP-ACNC: 0.2 EU/DL (ref 0.2–1)
WBC # BLD AUTO: 13.2 10E9/L (ref 4–11)

## 2021-01-18 PROCEDURE — 80061 LIPID PANEL: CPT | Performed by: INTERNAL MEDICINE

## 2021-01-18 PROCEDURE — 99396 PREV VISIT EST AGE 40-64: CPT | Performed by: INTERNAL MEDICINE

## 2021-01-18 PROCEDURE — 85027 COMPLETE CBC AUTOMATED: CPT | Performed by: INTERNAL MEDICINE

## 2021-01-18 PROCEDURE — 99214 OFFICE O/P EST MOD 30 MIN: CPT | Mod: 25 | Performed by: INTERNAL MEDICINE

## 2021-01-18 PROCEDURE — 80053 COMPREHEN METABOLIC PANEL: CPT | Performed by: INTERNAL MEDICINE

## 2021-01-18 PROCEDURE — 36415 COLL VENOUS BLD VENIPUNCTURE: CPT | Performed by: INTERNAL MEDICINE

## 2021-01-18 PROCEDURE — 81003 URINALYSIS AUTO W/O SCOPE: CPT | Performed by: INTERNAL MEDICINE

## 2021-01-18 PROCEDURE — 84443 ASSAY THYROID STIM HORMONE: CPT | Performed by: INTERNAL MEDICINE

## 2021-01-18 RX ORDER — CYCLOBENZAPRINE HCL 10 MG
10 TABLET ORAL AT BEDTIME
Qty: 90 TABLET | Refills: 4 | Status: SHIPPED | OUTPATIENT
Start: 2021-01-18 | End: 2021-06-28

## 2021-01-18 RX ORDER — MOMETASONE FUROATE 1 MG/G
CREAM TOPICAL DAILY
Qty: 50 G | Refills: 1 | Status: SHIPPED | OUTPATIENT
Start: 2021-01-18 | End: 2021-06-01

## 2021-01-18 RX ORDER — CLOTRIMAZOLE 1 %
CREAM (GRAM) TOPICAL 2 TIMES DAILY
Status: CANCELLED | OUTPATIENT
Start: 2021-01-18

## 2021-01-18 RX ORDER — ATORVASTATIN CALCIUM 10 MG/1
TABLET, FILM COATED ORAL
Qty: 90 TABLET | Refills: 3 | Status: SHIPPED | OUTPATIENT
Start: 2021-01-18 | End: 2021-09-24

## 2021-01-18 RX ORDER — CARVEDILOL 12.5 MG/1
TABLET ORAL
Qty: 180 TABLET | Refills: 3 | Status: SHIPPED | OUTPATIENT
Start: 2021-01-18 | End: 2021-09-24

## 2021-01-18 RX ORDER — LEVOTHYROXINE SODIUM 50 UG/1
TABLET ORAL
Qty: 90 TABLET | Refills: 3 | Status: SHIPPED | OUTPATIENT
Start: 2021-01-18 | End: 2021-09-24

## 2021-01-18 RX ORDER — KETOROLAC TROMETHAMINE 30 MG/ML
INJECTION, SOLUTION INTRAMUSCULAR; INTRAVENOUS
Status: CANCELLED | OUTPATIENT
Start: 2021-01-18

## 2021-01-18 RX ORDER — CLINDAMYCIN PHOSPHATE 10 UG/ML
LOTION TOPICAL DAILY
Qty: 60 ML | Refills: 3 | Status: SHIPPED | OUTPATIENT
Start: 2021-01-18 | End: 2022-01-01

## 2021-01-18 RX ORDER — PANTOPRAZOLE SODIUM 40 MG/1
40 TABLET, DELAYED RELEASE ORAL 2 TIMES DAILY
Qty: 90 TABLET | Refills: 4 | Status: SHIPPED | OUTPATIENT
Start: 2021-01-18 | End: 2021-07-26

## 2021-01-18 RX ORDER — PETROLATUM 42 G/100G
OINTMENT TOPICAL DAILY
Qty: 454 G | Refills: 3 | Status: SHIPPED | OUTPATIENT
Start: 2021-01-18

## 2021-01-18 ASSESSMENT — ENCOUNTER SYMPTOMS
NAUSEA: 0
CONSTIPATION: 0
PALPITATIONS: 0
CHILLS: 0
SHORTNESS OF BREATH: 0
SORE THROAT: 0
DIZZINESS: 0
JOINT SWELLING: 0
HEARTBURN: 0
WEAKNESS: 0
NERVOUS/ANXIOUS: 0
MYALGIAS: 0
EYE PAIN: 0
DIARRHEA: 0
ABDOMINAL PAIN: 0
PARESTHESIAS: 0
ARTHRALGIAS: 0
DYSURIA: 0
FEVER: 0
BREAST MASS: 0
FREQUENCY: 0
HEMATURIA: 0
HEMATOCHEZIA: 0
COUGH: 0
HEADACHES: 1

## 2021-01-18 ASSESSMENT — MIFFLIN-ST. JEOR: SCORE: 1191.27

## 2021-01-18 ASSESSMENT — ACTIVITIES OF DAILY LIVING (ADL): CURRENT_FUNCTION: NO ASSISTANCE NEEDED

## 2021-01-18 NOTE — LETTER
January 19, 2021      Ana Laura Wharton  [address]   [place] [id number]        Dear ,    We are writing to inform you of your test results.    Normal result reviewed. Slightly elevated WBC and one of the liver enzymes.   Recommend to recheck in 3 months CBC, LFT.     Resulted Orders   CBC with platelets   Result Value Ref Range    WBC 13.2 (H) 4.0 - 11.0 10e9/L      Comment:      Results confirmed by repeat test    RBC Count 4.42 3.8 - 5.2 10e12/L    Hemoglobin 14.3 11.7 - 15.7 g/dL    Hematocrit 42.6 35.0 - 47.0 %    MCV 96 78 - 100 fl    MCH 32.4 26.5 - 33.0 pg    MCHC 33.6 31.5 - 36.5 g/dL    RDW 13.0 10.0 - 15.0 %    Platelet Count 219 150 - 450 10e9/L   Comprehensive metabolic panel   Result Value Ref Range    Sodium 138 133 - 144 mmol/L    Potassium 4.0 3.4 - 5.3 mmol/L    Chloride 107 94 - 109 mmol/L    Carbon Dioxide 22 20 - 32 mmol/L    Anion Gap 9 3 - 14 mmol/L    Glucose 85 70 - 99 mg/dL      Comment:      Fasting specimen    Urea Nitrogen 11 7 - 30 mg/dL    Creatinine 0.84 0.52 - 1.04 mg/dL    GFR Estimate 80 >60 mL/min/[1.73_m2]      Comment:      Non  GFR Calc  Starting 12/18/2018, serum creatinine based estimated GFR (eGFR) will be   calculated using the Chronic Kidney Disease Epidemiology Collaboration   (CKD-EPI) equation.      GFR Estimate If Black >90 >60 mL/min/[1.73_m2]      Comment:       GFR Calc  Starting 12/18/2018, serum creatinine based estimated GFR (eGFR) will be   calculated using the Chronic Kidney Disease Epidemiology Collaboration   (CKD-EPI) equation.      Calcium 9.4 8.5 - 10.1 mg/dL    Bilirubin Total 0.6 0.2 - 1.3 mg/dL    Albumin 4.0 3.4 - 5.0 g/dL    Protein Total 8.1 6.8 - 8.8 g/dL    Alkaline Phosphatase 139 40 - 150 U/L    ALT 32 0 - 50 U/L    AST 56 (H) 0 - 45 U/L   Lipid panel reflex to direct LDL Fasting   Result Value Ref Range    Cholesterol 183 <200 mg/dL    Triglycerides 103 <150 mg/dL      Comment:       Fasting specimen    HDL Cholesterol 47 (L) >49 mg/dL    LDL Cholesterol Calculated 115 (H) <100 mg/dL      Comment:      Above desirable:  100-129 mg/dl  Borderline High:  130-159 mg/dL  High:             160-189 mg/dL  Very high:       >189 mg/dl      Non HDL Cholesterol 136 (H) <130 mg/dL      Comment:      Above Desirable:  130-159 mg/dl  Borderline high:  160-189 mg/dl  High:             190-219 mg/dl  Very high:       >219 mg/dl     TSH with free T4 reflex   Result Value Ref Range    TSH 1.28 0.40 - 4.00 mU/L   *UA reflex to Microscopic   Result Value Ref Range    Color Urine Yellow     Appearance Urine Clear     Glucose Urine Negative NEG^Negative mg/dL    Bilirubin Urine Negative NEG^Negative    Ketones Urine Negative NEG^Negative mg/dL    Specific Gravity Urine <=1.005 1.003 - 1.035    Blood Urine Negative NEG^Negative    pH Urine 5.5 5.0 - 7.0 pH    Protein Albumin Urine Negative NEG^Negative mg/dL    Urobilinogen Urine 0.2 0.2 - 1.0 EU/dL    Nitrite Urine Negative NEG^Negative    Leukocyte Esterase Urine Negative NEG^Negative    Source Midstream Urine        If you have any questions or concerns, please call the clinic at the number listed above.       Sincerely,      Liborio Lincoln MD

## 2021-01-18 NOTE — NURSING NOTE
"/64   Pulse 58   Temp 98  F (36.7  C) (Oral)   Resp 12   Ht 1.664 m (5' 5.5\")   Wt 56.2 kg (124 lb)   SpO2 98%   Breastfeeding No   BMI 20.32 kg/m      "

## 2021-01-18 NOTE — PROGRESS NOTES
"   SUBJECTIVE:   CC: Ana Laura Wharton is an 50 year old woman who presents for preventive health visit.       Patient has been advised of split billing requirements and indicates understanding: Yes  Healthy Habits:     In general, how would you rate your overall health?  Good    Frequency of exercise:  2-3 days/week    Duration of exercise:  45-60 minutes    Do you usually eat at least 4 servings of fruit and vegetables a day, include whole grains    & fiber and avoid regularly eating high fat or \"junk\" foods?  Yes    Taking medications regularly:  Yes    Ability to successfully perform activities of daily living:  No assistance needed    Home Safety:  No safety concerns identified    Hearing Impairment:  No hearing concerns    In the past 6 months, have you been bothered by leaking of urine?  No    In general, how would you rate your overall mental or emotional health?  Good      PHQ-2 Total Score: 0    Additional concerns today:  No    Ability to successfully perform activities of daily living: Yes, no assistance needed  Home safety:  none identified   Hearing impairment: No        PROBLEMS TO ADD ON...  Has h/o HTN. on medical treatment. BP has been controlled. No side effects from medications. No CP, HA, dizziness. good compliance with medications and low salt diet.  Has H/O hyperlipidemia. On medical treatment and diet. No side effects. No muscle weakness, myalgias or upset stomach.   Has history of hypothyroidism. On replacement treatment with Synthroid. No heat /cold intolerance, heart palpitations, weight loss/ gain ,  change in bowel habits.  Has h/o bipolar disorder, follows with psychiatry. Reports controlled symptoms.   Has h/o migraines. Seen neurology. Has more severe headaches 1-2 times a month. Has been injecting Toradol as needed.   Has h/o eczema and acne. Uses skin moisturizers, topical steroid, topical antibiotic. Helping with symptoms.     Today's PHQ-2 Score:   PHQ-2 ( 1999 Pfizer) 1/18/2021 "   Q1: Little interest or pleasure in doing things -   Q2: Feeling down, depressed or hopeless -   PHQ-2 Score -   Q1: Little interest or pleasure in doing things -   Q2: Feeling down, depressed or hopeless -   PHQ-2 Score Incomplete   Some encounter information is confidential and restricted. Go to Review Flowsheets activity to see all data.       Abuse: Current or Past (Physical, Sexual or Emotional) - No  Do you feel safe in your environment? Yes        Social History     Tobacco Use     Smoking status: Current Every Day Smoker     Packs/day: 0.50     Types: Cigarettes     Smokeless tobacco: Never Used     Tobacco comment: pt states she is quiting and declined resources   Substance Use Topics     Alcohol use: Not Currently     Alcohol/week: 2.0 standard drinks     Types: 2 Glasses of wine per week     Comment: 1 glass of wine per week     If you drink alcohol do you typically have >3 drinks per day or >7 drinks per week? No    Alcohol Use 1/13/2020   Prescreen: >3 drinks/day or >7 drinks/week? No   No flowsheet data found.    Reviewed orders with patient.  Reviewed health maintenance and updated orders accordingly - Yes  Lab work is in process  Labs reviewed in [facility]    Mammogram Screening: Patient over age 50, mutual decision to screen reflected in health maintenance.    Pertinent mammograms are reviewed under the imaging tab.  History of abnormal Pap smear: NO - age 30- 65 PAP every 3 years recommended  PAP / HPV Latest Ref Rng & Units 2/19/2020 1/9/2019   PAP - NIL ASC-H(A)   HPV 16 DNA NEG:Negative Negative Negative   HPV 18 DNA NEG:Negative Negative Negative   OTHER HR HPV NEG:Negative Positive(A) Positive(A)     Reviewed and updated as needed this visit by clinical staff  Tobacco  Allergies    Med Hx  Surg Hx  Fam Hx  Soc Hx        Reviewed and updated as needed this visit by Provider                    Review of Systems   Constitutional: Negative for chills and fever.   HENT: Negative for congestion,  ear pain, hearing loss and sore throat.    Eyes: Negative for pain and visual disturbance.   Respiratory: Negative for cough and shortness of breath.    Cardiovascular: Negative for chest pain, palpitations and peripheral edema.   Gastrointestinal: Negative for abdominal pain, constipation, diarrhea, heartburn, hematochezia and nausea.   Breasts:  Negative for tenderness, breast mass and discharge.   Genitourinary: Negative for dysuria, frequency, genital sores, hematuria, pelvic pain, urgency, vaginal bleeding and vaginal discharge.   Musculoskeletal: Negative for arthralgias, joint swelling and myalgias.   Skin: Positive for rash.   Neurological: Positive for headaches. Negative for dizziness, weakness and paresthesias.   Psychiatric/Behavioral: Negative for mood changes. The patient is not nervous/anxious.       OBJECTIVE:   There were no vitals taken for this visit.  Physical Exam  GENERAL: healthy, alert and no distress  EYES: Eyes grossly normal to inspection, PERRL and conjunctivae and sclerae normal  HENT: ear canals and TM's normal, nose and mouth without ulcers or lesions  NECK: no adenopathy, no asymmetry, masses, or scars and thyroid normal to palpation  RESP: lungs clear to auscultation - no rales, rhonchi or wheezes  CV: regular rate and rhythm, normal S1 S2, no S3 or S4, no murmur, click or rub, no peripheral edema and peripheral pulses strong  ABDOMEN: soft, nontender, no hepatosplenomegaly, no masses and bowel sounds normal  MS: no gross musculoskeletal defects noted, no edema  SKIN: no suspicious lesions or rashes  NEURO: Normal strength and tone, mentation intact and speech normal  PSYCH: mentation appears normal, affect normal/bright    Diagnostic Test Results:  Labs reviewed in Epic    ASSESSMENT/PLAN:       ICD-10-CM    1. Encounter for preventative adult health care examination  Z00.00 CBC with platelets     Comprehensive metabolic panel     Lipid panel reflex to direct LDL Fasting     TSH  "with free T4 reflex     *UA reflex to Microscopic   2. Bipolar I disorder (H)  F31.9 cyclobenzaprine (FLEXERIL) 10 MG tablet   3. Acquired hypothyroidism  E03.9 levothyroxine (SYNTHROID/LEVOTHROID) 50 MCG tablet   4. Hyperlipidemia LDL goal <130  E78.5 atorvastatin (LIPITOR) 10 MG tablet   5. Gastroesophageal reflux disease without esophagitis  K21.9 pantoprazole (PROTONIX) 40 MG EC tablet   6. Benign essential hypertension  I10 carvedilol (COREG) 12.5 MG tablet     CBC with platelets     Comprehensive metabolic panel     Lipid panel reflex to direct LDL Fasting     TSH with free T4 reflex     *UA reflex to Microscopic   7. Eczema, unspecified type  L30.9 Emollient (HYDROPHOR) OINT     mometasone (ELOCON) 0.1 % external cream   8. Acne, unspecified acne type  L70.9 clindamycin (CLEOCIN T) 1 % external lotion   9. Colon cancer screening  Z12.11 GASTROENTEROLOGY ADULT REF PROCEDURE ONLY   10. Encounter for screening mammogram for breast cancer  Z12.31 *MA Screening Digital Bilateral       Assess lab work  Continue treatment   Advised to follow up with neurology for migraines       Patient has been advised of split billing requirements and indicates understanding: Yes  COUNSELING:  Reviewed preventive health counseling, as reflected in patient instructions       Regular exercise       Healthy diet/nutrition       Vision screening       Colon cancer screening    Estimated body mass index is 20.68 kg/m  as calculated from the following:    Height as of 10/25/20: 1.676 m (5' 6\").    Weight as of 1/6/21: 58.1 kg (128 lb 1.6 oz).        She reports that she has been smoking cigarettes. She has been smoking about 0.50 packs per day. She has never used smokeless tobacco.  Tobacco Cessation Action Plan:   Pharmacotherapies : Nicotine patch      Counseling Resources:  ATP IV Guidelines  Pooled Cohorts Equation Calculator  Breast Cancer Risk Calculator  BRCA-Related Cancer Risk Assessment: FHS-7 Tool  FRAX Risk Assessment  ICSI " Preventive Guidelines  Dietary Guidelines for Americans, 2010  USDA's MyPlate  ASA Prophylaxis  Lung CA Screening    Liborio Lincoln MD  [facility]

## 2021-01-19 LAB
ALBUMIN SERPL-MCNC: 4 G/DL (ref 3.4–5)
ALP SERPL-CCNC: 139 U/L (ref 40–150)
ALT SERPL W P-5'-P-CCNC: 32 U/L (ref 0–50)
ANION GAP SERPL CALCULATED.3IONS-SCNC: 9 MMOL/L (ref 3–14)
AST SERPL W P-5'-P-CCNC: 56 U/L (ref 0–45)
BILIRUB SERPL-MCNC: 0.6 MG/DL (ref 0.2–1.3)
BUN SERPL-MCNC: 11 MG/DL (ref 7–30)
CALCIUM SERPL-MCNC: 9.4 MG/DL (ref 8.5–10.1)
CHLORIDE SERPL-SCNC: 107 MMOL/L (ref 94–109)
CHOLEST SERPL-MCNC: 183 MG/DL
CO2 SERPL-SCNC: 22 MMOL/L (ref 20–32)
CREAT SERPL-MCNC: 0.84 MG/DL (ref 0.52–1.04)
GFR SERPL CREATININE-BSD FRML MDRD: 80 ML/MIN/{1.73_M2}
GLUCOSE SERPL-MCNC: 85 MG/DL (ref 70–99)
HDLC SERPL-MCNC: 47 MG/DL
LDLC SERPL CALC-MCNC: 115 MG/DL
NONHDLC SERPL-MCNC: 136 MG/DL
POTASSIUM SERPL-SCNC: 4 MMOL/L (ref 3.4–5.3)
PROT SERPL-MCNC: 8.1 G/DL (ref 6.8–8.8)
SODIUM SERPL-SCNC: 138 MMOL/L (ref 133–144)
TRIGL SERPL-MCNC: 103 MG/DL
TSH SERPL DL<=0.005 MIU/L-ACNC: 1.28 MU/L (ref 0.4–4)

## 2021-01-19 NOTE — TELEPHONE ENCOUNTER
Central Prior Authorization Team   Phone: [phone]      [place] Initiation    Medication: hydrOXYzine (VISTARIL) 50 MG capsule  Insurance Company: [employer] ([facility]) - Phone [phone] Fax [phone]  Pharmacy Filling the Rx: [facility] - [address]  Filling Pharmacy Phone: [phone]  Filling Pharmacy Fax:    Start Date: 1/19/2021

## 2021-01-20 ENCOUNTER — AMBULATORY - HEALTHEAST (OUTPATIENT)
Dept: OTHER | Facility: CLINIC | Age: 51
End: 2021-01-20

## 2021-01-20 ENCOUNTER — DOCUMENTATION ONLY (OUTPATIENT)
Dept: OTHER | Facility: CLINIC | Age: 51
End: 2021-01-20

## 2021-01-20 RX ORDER — KETOROLAC TROMETHAMINE 30 MG/ML
INJECTION, SOLUTION INTRAMUSCULAR; INTRAVENOUS
OUTPATIENT
Start: 2021-01-20

## 2021-01-20 NOTE — TELEPHONE ENCOUNTER
Routing refill request to provider for review/approval because:  Drug not on the [facility] refill protocol   Medication is reported/historical  Alivia Mckenzie RN, BSN

## 2021-01-20 NOTE — TELEPHONE ENCOUNTER
Prior Authorization is not needed, this is covered.    Medication: hydrOXYzine (VISTARIL) 50 MG capsule  Insurance Company: [employer] ([facility]) - Phone [phone] Fax [phone]  Expected CoPay:      Pharmacy Filling the Rx: [facility] - [facility] [address] [place] [address]  Pharmacy Notified: No  Patient Notified: No

## 2021-01-20 NOTE — TELEPHONE ENCOUNTER
Harleen Jhaveri  [facility] Just now (11:12 AM)     Prior Authorization is not needed, this is covered.

## 2021-01-22 ENCOUNTER — VIRTUAL VISIT (OUTPATIENT)
Dept: PSYCHIATRY | Facility: CLINIC | Age: 51
End: 2021-01-22
Attending: PSYCHIATRY & NEUROLOGY
Payer: COMMERCIAL

## 2021-01-22 DIAGNOSIS — F31.9 BIPOLAR I DISORDER (H): ICD-10-CM

## 2021-01-22 RX ORDER — CLONAZEPAM 1 MG/1
TABLET ORAL
Qty: 360 TABLET | Refills: 0 | Status: SHIPPED | OUTPATIENT
Start: 2021-01-22 | End: 2021-04-06

## 2021-01-22 RX ORDER — SILDENAFIL 50 MG/1
50 TABLET, FILM COATED ORAL DAILY PRN
Qty: 10 TABLET | Refills: 3 | Status: SHIPPED | OUTPATIENT
Start: 2021-01-22 | End: 2021-06-01

## 2021-01-22 RX ORDER — MIRTAZAPINE 30 MG/1
60 TABLET, FILM COATED ORAL AT BEDTIME
Qty: 180 TABLET | Refills: 1 | Status: SHIPPED | OUTPATIENT
Start: 2021-01-22 | End: 2021-06-10

## 2021-01-25 ENCOUNTER — TELEPHONE (OUTPATIENT)
Dept: INTERNAL MEDICINE | Facility: CLINIC | Age: 51
End: 2021-01-25

## 2021-01-25 ENCOUNTER — MYC MEDICAL ADVICE (OUTPATIENT)
Dept: INTERNAL MEDICINE | Facility: CLINIC | Age: 51
End: 2021-01-25

## 2021-01-25 DIAGNOSIS — G43.809 OTHER MIGRAINE WITHOUT STATUS MIGRAINOSUS, NOT INTRACTABLE: Primary | ICD-10-CM

## 2021-01-25 DIAGNOSIS — G43.719 INTRACTABLE CHRONIC MIGRAINE WITHOUT AURA AND WITHOUT STATUS MIGRAINOSUS: ICD-10-CM

## 2021-01-25 DIAGNOSIS — L30.9 ECZEMA, UNSPECIFIED TYPE: Primary | ICD-10-CM

## 2021-01-25 RX ORDER — CLOBETASOL PROPIONATE 0.5 MG/G
CREAM TOPICAL 2 TIMES DAILY
Qty: 60 G | Refills: 1 | Status: SHIPPED | OUTPATIENT
Start: 2021-01-25 | End: 2021-01-26

## 2021-01-25 NOTE — TELEPHONE ENCOUNTER
Patient calls back wondering if Dr Lincoln could prescribe clobetasol 0.05%, 60 gms. She would like a 90 day supply sent to [facility] on [address] and [facility] in [place]. She also would like to know which neurologist Dr Lincoln wants her to see for her migraine headaches. Call patient back to advise about both. [phone] (home)

## 2021-01-25 NOTE — TELEPHONE ENCOUNTER
See message below. Pended the cream.     Dr Lincoln referred her to Neurology back in September.     [address] [address]   Phone: [phone]

## 2021-01-26 RX ORDER — CLOBETASOL PROPIONATE 0.5 MG/G
CREAM TOPICAL 2 TIMES DAILY
Qty: 60 G | Refills: 1 | Status: SHIPPED | OUTPATIENT
Start: 2021-01-26 | End: 2022-01-01

## 2021-01-28 ENCOUNTER — TELEPHONE (OUTPATIENT)
Dept: INTERNAL MEDICINE | Facility: CLINIC | Age: 51
End: 2021-01-28

## 2021-01-28 NOTE — TELEPHONE ENCOUNTER
Patient states medication she is on for migraine headaches is not working and she would like a different medication from Dr Lincoln until she can see the neurologist on 2/16/21. Please call her back to advise. [phone] (home)

## 2021-01-29 ENCOUNTER — HOSPITAL ENCOUNTER (OUTPATIENT)
Dept: MAMMOGRAPHY | Facility: CLINIC | Age: 51
Discharge: HOME | End: 2021-01-29
Attending: INTERNAL MEDICINE | Admitting: INTERNAL MEDICINE
Payer: COMMERCIAL

## 2021-01-29 DIAGNOSIS — Z12.31 ENCOUNTER FOR SCREENING MAMMOGRAM FOR BREAST CANCER: ICD-10-CM

## 2021-01-29 PROCEDURE — 77063 BREAST TOMOSYNTHESIS BI: CPT

## 2021-02-01 ENCOUNTER — VIRTUAL VISIT (OUTPATIENT)
Dept: PSYCHIATRY | Facility: CLINIC | Age: 51
End: 2021-02-01
Attending: PSYCHIATRY & NEUROLOGY
Payer: COMMERCIAL

## 2021-02-16 ENCOUNTER — TRANSFERRED RECORDS (OUTPATIENT)
Dept: HEALTH INFORMATION MANAGEMENT | Facility: CLINIC | Age: 51
End: 2021-02-16

## 2021-02-17 ENCOUNTER — PATIENT OUTREACH (OUTPATIENT)
Dept: INTERNAL MEDICINE | Facility: CLINIC | Age: 51
End: 2021-02-17

## 2021-02-17 DIAGNOSIS — Z11.59 ENCOUNTER FOR SCREENING FOR OTHER VIRAL DISEASES: ICD-10-CM

## 2021-02-18 ENCOUNTER — MYC MEDICAL ADVICE (OUTPATIENT)
Dept: PSYCHIATRY | Facility: CLINIC | Age: 51
End: 2021-02-18

## 2021-02-18 NOTE — TELEPHONE ENCOUNTER
Jaden Rodriguez MD Labossiere, Laura, RN   Caller: Unspecified (Today,  8:07 AM)   Phone Number: [phone]             Increase Zyprexa by 5 mg.  Probably best in PM.  Can take an extra 5 mg during the day today.  Cut the Remeron dose by 50% until the linda resolves.        Writer called the pt and relayed the above information. She voiced understanding and confirmed she has enough medication to make the above changes. Pt agreed to call the clinic if her symptoms worsen or she needs a refill.

## 2021-02-18 NOTE — TELEPHONE ENCOUNTER
"Writer called pt to gather more information regarding her HiMom message. The pt reports experiencing heightened mood and energy approximately two weeks ago. Since then, she has been engaging in risky and impulsive behaviors. Over the last week, and specifically the last few days, she has noticed even worse symptoms. She's going to bed later and waking up earlier. Pt said, \"I'm very scared. I really need 60 mg of Remeron, but I may need to decrease it.\" Pt shared that Remeron has been extremely effective in treating her depression. She was tearful on the phone and shared that she's feeling sad and emotional, but denies any SI/SIB or safety concerns. Pt confirmed she will call a support person and this clinic if any SI develops. 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 The pt feels all of the stressors in her life have contributed to this episode.    At this time, pt is home alone watching the news and scrapbooking. She will continue to do so until she hears from this writer or provider with recommendations. Agreed to reach out to the provider and will then call the pt back with suggestions.   "

## 2021-02-27 ENCOUNTER — HOSPITAL ENCOUNTER (OUTPATIENT)
Dept: LAB | Facility: CLINIC | Age: 51
Discharge: HOME | End: 2021-02-27
Attending: INTERNAL MEDICINE | Admitting: INTERNAL MEDICINE
Payer: COMMERCIAL

## 2021-02-27 DIAGNOSIS — Z11.59 ENCOUNTER FOR SCREENING FOR OTHER VIRAL DISEASES: ICD-10-CM

## 2021-02-27 LAB
SARS-COV-2 RNA RESP QL NAA+PROBE: NORMAL
SPECIMEN SOURCE: NORMAL

## 2021-02-27 PROCEDURE — U0003 INFECTIOUS AGENT DETECTION BY NUCLEIC ACID (DNA OR RNA); SEVERE ACUTE RESPIRATORY SYNDROME CORONAVIRUS 2 (SARS-COV-2) (CORONAVIRUS DISEASE [COVID-19]), AMPLIFIED PROBE TECHNIQUE, MAKING USE OF HIGH THROUGHPUT TECHNOLOGIES AS DESCRIBED BY CMS-2020-01-R: HCPCS | Performed by: INTERNAL MEDICINE

## 2021-02-27 PROCEDURE — U0005 INFEC AGEN DETEC AMPLI PROBE: HCPCS | Performed by: INTERNAL MEDICINE

## 2021-02-28 LAB
LABORATORY COMMENT REPORT: NORMAL
SARS-COV-2 RNA RESP QL NAA+PROBE: NEGATIVE
SPECIMEN SOURCE: NORMAL

## 2021-03-03 ENCOUNTER — HOSPITAL ENCOUNTER (OUTPATIENT)
Facility: CLINIC | Age: 51
Discharge: HOME | End: 2021-03-03
Attending: INTERNAL MEDICINE | Admitting: INTERNAL MEDICINE
Payer: COMMERCIAL

## 2021-03-03 VITALS
DIASTOLIC BLOOD PRESSURE: 86 MMHG | OXYGEN SATURATION: 97 % | SYSTOLIC BLOOD PRESSURE: 115 MMHG | HEART RATE: 75 BPM | RESPIRATION RATE: 17 BRPM

## 2021-03-03 LAB — COLONOSCOPY: NORMAL

## 2021-03-03 PROCEDURE — G0500 MOD SEDAT ENDO SERVICE >5YRS: HCPCS | Performed by: INTERNAL MEDICINE

## 2021-03-03 PROCEDURE — 88305 TISSUE EXAM BY PATHOLOGIST: CPT | Mod: 26 | Performed by: PATHOLOGY

## 2021-03-03 PROCEDURE — 88305 TISSUE EXAM BY PATHOLOGIST: CPT | Mod: TC | Performed by: INTERNAL MEDICINE

## 2021-03-03 PROCEDURE — 45380 COLONOSCOPY AND BIOPSY: CPT | Performed by: INTERNAL MEDICINE

## 2021-03-03 PROCEDURE — 45385 COLONOSCOPY W/LESION REMOVAL: CPT | Mod: PT | Performed by: INTERNAL MEDICINE

## 2021-03-03 PROCEDURE — 250N000011 HC RX IP 250 OP 636: Performed by: INTERNAL MEDICINE

## 2021-03-03 PROCEDURE — 99153 MOD SED SAME PHYS/QHP EA: CPT | Performed by: INTERNAL MEDICINE

## 2021-03-03 RX ORDER — ONDANSETRON 2 MG/ML
4 INJECTION INTRAMUSCULAR; INTRAVENOUS EVERY 6 HOURS PRN
Status: CANCELLED | OUTPATIENT
Start: 2021-03-03

## 2021-03-03 RX ORDER — FENTANYL CITRATE 50 UG/ML
INJECTION, SOLUTION INTRAMUSCULAR; INTRAVENOUS PRN
Status: DISCONTINUED | OUTPATIENT
Start: 2021-03-03 | End: 2021-03-03 | Stop reason: HOSPADM

## 2021-03-03 RX ORDER — ONDANSETRON 2 MG/ML
4 INJECTION INTRAMUSCULAR; INTRAVENOUS
Status: DISCONTINUED | OUTPATIENT
Start: 2021-03-03 | End: 2021-03-03 | Stop reason: HOSPADM

## 2021-03-03 RX ORDER — ONDANSETRON 4 MG/1
4 TABLET, ORALLY DISINTEGRATING ORAL EVERY 6 HOURS PRN
Status: CANCELLED | OUTPATIENT
Start: 2021-03-03

## 2021-03-03 RX ORDER — NALOXONE HYDROCHLORIDE 0.4 MG/ML
0.4 INJECTION, SOLUTION INTRAMUSCULAR; INTRAVENOUS; SUBCUTANEOUS
Status: CANCELLED | OUTPATIENT
Start: 2021-03-03 | End: 2021-03-04

## 2021-03-03 RX ORDER — NALOXONE HYDROCHLORIDE 0.4 MG/ML
0.2 INJECTION, SOLUTION INTRAMUSCULAR; INTRAVENOUS; SUBCUTANEOUS
Status: CANCELLED | OUTPATIENT
Start: 2021-03-03 | End: 2021-03-04

## 2021-03-03 RX ORDER — LIDOCAINE 40 MG/G
CREAM TOPICAL
Status: DISCONTINUED | OUTPATIENT
Start: 2021-03-03 | End: 2021-03-03 | Stop reason: HOSPADM

## 2021-03-03 NOTE — DISCHARGE INSTRUCTIONS
Understanding Colon and Rectal Polyps     The colon has a smooth lining composed of millions of cells.     The colon (also called the large intestine) is a muscular tube that forms the last part of the digestive tract. It absorbs water and stores food waste. The colon is about 4 to 6 feet long. The rectum is the last 6 inches of the colon. The colon and rectum have a smooth lining composed of millions of cells. Changes in these cells can lead to growths in the colon that can become cancerous and should be removed.     When the Colon Lining Changes  Changes that occur in the cells that line the colon or rectum can lead to growths called polyps. Over a period of years, polyps can turn cancerous. Removing polyps early may prevent cancer from ever forming.      Polyps  Polyps are fleshy clumps of tissue that form on the lining of the colon or rectum. Small polyps are usually benign (not cancerous). However, over time, cells in a polyp can change and become cancerous. The larger a polyp grows, the more likely this is to happen. Also, certain types of polyps known as adenomatous polyps are considered premalignant. This means that they will almost always become cancerous if they re not removed.          Cancer  Almost all colorectal cancers start when polyp cells begin growing abnormally. As a cancerous tumor grows, it may involve more and more of the colon or rectum. In time, cancer can also grow beyond the colon or rectum and spread to nearby organs or to glands called lymph nodes. The cells can also travel to other parts of the body. This is known as metastasis. The earlier a cancerous tumor is removed, the better the chance of preventing its spread.        5958-1474 [facility], [address] [place] [address], [place] [address]. All rights reserved. This information is not intended as a substitute for professional medical care. Always follow your healthcare professional's instructions.      HEMORRHOIDS, External      A  hemorrhoid is a local swelling of the veins around the rectum. These most often occur from repeated forceful straining during bowel movements or heavy lifting. It may also occur in the last few months of pregnancy. A hemorrhoid feels like a soft lump. It may itch from time to time. When it is inflamed it becomes hard and very painful.    HOME CARE:  1. SITZ BATHS: Sit in a tub filled with about 6 inches of hot water. Allow the water to run in order to keep it hot for a total of 10-15 minutes. Repeat this three times a day until pain is relieved.  2. Keep your stools soft to avoid the need to strain when having a bowel movement. Unless another medicine was prescribed, try the following:  IF YOU ARE CONSTIPATED: You may use over-the-counter laxatives such as MILK OF MAGNESIA (mild acting) or, DULCOLAX (if stronger action is needed).  IF YOU ARE NOT CONSTIPATED but stools are hard, try taking Colace (docusate sodium) which is a stool softener. This will soften stools without producing diarrhea. Drinking extra fluids may also help.  3. The use of creams applied to the hemorrhoid itself, such as ANUSOL or PREPARATION H, will be helpful to reduce pain and itching, and speed healing.    PREVENTION:  Avoid straining on the toilet by keeping stools soft. Increasing FIBER in your diet (fruits, cereals, vegetables and grains) will promote healthy bowel movement. If this is not working, you may use METAMUCIL and similar products. These are over-the-counter fiber supplements. You must drink extra fluids when taking these to avoid constipation.  FOLLOW UP with your doctor if you do not begin to respond to the above treatment within the next few days.    GET PROMPT MEDICAL ATTENTION if any of the following occur:      Large amount of rectal bleeding (more than 1 cup of blood in 24 hours)    Increasing rectal pain or rectal pain that continues for more than three days of treatment    Weakness, dizziness or fainting    Vomiting  blood (red or black color)          [id number] [facility], [address] [place] [address], [place] [address]. All rights reserved. This information is not intended as a substitute for professional medical care. Always follow your healthcare professional's instructions.

## 2021-03-03 NOTE — H&P
Pre-Endoscopy History and Physical     Ana Laura Wharton MRN# [id number]   YOB: 1970 Age: 50 year old     Date of Procedure: 3/3/2021  Primary care provider: Liborio Lincoln  Type of Endoscopy: colonoscopy  Reason for Procedure: screening  Type of Anesthesia Anticipated: Conscious Sedation    HPI:    Ana Laura is a 50 year old female who will be undergoing the above procedure.      A history and physical has been performed. The patient's medications and allergies have been reviewed. The risks and benefits of the procedure and the sedation options and risks were discussed with the patient.  All questions were answered and informed consent was obtained.      She denies a personal or family history of anesthesia complications or bleeding disorders.     Patient Active Problem List   Diagnosis     Depressed bipolar I disorder in full remission (H)     Anxiety disorder     Suicide ideation     Bipolar disorder current episode depressed (H)     Overdose     Depression     Mood disorder (H)     Suicidal ideations     Bipolar I disorder (H)     Acute renal failure (H)     Alternating exotropia     Anxiety state     Other bipolar disorders     Personality disorder (H)     Tear film insufficiency     Dysfunctions associated with sleep stages or arousal from sleep     Abnormal finding of blood chemistry     Esophageal reflux     Other specified hypothyroidism     Impulse control disorder     Acidosis     Low back pain     Myopia     Opioid dependence (H)     Orofacial dyskinesia     Other migraine without status migrainosus, not intractable     Pure hypercholesterolemia     Poisoning by 4-aminophenol derivatives, undetermined intent     Essential hypertension     Left knee pain     Aftercare following surgery of the musculoskeletal system     UTI (urinary tract infection)     Acute cystitis     Clostridium difficile colitis     Drug overdose     Calcium channel blocker overdose     Intentional acetaminophen  overdose (H)     Alcoholic intoxication with complication (H)     Hypotension due to medication     Acute renal failure, unspecified acute renal failure type (H)     Paroxysmal atrial fibrillation (H)     Acute respiratory failure with hypoxia (H)     Acute pulmonary edema (H)     Spontaneous pneumothorax     PRES (posterior reversible encephalopathy syndrome)     Pancreatitis     Atypical squamous cells cannot exclude high grade squamous intraepithelial lesion on cytologic smear of cervix (ASC-H)     Alcohol withdrawal syndrome without complication (H)        Past Medical History:   Diagnosis Date     Abnormal Pap smear of cervix 01/09/2019    see problem list     Anxiety      Bipolar disorder (H)      C. difficile colitis      Cervical high risk HPV (human papillomavirus) test positive 01/09/2019 & 2/19/2020    see problem list     Depressive disorder      Essential hypertension 7/8/2015     Gastro-oesophageal reflux disease      History of colposcopy 03/03/2020    see problem list     Hypothyroidism 4/1/2015     Migraine      Spontaneous pneumothorax     x3, resolved w/ pleurodesis      Suicide attempt (H)         Past Surgical History:   Procedure Laterality Date     ARTHROSCOPY KNEE      L knee     CERVIX SURGERY      for pre-cancerous changes     left knee surgery       ORTHOPEDIC SURGERY      L shoulder     THORACIC SURGERY      for pneumothorax, pleurodesis and lobe resection       Relevant Family History: NONE    Relevant Social History: NONE     Prior to Admission medications    Medication Sig Start Date End Date Taking? Authorizing Provider   atorvastatin (LIPITOR) 10 MG tablet TAKE 1 TABLET EVERY DAY 1/18/21  Yes Liborio Lincoln MD   clonazePAM (KLONOPIN) 1 MG tablet Take 1 tablet (1 mg) by mouth 2 times daily AND 2 tablets (2 mg) At Bedtime. 1/22/21  Yes Jaden Rodriguez MD   folic acid (FOLVITE) 400 MCG tablet Take 400 mcg by mouth daily   Yes Unknown, Entered By History   hydrOXYzine (VISTARIL)  50 MG capsule Take 1 capsule (50 mg) by mouth 2 times daily. May also take 1 capsule (50 mg) daily as needed for anxiety. 1/15/21  Yes Jaden Rodriguez MD   levothyroxine (SYNTHROID/LEVOTHROID) 50 MCG tablet TAKE 1 TABLET EVERY DAY 1/18/21  Yes Liborio Lincoln MD   acetaminophen (TYLENOL) 500 MG tablet Take 1-2 tablets (500-1,000 mg) by mouth every 6 hours as needed for mild pain 9/8/20   Liborio Lincoln MD   albuterol (PROAIR HFA/PROVENTIL HFA/VENTOLIN HFA) 108 (90 Base) MCG/ACT inhaler Inhale 2 puffs into the lungs every 6 hours as needed for shortness of breath / dyspnea or wheezing 10/10/18   Liborio Lincoln MD   amLODIPine (NORVASC) 5 MG tablet Take 1 tablet (5 mg) by mouth daily 1/13/20   Liborio Lincoln MD   carvedilol (COREG) 12.5 MG tablet TAKE 1 TABLET TWICE DAILY WITH MEALS 1/18/21   Liborio Lincoln MD   clindamycin (CLEOCIN T) 1 % external lotion Apply topically daily 1/18/21   Liborio Lincoln MD   clobetasol (TEMOVATE) 0.05 % external cream Apply topically 2 times daily 1/26/21   Liborio Lincoln MD   cyclobenzaprine (FLEXERIL) 10 MG tablet Take 1 tablet (10 mg) by mouth At Bedtime 1/18/21   Liborio Lincoln MD   Emollient (HYDROPHOR EX) Externally apply topically At Bedtime    Reported, Patient   Emollient (HYDROPHOR) OINT Externally apply topically daily 1/18/21   Liborio Lincoln MD   fluticasone (FLONASE) 50 MCG/ACT nasal spray SHAKE LIQUID AND USE 1 SPRAY IN EACH NOSTRIL DAILY 7/21/20   Liborio Lincoln MD   ketorolac (TORADOL) 30 MG/ML injection every 7 days 9/8/20   Reported, Patient   mirtazapine (REMERON) 30 MG tablet Take 2 tablets (60 mg) by mouth At Bedtime  Patient taking differently: Take 30 mg by mouth At Bedtime  1/22/21   Jaden Rodriguez MD   mometasone (ELOCON) 0.1 % external cream Apply topically daily 1/18/21   Liborio Lincoln MD   Multiple Vitamins-Minerals (WOMENS MULTI VITAMIN & MINERAL PO) Take 1 tablet by mouth daily    Unknown,  "Entered By History   norethindrone (MICRONOR) 0.35 MG tablet Take 1 tablet (0.35 mg) by mouth daily continuously 1/6/21   Celso Mcwilliams MD   OLANZapine (ZYPREXA) 10 MG tablet Take 1 tablet (10 mg) by mouth At Bedtime  Patient taking differently: Take 20 mg by mouth At Bedtime  1/25/21   Jaden Rodriguez MD   OLANZapine (ZYPREXA) 5 MG tablet Take 1 tab (5 mg) every morning and 1 tab (5 mg) at bedtime with 10 mg tab  Patient taking differently: Take 1 tab (5 mg) every morning 1/15/21   Jaden Rodriguez MD   ondansetron (ZOFRAN) 4 MG tablet Take 1 tablet (4 mg) by mouth every 8 hours as needed for nausea 6/24/19   Liborio Lincoln MD   pantoprazole (PROTONIX) 40 MG EC tablet Take 1 tablet (40 mg) by mouth 2 times daily 1/18/21   Liborio Lincoln MD   polyethylene glycol (MIRALAX) 17 g packet Take 1 packet by mouth daily as needed for constipation    Unknown, Entered By History   Sennosides-Docusate Sodium (SENNA S PO)     Reported, Patient   sildenafil (VIAGRA) 50 MG tablet Take 1 tablet (50 mg) by mouth daily as needed (sexual functioning) 1/22/21   Jaden Rodriguez MD   sodium chloride (OCEAN) 0.65 % nasal spray Use in both nostrils 3 times a day. 1/13/20   Liborio Lincoln MD   Syringe/Needle, Disp, (SYRINGE LUER LOCK) 25G X 1\" 3 ML MISC 1 Act once a week 12/8/20   Liborio Lincoln MD       Allergies   Allergen Reactions     Ciprofloxacin Other (See Comments)     Joint pain cdiff     Compazine [Prochlorperazine] Other (See Comments)     dystonia     Metoclopramide Other (See Comments)     \"I feel like I am crawling out of my skin\"     Reglan [Metoclopramide Hcl] Other (See Comments)     Sensation of \"crawling out of skin\"     Restoril [Temazepam]      Thorazine [Chlorpromazine] Other (See Comments)     dystonia     Abilify [Aripiprazole] Rash     Lamotrigine Rash     Severe drug rash - contraindication to receiving again. Skin peeling.         REVIEW OF SYSTEMS:   A relevant review of " "systems was performed and was negative    PHYSICAL EXAM:   /76   Pulse 75   Resp 19   SpO2 96%  Estimated body mass index is 20.32 kg/m  as calculated from the following:    Height as of 1/18/21: 1.664 m (5' 5.5\").    Weight as of 1/18/21: 56.2 kg (124 lb).   GENERAL APPEARANCE: alert, and oriented  MENTAL STATUS: alert  AIRWAY EXAM: Normal  RESP: lungs clear to auscultation - no rales, rhonchi or wheezes  CV: regular rates and rhythm  DIAGNOSTICS:    Not indicated    IMPRESSION   ASA Class 2 - Mild systemic disease    PLAN:   Plan for colonoscopy. We discussed the risks, benefits and alternatives and the patient wished to proceed.      Signed Electronically by: Duane Connell MD  March 3, 2021            "

## 2021-03-03 NOTE — LETTER
February 3, 2021      Ana Laura Wharton  [address]   [place] [id number]        Dear Ana Laura,     Please be aware that coverage of these services is subject to the terms and limitations of your health insurance plan.  Call member services at your health plan with any benefit or coverage questions.    Thank you for choosing [facility]. You are scheduled for the following service(s):    Date:  Wednesday March 3, 2021            Procedure:  COLONOSCOPY  Doctor:        Duane Connell MD   Arrival Time:  11:00am *Enter and check in at the Main Hospital Entrance*  Procedure Time:  11:30am      Location:   [facility]        Endoscopy Department, First Floor         [address] [place], [place] [id number] [phone] or [phone] ([facility]) to reschedule        MIRALAX -GATORADE  PREP  Colonoscopy is the most accurate test to detect colon polyps and colon cancer; and the only test where polyps can be removed. During this procedure, a doctor examines the lining of your large intestine and rectum through a flexible tube.   Transportation  You must arrange for a ride for the day of your procedure with a responsible adult. A taxi , Uber, etc, is not an option unless you are accompanied by a responsible adult. If you fail to arrange transportation with a responsible adult, your procedure will be cancelled and rescheduled.    Purchase the  following supplies at your local pharmacy:  - 2 (two) bisacodyl tablets: each tablet contains 5 mg.  (Dulcolax  laxative NOT Dulcolax  stool softener)   - 1 (one) 8.3 oz bottle of Polyethylene Glycol (PEG) 3350 Powder   (MiraLAX , Smooth LAX , ClearLAX  or equivalent)  - 64 oz Gatorade    Regular Gatorade, Gatorade G2 , Powerade , Powerade Zero  or Pedialyte  is acceptable. Red colored flavors are not allowed; all other colors (yellow, green, orange, purple and blue) are okay. It is also okay to buy two 2.12 oz  packets of powdered Gatorade that can be mixed with water to a total volume of 64 oz of liquid.  - 1 (one) 10 oz bottle of Magnesium Citrate (Red colored flavors are not allowed)  It is also okay for you to use a 0.5 oz package of powdered magnesium citrate (17 g) mixed with 10 oz of water.      PREPARATION FOR COLONOSCOPY    7 days before:    Discontinue fiber supplements and medications containing iron. This includes Metamucil  and Fibercon ; and multivitamins with iron.    3 days before:    Begin a low-fiber diet. A low-fiber diet helps making the cleanout more effective.     Examples of a low-fiber diet include (but are not limited to): white bread, white rice, pasta, crackers, fish, chicken, eggs, ground beef, creamy peanut butter, cooked/steamed/boiled vegetables, canned fruit, bananas, melons, milk, plain yogurt cheese, salad dressing and other condiments.     The following are not allowed on a low-fiber diet: seeds, nuts, popcorn, bran, whole wheat, corn, quinoa, raw fruits and vegetables, berries and dried fruit, beans and lentils.    For additional details on low-fiber diet, please refer to the table on the last page.    2 days before:    Continue the low-fiber diet.     Drink at least 8 glasses of water throughout the day.     Stop eating solid foods at 11:45 pm.    1 day before:    In the morning: begin a clear liquid diet (liquids you can see through).     Examples of a clear liquid diet include: water, clear broth or bouillon, Gatorade, Pedialyte or Powerade, carbonated and non-carbonated soft drinks (Sprite , 7-Up , ginger ale), strained fruit juices without pulp (apple, white grape, white cranberry), Jell-O  and popsicles.     The following are not allowed on a clear liquid diet: red liquids, alcoholic beverages, dairy products (milk, creamer, and yogurt), protein shakes, creamy broths, juice with pulp and chewing tobacco.    At noon: take 2 (two) bisacodyl tablets     At 4 (and no later than 6pm):  start drinking the Miralax-Gatorade preparation (8.3 oz of Miralax mixed with 64 oz of Gatorade in a large pitcher). Drink 1(one) 8 oz glass every 15 minutes thereafter, until the mixture is gone.    COLON CLEANSING TIPS: drink adequate amounts of fluids before and after your colon cleansing to prevent dehydration. Stay near a toilet because you will have diarrhea. Even if you are sitting on the toilet, continue to drink the cleansing solution every 15 minutes. If you feel nauseous or vomit, rinse your mouth with water, take a 15 to 30-minute-break and then continue drinking the solution. You will be uncomfortable until the stool has flushed from your colon (in about 2 to 4 hours). You may feel chilled.    Day of your procedure  You may take all of your morning medications including blood pressure medications, blood thinners (if you have not been instructed to stop these by our office), methadone, anti-seizure medications with sips of water 3 hours prior to your procedure or earlier. Do not take insulin or vitamins prior to your procedure. Continue the clear liquid diet.       4 hours prior: drink 10 oz of magnesium citrate. It may be easier to drink it with a straw.    STOP consuming all liquids after that.     Do not take anything by mouth during this time.     Allow extra time to travel to your procedure as you may need to stop and use a restroom along the way.    You are ready for the procedure, if you followed all instructions and your stool is no longer formed, but clear or yellow liquid. If you are unsure whether your colon is clean, please call our office at [phone] before you leave for your appointment.    Bring the following to your procedure:  - Insurance Card/Photo ID.   - List of current medications including over-the-counter medications and supplements.   - Your rescue inhaler if you currently use one to control asthma.    Canceling or rescheduling your appointment:   If you must cancel or  reschedule your appointment, please call [phone] as soon as possible.      COLONOSCOPY PRE-PROCEDURE CHECKLIST    If you have diabetes, ask your regular doctor for diet and medication restrictions.  If you take an anticoagulant or anti-platelet medication (such as Coumadin , Lovenox , Pradaxa , Xarelto , Eliquis , etc.), please call your primary doctor for advice on holding this medication.  If you take aspirin you may continue to do so.  If you are or may be pregnant, please discuss the risks and benefits of this procedure with your doctor.        What happens during a colonoscopy?    Plan to spend up to two hours, starting at registration time, at the endoscopy center the day of your procedure. The colonoscopy takes an average of 15 to 30 minutes. Recovery time is about 30 minutes.      Before the exam:    You will change into a gown.    Your medical history and medication list will be reviewed with you, unless that has been done over the phone prior to the procedure.     A nurse will insert an intravenous (IV) line into your hand or arm.    The doctor will meet with you and will give you a consent form to sign.  During the exam:     Medicine will be given through the IV line to help you relax.     Your heart rate and oxygen levels will be monitored. If your blood pressure is low, you may be given fluids through the IV line.     The doctor will insert a flexible hollow tube, called a colonoscope, into your rectum. The scope will be advanced slowly through the large intestine (colon).    You may have a feeling of fullness or pressure.     If an abnormal tissue or a polyp is found, the doctor may remove it through the endoscope for closer examination, or biopsy. Tissue removal is painless    After the exam:           Any tissue samples removed during the exam will be sent to a lab for evaluation. It may take 5-7 working days for you to be notified of the results.     A nurse will provide you with complete  discharge instructions before you leave the endoscopy center. Be sure to ask the nurse for specific instructions if you take blood thinners such as Aspirin, Coumadin or Plavix.     The doctor will prepare a full report for you and for the physician who referred you for the procedure.     Your doctor will talk with you about the initial results of your exam.      Medication given during the exam will prohibit you from driving for the rest of the day.     Following the exam, you may resume your normal diet. Your first meal should be light, no greasy foods. Avoid alcohol until the next day.     You may resume your regular activities the day after the procedure.         LOW-FIBER DIET    Foods RECOMMENDED Foods to AVOID   Breads, Cereal, Rice and Pasta:   White bread, rolls, biscuits, croissant and gregory toast.   Waffles, Kosovan toast and pancakes.   White rice, noodles, pasta, macaroni and peeled cooked potatoes.   Plain crackers and saltines.   Cooked cereals: farina, cream of rice.   Cold cereals: Puffed Rice , Rice Krispies , Corn Flakes  and Special K    Breads, Cereal, Rice and Pasta:   Breads or rolls with nuts, seeds or fruit.   Whole wheat, pumpernickel, rye breads and cornbread.   Potatoes with skin, brown or wild rice, and kasha (buckwheat).     Vegetables:   Tender cooked and canned vegetables without seeds: carrots, asparagus tips, green or wax beans, pumpkin, spinach, lima beans. Vegetables:   Raw or steamed vegetables.   Vegetables with seeds.   Sauerkraut.   Winter squash, peas, broccoli, Brussel sprouts, cabbage, onions, cauliflower, baked beans, peas and corn.   Fruits:   Strained fruit juice.   Canned fruit, except pineapple.   Ripe bananas and melon. Fruits:   Prunes and prune juice.   Raw fruits.   Dried fruits: figs, dates and raisins.   Milk/Dairy:   Milk: plain or flavored.   Yogurt, custard and ice cream.   Cheese and cottage cheese Milk/Dairy:     Meat and other proteins:   ground, well-cooked  tender beef, lamb, ham, veal, pork, fish, poultry and organ meats.   Eggs.   Peanut butter without nuts. Meat and other proteins:   Tough, fibrous meats with gristle.   Dry beans, peas and lentils.   Peanut butter with nuts.   Tofu.   Fats, Snack, Sweets, Condiments and Beverages:   Margarine, butter, oils, mayonnaise, sour cream and salad dressing, plain gravy.   Sugar, hard candy, clear jelly, honey and syrup.   Spices, cooked herbs, bouillon, broth and soups made with allowed vegetable, ketchup and mustard.   Coffee, tea and carbonated drinks.   Plain cakes, cookies and pretzels.   Gelatin, plain puddings, custard, ice cream, sherbet and popsicles. Fats, Snack, Sweets, Condiments and Beverages:   Nuts, seeds and coconut.   Jam, marmalade and preserves.   Pickles, olives, relish and horseradish.   All desserts containing nuts, seeds, dried fruit and coconut; or made from whole grains or bran.   Candy made with nuts or seeds.   Popcorn.         DIRECTIONS TO THE ENDOSCOPY DEPARTMENT    From the north ([facility])  Take [address], exit on [address] [address]. Get into the left hand latonya, turn left (east), go one-half mile to [address] and turn left. Go north to the second stoplight, take a right on [address] and follow it to the Main Hospital entrance.    From the south ([facility])  Take [address]N to the [address]E split and exit on [address] [address]. On [address] [address], turn left (west) to [address]. Turn right (north) on [address]. Go north to the second stoplight, take a right on [address] and follow it to the Main Hospital entrance.    From the east via 35E ([facility])  Take [address] south to [address] [address] exit. Turn right on [address] [address]. Go west to [address]. Turn right (north) on [address]. Go to the second stoplight, take a right on [address] to the Main Hospital entrance.    From the east via [address] ([facility])  [address] to [address]. Turn left (south) on [address] to [address], turn left (east) on [address] and follow it to the Main Hospital entrance.    From the west via [address] ([place])  Take [address] east to [address]. Turn right (south) on [address] to [address], turn left (east) on [address] and follow it to the Main Hospital entrance.

## 2021-03-04 LAB — COPATH REPORT: NORMAL

## 2021-03-08 ENCOUNTER — MYC MEDICAL ADVICE (OUTPATIENT)
Dept: INTERNAL MEDICINE | Facility: CLINIC | Age: 51
End: 2021-03-08

## 2021-03-08 ENCOUNTER — TELEPHONE (OUTPATIENT)
Dept: INTERNAL MEDICINE | Facility: CLINIC | Age: 51
End: 2021-03-08

## 2021-03-08 DIAGNOSIS — N30.00 ACUTE CYSTITIS WITHOUT HEMATURIA: Primary | ICD-10-CM

## 2021-03-08 DIAGNOSIS — R30.0 DYSURIA: Primary | ICD-10-CM

## 2021-03-08 DIAGNOSIS — R30.0 DYSURIA: ICD-10-CM

## 2021-03-08 LAB
BACTERIA #/AREA URNS HPF: ABNORMAL /HPF
NON-SQ EPI CELLS #/AREA URNS LPF: ABNORMAL /LPF
RBC #/AREA URNS AUTO: ABNORMAL /HPF
WBC #/AREA URNS AUTO: ABNORMAL /HPF

## 2021-03-08 PROCEDURE — 87086 URINE CULTURE/COLONY COUNT: CPT | Performed by: INTERNAL MEDICINE

## 2021-03-08 PROCEDURE — 87186 SC STD MICRODIL/AGAR DIL: CPT | Performed by: INTERNAL MEDICINE

## 2021-03-08 PROCEDURE — 87088 URINE BACTERIA CULTURE: CPT | Performed by: INTERNAL MEDICINE

## 2021-03-08 PROCEDURE — 81015 MICROSCOPIC EXAM OF URINE: CPT | Performed by: INTERNAL MEDICINE

## 2021-03-08 NOTE — TELEPHONE ENCOUNTER
Patient calls asking to drop off a urine sample.  Patient has had symptoms for two days. Burning, pain, frequency, and cloudy urine.  Patient wants to remind PCP that she can not take Cipro.  Please advise as soon as possible per patient because she is in sever pain.

## 2021-03-09 ENCOUNTER — TELEPHONE (OUTPATIENT)
Dept: INTERNAL MEDICINE | Facility: CLINIC | Age: 51
End: 2021-03-09

## 2021-03-09 RX ORDER — SULFAMETHOXAZOLE/TRIMETHOPRIM 800-160 MG
1 TABLET ORAL 2 TIMES DAILY
Qty: 14 TABLET | Refills: 0 | Status: SHIPPED | OUTPATIENT
Start: 2021-03-09 | End: 2021-04-08 | Stop reason: ALTCHOICE

## 2021-03-09 NOTE — TELEPHONE ENCOUNTER
See her new message. Would she be eligible with the new guidelines for the vaccine?       *Underlying Health Conditions  Active cancer  Chronic kidney disease  COPD (chronic obstructive pulmonary disease)  Diabetes - Type 1 or 2  Down Syndrome  Heart conditions, such as heart failure, coronary artery disease, or cardiomyopathies  Immunocompromised state (weakened immune system) from solid organ transplant, HIV, bone marrow disease, chronic steroids for more than 30 days, immunodeficiency disease, or taking immunosuppressive medications  Obesity - body mass index (BMI) greater than 30 kg/m2   Pregnancy  Sickle cell disease    People with specific underlying health conditions:   Sickle cell disease, Down Syndrome, or oxygen-dependent chronic lung or heart conditions, and those who are in active cancer treatment or immunocompromised from organ transplant    Targeted essential workers   Food processing plants    People with rare conditions or disabilities that put them at higher risk  People age 45 and older with ONE or more underlying medical conditions; or, age 16 and over with TWO or more underlying medical conditions*  People 50+ in multi-generational housing

## 2021-03-09 NOTE — TELEPHONE ENCOUNTER
Pt is also calling. She doesn't want to wait for the Urine Culture to start on Antibiotics.     Please advise

## 2021-03-09 NOTE — TELEPHONE ENCOUNTER
Patient calling. She wants to start antibiotic for UTI and not wait for the culture. Please advise. Patient uses [facility] in [place]. Ok to call and lm [phone]

## 2021-03-10 ENCOUNTER — OFFICE VISIT (OUTPATIENT)
Dept: OBGYN | Facility: CLINIC | Age: 51
End: 2021-03-10
Payer: COMMERCIAL

## 2021-03-10 VITALS — DIASTOLIC BLOOD PRESSURE: 80 MMHG | WEIGHT: 130 LBS | BODY MASS INDEX: 21.3 KG/M2 | SYSTOLIC BLOOD PRESSURE: 136 MMHG

## 2021-03-10 DIAGNOSIS — B97.7 HUMAN PAPILLOMA VIRUS (HPV) INFECTION: Primary | ICD-10-CM

## 2021-03-10 DIAGNOSIS — R87.611 ATYPICAL SQUAMOUS CELLS CANNOT EXCLUDE HIGH GRADE SQUAMOUS INTRAEPITHELIAL LESION ON CYTOLOGIC SMEAR OF CERVIX (ASC-H): ICD-10-CM

## 2021-03-10 PROCEDURE — 87624 HPV HI-RISK TYP POOLED RSLT: CPT | Performed by: OBSTETRICS & GYNECOLOGY

## 2021-03-10 PROCEDURE — 99213 OFFICE O/P EST LOW 20 MIN: CPT | Performed by: OBSTETRICS & GYNECOLOGY

## 2021-03-10 NOTE — PROGRESS NOTES
"Chief Complaint   Patient presents with     Repeat Pap Smear       Subjective:  Colpo and biopsies 3/3/2020 - focal atypia suggestive of MICHELLE 1    Here for repeat Pap today      REVIEW OF SYSTEMS:  neg    Health Maintenance   Topic Date Due     MIGRAINE ACTION PLAN  Never done     ZOSTER IMMUNIZATION (1 of 2) 10/14/2020     PAP FOLLOW-UP  03/03/2021     HPV FOLLOW-UP  03/03/2021     PHQ-9  03/30/2021 (Originally 7/10/2020)     MEDICARE ANNUAL WELLNESS VISIT  01/18/2022     MAMMO SCREENING  01/29/2022     LIPID  01/18/2026     ADVANCE CARE PLANNING  01/20/2026     DTAP/TDAP/TD IMMUNIZATION (6 - Td) 07/22/2027     COLORECTAL CANCER SCREENING  03/03/2031     Pneumococcal Vaccine: Pediatrics (0 to 5 Years) and At-Risk Patients (6 to 64 Years) (2 of 2) 10/14/2035     HEPATITIS C SCREENING  Completed     HIV SCREENING  Completed     INFLUENZA VACCINE  Completed     HEPATITIS B IMMUNIZATION  Completed     IPV IMMUNIZATION  Aged Out     MENINGITIS IMMUNIZATION  Aged Out       Allergies   Allergen Reactions     Ciprofloxacin Other (See Comments)     Joint pain cdiff     Compazine [Prochlorperazine] Other (See Comments)     dystonia     Metoclopramide Other (See Comments)     \"I feel like I am crawling out of my skin\"     Reglan [Metoclopramide Hcl] Other (See Comments)     Sensation of \"crawling out of skin\"     Restoril [Temazepam]      Thorazine [Chlorpromazine] Other (See Comments)     dystonia     Abilify [Aripiprazole] Rash     Lamotrigine Rash     Severe drug rash - contraindication to receiving again. Skin peeling.        Objective:  Vitals: /80   Wt 59 kg (130 lb)   BMI 21.30 kg/m    BMI= Body mass index is 21.3 kg/m .    Pelvic:    EGBUS WNL  Vagina neg  Cervix multip w/o lesions.  Pap/HPV obtained    Assessment/Plan:  1. Human papilloma virus (HPV) infection    - Pap imaged thin layer diagnostic with HPV (select HPV order below)  - HPV High Risk Types DNA Cervical    2. Atypical squamous cells cannot exclude " high grade squamous intraepithelial lesion on cytologic smear of cervix (ASC-H)  - Pap imaged thin layer diagnostic with HPV (select HPV order below)  - HPV High Risk Types DNA Cervical        Kevin MD [place]

## 2021-03-11 LAB
BACTERIA SPEC CULT: ABNORMAL
Lab: ABNORMAL
SPECIMEN SOURCE: ABNORMAL

## 2021-03-15 ENCOUNTER — NURSE TRIAGE (OUTPATIENT)
Dept: NURSING | Facility: CLINIC | Age: 51
End: 2021-03-15

## 2021-03-15 ENCOUNTER — TELEPHONE (OUTPATIENT)
Dept: INTERNAL MEDICINE | Facility: CLINIC | Age: 51
End: 2021-03-15

## 2021-03-15 LAB
COPATH REPORT: NORMAL
PAP: NORMAL
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
## 2021-03-17 ENCOUNTER — PATIENT OUTREACH (OUTPATIENT)
Dept: OBGYN | Facility: CLINIC | Age: 51
End: 2021-03-17

## 2021-03-17 NOTE — TELEPHONE ENCOUNTER
[id number] NIL paps  [id number] NIL paps  5/4/15 NIL pap, Neg HPV  1/9/19 ASC-H, LSIL/ + HR HPV (not 16/18). Plan: colpo  1/23/19 Colpo: Bx/ECC-MICHELLE 1. Plan: cotest in 1 year  2/19/2020 NIL pap, +HR HPV (not 16 or 18). Plan colp due by 5/19/2020  3/3/20 [place] ECC: MICHELLE 1. Plan 1 year cotest  03/10/21 NIL pap, + HR HPV (not 16/18). Plan colp bef 6/10/21

## 2021-03-23 ENCOUNTER — IMMUNIZATION (OUTPATIENT)
Dept: NURSING | Facility: CLINIC | Age: 51
End: 2021-03-23
Payer: COMMERCIAL

## 2021-03-23 PROCEDURE — 91300 PR COVID VAC PFIZER DIL RECON 30 MCG/0.3 ML IM: CPT

## 2021-03-23 PROCEDURE — 0001A PR COVID VAC PFIZER DIL RECON 30 MCG/0.3 ML IM: CPT

## 2021-04-01 ENCOUNTER — TELEPHONE (OUTPATIENT)
Dept: PSYCHIATRY | Facility: CLINIC | Age: 51
End: 2021-04-01

## 2021-04-01 NOTE — TELEPHONE ENCOUNTER
"Writer received incoming phone call from the pt. She was immediately tearful on the phone and said she's in major distress. The pt explained that she's feeling \"utterly overwhelmed with life and responsibilities.\" Pt believes she's in a mixed state. She said she stays up late, but sleeps for a long period of time. She reports engaging in risky/impulsive behaviors, similar to past episodes. The pt denies SI or any safety concerns and does not feel she needs to be hospitalized at this time. She's currently alone, but said a friend will be visiting her at 1 pm.    Pt is asking to speak with Dr. Rodriguez directly before her appt at 3 pm this afternoon. Pt contracted for safety multiple times and was okay with writer terminating the phone call to connect with Dr. Rodriguez.  "

## 2021-04-01 NOTE — TELEPHONE ENCOUNTER
Jaden Rodriguez MD Labossiere, Laura, RN   Caller: Unspecified (Today,  9:02 AM)             I spoke with her and will speak again this afternoon.

## 2021-04-02 ENCOUNTER — TELEPHONE (OUTPATIENT)
Dept: INTERNAL MEDICINE | Facility: CLINIC | Age: 51
End: 2021-04-02

## 2021-04-02 DIAGNOSIS — R82.90 NONSPECIFIC FINDING ON EXAMINATION OF URINE: Primary | ICD-10-CM

## 2021-04-02 DIAGNOSIS — R30.0 DYSURIA: Primary | ICD-10-CM

## 2021-04-02 DIAGNOSIS — R30.0 DYSURIA: ICD-10-CM

## 2021-04-02 PROCEDURE — 87088 URINE BACTERIA CULTURE: CPT | Performed by: INTERNAL MEDICINE

## 2021-04-02 PROCEDURE — 87086 URINE CULTURE/COLONY COUNT: CPT | Performed by: INTERNAL MEDICINE

## 2021-04-02 PROCEDURE — 81001 URINALYSIS AUTO W/SCOPE: CPT | Performed by: INTERNAL MEDICINE

## 2021-04-02 RX ORDER — SULFAMETHOXAZOLE/TRIMETHOPRIM 800-160 MG
1 TABLET ORAL 2 TIMES DAILY
Qty: 14 TABLET | Refills: 0 | Status: SHIPPED | OUTPATIENT
Start: 2021-04-02 | End: 2021-01-01

## 2021-04-02 NOTE — TELEPHONE ENCOUNTER
Call to pt and advised. She will bring in sample today.   Will keep open to monitor for lab result..

## 2021-04-02 NOTE — TELEPHONE ENCOUNTER
Pt calling as she has sxs of a UTI since last night.  States she gets these frequently and would like to have orders placed to leave a urine sample in the lab today.  Please advise.  Last had one in early March.  Can be reached at [phone].  OK to leave detailed message.  Lolita Eddy

## 2021-04-03 LAB
BACTERIA SPEC CULT: ABNORMAL
Lab: ABNORMAL
SPECIMEN SOURCE: ABNORMAL

## 2021-04-05 ENCOUNTER — TELEPHONE (OUTPATIENT)
Dept: INTERNAL MEDICINE | Facility: CLINIC | Age: 51
End: 2021-04-05

## 2021-04-05 DIAGNOSIS — N30.00 ACUTE CYSTITIS WITHOUT HEMATURIA: Primary | ICD-10-CM

## 2021-04-05 DIAGNOSIS — F31.9 BIPOLAR I DISORDER (H): ICD-10-CM

## 2021-04-05 RX ORDER — CEPHALEXIN 500 MG/1
500 CAPSULE ORAL 3 TIMES DAILY
Qty: 21 CAPSULE | Refills: 0 | Status: SHIPPED | OUTPATIENT
Start: 2021-04-05 | End: 2021-06-01

## 2021-04-05 RX ORDER — AMPICILLIN TRIHYDRATE 500 MG
500 CAPSULE ORAL 4 TIMES DAILY
Qty: 28 CAPSULE | Refills: 0 | Status: SHIPPED | OUTPATIENT
Start: 2021-04-05 | End: 2021-06-01

## 2021-04-05 NOTE — TELEPHONE ENCOUNTER
Sly Herbert, RN  Hattie Hare, KISHA   Phone Number: [phone]          Previous Messages    ----- Message -----   From: Dalila Seth   Sent: 4/5/2021   9:20 AM CDT   [place] [facility] [place] Questions and Refills - [place]               [facility]     Phone Message     May a detailed message be left on voicemail: yes     Reason for Call: Medication Refill Request     Has the patient contacted the pharmacy for the refill? Yes     Name of medication being requested: Flexeril - WANTING TO INCREASE DOSE   Provider who prescribed the medication: Rittberg   Pharmacy: [facility] in [place]   Date medication is needed: next refill date     Name of medication being requested: clonazepam   Provider who prescribed the medication: Rittberg   Pharmacy: [facility]   Date medication is needed: next refill date - last refill 01/21     Name of medication being requested: Remeron   Provider who prescribed the medication: Rittberg   Pharmacy: [facility]   Date medication is needed: next refill date         Action Taken: Message routed to:  Other: nursing     Travel Screening: Not Applicable

## 2021-04-05 NOTE — TELEPHONE ENCOUNTER
"Received call from pharmacy staff at [facility] stating they do not have Ampicillin of any strength in stock and will not have it until Tues. 4/6. Would you like to change to something else or is it OK to wait for tomorrow? TORITO Fishman R.N.    Call [phone] option \"0\"  "

## 2021-04-05 NOTE — TELEPHONE ENCOUNTER
Regarding Klonopin and Remeron refill. It appears pt has a refill of Remeron available at [facility].    Last seen: 4/1/21 (open)  RTC: uncertain  Cancel: none  No-show: none  Next appt: none     Medication requested: clonazePAM (KLONOPIN) 1 MG tablet  Directions: Take 1 tablet (1 mg) by mouth 2 times daily AND 2 tablets (2 mg) At Bedtime  Qty: 360  Last refilled: 1/27/21, 12/9/20 (30 d/s), and 11/3/20 (30 d/s) per [place]      Routed to provider for review and approval

## 2021-04-05 NOTE — TELEPHONE ENCOUNTER
Jaden Rodriguez MD Labossiere, Laura RN   Caller: Unspecified (Today, 10:28 AM)             OK 2 fills Klonopin.  Primary care doc should deal with Flexeril .... I never use it.      Writer called pt and relayed the provider's recommendation regarding Flexeril. She voiced understanding. Agreed to refill the Klonopin and Remeron. Pt mentioned that she decreased the Remeron recently. She's wondering if she should stay at 1 tab (30 mg) or increase back to 2 tabs (60 mg), as she's experiencing depressive symptoms. Agreed to discuss this with the provider and then call her back.
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
## 2021-04-06 RX ORDER — CLONAZEPAM 1 MG/1
TABLET ORAL
Qty: 360 TABLET | Refills: 0 | Status: SHIPPED | OUTPATIENT
Start: 2021-04-06 | End: 2021-06-28

## 2021-04-06 RX ORDER — OLANZAPINE 5 MG/1
TABLET ORAL
Qty: 180 TABLET | Refills: 0 | Status: SHIPPED | OUTPATIENT
Start: 2021-04-06 | End: 2021-08-04

## 2021-04-08 ENCOUNTER — OFFICE VISIT (OUTPATIENT)
Dept: OBGYN | Facility: CLINIC | Age: 51
End: 2021-04-08
Payer: COMMERCIAL

## 2021-04-08 VITALS — DIASTOLIC BLOOD PRESSURE: 60 MMHG | BODY MASS INDEX: 21.52 KG/M2 | SYSTOLIC BLOOD PRESSURE: 110 MMHG | WEIGHT: 131.3 LBS

## 2021-04-08 DIAGNOSIS — B97.7 HUMAN PAPILLOMA VIRUS (HPV) INFECTION: Primary | ICD-10-CM

## 2021-04-08 PROCEDURE — 57456 ENDOCERV CURETTAGE W/SCOPE: CPT | Performed by: OBSTETRICS & GYNECOLOGY

## 2021-04-08 PROCEDURE — 88305 TISSUE EXAM BY PATHOLOGIST: CPT | Performed by: PATHOLOGY

## 2021-04-08 NOTE — NURSING NOTE
"Chief Complaint   Patient presents with     Colposcopy     initial /60   Wt 59.6 kg (131 lb 4.8 oz)   BMI 21.52 kg/m   Estimated body mass index is 21.52 kg/m  as calculated from the following:    Height as of 1/18/21: 1.664 m (5' 5.5\").    Weight as of this encounter: 59.6 kg (131 lb 4.8 oz).  BP completed using cuff size regular.  Shantelle Luke CMA    "

## 2021-04-08 NOTE — PROGRESS NOTES
50 year old  presents for colposcopy.      Indication for procedure:HPV (Human Papillomavirus) positive.      There are many types of HPV, but we test Pap samples for the high-risk types. HPV can be the cause of an abnormal Pap smear result.  The high-risk types of HPV can also be related to the potential development of cervical cancer if not monitored and/or treated appropriately  Prior history of cervical dysplasia: Yes MICHELLE 1 on ECC in  and 3/20    Discussed role of LEEP should persistent CIN1 be identified or MICHELLE 2 or >    Prior Colposcopy history: Yes   Prior LEEP:No  No LMP recorded. Patient is perimenopausal.    Tobacco: Yes, daily smoker  Gardasil vaccination status:No      Discussed nature of HPV related infection, natural history and association with cervical dysplasia.  Procedure for colposcopy and biopsy was explained to the patient and consent obtained.  All the patient's questions were answered.    PROCEDURE:  COLPOSCOPY  After a procedural timeout was taken, she was positioned in dorsal lithotomy and a speculum was inserted to allow visualization of the cervix. A 5% acetic acid solution was applied to the ectocervix with large swabs. Lugols solution was also applied.  Colposcopic examination was then undertaken of the cervix, distal vaginal canal and vaginal fornices.    FINDINGS:Physical Exam  Genitourinary:              Procedures      Plan: Specimens labelled and sent to pathology., Will base further treatment on pathology findings. and post biopsy instructions given to patient.      Celso Mcwilliams MD

## 2021-04-09 ENCOUNTER — TELEPHONE (OUTPATIENT)
Dept: PSYCHIATRY | Facility: CLINIC | Age: 51
End: 2021-04-09

## 2021-04-09 NOTE — TELEPHONE ENCOUNTER
"Writer received incoming phone call from the pt. She was immediately tearful on the phone and said, \"I'm in a depressive episode. I'm just in utter despair.\" Pt denies SI or any safety concerns. A friend will be going to her home at 1 pm and pt confirmed that she can be safe until that time.    Pt had a biopsy for an abnormal pap smear yesterday. They found pre-cancerous cells and pt will need to have surgery to remove these cells. This news has been quite upsetting to the pt.    The pt was unaware that she was supposed to increase her Remeron back to 60 mg for a return in depressive symptoms. Last night she only took 30 mg and is wondering if she can take the additional 30 mg this morning + the full 60 mg dose at bedtime. Agreed to discuss this with the provider and will then call her back with recommendations.  "

## 2021-04-09 NOTE — TELEPHONE ENCOUNTER
Received confirmation from provider that pt can take extra 30 mg dose of Remeron today + the full 60 mg tonight at bedtime. Provider would like pt to be aware that she may feel quite tired today.    Called pt and relayed the above recommendation. She voiced understanding and confirmed she will take the extra 30 mg of Remeron this morning. Pt asked that Dr. Rodriguez call her if possible. Explained that he's likely unavailable at this time, but writer will reach out to him with her request.

## 2021-04-10 ENCOUNTER — MYC MEDICAL ADVICE (OUTPATIENT)
Dept: PSYCHIATRY | Facility: CLINIC | Age: 51
End: 2021-04-10

## 2021-04-12 LAB — COPATH REPORT: NORMAL

## 2021-04-13 ENCOUNTER — IMMUNIZATION (OUTPATIENT)
Dept: NURSING | Facility: CLINIC | Age: 51
End: 2021-04-13
Attending: INTERNAL MEDICINE
Payer: COMMERCIAL

## 2021-04-13 PROCEDURE — 0002A PR COVID VAC PFIZER DIL RECON 30 MCG/0.3 ML IM: CPT

## 2021-04-13 PROCEDURE — 91300 PR COVID VAC PFIZER DIL RECON 30 MCG/0.3 ML IM: CPT

## 2021-04-19 DIAGNOSIS — D72.829 ELEVATED WBC COUNT: Primary | ICD-10-CM

## 2021-04-19 DIAGNOSIS — R79.89 ELEVATED LIVER FUNCTION TESTS: ICD-10-CM

## 2021-04-19 LAB
ERYTHROCYTE [DISTWIDTH] IN BLOOD BY AUTOMATED COUNT: 14.4 % (ref 10–15)
HCT VFR BLD AUTO: 43.6 % (ref 35–47)
HGB BLD-MCNC: 14.6 G/DL (ref 11.7–15.7)
MCH RBC QN AUTO: 30.7 PG (ref 26.5–33)
MCHC RBC AUTO-ENTMCNC: 33.5 G/DL (ref 31.5–36.5)
MCV RBC AUTO: 92 FL (ref 78–100)
PLATELET # BLD AUTO: 226 10E9/L (ref 150–450)
RBC # BLD AUTO: 4.76 10E12/L (ref 3.8–5.2)
WBC # BLD AUTO: 12 10E9/L (ref 4–11)

## 2021-04-19 PROCEDURE — 80076 HEPATIC FUNCTION PANEL: CPT | Performed by: INTERNAL MEDICINE

## 2021-04-19 PROCEDURE — 36415 COLL VENOUS BLD VENIPUNCTURE: CPT | Performed by: INTERNAL MEDICINE

## 2021-04-19 PROCEDURE — 85027 COMPLETE CBC AUTOMATED: CPT | Performed by: INTERNAL MEDICINE

## 2021-04-20 LAB
ALBUMIN SERPL-MCNC: 4.2 G/DL (ref 3.4–5)
ALP SERPL-CCNC: 164 U/L (ref 40–150)
ALT SERPL W P-5'-P-CCNC: 47 U/L (ref 0–50)
AST SERPL W P-5'-P-CCNC: 68 U/L (ref 0–45)
BILIRUB DIRECT SERPL-MCNC: <0.1 MG/DL (ref 0–0.2)
BILIRUB SERPL-MCNC: 0.4 MG/DL (ref 0.2–1.3)
PROT SERPL-MCNC: 7.9 G/DL (ref 6.8–8.8)

## 2021-04-27 ENCOUNTER — TELEPHONE (OUTPATIENT)
Dept: PSYCHIATRY | Facility: CLINIC | Age: 51
End: 2021-04-27

## 2021-04-27 NOTE — TELEPHONE ENCOUNTER
"Incoming call from patient reporting an increase in symptoms. She endorses  fluctuating between high/low moods, \"mixed episodes, feeling depressed and manic at the same time,\" poor sleep (maybe 3 hours per night), fluctuating appetite. Patient tearful during conversation.     Writer asked what these mixed episodes look like for her and she stated, \"increased spending, making poor choices, and trying to manage everything alone.\" She reports these symptoms have impeded upon her ability to do daily tasks. She said she spends 4 hours crying every morning.     She also reports a recent abnormal pap smear and states she has surgery on her cervix this Thursday. She reports this has been a significant cause of stress.     She endorses passive SI with no plan. She reports she knows exactly what to do if she becomes unsafe; call the clinic or have someone drive her to the ED. She also reports she has \"a huge safety network.\" She denies SIB/HI/VAH.     She requests a letter \"for social security disability\" from Dr. Rodriguez. Writer asked for clarification/whether or not disability paperwork had been initiated. Patient said no, reports needing a letter \"just stating she is currently unable to work.\"     Will forward to RNCC partner Hattie for additional input.     "

## 2021-04-28 NOTE — TELEPHONE ENCOUNTER
Writer received notification from Dr. Rodriguez that he will be out sick and is unable to respond to epic messages today. He asked that urgent matters are sent to Dr. Lazaro.    Dr. Lazaro agreed to sign a letter on behalf of Dr. Rodriguez excusing the pt from work.    Writer called the pt. She is feeling much better today and said she does not need a letter at this time after all and will discuss things further at her next appt with Dr. Rodriguez.

## 2021-04-29 ENCOUNTER — OFFICE VISIT (OUTPATIENT)
Dept: OBGYN | Facility: CLINIC | Age: 51
End: 2021-04-29
Payer: COMMERCIAL

## 2021-04-29 VITALS — BODY MASS INDEX: 20.88 KG/M2 | WEIGHT: 127.4 LBS | DIASTOLIC BLOOD PRESSURE: 58 MMHG | SYSTOLIC BLOOD PRESSURE: 102 MMHG

## 2021-04-29 DIAGNOSIS — N87.0 MILD DYSPLASIA OF CERVIX: ICD-10-CM

## 2021-04-29 DIAGNOSIS — B97.7 HUMAN PAPILLOMA VIRUS (HPV) INFECTION: Primary | ICD-10-CM

## 2021-04-29 PROCEDURE — 88307 TISSUE EXAM BY PATHOLOGIST: CPT | Performed by: PATHOLOGY

## 2021-04-29 PROCEDURE — 57460 BX OF CERVIX W/SCOPE LEEP: CPT | Performed by: OBSTETRICS & GYNECOLOGY

## 2021-04-29 NOTE — PROGRESS NOTES
50 year old  presents for LEEP.  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   LEEP was recommended due to persistent MICHELLE 1 in a woman who has completed child bearing.  Discussed possible risks including cervical incompetence, infection, bleeding, discomfort, and possible incomplete excision of the abnormal tissue so close follow up was necessary. Consent was obtained and all questions were answered.    Today she has no complaints.        Procedure: LEEP    Indication:  _x__Persistent MICHELLE 1 ___CIN 2 ___CIN 3 ___Positive ECC    The procedure, its risks and goals as well as complications were discussed with the patient.  She agreed to proceed.  She was placed in the dorsal lithotomy position and a coated speculum inserted into the vagina.  The cervix was exposed.  A preliminary colposcopy exam was performed using a dilute solution of acetic acid, Lugol's solution was placed on the cervix.  The cervix was injected with a solution of 1% Lidocaine with 1:200,000 epinephrine.  Following this, a LEEP of the cervix was carried out in 1 pass.  A post leep ECC was not performed.  The bed of the LEEP was treated with electrocautery and Monsel's solution.  The instruments were removed.     The patient tolerated the procedurewell     50 year old  with persistent MICHELLE 1 .Will contact patient with pathology report and follow up plan.   Post-LEEP instructions were given to the patient.  She was told to expect heavy discharge for the first 4-7 days and could continue for 2 weeks. Nothing in the vagina and no intercourse for 4-6 weeks.  No heavy lifting for next few days.  Return for any signs of infection or heavy bleeding.    Celso Mcwilliams MD

## 2021-05-05 ENCOUNTER — TELEPHONE (OUTPATIENT)
Dept: INTERNAL MEDICINE | Facility: CLINIC | Age: 51
End: 2021-05-05

## 2021-05-05 DIAGNOSIS — R30.0 DYSURIA: ICD-10-CM

## 2021-05-05 DIAGNOSIS — R82.90 NONSPECIFIC FINDING ON EXAMINATION OF URINE: Primary | ICD-10-CM

## 2021-05-05 DIAGNOSIS — R30.0 DYSURIA: Primary | ICD-10-CM

## 2021-05-05 LAB
ALBUMIN UR-MCNC: NEGATIVE MG/DL
APPEARANCE UR: CLEAR
BACTERIA #/AREA URNS HPF: ABNORMAL /HPF
BILIRUB UR QL STRIP: NEGATIVE
COLOR UR AUTO: YELLOW
GLUCOSE UR STRIP-MCNC: NEGATIVE MG/DL
HGB UR QL STRIP: ABNORMAL
KETONES UR STRIP-MCNC: NEGATIVE MG/DL
LEUKOCYTE ESTERASE UR QL STRIP: ABNORMAL
NITRATE UR QL: POSITIVE
PH UR STRIP: 5 PH (ref 5–7)
RBC #/AREA URNS AUTO: ABNORMAL /HPF
SOURCE: ABNORMAL
SP GR UR STRIP: 1.01 (ref 1–1.03)
UROBILINOGEN UR STRIP-ACNC: 0.2 EU/DL (ref 0.2–1)
WBC #/AREA URNS AUTO: ABNORMAL /HPF
WBC CLUMPS #/AREA URNS HPF: PRESENT /HPF

## 2021-05-05 PROCEDURE — 87088 URINE BACTERIA CULTURE: CPT | Performed by: INTERNAL MEDICINE

## 2021-05-05 PROCEDURE — 87086 URINE CULTURE/COLONY COUNT: CPT | Performed by: INTERNAL MEDICINE

## 2021-05-05 PROCEDURE — 81001 URINALYSIS AUTO W/SCOPE: CPT | Performed by: INTERNAL MEDICINE

## 2021-05-05 PROCEDURE — 87186 SC STD MICRODIL/AGAR DIL: CPT | Performed by: INTERNAL MEDICINE

## 2021-05-05 RX ORDER — CEPHALEXIN 500 MG/1
500 CAPSULE ORAL 2 TIMES DAILY
Qty: 20 CAPSULE | Refills: 0 | Status: SHIPPED | OUTPATIENT
Start: 2021-05-05 | End: 2021-06-01

## 2021-05-05 NOTE — TELEPHONE ENCOUNTER
Patient calls and would like to bring in a urine to the lab for a UTI.  Symptoms for about 4 days. Burning and frequency.  Please advise.

## 2021-05-05 NOTE — PROGRESS NOTES
Just started over the past 2 days but have been very frequent. Eye rolling, blinking and facial grimacing.  Very frequent.  Of note, patient recently weaned off of Abilify over the past month.  This medicine is used to control tics so wonder if she has a tendency that has just been controlled with the medicine until it was stopped. Parent called psychiatrist and was told to come here as it was not something they thought was related to the medicine.  Advised that I would like her to see neurology to assess for tourette's given the nature of her tics.  Also will start on clonidine at night 0.05 mg.  Each week can add another dose to a max of TID dosing. If she has not seen neurology I would like to see her back in a month.   POLO [facility] [address] [facility] [id number]

## 2021-05-07 LAB
BACTERIA SPEC CULT: ABNORMAL
Lab: ABNORMAL
SPECIMEN SOURCE: ABNORMAL

## 2021-05-07 NOTE — TELEPHONE ENCOUNTER
Patient calls and wants to make sure she is on the correct antibiotic. Call patient to advise at [phone] (home)

## 2021-05-10 ENCOUNTER — PATIENT OUTREACH (OUTPATIENT)
Dept: OBGYN | Facility: CLINIC | Age: 51
End: 2021-05-10
Payer: COMMERCIAL

## 2021-05-10 NOTE — TELEPHONE ENCOUNTER
4/8/21 [place] - ECC, MICHELLE 1. Plan LEEP for persistent MICHELLE 1  4/29/21 LEEP- pending path

## 2021-05-11 LAB — COPATH REPORT: NORMAL

## 2021-05-13 ENCOUNTER — PATIENT OUTREACH (OUTPATIENT)
Dept: OBGYN | Facility: CLINIC | Age: 51
End: 2021-05-13
Payer: COMMERCIAL

## 2021-05-13 ENCOUNTER — TELEPHONE (OUTPATIENT)
Dept: INTERNAL MEDICINE | Facility: CLINIC | Age: 51
End: 2021-05-13

## 2021-05-13 DIAGNOSIS — N39.0 RECURRENT UTI: Primary | ICD-10-CM

## 2021-05-13 NOTE — TELEPHONE ENCOUNTER
Left message that lab order done and referral done. And to call for more info regarding referral.     [facility]   [address]   [address]   [facility] [address]   Phone: [phone]

## 2021-05-13 NOTE — TELEPHONE ENCOUNTER
Patient just finished antibiotic for UTI but she is miserable with urgency and pain urinating. Please advise. Ok to call and dontrell [phone]

## 2021-05-14 DIAGNOSIS — N39.0 RECURRENT UTI: ICD-10-CM

## 2021-05-14 LAB
ALBUMIN UR-MCNC: NEGATIVE MG/DL
APPEARANCE UR: CLEAR
BILIRUB UR QL STRIP: NEGATIVE
COLOR UR AUTO: YELLOW
GLUCOSE UR STRIP-MCNC: NEGATIVE MG/DL
HGB UR QL STRIP: NEGATIVE
KETONES UR STRIP-MCNC: NEGATIVE MG/DL
LEUKOCYTE ESTERASE UR QL STRIP: ABNORMAL
NITRATE UR QL: NEGATIVE
PH UR STRIP: 7 PH (ref 5–7)
RBC #/AREA URNS AUTO: ABNORMAL /HPF
SOURCE: ABNORMAL
SP GR UR STRIP: 1.01 (ref 1–1.03)
UROBILINOGEN UR STRIP-ACNC: 0.2 EU/DL (ref 0.2–1)
WBC #/AREA URNS AUTO: ABNORMAL /HPF

## 2021-05-14 PROCEDURE — 81001 URINALYSIS AUTO W/SCOPE: CPT | Performed by: INTERNAL MEDICINE

## 2021-05-20 ENCOUNTER — VIRTUAL VISIT (OUTPATIENT)
Dept: UROLOGY | Facility: CLINIC | Age: 51
End: 2021-05-20
Attending: INTERNAL MEDICINE
Payer: COMMERCIAL

## 2021-05-20 VITALS — HEIGHT: 66 IN | WEIGHT: 127 LBS | BODY MASS INDEX: 20.41 KG/M2

## 2021-05-20 DIAGNOSIS — N39.0 RECURRENT UTI: ICD-10-CM

## 2021-05-20 PROCEDURE — 99203 OFFICE O/P NEW LOW 30 MIN: CPT | Mod: GT | Performed by: PHYSICIAN ASSISTANT

## 2021-05-20 RX ORDER — METHENAMINE HIPPURATE 1000 MG/1
1 TABLET ORAL 2 TIMES DAILY
Qty: 60 TABLET | Refills: 11 | Status: SHIPPED | OUTPATIENT
Start: 2021-05-20 | End: 2021-06-01

## 2021-05-20 ASSESSMENT — MIFFLIN-ST. JEOR: SCORE: 1212.82

## 2021-05-20 NOTE — LETTER
5/20/2021       RE: Ana Laura Wharton  [address]  [facility] [address]     Dear Colleague,    Thank you for referring your patient, Ana Laura Wharton, to the [facility]OLOG[facility] [place] at [facility]. Please see a copy of my visit note below.    Ana Laura is a 50 year old who is being evaluated via a billable video visit.      How would you like to obtain your AVS? MyChart  If the video visit is dropped, the invitation should be resent by: Text to cell phone: [phone]  Will anyone else be joining your video visit? No    Bernie Manuel CMA    Video-Visit Details    Type of service:  Video Visit    Video Start Time: 1405    Video End Time:1425    Originating Location (pt. Location): Home    Distant Location (provider location):  [facility]     Platform used for Video Visit: [employer]     CC: Recurrent UTIs    HPI: Ms. Wharton is a 50 year old year old female via video visit for recurrent UTI. She is not immunosuppressed.  Today she complains of a recent history of frequent urinary tract infections.  Her urinary tract infection started between age 4 and 5.  She does not believe that she was specifically diagnosed with reflux but did have a surgery on her urinary system at age 5.  She does not know the exact surgery.  Patient has had pyelonephritis one time.  She was hospitalized approximately 3 to 4 years ago with a infection.  She does have a history of renal failure.  Most recent creatinine 0.84 with EGFR of 80.  Her typical certain stones when she gets urinary tract infection include dysuria, increased urgency and frequency and suprapubic tenderness.    She is given an antibiotic and most of the time her symptoms improved.  Patient feels that she generally empties her bladder completely.  She denies any history of nephrolithiasis.  She is on progesterone contraception since 2005 and has not had a  period due to this.  She does not believe that she is postmenopausal at this time.  Patient notes that approximately 3 years ago she had cystoscopy and imaging which did not show any definitive causes for her recurrent infections.  She has not had a hysterectomy.  She denies any history of sling, diabetes, or neurological conditions.    She does try to drink a ton of water to try to help with urinary tract infections.  She has tried cranberry supplementation, probiotics, and preventative antibiotics in the form of ciprofloxacin.  She got C. difficile after taking Cipro.  Her bowel movements are typically loose.  She cannot correlate her infections with intercourse.  She does have a family history in her maternal grandmother who had recurrent infections as she was older.    She has never tried Hiprex with Vitamin C.    Microbiologic history   Date Cultured organism(s) Sensitivity/Resistant   05/05/2021 50,000 100,000 CFU per mL of pansensitive E. coli  04/02/2021 greater than 100,000 group B strep  03/08/2021 50,000 200,000 CFU per mL of E. coli, pansensitive  11/20/2020 greater than 100,000 CFU per mL of E. coli, pansensitive  Several mixed mina cultures.  11/26/2019 Citrobacter freundii 50,000 200,000 CFU's per mL  10/30/2019 >100,000 CFU per mL [place] rettgeri   Numerous others    Review of Systems   Respiratory: Negative for shortness of breath.    Cardiovascular: Negative for chest pain.   Gastrointestinal: Positive for nausea. Negative for vomiting.   Genitourinary: Positive for dysuria, frequency and urgency.      Current Outpatient Medications   Medication     acetaminophen (TYLENOL) 500 MG tablet     albuterol (PROAIR HFA/PROVENTIL HFA/VENTOLIN HFA) 108 (90 Base) MCG/ACT inhaler     ampicillin (PRINCIPEN) 500 MG capsule     atorvastatin (LIPITOR) 10 MG tablet     carvedilol (COREG) 12.5 MG tablet     clindamycin (CLEOCIN T) 1 % external lotion     clobetasol (TEMOVATE) 0.05 % external cream      "clonazePAM (KLONOPIN) 1 MG tablet     cyclobenzaprine (FLEXERIL) 10 MG tablet     Emollient (HYDROPHOR EX)     Emollient (HYDROPHOR) OINT     fluticasone (FLONASE) 50 MCG/ACT nasal spray     folic acid (FOLVITE) 400 MCG tablet     hydrOXYzine (VISTARIL) 50 MG capsule     ketorolac (TORADOL) 30 MG/ML injection     levothyroxine (SYNTHROID/LEVOTHROID) 50 MCG tablet     mirtazapine (REMERON) 30 MG tablet     mometasone (ELOCON) 0.1 % external cream     Multiple Vitamins-Minerals (WOMENS MULTI VITAMIN & MINERAL PO)     norethindrone (MICRONOR) 0.35 MG tablet     OLANZapine (ZYPREXA) 10 MG tablet     OLANZapine (ZYPREXA) 5 MG tablet     ondansetron (ZOFRAN) 4 MG tablet     pantoprazole (PROTONIX) 40 MG EC tablet     polyethylene glycol (MIRALAX) 17 g packet     Sennosides-Docusate Sodium (SENNA S PO)     sildenafil (VIAGRA) 50 MG tablet     sodium chloride (OCEAN) 0.65 % nasal spray     sulfamethoxazole-trimethoprim (BACTRIM DS) 800-160 MG tablet     Syringe/Needle, Disp, (SYRINGE LUER LOCK) 25G X 1\" 3 ML MISC     amLODIPine (NORVASC) 5 MG tablet     cephALEXin (KEFLEX) 500 MG capsule     cephALEXin (KEFLEX) 500 MG capsule     No current facility-administered medications for this visit.      Allergies   Allergen Reactions     Ciprofloxacin Other (See Comments)     Joint pain cdiff     Compazine [Prochlorperazine] Other (See Comments)     dystonia     Metoclopramide Other (See Comments)     \"I feel like I am crawling out of my skin\"     Reglan [Metoclopramide Hcl] Other (See Comments)     Sensation of \"crawling out of skin\"     Restoril [Temazepam]      Thorazine [Chlorpromazine] Other (See Comments)     dystonia     Abilify [Aripiprazole] Rash     Lamotrigine Rash     Severe drug rash - contraindication to receiving again. Skin peeling.      Patient Active Problem List   Diagnosis     Depressed bipolar I disorder in full remission (H)     Anxiety disorder     Suicide ideation     Bipolar disorder current episode " depressed (H)     Overdose     Depression     Mood disorder (H)     Suicidal ideations     Bipolar I disorder (H)     Acute renal failure (H)     Alternating exotropia     Anxiety state     Other bipolar disorders     Personality disorder (H)     Tear film insufficiency     Dysfunctions associated with sleep stages or arousal from sleep     Abnormal finding of blood chemistry     Esophageal reflux     Other specified hypothyroidism     Impulse control disorder     Acidosis     Low back pain     Myopia     Opioid dependence (H)     Orofacial dyskinesia     Other migraine without status migrainosus, not intractable     Pure hypercholesterolemia     Poisoning by 4-aminophenol derivatives, undetermined intent     Essential hypertension     Left knee pain     Aftercare following surgery of the musculoskeletal system     UTI (urinary tract infection)     Acute cystitis     Clostridium difficile colitis     Drug overdose     Calcium channel blocker overdose     Intentional acetaminophen overdose (H)     Alcoholic intoxication with complication (H)     Hypotension due to medication     Acute renal failure, unspecified acute renal failure type (H)     Paroxysmal atrial fibrillation (H)     Acute respiratory failure with hypoxia (H)     Acute pulmonary edema (H)     Spontaneous pneumothorax     PRES (posterior reversible encephalopathy syndrome)     Pancreatitis     Atypical squamous cells cannot exclude high grade squamous intraepithelial lesion on cytologic smear of cervix (ASC-H)     Alcohol withdrawal syndrome without complication (H)     Past Medical History:   Diagnosis Date     Abnormal Pap smear of cervix 01/09/2019    see problem list     Anxiety      Bipolar disorder (H)      C. difficile colitis      Cervical high risk HPV (human papillomavirus) test positive 01/09/2019 & 2/19/2020    see problem list     Depressive disorder      Essential hypertension 7/8/2015     Gastro-oesophageal reflux disease      History of  "colposcopy 03/03/2020    see problem list     Hypothyroidism 4/1/2015     Migraine      Spontaneous pneumothorax     x3, resolved w/ pleurodesis      STD (sexually transmitted disease)      Suicide attempt (H)      Past Surgical History:   Procedure Laterality Date     ARTHROSCOPY KNEE      L knee     BLADDER SURGERY       CERVIX SURGERY      for pre-cancerous changes     COLONOSCOPY Left 3/3/2021    Procedure: COLONOSCOPY, WITH POLYPECTOMY USING COLD SNARE;  Surgeon: Duane Connell MD;  Location: [facility]     left knee surgery       ORTHOPEDIC SURGERY      L shoulder     THORACIC SURGERY      for pneumothorax, pleurodesis and lobe resection     Family History: Denies family history of urologic cancer. Family history of recurrent UTI in her maternal grandmother, later in life.    Social history:  Current every day smoker.  1 ppd.    GENERAL PHYSICAL EXAM:   Vitals: Ht 1.676 m (5' 6\")   Wt 57.6 kg (127 lb)   BMI 20.50 kg/m    GENERAL: Healthy, alert and no distress  EYES: Eyes grossly normal to inspection.  No discharge or erythema, or obvious scleral/conjunctival abnormalities.  HENT: Normal cephalic/atraumatic.  External ears, nose and mouth without ulcers or lesions.  No nasal drainage visible.  NECK: No asymmetry, visible masses or scars  RESP: No audible wheeze, cough, or visible cyanosis.  No visible retractions or increased work of breathing.    MS: No gross musculoskeletal defects noted.  Normal range of motion.  No visible edema.  SKIN: Visible skin clear. No significant rash, abnormal pigmentation or lesions.  NEURO: Cranial nerves grossly intact.  Mentation and speech appropriate for age.  PSYCH: Mentation appears normal, affect normal/bright, judgement and insight intact, normal speech and appearance well-groomed.     Recent Labs   Lab Test 05/14/21  1130   COLOR Yellow   APPEARANCE Clear   URINEGLC Negative   URINEBILI Negative   URINEKETONE Negative   SG 1.015   UBLD Negative   URINEPH 7.0   PROTEIN " Negative   UROBILINOGEN 0.2   NITRITE Negative   LEUKEST Small*   RBCU O - 2   WBCU 0 - 5     ASSESSMENT:   Recurrent UTIs    PLAN:   UA/UC  PVR  Mycoplasma, Ureaplasma  CT Urogram  Cysto with first available urologist  Bladder irritant list provided  Hydrate.  - Lifestyle and hygiene modifications were reviewed today in clinic, including wiping front to back, wearing cotton breathable underwear, voiding before and after intercourse to flush the urethra, minimizing baths and opting for showers, and appropriate perineal hygiene. Push fluids to keep the urine dilute    Hiprex 1000mg BID  Vitamin C 1000mg BID    We discussed that we would often prescribe estrogen cream if she were postmenopausal, but since this is not the case, we would hold off.  We will start with Hiprex and vitamin C.  I would recommend that patient come in for a urinalysis, urine culture, Ureaplasma, mycoplasma, postvoid residual.    Urinary tract infections are 2nd to the common cold and causing symptoms in women. The fact that you have urinary tract infections does not imply that there is anything wrong with you or that you are causing them with bad hygiene. We now know that there is strong family history for urinary tract infections due to some tissue markers allow for adherence of bacteria to the bladder lining. In addition we also know that urinary tract infections tend to cluster.  That means that the most recent infections that she was up for another infection within a short period of time. In this without anything wrong with you! For example the risk of a urinary tract infection in a given year and otherwise healthy sexually active female is only about 0.2% but wants a woman has 1 urinary tract infection her risk of getting another urinary tract infection within 2 years jumped to 20%. When she has had her 2nd urinary tract infection her risk of going on to have a 3rd is greater than 50%.     That does not mean that we would use antibiotics  in order to reduce your risk. This is called antibiotic prophylaxis, and we now know that it markedly increases the risk of multiple resistant bacteria.     Can consider D-mannose, ID consult, vegan diet, intravesical therapy, in the future.  Discussed that we may be able to improve them, but we generally cannot cure rUTIs.    Mary Jane Rivera PA-C  [facility]   [phone]

## 2021-05-20 NOTE — PROGRESS NOTES
Ana Laura is a 50 year old who is being evaluated via a billable video visit.      How would you like to obtain your AVS? [employer]  If the video visit is dropped, the invitation should be resent by: Text to cell phone: [phone]  Will anyone else be joining your video visit? No    Bernie Manuel CMA    Video-Visit Details    Type of service:  Video Visit    Video Start Time: 1405    Video End Time:1425    Originating Location (pt. Location): Home    Distant Location (provider location):  [facility] [place]     Platform used for Video Visit: [employer]     CC: Recurrent UTIs    HPI: Ms. Wharton is a 50 year old year old female via video visit for recurrent UTI. She is not immunosuppressed.  Today she complains of a recent history of frequent urinary tract infections.  Her urinary tract infection started between age 4 and 5.  She does not believe that she was specifically diagnosed with reflux but did have a surgery on her urinary system at age 5.  She does not know the exact surgery.  Patient has had pyelonephritis one time.  She was hospitalized approximately 3 to 4 years ago with a infection.  She does have a history of renal failure.  Most recent creatinine 0.84 with EGFR of 80.  Her typical certain stones when she gets urinary tract infection include dysuria, increased urgency and frequency and suprapubic tenderness.    She is given an antibiotic and most of the time her symptoms improved.  Patient feels that she generally empties her bladder completely.  She denies any history of nephrolithiasis.  She is on progesterone contraception since 2005 and has not had a period due to this.  She does not believe that she is postmenopausal at this time.  Patient notes that approximately 3 years ago she had cystoscopy and imaging which did not show any definitive causes for her recurrent infections.  She has not had a hysterectomy.  She denies any history of sling, diabetes, or neurological  conditions.    She does try to drink a ton of water to try to help with urinary tract infections.  She has tried cranberry supplementation, probiotics, and preventative antibiotics in the form of ciprofloxacin.  She got C. difficile after taking Cipro.  Her bowel movements are typically loose.  She cannot correlate her infections with intercourse.  She does have a family history in her maternal grandmother who had recurrent infections as she was older.    She has never tried Hiprex with Vitamin C.    Microbiologic history   Date Cultured organism(s) Sensitivity/Resistant   05/05/2021 50,000 100,000 CFU per mL of pansensitive E. coli  04/02/2021 greater than 100,000 group B strep  03/08/2021 50,000 200,000 CFU per mL of E. coli, pansensitive  11/20/2020 greater than 100,000 CFU per mL of E. coli, pansensitive  Several mixed mina cultures.  11/26/2019 Citrobacter freundii 50,000 200,000 CFU's per mL  10/30/2019 >100,000 CFU per mL [place] rettgeri   Numerous others    Review of Systems   Respiratory: Negative for shortness of breath.    Cardiovascular: Negative for chest pain.   Gastrointestinal: Positive for nausea. Negative for vomiting.   Genitourinary: Positive for dysuria, frequency and urgency.      Current Outpatient Medications   Medication     acetaminophen (TYLENOL) 500 MG tablet     albuterol (PROAIR HFA/PROVENTIL HFA/VENTOLIN HFA) 108 (90 Base) MCG/ACT inhaler     ampicillin (PRINCIPEN) 500 MG capsule     atorvastatin (LIPITOR) 10 MG tablet     carvedilol (COREG) 12.5 MG tablet     clindamycin (CLEOCIN T) 1 % external lotion     clobetasol (TEMOVATE) 0.05 % external cream     clonazePAM (KLONOPIN) 1 MG tablet     cyclobenzaprine (FLEXERIL) 10 MG tablet     Emollient (HYDROPHOR EX)     Emollient (HYDROPHOR) OINT     fluticasone (FLONASE) 50 MCG/ACT nasal spray     folic acid (FOLVITE) 400 MCG tablet     hydrOXYzine (VISTARIL) 50 MG capsule     ketorolac (TORADOL) 30 MG/ML injection     levothyroxine  "(SYNTHROID/LEVOTHROID) 50 MCG tablet     mirtazapine (REMERON) 30 MG tablet     mometasone (ELOCON) 0.1 % external cream     Multiple Vitamins-Minerals (WOMENS MULTI VITAMIN & MINERAL PO)     norethindrone (MICRONOR) 0.35 MG tablet     OLANZapine (ZYPREXA) 10 MG tablet     OLANZapine (ZYPREXA) 5 MG tablet     ondansetron (ZOFRAN) 4 MG tablet     pantoprazole (PROTONIX) 40 MG EC tablet     polyethylene glycol (MIRALAX) 17 g packet     Sennosides-Docusate Sodium (SENNA S PO)     sildenafil (VIAGRA) 50 MG tablet     sodium chloride (OCEAN) 0.65 % nasal spray     sulfamethoxazole-trimethoprim (BACTRIM DS) 800-160 MG tablet     Syringe/Needle, Disp, (SYRINGE LUER LOCK) 25G X 1\" 3 ML MISC     amLODIPine (NORVASC) 5 MG tablet     cephALEXin (KEFLEX) 500 MG capsule     cephALEXin (KEFLEX) 500 MG capsule     No current facility-administered medications for this visit.      Allergies   Allergen Reactions     Ciprofloxacin Other (See Comments)     Joint pain cdiff     Compazine [Prochlorperazine] Other (See Comments)     dystonia     Metoclopramide Other (See Comments)     \"I feel like I am crawling out of my skin\"     Reglan [Metoclopramide Hcl] Other (See Comments)     Sensation of \"crawling out of skin\"     Restoril [Temazepam]      Thorazine [Chlorpromazine] Other (See Comments)     dystonia     Abilify [Aripiprazole] Rash     Lamotrigine Rash     Severe drug rash - contraindication to receiving again. Skin peeling.      Patient Active Problem List   Diagnosis     Depressed bipolar I disorder in full remission (H)     Anxiety disorder     Suicide ideation     Bipolar disorder current episode depressed (H)     Overdose     Depression     Mood disorder (H)     Suicidal ideations     Bipolar I disorder (H)     Acute renal failure (H)     Alternating exotropia     Anxiety state     Other bipolar disorders     Personality disorder (H)     Tear film insufficiency     Dysfunctions associated with sleep stages or arousal from " sleep     Abnormal finding of blood chemistry     Esophageal reflux     Other specified hypothyroidism     Impulse control disorder     Acidosis     Low back pain     Myopia     Opioid dependence (H)     Orofacial dyskinesia     Other migraine without status migrainosus, not intractable     Pure hypercholesterolemia     Poisoning by 4-aminophenol derivatives, undetermined intent     Essential hypertension     Left knee pain     Aftercare following surgery of the musculoskeletal system     UTI (urinary tract infection)     Acute cystitis     Clostridium difficile colitis     Drug overdose     Calcium channel blocker overdose     Intentional acetaminophen overdose (H)     Alcoholic intoxication with complication (H)     Hypotension due to medication     Acute renal failure, unspecified acute renal failure type (H)     Paroxysmal atrial fibrillation (H)     Acute respiratory failure with hypoxia (H)     Acute pulmonary edema (H)     Spontaneous pneumothorax     PRES (posterior reversible encephalopathy syndrome)     Pancreatitis     Atypical squamous cells cannot exclude high grade squamous intraepithelial lesion on cytologic smear of cervix (ASC-H)     Alcohol withdrawal syndrome without complication (H)     Past Medical History:   Diagnosis Date     Abnormal Pap smear of cervix 01/09/2019    see problem list     Anxiety      Bipolar disorder (H)      C. difficile colitis      Cervical high risk HPV (human papillomavirus) test positive 01/09/2019 & 2/19/2020    see problem list     Depressive disorder      Essential hypertension 7/8/2015     Gastro-oesophageal reflux disease      History of colposcopy 03/03/2020    see problem list     Hypothyroidism 4/1/2015     Migraine      Spontaneous pneumothorax     x3, resolved w/ pleurodesis      STD (sexually transmitted disease)      Suicide attempt (H)      Past Surgical History:   Procedure Laterality Date     ARTHROSCOPY KNEE      L knee     BLADDER SURGERY       CERVIX  "SURGERY      for pre-cancerous changes     COLONOSCOPY Left 3/3/2021    Procedure: COLONOSCOPY, WITH POLYPECTOMY USING COLD SNARE;  Surgeon: Duane Connell MD;  Location: [facility]     left knee surgery       ORTHOPEDIC SURGERY      L shoulder     THORACIC SURGERY      for pneumothorax, pleurodesis and lobe resection     Family History: Denies family history of urologic cancer. Family history of recurrent UTI in her maternal grandmother, later in life.    Social history:  Current every day smoker.  1 ppd.    GENERAL PHYSICAL EXAM:   Vitals: Ht 1.676 m (5' 6\")   Wt 57.6 kg (127 lb)   BMI 20.50 kg/m    GENERAL: Healthy, alert and no distress  EYES: Eyes grossly normal to inspection.  No discharge or erythema, or obvious scleral/conjunctival abnormalities.  HENT: Normal cephalic/atraumatic.  External ears, nose and mouth without ulcers or lesions.  No nasal drainage visible.  NECK: No asymmetry, visible masses or scars  RESP: No audible wheeze, cough, or visible cyanosis.  No visible retractions or increased work of breathing.    MS: No gross musculoskeletal defects noted.  Normal range of motion.  No visible edema.  SKIN: Visible skin clear. No significant rash, abnormal pigmentation or lesions.  NEURO: Cranial nerves grossly intact.  Mentation and speech appropriate for age.  PSYCH: Mentation appears normal, affect normal/bright, judgement and insight intact, normal speech and appearance well-groomed.     Recent Labs   Lab Test 05/14/21  1130   COLOR Yellow   APPEARANCE Clear   URINEGLC Negative   URINEBILI Negative   URINEKETONE Negative   SG 1.015   UBLD Negative   URINEPH 7.0   PROTEIN Negative   UROBILINOGEN 0.2   NITRITE Negative   LEUKEST Small*   RBCU O - 2   WBCU 0 - 5     ASSESSMENT:   Recurrent UTIs    PLAN:   UA/UC  PVR  Mycoplasma, Ureaplasma  CT Urogram  Cysto with first available urologist  Bladder irritant list provided  Hydrate.  - Lifestyle and hygiene modifications were reviewed today in clinic, " including wiping front to back, wearing cotton breathable underwear, voiding before and after intercourse to flush the urethra, minimizing baths and opting for showers, and appropriate perineal hygiene. Push fluids to keep the urine dilute    Hiprex 1000mg BID  Vitamin C 1000mg BID    We discussed that we would often prescribe estrogen cream if she were postmenopausal, but since this is not the case, we would hold off.  We will start with Hiprex and vitamin C.  I would recommend that patient come in for a urinalysis, urine culture, Ureaplasma, mycoplasma, postvoid residual.    Urinary tract infections are 2nd to the common cold and causing symptoms in women. The fact that you have urinary tract infections does not imply that there is anything wrong with you or that you are causing them with bad hygiene. We now know that there is strong family history for urinary tract infections due to some tissue markers allow for adherence of bacteria to the bladder lining. In addition we also know that urinary tract infections tend to cluster.  That means that the most recent infections that she was up for another infection within a short period of time. In this without anything wrong with you! For example the risk of a urinary tract infection in a given year and otherwise healthy sexually active female is only about 0.2% but wants a woman has 1 urinary tract infection her risk of getting another urinary tract infection within 2 years jumped to 20%. When she has had her 2nd urinary tract infection her risk of going on to have a 3rd is greater than 50%.     That does not mean that we would use antibiotics in order to reduce your risk. This is called antibiotic prophylaxis, and we now know that it markedly increases the risk of multiple resistant bacteria.     Can consider D-mannose, ID consult, vegan diet, intravesical therapy, in the future.  Discussed that we may be able to improve them, but we generally cannot cure  Ade.    Mary Jane Rivera PA-C  [facility]   [phone]

## 2021-05-21 ENCOUNTER — TELEPHONE (OUTPATIENT)
Dept: UROLOGY | Facility: CLINIC | Age: 51
End: 2021-05-21

## 2021-05-21 NOTE — TELEPHONE ENCOUNTER
"Per PA \"It can be the Hiprex.  It can cause some GI upset.  If she can't tolerate it, I don't have a ton of other medications for UTI help/prevent.  She could try D-mannose, which is over the counter, but it works best for E. Coli UTIs.     She can try a few more does, to see if it gets better, but I don't really have much for alternatives.  Urgitael has the methamine in it too. \"    Called patient and LM. Will wait for a return phone call to inform.     Evelin Martinez LPN    "

## 2021-05-21 NOTE — TELEPHONE ENCOUNTER
[facility] Call Center    Phone Message    May a detailed message be left on voicemail: yes     Reason for Call: Medication Question or concern regarding medication   Prescription Clarification  Name of Medication: methenamine (HIPREX) 1 g tablet  Prescribing Provider: HAMIDA Carlton   Pharmacy: [facility] [id number] [place] [address]   What on the order needs clarification? Severe stomach intolerance to medication after taking it twice.          Action Taken: Message routed to:  [facility]  clinical [place]    Travel Screening: Not Applicable

## 2021-05-21 NOTE — TELEPHONE ENCOUNTER
Kaylan message sent to PA to Advise. Spoke with patient, she reports that she is having stomach pain. Recommended she take some tylenol. Patient reports that she ate food with medication. Will send another message to PA to advise.     Evelin Martinez LPN

## 2021-05-21 NOTE — TELEPHONE ENCOUNTER
Returned phone call and read PA's instructions to patient. She was somewhat rude during this conversation and thought that I came up with the instructions below from our PA. I explained that these were her instructions and when I read them to her I read them verbatim. She was not very understanding that these came from the PA. Explained in detail what D-mannose was and where to find over the counter at pharmacy. Also explained that it helps with UTI's, but will only cover typically E-coli UTi's.  She plans to stop taking the Hipprex for now due to side effects. She wanted to know, if she should continue on Vitamin C. Message sent to PA, and informed patient that we will wait for a reply from PA and then call her with a response.     Evelin Martinez LPN

## 2021-05-25 DIAGNOSIS — Z87.440 HISTORY OF RECURRENT UTIS: Primary | ICD-10-CM

## 2021-05-26 ENCOUNTER — RECORDS - HEALTHEAST (OUTPATIENT)
Dept: ADMINISTRATIVE | Facility: CLINIC | Age: 51
End: 2021-05-26

## 2021-05-27 ASSESSMENT — ENCOUNTER SYMPTOMS
FREQUENCY: 1
NAUSEA: 1
DYSURIA: 1
SHORTNESS OF BREATH: 0
VOMITING: 0

## 2021-05-27 NOTE — TELEPHONE ENCOUNTER
"Per PA- \"She can continue with the Vitamin C.  This helps some with UTIs, but better with the Hiprex.\"    Evelin Martinez LPN    "

## 2021-05-27 NOTE — PATIENT INSTRUCTIONS
Ms. Wharton has opted to pursue prophylactic therapy with Hiprex 1 g by mouth twice a day along with vitamin-C 500 mg by mouth twice a day. We discussed the mechanism of action and potential side effects. If the patient has symptoms of a urinary tract infection while taking the medication she should have their urine tested with a urinalysis and urine culture with sensitivities and be treated with the appropriate antibiotic. If an antibiotic is prescribed, Hiprex therapy should stop while on the antibiotic and resume once the antibiotic therapy has been completed.     Treatment failure on Hiprex is not indicated by rachel a urinary tract infection. Treatment failure is indicated by rachel multiple urinary tract infections per year.     Below is a list of things that can irritate the bladder and should try to remove to see if it improves your symptoms:       Caffeinated soft drinks.    Coffee.    Tea.    Chocolate.    Tomato-based foods.    Acidic juices and fruits. (includes cranberry juice)    Alcohol.    Carbonated drinks.    Aspartame/Nutrasweet (artificial sweeteners)    Vitamin C supplements and citrus fruit    Spicy food    Also, consider discontinuing smoking.

## 2021-05-28 ENCOUNTER — HOSPITAL ENCOUNTER (OUTPATIENT)
Dept: CT IMAGING | Facility: CLINIC | Age: 51
Discharge: HOME | End: 2021-05-28
Attending: PHYSICIAN ASSISTANT | Admitting: PHYSICIAN ASSISTANT
Payer: COMMERCIAL

## 2021-05-28 DIAGNOSIS — Z87.440 HISTORY OF RECURRENT UTIS: ICD-10-CM

## 2021-05-28 PROCEDURE — 250N000009 HC RX 250: Performed by: RADIOLOGY

## 2021-05-28 PROCEDURE — 74178 CT ABD&PLV WO CNTR FLWD CNTR: CPT

## 2021-05-28 PROCEDURE — 250N000011 HC RX IP 250 OP 636: Performed by: RADIOLOGY

## 2021-05-28 RX ORDER — IOPAMIDOL 755 MG/ML
500 INJECTION, SOLUTION INTRAVASCULAR ONCE
Status: COMPLETED | OUTPATIENT
Start: 2021-05-28 | End: 2021-05-28

## 2021-05-28 RX ADMIN — IOPAMIDOL 64 ML: 755 INJECTION, SOLUTION INTRAVENOUS at 11:54

## 2021-05-28 RX ADMIN — SODIUM CHLORIDE 60 ML: 9 INJECTION, SOLUTION INTRAVENOUS at 11:54

## 2021-05-29 ENCOUNTER — MYC MEDICAL ADVICE (OUTPATIENT)
Dept: INTERNAL MEDICINE | Facility: CLINIC | Age: 51
End: 2021-05-29

## 2021-06-01 ENCOUNTER — OFFICE VISIT (OUTPATIENT)
Dept: UROLOGY | Facility: CLINIC | Age: 51
End: 2021-06-01
Payer: COMMERCIAL

## 2021-06-01 VITALS
DIASTOLIC BLOOD PRESSURE: 78 MMHG | WEIGHT: 127 LBS | HEIGHT: 66 IN | BODY MASS INDEX: 20.41 KG/M2 | SYSTOLIC BLOOD PRESSURE: 102 MMHG

## 2021-06-01 DIAGNOSIS — R30.0 DYSURIA: ICD-10-CM

## 2021-06-01 DIAGNOSIS — Z87.440 HISTORY OF RECURRENT UTIS: Primary | ICD-10-CM

## 2021-06-01 DIAGNOSIS — R35.0 URINARY FREQUENCY: ICD-10-CM

## 2021-06-01 LAB
ALBUMIN UR-MCNC: 30 MG/DL
APPEARANCE UR: CLEAR
BILIRUB UR QL STRIP: NEGATIVE
COLOR UR AUTO: YELLOW
GLUCOSE UR STRIP-MCNC: 100 MG/DL
HGB UR QL STRIP: ABNORMAL
KETONES UR STRIP-MCNC: NEGATIVE MG/DL
LEUKOCYTE ESTERASE UR QL STRIP: ABNORMAL
NITRATE UR QL: POSITIVE
PH UR STRIP: 5.5 PH (ref 5–7)
SOURCE: ABNORMAL
SP GR UR STRIP: 1.02 (ref 1–1.03)
UROBILINOGEN UR STRIP-ACNC: 2 EU/DL (ref 0.2–1)

## 2021-06-01 PROCEDURE — 87186 SC STD MICRODIL/AGAR DIL: CPT | Performed by: STUDENT IN AN ORGANIZED HEALTH CARE EDUCATION/TRAINING PROGRAM

## 2021-06-01 PROCEDURE — 99213 OFFICE O/P EST LOW 20 MIN: CPT | Performed by: STUDENT IN AN ORGANIZED HEALTH CARE EDUCATION/TRAINING PROGRAM

## 2021-06-01 PROCEDURE — 81003 URINALYSIS AUTO W/O SCOPE: CPT | Mod: QW | Performed by: STUDENT IN AN ORGANIZED HEALTH CARE EDUCATION/TRAINING PROGRAM

## 2021-06-01 PROCEDURE — 87088 URINE BACTERIA CULTURE: CPT | Performed by: STUDENT IN AN ORGANIZED HEALTH CARE EDUCATION/TRAINING PROGRAM

## 2021-06-01 PROCEDURE — 87086 URINE CULTURE/COLONY COUNT: CPT | Performed by: STUDENT IN AN ORGANIZED HEALTH CARE EDUCATION/TRAINING PROGRAM

## 2021-06-01 ASSESSMENT — PAIN SCALES - GENERAL: PAINLEVEL: NO PAIN (0)

## 2021-06-01 ASSESSMENT — MIFFLIN-ST. JEOR: SCORE: 1212.82

## 2021-06-01 NOTE — NURSING NOTE
Chief Complaint   Patient presents with     UTI     Here for cystoscopy   Patient has burning and frequency since Friday.  post void residual 52 ml  Elly Montgomery LPN

## 2021-06-01 NOTE — PROGRESS NOTES
"CHIEF COMPLAINT   Kee Wharton who is a 50 year old female returns today for follow-up of recurrent UTI.      HPI   Kee Wharton is a 50 year old female who presents with a history of recurrent UTI.  Saw Mary Jane Rivera PA-C on 5/20/2021 for virtual visit, see her note for details. She was recommend imaging and follow up for cystoscopy    Currently she feels that she has symptoms of UTI. Feels like she urinates constantly but has to push and strain to empty    Tried methenamine and did not tolerate it well, made her vomit    PHYSICAL EXAM  Patient is a 50 year old  female   Vitals: Blood pressure 102/78, height 1.676 m (5' 6\"), weight 57.6 kg (127 lb), not currently breastfeeding.  Body mass index is 20.5 kg/m .  General Appearance Adult:   Alert, no acute distress, oriented  HENT: throat/mouth:normal, good dentition  Lungs: no respiratory distress, or pursed lip breathing  Heart: No obvious jugular venous distension present  Abdomen: soft, nontender, no organomegaly or masses  Musculoskeltal: extremities normal, no peripheral edema  Skin: no suspicious lesions or rashes  Neuro: Alert, oriented, speech and mentation normal  Psych: affect and mood normal  Gait: Normal    Results for KEE WHARTON (MRN [id number]) as of 6/3/2021 11:04   Ref. Range 6/1/2021 09:29   Color Urine Unknown Yellow   Appearance Urine Unknown Clear   Glucose Urine Latest Ref Range: NEG^Negative mg/dL 100 (A)   Bilirubin Urine Latest Ref Range: NEG^Negative  Negative   Ketones Urine Latest Ref Range: NEG^Negative mg/dL Negative   Specific Gravity Urine Latest Ref Range: 1.003 - 1.035  1.020   pH Urine Latest Ref Range: 5.0 - 7.0 pH 5.5   Protein Albumin Urine Latest Ref Range: NEG^Negative mg/dL 30 (A)   Urobilinogen Urine Latest Ref Range: 0.2 - 1.0 EU/dL 2.0 (H)   Nitrite Urine Latest Ref Range: NEG^Negative  Positive (A)   Blood Urine Latest Ref Range: NEG^Negative  Moderate (A)   Leukocyte Esterase Urine Latest Ref Range: " NEG^Negative  Large (A)   Source Unknown Midstream Urine       All pertinent imaging reviewed:    CT urogram    IMPRESSION:    1. Small nonobstructing left renal collecting system stone. No  hydronephrosis or additional urinary tract calculi. No renal cyst or  mass. Bladder is distended but otherwise within normal limits.  2. Stable fluid collection left retroperitoneum possibly posterior  gastric diverticulum or pseudocyst. This is minimally changed compared  to prior study.  3. Findings concerning for cirrhotic liver morphology with slight  nodular contour and a recannulized paraumbilical vein. Borderline  splenomegaly. Follow-up as clinically warranted.             Estimate 1160 ml bladder on CT imaging.    ASSESSMENT and PLAN  50 year old female who presents with a history of recurrent UTI. Did not tolerate methenamine. Today symptomatic, UA with positive nitrites. Cancelling and rescheduling cystoscopy. CTU with no obvious source of infection except for distended bladder. Tiny left sided renal stone, nonobstructive    Send urine culture, treat as indicated  Will keep on abx ppx (likely nitrofurantoin of Bactrim) at least through the rescheduled cystosocpy  Reschedule cystoscopy    *addendum 6/3/2021*  Spoke with patient on the phone. Urine culture results with pan sensitive E. Coli. Bladder distended.  Recommend Nitrofurantoin treatment course followed by daily ppx  Recommend nurse visit for catheterized post void residual, with possible intermittent self cath teaching vs. Napoles catheter placement. Referral for urodynamics. Return for cysto    Lewis Feldman MD   [facility]  [facility] Phone: [phone]

## 2021-06-01 NOTE — LETTER
"6/1/2021       RE: Kee Wharton  [address]  [place] [address]     Dear Colleague,    Thank you for referring your patient, Kee Wharton, to the [facility] [place] at [facility]. Please see a copy of my visit note below.    CHIEF COMPLAINT   Kee Wharton who is a 50 year old female returns today for follow-up of recurrent UTI.      HPI   Kee Wharton is a 50 year old female who presents with a history of recurrent UTI.  Saw Mary Jane Rivera PA-C on 5/20/2021 for virtual visit, see her note for details. She was recommend imaging and follow up for cystoscopy    Currently she feels that she has symptoms of UTI. Feels like she urinates constantly but has to push and strain to empty    Tried methenamine and did not tolerate it well, made her vomit    PHYSICAL EXAM  Patient is a 50 year old  female   Vitals: Blood pressure 102/78, height 1.676 m (5' 6\"), weight 57.6 kg (127 lb), not currently breastfeeding.  Body mass index is 20.5 kg/m .  General Appearance Adult:   Alert, no acute distress, oriented  HENT: throat/mouth:normal, good dentition  Lungs: no respiratory distress, or pursed lip breathing  Heart: No obvious jugular venous distension present  Abdomen: soft, nontender, no organomegaly or masses  Musculoskeltal: extremities normal, no peripheral edema  Skin: no suspicious lesions or rashes  Neuro: Alert, oriented, speech and mentation normal  Psych: affect and mood normal  Gait: Normal    Results for KEE WHARTON (MRN [id number]) as of 6/3/2021 11:04   Ref. Range 6/1/2021 09:29   Color Urine Unknown Yellow   Appearance Urine Unknown Clear   Glucose Urine Latest Ref Range: NEG^Negative mg/dL 100 (A)   Bilirubin Urine Latest Ref Range: NEG^Negative  Negative   Ketones Urine Latest Ref Range: NEG^Negative mg/dL Negative   Specific Gravity Urine Latest Ref Range: 1.003 - 1.035  1.020   pH Urine Latest Ref " Range: 5.0 - 7.0 pH 5.5   Protein Albumin Urine Latest Ref Range: NEG^Negative mg/dL 30 (A)   Urobilinogen Urine Latest Ref Range: 0.2 - 1.0 EU/dL 2.0 (H)   Nitrite Urine Latest Ref Range: NEG^Negative  Positive (A)   Blood Urine Latest Ref Range: NEG^Negative  Moderate (A)   Leukocyte Esterase Urine Latest Ref Range: NEG^Negative  Large (A)   Source Unknown Midstream Urine       All pertinent imaging reviewed:    CT urogram    IMPRESSION:    1. Small nonobstructing left renal collecting system stone. No  hydronephrosis or additional urinary tract calculi. No renal cyst or  mass. Bladder is distended but otherwise within normal limits.  2. Stable fluid collection left retroperitoneum possibly posterior  gastric diverticulum or pseudocyst. This is minimally changed compared  to prior study.  3. Findings concerning for cirrhotic liver morphology with slight  nodular contour and a recannulized paraumbilical vein. Borderline  splenomegaly. Follow-up as clinically warranted.             Estimate 1160 ml bladder on CT imaging.    ASSESSMENT and PLAN  50 year old female who presents with a history of recurrent UTI. Did not tolerate methenamine. Today symptomatic, UA with positive nitrites. Cancelling and rescheduling cystoscopy. CTU with no obvious source of infection except for distended bladder. Tiny left sided renal stone, nonobstructive    Send urine culture, treat as indicated  Will keep on abx ppx (likely nitrofurantoin of Bactrim) at least through the rescheduled cystosocpy  Reschedule cystoscopy    *addendum 6/3/2021*  Spoke with patient on the phone. Urine culture results with pan sensitive E. Coli. Bladder distended.  Recommend Nitrofurantoin treatment course followed by daily ppx  Recommend nurse visit for catheterized post void residual, with possible intermittent self cath teaching vs. Napoles catheter placement. Referral for urodynamics. Return for cysto    Lewis Feldman MD   [facility]  [facility]  [facility] Phone: [phone]

## 2021-06-02 LAB
BACTERIA SPEC CULT: ABNORMAL
Lab: ABNORMAL
SPECIMEN SOURCE: ABNORMAL

## 2021-06-03 ENCOUNTER — TELEPHONE (OUTPATIENT)
Dept: UROLOGY | Facility: CLINIC | Age: 51
End: 2021-06-03

## 2021-06-03 ENCOUNTER — TELEPHONE (OUTPATIENT)
Dept: PSYCHIATRY | Facility: CLINIC | Age: 51
End: 2021-06-03

## 2021-06-03 NOTE — TELEPHONE ENCOUNTER
received incoming phone call from the pt. She was immediately tearful on the phone and said she's very depressed. The pt asked to speak with Dr. Rodriguez today or sometime this week to adjust her medications. Writer informed pt that Dr. Rodriguez is out of clinic this week, but writer will reach out to the covering provider for recommendations.     The pt said she's going on day 5 of feeling very depressed. She reports a lack of appetite, interest, and motivation. Her sleep has been disrupted. She also reports ongoing linda symptoms, which include impulsivity and poor decision making. Pt confirmed that she remains in a mixed state. She denies any alcohol use or safety concerns and is unwilling to come into the hospital, as her son is graduating later this week and she plans on attending this. She has a friend that will be coming over this afternoon. The pt contracted for safety and asks that her medications are adjusted to target her depressive symptoms. Unfortunately, she's at the highest dose of Remeron, so she's uncertain what could be adjusted.    Message routed to Dr. Lazaro who is covering for Dr. Rodriguez.

## 2021-06-03 NOTE — TELEPHONE ENCOUNTER
Incoming call from Ana Laura stating she's no longer in crisis. She reports she took PRN vistaril which was extremely helpful. She also reports she is no longer interested in a medication change, she feels she just needs to start utilizing PRNs.     She asks if Dr. Rodriguez could see her any earlier than 6/10. Writer unable to view any sooner appointment times. She states she has a surgical procedure on 6/10.     Will route to RNCC Hattie and covering provider as update.

## 2021-06-03 NOTE — TELEPHONE ENCOUNTER
Nicole Lazaro MD Labossiere, Laura, RN; Sandeep, Sandrine, RN   Caller: Unspecified (Today,  8:29 AM)             I am not going to make any significant changes in her medi cations;  however she could take and extra 5 mg Zyprexa either during the day or at bedtime prn for anxiety or sleep problems.   SS        Pt no longer needs med changes. If patient reports a return in symptoms, will refer to the above recommendation.

## 2021-06-03 NOTE — TELEPHONE ENCOUNTER
[facility] Call Center    Phone Message    May a detailed message be left on voicemail: yes     Reason for Call: Other: Pt would like a call back to discuss having a cath bag and a few f/u questions to that if she does have to have a cath placed     Action Taken: Message routed to:  [facility] Urology    Travel Screening: Not Applicable

## 2021-06-03 NOTE — TELEPHONE ENCOUNTER
[facility] Call Center    Phone Message    May a detailed message be left on voicemail: yes     Reason for Call: Other:  scheduled Pt for a Urodynamic per Dr. Feldman request for Monday 06/07/2021 with Larissa Batista.  Per Sherri,  was told that Pt could have patel catheter placed after Urodynamic appt at [place] rather than at [place].  Pt has questions regarding catheter, the bag, if she needs to journal before Urodynamic, and other questions regarding procedure.  Pt was very pleasant and nice, but also very sad and concerned.  Please call Pt to help her and to answer her questions at .     Action Taken: Message routed to:  [facility] Urology    Travel Screening: Not Applicable

## 2021-06-04 ENCOUNTER — TELEPHONE (OUTPATIENT)
Dept: LAB | Facility: CLINIC | Age: 51
End: 2021-06-04

## 2021-06-04 NOTE — TELEPHONE ENCOUNTER
[facility] Call Center    Phone Message    May a detailed message be left on voicemail: yes     Reason for Call: Other: Pt has several questions regarding catheter, bag, and urodynamic scheduled.  Please call at .     Action Taken: Message routed to:  [facility] Urology    Travel Screening: Not Applicable

## 2021-06-04 NOTE — TELEPHONE ENCOUNTER
Called patient and talked to her at great length and she seemed down and out about this test  I explained what goes on and she kept going back to having a patel  MD's comment is possible self cath teach vs patel  She is really worried about doing that by her self and about supplies Gena Martinez, PAZ Staff Nurse

## 2021-06-04 NOTE — TELEPHONE ENCOUNTER
Called Ana Laura back. Advised she keep cysto appt on 6/25. Answered UDS questions. She also has questions about PAs her insurance company is telling her are needed for UDS and catheter supplies. Will review with provider and check on PA situation.  TORITO Nolen RN       Health Call Center    Phone Message    May a detailed message be left on voicemail: yes     Reason for Call: Other: PT Shannon called about upcoming 6/25 appt w/Dr. Felmdan. Per PT she was not able to get a urodynamic until 8/2, and wanted to know if she should come in for her cysto anyway and asked about her meds, which will run out the same day as the 6/25 appt. Pls call her to discuss.     Action Taken: Other: [facility] URO    Travel Screening: Not Applicable

## 2021-06-07 DIAGNOSIS — F31.9 BIPOLAR I DISORDER (H): ICD-10-CM

## 2021-06-07 NOTE — TELEPHONE ENCOUNTER
Jaden Rodriguez MD Labossiere, Laura, RN   Caller: Unspecified (4 days ago, 12:05 PM)             Wed 6/9 at 8:00 AM        Will ask scheduling department to assist with rescheduling pt's appt.

## 2021-06-08 ENCOUNTER — TELEPHONE (OUTPATIENT)
Dept: UROLOGY | Facility: CLINIC | Age: 51
End: 2021-06-08

## 2021-06-08 NOTE — TELEPHONE ENCOUNTER
Urology pt of Mary Jane Rivera PA-C seen as a new pt 5/20/21. Ana Laura is scheduled for a cysto with Dr. Feldman on 6/25 and UDS 8/2. She reports she was told by her insurance company that  needed to do a prior authorization for the UDS. Called [facility] financial counselor to check on this and spoke with William. He called Ana Laura to discuss this and pt will call William back when it's closer to the date of the test.

## 2021-06-09 DIAGNOSIS — N39.0 URINARY TRACT INFECTION: ICD-10-CM

## 2021-06-09 RX ORDER — NITROFURANTOIN 25; 75 MG/1; MG/1
100 CAPSULE ORAL 2 TIMES DAILY
Qty: 90 CAPSULE | Refills: 0 | Status: SHIPPED | OUTPATIENT
Start: 2021-06-09 | End: 2021-01-01

## 2021-06-09 RX ORDER — MIRTAZAPINE 30 MG/1
TABLET, FILM COATED ORAL
Qty: 180 TABLET | Refills: 3 | OUTPATIENT
Start: 2021-06-09

## 2021-06-10 ENCOUNTER — VIRTUAL VISIT (OUTPATIENT)
Dept: PSYCHIATRY | Facility: CLINIC | Age: 51
End: 2021-06-10
Attending: PSYCHIATRY & NEUROLOGY
Payer: COMMERCIAL

## 2021-06-10 DIAGNOSIS — K21.9 GASTROESOPHAGEAL REFLUX DISEASE WITHOUT ESOPHAGITIS: ICD-10-CM

## 2021-06-10 DIAGNOSIS — F31.9 BIPOLAR I DISORDER (H): ICD-10-CM

## 2021-06-10 PROCEDURE — 99207 PR NON-BILLABLE SERV PER CHARTING: CPT | Mod: 95 | Performed by: PSYCHIATRY & NEUROLOGY

## 2021-06-10 RX ORDER — MIRTAZAPINE 30 MG/1
60 TABLET, FILM COATED ORAL AT BEDTIME
Qty: 180 TABLET | Refills: 1 | Status: SHIPPED | OUTPATIENT
Start: 2021-06-10 | End: 2021-11-02

## 2021-06-10 RX ORDER — HYDROXYZINE PAMOATE 50 MG/1
CAPSULE ORAL
Qty: 270 CAPSULE | Refills: 1 | Status: SHIPPED | OUTPATIENT
Start: 2021-06-10 | End: 2022-01-01

## 2021-06-11 ENCOUNTER — TELEPHONE (OUTPATIENT)
Dept: UROLOGY | Facility: CLINIC | Age: 51
End: 2021-06-11

## 2021-06-11 ENCOUNTER — TELEPHONE (OUTPATIENT)
Dept: PSYCHIATRY | Facility: CLINIC | Age: 51
End: 2021-06-11

## 2021-06-11 DIAGNOSIS — R37 SEXUAL DYSFUNCTION: Primary | ICD-10-CM

## 2021-06-11 RX ORDER — TADALAFIL 20 MG/1
20 TABLET ORAL DAILY PRN
Qty: 90 TABLET | Refills: 0 | Status: SHIPPED | OUTPATIENT
Start: 2021-06-11 | End: 2021-01-01

## 2021-06-11 RX ORDER — PANTOPRAZOLE SODIUM 40 MG/1
TABLET, DELAYED RELEASE ORAL
Qty: 180 TABLET | Refills: 3 | OUTPATIENT
Start: 2021-06-11

## 2021-06-11 NOTE — TELEPHONE ENCOUNTER
Jaden Rodriguez MD  You 13 minutes ago (11:01 AM)     OK    Message text       You  Jaden Rodriguez MD 14 minutes ago (11:00 AM)     She said Levitra will cost about $700. She said Cialis worked in the past, so she's fine with using this again.     Thanks,   Hattie     Message text       Jaden Rodriguez MD  You 34 minutes ago (10:40 AM)     8/13 at 1:00.   OK on the Cialis, but we had spoken about Levitra.  If it is Cialis, 20 mg.          90 d/s of Cialis sent to pt's pharmacy. Called pt and offered 8/13 appt, which she accepted. Message sent to scheduling.

## 2021-06-11 NOTE — TELEPHONE ENCOUNTER
Writer received an incoming phone call from the pt. At yesterday's appt with Dr. Rodriguez, she was instructed to call this writer regarding appointments. The pt was made aware that Dr. Rodriguez is retiring in mid-late Aug. He asked that she schedule a transfer appt with Dr. Andrews. This was scheduled for 8/3. Dr. Rodriguez would also like to meet with her one more time before his [facility]PC. Will reach out to Dr. Rodriguez for dates/times, as his schedule is currently full.    The pt is also requesting a 90 d/s of Cialis to help with sexual dysfunction. She's asking that this is sent to the [facility] in [place]. Will discuss with provider and then send Rx to pharmacy.

## 2021-06-11 NOTE — TELEPHONE ENCOUNTER
Returned phone call to patient and informed her that script was called in on 06/09 for 90 days to pharmacy below. She will contact the pharmacy.     Evelin Martinez LPN

## 2021-06-11 NOTE — TELEPHONE ENCOUNTER
[facility] Call Center    Phone Message    May a detailed message be left on voicemail: yes     Reason for Call: Medication Refill Request    Has the patient contacted the pharmacy for the refill? Yes   Name of medication being requested: nitroFURantoin macrocrystal-monohydrate (MACROBID) 100 MG capsule [[id number]] (Order [id number])  Provider who prescribed the medication: Dr. Feldman  Pharmacy: [facility] [address] [place] [address] [address] [place] [address] DRIVE  Date medication is needed: 6/11/21    Per PT requests 90-day supply of Macrobid due to the rescheduling of her Urodynamic to 8/2 and Cystoscopy to 08/6. Per PT she was only originally prescribed enough to make it to the original 8/25 appts. Pls refill and contact PT to confirm.     Action Taken: Other: [facility]RO    Travel Screening: Not Applicable

## 2021-06-27 ENCOUNTER — MYC MEDICAL ADVICE (OUTPATIENT)
Dept: PSYCHIATRY | Facility: CLINIC | Age: 51
End: 2021-06-27

## 2021-06-27 ENCOUNTER — MYC MEDICAL ADVICE (OUTPATIENT)
Dept: INTERNAL MEDICINE | Facility: CLINIC | Age: 51
End: 2021-06-27

## 2021-06-27 DIAGNOSIS — F31.9 BIPOLAR I DISORDER (H): ICD-10-CM

## 2021-06-28 RX ORDER — CLONAZEPAM 1 MG/1
TABLET ORAL
Qty: 360 TABLET | Refills: 1 | Status: SHIPPED | OUTPATIENT
Start: 2021-06-28 | End: 2021-01-01

## 2021-06-28 RX ORDER — CYCLOBENZAPRINE HCL 10 MG
10 TABLET ORAL AT BEDTIME
Qty: 90 TABLET | Refills: 1 | Status: SHIPPED | OUTPATIENT
Start: 2021-06-28 | End: 2021-09-24

## 2021-06-28 NOTE — TELEPHONE ENCOUNTER
Last seen: 6/10/21 (open)  RTC: not yet documented   Cancel: none  No-show: none  Next appt: 8/3/21 (Bond) and 8/13/21 (Michael)     Medication requested: clonazePAM (KLONOPIN) 1 MG tablet  Directions: Take 1 tablet (1 mg) by mouth 2 times daily AND 2 tablets (2 mg) At Bedtime  Qty: 360  Last refilled: 4/13/21 (#360), 1/27/21 (#360) and 12/9/20 (#120)     Rx pended for 90 d/s with 1 refill and routed to Dr. Rodriguez for approval

## 2021-06-28 NOTE — TELEPHONE ENCOUNTER
Pt using new pharmacy, mail order. For Cyclobenzaprine.     Last OV on 1/18/21    Provider approval needed.

## 2021-07-01 ENCOUNTER — MEDICAL CORRESPONDENCE (OUTPATIENT)
Dept: HEALTH INFORMATION MANAGEMENT | Facility: CLINIC | Age: 51
End: 2021-07-01

## 2021-07-01 ENCOUNTER — TRANSFERRED RECORDS (OUTPATIENT)
Dept: HEALTH INFORMATION MANAGEMENT | Facility: CLINIC | Age: 51
End: 2021-07-01

## 2021-07-02 ENCOUNTER — MEDICAL CORRESPONDENCE (OUTPATIENT)
Dept: HEALTH INFORMATION MANAGEMENT | Facility: CLINIC | Age: 51
End: 2021-07-02
Payer: COMMERCIAL

## 2021-07-02 ENCOUNTER — VIRTUAL VISIT (OUTPATIENT)
Dept: PSYCHIATRY | Facility: CLINIC | Age: 51
End: 2021-07-02
Attending: PSYCHIATRY & NEUROLOGY
Payer: COMMERCIAL

## 2021-07-05 ENCOUNTER — ALLIED HEALTH/NURSE VISIT (OUTPATIENT)
Dept: UROLOGY | Facility: CLINIC | Age: 51
End: 2021-07-05
Payer: COMMERCIAL

## 2021-07-05 DIAGNOSIS — R35.0 URINARY FREQUENCY: Primary | ICD-10-CM

## 2021-07-05 DIAGNOSIS — R30.0 DYSURIA: ICD-10-CM

## 2021-07-05 DIAGNOSIS — N30.00 ACUTE CYSTITIS WITHOUT HEMATURIA: ICD-10-CM

## 2021-07-05 DIAGNOSIS — N39.0 RECURRENT UTI: ICD-10-CM

## 2021-07-05 PROCEDURE — 99211 OFF/OP EST MAY X REQ PHY/QHP: CPT

## 2021-07-05 NOTE — PROGRESS NOTES
Chief Complaint   Patient presents with     Recurrent UTI     Patient here today for Self Cath teach for Incomplete Bladder emptying       not currently breastfeeding. There is no height or weight on file to calculate BMI.    Patient Active Problem List   Diagnosis     Depressed bipolar I disorder in full remission (H)     Anxiety disorder     Suicide ideation     Bipolar disorder current episode depressed (H)     Overdose     Depression     Mood disorder (H)     Suicidal ideations     Bipolar I disorder (H)     Acute renal failure (H)     Alternating exotropia     Anxiety state     Other bipolar disorders     Personality disorder (H)     Tear film insufficiency     Dysfunctions associated with sleep stages or arousal from sleep     Abnormal finding of blood chemistry     Esophageal reflux     Other specified hypothyroidism     Impulse control disorder     Acidosis     Low back pain     Myopia     Opioid dependence (H)     Orofacial dyskinesia     Other migraine without status migrainosus, not intractable     Pure hypercholesterolemia     Poisoning by 4-aminophenol derivatives, undetermined intent     Essential hypertension     Left knee pain     Aftercare following surgery of the musculoskeletal system     UTI (urinary tract infection)     Acute cystitis     Clostridium difficile colitis     Drug overdose     Calcium channel blocker overdose     Intentional acetaminophen overdose (H)     Alcoholic intoxication with complication (H)     Hypotension due to medication     Acute renal failure, unspecified acute renal failure type (H)     Paroxysmal atrial fibrillation (H)     Acute respiratory failure with hypoxia (H)     Acute pulmonary edema (H)     Spontaneous pneumothorax     PRES (posterior reversible encephalopathy syndrome)     Pancreatitis     Atypical squamous cells cannot exclude high grade squamous intraepithelial lesion on cytologic smear of cervix (ASC-H)     Alcohol withdrawal syndrome without  "complication (H)       Allergies   Allergen Reactions     Ciprofloxacin Other (See Comments)     Joint pain cdiff     Compazine [Prochlorperazine] Other (See Comments)     dystonia     Metoclopramide Other (See Comments)     \"I feel like I am crawling out of my skin\"     Reglan [Metoclopramide Hcl] Other (See Comments)     Sensation of \"crawling out of skin\"     Restoril [Temazepam]      Thorazine [Chlorpromazine] Other (See Comments)     dystonia     Abilify [Aripiprazole] Rash     Lamotrigine Rash     Severe drug rash - contraindication to receiving again. Skin peeling.        Current Outpatient Medications   Medication Sig Dispense Refill     acetaminophen (TYLENOL) 500 MG tablet Take 1-2 tablets (500-1,000 mg) by mouth every 6 hours as needed for mild pain       albuterol (PROAIR HFA/PROVENTIL HFA/VENTOLIN HFA) 108 (90 Base) MCG/ACT inhaler Inhale 2 puffs into the lungs every 6 hours as needed for shortness of breath / dyspnea or wheezing 1 Inhaler 0     atorvastatin (LIPITOR) 10 MG tablet TAKE 1 TABLET EVERY DAY 90 tablet 3     carvedilol (COREG) 12.5 MG tablet TAKE 1 TABLET TWICE DAILY WITH MEALS 180 tablet 3     clindamycin (CLEOCIN T) 1 % external lotion Apply topically daily 60 mL 3     clobetasol (TEMOVATE) 0.05 % external cream Apply topically 2 times daily 60 g 1     clonazePAM (KLONOPIN) 1 MG tablet Take 1 tablet (1 mg) by mouth 2 times daily AND 2 tablets (2 mg) At Bedtime. 360 tablet 1     cyclobenzaprine (FLEXERIL) 10 MG tablet Take 1 tablet (10 mg) by mouth At Bedtime 90 tablet 1     Emollient (HYDROPHOR EX) Externally apply topically At Bedtime       Emollient (HYDROPHOR) OINT Externally apply topically daily 454 g 3     fluticasone (FLONASE) 50 MCG/ACT nasal spray SHAKE LIQUID AND USE 1 SPRAY IN EACH NOSTRIL DAILY 48 g 1     folic acid (FOLVITE) 400 MCG tablet Take 400 mcg by mouth daily       hydrOXYzine (VISTARIL) 50 MG capsule Take 1 capsule (50 mg) by mouth 2 times daily. May also take 1 " "capsule (50 mg) daily as needed for anxiety. 270 capsule 1     ketorolac (TORADOL) 30 MG/ML injection every 7 days       levothyroxine (SYNTHROID/LEVOTHROID) 50 MCG tablet TAKE 1 TABLET EVERY DAY 90 tablet 3     mirtazapine (REMERON) 30 MG tablet Take 2 tablets (60 mg) by mouth At Bedtime 180 tablet 1     Multiple Vitamins-Minerals (WOMENS MULTI VITAMIN & MINERAL PO) Take 1 tablet by mouth daily       nitroFURantoin macrocrystal-monohydrate (MACROBID) 100 MG capsule Take 1 capsule (100 mg) by mouth 2 times daily Take one pill bid x 7days   Then decrease to one daily  until follow up appointment 90 capsule 0     norethindrone (MICRONOR) 0.35 MG tablet Take 1 tablet (0.35 mg) by mouth daily continuously 90 tablet 4     OLANZapine (ZYPREXA) 10 MG tablet Take 1 tablet (10 mg) by mouth At Bedtime (Patient taking differently: Take 20 mg by mouth At Bedtime ) 90 tablet 0     OLANZapine (ZYPREXA) 5 MG tablet Take 1 tab each AM and 1 tab at bedtime. 180 tablet 0     ondansetron (ZOFRAN) 4 MG tablet Take 1 tablet (4 mg) by mouth every 8 hours as needed for nausea 30 tablet 0     pantoprazole (PROTONIX) 40 MG EC tablet Take 1 tablet (40 mg) by mouth 2 times daily 90 tablet 4     polyethylene glycol (MIRALAX) 17 g packet Take 1 packet by mouth daily as needed for constipation       Sennosides-Docusate Sodium (SENNA S PO)        sodium chloride (OCEAN) 0.65 % nasal spray Use in both nostrils 3 times a day. 30 mL 1     sulfamethoxazole-trimethoprim (BACTRIM DS) 800-160 MG tablet Take 1 tablet by mouth 2 times daily 14 tablet 0     Syringe/Needle, Disp, (SYRINGE LUER LOCK) 25G X 1\" 3 ML MISC 1 Act once a week 10 each 0     tadalafil (CIALIS) 20 MG tablet Take 1 tablet (20 mg) by mouth daily as needed (sexual dysfunction) 90 tablet 0       Social History     Tobacco Use     Smoking status: Current Every Day Smoker     Packs/day: 1.00     Types: Cigarettes     Smokeless tobacco: Never Used     Tobacco comment: pt states she is " quiting and declined resources   Substance Use Topics     Alcohol use: Not Currently     Alcohol/week: 2.0 standard drinks     Types: 2 Glasses of wine per week     Comment: None     Drug use: Yes     Frequency: 0.5 times per week     Types: Marijuana       Following clinic protocol I instructed Ana Laura Wharton in [facility]. Ana Laura was able to demonstrate good hand hygiene and genital hygiene. Ana Laura was able to self catheterize without  difficulty using a 14fr intermittent Urinary Catheter Striaght tip catheter for Female Bard.    [facility] 3 Go  14Fr/  Ref 01623  Female straight Catheter    Ana Laura Wharton comes into clinic today at the request of Dr Feldman Ordering Provider for Pt Teaching Cath Teach self Cath.        This service provided today was under the supervising provider of the day Yomaira Balderrama, who was available if needed.      ASHLEY Brooks  7/5/2021  2:35 PM     Patient call in today 07/06/2021 spoke with Evelin and said she don't to self cath. Call Bard today and cancel order.    ASHLEY Brooks  07/06/2021  11:05AM

## 2021-07-06 ENCOUNTER — TELEPHONE (OUTPATIENT)
Dept: UROLOGY | Facility: CLINIC | Age: 51
End: 2021-07-06

## 2021-07-06 NOTE — TELEPHONE ENCOUNTER
Patient called nurse line and reported that she was able to pass the catheter the first couple times, but now is having a hard time catheterizing. Informed her that it may take a learning period to get used to cathing herself. Patient is not interested in trying any more and is also not interested in having a catheter placed per MD's notes. She requests that order for catheters be cancelled. Notified Nurse who taught patient [facility]IC.     Evelin Martinez LPN

## 2021-07-07 DIAGNOSIS — K21.9 GASTROESOPHAGEAL REFLUX DISEASE WITHOUT ESOPHAGITIS: ICD-10-CM

## 2021-07-08 RX ORDER — PANTOPRAZOLE SODIUM 40 MG/1
TABLET, DELAYED RELEASE ORAL
Qty: 90 TABLET | Refills: 7 | OUTPATIENT
Start: 2021-07-08

## 2021-07-09 ENCOUNTER — TELEPHONE (OUTPATIENT)
Dept: UROLOGY | Facility: CLINIC | Age: 51
End: 2021-07-09

## 2021-07-09 NOTE — TELEPHONE ENCOUNTER
Telephone    7/9/2021  [facility] [place]   Lewis Feldman MD  Urology  Call Back    Reason for call   Conversation: Call Back  (Newest Message First)  Lewis Feldman MD         7/9/21 1:05 PM  Note     49 yo F with incomplete bladder emptying.  Recommend intermittent self catheterization 4 times a day after patient voids. Keep a record of post void residuals. Follow up as scheduled for urodynamics followed by cystoscopy     Lewis Feldman MD   [facility]  [phone] clinic phone                  7/9/21 8:07 AM  Kiarra Nettles routed this conversation to  Clinical Pool   Kiarra Nettles         7/9/21 8:07 AM  Note     [facility]     Phone Message     May a detailed message be left on voicemail: yes      Reason for Call: Other: Pt would like a call back to discuss getting new caths ordered as she tried one that worked for her. She wouldl like a call back to discuss and have these ordered      Action Taken: Message routed to:  [facility]     Travel Screening: Not Applicable                                                                                                                                                                                                                                                                                                                                                                                                                                                                                                                                                                                                                                                                                                7/9/21 8:06 AM    Ana Laura Wharton contacted Kiarra Nettles    Additional Documentation    Encounter Info:    Billing Info,   History,   Allergies,   Detailed Report      SmartForms     [facility] Pharmacy Call  Tracking   Orders Placed     None  Medication Renewals and Changes       None     Medication List   Visit Diagnoses       None     Problem List

## 2021-07-09 NOTE — TELEPHONE ENCOUNTER
49 yo F with incomplete bladder emptying.  Recommend intermittent self catheterization 4 times a day after patient voids. Keep a record of post void residuals. Follow up as scheduled for urodynamics followed by cystoscopy    Lewis Feldman MD   [facility]  [phone] clinic phone

## 2021-07-09 NOTE — TELEPHONE ENCOUNTER
[facility] Call Center    Phone Message    May a detailed message be left on voicemail: yes     Reason for Call: Other: Pt would like a call back to discuss getting new caths ordered as she tried one that worked for her. She wouldl like a call back to discuss and have these ordered     Action Taken: Message routed to:  [facility]    Travel Screening: Not Applicable

## 2021-07-15 DIAGNOSIS — K76.9 CHRONIC LIVER DISEASE: Primary | ICD-10-CM

## 2021-07-16 ENCOUNTER — ANCILLARY PROCEDURE (OUTPATIENT)
Dept: MRI IMAGING | Facility: CLINIC | Age: 51
End: 2021-07-16
Attending: INTERNAL MEDICINE
Payer: COMMERCIAL

## 2021-07-16 ENCOUNTER — LAB (OUTPATIENT)
Dept: LAB | Facility: CLINIC | Age: 51
End: 2021-07-16

## 2021-07-16 DIAGNOSIS — K76.9 CHRONIC LIVER DISEASE: Primary | ICD-10-CM

## 2021-07-16 DIAGNOSIS — K76.9 CHRONIC LIVER DISEASE: ICD-10-CM

## 2021-07-16 LAB
ALBUMIN SERPL-MCNC: 4.3 G/DL (ref 3.4–5)
ALP SERPL-CCNC: 172 U/L (ref 40–150)
ALT SERPL W P-5'-P-CCNC: 38 U/L (ref 0–50)
AST SERPL W P-5'-P-CCNC: 54 U/L (ref 0–45)
BILIRUB DIRECT SERPL-MCNC: 0.2 MG/DL (ref 0–0.2)
BILIRUB SERPL-MCNC: 0.4 MG/DL (ref 0.2–1.3)
CREAT SERPL-MCNC: 0.84 MG/DL (ref 0.52–1.04)
ERYTHROCYTE [DISTWIDTH] IN BLOOD BY AUTOMATED COUNT: 13.9 % (ref 10–15)
FERRITIN SERPL-MCNC: 74 NG/ML (ref 8–252)
GFR SERPL CREATININE-BSD FRML MDRD: 81 ML/MIN/1.73M2
HCT VFR BLD AUTO: 47.4 % (ref 35–47)
HGB BLD-MCNC: 16.3 G/DL (ref 11.7–15.7)
HOLD SPECIMEN: NORMAL
INR PPP: 1.14 (ref 0.85–1.15)
IRON SATN MFR SERPL: 30 % (ref 15–46)
IRON SERPL-MCNC: 118 UG/DL (ref 35–180)
MCH RBC QN AUTO: 31.3 PG (ref 26.5–33)
MCHC RBC AUTO-ENTMCNC: 34.4 G/DL (ref 31.5–36.5)
MCV RBC AUTO: 91 FL (ref 78–100)
PLATELET # BLD AUTO: 242 10E3/UL (ref 150–450)
PROT SERPL-MCNC: 8.6 G/DL (ref 6.8–8.8)
RBC # BLD AUTO: 5.2 10E6/UL (ref 3.8–5.2)
TIBC SERPL-MCNC: 388 UG/DL (ref 240–430)
WBC # BLD AUTO: 12.9 10E3/UL (ref 4–11)

## 2021-07-16 PROCEDURE — 74181 MRI ABDOMEN W/O CONTRAST: CPT | Performed by: RADIOLOGY

## 2021-07-16 PROCEDURE — 82103 ALPHA-1-ANTITRYPSIN TOTAL: CPT | Mod: 90 | Performed by: PATHOLOGY

## 2021-07-16 PROCEDURE — 82104 ALPHA-1-ANTITRYPSIN PHENO: CPT | Mod: 90 | Performed by: PATHOLOGY

## 2021-07-16 PROCEDURE — 82728 ASSAY OF FERRITIN: CPT | Performed by: PATHOLOGY

## 2021-07-16 PROCEDURE — 82565 ASSAY OF CREATININE: CPT | Performed by: PATHOLOGY

## 2021-07-16 PROCEDURE — 85610 PROTHROMBIN TIME: CPT | Performed by: PATHOLOGY

## 2021-07-16 PROCEDURE — 80076 HEPATIC FUNCTION PANEL: CPT | Performed by: PATHOLOGY

## 2021-07-16 PROCEDURE — 36415 COLL VENOUS BLD VENIPUNCTURE: CPT | Performed by: PATHOLOGY

## 2021-07-16 PROCEDURE — 85027 COMPLETE CBC AUTOMATED: CPT | Performed by: PATHOLOGY

## 2021-07-16 PROCEDURE — 83550 IRON BINDING TEST: CPT | Performed by: PATHOLOGY

## 2021-07-17 ENCOUNTER — TELEPHONE (OUTPATIENT)
Dept: NURSING | Facility: CLINIC | Age: 51
End: 2021-07-17

## 2021-07-17 ENCOUNTER — DOCUMENTATION ONLY (OUTPATIENT)
Dept: UROLOGY | Facility: CLINIC | Age: 51
End: 2021-07-17

## 2021-07-17 NOTE — PROGRESS NOTES
51 yo F with incomplete bladder emptying. Recommend intermittent self catheterization 4 times a day after patient voids. She will need to do this for at least the next 3 months. Will reassess whether this will be an indefinite need after urodynamic studies are completed    Lewis Feldman MD   [facility]  [phone] clinic phone

## 2021-07-17 NOTE — TELEPHONE ENCOUNTER
Patient calls inquiring about her MRI. Her appointment with hepatologist isn't til October and she wants answers. This writer suggested contacting MD thru Cat, Patient verbalized understanding and agrees with plan.     Vania Camacho RN  [facility] Nurse Advisor  12:50 PM 7/17/2021

## 2021-07-21 ENCOUNTER — LAB (OUTPATIENT)
Dept: LAB | Facility: CLINIC | Age: 51
End: 2021-07-21
Payer: COMMERCIAL

## 2021-07-21 ENCOUNTER — TELEPHONE (OUTPATIENT)
Dept: UROLOGY | Facility: CLINIC | Age: 51
End: 2021-07-21

## 2021-07-21 DIAGNOSIS — R39.89 POSSIBLE URINARY TRACT INFECTION: Primary | ICD-10-CM

## 2021-07-21 DIAGNOSIS — R39.89 POSSIBLE URINARY TRACT INFECTION: ICD-10-CM

## 2021-07-21 LAB
A1AT PHENOTYP SERPL-IMP: NORMAL
A1AT SERPL-MCNC: 177 MG/DL
ALBUMIN UR-MCNC: NEGATIVE MG/DL
APPEARANCE UR: CLEAR
BILIRUB UR QL STRIP: NEGATIVE
COLOR UR AUTO: YELLOW
GLUCOSE UR STRIP-MCNC: NEGATIVE MG/DL
HGB UR QL STRIP: NEGATIVE
KETONES UR STRIP-MCNC: NEGATIVE MG/DL
LEUKOCYTE ESTERASE UR QL STRIP: NEGATIVE
NITRATE UR QL: NEGATIVE
PH UR STRIP: 6 [PH] (ref 5–7)
SP GR UR STRIP: <=1.005 (ref 1–1.03)
UROBILINOGEN UR STRIP-ACNC: 0.2 E.U./DL

## 2021-07-21 PROCEDURE — 81003 URINALYSIS AUTO W/O SCOPE: CPT | Mod: QW

## 2021-07-21 PROCEDURE — 87086 URINE CULTURE/COLONY COUNT: CPT

## 2021-07-21 NOTE — TELEPHONE ENCOUNTER
FUTURE [facility] ORDERS ARE IN Epic.  PT SX: PAIN IN BLADDER.  PT WILL GO DO A [facility] TODAY.  PT NOTIFIED ORDERS ARE IN.  KENDAL Smith CMA        ==============================  [facility] Call Center    Phone Message    May a detailed message be left on voicemail: yes     Reason for Call: Order(s): Other:   Reason for requested: UA+UC - PT Shannon called stating she believes she has bladder infection, Per PT requests UA+UC from Dr. Feldman for today so she can be tested. Per PT pls call her to confirm orders are issued.  Date needed: 07/21/21  Provider name: Dr. Feldman      Action Taken: Other: UA URO    Travel Screening: Not Applicable

## 2021-07-22 ENCOUNTER — PRE VISIT (OUTPATIENT)
Dept: UROLOGY | Facility: CLINIC | Age: 51
End: 2021-07-22

## 2021-07-22 LAB — BACTERIA UR CULT: NO GROWTH

## 2021-07-23 DIAGNOSIS — K21.9 GASTROESOPHAGEAL REFLUX DISEASE WITHOUT ESOPHAGITIS: ICD-10-CM

## 2021-07-26 RX ORDER — PANTOPRAZOLE SODIUM 40 MG/1
TABLET, DELAYED RELEASE ORAL
Qty: 180 TABLET | Refills: 1 | Status: SHIPPED | OUTPATIENT
Start: 2021-07-26 | End: 2021-01-01

## 2021-07-26 NOTE — TELEPHONE ENCOUNTER
Pending Prescriptions:                       Disp   Refills    pantoprazole (PROTONIX) 40 MG EC tablet [*180 ta*1            Sig: TAKE 1 TABLET BY MOUTH  TWICE DAILY    Prescription approved per [facility] Refill Protocol.

## 2021-08-02 ENCOUNTER — ANCILLARY PROCEDURE (OUTPATIENT)
Dept: RADIOLOGY | Facility: AMBULATORY SURGERY CENTER | Age: 51
End: 2021-08-02
Attending: NURSE PRACTITIONER
Payer: COMMERCIAL

## 2021-08-02 ENCOUNTER — ALLIED HEALTH/NURSE VISIT (OUTPATIENT)
Dept: UROLOGY | Facility: CLINIC | Age: 51
End: 2021-08-02
Payer: COMMERCIAL

## 2021-08-02 VITALS — SYSTOLIC BLOOD PRESSURE: 114 MMHG | HEART RATE: 84 BPM | DIASTOLIC BLOOD PRESSURE: 77 MMHG

## 2021-08-02 DIAGNOSIS — R35.0 URINARY FREQUENCY: Primary | ICD-10-CM

## 2021-08-02 DIAGNOSIS — N39.0 RECURRENT UTI: ICD-10-CM

## 2021-08-02 DIAGNOSIS — R33.9 INCOMPLETE BLADDER EMPTYING: ICD-10-CM

## 2021-08-02 PROCEDURE — 51741 ELECTRO-UROFLOWMETRY FIRST: CPT | Performed by: NURSE PRACTITIONER

## 2021-08-02 PROCEDURE — 51728 CYSTOMETROGRAM W/VP: CPT | Performed by: NURSE PRACTITIONER

## 2021-08-02 PROCEDURE — 74455 X-RAY URETHRA/BLADDER: CPT | Performed by: NURSE PRACTITIONER

## 2021-08-02 PROCEDURE — 51600 INJECTION FOR BLADDER X-RAY: CPT | Performed by: NURSE PRACTITIONER

## 2021-08-02 PROCEDURE — 51784 ANAL/URINARY MUSCLE STUDY: CPT | Performed by: NURSE PRACTITIONER

## 2021-08-02 PROCEDURE — 51797 INTRAABDOMINAL PRESSURE TEST: CPT | Performed by: NURSE PRACTITIONER

## 2021-08-02 PROCEDURE — 81003 URINALYSIS AUTO W/O SCOPE: CPT | Performed by: NURSE PRACTITIONER

## 2021-08-02 ASSESSMENT — PAIN SCALES - GENERAL: PAINLEVEL: NO PAIN (0)

## 2021-08-02 NOTE — PATIENT INSTRUCTIONS
Follow up with Dr. Feldman as scheduled.    It was a pleasure meeting with you today.  Thank you for allowing me and my team the privilege of caring for you today.  YOU are the reason we are here, and I truly hope we provided you with the excellent service you deserve.  Please let us know if there is anything else we can do for you so that we can be sure you are leaving completely satisfied with your care experience.        Anna Duarte, CMA

## 2021-08-02 NOTE — PROGRESS NOTES
PREPROCEDURE DIAGNOSES:    1. Recurrent UTIs  2. Incomplete bladder emptying    POSTPROCEDURE DIAGNOSES:  -Large bladder capacity (1239 mL) with diminished filling sensations.  -Good bladder compliance without DO/DOI.  -No LEANDRA.  -Very weak detrusor contraction during voiding to max Pdet 8.5 cm H2O.  -Brisk flow rate (Qmax 39 mL/s) with a bell-shaped flow curve and incomplete bladder emptying (final  mL).  -Increased EMG activity during voiding.  -BOOI is -71.1 which is negative for bladder outlet obstruction.  -Fluoroscopy reveals  a smooth bladder wall without diverticulae or cellules.  No vesicoureteral reflux was observed.  The bladder neck was closed during filling and open during voiding.     PROCEDURE:    -Uroflowmetry.  -Sterile urethral catheterization for measurement of postvoid residual urine volume.  -Complex filling cystometrogram with measurement of bladder and rectal pressures.  -Complex voiding cystometrogram with measurement of bladder and rectal pressures.  -Electromyography of the pelvic floor during urodynamics.  -Fluoroscopic imaging of the bladder during urodynamics, at least 3 views.    -Interpretation of urodynamics and flouroscopic imaging.      INDICATIONS FOR PROCEDURE:  Ms. Ana Laura Wharton is a pleasant 50 year old female with recurrent UTIs and incomplete bladder emptying. Baseline video urodynamic assessment is requested today by Dr. Feldman to better characterize Ms. Ana Laura Wharton's voiding dysfunction.      DESCRIPTION OF PROCEDURE:  Risks, benefits, and alternatives to urodynamics were discussed with the patient and she wished to proceed.  Urodynamics are planned to better assess the primary etiology for Ms. Sepulvedas urologic dysfunction.   After informed consent was obtained, the patient was taken to the procedure room where the study was initiated. Findings below.     PRE-STUDY UROFLOWMETRY:  Postvoid residual by catheter: 590 mL.  Pretest urine dipstick was negative for  leukocytes and nitrites.    Next a 7F double-lumen urodynamics catheter was inserted into the bladder under sterile technique via the urethra.  A 7F abdominal manometry catheter was placed in the rectum.  EMG pads were placed on both sides of the anal verge.  The bladder was filled with 200 mL of Omnipaque at 30 mL/minute and serial pressures were recorded.  With coughing there was an appropriate rise in vesical and abdominal pressures with no change in detrusor pressure, confirming good study catheter placement.    DURING THE FILLING PHASE:  First sensation: 665 mL.  First Desire: 730 mL.  Maximum Capacity (based on final catheterized volume): 1239 mL.    Uninhibited detrusor contractions: None.  Compliance: Good. PDet=0 cmH20 at capacity.  Continence: No DOI or LEANDRA.  EMG: Quiet during filling.    DURING THE VOIDING PHASE:  Maximum detrusor contraction with void: 8.5 cm of H2O pressure.  Voided volume: 519 mL.  Maximum flow rate: 39 mL/sec.  Average flow rate: 16 mL/sec.  Postvoid Residual: 720 mL.  EMG activity: Increased.  Character of voiding curve: Bell-shaped.  BOOI: -71.1 (suggesting no obstruction - see key below)  [obstructed (MCKEON index [BOOI] ? 40); equivocal (no definite   obstruction; BOOI 20-40); and no obstruction (BOOI ? 20)]    FLUOROSCOPIC IMAGING OF THE BLADDER DURING URODYNAMICS:  Please note, image numbers on UDS tracings correlate with iSite series numbers on PACS images. Fluoroscopy during today's procedure demonstrated a smooth bladder wall without diverticulae or cellules.  No vesicoureteral reflux was observed.  The bladder neck was closed during filling and open during voiding.  At the completion of the study, all catheters were removed and the patient was brought back into the consultation room to further discuss today's study results.      ASSESSMENT/PLAN:  Ms. Ana Laura Wharton is a pleasant 50 year old female with recurrent UTIs and incomplete bladder emptying who demonstrated the  following findings today on urodynamic evaluation:    -Large bladder capacity (1239 mL) with diminished filling sensations.  -Good bladder compliance without DO/DOI.  -No LEANDRA.  -Very weak detrusor contraction during voiding to max Pdet 8.5 cm H2O.  -Brisk flow rate (Qmax 39 mL/s) with a bell-shaped flow curve and incomplete bladder emptying (final  mL).  -Increased EMG activity during voiding.  -BOOI is -71.1 which is negative for bladder outlet obstruction.  -Fluoroscopy reveals  a smooth bladder wall without diverticulae or cellules.  No vesicoureteral reflux was observed.  The bladder neck was closed during filling and open during voiding.     The patient will follow up as scheduled with Dr. Feldman to further discuss today's study results and make plans for how best to proceed.     Thank you for allowing me to participate in the care of Ms. Ana Laura Wharton and please don't hesitate to contact me with any questions or concerns.      This procedure was performed under a collaborative agreement with Dr. Marcelo Lund, Professor and  of Urology, [facility].    AKILAH Steven, CNP  Department of Urology

## 2021-08-02 NOTE — NURSING NOTE
Chief Complaint   Patient presents with     Urodynamics Study     Urinary frequency/Recurrent UTI       Blood pressure 114/77, pulse 84, not currently breastfeeding.     Patient Active Problem List   Diagnosis     Depressed bipolar I disorder in full remission (H)     Anxiety disorder     Suicide ideation     Bipolar disorder current episode depressed (H)     Overdose     Depression     Mood disorder (H)     Suicidal ideations     Bipolar I disorder (H)     Acute renal failure (H)     Alternating exotropia     Anxiety state     Other bipolar disorders     Personality disorder (H)     Tear film insufficiency     Dysfunctions associated with sleep stages or arousal from sleep     Abnormal finding of blood chemistry     Esophageal reflux     Other specified hypothyroidism     Impulse control disorder     Acidosis     Low back pain     Myopia     Opioid dependence (H)     Orofacial dyskinesia     Other migraine without status migrainosus, not intractable     Pure hypercholesterolemia     Poisoning by 4-aminophenol derivatives, undetermined intent     Essential hypertension     Left knee pain     Aftercare following surgery of the musculoskeletal system     UTI (urinary tract infection)     Acute cystitis     Clostridium difficile colitis     Drug overdose     Calcium channel blocker overdose     Intentional acetaminophen overdose (H)     Alcoholic intoxication with complication (H)     Hypotension due to medication     Acute renal failure, unspecified acute renal failure type (H)     Paroxysmal atrial fibrillation (H)     Acute respiratory failure with hypoxia (H)     Acute pulmonary edema (H)     Spontaneous pneumothorax     PRES (posterior reversible encephalopathy syndrome)     Pancreatitis     Atypical squamous cells cannot exclude high grade squamous intraepithelial lesion on cytologic smear of cervix (ASC-H)     Alcohol withdrawal syndrome without complication (H)       Allergies   Allergen Reactions      "Ciprofloxacin Other (See Comments)     Joint pain cdiff     Compazine [Prochlorperazine] Other (See Comments)     dystonia     Metoclopramide Other (See Comments)     \"I feel like I am crawling out of my skin\"     Reglan [Metoclopramide Hcl] Other (See Comments)     Sensation of \"crawling out of skin\"     Restoril [Temazepam]      Thorazine [Chlorpromazine] Other (See Comments)     dystonia     Abilify [Aripiprazole] Rash     Lamotrigine Rash     Severe drug rash - contraindication to receiving again. Skin peeling.        Current Outpatient Medications   Medication Sig Dispense Refill     acetaminophen (TYLENOL) 500 MG tablet Take 1-2 tablets (500-1,000 mg) by mouth every 6 hours as needed for mild pain       albuterol (PROAIR HFA/PROVENTIL HFA/VENTOLIN HFA) 108 (90 Base) MCG/ACT inhaler Inhale 2 puffs into the lungs every 6 hours as needed for shortness of breath / dyspnea or wheezing 1 Inhaler 0     atorvastatin (LIPITOR) 10 MG tablet TAKE 1 TABLET EVERY DAY 90 tablet 3     carvedilol (COREG) 12.5 MG tablet TAKE 1 TABLET TWICE DAILY WITH MEALS 180 tablet 3     clindamycin (CLEOCIN T) 1 % external lotion Apply topically daily 60 mL 3     clobetasol (TEMOVATE) 0.05 % external cream Apply topically 2 times daily 60 g 1     clonazePAM (KLONOPIN) 1 MG tablet Take 1 tablet (1 mg) by mouth 2 times daily AND 2 tablets (2 mg) At Bedtime. 360 tablet 1     cyclobenzaprine (FLEXERIL) 10 MG tablet Take 1 tablet (10 mg) by mouth At Bedtime 90 tablet 1     Emollient (HYDROPHOR EX) Externally apply topically At Bedtime       Emollient (HYDROPHOR) OINT Externally apply topically daily 454 g 3     fluticasone (FLONASE) 50 MCG/ACT nasal spray SHAKE LIQUID AND USE 1 SPRAY IN EACH NOSTRIL DAILY 48 g 1     folic acid (FOLVITE) 400 MCG tablet Take 400 mcg by mouth daily       hydrOXYzine (VISTARIL) 50 MG capsule Take 1 capsule (50 mg) by mouth 2 times daily. May also take 1 capsule (50 mg) daily as needed for anxiety. 270 capsule 1     " "ketorolac (TORADOL) 30 MG/ML injection every 7 days       levothyroxine (SYNTHROID/LEVOTHROID) 50 MCG tablet TAKE 1 TABLET EVERY DAY 90 tablet 3     mirtazapine (REMERON) 30 MG tablet Take 2 tablets (60 mg) by mouth At Bedtime 180 tablet 1     Multiple Vitamins-Minerals (WOMENS MULTI VITAMIN & MINERAL PO) Take 1 tablet by mouth daily       nitroFURantoin macrocrystal-monohydrate (MACROBID) 100 MG capsule Take 1 capsule (100 mg) by mouth 2 times daily Take one pill bid x 7days   Then decrease to one daily  until follow up appointment 90 capsule 0     norethindrone (MICRONOR) 0.35 MG tablet Take 1 tablet (0.35 mg) by mouth daily continuously 90 tablet 4     OLANZapine (ZYPREXA) 10 MG tablet Take 1 tablet (10 mg) by mouth At Bedtime (Patient taking differently: Take 20 mg by mouth At Bedtime ) 90 tablet 0     OLANZapine (ZYPREXA) 5 MG tablet Take 1 tab each AM and 1 tab at bedtime. 180 tablet 0     ondansetron (ZOFRAN) 4 MG tablet Take 1 tablet (4 mg) by mouth every 8 hours as needed for nausea 30 tablet 0     pantoprazole (PROTONIX) 40 MG EC tablet TAKE 1 TABLET BY MOUTH  TWICE DAILY 180 tablet 1     polyethylene glycol (MIRALAX) 17 g packet Take 1 packet by mouth daily as needed for constipation       Sennosides-Docusate Sodium (SENNA S PO)        sodium chloride (OCEAN) 0.65 % nasal spray Use in both nostrils 3 times a day. 30 mL 1     sulfamethoxazole-trimethoprim (BACTRIM DS) 800-160 MG tablet Take 1 tablet by mouth 2 times daily 14 tablet 0     Syringe/Needle, Disp, (SYRINGE LUER LOCK) 25G X 1\" 3 ML MISC 1 Act once a week 10 each 0     tadalafil (CIALIS) 20 MG tablet Take 1 tablet (20 mg) by mouth daily as needed (sexual dysfunction) 90 tablet 0       Social History     Tobacco Use     Smoking status: Current Every Day Smoker     Packs/day: 1.00     Types: Cigarettes     Smokeless tobacco: Never Used     Tobacco comment: pt states she is quiting and declined resources   Substance Use Topics     Alcohol use: Not " Currently     Alcohol/week: 2.0 standard drinks     Types: 2 Glasses of wine per week     Comment: None     Drug use: Yes     Frequency: 0.5 times per week     Types: Marijuana       Invasive Procedure Safety Checklist:    Procedure: Urodynamics    Action: Complete sections and checkboxes as appropriate.  Pre-procedure:  1. Patient ID Verified with 2 identifiers (Melyssa and  or MRN) : YES    2. Procedure and site verified with patient/designee (when able) : YES    3. Accurate consent documentation in medical record : YES    4. H&P (or appropriate assessment) documented in medical record : N/A  H&P must be up to 30 days prior to procedure an updated within 24 hours of Procedure as applicable.     5. Relevant diagnostic and radiology test results appropriately labeled and displayed as applicable : YES    6. Blood products, implants, devices, and/or special equipment available for the procedure as applicable : YES    7. Procedure site(s) marked with provider initials [Exclusions: none] : NO    8. Marking not required. Reason : Yes  Procedure does not require site marking    Time Out:     Time-Out performed immediately prior to starting procedure, including verbal and active participation of all team members addressing: YES    1. Correct patient identity.  2. Confirmed that the correct side and site are marked.  3. An accurate procedure to be done.  4. Agreement on the procedure to be done.  5. Correct patient position.  6. Relevant images and results are properly labeled and appropriately displayed.  7. The need to administer antibiotics or fluids for irrigation purposes during the procedure as applicable.  8. Safety precautions based on patient history or medication use.    During Procedure: Verification of correct person, site, and procedure occurs any time the responsibility for care of the patient is transferred to another member of the care team.      The following medication was given:     MEDICATION:  Omnipaque  (Iohexol Injection) (240mgI/mL)  ROUTE: Provider Administered  SITE: Provider Administered via catheter  DOSE: 200mL  LOT #: [id number]  : CultureMap  EXPIRATION DATE: 1/22/2024  NDC#: [address]-85   Was there drug waste? No      Anna Duarte CMA  8/2/2021  1:07 PM

## 2021-08-03 ENCOUNTER — VIRTUAL VISIT (OUTPATIENT)
Dept: PSYCHIATRY | Facility: CLINIC | Age: 51
End: 2021-08-03
Attending: PSYCHIATRY & NEUROLOGY
Payer: COMMERCIAL

## 2021-08-03 DIAGNOSIS — F31.32 BIPOLAR AFFECTIVE DISORDER, CURRENTLY DEPRESSED, MODERATE (H): Primary | ICD-10-CM

## 2021-08-03 PROCEDURE — 99443 PR PHYSICIAN TELEPHONE EVALUATION 21-30 MIN: CPT | Performed by: PSYCHIATRY & NEUROLOGY

## 2021-08-03 NOTE — PATIENT INSTRUCTIONS
Continue your medications at the current doses      **For crisis resources, please see the information at the end of this document**     Patient Education      Thank you for coming to the [facility] [place] [facility].    Lab Testing:  If you had lab testing today and your results are reassuring or normal they will be mailed to you or sent through [employer] within 7 days. If the lab tests need quick action we will call you with the results. The phone number we will call with results is # [phone] (home) . If this is not the best number please call our clinic and change the number.    Medication Refills:  If you need any refills please call your pharmacy and they will contact us. Our fax number for refills is [phone]. Please allow three business for refill processing. If you need to  your refill at a new pharmacy, please contact the new pharmacy directly. The new pharmacy will help you get your medications transferred.     Scheduling:  If you have any concerns about today's visit or wish to schedule another appointment please call our office during normal business hours [phone] (8-5:00 M-F)    Contact Us:  Please call [phone] during business hours (8-5:00 M-F).  If after clinic hours, or on the weekend, please call  [phone].    Financial Assistance [phone]  [facility] Billing [phone]  Central Billing Office, [facility]: [phone]  [facility] Billing [phone]  Medical Records [phone]  [facility] Patient Bill of Rights [email or web address]       MENTAL HEALTH CRISIS NUMBERS:  For a medical emergency please call  911 or go to the nearest ER.     [facility]:   [facility] -[phone]   Crisis Residence [facility] -[phone]   Walk-In Counseling Center [facility] -[phone]   COPE 24/7 [place] Mobile Team -[phone] (adults)/[phone]  (child)  CHILD: [facility] needs assessment team - [phone]      [facility]:   [facility] - [phone]   Walk-in counseling [facility] - [phone]   Walk-in counseling [facility] - [phone]   Crisis Residence [facility] - [phone]  Urgent Care Adult Mental [facility]-[phone] mobile unit/ 24/7 crisis line    National Crisis Numbers:   National Suicide Prevention Lifeline: 0-197-678-TALK (967-045-3475)  Poison Control Center - 3-821-058-5900  BadAbroad/resources for a list of additional resources (SOS)  Trans Lifeline a hotline for transgender people 2-132-750-8396  The [employer] a hotline for LGBT youth 3-822-050-6586  Crisis Text Line: For any crisis 24/7   To: 841104  see www.crisistextline.org  - IF MAKING A CALL FEELS TOO HARD, send a text!         Again thank you for choosing [facility] and please let us know how we can best partner with you to improve you and your family's health.    You may be receiving a survey regarding this appointment. We would love to have your feedback, both positive and negative. The survey is done by an external company, so your answers are anonymous.
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
## 2021-08-04 ENCOUNTER — TELEPHONE (OUTPATIENT)
Dept: OBGYN | Facility: CLINIC | Age: 51
End: 2021-08-04

## 2021-08-04 DIAGNOSIS — F31.9 BIPOLAR I DISORDER (H): ICD-10-CM

## 2021-08-04 RX ORDER — OLANZAPINE 5 MG/1
TABLET ORAL
Qty: 180 TABLET | Refills: 0 | Status: SHIPPED | OUTPATIENT
Start: 2021-08-04 | End: 2021-09-17

## 2021-08-04 NOTE — TELEPHONE ENCOUNTER
Pt calling, states since her LEEP this spring (4/29) she has had very painful intercourse.  States with penetration when her cervix is touched she has significant pain.    Wondering if there is anything she can do prior to intercourse like a lidocaine or anything to help with this?    Should she be seen for exam and eval?    Kourtney Velazquez RN

## 2021-08-04 NOTE — TELEPHONE ENCOUNTER
Copied from providers routing comment:    Her symptoms are not typical.  I would advise an appointment and we can discuss management options at that time.     Dr. Mcwilliams

## 2021-08-04 NOTE — TELEPHONE ENCOUNTER
Last seen: 8/3/21  RTC: November   Cancel: none   No-show: none  Next appt: 11/2/21     Incoming refill from pharmacy via fax     Medication requested: OLANZapine (ZYPREXA) 5 MG tablet  Directions: Sig: Take 1 tab each AM and 1 tab at bedtime.  Qty: 180 tablet   Last refilled: 6/3/21     Medication refill pended and routed to provider for approval (90 d/s).

## 2021-08-06 ENCOUNTER — OFFICE VISIT (OUTPATIENT)
Dept: UROLOGY | Facility: CLINIC | Age: 51
End: 2021-08-06
Payer: COMMERCIAL

## 2021-08-06 VITALS
WEIGHT: 122 LBS | HEIGHT: 66 IN | DIASTOLIC BLOOD PRESSURE: 78 MMHG | BODY MASS INDEX: 19.61 KG/M2 | SYSTOLIC BLOOD PRESSURE: 116 MMHG

## 2021-08-06 DIAGNOSIS — N39.0 RECURRENT UTI: Primary | ICD-10-CM

## 2021-08-06 DIAGNOSIS — N31.2 ATONIC BLADDER: ICD-10-CM

## 2021-08-06 LAB
ALBUMIN UR-MCNC: NEGATIVE MG/DL
ALBUMIN UR-MCNC: NEGATIVE MG/DL
APPEARANCE UR: CLEAR
APPEARANCE UR: CLEAR
BILIRUB UR QL STRIP: NEGATIVE
BILIRUB UR QL STRIP: NEGATIVE
COLOR UR AUTO: YELLOW
COLOR UR AUTO: YELLOW
GLUCOSE UR STRIP-MCNC: NEGATIVE MG/DL
GLUCOSE UR STRIP-MCNC: NEGATIVE MG/DL
HGB UR QL STRIP: ABNORMAL
HGB UR QL STRIP: NEGATIVE
KETONES UR STRIP-MCNC: NEGATIVE MG/DL
KETONES UR STRIP-MCNC: NEGATIVE MG/DL
LEUKOCYTE ESTERASE UR QL STRIP: NEGATIVE
LEUKOCYTE ESTERASE UR QL STRIP: NEGATIVE
NITRATE UR QL: NEGATIVE
NITRATE UR QL: NEGATIVE
PH UR STRIP: 6 [PH] (ref 5–8)
PH UR STRIP: 6.5 [PH] (ref 5–7)
SP GR UR STRIP: <1.005 (ref 1–1.03)
SP GR UR STRIP: <=1.005 (ref 1–1.03)
UROBILINOGEN UR STRIP-ACNC: 0.2 E.U./DL
UROBILINOGEN UR STRIP-ACNC: 0.2 E.U./DL

## 2021-08-06 PROCEDURE — 99213 OFFICE O/P EST LOW 20 MIN: CPT | Mod: 25 | Performed by: STUDENT IN AN ORGANIZED HEALTH CARE EDUCATION/TRAINING PROGRAM

## 2021-08-06 PROCEDURE — 81003 URINALYSIS AUTO W/O SCOPE: CPT | Mod: QW | Performed by: STUDENT IN AN ORGANIZED HEALTH CARE EDUCATION/TRAINING PROGRAM

## 2021-08-06 PROCEDURE — 52000 CYSTOURETHROSCOPY: CPT | Performed by: STUDENT IN AN ORGANIZED HEALTH CARE EDUCATION/TRAINING PROGRAM

## 2021-08-06 RX ORDER — LIDOCAINE HYDROCHLORIDE 20 MG/ML
JELLY TOPICAL ONCE
Status: COMPLETED | OUTPATIENT
Start: 2021-08-06 | End: 2021-08-06

## 2021-08-06 RX ADMIN — LIDOCAINE HYDROCHLORIDE: 20 JELLY TOPICAL at 14:30

## 2021-08-06 ASSESSMENT — PAIN SCALES - GENERAL: PAINLEVEL: NO PAIN (0)

## 2021-08-06 ASSESSMENT — MIFFLIN-ST. JEOR: SCORE: 1190.14

## 2021-08-06 NOTE — LETTER
"8/6/2021       RE: Ana Laura Wharton  [address]  [place] [address]     Dear Colleague,    Thank you for referring your patient, Ana Laura Wharton, to the [facility]OLOG[facility] [place] at [facility]. Please see a copy of my visit note below.    CHIEF COMPLAINT   Ana Laura Wharton who is a 50 year old female returns today for follow-up of recurrent UTI.      HPI   Ana Laura Wharton is a 50 year old female who presents with a history of recurrent UTI.  Tried methenamine and did not tolerate     Found to have large bladder capacity and urinary retention. Catheterizing 4 times a day now and voiding in between. Having large volumes on cathing. Continues on nitrofurantoin ppx without breakthrough infection.    Urodynamics 8/2/2021    -Large bladder capacity (1239 mL) with diminished filling sensations.  -Good bladder compliance without DO/DOI.  -No LEANDRA.  -Very weak detrusor contraction during voiding to max Pdet 8.5 cm H2O.  -Brisk flow rate (Qmax 39 mL/s) with a bell-shaped flow curve and incomplete bladder emptying (final  mL).  -Increased EMG activity during voiding.  -BOOI is -71.1 which is negative for bladder outlet obstruction.  -Fluoroscopy reveals  a smooth bladder wall without diverticulae or cellules.  No vesicoureteral reflux was observed.  The bladder neck was closed during filling and open during voiding.     PHYSICAL EXAM  Patient is a 50 year old  female   Vitals: Blood pressure 116/78, height 1.676 m (5' 6\"), weight 55.3 kg (122 lb), not currently breastfeeding.  Body mass index is 19.69 kg/m .  General Appearance Adult:   Alert, no acute distress, oriented  HENT: throat/mouth:normal, good dentition  Lungs: no respiratory distress, or pursed lip breathing  Heart: No obvious jugular venous distension present  Abdomen: soft, nontender, no organomegaly or masses  Musculoskeltal: extremities normal, no peripheral edema  Skin: " no suspicious lesions or rashes  Neuro: Alert, oriented, speech and mentation normal  Psych: affect and mood normal      PRE-PROCEDURE DIAGNOSIS: recurrent UTI    POST-PROCEDURE DIAGNOSIS: recurrent UTI    PROCEDURE: Cystoscopy     DESCRIPTION OF PROCEDURE: After informed consent was obtained, the patient was brought to the procedure room where she was placed in the lithotomy position with all pressure points well padded.  The vulva was prepped and draped in sterile fashion. A flexible cystoscope was introduced through a well-lubricated urethra.    Large capacity bladder with prominent posterior impression from uterus. No significant trabeculation or cellules or diverticulae. No concerning urothelial lesions or bladder stones    The flexible cystoscope was removed and the findings were described to the patient.     ASSESSMENT and PLAN  51 yo F with recurrent UTI due to atonic bladder with incomplete bladder emptying. Doing well on ISC and nitrofurantoin ppx. Discussed that she needs to continue to empty her bladder with either intermittent self cath or indwelling [place]. She opts to continue ISC    - stop nitrofurantoin after current prescription runs out  - continue ISC 4x a day indefinitely  - referral to Dr. Morrison for consideration of sacral neuromodulation for nonobstructive urinary retention    Lewis Feldman MD   [facility] Urology  [facility] Phone: [phone]

## 2021-08-06 NOTE — PATIENT INSTRUCTIONS

## 2021-08-06 NOTE — NURSING NOTE
Chief Complaint   Patient presents with     recurrent UTI     Cystoscopy     Prior to the start of the procedure and with procedural staff participation, I verbally confirmed the patient s identity using two indicators, relevant allergies, that the procedure was appropriate and matched the consent or emergent situation, and that the correct equipment/implants were available. Immediately prior to starting the procedure I conducted the Time Out with the procedural staff and re-confirmed the patient s name, procedure, and site/side. I have wiped the patient off with the povidone-Iodine solution, draped them, and used Lidocaine hydrochloride jelly. (The Joint Commission universal protocol was followed.)  Yes    Sedation (Moderate or Deep): None    5mL 2% lidocaine hydrochloride Urojet instilled into urethra.    NDC# [address]-8  Lot #: [id number]  Expiration Date:  1/23    Zuleyka Lopez EMT

## 2021-08-06 NOTE — PROGRESS NOTES
"CHIEF COMPLAINT   Ana Laura Wharton who is a 50 year old female returns today for follow-up of recurrent UTI.      HPI   Ana Laura Wharton is a 50 year old female who presents with a history of recurrent UTI.  Tried methenamine and did not tolerate     Found to have large bladder capacity and urinary retention. Catheterizing 4 times a day now and voiding in between. Having large volumes on cathing. Continues on nitrofurantoin ppx without breakthrough infection.    Urodynamics 8/2/2021    -Large bladder capacity (1239 mL) with diminished filling sensations.  -Good bladder compliance without DO/DOI.  -No LEANDRA.  -Very weak detrusor contraction during voiding to max Pdet 8.5 cm H2O.  -Brisk flow rate (Qmax 39 mL/s) with a bell-shaped flow curve and incomplete bladder emptying (final  mL).  -Increased EMG activity during voiding.  -BOOI is -71.1 which is negative for bladder outlet obstruction.  -Fluoroscopy reveals  a smooth bladder wall without diverticulae or cellules.  No vesicoureteral reflux was observed.  The bladder neck was closed during filling and open during voiding.     PHYSICAL EXAM  Patient is a 50 year old  female   Vitals: Blood pressure 116/78, height 1.676 m (5' 6\"), weight 55.3 kg (122 lb), not currently breastfeeding.  Body mass index is 19.69 kg/m .  General Appearance Adult:   Alert, no acute distress, oriented  HENT: throat/mouth:normal, good dentition  Lungs: no respiratory distress, or pursed lip breathing  Heart: No obvious jugular venous distension present  Abdomen: soft, nontender, no organomegaly or masses  Musculoskeltal: extremities normal, no peripheral edema  Skin: no suspicious lesions or rashes  Neuro: Alert, oriented, speech and mentation normal  Psych: affect and mood normal      PRE-PROCEDURE DIAGNOSIS: recurrent UTI    POST-PROCEDURE DIAGNOSIS: recurrent UTI    PROCEDURE: Cystoscopy     DESCRIPTION OF PROCEDURE: After informed consent was obtained, the patient was brought to the " procedure room where she was placed in the lithotomy position with all pressure points well padded.  The vulva was prepped and draped in sterile fashion. A flexible cystoscope was introduced through a well-lubricated urethra.    Large capacity bladder with prominent posterior impression from uterus. No significant trabeculation or cellules or diverticulae. No concerning urothelial lesions or bladder stones    The flexible cystoscope was removed and the findings were described to the patient.     ASSESSMENT and PLAN  49 yo F with recurrent UTI due to atonic bladder with incomplete bladder emptying. Doing well on ISC and nitrofurantoin ppx. Discussed that she needs to continue to empty her bladder with either intermittent self cath or indwelling Napoles. She opts to continue ISC    - stop nitrofurantoin after current prescription runs out  - continue ISC 4x a day indefinitely  - referral to Dr. Morrison for consideration of sacral neuromodulation for nonobstructive urinary retention    Lewis Feldman MD   [facility]  [facility] Phone: [phone]

## 2021-08-13 ENCOUNTER — OFFICE VISIT (OUTPATIENT)
Dept: OBGYN | Facility: CLINIC | Age: 51
End: 2021-08-13
Payer: COMMERCIAL

## 2021-08-13 VITALS — WEIGHT: 119.2 LBS | DIASTOLIC BLOOD PRESSURE: 68 MMHG | BODY MASS INDEX: 19.24 KG/M2 | SYSTOLIC BLOOD PRESSURE: 114 MMHG

## 2021-08-13 DIAGNOSIS — N94.12 DEEP DYSPAREUNIA: Primary | ICD-10-CM

## 2021-08-13 DIAGNOSIS — Z23 NEED FOR VACCINATION: ICD-10-CM

## 2021-08-13 PROCEDURE — 99213 OFFICE O/P EST LOW 20 MIN: CPT | Mod: 25 | Performed by: OBSTETRICS & GYNECOLOGY

## 2021-08-13 PROCEDURE — 90750 HZV VACC RECOMBINANT IM: CPT | Performed by: OBSTETRICS & GYNECOLOGY

## 2021-08-13 PROCEDURE — 90471 IMMUNIZATION ADMIN: CPT | Performed by: OBSTETRICS & GYNECOLOGY

## 2021-08-13 NOTE — NURSING NOTE
Prior to immunization administration, verified patients identity using patient s name and date of birth. Please see Immunization Activity for additional information.     Screening Questionnaire for Adult Immunization    Are you sick today?   No   Do you have allergies to medications, food, a vaccine component or latex?   Yes   Have you ever had a serious reaction after receiving a vaccination?   No   Do you have a long-term health problem with heart, lung, kidney, or metabolic disease (e.g., diabetes), asthma, a blood disorder, no spleen, complement component deficiency, a cochlear implant, or a spinal fluid leak?  Are you on long-term aspirin therapy?   No   Do you have cancer, leukemia, HIV/AIDS, or any other immune system problem?   No   Do you have a parent, brother, or sister with an immune system problem?   No   In the past 3 months, have you taken medications that affect  your immune system, such as prednisone, other steroids, or anticancer drugs; drugs for the treatment of rheumatoid arthritis, Crohn s disease, or psoriasis; or have you had radiation treatments?   No   Have you had a seizure, or a brain or other nervous system problem?   No   During the past year, have you received a transfusion of blood or blood    products, or been given immune (gamma) globulin or antiviral drug?   No   For women: Are you pregnant or is there a chance you could become       pregnant during the next month?   No   Have you received any vaccinations in the past 4 weeks?   No     Immunization questionnaire answers were all negative.        Health maintenance, injection of Shingrix  given by Noris Hodges CMA. Patient instructed to remain in clinic for 15 minutes afterwards, and to report any adverse reaction to me immediately.       Screening performed by Noris Hodges CMA on 8/13/2021 at 11:55 AM.

## 2021-08-13 NOTE — PROGRESS NOTES
"  SUBJECTIVE:                                                   Ana Laura Wharton is a 50 year old female who presents to clinic today for the following health issue(s):  Patient presents with:  Pelvic Pain      HPI:  Patient had LEEP on 21 for persistent MICHELLE 1.  Final pathology w/o residual dysplasia.  ECC on  had shown MICHELLE 1 with +HPV other.    Patient states she has hd I/C x 2 since LEEP with a \"tender cervix\" both times.  No bleeding.  No pain/bleeding or discharge at other times.    No LMP recorded. Patient is perimenopausal..   Patient is sexually active, .  Using oral contraceptives for contraception.     Problem list and histories reviewed & adjusted, as indicated.  Additional history: as documented.    Patient Active Problem List   Diagnosis     Depressed bipolar I disorder in full remission (H)     Anxiety disorder     Suicide ideation     Bipolar disorder current episode depressed (H)     Overdose     Depression     Mood disorder (H)     Suicidal ideations     Bipolar I disorder (H)     Acute renal failure (H)     Alternating exotropia     Anxiety state     Other bipolar disorders     Personality disorder (H)     Tear film insufficiency     Dysfunctions associated with sleep stages or arousal from sleep     Abnormal finding of blood chemistry     Esophageal reflux     Other specified hypothyroidism     Impulse control disorder     Acidosis     Low back pain     Myopia     Opioid dependence (H)     Orofacial dyskinesia     Other migraine without status migrainosus, not intractable     Pure hypercholesterolemia     Poisoning by 4-aminophenol derivatives, undetermined intent     Essential hypertension     Left knee pain     Aftercare following surgery of the musculoskeletal system     UTI (urinary tract infection)     Acute cystitis     Clostridium difficile colitis     Drug overdose     Calcium channel blocker overdose     Intentional acetaminophen overdose (H)     Alcoholic intoxication with " complication (H)     Hypotension due to medication     Acute renal failure, unspecified acute renal failure type (H)     Paroxysmal atrial fibrillation (H)     Acute respiratory failure with hypoxia (H)     Acute pulmonary edema (H)     Spontaneous pneumothorax     PRES (posterior reversible encephalopathy syndrome)     Pancreatitis     Atypical squamous cells cannot exclude high grade squamous intraepithelial lesion on cytologic smear of cervix (ASC-H)     Alcohol withdrawal syndrome without complication (H)     Past Surgical History:   Procedure Laterality Date     ARTHROSCOPY KNEE      L knee     BLADDER SURGERY       CERVIX SURGERY      for pre-cancerous changes     COLONOSCOPY Left 3/3/2021    Procedure: COLONOSCOPY, WITH POLYPECTOMY USING COLD SNARE;  Surgeon: Duane Connell MD;  Location: [facility]     left knee surgery       ORTHOPEDIC SURGERY      L shoulder     THORACIC SURGERY      for pneumothorax, pleurodesis and lobe resection      Social History     Tobacco Use     Smoking status: Current Every Day Smoker     Packs/day: 1.00     Types: Cigarettes     Smokeless tobacco: Never Used     Tobacco comment: pt states she is quiting and declined resources   Substance Use Topics     Alcohol use: Not Currently     Alcohol/week: 2.0 standard drinks     Types: 2 Glasses of wine per week     Comment: None      Problem (# of Occurrences) Relation (Name,Age of Onset)    Depression (1) Mother    Lipids (1) Father: hyperlipidemia    Macular Degeneration (1) Father    No Known Problems (2) Brother (1), Son (1)    Osteoporosis (1) Mother    Pacemaker (1) Mother       Negative family history of: Colon Cancer            acetaminophen (TYLENOL) 500 MG tablet, Take 1-2 tablets (500-1,000 mg) by mouth every 6 hours as needed for mild pain  albuterol (PROAIR HFA/PROVENTIL HFA/VENTOLIN HFA) 108 (90 Base) MCG/ACT inhaler, Inhale 2 puffs into the lungs every 6 hours as needed for shortness of breath / dyspnea or  wheezing  atorvastatin (LIPITOR) 10 MG tablet, TAKE 1 TABLET EVERY DAY  carvedilol (COREG) 12.5 MG tablet, TAKE 1 TABLET TWICE DAILY WITH MEALS  clindamycin (CLEOCIN T) 1 % external lotion, Apply topically daily  clobetasol (TEMOVATE) 0.05 % external cream, Apply topically 2 times daily  clonazePAM (KLONOPIN) 1 MG tablet, Take 1 tablet (1 mg) by mouth 2 times daily AND 2 tablets (2 mg) At Bedtime.  cyclobenzaprine (FLEXERIL) 10 MG tablet, Take 1 tablet (10 mg) by mouth At Bedtime  Emollient (HYDROPHOR EX), Externally apply topically At Bedtime  Emollient (HYDROPHOR) OINT, Externally apply topically daily  folic acid (FOLVITE) 400 MCG tablet, Take 400 mcg by mouth daily  hydrOXYzine (VISTARIL) 50 MG capsule, Take 1 capsule (50 mg) by mouth 2 times daily. May also take 1 capsule (50 mg) daily as needed for anxiety.  ketorolac (TORADOL) 30 MG/ML injection, every 7 days  levothyroxine (SYNTHROID/LEVOTHROID) 50 MCG tablet, TAKE 1 TABLET EVERY DAY  mirtazapine (REMERON) 30 MG tablet, Take 2 tablets (60 mg) by mouth At Bedtime  Multiple Vitamins-Minerals (WOMENS MULTI VITAMIN & MINERAL PO), Take 1 tablet by mouth daily  nitroFURantoin macrocrystal-monohydrate (MACROBID) 100 MG capsule, Take 1 capsule (100 mg) by mouth 2 times daily Take one pill bid x 7days   Then decrease to one daily  until follow up appointment  norethindrone (MICRONOR) 0.35 MG tablet, Take 1 tablet (0.35 mg) by mouth daily continuously  OLANZapine (ZYPREXA) 10 MG tablet, Take 1 tablet (10 mg) by mouth At Bedtime (Patient taking differently: Take 20 mg by mouth At Bedtime )  OLANZapine (ZYPREXA) 5 MG tablet, Take 1 tab each AM and 1 tab at bedtime.  ondansetron (ZOFRAN) 4 MG tablet, Take 1 tablet (4 mg) by mouth every 8 hours as needed for nausea  pantoprazole (PROTONIX) 40 MG EC tablet, TAKE 1 TABLET BY MOUTH  TWICE DAILY  polyethylene glycol (MIRALAX) 17 g packet, Take 1 packet by mouth daily as needed for constipation  Sennosides-Docusate Sodium  "(SENNA S PO),   sodium chloride (OCEAN) 0.65 % nasal spray, Use in both nostrils 3 times a day.  sulfamethoxazole-trimethoprim (BACTRIM DS) 800-160 MG tablet, Take 1 tablet by mouth 2 times daily  Syringe/Needle, Disp, (SYRINGE LUER LOCK) 25G X 1\" 3 ML MISC, 1 Act once a week  tadalafil (CIALIS) 20 MG tablet, Take 1 tablet (20 mg) by mouth daily as needed (sexual dysfunction)    No current facility-administered medications on file prior to visit.    Allergies   Allergen Reactions     Ciprofloxacin Other (See Comments)     Joint pain cdiff     Compazine [Prochlorperazine] Other (See Comments)     dystonia     Metoclopramide Other (See Comments)     \"I feel like I am crawling out of my skin\"     Reglan [Metoclopramide Hcl] Other (See Comments)     Sensation of \"crawling out of skin\"     Restoril [Temazepam]      Thorazine [Chlorpromazine] Other (See Comments)     dystonia     Abilify [Aripiprazole] Rash     Lamotrigine Rash     Severe drug rash - contraindication to receiving again. Skin peeling.        ROS:  5 point ROS negative except as noted above in HPI, including Gen., Resp., CV, GI &  system review.    OBJECTIVE:     /68   Wt 54.1 kg (119 lb 3.2 oz)   BMI 19.24 kg/m     BMI: Body mass index is 19.24 kg/m .  General: Alert and oriented, no distress.  Psychiatric: Mood and affect within normal limits.    Vulva:  No external lesions, normal female hair distribution, no inguinal adenopathy.    Urethra:  Midline, non-tender, well supported, no discharge  Vagina:  Well-estrogenized, no abnormal discharge, no lesions  Cervix: nulliparous and small area of endocervical tissue vs granulation at Os.  Treatesd with AgNO3.  \"that's tender!\"      ASSESSMENT/PLAN:                                                    1. Dyspareunia post LEEP    Possible area ofg granulation tissue at Os, treated with AgNO3    2.  Vaccination - Shingrex # 1 given      Follow up prn    Celso Mcwilliams MD  [facility] " [facility]

## 2021-08-17 ENCOUNTER — TRANSFERRED RECORDS (OUTPATIENT)
Dept: HEALTH INFORMATION MANAGEMENT | Facility: CLINIC | Age: 51
End: 2021-08-17

## 2021-08-17 NOTE — PROGRESS NOTES
Service Date: 07/02/2021    Subjective: Mixed episode. Extra Zyprexa helped. Slept ok. 4 months sober. Tired from extra Zyprexa. Increased stress-overwhelmed. Attends group 2 x/wk. Son's graduation coming up.      Meds:    Current Outpatient Medications:      acetaminophen (TYLENOL) 500 MG tablet, Take 1-2 tablets (500-1,000 mg) by mouth every 6 hours as needed for mild pain, Disp:  , Rfl:      albuterol (PROAIR HFA/PROVENTIL HFA/VENTOLIN HFA) 108 (90 Base) MCG/ACT inhaler, Inhale 2 puffs into the lungs every 6 hours as needed for shortness of breath / dyspnea or wheezing, Disp: 1 Inhaler, Rfl: 0     atorvastatin (LIPITOR) 10 MG tablet, TAKE 1 TABLET EVERY DAY, Disp: 90 tablet, Rfl: 3     carvedilol (COREG) 12.5 MG tablet, TAKE 1 TABLET TWICE DAILY WITH MEALS, Disp: 180 tablet, Rfl: 3     clindamycin (CLEOCIN T) 1 % external lotion, Apply topically daily, Disp: 60 mL, Rfl: 3     clobetasol (TEMOVATE) 0.05 % external cream, Apply topically 2 times daily, Disp: 60 g, Rfl: 1     clonazePAM (KLONOPIN) 1 MG tablet, Take 1 tablet (1 mg) by mouth 2 times daily AND 2 tablets (2 mg) At Bedtime., Disp: 360 tablet, Rfl: 1     cyclobenzaprine (FLEXERIL) 10 MG tablet, Take 1 tablet (10 mg) by mouth At Bedtime, Disp: 90 tablet, Rfl: 1     Emollient (HYDROPHOR EX), Externally apply topically At Bedtime, Disp: , Rfl:      Emollient (HYDROPHOR) OINT, Externally apply topically daily, Disp: 454 g, Rfl: 3     folic acid (FOLVITE) 400 MCG tablet, Take 400 mcg by mouth daily, Disp: , Rfl:      hydrOXYzine (VISTARIL) 50 MG capsule, Take 1 capsule (50 mg) by mouth 2 times daily. May also take 1 capsule (50 mg) daily as needed for anxiety., Disp: 270 capsule, Rfl: 1     ketorolac (TORADOL) 30 MG/ML injection, every 7 days, Disp: , Rfl:      levothyroxine (SYNTHROID/LEVOTHROID) 50 MCG tablet, TAKE 1 TABLET EVERY DAY, Disp: 90 tablet, Rfl: 3     mirtazapine (REMERON) 30 MG tablet, Take 2 tablets (60 mg) by mouth At Bedtime, Disp: 180 tablet,  "Rfl: 1     Multiple Vitamins-Minerals (WOMENS MULTI VITAMIN & MINERAL PO), Take 1 tablet by mouth daily, Disp: , Rfl:      nitroFURantoin macrocrystal-monohydrate (MACROBID) 100 MG capsule, Take 1 capsule (100 mg) by mouth 2 times daily Take one pill bid x 7days   Then decrease to one daily  until follow up appointment, Disp: 90 capsule, Rfl: 0     norethindrone (MICRONOR) 0.35 MG tablet, Take 1 tablet (0.35 mg) by mouth daily continuously, Disp: 90 tablet, Rfl: 4     OLANZapine (ZYPREXA) 10 MG tablet, Take 1 tablet (10 mg) by mouth At Bedtime (Patient taking differently: Take 20 mg by mouth At Bedtime ), Disp: 90 tablet, Rfl: 0     OLANZapine (ZYPREXA) 5 MG tablet, Take 1 tab each AM and 1 tab at bedtime., Disp: 180 tablet, Rfl: 0     ondansetron (ZOFRAN) 4 MG tablet, Take 1 tablet (4 mg) by mouth every 8 hours as needed for nausea, Disp: 30 tablet, Rfl: 0     pantoprazole (PROTONIX) 40 MG EC tablet, TAKE 1 TABLET BY MOUTH  TWICE DAILY, Disp: 180 tablet, Rfl: 1     polyethylene glycol (MIRALAX) 17 g packet, Take 1 packet by mouth daily as needed for constipation, Disp: , Rfl:      Sennosides-Docusate Sodium (SENNA S PO), , Disp: , Rfl:      sodium chloride (OCEAN) 0.65 % nasal spray, Use in both nostrils 3 times a day., Disp: 30 mL, Rfl: 1     sulfamethoxazole-trimethoprim (BACTRIM DS) 800-160 MG tablet, Take 1 tablet by mouth 2 times daily, Disp: 14 tablet, Rfl: 0     Syringe/Needle, Disp, (SYRINGE LUER LOCK) 25G X 1\" 3 ML MISC, 1 Act once a week, Disp: 10 each, Rfl: 0     tadalafil (CIALIS) 20 MG tablet, Take 1 tablet (20 mg) by mouth daily as needed (sexual dysfunction), Disp: 90 tablet, Rfl: 0    Psychiatry Review of Symptoms: Panic attacks last 2 days. No psychosis.          Medical ROS: No more GI problems. Frequent UTI's.        Diagnosis:  Bipolar 1, most recent episode mixed.        Plan: Zyprexa 5/20 and 5 prn.        Jaden R. Rittberg, MD        D: 08/17/2021   T: 08/17/2021   MT: AMEE    Name:     RIVKA, " KEE BRAUN  MRN:      [id number]        Account:      [id number]   :      1970           Service Date: 2021       Document: [id number]

## 2021-08-17 NOTE — PROGRESS NOTES
Service Date: 2021    Subjective: Doing well. Gets out.to [place] group MI and CD support.. Mood even. Sleeps 10-12 hrs. Eating ok. 125 pounds. Not anhedonic. Concentration pretty good. No recent writing. Doesn't see son.  No real side effects.  Quite anxious when delays clonazepam.  We did discuss eventual need to taper.  Now is not a good time.  No recent alcohol.      Meds:  See EMR    Medical ROS: Constipation/diarrhea.        Diagnosis:Bipolar I, currently euthymic        Plan:  No changes today        Jaden Rodriguez MD        D: 2021   T: 2021   MT: AMEE    Name:     KEE TINEO  MRN:      [id number]        Account:      [id number]   :      1970           Service Date: 2021       Document: [id number]

## 2021-08-17 NOTE — PROGRESS NOTES
Service Date: 2020    Subjective: Living by self. Talks to friends, coloring books, writes. Conc poor. Memory loss. Decrease Klonopin. Sleeping. Poor appetite. Losing wt. Doesn't go out much. Poor motivation for ADL's. Agoraphobia. Sad and lonely.        Suicide Thoughts: Strong SI each AM. Scared to drive. Finances.        Psychiatry Review of Symptoms: Frequent bladder infection. C diff treated but colitis remains.        Medications: No changes.        Diagnosis:  Bipolar 1; alcohol dependence in remission.        Plan:  No changes        Jaden Rodriguez MD        D: 2021   T: 2021   MT: AMEE    Name:     KEE TINEO  MRN:      [id number]        Account:      [id number]   :      1970           Service Date: 2020       Document: [id number]

## 2021-08-17 NOTE — PROGRESS NOTES
Service Date: 06/10/2021    Subjective: Son graduated from high school. Happy occasion. Ex got engaged. Family didn't invite her to gathering. Vistaril helpful. No linad. Thinks current meds work well. Disabled.  No SI.  No alcohol        Psychiatry Review of Symptoms: Anxiety.        Medical ROS: Urologist bladder infections 2 x/month. Cancer cells from cervix.    Meds:    Current Outpatient Medications:      hydrOXYzine (VISTARIL) 50 MG capsule, Take 1 capsule (50 mg) by mouth 2 times daily. May also take 1 capsule (50 mg) daily as needed for anxiety., Disp: 270 capsule, Rfl: 1     mirtazapine (REMERON) 30 MG tablet, Take 2 tablets (60 mg) by mouth At Bedtime, Disp: 180 tablet, Rfl: 1     acetaminophen (TYLENOL) 500 MG tablet, Take 1-2 tablets (500-1,000 mg) by mouth every 6 hours as needed for mild pain, Disp:  , Rfl:      albuterol (PROAIR HFA/PROVENTIL HFA/VENTOLIN HFA) 108 (90 Base) MCG/ACT inhaler, Inhale 2 puffs into the lungs every 6 hours as needed for shortness of breath / dyspnea or wheezing, Disp: 1 Inhaler, Rfl: 0     atorvastatin (LIPITOR) 10 MG tablet, TAKE 1 TABLET EVERY DAY, Disp: 90 tablet, Rfl: 3     carvedilol (COREG) 12.5 MG tablet, TAKE 1 TABLET TWICE DAILY WITH MEALS, Disp: 180 tablet, Rfl: 3     clindamycin (CLEOCIN T) 1 % external lotion, Apply topically daily, Disp: 60 mL, Rfl: 3     clobetasol (TEMOVATE) 0.05 % external cream, Apply topically 2 times daily, Disp: 60 g, Rfl: 1     clonazePAM (KLONOPIN) 1 MG tablet, Take 1 tablet (1 mg) by mouth 2 times daily AND 2 tablets (2 mg) At Bedtime., Disp: 360 tablet, Rfl: 1     cyclobenzaprine (FLEXERIL) 10 MG tablet, Take 1 tablet (10 mg) by mouth At Bedtime, Disp: 90 tablet, Rfl: 1     Emollient (HYDROPHOR EX), Externally apply topically At Bedtime, Disp: , Rfl:      Emollient (HYDROPHOR) OINT, Externally apply topically daily, Disp: 454 g, Rfl: 3     folic acid (FOLVITE) 400 MCG tablet, Take 400 mcg by mouth daily, Disp: , Rfl:      ketorolac  "(TORADOL) 30 MG/ML injection, every 7 days, Disp: , Rfl:      levothyroxine (SYNTHROID/LEVOTHROID) 50 MCG tablet, TAKE 1 TABLET EVERY DAY, Disp: 90 tablet, Rfl: 3     Multiple Vitamins-Minerals (WOMENS MULTI VITAMIN & MINERAL PO), Take 1 tablet by mouth daily, Disp: , Rfl:      nitroFURantoin macrocrystal-monohydrate (MACROBID) 100 MG capsule, Take 1 capsule (100 mg) by mouth 2 times daily Take one pill bid x 7days   Then decrease to one daily  until follow up appointment, Disp: 90 capsule, Rfl: 0     norethindrone (MICRONOR) 0.35 MG tablet, Take 1 tablet (0.35 mg) by mouth daily continuously, Disp: 90 tablet, Rfl: 4     OLANZapine (ZYPREXA) 10 MG tablet, Take 1 tablet (10 mg) by mouth At Bedtime (Patient taking differently: Take 20 mg by mouth At Bedtime ), Disp: 90 tablet, Rfl: 0     OLANZapine (ZYPREXA) 5 MG tablet, Take 1 tab each AM and 1 tab at bedtime., Disp: 180 tablet, Rfl: 0     ondansetron (ZOFRAN) 4 MG tablet, Take 1 tablet (4 mg) by mouth every 8 hours as needed for nausea, Disp: 30 tablet, Rfl: 0     pantoprazole (PROTONIX) 40 MG EC tablet, TAKE 1 TABLET BY MOUTH  TWICE DAILY, Disp: 180 tablet, Rfl: 1     polyethylene glycol (MIRALAX) 17 g packet, Take 1 packet by mouth daily as needed for constipation, Disp: , Rfl:      Sennosides-Docusate Sodium (SENNA S PO), , Disp: , Rfl:      sodium chloride (OCEAN) 0.65 % nasal spray, Use in both nostrils 3 times a day., Disp: 30 mL, Rfl: 1     sulfamethoxazole-trimethoprim (BACTRIM DS) 800-160 MG tablet, Take 1 tablet by mouth 2 times daily, Disp: 14 tablet, Rfl: 0     Syringe/Needle, Disp, (SYRINGE LUER LOCK) 25G X 1\" 3 ML MISC, 1 Act once a week, Disp: 10 each, Rfl: 0     tadalafil (CIALIS) 20 MG tablet, Take 1 tablet (20 mg) by mouth daily as needed (sexual dysfunction), Disp: 90 tablet, Rfl: 0    Diagnosis:  Bipolar 1, most recent depressed; severe anxiety.        Plan:  Will be referring to Sly Andrews MD once I retire.        Jaden Rodriguez MD        D: " 2021   T: 2021   MT: AMEE    Name:     KEE TINEO  MRN:      [id number]        Account:      [id number]   :      1970           Service Date: 06/10/2021       Document: [id number]

## 2021-08-17 NOTE — PROGRESS NOTES
Service Date: 10/22/2020    Subjective: Down. Doesn't want to be here anymore. SI past 5 days. Has to get together with ex . Sleep ok. Poor appetite. Sometimes orthostatic but on Coreg for HTN. Never forgets meds. Using alcohol daily. Sad. Everything is a chore.        Psychiatry Review of Symptoms: Panic attacks. No Alyssa or psychosis.        Medications: Dry mouth.          Medical ROS: No physical problems except no desire to eat.        Diagnosis:  Bipolar 1; alcohol use disorder        Plan:  Discussed impact alcohol has on mood stability.  Encouraged to stop         Jaden Rodriguez MD        D: 2021   T: 2021   MT: AMEE    Name:     KEE TINEO  MRN:      [id number]        Account:      [id number]   :      1970           Service Date: 10/22/2020       Document: [id number]
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
## 2021-08-17 NOTE — PROGRESS NOTES
"Service Date: 2021    Subjective: \"Winter blues.\" No SI. Low energy, sad. Early AM awakening. Tired. Overrating. 130 pounds. . Got together with friends. Has support. No alcohol x 3 months. Tried. Sad light didn't help. Support group.          Medical ROS: No current GI symptoms except constipation.        Diagnosis:  Bipolar 1; history alcohol use disorder.        Plan:  No antidepressant except Remeron.  All others caused rapid cycling        Jaden Rodriguez MD        D: 2021   T: 2021   MT: AMEE    Name:     KEE TINEO  MRN:      [id number]        Account:      [id number]   :      1970           Service Date: 2021       Document: [id number]    "

## 2021-08-18 NOTE — PROGRESS NOTES
Medication Therapy Management (MTM) Encounter    ASSESSMENT:                            Medication Adherence/Access: No issues identified    Bipolar 1 Disorder: Pt's sexual side effects of low libido and anorgasmia could certainly be due to olanzapine, especially given that the onset of these symptoms seems to correlate with her starting olanzapine in 2017. Typical strategies for treating antipsychotic-induced sexual side effects include: lowering the dose, trying an alternate agent with less risk of sexual side effects (ie Abilify, Geodon, and possibly Latuda or Seroquel). Other options such as adding PD5-inhibitor (Cialis or Viagra) or using adjunctive agents such as Buspar or Wellbutrin are better studied in selective serotonin reuptake inhibitor-induced sexual dysfunction. Nonetheless, we reviewed all these options with patient and certainly there are potential risk/benefits with these options. Given her current symptoms and concern for a mixed episode, a dose reduction of olanzapine may contribute to further symptom exacerbation. Adding Wellbutrin may help treat depression symptoms but increase risk of linda, and adding Buspar may help target anxiety symptoms, but would not benefit depression or linda symptoms. In regards to alternate antipsychotics, pt is not interested in trying Seroquel, there would be concern for recurrent rash with Abilify, and Geodon/Latuda may not be effective enough agents for pt's symptoms. Would appreciate input from psychiatrist team regarding next steps and current symptom evaluation.       PLAN:                            - Pt scheduled to see Dr. Benavides 8/24/21, please see assessment above for considerations regarding antipsychotic-induced sexual side effects.   - MTM follow-up as needed, pending medication changes.       SUBJECTIVE/OBJECTIVE:                          Ana Laura Wharton is a 50 year old female called for an initial visit. She was referred to me from Dr. Jaden Rodriguez,  MD.      Reason for visit: MTM, discuss sexual side effect concerns.    Allergies/ADRs: Reviewed in chart - Lamictal caused rash, Abilify caused rash  Past Medical History: Reviewed in chart  Tobacco: She reports that she has been smoking cigarettes. She has been smoking about 1.00 pack per day. She has never used smokeless tobacco.Tobacco Cessation Action Plan:   did not discuss today  Alcohol: none    Medication Adherence/Access: no issues reported    Bipolar I Disorder:   Current medications:   Olanzapine 5mg QAM and 25mg QHS  Mirtazapine 60mg at bedtime   Hydroxyzine 50mg twice daily may take an additional tablet if needed for anxiety  [pt takes 100mg at night and 50mg in the morning if overly anxious]  Clonazepam 1mg twice daily and 2 tablets at bedtime [pt takes as 2mg BID]    Side effect management:   Cialis PRN not helpful    Ana Laura is a 50 year old with psychiatric history of bipolar 1 disorder. She has been seen in clinic by Dr. Rodriguez for many years but will be transitioning to a new provider given Dr. Rodriguez's USP. Her most recent visit was 8/3/21 with Dr. Sly nAdrews at which time no medication changes were made. She has an upcoming visit with Dr. Marleni Benavides on 8/24/21 and a return visit scheduled with Dr. Andrews on 11/2/21.     In regards to medication, it is noted that both pt and Dr. Rodriguez had indicated that pt's current medication regimen has been the most helpful for her for some time. She has numerous past medication trials, due to time, we were able to review some but not all past medications today. Pt was referred to MTM given concern for medication-related sexual side effects.       Today, Ana Laura reports that she has been dealing with low libido and inability to achieve orgasm through sex. She states that this has made sex unenjoyable and frustrating. She did note that she can achieve orgasm by self-stimulation. She stated that her sexual issues started around 2017 when she  "started olanzapine. She has been on mirtazapine for 20 years and does not feel this has caused any sexual side effects. She has tried Cialis and Viagra, neither of which have been at all helpful. She noted that while her current regimen of olanzapine, mirtazapine, has been helpful for some time, over the last few weeks she has noticed her mood has begun to decline. She believes she is in a mixed episode and endorses low mood, shamefulness, increased crying but also impulsivity, making poor financial and sexual choices. She believes an increase in life stressors is likely a contributing factor to her current mental health struggles.      Select past medication trials reviewed today:    - Wellbutrin SR up to 100mg BID, trialed in 2012 and stopped because it made her feel \"out of touch with life\" per chart review  - Buspar up to 22.5mg BID, trialed in 2019 and stopped to due concern of worsening depression and suicidal ideation  - Seroquel, trialed in 2017, stopped due to \"gaining 55 pounds\". She is not willing to retry Seroquel  -Abilify, trialed in 2010, listed as a medication allergy due to rash. Per 11/25/2010 hospital note, it appears pt experienced a rash while on Lamictal +Abilify under the care of Dr. Karimi at [facility]. Retrial of Abilify may have led to recurrent rash.   - Geodon up to 160mg, trialed in 7003-9825, stopped due to waning efficacy  - Latuda, trials in 2015, unclear reason for stopping      **Unable to review other medications/conditions today due to time  ----------------      I spent 60 minutes with this patient today.  A copy of the visit note was provided to the patient's referring and incoming psychiatry providers.    The patient was sent via Med[employer] a summary of these recommendations.     Pratibha Fox, PharmD, BCPP  Medication Therapy Management Pharmacist  [facility]    Telemedicine Visit Details  Type of service:  Telephone visit  Start Time: 10:05 " AM  End Time: 11:05 AM  Originating Location (patient location): Home  Distant Location (provider location):  [facility]     Medication Therapy Recommendations  No medication therapy recommendations to display

## 2021-08-19 ENCOUNTER — TELEPHONE (OUTPATIENT)
Dept: PSYCHIATRY | Facility: CLINIC | Age: 51
End: 2021-08-19

## 2021-08-19 ENCOUNTER — VIRTUAL VISIT (OUTPATIENT)
Dept: PHARMACY | Facility: CLINIC | Age: 51
End: 2021-08-19
Attending: PSYCHIATRY & NEUROLOGY
Payer: COMMERCIAL

## 2021-08-19 DIAGNOSIS — F31.9 BIPOLAR I DISORDER (H): Primary | ICD-10-CM

## 2021-08-19 PROCEDURE — 99207 PR NO CHARGE LOS: CPT | Performed by: PHARMACIST

## 2021-08-19 NOTE — Clinical Note
Hi all,     Ccing you all on my note from MTM visit with this patient. Dr. Rodirguez referred her for sexual side effects, but I see she has recently been seen by Dr. Andrews and will be seeing Marleni tomorrow morning. This is a really tricky case and to be honest I am not sure what the best option is for her - please see my note for details and some thoughts.     Let me know if you have questions.     Thank you,     Pratibha Fox, PharmD, BCPP  Medication Therapy Management Pharmacist  [facility]

## 2021-08-19 NOTE — TELEPHONE ENCOUNTER
Writer received call from pt, who identified that she had her pharmacy review today and that the provider planned to forward med recommendations to Dr. Andrews.    Pt identified that she has been experiencing mixed episodes of linda and depression lately, where she has been crying and hysterical, making poor financial decisions, engaging in risky behaviors, and shoplifting, then experiencing depression related to feelings of guilt about her life and what she's been doing. She feels that these cycles have become more intense.    Pt identified no recent medication changes.    Pt identified supports of attending DBSA (depression bipolar support association), meeting with therapist regularly, attending a support group, no drinking for 9 months, supportive friends, having person from [employer] visit or call her.  Pt reported that her anxiety is being well-controlled with clonazepam and hydroxyzine. She is taking two hydroxyzine at night and 1-2 during the day as needed. She is using these PRN doses more often than in the past.    Routed to Dr. Andrews.

## 2021-08-23 ENCOUNTER — TELEPHONE (OUTPATIENT)
Dept: PSYCHIATRY | Facility: CLINIC | Age: 51
End: 2021-08-23

## 2021-08-23 NOTE — TELEPHONE ENCOUNTER
Marlonr called pt and reviewed provider recommendation from 8/19/21 telephone encounter. Pt identified understanding. She reported that her SI is throughout the day, with no time being more significant than others. Writer prompted pt that she could increase her 5 mg morning dose to 10 mg and then have one additional 5 mg dose available later in the day.  During the call the pt presented with some tearfulness.    Pt agreed to urgent appointment on 8/24 @ 8AM. She requested this be by phone, but identified willingness to do video if needed.    Marlonr confirmed with Dr. Benavides that the 8/24 appointment needs to be video or in-person.    Routed message to scheduling to convert held time.  Marlonr coordinated with pt, who requested that the appointment link be sent to her email, [email or web address]. Added to appointment note.

## 2021-08-23 NOTE — PATIENT INSTRUCTIONS
Recommendations from today's MTM visit:                                                      Please follow-up with Dr. Benavides 8/24/21 for further discussion of medication options.     It was great to speak with you today.  I value your experience and would be very thankful for your time with providing feedback on our clinic survey. You may receive a survey via email or text message in the next few days.     To schedule another MTM appointment, please call the clinic directly or you may call the MTM scheduling line at [phone] or toll-free at [phone].     My Clinical Pharmacist's contact information:                                                      Please feel free to contact me with any questions or concerns you have.      Pratibha Fox, PharmD, BCPP  Medication Therapy Management Pharmacist  [facility]

## 2021-08-23 NOTE — TELEPHONE ENCOUNTER
Received a call from patient who reports that she is in acute distress. She has been having suicidal ideation all weekend. She has been working with the pharmacist on her medications because she has been having breakthrough depression and undesirable side effects. She would like medication changes as recommended by the pharmacist to see if her depression can be controlled.     She has been having intermittent suicidal ideation throughout the day all weekend. She has a plan to overdose, and does have a lot of medications that she can overdose on. She denies intent at this moment. Her last attempt at suicide was 2 years ago, by opgregoryse. She has attempted about every 2 years, and these were all intentional. She said that she is at a really sad point and just feels really low.     She lives alone but has a good support system. Many support people, good friends, bipolar support group who are aware that she is struggling. She will call one of her friends and ask that they come to keep her company. She has a therapy appointment today from 9:30 to 10:30 am.    She does not want to go to the hospital and believes that instituting some medication changes will help.    Routed to ORA Pearl to follow up on (related to telephone encounter 8/19/21).

## 2021-08-23 NOTE — TELEPHONE ENCOUNTER
Tunde Bennett,    Thanks for the message.    I will suggest that Ana Laura can take some PRN olanzapine.  Her current dose is 5 mg in the morning and 25 mg at night.  She can take an extra 5 mg up to twice daily as needed for mood symptoms.    If things worsen, especially if she starts being concerned about strong suicidal thoughts, she should go to the ER.    Unfortunately I am booked out several months and do not have much capacity to see her, but if needed we could try to arrange an early urgent appointment with one of the  residents.     DB           =========================================      See 8/23/21 telephone encounter for further information.

## 2021-08-24 ENCOUNTER — VIRTUAL VISIT (OUTPATIENT)
Dept: PSYCHIATRY | Facility: CLINIC | Age: 51
End: 2021-08-24
Attending: PSYCHIATRY & NEUROLOGY
Payer: COMMERCIAL

## 2021-08-24 DIAGNOSIS — F31.32 BIPOLAR AFFECTIVE DISORDER, CURRENTLY DEPRESSED, MODERATE (H): Primary | ICD-10-CM

## 2021-08-24 PROCEDURE — 99214 OFFICE O/P EST MOD 30 MIN: CPT | Mod: GC | Performed by: STUDENT IN AN ORGANIZED HEALTH CARE EDUCATION/TRAINING PROGRAM

## 2021-08-24 ASSESSMENT — PAIN SCALES - GENERAL: PAINLEVEL: NO PAIN (0)

## 2021-08-24 NOTE — PATIENT INSTRUCTIONS
**For crisis resources, please see the information at the end of this document**     Patient Education      Thank you for coming to the [facility].    Lab Testing:  If you had lab testing today and your results are reassuring or normal they will be mailed to you or sent through [employer] within 7 days. If the lab tests need quick action we will call you with the results. The phone number we will call with results is # [phone] (home) . If this is not the best number please call our clinic and change the number.    Medication Refills:  If you need any refills please call your pharmacy and they will contact us. Our fax number for refills is [phone]. Please allow three business for refill processing. If you need to  your refill at a new pharmacy, please contact the new pharmacy directly. The new pharmacy will help you get your medications transferred.     Scheduling:  If you have any concerns about today's visit or wish to schedule another appointment please call our office during normal business hours [phone] (8-5:00 M-F)    Contact Us:  Please call [phone] during business hours (8-5:00 M-F).  If after clinic hours, or on the weekend, please call  [phone].    Financial Assistance [phone]  [facility] Billing [phone]  Central Billing Office, [facility]: [phone]  [facility] Billing [phone]  Medical Records [phone]  [facility] Patient Bill of Rights [email or web address]       MENTAL HEALTH CRISIS NUMBERS:  For a medical emergency please call  911 or go to the nearest ER.     [facility]:   [facility] -[phone]   Crisis Residence [facility] Residence -[phone]   Walk[facility]In [facility] [place] -[phone]   COPE 24/7 [facility] Mobile Team -[phone] (adults)/[phone] (child)  CHILD: [facility] needs assessment  team - [phone]      [facility]:   [facility] - [phone]   Walk-in counseling [facility] - [phone]   Walk-in counseling [facility] - [phone]   Crisis Residence [facility] - [phone]  Urgent Care Adult Mental [facility]-[phone] mobile unit/ 24/7 crisis line    National Crisis Numbers:   National Suicide Prevention Lifeline: 8-741-672-TALK (096-995-8980)  Poison Control Center - 3-757-147-5294  [employer]/resources for a list of additional resources (SOS)  Trans Lifeline a hotline for transgender people 1-619.177.2705  [employer] a hotline for LGBT youth 3-411-058-6370  Crisis Text Line: For any crisis 24/7   To: 559263  see www.crisistextline.org  - IF MAKING A CALL FEELS TOO HARD, send a text!         Again thank you for choosing [facility] M[facility]AL HE[facility] & ADDICTION [facility] and please let us know how we can best partner with you to improve you and your family's health.    You may be receiving a survey regarding this appointment. We would love to have your feedback, both positive and negative. The survey is done by an external company, so your answers are anonymous.

## 2021-08-24 NOTE — PROGRESS NOTES
Video- Visit Details  Type of service:  video visit for medication management  Time of service:    Date:  08/24/2021    Video Start Time:  8:00am     Video End Time:  8:40am    Reason for video visit:  Patient has requested telehealth visit  Originating Site (patient location):  [facility]- [place]   Location- Patient's home  Distant Site (provider location):  [facility]  Mode of Communication:  Video Conference via [employer]  Consent:  Patient has given verbal consent for video visit?: Yes        [facility]  Psychiatry Clinic  PSYCHIATRY PROGRESS NOTE     CARE TEAM:  PCP- Liborio Lincoln   Therapist- Has therapist she sees every week       Ana Laura Wharton is a 50 year old female who prefers the name Ana Laura & pronouns she, her, hers.  She is a long-term patient of Dr. Rodriguez's who is transferring to Dr. Andrews.  She is seeing me today for an urgent add-on after calling expressing increased suicidal ideation yesterday.     Pertinent Background:  Previous diagnoses include bipolar 1 disorder, unspecified anxiety disorder, and alcohol use disorder, severe, currently in sustained remission. .      Psych pertinent item history includes suicide attempt [xMany.  A number of these have been severe, potentially lethal, and led to ICU admissions] and mutiple psychotropic trials .    DIAGNOSIS     Bipolar 1 disorder, currently depressed, mild to moderate severity  Unspecified mood dysregulation (possibly personality development related)  Unspecified anxiety disorder  Alcohol use disorder, severe, currently in sustained remission  Medical complications of alcohol use, including pancreatitis and cirrhosis     PLAN         1) PSYCHOTROPIC MEDICATIONS:  - No med changes made today    - Continue Olanzapine 5mg QAM, 25mg at bedtime with additional 5mg BID (patient taking at noon and 4pm)  - Continue Mirtazapine 60mg at bedtime  - Continue Hydroxyzine 50mg BID  - Continue Clonazepam 2mg BID     2)  "[place]  was checked today indicates no misuse    3) THERAPY:    She has a therapist she sees every Saturday     4) NEXT DUE:  Neuroleptic monitoring labs due July 2022    5) REFERRALS:    None     6) RTC: November with Dr. Andrews    7) CRISIS NUMBERS:   Provided routinely in AVS     TREATMENT RISK STATEMENT:  The risks, benefits, alternatives and potential adverse effects have been discussed and are understood by the pt. The pt understands the risks of using street drugs or alcohol. There are no medical contraindications, the pt agrees to treatment with the ability to do so. The pt knows to call the clinic for any problems or to access emergency care if needed.  Medical and substance use concerns are documented above.  Psychotropic drug interaction check was done, including changes made today.    HISTORY        - Ana Laura presents for urgent add-on today after calling clinic yesterday expressing increased suicidal ideation with plan to overdose and desire to make a medication change.   - Today Ana Laura reports she is feeling \"much better\".  Dr. Andrews made recommendation yesterday to increase patient's Olanzapine by adding an additional 5mg BID dose.  Patient reports this was very helpful and she had a good day yesterday without suicidal ideation.  - She reports her suicidal ideation was at a 7-9 yesterday before taking increased Olanzapine and after taking increased Olanzapine it is at a 1.  She denies active suicidal ideation at the time of our appointment.  - She describes having numerous social supports in place including friends who are aware of her mental illness and can come stay with her, a religous figure who she checks in with once per day, a therapist she sees weekly, and [facility] which she attends twice weekly.   - She does describe having increased stress lately related to being in beginning process of searching for a new house. She also has been under some financial stress.  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  She was offered a job and is looking forward to starting.   - She endorses \"taking one hit' of marijuana approximately 4 times per week and does acknowledge that this seems to worsen her mental health symptoms (both linda and depression).   - She denies any weight gain from Olanzapine and in fact reports she feels it has contributed to weight loss.  She notes her appetite is the same as it always is but she has lost a little weigh and notices her clothes fit more loosely.   - She has endorsed sexual side effects (anorgasmia) related to Olanzapine, however addressing this was out of the scope of our urgent appointment today and she will follow up with Dr. Andrews regarding these concerns.   - She denies SI, SIB, HI, AH, VH and reports she feels safe at home.  She is able to safety plan and reports she will call someone from her support team if her symptoms worsen and if she feels unsafe being at home she will call crisis numbers, our clinic, or get a ride to the ED.   - She is future oriented and plans to work on updating her resume today to apply for a job.     PAST MED TRIALS          Ana Laura has had multiple medication trials, including most mood stabilizers, antidepressants, and a number of second-generation antipsychotics.  She and Dr. Rodriguez both feel that her combination of medications is clearly the best that she has been on.  I will review past medication trials with Ana Laura in more detail at her next visit.    ALLERGY       Ciprofloxacin, Compazine [prochlorperazine], Metoclopramide, Reglan [metoclopramide hcl], Restoril [temazepam], Thorazine [chlorpromazine], Abilify [aripiprazole], and Lamotrigine  MEDICATIONS       Current Outpatient Medications   Medication Sig Dispense Refill     acetaminophen (TYLENOL) 500 MG tablet Take 1-2 tablets (500-1,000 mg) by mouth every 6 hours as needed for mild pain       albuterol (PROAIR " HFA/PROVENTIL HFA/VENTOLIN HFA) 108 (90 Base) MCG/ACT inhaler Inhale 2 puffs into the lungs every 6 hours as needed for shortness of breath / dyspnea or wheezing 1 Inhaler 0     atorvastatin (LIPITOR) 10 MG tablet TAKE 1 TABLET EVERY DAY 90 tablet 3     carvedilol (COREG) 12.5 MG tablet TAKE 1 TABLET TWICE DAILY WITH MEALS 180 tablet 3     clindamycin (CLEOCIN T) 1 % external lotion Apply topically daily 60 mL 3     clobetasol (TEMOVATE) 0.05 % external cream Apply topically 2 times daily 60 g 1     clonazePAM (KLONOPIN) 1 MG tablet Take 1 tablet (1 mg) by mouth 2 times daily AND 2 tablets (2 mg) At Bedtime. 360 tablet 1     cyclobenzaprine (FLEXERIL) 10 MG tablet Take 1 tablet (10 mg) by mouth At Bedtime 90 tablet 1     Emollient (HYDROPHOR EX) Externally apply topically At Bedtime       Emollient (HYDROPHOR) OINT Externally apply topically daily 454 g 3     folic acid (FOLVITE) 400 MCG tablet Take 400 mcg by mouth daily       hydrOXYzine (VISTARIL) 50 MG capsule Take 1 capsule (50 mg) by mouth 2 times daily. May also take 1 capsule (50 mg) daily as needed for anxiety. 270 capsule 1     ketorolac (TORADOL) 30 MG/ML injection every 7 days       levothyroxine (SYNTHROID/LEVOTHROID) 50 MCG tablet TAKE 1 TABLET EVERY DAY 90 tablet 3     mirtazapine (REMERON) 30 MG tablet Take 2 tablets (60 mg) by mouth At Bedtime 180 tablet 1     Multiple Vitamins-Minerals (WOMENS MULTI VITAMIN & MINERAL PO) Take 1 tablet by mouth daily       nitroFURantoin macrocrystal-monohydrate (MACROBID) 100 MG capsule Take 1 capsule (100 mg) by mouth 2 times daily Take one pill bid x 7days   Then decrease to one daily  until follow up appointment 90 capsule 0     norethindrone (MICRONOR) 0.35 MG tablet Take 1 tablet (0.35 mg) by mouth daily continuously 90 tablet 4     OLANZapine (ZYPREXA) 10 MG tablet Take 1 tablet (10 mg) by mouth At Bedtime (Patient taking differently: Take 20 mg by mouth At Bedtime ) 90 tablet 0     OLANZapine (ZYPREXA) 5 MG  "tablet Take 1 tab each AM and 1 tab at bedtime. 180 tablet 0     ondansetron (ZOFRAN) 4 MG tablet Take 1 tablet (4 mg) by mouth every 8 hours as needed for nausea 30 tablet 0     pantoprazole (PROTONIX) 40 MG EC tablet TAKE 1 TABLET BY MOUTH  TWICE DAILY 180 tablet 1     polyethylene glycol (MIRALAX) 17 g packet Take 1 packet by mouth daily as needed for constipation       Sennosides-Docusate Sodium (SENNA S PO)        sodium chloride (OCEAN) 0.65 % nasal spray Use in both nostrils 3 times a day. 30 mL 1     sulfamethoxazole-trimethoprim (BACTRIM DS) 800-160 MG tablet Take 1 tablet by mouth 2 times daily 14 tablet 0     Syringe/Needle, Disp, (SYRINGE LUER LOCK) 25G X 1\" 3 ML MISC 1 Act once a week 10 each 0     tadalafil (CIALIS) 20 MG tablet Take 1 tablet (20 mg) by mouth daily as needed (sexual dysfunction) 90 tablet 0     VITALS       There were no vitals taken for this visit.   MENTAL STATUS EXAM           Alertness: alert  and oriented  Appearance: well groomed  Behavior/Demeanor: cooperative, pleasant and calm, with good  eye contact   Speech: regular rate and rhythm  Language: intact  Psychomotor: normal or unremarkable  Mood: \"feeling much better\"  Affect: full range; was congruent to mood; was   congruent to content  Thought Process/Associations: unremarkable  Thought Content:  Reports none;  Denies violent ideation and delusions and active suicidal ideation (did have suicidal ideation yesterday before med increase)  Perception:  Reports none;  Denies hallucinations  Insight: adequate  Judgment: fair  Cognition: (6) oriented: time, person, and place  attention span: intact  concentration: intact  recent memory: intact  remote memory: intact  fund of knowledge: appropriate  Gait and Station: N/A (telehealth)    RISK STATEMENT for SAFETY     During our appointment today patient denied active suicidal ideation.  She has experienced suicidal ideation and plan as recent as yesterday. Risk factors for self-harm: " previous suicide attempt and recent symptom worsening.  Mitigating factors: recent medication change, describes a safety plan, h/o seeking help , minimal substance use  and good social support  .  The patient does not appear to be at imminent risk for self-harm, hospitalization is not recommended which the pt does  agree to. No hospitalization will be arranged.     PROVIDER:  Marleni Benavides DO    This patient was staffed via telephone with Dr. Newman who will sign the note.

## 2021-08-24 NOTE — PROGRESS NOTES
"VIDEO VISIT  Ana Laura Wharton is a 50 year old patient who is being evaluated via a billable video visit.      The patient has been notified of following:   \"This video visit will be conducted via a call between you and your physician/provider. We have found that certain health care needs can be provided without the need for an in-person physical exam. This service lets us provide the care you need with a video conversation. If a prescription is necessary we can send it directly to your pharmacy. If lab work is needed we can place an order for that and you can then stop by our lab to have the test done at a later time. Insurers are generally covering virtual visits as they would in-office visits so billing should not be different than normal.  If for some reason you do get billed incorrectly, you should contact the billing office to correct it and that number is in the AVS .    Video Conference to be completed via:  Mindy    Patient has given verbal consent for video visit?:  Yes    Patient would prefer that any video invitations be sent by: Send to e-mail at: [email or web address]      How would patient like to obtain AVS?:  [facility]    AVS SmartPhrase [PsychAVS] has been placed in 'Patient Instructions':  Yes  "

## 2021-09-16 ENCOUNTER — NURSE TRIAGE (OUTPATIENT)
Dept: NURSING | Facility: CLINIC | Age: 51
End: 2021-09-16

## 2021-09-16 DIAGNOSIS — F31.9 BIPOLAR I DISORDER (H): ICD-10-CM

## 2021-09-17 ENCOUNTER — TELEPHONE (OUTPATIENT)
Dept: PSYCHIATRY | Facility: CLINIC | Age: 51
End: 2021-09-17

## 2021-09-17 RX ORDER — OLANZAPINE 5 MG/1
TABLET ORAL
Qty: 360 TABLET | Refills: 0 | Status: SHIPPED | OUTPATIENT
Start: 2021-09-17 | End: 2021-01-01

## 2021-09-17 RX ORDER — OLANZAPINE 20 MG/1
20 TABLET ORAL AT BEDTIME
Qty: 90 TABLET | Refills: 0 | Status: SHIPPED | OUTPATIENT
Start: 2021-09-17 | End: 2021-01-01

## 2021-09-17 NOTE — TELEPHONE ENCOUNTER
Pt called in states she is constipated.  The Pt normal BM was every day.  The Pt is strain to have BM without result.  The Pt has no BM for the last 2 weeks.  No blood in stool.  The Pt has history of constipation and go to ED.  Pt stomach is very distended.  Pt tried laxative and enema and not working.  The Pt states her abdomin is tender to touch.  No fever, no vomit. Senna tablet 4 table,  miralax 3 times, enema didn't even go.  On call provider was paged   Dr. Morataya states Pt can take 10 -20 ml of Magnesium Citrate every couple hours. Stay Hydrated and take dulcolax stool softener.  Provider message was relayed for the Pt.  The Pt verbalized understand, no other concern at this time.      Tato Galdamez Nurse Advisor 9/16/2021 9:36 PM        Reason for Disposition    [1] Rectal pain or fullness from fecal impaction (rectum full of stool) AND [2] NOT better after SITZ bath, suppository or enema    Additional Information    Negative: [1] Abdomen pain is main symptom AND [2] male    Negative: [1] Abdomen pain is main symptom AND [2] adult female    Negative: Rectal bleeding or blood in stool is main symptom    Negative: Rectal pain or itching is main symptom    Negative: Constipation in a cancer patient who is currently (or recently) receiving chemotherapy or radiation therapy, or cancer patient who has metastatic or end-stage cancer and is receiving palliative care    Negative: [1] Constant abdominal pain AND [2] present > 2 hours    Negative: [1] Vomiting AND [2] contains bile (green color)    Negative: [1] Vomiting AND [2] abdomen looks much more swollen than usual    Negative: Patient sounds very sick or weak to the triager    Protocols used: CONSTIPATION-A-AH

## 2021-09-17 NOTE — TELEPHONE ENCOUNTER
Writer received call from pt, who presented tearfully and identified that she was checking in for her 9/22 appointment and that in [facility] she had a diagnosis of bipolar I disorder in full remission and that this let to her bipolar I diagnosis being removed from her chart. Pt reported that she is concerned about this because of how it might affect her Social Security disability.  Writer reviewed the 8/24/21 virtual visit having the bipolar I disorder diagnosis and this appearing in the problem list in the chart snapshot.  Writer identified plan to forward to clinic manager regarding this.

## 2021-09-17 NOTE — TELEPHONE ENCOUNTER
Last seen: 8/24  RTC: November  Non-provider cancel: None  No-show: None  Next appt: 11/2     Incoming refill from [facility] - [address], [address] via electronic request     Medication requested:   Disp Refills Start End ADELAIDA   OLANZapine (ZYPREXA) 5 MG tablet 180 tablet 0 8/4/2021  No   Sig: Take 1 tab each AM and 1 tab at bedtime.        Per 8/24/21 virtual visit note:  - Continue Olanzapine 5mg QAM, 25mg at bedtime with additional 5mg BID (patient taking at noon and 4pm)    Medication refill approved per refill protocol.  Routed to Dr. Andrews for 90-day approval.            09/17/21 0901 Rhea Andrews, Sly Jones MD   E-Prescribing Status: Receipt confirmed by pharmacy (9/17/2021  9:01 AM CDT)

## 2021-09-20 NOTE — TELEPHONE ENCOUNTER
Received fax from [facility] requesting confirmation of pt's Zyprexa dose. Writer provided this to pharmacist Lisa.

## 2021-09-21 ENCOUNTER — TELEPHONE (OUTPATIENT)
Dept: UROLOGY | Facility: CLINIC | Age: 51
End: 2021-09-21

## 2021-09-21 NOTE — TELEPHONE ENCOUNTER
"Writer received call from pt, following up on status of diagnoses in [facility].  Pt clarified that what shows up in [facility] under menu / health summary is Bipolar (H). The \"depressed bipolar I disorder in full remission\" shows up when she does an e-check in.    Pt confirmed that when she looks at the 8/3/21 progress note she sees the following diagnoses:  Bipolar 1 disorder, currently depressed, mild to moderate severity  Unspecified anxiety disorder  Alcohol use disorder, severe, currently in sustained remission  Medical complications of alcohol use, including pancreatitis and cirrhosis  Rule out cluster B or C personality traits    Routed to Shaneka Zaman.        =========================================      From: Shaneka Zaman   Sent: 9/21/2021  12:33 PM CDT   To: Sly Herbert RN     Hello,   I called and spoke to HIM and they are the correct people for her to reach out to.   Thanks,   Shaneka         "

## 2021-09-21 NOTE — TELEPHONE ENCOUNTER
Spoke to patient she does not want to see DR Mrorison. Is so happy with Dr Feldman  And his care . She is not interested in the Interstim  And  Is happy with doing  [facility]. Will cancel her appointment with DR Morrison  And see when Dr Feldman will need to see her back ?

## 2021-09-21 NOTE — TELEPHONE ENCOUNTER
[facility] Call Center    Phone Message    May a detailed message be left on voicemail: yes     Reason for Call: Other: Pt does not want to transfer her care to Dr. Morrison she wants to maintain receiving care from Dr. Feldman. She feels things are better with the regimen that Dr Feldman has her on and wants to continue with that. She has an appt with Dr. Morrison tomorrow and would like to talk with someone before that appt. Please call Pt. to discuss. Thanks.     Action Taken: Other: uro    Travel Screening: Not Applicable

## 2021-09-21 NOTE — TELEPHONE ENCOUNTER
From: Shaneka Zaman   Sent: 9/20/2021  12:22 PM CDT   To: Mary Jane Craig     I think it should first go to the provider. If they agree to change it we don't need to take any further action. If they don't agree, the pt will need to contact HIM and submit a written request.

## 2021-09-22 NOTE — TELEPHONE ENCOUNTER
Writer called pt and reviewed manager's recommendation for pt to contact medical records and provided contact number. Pt identified understanding.

## 2021-09-23 ENCOUNTER — TELEPHONE (OUTPATIENT)
Dept: PSYCHIATRY | Facility: CLINIC | Age: 51
End: 2021-09-23

## 2021-09-23 DIAGNOSIS — E78.5 HYPERLIPIDEMIA LDL GOAL <130: ICD-10-CM

## 2021-09-23 DIAGNOSIS — I10 BENIGN ESSENTIAL HYPERTENSION: ICD-10-CM

## 2021-09-23 DIAGNOSIS — F31.9 BIPOLAR I DISORDER (H): ICD-10-CM

## 2021-09-23 DIAGNOSIS — E03.9 ACQUIRED HYPOTHYROIDISM: ICD-10-CM

## 2021-09-23 NOTE — TELEPHONE ENCOUNTER
Writer received fax indicating that a PA is needed for pt's Zyprexa 5 mg. Submitted PA via [employer]. Key=[id number].      [facility] is reviewing your PA request. Typically an electronic response will be received within 72 hours. To check for an update later, open this request from your dashboard.  You may close this dialog and return to your dashboard to perform other tasks.      Approvedtoday  Request Reference Number: [id number]. OLANZAPINE TAB 5MG is approved through 12/31/2021. Your patient may now fill this prescription and it will be covered.    Writer called pharmacy ([phone]) and identified the approval for Doris. She identified that the processing of the order had been started.        Previously Tried and Failed:    Amitriptyline   Aripiprazole (prior to care at [facility]): caused peeling skin  Chlorpromazine (04/2014): caused akathisia  Depakote: caused significant sedation  Geodon (08/2012-01/2014): built up tolerance, no longer effective  Lamictal: caused peeling skin  Latuda  Lithium: built up tolerance, no longer effective. Also led to significance GI upset  Mirtazapine (current)  Quetiapine: significant weight gain   *Pt has tried other medications prior to care at the      Rationale:   Pt has struggled with her mental health most of her life. She has a history of trauma in her childhood and throughout her lifetime. Ana Laura was initially diagnosed with major depressive disorder and had numerous hospitalizations for SI/SIB. In 2005, her therapist realized she was experiencing cycling and was concerned for Bipolar disorder. This included impulsivity, increased spending, elevated mood, and decreased need for sleep. The hypomania/linda would last for about two weeks and then she would crash and become extremely depression. Pt reports this cycling had been occurring since the late 90s, but didn't realize this could be related to Bipolar disorder. She later received this diagnosis while hospitalized.  Overall, Ana Laura has been hospitalized 30+ times for mental health concerns. Typically due to SI/SIB. She has attempted suicide by overdose of Klonopin. Along with the significant cycling in her mood, the pt reports psychosis symptoms. The most distressing is paranoia.   In August 2021 the patient experienced an increase in suicidal ideation, with accompanying plan to overdose on her medications. At that time her Zyprexa dose was increased to 25 mg at bedtime and three times daily, resulting in significant reduction in her suicidal ideation and mood stabilization. Since that time the patient has been stable on this dose and should remain on this dose for an undetermined time. Given her history of suicide attempts and hospitalizations, the patient's stability would be jeopardized if a reduction in dose occurred at this time. Denying the medication now would definitely lead to decompensation and another hospitalization.

## 2021-09-23 NOTE — TELEPHONE ENCOUNTER
Leticia Al David J, RN  Phone Number: [phone]     Roxy Walton called from Medical Records. Pt had called them about her concerns about what was being said/listed on her problems list. Roxy said that they would not be able to make any changes to the list and would be more than to speak with you in more detail.     Phone: [phone]       Thank you,   Leticia         =========================================      Writer writer called Ms. Andrade, who identified that the provider needs to change the problem list. If the provider is unable to, then an IT ticket for assistance in updating pt's problem list could be placed.  Ms. Andrade also identified that she could be emailed for follow up if there is difficulty in getting help with this. [email or web address].      Routed to Dr. Andrews for guidance.

## 2021-09-24 ENCOUNTER — TELEPHONE (OUTPATIENT)
Dept: PSYCHIATRY | Facility: CLINIC | Age: 51
End: 2021-09-24

## 2021-09-24 DIAGNOSIS — K76.9 CHRONIC LIVER DISEASE: Primary | ICD-10-CM

## 2021-09-24 RX ORDER — ATORVASTATIN CALCIUM 10 MG/1
TABLET, FILM COATED ORAL
Qty: 90 TABLET | Refills: 1 | Status: SHIPPED | OUTPATIENT
Start: 2021-09-24 | End: 2022-01-01

## 2021-09-24 RX ORDER — CYCLOBENZAPRINE HCL 10 MG
TABLET ORAL
Qty: 90 TABLET | Refills: 1 | Status: SHIPPED | OUTPATIENT
Start: 2021-09-24 | End: 2022-01-01

## 2021-09-24 RX ORDER — CARVEDILOL 12.5 MG/1
TABLET ORAL
Qty: 180 TABLET | Refills: 1 | Status: SHIPPED | OUTPATIENT
Start: 2021-09-24 | End: 2022-01-01

## 2021-09-24 RX ORDER — LEVOTHYROXINE SODIUM 50 UG/1
TABLET ORAL
Qty: 90 TABLET | Refills: 1 | Status: SHIPPED | OUTPATIENT
Start: 2021-09-24 | End: 2022-01-01

## 2021-09-24 NOTE — TELEPHONE ENCOUNTER
Routing refill request to provider for review/approval because:  Has an upcoming appointment in January and is requesting a years supply.

## 2021-09-24 NOTE — TELEPHONE ENCOUNTER
Writer coordinated with providers to have diagnosis of bipolar in remission removed from pt's chart.    Writer left voice mail message for pt, identifying that the concerning diagnosis had been removed and prompted pt to contact clinic with any concerns.        =========================================      Marleni Benavides MD Snyder, David J, RN; Izzy Newman MD; Sly Andrews MD  Caller: Unspecified (1 week ago,  9:35 AM)  Okay I deleted that diagnosis from her patient snapshot page.  I will keep everything from my note on 08/24 the same as that seems accurate.  Let me know if something else needs to be done.             Previous Messages       ----- Message -----   From: Sly Herbert RN   Sent: 9/24/2021  12:35 PM CDT   To: Izzy Newman MD, Sly Andrews MD, *     The problem list that appears in the patient snapshot has all of these diagnoses:   Alcohol withdrawal syndrome without complication (H)   Personality disorder (H)   Bipolar I disorder (H)   Depression   Mood disorder (H)   Bipolar disorder current episode depressed (H)   Anxiety disorder   Depressed bipolar I disorder in full remission (H)   Impulse control disorder   Opioid dependence (H)   Anxiety state   Other bipolar disorders     The third of the four bipolar diagnoses is the one most worrisome to the patient.   ----- Message -----   From: Marleni Benavides MD   Sent: 9/24/2021  12:31 PM CDT   To: Izzy Newman MD, Sly Andrews MD, *     I looked back at my note and the diagnosis I wrote in the note is Bipolar disorder, depressed, moderate.  This is also what the visit diagnosis is listed as for that visit.  I am not seeing a bipolar disorder in remission in my note from 08/24- am I missing something?   ----- Message -----   From: Izzy Newman MD   Sent: 9/24/2021  11:07 AM CDT   To: Sly Andrews MD, Sly Herbert RN, *     Yes, no problem   Can reassure the pt the diagnosis will be changed     Marleni    Can you take care of this please?   Please LMK       DX should be: Bipolar 1 disorder, depressed, moderate severity   Dx of full remission was a miscommunication.   Needs to be corrected in the Aug 24 note     ----- Message -----   From: Sly Andrews MD   Sent: 9/24/2021   9:22 AM CDT   To: Izzy Newman MD, Sly Herbert, RN, *     Hi Donald,    Thanks for the message.    Last saw Ana Laura on August 3.  In my note I diagnosed her with bipolar 1 disorder, depressed, mild to moderate severity.  In the problem list simply listed bipolar disorder.  I have done an addendum to the note and changed the diagnosis in the problem list to bipolar 1 disorder, depressed, moderate severity.    It was Marleni Benavides she saw on August 24, staffed by Dr. Newman.  If I understand correctly this is the note where the diagnosis was changed to bipolar disorder in full remission.    Marleni and Thi, see the message thread below.  If you feel it is appropriate, would you consider changing the diagnosis in the problem list for the August 24 visit to match mine?  It would relieve a lot of stress for Ana Laura.     DB

## 2021-09-24 NOTE — TELEPHONE ENCOUNTER
"Writer received call from pt, who presented tearfully, inquiring on status of update of diagnoses in her chart. Writer reviewed status of that.    Writer further explored pt concerns that could be causing presenting condition.  Pt reported that she had recently tried working as a  for four days and became overwhelmed and was unable to work further. Pt stated that she has been experiencing feelings of being overwhelmed, crying, vomiting, diarrhea, and that she had a \"total, complete mental break with a lot of suicidal ideation around it.\" Pt identified that she has been feeling better since Monday, she has friends for support, and knows if she needs to come to ED that her friends will help her get there.  Pt identified that having this information in her chart and Dr. Andrews being aware of it is very important for both maintaining her SSA benefits and for her mental health.    Routed to Dr. Andrews.          "

## 2021-09-27 RX ORDER — OLANZAPINE 20 MG/1
TABLET ORAL
Qty: 90 TABLET | Refills: 3 | OUTPATIENT
Start: 2021-09-27

## 2021-09-27 RX ORDER — OLANZAPINE 5 MG/1
TABLET ORAL
Qty: 360 TABLET | Refills: 3 | OUTPATIENT
Start: 2021-09-27

## 2021-10-06 ENCOUNTER — LAB (OUTPATIENT)
Dept: LAB | Facility: CLINIC | Age: 51
End: 2021-10-06
Payer: COMMERCIAL

## 2021-10-06 DIAGNOSIS — R39.89 SUSPECTED UTI: Primary | ICD-10-CM

## 2021-10-06 DIAGNOSIS — R39.89 SUSPECTED UTI: ICD-10-CM

## 2021-10-06 LAB
ALBUMIN UR-MCNC: NEGATIVE MG/DL
APPEARANCE UR: ABNORMAL
BILIRUB UR QL STRIP: NEGATIVE
COLOR UR AUTO: ABNORMAL
GLUCOSE UR STRIP-MCNC: NEGATIVE MG/DL
HGB UR QL STRIP: ABNORMAL
KETONES UR STRIP-MCNC: NEGATIVE MG/DL
LEUKOCYTE ESTERASE UR QL STRIP: ABNORMAL
NITRATE UR QL: POSITIVE
PH UR STRIP: 7 [PH] (ref 5–7)
SP GR UR STRIP: 1.01 (ref 1–1.03)
UROBILINOGEN UR STRIP-MCNC: NORMAL MG/DL

## 2021-10-06 PROCEDURE — 81003 URINALYSIS AUTO W/O SCOPE: CPT | Mod: QW

## 2021-10-06 PROCEDURE — 87086 URINE CULTURE/COLONY COUNT: CPT

## 2021-10-07 LAB — BACTERIA UR CULT: ABNORMAL

## 2021-10-08 ENCOUNTER — MYC MEDICAL ADVICE (OUTPATIENT)
Dept: UROLOGY | Facility: CLINIC | Age: 51
End: 2021-10-08

## 2021-10-08 NOTE — PROGRESS NOTES
Called patient and LM that a yes to her question. Also informed her that a message was sent to her Mychart.     Evelin Martinez LPN

## 2021-10-08 NOTE — TELEPHONE ENCOUNTER
Ana Laura is calling, stating her results showed ecoli, wondering if nitroFURantoin macrocrystal-monohydrate (MACROBID) 100 MG capsule is the appropriate medication, please call pt or mychart message, thank you

## 2021-10-13 DIAGNOSIS — F31.9 BIPOLAR I DISORDER (H): ICD-10-CM

## 2021-10-17 RX ORDER — MIRTAZAPINE 30 MG/1
TABLET, FILM COATED ORAL
Qty: 180 TABLET | Refills: 3 | OUTPATIENT
Start: 2021-10-17

## 2021-10-19 ENCOUNTER — MEDICAL CORRESPONDENCE (OUTPATIENT)
Dept: HEALTH INFORMATION MANAGEMENT | Facility: CLINIC | Age: 51
End: 2021-10-19
Payer: COMMERCIAL

## 2021-10-21 NOTE — PROGRESS NOTES
"Service Date: 2020    \"Doing good\". Gets out daily. New friends. Careful about COVID 19. Sleep fair, except nocturia. Appetite good. Gained weight. No SI in months. Able to enjoy self. Concentration good. Energy fine. Gets tired about 7:30. No alcohol in 3 months.    Suicide thoughts: No    Psychiatry review of symptoms: Has not felt manic. No psychosis    Medications: See EMR    Allergies: None new    Medical ROS: Not sick at all. Healthy    Diagnosis: Bipolar I    Plan: No changes today    TELEPHONE VISIT  Kee Wharton is a 51 year old pt. who is being evaluated via a billable telephone visit.      The patient has been notified of the following:    We have found that certain health care needs can be provided without the need for a physical exam. This service lets us provide the care you need with a short phone conversation. If a prescription is necessary we can send it directly to your pharmacy. If lab work is needed we can place an order for that and you can then stop by our lab to have the test done at a later time. Insurers are generally covering virtual visits as they would in-office visits so billing should not be different than normal.  If for some reason you do get billed incorrectly, you should contact the billing office to correct it and that number is in the AVS .    Patient has given verbal consent for a telephone visit?: Yes      Start Time:  1:00 PM         End Time:  1:30 PM              Jaden Rodriguez MD        D: 10/21/2021   T: 10/21/2021   MT: FARHAT    Name:     KEE WHARTON  MRN:      [id number]        Account:      [id number]   :      1970           Service Date: 2020       Document: [id number]    "

## 2021-10-24 ENCOUNTER — HEALTH MAINTENANCE LETTER (OUTPATIENT)
Age: 51
End: 2021-10-24

## 2021-10-27 ENCOUNTER — ALLIED HEALTH/NURSE VISIT (OUTPATIENT)
Dept: INTERNAL MEDICINE | Facility: CLINIC | Age: 51
End: 2021-10-27
Payer: COMMERCIAL

## 2021-10-27 DIAGNOSIS — Z23 NEED FOR VACCINATION: ICD-10-CM

## 2021-10-27 DIAGNOSIS — Z23 NEED FOR PROPHYLACTIC VACCINATION AND INOCULATION AGAINST INFLUENZA: ICD-10-CM

## 2021-10-27 PROCEDURE — 90471 IMMUNIZATION ADMIN: CPT

## 2021-10-27 PROCEDURE — 90750 HZV VACC RECOMBINANT IM: CPT

## 2021-10-27 PROCEDURE — G0008 ADMIN INFLUENZA VIRUS VAC: HCPCS | Mod: 59

## 2021-10-27 PROCEDURE — 90682 RIV4 VACC RECOMBINANT DNA IM: CPT

## 2021-11-12 NOTE — TELEPHONE ENCOUNTER
Patient called looking for update on why refill was denied. Patient is concerned as she has about 2 weeks of refills left and uses [facility] for refills which can usually take some time to get her refills to her. Ana Laura requesting a call back to discuss reason for denied refill.

## 2021-11-12 NOTE — TELEPHONE ENCOUNTER
Last seen: 8/24  RTC: November with Dr. Andrews  Non-provider cancel: 11/2 Work/School Conflict  No-show: None  Next appt: 12/21     Incoming refill from [facility] - [facility] [address] [place] [address] [address] [facility], [address] via interface     Medication requested:   Disp Refills Start End ADELAIDA   OLANZapine (ZYPREXA) 20 MG tablet 90 tablet 0 9/17/2021  --   Sig - Route: Take 1 tablet (20 mg) by mouth At Bedtime along with one 5 mg tablet for total bedtime dose of 25 mg     From med dispense report:   Dispensed Days Supply Quantity Provider Pharmacy   OLANZAPINE  5 MG TABS 09/23/2021 90 360 tablet RICKY,TIMOTHY [facility]   OLANZAPINE  20 MG TABS 09/17/2021 90 90 tablet RICKY,TIMOTHY [facility]       Refill timeframe appropriate given transit time of medication.  Writer called pt and identified status. She requested a call or Bil[employer] message when refill is sent. She reported having sufficient 5 mg tablets on hand.    Refill routed to provider due to refill protocol (cancellation).

## 2021-11-15 NOTE — TELEPHONE ENCOUNTER
"Writer called pt to explore her concerns.    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    Pt identified that she wanted Dr. Andrews to be aware of this and for it to be noted in her chart.    Pt identified that the current Zyprexa regimen is working well for her. She presented with a calm voice with no signs of crying.    Routed to Dr. Andrews.               "

## 2021-11-15 NOTE — TELEPHONE ENCOUNTER
[facility] Call Center    Phone Message    May a detailed message be left on voicemail: yes     Reason for Call: Other: Patient request. Patient was suspended from work (was on disability leave) and wanted to discuss this with Sly or Dr. Andrews. She is hoping for a call back as soon as someone is available.     Action Taken: Message routed to:  Other: P [facility] [employer]    Travel Screening: Not Applicable

## 2021-11-16 NOTE — TELEPHONE ENCOUNTER
"Writer received incoming call from patient while having anxiety. Patient was tearful over the phone due to feeling stressed from losing her job and not knowing what will happen to her social security disability. 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 Patient felt the teacher was going very fast and she was not able to comprehend on what was going on. Shared she had 3 panic attacks in one day in class due to feeling stressed. Due to getting suspended, patient had decided to resign from the job. She is unsure how this will play out with her disability assistance. She is afraid they will no longer help her with medical benefits as well as financial assistance. Shared \" I have a list of think I need to provide them to straighten this mess out.\" Writer educated patient to take things day at a time. Educated her to have a goal to get few things done on the list per day. By the end of the call, patient was feeling better.    She is feeling safe and denies any SI/HI at this time. Shared she had SI over the weekend but was able to keep self safe by calling a friend. She has plan in place if safety is a concern. She will call a friend, on-call provider line or come in the ED. She requested this be passed along to provider as FYI.   "

## 2021-11-18 NOTE — TELEPHONE ENCOUNTER
Medication requested: OLANZAPINE  5MG  TAB  Take 1 tablet (5 mg) by mouth At Bedtime AND 1 tablet (5 mg) 3 times daily  Last refilled: 9/17/21  Qty: 360/0       Last seen: 8/24  RTC: November with Dr. Andrews  Non-provider cancel: 11/2 Work/School Conflict  No-show: None  Next appt: 12/21    Too soon for refill #90 day sent 9/17/21 to [facility] - [facility] - [address], [address]  One year request denied

## 2021-11-18 NOTE — TELEPHONE ENCOUNTER
[facility] Call Center    Phone Message    May a detailed message be left on voicemail: yes     Reason for Call: Medication Refill Request    Has the patient contacted the pharmacy for the refill? Yes   Name of medication being requested: olanzapine 20 mg, 90 day supply  Provider who prescribed the medication: Bond  Pharmacy:      [email or web address] [address], [address]      Date medication is needed: pt has a couple days left, refill date is tomorrow and pt making sure refill goes through      Action Taken: Message routed to:  Other: nursing pool    Travel Screening: Not Applicable

## 2021-11-19 NOTE — TELEPHONE ENCOUNTER
Writer called pharmacy ([phone]) and was informed that olanzapine 20 mg was last filled 9/17, sent 9/21. The order is on auto-fill and will fill on 12/17.      From med dispense history:  OLANZAPINE  20 MG TABS 09/17/2021 90 90 tablet [facility]       Writer called pt, who identified that she has a good supply of 20 mg tablets, but she had received a notice that a refill was denied by our clinic. Per chart, the 11/17/21 refill of 5 mg tablets was denied as too soon.  See that encounter for resolution.

## 2021-11-19 NOTE — TELEPHONE ENCOUNTER
Last seen: 8/24  RTC: November with Dr. Andrews  Non-provider cancel: 11/2 Work/School Conflict  No-show: None  Next appt: 12/21     Incoming refill from [facility] - [facility] [address] [place], [place] [address]e [facility], [address] via interface     Medication requested:  Disp Refills Start End ADELAIDA    OLANZapine (ZYPREXA) 5 MG tablet 360 tablet 0 9/17/2021  No   Sig - Route: Take 1 tablet (5 mg) by mouth At Bedtime AND 1 tablet (5 mg) 3 times daily       From med dispense history:   Dispensed Days Supply Quantity Provider Pharmacy   OLANZAPINE  5 MG TABS 09/23/2021 90 360 tablet [facility]     Refill will be needed prior to next appointment date due to mail order lead time.     Refill routed to provider due to refill protocol (cancellation).

## 2021-11-22 NOTE — TELEPHONE ENCOUNTER
11/22/21 0813 Sly Demarco MD   E-Prescribing Status: Receipt confirmed by pharmacy (11/22/2021  8:13 AM CST)

## 2021-11-26 NOTE — TELEPHONE ENCOUNTER
"    Ana Laura was last seen 8/6/21 and Dr. Feldman's note from that visit says: \"stop nitrofurantoin after current prescription runs out\". Returned call to pt to review this. No answer; left message requesting call back.  TORITO Nolen RN  "

## 2021-11-26 NOTE — TELEPHONE ENCOUNTER
"Pt called back. Her understanding is that she's to stay on prophylactic antibiotic. Forwarded this question to Dr. Feldman via In Basket.  TORITO Nolen RN      Ana Laura was last seen 8/6/21 and Dr. Feldman's note from that visit says: \"stop nitrofurantoin after current prescription runs out\". Returned call to pt to review this. No answer; left message requesting call back.  TORITO Nolen RN      [facility] Call Center    Phone Message    May a detailed message be left on voicemail: yes     Reason for Call: Medication Refill Request    Has the patient contacted the pharmacy for the refill? Yes   Name of medication being requested: nitroFURantoin macrocrystal-monohydrate (MACROBID) 100 MG capsule  Provider who prescribed the medication:   Pharmacy: [id number] [address] [facility]   Date medication is needed: asap         Action Taken: Message routed to:  Other: uro    Travel Screening: Not Applicable                                                                      "

## 2021-11-30 NOTE — TELEPHONE ENCOUNTER
"Called Ana Laura with this direction from Dr. Feldman. She expressed understanding.  TORITO Nolen RN      ----- Message from Lewis Feldman MD sent at 11/30/2021  1:02 PM CST -----  Regarding: RE: refill request  Don't refill  Have her see Prince first    ----- Message -----  From: Luzma Nolen RN  Sent: 11/30/2021  11:36 AM CST  To: Lewis Feldman MD  Subject: refill request                                   Ana Laura Atkins was last seen 8/6 for recurrent UTIs. She has appt scheduled with Dr. Morrison 12/8. Currently doing ISC 4x/day and on daily nitrofurantoin.   She calls today requesting refill of the antibiotic but your note says: \"stop nitrofurantoin after current prescription runs out\". Pt states she has 27 pills left and is requesting 90 day Rx for refill. Please advise.  Thank you,  Luzma"

## 2021-11-30 NOTE — TELEPHONE ENCOUNTER
"Urology pt of Dr. Feldman last seen 8/6/2021. Ana Laura is being referred to Dr. Morrison and has an apt scheduled for 12/8/21.  Pt is currently doing ISC and on daily nitrofurantoin. She calls today requesting refill of the antibiotic and reports she has 27 pills left. Dr. Feldman's most recent note from 8/6 states: \"stop nitrofurantoin after current prescription runs out\". Pt requests 90 day Rx for refill. Will forward this request to provider.   "

## 2021-12-08 NOTE — PATIENT INSTRUCTIONS
Websites with free information:    [employer] patient website: www.voicesforpfd.org    Total Control Program: www.totalcontrolprogram.com    Supplements to prevent UTI to consider  -Probiotics  -Cranberry (for these products let them know a doctor is recommending them)   Ellura: www.myellura.Statusly   Theracran HP by [employer] [facility] [address]  -d-mannose 2gm daily  -Vitamin C 500-1000mg twice a day    It was a pleasure meeting with you today.  Thank you for allowing me and my team the privilege of caring for you today.  YOU are the reason we are here, and I truly hope we provided you with the excellent service you deserve.  Please let us know if there is anything else we can do for you so that we can be sure you are leaving completely satisfied with your care experience.

## 2021-12-08 NOTE — PROGRESS NOTES
"  Assessment & Plan     Recurrent UTI.\/History of UTI  Stable on nitrofurantoin so will plan on continuing.  She also asks to check for ureaplasma/mycoplasma    - Ureaplasma and Mycoplasma Culture    - nitroFURantoin macrocrystal-monohydrate (MACROBID) 100 MG capsule; Take 1 capsule (100 mg) by mouth daily Take one pill bid x 7days   Then decrease to one daily  until follow up appointment    Incomplete bladder emptying  Stable. Discussed options of continue to ISC versus sacral neuromodulation.  She reports that she has not issues with catheterization and would like to continue    Return in about 1 year (around 12/8/2022).    15 minutes were spent today in reviewing the EMR including Dr Feldman's notes, direct patient care and coordination of care including ordering urine test and prophylactic antibiotic    Luzma Morrison MD MPH  (she/her/hers)   of Urology  [facility]      Arthur Du is a 51 year old who presents for the following health issues     HPI     Had bladder surgery at 5 years of age and has had UTIs all her life.  Someone finally noted that she wasn't emptying her bladder and has now been catheterizing 4x a day.  Also on a nitrofurantoin prophylaxis and doing well.  She was referred to discuss sacral neuromodulation with me    History of alcohol abuse, has been dry for 1 year        Objective    /62   Pulse 89   Ht 1.676 m (5' 6\")   Wt 56.7 kg (125 lb)   SpO2 97%   BMI 20.18 kg/m    Body mass index is 20.18 kg/m .  Physical Exam   GENERAL: healthy, alert and no distress  EYES: Eyes grossly normal to inspection, conjunctivae and sclerae normal  HENT: normal cephalic/atraumatic.  External ears, nose and mouth without ulcers or lesions.  RESP: no audible wheeze, cough, or visible cyanosis.  No visible retractions or increased work of breathing.  Able to speak fully in complete sentences.  NEURO: Cranial nerves grossly intact, mentation intact and speech " normal  PSYCH: mentation appears normal, affect normal/bright, judgement and insight intact, normal speech and appearance well-groomed    CC  Patient Care Team:  Liborio Lincoln MD as PCP - General (Internal Medicine)  Jaden Rodriguez MD as MD (Psychiatry)  Self, Referred, MD as Liborio Penn MD as Assigned PCP  Celso Mcwilliams MD as Assigned OBGYN Provider  Mary Jane Rivera PA-C as Physician Assistant (Urology)  Larissa Batista CNP as Nurse Practitioner (Urology)  Lewis Feldman MD as Assigned Surgical Provider  Pratibha Fox [facility] as Pharmacist (Pharmacist)  Jaden Rodriguez MD as Assigned Behavioral Health Provider  SELF, REFERRED

## 2021-12-08 NOTE — LETTER
"12/8/2021       RE: Ana Laura Wharton  [address]  [place] [address]     Dear Colleague,    Thank you for referring your patient, Ana Laura Wharton, to the [facility] at [facility]. Please see a copy of my visit note below.      Assessment & Plan     Recurrent UTI.\/History of UTI  Stable on nitrofurantoin so will plan on continuing.  She also asks to check for ureaplasma/mycoplasma    - Ureaplasma and Mycoplasma Culture    - nitroFURantoin macrocrystal-monohydrate (MACROBID) 100 MG capsule; Take 1 capsule (100 mg) by mouth daily Take one pill bid x 7days   Then decrease to one daily  until follow up appointment    Incomplete bladder emptying  Stable. Discussed options of continue to ISC versus sacral neuromodulation.  She reports that she has not issues with catheterization and would like to continue    Return in about 1 year (around 12/8/2022).    15 minutes were spent today in reviewing the EMR including Dr Feldman's notes, direct patient care and coordination of care including ordering urine test and prophylactic antibiotic    Luzma Morrison MD MPH  (she/her/hers)   of Urology  [facility]      Subjective   Ana Laura is a 51 year old who presents for the following health issues     HPI     Had bladder surgery at 5 years of age and has had UTIs all her life.  Someone finally noted that she wasn't emptying her bladder and has now been catheterizing 4x a day.  Also on a nitrofurantoin prophylaxis and doing well.  She was referred to discuss sacral neuromodulation with me    History of alcohol abuse, has been dry for 1 year        Objective    /62   Pulse 89   Ht 1.676 m (5' 6\")   Wt 56.7 kg (125 lb)   SpO2 97%   BMI 20.18 kg/m    Body mass index is 20.18 kg/m .  Physical Exam   GENERAL: healthy, alert and no distress  EYES: Eyes grossly normal to inspection, conjunctivae and sclerae " normal  HENT: normal cephalic/atraumatic.  External ears, nose and mouth without ulcers or lesions.  RESP: no audible wheeze, cough, or visible cyanosis.  No visible retractions or increased work of breathing.  Able to speak fully in complete sentences.  NEURO: Cranial nerves grossly intact, mentation intact and speech normal  PSYCH: mentation appears normal, affect normal/bright, judgement and insight intact, normal speech and appearance well-groomed    CC  Patient Care Team:  Liborio Lincoln MD as PCP - General (Internal Medicine)  Jaden Rodriguez MD as MD (Psychiatry)  Self, Referred, MD as Liborio Penn MD as Assigned PCP  Celso Mcwilliams MD as Assigned OBGYN Provider  Mary Jane Rivera PA-C as Physician Assistant (Urology)  Larissa Batista CNP as Nurse Practitioner (Urology)  Lewis Feldman MD as Assigned Surgical Provider  Pratibha Fox [facility] as Pharmacist (Pharmacist)  Jaden Rodriguez MD as Assigned Behavioral Health Provider  SELF, REFERRED

## 2021-12-08 NOTE — NURSING NOTE
"Chief Complaint   Patient presents with     Recurrent UTI     Patient here today because she want refill on medication of  Macrobid       Blood pressure 110/62, pulse 89, height 1.676 m (5' 6\"), weight 56.7 kg (125 lb), SpO2 97 %, not currently breastfeeding. Body mass index is 20.18 kg/m .    Patient Active Problem List   Diagnosis     Anxiety disorder     Suicide ideation     Bipolar disorder current episode depressed (H)     Overdose     Depression     Mood disorder (H)     Suicidal ideations     Bipolar I disorder (H)     Acute renal failure (H)     Alternating exotropia     Anxiety state     Other bipolar disorders     Personality disorder (H)     Tear film insufficiency     Dysfunctions associated with sleep stages or arousal from sleep     Abnormal finding of blood chemistry     Esophageal reflux     Other specified hypothyroidism     Impulse control disorder     Acidosis     Low back pain     Myopia     Opioid dependence (H)     Orofacial dyskinesia     Other migraine without status migrainosus, not intractable     Pure hypercholesterolemia     Poisoning by 4-aminophenol derivatives, undetermined intent     Essential hypertension     Left knee pain     Aftercare following surgery of the musculoskeletal system     UTI (urinary tract infection)     Acute cystitis     Clostridium difficile colitis     Drug overdose     Calcium channel blocker overdose     Intentional acetaminophen overdose (H)     Alcoholic intoxication with complication (H)     Hypotension due to medication     Acute renal failure, unspecified acute renal failure type (H)     Paroxysmal atrial fibrillation (H)     Acute respiratory failure with hypoxia (H)     Acute pulmonary edema (H)     Spontaneous pneumothorax     PRES (posterior reversible encephalopathy syndrome)     Pancreatitis     Atypical squamous cells cannot exclude high grade squamous intraepithelial lesion on cytologic smear of cervix (ASC-H)     Alcohol withdrawal syndrome " "without complication (H)       Allergies   Allergen Reactions     Ciprofloxacin Other (See Comments)     Joint pain cdiff     Compazine [Prochlorperazine] Other (See Comments)     dystonia     Metoclopramide Other (See Comments)     \"I feel like I am crawling out of my skin\"     Reglan [Metoclopramide Hcl] Other (See Comments)     Sensation of \"crawling out of skin\"     Restoril [Temazepam]      Thorazine [Chlorpromazine] Other (See Comments)     dystonia     Abilify [Aripiprazole] Rash     Lamotrigine Rash     Severe drug rash - contraindication to receiving again. Skin peeling.        Current Outpatient Medications   Medication Sig Dispense Refill     acetaminophen (TYLENOL) 500 MG tablet Take 1-2 tablets (500-1,000 mg) by mouth every 6 hours as needed for mild pain       albuterol (PROAIR HFA/PROVENTIL HFA/VENTOLIN HFA) 108 (90 Base) MCG/ACT inhaler Inhale 2 puffs into the lungs every 6 hours as needed for shortness of breath / dyspnea or wheezing 1 Inhaler 0     atorvastatin (LIPITOR) 10 MG tablet TAKE 1 TABLET BY MOUTH  DAILY 90 tablet 1     carvedilol (COREG) 12.5 MG tablet TAKE 1 TABLET BY MOUTH  TWICE DAILY WITH MEALS 180 tablet 1     clindamycin (CLEOCIN T) 1 % external lotion Apply topically daily 60 mL 3     clobetasol (TEMOVATE) 0.05 % external cream Apply topically 2 times daily 60 g 1     clonazePAM (KLONOPIN) 1 MG tablet Take 1 tablet (1 mg) by mouth 2 times daily AND 2 tablets (2 mg) At Bedtime. 360 tablet 1     cyclobenzaprine (FLEXERIL) 10 MG tablet TAKE 1 TABLET BY MOUTH AT  BEDTIME 90 tablet 1     Emollient (HYDROPHOR) OINT Externally apply topically daily 454 g 3     folic acid (FOLVITE) 400 MCG tablet Take 400 mcg by mouth daily       hydrOXYzine (VISTARIL) 50 MG capsule Take 1 capsule (50 mg) by mouth 2 times daily. May also take 1 capsule (50 mg) daily as needed for anxiety. 270 capsule 1     ketorolac (TORADOL) 30 MG/ML injection every 7 days       levothyroxine (SYNTHROID/LEVOTHROID) 50 MCG " "tablet TAKE 1 TABLET BY MOUTH  DAILY 90 tablet 1     mirtazapine (REMERON) 30 MG tablet Take 2 tablets (60 mg) by mouth At Bedtime 60 tablet 1     Multiple Vitamins-Minerals (WOMENS MULTI VITAMIN & MINERAL PO) Take 1 tablet by mouth daily       nitroFURantoin macrocrystal-monohydrate (MACROBID) 100 MG capsule Take 1 capsule (100 mg) by mouth See Admin Instructions Take 100 mg twice a day for one week, Then take 100 mg once daily 97 capsule 0     norethindrone (MICRONOR) 0.35 MG tablet Take 1 tablet (0.35 mg) by mouth daily continuously 90 tablet 4     OLANZapine (ZYPREXA) 20 MG tablet Take 1 tablet (20 mg) by mouth At Bedtime along with one 5 mg tablet for total bedtime dose of 25 mg 90 tablet 0     OLANZapine (ZYPREXA) 5 MG tablet Take 1 tablet (5 mg) by mouth At Bedtime AND 1 tablet (5 mg) 3 times daily. 360 tablet 0     ondansetron (ZOFRAN) 4 MG tablet Take 1 tablet (4 mg) by mouth every 8 hours as needed for nausea 30 tablet 0     pantoprazole (PROTONIX) 40 MG EC tablet TAKE 1 TABLET BY MOUTH  TWICE DAILY 180 tablet 1     polyethylene glycol (MIRALAX) 17 g packet Take 1 packet by mouth daily as needed for constipation       Sennosides-Docusate Sodium (SENNA S PO)        SUMAtriptan (IMITREX) 25 MG tablet Take 25 mg by mouth at onset of headache for migraine       Syringe/Needle, Disp, (SYRINGE LUER LOCK) 25G X 1\" 3 ML MISC 1 Act once a week 10 each 0     Emollient (HYDROPHOR EX) Externally apply topically At Bedtime       nitroFURantoin macrocrystal-monohydrate (MACROBID) 100 MG capsule Take 1 capsule (100 mg) by mouth 2 times daily Take one pill bid x 7days   Then decrease to one daily  until follow up appointment 90 capsule 0     OLANZapine (ZYPREXA) 10 MG tablet Take 1 tablet (10 mg) by mouth At Bedtime (Patient taking differently: Take 20 mg by mouth At Bedtime ) 90 tablet 0     sodium chloride (OCEAN) 0.65 % nasal spray Use in both nostrils 3 times a day. 30 mL 1     sulfamethoxazole-trimethoprim (BACTRIM " DS) 800-160 MG tablet Take 1 tablet by mouth 2 times daily 14 tablet 0     tadalafil (CIALIS) 20 MG tablet Take 1 tablet (20 mg) by mouth daily as needed (sexual dysfunction) 90 tablet 0       Social History     Tobacco Use     Smoking status: Current Every Day Smoker     Packs/day: 1.00     Types: Cigarettes     Smokeless tobacco: Never Used     Tobacco comment: pt states she is quiting and declined resources   Substance Use Topics     Alcohol use: Not Currently     Alcohol/week: 2.0 standard drinks     Types: 2 Glasses of wine per week     Comment: None     Drug use: Yes     Frequency: 0.5 times per week     Types: Marijuana       UA RESULTS:  Recent Labs   Lab Test 12/08/21  1255 06/01/21  0929 05/14/21  1130   COLOR Yellow   < > Yellow   APPEARANCE Clear   < > Clear   URINEGLC Negative   < > Negative   URINEBILI Negative   < > Negative   URINEKETONE Negative   < > Negative   SG <=1.005   < > 1.015   UBLD Trace*   < > Negative   URINEPH 5.5   < > 7.0   PROTEIN Negative   < > Negative   UROBILINOGEN 0.2   < > 0.2   NITRITE Negative   < > Negative   LEUKEST Negative   < > Small*   RBCU  --   --  O - 2   WBCU  --   --  0 - 5    < > = values in this interval not displayed.     Patient wanted a new order for Pre-Lubricated 14 FR Catheter,Sterile  Female 6 Straight, Hey were already filled. No new order to to be filled until July of 2022 Please call [facility] @ [phone]     ASHLEY Brooks  12/8/2021  1:12 PM

## 2021-12-14 NOTE — TELEPHONE ENCOUNTER
[facility] Call Center    Phone Message    May a detailed message be left on voicemail: yes     Reason for Call: Medication Refill Request    Has the patient contacted the pharmacy for the refill? Yes   Name of medication being requested: Clonazepam   Provider who prescribed the medication: Dr. Andrews  Pharmacy:  [facility] - [facility] [address] [place] [address], [address]  Date medication is needed: Pt is out on 12/28. Patient reports multiple attempts by [facility] to reach us but have not heard back so patient is calling. She would like a call back for confirmation when it is sent         Action Taken: Message routed to:  Other: P [facility] [email or web address]    Travel Screening: Not Applicable

## 2021-12-14 NOTE — TELEPHONE ENCOUNTER
Last seen: 8/24  RTC: November with Dr. Andrews  Non-provider cancel: 11/2 Work/School Conflict  No-show: None  Next appt: 12/21     Incoming refill from patient via telephone     Medication requested:  Disp Refills Start End ADELAIDA    clonazePAM (KLONOPIN) 1 MG tablet 360 tablet 1 6/28/2021  No   Sig - Route: Take 1 tablet (1 mg) by mouth 2 times daily AND 2 tablets (2 mg) At Bedtime   Last refilled: 9/24, 7/5 (6/28 orders) per [facility]      Refill routed to provider due to refill protocol (cancellation).          12/14/21 1022 Sign Darryl, Sly Jones MD   E-Prescribing Status: Receipt confirmed by pharmacy (12/14/2021 10:22 AM CST)    Writer left voice mail message for pt identifying that the refill was sent.

## 2021-12-17 NOTE — TELEPHONE ENCOUNTER
Refill denied  Too soon  script sent 11/2/21 #60-1 refill   next appt 12/21/21   Palliative care encounter Palliative care encounter Palliative care encounter Palliative care encounter Palliative care encounter Palliative care encounter Palliative care encounter Palliative care encounter Palliative care encounter
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
## 2021-12-21 NOTE — PROGRESS NOTES
"VIDEO VISIT  Ana Laura Wharton is a 51 year old patient that has consented to receive services via billable video visit.      The patient has been notified of following:   \"This video visit will be conducted via a call between you and your physician/provider. We have found that certain health care needs can be provided without the need for an in-person physical exam. This service lets us provide the care you need with a video conversation. If a prescription is necessary we can send it directly to your pharmacy. If lab work is needed we can place an order for that and you can then stop by our lab to have the test done at a later time. Insurers are generally covering virtual visits as they would in-office visits so billing should not be different than normal.  If for some reason you do get billed incorrectly, you should contact the billing office to correct it and that number is in the AVS .    Patient will join video visit via:  email invite was sent ([employer] is preferred, but patient is unable to join via [employer])    If patient attempts to join the video via [employer] at appointment start time, but is unable to, they would prefer that the provider send them a video invitation via:   Send to preferred e-mail: [email or web address]      How would patient like to obtain AVS?:  MyChart    "

## 2021-12-21 NOTE — PATIENT INSTRUCTIONS
Continue your medications at the current doses    Please return for follow-up on March 1      **For crisis resources, please see the information at the end of this document**   Patient Education    Thank you for coming to the [facility] [place] [facility].    Lab Testing:  If you had lab testing today and your results are reassuring or normal they will be mailed to you or sent through [employer] within 7 days. If the lab tests need quick action we will call you with the results. The phone number we will call with results is # [phone] (home) . If this is not the best number please call our clinic and change the number.    Medication Refills:  If you need any refills please call your pharmacy and they will contact us. Our fax number for refills is [phone]. Please allow three business for refill processing. If you need to  your refill at a new pharmacy, please contact the new pharmacy directly. The new pharmacy will help you get your medications transferred.     Scheduling:  If you have any concerns about today's visit or wish to schedule another appointment please call our office during normal business hours [phone] (8-5:00 M-F)    Contact Us:  Please call [phone] during business hours (8-5:00 M-F).  If after clinic hours, or on the weekend, please call  [phone].    Financial Assistance [phone]  [facility] Billing [phone]  Central Billing Office, ealth: [phone]  [facility] Billing [phone]  Medical Records [phone]  [facility] Patient Bill of Rights [email or web address]       MENTAL HEALTH CRISIS NUMBERS:  For a medical emergency please call  911 or go to the nearest ER.     [facility]:   [facility] -[phone]   Crisis Residence [facility] -[phone]   Walk-In Counseling Center [facility] -[phone]   COPE 24/7 [facility] Team  -[phone] (adults)/[phone] (child)  [facility]: [facility] needs [facility] team - [phone]      [facility]:   [facility] - [phone]   Walk-in counseling [facility] - [phone]   Walk-in counseling [facility] - [phone]   Crisis Residence [facility] - [phone]  Urgent Care Adult Mental [facility]-[phone] mobile unit/ 24/7 crisis line    National Crisis Numbers:   National Suicide Prevention Lifeline: 7-418-255-TALK (298-227-3569)  Poison Control Center - 1-365.198.5380  5o9/resources for a list of additional resources (SOS)  Trans Lifeline a hotline for transgender people 3-288-935-8759  [employer] a hotline for LGBT youth 0-894-870-2352  Crisis Text Line: For any crisis 24/7   To: 602764  see www.crisistextline.org  - IF MAKING A CALL FEELS TOO HARD, send a text!         Again thank you for choosing [facility] [place] [facility] and please let us know how we can best partner with you to improve you and your family's health.    You may be receiving a survey regarding this appointment. We would love to have your feedback, both positive and negative. The survey is done by an external company, so your answers are anonymous.

## 2021-12-21 NOTE — TELEPHONE ENCOUNTER
On December 21, 2021, at 12:37 PM, writer called patient at mobile to confirm Virtual Visit. Writer unable to make contact with patient. Writer left detailed voice message for call back. [phone] left as call back number. Gregory Bronson, EMT

## 2021-12-29 NOTE — TELEPHONE ENCOUNTER
Jaden Rodriguez MD Labossiere, Laura, RN   Caller: Unspecified (Yesterday, 10:28 AM)             60 mg        Called pt and confirmed that Dr. Rodriguez agrees that she should increase her Remeron. Agreed to call [facility] and confirm that they have refill on file.    Pt inquired about Zyprexa. She stated the provider increased her dose to 5 mg QAM and 25 mg at bedtime (2- 10 mg tabs + 5 mg tab). Writer agreed to adjust current Rx for 5 mg tabs to reflect the above instructions.   Assistance with ambulation/Assistance OOB with selected safe patient handling equipment/Communicate Risk of Fall with Harm to all staff/Discuss with provider need for PT consult/Monitor gait and stability/Provide patient with walking aids - walker, cane, crutches/Reinforce activity limits and safety measures with patient and family/Tailored Fall Risk Interventions/Visual Cue: Yellow wristband and red socks/Bed in lowest position, wheels locked, appropriate side rails in place/Call bell, personal items and telephone in reach/Instruct patient to call for assistance before getting out of bed or chair/Non-slip footwear when patient is out of bed/[place] to call system/Physically safe environment - no spills, clutter or unnecessary equipment/Purposeful Proactive Rounding/Room/bathroom lighting operational, light cord in reach

## 2022-01-01 ENCOUNTER — MEDICAL CORRESPONDENCE (OUTPATIENT)
Dept: HEALTH INFORMATION MANAGEMENT | Facility: CLINIC | Age: 52
End: 2022-01-01
Payer: COMMERCIAL

## 2022-01-01 ENCOUNTER — HOSPITAL ENCOUNTER (EMERGENCY)
Facility: CLINIC | Age: 52
Discharge: HOME | End: 2022-04-25
Attending: FAMILY MEDICINE | Admitting: EMERGENCY MEDICINE
Payer: COMMERCIAL

## 2022-01-01 ENCOUNTER — PATIENT OUTREACH (OUTPATIENT)
Dept: OBGYN | Facility: CLINIC | Age: 52
End: 2022-01-01
Payer: COMMERCIAL

## 2022-01-01 ENCOUNTER — MYC MEDICAL ADVICE (OUTPATIENT)
Dept: PHARMACY | Facility: CLINIC | Age: 52
End: 2022-01-01

## 2022-01-01 ENCOUNTER — TELEPHONE (OUTPATIENT)
Dept: UROLOGY | Facility: CLINIC | Age: 52
End: 2022-01-01

## 2022-01-01 ENCOUNTER — E-VISIT (OUTPATIENT)
Dept: INTERNAL MEDICINE | Facility: CLINIC | Age: 52
End: 2022-01-01
Payer: COMMERCIAL

## 2022-01-01 ENCOUNTER — TELEPHONE (OUTPATIENT)
Dept: PSYCHIATRY | Facility: CLINIC | Age: 52
End: 2022-01-01
Payer: COMMERCIAL

## 2022-01-01 ENCOUNTER — HEALTH MAINTENANCE LETTER (OUTPATIENT)
Age: 52
End: 2022-01-01

## 2022-01-01 ENCOUNTER — TELEPHONE (OUTPATIENT)
Dept: BEHAVIORAL HEALTH | Facility: CLINIC | Age: 52
End: 2022-01-01

## 2022-01-01 ENCOUNTER — APPOINTMENT (OUTPATIENT)
Dept: GENERAL RADIOLOGY | Facility: CLINIC | Age: 52
End: 2022-01-01
Attending: EMERGENCY MEDICINE
Payer: COMMERCIAL

## 2022-01-01 ENCOUNTER — VIRTUAL VISIT (OUTPATIENT)
Dept: PSYCHIATRY | Facility: CLINIC | Age: 52
End: 2022-01-01
Attending: PSYCHIATRY & NEUROLOGY
Payer: COMMERCIAL

## 2022-01-01 ENCOUNTER — LAB (OUTPATIENT)
Dept: LAB | Facility: CLINIC | Age: 52
End: 2022-01-01
Payer: COMMERCIAL

## 2022-01-01 ENCOUNTER — TELEPHONE (OUTPATIENT)
Dept: INTERNAL MEDICINE | Facility: CLINIC | Age: 52
End: 2022-01-01
Payer: COMMERCIAL

## 2022-01-01 ENCOUNTER — NURSE TRIAGE (OUTPATIENT)
Dept: NURSING | Facility: CLINIC | Age: 52
End: 2022-01-01

## 2022-01-01 ENCOUNTER — HOSPITAL ENCOUNTER (EMERGENCY)
Facility: CLINIC | Age: 52
Discharge: HOME | End: 2022-10-29
Attending: EMERGENCY MEDICINE | Admitting: EMERGENCY MEDICINE
Payer: COMMERCIAL

## 2022-01-01 ENCOUNTER — PATIENT OUTREACH (OUTPATIENT)
Dept: CARE COORDINATION | Facility: CLINIC | Age: 52
End: 2022-01-01

## 2022-01-01 ENCOUNTER — TELEPHONE (OUTPATIENT)
Dept: NURSING | Facility: CLINIC | Age: 52
End: 2022-01-01

## 2022-01-01 ENCOUNTER — TELEPHONE (OUTPATIENT)
Dept: PSYCHIATRY | Facility: CLINIC | Age: 52
End: 2022-01-01

## 2022-01-01 ENCOUNTER — DOCUMENTATION ONLY (OUTPATIENT)
Dept: PSYCHIATRY | Facility: CLINIC | Age: 52
End: 2022-01-01
Payer: COMMERCIAL

## 2022-01-01 ENCOUNTER — VIRTUAL VISIT (OUTPATIENT)
Dept: PHARMACY | Facility: CLINIC | Age: 52
End: 2022-01-01
Payer: COMMERCIAL

## 2022-01-01 ENCOUNTER — LAB (OUTPATIENT)
Dept: LAB | Facility: CLINIC | Age: 52
End: 2022-01-01
Attending: INTERNAL MEDICINE
Payer: COMMERCIAL

## 2022-01-01 ENCOUNTER — TELEPHONE (OUTPATIENT)
Dept: OBGYN | Facility: CLINIC | Age: 52
End: 2022-01-01
Payer: COMMERCIAL

## 2022-01-01 ENCOUNTER — HOSPITAL ENCOUNTER (OUTPATIENT)
Dept: CT IMAGING | Facility: CLINIC | Age: 52
Discharge: HOME | End: 2022-01-18
Attending: INTERNAL MEDICINE | Admitting: INTERNAL MEDICINE
Payer: COMMERCIAL

## 2022-01-01 ENCOUNTER — OFFICE VISIT (OUTPATIENT)
Dept: OBGYN | Facility: CLINIC | Age: 52
End: 2022-01-01
Payer: COMMERCIAL

## 2022-01-01 ENCOUNTER — LAB (OUTPATIENT)
Dept: URGENT CARE | Facility: URGENT CARE | Age: 52
End: 2022-01-01
Attending: INTERNAL MEDICINE
Payer: COMMERCIAL

## 2022-01-01 ENCOUNTER — HOSPITAL ENCOUNTER (EMERGENCY)
Facility: CLINIC | Age: 52
End: 2022-11-09
Attending: EMERGENCY MEDICINE | Admitting: EMERGENCY MEDICINE
Payer: COMMERCIAL

## 2022-01-01 ENCOUNTER — TELEPHONE (OUTPATIENT)
Dept: UROLOGY | Facility: CLINIC | Age: 52
End: 2022-01-01
Payer: COMMERCIAL

## 2022-01-01 ENCOUNTER — TRANSFERRED RECORDS (OUTPATIENT)
Dept: HEALTH INFORMATION MANAGEMENT | Facility: CLINIC | Age: 52
End: 2022-01-01

## 2022-01-01 ENCOUNTER — HOSPITAL ENCOUNTER (INPATIENT)
Facility: CLINIC | Age: 52
LOS: 2 days | Discharge: HOME | DRG: 885 | End: 2022-10-26
Attending: EMERGENCY MEDICINE | Admitting: PSYCHIATRY & NEUROLOGY
Payer: COMMERCIAL

## 2022-01-01 ENCOUNTER — VIRTUAL VISIT (OUTPATIENT)
Dept: INTERNAL MEDICINE | Facility: CLINIC | Age: 52
End: 2022-01-01
Payer: COMMERCIAL

## 2022-01-01 ENCOUNTER — HOSPITAL ENCOUNTER (OUTPATIENT)
Dept: MAMMOGRAPHY | Facility: CLINIC | Age: 52
Discharge: HOME | End: 2022-01-31
Attending: INTERNAL MEDICINE | Admitting: INTERNAL MEDICINE
Payer: COMMERCIAL

## 2022-01-01 ENCOUNTER — TELEPHONE (OUTPATIENT)
Dept: OPHTHALMOLOGY | Facility: CLINIC | Age: 52
End: 2022-01-01
Payer: COMMERCIAL

## 2022-01-01 ENCOUNTER — HOSPITAL ENCOUNTER (OUTPATIENT)
Dept: ULTRASOUND IMAGING | Facility: CLINIC | Age: 52
End: 2022-01-03
Attending: INTERNAL MEDICINE
Payer: COMMERCIAL

## 2022-01-01 ENCOUNTER — TRANSFERRED RECORDS (OUTPATIENT)
Dept: HEALTH INFORMATION MANAGEMENT | Facility: CLINIC | Age: 52
End: 2022-01-01
Payer: COMMERCIAL

## 2022-01-01 ENCOUNTER — OFFICE VISIT (OUTPATIENT)
Dept: INTERNAL MEDICINE | Facility: CLINIC | Age: 52
End: 2022-01-01
Payer: COMMERCIAL

## 2022-01-01 ENCOUNTER — MYC MEDICAL ADVICE (OUTPATIENT)
Dept: PSYCHIATRY | Facility: CLINIC | Age: 52
End: 2022-01-01

## 2022-01-01 ENCOUNTER — ANCILLARY PROCEDURE (OUTPATIENT)
Dept: GENERAL RADIOLOGY | Facility: CLINIC | Age: 52
End: 2022-01-01
Payer: COMMERCIAL

## 2022-01-01 ENCOUNTER — TELEPHONE (OUTPATIENT)
Facility: CLINIC | Age: 52
End: 2022-01-01
Payer: COMMERCIAL

## 2022-01-01 ENCOUNTER — TELEPHONE (OUTPATIENT)
Dept: INTERNAL MEDICINE | Facility: CLINIC | Age: 52
End: 2022-01-01

## 2022-01-01 ENCOUNTER — HOSPITAL ENCOUNTER (OUTPATIENT)
Dept: ULTRASOUND IMAGING | Facility: CLINIC | Age: 52
Discharge: HOME | End: 2022-08-29
Attending: INTERNAL MEDICINE | Admitting: INTERNAL MEDICINE
Payer: COMMERCIAL

## 2022-01-01 VITALS
RESPIRATION RATE: 16 BRPM | SYSTOLIC BLOOD PRESSURE: 127 MMHG | OXYGEN SATURATION: 98 % | WEIGHT: 129 LBS | HEIGHT: 66 IN | BODY MASS INDEX: 20.73 KG/M2 | TEMPERATURE: 97 F | HEART RATE: 81 BPM | DIASTOLIC BLOOD PRESSURE: 84 MMHG

## 2022-01-01 VITALS
HEIGHT: 66 IN | RESPIRATION RATE: 16 BRPM | BODY MASS INDEX: 20.73 KG/M2 | HEART RATE: 92 BPM | DIASTOLIC BLOOD PRESSURE: 78 MMHG | SYSTOLIC BLOOD PRESSURE: 118 MMHG | TEMPERATURE: 98 F | WEIGHT: 129 LBS | OXYGEN SATURATION: 98 %

## 2022-01-01 VITALS
OXYGEN SATURATION: 95 % | WEIGHT: 128.38 LBS | BODY MASS INDEX: 20.63 KG/M2 | TEMPERATURE: 98 F | HEART RATE: 94 BPM | SYSTOLIC BLOOD PRESSURE: 122 MMHG | HEIGHT: 66 IN | DIASTOLIC BLOOD PRESSURE: 85 MMHG

## 2022-01-01 VITALS — WEIGHT: 126 LBS | HEIGHT: 67 IN | BODY MASS INDEX: 19.78 KG/M2

## 2022-01-01 VITALS
TEMPERATURE: 97 F | OXYGEN SATURATION: 97 % | HEART RATE: 83 BPM | SYSTOLIC BLOOD PRESSURE: 140 MMHG | DIASTOLIC BLOOD PRESSURE: 96 MMHG | RESPIRATION RATE: 16 BRPM

## 2022-01-01 VITALS — WEIGHT: 130.7 LBS | BODY MASS INDEX: 21.1 KG/M2 | DIASTOLIC BLOOD PRESSURE: 60 MMHG | SYSTOLIC BLOOD PRESSURE: 110 MMHG

## 2022-01-01 VITALS — SYSTOLIC BLOOD PRESSURE: 110 MMHG | BODY MASS INDEX: 21.31 KG/M2 | WEIGHT: 132 LBS | DIASTOLIC BLOOD PRESSURE: 68 MMHG

## 2022-01-01 VITALS
OXYGEN SATURATION: 100 % | DIASTOLIC BLOOD PRESSURE: 82 MMHG | RESPIRATION RATE: 18 BRPM | SYSTOLIC BLOOD PRESSURE: 120 MMHG | TEMPERATURE: 98.3 F | HEART RATE: 127 BPM

## 2022-01-01 VITALS — DIASTOLIC BLOOD PRESSURE: 78 MMHG | BODY MASS INDEX: 21.06 KG/M2 | WEIGHT: 130.5 LBS | SYSTOLIC BLOOD PRESSURE: 114 MMHG

## 2022-01-01 VITALS — OXYGEN SATURATION: 99 %

## 2022-01-01 DIAGNOSIS — R07.0 THROAT PAIN: ICD-10-CM

## 2022-01-01 DIAGNOSIS — F10.21 ALCOHOL USE DISORDER, SEVERE, IN EARLY REMISSION, DEPENDENCE (H): ICD-10-CM

## 2022-01-01 DIAGNOSIS — Z30.011 OCP (ORAL CONTRACEPTIVE PILLS) INITIATION: ICD-10-CM

## 2022-01-01 DIAGNOSIS — J43.9 PULMONARY EMPHYSEMA, UNSPECIFIED EMPHYSEMA TYPE (H): ICD-10-CM

## 2022-01-01 DIAGNOSIS — K21.9 GASTROESOPHAGEAL REFLUX DISEASE WITHOUT ESOPHAGITIS: ICD-10-CM

## 2022-01-01 DIAGNOSIS — E78.5 HYPERLIPIDEMIA LDL GOAL <100: ICD-10-CM

## 2022-01-01 DIAGNOSIS — F41.9 ANXIETY DISORDER, UNSPECIFIED TYPE: ICD-10-CM

## 2022-01-01 DIAGNOSIS — J02.9 SORE THROAT: ICD-10-CM

## 2022-01-01 DIAGNOSIS — R33.9 URINE RETENTION: ICD-10-CM

## 2022-01-01 DIAGNOSIS — F31.9 BIPOLAR I DISORDER (H): ICD-10-CM

## 2022-01-01 DIAGNOSIS — F33.2 SEVERE EPISODE OF RECURRENT MAJOR DEPRESSIVE DISORDER, WITHOUT PSYCHOTIC FEATURES (H): ICD-10-CM

## 2022-01-01 DIAGNOSIS — F10.10 ALCOHOL USE DISORDER, MILD, ABUSE: ICD-10-CM

## 2022-01-01 DIAGNOSIS — R05.9 COUGH: ICD-10-CM

## 2022-01-01 DIAGNOSIS — Z00.00 NORMAL EXTERNAL GENITALIA EXAM: ICD-10-CM

## 2022-01-01 DIAGNOSIS — L73.9 FOLLICULITIS: Primary | ICD-10-CM

## 2022-01-01 DIAGNOSIS — E03.9 ACQUIRED HYPOTHYROIDISM: ICD-10-CM

## 2022-01-01 DIAGNOSIS — K59.00 CONSTIPATION, UNSPECIFIED CONSTIPATION TYPE: ICD-10-CM

## 2022-01-01 DIAGNOSIS — F41.9 ANXIETY: ICD-10-CM

## 2022-01-01 DIAGNOSIS — F10.920 ALCOHOLIC INTOXICATION WITHOUT COMPLICATION (H): ICD-10-CM

## 2022-01-01 DIAGNOSIS — D72.829 LEUKOCYTOSIS, UNSPECIFIED TYPE: Primary | ICD-10-CM

## 2022-01-01 DIAGNOSIS — Z79.899 MEDICATION MANAGEMENT: ICD-10-CM

## 2022-01-01 DIAGNOSIS — I10 ESSENTIAL HYPERTENSION: ICD-10-CM

## 2022-01-01 DIAGNOSIS — T83.511A URINARY TRACT INFECTION ASSOCIATED WITH CATHETERIZATION OF URINARY TRACT, UNSPECIFIED INDWELLING URINARY CATHETER TYPE, INITIAL ENCOUNTER (H): Primary | ICD-10-CM

## 2022-01-01 DIAGNOSIS — F10.21 ALCOHOL USE DISORDER, SEVERE, IN SUSTAINED REMISSION (H): ICD-10-CM

## 2022-01-01 DIAGNOSIS — F31.9 BIPOLAR I DISORDER (H): Primary | ICD-10-CM

## 2022-01-01 DIAGNOSIS — T50.902A OVERDOSE, INTENTIONAL SELF-HARM, INITIAL ENCOUNTER (H): ICD-10-CM

## 2022-01-01 DIAGNOSIS — R39.89 SUSPECTED UTI: ICD-10-CM

## 2022-01-01 DIAGNOSIS — F10.230 ALCOHOL DEPENDENCE WITH UNCOMPLICATED WITHDRAWAL (H): ICD-10-CM

## 2022-01-01 DIAGNOSIS — E78.5 HYPERLIPIDEMIA LDL GOAL <130: ICD-10-CM

## 2022-01-01 DIAGNOSIS — F31.32 BIPOLAR 1 DISORDER, DEPRESSED, MODERATE (H): Primary | ICD-10-CM

## 2022-01-01 DIAGNOSIS — Z00.00 ENCOUNTER FOR PREVENTATIVE ADULT HEALTH CARE EXAMINATION: Primary | ICD-10-CM

## 2022-01-01 DIAGNOSIS — N39.0 URINARY TRACT INFECTION ASSOCIATED WITH CATHETERIZATION OF URINARY TRACT, UNSPECIFIED INDWELLING URINARY CATHETER TYPE, INITIAL ENCOUNTER (H): Primary | ICD-10-CM

## 2022-01-01 DIAGNOSIS — M62.830 BACK MUSCLE SPASM: ICD-10-CM

## 2022-01-01 DIAGNOSIS — R63.5 WEIGHT GAIN: Primary | ICD-10-CM

## 2022-01-01 DIAGNOSIS — R53.83 FATIGUE, UNSPECIFIED TYPE: ICD-10-CM

## 2022-01-01 DIAGNOSIS — Z79.899 ENCOUNTER FOR MEDICATION MANAGEMENT: Primary | ICD-10-CM

## 2022-01-01 DIAGNOSIS — F41.9 ANXIETY DISORDER, UNSPECIFIED TYPE: Chronic | ICD-10-CM

## 2022-01-01 DIAGNOSIS — K76.9 CHRONIC LIVER DISEASE: ICD-10-CM

## 2022-01-01 DIAGNOSIS — F31.4 BIPOLAR DISORDER, CURRENT EPISODE DEPRESSED, SEVERE, WITHOUT PSYCHOTIC FEATURES (H): Primary | ICD-10-CM

## 2022-01-01 DIAGNOSIS — R00.2 PALPITATIONS: ICD-10-CM

## 2022-01-01 DIAGNOSIS — I46.9 CARDIAC ARREST (H): ICD-10-CM

## 2022-01-01 DIAGNOSIS — I10 BENIGN ESSENTIAL HYPERTENSION: ICD-10-CM

## 2022-01-01 DIAGNOSIS — R05.3 CHRONIC COUGH: Primary | ICD-10-CM

## 2022-01-01 DIAGNOSIS — R09.89 PHLEGM IN THROAT: ICD-10-CM

## 2022-01-01 DIAGNOSIS — R35.0 URINARY FREQUENCY: ICD-10-CM

## 2022-01-01 DIAGNOSIS — R19.7 DIARRHEA OF PRESUMED INFECTIOUS ORIGIN: ICD-10-CM

## 2022-01-01 DIAGNOSIS — K70.30 ALCOHOLIC CIRRHOSIS OF LIVER WITHOUT ASCITES (H): ICD-10-CM

## 2022-01-01 DIAGNOSIS — N39.0 RECURRENT UTI: ICD-10-CM

## 2022-01-01 DIAGNOSIS — Z78.9 TAKES DIETARY SUPPLEMENTS: ICD-10-CM

## 2022-01-01 DIAGNOSIS — F41.9 ANXIETY DISORDER, UNSPECIFIED TYPE: Primary | ICD-10-CM

## 2022-01-01 DIAGNOSIS — Z87.2 HISTORY OF FOLLICULITIS: ICD-10-CM

## 2022-01-01 DIAGNOSIS — N30.00 ACUTE CYSTITIS WITHOUT HEMATURIA: ICD-10-CM

## 2022-01-01 DIAGNOSIS — R19.7 DIARRHEA OF PRESUMED INFECTIOUS ORIGIN: Primary | ICD-10-CM

## 2022-01-01 DIAGNOSIS — H53.8 BLURRED VISION: Primary | ICD-10-CM

## 2022-01-01 DIAGNOSIS — K70.9 LIVER DISEASE, CHRONIC, DUE TO ALCOHOL (H): ICD-10-CM

## 2022-01-01 DIAGNOSIS — F33.2 SEVERE RECURRENT MAJOR DEPRESSION WITHOUT PSYCHOTIC FEATURES (H): ICD-10-CM

## 2022-01-01 DIAGNOSIS — Z20.822 CONTACT WITH AND (SUSPECTED) EXPOSURE TO COVID-19: ICD-10-CM

## 2022-01-01 DIAGNOSIS — N89.8 HYMENAL REMNANT: Primary | ICD-10-CM

## 2022-01-01 DIAGNOSIS — R39.89 SUSPECTED UTI: Primary | ICD-10-CM

## 2022-01-01 DIAGNOSIS — R22.31 SKIN LUMP OF ARM, RIGHT: ICD-10-CM

## 2022-01-01 DIAGNOSIS — R53.83 FATIGUE, UNSPECIFIED TYPE: Primary | ICD-10-CM

## 2022-01-01 DIAGNOSIS — Z12.31 VISIT FOR SCREENING MAMMOGRAM: ICD-10-CM

## 2022-01-01 DIAGNOSIS — N39.0 URINARY TRACT INFECTION: Primary | ICD-10-CM

## 2022-01-01 DIAGNOSIS — K59.00 CONSTIPATION: ICD-10-CM

## 2022-01-01 DIAGNOSIS — Z30.9 CONTRACEPTIVE MANAGEMENT: ICD-10-CM

## 2022-01-01 DIAGNOSIS — N39.0 UTI (URINARY TRACT INFECTION): ICD-10-CM

## 2022-01-01 DIAGNOSIS — J02.9 SORE THROAT: Primary | ICD-10-CM

## 2022-01-01 DIAGNOSIS — R63.5 WEIGHT GAIN: ICD-10-CM

## 2022-01-01 DIAGNOSIS — Z11.3 SCREEN FOR STD (SEXUALLY TRANSMITTED DISEASE): ICD-10-CM

## 2022-01-01 DIAGNOSIS — N39.0 RECURRENT UTI: Primary | ICD-10-CM

## 2022-01-01 DIAGNOSIS — Z51.81 ENCOUNTER FOR THERAPEUTIC DRUG MONITORING: Primary | ICD-10-CM

## 2022-01-01 DIAGNOSIS — E03.9 HYPOTHYROIDISM: ICD-10-CM

## 2022-01-01 DIAGNOSIS — R05.3 CHRONIC COUGH: ICD-10-CM

## 2022-01-01 DIAGNOSIS — N87.0 DYSPLASIA OF CERVIX, LOW GRADE (CIN 1): Primary | ICD-10-CM

## 2022-01-01 LAB
AFP SERPL-MCNC: 3.3 UG/L (ref 0–8)
AFP SERPL-MCNC: 5.3 NG/ML
ALBUMIN SERPL BCG-MCNC: 4.5 G/DL (ref 3.5–5.2)
ALBUMIN SERPL-MCNC: 3.5 G/DL (ref 3.4–5)
ALBUMIN SERPL-MCNC: 4.1 G/DL (ref 3.4–5)
ALBUMIN SERPL-MCNC: 4.4 G/DL (ref 3.4–5)
ALBUMIN UR-MCNC: 20 MG/DL
ALBUMIN UR-MCNC: NEGATIVE MG/DL
ALCOHOL BREATH TEST: 0 (ref 0–0.01)
ALP SERPL-CCNC: 130 U/L (ref 40–150)
ALP SERPL-CCNC: 153 U/L (ref 35–104)
ALP SERPL-CCNC: 158 U/L (ref 40–150)
ALP SERPL-CCNC: 188 U/L (ref 40–150)
ALT SERPL W P-5'-P-CCNC: 26 U/L (ref 10–35)
ALT SERPL W P-5'-P-CCNC: 35 U/L (ref 0–50)
ALT SERPL W P-5'-P-CCNC: 38 U/L (ref 0–50)
ALT SERPL W P-5'-P-CCNC: 46 U/L (ref 0–50)
AMPHETAMINES UR QL SCN: NORMAL
ANION GAP SERPL CALCULATED.3IONS-SCNC: 9 MMOL/L (ref 3–14)
APPEARANCE UR: CLEAR
AST SERPL W P-5'-P-CCNC: 43 U/L (ref 0–45)
AST SERPL W P-5'-P-CCNC: 45 U/L (ref 10–35)
AST SERPL W P-5'-P-CCNC: 57 U/L (ref 0–45)
AST SERPL W P-5'-P-CCNC: 81 U/L (ref 0–45)
BACTERIA #/AREA URNS HPF: ABNORMAL /HPF
BACTERIA #/AREA URNS HPF: ABNORMAL /HPF
BACTERIA UR CULT: ABNORMAL
BACTERIA UR CULT: NO GROWTH
BACTERIA UR CULT: NORMAL
BARBITURATES UR QL: NORMAL
BASOPHILS # BLD AUTO: 0 10E3/UL (ref 0–0.2)
BASOPHILS # BLD AUTO: 0.1 10E3/UL (ref 0–0.2)
BASOPHILS # BLD AUTO: 0.1 10E3/UL (ref 0–0.2)
BASOPHILS NFR BLD AUTO: 0 %
BASOPHILS NFR BLD AUTO: 1 %
BASOPHILS NFR BLD AUTO: 1 %
BENZODIAZ UR QL: NORMAL
BILIRUB DIRECT SERPL-MCNC: 0.2 MG/DL (ref 0–0.2)
BILIRUB DIRECT SERPL-MCNC: 0.2 MG/DL (ref 0–0.2)
BILIRUB DIRECT SERPL-MCNC: <0.2 MG/DL (ref 0–0.3)
BILIRUB SERPL-MCNC: 0.4 MG/DL
BILIRUB SERPL-MCNC: 0.4 MG/DL (ref 0.2–1.3)
BILIRUB SERPL-MCNC: 0.5 MG/DL (ref 0.2–1.3)
BILIRUB SERPL-MCNC: 0.9 MG/DL (ref 0.2–1.3)
BILIRUB UR QL STRIP: NEGATIVE
BKR LAB AP GYN ADEQUACY: ABNORMAL
BKR LAB AP GYN INTERPRETATION: ABNORMAL
BKR LAB AP HPV REFLEX: ABNORMAL
BKR LAB AP PREVIOUS ABNORMAL: ABNORMAL
BUN SERPL-MCNC: 9 MG/DL (ref 7–30)
C TRACH DNA SPEC QL NAA+PROBE: NEGATIVE
C TRACH DNA SPEC QL NAA+PROBE: NEGATIVE
CALCIUM SERPL-MCNC: 9.1 MG/DL (ref 8.5–10.1)
CANNABINOIDS UR QL SCN: NORMAL
CHLORIDE BLD-SCNC: 106 MMOL/L (ref 94–109)
CHOLEST SERPL-MCNC: 163 MG/DL
CHOLEST SERPL-MCNC: 179 MG/DL
CLUE CELLS: ABNORMAL
CO2 SERPL-SCNC: 24 MMOL/L (ref 20–32)
COCAINE UR QL: NORMAL
COLOR UR AUTO: ABNORMAL
COLOR UR AUTO: ABNORMAL
COLOR UR AUTO: NORMAL
COLOR UR AUTO: YELLOW
CREAT SERPL-MCNC: 0.56 MG/DL (ref 0.52–1.04)
CREAT SERPL-MCNC: 0.75 MG/DL (ref 0.52–1.04)
CREAT SERPL-MCNC: 0.78 MG/DL (ref 0.51–0.95)
DEPRECATED S PYO AG THROAT QL EIA: NEGATIVE
EOSINOPHIL # BLD AUTO: 0.2 10E3/UL (ref 0–0.7)
EOSINOPHIL # BLD AUTO: 0.2 10E3/UL (ref 0–0.7)
EOSINOPHIL # BLD AUTO: 0.3 10E3/UL (ref 0–0.7)
EOSINOPHIL NFR BLD AUTO: 1 %
EOSINOPHIL NFR BLD AUTO: 3 %
EOSINOPHIL NFR BLD AUTO: 3 %
ERYTHROCYTE [DISTWIDTH] IN BLOOD BY AUTOMATED COUNT: 13.2 % (ref 10–15)
ERYTHROCYTE [DISTWIDTH] IN BLOOD BY AUTOMATED COUNT: 13.4 % (ref 10–15)
ERYTHROCYTE [DISTWIDTH] IN BLOOD BY AUTOMATED COUNT: 14 % (ref 10–15)
ERYTHROCYTE [DISTWIDTH] IN BLOOD BY AUTOMATED COUNT: 15.5 % (ref 10–15)
ERYTHROCYTE [DISTWIDTH] IN BLOOD BY AUTOMATED COUNT: 15.9 % (ref 10–15)
ERYTHROCYTE [DISTWIDTH] IN BLOOD BY AUTOMATED COUNT: 16.2 % (ref 10–15)
ERYTHROCYTE [DISTWIDTH] IN BLOOD BY AUTOMATED COUNT: 17.5 % (ref 10–15)
FASTING STATUS PATIENT QL REPORTED: YES
FASTING STATUS PATIENT QL REPORTED: YES
GFR SERPL CREATININE-BSD FRML MDRD: >90 ML/MIN/1.73M2
GGT SERPL-CCNC: 1817 U/L (ref 0–40)
GLUCOSE BLD-MCNC: 93 MG/DL (ref 70–99)
GLUCOSE SERPL-MCNC: 135 MG/DL (ref 70–99)
GLUCOSE UR STRIP-MCNC: NEGATIVE MG/DL
GROUP A STREP BY PCR: NOT DETECTED
HBA1C MFR BLD: 5.1 %
HBA1C MFR BLD: 5.6 % (ref 0–5.6)
HBV SURFACE AG SERPL QL IA: NONREACTIVE
HCG UR QL: NEGATIVE
HCT VFR BLD AUTO: 38.1 % (ref 35–47)
HCT VFR BLD AUTO: 38.7 % (ref 35–47)
HCT VFR BLD AUTO: 38.9 % (ref 35–47)
HCT VFR BLD AUTO: 41.1 % (ref 35–47)
HCT VFR BLD AUTO: 42.3 % (ref 35–47)
HCT VFR BLD AUTO: 46.4 % (ref 35–47)
HCT VFR BLD AUTO: 47.1 % (ref 35–47)
HCV AB SERPL QL IA: NONREACTIVE
HDLC SERPL-MCNC: 43 MG/DL
HDLC SERPL-MCNC: 78 MG/DL
HGB BLD-MCNC: 12.8 G/DL (ref 11.7–15.7)
HGB BLD-MCNC: 13.2 G/DL (ref 11.7–15.7)
HGB BLD-MCNC: 13.5 G/DL (ref 11.7–15.7)
HGB BLD-MCNC: 14.1 G/DL (ref 11.7–15.7)
HGB BLD-MCNC: 14.4 G/DL (ref 11.7–15.7)
HGB BLD-MCNC: 15 G/DL (ref 11.7–15.7)
HGB BLD-MCNC: 15.8 G/DL (ref 11.7–15.7)
HGB UR QL STRIP: NEGATIVE
HIV 1+2 AB+HIV1 P24 AG SERPL QL IA: NONREACTIVE
HOLD SPECIMEN: NORMAL
HSV1 IGG SERPL QL IA: 7.97 INDEX
HSV1 IGG SERPL QL IA: ABNORMAL
HSV2 IGG SERPL QL IA: 0.14 INDEX
HSV2 IGG SERPL QL IA: ABNORMAL
HUMAN PAPILLOMA VIRUS 16 DNA: NEGATIVE
HUMAN PAPILLOMA VIRUS 18 DNA: NEGATIVE
HUMAN PAPILLOMA VIRUS FINAL DIAGNOSIS: ABNORMAL
HUMAN PAPILLOMA VIRUS OTHER HR: POSITIVE
IMM GRANULOCYTES # BLD: 0.1 10E3/UL
IMM GRANULOCYTES # BLD: 0.1 10E3/UL
IMM GRANULOCYTES NFR BLD: 1 %
IMM GRANULOCYTES NFR BLD: 1 %
INR PPP: 1.08 (ref 0.85–1.15)
INR PPP: 1.12 (ref 0.85–1.15)
INR PPP: 1.14 (ref 0.85–1.15)
KETONES UR STRIP-MCNC: NEGATIVE MG/DL
LDLC SERPL CALC-MCNC: 84 MG/DL
LDLC SERPL CALC-MCNC: 99 MG/DL
LEUKOCYTE ESTERASE UR QL STRIP: ABNORMAL
LEUKOCYTE ESTERASE UR QL STRIP: ABNORMAL
LEUKOCYTE ESTERASE UR QL STRIP: NEGATIVE
LEUKOCYTE ESTERASE UR QL STRIP: NEGATIVE
LYMPHOCYTES # BLD AUTO: 2.5 10E3/UL (ref 0.8–5.3)
LYMPHOCYTES # BLD AUTO: 3.1 10E3/UL (ref 0.8–5.3)
LYMPHOCYTES # BLD AUTO: 3.3 10E3/UL (ref 0.8–5.3)
LYMPHOCYTES NFR BLD AUTO: 28 %
LYMPHOCYTES NFR BLD AUTO: 28 %
LYMPHOCYTES NFR BLD AUTO: 34 %
MCH RBC QN AUTO: 31.2 PG (ref 26.5–33)
MCH RBC QN AUTO: 31.2 PG (ref 26.5–33)
MCH RBC QN AUTO: 32.3 PG (ref 26.5–33)
MCH RBC QN AUTO: 32.4 PG (ref 26.5–33)
MCH RBC QN AUTO: 32.8 PG (ref 26.5–33)
MCHC RBC AUTO-ENTMCNC: 32.3 G/DL (ref 31.5–36.5)
MCHC RBC AUTO-ENTMCNC: 33.5 G/DL (ref 31.5–36.5)
MCHC RBC AUTO-ENTMCNC: 33.6 G/DL (ref 31.5–36.5)
MCHC RBC AUTO-ENTMCNC: 33.9 G/DL (ref 31.5–36.5)
MCHC RBC AUTO-ENTMCNC: 34 G/DL (ref 31.5–36.5)
MCHC RBC AUTO-ENTMCNC: 34.3 G/DL (ref 31.5–36.5)
MCHC RBC AUTO-ENTMCNC: 34.9 G/DL (ref 31.5–36.5)
MCV RBC AUTO: 100 FL (ref 78–100)
MCV RBC AUTO: 92 FL (ref 78–100)
MCV RBC AUTO: 93 FL (ref 78–100)
MCV RBC AUTO: 93 FL (ref 78–100)
MCV RBC AUTO: 95 FL (ref 78–100)
MCV RBC AUTO: 95 FL (ref 78–100)
MCV RBC AUTO: 98 FL (ref 78–100)
MONOCYTES # BLD AUTO: 0.7 10E3/UL (ref 0–1.3)
MONOCYTES # BLD AUTO: 0.9 10E3/UL (ref 0–1.3)
MONOCYTES # BLD AUTO: 1.3 10E3/UL (ref 0–1.3)
MONOCYTES NFR BLD AUTO: 11 %
MONOCYTES NFR BLD AUTO: 13 %
MONOCYTES NFR BLD AUTO: 6 %
MONOCYTES NFR BLD AUTO: NEGATIVE %
N GONORRHOEA DNA SPEC QL NAA+PROBE: NEGATIVE
N GONORRHOEA DNA SPEC QL NAA+PROBE: NEGATIVE
NEUTROPHILS # BLD AUTO: 3.6 10E3/UL (ref 1.6–8.3)
NEUTROPHILS # BLD AUTO: 6.7 10E3/UL (ref 1.6–8.3)
NEUTROPHILS # BLD AUTO: 7 10E3/UL (ref 1.6–8.3)
NEUTROPHILS NFR BLD AUTO: 50 %
NEUTROPHILS NFR BLD AUTO: 58 %
NEUTROPHILS NFR BLD AUTO: 61 %
NITRATE UR QL: NEGATIVE
NONHDLC SERPL-MCNC: 101 MG/DL
NONHDLC SERPL-MCNC: 120 MG/DL
NRBC # BLD AUTO: 0 10E3/UL
NRBC # BLD AUTO: 0 10E3/UL
NRBC BLD AUTO-RTO: 0 /100
NRBC BLD AUTO-RTO: 0 /100
OPIATES UR QL SCN: NORMAL
PATH REPORT.COMMENTS IMP SPEC: ABNORMAL
PATH REPORT.COMMENTS IMP SPEC: ABNORMAL
PATH REPORT.RELEVANT HX SPEC: ABNORMAL
PH UR STRIP: 6 [PH] (ref 5–7)
PH UR STRIP: 6.5 [PH] (ref 5–7)
PH UR STRIP: 6.5 [PH] (ref 5–7)
PH UR STRIP: 7.5 [PH] (ref 5–7)
PLATELET # BLD AUTO: 152 10E3/UL (ref 150–450)
PLATELET # BLD AUTO: 165 10E3/UL (ref 150–450)
PLATELET # BLD AUTO: 184 10E3/UL (ref 150–450)
PLATELET # BLD AUTO: 190 10E3/UL (ref 150–450)
PLATELET # BLD AUTO: 191 10E3/UL (ref 150–450)
PLATELET # BLD AUTO: 204 10E3/UL (ref 150–450)
PLATELET # BLD AUTO: 242 10E3/UL (ref 150–450)
POTASSIUM BLD-SCNC: 3.9 MMOL/L (ref 3.4–5.3)
PROT SERPL-MCNC: 6.9 G/DL (ref 6.8–8.8)
PROT SERPL-MCNC: 7.8 G/DL (ref 6.4–8.3)
PROT SERPL-MCNC: 8.1 G/DL (ref 6.8–8.8)
PROT SERPL-MCNC: 8.3 G/DL (ref 6.8–8.8)
RBC # BLD AUTO: 3.9 10E6/UL (ref 3.8–5.2)
RBC # BLD AUTO: 4.08 10E6/UL (ref 3.8–5.2)
RBC # BLD AUTO: 4.18 10E6/UL (ref 3.8–5.2)
RBC # BLD AUTO: 4.35 10E6/UL (ref 3.8–5.2)
RBC # BLD AUTO: 4.62 10E6/UL (ref 3.8–5.2)
RBC # BLD AUTO: 4.63 10E6/UL (ref 3.8–5.2)
RBC # BLD AUTO: 5.06 10E6/UL (ref 3.8–5.2)
RBC URINE: 5 /HPF
RBC URINE: <1 /HPF
SARS-COV-2 RNA RESP QL NAA+PROBE: NEGATIVE
SARS-COV-2 RNA RESP QL NAA+PROBE: NEGATIVE
SODIUM SERPL-SCNC: 139 MMOL/L (ref 133–144)
SP GR UR STRIP: 1 (ref 1–1.03)
SP GR UR STRIP: 1.01 (ref 1–1.03)
SQUAMOUS EPITHELIAL: 1 /HPF
SQUAMOUS EPITHELIAL: <1 /HPF
T PALLIDUM AB SER QL: NONREACTIVE
TRICHOMONAS, WET PREP: ABNORMAL
TRIGL SERPL-MCNC: 104 MG/DL
TRIGL SERPL-MCNC: 84 MG/DL
TSH SERPL DL<=0.005 MIU/L-ACNC: 2.21 MU/L (ref 0.4–4)
TSH SERPL DL<=0.005 MIU/L-ACNC: 2.46 MU/L (ref 0.4–4)
TSH SERPL DL<=0.005 MIU/L-ACNC: 3.59 UIU/ML (ref 0.3–4.2)
UROBILINOGEN UR STRIP-MCNC: NORMAL MG/DL
WBC # BLD AUTO: 10.9 10E3/UL (ref 4–11)
WBC # BLD AUTO: 11.9 10E3/UL (ref 4–11)
WBC # BLD AUTO: 12.3 10E3/UL (ref 4–11)
WBC # BLD AUTO: 13.5 10E3/UL (ref 4–11)
WBC # BLD AUTO: 14.8 10E3/UL (ref 4–11)
WBC # BLD AUTO: 7.2 10E3/UL (ref 4–11)
WBC # BLD AUTO: 9.3 10E3/UL (ref 4–11)
WBC URINE: 1 /HPF
WBC URINE: 175 /HPF
WBC'S/HIGH POWER FIELD, WET PREP: ABNORMAL
YEAST, WET PREP: ABNORMAL

## 2022-01-01 PROCEDURE — 82105 ALPHA-FETOPROTEIN SERUM: CPT

## 2022-01-01 PROCEDURE — 99285 EMERGENCY DEPT VISIT HI MDM: CPT | Mod: 25 | Performed by: EMERGENCY MEDICINE

## 2022-01-01 PROCEDURE — U0003 INFECTIOUS AGENT DETECTION BY NUCLEIC ACID (DNA OR RNA); SEVERE ACUTE RESPIRATORY SYNDROME CORONAVIRUS 2 (SARS-COV-2) (CORONAVIRUS DISEASE [COVID-19]), AMPLIFIED PROBE TECHNIQUE, MAKING USE OF HIGH THROUGHPUT TECHNOLOGIES AS DESCRIBED BY CMS-2020-01-R: HCPCS | Performed by: EMERGENCY MEDICINE

## 2022-01-01 PROCEDURE — 99283 EMERGENCY DEPT VISIT LOW MDM: CPT

## 2022-01-01 PROCEDURE — 250N000013 HC RX MED GY IP 250 OP 250 PS 637: Performed by: EMERGENCY MEDICINE

## 2022-01-01 PROCEDURE — 36415 COLL VENOUS BLD VENIPUNCTURE: CPT | Performed by: EMERGENCY MEDICINE

## 2022-01-01 PROCEDURE — 86780 TREPONEMA PALLIDUM: CPT | Performed by: OBSTETRICS & GYNECOLOGY

## 2022-01-01 PROCEDURE — 81003 URINALYSIS AUTO W/O SCOPE: CPT | Mod: QW

## 2022-01-01 PROCEDURE — 84443 ASSAY THYROID STIM HORMONE: CPT

## 2022-01-01 PROCEDURE — 250N000013 HC RX MED GY IP 250 OP 250 PS 637: Performed by: PSYCHIATRY & NEUROLOGY

## 2022-01-01 PROCEDURE — 84443 ASSAY THYROID STIM HORMONE: CPT | Performed by: PSYCHIATRY & NEUROLOGY

## 2022-01-01 PROCEDURE — 90792 PSYCH DIAG EVAL W/MED SRVCS: CPT | Mod: GT | Performed by: STUDENT IN AN ORGANIZED HEALTH CARE EDUCATION/TRAINING PROGRAM

## 2022-01-01 PROCEDURE — 250N000013 HC RX MED GY IP 250 OP 250 PS 637: Performed by: PHYSICIAN ASSISTANT

## 2022-01-01 PROCEDURE — U0003 INFECTIOUS AGENT DETECTION BY NUCLEIC ACID (DNA OR RNA); SEVERE ACUTE RESPIRATORY SYNDROME CORONAVIRUS 2 (SARS-COV-2) (CORONAVIRUS DISEASE [COVID-19]), AMPLIFIED PROBE TECHNIQUE, MAKING USE OF HIGH THROUGHPUT TECHNOLOGIES AS DESCRIBED BY CMS-2020-01-R: HCPCS

## 2022-01-01 PROCEDURE — 87651 STREP A DNA AMP PROBE: CPT | Performed by: INTERNAL MEDICINE

## 2022-01-01 PROCEDURE — 99239 HOSP IP/OBS DSCHRG MGMT >30: CPT | Performed by: PSYCHIATRY & NEUROLOGY

## 2022-01-01 PROCEDURE — 77067 SCR MAMMO BI INCL CAD: CPT

## 2022-01-01 PROCEDURE — 36415 COLL VENOUS BLD VENIPUNCTURE: CPT | Performed by: OBSTETRICS & GYNECOLOGY

## 2022-01-01 PROCEDURE — 31500 INSERT EMERGENCY AIRWAY: CPT

## 2022-01-01 PROCEDURE — 99214 OFFICE O/P EST MOD 30 MIN: CPT | Mod: GC | Performed by: STUDENT IN AN ORGANIZED HEALTH CARE EDUCATION/TRAINING PROGRAM

## 2022-01-01 PROCEDURE — 71046 X-RAY EXAM CHEST 2 VIEWS: CPT | Performed by: RADIOLOGY

## 2022-01-01 PROCEDURE — 36415 COLL VENOUS BLD VENIPUNCTURE: CPT | Performed by: INTERNAL MEDICINE

## 2022-01-01 PROCEDURE — 82075 ASSAY OF BREATH ETHANOL: CPT | Performed by: EMERGENCY MEDICINE

## 2022-01-01 PROCEDURE — 87088 URINE BACTERIA CULTURE: CPT

## 2022-01-01 PROCEDURE — 82977 ASSAY OF GGT: CPT | Performed by: PSYCHIATRY & NEUROLOGY

## 2022-01-01 PROCEDURE — 99214 OFFICE O/P EST MOD 30 MIN: CPT | Mod: 95 | Performed by: STUDENT IN AN ORGANIZED HEALTH CARE EDUCATION/TRAINING PROGRAM

## 2022-01-01 PROCEDURE — 99215 OFFICE O/P EST HI 40 MIN: CPT | Mod: GT | Performed by: STUDENT IN AN ORGANIZED HEALTH CARE EDUCATION/TRAINING PROGRAM

## 2022-01-01 PROCEDURE — 87086 URINE CULTURE/COLONY COUNT: CPT

## 2022-01-01 PROCEDURE — 36415 COLL VENOUS BLD VENIPUNCTURE: CPT

## 2022-01-01 PROCEDURE — 99214 OFFICE O/P EST MOD 30 MIN: CPT | Mod: 95 | Performed by: PSYCHIATRY & NEUROLOGY

## 2022-01-01 PROCEDURE — 999N000157 HC STATISTIC RCP TIME EA 10 MIN

## 2022-01-01 PROCEDURE — 71250 CT THORAX DX C-: CPT

## 2022-01-01 PROCEDURE — 99285 EMERGENCY DEPT VISIT HI MDM: CPT | Performed by: EMERGENCY MEDICINE

## 2022-01-01 PROCEDURE — 90791 PSYCH DIAGNOSTIC EVALUATION: CPT

## 2022-01-01 PROCEDURE — 82565 ASSAY OF CREATININE: CPT

## 2022-01-01 PROCEDURE — 84443 ASSAY THYROID STIM HORMONE: CPT | Performed by: INTERNAL MEDICINE

## 2022-01-01 PROCEDURE — 250N000009 HC RX 250: Performed by: EMERGENCY MEDICINE

## 2022-01-01 PROCEDURE — 76700 US EXAM ABDOM COMPLETE: CPT

## 2022-01-01 PROCEDURE — 87389 HIV-1 AG W/HIV-1&-2 AB AG IA: CPT | Performed by: OBSTETRICS & GYNECOLOGY

## 2022-01-01 PROCEDURE — 36415 COLL VENOUS BLD VENIPUNCTURE: CPT | Performed by: PHYSICIAN ASSISTANT

## 2022-01-01 PROCEDURE — 80076 HEPATIC FUNCTION PANEL: CPT | Performed by: PHYSICIAN ASSISTANT

## 2022-01-01 PROCEDURE — 96375 TX/PRO/DX INJ NEW DRUG ADDON: CPT

## 2022-01-01 PROCEDURE — 99282 EMERGENCY DEPT VISIT SF MDM: CPT | Performed by: EMERGENCY MEDICINE

## 2022-01-01 PROCEDURE — 80307 DRUG TEST PRSMV CHEM ANLYZR: CPT | Performed by: EMERGENCY MEDICINE

## 2022-01-01 PROCEDURE — 99213 OFFICE O/P EST LOW 20 MIN: CPT | Performed by: INTERNAL MEDICINE

## 2022-01-01 PROCEDURE — 250N000011 HC RX IP 250 OP 636: Performed by: EMERGENCY MEDICINE

## 2022-01-01 PROCEDURE — 99213 OFFICE O/P EST LOW 20 MIN: CPT | Performed by: OBSTETRICS & GYNECOLOGY

## 2022-01-01 PROCEDURE — 80053 COMPREHEN METABOLIC PANEL: CPT | Performed by: EMERGENCY MEDICINE

## 2022-01-01 PROCEDURE — 96374 THER/PROPH/DIAG INJ IV PUSH: CPT

## 2022-01-01 PROCEDURE — 36415 COLL VENOUS BLD VENIPUNCTURE: CPT | Performed by: PSYCHIATRY & NEUROLOGY

## 2022-01-01 PROCEDURE — 86803 HEPATITIS C AB TEST: CPT | Performed by: OBSTETRICS & GYNECOLOGY

## 2022-01-01 PROCEDURE — 99223 1ST HOSP IP/OBS HIGH 75: CPT | Mod: AI | Performed by: PSYCHIATRY & NEUROLOGY

## 2022-01-01 PROCEDURE — 99215 OFFICE O/P EST HI 40 MIN: CPT | Mod: 95

## 2022-01-01 PROCEDURE — 81025 URINE PREGNANCY TEST: CPT | Performed by: EMERGENCY MEDICINE

## 2022-01-01 PROCEDURE — 83718 ASSAY OF LIPOPROTEIN: CPT | Performed by: PSYCHIATRY & NEUROLOGY

## 2022-01-01 PROCEDURE — 87591 N.GONORRHOEAE DNA AMP PROB: CPT | Performed by: OBSTETRICS & GYNECOLOGY

## 2022-01-01 PROCEDURE — 83036 HEMOGLOBIN GLYCOSYLATED A1C: CPT | Performed by: PSYCHIATRY & NEUROLOGY

## 2022-01-01 PROCEDURE — 87491 CHLMYD TRACH DNA AMP PROBE: CPT | Performed by: OBSTETRICS & GYNECOLOGY

## 2022-01-01 PROCEDURE — 81001 URINALYSIS AUTO W/SCOPE: CPT | Performed by: PHYSICIAN ASSISTANT

## 2022-01-01 PROCEDURE — C9803 HOPD COVID-19 SPEC COLLECT: HCPCS | Performed by: EMERGENCY MEDICINE

## 2022-01-01 PROCEDURE — 85610 PROTHROMBIN TIME: CPT | Performed by: EMERGENCY MEDICINE

## 2022-01-01 PROCEDURE — 128N000001 HC R&B CD/MH ADULT

## 2022-01-01 PROCEDURE — 99396 PREV VISIT EST AGE 40-64: CPT | Performed by: INTERNAL MEDICINE

## 2022-01-01 PROCEDURE — 85027 COMPLETE CBC AUTOMATED: CPT

## 2022-01-01 PROCEDURE — 82105 ALPHA-FETOPROTEIN SERUM: CPT | Mod: GA

## 2022-01-01 PROCEDURE — 99421 OL DIG E/M SVC 5-10 MIN: CPT | Performed by: INTERNAL MEDICINE

## 2022-01-01 PROCEDURE — 88141 CYTOPATH C/V INTERPRET: CPT | Performed by: PATHOLOGY

## 2022-01-01 PROCEDURE — 86308 HETEROPHILE ANTIBODY SCREEN: CPT

## 2022-01-01 PROCEDURE — 99207 PR NO CHARGE LOS: CPT | Performed by: PHARMACIST

## 2022-01-01 PROCEDURE — 93000 ELECTROCARDIOGRAM COMPLETE: CPT | Performed by: INTERNAL MEDICINE

## 2022-01-01 PROCEDURE — 99443 PR PHYSICIAN TELEPHONE EVALUATION 21-30 MIN: CPT | Performed by: INTERNAL MEDICINE

## 2022-01-01 PROCEDURE — 87624 HPV HI-RISK TYP POOLED RSLT: CPT | Performed by: OBSTETRICS & GYNECOLOGY

## 2022-01-01 PROCEDURE — 92950 HEART/LUNG RESUSCITATION CPR: CPT

## 2022-01-01 PROCEDURE — 99232 SBSQ HOSP IP/OBS MODERATE 35: CPT | Performed by: PHYSICIAN ASSISTANT

## 2022-01-01 PROCEDURE — 83036 HEMOGLOBIN GLYCOSYLATED A1C: CPT

## 2022-01-01 PROCEDURE — U0005 INFEC AGEN DETEC AMPLI PROBE: HCPCS

## 2022-01-01 PROCEDURE — 86695 HERPES SIMPLEX TYPE 1 TEST: CPT | Performed by: OBSTETRICS & GYNECOLOGY

## 2022-01-01 PROCEDURE — 87210 SMEAR WET MOUNT SALINE/INK: CPT | Performed by: OBSTETRICS & GYNECOLOGY

## 2022-01-01 PROCEDURE — 81001 URINALYSIS AUTO W/SCOPE: CPT | Performed by: EMERGENCY MEDICINE

## 2022-01-01 PROCEDURE — 80061 LIPID PANEL: CPT | Performed by: INTERNAL MEDICINE

## 2022-01-01 PROCEDURE — 85610 PROTHROMBIN TIME: CPT

## 2022-01-01 PROCEDURE — 99285 EMERGENCY DEPT VISIT HI MDM: CPT | Mod: 25

## 2022-01-01 PROCEDURE — 87086 URINE CULTURE/COLONY COUNT: CPT | Performed by: PHYSICIAN ASSISTANT

## 2022-01-01 PROCEDURE — 85004 AUTOMATED DIFF WBC COUNT: CPT | Performed by: EMERGENCY MEDICINE

## 2022-01-01 PROCEDURE — 87491 CHLMYD TRACH DNA AMP PROBE: CPT

## 2022-01-01 PROCEDURE — 85027 COMPLETE CBC AUTOMATED: CPT | Performed by: PHYSICIAN ASSISTANT

## 2022-01-01 PROCEDURE — 85025 COMPLETE CBC W/AUTO DIFF WBC: CPT

## 2022-01-01 PROCEDURE — 86696 HERPES SIMPLEX TYPE 2 TEST: CPT | Performed by: OBSTETRICS & GYNECOLOGY

## 2022-01-01 PROCEDURE — 82947 ASSAY GLUCOSE BLOOD QUANT: CPT

## 2022-01-01 PROCEDURE — 85025 COMPLETE CBC W/AUTO DIFF WBC: CPT | Performed by: INTERNAL MEDICINE

## 2022-01-01 PROCEDURE — 85014 HEMATOCRIT: CPT | Performed by: PHYSICIAN ASSISTANT

## 2022-01-01 PROCEDURE — 87591 N.GONORRHOEAE DNA AMP PROB: CPT

## 2022-01-01 PROCEDURE — 74019 RADEX ABDOMEN 2 VIEWS: CPT

## 2022-01-01 PROCEDURE — HZ2ZZZZ DETOXIFICATION SERVICES FOR SUBSTANCE ABUSE TREATMENT: ICD-10-PCS | Performed by: PSYCHIATRY & NEUROLOGY

## 2022-01-01 PROCEDURE — 82040 ASSAY OF SERUM ALBUMIN: CPT

## 2022-01-01 PROCEDURE — 36415 COLL VENOUS BLD VENIPUNCTURE: CPT | Mod: GA

## 2022-01-01 PROCEDURE — 87340 HEPATITIS B SURFACE AG IA: CPT | Performed by: OBSTETRICS & GYNECOLOGY

## 2022-01-01 PROCEDURE — 80076 HEPATIC FUNCTION PANEL: CPT

## 2022-01-01 PROCEDURE — 88175 CYTOPATH C/V AUTO FLUID REDO: CPT | Performed by: OBSTETRICS & GYNECOLOGY

## 2022-01-01 PROCEDURE — 76705 ECHO EXAM OF ABDOMEN: CPT

## 2022-01-01 RX ORDER — ATORVASTATIN CALCIUM 10 MG/1
TABLET, FILM COATED ORAL
Qty: 90 TABLET | Refills: 3 | Status: SHIPPED | OUTPATIENT
Start: 2022-01-01 | End: 2022-01-01

## 2022-01-01 RX ORDER — SULFAMETHOXAZOLE/TRIMETHOPRIM 800-160 MG
1 TABLET ORAL 2 TIMES DAILY
Qty: 10 TABLET | Refills: 0 | Status: SHIPPED | OUTPATIENT
Start: 2022-01-01

## 2022-01-01 RX ORDER — CLONAZEPAM 0.5 MG/1
0.5 TABLET ORAL EVERY MORNING
Qty: 14 TABLET | Refills: 0 | Status: ON HOLD | OUTPATIENT
Start: 2022-01-01 | End: 2022-01-01

## 2022-01-01 RX ORDER — CLONAZEPAM 1 MG/1
TABLET ORAL
Qty: 270 TABLET | Refills: 0 | OUTPATIENT
Start: 2022-01-01

## 2022-01-01 RX ORDER — CARVEDILOL 12.5 MG/1
TABLET ORAL
Qty: 180 TABLET | Refills: 3 | Status: SHIPPED | OUTPATIENT
Start: 2022-01-01 | End: 2022-01-01

## 2022-01-01 RX ORDER — LORAZEPAM 1 MG/1
1-4 TABLET ORAL EVERY 30 MIN PRN
Status: DISCONTINUED | OUTPATIENT
Start: 2022-01-01 | End: 2022-01-01 | Stop reason: HOSPADM

## 2022-01-01 RX ORDER — EPINEPHRINE IN SOD CHLOR,ISO 1 MG/10 ML
1 SYRINGE (ML) INTRAVENOUS ONCE
Status: COMPLETED | OUTPATIENT
Start: 2022-01-01 | End: 2022-01-01

## 2022-01-01 RX ORDER — OLANZAPINE 20 MG/1
20 TABLET ORAL AT BEDTIME
Status: DISCONTINUED | OUTPATIENT
Start: 2022-01-01 | End: 2022-01-01 | Stop reason: HOSPADM

## 2022-01-01 RX ORDER — ACETAMINOPHEN AND CODEINE PHOSPHATE 120; 12 MG/5ML; MG/5ML
0.35 SOLUTION ORAL DAILY
Qty: 90 TABLET | Refills: 0 | Status: SHIPPED | OUTPATIENT
Start: 2022-01-01 | End: 2022-01-01

## 2022-01-01 RX ORDER — NITROFURANTOIN 25; 75 MG/1; MG/1
100 CAPSULE ORAL AT BEDTIME
Status: DISCONTINUED | OUTPATIENT
Start: 2022-01-01 | End: 2022-01-01 | Stop reason: HOSPADM

## 2022-01-01 RX ORDER — OLANZAPINE 5 MG/1
TABLET ORAL
Qty: 120 TABLET | Refills: 3 | Status: SHIPPED | OUTPATIENT
Start: 2022-01-01 | End: 2022-01-01

## 2022-01-01 RX ORDER — NITROFURANTOIN 25; 75 MG/1; MG/1
100 CAPSULE ORAL SEE ADMIN INSTRUCTIONS
Qty: 90 CAPSULE | Refills: 0 | Status: SHIPPED | OUTPATIENT
Start: 2022-01-01

## 2022-01-01 RX ORDER — PANTOPRAZOLE SODIUM 40 MG/1
40 TABLET, DELAYED RELEASE ORAL 2 TIMES DAILY
Status: DISCONTINUED | OUTPATIENT
Start: 2022-01-01 | End: 2022-01-01 | Stop reason: HOSPADM

## 2022-01-01 RX ORDER — AMOXICILLIN 250 MG
2 CAPSULE ORAL 2 TIMES DAILY
Status: DISCONTINUED | OUTPATIENT
Start: 2022-01-01 | End: 2022-01-01

## 2022-01-01 RX ORDER — LEVOTHYROXINE SODIUM 50 UG/1
50 TABLET ORAL DAILY
Status: DISCONTINUED | OUTPATIENT
Start: 2022-01-01 | End: 2022-01-01 | Stop reason: HOSPADM

## 2022-01-01 RX ORDER — HYDROXYZINE HYDROCHLORIDE 50 MG/1
50 TABLET, FILM COATED ORAL 3 TIMES DAILY PRN
Status: DISCONTINUED | OUTPATIENT
Start: 2022-01-01 | End: 2022-01-01 | Stop reason: HOSPADM

## 2022-01-01 RX ORDER — HYDROXYZINE HYDROCHLORIDE 25 MG/1
25 TABLET, FILM COATED ORAL EVERY 4 HOURS PRN
Status: DISCONTINUED | OUTPATIENT
Start: 2022-01-01 | End: 2022-01-01

## 2022-01-01 RX ORDER — AMOXICILLIN 250 MG
2 CAPSULE ORAL
Status: DISCONTINUED | OUTPATIENT
Start: 2022-01-01 | End: 2022-01-01

## 2022-01-01 RX ORDER — ATORVASTATIN CALCIUM 10 MG/1
TABLET, FILM COATED ORAL
Qty: 90 TABLET | Refills: 0 | Status: SHIPPED | OUTPATIENT
Start: 2022-01-01

## 2022-01-01 RX ORDER — AMOXICILLIN 250 MG
1 CAPSULE ORAL 2 TIMES DAILY PRN
Status: DISCONTINUED | OUTPATIENT
Start: 2022-01-01 | End: 2022-01-01 | Stop reason: HOSPADM

## 2022-01-01 RX ORDER — HYDROXYZINE HYDROCHLORIDE 50 MG/1
50 TABLET, FILM COATED ORAL 3 TIMES DAILY PRN
Qty: 90 TABLET | Refills: 0 | Status: SHIPPED | OUTPATIENT
Start: 2022-01-01

## 2022-01-01 RX ORDER — OLANZAPINE 5 MG/1
TABLET ORAL
Qty: 120 TABLET | Refills: 2 | Status: SHIPPED | OUTPATIENT
Start: 2022-01-01 | End: 2022-01-01

## 2022-01-01 RX ORDER — CLONAZEPAM 1 MG/1
TABLET ORAL
Qty: 82 TABLET | Refills: 0 | Status: ON HOLD | OUTPATIENT
Start: 2022-01-01 | End: 2022-01-01

## 2022-01-01 RX ORDER — OLANZAPINE 20 MG/1
20 TABLET ORAL AT BEDTIME
Qty: 30 TABLET | Refills: 3 | Status: SHIPPED | OUTPATIENT
Start: 2022-01-01 | End: 2022-01-01

## 2022-01-01 RX ORDER — OLANZAPINE 5 MG/1
TABLET ORAL
Qty: 120 TABLET | Refills: 0 | Status: SHIPPED | OUTPATIENT
Start: 2022-01-01 | End: 2022-01-01

## 2022-01-01 RX ORDER — CARVEDILOL 12.5 MG/1
TABLET ORAL
Qty: 180 TABLET | Refills: 2 | Status: SHIPPED | OUTPATIENT
Start: 2022-01-01

## 2022-01-01 RX ORDER — FOLIC ACID 1 MG/1
1 TABLET ORAL DAILY
Status: DISCONTINUED | OUTPATIENT
Start: 2022-01-01 | End: 2022-01-01

## 2022-01-01 RX ORDER — BISACODYL 5 MG/1
15 TABLET, DELAYED RELEASE ORAL SEE ADMIN INSTRUCTIONS
Qty: 3 TABLET | Refills: 0 | Status: SHIPPED | OUTPATIENT
Start: 2022-01-01

## 2022-01-01 RX ORDER — OLANZAPINE 20 MG/1
20 TABLET ORAL AT BEDTIME
Qty: 90 TABLET | Refills: 0 | Status: SHIPPED | OUTPATIENT
Start: 2022-01-01

## 2022-01-01 RX ORDER — HYDROXYZINE PAMOATE 50 MG/1
CAPSULE ORAL
Qty: 270 CAPSULE | Refills: 0 | OUTPATIENT
Start: 2022-01-01

## 2022-01-01 RX ORDER — CLONAZEPAM 0.5 MG/1
TABLET ORAL
Qty: 45 TABLET | OUTPATIENT
Start: 2022-01-01

## 2022-01-01 RX ORDER — FLUOXETINE 10 MG/1
10 CAPSULE ORAL DAILY
Qty: 30 CAPSULE | Refills: 1 | Status: SHIPPED | OUTPATIENT
Start: 2022-01-01 | End: 2022-01-01

## 2022-01-01 RX ORDER — POLYETHYLENE GLYCOL 3350 17 G/17G
17 POWDER, FOR SOLUTION ORAL DAILY
Status: DISCONTINUED | OUTPATIENT
Start: 2022-01-01 | End: 2022-01-01 | Stop reason: HOSPADM

## 2022-01-01 RX ORDER — MAGNESIUM HYDROXIDE/ALUMINUM HYDROXICE/SIMETHICONE 120; 1200; 1200 MG/30ML; MG/30ML; MG/30ML
30 SUSPENSION ORAL EVERY 4 HOURS PRN
Status: DISCONTINUED | OUTPATIENT
Start: 2022-01-01 | End: 2022-01-01 | Stop reason: HOSPADM

## 2022-01-01 RX ORDER — OLANZAPINE 5 MG/1
5 TABLET ORAL 2 TIMES DAILY
Status: DISCONTINUED | OUTPATIENT
Start: 2022-01-01 | End: 2022-01-01 | Stop reason: HOSPADM

## 2022-01-01 RX ORDER — PANTOPRAZOLE SODIUM 40 MG/1
TABLET, DELAYED RELEASE ORAL
Qty: 180 TABLET | Refills: 3 | Status: SHIPPED | OUTPATIENT
Start: 2022-01-01

## 2022-01-01 RX ORDER — MULTIPLE VITAMINS W/ MINERALS TAB 9MG-400MCG
1 TAB ORAL DAILY
Status: DISCONTINUED | OUTPATIENT
Start: 2022-01-01 | End: 2022-01-01

## 2022-01-01 RX ORDER — ACETAMINOPHEN AND CODEINE PHOSPHATE 120; 12 MG/5ML; MG/5ML
0.35 SOLUTION ORAL DAILY
Qty: 90 TABLET | Refills: 3 | Status: SHIPPED | OUTPATIENT
Start: 2022-01-01

## 2022-01-01 RX ORDER — MULTIPLE VITAMINS W/ MINERALS TAB 9MG-400MCG
1 TAB ORAL DAILY
Status: DISCONTINUED | OUTPATIENT
Start: 2022-01-01 | End: 2022-01-01 | Stop reason: HOSPADM

## 2022-01-01 RX ORDER — OLANZAPINE 20 MG/1
20 TABLET ORAL AT BEDTIME
Qty: 90 TABLET | Refills: 0 | Status: SHIPPED | OUTPATIENT
Start: 2022-01-01 | End: 2022-01-01

## 2022-01-01 RX ORDER — CYCLOBENZAPRINE HCL 10 MG
10 TABLET ORAL AT BEDTIME
Status: DISCONTINUED | OUTPATIENT
Start: 2022-01-01 | End: 2022-01-01 | Stop reason: HOSPADM

## 2022-01-01 RX ORDER — MIRTAZAPINE 30 MG/1
60 TABLET, FILM COATED ORAL AT BEDTIME
Qty: 60 TABLET | Refills: 3 | Status: SHIPPED | OUTPATIENT
Start: 2022-01-01 | End: 2022-01-01 | Stop reason: ALTCHOICE

## 2022-01-01 RX ORDER — CLONAZEPAM 1 MG/1
TABLET ORAL
Qty: 120 TABLET | Refills: 1 | Status: CANCELLED | OUTPATIENT
Start: 2022-01-01 | End: 2022-01-01

## 2022-01-01 RX ORDER — HYDROXYZINE PAMOATE 50 MG/1
50 CAPSULE ORAL 3 TIMES DAILY PRN
Qty: 90 CAPSULE | Refills: 3 | Status: SHIPPED | OUTPATIENT
Start: 2022-01-01 | End: 2022-01-01

## 2022-01-01 RX ORDER — CYCLOBENZAPRINE HCL 10 MG
TABLET ORAL
Qty: 90 TABLET | Refills: 3 | Status: SHIPPED | OUTPATIENT
Start: 2022-01-01

## 2022-01-01 RX ORDER — POLYETHYLENE GLYCOL 3350 17 G/17G
17 POWDER, FOR SOLUTION ORAL DAILY PRN
Status: DISCONTINUED | OUTPATIENT
Start: 2022-01-01 | End: 2022-01-01 | Stop reason: HOSPADM

## 2022-01-01 RX ORDER — ONDANSETRON 4 MG/1
4 TABLET, FILM COATED ORAL EVERY 8 HOURS PRN
Status: DISCONTINUED | OUTPATIENT
Start: 2022-01-01 | End: 2022-01-01

## 2022-01-01 RX ORDER — CLONAZEPAM 1 MG/1
TABLET ORAL
Qty: 75 TABLET | Refills: 0 | Status: CANCELLED | OUTPATIENT
Start: 2022-01-01 | End: 2022-01-01

## 2022-01-01 RX ORDER — ACETAMINOPHEN AND CODEINE PHOSPHATE 120; 12 MG/5ML; MG/5ML
0.35 SOLUTION ORAL DAILY
Status: DISCONTINUED | OUTPATIENT
Start: 2022-01-01 | End: 2022-01-01 | Stop reason: HOSPADM

## 2022-01-01 RX ORDER — MIRTAZAPINE 30 MG/1
60 TABLET, FILM COATED ORAL AT BEDTIME
Qty: 60 TABLET | Refills: 1 | Status: SHIPPED | OUTPATIENT
Start: 2022-01-01 | End: 2022-01-01

## 2022-01-01 RX ORDER — LANOLIN ALCOHOL/MO/W.PET/CERES
100 CREAM (GRAM) TOPICAL DAILY
Qty: 30 TABLET | Refills: 0 | Status: SHIPPED | OUTPATIENT
Start: 2022-01-01

## 2022-01-01 RX ORDER — HYDROXYZINE HYDROCHLORIDE 50 MG/1
50 TABLET, FILM COATED ORAL EVERY 6 HOURS PRN
Status: DISCONTINUED | OUTPATIENT
Start: 2022-01-01 | End: 2022-01-01

## 2022-01-01 RX ORDER — TRAZODONE HYDROCHLORIDE 50 MG/1
50 TABLET, FILM COATED ORAL
Qty: 180 TABLET | Refills: 0 | Status: SHIPPED | OUTPATIENT
Start: 2022-01-01

## 2022-01-01 RX ORDER — ATORVASTATIN CALCIUM 10 MG/1
10 TABLET, FILM COATED ORAL DAILY
Status: DISCONTINUED | OUTPATIENT
Start: 2022-01-01 | End: 2022-01-01 | Stop reason: HOSPADM

## 2022-01-01 RX ORDER — PANTOPRAZOLE SODIUM 40 MG/1
40 TABLET, DELAYED RELEASE ORAL 2 TIMES DAILY
Qty: 90 TABLET | Refills: 3 | Status: SHIPPED | OUTPATIENT
Start: 2022-01-01 | End: 2022-01-01

## 2022-01-01 RX ORDER — LOPERAMIDE HCL 2 MG
2 CAPSULE ORAL 4 TIMES DAILY PRN
Status: DISCONTINUED | OUTPATIENT
Start: 2022-01-01 | End: 2022-01-01 | Stop reason: HOSPADM

## 2022-01-01 RX ORDER — OLANZAPINE 20 MG/1
20 TABLET ORAL AT BEDTIME
Qty: 30 TABLET | Refills: 1 | Status: SHIPPED | OUTPATIENT
Start: 2022-01-01 | End: 2022-01-01

## 2022-01-01 RX ORDER — NITROFURANTOIN 25; 75 MG/1; MG/1
100 CAPSULE ORAL SEE ADMIN INSTRUCTIONS
Status: DISCONTINUED | OUTPATIENT
Start: 2022-01-01 | End: 2022-01-01

## 2022-01-01 RX ORDER — FOLIC ACID 1 MG/1
1 TABLET ORAL DAILY
Status: DISCONTINUED | OUTPATIENT
Start: 2022-01-01 | End: 2022-01-01 | Stop reason: HOSPADM

## 2022-01-01 RX ORDER — MIRTAZAPINE 30 MG/1
60 TABLET, FILM COATED ORAL AT BEDTIME
Qty: 180 TABLET | Refills: 0 | Status: SHIPPED | OUTPATIENT
Start: 2022-01-01

## 2022-01-01 RX ORDER — OLANZAPINE 5 MG/1
TABLET ORAL
Qty: 120 TABLET | Refills: 0 | Status: SHIPPED | OUTPATIENT
Start: 2022-01-01 | End: 2022-12-14

## 2022-01-01 RX ORDER — MIRTAZAPINE 30 MG/1
60 TABLET, FILM COATED ORAL AT BEDTIME
Qty: 60 TABLET | Refills: 0 | Status: SHIPPED | OUTPATIENT
Start: 2022-01-01 | End: 2022-01-01

## 2022-01-01 RX ORDER — TRAZODONE HYDROCHLORIDE 50 MG/1
50 TABLET, FILM COATED ORAL
Status: DISCONTINUED | OUTPATIENT
Start: 2022-01-01 | End: 2022-01-01 | Stop reason: HOSPADM

## 2022-01-01 RX ORDER — TRAZODONE HYDROCHLORIDE 50 MG/1
50 TABLET, FILM COATED ORAL
Qty: 30 TABLET | Refills: 0 | Status: SHIPPED | OUTPATIENT
Start: 2022-01-01 | End: 2022-01-01

## 2022-01-01 RX ORDER — CLONAZEPAM 1 MG/1
TABLET ORAL
Qty: 90 TABLET | Refills: 0 | Status: SHIPPED | OUTPATIENT
Start: 2022-01-01 | End: 2022-01-01

## 2022-01-01 RX ORDER — CLONAZEPAM 1 MG/1
TABLET ORAL
Qty: 360 TABLET | Refills: 0 | Status: SHIPPED | OUTPATIENT
Start: 2022-01-01 | End: 2022-01-01

## 2022-01-01 RX ORDER — SUMATRIPTAN 25 MG/1
25 TABLET, FILM COATED ORAL
Status: DISCONTINUED | OUTPATIENT
Start: 2022-01-01 | End: 2022-01-01 | Stop reason: HOSPADM

## 2022-01-01 RX ORDER — HYDROXYZINE PAMOATE 50 MG/1
CAPSULE ORAL
Qty: 270 CAPSULE | Refills: 0 | Status: SHIPPED | OUTPATIENT
Start: 2022-01-01 | End: 2022-01-01

## 2022-01-01 RX ORDER — ATENOLOL 50 MG/1
50 TABLET ORAL DAILY PRN
Status: DISCONTINUED | OUTPATIENT
Start: 2022-01-01 | End: 2022-01-01 | Stop reason: HOSPADM

## 2022-01-01 RX ORDER — NALOXONE HYDROCHLORIDE 1 MG/ML
2 INJECTION INTRAMUSCULAR; INTRAVENOUS; SUBCUTANEOUS ONCE
Status: COMPLETED | OUTPATIENT
Start: 2022-01-01 | End: 2022-01-01

## 2022-01-01 RX ORDER — ACETAMINOPHEN AND CODEINE PHOSPHATE 120; 12 MG/5ML; MG/5ML
0.35 SOLUTION ORAL DAILY
Qty: 90 TABLET | Refills: 3 | Status: SHIPPED | OUTPATIENT
Start: 2022-01-01 | End: 2022-01-01

## 2022-01-01 RX ORDER — CLONAZEPAM 0.5 MG/1
0.75 TABLET ORAL EVERY MORNING
Qty: 135 TABLET | Refills: 0 | OUTPATIENT
Start: 2022-01-01

## 2022-01-01 RX ORDER — HYDROXYZINE PAMOATE 50 MG/1
50 CAPSULE ORAL 3 TIMES DAILY PRN
Qty: 90 CAPSULE | Refills: 1 | Status: SHIPPED | OUTPATIENT
Start: 2022-01-01 | End: 2022-01-01

## 2022-01-01 RX ORDER — CLONAZEPAM 1 MG/1
TABLET ORAL
Qty: 75 TABLET | Refills: 0 | Status: SHIPPED | OUTPATIENT
Start: 2022-01-01 | End: 2022-01-01

## 2022-01-01 RX ORDER — ATENOLOL 50 MG/1
50 TABLET ORAL DAILY PRN
Status: DISCONTINUED | OUTPATIENT
Start: 2022-01-01 | End: 2022-01-01

## 2022-01-01 RX ORDER — HYDROXYZINE PAMOATE 50 MG/1
50 CAPSULE ORAL 3 TIMES DAILY PRN
Qty: 270 CAPSULE | Refills: 0 | Status: SHIPPED | OUTPATIENT
Start: 2022-01-01 | End: 2022-01-01

## 2022-01-01 RX ORDER — CARVEDILOL 12.5 MG/1
12.5 TABLET ORAL 2 TIMES DAILY WITH MEALS
Status: DISCONTINUED | OUTPATIENT
Start: 2022-01-01 | End: 2022-01-01 | Stop reason: HOSPADM

## 2022-01-01 RX ORDER — NICOTINE 21 MG/24HR
1 PATCH, TRANSDERMAL 24 HOURS TRANSDERMAL DAILY
Status: DISCONTINUED | OUTPATIENT
Start: 2022-01-01 | End: 2022-01-01 | Stop reason: HOSPADM

## 2022-01-01 RX ORDER — PANTOPRAZOLE SODIUM 40 MG/1
TABLET, DELAYED RELEASE ORAL
Qty: 200 TABLET | Refills: 2 | OUTPATIENT
Start: 2022-01-01

## 2022-01-01 RX ORDER — DIAZEPAM 5 MG
5-20 TABLET ORAL EVERY 30 MIN PRN
Status: DISCONTINUED | OUTPATIENT
Start: 2022-01-01 | End: 2022-01-01

## 2022-01-01 RX ORDER — CLONAZEPAM 0.5 MG/1
0.75 TABLET ORAL EVERY MORNING
Qty: 45 TABLET | Refills: 0 | Status: SHIPPED | OUTPATIENT
Start: 2022-01-01 | End: 2022-01-01

## 2022-01-01 RX ORDER — ONDANSETRON 4 MG/1
4 TABLET, ORALLY DISINTEGRATING ORAL EVERY 6 HOURS PRN
Status: DISCONTINUED | OUTPATIENT
Start: 2022-01-01 | End: 2022-01-01 | Stop reason: HOSPADM

## 2022-01-01 RX ORDER — MIRTAZAPINE 30 MG/1
60 TABLET, FILM COATED ORAL AT BEDTIME
Qty: 180 TABLET | Refills: 0 | Status: SHIPPED | OUTPATIENT
Start: 2022-01-01 | End: 2022-01-01

## 2022-01-01 RX ORDER — LEVOTHYROXINE SODIUM 50 UG/1
TABLET ORAL
Qty: 90 TABLET | Refills: 2 | Status: SHIPPED | OUTPATIENT
Start: 2022-01-01

## 2022-01-01 RX ORDER — LEVOTHYROXINE SODIUM 50 UG/1
TABLET ORAL
Qty: 90 TABLET | Refills: 3 | Status: SHIPPED | OUTPATIENT
Start: 2022-01-01 | End: 2022-01-01

## 2022-01-01 RX ORDER — SULFAMETHOXAZOLE/TRIMETHOPRIM 800-160 MG
1 TABLET ORAL 2 TIMES DAILY
Qty: 10 TABLET | Refills: 0 | Status: SHIPPED | OUTPATIENT
Start: 2022-01-01 | End: 2022-01-01

## 2022-01-01 RX ORDER — MINERAL OIL/HYDROPHIL PETROLAT
OINTMENT (GRAM) TOPICAL DAILY
Status: DISCONTINUED | OUTPATIENT
Start: 2022-01-01 | End: 2022-01-01 | Stop reason: HOSPADM

## 2022-01-01 RX ORDER — LANOLIN ALCOHOL/MO/W.PET/CERES
400 CREAM (GRAM) TOPICAL DAILY
Status: DISCONTINUED | OUTPATIENT
Start: 2022-01-01 | End: 2022-01-01

## 2022-01-01 RX ORDER — OLANZAPINE 5 MG/1
5 TABLET ORAL 4 TIMES DAILY PRN
Status: DISCONTINUED | OUTPATIENT
Start: 2022-01-01 | End: 2022-01-01 | Stop reason: HOSPADM

## 2022-01-01 RX ORDER — ACETAMINOPHEN 325 MG/1
650 TABLET ORAL EVERY 4 HOURS PRN
Status: DISCONTINUED | OUTPATIENT
Start: 2022-01-01 | End: 2022-01-01

## 2022-01-01 RX ORDER — MIRTAZAPINE 45 MG/1
45 TABLET, FILM COATED ORAL AT BEDTIME
Qty: 30 TABLET | Refills: 1 | Status: SHIPPED | OUTPATIENT
Start: 2022-01-01 | End: 2022-01-01

## 2022-01-01 RX ORDER — CYCLOBENZAPRINE HCL 10 MG
10 TABLET ORAL AT BEDTIME
Qty: 90 TABLET | Refills: 3 | Status: SHIPPED | OUTPATIENT
Start: 2022-01-01 | End: 2022-01-01

## 2022-01-01 RX ORDER — OLANZAPINE 5 MG/1
TABLET ORAL
Qty: 120 TABLET | Refills: 1 | Status: SHIPPED | OUTPATIENT
Start: 2022-01-01 | End: 2022-01-01

## 2022-01-01 RX ORDER — HYDROXYZINE HYDROCHLORIDE 50 MG/1
25 TABLET, FILM COATED ORAL 3 TIMES DAILY PRN
Qty: 90 TABLET | Refills: 0 | Status: SHIPPED | OUTPATIENT
Start: 2022-01-01 | End: 2022-01-01

## 2022-01-01 RX ORDER — CLONAZEPAM 0.5 MG/1
0.5 TABLET ORAL EVERY MORNING
Qty: 30 TABLET | Refills: 0 | Status: SHIPPED | OUTPATIENT
Start: 2022-01-01 | End: 2022-01-01

## 2022-01-01 RX ADMIN — EPINEPHRINE 1 MG: 0.1 INJECTION, SOLUTION ENDOTRACHEAL; INTRACARDIAC; INTRAVENOUS at 11:17

## 2022-01-01 RX ADMIN — Medication 1 TABLET: at 15:00

## 2022-01-01 RX ADMIN — OLANZAPINE 5 MG: 5 TABLET, FILM COATED ORAL at 08:06

## 2022-01-01 RX ADMIN — DIAZEPAM 10 MG: 5 TABLET ORAL at 04:44

## 2022-01-01 RX ADMIN — EPINEPHRINE 1 MG: 0.1 INJECTION, SOLUTION ENDOTRACHEAL; INTRACARDIAC; INTRAVENOUS at 11:06

## 2022-01-01 RX ADMIN — THIAMINE HCL TAB 100 MG 100 MG: 100 TAB at 08:14

## 2022-01-01 RX ADMIN — CYCLOBENZAPRINE 10 MG: 10 TABLET, FILM COATED ORAL at 21:13

## 2022-01-01 RX ADMIN — EPINEPHRINE 1 MG: 0.1 INJECTION, SOLUTION ENDOTRACHEAL; INTRACARDIAC; INTRAVENOUS at 11:02

## 2022-01-01 RX ADMIN — OLANZAPINE 5 MG: 5 TABLET, FILM COATED ORAL at 13:27

## 2022-01-01 RX ADMIN — NITROFURANTOIN (MONOHYDRATE/MACROCRYSTALS) 100 MG: 75; 25 CAPSULE ORAL at 21:13

## 2022-01-01 RX ADMIN — ATENOLOL 50 MG: 50 TABLET ORAL at 15:00

## 2022-01-01 RX ADMIN — WHITE PETROLATUM: 1.75 OINTMENT TOPICAL at 13:28

## 2022-01-01 RX ADMIN — HYDROXYZINE HYDROCHLORIDE 50 MG: 50 TABLET, FILM COATED ORAL at 18:11

## 2022-01-01 RX ADMIN — NICOTINE 1 PATCH: 21 PATCH, EXTENDED RELEASE TRANSDERMAL at 08:04

## 2022-01-01 RX ADMIN — NALOXONE HYDROCHLORIDE 2 MG: 1 INJECTION PARENTERAL at 11:07

## 2022-01-01 RX ADMIN — Medication 1 TABLET: at 08:15

## 2022-01-01 RX ADMIN — NORETHINDRONE 0.35 MG: 0.35 TABLET ORAL at 08:08

## 2022-01-01 RX ADMIN — HYDROXYZINE HYDROCHLORIDE 25 MG: 25 TABLET, FILM COATED ORAL at 03:38

## 2022-01-01 RX ADMIN — SODIUM BICARBONATE 50 MEQ: 84 INJECTION INTRAVENOUS at 11:03

## 2022-01-01 RX ADMIN — PANTOPRAZOLE SODIUM 40 MG: 40 TABLET, DELAYED RELEASE ORAL at 19:16

## 2022-01-01 RX ADMIN — TRAZODONE HYDROCHLORIDE 50 MG: 50 TABLET ORAL at 00:01

## 2022-01-01 RX ADMIN — THIAMINE HCL TAB 100 MG 100 MG: 100 TAB at 08:04

## 2022-01-01 RX ADMIN — PANTOPRAZOLE SODIUM 40 MG: 40 TABLET, DELAYED RELEASE ORAL at 11:45

## 2022-01-01 RX ADMIN — THIAMINE HCL TAB 100 MG 100 MG: 100 TAB at 15:00

## 2022-01-01 RX ADMIN — SODIUM BICARBONATE 50 MEQ: 84 INJECTION INTRAVENOUS at 11:12

## 2022-01-01 RX ADMIN — MIRTAZAPINE 60 MG: 45 TABLET, FILM COATED ORAL at 21:12

## 2022-01-01 RX ADMIN — OLANZAPINE 20 MG: 20 TABLET, FILM COATED ORAL at 21:13

## 2022-01-01 RX ADMIN — DIAZEPAM 10 MG: 5 TABLET ORAL at 18:11

## 2022-01-01 RX ADMIN — DIAZEPAM 10 MG: 5 TABLET ORAL at 08:15

## 2022-01-01 RX ADMIN — LEVOTHYROXINE SODIUM 50 MCG: 50 TABLET ORAL at 13:20

## 2022-01-01 RX ADMIN — ATORVASTATIN CALCIUM 10 MG: 10 TABLET, FILM COATED ORAL at 11:48

## 2022-01-01 RX ADMIN — HYDROXYZINE HYDROCHLORIDE 50 MG: 50 TABLET ORAL at 08:04

## 2022-01-01 RX ADMIN — FOLIC ACID 1 MG: 1 TABLET ORAL at 08:14

## 2022-01-01 RX ADMIN — DIAZEPAM 10 MG: 5 TABLET ORAL at 14:59

## 2022-01-01 RX ADMIN — ATORVASTATIN CALCIUM 10 MG: 10 TABLET, FILM COATED ORAL at 08:05

## 2022-01-01 RX ADMIN — CARVEDILOL 12.5 MG: 12.5 TABLET, FILM COATED ORAL at 08:05

## 2022-01-01 RX ADMIN — EPINEPHRINE 1 MG: 0.1 INJECTION, SOLUTION ENDOTRACHEAL; INTRACARDIAC; INTRAVENOUS at 11:10

## 2022-01-01 RX ADMIN — HYDROXYZINE HYDROCHLORIDE 25 MG: 25 TABLET, FILM COATED ORAL at 08:15

## 2022-01-01 RX ADMIN — CARVEDILOL 12.5 MG: 12.5 TABLET, FILM COATED ORAL at 11:46

## 2022-01-01 RX ADMIN — NICOTINE 1 PATCH: 21 PATCH, EXTENDED RELEASE TRANSDERMAL at 08:15

## 2022-01-01 RX ADMIN — PANTOPRAZOLE SODIUM 40 MG: 40 TABLET, DELAYED RELEASE ORAL at 08:06

## 2022-01-01 RX ADMIN — DIAZEPAM 5 MG: 5 TABLET ORAL at 21:20

## 2022-01-01 RX ADMIN — LEVOTHYROXINE SODIUM 50 MCG: 50 TABLET ORAL at 08:04

## 2022-01-01 RX ADMIN — FOLIC ACID 1 MG: 1 TABLET ORAL at 15:00

## 2022-01-01 RX ADMIN — Medication 1 TABLET: at 11:30

## 2022-01-01 RX ADMIN — TRAZODONE HYDROCHLORIDE 50 MG: 50 TABLET ORAL at 21:13

## 2022-01-01 RX ADMIN — DOCUSATE SODIUM 226 ML: 50 LIQUID ORAL at 11:56

## 2022-01-01 RX ADMIN — POLYETHYLENE GLYCOL 3350 17 G: 17 POWDER, FOR SOLUTION ORAL at 08:06

## 2022-01-01 RX ADMIN — FOLIC ACID 1 MG: 1 TABLET ORAL at 08:04

## 2022-01-01 RX ADMIN — DOCUSATE SODIUM 226 ML: 50 LIQUID ORAL at 09:41

## 2022-01-01 RX ADMIN — EPINEPHRINE 1 MG: 0.1 INJECTION, SOLUTION ENDOTRACHEAL; INTRACARDIAC; INTRAVENOUS at 11:13

## 2022-01-01 RX ADMIN — OLANZAPINE 5 MG: 5 TABLET, FILM COATED ORAL at 19:16

## 2022-01-01 RX ADMIN — WHITE PETROLATUM: 1.75 OINTMENT TOPICAL at 08:09

## 2022-01-01 RX ADMIN — POLYETHYLENE GLYCOL 3350 17 G: 17 POWDER, FOR SOLUTION ORAL at 11:48

## 2022-01-01 RX ADMIN — CARVEDILOL 12.5 MG: 12.5 TABLET, FILM COATED ORAL at 17:25

## 2022-01-01 RX ADMIN — NORETHINDRONE 0.35 MG: 0.35 TABLET ORAL at 11:47

## 2022-01-01 ASSESSMENT — PATIENT HEALTH QUESTIONNAIRE - PHQ9
SUM OF ALL RESPONSES TO PHQ QUESTIONS 1-9: 15
SUM OF ALL RESPONSES TO PHQ QUESTIONS 1-9: 8
SUM OF ALL RESPONSES TO PHQ QUESTIONS 1-9: 16
SUM OF ALL RESPONSES TO PHQ QUESTIONS 1-9: 15
SUM OF ALL RESPONSES TO PHQ QUESTIONS 1-9: 16
SUM OF ALL RESPONSES TO PHQ QUESTIONS 1-9: 8
SUM OF ALL RESPONSES TO PHQ QUESTIONS 1-9: 24
10. IF YOU CHECKED OFF ANY PROBLEMS, HOW DIFFICULT HAVE THESE PROBLEMS MADE IT FOR YOU TO DO YOUR WORK, TAKE CARE OF THINGS AT HOME, OR GET ALONG WITH OTHER PEOPLE: SOMEWHAT DIFFICULT
SUM OF ALL RESPONSES TO PHQ QUESTIONS 1-9: 24
10. IF YOU CHECKED OFF ANY PROBLEMS, HOW DIFFICULT HAVE THESE PROBLEMS MADE IT FOR YOU TO DO YOUR WORK, TAKE CARE OF THINGS AT HOME, OR GET ALONG WITH OTHER PEOPLE: VERY DIFFICULT
10. IF YOU CHECKED OFF ANY PROBLEMS, HOW DIFFICULT HAVE THESE PROBLEMS MADE IT FOR YOU TO DO YOUR WORK, TAKE CARE OF THINGS AT HOME, OR GET ALONG WITH OTHER PEOPLE: EXTREMELY DIFFICULT
10. IF YOU CHECKED OFF ANY PROBLEMS, HOW DIFFICULT HAVE THESE PROBLEMS MADE IT FOR YOU TO DO YOUR WORK, TAKE CARE OF THINGS AT HOME, OR GET ALONG WITH OTHER PEOPLE: VERY DIFFICULT

## 2022-01-01 ASSESSMENT — ANXIETY QUESTIONNAIRES
4. TROUBLE RELAXING: SEVERAL DAYS
7. FEELING AFRAID AS IF SOMETHING AWFUL MIGHT HAPPEN: NEARLY EVERY DAY
IF YOU CHECKED OFF ANY PROBLEMS ON THIS QUESTIONNAIRE, HOW DIFFICULT HAVE THESE PROBLEMS MADE IT FOR YOU TO DO YOUR WORK, TAKE CARE OF THINGS AT HOME, OR GET ALONG WITH OTHER PEOPLE: EXTREMELY DIFFICULT
3. WORRYING TOO MUCH ABOUT DIFFERENT THINGS: NEARLY EVERY DAY
1. FEELING NERVOUS, ANXIOUS, OR ON EDGE: NEARLY EVERY DAY
7. FEELING AFRAID AS IF SOMETHING AWFUL MIGHT HAPPEN: NEARLY EVERY DAY
GAD7 TOTAL SCORE: 15
GAD7 TOTAL SCORE: 15
GAD7 TOTAL SCORE: 14
IF YOU CHECKED OFF ANY PROBLEMS ON THIS QUESTIONNAIRE, HOW DIFFICULT HAVE THESE PROBLEMS MADE IT FOR YOU TO DO YOUR WORK, TAKE CARE OF THINGS AT HOME, OR GET ALONG WITH OTHER PEOPLE: VERY DIFFICULT
GAD7 TOTAL SCORE: 15
GAD7 TOTAL SCORE: 18
7. FEELING AFRAID AS IF SOMETHING AWFUL MIGHT HAPPEN: NEARLY EVERY DAY
7. FEELING AFRAID AS IF SOMETHING AWFUL MIGHT HAPPEN: NEARLY EVERY DAY
8. IF YOU CHECKED OFF ANY PROBLEMS, HOW DIFFICULT HAVE THESE MADE IT FOR YOU TO DO YOUR WORK, TAKE CARE OF THINGS AT HOME, OR GET ALONG WITH OTHER PEOPLE?: VERY DIFFICULT
GAD7 TOTAL SCORE: 18
8. IF YOU CHECKED OFF ANY PROBLEMS, HOW DIFFICULT HAVE THESE MADE IT FOR YOU TO DO YOUR WORK, TAKE CARE OF THINGS AT HOME, OR GET ALONG WITH OTHER PEOPLE?: VERY DIFFICULT
5. BEING SO RESTLESS THAT IT IS HARD TO SIT STILL: NOT AT ALL
IF YOU CHECKED OFF ANY PROBLEMS ON THIS QUESTIONNAIRE, HOW DIFFICULT HAVE THESE PROBLEMS MADE IT FOR YOU TO DO YOUR WORK, TAKE CARE OF THINGS AT HOME, OR GET ALONG WITH OTHER PEOPLE: VERY DIFFICULT
6. BECOMING EASILY ANNOYED OR IRRITABLE: SEVERAL DAYS
2. NOT BEING ABLE TO STOP OR CONTROL WORRYING: NEARLY EVERY DAY
1. FEELING NERVOUS, ANXIOUS, OR ON EDGE: NEARLY EVERY DAY
3. WORRYING TOO MUCH ABOUT DIFFERENT THINGS: NEARLY EVERY DAY
2. NOT BEING ABLE TO STOP OR CONTROL WORRYING: NEARLY EVERY DAY
7. FEELING AFRAID AS IF SOMETHING AWFUL MIGHT HAPPEN: NEARLY EVERY DAY
2. NOT BEING ABLE TO STOP OR CONTROL WORRYING: NEARLY EVERY DAY
8. IF YOU CHECKED OFF ANY PROBLEMS, HOW DIFFICULT HAVE THESE MADE IT FOR YOU TO DO YOUR WORK, TAKE CARE OF THINGS AT HOME, OR GET ALONG WITH OTHER PEOPLE?: EXTREMELY DIFFICULT
3. WORRYING TOO MUCH ABOUT DIFFERENT THINGS: NEARLY EVERY DAY
GAD7 TOTAL SCORE: 18
5. BEING SO RESTLESS THAT IT IS HARD TO SIT STILL: NOT AT ALL
6. BECOMING EASILY ANNOYED OR IRRITABLE: NOT AT ALL
3. WORRYING TOO MUCH ABOUT DIFFERENT THINGS: NEARLY EVERY DAY
7. FEELING AFRAID AS IF SOMETHING AWFUL MIGHT HAPPEN: NEARLY EVERY DAY
5. BEING SO RESTLESS THAT IT IS HARD TO SIT STILL: NOT AT ALL
7. FEELING AFRAID AS IF SOMETHING AWFUL MIGHT HAPPEN: NEARLY EVERY DAY
1. FEELING NERVOUS, ANXIOUS, OR ON EDGE: NEARLY EVERY DAY
4. TROUBLE RELAXING: NEARLY EVERY DAY
6. BECOMING EASILY ANNOYED OR IRRITABLE: NOT AT ALL
7. FEELING AFRAID AS IF SOMETHING AWFUL MIGHT HAPPEN: NEARLY EVERY DAY
4. TROUBLE RELAXING: NEARLY EVERY DAY
GAD7 TOTAL SCORE: 14
6. BECOMING EASILY ANNOYED OR IRRITABLE: NOT AT ALL
8. IF YOU CHECKED OFF ANY PROBLEMS, HOW DIFFICULT HAVE THESE MADE IT FOR YOU TO DO YOUR WORK, TAKE CARE OF THINGS AT HOME, OR GET ALONG WITH OTHER PEOPLE?: SOMEWHAT DIFFICULT
GAD7 TOTAL SCORE: 14
GAD7 TOTAL SCORE: 15
4. TROUBLE RELAXING: NEARLY EVERY DAY
1. FEELING NERVOUS, ANXIOUS, OR ON EDGE: NEARLY EVERY DAY
GAD7 TOTAL SCORE: 15
GAD7 TOTAL SCORE: 15
5. BEING SO RESTLESS THAT IT IS HARD TO SIT STILL: NEARLY EVERY DAY
2. NOT BEING ABLE TO STOP OR CONTROL WORRYING: NEARLY EVERY DAY

## 2022-01-01 ASSESSMENT — ENCOUNTER SYMPTOMS
HEADACHES: 0
CHILLS: 0
DYSPHORIC MOOD: 1
HEARTBURN: 0
ABDOMINAL PAIN: 0
DYSURIA: 0
WEAKNESS: 0
HEMATURIA: 0
VOMITING: 0
NERVOUS/ANXIOUS: 1
FEVER: 0
DIZZINESS: 0
FEVER: 0
FREQUENCY: 1
COUGH: 0
WOUND: 0
JOINT SWELLING: 0
HALLUCINATIONS: 0
BACK PAIN: 0
PARESTHESIAS: 0
PALPITATIONS: 0
NERVOUS/ANXIOUS: 0
ABDOMINAL PAIN: 1
CONSTIPATION: 1
SORE THROAT: 0
MYALGIAS: 0
NERVOUS/ANXIOUS: 1
DIFFICULTY URINATING: 0
DIARRHEA: 0
AGITATION: 0
BREAST MASS: 0
ARTHRALGIAS: 0
HEADACHES: 1
DECREASED CONCENTRATION: 0
FEVER: 0
SHORTNESS OF BREATH: 0
EYE PAIN: 0
CONSTIPATION: 0
SHORTNESS OF BREATH: 0
ABDOMINAL PAIN: 0
CHILLS: 0
TREMORS: 1
NAUSEA: 0
NECK PAIN: 0
NAUSEA: 0
HEMATOCHEZIA: 0
VOMITING: 0

## 2022-01-01 ASSESSMENT — ACTIVITIES OF DAILY LIVING (ADL)
TOILETING_ISSUES: NO
WEAR_GLASSES_OR_BLIND: NO
ADLS_ACUITY_SCORE: 19.5
DRESS: INDEPENDENT
ADLS_ACUITY_SCORE: 19.5
DRESS: INDEPENDENT
ADLS_ACUITY_SCORE: 19.5
FALL_HISTORY_WITHIN_LAST_SIX_MONTHS: NO
ADLS_ACUITY_SCORE: 19.5
DRESSING/BATHING_DIFFICULTY: NO
ADLS_ACUITY_SCORE: 19.5
CHANGE_IN_FUNCTIONAL_STATUS_SINCE_ONSET_OF_CURRENT_ILLNESS/INJURY: NO
DOING_ERRANDS_INDEPENDENTLY_DIFFICULTY: NO
LAUNDRY: UNABLE TO COMPLETE
ADLS_ACUITY_SCORE: 19.5
ADLS_ACUITY_SCORE: 18.25
CURRENT_FUNCTION: NO ASSISTANCE NEEDED
ADLS_ACUITY_SCORE: 19.5
HYGIENE/GROOMING: INDEPENDENT
LAUNDRY: UNABLE TO COMPLETE
ADLS_ACUITY_SCORE: 18.25
ADLS_ACUITY_SCORE: 19.5
ADLS_ACUITY_SCORE: 19.5
ORAL_HYGIENE: INDEPENDENT
HYGIENE/GROOMING: INDEPENDENT
ADLS_ACUITY_SCORE: 19.5
ADLS_ACUITY_SCORE: 35
ADLS_ACUITY_SCORE: 19.5
DRESS: INDEPENDENT
ADLS_ACUITY_SCORE: 35
ADLS_ACUITY_SCORE: 19.5
ADLS_ACUITY_SCORE: 19.5
ORAL_HYGIENE: INDEPENDENT
ORAL_HYGIENE: INDEPENDENT
ADLS_ACUITY_SCORE: 19.5
ADLS_ACUITY_SCORE: 18.25
LAUNDRY: UNABLE TO COMPLETE
DIFFICULTY_EATING/SWALLOWING: NO
CONCENTRATING,_REMEMBERING_OR_MAKING_DECISIONS_DIFFICULTY: NO
ADLS_ACUITY_SCORE: 18.25
ADLS_ACUITY_SCORE: 19.5
ADLS_ACUITY_SCORE: 18.25
WALKING_OR_CLIMBING_STAIRS_DIFFICULTY: NO
ADLS_ACUITY_SCORE: 18.25
HYGIENE/GROOMING: INDEPENDENT
ADLS_ACUITY_SCORE: 19.5
ADLS_ACUITY_SCORE: 19.5

## 2022-01-01 ASSESSMENT — MIFFLIN-ST. JEOR: SCORE: 1214.06

## 2022-01-07 PROBLEM — K70.30 ALCOHOLIC CIRRHOSIS OF LIVER WITHOUT ASCITES (H): Status: ACTIVE | Noted: 2022-01-01

## 2022-01-07 NOTE — PROGRESS NOTES
Ana Laura is a 51 year old who is being evaluated via a billable video visit.      How would you like to obtain your AVS? MyChart  If the video visit is dropped, the invitation should be resent by: Text to cell phone: [phone]  Will anyone else be joining your video visit? No    Video Start Time: changed to phone visit, by patient's request     Assessment & Plan     Leukocytosis, unspecified type  Recheck CBC with diff, consider hematology   - CBC with platelets and differential; Future  - XR Chest 2 Views; Future    Cough  Assess CXR , smoking cessation   - CBC with platelets and differential; Future  - XR Chest 2 Views; Future    Bipolar I disorder (H)  Follow up with psychiatry     Alcoholic cirrhosis of liver without ascites (H)  Follow up with GI                See Patient Instructions    Return in about 4 weeks (around 2/4/2022) for Physical Exam.    Liborio Lincoln MD  [facility]    Arthur Du is a 51 year old who presents for the following health issues     HPI   Chief Complaint   Patient presents with     RECHECK     F/U          Assessed for follow up .     Has h/o liver cirrhosis. Related to ETOH. Has had recurrent pancreatitis. Follows with GI. No ascites.   Has had elevated WBC, has symptoms of cough, still smokes. No fever, chills. No UTI symptoms currently.   Has bipolar disorder, suicidal attempts in the past. Multiple hospitalizations for depression, pancreatitis.       Review of Systems   Constitutional, HEENT, cardiovascular, pulmonary, gi and gu systems are negative, except as otherwise noted.      Objective           Vitals:  No vitals were obtained today due to virtual visit.    Physical Exam   GENERAL: Healthy, alert and no distress  RESP: No audible wheeze, cough.    PSYCH: Mentation appears normal, affect normal/bright, judgement and insight intact, normal speech.    Lab on 01/03/2022   Component Date Value Ref Range Status     AFP tumor marker  01/03/2022 3.3  0.0 - 8.0 ug/L Final     WBC Count 01/03/2022 14.8* 4.0 - 11.0 10e3/uL Final     RBC Count 01/03/2022 5.06  3.80 - 5.20 10e6/uL Final     Hemoglobin 01/03/2022 15.8* 11.7 - 15.7 g/dL Final     Hematocrit 01/03/2022 47.1* 35.0 - 47.0 % Final     MCV 01/03/2022 93  78 - 100 fL Final     MCH 01/03/2022 31.2  26.5 - 33.0 pg Final     MCHC 01/03/2022 33.5  31.5 - 36.5 g/dL Final     RDW 01/03/2022 13.4  10.0 - 15.0 % Final     Platelet Count 01/03/2022 242  150 - 450 10e3/uL Final     Creatinine 01/03/2022 0.75  0.52 - 1.04 mg/dL Final     GFR Estimate 01/03/2022 >90  >60 mL/min/1.73m2 Final    Effective December 21, 2021 eGFRcr in adults is calculated using the 2021 CKD-EPI creatinine equation which includes age and gender (Star et al., NEJ, DOI: 10.1056/BQSCwj3287811)     Bilirubin Total 01/03/2022 0.4  0.2 - 1.3 mg/dL Final     Bilirubin Direct 01/03/2022 0.2  0.0 - 0.2 mg/dL Final     Protein Total 01/03/2022 8.3  6.8 - 8.8 g/dL Final     Albumin 01/03/2022 4.1  3.4 - 5.0 g/dL Final     Alkaline Phosphatase 01/03/2022 130  40 - 150 U/L Final     AST 01/03/2022 43  0 - 45 U/L Final     ALT 01/03/2022 38  0 - 50 U/L Final     INR 01/03/2022 1.08  0.85 - 1.15 Final           phone visit time 28 min

## 2022-01-10 NOTE — PROGRESS NOTES
Assessment & Plan     Hymenal remnant  - No Rx needed    Screen for STD (sexually transmitted disease)  - Pt wants full STD screening including HSV titer.  She realizes that HSV IgG will be present if she has ever had HSV in her life, and does not necessarily indicate recent infection.  - Wet prep - Clinic Collect  - NEISSERIA GONORRHOEA PCR  - CHLAMYDIA TRACHOMATIS PCR  - HIV Antigen Antibody Combo [[id number]]; Future  - Hepatitis B surface antigen [[id number]]; Future  - Hepatitis C antibody [[id number]]; Future  - Treponema Abs w Reflex to RPR and Titer [[id number]]; Future  - Herpes Simplex Virus 1 and 2 IgG; Future  - Herpes Simplex Virus 1 and 2 IgG                 No follow-ups on file.    Ryan Santillan MD  [facility] ANGELIQUE Du is a 51 year old who presents for the following health issues     Pt here because she noted a bump on her vulvar area near vaginal opening this morning in the shower.  No pain or tenderness in the area.  She has no abnl discharge.  She would like full STD screening.             Review of Systems         Objective    /60   Wt 59.3 kg (130 lb 11.2 oz)   BMI 21.10 kg/m    Body mass index is 21.1 kg/m .  Physical Exam   Pelvic- Exam chaperoned by nurse, External genitalia normal, In the area of her posterior introitus on her right side where she indicated she had the bump there is a 4 mm hymenal remnant, Bartholin's glands normal, [employer]'s glands normal, Urethral meatus normal, Urethra normal, Vagina with normal rugae, no abnormal lesions, no abnormal discharge, Normal cervix without lesions or mucopus, no cervical motion tenderness, GC/Chlam probe done, Wet prep was Done

## 2022-01-12 NOTE — TELEPHONE ENCOUNTER
Patient is calling about her eczema on her finger. She says she has had it for a long time but it is much worse lately. She has tried some topical things including clobetasol and other OTC stuff. She is wondering if there is a oral medication she can take. She does not want to take a long term steroid if possible.

## 2022-01-12 NOTE — TELEPHONE ENCOUNTER
Call to patient. Patient informed of Dr. Lincoln's response below. Patient verbalized understanding.     Luann WALLS RN   [facility]

## 2022-01-19 PROBLEM — J96.01 ACUTE RESPIRATORY FAILURE WITH HYPOXIA (H): Status: RESOLVED | Noted: 2017-12-17 | Resolved: 2022-01-01

## 2022-01-19 PROBLEM — F10.929 ALCOHOLIC INTOXICATION WITH COMPLICATION (H): Status: RESOLVED | Noted: 2017-12-17 | Resolved: 2022-01-01

## 2022-01-19 PROBLEM — R33.9 URINE RETENTION: Status: ACTIVE | Noted: 2022-01-01

## 2022-01-19 PROBLEM — F10.930 ALCOHOL WITHDRAWAL SYNDROME WITHOUT COMPLICATION (H): Status: RESOLVED | Noted: 2020-11-28 | Resolved: 2022-01-01

## 2022-01-19 PROBLEM — I48.0 PAROXYSMAL ATRIAL FIBRILLATION (H): Status: RESOLVED | Noted: 2017-12-17 | Resolved: 2022-01-01

## 2022-01-19 PROBLEM — J43.9 PULMONARY EMPHYSEMA, UNSPECIFIED EMPHYSEMA TYPE (H): Status: ACTIVE | Noted: 2022-01-01

## 2022-01-19 PROBLEM — J81.0 ACUTE PULMONARY EDEMA (H): Status: RESOLVED | Noted: 2017-12-17 | Resolved: 2022-01-01

## 2022-01-19 NOTE — LETTER
January 20, 2022      Ana Laura Wharton  [address]   [facility] [address]        Dear Ana Laura,    Normal result reviewed        Sincerely,      Liborio Lincoln MD      Results for orders placed or performed in visit on 01/19/22   Lipid panel reflex to direct LDL Fasting     Status: Abnormal   Result Value Ref Range    Cholesterol 163 <200 mg/dL    Triglycerides 104 <150 mg/dL    Direct Measure HDL 43 (L) >=50 mg/dL    LDL Cholesterol Calculated 99 <=100 mg/dL    Non HDL Cholesterol 120 <130 mg/dL    Patient Fasting > 8hrs? Yes     Narrative    Cholesterol  Desirable:  <200 mg/dL    Triglycerides  Normal:  Less than 150 mg/dL  Borderline High:  150-199 mg/dL  High:  200-499 mg/dL  Very High:  Greater than or equal to 500 mg/dL    Direct Measure HDL  Female:  Greater than or equal to 50 mg/dL   Male:  Greater than or equal to 40 mg/dL    LDL Cholesterol  Desirable:  <100mg/dL  Above Desirable:  100-129 mg/dL   Borderline High:  130-159 mg/dL   High:  160-189 mg/dL   Very High:  >= 190 mg/dL    Non HDL Cholesterol  Desirable:  130 mg/dL  Above Desirable:  130-159 mg/dL  Borderline High:  160-189 mg/dL  High:  190-219 mg/dL  Very High:  Greater than or equal to 220 mg/dL   TSH with free T4 reflex     Status: Normal   Result Value Ref Range    TSH 2.21 0.40 - 4.00 mU/L

## 2022-01-19 NOTE — PROGRESS NOTES
"   SUBJECTIVE:   CC: Ana Laura Wharton is an 51 year old woman who presents for preventive health visit.       Patient has been advised of split billing requirements and indicates understanding: Yes  Healthy Habits:     In general, how would you rate your overall health?  Good    Frequency of exercise:  4-5 days/week    Duration of exercise:  Greater than 60 minutes    Do you usually eat at least 4 servings of fruit and vegetables a day, include whole grains    & fiber and avoid regularly eating high fat or \"junk\" foods?  Yes    Taking medications regularly:  Yes    Medication side effects:  Other    Ability to successfully perform activities of daily living:  No assistance needed    Home Safety:  No safety concerns identified    Hearing Impairment:  No hearing concerns    In the past 6 months, have you been bothered by leaking of urine?  No    In general, how would you rate your overall mental or emotional health?  Good      PHQ-2 Total Score: 2    Additional concerns today:  No          PROBLEMS TO ADD ON...  No acute complaints, no medication change or new medical conditions.  Has h/o HTN. on medical treatment. BP has been controlled. No side effects from medications. No CP, HA, dizziness. good compliance with medications and low salt diet.  Has H/O hyperlipidemia. On medical treatment and diet. No side effects. No muscle weakness, myalgias or upset stomach.   Has history of hypothyroidism. On replacement treatment with Synthroid. No heat /cold intolerance, heart palpitations, weight loss/ gain ,  change in bowel habits.  Has h/o GERD on PPI treatment. symptoms are controlled. No nausea, vomiting, heartburns, bloating.  Has h/o bipolar disorder. Follows with psychiatry. No suicidal ideation.   Has h/o smoking, chronic cough, has had chest CT. Findings of COPD changes.   Has h/o recurrent UTIs, seen urology, has had urodynamic studies, finding of urine retention. Self catheterizes now, no recent UTI. On preventive " Macrobid treatment.     Today's PHQ-2 Score:   PHQ-2 ( 1999 Pf[employer]) 1/19/2022   Q1: Little interest or pleasure in doing things 1   Q2: Feeling down, depressed or hopeless 1   PHQ-2 Score 2   PHQ-2 Total Score (12-17 Years)- Positive if 3 or more points; Administer PHQ-A if positive -   Q1: Little interest or pleasure in doing things Several days   Q2: Feeling down, depressed or hopeless Several days   PHQ-2 Score 2   Some encounter information is confidential and restricted. Go to Review Flowsheets activity to see all data.       Abuse: Current or Past (Physical, Sexual or Emotional) - No  Do you feel safe in your environment? Yes        Social History     Tobacco Use     Smoking status: Current Every Day Smoker     Packs/day: 1.00     Types: Cigarettes     Smokeless tobacco: Never Used     Tobacco comment: pt states she is quiting and declined resources   Substance Use Topics     Alcohol use: Not Currently     Alcohol/week: 2.0 standard drinks     Types: 2 Glasses of wine per week     Comment: None     If you drink alcohol do you typically have >3 drinks per day or >7 drinks per week? No    Alcohol Use 1/19/2022   Prescreen: >3 drinks/day or >7 drinks/week? No   Prescreen: >3 drinks/day or >7 drinks/week? -       Reviewed orders with patient.  Reviewed health maintenance and updated orders accordingly - Yes  Lab work is in process  Labs reviewed in EPIC    Breast Cancer Screening:    Breast CA Risk Assessment (FHS-7) 1/19/2022   Do you have a family history of breast, colon, or ovarian cancer? No / Unknown       click delete button to remove this line now  Mammogram Screening: Recommended annual mammography  Pertinent mammograms are reviewed under the imaging tab.    History of abnormal Pap smear: NO - age 30- 65 PAP every 3 years recommended  PAP / HPV Latest Ref Rng & Units 3/10/2021 2/19/2020 1/9/2019   PAP (Historical) - NIL NIL ASC-H(A)   HPV16 NEG:Negative Negative Negative Negative   HPV18 NEG:Negative  "Negative Negative Negative   HRHPV NEG:Negative Positive(A) Positive(A) Positive(A)     Reviewed and updated as needed this visit by clinical staff  Tobacco  Allergies  Meds   Med Hx  Surg Hx  Fam Hx  Soc Hx       Reviewed and updated as needed this visit by Provider                   Review of Systems   Constitutional: Negative for chills and fever.   HENT: Negative for congestion, ear pain, hearing loss and sore throat.    Eyes: Negative for pain and visual disturbance.   Respiratory: Negative for cough and shortness of breath.    Cardiovascular: Negative for chest pain, palpitations and peripheral edema.   Gastrointestinal: Negative for abdominal pain, constipation, diarrhea, heartburn, hematochezia and nausea.   Breasts:  Negative for breast mass and discharge.   Genitourinary: Positive for frequency. Negative for dysuria, genital sores, hematuria, pelvic pain, urgency, vaginal bleeding and vaginal discharge.   Musculoskeletal: Negative for arthralgias, joint swelling and myalgias.   Skin: Negative for rash.   Neurological: Positive for headaches. Negative for dizziness, weakness and paresthesias.   Psychiatric/Behavioral: Positive for mood changes. The patient is nervous/anxious.           OBJECTIVE:   /85 (BP Location: Right arm, Patient Position: Sitting, Cuff Size: Adult Regular)   Pulse 94   Temp 98  F (36.7  C) (Tympanic)   Ht 1.676 m (5' 6\")   Wt 58.2 kg (128 lb 6 oz)   LMP 01/13/2018   SpO2 95%   BMI 20.72 kg/m    Physical Exam  GENERAL: healthy, alert and no distress  EYES: Eyes grossly normal to inspection, PERRL and conjunctivae and sclerae normal  HENT: ear canals and TM's normal, nose and mouth without ulcers or lesions  NECK: no adenopathy, no asymmetry, masses, or scars and thyroid normal to palpation  RESP: lungs clear to auscultation - no rales, rhonchi or wheezes  CV: regular rate and rhythm, normal S1 S2, no S3 or S4, no murmur, click or rub, no peripheral edema and " "peripheral pulses strong  ABDOMEN: soft, nontender, no hepatosplenomegaly, no masses and bowel sounds normal  MS: no gross musculoskeletal defects noted, no edema  SKIN: no suspicious lesions or rashes  NEURO: Normal strength and tone, mentation intact and speech normal  PSYCH: mentation appears normal, affect normal/bright    Diagnostic Test Results:  Labs reviewed in Epic    ASSESSMENT/PLAN:       ICD-10-CM    1. Encounter for preventative adult health care examination  Z00.00 Lipid panel reflex to direct LDL Fasting     TSH with free T4 reflex   2. Acquired hypothyroidism  E03.9 levothyroxine (SYNTHROID/LEVOTHROID) 50 MCG tablet     TSH with free T4 reflex   3. Benign essential hypertension  I10 carvedilol (COREG) 12.5 MG tablet   4. Hyperlipidemia LDL goal <130  E78.5 atorvastatin (LIPITOR) 10 MG tablet     Lipid panel reflex to direct LDL Fasting   5. Gastroesophageal reflux disease without esophagitis  K21.9 pantoprazole (PROTONIX) 40 MG EC tablet   6. Bipolar I disorder (H)  F31.9 cyclobenzaprine (FLEXERIL) 10 MG tablet   7. Cough  R05.9 General PFT Lab (Please always keep checked)     Pulmonary Function Test       Patient has been advised of split billing requirements and indicates understanding: Yes    COUNSELING:  Reviewed preventive health counseling, as reflected in patient instructions       Regular exercise       Healthy diet/nutrition       Vision screening       Hearing screening    Estimated body mass index is 20.72 kg/m  as calculated from the following:    Height as of this encounter: 1.676 m (5' 6\").    Weight as of this encounter: 58.2 kg (128 lb 6 oz).        She reports that she has been smoking cigarettes. She has been smoking about 1.00 pack per day. She has never used smokeless tobacco.  Tobacco Cessation Action Plan:   Information offered: Patient not interested at this time      Counseling Resources:  ATP IV Guidelines  Pooled Cohorts Equation Calculator  Breast Cancer Risk " Calculator  BRCA-Related Cancer Risk Assessment: FHS-7 Tool  FRAX Risk Assessment  ICSI Preventive Guidelines  Dietary Guidelines for Americans, 2010  USDA's MyPlate  ASA Prophylaxis  Lung CA Screening    Liborio Lincoln MD  [facility]

## 2022-01-20 NOTE — TELEPHONE ENCOUNTER
Mel Burgos, [facility] HospitalSly Bell MD; Labossiere, Hattie, RN  Caller: Unspecified (Today, 10:23 AM)  Hi Hattie and Sly,     I sent Ana Laura some information on county financial assistance, and I also encouraged her to look into applying for social security disability, as it sounds like she would benefit from longer-term financial assistance (especially if it's not likely that she'll be able to work more).     I gave her my direct contact information in case she has other questions or needs help getting connected to the suggested resources.     Thanks!     Shanique

## 2022-01-20 NOTE — TELEPHONE ENCOUNTER
"Writer received a phone call from the patient. She was immediately tearful on the phone and stated, \"I'm really struggling.\"    Patient went onto share that she's feeling extremely anxious and depressed. She denies SI/SIB or any safety concerns and currently has a friend with her at her home.    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 This job is 14 hours/week. Patient said they cannot provide her with more hours and she's unable to pay her bills. With that being said, she feels she's not mentally stable enough to start another job. Patient is also dealing with medical concerns- recently diagnosed with emphysema, eczema, urology issues that requires her to cath herself, and cirrhosis.     Due to her current situation, writer suggested connecting her with social work to discuss what financial resources are available. She agreed with this plan.    Confirmed she's taking her medications as prescribed, included her PRNs for anxiety. Today, she took a PRN dose of Zyprexa 5 mg and hydroxyzine just before the phone call and is waiting for this to kick in. She confirmed she feels the PRNs and her entire med regimen is effective and denies needing any changes to her medications at this time.     At this time, she confirmed she'd like the update passed along to Dr. Andrews and would like a call from FELICIA.  "

## 2022-01-26 NOTE — TELEPHONE ENCOUNTER
"Pt would like to refill norethindrone andhave the Rx sent to the [facility] in [place]. Pt has annual scheduled with [facility] on 3/15.    Per pt she takes them \"continuously\" she does not take sugar pills. Pt also requests Mylan brand of norethindrone if possible, they were very cheap.  "

## 2022-02-21 NOTE — TELEPHONE ENCOUNTER
[facility] Call Center    Phone Message    May a detailed message be left on voicemail: yes     Reason for Call: Medication Refill Request    Has the patient contacted the pharmacy for the refill? Yes   Name of medication being requested: clonazapam 90 day supply  Provider who prescribed the medication:   Pharmacy: [facility]  Date medication is needed: Pt will be out by 3/14 but the pharmacy is mail order.         Action Taken: Other: [facility] psych pool    Travel Screening: Not Applicable

## 2022-02-21 NOTE — TELEPHONE ENCOUNTER
Last seen: 12/21  RTC: November  Cancel: none   No-show: none   Next appt: 3/1    Incoming refill from patient via phone      Disp Refills Start End ADELAIDA   clonazePAM (KLONOPIN) 1 MG tablet 360 tablet 0 12/14/2021  No   Sig - Route: Take 1 tablet (1 mg) by mouth 2 times daily AND 2 tablets (2 mg) At Bedtime. - Oral   Sent to pharmacy as: clonazePAM 1 MG Oral Tablet (klonoPIN)   Class: E-Prescribe   Order: [id number]   E-Prescribing Status: Receipt confirmed by pharmacy (12/14/2021 10:22 AM CST)   Per  last refilled: 12/15 #360, 9/24 #360, 7/5 #360    Will route to provider for approval

## 2022-02-25 NOTE — TELEPHONE ENCOUNTER
[facility] Call Center    Phone Message    May a detailed message be left on voicemail: yes     Reason for Call: Other: Pt returning call to nurse stating she's improved since taking her PRNs yesterday and will continue to take PRNs.     Action Taken: Other: Annika Talbot    Travel Screening: Not Applicable                                                                          .

## 2022-02-25 NOTE — TELEPHONE ENCOUNTER
Write returned a call to patient to obtain an update after connecting with a triage nurse on 2/24. No answer at number provided. LVM, requesting a call back. Clinic number provided.

## 2022-02-28 NOTE — TELEPHONE ENCOUNTER
- Second attempt made to reach out to patient to obtain an update on anxiety. Patient shared she is doing better. She is wanting to speak with Dr. Andrews directly at the appt on 3/1. She denies any safety concerns at this time. She asked this writer let the provider know that if she has trouble getting on for virtual appt to give her a call. Writer agreed to pass this along to provider.

## 2022-02-28 NOTE — TELEPHONE ENCOUNTER
Patient calling  She put two contacts in one eye and scratched her eye trying to remove one. She would like to see a  eye clinic to have her eye checked out. She stated her vision is getting blurry. Please advised. Ok to call and dontrell [phone]
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
## 2022-03-01 NOTE — PROGRESS NOTES
Ana Laura Wharton is a 51 year old who has consented to receive services via billable video visit.      Pt will join video visit via: [employer]  If there are problems joining the visit, send backup video invite via: Text to preferred phone: [phone]      Originating Location (patient location): Patient's home  Distant Location (provider location): [facility] [place] [facility]    Will anyone else be joining the video visit? No    How would you prefer to obtain AVS?: Cat

## 2022-03-01 NOTE — PATIENT INSTRUCTIONS
Continue your medications at the usual doses    Please return for follow-up on May 10    **For crisis resources, please see the information at the end of this document**   Patient Education    Thank you for coming to the [facility] [place] [facility].    Lab Testing:  If you had lab testing today and your results are reassuring or normal they will be mailed to you or sent through [employer] within 7 days. If the lab tests need quick action we will call you with the results. The phone number we will call with results is # [phone]. If this is not the best number please call our clinic and change the number.     Medication Refills:  If you need any refills please call your pharmacy and they will contact us. Our fax number for refills is [phone]. Please allow three business days for refill processing.   If you need to change to a different pharmacy, please contact the new pharmacy directly. The new pharmacy will help you get your medications transferred.     Contact Us:  Please call [phone] during business hours (8-5:00 M-F).  If you have medication related questions after clinic hours, or on the weekend, please call [phone].    Financial Assistance [phone]  Medical Records [phone]       MENTAL HEALTH CRISIS RESOURCES:  For a emergency help, please call 911 or go to the nearest Emergency Department.     Emergency Walk-In Options:   EmPATH Unit @ [facility] ([place]): [phone] - Specialized mental health emergency area designed to be calming  [facility] ([place]): [phone]  [facility] [place] [facility] ([place]): [phone]  [facility]): [phone]    County Crisis Information:   [place]: [phone]  [place]: [phone]  [place] ([place]) - Adult: [phone]     Child: [phone]  [place] - Adult: [phone]     Child: [phone]  [place]: [phone]  List of all [facility]  resources:   https://[place].gov/dhs/people-we-serve/adults/health-care/mental-health/resources/crisis-contacts.jsp    National Crisis Information:   Crisis Text Line: Text  [place]  to 887189  National Suicide Prevention Lifeline: 6-172-251-TALK (1-866.623.2030)       For online chat options, visit https://suicidepreventionlifeline.org/chat/  Poison Control Center: 5-965-610-5509  Trans Lifeline: 5-557-899-8839 - Hotline for transgender people of all ages  [employer]: 1-399-984-9623 - Hotline for LGBT youth     For Non-Emergency Support:   Fast Tracker: Mental Health & Substance Use Disorder Resources -   https://www.V-me Median.org/

## 2022-03-04 NOTE — TELEPHONE ENCOUNTER
Spoke to pt at 1655     Pt states eye symptoms times one week and was seen by [facility] earlier this week    Pt on eye drops and staying out of contact lenses until symptom resolve    apologized for delay with contacting pt after referral. Reviewed if ever having eye problem/symptoms or vision changes may call main clinic number to have message sent to triage.    Pt would like referring provider to know pt was seen    Note to Dr. Latonia Lares, RN 5:01 PM 03/04/22                Ana Laura Wharton S [phone]      Left message with direct number at [phone]    William Lares RN 2:08 PM 03/04/22        [facility] Call Center    Phone Message    May a detailed message be left on voicemail: no     Reason for Call: Appointment Intake    Referring Provider Name: PRECIOUS ANGUIANO  Diagnosis and/or Symptoms: Blurred vision and eye pain      Sending to clinic for review      Action Taken: Other: Eye    Travel Screening: Not Applicable

## 2022-03-21 NOTE — TELEPHONE ENCOUNTER
----- Message from Oriana White sent at 3/21/2022 10:59 AM CDT -----  Regarding: PT Med Refill Request  [facility] Call Center    Phone Message    May a detailed message be left on voicemail: yes     Reason for Call: Medication Refill Request    Has the patient contacted the pharmacy for the refill? Yes   Name of medication being requested: Vistaril 50 mg  Provider who prescribed the medication: Dr. Andrews  Pharmacy: [facility] [phone]  Date medication is needed: Has two weeks left of meds     Pt said pharmacy tried faxing us the request but it didn't go through and they asked her to call us (I'm guessing this occurred when our printer was temporarily out of service)--she needs the 90-day refill and said the pharmacy can be called to confirm.    Action Taken: Message routed to:  Other: [facility]    Travel Screening: Not Applicable

## 2022-03-21 NOTE — TELEPHONE ENCOUNTER
Received RF request from patient     Last seen: 3/1/22  RTC: November  Cancel: none  No-show: none  Next appt: 5/10/22     Medication requested: hydrOXYzine (VISTARIL) 50 MG capsule  Directions: Take 1 capsule (50 mg) by mouth 2 times daily. May also take 1 capsule (50 mg) daily as needed for anxiety  Qty: 270  Last Rx written 6/10/21 for 90 ds with 1 rf       Pended 90 ds with 1 refill. Routed to Dr. Andrews to sign off due to quantity requested.

## 2022-03-22 NOTE — TELEPHONE ENCOUNTER
Called mail order pharmacy and canceled rx. They are out of the medication so were unable to fill. Medication resent to requested pharmacy. Notified patient.

## 2022-03-22 NOTE — TELEPHONE ENCOUNTER
[facility] Call Center    Phone Message    May a detailed message be left on voicemail: yes     Reason for Call:   Patient called back and said that [facility] let her know that they will be discontinuing refills for her  hydrOXYzine (VISTARIL) 50 MG capsule. Patient requested that the medication gets called into a different pharmacy.    Pharmacy: [facility] [employer] in [place]  Address: [address] [place] [address] [address], [place] [address]    She said that she has a Good RX coupon for $17 that she also wants to use for the medication, but she's not sure if the RNs need anything else. She wanted RNs to also call in the coupon for her.    Patient requested call back to confirm that this information has been relayed. Okay to leave detailed voice message.    Action Taken: Other: P PSYCHIATRY NURSE-P    Travel Screening: Not Applicable

## 2022-03-28 NOTE — TELEPHONE ENCOUNTER
[facility] Call Center    Phone Message    May a detailed message be left on voicemail: yes     Reason for Call:   Patient called and said that when she reached out to the [facility] pharmacy, they did not have the refill on file. Patient is asking for clinic to resend the refill prescription for her Hydroxyzine to the pharmacy and to call her back for confirmation afterwards.    Action Taken: Other: P PSYCHIATRY NURSE-P    Travel Screening: Not Applicable

## 2022-03-28 NOTE — TELEPHONE ENCOUNTER
Writer called [facility] pharmacy in [place] to confirm that they received the prescription. Pharmacist stated that they did not receive it and have no record of it. They requested that writer give verbal orders for the med, writer agreed and confirmed #270 Hydroxyzine (vistaril) 50 mg capsules, no refills. Sig: Take 1 capsule (50 mg) by mouth 2 times daily. May also take 1 capsule (50 mg) daily as needed for anxiety. Patient notified via phone call.     Shantelle Ayala, EMT

## 2022-03-29 NOTE — NURSING NOTE
"Chief Complaint   Patient presents with     Vaginal Problem     patient reports she gets waxed monthly, and noticed an ingrown hair that is painful.        Initial /78   Wt 59.2 kg (130 lb 8 oz)   LMP 2018   BMI 21.06 kg/m   Estimated body mass index is 21.06 kg/m  as calculated from the following:    Height as of 22: 1.676 m (5' 6\").    Weight as of this encounter: 59.2 kg (130 lb 8 oz).  BP completed using cuff size: regular    Questioned patient about current smoking habits.  Pt. currently smokes.  Advised about smoking cessation.          The following HM Due: NONE      Yesi Finch CMA               "

## 2022-03-29 NOTE — PROGRESS NOTES
SUBJECTIVE:   CC: vulvar lesion                                               Ana Laura Wharton is a 51 year old  female who presents to clinic today for evaluation of vulvar lesion noted this morning, very painful. She had a bikini wax 3 weeks ago and developed discomfort with a palpable lesion on the right posterior vulva today. She desires I&D.    Problem list and histories reviewed & adjusted, as indicated.  Additional history: as documented.    OBJECTIVE:   /78   Wt 59.2 kg (130 lb 8 oz)   LMP 2018   BMI 21.06 kg/m     Const: sitting in chair in no acute distress, comfortable  Pulm: no increased work of breathing, no cough  Psych: mood stable, appropriate affect  Neuro: A+Ox3   : External genitalia normal well-estrogenized, healthy tissue.  No obvious excoriations, or rashes. On the right posterior vulva/lateral perineal area there is a 0.5cm area of erythema and inflammation, tender to palpation, soft, no drainage tract, no induration.     ASSESSMENT/PLAN:                                                    Ana Laura Wharton is a 51 year old female  here for evaluation and management of vulvar lesion consistent with folliculitis. Due to pain, she had hoped for incision and drainage. However, we discussed that with the size and recent onset of symptoms, conservative management is recommended. We discussed tub soaks 4-5x daily for the next 5 days vs wet, warm compresses. Offered topical lidocaine for discomfort, which she declined. Recommended NSAIDs and ice for comfort. Return in 5 days if ongoing lesion/discomfort, reports sooner for any signs of systemic infection including fever, chills, rigors.       Imani Grewal MD  Obstetrics and Gynecology   [facility]

## 2022-04-07 NOTE — TELEPHONE ENCOUNTER
I put future uauc orders in epic  KENDAL Smith CMA      ============================  [facility] Call Center    Phone Message    May a detailed message be left on voicemail: yes     Reason for Call: Order(s): Other:   Reason for requested: UA for possible UTI  Date needed: 4/7/22  Provider name: Dr. Morrison      Action Taken: Other: ua uro    Travel Screening: Not Applicable

## 2022-04-07 NOTE — TELEPHONE ENCOUNTER
Reason for Disposition    > 2 UTI's in last year    Additional Information    Negative: Shock suspected (e.g., cold/pale/clammy skin, too weak to stand, low BP, rapid pulse)    Negative: Sounds like a life-threatening emergency to the triager    Negative: Followed a genital area injury    Negative: Taking antibiotic for urinary tract infection (UTI)    Negative: Pregnant    Negative: Postpartum (from 0 to 6 weeks after delivery)    Negative: [1] Unable to urinate (or only a few drops) > 4 hours AND [2] bladder feels very full (e.g., palpable bladder or strong urge to urinate)    Negative: Vomiting    Negative: Patient sounds very sick or weak to the triager    Negative: [1] SEVERE pain with urination  (e.g., excruciating) AND [2] not improved after 2 hours of pain medicine and Sitz bath    Negative: Fever > 100.4 F (38.0 C)    Negative: Side (flank) or lower back pain present    Negative: Diabetes mellitus or weak immune system (e.g., HIV positive, cancer chemo, splenectomy, organ transplant, chronic steroids)    Negative: Bedridden (e.g., nursing home patient, CVA, chronic illness, recovering from surgery)    Negative: Artificial heart valve or artificial joint    Answer Assessment - Initial Assessment Questions  Patient calling to report having urinary frequency and pain for the past 2 days. Began with a foul odor and urine very concentrated/ cloudy. Consumed more water over the past day and seem to have become lighter and less odorous.    However, pain is present during void. Rates pain at 8-9/10.     Patient is requesting an order for UA/C&S to be placed...    Protocols used: URINATION PAIN - FEMALE-A-AH

## 2022-04-08 NOTE — TELEPHONE ENCOUNTER
"Received incoming phone call from pt who was immediately tearful on the phone and said she's very depressed and \"at a breaking point.\". She endorses symptoms of poor appetite, uncontrollable crying, low energy, and inability to complete tasks around the home including showering. Symptoms have been ongoing for about a week. The pt's stressors include ongoing conflict with ex-partner (going on for around 4 years), loneliness, and having to miss her weekly [facility] support group this week. 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 She took a dose of PRN olanzapine 5mg and hydroxyzine 50mg about an hour ago, which she feels is helping. She feels like her current medication regimen has been helpful overall, aside from this week. She's hesitant to consider making any type of medication change as she has been functioning relatively well overall. Is sleeping well without acute changes to sleep initiation or maintenance.    Explored the option of coming into the ED for further assessment. Pt resistant to this idea. Pt also declined to have [facility] involved as she doesn't want someone coming to her house, nor does she want to speak with them on the phone. Reviewed emergency contacts in pt's chart. Pt asked that I reach out to Devin and/or Donald to inform them that she is struggling and ask them to reach out to her. Pt has an appointment to have her taxes completed at 12:30p today and will be unavailable until about 4p. Okay to leave detailed VM.     Safety:  -The pt denies acute safety concerns, specifically denying SI or SIB urges/actions.  -Denies alcohol use or recent substance abuse. Does use marijuana infrequently.  -Lives at home alone, but does have several support systems in place. She reached out to a friend earlier this morning for additional support. She also met with her  this morning which was helpful.  -No access to firearms or weapons in " the home.  -She was instructed to contact 911 or present to the ED should acute safety concerns arise.      Follow-up:   -Spoke with Donald Wetzel, emergency contact. Informed him that pt is struggling and asked if he would be able to reach out to her, which he agreed to do immediately.  -Returned phone call to pt. She presented less tearful and confirmed she was able to speak with her friend which was helpful. She has plans to connect with Donald again later this afternoon. Pt aware that Dr. Andrews is out today.

## 2022-04-08 NOTE — TELEPHONE ENCOUNTER
----- Message -----  From: Sly Andrews MD  Sent: 4/8/2022  11:05 AM CDT  To: Izzy Newman MD, Iliana Mederos RN    Hi,    Thanks for the message Iliana and thanks Thi for the response.     I agree with all of the recommendations you both have made.  With respect to medications, she has found as needed olanzapine to be helpful in similar situations previously.  She is currently taking 25 mg at bedtime as a scheduled dose.  I will recommend she can use 5 mg 3 times daily as needed, along with hydroxyzine if she likes.    I am booked way out, do any of the residents have urgent slots to see her?  If they could see her on a Monday or Friday morning I could staff with them.  (I am away from April 19 the 16th.    DB    Follow-up:  -Spoke with pt and reviewed Dr. Andrews's recommendation to utilize olanzapine 5mg TID PRN along with hydroxyzine for symptom management. Pt expressed understanding. Pt unwilling to consider a visit to the [facility] unit as she states she has no intent of returning to [facility]. Pt open to seeing a resident with Dr. Andrews staffing. Pt feeling better after taking PRN and is resting.

## 2022-04-08 NOTE — TELEPHONE ENCOUNTER
Nurse Triage SBAR    Is this a 2nd Level Triage?  Yes,     Situation:     Pt crying on the phone and very depressed.    Background:    Pt taking Remeron 60 Mg's for depression.    But not working.  Pt in a dark place and crying a lot on the phone.     Assessment:   Pt reporting, feeling very sad, alone, isolated, lonely, by herself, misunderstood by society, increased depression, in a dark place and crying.    No appetite.   Drinking fluids and is able to sleep.    No nightmares or hallucinations noted.    Pt's group that she meet with a couple of times a week.  Has transitioned into go back to the office for meetings at night.  Pt is unable to attend since she has bad eye sight.   So feels completely alone.      Pt is not suicidal and does not feel like she will hurt herself.     And refused the ER for assistance.       Pt is taking Remeron 60 Mg's a day.    Seemed to be helping.  But lately.  It does not seem to helping with the depression at all.  Pt wondering if there is something else she can take for her depression?  Or what else an be done.   While I was on the phone with her Pt took 50 Mg's of Vistaril and 5 Mg's of Zyprexa for emergency use for the symptoms she was having.    Pt was crying a lot on the phone.   Stating she needed help as soon as possible.      I transferred the call the Nurse in the clinic for further assistance for Pt care.        Protocol Recommended Disposition:   Transferred call to the Nurse in the clinic for further assistance    Recommendation:  Transferred the call to the Nurse in the clinic for further assistance.     Transferred call to Nurse in the clinic for further assistance.     Echo Harris RN  Central Triage Red Flags/Med Refills      TIP  Providers, please consider if this condition is appropriate for management at one of our Acute and Diagnostic Services sites.     If patient is a good candidate, please use dotphrase <dot>triageresponse and select Refer to ADS to  document.  Reason for Disposition    Patient sounds very sick or weak to the triager    Additional Information    Negative: Patient attempted suicide    Negative: Patient is threatening suicide now    Negative: Violent behavior, or threatening to physically hurt or kill someone    Negative: Patient is very confused (disoriented, slurred speech) and no other adult (e.g., friend or family member) available    Negative: Difficult to awaken or acting very confused (disoriented, slurred speech) and new onset    Negative: Sounds like a life-threatening emergency to the triager    Negative: Alcohol use, abuse or dependence, question or problem related to    Negative: Drug abuse or dependence, question or problem related to    Negative: Suicide thoughts, threats, attempts, or questions    Negative: Anxiety is main problem or symptom    Negative: Depression and unable to do any of normal activities (e.g., self care, school, work; in comparison to baseline).    Negative: Very strange or confused behavior    Protocols used: DEPRESSION-A-OH

## 2022-04-11 NOTE — TELEPHONE ENCOUNTER
Medication requested: OLANZapine (ZYPREXA) 5 MG tablet  Directions: Take 1 tablet (5 mg) by mouth At Bedtime. May also take 1 tablet (5 mg) 2 times daily as needed (anxiety, mixed symptoms). - Oral  Qty: 270  Last refilled: 2/22/22      Medication requested: OLANZapine (ZYPREXA) 20 MG tablet  Directions: Take 1 tablet (20 mg) by mouth At Bedtime along with one 5 mg tablet for total bedtime dose of 25 mg - Oral  Qty: 90  Last refilled: 2/22/22

## 2022-04-13 NOTE — PROGRESS NOTES
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 for persistent MICHELLE 1    Path on LEEP specimen showed nor residual dysplasia    ECC 4/8/2021 had shown MICHELLE 1    Patient also requests I look at a vulvar lesion and consider I&D    REVIEW OF SYSTEMS:  neg    Health Maintenance   Topic Date Due     COPD ACTION PLAN  Never done     MIGRAINE ACTION PLAN  Never done     PHQ-9  07/10/2020     PAP FOLLOW-UP  04/29/2022     HPV FOLLOW-UP  04/29/2022     LUNG CANCER SCREENING  01/18/2023     MEDICARE ANNUAL WELLNESS VISIT  01/19/2023     MAMMO SCREENING  01/31/2023     ADVANCE CARE PLANNING  01/20/2026     LIPID  01/19/2027     DTAP/TDAP/TD IMMUNIZATION (6 - Td or Tdap) 07/22/2027     COLORECTAL CANCER SCREENING  03/03/2031     Pneumococcal Vaccine: Pediatrics (0 to 5 Years) and At-Risk Patients (6 to 64 Years) (2 of 2 - PPSV23) 10/14/2035     SPIROMETRY  Completed     HEPATITIS C SCREENING  Completed     HIV SCREENING  Completed     INFLUENZA VACCINE  Completed     ZOSTER IMMUNIZATION  Completed     HEPATITIS B IMMUNIZATION  Completed     COVID-19 Vaccine  Completed     IPV IMMUNIZATION  Aged Out     MENINGITIS IMMUNIZATION  Aged Out       Allergies   Allergen Reactions     Ciprofloxacin Other (See Comments)     Joint pain cdiff     Compazine [Prochlorperazine] Other (See Comments)     dystonia     Metoclopramide Other (See Comments)     \"I feel like I am crawling out of my skin\"     Reglan [Metoclopramide Hcl] Other (See Comments)     Sensation of \"crawling out of skin\"     Restoril [Temazepam]      Thorazine [Chlorpromazine] Other (See Comments)     dystonia     Abilify [Aripiprazole] Rash     Lamotrigine Rash     Severe drug rash - contraindication to receiving again. Skin peeling.        Objective:  Vitals: /68   Wt 59.9 kg (132 lb)   LMP 01/13/2018   BMI 21.31 kg/m    BMI= Body mass index is 21.31 kg/m .    EGBUS notable for a firm flat " slightly discolored vulvar lesion NOT c/w with an abscess presently.  There is a prominent hair within the lesion which I removed with tweezers.  This may have been the entry point for infection.    Vagina neg.  Cervix S/P LEEP with TZ visible.  Pap with cytobrush and EC brush obtained    Assessment/Plan:  1. OCP (oral contraceptive pills) initiation  Refill provided  - norethindrone (MICRONOR) 0.35 MG tablet; Take 1 tablet (0.35 mg) by mouth daily continuously  Dispense: 90 tablet; Refill: 3    2. Repeat Pap to follow up LEEP 4/2021 which showed no residual dysplasia in patient with persistent MICHELLE 1    Kevin Mcwilliams MD

## 2022-04-15 NOTE — TELEPHONE ENCOUNTER
Spoke with pt and reminded her of the appointment on Monday with Dr. England at 0915. Pt in agreement with a video visit.

## 2022-04-15 NOTE — TELEPHONE ENCOUNTER
[facility] Call Center    Phone Message    May a detailed message be left on voicemail: yes     Reason for Call: Other: Pt called and stated her olanzapine was sent to the wrong location. It should've been sent to [facility]- ph: [phone],  fax: [phone]. Prescription has been cancelled at [facility].     Action Taken: Message routed to:  Other: [facility] psych pool    Travel Screening: Not Applicable

## 2022-04-15 NOTE — TELEPHONE ENCOUNTER
Sly Andrews MD  You 15 hours ago (6:24 PM)     DB    Yes please, and thanks!   DB    Message text       You  Sly Andrews MD 2 days ago     RP    Hi Dr. Andrews     Iliana is out of clinic till Friday. I will be covering for her. I have pended an Olanzapine 5 mg #120 with 2 refills. Ok to fill?     Thanks,   Annika Ballesteros comment      - Olanzapine refilled per providers approval   - Med tab changed to reflect this   - Patient scheduled with Dr. England on 4/18 at 9:15 am

## 2022-04-15 NOTE — TELEPHONE ENCOUNTER
Writer discontinued order that was sent to [facility] and sent to [facility] instead. Patient notified via voicemail on mobile. Shantelle Ayala, EMT

## 2022-04-18 NOTE — PROGRESS NOTES
Ana Laura Wharton is a 51 year old who has consented to receive services via billable phone visit.      What phone number would you prefer to be contacted at?: Mobile phone number on file:   Telephone Information:   Mobile [phone]     How would you prefer to obtain AVS?: Cat MCCAULEY

## 2022-04-18 NOTE — PROGRESS NOTES
"Addendum:    Patient was discussed with Dr. Cantrell and Dr. Dong and following recommendations were made:  -Encourage gradual reduction of caffeine use  -Reduce mirtazapine to 45 mg nightly with a goal of improving affective instability  -Encourage reduction of clonazepam by 0.5 to 1 mg daily given risks of tolerance, sleep disturbance, dementia and other long-term risks of benzodiazepines.  -Change follow-up to 4 weeks, scheduling notified of this change  -Medication refill duration changed to 60 days given change in follow-up date  -Add \"rule out caffeine dependence\" to diagnosis list    Patient was contacted via telephone and did not respond.  A voicemail was left directing the patient to consult my chart for updated recommendations and follow-up interval was changed to 4 weeks.    Additionally past medication trial list was updated after further chart review as below.      PAST MED TRIALS       Medication Max Dose (mg) Dates / Duration Helpful? DC Reason / Adverse Effects?   sildenifil, tadalafil     N Lack of efficacy   temazepam     N Hallucinations, nightmares.    quetiapine   2017  N Severe weight gain   gabapentin     N Cognitive impairment, depression   trazodone     Y Initially worked, then lost efficacy   lithium     Y Discontinued due to thyroid problems and tremor   divalproex     N Cognitive impairment, depression   risperidone     N Worsened depression   lamotrigine     N Skin peeling acute reaction   aripiprazole     N Akathesia, possible rash   topiramate     N Visual problems,   bupropion     N     fluoxetine           paroxetine           sertraline     N Worsened mood   escitalopram     N     citalopram     N      venlafaxine     N     amitriptyline     N     nortripyline     N     alprazolam           lorazepam           zolpidem     N     eszopiclone     N     ramelton           clonazepam   current Y     olanzapine   current Y     hydroxyzine   current Y     mirtazapine   current Y   "    buproprion 200  2012   N Emotional blunting     buspirone  45 2019  N Worsening depression and SI   ziprazidone 160 [id number] Y Waning efficacy   lurasidone  2015               Marquis England MD
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
## 2022-04-18 NOTE — PATIENT INSTRUCTIONS
**For crisis resources, please see the information at the end of this document**   Patient Education    Thank you for coming to the [facility] [place] [facility].    Lab Testing:  If you had lab testing today and your results are reassuring or normal they will be mailed to you or sent through [employer] within 7 days. If the lab tests need quick action we will call you with the results. The phone number we will call with results is # [phone]. If this is not the best number please call our clinic and change the number.     Medication Refills:  If you need any refills please call your pharmacy and they will contact us. Our fax number for refills is [phone]. Please allow three business days for refill processing.   If you need to change to a different pharmacy, please contact the new pharmacy directly. The new pharmacy will help you get your medications transferred.     Contact Us:  Please call [phone] during business hours (8-5:00 M-F).  If you have medication related questions after clinic hours, or on the weekend, please call [phone].    Financial Assistance [phone]  Medical Records [phone]       MENTAL HEALTH CRISIS RESOURCES:  For a emergency help, please call 911 or go to the nearest Emergency Department.     Emergency Walk-In Options:   EmPATH Unit @ [facility] ([place]): [phone] - Specialized mental health emergency area designed to be calming  [facility] ([place]): [phone]  [facility] ([place]): [phone]  [facility]): [phone]    County Crisis Information:   [place]: [phone]  [place]: [phone]  [place] ([place]) - Adult: [phone]     Child: [phone]  [place] - Adult: [phone]     Child: [phone]  [place]: [phone]  List of all [facility] resources:    https://[place].gov/dhs/people-we-serve/adults/health-care/mental-health/resources/crisis-contacts.jsp    National Crisis Information:   Crisis Text Line: Text  [place]  to 205306  National Suicide Prevention Lifeline: 5-256-472-TALK (1-764.742.2268)       For online chat options, visit https://suicidepreventionlifeline.org/chat/  Poison Control Center: 1-879.894.1417  Trans Lifeline: 1-848.404.5825 - Hotline for transgender people of all ages  [employer]: 9-168-563-0619 - Hotline for LGBT youth     For Non-Emergency Support:   Fast Tracker: Mental Health & Substance Use Disorder Resources -   https://www.[employer].org/      Treatment Plan Today:     1) Medications-no medication changes, continue current medications as prescribed    2) Follow-up appt with Dr England 12 weeks    3) Crisis numbers are below and clinic after hours number is [phone]

## 2022-04-18 NOTE — PROGRESS NOTES
Ana Laura Wharton is a 51 year old who has consented to receive services via billable video visit.      Pt will join video visit via: [employer]  If there are problems joining the visit, send backup video invite via: Text to preferred phone: [phone]  {If patient encounters technical issues they should call [phone] :[phone]}    Originating Location (patient location): Patient's home  Distant Location (provider location): [facility]    Will anyone else be joining the video visit? No    How would you prefer to obtain AVS?: Cat Bradshaw VF

## 2022-04-19 NOTE — TELEPHONE ENCOUNTER
"[facility] Call Center     Phone Message     May a detailed message be left on voicemail: yes      Reason for Call:   Patient requested call back asap from Dr. England to discuss all the medication changes being made. Patient stated that \"there were too many changes.\"        Action Taken: Message routed to:  Other: ANTHONY WHITE     Travel Screening: Not Applicable                                                                 "

## 2022-04-19 NOTE — TELEPHONE ENCOUNTER
This writer called the patient after a callback request and discussed the following:    -We discussed medication changes during the visit including the reduction of clonazepam to 45 mg nightly.  -Ana Laura was encouraged to reduce clonazepam by 1/2 to 1 mg daily prior to her next visit and encouraged to set goals regarding reduction of caffeine use.  -We discussed potential side effects of her current medications, risks and benefits of her current doses of medications and impact of caffeine use on sleep  -Ana Laura agreed to a follow-up appointment in 4 to 6 weeks in person as well as to obtain an EKG during her next appointment given her multiple concurrent QT prolonging medications    Marquis England MD

## 2022-04-25 NOTE — ED TRIAGE NOTES
Pt states increasing depression with suicidal thoughts.  Pt denies having taken anything in attempt to take her life.  Pt also states she has been drinking today.

## 2022-04-25 NOTE — ED NOTES
Pt is requesting to leave, states that she is sober and she does not want to wait for hours to be seen. MD notified.

## 2022-04-25 NOTE — ED PROVIDER NOTES
"ED Provider Note  [facility]      History     Chief Complaint   Patient presents with     Suicidal     Alcohol Intoxication     HPI  Ana Laura Wharton is a 51 year old female with history of bipolar disorder, depressive disorder, alcohol abuse, presents to the ED for evaluation of worsening depression and alcohol use.  She states she drank 3-4 drinks earlier today.  States that she is sober now.  Initially expressed interest in alcohol detox.  She also states that she has waxing and waning depression and states she \"just had a bad day today\".  She participates in a group for her bipolar depression.  She states that she is \"medically unstable\", but feels fine now.  She often feels depressed but denies any suicidal thoughts or plan.  She denies any additional substance use.  Denies any medical complaints patient presents to the ED seeking alcohol detox and mental health assessment.    Past Medical History  Past Medical History:   Diagnosis Date     Abnormal Pap smear of cervix 01/09/2019    see problem list     Anxiety      Bipolar disorder (H)      C. difficile colitis      Cervical high risk HPV (human papillomavirus) test positive 01/09/2019 & 2/19/2020    see problem list     Depressive disorder      Essential hypertension 7/8/2015     Gastro-oesophageal reflux disease      History of colposcopy 03/03/2020    see problem list     Hypothyroidism 4/1/2015     Migraine      Pulmonary emphysema, unspecified emphysema type (H) 1/19/2022     Spontaneous pneumothorax     x3, resolved w/ pleurodesis      STD (sexually transmitted disease)      Suicide attempt (H)      Past Surgical History:   Procedure Laterality Date     ARTHROSCOPY KNEE      L knee     BLADDER SURGERY       CERVIX SURGERY      for pre-cancerous changes     COLONOSCOPY Left 3/3/2021    Procedure: COLONOSCOPY, WITH POLYPECTOMY USING COLD SNARE;  Surgeon: Duane Connell MD;  Location:  GI     left knee surgery       ORTHOPEDIC " "SURGERY      L shoulder     THORACIC SURGERY      for pneumothorax, pleurodesis and lobe resection     acetaminophen (TYLENOL) 500 MG tablet  albuterol (PROAIR HFA/PROVENTIL HFA/VENTOLIN HFA) 108 (90 Base) MCG/ACT inhaler  atorvastatin (LIPITOR) 10 MG tablet  carvedilol (COREG) 12.5 MG tablet  clindamycin (CLEOCIN T) 1 % external lotion  cyclobenzaprine (FLEXERIL) 10 MG tablet  folic acid (FOLVITE) 400 MCG tablet  hydrOXYzine (VISTARIL) 50 MG capsule  ketorolac (TORADOL) 30 MG/ML injection  levothyroxine (SYNTHROID/LEVOTHROID) 50 MCG tablet  mirtazapine (REMERON) 45 MG tablet  Multiple Vitamins-Minerals (WOMENS MULTI VITAMIN & MINERAL PO)  nitroFURantoin macrocrystal-monohydrate (MACROBID) 100 MG capsule  norethindrone (MICRONOR) 0.35 MG tablet  OLANZapine (ZYPREXA) 20 MG tablet  OLANZapine (ZYPREXA) 5 MG tablet  ondansetron (ZOFRAN) 4 MG tablet  pantoprazole (PROTONIX) 40 MG EC tablet  polyethylene glycol (MIRALAX) 17 g packet  Sennosides-Docusate Sodium (SENNA S PO)  SUMAtriptan (IMITREX) 25 MG tablet  clonazePAM (KLONOPIN) 1 MG tablet  Emollient (HYDROPHOR) OINT  Syringe/Needle, Disp, (SYRINGE LUER LOCK) 25G X 1\" 3 ML MISC      Allergies   Allergen Reactions     Ciprofloxacin Other (See Comments)     Joint pain cdiff     Compazine [Prochlorperazine] Other (See Comments)     dystonia     Metoclopramide Other (See Comments)     \"I feel like I am crawling out of my skin\"     Reglan [Metoclopramide Hcl] Other (See Comments)     Sensation of \"crawling out of skin\"     Restoril [Temazepam]      Thorazine [Chlorpromazine] Other (See Comments)     dystonia     Abilify [Aripiprazole] Rash     Lamotrigine Rash     Severe drug rash - contraindication to receiving again. Skin peeling.      Family History  Family History   Problem Relation Age of Onset     Depression Mother      Pacemaker Mother      Osteoporosis Mother      Lipids Father         hyperlipidemia     Macular Degeneration Father      No Known Problems Brother  "     No Known Problems Son      Colon Cancer No family hx of      Social History   Social History     Tobacco Use     Smoking status: Current Every Day Smoker     Packs/day: 1.00     Types: Cigarettes     Smokeless tobacco: Never Used     Tobacco comment: pt states she is quiting and declined resources   Substance Use Topics     Alcohol use: Yes     Alcohol/week: 2.0 standard drinks     Types: 2 Glasses of wine per week     Comment: sober for 17 months, relapsed over the weekend     Drug use: Yes     Frequency: 0.5 times per week     Types: Marijuana     Comment: occ      Past medical history, past surgical history, medications, allergies, family history, and social history were reviewed with the patient. No additional pertinent items.       Review of Systems   Constitutional: Negative for chills and fever.   Respiratory: Negative for shortness of breath.    Cardiovascular: Negative for chest pain.   Gastrointestinal: Negative for abdominal pain, nausea and vomiting.   Genitourinary: Negative for difficulty urinating.   Musculoskeletal: Negative for back pain and neck pain.   Skin: Negative for rash and wound.   Neurological: Negative for headaches.   Psychiatric/Behavioral: Positive for dysphoric mood. Negative for agitation, behavioral problems, decreased concentration, hallucinations and suicidal ideas. The patient is not nervous/anxious.      A complete review of systems was performed with pertinent positives and negatives noted in the HPI, and all other systems negative.    Physical Exam   BP: 120/82  Pulse: (!) 127  Temp: 98.3  F (36.8  C)  Resp: 18  SpO2: 100 %  Physical Exam  Vitals and nursing note reviewed.   Constitutional:       General: She is not in acute distress.     Appearance: Normal appearance.   HENT:      Head: Normocephalic.      Nose: Nose normal.   Eyes:      Pupils: Pupils are equal, round, and reactive to light.   Cardiovascular:      Rate and Rhythm: Normal rate and regular rhythm.    Pulmonary:      Effort: Pulmonary effort is normal.   Abdominal:      General: There is no distension.   Musculoskeletal:         General: No deformity. Normal range of motion.      Cervical back: Normal range of motion.   Skin:     General: Skin is warm.   Neurological:      Mental Status: She is alert and oriented to person, place, and time.   Psychiatric:         Attention and Perception: Attention normal.         Mood and Affect: Mood normal.         Speech: Speech normal.         Behavior: Behavior normal. Behavior is cooperative.         Thought Content: Thought content normal. Thought content is not paranoid or delusional. Thought content does not include homicidal or suicidal ideation. Thought content does not include homicidal or suicidal plan.         Cognition and Memory: Cognition normal.         Judgment: Judgment normal.         ED Course      Procedures       The medical record was reviewed and interpreted.  Mental Health Risk Assessment      PSS-3    Date and Time Over the past 2 weeks have you felt down, depressed, or hopeless? Over the past 2 weeks have you had thoughts of killing yourself? Have you ever attempted to kill yourself? When did this last happen? User   04/25/22 1609 yes yes yes between 1 and 6 months ago FWS      C-SSRS ([place])    Date and Time Q1 Wished to be Dead (Past Month) Q2 Suicidal Thoughts (Past Month) Q3 Suicidal Thought Method Q4 Suicidal Intent without Specific Plan Q5 Suicide Intent with Specific Plan Q6 Suicide Behavior (Lifetime) Within the Past 3 Months? RETIRED: Level of Risk per Screen Screening Not Complete User   04/25/22 1609 yes yes no no no yes -- -- -- FWS                Item Assessment   Suicidal Ideation None   Plan None   Intent None   Suicidal or self-harm behaviors None   Risk Factors Mixed affect episode (e.g. Bipolar)   Protective Factors Not suicidal, support group, close friends who advocate for safety              No results found for any visits  on 04/25/22.  Medications - No data to display     Assessments & Plan (with Medical Decision Making)   Patient presents to the ED seeking alcohol detox and mental health assessment.  On arrival, patient has anxious affect, tachycardic in the 120s.  Otherwise has normal vital signs.  She does not have any medical complaints.  Physical exam is unremarkable.    Patient was referred for detox and detox bed was held.  Planning for mental health assessment in the ED.    Prior to admission, patient states she would rather go.  She was reassessed by me.  She is now alert and oriented, ambulatory, and clinically sober.  She denies any homicidal/suicidal ideations.  Says she feels depressed often but this is not a new thing for her.  She has multiple outpatient resources including a support group for her bipolar depression and alcohol use.  She is no longer interested in detox.  She does not feel that she needs a mental health assessment.  She would like to be discharged.    As above, patient is clinically sober.  There is no criteria to hold her against her will.  Discharged in good condition with additional outpatient resources.    Pt is with her friend, Devin, who consents for her safety and will bring her home.        I have reviewed the nursing notes. I have reviewed the findings, diagnosis, plan and need for follow up with the patient.    New Prescriptions    No medications on file       Final diagnoses:   Alcoholic intoxication without complication (H)       --  Harpreet Mcguire DO  [facility] EMERGENCY DEPARTMENT  4/25/2022     Harpreet Mcguire DO  04/25/22 1125

## 2022-04-25 NOTE — DISCHARGE INSTRUCTIONS
Please make an appointment to follow up with your therapist and/or Psychiatric Clinic (phone: [phone]) within 1-3 days.     Return to the ED if you are having worsening thoughts/feelings of harming yourself or others, or any urgent/life-threatening concerns.     DISCHARGE RESOURCES:  -[facility] [place] [phone]   -[facility] Intake [phone] or [phone].  -Crisis Intervention: [phone] or [phone] (TTY: [phone]).  Call anytime.  -Suicide Awareness Voices of Education (SAVE) (www.save.org): 641-860-CBMI (2307)  -National Suicide Prevention Line (www.mentalhealthmn.org): 112-758-NSAZ (3195)  -National [place] on Mental Illness (www.[place].duane.org): 565.452.5536 or 350-920-5153.  -Rdfo7ggaw: text the word LIFE to 94128 for immediate support and crisis intervention  -Mental Health Consumer/Survivor Network of [place] (www.mhcsn.net): 532.229.1232 or 364-379-0521  -Mental Health Association of [place] (www.mentalhealth.org): 568.836.3321 or 072-507-1175          Please use the below resources and your primary care physician to safely cease alcohol and/or substance use.     Return to the ED if you are having any urgent/life-threatening concerns.     DISCHARGE RESOURCES:  -[facility]: [phone] (detox), [phone] (outpatient & [facility])  -[facility] ([address] [place] [address]): [phone]  -[facility]: ([phone]  -[place] [facility]: (131) [phone]  -SMART Recovery - self management for addiction recovery:  www.smartrecovery.org    -Pathways ~ A Health Crisis Resource & Support Center: 174.730.4125.  -[facility] [place] [phone]   -Substance Abuse and Mental Health Services (www.samhsa.gov)  -Harm Reduction Coalition (www. Harmreduction.org)  -[place] Opioid Prevention Coalition: www.opioidcoalition.org  -Poison control 9-462-948-0753       Sober Support Group Information:  AA/NA &  Sponsor/Support  -Alcoholics Anonymous (www.alcoholics-anonymous.org): for local information 24 hours/day  -AA Intergroup service office in [place] (http://www.aastpaul.org/) [phone]  -AA Intergroup service office in [facility]: [phone]. (http://www.a[place].org/)  -Narcotics Anonymous (www.nam[place].org) (211) 170-2533   -Sober Fun Activities: www.soberSeaside TherapeuticsactivitiesGeneExcel/[facility]//[facility] Recovery Connection ([facility])  [facility] connects people seeking recovery to resources that help foster and sustain long-term recovery.  Whether you are seeking resources for treatment, transportation, housing, job training, education, health care or other pathways to recovery, [facility] is a great place to start.   Phone: [phone]. www.minnesotaRebiotix.Ornicept (Great listing of all types of recovery and non-recovery related resources)

## 2022-04-28 NOTE — PATIENT INSTRUCTIONS
**For crisis resources, please see the information at the end of this document**   Patient Education    Thank you for coming to the [facility] [place] [facility].    Lab Testing:  If you had lab testing today and your results are reassuring or normal they will be mailed to you or sent through TRIRIGA within 7 days. If the lab tests need quick action we will call you with the results. The phone number we will call with results is # [phone]. If this is not the best number please call our clinic and change the number.     Medication Refills:  If you need any refills please call your pharmacy and they will contact us. Our fax number for refills is [phone]. Please allow three business days for refill processing.   If you need to change to a different pharmacy, please contact the new pharmacy directly. The new pharmacy will help you get your medications transferred.     Contact Us:  Please call [phone] during business hours (8-5:00 M-F).  If you have medication related questions after clinic hours, or on the weekend, please call [phone].    Financial Assistance [phone]  Medical Records [phone]       MENTAL HEALTH CRISIS RESOURCES:  For a emergency help, please call 911 or go to the nearest Emergency Department.     Emergency Walk-In Options:   EmPATH Unit @ [facility] ([place]): [phone] - Specialized mental health emergency area designed to be calming  [facility] ([place]): [phone]  [facility] ([place]): [phone]  [facility]): [phone]    County Crisis Information:   [place]: [phone]  [place]: [phone]  [place] ([place]) - Adult: [phone]     Child: [phone]  [place] - Adult: [phone]     Child: [phone]  [place]: [phone]  List of all [facility] resources:    https://[place].gov/dhs/people-we-serve/adults/health-care/mental-health/resources/crisis-contacts.jsp    National Crisis Information:   Crisis Text Line: Text  [place]  to 900323  National Suicide Prevention Lifeline: 0-112-045-TALK (1-830.385.9991)       For online chat options, visit https://suicidepreventionlifeline.org/chat/  Poison Control Center: 1-252.202.4193  Trans Lifeline: 1-688.835.1777 - Hotline for transgender people of all ages  The Rod Project: 4-710-052-6208 - Hotline for LGBT youth     For Non-Emergency Support:   Fast Tracker: Mental Health & Substance Use Disorder Resources -   https://www.PillPack.org/      Treatment Plan Today:     1) Medications-increase mirtazapine to 60 mg nightly, continue other medications as prescribed.  Recommend discontinuing alcohol use and resuming prescribed medications including utilizing as needed medications.    2) Follow-up appt with Dr England at 5 PM today for a phone call check-in, tomorrow morning for a phone call check-in.  Next clinic appointment is scheduled on 5/25/2022 for an in-person visit.    3) Crisis numbers are below and clinic after hours number is [phone].  Contact 911 or come to the emergency department if suicidal ideation worsens or he feels unsafe.

## 2022-04-28 NOTE — PROGRESS NOTES
Ana Laura Wharton is a 51 year old who has consented to receive services via billable phone visit.      What phone number would you prefer to be contacted at?: Mobile phone number on file:   Telephone Information:   Mobile [phone]     How would you prefer to obtain AVS?: Cat MCCAULEY

## 2022-04-28 NOTE — NURSING NOTE
Pt stated meds were just reviewed 4/25/22 and they are accurate.    Pt stated that they would like meds ordered in 90 day supply to [facility] mail service. When only a 30 day supply is ordered it is very expensive.    Ying Johnson VF
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
## 2022-04-29 NOTE — PROGRESS NOTES
This writer contacted the patient today via telephone for a check-in.  She had reported she had resumed drinking today and had consumed 2 drinks so far upon her call as well as resumed her prescribed medications.  Ana Laura denied suicidal ideation, self-harm thoughts or safety concerns.  Voluntary hospitalization, day treatment, and sober support resources were offered and she declined these stating she would attempt to stop drinking on her own.  Ana Laura articulated an appropriate safety plan including calling her friend Devin to take her to the emergency room if suicidal ideation or safety concerns worsened.     Subsequently the patient was contacted by this writer with Dr. Newman.  Ana Laura agreed to let her friend Devin participate in the call and agreed that Devin would take all excess supplies of medication from her home giving her only a weekend supply of her current medications.  Devin stated he would attempt to be in person with Ana Laura throughout the weekend and check on her regularly.  Ana Laura agreed with this plan, stated she had removed all alcohol from her home and plan to not consume alcohol going forward.  Ana Laura was recommended to reduce clonazepam use and declined this recommendation deciding to maintain her current medication regimen.  Ana Laura was educated regarding the risks associated with combining alcohol with her current medications, in particular clonazepam and was encouraged to reduce clonazepam use if resuming alcohol consumption.  She and agreed to a follow-up call by this writer on 5/2/2022 for a check-in.    Marquis England M.D.

## 2022-04-29 NOTE — TELEPHONE ENCOUNTER
Writer spoke with Dr. England, who stated he had followed up with the patient. No further instructions at this time.

## 2022-04-29 NOTE — TELEPHONE ENCOUNTER
Writer received 1 fax page from [employer] requesting verification on directions for mirtazapine (REMERON) 30 MG tablet. This encounter was routed to Dr. England. The form is being held in psychiatry. Gregory Bronson, EMT      Order updated with correct sig. New dose will be 60 mg at bedtime. Marquis England M.D.

## 2022-04-29 NOTE — TELEPHONE ENCOUNTER
----- Message from Leticia Mikaela sent at 4/29/2022  9:59 AM CDT -----  Regarding: pt requesting call back  Contact: [phone]  Hi Dr. England and Rns,    Pt stated that she wanted to discuss her medication changes and her drinking. She asked for a call back asap.    - Leticia    Follow Up:  Writer attempted to call patient back, but no answer. LVM to call the clinic back or writer would attempt another call later.

## 2022-05-09 NOTE — TELEPHONE ENCOUNTER
[facility] Call Center    Phone Message    May a detailed message be left on voicemail: yes     Reason for Call: Medication Refill Request    Has the patient contacted the pharmacy for the refill? Yes   Name of medication being requested: Clonazepam   Provider who prescribed the medication: Dr. England  Pharmacy:  [facility] - [facility] [address], [address]  Date medication is needed: Patient states that she needs it refilled before 5/23. Requesting 90 days of refill         Action Taken: Message routed to:  Other: P PSYCHIATRY NURSE [facility]    Travel Screening: Not Applicable

## 2022-05-09 NOTE — TELEPHONE ENCOUNTER
Clonazepam 1 Mg Tablet    Clinic received a patient call with a refill request for Clonazepam 1 mg. Per [place] , pt refilled aforementioned medication on 3/7/2022 for the equivalent of a 90 day supply= 360 tablets. Refill request will be denied d/t request being too early.     Writer attempted to contact pt via phone for a wellness check in and obtained no answer. Left a DVM requesting a call back at [phone].   Nina Gray RN

## 2022-05-10 NOTE — TELEPHONE ENCOUNTER
"LATE ENTRY!    Pt called writer back around 1625 on 5/9/22. Pt stated she didn't need medication refills but she was feeling \"very low\". Pt was crying. Writer provided active listening and reassurance.     With writer's prompting, pt went on to elaborate that she is feeling very depressed and disappointed in self d/t recent relapse. Pt believes the decrease in Remeron to 45 mg was the ultimate cause of the change in her mood leading to the relapse. Pt verbalized frustration at having a resident as a provider: \"I've had Bipolar since I was 16. I need a psychiatrist with experience, not a resident. No offense, but I don't know\". Writer explained that all residents are supervised/assisted by an experienced attending Psychiatrist who oversees/approves the treatment plan and intervenes if necessary. Pt seemed reassured after explanation.     Pt denied any safety concerns such as SI, self/other harm but endorsed \"a lot of anxiety and sadness\". Writer reviewed how pt has been utilizing Klonopin and Vistaril, and advised to intercalate medications as to provide coverage through out the day. Writer also reviewed other coping and self soothing mechanisms with pt. Pt stated she has a strong support system in case she has thoughts of self harm. Writer pointed out that pt can always call 911 to be taken to the ED if needed. Pt requested to be seen by provider soon. Writer scheduled pt with provider on 5/11 at 12:45.     Pt was appreciative of writer's help and was not distressed at the end of the call. Pt stated she will most certainly attend appt on 5/11. No safety concerns at this time.   Nina Gray RN    "

## 2022-05-10 NOTE — LETTER
May 10, 2022      RE: Ana Laura Wharton  [address]  [place] [facility] [place] [address]         To whom it may concern,     I am Ana Laura Wharton's current psychiatric provider. This letter is to inform that Ana Laura is unable to work during the period of 5/10/2022 to 05/14/2022 due to medical needs. Please exempt her from work requirements during this period.      For any questions of additional information please contact our clinic at [phone]      Sincerely,        Marquis England MD

## 2022-05-10 NOTE — TELEPHONE ENCOUNTER
Provider signed off on letter and patient updated. Copy of letter sent via [employer] to patient per her request.

## 2022-05-10 NOTE — TELEPHONE ENCOUNTER
Situation:  Patient states that she has been off of work for 2 weeks and tried to return yesterday. She states that she was there for 2 hours and had a panic attack and had to leave. Patient is looking to get a new letter to be off until Saturday. Patient has appointment with provider tomorrow but states that she needs to get the letter to her work today.     Assessment:   Mood: depressed and anxious  Anxiety/Depression symptoms:   anxiety symptoms Excessive worry and Panic attacks   depressive symptoms depressed mood, feelings of worthlessness, feelings of guilt, crying.     Safety Risk Assessment:  Denies thoughts of self harm, thoughts of harming others, and suicidal ideation.    Safety Plan:   People patient identifies they can call for help: (friends, family and/or professionals): Yes  Coping skills identified: Baths, showers, warming pad, music distraction. She states that she has a good support system and will call her friend to come be with her when he gets up this afternoon. Until then she will use her other skills to help.       Letter pended and sent to provider for review.

## 2022-05-11 NOTE — PATIENT INSTRUCTIONS
**For crisis resources, please see the information at the end of this document**   Patient Education    Thank you for coming to the [facility] [place] [facility].    Lab Testing:  If you had lab testing today and your results are reassuring or normal they will be mailed to you or sent through [employer] within 7 days. If the lab tests need quick action we will call you with the results. The phone number we will call with results is # [phone]. If this is not the best number please call our clinic and change the number.     Medication Refills:  If you need any refills please call your pharmacy and they will contact us. Our fax number for refills is [phone]. Please allow three business days for refill processing.   If you need to change to a different pharmacy, please contact the new pharmacy directly. The new pharmacy will help you get your medications transferred.     Contact Us:  Please call [phone] during business hours (8-5:00 M-F).  If you have medication related questions after clinic hours, or on the weekend, please call [phone].    Financial Assistance [phone]  Medical Records [phone]       MENTAL HEALTH CRISIS RESOURCES:  For a emergency help, please call 911 or go to the nearest Emergency Department.     Emergency Walk-In Options:   [facility] Unit @ [facility] ([place]): [phone] - Specialized mental health emergency area designed to be calming  [facility] ([place]): [phone]  [facility] ([place]): [phone]  [facility]): [phone]    County Crisis Information:   [place]: [phone]  [place]: [phone]  [place] ([place]) - Adult: [phone]     [place]: [phone]  [place] - Adult: [phone]     Child: [phone]  [place]: [phone]  List of all [facility] resources:    https://[place].gov/dhs/people-we-serve/adults/health-care/mental-health/resources/crisis-contacts.jsp    National Crisis Information:   Crisis Text Line: Text  [place]  to 635130  National Suicide Prevention Lifeline: 6-766-456-TALK (1-459.405.3675)       For online chat options, visit https://suicidepreventionlifeline.org/chat/  Poison Control Center: 1-476.166.6829  Trans Lifeline: 1-232.739.5585 - Hotline for transgender people of all ages  [employer]: 9-577-855-0854 - Hotline for LGBT youth     For Non-Emergency Support:   Fast Tracker: Mental Health & Substance Use Disorder Resources -   https://www.[employer].org/      Treatment Plan Today:     1) Medications-reduce morning dose of clonazepam to 1.75 mg on 6/6/2022, continue other medications as prescribed without changes    2) Follow-up appt with Dr England in 4 weeks    3) Crisis numbers are below and clinic after hours number is [phone]
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
## 2022-05-26 NOTE — TELEPHONE ENCOUNTER
[facility] Call Center    Phone Message    May a detailed message be left on voicemail: yes     Reason for Call: Other: Rep from [facility] called with the pt, stating the pt needs more than a 30 day supply of all of her medication. Pt's ins won't cover meds when they ae 30 days or less.      Action Taken: Other: [employer]    Travel Screening: Not Applicable

## 2022-05-26 NOTE — TELEPHONE ENCOUNTER
Medication requested:   clonazePAM (KLONOPIN) 1 MG tablet  clonazePAM (KLONOPIN) 0.5 MG tablet   Last refilled: 6/6/22  Qty: months supply      Last seen: 5/11/22  RTC: 4 weeks for an in person visit  Cancel: x 1 on 5/25/22  No-show: 0  Next appt: 6/22/22    Refill decision:   Refill pended and routed to the provider for review/determination due to   Pt called very upset because needs a 90 day supply of her clonazepam..  If she gets a 30 day supply she has a copay   A 90 day supply no copay    She also states she can not drive to the clinic for a follow-up visit on 6/22/22 because of road construction-she would like the appointment changed to a virtual or telephone visit

## 2022-05-26 NOTE — TELEPHONE ENCOUNTER
Received 1 page fax from [employer] requesting clarification and provider signature verifying that patient should be taking both prescribed doses on Klonopin. Writer verified that the listed prescription matched both med tab and plan.     One 1mg tablet TID plus One and a half 0.5mg tablets qday (total 3.75 mg daily).     Signed form received. Faxed to [employer] at [phone]. Form was sent to scanning and a hard copy is being held in nurse triage until scanning is confirmed. Shantelle Ayala, EMT

## 2022-05-27 NOTE — TELEPHONE ENCOUNTER
Called [facility] and spoke with Tangela. She said that hydroxyzine (whether Vistaril or Atarax) is considered a Tier 3 medication by patient's insurance, which is why she was charged a copay. A tier 3 medication has a copay of $131, but since patient has Medicare, she received a discount which is why her copay is $92.31. A tier reduction request can be made directly to patient's insurance plan. If approved, then a 90 ds will cost $0.    Olanzapine, mirtazapine, and clonazepam are all tier 2 medications. Copay for a 30 ds is $10 and $0 for 90 ds.    Tangeal explained that patient did not have a copay for hydroxyzine in the past but she does now because the formulary designations in her plan changed. This is something that happens every year.

## 2022-05-27 NOTE — TELEPHONE ENCOUNTER
Central Prior Authorization Team   Phone: [phone]      Tier Exception Initiation - Because it is end of the day on Friday before a holiday weekend, I did not submit this on as a Urgent request. If I did, insurance would have 24 hours to review, so if they had questions, they'd call/fax Friday night or Saturday and get no response, resulting in the request being denied. Instead, I submitted the request right away on a standard review.    Medication: hydroxyzine pamoate (Vistaril) 50 mg capsules   Insurance Company: [email or web address] ([facility]) - Phone [phone] Fax [phone]  Pharmacy Filling the RX: [facility] - [address], [address]  Filling Pharmacy Phone: [phone]  Copay Amount:

## 2022-05-27 NOTE — TELEPHONE ENCOUNTER
Marquis England MD Valena, Victoria, RN  Caller: Unspecified (Today,  8:11 AM)  Great, lets do the tier reduction request if possible. This also helps explain the charges to Pardeep Cho     Follow up:    - will do a separate phone encounter for the tier reduction

## 2022-05-27 NOTE — TELEPHONE ENCOUNTER
Discussed that the 30 day prescriptions for clonazepam 0.5mg and 1.0mg are active with her pharmacy, and will use those. Will discuss her request for 90 day rx supplies at her 6/22/2022 appointment.

## 2022-05-27 NOTE — TELEPHONE ENCOUNTER
Brief telephone note:    This writer along with Dr. Cantrell contacted Ana Laura via telephone to discuss her medication requests.  Upon answering Ana Laura reported that she was doing well and was concerned about the cost of her medications.  She reports when medications are not prescribed for 90 day supplies such as clonazepam she has a co-pay versus no co-pay when prescribed for 90 days.  She reports adequate supplies of clonazepam and hydroxyzine at this time.  Ana Laura agreed to pay the co-pay for her next 30-day refill of clonazepam and we will address changing to 90 days at her next appointment.  We discussed that her current prescription of hydroxyzine was prescribed for 90 days but the additional charge was due to a change in the tier of this medication by her insurance plan.  We informed Ana Laura that we are requesting a tier reduction for this medication and if this tier reduction is approved she would no longer have co-pay.  Ana Laura reported she returned the previous prescription of this medication and there is refunded her co-pay.  We discussed trying alternative medication such as diphenhydramine if the tier reduction has not improved and Ana Laura agreed to continue hydroxyzine use with her remaining supply until her next appointment.  If a tier reduction is improved we will continue with hydroxyzine and if not we will explore alternative options for this therapy at her next appointment on 6/22/2022.    Marquis England M.D.

## 2022-05-27 NOTE — TELEPHONE ENCOUNTER
"[facility]    Phone Message    May a detailed message be left on voicemail: yes     Reason for Call:   Patient is requesting for a call back Today. She stated that she has told Dr. Andrews and Dr. England multiple times that her med refills need to be sent in as 90 days or she gets charged with co-pays and yet her meds are still be sent in as 30 day refills.    She also said that she talked to [facility] and sent back her Visteral 270mg because it was sent as a 30 day refill and she got charged $98 for it. She is completely out of her medications and stated, \"I only call when I'm completely out of medications, so you guys need to understand that I do not have any meds because it keeps getting sent in as 30 days.\"    Patient requested to also discuss future care. She requested that her appointment on 6/22/22 be changed to Telephone visit. Caller asked if she could do video and she denied video and coming in person and said she will only do Telephone.    Patient said \"I need to get a call back today to talk about these 3 things.\" Pt request encounter be made high priority.      Action Taken: Message routed to:  Other: P PSYCHIATRY NURSE-ANTHONY KITCHEN    Travel Screening: Not Applicable                                                                        "

## 2022-05-27 NOTE — TELEPHONE ENCOUNTER
Prior Authorization Retail Medication Request - Tier Reduction     Medication/Dose: hydroxyzine pamoate (Vistaril) 50 mg capsules - TID PRN for anxiety     ICD code (if different than what is on RX):  F31.9 Bipolar I disorder     Previously Tried and Failed:  Patient has tried several different medication classes to target severe anxiety and bipolar depression.     Second Generation Antipsychotics  - quetiapine (Seroquel)  - olanzapine (Zyprexa) - current  - aripiprazole (Abilify)  - risperidone (Risperdal)  - ziprasidone (Geodon)    Mood Stabilizers  - divalproex (Depakote)  - lithium  - lamotrigine (Lamictal)  - topiramate (Topamax)    Antidepressants  SSRIs  - paroxetine (Paxil)  - sertraline (Zoloft)  - citalopram (Celexa)    SNRIs  - venlafaxine (Effexor)  - duloxetine (Cymbalta)    SN-RAn  - mirtazapine (Remeron) - current    TCAs  - amitriptyline (Elavil)  - nortriptyline (Pamelor)    Benzodiazepines  - alprazolam (Xanax)  - lorazepam (Ativan)  - clonazepam (Klonopin) - current    Beta blocker  - propranolol (Inderal)          Rationale:  Patient has taken hydroxyzine (both Atarax and Vistaril) for intractable anxiety going back to at least 2012. Hydroxyzine is necessary for patient to have on hand for acute anxiety flare-ups, which can happen frequently. This medicine also allows for the gradual reduction of her benzodiazepine dose, while still providing adequate coverage for her severe anxiety.    This is an essential medicine but the patient does not have the financial resources to shoulder the cost of the copay associated with a tier 3 medication. Without this medicine though, the risk of decompensation leading to hospitalization increases.        Insurance Name: [facility] [employer]IC[employer]VANTAGE   Insurance ID:  [id number]        Pharmacy Information (if different than what is on RX)  Name:  [facility]  Phone:  [phone]  Fax:  [phone]     Will request assistance from the PA Team.

## 2022-05-27 NOTE — TELEPHONE ENCOUNTER
Received a message that patient is calling and is very upset about the runaround regarding her refill. She would like to know why she is not being given a 90 day supply as this creates hardship for her in terms of copays. Advised  that upon review of the encounter, writer does not have an update for her and will need to follow up. Requested that patient be advised that her concerns have been forwarded and she will receive a call once a determination has been made.

## 2022-06-07 NOTE — TELEPHONE ENCOUNTER
Called insurance to check status of Tier Exception. Spoke to rep [place] who says she doesn't see the request, but verified the fax# I sent was correct (fax# [phone]).  Asked if we could do the request over the phone. We start and after spelling all 21 meds tried, the call disconnects.     I call back, spoke to Angela and completed the Tier Exception over the phone with her. Will have a determination within the next 24 hours.   Case# [id number].

## 2022-06-08 NOTE — TELEPHONE ENCOUNTER
1 fax page received from [employer] indicating that the request for exception was approved through 12/31/2022. Gregory Bronson, EMT

## 2022-06-08 NOTE — TELEPHONE ENCOUNTER
Fax received asking if patient has tried venlafaxine and escitalopram in the past. Fax filled out and returned.

## 2022-06-21 NOTE — PATIENT INSTRUCTIONS
The EKG looks normal. I will pass this along to Dr Andrews.     I believe that the lumps beneath the skin in the right elbow are benign and likely scar tissue, maybe from a previous skin infection that has healed. No signs of blood clot, tumor or infection.   No treatment is required, but if you would like to get the Dermatologist's opinion, someone will contact you to offer you an appointment with them.     I'm not able to see any ingrown hair or lumps in the right bikini line region. If you note problems or worsening, recommend seeing your OB-GYN provider.     See Dr Andrews and other health care providers as they recommend.   See Dr Lincoln for your next annual exam in January 2023.

## 2022-06-21 NOTE — PROGRESS NOTES
Assessment & Plan   (Z79.899) Encounter for medication management  (primary encounter diagnosis)  (R00.2) Palpitations  (F31.9) Bipolar I disorder (H)  Comment: Normal EKG. Will forward today's note to her psychiatrist.   Plan: EKG 12-lead complete w/read - Clinics          (R22.31) Skin lump of arm, right  Comment: Offered reassurance, but also dermatology consult if she is interested. See pt instructions and epic orders.   Plan: Adult Dermatology Referral          (Z87.2) History of folliculitis  (Z00.00) Normal external genitalia exam  Comment: Reassurance provided. F/u with OB-GYN as they recommend or sooner prn.        Patient Instructions   The EKG looks normal. I will pass this along to Dr Andrews.     I believe that the lumps beneath the skin in the right elbow are benign and likely scar tissue, maybe from a previous skin infection that has healed. No signs of blood clot, tumor or infection.   No treatment is required, but if you would like to get the Dermatologist's opinion, someone will contact you to offer you an appointment with them.     I'm not able to see any ingrown hair or lumps in the right bikini line region. If you note problems or worsening, recommend seeing your OB-GYN provider.     See Dr Andrews and other health care providers as they recommend.   See Dr Lincoln for your next annual exam in January 2023.           Return in about 7 months (around 1/21/2023) for Physical Exam with Dr Lincoln.    Benito Brennan MD,   [facility] ANGELIQUE Du is a 51 year old, presenting for the following health issues:  Derm Problem (Ingrown hair on bikini line and lump on right elbow, [facility] psychiatrist is requesting an EKG)      HPI     The patient requests that an EKG be checked as recommended by her psychiatrist.  She does have occasional fleeting palpitations, no sustained palpitations, dizziness, chest discomfort or dyspnea reported.    She points out some visible and  "palpable superficial skin lumps above her right elbow.  She has noted them for about 3 months or so.  Sometimes she can feel some \"cracking\" when she flexes and extends her elbow joint.  She denies having any similar lumps elsewhere, and reports no lumps or arthralgias in her hands or feet.    Also, she wonders about a possible skin lump in her right \"bikini line\".  Chart review indicates a removal of an ingrown hair by Dr. Valiente at her last OB/GYN appointment in April of this year.    Past medical, family and social histories as well as medications reviewed and updated as needed.    Review of Systems   REVIEW OF SYSTEMS: The following systems have been completely reviewed and are negative except as noted above:   Constitutional, respiratory, cardiovascular, musculoskeletal, dermatologic systems.        Objective    /78   Pulse 92   Temp 98  F (36.7  C) (Tympanic)   Resp 16   Ht 1.676 m (5' 6\")   Wt 58.5 kg (129 lb)   LMP 01/13/2018 (Exact Date)   SpO2 98%   Breastfeeding No   BMI 20.82 kg/m    Body mass index is 20.82 kg/m .     Physical Exam   GENERAL: healthy, alert and no distress  RESP: lungs clear to auscultation - no rales, rhonchi or wheezes  CV: regular rate and rhythm, normal S1 S2, no S3 or S4, no murmur, click or rub, no peripheral edema and peripheral pulses strong  MS: no gross musculoskeletal defects noted, no edema  SKIN: Several small, perhaps 2-3 mm diameter firm subcutaneous lesions above the right elbow, proximal to the olecranon, no redness/tenderness. No suspicious lesions or rashes  GYN: small areas of flat pigmentation in the right external genitalia region, no palpable masses or visible lesion.    NEURO: Normal strength and tone, mentation intact and speech normal  PSYCH: mentation appears normal, affect normal/bright    EKG - Reviewed and interpreted by me appears normal, NSR, normal axis, normal intervals, no acute ST/T changes c/w ischemia, no LVH by voltage criteria, " unchanged from previous tracings
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
## 2022-06-22 NOTE — TELEPHONE ENCOUNTER
"[facility] Call Center    Phone Message    May a detailed message be left on voicemail: yes     Reason for Call: Medication Question or concern regarding medication   Prescription Clarification  Name of Medication:   Prescribing Provider: Tomás   Pharmacy:      [facility] [address] [facility] [place], [place] - [address]     What on the order needs clarification?     Refill from Ana Laura's appt today with Dr England went to the wrong pharmacy.  It needs to go to the [facility] in [place] \"under the good RX price\".    Prescription that went to [facility] needs to be cancelled.      Patient stated that this was urgent       Action Taken: Other: Nursing pool    Travel Screening: Not Applicable                                                                      "

## 2022-06-22 NOTE — PATIENT INSTRUCTIONS
**For crisis resources, please see the information at the end of this document**   Patient Education    Thank you for coming to the [facility] [place] [facility].     Lab Testing:  If you had lab testing today and your results are reassuring or normal they will be mailed to you or sent through [facility] within 7 days. If the lab tests need quick action we will call you with the results. The phone number we will call with results is # [phone]. If this is not the best number please call our clinic and change the number.     Medication Refills:  If you need any refills please call your pharmacy and they will contact us. Our fax number for refills is [phone].   Three business days of notice are needed for general medication refill requests.   Five business days of notice are needed for controlled substance refill requests.   If you need to change to a different pharmacy, please contact the new pharmacy directly. The new pharmacy will help you get your medications transferred.     Contact Us:  Please call [phone] during business hours (8-5:00 M-F).   If you have medication related questions after clinic hours, or on the weekend, please call [phone].     Financial Assistance [phone]   Medical Records [phone]       MENTAL HEALTH CRISIS RESOURCES:  For a emergency help, please call 911 or go to the nearest Emergency Department.     Emergency Walk-In Options:   Em[facility] Unit @ [facility] ([place]): [phone] - Specialized mental health emergency area designed to be calming  [facility] ([place]): [phone]  [facility] ([place]): [phone]  [facility]): [phone]    [facility] Crisis Information:   [place]: [phone]  [place]: [phone]  [place] ([place]) - Adult: [phone]     Child: [phone]  [place] - Adult: [phone]     Child: [phone]  [place]:  [phone]  List of all [facility] resources:   https://[place].gov/dhs/people-we-serve/adults/health-care/mental-health/resources/crisis-contacts.jsp    National Crisis Information:   Crisis Text Line: Text  [place]  to 750283  National Suicide Prevention Lifeline: 3-196-796-TALK (1-110.160.7164)       For online chat options, visit https://suicidepreventionlifeline.org/chat/  Poison Control Center: 1-580.748.3093  Trans Lifeline: 1-479.511.3938 - Hotline for transgender people of all ages  [employer]: 1-800.434.5083 - Hotline for LGBT youth     For Non-Emergency Support:   Fast Tracker: Mental Health & Substance Use Disorder Resources -   https://www.[employer].org/       Treatment Plan Today:     1) Medications-start fluoxetine 10 mg daily, reduce clonazepam to 1.5 mg every morning and 2 mg at bedtime.  Continue other medications as prescribed    2) Follow-up appt with neck scheduled provider in 4 weeks    3) Crisis numbers are below and clinic after hours number is [phone]

## 2022-07-19 NOTE — TELEPHONE ENCOUNTER
Per [place]  clonazePAM (KLONOPIN) 1 MG tablet 5/27 #90, 3/7 #360, 12/15 #360    Writer routed to provider for approval.

## 2022-07-19 NOTE — TELEPHONE ENCOUNTER
Last seen: 6/22/22  RTC: 4 weeks   Cancel: none  No-show: none  Next appt: 8/4/22     Incoming refill from pharmacy via interface    Medication requested:   Pending Prescriptions:                       Disp   Refills    clonazePAM (KLONOPIN) 0.5 MG tablet [Phar*45 tab*             Sig: Take 1 tablet (0.5 mg) by mouth every morning           with a 1 mg tablet for a total of 1.5 mg every           morning.        Last refill per       From chart note:   -Reduce clonazepam to 1.5 mg in the morning and 2 mg at bedtime beginning 6/5/22  (no refill needed today, adequate home supply)         Medication sent to provider for review.

## 2022-07-19 NOTE — TELEPHONE ENCOUNTER
Last Seen 6/22/22  RTC 4 weeks  Cancel 0  No-Show 0    Next Appt 8/4/22    Incoming Refill From [facility] via fax    Medication Requested   clonazePAM (KLONOPIN) 1 MG tablet    Directions   Route: Take 1 tablet (1 mg) by mouth every morning AND 2 tablets (2 mg) At Bedtime. - Oral    Qty 90    Last Refill 6/6/22    Medication Refill Pended Per Refill Protocol

## 2022-07-19 NOTE — TELEPHONE ENCOUNTER
Same patient from earlier message; she is supposed to be taking 1 mg + 0.5 (from earlier script). I dont know why this one wasn't sent the same time as the other.     Last Seen 6/22/22  RTC 4 weeks  Cancel 0  No-Show 0     Next Appt 8/4/22     shows taking as prescribed.

## 2022-07-19 NOTE — TELEPHONE ENCOUNTER
Last seen: 6/22/22  RTC: 4 weeks   Cancel: none  No-show: none  Next appt: 8/4/22    Per , taking appropriately with last fill being end of May.

## 2022-07-20 NOTE — TELEPHONE ENCOUNTER
Per meds tab, this was refilled yesterday. Routed to [facility] Fonseca for refusal of pended script. Shantelle Ayala, EMT

## 2022-07-28 NOTE — DISCHARGE INSTRUCTIONS
Discharge Instructions for Acute Pancreatitis  You have been diagnosed with acute pancreatitis. Your pancreas is inflamed or swollen. The pancreas is an organ that makes digestive juices and hormones. Gallstones are a common cause of pancreatitis. These hard stones form in the gallbladder. The gallbladder shares a tube with the pancreas into the small intestine. If gallstones block this tube, fluid can t leave the pancreas. The fluid backs up and causes redness and swelling (inflammation). There are other causes of pancreatitis. Make sure you understand the cause of your pancreatitis. Then you can try to stop it from happening again.  Immediate home care    Find someone to drive you to appointments. Acute pancreatitis is a serious condition, and you should never drive if you are experiencing symptoms.    Stop drinking if your illness was caused by alcohol.  ? Ask your healthcare provider about alcohol abuse programs and support groups such as Alcoholics Anonymous.  ? Ask your provider about prescription medicines that can help you stop drinking.  ? Tell your provider about the alcohol withdrawal symptoms you have when you stop drinking. This is very important. You may need close medical supervision and special medicines when you stop drinking. This will depend on your alcohol withdrawal history.     Take your medicines exactly as directed. Don t skip doses.    Eat a low-fat diet. Ask your provider for menus and other diet information.    Learn to take your own pulse. Keep a record of your results. Ask your provider which readings mean that you need medical attention.  Ongoing care    Tell your provider about any medicines you are taking. Some medicines can cause this condition.    Before starting any new medicine, ask your provider if it will harm your pancreas. This includes any new over-the-counter medicines, vitamins, or herbal supplements.      Tell your provider if you lose weight without dieting.    Be aware  of symptoms that may mean your pancreatitis has come back. These symptoms include belly pain, nausea and vomiting, and fever.    Keep all follow-up appointments with your provider. Problems can often show up later.  Follow-up  Follow up with your healthcare provider, or as advised.  When to call your provider  Call your healthcare provider right away if you have any of the following:    Fever of 100.4 F (38.0 C) or higher, or as advised by your provider    Severe pain from your upper belly to your back    Nausea and vomiting    Feely dizzy or lightheaded    Yellowing of your skin or eyes (jaundice)    Bruises on your belly or back    Belly swelling and tenderness    Rapid pulse    Shallow, fast breathing   Date Last Reviewed: 8/1/2016 2000-2017 [employer]. [address] [place] [address], [place] [address]. All rights reserved. This information is not intended as a substitute for professional medical care. Always follow your healthcare professional's instructions.         Carac Counseling:  I discussed with the patient the risks of Carac including but not limited to erythema, scaling, itching, weeping, crusting, and pain.

## 2022-08-03 NOTE — TELEPHONE ENCOUNTER
Refused Prescriptions:                       Disp   Refills    pantoprazole (PROTONIX) 40 MG EC tablet [P*200 ta*2        Sig: TAKE 1 TABLET BY MOUTH  TWICE DAILY    180 tablets with 1 refill sent on 5/18/2022    Luann WALLS RN   [facility]

## 2022-08-04 NOTE — Clinical Note
"GLORIA Aranda Good job documenting the risks, the mitigating factors and our close follow up.  I did change your second sentence in the subjective to make it clear she was not in imminent danger requiring a hold: - continuing to think about  plan to kill herself, via overdose, but denies intent to harm herself and has several protective factors.  (Versus \"thinks about a plan to kill herself via overdose\"  Let me know if questions.  Thanks LZ"

## 2022-08-04 NOTE — PATIENT INSTRUCTIONS
- Safety planning was done. If she starts to feel the suicidal ideation is getting worse she plans to call loved ones/friends (Devin Renee), [facility] counseling (available 24/7), she has crisis line numbers (has history of fusing when needed), and she is aware of the [facility] if needed (has pattern of visiting with most recent worsening of SI).   - We will plan for a close follow-up in 1 week, with a check-in by nursing on Monday.   - We will start fluoxetine 10 mg daily as previously planned by Dr. England.   - We did discuss giving her supply of medications to a friend/neighbor while only keeping a week supply in her pill calendar. She felt confident in her ability to manage ideation impulses by putting her pill bottles away and only keeping the weekly pill calendar out.     **For crisis resources, please see the information at the end of this document**   Patient Education    Thank you for coming to the [facility] [place] [facility].     Lab Testing:  If you had lab testing today and your results are reassuring or normal they will be mailed to you or sent through [employer] within 7 days. If the lab tests need quick action we will call you with the results. The phone number we will call with results is # [phone]. If this is not the best number please call our clinic and change the number.     Medication Refills:  If you need any refills please call your pharmacy and they will contact us. Our fax number for refills is [phone].   Three business days of notice are needed for general medication refill requests.   Five business days of notice are needed for controlled substance refill requests.   If you need to change to a different pharmacy, please contact the new pharmacy directly. The new pharmacy will help you get your medications transferred.     Contact Us:  Please call [phone] during business hours (8-5:00 M-F).   If you have medication related questions after clinic  hours, or on the weekend, please call [phone].     Financial Assistance [phone]   Medical Records [phone]       MENTAL HEALTH CRISIS RESOURCES:  For a emergency help, please call 911 or go to the nearest Emergency Department.     Emergency Walk-In Options:   [facility] Unit @ [facility] ([place]): [phone] - Specialized mental health emergency area designed to be calming  [facility] ([place]): [phone]  [facility] ([place]): [phone]  [facility]): [phone]    [facility] Crisis Information:   [place]: [phone]  [place]: [phone]  [place] ([place]) - Adult: [phone]     Child: [phone]  [place] - Adult: [phone]     Child: [phone]  [place]: [phone]  List of all [facility] resources:   https://[place].gov/dhs/people-we-serve/adults/health-care/mental-health/resources/crisis-contacts.jsp    National Crisis Information:   Crisis Text Line: Text  [place]  to 740357  Suicide & Crisis Lifeline: 988  National Suicide Prevention Lifeline: 5-428-307-TALK (1-268.122.8259)       For online chat options, visit https://suicidepreventionlifeline.org/chat/  Poison Control Center: 1-604.296.9515  Trans Lifeline: 9-344-155-6169 - Hotline for transgender people of all ages  [employer]: 1-582-407-7013 - Hotline for LGBT youth     For Non-Emergency Support:   Fast Tracker: Mental Health & Substance Use Disorder Resources -   https://www.Counselyticsn.org/

## 2022-08-04 NOTE — PROGRESS NOTES
Ana Laura Wharton is a 51 year old who has consented to receive services via billable video visit.      Pt will join video visit via: [employer]  If there are problems joining the visit, send backup video invite via: Text to preferred phone: [phone]      Originating Location (patient location): Patient's home  Distant Location (provider location): [facility] [place] [facility]    Will anyone else be joining the video visit? No
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
## 2022-08-08 NOTE — TELEPHONE ENCOUNTER
"RN Check-In    Writer contacted patient via phone for a follow up / safety check.      Patient states she started fluoxetine a few days ago and reports \"it is working really well.\"  Patient states that he depression has lessened.  Patient denies any SI/SIB, passive or active. Patient expressed hope for the medication to continue to improve her symptoms.     Patient states an understanding to continue to monitor her symptoms and to call the clinic if she starts to feel worse.  Patient states an understanding to seek emergency assistance if active thoughts of SI surface.      Patient confirmed appointment with Dr. Donis 8/11 at 8 am.  "

## 2022-08-11 NOTE — PATIENT INSTRUCTIONS
-[facility] Psychiatry 24/7 phone to connect to on-call doctor, [phone]    **For crisis resources, please see the information at the end of this document**   Patient Education    Thank you for coming to the [facility]ENT[facility] & [facility] [place] CLINIC.     Lab Testing:  If you had lab testing today and your results are reassuring or normal they will be mailed to you or sent through [employer] within 7 days. If the lab tests need quick action we will call you with the results. The phone number we will call with results is # [phone]. If this is not the best number please call our clinic and change the number.     Medication Refills:  If you need any refills please call your pharmacy and they will contact us. Our fax number for refills is [phone].   Three business days of notice are needed for general medication refill requests.   Five business days of notice are needed for controlled substance refill requests.   If you need to change to a different pharmacy, please contact the new pharmacy directly. The new pharmacy will help you get your medications transferred.     Contact Us:  Please call [phone] during business hours (8-5:00 M-F).   If you have medication related questions after clinic hours, or on the weekend, please call [phone].     Financial Assistance [phone]   Medical Records [phone]       MENTAL HEALTH CRISIS RESOURCES:  For a emergency help, please call 911 or go to the nearest Emergency Department.     Emergency Walk-In Options:   EmPATH Unit @ [facility] ([place]): [phone] - Specialized mental health emergency area designed to be calming  [facility] ([place]): [phone]  [facility] ([place]): [phone]  [facility]): [phone]    [facility] Crisis Information:   [place]: [phone]  [place]: [phone]  [place] ([place]) - Adult: [phone]     Child: [phone]  [place] Mathews  Adult: [phone]     Child: [phone]  [place]: [phone]  List of all [facility] resources:   https://[place].gov/dhs/people-we-serve/adults/health-care/mental-health/resources/crisis-contacts.jsp    National Crisis Information:   Crisis Text Line: Text  [place]  to 783189  Suicide & Crisis Lifeline: 988  National Suicide Prevention Lifeline: 5-466-721-TALK (1-109.153.5771)       For online chat options, visit https://suicidepreventionlifeline.org/chat/  Poison Control Center: 5-835-425-7416  Trans Lifeline: 1-879.806.8244 - Hotline for transgender people of all ages  The [employer]: 5-749-179-1592 - Hotline for LGBT youth     For Non-Emergency Support:   Fast Tracker: Mental Health & Substance Use Disorder Resources -   https://www.[place] [place].org/
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
## 2022-08-11 NOTE — PROGRESS NOTES
Ana Laura Wharton is a 51 year old who has consented to receive services via billable video visit.      Pt will join video visit via: [employer]  If there are problems joining the visit, send backup video invite via: Send to preferred e-mail: [email or web address]      Originating Location (patient location): Patient's home  Distant Location (provider location): [facility] MENTAL HEALTH & ADDICTION [place] CLINIC    Will anyone else be joining the video visit? No

## 2022-08-12 NOTE — TELEPHONE ENCOUNTER
Lab calls stating pt is there at the Lab asking to repeat her urine test.   Pt just started her new antibiotics on Sunday. Confirmed that with the pt when she got on the phone.   Pt states the ED doctor told her to follow up on 7/31 and get her urine rechecked. Advised her that she needs to finish her antibiotics first, and then CALL THE CLINIC to see what Dr Lincoln would like to do. He may want to see her in clinic first versus repeat UA. She agrees with this plan.    Her AVS from the ED states:  Follow up with Liborio Lincoln MD (Internal Medicine) in 3 days (7/31/2019).   This is standard printout from the ED, Patient should follow up via phone.          Patient expressed no known problems or needs

## 2022-08-15 NOTE — TELEPHONE ENCOUNTER
[facility] Call Center    Phone Message    May a detailed message be left on voicemail: yes     Reason for Call: Other: Pt stated she gained 15 pounds in the last week. Not sure if its her thyroid or the Prozac she started. She's already getting a lab for her glucose levels so she also would like to request a lab for her thyroid. Pt is requesting a call back whether the order is approved or not.  Pt is planning to go get her labs done tomorrow.     Action Taken: Other: [place] LoggedIn psych pool    Travel Screening: Not Applicable

## 2022-08-18 NOTE — TELEPHONE ENCOUNTER
Writer returned patient's call after consulting with Dr. Donis. Writer explained that patient's a1c was normal, and that there was not a concern for diabetes despite isolated elevated blood sugar reading of 135. Patient verbalized understanding and denies any other questions or concerns at this time.

## 2022-08-18 NOTE — TELEPHONE ENCOUNTER
"Patient calling about the lab results. Patient was told her glucose levels are \"off the charts\" high and patient is wondering what kind of response Dr Donis has or if she needs to change medications.  "

## 2022-08-24 NOTE — TELEPHONE ENCOUNTER
[facility] Call Center    Phone Message    May a detailed message be left on voicemail: yes     Reason for Call: Medication Refill Request    Has the patient contacted the pharmacy for the refill? Yes   Name of medication being requested: nitroFURantoin macrocrystal-monohydrate (MACROBID) 100 MG capsule  Provider who prescribed the medication: Prince  Pharmacy: [facility] [id number] [place] - [address]  Date medication is needed: ASAP    Patient is calling to get a refill on the medication Prince had previously prescribed for her. She had her last refill in 5/2022 and is due for a new refill on 8/28/22. She requested that she have 2 refills of the 90 day medication placed for her and she is wanting this medication to be sent to the [facility] listed above. Please reach out to the patient to further discuss. Thank you    Action Taken: Message routed to:  Other: [facility]    Travel Screening: Not Applicable
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
## 2022-08-25 NOTE — PROGRESS NOTES
Ana Laura Wharton is a 51 year old who has consented to receive services via billable video visit.      Pt will join video visit via: [employer]  If there are problems joining the visit, send backup video invite via: Send to preferred e-mail: [email or web address]      Originating Location (patient location): Patient's home  Distant Location (provider location): [facility] [place] [facility]    Will anyone else be joining the video visit? No

## 2022-08-25 NOTE — PATIENT INSTRUCTIONS
- decrease morning clonazepam to 0.5 mg   - follow-up 9/15 at 9 AM    **For crisis resources, please see the information at the end of this document**   Patient Education    Thank you for coming to the [facility] [place] [facility].     Lab Testing:  If you had lab testing today and your results are reassuring or normal they will be mailed to you or sent through Yorn within 7 days. If the lab tests need quick action we will call you with the results. The phone number we will call with results is # [phone]. If this is not the best number please call our clinic and change the number.     Medication Refills:  If you need any refills please call your pharmacy and they will contact us. Our fax number for refills is [phone].   Three business days of notice are needed for general medication refill requests.   Five business days of notice are needed for controlled substance refill requests.   If you need to change to a different pharmacy, please contact the new pharmacy directly. The new pharmacy will help you get your medications transferred.     Contact Us:  Please call [phone] during business hours (8-5:00 M-F).   If you have medication related questions after clinic hours, or on the weekend, please call [phone].     Financial Assistance [phone]   Medical Records [phone]       MENTAL HEALTH CRISIS RESOURCES:  For a emergency help, please call 911 or go to the nearest Emergency Department.     Emergency Walk-In Options:   EmPATH Unit @ [facility] ([place]): [phone] - Specialized mental health emergency area designed to be calming  [facility] ([place]): [phone]  [facility] ([place]): [phone]  [facility]): [phone]    [facility] Crisis Information:   [place]: [phone]  [place]: [phone]  [place] ([place]) - Adult: [phone]     Child: [phone]  [place] -  Adult: [phone]     Child: [phone]  [place]: [phone]  List of all [facility] resources:   https://[place].gov/dhs/people-we-serve/adults/health-care/mental-health/resources/crisis-contacts.jsp    National Crisis Information:   Crisis Text Line: Text  [place]  to 728795  Suicide & Crisis Lifeline: 988  National Suicide Prevention Lifeline: 8-399-950-TALK (1-541.967.7604)       For online chat options, visit https://suicidepreventionlifeline.org/chat/  Poison Control Center: 2-250-342-9670  Trans Lifeline: 1-964.340.7079 - Hotline for transgender people of all ages  [employer]: 9-302-154-5936 - Hotline for LGBT youth     For Non-Emergency Support:   Fast Tracker: Mental Health & Substance Use Disorder Resources -   https://www.D[employer] Fix RXtrackermn.org/

## 2022-08-26 NOTE — TELEPHONE ENCOUNTER
Patient calling again in regards to this. Told her it takes about 72 hrs for refills. Reach out if needed. Thank you!

## 2022-09-02 NOTE — TELEPHONE ENCOUNTER
Writer called patient to update on prescription change. Patient states she typically takes 2 of her PRN hydroxyzine at HS and writer encouraged patient to stagger her TID hydroxyzine throughout the day. Patient verbalized understanding and denied any other questions or concerns at this time.     Rosi Donis MD  You 2 minutes ago (2:42 PM)     MANISHA Osborn,     We can increase the fluoxetine to 20 mg and she can take that hydroxyzine up to 3 times per day. She had self discontinued it before, though if she is using it again I can re-order it.     Message text

## 2022-09-02 NOTE — TELEPHONE ENCOUNTER
Received call from patient. Patient tearful throughout call and reporting an increase in depression anxiety for the past 2 days. 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 Patient also reports a recent break up with someone she really cared about. She also states that she fractured her heel and has an appointment at 11AM today to look at her brace. Financial concerns have also been a major stressor for patient. Patient reports anxiety has been constant.     Patient reports that she took 5 mg of Zyprexa and 50 mg of Vistaril 1 hour prior to calling clinic, but reports that this has not been effective in decreasing anxiety. Patient states she generally takes 2 of her TID hydroxyzine at bedtime for sleep, so is wondering if provider has any other suggestions for utilization of hydroxyzine.     Patient reports her clonazepam is being tapered down and she wants to continue to taper of of this, but is wondering if Dr. Donis would consider increasing her Prozac to 20 mg as this was reportedly discussed during their last appointment.     Patient reports passive, fleeting SI, but denies a plan or intent. Patient states that she has multiple people that she can call for support. Writer also discussed EmPATH and the emergency department with patient, which she states she would be open to if increased safety concerns. Writer also encouraged use of TIPP skills.      Routed to provider for review.           Constant anxiety.

## 2022-09-03 NOTE — CONFIDENTIAL NOTE
"    ---------------------------------------------------------------------------------------------------------    Brief Psychiatry Phone note      Ana Laura Wharton MRN# [id number]   Age: 51 year old   Clinic Provider: Dr. Donis YOB: 1970   PCP: Liborio Lincoln          Phone note:   Writer was notified that Ana Laura Wharton has called and requested to speak with a resident regarding medication side effects related to Prozac. Writer called Ana Laura Wharton at this number: [phone]  at 1:13 PM. She reports that she was not truthful about her alcohol consumption and had told her provider she had quit drinking the month prior, but had actually just \"come off of drinking recently\". Has noted \"blurry vision\" and \"flat affect, like I want to laugh and can't or cry and can't\" ever since she started the Prozac, but had originally attributed these symptoms to other things. Took 1x dose of the 20mg that was prescribed at her recent visit and felt like it made things worse. States that she wants to stop the Prozac entirely and that she does not need to be taking it at all. Read on the internet that there could be withdrawal effects and was worried about how to discontinue the medication safely.    Does acknowledge that she was having increases in SI without plan or intent yesterday, but that this is significantly improved today and is having none currently. States that she is feeling safe and has several contacts that she can reach out to if she has any worsening of symptoms. Expressed gratitude for the call and said she was relieved to have talked to someone and was feeling much better about the situation.    Patient was alert and conversational. Speech was clear with regular rate and rhythm. Thought process was logical and linear. Thought content was negative for safety concerns. Memory and attention were without gross deficit. Insight and judgment intact.          Assessment and Plan:   Ana Laura Wharton " is a 52 y/o female calling to speak with on-call provider regarding medication side effects related to Prozac. Discussed that she had only taken the fluoxetine 20mg dose once since it was prescribed and that given this it would be safe for her to discontinue the medication now rather than needing a more prolonged taper. Discussed the importance of reporting struggles with AUD to her primary psychiatry team and she was amenable to that. Plans to stop the fluoxetine today and follow up with Dr. Donis at previously scheduled visit on 9/15. Plan to route note to Dr. Donis to close the loop on updates from this call.    Patient is not currently suicidal or homicidal, and stated that she was safe and had several supports/contacts that she can reach out to if she notices any worsening of symptoms. she is aware to call 911 or go to the nearest ER if safety concern or urgent symptoms arise.    Raymon Garcia MD  Psychiatry Resident, PGY-2

## 2022-09-06 NOTE — TELEPHONE ENCOUNTER
"Writer received call from patient. Per intake staff, patient was tearful on the phone. Patient no longer tearful when writer received call. Patient reported that she was having a really difficult time over the weekend after her Prozac was increased to 20 mg. Patient states \"I fell apart. I had dementia, SI, not eating, blurred vision. I was totally confused with dementia.\" Patient states she has since stopped the Prozac since speaking with on call provider over the weekend.      Patient states that she has been tapering down on her clonazepam, but states she increased her AM clonazepam from 0.5 mg to 1 mg, stating the taper is \"not going well.\" Patient states \"I am starting a new job, I'm having financial hardship, and a broken ankle.\" Patient states that she needs her clonazepam increased to 3 mg per day. Writer advised to take medications as prescribed, utilize TIPP skills and to discuss with Dr. Donis during her next appointment. Writer offered sooner appointment than 9/15, but patient stated that she starts her new job tomorrow and is unsure of her schedule.     Patient states that she needs 90 day supplies of Vistaril sent to [facility] in the future, as smaller supplies cost more for her. Per chart review, \" Do not give 90 day supply of medications due to high risk of suicide.\" Patient states that she has enough medication to get through for a while now, but she wanted to make sure provider was updated. There is also a discrepancy between last visit note and prescription sent to pharmacy for hydroxyzine. Per last visit: Continue hydroxyzine 50 mg TID prn and prescription sent to pharmacy states Take 0.5 tablets (25 mg) by mouth 3 times daily as needed.     Patient adamantly denies any safety concerns of substance use, SI, SIB, HI and hallucinations and she is aware to to go to to the ED if safety concerns arise.   "

## 2022-09-07 NOTE — TELEPHONE ENCOUNTER
"Patient returned writer's call. Patient started new job today, which she reports is overwhelming, but okay. Writer reiterated dangers of mixing alcohol with benzos as well as it possibly contributing to the side effects she was experiencing over the weekend.     Patient responded \"I have been sober since August 10th.\" Patient states she told provider this during their last visit, but per phone call with on-call provider over the weekend, \"She reports that she was not truthful about her alcohol consumption and had told her provider she had quit drinking the month prior, but had actually just 'come off of drinking recently'.\" Writer asked about this conversation, and patient again stated that she has been sober since 8/10.     Patient denies any safety concerns of SI, SIB, HI or hallucinations. Patient denies any questions or concerns at this time.   "

## 2022-09-07 NOTE — TELEPHONE ENCOUNTER
Writer attempted to call patient. LVM requesting a call back at the main clinic number.     Rosi Donis MD  You 2 hours ago (10:55 AM)     KK    I called her today. No answer. If you could try again this afternoon and re-iterate the dangers of mixing alcohol and benzos as it could be potentially deadly. Also that using alcohol elevated her liver enzymes (per labs two weeks ago) which could have caused an imbalance in her medications as they are metabolized in the liver leading to increased weight gain as well as the side effects she experienced over the weekend.

## 2022-09-15 NOTE — PROGRESS NOTES
Ana Laura Wharton is a 51 year old who has consented to receive services via billable video visit.      Pt will join video visit via: [employer]  If there are problems joining the visit, send backup video invite via: Send to preferred e-mail: [email or web address]      Originating Location (patient location): Patient's home  Distant Location (provider location): [facility]    Will anyone else be joining the video visit? No  Answers for HPI/ROS submitted by the patient on 9/15/2022  If you checked off any problems, how difficult have these problems made it for you to do your work, take care of things at home, or get along with other people?: Very difficult  PHQ9 TOTAL SCORE: 16  JOSE 7 TOTAL SCORE: 15
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
## 2022-09-15 NOTE — TELEPHONE ENCOUNTER
"Received call from patient. She states she got a \"goofy\" email from [facility] that her Vistaril was 0.5 tablets (25 mg) for a 90 day supply. Patient states she should be taking 50 mg TID PRN. Patient states she does not need a refill right now, only a correction in her chart. Writer pended the change in another encounter and routed to provider as last visit note reads 50 mg TID PRN.     Patient also wanted to update provider that she is on Flexeril and that orthopedics is planning increase that dosage, but states they have not called her back about specifics yet.   "

## 2022-09-16 NOTE — TELEPHONE ENCOUNTER
Rx Order Note    I received in-box message re: need for clonazepam to be re-ordered following e-prescription to erroneous pharmacy after psychiatric follow-up appointment with Rosio Donis and Lisbet yesterday.     I reviewed associated messages with this encounter, performed chart review including 9/15/2022 Psychiatry Progress Note, [place]  (most recent fills of 1 month supplies of clonazepam 1mg, #90, and clonazepam 0.5mg, #45, were ordered by KENDAL Newman MD, on 5/19/2022 and filled on 5/27/2022).  Noted that the 9/1/2022 clonazepam 1mg order by Dr. Frausto and the 9/15/2022 clonazepam 0.5mg order were cancelled by pharmacy following telephone call by team RN today.  I spoke with Dr. Donis this afternoon to verify plan of his and Dr. Frausto's yesterday to temporarily increase the dose of clonazepam to 1mg po qam and 2mg po at bedtime for a two week period and then lower dose back down to 0.5mg po qam and 2mg po at bedtime.  Additionally, I am to understand that Ana Laura has been abstinent from alcohol since 8/10/2022, and that they reviewed her safety/crisis plan with her yesterday.    I will sign the clonazepam 1mg, #82, sig 1 tab po qam and 2 tabs po at bedtime for two weeks (TDD 3mg), then decrease to 1/2 tab po qam and 2 tabs po at bedtime (TDD 2.5mg) prescription - with the e-prescription being sent to [facility] in [place], [place].    Ana Laura is scheduled for next psychiatric follow-up on 10/6/2022.    Shun Cantrell MD

## 2022-09-16 NOTE — TELEPHONE ENCOUNTER
Patient asking about UA and the moderate leukocyte esterase on the urinalysis and what this represents. Notably the urinalysis is nitrite negative. She is taking pyridium right now which seems to be helping. I recommended to wait for final culture results prior to initiation of any additional antibiotic therapy    Lewis Feldman MD   [facility]  [phone] clinic phone

## 2022-09-16 NOTE — TELEPHONE ENCOUNTER
"Note from Irena to explain discrepency in my last note and the actual prescriptions:   \"She needs this prescription sent to [facility] in [place]. I canceled both previous clonazepam prescriptions at [facility] (neither had been dispensed yet and I cross checked with the ) and edited the pended script to match with your visit note. Please review pended medication for approval. Let me know if you have questions!\"     Otherwise not concerns, taking as prescribed.   "

## 2022-09-16 NOTE — TELEPHONE ENCOUNTER
Writer called patient to update on refill status. Patient states that clonazepam was sent to the wrong pharmacy. She states that she is charged extra if anything less than a 90 day supply is sent to [facility]. She would like clonazepam prescriptions sent to [facility] in [place]. Writer called [facility] to cancel both current clonazepam prescriptions as patient has not received either yet.

## 2022-09-16 NOTE — TELEPHONE ENCOUNTER
Rosi Donis MD  You 20 hours ago (11:18 AM)     KK    It would be okay for her to have cyclobenzaprine increase though she needs to be cautious of it combined with her psych meds causing increased sedation, memory issues, shakes/tremors, and GI upset. Additionally to clarify her medications, she will have her clonazepam increase to 1 mg in the morning. We will send a separate script for 0.5 mg so she can start it immediately. All other doses and medications stay the same.

## 2022-09-16 NOTE — TELEPHONE ENCOUNTER
[facility] Call Center    Phone Message    May a detailed message be left on voicemail: yes     Reason for Call: Symptoms or Concerns     If patient has red-flag symptoms, warm transfer to triage line    Current symptom or concern: cloudy urine, frequent urination with pain, urgency and burning with urination.    Symptoms have been present for:  1 day(s)    Has patient previously been seen for this? Yes    By Prince    Are there any new or worsening symptoms? Yes: getting more severe    Patient requesting Lab order to go to [facility]. Thank you      Action Taken: Message routed to:  Other: Urology    Travel Screening: Not Applicable

## 2022-09-16 NOTE — TELEPHONE ENCOUNTER
"From Irena my RN,    \"She needs this prescription sent to [facility] in [place]. I canceled both previous clonazepam prescriptions at [facility] (neither had been dispensed yet and I cross checked with the ) and edited the pended script to match with your visit note. Please review pended medication for approval. Let me know if you have questions! \"     So basically she did not fill previous prescriptions and pharmacy and nursing consolidated script.   "

## 2022-09-20 NOTE — TELEPHONE ENCOUNTER
[facility] Call Center    Phone Message    May a detailed message be left on voicemail: yes     Reason for Call: Medication Question or concern regarding medication   Prescription Clarification  Name of Medication: sulfamethoxazole-trimethoprim (BACTRIM DS) 800-160 MG tablet  Prescribing Provider: [facility]   Pharm[facility]:    [facility] [place] [place] [facility] [address]   What on the order needs clarification?   The patent called stating she has previously had c-diff from Cipro so she is very worried about being on this antibiotic. She started eating yogurt with probiotics in them plus taking Ultra Strength Probiotic with 5 strains of beneficial bacteria, 100 billion live cultures per capsule. She is wondering if this is too much? She says she still experiencing frequency, burning and up all night to urinate. Please advise. Thank you.    Action Taken: Message routed to:  Other: [facility]    Travel Screening: Not Applicable

## 2022-09-20 NOTE — TELEPHONE ENCOUNTER
Patient reassured she is doing the correct thing and with any antibiotic there is risk of C-diff.  Elly Montgomery LPN

## 2022-09-21 NOTE — TELEPHONE ENCOUNTER
FELICIA left voicemail offering assistance with health insurance eligibility and process. Provided contact number and availability.    YAHAIRA Stevenson, [facility]  [phone]      ----- Message from Rosi Donis MD sent at 9/15/2022 11:58 AM CDT -----  Patient  having difficulty paying co-pays and declines starting therapy due to cost barrier. She wants to speak with  to be evaluated for possible med assistance and affordable therapy.     Thank you!

## 2022-09-25 NOTE — TELEPHONE ENCOUNTER
Pt states that she believes that she has strep throat     Sore throat with white spots on her tonsils    No Fever     Some coughing     Pt is fatigued    Rates throat pain 8/10    No breathing problems     Per Disposition: See PCP Within 24 Hours    Transferred to scheduling for an appointment with provider     Care advice given per protocol and when to call back. Pt verbalized understanding and agrees to plan of care.    Kayla Bui RN  [place] Nurse Advisor  12:49 PM 9/25/2022      COVID 19 Nurse Triage Plan/Patient Instructions    Please be aware that novel coronavirus (COVID-19) may be circulating in the community. If you develop symptoms such as fever, cough, or SOB or if you have concerns about the presence of another infection including coronavirus (COVID-19), please contact your health care provider or visit [email or web address].     Disposition/Instructions    In-Person Visit with provider recommended. Reference Visit Selection Guide.    Thank you for taking steps to prevent the spread of this virus.  o Limit your contact with others.  o Wear a simple mask to cover your cough.  o Wash your hands well and often.    Resources    [facility]: About COVID-19: [email or web address]    CDC: What to Do If You're Sick: www.cdc.gov/coronavirus/2019-ncov/about/steps-when-sick.html    CDC: Ending Home Isolation: www.cdc.gov/coronavirus/2019-ncov/hcp/disposition-in-home-patients.html     CDC: Caring for Someone: www.cdc.gov/coronavirus/2019-ncov/if-you-are-sick/care-for-someone.html     [facility]: Interim Guidance for Hospital Discharge to Home: www.health.[facility].[place]/diseases/coronavirus/hcp/hospdischarge.pdf    [facility] clinical trials (COVID-19 research studies): [email or web address]     Below are the COVID-19 hotlines at the [facility] of [facility] ([facility]). Interpreters are available.   o For health questions: Call [phone] or 1-318.435.1093  (7 a.m. to 7 p.m.)  o For questions about schools and childcare: Call 823-672-2091 or 1-368.878.5931 (7 a.m. to 7 p.m.)                               Reason for Disposition    SEVERE (e.g., excruciating) throat pain    Additional Information    Negative: SEVERE difficulty breathing (e.g., struggling for each breath, speaks in single words, stridor)    Negative: Sounds like a life-threatening emergency to the triager    Negative: [1] Diagnosed strep throat AND [2] taking antibiotic AND [3] symptoms continue    Negative: Throat culture results, call about    Negative: Productive cough is main symptom    Negative: Non-productive cough is main symptom    Negative: Hoarseness is main symptom    Negative: Runny nose is main symptom    Negative: Uvula swelling is main symptom    Negative: [1] Drooling or spitting out saliva (because can't swallow) AND [2] normal breathing    Negative: Unable to open mouth completely    Negative: [1] Difficulty breathing AND [2] not severe    Negative: Fever > 104 F (40 C)    Negative: [1] Refuses to drink anything AND [2] for > 12 hours    Negative: [1] Drinking very little AND [2] dehydration suspected (e.g., no urine > 12 hours, very dry mouth, very lightheaded)    Negative: Patient sounds very sick or weak to the triager    Protocols used: SORE THROAT-A-AH

## 2022-09-26 NOTE — TELEPHONE ENCOUNTER
Call to patient. Advised. Patient asking if this can be done in the ER. Advised if patient would prefer to go through the ER testing could be done in the ER also. Patient requested to be transferred to scheduling.

## 2022-09-26 NOTE — LETTER
September 28, 2022      Ana Laura Wharton  [address]   [place] [address]        Dear MsUrsula,    We are writing to inform you of your test results.    Your strep test was negative.    Resulted Orders   Streptococcus A Rapid Screen w/Reflex to PCR - Clinic Collect   Result Value Ref Range    Group A Strep antigen Negative Negative   Group A Streptococcus PCR Throat Swab   Result Value Ref Range    Group A strep by PCR Not Detected Not Detected    Narrative    The Xpert Xpress Strep A test, performed on the [employer]  Instrument Systems, is a rapid, qualitative in vitro diagnostic test for the detection of Streptococcus pyogenes (Group A ß-hemolytic Streptococcus, Strep A) in throat swab specimens from patients with signs and symptoms of pharyngitis. The Xpert Xpress Strep A test can be used as an aid in the diagnosis of Group A Streptococcal pharyngitis. The assay is not intended to monitor treatment for Group A Streptococcus infections. The Xpert Xpress Strep A test utilizes an automated real-time polymerase chain reaction (PCR) to detect Streptococcus pyogenes DNA.       If you have any questions or concerns, please call the clinic at the number listed above.       Sincerely,      Liborio Lincoln MD      devyn

## 2022-09-26 NOTE — TELEPHONE ENCOUNTER
Patient calling back to see if Dr. Lincoln can see her or at least order strep and Covid testing for her.  She has had a very bad sore throat for 3 days.  No know exposure to strep or Covid.  She has no Covid tests at home and does not currently have a computer to do an E-visit.      She has no fever, no loss of taste or smell.  She has swollen glands, productive cough of green phlegm and is fatigued.  TORITO Fishman R.N.

## 2022-09-27 NOTE — LETTER
September 28, 2022      Ana Laura S Dio  [address]   [place] [facility] [place] [place] [address]        Dear ,    We are writing to inform you of your test results.    Your Covid test was negative.    Resulted Orders   Symptomatic; Unknown COVID-19 Virus (Coronavirus) by PCR Nose   Result Value Ref Range    SARS CoV2 PCR Negative Negative      Comment:      NEGATIVE: SARS-CoV-2 (COVID-19) RNA not detected, presumed negative.    Narrative    Testing was performed using the Aptima SARS-CoV-2 Assay on the  [employer] Instrument System. Additional information about this  Emergency Use Authorization (EUA) assay can be found via the Lab  Guide. This test should be ordered for the detection of SARS-CoV-2 in  individuals who meet SARS-CoV-2 clinical and/or epidemiological  criteria. Test performance is unknown in asymptomatic patients. This  test is for in vitro diagnostic use under the FDA EUA for  laboratories certified under CLIA to perform high complexity testing.  This test has not been FDA cleared or approved. A negative result  does not rule out the presence of PCR inhibitors in the specimen or  target RNA in concentration below the limit of detection for the  assay. The possibility of a false negative should be considered if  the patient's recent exposure or clinical presentation suggests  COVID-19. This test was validated by the [facility]. This laboratory is certified under  the Clinical Laboratory Improvement Amendments of 1988 (CLIA-88) as  qualified to perform high complexity laboratory testing.       If you have any questions or concerns, please call the clinic at the number listed above.       Sincerely,      Liborio Lincoln MD        devyn

## 2022-09-27 NOTE — TELEPHONE ENCOUNTER
Pt was on Bactrim for UTI, and now is having diarrhea. She also may have COVID and strep. Tested today.     She had CDiff before and is concerned about this.   Diarrhea started yesterday.   Having mucus. 4 times since 8 am this AM.   Loose and yellow stools.     She is asking if this could be Cdiff or Covid? If need Cdiff testing?     Last Antibiotic was Friday.     No nausea, does have upset stomach. Has a Sore stomach.     Taking Probiotics while on Antibiotics.     Still waiting for COVID test results. Probably will return Tomorrow for COVID test results.

## 2022-09-27 NOTE — TELEPHONE ENCOUNTER
Pt requesting Covid 19 results from this Morning at [facility]. Results not back.  Caller encouraged to call back.  Pt ok with plan.  Su Hooks RN on 9/27/2022 at 6:35 PM

## 2022-09-28 NOTE — TELEPHONE ENCOUNTER
"Received a call from patient. She states that she has been feeling really \"stuck\" physically as she has been fatigued, nauseous and tearful and reporting a poor appetite for approximately a week. She states she was just tested for COVID and Strep, but both tests resulted negative. Patient also states that due to feeling physically ill, she was unable to make it to her visit with a pharmacist and is hoping to have this rescheduled. She states that she was supposed to have this done prior to her visit with Dr. Donis next week. Patient also wanted provider to know that she has decreased her AM clonazepam to 0.5 mg.     Patient states she spoke with her insurance company and they will cover anywhere she wants to go (in or out of network) for therapy. Patient states her preference is to go somewhere in [place], as that's where she lives, so is hoping to get some recommendations.    Patient denies any safety concerns at this time and identified multiple coping skills and emotional support people in her life.     "

## 2022-09-28 NOTE — PROGRESS NOTES
Ana Laura is a 51 year old who is being evaluated via a billable telephone visit.      What phone number would you like to be contacted at? [phone]  How would you like to obtain your AVS? MyChart    Assessment & Plan     Fatigue, unspecified type  Patient has had fatigue, and extremely sore throat for 6 days. She tested negative for covid and strep yesterday. I will test for mono in addition. If all is negative I will ask that patient be seen in person to evaluate need for antibiotic given hx of c. Diff.  - CBC with platelets and differential; Future  - Mononucleosis screen; Future    Back muscle spasm  Patient has prescription for flexeril. I recommend that she can take that up to 3 times daily as needed for spasms in back. I do not think that it would be related to sore throat and fatigue    Throat pain  Patient has new sexual partners as well. Will check for STI of throat  - NEISSERIA GONORRHOEA PCR; Future  - CHLAMYDIA TRACHOMATIS PCR; Future  - Mononucleosis screen; Future     No follow-ups on file.    Salomón Shea NP  [facility]   Ana Laura is a 51 year old, presenting for the following health issues:  Fatigue      HPI     Fatigue and sore throat for 8 days. Negative strep and Covid done at [facility] on 9-. FYI: Finished bactrim prescription for UTI on 9-.     Patient feels that she has an extremely sore throat and is extremely fatigued for the past 6 days starting 9/22. No pressure in sinuses, SOB, cough and congestion.     Fever of 100.2- has been taking ibuprofen    Is also having back spasms from neck to butt  Onset: last Thursday   Location: whole back feels like it is spasming  Duration: neck to butt  Characteristics: spasms and soreness  Aggravating/relieving: spasms relieves itself after a while  Radiation: none  Timing: no specific timing   Severity: distressing to patient    Throat lozenges can help at times  Noticed that she has had white spots  in back of throat as well  She has had new sexual partner recently    Patient sore throat symptoms have caused her to lose weight she states about 14 pounds.    She has a history of C diff and she thinks that she probably has that again based on smell, and characteristics of stool.  Has C. Diff test pended from Dr. Lincoln     Review of Systems   Constitutional, HEENT, cardiovascular, pulmonary, GI, , musculoskeletal, neuro, skin, endocrine and psych systems are negative, except as otherwise noted.      Objective       Vitals:  No vitals were obtained today due to virtual visit.    Physical Exam   Tearful  PSYCH: Alert and oriented times 3; coherent speech, normal   rate and volume, able to articulate logical thoughts, able   to abstract reason, no tangential thoughts, no hallucinations   or delusions  Her affect is normal  RESP: No cough, no audible wheezing, able to talk in full sentences  Remainder of exam unable to be completed due to telephone visits      Phone call duration: 40 minutes was spent on the phone counseling patient

## 2022-09-28 NOTE — TELEPHONE ENCOUNTER
"Ana Laura is calling regarding her Covid results - She wasn't sure of what she saw on her MyChart    Notified of Negative results    Triage offered    Ana Laura reports symptoms that started last Thu, 9/22/22 and have progressively worsened    *Of Note - Chart Review shows recent Bactrim order on 9/19/22 for UTI - This was not found/discussed during Triage call    She had a e-Visit and has tested Negative for Strep & Neg for Covid    - Fatigue - severe \"I can't deal with the fatigue; I have to lay down\"  - Sore throat - rated 7/10  - Body aches  - 2 episodes of Bilateral back pain - top of back to lower back - lasts just a few seconds  - Diarrhea - started today - 4 episodes today  - Tonsils are red, inflamed - coated with white mucus  - Hx of oral Thrush - Reports painful white patches in mouth    2 days ago, she took Diflucan 100 mg  - was previously on it daily for years due to oral thrush    ~Noon - Temp = 100.2   Currently - Temp - 98.4     Tried, without relief:  - Epsom salt bath  - OTC throat lozenges  - Ibuprofen - last taken ~1pm    Advised to see PCP within 24 hours    Warm transferred to , Tanja Ferrera RN  [facility]      Reason for Disposition    [1] MODERATE weakness (i.e., interferes with work, school, normal activities) AND [2] persists > 3 days    Additional Information    Negative: SEVERE difficulty breathing (e.g., struggling for each breath, speaks in single words)    Negative: Shock suspected (e.g., cold/pale/clammy skin, too weak to stand, low BP, rapid pulse)    Negative: Difficult to awaken or acting confused (e.g., disoriented, slurred speech)    Negative: [1] Fainted > 15 minutes ago AND [2] still feels too weak or dizzy to stand    Negative: [1] SEVERE weakness (i.e., unable to walk or barely able to walk, requires support) AND [2] new-onset or worsening    Negative: Sounds like a life-threatening emergency to the triager    Negative: Difficulty " "breathing    Negative: Heart beating < 50 beats per minute OR > 140 beats per minute    Negative: Extra heart beats OR irregular heart beating  (i.e., \"palpitations\")    Negative: Follows bleeding (e.g., from vomiting, rectum, vagina; Exception: small brief weakness from sight of a small amount blood)    Negative: Black or tarry bowel movements    Negative: [1] Drinking very little AND [2] dehydration suspected (e.g., no urine > 12 hours, very dry mouth, very lightheaded)    Negative: Patient sounds very sick or weak to the triager    Negative: [1] MODERATE weakness (i.e., interferes with work, school, normal activities) AND [2] cause unknown  (Exceptions: weakness with acute minor illness, or weakness from poor fluid intake)    Negative: [1] MODERATE weakness AND [2] from poor fluid intake AND [3] no improvement after 2 hours of rest and fluids    Negative: [1] Fever > 103 F (39.4 C) AND [2] not able to get the fever down using Fever Care Advice    Negative: [1] Fever > 101 F (38.3 C) AND [2] age > 60 years    Negative: [1] Fever > 100.0 F (37.8 C) AND [2] bedridden (e.g., nursing home patient, CVA, chronic illness, recovering from surgery)    Negative: [1] Fever > 100.0 F (37.8 C) AND [2] diabetes mellitus or weak immune system (e.g., HIV positive, cancer chemo, splenectomy, organ transplant, chronic steroids)    Negative: Pale skin (pallor)    Negative: SEVERE difficulty breathing (e.g., struggling for each breath, speaks in single words, stridor)    Negative: Sounds like a life-threatening emergency to the triager    Negative: [1] Drooling or spitting out saliva (because can't swallow) AND [2] normal breathing    Negative: Unable to open mouth completely    Negative: [1] Difficulty breathing AND [2] not severe    Negative: Fever > 104 F (40 C)    Negative: [1] Refuses to drink anything AND [2] for > 12 hours    Negative: [1] Drinking very little AND [2] dehydration suspected (e.g., no urine > 12 hours, very dry " "mouth, very lightheaded)    Negative: Patient sounds very sick or weak to the triager    Negative: SEVERE (e.g., excruciating) throat pain    Negative: [1] Pus on tonsils (back of throat) AND [2]  fever AND [3] swollen neck lymph nodes (\"glands\")    Negative: [1] Rash AND [2] widespread (especially chest and abdomen)    Negative: Earache also present    Negative: Fever present > 3 days (72 hours)    Negative: Diabetes mellitus or weak immune system (e.g., HIV positive, cancer chemo, splenectomy, organ transplant, chronic steroids)    Negative: History of rheumatic fever    Negative: [1] Adult is leaving on a trip AND [2] requests an antibiotic NOW    Negative: [1] Positive throat culture or rapid strep test (according to lab, PCP, caller, etc.) AND [2] NO  standing order to call in prescription for antibiotic    Negative: [1] Exposure to family member (or spouse or boyfriend/girlfriend) with test-proven strep AND [2] within last 10 days    Negative: [1] Sore throat is the only symptom AND [2] present > 48 hours    Negative: [1] Sore throat with cough/cold symptoms AND [2] present > 5 days    Negative: [1] Sore throat is the only symptom AND [2] sore throat present < 48 hours    Negative: [1] Sore throat with cough/cold symptoms AND [2] present < 5 days    Negative: [1] Positive throat culture or rapid strep test (according to lab, PCP, caller, etc.) AND [2] standing order to call in prescription for antibiotic    Protocols used: WEAKNESS (GENERALIZED) AND FATIGUE-A-AH, SORE THROAT-A-AH      "

## 2022-09-28 NOTE — TELEPHONE ENCOUNTER
Call to pt and advised. She agrees. She is still sick so has VV appt this afternoon for her fevers and sore throat

## 2022-10-03 NOTE — PROGRESS NOTES
Medication Therapy Management (MTM) Encounter    ASSESSMENT:                            Medication Adherence/Access: No issues identified    Bipolar 1 Disorder: Today, patient expresses that she is not interested in medication changes. She feels her medications are working well and are more effective and better tolerated than past trials. Writer included some details of past medication trials below, will connect with psychiatrist to see if additional information would be helpful at this time.     Hyperlipidemia: Stable.    Hypertension: Stable. Patient is meeting blood pressure goal of < 140/90mmHg.    GERD: Stable.    Hypothyroidism: Stable. Last TSH is within normal limits.     Constipation: Stable    Contraception: Stable    UTI ppx: Stable     Supplements: Stable      PLAN:                            I will connect with your psychiatry provider about your current and past medications. You indicated today that you prefer not to make any medication changes.     Follow-up: as needed    SUBJECTIVE/OBJECTIVE:                          Ana Laura Wharton is a 51 year old female called for an initial visit. She was referred to me from psychiatry.      Reason for referral: Patient with extensive med history that is limited historian needing review of previous trials and further recommendations/options.    Allergies/ADRs: Reviewed in chart - Lamictal caused rash, Abilify caused rash?  Past Medical History: Reviewed in chart  Tobacco: She reports that she has been smoking cigarettes. She has been smoking about 1.00 pack per day. She has never used smokeless tobacco.Nicotine/Tobacco Cessation Plan:   did not discuss today  Alcohol: last use 1 month ago      Medication Adherence/Access: no issues reported    Bipolar 1 Disorder:  Current medications:   Remeron 60mg nightly  Olanzapine 25mg in the evening + 5mg three times daily prn (takes 5mg consistently in the morning, takes an additional 5mg occassionly if needed)  Clonazepam  "0.5mg in the morning and 2mg nightly (planning taper)  Hydroxyzine 50mg three times daily as needed (takes consistently 50mg in the morning and 100mg nightly)    Ana Laura is a 51 year old with psychiatric history of bipolar 1 disorder. She has been seen in clinic since at least 2012. She saw Dr. Rodriguez until 2021 when she transitioned to the resident clinic.  Most recent visit was 9/15 with Rosi Donis MD at which time it appears Klonopin was increased to 1mg QAM and 2mg nightly. However, patient reports she is taking 0.5mg in the morning and plans to taper.     In regards to her medication, patient reports she feels like her medications are working well and she expresses that she is really not interested in medication changes. She reports she has been on \"every medication out there\" and feels like her current regimen is more helpful than medications she has taken in the past. Appears she has been on her current regimen since around 2019. Today, she denies current depression, anxiety or linda symptoms. She does report h/o alcohol use disorder with most recent alcohol use about 1 month ago. She endorses side effects of dry mouth and anorganism, but experienced more bothersome side effects to other medications in the past.     Past medication trials:     PSYCH and SUBSTANCE USE Critical Summary Points since July 2022 08/11/2022- DA, fluoxetine 10 mg started, acutely worsened depression with SI   08/11/2022- decreased clonazepam to 1 mg in the AM (was 1.5 mg qAM)    08/25/2022- decreased clonazepam to 0.5 mg in the AM   09/15/2022- stopped fluoxetine due to causing cognition/memory issues. Increased AM clonazepam back to 1.0 mg.         PAST MED TRIALS (copied forward)       Medication Max Dose (mg) Dates / Duration Helpful? DC Reason / Adverse Effects?   sildenifil, tadalafil     N Lack of efficacy   temazepam     N Hallucinations, nightmares.    quetiapine   2017  N Severe weight gain   gabapentin     N " "Cognitive impairment, depression   trazodone     Y Initially worked, then lost efficacy   lithium     Y Discontinued due to thyroid problems and tremor   divalproex     N Cognitive impairment, depression   risperidone     N Worsened depression   lamotrigine     N Skin peeling acute reaction   aripiprazole     N Akathesia, possible rash   topiramate     N Visual problems,   bupropion     N     fluoxetine           paroxetine           sertraline     N Worsened mood   escitalopram     N     citalopram     N      venlafaxine     N     amitriptyline     N     nortripyline     N     alprazolam           lorazepam           zolpidem     N     eszopiclone     N     ramelton           clonazepam   current Y     olanzapine   current Y     hydroxyzine   current Y     mirtazapine   current Y      buproprion 200  2012   N Emotional blunting     buspirone  45 2019  N Worsening depression and SI   ziprazidone 160 8154-7460 Y Waning efficacy   lurasidone   2015       carbamezapine  400 2015    fatigue         Select past medication trials reviewed previously by MTM :    - Wellbutrin SR up to 100mg BID, trialed in 2012 and stopped because it made her feel \"out of touch with life\" per chart review  - Buspar up to 22.5mg BID, trialed in 2019 and stopped to due concern of worsening depression and suicidal ideation  - Seroquel, trialed in 2017, stopped due to \"gaining 55 pounds\". She is not willing to retry Seroquel  -Abilify, trialed in 2010, listed as a medication allergy due to rash. Per 11/25/2010 hospital note, it appears pt experienced a rash while on Lamictal +Abilify under the care of Dr. Karimi at [facility]. Retrial of Abilify may have led to recurrent rash.   - Geodon up to 160mg, trialed in 4746-4139, stopped due to waning efficacy  - Latuda, trials in 2015, unclear reason for stopping      Hyperlipidemia: Current therapy includes atorvastatin 10mg daily.  Patient reports no significant myalgias or other side effects.  The " 10-year ASCVD risk score (Andre CORTEZ Jr., et al., 2013) is: 4.4%    Values used to calculate the score:      Age: 51 years      Sex: Female      Is Non- : No      Diabetic: No      Tobacco smoker: Yes      Systolic Blood Pressure: 118 mmHg      Is BP treated: Yes      HDL Cholesterol: 43 mg/dL      Total Cholesterol: 163 mg/dL  Recent Labs   Lab Test 01/19/22  0954 01/18/21  1150   CHOL 163 183   HDL 43* 47*   LDL 99 115*   TRIG 104 103       Hypertension: Current medications include Coreg 12.5mg bid.  Patient reports no current medication side effects.  BP Readings from Last 3 Encounters:   06/21/22 118/78   04/25/22 120/82   04/13/22 110/68     GERD: Current medications include: Protonix (pantoprazole) 40mg daily. Patient feels that current regimen is effective.    Hypothyroidism: Patient is taking levothyroxine 50 mcg daily. Patient is having the following symptoms: none.   TSH   Date Value Ref Range Status   08/18/2022 3.59 0.30 - 4.20 uIU/mL Final   01/19/2022 2.21 0.40 - 4.00 mU/L Final   01/18/2021 1.28 0.40 - 4.00 mU/L Final       Constipation: Takes senna and miralax daily. Finds this effective.     Contraception: Norethindrone daily    UTI ppx: Takes Macrobid daily.     Supplements: daily multivitamin and folic acid    ----------------      I spent 20 minutes with this patient today. A copy of the visit note was provided to the patient's provider(s).    The patient was sent via [employer] a summary of these recommendations.     Pratibha Fox, PharmD, BCPP  Medication Therapy Management Pharmacist  [facility]    Telemedicine Visit Details  Type of service:  Telephone visit  Start Time: 9:30 AM  End Time: 9:50 AM  Originating Location (patient location): Home  Distant Location (provider location):  [facility]     Medication Therapy Recommendations  No medication therapy recommendations to display

## 2022-10-04 NOTE — Clinical Note
Tunde Aranda,   Thank you for the referral. I met with patient this morning and she expressed that she really doesn't want to make any medication changes and felt like her symptoms are well-controlled. I see this is probably different than how she was at your last visit with her. Anyway, I included some past med trials that I found in previous notes. I can certainly dig into past med trials further if you feel like that would be helpful. Let me know if there are other pieces of info you are hoping for. I really wasn't sure how far to dig since she was pretty against any thought of med changes today.   Thank you,   Pratibha

## 2022-10-04 NOTE — PATIENT INSTRUCTIONS
"Recommendations from today's MTM visit:                                                      I will connect with your psychiatry provider about your current and past medications. You indicated today that you prefer not to make any medication changes.     It was great speaking with you today.  I value your experience and would be very thankful for your time in providing feedback in our clinic survey. In the next few days, you may receive an email or text message from [employer] with a link to a survey related to your  clinical pharmacist.\"     To schedule another MTM appointment, please call the clinic directly or you may call the MTM scheduling line at [phone] or toll-free at [phone].     My Clinical Pharmacist's contact information:                                                      Please feel free to contact me with any questions or concerns you have.      Pratibha Fox, PharmD, BCPP  Medication Therapy Management Pharmacist  [facility]     "

## 2022-10-05 NOTE — TELEPHONE ENCOUNTER
"Received tearful call from patient. She states she is \"really not great.\" Patient reports she is in an \"active mixed state.\" She reports she is \"very down\" and that she just wants to withdraw her money and move to [place].    She states she is trying to do everything she is told and taking meds as prescribed, but states \"my medications work when I am sober, but not when I am drunk.\" Patient reports she has been drinking since yesterday. She was unable to quantify, but stated she has drinking \"a lot.\"    Patient denies SI, SIB, HI and hallucinations, but wants Dr. Donis to know she is \"severly distressed.\" She states she has been talking to her support system today, but just wants to be drunk today, despite encouragement to participate in other activities. Patient does state she took her 0.5 mg clonzepam this morning and writer reminded patient of safety risks of mixing this with alcohol. Patient verbalized understanding.     Patient states that she would like to see a therapist, but is waiting on recommendations for a therapist in [place]. Writer also encouraged patient to reach out to different therapists in her area, as she reported her insurance will cover anywhere she wants to go.    Writer spoke with patient about utilizing a mobile crisis team, [facility] or the ED if safety concerns arise. Patient states she will call her friend, Devin at 1:00 when he wakes up for support. Patient agrees to another check in call with writer between 2-4 today.  "

## 2022-10-05 NOTE — TELEPHONE ENCOUNTER
Writer called patient back. Patient states Devin is with her currently, but states she is still drinking. Patient continues to deny SI, SIB, HI, hallucinations, but still states that she is not doing well.     Writer encouraged patient to give the alcohol to Devin when he leaves for the night, but patient unsure whether or not she will do this. Writer reminded patient of statement made earlier about medications working when she is not drinking.      Writer spoke with Devin who is concerned about patient's current state. He states that he will be with patient until 6 or 7PM when he has to go to work. Devin states he is at a loss for what to do to help her. Writer reassured Devin that just being there with patient was helpful and encouraged him to bring patient to the nearest ED or call 911 if acute concerns. He also agrees to try to remove alcohol from patient's home.     Spoke with patient again who states she is willing to go to the ED if safety concerns arise and states she has many people she could call to bring her.

## 2022-10-05 NOTE — TELEPHONE ENCOUNTER
----- Message from Asepn Livingston PA-C sent at 11/10/2021  8:41 PM CST -----  Please notify the patient of stable lab results for continuation of sulfasalazine, some improvement in inflammatory marker, ESR.  Follow-up as planned.   SW placed call to Ana Laura to follow up on request for help with finding therapy resources.    Writer utilized [place] Fast Tracker with criteria of near her home, taking her insurance, experience in bipolar disorder, and offering appointments within next two weeks.     Provided following resources:  -[facility] [phone]  [facility] [phone]  [facility] [phone]    Ana Laura states she is able to call on her own. She agreed to let writer follow up in 1 week.    YAHAIRA Stevenson, [facility]  [phone]

## 2022-10-06 NOTE — PATIENT INSTRUCTIONS
- decrease clonazepam to 0.5 mg in AM and 1.0 mg at night  - follow-up in 1 week     **For crisis resources, please see the information at the end of this document**   Patient Education    Thank you for coming to the [facility] [place] [facility].     Lab Testing:  If you had lab testing today and your results are reassuring or normal they will be mailed to you or sent through [employer] within 7 days. If the lab tests need quick action we will call you with the results. The phone number we will call with results is # [phone]. If this is not the best number please call our clinic and change the number.     Medication Refills:  If you need any refills please call your pharmacy and they will contact us. Our fax number for refills is [phone].   Three business days of notice are needed for general medication refill requests.   Five business days of notice are needed for controlled substance refill requests.   If you need to change to a different pharmacy, please contact the new pharmacy directly. The new pharmacy will help you get your medications transferred.     Contact Us:  Please call [phone] during business hours (8-5:00 M-F).   If you have medication related questions after clinic hours, or on the weekend, please call [phone].     Financial Assistance [phone]   Medical Records [phone]       MENTAL HEALTH CRISIS RESOURCES:  For a emergency help, please call 911 or go to the nearest Emergency Department.     Emergency Walk-In Options:   [facility] Unit @ [facility] ([place]): [phone] - Specialized mental health emergency area designed to be calming  [facility] ([place]): [phone]  [facility] ([place]): [phone]  [facility]): [phone]    [facility] Crisis Information:   [place]: [phone]  [place]: [phone]  [place] ([place]) - Adult: [phone]     Child:  [phone]  [place] - Adult: [phone]     Child: [phone]  [place]: [phone]  List of all [facility] resources:   https://[place].gov/dhs/people-we-serve/adults/health-care/mental-health/resources/crisis-contacts.jsp    National Crisis Information:   Crisis Text Line: Text  [place]  to 145661  Suicide & Crisis Lifeline: 988  National Suicide Prevention Lifeline: 1-894-635-TALK (1-321.967.7211)       For online chat options, visit https://suicidepreventionlifeline.org/chat/  Poison Control Center: 8-533-100-7610  Trans Lifeline: 1-709.663.7901 - Hotline for transgender people of all ages  [employer]: 5-577-149-6171 - Hotline for LGBT youth     For Non-Emergency Support:   Fast Tracker: Mental Health & Substance Use Disorder Resources -   https://www.[employer].org/
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
## 2022-10-13 NOTE — PATIENT INSTRUCTIONS
- Decrease clonazepam to 0.5 mg at night   - All other medications remain the same  - Follow-up appointment on October 27 at 3:40 PM via video     **For crisis resources, please see the information at the end of this document**   Patient Education    Thank you for coming to the [facility] [place] [facility].     Lab Testing:  If you had lab testing today and your results are reassuring or normal they will be mailed to you or sent through [employer] within 7 days. If the lab tests need quick action we will call you with the results. The phone number we will call with results is # [phone]. If this is not the best number please call our clinic and change the number.     Medication Refills:  If you need any refills please call your pharmacy and they will contact us. Our fax number for refills is [phone].   Three business days of notice are needed for general medication refill requests.   Five business days of notice are needed for controlled substance refill requests.   If you need to change to a different pharmacy, please contact the new pharmacy directly. The new pharmacy will help you get your medications transferred.     Contact Us:  Please call [phone] during business hours (8-5:00 M-F).   If you have medication related questions after clinic hours, or on the weekend, please call [phone].     Financial Assistance [phone]   Medical Records [phone]       MENTAL HEALTH CRISIS RESOURCES:  For a emergency help, please call 911 or go to the nearest Emergency Department.     Emergency Walk-In Options:   Em[facility] Unit @ [facility] ([place]): [phone] - Specialized mental health emergency area designed to be calming  [facility] ([place]): [phone]  [facility] ([place]): [phone]  [facility]): [phone]    [facility] Crisis Information:   [place]: [phone]  [place]:  [phone]  [place] (COPE) - Adult: [phone]     Child: [phone]  [place] - Adult: [phone]     Child: [phone]  [place]: [phone]  List of all [facility] resources:   https://[place].gov/dhs/people-we-serve/adults/health-care/mental-health/resources/crisis-contacts.jsp    National Crisis Information:   Crisis Text Line: Text  [place]  to 565400  Suicide & Crisis Lifeline: 988  National Suicide Prevention Lifeline: 0-560-786-TALK (1-909.664.9627)       For online chat options, visit https://suicidepreventionlifeline.org/chat/  Poison Control Center: 1-671.222.6564  Trans Lifeline: 1-657.462.9436 - Hotline for transgender people of all ages  [employer]: 4-820-435-3447 - Hotline for LGBT youth     For Non-Emergency Support:   Fast Tracker: Mental Health & Substance Use Disorder Resources -   https://www.[employer].org/
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
## 2022-10-13 NOTE — PROGRESS NOTES
Ana Laura Wharton is a 51 year old who is being evaluated via a billable video visit.      Pt will join video visit via: [employer]  If there are problems joining the visit, send backup video invite via: Send to preferred e-mail: [email or web address]    Reason for telehealth visit: Patient has requested telehealth visit    Originating location (patient location): Patient's home    Will anyone else be joining the visit? No

## 2022-10-17 NOTE — TELEPHONE ENCOUNTER
"Received call from patient. Patient very tearful and states she is not doing well. Patient reporting severe depression, anxiety, suicidal ideation. Patient states \"I'm trying to go off benzos at night and can't sleep.\" Patient states she has not reduced clonazepam dosage yet. Patient states she has so much anxiety during the day and states she is up all night. Patient states she does not want to disappoint Dr. Donis, but states she does not know what else she is supposed to do. Patient reports her SI is passive and she adamantly denies any plan or intent. Patient reports that she has continued to drink and she has been drinking today. Writer reminded patient of dangers of mixing alcohol with benzodiazepines as well as patients statement recently about her medications working when she is not drinking. Patient reports a safety plan of calling support system if feeling unsafe. Patient reports she is unwilling to go to the hospital.     Patient reports that it was her birthday over the weekend and it was \"the worst birthday I've ever had.\" Patient reports she had a negative encounter with a former romantic partner.     Patient states she is concerned about finances and states she is fearful of losing her social security and disability. She states that she wants Dr. Donis to know that emotionally, she cannot hold down any job.     Patient reports she has a group tonight with her support system. She states her main emergency contact is Devin and he was with her last night, but states that this was not helpful as he \"doesn't know what to do.\" Writer asked patient if there's something she could identify that would be helpful for her that her supports are not doing and patient responded \"helping me financially.\" Patient states she asked her ex  to help with her finances and he told her no. Patient unable to identify any other ways that her supports could help her right now.     Patients states that she has not " followed up with therapy recommendations from [facility] due to being unable to read her notes. Writer will send [employer] message with information.

## 2022-10-19 NOTE — TELEPHONE ENCOUNTER
"Requested Prescriptions   Pending Prescriptions Disp Refills     carvedilol (COREG) 12.5 MG tablet [Pharmacy Med Name: Carvedilol 12.5 MG Oral Tablet] 200 tablet 2     Sig: TAKE 1 TABLET BY MOUTH  TWICE DAILY WITH MEALS       Beta-Blockers Protocol Passed - 10/15/2022  9:34 PM        Passed - Blood pressure under 140/90 in past 12 months     BP Readings from Last 3 Encounters:   06/21/22 118/78   04/25/22 120/82   04/13/22 110/68                 Passed - Patient is age 6 or older        Passed - Recent (12 mo) or future (30 days) visit within the authorizing provider's specialty     Patient has had an office visit with the authorizing provider or a provider within the authorizing providers department within the previous 12 mos or has a future within next 30 days. See \"Patient Info\" tab in inbasket, or \"Choose Columns\" in Meds & Orders section of the refill encounter.              Passed - Medication is active on med list           atorvastatin (LIPITOR) 10 MG tablet [Pharmacy Med Name: Atorvastatin Calcium 10 MG Oral Tablet] 100 tablet 2     Sig: TAKE 1 TABLET BY MOUTH  DAILY       Statins Protocol Passed - 10/15/2022  9:34 PM        Passed - LDL on file in past 12 months     Recent Labs   Lab Test 01/19/22  0954   LDL 99             Passed - No abnormal creatine kinase in past 12 months     Recent Labs   Lab Test 05/13/19  0512                   Passed - Recent (12 mo) or future (30 days) visit within the authorizing provider's specialty     Patient has had an office visit with the authorizing provider or a provider within the authorizing providers department within the previous 12 mos or has a future within next 30 days. See \"Patient Info\" tab in inbasket, or \"Choose Columns\" in Meds & Orders section of the refill encounter.              Passed - Medication is active on med list        Passed - Patient is age 18 or older        Passed - No active pregnancy on record        Passed - No positive pregnancy " "test in past 12 months           levothyroxine (SYNTHROID/LEVOTHROID) 50 MCG tablet [Pharmacy Med Name: MYLAN-LEVOTHYROX 50MCG TABLET] 100 tablet 2     Sig: TAKE 1 TABLET BY MOUTH  DAILY       Thyroid Protocol Passed - 10/15/2022  9:34 PM        Passed - Patient is 12 years or older        Passed - Recent (12 mo) or future (30 days) visit within the authorizing provider's specialty     Patient has had an office visit with the authorizing provider or a provider within the authorizing providers department within the previous 12 mos or has a future within next 30 days. See \"Patient Info\" tab in inbasket, or \"Choose Columns\" in Meds & Orders section of the refill encounter.              Passed - Medication is active on med list        Passed - Normal TSH on file in past 12 months     Recent Labs   Lab Test 08/18/22  0806   TSH 3.59              Passed - No active pregnancy on record     If patient is pregnant or has had a positive pregnancy test, please check TSH.          Passed - No positive pregnancy test in past 12 months     If patient is pregnant or has had a positive pregnancy test, please check TSH.               "

## 2022-10-24 NOTE — ED TRIAGE NOTES
Pt states that this has been building up for several months     Triage Assessment     Row Name 10/24/22 1139       Triage Assessment (Adult)    Airway WDL WDL       Respiratory WDL    Respiratory WDL WDL       Skin Circulation/Temperature WDL    Skin Circulation/Temperature WDL WDL       Cardiac WDL    Cardiac WDL WDL       Cognitive/Neuro/Behavioral WDL    Cognitive/Neuro/Behavioral WDL X;mood/behavior    Mood/Behavior anxious  weeping

## 2022-10-24 NOTE — ED NOTES
"ED to Behavioral Floor Handoff    SITUATION  Ana Laura Wharton is a 52 year old female who speaks English and lives in a home unknown The patient arrived in the ED by private car from home with a complaint of Suicidal (Pt is feeling suicidal)  .The patient's current symptoms started/worsened a long time ago and during this time the symptoms have remained the same.   In the ED, pt was diagnosed with   Final diagnoses:   Alcohol dependence with uncomplicated withdrawal (H)   Anxiety   Severe episode of recurrent major depressive disorder, without psychotic features (H)        Initial vitals were: BP: (!) 195/105 (pt weeping when this was taken)  Pulse: 115  Temp: 99.1  F (37.3  C)  Resp: 20  Height: 167.6 cm (5' 6\")  Weight: 58.5 kg (129 lb)  SpO2: 97 %   --------  Is the patient diabetic? No   If yes, last blood glucose? --     If yes, was this treated in the ED? --  --------  Is the patient inebriated (ETOH) no or Impaired on other substances? No  MSSA done? Yes  Last MSSA score: 10    Were withdrawal symptoms treated? Yes  Does the patient have a seizure history? No. If yes, date of most recent seizure--  --------  Is the patient patient experiencing suicidal ideation? reports the following suicide factors: suicidal thoughts with plans, rachel for safety in hospital    Homicidal ideation? denies current or recent homicidal ideation or behaviors.    Self-injurious behavior/urges? denies current or recent self injurious behavior or ideation.  ------  Was pt aggressive in the ED No  Was a code called No  Is the pt now cooperative? Yes  -------  Meds given in ED:   Medications   diazepam (VALIUM) tablet 5-20 mg (10 mg Oral Given 10/24/22 1459)   atenolol (TENORMIN) tablet 50 mg (50 mg Oral Given 10/24/22 1500)   thiamine (B-1) tablet 100 mg (100 mg Oral Given 10/24/22 1500)   folic acid (FOLVITE) tablet 1 mg (1 mg Oral Given 10/24/22 1500)   multivitamin w/minerals (THERA-VIT-M) tablet 1 tablet (1 tablet Oral Given " "10/24/22 1500)   hydrOXYzine (ATARAX) tablet 50 mg (has no administration in time range)      Family present during ED course? Yes  Family currently present? Yes    BACKGROUND  Does the patient have a cognitive impairment or developmental disability? No  Allergies:   Allergies   Allergen Reactions     Ciprofloxacin Other (See Comments)     Joint pain cdiff     Compazine [Prochlorperazine] Other (See Comments)     dystonia     Metoclopramide Other (See Comments)     \"I feel like I am crawling out of my skin\"     Reglan [Metoclopramide Hcl] Other (See Comments)     Sensation of \"crawling out of skin\"     Restoril [Temazepam]      Thorazine [Chlorpromazine] Other (See Comments)     dystonia     Abilify [Aripiprazole] Rash     Lamotrigine Rash     Severe drug rash - contraindication to receiving again. Skin peeling.    .   Social demographics are   Social History     Socioeconomic History     Marital status:      Spouse name: None     Number of children: None     Years of education: None     Highest education level: None   Tobacco Use     Smoking status: Every Day     Packs/day: 1.00     Types: Cigarettes     Smokeless tobacco: Never   Vaping Use     Vaping Use: Never used   Substance and Sexual Activity     Alcohol use: Yes     Alcohol/week: 2.0 standard drinks     Types: 2 Glasses of wine per week     Comment: at times 1/2 bottle of vodka     Drug use: Not Currently     Frequency: 0.5 times per week     Comment: occ     Sexual activity: Yes     Partners: Male     Birth control/protection: OCP     Social Determinants of Health     Transportation Needs: No Transportation Needs     Lack of Transportation (Medical): No     Lack of Transportation (Non-Medical): No        ASSESSMENT  Labs results   Labs Ordered and Resulted from Time of ED Arrival to Time of ED Departure   COMPREHENSIVE METABOLIC PANEL - Abnormal       Result Value    Sodium 139      Potassium 3.9      Chloride 106      Carbon Dioxide (CO2) 24      " Anion Gap 9      Urea Nitrogen 9      Creatinine 0.56      Calcium 9.1      Glucose 93      Alkaline Phosphatase 188 (*)     AST 81 (*)     ALT 46      Protein Total 8.1      Albumin 4.4      Bilirubin Total 0.9      GFR Estimate >90     CBC WITH PLATELETS AND DIFFERENTIAL - Abnormal    WBC Count 11.9 (*)     RBC Count 4.18      Hemoglobin 13.5      Hematocrit 38.7      MCV 93      MCH 32.3      MCHC 34.9      RDW 15.5 (*)     Platelet Count 191      % Neutrophils 58      % Lymphocytes 28      % Monocytes 11      % Eosinophils 1      % Basophils 1      % Immature Granulocytes 1      NRBCs per 100 WBC 0      Absolute Neutrophils 7.0      Absolute Lymphocytes 3.3      Absolute Monocytes 1.3      Absolute Eosinophils 0.2      Absolute Basophils 0.1      Absolute Immature Granulocytes 0.1      Absolute NRBCs 0.0     ROUTINE UA WITH MICROSCOPIC REFLEX TO CULTURE - Abnormal    Color Urine Straw      Appearance Urine Clear      Glucose Urine Negative      Bilirubin Urine Negative      Ketones Urine Negative      Specific Gravity Urine 1.005      Blood Urine Negative      pH Urine 6.5      Protein Albumin Urine Negative      Urobilinogen Urine Normal      Nitrite Urine Negative      Leukocyte Esterase Urine Negative      Bacteria Urine Few (*)     RBC Urine <1      WBC Urine 1      Squamous Epithelials Urine 1     COVID-19 VIRUS (CORONAVIRUS) BY PCR - Normal    SARS CoV2 PCR Negative     HCG QUALITATIVE URINE - Normal    hCG Urine Qualitative Negative     DRUG ABUSE SCREEN 1 URINE (ED) - Normal    Amphetamines Urine Screen Negative      Barbiturates Urine Screen Negative      Benzodiazepines Urine Screen Negative      Cannabinoids Urine Screen Negative      Cocaine Urine Screen Negative      Opiates Urine Screen Negative     INR - Normal    INR 1.14     ALCOHOL BREATH TEST POCT - Normal    Alcohol Breath Test 0.00        Imaging Studies: No results found for this or any previous visit (from the past 24 hour(s)).   Most  "recent vital signs /88   Pulse 88   Temp 98.4  F (36.9  C) (Oral)   Resp 16   Ht 1.676 m (5' 6\")   Wt 58.5 kg (129 lb)   LMP 01/13/2018 (Exact Date)   SpO2 98%   BMI 20.82 kg/m     Abnormal labs/tests/findings requiring intervention:---   Pain control: pt had none  Nausea control: pt had none    RECOMMENDATION  Are any infection precautions needed (MRSA, VRE, etc.)? No If yes, what infection? --  ---  Does the patient have mobility issues? independently. If yes, what device does the pt use? ---  ---  Is patient on 72 hour hold or commitment? No If on 72 hour hold, have hold and rights been given to patient? N/A  Are admitting orders written if after 10 p.m. ?N/A  Tasks needing to be completed: See orders    Beny Capps RN    [phone] [facility]   "

## 2022-10-24 NOTE — PHARMACY-ADMISSION MEDICATION HISTORY
Admission Medication History Completed by Pharmacy    See [facility] Admission Navigator for allergy information, preferred outpatient pharmacy, prior to admission medications and immunization status.     Medication History Sources:     Patient, dispense report    Changes made to PTA medication list (reason):    Added: None    Deleted: None    Changed: per patient and dispense report  o Removed note from pantoprazole 40 mg tablets that stated patient was taking 1 tablet once daily -> reset to prescribed value of 1 tablet 2 times a day    Additional Information:    Patient reports stopping use of Proair inhaler a long time ago, she no longer has the inhaler    Patient reports tapering off clonazepam. Taper ended a few days ago    Dispense report lists cyclobenzoprine sig to be 1 tablet po TID prn; patient reports taking 1 tablet po at bedtime     Patient reports concern about not being given norethindrone yet in the hospital. She expresses a desire to continue this medication while admitted for migraine.     Patient could not recall taking sumatriptan 25 mg tablets    Fluoxetine 10 mg capsules were dispensed on 9/2/22. Confirmed with patient she is not taking this medication anymore    Prior to Admission medications    Medication Sig Last Dose Taking? Auth Provider Long Term End Date   acetaminophen (TYLENOL) 500 MG tablet Take 1-2 tablets (500-1,000 mg) by mouth every 6 hours as needed for mild pain Past Week Yes Liborio Lincoln MD No    atorvastatin (LIPITOR) 10 MG tablet TAKE 1 TABLET BY MOUTH  DAILY 10/23/2022 Yes Liborio Lincoln MD Yes    carvedilol (COREG) 12.5 MG tablet TAKE 1 TABLET BY MOUTH  TWICE DAILY WITH MEALS 10/23/2022 Yes Liborio Lincoln MD Yes    cyclobenzaprine (FLEXERIL) 10 MG tablet Take 1 tablet (10 mg) by mouth At Bedtime 10/22/2022 Yes Liborio Lincoln MD No    folic acid (FOLVITE) 400 MCG tablet Take 400 mcg by mouth daily 10/23/2022 Yes Unknown, Entered By History     hydrOXYzine  Subjective   Patient ID: Stephanie is a 47 ATARAX) 50 MG tablet Take 1 tablet (50 mg) by mouth 3 times daily as needed for anxiety or other (sleep) 10/23/2022 Yes Rosi Donis MD No    ketorolac (TORADOL) 30 MG/ML injection Inject 30 mg into the muscle as needed Past Month Yes Reported, Patient No    levothyroxine (SYNTHROID/LEVOTHROID) 50 MCG tablet TAKE 1 TABLET BY MOUTH  DAILY 10/23/2022 Yes Liborio Lincoln MD Yes    mirtazapine (REMERON) 30 MG tablet Take 2 tablets (60 mg) by mouth At Bedtime 10/22/2022 Yes Umu Frausto MD Yes    Multiple Vitamins-Minerals (WOMENS MULTI VITAMIN & MINERAL PO) Take 1 tablet by mouth daily Past Week Yes Unknown, Entered By History     nitroFURantoin macrocrystal-monohydrate (MACROBID) 100 MG capsule Take 1 capsule (100 mg) by mouth See Admin Instructions Take 100 mg twice a day for one week, Then take 100 mg once daily 10/23/2022 Yes Luzma Morrison MD     norethindrone (MICRONOR) 0.35 MG tablet Take 1 tablet (0.35 mg) by mouth daily continuously 10/23/2022 Yes Celso Mcwilliams MD Yes    OLANZapine (ZYPREXA) 20 MG tablet Take 1 tablet (20 mg) by mouth At Bedtime along with one 5 mg tablet for total bedtime dose of 25 mg 10/22/2022 Yes Umu Frausto MD Yes    OLANZapine (ZYPREXA) 5 MG tablet Take 1 tablet (5 mg) by mouth At Bedtime. May also take 1 tablet (5 mg) 3 times daily as needed (anxiety, mixed symptoms). Do all this for 90 days. 10/23/2022 Yes Umu Frausto MD Yes 12/14/22   ondansetron (ZOFRAN) 4 MG tablet Take 1 tablet (4 mg) by mouth every 8 hours as needed for nausea Past Month Yes Liborio Lincoln MD     pantoprazole (PROTONIX) 40 MG EC tablet TAKE 1 TABLET BY MOUTH  TWICE DAILY Past Week Yes Liborio Lincoln MD     polyethylene glycol (MIRALAX) 17 g packet Take 1 packet by mouth daily as needed for constipation Past Month Yes Unknown, Entered By History     Sennosides-Docusate Sodium (SENNA S PO) Take 1 tablet by mouth daily 10/23/2022 Yes Reported, Patient    SUMAtriptan (IMITREX) 25 MG tablet Take 25 mg by mouth at onset of headache for migraine Unknown Yes Reported, Patient     albuterol (PROAIR HFA/PROVENTIL HFA/VENTOLIN HFA) 108 (90 Base) MCG/ACT inhaler Inhale 2 puffs into the lungs every 6 hours as needed for shortness of breath / dyspnea or wheezing  Patient not taking: Reported on 10/24/2022 Not Taking  Liborio Lincoln MD Yes    clonazePAM (KLONOPIN) 0.5 MG tablet Take 1 tablet (0.5 mg) by mouth every morning Take this in addition to other prescription for total of 1.0 mg in the morning.  Patient not taking: Reported on 10/24/2022 Not Taking  Umu Frausto MD Yes    clonazePAM (KLONOPIN) 1 MG tablet Take 1 tablet by mouth each morning and 2 tablets by mouth each bedtime for 2 weeks (Total Daily Dose 3mg). After two weeks, reduce dose down to 1/2 tablet by mouth each morning and 2 tablets by mouth each bedtime thereafter (Total Daily Dose 2.5mg).  Patient not taking: Reported on 10/24/2022 Not Taking  Shun Cantrell MD Yes    Emollient (HYDROPHOR) OINT Externally apply topically daily  Patient not taking: Reported on 10/24/2022 Not Taking  Liborio Lincoln MD     Syringe/Needle, Disp, (SYRINGE LUER LOCK) 25G X 1\" 3 ML MISC 1 Act once a week   Liborio Lincoln MD       Date completed: 10/24/22    Medication history completed by: Iliana Mesa            "
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
## 2022-10-24 NOTE — PLAN OF CARE
Ana Laura Wharton  October 24, 2022  Plan of Care Hand-off Note     Patient Care Path: Inpatient Mental Health/ Detox    Plan for Care:     Pt was in agreement with hospital admission for higher level of care due to immediate safety concerns for her physical and mental health.  spoke in depth with Dr. May and patient, while pt would highly benefit from further stabilization in her mental health, due to her historical medical diagnosis of cirrhosis,  current use of alcohol and present withdrawal symptoms, MI/CD Care appeared more appropriate at this time for withdrawal management and safety in this regard.    At time the of encounter, Dr. May was aware there was no immediate placement options through 3A MI/CD due to shortage of staffing rather than available beds. Intake was called and provided with some clinical information with name and MRN though intake requesting call back after 4PM to see if they are able to accept patient for placement or not. Dr. aMy and RN Beny is aware ED needs to call intake after 4PM.  At this time, pt is aware she will be boarding until further placement has been found. If pt is found to not be medically cleared or detox is not willing to accept patient for level of care, pt would benefit from in-patient mental health admission to ensure her safety.     Critical Safety Issues: Pt reports plan and intent to end her life through overdosing on alcohol and pills. Pt reports on going withdrawal symptoms due to recent decrease in her prescribed BZP and on going daily alcohol use.     Overview:  This patient is a child/adolescent: No    This patient has additional special visitor precautions: No    Legal Status: Voluntary, but likely holdable due to ideation with plan and intent    Contacts:    Baltazar Beltran: Contact [phone]    Psychiatry Consult:  Adult Psychiatry Consult requested related to worsening depression/ anxiety symptoms and recent changes in medications for anxiety.  Psychiatry IP Consult Order Placed: No Pt may obtain such level of care needs once admitted into MI/CD care. No consult placed at this time.     Updated RN and Attending Provider regarding plan of care.    ASPEN Smith

## 2022-10-24 NOTE — TELEPHONE ENCOUNTER
S: 5PM- ED MD called to request detox inpt for 52 yrs old female in the [place] ED.     B: Pt comes in seeking detox from alcohol.  Pt reports having 20 yrs of sobriety.  She has reports drinking off and on for the last year.  Pt reports drinking half a box of wine or alcohol daily since June.  Pt's last drink was yesterday.  She came in very anxious and shaky.  Pt reports that her Bipolar is not being managed at all.  Pt's PCP tapered her off of clonazepam.  She has nothing to control her anxiety and has not slept in the last 3 days.  Pt was making SI plans to drink a large amount of alcohol and overdose on meds yesterday.      Pt denies any hx of w/d seizures or DTs.  She can contract for safety on 3A.  She reports her anxiety is out of control.      Dr. Bach was consulted as Pt was off benzos for 3 days. Dr. Bach reported that it was okay to start Pt on valium.      Pt reported to  that she has a story of cirrhosis.  Labs are fine.  White blood counts are elevated but there is no infection.  Pt ambulates independently and is medically cleared.     COVID: negative  UTOX: negative  HCG: negative  CMP: AST 81!  Alkaline Phosphatase 188!  CBC: WBC11.9    A: Patient cleared and ready for behavioral bed placement: Yes     6:07pm- Dr. Ellison accepts for 3A/[facility] Admit.

## 2022-10-24 NOTE — ED PROVIDER NOTES
ED Provider Note  [facility]      History     Chief Complaint   Patient presents with     Suicidal     Pt is feeling suicidal     HPI  Ana Laura Wharton is a 52 year old female who hx of alcohol use disorder, bipolar 1 disorder who presents to the ED feeling depressed and having suicidal thoughts.  She has hx of 20 plus year sobriety and relapsed 7 years ago.  She has been on and off drinking over the past 7 years.  She relapsed again this past June and has been drinking daily since then.    She has been drinking 1/2 L vodka daily or 1/2 box of wine daily.  Last drink was yesterday.  She is feeling like she is having withdrawals. She also use to be on clonazepam but went through a controlled taper of clonazepam with her pmd and her last dose was 3 days ago. She is feeling uncomfortable and having muscle twitches  She is feeling very anxious which is difficult since she doesn't have clonazepam to help it.  Atarax can help her anxiety.  Gabapentin makes her feel terrible.  She is not sleeping well and hasn't slept in 3 days.  She is feeling very depressed and has plans to end her life by drinking a large amount of alcohol and then overdose on whatever meds she has available. She thought about doing this last night.  Denies hx of withdrawal seizures. She feels that if she were to be in detox she could keep herself safe since she would be receiving treatment.     Past Medical History  Past Medical History:   Diagnosis Date     Abnormal Pap smear of cervix 01/09/2019    see problem list     Anxiety      Bipolar disorder (H)      C. difficile colitis      Cervical high risk HPV (human papillomavirus) test positive 01/09/2019 & 2/19/2020    see problem list     Depressive disorder      Essential hypertension 7/8/2015     Gastro-oesophageal reflux disease      History of colposcopy 03/03/2020    see problem list     Hypothyroidism 4/1/2015     Migraine      Pulmonary emphysema, unspecified  "emphysema type (H) 1/19/2022     Spontaneous pneumothorax     x3, resolved w/ pleurodesis      STD (sexually transmitted disease)      Suicide attempt (H)      Past Surgical History:   Procedure Laterality Date     ARTHROSCOPY KNEE      L knee     BLADDER SURGERY       CERVIX SURGERY      for pre-cancerous changes     COLONOSCOPY Left 3/3/2021    Procedure: COLONOSCOPY, WITH POLYPECTOMY USING COLD SNARE;  Surgeon: Duane Connell MD;  Location: [facility]     left knee surgery       ORTHOPEDIC SURGERY      L shoulder     THORACIC SURGERY      for pneumothorax, pleurodesis and lobe resection     acetaminophen (TYLENOL) 500 MG tablet  albuterol (PROAIR HFA/PROVENTIL HFA/VENTOLIN HFA) 108 (90 Base) MCG/ACT inhaler  atorvastatin (LIPITOR) 10 MG tablet  carvedilol (COREG) 12.5 MG tablet  clonazePAM (KLONOPIN) 0.5 MG tablet  clonazePAM (KLONOPIN) 1 MG tablet  cyclobenzaprine (FLEXERIL) 10 MG tablet  folic acid (FOLVITE) 400 MCG tablet  hydrOXYzine (ATARAX) 50 MG tablet  ketorolac (TORADOL) 30 MG/ML injection  levothyroxine (SYNTHROID/LEVOTHROID) 50 MCG tablet  mirtazapine (REMERON) 30 MG tablet  Multiple Vitamins-Minerals (WOMENS MULTI VITAMIN & MINERAL PO)  nitroFURantoin macrocrystal-monohydrate (MACROBID) 100 MG capsule  norethindrone (MICRONOR) 0.35 MG tablet  OLANZapine (ZYPREXA) 20 MG tablet  OLANZapine (ZYPREXA) 5 MG tablet  ondansetron (ZOFRAN) 4 MG tablet  pantoprazole (PROTONIX) 40 MG EC tablet  polyethylene glycol (MIRALAX) 17 g packet  SUMAtriptan (IMITREX) 25 MG tablet  Emollient (HYDROPHOR) OINT  Sennosides-Docusate Sodium (SENNA S PO)  Syringe/Needle, Disp, (SYRINGE LUER LOCK) 25G X 1\" 3 ML MISC      Allergies   Allergen Reactions     Ciprofloxacin Other (See Comments)     Joint pain cdiff     Compazine [Prochlorperazine] Other (See Comments)     dystonia     Metoclopramide Other (See Comments)     \"I feel like I am crawling out of my skin\"     Reglan [Metoclopramide Hcl] Other (See Comments)     Sensation of " "\"crawling out of skin\"     Restoril [Temazepam]      Thorazine [Chlorpromazine] Other (See Comments)     dystonia     Abilify [Aripiprazole] Rash     Lamotrigine Rash     Severe drug rash - contraindication to receiving again. Skin peeling.      Family History  Family History   Problem Relation Age of Onset     Depression Mother      Pacemaker Mother      Osteoporosis Mother      Lipids Father         hyperlipidemia     Macular Degeneration Father      No Known Problems Brother      No Known Problems Son      Colon Cancer No family hx of      Social History   Social History     Tobacco Use     Smoking status: Every Day     Packs/day: 1.00     Types: Cigarettes     Smokeless tobacco: Never   Vaping Use     Vaping Use: Never used   Substance Use Topics     Alcohol use: Yes     Alcohol/week: 2.0 standard drinks     Types: 2 Glasses of wine per week     Comment: at times 1/2 bottle of vodka     Drug use: Not Currently     Frequency: 0.5 times per week     Comment: occ      Past medical history, past surgical history, medications, allergies, family history, and social history were reviewed with the patient. No additional pertinent items.       Review of Systems   Neurological: Positive for tremors.   Psychiatric/Behavioral: The patient is nervous/anxious.    All other systems reviewed and are negative.    A complete review of systems was performed with pertinent positives and negatives noted in the HPI, and all other systems negative.    Physical Exam   BP: (!) 195/105 (pt weeping when this was taken)  Pulse: 115  Temp: 99.1  F (37.3  C)  Resp: 20  Height: 167.6 cm (5' 6\")  Weight: 58.5 kg (129 lb)  SpO2: 97 %  Physical Exam  Vitals and nursing note reviewed.   Constitutional:       General: She is not in acute distress.     Appearance: She is not ill-appearing, toxic-appearing or diaphoretic.   HENT:      Head: Normocephalic and atraumatic.      Nose: No congestion or rhinorrhea.      Mouth/Throat:      Mouth: Mucous " membranes are moist.   Eyes:      General: No scleral icterus.     Extraocular Movements: Extraocular movements intact.   Cardiovascular:      Rate and Rhythm: Tachycardia present.   Pulmonary:      Effort: Pulmonary effort is normal.   Musculoskeletal:      Cervical back: Normal range of motion.      Comments: Fine tremor   Skin:     Coloration: Skin is not jaundiced or pale.   Neurological:      General: No focal deficit present.      Mental Status: She is alert and oriented to person, place, and time.   Psychiatric:         Attention and Perception: Attention and perception normal.         Mood and Affect: Mood is anxious and depressed.         Speech: Speech normal.         Behavior: Behavior normal. Behavior is cooperative.         Thought Content: Thought content includes suicidal ideation. Thought content includes suicidal plan.         Cognition and Memory: Cognition and memory normal.         Judgment: Judgment normal.         ED Course      Procedures       The medical record was reviewed and interpreted.  Current labs reviewed and interpreted.  Mental Health Risk Assessment      PSS-3    Date and Time Over the past 2 weeks have you felt down, depressed, or hopeless? Over the past 2 weeks have you had thoughts of killing yourself? Have you ever attempted to kill yourself? When did this last happen? User   10/24/22 1141 yes yes yes more than 6 months ago [facility]      C-SSRS ([place])    Date and Time Q1 Wished to be Dead (Past Month) Q2 Suicidal Thoughts (Past Month) Q3 Suicidal Thought Method Q4 Suicidal Intent without Specific Plan Q5 Suicide Intent with Specific Plan Q6 Suicide Behavior (Lifetime) Within the Past 3 Months? RETIRED: Level of Risk per Screen Screening Not Complete User   10/24/22 1141 yes yes yes no yes yes -- -- -- [facility]                Suicide assessment completed by mental health (D.E.C., LCSW, etc.)       Results for orders placed or performed during the hospital encounter of 10/24/22    Asymptomatic COVID-19 Virus (Coronavirus) by PCR Nasopharyngeal     Status: Normal    Specimen: Nasopharyngeal; Swab   Result Value Ref Range    SARS CoV2 PCR Negative Negative    Narrative    Testing was performed using the Xpert Xpress SARS-CoV-2 Assay on the   Cepheid Gene-Xpert Instrument Systems. Additional information about   this Emergency Use Authorization (EUA) assay can be found via the Lab   Guide. This test should be ordered for the detection of SARS-CoV-2 in   individuals who meet SARS-CoV-2 clinical and/or epidemiological   criteria. Test performance is unknown in asymptomatic patients. This   test is for in vitro diagnostic use under the FDA EUA for   laboratories certified under CLIA to perform high complexity testing.   This test has not been FDA cleared or approved. A negative result   does not rule out the presence of PCR inhibitors in the specimen or   target RNA in concentration below the limit of detection for the   assay. The possibility of a false negative should be considered if   the patient's recent exposure or clinical presentation suggests   COVID-19. This test was validated by the [facility]. This laboratory is certified under the Clinical Laboratory Improvement Amendments of 1988 (CLIA-88) as qualified to perform high complexity laboratory testing.     HCG qualitative urine     Status: Normal   Result Value Ref Range    hCG Urine Qualitative Negative Negative   Drug abuse screen 1 urine (ED)     Status: Normal   Result Value Ref Range    Amphetamines Urine Screen Negative Screen Negative    Barbiturates Urine Screen Negative Screen Negative    Benzodiazepines Urine Screen Negative Screen Negative    Cannabinoids Urine Screen Negative Screen Negative    Cocaine Urine Screen Negative Screen Negative    Opiates Urine Screen Negative Screen Negative   Comprehensive metabolic panel     Status: Abnormal   Result Value Ref Range    Sodium 139 133 - 144  mmol/L    Potassium 3.9 3.4 - 5.3 mmol/L    Chloride 106 94 - 109 mmol/L    Carbon Dioxide (CO2) 24 20 - 32 mmol/L    Anion Gap 9 3 - 14 mmol/L    Urea Nitrogen 9 7 - 30 mg/dL    Creatinine 0.56 0.52 - 1.04 mg/dL    Calcium 9.1 8.5 - 10.1 mg/dL    Glucose 93 70 - 99 mg/dL    Alkaline Phosphatase 188 (H) 40 - 150 U/L    AST 81 (H) 0 - 45 U/L    ALT 46 0 - 50 U/L    Protein Total 8.1 6.8 - 8.8 g/dL    Albumin 4.4 3.4 - 5.0 g/dL    Bilirubin Total 0.9 0.2 - 1.3 mg/dL    GFR Estimate >90 >60 mL/min/1.73m2   INR     Status: Normal   Result Value Ref Range    INR 1.14 0.85 - 1.15   CBC with platelets and differential     Status: Abnormal   Result Value Ref Range    WBC Count 11.9 (H) 4.0 - 11.0 10e3/uL    RBC Count 4.18 3.80 - 5.20 10e6/uL    Hemoglobin 13.5 11.7 - 15.7 g/dL    Hematocrit 38.7 35.0 - 47.0 %    MCV 93 78 - 100 fL    MCH 32.3 26.5 - 33.0 pg    MCHC 34.9 31.5 - 36.5 g/dL    RDW 15.5 (H) 10.0 - 15.0 %    Platelet Count 191 150 - 450 10e3/uL    % Neutrophils 58 %    % Lymphocytes 28 %    % Monocytes 11 %    % Eosinophils 1 %    % Basophils 1 %    % Immature Granulocytes 1 %    NRBCs per 100 WBC 0 <1 /100    Absolute Neutrophils 7.0 1.6 - 8.3 10e3/uL    Absolute Lymphocytes 3.3 0.8 - 5.3 10e3/uL    Absolute Monocytes 1.3 0.0 - 1.3 10e3/uL    Absolute Eosinophils 0.2 0.0 - 0.7 10e3/uL    Absolute Basophils 0.1 0.0 - 0.2 10e3/uL    Absolute Immature Granulocytes 0.1 <=0.4 10e3/uL    Absolute NRBCs 0.0 10e3/uL   [place] Draw     Status: None    Narrative    The following orders were created for panel order [place] Draw.  Procedure                               Abnormality         Status                     ---------                               -----------         ------                     Extra Red Top Tube[[id number]]                               Final result                 Please view results for these tests on the individual orders.   Extra Red Top Tube     Status: None   Result Value Ref Range    Hold  Specimen [facility]    UA with Microscopic reflex to Culture     Status: Abnormal    Specimen: Urine, Clean Catch   Result Value Ref Range    Color Urine Straw Colorless, Straw, Light Yellow, Yellow    Appearance Urine Clear Clear    Glucose Urine Negative Negative mg/dL    Bilirubin Urine Negative Negative    Ketones Urine Negative Negative mg/dL    Specific Gravity Urine 1.005 1.003 - 1.035    Blood Urine Negative Negative    pH Urine 6.5 5.0 - 7.0    Protein Albumin Urine Negative Negative mg/dL    Urobilinogen Urine Normal Normal, 2.0 mg/dL    Nitrite Urine Negative Negative    Leukocyte Esterase Urine Negative Negative    Bacteria Urine Few (A) None Seen /HPF    RBC Urine <1 <=2 /HPF    WBC Urine 1 <=5 /HPF    Squamous Epithelials Urine 1 <=1 /HPF    Narrative    Urine Culture not indicated   Alcohol breath test POCT     Status: Normal   Result Value Ref Range    Alcohol Breath Test 0.00 0.00 - 0.01   Urine Drugs of Abuse Screen     Status: Normal    Narrative    The following orders were created for panel order Urine Drugs of Abuse Screen.  Procedure                               Abnormality         Status                     ---------                               -----------         ------                     Drug abuse screen 1 urin...[[id number]]  Normal              Final result                 Please view results for these tests on the individual orders.   CBC with platelets differential     Status: Abnormal    Narrative    The following orders were created for panel order CBC with platelets differential.  Procedure                               Abnormality         Status                     ---------                               -----------         ------                     CBC with platelets and d...[[id number]]  Abnormal            Final result                 Please view results for these tests on the individual orders.     Medications   diazepam (VALIUM) tablet 5-20 mg (10 mg Oral Given 10/24/22 6108)    atenolol (TENORMIN) tablet 50 mg (50 mg Oral Given 10/24/22 1500)   thiamine (B-1) tablet 100 mg (100 mg Oral Given 10/24/22 1500)   folic acid (FOLVITE) tablet 1 mg (1 mg Oral Given 10/24/22 1500)   multivitamin w/minerals (THERA-VIT-M) tablet 1 tablet (1 tablet Oral Given 10/24/22 1500)   hydrOXYzine (ATARAX) tablet 50 mg (has no administration in time range)        Assessments & Plan (with Medical Decision Making)   Ana Laura Wharton is a 52 year old female who hx of alcohol use disorder, bipolar 1 disorder who presents to the ED feeling depressed and having suicidal thoughts as well as withdrawing from alcohol.  She has a plan and intent but says she can be safe if she is here for treatment and help.  She denies withdrawal seizures.  She also just finished a benzo taper 3 days ago.   I discussed with Dr. Bach who recommends continuing with [facility] protocol for alcohol withdrawal using valium. This was ordered.  Routine meds also ordered for admission purposes.  [facility]  This is a voluntary admit. Atarax for anxiety also given.     I have reviewed the nursing notes. I have reviewed the findings, diagnosis, plan and need for follow up with the patient.    New Prescriptions    No medications on file       Final diagnoses:   Alcohol dependence with uncomplicated withdrawal (H)   Anxiety   Severe episode of recurrent major depressive disorder, without psychotic features (H)       --  Zuleyka May MD  [facility] EMERGENCY DEPARTMENT  10/24/2022     Zuleyka May MD  10/24/22 1430       Zuleyka May MD  10/24/22 1701       Zuleyka May MD  10/24/22 1702

## 2022-10-25 NOTE — PLAN OF CARE
"MELANIE      Ana Laura Wharton is a 52 year old year old female with a chief complaint of Suicidal (Pt is feeling suicidal)        S = Situation:    52 yrs old female seeking detox from alcohol.      B  = Background:   Per ED report, pt came in to seek detox from alcohol.  Pt  has twenty years of sobriety, but started drinking on and off since last year.  She stated that she has been drinking half a box of wine or alcohol daily since June.  Her last drink was yesterday.  That she came in very anxious and shaky.  \"Pt reports that her Bipolar is not being managed at all.  Pt's PCP tapered her off of clonazepam.  She has nothing to control her anxiety and has not slept in the last 3 days.  Pt was making SI plans to drink a large amount of alcohol and overdose on meds yesterday\". Pt stated that she had overdose on pills and has had three to four significant overdose which resulted in comma. This started in 2015 and the last episode was three years ago.      Vital Signs: BP (!) 138/91   Pulse 82   Temp 97.2  F (36.2  C) (Oral)   Resp 16   Ht 1.676 m (5' 6\")   Wt 58.5 kg (129 lb)   LMP 01/13/2018 (Exact Date)   SpO2 97%   BMI 20.82 kg/m        A  =  Assessment:      Pt rated anxiety at 7/10, but denies pain, depression, SI/SIB/HI/AVH, and contracts for safety. MSSA score = 5.  R =   Request or Recommendation:      Continue to evaluate and treat                       "

## 2022-10-25 NOTE — PROGRESS NOTES
Met with Pt for discharge planning.  Pt reports a plan to complete detox and return home.  Pt states she attends 2 [facility] Depression/Bipolar/Subtance Abuse support meeting weekly, meets with her  Weekly, has a peer  through [facility], and sees her psychiatrist every other week or monthly.  Pt declines assessment and referral to treatment at this time. Pt has a Medicare [employer] Policy that does not cover treatment.  Pt reports she quit her job in June and this led to relapse. Pt open to investigating getting a Peer  through [facility] as well.  Pt denies any current suicidal ideation, plan, or intention.  She reports that when she starts drinking she loses all benefit from medications and this leads to SI.  She states when taking her medications and sober her life is manageable.  Advised Pt to seek assistance if needs arise.  Pt acknowledged.  AVS complete.

## 2022-10-25 NOTE — PLAN OF CARE
"  Problem: Alcohol Withdrawal  Goal: Alcohol Withdrawal Symptom Control  Outcome: Progressing   Goal Outcome Evaluation:    Plan of Care Reviewed With: patient        Pt alert, tearful and anxious this morning, states she hasn't slept for 3 days and she is worried she doesn't belong on the unit, stated \" I don't want to be here\", pt had >50% breakfast, had medication, rated anxiety and depression 10, Hydroxyzine 25mg as needed with effect, MSSA score 9/5 Valium 10mg this shift, pt endorsing feeling overwhelmed with life, VS T97.1 P 99 RR18 Bp 167/89. BP rechecked after anti-anxiety med administration Bp 128/86. Pt calmly sitting in the room , PTA meds restarted, denies urges of harming self or others, pt does not have thoughts/intent  or plan  to harm self, Pt contracts for safety, attended morning group, DBT suggested to pt, family  Nessa to visit on Oct 30th at 2.30pm.         "

## 2022-10-25 NOTE — PLAN OF CARE
Behavioral Team Discussion: (10/25/2022)    Continued Stay Criteria/Rationale: Patient admitted for alcohol withdrawal and Use Disorder and Suicidal Ideations.  Plan: The following services will be provided to the patient; psychiatric assessment, medication management, therapeutic milieu, individual and group support, and skills groups.   Participants: 3A Provider: Dr. Rosa Maria Bach MD; 3A RN: Liz Lozano RN; 3A CM's: Dottie Reyes .  Summary/Recommendation: Providers will assess today for treatment recommendations, discharge planning, and aftercare plans. CM will meet with pt for discharge planning.   Medical/Physical: Pt self-caths  Precautions:   Behavioral Orders   Procedures     Code 1 - Restrict to Unit     Discontinue 1:1 attendant for suicide risk     Order Specific Question:   I have performed an in person assessment of the patient     Answer:   Based on this assessment the patient no longer requires a one on one attendant at this point in time.     Routine Programming     As clinically indicated     Status 15     Every 15 minutes.     Suicide precautions     Patients on Suicide Precautions should have a Combination Diet ordered that includes a Diet selection(s) AND a Behavioral Tray selection for Safe Tray - with utensils, or Safe Tray - NO utensils       Withdrawal precautions     Rationale for change in precautions or plan: N/A  Progress: Initial.    ASAM Dimension Scale Ratings:  Dimension 1: 4 Client is incapacitated with severe signs and symptoms. Client displays severe withdrawal and is a danger to self or others.  Dimension 2: 1 Client tolerates and becki with physical discomfort and is able to get the services that the client needs.  Dimension 3: 2 Client has difficulty with impulse control and lacks coping skills. Client has thoughts of suicide or harm to others without means; however, the thoughts may interfere with participation in some treatment activities. Client has difficulty  functioning in significant life areas. Client has moderate symptoms of emotional, behavioral, or cognitive problems. Client is able to participate in most treatment activities.  Dimension 4: 2 Client displays verbal compliance, but lacks consistent behaviors; has low motivation for change; and is passively involved in treatment.  Dimension 5: 3 Client has poor recognition and understanding of relapse and recidivism issues and displays moderately high vulnerability for further substance use or mental health problems. Client has few coping skills and rarely applies coping skills.  Dimension 6: 4 Client has (A) Chronically antagonistic significant other, living environment, family, peer group or long-term criminal justice involvement that is harmful to recovery or treatment progress, or (B) Client has an actively antagonistic significant other, family, work, or living environment with immediate threat to the client's safety and well-being.

## 2022-10-25 NOTE — PROGRESS NOTES
Met with Pt and introduced DBT program.  Pt reports she was in DBT about 15 years ago and that it was individual.  Writer explained to Pt this would likely be a different experience.  Pt agreed.  Intake is scheduled with [facility] on Monday November 7th @ 1:00 PM.  Pt will be sent a confirmation text when insurance is verified.  This is a virtual appointment and an e-mail will be sent with access to the appointment.   AVS updated.

## 2022-10-25 NOTE — PROGRESS NOTES
10/24/22 2131   Patient Belongings   Did you bring any home meds/supplements to the hospital?  No   Patient Belongings other (see comments)   Belongings Search Yes   Clothing Search Yes   Second Staff Jaz Pulido     Storage bin: duffle bag, bag of toiletries, jacket, box of catheters    Box in med room: no phone, no wallet, nothing to security    Pt has her own catheters and wipes    ADMISSION:  I am responsible for any personal items that are not sent to the safe or pharmacy. [place] is not responsible for loss, theft or damage of any property in my possession.    Patient Signature _______________________________________ Date/Time _____________________    Staff Signature ________________________________________ Date/Time _____________________    2nd Staff person, if patient is unable/unwilling to sign    _____________________________________________________ Date/Time _____________________    DISCHARGE:    All personal items have been returned to me.    Patient Signature ____________________________________________ Date/Time _____________________    Staff Signature ____________________________________________ Date/Time _____________________

## 2022-10-25 NOTE — PLAN OF CARE
"Midnight MSSA score 6. Pt woke frequently during the night asking for medications for anxiety. She received PRN Trazodone for sleep at 0000, PRN hydroxyzine at 0338. Pt came to nursing station again one hour later requesting medication for anxiety. When informed that she had hydroxyzine one hour prior, she requested valium. Pt was rubbing her arms and legs with her hand and reported feeling very anxious and \"thinking bad thoughts,\" but did not elaborate on those thoughts. RN took pt vitals and completed MSSA assessment for a score of 9. PRN valium 10mg given. VSS with slight HTN. Pt then went back to room. Pt denies pain and contracts for safety.     /85 (BP Location: Right arm, Patient Position: Sitting)   Pulse 64   Temp 97.6  F (36.4  C) (Temporal)   Resp 18   Ht 1.676 m (5' 6\")   Wt 58.5 kg (129 lb)   LMP 01/13/2018 (Exact Date)   SpO2 97%   BMI 20.82 kg/m      "

## 2022-10-25 NOTE — DISCHARGE INSTRUCTIONS
Behavioral Discharge Planning and Instructions  THANK YOU FOR CHOOSING THE [facility]  3A  [phone]    Summary: You were admitted to Station 3A on 10/24/22 for detoxification from alcohol.  A medical exam was performed that included lab work. You have met with a  and opted to return home, attend DIEGO meetings and work with your Psychiatrist, , and peer . You have agreed to complete an intake with [facility] for DBT.  Please take care and make your recovery a priority!    Your intake with [facility] is scheduled for Monday November 7th @ 1:00 PM.  You will be sent a confirmation text when insurance is verified.  This is a virtual appointment and an e-mail will be sent with access to the appointment.    Please consider contacting [place] [facility] for further Peer Coaching.  [facility] ([facility])  [facility] connects people seeking recovery to resources that help foster and sustain long-term recovery.  Whether you are seeking resources for treatment, transportation, housing, job training, education, health care or other pathways to recovery, [facility] is a great place to start.    Phone: [phone].  www.[place] [place].Apps4Pro (Great listing of all types of recovery and non-recovery related resources)    Main Diagnosis: Per Dr. Rosa Maria Bach MD;    Bipolar affective disorder most recent episode mixed severe recurrent without psychosis  Suicidal ideation with a plan  PTSD  Generalized anxiety disorder  Alcohol use disorder severe  Alcohol withdrawal severe  Nicotine dependence        Recommendation:  If you need more support to maintain sobriety please contact Behavioral Access @ [phone] to schedule a substance use disorder assessment and follow the recommendations of that assessment.      Disposition: Home      Primary Provider:      Psychiatry Follow-up:        Major Treatments, Procedures and Findings:  You have withdrawn from alcohol using the MSSA protocol with  Valium.  You have met with a  to develop a treatment plan for discharge.  You have had labs drawn and those results have been reviewed with you. Please take a copy of your lab work with you to your next primary care physician appointment.    Symptoms to Report:  If you experience more anxiety, confusion, sleeplessness, deep sadness or thoughts of suicide, notify your treatment team or notify your primary care physician. IF ANY OF THE SYMPTOMS YOU ARE EXPERIENCING ARE A MEDICAL EMERGENCY CALL 911 IMMEDIATELY.     If you or someone you know is struggling or in crisis, help is available.  Call or text 988 or chat  at [email or web address]    Lifestyle Adjustment: Adjust your lifestyle to get enough sleep, relaxation, exercise and  good nutrition. Continue to develop healthy coping skills to decrease stress and promote a sober living environment. Do not use alcohol, illegal drugs or addictive medications other than what is currently prescribed. AA, ATILIO, and  Sponsor are excellent resources for support.     General Medication Instructions:   See your medication sheet(s) for instructions.   Take all medicines as directed.  Make no changes unless your doctor suggests them.       DISCHARGE RESOURCES:  Facts about COVID19 at www.cdc.gov/COVID19 and www.[place].gov/covid19    Keeping hands clean is one of the most important steps we can take to avoid getting sick and spreading germs to others.  Please wash your hands frequently and lather with soap for at least 20 seconds!    Recovery apps for your phone to locate current in person and zoom recovery meetings  [place] - meeting tobias  AA  - meeting tobias  Meeting guide - meeting tobias  Quick NA meeting - meeting tobias  Deni- has various apps    Great Pod casts for nutrition and wellness  Listen on Apple Podcasts  Dishing Up Nutrition   [employer]   Nutrition       Understand the connection between what you eat and how you feel. Hosted by  "licensed nutritionists and dietitians from [employer] we share practical, real-life solutions for healthier living through nutrition.     Resources:   Resources for on line recovery meetings:  *due to covid-19 AA/NA meetings are being held online*  AA meetings can be found online; search for them at: http://aa-intergroup.org/directory.php  AA meetings via [place] for [place] area can be found online at: https://[place].org/find-a-meeting/holiday-closings/  NA meetings via ZOOM for [place] area can be found online at: https://sites.[employer].com/view/[place]regionofnarcoticsanonymous/home?authuser=2  Www.[employer]  has online resources for meeting and recovery care including Podcast \"Let's Talk:Addiction & Recovery Podcasts  Www.[employer].org   -SMART Recovery - self management for addiction recovery:  www.smartrecMarketToolsy.org    -Pathways ~ A Health Crisis Resource & Support Center: 879.516.3573.  -[facility] [phone]   -[facility] Behavioral Intake 488-212-2323 or 530-646-8188.  -Suicide Awareness Voices of Education (SAVE) (www.save.org): 322-047-UOOT (9200)  -National Suicide Prevention Line (www.mentalhealthmn.org): 417-661-DITO (2967)  -National [place] on Mental Illness (www.[place].duane.org): 554.473.3775 or 844-920-1083.  -Uuie0pqii: text the word LIFE to 66728 for immediate support and crisis intervention  -Mental Health Consumer/Survivor Network of [place] (www.mhcsn.net): 359.288.3617 or 919-950-9167  -Mental Health Association of [place] (www.mentalhealth.org): 852.590.5468 or 852-384-2219     -Substance Abuse and Mental Health Services (www.samhsa.gov)  -Harm Reduction Coalition (www. Harmreduction.org)  -www.prescribetoprevent.org or http://prescribetoprevent.org/video  -Poison control 8-054-437-5556   **[place] Opioid Prevention Coalition: www.opioidcoalition.org    Sober Support Group Information:  AA/ATILIO & Sponsor/Support  -Alcoholics Anonymous " (www.alcoholics-anonymous.org): for local information 24 hours/day  -AA Intergroup service office in [place] (http://www.aastpaul.org/) 133.867.2371  -AA Intergroup service office in [facility]: 234.866.8491. (http://www.a[place].org/)  -Narcotics Anonymous (www.nam[place].org) (941) 932-4538   **Sober Fun Activities: www.soberVyome Biosciencesactivities.MyMoneyPlatform/cities//[place]      Any follow up concerns:  Nursing questions call the [facility] [phone]  Medical Record call [phone]  Outpatient Behavioral Intake call [phone]  LP+ Wait List/Bed Availability call [phone]    The entire treatment team has appreciated the opportunity to work with you.  We wish you the best in the future and with your lifelong recovery goals. Please bring this discharge folder with you to all follow up appointments.  It contains your lab results, diagnosis, medication list and discharge recommendations.    THANK YOU FOR CHOOSING THE [facility]

## 2022-10-25 NOTE — H&P
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 and place  Attention Span and Concentration:  intact  Recent and Remote Memory:  intact  Language:  english with appropriate syntax and vocabulary  Fund of Knowledge: appropriate  Muscle Strength and Tone: normal  Gait and Station: Normal     There are no abnormal or psychotic thoughts, no preoccupations, no overvalued ideas, no rumination, no obsessions, no compulsions, no somatic concerns, no hypochrondriasis, no ideas of reference, and no delusions.  Patient denies homicidal thoughts.   Patient denies suicidal thoughts.  Patient appears to have good judgment and good insight.     Musculoskeletal: Patient shows no abnormalities of motor activity: there is no tremor, no tic, and no dystonia.  There is no apparent muscle atrophy, strength and tone appear normal, and there are no abnormal movements.  Patient has normal gait and stance.    DISCUSSION:         Assessment:       Patient has a biological predisposition with family history positive for depression alcohol use disorder  Psychologically patient is experiencing neurovegetative symptoms of depression suicidal ideation with a plan alcohol dependence PTSD generalized anxiety disorder  Patient has these particular stressors estrangement with her son strained relationship with her boyfriend financial stressors  Patient has chronic illness exacerbation  leading to hospitalization progression as described.     Patient has been unable to stop using drugs in the community due to both physical and psychological symptoms.  Continued use will put the patient at risk for medical and/or psychiatric complications.      Inpatient psychiatric hospitalization is warranted at this time for safety, stabilization, and possible adjustment in medications.       Diagnoses:   Bipolar affective disorder most recent episode mixed severe recurrent without psychosis  Suicidal ideation with a plan  PTSD  Generalized anxiety disorder  Alcohol use disorder severe  Alcohol withdrawal severe  Nicotine dependence            Plan:   Problem list  1#bipolar disorder most recent episode depressed recurrent severe  without psychosis   We will continue patient's medications Remeron 60 mg Zyprexa 25 mg in divided doses       Alcohol use disorder severe alcohol withdrawal severe  Given patient has history of cirrhosis patient will be switched to Ativan  - MSSA protocol using Ativan for management of alcohol withdrawal    - Continue thiamine, folate, and multivitamin daily    MSSA    Eating Disturbances: 3  Tremor: 1 - not visibly apparent but can be felt by the examiner placing his fingertip slightly against the patient's fingertips  Sleep Disturbance: slept through the night or not applicable  Clouding of Sensorium: no evidence  Hallucinations: 0 - none  Quality of Contact: 0 - awareness of examiner and people around him/her  Agitation: 1 - somewhat more than normal activity  Paroxysmal Sweats: 0 - no observed sweating  Temperature: 99.5 or below  Pulse: 3 - 90 to 99  Total MSSA Score: 9  Patient received 45 mg of diazepam since admission      2#patient has leukocytosis WBC is elevated 13.5 we will put internal medicine consult    3#patient's AST is elevated at 81 alkaline phosphatase elevated at 188 most likely from alcoholism nonviral supplies suspected patient has history of cirrhosis we will put  internal medicine consult  4#patient has elevated GGT 1817        - Consider anti-craving medications prior to discharge. Pt willing to review additional information about both naltrexone and Antabuse.  -Alcohol withdrawal nausea prn Zofran as needed for nausea     hydroxyzine 25 mg q4h prn for acute anxiety  Trazodone 50 mg at bedtime prn for sleep disturbances       Patient has been unable to stop using drugs in the community due to both physical and psychological symptoms.  Continued use will put the patient at risk for medical and/or psychiatric complications.    I HAVE REVIEWED LABS WITH PT AND TALKED ABOUT RESULTS WITH PT  I HAVE REVIEWED AND SUMMARIZED OLD RECORDS including his medication reconcilation of his home medications  and PDMP   I HAVE SPOKEN WITH RN ABOUT MEDICATIONS AND DETOX SCORES  I HAVE SPOKEN WITH CM ABOUT PTS TREATMENT OPTIONS     Discussed in detail about patient's smoking patient was advised to quit patient was told about the impact of smoking.  Patient's willingness to quit was assessed.  I provided methods and skills for cessation including medication management nicotine gum patch.  Patient did not set a quit date.  Patient is interested in quitting .we discussed pharmacotherapy options .patient agreed to take nicotine gum patch lozenge.  We discussed behavioral change techniques when craving nicotine including deep breathing drinking glass of water, taking a walk.            Laboratory/Imaging:    Liver Function Studies -   Recent Labs   Lab Test 10/24/22  1440   PROTTOTAL 8.1   ALBUMIN 4.4   BILITOTAL 0.9   ALKPHOS 188*   AST 81*   ALT 46      Last Comprehensive Metabolic Panel:  Sodium   Date Value Ref Range Status   10/24/2022 139 133 - 144 mmol/L Final   01/18/2021 138 133 - 144 mmol/L Final     Potassium   Date Value Ref Range Status   10/24/2022 3.9 3.4 - 5.3 mmol/L Final   01/18/2021 4.0 3.4 - 5.3 mmol/L Final     Chloride   Date Value Ref Range Status   10/24/2022 106 94 - 109  mmol/L Final   01/18/2021 107 94 - 109 mmol/L Final     Carbon Dioxide   Date Value Ref Range Status   01/18/2021 22 20 - 32 mmol/L Final     Carbon Dioxide (CO2)   Date Value Ref Range Status   10/24/2022 24 20 - 32 mmol/L Final     Anion Gap   Date Value Ref Range Status   10/24/2022 9 3 - 14 mmol/L Final   01/18/2021 9 3 - 14 mmol/L Final     Glucose   Date Value Ref Range Status   10/24/2022 93 70 - 99 mg/dL Final   01/18/2021 85 70 - 99 mg/dL Final     Comment:     Fasting specimen     Urea Nitrogen   Date Value Ref Range Status   10/24/2022 9 7 - 30 mg/dL Final   01/18/2021 11 7 - 30 mg/dL Final     Creatinine   Date Value Ref Range Status   10/24/2022 0.56 0.52 - 1.04 mg/dL Final   01/18/2021 0.84 0.52 - 1.04 mg/dL Final     GFR Estimate   Date Value Ref Range Status   10/24/2022 >90 >60 mL/min/1.73m2 Final     Comment:     Effective December 21, 2021 eGFRcr in adults is calculated using the 2021 CKD-EPI creatinine equation which includes age and gender (Star et al., NEJM, DOI: 10.1056/YRAHfy2422676)   01/18/2021 80 >60 mL/min/[1.73_m2] Final     Comment:     Non  GFR Calc  Starting 12/18/2018, serum creatinine based estimated GFR (eGFR) will be   calculated using the Chronic Kidney Disease Epidemiology Collaboration   (CKD-EPI) equation.       Calcium   Date Value Ref Range Status   10/24/2022 9.1 8.5 - 10.1 mg/dL Final   01/18/2021 9.4 8.5 - 10.1 mg/dL Final     Bilirubin Total   Date Value Ref Range Status   10/24/2022 0.9 0.2 - 1.3 mg/dL Final   04/19/2021 0.4 0.2 - 1.3 mg/dL Final     Alkaline Phosphatase   Date Value Ref Range Status   10/24/2022 188 (H) 40 - 150 U/L Final   04/19/2021 164 (H) 40 - 150 U/L Final     ALT   Date Value Ref Range Status   10/24/2022 46 0 - 50 U/L Final   04/19/2021 47 0 - 50 U/L Final     AST   Date Value Ref Range Status   10/24/2022 81 (H) 0 - 45 U/L Final   04/19/2021 68 (H) 0 - 45 U/L Final                   Medical treatment/interventions:  Medical  "concerns: As above    - Consults: IM consult placed. Appreciate assistance.     Legal Status: Voluntary     Safety Assessment:   Checks: Status 15  Pt has not required locked seclusion or restraints in the past 24 hours to maintain safety, please refer to RN documentation for further details.    The risks, benefits, alternatives and side effects have been discussed and are understood by the patient.       Patient will be treated in therapeutic milieu with appropriate individual and group therapies as described.  Disposition: Pending clinical stabilization. Pt does  appear interested in COMPLETE DETOX AND DO TRT  Length of stay 3-5 days        \"Much or all of the text in this note was generated through the use of Dragon Dictate voice to text software. Errors in spelling or words which appear to be out of contact are unintentional, may be present due having escaped editing\"     "

## 2022-10-26 NOTE — PLAN OF CARE
Problem: Substance Withdrawal  Goal: Social and Therapeutic (Substance Withdrawal)  Description: Signs and symptoms of listed problems will be absent or manageable    Outcome: Adequate for Care Transition     Patient discharged to home  Reports being picked up by Nessa her   Patient escorted off the unit by psych associate Goyo       All patient belongings from room, locked bin and security were sent with patient. This RN went over discharge instructions, teachings, patient's labs, and discharge  recommendations with patient. This RN went over patient's prescribed medications being sent home with patient from pharmacy. Patient verbalizes and demonstrates understanding of all teachings. Patient denies thoughts of harm towards self or others.  Pt denies any current medical issues at this time. Patient denies any further questions and is now discharged at this time

## 2022-10-26 NOTE — PROGRESS NOTES
Medicine Sign-Off Note    Chart reviewed.     Met with patient briefly in [place]. Reports ongoing pelvic discomfort and now having dysuria and urinary urgency. Remains afebrile. Per discussion with primary team, patient is scheduled to discharge today.    Initial UA on 10/24 negative. Repeat UA on 10/25 with +LE and 175 wbc/hpf on urine micro. Nitrites negative. UC pending, but currently reported as no growth.     Afebrile. WBC 12.3 today, overall stable.     At risk for CAUTI with intermittent self catheterization. Given current symptoms, UA results, and leukocytosis would err on side of treating for acute cystitis at this time.    Patient does take Macrobid 100mg daily for prophylaxis. Previous culture results include Enterobacter cloacae on 9/16/22 (R-amp, amp/sulbactam, cefoxitin; I-macrobid) and E.coli on 10/6/21 (pansensitive).     Patient has allergy to Cipro.    Recommendations:  - Bactrim DS BID x 5 days on discharge (ordered)  - OK to continue prophylactic macrobid on discharge   - Should follow up with PCP within a few days to review final culture results  - Encourage clean technique with self catheterization

## 2022-10-26 NOTE — DISCHARGE SUMMARY
Ana Laura Wharton MRN# [id number]   Age: 52 year old YOB: 1970     Date of Admission:  10/24/2022  Date of Discharge:  10/26/2022  Admitting Physician:  Rosa Maria Bach MD  Discharge Physician:  Rosa Maria Bach MD      DISCHARGE  DX  Bipolar affective disorder most recent episode mixed severe recurrent without psychosis    PTSD  Generalized anxiety disorder  Alcohol use disorder severe    Nicotine dependence           Event Leading to Hospitalization:     See Admission note by admitting provider for patient encounter. for additional details.          Hospital Course:   PATIENT was admitted to [facility]with attending  under DR bach, please review the detailed admit note on day 10/ 25/2022   The patient was placed under status 15 (15 minute checks) to ensure patient safety.   MSSA protocol was initiated due to the patient's history of alcohol abuse and concern for withdrawal symptoms.  CBC, BMP and utox obtained.    All outpatient medications were continued  Patient was restarted back on Remeron 60 mg and Zyprexa 30 mg in divided doses  Patient was detoxed off MSSA protocol on Ativan because patient has history of cirrhosis  She completed her withdrawal  Her energy motivation sleep interest improved  She did not want to kill herself she wants to live she wants to get better.  She reports that she has her son    After patient signed consent I spoke with patient's significant other and told the patient is being discharged.  He did not have any concerns about her discharged he is willing to come and pick her up.  She has an appointment tomorrow with her psychiatrist at 3:40 virtually.  I spoke with her nurse and gave the information to the treatment team.  On the unit patient has been visible    She has consistently denied any suicidal ideation plan or intent she has been eating cooperative.  She denied any suicide ideation plan or intent to the nurses to the .  She wants to  live she wants to get better      PATIENTdid participate in groups and was visible in the milieu.     The patient's symptoms of alcohol withdrawal improved.     Patients energy motivation , sleep appetite improved.  Pt completed detox . It was un eventful.      Discussed with patient medications for craving.  Spoke with patient about triggers coping skills relapse prevention.    CONSULTS DONE DURING PATIENTS HOSPITALIZATION.  Patient was seen by medicine on date10/25/22    This as per their medical consult  Assessment & Plan     Ana Laura Wharton is a 52 year old female with history of alcohol use disorder, MDD, anxiety, bipolar disorder, HTN, GERD, emphysema, recurrent spontaneous pneumothoraces s/p pleurodesis, chronic liver disease, hypothyroidism, migraines, and urinary retention who was admitted to unit 3A on 10/24/2022 for alcohol detox.      1. Alcohol use disorder:  Longstanding history of alcohol use with intermittent periods of sobriety. Relapsed in June 2022, drinking intermittently since then. Admits to drinking 500 mL of hard alcohol daily x 2 weeks in setting of klonopin taper and increased anxiety. Currently having mild withdrawal symptoms. No history of withdrawal seizures or DTs.   - [facility] protocol  - Thiamine and folate while admitted  - Further management per primary team     2. Benzodiazepine dependence:  Completed a klonopin taper prior to admission.   - Management per primary team     3. Transaminitis:  AST 81, ALT 46 with ratio 2:1 c/w alcohol use. Bilirubin normal. Alk Phos 188 with significantly elevated GGT again c/w alcohol use. Anticipate improvement with abstinence.   - Repeat LFTs on 10/26     4. Leukocytosis:  WBC 11.9 on arrival. Afebrile. Reports some pelvic pain c/w prior UTI. Patient has to straight cath 4x daily due to retention, therefore at increased risk. UA negative in ED. Repeat WBC 13.5 today. ? Infection vs stress demargination.   - Repeat UA  - Repeat CBC in AM  "     5. Constipation:  Abdominal exam benign. No nausea or vomiting.   - Miralax daily  - Senokot 2 tabs BID     6. Urinary retention:  Unclear etiology. Patient reports needing to straight cath 4x daily.  - Straight cath per home routine  - Follow up with urology as outpatient      7. Chronic liver disease:  Patient reports that she was diagnosed with stage IV cirrhosis by hepatology at [facility]. Patient does not appear to have any complications of this. Her abdomen is soft and w/o evidence of ascites. Her bilirubin is normal. No jaundice noted. No history of variceal bleed. Recent US 8/29/22 showed diffuse coarsening of hepatic parenchyma and recanalization of periumbilical vein suggestive of portal HTN. Currently no acute concerns.  - Follow up with [facility] as directed  - Abstinence from alcohol was strongly encouraged. Discussed risks of developing decompensated cirrhosis, more life threatening complications of cirrhosis, and need for liver transplant to treat. Also discussed that she may not be a transplant candidate in future if unable to maintain sobriety.      8. Hypothyroidism:  TSH 2.46.   - Continue PTA levothyroxine     9. MDD, Anxiety, Suicidal ideation:  Reported to ED provider that she was going to try and drink herself to death. Reports severe anxiety since being weaned off Klonopin.   - Management per primary team              The patient's care was discussed with the Bedside Nurse, Patient     Clinical Indicators found within the medical record:     Risk Factors:  Urinary retention with self catherization.     Signs/Symptoms:  10/26/2022/PN/Luzma: \"Initial UA on 10/24 negative. Repeat UA on 10/25 with +LE and 175 wbc/hpf on urine micro. Nitrites negative. UC pending, but currently reported as no growth.   Afebrile. WBC 12.3 today, overall stable.   At risk for CAUTI with intermittent self catheterization. Given current symptoms, UA results, and leukocytosis would err on side of treating for acute " "cystitis at this time.\"     Drugs/Tx:  Bactrim DS PO BID for 5 days            Pt was seen by cm  As per recommendations from cm    Met with Pt per her request.  Pt is teary and states she is anxious regarding being held here forever.  Discussed with Pt the importance of being stable prior to discharge and that having been drinking for 5 months return to stability and therapeutic levels of medications will not occur over night.  Recommended Pt engage in calming activities such as coloring and attend all groups on the unit.  Pt acknowledged understanding and reported feeling calmer following conversation.         Labs:reviewed with patient       Recent Results (from the past 48 hour(s))   Alcohol breath test POCT    Collection Time: 10/24/22 12:51 PM   Result Value Ref Range    Alcohol Breath Test 0.00 0.00 - 0.01   Comprehensive metabolic panel    Collection Time: 10/24/22  2:40 PM   Result Value Ref Range    Sodium 139 133 - 144 mmol/L    Potassium 3.9 3.4 - 5.3 mmol/L    Chloride 106 94 - 109 mmol/L    Carbon Dioxide (CO2) 24 20 - 32 mmol/L    Anion Gap 9 3 - 14 mmol/L    Urea Nitrogen 9 7 - 30 mg/dL    Creatinine 0.56 0.52 - 1.04 mg/dL    Calcium 9.1 8.5 - 10.1 mg/dL    Glucose 93 70 - 99 mg/dL    Alkaline Phosphatase 188 (H) 40 - 150 U/L    AST 81 (H) 0 - 45 U/L    ALT 46 0 - 50 U/L    Protein Total 8.1 6.8 - 8.8 g/dL    Albumin 4.4 3.4 - 5.0 g/dL    Bilirubin Total 0.9 0.2 - 1.3 mg/dL    GFR Estimate >90 >60 mL/min/1.73m2   INR    Collection Time: 10/24/22  2:40 PM   Result Value Ref Range    INR 1.14 0.85 - 1.15   CBC with platelets and differential    Collection Time: 10/24/22  2:40 PM   Result Value Ref Range    WBC Count 11.9 (H) 4.0 - 11.0 10e3/uL    RBC Count 4.18 3.80 - 5.20 10e6/uL    Hemoglobin 13.5 11.7 - 15.7 g/dL    Hematocrit 38.7 35.0 - 47.0 %    MCV 93 78 - 100 fL    MCH 32.3 26.5 - 33.0 pg    MCHC 34.9 31.5 - 36.5 g/dL    RDW 15.5 (H) 10.0 - 15.0 %    Platelet Count 191 150 - 450 10e3/uL    % " Neutrophils 58 %    % Lymphocytes 28 %    % Monocytes 11 %    % Eosinophils 1 %    % Basophils 1 %    % Immature Granulocytes 1 %    NRBCs per 100 WBC 0 <1 /100    Absolute Neutrophils 7.0 1.6 - 8.3 10e3/uL    Absolute Lymphocytes 3.3 0.8 - 5.3 10e3/uL    Absolute Monocytes 1.3 0.0 - 1.3 10e3/uL    Absolute Eosinophils 0.2 0.0 - 0.7 10e3/uL    Absolute Basophils 0.1 0.0 - 0.2 10e3/uL    Absolute Immature Granulocytes 0.1 <=0.4 10e3/uL    Absolute NRBCs 0.0 10e3/uL   Extra Red Top Tube    Collection Time: 10/24/22  2:40 PM   Result Value Ref Range    Hold Specimen JIC    GGT    Collection Time: 10/25/22  7:13 AM   Result Value Ref Range    GGT 1,817 (H) 0 - 40 U/L   Hemoglobin A1c    Collection Time: 10/25/22  7:13 AM   Result Value Ref Range    Hemoglobin A1C 5.6 0.0 - 5.6 %   Lipid panel    Collection Time: 10/25/22  7:13 AM   Result Value Ref Range    Cholesterol 179 <200 mg/dL    Triglycerides 84 <150 mg/dL    Direct Measure HDL 78 >=50 mg/dL    LDL Cholesterol Calculated 84 <=100 mg/dL    Non HDL Cholesterol 101 <130 mg/dL   TSH with free T4 reflex and/or T3 as indicated    Collection Time: 10/25/22  7:13 AM   Result Value Ref Range    TSH 2.46 0.40 - 4.00 mU/L   CBC with platelets    Collection Time: 10/25/22  7:13 AM   Result Value Ref Range    WBC Count 13.5 (H) 4.0 - 11.0 10e3/uL    RBC Count 4.08 3.80 - 5.20 10e6/uL    Hemoglobin 13.2 11.7 - 15.7 g/dL    Hematocrit 38.9 35.0 - 47.0 %    MCV 95 78 - 100 fL    MCH 32.4 26.5 - 33.0 pg    MCHC 33.9 31.5 - 36.5 g/dL    RDW 15.9 (H) 10.0 - 15.0 %    Platelet Count 184 150 - 450 10e3/uL   UA with Microscopic reflex to Culture    Collection Time: 10/25/22 11:25 AM    Specimen: Urine, Midstream   Result Value Ref Range    Color Urine Yellow Colorless, Straw, Light Yellow, Yellow    Appearance Urine Clear Clear    Glucose Urine Negative Negative mg/dL    Bilirubin Urine Negative Negative    Ketones Urine Negative Negative mg/dL    Specific Gravity Urine 1.010 1.003 -  1.035    Blood Urine Negative Negative    pH Urine 7.5 (H) 5.0 - 7.0    Protein Albumin Urine 20 (A) Negative mg/dL    Urobilinogen Urine Normal Normal, 2.0 mg/dL    Nitrite Urine Negative Negative    Leukocyte Esterase Urine Large (A) Negative    Bacteria Urine Few (A) None Seen /HPF    RBC Urine 5 (H) <=2 /HPF    WBC Urine 175 (H) <=5 /HPF    Squamous Epithelials Urine <1 <=1 /HPF   Urine Culture    Collection Time: 10/25/22 11:25 AM    Specimen: Urine, Midstream   Result Value Ref Range    Culture No growth, less than 1 day    Hepatic panel    Collection Time: 10/26/22  8:59 AM   Result Value Ref Range    Bilirubin Total 0.5 0.2 - 1.3 mg/dL    Bilirubin Direct 0.2 0.0 - 0.2 mg/dL    Protein Total 6.9 6.8 - 8.8 g/dL    Albumin 3.5 3.4 - 5.0 g/dL    Alkaline Phosphatase 158 (H) 40 - 150 U/L    AST 57 (H) 0 - 45 U/L    ALT 35 0 - 50 U/L   CBC with platelets    Collection Time: 10/26/22  8:59 AM   Result Value Ref Range    WBC Count 12.3 (H) 4.0 - 11.0 10e3/uL    RBC Count 3.90 3.80 - 5.20 10e6/uL    Hemoglobin 12.8 11.7 - 15.7 g/dL    Hematocrit 38.1 35.0 - 47.0 %    MCV 98 78 - 100 fL    MCH 32.8 26.5 - 33.0 pg    MCHC 33.6 31.5 - 36.5 g/dL    RDW 16.2 (H) 10.0 - 15.0 %    Platelet Count 152 150 - 450 10e3/uL         Recent Results (from the past 240 hour(s))   Asymptomatic COVID-19 Virus (Coronavirus) by PCR Nasopharyngeal    Collection Time: 10/24/22 11:52 AM    Specimen: Nasopharyngeal; Swab   Result Value Ref Range    SARS CoV2 PCR Negative Negative   HCG qualitative urine    Collection Time: 10/24/22 12:29 PM   Result Value Ref Range    hCG Urine Qualitative Negative Negative   Drug abuse screen 1 urine (ED)    Collection Time: 10/24/22 12:29 PM   Result Value Ref Range    Amphetamines Urine Screen Negative Screen Negative    Barbiturates Urine Screen Negative Screen Negative    Benzodiazepines Urine Screen Negative Screen Negative    Cannabinoids Urine Screen Negative Screen Negative    Cocaine Urine Screen  Negative Screen Negative    Opiates Urine Screen Negative Screen Negative   UA with Microscopic reflex to Culture    Collection Time: 10/24/22 12:29 PM    Specimen: Urine, Clean Catch   Result Value Ref Range    Color Urine Straw Colorless, Straw, Light Yellow, Yellow    Appearance Urine Clear Clear    Glucose Urine Negative Negative mg/dL    Bilirubin Urine Negative Negative    Ketones Urine Negative Negative mg/dL    Specific Gravity Urine 1.005 1.003 - 1.035    Blood Urine Negative Negative    pH Urine 6.5 5.0 - 7.0    Protein Albumin Urine Negative Negative mg/dL    Urobilinogen Urine Normal Normal, 2.0 mg/dL    Nitrite Urine Negative Negative    Leukocyte Esterase Urine Negative Negative    Bacteria Urine Few (A) None Seen /HPF    RBC Urine <1 <=2 /HPF    WBC Urine 1 <=5 /HPF    Squamous Epithelials Urine 1 <=1 /HPF   Alcohol breath test POCT    Collection Time: 10/24/22 12:51 PM   Result Value Ref Range    Alcohol Breath Test 0.00 0.00 - 0.01   Comprehensive metabolic panel    Collection Time: 10/24/22  2:40 PM   Result Value Ref Range    Sodium 139 133 - 144 mmol/L    Potassium 3.9 3.4 - 5.3 mmol/L    Chloride 106 94 - 109 mmol/L    Carbon Dioxide (CO2) 24 20 - 32 mmol/L    Anion Gap 9 3 - 14 mmol/L    Urea Nitrogen 9 7 - 30 mg/dL    Creatinine 0.56 0.52 - 1.04 mg/dL    Calcium 9.1 8.5 - 10.1 mg/dL    Glucose 93 70 - 99 mg/dL    Alkaline Phosphatase 188 (H) 40 - 150 U/L    AST 81 (H) 0 - 45 U/L    ALT 46 0 - 50 U/L    Protein Total 8.1 6.8 - 8.8 g/dL    Albumin 4.4 3.4 - 5.0 g/dL    Bilirubin Total 0.9 0.2 - 1.3 mg/dL    GFR Estimate >90 >60 mL/min/1.73m2   INR    Collection Time: 10/24/22  2:40 PM   Result Value Ref Range    INR 1.14 0.85 - 1.15   CBC with platelets and differential    Collection Time: 10/24/22  2:40 PM   Result Value Ref Range    WBC Count 11.9 (H) 4.0 - 11.0 10e3/uL    RBC Count 4.18 3.80 - 5.20 10e6/uL    Hemoglobin 13.5 11.7 - 15.7 g/dL    Hematocrit 38.7 35.0 - 47.0 %    MCV 93 78 - 100  fL    MCH 32.3 26.5 - 33.0 pg    MCHC 34.9 31.5 - 36.5 g/dL    RDW 15.5 (H) 10.0 - 15.0 %    Platelet Count 191 150 - 450 10e3/uL    % Neutrophils 58 %    % Lymphocytes 28 %    % Monocytes 11 %    % Eosinophils 1 %    % Basophils 1 %    % Immature Granulocytes 1 %    NRBCs per 100 WBC 0 <1 /100    Absolute Neutrophils 7.0 1.6 - 8.3 10e3/uL    Absolute Lymphocytes 3.3 0.8 - 5.3 10e3/uL    Absolute Monocytes 1.3 0.0 - 1.3 10e3/uL    Absolute Eosinophils 0.2 0.0 - 0.7 10e3/uL    Absolute Basophils 0.1 0.0 - 0.2 10e3/uL    Absolute Immature Granulocytes 0.1 <=0.4 10e3/uL    Absolute NRBCs 0.0 10e3/uL   Extra Red Top Tube    Collection Time: 10/24/22  2:40 PM   Result Value Ref Range    Hold Specimen JIC    GGT    Collection Time: 10/25/22  7:13 AM   Result Value Ref Range    GGT 1,817 (H) 0 - 40 U/L   Hemoglobin A1c    Collection Time: 10/25/22  7:13 AM   Result Value Ref Range    Hemoglobin A1C 5.6 0.0 - 5.6 %   Lipid panel    Collection Time: 10/25/22  7:13 AM   Result Value Ref Range    Cholesterol 179 <200 mg/dL    Triglycerides 84 <150 mg/dL    Direct Measure HDL 78 >=50 mg/dL    LDL Cholesterol Calculated 84 <=100 mg/dL    Non HDL Cholesterol 101 <130 mg/dL   TSH with free T4 reflex and/or T3 as indicated    Collection Time: 10/25/22  7:13 AM   Result Value Ref Range    TSH 2.46 0.40 - 4.00 mU/L   CBC with platelets    Collection Time: 10/25/22  7:13 AM   Result Value Ref Range    WBC Count 13.5 (H) 4.0 - 11.0 10e3/uL    RBC Count 4.08 3.80 - 5.20 10e6/uL    Hemoglobin 13.2 11.7 - 15.7 g/dL    Hematocrit 38.9 35.0 - 47.0 %    MCV 95 78 - 100 fL    MCH 32.4 26.5 - 33.0 pg    MCHC 33.9 31.5 - 36.5 g/dL    RDW 15.9 (H) 10.0 - 15.0 %    Platelet Count 184 150 - 450 10e3/uL   UA with Microscopic reflex to Culture    Collection Time: 10/25/22 11:25 AM    Specimen: Urine, Midstream   Result Value Ref Range    Color Urine Yellow Colorless, Straw, Light Yellow, Yellow    Appearance Urine Clear Clear    Glucose Urine  Negative Negative mg/dL    Bilirubin Urine Negative Negative    Ketones Urine Negative Negative mg/dL    Specific Gravity Urine 1.010 1.003 - 1.035    Blood Urine Negative Negative    pH Urine 7.5 (H) 5.0 - 7.0    Protein Albumin Urine 20 (A) Negative mg/dL    Urobilinogen Urine Normal Normal, 2.0 mg/dL    Nitrite Urine Negative Negative    Leukocyte Esterase Urine Large (A) Negative    Bacteria Urine Few (A) None Seen /HPF    RBC Urine 5 (H) <=2 /HPF    WBC Urine 175 (H) <=5 /HPF    Squamous Epithelials Urine <1 <=1 /HPF   Urine Culture    Collection Time: 10/25/22 11:25 AM    Specimen: Urine, Midstream   Result Value Ref Range    Culture No growth, less than 1 day    Hepatic panel    Collection Time: 10/26/22  8:59 AM   Result Value Ref Range    Bilirubin Total 0.5 0.2 - 1.3 mg/dL    Bilirubin Direct 0.2 0.0 - 0.2 mg/dL    Protein Total 6.9 6.8 - 8.8 g/dL    Albumin 3.5 3.4 - 5.0 g/dL    Alkaline Phosphatase 158 (H) 40 - 150 U/L    AST 57 (H) 0 - 45 U/L    ALT 35 0 - 50 U/L   CBC with platelets    Collection Time: 10/26/22  8:59 AM   Result Value Ref Range    WBC Count 12.3 (H) 4.0 - 11.0 10e3/uL    RBC Count 3.90 3.80 - 5.20 10e6/uL    Hemoglobin 12.8 11.7 - 15.7 g/dL    Hematocrit 38.1 35.0 - 47.0 %    MCV 98 78 - 100 fL    MCH 32.8 26.5 - 33.0 pg    MCHC 33.6 31.5 - 36.5 g/dL    RDW 16.2 (H) 10.0 - 15.0 %    Platelet Count 152 150 - 450 10e3/uL            Because this patient meets criteria for an Alcohol Use Disorder, I performed the following brief intervention on the date of this note:              1) Expressed concern that the patient is drinking at unhealthy levels known to increase their risk of alcohol related problems              2) Gave feedback linking alcohol use and health, including personalized feedback explaining how alcohol use can interact with their medical and/or psychiatric problems, and with prescribed medications.              3) Advised patient to abstain.    PT counseled on nicotine  cessation and nicotine replacement provided  She was given information about the empath unit if she feels like she decompensated, she is also to call 911 if this changes and she develops suicidal ideation  Discussed with patient many issues of addiction,triggers, relapse, and establishing a solid recovery program.    DISCHARGE MENTAL STATUS EXAMINATION:  The patient is alert, oriented x3.  Good fund of knowledge.  Good use of language.  Recent and remote memory, language, fund of knowledge are all adequate.  Euthymic mood congruent affect  Speech normal rate/rhythm linear tp no loose asso,The patient does not have any active suicidal or homicidal ideation.  Does not have any auditory or visual hallucination.  Fair insight/judgment At this time, the patient was stable to be discharged.        Pt was not determined to not be a danger to himself or others. At the current time of discharge, the patient does not meet criteria for involuntary hospitalization. On the day of discharge, the patient reports that they do not have suicidal or homicidal ideation and would never hurt themselves or others. Steps taken to minimize risk include: assessing patient s behavior and thought process daily during hospital stay, discharging patient with adequate plan for follow up for mental and physical health and discussing safety plan of returning to the hospital should the patient ever have thoughts of harming themselves or others. Therefore, based on all available evidence including the factors cited above, the patient does not appear to be at imminent risk for self-harm, and is appropriate for outpatient level of care.     Educated about side effects/risk vs benefits /alternative including non treatment.Pt consented to be on medication.     .Total time spent on discharge summary more than 35 min  More than  20 min  planning, coordination of care, medication reconciliation and performance of physical exam on day of discharge.Care was  coordinated with unit RN and unit therapist       Review of your medicines      START taking      Dose / Directions   Hydrophor Oint  Used for: Eczema, unspecified type      Externally apply topically daily  Quantity: 454 g  Refills: 3     sulfamethoxazole-trimethoprim 800-160 MG tablet  Commonly known as: BACTRIM DS  Used for: Urinary tract infection associated with catheterization of urinary tract, unspecified indwelling urinary catheter type, initial encounter (H)      Dose: 1 tablet  Take 1 tablet by mouth 2 times daily  Quantity: 10 tablet  Refills: 0     thiamine 100 MG tablet  Commonly known as: B-1  Used for: Urine retention      Dose: 100 mg  Start taking on: October 27, 2022  Take 1 tablet (100 mg) by mouth daily  Quantity: 30 tablet  Refills: 0     traZODone 50 MG tablet  Commonly known as: DESYREL  Used for: Urine retention      Dose: 50 mg  Take 1 tablet (50 mg) by mouth nightly as needed for sleep (may repeat after 60 minutes)  Quantity: 30 tablet  Refills: 0        CONTINUE these medicines which have NOT CHANGED      Dose / Directions   atorvastatin 10 MG tablet  Commonly known as: LIPITOR  Used for: Hyperlipidemia LDL goal <130      TAKE 1 TABLET BY MOUTH  DAILY  Quantity: 90 tablet  Refills: 0     carvedilol 12.5 MG tablet  Commonly known as: COREG  Used for: Benign essential hypertension      TAKE 1 TABLET BY MOUTH  TWICE DAILY WITH MEALS  Quantity: 180 tablet  Refills: 2     cyclobenzaprine 10 MG tablet  Commonly known as: FLEXERIL  Used for: Bipolar I disorder (H)      Dose: 10 mg  Take 1 tablet (10 mg) by mouth At Bedtime  Quantity: 90 tablet  Refills: 3     folic acid 400 MCG tablet  Commonly known as: FOLVITE      Dose: 400 mcg  Take 400 mcg by mouth daily  Refills: 0     hydrOXYzine 50 MG tablet  Commonly known as: ATARAX  Used for: Anxiety disorder, unspecified type      Dose: 50 mg  Take 1 tablet (50 mg) by mouth 3 times daily as needed for anxiety or other (sleep)  Quantity: 90  tablet  Refills: 0     levothyroxine 50 MCG tablet  Commonly known as: SYNTHROID/LEVOTHROID  Used for: Acquired hypothyroidism      TAKE 1 TABLET BY MOUTH  DAILY  Quantity: 90 tablet  Refills: 2     mirtazapine 30 MG tablet  Commonly known as: REMERON  Used for: Bipolar I disorder (H)      Dose: 60 mg  Take 2 tablets (60 mg) by mouth At Bedtime  Quantity: 180 tablet  Refills: 0     nitroFURantoin macrocrystal-monohydrate 100 MG capsule  Commonly known as: MACROBID  Used for: Recurrent UTI, Acute cystitis without hematuria      Dose: 100 mg  Take 1 capsule (100 mg) by mouth See Admin Instructions Take 100 mg twice a day for one week, Then take 100 mg once daily  Quantity: 90 capsule  Refills: 0     norethindrone 0.35 MG tablet  Commonly known as: MICRONOR  Used for: OCP (oral contraceptive pills) initiation      Dose: 0.35 mg  Take 1 tablet (0.35 mg) by mouth daily continuously  Quantity: 90 tablet  Refills: 3     * OLANZapine 20 MG tablet  Commonly known as: zyPREXA  Used for: Bipolar I disorder (H)      Dose: 20 mg  Take 1 tablet (20 mg) by mouth At Bedtime along with one 5 mg tablet for total bedtime dose of 25 mg  Quantity: 90 tablet  Refills: 0     * OLANZapine 5 MG tablet  Commonly known as: zyPREXA  Used for: Bipolar I disorder (H)      Take 1 tablet (5 mg) by mouth At Bedtime. May also take 1 tablet (5 mg) 3 times daily as needed (anxiety, mixed symptoms). Do all this for 90 days.  Quantity: 120 tablet  Refills: 0     pantoprazole 40 MG EC tablet  Commonly known as: PROTONIX  Used for: Gastroesophageal reflux disease without esophagitis      TAKE 1 TABLET BY MOUTH  TWICE DAILY  Quantity: 180 tablet  Refills: 3     polyethylene glycol 17 g packet  Commonly known as: MIRALAX      Dose: 1 packet  Take 1 packet by mouth daily as needed for constipation  Refills: 0     SENNA S PO      Dose: 1 tablet  Take 1 tablet by mouth daily  Refills: 0     SUMAtriptan 25 MG tablet  Commonly known as: IMITREX      Dose: 25  "mg  Take 25 mg by mouth at onset of headache for migraine  Refills: 0     Syringe Luer Lock 25G X 1\" 3 ML Misc  Used for: Intractable migraine without status migrainosus, unspecified migraine type      Dose: 1 Act  1 Act once a week  Quantity: 10 each  Refills: 0     WOMENS MULTI VITAMIN & MINERAL PO      Dose: 1 tablet  Take 1 tablet by mouth daily  Refills: 0         * This list has 2 medication(s) that are the same as other medications prescribed for you. Read the directions carefully, and ask your doctor or other care provider to review them with you.            STOP taking    acetaminophen 500 MG tablet  Commonly known as: TYLENOL        albuterol 108 (90 Base) MCG/ACT inhaler  Commonly known as: PROAIR HFA/PROVENTIL HFA/VENTOLIN HFA        clonazePAM 0.5 MG tablet  Commonly known as: klonoPIN        clonazePAM 1 MG tablet  Commonly known as: klonoPIN        ketorolac 30 MG/ML injection  Commonly known as: TORADOL        ondansetron 4 MG tablet  Commonly known as: ZOFRAN              Where to get your medicines      These medications were sent to [facility] [address]  [address] [address]    Phone: [phone]     sulfamethoxazole-trimethoprim 800-160 MG tablet    thiamine 100 MG tablet    traZODone 50 MG tablet       Disposition: Home     Facts about COVID19 at www.cdc.gov/COVID19 and www.[place].gov/covid19     Keeping hands clean is one of the most important steps we can take to avoid getting sick and spreading germs to others.  Please wash your hands frequently and lather with soap for at least 20 seconds!     Medical Follow-Up:  Oct27 Adult Med Follow UP 3:25 PM   Rosi Donis MD            Treatment Follow-Up:Your intake with S is scheduled for Monday November 7th @ 1:00 PM.  You will be sent a confirmation text when insurance is verified.  This is a virtual appointment and an e-mail will be sent with access to the appointment.  .  At " "the time of discharge patient denies any active suicidal ideation plan or intent.  She wants to live she wants to get better.  She has her son to think of.        \"Much or all of the text in this note was generated through the use of Dragon Dictate voice to text software. Errors in spelling or words which appear to be out of contact are unintentional, may be present due having escaped editing\"     "

## 2022-10-26 NOTE — PLAN OF CARE
Problem: Substance Withdrawal  Goal: Social and Therapeutic (Substance Withdrawal)  Description: Signs and symptoms of listed problems will be absent or manageable.  Outcome: Progressing   Goal Outcome Evaluation:    Plan of Care Reviewed With: patient    Pt alert, visible for meals, tearful and anxious at meals, rating anxiety 10/10, denies depression or SI/HI, Zyprexa scheduled and Hydroxyzine as needed with relief, pt ate >80% breakfast meal, encouraged to utilize coping skills, BP at breakfast 154/99, MSSA 4, observed lying down in room at 0930 after hydroxyzine, /71. Pt resting camly  in bed, rachel for safety. Pt out of detox at this time.

## 2022-10-26 NOTE — TELEPHONE ENCOUNTER
[facility] Call Center    Phone Message    May a detailed message be left on voicemail: yes     Reason for Call: Other:  from inpt at [facility] calling to speak with  or nurse about pt's recent stay during hospitalization. Pt is done with detox and back on medication. Pt also plans to do DBT programming. The doctor can be reached at her pager number: [phone]     Action Taken: Other: [facility] psych pool and     Travel Screening: Not Applicable     Follow Up:  Writer returned call to Dr. Bach who states that she is planning to discharge patient. She states that patient was not doing well upon admission due to multiple stressors, including her ex getting remarried. She states patient had set up all of her medications up with a plan to overdose and she was brought in by a significant other after her supports attempted an intervention and she escaped.     Patient is currently out of detox and back on her medications. The plan is for her to start DBT on 11/7 and she has an appointment with Dr. Donis tomorrow. Patient denies SI, SIB, HI and hallucinations and hospitalization is no longer warranted due to concern for decompensation.

## 2022-10-26 NOTE — PLAN OF CARE
Problem: Suicidal Behavior  Goal: Suicidal Behavior is Absent or Managed  Outcome: Progressing   Goal Outcome Evaluation:    Plan of Care Reviewed With: patient        Pt visible in the milieu this shift, attending groups and socializing with peers, denies pain, SI/HI,AV or urges to hurt self or others, eating well, MSSA this shift 3/3, pt contracts for safety.

## 2022-10-26 NOTE — CONSULTS
[facility]  Consult Note - Hospitalist Service  Date of Admission:  10/24/2022  Consult Requested by: Dr. Bach  Reason for Consult: Medical co-management    Assessment & Plan   Ana Laura Wharton is a 52 year old female with history of alcohol use disorder, MDD, anxiety, bipolar disorder, HTN, GERD, emphysema, recurrent spontaneous pneumothoraces s/p pleurodesis, chronic liver disease, hypothyroidism, migraines, and urinary retention who was admitted to unit [facility] on 10/24/2022 for alcohol detox.     1. Alcohol use disorder:  Longstanding history of alcohol use with intermittent periods of sobriety. Relapsed in June 2022, drinking intermittently since then. Admits to drinking 500 mL of hard alcohol daily x 2 weeks in setting of klonopin taper and increased anxiety. Currently having mild withdrawal symptoms. No history of withdrawal seizures or DTs.   - MSS protocol  - Thiamine and folate while admitted  - Further management per primary team    2. Benzodiazepine dependence:  Completed a klonopin taper prior to admission.   - Management per primary team    3. Transaminitis:  AST 81, ALT 46 with ratio 2:1 c/w alcohol use. Bilirubin normal. Alk Phos 188 with significantly elevated GGT again c/w alcohol use. Anticipate improvement with abstinence.   - Repeat LFTs on 10/26    4. Leukocytosis:  WBC 11.9 on arrival. Afebrile. Reports some pelvic pain c/w prior UTI. Patient has to straight cath 4x daily due to retention, therefore at increased risk. UA negative in ED. Repeat WBC 13.5 today. ? Infection vs stress demargination.   - Repeat UA  - Repeat CBC in AM     5. Constipation:  Abdominal exam benign. No nausea or vomiting.   - Miralax daily  - Senokot 2 tabs BID    6. Urinary retention:  Unclear etiology. Patient reports needing to straight cath 4x daily.  - Straight cath per home routine  - Follow up with urology as outpatient     7. Chronic liver disease:  Patient reports  that she was diagnosed with stage IV cirrhosis by hepatology at [facility]. Patient does not appear to have any complications of this. Her abdomen is soft and w/o evidence of ascites. Her bilirubin is normal. No jaundice noted. No history of variceal bleed. Recent US 8/29/22 showed diffuse coarsening of hepatic parenchyma and recanalization of periumbilical vein suggestive of portal HTN. Currently no acute concerns.  - Follow up with [facility] as directed  - Abstinence from alcohol was strongly encouraged. Discussed risks of developing decompensated cirrhosis, more life threatening complications of cirrhosis, and need for liver transplant to treat. Also discussed that she may not be a transplant candidate in future if unable to maintain sobriety.     8. Hypothyroidism:  TSH 2.46.   - Continue PTA levothyroxine    9. MDD, Anxiety, Suicidal ideation:  Reported to ED provider that she was going to try and drink herself to death. Reports severe anxiety since being weaned off Klonopin.   - Management per primary team         The patient's care was discussed with the Bedside Nurse, Patient and Primary team.    NAYANA Ayala [facility]  Securely message with the Vocera Web Console (learn more here)  Text page via [facility] Paging/Directory       Hospitalist Service    Clinically Significant Risk Factors Present on Admission                              ______________________________________________________________________    Chief Complaint   Alcohol detox    History is obtained from the patient    History of Present Illness   Ana Laura Wharton is a 52 year old female with history of alcohol use disorder, benzo dependence, MDD, anxiety, bipolar disorder, chronic liver disease, and hypothyroidism who was admitted to unit 3[facility] for alcohol detox. Patient relapsing on alcohol in June 2022 after 18 months of sobriety. She has been drinking intermittently since then. She was also on klonopin  for anxiety, which she was gradually off. She notes heavier alcohol use over the past few weeks as her klonopin was being weaned. She attributes this to worsening anxiety. She has been drinking 500 mL of hard alcohol daily for the past 2 weeks. Her last dose of klonopin was the day prior to admission. Her last drink was prior to admission. She denies any history of withdrawal seizures or DT's. She currently reports mild tremor, insomnia, and severe anxiety. She also notes constipation. Her last BM was today but it was small and hard. Miralax and senokot usually are effective but she has not been taking them. She also notes a history of urinary retention. The etiology of this is unclear but she straight caths 4x daily. She feels like she may be getting a UTI as she has been having some pelvic pain today and noted that her urine looked more concentrated.     Review of Systems   The 10 point Review of Systems is negative other than noted in the HPI or here.     Past Medical History    I have reviewed this patient's medical history and updated it with pertinent information if needed.   Past Medical History:   Diagnosis Date     Abnormal Pap smear of cervix 01/09/2019    see problem list     Anxiety      Bipolar disorder (H)      C. difficile colitis      Cervical high risk HPV (human papillomavirus) test positive 01/09/2019 & 2/19/2020    see problem list     Depressive disorder      Essential hypertension 7/8/2015     Gastro-oesophageal reflux disease      History of colposcopy 03/03/2020    see problem list     Hypothyroidism 4/1/2015     Migraine      Pulmonary emphysema, unspecified emphysema type (H) 1/19/2022     Spontaneous pneumothorax     x3, resolved w/ pleurodesis      STD (sexually transmitted disease)      Suicide attempt (H)        Past Surgical History   I have reviewed this patient's surgical history and updated it with pertinent information if needed.  Past Surgical History:   Procedure Laterality Date      ARTHROSCOPY KNEE      L knee     BLADDER SURGERY       CERVIX SURGERY      for pre-cancerous changes     COLONOSCOPY Left 3/3/2021    Procedure: COLONOSCOPY, WITH POLYPECTOMY USING COLD SNARE;  Surgeon: Duane Connell MD;  Location: [facility]     left knee surgery       ORTHOPEDIC SURGERY      L shoulder     THORACIC SURGERY      for pneumothorax, pleurodesis and lobe resection       Social History   I have reviewed this patient's social history and updated it with pertinent information if needed.  Social History     Tobacco Use     Smoking status: Every Day     Packs/day: 1.00     Types: Cigarettes     Smokeless tobacco: Never   Vaping Use     Vaping Use: Never used   Substance Use Topics     Alcohol use: Yes     Alcohol/week: 2.0 standard drinks     Types: 2 Glasses of wine per week     Comment: at times 1/2 bottle of vodka     Drug use: Not Currently     Frequency: 0.5 times per week     Comment: occ       Family History   I have reviewed this patient's family history and updated it with pertinent information if needed.  Family History   Problem Relation Age of Onset     Depression Mother      Pacemaker Mother      Osteoporosis Mother      Lipids Father         hyperlipidemia     Macular Degeneration Father      No Known Problems Brother      No Known Problems Son      Colon Cancer No family hx of        Medications   I have reviewed this patient's current medications  Medications Prior to Admission   Medication Sig Dispense Refill Last Dose     acetaminophen (TYLENOL) 500 MG tablet Take 1-2 tablets (500-1,000 mg) by mouth every 6 hours as needed for mild pain   Past Week     atorvastatin (LIPITOR) 10 MG tablet TAKE 1 TABLET BY MOUTH  DAILY 90 tablet 0 10/23/2022     carvedilol (COREG) 12.5 MG tablet TAKE 1 TABLET BY MOUTH  TWICE DAILY WITH MEALS 180 tablet 2 10/23/2022     cyclobenzaprine (FLEXERIL) 10 MG tablet Take 1 tablet (10 mg) by mouth At Bedtime 90 tablet 3 10/22/2022     folic acid (FOLVITE) 400 MCG  tablet Take 400 mcg by mouth daily   10/23/2022     hydrOXYzine (ATARAX) 50 MG tablet Take 1 tablet (50 mg) by mouth 3 times daily as needed for anxiety or other (sleep) 90 tablet 0 10/23/2022     ketorolac (TORADOL) 30 MG/ML injection Inject 30 mg into the muscle as needed   Past Month     levothyroxine (SYNTHROID/LEVOTHROID) 50 MCG tablet TAKE 1 TABLET BY MOUTH  DAILY 90 tablet 2 10/23/2022     mirtazapine (REMERON) 30 MG tablet Take 2 tablets (60 mg) by mouth At Bedtime 180 tablet 0 10/22/2022     Multiple Vitamins-Minerals (WOMENS MULTI VITAMIN & MINERAL PO) Take 1 tablet by mouth daily   Past Week     nitroFURantoin macrocrystal-monohydrate (MACROBID) 100 MG capsule Take 1 capsule (100 mg) by mouth See Admin Instructions Take 100 mg twice a day for one week, Then take 100 mg once daily 90 capsule 0 10/23/2022     norethindrone (MICRONOR) 0.35 MG tablet Take 1 tablet (0.35 mg) by mouth daily continuously 90 tablet 3 10/23/2022     OLANZapine (ZYPREXA) 20 MG tablet Take 1 tablet (20 mg) by mouth At Bedtime along with one 5 mg tablet for total bedtime dose of 25 mg 90 tablet 0 10/22/2022     OLANZapine (ZYPREXA) 5 MG tablet Take 1 tablet (5 mg) by mouth At Bedtime. May also take 1 tablet (5 mg) 3 times daily as needed (anxiety, mixed symptoms). Do all this for 90 days. 120 tablet 0 10/23/2022     ondansetron (ZOFRAN) 4 MG tablet Take 1 tablet (4 mg) by mouth every 8 hours as needed for nausea 30 tablet 0 Past Month     pantoprazole (PROTONIX) 40 MG EC tablet TAKE 1 TABLET BY MOUTH  TWICE DAILY 180 tablet 3 Past Week     polyethylene glycol (MIRALAX) 17 g packet Take 1 packet by mouth daily as needed for constipation   Past Month     Sennosides-Docusate Sodium (SENNA S PO) Take 1 tablet by mouth daily   10/23/2022     SUMAtriptan (IMITREX) 25 MG tablet Take 25 mg by mouth at onset of headache for migraine   Unknown     albuterol (PROAIR HFA/PROVENTIL HFA/VENTOLIN HFA) 108 (90 Base) MCG/ACT inhaler Inhale 2 puffs  "into the lungs every 6 hours as needed for shortness of breath / dyspnea or wheezing (Patient not taking: Reported on 10/24/2022) 1 Inhaler 0 Not Taking     clonazePAM (KLONOPIN) 0.5 MG tablet Take 1 tablet (0.5 mg) by mouth every morning Take this in addition to other prescription for total of 1.0 mg in the morning. (Patient not taking: Reported on 10/24/2022) 14 tablet 0 Not Taking     clonazePAM (KLONOPIN) 1 MG tablet Take 1 tablet by mouth each morning and 2 tablets by mouth each bedtime for 2 weeks (Total Daily Dose 3mg). After two weeks, reduce dose down to 1/2 tablet by mouth each morning and 2 tablets by mouth each bedtime thereafter (Total Daily Dose 2.5mg). (Patient not taking: Reported on 10/24/2022) 82 tablet 0 Not Taking     Emollient (HYDROPHOR) OINT Externally apply topically daily (Patient not taking: Reported on 10/24/2022) 454 g 3 Not Taking     Syringe/Needle, Disp, (SYRINGE LUER LOCK) 25G X 1\" 3 ML MISC 1 Act once a week 10 each 0        Allergies   Allergies   Allergen Reactions     Ciprofloxacin Other (See Comments)     Joint pain cdiff     Compazine [Prochlorperazine] Other (See Comments)     dystonia     Metoclopramide Other (See Comments)     \"I feel like I am crawling out of my skin\"     Reglan [Metoclopramide Hcl] Other (See Comments)     Sensation of \"crawling out of skin\"     Restoril [Temazepam]      Thorazine [Chlorpromazine] Other (See Comments)     dystonia     Abilify [Aripiprazole] Rash     Lamotrigine Rash     Severe drug rash - contraindication to receiving again. Skin peeling.        Physical Exam   Vital Signs: Temp: 97.1  F (36.2  C) Temp src: Temporal BP: 103/67 Pulse: 87   Resp: 16 SpO2: 97 % O2 Device: None (Room air)    Weight: 129 lbs 0 oz    General Appearance:  Appears stated age. Awake. Alert. Oriented x3. NAD.   HEENT:  No scleral icterus. Moist mucous membranes.   Respiratory:  Normal effort. CTAB.   Cardiovascular:  RRR. S1,S2. No murmurs or gallops.   GI:  Soft, " ND, hypoactive BS. Mild suprapubic tenderness. No guarding or rebound. No mass. No HSM.   Extremity:  No pitting edema. No calf tenderness.   Skin:  No visible rash. No jaundice.   Neuro:  Grossly non-focal.  Psych:  Appropriate mood and affect.      Data   Recent Labs   Lab 10/24/22  1440      POTASSIUM 3.9   CHLORIDE 106   CO2 24   ANIONGAP 9   BUN 9   CR 0.56   ISAURO 9.1   PROTTOTAL 8.1   ALBUMIN 4.4   BILITOTAL 0.9   ALKPHOS 188*   AST 81*   ALT 46     Recent Labs   Lab 10/25/22  0713 10/24/22  1440   WBC 13.5* 11.9*   RBC 4.08 4.18   HGB 13.2 13.5   HCT 38.9 38.7   MCV 95 93   MCH 32.4 32.3   MCHC 33.9 34.9   RDW 15.9* 15.5*    191     Recent Labs   Lab 10/24/22  1440   INR 1.14     No lab results found in last 7 days.   Recent Labs   Lab 10/25/22  0713   TSH 2.46     No lab results found in last 7 days.  Recent Labs   Lab 10/24/22  1440   GLC 93
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
## 2022-10-27 NOTE — PROGRESS NOTES
Ana Laura Wharton is a 52 year old who is being evaluated via a billable video visit.      Pt will join video visit via: Minglebox  If there are problems joining the visit, send backup video invite via: Send to preferred e-mail: [email or web address]    Reason for telehealth visit: Patient convenience (e.g. access to timely appointments / distance to available provider)    Originating location (patient location): Patient's home    Will anyone else be joining the visit? No

## 2022-10-27 NOTE — PATIENT INSTRUCTIONS
- no med changes, continue adjustments per recent discharge (discontinue benzo and continue trazodone)   - follow-up in 1 week     **For crisis resources, please see the information at the end of this document**   Patient Education    Thank you for coming to the [facility] [place] [facility].     Lab Testing:  If you had lab testing today and your results are reassuring or normal they will be mailed to you or sent through [employer] within 7 days. If the lab tests need quick action we will call you with the results. The phone number we will call with results is # [phone]. If this is not the best number please call our clinic and change the number.     Medication Refills:  If you need any refills please call your pharmacy and they will contact us. Our fax number for refills is [phone].   Three business days of notice are needed for general medication refill requests.   Five business days of notice are needed for controlled substance refill requests.   If you need to change to a different pharmacy, please contact the new pharmacy directly. The new pharmacy will help you get your medications transferred.     Contact Us:  Please call [phone] during business hours (8-5:00 M-F).   If you have medication related questions after clinic hours, or on the weekend, please call [phone].     Financial Assistance [phone]   Medical Records [phone]       MENTAL HEALTH CRISIS RESOURCES:  For a emergency help, please call 911 or go to the nearest Emergency Department.     Emergency Walk-In Options:   [facility] Unit @ [facility] ([place]): [phone] - Specialized mental health emergency area designed to be calming  [facility] ([place]): [phone]  [facility] ([place]): [phone]  [facility]): [phone]    [facility] Crisis Information:   [place]: [phone]  [place]: [phone]  [place])  - Adult: [phone]     Child: [phone]  [place] - Adult: [phone]     Child: [phone]  [place]: [phone]  List of all [facility] resources:   https://[place].gov/dhs/people-we-serve/adults/health-care/mental-health/resources/crisis-contacts.jsp    National Crisis Information:   Crisis Text Line: Text  [place]  to 488960  Suicide & Crisis Lifeline: 988  National Suicide Prevention Lifeline: 9-633-575-TALK (1-373.163.6142)       For online chat options, visit https://suicidepreventionlifeline.org/chat/  Poison Control Center: 1-710.249.3447  Trans Lifeline: 1-460.136.8406 - Hotline for transgender people of all ages  [employer]: 7-850-040-0944 - Hotline for LGBT youth     For Non-Emergency Support:   Fast Tracker: Mental Health & Substance Use Disorder Resources -   https://www.[employer] Managementtrackermn.org/

## 2022-10-28 NOTE — PROGRESS NOTES
Clinic Care Coordination Contact  [facility]: Post-Discharge Note  SITUATION                                                      Admission:    Admission Date: 10/24/22   Reason for Admission: PTSD  Generalized anxiety disorder  Alcohol use disorder severe  Discharge:   Discharge Date: 10/26/22  Discharge Diagnosis: PTSD  Generalized anxiety disorder  Alcohol use disorder severe    BACKGROUND                                                      PATIENT was admitted to Station 3Awith attending  under DR diaz, please review the detailed admit note on day 10/ 25/2022   The patient was placed under status 15 (15 minute checks) to ensure patient safety.   MSSA protocol was initiated due to the patient's history of alcohol abuse and concern for withdrawal symptoms.  CBC, BMP and utox obtained.     All outpatient medications were continued  Patient was restarted back on Remeron 60 mg and Zyprexa 30 mg in divided doses  Patient was detoxed off MSSA protocol on Ativan because patient has history of cirrhosis  She completed her withdrawal  Her energy motivation sleep interest improved  She did not want to kill herself she wants to live she wants to get better.  She reports that she has her son     After patient signed consent I spoke with patient's significant other and told the patient is being discharged.  He did not have any concerns about her discharged he is willing to come and pick her up.  She has an appointment tomorrow with her psychiatrist at 3:40 virtually.  I spoke with her nurse and gave the information to the treatment team.  On the unit patient has been visible     She has consistently denied any suicidal ideation plan or intent she has been eating cooperative.  She denied any suicide ideation plan or intent to the nurses to the .  She wants to live she wants to get better        PATIENTdid participate in groups and was visible in the milieu.      The patient's symptoms of alcohol  withdrawal improved.     Patients energy motivation , sleep appetite improved.  Pt completed detox . It was un eventful.  ASSESSMENT           Discharge Assessment  How are you doing now that you are home?: Recvd message from patient informing she is doing great and shares that she has remained sober and that her stay at [facility] was wonderful. No needs/concerns at this time and plans to attend all upcoming appointments.  How are your symptoms? (Red Flag symptoms escalate to triage hotline per guidelines): Improved  Do you feel your condition is stable enough to be safe at home until your provider visit?: Yes  Does the patient have their discharge instructions? : Yes  Does the patient have questions regarding their discharge instructions? : No  Were you started on any new medications or were there changes to any of your previous medications? : Yes  Does the patient have all of their medications?: Yes  Do you have questions regarding any of your medications? : No  Do you have all of your needed medical supplies or equipment (DME)?  (i.e. oxygen tank, CPAP, cane, etc.): Yes  Discharge follow-up appointment scheduled within 14 calendar days? : Yes  Discharge Follow Up Appointment Date: 11/08/22  Discharge Follow Up Appointment Scheduled with?: Specialty Care Provider (Psych appt)    Post-op ([facility] Only)  If the patient had a surgery or procedure, do they have any questions for a nurse?: No    Post-op (Clinicians Only)  Did the patient have surgery or a procedure: No  Fever: No  Chills: No      PLAN                                                      Outpatient Plan:  Treatment Follow-Up:Your intake with [facility] is scheduled for Monday November 7th @ 1:00 PM.  You will be sent a confirmation text when insurance is verified.  This is a virtual appointment and an e-mail will be sent with access to the appointment.  .  At the time of discharge patient denies any active suicidal ideation plan or intent.  She wants to live  she wants to get better.  She has her son to think of.       Future Appointments   Date Time Provider Department Center   11/3/2022 11:00 AM Rosi Donis MD [facility]   12/9/2022  1:00 PM Mary Jane Rivera PA-C [facility]   1/23/2023  8:30 AM Liborio Lincoln MD [facility]   4/19/2023  1:00 PM Celso Mcwilliams MD [facility]         For any urgent concerns, please contact our 24 hour nurse triage line: [phone] ([phone])         MARGARITA Charlton

## 2022-10-28 NOTE — TELEPHONE ENCOUNTER
SW received voicemail from patient. She reports life has pretty much been a mess for the past month. She notes Dr. Donis put in a referral to writer for help with insurance, co-pays, etc. And she could really use the help. She can be reached by phone or my chart.    FELICIA left voicemail following up and provided contact number.    YAHAIRA Stevenson, [facility]  [phone]

## 2022-10-29 NOTE — ED TRIAGE NOTES
Pt here for constipation X 14 days. Pt has been doing a double dose of miralax, Senna, suppositories, prune juice with no effect. Yesterday pt did 8 fleet enemas with no effect.      Triage Assessment     Row Name 10/29/22 0906       Triage Assessment (Adult)    Airway WDL WDL       Respiratory WDL    Respiratory WDL WDL       Skin Circulation/Temperature WDL    Skin Circulation/Temperature WDL WDL       Cardiac WDL    Cardiac WDL WDL       Peripheral/Neurovascular WDL    Peripheral Neurovascular WDL WDL       Cognitive/Neuro/Behavioral WDL    Cognitive/Neuro/Behavioral WDL WDL

## 2022-10-29 NOTE — TELEPHONE ENCOUNTER
"Traige Call:     Pt calling to report that she has constipation and has not had a BM in two weeks    Pt has tried:   Miralax and 4 senna tablets daily  8 enemas yesterday  Suppositories    She is bloated   Abdomen painful off and on    Disposition: See PCP within 24 hours. Writer gave patient the care advice and she reports that rather than going to the [facility] she is going to the ED as she has \"been down this path before and the [facility] didn't do anything for her\".     Kourtney Nunes RN  [facility] Nurse Advisor 8:15 AM 10/29/2022    Reason for Disposition    Abdomen is more swollen than usual    Last bowel movement (BM) > 4 days ago    Additional Information    Negative: Patient sounds very sick or weak to the triager    Negative: [1] Vomiting AND [2] abdomen looks much more swollen than usual    Negative: [1] Vomiting AND [2] contains bile (green color)    Negative: [1] Constant abdominal pain AND [2] present > 2 hours    Negative: [1] Rectal pain or fullness from fecal impaction (rectum full of stool) AND [2] NOT better after SITZ bath, suppository or enema    Negative: [1] Intermittent mild abdominal pain AND [2] fever    Protocols used: CONSTIPATION-A-AH      "

## 2022-10-29 NOTE — DISCHARGE INSTRUCTIONS
*You may resume diet and activities.  *Take medications as prescribed.  Take dulcolax then wait an hour. Mix the GoLytely with 64 oz of a beverage such as gatorade or crystal lite. Drink one glass every 15 mins until bowel movement occurs.  After you have had relief, you should continue miralax daily as prescribed by your gastroenterologist and recommend daily docusate sodium (colace).  *Follow-up with your gastroenterologist in the next 2-3 days.  *Return if you develop abdominal pain, vomiting, fever or become worse in any way.    Discharge Instructions  Constipation  Constipation can cause severe cramping pain and your provider thinks this might be the cause of your abdominal pain (belly pain) today.  People usually recognize that they are constipated because they have difficulty having bowel movements, are not having bowel movements frequently enough, or are not having large enough bowel movements. Sometimes, especially in children or older people, you do not recognize that you are constipated until it becomes severe. The most common causes of constipation are a lack of exercise and not eating enough fruits, vegetables, and whole grains. Constipation can also be a side effect of medications, such as narcotics, or may be caused by a disease of the digestive system.    Generally, every Emergency Department visit should have a follow-up clinic visit with either a primary or a specialty clinic/provider. Please follow-up as instructed by your emergency provider today. Sometimes, chronic constipation requires further testing to determine the cause. If you are over 50 years old, you may need a colonoscopy if you have not had one before.     Return to the Emergency Department if:  Your abdominal pain worsens or does not improve after a bowel movement.  You become very weak.  You get a temperature above 102oF or as directed by your provider.  You have blood in your stools (bright red or black, tarry stools).  You keep  vomiting (throwing up) or cannot drink liquids.  Your see blood when you vomit.  Your stomach gets bloated or bigger.  You have new symptoms or anything that worries you.    What can I do to help myself?  If your provider gave you a cathartic medication, like magnesium citrate or GoLytely  (polyethylene glycol), you can expect to have cramps and gas pains after taking it. You can expect to have a number of bowel movements and even diarrhea (loose or watery stools) in the course of clearing your bowels.  You will know your bowels have been cleaned out after you pass clear liquid. The cramps and gas should let up after you have emptied your bowels. You may want to wait until morning to take this type of medication so you aren t up in the night.   Sometimes instead of cathartics, we recommend laxatives like milk of magnesia to move your bowels more slowly, or an enema to help the bowels to move. Read and follow the package directions, or follow your provider s instructions.  Once you have become very constipated, it takes time for your bowels to return to normal and you need to be very careful to prevent becoming constipated again. Take a laxative if you do not move your bowels at least every two days.     Eat foods that have a lot of fiber. Good choices are fruits, vegetables, prune juice, apple juice, and high fiber cereal. Limit dairy products such as milk and cheese, since these can make constipation worse.   Drink plenty of water.   When you feel the need to go to the bathroom, go to the bathroom. Do not hold it.  Miralax , Metamucil , Colace , Senna or fiber supplements can be used daily.  Miralax  daily is often the best choice for children.  If you were given a prescription for medicine here today, be sure to read all of the information (including the package insert) that comes with your prescription.  This will include important information about the medicine, its side effects, and any warnings that you need  to know about.  The pharmacist who fills the prescription can provide more information and answer questions you may have about the medicine.  If you have questions or concerns that the pharmacist cannot address, please call or return to the Emergency Department.   Remember that you can always come back to the Emergency Department if you are not able to see your regular provider in the amount of time listed above, if you get any new symptoms, or if there is anything that worries you.

## 2022-10-29 NOTE — ED PROVIDER NOTES
History   Chief Complaint:  Constipation                HPI:   Ana Laura Wharton is a 52 year old female with history of constipation and hypertension who presents with constipation. She reports she has not had a bowel movement for two weeks. For the duration of this time, she has been drinking a double dose of Miralax every day and taking 4 tablets of Senna. Yesterday, she drank a liter of prune juice and gave herself 8 enemas; this produced no result. She reports her abdomen is tender as well. She denies fever or vomiting. She denies history of bowel obstruction. She is not using any opiates. She recently had a hospitalization from 10/24-10/26 Bipolar affective disorder most recent episode mixed severe recurrent without psychosis and a UTI; she was started on trazodone, thiamine, and sulfa following this.       Review of Systems   Constitutional: Negative for fever.   Gastrointestinal: Positive for abdominal pain and constipation. Negative for vomiting.   All other systems reviewed and are negative.      Allergies:  Ciprofloxacin  Compazine   Clindamycin  Metoclopramide  Reglan   Restoril   Thorazine   Abilify   Lamotrigine  Sulfa Antibiotics  Temazepam  Phenothiazines  Penicillins  Minocycline  Barbiturates  Fluocinolone  Cephalexin   Amoxicillin-Pot Clavulanate  Azithromycin  Aripiprazole    Medications:  Atorvastatin  Carvedilol   Cyclobenzaprine   Hydroxyzine   Levothyroxine  Mirtazapine   Macrobid   Norethindrone   Olanzapine   Pantoprazole   Bactrim   Thiamine   Trazodone   Folic acid   Senna-docusate  Sumatriptan     Past Medical History:     Constipation   Anxiety disorder  Suicidal ideation   Bipolar I disorder  Overdose  Depression   Esophageal reflux   Impulse control disorder   Myopia   Orofacial dyskinesia   Hypercholesterolemia   Poisoning by 4-aminophenol derivatives  Hypertension   Urinary retention   Alcoholic cirrhosis of liver   Pancreatitis   Pulmonary emphysema   PRES   Spontaneous  pneumothorax   Acute renal failure   Hypotension due to medication   C. diff   Hypothyroidism  Migraines   STD  Acute cystitis     Past Surgical History:    Bladder surgery   Cervix surgery   Orthopedic surgery  Thoracic surgery      Family History:    Mother: depression, pacemaker, osteoporosis   Father: hyperlipidemia, macular degeneration    Social History:  The patient presents to the ED with family member.  PCP: Liborio Lincoln     Physical Exam     Patient Vitals for the past 24 hrs:   BP Temp Temp src Pulse Resp SpO2   10/29/22 0905 (!) 140/96 97  F (36.1  C) Temporal 83 16 97 %       Physical Exam  General: Well-nourished, appears to be resting comfortably when I enter the room  Eyes: PERRL, conjunctivae pink no scleral icterus or conjunctival injection  ENT:  Moist mucus membranes, posterior oropharynx clear without erythema or exudates  Respiratory:  Lungs clear to auscultation bilaterally, no crackles/rubs/wheezes.  Good air movement  CV: Normal rate and rhythm, no murmurs/rubs/gallops  GI:  Abdomen soft and non-distended.  Normoactive BS.  Very mild low left abdominal tenderness, no guarding or rebound. Nonsurgical abdomen.  Skin: Warm, dry.  No rashes or petechiae  Musculoskeletal: No peripheral edema or calf tenderness  Neuro: Alert and oriented to person/place/time  Psychiatric: Normal affect      Emergency Department Course     Imaging:  Abdomen XR, 2 vw, flat and upright   Preliminary Result   IMPRESSION: Moderate amount of gas and stool throughout the colon. No convincing radiographic evidence for bowel obstruction. No free intraperitoneal air.           Report per radiology    Laboratory:  Labs Ordered and Resulted from Time of ED Arrival to Time of ED Departure - No data to display   Emergency Department Course:  Mental Health Risk Assessment      PSS-3    Date and Time Over the past 2 weeks have you felt down, depressed, or hopeless? Over the past 2 weeks have you had thoughts of killing  yourself? Have you ever attempted to kill yourself? When did this last happen? User   10/29/22 0908 yes no yes within the last month (but not today) SC      C-SSRS ([place])    Date and Time Q1 Wished to be Dead (Past Month) Q2 Suicidal Thoughts (Past Month) Q3 Suicidal Thought Method Q4 Suicidal Intent without Specific Plan Q5 Suicide Intent with Specific Plan Q6 Suicide Behavior (Lifetime) Within the Past 3 Months? RETIRED: Level of Risk per Screen Screening Not Complete User   10/29/22 0908 yes yes -- -- -- -- -- -- -- SC              Suicide assessment completed by mental health (D.E.C., LCSW, etc.)    Reviewed:  I reviewed nursing notes, vitals, past medical history and Care Everywhere    Assessments:  0924 I obtained history and examined the patient as noted above.   1230 I explained findings and discussed discharge.     Interventions:  Medications   Enema Compound (docusate/mineral oil/NaPhos) NO MAG CIT PREMIX (226 mLs Rectal Given 10/29/22 0941)   Enema Compound (docusate/mineral oil/NaPhos) NO MAG CIT PREMIX (226 mLs Rectal Given 10/29/22 1156)     Disposition:  The patient was discharged to home.     Impression & Plan     Medical Decision Making:  Ana Laura Wharton is a 52 year old female who presents for evaluation for decreased stool output without signs of serious intraabdominal problems. Signs and symptoms are consistent with constipation.  Abdominal x-ray shows no signs of obstruction.  Her abdomen is quite benign and I do not believe she needs a CT scan or laboratory studies at this time. There are no signs of obstruction nor other intraabdominal catastrophe. There are no indications for advanced imaging or admission.  We discussed strategies for management and she requested that we try an enema here.  We did try this x2 without success.  I am going to send them home with instructions on medication management of this. They will then start daily stool softener as well once they are more completely  cleaned out. Patient is agreeable with this and questions are answered.  At this time, with reasonable clinical confidence, I do believe she safe for discharge home at this time.    Diagnosis:    ICD-10-CM    1. Constipation, unspecified constipation type  K59.00           Discharge Medications:  Discharge Medication List as of 10/29/2022 12:43 PM      START taking these medications    Details   bisacodyl (DULCOLAX) 5 MG EC tablet Take 3 tablets (15 mg) by mouth See Admin Instructions, Disp-3 tablet, R-0, E-Prescribe      polyethylene glycol (GOLYTELY) 236 g suspension Take 4,000 mLs by mouth See Admin Instructions, Disp-4000 mL, R-0, E-Prescribe             Scribe Disclosure:  I, Joshua Kjer, am serving as a scribe at 9:24 AM on 10/29/2022 to document services personally performed by Kelli Woods MD based on my observations and the provider's statements to me.           eKlli Woods MD  10/29/22 0562

## 2022-10-29 NOTE — ED NOTES
Patient discharged home with spouse. Vital signs stable at discharge. Education provided regarding avs reviewed. Pt verbalized understanding. All questions answered.

## 2022-11-03 NOTE — PROGRESS NOTES
Ana Laura Wharton is a 52 year old who is being evaluated via a billable video visit.      Pt will join video visit via: [employer]  If there are problems joining the visit, send backup video invite via: Send to preferred e-mail: [email or web address]    Reason for telehealth visit: Patient has requested telehealth visit    Originating location (patient location): Patient's place of employment    Will anyone else be joining the visit? No
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
## 2022-11-03 NOTE — PATIENT INSTRUCTIONS
- its okay to take 2 doses of trazodone at night   - no med changes   - follow-up Nov 22nd at 10 am via video     **For crisis resources, please see the information at the end of this document**   Patient Education    Thank you for coming to the [facility] [place] [facility].     Lab Testing:  If you had lab testing today and your results are reassuring or normal they will be mailed to you or sent through Checkd.In within 7 days. If the lab tests need quick action we will call you with the results. The phone number we will call with results is # [phone]. If this is not the best number please call our clinic and change the number.     Medication Refills:  If you need any refills please call your pharmacy and they will contact us. Our fax number for refills is [phone].   Three business days of notice are needed for general medication refill requests.   Five business days of notice are needed for controlled substance refill requests.   If you need to change to a different pharmacy, please contact the new pharmacy directly. The new pharmacy will help you get your medications transferred.     Contact Us:  Please call [phone] during business hours (8-5:00 M-F).   If you have medication related questions after clinic hours, or on the weekend, please call [phone].     Financial Assistance [phone]   Medical Records [phone]       MENTAL HEALTH CRISIS RESOURCES:  For a emergency help, please call 911 or go to the nearest Emergency Department.     Emergency Walk-In Options:   EmPATH Unit @ [facility] ([place]): [phone] - Specialized mental health emergency area designed to be calming  [facility] ([place]): [phone]  [facility] ([place]): [phone]  [facility]): [phone]    [facility] Crisis Information:   [place]: [phone]  [place]: [phone]  [place] ([place]) - Adult:  [phone]     Child: [phone]  [place] - Adult: [phone]     Child: [phone]  [place]: [phone]  List of all [facility] resources:   https://[place].gov/dhs/people-we-serve/adults/health-care/mental-health/resources/crisis-contacts.jsp    National Crisis Information:   Crisis Text Line: Text  [place]  to 718196  Suicide & Crisis Lifeline: 988  National Suicide Prevention Lifeline: 4-488-600-TALK (1-889.716.6263)       For online chat options, visit https://suicidepreventionlifeline.org/chat/  Poison Control Center: 1-249.589.7767  Trans Lifeline: 1-679.234.7339 - Hotline for transgender people of all ages  The [employer]: 8-763-047-3983 - Hotline for LGBT youth     For Non-Emergency Support:   Fast Tracker: Mental Health & Substance Use Disorder Resources -   https://www.ScanckProductivn.org/

## 2022-11-09 NOTE — ED PROVIDER NOTES
History   Chief Complaint:  Overdose     The history is provided by the EMS personnel. History limited by: Patient unresponsive.      Ana Laura Wharton is a 52 year old female with history of bipolar 1 disorder who presents via EMS from her home unresponsive following suspected overdose of an unknown quantity of unknown medication - unknown down time. Earlier this morning at 0830, the patient sent a text to her friend expressing suicidality with plan. On receiving the text, PD was called and on arrival to her house, found the patient unresponsive in her home with agonal breathing. They initiated CPR and called ALS, who arrived and found a suicide note with plan for overdose and with evidence of empty pill bottles (unknown medication - suspected olanzapine, trazodone, hydroxyzine, and possible anticoagulant). CPR was performed for approximately 15 minutes on scene before transport to ED. En route, the patient remained asystolic with agonal breathing despite 4 mg epi, 1 amp bicarb, and 0.5 mg narcan. Blood glucose measured 130. IV was unsuccessful due to significant bleeding from the site.    Review of Systems   Unable to perform ROS: Patient unresponsive     Allergies:  No known drug allergies    Medications:  The patient takes no daily medication.    Past Medical History:     UTI  Bipolar 1 disorder    Social History:  The patient presents to the ED alone.  The patient arrived via EMS.  PCP: No primary care provider on file.     Physical Exam     Patient Vitals for the past 24 hrs:   BP Pulse SpO2   11/09/22 1123 -- (!) 0 --   11/09/22 1121 -- (!) 0 --   11/09/22 1120 -- (!) 123 --   11/09/22 1118 -- (!) 231 99 %   11/09/22 1117 -- 110 100 %   11/09/22 1105 (!) (P) 186/22 99 93 %     Physical Exam  Vitals and nursing note reviewed.   Constitutional:       Appearance: She is well-developed. She is ill-appearing and toxic-appearing.   HENT:      Head: Normocephalic and atraumatic.      Right Ear: External ear normal.       Left Ear: External ear normal.      Nose: Nose normal.      Mouth/Throat:      Comments: I gel in place in oropharynx  Eyes:      Conjunctiva/sclera: Conjunctivae normal.      Comments: Pupils equal but fixed and unresponsive   Cardiovascular:      Comments: CPR in progress, peripheral pulses palpable with CPR  Pulmonary:      Breath sounds: Normal breath sounds. No wheezing, rhonchi or rales.      Comments: Patient bagged ventilated via supraglottic airway, no spontaneous respirations  Abdominal:      General: Abdomen is flat. There is no distension.      Palpations: Abdomen is soft.   Musculoskeletal:         General: No swelling, deformity or signs of injury.      Cervical back: Normal range of motion and neck supple.   Skin:     General: Skin is cool and dry.      Capillary Refill: Capillary refill takes more than 3 seconds.      Coloration: Skin is pale.      Findings: No rash.   Neurological:      Mental Status: She is unresponsive.      GCS: GCS eye subscore is 1. GCS verbal subscore is 1. GCS motor subscore is 1.           Emergency Department Course     Emergency Department Course:     Reviewed:  I reviewed nursing notes, vitals and past medical history.    Assessments:  1057 I obtained history and examined the patient as noted above. CPR in progress by TANK device.  1101 Pulse check performed. Resume CPR.  1102 1 mg epi administered IV  1103 50 mEq bicarb given IV  1105 Dr. Amado arrived at patient bedside and performed intubation (see his separate procedure note).  1106  1 mg epi administered IV  1107  2 mg narcan given IV  1109 Pulse check performed. Resume CPR.  1112 50 mEq bicarb given IV  1112 Pulse check performed. Resume CPR.  1112 I performed bedside US. No cardiac activity present.  1113 1 mg epi administered IV  1116 Pulse check performed. Resume CPR.  1118 1 mg epi administered IV  1120 Pulse check performed. No cardiac activity.  1121 The patient  in ED.    Disposition:  The patient  .    Impression & Plan     Medical Decision Makin-year-old female presenting via EMS from home after apparently intentionally overdosing on medications.  She was pulseless and apneic for EMS on their arrival.  There was no bystander CPR prior to their arrival.  Unknown downtime.  She has been asystolic for the entire duration of the resuscitation.  And I gel was placed and medications were given via an IO.  On arrival to the ED patient was transferred to the ED stretcher and compressions were continued with the Luis device.  Patient was intubated by Dr. Amado as noted in his separate note.  Various pulse checks were performed and patient remained asystolic or in very slow PEA the entire time.  She did not have any return of pulses.  She was given multiple doses of epinephrine as well as bicarb and Narcan without response.  Her blood sugar was not significantly low.  Bedside ultrasound was performed and showed cardiac standstill without any apparent cardiac activity.  Given the unknown downtime with no bystander CPR in setting of persistent asystole and cardiac standstill on the bedside echo, along with greater than 30 minutes of attempted resuscitation, this patient's prognosis is extremely grim.  Therefore decision was made to cease resuscitative efforts and time of death was called at 11:21 AM.    Critical Care Time: was 30 minutes for this patient excluding procedures    Diagnosis:    ICD-10-CM    1. Cardiac arrest (H)  I46.9       2. Overdose, intentional self-harm, initial encounter (H)  T50.902A         Scribe Disclosure:  Analy CRUZ, am serving as a scribe at 11:00 AM on 2022 to document services personally performed by Terry Carrillo MD based on my observations and the provider's statements to me.     Terry Carrillo MD  22 [id number]

## 2022-11-09 NOTE — ED NOTES
1101 - Pulse check, asystole, restart CPR    1106 - Pulse check, asystole, restart CPR    1109 - Pulse check, asystole, ultrasound shows no cardiac activity, restart CPR    1113 - Pulse check, asystole, restart CPR    1116 - Pulse check, asystole, ultrasound shows no cardiac activity, restart CPR    1120 - Pulse check, asystole, ultrasound shows no cardiac activity, restart CPR    1121 - Time of Death called by Dr. Carrillo.

## 2022-11-09 NOTE — PROGRESS NOTES
Intubation Note    Date of intubation: 11/09/22    Time of intubation: 1106  Location of intubation: ER 2    Size of ETT: 7.5  Location (cm) at lip: 24    ETT placement confirmed by: End CO2  Bilateral breath sounds per MD

## 2022-11-09 NOTE — ED PROVIDER NOTES
Rapid Sequence Intubation      Procedure: Rapid Sequence Intubation    Consent: Unable/Emergent     Risks Discussed: Unable/Emergent    Universal Protocol: Universal protocol was followed and time out conducted just prior to starting procedure, confirming patient identity, site/side, procedure, patient position, and availability of correct equipment and implants.     Indication: Respiratory failure/Cardiac Arrest    Preparation: Preoxygenation with BVM. Premedication with None.    Sedation: None    Paralytic: None    Procedure Detail:   The patient was intubated with a 7.5 endotracheal tube using Video Laryngoscopy.  Following intubation, the patient's breath sounds were Equal.  ET Tube placement was confirmed with Auscultation and ETCO2.    Monitoring: Monitoring consisted of heart rate, cardiac monitor, continuous pulse oximetry, blood pressure checks, level of consciousness, IV access, constant attendance by RN, and MD attendance until patient stable or transfer of care.      Patient Status: The patient tolerated the procedure well: Yes. There were no complications.       Adryan Amado DO  11/09/22 [id number]

## 2022-11-09 NOTE — ED TRIAGE NOTES
Pt presents via EMS in cardiac arrest. Suicide note sent to friend at 0830 this morning stating that pt was going to overdose. Unknown LKW. Pt asystolic on scene per EMS. Unknown what pt took, pills found included trazodone, olanzapine, and hydroxizine, possibly took blood thinning medication as well per EMS. I-gel in place from EMS. X4 rounds of 1mg epi, amp of bicarb, and 0.5 mg narcan administered by EMS.

## 2022-11-16 DIAGNOSIS — F31.9 BIPOLAR I DISORDER (H): ICD-10-CM

## 2022-11-16 RX ORDER — MIRTAZAPINE 30 MG/1
TABLET, FILM COATED ORAL
Qty: 180 TABLET | Refills: 3 | OUTPATIENT
Start: 2022-11-16

## 2023-01-08 DIAGNOSIS — E78.5 HYPERLIPIDEMIA LDL GOAL <130: ICD-10-CM

## 2023-01-08 RX ORDER — ATORVASTATIN CALCIUM 10 MG/1
TABLET, FILM COATED ORAL
Qty: 90 TABLET | Refills: 3 | OUTPATIENT
Start: 2023-01-08

## 2023-01-14 DIAGNOSIS — R33.9 URINE RETENTION: ICD-10-CM

## 2023-01-14 RX ORDER — TRAZODONE HYDROCHLORIDE 50 MG/1
TABLET, FILM COATED ORAL
Qty: 100 TABLET | Refills: 2 | OUTPATIENT
Start: 2023-01-14
